# Patient Record
Sex: FEMALE | Race: WHITE | Employment: UNEMPLOYED | URBAN - METROPOLITAN AREA
[De-identification: names, ages, dates, MRNs, and addresses within clinical notes are randomized per-mention and may not be internally consistent; named-entity substitution may affect disease eponyms.]

---

## 2018-12-15 ENCOUNTER — APPOINTMENT (OUTPATIENT)
Dept: GENERAL RADIOLOGY | Age: 48
End: 2018-12-15
Payer: COMMERCIAL

## 2018-12-15 ENCOUNTER — HOSPITAL ENCOUNTER (OUTPATIENT)
Age: 48
Setting detail: OBSERVATION
Discharge: HOME | End: 2018-12-16
Attending: EMERGENCY MEDICINE | Admitting: EMERGENCY MEDICINE
Payer: COMMERCIAL

## 2018-12-15 ENCOUNTER — APPOINTMENT (OUTPATIENT)
Dept: CT IMAGING | Age: 48
End: 2018-12-15
Payer: COMMERCIAL

## 2018-12-15 DIAGNOSIS — R07.9 ACUTE CHEST PAIN: Primary | ICD-10-CM

## 2018-12-15 LAB
ABSOLUTE EOS #: <0.03 K/UL (ref 0–0.44)
ABSOLUTE IMMATURE GRANULOCYTE: 0.06 K/UL (ref 0–0.3)
ABSOLUTE LYMPH #: 2.46 K/UL (ref 1.1–3.7)
ABSOLUTE MONO #: 0.92 K/UL (ref 0.1–1.2)
ALBUMIN SERPL-MCNC: 4.4 G/DL (ref 3.5–5.2)
ALBUMIN/GLOBULIN RATIO: 1.3 (ref 1–2.5)
ALP BLD-CCNC: 136 U/L (ref 35–104)
ALT SERPL-CCNC: 81 U/L (ref 5–33)
ANION GAP SERPL CALCULATED.3IONS-SCNC: 21 MMOL/L (ref 9–17)
AST SERPL-CCNC: 165 U/L
BASOPHILS # BLD: 0 % (ref 0–2)
BASOPHILS ABSOLUTE: <0.03 K/UL (ref 0–0.2)
BILIRUB SERPL-MCNC: 1.14 MG/DL (ref 0.3–1.2)
BILIRUBIN DIRECT: 0.42 MG/DL
BILIRUBIN, INDIRECT: 0.72 MG/DL (ref 0–1)
BUN BLDV-MCNC: 10 MG/DL (ref 6–20)
BUN/CREAT BLD: ABNORMAL (ref 9–20)
CALCIUM IONIZED: 1.01 MMOL/L (ref 1.13–1.33)
CALCIUM SERPL-MCNC: 9.4 MG/DL (ref 8.6–10.4)
CHLORIDE BLD-SCNC: 95 MMOL/L (ref 98–107)
CO2: 23 MMOL/L (ref 20–31)
CREAT SERPL-MCNC: 0.51 MG/DL (ref 0.5–0.9)
D-DIMER QUANTITATIVE: 2.17 MG/L FEU
DIFFERENTIAL TYPE: ABNORMAL
EOSINOPHILS RELATIVE PERCENT: 0 % (ref 1–4)
GFR AFRICAN AMERICAN: >60 ML/MIN
GFR NON-AFRICAN AMERICAN: >60 ML/MIN
GFR SERPL CREATININE-BSD FRML MDRD: ABNORMAL ML/MIN/{1.73_M2}
GFR SERPL CREATININE-BSD FRML MDRD: ABNORMAL ML/MIN/{1.73_M2}
GLOBULIN: ABNORMAL G/DL (ref 1.5–3.8)
GLUCOSE BLD-MCNC: 128 MG/DL (ref 70–99)
HCT VFR BLD CALC: 40.4 % (ref 36.3–47.1)
HEMOGLOBIN: 13.9 G/DL (ref 11.9–15.1)
IMMATURE GRANULOCYTES: 1 %
LYMPHOCYTES # BLD: 21 % (ref 24–43)
MAGNESIUM: 1.4 MG/DL (ref 1.6–2.6)
MCH RBC QN AUTO: 36.3 PG (ref 25.2–33.5)
MCHC RBC AUTO-ENTMCNC: 34.4 G/DL (ref 28.4–34.8)
MCV RBC AUTO: 105.5 FL (ref 82.6–102.9)
MONOCYTES # BLD: 8 % (ref 3–12)
NRBC AUTOMATED: 0 PER 100 WBC
PDW BLD-RTO: 13 % (ref 11.8–14.4)
PLATELET # BLD: 177 K/UL (ref 138–453)
PLATELET ESTIMATE: ABNORMAL
PMV BLD AUTO: 11.5 FL (ref 8.1–13.5)
POC TROPONIN I: 0 NG/ML (ref 0–0.1)
POC TROPONIN I: 0 NG/ML (ref 0–0.1)
POC TROPONIN INTERP: NORMAL
POC TROPONIN INTERP: NORMAL
POTASSIUM SERPL-SCNC: 4.3 MMOL/L (ref 3.7–5.3)
RBC # BLD: 3.83 M/UL (ref 3.95–5.11)
RBC # BLD: ABNORMAL 10*6/UL
SEG NEUTROPHILS: 70 % (ref 36–65)
SEGMENTED NEUTROPHILS ABSOLUTE COUNT: 8.08 K/UL (ref 1.5–8.1)
SODIUM BLD-SCNC: 139 MMOL/L (ref 135–144)
THYROXINE, FREE: 1.23 NG/DL (ref 0.93–1.7)
TOTAL PROTEIN: 7.7 G/DL (ref 6.4–8.3)
TSH SERPL DL<=0.05 MIU/L-ACNC: 1.64 MIU/L (ref 0.3–5)
WBC # BLD: 11.6 K/UL (ref 3.5–11.3)
WBC # BLD: ABNORMAL 10*3/UL

## 2018-12-15 PROCEDURE — 71045 X-RAY EXAM CHEST 1 VIEW: CPT

## 2018-12-15 PROCEDURE — 84484 ASSAY OF TROPONIN QUANT: CPT

## 2018-12-15 PROCEDURE — 82330 ASSAY OF CALCIUM: CPT

## 2018-12-15 PROCEDURE — 2580000003 HC RX 258: Performed by: EMERGENCY MEDICINE

## 2018-12-15 PROCEDURE — 6370000000 HC RX 637 (ALT 250 FOR IP): Performed by: EMERGENCY MEDICINE

## 2018-12-15 PROCEDURE — 96375 TX/PRO/DX INJ NEW DRUG ADDON: CPT

## 2018-12-15 PROCEDURE — G0378 HOSPITAL OBSERVATION PER HR: HCPCS

## 2018-12-15 PROCEDURE — 71260 CT THORAX DX C+: CPT

## 2018-12-15 PROCEDURE — 80076 HEPATIC FUNCTION PANEL: CPT

## 2018-12-15 PROCEDURE — 85025 COMPLETE CBC W/AUTO DIFF WBC: CPT

## 2018-12-15 PROCEDURE — 6360000004 HC RX CONTRAST MEDICATION: Performed by: EMERGENCY MEDICINE

## 2018-12-15 PROCEDURE — 99285 EMERGENCY DEPT VISIT HI MDM: CPT

## 2018-12-15 PROCEDURE — 83735 ASSAY OF MAGNESIUM: CPT

## 2018-12-15 PROCEDURE — 6360000002 HC RX W HCPCS: Performed by: EMERGENCY MEDICINE

## 2018-12-15 PROCEDURE — 93005 ELECTROCARDIOGRAM TRACING: CPT

## 2018-12-15 PROCEDURE — 96365 THER/PROPH/DIAG IV INF INIT: CPT

## 2018-12-15 PROCEDURE — 85379 FIBRIN DEGRADATION QUANT: CPT

## 2018-12-15 PROCEDURE — 84443 ASSAY THYROID STIM HORMONE: CPT

## 2018-12-15 PROCEDURE — 84439 ASSAY OF FREE THYROXINE: CPT

## 2018-12-15 PROCEDURE — 80048 BASIC METABOLIC PNL TOTAL CA: CPT

## 2018-12-15 RX ORDER — SODIUM CHLORIDE 0.9 % (FLUSH) 0.9 %
10 SYRINGE (ML) INJECTION EVERY 12 HOURS SCHEDULED
Status: DISCONTINUED | OUTPATIENT
Start: 2018-12-15 | End: 2018-12-16 | Stop reason: HOSPADM

## 2018-12-15 RX ORDER — MAGNESIUM SULFATE 1 G/100ML
1 INJECTION INTRAVENOUS ONCE
Status: COMPLETED | OUTPATIENT
Start: 2018-12-15 | End: 2018-12-15

## 2018-12-15 RX ORDER — ASPIRIN 81 MG/1
324 TABLET, CHEWABLE ORAL ONCE
Status: COMPLETED | OUTPATIENT
Start: 2018-12-15 | End: 2018-12-15

## 2018-12-15 RX ORDER — LORAZEPAM 2 MG/ML
1 INJECTION INTRAMUSCULAR ONCE
Status: COMPLETED | OUTPATIENT
Start: 2018-12-15 | End: 2018-12-15

## 2018-12-15 RX ORDER — OXYCODONE HYDROCHLORIDE 5 MG/1
10 TABLET ORAL EVERY 4 HOURS PRN
Status: DISCONTINUED | OUTPATIENT
Start: 2018-12-15 | End: 2018-12-16 | Stop reason: HOSPADM

## 2018-12-15 RX ORDER — FENTANYL CITRATE 50 UG/ML
50 INJECTION, SOLUTION INTRAMUSCULAR; INTRAVENOUS
Status: DISCONTINUED | OUTPATIENT
Start: 2018-12-15 | End: 2018-12-16 | Stop reason: HOSPADM

## 2018-12-15 RX ORDER — FENTANYL CITRATE 50 UG/ML
25 INJECTION, SOLUTION INTRAMUSCULAR; INTRAVENOUS
Status: DISCONTINUED | OUTPATIENT
Start: 2018-12-15 | End: 2018-12-16 | Stop reason: HOSPADM

## 2018-12-15 RX ORDER — ACETAMINOPHEN 325 MG/1
650 TABLET ORAL EVERY 4 HOURS PRN
Status: DISCONTINUED | OUTPATIENT
Start: 2018-12-15 | End: 2018-12-16 | Stop reason: HOSPADM

## 2018-12-15 RX ORDER — SODIUM CHLORIDE 0.9 % (FLUSH) 0.9 %
10 SYRINGE (ML) INJECTION PRN
Status: DISCONTINUED | OUTPATIENT
Start: 2018-12-15 | End: 2018-12-16 | Stop reason: HOSPADM

## 2018-12-15 RX ORDER — OXYCODONE HYDROCHLORIDE 5 MG/1
5 TABLET ORAL EVERY 4 HOURS PRN
Status: DISCONTINUED | OUTPATIENT
Start: 2018-12-15 | End: 2018-12-16 | Stop reason: HOSPADM

## 2018-12-15 RX ORDER — OXYCODONE HYDROCHLORIDE AND ACETAMINOPHEN 5; 325 MG/1; MG/1
1 TABLET ORAL ONCE
Status: COMPLETED | OUTPATIENT
Start: 2018-12-15 | End: 2018-12-15

## 2018-12-15 RX ORDER — ONDANSETRON 4 MG/1
4 TABLET, ORALLY DISINTEGRATING ORAL EVERY 8 HOURS PRN
Status: DISCONTINUED | OUTPATIENT
Start: 2018-12-15 | End: 2018-12-16 | Stop reason: HOSPADM

## 2018-12-15 RX ADMIN — ASPIRIN 81 MG 324 MG: 81 TABLET ORAL at 16:08

## 2018-12-15 RX ADMIN — Medication 10 ML: at 20:48

## 2018-12-15 RX ADMIN — LORAZEPAM 1 MG: 2 INJECTION, SOLUTION INTRAMUSCULAR; INTRAVENOUS at 16:09

## 2018-12-15 RX ADMIN — MAGNESIUM SULFATE HEPTAHYDRATE 1 G: 1 INJECTION, SOLUTION INTRAVENOUS at 17:20

## 2018-12-15 RX ADMIN — IOPAMIDOL 75 ML: 755 INJECTION, SOLUTION INTRAVENOUS at 16:58

## 2018-12-15 RX ADMIN — OXYCODONE HYDROCHLORIDE AND ACETAMINOPHEN 1 TABLET: 5; 325 TABLET ORAL at 17:43

## 2018-12-15 RX ADMIN — OXYCODONE HYDROCHLORIDE 5 MG: 5 TABLET ORAL at 20:47

## 2018-12-15 ASSESSMENT — PAIN DESCRIPTION - DESCRIPTORS
DESCRIPTORS: SHARP
DESCRIPTORS: SHARP

## 2018-12-15 ASSESSMENT — PAIN DESCRIPTION - FREQUENCY
FREQUENCY: CONTINUOUS
FREQUENCY: CONTINUOUS

## 2018-12-15 ASSESSMENT — PAIN DESCRIPTION - ORIENTATION
ORIENTATION: LEFT
ORIENTATION: LEFT

## 2018-12-15 ASSESSMENT — PAIN DESCRIPTION - LOCATION
LOCATION: CHEST
LOCATION: CHEST

## 2018-12-15 ASSESSMENT — PAIN DESCRIPTION - PAIN TYPE
TYPE: ACUTE PAIN
TYPE: ACUTE PAIN

## 2018-12-15 ASSESSMENT — PAIN DESCRIPTION - PROGRESSION
CLINICAL_PROGRESSION: NOT CHANGED
CLINICAL_PROGRESSION: NOT CHANGED
CLINICAL_PROGRESSION: GRADUALLY WORSENING
CLINICAL_PROGRESSION: NOT CHANGED
CLINICAL_PROGRESSION: NOT CHANGED

## 2018-12-15 ASSESSMENT — HEART SCORE: ECG: 1

## 2018-12-15 ASSESSMENT — PAIN SCALES - GENERAL
PAINLEVEL_OUTOF10: 5
PAINLEVEL_OUTOF10: 4

## 2018-12-15 ASSESSMENT — ENCOUNTER SYMPTOMS
DIARRHEA: 0
COUGH: 1
CONSTIPATION: 0
COLOR CHANGE: 0
PHOTOPHOBIA: 0
TROUBLE SWALLOWING: 0
SHORTNESS OF BREATH: 1
ABDOMINAL PAIN: 0
NAUSEA: 0
CHEST TIGHTNESS: 1
VOMITING: 0

## 2018-12-15 NOTE — ED PROVIDER NOTES
[facility]  ED  Emergency Department Encounter  EmergencyMedicine Resident     Pt Letty Hadley  MRN: [id number]  [place] 1970  Date of evaluation: 12/15/18  PCP:  LOI Stephen CNP    CHIEF COMPLAINT       Chief Complaint   Patient presents with    Chest Pain       HISTORY OF PRESENT ILLNESS  (Location/Symptom, Timing/Onset, Context/Setting, Quality, Duration, Modifying Factors, Severity.)      Karma Boas is a 51 y.o. female who presents with acute progressive left-sided chest pain starting at 9:00 the morning prior to arrival and worsening throughout the day. Patient states symptoms are new, denies previous similar symptoms in the past.  Notes pain radiates into her left shoulder. Denies nausea, vomiting. Previous cardiac workup in 2012 with negative stress test at that time per patient. Denies history of MI, cardiac events. States she is followed by a PCP and notes history of hypertension, reports she is on no medication at this time. Denies recent surgery, periods of inactivity, unilateral leg swelling. Denies history of DVT. Not currently on oral contraceptive. States she is a current every day smoker. Drinks approximately 2 alcoholic beverages daily. Denies illicit substance use. PAST MEDICAL / SURGICAL / SOCIAL / FAMILY HISTORY      has a past medical history of Hypertension and Pain, joint, ankle and foot. has a past surgical history that includes Hysterectomy (2003); Brain tumor excision (1989); fracture surgery (Left, 7-3-13); and fracture surgery (Right). Social History     Social History    Marital status: Single     Spouse name: N/A    Number of children: N/A    Years of education: N/A     Occupational History    Not on file.      Social History Main Topics    Smoking status: Current Every Day Smoker     Packs/day: 1.00     Years: 30.00     Types: Cigarettes    Smokeless tobacco: Never Used    Alcohol use 0.0 oz/week Vital signs remarkable for borderline hypertension, tachycardia. Patient afebrile. Physical exam shows generalized tremor with mild tongue fasciculations. No focal neurological deficits. Heart is regular rate and rhythm. Extremities or in his story wheezing on lung exams with diminished breath sounds throughout. Mild generalized abdominal tenderness, worse in the right upper quadrant without rebound or guarding. We'll obtain EKG, chest x-ray, cardiac labs including d-dimer. LFTs obtained. 1 mg Ativan given along with full dose aspirin. We'll continue to monitor and treat. ED Course as of Dec 15 1929   Sat Dec 15, 2018   1630 Available lab results remarkable for d-dimer elevated at 2.17. We'll proceed with CT chest to rule out PE, aortic dissection. Mild leukocytosis. Initial troponin negative. Will continue to monitor and treat. [RB]   9796 CT chest negative for pulmonary embolism. Right lower lobe ground-glass pulmonary nodule, Radiology recommending follow up CT for further evaluation in future. Magnesium sulfate started due to low magnesium, borderline QTc prolongation at 442. [RB]   3169 Patient agreeable for admission. Heart score calculated at 4. Spoke with [facility] consultants regarding patient history, presentation to the ED, current workup and findings[facility]. Orders placed. Awaiting bed placement at this time. We'll continue to monitor and treat. [RB]      ED Course User Index  [RB] Kay Norwood MD     Patient transported to observation unit in no acute distress. She remained stable throughout entire ED visit while under my care. PROCEDURES:  None    CONSULTS:  IP CONSULT TO HOSPITALIST  IP CONSULT TO CARDIOLOGY    CRITICAL CARE:  None    FINAL IMPRESSION      1.  Acute chest pain          DISPOSITION / PLAN     DISPOSITION Admitted 12/15/2018 06:21:27 PM      PATIENT REFERRED TO:  LOI Kaufman -

## 2018-12-15 NOTE — ED TRIAGE NOTES
Arrives w/ CO LT sided CP that has been gradually worsening since 0900 this morning. Pain was dull until around 1445 when the pain became very sharp. Pt states pain slightly radiates to her back. Hands are tingling, mostly LT side. Denies n/v but has had recent diarrhea. Denies SOB. Pt is trembling in bed but states this is d/t pain and nerves. Vitals stable.   Pt states she had a stress test in 2012 which was normal.

## 2018-12-15 NOTE — ED PROVIDER NOTES
circumstances.)    Odessa Smith MD [facility]  Attending Emergency Medicine Physician            Raymundo Avalos MD  12/15/18 0027

## 2018-12-16 VITALS
OXYGEN SATURATION: 98 % | HEART RATE: 76 BPM | BODY MASS INDEX: 22.99 KG/M2 | DIASTOLIC BLOOD PRESSURE: 97 MMHG | RESPIRATION RATE: 18 BRPM | SYSTOLIC BLOOD PRESSURE: 142 MMHG | HEIGHT: 65 IN | WEIGHT: 138 LBS | TEMPERATURE: 97.3 F

## 2018-12-16 LAB
-: NORMAL
AMORPHOUS: NORMAL
BACTERIA: NORMAL
BILIRUBIN URINE: ABNORMAL
CASTS UA: NORMAL /LPF (ref 0–8)
COLOR: ABNORMAL
COMMENT UA: ABNORMAL
CRYSTALS, UA: NORMAL /HPF
EPITHELIAL CELLS UA: NORMAL /HPF (ref 0–5)
GLUCOSE URINE: NEGATIVE
KETONES, URINE: ABNORMAL
LEUKOCYTE ESTERASE, URINE: ABNORMAL
MUCUS: NORMAL
NITRITE, URINE: POSITIVE
OTHER OBSERVATIONS UA: NORMAL
PH UA: 8.5 (ref 5–8)
PROTEIN UA: ABNORMAL
RBC UA: NORMAL /HPF (ref 0–4)
RENAL EPITHELIAL, UA: NORMAL /HPF
SPECIFIC GRAVITY UA: 1.08 (ref 1–1.03)
TRICHOMONAS: NORMAL
TROPONIN INTERP: NORMAL
TROPONIN T: <0.03 NG/ML
TURBIDITY: CLEAR
URINE HGB: NEGATIVE
UROBILINOGEN, URINE: NORMAL
WBC UA: NORMAL /HPF (ref 0–5)
YEAST: NORMAL

## 2018-12-16 PROCEDURE — 93005 ELECTROCARDIOGRAM TRACING: CPT

## 2018-12-16 PROCEDURE — 6370000000 HC RX 637 (ALT 250 FOR IP): Performed by: EMERGENCY MEDICINE

## 2018-12-16 PROCEDURE — 84484 ASSAY OF TROPONIN QUANT: CPT

## 2018-12-16 PROCEDURE — 81001 URINALYSIS AUTO W/SCOPE: CPT

## 2018-12-16 PROCEDURE — 36415 COLL VENOUS BLD VENIPUNCTURE: CPT

## 2018-12-16 PROCEDURE — G0378 HOSPITAL OBSERVATION PER HR: HCPCS

## 2018-12-16 RX ORDER — AMINOPHYLLINE DIHYDRATE 25 MG/ML
100 INJECTION, SOLUTION INTRAVENOUS
Status: CANCELLED | OUTPATIENT
Start: 2018-12-16 | End: 2018-12-16

## 2018-12-16 RX ORDER — METOPROLOL TARTRATE 5 MG/5ML
2.5 INJECTION INTRAVENOUS PRN
Status: CANCELLED | OUTPATIENT
Start: 2018-12-16 | End: 2018-12-16

## 2018-12-16 RX ORDER — SODIUM CHLORIDE 0.9 % (FLUSH) 0.9 %
10 SYRINGE (ML) INJECTION PRN
Status: CANCELLED | OUTPATIENT
Start: 2018-12-16 | End: 2018-12-16

## 2018-12-16 RX ORDER — SODIUM CHLORIDE 9 MG/ML
INJECTION, SOLUTION INTRAVENOUS ONCE
Status: CANCELLED | OUTPATIENT
Start: 2018-12-16 | End: 2018-12-16

## 2018-12-16 RX ORDER — NITROGLYCERIN 0.4 MG/1
0.4 TABLET SUBLINGUAL EVERY 5 MIN PRN
Status: CANCELLED | OUTPATIENT
Start: 2018-12-16 | End: 2018-12-16

## 2018-12-16 RX ADMIN — OXYCODONE HYDROCHLORIDE 5 MG: 5 TABLET ORAL at 06:04

## 2018-12-16 RX ADMIN — OXYCODONE HYDROCHLORIDE 5 MG: 5 TABLET ORAL at 00:44

## 2018-12-16 ASSESSMENT — PAIN DESCRIPTION - PROGRESSION
CLINICAL_PROGRESSION: NOT CHANGED

## 2018-12-16 ASSESSMENT — PAIN SCALES - GENERAL
PAINLEVEL_OUTOF10: 4
PAINLEVEL_OUTOF10: 4

## 2018-12-16 NOTE — PROGRESS NOTES
CDU Daily Progress Note  Attending Physician       Pt Name: Liana Tafoya  MRN: [id number]  [place] 1970  Date of evaluation: 12/16/18    I performed a history and physical examination of the patient and discussed management with the resident. I reviewed the residents note and agree with the documented findings and plan of care. Any areas of disagreement are noted on the chart. I was personally present for the key portions of any procedures. I have documented in the chart those procedures where I was not present during the key portions. I have reviewed the emergency nurses triage note. I agree with the chief complaint, past medical history, past surgical history, allergies, medications, social and family history as documented unless otherwise noted below. Documentation of the HPI, Physical Exam and Medical Decision Making performed by medical students or scribes is based on my personal performance of the HPI, PE and MDM. For Physician Assistant/ Nurse Practitioner cases/documentation I have personally evaluated this patient and have completed at least one if not all key elements of the E/M (history, physical exam, and MDM). Additional findings are as noted. The Family History, Social History and Review of Systems are unchanged from the previous day. No significant events overnight. Woke up with heaviness in her central chest w radiation to L shoulder, then developed L sided sharp chest pain radiation L shoulder, assoc w diaphoresis, SOB started yesterday morning, worse with exertion. PMHx: HTN, alcohol abuse, smoker. Dimer 2.17, CTPE: no PE but has ground glass pulmonary nodule RLL, ECG 1st degree AVB, otherwise nonspecific, trops neg. Cards consulted. Patient smokes 20 cigarettes per day for 35 years. Smoking cessation counseling for 3 minutes. Discussed the effects of smoking in regards to heart disease, lung disease, stroke and cancer.  I explained how this negatively effects their condition,

## 2018-12-16 NOTE — H&P
and heart score as above.  -On exam, heart regular rate and rhythm, chest pain is reproducible with palpation.  -Cardiology consulted. Appreciate recommendations. · Continue home medications and pain control  · Monitor vitals, labs, and imaging  · DISPO: pending consults and clinical improvement    CONSULTS:    IP CONSULT TO HOSPITALIST  IP CONSULT TO CARDIOLOGY    PROCEDURES:  Not indicated     PATIENT REFERRED TO:    LOI Kevin - CNP  [address] [place] [address] [address], DO Rafaela Middleton    This dictation was generated by voice recognition computer software. Although all attempts are made to edit the dictation for accuracy, there may be errors in the transcription that are not intended.

## 2018-12-16 NOTE — DISCHARGE SUMMARY
>60 >60 mL/min    GFR Comment          GFR Staging NOT REPORTED    Urinalysis   Result Value Ref Range    Color, UA ORANGE (A) YEL    Turbidity UA CLEAR CLEAR    Glucose, Ur NEGATIVE NEG    Bilirubin Urine NEGATIVE  Verified by ictotest. (A) NEG    Ketones, Urine SMALL (A) NEG    Specific Gravity, UA 1.079 (H) 1.005 - 1.030    Urine Hgb NEGATIVE NEG    pH, UA 8.5 (H) 5.0 - 8.0    Protein, UA NEGATIVE  Verified by sulfosalicylic acid test. (A) NEG    Urobilinogen, Urine Normal NORM    Nitrite, Urine POSITIVE (A) NEG    Leukocyte Esterase, Urine TRACE (A) NEG    Urinalysis Comments NOT REPORTED    Magnesium   Result Value Ref Range    Magnesium 1.4 (L) 1.6 - 2.6 mg/dL   Calcium, Ionized   Result Value Ref Range    Calcium, Ion 1.01 (L) 1.13 - 1.33 mmol/L   CBC Auto Differential   Result Value Ref Range    WBC 11.6 (H) 3.5 - 11.3 k/uL    RBC 3.83 (L) 3.95 - 5.11 m/uL    Hemoglobin 13.9 11.9 - 15.1 g/dL    Hematocrit 40.4 36.3 - 47.1 %    .5 (H) 82.6 - 102.9 fL    MCH 36.3 (H) 25.2 - 33.5 pg    MCHC 34.4 28.4 - 34.8 g/dL    RDW 13.0 11.8 - 14.4 %    Platelets 823 255 - 383 k/uL    MPV 11.5 8.1 - 13.5 fL    NRBC Automated 0.0 0.0 per 100 WBC    Differential Type NOT REPORTED     Seg Neutrophils 70 (H) 36 - 65 %    Lymphocytes 21 (L) 24 - 43 %    Monocytes 8 3 - 12 %    Eosinophils % 0 (L) 1 - 4 %    Basophils 0 0 - 2 %    Immature Granulocytes 1 (H) 0 %    Segs Absolute 8.08 1.50 - 8.10 k/uL    Absolute Lymph # 2.46 1.10 - 3.70 k/uL    Absolute Mono # 0.92 0.10 - 1.20 k/uL    Absolute Eos # <0.03 0.00 - 0.44 k/uL    Basophils # <0.03 0.00 - 0.20 k/uL    Absolute Immature Granulocyte 0.06 0.00 - 0.30 k/uL    WBC Morphology NOT REPORTED     RBC Morphology MACROCYTOSIS PRESENT     Platelet Estimate NOT REPORTED    D-Dimer, Quantitative   Result Value Ref Range    D-Dimer, Quant 2.17 mg/L FEU   TSH without Reflex   Result Value Ref Range    TSH 1.64 0.30 - 5.00 mIU/L   T4, Free   Result Value Ref Portable    Result Date: 12/15/2018  EXAMINATION: SINGLE XRAY VIEW OF THE CHEST 12/15/2018 4:09 pm COMPARISON: Chest 08/11/2014 HISTORY: ORDERING SYSTEM PROVIDED HISTORY: Chest pain TECHNOLOGIST PROVIDED HISTORY: Acute chest pain FINDINGS: The cardiomediastinal and hilar silhouettes appear unremarkable. The lungs appear clear. No pleural effusion evident. No pneumothorax is seen. No acute osseous abnormality is identified. No radiographic evidence of acute cardiopulmonary disease. Ct Chest Pulmonary Embolism W Contrast    Result Date: 12/15/2018  EXAMINATION: CTA OF THE CHEST 12/15/2018 5:04 pm TECHNIQUE: CTA of the chest was performed after the administration of intravenous contrast.  Multiplanar reformatted images are provided for review. MIP images are provided for review. Dose modulation, iterative reconstruction, and/or weight based adjustment of the mA/kV was utilized to reduce the radiation dose to as low as reasonably achievable. COMPARISON: None. HISTORY: ORDERING SYSTEM PROVIDED HISTORY: Acute onset left upper chest pain with SOB, elevated D-dimer FINDINGS: Pulmonary Arteries: Pulmonary arteries are adequately opacified for evaluation. No evidence of intraluminal filling defect to suggest pulmonary embolism. Main pulmonary artery is normal in caliber, 2.6 cm diameter. Mediastinum: No evidence of mediastinal lymphadenopathy. The heart and pericardium demonstrate no acute abnormality. There is no acute abnormality of the thoracic aorta. Ascending thoracic aorta diameter is 3.3 cm, descending thoracic aorta 2.9 cm diameter. Calcific atherosclerotic disease aorta. Lungs/pleura: The lungs are without acute process. 8 x 14 mm ground-glass nodule is demonstrated anterior right lower lobe series 4, image 70 without appreciable soft tissue component. No other pulmonary nodule is evident. No focal consolidation or pulmonary edema. No evidence of pleural effusion or pneumothorax.   There are few home. I have discussed plan of care with patient and they are in understanding. They were instructed to read discharge paperwork. All of their questions and concerns were addressed. Time Spent: 1 day      --  Cam Freeze, DO  Cam Freeze  Emergency Medicine Resident Physician    This dictation was generated by voice recognition computer software. Although all attempts are made to edit the dictation for accuracy, there may be errors in the transcription that are not intended.

## 2018-12-17 LAB
EKG ATRIAL RATE: 78 BPM
EKG ATRIAL RATE: 80 BPM
EKG ATRIAL RATE: 87 BPM
EKG P AXIS: 68 DEGREES
EKG P AXIS: 70 DEGREES
EKG P AXIS: 77 DEGREES
EKG P-R INTERVAL: 200 MS
EKG P-R INTERVAL: 234 MS
EKG P-R INTERVAL: 238 MS
EKG Q-T INTERVAL: 368 MS
EKG Q-T INTERVAL: 398 MS
EKG Q-T INTERVAL: 416 MS
EKG QRS DURATION: 84 MS
EKG QRS DURATION: 86 MS
EKG QRS DURATION: 90 MS
EKG QTC CALCULATION (BAZETT): 442 MS
EKG QTC CALCULATION (BAZETT): 459 MS
EKG QTC CALCULATION (BAZETT): 474 MS
EKG R AXIS: 76 DEGREES
EKG R AXIS: 77 DEGREES
EKG R AXIS: 84 DEGREES
EKG T AXIS: 72 DEGREES
EKG T AXIS: 73 DEGREES
EKG T AXIS: 75 DEGREES
EKG VENTRICULAR RATE: 78 BPM
EKG VENTRICULAR RATE: 80 BPM
EKG VENTRICULAR RATE: 87 BPM

## 2018-12-18 ENCOUNTER — OFFICE VISIT (OUTPATIENT)
Dept: FAMILY MEDICINE CLINIC | Age: 48
End: 2018-12-18
Payer: COMMERCIAL

## 2018-12-18 ENCOUNTER — TELEPHONE (OUTPATIENT)
Dept: FAMILY MEDICINE CLINIC | Age: 48
End: 2018-12-18

## 2018-12-18 VITALS
BODY MASS INDEX: 22.17 KG/M2 | DIASTOLIC BLOOD PRESSURE: 72 MMHG | WEIGHT: 133.2 LBS | OXYGEN SATURATION: 95 % | HEART RATE: 109 BPM | TEMPERATURE: 98.2 F | SYSTOLIC BLOOD PRESSURE: 110 MMHG

## 2018-12-18 DIAGNOSIS — R74.8 ELEVATED LIVER ENZYMES: ICD-10-CM

## 2018-12-18 DIAGNOSIS — R82.998 LEUKOCYTES IN URINE: ICD-10-CM

## 2018-12-18 DIAGNOSIS — Z13.220 SCREENING, LIPID: ICD-10-CM

## 2018-12-18 DIAGNOSIS — R73.9 HYPERGLYCEMIA: ICD-10-CM

## 2018-12-18 DIAGNOSIS — I10 ESSENTIAL HYPERTENSION: ICD-10-CM

## 2018-12-18 DIAGNOSIS — R07.89 CHEST WALL PAIN: ICD-10-CM

## 2018-12-18 DIAGNOSIS — E83.42 HYPOMAGNESEMIA: ICD-10-CM

## 2018-12-18 DIAGNOSIS — Z09 HOSPITAL DISCHARGE FOLLOW-UP: Primary | ICD-10-CM

## 2018-12-18 LAB
BILIRUBIN, POC: ABNORMAL
BLOOD URINE, POC: ABNORMAL
CLARITY, POC: ABNORMAL
COLOR, POC: ABNORMAL
GLUCOSE URINE, POC: ABNORMAL
KETONES, POC: 15
LEUKOCYTE EST, POC: ABNORMAL
NITRITE, POC: ABNORMAL
PH, POC: 6.5
PROTEIN, POC: 30
SPECIFIC GRAVITY, POC: 1.02
UROBILINOGEN, POC: 2

## 2018-12-18 PROCEDURE — 1111F DSCHRG MED/CURRENT MED MERGE: CPT | Performed by: NURSE PRACTITIONER

## 2018-12-18 PROCEDURE — 81003 URINALYSIS AUTO W/O SCOPE: CPT | Performed by: NURSE PRACTITIONER

## 2018-12-18 PROCEDURE — 99214 OFFICE O/P EST MOD 30 MIN: CPT | Performed by: NURSE PRACTITIONER

## 2018-12-18 RX ORDER — AMLODIPINE BESYLATE 5 MG/1
5 TABLET ORAL DAILY
Qty: 90 TABLET | Refills: 1 | Status: SHIPPED | OUTPATIENT
Start: 2018-12-18 | End: 2019-11-19 | Stop reason: SDUPTHER

## 2018-12-18 RX ORDER — FLUTICASONE PROPIONATE 50 MCG
2 SPRAY, SUSPENSION (ML) NASAL DAILY
Qty: 1 BOTTLE | Refills: 11 | Status: SHIPPED | OUTPATIENT
Start: 2018-12-18 | End: 2019-07-12 | Stop reason: SDUPTHER

## 2018-12-18 ASSESSMENT — ENCOUNTER SYMPTOMS
SINUS PRESSURE: 0
DIARRHEA: 0
BLOOD IN STOOL: 0
SHORTNESS OF BREATH: 0
ABDOMINAL PAIN: 0
CHEST TIGHTNESS: 0
RHINORRHEA: 0
CONSTIPATION: 0
COUGH: 0
EYE DISCHARGE: 0

## 2018-12-18 ASSESSMENT — PATIENT HEALTH QUESTIONNAIRE - PHQ9
1. LITTLE INTEREST OR PLEASURE IN DOING THINGS: 0
2. FEELING DOWN, DEPRESSED OR HOPELESS: 0
SUM OF ALL RESPONSES TO PHQ QUESTIONS 1-9: 0
SUM OF ALL RESPONSES TO PHQ QUESTIONS 1-9: 0
SUM OF ALL RESPONSES TO PHQ9 QUESTIONS 1 & 2: 0

## 2018-12-18 NOTE — TELEPHONE ENCOUNTER
[facility] Transitions Initial Follow Up Call    Outreach made within 2 business days of discharge: Yes    Patient: Delano Best Patient : 1970   MRN: [id number]  Reason for Admission: There are no discharge diagnoses documented for the most recent discharge.   Discharge Date: 18       Spoke with: aretha    Discharge department/facility: [facility]        Scheduled appointment with PCP within 7-14 days    Follow Up  Future Appointments  Date Time Provider Lexi Castillo   2018 2:30 PM LOI Montgomery - SAPNA Wong

## 2018-12-28 ENCOUNTER — TELEPHONE (OUTPATIENT)
Dept: PULMONOLOGY | Age: 48
End: 2018-12-28

## 2019-06-05 ENCOUNTER — OFFICE VISIT (OUTPATIENT)
Dept: FAMILY MEDICINE CLINIC | Age: 49
End: 2019-06-05
Payer: COMMERCIAL

## 2019-06-05 VITALS
OXYGEN SATURATION: 95 % | BODY MASS INDEX: 21.99 KG/M2 | WEIGHT: 132 LBS | HEIGHT: 65 IN | SYSTOLIC BLOOD PRESSURE: 108 MMHG | HEART RATE: 118 BPM | DIASTOLIC BLOOD PRESSURE: 78 MMHG

## 2019-06-05 DIAGNOSIS — F41.9 ANXIETY: Primary | ICD-10-CM

## 2019-06-05 DIAGNOSIS — F32.A DEPRESSION, UNSPECIFIED DEPRESSION TYPE: ICD-10-CM

## 2019-06-05 DIAGNOSIS — F51.04 PSYCHOPHYSIOLOGICAL INSOMNIA: ICD-10-CM

## 2019-06-05 PROCEDURE — 99213 OFFICE O/P EST LOW 20 MIN: CPT | Performed by: NURSE PRACTITIONER

## 2019-06-05 RX ORDER — ALPRAZOLAM 0.25 MG/1
0.25 TABLET ORAL DAILY PRN
Qty: 10 TABLET | Refills: 0 | Status: SHIPPED | OUTPATIENT
Start: 2019-06-05 | End: 2019-07-05

## 2019-06-05 RX ORDER — ESCITALOPRAM OXALATE 10 MG/1
10 TABLET ORAL DAILY
Qty: 90 TABLET | Refills: 0 | Status: SHIPPED | OUTPATIENT
Start: 2019-06-05 | End: 2019-11-26

## 2019-06-05 RX ORDER — GABAPENTIN 100 MG/1
100 CAPSULE ORAL 3 TIMES DAILY
Qty: 270 CAPSULE | Refills: 0 | Status: ON HOLD | OUTPATIENT
Start: 2019-06-05 | End: 2019-07-16 | Stop reason: HOSPADM

## 2019-06-05 ASSESSMENT — ENCOUNTER SYMPTOMS
COUGH: 0
WHEEZING: 0
EYES NEGATIVE: 1
RESPIRATORY NEGATIVE: 1
SHORTNESS OF BREATH: 0
ALLERGIC/IMMUNOLOGIC NEGATIVE: 1

## 2019-06-05 NOTE — PROGRESS NOTES
Comment: 2-3x a week         Current Outpatient Medications   Medication Sig Dispense Refill    escitalopram (LEXAPRO) 10 MG tablet Take 1 tablet by mouth daily 90 tablet 0    gabapentin (NEURONTIN) 100 MG capsule Take 1 capsule by mouth 3 times daily for 180 days. Intended supply: 90 days 270 capsule 0    ALPRAZolam (XANAX) 0.25 MG tablet Take 1 tablet by mouth daily as needed for Anxiety for up to 30 days. 10 tablet 0    amLODIPine (NORVASC) 5 MG tablet Take 1 tablet by mouth daily 90 tablet 1    fluticasone (FLONASE) 50 MCG/ACT nasal spray 2 sprays by Nasal route daily 1 Bottle 11     No current facility-administered medications for this visit. No Known Allergies    Health Maintenance   Topic Date Due    Pneumococcal 0-64 years Vaccine (1 of 1 - PPSV23) 07/05/1975    Lipid screen  07/05/2009    DTaP/Tdap/Td vaccine (1 - Tdap) 12/18/2019 (Originally 7/5/1988)    Cervical cancer screen  12/18/2019 (Originally 7/5/1990)    HIV screen  12/18/2019 (Originally 7/5/1984)    Potassium monitoring  12/15/2019    Creatinine monitoring  12/15/2019    Flu vaccine  Completed          Review of Systems   Constitutional: Positive for fatigue. Negative for activity change, appetite change and fever. Eyes: Negative. Respiratory: Negative. Negative for cough, shortness of breath and wheezing. Cardiovascular: Negative. Negative for chest pain and palpitations. Musculoskeletal: Positive for arthralgias. Skin: Negative. Negative for pallor and rash. Allergic/Immunologic: Negative. Neurological: Positive for headaches. Hematological: Negative. Psychiatric/Behavioral: Positive for dysphoric mood and sleep disturbance. The patient is nervous/anxious.         Objective:     /78 (Site: Right Upper Arm, Position: Sitting, Cuff Size: Medium Adult)   Pulse 118   Ht 5' 5\" (1.651 m)   Wt 132 lb (59.9 kg)   SpO2 95%   BMI 21.97 kg/m²   Physical Exam   Constitutional: She is oriented to person, place, and time. She appears well-developed and well-nourished. HENT:   Head: Normocephalic. Cardiovascular: Normal rate, regular rhythm and normal heart sounds. Pulmonary/Chest: Effort normal and breath sounds normal.   Neurological: She is alert and oriented to person, place, and time. Skin: Skin is warm and dry. Psychiatric: Her mood appears anxious. Her affect is labile. Cognition and memory are impaired. She exhibits a depressed mood. Tearful           Assessment:      1. Anxiety    2. Depression, unspecified depression type    3. Psychophysiological insomnia                     Plan:      No orders of the defined types were placed in this encounter. 1. Anxiety    - escitalopram (LEXAPRO) 10 MG tablet; Take 1 tablet by mouth daily  Dispense: 90 tablet; Refill: 0  - gabapentin (NEURONTIN) 100 MG capsule; Take 1 capsule by mouth 3 times daily for 180 days. Intended supply: 90 days  Dispense: 270 capsule; Refill: 0    She may increase her HS dosing up to 300 mg to get desired benefit. - ALPRAZolam (XANAX) 0.25 MG tablet; Take 1 tablet by mouth daily as needed for Anxiety for up to 30 days. Dispense: 10 tablet; Refill: 0    2. Depression, unspecified depression type    - escitalopram (LEXAPRO) 10 MG tablet; Take 1 tablet by mouth daily  Dispense: 90 tablet; Refill: 0    3. Psychophysiological insomnia  Will use gabapentin and titrate up. Return in about 2 months (around 8/5/2019) for mood. Patient given educational materials - see patientinstructions. Discussed use, benefit, and side effects of prescribed medications. All patient questions answered. Pt voiced understanding. Reviewed health maintenance. Instructed to continue current medications, diet and exercise. Patient agreedwith treatment plan. Follow up as directed.      Electronically signed by LOI Torres CNP on 6/5/2019

## 2019-06-18 ENCOUNTER — HOSPITAL ENCOUNTER (OUTPATIENT)
Age: 49
Setting detail: SPECIMEN
Discharge: HOME | End: 2019-06-18
Payer: COMMERCIAL

## 2019-06-18 ENCOUNTER — OFFICE VISIT (OUTPATIENT)
Dept: FAMILY MEDICINE CLINIC | Age: 49
End: 2019-06-18
Payer: COMMERCIAL

## 2019-06-18 VITALS
BODY MASS INDEX: 21.9 KG/M2 | RESPIRATION RATE: 16 BRPM | HEART RATE: 107 BPM | TEMPERATURE: 98.1 F | DIASTOLIC BLOOD PRESSURE: 68 MMHG | WEIGHT: 131.6 LBS | SYSTOLIC BLOOD PRESSURE: 92 MMHG | OXYGEN SATURATION: 95 %

## 2019-06-18 DIAGNOSIS — I10 ESSENTIAL HYPERTENSION: ICD-10-CM

## 2019-06-18 DIAGNOSIS — Z13.29 SCREENING FOR THYROID DISORDER: ICD-10-CM

## 2019-06-18 DIAGNOSIS — Z13.0 SCREENING FOR DEFICIENCY ANEMIA: ICD-10-CM

## 2019-06-18 DIAGNOSIS — E55.9 VITAMIN D DEFICIENCY: ICD-10-CM

## 2019-06-18 DIAGNOSIS — F33.1 MODERATE EPISODE OF RECURRENT MAJOR DEPRESSIVE DISORDER (HCC): ICD-10-CM

## 2019-06-18 DIAGNOSIS — Z13.220 SCREENING FOR HYPERLIPIDEMIA: ICD-10-CM

## 2019-06-18 DIAGNOSIS — F10.20 ALCOHOLISM (HCC): Primary | ICD-10-CM

## 2019-06-18 DIAGNOSIS — F41.1 GAD (GENERALIZED ANXIETY DISORDER): ICD-10-CM

## 2019-06-18 DIAGNOSIS — F10.20 ALCOHOLISM (HCC): ICD-10-CM

## 2019-06-18 LAB
ABSOLUTE EOS #: 0.14 K/UL (ref 0–0.4)
ABSOLUTE IMMATURE GRANULOCYTE: 0.14 K/UL (ref 0–0.3)
ABSOLUTE LYMPH #: 1.73 K/UL (ref 1–4.8)
ABSOLUTE MONO #: 0.65 K/UL (ref 0.1–0.8)
ALBUMIN SERPL-MCNC: 3.3 G/DL (ref 3.5–5.2)
ALBUMIN/GLOBULIN RATIO: 0.9 (ref 1–2.5)
ALP BLD-CCNC: 264 U/L (ref 35–104)
ALT SERPL-CCNC: 134 U/L (ref 5–33)
ANION GAP SERPL CALCULATED.3IONS-SCNC: 15 MMOL/L (ref 9–17)
AST SERPL-CCNC: 534 U/L
BASOPHILS # BLD: 0 % (ref 0–2)
BASOPHILS ABSOLUTE: 0 K/UL (ref 0–0.2)
BILIRUB SERPL-MCNC: 1.18 MG/DL (ref 0.3–1.2)
BUN BLDV-MCNC: 6 MG/DL (ref 6–20)
BUN/CREAT BLD: ABNORMAL (ref 9–20)
CALCIUM SERPL-MCNC: 8.6 MG/DL (ref 8.6–10.4)
CHLORIDE BLD-SCNC: 105 MMOL/L (ref 98–107)
CHOLESTEROL, FASTING: 151 MG/DL
CHOLESTEROL/HDL RATIO: 3.8
CO2: 25 MMOL/L (ref 20–31)
CREAT SERPL-MCNC: 0.32 MG/DL (ref 0.5–0.9)
DIFFERENTIAL TYPE: ABNORMAL
EOSINOPHILS RELATIVE PERCENT: 2 % (ref 1–4)
FOLATE: 4.6 NG/ML
GFR AFRICAN AMERICAN: >60 ML/MIN
GFR NON-AFRICAN AMERICAN: >60 ML/MIN
GFR SERPL CREATININE-BSD FRML MDRD: ABNORMAL ML/MIN/{1.73_M2}
GFR SERPL CREATININE-BSD FRML MDRD: ABNORMAL ML/MIN/{1.73_M2}
GLUCOSE BLD-MCNC: 105 MG/DL (ref 70–99)
HCT VFR BLD CALC: 37 % (ref 36.3–47.1)
HDLC SERPL-MCNC: 40 MG/DL
HEMOGLOBIN: 12 G/DL (ref 11.9–15.1)
IMMATURE GRANULOCYTES: 2 %
LDL CHOLESTEROL: 92 MG/DL (ref 0–130)
LYMPHOCYTES # BLD: 24 % (ref 24–44)
MCH RBC QN AUTO: 37 PG (ref 25.2–33.5)
MCHC RBC AUTO-ENTMCNC: 32.4 G/DL (ref 28.4–34.8)
MCV RBC AUTO: 114.2 FL (ref 82.6–102.9)
MONOCYTES # BLD: 9 % (ref 1–7)
MORPHOLOGY: ABNORMAL
NRBC AUTOMATED: 0 PER 100 WBC
PDW BLD-RTO: 16.7 % (ref 11.8–14.4)
PLATELET # BLD: 201 K/UL (ref 138–453)
PLATELET ESTIMATE: ABNORMAL
PMV BLD AUTO: 12.2 FL (ref 8.1–13.5)
POTASSIUM SERPL-SCNC: 3.9 MMOL/L (ref 3.7–5.3)
RBC # BLD: 3.24 M/UL (ref 3.95–5.11)
RBC # BLD: ABNORMAL 10*6/UL
SEG NEUTROPHILS: 63 % (ref 36–66)
SEGMENTED NEUTROPHILS ABSOLUTE COUNT: 4.54 K/UL (ref 1.8–7.7)
SODIUM BLD-SCNC: 145 MMOL/L (ref 135–144)
TOTAL PROTEIN: 7 G/DL (ref 6.4–8.3)
TRIGLYCERIDE, FASTING: 97 MG/DL
TSH SERPL DL<=0.05 MIU/L-ACNC: 1.03 MIU/L (ref 0.3–5)
VITAMIN B-12: 1329 PG/ML (ref 232–1245)
VITAMIN D 25-HYDROXY: 6.3 NG/ML (ref 30–100)
VLDLC SERPL CALC-MCNC: ABNORMAL MG/DL (ref 1–30)
WBC # BLD: 7.2 K/UL (ref 3.5–11.3)
WBC # BLD: ABNORMAL 10*3/UL

## 2019-06-18 PROCEDURE — 99214 OFFICE O/P EST MOD 30 MIN: CPT | Performed by: INTERNAL MEDICINE

## 2019-06-18 RX ORDER — BUSPIRONE HYDROCHLORIDE 10 MG/1
10 TABLET ORAL 3 TIMES DAILY
Qty: 90 TABLET | Refills: 1 | Status: SHIPPED | OUTPATIENT
Start: 2019-06-18 | End: 2019-09-10 | Stop reason: SDUPTHER

## 2019-06-18 RX ORDER — AMLODIPINE BESYLATE 5 MG/1
5 TABLET ORAL DAILY
Qty: 90 TABLET | Refills: 1 | Status: CANCELLED | OUTPATIENT
Start: 2019-06-18

## 2019-06-18 RX ORDER — DISULFIRAM 500 MG/1
500 TABLET ORAL DAILY
Qty: 30 TABLET | Refills: 3 | Status: ON HOLD | OUTPATIENT
Start: 2019-06-18 | End: 2019-07-16 | Stop reason: HOSPADM

## 2019-06-18 NOTE — PROGRESS NOTES
Subjective:       Patient ID: Zeus English is a 51 y.o. female who presents for   Chief Complaint   Patient presents with    Alcohol Problem       HPI:  Nursing note reviewed and discussed with patient. Lost her job one week ago Wednesday due to her alcoholism. She has not had a drink since Thursday 6/13 - today is day 6 of her sobriety. Starting to have dreams now   Going to bed around midnight and waking up around 7am , sometimes later. She has been intermittently shaky. Her mother and sister are here with her today   They have been talking to a friend in Crisis and Recovery who has given them suggestions of remeron, antabuse and vivitrol to try. Marianna Wright has been using alcohol to deal with her anxiety. She has never been on buspar and never been to counseling for her anxiety. She states losing a job she loved was a big shock to her and has made her aware of the seriousness of her alcohol habit and she is willing to contract to check in with her mother daily and commit to her own recovery. Patient's medications, allergies, past medical, surgical, social and family histories were reviewed and updated as appropriate. Social History     Tobacco Use    Smoking status: Current Every Day Smoker     Packs/day: 1.00     Years: 30.00     Pack years: 30.00     Types: Cigarettes    Smokeless tobacco: Never Used   Substance Use Topics    Alcohol use: Yes     Alcohol/week: 0.0 oz     Comment: 2-3x a week        Review of Systems  Energy level good overall, and weight is stable. No chest pain or shortness of breath. Bowels have been normal without constipation or diarrhea         Objective:        Physical Exam:  BP 92/68 (Site: Left Upper Arm, Position: Sitting, Cuff Size: Medium Adult)   Pulse 107   Temp 98.1 °F (36.7 °C) (Oral)   Resp 16   Wt 131 lb 9.6 oz (59.7 kg)   SpO2 95%   BMI 21.90 kg/m²     General: Alert and oriented, in no distress. Patient ambulating with normal gait.  Normal body habitus. Chest: clear with no wheezes or rales. No retractions, or use of accessory muscles noted. Cardiovascular: PMI is not displaced, and no thrill noted. Regular rate and rhythm with no rub, murmur or gallop. There is no peripheral edema. Pedal pulses are normal.   Abdomen: Abdomen is soft and nontender. The bowel sounds are normal.   Musculoskeletal: There are no deformities of the the extremities. Patient has all ten fingers intact. The patient has full range of motion on all 4 extremities without pain. Skin: The skin is warm and dry. There are no rashes noted. Prior to Visit Medications    Medication Sig Taking? Authorizing Provider   escitalopram (LEXAPRO) 10 MG tablet Take 1 tablet by mouth daily Yes LOI Deleon CNP   gabapentin (NEURONTIN) 100 MG capsule Take 1 capsule by mouth 3 times daily for 180 days. Intended supply: 90 days Yes LOI Deleon CNP   ALPRAZolam (XANAX) 0.25 MG tablet Take 1 tablet by mouth daily as needed for Anxiety for up to 30 days. Yes LOI Deleon CNP   amLODIPine (NORVASC) 5 MG tablet Take 1 tablet by mouth daily Yes LOI Julian CNP   fluticasone (FLONASE) 50 MCG/ACT nasal spray 2 sprays by Nasal route daily Yes LOI Julian CNP       Data Review      Assessment/Plan:      1. Alcoholism ([facility]  Given information for [facility] - wants to get on vivitrol   No DT symptoms   - External Referral To Psychology  - Vitamin B1; Future  - Vitamin B12 & Folate; Future  - Disulfiram (ANTABUSE) 500 MG TABS; Take 500 mg by mouth daily  Dispense: 30 tablet; Refill: 3    2. Moderate episode of recurrent major depressive disorder ([facility]  Continue lexapro   - External Referral To Psychology    3. Essential hypertension  Continue current regimen     4. AMAN (generalized anxiety disorder)  - External Referral To Psychology  - busPIRone (BUSPAR) 10 MG tablet;  Take 1 tablet by mouth 3 times daily  Dispense: 90 tablet; Refill: 1    5. Vitamin D deficiency  - Vitamin D 25 Hydroxy; Future    6. Screening for thyroid disorder  - TSH With Reflex Ft4; Future    7. Screening for hyperlipidemia  - Lipid, Fasting; Future  - Comprehensive Metabolic Panel; Future    8.  Screening for deficiency anemia  - CBC With Auto Differential; Future           Electronically signed by Fran Rodriguez MD on 6/18/2019 at 2:40 PM

## 2019-06-19 DIAGNOSIS — E53.8 FOLATE DEFICIENCY: ICD-10-CM

## 2019-06-19 DIAGNOSIS — R74.8 ELEVATED LIVER ENZYMES: ICD-10-CM

## 2019-06-19 DIAGNOSIS — E55.9 VITAMIN D DEFICIENCY: Primary | ICD-10-CM

## 2019-06-19 RX ORDER — FOLIC ACID 1 MG/1
1 TABLET ORAL DAILY
Qty: 90 TABLET | Refills: 1 | Status: SHIPPED | OUTPATIENT
Start: 2019-06-19

## 2019-06-19 RX ORDER — ERGOCALCIFEROL 1.25 MG/1
50000 CAPSULE ORAL WEEKLY
Qty: 12 CAPSULE | Refills: 1 | Status: SHIPPED | OUTPATIENT
Start: 2019-06-19

## 2019-06-24 LAB — VITAMIN B1 WHOLE BLOOD: 64 NMOL/L (ref 70–180)

## 2019-07-11 ENCOUNTER — OFFICE VISIT (OUTPATIENT)
Dept: FAMILY MEDICINE CLINIC | Age: 49
End: 2019-07-11
Payer: COMMERCIAL

## 2019-07-11 VITALS
HEIGHT: 65 IN | OXYGEN SATURATION: 97 % | HEART RATE: 113 BPM | BODY MASS INDEX: 21.16 KG/M2 | SYSTOLIC BLOOD PRESSURE: 112 MMHG | DIASTOLIC BLOOD PRESSURE: 70 MMHG | WEIGHT: 127 LBS

## 2019-07-11 DIAGNOSIS — F10.239 ALCOHOL DEPENDENCE WITH WITHDRAWAL WITH COMPLICATION (HCC): Primary | ICD-10-CM

## 2019-07-11 PROCEDURE — 99214 OFFICE O/P EST MOD 30 MIN: CPT | Performed by: INTERNAL MEDICINE

## 2019-07-11 RX ORDER — CHLORDIAZEPOXIDE HYDROCHLORIDE 25 MG/1
25 CAPSULE, GELATIN COATED ORAL
Qty: 10 CAPSULE | Refills: 3 | Status: ON HOLD | OUTPATIENT
Start: 2019-07-11 | End: 2019-07-16 | Stop reason: HOSPADM

## 2019-07-12 ENCOUNTER — OFFICE VISIT (OUTPATIENT)
Dept: FAMILY MEDICINE CLINIC | Age: 49
End: 2019-07-12
Payer: COMMERCIAL

## 2019-07-12 VITALS — OXYGEN SATURATION: 96 % | SYSTOLIC BLOOD PRESSURE: 112 MMHG | HEART RATE: 114 BPM | DIASTOLIC BLOOD PRESSURE: 66 MMHG

## 2019-07-12 DIAGNOSIS — E51.9 THIAMIN DEFICIENCY: ICD-10-CM

## 2019-07-12 DIAGNOSIS — F10.932 ALCOHOL WITHDRAWAL SYNDROME WITH PERCEPTUAL DISTURBANCE (HCC): Primary | ICD-10-CM

## 2019-07-12 DIAGNOSIS — H10.13 ALLERGIC CONJUNCTIVITIS OF BOTH EYES: ICD-10-CM

## 2019-07-12 DIAGNOSIS — J30.1 SEASONAL ALLERGIC RHINITIS DUE TO POLLEN: ICD-10-CM

## 2019-07-12 PROCEDURE — 99214 OFFICE O/P EST MOD 30 MIN: CPT | Performed by: INTERNAL MEDICINE

## 2019-07-12 RX ORDER — FLUTICASONE PROPIONATE 50 MCG
1 SPRAY, SUSPENSION (ML) NASAL DAILY
Qty: 1 BOTTLE | Refills: 6 | Status: SHIPPED | OUTPATIENT
Start: 2019-07-12 | End: 2020-06-11 | Stop reason: ALTCHOICE

## 2019-07-12 RX ORDER — OLOPATADINE HYDROCHLORIDE 1 MG/ML
1 SOLUTION/ DROPS OPHTHALMIC 2 TIMES DAILY
Qty: 1 BOTTLE | Refills: 3 | Status: SHIPPED | OUTPATIENT
Start: 2019-07-12 | End: 2019-08-11

## 2019-07-12 RX ORDER — THIAMINE MONONITRATE (VIT B1) 100 MG
100 TABLET ORAL DAILY
Qty: 30 TABLET | Refills: 3 | Status: SHIPPED | OUTPATIENT
Start: 2019-07-12 | End: 2021-10-25

## 2019-07-12 NOTE — PROGRESS NOTES
gallop. There is no peripheral edema. Pedal pulses are normal.   Abdomen: Abdomen is soft and nontender. The bowel sounds are normal.   Musculoskeletal: There are no deformities of the the extremities. Patient has all ten fingers intact. The patient has full range of motion on all 4 extremities without pain. Skin: The skin is warm and dry. There are no rashes noted. Prior to Visit Medications    Medication Sig Taking? Authorizing Provider   chlordiazePOXIDE (LIBRIUM) 25 MG capsule Take 1 capsule by mouth every 6-8 hours as needed for Anxiety for up to 30 days. Yes Flora Gandara MD   vitamin D (ERGOCALCIFEROL) 19248 units CAPS capsule Take 1 capsule by mouth once a week Yes Ludivina Tran MD   folic acid (FOLVITE) 1 MG tablet Take 1 tablet by mouth daily Yes Flora Gandara MD   busPIRone (BUSPAR) 10 MG tablet Take 1 tablet by mouth 3 times daily Yes Flora Gandara MD   Disulfiram (ANTABUSE) 500 MG TABS Take 500 mg by mouth daily Yes Flora Gandara MD   escitalopram (LEXAPRO) 10 MG tablet Take 1 tablet by mouth daily Yes LOI Deleon CNP   gabapentin (NEURONTIN) 100 MG capsule Take 1 capsule by mouth 3 times daily for 180 days. Intended supply: 90 days Yes LOI Deleon CNP   amLODIPine (NORVASC) 5 MG tablet Take 1 tablet by mouth daily Yes LOI Stephens CNP   fluticasone (FLONASE) 50 MCG/ACT nasal spray 2 sprays by Nasal route daily Yes LOI Stephens CNP       Data Review      Assessment/Plan:      1. 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 Thiamin deficiency  - vitamin B-1 (THIAMINE) 100 MG tablet; Take 1 tablet by mouth daily  Dispense: 30 tablet; Refill: 3    3.

## 2019-07-13 ENCOUNTER — HOSPITAL ENCOUNTER (INPATIENT)
Age: 49
LOS: 3 days | Discharge: OTHER INSTITUTION | DRG: 897 | End: 2019-07-16
Attending: EMERGENCY MEDICINE | Admitting: FAMILY MEDICINE
Payer: COMMERCIAL

## 2019-07-13 DIAGNOSIS — F10.930 ALCOHOL WITHDRAWAL SYNDROME WITHOUT COMPLICATION (HCC): Primary | ICD-10-CM

## 2019-07-13 PROBLEM — F10.932 ALCOHOL WITHDRAWAL HALLUCINOSIS (HCC): Status: ACTIVE | Noted: 2019-07-13

## 2019-07-13 LAB
ABSOLUTE EOS #: 0.11 K/UL (ref 0–0.44)
ABSOLUTE IMMATURE GRANULOCYTE: 0.11 K/UL (ref 0–0.3)
ABSOLUTE LYMPH #: 3.05 K/UL (ref 1.1–3.7)
ABSOLUTE MONO #: 0.98 K/UL (ref 0.1–1.2)
ALBUMIN SERPL-MCNC: 3.2 G/DL (ref 3.5–5.2)
ALBUMIN/GLOBULIN RATIO: 0.8 (ref 1–2.5)
ALP BLD-CCNC: 285 U/L (ref 35–104)
ALT SERPL-CCNC: 91 U/L (ref 5–33)
AMMONIA: 31 UMOL/L (ref 11–51)
ANION GAP SERPL CALCULATED.3IONS-SCNC: 13 MMOL/L (ref 9–17)
AST SERPL-CCNC: 234 U/L
BASOPHILS # BLD: 0 % (ref 0–2)
BASOPHILS ABSOLUTE: 0 K/UL (ref 0–0.2)
BILIRUB SERPL-MCNC: 1.89 MG/DL (ref 0.3–1.2)
BILIRUBIN DIRECT: 1.29 MG/DL
BILIRUBIN, INDIRECT: 0.6 MG/DL (ref 0–1)
BUN BLDV-MCNC: 15 MG/DL (ref 6–20)
BUN/CREAT BLD: ABNORMAL (ref 9–20)
CALCIUM SERPL-MCNC: 9.9 MG/DL (ref 8.6–10.4)
CHLORIDE BLD-SCNC: 96 MMOL/L (ref 98–107)
CO2: 27 MMOL/L (ref 20–31)
CREAT SERPL-MCNC: 0.43 MG/DL (ref 0.5–0.9)
DIFFERENTIAL TYPE: ABNORMAL
EOSINOPHILS RELATIVE PERCENT: 1 % (ref 1–4)
ETHANOL PERCENT: <0.01 %
ETHANOL: <10 MG/DL
GFR AFRICAN AMERICAN: >60 ML/MIN
GFR NON-AFRICAN AMERICAN: >60 ML/MIN
GFR SERPL CREATININE-BSD FRML MDRD: ABNORMAL ML/MIN/{1.73_M2}
GFR SERPL CREATININE-BSD FRML MDRD: ABNORMAL ML/MIN/{1.73_M2}
GLOBULIN: ABNORMAL G/DL (ref 1.5–3.8)
GLUCOSE BLD-MCNC: 98 MG/DL (ref 70–99)
HCT VFR BLD CALC: 37.3 % (ref 36.3–47.1)
HEMOGLOBIN: 11.8 G/DL (ref 11.9–15.1)
IMMATURE GRANULOCYTES: 1 %
INR BLD: 1.1
LYMPHOCYTES # BLD: 28 % (ref 24–43)
MCH RBC QN AUTO: 37.1 PG (ref 25.2–33.5)
MCHC RBC AUTO-ENTMCNC: 31.6 G/DL (ref 28.4–34.8)
MCV RBC AUTO: 117.3 FL (ref 82.6–102.9)
MONOCYTES # BLD: 9 % (ref 3–12)
MORPHOLOGY: ABNORMAL
MORPHOLOGY: ABNORMAL
NRBC AUTOMATED: 0 PER 100 WBC
PDW BLD-RTO: 15 % (ref 11.8–14.4)
PLATELET # BLD: 217 K/UL (ref 138–453)
PLATELET ESTIMATE: ABNORMAL
PMV BLD AUTO: 11.6 FL (ref 8.1–13.5)
POTASSIUM SERPL-SCNC: 4.2 MMOL/L (ref 3.7–5.3)
PROTHROMBIN TIME: 11.1 SEC (ref 9–12)
RBC # BLD: 3.18 M/UL (ref 3.95–5.11)
RBC # BLD: ABNORMAL 10*6/UL
SEG NEUTROPHILS: 61 % (ref 36–65)
SEGMENTED NEUTROPHILS ABSOLUTE COUNT: 6.65 K/UL (ref 1.5–8.1)
SODIUM BLD-SCNC: 136 MMOL/L (ref 135–144)
TOTAL PROTEIN: 7.3 G/DL (ref 6.4–8.3)
WBC # BLD: 10.9 K/UL (ref 3.5–11.3)
WBC # BLD: ABNORMAL 10*3/UL

## 2019-07-13 PROCEDURE — 85025 COMPLETE CBC W/AUTO DIFF WBC: CPT

## 2019-07-13 PROCEDURE — G0480 DRUG TEST DEF 1-7 CLASSES: HCPCS

## 2019-07-13 PROCEDURE — 99285 EMERGENCY DEPT VISIT HI MDM: CPT

## 2019-07-13 PROCEDURE — 96365 THER/PROPH/DIAG IV INF INIT: CPT

## 2019-07-13 PROCEDURE — 6360000002 HC RX W HCPCS: Performed by: STUDENT IN AN ORGANIZED HEALTH CARE EDUCATION/TRAINING PROGRAM

## 2019-07-13 PROCEDURE — 80048 BASIC METABOLIC PNL TOTAL CA: CPT

## 2019-07-13 PROCEDURE — 96366 THER/PROPH/DIAG IV INF ADDON: CPT

## 2019-07-13 PROCEDURE — 80076 HEPATIC FUNCTION PANEL: CPT

## 2019-07-13 PROCEDURE — 2580000003 HC RX 258: Performed by: FAMILY MEDICINE

## 2019-07-13 PROCEDURE — 2580000003 HC RX 258: Performed by: STUDENT IN AN ORGANIZED HEALTH CARE EDUCATION/TRAINING PROGRAM

## 2019-07-13 PROCEDURE — 82140 ASSAY OF AMMONIA: CPT

## 2019-07-13 PROCEDURE — 1200000000 HC SEMI PRIVATE

## 2019-07-13 PROCEDURE — 85610 PROTHROMBIN TIME: CPT

## 2019-07-13 PROCEDURE — 6370000000 HC RX 637 (ALT 250 FOR IP): Performed by: FAMILY MEDICINE

## 2019-07-13 PROCEDURE — 96375 TX/PRO/DX INJ NEW DRUG ADDON: CPT

## 2019-07-13 PROCEDURE — 2500000003 HC RX 250 WO HCPCS: Performed by: STUDENT IN AN ORGANIZED HEALTH CARE EDUCATION/TRAINING PROGRAM

## 2019-07-13 PROCEDURE — 6360000002 HC RX W HCPCS: Performed by: FAMILY MEDICINE

## 2019-07-13 RX ORDER — LORAZEPAM 1 MG/1
1 TABLET ORAL
Status: DISCONTINUED | OUTPATIENT
Start: 2019-07-13 | End: 2019-07-16 | Stop reason: HOSPADM

## 2019-07-13 RX ORDER — SODIUM CHLORIDE 0.9 % (FLUSH) 0.9 %
10 SYRINGE (ML) INJECTION EVERY 12 HOURS SCHEDULED
Status: DISCONTINUED | OUTPATIENT
Start: 2019-07-13 | End: 2019-07-16 | Stop reason: HOSPADM

## 2019-07-13 RX ORDER — POTASSIUM CHLORIDE 7.45 MG/ML
10 INJECTION INTRAVENOUS PRN
Status: DISCONTINUED | OUTPATIENT
Start: 2019-07-13 | End: 2019-07-16 | Stop reason: HOSPADM

## 2019-07-13 RX ORDER — 0.9 % SODIUM CHLORIDE 0.9 %
1000 INTRAVENOUS SOLUTION INTRAVENOUS ONCE
Status: COMPLETED | OUTPATIENT
Start: 2019-07-13 | End: 2019-07-13

## 2019-07-13 RX ORDER — CHLORDIAZEPOXIDE HYDROCHLORIDE 25 MG/1
25 CAPSULE, GELATIN COATED ORAL 4 TIMES DAILY
Status: DISCONTINUED | OUTPATIENT
Start: 2019-07-13 | End: 2019-07-14

## 2019-07-13 RX ORDER — LORAZEPAM 2 MG/ML
4 INJECTION INTRAMUSCULAR
Status: DISCONTINUED | OUTPATIENT
Start: 2019-07-13 | End: 2019-07-16 | Stop reason: HOSPADM

## 2019-07-13 RX ORDER — ACETAMINOPHEN 325 MG/1
650 TABLET ORAL EVERY 4 HOURS PRN
Status: DISCONTINUED | OUTPATIENT
Start: 2019-07-13 | End: 2019-07-16 | Stop reason: HOSPADM

## 2019-07-13 RX ORDER — SODIUM CHLORIDE 0.9 % (FLUSH) 0.9 %
10 SYRINGE (ML) INJECTION PRN
Status: DISCONTINUED | OUTPATIENT
Start: 2019-07-13 | End: 2019-07-16 | Stop reason: HOSPADM

## 2019-07-13 RX ORDER — LORAZEPAM 2 MG/1
2 TABLET ORAL
Status: DISCONTINUED | OUTPATIENT
Start: 2019-07-13 | End: 2019-07-16 | Stop reason: HOSPADM

## 2019-07-13 RX ORDER — BUSPIRONE HYDROCHLORIDE 10 MG/1
10 TABLET ORAL 3 TIMES DAILY
Status: DISCONTINUED | OUTPATIENT
Start: 2019-07-13 | End: 2019-07-14

## 2019-07-13 RX ORDER — ESCITALOPRAM OXALATE 10 MG/1
20 TABLET ORAL DAILY
Status: DISCONTINUED | OUTPATIENT
Start: 2019-07-13 | End: 2019-07-14

## 2019-07-13 RX ORDER — THIAMINE MONONITRATE (VIT B1) 100 MG
100 TABLET ORAL DAILY
Status: DISCONTINUED | OUTPATIENT
Start: 2019-07-13 | End: 2019-07-14

## 2019-07-13 RX ORDER — FLUTICASONE PROPIONATE 50 MCG
1 SPRAY, SUSPENSION (ML) NASAL DAILY
Status: DISCONTINUED | OUTPATIENT
Start: 2019-07-13 | End: 2019-07-16 | Stop reason: HOSPADM

## 2019-07-13 RX ORDER — AMLODIPINE BESYLATE 5 MG/1
5 TABLET ORAL DAILY
Status: DISCONTINUED | OUTPATIENT
Start: 2019-07-13 | End: 2019-07-16 | Stop reason: HOSPADM

## 2019-07-13 RX ORDER — POTASSIUM CHLORIDE 20 MEQ/1
40 TABLET, EXTENDED RELEASE ORAL PRN
Status: DISCONTINUED | OUTPATIENT
Start: 2019-07-13 | End: 2019-07-16 | Stop reason: HOSPADM

## 2019-07-13 RX ORDER — FOLIC ACID 1 MG/1
1 TABLET ORAL DAILY
Status: DISCONTINUED | OUTPATIENT
Start: 2019-07-13 | End: 2019-07-14

## 2019-07-13 RX ORDER — LORAZEPAM 2 MG/ML
3 INJECTION INTRAMUSCULAR
Status: DISCONTINUED | OUTPATIENT
Start: 2019-07-13 | End: 2019-07-16 | Stop reason: HOSPADM

## 2019-07-13 RX ORDER — OLOPATADINE HYDROCHLORIDE 1 MG/ML
1 SOLUTION/ DROPS OPHTHALMIC 2 TIMES DAILY
Status: DISCONTINUED | OUTPATIENT
Start: 2019-07-13 | End: 2019-07-16 | Stop reason: HOSPADM

## 2019-07-13 RX ORDER — LORAZEPAM 2 MG/ML
1 INJECTION INTRAMUSCULAR
Status: DISCONTINUED | OUTPATIENT
Start: 2019-07-13 | End: 2019-07-16 | Stop reason: HOSPADM

## 2019-07-13 RX ORDER — LORAZEPAM 2 MG/ML
2 INJECTION INTRAMUSCULAR
Status: DISCONTINUED | OUTPATIENT
Start: 2019-07-13 | End: 2019-07-16 | Stop reason: HOSPADM

## 2019-07-13 RX ORDER — LORAZEPAM 2 MG/1
4 TABLET ORAL
Status: DISCONTINUED | OUTPATIENT
Start: 2019-07-13 | End: 2019-07-16 | Stop reason: HOSPADM

## 2019-07-13 RX ORDER — SODIUM CHLORIDE 9 MG/ML
INJECTION, SOLUTION INTRAVENOUS CONTINUOUS
Status: DISCONTINUED | OUTPATIENT
Start: 2019-07-13 | End: 2019-07-16

## 2019-07-13 RX ORDER — LORAZEPAM 2 MG/ML
1 INJECTION INTRAMUSCULAR ONCE
Status: COMPLETED | OUTPATIENT
Start: 2019-07-13 | End: 2019-07-13

## 2019-07-13 RX ORDER — ERGOCALCIFEROL 1.25 MG/1
50000 CAPSULE ORAL WEEKLY
Status: DISCONTINUED | OUTPATIENT
Start: 2019-07-18 | End: 2019-07-16 | Stop reason: HOSPADM

## 2019-07-13 RX ORDER — GABAPENTIN 100 MG/1
200 CAPSULE ORAL 3 TIMES DAILY
Status: DISCONTINUED | OUTPATIENT
Start: 2019-07-13 | End: 2019-07-14

## 2019-07-13 RX ORDER — ONDANSETRON 2 MG/ML
4 INJECTION INTRAMUSCULAR; INTRAVENOUS EVERY 6 HOURS PRN
Status: DISCONTINUED | OUTPATIENT
Start: 2019-07-13 | End: 2019-07-16 | Stop reason: HOSPADM

## 2019-07-13 RX ORDER — NICOTINE 21 MG/24HR
1 PATCH, TRANSDERMAL 24 HOURS TRANSDERMAL DAILY
Status: DISCONTINUED | OUTPATIENT
Start: 2019-07-13 | End: 2019-07-16 | Stop reason: HOSPADM

## 2019-07-13 RX ADMIN — ESCITALOPRAM OXALATE 20 MG: 10 TABLET ORAL at 21:26

## 2019-07-13 RX ADMIN — LORAZEPAM 1 MG: 2 INJECTION INTRAMUSCULAR; INTRAVENOUS at 15:45

## 2019-07-13 RX ADMIN — SODIUM CHLORIDE 1000 ML: 9 INJECTION, SOLUTION INTRAVENOUS at 15:45

## 2019-07-13 RX ADMIN — AMLODIPINE BESYLATE 5 MG: 5 TABLET ORAL at 21:27

## 2019-07-13 RX ADMIN — GABAPENTIN 200 MG: 100 CAPSULE ORAL at 21:26

## 2019-07-13 RX ADMIN — SODIUM CHLORIDE: 9 INJECTION, SOLUTION INTRAVENOUS at 20:41

## 2019-07-13 RX ADMIN — OLOPATADINE HYDROCHLORIDE 1 DROP: 1 SOLUTION/ DROPS OPHTHALMIC at 21:28

## 2019-07-13 RX ADMIN — THIAMINE HYDROCHLORIDE: 100 INJECTION, SOLUTION INTRAMUSCULAR; INTRAVENOUS at 15:30

## 2019-07-13 RX ADMIN — BUSPIRONE HYDROCHLORIDE 10 MG: 10 TABLET ORAL at 21:27

## 2019-07-13 RX ADMIN — ENOXAPARIN SODIUM 40 MG: 40 INJECTION SUBCUTANEOUS at 21:27

## 2019-07-13 RX ADMIN — CHLORDIAZEPOXIDE HYDROCHLORIDE 25 MG: 25 CAPSULE ORAL at 21:36

## 2019-07-13 ASSESSMENT — ENCOUNTER SYMPTOMS
SHORTNESS OF BREATH: 0
NAUSEA: 0
ABDOMINAL PAIN: 0
EYE REDNESS: 0
COUGH: 0
RHINORRHEA: 0
EYE ITCHING: 0
DIARRHEA: 0
BACK PAIN: 0
VOMITING: 0

## 2019-07-13 ASSESSMENT — PAIN DESCRIPTION - DESCRIPTORS: DESCRIPTORS: ACHING

## 2019-07-13 ASSESSMENT — PATIENT HEALTH QUESTIONNAIRE - PHQ9: SUM OF ALL RESPONSES TO PHQ QUESTIONS 1-9: 23

## 2019-07-13 ASSESSMENT — PAIN DESCRIPTION - LOCATION: LOCATION: ABDOMEN

## 2019-07-13 ASSESSMENT — PAIN DESCRIPTION - PAIN TYPE: TYPE: ACUTE PAIN

## 2019-07-13 ASSESSMENT — PAIN DESCRIPTION - PROGRESSION: CLINICAL_PROGRESSION: NOT CHANGED

## 2019-07-13 ASSESSMENT — PAIN SCALES - GENERAL
PAINLEVEL_OUTOF10: 5
PAINLEVEL_OUTOF10: 0

## 2019-07-13 ASSESSMENT — PAIN DESCRIPTION - FREQUENCY: FREQUENCY: INTERMITTENT

## 2019-07-13 ASSESSMENT — PAIN DESCRIPTION - ORIENTATION: ORIENTATION: MID

## 2019-07-13 ASSESSMENT — PAIN - FUNCTIONAL ASSESSMENT: PAIN_FUNCTIONAL_ASSESSMENT: ACTIVITIES ARE NOT PREVENTED

## 2019-07-13 ASSESSMENT — PAIN DESCRIPTION - ONSET: ONSET: ON-GOING

## 2019-07-13 NOTE — ED PROVIDER NOTES
96 (L) 98 - 107 mmol/L    CO2 27 20 - 31 mmol/L    Anion Gap 13 9 - 17 mmol/L    GFR Non-African American >60 >60 mL/min    GFR African American >60 >60 mL/min    GFR Comment          GFR Staging NOT REPORTED    AMMONIA   Result Value Ref Range    Ammonia 31 11 - 51 umol/L   CBC Auto Differential   Result Value Ref Range    WBC 10.9 3.5 - 11.3 k/uL    RBC 3.18 (L) 3.95 - 5.11 m/uL    Hemoglobin 11.8 (L) 11.9 - 15.1 g/dL    Hematocrit 37.3 36.3 - 47.1 %    .3 (H) 82.6 - 102.9 fL    MCH 37.1 (H) 25.2 - 33.5 pg    MCHC 31.6 28.4 - 34.8 g/dL    RDW 15.0 (H) 11.8 - 14.4 %    Platelets 594 433 - 075 k/uL    MPV 11.6 8.1 - 13.5 fL    NRBC Automated 0.0 0.0 per 100 WBC    Differential Type NOT REPORTED     WBC Morphology NOT REPORTED     RBC Morphology NOT REPORTED     Platelet Estimate NOT REPORTED     Immature Granulocytes 1 (H) 0 %    Seg Neutrophils 61 36 - 65 %    Lymphocytes 28 24 - 43 %    Monocytes 9 3 - 12 %    Eosinophils % 1 1 - 4 %    Basophils 0 0 - 2 %    Absolute Immature Granulocyte 0.11 0.00 - 0.30 k/uL    Segs Absolute 6.65 1.50 - 8.10 k/uL    Absolute Lymph # 3.05 1.10 - 3.70 k/uL    Absolute Mono # 0.98 0.10 - 1.20 k/uL    Absolute Eos # 0.11 0.00 - 0.44 k/uL    Basophils # 0.00 0.00 - 0.20 k/uL    Morphology ANISOCYTOSIS PRESENT     Morphology MACROCYTOSIS PRESENT    Protime-INR   Result Value Ref Range    Protime 11.1 9.0 - 12.0 sec    INR 1.1    Ethanol   Result Value Ref Range    Ethanol <10 <10 mg/dL    Ethanol percent <0.010 <0.010 %       IMPRESSION: Nolan Corral is a 47 y.o. presenting with some symptoms of Wernicke's encephalopathy, however patient is oriented x4, appears well on exam.  Patient does have horizontal nystagmus. Family concerned that she is having liver failure, will obtain CBC, BMP, LFTs. IV thiamine folate, social work consult for outpatient/inpatient withdrawal options. Otherwise patient can be admitted to the hospital for detoxification.   Patient to receive 1 mg of Ativan IV as well. Vitals indicate borderline tachycardia, otherwise within normal limits. There is no ascites, no asterixis. Patient has no fever, abdomen is nontender. RADIOLOGY:  None    EKG  None    All EKG's are interpreted by the Emergency Department Physician who either signs or Co-signs this chart in the absence of a cardiologist.    EMERGENCY DEPARTMENT COURSE:  ED Course as of Jul 14 0248   Sat Jul 13, 2019   1642 Meld score 13. Patient informed of test results, instructed she needs to see a GI doctor in the outpatient setting regarding her liver function. Social work exploring different options for inpatient withdrawal in the area, potential admission versus transfer to another facility. [BA]   1909 Admitted to [facility]    [BA]      ED Course User Index  [BA] Rob Cordero MD         PROCEDURES:  None    CONSULTS:  IP CONSULT TO HOSPITALIST  IP CONSULT TO DIETITIAN  IP CONSULT TO SOCIAL WORK  IP CONSULT TO SPIRITUAL SERVICES    CRITICAL CARE:  None    FINAL IMPRESSION      1.  Alcohol withdrawal syndrome without complication ([facility] [address]          DISPOSITION / PLAN     DISPOSITION Admitted 07/13/2019 07:18:25 PM      PATIENT REFERRED TO:  Charley Wu, APRN - CNP  [address] [address]  [phone]            DISCHARGE MEDICATIONS:  Current Discharge Medication List          Rob Cordero MD  Emergency Medicine Resident    (Please note that portions of thisnote were completed with a voice recognition program.  Efforts were made to edit the dictations but occasionally words are mis-transcribed.)        Rob Cordero MD  07/14/19 9833

## 2019-07-13 NOTE — ED PROVIDER NOTES
The patient is a 47Year old alcoholic who is wanting detox and seeking help the pt showiing mild signs of withdraw given lorazepam and awaiting social work to  Determine if she will be going to in patietn rehab or if she needs long acting benzo like diazepam/chlordiazepoxide to prevent withdraw.        Eloise Wagner DO  07/13/19 4728

## 2019-07-14 ENCOUNTER — APPOINTMENT (OUTPATIENT)
Dept: GENERAL RADIOLOGY | Age: 49
DRG: 897 | End: 2019-07-14
Payer: COMMERCIAL

## 2019-07-14 ENCOUNTER — APPOINTMENT (OUTPATIENT)
Dept: MRI IMAGING | Age: 49
DRG: 897 | End: 2019-07-14
Payer: COMMERCIAL

## 2019-07-14 PROBLEM — N39.0 URINARY TRACT INFECTION WITHOUT HEMATURIA: Status: ACTIVE | Noted: 2019-07-14

## 2019-07-14 LAB
-: ABNORMAL
ALBUMIN SERPL-MCNC: 2.6 G/DL (ref 3.5–5.2)
ALBUMIN/GLOBULIN RATIO: 0.7 (ref 1–2.5)
ALP BLD-CCNC: 235 U/L (ref 35–104)
ALT SERPL-CCNC: 60 U/L (ref 5–33)
AMORPHOUS: ABNORMAL
AMPHETAMINE SCREEN URINE: NEGATIVE
ANION GAP SERPL CALCULATED.3IONS-SCNC: 14 MMOL/L (ref 9–17)
AST SERPL-CCNC: 157 U/L
BACTERIA: ABNORMAL
BARBITURATE SCREEN URINE: NEGATIVE
BENZODIAZEPINE SCREEN, URINE: POSITIVE
BILIRUB SERPL-MCNC: 1.43 MG/DL (ref 0.3–1.2)
BILIRUBIN URINE: ABNORMAL
BUN BLDV-MCNC: 10 MG/DL (ref 6–20)
BUN/CREAT BLD: ABNORMAL (ref 9–20)
BUPRENORPHINE URINE: ABNORMAL
CALCIUM IONIZED: 1.17 MMOL/L (ref 1.13–1.33)
CALCIUM SERPL-MCNC: 8.6 MG/DL (ref 8.6–10.4)
CANNABINOID SCREEN URINE: POSITIVE
CASTS UA: ABNORMAL /LPF (ref 0–8)
CHLORIDE BLD-SCNC: 100 MMOL/L (ref 98–107)
CO2: 24 MMOL/L (ref 20–31)
COCAINE METABOLITE, URINE: NEGATIVE
COLOR: ABNORMAL
COMMENT UA: ABNORMAL
CREAT SERPL-MCNC: 0.36 MG/DL (ref 0.5–0.9)
CRYSTALS, UA: ABNORMAL /HPF
EPITHELIAL CELLS UA: ABNORMAL /HPF (ref 0–5)
ESTIMATED AVERAGE GLUCOSE: 77 MG/DL
FOLATE: >20 NG/ML
GFR AFRICAN AMERICAN: >60 ML/MIN
GFR NON-AFRICAN AMERICAN: >60 ML/MIN
GFR SERPL CREATININE-BSD FRML MDRD: ABNORMAL ML/MIN/{1.73_M2}
GFR SERPL CREATININE-BSD FRML MDRD: ABNORMAL ML/MIN/{1.73_M2}
GLUCOSE BLD-MCNC: 70 MG/DL (ref 65–105)
GLUCOSE BLD-MCNC: 78 MG/DL (ref 70–99)
GLUCOSE URINE: NEGATIVE
HAV IGM SER IA-ACNC: NONREACTIVE
HBA1C MFR BLD: 4.3 % (ref 4–6)
HCT VFR BLD CALC: 32.8 % (ref 36.3–47.1)
HEMOGLOBIN: 10.5 G/DL (ref 11.9–15.1)
HEPATITIS B CORE IGM ANTIBODY: NONREACTIVE
HEPATITIS B SURFACE ANTIGEN: NONREACTIVE
HEPATITIS C ANTIBODY: NONREACTIVE
INR BLD: 1
KETONES, URINE: ABNORMAL
LEUKOCYTE ESTERASE, URINE: ABNORMAL
MAGNESIUM: 1.1 MG/DL (ref 1.6–2.6)
MCH RBC QN AUTO: 38.2 PG (ref 25.2–33.5)
MCHC RBC AUTO-ENTMCNC: 32 G/DL (ref 28.4–34.8)
MCV RBC AUTO: 119.3 FL (ref 82.6–102.9)
MDMA URINE: ABNORMAL
METHADONE SCREEN, URINE: NEGATIVE
METHAMPHETAMINE, URINE: ABNORMAL
MUCUS: ABNORMAL
NITRITE, URINE: POSITIVE
NRBC AUTOMATED: 0 PER 100 WBC
OPIATES, URINE: NEGATIVE
OTHER OBSERVATIONS UA: ABNORMAL
OXYCODONE SCREEN URINE: NEGATIVE
PDW BLD-RTO: 15.2 % (ref 11.8–14.4)
PH UA: 6.5 (ref 5–8)
PHENCYCLIDINE, URINE: NEGATIVE
PHOSPHORUS: 4 MG/DL (ref 2.6–4.5)
PLATELET # BLD: 175 K/UL (ref 138–453)
PMV BLD AUTO: 10.8 FL (ref 8.1–13.5)
POTASSIUM SERPL-SCNC: 3.3 MMOL/L (ref 3.7–5.3)
PROPOXYPHENE, URINE: ABNORMAL
PROTEIN UA: NEGATIVE
PROTHROMBIN TIME: 10.2 SEC (ref 9–12)
RBC # BLD: 2.75 M/UL (ref 3.95–5.11)
RBC UA: ABNORMAL /HPF (ref 0–4)
RENAL EPITHELIAL, UA: ABNORMAL /HPF
SODIUM BLD-SCNC: 138 MMOL/L (ref 135–144)
SPECIFIC GRAVITY UA: 1.02 (ref 1–1.03)
TEST INFORMATION: ABNORMAL
TOTAL PROTEIN: 6.1 G/DL (ref 6.4–8.3)
TRICHOMONAS: ABNORMAL
TRICYCLIC ANTIDEPRESSANTS, UR: ABNORMAL
TSH SERPL DL<=0.05 MIU/L-ACNC: 0.39 MIU/L (ref 0.3–5)
TSH SERPL DL<=0.05 MIU/L-ACNC: 0.61 MIU/L (ref 0.3–5)
TURBIDITY: ABNORMAL
URINE HGB: NEGATIVE
UROBILINOGEN, URINE: NORMAL
VITAMIN B-12: 1449 PG/ML (ref 232–1245)
VITAMIN D 25-HYDROXY: 18.1 NG/ML (ref 30–100)
WBC # BLD: 8.5 K/UL (ref 3.5–11.3)
WBC UA: ABNORMAL /HPF (ref 0–5)
YEAST: ABNORMAL

## 2019-07-14 PROCEDURE — 82330 ASSAY OF CALCIUM: CPT

## 2019-07-14 PROCEDURE — 80307 DRUG TEST PRSMV CHEM ANLYZR: CPT

## 2019-07-14 PROCEDURE — 80053 COMPREHEN METABOLIC PANEL: CPT

## 2019-07-14 PROCEDURE — 84425 ASSAY OF VITAMIN B-1: CPT

## 2019-07-14 PROCEDURE — 84207 ASSAY OF VITAMIN B-6: CPT

## 2019-07-14 PROCEDURE — 82607 VITAMIN B-12: CPT

## 2019-07-14 PROCEDURE — 82746 ASSAY OF FOLIC ACID SERUM: CPT

## 2019-07-14 PROCEDURE — 2500000003 HC RX 250 WO HCPCS: Performed by: FAMILY MEDICINE

## 2019-07-14 PROCEDURE — 82947 ASSAY GLUCOSE BLOOD QUANT: CPT

## 2019-07-14 PROCEDURE — 81001 URINALYSIS AUTO W/SCOPE: CPT

## 2019-07-14 PROCEDURE — 80074 ACUTE HEPATITIS PANEL: CPT

## 2019-07-14 PROCEDURE — 6370000000 HC RX 637 (ALT 250 FOR IP): Performed by: FAMILY MEDICINE

## 2019-07-14 PROCEDURE — A9576 INJ PROHANCE MULTIPACK: HCPCS | Performed by: FAMILY MEDICINE

## 2019-07-14 PROCEDURE — 83735 ASSAY OF MAGNESIUM: CPT

## 2019-07-14 PROCEDURE — 70553 MRI BRAIN STEM W/O & W/DYE: CPT

## 2019-07-14 PROCEDURE — 85027 COMPLETE CBC AUTOMATED: CPT

## 2019-07-14 PROCEDURE — 84100 ASSAY OF PHOSPHORUS: CPT

## 2019-07-14 PROCEDURE — 99222 1ST HOSP IP/OBS MODERATE 55: CPT | Performed by: FAMILY MEDICINE

## 2019-07-14 PROCEDURE — 84443 ASSAY THYROID STIM HORMONE: CPT

## 2019-07-14 PROCEDURE — 85610 PROTHROMBIN TIME: CPT

## 2019-07-14 PROCEDURE — 6360000002 HC RX W HCPCS: Performed by: FAMILY MEDICINE

## 2019-07-14 PROCEDURE — 82306 VITAMIN D 25 HYDROXY: CPT

## 2019-07-14 PROCEDURE — 1200000000 HC SEMI PRIVATE

## 2019-07-14 PROCEDURE — 2580000003 HC RX 258: Performed by: FAMILY MEDICINE

## 2019-07-14 PROCEDURE — 74018 RADEX ABDOMEN 1 VIEW: CPT

## 2019-07-14 PROCEDURE — 6360000004 HC RX CONTRAST MEDICATION: Performed by: FAMILY MEDICINE

## 2019-07-14 PROCEDURE — 83036 HEMOGLOBIN GLYCOSYLATED A1C: CPT

## 2019-07-14 PROCEDURE — 36415 COLL VENOUS BLD VENIPUNCTURE: CPT

## 2019-07-14 RX ORDER — SODIUM CHLORIDE 0.9 % (FLUSH) 0.9 %
10 SYRINGE (ML) INJECTION ONCE
Status: DISCONTINUED | OUTPATIENT
Start: 2019-07-14 | End: 2019-07-16 | Stop reason: HOSPADM

## 2019-07-14 RX ORDER — MULTIVITAMIN WITH FOLIC ACID 400 MCG
1 TABLET ORAL DAILY
Status: DISCONTINUED | OUTPATIENT
Start: 2019-07-14 | End: 2019-07-16 | Stop reason: HOSPADM

## 2019-07-14 RX ORDER — MAGNESIUM SULFATE 1 G/100ML
1 INJECTION INTRAVENOUS PRN
Status: DISCONTINUED | OUTPATIENT
Start: 2019-07-14 | End: 2019-07-16 | Stop reason: HOSPADM

## 2019-07-14 RX ORDER — THIAMINE MONONITRATE (VIT B1) 100 MG
100 TABLET ORAL DAILY
Status: DISCONTINUED | OUTPATIENT
Start: 2019-07-15 | End: 2019-07-16 | Stop reason: HOSPADM

## 2019-07-14 RX ORDER — ACAMPROSATE CALCIUM 333 MG/1
666 TABLET, DELAYED RELEASE ORAL 3 TIMES DAILY
Status: DISCONTINUED | OUTPATIENT
Start: 2019-07-14 | End: 2019-07-16 | Stop reason: HOSPADM

## 2019-07-14 RX ORDER — DULOXETIN HYDROCHLORIDE 20 MG/1
40 CAPSULE, DELAYED RELEASE ORAL DAILY
Status: DISCONTINUED | OUTPATIENT
Start: 2019-07-14 | End: 2019-07-16 | Stop reason: HOSPADM

## 2019-07-14 RX ORDER — GUAIFENESIN DEXTROMETHORPHAN HYDROBROMIDE ORAL SOLUTION 10; 100 MG/5ML; MG/5ML
10 SOLUTION ORAL EVERY 4 HOURS PRN
Status: DISCONTINUED | OUTPATIENT
Start: 2019-07-14 | End: 2019-07-16 | Stop reason: HOSPADM

## 2019-07-14 RX ORDER — FOLIC ACID 1 MG/1
1 TABLET ORAL DAILY
Status: DISCONTINUED | OUTPATIENT
Start: 2019-07-15 | End: 2019-07-16 | Stop reason: HOSPADM

## 2019-07-14 RX ORDER — CHLORDIAZEPOXIDE HYDROCHLORIDE 25 MG/1
25 CAPSULE, GELATIN COATED ORAL 3 TIMES DAILY
Status: DISCONTINUED | OUTPATIENT
Start: 2019-07-14 | End: 2019-07-15

## 2019-07-14 RX ORDER — CEFTRIAXONE 1 G/1
1 INJECTION, POWDER, FOR SOLUTION INTRAMUSCULAR; INTRAVENOUS EVERY 24 HOURS
Status: DISCONTINUED | OUTPATIENT
Start: 2019-07-14 | End: 2019-07-14

## 2019-07-14 RX ADMIN — CHLORDIAZEPOXIDE HYDROCHLORIDE 25 MG: 25 CAPSULE ORAL at 22:49

## 2019-07-14 RX ADMIN — ENOXAPARIN SODIUM 40 MG: 40 INJECTION SUBCUTANEOUS at 07:55

## 2019-07-14 RX ADMIN — Medication 10 ML: at 22:52

## 2019-07-14 RX ADMIN — ACAMPROSATE CALCIUM ENTERIC-COATED 666 MG: 333 TABLET, DELAYED RELEASE ORAL at 15:43

## 2019-07-14 RX ADMIN — VITAMIN D, TAB 1000IU (100/BT) 2000 UNITS: 25 TAB at 20:34

## 2019-07-14 RX ADMIN — ACAMPROSATE CALCIUM ENTERIC-COATED 666 MG: 333 TABLET, DELAYED RELEASE ORAL at 20:34

## 2019-07-14 RX ADMIN — AMLODIPINE BESYLATE 5 MG: 5 TABLET ORAL at 07:54

## 2019-07-14 RX ADMIN — POTASSIUM CHLORIDE 40 MEQ: 20 TABLET, EXTENDED RELEASE ORAL at 11:20

## 2019-07-14 RX ADMIN — GABAPENTIN 200 MG: 100 CAPSULE ORAL at 07:54

## 2019-07-14 RX ADMIN — FOLIC ACID: 5 INJECTION, SOLUTION INTRAMUSCULAR; INTRAVENOUS; SUBCUTANEOUS at 15:43

## 2019-07-14 RX ADMIN — CHLORDIAZEPOXIDE HYDROCHLORIDE 25 MG: 25 CAPSULE ORAL at 08:53

## 2019-07-14 RX ADMIN — FOLIC ACID 1 MG: 1 TABLET ORAL at 07:54

## 2019-07-14 RX ADMIN — Medication 100 MG: at 07:54

## 2019-07-14 RX ADMIN — FLUTICASONE PROPIONATE 1 SPRAY: 50 SPRAY, METERED NASAL at 08:53

## 2019-07-14 RX ADMIN — THERA TABS 1 TABLET: TAB at 20:34

## 2019-07-14 RX ADMIN — MAGNESIUM SULFATE HEPTAHYDRATE 1 G: 1 INJECTION, SOLUTION INTRAVENOUS at 09:36

## 2019-07-14 RX ADMIN — CEFTRIAXONE SODIUM 1 G: 1 INJECTION, POWDER, FOR SOLUTION INTRAMUSCULAR; INTRAVENOUS at 17:33

## 2019-07-14 RX ADMIN — Medication 10 ML: at 09:35

## 2019-07-14 RX ADMIN — ESCITALOPRAM OXALATE 20 MG: 10 TABLET ORAL at 07:54

## 2019-07-14 RX ADMIN — BUSPIRONE HYDROCHLORIDE 10 MG: 10 TABLET ORAL at 07:54

## 2019-07-14 RX ADMIN — OLOPATADINE HYDROCHLORIDE 1 DROP: 1 SOLUTION/ DROPS OPHTHALMIC at 07:55

## 2019-07-14 RX ADMIN — DULOXETINE 40 MG: 20 CAPSULE, DELAYED RELEASE ORAL at 15:43

## 2019-07-14 RX ADMIN — GADOTERIDOL 11 ML: 279.3 INJECTION, SOLUTION INTRAVENOUS at 15:37

## 2019-07-14 RX ADMIN — OLOPATADINE HYDROCHLORIDE 1 DROP: 1 SOLUTION/ DROPS OPHTHALMIC at 20:35

## 2019-07-14 ASSESSMENT — PAIN SCALES - GENERAL
PAINLEVEL_OUTOF10: 0

## 2019-07-14 NOTE — H&P
[facility]    HISTORY AND PHYSICAL EXAMINATION            Date:   7/14/2019  Patient name:  Paula Moseley  Date of admission:  7/13/2019  2:56 PM  MRN:   [id number]  Account:  [de-identified]  YOB: 1970  PCP:    LOI Shane CNP  Room:   0316/0316-01  Code Status:    Full Code    Chief Complaint:     Chief Complaint   Patient presents with    Withdrawal       History Obtained From:     patient, electronic medical record    History of Present Illness: This is a 54-year-old female patient who has a past medical history significant for anxiety depression, alcohol abuse with dependence, hypertension has had over 30 years of alcohol use and in the past 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 Patient has had some issues with gait and she had fracture in the right tibial plateau and since then she complained of pain. 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 She was seen by her primary care physician was started on Librium however she was not able to handle the withdrawal by herself at home and she became extremely anxious with associated hallucinations and presented to the ER. Her ethanol and ammonia level were normal however she had abnormal liver function tests, and admitted for alcohol withdrawal.  On further evaluation she was noted to have low thiamine, vitamin D, magnesium, folate along with positive urine for infection. Patient was started on Rocephin and replacement was done with vitamin B 1, folate and vitamin D. An MRI of the brain suggested cerebral atrophy with old injury in the parieto-occipital area. MRI appearance was in concurrence with chronic alcoholic use. Patient did not have any significant benefit with Lexapro and BuSpar so both medication and discontinued and Cymbalta 40 mg was initiated. Neurontin was also stopped. Cymbalta will help with the neuropathy pain as well as her anxiety and depression. Patient was started on acamprosate to help with her alcoholic dependence and we chose acamprosate due to liver dysfunction. Patient and family wanted the patient to go back for inpatient rehabilitation. 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 Authorizing Provider   vitamin B-1 (THIAMINE) 100 MG tablet Take 1 tablet by mouth daily 7/12/19   Ludivina Weiss MD   fluticasone [place]) 50 MCG/ACT nasal spray 1 spray by Nasal route daily 7/12/19 7/11/20  Ludivina Weiss MD   olopatadine (PATANOL) 0.1 % ophthalmic solution Place 1 drop into both eyes 2 times daily 7/12/19 8/11/19  Ludivina Weiss MD   chlordiazePOXIDE (LIBRIUM) 25 MG capsule Take 1 capsule by mouth every 6-8 hours as needed for Anxiety for up to 30 days.  7/11/19 8/10/19  Ludivina Weiss MD   vitamin D (ERGOCALCIFEROL) 44969 units CAPS capsule Take 1 capsule by mouth once a week 6/19/19   Ludivina Weiss MD   folic acid (FOLVITE) 1 MG tablet Take 1 tablet by mouth daily 6/19/19   Ludivina Weiss MD   busPIRone (BUSPAR) 10 MG tablet Take 1 tablet by mouth 3 times daily 6/18/19 7/18/19  Ludivina Weiss MD   Disulfiram (ANTABUSE) 500 MG TABS Take 500 mg by mouth daily 6/18/19   Ludivina Weiss MD   escitalopram (LEXAPRO) 10 MG tablet Take 1 tablet by mouth daily 6/5/19   Leonor WYNN

## 2019-07-14 NOTE — PLAN OF CARE
Problem: Falls - Risk of:  Goal: Will remain free from falls  Description  Will remain free from falls  7/14/2019 1502 by Jerald Lynch RN  Outcome: Ongoing  Patient remained free of falls. Bed in lowest position, non skid socks on, call light within reach and used appropriately, room decluttered.        Problem: Falls - Risk of:  Goal: Absence of physical injury  Description  Absence of physical injury  7/14/2019 1502 by Jerald Lynch RN  Outcome: Ongoing

## 2019-07-15 ENCOUNTER — APPOINTMENT (OUTPATIENT)
Dept: ULTRASOUND IMAGING | Age: 49
DRG: 897 | End: 2019-07-15
Payer: COMMERCIAL

## 2019-07-15 PROCEDURE — 1200000000 HC SEMI PRIVATE

## 2019-07-15 PROCEDURE — 6370000000 HC RX 637 (ALT 250 FOR IP): Performed by: FAMILY MEDICINE

## 2019-07-15 PROCEDURE — 6360000002 HC RX W HCPCS: Performed by: FAMILY MEDICINE

## 2019-07-15 PROCEDURE — 76705 ECHO EXAM OF ABDOMEN: CPT

## 2019-07-15 PROCEDURE — 6370000000 HC RX 637 (ALT 250 FOR IP): Performed by: INTERNAL MEDICINE

## 2019-07-15 PROCEDURE — 99232 SBSQ HOSP IP/OBS MODERATE 35: CPT | Performed by: INTERNAL MEDICINE

## 2019-07-15 PROCEDURE — 2580000003 HC RX 258: Performed by: FAMILY MEDICINE

## 2019-07-15 RX ORDER — CHLORDIAZEPOXIDE HYDROCHLORIDE 5 MG/1
10 CAPSULE, GELATIN COATED ORAL 3 TIMES DAILY
Status: DISCONTINUED | OUTPATIENT
Start: 2019-07-15 | End: 2019-07-16

## 2019-07-15 RX ADMIN — OLOPATADINE HYDROCHLORIDE 1 DROP: 1 SOLUTION/ DROPS OPHTHALMIC at 20:12

## 2019-07-15 RX ADMIN — CEFTRIAXONE SODIUM 1 G: 1 INJECTION, POWDER, FOR SOLUTION INTRAMUSCULAR; INTRAVENOUS at 17:46

## 2019-07-15 RX ADMIN — CHLORDIAZEPOXIDE HYDROCHLORIDE 25 MG: 25 CAPSULE ORAL at 08:40

## 2019-07-15 RX ADMIN — ACAMPROSATE CALCIUM ENTERIC-COATED 666 MG: 333 TABLET, DELAYED RELEASE ORAL at 14:07

## 2019-07-15 RX ADMIN — CHLORDIAZEPOXIDE HYDROCHLORIDE 10 MG: 5 CAPSULE ORAL at 21:58

## 2019-07-15 RX ADMIN — FLUTICASONE PROPIONATE 1 SPRAY: 50 SPRAY, METERED NASAL at 08:39

## 2019-07-15 RX ADMIN — ACAMPROSATE CALCIUM ENTERIC-COATED 666 MG: 333 TABLET, DELAYED RELEASE ORAL at 20:12

## 2019-07-15 RX ADMIN — FOLIC ACID 1 MG: 1 TABLET ORAL at 08:40

## 2019-07-15 RX ADMIN — AMLODIPINE BESYLATE 5 MG: 5 TABLET ORAL at 08:40

## 2019-07-15 RX ADMIN — CHLORDIAZEPOXIDE HYDROCHLORIDE 10 MG: 5 CAPSULE ORAL at 14:07

## 2019-07-15 RX ADMIN — OLOPATADINE HYDROCHLORIDE 1 DROP: 1 SOLUTION/ DROPS OPHTHALMIC at 08:39

## 2019-07-15 RX ADMIN — VITAMIN D, TAB 1000IU (100/BT) 2000 UNITS: 25 TAB at 08:40

## 2019-07-15 RX ADMIN — DULOXETINE 40 MG: 20 CAPSULE, DELAYED RELEASE ORAL at 08:39

## 2019-07-15 RX ADMIN — ENOXAPARIN SODIUM 40 MG: 40 INJECTION SUBCUTANEOUS at 08:39

## 2019-07-15 RX ADMIN — THERA TABS 1 TABLET: TAB at 08:40

## 2019-07-15 RX ADMIN — Medication 100 MG: at 08:40

## 2019-07-15 RX ADMIN — ACAMPROSATE CALCIUM ENTERIC-COATED 666 MG: 333 TABLET, DELAYED RELEASE ORAL at 08:40

## 2019-07-15 ASSESSMENT — PAIN SCALES - GENERAL: PAINLEVEL_OUTOF10: 0

## 2019-07-15 NOTE — CARE COORDINATION
Transitional planning. Dano with [facility] call to inquire about pt discharge and plans. They take pt insurance, but will need to complete a pre-assessment exam on pt prior to admission.    1106 Provided James Hemphill with number [facility] [phone]

## 2019-07-15 NOTE — PLAN OF CARE
Problem: Falls - Risk of:  Goal: Will remain free from falls  Description  Will remain free from falls  7/15/2019 1531 by Elidia Hylton RN  Outcome: Ongoing  Non-skid socks well lite room bed in lowest position      Problem: Falls - Risk of:  Goal: Absence of physical injury  Description  Absence of physical injury  7/15/2019 1531 by Elidia Hylton RN  Outcome: Ongoing     Problem: Nutrition  Goal: Optimal nutrition therapy  7/15/2019 1531 by Elidia Hylton RN  Outcome: Ongoing  Nutritional supplements, pt eating at least 50% of meals     Problem: Discharge Planning:  Goal: Discharged to appropriate level of care  Description  Discharged to appropriate level of care  Outcome: Ongoing  Case manger discussing options for inpatient rehab      Problem: Discharge Planning:  Goal: Discharged to appropriate level of care  Description  Discharged to appropriate level of care  Outcome: Ongoing     Problem: Nutrition Deficit:  Goal: Ability to achieve adequate nutritional intake will improve  Description  Ability to achieve adequate nutritional intake will improve  Outcome: Ongoing

## 2019-07-16 VITALS
WEIGHT: 130 LBS | HEART RATE: 85 BPM | TEMPERATURE: 98.4 F | DIASTOLIC BLOOD PRESSURE: 97 MMHG | OXYGEN SATURATION: 97 % | HEIGHT: 66 IN | BODY MASS INDEX: 20.89 KG/M2 | SYSTOLIC BLOOD PRESSURE: 151 MMHG | RESPIRATION RATE: 18 BRPM

## 2019-07-16 PROCEDURE — 6370000000 HC RX 637 (ALT 250 FOR IP): Performed by: INTERNAL MEDICINE

## 2019-07-16 PROCEDURE — 6370000000 HC RX 637 (ALT 250 FOR IP): Performed by: FAMILY MEDICINE

## 2019-07-16 PROCEDURE — 6360000002 HC RX W HCPCS: Performed by: FAMILY MEDICINE

## 2019-07-16 PROCEDURE — 99239 HOSP IP/OBS DSCHRG MGMT >30: CPT | Performed by: INTERNAL MEDICINE

## 2019-07-16 RX ORDER — CHLORDIAZEPOXIDE HYDROCHLORIDE 5 MG/1
5 CAPSULE, GELATIN COATED ORAL 3 TIMES DAILY
Status: DISCONTINUED | OUTPATIENT
Start: 2019-07-16 | End: 2019-07-16 | Stop reason: HOSPADM

## 2019-07-16 RX ADMIN — ENOXAPARIN SODIUM 40 MG: 40 INJECTION SUBCUTANEOUS at 09:42

## 2019-07-16 RX ADMIN — FOLIC ACID 1 MG: 1 TABLET ORAL at 09:42

## 2019-07-16 RX ADMIN — AMLODIPINE BESYLATE 5 MG: 5 TABLET ORAL at 09:42

## 2019-07-16 RX ADMIN — OLOPATADINE HYDROCHLORIDE 1 DROP: 1 SOLUTION/ DROPS OPHTHALMIC at 09:42

## 2019-07-16 RX ADMIN — VITAMIN D, TAB 1000IU (100/BT) 2000 UNITS: 25 TAB at 09:42

## 2019-07-16 RX ADMIN — FLUTICASONE PROPIONATE 1 SPRAY: 50 SPRAY, METERED NASAL at 09:42

## 2019-07-16 RX ADMIN — DULOXETINE 40 MG: 20 CAPSULE, DELAYED RELEASE ORAL at 09:42

## 2019-07-16 RX ADMIN — CHLORDIAZEPOXIDE HYDROCHLORIDE 5 MG: 5 CAPSULE ORAL at 09:42

## 2019-07-16 RX ADMIN — Medication 100 MG: at 09:42

## 2019-07-16 RX ADMIN — THERA TABS 1 TABLET: TAB at 09:42

## 2019-07-16 RX ADMIN — ACAMPROSATE CALCIUM ENTERIC-COATED 666 MG: 333 TABLET, DELAYED RELEASE ORAL at 09:42

## 2019-07-16 NOTE — DISCHARGE SUMMARY
[address]    Discharge Summary     Patient ID: López Myers  :  1970   MRN: [id number]     ACCOUNT:  [de-identified]   Patient's PCP: LOI Hughes CNP  Admit Date: 2019   Discharge Date: 2019  Length of Stay: 3  Code Status:  Prior  Admitting Physician: Kunal Godwin MD  Discharge Physician: Marisol Montgomery MD     Active Discharge Diagnoses:     Hospital Problem Lists:  Principal Problem:    Alcohol withdrawal hallucinosis ([address]  Active Problems:    Essential hypertension    Depression    Anxiety    Urinary tract infection without hematuria  Resolved Problems:    * No resolved hospital problems. 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 Trevor Fleming was seen by her primary care physician was started on Librium however she was not able to handle the withdrawal by herself at home and she became extremely anxious with associated hallucinations and presented to the ER. Admitted for alcohol withdrawal. Treated with librium. DC to rehab.        Significant therapeutic interventions:   - Labs and medications reviewed   - Completed antibiotics for UTI  - Taper librium  - Continue home medications   - SW/CM following - planning possible alcohol rehab  - Counseled on alcohol cessation   - Medically stable for transfer to rehab     Significant Diagnostic Studies:   Labs / Micro:  CBC:   Lab Results   Component Value Date    WBC 8.5 2019    RBC 2.75 2019    RBC 4.16

## 2019-07-16 NOTE — FLOWSHEET NOTE
Assessment: Patient is a 47 y.o. female who was admitted to the hospital due to \"withdrawal.\" Patient was sleeping in hospital bed, when  visited.  knocked on door, calling patient's name, waking patient from sleep. Patient shared about her symptoms and indicated that she is feeling better today. Patient shared about her anticipated 30-90 day stay at an inpatient rehab facility, expressing some fear and anxiety. Patient shared feelings of grief over the loss of her job and shared that she had been through a depression. Patient spoke about \"coming out of it,\" and \"wanting to do better. \" Patient has good support in her mother and sister, who live nearby. Patient was in good spirits and glad to see  this evening. Intervention:  visited patient per rounding visits and referral from other .  and patient are former colleagues and had built a friendship during the last few years working together.  sat at bedside and provided ministry of touch throughout visit.  provided active listening, a non-judgmental presence, and shared words of encouragement as patient shared about her recent struggles.  reminded patient about the many people who care for her and her wellness. Outcomes: Patient shared gratitude for 's visit and support. Recommendation: 's will make follow-up visit, per request. Amisha Anu can be reached 24/7 via [employer]. Samreen Natarajan     07/15/19 2008   Encounter Summary   Services provided to: Patient   Referral/Consult From: Patient; Other    Support System Parent; Family members;Friends/neighbors   Continue Visiting   (7/15/2019)   Complexity of Encounter Moderate   Length of Encounter 30 minutes   Spiritual Assessment Completed Yes   Spiritual/Spiritism   Type Spiritual support   Assessment Calm; Approachable; Anxious; Coping;Helplessness   Intervention Active listening;Explored feelings, thoughts,

## 2019-07-16 NOTE — CARE COORDINATION
Discharge [facility] Case Management Department  Written by: Estuardo Jacob RN    Patient Name: Ancil Daughters  Attending Provider: Kaya Smith MD  Admit Date: 2019  2:56 PM  MRN: [id number]  Account: [de-identified]                     : 1970  Discharge Date: 19      Disposition: Jony Geronimo, transfer by Nirmal Daily per Mario Wang RN

## 2019-07-17 LAB — VITAMIN B6: 12.5 NMOL/L (ref 20–125)

## 2019-07-18 LAB — VITAMIN B1 WHOLE BLOOD: 158 NMOL/L (ref 70–180)

## 2019-08-13 ENCOUNTER — TELEPHONE (OUTPATIENT)
Dept: FAMILY MEDICINE CLINIC | Age: 49
End: 2019-08-13

## 2019-08-28 RX ORDER — DOXEPIN HYDROCHLORIDE 25 MG/1
25 CAPSULE ORAL NIGHTLY
Qty: 30 CAPSULE | Refills: 2 | Status: SHIPPED | OUTPATIENT
Start: 2019-08-28 | End: 2019-11-26

## 2019-09-10 ENCOUNTER — OFFICE VISIT (OUTPATIENT)
Dept: FAMILY MEDICINE CLINIC | Age: 49
End: 2019-09-10
Payer: COMMERCIAL

## 2019-09-10 VITALS
DIASTOLIC BLOOD PRESSURE: 83 MMHG | WEIGHT: 156.8 LBS | TEMPERATURE: 99.4 F | OXYGEN SATURATION: 96 % | HEIGHT: 66 IN | HEART RATE: 107 BPM | SYSTOLIC BLOOD PRESSURE: 120 MMHG | BODY MASS INDEX: 25.2 KG/M2

## 2019-09-10 DIAGNOSIS — F41.1 GAD (GENERALIZED ANXIETY DISORDER): Primary | ICD-10-CM

## 2019-09-10 DIAGNOSIS — H55.00 NYSTAGMUS: ICD-10-CM

## 2019-09-10 DIAGNOSIS — Z23 FLU VACCINE NEED: ICD-10-CM

## 2019-09-10 DIAGNOSIS — Z12.31 ENCOUNTER FOR SCREENING MAMMOGRAM FOR BREAST CANCER: ICD-10-CM

## 2019-09-10 DIAGNOSIS — F10.20 ALCOHOLISM (HCC): ICD-10-CM

## 2019-09-10 DIAGNOSIS — M79.2 NEUROPATHIC PAIN, LEG, LEFT: ICD-10-CM

## 2019-09-10 DIAGNOSIS — R90.89 ABNORMAL FINDING ON MRI OF BRAIN: ICD-10-CM

## 2019-09-10 PROCEDURE — 90688 IIV4 VACCINE SPLT 0.5 ML IM: CPT | Performed by: INTERNAL MEDICINE

## 2019-09-10 PROCEDURE — 90471 IMMUNIZATION ADMIN: CPT | Performed by: INTERNAL MEDICINE

## 2019-09-10 PROCEDURE — 1111F DSCHRG MED/CURRENT MED MERGE: CPT | Performed by: INTERNAL MEDICINE

## 2019-09-10 PROCEDURE — 99214 OFFICE O/P EST MOD 30 MIN: CPT | Performed by: INTERNAL MEDICINE

## 2019-09-10 RX ORDER — BUSPIRONE HYDROCHLORIDE 10 MG/1
10 TABLET ORAL 3 TIMES DAILY
Qty: 90 TABLET | Refills: 1 | Status: SHIPPED | OUTPATIENT
Start: 2019-09-10 | End: 2019-10-10

## 2019-09-10 RX ORDER — TOPIRAMATE 25 MG/1
25 TABLET ORAL 2 TIMES DAILY
Qty: 60 TABLET | Refills: 3 | COMMUNITY
Start: 2019-09-10 | End: 2019-10-31 | Stop reason: SINTOL

## 2019-09-10 RX ORDER — GABAPENTIN 100 MG/1
100 CAPSULE ORAL 3 TIMES DAILY
Qty: 270 CAPSULE | Refills: 0 | Status: SHIPPED | OUTPATIENT
Start: 2019-09-10 | End: 2019-12-23 | Stop reason: DRUGHIGH

## 2019-09-10 NOTE — PROGRESS NOTES
Patient presents with hospital follow up    Pharmacy Verified     Visit Information    Have you changed or started any medications since your last visit including any over-the-counter medicines, vitamins, or herbal medicines? no   Have you stopped taking any of your medications? Is so, why? -  no  Are you having any side effects from any of your medications? - no    Have you seen any other physician or provider since your last visit? Yes [facility]    Have you had any other diagnostic tests since your last visit? yes - labs   Have you been seen in the emergency room and/or had an admission in a hospital since we last saw you?  yes - [facility]   Have you had your routine dental cleaning in the past 6 months?  no     Do you have an active MyChart account? If no, what is the barrier?   Yes    Patient Care Team:  LOI Mahoney CNP as PCP - General (Certified Nurse Practitioner)  LOI Mahoney CNP as PCP - [facility] Provider    Medical History Review  Past Medical, Family, and Social History reviewed and does contribute to the patient presenting condition    Health Maintenance   Topic Date Due    Breast cancer screen  07/05/2019    Shingles Vaccine (1 of 2) 07/05/2019    Colon cancer screen colonoscopy  07/05/2019    Flu vaccine (1) 09/01/2019    DTaP/Tdap/Td vaccine (1 - Tdap) 12/18/2019 (Originally 7/5/1988)    Cervical cancer screen  12/18/2019 (Originally 7/5/1990)    HIV screen  12/18/2019 (Originally 7/5/1984)    Pneumococcal 0-64 years Vaccine (1 of 1 - PPSV23) 06/18/2020 (Originally 7/5/1975)    Potassium monitoring  07/14/2020    Creatinine monitoring  07/14/2020    Lipid screen  06/18/2024

## 2019-10-04 ENCOUNTER — TELEPHONE (OUTPATIENT)
Dept: FAMILY MEDICINE CLINIC | Age: 49
End: 2019-10-04

## 2019-10-27 ENCOUNTER — APPOINTMENT (OUTPATIENT)
Dept: GENERAL RADIOLOGY | Age: 49
End: 2019-10-27
Payer: COMMERCIAL

## 2019-10-27 ENCOUNTER — APPOINTMENT (OUTPATIENT)
Dept: CT IMAGING | Age: 49
End: 2019-10-27
Payer: COMMERCIAL

## 2019-10-27 ENCOUNTER — HOSPITAL ENCOUNTER (EMERGENCY)
Age: 49
Discharge: HOME | End: 2019-10-28
Attending: EMERGENCY MEDICINE
Payer: COMMERCIAL

## 2019-10-27 VITALS
BODY MASS INDEX: 20.83 KG/M2 | HEART RATE: 86 BPM | DIASTOLIC BLOOD PRESSURE: 88 MMHG | RESPIRATION RATE: 14 BRPM | TEMPERATURE: 99.3 F | SYSTOLIC BLOOD PRESSURE: 143 MMHG | OXYGEN SATURATION: 99 % | HEIGHT: 65 IN | WEIGHT: 125 LBS

## 2019-10-27 DIAGNOSIS — R42 DIZZINESS: Primary | ICD-10-CM

## 2019-10-27 DIAGNOSIS — R42 ORTHOSTATIC DIZZINESS: ICD-10-CM

## 2019-10-27 DIAGNOSIS — R79.0 LOW BLOOD MAGNESIUM: ICD-10-CM

## 2019-10-27 LAB
ABSOLUTE EOS #: <0.03 K/UL (ref 0–0.44)
ABSOLUTE IMMATURE GRANULOCYTE: <0.03 K/UL (ref 0–0.3)
ABSOLUTE LYMPH #: 1.75 K/UL (ref 1.1–3.7)
ABSOLUTE MONO #: 0.35 K/UL (ref 0.1–1.2)
ALBUMIN SERPL-MCNC: 4.1 G/DL (ref 3.5–5.2)
ALBUMIN/GLOBULIN RATIO: 1.2 (ref 1–2.5)
ALP BLD-CCNC: 121 U/L (ref 35–104)
ALT SERPL-CCNC: 31 U/L (ref 5–33)
ANION GAP SERPL CALCULATED.3IONS-SCNC: 19 MMOL/L (ref 9–17)
AST SERPL-CCNC: 77 U/L
BASOPHILS # BLD: 0 % (ref 0–2)
BASOPHILS ABSOLUTE: <0.03 K/UL (ref 0–0.2)
BILIRUB SERPL-MCNC: 1.09 MG/DL (ref 0.3–1.2)
BILIRUBIN URINE: ABNORMAL
BUN BLDV-MCNC: 13 MG/DL (ref 6–20)
BUN/CREAT BLD: ABNORMAL (ref 9–20)
CALCIUM IONIZED: 1.16 MMOL/L (ref 1.13–1.33)
CALCIUM SERPL-MCNC: 9.9 MG/DL (ref 8.6–10.4)
CHLORIDE BLD-SCNC: 95 MMOL/L (ref 98–107)
CO2: 25 MMOL/L (ref 20–31)
COLOR: ABNORMAL
COMMENT UA: ABNORMAL
CREAT SERPL-MCNC: 0.26 MG/DL (ref 0.5–0.9)
DIFFERENTIAL TYPE: ABNORMAL
EOSINOPHILS RELATIVE PERCENT: 0 % (ref 1–4)
GFR AFRICAN AMERICAN: >60 ML/MIN
GFR NON-AFRICAN AMERICAN: >60 ML/MIN
GFR SERPL CREATININE-BSD FRML MDRD: ABNORMAL ML/MIN/{1.73_M2}
GFR SERPL CREATININE-BSD FRML MDRD: ABNORMAL ML/MIN/{1.73_M2}
GLUCOSE BLD-MCNC: 143 MG/DL (ref 70–99)
GLUCOSE URINE: NEGATIVE
HCT VFR BLD CALC: 36.3 % (ref 36.3–47.1)
HEMOGLOBIN: 11.5 G/DL (ref 11.9–15.1)
IMMATURE GRANULOCYTES: 0 %
KETONES, URINE: ABNORMAL
LEUKOCYTE ESTERASE, URINE: ABNORMAL
LYMPHOCYTES # BLD: 25 % (ref 24–43)
MAGNESIUM: 1.2 MG/DL (ref 1.6–2.6)
MCH RBC QN AUTO: 31.9 PG (ref 25.2–33.5)
MCHC RBC AUTO-ENTMCNC: 31.7 G/DL (ref 28.4–34.8)
MCV RBC AUTO: 100.8 FL (ref 82.6–102.9)
MONOCYTES # BLD: 5 % (ref 3–12)
NITRITE, URINE: POSITIVE
NRBC AUTOMATED: 0 PER 100 WBC
PDW BLD-RTO: 19 % (ref 11.8–14.4)
PH UA: >9 (ref 5–8)
PHOSPHORUS: 2.5 MG/DL (ref 2.6–4.5)
PLATELET # BLD: 215 K/UL (ref 138–453)
PLATELET ESTIMATE: ABNORMAL
PMV BLD AUTO: 10.7 FL (ref 8.1–13.5)
POTASSIUM SERPL-SCNC: 3.4 MMOL/L (ref 3.7–5.3)
PROTEIN UA: ABNORMAL
RBC # BLD: 3.6 M/UL (ref 3.95–5.11)
RBC # BLD: ABNORMAL 10*6/UL
SEG NEUTROPHILS: 70 % (ref 36–65)
SEGMENTED NEUTROPHILS ABSOLUTE COUNT: 4.74 K/UL (ref 1.5–8.1)
SODIUM BLD-SCNC: 139 MMOL/L (ref 135–144)
SPECIFIC GRAVITY UA: 1.02 (ref 1–1.03)
TOTAL PROTEIN: 7.6 G/DL (ref 6.4–8.3)
TROPONIN INTERP: NORMAL
TROPONIN T: NORMAL NG/ML
TROPONIN, HIGH SENSITIVITY: <6 NG/L (ref 0–14)
TSH SERPL DL<=0.05 MIU/L-ACNC: 1.9 MIU/L (ref 0.3–5)
TURBIDITY: ABNORMAL
URINE HGB: NEGATIVE
UROBILINOGEN, URINE: NORMAL
WBC # BLD: 6.9 K/UL (ref 3.5–11.3)
WBC # BLD: ABNORMAL 10*3/UL

## 2019-10-27 PROCEDURE — 81003 URINALYSIS AUTO W/O SCOPE: CPT

## 2019-10-27 PROCEDURE — 80053 COMPREHEN METABOLIC PANEL: CPT

## 2019-10-27 PROCEDURE — 83735 ASSAY OF MAGNESIUM: CPT

## 2019-10-27 PROCEDURE — 2580000003 HC RX 258: Performed by: STUDENT IN AN ORGANIZED HEALTH CARE EDUCATION/TRAINING PROGRAM

## 2019-10-27 PROCEDURE — 70450 CT HEAD/BRAIN W/O DYE: CPT

## 2019-10-27 PROCEDURE — 6360000002 HC RX W HCPCS: Performed by: STUDENT IN AN ORGANIZED HEALTH CARE EDUCATION/TRAINING PROGRAM

## 2019-10-27 PROCEDURE — 96376 TX/PRO/DX INJ SAME DRUG ADON: CPT

## 2019-10-27 PROCEDURE — 71046 X-RAY EXAM CHEST 2 VIEWS: CPT

## 2019-10-27 PROCEDURE — 96375 TX/PRO/DX INJ NEW DRUG ADDON: CPT

## 2019-10-27 PROCEDURE — 6360000002 HC RX W HCPCS

## 2019-10-27 PROCEDURE — 85025 COMPLETE CBC W/AUTO DIFF WBC: CPT

## 2019-10-27 PROCEDURE — 84484 ASSAY OF TROPONIN QUANT: CPT

## 2019-10-27 PROCEDURE — 93005 ELECTROCARDIOGRAM TRACING: CPT | Performed by: STUDENT IN AN ORGANIZED HEALTH CARE EDUCATION/TRAINING PROGRAM

## 2019-10-27 PROCEDURE — 82330 ASSAY OF CALCIUM: CPT

## 2019-10-27 PROCEDURE — 6370000000 HC RX 637 (ALT 250 FOR IP): Performed by: STUDENT IN AN ORGANIZED HEALTH CARE EDUCATION/TRAINING PROGRAM

## 2019-10-27 PROCEDURE — 99285 EMERGENCY DEPT VISIT HI MDM: CPT

## 2019-10-27 PROCEDURE — 84100 ASSAY OF PHOSPHORUS: CPT

## 2019-10-27 PROCEDURE — 84443 ASSAY THYROID STIM HORMONE: CPT

## 2019-10-27 RX ORDER — 0.9 % SODIUM CHLORIDE 0.9 %
30 INTRAVENOUS SOLUTION INTRAVENOUS ONCE
Status: COMPLETED | OUTPATIENT
Start: 2019-10-27 | End: 2019-10-27

## 2019-10-27 RX ORDER — PROMETHAZINE HYDROCHLORIDE 25 MG/1
25 SUPPOSITORY RECTAL EVERY 6 HOURS PRN
Qty: 12 SUPPOSITORY | Refills: 0 | Status: SHIPPED | OUTPATIENT
Start: 2019-10-27 | End: 2019-11-03

## 2019-10-27 RX ORDER — MAGNESIUM SULFATE 1 G/100ML
INJECTION INTRAVENOUS
Status: DISCONTINUED
Start: 2019-10-27 | End: 2019-10-27 | Stop reason: WASHOUT

## 2019-10-27 RX ORDER — ONDANSETRON 2 MG/ML
4 INJECTION INTRAMUSCULAR; INTRAVENOUS ONCE
Status: DISCONTINUED | OUTPATIENT
Start: 2019-10-27 | End: 2019-10-28 | Stop reason: HOSPADM

## 2019-10-27 RX ORDER — ONDANSETRON 2 MG/ML
INJECTION INTRAMUSCULAR; INTRAVENOUS
Status: COMPLETED
Start: 2019-10-27 | End: 2019-10-27

## 2019-10-27 RX ORDER — ONDANSETRON 2 MG/ML
4 INJECTION INTRAMUSCULAR; INTRAVENOUS ONCE
Status: COMPLETED | OUTPATIENT
Start: 2019-10-27 | End: 2019-10-27

## 2019-10-27 RX ORDER — ONDANSETRON 4 MG/1
4 TABLET, ORALLY DISINTEGRATING ORAL EVERY 8 HOURS PRN
Qty: 21 TABLET | Refills: 0 | Status: SHIPPED | OUTPATIENT
Start: 2019-10-27 | End: 2019-11-03

## 2019-10-27 RX ADMIN — ONDANSETRON 4 MG: 2 INJECTION INTRAMUSCULAR; INTRAVENOUS at 21:43

## 2019-10-27 RX ADMIN — SODIUM CHLORIDE 1701 ML: 9 INJECTION, SOLUTION INTRAVENOUS at 20:21

## 2019-10-27 RX ADMIN — MAGNESIUM GLUCONATE 500 MG ORAL TABLET 800 MG: 500 TABLET ORAL at 23:27

## 2019-10-27 RX ADMIN — ONDANSETRON 4 MG: 2 INJECTION INTRAMUSCULAR; INTRAVENOUS at 20:22

## 2019-10-28 ENCOUNTER — TELEPHONE (OUTPATIENT)
Dept: FAMILY MEDICINE CLINIC | Age: 49
End: 2019-10-28

## 2019-10-28 ENCOUNTER — HOSPITAL ENCOUNTER (OUTPATIENT)
Dept: MRI IMAGING | Age: 49
Discharge: HOME | End: 2019-10-30
Payer: COMMERCIAL

## 2019-10-28 ENCOUNTER — HOSPITAL ENCOUNTER (OUTPATIENT)
Dept: MAMMOGRAPHY | Age: 49
Discharge: HOME | End: 2019-10-30
Payer: COMMERCIAL

## 2019-10-28 DIAGNOSIS — Z12.31 ENCOUNTER FOR SCREENING MAMMOGRAM FOR BREAST CANCER: ICD-10-CM

## 2019-10-28 DIAGNOSIS — R90.89 ABNORMAL FINDING ON MRI OF BRAIN: ICD-10-CM

## 2019-10-28 DIAGNOSIS — N63.20 LEFT BREAST LUMP: Primary | ICD-10-CM

## 2019-10-28 LAB
CREAT SERPL-MCNC: 0.42 MG/DL (ref 0.5–0.9)
GFR AFRICAN AMERICAN: >60 ML/MIN
GFR NON-AFRICAN AMERICAN: >60 ML/MIN
GFR SERPL CREATININE-BSD FRML MDRD: ABNORMAL ML/MIN/{1.73_M2}
GFR SERPL CREATININE-BSD FRML MDRD: ABNORMAL ML/MIN/{1.73_M2}

## 2019-10-28 PROCEDURE — 6360000002 HC RX W HCPCS: Performed by: STUDENT IN AN ORGANIZED HEALTH CARE EDUCATION/TRAINING PROGRAM

## 2019-10-28 PROCEDURE — 36415 COLL VENOUS BLD VENIPUNCTURE: CPT

## 2019-10-28 PROCEDURE — 96365 THER/PROPH/DIAG IV INF INIT: CPT

## 2019-10-28 PROCEDURE — 6360000004 HC RX CONTRAST MEDICATION: Performed by: INTERNAL MEDICINE

## 2019-10-28 PROCEDURE — 70553 MRI BRAIN STEM W/O & W/DYE: CPT

## 2019-10-28 PROCEDURE — A9579 GAD-BASE MR CONTRAST NOS,1ML: HCPCS | Performed by: INTERNAL MEDICINE

## 2019-10-28 PROCEDURE — 2580000003 HC RX 258: Performed by: STUDENT IN AN ORGANIZED HEALTH CARE EDUCATION/TRAINING PROGRAM

## 2019-10-28 PROCEDURE — 82565 ASSAY OF CREATININE: CPT

## 2019-10-28 RX ORDER — CEPHALEXIN 500 MG/1
500 CAPSULE ORAL 4 TIMES DAILY
Qty: 40 CAPSULE | Refills: 0 | Status: SHIPPED | OUTPATIENT
Start: 2019-10-28 | End: 2019-11-07

## 2019-10-28 RX ADMIN — MAGNESIUM SULFATE HEPTAHYDRATE 2 G: 500 INJECTION, SOLUTION INTRAMUSCULAR; INTRAVENOUS at 00:04

## 2019-10-28 RX ADMIN — GADOTERIDOL 12 ML: 279.3 INJECTION, SOLUTION INTRAVENOUS at 15:28

## 2019-10-29 LAB
EKG ATRIAL RATE: 77 BPM
EKG P AXIS: 42 DEGREES
EKG P-R INTERVAL: 200 MS
EKG Q-T INTERVAL: 412 MS
EKG QRS DURATION: 92 MS
EKG QTC CALCULATION (BAZETT): 466 MS
EKG R AXIS: 74 DEGREES
EKG T AXIS: 65 DEGREES
EKG VENTRICULAR RATE: 77 BPM

## 2019-10-29 PROCEDURE — 93010 ELECTROCARDIOGRAM REPORT: CPT | Performed by: INTERNAL MEDICINE

## 2019-10-31 ENCOUNTER — OFFICE VISIT (OUTPATIENT)
Dept: PSYCHIATRY | Age: 49
End: 2019-10-31
Payer: COMMERCIAL

## 2019-10-31 VITALS
HEART RATE: 94 BPM | SYSTOLIC BLOOD PRESSURE: 109 MMHG | HEIGHT: 65 IN | DIASTOLIC BLOOD PRESSURE: 76 MMHG | WEIGHT: 129.6 LBS | BODY MASS INDEX: 21.59 KG/M2 | OXYGEN SATURATION: 97 %

## 2019-10-31 DIAGNOSIS — F41.0 PANIC DISORDER: ICD-10-CM

## 2019-10-31 DIAGNOSIS — F10.21 ALCOHOL USE DISORDER, SEVERE, IN EARLY REMISSION (HCC): ICD-10-CM

## 2019-10-31 DIAGNOSIS — F32.A DEPRESSION, UNSPECIFIED DEPRESSION TYPE: Primary | ICD-10-CM

## 2019-10-31 PROCEDURE — 99205 OFFICE O/P NEW HI 60 MIN: CPT | Performed by: PSYCHIATRY & NEUROLOGY

## 2019-10-31 RX ORDER — ACAMPROSATE CALCIUM 333 MG/1
666 TABLET, DELAYED RELEASE ORAL 3 TIMES DAILY
Qty: 90 TABLET | Refills: 1 | Status: SHIPPED | OUTPATIENT
Start: 2019-10-31 | End: 2019-11-26 | Stop reason: SDUPTHER

## 2019-10-31 RX ORDER — BUSPIRONE HYDROCHLORIDE 10 MG/1
10 TABLET ORAL 3 TIMES DAILY
COMMUNITY
End: 2019-12-10 | Stop reason: SDUPTHER

## 2019-11-06 ENCOUNTER — HOSPITAL ENCOUNTER (OUTPATIENT)
Dept: ULTRASOUND IMAGING | Age: 49
Discharge: HOME | End: 2019-11-08
Payer: COMMERCIAL

## 2019-11-06 ENCOUNTER — HOSPITAL ENCOUNTER (OUTPATIENT)
Dept: MAMMOGRAPHY | Age: 49
Discharge: HOME | End: 2019-11-08
Payer: COMMERCIAL

## 2019-11-06 DIAGNOSIS — Z12.31 ENCOUNTER FOR SCREENING MAMMOGRAM FOR BREAST CANCER: ICD-10-CM

## 2019-11-06 DIAGNOSIS — N63.20 LEFT BREAST LUMP: ICD-10-CM

## 2019-11-06 PROCEDURE — G0279 TOMOSYNTHESIS, MAMMO: HCPCS

## 2019-11-06 PROCEDURE — 76642 ULTRASOUND BREAST LIMITED: CPT

## 2019-11-14 ENCOUNTER — OFFICE VISIT (OUTPATIENT)
Dept: FAMILY MEDICINE CLINIC | Age: 49
End: 2019-11-14
Payer: COMMERCIAL

## 2019-11-14 VITALS
DIASTOLIC BLOOD PRESSURE: 82 MMHG | HEIGHT: 65 IN | WEIGHT: 126.8 LBS | SYSTOLIC BLOOD PRESSURE: 119 MMHG | BODY MASS INDEX: 21.13 KG/M2 | OXYGEN SATURATION: 97 % | TEMPERATURE: 97.6 F | HEART RATE: 98 BPM

## 2019-11-14 DIAGNOSIS — H55.00 NYSTAGMUS: ICD-10-CM

## 2019-11-14 DIAGNOSIS — R74.8 ELEVATED LIVER ENZYMES: ICD-10-CM

## 2019-11-14 DIAGNOSIS — Z12.11 SCREENING FOR COLON CANCER: ICD-10-CM

## 2019-11-14 DIAGNOSIS — E87.6 HYPOKALEMIA: ICD-10-CM

## 2019-11-14 DIAGNOSIS — Z23 NEED FOR PROPHYLACTIC VACCINATION AND INOCULATION AGAINST VARICELLA: ICD-10-CM

## 2019-11-14 DIAGNOSIS — F41.1 GAD (GENERALIZED ANXIETY DISORDER): Primary | ICD-10-CM

## 2019-11-14 DIAGNOSIS — E83.42 HYPOMAGNESEMIA: ICD-10-CM

## 2019-11-14 DIAGNOSIS — F33.1 MODERATE EPISODE OF RECURRENT MAJOR DEPRESSIVE DISORDER (HCC): ICD-10-CM

## 2019-11-14 PROCEDURE — 99214 OFFICE O/P EST MOD 30 MIN: CPT | Performed by: INTERNAL MEDICINE

## 2019-11-14 RX ORDER — HYDROXYZINE PAMOATE 25 MG/1
25 CAPSULE ORAL 2 TIMES DAILY PRN
Qty: 45 CAPSULE | Refills: 1 | Status: SHIPPED | OUTPATIENT
Start: 2019-11-14 | End: 2019-11-26 | Stop reason: ALTCHOICE

## 2019-11-19 DIAGNOSIS — I10 ESSENTIAL HYPERTENSION: ICD-10-CM

## 2019-11-19 RX ORDER — AMLODIPINE BESYLATE 5 MG/1
5 TABLET ORAL DAILY
Qty: 90 TABLET | Refills: 1 | Status: SHIPPED | OUTPATIENT
Start: 2019-11-19 | End: 2020-06-11 | Stop reason: SDUPTHER

## 2019-11-26 ENCOUNTER — OFFICE VISIT (OUTPATIENT)
Dept: PSYCHIATRY | Age: 49
End: 2019-11-26
Payer: COMMERCIAL

## 2019-11-26 VITALS
BODY MASS INDEX: 20.99 KG/M2 | WEIGHT: 126 LBS | OXYGEN SATURATION: 95 % | HEART RATE: 91 BPM | HEIGHT: 65 IN | DIASTOLIC BLOOD PRESSURE: 79 MMHG | SYSTOLIC BLOOD PRESSURE: 107 MMHG

## 2019-11-26 DIAGNOSIS — F32.A DEPRESSION, UNSPECIFIED DEPRESSION TYPE: ICD-10-CM

## 2019-11-26 DIAGNOSIS — F41.9 ANXIETY: ICD-10-CM

## 2019-11-26 DIAGNOSIS — F41.1 GAD (GENERALIZED ANXIETY DISORDER): ICD-10-CM

## 2019-11-26 PROCEDURE — 99215 OFFICE O/P EST HI 40 MIN: CPT | Performed by: PSYCHIATRY & NEUROLOGY

## 2019-11-26 RX ORDER — ESCITALOPRAM OXALATE 10 MG/1
15 TABLET ORAL DAILY
Qty: 45 TABLET | Refills: 0 | Status: SHIPPED | OUTPATIENT
Start: 2019-11-26 | End: 2019-12-10

## 2019-11-26 RX ORDER — TRAZODONE HYDROCHLORIDE 50 MG/1
50 TABLET ORAL NIGHTLY
Qty: 90 TABLET | Refills: 1 | Status: SHIPPED | OUTPATIENT
Start: 2019-11-26 | End: 2019-12-10 | Stop reason: SDUPTHER

## 2019-11-26 RX ORDER — ACAMPROSATE CALCIUM 333 MG/1
666 TABLET, DELAYED RELEASE ORAL 3 TIMES DAILY
Qty: 90 TABLET | Refills: 1 | Status: SHIPPED | OUTPATIENT
Start: 2019-11-26 | End: 2019-12-10 | Stop reason: SDUPTHER

## 2019-12-10 ENCOUNTER — OFFICE VISIT (OUTPATIENT)
Dept: PSYCHIATRY | Age: 49
End: 2019-12-10
Payer: COMMERCIAL

## 2019-12-10 VITALS
HEIGHT: 65 IN | OXYGEN SATURATION: 90 % | SYSTOLIC BLOOD PRESSURE: 121 MMHG | WEIGHT: 134.5 LBS | HEART RATE: 93 BPM | DIASTOLIC BLOOD PRESSURE: 79 MMHG | BODY MASS INDEX: 22.41 KG/M2

## 2019-12-10 DIAGNOSIS — F10.21 ALCOHOL USE DISORDER, SEVERE, IN EARLY REMISSION (HCC): ICD-10-CM

## 2019-12-10 DIAGNOSIS — F41.9 ANXIETY: ICD-10-CM

## 2019-12-10 DIAGNOSIS — F41.1 GAD (GENERALIZED ANXIETY DISORDER): Primary | ICD-10-CM

## 2019-12-10 DIAGNOSIS — F32.A DEPRESSION, UNSPECIFIED DEPRESSION TYPE: ICD-10-CM

## 2019-12-10 PROCEDURE — 99213 OFFICE O/P EST LOW 20 MIN: CPT | Performed by: NURSE PRACTITIONER

## 2019-12-10 RX ORDER — ACAMPROSATE CALCIUM 333 MG/1
666 TABLET, DELAYED RELEASE ORAL 3 TIMES DAILY
Qty: 180 TABLET | Refills: 0 | Status: ON HOLD | OUTPATIENT
Start: 2019-12-10 | End: 2020-10-26 | Stop reason: HOSPADM

## 2019-12-10 RX ORDER — TRAZODONE HYDROCHLORIDE 50 MG/1
50 TABLET ORAL NIGHTLY
Qty: 90 TABLET | Refills: 1 | Status: SHIPPED | OUTPATIENT
Start: 2019-12-10 | End: 2020-01-14

## 2019-12-10 RX ORDER — BUSPIRONE HYDROCHLORIDE 10 MG/1
10 TABLET ORAL 3 TIMES DAILY
Qty: 90 TABLET | Refills: 0 | Status: SHIPPED | OUTPATIENT
Start: 2019-12-10 | End: 2020-01-09

## 2019-12-10 RX ORDER — ESCITALOPRAM OXALATE 20 MG/1
20 TABLET ORAL DAILY
Qty: 30 TABLET | Refills: 0 | Status: SHIPPED | OUTPATIENT
Start: 2019-12-10 | End: 2020-06-11

## 2019-12-13 ENCOUNTER — APPOINTMENT (OUTPATIENT)
Dept: GENERAL RADIOLOGY | Age: 49
DRG: 897 | End: 2019-12-13
Payer: OTHER MISCELLANEOUS

## 2019-12-13 ENCOUNTER — HOSPITAL ENCOUNTER (INPATIENT)
Age: 49
LOS: 3 days | Discharge: HOME HEALTH | DRG: 897 | End: 2019-12-17
Attending: EMERGENCY MEDICINE | Admitting: INTERNAL MEDICINE
Payer: OTHER MISCELLANEOUS

## 2019-12-13 ENCOUNTER — APPOINTMENT (OUTPATIENT)
Dept: CT IMAGING | Age: 49
DRG: 897 | End: 2019-12-13
Payer: OTHER MISCELLANEOUS

## 2019-12-13 LAB
ABSOLUTE EOS #: <0.03 K/UL (ref 0–0.44)
ABSOLUTE IMMATURE GRANULOCYTE: 0.13 K/UL (ref 0–0.3)
ABSOLUTE LYMPH #: 2.76 K/UL (ref 1.1–3.7)
ABSOLUTE MONO #: 0.86 K/UL (ref 0.1–1.2)
ALBUMIN SERPL-MCNC: 3.3 G/DL (ref 3.5–5.2)
ALBUMIN/GLOBULIN RATIO: 0.9 (ref 1–2.5)
ALP BLD-CCNC: 131 U/L (ref 35–104)
ALT SERPL-CCNC: 14 U/L (ref 5–33)
ANION GAP SERPL CALCULATED.3IONS-SCNC: 18 MMOL/L (ref 9–17)
AST SERPL-CCNC: 46 U/L
BASOPHILS # BLD: 0 % (ref 0–2)
BASOPHILS ABSOLUTE: <0.03 K/UL (ref 0–0.2)
BILIRUB SERPL-MCNC: 0.27 MG/DL (ref 0.3–1.2)
BUN BLDV-MCNC: 8 MG/DL (ref 6–20)
BUN/CREAT BLD: ABNORMAL (ref 9–20)
CALCIUM SERPL-MCNC: 9.3 MG/DL (ref 8.6–10.4)
CHLORIDE BLD-SCNC: 96 MMOL/L (ref 98–107)
CO2: 24 MMOL/L (ref 20–31)
CREAT SERPL-MCNC: 0.33 MG/DL (ref 0.5–0.9)
DIFFERENTIAL TYPE: ABNORMAL
EOSINOPHILS RELATIVE PERCENT: 0 % (ref 1–4)
ETHANOL PERCENT: 0.14 %
ETHANOL: 139 MG/DL
GFR AFRICAN AMERICAN: >60 ML/MIN
GFR NON-AFRICAN AMERICAN: >60 ML/MIN
GFR SERPL CREATININE-BSD FRML MDRD: ABNORMAL ML/MIN/{1.73_M2}
GFR SERPL CREATININE-BSD FRML MDRD: ABNORMAL ML/MIN/{1.73_M2}
GLUCOSE BLD-MCNC: 100 MG/DL (ref 70–99)
HCT VFR BLD CALC: 33.3 % (ref 36.3–47.1)
HEMOGLOBIN: 10.7 G/DL (ref 11.9–15.1)
IMMATURE GRANULOCYTES: 1 %
LYMPHOCYTES # BLD: 25 % (ref 24–43)
MCH RBC QN AUTO: 33.3 PG (ref 25.2–33.5)
MCHC RBC AUTO-ENTMCNC: 32.1 G/DL (ref 28.4–34.8)
MCV RBC AUTO: 103.7 FL (ref 82.6–102.9)
MONOCYTES # BLD: 8 % (ref 3–12)
NRBC AUTOMATED: 0 PER 100 WBC
PDW BLD-RTO: 16.7 % (ref 11.8–14.4)
PLATELET # BLD: 274 K/UL (ref 138–453)
PLATELET ESTIMATE: ABNORMAL
PMV BLD AUTO: 10.5 FL (ref 8.1–13.5)
POTASSIUM SERPL-SCNC: 3.4 MMOL/L (ref 3.7–5.3)
PROLACTIN: 11.04 UG/L (ref 4.79–23.3)
RBC # BLD: 3.21 M/UL (ref 3.95–5.11)
RBC # BLD: ABNORMAL 10*6/UL
SEG NEUTROPHILS: 66 % (ref 36–65)
SEGMENTED NEUTROPHILS ABSOLUTE COUNT: 7.41 K/UL (ref 1.5–8.1)
SODIUM BLD-SCNC: 138 MMOL/L (ref 135–144)
TOTAL PROTEIN: 7.1 G/DL (ref 6.4–8.3)
TROPONIN INTERP: ABNORMAL
TROPONIN INTERP: NORMAL
TROPONIN T: ABNORMAL NG/ML
TROPONIN T: NORMAL NG/ML
TROPONIN, HIGH SENSITIVITY: 12 NG/L (ref 0–14)
TROPONIN, HIGH SENSITIVITY: 15 NG/L (ref 0–14)
WBC # BLD: 11.2 K/UL (ref 3.5–11.3)
WBC # BLD: ABNORMAL 10*3/UL

## 2019-12-13 PROCEDURE — 93005 ELECTROCARDIOGRAM TRACING: CPT | Performed by: STUDENT IN AN ORGANIZED HEALTH CARE EDUCATION/TRAINING PROGRAM

## 2019-12-13 PROCEDURE — 70450 CT HEAD/BRAIN W/O DYE: CPT

## 2019-12-13 PROCEDURE — 71046 X-RAY EXAM CHEST 2 VIEWS: CPT

## 2019-12-13 PROCEDURE — 80053 COMPREHEN METABOLIC PANEL: CPT

## 2019-12-13 PROCEDURE — 85025 COMPLETE CBC W/AUTO DIFF WBC: CPT

## 2019-12-13 PROCEDURE — 84484 ASSAY OF TROPONIN QUANT: CPT

## 2019-12-13 PROCEDURE — G0480 DRUG TEST DEF 1-7 CLASSES: HCPCS

## 2019-12-13 PROCEDURE — 99285 EMERGENCY DEPT VISIT HI MDM: CPT

## 2019-12-13 PROCEDURE — 84146 ASSAY OF PROLACTIN: CPT

## 2019-12-13 PROCEDURE — 72125 CT NECK SPINE W/O DYE: CPT

## 2019-12-13 PROCEDURE — 83735 ASSAY OF MAGNESIUM: CPT

## 2019-12-13 ASSESSMENT — ENCOUNTER SYMPTOMS
VOMITING: 0
RHINORRHEA: 1
ABDOMINAL PAIN: 0
COUGH: 1
BACK PAIN: 1
SHORTNESS OF BREATH: 1
BLOOD IN STOOL: 0
DIARRHEA: 1
EYE REDNESS: 0
SORE THROAT: 1
NAUSEA: 0
EYE ITCHING: 0

## 2019-12-14 PROBLEM — R41.82 ALTERED MENTAL STATUS: Status: ACTIVE | Noted: 2019-12-14

## 2019-12-14 PROBLEM — E87.6 HYPOKALEMIA: Status: ACTIVE | Noted: 2019-12-14

## 2019-12-14 PROBLEM — F10.931 ALCOHOL WITHDRAWAL SYNDROME, WITH DELIRIUM (HCC): Status: ACTIVE | Noted: 2019-12-14

## 2019-12-14 PROBLEM — G62.1 ALCOHOLIC PERIPHERAL NEUROPATHY (HCC): Status: ACTIVE | Noted: 2019-12-14

## 2019-12-14 PROBLEM — D53.9 MACROCYTIC ANEMIA: Status: ACTIVE | Noted: 2019-12-14
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
## 2019-12-14 LAB
ALBUMIN SERPL-MCNC: 3.5 G/DL (ref 3.5–5.2)
ALBUMIN/GLOBULIN RATIO: 1 (ref 1–2.5)
ALP BLD-CCNC: 133 U/L (ref 35–104)
ALT SERPL-CCNC: 13 U/L (ref 5–33)
ANION GAP SERPL CALCULATED.3IONS-SCNC: 16 MMOL/L (ref 9–17)
AST SERPL-CCNC: 43 U/L
BILIRUB SERPL-MCNC: 0.51 MG/DL (ref 0.3–1.2)
BUN BLDV-MCNC: 6 MG/DL (ref 6–20)
BUN/CREAT BLD: ABNORMAL (ref 9–20)
CALCIUM SERPL-MCNC: 8.9 MG/DL (ref 8.6–10.4)
CHLORIDE BLD-SCNC: 98 MMOL/L (ref 98–107)
CO2: 27 MMOL/L (ref 20–31)
CREAT SERPL-MCNC: 0.27 MG/DL (ref 0.5–0.9)
EKG ATRIAL RATE: 103 BPM
EKG P AXIS: 64 DEGREES
EKG P-R INTERVAL: 202 MS
EKG Q-T INTERVAL: 362 MS
EKG QRS DURATION: 82 MS
EKG QTC CALCULATION (BAZETT): 474 MS
EKG R AXIS: 84 DEGREES
EKG T AXIS: 82 DEGREES
EKG VENTRICULAR RATE: 103 BPM
GFR AFRICAN AMERICAN: >60 ML/MIN
GFR NON-AFRICAN AMERICAN: >60 ML/MIN
GFR SERPL CREATININE-BSD FRML MDRD: ABNORMAL ML/MIN/{1.73_M2}
GFR SERPL CREATININE-BSD FRML MDRD: ABNORMAL ML/MIN/{1.73_M2}
GLUCOSE BLD-MCNC: 110 MG/DL (ref 70–99)
HCT VFR BLD CALC: 33.5 % (ref 36.3–47.1)
HEMOGLOBIN: 11 G/DL (ref 11.9–15.1)
INR BLD: 1
MAGNESIUM: 1.3 MG/DL (ref 1.6–2.6)
MAGNESIUM: 1.7 MG/DL (ref 1.6–2.6)
MCH RBC QN AUTO: 33 PG (ref 25.2–33.5)
MCHC RBC AUTO-ENTMCNC: 32.8 G/DL (ref 28.4–34.8)
MCV RBC AUTO: 100.6 FL (ref 82.6–102.9)
NRBC AUTOMATED: 0 PER 100 WBC
PDW BLD-RTO: 16.5 % (ref 11.8–14.4)
PLATELET # BLD: 282 K/UL (ref 138–453)
PMV BLD AUTO: 10.6 FL (ref 8.1–13.5)
POTASSIUM SERPL-SCNC: 3.5 MMOL/L (ref 3.7–5.3)
PROTHROMBIN TIME: 10.2 SEC (ref 9–12)
RBC # BLD: 3.33 M/UL (ref 3.95–5.11)
SODIUM BLD-SCNC: 141 MMOL/L (ref 135–144)
TOTAL PROTEIN: 7.1 G/DL (ref 6.4–8.3)
WBC # BLD: 17.8 K/UL (ref 3.5–11.3)

## 2019-12-14 PROCEDURE — 85610 PROTHROMBIN TIME: CPT

## 2019-12-14 PROCEDURE — 96366 THER/PROPH/DIAG IV INF ADDON: CPT

## 2019-12-14 PROCEDURE — 96365 THER/PROPH/DIAG IV INF INIT: CPT

## 2019-12-14 PROCEDURE — 97161 PT EVAL LOW COMPLEX 20 MIN: CPT

## 2019-12-14 PROCEDURE — 6360000002 HC RX W HCPCS: Performed by: NURSE PRACTITIONER

## 2019-12-14 PROCEDURE — 6370000000 HC RX 637 (ALT 250 FOR IP): Performed by: NURSE PRACTITIONER

## 2019-12-14 PROCEDURE — 80053 COMPREHEN METABOLIC PANEL: CPT

## 2019-12-14 PROCEDURE — 97530 THERAPEUTIC ACTIVITIES: CPT

## 2019-12-14 PROCEDURE — 6360000002 HC RX W HCPCS: Performed by: STUDENT IN AN ORGANIZED HEALTH CARE EDUCATION/TRAINING PROGRAM

## 2019-12-14 PROCEDURE — 96367 TX/PROPH/DG ADDL SEQ IV INF: CPT

## 2019-12-14 PROCEDURE — 83735 ASSAY OF MAGNESIUM: CPT

## 2019-12-14 PROCEDURE — 36415 COLL VENOUS BLD VENIPUNCTURE: CPT

## 2019-12-14 PROCEDURE — 1200000000 HC SEMI PRIVATE

## 2019-12-14 PROCEDURE — 2580000003 HC RX 258: Performed by: NURSE PRACTITIONER

## 2019-12-14 PROCEDURE — 96375 TX/PRO/DX INJ NEW DRUG ADDON: CPT

## 2019-12-14 PROCEDURE — 99223 1ST HOSP IP/OBS HIGH 75: CPT | Performed by: INTERNAL MEDICINE

## 2019-12-14 PROCEDURE — G0378 HOSPITAL OBSERVATION PER HR: HCPCS

## 2019-12-14 PROCEDURE — 96376 TX/PRO/DX INJ SAME DRUG ADON: CPT

## 2019-12-14 PROCEDURE — 96361 HYDRATE IV INFUSION ADD-ON: CPT

## 2019-12-14 PROCEDURE — 2580000003 HC RX 258: Performed by: INTERNAL MEDICINE

## 2019-12-14 PROCEDURE — 2500000003 HC RX 250 WO HCPCS: Performed by: INTERNAL MEDICINE

## 2019-12-14 PROCEDURE — 85027 COMPLETE CBC AUTOMATED: CPT

## 2019-12-14 PROCEDURE — 6360000002 HC RX W HCPCS: Performed by: INTERNAL MEDICINE

## 2019-12-14 RX ORDER — POTASSIUM CHLORIDE 7.45 MG/ML
10 INJECTION INTRAVENOUS PRN
Status: DISCONTINUED | OUTPATIENT
Start: 2019-12-14 | End: 2019-12-17 | Stop reason: HOSPADM

## 2019-12-14 RX ORDER — SODIUM CHLORIDE 0.9 % (FLUSH) 0.9 %
10 SYRINGE (ML) INJECTION EVERY 12 HOURS SCHEDULED
Status: DISCONTINUED | OUTPATIENT
Start: 2019-12-14 | End: 2019-12-17 | Stop reason: HOSPADM

## 2019-12-14 RX ORDER — LORAZEPAM 2 MG/ML
2 INJECTION INTRAMUSCULAR EVERY 4 HOURS PRN
Status: DISCONTINUED | OUTPATIENT
Start: 2019-12-14 | End: 2019-12-17 | Stop reason: HOSPADM

## 2019-12-14 RX ORDER — LORAZEPAM 1 MG/1
3 TABLET ORAL
Status: DISCONTINUED | OUTPATIENT
Start: 2019-12-14 | End: 2019-12-17 | Stop reason: HOSPADM

## 2019-12-14 RX ORDER — LORAZEPAM 2 MG/ML
2 INJECTION INTRAMUSCULAR
Status: DISCONTINUED | OUTPATIENT
Start: 2019-12-14 | End: 2019-12-17 | Stop reason: HOSPADM

## 2019-12-14 RX ORDER — THIAMINE MONONITRATE (VIT B1) 100 MG
100 TABLET ORAL DAILY
Status: DISCONTINUED | OUTPATIENT
Start: 2019-12-14 | End: 2019-12-17 | Stop reason: HOSPADM

## 2019-12-14 RX ORDER — LORAZEPAM 1 MG/1
1 TABLET ORAL
Status: DISCONTINUED | OUTPATIENT
Start: 2019-12-14 | End: 2019-12-17 | Stop reason: HOSPADM

## 2019-12-14 RX ORDER — DEXTROSE, SODIUM CHLORIDE, AND POTASSIUM CHLORIDE 5; .45; .15 G/100ML; G/100ML; G/100ML
INJECTION INTRAVENOUS CONTINUOUS
Status: DISCONTINUED | OUTPATIENT
Start: 2019-12-14 | End: 2019-12-17 | Stop reason: HOSPADM

## 2019-12-14 RX ORDER — TRAZODONE HYDROCHLORIDE 50 MG/1
50 TABLET ORAL NIGHTLY
Status: DISCONTINUED | OUTPATIENT
Start: 2019-12-14 | End: 2019-12-17 | Stop reason: HOSPADM

## 2019-12-14 RX ORDER — SODIUM CHLORIDE 0.9 % (FLUSH) 0.9 %
10 SYRINGE (ML) INJECTION PRN
Status: DISCONTINUED | OUTPATIENT
Start: 2019-12-14 | End: 2019-12-17 | Stop reason: HOSPADM

## 2019-12-14 RX ORDER — 0.9 % SODIUM CHLORIDE 0.9 %
500 INTRAVENOUS SOLUTION INTRAVENOUS ONCE
Status: COMPLETED | OUTPATIENT
Start: 2019-12-14 | End: 2019-12-14

## 2019-12-14 RX ORDER — NICOTINE 21 MG/24HR
1 PATCH, TRANSDERMAL 24 HOURS TRANSDERMAL DAILY PRN
Status: DISCONTINUED | OUTPATIENT
Start: 2019-12-14 | End: 2019-12-17 | Stop reason: HOSPADM

## 2019-12-14 RX ORDER — LORAZEPAM 2 MG/ML
3 INJECTION INTRAMUSCULAR
Status: DISCONTINUED | OUTPATIENT
Start: 2019-12-14 | End: 2019-12-17 | Stop reason: HOSPADM

## 2019-12-14 RX ORDER — LORAZEPAM 2 MG/ML
4 INJECTION INTRAMUSCULAR
Status: DISCONTINUED | OUTPATIENT
Start: 2019-12-14 | End: 2019-12-17 | Stop reason: HOSPADM

## 2019-12-14 RX ORDER — ERGOCALCIFEROL 1.25 MG/1
50000 CAPSULE ORAL WEEKLY
Status: DISCONTINUED | OUTPATIENT
Start: 2019-12-14 | End: 2019-12-17 | Stop reason: HOSPADM

## 2019-12-14 RX ORDER — LORAZEPAM 2 MG/ML
1 INJECTION INTRAMUSCULAR
Status: DISCONTINUED | OUTPATIENT
Start: 2019-12-14 | End: 2019-12-17 | Stop reason: HOSPADM

## 2019-12-14 RX ORDER — SODIUM CHLORIDE 9 MG/ML
INJECTION, SOLUTION INTRAVENOUS CONTINUOUS
Status: DISCONTINUED | OUTPATIENT
Start: 2019-12-14 | End: 2019-12-14

## 2019-12-14 RX ORDER — AMLODIPINE BESYLATE 5 MG/1
5 TABLET ORAL DAILY
Status: DISCONTINUED | OUTPATIENT
Start: 2019-12-14 | End: 2019-12-17 | Stop reason: HOSPADM

## 2019-12-14 RX ORDER — LORAZEPAM 1 MG/1
4 TABLET ORAL
Status: DISCONTINUED | OUTPATIENT
Start: 2019-12-14 | End: 2019-12-17 | Stop reason: HOSPADM

## 2019-12-14 RX ORDER — FOLIC ACID 1 MG/1
1 TABLET ORAL DAILY
Status: DISCONTINUED | OUTPATIENT
Start: 2019-12-14 | End: 2019-12-17 | Stop reason: HOSPADM

## 2019-12-14 RX ORDER — FLUTICASONE PROPIONATE 50 MCG
1 SPRAY, SUSPENSION (ML) NASAL DAILY
Status: DISCONTINUED | OUTPATIENT
Start: 2019-12-14 | End: 2019-12-17 | Stop reason: HOSPADM

## 2019-12-14 RX ORDER — MAGNESIUM SULFATE 1 G/100ML
1 INJECTION INTRAVENOUS PRN
Status: DISCONTINUED | OUTPATIENT
Start: 2019-12-14 | End: 2019-12-17 | Stop reason: HOSPADM

## 2019-12-14 RX ORDER — MAGNESIUM SULFATE 1 G/100ML
2 INJECTION INTRAVENOUS ONCE
Status: COMPLETED | OUTPATIENT
Start: 2019-12-14 | End: 2019-12-14

## 2019-12-14 RX ORDER — ESCITALOPRAM OXALATE 10 MG/1
20 TABLET ORAL DAILY
Status: DISCONTINUED | OUTPATIENT
Start: 2019-12-14 | End: 2019-12-17 | Stop reason: HOSPADM

## 2019-12-14 RX ORDER — BUSPIRONE HYDROCHLORIDE 10 MG/1
10 TABLET ORAL 3 TIMES DAILY
Status: DISCONTINUED | OUTPATIENT
Start: 2019-12-14 | End: 2019-12-17 | Stop reason: HOSPADM

## 2019-12-14 RX ORDER — POTASSIUM CHLORIDE 20 MEQ/1
40 TABLET, EXTENDED RELEASE ORAL PRN
Status: DISCONTINUED | OUTPATIENT
Start: 2019-12-14 | End: 2019-12-17 | Stop reason: HOSPADM

## 2019-12-14 RX ORDER — MAGNESIUM SULFATE 1 G/100ML
1 INJECTION INTRAVENOUS ONCE
Status: COMPLETED | OUTPATIENT
Start: 2019-12-14 | End: 2019-12-14

## 2019-12-14 RX ORDER — ACETAMINOPHEN 325 MG/1
650 TABLET ORAL EVERY 4 HOURS PRN
Status: DISCONTINUED | OUTPATIENT
Start: 2019-12-14 | End: 2019-12-17 | Stop reason: HOSPADM

## 2019-12-14 RX ORDER — GABAPENTIN 100 MG/1
100 CAPSULE ORAL 3 TIMES DAILY
Status: DISCONTINUED | OUTPATIENT
Start: 2019-12-14 | End: 2019-12-17 | Stop reason: HOSPADM

## 2019-12-14 RX ORDER — LORAZEPAM 1 MG/1
2 TABLET ORAL
Status: DISCONTINUED | OUTPATIENT
Start: 2019-12-14 | End: 2019-12-17 | Stop reason: HOSPADM

## 2019-12-14 RX ORDER — ACAMPROSATE CALCIUM 333 MG/1
666 TABLET, DELAYED RELEASE ORAL 3 TIMES DAILY
Status: DISCONTINUED | OUTPATIENT
Start: 2019-12-14 | End: 2019-12-17 | Stop reason: HOSPADM

## 2019-12-14 RX ORDER — ONDANSETRON 2 MG/ML
4 INJECTION INTRAMUSCULAR; INTRAVENOUS EVERY 6 HOURS PRN
Status: DISCONTINUED | OUTPATIENT
Start: 2019-12-14 | End: 2019-12-17 | Stop reason: HOSPADM

## 2019-12-14 RX ADMIN — FOLIC ACID 1 MG: 1 TABLET ORAL at 08:35

## 2019-12-14 RX ADMIN — SODIUM CHLORIDE: 9 INJECTION, SOLUTION INTRAVENOUS at 03:34

## 2019-12-14 RX ADMIN — MAGNESIUM SULFATE HEPTAHYDRATE 2 G: 1 INJECTION, SOLUTION INTRAVENOUS at 00:41

## 2019-12-14 RX ADMIN — GABAPENTIN 100 MG: 100 CAPSULE ORAL at 13:23

## 2019-12-14 RX ADMIN — SODIUM CHLORIDE 500 ML: 9 INJECTION, SOLUTION INTRAVENOUS at 15:53

## 2019-12-14 RX ADMIN — TRAZODONE HYDROCHLORIDE 50 MG: 50 TABLET ORAL at 20:40

## 2019-12-14 RX ADMIN — MAGNESIUM SULFATE HEPTAHYDRATE 1 G: 1 INJECTION, SOLUTION INTRAVENOUS at 12:56

## 2019-12-14 RX ADMIN — POTASSIUM CHLORIDE 40 MEQ: 1500 TABLET, EXTENDED RELEASE ORAL at 08:35

## 2019-12-14 RX ADMIN — LORAZEPAM 3 MG: 1 TABLET ORAL at 20:41

## 2019-12-14 RX ADMIN — Medication 100 MG: at 08:35

## 2019-12-14 RX ADMIN — ERGOCALCIFEROL 50000 UNITS: 1.25 CAPSULE ORAL at 08:35

## 2019-12-14 RX ADMIN — GABAPENTIN 100 MG: 100 CAPSULE ORAL at 08:35

## 2019-12-14 RX ADMIN — BUSPIRONE HYDROCHLORIDE 10 MG: 10 TABLET ORAL at 20:40

## 2019-12-14 RX ADMIN — LORAZEPAM 3 MG: 2 INJECTION INTRAMUSCULAR; INTRAVENOUS at 08:35

## 2019-12-14 RX ADMIN — ACAMPROSATE CALCIUM 666 MG: 333 TABLET, DELAYED RELEASE ORAL at 20:40

## 2019-12-14 RX ADMIN — ACAMPROSATE CALCIUM 666 MG: 333 TABLET, DELAYED RELEASE ORAL at 08:35

## 2019-12-14 RX ADMIN — ACAMPROSATE CALCIUM 666 MG: 333 TABLET, DELAYED RELEASE ORAL at 13:23

## 2019-12-14 RX ADMIN — BUSPIRONE HYDROCHLORIDE 10 MG: 10 TABLET ORAL at 08:35

## 2019-12-14 RX ADMIN — AMLODIPINE BESYLATE 5 MG: 5 TABLET ORAL at 08:35

## 2019-12-14 RX ADMIN — LORAZEPAM 4 MG: 2 INJECTION INTRAMUSCULAR; INTRAVENOUS at 10:02

## 2019-12-14 RX ADMIN — ESCITALOPRAM OXALATE 20 MG: 10 TABLET ORAL at 08:35

## 2019-12-14 RX ADMIN — POTASSIUM CHLORIDE, DEXTROSE MONOHYDRATE AND SODIUM CHLORIDE: 150; 5; 450 INJECTION, SOLUTION INTRAVENOUS at 12:54

## 2019-12-14 RX ADMIN — BUSPIRONE HYDROCHLORIDE 10 MG: 10 TABLET ORAL at 13:23

## 2019-12-14 ASSESSMENT — PAIN DESCRIPTION - PAIN TYPE
TYPE: CHRONIC PAIN
TYPE: CHRONIC PAIN
TYPE: ACUTE PAIN

## 2019-12-14 ASSESSMENT — PAIN DESCRIPTION - LOCATION
LOCATION: GENERALIZED
LOCATION: RIB CAGE
LOCATION: GENERALIZED

## 2019-12-14 ASSESSMENT — PAIN SCALES - GENERAL
PAINLEVEL_OUTOF10: 6
PAINLEVEL_OUTOF10: 4
PAINLEVEL_OUTOF10: 0
PAINLEVEL_OUTOF10: 4

## 2019-12-14 NOTE — CARE COORDINATION
Case Management Initial Discharge Plan  Maxine Blum,             Met with:patient to discuss discharge plans. Information verified: address, contacts, phone number, , insurance Yes  PCP: Jade Lozoya MD  Date of last visit: within the past 2 months    Insurance Provider: Generic states does not have [address] any longer. Called ismael Myers to discuss    Discharge Planning    Living Arrangements:  Alone   Support Systems:  Family Members    Home has 1 stories  2 stairs to climb to get into front door, n/a stairs to climb to reach second floor  Location of bedroom/bathroom in home main    Patient able to perform ADL's:Independent    Current Services (outpatient & in home) none  DME equipment: none  DME provider: n/a      Potential Assistance Needed:  N/A    Patient agreeable to home care: Yes  Freedom of choice provided:  yes    Prior SNF/Rehab Placement and Facility: none  Agreeable to SNF/Rehab: No  [place] of choice provided: n/a   Evaluation: n/a    Expected Discharge date:  19  Patient expects to be discharged to:  home  Follow Up Appointment: Best Day/ Time: Monday PM    Transportation provider: mom  Transportation arrangements needed for discharge: No    Readmission Risk              Risk of Unplanned Readmission:        15             Does patient have a readmission risk score greater than 14?: Yes  If yes, follow-up appointment must be made within 7 days of discharge. Goals of Care: Improve mentation      Discharge Plan: Will need SNF list, has [place] list. Lives with mom. Needs to apply for medicaid. No longer has [employer] insurance. Will need PCP appt.            Electronically signed by Samira Pitts RN on 19 at 2:40 PM

## 2019-12-14 NOTE — PROGRESS NOTES
[address]  Occupational Therapy Not Seen Note     Patient not available for Occupational Therapy due to:     [] Testing:     [] Hemodialysis     [] Blood Transfusion in Progress     []Refusal by Patient:      [] Surgery/Procedure:     [] Strict Bedrest     [] Sedation     [] Spine Precautions      [] Pt being transferred to palliative care at this time. Spoke with pt/family and OT services to be defered.     [] Pt independent with functional mobility and functional tasks.  Pt with no OT acute care needs at this time, will defer OT eval.     [x] Other- cx per RN, not appropriate for OT eval this date    Next Scheduled Treatment: 12/15/19     Signature: Esau Amanda OTR/L
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
## 2019-12-14 NOTE — PLAN OF CARE
Patient remained free from falls this shift. Bed locked and in lowest position, call light within reach, and bed alarm on.
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
## 2019-12-14 NOTE — ED PROVIDER NOTES
Care assumed from Dr. Kimi Henriquez,     Pt with seizure like activity and LOC with ETOH on board. Pt was having word finding difficulty, no focal deficits. Plan for admission with concern for first time seizure.       Jaspal Batres MD  12/14/19 0097
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
## 2019-12-14 NOTE — PLAN OF CARE
Problem: Falls - Risk of:  Goal: Will remain free from falls  Description  Will remain free from falls  12/14/2019 0432 by Noreen Ellsworth RN  Outcome: Ongoing  12/14/2019 0430 by Noreen Ellsworth RN  Outcome: Ongoing     Problem: Pain:  Goal: Control of acute pain  Description  Control of acute pain  12/14/2019 0432 by Noreen Ellsworth RN  Outcome: Ongoing  12/14/2019 0430 by Noreen Ellsworth RN  Outcome: Ongoing

## 2019-12-15 PROBLEM — R55 SYNCOPE AND COLLAPSE: Status: ACTIVE | Noted: 2019-12-15

## 2019-12-15 PROBLEM — E83.42 HYPOMAGNESEMIA: Status: ACTIVE | Noted: 2019-12-15

## 2019-12-15 LAB
ABSOLUTE EOS #: 0.04 K/UL (ref 0–0.44)
ABSOLUTE IMMATURE GRANULOCYTE: 0.11 K/UL (ref 0–0.3)
ABSOLUTE LYMPH #: 2.71 K/UL (ref 1.1–3.7)
ABSOLUTE MONO #: 1.28 K/UL (ref 0.1–1.2)
ANION GAP SERPL CALCULATED.3IONS-SCNC: 15 MMOL/L (ref 9–17)
BASOPHILS # BLD: 0 % (ref 0–2)
BASOPHILS ABSOLUTE: <0.03 K/UL (ref 0–0.2)
BUN BLDV-MCNC: 5 MG/DL (ref 6–20)
BUN/CREAT BLD: ABNORMAL (ref 9–20)
CALCIUM SERPL-MCNC: 9 MG/DL (ref 8.6–10.4)
CHLORIDE BLD-SCNC: 106 MMOL/L (ref 98–107)
CO2: 24 MMOL/L (ref 20–31)
CREAT SERPL-MCNC: 0.38 MG/DL (ref 0.5–0.9)
DIFFERENTIAL TYPE: ABNORMAL
EOSINOPHILS RELATIVE PERCENT: 0 % (ref 1–4)
GFR AFRICAN AMERICAN: >60 ML/MIN
GFR NON-AFRICAN AMERICAN: >60 ML/MIN
GFR SERPL CREATININE-BSD FRML MDRD: ABNORMAL ML/MIN/{1.73_M2}
GFR SERPL CREATININE-BSD FRML MDRD: ABNORMAL ML/MIN/{1.73_M2}
GLUCOSE BLD-MCNC: 127 MG/DL (ref 70–99)
HCT VFR BLD CALC: 34.6 % (ref 36.3–47.1)
HEMOGLOBIN: 10.7 G/DL (ref 11.9–15.1)
IMMATURE GRANULOCYTES: 1 %
LYMPHOCYTES # BLD: 24 % (ref 24–43)
MCH RBC QN AUTO: 32.7 PG (ref 25.2–33.5)
MCHC RBC AUTO-ENTMCNC: 30.9 G/DL (ref 28.4–34.8)
MCV RBC AUTO: 105.8 FL (ref 82.6–102.9)
MONOCYTES # BLD: 11 % (ref 3–12)
NRBC AUTOMATED: 0 PER 100 WBC
PDW BLD-RTO: 17.3 % (ref 11.8–14.4)
PLATELET # BLD: 295 K/UL (ref 138–453)
PLATELET ESTIMATE: ABNORMAL
PMV BLD AUTO: 10.8 FL (ref 8.1–13.5)
POTASSIUM SERPL-SCNC: 4.6 MMOL/L (ref 3.7–5.3)
RBC # BLD: 3.27 M/UL (ref 3.95–5.11)
RBC # BLD: ABNORMAL 10*6/UL
SEG NEUTROPHILS: 64 % (ref 36–65)
SEGMENTED NEUTROPHILS ABSOLUTE COUNT: 7.34 K/UL (ref 1.5–8.1)
SODIUM BLD-SCNC: 145 MMOL/L (ref 135–144)
WBC # BLD: 11.5 K/UL (ref 3.5–11.3)
WBC # BLD: ABNORMAL 10*3/UL

## 2019-12-15 PROCEDURE — 36415 COLL VENOUS BLD VENIPUNCTURE: CPT

## 2019-12-15 PROCEDURE — 6360000002 HC RX W HCPCS: Performed by: NURSE PRACTITIONER

## 2019-12-15 PROCEDURE — 80048 BASIC METABOLIC PNL TOTAL CA: CPT

## 2019-12-15 PROCEDURE — G0378 HOSPITAL OBSERVATION PER HR: HCPCS

## 2019-12-15 PROCEDURE — 2500000003 HC RX 250 WO HCPCS: Performed by: INTERNAL MEDICINE

## 2019-12-15 PROCEDURE — 96372 THER/PROPH/DIAG INJ SC/IM: CPT

## 2019-12-15 PROCEDURE — 99232 SBSQ HOSP IP/OBS MODERATE 35: CPT | Performed by: INTERNAL MEDICINE

## 2019-12-15 PROCEDURE — 96376 TX/PRO/DX INJ SAME DRUG ADON: CPT

## 2019-12-15 PROCEDURE — 96366 THER/PROPH/DIAG IV INF ADDON: CPT

## 2019-12-15 PROCEDURE — 6370000000 HC RX 637 (ALT 250 FOR IP): Performed by: NURSE PRACTITIONER

## 2019-12-15 PROCEDURE — 85025 COMPLETE CBC W/AUTO DIFF WBC: CPT

## 2019-12-15 PROCEDURE — 1200000000 HC SEMI PRIVATE

## 2019-12-15 RX ADMIN — LORAZEPAM 4 MG: 2 INJECTION INTRAMUSCULAR; INTRAVENOUS at 22:12

## 2019-12-15 RX ADMIN — TRAZODONE HYDROCHLORIDE 50 MG: 50 TABLET ORAL at 20:58

## 2019-12-15 RX ADMIN — LORAZEPAM 2 MG: 2 INJECTION INTRAMUSCULAR; INTRAVENOUS at 17:36

## 2019-12-15 RX ADMIN — POTASSIUM CHLORIDE, DEXTROSE MONOHYDRATE AND SODIUM CHLORIDE: 150; 5; 450 INJECTION, SOLUTION INTRAVENOUS at 17:36

## 2019-12-15 RX ADMIN — LORAZEPAM 4 MG: 2 INJECTION INTRAMUSCULAR; INTRAVENOUS at 00:38

## 2019-12-15 RX ADMIN — BUSPIRONE HYDROCHLORIDE 10 MG: 10 TABLET ORAL at 13:02

## 2019-12-15 RX ADMIN — LORAZEPAM 2 MG: 2 INJECTION INTRAMUSCULAR; INTRAVENOUS at 13:01

## 2019-12-15 RX ADMIN — ENOXAPARIN SODIUM 40 MG: 40 INJECTION SUBCUTANEOUS at 09:04

## 2019-12-15 RX ADMIN — LORAZEPAM 4 MG: 2 INJECTION INTRAMUSCULAR; INTRAVENOUS at 04:12

## 2019-12-15 RX ADMIN — ACAMPROSATE CALCIUM 666 MG: 333 TABLET, DELAYED RELEASE ORAL at 09:04

## 2019-12-15 RX ADMIN — GABAPENTIN 100 MG: 100 CAPSULE ORAL at 09:04

## 2019-12-15 RX ADMIN — LORAZEPAM 2 MG: 1 TABLET ORAL at 09:20

## 2019-12-15 RX ADMIN — AMLODIPINE BESYLATE 5 MG: 5 TABLET ORAL at 09:04

## 2019-12-15 RX ADMIN — BUSPIRONE HYDROCHLORIDE 10 MG: 10 TABLET ORAL at 09:04

## 2019-12-15 RX ADMIN — POTASSIUM BICARBONATE 40 MEQ: 782 TABLET, EFFERVESCENT ORAL at 11:18

## 2019-12-15 RX ADMIN — BUSPIRONE HYDROCHLORIDE 10 MG: 10 TABLET ORAL at 20:57

## 2019-12-15 RX ADMIN — GABAPENTIN 100 MG: 100 CAPSULE ORAL at 13:02

## 2019-12-15 RX ADMIN — FOLIC ACID 1 MG: 1 TABLET ORAL at 09:04

## 2019-12-15 RX ADMIN — FLUTICASONE PROPIONATE 1 SPRAY: 50 SPRAY, METERED NASAL at 09:16

## 2019-12-15 RX ADMIN — Medication 100 MG: at 09:04

## 2019-12-15 RX ADMIN — ESCITALOPRAM OXALATE 20 MG: 10 TABLET ORAL at 09:04

## 2019-12-15 RX ADMIN — GABAPENTIN 100 MG: 100 CAPSULE ORAL at 20:57

## 2019-12-15 RX ADMIN — ACAMPROSATE CALCIUM 666 MG: 333 TABLET, DELAYED RELEASE ORAL at 20:58

## 2019-12-15 RX ADMIN — POTASSIUM CHLORIDE, DEXTROSE MONOHYDRATE AND SODIUM CHLORIDE: 150; 5; 450 INJECTION, SOLUTION INTRAVENOUS at 04:06

## 2019-12-15 RX ADMIN — ACAMPROSATE CALCIUM 666 MG: 333 TABLET, DELAYED RELEASE ORAL at 12:59

## 2019-12-15 ASSESSMENT — PAIN DESCRIPTION - LOCATION
LOCATION: CHEST
LOCATION: CHEST

## 2019-12-15 ASSESSMENT — PAIN DESCRIPTION - PAIN TYPE: TYPE: CHRONIC PAIN

## 2019-12-15 ASSESSMENT — PAIN DESCRIPTION - FREQUENCY
FREQUENCY: INTERMITTENT
FREQUENCY: INTERMITTENT

## 2019-12-15 ASSESSMENT — PAIN SCALES - GENERAL
PAINLEVEL_OUTOF10: 4
PAINLEVEL_OUTOF10: 2
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
## 2019-12-15 NOTE — PLAN OF CARE
Problem: Falls - Risk of:  Goal: Will remain free from falls  Description  Will remain free from falls  12/15/2019 0628 by Fabian Hairston RN  Outcome: Met This Shift     Problem: Falls - Risk of:  Goal: Absence of physical injury  Description  Absence of physical injury  Outcome: Met This Shift     Problem: Pain:  Goal: Pain level will decrease  Description  Pain level will decrease  Outcome: Met This Shift     Problem: Pain:  Goal: Control of acute pain  Description  Control of acute pain  Outcome: Met This Shift

## 2019-12-15 NOTE — PLAN OF CARE
Problem: Falls - Risk of:  Goal: Will remain free from falls  Description  Will remain free from falls  12/15/2019 1704 by Lisa Coughlin RN  Outcome: Ongoing  12/15/2019 0628 by Magda Hassan RN  Outcome: Met This Shift  Goal: Absence of physical injury  Description  Absence of physical injury  12/15/2019 1704 by Lisa Coughlin RN  Outcome: Ongoing  12/15/2019 0628 by Magda Hassan RN  Outcome: Met This Shift     Problem: Pain:  Goal: Pain level will decrease  Description  Pain level will decrease  12/15/2019 1704 by Lisa oCughlin RN  Outcome: Ongoing  12/15/2019 0628 by Magda Hassan RN  Outcome: Met This Shift  Goal: Control of acute pain  Description  Control of acute pain  12/15/2019 1704 by Lisa Coughlin RN  Outcome: Ongoing  12/15/2019 0628 by Magda Hassan RN  Outcome: Met This Shift  Goal: Control of chronic pain  Description  Control of chronic pain  Outcome: Ongoing

## 2019-12-16 PROBLEM — J06.0 ACUTE LARYNGOPHARYNGITIS: Status: ACTIVE | Noted: 2019-12-16

## 2019-12-16 PROBLEM — J06.9 VIRAL UPPER RESPIRATORY TRACT INFECTION: Status: ACTIVE | Noted: 2019-12-16

## 2019-12-16 PROCEDURE — 97166 OT EVAL MOD COMPLEX 45 MIN: CPT

## 2019-12-16 PROCEDURE — 97530 THERAPEUTIC ACTIVITIES: CPT

## 2019-12-16 PROCEDURE — 6370000000 HC RX 637 (ALT 250 FOR IP): Performed by: INTERNAL MEDICINE

## 2019-12-16 PROCEDURE — 6370000000 HC RX 637 (ALT 250 FOR IP): Performed by: NURSE PRACTITIONER

## 2019-12-16 PROCEDURE — G0378 HOSPITAL OBSERVATION PER HR: HCPCS

## 2019-12-16 PROCEDURE — 99232 SBSQ HOSP IP/OBS MODERATE 35: CPT | Performed by: INTERNAL MEDICINE

## 2019-12-16 PROCEDURE — 97116 GAIT TRAINING THERAPY: CPT

## 2019-12-16 PROCEDURE — 97535 SELF CARE MNGMENT TRAINING: CPT

## 2019-12-16 PROCEDURE — 96372 THER/PROPH/DIAG INJ SC/IM: CPT

## 2019-12-16 PROCEDURE — 1200000000 HC SEMI PRIVATE

## 2019-12-16 PROCEDURE — 6360000002 HC RX W HCPCS: Performed by: NURSE PRACTITIONER

## 2019-12-16 PROCEDURE — 96366 THER/PROPH/DIAG IV INF ADDON: CPT

## 2019-12-16 RX ORDER — BENZONATATE 100 MG/1
100 CAPSULE ORAL 3 TIMES DAILY PRN
Qty: 20 CAPSULE | Refills: 0 | Status: SHIPPED | OUTPATIENT
Start: 2019-12-16 | End: 2019-12-23

## 2019-12-16 RX ORDER — GUAIFENESIN 600 MG/1
600 TABLET, EXTENDED RELEASE ORAL 2 TIMES DAILY
Status: DISCONTINUED | OUTPATIENT
Start: 2019-12-16 | End: 2019-12-17 | Stop reason: HOSPADM

## 2019-12-16 RX ORDER — NICOTINE 21 MG/24HR
1 PATCH, TRANSDERMAL 24 HOURS TRANSDERMAL DAILY PRN
Qty: 30 PATCH | Refills: 3 | Status: SHIPPED | OUTPATIENT
Start: 2019-12-16 | End: 2020-06-11 | Stop reason: ALTCHOICE

## 2019-12-16 RX ORDER — BENZONATATE 100 MG/1
100 CAPSULE ORAL 3 TIMES DAILY PRN
Status: DISCONTINUED | OUTPATIENT
Start: 2019-12-16 | End: 2019-12-17 | Stop reason: HOSPADM

## 2019-12-16 RX ORDER — GUAIFENESIN 600 MG/1
600 TABLET, EXTENDED RELEASE ORAL 2 TIMES DAILY
Qty: 14 TABLET | Refills: 0 | Status: SHIPPED | OUTPATIENT
Start: 2019-12-16 | End: 2020-06-11 | Stop reason: ALTCHOICE

## 2019-12-16 RX ADMIN — BENZONATATE 100 MG: 100 CAPSULE ORAL at 20:24

## 2019-12-16 RX ADMIN — FLUTICASONE PROPIONATE 1 SPRAY: 50 SPRAY, METERED NASAL at 08:10

## 2019-12-16 RX ADMIN — ESCITALOPRAM OXALATE 20 MG: 10 TABLET ORAL at 08:09

## 2019-12-16 RX ADMIN — BUSPIRONE HYDROCHLORIDE 10 MG: 10 TABLET ORAL at 12:34

## 2019-12-16 RX ADMIN — Medication 100 MG: at 08:08

## 2019-12-16 RX ADMIN — ENOXAPARIN SODIUM 40 MG: 40 INJECTION SUBCUTANEOUS at 08:09

## 2019-12-16 RX ADMIN — AMLODIPINE BESYLATE 5 MG: 5 TABLET ORAL at 08:11

## 2019-12-16 RX ADMIN — GABAPENTIN 100 MG: 100 CAPSULE ORAL at 20:23

## 2019-12-16 RX ADMIN — LORAZEPAM 2 MG: 1 TABLET ORAL at 20:25

## 2019-12-16 RX ADMIN — GABAPENTIN 100 MG: 100 CAPSULE ORAL at 12:35

## 2019-12-16 RX ADMIN — FOLIC ACID 1 MG: 1 TABLET ORAL at 08:09

## 2019-12-16 RX ADMIN — LORAZEPAM 2 MG: 1 TABLET ORAL at 12:31

## 2019-12-16 RX ADMIN — BENZONATATE 100 MG: 100 CAPSULE ORAL at 13:23

## 2019-12-16 RX ADMIN — GUAIFENESIN 600 MG: 600 TABLET, EXTENDED RELEASE ORAL at 18:23

## 2019-12-16 RX ADMIN — ACAMPROSATE CALCIUM 666 MG: 333 TABLET, DELAYED RELEASE ORAL at 20:24

## 2019-12-16 RX ADMIN — BUSPIRONE HYDROCHLORIDE 10 MG: 10 TABLET ORAL at 08:09

## 2019-12-16 RX ADMIN — LORAZEPAM 2 MG: 1 TABLET ORAL at 08:03

## 2019-12-16 RX ADMIN — ACAMPROSATE CALCIUM 666 MG: 333 TABLET, DELAYED RELEASE ORAL at 12:35

## 2019-12-16 RX ADMIN — GABAPENTIN 100 MG: 100 CAPSULE ORAL at 08:08

## 2019-12-16 RX ADMIN — ACAMPROSATE CALCIUM 666 MG: 333 TABLET, DELAYED RELEASE ORAL at 08:09

## 2019-12-16 RX ADMIN — TRAZODONE HYDROCHLORIDE 50 MG: 50 TABLET ORAL at 20:23

## 2019-12-16 RX ADMIN — BUSPIRONE HYDROCHLORIDE 10 MG: 10 TABLET ORAL at 22:30

## 2019-12-16 ASSESSMENT — PAIN SCALES - GENERAL
PAINLEVEL_OUTOF10: 7
PAINLEVEL_OUTOF10: 6
PAINLEVEL_OUTOF10: 8

## 2019-12-16 ASSESSMENT — ENCOUNTER SYMPTOMS
VOMITING: 0
SHORTNESS OF BREATH: 0
NAUSEA: 0
COUGH: 1
ABDOMINAL PAIN: 0

## 2019-12-16 ASSESSMENT — PAIN DESCRIPTION - PAIN TYPE: TYPE: CHRONIC PAIN

## 2019-12-16 ASSESSMENT — PAIN DESCRIPTION - LOCATION
LOCATION: CHEST
LOCATION: CHEST
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
## 2019-12-16 NOTE — CARE COORDINATION
Per Markell Wilkins RN patient choices are:    [facility] rehab  [place] [facility] [address] [facility] of [address] Message left for Emil to check status of precert and if PMR will be evaluating    1623 spoke with Emil, Dr Amol Lubin will be in to see tomorrow    1626 Referral to [place] of [facility]
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
## 2019-12-16 NOTE — PROGRESS NOTES
Smoking Cessation - topics covered   []  Health Risks  []  Benefits of Quitting   []  Smoking Cessation  []  Patient has no history of tobacco use  []  Patient is former smoker. []  No need for tobacco cessation education. [x]  Booklet given  [x]  Patient verbalizes understanding. [x]  Patient denies need for tobacco cessation education but accepted booklet. []  Unable to meet with patient today. Will follow up as able.   Rosa Elena Pearce  2:29 PM
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
## 2019-12-17 ENCOUNTER — TELEPHONE (OUTPATIENT)
Dept: FAMILY MEDICINE CLINIC | Age: 49
End: 2019-12-17

## 2019-12-17 VITALS
HEART RATE: 99 BPM | WEIGHT: 133.25 LBS | BODY MASS INDEX: 22.2 KG/M2 | TEMPERATURE: 98.2 F | HEIGHT: 65 IN | DIASTOLIC BLOOD PRESSURE: 94 MMHG | SYSTOLIC BLOOD PRESSURE: 132 MMHG | RESPIRATION RATE: 16 BRPM | OXYGEN SATURATION: 94 %

## 2019-12-17 PROBLEM — R26.2 AMBULATORY DYSFUNCTION: Status: ACTIVE | Noted: 2019-12-17

## 2019-12-17 LAB
-: ABNORMAL
AMORPHOUS: ABNORMAL
BACTERIA: ABNORMAL
BILIRUBIN URINE: ABNORMAL
CASTS UA: ABNORMAL /LPF (ref 0–2)
COLOR: ABNORMAL
COMMENT UA: ABNORMAL
CRYSTALS, UA: ABNORMAL /HPF
CRYSTALS, UA: ABNORMAL /HPF
EPITHELIAL CELLS UA: ABNORMAL /HPF (ref 0–5)
GLUCOSE URINE: NEGATIVE
KETONES, URINE: ABNORMAL
LEUKOCYTE ESTERASE, URINE: NEGATIVE
LV EF: 43 %
LVEF MODALITY: NORMAL
MUCUS: ABNORMAL
NITRITE, URINE: NEGATIVE
OTHER OBSERVATIONS UA: ABNORMAL
PH UA: 7.5 (ref 5–8)
PROTEIN UA: NEGATIVE
RBC UA: ABNORMAL /HPF (ref 0–2)
RENAL EPITHELIAL, UA: ABNORMAL /HPF
SPECIFIC GRAVITY UA: 1.03 (ref 1–1.03)
TRICHOMONAS: ABNORMAL
TURBIDITY: CLEAR
URINE HGB: NEGATIVE
UROBILINOGEN, URINE: ABNORMAL
WBC UA: ABNORMAL /HPF (ref 0–5)
YEAST: ABNORMAL

## 2019-12-17 PROCEDURE — 97110 THERAPEUTIC EXERCISES: CPT

## 2019-12-17 PROCEDURE — 81001 URINALYSIS AUTO W/SCOPE: CPT

## 2019-12-17 PROCEDURE — G0378 HOSPITAL OBSERVATION PER HR: HCPCS

## 2019-12-17 PROCEDURE — 6360000002 HC RX W HCPCS: Performed by: NURSE PRACTITIONER

## 2019-12-17 PROCEDURE — 97116 GAIT TRAINING THERAPY: CPT

## 2019-12-17 PROCEDURE — 96372 THER/PROPH/DIAG INJ SC/IM: CPT

## 2019-12-17 PROCEDURE — 93306 TTE W/DOPPLER COMPLETE: CPT

## 2019-12-17 PROCEDURE — 99239 HOSP IP/OBS DSCHRG MGMT >30: CPT | Performed by: INTERNAL MEDICINE

## 2019-12-17 PROCEDURE — 6370000000 HC RX 637 (ALT 250 FOR IP): Performed by: NURSE PRACTITIONER

## 2019-12-17 PROCEDURE — 6370000000 HC RX 637 (ALT 250 FOR IP): Performed by: INTERNAL MEDICINE

## 2019-12-17 RX ORDER — LORAZEPAM 0.5 MG/1
0.5 TABLET ORAL EVERY 8 HOURS PRN
Qty: 12 TABLET | Refills: 0 | Status: SHIPPED | OUTPATIENT
Start: 2019-12-17 | End: 2020-01-20 | Stop reason: SDUPTHER

## 2019-12-17 RX ORDER — AZITHROMYCIN 250 MG/1
500 TABLET, FILM COATED ORAL DAILY
Status: DISCONTINUED | OUTPATIENT
Start: 2019-12-17 | End: 2019-12-17 | Stop reason: HOSPADM

## 2019-12-17 RX ORDER — AZITHROMYCIN 500 MG/1
500 TABLET, FILM COATED ORAL DAILY
Qty: 3 TABLET | Refills: 0 | Status: SHIPPED | OUTPATIENT
Start: 2019-12-17 | End: 2019-12-20

## 2019-12-17 RX ADMIN — GUAIFENESIN 600 MG: 600 TABLET, EXTENDED RELEASE ORAL at 09:55

## 2019-12-17 RX ADMIN — AMLODIPINE BESYLATE 5 MG: 5 TABLET ORAL at 09:55

## 2019-12-17 RX ADMIN — AZITHROMYCIN 500 MG: 250 TABLET, FILM COATED ORAL at 14:20

## 2019-12-17 RX ADMIN — GABAPENTIN 100 MG: 100 CAPSULE ORAL at 13:26

## 2019-12-17 RX ADMIN — BENZONATATE 100 MG: 100 CAPSULE ORAL at 13:26

## 2019-12-17 RX ADMIN — BUSPIRONE HYDROCHLORIDE 10 MG: 10 TABLET ORAL at 09:55

## 2019-12-17 RX ADMIN — GABAPENTIN 100 MG: 100 CAPSULE ORAL at 09:55

## 2019-12-17 RX ADMIN — ACAMPROSATE CALCIUM 666 MG: 333 TABLET, DELAYED RELEASE ORAL at 13:26

## 2019-12-17 RX ADMIN — LORAZEPAM 2 MG: 1 TABLET ORAL at 17:11

## 2019-12-17 RX ADMIN — LORAZEPAM 2 MG: 1 TABLET ORAL at 09:51

## 2019-12-17 RX ADMIN — ENOXAPARIN SODIUM 40 MG: 40 INJECTION SUBCUTANEOUS at 09:57

## 2019-12-17 RX ADMIN — BENZONATATE 100 MG: 100 CAPSULE ORAL at 09:56

## 2019-12-17 RX ADMIN — LORAZEPAM 2 MG: 1 TABLET ORAL at 13:26

## 2019-12-17 RX ADMIN — Medication 100 MG: at 09:54

## 2019-12-17 RX ADMIN — FLUTICASONE PROPIONATE 1 SPRAY: 50 SPRAY, METERED NASAL at 09:54

## 2019-12-17 RX ADMIN — BUSPIRONE HYDROCHLORIDE 10 MG: 10 TABLET ORAL at 14:15

## 2019-12-17 RX ADMIN — ACAMPROSATE CALCIUM 666 MG: 333 TABLET, DELAYED RELEASE ORAL at 09:55

## 2019-12-17 RX ADMIN — ESCITALOPRAM OXALATE 20 MG: 10 TABLET ORAL at 09:55

## 2019-12-17 RX ADMIN — ACETAMINOPHEN 650 MG: 325 TABLET ORAL at 13:26

## 2019-12-17 RX ADMIN — FOLIC ACID 1 MG: 1 TABLET ORAL at 09:55

## 2019-12-17 ASSESSMENT — PAIN SCALES - GENERAL
PAINLEVEL_OUTOF10: 0
PAINLEVEL_OUTOF10: 0
PAINLEVEL_OUTOF10: 7
PAINLEVEL_OUTOF10: 10

## 2019-12-17 ASSESSMENT — PAIN DESCRIPTION - LOCATION: LOCATION: RIB CAGE

## 2019-12-17 ASSESSMENT — ENCOUNTER SYMPTOMS
NAUSEA: 0
SHORTNESS OF BREATH: 0
ABDOMINAL PAIN: 0
VOMITING: 0
COUGH: 1

## 2019-12-17 ASSESSMENT — PAIN DESCRIPTION - PAIN TYPE: TYPE: CHRONIC PAIN

## 2019-12-17 NOTE — CARE COORDINATION
Patient observed walking in garcia with AD and therapist. Per notes patient is higher fx and ambulating 200 ft with walker. Patient feels that she is able to safely transition home with walker. HC offer and freedom of choice provided. Request HCS for walker and [facility].  Orders requested for [facility] [employer] [facility]. and walker
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
## 2019-12-17 NOTE — CONSULTS
Consult noted. Patient does not have a qualifying diagnosis for acute inpatient rehabilitation. She is also fairly high functioning and ambulating 200 feet which does not meet criteria for acute rehab. She has been referred to [facility] and I would agree with this disposition plan. If her functional level declines please let us know I would be happy to reevaluate.
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
## 2019-12-18 ENCOUNTER — CARE COORDINATION (OUTPATIENT)
Dept: CASE MANAGEMENT | Age: 49
End: 2019-12-18

## 2019-12-18 ENCOUNTER — TELEPHONE (OUTPATIENT)
Dept: ADMINISTRATIVE | Age: 49
End: 2019-12-18

## 2019-12-18 DIAGNOSIS — J20.9 ACUTE BRONCHITIS, UNSPECIFIED ORGANISM: Primary | ICD-10-CM

## 2019-12-23 ENCOUNTER — OFFICE VISIT (OUTPATIENT)
Dept: FAMILY MEDICINE CLINIC | Age: 49
End: 2019-12-23
Payer: COMMERCIAL

## 2019-12-23 VITALS
SYSTOLIC BLOOD PRESSURE: 100 MMHG | WEIGHT: 127 LBS | OXYGEN SATURATION: 94 % | HEIGHT: 65 IN | DIASTOLIC BLOOD PRESSURE: 70 MMHG | HEART RATE: 99 BPM | BODY MASS INDEX: 21.16 KG/M2

## 2019-12-23 DIAGNOSIS — B96.89 ACUTE BACTERIAL SINUSITIS: ICD-10-CM

## 2019-12-23 DIAGNOSIS — F33.1 MODERATE EPISODE OF RECURRENT MAJOR DEPRESSIVE DISORDER (HCC): ICD-10-CM

## 2019-12-23 DIAGNOSIS — G62.1 ALCOHOLIC PERIPHERAL NEUROPATHY (HCC): Primary | ICD-10-CM

## 2019-12-23 DIAGNOSIS — K12.0 CANKER SORE: ICD-10-CM
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
## 2019-12-23 DIAGNOSIS — J01.90 ACUTE BACTERIAL SINUSITIS: ICD-10-CM

## 2019-12-23 DIAGNOSIS — R56.9 SEIZURES (HCC): ICD-10-CM

## 2019-12-23 DIAGNOSIS — F41.9 ANXIETY: ICD-10-CM

## 2019-12-23 DIAGNOSIS — F10.931 ALCOHOL WITHDRAWAL SYNDROME, WITH DELIRIUM (HCC): ICD-10-CM

## 2019-12-23 DIAGNOSIS — M79.2 NEUROPATHIC PAIN, LEG, LEFT: ICD-10-CM

## 2019-12-23 PROBLEM — J06.9 VIRAL UPPER RESPIRATORY TRACT INFECTION: Status: RESOLVED | Noted: 2019-12-16 | Resolved: 2019-12-23

## 2019-12-23 PROCEDURE — 99496 TRANSJ CARE MGMT HIGH F2F 7D: CPT | Performed by: INTERNAL MEDICINE

## 2019-12-23 PROCEDURE — 1111F DSCHRG MED/CURRENT MED MERGE: CPT | Performed by: INTERNAL MEDICINE

## 2019-12-23 RX ORDER — GABAPENTIN 100 MG/1
200 CAPSULE ORAL 3 TIMES DAILY
Qty: 270 CAPSULE | Refills: 0 | Status: SHIPPED
Start: 2019-12-23 | End: 2020-01-14 | Stop reason: SDUPTHER

## 2019-12-23 RX ORDER — AMOXICILLIN 875 MG/1
875 TABLET, COATED ORAL 2 TIMES DAILY
Qty: 14 TABLET | Refills: 0 | Status: SHIPPED | OUTPATIENT
Start: 2019-12-23 | End: 2019-12-30

## 2020-01-02 ENCOUNTER — HOSPITAL ENCOUNTER (OUTPATIENT)
Dept: NEUROLOGY | Age: 50
Discharge: HOME | End: 2020-01-02
Payer: MEDICAID

## 2020-01-02 PROCEDURE — 95819 EEG AWAKE AND ASLEEP: CPT | Performed by: PSYCHIATRY & NEUROLOGY

## 2020-01-02 PROCEDURE — 95816 EEG AWAKE AND DROWSY: CPT

## 2020-01-10 ENCOUNTER — HOSPITAL ENCOUNTER (EMERGENCY)
Age: 50
Discharge: HOME | End: 2020-01-11
Attending: EMERGENCY MEDICINE
Payer: COMMERCIAL

## 2020-01-10 PROCEDURE — 99285 EMERGENCY DEPT VISIT HI MDM: CPT

## 2020-01-10 ASSESSMENT — ENCOUNTER SYMPTOMS
NAUSEA: 0
SHORTNESS OF BREATH: 0
VOMITING: 0
BACK PAIN: 0
COUGH: 0
ABDOMINAL PAIN: 0

## 2020-01-11 VITALS
OXYGEN SATURATION: 95 % | RESPIRATION RATE: 14 BRPM | DIASTOLIC BLOOD PRESSURE: 84 MMHG | HEART RATE: 99 BPM | SYSTOLIC BLOOD PRESSURE: 130 MMHG | TEMPERATURE: 97.3 F

## 2020-01-11 LAB
ETHANOL PERCENT: 0.32 %
ETHANOL: 322 MG/DL

## 2020-01-11 PROCEDURE — G0480 DRUG TEST DEF 1-7 CLASSES: HCPCS

## 2020-01-11 RX ORDER — CHLORDIAZEPOXIDE HYDROCHLORIDE 25 MG/1
50 CAPSULE, GELATIN COATED ORAL 3 TIMES DAILY PRN
Qty: 18 CAPSULE | Refills: 0 | Status: SHIPPED | OUTPATIENT
Start: 2020-01-11 | End: 2020-01-14

## 2020-01-11 RX ORDER — CHLORDIAZEPOXIDE HYDROCHLORIDE 25 MG/1
25 CAPSULE, GELATIN COATED ORAL 3 TIMES DAILY PRN
Qty: 4 CAPSULE | Refills: 0 | Status: SHIPPED | OUTPATIENT
Start: 2020-01-11 | End: 2020-01-11 | Stop reason: SDUPTHER

## 2020-01-11 ASSESSMENT — PAIN DESCRIPTION - FREQUENCY: FREQUENCY: INTERMITTENT

## 2020-01-11 ASSESSMENT — PAIN DESCRIPTION - DESCRIPTORS: DESCRIPTORS: ACHING

## 2020-01-11 ASSESSMENT — PAIN SCALES - GENERAL: PAINLEVEL_OUTOF10: 4

## 2020-01-11 ASSESSMENT — PAIN DESCRIPTION - ORIENTATION: ORIENTATION: MID

## 2020-01-11 ASSESSMENT — PAIN DESCRIPTION - ONSET: ONSET: ON-GOING

## 2020-01-11 ASSESSMENT — PAIN DESCRIPTION - LOCATION: LOCATION: CHEST
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
## 2020-01-11 NOTE — ED PROVIDER NOTES
[facility] ED  Emergency Department Encounter  Emergency Medicine Resident     Pt Name: Kyle Bender  MRN: [id number]  [place] 1970  Date of evaluation: 1/10/20  PCP:  Derek Serrato MD    [address] [place] [address]       Chief Complaint   Patient presents with    Other     Pt states she needs to detox from ETOH, last drink was tonight        HISTORY OFPRESENT ILLNESS  (Location/Symptom, Timing/Onset, Context/Setting, Quality, Duration, Modifying Factors,Severity.)      Kyle Bender is a 47 y.o. female who presents with complaints of alcohol abuse and requesting detox. Patient with past medical history of hypertension, alcohol abuse, alcohol withdrawal, hypomagnesemia, seizures. Patient has been through detox multiple times, is accompanied by her mom. Mom and patient states that patient has been out of rehab, however has relapsed and did not follow-up with post detox plan. Patient states he drinks approximately half fifth of rum a day. With last drink being tonight. She called her mom as she stated she was tired of drinking, and wanted help. Patient does admit to some intermittent depression but denies any suicidal homicidal ideations. She denies any drugs. She currently denies any medical complaints including headache, vision changes, chest pain, shortness of breath, seizures, abdominal pain, nausea, vomiting, falls. PAST MEDICAL / SURGICAL / SOCIAL / FAMILY HISTORY      has a past medical history of Closed fracture of lateral portion of left tibial plateau, Hypertension, and Pain, joint, ankle and foot. has a past surgical history that includes Hysterectomy (2003); Brain tumor excision (1989); fracture surgery (Left, 7-3-13); and fracture surgery (Right).      Social History     Socioeconomic History    Marital status: Single     Spouse name: Not on file    Number of children: Not on file    Years of education: Not on file    Highest education level: Not on file resident      PATIENT REFERRED TO:  [facility]  [address] [facility] [address]  [phone]  Go to   If symptoms worsen    Ludivina Gonzales MD  [address] [phone]    In 3 days        DISCHARGE MEDICATIONS:  New Prescriptions    No medications on file       Judit Resendiz DO  Emergency Medicine Resident    (Please note that portions of this note were completed with a voice recognition program.Efforts were made to edit the dictations but occasionally words are mis-transcribed.)        Judit Resendiz DO  Resident  01/11/20 [address] Alphonso Recinos DO  Resident  01/11/20 7066

## 2020-01-11 NOTE — ED NOTES
Pt sitting up on stretcher eating meal tray, NAD noted, respirations even and non labored. Pt denies further needs at this time. Will continue to monitor.       Bessie Archibald RN  01/11/20 6078

## 2020-01-11 NOTE — ED NOTES
[] Barry    [] [facility]    [x]  [address] [place] ASSESSMENT      Y  N     [x] [] In the past two weeks have you had thoughts of hurting yourself in any way? [x] [] In the past two weeks have you had thoughts that you would be better off dead? [] [x] Have you made a suicide attempt in the past two months? [] [x] Do you have a plan for hurting yourself or suicide? [] [x] Presence of hallucinations/voices related to hurting himself or herself or someone else. SUICIDE/SECURITY WATCH PRECAUTION CHECKLIST     Orders    [x]  Suicide/Security Watch Precautions initiated as checked below:   1/11/20 12:50 AM BH31/BH31F    [x] Notified physician:  Fara Nichols MD  1/11/20 12:50 AM    [x] Orders obtained as appropriate:     [x] 1:1 Observer     [] Psych Consult     [] Psych Consult    Name:  Date:  Time:    [x] 1:1 Observer, Notified by:  Kurtis Che Nurse Supervisor    [x] Remove all personal clothes from room and place in snap/paper gown/pants. Slipper only    [x] Remove all personal belongings from room and secured away from patient. Documentation    [x] Initiate Suicide/Security Watch Precaution Flow Sheet    [x] Initiate individualized Care Plan/Problem    [x] Document why precautions initiated on flow sheet (Initiate Nursing Care Plan/Problem)    [x] 1:1 Observer in place; instructions provided. Suicide precautions require observer be within arms length. [x] Nurse-Observer Communication Hand-off initiated by RN, reviewed with Observer. Subsequently used as Hand Off between Observers. [x] Initiate every 15 minute observations per observer as delegated by the RN.     [x] Initiate RN assessment and documentation    Environmental Scan  Search Criteria and Process: OPTIONAL, see Search Policy    [x] Reason for search: Suicidal     [x] Nursing in presence of second person to search patient    [x] Patient notified of reason for body assessment and belongings search:     Persons present during search: Laron Devine   Results of search and disposition: clothes placed in a patient bag and locked away from patient. Searchers Name: [facility], security    These items or items similar should be removed from the room:   [] Chairs   [] Telephone   [x] Trash cans and liners   [x] Plastic utensils (order Patient Safety tray)   [x] Empty or remove Sharps containers   [x] All personal clothing/belongings removed   [x] All unnecessary lead wires, electrical cords, draw cords, etc.   [x] Flowers and plants   [x] Double check for lighters, matches, razors, any glass items etc that can be used as weapons. Person completing Checklist: Laron Devine       GENERAL INFORMATION     Y  N     [x] [] Has the patient been informed that they are on a watch and what that means? [x] [] Can the patient get out of Bed without nursing assistance? [x] [] Can the patient use the restroom without nursing assistance? [x] [] Can the patient walk the halls to [place] their legs? \"   [] [x] Does the patient have metal utensils? [x] [] Have the patient's belongings been placed out of control of the patient? [x] [] Have the patient and his/her belongings been checked for contraband? [] [x] Is the patient under any visitor restrictions? If Yes, explain:   [] [x] Is the patient under an alias? [facility] Name:   Authorized visitors (no more than two are to be on the list)   Name/Relationship:   Name/Relationship:    Name of Staff member that you Received this information from?: Laron Devine    General Description:    Ltanya Feeling BH31/BH31F female 47 y.o. Admission weight:      Race: [x]  [] Black  []   []   [] [place] [] Other  Facial Hair:  [] Yes  [x] No  If yes, please describe: Identifying Marks (i.e. Visible tattoos, scars, etc... ):     NURSING CARE PLAN    Nursing Diagnosis: Risk of Self Directed Harm  [] Actual  [x] Potential  Date Started: 1/11/20      Etiological Factors: (related to)  [x] Expressed or implied suicidal ideation/behavior  [x] Depression  [] Suicide attempt      [x] Low self-esteem  [] Hallucinations      [x] Feeling of Hopelessness  [] Substance abuse or withdrawal    [] Dysfunctional family  [] Major traumatic event, eg., divorce, etc   [] Excessive stress/anxiety    1/11/20    Expected Outcomes    Patient will:   [x] Patient will remain safe for the duration of their stay   [x] Patient's environment will be safe, eg. Free of potential suicide weapons   [] Verbalize Recovery from suicidal episode and improvement in self-worth   [x] Discuss feeling that precipitated suicide attempt/thoughts/behavior   [] Will describe available resources for crisis prevention and management   [] Will verbalize positive coping skills     Nursing Intervention   [x] Assessment and Observations hourly   [x] Suicide Precautions implemented with patient, should be 1:1 observation   [x] Document observation [id number] and RN assessment hourly   [] Consult physician for:    [] Psychiatric consult    [] Pharmacological therapy    [] Other:    [x] Patient search completed by security   [x] Initiated appropriate safety protocols by removing from the patient's environment anything that could be used to inflict self injury, eg. Order safe tray, snap gown, etc   [x] Maintain open, warm, caring, non-judgmental attitude/manner towards patient   [] Discuss advantages and disadvantages of existing coping methods/skills   [x] Assist and educate patient with identifying present strengths and coping skills   [x] Keep patient informed regarding plan of care and provide clear concise explanations. Provide the patient/family education information as well as telephone numbers and other information about crisis centers, hot lines, and counselors.     Discharge Planning:   [] Referral  [] Groups [] Health agencies  [] Other:            Jorden Jefferson, NA  01/11/20 3326

## 2020-01-11 NOTE — ED NOTES
Writer met with patient at bedside to discuss discharge plans. Patient denies suicidal and homicidal ideations and reports that she is linked up with [facility]. Patient reports that she is not sure when she sees her psychiatrist next but does have the appointment in her phone, phone is currently locked up. Patient states that her psychiatrist has been prescribing medications to assist her with cravings, \"I just have a hard time remembering to take my medications. \" She reports that she prefers to continue following care with her psychiatrist for her alcohol use. Writer and patient discussed having her pharmacy put her mediations in bubble packs to better assist with compliance. Writer informed patient of what this was and how to get started. She stated that she would look into this. Patient reported no further needs at this time and her mother will arrive to assist her home.       JOANNE Saucedo  01/11/20 8808

## 2020-01-11 NOTE — ED NOTES
SW met with pt and Family   Discussed with them resources in the community for AOD tx   Family expressed frustration about limits to tx   ASHA to discuss with ED staff      Junious Officer, JOANNE  01/11/20 0010

## 2020-01-14 ENCOUNTER — OFFICE VISIT (OUTPATIENT)
Dept: FAMILY MEDICINE CLINIC | Age: 50
End: 2020-01-14
Payer: MEDICAID

## 2020-01-14 VITALS
HEIGHT: 65 IN | OXYGEN SATURATION: 98 % | DIASTOLIC BLOOD PRESSURE: 82 MMHG | WEIGHT: 129.2 LBS | SYSTOLIC BLOOD PRESSURE: 115 MMHG | BODY MASS INDEX: 21.52 KG/M2 | HEART RATE: 97 BPM

## 2020-01-14 PROCEDURE — 99214 OFFICE O/P EST MOD 30 MIN: CPT | Performed by: INTERNAL MEDICINE

## 2020-01-14 RX ORDER — BENZONATATE 100 MG/1
100 CAPSULE ORAL EVERY 6 HOURS PRN
Qty: 30 CAPSULE | Refills: 1 | Status: SHIPPED | OUTPATIENT
Start: 2020-01-14 | End: 2020-01-24

## 2020-01-14 RX ORDER — DISULFIRAM 500 MG/1
500 TABLET ORAL 2 TIMES DAILY
Qty: 60 TABLET | Refills: 3 | Status: SHIPPED | OUTPATIENT
Start: 2020-01-14 | End: 2020-06-11 | Stop reason: ALTCHOICE

## 2020-01-14 RX ORDER — TRAZODONE HYDROCHLORIDE 100 MG/1
100 TABLET ORAL NIGHTLY
Qty: 30 TABLET | Refills: 3 | Status: SHIPPED | OUTPATIENT
Start: 2020-01-14 | End: 2020-06-11

## 2020-01-14 RX ORDER — GABAPENTIN 100 MG/1
200 CAPSULE ORAL 3 TIMES DAILY
Qty: 180 CAPSULE | Refills: 3 | Status: SHIPPED | OUTPATIENT
Start: 2020-01-14 | End: 2020-06-11 | Stop reason: SDUPTHER
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
## 2020-01-20 RX ORDER — LORAZEPAM 0.5 MG/1
0.5 TABLET ORAL EVERY 8 HOURS PRN
Qty: 12 TABLET | Refills: 0 | Status: SHIPPED | OUTPATIENT
Start: 2020-01-20 | End: 2020-01-24

## 2020-01-27 ENCOUNTER — TELEPHONE (OUTPATIENT)
Dept: FAMILY MEDICINE CLINIC | Age: 50
End: 2020-01-27

## 2020-01-30 ENCOUNTER — TELEPHONE (OUTPATIENT)
Dept: FAMILY MEDICINE CLINIC | Age: 50
End: 2020-01-30

## 2020-01-30 NOTE — TELEPHONE ENCOUNTER
Patient called stating she is working on getting into a treatment facility but they would like to know if she needs to be weaned off the Ativan or can just stop it. She stated she has been taking it as needed and has 7 pills left.

## 2020-03-04 ENCOUNTER — TELEPHONE (OUTPATIENT)
Dept: FAMILY MEDICINE CLINIC | Age: 50
End: 2020-03-04

## 2020-03-04 NOTE — TELEPHONE ENCOUNTER
Mother (jassi ruth) called to inform that patient is in rehab and had 3 seizures. Patient was taken to ER. Mother stated that before her seizures were caused due to low magnesium. This was not the case. She is asking for a referral to Neurology.      She would like to use Dr. Yolande Reeves at [facility]

## 2020-03-14 ENCOUNTER — HOSPITAL ENCOUNTER (EMERGENCY)
Age: 50
Discharge: HOME | End: 2020-03-14
Attending: EMERGENCY MEDICINE
Payer: COMMERCIAL

## 2020-03-14 VITALS
WEIGHT: 144 LBS | BODY MASS INDEX: 23.99 KG/M2 | HEART RATE: 73 BPM | TEMPERATURE: 98.1 F | DIASTOLIC BLOOD PRESSURE: 88 MMHG | OXYGEN SATURATION: 97 % | RESPIRATION RATE: 14 BRPM | SYSTOLIC BLOOD PRESSURE: 115 MMHG | HEIGHT: 65 IN

## 2020-03-14 LAB
DATE, STOOL #1: ABNORMAL
DATE, STOOL #2: ABNORMAL
DATE, STOOL #3: ABNORMAL
HEMOCCULT SP1 STL QL: POSITIVE
HEMOCCULT SP2 STL QL: ABNORMAL
HEMOCCULT SP3 STL QL: ABNORMAL
TIME, STOOL #1: ABNORMAL
TIME, STOOL #2: ABNORMAL
TIME, STOOL #3: ABNORMAL

## 2020-03-14 PROCEDURE — 82272 OCCULT BLD FECES 1-3 TESTS: CPT

## 2020-03-14 PROCEDURE — 99283 EMERGENCY DEPT VISIT LOW MDM: CPT

## 2020-03-14 PROCEDURE — 87506 IADNA-DNA/RNA PROBE TQ 6-11: CPT

## 2020-03-14 PROCEDURE — 87493 C DIFF AMPLIFIED PROBE: CPT

## 2020-03-14 RX ORDER — LOPERAMIDE HYDROCHLORIDE 2 MG/1
2 CAPSULE ORAL 2 TIMES DAILY PRN
Qty: 10 CAPSULE | Refills: 0 | Status: SHIPPED | OUTPATIENT
Start: 2020-03-14 | End: 2020-03-24

## 2020-03-14 ASSESSMENT — ENCOUNTER SYMPTOMS
VOMITING: 0
DIARRHEA: 0
SINUS PRESSURE: 0
NAUSEA: 0
WHEEZING: 0
SHORTNESS OF BREATH: 0
RHINORRHEA: 0
CONSTIPATION: 0
COLOR CHANGE: 0
SORE THROAT: 0
ABDOMINAL PAIN: 0
COUGH: 0

## 2020-03-14 NOTE — ED PROVIDER NOTES
daily, Disp-180 tablet, R-0Normal      amLODIPine (NORVASC) 5 MG tablet Take 1 tablet by mouth daily, Disp-90 tablet, R-1Normal      vitamin B-1 (THIAMINE) 100 MG tablet Take 1 tablet by mouth daily, Disp-30 tablet, R-3Normal      fluticasone (FLONASE) 50 MCG/ACT nasal spray 1 spray by Nasal route daily, Disp-1 Bottle, R-6Normal      vitamin D (ERGOCALCIFEROL) 76468 units CAPS capsule Take 1 capsule by mouth once a week, Disp-12 capsule, [id number]      folic acid (FOLVITE) 1 MG tablet Take 1 tablet by mouth daily, Disp-90 tablet, R-1Normal             PAST MEDICAL HISTORY         Diagnosis Date    Closed fracture of lateral portion of left tibial plateau 0/7/6724    Hypertension     Pain, joint, ankle and foot        SURGICAL HISTORY           Procedure Laterality Date    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    FRACTURE SURGERY Left 7-3-13    ORIF tibial plateau    FRACTURE SURGERY Right     small finger metacarpal fracture    HYSTERECTOMY  2003         FAMILY HISTORY           Problem Relation Age of Onset    Other Father     Cancer Maternal Grandmother     Heart Disease Paternal Grandmother      Family Status   Relation Name Status    Father  (Not Specified)    MGM  (Not Specified)    PGM  (Not Specified)        SOCIAL HISTORY      reports that she has been smoking cigarettes. She has a 30.00 pack-year smoking history. She has never used smokeless tobacco. She reports previous alcohol use. She reports current drug use. Drug: Marijuana. REVIEW OF SYSTEMS    (2-9 systems for level 4, 10 or more for level 5)     Review of Systems   Constitutional: Negative for chills, fever and unexpected weight change. HENT: Negative for congestion, rhinorrhea, sinus pressure and sore throat. Respiratory: Negative for cough, shortness of breath and wheezing. Cardiovascular: Negative for chest pain and palpitations.    Gastrointestinal: Negative for abdominal pain, constipation, diarrhea, nausea

## 2020-03-15 LAB
C DIFFICILE TOXINS, PCR: NORMAL
CAMPYLOBACTER PCR: NORMAL
E COLI ENTEROTOXIGENIC PCR: NORMAL
PLESIOMONAS SHIGELLOIDES PCR: NORMAL
SALMONELLA PCR: NORMAL
SHIGATOXIN GENE PCR: NORMAL
SHIGELLA SP PCR: NORMAL
SPECIMEN DESCRIPTION: NORMAL
SPECIMEN DESCRIPTION: NORMAL
VIBRIO PCR: NORMAL
YERSINIA ENTEROCOLITICA PCR: NORMAL

## 2020-06-02 ENCOUNTER — HOSPITAL ENCOUNTER (EMERGENCY)
Age: 50
Discharge: HOME | End: 2020-06-02
Attending: EMERGENCY MEDICINE
Payer: MEDICARE

## 2020-06-02 ENCOUNTER — APPOINTMENT (OUTPATIENT)
Dept: GENERAL RADIOLOGY | Age: 50
End: 2020-06-02
Payer: MEDICARE

## 2020-06-02 VITALS
WEIGHT: 150 LBS | BODY MASS INDEX: 24.99 KG/M2 | OXYGEN SATURATION: 93 % | HEIGHT: 65 IN | DIASTOLIC BLOOD PRESSURE: 96 MMHG | TEMPERATURE: 99.3 F | SYSTOLIC BLOOD PRESSURE: 141 MMHG | RESPIRATION RATE: 20 BRPM | HEART RATE: 102 BPM

## 2020-06-02 PROCEDURE — 99284 EMERGENCY DEPT VISIT MOD MDM: CPT

## 2020-06-02 PROCEDURE — 6370000000 HC RX 637 (ALT 250 FOR IP): Performed by: EMERGENCY MEDICINE

## 2020-06-02 PROCEDURE — 71045 X-RAY EXAM CHEST 1 VIEW: CPT

## 2020-06-02 RX ORDER — PREDNISONE 20 MG/1
60 TABLET ORAL ONCE
Status: COMPLETED | OUTPATIENT
Start: 2020-06-02 | End: 2020-06-02

## 2020-06-02 RX ORDER — PREDNISONE 20 MG/1
20 TABLET ORAL DAILY
Qty: 5 TABLET | Refills: 0 | Status: SHIPPED | OUTPATIENT
Start: 2020-06-02 | End: 2020-06-07

## 2020-06-02 RX ORDER — LORAZEPAM 1 MG/1
1 TABLET ORAL ONCE
Status: COMPLETED | OUTPATIENT
Start: 2020-06-02 | End: 2020-06-02

## 2020-06-02 RX ORDER — CARBAMAZEPINE 100 MG/1
200 TABLET, EXTENDED RELEASE ORAL 3 TIMES DAILY
COMMUNITY
End: 2020-06-11 | Stop reason: SDUPTHER

## 2020-06-02 RX ADMIN — PREDNISONE 60 MG: 20 TABLET ORAL at 20:39

## 2020-06-02 RX ADMIN — LORAZEPAM 1 MG: 1 TABLET ORAL at 20:40

## 2020-06-02 ASSESSMENT — ENCOUNTER SYMPTOMS
SHORTNESS OF BREATH: 1
DIARRHEA: 0
ABDOMINAL PAIN: 0
COUGH: 1
BACK PAIN: 1
VOMITING: 0

## 2020-06-02 ASSESSMENT — PAIN DESCRIPTION - PAIN TYPE: TYPE: ACUTE PAIN

## 2020-06-02 ASSESSMENT — PAIN DESCRIPTION - DESCRIPTORS: DESCRIPTORS: ACHING

## 2020-06-02 ASSESSMENT — PAIN SCALES - GENERAL: PAINLEVEL_OUTOF10: 8

## 2020-06-02 ASSESSMENT — PAIN DESCRIPTION - LOCATION: LOCATION: BACK

## 2020-06-02 ASSESSMENT — PAIN DESCRIPTION - ORIENTATION: ORIENTATION: MID

## 2020-06-03 ENCOUNTER — CARE COORDINATION (OUTPATIENT)
Dept: OTHER | Facility: CLINIC | Age: 50
End: 2020-06-03

## 2020-06-03 NOTE — CARE COORDINATION
Luke [facility] Transitions Initial Follow Up Call    Call within 2 business days of discharge: Yes    Patient: Jeanette Cam Patient : 1970   MRN: [id number]  Reason for Visit: Alcohol abuse, Cough  Discharge Date: 20 RARS: Readmission Risk Score: 20      Last Discharge [facility]       Complaint Diagnosis Description Type Department Provider    20 Alcohol Problem Alcohol abuse . .. ED (DISCHARGE) [facility] ED MD VALDO Howard attempted to reach patient for Care Transitions call. HIPAA compliant message left requesting a return phone call at patients convenience. Will continue to follow. [facility] also sent [employer] message to patient. Teri Taylor RN BSN  Associate Care Manager  Phone: [phone]  Email: [email or web address]

## 2020-06-03 NOTE — ED PROVIDER NOTES
94%   BMI 24.96 kg/m²    Physical Exam  Vitals signs and nursing note reviewed. Constitutional:       General: She is not in acute distress. Appearance: She is well-developed. HENT:      Head: Normocephalic and atraumatic. Eyes:      Conjunctiva/sclera: Conjunctivae normal.   Neck:      Musculoskeletal: Neck supple. Cardiovascular:      Rate and Rhythm: Regular rhythm. Tachycardia present. Heart sounds: No murmur. No friction rub. Pulmonary:      Effort: Pulmonary effort is normal. No respiratory distress. Breath sounds: Normal breath sounds. No wheezing or rhonchi. Abdominal:      General: There is no distension. Palpations: Abdomen is soft. Tenderness: There is no abdominal tenderness. There is no guarding or rebound. Skin:     General: Skin is warm and dry. Capillary Refill: Capillary refill takes less than 2 seconds. Neurological:      Mental Status: She is alert. Comments: Mild resting tremor, able to ambulate   Psychiatric:      Comments: Appears very anxious       DIAGNOSTIC RESULTS   RADIOLOGY:All plain film, CT, MRI, and formal ultrasound images (except ED bedside ultrasound) are read by the radiologist, see reports below, unless otherwisenoted in MDM or here. XR CHEST PORTABLE    (Results Pending)       EMERGENCY DEPARTMENTCOURSE:   Differential diagnosis includes reactive airway disease, coronavirus, pneumonia, pneumothorax. Although the patient states that she always feels chest pain and shortness of breath this is not new I do not believe that she requires work-up for this at this time. Patient appears very anxious instead of answering my questions she is constantly focused on how I am doing things wrong such as the volume of my voice either too loud or too soft and how my personality she does not like.   She is here with her friend she is clinically sober I do not believe that she requires further work-up most likely reactive airway disease we will

## 2020-06-05 ENCOUNTER — CARE COORDINATION (OUTPATIENT)
Dept: OTHER | Facility: CLINIC | Age: 50
End: 2020-06-05

## 2020-06-05 NOTE — LETTER
Dear [facility],    My name is Judy Medellin RN, Associate Care Manager for [facility],[address], and I have been trying to reach you. The Associate Care Management (ACM)  is a free-of-charge, confidential service provided to our employees and their family members covered by the [address] [place] [address],[address]. I can help you with care transitions such as when you come home from the hospital, when help is needed to manage your disease, or when you need assistance coordinating services or appointments. As healthcare providers, we know that patients do better when they have close follow up with a primary care provider (PCP). I can help you find one that is convenient to you and covered by your insurance. I can also help you understand any after visit instructions, such as what symptoms to watch out for, or any new or changed medications. We can work together using your preferred communication -- telephone, email, [employer]. If you do not have a [employer] account, I can help you request access. Our program is designed to provide you with the opportunity to have a [address] [place] [address] FOR CHILDREN partner with you for your healthcare needs. Due to not being able to reach you, I am closing out the current program, but will remain available to you should you have any questions. Please contact me at [phone]. Have a blessed day,      Judy Medellin RN BSN  Associate Care Manager  [phone]  [email or web address]

## 2020-06-05 NOTE — CARE COORDINATION
[facility] Transitions Follow Up Call    2020    Patient: Adams Luu  Patient : 1970   MRN: [id number]  Reason for Admission: Alcohol abuse, Cough  Discharge Date: 20 RARS: Readmission Risk Score: 20         Spoke with: N/A    ACM attempted to reach patient for Care Transitions call. HIPAA compliant message left requesting a return phone call at patients convenience. ACM signing off unless patient returns call. [facility] also sent the following message to patient via [employer] and mailed to patient's home:    Dear Judi Oswald,    My name is Toney Porter RN, Associate Care Manager for [facility], and I have been trying to reach you. The Associate Care Management (ACM)  is a free-of-charge, confidential service provided to our employees and their family members covered by the [address] [place] [address],[address]. I can help you with care transitions such as when you come home from the hospital, when help is needed to manage your disease, or when you need assistance coordinating services or appointments. As healthcare providers, we know that patients do better when they have close follow up with a primary care provider (PCP). I can help you find one that is convenient to you and covered by your insurance. I can also help you understand any after visit instructions, such as what symptoms to watch out for, or any new or changed medications.      We can work together using your preferred communication -- telephone, email, [address] If you do not have a [employer] account, I can help you request access. Our program is designed to provide you with the opportunity to have a [address] [place] [address] FOR CHILDREN partner with you for your healthcare needs. Due to not being able to reach you, I am closing out the current program, but will remain available to you should you have any questions. Please contact me at [phone].     Have a blessed day,      Toney Porter RN BSN  Associate Care

## 2020-06-11 ENCOUNTER — VIRTUAL VISIT (OUTPATIENT)
Dept: FAMILY MEDICINE CLINIC | Age: 50
End: 2020-06-11
Payer: COMMERCIAL

## 2020-06-11 PROCEDURE — G8420 CALC BMI NORM PARAMETERS: HCPCS | Performed by: INTERNAL MEDICINE

## 2020-06-11 PROCEDURE — 4004F PT TOBACCO SCREEN RCVD TLK: CPT | Performed by: INTERNAL MEDICINE

## 2020-06-11 PROCEDURE — G8427 DOCREV CUR MEDS BY ELIG CLIN: HCPCS | Performed by: INTERNAL MEDICINE

## 2020-06-11 PROCEDURE — 3017F COLORECTAL CA SCREEN DOC REV: CPT | Performed by: INTERNAL MEDICINE

## 2020-06-11 PROCEDURE — 99214 OFFICE O/P EST MOD 30 MIN: CPT | Performed by: INTERNAL MEDICINE

## 2020-06-11 RX ORDER — QUETIAPINE FUMARATE 100 MG/1
100 TABLET, FILM COATED ORAL NIGHTLY
Qty: 30 TABLET | Refills: 3 | Status: SHIPPED | OUTPATIENT
Start: 2020-06-11 | End: 2020-10-12

## 2020-06-11 RX ORDER — QUETIAPINE FUMARATE 50 MG/1
50 TABLET, FILM COATED ORAL NIGHTLY
COMMUNITY
Start: 2020-05-08 | End: 2020-06-11 | Stop reason: SDUPTHER

## 2020-06-11 RX ORDER — PROPRANOLOL HYDROCHLORIDE 40 MG/1
40 TABLET ORAL 2 TIMES DAILY
COMMUNITY
Start: 2020-05-12 | End: 2021-07-01

## 2020-06-11 RX ORDER — ESCITALOPRAM OXALATE 20 MG/1
30 TABLET ORAL DAILY
Qty: 45 TABLET | Refills: 0 | COMMUNITY
Start: 2020-06-11 | End: 2020-11-27

## 2020-06-11 RX ORDER — ROPINIROLE 1 MG/1
1 TABLET, FILM COATED ORAL NIGHTLY
COMMUNITY
Start: 2020-05-05 | End: 2020-07-08 | Stop reason: SDUPTHER

## 2020-06-11 RX ORDER — TIZANIDINE 4 MG/1
4 TABLET ORAL EVERY 8 HOURS PRN
Qty: 30 TABLET | Refills: 0 | Status: SHIPPED | OUTPATIENT
Start: 2020-06-11 | End: 2020-10-12 | Stop reason: SDUPTHER

## 2020-06-11 RX ORDER — TRAZODONE HYDROCHLORIDE 100 MG/1
100 TABLET ORAL NIGHTLY
Qty: 30 TABLET | Refills: 3 | COMMUNITY
Start: 2020-06-11 | End: 2021-02-22

## 2020-06-11 RX ORDER — PREDNISONE 10 MG/1
TABLET ORAL
Qty: 30 TABLET | Refills: 0 | Status: SHIPPED | OUTPATIENT
Start: 2020-06-11 | End: 2020-10-12 | Stop reason: ALTCHOICE

## 2020-06-11 RX ORDER — AMOXICILLIN AND CLAVULANATE POTASSIUM 875; 125 MG/1; MG/1
1 TABLET, FILM COATED ORAL 2 TIMES DAILY
Qty: 14 TABLET | Refills: 0 | Status: SHIPPED | OUTPATIENT
Start: 2020-06-11 | End: 2020-06-18

## 2020-06-11 RX ORDER — ALBUTEROL SULFATE 90 UG/1
2 AEROSOL, METERED RESPIRATORY (INHALATION) 4 TIMES DAILY PRN
Qty: 1 INHALER | Refills: 5 | Status: SHIPPED | OUTPATIENT
Start: 2020-06-11 | End: 2022-11-01 | Stop reason: SDUPTHER

## 2020-06-11 RX ORDER — GABAPENTIN 100 MG/1
200 CAPSULE ORAL 3 TIMES DAILY
Qty: 180 CAPSULE | Refills: 3 | Status: SHIPPED
Start: 2020-06-11 | End: 2020-12-28 | Stop reason: DRUGHIGH

## 2020-06-11 RX ORDER — PSEUDOEPHED/ACETAMINOPH/DIPHEN 30MG-500MG
500 TABLET ORAL 3 TIMES DAILY PRN
Status: ON HOLD | COMMUNITY
Start: 2020-05-08 | End: 2021-08-12 | Stop reason: HOSPADM

## 2020-06-11 RX ORDER — BUSPIRONE HYDROCHLORIDE 15 MG/1
15 TABLET ORAL EVERY 6 HOURS
Qty: 120 TABLET | Refills: 3 | Status: SHIPPED | OUTPATIENT
Start: 2020-06-11 | End: 2021-03-08

## 2020-06-11 RX ORDER — CARBAMAZEPINE 200 MG/1
200 TABLET, EXTENDED RELEASE ORAL 3 TIMES DAILY
Qty: 90 TABLET | Refills: 3 | Status: SHIPPED | OUTPATIENT
Start: 2020-06-11 | End: 2021-02-08

## 2020-06-11 RX ORDER — HYDROXYZINE 50 MG/1
50 TABLET, FILM COATED ORAL 3 TIMES DAILY
COMMUNITY
Start: 2020-05-13 | End: 2020-06-11 | Stop reason: SDUPTHER

## 2020-06-11 RX ORDER — AMLODIPINE BESYLATE 5 MG/1
5 TABLET ORAL DAILY
Qty: 90 TABLET | Refills: 1 | Status: SHIPPED | OUTPATIENT
Start: 2020-06-11 | End: 2021-02-08

## 2020-06-11 RX ORDER — HYDROXYZINE 50 MG/1
50 TABLET, FILM COATED ORAL 3 TIMES DAILY
Qty: 90 TABLET | Refills: 3 | Status: SHIPPED | OUTPATIENT
Start: 2020-06-11 | End: 2021-03-12

## 2020-06-11 RX ORDER — IBUPROFEN 800 MG/1
800 TABLET ORAL 3 TIMES DAILY PRN
Status: ON HOLD | COMMUNITY
Start: 2020-05-15 | End: 2020-10-26 | Stop reason: HOSPADM

## 2020-06-11 NOTE — PROGRESS NOTES
Provider, MD   QUEtiapine (SEROQUEL) 50 MG tablet Take 50 mg by mouth nightly Yes Historical Provider, MD   rOPINIRole (REQUIP) 1 MG tablet Take 1 mg by mouth nightly Yes Historical Provider, MD   carBAMazepine (TEGRETOL XR) 100 MG extended release tablet Take 100 mg by mouth 3 times daily Yes Historical Provider, MD   gabapentin (NEURONTIN) 100 MG capsule Take 2 capsules by mouth 3 times daily for 180 days. Intended supply: 90 days Yes Ludivina Perea MD   traZODone (DESYREL) 100 MG tablet Take 1 tablet by mouth nightly Yes Elen Jacob MD   escitalopram (LEXAPRO) 20 MG tablet Take 1 tablet by mouth daily Yes LOI Quinones NP   acamprosate (CAMPRAL) 333 MG tablet Take 2 tablets by mouth 3 times daily Yes LOI Quinones NP   amLODIPine (NORVASC) 5 MG tablet Take 1 tablet by mouth daily Yes Ludivina Perea MD   vitamin B-1 (THIAMINE) 100 MG tablet Take 1 tablet by mouth daily Yes Ludivina Perea MD   vitamin D (ERGOCALCIFEROL) 26706 units CAPS capsule Take 1 capsule by mouth once a week Yes Elen Jacob MD   folic acid (FOLVITE) 1 MG tablet Take 1 tablet by mouth daily Yes Elen Jacob MD       Social History     Tobacco Use    Smoking status: Current Every Day Smoker     Packs/day: 1.00     Years: 30.00     Pack years: 30.00     Types: Cigarettes    Smokeless tobacco: Never Used   Substance Use Topics    Alcohol use:  Yes     Alcohol/week: 0.0 standard drinks    Drug use: Yes     Types: Marijuana     Comment: occasional        Past Medical History:   Diagnosis Date    Closed fracture of lateral portion of left tibial plateau 3/5/4208    Depression     bipolar, major depressive disorder, ptsd, anxiety    Hypertension     Pain, joint, ankle and foot    ,   Past Surgical History:   Procedure Laterality Date    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    FRACTURE SURGERY Left 7-3-13    ORIF tibial plateau    FRACTURE SURGERY Right     small finger metacarpal needed (back pain)  Dispense: 30 tablet; Refill: 0    2. Acute bronchitis, unspecified organism  - albuterol sulfate HFA (VENTOLIN HFA) 108 (90 Base) MCG/ACT inhaler; Inhale 2 puffs into the lungs 4 times daily as needed for Wheezing  Dispense: 1 Inhaler; Refill: 5  - predniSONE (DELTASONE) 10 MG tablet; 4 tabs by mouth daily for 3 days, then 3 tabs daily for 3 days, then 2 tabs daily for 3 days, then 1 tab daily till gone. Dispense: 30 tablet; Refill: 0  - amoxicillin-clavulanate (AUGMENTIN) 875-125 MG per tablet; Take 1 tablet by mouth 2 times daily for 7 days  Dispense: 14 tablet; Refill: 0    3. Seizure disorder ([address]  - EEG; Future  - [facility] - Jacque Nicolas MD, Neurology, [place]  - carBAMazepine (TEGRETOL XR) 200 MG extended release tablet; Take 1 tablet by mouth 3 times daily  Dispense: 90 tablet; Refill: 3    4. Anxiety  - busPIRone (BUSPAR) 15 MG tablet; Take 15 mg by mouth every 6 hours  Dispense: 120 tablet; Refill: 3  - escitalopram (LEXAPRO) 20 MG tablet; Take 1.5 tablets by mouth daily  Dispense: 45 tablet; Refill: 0  - hydrOXYzine (ATARAX) 50 MG tablet; Take 1 tablet by mouth 3 times daily  Dispense: 90 tablet; Refill: 3    5. Depression, unspecified depression type      6. Moderate episode of recurrent major depressive disorder (HCC)  - escitalopram (LEXAPRO) 20 MG tablet; Take 1.5 tablets by mouth daily  Dispense: 45 tablet; Refill: 0  - traZODone (DESYREL) 100 MG tablet; Take 1 tablet by mouth nightly  Dispense: 30 tablet; Refill: 3  - QUEtiapine (SEROQUEL) 100 MG tablet; Take 1 tablet by mouth nightly  Dispense: 30 tablet; Refill: 3    7. Essential hypertension  - amLODIPine (NORVASC) 5 MG tablet; Take 1 tablet by mouth daily  Dispense: 90 tablet; Refill: 1    8. Neuropathic pain, leg, left   gabapentin (NEURONTIN) 100 MG capsule; Take 2 capsules by mouth 3 times daily for 180 days. Intended supply: 90 days  Dispense: 180 capsule; Refill: 3      No follow-ups on file. Delvin Paget is a 47 y.o.

## 2020-06-11 NOTE — PROGRESS NOTES
Patient is present for visit to discuss multiple issues. Pharmacy and medications reviewed. Patient went to rehab for 90 days. Patient has pulled a muscle in her back and aptient hasn't been feeling wel. Pt pulled muscle in her back 3 weeks ago, while coughing. Pt has been taking tylenol and IBU and it has not helped.

## 2020-06-15 ASSESSMENT — ENCOUNTER SYMPTOMS
CHOKING: 0
BACK PAIN: 1
ANAL BLEEDING: 0
CONSTIPATION: 0
DIARRHEA: 0
WHEEZING: 0
BLOOD IN STOOL: 0
ABDOMINAL PAIN: 0
VOMITING: 0
CHEST TIGHTNESS: 0
COUGH: 1
SHORTNESS OF BREATH: 1
NAUSEA: 0

## 2020-07-07 ENCOUNTER — TELEPHONE (OUTPATIENT)
Dept: FAMILY MEDICINE CLINIC | Age: 50
End: 2020-07-07

## 2020-07-07 NOTE — TELEPHONE ENCOUNTER
Jessica Pascual (mom) called in regards to patients Seroquel. Patient was in background while mother was on phone, they believe she is having a reaction. Staets she is having a stutter and right hand tremors. Spoke to Dr. Saloni Alston in passing, ok to stop. These may be signs of her body withdrawing. Mother and patient asked if requip could be refilled as well, this was prescribed while she was in rehab.      Ashley Uribe

## 2020-07-08 RX ORDER — ROPINIROLE 1 MG/1
1 TABLET, FILM COATED ORAL NIGHTLY
Qty: 90 TABLET | Refills: 1 | Status: SHIPPED | OUTPATIENT
Start: 2020-07-08 | End: 2021-04-01 | Stop reason: SDUPTHER

## 2020-07-08 NOTE — TELEPHONE ENCOUNTER
Camilo called in. If the tremor are getting worse or not improving in the next couple of days, would suggest doing labs. Please notify.

## 2020-07-09 NOTE — TELEPHONE ENCOUNTER
Magnesium and electrolytes ordered. Additional labs ordered to check TSH, hemoglobin, iron levels, Z46, folic acid, and liver function. Please notify.

## 2020-07-09 NOTE — TELEPHONE ENCOUNTER
Mom, Perfectowillard Catracho, called back wondering if patient shouldn't get magnesium checked because low mg caused her tremors and stutters in the past.  She did have a change in dosage and brand because she was taking one that gave her diarrhea.   Please advise

## 2020-07-10 ENCOUNTER — HOSPITAL ENCOUNTER (OUTPATIENT)
Age: 50
Discharge: HOME | End: 2020-07-10
Payer: COMMERCIAL

## 2020-07-10 LAB
ABSOLUTE EOS #: 0.03 K/UL (ref 0–0.44)
ABSOLUTE IMMATURE GRANULOCYTE: 0.03 K/UL (ref 0–0.3)
ABSOLUTE LYMPH #: 0.87 K/UL (ref 1.1–3.7)
ABSOLUTE MONO #: 0.31 K/UL (ref 0.1–1.2)
ALBUMIN SERPL-MCNC: 3.4 G/DL (ref 3.5–5.2)
ALBUMIN/GLOBULIN RATIO: 1 (ref 1–2.5)
ALP BLD-CCNC: 296 U/L (ref 35–104)
ALT SERPL-CCNC: 25 U/L (ref 5–33)
ANION GAP SERPL CALCULATED.3IONS-SCNC: 15 MMOL/L (ref 9–17)
AST SERPL-CCNC: 191 U/L
BASOPHILS # BLD: 0 % (ref 0–2)
BASOPHILS ABSOLUTE: <0.03 K/UL (ref 0–0.2)
BILIRUB SERPL-MCNC: 0.82 MG/DL (ref 0.3–1.2)
BUN BLDV-MCNC: 18 MG/DL (ref 6–20)
BUN/CREAT BLD: ABNORMAL (ref 9–20)
CALCIUM SERPL-MCNC: 8.6 MG/DL (ref 8.6–10.4)
CHLORIDE BLD-SCNC: 87 MMOL/L (ref 98–107)
CO2: 31 MMOL/L (ref 20–31)
CREAT SERPL-MCNC: 0.76 MG/DL (ref 0.5–0.9)
DIFFERENTIAL TYPE: ABNORMAL
EOSINOPHILS RELATIVE PERCENT: 1 % (ref 1–4)
FERRITIN: 223 UG/L (ref 13–150)
FOLATE: 20 NG/ML
GFR AFRICAN AMERICAN: >60 ML/MIN
GFR NON-AFRICAN AMERICAN: >60 ML/MIN
GFR SERPL CREATININE-BSD FRML MDRD: ABNORMAL ML/MIN/{1.73_M2}
GFR SERPL CREATININE-BSD FRML MDRD: ABNORMAL ML/MIN/{1.73_M2}
GLUCOSE BLD-MCNC: 105 MG/DL (ref 70–99)
HCT VFR BLD CALC: 36.2 % (ref 36.3–47.1)
HEMOGLOBIN: 11.8 G/DL (ref 11.9–15.1)
IMMATURE GRANULOCYTES: 1 %
IRON SATURATION: 18 % (ref 20–55)
IRON: 30 UG/DL (ref 37–145)
LYMPHOCYTES # BLD: 22 % (ref 24–43)
MAGNESIUM: 1 MG/DL (ref 1.6–2.6)
MCH RBC QN AUTO: 32.5 PG (ref 25.2–33.5)
MCHC RBC AUTO-ENTMCNC: 32.6 G/DL (ref 28.4–34.8)
MCV RBC AUTO: 99.7 FL (ref 82.6–102.9)
MONOCYTES # BLD: 8 % (ref 3–12)
NRBC AUTOMATED: 0 PER 100 WBC
PDW BLD-RTO: 18.1 % (ref 11.8–14.4)
PLATELET # BLD: ABNORMAL K/UL (ref 138–453)
PLATELET ESTIMATE: ABNORMAL
PLATELET, FLUORESCENCE: 129 K/UL (ref 138–453)
PLATELET, IMMATURE FRACTION: 12.3 % (ref 1.1–10.3)
PMV BLD AUTO: ABNORMAL FL (ref 8.1–13.5)
POTASSIUM SERPL-SCNC: 3.4 MMOL/L (ref 3.7–5.3)
RBC # BLD: 3.63 M/UL (ref 3.95–5.11)
RBC # BLD: ABNORMAL 10*6/UL
SEG NEUTROPHILS: 68 % (ref 36–65)
SEGMENTED NEUTROPHILS ABSOLUTE COUNT: 2.73 K/UL (ref 1.5–8.1)
SODIUM BLD-SCNC: 133 MMOL/L (ref 135–144)
TOTAL IRON BINDING CAPACITY: 164 UG/DL (ref 250–450)
TOTAL PROTEIN: 6.7 G/DL (ref 6.4–8.3)
TSH SERPL DL<=0.05 MIU/L-ACNC: 0.69 MIU/L (ref 0.3–5)
UNSATURATED IRON BINDING CAPACITY: 134 UG/DL (ref 112–347)
VITAMIN B-12: 1744 PG/ML (ref 232–1245)
WBC # BLD: 4 K/UL (ref 3.5–11.3)
WBC # BLD: ABNORMAL 10*3/UL

## 2020-07-10 PROCEDURE — 82746 ASSAY OF FOLIC ACID SERUM: CPT

## 2020-07-10 PROCEDURE — 83550 IRON BINDING TEST: CPT

## 2020-07-10 PROCEDURE — 84443 ASSAY THYROID STIM HORMONE: CPT

## 2020-07-10 PROCEDURE — 82728 ASSAY OF FERRITIN: CPT

## 2020-07-10 PROCEDURE — 85055 RETICULATED PLATELET ASSAY: CPT

## 2020-07-10 PROCEDURE — 80053 COMPREHEN METABOLIC PANEL: CPT

## 2020-07-10 PROCEDURE — 82607 VITAMIN B-12: CPT

## 2020-07-10 PROCEDURE — 36415 COLL VENOUS BLD VENIPUNCTURE: CPT

## 2020-07-10 PROCEDURE — 83540 ASSAY OF IRON: CPT

## 2020-07-10 PROCEDURE — 85025 COMPLETE CBC W/AUTO DIFF WBC: CPT

## 2020-07-10 PROCEDURE — 83735 ASSAY OF MAGNESIUM: CPT

## 2020-07-16 RX ORDER — FERROUS SULFATE 325(65) MG
325 TABLET ORAL 2 TIMES DAILY
Qty: 180 TABLET | Refills: 1 | Status: SHIPPED | OUTPATIENT
Start: 2020-07-16 | End: 2021-04-22 | Stop reason: SDUPTHER

## 2020-07-16 RX ORDER — POTASSIUM CHLORIDE 750 MG/1
10 TABLET, EXTENDED RELEASE ORAL DAILY
Qty: 30 TABLET | Refills: 3 | Status: SHIPPED | OUTPATIENT
Start: 2020-07-16 | End: 2020-09-10 | Stop reason: DRUGHIGH

## 2020-07-22 ENCOUNTER — TELEPHONE (OUTPATIENT)
Dept: FAMILY MEDICINE CLINIC | Age: 50
End: 2020-07-22

## 2020-07-22 NOTE — TELEPHONE ENCOUNTER
The only way to tell whether she is getting enough is through labs since the glycinate changes absorption v/s carbonate or oxide; we can repeat in two weeks. I had ordered magnesium carbonate and not mag oxide for her - please verify what she is taking.

## 2020-09-10 ENCOUNTER — HOSPITAL ENCOUNTER (OUTPATIENT)
Age: 50
Setting detail: SPECIMEN
Discharge: HOME | End: 2020-09-10
Payer: MEDICARE

## 2020-09-10 ENCOUNTER — TELEPHONE (OUTPATIENT)
Dept: PRIMARY CARE CLINIC | Age: 50
End: 2020-09-10

## 2020-09-10 LAB
ABSOLUTE EOS #: 0.04 K/UL (ref 0–0.44)
ABSOLUTE IMMATURE GRANULOCYTE: 0.04 K/UL (ref 0–0.3)
ABSOLUTE LYMPH #: 3.12 K/UL (ref 1.1–3.7)
ABSOLUTE MONO #: 0.73 K/UL (ref 0.1–1.2)
ANION GAP SERPL CALCULATED.3IONS-SCNC: 12 MMOL/L (ref 9–17)
BASOPHILS # BLD: 0 % (ref 0–2)
BASOPHILS ABSOLUTE: <0.03 K/UL (ref 0–0.2)
BUN BLDV-MCNC: 6 MG/DL (ref 6–20)
BUN/CREAT BLD: ABNORMAL (ref 9–20)
CALCIUM SERPL-MCNC: 8.4 MG/DL (ref 8.6–10.4)
CHLORIDE BLD-SCNC: 100 MMOL/L (ref 98–107)
CO2: 29 MMOL/L (ref 20–31)
CREAT SERPL-MCNC: 0.34 MG/DL (ref 0.5–0.9)
DIFFERENTIAL TYPE: ABNORMAL
EOSINOPHILS RELATIVE PERCENT: 1 % (ref 1–4)
GFR AFRICAN AMERICAN: >60 ML/MIN
GFR NON-AFRICAN AMERICAN: >60 ML/MIN
GFR SERPL CREATININE-BSD FRML MDRD: ABNORMAL ML/MIN/{1.73_M2}
GFR SERPL CREATININE-BSD FRML MDRD: ABNORMAL ML/MIN/{1.73_M2}
GLUCOSE BLD-MCNC: 130 MG/DL (ref 70–99)
HCT VFR BLD CALC: 32.1 % (ref 36.3–47.1)
HEMOGLOBIN: 10.2 G/DL (ref 11.9–15.1)
IMMATURE GRANULOCYTES: 1 %
LYMPHOCYTES # BLD: 44 % (ref 24–43)
MAGNESIUM: 1.2 MG/DL (ref 1.6–2.6)
MCH RBC QN AUTO: 32.3 PG (ref 25.2–33.5)
MCHC RBC AUTO-ENTMCNC: 31.8 G/DL (ref 28.4–34.8)
MCV RBC AUTO: 101.6 FL (ref 82.6–102.9)
MONOCYTES # BLD: 11 % (ref 3–12)
NRBC AUTOMATED: 0 PER 100 WBC
PDW BLD-RTO: 17.3 % (ref 11.8–14.4)
PLATELET # BLD: 339 K/UL (ref 138–453)
PLATELET ESTIMATE: ABNORMAL
PMV BLD AUTO: 11.1 FL (ref 8.1–13.5)
POTASSIUM SERPL-SCNC: 2.8 MMOL/L (ref 3.7–5.3)
RBC # BLD: 3.16 M/UL (ref 3.95–5.11)
RBC # BLD: ABNORMAL 10*6/UL
SEG NEUTROPHILS: 43 % (ref 36–65)
SEGMENTED NEUTROPHILS ABSOLUTE COUNT: 3.01 K/UL (ref 1.5–8.1)
SODIUM BLD-SCNC: 141 MMOL/L (ref 135–144)
WBC # BLD: 7 K/UL (ref 3.5–11.3)
WBC # BLD: ABNORMAL 10*3/UL

## 2020-09-10 RX ORDER — POTASSIUM CHLORIDE 20 MEQ/1
20 TABLET, EXTENDED RELEASE ORAL DAILY
Qty: 30 TABLET | Refills: 5 | Status: SHIPPED | OUTPATIENT
Start: 2020-09-10 | End: 2021-06-24

## 2020-09-10 NOTE — PROGRESS NOTES
Critical result of potassium at 2.8 received from the lab. Discussed with patient, potassium was low in July she was advised to start taking potassium supplementation and recheck labs in 2 weeks. Patient reports she is \"terrible with medications \", reports she maybe takes her potassium 3 times a week. She currently has no other complaints. Increase potassium to 20 mEq and recheck BMP in 1 week. Importance of taking medication daily discussed with patient.  She does follow-up with Dr. Lion Andino on Monday case discussed with Dr Lion Andino

## 2020-09-28 NOTE — TELEPHONE ENCOUNTER
Critical result of potassium at 2.8 received from the lab.  Discussed with patient, potassium was low in July she was advised to start taking potassium supplementation and recheck labs in 2 weeks.  Patient reports she is \"terrible with medications \", reports she maybe takes her potassium 3 times a week.  She currently has no other complaints.  Increase potassium to 20 mEq and recheck BMP in 1 week.  Importance of taking medication daily discussed with patient. Kenny Barclay does follow-up with Dr. Nazanin Ferrell on Monday case discussed with Dr Nazanin Ferrell

## 2020-10-12 ENCOUNTER — HOSPITAL ENCOUNTER (INPATIENT)
Age: 50
LOS: 14 days | Discharge: NURSING, REHAB OR LONG-TERM CARE | DRG: 190 | End: 2020-10-26
Attending: EMERGENCY MEDICINE | Admitting: INTERNAL MEDICINE
Payer: COMMERCIAL

## 2020-10-12 ENCOUNTER — APPOINTMENT (OUTPATIENT)
Dept: GENERAL RADIOLOGY | Age: 50
DRG: 190 | End: 2020-10-12
Payer: COMMERCIAL

## 2020-10-12 ENCOUNTER — OFFICE VISIT (OUTPATIENT)
Dept: FAMILY MEDICINE CLINIC | Age: 50
End: 2020-10-12
Payer: COMMERCIAL

## 2020-10-12 VITALS
HEART RATE: 108 BPM | BODY MASS INDEX: 19.51 KG/M2 | OXYGEN SATURATION: 84 % | WEIGHT: 121.4 LBS | TEMPERATURE: 97.2 F | DIASTOLIC BLOOD PRESSURE: 76 MMHG | HEIGHT: 66 IN | SYSTOLIC BLOOD PRESSURE: 112 MMHG

## 2020-10-12 PROBLEM — J44.1 COPD EXACERBATION (HCC): Status: ACTIVE | Noted: 2020-10-12

## 2020-10-12 LAB
ABSOLUTE EOS #: <0.03 K/UL (ref 0–0.44)
ABSOLUTE IMMATURE GRANULOCYTE: 0.07 K/UL (ref 0–0.3)
ABSOLUTE LYMPH #: 1.75 K/UL (ref 1.1–3.7)
ABSOLUTE MONO #: 0.67 K/UL (ref 0.1–1.2)
ANION GAP SERPL CALCULATED.3IONS-SCNC: 18 MMOL/L (ref 9–17)
BASOPHILS # BLD: 0 % (ref 0–2)
BASOPHILS ABSOLUTE: 0.04 K/UL (ref 0–0.2)
BNP INTERPRETATION: ABNORMAL
BUN BLDV-MCNC: 6 MG/DL (ref 6–20)
BUN/CREAT BLD: ABNORMAL (ref 9–20)
CALCIUM SERPL-MCNC: 7.8 MG/DL (ref 8.6–10.4)
CHLORIDE BLD-SCNC: 103 MMOL/L (ref 98–107)
CO2: 22 MMOL/L (ref 20–31)
CREAT SERPL-MCNC: 0.36 MG/DL (ref 0.5–0.9)
DIFFERENTIAL TYPE: ABNORMAL
EOSINOPHILS RELATIVE PERCENT: 0 % (ref 1–4)
ETHANOL PERCENT: 0.24 %
ETHANOL: 244 MG/DL
GFR AFRICAN AMERICAN: >60 ML/MIN
GFR NON-AFRICAN AMERICAN: >60 ML/MIN
GFR SERPL CREATININE-BSD FRML MDRD: ABNORMAL ML/MIN/{1.73_M2}
GFR SERPL CREATININE-BSD FRML MDRD: ABNORMAL ML/MIN/{1.73_M2}
GLUCOSE BLD-MCNC: 136 MG/DL (ref 70–99)
HCT VFR BLD CALC: 30.2 % (ref 36.3–47.1)
HEMOGLOBIN: 9.4 G/DL (ref 11.9–15.1)
IMMATURE GRANULOCYTES: 1 %
LYMPHOCYTES # BLD: 19 % (ref 24–43)
MCH RBC QN AUTO: 32.4 PG (ref 25.2–33.5)
MCHC RBC AUTO-ENTMCNC: 31.1 G/DL (ref 28.4–34.8)
MCV RBC AUTO: 104.1 FL (ref 82.6–102.9)
MONOCYTES # BLD: 7 % (ref 3–12)
NRBC AUTOMATED: 0 PER 100 WBC
PDW BLD-RTO: 17.7 % (ref 11.8–14.4)
PLATELET # BLD: 224 K/UL (ref 138–453)
PLATELET ESTIMATE: ABNORMAL
PMV BLD AUTO: 11.1 FL (ref 8.1–13.5)
POTASSIUM SERPL-SCNC: 3.1 MMOL/L (ref 3.7–5.3)
PRO-BNP: 425 PG/ML
RBC # BLD: 2.9 M/UL (ref 3.95–5.11)
RBC # BLD: ABNORMAL 10*6/UL
SARS-COV-2, RAPID: NOT DETECTED
SARS-COV-2: NORMAL
SARS-COV-2: NORMAL
SEG NEUTROPHILS: 73 % (ref 36–65)
SEGMENTED NEUTROPHILS ABSOLUTE COUNT: 6.6 K/UL (ref 1.5–8.1)
SODIUM BLD-SCNC: 143 MMOL/L (ref 135–144)
SOURCE: NORMAL
TROPONIN INTERP: NORMAL
TROPONIN INTERP: NORMAL
TROPONIN T: NORMAL NG/ML
TROPONIN T: NORMAL NG/ML
TROPONIN, HIGH SENSITIVITY: 9 NG/L (ref 0–14)
TROPONIN, HIGH SENSITIVITY: 9 NG/L (ref 0–14)
WBC # BLD: 9.1 K/UL (ref 3.5–11.3)
WBC # BLD: ABNORMAL 10*3/UL

## 2020-10-12 PROCEDURE — 6370000000 HC RX 637 (ALT 250 FOR IP): Performed by: STUDENT IN AN ORGANIZED HEALTH CARE EDUCATION/TRAINING PROGRAM

## 2020-10-12 PROCEDURE — G8427 DOCREV CUR MEDS BY ELIG CLIN: HCPCS | Performed by: INTERNAL MEDICINE

## 2020-10-12 PROCEDURE — 99214 OFFICE O/P EST MOD 30 MIN: CPT | Performed by: INTERNAL MEDICINE

## 2020-10-12 PROCEDURE — 1200000000 HC SEMI PRIVATE

## 2020-10-12 PROCEDURE — 71045 X-RAY EXAM CHEST 1 VIEW: CPT

## 2020-10-12 PROCEDURE — G0480 DRUG TEST DEF 1-7 CLASSES: HCPCS

## 2020-10-12 PROCEDURE — 3017F COLORECTAL CA SCREEN DOC REV: CPT | Performed by: INTERNAL MEDICINE

## 2020-10-12 PROCEDURE — U0002 COVID-19 LAB TEST NON-CDC: HCPCS

## 2020-10-12 PROCEDURE — G8926 SPIRO NO PERF OR DOC: HCPCS | Performed by: INTERNAL MEDICINE

## 2020-10-12 PROCEDURE — G8484 FLU IMMUNIZE NO ADMIN: HCPCS | Performed by: INTERNAL MEDICINE

## 2020-10-12 PROCEDURE — 93005 ELECTROCARDIOGRAM TRACING: CPT | Performed by: STUDENT IN AN ORGANIZED HEALTH CARE EDUCATION/TRAINING PROGRAM

## 2020-10-12 PROCEDURE — 94640 AIRWAY INHALATION TREATMENT: CPT | Performed by: INTERNAL MEDICINE

## 2020-10-12 PROCEDURE — 96366 THER/PROPH/DIAG IV INF ADDON: CPT

## 2020-10-12 PROCEDURE — 4004F PT TOBACCO SCREEN RCVD TLK: CPT | Performed by: INTERNAL MEDICINE

## 2020-10-12 PROCEDURE — 85025 COMPLETE CBC W/AUTO DIFF WBC: CPT

## 2020-10-12 PROCEDURE — 80048 BASIC METABOLIC PNL TOTAL CA: CPT

## 2020-10-12 PROCEDURE — 94640 AIRWAY INHALATION TREATMENT: CPT

## 2020-10-12 PROCEDURE — 84484 ASSAY OF TROPONIN QUANT: CPT

## 2020-10-12 PROCEDURE — 96365 THER/PROPH/DIAG IV INF INIT: CPT

## 2020-10-12 PROCEDURE — 3023F SPIROM DOC REV: CPT | Performed by: INTERNAL MEDICINE

## 2020-10-12 PROCEDURE — 95819 EEG AWAKE AND ASLEEP: CPT | Performed by: PSYCHIATRY & NEUROLOGY

## 2020-10-12 PROCEDURE — 99285 EMERGENCY DEPT VISIT HI MDM: CPT

## 2020-10-12 PROCEDURE — G8420 CALC BMI NORM PARAMETERS: HCPCS | Performed by: INTERNAL MEDICINE

## 2020-10-12 PROCEDURE — 6360000002 HC RX W HCPCS: Performed by: STUDENT IN AN ORGANIZED HEALTH CARE EDUCATION/TRAINING PROGRAM

## 2020-10-12 PROCEDURE — 83880 ASSAY OF NATRIURETIC PEPTIDE: CPT

## 2020-10-12 RX ORDER — PREDNISONE 10 MG/1
TABLET ORAL
Qty: 30 TABLET | Refills: 0 | Status: SHIPPED | OUTPATIENT
Start: 2020-10-12 | End: 2020-10-12

## 2020-10-12 RX ORDER — METHYLPREDNISOLONE SODIUM SUCCINATE 125 MG/2ML
125 INJECTION, POWDER, LYOPHILIZED, FOR SOLUTION INTRAMUSCULAR; INTRAVENOUS ONCE
Status: COMPLETED | OUTPATIENT
Start: 2020-10-12 | End: 2020-10-12

## 2020-10-12 RX ORDER — TIZANIDINE 4 MG/1
4 TABLET ORAL EVERY 8 HOURS PRN
Qty: 30 TABLET | Refills: 3 | Status: SHIPPED | OUTPATIENT
Start: 2020-10-12 | End: 2021-03-31 | Stop reason: SDUPTHER

## 2020-10-12 RX ORDER — NEOMYCIN SULFATE, POLYMYXIN B SULFATE, AND DEXAMETHASONE 3.5; 10000; 1 MG/G; [USP'U]/G; MG/G
OINTMENT OPHTHALMIC
COMMUNITY
Start: 2020-08-18 | End: 2020-10-12

## 2020-10-12 RX ORDER — IPRATROPIUM BROMIDE AND ALBUTEROL SULFATE 2.5; .5 MG/3ML; MG/3ML
1 SOLUTION RESPIRATORY (INHALATION) ONCE
Status: COMPLETED | OUTPATIENT
Start: 2020-10-12 | End: 2020-10-12

## 2020-10-12 RX ORDER — ALBUTEROL SULFATE 90 UG/1
4 AEROSOL, METERED RESPIRATORY (INHALATION) EVERY 4 HOURS PRN
Status: DISCONTINUED | OUTPATIENT
Start: 2020-10-12 | End: 2020-10-13

## 2020-10-12 RX ORDER — TIOTROPIUM BROMIDE INHALATION SPRAY 1.56 UG/1
2 SPRAY, METERED RESPIRATORY (INHALATION) DAILY
Qty: 1 INHALER | Refills: 3 | Status: SHIPPED | OUTPATIENT
Start: 2020-10-12 | End: 2021-03-31

## 2020-10-12 RX ORDER — MAGNESIUM SULFATE 1 G/100ML
2 INJECTION INTRAVENOUS ONCE
Status: COMPLETED | OUTPATIENT
Start: 2020-10-12 | End: 2020-10-13

## 2020-10-12 RX ADMIN — IPRATROPIUM BROMIDE AND ALBUTEROL SULFATE 1 AMPULE: 2.5; .5 SOLUTION RESPIRATORY (INHALATION) at 18:12

## 2020-10-12 RX ADMIN — MAGNESIUM SULFATE HEPTAHYDRATE 2 G: 1 INJECTION, SOLUTION INTRAVENOUS at 22:19

## 2020-10-12 RX ADMIN — ALBUTEROL SULFATE 4 PUFF: 90 AEROSOL, METERED RESPIRATORY (INHALATION) at 22:10

## 2020-10-12 RX ADMIN — METHYLPREDNISOLONE SODIUM SUCCINATE 125 MG: 125 INJECTION, POWDER, FOR SOLUTION INTRAMUSCULAR; INTRAVENOUS at 21:34

## 2020-10-12 RX ADMIN — IPRATROPIUM BROMIDE 4 PUFF: 17 AEROSOL, METERED RESPIRATORY (INHALATION) at 22:11

## 2020-10-12 ASSESSMENT — PAIN SCALES - GENERAL: PAINLEVEL_OUTOF10: 5

## 2020-10-12 ASSESSMENT — PAIN DESCRIPTION - PAIN TYPE: TYPE: CHRONIC PAIN

## 2020-10-12 ASSESSMENT — PAIN DESCRIPTION - LOCATION: LOCATION: FOOT

## 2020-10-12 ASSESSMENT — PAIN DESCRIPTION - ORIENTATION: ORIENTATION: LEFT;RIGHT

## 2020-10-12 NOTE — ED TRIAGE NOTES
Pt to room 18 per w/c, brought in by mom who states that pt was sent in from PCP with low O2 sat & shortness of breath, pt states daily smoker with hx of COPD, denies chest pain, triage completed, Denisse RT at bedside, pt placed on 2L NC, O2 sat 82% RA in triage

## 2020-10-12 NOTE — PROGRESS NOTES
Pt is here today for a F/U. She has a lot of thick congestion. She states sometimes she coughs so hard it makes her want to throw up. She wants to discuss increasing the Tegretol due to having recently having 3 seizures.

## 2020-10-12 NOTE — ED NOTES
Bed: 18  Expected date:   Expected time:   Means of arrival:   Comments:     Darshan Gutierrez RN  10/12/20 1948
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
## 2020-10-13 ENCOUNTER — APPOINTMENT (OUTPATIENT)
Dept: GENERAL RADIOLOGY | Age: 50
DRG: 190 | End: 2020-10-13
Payer: COMMERCIAL

## 2020-10-13 PROBLEM — F10.10 ALCOHOL ABUSE: Status: ACTIVE | Noted: 2020-10-13

## 2020-10-13 PROBLEM — R20.2 PARESTHESIA OF LOWER EXTREMITY: Status: ACTIVE | Noted: 2020-10-13

## 2020-10-13 LAB
ANION GAP SERPL CALCULATED.3IONS-SCNC: 13 MMOL/L (ref 9–17)
BUN BLDV-MCNC: 6 MG/DL (ref 6–20)
BUN/CREAT BLD: ABNORMAL (ref 9–20)
CALCIUM SERPL-MCNC: 7.6 MG/DL (ref 8.6–10.4)
CHLORIDE BLD-SCNC: 104 MMOL/L (ref 98–107)
CO2: 24 MMOL/L (ref 20–31)
CREAT SERPL-MCNC: 0.24 MG/DL (ref 0.5–0.9)
DIRECT EXAM: NORMAL
EKG ATRIAL RATE: 92 BPM
EKG P AXIS: 55 DEGREES
EKG P-R INTERVAL: 224 MS
EKG Q-T INTERVAL: 574 MS
EKG QRS DURATION: 86 MS
EKG QTC CALCULATION (BAZETT): 709 MS
EKG R AXIS: 68 DEGREES
EKG T AXIS: 68 DEGREES
EKG VENTRICULAR RATE: 92 BPM
GFR AFRICAN AMERICAN: >60 ML/MIN
GFR NON-AFRICAN AMERICAN: >60 ML/MIN
GFR SERPL CREATININE-BSD FRML MDRD: ABNORMAL ML/MIN/{1.73_M2}
GFR SERPL CREATININE-BSD FRML MDRD: ABNORMAL ML/MIN/{1.73_M2}
GLUCOSE BLD-MCNC: 141 MG/DL (ref 70–99)
HCT VFR BLD CALC: 27.9 % (ref 36.3–47.1)
HEMOGLOBIN: 8.7 G/DL (ref 11.9–15.1)
Lab: NORMAL
MAGNESIUM: 1.8 MG/DL (ref 1.6–2.6)
MCH RBC QN AUTO: 32 PG (ref 25.2–33.5)
MCHC RBC AUTO-ENTMCNC: 31.2 G/DL (ref 28.4–34.8)
MCV RBC AUTO: 102.6 FL (ref 82.6–102.9)
NRBC AUTOMATED: 0 PER 100 WBC
PDW BLD-RTO: 17.7 % (ref 11.8–14.4)
PLATELET # BLD: 217 K/UL (ref 138–453)
PMV BLD AUTO: 11 FL (ref 8.1–13.5)
POTASSIUM SERPL-SCNC: 3.9 MMOL/L (ref 3.7–5.3)
RBC # BLD: 2.72 M/UL (ref 3.95–5.11)
SODIUM BLD-SCNC: 141 MMOL/L (ref 135–144)
SPECIMEN DESCRIPTION: NORMAL
WBC # BLD: 7.2 K/UL (ref 3.5–11.3)

## 2020-10-13 PROCEDURE — 6370000000 HC RX 637 (ALT 250 FOR IP): Performed by: INTERNAL MEDICINE

## 2020-10-13 PROCEDURE — 6370000000 HC RX 637 (ALT 250 FOR IP): Performed by: NURSE PRACTITIONER

## 2020-10-13 PROCEDURE — 83735 ASSAY OF MAGNESIUM: CPT

## 2020-10-13 PROCEDURE — 71045 X-RAY EXAM CHEST 1 VIEW: CPT

## 2020-10-13 PROCEDURE — 1200000000 HC SEMI PRIVATE

## 2020-10-13 PROCEDURE — 96375 TX/PRO/DX INJ NEW DRUG ADDON: CPT

## 2020-10-13 PROCEDURE — 85027 COMPLETE CBC AUTOMATED: CPT

## 2020-10-13 PROCEDURE — 94640 AIRWAY INHALATION TREATMENT: CPT

## 2020-10-13 PROCEDURE — 6360000002 HC RX W HCPCS: Performed by: INTERNAL MEDICINE

## 2020-10-13 PROCEDURE — 2580000003 HC RX 258: Performed by: NURSE PRACTITIONER

## 2020-10-13 PROCEDURE — 2700000000 HC OXYGEN THERAPY PER DAY

## 2020-10-13 PROCEDURE — 87070 CULTURE OTHR SPECIMN AEROBIC: CPT

## 2020-10-13 PROCEDURE — 99222 1ST HOSP IP/OBS MODERATE 55: CPT | Performed by: INTERNAL MEDICINE

## 2020-10-13 PROCEDURE — 6360000002 HC RX W HCPCS: Performed by: NURSE PRACTITIONER

## 2020-10-13 PROCEDURE — 93010 ELECTROCARDIOGRAM REPORT: CPT | Performed by: INTERNAL MEDICINE

## 2020-10-13 PROCEDURE — 80048 BASIC METABOLIC PNL TOTAL CA: CPT

## 2020-10-13 PROCEDURE — 2580000003 HC RX 258: Performed by: INTERNAL MEDICINE

## 2020-10-13 PROCEDURE — 93970 EXTREMITY STUDY: CPT

## 2020-10-13 PROCEDURE — 87205 SMEAR GRAM STAIN: CPT

## 2020-10-13 PROCEDURE — 2060000000 HC ICU INTERMEDIATE R&B

## 2020-10-13 RX ORDER — METHYLPREDNISOLONE SODIUM SUCCINATE 40 MG/ML
40 INJECTION, POWDER, LYOPHILIZED, FOR SOLUTION INTRAMUSCULAR; INTRAVENOUS EVERY 6 HOURS
Status: COMPLETED | OUTPATIENT
Start: 2020-10-13 | End: 2020-10-15

## 2020-10-13 RX ORDER — THIAMINE MONONITRATE (VIT B1) 100 MG
100 TABLET ORAL DAILY
Status: DISCONTINUED | OUTPATIENT
Start: 2020-10-13 | End: 2020-10-26 | Stop reason: HOSPADM

## 2020-10-13 RX ORDER — LORAZEPAM 2 MG/ML
2 INJECTION INTRAMUSCULAR
Status: DISCONTINUED | OUTPATIENT
Start: 2020-10-13 | End: 2020-10-16

## 2020-10-13 RX ORDER — ONDANSETRON 2 MG/ML
4 INJECTION INTRAMUSCULAR; INTRAVENOUS EVERY 6 HOURS PRN
Status: DISCONTINUED | OUTPATIENT
Start: 2020-10-13 | End: 2020-10-26 | Stop reason: HOSPADM

## 2020-10-13 RX ORDER — ESCITALOPRAM OXALATE 10 MG/1
30 TABLET ORAL DAILY
Status: DISCONTINUED | OUTPATIENT
Start: 2020-10-13 | End: 2020-10-18

## 2020-10-13 RX ORDER — FOLIC ACID 1 MG/1
1 TABLET ORAL DAILY
Status: DISCONTINUED | OUTPATIENT
Start: 2020-10-13 | End: 2020-10-26 | Stop reason: HOSPADM

## 2020-10-13 RX ORDER — PROMETHAZINE HYDROCHLORIDE 12.5 MG/1
12.5 TABLET ORAL EVERY 6 HOURS PRN
Status: DISCONTINUED | OUTPATIENT
Start: 2020-10-13 | End: 2020-10-26 | Stop reason: HOSPADM

## 2020-10-13 RX ORDER — ALBUTEROL SULFATE 90 UG/1
2 AEROSOL, METERED RESPIRATORY (INHALATION) 4 TIMES DAILY
Status: DISCONTINUED | OUTPATIENT
Start: 2020-10-13 | End: 2020-10-15

## 2020-10-13 RX ORDER — SODIUM CHLORIDE 0.9 % (FLUSH) 0.9 %
10 SYRINGE (ML) INJECTION PRN
Status: DISCONTINUED | OUTPATIENT
Start: 2020-10-13 | End: 2020-10-26 | Stop reason: HOSPADM

## 2020-10-13 RX ORDER — PREDNISONE 20 MG/1
40 TABLET ORAL DAILY
Status: DISCONTINUED | OUTPATIENT
Start: 2020-10-15 | End: 2020-10-13

## 2020-10-13 RX ORDER — METHYLPREDNISOLONE SODIUM SUCCINATE 125 MG/2ML
40 INJECTION, POWDER, LYOPHILIZED, FOR SOLUTION INTRAMUSCULAR; INTRAVENOUS EVERY 6 HOURS
Status: DISCONTINUED | OUTPATIENT
Start: 2020-10-13 | End: 2020-10-13

## 2020-10-13 RX ORDER — TRAZODONE HYDROCHLORIDE 100 MG/1
100 TABLET ORAL NIGHTLY
Status: DISCONTINUED | OUTPATIENT
Start: 2020-10-13 | End: 2020-10-26 | Stop reason: HOSPADM

## 2020-10-13 RX ORDER — NICOTINE 21 MG/24HR
1 PATCH, TRANSDERMAL 24 HOURS TRANSDERMAL DAILY PRN
Status: DISCONTINUED | OUTPATIENT
Start: 2020-10-13 | End: 2020-10-26 | Stop reason: HOSPADM

## 2020-10-13 RX ORDER — MULTIVITAMIN WITH IRON
1 TABLET ORAL DAILY
Status: DISCONTINUED | OUTPATIENT
Start: 2020-10-13 | End: 2020-10-26 | Stop reason: HOSPADM

## 2020-10-13 RX ORDER — PREDNISONE 20 MG/1
40 TABLET ORAL DAILY
Status: DISCONTINUED | OUTPATIENT
Start: 2020-10-15 | End: 2020-10-16

## 2020-10-13 RX ORDER — LORAZEPAM 2 MG/ML
1 INJECTION INTRAMUSCULAR
Status: DISCONTINUED | OUTPATIENT
Start: 2020-10-13 | End: 2020-10-16

## 2020-10-13 RX ORDER — BUSPIRONE HYDROCHLORIDE 15 MG/1
15 TABLET ORAL EVERY 6 HOURS
Status: DISCONTINUED | OUTPATIENT
Start: 2020-10-13 | End: 2020-10-26 | Stop reason: HOSPADM

## 2020-10-13 RX ORDER — IPRATROPIUM BROMIDE AND ALBUTEROL SULFATE 2.5; .5 MG/3ML; MG/3ML
1 SOLUTION RESPIRATORY (INHALATION)
Status: DISCONTINUED | OUTPATIENT
Start: 2020-10-13 | End: 2020-10-13

## 2020-10-13 RX ORDER — THIAMINE MONONITRATE (VIT B1) 100 MG
100 TABLET ORAL DAILY
Status: DISCONTINUED | OUTPATIENT
Start: 2020-10-13 | End: 2020-10-13 | Stop reason: SDUPTHER

## 2020-10-13 RX ORDER — GABAPENTIN 100 MG/1
200 CAPSULE ORAL 3 TIMES DAILY
Status: DISCONTINUED | OUTPATIENT
Start: 2020-10-13 | End: 2020-10-26 | Stop reason: HOSPADM

## 2020-10-13 RX ORDER — AMLODIPINE BESYLATE 10 MG/1
5 TABLET ORAL DAILY
Status: DISCONTINUED | OUTPATIENT
Start: 2020-10-13 | End: 2020-10-26 | Stop reason: HOSPADM

## 2020-10-13 RX ORDER — ALBUTEROL SULFATE 2.5 MG/3ML
2.5 SOLUTION RESPIRATORY (INHALATION)
Status: DISCONTINUED | OUTPATIENT
Start: 2020-10-13 | End: 2020-10-13

## 2020-10-13 RX ORDER — FAMOTIDINE 20 MG/1
20 TABLET, FILM COATED ORAL 2 TIMES DAILY
Status: DISCONTINUED | OUTPATIENT
Start: 2020-10-13 | End: 2020-10-26 | Stop reason: HOSPADM

## 2020-10-13 RX ORDER — LANOLIN ALCOHOL/MO/W.PET/CERES
325 CREAM (GRAM) TOPICAL 2 TIMES DAILY
Status: DISCONTINUED | OUTPATIENT
Start: 2020-10-13 | End: 2020-10-26 | Stop reason: HOSPADM

## 2020-10-13 RX ORDER — CARBAMAZEPINE 100 MG/1
200 TABLET, EXTENDED RELEASE ORAL 3 TIMES DAILY
Status: DISCONTINUED | OUTPATIENT
Start: 2020-10-13 | End: 2020-10-26 | Stop reason: HOSPADM

## 2020-10-13 RX ORDER — ACETAMINOPHEN 650 MG/1
650 SUPPOSITORY RECTAL EVERY 6 HOURS PRN
Status: DISCONTINUED | OUTPATIENT
Start: 2020-10-13 | End: 2020-10-18

## 2020-10-13 RX ORDER — LORAZEPAM 0.5 MG/1
1 TABLET ORAL
Status: DISCONTINUED | OUTPATIENT
Start: 2020-10-13 | End: 2020-10-16

## 2020-10-13 RX ORDER — PROPRANOLOL HYDROCHLORIDE 40 MG/1
40 TABLET ORAL 2 TIMES DAILY
Status: DISCONTINUED | OUTPATIENT
Start: 2020-10-13 | End: 2020-10-26 | Stop reason: HOSPADM

## 2020-10-13 RX ORDER — POLYETHYLENE GLYCOL 3350 17 G/17G
17 POWDER, FOR SOLUTION ORAL DAILY PRN
Status: DISCONTINUED | OUTPATIENT
Start: 2020-10-13 | End: 2020-10-26 | Stop reason: HOSPADM

## 2020-10-13 RX ORDER — ALBUTEROL SULFATE 2.5 MG/3ML
2.5 SOLUTION RESPIRATORY (INHALATION)
Status: DISCONTINUED | OUTPATIENT
Start: 2020-10-13 | End: 2020-10-26 | Stop reason: HOSPADM

## 2020-10-13 RX ORDER — LORAZEPAM 0.5 MG/1
2 TABLET ORAL
Status: DISCONTINUED | OUTPATIENT
Start: 2020-10-13 | End: 2020-10-16

## 2020-10-13 RX ORDER — LORAZEPAM 2 MG/ML
4 INJECTION INTRAMUSCULAR
Status: DISCONTINUED | OUTPATIENT
Start: 2020-10-13 | End: 2020-10-16

## 2020-10-13 RX ORDER — LORAZEPAM 2 MG/1
4 TABLET ORAL
Status: DISCONTINUED | OUTPATIENT
Start: 2020-10-13 | End: 2020-10-16

## 2020-10-13 RX ORDER — POTASSIUM CHLORIDE 20 MEQ/1
20 TABLET, EXTENDED RELEASE ORAL DAILY
Status: DISCONTINUED | OUTPATIENT
Start: 2020-10-13 | End: 2020-10-18

## 2020-10-13 RX ORDER — LORAZEPAM 2 MG/ML
3 INJECTION INTRAMUSCULAR
Status: DISCONTINUED | OUTPATIENT
Start: 2020-10-13 | End: 2020-10-16

## 2020-10-13 RX ORDER — SODIUM CHLORIDE 0.9 % (FLUSH) 0.9 %
10 SYRINGE (ML) INJECTION EVERY 12 HOURS SCHEDULED
Status: DISCONTINUED | OUTPATIENT
Start: 2020-10-13 | End: 2020-10-26 | Stop reason: HOSPADM

## 2020-10-13 RX ORDER — ROPINIROLE 1 MG/1
1 TABLET, FILM COATED ORAL NIGHTLY
Status: DISCONTINUED | OUTPATIENT
Start: 2020-10-13 | End: 2020-10-26 | Stop reason: HOSPADM

## 2020-10-13 RX ORDER — HYDROXYZINE HYDROCHLORIDE 25 MG/1
50 TABLET, FILM COATED ORAL 3 TIMES DAILY
Status: DISCONTINUED | OUTPATIENT
Start: 2020-10-13 | End: 2020-10-26 | Stop reason: HOSPADM

## 2020-10-13 RX ORDER — ACETAMINOPHEN 325 MG/1
650 TABLET ORAL EVERY 6 HOURS PRN
Status: DISCONTINUED | OUTPATIENT
Start: 2020-10-13 | End: 2020-10-26 | Stop reason: HOSPADM

## 2020-10-13 RX ORDER — BUDESONIDE AND FORMOTEROL FUMARATE DIHYDRATE 160; 4.5 UG/1; UG/1
2 AEROSOL RESPIRATORY (INHALATION) 2 TIMES DAILY
Status: DISCONTINUED | OUTPATIENT
Start: 2020-10-13 | End: 2020-10-26 | Stop reason: HOSPADM

## 2020-10-13 RX ORDER — SODIUM CHLORIDE 9 MG/ML
INJECTION, SOLUTION INTRAVENOUS CONTINUOUS
Status: DISCONTINUED | OUTPATIENT
Start: 2020-10-13 | End: 2020-10-16

## 2020-10-13 RX ADMIN — CARBAMAZEPINE 200 MG: 100 TABLET, EXTENDED RELEASE ORAL at 15:01

## 2020-10-13 RX ADMIN — BUDESONIDE AND FORMOTEROL FUMARATE DIHYDRATE 2 PUFF: 160; 4.5 AEROSOL RESPIRATORY (INHALATION) at 10:22

## 2020-10-13 RX ADMIN — GABAPENTIN 200 MG: 100 CAPSULE ORAL at 08:04

## 2020-10-13 RX ADMIN — AMLODIPINE BESYLATE 5 MG: 10 TABLET ORAL at 08:04

## 2020-10-13 RX ADMIN — ALBUTEROL SULFATE 2 PUFF: 90 AEROSOL, METERED RESPIRATORY (INHALATION) at 08:25

## 2020-10-13 RX ADMIN — SODIUM CHLORIDE, PRESERVATIVE FREE 10 ML: 5 INJECTION INTRAVENOUS at 07:48

## 2020-10-13 RX ADMIN — BUSPIRONE HYDROCHLORIDE 15 MG: 15 TABLET ORAL at 04:56

## 2020-10-13 RX ADMIN — SODIUM CHLORIDE: 9 INJECTION, SOLUTION INTRAVENOUS at 20:05

## 2020-10-13 RX ADMIN — FOLIC ACID 1 MG: 1 TABLET ORAL at 08:05

## 2020-10-13 RX ADMIN — HYDROXYZINE HYDROCHLORIDE 50 MG: 25 TABLET, FILM COATED ORAL at 15:00

## 2020-10-13 RX ADMIN — METHYLPREDNISOLONE SODIUM SUCCINATE 40 MG: 40 INJECTION, POWDER, FOR SOLUTION INTRAMUSCULAR; INTRAVENOUS at 18:33

## 2020-10-13 RX ADMIN — THERA TABS 1 TABLET: TAB at 08:05

## 2020-10-13 RX ADMIN — ONDANSETRON 4 MG: 2 INJECTION INTRAMUSCULAR; INTRAVENOUS at 09:17

## 2020-10-13 RX ADMIN — FERROUS SULFATE TAB EC 325 MG (65 MG FE EQUIVALENT) 325 MG: 325 (65 FE) TABLET DELAYED RESPONSE at 08:13

## 2020-10-13 RX ADMIN — FAMOTIDINE 20 MG: 20 TABLET ORAL at 08:13

## 2020-10-13 RX ADMIN — LORAZEPAM 2 MG: 2 INJECTION INTRAMUSCULAR; INTRAVENOUS at 10:32

## 2020-10-13 RX ADMIN — BUSPIRONE HYDROCHLORIDE 15 MG: 15 TABLET ORAL at 15:01

## 2020-10-13 RX ADMIN — METHYLPREDNISOLONE SODIUM SUCCINATE 40 MG: 40 INJECTION, POWDER, FOR SOLUTION INTRAMUSCULAR; INTRAVENOUS at 10:57

## 2020-10-13 RX ADMIN — GABAPENTIN 200 MG: 100 CAPSULE ORAL at 15:00

## 2020-10-13 RX ADMIN — ACETAMINOPHEN 650 MG: 325 TABLET ORAL at 04:56

## 2020-10-13 RX ADMIN — HYDROXYZINE HYDROCHLORIDE 50 MG: 25 TABLET, FILM COATED ORAL at 08:05

## 2020-10-13 RX ADMIN — SODIUM CHLORIDE: 9 INJECTION, SOLUTION INTRAVENOUS at 04:59

## 2020-10-13 RX ADMIN — POTASSIUM CHLORIDE 20 MEQ: 1500 TABLET, EXTENDED RELEASE ORAL at 08:06

## 2020-10-13 RX ADMIN — CEFTRIAXONE SODIUM 1 G: 1 INJECTION, POWDER, FOR SOLUTION INTRAMUSCULAR; INTRAVENOUS at 10:26

## 2020-10-13 RX ADMIN — ENOXAPARIN SODIUM 40 MG: 40 INJECTION SUBCUTANEOUS at 08:03

## 2020-10-13 RX ADMIN — ALBUTEROL SULFATE 2 PUFF: 90 AEROSOL, METERED RESPIRATORY (INHALATION) at 19:36

## 2020-10-13 RX ADMIN — BUDESONIDE AND FORMOTEROL FUMARATE DIHYDRATE 2 PUFF: 160; 4.5 AEROSOL RESPIRATORY (INHALATION) at 19:36

## 2020-10-13 RX ADMIN — CARBAMAZEPINE 200 MG: 100 TABLET, EXTENDED RELEASE ORAL at 08:04

## 2020-10-13 RX ADMIN — ESCITALOPRAM OXALATE 30 MG: 10 TABLET ORAL at 08:04

## 2020-10-13 RX ADMIN — Medication 100 MG: at 08:09

## 2020-10-13 RX ADMIN — ALBUTEROL SULFATE 2 PUFF: 90 AEROSOL, METERED RESPIRATORY (INHALATION) at 16:36

## 2020-10-13 RX ADMIN — METHYLPREDNISOLONE SODIUM SUCCINATE 40 MG: 125 INJECTION, POWDER, FOR SOLUTION INTRAMUSCULAR; INTRAVENOUS at 05:09

## 2020-10-13 ASSESSMENT — PAIN DESCRIPTION - PAIN TYPE
TYPE: CHRONIC PAIN

## 2020-10-13 ASSESSMENT — ENCOUNTER SYMPTOMS
BACK PAIN: 0
WHEEZING: 0
COUGH: 0
NAUSEA: 0
SHORTNESS OF BREATH: 1
VOMITING: 0
ABDOMINAL PAIN: 0

## 2020-10-13 ASSESSMENT — PAIN DESCRIPTION - DESCRIPTORS
DESCRIPTORS: DISCOMFORT;BURNING;ACHING
DESCRIPTORS: BURNING;DISCOMFORT

## 2020-10-13 ASSESSMENT — PAIN DESCRIPTION - ORIENTATION
ORIENTATION: RIGHT;LEFT

## 2020-10-13 ASSESSMENT — PAIN - FUNCTIONAL ASSESSMENT
PAIN_FUNCTIONAL_ASSESSMENT: ACTIVITIES ARE NOT PREVENTED
PAIN_FUNCTIONAL_ASSESSMENT: ACTIVITIES ARE NOT PREVENTED

## 2020-10-13 ASSESSMENT — PAIN DESCRIPTION - PROGRESSION
CLINICAL_PROGRESSION: NOT CHANGED

## 2020-10-13 ASSESSMENT — PAIN DESCRIPTION - ONSET
ONSET: ON-GOING
ONSET: ON-GOING

## 2020-10-13 ASSESSMENT — PAIN DESCRIPTION - LOCATION
LOCATION: FOOT;LEG

## 2020-10-13 ASSESSMENT — PAIN DESCRIPTION - FREQUENCY
FREQUENCY: CONTINUOUS
FREQUENCY: CONTINUOUS

## 2020-10-13 ASSESSMENT — PAIN SCALES - GENERAL
PAINLEVEL_OUTOF10: 6
PAINLEVEL_OUTOF10: 4

## 2020-10-13 NOTE — ED NOTES
Pt remains asleep. Awakens easily for assessment and states \"feeling loopy. \"  Pt answers questions appropriately and falls back to sleep. RR 22, SPO2 94% with O2 @2LNC, NSR 95 bpm no ectopy, 130/97mm/Hg. Pt is in an inpatient bed. SR up x4. Bed low and locked,  Call light within reach. Pt requests coffee.      Samara Manning RN  10/13/20 [address] [address], RN  10/13/20 [id number]

## 2020-10-13 NOTE — H&P
DVT or PE.   + Chronic intermittent diarrhea-last 2 to 3 years. Unchanged, denies any blood in stool.  -Known history of hypertension, depression, COPD, excision of Astrocytoma. -ED evaluation showed patient was afebrile, hypoxic to 84% on room air, intoxicated with alcohol level 244, anemic hemoglobin 9.4, COVID rapid NAAT:  Negative, chest x-ray was unremarkable no pneumonia, high sensitive troponin 9, EKG with nonspecific ST-T changes. Past Medical History:     Past Medical History:   Diagnosis Date    Closed fracture of lateral portion of left tibial plateau 2/9/6455    COPD (chronic obstructive pulmonary disease) ([facility])     Depression     bipolar, major depressive disorder, ptsd, anxiety    Hypertension     Pain, joint, ankle and foot         Past Surgical History:     Past Surgical History:   Procedure Laterality Date    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    FRACTURE SURGERY Left 7-3-13    ORIF tibial plateau    FRACTURE SURGERY Right     small finger metacarpal fracture    HYSTERECTOMY  2003        Medications Prior to Admission:     Prior to Admission medications    Medication Sig Start Date End Date Taking?  Authorizing Provider   tiotropium (SPIRIVA RESPIMAT) 1.25 MCG/ACT AERS inhaler Inhale 2 puffs into the lungs daily 10/12/20   Ludivina Cortes MD   tiZANidine (ZANAFLEX) 4 MG tablet Take 1 tablet by mouth every 8 hours as needed (back pain) 10/12/20   Ludivina Cortes MD   potassium chloride (KLOR-CON M) 20 MEQ extended release tablet Take 1 tablet by mouth daily 9/10/20   Haley Cuevas, APRN - CNP   magnesium carbonate (MAGONATE) 54 (Mag Equiv) MG/5ML LIQD oral liquid Take 5 mLs by mouth 3 times daily 7/16/20   Ludivina Cortes MD   ferrous sulfate (IRON 325) 325 (65 Fe) MG tablet Take 1 tablet by mouth 2 times daily 7/16/20   Ludivina Cortes MD   rOPINIRole (REQUIP) 1 MG tablet Take 1 tablet by mouth nightly 7/8/20   Ludivina Cortes MD   ACETAMINOPHEN [facility] STRENGTH 500 MG tablet Take 500 mg by mouth 3 times daily as needed 5/8/20   Historical Provider, MD   ibuprofen (ADVIL;MOTRIN) 800 MG tablet Take 800 mg by mouth 3 times daily as needed 5/15/20   Historical Provider, MD   propranolol (INDERAL) 40 MG tablet Take 40 mg by mouth 2 times daily 5/12/20   Historical Provider, MD   albuterol sulfate HFA (VENTOLIN HFA) 108 (90 Base) MCG/ACT inhaler Inhale 2 puffs into the lungs 4 times daily as needed for Wheezing 6/11/20   Ludivina Ellis MD   busPIRone (BUSPAR) 15 MG tablet Take 15 mg by mouth every 6 hours 6/11/20   Ludivina Ellis MD   escitalopram (LEXAPRO) 20 MG tablet Take 1.5 tablets by mouth daily 6/11/20   Ludivina Ellis MD   traZODone (DESYREL) 100 MG tablet Take 1 tablet by mouth nightly 6/11/20   Ludivina Ellis MD   carBAMazepine (TEGRETOL XR) 200 MG extended release tablet Take 1 tablet by mouth 3 times daily 6/11/20   Ludivina Ellis MD   amLODIPine (NORVASC) 5 MG tablet Take 1 tablet by mouth daily 6/11/20   Ludivina Ellis MD   gabapentin (NEURONTIN) 100 MG capsule Take 2 capsules by mouth 3 times daily for 180 days. Intended supply: 90 days 6/11/20 12/8/20  Ludivina Ellis MD   hydrOXYzine (ATARAX) 50 MG tablet Take 1 tablet by mouth 3 times daily 6/11/20   Ludivina Ellis MD   acamprosate (CAMPRAL) 333 MG tablet Take 2 tablets by mouth 3 times daily 12/10/19 10/12/20  LOI Ray NP   vitamin B-1 (THIAMINE) 100 MG tablet Take 1 tablet by mouth daily 7/12/19   Ludivina Ellis MD   vitamin D (ERGOCALCIFEROL) 03334 units CAPS capsule Take 1 capsule by mouth once a week 6/19/19   Ludivina Ellis MD   folic acid (FOLVITE) 1 MG tablet Take 1 tablet by mouth daily 6/19/19   Ludivina Ellis MD        Allergies:     Patient has no known allergies. Social History:     Tobacco:    reports that she has been smoking cigarettes. She has a 30.00 pack-year smoking history.  She has never used smokeless tobacco.  Alcohol: reports current alcohol use. Drug Use:  reports current drug use. Drug: Marijuana. Family History:     Family History   Problem Relation Age of Onset    Other Father     Cancer Maternal Grandmother     Heart Disease Paternal Grandmother        Review of Systems:     Positive and Negative as described in HPI. CONSTITUTIONAL:  negative for fevers, chills, sweats, fatigue, weight loss  HEENT:  negative for vision, hearing changes, runny nose, throat pain  RESPIRATORY: + shortness of breath, cough, congestion, wheezing  CARDIOVASCULAR:  negative for chest pain, palpitations  GASTROINTESTINAL:  negative for nausea, vomiting, +diarrhea, no abdominal pain   GENITOURINARY:  negative for difficulty of urination, burning with urination, frequency   INTEGUMENT:  negative for rash, skin lesions, easy bruising   HEMATOLOGIC/LYMPHATIC:  negative for swelling/edema   ALLERGIC/IMMUNOLOGIC:  negative for urticaria , itching  ENDOCRINE:  negative increase in drinking, increase in urination, hot or cold intolerance  MUSCULOSKELETAL:  negative joint pains, muscle aches, swelling of joints  NEUROLOGICAL:  negative for headaches, dizziness, lightheadedness, + numbness, pain, tingling extremities b/l LE- worse over Right, extending from groin into toes worse along the posterior aspect. BEHAVIOR/PSYCH:  negative for depression, anxiety    Physical Exam:   /84   Pulse 89   Temp 98.1 °F (36.7 °C) (Oral)   Resp 16   Ht 5' 5\" (1.651 m)   Wt 122 lb (55.3 kg)   SpO2 91%   BMI 20.30 kg/m²   Temp (24hrs), Av.7 °F (36.5 °C), Min:97.2 °F (36.2 °C), Max:98.1 °F (36.7 °C)    General Appearance: alert, well appearing, and in no acute distress , + coarse tremor noted in both UE: states is chronic but may be worse in last few weeks.    Mental status: oriented to person, place, and time  Head: normocephalic, atraumatic  Eye: no icterus, redness, pupils equal and reactive, extraocular eye movements intact, conjunctiva 141 (H) 70 - 99 mg/dL    BUN 6 6 - 20 mg/dL    CREATININE 0.24 (L) 0.50 - 0.90 mg/dL    Bun/Cre Ratio NOT REPORTED 9 - 20    Calcium 7.6 (L) 8.6 - 10.4 mg/dL    Sodium 141 135 - 144 mmol/L    Potassium 3.9 3.7 - 5.3 mmol/L    Chloride 104 98 - 107 mmol/L    CO2 24 20 - 31 mmol/L    Anion Gap 13 9 - 17 mmol/L    GFR Non-African American >60 >60 mL/min    GFR African American >60 >60 mL/min    GFR Comment          GFR Staging NOT REPORTED    CBC    Collection Time: 10/13/20  6:26 AM   Result Value Ref Range    WBC 7.2 3.5 - 11.3 k/uL    RBC 2.72 (L) 3.95 - 5.11 m/uL    Hemoglobin 8.7 (L) 11.9 - 15.1 g/dL    Hematocrit 27.9 (L) 36.3 - 47.1 %    .6 82.6 - 102.9 fL    MCH 32.0 25.2 - 33.5 pg    MCHC 31.2 28.4 - 34.8 g/dL    RDW 17.7 (H) 11.8 - 14.4 %    Platelets 883 065 - 089 k/uL    MPV 11.0 8.1 - 13.5 fL    NRBC Automated 0.0 0.0 per 100 WBC   Magnesium    Collection Time: 10/13/20  6:26 AM   Result Value Ref Range    Magnesium 1.8 1.6 - 2.6 mg/dL   Respiratory Culture    Collection Time: 10/13/20  8:27 AM    Specimen: Sputum Expectorated   Result Value Ref Range    Specimen Description . EXPECTORATED SPUTUM     Special Requests NOT REPORTED     Direct Exam < 10 EPITHELIAL CELLS/LPF     Direct Exam >25 NEUTROPHILS/LPF     Direct Exam MIXED BACTERIAL MORPHOTYPES SEEN ON GRAM STAIN. (A)     Culture PENDING        Imaging/Diagnostics:  Xr Chest Portable    Result Date: 10/13/2020  Clear chest, stable when compared to previous.      Xr Chest Portable    Result Date: 10/12/2020  Negative chest.       Assessment :      Hospital Problems           Last Modified POA    * (Principal) COPD exacerbation ([address] 10/13/2020 Yes    Essential hypertension 10/13/2020 Yes    Depression 10/13/2020 Yes    Alcohol abuse 10/13/2020 Yes    Paresthesia of lower extremity 10/13/2020 Yes          Plan:     Patient status inpatient in the Med/Surge   -Add empiric antibiotics, continue nasal cannula O2 as needed, respiratory bronchodilators, start Symbicort 160- 4.5:2 puffs twice daily, IV steroids.  -Continue antidepressants, antihypertensives, Neurontin.  -Lower extremity paresthesias likely related to alcohol intake. But in view of recent worsening and hypoxia, will need to rule out DVT. Will get venous Dopplers bilateral lower extremities. -CIWA scale: Watch for alcohol withdrawal/DTs. Stable at this time. -DVT/GI prophylaxis    Consultations:   IP CONSULT TO HOSPITALIST  IP CONSULT TO SOCIAL WORK     Patient is admitted as inpatient status because of co-morbidities listed above, severity of signs and symptoms as outlined, requirement for current medical therapies and most importantly because of direct risk to patient if care not provided in a hospital setting. Expected length of stay > 48 hours.     Herminia Hamilton MD  10/13/2020    Copy sent to Dr. Abdirahman Richards MD

## 2020-10-13 NOTE — ED NOTES
Pt arrived to ED with c/o SOB, cough & fatigue x 1 or 2 weeks; Pt reports she was seen at her PCP today and told to come in d/t her O2 saturation being low;   Pt reports she does not use supplemental O2 at home;  Pt reports hx of COPD;   Cough noted on arrival; pt placed on 3L O2 d/t saturation below 90% on arrival;  Pt attached to full cardiac monitor; will cont to monitor     Trevor Resendez RN  10/12/20 3989

## 2020-10-13 NOTE — ED PROVIDER NOTES
I did not see this patient, or get report on this patient; she is admitted to admitting team      Rosa Titus MD  10/13/20 [id number]

## 2020-10-13 NOTE — ED NOTES
Patient resting on cot,   Writer helped reposition patient for comfort  Patient updated on plan of care  Call light within reach     Milagros Boston RN  10/13/20 3152

## 2020-10-13 NOTE — ED PROVIDER NOTES
[facility]  ED  Emergency Department Encounter  Emergency Medicine Resident     Pt Name: Mariaelena Mcelroy  MRN: [id number]  Arinagfneel 1970  Date of evaluation: 10/12/20  PCP:  Pura Krabbe, MD    [address] [place] [address]       Chief Complaint   Patient presents with    Shortness of Breath       HISTORY [facility]  (Location/Symptom, Timing/Onset, Context/Setting, Quality, Duration, Modifying Factors,Severity.)      Mariaelena Mcelroy is a 46 yo female who presents with shortness of breath and hypoxia. Patient has not been feeling well for the past couple days and was seen at her doctor's office at which she was hypoxic with oxygen saturations in the 80s and she was sent here. Patient states that she is a heavy smoker with history of COPD but that she does not wear oxygen at home. Patient was also intoxicated. Denies any known fevers, chills, chest pain, abdominal pain, nausea, vomiting, leg swelling. Denies any history of blood clots in the legs or lungs, hemoptysis, estrogen use, recent surgeries or long travel, or history of cancer. PAST MEDICAL / SURGICAL / SOCIAL / FAMILY HISTORY      has a past medical history of Closed fracture of lateral portion of left tibial plateau, COPD (chronic obstructive pulmonary disease) ([facility] [address] Depression, Hypertension, and Pain, joint, ankle and foot. has a past surgical history that includes Hysterectomy (2003); Brain tumor excision (1989); fracture surgery (Left, 7-3-13); and fracture surgery (Right).      Social History     Socioeconomic History    Marital status: Single     Spouse name: Not on file    Number of children: Not on file    Years of education: Not on file    Highest education level: Not on file   Occupational History    Not on file   Social Needs    Financial resource strain: Not on file    Food insecurity     Worry: Not on file     Inability: Not on file    Transportation needs     Medical: Not on file     Non-medical: Not on file   Tobacco Use    Smoking status: Current Every Day Smoker     Packs/day: 1.00     Years: 30.00     Pack years: 30.00     Types: Cigarettes    Smokeless tobacco: Never Used   Substance and Sexual Activity    Alcohol use: Yes     Alcohol/week: 0.0 standard drinks    Drug use: Yes     Types: Marijuana     Comment: occasional    Sexual activity: Not on file   Lifestyle    Physical activity     Days per week: Not on file     Minutes per session: Not on file    Stress: Not on file   Relationships    Social connections     Talks on phone: Not on file     Gets together: Not on file     Attends [employer] service: Not on file     Active member of club or organization: Not on file     Attends meetings of clubs or organizations: Not on file     Relationship status: Not on file    Intimate partner violence     Fear of current or ex partner: Not on file     Emotionally abused: Not on file     Physically abused: Not on file     Forced sexual activity: Not on file   Other Topics Concern    Not on file   Social History Narrative    Not on file       Family History   Problem Relation Age of Onset    Other Father     Cancer Maternal Grandmother     Heart Disease Paternal Grandmother         Allergies:  Patient has no known allergies. Home Medications:  Prior to Admission medications    Medication Sig Start Date End Date Taking?  Authorizing Provider   tiotropium (SPIRIVA RESPIMAT) 1.25 MCG/ACT AERS inhaler Inhale 2 puffs into the lungs daily 10/12/20   Ludivina Carpio MD   tiZANidine (ZANAFLEX) 4 MG tablet Take 1 tablet by mouth every 8 hours as needed (back pain) 10/12/20   Ludivina Carpio MD   potassium chloride (KLOR-CON M) 20 MEQ extended release tablet Take 1 tablet by mouth daily 9/10/20   Abhilash Huynh APRN - CNP   magnesium carbonate (MAGONATE) 54 (Mag Equiv) MG/5ML LIQD oral liquid Take 5 mLs by mouth 3 times daily 7/16/20   Ludivina Carpio MD   ferrous sulfate (IRON 325) 325 (65 Fe) MG tablet Take 1 tablet by mouth 2 times daily 7/16/20   Ludivina Patton MD   rOPINIRole (REQUIP) 1 MG tablet Take 1 tablet by mouth nightly 7/8/20   Ludivina Patton MD   ACETAMINOPHEN EXTRA STRENGTH 500 MG tablet Take 500 mg by mouth 3 times daily as needed 5/8/20   Historical Provider, MD   ibuprofen (ADVIL;MOTRIN) 800 MG tablet Take 800 mg by mouth 3 times daily as needed 5/15/20   Historical Provider, MD   propranolol (INDERAL) 40 MG tablet Take 40 mg by mouth 2 times daily 5/12/20   Historical Provider, MD   albuterol sulfate HFA (VENTOLIN HFA) 108 (90 Base) MCG/ACT inhaler Inhale 2 puffs into the lungs 4 times daily as needed for Wheezing 6/11/20   Ludivina Patton MD   busPIRone (BUSPAR) 15 MG tablet Take 15 mg by mouth every 6 hours 6/11/20   Ludivina Patton MD   escitalopram (LEXAPRO) 20 MG tablet Take 1.5 tablets by mouth daily 6/11/20   Ludivina Patton MD   traZODone (DESYREL) 100 MG tablet Take 1 tablet by mouth nightly 6/11/20   Ludivina Patton MD   carBAMazepine (TEGRETOL XR) 200 MG extended release tablet Take 1 tablet by mouth 3 times daily 6/11/20   Ludivina Patton MD   amLODIPine (NORVASC) 5 MG tablet Take 1 tablet by mouth daily 6/11/20   Ludivina Patton MD   gabapentin (NEURONTIN) 100 MG capsule Take 2 capsules by mouth 3 times daily for 180 days.  Intended supply: 90 days 6/11/20 12/8/20  Ludivina Patton MD   hydrOXYzine (ATARAX) 50 MG tablet Take 1 tablet by mouth 3 times daily 6/11/20   Ludivina Patton MD   acamprosate (CAMPRAL) 333 MG tablet Take 2 tablets by mouth 3 times daily 12/10/19 10/12/20  LOI Robertson NP   vitamin B-1 (THIAMINE) 100 MG tablet Take 1 tablet by mouth daily 7/12/19   Ludivina Patton MD   vitamin D (ERGOCALCIFEROL) 57605 units CAPS capsule Take 1 capsule by mouth once a week 6/19/19   Ludivina Patton MD   folic acid (FOLVITE) 1 MG tablet Take 1 tablet by mouth daily 6/19/19   Ludivina Patton MD       REVIEW OFSYSTEMS    (2-9 systems for level 4, 10 or more for level 5)      Review of Systems   Constitutional: Negative for chills and fever. HENT: Negative. Respiratory: Positive for shortness of breath. Negative for cough and wheezing. Cardiovascular: Negative for chest pain, palpitations and leg swelling. Gastrointestinal: Negative for abdominal pain, nausea and vomiting. Genitourinary: Negative for dysuria and hematuria. Musculoskeletal: Negative for back pain and neck pain. Skin: Negative for rash and wound. Neurological: Negative for weakness, light-headedness and numbness. Hematological: Does not bruise/bleed easily. PHYSICAL EXAM   (up to 7 for level 4, 8 or more forlevel 5)      INITIAL VITALS:   ED Triage Vitals   BP Temp Temp Source Pulse Resp SpO2 Height Weight   10/12/20 2001 10/12/20 1946 10/12/20 1946 10/12/20 1956 10/12/20 1956 10/12/20 1956 10/12/20 1956 10/12/20 1956   109/80 98.1 °F (36.7 °C) Oral 102 22 (!) 84 % 5' 5\" (1.651 m) 122 lb (55.3 kg)       Physical Exam  Vitals signs and nursing note reviewed. Constitutional:       General: She is not in acute distress. Appearance: Normal appearance. She is not ill-appearing, toxic-appearing or diaphoretic. Comments: Patient is clinically intoxicated. Cardiovascular:      Rate and Rhythm: Normal rate and regular rhythm. Heart sounds: No murmur. No gallop. Pulmonary:      Effort: Pulmonary effort is normal. No respiratory distress. Breath sounds: Wheezing and rales present. No rhonchi. Abdominal:      General: There is no distension. Palpations: Abdomen is soft. Tenderness: There is no abdominal tenderness. There is no guarding. Musculoskeletal:         General: No tenderness. Right lower leg: No edema. Left lower leg: No edema. Skin:     General: Skin is warm and dry. Neurological:      General: No focal deficit present. Mental Status: She is alert and oriented to person, place, and time. RDW 17.7 (*)     Seg Neutrophils 73 (*)     Lymphocytes 19 (*)     Eosinophils % 0 (*)     Immature Granulocytes 1 (*)     All other components within normal limits   BASIC METABOLIC PANEL - Abnormal; Notable for the following components:    Glucose 136 (*)     CREATININE 0.36 (*)     Calcium 7.8 (*)     Potassium 3.1 (*)     Anion Gap 18 (*)     All other components within normal limits   ETHANOL - Abnormal; Notable for the following components:    Ethanol 244 (*)     Ethanol percent 0.244 (*)     All other components within normal limits   BRAIN NATRIURETIC PEPTIDE - Abnormal; Notable for the following components:    Pro- (*)     All other components within normal limits   TROPONIN   TROPONIN   COVID-19         RADIOLOGY:  Xr Chest Portable    Result Date: 10/12/2020  EXAMINATION: ONE XRAY VIEW OF THE CHEST 10/12/2020 8:33 pm COMPARISON: 06/02/2020 HISTORY: ORDERING SYSTEM PROVIDED HISTORY: SIRD(+) Reason for Exam: shortness of breath    upright port FINDINGS: The lungs are without acute focal process. No effusion or pneumothorax. The cardiomediastinal silhouette is normal.  The osseous structures are intact without acute process. Negative chest.         EKG      All EKG's are interpreted by the [address] [place] [address] Drive Physician who either signs or Co-signs this chart in the absence of a cardiologist.      PROCEDURES:  None    CONSULTS:  IP CONSULT TO HOSPITALIST  IP CONSULT TO SOCIAL WORK    CRITICAL CARE:  Please see attending note    FINAL IMPRESSION      1. COPD exacerbation ([facility]    2. Hypoxia    3. Acute alcoholic intoxication without complication ([facility] [address]          DISPOSITION / PLAN     DISPOSITION Admitted 10/12/2020 11:09:46 PM      PATIENT REFERRED TO:  No follow-up provider specified.     DISCHARGE MEDICATIONS:  New Prescriptions    No medications on file       Debbe Lennox, DO  Emergency Medicine Resident    (Please note that portions of this note were completed with a voice recognition

## 2020-10-13 NOTE — ED NOTES
Pt resting on cot, NAD noted, magnesium completed. Pt denies needs at this time. Call light within reach.       Tejas Browning RN  10/13/20 9192

## 2020-10-13 NOTE — ED PROVIDER NOTES
FACULTY SIGN-OUT  ADDENDUM       Patient: Giuseppe Hobbs   MRN: [id number]  PCP:  Praneeth Gramajo MD  Attestation  I was available and discussed any additional care issues that arose and coordinated the management plans with the resident(s) caring for the patient during my duty period. Any areas of disagreement with resident's documentation of care or procedures are noted on the chart. I was personally present for the key portions of any/all procedures during my duty period. I have documented in the chart those procedures where I was not present during the key portions. The patient's initial evaluation and plan have been discussed with the prior provider who initially evaluated the patient. Pertinent Comments:   The patient is a 45 y.o. female taken in signout with COPD exacerbation doing well  We are awaiting reevaluation and possible DC home    ED COURSE      The patient was given the following medications:  Orders Placed This Encounter   Medications    methylPREDNISolone sodium (SOLU-MEDROL) injection 125 mg    magnesium sulfate 1 g in dextrose 5% 100 mL IVPB    albuterol sulfate  (90 Base) MCG/ACT inhaler 4 puff    ipratropium (ATROVENT HFA) 17 MCG/ACT inhaler 4 puff       RECENT VITALS:   BP: 109/80  Pulse: 98  Resp: 19  Temp: 98.1 °F (36.7 °C) SpO2: 93 %    (Please note that portions of this note were completed with a voice recognition program.  Efforts were made to edit the dictations but occasionally words are mis-transcribed.)    MD Leoncio Angeles  Attending Emergency Medicine Physician       Freddy Gutierrez MD  10/12/20 3114

## 2020-10-13 NOTE — ED NOTES
Pt ambulated to restroom with steady gait and switched into hospital bed for comfort     Ronna Wilson RN  10/13/20 4108

## 2020-10-13 NOTE — ED NOTES
Pt denies any needs at this time, pt states continued improvement of tremors.       Paul Flores RN  10/13/20 9818

## 2020-10-14 ENCOUNTER — APPOINTMENT (OUTPATIENT)
Dept: GENERAL RADIOLOGY | Age: 50
DRG: 190 | End: 2020-10-14
Payer: COMMERCIAL

## 2020-10-14 LAB
ALLEN TEST: POSITIVE
CULTURE: ABNORMAL
DIRECT EXAM: ABNORMAL
FIO2: ABNORMAL
GLUCOSE BLD-MCNC: 176 MG/DL (ref 74–100)
Lab: ABNORMAL
MODE: ABNORMAL
NEGATIVE BASE EXCESS, ART: ABNORMAL (ref 0–2)
O2 DEVICE/FLOW/%: ABNORMAL
PATIENT TEMP: ABNORMAL
POC HCO3: 31.3 MMOL/L (ref 21–28)
POC LACTIC ACID: 0.63 MMOL/L (ref 0.56–1.39)
POC O2 SATURATION: 89 % (ref 94–98)
POC PCO2 TEMP: ABNORMAL MM HG
POC PCO2: 50.4 MM HG (ref 35–48)
POC PH TEMP: ABNORMAL
POC PH: 7.4 (ref 7.35–7.45)
POC PO2 TEMP: ABNORMAL MM HG
POC PO2: 57.7 MM HG (ref 83–108)
POSITIVE BASE EXCESS, ART: 6 (ref 0–3)
SAMPLE SITE: ABNORMAL
SPECIMEN DESCRIPTION: ABNORMAL
TCO2 (CALC), ART: 33 MMOL/L (ref 22–29)

## 2020-10-14 PROCEDURE — 6360000002 HC RX W HCPCS: Performed by: INTERNAL MEDICINE

## 2020-10-14 PROCEDURE — 2700000000 HC OXYGEN THERAPY PER DAY

## 2020-10-14 PROCEDURE — 82803 BLOOD GASES ANY COMBINATION: CPT

## 2020-10-14 PROCEDURE — 6370000000 HC RX 637 (ALT 250 FOR IP): Performed by: INTERNAL MEDICINE

## 2020-10-14 PROCEDURE — 97162 PT EVAL MOD COMPLEX 30 MIN: CPT

## 2020-10-14 PROCEDURE — 2060000000 HC ICU INTERMEDIATE R&B

## 2020-10-14 PROCEDURE — 97166 OT EVAL MOD COMPLEX 45 MIN: CPT

## 2020-10-14 PROCEDURE — 99232 SBSQ HOSP IP/OBS MODERATE 35: CPT | Performed by: INTERNAL MEDICINE

## 2020-10-14 PROCEDURE — 2580000003 HC RX 258: Performed by: NURSE PRACTITIONER

## 2020-10-14 PROCEDURE — 97530 THERAPEUTIC ACTIVITIES: CPT

## 2020-10-14 PROCEDURE — 94761 N-INVAS EAR/PLS OXIMETRY MLT: CPT

## 2020-10-14 PROCEDURE — 97535 SELF CARE MNGMENT TRAINING: CPT

## 2020-10-14 PROCEDURE — 6360000002 HC RX W HCPCS: Performed by: NURSE PRACTITIONER

## 2020-10-14 PROCEDURE — 6370000000 HC RX 637 (ALT 250 FOR IP): Performed by: NURSE PRACTITIONER

## 2020-10-14 PROCEDURE — 82947 ASSAY GLUCOSE BLOOD QUANT: CPT

## 2020-10-14 PROCEDURE — 94640 AIRWAY INHALATION TREATMENT: CPT

## 2020-10-14 PROCEDURE — 36600 WITHDRAWAL OF ARTERIAL BLOOD: CPT

## 2020-10-14 PROCEDURE — 71045 X-RAY EXAM CHEST 1 VIEW: CPT

## 2020-10-14 PROCEDURE — 83605 ASSAY OF LACTIC ACID: CPT

## 2020-10-14 PROCEDURE — 2580000003 HC RX 258: Performed by: INTERNAL MEDICINE

## 2020-10-14 RX ADMIN — METHYLPREDNISOLONE SODIUM SUCCINATE 40 MG: 40 INJECTION, POWDER, FOR SOLUTION INTRAMUSCULAR; INTRAVENOUS at 05:35

## 2020-10-14 RX ADMIN — SODIUM CHLORIDE, PRESERVATIVE FREE 10 ML: 5 INJECTION INTRAVENOUS at 02:23

## 2020-10-14 RX ADMIN — TRAZODONE HYDROCHLORIDE 100 MG: 100 TABLET ORAL at 20:27

## 2020-10-14 RX ADMIN — ALBUTEROL SULFATE 2.5 MG: 2.5 SOLUTION RESPIRATORY (INHALATION) at 20:45

## 2020-10-14 RX ADMIN — CARBAMAZEPINE 200 MG: 100 TABLET, EXTENDED RELEASE ORAL at 02:22

## 2020-10-14 RX ADMIN — AMLODIPINE BESYLATE 5 MG: 10 TABLET ORAL at 08:33

## 2020-10-14 RX ADMIN — CARBAMAZEPINE 200 MG: 100 TABLET, EXTENDED RELEASE ORAL at 08:32

## 2020-10-14 RX ADMIN — THERA TABS 1 TABLET: TAB at 08:33

## 2020-10-14 RX ADMIN — ALBUTEROL SULFATE 2 PUFF: 90 AEROSOL, METERED RESPIRATORY (INHALATION) at 11:47

## 2020-10-14 RX ADMIN — METHYLPREDNISOLONE SODIUM SUCCINATE 40 MG: 40 INJECTION, POWDER, FOR SOLUTION INTRAMUSCULAR; INTRAVENOUS at 02:20

## 2020-10-14 RX ADMIN — CARBAMAZEPINE 200 MG: 100 TABLET, EXTENDED RELEASE ORAL at 13:59

## 2020-10-14 RX ADMIN — HYDROXYZINE HYDROCHLORIDE 50 MG: 25 TABLET, FILM COATED ORAL at 20:27

## 2020-10-14 RX ADMIN — GABAPENTIN 200 MG: 100 CAPSULE ORAL at 02:21

## 2020-10-14 RX ADMIN — BUSPIRONE HYDROCHLORIDE 15 MG: 15 TABLET ORAL at 13:59

## 2020-10-14 RX ADMIN — HYDROXYZINE HYDROCHLORIDE 50 MG: 25 TABLET, FILM COATED ORAL at 13:59

## 2020-10-14 RX ADMIN — LORAZEPAM 2 MG: 2 INJECTION INTRAMUSCULAR; INTRAVENOUS at 14:01

## 2020-10-14 RX ADMIN — BUDESONIDE AND FORMOTEROL FUMARATE DIHYDRATE 2 PUFF: 160; 4.5 AEROSOL RESPIRATORY (INHALATION) at 20:14

## 2020-10-14 RX ADMIN — FOLIC ACID 1 MG: 1 TABLET ORAL at 08:32

## 2020-10-14 RX ADMIN — FERROUS SULFATE TAB EC 325 MG (65 MG FE EQUIVALENT) 325 MG: 325 (65 FE) TABLET DELAYED RESPONSE at 20:28

## 2020-10-14 RX ADMIN — CARBAMAZEPINE 200 MG: 100 TABLET, EXTENDED RELEASE ORAL at 20:28

## 2020-10-14 RX ADMIN — ROPINIROLE HYDROCHLORIDE 1 MG: 1 TABLET, FILM COATED ORAL at 02:22

## 2020-10-14 RX ADMIN — ESCITALOPRAM OXALATE 30 MG: 10 TABLET ORAL at 08:31

## 2020-10-14 RX ADMIN — FERROUS SULFATE TAB EC 325 MG (65 MG FE EQUIVALENT) 325 MG: 325 (65 FE) TABLET DELAYED RESPONSE at 02:22

## 2020-10-14 RX ADMIN — SODIUM CHLORIDE, PRESERVATIVE FREE 10 ML: 5 INJECTION INTRAVENOUS at 08:36

## 2020-10-14 RX ADMIN — GABAPENTIN 200 MG: 100 CAPSULE ORAL at 13:59

## 2020-10-14 RX ADMIN — ALBUTEROL SULFATE 2.5 MG: 2.5 SOLUTION RESPIRATORY (INHALATION) at 09:44

## 2020-10-14 RX ADMIN — BUSPIRONE HYDROCHLORIDE 15 MG: 15 TABLET ORAL at 08:32

## 2020-10-14 RX ADMIN — BUDESONIDE AND FORMOTEROL FUMARATE DIHYDRATE 2 PUFF: 160; 4.5 AEROSOL RESPIRATORY (INHALATION) at 09:39

## 2020-10-14 RX ADMIN — PROPRANOLOL HYDROCHLORIDE 40 MG: 40 TABLET ORAL at 02:21

## 2020-10-14 RX ADMIN — Medication 54 MG: at 13:57

## 2020-10-14 RX ADMIN — Medication 54 MG: at 02:23

## 2020-10-14 RX ADMIN — BUSPIRONE HYDROCHLORIDE 15 MG: 15 TABLET ORAL at 02:22

## 2020-10-14 RX ADMIN — METHYLPREDNISOLONE SODIUM SUCCINATE 40 MG: 40 INJECTION, POWDER, FOR SOLUTION INTRAMUSCULAR; INTRAVENOUS at 11:00

## 2020-10-14 RX ADMIN — HYDROXYZINE HYDROCHLORIDE 50 MG: 25 TABLET, FILM COATED ORAL at 02:21

## 2020-10-14 RX ADMIN — FAMOTIDINE 20 MG: 20 TABLET ORAL at 02:22

## 2020-10-14 RX ADMIN — PROPRANOLOL HYDROCHLORIDE 40 MG: 40 TABLET ORAL at 13:57

## 2020-10-14 RX ADMIN — Medication 100 MG: at 08:31

## 2020-10-14 RX ADMIN — SODIUM CHLORIDE, PRESERVATIVE FREE 10 ML: 5 INJECTION INTRAVENOUS at 22:00

## 2020-10-14 RX ADMIN — GABAPENTIN 200 MG: 100 CAPSULE ORAL at 20:27

## 2020-10-14 RX ADMIN — TRAZODONE HYDROCHLORIDE 100 MG: 100 TABLET ORAL at 02:22

## 2020-10-14 RX ADMIN — PROPRANOLOL HYDROCHLORIDE 40 MG: 40 TABLET ORAL at 22:00

## 2020-10-14 RX ADMIN — CEFTRIAXONE SODIUM 1 G: 1 INJECTION, POWDER, FOR SOLUTION INTRAMUSCULAR; INTRAVENOUS at 13:58

## 2020-10-14 RX ADMIN — FAMOTIDINE 20 MG: 20 TABLET ORAL at 08:32

## 2020-10-14 RX ADMIN — POTASSIUM CHLORIDE 20 MEQ: 1500 TABLET, EXTENDED RELEASE ORAL at 08:32

## 2020-10-14 RX ADMIN — ALBUTEROL SULFATE 2 PUFF: 90 AEROSOL, METERED RESPIRATORY (INHALATION) at 20:14

## 2020-10-14 RX ADMIN — LORAZEPAM 2 MG: 0.5 TABLET ORAL at 08:30

## 2020-10-14 RX ADMIN — METHYLPREDNISOLONE SODIUM SUCCINATE 40 MG: 40 INJECTION, POWDER, FOR SOLUTION INTRAMUSCULAR; INTRAVENOUS at 16:44

## 2020-10-14 RX ADMIN — LORAZEPAM 3 MG: 2 INJECTION INTRAMUSCULAR; INTRAVENOUS at 20:27

## 2020-10-14 RX ADMIN — BUSPIRONE HYDROCHLORIDE 15 MG: 15 TABLET ORAL at 20:27

## 2020-10-14 RX ADMIN — HYDROXYZINE HYDROCHLORIDE 50 MG: 25 TABLET, FILM COATED ORAL at 08:30

## 2020-10-14 RX ADMIN — ROPINIROLE HYDROCHLORIDE 1 MG: 1 TABLET, FILM COATED ORAL at 20:27

## 2020-10-14 RX ADMIN — ENOXAPARIN SODIUM 40 MG: 40 INJECTION SUBCUTANEOUS at 08:31

## 2020-10-14 RX ADMIN — GABAPENTIN 200 MG: 100 CAPSULE ORAL at 08:32

## 2020-10-14 RX ADMIN — FERROUS SULFATE TAB EC 325 MG (65 MG FE EQUIVALENT) 325 MG: 325 (65 FE) TABLET DELAYED RESPONSE at 08:32

## 2020-10-14 RX ADMIN — LORAZEPAM 2 MG: 2 INJECTION INTRAMUSCULAR; INTRAVENOUS at 16:44

## 2020-10-14 RX ADMIN — FAMOTIDINE 20 MG: 20 TABLET ORAL at 20:27

## 2020-10-14 ASSESSMENT — PAIN DESCRIPTION - PROGRESSION

## 2020-10-14 ASSESSMENT — PAIN DESCRIPTION - ORIENTATION
ORIENTATION: RIGHT;LEFT

## 2020-10-14 ASSESSMENT — PAIN SCALES - GENERAL
PAINLEVEL_OUTOF10: 4
PAINLEVEL_OUTOF10: 5
PAINLEVEL_OUTOF10: 4
PAINLEVEL_OUTOF10: 4

## 2020-10-14 ASSESSMENT — PAIN DESCRIPTION - DESCRIPTORS
DESCRIPTORS: ACHING;DISCOMFORT
DESCRIPTORS: ACHING;DISCOMFORT;TINGLING
DESCRIPTORS: ACHING;DISCOMFORT

## 2020-10-14 ASSESSMENT — PAIN DESCRIPTION - LOCATION
LOCATION: FOOT;LEG
LOCATION: LEG

## 2020-10-14 ASSESSMENT — PAIN DESCRIPTION - FREQUENCY
FREQUENCY: CONTINUOUS
FREQUENCY: CONTINUOUS

## 2020-10-14 ASSESSMENT — PAIN DESCRIPTION - ONSET
ONSET: ON-GOING
ONSET: ON-GOING

## 2020-10-14 ASSESSMENT — PAIN DESCRIPTION - PAIN TYPE
TYPE: CHRONIC PAIN

## 2020-10-14 NOTE — CARE COORDINATION
Case Management Initial Discharge Plan  Kin Khan,             Met with:patient to discuss discharge plans. Information verified: address, contacts, phone number, , insurance Yes    Emergency Contact/Next of Kin name & number: [phone]    PCP: Bianca Sainz MD  Date of last visit: Oct 2020    Insurance Provider: [employer]    Discharge Planning    Living Arrangements:  Alone   Support Systems:  Parent, Family Members    Home has one story    Patient able to perform ADL's:Independent    Current Services (outpatient & in home) none  DME equipment: O2  DME provider: ?    Receiving oral anticoagulation therapy? No    If indicated:   Physician managing anticoagulation treatment:   Where does patient obtain lab work for ATC treatment? Potential Assistance Needed:  N/A    Patient agreeable to home care: No  [place] of choice provided:  n/a    Prior SNF/Rehab Placement and Facility: N/A  Agreeable to SNF/Rehab: No  [place] of choice provided: n/a     Evaluation: n/a    Expected Discharge date:  10/16/20    Patient expects to be discharged to:  home  Follow Up Appointment: Best Day/ Time:     Transportation provider: mother  Transportation arrangements needed for discharge: No    Readmission Risk              Risk of Unplanned Readmission:        19             Does patient have a readmission risk score greater than 14?: Yes  If yes, follow-up appointment must be made within 7 days of discharge.      Goals of Care: breathe easier      Discharge Plan: home alone    Ph[facility]          Electronically signed by Anuj Ely RN on 10/14/20 at 10:00 AM EDT

## 2020-10-14 NOTE — PROGRESS NOTES
[facility]  Office: [address], DO, Dwain [place], DO, Natalie Nayla, DO, Marilou Light Blood, DO, Guero Diamond MD, Hazel Sotomayor MD, Louie Nair MD, Duke Long MD, Yolanda Winslow MD, Ariadne Solo MD, Brittni Spicer MD, Kati Euceda MD, Mary Chung MD, Lamonte Cooks, DO, Andre Esparza MD, Kanu Damon MD, Janell Cain DO, Daya Verde MD,  Stanislav Weber, DO, Sara Rosales MD, Ellen Casanova MD, [place], Tyra Jorgensen CNP, Ra Murphy CNP, Chucky Castillo, CNS, Felicia Bowling, CNP, Thaddeus Gamez, CNP, Yary Fernando, CNP, Cata Bower, CNP, Lidia Jones, CNP, Yadira Wagner PA-C, Tobi Gipson, [facility], Tj Lubin, CNP, Emily Bautista, CNP, Delia Zurita, CNP, Adolfo Jones, CNP, Mireille Went, [address] [place] [address]    Progress Note    Name:   Shalonda Bernstein  MRN:     [id number]     Acct:      [de-identified]   Room:   2003/2003-01  IP Day:  2  Admit Date:  10/12/2020  7:48 PM    PCP:   Raphael Manning MD  Code Status:  Full Code    Subjective:     C/C:   Chief Complaint   Patient presents with    Shortness of Breath     Interval History Status: improved. Patient states improvement in breathing. RN reports unsteady gait. Denies any new complaints. Afebrile, hemodynamically stable remains on 4 L nasal cannula oxygen. Lower extremity venous Dopplers negative for DVT. Brief History:   54-year-old brought in by family after being noted to be hypoxic at her PCPs office. Patient does complain of productive cough, bilateral lower extremity painx 2-3 months but worse in the last month. Chronic unchanged shortness of breath going on for more than a year. Has been using albuterol inhaler as needed. Continues to smoke about 1 pack/day. Drinks alcohol every day: Rum. Known history of hypertension, depression, COPD, excision of Astrocytoma.   - ED evaluation showed patient was afebrile, hypoxic to 84% on room air, intoxicated with alcohol level 244, anemic hemoglobin 9.4, COVID rapid NAAT:  Negative, chest x-ray was unremarkable no pneumonia, high sensitive troponin 9, EKG with nonspecific ST-T changes  Review of Systems:     Constitutional:  negative for chills, fevers, sweats  Respiratory:  +cough, no dyspnea on exertion,+ shortness of breath,+ wheezing  Cardiovascular:  negative for chest pain, chest pressure/discomfort, lower extremity edema, palpitations  Gastrointestinal:  negative for abdominal pain, constipation, diarrhea, nausea, vomiting  Neurological:  negative for dizziness, headache    Medications:      Allergies:  No Known Allergies    Current Meds:   Scheduled Meds:    amLODIPine  5 mg Oral Daily    busPIRone  15 mg Oral Q6H    escitalopram  30 mg Oral Daily    carBAMazepine  200 mg Oral TID    ferrous sulfate  325 mg Oral BID    folic acid  1 mg Oral Daily    gabapentin  200 mg Oral TID    hydrOXYzine  50 mg Oral TID    magnesium carbonate  54 mg Oral TID    potassium chloride  20 mEq Oral Daily    propranolol  40 mg Oral BID    rOPINIRole  1 mg Oral Nightly    traZODone  100 mg Oral Nightly    vitamin B-1  100 mg Oral Daily    sodium chloride flush  10 mL Intravenous 2 times per day    famotidine  20 mg Oral BID    enoxaparin  40 mg Subcutaneous Daily    multivitamin  1 tablet Oral Daily    methylPREDNISolone  40 mg Intravenous Q6H    Followed by   Susana Vieira ON 10/15/2020] predniSONE  40 mg Oral Daily    albuterol sulfate HFA  2 puff Inhalation 4x daily    budesonide-formoterol  2 puff Inhalation BID    cefTRIAXone (ROCEPHIN) IV  1 g Intravenous Q24H     Continuous Infusions:    sodium chloride 75 mL/hr at 10/13/20 2005     PRN Meds: sodium chloride flush, acetaminophen **OR** acetaminophen, polyethylene glycol, promethazine **OR** ondansetron, nicotine, LORazepam **OR** LORazepam **OR** LORazepam **OR** LORazepam **OR** LORazepam **OR** LORazepam **OR** LORazepam **OR** LORazepam, albuterol    Data:     Past Medical History:   has a past medical history of Closed fracture of lateral portion of left tibial plateau, COPD (chronic obstructive pulmonary disease) ([address] Depression, Hypertension, and Pain, joint, ankle and foot. Social History:   reports that she has been smoking cigarettes. She has a 30.00 pack-year smoking history. She has never used smokeless tobacco. She reports current alcohol use. She reports current drug use. Drug: Marijuana. Family History:   Family History   Problem Relation Age of Onset    Other Father     Cancer Maternal Grandmother     Heart Disease Paternal Grandmother        Vitals:  /73   Pulse 76   Temp 97.3 °F (36.3 °C) (Oral)   Resp 21   Ht 5' 5\" (1.651 m)   Wt 126 lb 3.2 oz (57.2 kg)   SpO2 93%   BMI 21.00 kg/m²   Temp (24hrs), Av.9 °F (36.6 °C), Min:97.3 °F (36.3 °C), Max:98.6 °F (37 °C)    I/O (24Hr): Intake/Output Summary (Last 24 hours) at 10/14/2020 1456  Last data filed at 10/14/2020 0836  Gross per 24 hour   Intake 10 ml   Output 450 ml   Net -440 ml       Labs:  Hematology:  Recent Labs     10/12/20  2033 10/13/20  0626   WBC 9.1 7.2   RBC 2.90* 2.72*   HGB 9.4* 8.7*   HCT 30.2* 27.9*   .1* 102.6   MCH 32.4 32.0   MCHC 31.1 31.2   RDW 17.7* 17.7*    217   MPV 11.1 11.0     Chemistry:  Recent Labs     10/12/20  2033 10/12/20  2217 10/13/20  0626     --  141   K 3.1*  --  3.9     --  104   CO2 22  --  24   GLUCOSE 136*  --  141*   BUN 6  --  6   CREATININE 0.36*  --  0.24*   MG  --   --  1.8   ANIONGAP 18*  --  13   LABGLOM >60  --  >60   GFRAA >60  --  >60   CALCIUM 7.8*  --  7.6*   PROBNP 425*  --   --    TROPHS 9 9  --      Radiology:  Xr Chest Portable    Result Date: 10/13/2020  Clear chest, stable when compared to previous.      Xr Chest Portable    Result Date: 10/12/2020  Negative chest.       Physical Examination:        General appearance:  alert, cooperative and no distress  Mental Status:  oriented to person, place and time and flat affect  Lungs: Improved bilateral air entry compared to yesterday.  + Wheeze persists   heart:  regular rate and rhythm, no murmur  Abdomen:  soft, nontender, nondistended, normal bowel sounds, no masses, hepatomegaly, splenomegaly  Extremities:  no edema, redness, tenderness in the calves  Skin:  no gross lesions, rashes, induration    Assessment:        Hospital Problems           Last Modified POA    * (Principal) COPD exacerbation ([facility] [address] 10/13/2020 Yes    Essential hypertension 10/13/2020 Yes    Depression 10/13/2020 Yes    Alcohol abuse 10/13/2020 Yes    Paresthesia of lower extremity 10/13/2020 Yes          Plan:      -Continue empiric antibiotics, nasal cannula O2 as needed, prednisone IV steroids.  -Continue home dose of antidepressants antihypertensives and Neurontin. -CIWA scale as needed for alcohol withdrawal.   -DVT/GI prophylaxis. -Anticipate discharge in next 24 hours if continues to improve with respiratory status.     Kavon Vieyra MD  10/14/2020

## 2020-10-14 NOTE — PROGRESS NOTES
Not applicable  Family / Caregiver Present: No  Follows Commands: Within Functional Limits  General Comment  Comments: OT co-eval  Subjective  Subjective: RN and pt agreeable to PT. Pt alert in bed upon arrival.  Pain Screening  Patient Currently in Pain: Yes  Pain Assessment  Pain Assessment: 0-10  Pain Level: 4  Pain Type: Chronic pain  Pain Location: Foot;Leg(knee and distal. foot> leg per pt.)  Non-Pharmaceutical Pain Intervention(s): Ambulation/Increased Activity; Distraction; Emotional support  Response to Pain Intervention: Patient Satisfied  Vital Signs  Patient Currently in Pain: Yes  Pre Treatment Pain Screening  Intervention List: Patient able to continue with treatment    Orientation  Orientation  Overall Orientation Status: Within Functional Limits  Social/Functional History  Social/Functional History  Lives With: Alone  Type of Home: House  Home Layout: One level  Home Access: Stairs to enter without rails(can hold door frame/ other items.  garage entrance)  Entrance Stairs - Number of Steps: 1  Bathroom Shower/Tub: Tub/Shower unit  Bathroom Toilet: Standard(tub and sink on either side.)  Bathroom Equipment: (none)  Bathroom Accessibility: Accessible  ADL Assistance: Independent  Homemaking Assistance: Independent  Homemaking Responsibilities: Yes  Ambulation Assistance: Independent  Transfer Assistance: Independent  Active : No  Patient's  Info: mother or uber  Occupation: (trying to get on disability)  Cognition        Objective          AROM RLE (degrees)  RLE AROM: WFL  AROM LLE (degrees)  LLE AROM : WFL  AROM RUE (degrees)  RUE AROM : WFL  AROM LUE (degrees)  LUE AROM : WFL  Strength RLE  Comment: 4-  Strength LLE  Comment: 4-  Strength RUE  Comment: antigravity, see OT  Strength LUE  Comment: antigravity, see OT     Sensation  Overall Sensation Status: Impaired(Pt reprots chronic numbness/ tingling in feet, sometimes in hands but not as bad)  Bed mobility  Supine to Sit: Stand by assistance  Sit to Supine: (left in chair)  Transfers  Sit to Stand: Contact guard assistance  Stand to sit: Contact guard assistance  Ambulation  Ambulation?: Yes  Ambulation 1  Surface: level tile  Device: Montrell Paulino; No Device(initially no AD then RW used)  Other Apparatus: O2  Assistance: Minimal assistance;Contact guard assistance(initially Luis without AD, then CGA with RW)  Quality of Gait: slowed, small steps, reliance on RW, unsteady, no buckling. Distance: 72'  Comments: limited by fatigues, pt needed to sit in chair, wheeled back to room  Stairs/Curb  Stairs?: No     Balance  Posture: Fair  Sitting - Static: Good  Sitting - Dynamic: Good;-  Standing - Static: Fair;+  Standing - Dynamic: Fair  Comments: RW used while assessing standing balance  Exercises  Comments: toileted, SBA, CGA transfer.      Plan   Plan  Times per week: 5-6x/wk  Current Treatment Recommendations: Strengthening, Functional Mobility Training, Transfer Training, Balance Training, Endurance Training, Gait Training, Stair training, Home Exercise Program, Safety Education & Training, Patient/Caregiver Education & Training, Equipment Evaluation, Education, & procurement  Safety Devices  Type of devices: Call light within reach, Nurse notified, Gait belt, Patient at risk for falls, Left in chair, All fall risk precautions in place, Chair alarm in place  Restraints  Initially in place: No    AM-PAC Score  AM-PAC Inpatient Mobility Raw Score : 17 (10/14/20 1313)  AM-PAC Inpatient T-Scale Score : 42.13 (10/14/20 1313)  Mobility Inpatient CMS 0-100% Score: 50.57 (10/14/20 1313)  Mobility Inpatient CMS G-Code Modifier : CK (10/14/20 1313)          Goals  Short term goals  Time Frame for Short term goals: 14 visits  Short term goal 1: Pt will be Sneha bed mobility  Short term goal 2: Pt will be Sneha transfers  Short term goal 3: Pt will be Sneha amb 300' RW  Short term goal 4: Pt will navigate 2 steps Sneha       Therapy Time   Individual Concurrent Group Co-treatment   Time In 1103         Time Out 1138         Minutes 35         Timed Code Treatment Minutes: 8 Minutes       Leandro Farmer, PT

## 2020-10-14 NOTE — ED NOTES
AMBULATORY PROGRESS NOTE      Patient: Leonardo Rodriguez             MRN: [id number]     SSN: [id number] Body mass index is 25.08 kg/m². YOB: 1952     AGE: 75 y.o. EX: female    PCP: Wilmer Hunt MD       IMPRESSION//  DIAGNOSIS AND TREATMENT PLAN      DIAGNOSES  1. Left Achilles tendinitis    2. Acute left ankle pain        Orders Placed This Encounter    Generic Supply Order     Visco heel insert    Generic Supply Order     Short CAM Boot    [id number] Ankle Min 3V     Order Specific Question:   Weight bearing? Answer: No             PLAN  HPI //  OBJECTIVE EXAMINATION       Leonardo Rodriguez IS A 75 y.o. female who presents to my outpatient office for evaluation of:      She is a 78-year-old female who presents with 5-day onset of worsening left hindfoot pain along Achilles tendon. Left side. The patient denies any recent falls, twists, or trauma. Leonardo Rodriguez can not take NSAID's due to being on Anticoagulation. Leonardo Rodriguez denies: h/o GI bleed, h/o current anticoagulation medication use, or h/o Bariatric weight reduction surgery, or any known hematologic etiology for increased bleeding propensity. No history rheumatoid arthritis lupus psoriasis and or gout. Next no history of rheumatoid arthritis lupus psoriasis or gout. ANKLE/FOOT left    Psychiatry: Alert, Oriented x 3; Speech normal in context and clarity,            Memory intact grossly, no involuntary movements - tremors, no dementia  Gait: slow  Tenderness: moderate tender achilles tendon without deformity palpable:      Central distal achilles spur   Swelling: moderate  Calf tenderness: Absent for calf or gastrocnemius muscle regions   Soft, supple, non tender, non taut lower extremity compartments   NONE to Medial Malleolar, 4/5 Met base midfoot, achilles, tib post, or   NONE to syndesmosis.          no to plantar fascia, or central calcaneal region  Cutaneous: WNL, Joint Motion: WNL   Joint / Tendon Stability: No Pt. Ready to go upstairs.       Mark Jorge RN  10/13/20 2133 Ankle or Subtalar instability or joint laxity. No peroneal sublux ability or dislocation  Alignment: neutral Hindfoot, none Metatarsus Adductus Metatarsus. Neuro Motor/Sensory: NL/NL, Vascular: NL foot/ankle pulses  Lymphatics: No extremity lymphedema, No calf swelling, no tenderness to calf muscles. CHART REVIEW     Pari Mc has been experiencing pain and discomfort confirmed as outlined in the pain assessment outlined below. Pain Assessment  5/18/2020   Location of Pain Foot   Pain Location Comment Heel   Location Modifiers Left   Severity of Pain 8   Quality of Pain Aching; Sharp   Duration of Pain Persistent   Frequency of Pain Constant   Aggravating Factors Walking;Stairs   Aggravating Factors Comment Shoes   Limiting Behavior -   Relieving Factors Rest   Result of Injury -        Pari Mc  has a past medical history of Anal fissure, GERD (gastroesophageal reflux disease), and H/O: hysterectomy (02/12/1998). She also has no past medical history of Difficult intubation, Malignant hyperthermia due to anesthesia, Nausea & vomiting, or Pseudocholinesterase deficiency. Patients is employed at:       No results found for: HBA1C, HGBE8, IGQ3RJHJ     Lab Results   Component Value Date/Time    Glucose 85 02/08/2019 12:49 PM        No results found for: HBA1C, MJZ6JLZT, HGBE8, JPS6ZWJI, JGQ3VIJN      No results found for: VITD3, XQVID2, XQVID3, XQVID, VD3RIA, IIDL18LGODR        Past Medical History:   Diagnosis Date    Anal fissure     GERD (gastroesophageal reflux disease)     H/O: hysterectomy 02/12/1998     Current Outpatient Medications   Medication Sig    alum-mag hydroxide-simeth (MYLANTA) 200-200-20 mg/5 mL susp Take 30 mL by mouth every four (4) hours as needed.  loratadine (CLARITIN) 10 mg tablet Take 10 mg by mouth daily.  ranitidine (ZANTAC) 150 mg tablet Take 150 mg by mouth two (2) times a day.     sucralfate (CARAFATE) 1 gram tablet Take 1 g by mouth four (4) times daily.  diclofenac (VOLTAREN) 1 % gel Apply 4 g to affected area two (2) times a day. Apply to affected lateral ankle twice daily    calcium carbonate (TUMS) 200 mg calcium (500 mg) chew Take 1 Tab by mouth daily. No current facility-administered medications for this visit. THE  FOR Wesley Burleson MD 5/18/2020 . Allergies   Allergen Reactions    Penicillin G Rash     Past Surgical History:   Procedure Laterality Date    BREAST SURGERY PROCEDURE UNLISTED      reduction    HX ENDOSCOPY      HX GI      colonoscopy, EGD    HX GI      ANAL FISSURE    HX GYN      hysterectomy    HX HYSTERECTOMY  02/12/1998     Social History     Occupational History    Not on file   Tobacco Use    Smoking status: Never Smoker    Smokeless tobacco: Never Used   Substance and Sexual Activity    Alcohol use: No     Alcohol/week: 0.0 standard drinks    Drug use: No    Sexual activity: Not on file     Family History   Problem Relation Age of Onset    Diabetes Father     Diabetes Sister     Hypertension Mother     Stroke Mother     Hypertension Sister         REVIEW OF SYSTEMS : 5/18/2020  ALL BELOW ARE Negative except : SEE HPI     Constitutional: Negative for fever, chills and weight loss. Neg Weight Loss  Cardiovascular: Negative for chest pain, claudication and leg swelling. SOB, CALDERA   Gastrointestinal/Urological: Negative for pain, N/V/D/C, Blood in stool or urine,dysuria,  Hematuria, Incontinence  Musculoskeletal: see HPI. Neurological: Negative for dizziness and weakness, headaches,Visual Changes or   Confusion, or Seizures,   Psychiatric/Behavioral: Negative for depression, memory loss and substance abuse. Extremities:  Negative for hair changes, rash or skin lesion changes. Hematologic: Negative for Bleeding problems, bruising, pallor or swollen lymph nodes.   Peripheral Vascular: No calf pain, vascular vein tenderness to calf pain No calf throbbing, posterior knee throbbing pain     DIAGNOSTIC IMAGING      ANKLE X RAYS 3 VIEWS Left   X RAYS AT [facility] [address] [place] [address]  5/18/2020    FINDINGS:     SOFT TISSUES:              Absent soft tissue swelling, No soft tissue calcifications, No Calcified blood vessels     Soft tissue swelling location:    None to fibula region        None medial aspect    None dorsal midfoot/forefoot  No radiopaque foreign body, abnormal lucency, or focal swelling noted within soft tissues. OSSEOUS:     No fractures, subluxations, dislocations   Bone spur to inferior aspect of calcaneus is  moderate   Bone spur to superior calcaneus region is           moderate SIZED CALCIFIC INSERTION AL BONE SPUR IS PRESENT   Mineralization: suggests  Osteopenia      ALIGNMENT:    Ankle mortise alignment is congruent. Tibial plafond and talar dome intact      No Osteochondral defects seen    No Ankle joint effusion seen         I have personally reviewed these images of the above study. The interpretation of this study is my professional opinion. Jhony Vera MD  5/18/2020  4:20 PM        Please see above section of this report. I have personally reviewed the results of the above study. The interpretation of this study is my professional opinion.       Jhony Vera MD  5/18/2020  4:14 PM

## 2020-10-14 NOTE — CARE COORDINATION
Consult received this date for consideration of rehab. Reviewed chart. Met with pt this date was very pleasant and cooperative. Pt admits to drinking 4 shots of Rum daily. Denies any drug use. Prior rehab at [facility] in [place], [place] 2019 and outpt treatment at [facility]. Also went to [place], however was not pleased with their program.  Pt states she goes to [facility] very now and then. Last meetings was a couple of weeks ago. Pt states her mother is making rehab arrangements and has contacted a facility. Pt unsure of facility name. Pt states she is only interested in outpt treatment. Writer provided additional alcohol treatment resources . Pt states she knows what she has to do. Insurance is [employer].

## 2020-10-14 NOTE — PROGRESS NOTES
Physician Progress Note      Janet Shelton  CSN #:                  [id number]  :                       1970  ADMIT DATE:       10/12/2020 7:48 PM  [address] [place] DATE:  RESPONDING  PROVIDER #:        Stacy Clark MD          QUERY TEXT:    Pt admitted with AECOPD. Pt noted to have hypoxia. If possible, please   document in the progress notes and discharge summary if you are evaluating   and/or treating any of the following: The medical record reflects the following:  Risk Factors: COPD  Clinical Indicators: initial sats in 80's, dyspnea with RR in the 20's, per   nursing flowsheet pt still having desats into 80's on O2, pt is not on home   oxygen  Treatment: IV Solumedrol, nebs, inhalers, Rocephin, continuous oxygen at   4L/min    Call if any questions. Thank you, Elizabeth Bustos, [facility] [phone]  Options provided:  -- Acute respiratory failure with hypoxia  -- Hypoxia only  -- Other - I will add my own diagnosis  -- Disagree - Not applicable / Not valid  -- Disagree - Clinically unable to determine / Unknown  -- Refer to Clinical Documentation Reviewer    PROVIDER RESPONSE TEXT:    This patient is in acute respiratory failure with hypoxia.     Query created by: Yasmine Le on 10/14/2020 8:35 AM      Electronically signed by:  Stacy Clark MD 10/14/2020 3:24 PM

## 2020-10-14 NOTE — ED NOTES
ED to inpatient nurses report    Chief Complaint   Patient presents with    Shortness of Breath      Present to ED from PCP d/t decreased SpO2  LOC: pt. A&Ox4, tired  Vital signs   Vitals:    10/13/20 1636 10/13/20 1700 10/13/20 1800 10/13/20 1900   BP:  (!) 123/91 (!) 127/98 129/88   Pulse:  95 98 96   Resp:       Temp:       TempSrc:       SpO2: 96% 96% 91%    Weight:       Height:          Oxygen Baseline: on arrival pt. Was 82% room air, pt. Is currently 95% on 2L      Current needs required Night time meds   LDAs:   Peripheral IV 10/12/20 Right Antecubital (Active)     Mobility: can ambulate to restroom  Pending ED orders: n/a  Present condition: pt. Sleeping in bed, NAD noted, resp. Even and non-labored   Code Status:Full  [unfilled]   Consults:  []  Hospitalist  Completed  [x] yes [] no  []  Medicine  Completed  [] yes [] No  []  Cardiology  Completed  [] yes [] No  []  GI   Completed  [] yes [] No  []  Neurology  Completed  [] yes [] No  []  Nephrology Completed  [] yes [] No  []  Vascular  Completed  [] yes [] No   []  Surgery  Completed  [] yes [] No   []  Urology  Completed  [] yes [] No   []  Plastics  Completed  [] yes [] No   []  ENT  Completed  [] yes [] No   []  Other social work   Completed  [] yes [x] No  Pertinent event(s) Pt.  Is a CIWA pt., still awaiting Buspar from pharmacy   Pertinent event(s)   Electronically signed by Hero Alvarez RN on 10/13/2020 at 8:35 PM         Hero Alvarez, [address] [place] [address]  10/13/20 2047

## 2020-10-14 NOTE — PROGRESS NOTES
Occupational Therapy   Occupational Therapy Initial Assessment  Date: 10/14/2020   Patient Name: Maxine Blum  MRN: [id number]     : 1970    Date of Service: 10/14/2020    Discharge Recommendations:  Patient would benefit from continued therapy after discharge       Assessment   Performance deficits / Impairments: Decreased functional mobility ; Decreased ADL status; Decreased safe awareness;Decreased high-level IADLs;Decreased balance;Decreased endurance  Assessment: Pt agreeable to OT eval this date. Pt completed functional transfers/mobility with Min A and RW use this date. Pt demo decreased balance and endurance throughout functional mobility. Pt completed toileting task with Min A, requiring assistance with transfers, LB dressing, and pericare. Pt to require continued OT to increase safety and independence with ADLs/IADLs and functional mobility. Prognosis: Good  Decision Making: Medium Complexity  Patient Education: Pt educated on OT role, OT POC, safety awareness, EC/WS, and importance of continued OT. Pt verbalized good understanding  REQUIRES OT FOLLOW UP: Yes  Activity Tolerance  Activity Tolerance: Patient Tolerated treatment well  Safety Devices  Safety Devices in place: Yes  Type of devices: Nurse notified; Left in chair;Gait belt; Chair alarm in place;Call light within reach  Restraints  Initially in place: No           Patient Diagnosis(es): The primary encounter diagnosis was COPD exacerbation ([address] Diagnoses of Hypoxia and Acute alcoholic intoxication without complication ([address] were also pertinent to this visit. has a past medical history of Closed fracture of lateral portion of left tibial plateau, COPD (chronic obstructive pulmonary disease) ([address] Depression, Hypertension, and Pain, joint, ankle and foot. has a past surgical history that includes Hysterectomy (); Brain tumor excision (); fracture surgery (Left, 7-3-13); and fracture surgery (Right). Restrictions  Restrictions/Precautions  Restrictions/Precautions: General Precautions, Fall Risk, Up as Tolerated, Seizure  Required Braces or Orthoses?: No    Subjective   General  Patient assessed for rehabilitation services?: Yes  Family / Caregiver Present: No  Patient Currently in Pain: Yes  Pain Assessment  Pain Assessment: 0-10  Pain Level: 4  Pain Type: Chronic pain  Pain Location: Foot;Leg  Pain Orientation: Right;Left  Pain Descriptors: Aching;Discomfort;Tingling  Clinical Progression: Not changed  Functional Pain Assessment: Activities are not prevented  Response to Pain Intervention: Patient Satisfied  Pre Treatment Pain Screening  Intervention List: Patient able to continue with treatment  Vital Signs  Level of Consciousness: Alert  Patient Currently in Pain: Yes     Social/Functional History  Social/Functional History  Lives With: Alone  Type of Home: House  Home Layout: One level  Home Access: Stairs to enter without rails(can hold door frame/ other items.  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 V's prior       Objective   Vision: Impaired  Vision Exceptions: Wears glasses at all times  Hearing: Within functional limits         Balance  Sitting Balance: Stand by assistance  Standing Balance: Contact guard assistance  Standing Balance  Time: ~5 minutes  Activity: toileting tasks; hand hygiene; standing rest breaks  Functional Mobility  Functional - Mobility Device: Rolling Walker  Activity: Other  Assist Level: Flex: 4-/5  L Shoulder Ext: 4-/5  L Elbow Flex: 4/5  L Elbow Ext: 4/5  L Hand General: 4/5  RUE Strength  Gross RUE Strength: Exceptions to [facility]  R Shoulder Flex: 4-/5  R Shoulder Ext: 4-/5  R Elbow Flex: 4/5  R Elbow Ext: 4/5  R Hand General: 4/5                   Plan   Plan  Times per week: 3-5 x/wk  Current Treatment Recommendations: Balance Training, Functional Mobility Training, Endurance Training, Safety Education & Training, Self-Care / ADL, Equipment Evaluation, Education, & procurement, Patient/Caregiver Education & Training    AM-PAC Score        AM-PAC Inpatient Daily Activity Raw Score: 19 (10/14/20 1630)  AM-PAC Inpatient ADL T-Scale Score : 40.22 (10/14/20 1630)  ADL Inpatient CMS 0-100% Score: 42.8 (10/14/20 1630)  ADL Inpatient CMS G-Code Modifier : CK (10/14/20 1630)    Goals  Short term goals  Time Frame for Short term goals: By discharge, pt will:  Short term goal 1: Demo Mod I with use of LRD for functional transfers and functional mobility  Short term goal 2: Demo I with setup for UB ADLs and grooming tasks  Short term goal 3: Demo I with setup for LB ADLs and toileting tasks  Short term goal 4: Demo 10+ minutes dynamic standing task to increase safety and independence with ADLs/IADLs  Short term goal 5: Demo good safety awareness throughout therapy session with <4 VCs       Therapy Time   Individual Concurrent Group Co-treatment   Time In 1103         Time Out 1141         Minutes 38         Timed Code Treatment Minutes: 24 Minutes       Paul Amaro OTR/L

## 2020-10-14 NOTE — PROGRESS NOTES
Patient arrived to unit at [phone] is resting comfortably in bed watching TV. Will continue to monitor.

## 2020-10-15 PROBLEM — F10.930 ALCOHOL WITHDRAWAL SYNDROME WITHOUT COMPLICATION (HCC): Status: ACTIVE | Noted: 2020-10-13

## 2020-10-15 LAB
ABSOLUTE EOS #: 0 K/UL (ref 0–0.44)
ABSOLUTE IMMATURE GRANULOCYTE: 0.09 K/UL (ref 0–0.3)
ABSOLUTE LYMPH #: 1.64 K/UL (ref 1.1–3.7)
ABSOLUTE MONO #: 0.73 K/UL (ref 0.1–1.2)
ANION GAP SERPL CALCULATED.3IONS-SCNC: 8 MMOL/L (ref 9–17)
BASOPHILS # BLD: 0 % (ref 0–2)
BASOPHILS ABSOLUTE: 0 K/UL (ref 0–0.2)
BUN BLDV-MCNC: 13 MG/DL (ref 6–20)
BUN/CREAT BLD: ABNORMAL (ref 9–20)
CALCIUM SERPL-MCNC: 8.3 MG/DL (ref 8.6–10.4)
CHLORIDE BLD-SCNC: 103 MMOL/L (ref 98–107)
CO2: 25 MMOL/L (ref 20–31)
CREAT SERPL-MCNC: 0.28 MG/DL (ref 0.5–0.9)
DIFFERENTIAL TYPE: ABNORMAL
EOSINOPHILS RELATIVE PERCENT: 0 % (ref 1–4)
GFR AFRICAN AMERICAN: >60 ML/MIN
GFR NON-AFRICAN AMERICAN: >60 ML/MIN
GFR SERPL CREATININE-BSD FRML MDRD: ABNORMAL ML/MIN/{1.73_M2}
GFR SERPL CREATININE-BSD FRML MDRD: ABNORMAL ML/MIN/{1.73_M2}
GLUCOSE BLD-MCNC: 106 MG/DL (ref 70–99)
HCT VFR BLD CALC: 30.4 % (ref 36.3–47.1)
HEMOGLOBIN: 9 G/DL (ref 11.9–15.1)
IMMATURE GRANULOCYTES: 1 %
LYMPHOCYTES # BLD: 18 % (ref 24–43)
MCH RBC QN AUTO: 32.6 PG (ref 25.2–33.5)
MCHC RBC AUTO-ENTMCNC: 29.6 G/DL (ref 28.4–34.8)
MCV RBC AUTO: 110.1 FL (ref 82.6–102.9)
MONOCYTES # BLD: 8 % (ref 3–12)
MORPHOLOGY: ABNORMAL
MORPHOLOGY: ABNORMAL
NRBC AUTOMATED: 0 PER 100 WBC
PDW BLD-RTO: 17.1 % (ref 11.8–14.4)
PLATELET # BLD: 234 K/UL (ref 138–453)
PLATELET ESTIMATE: ABNORMAL
PMV BLD AUTO: 11.8 FL (ref 8.1–13.5)
POTASSIUM SERPL-SCNC: 5.1 MMOL/L (ref 3.7–5.3)
RBC # BLD: 2.76 M/UL (ref 3.95–5.11)
RBC # BLD: ABNORMAL 10*6/UL
SEG NEUTROPHILS: 73 % (ref 36–65)
SEGMENTED NEUTROPHILS ABSOLUTE COUNT: 6.64 K/UL (ref 1.5–8.1)
SODIUM BLD-SCNC: 136 MMOL/L (ref 135–144)
WBC # BLD: 9.1 K/UL (ref 3.5–11.3)
WBC # BLD: ABNORMAL 10*3/UL

## 2020-10-15 PROCEDURE — 85025 COMPLETE CBC W/AUTO DIFF WBC: CPT

## 2020-10-15 PROCEDURE — 6370000000 HC RX 637 (ALT 250 FOR IP): Performed by: INTERNAL MEDICINE

## 2020-10-15 PROCEDURE — 99232 SBSQ HOSP IP/OBS MODERATE 35: CPT | Performed by: INTERNAL MEDICINE

## 2020-10-15 PROCEDURE — 6360000002 HC RX W HCPCS: Performed by: INTERNAL MEDICINE

## 2020-10-15 PROCEDURE — 6370000000 HC RX 637 (ALT 250 FOR IP): Performed by: NURSE PRACTITIONER

## 2020-10-15 PROCEDURE — 94761 N-INVAS EAR/PLS OXIMETRY MLT: CPT

## 2020-10-15 PROCEDURE — 94640 AIRWAY INHALATION TREATMENT: CPT

## 2020-10-15 PROCEDURE — 6360000002 HC RX W HCPCS: Performed by: NURSE PRACTITIONER

## 2020-10-15 PROCEDURE — 2580000003 HC RX 258: Performed by: NURSE PRACTITIONER

## 2020-10-15 PROCEDURE — 36415 COLL VENOUS BLD VENIPUNCTURE: CPT

## 2020-10-15 PROCEDURE — 80048 BASIC METABOLIC PNL TOTAL CA: CPT

## 2020-10-15 PROCEDURE — 2060000000 HC ICU INTERMEDIATE R&B

## 2020-10-15 PROCEDURE — 2700000000 HC OXYGEN THERAPY PER DAY

## 2020-10-15 PROCEDURE — 2580000003 HC RX 258: Performed by: INTERNAL MEDICINE

## 2020-10-15 RX ORDER — IPRATROPIUM BROMIDE AND ALBUTEROL SULFATE 2.5; .5 MG/3ML; MG/3ML
1 SOLUTION RESPIRATORY (INHALATION)
Status: DISCONTINUED | OUTPATIENT
Start: 2020-10-15 | End: 2020-10-26 | Stop reason: HOSPADM

## 2020-10-15 RX ADMIN — HYDROXYZINE HYDROCHLORIDE 50 MG: 25 TABLET, FILM COATED ORAL at 14:24

## 2020-10-15 RX ADMIN — CARBAMAZEPINE 200 MG: 100 TABLET, EXTENDED RELEASE ORAL at 20:41

## 2020-10-15 RX ADMIN — AMLODIPINE BESYLATE 5 MG: 10 TABLET ORAL at 10:42

## 2020-10-15 RX ADMIN — ROPINIROLE HYDROCHLORIDE 1 MG: 1 TABLET, FILM COATED ORAL at 20:41

## 2020-10-15 RX ADMIN — LORAZEPAM 2 MG: 2 INJECTION INTRAMUSCULAR; INTRAVENOUS at 23:39

## 2020-10-15 RX ADMIN — PREDNISONE 40 MG: 20 TABLET ORAL at 10:43

## 2020-10-15 RX ADMIN — TRAZODONE HYDROCHLORIDE 100 MG: 100 TABLET ORAL at 20:41

## 2020-10-15 RX ADMIN — FERROUS SULFATE TAB EC 325 MG (65 MG FE EQUIVALENT) 325 MG: 325 (65 FE) TABLET DELAYED RESPONSE at 10:48

## 2020-10-15 RX ADMIN — CARBAMAZEPINE 200 MG: 100 TABLET, EXTENDED RELEASE ORAL at 14:24

## 2020-10-15 RX ADMIN — SODIUM CHLORIDE, PRESERVATIVE FREE 10 ML: 5 INJECTION INTRAVENOUS at 20:43

## 2020-10-15 RX ADMIN — SODIUM CHLORIDE: 9 INJECTION, SOLUTION INTRAVENOUS at 23:41

## 2020-10-15 RX ADMIN — BUSPIRONE HYDROCHLORIDE 15 MG: 15 TABLET ORAL at 14:24

## 2020-10-15 RX ADMIN — PROPRANOLOL HYDROCHLORIDE 40 MG: 40 TABLET ORAL at 10:53

## 2020-10-15 RX ADMIN — GABAPENTIN 200 MG: 100 CAPSULE ORAL at 14:24

## 2020-10-15 RX ADMIN — BUDESONIDE AND FORMOTEROL FUMARATE DIHYDRATE 2 PUFF: 160; 4.5 AEROSOL RESPIRATORY (INHALATION) at 21:14

## 2020-10-15 RX ADMIN — FERROUS SULFATE TAB EC 325 MG (65 MG FE EQUIVALENT) 325 MG: 325 (65 FE) TABLET DELAYED RESPONSE at 20:41

## 2020-10-15 RX ADMIN — FAMOTIDINE 20 MG: 20 TABLET ORAL at 10:48

## 2020-10-15 RX ADMIN — BUSPIRONE HYDROCHLORIDE 15 MG: 15 TABLET ORAL at 20:42

## 2020-10-15 RX ADMIN — PROPRANOLOL HYDROCHLORIDE 40 MG: 40 TABLET ORAL at 20:42

## 2020-10-15 RX ADMIN — IPRATROPIUM BROMIDE AND ALBUTEROL SULFATE 1 AMPULE: .5; 3 SOLUTION RESPIRATORY (INHALATION) at 09:09

## 2020-10-15 RX ADMIN — METHYLPREDNISOLONE SODIUM SUCCINATE 40 MG: 40 INJECTION, POWDER, FOR SOLUTION INTRAMUSCULAR; INTRAVENOUS at 00:03

## 2020-10-15 RX ADMIN — HYDROXYZINE HYDROCHLORIDE 50 MG: 25 TABLET, FILM COATED ORAL at 10:48

## 2020-10-15 RX ADMIN — FOLIC ACID 1 MG: 1 TABLET ORAL at 10:43

## 2020-10-15 RX ADMIN — GABAPENTIN 200 MG: 100 CAPSULE ORAL at 10:49

## 2020-10-15 RX ADMIN — PIPERACILLIN AND TAZOBACTAM 3.38 G: 3; .375 INJECTION, POWDER, FOR SOLUTION INTRAVENOUS at 14:32

## 2020-10-15 RX ADMIN — ESCITALOPRAM OXALATE 30 MG: 10 TABLET ORAL at 10:48

## 2020-10-15 RX ADMIN — FAMOTIDINE 20 MG: 20 TABLET ORAL at 20:41

## 2020-10-15 RX ADMIN — Medication 54 MG: at 14:24

## 2020-10-15 RX ADMIN — HYDROXYZINE HYDROCHLORIDE 50 MG: 25 TABLET, FILM COATED ORAL at 20:41

## 2020-10-15 RX ADMIN — PIPERACILLIN AND TAZOBACTAM 3.38 G: 3; .375 INJECTION, POWDER, FOR SOLUTION INTRAVENOUS at 20:00

## 2020-10-15 RX ADMIN — Medication 54 MG: at 20:41

## 2020-10-15 RX ADMIN — Medication 54 MG: at 10:49

## 2020-10-15 RX ADMIN — Medication 100 MG: at 10:56

## 2020-10-15 RX ADMIN — POTASSIUM CHLORIDE 20 MEQ: 1500 TABLET, EXTENDED RELEASE ORAL at 10:48

## 2020-10-15 RX ADMIN — IPRATROPIUM BROMIDE AND ALBUTEROL SULFATE 1 AMPULE: .5; 3 SOLUTION RESPIRATORY (INHALATION) at 12:54

## 2020-10-15 RX ADMIN — GABAPENTIN 200 MG: 100 CAPSULE ORAL at 20:41

## 2020-10-15 RX ADMIN — ENOXAPARIN SODIUM 40 MG: 40 INJECTION SUBCUTANEOUS at 10:43

## 2020-10-15 RX ADMIN — SODIUM CHLORIDE: 9 INJECTION, SOLUTION INTRAVENOUS at 11:10

## 2020-10-15 RX ADMIN — CARBAMAZEPINE 200 MG: 100 TABLET, EXTENDED RELEASE ORAL at 10:43

## 2020-10-15 RX ADMIN — IPRATROPIUM BROMIDE AND ALBUTEROL SULFATE 1 AMPULE: .5; 3 SOLUTION RESPIRATORY (INHALATION) at 21:14

## 2020-10-15 RX ADMIN — THERA TABS 1 TABLET: TAB at 10:43

## 2020-10-15 RX ADMIN — BUSPIRONE HYDROCHLORIDE 15 MG: 15 TABLET ORAL at 10:44

## 2020-10-15 RX ADMIN — IPRATROPIUM BROMIDE AND ALBUTEROL SULFATE 1 AMPULE: .5; 3 SOLUTION RESPIRATORY (INHALATION) at 16:26

## 2020-10-15 RX ADMIN — BUDESONIDE AND FORMOTEROL FUMARATE DIHYDRATE 2 PUFF: 160; 4.5 AEROSOL RESPIRATORY (INHALATION) at 09:09

## 2020-10-15 RX ADMIN — SODIUM CHLORIDE, PRESERVATIVE FREE 10 ML: 5 INJECTION INTRAVENOUS at 10:49

## 2020-10-15 RX ADMIN — BUSPIRONE HYDROCHLORIDE 15 MG: 15 TABLET ORAL at 03:09

## 2020-10-15 ASSESSMENT — PAIN SCALES - GENERAL
PAINLEVEL_OUTOF10: 0

## 2020-10-15 ASSESSMENT — PAIN DESCRIPTION - PROGRESSION
CLINICAL_PROGRESSION: NOT CHANGED

## 2020-10-15 NOTE — PLAN OF CARE
[facility] Sewell[facility] COPD exacerbation ([facility] [J44.1] . Vitals:    10/15/20 0005   BP: 134/87   Pulse: 77   Resp: 16   Temp: 98.6 °F (37 °C)   SpO2:    . Patients home meds are   Prior to Admission medications    Medication Sig Start Date End Date Taking?  Authorizing Provider   tiotropium (SPIRIVA RESPIMAT) 1.25 MCG/ACT AERS inhaler Inhale 2 puffs into the lungs daily 10/12/20   Ludivina Leo MD   tiZANidine (ZANAFLEX) 4 MG tablet Take 1 tablet by mouth every 8 hours as needed (back pain) 10/12/20   Ludivina Leo MD   potassium chloride (KLOR-CON M) 20 MEQ extended release tablet Take 1 tablet by mouth daily 9/10/20   Alycia Ryan APRN - CNP   magnesium carbonate (MAGONATE) 54 (Mag Equiv) MG/5ML LIQD oral liquid Take 5 mLs by mouth 3 times daily 7/16/20   Ludivina Leo MD   ferrous sulfate (IRON 325) 325 (65 Fe) MG tablet Take 1 tablet by mouth 2 times daily 7/16/20   Ludivina Leo MD   rOPINIRole (REQUIP) 1 MG tablet Take 1 tablet by mouth nightly 7/8/20   Ludivina Leo MD   ACETAMINOPHEN EXTRA STRENGTH 500 MG tablet Take 500 mg by mouth 3 times daily as needed 5/8/20   Historical Provider, MD   ibuprofen (ADVIL;MOTRIN) 800 MG tablet Take 800 mg by mouth 3 times daily as needed 5/15/20   Historical Provider, MD   propranolol (INDERAL) 40 MG tablet Take 40 mg by mouth 2 times daily 5/12/20   Historical Provider, MD   albuterol sulfate HFA (VENTOLIN HFA) 108 (90 Base) MCG/ACT inhaler Inhale 2 puffs into the lungs 4 times daily as needed for Wheezing 6/11/20   Ludivina Leo MD   busPIRone (BUSPAR) 15 MG tablet Take 15 mg by mouth every 6 hours 6/11/20   Ludivina Leo MD   escitalopram (LEXAPRO) 20 MG tablet Take 1.5 tablets by mouth daily 6/11/20   Ludivina Leo MD   traZODone (DESYREL) 100 MG tablet Take 1 tablet by mouth nightly 6/11/20   Ludivina Leo MD   carBAMazepine (TEGRETOL XR) 200 MG extended release tablet Take 1 tablet by mouth 3 times daily 6/11/20   Ludivina Payne MD   amLODIPine (NORVASC) 5 MG tablet Take 1 tablet by mouth daily 6/11/20   Ludivina Payne MD   gabapentin (NEURONTIN) 100 MG capsule Take 2 capsules by mouth 3 times daily for 180 days.  Intended supply: 90 days 6/11/20 12/8/20  Ludivina Payne MD   hydrOXYzine (ATARAX) 50 MG tablet Take 1 tablet by mouth 3 times daily 6/11/20   Ludivina Payne MD   acamprosate (CAMPRAL) 333 MG tablet Take 2 tablets by mouth 3 times daily 12/10/19 10/12/20  LOI Knutson NP   vitamin B-1 (THIAMINE) 100 MG tablet Take 1 tablet by mouth daily 7/12/19   Ludivina Payne MD   vitamin D (ERGOCALCIFEROL) 86466 units CAPS capsule Take 1 capsule by mouth once a week 6/19/19   Ludivina Payne MD   folic acid (Damien Morales) 1 MG tablet Take 1 tablet by mouth daily 6/19/19   Ludivina Payne MD     Assessment:  CC- SOB  Hx- COPD, heavy smoker, ETOH abuse (came to ED intoxicated)  Dx- COPD exacerbation  No home O2  Home meds include Spiriva QID and Albuterol PRN    RR 25  Breath Sounds: Rhonchi, wet, diminished bases      Bronchodilator assessment at level 3  Hyperinflation assessment at level   Secretion Management assessment at level      [x]    Bronchodilator Assessment  BRONCHODILATOR ASSESSMENT SCORE  Score 0 1 2 3 4 5   Breath Sounds   []  Patient Baseline []  No Wheeze good aeration []  Faint, scattered wheezing, good aeration [x]  Expiratory Wheezing and or moderately diminished []  Insp/Exp wheeze and/or very diminished []  Insp/Exp and/ or marked distress   Respiratory Rate   []  Patient Baseline []  Less than 20 []  Less than 20 [x]  20-25 []  Greater than 25 []  Greater than 25   Peak flow % of Pred or PB [x]  NA   []  Greater than 90%  []  81-90% []  71-80% []  Less than or equal to 70%  or unable to perform []  Unable due to Respiratory Distress   Dyspnea re []  Patient Baseline []  No SOB []  No SOB []  SOB on exertion [x]  SOB min activity []  At rest/acute   e FEV% Predicted       [x]  NA []  Above 69%  []  Unable []  Above 60-69%  []  Unable []  Above 50-59%  []  Unable []  Above 35-49%  []  Unable []  Less than 35%  []  Unable                 []  Hyperinflation Assessment  Score 1 2 3   CXR and Breath Sounds   []  Clear []  No atelectasis  Basilar aeration []  Atelectasis or absent basilar breath sounds   Incentive Spirometry Volume  (Per IBW)   []  Greater than or equal to 15ml/Kg []  less than 15ml/Kg []  less than 15ml/Kg   Surgery within last 2 weeks []  None or general   []  Abdominal or thoracic surgery  []  Abdominal or thoracic   Chronic Pulmonary Historyre []  No []  Yes []  Yes     []  Secretion Management Assessment  Score 1 2 3   Bilateral Breath Sounds   []  Occasional Rhonchi []  Scattered Rhonchi []  Course Rhonchi and/or poor aeration   Sputum    []  Small amount of thin secretions []  Moderate amount of viscous secretions []  Copius, Viscious Yellow/ Secretions   CXR as reported by physician []  clear  []  Unavailable []  Infiltrates and/or consolidation  []  Unavailable []  Mucus Plugging and or lobar consolidation  []  Unavailable   Cough []  Strong, productive cough []  Weak productive cough []  No cough or weak non-productive cough   Sena Grigsby  3:32 AM                            FEMALE                                  MALE                            FEV1 Predicted Normal Values                        FEV1 Predicted Normal Values          Age                                     Height in Feet and Inches       Age                                     Height in Feet and Inches       4' 11\" 5' 1\" 5' 3\" 5' 5\" 5' 7\" 5' 9\" 5' 11\" 6' 1\"  4' 11\" 5' 1\" 5' 3\" 5' 5\" 5' 7\" 5' 9\" 5' 11\" 6' 1\"   42 - 45 2.49 2.66 2.84 3.03 3.22 3.42 3.62 3.83 42 - 45 2.82 3.03 3.26 3.49 3.72 3.96 4.22 4.47   46 - 49 2.40 2.57 2.76 2.94 3.14 3.33 3.54 3.75 46 - 49 2.70 2.92 3.14 3.37 3.61 3.85 4.10 4.36   50 - 53 2.31 2.48 2.66 2.85 3.04 3.24 3.45 3.66 50 - 53 2.58 2.80 3.02 3.25 3. 49 3.73 3.98 4.24   54 - 57 2.21 2.38 2.57 2.75 2.95 3.14 3.35 3.56 54 - 57 2.46 2.67 2.89 3.12 3.36 3.60 3.85 4.11   58 - 61 2.10 2.28 2.46 2.65 2.84 3.04 3.24 3.45 58 - 61 2.32 2.54 2.76 2.99 3.23 3.47 3.72 3.98   62 - 65 1.99 2.17 2.35 2.54 2.73 2.93 3.13 3.34 62 - 65 2.19 2.40 2.62 2.85 3.09 3.33 3.58 3.84   66 - 69 1.88 2.05 2.23 2.42 2.61 2.81 3.02 3.23 66 - 69 2.04 2.26 2.48 2.71 2.95 3.19 3.44 3.70   70+ 1.82 1.99 2.17 2.36 2.55 2.75 2.95 3.16 70+ 1.97 2.19 2.41 2.64 2.87 3.12 3.37 3.62             Predicted Peak Expiratory Flow Rate                                       Height (in)  Female       Height (in) Male           Age 64 63 56 57 57 66 78 70 Age            21 344 357 372 387 402 417 432 446  60 62 64 66 68 70 72 74 76   25 337 352 366 381 396 411 426 441 25 447 476 [phone] [phone]   30 329 344 359 374 389 404 419 434 30 437 466 494 523 [phone] 667   35 322 337 351 366 381 396 411 426 35 426 455 484 512 541 [phone]   40 314 329 344 359 374 389 404 419 40 416 445 473 502 531 559 588 617 647   45 307 322 336 351 366 381 396 411 45 405 434 463 491 520 549 577 606 636   50 299 314 329 344 359 374 389 404 50 395 424 452 481 510 538 567 596 625   55 292 307 321 336 351 366 381 396 55 384 413 442 470 499 528 556 585 615   60 284 299 314 329 344 359 374 389 60 374 403 431 460 489 517 546 575 605   65 277 292 306 321 336 351 366 381 65 363 392 421 449 478 507 535 564 594   70 269 284 299 314 329 344 359 374 70 353 382 410 439 468 496 525 554 583   75 261 274 289 305 319 334 348 364 75 344 372 400 429 458 487 515 544 573   80 253 266 282 296 312 327 342 356 80 335 362 390 419 448 476 [phone]

## 2020-10-15 NOTE — PROGRESS NOTES
Writer received a call from the patient's mother Alisa Cherry expressed her concern for the patient's safety and ability to care for herself. Nick beckett states that the patient suffers from anxiety, PTSD, ETOH abuse, and depression. West allis also states that the patient is also not capable of her ADL's at this time.  Writer reached out to CM and the patient also has a consult for social work in as well

## 2020-10-15 NOTE — PROGRESS NOTES
[facility]  Office: [address], DO, Angela Wilmer, DO, Marion Montanez, DO, Rigo Huang, DO, Karson Donovan MD, Lay Marques MD, Lizbeth Aguilar MD, Miguel Barnes MD, Gilberto Worthington MD, Herminia Hamilton MD, Gerson Pitts MD, Melissa Smith MD, Mary Orozco MD, Beto Villela, DO, Nicky Gracia MD, Gilberto Martin MD, Isiah Lim, DO, Jamil William MD,  Angela Xavier DO, Asim Smith MD, Lane Liriano MD, [place]al Lisa, [facility], [address], CNP, Constance Mcgarry, [facility], Bailey Cavazos, CNS, Aleida Mcclain, CNP, Curtis Herrera, CNP, Rigo Lagunas, CNP, Tavo Fleming, CNP, Nikolas Parents, CNP, Kristina Lane PA-C, Lila Santiago, [facility], Celso Alejandre, CNP, Hugo Phoenix, CNP, Tyra Gutiérrez, CNP, Consuelo Aguilar, CNP, Jacklyn Frankel, [address] [place] [address]    Progress Note    Name:   Magda Knutson  MRN:     [id number]     Acct:      [de-identified]   Room:   2003/2003-01   Day:  3  Admit Date:  10/12/2020  7:48 PM    PCP:   Abdirahman Richards MD  Code Status:  Full Code    Subjective:     C/C:   Chief Complaint   Patient presents with    Shortness of Breath     Interval History Status: improved. Patient sitting in bed, off telemetry' trying to get to bath room'. Was confused last night, trying to get out of bed, removing O2 with desaturations prompting Telesitter placement in room. Afebrile, hemodynamically stable. Maintaining O2 sats on 4 L nasal cannula oxygen. Brief History:   56-year-old brought in by family after being noted to be hypoxic at her PCPs office. Patient does complain of productive cough, bilateral lower extremity painx 2-3 months but worse in the last month. Chronic unchanged shortness of breath going on for more than a year. Has been using albuterol inhaler as needed. Continues to smoke about 1 pack/day. Drinks alcohol every day: Rum.   Known history of hypertension, depression, COPD, excision of Astrocytoma. - ED evaluation showed patient was afebrile, hypoxic to 84% on room air, intoxicated with alcohol level 244, anemic hemoglobin 9.4, COVID rapid NAAT:  Negative, chest x-ray was unremarkable no pneumonia, high sensitive troponin 9, EKG with nonspecific ST-T changes. Lower extremity venous Dopplers negative for DVT. Review of Systems:     Constitutional:  negative for chills, fevers, sweats  Respiratory:  +cough, no dyspnea on exertion,+ shortness of breath,+ wheezing  Cardiovascular:  negative for chest pain, chest pressure/discomfort, lower extremity edema, palpitations  Gastrointestinal:  negative for abdominal pain, constipation, diarrhea, nausea, vomiting  Neurological:  negative for dizziness, headache    Medications:      Allergies:  No Known Allergies    Current Meds:   Scheduled Meds:    ipratropium-albuterol  1 ampule Inhalation Q4H WA    amLODIPine  5 mg Oral Daily    busPIRone  15 mg Oral Q6H    escitalopram  30 mg Oral Daily    carBAMazepine  200 mg Oral TID    ferrous sulfate  325 mg Oral BID    folic acid  1 mg Oral Daily    gabapentin  200 mg Oral TID    hydrOXYzine  50 mg Oral TID    magnesium carbonate  54 mg Oral TID    potassium chloride  20 mEq Oral Daily    propranolol  40 mg Oral BID    rOPINIRole  1 mg Oral Nightly    traZODone  100 mg Oral Nightly    vitamin B-1  100 mg Oral Daily    sodium chloride flush  10 mL Intravenous 2 times per day    famotidine  20 mg Oral BID    enoxaparin  40 mg Subcutaneous Daily    multivitamin  1 tablet Oral Daily    predniSONE  40 mg Oral Daily    budesonide-formoterol  2 puff Inhalation BID    cefTRIAXone (ROCEPHIN) IV  1 g Intravenous Q24H     Continuous Infusions:    sodium chloride 75 mL/hr at 10/15/20 1110     PRN Meds: sodium chloride flush, acetaminophen **OR** acetaminophen, polyethylene glycol, promethazine **OR** ondansetron, nicotine, LORazepam **OR** LORazepam **OR** LORazepam **OR** LORazepam **OR** LORazepam **OR** LORazepam **OR** LORazepam **OR** LORazepam, albuterol    Data:     Past Medical History:   has a past medical history of Closed fracture of lateral portion of left tibial plateau, COPD (chronic obstructive pulmonary disease) ([address] Depression, Hypertension, and Pain, joint, ankle and foot. Social History:   reports that she has been smoking cigarettes. She has a 30.00 pack-year smoking history. She has never used smokeless tobacco. She reports current alcohol use. She reports current drug use. Drug: Marijuana. Family History:   Family History   Problem Relation Age of Onset    Other Father     Cancer Maternal Grandmother     Heart Disease Paternal Grandmother        Vitals:  BP (!) 118/91   Pulse 92   Temp 98.1 °F (36.7 °C) (Oral)   Resp 29   Ht 5' 5\" (1.651 m)   Wt 126 lb 14.4 oz (57.6 kg)   SpO2 91%   BMI 21.12 kg/m²   Temp (24hrs), Av.2 °F (36.8 °C), Min:97.3 °F (36.3 °C), Max:98.6 °F (37 °C)    I/O (24Hr): Intake/Output Summary (Last 24 hours) at 10/15/2020 1135  Last data filed at 10/15/2020 0544  Gross per 24 hour   Intake 900 ml   Output 350 ml   Net 550 ml       Labs:  Hematology:  Recent Labs     10/12/20  2033 10/13/20  0626   WBC 9.1 7.2   RBC 2.90* 2.72*   HGB 9.4* 8.7*   HCT 30.2* 27.9*   .1* 102.6   MCH 32.4 32.0   MCHC 31.1 31.2   RDW 17.7* 17.7*    217   MPV 11.1 11.0     Chemistry:  Recent Labs     10/12/20  2033 10/12/20  2217 10/13/20  0626     --  141   K 3.1*  --  3.9     --  104   CO2 22  --  24   GLUCOSE 136*  --  141*   BUN 6  --  6   CREATININE 0.36*  --  0.24*   MG  --   --  1.8   ANIONGAP 18*  --  13   LABGLOM >60  --  >60   GFRAA >60  --  >60   CALCIUM 7.8*  --  7.6*   PROBNP 425*  --   --    TROPHS 9 9  --      Radiology:  Xr Chest Portable    Result Date: 10/13/2020  Clear chest, stable when compared to previous.      Xr Chest Portable    Result Date: 10/12/2020  Negative chest.       Physical Examination:        General appearance:  alert, cooperative and no distress  Mental Status:  oriented to person, place and time and flat affect  Lungs: Improved bilateral air entry compared to yesterday. Coarse b/l breath sounds. + Wheeze diffuse   heart:  regular rate and rhythm, no murmur  Abdomen:  soft, nontender, nondistended, normal bowel sounds, no masses, hepatomegaly, splenomegaly  Extremities:  no edema, redness, tenderness in the calves  Skin:  no gross lesions, rashes, induration    Assessment:        Hospital Problems           Last Modified POA    * (Principal) COPD exacerbation ([facility] [address] 10/13/2020 Yes    Essential hypertension 10/13/2020 Yes    Depression 10/13/2020 Yes    Alcohol withdrawal syndrome without complication ([facility] [facility] 21/78/8432 Yes    Paresthesia of lower extremity 10/13/2020 Yes          Plan:      -Continue empiric antibiotics- switch to Zosyn to cover aspiration, nasal cannula O2 as needed, prednisone IV steroids.  -Continue home dose of antidepressants antihypertensives and Neurontin. -CIWA scale as needed for alcohol withdrawal.   -DVT/GI prophylaxis.       David Zimmer MD  10/15/2020

## 2020-10-15 NOTE — PROGRESS NOTES
Writer called housekeeping again to see about getting bedfellows for the patients seizure precautions-no answer at this time

## 2020-10-15 NOTE — CARE COORDINATION
Received call from patient's mother and sister, who express concern for patient's ability to care for herself, they also say she is depressed, has PTSD and substance abuse issues. They states she was recently given information on BHI from her family physician. I stated I would relay the information to the attending physician, Dr. Sherri Olson and request psychiatric consult.   I have PS Dr. Sherri Olson related to the above

## 2020-10-15 NOTE — PROGRESS NOTES
Physical Therapy  DATE: 10/15/2020    NAME: Elijah Dotson  MRN: [id number]   : 1970    Patient not seen this date for Physical Therapy due to:  [] Blood transfusion in progress  [] Hemodialysis  [x]  Patient Declined  -  Offered to get pt up into her chair to eat her lunch. or go for a walk. Pt declined RN informed. [] Spine Precautions   [] Strict Bedrest  [] Surgery/ Procedure  [] Testing      [] Other        [] PT being discontinued at this time. Patient independent. No further needs. [] PT being discontinued at this time as the patient has been transferred to palliative care. No further needs.     Pardeep Grover, PTA

## 2020-10-16 ENCOUNTER — APPOINTMENT (OUTPATIENT)
Dept: GENERAL RADIOLOGY | Age: 50
DRG: 190 | End: 2020-10-16
Payer: COMMERCIAL

## 2020-10-16 PROBLEM — J96.01 ACUTE RESPIRATORY FAILURE WITH HYPOXIA (HCC): Status: ACTIVE | Noted: 2020-10-16

## 2020-10-16 LAB
ABSOLUTE EOS #: <0.03 K/UL (ref 0–0.44)
ABSOLUTE IMMATURE GRANULOCYTE: 0.07 K/UL (ref 0–0.3)
ABSOLUTE LYMPH #: 1.51 K/UL (ref 1.1–3.7)
ABSOLUTE MONO #: 0.7 K/UL (ref 0.1–1.2)
ALBUMIN SERPL-MCNC: 2 G/DL (ref 3.5–5.2)
ALBUMIN/GLOBULIN RATIO: 0.6 (ref 1–2.5)
ALLEN TEST: ABNORMAL
ALLEN TEST: ABNORMAL
ALP BLD-CCNC: 319 U/L (ref 35–104)
ALT SERPL-CCNC: 17 U/L (ref 5–33)
ANION GAP SERPL CALCULATED.3IONS-SCNC: 8 MMOL/L (ref 9–17)
AST SERPL-CCNC: 76 U/L
BASOPHILS # BLD: 0 % (ref 0–2)
BASOPHILS ABSOLUTE: <0.03 K/UL (ref 0–0.2)
BILIRUB SERPL-MCNC: 0.84 MG/DL (ref 0.3–1.2)
BUN BLDV-MCNC: 7 MG/DL (ref 6–20)
BUN/CREAT BLD: ABNORMAL (ref 9–20)
CALCIUM SERPL-MCNC: 7.9 MG/DL (ref 8.6–10.4)
CHLORIDE BLD-SCNC: 101 MMOL/L (ref 98–107)
CO2: 27 MMOL/L (ref 20–31)
CREAT SERPL-MCNC: 0.24 MG/DL (ref 0.5–0.9)
DIFFERENTIAL TYPE: ABNORMAL
EOSINOPHILS RELATIVE PERCENT: 0 % (ref 1–4)
FIO2: 100
FIO2: 55
GFR AFRICAN AMERICAN: >60 ML/MIN
GFR NON-AFRICAN AMERICAN: >60 ML/MIN
GFR SERPL CREATININE-BSD FRML MDRD: ABNORMAL ML/MIN/{1.73_M2}
GFR SERPL CREATININE-BSD FRML MDRD: ABNORMAL ML/MIN/{1.73_M2}
GLUCOSE BLD-MCNC: 112 MG/DL (ref 74–100)
GLUCOSE BLD-MCNC: 114 MG/DL (ref 65–105)
GLUCOSE BLD-MCNC: 133 MG/DL (ref 74–100)
GLUCOSE BLD-MCNC: 94 MG/DL (ref 70–99)
HCT VFR BLD CALC: 29.9 % (ref 36.3–47.1)
HEMOGLOBIN: 8.9 G/DL (ref 11.9–15.1)
IMMATURE GRANULOCYTES: 1 %
LYMPHOCYTES # BLD: 16 % (ref 24–43)
MCH RBC QN AUTO: 31.6 PG (ref 25.2–33.5)
MCHC RBC AUTO-ENTMCNC: 29.8 G/DL (ref 28.4–34.8)
MCV RBC AUTO: 106 FL (ref 82.6–102.9)
MODE: ABNORMAL
MODE: ABNORMAL
MONOCYTES # BLD: 8 % (ref 3–12)
NEGATIVE BASE EXCESS, ART: ABNORMAL (ref 0–2)
NEGATIVE BASE EXCESS, ART: ABNORMAL (ref 0–2)
NRBC AUTOMATED: 0 PER 100 WBC
O2 DEVICE/FLOW/%: ABNORMAL
O2 DEVICE/FLOW/%: ABNORMAL
PATIENT TEMP: ABNORMAL
PATIENT TEMP: ABNORMAL
PDW BLD-RTO: 17.1 % (ref 11.8–14.4)
PLATELET # BLD: 241 K/UL (ref 138–453)
PLATELET ESTIMATE: ABNORMAL
PMV BLD AUTO: 11.9 FL (ref 8.1–13.5)
POC HCO3: 25.9 MMOL/L (ref 21–28)
POC HCO3: 31.9 MMOL/L (ref 21–28)
POC LACTIC ACID: 0.56 MMOL/L (ref 0.56–1.39)
POC LACTIC ACID: 0.58 MMOL/L (ref 0.56–1.39)
POC O2 SATURATION: 91 % (ref 94–98)
POC O2 SATURATION: 96 % (ref 94–98)
POC PCO2 TEMP: ABNORMAL MM HG
POC PCO2 TEMP: ABNORMAL MM HG
POC PCO2: 37 MM HG (ref 35–48)
POC PCO2: 39.6 MM HG (ref 35–48)
POC PH TEMP: ABNORMAL
POC PH TEMP: ABNORMAL
POC PH: 7.45 (ref 7.35–7.45)
POC PH: 7.51 (ref 7.35–7.45)
POC PO2 TEMP: ABNORMAL MM HG
POC PO2 TEMP: ABNORMAL MM HG
POC PO2: 55.3 MM HG (ref 83–108)
POC PO2: 80.7 MM HG (ref 83–108)
POSITIVE BASE EXCESS, ART: 2 (ref 0–3)
POSITIVE BASE EXCESS, ART: 8 (ref 0–3)
POTASSIUM SERPL-SCNC: 4.5 MMOL/L (ref 3.7–5.3)
RBC # BLD: 2.82 M/UL (ref 3.95–5.11)
RBC # BLD: ABNORMAL 10*6/UL
SAMPLE SITE: ABNORMAL
SAMPLE SITE: ABNORMAL
SEG NEUTROPHILS: 75 % (ref 36–65)
SEGMENTED NEUTROPHILS ABSOLUTE COUNT: 7.06 K/UL (ref 1.5–8.1)
SODIUM BLD-SCNC: 136 MMOL/L (ref 135–144)
TCO2 (CALC), ART: 27 MMOL/L (ref 22–29)
TCO2 (CALC), ART: 33 MMOL/L (ref 22–29)
TOTAL PROTEIN: 5.1 G/DL (ref 6.4–8.3)
WBC # BLD: 9.4 K/UL (ref 3.5–11.3)
WBC # BLD: ABNORMAL 10*3/UL

## 2020-10-16 PROCEDURE — 82947 ASSAY GLUCOSE BLOOD QUANT: CPT

## 2020-10-16 PROCEDURE — 2580000003 HC RX 258: Performed by: INTERNAL MEDICINE

## 2020-10-16 PROCEDURE — 94640 AIRWAY INHALATION TREATMENT: CPT

## 2020-10-16 PROCEDURE — 80053 COMPREHEN METABOLIC PANEL: CPT

## 2020-10-16 PROCEDURE — 36415 COLL VENOUS BLD VENIPUNCTURE: CPT

## 2020-10-16 PROCEDURE — 99233 SBSQ HOSP IP/OBS HIGH 50: CPT | Performed by: INTERNAL MEDICINE

## 2020-10-16 PROCEDURE — 82803 BLOOD GASES ANY COMBINATION: CPT

## 2020-10-16 PROCEDURE — 83605 ASSAY OF LACTIC ACID: CPT

## 2020-10-16 PROCEDURE — 71045 X-RAY EXAM CHEST 1 VIEW: CPT

## 2020-10-16 PROCEDURE — 6360000002 HC RX W HCPCS: Performed by: STUDENT IN AN ORGANIZED HEALTH CARE EDUCATION/TRAINING PROGRAM

## 2020-10-16 PROCEDURE — 6360000002 HC RX W HCPCS: Performed by: NURSE PRACTITIONER

## 2020-10-16 PROCEDURE — 6370000000 HC RX 637 (ALT 250 FOR IP): Performed by: INTERNAL MEDICINE

## 2020-10-16 PROCEDURE — 2700000000 HC OXYGEN THERAPY PER DAY

## 2020-10-16 PROCEDURE — 36600 WITHDRAWAL OF ARTERIAL BLOOD: CPT

## 2020-10-16 PROCEDURE — 94660 CPAP INITIATION&MGMT: CPT

## 2020-10-16 PROCEDURE — 85025 COMPLETE CBC W/AUTO DIFF WBC: CPT

## 2020-10-16 PROCEDURE — 2060000000 HC ICU INTERMEDIATE R&B

## 2020-10-16 PROCEDURE — 99254 IP/OBS CNSLTJ NEW/EST MOD 60: CPT | Performed by: INTERNAL MEDICINE

## 2020-10-16 PROCEDURE — 2580000003 HC RX 258: Performed by: NURSE PRACTITIONER

## 2020-10-16 PROCEDURE — 6360000002 HC RX W HCPCS: Performed by: INTERNAL MEDICINE

## 2020-10-16 PROCEDURE — 94761 N-INVAS EAR/PLS OXIMETRY MLT: CPT

## 2020-10-16 PROCEDURE — 6370000000 HC RX 637 (ALT 250 FOR IP): Performed by: NURSE PRACTITIONER

## 2020-10-16 RX ORDER — FUROSEMIDE 10 MG/ML
20 INJECTION INTRAMUSCULAR; INTRAVENOUS ONCE
Status: COMPLETED | OUTPATIENT
Start: 2020-10-16 | End: 2020-10-16

## 2020-10-16 RX ORDER — CHLORDIAZEPOXIDE HYDROCHLORIDE 5 MG/1
10 CAPSULE, GELATIN COATED ORAL 3 TIMES DAILY
Status: DISCONTINUED | OUTPATIENT
Start: 2020-10-16 | End: 2020-10-26 | Stop reason: HOSPADM

## 2020-10-16 RX ORDER — METHYLPREDNISOLONE SODIUM SUCCINATE 40 MG/ML
40 INJECTION, POWDER, LYOPHILIZED, FOR SOLUTION INTRAMUSCULAR; INTRAVENOUS DAILY
Status: DISCONTINUED | OUTPATIENT
Start: 2020-10-16 | End: 2020-10-18

## 2020-10-16 RX ORDER — LORAZEPAM 2 MG/ML
1 INJECTION INTRAMUSCULAR EVERY 6 HOURS PRN
Status: DISCONTINUED | OUTPATIENT
Start: 2020-10-16 | End: 2020-10-23

## 2020-10-16 RX ADMIN — BUSPIRONE HYDROCHLORIDE 15 MG: 15 TABLET ORAL at 05:25

## 2020-10-16 RX ADMIN — FAMOTIDINE 20 MG: 20 TABLET ORAL at 09:50

## 2020-10-16 RX ADMIN — HYDROXYZINE HYDROCHLORIDE 50 MG: 25 TABLET, FILM COATED ORAL at 09:50

## 2020-10-16 RX ADMIN — BUDESONIDE AND FORMOTEROL FUMARATE DIHYDRATE 2 PUFF: 160; 4.5 AEROSOL RESPIRATORY (INHALATION) at 08:07

## 2020-10-16 RX ADMIN — IPRATROPIUM BROMIDE AND ALBUTEROL SULFATE 1 AMPULE: .5; 3 SOLUTION RESPIRATORY (INHALATION) at 12:30

## 2020-10-16 RX ADMIN — GABAPENTIN 200 MG: 100 CAPSULE ORAL at 19:42

## 2020-10-16 RX ADMIN — TRAZODONE HYDROCHLORIDE 100 MG: 100 TABLET ORAL at 19:42

## 2020-10-16 RX ADMIN — CARBAMAZEPINE 200 MG: 100 TABLET, EXTENDED RELEASE ORAL at 09:50

## 2020-10-16 RX ADMIN — ESCITALOPRAM OXALATE 30 MG: 10 TABLET ORAL at 09:50

## 2020-10-16 RX ADMIN — FOLIC ACID 1 MG: 1 TABLET ORAL at 09:50

## 2020-10-16 RX ADMIN — FERROUS SULFATE TAB EC 325 MG (65 MG FE EQUIVALENT) 325 MG: 325 (65 FE) TABLET DELAYED RESPONSE at 09:51

## 2020-10-16 RX ADMIN — GABAPENTIN 200 MG: 100 CAPSULE ORAL at 09:50

## 2020-10-16 RX ADMIN — METHYLPREDNISOLONE SODIUM SUCCINATE 40 MG: 40 INJECTION, POWDER, FOR SOLUTION INTRAMUSCULAR; INTRAVENOUS at 13:17

## 2020-10-16 RX ADMIN — PIPERACILLIN AND TAZOBACTAM 3.38 G: 3; .375 INJECTION, POWDER, FOR SOLUTION INTRAVENOUS at 19:44

## 2020-10-16 RX ADMIN — PROPRANOLOL HYDROCHLORIDE 40 MG: 40 TABLET ORAL at 19:41

## 2020-10-16 RX ADMIN — ROPINIROLE HYDROCHLORIDE 1 MG: 1 TABLET, FILM COATED ORAL at 19:42

## 2020-10-16 RX ADMIN — FUROSEMIDE 20 MG: 10 INJECTION, SOLUTION INTRAMUSCULAR; INTRAVENOUS at 17:47

## 2020-10-16 RX ADMIN — PIPERACILLIN AND TAZOBACTAM 3.38 G: 3; .375 INJECTION, POWDER, FOR SOLUTION INTRAVENOUS at 05:25

## 2020-10-16 RX ADMIN — IPRATROPIUM BROMIDE AND ALBUTEROL SULFATE 1 AMPULE: .5; 3 SOLUTION RESPIRATORY (INHALATION) at 16:16

## 2020-10-16 RX ADMIN — IPRATROPIUM BROMIDE AND ALBUTEROL SULFATE 1 AMPULE: .5; 3 SOLUTION RESPIRATORY (INHALATION) at 08:06

## 2020-10-16 RX ADMIN — BUSPIRONE HYDROCHLORIDE 15 MG: 15 TABLET ORAL at 19:43

## 2020-10-16 RX ADMIN — HYDROXYZINE HYDROCHLORIDE 50 MG: 25 TABLET, FILM COATED ORAL at 19:42

## 2020-10-16 RX ADMIN — LORAZEPAM 4 MG: 2 INJECTION INTRAMUSCULAR; INTRAVENOUS at 05:25

## 2020-10-16 RX ADMIN — CHLORDIAZEPOXIDE HYDROCHLORIDE 10 MG: 5 CAPSULE ORAL at 19:41

## 2020-10-16 RX ADMIN — BUSPIRONE HYDROCHLORIDE 15 MG: 15 TABLET ORAL at 09:51

## 2020-10-16 RX ADMIN — AMLODIPINE BESYLATE 5 MG: 10 TABLET ORAL at 09:50

## 2020-10-16 RX ADMIN — FAMOTIDINE 20 MG: 20 TABLET ORAL at 19:41

## 2020-10-16 RX ADMIN — SODIUM CHLORIDE, PRESERVATIVE FREE 10 ML: 5 INJECTION INTRAVENOUS at 20:00

## 2020-10-16 RX ADMIN — ENOXAPARIN SODIUM 40 MG: 40 INJECTION SUBCUTANEOUS at 09:50

## 2020-10-16 RX ADMIN — CARBAMAZEPINE 200 MG: 100 TABLET, EXTENDED RELEASE ORAL at 19:43

## 2020-10-16 RX ADMIN — LORAZEPAM 1 MG: 2 INJECTION INTRAMUSCULAR; INTRAVENOUS at 19:59

## 2020-10-16 RX ADMIN — PIPERACILLIN AND TAZOBACTAM 3.38 G: 3; .375 INJECTION, POWDER, FOR SOLUTION INTRAVENOUS at 13:17

## 2020-10-16 RX ADMIN — IPRATROPIUM BROMIDE AND ALBUTEROL SULFATE 1 AMPULE: .5; 3 SOLUTION RESPIRATORY (INHALATION) at 20:09

## 2020-10-16 RX ADMIN — FERROUS SULFATE TAB EC 325 MG (65 MG FE EQUIVALENT) 325 MG: 325 (65 FE) TABLET DELAYED RESPONSE at 19:41

## 2020-10-16 RX ADMIN — THERA TABS 1 TABLET: TAB at 09:51

## 2020-10-16 ASSESSMENT — PAIN SCALES - GENERAL
PAINLEVEL_OUTOF10: 0

## 2020-10-16 NOTE — PROGRESS NOTES
[facility]  Office: [address], DO, Noé Maloney, DO, Dionicio Nicholefranny, DO, Chetna Jerry Blood, DO, Leonor Lindsay MD, Ruy Maldonado MD, Tanner Armijo MD, Zoie Mccartney MD, Marianne Fuller MD, Rupal Jeffery MD, Cele Ling MD, Tai Enrique MD, Mary Metz MD, Adrien Xie, DO, Sumeet Caro MD, Nyoka Hodgkins, MD, Asa Dickerson DO, Jadene Boast, MD,  Lo Liu DO, Saul Serrato MD, Lonnie Agustin MD, Elo Mcbride CNP, [address], CNP, Bear Seal, CNP, Lefty Doshi, CNS, Carlita Rear, CNP, Denita Park, CNP, Raven Foot, CNP, Kamini Gamma, CNP, Niki Loser, CNP, Ayaka Pickard PA-C, Roselia Guillen DNP, Mikel Clifford, CNP, Ara Hollis, CNP, Gerda Vivas, CNP, Yuri Diane, CNP, Carlos Rodriguez, [address] [place] [address]    Progress Note    Name:   Giuseppe Hobbs  MRN:     [id number]     Acct:      [de-identified]   Room:   2003/2003-01  IP Day:  4  Admit Date:  10/12/2020  7:48 PM    PCP:   Praneeth Gramajo MD  Code Status:  Full Code    Subjective:     C/C:   Chief Complaint   Patient presents with    Shortness of Breath     Interval History Status: improved. Patient in bed, on NRB this morning. Per RN was desaturating into 80s, noncompliant with NC and NRB mask. +cough with medications this morning. Drowsy. Afebrile, hemodynamically stable. RR in 35s. Brief History:   25-year-old brought in by family after being noted to be hypoxic at her PCPs office. Patient does complain of productive cough, bilateral lower extremity painx 2-3 months but worse in the last month. Chronic unchanged shortness of breath going on for more than a year. Has been using albuterol inhaler as needed. Continues to smoke about 1 pack/day. Drinks alcohol every day: Rum. Known history of hypertension, depression, COPD, excision of Astrocytoma.   - ED evaluation showed patient was afebrile, hypoxic to 84% on room air, intoxicated with alcohol level 244, anemic hemoglobin 9.4, COVID rapid NAAT:  Negative, chest x-ray was unremarkable no pneumonia, high sensitive troponin 9, EKG with nonspecific ST-T changes. Lower extremity venous Dopplers negative for DVT. Review of Systems:     Constitutional:  negative for chills, fevers, sweats  Respiratory:  +cough, no dyspnea on exertion,+ shortness of breath,+ wheezing  Cardiovascular:  negative for chest pain, chest pressure/discomfort, lower extremity edema, palpitations  Gastrointestinal:  negative for abdominal pain, constipation, diarrhea, nausea, vomiting  Neurological:  negative for dizziness, headache  Medications:      Allergies:  No Known Allergies    Current Meds:   Scheduled Meds:    chlordiazePOXIDE  10 mg Oral TID    methylPREDNISolone  40 mg Intravenous Daily    ipratropium-albuterol  1 ampule Inhalation Q4H WA    piperacillin-tazobactam  3.375 g Intravenous Q8H    amLODIPine  5 mg Oral Daily    busPIRone  15 mg Oral Q6H    escitalopram  30 mg Oral Daily    carBAMazepine  200 mg Oral TID    ferrous sulfate  325 mg Oral BID    folic acid  1 mg Oral Daily    gabapentin  200 mg Oral TID    hydrOXYzine  50 mg Oral TID    magnesium carbonate  54 mg Oral TID    potassium chloride  20 mEq Oral Daily    propranolol  40 mg Oral BID    rOPINIRole  1 mg Oral Nightly    traZODone  100 mg Oral Nightly    vitamin B-1  100 mg Oral Daily    sodium chloride flush  10 mL Intravenous 2 times per day    famotidine  20 mg Oral BID    enoxaparin  40 mg Subcutaneous Daily    multivitamin  1 tablet Oral Daily    budesonide-formoterol  2 puff Inhalation BID     Continuous Infusions:    sodium chloride 75 mL/hr at 10/15/20 2341     PRN Meds: sodium chloride flush, acetaminophen **OR** acetaminophen, polyethylene glycol, promethazine **OR** ondansetron, nicotine, LORazepam **OR** LORazepam **OR** LORazepam **OR** LORazepam **OR** LORazepam **OR** LORazepam **OR** LORazepam **OR** LORazepam, albuterol    Data:     Past Medical History:   has a past medical history of Closed fracture of lateral portion of left tibial plateau, COPD (chronic obstructive pulmonary disease) ([address] Depression, Hypertension, and Pain, joint, ankle and foot. Social History:   reports that she has been smoking cigarettes. She has a 30.00 pack-year smoking history. She has never used smokeless tobacco. She reports current alcohol use. She reports current drug use. Drug: Marijuana. Family History:   Family History   Problem Relation Age of Onset    Other Father     Cancer Maternal Grandmother     Heart Disease Paternal Grandmother        Vitals:  /83   Pulse 79   Temp 98.4 °F (36.9 °C) (Oral)   Resp 28   Ht 5' 5\" (1.651 m)   Wt 126 lb 3.2 oz (57.2 kg)   SpO2 91%   BMI 21.00 kg/m²   Temp (24hrs), Av.8 °F (37.1 °C), Min:98.4 °F (36.9 °C), Max:99 °F (37.2 °C)    I/O (24Hr): No intake or output data in the 24 hours ending 10/16/20 1039    Labs:  Hematology:  Recent Labs     10/15/20  1157   WBC 9.1   RBC 2.76*   HGB 9.0*   HCT 30.4*   .1*   MCH 32.6   MCHC 29.6   RDW 17.1*      MPV 11.8     Chemistry:  Recent Labs     10/15/20  1157      K 5.1      CO2 25   GLUCOSE 106*   BUN 13   CREATININE 0.28*   ANIONGAP 8*   LABGLOM >60   GFRAA >60   CALCIUM 8.3*     Radiology:  Xr Chest Portable    Result Date: 10/13/2020  Clear chest, stable when compared to previous. Xr Chest Portable    Result Date: 10/12/2020  Negative chest.       Physical Examination:        General appearance:  alert, cooperative and in resp distress, maintaining O2 sats on NRB   Mental Status:  oriented to person, place and time , slow to respond to questions. Lungs: good b/l air entry +diffuse wheeze.    heart:  regular rate and rhythm, no murmur  Abdomen:  soft, nontender, nondistended, normal bowel sounds, no masses, hepatomegaly, splenomegaly  Extremities:  no edema, redness, tenderness in the calves  Skin:  no gross lesions, rashes, induration    Assessment:        Hospital Problems           Last Modified POA    * (Principal) COPD exacerbation ([facility] [address] 10/13/2020 Yes    Essential hypertension 10/13/2020 Yes    Depression 10/13/2020 Yes    Alcohol withdrawal syndrome without complication ([facility] [facility] 77/71/1461 Yes    Paresthesia of lower extremity 10/13/2020 Yes    Acute respiratory failure with hypoxia ([facility] [facility] 10/16/2020 Yes        Plan:      -Continue empiric antibiotics- continue Zosyn to cover aspiration, switch to IV steroids. Keep NPO sec to aspiration concern. Place on BiPAP. Check CBC, BMP, ABG, CXR. Will consult Pulmonology to help with resp failure.   -Continue home dose of antidepressants antihypertensives and Neurontin. -CIWA scale as needed for alcohol withdrawal. Will add [id number] TID. -DVT/GI prophylaxis. Discussed code status with Barbara: She is agreeable with CPR/resustitation meds and intubation. Called patients mother Josette Daniels , she placed me on speaker so that Barbara's sister who is an out of  talk to me. I gave clinical update, discussed code status, Barbara's wishes and prognosis. They both expressed concern that patient has not been takign care of herself , not taking her medications at home. They wanted to complete POA paperwork. Will consult Pastoral Care to complete POA paperwork.     Stacey Cassidy MD  10/16/2020

## 2020-10-16 NOTE — FLOWSHEET NOTE
This note also relates to the following rows which could not be included:  Pulse - Cannot attach notes to unvalidated device data    PATIENT PLACED ON WAFFLE MATTRESS TO AID IN REDUCTION OF SKIN BREAKDOWN, PATIENT REPOSITIONED WITH MOUTH CARE GIVEN, PT INCONTINENT OF STOOL AND URINE, MIREYA CARE PROVIDED, PUREWICK PLACED AND CONNECTED TO SUCTION, PATIENT DENIES FURTHER NEEDS AT THIS TIME

## 2020-10-16 NOTE — FLOWSHEET NOTE
Patient remains on bipap, resting more comfortably and is following commands but is still drowsy, repositioned in bed for comfort heels elevated bilateral, oral care provided.

## 2020-10-16 NOTE — FLOWSHEET NOTE
Patient pulling at bipap mask, educated on importance of wearing bipap mask as ordered and encouraged patient to not pull on mask.

## 2020-10-16 NOTE — FLOWSHEET NOTE
Patient repositioned 2 assist for comfort and to aid in prevention of skin breakdown, oral care provided, patient remains on bipap per order at this time.

## 2020-10-16 NOTE — FLOWSHEET NOTE
Dr Mercy Castillo at bedside notified that attempted to give patient medication pt took medication then started coughing patient mouth suctioned, declines meal, taking off mask and then desats into the 80s.  Patient not tolerating non rebreather and nasal cannula

## 2020-10-16 NOTE — PROGRESS NOTES
Occupational Therapy    Occupational Therapy Not Seen Note    DATE: 10/16/2020  Name: Vickey Vann  : 1970  MRN: [id number]    Patient not available for Occupational Therapy due to: Other: Pt not appropriate this day. Per RN, pt experiencing respiratory distress requiring bi-pap.       Electronically signed by NATALIE Aguayo on 10/16/2020 at 3:40 PM

## 2020-10-16 NOTE — PROGRESS NOTES
Physical Therapy  DATE: 10/16/2020    NAME: Tomasz Mixon  MRN: [id number]   : 1970    Patient not seen this date for Physical Therapy due to:  [] Blood transfusion in progress  [] Hemodialysis  []  Patient Declined  [] Spine Precautions   [] Strict Bedrest  [] Surgery/ Procedure  [] Testing      [x] Other  -  Per RN pt had to be medicated to tolerate being on  BI-PAP. Will check on pt tomorrow. [] PT being discontinued at this time. Patient independent. No further needs. [] PT being discontinued at this time as the patient has been transferred to palliative care. No further needs.     Jae Roca, PTA

## 2020-10-16 NOTE — FLOWSHEET NOTE
SPIRITUAL CARE DEPARTMENT - [facility] [address]  PROGRESS NOTE    Shift date: 10/16/20  Shift day: Friday   Shift # 1    Room # 2003/2003-01   Name: Toni Cole            Age: 45 y.o. Gender: female              Referral: [address] [place] [address] Date & Time: 10/12/2020  7:48 PM    PATIENT/EVENT DESCRIPTION:  Toni Cole is a 45 y.o. female the patient is unable to respond at this time. The nurse says the patient has been receiving medication to help calm her, because she is irritated with being on a BiPAP. The patient is not fully aware enough to fill out POA paperwork at this time. SPIRITUAL ASSESSMENT/INTERVENTION:  The  talked to the nurse and assessed the patient. The  was unable to go over POA paperwork with the patient at this time. SPIRITUAL CARE FOLLOW-UP PLAN:  Chaplains will remain available to offer spiritual and emotional support as needed.       Electronically signed by Nalini Kerr, on 10/16/2020 at 12:52 PM.  [facility]  [phone]       10/16/20 1210   Encounter Summary   Services provided to: Patient not available   Referral/Consult From: Multi-disciplinary team   Continue Visiting   (10/16/20)   Complexity of Encounter Moderate   Length of Encounter 15 minutes   Spiritual Assessment Completed Yes   Routine   Type Initial   Assessment Unable to respond   Intervention Sustaining presence/ Ministry of presence   Outcome Did not respond
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
## 2020-10-16 NOTE — FLOWSHEET NOTE
Patient repositioned for comfort and aid in reduction of skin breakdown, pt incontinent of urine and stool bella care provided new purewick placed, oral care provided.

## 2020-10-16 NOTE — PLAN OF CARE
Problem: Breathing Pattern - Ineffective:  Goal: Ability to achieve and maintain a regular respiratory rate will improve  Description: Ability to achieve and maintain a regular respiratory rate will improve  10/16/2020 0812 by Aylin Rios RCP  Outcome: Ongoing  10/15/2020 2221 by Nat Huerta RN  Outcome: Ongoing     Problem: Gas Exchange - Impaired:  Goal: Levels of oxygenation will improve  Description: Levels of oxygenation will improve  10/16/2020 0812 by Aylin Rios RCP  Outcome: Ongoing  10/15/2020 2221 by Nat Huerta RN  Outcome: Ongoing

## 2020-10-16 NOTE — PLAN OF CARE
Problem: Falls - Risk of:  Goal: Will remain free from falls  Description: Will remain free from falls  10/15/2020 2221 by Ismael Kumar RN  Outcome: Ongoing  10/15/2020 1309 by Dunia Corral RN  Outcome: Ongoing  Goal: Absence of physical injury  Description: Absence of physical injury  10/15/2020 2221 by Ismael Kumar RN  Outcome: Ongoing  10/15/2020 1309 by Dunia Corral RN  Outcome: Ongoing     Problem: Pain:  Goal: Pain level will decrease  Description: Pain level will decrease  10/15/2020 2221 by Ismael Kumar RN  Outcome: Ongoing  10/15/2020 1309 by Dunia Corral RN  Outcome: Ongoing  Goal: Control of acute pain  Description: Control of acute pain  10/15/2020 2221 by Ismael Kumar RN  Outcome: Ongoing  10/15/2020 1309 by Dunia Corral RN  Outcome: Ongoing  Goal: Control of chronic pain  Description: Control of chronic pain  10/15/2020 2221 by Ismael Kumar RN  Outcome: Ongoing  10/15/2020 1309 by Dunia Corral RN  Outcome: Ongoing     Problem: Breathing Pattern - Ineffective:  Goal: Ability to achieve and maintain a regular respiratory rate will improve  Description: Ability to achieve and maintain a regular respiratory rate will improve  10/15/2020 2221 by Ismael Kumar RN  Outcome: Ongoing  10/15/2020 1309 by Dunia Corral RN  Outcome: Ongoing  10/15/2020 0914 by Trey Rodriguez RCP  Outcome: Ongoing     Problem: Gas Exchange - Impaired:  Goal: Levels of oxygenation will improve  Description: Levels of oxygenation will improve  10/15/2020 2221 by Ismael Kumar RN  Outcome: Ongoing  10/15/2020 1309 by Dunia Corral RN  Outcome: Ongoing  10/15/2020 0914 by Trey Rodriguez RCP  Outcome: Ongoing     Problem: Skin Integrity:  Goal: Will show no infection signs and symptoms  Description: Will show no infection signs and symptoms  Outcome: Ongoing  Goal: Absence of new skin breakdown  Description: Absence of new skin breakdown  Outcome: Ongoing

## 2020-10-17 ENCOUNTER — APPOINTMENT (OUTPATIENT)
Dept: GENERAL RADIOLOGY | Age: 50
DRG: 190 | End: 2020-10-17
Payer: COMMERCIAL

## 2020-10-17 LAB
ABSOLUTE EOS #: <0.03 K/UL (ref 0–0.44)
ABSOLUTE IMMATURE GRANULOCYTE: 0.1 K/UL (ref 0–0.3)
ABSOLUTE LYMPH #: 1.25 K/UL (ref 1.1–3.7)
ABSOLUTE MONO #: 0.82 K/UL (ref 0.1–1.2)
ALBUMIN SERPL-MCNC: 1.9 G/DL (ref 3.5–5.2)
ALBUMIN/GLOBULIN RATIO: 0.6 (ref 1–2.5)
ALP BLD-CCNC: 258 U/L (ref 35–104)
ALT SERPL-CCNC: 17 U/L (ref 5–33)
ANION GAP SERPL CALCULATED.3IONS-SCNC: 13 MMOL/L (ref 9–17)
AST SERPL-CCNC: 60 U/L
BASOPHILS # BLD: 0 % (ref 0–2)
BASOPHILS ABSOLUTE: <0.03 K/UL (ref 0–0.2)
BILIRUB SERPL-MCNC: 0.8 MG/DL (ref 0.3–1.2)
BUN BLDV-MCNC: 7 MG/DL (ref 6–20)
BUN/CREAT BLD: ABNORMAL (ref 9–20)
CALCIUM SERPL-MCNC: 7.9 MG/DL (ref 8.6–10.4)
CHLORIDE BLD-SCNC: 99 MMOL/L (ref 98–107)
CO2: 24 MMOL/L (ref 20–31)
CREAT SERPL-MCNC: 0.26 MG/DL (ref 0.5–0.9)
DIFFERENTIAL TYPE: ABNORMAL
EOSINOPHILS RELATIVE PERCENT: 0 % (ref 1–4)
GFR AFRICAN AMERICAN: >60 ML/MIN
GFR NON-AFRICAN AMERICAN: >60 ML/MIN
GFR SERPL CREATININE-BSD FRML MDRD: ABNORMAL ML/MIN/{1.73_M2}
GFR SERPL CREATININE-BSD FRML MDRD: ABNORMAL ML/MIN/{1.73_M2}
GLUCOSE BLD-MCNC: 101 MG/DL (ref 70–99)
HCT VFR BLD CALC: 29.3 % (ref 36.3–47.1)
HEMOGLOBIN: 8.7 G/DL (ref 11.9–15.1)
IMMATURE GRANULOCYTES: 1 %
LV EF: 55 %
LVEF MODALITY: NORMAL
LYMPHOCYTES # BLD: 11 % (ref 24–43)
MCH RBC QN AUTO: 32.2 PG (ref 25.2–33.5)
MCHC RBC AUTO-ENTMCNC: 29.7 G/DL (ref 28.4–34.8)
MCV RBC AUTO: 108.5 FL (ref 82.6–102.9)
MONOCYTES # BLD: 8 % (ref 3–12)
NRBC AUTOMATED: 0 PER 100 WBC
PDW BLD-RTO: 16.5 % (ref 11.8–14.4)
PLATELET # BLD: 265 K/UL (ref 138–453)
PLATELET ESTIMATE: ABNORMAL
PMV BLD AUTO: 11.9 FL (ref 8.1–13.5)
POTASSIUM SERPL-SCNC: 3.9 MMOL/L (ref 3.7–5.3)
RBC # BLD: 2.7 M/UL (ref 3.95–5.11)
RBC # BLD: ABNORMAL 10*6/UL
SEG NEUTROPHILS: 80 % (ref 36–65)
SEGMENTED NEUTROPHILS ABSOLUTE COUNT: 8.81 K/UL (ref 1.5–8.1)
SODIUM BLD-SCNC: 136 MMOL/L (ref 135–144)
TOTAL PROTEIN: 5.3 G/DL (ref 6.4–8.3)
WBC # BLD: 11 K/UL (ref 3.5–11.3)
WBC # BLD: ABNORMAL 10*3/UL

## 2020-10-17 PROCEDURE — 6360000002 HC RX W HCPCS: Performed by: STUDENT IN AN ORGANIZED HEALTH CARE EDUCATION/TRAINING PROGRAM

## 2020-10-17 PROCEDURE — 94761 N-INVAS EAR/PLS OXIMETRY MLT: CPT

## 2020-10-17 PROCEDURE — 2060000000 HC ICU INTERMEDIATE R&B

## 2020-10-17 PROCEDURE — 71045 X-RAY EXAM CHEST 1 VIEW: CPT

## 2020-10-17 PROCEDURE — 6360000002 HC RX W HCPCS: Performed by: NURSE PRACTITIONER

## 2020-10-17 PROCEDURE — 6370000000 HC RX 637 (ALT 250 FOR IP): Performed by: INTERNAL MEDICINE

## 2020-10-17 PROCEDURE — 2700000000 HC OXYGEN THERAPY PER DAY

## 2020-10-17 PROCEDURE — 99233 SBSQ HOSP IP/OBS HIGH 50: CPT | Performed by: INTERNAL MEDICINE

## 2020-10-17 PROCEDURE — 80053 COMPREHEN METABOLIC PANEL: CPT

## 2020-10-17 PROCEDURE — 94660 CPAP INITIATION&MGMT: CPT

## 2020-10-17 PROCEDURE — 2580000003 HC RX 258: Performed by: NURSE PRACTITIONER

## 2020-10-17 PROCEDURE — 6370000000 HC RX 637 (ALT 250 FOR IP): Performed by: NURSE PRACTITIONER

## 2020-10-17 PROCEDURE — 99232 SBSQ HOSP IP/OBS MODERATE 35: CPT | Performed by: INTERNAL MEDICINE

## 2020-10-17 PROCEDURE — 2580000003 HC RX 258: Performed by: INTERNAL MEDICINE

## 2020-10-17 PROCEDURE — 93306 TTE W/DOPPLER COMPLETE: CPT

## 2020-10-17 PROCEDURE — 85025 COMPLETE CBC W/AUTO DIFF WBC: CPT

## 2020-10-17 PROCEDURE — 6360000002 HC RX W HCPCS: Performed by: INTERNAL MEDICINE

## 2020-10-17 PROCEDURE — 36415 COLL VENOUS BLD VENIPUNCTURE: CPT

## 2020-10-17 PROCEDURE — 94640 AIRWAY INHALATION TREATMENT: CPT

## 2020-10-17 RX ORDER — FUROSEMIDE 10 MG/ML
20 INJECTION INTRAMUSCULAR; INTRAVENOUS ONCE
Status: COMPLETED | OUTPATIENT
Start: 2020-10-17 | End: 2020-10-17

## 2020-10-17 RX ADMIN — METHYLPREDNISOLONE SODIUM SUCCINATE 40 MG: 40 INJECTION, POWDER, FOR SOLUTION INTRAMUSCULAR; INTRAVENOUS at 08:50

## 2020-10-17 RX ADMIN — AMLODIPINE BESYLATE 5 MG: 10 TABLET ORAL at 08:49

## 2020-10-17 RX ADMIN — ESCITALOPRAM OXALATE 30 MG: 10 TABLET ORAL at 08:49

## 2020-10-17 RX ADMIN — THERA TABS 1 TABLET: TAB at 11:55

## 2020-10-17 RX ADMIN — HYDROXYZINE HYDROCHLORIDE 50 MG: 25 TABLET, FILM COATED ORAL at 15:08

## 2020-10-17 RX ADMIN — ROPINIROLE HYDROCHLORIDE 1 MG: 1 TABLET, FILM COATED ORAL at 20:38

## 2020-10-17 RX ADMIN — FAMOTIDINE 20 MG: 20 TABLET ORAL at 08:49

## 2020-10-17 RX ADMIN — SODIUM CHLORIDE, PRESERVATIVE FREE 10 ML: 5 INJECTION INTRAVENOUS at 20:38

## 2020-10-17 RX ADMIN — PIPERACILLIN AND TAZOBACTAM 3.38 G: 3; .375 INJECTION, POWDER, FOR SOLUTION INTRAVENOUS at 20:38

## 2020-10-17 RX ADMIN — IPRATROPIUM BROMIDE AND ALBUTEROL SULFATE 1 AMPULE: .5; 3 SOLUTION RESPIRATORY (INHALATION) at 11:13

## 2020-10-17 RX ADMIN — PIPERACILLIN AND TAZOBACTAM 3.38 G: 3; .375 INJECTION, POWDER, FOR SOLUTION INTRAVENOUS at 12:07

## 2020-10-17 RX ADMIN — FOLIC ACID 1 MG: 1 TABLET ORAL at 08:49

## 2020-10-17 RX ADMIN — POTASSIUM CHLORIDE 20 MEQ: 1500 TABLET, EXTENDED RELEASE ORAL at 08:50

## 2020-10-17 RX ADMIN — CARBAMAZEPINE 200 MG: 100 TABLET, EXTENDED RELEASE ORAL at 20:38

## 2020-10-17 RX ADMIN — CARBAMAZEPINE 200 MG: 100 TABLET, EXTENDED RELEASE ORAL at 15:09

## 2020-10-17 RX ADMIN — BUSPIRONE HYDROCHLORIDE 15 MG: 15 TABLET ORAL at 20:38

## 2020-10-17 RX ADMIN — FAMOTIDINE 20 MG: 20 TABLET ORAL at 20:37

## 2020-10-17 RX ADMIN — BUSPIRONE HYDROCHLORIDE 15 MG: 15 TABLET ORAL at 08:49

## 2020-10-17 RX ADMIN — CARBAMAZEPINE 200 MG: 100 TABLET, EXTENDED RELEASE ORAL at 08:49

## 2020-10-17 RX ADMIN — GABAPENTIN 200 MG: 100 CAPSULE ORAL at 20:38

## 2020-10-17 RX ADMIN — GABAPENTIN 200 MG: 100 CAPSULE ORAL at 08:49

## 2020-10-17 RX ADMIN — FERROUS SULFATE TAB EC 325 MG (65 MG FE EQUIVALENT) 325 MG: 325 (65 FE) TABLET DELAYED RESPONSE at 08:49

## 2020-10-17 RX ADMIN — CHLORDIAZEPOXIDE HYDROCHLORIDE 10 MG: 5 CAPSULE ORAL at 20:37

## 2020-10-17 RX ADMIN — BUDESONIDE AND FORMOTEROL FUMARATE DIHYDRATE 2 PUFF: 160; 4.5 AEROSOL RESPIRATORY (INHALATION) at 07:45

## 2020-10-17 RX ADMIN — PIPERACILLIN AND TAZOBACTAM 3.38 G: 3; .375 INJECTION, POWDER, FOR SOLUTION INTRAVENOUS at 04:47

## 2020-10-17 RX ADMIN — HYDROXYZINE HYDROCHLORIDE 50 MG: 25 TABLET, FILM COATED ORAL at 08:49

## 2020-10-17 RX ADMIN — PROPRANOLOL HYDROCHLORIDE 40 MG: 40 TABLET ORAL at 20:38

## 2020-10-17 RX ADMIN — HYDROXYZINE HYDROCHLORIDE 50 MG: 25 TABLET, FILM COATED ORAL at 20:38

## 2020-10-17 RX ADMIN — ONDANSETRON 4 MG: 2 INJECTION INTRAMUSCULAR; INTRAVENOUS at 16:24

## 2020-10-17 RX ADMIN — GABAPENTIN 200 MG: 100 CAPSULE ORAL at 15:09

## 2020-10-17 RX ADMIN — Medication 54 MG: at 22:00

## 2020-10-17 RX ADMIN — ENOXAPARIN SODIUM 40 MG: 40 INJECTION SUBCUTANEOUS at 08:50

## 2020-10-17 RX ADMIN — TRAZODONE HYDROCHLORIDE 100 MG: 100 TABLET ORAL at 20:37

## 2020-10-17 RX ADMIN — Medication 54 MG: at 16:46

## 2020-10-17 RX ADMIN — CHLORDIAZEPOXIDE HYDROCHLORIDE 10 MG: 5 CAPSULE ORAL at 11:59

## 2020-10-17 RX ADMIN — SODIUM CHLORIDE, PRESERVATIVE FREE 10 ML: 5 INJECTION INTRAVENOUS at 08:57

## 2020-10-17 RX ADMIN — Medication 54 MG: at 08:53

## 2020-10-17 RX ADMIN — Medication 100 MG: at 08:49

## 2020-10-17 RX ADMIN — PROPRANOLOL HYDROCHLORIDE 40 MG: 40 TABLET ORAL at 08:50

## 2020-10-17 RX ADMIN — IPRATROPIUM BROMIDE AND ALBUTEROL SULFATE 1 AMPULE: .5; 3 SOLUTION RESPIRATORY (INHALATION) at 19:06

## 2020-10-17 RX ADMIN — FERROUS SULFATE TAB EC 325 MG (65 MG FE EQUIVALENT) 325 MG: 325 (65 FE) TABLET DELAYED RESPONSE at 20:38

## 2020-10-17 RX ADMIN — IPRATROPIUM BROMIDE AND ALBUTEROL SULFATE 1 AMPULE: .5; 3 SOLUTION RESPIRATORY (INHALATION) at 15:21

## 2020-10-17 RX ADMIN — BUSPIRONE HYDROCHLORIDE 15 MG: 15 TABLET ORAL at 15:09

## 2020-10-17 RX ADMIN — BUDESONIDE AND FORMOTEROL FUMARATE DIHYDRATE 2 PUFF: 160; 4.5 AEROSOL RESPIRATORY (INHALATION) at 19:06

## 2020-10-17 RX ADMIN — IPRATROPIUM BROMIDE AND ALBUTEROL SULFATE 1 AMPULE: .5; 3 SOLUTION RESPIRATORY (INHALATION) at 07:45

## 2020-10-17 RX ADMIN — FUROSEMIDE 20 MG: 10 INJECTION, SOLUTION INTRAMUSCULAR; INTRAVENOUS at 11:56

## 2020-10-17 ASSESSMENT — PAIN SCALES - GENERAL
PAINLEVEL_OUTOF10: 4
PAINLEVEL_OUTOF10: 0

## 2020-10-17 NOTE — FLOWSHEET NOTE
Assessment: Pt is a 45 yr old female who was visited for a [facility] consult for Adv. Dir. Also  was paged when family was present.  met with nurse prior to visiting pt; she stated pt is oriented x3 and doesn't feel pt is capable of completing Adv. Dir. Upon entering the room pt's mother was present and pt's sisters were video conference.  explained we are unable to complete Adv. Dir. At this time. Family is insisting that pt be declared unable to make decisions as her bad decision have led to her hospitalization.  explained Adv. Dir. Does not sign over rights of patient. Intervention:  responded to consult and page from nurse.  provided a space to express their thoughts and feelings about this situation. Outcome: Family was thankful for visit but still wants to seek ways of taking control of pt's health decisions. This not was written by chaplain Randy.
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
## 2020-10-17 NOTE — PLAN OF CARE
Problem: Falls - Risk of:  Goal: Will remain free from falls  Description: Will remain free from falls  Outcome: Ongoing  Goal: Absence of physical injury  Description: Absence of physical injury  Outcome: Ongoing     Problem: Pain:  Goal: Pain level will decrease  Description: Pain level will decrease  Outcome: Ongoing  Goal: Control of acute pain  Description: Control of acute pain  Outcome: Ongoing  Goal: Control of chronic pain  Description: Control of chronic pain  Outcome: Ongoing     Problem: Breathing Pattern - Ineffective:  Goal: Ability to achieve and maintain a regular respiratory rate will improve  Description: Ability to achieve and maintain a regular respiratory rate will improve  Outcome: Ongoing     Problem: Gas Exchange - Impaired:  Goal: Levels of oxygenation will improve  Description: Levels of oxygenation will improve  Outcome: Ongoing     Problem: Skin Integrity:  Goal: Will show no infection signs and symptoms  Description: Will show no infection signs and symptoms  Outcome: Ongoing  Goal: Absence of new skin breakdown  Description: Absence of new skin breakdown  Outcome: Ongoing

## 2020-10-17 NOTE — PROGRESS NOTES
[facility]  Office: [address], DO, Santos Kras, DO, Melissa Ng, DO, Manpreet Norma Blood, DO, Vicente Mccann MD, Samuel Chaudhry MD, Letty Banuelos MD, Emre Ellsworth MD, Rudy Deleon MD, Michael Montes MD, Yohan Jordan MD, Zaira Guido MD, Mary Guerra MD, Beba Fuentes, DO, Theresa Weller MD, Alf Hicks MD, Florence Prather, DO, Rubio Rivero MD,  Emir Campoverde, DO, Jozef Neal MD, Louis Marie MD, Karla Fitzgerald [facility], [address], [facility], Ortiz Garcia, CNP, Rosio Kc, CNS, Avtar Jane, CNP, Glen Section, CNP, Helen Chavis, CNP, Ruth Alberts, CNP, Shilpa Soldmirna, CNP, Delvin Hurtado PA-C, Christie Daniels [facility], Sita Kay, CNP, Orysia Soda, CNP, Wyvamshi Husbands, CNP, Colletta Moros, CNP, Ricky Leahy, [address] [place] [address]    Progress Note    Name:   Sussy Dickinson  MRN:     [id number]     Acct:      [de-identified]   Room:   2003/2003-01  IP Day:  5  Admit Date:  10/12/2020  7:48 PM    PCP:   Ben Israel MD  Code Status:  Full Code    Subjective:     C/C:   Chief Complaint   Patient presents with    Shortness of Breath     Interval History Status: improved. Patient in bed, on BiPAP. Received 1 dose of Ativan overnight. afebrile, hemodynamically stable. Suboptimal urine output. Received a dose of Lasix. Slight drop in blood pressure after. Repeat chest x-ray this morning showed improved edema. Echo pending  Brief History:   69-year-old brought in by family after being noted to be hypoxic at her PCPs office. Patient does complain of productive cough, bilateral lower extremity painx 2-3 months but worse in the last month. Chronic unchanged shortness of breath going on for more than a year. Has been using albuterol inhaler as needed. Continues to smoke about 1 pack/day. Drinks alcohol every day: Rum.   Known history of hypertension, depression, COPD, excision of Astrocytoma. - ED evaluation showed patient was afebrile, hypoxic to 84% on room air, intoxicated with alcohol level 244, anemic hemoglobin 9.4, COVID rapid NAAT:  Negative, chest x-ray was unremarkable no pneumonia, high sensitive troponin 9, EKG with nonspecific ST-T changes. Lower extremity venous Dopplers negative for DVT. Review of Systems:     Constitutional:  negative for chills, fevers, sweats  Respiratory:  +cough, no dyspnea on exertion,+ shortness of breath,+ wheezing  Cardiovascular:  negative for chest pain, chest pressure/discomfort, lower extremity edema, palpitations  Gastrointestinal:  negative for abdominal pain, constipation, diarrhea, nausea, vomiting  Neurological:  negative for dizziness, headache  Medications:      Allergies:  No Known Allergies    Current Meds:   Scheduled Meds:    chlordiazePOXIDE  10 mg Oral TID    methylPREDNISolone  40 mg Intravenous Daily    ipratropium-albuterol  1 ampule Inhalation Q4H WA    piperacillin-tazobactam  3.375 g Intravenous Q8H    amLODIPine  5 mg Oral Daily    busPIRone  15 mg Oral Q6H    escitalopram  30 mg Oral Daily    carBAMazepine  200 mg Oral TID    ferrous sulfate  325 mg Oral BID    folic acid  1 mg Oral Daily    gabapentin  200 mg Oral TID    hydrOXYzine  50 mg Oral TID    magnesium carbonate  54 mg Oral TID    potassium chloride  20 mEq Oral Daily    propranolol  40 mg Oral BID    rOPINIRole  1 mg Oral Nightly    traZODone  100 mg Oral Nightly    vitamin B-1  100 mg Oral Daily    sodium chloride flush  10 mL Intravenous 2 times per day    famotidine  20 mg Oral BID    enoxaparin  40 mg Subcutaneous Daily    multivitamin  1 tablet Oral Daily    budesonide-formoterol  2 puff Inhalation BID     Continuous Infusions:     PRN Meds: LORazepam, sodium chloride flush, acetaminophen **OR** acetaminophen, polyethylene glycol, promethazine **OR** ondansetron, nicotine, albuterol    Data:     Past Medical History:   has a past medical history of Closed fracture of lateral portion of left tibial plateau, COPD (chronic obstructive pulmonary disease) ([address] Depression, Hypertension, and Pain, joint, ankle and foot. Social History:   reports that she has been smoking cigarettes. She has a 30.00 pack-year smoking history. She has never used smokeless tobacco. She reports current alcohol use. She reports current drug use. Drug: Marijuana. Family History:   Family History   Problem Relation Age of Onset    Other Father     Cancer Maternal Grandmother     Heart Disease Paternal Grandmother        Vitals:  /72   Pulse 83   Temp 99 °F (37.2 °C) (Oral)   Resp 21   Ht 5' 5\" (1.651 m)   Wt 125 lb 9.6 oz (57 kg)   SpO2 95%   BMI 20.90 kg/m²   Temp (24hrs), Av.5 °F (36.9 °C), Min:98.1 °F (36.7 °C), Max:99 °F (37.2 °C)    I/O (24Hr): Intake/Output Summary (Last 24 hours) at 10/17/2020 1736  Last data filed at 10/17/2020 1600  Gross per 24 hour   Intake 1680 ml   Output 750 ml   Net 930 ml       Labs:  Hematology:  Recent Labs     10/15/20  1157 10/16/20  1045 10/17/20  0546   WBC 9.1 9.4 11.0   RBC 2.76* 2.82* 2.70*   HGB 9.0* 8.9* 8.7*   HCT 30.4* 29.9* 29.3*   .1* 106.0* 108.5*   MCH 32.6 31.6 32.2   MCHC 29.6 29.8 29.7   RDW 17.1* 17.1* 16.5*    241 265   MPV 11.8 11.9 11.9     Chemistry:  Recent Labs     10/15/20  1157 10/16/20  1045 10/17/20  0546    136 136   K 5.1 4.5 3.9    101 99   CO2 25 27 24   GLUCOSE 106* 94 101*   BUN 13 7 7   CREATININE 0.28* 0.24* 0.26*   ANIONGAP 8* 8* 13   LABGLOM >60 >60 >60   GFRAA >60 >60 >60   CALCIUM 8.3* 7.9* 7.9*     Radiology:  Xr Chest Portable    Result Date: 10/13/2020  Clear chest, stable when compared to previous. Xr Chest Portable    Result Date: 10/12/2020  Negative chest.       Physical Examination:        General appearance: Awakens to name call. Weak, mumbling, appears to be oriented x3.   Mental Status:  oriented to person, place and time ,

## 2020-10-17 NOTE — PLAN OF CARE
Problem: Falls - Risk of:  Goal: Will remain free from falls  Description: Will remain free from falls  10/17/2020 0743 by Kamari Smith RN  Outcome: Ongoing  10/16/2020 2301 by Barb Cope RN  Outcome: Ongoing  Goal: Absence of physical injury  Description: Absence of physical injury  10/17/2020 0743 by Kamari Smith RN  Outcome: Ongoing  10/16/2020 2301 by Barb Cope RN  Outcome: Ongoing     Problem: Pain:  Goal: Pain level will decrease  Description: Pain level will decrease  10/17/2020 0743 by Kamari Smith RN  Outcome: Ongoing  10/16/2020 2301 by Barb Cope RN  Outcome: Ongoing  Goal: Control of acute pain  Description: Control of acute pain  10/17/2020 0743 by Kamari Smith RN  Outcome: Ongoing  10/16/2020 2301 by Barb Cope RN  Outcome: Ongoing  Goal: Control of chronic pain  Description: Control of chronic pain  10/17/2020 0743 by Kamari Smith RN  Outcome: Ongoing  10/16/2020 2301 by Barb Cope RN  Outcome: Ongoing     Problem: Breathing Pattern - Ineffective:  Goal: Ability to achieve and maintain a regular respiratory rate will improve  Description: Ability to achieve and maintain a regular respiratory rate will improve  10/17/2020 0743 by Kamari Smith RN  Outcome: Ongoing  10/16/2020 2301 by Barb Cope RN  Outcome: Ongoing     Problem: Gas Exchange - Impaired:  Goal: Levels of oxygenation will improve  Description: Levels of oxygenation will improve  10/17/2020 0743 by Kamari Smith RN  Outcome: Ongoing  10/16/2020 2301 by Barb Cope RN  Outcome: Ongoing     Problem: Skin Integrity:  Goal: Will show no infection signs and symptoms  Description: Will show no infection signs and symptoms  10/17/2020 0743 by Kamari Smith RN  Outcome: Ongoing  10/16/2020 2301 by Barb Cope RN  Outcome: Ongoing  Goal: Absence of new skin breakdown  Description: Absence of new skin breakdown  10/17/2020 0743 by Kamari Smith RN  Outcome: Ongoing  10/16/2020 2309 by Sandro Crawford, RN  Outcome: Ongoing

## 2020-10-17 NOTE — PROGRESS NOTES
Physical Therapy  DATE: 10/17/2020    NAME: Jerrica Briseno  MRN: [id number]   : 1970    Patient not seen this date for Physical Therapy due to:  [] Blood transfusion in progress  [] Hemodialysis  []  Patient Declined  [] Spine Precautions   [] Strict Bedrest  [] Surgery/ Procedure  [] Testing      [x] Other Per RN, pt not medically appropriate for PT at this time. On Bipap       [] PT being discontinued at this time. Patient independent. No further needs. [] PT being discontinued at this time as the patient has been transferred to palliative care. No further needs.     Angela Ascencio, PTA

## 2020-10-17 NOTE — PLAN OF CARE
BRONCHOSPASM/BRONCHOCONSTRICTION     [x]         IMPROVE AERATION/BREATH SOUNDS  [x]   ADMINISTER BRONCHODILATOR THERAPY AS APPROPRIATE  [x]   ASSESS BREATH SOUNDS  [x]   IMPLEMENT AEROSOL/MDI PROTOCOL  [x]   PATIENT EDUCATION AS NEEDED     PROVIDE ADEQUATE OXYGENATION WITH ACCEPTABLE SP02/ABG'S    [x]  IDENTIFY APPROPRIATE OXYGEN THERAPY  [x]   MONITOR SP02/ABG'S AS NEEDED   [x]   PATIENT EDUCATION AS NEEDED     NON INVASIVE VENTILATION  PROVIDE OPTIMAL VENTILATION/ACCEPTABLE SP02  IMPLEMENT NON INVASIVE VENTILATION PROTOCOL  ASSESSMENT SKIN INTEGRITY  PATIENT EDUCATION AS NEEDED  BIPAP AS NEEDED

## 2020-10-18 LAB
ABSOLUTE EOS #: 0.03 K/UL (ref 0–0.44)
ABSOLUTE IMMATURE GRANULOCYTE: 0.07 K/UL (ref 0–0.3)
ABSOLUTE LYMPH #: 1.1 K/UL (ref 1.1–3.7)
ABSOLUTE MONO #: 0.99 K/UL (ref 0.1–1.2)
ALBUMIN SERPL-MCNC: 1.9 G/DL (ref 3.5–5.2)
ALBUMIN/GLOBULIN RATIO: 0.6 (ref 1–2.5)
ALP BLD-CCNC: 214 U/L (ref 35–104)
ALT SERPL-CCNC: 13 U/L (ref 5–33)
ANION GAP SERPL CALCULATED.3IONS-SCNC: 15 MMOL/L (ref 9–17)
AST SERPL-CCNC: 38 U/L
BASOPHILS # BLD: 0 % (ref 0–2)
BASOPHILS ABSOLUTE: <0.03 K/UL (ref 0–0.2)
BILIRUB SERPL-MCNC: 0.61 MG/DL (ref 0.3–1.2)
BUN BLDV-MCNC: 11 MG/DL (ref 6–20)
BUN/CREAT BLD: ABNORMAL (ref 9–20)
CALCIUM SERPL-MCNC: 8 MG/DL (ref 8.6–10.4)
CHLORIDE BLD-SCNC: 101 MMOL/L (ref 98–107)
CO2: 24 MMOL/L (ref 20–31)
CREAT SERPL-MCNC: 0.32 MG/DL (ref 0.5–0.9)
DIFFERENTIAL TYPE: ABNORMAL
EOSINOPHILS RELATIVE PERCENT: 0 % (ref 1–4)
GFR AFRICAN AMERICAN: >60 ML/MIN
GFR NON-AFRICAN AMERICAN: >60 ML/MIN
GFR SERPL CREATININE-BSD FRML MDRD: ABNORMAL ML/MIN/{1.73_M2}
GFR SERPL CREATININE-BSD FRML MDRD: ABNORMAL ML/MIN/{1.73_M2}
GLUCOSE BLD-MCNC: 92 MG/DL (ref 70–99)
HCT VFR BLD CALC: 27.2 % (ref 36.3–47.1)
HEMOGLOBIN: 8.1 G/DL (ref 11.9–15.1)
IMMATURE GRANULOCYTES: 1 %
LYMPHOCYTES # BLD: 11 % (ref 24–43)
MAGNESIUM: 1.3 MG/DL (ref 1.6–2.6)
MCH RBC QN AUTO: 32 PG (ref 25.2–33.5)
MCHC RBC AUTO-ENTMCNC: 29.8 G/DL (ref 28.4–34.8)
MCV RBC AUTO: 107.5 FL (ref 82.6–102.9)
MONOCYTES # BLD: 10 % (ref 3–12)
NRBC AUTOMATED: 0 PER 100 WBC
PDW BLD-RTO: 16.8 % (ref 11.8–14.4)
PLATELET # BLD: 313 K/UL (ref 138–453)
PLATELET ESTIMATE: ABNORMAL
PMV BLD AUTO: 11.8 FL (ref 8.1–13.5)
POTASSIUM SERPL-SCNC: 2.9 MMOL/L (ref 3.7–5.3)
RBC # BLD: 2.53 M/UL (ref 3.95–5.11)
RBC # BLD: ABNORMAL 10*6/UL
SEG NEUTROPHILS: 78 % (ref 36–65)
SEGMENTED NEUTROPHILS ABSOLUTE COUNT: 7.96 K/UL (ref 1.5–8.1)
SODIUM BLD-SCNC: 140 MMOL/L (ref 135–144)
TOTAL PROTEIN: 5.1 G/DL (ref 6.4–8.3)
WBC # BLD: 10.2 K/UL (ref 3.5–11.3)
WBC # BLD: ABNORMAL 10*3/UL

## 2020-10-18 PROCEDURE — 90792 PSYCH DIAG EVAL W/MED SRVCS: CPT | Performed by: PSYCHIATRY & NEUROLOGY

## 2020-10-18 PROCEDURE — 97110 THERAPEUTIC EXERCISES: CPT

## 2020-10-18 PROCEDURE — 83735 ASSAY OF MAGNESIUM: CPT

## 2020-10-18 PROCEDURE — 2580000003 HC RX 258: Performed by: INTERNAL MEDICINE

## 2020-10-18 PROCEDURE — 6370000000 HC RX 637 (ALT 250 FOR IP): Performed by: INTERNAL MEDICINE

## 2020-10-18 PROCEDURE — 97530 THERAPEUTIC ACTIVITIES: CPT

## 2020-10-18 PROCEDURE — 80053 COMPREHEN METABOLIC PANEL: CPT

## 2020-10-18 PROCEDURE — 94660 CPAP INITIATION&MGMT: CPT

## 2020-10-18 PROCEDURE — 94640 AIRWAY INHALATION TREATMENT: CPT

## 2020-10-18 PROCEDURE — 97116 GAIT TRAINING THERAPY: CPT

## 2020-10-18 PROCEDURE — 6360000002 HC RX W HCPCS: Performed by: INTERNAL MEDICINE

## 2020-10-18 PROCEDURE — 99233 SBSQ HOSP IP/OBS HIGH 50: CPT | Performed by: INTERNAL MEDICINE

## 2020-10-18 PROCEDURE — 94761 N-INVAS EAR/PLS OXIMETRY MLT: CPT

## 2020-10-18 PROCEDURE — 6370000000 HC RX 637 (ALT 250 FOR IP): Performed by: NURSE PRACTITIONER

## 2020-10-18 PROCEDURE — 2060000000 HC ICU INTERMEDIATE R&B

## 2020-10-18 PROCEDURE — 99232 SBSQ HOSP IP/OBS MODERATE 35: CPT | Performed by: INTERNAL MEDICINE

## 2020-10-18 PROCEDURE — 2580000003 HC RX 258: Performed by: NURSE PRACTITIONER

## 2020-10-18 PROCEDURE — 36415 COLL VENOUS BLD VENIPUNCTURE: CPT

## 2020-10-18 PROCEDURE — 6360000002 HC RX W HCPCS: Performed by: NURSE PRACTITIONER

## 2020-10-18 PROCEDURE — 2700000000 HC OXYGEN THERAPY PER DAY

## 2020-10-18 PROCEDURE — 85025 COMPLETE CBC W/AUTO DIFF WBC: CPT

## 2020-10-18 RX ORDER — AMOXICILLIN AND CLAVULANATE POTASSIUM 500; 125 MG/1; MG/1
1 TABLET, FILM COATED ORAL EVERY 8 HOURS SCHEDULED
Status: DISCONTINUED | OUTPATIENT
Start: 2020-10-18 | End: 2020-10-26 | Stop reason: HOSPADM

## 2020-10-18 RX ORDER — DULOXETIN HYDROCHLORIDE 20 MG/1
20 CAPSULE, DELAYED RELEASE ORAL DAILY
Status: DISCONTINUED | OUTPATIENT
Start: 2020-10-19 | End: 2020-10-19

## 2020-10-18 RX ORDER — OXYCODONE HYDROCHLORIDE AND ACETAMINOPHEN 5; 325 MG/1; MG/1
1 TABLET ORAL EVERY 6 HOURS PRN
Status: DISCONTINUED | OUTPATIENT
Start: 2020-10-18 | End: 2020-10-26 | Stop reason: HOSPADM

## 2020-10-18 RX ORDER — PREDNISONE 20 MG/1
40 TABLET ORAL DAILY
Status: DISCONTINUED | OUTPATIENT
Start: 2020-10-18 | End: 2020-10-22

## 2020-10-18 RX ORDER — ESCITALOPRAM OXALATE 10 MG/1
10 TABLET ORAL DAILY
Status: DISCONTINUED | OUTPATIENT
Start: 2020-10-19 | End: 2020-10-19

## 2020-10-18 RX ORDER — POTASSIUM CHLORIDE 20 MEQ/1
40 TABLET, EXTENDED RELEASE ORAL DAILY
Status: DISCONTINUED | OUTPATIENT
Start: 2020-10-18 | End: 2020-10-26 | Stop reason: HOSPADM

## 2020-10-18 RX ADMIN — IPRATROPIUM BROMIDE AND ALBUTEROL SULFATE 1 AMPULE: .5; 3 SOLUTION RESPIRATORY (INHALATION) at 15:13

## 2020-10-18 RX ADMIN — GABAPENTIN 200 MG: 100 CAPSULE ORAL at 14:08

## 2020-10-18 RX ADMIN — Medication 54 MG: at 14:10

## 2020-10-18 RX ADMIN — AMLODIPINE BESYLATE 5 MG: 10 TABLET ORAL at 09:04

## 2020-10-18 RX ADMIN — Medication 54 MG: at 20:29

## 2020-10-18 RX ADMIN — GABAPENTIN 200 MG: 100 CAPSULE ORAL at 20:29

## 2020-10-18 RX ADMIN — BUSPIRONE HYDROCHLORIDE 15 MG: 15 TABLET ORAL at 14:08

## 2020-10-18 RX ADMIN — PIPERACILLIN AND TAZOBACTAM 3.38 G: 3; .375 INJECTION, POWDER, FOR SOLUTION INTRAVENOUS at 04:47

## 2020-10-18 RX ADMIN — POTASSIUM CHLORIDE 20 MEQ: 1500 TABLET, EXTENDED RELEASE ORAL at 09:03

## 2020-10-18 RX ADMIN — SODIUM CHLORIDE, PRESERVATIVE FREE 10 ML: 5 INJECTION INTRAVENOUS at 09:04

## 2020-10-18 RX ADMIN — CHLORDIAZEPOXIDE HYDROCHLORIDE 10 MG: 5 CAPSULE ORAL at 20:29

## 2020-10-18 RX ADMIN — FERROUS SULFATE TAB EC 325 MG (65 MG FE EQUIVALENT) 325 MG: 325 (65 FE) TABLET DELAYED RESPONSE at 09:03

## 2020-10-18 RX ADMIN — SODIUM CHLORIDE, PRESERVATIVE FREE 10 ML: 5 INJECTION INTRAVENOUS at 20:31

## 2020-10-18 RX ADMIN — FAMOTIDINE 20 MG: 20 TABLET ORAL at 09:04

## 2020-10-18 RX ADMIN — ENOXAPARIN SODIUM 40 MG: 40 INJECTION SUBCUTANEOUS at 09:04

## 2020-10-18 RX ADMIN — METHYLPREDNISOLONE SODIUM SUCCINATE 40 MG: 40 INJECTION, POWDER, FOR SOLUTION INTRAMUSCULAR; INTRAVENOUS at 09:04

## 2020-10-18 RX ADMIN — LORAZEPAM 1 MG: 2 INJECTION INTRAMUSCULAR; INTRAVENOUS at 20:30

## 2020-10-18 RX ADMIN — CARBAMAZEPINE 200 MG: 100 TABLET, EXTENDED RELEASE ORAL at 09:04

## 2020-10-18 RX ADMIN — TRAZODONE HYDROCHLORIDE 100 MG: 100 TABLET ORAL at 20:28

## 2020-10-18 RX ADMIN — PROPRANOLOL HYDROCHLORIDE 40 MG: 40 TABLET ORAL at 09:02

## 2020-10-18 RX ADMIN — BUSPIRONE HYDROCHLORIDE 15 MG: 15 TABLET ORAL at 09:04

## 2020-10-18 RX ADMIN — CARBAMAZEPINE 200 MG: 100 TABLET, EXTENDED RELEASE ORAL at 20:29

## 2020-10-18 RX ADMIN — PIPERACILLIN AND TAZOBACTAM 3.38 G: 3; .375 INJECTION, POWDER, FOR SOLUTION INTRAVENOUS at 13:32

## 2020-10-18 RX ADMIN — Medication 54 MG: at 11:25

## 2020-10-18 RX ADMIN — ROPINIROLE HYDROCHLORIDE 1 MG: 1 TABLET, FILM COATED ORAL at 09:03

## 2020-10-18 RX ADMIN — FOLIC ACID 1 MG: 1 TABLET ORAL at 09:03

## 2020-10-18 RX ADMIN — CHLORDIAZEPOXIDE HYDROCHLORIDE 10 MG: 5 CAPSULE ORAL at 14:06

## 2020-10-18 RX ADMIN — BUDESONIDE AND FORMOTEROL FUMARATE DIHYDRATE 2 PUFF: 160; 4.5 AEROSOL RESPIRATORY (INHALATION) at 20:31

## 2020-10-18 RX ADMIN — ESCITALOPRAM OXALATE 30 MG: 10 TABLET ORAL at 09:04

## 2020-10-18 RX ADMIN — CHLORDIAZEPOXIDE HYDROCHLORIDE 10 MG: 5 CAPSULE ORAL at 09:12

## 2020-10-18 RX ADMIN — GABAPENTIN 200 MG: 100 CAPSULE ORAL at 09:03

## 2020-10-18 RX ADMIN — FERROUS SULFATE TAB EC 325 MG (65 MG FE EQUIVALENT) 325 MG: 325 (65 FE) TABLET DELAYED RESPONSE at 20:29

## 2020-10-18 RX ADMIN — PREDNISONE 40 MG: 20 TABLET ORAL at 18:12

## 2020-10-18 RX ADMIN — BUSPIRONE HYDROCHLORIDE 15 MG: 15 TABLET ORAL at 20:29

## 2020-10-18 RX ADMIN — Medication 100 MG: at 09:03

## 2020-10-18 RX ADMIN — ROPINIROLE HYDROCHLORIDE 1 MG: 1 TABLET, FILM COATED ORAL at 23:09

## 2020-10-18 RX ADMIN — HYDROXYZINE HYDROCHLORIDE 50 MG: 25 TABLET, FILM COATED ORAL at 20:28

## 2020-10-18 RX ADMIN — CARBAMAZEPINE 200 MG: 100 TABLET, EXTENDED RELEASE ORAL at 14:08

## 2020-10-18 RX ADMIN — FAMOTIDINE 20 MG: 20 TABLET ORAL at 20:28

## 2020-10-18 RX ADMIN — HYDROXYZINE HYDROCHLORIDE 50 MG: 25 TABLET, FILM COATED ORAL at 09:03

## 2020-10-18 RX ADMIN — BUSPIRONE HYDROCHLORIDE 15 MG: 15 TABLET ORAL at 01:56

## 2020-10-18 RX ADMIN — IPRATROPIUM BROMIDE AND ALBUTEROL SULFATE 1 AMPULE: .5; 3 SOLUTION RESPIRATORY (INHALATION) at 07:51

## 2020-10-18 RX ADMIN — POTASSIUM CHLORIDE 40 MEQ: 1500 TABLET, EXTENDED RELEASE ORAL at 12:05

## 2020-10-18 RX ADMIN — PROPRANOLOL HYDROCHLORIDE 40 MG: 40 TABLET ORAL at 20:28

## 2020-10-18 RX ADMIN — THERA TABS 1 TABLET: TAB at 09:03

## 2020-10-18 RX ADMIN — IPRATROPIUM BROMIDE AND ALBUTEROL SULFATE 1 AMPULE: .5; 3 SOLUTION RESPIRATORY (INHALATION) at 20:15

## 2020-10-18 RX ADMIN — AMOXICILLIN AND CLAVULANATE POTASSIUM 1 TABLET: 500; 125 TABLET, FILM COATED ORAL at 18:12

## 2020-10-18 RX ADMIN — BUDESONIDE AND FORMOTEROL FUMARATE DIHYDRATE 2 PUFF: 160; 4.5 AEROSOL RESPIRATORY (INHALATION) at 07:52

## 2020-10-18 RX ADMIN — HYDROXYZINE HYDROCHLORIDE 50 MG: 25 TABLET, FILM COATED ORAL at 14:08

## 2020-10-18 RX ADMIN — IPRATROPIUM BROMIDE AND ALBUTEROL SULFATE 1 AMPULE: .5; 3 SOLUTION RESPIRATORY (INHALATION) at 11:27

## 2020-10-18 ASSESSMENT — PAIN DESCRIPTION - ONSET
ONSET: ON-GOING

## 2020-10-18 ASSESSMENT — PAIN - FUNCTIONAL ASSESSMENT
PAIN_FUNCTIONAL_ASSESSMENT: PREVENTS OR INTERFERES SOME ACTIVE ACTIVITIES AND ADLS
PAIN_FUNCTIONAL_ASSESSMENT: PREVENTS OR INTERFERES SOME ACTIVE ACTIVITIES AND ADLS

## 2020-10-18 ASSESSMENT — PAIN DESCRIPTION - ORIENTATION
ORIENTATION: RIGHT;LEFT
ORIENTATION: RIGHT;LEFT

## 2020-10-18 ASSESSMENT — ENCOUNTER SYMPTOMS
WHEEZING: 0
CHEST TIGHTNESS: 0
NAUSEA: 0
CHOKING: 0
ABDOMINAL PAIN: 0
BLOOD IN STOOL: 0
DIARRHEA: 0
VOMITING: 0
SHORTNESS OF BREATH: 1
CONSTIPATION: 0
ANAL BLEEDING: 0
COUGH: 1

## 2020-10-18 ASSESSMENT — PAIN DESCRIPTION - PAIN TYPE
TYPE: CHRONIC PAIN

## 2020-10-18 ASSESSMENT — PAIN DESCRIPTION - PROGRESSION
CLINICAL_PROGRESSION: NOT CHANGED
CLINICAL_PROGRESSION: NOT CHANGED

## 2020-10-18 ASSESSMENT — PAIN DESCRIPTION - DESCRIPTORS
DESCRIPTORS: SHARP;SHOOTING;STABBING
DESCRIPTORS: SHARP;SORE
DESCRIPTORS: SHARP;SORE

## 2020-10-18 ASSESSMENT — PAIN DESCRIPTION - LOCATION
LOCATION: FOOT;LEG
LOCATION: FOOT
LOCATION: FOOT

## 2020-10-18 ASSESSMENT — PAIN SCALES - GENERAL
PAINLEVEL_OUTOF10: 8

## 2020-10-18 ASSESSMENT — PAIN DESCRIPTION - FREQUENCY
FREQUENCY: INTERMITTENT

## 2020-10-18 ASSESSMENT — VISUAL ACUITY: OU: 1
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
## 2020-10-18 NOTE — PROGRESS NOTES
[facility]  Office: Catracho Alvarez, DO, Mona Staff, DO, Amrit Hart, DO, Constantin Price Blood, DO, Zane Calle MD, Vipul Snyder MD, Everton Garcia MD, Izaiah Gruber MD, Mel Gilman MD, Benjamin Irby MD, Marlo Arrieta MD, Shadia Moore MD, Mary Agustin MD, Radha Dobson, DO, Nguyễn Jason MD, Jason Scott MD, Dereck Logan DO, Xiomara Sears MD,  Mendez Douglas, DO, Cb Edmond MD, Adolfo Jackson MD, Fransico Hood, CNP, [address], [facility], Angelique Duff, CNP, Deja Aguilera, CNS, Karin Weldon, CNP, Nitza Garcia, CNP, Eva Corona, CNP, Darwin Logan, CNP, Tonie Barnett, CNP, Rosalva Britt PA-C, Kyler Guevara, ERIK, Wilber Conley, CNP, Jose Francisco, CNP, Jairo Eid, CNP, Geovanni Loaiza, CNP, Yaima Dials, [facility]    Progress Note    Name:   Richy Patel  MRN:     [id number]     Acct:      [de-identified]   Room:   2003/2003-01  IP Day:  6  Admit Date:  10/12/2020  7:48 PM    PCP:   Rand Pedersen MD  Code Status:  Full Code    Subjective:     C/C:   Chief Complaint   Patient presents with    Shortness of Breath     Interval History Status: improved. Patient in bed, off biPAP. Maintaining O2 sats on 4 L nasal cannula oxygen. Received another dose of IV Lasix yesterday per pulmonary. Is more awake, alert and talking this morning. Afebrile, hemodynamically stable. RN reports poor oral intake this morning. CBC CMP reviewed from this morning: Potassium 2.9, alk phos 214 improved from 319, total protein 5.1, hemoglobin 8.1  Echocardiogram 10/17/2020 unremarkable with preserved EF. Brief History:   71-year-old brought in by family after being noted to be hypoxic at her PCPs office. Patient does complain of productive cough, bilateral lower extremity painx 2-3 months but worse in the last month. Chronic unchanged shortness of breath going on for more than a year.   Has been using albuterol inhaler as needed. Continues to smoke about 1 pack/day. Drinks alcohol every day: Rum. Known history of hypertension, depression, COPD, excision of Astrocytoma. - ED evaluation showed patient was afebrile, hypoxic to 84% on room air, intoxicated with alcohol level 244, anemic hemoglobin 9.4, COVID rapid NAAT:  Negative, chest x-ray was unremarkable no pneumonia, high sensitive troponin 9, EKG with nonspecific ST-T changes. Lower extremity venous Dopplers negative for DVT. Review of Systems:     Constitutional:  negative for chills, fevers, sweats  Respiratory:  +cough, no dyspnea on exertion,+ shortness of breath,+ wheezing  Cardiovascular:  negative for chest pain, chest pressure/discomfort, lower extremity edema, palpitations  Gastrointestinal:  negative for abdominal pain, constipation, diarrhea, nausea, vomiting  Neurological:  negative for dizziness, headache  Medications:      Allergies:  No Known Allergies    Current Meds:   Scheduled Meds:    potassium chloride  40 mEq Oral Daily    [START ON 10/19/2020] escitalopram  10 mg Oral Daily    chlordiazePOXIDE  10 mg Oral TID    methylPREDNISolone  40 mg Intravenous Daily    ipratropium-albuterol  1 ampule Inhalation Q4H WA    piperacillin-tazobactam  3.375 g Intravenous Q8H    amLODIPine  5 mg Oral Daily    busPIRone  15 mg Oral Q6H    carBAMazepine  200 mg Oral TID    ferrous sulfate  325 mg Oral BID    folic acid  1 mg Oral Daily    gabapentin  200 mg Oral TID    hydrOXYzine  50 mg Oral TID    magnesium carbonate  54 mg Oral TID    propranolol  40 mg Oral BID    rOPINIRole  1 mg Oral Nightly    traZODone  100 mg Oral Nightly    vitamin B-1  100 mg Oral Daily    sodium chloride flush  10 mL Intravenous 2 times per day    famotidine  20 mg Oral BID    enoxaparin  40 mg Subcutaneous Daily    multivitamin  1 tablet Oral Daily    budesonide-formoterol  2 puff Inhalation BID     Continuous Infusions:     PRN Meds: previous. Xr Chest Portable    Result Date: 10/12/2020  Negative chest.       Physical Examination:        General appearance: Awake alert following commands this morning not in any distress  Mental Status:  oriented to person, place and time. Lungs: good b/l air entry +diffuse wheeze, right lower lobe rhonchi  heart:  regular rate and rhythm, no murmur  Abdomen:  soft, nontender, nondistended, normal bowel sounds, no masses, hepatomegaly, splenomegaly  Extremities:  no edema, redness, tenderness in the calves  Skin:  no gross lesions, rashes, induration    Assessment:        Hospital Problems           Last Modified POA    * (Principal) [facility] 10/13/2020 Yes    Essential hypertension 10/13/2020 Yes    Depression 10/13/2020 Yes    Alcohol withdrawal syndrome without complication ([facility] 63/36/7873 Yes    Paresthesia of lower extremity 10/13/2020 Yes    Acute respiratory failure with hypoxia ([facility] 10/16/2020 Yes        Plan:      -Continue empiric antibiotics- zosyn day4 : We will switch to Augmentin for total 10 days of antibiotics. Switch steroids to p.o.  -Encourage oral intake.  -Acute noncardiogenic pulmonary edema: No further dosing of diuretics.  -Continue home dose of antidepressants antihypertensives and Neurontin.  -Continue [id number] TID and Ativan 1 mg every 6 as needed. .   -Increase p.o. potassium. Continue to monitor and replace as needed. -Psych consult pending discussed again with RN:  -DVT/GI prophylaxis. -Full code.      Sepideh Ayala MD  10/18/2020

## 2020-10-18 NOTE — PROGRESS NOTES
Physical Therapy  Facility/Department: [facility] [place] [facility] 2  Daily Treatment Note  NAME: Chiki Saenz  : 1970  MRN: [id number]    Date of Service: 10/18/2020    Discharge Recommendations:  Patient would benefit from continued therapy after discharge   PT Equipment Recommendations  Equipment Needed: Yes  Mobility Devices: Richie Ron: Rolling    Assessment   Body structures, Functions, Activity limitations: Decreased functional mobility ; Decreased strength;Decreased endurance;Decreased balance  Assessment: Pt grossly Santy to [facility] for bed mobility and Santy for functional transfers and ambulation. Pt most limitedby decreased endurance, currently not safe to perform any aspect of mobility without physical assistance. Pt would benefit from continued acute PT to address deficits. Prognosis: Good  PT Education: Plan of Care;PT Role;General Safety;Transfer Training;Gait Training;Functional Mobility Training;Energy Conservation  REQUIRES PT FOLLOW UP: Yes  Activity Tolerance  Activity Tolerance: Patient limited by fatigue;Patient limited by endurance     Patient Diagnosis(es): The primary encounter diagnosis was COPD exacerbation ([facility] Diagnoses of Hypoxia and Acute alcoholic intoxication without complication ([address] were also pertinent to this visit. has a past medical history of Closed fracture of lateral portion of left tibial plateau, COPD (chronic obstructive pulmonary disease) ([address] Depression, Hypertension, and Pain, joint, ankle and foot. has a past surgical history that includes Hysterectomy (); Brain tumor excision (); fracture surgery (Left, 7-3-13); and fracture surgery (Right). Restrictions  Restrictions/Precautions  Restrictions/Precautions: General Precautions, Fall Risk, Up as Tolerated, Seizure  Required Braces or Orthoses?: No  Subjective   General  Response To Previous Treatment: Patient reporting fatigue but able to participate.   Family / Caregiver Present: No  Subjective  Subjective: RN and pt agreeable to PT. Pt alert in bed upon arrival, requests assistance with bedpan. Pt c/o bilat foot pain and generalized fatigue  General Comment  Comments: Pt left in bed with call light within reach, alarm activated  Pain Screening  Patient Currently in Pain: Yes  Pain Assessment  Pain Assessment: 0-10  Pain Level: 8  Pain Type: Chronic pain  Pain Location: Foot  Pain Orientation: Right;Left  Pain Descriptors: Ulysees Hank; Sore  Pain Frequency: Intermittent  Pain Onset: On-going  Clinical Progression: Not changed  Functional Pain Assessment: Prevents or interferes some active activities and ADLs  Non-Pharmaceutical Pain Intervention(s): Ambulation/Increased Activity;Repositioned  Vital Signs  Patient Currently in Pain: Yes       Orientation  Orientation  Overall Orientation Status: Within Functional Limits  Cognition      Objective   Bed mobility  Bridging: Contact guard assistance  Rolling to Left: Contact guard assistance  Rolling to Right: Contact guard assistance  Supine to Sit: Minimal assistance  Sit to Supine: Minimal assistance  Scooting: Contact guard assistance  Comment: HOB elevated, high reliance on bedrails. Incerased time to complete  Transfers  Sit to Stand: Contact guard assistance  Stand to sit: Contact guard assistance  Comment: STS to RW  Ambulation  Ambulation?: Yes  Ambulation 1  Surface: level tile  Device: Rolling Walker  Other Apparatus: O2(4L NC)  Assistance: Minimal assistance  Quality of Gait: very decreased endurance, slow shuffling gait, unsteady  Gait Deviations: Slow Leticia; Shuffles;Decreased step length;Decreased step height  Distance: 4 lateral steps to [facility]  Comments: pt unable to tolerate standing >20 secs, reports feeling \"shakey\"  Stairs/Curb  Stairs?: No     Balance  Posture: Fair  Sitting - Static: Good  Sitting - Dynamic: Good;-  Standing - Static: Fair(static standing x 3 trials ~20secs each with extended rest breaks between stands)  Standing - Dynamic: Fair  Comments: RW used while assessing standing balance  Exercises  Supine Exercises: Ankle Pumps, Gluteal sets, Hamstring Sets, Hip ABD/ADD, Quad Sets, SLR. Reps: 10x   Seated LE exercise program: [employer], and [facility] [address]  Reps: 10x   Comments:     Goals  Short term goals  Time Frame for Short term goals: 14 visits  Short term goal 1: Pt will be Sneha bed mobility  Short term goal 2: Pt will be S[place]ha transfers  Short term goal 3: Pt will be S[place]ha amb 300' RW  Short term goal 4: Pt will navigate 2 steps Sneha    Plan    Plan  Times per week: 5-6x/wk  Current Treatment Recommendations: Strengthening, Functional Mobility Training, Transfer Training, Balance Training, Endurance Training, Gait Training, Stair training, Home Exercise Program, Safety Education & Training, Patient/Caregiver Education & Training, Equipment Evaluation, Education, & procurement  Safety Devices  Type of devices: Call light within reach, Nurse notified, Gait belt, Patient at risk for falls, All fall risk precautions in place, Bed alarm in place, Left in bed  Restraints  Initially in place: No     Therapy Time   Individual Concurrent Group Co-treatment   Time In 1003         Time Out 1046         Minutes 43         Timed Code Treatment Minutes: [address] [place], [place]
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
## 2020-10-18 NOTE — PROGRESS NOTES
Writer tried multi times to get tele health psych to work keep saying low single and video was very slow \"buffering\", dr and nurse agreed with delisa tomorrow Monday @1130 am

## 2020-10-18 NOTE — PROGRESS NOTES
Pt is continually pulling Bipap off of her face and is hallucinating. Pt said that she was going to [employer]. RN notified. Will continue to assess Pt status and communicate findings.

## 2020-10-18 NOTE — PLAN OF CARE
Michelle Springer[facility]ation ([facility] [J44.1] . Vitals:    10/18/20 0505   BP:    Pulse:    Resp: 20   Temp:    SpO2:    . Patients home meds are   Prior to Admission medications    Medication Sig Start Date End Date Taking?  Authorizing Provider   tiotropium (SPIRIVA RESPIMAT) 1.25 MCG/ACT AERS inhaler Inhale 2 puffs into the lungs daily 10/12/20   Ludivina Mejia MD   tiZANidine (ZANAFLEX) 4 MG tablet Take 1 tablet by mouth every 8 hours as needed (back pain) 10/12/20   Ludivina Mejia MD   potassium chloride (KLOR-CON M) 20 MEQ extended release tablet Take 1 tablet by mouth daily 9/10/20   LOI Navarro CNP   magnesium carbonate (MAGONATE) 54 (Mag Equiv) MG/5ML LIQD oral liquid Take 5 mLs by mouth 3 times daily 7/16/20   Ludivina Mejia MD   ferrous sulfate (IRON 325) 325 (65 Fe) MG tablet Take 1 tablet by mouth 2 times daily 7/16/20   Ludivina Mejia MD   rOPINIRole (REQUIP) 1 MG tablet Take 1 tablet by mouth nightly 7/8/20   Ludivina Mejia MD   ACETAMINOPHEN EXTRA STRENGTH 500 MG tablet Take 500 mg by mouth 3 times daily as needed 5/8/20   Historical Provider, MD   ibuprofen (ADVIL;MOTRIN) 800 MG tablet Take 800 mg by mouth 3 times daily as needed 5/15/20   Historical Provider, MD   propranolol (INDERAL) 40 MG tablet Take 40 mg by mouth 2 times daily 5/12/20   Historical Provider, MD   albuterol sulfate HFA (VENTOLIN HFA) 108 (90 Base) MCG/ACT inhaler Inhale 2 puffs into the lungs 4 times daily as needed for Wheezing 6/11/20   Ludivina Mejia MD   busPIRone (BUSPAR) 15 MG tablet Take 15 mg by mouth every 6 hours 6/11/20   Ludivina Mejia MD   escitalopram (LEXAPRO) 20 MG tablet Take 1.5 tablets by mouth daily 6/11/20   Ludivina Mejia MD   traZODone (DESYREL) 100 MG tablet Take 1 tablet by mouth nightly 6/11/20   Ludivina Mejia MD   carBAMazepine (TEGRETOL XR) 200 MG extended release tablet Take 1 tablet by mouth 3 times daily 6/11/20   Ludivina Aminata Saeed MD   amLODIPine (NORVASC) 5 MG tablet Take 1 tablet by mouth daily 6/11/20   Ludivina Saeed MD   gabapentin (NEURONTIN) 100 MG capsule Take 2 capsules by mouth 3 times daily for 180 days.  Intended supply: 90 days 6/11/20 12/8/20  Ludivina Saeed MD   hydrOXYzine (ATARAX) 50 MG tablet Take 1 tablet by mouth 3 times daily 6/11/20   Ludivina Saeed MD   acamprosate (CAMPRAL) 333 MG tablet Take 2 tablets by mouth 3 times daily 12/10/19 10/12/20  Annamary Dance, APRN - NP   vitamin B-1 (THIAMINE) 100 MG tablet Take 1 tablet by mouth daily 7/12/19   Ludivina Saeed MD   vitamin D (ERGOCALCIFEROL) 06780 units CAPS capsule Take 1 capsule by mouth once a week 6/19/19   Ludivina Saeed MD   folic acid (FOLVITE) 1 MG tablet Take 1 tablet by mouth daily 6/19/19   Renetta Lee MD   .       Assessment   Pt states she does not wear O2 at home  Pt states she does not wear a CPAP at home  Pt states she does not take at home medication for breathing at home     RR 19  Breath Sounds: clear, diminished      Bronchodilator assessment at level  3  Hyperinflation assessment at level   Secretion Management assessment at level      [x]    Bronchodilator Assessment  BRONCHODILATOR ASSESSMENT SCORE  Score 0 1 2 3 4 5   Breath Sounds   []  Patient Baseline [x]  No Wheeze good aeration []  Faint, scattered wheezing, good aeration []  Expiratory Wheezing and or moderately diminished []  Insp/Exp wheeze and/or very diminished []  Insp/Exp and/ or marked distress   Respiratory Rate   []  Patient Baseline [x]  Less than 20 [x]  Less than 20 []  20-25 []  Greater than 25 []  Greater than 25   Peak flow % of Pred or PB [x]  NA   []  Greater than 90%  []  81-90% []  71-80% []  Less than or equal to 70%  or unable to perform []  Unable due to Respiratory Distress   Dyspnea re []  Patient Baseline []  No SOB []  No SOB [x]  SOB on exertion []  SOB min activity []  At rest/acute   e FEV% Predicted       [x]  NA []  Above 2. 38 2.57 2.75 2.95 3.14 3.35 3.56 54 - 57 2.46 2.67 2.89 3.12 3.36 3.60 3.85 4.11   58 - 61 2.10 2.28 2.46 2.65 2.84 3.04 3.24 3.45 58 - 61 2.32 2.54 2.76 2.99 3.23 3.47 3.72 3.98   62 - 65 1.99 2.17 2.35 2.54 2.73 2.93 3.13 3.34 62 - 65 2.19 2.40 2.62 2.85 3.09 3.33 3.58 3.84   66 - 69 1.88 2.05 2.23 2.42 2.61 2.81 3.02 3.23 66 - 69 2.04 2.26 2.48 2.71 2.95 3.19 3.44 3.70   70+ 1.82 1.99 2.17 2.36 2.55 2.75 2.95 3.16 70+ 1.97 2.19 2.41 2.64 2.87 3.12 3.37 3.62             Predicted Peak Expiratory Flow Rate                                       Height (in)  Female       Height (in) Male           Age 64 63 56 61 58 73 78 74 Age            20 344 357 372 387 402 417 432 446  60 62 64 66 68 70 72 74 76   25 337 352 366 381 396 411 426 441 25 447 476 505 533 562 591 619 648 677   30 329 344 359 374 389 404 419 434 30 437 466 494 523 552 580 609 638 667   35 322 337 351 366 381 396 411 426 35 426 455 484 512 541 570 598 627 657   40 314 329 344 359 374 389 404 419 40 416 445 473 502 531 559 588 617 647   45 307 322 336 351 366 381 396 411 45 405 434 463 491 520 549 577 606 636   50 299 314 329 344 359 374 389 404 50 395 424 452 481 510 538 567 596 625   55 292 307 321 336 351 366 381 396 55 384 413 442 470 499 528 556 585 615   60 284 299 314 329 344 359 374 389 60 374 403 431 460 489 517 546 575 605   65 277 292 306 321 336 351 366 381 65 363 392 421 449 478 507 535 564 594   70 269 284 299 314 329 344 359 374 70 353 382 410 439 468 496 525 554 583   75 261 274 289 305 319 334 348 364 75 344 372 400 429 458 487 515 544 573   80 253 266 282 296 312 327 342 356 80 335 362 390 419 448 476 [phone]

## 2020-10-18 NOTE — CARE COORDINATION
Transitional planning. This transpired yesterday late afternoon. Pts Mom in room with sisters on conference call on speaker while writer in room. They all agree SNF is appropriate. List of SNF given to Mom.

## 2020-10-19 ENCOUNTER — APPOINTMENT (OUTPATIENT)
Dept: MRI IMAGING | Age: 50
DRG: 190 | End: 2020-10-19
Payer: COMMERCIAL

## 2020-10-19 ENCOUNTER — APPOINTMENT (OUTPATIENT)
Dept: ULTRASOUND IMAGING | Age: 50
DRG: 190 | End: 2020-10-19
Payer: COMMERCIAL

## 2020-10-19 ENCOUNTER — TELEPHONE (OUTPATIENT)
Dept: FAMILY MEDICINE CLINIC | Age: 50
End: 2020-10-19

## 2020-10-19 PROBLEM — K86.2 PANCREATIC CYST: Status: ACTIVE | Noted: 2020-10-19

## 2020-10-19 LAB
ABSOLUTE EOS #: 0.04 K/UL (ref 0–0.44)
ABSOLUTE IMMATURE GRANULOCYTE: 0.04 K/UL (ref 0–0.3)
ABSOLUTE LYMPH #: 1.46 K/UL (ref 1.1–3.7)
ABSOLUTE MONO #: 1.07 K/UL (ref 0.1–1.2)
AFP: 5.1 UG/L
ALBUMIN SERPL-MCNC: 2 G/DL (ref 3.5–5.2)
ALBUMIN/GLOBULIN RATIO: 0.6 (ref 1–2.5)
ALP BLD-CCNC: 215 U/L (ref 35–104)
ALT SERPL-CCNC: 13 U/L (ref 5–33)
ANION GAP SERPL CALCULATED.3IONS-SCNC: 10 MMOL/L (ref 9–17)
ANION GAP SERPL CALCULATED.3IONS-SCNC: 11 MMOL/L (ref 9–17)
AST SERPL-CCNC: 35 U/L
BASOPHILS # BLD: 0 % (ref 0–2)
BASOPHILS ABSOLUTE: <0.03 K/UL (ref 0–0.2)
BILIRUB SERPL-MCNC: 0.51 MG/DL (ref 0.3–1.2)
BUN BLDV-MCNC: 9 MG/DL (ref 6–20)
BUN BLDV-MCNC: 9 MG/DL (ref 6–20)
BUN/CREAT BLD: ABNORMAL (ref 9–20)
BUN/CREAT BLD: ABNORMAL (ref 9–20)
CA 19-9: 123 U/ML (ref 0–35)
CALCIUM SERPL-MCNC: 7.8 MG/DL (ref 8.6–10.4)
CALCIUM SERPL-MCNC: 7.9 MG/DL (ref 8.6–10.4)
CHLORIDE BLD-SCNC: 105 MMOL/L (ref 98–107)
CHLORIDE BLD-SCNC: 106 MMOL/L (ref 98–107)
CO2: 24 MMOL/L (ref 20–31)
CO2: 25 MMOL/L (ref 20–31)
CREAT SERPL-MCNC: 0.22 MG/DL (ref 0.5–0.9)
CREAT SERPL-MCNC: <0.2 MG/DL (ref 0.5–0.9)
DATE, STOOL #1: ABNORMAL
DATE, STOOL #2: ABNORMAL
DATE, STOOL #3: ABNORMAL
DIFFERENTIAL TYPE: ABNORMAL
EOSINOPHILS RELATIVE PERCENT: 0 % (ref 1–4)
GFR AFRICAN AMERICAN: >60 ML/MIN
GFR AFRICAN AMERICAN: ABNORMAL ML/MIN
GFR NON-AFRICAN AMERICAN: >60 ML/MIN
GFR NON-AFRICAN AMERICAN: ABNORMAL ML/MIN
GFR SERPL CREATININE-BSD FRML MDRD: ABNORMAL ML/MIN/{1.73_M2}
GLUCOSE BLD-MCNC: 100 MG/DL (ref 70–99)
GLUCOSE BLD-MCNC: 132 MG/DL (ref 70–99)
HCT VFR BLD CALC: 26.4 % (ref 36.3–47.1)
HCT VFR BLD CALC: 27 % (ref 36.3–47.1)
HCT VFR BLD CALC: 27.1 % (ref 36.3–47.1)
HCT VFR BLD CALC: 27.3 % (ref 36.3–47.1)
HCT VFR BLD CALC: 27.8 % (ref 36.3–47.1)
HEMOCCULT SP1 STL QL: POSITIVE
HEMOCCULT SP2 STL QL: ABNORMAL
HEMOCCULT SP3 STL QL: ABNORMAL
HEMOGLOBIN: 7.9 G/DL (ref 11.9–15.1)
HEMOGLOBIN: 8.3 G/DL (ref 11.9–15.1)
HEMOGLOBIN: 8.3 G/DL (ref 11.9–15.1)
HEMOGLOBIN: 8.4 G/DL (ref 11.9–15.1)
HEMOGLOBIN: 8.4 G/DL (ref 11.9–15.1)
IMMATURE GRANULOCYTES: 0 %
LYMPHOCYTES # BLD: 16 % (ref 24–43)
MAGNESIUM: 1.3 MG/DL (ref 1.6–2.6)
MAGNESIUM: 1.5 MG/DL (ref 1.6–2.6)
MCH RBC QN AUTO: 32.8 PG (ref 25.2–33.5)
MCHC RBC AUTO-ENTMCNC: 29.9 G/DL (ref 28.4–34.8)
MCV RBC AUTO: 109.9 FL (ref 82.6–102.9)
MONOCYTES # BLD: 12 % (ref 3–12)
NRBC AUTOMATED: 0 PER 100 WBC
PDW BLD-RTO: 17 % (ref 11.8–14.4)
PLATELET # BLD: 344 K/UL (ref 138–453)
PLATELET ESTIMATE: ABNORMAL
PMV BLD AUTO: 11.1 FL (ref 8.1–13.5)
POTASSIUM SERPL-SCNC: 3 MMOL/L (ref 3.7–5.3)
POTASSIUM SERPL-SCNC: 3.3 MMOL/L (ref 3.7–5.3)
RBC # BLD: 2.53 M/UL (ref 3.95–5.11)
RBC # BLD: ABNORMAL 10*6/UL
SEG NEUTROPHILS: 72 % (ref 36–65)
SEGMENTED NEUTROPHILS ABSOLUTE COUNT: 6.54 K/UL (ref 1.5–8.1)
SODIUM BLD-SCNC: 140 MMOL/L (ref 135–144)
SODIUM BLD-SCNC: 141 MMOL/L (ref 135–144)
TIME, STOOL #1: ABNORMAL
TIME, STOOL #2: ABNORMAL
TIME, STOOL #3: ABNORMAL
TOTAL PROTEIN: 5.1 G/DL (ref 6.4–8.3)
WBC # BLD: 9.2 K/UL (ref 3.5–11.3)
WBC # BLD: ABNORMAL 10*3/UL

## 2020-10-19 PROCEDURE — 85025 COMPLETE CBC W/AUTO DIFF WBC: CPT

## 2020-10-19 PROCEDURE — 80048 BASIC METABOLIC PNL TOTAL CA: CPT

## 2020-10-19 PROCEDURE — 6370000000 HC RX 637 (ALT 250 FOR IP): Performed by: NURSE PRACTITIONER

## 2020-10-19 PROCEDURE — 6370000000 HC RX 637 (ALT 250 FOR IP): Performed by: PSYCHIATRY & NEUROLOGY

## 2020-10-19 PROCEDURE — 80053 COMPREHEN METABOLIC PANEL: CPT

## 2020-10-19 PROCEDURE — 83735 ASSAY OF MAGNESIUM: CPT

## 2020-10-19 PROCEDURE — G0328 FECAL BLOOD SCRN IMMUNOASSAY: HCPCS

## 2020-10-19 PROCEDURE — 85014 HEMATOCRIT: CPT

## 2020-10-19 PROCEDURE — 6370000000 HC RX 637 (ALT 250 FOR IP): Performed by: INTERNAL MEDICINE

## 2020-10-19 PROCEDURE — 2700000000 HC OXYGEN THERAPY PER DAY

## 2020-10-19 PROCEDURE — 94660 CPAP INITIATION&MGMT: CPT

## 2020-10-19 PROCEDURE — 99232 SBSQ HOSP IP/OBS MODERATE 35: CPT | Performed by: PSYCHIATRY & NEUROLOGY

## 2020-10-19 PROCEDURE — 85018 HEMOGLOBIN: CPT

## 2020-10-19 PROCEDURE — 99232 SBSQ HOSP IP/OBS MODERATE 35: CPT | Performed by: INTERNAL MEDICINE

## 2020-10-19 PROCEDURE — 76705 ECHO EXAM OF ABDOMEN: CPT

## 2020-10-19 PROCEDURE — 94761 N-INVAS EAR/PLS OXIMETRY MLT: CPT

## 2020-10-19 PROCEDURE — 82105 ALPHA-FETOPROTEIN SERUM: CPT

## 2020-10-19 PROCEDURE — 74183 MRI ABD W/O CNTR FLWD CNTR: CPT

## 2020-10-19 PROCEDURE — 6360000004 HC RX CONTRAST MEDICATION: Performed by: INTERNAL MEDICINE

## 2020-10-19 PROCEDURE — 94640 AIRWAY INHALATION TREATMENT: CPT

## 2020-10-19 PROCEDURE — A9579 GAD-BASE MR CONTRAST NOS,1ML: HCPCS | Performed by: INTERNAL MEDICINE

## 2020-10-19 PROCEDURE — 86301 IMMUNOASSAY TUMOR CA 19-9: CPT

## 2020-10-19 PROCEDURE — 36415 COLL VENOUS BLD VENIPUNCTURE: CPT

## 2020-10-19 PROCEDURE — 76937 US GUIDE VASCULAR ACCESS: CPT

## 2020-10-19 PROCEDURE — 99233 SBSQ HOSP IP/OBS HIGH 50: CPT | Performed by: INTERNAL MEDICINE

## 2020-10-19 PROCEDURE — 6360000002 HC RX W HCPCS: Performed by: INTERNAL MEDICINE

## 2020-10-19 PROCEDURE — 2060000000 HC ICU INTERMEDIATE R&B

## 2020-10-19 PROCEDURE — 2580000003 HC RX 258: Performed by: NURSE PRACTITIONER

## 2020-10-19 RX ORDER — 0.9 % SODIUM CHLORIDE 0.9 %
30 INTRAVENOUS SOLUTION INTRAVENOUS ONCE
Status: DISCONTINUED | OUTPATIENT
Start: 2020-10-19 | End: 2020-10-26 | Stop reason: HOSPADM

## 2020-10-19 RX ORDER — POLYETHYLENE GLYCOL 3350 17 G
2 POWDER IN PACKET (EA) ORAL PRN
Status: DISCONTINUED | OUTPATIENT
Start: 2020-10-19 | End: 2020-10-26 | Stop reason: HOSPADM

## 2020-10-19 RX ORDER — SODIUM CHLORIDE 9 MG/ML
INJECTION, SOLUTION INTRAVENOUS CONTINUOUS
Status: DISCONTINUED | OUTPATIENT
Start: 2020-10-19 | End: 2020-10-26 | Stop reason: HOSPADM

## 2020-10-19 RX ORDER — DULOXETIN HYDROCHLORIDE 20 MG/1
40 CAPSULE, DELAYED RELEASE ORAL DAILY
Status: DISCONTINUED | OUTPATIENT
Start: 2020-10-20 | End: 2020-10-20

## 2020-10-19 RX ORDER — SODIUM CHLORIDE 0.9 % (FLUSH) 0.9 %
10 SYRINGE (ML) INJECTION PRN
Status: DISCONTINUED | OUTPATIENT
Start: 2020-10-19 | End: 2020-10-26 | Stop reason: HOSPADM

## 2020-10-19 RX ADMIN — THERA TABS 1 TABLET: TAB at 10:25

## 2020-10-19 RX ADMIN — IPRATROPIUM BROMIDE AND ALBUTEROL SULFATE 1 AMPULE: .5; 3 SOLUTION RESPIRATORY (INHALATION) at 14:10

## 2020-10-19 RX ADMIN — PROPRANOLOL HYDROCHLORIDE 40 MG: 40 TABLET ORAL at 21:43

## 2020-10-19 RX ADMIN — GABAPENTIN 200 MG: 100 CAPSULE ORAL at 20:20

## 2020-10-19 RX ADMIN — CARBAMAZEPINE 200 MG: 100 TABLET, EXTENDED RELEASE ORAL at 14:59

## 2020-10-19 RX ADMIN — Medication 100 MG: at 10:25

## 2020-10-19 RX ADMIN — CHLORDIAZEPOXIDE HYDROCHLORIDE 10 MG: 5 CAPSULE ORAL at 20:34

## 2020-10-19 RX ADMIN — HYDROXYZINE HYDROCHLORIDE 50 MG: 25 TABLET, FILM COATED ORAL at 10:25

## 2020-10-19 RX ADMIN — CHLORDIAZEPOXIDE HYDROCHLORIDE 10 MG: 5 CAPSULE ORAL at 14:59

## 2020-10-19 RX ADMIN — Medication 54 MG: at 00:00

## 2020-10-19 RX ADMIN — Medication 54 MG: at 14:59

## 2020-10-19 RX ADMIN — BUSPIRONE HYDROCHLORIDE 15 MG: 15 TABLET ORAL at 10:26

## 2020-10-19 RX ADMIN — Medication 54 MG: at 10:26

## 2020-10-19 RX ADMIN — BUSPIRONE HYDROCHLORIDE 15 MG: 15 TABLET ORAL at 02:37

## 2020-10-19 RX ADMIN — ROPINIROLE HYDROCHLORIDE 1 MG: 1 TABLET, FILM COATED ORAL at 20:20

## 2020-10-19 RX ADMIN — IPRATROPIUM BROMIDE AND ALBUTEROL SULFATE 1 AMPULE: .5; 3 SOLUTION RESPIRATORY (INHALATION) at 19:53

## 2020-10-19 RX ADMIN — BUSPIRONE HYDROCHLORIDE 15 MG: 15 TABLET ORAL at 20:20

## 2020-10-19 RX ADMIN — LORAZEPAM 1 MG: 2 INJECTION INTRAMUSCULAR; INTRAVENOUS at 21:43

## 2020-10-19 RX ADMIN — SODIUM CHLORIDE: 9 INJECTION, SOLUTION INTRAVENOUS at 02:39

## 2020-10-19 RX ADMIN — FAMOTIDINE 20 MG: 20 TABLET ORAL at 20:20

## 2020-10-19 RX ADMIN — GADOTERIDOL 11 ML: 279.3 INJECTION, SOLUTION INTRAVENOUS at 18:04

## 2020-10-19 RX ADMIN — PROPRANOLOL HYDROCHLORIDE 40 MG: 40 TABLET ORAL at 10:24

## 2020-10-19 RX ADMIN — AMLODIPINE BESYLATE 5 MG: 10 TABLET ORAL at 10:25

## 2020-10-19 RX ADMIN — POTASSIUM CHLORIDE 40 MEQ: 1500 TABLET, EXTENDED RELEASE ORAL at 10:25

## 2020-10-19 RX ADMIN — FERROUS SULFATE TAB EC 325 MG (65 MG FE EQUIVALENT) 325 MG: 325 (65 FE) TABLET DELAYED RESPONSE at 20:20

## 2020-10-19 RX ADMIN — CHLORDIAZEPOXIDE HYDROCHLORIDE 10 MG: 5 CAPSULE ORAL at 10:24

## 2020-10-19 RX ADMIN — GABAPENTIN 200 MG: 100 CAPSULE ORAL at 10:26

## 2020-10-19 RX ADMIN — FERROUS SULFATE TAB EC 325 MG (65 MG FE EQUIVALENT) 325 MG: 325 (65 FE) TABLET DELAYED RESPONSE at 10:26

## 2020-10-19 RX ADMIN — BUSPIRONE HYDROCHLORIDE 15 MG: 15 TABLET ORAL at 14:59

## 2020-10-19 RX ADMIN — AMOXICILLIN AND CLAVULANATE POTASSIUM 1 TABLET: 500; 125 TABLET, FILM COATED ORAL at 10:25

## 2020-10-19 RX ADMIN — CARBAMAZEPINE 200 MG: 100 TABLET, EXTENDED RELEASE ORAL at 10:26

## 2020-10-19 RX ADMIN — TRAZODONE HYDROCHLORIDE 100 MG: 100 TABLET ORAL at 20:20

## 2020-10-19 RX ADMIN — PREDNISONE 40 MG: 20 TABLET ORAL at 10:25

## 2020-10-19 RX ADMIN — FAMOTIDINE 20 MG: 20 TABLET ORAL at 10:25

## 2020-10-19 RX ADMIN — ESCITALOPRAM OXALATE 10 MG: 10 TABLET ORAL at 10:25

## 2020-10-19 RX ADMIN — HYDROXYZINE HYDROCHLORIDE 50 MG: 25 TABLET, FILM COATED ORAL at 14:59

## 2020-10-19 RX ADMIN — DULOXETINE 20 MG: 20 CAPSULE, DELAYED RELEASE ORAL at 10:24

## 2020-10-19 RX ADMIN — HYDROXYZINE HYDROCHLORIDE 50 MG: 25 TABLET, FILM COATED ORAL at 20:20

## 2020-10-19 RX ADMIN — FOLIC ACID 1 MG: 1 TABLET ORAL at 10:25

## 2020-10-19 RX ADMIN — GABAPENTIN 200 MG: 100 CAPSULE ORAL at 14:59

## 2020-10-19 RX ADMIN — AMOXICILLIN AND CLAVULANATE POTASSIUM 1 TABLET: 500; 125 TABLET, FILM COATED ORAL at 02:37

## 2020-10-19 RX ADMIN — CARBAMAZEPINE 200 MG: 100 TABLET, EXTENDED RELEASE ORAL at 20:20

## 2020-10-19 RX ADMIN — AMOXICILLIN AND CLAVULANATE POTASSIUM 1 TABLET: 500; 125 TABLET, FILM COATED ORAL at 18:39

## 2020-10-19 RX ADMIN — OXYCODONE HYDROCHLORIDE AND ACETAMINOPHEN 1 TABLET: 5; 325 TABLET ORAL at 21:43

## 2020-10-19 ASSESSMENT — PAIN DESCRIPTION - LOCATION: LOCATION: FOOT

## 2020-10-19 ASSESSMENT — PAIN - FUNCTIONAL ASSESSMENT: PAIN_FUNCTIONAL_ASSESSMENT: PREVENTS OR INTERFERES SOME ACTIVE ACTIVITIES AND ADLS

## 2020-10-19 ASSESSMENT — PAIN DESCRIPTION - ONSET: ONSET: ON-GOING

## 2020-10-19 ASSESSMENT — PAIN DESCRIPTION - DESCRIPTORS: DESCRIPTORS: ACHING;CONSTANT;SHARP;SORE

## 2020-10-19 ASSESSMENT — PAIN SCALES - GENERAL
PAINLEVEL_OUTOF10: 9
PAINLEVEL_OUTOF10: 9

## 2020-10-19 ASSESSMENT — PAIN DESCRIPTION - FREQUENCY: FREQUENCY: INTERMITTENT

## 2020-10-19 ASSESSMENT — PAIN DESCRIPTION - ORIENTATION: ORIENTATION: RIGHT;LEFT

## 2020-10-19 ASSESSMENT — PAIN DESCRIPTION - PROGRESSION: CLINICAL_PROGRESSION: NOT CHANGED

## 2020-10-19 ASSESSMENT — PAIN DESCRIPTION - PAIN TYPE: TYPE: CHRONIC PAIN
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
## 2020-10-19 NOTE — PLAN OF CARE
Nutrition Problem #1: Inadequate oral intake  Intervention: Food and/or Nutrient Delivery: Continue NPO(Start diet as able; recommend ensure enlive supplements TID as able)  Nutritional Goals: Restart diet within 24-72 hrs

## 2020-10-19 NOTE — PLAN OF CARE
BRONCHOSPASM/BRONCHOCONSTRICTION     [x]         IMPROVE AERATION/BREATH SOUNDS  [x]   ADMINISTER BRONCHODILATOR THERAPY AS APPROPRIATE  [x]   ASSESS BREATH SOUNDS  []   IMPLEMENT AEROSOL/MDI PROTOCOL  [x]   PATIENT EDUCATION AS NEEDED     NON INVASIVE VENTILATION  PROVIDE OPTIMAL VENTILATION/ACCEPTABLE SP02  IMPLEMENT NON INVASIVE VENTILATION PROTOCOL  ASSESSMENT SKIN INTEGRITY  PATIENT EDUCATION AS NEEDED  BIPAP AS NEEDED

## 2020-10-19 NOTE — PROGRESS NOTES
[facility]  Office: [address], DO, Pablo Hogan, DO, Kristin Wick, DO, Josiane Gillette Blood, DO, Daniel Francois MD, Lorri Reaves MD, Reuben Sanchez MD, Yvonne Anne MD, Trae Nixon MD, Kavon Vieyra MD, Gabby Gomes MD, Tim Rivera MD, Mary Pham MD, Yasir Miller, DO, Leilani Aragon MD, Nimisha Leal MD, Gabriella Rossi DO, Reina Michelle MD,  Chris Rodriguez, DO, Lis Stafford MD, Ayden Joseph MD, Estrella Dasilva, [facility], [facility], CNP, Joseph Cerna, CNP, Josr Diaz, CNS, Noah Barthel, CNP, Diego Harmon, CNP, Flynn Young, CNP, Adriana Echeverria, CNP, Barbara Merritt, CNP, Ce Meneses PA-C, Will [facility], [facility], Tono Keyes, CNP, Shante Parkinson, CNP, Lesley Salinas, CNP, Andrea Ceron, CNP, Chucky Rodriguez, [address] [place] [address]    Progress Note    10/19/2020    2:06 PM    Name:   Courtney Tsai  MRN:     [id number]     [place]:      [de-identified]   Room:   2003/2003-01   Day:  7  Admit Date:  10/12/2020  7:48 PM    PCP:   Jayla Castanon MD  Code Status:  Full Code    Subjective:     C/C:   Chief Complaint   Patient presents with    Shortness of Breath     Interval History Status: improved. Pt is resting in bed on oxygen. She was on Bipap overnight. She is still slightly SOB and wheezing. She is anxious about the lesion on her pancreas. She is tearful and \"tired of being sick. \"    Brief History:     Per my partner:  \"45year-old brought in by family after being noted to be hypoxic at her PCPs office. Patient does complain of productive cough, bilateral lower extremity painx 2-3 months but worse in the last month.  Chronic unchanged shortness of breath going on for more than a year.  Has been using albuterol inhaler as needed.  Continues to smoke about 1 pack/day.  Drinks alcohol every day: Rum. Known history of hypertension, depression, COPD, excision of Astrocytoma.   - ED evaluation showed patient was afebrile, hypoxic to 84% on room air, intoxicated with alcohol level 244, anemic hemoglobin 9.4, COVID rapid NAAT:  Negative, chest x-ray was unremarkable no pneumonia, high sensitive troponin 9, EKG with nonspecific ST-T changes. Lower extremity venous Dopplers negative for DVT. \"    Review of Systems:     Constitutional:  negative for chills, fevers, sweats  Respiratory:  Positive for cough, dyspnea on exertion, shortness of breath, wheezing  Cardiovascular:  negative for chest pain, chest pressure/discomfort, lower extremity edema, palpitations  Gastrointestinal:  negative for abdominal pain, constipation, diarrhea, nausea, vomiting  Neurological:  negative for dizziness, headache    Medications:      Allergies:  No Known Allergies    Current Meds:   Scheduled Meds:    [START ON 10/20/2020] DULoxetine  40 mg Oral Daily    potassium chloride  40 mEq Oral Daily    amoxicillin-clavulanate  1 tablet Oral 3 times per day    predniSONE  40 mg Oral Daily    chlordiazePOXIDE  10 mg Oral TID    ipratropium-albuterol  1 ampule Inhalation Q4H WA    amLODIPine  5 mg Oral Daily    busPIRone  15 mg Oral Q6H    carBAMazepine  200 mg Oral TID    ferrous sulfate  325 mg Oral BID    folic acid  1 mg Oral Daily    gabapentin  200 mg Oral TID    hydrOXYzine  50 mg Oral TID    magnesium carbonate  54 mg Oral TID    propranolol  40 mg Oral BID    rOPINIRole  1 mg Oral Nightly    traZODone  100 mg Oral Nightly    vitamin B-1  100 mg Oral Daily    sodium chloride flush  10 mL Intravenous 2 times per day    famotidine  20 mg Oral BID    [Held by provider] enoxaparin  40 mg Subcutaneous Daily    multivitamin  1 tablet Oral Daily    budesonide-formoterol  2 puff Inhalation BID     Continuous Infusions:    sodium chloride 100 mL/hr at 10/19/20 0239     PRN Meds: nicotine polacrilex, nicotine polacrilex, oxyCODONE-acetaminophen, LORazepam, sodium chloride flush, acetaminophen **OR** [DISCONTINUED] acetaminophen, polyethylene glycol, promethazine **OR** ondansetron, nicotine, albuterol    Data:     Past Medical History:   has a past medical history of Closed fracture of lateral portion of left tibial plateau, COPD (chronic obstructive pulmonary disease) ([address] Depression, Hypertension, and Pain, joint, ankle and foot. Social History:   reports that she has been smoking cigarettes. She has a 30.00 pack-year smoking history. She has never used smokeless tobacco. She reports current alcohol use. She reports current drug use. Drug: Marijuana. Family History:   Family History   Problem Relation Age of Onset    Other Father     Cancer Maternal Grandmother     Heart Disease Paternal Grandmother        Vitals:  /82   Pulse 73   Temp 98.8 °F (37.1 °C) (Oral)   Resp 22   Ht 5' 5\" (1.651 m)   Wt 127 lb 4.8 oz (57.7 kg)   SpO2 97%   BMI 21.18 kg/m²   Temp (24hrs), Av.5 °F (36.9 °C), Min:97.7 °F (36.5 °C), Max:98.9 °F (37.2 °C)    Recent Labs     10/16/20  1625   POCGLU 133*       I/O (24Hr):     Intake/Output Summary (Last 24 hours) at 10/19/2020 1406  Last data filed at 10/18/2020 2031  Gross per 24 hour   Intake 20 ml   Output --   Net 20 ml       Labs:  Hematology:  Recent Labs     10/17/20  0546 10/18/20  0716 10/19/20  0102 10/19/20  0729 10/19/20  1223   WBC 11.0 10.2  --  9.2  --    RBC 2.70* 2.53*  --  2.53*  --    HGB 8.7* 8.1* 8.4* 8.4*  8.3* 8.3*   HCT 29.3* 27.2* 27.1* 27.3*  27.8* 27.0*   .5* 107.5*  --  109.9*  --    MCH 32.2 32.0  --  32.8  --    MCHC 29.7 29.8  --  29.9  --    RDW 16.5* 16.8*  --  17.0*  --     313  --  344  --    MPV 11.9 11.8  --  11.1  --      Chemistry:  Recent Labs     10/18/20  0716 10/19/20  0121 10/19/20  0729    140 141   K 2.9* 3.3* 3.0*    105 106   CO2 24 25 24   GLUCOSE 92 132* 100*   BUN 11 9 9   CREATININE 0.32* 0.22* <0.20*   MG 1.3* 1.5* 1.3*   ANIONGAP 15 10 11   LABGLOM >60 >60 CANNOT BE CALCULATED   GFRAA >60 >60 CANNOT BE CALCULATED   CALCIUM 8.0* 7.8* 7.9*     Recent Labs     10/16/20  1625 10/17/20  0546 10/18/20  0716 10/19/20  0729   PROT  --  5.3* 5.1* 5.1*   LABALBU  --  1.9* 1.9* 2.0*   AST  --  60* 38* 35*   ALT  --  17 13 13   ALKPHOS  --  258* 214* 215*   BILITOT  --  0.80 0.61 0.51   POCGLU 133*  --   --   --      ABG:  Lab Results   Component Value Date    POCPH 7.452 10/16/2020    POCPCO2 37.0 10/16/2020    POCPO2 80.7 10/16/2020    POCHCO3 25.9 10/16/2020    NBEA NOT REPORTED 10/16/2020    PBEA 2 10/16/2020    HJL6FSI 27 10/16/2020    DQCK4CKC 96 10/16/2020    FIO2 55.0 10/16/2020     Lab Results   Component Value Date/Time    SPECIAL NOT REPORTED 10/13/2020 08:27 AM     Lab Results   Component Value Date/Time    CULTURE NORMAL RESPIRATORY SUSIE MODERATE GROWTH 10/13/2020 08:27 AM       Radiology:  Us Liver    Result Date: 10/19/2020  *Fatty infiltration of the liver. *Dilatation of the CBD measuring 11.3 mm. Gallbladder appears unremarkable. In addition, there appears to be a cystic lesion involving the head of the pancreas measuring 2.4 x 2.2 x 1.5 cm. Further evaluation with MRI/MRCP with and without IV contrast is recommended. Differential considerations includes small pseudocyst, duodenal diverticulum or possibly pancreatic cystic neoplasm. Findings were not present on the 07/15/2019 study. Xr Chest Portable    Result Date: 10/17/2020  Mild interval improvement in scattered pulmonary opacities. Decrease in size of pleural effusions, now minimal.     Xr Chest Portable    Result Date: 10/16/2020  Small bilateral pleural effusions. Worsening right lower lobe infiltrate and possibly new left lower lobe infiltrate. Xr Chest Portable    Result Date: 10/14/2020  Small right pleural effusion. Linear opacity in the right lung base, atelectasis versus pneumonia. Xr Chest Portable    Result Date: 10/13/2020  Clear chest, stable when compared to previous.      Xr Chest Portable    Result Date: 10/12/2020  Negative chest.       Physical Examination:        General appearance:  alert, cooperative and no distress  Mental Status:  oriented to person, place and time and sad affect  Lungs:  Coarse BS bilaterally with wheezes throughout, normal effort  Heart:  regular rate and rhythm, no murmur  Abdomen:  soft, nontender, nondistended, normal bowel sounds, no masses, hepatomegaly, splenomegaly  Extremities:  no edema, redness, tenderness in the calves  Skin:  no gross lesions, rashes, induration    Assessment:        Hospital Problems           Last Modified POA    * (Principal) COPD exacerbation ([address] 10/13/2020 Yes    Essential hypertension 10/13/2020 Yes    Depression 10/13/2020 Yes    Alcohol withdrawal syndrome without complication ([address] 95/63/4392 Yes    Paresthesia of lower extremity 10/13/2020 Yes    Acute respiratory failure with hypoxia ([address] 10/16/2020 Yes    Pancreatic cyst 10/19/2020 Yes          Plan:        Acute COPD exacerbation- Bipap prn, oxygen, Pulmonary following, on Augmentin, Prednisone  Acute resp failure- Bipap  Etoh abuse- experiencing withdrawal, on Ciwa protocol, Librium  Depression- Psych consult   Pancreatic cyst- dw GI, needs MRI/ MRCP, CA 19-9 slightly elevated, AFP wnl  Tobacco abuse- refusing Nicotine patch, will try lozenges/ gum  Hypokalemia/ Hypomag- replace, schedule KCL, will start MagOx  Gi/ DVT proph    Nadia Chery MD  10/19/2020  2:06 PM

## 2020-10-19 NOTE — PROGRESS NOTES
Comprehensive Nutrition Assessment    Type and Reason for Visit:  Initial(LOS day 7)    Nutrition Recommendations/Plan:   -Continue NPO status   -Restart diet as able   -Recommend ensure enlive supplements BID as able   -Will monitor nutrition progression    Nutrition Assessment:   Pt admitted d/t SOB. Pt w/ hx of EtoH abuse. Pt currently NPO d/t GI bleed. Prior to NPO status pt was consuming % of her meals. Pt w/ hx of variable weights from 126-144 lbs over 1 yr. Will monitor for start of diet and add nutritional supplements as able to compliment po intake/increased nutrient needs. Will monitor. Malnutrition Assessment:  Malnutrition Status:  Insufficient data    Context:  Social/Environmental Circumstances     Findings of the 6 clinical characteristics of malnutrition:  Energy Intake:  No significant decrease in energy intake  Weight Loss:  No significant weight loss     Body Fat Loss:  Unable to assess     Muscle Mass Loss:  Unable to assess    Fluid Accumulation:  No significant fluid accumulation     Strength:  Not Performed    Estimated Daily Nutrient Needs:  Energy (kcal):  30-35 ~> 5690-8615 kcals/d; Weight Used for Energy Requirements:  Admission     Protein (g):  1.3-1.5 gm/kg ~>74-86 gms/d; Weight Used for Protein Requirements:  Admission        Nutrition Related Findings:  K 3.0      Wounds:  None       Current Nutrition Therapies:    Diet NPO Effective Now    Anthropometric Measures:  · Height: 5' 5\" (165.1 cm)  · Current Body Weight: 127 lb (57.6 kg)   · Admission Body Weight: 126 lb (57.2 kg)   · Ideal Body Weight: 125 lbs; % Ideal Body Weight 101.6 %   · BMI: 21.1  · BMI Categories: Normal Weight (BMI 18.5-24. 9)       Nutrition Diagnosis:   · Inadequate oral intake related to altered GI function(EtoH abuse) as evidenced by NPO or clear liquid status due to medical condition      Nutrition Interventions:   Food and/or Nutrient Delivery:  Continue NPO(Start diet as able; recommend ensure enlive supplements TID as able)  Nutrition Education/Counseling:  Education not indicated   Coordination of Nutrition Care:  Continued Inpatient Monitoring    Goals Set   Restart diet within 24-72 hrs       Nutrition Monitoring and Evaluation:   Food/Nutrient Intake Outcomes:  Diet Advancement/Tolerance  Physical Signs/Symptoms Outcomes:  Biochemical Data, Nutrition Focused Physical Findings, Skin, Weight, GI Status     Discharge Planning:     Too soon to determine     Electronically signed by Mary Santiago RD, LD on 10/19/20 at 1:04 PM EDT    Contact: [phone]

## 2020-10-19 NOTE — CARE COORDINATION
Received call from [address]  states pts mother is requesting to speak to SW. Writer placed call to pts mother Eduardo Tate [phone], inquired about Social Security disability and ph# given to contact. Mother also inquired about issues with Medicaid and writer provided HELP ph# to contact also. Mother aware that pt has declined both SNF and alcohol rehab. Writer also informed mother pt does not meet age criteria for APS. Mother  inquired about POA paperwork and writer explained process. Mother was appreciative of information provided.

## 2020-10-19 NOTE — CONSULTS
Department of Psychiatry  Behavioral Health Consult progress note    REASON FOR CONSULT: Worsening depression, PTSD, alcohol abuse    CONSULTING PHYSICIAN: Dr. Karla Galvan    History obtained from: patient, chart and patient's mother    Interim history. -    The patient continues to feel depressed. She reports no changes in her mental state. She has tolerated the reduction in Lexapro without any withdrawal symptoms. She has no problems with the current dose of duloxetine. We discussed that we would make the cross taper completed today and discontinue Lexapro. We also discussed that maintaining sobriety would be the key to improving depression because these medications are unlikely to work if she is drinking heavily. The patient denies any suicidal ideation. She denies any auditory or visual hallucinations. She does not appear to have any delusional phenomena. Collateral from mom- Patient's mother wanted to speak to me. They want patient to be under guardianship. She described a pattern of poor self care. Patient has dog poop at home from not letting the dog out. She has diarrhea and the house is full of trash bags. She doesn't follow up with medical appointments and continues to drink heavily. I have discussed with them that they should speak to patient's primary physician. At this time, she is not in need of guardianship from a psych standpoint. She is willing to have treatment for her depression.      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    FRACTURE SURGERY Left 7-3-13    ORIF tibial plateau    FRACTURE SURGERY Right     small finger metacarpal fracture    HYSTERECTOMY  2003         Medications Prior to Admission:   Medications Prior to Admission: tiotropium (SPIRIVA RESPIMAT) 1.25 MCG/ACT AERS inhaler, Inhale 2 puffs into the lungs daily  tiZANidine (ZANAFLEX) 4 MG tablet, Take 1 tablet by mouth every 8 hours as needed (back pain)  potassium chloride (KLOR-CON M) 20 MEQ extended release tablet, Take 1 tablet by mouth daily  magnesium carbonate (MAGONATE) 54 (Mag Equiv) MG/5ML LIQD oral liquid, Take 5 mLs by mouth 3 times daily  ferrous sulfate (IRON 325) 325 (65 Fe) MG tablet, Take 1 tablet by mouth 2 times daily  rOPINIRole (REQUIP) 1 MG tablet, Take 1 tablet by mouth nightly  ACETAMINOPHEN EXTRA STRENGTH 500 MG tablet, Take 500 mg by mouth 3 times daily as needed  ibuprofen (ADVIL;MOTRIN) 800 MG tablet, Take 800 mg by mouth 3 times daily as needed  propranolol (INDERAL) 40 MG tablet, Take 40 mg by mouth 2 times daily  albuterol sulfate HFA (VENTOLIN HFA) 108 (90 Base) MCG/ACT inhaler, Inhale 2 puffs into the lungs 4 times daily as needed for Wheezing  busPIRone (BUSPAR) 15 MG tablet, Take 15 mg by mouth every 6 hours  escitalopram (LEXAPRO) 20 MG tablet, Take 1.5 tablets by mouth daily  traZODone (DESYREL) 100 MG tablet, Take 1 tablet by mouth nightly  carBAMazepine (TEGRETOL XR) 200 MG extended release tablet, Take 1 tablet by mouth 3 times daily  amLODIPine (NORVASC) 5 MG tablet, Take 1 tablet by mouth daily  gabapentin (NEURONTIN) 100 MG capsule, Take 2 capsules by mouth 3 times daily for 180 days.  Intended supply: 90 days  hydrOXYzine (ATARAX) 50 MG tablet, Take 1 tablet by mouth 3 times daily  acamprosate (CAMPRAL) 333 MG tablet, Take 2 tablets by mouth 3 times daily  vitamin B-1 (THIAMINE) 100 MG tablet, Take 1 tablet by mouth daily  vitamin D (ERGOCALCIFEROL) 48723 units CAPS capsule, Take 1 capsule by mouth once a week  folic acid (FOLVITE) 1 MG tablet, Take 1 tablet by mouth daily    Allergies:  Patient has no known allergies. FAMILY/SOCIAL HISTORY:  Family History   Problem Relation Age of Onset    Other Father     Cancer Maternal Grandmother     Heart Disease Paternal Grandmother      Social History     Socioeconomic History    Marital status: Single     Spouse name: Not on file    Number of children: Not on file    Years of education: Not on file    Highest education level: Not on file   Occupational History    Not on file   Social Needs    Financial resource strain: Not on file    Food insecurity     Worry: Not on file     Inability: Not on file   [employer] needs     Medical: Not on file     Non-medical: Not on file   Tobacco Use    Smoking status: Current Every Day Smoker     Packs/day: 1.00     Years: 30.00     Pack years: 30.00     Types: Cigarettes    Smokeless tobacco: Never Used   Substance and Sexual Activity    Alcohol use:  Yes     Alcohol/week: 0.0 standard drinks    Drug use: Yes     Types: Marijuana     Comment: occasional    Sexual activity: Not on file   Lifestyle    Physical activity     Days per week: Not on file     Minutes per session: Not on file    Stress: Not on file   Relationships    Social connections     Talks on phone: Not on file     Gets together: Not on file     Attends Buddhist service: Not on file     Active member of club or organization: Not on file     Attends meetings of clubs or organizations: Not on file     Relationship status: Not on file    Intimate partner violence     Fear of current or ex partner: Not on file     Emotionally abused: Not on file     Physically abused: Not on file     Forced sexual activity: Not on file   Other Topics Concern    Not on file   Social History Narrative    Not on file       REVIEW OF SYSTEMS    Constitutional: [] fever  [] chills  [] weight loss  []weakness [] Other:  Eyes:  [] photophobia  [] discharge [] acuity change   [] Diplopia   [] Other:  HENT:  [] sore throat  [] ear pain [] Tinnitus   [] Other  Respiratory:  [x] 25 24   BUN 11 9 9   CREATININE 0.32* 0.22* <0.20*   GLUCOSE 92 132* 100*     Recent Labs     10/17/20  0546 10/18/20  0716 10/19/20  0729   BILITOT 0.80 0.61 0.51   ALKPHOS 258* 214* 215*   AST 60* 38* 35*   ALT 17 13 13     Lab Results   Component Value Date    BARBSCNU NEGATIVE 07/14/2019    LABBENZ POSITIVE 07/14/2019    LABMETH NEGATIVE 07/14/2019    PPXUR NOT REPORTED 07/14/2019     Lab Results   Component Value Date    TSH 0.69 07/10/2020     No results found for: LITHIUM  No results found for: VALPROATE, CBMZ  No results found for: LITHIUM, VALPROATE    FURTHER LABS ORDERED :      Radiology   Xr Chest Portable    Result Date: 10/17/2020  EXAMINATION: ONE XRAY VIEW OF THE CHEST 10/17/2020 9:42 am COMPARISON: 16 October 2020 HISTORY: ORDERING SYSTEM PROVIDED HISTORY: f/u TECHNOLOGIST PROVIDED HISTORY: f/u FINDINGS: AP portable view of the chest time stamped at 913 hours demonstrates overlying cardiac monitoring electrodes. The patient has bilateral effusions, diminished from prior study. Mild interval improvement in scattered pulmonary opacities is noted. No extrapleural air. Heart size is normal.     Mild interval improvement in scattered pulmonary opacities. Decrease in size of pleural effusions, now minimal.     Xr Chest Portable    Result Date: 10/16/2020  EXAMINATION: ONE XRAY VIEW OF THE CHEST 10/16/2020 11:52 am COMPARISON: October 14, 2020 HISTORY: ORDERING SYSTEM PROVIDED HISTORY: SOB TECHNOLOGIST PROVIDED HISTORY: SOB Reason for Exam: sob port upr at 1110am FINDINGS: There is no evidence of pneumothorax. Is small bilateral pleural effusions right greater than left. There is worsening right lower lobe infiltrate. There is also developing opacity in the left lower lobe which may represent new or infiltrate. Trachea is midline. Heart mediastinum are stable. Visualized bony thorax shows no acute abnormality. Small bilateral pleural effusions.   Worsening right lower lobe infiltrate and possibly new left lower lobe infiltrate. Xr Chest Portable    Result Date: 10/14/2020  EXAMINATION: ONE XRAY VIEW OF THE CHEST 10/14/2020 9:53 pm COMPARISON: 10/13/2020 HISTORY: ORDERING SYSTEM PROVIDED HISTORY: hypoxia TECHNOLOGIST PROVIDED HISTORY: hypoxia Reason for Exam: hypoxia    upright port FINDINGS: Cardiomediastinal silhouette is unchanged in size. Small right pleural effusion. Linear opacity in the right lung base. No pneumothorax. No acute osseous abnormality. Small right pleural effusion. Linear opacity in the right lung base, atelectasis versus pneumonia. Xr Chest Portable    Result Date: 10/13/2020  EXAMINATION: ONE XRAY VIEW OF THE CHEST 10/13/2020 8:09 am COMPARISON: October 12, 2020 HISTORY: ORDERING SYSTEM PROVIDED HISTORY: AECOPD TECHNOLOGIST PROVIDED HISTORY: AECOPD Reason for Exam: AECOPD/  AP erect/ port. Acuity: Unknown Type of Exam: Unknown FINDINGS: Is no evidence of focal infiltrate, effusion, or pneumothorax. There are chronic changes so she was COPD unchanged from previous. The heart mediastinum appear normal.  Trachea is midline. Visualized bony thorax shows no acute abnormality. Clear chest, stable when compared to previous. Xr Chest Portable    Result Date: 10/12/2020  EXAMINATION: ONE XRAY VIEW OF THE CHEST 10/12/2020 8:33 pm COMPARISON: 06/02/2020 HISTORY: ORDERING SYSTEM PROVIDED HISTORY: SIRD(+) Reason for Exam: shortness of breath    upright port FINDINGS: The lungs are without acute focal process. No effusion or pneumothorax. The cardiomediastinal silhouette is normal.  The osseous structures are intact without acute process.      Negative chest.     Vl Dup Lower Extremity Venous Bilateral    Result Date: 10/13/2020    [facility]  Vascular Lower Extremities DVT Study Procedure   Patient Name   Judith Ward     Date of Study           10/13/2020                 PAM WYNN   Date of Birth  1970  Gender                  Female   Age 45 year(s)  Race                       Room Number    2003   Corporate ID # [id number]   Patient Acct # [de-identified]   MR #           [id number]     Kandy Mcburney, T   Accession #    [id number]  Interpreting Physician  Ange Ye   Referring                  Referring Physician     Jesse Araujo  Nurse  Practitioner  Procedure Type of Study:   Veins: Lower Extremities DVT Study, Venous Scan Lower Bilateral.  Indications for Study:Pain, leg and Hypoxia. Patient Status:ER. Technical Quality:Limited visualization. Limitation reason:Limited visualization of the calf veins due to small size in diameter. Conclusions   Summary   Simultaneous real time imaging utilizing B-Mode, color doppler and  spectral waveform analysis was performed on the bilateral lower  extremities for venous examination of the deep and superficial systems. Findings are:   Right:  No evidence of deep or superficial venous thrombosis. Left:  No evidence of deep or superficial venous thrombosis. Signature   ----------------------------------------------------------------  Electronically signed by Gale Helm RVT(Sonographer) on  10/13/2020 12:47 PM  ----------------------------------------------------------------   ----------------------------------------------------------------  Electronically signed by Jessi Foster(Interpreting  physician) on 10/13/2020 10:13 PM  ----------------------------------------------------------------  Findings:   Right Impression:                    Left Impression:  The common femoral, femoral,         The common femoral, femoral,  popliteal and tibial veins           popliteal and tibial veins  demonstrate normal compressibility   demonstrate normal compressibility  and augmentation. and augmentation. Normal compressibility of the great  Normal compressibility of the great  saphenous vein. saphenous vein.    Normal compressibility of the small  Normal compressibility of the small  saphenous vein. saphenous vein. Velocities are measured in cm/s ; Diameters are measured in cm Right Lower Extremities DVT Study Measurements Right 2D Measurements +------------------------------------+----------+---------------+----------+ ! Location                            ! Visualized! Compressibility! Thrombosis! +------------------------------------+----------+---------------+----------+ ! Common Femoral                      !Yes       ! Yes            ! None      ! +------------------------------------+----------+---------------+----------+ ! Prox Femoral                        !Yes       ! Yes            ! None      ! +------------------------------------+----------+---------------+----------+ ! Mid Femoral                         !Yes       ! Yes            ! None      ! +------------------------------------+----------+---------------+----------+ ! Dist Femoral                        !Yes       ! Yes            ! None      ! +------------------------------------+----------+---------------+----------+ ! Deep Femoral                        !Yes       ! Yes            ! None      ! +------------------------------------+----------+---------------+----------+ ! Popliteal                           !Yes       ! Yes            ! None      ! +------------------------------------+----------+---------------+----------+ ! Sapheno Femoral Junction            ! Yes       ! Yes            ! None      ! +------------------------------------+----------+---------------+----------+ ! PTV                                 ! Partial   !Yes            ! None      ! +------------------------------------+----------+---------------+----------+ ! Peroneal                            !Partial   !Yes            ! None      ! +------------------------------------+----------+---------------+----------+ ! Gastroc                             ! Yes       ! Yes            ! None      ! +------------------------------------+----------+---------------+----------+ ! GSV Thigh                           ! Yes       ! Yes            ! None      ! +------------------------------------+----------+---------------+----------+ ! GSV Knee                            ! Yes       ! Yes            ! None      ! +------------------------------------+----------+---------------+----------+ ! GSV Ankle                           ! Yes       ! Yes            ! None      ! +------------------------------------+----------+---------------+----------+ ! SSV                                 ! Partial   !Yes            ! None      ! +------------------------------------+----------+---------------+----------+ Right Doppler Measurements +---------------------------+------+------+--------------------------------+ ! Location                   ! Signal!Reflux! Reflux (msec)                   ! +---------------------------+------+------+--------------------------------+ ! Common Femoral             !Phasic!      !                                ! +---------------------------+------+------+--------------------------------+ ! Prox Femoral               !Phasic!      !                                ! +---------------------------+------+------+--------------------------------+ ! Popliteal                  !Phasic!      !                                ! +---------------------------+------+------+--------------------------------+ Left Lower Extremities DVT Study Measurements Left 2D Measurements +------------------------------------+----------+---------------+----------+ ! Location                            ! Visualized! Compressibility! Thrombosis! +------------------------------------+----------+---------------+----------+ ! Common Femoral                      !Yes       ! Yes            ! None      ! +------------------------------------+----------+---------------+----------+ ! Prox Femoral                        !Yes       ! Yes            ! None      ! +------------------------------------+----------+---------------+----------+ ! Mid Femoral                         !Yes       ! Yes            ! None      ! +------------------------------------+----------+---------------+----------+ ! Dist Femoral                        !Yes       ! Yes            ! None      ! +------------------------------------+----------+---------------+----------+ ! Deep Femoral                        !Yes       ! Yes            ! None      ! +------------------------------------+----------+---------------+----------+ ! Popliteal                           !Yes       ! Yes            ! None      ! +------------------------------------+----------+---------------+----------+ ! Sapheno Femoral Junction            ! Yes       ! Yes            ! None      ! +------------------------------------+----------+---------------+----------+ ! PTV                                 ! Partial   !Yes            ! None      ! +------------------------------------+----------+---------------+----------+ ! Peroneal                            !Partial   !Yes            ! None      ! +------------------------------------+----------+---------------+----------+ ! Gastroc                             ! Yes       ! Yes            ! None      ! +------------------------------------+----------+---------------+----------+ ! GSV Thigh                           ! Yes       ! Yes            ! None      ! +------------------------------------+----------+---------------+----------+ ! GSV Knee                            ! Yes       ! Yes            ! None      ! +------------------------------------+----------+---------------+----------+ ! GSV Ankle                           ! Yes       ! Yes            ! None      ! +------------------------------------+----------+---------------+----------+ ! SSV                                 ! Partial   !Yes            ! None      ! +------------------------------------+----------+---------------+----------+ Left Doppler Measurements +---------------------------+------+------+--------------------------------+ ! Location                   ! Signal!Reflux! Reflux (msec)                   ! +---------------------------+------+------+--------------------------------+ ! Common Femoral             !Phasic!      !                                ! +---------------------------+------+------+--------------------------------+ ! Prox Femoral               !Phasic!      !                                ! +---------------------------+------+------+--------------------------------+ ! Popliteal                  !Phasic!      !                                ! +---------------------------+------+------+--------------------------------+      EKG: QTc noted to be over 700 ms and last EKG on file    DIAGNOSIS:       MDD recurrent severe  Alcohol use disorder  PTSD        RISK ASSESSMENT: low risk of suicide or harm to others        RECOMMENDATIONS    Risk Management:  close watch    Medications:  Lexapro discontinued. Cymbalta increased to 40 mg daily  Will follow    Discussed with the treating physician/ team about the patient and treatment plan  Reviewed the chart    Discussed with the patient risk, benefit, alternative and common side effects for the  proposed medication treatment. Patient is consenting to the treatment. Thanks for the consult. Please call me if needed. Electronically signed by Luis De La Rosa MD on 10/19/2020 at 12:10 PM    Please note that this chart was generated using voice recognition Dragon dictation software. Although every effort was made to ensure the accuracy of this automated transcription, some errors in transcription may have occurred.

## 2020-10-19 NOTE — TELEPHONE ENCOUNTER
That is a legal process that they will need to go through the probate court for. I can help with the medical portion of the paperwork, they should contact the [facility] court for details.

## 2020-10-19 NOTE — PLAN OF CARE
Problem: Falls - Risk of:  Goal: Will remain free from falls  Description: Will remain free from falls  Outcome: Ongoing  Goal: Absence of physical injury  Description: Absence of physical injury  Outcome: Ongoing     Problem: Pain:  Goal: Pain level will decrease  Description: Pain level will decrease  Outcome: Ongoing  Goal: Control of acute pain  Description: Control of acute pain  Outcome: Ongoing  Goal: Control of chronic pain  Description: Control of chronic pain  Outcome: Ongoing     Problem: Breathing Pattern - Ineffective:  Goal: Ability to achieve and maintain a regular respiratory rate will improve  Description: Ability to achieve and maintain a regular respiratory rate will improve  Outcome: Ongoing     Problem: Gas Exchange - Impaired:  Goal: Levels of oxygenation will improve  Description: Levels of oxygenation will improve  Outcome: Ongoing     Problem: Skin Integrity:  Goal: Will show no infection signs and symptoms  Description: Will show no infection signs and symptoms  Outcome: Ongoing  Goal: Absence of new skin breakdown  Description: Absence of new skin breakdown  Outcome: Ongoing     Problem: Nutrition  Goal: Optimal nutrition therapy  Description: Nutrition Problem #1: Inadequate oral intake  Intervention: Food and/or Nutrient Delivery: Continue NPO(Start diet as able; recommend ensure enlive supplements TID as able)  Nutritional Goals: Restart diet within 24-72 hrs     Outcome: Ongoing

## 2020-10-19 NOTE — CONSULTS
years. Ethanol 244 on admission. Patient was noted to go through alcohol withdrawal during her admission currently receiving Ativan as needed. Previous GI history:    Patient denies any prior EGD or colonoscopy.  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  Authorizing Provider   tiotropium (SPIRIVA RESPIMAT) 1.25 MCG/ACT AERS inhaler Inhale 2 puffs into the lungs daily 10/12/20   Ludivina Ellis MD   tiZANidine (ZANAFLEX) 4 MG tablet Take 1 tablet by mouth every 8 hours as needed (back pain) 10/12/20   Ludivina Ellis MD   potassium chloride (KLOR-CON M) 20 MEQ extended release tablet Take 1 tablet by mouth daily 9/10/20   [place]opolis Ee, APRN - CNP   magnesium carbonate (MAGONATE) 54 (Mag Equiv) MG/5ML LIQD oral liquid Take 5 mLs by mouth 3 times daily 7/16/20   Ludivina Ellis MD   ferrous sulfate (IRON 325) 325 (65 Fe) MG tablet Take 1 tablet by mouth 2 times daily 7/16/20   Ludivina Ellis MD   rOPINIRole (REQUIP) 1 MG tablet Take 1 tablet by mouth nightly 7/8/20   Ludivina Ellis MD   ACETAMINOPHEN EXTRA STRENGTH 500 MG tablet Take 500 mg by mouth 3 times daily as needed 5/8/20   Historical Provider, MD   ibuprofen (ADVIL;MOTRIN) 800 MG tablet Take 800 mg by mouth 3 times daily as needed 5/15/20   Historical Provider, MD   propranolol (INDERAL) 40 MG tablet Take 40 mg by mouth 2 times daily 5/12/20   Historical Provider, MD   albuterol sulfate HFA (VENTOLIN HFA) 108 (90 Base) MCG/ACT inhaler Inhale 2 puffs into the lungs 4 times daily as needed for Wheezing 6/11/20   Ludivina Gibson MD   busPIRone (BUSPAR) 15 MG tablet Take 15 mg by mouth every 6 hours 6/11/20   Ludivina Gibson MD   escitalopram (LEXAPRO) 20 MG tablet Take 1.5 tablets by mouth daily 6/11/20   Ludivina Gibson MD   traZODone (DESYREL) 100 MG tablet Take 1 tablet by mouth nightly 6/11/20   Ludivina Gibson MD   carBAMazepine (TEGRETOL XR) 200 MG extended release tablet Take 1 tablet by mouth 3 times daily 6/11/20   Ludivina Gibson MD   amLODIPine (NORVASC) 5 MG tablet Take 1 tablet by mouth daily 6/11/20   Ludivina Gibson MD   gabapentin (NEURONTIN) 100 MG capsule Take 2 capsules by mouth 3 times daily for 180 days.  Intended supply: 90 days 6/11/20 12/8/20  Ludivina Gibson MD   hydrOXYzine (ATARAX) 50 MG tablet Take 1 tablet by mouth 3 times daily 6/11/20   Ludivina Gibson MD   acamprosate (CAMPRAL) 333 MG tablet Take 2 tablets by mouth 3 times daily 12/10/19 10/12/20  LOI Montejo NP   vitamin B-1 (THIAMINE) 100 MG tablet Take 1 tablet by mouth daily 7/12/19   Ludivina Gibson MD   vitamin D (ERGOCALCIFEROL) 01952 units CAPS capsule Take 1 capsule by mouth once a week 6/19/19   Ludivina Gibson MD   folic acid (FOLVITE) 1 MG tablet Take 1 tablet by mouth daily 6/19/19   Ludivina Gibson MD       CURRENT MEDICATIONS:  Scheduled Meds:   potassium chloride  40 mEq Oral Daily    escitalopram  10 mg Oral Daily    amoxicillin-clavulanate  1 tablet Oral 3 times per day    predniSONE  40 mg Oral Daily    DULoxetine  20 mg Oral Daily    chlordiazePOXIDE  10 mg Oral TID    ipratropium-albuterol  1 ampule Inhalation Q4H WA    amLODIPine  5 mg Oral Daily    busPIRone  15 mg Oral Q6H    carBAMazepine  200 mg Oral TID    ferrous sulfate  325 mg Oral BID    folic acid  1 mg Oral Daily    gabapentin  200 mg Oral TID    hydrOXYzine  50 mg Oral TID    magnesium carbonate  54 mg Oral TID    propranolol  40 mg Oral BID    rOPINIRole  1 mg Oral Nightly    traZODone  100 mg Oral Nightly    vitamin B-1  100 mg Oral Daily    sodium chloride flush  10 mL Intravenous 2 times per day    famotidine  20 mg Oral BID    [Held by provider] enoxaparin  40 mg Subcutaneous Daily    multivitamin  1 tablet Oral Daily    budesonide-formoterol  2 puff Inhalation BID     Continuous Infusions:   sodium chloride 100 mL/hr at 10/19/20 0239     PRN Meds:oxyCODONE-acetaminophen, LORazepam, sodium chloride flush, acetaminophen **OR** [DISCONTINUED] acetaminophen, polyethylene glycol, promethazine **OR** ondansetron, nicotine, albuterol    SOCIAL HISTORY:     Tobacco:   reports that she has been smoking cigarettes. She has a 30.00 pack-year smoking history. She has never used smokeless tobacco.  Alcohol:   reports current alcohol use. Illicit drugs:  reports current drug use. Drug: Marijuana. FAMILY HISTORY:     Family History   Problem Relation Age of Onset    Other Father     Cancer Maternal Grandmother     Heart Disease Paternal Grandmother        REVIEW OF SYSTEMS:    Constitutional: No fever, no chills, no lethargy, no weakness. HEENT:  No headache, otalgia, itchy eyes, nasal discharge or sore throat. Cardiac:  No chest pain, + dyspnea, orthopnea or PND. Chest:   No cough, phlegm or wheezing. Abdomen:  + abdominal pain, nausea or vomiting. Neuro:  No focal weakness, abnormal movements or seizure like activity. Skin:   No rashes, no itching. :   No hematuria, no pyuria, no dysuria, no flank pain. Extremities:  No swelling or joint pains. ROS was otherwise negative except as mentioned in the [address]      PHYSICAL EXAM:    BP (!) 156/92   Pulse 75   Temp 97.7 °F (36.5 °C) (Axillary)   Resp 23   Ht 5' 5\" (1.651 m)   Wt 127 lb 4.8 oz (57.7 kg)   SpO2 100%   BMI 21.18 kg/m²     GENERAL:  Pale, chronically ill, Well developed, No apparent distress  HEAD:   Normocephalic, Atraumatic  EENT:   EOMI, Sclera not icteric, Oropharynx moist   NECK:   Supple, Trachea midline  LUNGS:  Decreased aearation Bilaterally  HEART:  RRR, No murmur  ABDOMEN:   Soft, mild epigastric tenderness, Nondistended, BS WNL  EXT:   No clubbing. No cyanosis. No edema. SKIN:   No rashes. No jaundice. No stigmata of liver disease. MUSC/SKEL:   Adequate muscle bulk for patient's age, No significant synovitis, No deformities  NEURO:  A&O x Three with mild confusion, CN II- XII grossly intact, positive alcohol withdrawal tremors      LABS AND IMAGING:     CBC  Recent Labs     10/17/20  0546 10/18/20  0716 10/19/20  0102 10/19/20  0729   WBC 11.0 10.2  --  9.2   HGB 8.7* 8.1* 8.4* 8.4*  8.3*   HCT 29.3* 27.2* 27.1* 27.3*  27.8*   .5* 107.5*  --  109.9*   MCH 32.2 32.0  --  32.8   MCHC 29.7 29.8  --  29.9    313  --  344       IMMATURE PLTs  Lab Results   Component Value Date    PLTFLUORE 129 07/10/2020       BMP  Recent Labs     10/18/20  0716 10/19/20  0121 10/19/20  0729    140 141   K 2.9* 3.3* 3.0*    105 106   CO2 24 25 24   BUN 11 9 9   CREATININE 0.32* 0.22* <0.20*   GLUCOSE 92 132* 100*   CALCIUM 8.0* 7.8* 7.9*       LFTS  Recent Labs     10/17/20  0546 10/18/20  0716 10/19/20  0729   ALKPHOS 258* 214* 215*   ALT 17 13 13   AST 60* 38* 35*   PROT 5.3* 5.1* 5.1*   BILITOT 0.80 0.61 0.51   LABALBU 1.9* 1.9* 2.0*       AMYLASE/LIPASE/AMMONIA  No results for input(s): AMYLASE, LIPASE, AMMONIA in the last 72 hours. PT/INR  No results for input(s): PROTIME, INR in the last 72 hours. ANEMIA STUDIES  No results for input(s): IRON, LABIRON, TIBC, UIBC, FERRITIN, [id number], FOLATE, OCCULTBLD in the last 72 hours.     IMAGING  Xr Chest Portable    Result Date: 10/17/2020  EXAMINATION: ONE XRAY VIEW OF THE CHEST 10/17/2020 9:42 am COMPARISON: 16 October 2020 HISTORY: ORDERING SYSTEM PROVIDED HISTORY: f/u TECHNOLOGIST PROVIDED HISTORY: f/u FINDINGS: AP portable view of the chest time stamped at 913 hours demonstrates overlying cardiac monitoring electrodes. The patient has bilateral effusions, diminished from prior study. Mild interval improvement in scattered pulmonary opacities is noted. No extrapleural air. Heart size is normal.     Mild interval improvement in scattered pulmonary opacities. Decrease in size of pleural effusions, now minimal.         IMPRESSION:     48-year-old female with a history of depression, bipolar disorder, PTSD, hypertension, seizures, COPD currently smoking 1 pack/day, and alcohol use disorder, drinking 1 pint of rum daily who presented to the hospital 10/12/2020 after patient was noted to be hypoxic at her PCPs office. SPO2 was 84% in ED and patient admitted with COPD exacerbation. Patient has been medically treated with BiPAP, steroids, and bronchodilators. Patient had a decline in her hemoglobin since admission from 9.4 to 8.1 gm/dl. Stool was occult blood positive. GI consulted for GI bleed. 1. Anemia with occult positive stool  - No overt signs of GI bleeding. Will need EGD/colonoscopy to R/O sources such as  Esophagitis/gastritic, peptic ulcer disease, AVMs or malignancy      2. Alcohol use disorder -consuming 1 pint of rum daily.  - Having Alcohol withdrawl    3. COPD exacerbation - Current Tobacco use   - On BiPap PRN    4. HX of hepatomegaly with fatty liver    5. Elevated LFTs - Primarily alk phos - ? Partial CBD obstruction from pancreatic lesion vs stone    6. Abnormal US liver - Dilated CBD with cystic lesion of pancreatic head         PLAN   1. Monitor hemoglobin hematocrit. Transfuse to keep hemoglobin greater than 7 g/dL  2. Liver ultrasound - completed and showed dilated CBD with cystic lesion in pancreatic head.    3.  Consider EGD and colonoscopy when medically optimized  4. MRI/MRCP to further assess pancreas and biliary tree  5. [place] for soft diet from GI perspective  6. Alcohol abstinence    This plan was formulated in collaboration with Dr. Ilene Dumont  Thank you for allowing us to participate in the care of your patient. Electronically signed by: Keyon ADAIR on 10/19/2020 at 9:54 AM     Please note that this note was generated using a voice recognition dictation software. Although every effort was made to ensure the accuracy of this automated transcription, some errors in transcription may have occurred.

## 2020-10-20 ENCOUNTER — APPOINTMENT (OUTPATIENT)
Dept: CT IMAGING | Age: 50
DRG: 190 | End: 2020-10-20
Payer: COMMERCIAL

## 2020-10-20 PROBLEM — R97.8 ELEVATED CA 19-9 LEVEL: Status: ACTIVE | Noted: 2020-10-20

## 2020-10-20 PROBLEM — F33.9 RECURRENT MAJOR DEPRESSIVE DISORDER (HCC): Status: ACTIVE | Noted: 2020-10-20

## 2020-10-20 LAB
ABSOLUTE EOS #: 0.09 K/UL (ref 0–0.44)
ABSOLUTE IMMATURE GRANULOCYTE: 0.09 K/UL (ref 0–0.3)
ABSOLUTE LYMPH #: 1.27 K/UL (ref 1.1–3.7)
ABSOLUTE MONO #: 1 K/UL (ref 0.1–1.2)
ALBUMIN SERPL-MCNC: 2 G/DL (ref 3.5–5.2)
ALBUMIN/GLOBULIN RATIO: 0.6 (ref 1–2.5)
ALP BLD-CCNC: 247 U/L (ref 35–104)
ALT SERPL-CCNC: 19 U/L (ref 5–33)
ANION GAP SERPL CALCULATED.3IONS-SCNC: 9 MMOL/L (ref 9–17)
AST SERPL-CCNC: 74 U/L
BASOPHILS # BLD: 0 % (ref 0–2)
BASOPHILS ABSOLUTE: 0 K/UL (ref 0–0.2)
BILIRUB SERPL-MCNC: 0.46 MG/DL (ref 0.3–1.2)
BUN BLDV-MCNC: 8 MG/DL (ref 6–20)
BUN/CREAT BLD: ABNORMAL (ref 9–20)
C-REACTIVE PROTEIN: 97.7 MG/L (ref 0–5)
CALCIUM SERPL-MCNC: 7.7 MG/DL (ref 8.6–10.4)
CHLORIDE BLD-SCNC: 112 MMOL/L (ref 98–107)
CO2: 23 MMOL/L (ref 20–31)
CREAT SERPL-MCNC: 0.25 MG/DL (ref 0.5–0.9)
DIFFERENTIAL TYPE: ABNORMAL
EOSINOPHILS RELATIVE PERCENT: 1 % (ref 1–4)
GFR AFRICAN AMERICAN: >60 ML/MIN
GFR NON-AFRICAN AMERICAN: >60 ML/MIN
GFR SERPL CREATININE-BSD FRML MDRD: ABNORMAL ML/MIN/{1.73_M2}
GFR SERPL CREATININE-BSD FRML MDRD: ABNORMAL ML/MIN/{1.73_M2}
GLUCOSE BLD-MCNC: 84 MG/DL (ref 70–99)
HCT VFR BLD CALC: 27.1 % (ref 36.3–47.1)
HCT VFR BLD CALC: 28.7 % (ref 36.3–47.1)
HCT VFR BLD CALC: 29.3 % (ref 36.3–47.1)
HCT VFR BLD CALC: 29.7 % (ref 36.3–47.1)
HCT VFR BLD CALC: 30.5 % (ref 36.3–47.1)
HEMOGLOBIN: 8.4 G/DL (ref 11.9–15.1)
HEMOGLOBIN: 8.5 G/DL (ref 11.9–15.1)
HEMOGLOBIN: 8.6 G/DL (ref 11.9–15.1)
HEMOGLOBIN: 8.7 G/DL (ref 11.9–15.1)
HEMOGLOBIN: 9 G/DL (ref 11.9–15.1)
IMMATURE GRANULOCYTES: 1 %
LYMPHOCYTES # BLD: 14 % (ref 24–43)
MAGNESIUM: 1.4 MG/DL (ref 1.6–2.6)
MAGNESIUM: 1.4 MG/DL (ref 1.6–2.6)
MCH RBC QN AUTO: 32 PG (ref 25.2–33.5)
MCHC RBC AUTO-ENTMCNC: 28.6 G/DL (ref 28.4–34.8)
MCV RBC AUTO: 111.7 FL (ref 82.6–102.9)
MONOCYTES # BLD: 11 % (ref 3–12)
MORPHOLOGY: ABNORMAL
MORPHOLOGY: ABNORMAL
NRBC AUTOMATED: 0 PER 100 WBC
PDW BLD-RTO: 16.8 % (ref 11.8–14.4)
PLATELET # BLD: 405 K/UL (ref 138–453)
PLATELET ESTIMATE: ABNORMAL
PMV BLD AUTO: 10.8 FL (ref 8.1–13.5)
POTASSIUM SERPL-SCNC: 3.3 MMOL/L (ref 3.7–5.3)
RBC # BLD: 2.66 M/UL (ref 3.95–5.11)
RBC # BLD: ABNORMAL 10*6/UL
SEDIMENTATION RATE, ERYTHROCYTE: 53 MM (ref 0–30)
SEG NEUTROPHILS: 73 % (ref 36–65)
SEGMENTED NEUTROPHILS ABSOLUTE COUNT: 6.65 K/UL (ref 1.5–8.1)
SODIUM BLD-SCNC: 144 MMOL/L (ref 135–144)
TOTAL PROTEIN: 5.1 G/DL (ref 6.4–8.3)
WBC # BLD: 9.1 K/UL (ref 3.5–11.3)
WBC # BLD: ABNORMAL 10*3/UL

## 2020-10-20 PROCEDURE — APPSS30 APP SPLIT SHARED TIME 16-30 MINUTES: Performed by: INTERNAL MEDICINE

## 2020-10-20 PROCEDURE — 85652 RBC SED RATE AUTOMATED: CPT

## 2020-10-20 PROCEDURE — 6360000004 HC RX CONTRAST MEDICATION: Performed by: INTERNAL MEDICINE

## 2020-10-20 PROCEDURE — 94640 AIRWAY INHALATION TREATMENT: CPT

## 2020-10-20 PROCEDURE — 87506 IADNA-DNA/RNA PROBE TQ 6-11: CPT

## 2020-10-20 PROCEDURE — 6370000000 HC RX 637 (ALT 250 FOR IP): Performed by: NURSE PRACTITIONER

## 2020-10-20 PROCEDURE — 85025 COMPLETE CBC W/AUTO DIFF WBC: CPT

## 2020-10-20 PROCEDURE — 85014 HEMATOCRIT: CPT

## 2020-10-20 PROCEDURE — 2060000000 HC ICU INTERMEDIATE R&B

## 2020-10-20 PROCEDURE — 6360000002 HC RX W HCPCS: Performed by: INTERNAL MEDICINE

## 2020-10-20 PROCEDURE — 36415 COLL VENOUS BLD VENIPUNCTURE: CPT

## 2020-10-20 PROCEDURE — 6370000000 HC RX 637 (ALT 250 FOR IP): Performed by: INTERNAL MEDICINE

## 2020-10-20 PROCEDURE — 2500000003 HC RX 250 WO HCPCS: Performed by: INTERNAL MEDICINE

## 2020-10-20 PROCEDURE — 99232 SBSQ HOSP IP/OBS MODERATE 35: CPT | Performed by: INTERNAL MEDICINE

## 2020-10-20 PROCEDURE — 2580000003 HC RX 258: Performed by: NURSE PRACTITIONER

## 2020-10-20 PROCEDURE — 2700000000 HC OXYGEN THERAPY PER DAY

## 2020-10-20 PROCEDURE — 86140 C-REACTIVE PROTEIN: CPT

## 2020-10-20 PROCEDURE — 83993 ASSAY FOR CALPROTECTIN FECAL: CPT

## 2020-10-20 PROCEDURE — 99254 IP/OBS CNSLTJ NEW/EST MOD 60: CPT | Performed by: INTERNAL MEDICINE

## 2020-10-20 PROCEDURE — 6370000000 HC RX 637 (ALT 250 FOR IP): Performed by: PSYCHIATRY & NEUROLOGY

## 2020-10-20 PROCEDURE — 99232 SBSQ HOSP IP/OBS MODERATE 35: CPT | Performed by: PSYCHIATRY & NEUROLOGY

## 2020-10-20 PROCEDURE — 94761 N-INVAS EAR/PLS OXIMETRY MLT: CPT

## 2020-10-20 PROCEDURE — 74177 CT ABD & PELVIS W/CONTRAST: CPT

## 2020-10-20 PROCEDURE — 94660 CPAP INITIATION&MGMT: CPT

## 2020-10-20 PROCEDURE — 80053 COMPREHEN METABOLIC PANEL: CPT

## 2020-10-20 PROCEDURE — 85018 HEMOGLOBIN: CPT

## 2020-10-20 PROCEDURE — 83520 IMMUNOASSAY QUANT NOS NONAB: CPT

## 2020-10-20 PROCEDURE — 83735 ASSAY OF MAGNESIUM: CPT

## 2020-10-20 RX ORDER — MAGNESIUM SULFATE 1 G/100ML
1 INJECTION INTRAVENOUS PRN
Status: DISCONTINUED | OUTPATIENT
Start: 2020-10-20 | End: 2020-10-26 | Stop reason: HOSPADM

## 2020-10-20 RX ORDER — POTASSIUM CHLORIDE 20 MEQ/1
40 TABLET, EXTENDED RELEASE ORAL PRN
Status: DISCONTINUED | OUTPATIENT
Start: 2020-10-20 | End: 2020-10-26 | Stop reason: HOSPADM

## 2020-10-20 RX ORDER — CALCIUM GLUCONATE 20 MG/ML
1 INJECTION, SOLUTION INTRAVENOUS ONCE
Status: COMPLETED | OUTPATIENT
Start: 2020-10-20 | End: 2020-10-20

## 2020-10-20 RX ORDER — LOPERAMIDE HYDROCHLORIDE 2 MG/1
2 CAPSULE ORAL ONCE
Status: COMPLETED | OUTPATIENT
Start: 2020-10-20 | End: 2020-10-20

## 2020-10-20 RX ORDER — POTASSIUM CHLORIDE 7.45 MG/ML
10 INJECTION INTRAVENOUS PRN
Status: DISCONTINUED | OUTPATIENT
Start: 2020-10-20 | End: 2020-10-26 | Stop reason: HOSPADM

## 2020-10-20 RX ORDER — DULOXETIN HYDROCHLORIDE 30 MG/1
60 CAPSULE, DELAYED RELEASE ORAL DAILY
Status: DISCONTINUED | OUTPATIENT
Start: 2020-10-21 | End: 2020-10-26 | Stop reason: HOSPADM

## 2020-10-20 RX ADMIN — Medication 54 MG: at 09:06

## 2020-10-20 RX ADMIN — Medication 54 MG: at 21:47

## 2020-10-20 RX ADMIN — AMOXICILLIN AND CLAVULANATE POTASSIUM 1 TABLET: 500; 125 TABLET, FILM COATED ORAL at 17:29

## 2020-10-20 RX ADMIN — GABAPENTIN 200 MG: 100 CAPSULE ORAL at 14:38

## 2020-10-20 RX ADMIN — IOHEXOL 50 ML: 240 INJECTION, SOLUTION INTRATHECAL; INTRAVASCULAR; INTRAVENOUS; ORAL at 10:37

## 2020-10-20 RX ADMIN — LORAZEPAM 1 MG: 2 INJECTION INTRAMUSCULAR; INTRAVENOUS at 21:47

## 2020-10-20 RX ADMIN — PREDNISONE 40 MG: 20 TABLET ORAL at 08:55

## 2020-10-20 RX ADMIN — IOPAMIDOL 75 ML: 755 INJECTION, SOLUTION INTRAVENOUS at 12:47

## 2020-10-20 RX ADMIN — BUSPIRONE HYDROCHLORIDE 15 MG: 15 TABLET ORAL at 08:55

## 2020-10-20 RX ADMIN — AMLODIPINE BESYLATE 5 MG: 10 TABLET ORAL at 08:56

## 2020-10-20 RX ADMIN — FAMOTIDINE 20 MG: 20 TABLET ORAL at 08:55

## 2020-10-20 RX ADMIN — HYDROXYZINE HYDROCHLORIDE 50 MG: 25 TABLET, FILM COATED ORAL at 21:47

## 2020-10-20 RX ADMIN — ROPINIROLE HYDROCHLORIDE 1 MG: 1 TABLET, FILM COATED ORAL at 21:47

## 2020-10-20 RX ADMIN — AMOXICILLIN AND CLAVULANATE POTASSIUM 1 TABLET: 500; 125 TABLET, FILM COATED ORAL at 09:05

## 2020-10-20 RX ADMIN — OXYCODONE HYDROCHLORIDE AND ACETAMINOPHEN 1 TABLET: 5; 325 TABLET ORAL at 21:47

## 2020-10-20 RX ADMIN — HYDROXYZINE HYDROCHLORIDE 50 MG: 25 TABLET, FILM COATED ORAL at 08:55

## 2020-10-20 RX ADMIN — IPRATROPIUM BROMIDE AND ALBUTEROL SULFATE 1 AMPULE: .5; 3 SOLUTION RESPIRATORY (INHALATION) at 07:45

## 2020-10-20 RX ADMIN — DULOXETINE 40 MG: 20 CAPSULE, DELAYED RELEASE ORAL at 09:07

## 2020-10-20 RX ADMIN — THERA TABS 1 TABLET: TAB at 09:09

## 2020-10-20 RX ADMIN — CHLORDIAZEPOXIDE HYDROCHLORIDE 10 MG: 5 CAPSULE ORAL at 21:00

## 2020-10-20 RX ADMIN — LOPERAMIDE HYDROCHLORIDE 2 MG: 2 CAPSULE ORAL at 22:26

## 2020-10-20 RX ADMIN — GABAPENTIN 200 MG: 100 CAPSULE ORAL at 08:55

## 2020-10-20 RX ADMIN — FERROUS SULFATE TAB EC 325 MG (65 MG FE EQUIVALENT) 325 MG: 325 (65 FE) TABLET DELAYED RESPONSE at 09:08

## 2020-10-20 RX ADMIN — CHLORDIAZEPOXIDE HYDROCHLORIDE 10 MG: 5 CAPSULE ORAL at 14:39

## 2020-10-20 RX ADMIN — TRAZODONE HYDROCHLORIDE 100 MG: 100 TABLET ORAL at 21:47

## 2020-10-20 RX ADMIN — FAMOTIDINE 20 MG: 20 TABLET ORAL at 21:47

## 2020-10-20 RX ADMIN — LORAZEPAM 1 MG: 2 INJECTION INTRAMUSCULAR; INTRAVENOUS at 05:09

## 2020-10-20 RX ADMIN — PROPRANOLOL HYDROCHLORIDE 40 MG: 40 TABLET ORAL at 21:47

## 2020-10-20 RX ADMIN — SODIUM CHLORIDE: 9 INJECTION, SOLUTION INTRAVENOUS at 03:00

## 2020-10-20 RX ADMIN — SODIUM CHLORIDE, PRESERVATIVE FREE 10 ML: 5 INJECTION INTRAVENOUS at 09:11

## 2020-10-20 RX ADMIN — FERROUS SULFATE TAB EC 325 MG (65 MG FE EQUIVALENT) 325 MG: 325 (65 FE) TABLET DELAYED RESPONSE at 21:47

## 2020-10-20 RX ADMIN — CALCIUM GLUCONATE 1 G: 20 INJECTION, SOLUTION INTRAVENOUS at 14:39

## 2020-10-20 RX ADMIN — AMOXICILLIN AND CLAVULANATE POTASSIUM 1 TABLET: 500; 125 TABLET, FILM COATED ORAL at 02:00

## 2020-10-20 RX ADMIN — BUSPIRONE HYDROCHLORIDE 15 MG: 15 TABLET ORAL at 21:48

## 2020-10-20 RX ADMIN — Medication 100 MG: at 09:10

## 2020-10-20 RX ADMIN — CARBAMAZEPINE 200 MG: 100 TABLET, EXTENDED RELEASE ORAL at 08:54

## 2020-10-20 RX ADMIN — HYDROXYZINE HYDROCHLORIDE 50 MG: 25 TABLET, FILM COATED ORAL at 14:38

## 2020-10-20 RX ADMIN — Medication 54 MG: at 14:39

## 2020-10-20 RX ADMIN — PROPRANOLOL HYDROCHLORIDE 40 MG: 40 TABLET ORAL at 09:06

## 2020-10-20 RX ADMIN — MAGNESIUM SULFATE HEPTAHYDRATE 1 G: 1 INJECTION, SOLUTION INTRAVENOUS at 09:30

## 2020-10-20 RX ADMIN — CARBAMAZEPINE 200 MG: 100 TABLET, EXTENDED RELEASE ORAL at 14:38

## 2020-10-20 RX ADMIN — CHLORDIAZEPOXIDE HYDROCHLORIDE 10 MG: 5 CAPSULE ORAL at 09:05

## 2020-10-20 RX ADMIN — BUDESONIDE AND FORMOTEROL FUMARATE DIHYDRATE 2 PUFF: 160; 4.5 AEROSOL RESPIRATORY (INHALATION) at 07:58

## 2020-10-20 RX ADMIN — POTASSIUM CHLORIDE 40 MEQ: 1500 TABLET, EXTENDED RELEASE ORAL at 09:07

## 2020-10-20 RX ADMIN — MAGNESIUM SULFATE HEPTAHYDRATE 1 G: 1 INJECTION, SOLUTION INTRAVENOUS at 10:40

## 2020-10-20 RX ADMIN — FOLIC ACID 1 MG: 1 TABLET ORAL at 09:07

## 2020-10-20 RX ADMIN — BUDESONIDE AND FORMOTEROL FUMARATE DIHYDRATE 2 PUFF: 160; 4.5 AEROSOL RESPIRATORY (INHALATION) at 21:18

## 2020-10-20 RX ADMIN — IPRATROPIUM BROMIDE AND ALBUTEROL SULFATE 1 AMPULE: .5; 3 SOLUTION RESPIRATORY (INHALATION) at 21:18

## 2020-10-20 RX ADMIN — GABAPENTIN 200 MG: 100 CAPSULE ORAL at 21:47

## 2020-10-20 RX ADMIN — IPRATROPIUM BROMIDE AND ALBUTEROL SULFATE 1 AMPULE: .5; 3 SOLUTION RESPIRATORY (INHALATION) at 11:38

## 2020-10-20 RX ADMIN — BUSPIRONE HYDROCHLORIDE 15 MG: 15 TABLET ORAL at 01:00

## 2020-10-20 RX ADMIN — BUSPIRONE HYDROCHLORIDE 15 MG: 15 TABLET ORAL at 14:39

## 2020-10-20 RX ADMIN — CARBAMAZEPINE 200 MG: 100 TABLET, EXTENDED RELEASE ORAL at 21:47

## 2020-10-20 ASSESSMENT — PAIN SCALES - GENERAL
PAINLEVEL_OUTOF10: 9
PAINLEVEL_OUTOF10: 7

## 2020-10-20 ASSESSMENT — ENCOUNTER SYMPTOMS
ABDOMINAL PAIN: 0
DIARRHEA: 1
COUGH: 0
NAUSEA: 0
SHORTNESS OF BREATH: 0
VOMITING: 0

## 2020-10-20 ASSESSMENT — PAIN - FUNCTIONAL ASSESSMENT: PAIN_FUNCTIONAL_ASSESSMENT: PREVENTS OR INTERFERES SOME ACTIVE ACTIVITIES AND ADLS

## 2020-10-20 ASSESSMENT — PAIN DESCRIPTION - FREQUENCY: FREQUENCY: INTERMITTENT

## 2020-10-20 ASSESSMENT — PAIN DESCRIPTION - DESCRIPTORS: DESCRIPTORS: ACHING;CONSTANT;SHARP;SORE

## 2020-10-20 ASSESSMENT — PAIN DESCRIPTION - LOCATION: LOCATION: FOOT

## 2020-10-20 ASSESSMENT — PAIN DESCRIPTION - PROGRESSION: CLINICAL_PROGRESSION: NOT CHANGED

## 2020-10-20 ASSESSMENT — PAIN DESCRIPTION - ORIENTATION: ORIENTATION: RIGHT;LEFT

## 2020-10-20 ASSESSMENT — PAIN DESCRIPTION - ONSET: ONSET: ON-GOING

## 2020-10-20 ASSESSMENT — PAIN DESCRIPTION - PAIN TYPE: TYPE: CHRONIC PAIN

## 2020-10-20 NOTE — CONSULTS
Department of Psychiatry  Behavioral Health Consult progress note    REASON FOR CONSULT: Worsening depression, PTSD, alcohol abuse    CONSULTING PHYSICIAN: Dr. Irma Garber    History obtained from: patient, chart and patient's mother    Interim history. -      The patient was seen at bedside. She has been tolerating her duloxetine without any problems. She reports no change in her mood. She recognizes the harmful effects of alcohol on her mental health and ability to care for herself. The patient is unwilling to consider residential rehab willing to have outpatient treatment for alcohol use. The patient agrees to have a further increase in the dose of duloxetine.      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    FRACTURE SURGERY Left 7-3-13    ORIF tibial plateau    FRACTURE SURGERY Right     small finger metacarpal fracture    HYSTERECTOMY  2003         Medications Prior to Admission:   Medications Prior to Admission: tiotropium (SPIRIVA RESPIMAT) 1.25 MCG/ACT AERS inhaler, Inhale 2 puffs into the lungs daily  tiZANidine (ZANAFLEX) 4 MG tablet, Take 1 tablet by mouth every 8 hours as needed (back pain)  potassium chloride (KLOR-CON M) 20 MEQ extended release tablet, Take 1 tablet by mouth daily  magnesium carbonate (MAGONATE) 54 (Mag Equiv) MG/5ML LIQD oral liquid, Take 5 mLs by mouth 3 times daily  ferrous sulfate (IRON 325) 325 (65 Fe) MG tablet, Take 1 tablet by mouth 2 times daily  rOPINIRole (REQUIP) 1 MG tablet, Take 1 tablet by mouth nightly  ACETAMINOPHEN EXTRA STRENGTH 500 MG tablet, Take 500 mg by mouth 3 times daily as needed  ibuprofen (ADVIL;MOTRIN) 800 MG tablet, Take 800 mg by mouth 3 times daily as needed  propranolol (INDERAL) 40 MG tablet, Take 40 mg by mouth 2 times daily  albuterol sulfate HFA (VENTOLIN HFA) 108 (90 Base) MCG/ACT inhaler, Inhale 2 puffs into the lungs 4 times daily as needed for Wheezing  busPIRone (BUSPAR) 15 MG tablet, Take 15 mg by mouth every 6 hours  escitalopram (LEXAPRO) 20 MG tablet, Take 1.5 tablets by mouth daily  traZODone (DESYREL) 100 MG tablet, Take 1 tablet by mouth nightly  carBAMazepine (TEGRETOL XR) 200 MG extended release tablet, Take 1 tablet by mouth 3 times daily  amLODIPine (NORVASC) 5 MG tablet, Take 1 tablet by mouth daily  gabapentin (NEURONTIN) 100 MG capsule, Take 2 capsules by mouth 3 times daily for 180 days. Intended supply: 90 days  hydrOXYzine (ATARAX) 50 MG tablet, Take 1 tablet by mouth 3 times daily  acamprosate (CAMPRAL) 333 MG tablet, Take 2 tablets by mouth 3 times daily  vitamin B-1 (THIAMINE) 100 MG tablet, Take 1 tablet by mouth daily  vitamin D (ERGOCALCIFEROL) 01482 units CAPS capsule, Take 1 capsule by mouth once a week  folic acid (FOLVITE) 1 MG tablet, Take 1 tablet by mouth daily    Allergies:  Patient has no known allergies.     FAMILY/SOCIAL HISTORY:  Family History   Problem Relation Age of Onset    Other Father     Cancer Maternal Grandmother     Heart Disease Paternal Grandmother      Social History     Socioeconomic History    Marital status: Single     Spouse name: Not on file    Number of children: Not on file    Years of education: Not on file    Highest education level: Not on file   Occupational History    Not on file   Social Needs    Financial resource strain: Not on file    Food insecurity     Worry: Not on file     Inability: Not on file    Transportation needs     Medical: Not on file     Non-medical: Not on file   Tobacco Use    Smoking status: Current Every Day Smoker     Packs/day: 1.00     Years: 30.00     Pack years: 30.00     Types: Cigarettes  Smokeless tobacco: Never Used   Substance and Sexual Activity    Alcohol use: Yes     Alcohol/week: 0.0 standard drinks    Drug use: Yes     Types: Marijuana     Comment: occasional    Sexual activity: Not on file   Lifestyle    Physical activity     Days per week: Not on file     Minutes per session: Not on file    Stress: Not on file   Relationships    Social connections     Talks on phone: Not on file     Gets together: Not on file     Attends [employer] service: Not on file     Active member of club or organization: Not on file     Attends meetings of clubs or organizations: Not on file     Relationship status: Not on file    Intimate partner violence     Fear of current or ex partner: Not on file     Emotionally abused: Not on file     Physically abused: Not on file     Forced sexual activity: Not on file   Other Topics Concern    Not on file   Social History Narrative    Not on file       REVIEW OF SYSTEMS    Constitutional: [] fever  [] chills  [] weight loss  []weakness [] Other:  Eyes:  [] photophobia  [] discharge [] acuity change   [] Diplopia   [] Other:  HENT:  [] sore throat  [] ear pain [] Tinnitus   [] Other  Respiratory:  [x] Cough  [x] Shortness of breath   [] Sputum   [] Other:   Cardiac: []Chest pain   []Palpitations []Edema  []PND  [] Other:  GI:  []Abdominal pain   []Nausea  []Vomiting  [x]Diarrhea  [] Other:  :  [] Dysuria   []Frequency  []Hematuria  []Discharge  [] Other:  Possible Pregnancy: []Yes   []No   LMP:   Musculoskeletal:  []Back pain  []Neck pain  []Recent Injury   Skin:  []Rash  [] Itching  [] Other:  Neurologic:  [] Headache  [] Focal weakness  [] Sensory changes []Other:  Endocrine:  [] Polyuria  [] Polydipsia  [] Hair Loss  [] Other:  Lymphatic:   [] Swollen glands   Psychiatric:  As per HPI      All other systems negative except as marked or mentioned/indicated in the HPI. Keon Alonso      PHYSICAL EXAM:  Vitals:  BP (!) 137/90   Pulse 67   Temp 96.6 °F (35.9 °C) (Oral) Resp 22   Ht 5' 5\" (1.651 m)   Wt 126 lb 12.8 oz (57.5 kg)   SpO2 99%   BMI 21.10 kg/m²      Neuro Exam:   Muscle Strength & Tone: normal  Gait: Not tested   Involuntary Movements: No    Mental Status Examination:    Level of consciousness:  within normal limits   Appearance:  lying in bed, fair grooming and fair hygiene  Behavior/Motor:  no abnormalities noted  Attitude toward examiner:  cooperative  Speech:  Slow, normal volume  Mood: constricted and depressed  Affect:  mood congruent  Thought processes:  goal directed and coherent   Thought content:  Suicidal Ideation:  denies suicidal ideation  Delusions:  no evidence of delusions  Perceptual Disturbance:  denies any perceptual disturbance  Cognition:  oriented to person, place, and time   Concentration intact  Memory intact  Insight fair   Judgement fair   Fund of Knowledge adequate        LABS: REVIEWED TODAY:  Recent Labs     10/18/20  0716  10/19/20  0729  10/20/20  0533 10/20/20  0858 10/20/20  1538   WBC 10.2  --  9.2  --  9.1  --   --    HGB 8.1*   < > 8.4*  8.3*   < > 8.5* 8.6* 8.7*     --  344  --  405  --   --     < > = values in this interval not displayed.      Recent Labs     10/19/20  0121 10/19/20  0729 10/20/20  0533    141 144   K 3.3* 3.0* 3.3*    106 112*   CO2 25 24 23   BUN 9 9 8   CREATININE 0.22* <0.20* 0.25*   GLUCOSE 132* 100* 84     Recent Labs     10/18/20  0716 10/19/20  0729 10/20/20  0533   BILITOT 0.61 0.51 0.46   ALKPHOS 214* 215* 247*   AST 38* 35* 74*   ALT 13 13 19     Lab Results   Component Value Date    BARBSCNU NEGATIVE 07/14/2019    LABBENZ POSITIVE 07/14/2019    LABMETH NEGATIVE 07/14/2019    PPXUR NOT REPORTED 07/14/2019     Lab Results   Component Value Date    TSH 0.69 07/10/2020     No results found for: LITHIUM  No results found for: VALPROATE, CBMZ  No results found for: LITHIUM, VALPROATE    FURTHER LABS ORDERED :      Radiology   Xr Chest Portable    Result Date: 10/17/2020  EXAMINATION: ONE XRAY VIEW OF THE CHEST 10/17/2020 9:42 am COMPARISON: 16 October 2020 HISTORY: ORDERING SYSTEM PROVIDED HISTORY: f/u TECHNOLOGIST PROVIDED HISTORY: f/u FINDINGS: AP portable view of the chest time stamped at 913 hours demonstrates overlying cardiac monitoring electrodes. The patient has bilateral effusions, diminished from prior study. Mild interval improvement in scattered pulmonary opacities is noted. No extrapleural air. Heart size is normal.     Mild interval improvement in scattered pulmonary opacities. Decrease in size of pleural effusions, now minimal.     Xr Chest Portable    Result Date: 10/16/2020  EXAMINATION: ONE XRAY VIEW OF THE CHEST 10/16/2020 11:52 am COMPARISON: October 14, 2020 HISTORY: ORDERING SYSTEM PROVIDED HISTORY: SOB TECHNOLOGIST PROVIDED HISTORY: SOB Reason for Exam: sob port upr at 1110am FINDINGS: There is no evidence of pneumothorax. Is small bilateral pleural effusions right greater than left. There is worsening right lower lobe infiltrate. There is also developing opacity in the left lower lobe which may represent new or infiltrate. Trachea is midline. Heart mediastinum are stable. Visualized bony thorax shows no acute abnormality. Small bilateral pleural effusions. Worsening right lower lobe infiltrate and possibly new left lower lobe infiltrate. Xr Chest Portable    Result Date: 10/14/2020  EXAMINATION: ONE XRAY VIEW OF THE CHEST 10/14/2020 9:53 pm COMPARISON: 10/13/2020 HISTORY: ORDERING SYSTEM PROVIDED HISTORY: hypoxia TECHNOLOGIST PROVIDED HISTORY: hypoxia Reason for Exam: hypoxia    upright port FINDINGS: Cardiomediastinal silhouette is unchanged in size. Small right pleural effusion. Linear opacity in the right lung base. No pneumothorax. No acute osseous abnormality. Small right pleural effusion. Linear opacity in the right lung base, atelectasis versus pneumonia.      Xr Chest Portable    Result Date: 10/13/2020  EXAMINATION: ONE XRAY VIEW OF THE CHEST 10/13/2020 8:09 am COMPARISON: October 12, 2020 HISTORY: ORDERING SYSTEM PROVIDED HISTORY: AECOPD TECHNOLOGIST PROVIDED HISTORY: AECOPD Reason for Exam: AECOPD/  AP erect/ port. Acuity: Unknown Type of Exam: Unknown FINDINGS: Is no evidence of focal infiltrate, effusion, or pneumothorax. There are chronic changes so she was COPD unchanged from previous. The heart mediastinum appear normal.  Trachea is midline. Visualized bony thorax shows no acute abnormality. Clear chest, stable when compared to previous. Xr Chest Portable    Result Date: 10/12/2020  EXAMINATION: ONE XRAY VIEW OF THE CHEST 10/12/2020 8:33 pm COMPARISON: 06/02/2020 HISTORY: ORDERING SYSTEM PROVIDED HISTORY: SIRD(+) Reason for Exam: shortness of breath    upright port FINDINGS: The lungs are without acute focal process. No effusion or pneumothorax. The cardiomediastinal silhouette is normal.  The osseous structures are intact without acute process. Negative chest.     Vl Dup Lower Extremity Venous Bilateral    Result Date: 10/13/2020    [facility]  Vascular Lower Extremities DVT Study Procedure   Patient Name   Judith Ward     Date of Study           10/13/2020                 PAM WYNN   Date of Birth  1970  Gender                  Female   Age            45 year(s)  Race                       Room Number    2003   Corporate ID # [id number]   Patient Acct # [de-identified]   MR #           [id number]     Samia Juárez [facility]   Accession #    [id number]  Interpreting Physician  Norm Angles   Referring                  Referring Physician     Adonis King  Nurse  Ziggy  Procedure Type of Study:   Veins: Lower Extremities DVT Study, Venous Scan Lower Bilateral.  Indications for Study:Pain, leg and Hypoxia. Patient Status:ER. Technical Quality:Limited visualization. Limitation reason:Limited visualization of the calf veins due to small size in diameter.   Conclusions   Summary Simultaneous real time imaging utilizing B-Mode, color doppler and  spectral waveform analysis was performed on the bilateral lower  extremities for venous examination of the deep and superficial systems. Findings are:   Right:  No evidence of deep or superficial venous thrombosis. Left:  No evidence of deep or superficial venous thrombosis. Signature   ----------------------------------------------------------------  Electronically signed by Carlota Calvin RVT(Sonographer) on  10/13/2020 12:47 PM  ----------------------------------------------------------------   ----------------------------------------------------------------  Electronically signed by Jessi Workman(Interpreting  physician) on 10/13/2020 10:13 PM  ----------------------------------------------------------------  Findings:   Right Impression:                    Left Impression:  The common femoral, femoral,         The common femoral, femoral,  popliteal and tibial veins           popliteal and tibial veins  demonstrate normal compressibility   demonstrate normal compressibility  and augmentation. and augmentation. Normal compressibility of the great  Normal compressibility of the great  saphenous vein. saphenous vein. Normal compressibility of the small  Normal compressibility of the small  saphenous vein. saphenous vein. Velocities are measured in cm/s ; Diameters are measured in cm Right Lower Extremities DVT Study Measurements Right 2D Measurements +------------------------------------+----------+---------------+----------+ ! Location                            ! Visualized! Compressibility! Thrombosis! +------------------------------------+----------+---------------+----------+ ! Common Femoral                      !Yes       ! Yes            ! None      ! +------------------------------------+----------+---------------+----------+ ! Prox Femoral                        !Yes !Yes            !None      ! +------------------------------------+----------+---------------+----------+ ! Mid Femoral                         !Yes       ! Yes            ! None      ! +------------------------------------+----------+---------------+----------+ ! Dist Femoral                        !Yes       ! Yes            ! None      ! +------------------------------------+----------+---------------+----------+ ! Deep Femoral                        !Yes       ! Yes            ! None      ! +------------------------------------+----------+---------------+----------+ ! Popliteal                           !Yes       ! Yes            ! None      ! +------------------------------------+----------+---------------+----------+ ! Sapheno Femoral Junction            ! Yes       ! Yes            ! None      ! +------------------------------------+----------+---------------+----------+ ! PTV                                 ! Partial   !Yes            ! None      ! +------------------------------------+----------+---------------+----------+ ! Peroneal                            !Partial   !Yes            ! None      ! +------------------------------------+----------+---------------+----------+ ! Gastroc                             ! Yes       ! Yes            ! None      ! +------------------------------------+----------+---------------+----------+ ! GSV Thigh                           ! Yes       ! Yes            ! None      ! +------------------------------------+----------+---------------+----------+ ! GSV Knee                            ! Yes       ! Yes            ! None      ! +------------------------------------+----------+---------------+----------+ ! GSV Ankle                           ! Yes       ! Yes            ! None      ! +------------------------------------+----------+---------------+----------+ ! SSV                                 ! Partial   !Yes            ! None      ! +------------------------------------+----------+---------------+----------+ ! Sapheno Femoral Junction            ! Yes       ! Yes            ! None      ! +------------------------------------+----------+---------------+----------+ ! PTV                                 ! Partial   !Yes            ! None      ! +------------------------------------+----------+---------------+----------+ ! Peroneal                            !Partial   !Yes            ! None      ! +------------------------------------+----------+---------------+----------+ ! Gastroc                             ! Yes       ! Yes            ! None      ! +------------------------------------+----------+---------------+----------+ ! GSV Thigh                           ! Yes       ! Yes            ! None      ! +------------------------------------+----------+---------------+----------+ ! GSV Knee                            ! Yes       ! Yes            ! None      ! +------------------------------------+----------+---------------+----------+ ! GSV Ankle                           ! Yes       ! Yes            ! None      ! +------------------------------------+----------+---------------+----------+ ! SSV                                 ! Partial   !Yes            ! None      ! +------------------------------------+----------+---------------+----------+ Left Doppler Measurements +---------------------------+------+------+--------------------------------+ ! Location                   ! Signal!Reflux! Reflux (msec)                   ! +---------------------------+------+------+--------------------------------+ ! Common Femoral             !Phasic!      !                                ! +---------------------------+------+------+--------------------------------+ ! Prox Femoral               !Phasic!      !                                ! +---------------------------+------+------+--------------------------------+ ! Popliteal                  !Phasic!      ! ! +---------------------------+------+------+--------------------------------+      EKG: QTc noted to be over 700 ms and last EKG on file    DIAGNOSIS:       MDD recurrent severe  Alcohol use disorder  PTSD        RISK ASSESSMENT: low risk of suicide or harm to others        RECOMMENDATIONS    Risk Management:  close watch    Medications:     Cymbalta increased to 60 mg daily     we will sign off  Discussed with the treating physician/ team about the patient and treatment plan  Reviewed the chart    Discussed with the patient risk, benefit, alternative and common side effects for the  proposed medication treatment. Patient is consenting to the treatment. Thanks for the consult. Please call me if needed. Electronically signed by Humberto Pisano MD on 10/20/2020 at 5:33 PM    Please note that this chart was generated using voice recognition Dragon dictation software. Although every effort was made to ensure the accuracy of this automated transcription, some errors in transcription may have occurred.

## 2020-10-20 NOTE — PLAN OF CARE
Problem: Falls - Risk of:  Goal: Will remain free from falls  Description: Will remain free from falls  10/20/2020 0048 by Garrett Ambrosio RN  Outcome: Ongoing  10/19/2020 1846 by Michael Arrieta RN  Outcome: Ongoing  Goal: Absence of physical injury  Description: Absence of physical injury  10/20/2020 0048 by Garrett Ambrosio RN  Outcome: Ongoing  10/19/2020 1846 by Michael Arrieta RN  Outcome: Ongoing     Problem: Pain:  Goal: Pain level will decrease  Description: Pain level will decrease  10/20/2020 0048 by Garrett Ambrosio RN  Outcome: Ongoing  10/19/2020 1846 by Michael Arrieta RN  Outcome: Ongoing  Goal: Control of acute pain  Description: Control of acute pain  10/20/2020 0048 by Garrett Ambrosio RN  Outcome: Ongoing  10/19/2020 1846 by Michael Arrieta RN  Outcome: Ongoing  Goal: Control of chronic pain  Description: Control of chronic pain  10/20/2020 0048 by Garrett Ambrosio RN  Outcome: Ongoing  10/19/2020 1846 by Michael Arrieta RN  Outcome: Ongoing     Problem: Breathing Pattern - Ineffective:  Goal: Ability to achieve and maintain a regular respiratory rate will improve  Description: Ability to achieve and maintain a regular respiratory rate will improve  10/20/2020 0048 by Garrett Ambrosio RN  Outcome: Ongoing  10/19/2020 1846 by Michael Arrieta RN  Outcome: Ongoing     Problem: Gas Exchange - Impaired:  Goal: Levels of oxygenation will improve  Description: Levels of oxygenation will improve  10/20/2020 0048 by Garrett Ambrosio RN  Outcome: Ongoing  10/19/2020 1846 by Michael Arrieta RN  Outcome: Ongoing     Problem: Skin Integrity:  Goal: Will show no infection signs and symptoms  Description: Will show no infection signs and symptoms  10/20/2020 0048 by Garrett Ambrosio RN  Outcome: Ongoing  10/19/2020 1846 by Michael Arrieta RN  Outcome: Ongoing  Goal: Absence of new skin breakdown  Description: Absence of new skin breakdown  10/20/2020 0048 by Garrett Ambrosio RN  Outcome: Ongoing  10/19/2020 1846 by Rox Johnson Olesya Fuentes RN  Outcome: Ongoing     Problem: Nutrition  Goal: Optimal nutrition therapy  Description: Nutrition Problem #1: Inadequate oral intake  Intervention: Food and/or Nutrient Delivery: Continue NPO(Start diet as able; recommend ensure enlive supplements TID as able)  Nutritional Goals: Restart diet within 24-72 hrs     10/20/2020 0048 by Garrett Ambrosio RN  Outcome: Ongoing  10/19/2020 1846 by Michael Arrieta RN  Outcome: Ongoing
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
## 2020-10-20 NOTE — PROGRESS NOTES
[facility]    Office: [address], DO, [facility] Nallely, DO, Slim Medellin, DO, Radhaferny Paulino Blood, DO, Candice Qureshi MD, Nazanin Garber MD, Mary Sheth MD, Becky Wang MD, Fer Moss MD, David Zimmer MD, Tomasa Chan MD, Ferny Rosen MD, Mbonu Holli Arrieta MD, Tennille Loco, DO, Lina Aponte MD, Lyndsey Baker MD, Carlton Hutton, DO, Chirag Patten MD,  Marleni Davenport, DO, Patricia Nelson MD, Brad Gan MD, Candido Salomon [facility], [facility], CNP, Demond Haynes, CNP, Deanne Lipscomb, CNS, Susanne Quach, CNP, Phuc Thompson, CNP, Mark Blanco, CNP, Elise Quiñonez, CNP, Rubén Monique, CNP, Taylor Hendricks PA-C, Laurie Thornton, [facility], Simón Boswell, CNP, Sukumar Weller, CNP, Jase Monsivais, CNP, Smiley Escalona, CNP, Jennifer House, [address]    Progress Note    10/20/2020    5:55 PM    Name:   Leighton Birmingham  MRN:     [id number]     [place]:      [de-identified]   Room:   2003/2003-01   Day:  8  Admit Date:  10/12/2020  7:48 PM    PCP:   Emelina Ernst MD  Code Status:  Full Code    Subjective:     C/C:   Chief Complaint   Patient presents with    Shortness of Breath       Interval History Status:    Still quite weak  Has been n.p.o. for testing  Reporting chronic diarrhea    Data Base Updates:  WBC 9.1 k/uL RBC 2.66Low m/uL Hemoglobin 8.5Low g/dL Hematocrit 29.7Low %   .7High     Calcium 7.7Low mg/dL   Sodium 144 mmol/L   Potassium 3.3Low   Magnesium 1.4    Sed rate 53  C-reactive protein 97.7      Brief History:     As documented in the medical record:  \"45year-old brought in by family after being noted to be hypoxic at her PCPs office. Patient does complain of productive cough, bilateral lower extremity painx 2-3 months but worse in the last month. Chronic unchanged shortness of breath going on for more than a year. Has been using albuterol inhaler as needed. Continues to smoke about 1 pack/day. follow-up in 3-6 months is suggested. 2.  Mild peripancreatic edema suggestive of mild acute pancreatitis. 3.  Pleural effusions and dependent airspace disease, favoring atelectasis. 4.  Bilateral mild hydroureteronephrosis to the level of the bladder, which   is distended. This may be due to a functional versus mechanical bladder   outlet obstruction and resulting hydronephrosis. Punctate left   nephrolithiasis is noted without stone in either ureter or the bladder. 5.  Findings consistent with hepatic steatosis. 6.  Mild edematous appearance of the distal sigmoid and rectum, which may be   accentuated by underdistention. Possibility of colitis should be considered. 7.  Chronic appearing T11 compression deformity. Medications:      Allergies:  No Known Allergies    Current Meds:   Scheduled Meds:    [START ON 10/21/2020] DULoxetine  60 mg Oral Daily    sodium chloride  30 mL Intravenous Once    potassium chloride  40 mEq Oral Daily    amoxicillin-clavulanate  1 tablet Oral 3 times per day    predniSONE  40 mg Oral Daily    chlordiazePOXIDE  10 mg Oral TID    ipratropium-albuterol  1 ampule Inhalation Q4H WA    amLODIPine  5 mg Oral Daily    busPIRone  15 mg Oral Q6H    carBAMazepine  200 mg Oral TID    ferrous sulfate  325 mg Oral BID    folic acid  1 mg Oral Daily    gabapentin  200 mg Oral TID    hydrOXYzine  50 mg Oral TID    magnesium carbonate  54 mg Oral TID    propranolol  40 mg Oral BID    rOPINIRole  1 mg Oral Nightly    traZODone  100 mg Oral Nightly    vitamin B-1  100 mg Oral Daily    sodium chloride flush  10 mL Intravenous 2 times per day    famotidine  20 mg Oral BID    [Held by provider] enoxaparin  40 mg Subcutaneous Daily    multivitamin  1 tablet Oral Daily    budesonide-formoterol  2 puff Inhalation BID     Continuous Infusions:    sodium chloride 100 mL/hr at 10/20/20 0300     PRN Meds: potassium chloride **OR** potassium alternative oral replacement **OR** potassium chloride, magnesium sulfate, nicotine polacrilex, nicotine polacrilex, sodium chloride flush, oxyCODONE-acetaminophen, LORazepam, sodium chloride flush, acetaminophen **OR** [DISCONTINUED] acetaminophen, polyethylene glycol, promethazine **OR** ondansetron, nicotine, albuterol    Data:     Past Medical History:   has a past medical history of Closed fracture of lateral portion of left tibial plateau, COPD (chronic obstructive pulmonary disease) ([facility] [address] Depression, Hypertension, and Pain, joint, ankle and foot. Social History:   reports that she has been smoking cigarettes. She has a 30.00 pack-year smoking history. She has never used smokeless tobacco. She reports current alcohol use. She reports current drug use. Drug: Marijuana. Family History:   Family History   Problem Relation Age of Onset    Other Father     Cancer Maternal Grandmother     Heart Disease Paternal Grandmother        Review of Systems:     Review of Systems   Constitutional: Positive for activity change (Decreased), appetite change (Diminished) and fatigue. Negative for chills, diaphoresis and fever. Respiratory: Negative for cough and shortness of breath. Cardiovascular: Negative for chest pain and palpitations. Gastrointestinal: Positive for diarrhea (Diarrhea). Negative for abdominal pain, nausea and vomiting. Genitourinary: Negative for flank pain and hematuria. Skin: Positive for wound (Buttock wound reported). Neurological: Positive for weakness. Physical Examination:        Physical Exam  Vitals signs reviewed. Constitutional:       General: She is not in acute distress. Appearance: She is ill-appearing. She is not diaphoretic. HENT:      Head: Normocephalic. Nose: Nose normal.   Eyes:      General: No scleral icterus. Conjunctiva/sclera: Conjunctivae normal.   Neck:      Musculoskeletal: Neck supple. Trachea: No tracheal deviation.    Cardiovascular:      Rate and Rhythm: Normal rate and regular rhythm. Pulmonary:      Effort: Pulmonary effort is normal. No respiratory distress. Breath sounds: Normal breath sounds. No wheezing or rales. Chest:      Chest wall: No tenderness. Abdominal:      General: Bowel sounds are normal. There is no distension. Palpations: Abdomen is soft. Tenderness: There is no abdominal tenderness. Musculoskeletal:         General: No tenderness. Skin:     General: Skin is warm and dry. Neurological:      Comments: Somnolent  Having some difficulty with historical data         Vitals:  BP (!) 137/90   Pulse 67   Temp 96.6 °F (35.9 °C) (Oral)   Resp 22   Ht 5' 5\" (1.651 m)   Wt 126 lb 12.8 oz (57.5 kg)   SpO2 99%   BMI 21.10 kg/m²   Temp (24hrs), Av °F (36.7 °C), Min:96.6 °F (35.9 °C), Max:98.6 °F (37 °C)    No results for input(s): POCGLU in the last 72 hours. I/O (24Hr): Intake/Output Summary (Last 24 hours) at 10/20/2020 1755  Last data filed at 10/20/2020 0649  Gross per 24 hour   Intake 1200 ml   Output --   Net 1200 ml       Labs:  Hematology:  Recent Labs     10/18/20  0716  10/19/20  0729  10/20/20  0533 10/20/20  0858 10/20/20  1538   WBC 10.2  --  9.2  --  9.1  --   --    RBC 2.53*  --  2.53*  --  2.66*  --   --    HGB 8.1*   < > 8.4*  8.3*   < > 8.5* 8.6* 8.7*   HCT 27.2*   < > 27.3*  27.8*   < > 29.7* 27.1* 29.3*   .5*  --  109.9*  --  111.7*  --   --    MCH 32.0  --  32.8  --  32.0  --   --    MCHC 29.8  --  29.9  --  28.6  --   --    RDW 16.8*  --  17.0*  --  16.8*  --   --      --  344  --  405  --   --    MPV 11.8  --  11.1  --  10.8  --   --    SEDRATE  --   --   --   --  53*  --   --    CRP  --   --   --   --  97.7*  --   --     < > = values in this interval not displayed.      Chemistry:  Recent Labs     10/19/20  0121 10/19/20  0729 10/20/20  0533 10/20/20  0858    141 144  --    K 3.3* 3.0* 3.3*  --     106 112*  --    CO2 25 24 23  --    GLUCOSE 132* 100* 84 --    BUN 9 9 8  --    CREATININE 0.22* <0.20* 0.25*  --    MG 1.5* 1.3* 1.4* 1.4*   ANIONGAP 10 11 9  --    LABGLOM >60 CANNOT BE CALCULATED >60  --    GFRAA >60 CANNOT BE CALCULATED >60  --    CALCIUM 7.8* 7.9* 7.7*  --      Recent Labs     10/18/20  0716 10/19/20  0729 10/20/20  0533   PROT 5.1* 5.1* 5.1*   LABALBU 1.9* 2.0* 2.0*   AST 38* 35* 74*   ALT 13 13 19   ALKPHOS 214* 215* 247*   BILITOT 0.61 0.51 0.46     ABG:  Lab Results   Component Value Date    POCPH 7.452 10/16/2020    POCPCO2 37.0 10/16/2020    POCPO2 80.7 10/16/2020    POCHCO3 25.9 10/16/2020    NBEA NOT REPORTED 10/16/2020    PBEA 2 10/16/2020    YWP6ADU 27 10/16/2020    JDHS4YCI 96 10/16/2020    FIO2 55.0 10/16/2020     Lab Results   Component Value Date/Time    SPECIAL NOT REPORTED 10/13/2020 08:27 AM     Lab Results   Component Value Date/Time    CULTURE NORMAL RESPIRATORY SUSIE MODERATE GROWTH 10/13/2020 08:27 AM       Radiology:  Ct Abdomen Pelvis W Iv Contrast Additional Contrast? Oral    Result Date: 10/20/2020  1. Pancreatic head cyst is demonstrated, which may represent a pseudocyst or possibly side branch IPMN. No suspicious features were demonstrated within this lesion on recent MRI. This finding may resulting compressive affects on the adjacent bile duct, as described on recent MRI. As such, short-term imaging follow-up in 3-6 months is suggested. 2.  Mild peripancreatic edema suggestive of mild acute pancreatitis. 3.  Pleural effusions and dependent airspace disease, favoring atelectasis. 4.  Bilateral mild hydroureteronephrosis to the level of the bladder, which is distended. This may be due to a functional versus mechanical bladder outlet obstruction and resulting hydronephrosis. Punctate left nephrolithiasis is noted without stone in either ureter or the bladder. 5.  Findings consistent with hepatic steatosis. 6.  Mild edematous appearance of the distal sigmoid and rectum, which may be accentuated by underdistention. Possibility of colitis should be considered. 7.  Chronic appearing T11 compression deformity. Us Liver    Result Date: 10/19/2020  *Fatty infiltration of the liver. *Dilatation of the CBD measuring 11.3 mm. Gallbladder appears unremarkable. In addition, there appears to be a cystic lesion involving the head of the pancreas measuring 2.4 x 2.2 x 1.5 cm. Further evaluation with MRI/MRCP with and without IV contrast is recommended. Differential considerations includes small pseudocyst, duodenal diverticulum or possibly pancreatic cystic neoplasm. Findings were not present on the 07/15/2019 study. Xr Chest Portable    Result Date: 10/17/2020  Mild interval improvement in scattered pulmonary opacities. Decrease in size of pleural effusions, now minimal.     Xr Chest Portable    Result Date: 10/16/2020  Small bilateral pleural effusions. Worsening right lower lobe infiltrate and possibly new left lower lobe infiltrate. Xr Chest Portable    Result Date: 10/14/2020  Small right pleural effusion. Linear opacity in the right lung base, atelectasis versus pneumonia. Mri Abdomen W Wo Contrast Mrcp    Result Date: 10/19/2020  1. Cystic lesion at the pancreas may reflect periampullary duodenal diverticulum or pancreatic pseudocyst.  Correlative CT abdomen with IV and oral contrast recommended. 2. A number of the MR series are degraded by breathing motion, blurring detail. 3. Nonspecific dilated common bile duct without stricture or stone evident. This appears to drain at the possible duodenal diverticulum, or may be mildly dilated because of extrinsic compression by pancreatic pseudocyst. 4. Hepatic steatosis and mild hepatomegaly. 5. Small volume bilateral pleural effusion with bibasilar consolidation. Pneumonia is a consideration.  RECOMMENDATIONS: CT abdomen with IV and oral contrast         Assessment:        Principal Problem:    COPD exacerbation (HCC)  Active Problems:    Essential hypertension Depression    Hypokalemia    Macrocytic anemia    Hypomagnesemia    Ambulatory dysfunction    Seizures (HCC)    Alcohol withdrawal syndrome without complication (HCC)    Paresthesia of lower extremity    Acute respiratory failure with hypoxia (HCC)    Pancreatic cyst    Recurrent major depressive disorder (HCC)    Elevated CA 19-9 level  Resolved Problems:    * No resolved hospital problems.  *      Plan:        Respiratory Therapy and Bronchodilators prn  Pulmonary evaluation  Observe for further DTs  Thiamine  Ativan  Psych evaluation noted  Anemia w/u on outpatient basis is suggested    Correct electrolyte abnormalities  Physical therapy and rehabilitation    Seizure precautions  GI evaluation and follow-up  Check C. difficile: Diarrhea  Wound care: Buttock wound    IP CONSULT TO HOSPITALIST  IP CONSULT TO SOCIAL WORK  IP CONSULT TO PSYCHIATRY  IP CONSULT TO PULMONOLOGY  IP CONSULT TO SPIRITUAL SERVICES  IP CONSULT TO GI  IP CONSULT TO Yusuf Ordoñez DO  10/20/2020  5:55 PM

## 2020-10-20 NOTE — FLOWSHEET NOTE
The patients mother Called the Spiritual Care Office. The patients mother had questions about Durable Power of Attorneys (NON- medical POA). She also asked about if the hospital had notaries. The  informed her that we only do Medical POA paperwork and that the Hospital does not have notaries here.      The patients mother Tacy Severance [phone])        10/19/20 2121   Encounter Summary   Services provided to: Family   Referral/Consult From: Family   Support System Parent   Place of Rastafarian None   Continue Visiting   (10/19/20)   Complexity of Encounter Moderate   Length of Encounter 15 minutes   Spiritual Assessment Completed Yes

## 2020-10-20 NOTE — TELEPHONE ENCOUNTER
Mother informed, Barbara's sister is an  and she is working with Probate court to get situation handled.   They will drop off paperwork

## 2020-10-20 NOTE — PLAN OF CARE
Problem: Falls - Risk of:  Goal: Will remain free from falls  Description: Will remain free from falls  10/20/2020 1252 by Angela Krishnan RN  Outcome: Ongoing  10/20/2020 0048 by Ritu Ruiz RN  Outcome: Ongoing  Goal: Absence of physical injury  Description: Absence of physical injury  10/20/2020 1252 by Angela Krishnan RN  Outcome: Ongoing  10/20/2020 0048 by Ritu Ruiz RN  Outcome: Ongoing     Problem: Pain:  Description: Pain management should include both nonpharmacologic and pharmacologic interventions.   Goal: Pain level will decrease  Description: Pain level will decrease  10/20/2020 1252 by Angela Krishnan RN  Outcome: Ongoing  10/20/2020 0048 by Ritu Ruiz RN  Outcome: Ongoing  Goal: Control of acute pain  Description: Control of acute pain  10/20/2020 1252 by Angela Krishnan RN  Outcome: Ongoing  10/20/2020 0048 by Ritu Ruiz RN  Outcome: Ongoing  Goal: Control of chronic pain  Description: Control of chronic pain  10/20/2020 1252 by Angela Krishnan RN  Outcome: Ongoing  10/20/2020 0048 by Ritu Ruiz RN  Outcome: Ongoing     Problem: Nutrition  Goal: Optimal nutrition therapy  Description: Nutrition Problem #1: Inadequate oral intake  Intervention: Food and/or Nutrient Delivery: Continue NPO(Start diet as able; recommend ensure enlive supplements TID as able)  Nutritional Goals: Restart diet within 24-72 hrs     10/20/2020 1252 by Angela Krishnan RN  Outcome: Ongoing  10/20/2020 0048 by Ritu Ruiz RN  Outcome: Ongoing     Problem: Breathing Pattern - Ineffective:  Goal: Ability to achieve and maintain a regular respiratory rate will improve  Description: Ability to achieve and maintain a regular respiratory rate will improve  10/20/2020 1252 by Angela Krishnan RN  Outcome: Ongoing  10/20/2020 0048 by Ritu Ruiz RN  Outcome: Ongoing     Problem: Gas Exchange - Impaired:  Goal: Levels of oxygenation will improve  Description: Levels of oxygenation will improve  10/20/2020 1252 by Laurence Mark RN  Outcome: Ongoing  10/20/2020 0048 by Mary Thrasher RN  Outcome: Ongoing     Problem: Skin Integrity:  Goal: Will show no infection signs and symptoms  Description: Will show no infection signs and symptoms  10/20/2020 1252 by Laurence Mark RN  Outcome: Ongoing  10/20/2020 0048 by Mary Thrasher RN  Outcome: Ongoing  Goal: Absence of new skin breakdown  Description: Absence of new skin breakdown  10/20/2020 1252 by Laurence Mark RN  Outcome: Ongoing  10/20/2020 0048 by Mary Thrasher RN  Outcome: Ongoing

## 2020-10-20 NOTE — PROGRESS NOTES
THE [facility] Progress Note    Tomasz Mixon is a 45 y.o. female patient. Hospitalization Day:8      Chief consult reason: GI bleed      Subjective: Patient seen and examined. No acute events overnight. Patient has been n.p.o. following testing yesterday. Patient is requesting to be discharged home. Patient reports she will not go to alcohol rehab due to prior experiences. She plans to \"wean down\" alcohol. VITALS:  /72   Pulse 71   Temp 98.3 °F (36.8 °C) (Oral)   Resp 22   Ht 5' 5\" (1.651 m)   Wt 126 lb 12.8 oz (57.5 kg)   SpO2 98%   BMI 21.10 kg/m²   TEMPERATURE:  Current - Temp: 98.3 °F (36.8 °C); Max - Temp  Av.5 °F (36.9 °C)  Min: 98.3 °F (36.8 °C)  Max: 98.8 °F (37.1 °C)    Physical Assessment:  General appearance:  alert, cooperative and no distress. Mental Status:  oriented to person, place and time. Mild alcohol withdrawal tremor  Lungs: Decreased aeration bilaterally, normal effort  Heart:  regular rate and rhythm, no murmur  Abdomen:  soft, nontender, nondistended, normal bowel sounds, no masses  Extremities:  no edema, no redness, No clubbing  Skin:  warm, dry, no gross lesions or rashes    Data Review:  LABS and IMAGING:     CBC  Recent Labs     10/18/20  0716  10/19/20  0729 10/19/20  1223 10/19/20  1853 10/20/20  0235 10/20/20  0533   WBC 10.2  --  9.2  --   --   --  9.1   HGB 8.1*   < > 8.4*  8.3* 8.3* 7.9* 8.4* 8.5*   HCT 27.2*   < > 27.3*  27.8* 27.0* 26.4* 28.7* 29.7*   .5*  --  109.9*  --   --   --  111.7*   MCHC 29.8  --  29.9  --   --   --  28.6   RDW 16.8*  --  17.0*  --   --   --  16.8*     --  344  --   --   --  405    < > = values in this interval not displayed. Immature PLTs   Lab Results   Component Value Date    PLTFLUORE 129 07/10/2020       ANEMIA STUDIES  No results for input(s): LABIRON, TIBC, IRON, FERRITIN, [id number], FOLATE, OCCULTBLD in the last 72 hours.     BMP  Recent Labs     10/19/20  0121 fatty liver and hepatomegaly TECHNOLOGIST PROVIDED HISTORY: hx of fatty liver and hepatomegaly FINDINGS: LIVER:  Diffuse fatty infiltration. No focal mass or intrahepatic bile duct dilatation. Hepatopetal flow seen within the portal vein. BILIARY SYSTEM:  Gallbladder is unremarkable without evidence of pericholecystic fluid, wall thickening or stones. Negative sonographic Choi's sign. Common bile duct is dilated measuring 11.3 mm. RIGHT KIDNEY: The right kidney is grossly unremarkable without evidence of hydronephrosis. PANCREAS:  Cystic lesion involving the head of the pancreas measuring 2.4 x 2.2 x 1.5 cm. OTHER: No evidence of right upper quadrant ascites. *Fatty infiltration of the liver. *Dilatation of the CBD measuring 11.3 mm. Gallbladder appears unremarkable. In addition, there appears to be a cystic lesion involving the head of the pancreas measuring 2.4 x 2.2 x 1.5 cm. Further evaluation with MRI/MRCP with and without IV contrast is recommended. Differential considerations includes small pseudocyst, duodenal diverticulum or possibly pancreatic cystic neoplasm. Findings were not present on the 07/15/2019 study. Xr Chest Portable    Result Date: 10/17/2020  EXAMINATION: ONE XRAY VIEW OF THE CHEST 10/17/2020 9:42 am COMPARISON: 16 October 2020 HISTORY: ORDERING SYSTEM PROVIDED HISTORY: f/u TECHNOLOGIST PROVIDED HISTORY: f/u FINDINGS: AP portable view of the chest time stamped at 913 hours demonstrates overlying cardiac monitoring electrodes. The patient has bilateral effusions, diminished from prior study. Mild interval improvement in scattered pulmonary opacities is noted. No extrapleural air. Heart size is normal.     Mild interval improvement in scattered pulmonary opacities.   Decrease in size of pleural effusions, now minimal.       Mri Abdomen W Wo Contrast Mrcp    Result Date: 10/19/2020  EXAMINATION: MRI OF THE ABDOMEN WITH AND WITHOUT CONTRAST AND MRCP 10/19/2020 5:05 pm TECHNIQUE: Multiplanar multisequence MRI of the abdomen was performed with and without the administration of intravenous contrast.  After initial T2 axial and coronal images, thick slab, thin slab and 3D coronal MRCP sequences were obtained without the administration of intravenous contrast.  MIP images are provided for review. COMPARISON: ultrasound liver performed earlier today HISTORY: ORDERING SYSTEM PROVIDED HISTORY: Abnormal LFTs, pancreatic lesion on Liver US, Dilated CBD TECHNOLOGIST PROVIDED HISTORY: Abnormal LFTs, pancreatic lesion of Liver US, Dilated CBD Is the patient pregnant?-> no Reason for Exam: Abnormal LFTs, pancreatic lesion on Liver US, Dilated CBD FINDINGS: TECHNICAL: A number of the MR series are degraded by breathing motion blurring detail. PANCREAS: Cystic area adjacent to or involving the pancreatic head corresponds to that seen sonographically, 22 x 26 mm axial series 8, image 28. No discrete enhancement is evident following contrast administration. No restricted diffusion. Other portions of the pancreas have an unremarkable appearance. GALLBLADDER: Unremarkable in appearance. OTHER ORGANS: The spleen and adrenal glands appear unremarkable. The liver is fatty. No discrete hepatic lesion is evident. Craniocaudal liver measurement 18 cm. The kidneys appear unremarkable. MRCP: The common bile duct measures 10 mm, no stone or stricture. The pancreatic duct measures 2-3 mm, unremarkable appearance. PERITONEUM/RETROPERITONEUM: Unremarkable appearance of the aorta aorta. No aneurysm. Unremarkable appearance of the IVC. No adenopathy or fluid. BONES/SOFT TISSUES: Unremarkable appearance. LOWER CHEST: Small volume bilateral pleural effusion with bibasilar consolidation. 1. Cystic lesion at the pancreas may reflect periampullary duodenal diverticulum or pancreatic pseudocyst.  Correlative CT abdomen with IV and oral contrast recommended.  2. A number of the MR series are degraded by breathing motion, blurring detail. 3. Nonspecific dilated common bile duct without stricture or stone evident. This appears to drain at the possible duodenal diverticulum, or may be mildly dilated because of extrinsic compression by pancreatic pseudocyst. 4. Hepatic steatosis and mild hepatomegaly. 5. Small volume bilateral pleural effusion with bibasilar consolidation. Pneumonia is a consideration. RECOMMENDATIONS: CT abdomen with IV and oral contrast         ENDOSCOPY     Principal Problem:    COPD exacerbation (HCC)  Active Problems:    Essential hypertension    Depression    Alcohol withdrawal syndrome without complication (HCC)    Paresthesia of lower extremity    Acute respiratory failure with hypoxia (HCC)    Pancreatic cyst  Resolved Problems:    * No resolved hospital problems. *       GI Assessment:    1. Anemia with occult positive stool  - No overt signs of GI bleeding. Hgb improved. Will need eventual EGD/colonoscopy to R/O sources such as  Esophagitis/gastritic, peptic ulcer disease, AVMs or malignancy       2. Alcohol use disorder -consuming 1 pint of rum daily.  - Having Alcohol withdrawal tremor      3. COPD exacerbation - Current Tobacco use   - On BiPap PRN     4. HX of hepatomegaly with fatty liver     5. Elevated LFTs - Primarily alk phos      6. Abnormal US liver - Dilated CBD with cystic lesion of pancreatic head     7. Abnormal MRI/MRCP - Pancreatic lesion - ? Pancreatic pseudocyst versus periepilator duodenal diverticulum. - Ct A/P recommended and ordered   CA 19-9: 123      Plan of care:  1. CT A/P to further assess pancreatic lesion (as recommended on MRI/MRCP). 2. Advance diet  3. Strongly recommend alcohol cessation-discussed with patient. Patient has no plans for alcohol rehab at this time, but plans to [place] down\"   4. Patient Will need eventual EGD/colonoscopy to R/O sources such as  Esophagitis/gastritic, peptic ulcer disease, AVMs or malignancy    5. Trend CBC  6.  Continue Pepcid 20 mg BID    Please feel free to contact me with any questions or concerns. Thank you for allowing me to participate in the care of your patient. LOI Gill CNP on 10/20/2020 at 8:49 AM   THE [facility]    Please note that this note was generated using a voice recognition dictation software. Although every effort was made to ensure the accuracy of this automated transcription, some errors in transcription may have occurred.

## 2020-10-20 NOTE — PROGRESS NOTES
Physical Therapy  DATE: 10/20/2020    NAME: Audrey Irizarry  MRN: [id number]   : 1970    Patient not seen this date for Physical Therapy due to:  [] Blood transfusion in progress  [] Hemodialysis  [x]  Patient Declined  -  Pt reports having diarrhea. Pt requested to be checked on tomorrow. RN informed. [] Spine Precautions   [] Strict Bedrest  [] Surgery/ Procedure  [] Testing      [] Other        [] PT being discontinued at this time. Patient independent. No further needs. [] PT being discontinued at this time as the patient has been transferred to palliative care. No further needs.     Dorene Watts, PTA

## 2020-10-20 NOTE — PLAN OF CARE
[facility] ([facility] [address] [J44.1] . Vitals:    10/20/20 0745   BP:    Pulse: 71   Resp: 22   Temp:    SpO2: 98%   . Patients home meds are   Prior to Admission medications    Medication Sig Start Date End Date Taking?  Authorizing Provider   tiotropium (SPIRIVA RESPIMAT) 1.25 MCG/ACT AERS inhaler Inhale 2 puffs into the lungs daily 10/12/20   Ludivina Gee MD   tiZANidine (ZANAFLEX) 4 MG tablet Take 1 tablet by mouth every 8 hours as needed (back pain) 10/12/20   Ludivina Gee MD   potassium chloride (KLOR-CON M) 20 MEQ extended release tablet Take 1 tablet by mouth daily 9/10/20   Ermalinda Shock, APRN - CNP   magnesium carbonate (MAGONATE) 54 (Mag Equiv) MG/5ML LIQD oral liquid Take 5 mLs by mouth 3 times daily 7/16/20   Ludivina Gee MD   ferrous sulfate (IRON 325) 325 (65 Fe) MG tablet Take 1 tablet by mouth 2 times daily 7/16/20   Ludivina Gee MD   rOPINIRole (REQUIP) 1 MG tablet Take 1 tablet by mouth nightly 7/8/20   Ludivina Gee MD   ACETAMINOPHEN EXTRA STRENGTH 500 MG tablet Take 500 mg by mouth 3 times daily as needed 5/8/20   Historical Provider, MD   ibuprofen (ADVIL;MOTRIN) 800 MG tablet Take 800 mg by mouth 3 times daily as needed 5/15/20   Historical Provider, MD   propranolol (INDERAL) 40 MG tablet Take 40 mg by mouth 2 times daily 5/12/20   Historical Provider, MD   albuterol sulfate HFA (VENTOLIN HFA) 108 (90 Base) MCG/ACT inhaler Inhale 2 puffs into the lungs 4 times daily as needed for Wheezing 6/11/20   Ludivina Gee MD   busPIRone (BUSPAR) 15 MG tablet Take 15 mg by mouth every 6 hours 6/11/20   Ludivina Gee MD   escitalopram (LEXAPRO) 20 MG tablet Take 1.5 tablets by mouth daily 6/11/20   Ludivina Gee MD   traZODone (DESYREL) 100 MG tablet Take 1 tablet by mouth nightly 6/11/20   Ludivina Gee MD   carBAMazepine (TEGRETOL XR) 200 MG extended release tablet Take 1 tablet by mouth 3 times daily 6/11/20   Ludivina Baxter MD   amLODIPine (NORVASC) 5 MG tablet Take 1 tablet by mouth daily 6/11/20   Ludivina Baxter MD   gabapentin (NEURONTIN) 100 MG capsule Take 2 capsules by mouth 3 times daily for 180 days.  Intended supply: 90 days 6/11/20 12/8/20  Ludivina Baxter MD   hydrOXYzine (ATARAX) 50 MG tablet Take 1 tablet by mouth 3 times daily 6/11/20   Ludivina Baxter MD   acamprosate (CAMPRAL) 333 MG tablet Take 2 tablets by mouth 3 times daily 12/10/19 10/12/20  LOI Castillo NP   vitamin B-1 (THIAMINE) 100 MG tablet Take 1 tablet by mouth daily 7/12/19   Ludivina Baxter MD   vitamin D (ERGOCALCIFEROL) 81342 units CAPS capsule Take 1 capsule by mouth once a week 6/19/19   Ludivina Baxter MD   folic acid (FOLVITE) 1 MG tablet Take 1 tablet by mouth daily 6/19/19   Ludivina Baxter MD   .  Recent Surgical History: None = 0     Assessment     Peak Flow (asthma only)    Predicted: 359  Personal Best: NA  PEF   % Predicted   Peak Flow :     FEV1/FVC    FEV1 Predicted 2.85      FEV1 NA    FEV1 % Predicted   FVC   IS volume   IBW 57 kg    RR 22  Breath Sounds: rhonchi       Bronchodilator assessment at level  3  Hyperinflation assessment at level   Secretion Management assessment at level      [x]    Bronchodilator Assessment  BRONCHODILATOR ASSESSMENT SCORE  Score 0 1 2 3 4 5   Breath Sounds   []  Patient Baseline [x]  No Wheeze good aeration []  Faint, scattered wheezing, good aeration []  Expiratory Wheezing and or moderately diminished []  Insp/Exp wheeze and/or very diminished []  Insp/Exp and/ or marked distress   Respiratory Rate   []  Patient Baseline []  Less than 20 []  Less than 20 [x]  20-25 []  Greater than 25 []  Greater than 25   Peak flow % of Pred or PB []  NA   []  Greater than 90%  []  81-90% []  71-80% []  Less than or equal to 70%  or unable to perform []  Unable due to Respiratory Distress   Dyspnea re []  Patient Baseline []  No SOB []  No SOB [x]  SOB on exertion []  SOB min activity []  At rest/acute   e FEV% Predicted       []  NA []  Above 69%  []  Unable []  Above 60-69%  []  Unable []  Above 50-59%  []  Unable []  Above 35-49%  []  Unable []  Less than 35%  []  Unable                 []  Hyperinflation Assessment  Score 1 2 3   CXR and Breath Sounds   []  Clear []  No atelectasis  Basilar aeration []  Atelectasis or absent basilar breath sounds   Incentive Spirometry Volume  (Per IBW)   []  Greater than or equal to 15ml/Kg []  less than 15ml/Kg []  less than 15ml/Kg   Surgery within last 2 weeks []  None or general   []  Abdominal or thoracic surgery  []  Abdominal or thoracic   Chronic Pulmonary Historyre []  No []  Yes []  Yes     []  Secretion Management Assessment  Score 1 2 3   Bilateral Breath Sounds   []  Occasional Rhonchi []  Scattered Rhonchi []  Course Rhonchi and/or poor aeration   Sputum    []  Small amount of thin secretions []  Moderate amount of viscous secretions []  Copius, Viscious Yellow/ Secretions   CXR as reported by physician []  clear  []  Unavailable []  Infiltrates and/or consolidation  []  Unavailable []  Mucus Plugging and or lobar consolidation  []  Unavailable   Cough []  Strong, productive cough []  Weak productive cough []  No cough or weak non-productive cough   LEAH RANGEL  7:55 AM                            FEMALE                                  MALE                            FEV1 Predicted Normal Values                        FEV1 Predicted Normal Values          Age                                     Height in Feet and Inches       Age                                     Height in Feet and Inches       4' 11\" 5' 1\" 5' 3\" 5' 5\" 5' 7\" 5' 9\" 5' 11\" 6' 1\"  4' 11\" 5' 1\" 5' 3\" 5' 5\" 5' 7\" 5' 9\" 5' 11\" 6' 1\"   42 - 45 2.49 2.66 2.84 3.03 3.22 3.42 3.62 3.83 42 - 45 2.82 3.03 3.26 3.49 3.72 3.96 4.22 4.47   46 - 49 2.40 2.57 2.76 2.94 3.14 3.33 3.54 3.75 46 - 49 2.70 2.92 3.14 3.37 3.61 3.85 4.10 4.36   50 - 53 2.31 2.48 2.66 2. 85 3.04 3.24 3.45 3.66 50 - 53 2.58 2.80 3.02 3.25 3.49 3.73 3.98 4.24   54 - 57 2.21 2.38 2.57 2.75 2.95 3.14 3.35 3.56 54 - 57 2.46 2.67 2.89 3.12 3.36 3.60 3.85 4.11   58 - 61 2.10 2.28 2.46 2.65 2.84 3.04 3.24 3.45 58 - 61 2.32 2.54 2.76 2.99 3.23 3.47 3.72 3.98   62 - 65 1.99 2.17 2.35 2.54 2.73 2.93 3.13 3.34 62 - 65 2.19 2.40 2.62 2.85 3.09 3.33 3.58 3.84   66 - 69 1.88 2.05 2.23 2.42 2.61 2.81 3.02 3.23 66 - 69 2.04 2.26 2.48 2.71 2.95 3.19 3.44 3.70   70+ 1.82 1.99 2.17 2.36 2.55 2.75 2.95 3.16 70+ 1.97 2.19 2.41 2.64 2.87 3.12 3.37 3.62             Predicted Peak Expiratory Flow Rate                                       Height (in)  Female       Height (in) Male           Age 64 63 56 61 58 73 78 74 Age            21 344 357 372 387 402 417 432 446  60 62 64 66 68 70 72 74 76   25 337 352 366 381 396 411 426 441 25 447 476 505 533 562 591 619 648 677   30 329 344 359 374 389 404 419 434 30 437 466 494 523 552 580 609 638 667   35 322 337 351 366 381 396 411 426 35 426 455 484 512 541 570 598 627 657   40 314 329 344 359 374 389 404 419 40 416 445 473 502 531 559 588 617 647   45 307 322 336 351 366 381 396 411 45 405 434 463 491 520 549 577 606 636   50 299 314 329 344 359 374 389 404 50 395 424 452 481 510 538 567 596 625   55 292 307 321 336 351 366 381 396 55 384 413 442 470 499 528 556 585 615   60 284 299 314 329 344 359 374 389 60 374 403 431 460 489 517 546 575 605   65 277 292 306 321 336 351 366 381 65 363 392 421 449 478 507 535 564 594   70 269 284 299 314 329 344 359 374 70 353 382 410 439 468 496 525 554 583   75 261 274 289 305 319 334 348 364 75 344 372 400 429 458 487 515 544 573   80 253 266 282 296 312 327 342 356 80 335 362 390 419 448 [phone] 562

## 2020-10-21 ENCOUNTER — TELEPHONE (OUTPATIENT)
Dept: FAMILY MEDICINE CLINIC | Age: 50
End: 2020-10-21

## 2020-10-21 LAB
ABSOLUTE EOS #: 0.04 K/UL (ref 0–0.44)
ABSOLUTE IMMATURE GRANULOCYTE: 0.04 K/UL (ref 0–0.3)
ABSOLUTE LYMPH #: 1.5 K/UL (ref 1.1–3.7)
ABSOLUTE MONO #: 1.14 K/UL (ref 0.1–1.2)
ALBUMIN SERPL-MCNC: 1.8 G/DL (ref 3.5–5.2)
ALBUMIN/GLOBULIN RATIO: 0.6 (ref 1–2.5)
ALP BLD-CCNC: 245 U/L (ref 35–104)
ALT SERPL-CCNC: 23 U/L (ref 5–33)
AMYLASE: 34 U/L (ref 28–100)
ANION GAP SERPL CALCULATED.3IONS-SCNC: 9 MMOL/L (ref 9–17)
AST SERPL-CCNC: 66 U/L
BASOPHILS # BLD: 0 % (ref 0–2)
BASOPHILS ABSOLUTE: <0.03 K/UL (ref 0–0.2)
BILIRUB SERPL-MCNC: 0.39 MG/DL (ref 0.3–1.2)
BUN BLDV-MCNC: 4 MG/DL (ref 6–20)
BUN/CREAT BLD: ABNORMAL (ref 9–20)
CALCIUM IONIZED: 1.09 MMOL/L (ref 1.13–1.33)
CALCIUM SERPL-MCNC: 7.8 MG/DL (ref 8.6–10.4)
CHLORIDE BLD-SCNC: 113 MMOL/L (ref 98–107)
CO2: 21 MMOL/L (ref 20–31)
CREAT SERPL-MCNC: 0.2 MG/DL (ref 0.5–0.9)
DIFFERENTIAL TYPE: ABNORMAL
EOSINOPHILS RELATIVE PERCENT: 0 % (ref 1–4)
GFR AFRICAN AMERICAN: >60 ML/MIN
GFR NON-AFRICAN AMERICAN: >60 ML/MIN
GFR SERPL CREATININE-BSD FRML MDRD: ABNORMAL ML/MIN/{1.73_M2}
GFR SERPL CREATININE-BSD FRML MDRD: ABNORMAL ML/MIN/{1.73_M2}
GLUCOSE BLD-MCNC: 99 MG/DL (ref 70–99)
HCT VFR BLD CALC: 28.9 % (ref 36.3–47.1)
HCT VFR BLD CALC: 29.4 % (ref 36.3–47.1)
HCT VFR BLD CALC: 29.9 % (ref 36.3–47.1)
HCT VFR BLD CALC: 30.3 % (ref 36.3–47.1)
HEMOGLOBIN: 8.5 G/DL (ref 11.9–15.1)
HEMOGLOBIN: 8.9 G/DL (ref 11.9–15.1)
HEMOGLOBIN: 8.9 G/DL (ref 11.9–15.1)
HEMOGLOBIN: 9 G/DL (ref 11.9–15.1)
IMMATURE GRANULOCYTES: 0 %
LIPASE: 76 U/L (ref 13–60)
LYMPHOCYTES # BLD: 15 % (ref 24–43)
MAGNESIUM: 1.9 MG/DL (ref 1.6–2.6)
MCH RBC QN AUTO: 32.2 PG (ref 25.2–33.5)
MCHC RBC AUTO-ENTMCNC: 29.4 G/DL (ref 28.4–34.8)
MCV RBC AUTO: 109.5 FL (ref 82.6–102.9)
MONOCYTES # BLD: 11 % (ref 3–12)
NRBC AUTOMATED: 0 PER 100 WBC
PDW BLD-RTO: 16.7 % (ref 11.8–14.4)
PLATELET # BLD: 437 K/UL (ref 138–453)
PLATELET ESTIMATE: ABNORMAL
PMV BLD AUTO: 11 FL (ref 8.1–13.5)
POTASSIUM SERPL-SCNC: 3.3 MMOL/L (ref 3.7–5.3)
RBC # BLD: 2.64 M/UL (ref 3.95–5.11)
RBC # BLD: ABNORMAL 10*6/UL
SEG NEUTROPHILS: 74 % (ref 36–65)
SEGMENTED NEUTROPHILS ABSOLUTE COUNT: 7.55 K/UL (ref 1.5–8.1)
SODIUM BLD-SCNC: 143 MMOL/L (ref 135–144)
TOTAL PROTEIN: 4.8 G/DL (ref 6.4–8.3)
WBC # BLD: 10.3 K/UL (ref 3.5–11.3)
WBC # BLD: ABNORMAL 10*3/UL

## 2020-10-21 PROCEDURE — 99232 SBSQ HOSP IP/OBS MODERATE 35: CPT | Performed by: INTERNAL MEDICINE

## 2020-10-21 PROCEDURE — 2700000000 HC OXYGEN THERAPY PER DAY

## 2020-10-21 PROCEDURE — 99233 SBSQ HOSP IP/OBS HIGH 50: CPT | Performed by: INTERNAL MEDICINE

## 2020-10-21 PROCEDURE — 6370000000 HC RX 637 (ALT 250 FOR IP): Performed by: INTERNAL MEDICINE

## 2020-10-21 PROCEDURE — 2060000000 HC ICU INTERMEDIATE R&B

## 2020-10-21 PROCEDURE — 80053 COMPREHEN METABOLIC PANEL: CPT

## 2020-10-21 PROCEDURE — 2580000003 HC RX 258: Performed by: NURSE PRACTITIONER

## 2020-10-21 PROCEDURE — 85025 COMPLETE CBC W/AUTO DIFF WBC: CPT

## 2020-10-21 PROCEDURE — 83690 ASSAY OF LIPASE: CPT

## 2020-10-21 PROCEDURE — 83735 ASSAY OF MAGNESIUM: CPT

## 2020-10-21 PROCEDURE — 85014 HEMATOCRIT: CPT

## 2020-10-21 PROCEDURE — 6370000000 HC RX 637 (ALT 250 FOR IP): Performed by: NURSE PRACTITIONER

## 2020-10-21 PROCEDURE — 82330 ASSAY OF CALCIUM: CPT

## 2020-10-21 PROCEDURE — 85018 HEMOGLOBIN: CPT

## 2020-10-21 PROCEDURE — 94761 N-INVAS EAR/PLS OXIMETRY MLT: CPT

## 2020-10-21 PROCEDURE — 36415 COLL VENOUS BLD VENIPUNCTURE: CPT

## 2020-10-21 PROCEDURE — 82150 ASSAY OF AMYLASE: CPT

## 2020-10-21 PROCEDURE — 94660 CPAP INITIATION&MGMT: CPT

## 2020-10-21 PROCEDURE — 94640 AIRWAY INHALATION TREATMENT: CPT

## 2020-10-21 PROCEDURE — 6370000000 HC RX 637 (ALT 250 FOR IP): Performed by: PSYCHIATRY & NEUROLOGY

## 2020-10-21 PROCEDURE — 97535 SELF CARE MNGMENT TRAINING: CPT

## 2020-10-21 PROCEDURE — 6360000002 HC RX W HCPCS: Performed by: INTERNAL MEDICINE

## 2020-10-21 RX ORDER — METOPROLOL TARTRATE 5 MG/5ML
5 INJECTION INTRAVENOUS EVERY 4 HOURS PRN
Status: DISCONTINUED | OUTPATIENT
Start: 2020-10-21 | End: 2020-10-26 | Stop reason: HOSPADM

## 2020-10-21 RX ADMIN — AMOXICILLIN AND CLAVULANATE POTASSIUM 1 TABLET: 500; 125 TABLET, FILM COATED ORAL at 17:50

## 2020-10-21 RX ADMIN — THERA TABS 1 TABLET: TAB at 09:29

## 2020-10-21 RX ADMIN — AMOXICILLIN AND CLAVULANATE POTASSIUM 1 TABLET: 500; 125 TABLET, FILM COATED ORAL at 02:00

## 2020-10-21 RX ADMIN — HYDROXYZINE HYDROCHLORIDE 50 MG: 25 TABLET, FILM COATED ORAL at 14:21

## 2020-10-21 RX ADMIN — FOLIC ACID 1 MG: 1 TABLET ORAL at 09:30

## 2020-10-21 RX ADMIN — GABAPENTIN 200 MG: 100 CAPSULE ORAL at 09:29

## 2020-10-21 RX ADMIN — BISACODYL 20 MG: 5 TABLET ORAL at 16:49

## 2020-10-21 RX ADMIN — SODIUM CHLORIDE, PRESERVATIVE FREE 10 ML: 5 INJECTION INTRAVENOUS at 09:39

## 2020-10-21 RX ADMIN — BUSPIRONE HYDROCHLORIDE 15 MG: 15 TABLET ORAL at 09:30

## 2020-10-21 RX ADMIN — AMLODIPINE BESYLATE 5 MG: 10 TABLET ORAL at 09:30

## 2020-10-21 RX ADMIN — BUDESONIDE AND FORMOTEROL FUMARATE DIHYDRATE 2 PUFF: 160; 4.5 AEROSOL RESPIRATORY (INHALATION) at 20:24

## 2020-10-21 RX ADMIN — POTASSIUM CHLORIDE 40 MEQ: 1500 TABLET, EXTENDED RELEASE ORAL at 06:38

## 2020-10-21 RX ADMIN — CHLORDIAZEPOXIDE HYDROCHLORIDE 10 MG: 5 CAPSULE ORAL at 09:28

## 2020-10-21 RX ADMIN — PROPRANOLOL HYDROCHLORIDE 40 MG: 40 TABLET ORAL at 20:16

## 2020-10-21 RX ADMIN — PREDNISONE 40 MG: 20 TABLET ORAL at 09:28

## 2020-10-21 RX ADMIN — Medication 54 MG: at 09:29

## 2020-10-21 RX ADMIN — CARBAMAZEPINE 200 MG: 100 TABLET, EXTENDED RELEASE ORAL at 09:30

## 2020-10-21 RX ADMIN — Medication 100 MG: at 09:28

## 2020-10-21 RX ADMIN — BUSPIRONE HYDROCHLORIDE 15 MG: 15 TABLET ORAL at 14:21

## 2020-10-21 RX ADMIN — PROPRANOLOL HYDROCHLORIDE 40 MG: 40 TABLET ORAL at 09:30

## 2020-10-21 RX ADMIN — NICOTINE POLACRILEX 2 MG: 2 LOZENGE ORAL at 20:16

## 2020-10-21 RX ADMIN — CHLORDIAZEPOXIDE HYDROCHLORIDE 10 MG: 5 CAPSULE ORAL at 14:20

## 2020-10-21 RX ADMIN — HYDROXYZINE HYDROCHLORIDE 50 MG: 25 TABLET, FILM COATED ORAL at 09:39

## 2020-10-21 RX ADMIN — ROPINIROLE HYDROCHLORIDE 1 MG: 1 TABLET, FILM COATED ORAL at 20:15

## 2020-10-21 RX ADMIN — FAMOTIDINE 20 MG: 20 TABLET ORAL at 20:14

## 2020-10-21 RX ADMIN — TRAZODONE HYDROCHLORIDE 100 MG: 100 TABLET ORAL at 20:17

## 2020-10-21 RX ADMIN — AMOXICILLIN AND CLAVULANATE POTASSIUM 1 TABLET: 500; 125 TABLET, FILM COATED ORAL at 09:30

## 2020-10-21 RX ADMIN — BUDESONIDE AND FORMOTEROL FUMARATE DIHYDRATE 2 PUFF: 160; 4.5 AEROSOL RESPIRATORY (INHALATION) at 07:49

## 2020-10-21 RX ADMIN — SODIUM CHLORIDE: 9 INJECTION, SOLUTION INTRAVENOUS at 23:09

## 2020-10-21 RX ADMIN — BUSPIRONE HYDROCHLORIDE 15 MG: 15 TABLET ORAL at 02:00

## 2020-10-21 RX ADMIN — IPRATROPIUM BROMIDE AND ALBUTEROL SULFATE 1 AMPULE: .5; 3 SOLUTION RESPIRATORY (INHALATION) at 07:49

## 2020-10-21 RX ADMIN — HYDROXYZINE HYDROCHLORIDE 50 MG: 25 TABLET, FILM COATED ORAL at 20:14

## 2020-10-21 RX ADMIN — CARBAMAZEPINE 200 MG: 100 TABLET, EXTENDED RELEASE ORAL at 20:14

## 2020-10-21 RX ADMIN — GABAPENTIN 200 MG: 100 CAPSULE ORAL at 14:20

## 2020-10-21 RX ADMIN — CARBAMAZEPINE 200 MG: 100 TABLET, EXTENDED RELEASE ORAL at 14:20

## 2020-10-21 RX ADMIN — FERROUS SULFATE TAB EC 325 MG (65 MG FE EQUIVALENT) 325 MG: 325 (65 FE) TABLET DELAYED RESPONSE at 20:15

## 2020-10-21 RX ADMIN — Medication 54 MG: at 14:20

## 2020-10-21 RX ADMIN — Medication 54 MG: at 20:14

## 2020-10-21 RX ADMIN — IPRATROPIUM BROMIDE AND ALBUTEROL SULFATE 1 AMPULE: .5; 3 SOLUTION RESPIRATORY (INHALATION) at 20:15

## 2020-10-21 RX ADMIN — FERROUS SULFATE TAB EC 325 MG (65 MG FE EQUIVALENT) 325 MG: 325 (65 FE) TABLET DELAYED RESPONSE at 09:30

## 2020-10-21 RX ADMIN — BUSPIRONE HYDROCHLORIDE 15 MG: 15 TABLET ORAL at 20:15

## 2020-10-21 RX ADMIN — IPRATROPIUM BROMIDE AND ALBUTEROL SULFATE 1 AMPULE: .5; 3 SOLUTION RESPIRATORY (INHALATION) at 16:21

## 2020-10-21 RX ADMIN — LORAZEPAM 1 MG: 2 INJECTION INTRAMUSCULAR; INTRAVENOUS at 23:14

## 2020-10-21 RX ADMIN — FAMOTIDINE 20 MG: 20 TABLET ORAL at 09:30

## 2020-10-21 RX ADMIN — LORAZEPAM 1 MG: 2 INJECTION INTRAMUSCULAR; INTRAVENOUS at 04:31

## 2020-10-21 RX ADMIN — IPRATROPIUM BROMIDE AND ALBUTEROL SULFATE 1 AMPULE: .5; 3 SOLUTION RESPIRATORY (INHALATION) at 12:12

## 2020-10-21 RX ADMIN — CHLORDIAZEPOXIDE HYDROCHLORIDE 10 MG: 5 CAPSULE ORAL at 20:14

## 2020-10-21 RX ADMIN — POLYETHYLENE GLYCOL 3350, SODIUM SULFATE ANHYDROUS, SODIUM BICARBONATE, SODIUM CHLORIDE, POTASSIUM CHLORIDE 4000 ML: 236; 22.74; 6.74; 5.86; 2.97 POWDER, FOR SOLUTION ORAL at 16:58

## 2020-10-21 RX ADMIN — DULOXETINE HYDROCHLORIDE 60 MG: 30 CAPSULE, DELAYED RELEASE ORAL at 09:30

## 2020-10-21 RX ADMIN — NICOTINE POLACRILEX 2 MG: 2 GUM, CHEWING BUCCAL at 14:20

## 2020-10-21 RX ADMIN — GABAPENTIN 200 MG: 100 CAPSULE ORAL at 20:15

## 2020-10-21 RX ADMIN — POTASSIUM CHLORIDE 40 MEQ: 1500 TABLET, EXTENDED RELEASE ORAL at 09:28

## 2020-10-21 ASSESSMENT — PAIN SCALES - GENERAL
PAINLEVEL_OUTOF10: 0
PAINLEVEL_OUTOF10: 8
PAINLEVEL_OUTOF10: 0

## 2020-10-21 ASSESSMENT — ENCOUNTER SYMPTOMS
ABDOMINAL PAIN: 0
DIARRHEA: 1
NAUSEA: 0
VOMITING: 0
COUGH: 0
SHORTNESS OF BREATH: 0

## 2020-10-21 ASSESSMENT — PAIN DESCRIPTION - PAIN TYPE: TYPE: ACUTE PAIN

## 2020-10-21 ASSESSMENT — PAIN DESCRIPTION - DESCRIPTORS: DESCRIPTORS: STABBING

## 2020-10-21 ASSESSMENT — PAIN DESCRIPTION - LOCATION: LOCATION: LEG;BUTTOCKS

## 2020-10-21 NOTE — PROGRESS NOTES
[facility]    Office: [phone]    Shawn De Los Santos, DO, Dwain Salina, DO, Natalie Winslow, DO, Marilou Gorman Blood, DO, Guero Diamond MD, Hazel Sotomayor MD, Louie Nair MD, Duke Long MD, Yolanda Winslow MD, Ariadne Solo MD, Brittni Spicer MD, Kati Euceda MD, Mary Chung MD, Lamonte Cooks, DO, Andre Esparza MD, Kanu Damon MD, Janell Cain DO, Daya Verde MD,  [facility], DO, Sara Rosales MD, Ellen Casanova MD, [place], [facility], [facility], CNP, Ra Murphy, CNP, Chucky Castillo, [facility], [facility], CNP, Thaddeus Gamez, CNP, Yary Fernando, CNP, Cata Bower, CNP, Lidia Jones, CNP, Yadira Wagner PA-C, Tobi Gipson, [facility], Tj Lubin, [facility], [facility], CNP, Delia Zurita, CNP, Adolfo Jones, CNP, Mireille Camacho, [facility] [place]    Progress Note    10/21/2020    3:43 PM    Name:   Shalonda Bernstein  MRN:     [id number]     [place]:      [de-identified]   Room:   2003/2003-01   Day:  9  Admit Date:  10/12/2020  7:48 PM    PCP:   Raphael Manning MD  Code Status:  Full Code    Subjective:     C/C:   Chief Complaint   Patient presents with    Shortness of Breath       Interval History Status:    Somnolent  Not using her O2  O2 sat had dropped to 86%  Lying flat  No shortness of breath  She has been refusing PT    WBC 10.3 k/uL RBC 2.64Low m/uL Hemoglobin 8.5Low g/dL Hematocrit 28.9Low % . 5High     Lipase 76High     Alkaline Phosphatase 245High U/L   ALT 23 U/L AST 66High U/L   Total Bilirubin 0.39 mg/dL Total Protein 4.8Low g/dL  Alb 1.8Low     Glucose 99 mg/dL     BUN 4Low mg/dL CREATININE 0.20Low mg/dL    Calcium 7.8Low mg/dL   Sodium 143 mmol/L   Potassium 3.3Low       Brief History:     As documented in the medical record:  \"45year-old brought in by family after being noted to be hypoxic at her PCPs office.    Patient does complain of productive cough, bilateral lower extremity painx 2-3 months but demonstrated within   this lesion on recent MRI. This finding may resulting compressive affects on   the adjacent bile duct, as described on recent MRI. As such, short-term   imaging follow-up in 3-6 months is suggested. 2.  Mild peripancreatic edema suggestive of mild acute pancreatitis. 3.  Pleural effusions and dependent airspace disease, favoring atelectasis. 4.  Bilateral mild hydroureteronephrosis to the level of the bladder, which   is distended. This may be due to a functional versus mechanical bladder   outlet obstruction and resulting hydronephrosis. Punctate left   nephrolithiasis is noted without stone in either ureter or the bladder. 5.  Findings consistent with hepatic steatosis. 6.  Mild edematous appearance of the distal sigmoid and rectum, which may be   accentuated by underdistention. Possibility of colitis should be considered. 7.  Chronic appearing T11 compression deformity. Medications:      Allergies:  No Known Allergies    Current Meds:   Scheduled Meds:    polyethylene glycol  4,000 mL Oral Once    bisacodyl  20 mg Oral Once    DULoxetine  60 mg Oral Daily    sodium chloride  30 mL Intravenous Once    potassium chloride  40 mEq Oral Daily    amoxicillin-clavulanate  1 tablet Oral 3 times per day    predniSONE  40 mg Oral Daily    chlordiazePOXIDE  10 mg Oral TID    ipratropium-albuterol  1 ampule Inhalation Q4H WA    amLODIPine  5 mg Oral Daily    busPIRone  15 mg Oral Q6H    carBAMazepine  200 mg Oral TID    ferrous sulfate  325 mg Oral BID    folic acid  1 mg Oral Daily    gabapentin  200 mg Oral TID    hydrOXYzine  50 mg Oral TID    magnesium carbonate  54 mg Oral TID    propranolol  40 mg Oral BID    rOPINIRole  1 mg Oral Nightly    traZODone  100 mg Oral Nightly    vitamin B-1  100 mg Oral Daily    sodium chloride flush  10 mL Intravenous 2 times per day    famotidine  20 mg Oral BID    [Held by provider] enoxaparin  40 mg Subcutaneous Daily    multivitamin  1 tablet Oral Daily    budesonide-formoterol  2 puff Inhalation BID     Continuous Infusions:    sodium chloride 100 mL/hr at 10/20/20 0300     PRN Meds: metoprolol, potassium chloride **OR** potassium alternative oral replacement **OR** potassium chloride, magnesium sulfate, nicotine polacrilex, nicotine polacrilex, sodium chloride flush, oxyCODONE-acetaminophen, LORazepam, sodium chloride flush, acetaminophen **OR** [DISCONTINUED] acetaminophen, polyethylene glycol, promethazine **OR** ondansetron, nicotine, albuterol    Data:     Past Medical History:   has a past medical history of Closed fracture of lateral portion of left tibial plateau, COPD (chronic obstructive pulmonary disease) ([facility] [address] Depression, Hypertension, and Pain, joint, ankle and foot. Social History:   reports that she has been smoking cigarettes. She has a 30.00 pack-year smoking history. She has never used smokeless tobacco. She reports current alcohol use. She reports current drug use. Drug: Marijuana. Family History:   Family History   Problem Relation Age of Onset    Other Father     Cancer Maternal Grandmother     Heart Disease Paternal Grandmother        Review of Systems:     Review of Systems   Constitutional: Positive for activity change (Decreased), appetite change (Diminished) and fatigue. Negative for chills, diaphoresis and fever. Respiratory: Negative for cough and shortness of breath. Cardiovascular: Negative for chest pain and palpitations. Gastrointestinal: Positive for diarrhea (Diarrhea). Negative for abdominal pain, nausea and vomiting. Genitourinary: Negative for flank pain and hematuria. Skin: Positive for wound (Buttock wound reported). Neurological: Positive for weakness. Psychiatric/Behavioral: Positive for decreased concentration and sleep disturbance (Not sleeping well). Physical Examination:        Physical Exam  Vitals signs reviewed.    Constitutional: 11.0  --   --    SEDRATE  --   --  53*  --   --   --   --    CRP  --   --  97.7*  --   --   --   --     < > = values in this interval not displayed. Chemistry:  Recent Labs     10/19/20  0729 10/20/20  0533 10/20/20  0858 10/21/20  0532    144  --  143   K 3.0* 3.3*  --  3.3*    112*  --  113*   CO2 24 23  --  21   GLUCOSE 100* 84  --  99   BUN 9 8  --  4*   CREATININE <0.20* 0.25*  --  0.20*   MG 1.3* 1.4* 1.4* 1.9   ANIONGAP 11 9  --  9   LABGLOM CANNOT BE CALCULATED >60  --  >60   GFRAA CANNOT BE CALCULATED >60  --  >60   CALCIUM 7.9* 7.7*  --  7.8*   CAION  --   --   --  1.09*     Recent Labs     10/19/20  0729 10/20/20  0533 10/21/20  0532   PROT 5.1* 5.1* 4.8*   LABALBU 2.0* 2.0* 1.8*   AST 35* 74* 66*   ALT 13 19 23   ALKPHOS 215* 247* 245*   BILITOT 0.51 0.46 0.39   AMYLASE  --   --  34   LIPASE  --   --  76*     ABG:  Lab Results   Component Value Date    POCPH 7.452 10/16/2020    POCPCO2 37.0 10/16/2020    POCPO2 80.7 10/16/2020    POCHCO3 25.9 10/16/2020    NBEA NOT REPORTED 10/16/2020    PBEA 2 10/16/2020    ZNV3SQL 27 10/16/2020    DUJK5OER 96 10/16/2020    FIO2 55.0 10/16/2020     Lab Results   Component Value Date/Time    SPECIAL NOT REPORTED 10/13/2020 08:27 AM     Lab Results   Component Value Date/Time    CULTURE NORMAL RESPIRATORY SUSIE MODERATE GROWTH 10/13/2020 08:27 AM       Radiology:  Ct Abdomen Pelvis W Iv Contrast Additional Contrast? Oral    Result Date: 10/20/2020  1. Pancreatic head cyst is demonstrated, which may represent a pseudocyst or possibly side branch IPMN. No suspicious features were demonstrated within this lesion on recent MRI. This finding may resulting compressive affects on the adjacent bile duct, as described on recent MRI. As such, short-term imaging follow-up in 3-6 months is suggested. 2.  Mild peripancreatic edema suggestive of mild acute pancreatitis. 3.  Pleural effusions and dependent airspace disease, favoring atelectasis.  4.  Bilateral mild hydroureteronephrosis to the level of the bladder, which is distended. This may be due to a functional versus mechanical bladder outlet obstruction and resulting hydronephrosis. Punctate left nephrolithiasis is noted without stone in either ureter or the bladder. 5.  Findings consistent with hepatic steatosis. 6.  Mild edematous appearance of the distal sigmoid and rectum, which may be accentuated by underdistention. Possibility of colitis should be considered. 7.  Chronic appearing T11 compression deformity. Us Liver    Result Date: 10/19/2020  *Fatty infiltration of the liver. *Dilatation of the CBD measuring 11.3 mm. Gallbladder appears unremarkable. In addition, there appears to be a cystic lesion involving the head of the pancreas measuring 2.4 x 2.2 x 1.5 cm. Further evaluation with MRI/MRCP with and without IV contrast is recommended. Differential considerations includes small pseudocyst, duodenal diverticulum or possibly pancreatic cystic neoplasm. Findings were not present on the 07/15/2019 study. Xr Chest Portable    Result Date: 10/17/2020  Mild interval improvement in scattered pulmonary opacities. Decrease in size of pleural effusions, now minimal.     Xr Chest Portable    Result Date: 10/16/2020  Small bilateral pleural effusions. Worsening right lower lobe infiltrate and possibly new left lower lobe infiltrate. Xr Chest Portable    Result Date: 10/14/2020  Small right pleural effusion. Linear opacity in the right lung base, atelectasis versus pneumonia. Mri Abdomen W Wo Contrast Mrcp    Result Date: 10/19/2020  1. Cystic lesion at the pancreas may reflect periampullary duodenal diverticulum or pancreatic pseudocyst.  Correlative CT abdomen with IV and oral contrast recommended. 2. A number of the MR series are degraded by breathing motion, blurring detail. 3. Nonspecific dilated common bile duct without stricture or stone evident.  This appears to drain at the possible duodenal diverticulum, or may be mildly dilated because of extrinsic compression by pancreatic pseudocyst. 4. Hepatic steatosis and mild hepatomegaly. 5. Small volume bilateral pleural effusion with bibasilar consolidation. Pneumonia is a consideration. RECOMMENDATIONS: CT abdomen with IV and oral contrast         Assessment:        Principal Problem:    COPD exacerbation (HCC)  Active Problems:    Essential hypertension    Depression    Hypokalemia    Macrocytic anemia    Hypomagnesemia    Ambulatory dysfunction    Seizures (HCC)    Alcohol withdrawal syndrome without complication (HCC)    Paresthesia of lower extremity    Acute respiratory failure with hypoxia (HCC)    Pancreatic cyst    Recurrent major depressive disorder (HCC)    Elevated CA 19-9 level    Acute alcoholic intoxication without complication (HCC)  Resolved Problems:    * No resolved hospital problems. *      Plan:        Encourage compliance to oxygen and BiPAP  Encourage compliance to PT  Respiratory Therapy and Bronchodilators prn  Pulmonary evaluation  Observe for further DTs  Thiamine  Ativan  Psych evaluation noted  Anemia w/u on outpatient basis is suggested    Correct electrolyte abnormalities  Physical therapy and rehabilitation    Seizure precautions  Aspiration precautions  GI evaluation and follow-up  Eventual EGD and colonoscopy  Check C. difficile: Diarrhea  Wound care: Buttock wound  , Hx of LVSD, outpatient echo   The patient now agrees to skilled nursing facility  Discharge planning initiated  Complete work-up on outpatient basis  Will discharge when arrangements complete and ok with other services.   Follow-up with PCP in one week, LIZBETH CAIN MD  Notify PCP of discharge     IP CONSULT TO HOSPITALIST  IP CONSULT TO SOCIAL WORK  IP CONSULT TO PSYCHIATRY  IP CONSULT TO PULMONOLOGY  IP CONSULT TO [place]  IP CONSULT TO GI  IP CONSULT TO IV TEAM  IP CONSULT TO IV TEAM    Willard Georges DO  10/21/2020  3:43 PM

## 2020-10-21 NOTE — PROGRESS NOTES
THE [facility] AT [facility]   Progress Note    Giuseppe Hobbs is a 45 y.o. female patient. Hospitalization Day:9      Chief consult reason:   GIB    Subjective:  Pt seen and examined. No bleeding overnight. Hgb stable 8.5-9.0  Pt continues to ask to go home\"just for the evening\"  LFT's improving. Pt had CT scan of A/P that showed Pancreatic head cyst which may be pseudocyst or branch IPMN, mild peripancreatic edema suggestive of mild acute pancreatitis, pleural effusions, hepatic steatosis, edematous sigmoid and rectum-possible colitis    VITALS:  BP (!) 143/83   Pulse 70   Temp 98.6 °F (37 °C) (Oral)   Resp 22   Ht 5' 5\" (1.651 m)   Wt 126 lb 12.8 oz (57.5 kg)   SpO2 94%   BMI 21.10 kg/m²   TEMPERATURE:  Current - Temp: 98.6 °F (37 °C); Max - Temp  Av.1 °F (36.7 °C)  Min: 97.5 °F (36.4 °C)  Max: 98.6 °F (37 °C)    Physical Assessment:  General appearance:  restless  Mental Status:  oriented to person, place   Lungs:  clear to auscultation bilaterally, normal effort  Heart:  regular rate and rhythm, no murmur  Abdomen:  soft, nontender, nondistended, normal bowel sounds, no masses, hepatomegaly, splenomegaly  Extremities:  no edema, redness, tenderness in the calves  Skin:  no gross lesions, rashes, induration    Data Review:    Labs and Imaging:     CBC:  Recent Labs     10/19/20  0729  10/20/20  0533  10/20/20  1918 10/21/20  0137 10/21/20  0532 10/21/20  0644 10/21/20  1404   WBC 9.2  --  9.1  --   --   --  10.3  --   --    HGB 8.4*  8.3*   < > 8.5*   < > 9.0* 8.9* 8.5* 8.9* 9.0*   .9*  --  111.7*  --   --   --  109.5*  --   --    RDW 17.0*  --  16.8*  --   --   --  16.7*  --   --      --  405  --   --   --  437  --   --     < > = values in this interval not displayed. ANEMIA STUDIES:  No results for input(s): LABIRON, TIBC, FERRITIN, XUMZXZTN09, FOLATE, OCCULTBLD in the last 72 hours.     BMP:  Recent Labs     10/19/20  0729 10/20/20  0533 10/21/20  0532    144 143 K 3.0* 3.3* 3.3*    112* 113*   CO2 24 23 21   BUN 9 8 4*   CREATININE <0.20* 0.25* 0.20*   GLUCOSE 100* 84 99   CALCIUM 7.9* 7.7* 7.8*       LFTS:  Recent Labs     10/19/20  0729 10/20/20  0533 10/21/20  0532   ALKPHOS 215* 247* 245*   ALT 13 19 23   AST 35* 74* 66*   BILITOT 0.51 0.46 0.39   LABALBU 2.0* 2.0* 1.8*       Amylase/Lipase and Ammonia:  Recent Labs     10/21/20  0532   AMYLASE 34   LIPASE 76*       Acute Hepatitis Panel:  Lab Results   Component Value Date    HEPBSAG NONREACTIVE 07/14/2019    HEPCAB NONREACTIVE 07/14/2019    HEPBIGM NONREACTIVE 07/14/2019    HEPAIGM NONREACTIVE 07/14/2019       HCV Genotype:  No results found for: HEPATITISCGENOTYPE    HCV Quantitative:  No results found for: HCVQNT    LIVER WORK UP:    AFP  Lab Results   Component Value Date    AFP 5.1 10/19/2020       Alpha 1 antitrypsin   No results found for: A1A    Anti - Liver/Kidney Ab  No results found for: LIVER-KIDNEYMICROSOMALAB    BRADLEY  No results found for: BRADLEY    AMA  No results found for: MITOAB    ASMA  No results found for: SMOOTHMUSCAB    Ceruloplasmin  No results found for: CERULOPLSM    Celiac panel  No results found for: TISSTRNTIIGG, TTGIGA, IGA    PT/INR  No results for input(s): PROTIME, INR in the last 72 hours. Cancer Markers:  CEA:  No results for input(s): CEA in the last 72 hours. Ca 125:  No results for input(s):  in the last 72 hours. Ca 19-9:   Invalid input(s):   AFP:   Recent Labs     10/19/20  1223   AFP 5.1     Lactic acid:Invalid input(s): LACTIC ACID    Radiology Review:    Us Liver    Result Date: 10/19/2020  EXAMINATION: RIGHT UPPER QUADRANT ULTRASOUND 10/19/2020 9:19 am COMPARISON: Liver ultrasound July 15, 2019. HISTORY: ORDERING SYSTEM PROVIDED HISTORY: hx of fatty liver and hepatomegaly TECHNOLOGIST PROVIDED HISTORY: hx of fatty liver and hepatomegaly FINDINGS: LIVER:  Diffuse fatty infiltration. No focal mass or intrahepatic bile duct dilatation.   Hepatopetal flow to contact me with any questions or concerns.      2 [facility]

## 2020-10-21 NOTE — PROGRESS NOTES
Writer messaged the on call physician at 0500 about patients blood pressure steadily increasing over multiple vital checks. The last one was 127/112 provider ordered Metoprolol 5 mg injection every four hours as needed.  Writer will continue to monitor

## 2020-10-21 NOTE — PROGRESS NOTES
Occupational Therapy  Facility/Department: [facility] CAR 2  Daily Treatment Note  NAME: Jerrica Briseno  : 1970  MRN: [id number]    Date of Service: 10/21/2020    Discharge Recommendations:  Patient would benefit from continued therapy after discharge in order to increase cognition, activity tolerance and independence. Pt not safe to return to residence and is at increased risk for falls. Assessment   Performance deficits / Impairments: Decreased functional mobility ; Decreased ADL status; Decreased safe awareness;Decreased high-level IADLs;Decreased balance;Decreased endurance;Decreased cognition;Decreased strength  Prognosis: Good  OT Education: OT Role;Low Vision Education; Energy Conservation  Patient Education: purpose of OT; proper hand and foot placement; pursed lip breathing  Barriers to Learning: pt demo F carry over with increased verbal instructions  REQUIRES OT FOLLOW UP: Yes  Activity Tolerance  Activity Tolerance: Treatment limited secondary to decreased cognition;Patient limited by pain  Safety Devices  Safety Devices in place: Yes  Type of devices: Patient at risk for falls; Left in bed;Call light within reach; Bed alarm in place(telesitter and bed buddies present)         Patient Diagnosis(es): The primary encounter diagnosis was COPD exacerbation ([address] Diagnoses of Hypoxia and Acute alcoholic intoxication without complication ([address] were also pertinent to this visit. has a past medical history of Closed fracture of lateral portion of left tibial plateau, COPD (chronic obstructive pulmonary disease) ([address] Depression, Hypertension, and Pain, joint, ankle and foot. has a past surgical history that includes Hysterectomy (); Brain tumor excision (); fracture surgery (Left, 7-3-13); and fracture surgery (Right).     Restrictions  Restrictions/Precautions  Restrictions/Precautions: General Precautions, Fall Risk, Up as Tolerated, Seizure  Required Braces or Orthoses?: No  Position Activity Restriction  Other position/activity restrictions: 2L O2 NC  Subjective   General  Chart Reviewed: Yes  Patient assessed for rehabilitation services?: Yes  Family / Caregiver Present: No  Pain Assessment  Pain Assessment: 0-10  Pain Level: 8  Pain Type: Acute pain  Pain Location: Leg;Buttocks  Pain Descriptors: Stabbing  Non-Pharmaceutical Pain Intervention(s): Distraction;Repositioned; Therapeutic presence  Vital Signs  Patient Currently in Pain: Yes  Oxygen Therapy  SpO2: 96 %  O2 Device: Nasal cannula  O2 Flow Rate (L/min): 2 L/min   Orientation  Orientation  Overall Orientation Status: Within Functional Limits  Objective    ADL  Grooming: Stand by assistance;Setup(hair brushing and face washing completed seated at EOB)  Additional Comments: Pt supine in bed at start of session. Pt hallucinating req therapeutic use of self to decrease agitation, succesful. Pt agreeable to sit at EOB for ADLs completing grooming tasks however abruptly returning to supine in bed sec to pain and decreased cog. Balance  Sitting Balance: Stand by assistance(pt tolerated approx 4 min dynamic sitting)  Standing Balance: Unable to assess(comment)  Standing Balance  Comment: pt declined  Bed mobility  Supine to Sit: Minimal assistance(req assist with trunk)  Sit to Supine: Stand by assistance  Scooting: Contact guard assistance  Comment: HOB elevated utilizing bed rails  Transfers  Sit to stand: Unable to assess  Stand to sit: Unable to assess  Transfer Comments: pt declined  Cognition  Overall Cognitive Status: Exceptions  Arousal/Alertness: Appropriate responses to stimuli  Following Commands:  Follows all commands without difficulty  Attention Span: Attends with cues to redirect  Safety Judgement: Decreased awareness of need for assistance;Decreased awareness of need for safety  Problem Solving: Decreased awareness of errors;Assistance required to identify errors made;Assistance required to correct errors made  Insights:

## 2020-10-21 NOTE — PROGRESS NOTES
Physical Therapy  DATE: 10/21/2020    NAME: Ernesto Brambila  MRN: [id number]   : 1970    Patient not seen this date for Physical Therapy due to:  [] Blood transfusion in progress  [] Hemodialysis  [x]  Patient Declined  -  2nd attempt to see pt today. Pt declined. RN is present.  [] Spine Precautions   [] Strict Bedrest  [] Surgery/ Procedure  [] Testing      [] Other        [] PT being discontinued at this time. Patient independent. No further needs. [] PT being discontinued at this time as the patient has been transferred to palliative care. No further needs.     Gene Rajan, PTA

## 2020-10-21 NOTE — PROGRESS NOTES
[facility]cruz Constantino, [facility]. COPD exacerbation ([facility] [address] [J44.1] . Vitals:    10/21/20 1700   BP: (!) 153/99   Pulse: 71   Resp: 21   Temp:    SpO2: 95%   . Patients home meds are   Prior to Admission medications    Medication Sig Start Date End Date Taking?  Authorizing Provider   tiotropium (SPIRIVA RESPIMAT) 1.25 MCG/ACT AERS inhaler Inhale 2 puffs into the lungs daily 10/12/20   Ludivina Justice MD   tiZANidine (ZANAFLEX) 4 MG tablet Take 1 tablet by mouth every 8 hours as needed (back pain) 10/12/20   Ludivina Justice MD   potassium chloride (KLOR-CON M) 20 MEQ extended release tablet Take 1 tablet by mouth daily 9/10/20   Odilia Ovalles APRN - SAPNA   magnesium carbonate (MAGONATE) 54 (Mag Equiv) MG/5ML LIQD oral liquid Take 5 mLs by mouth 3 times daily 7/16/20   Ludivina Justice MD   ferrous sulfate (IRON 325) 325 (65 Fe) MG tablet Take 1 tablet by mouth 2 times daily 7/16/20   Ludivina Justice MD   rOPINIRole (REQUIP) 1 MG tablet Take 1 tablet by mouth nightly 7/8/20   Ludivina Justice MD   ACETAMINOPHEN EXTRA STRENGTH 500 MG tablet Take 500 mg by mouth 3 times daily as needed 5/8/20   Historical Provider, MD   ibuprofen (ADVIL;MOTRIN) 800 MG tablet Take 800 mg by mouth 3 times daily as needed 5/15/20   Historical Provider, MD   propranolol (INDERAL) 40 MG tablet Take 40 mg by mouth 2 times daily 5/12/20   Historical Provider, MD   albuterol sulfate HFA (VENTOLIN HFA) 108 (90 Base) MCG/ACT inhaler Inhale 2 puffs into the lungs 4 times daily as needed for Wheezing 6/11/20   Ludivina Justice MD   busPIRone (BUSPAR) 15 MG tablet Take 15 mg by mouth every 6 hours 6/11/20   Ludivina Justice MD   escitalopram (LEXAPRO) 20 MG tablet Take 1.5 tablets by mouth daily 6/11/20   Ludivina Justice MD   traZODone (DESYREL) 100 MG tablet Take 1 tablet by mouth nightly 6/11/20   Ludivina Justice MD   carBAMazepine (TEGRETOL XR) 200 MG extended release tablet Take 1 tablet by mouth 3 times daily 6/11/20   Ludivina Joel MD   amLODIPine (NORVASC) 5 MG tablet Take 1 tablet by mouth daily 6/11/20   Ludivina Joel MD   gabapentin (NEURONTIN) 100 MG capsule Take 2 capsules by mouth 3 times daily for 180 days.  Intended supply: 90 days 6/11/20 12/8/20  Ludivina Joel MD   hydrOXYzine (ATARAX) 50 MG tablet Take 1 tablet by mouth 3 times daily 6/11/20   Ludivina Joel MD   acamprosate (CAMPRAL) 333 MG tablet Take 2 tablets by mouth 3 times daily 12/10/19 10/12/20  LOI Call NP   vitamin B-1 (THIAMINE) 100 MG tablet Take 1 tablet by mouth daily 7/12/19   Ludivina Joel MD   vitamin D (ERGOCALCIFEROL) 72157 units CAPS capsule Take 1 capsule by mouth once a week 6/19/19   Ludivina Joel MD   folic acid (FOLVITE) 1 MG tablet Take 1 tablet by mouth daily 6/19/19   Ludivina Joel MD   .      RR 21  Breath Sounds: clear/diminished       Bronchodilator assessment at level  3  Hyperinflation assessment at level   Secretion Management assessment at level      [x]    Bronchodilator Assessment  BRONCHODILATOR ASSESSMENT SCORE  Score 0 1 2 3 4 5   Breath Sounds   []  Patient Baseline []  No Wheeze good aeration []  Faint, scattered wheezing, good aeration [x]  Expiratory Wheezing and or moderately diminished []  Insp/Exp wheeze and/or very diminished []  Insp/Exp and/ or marked distress   Respiratory Rate   []  Patient Baseline []  Less than 20 []  Less than 20 [x]  20-25 []  Greater than 25 []  Greater than 25   Peak flow % of Pred or PB [x]  NA   []  Greater than 90%  []  81-90% []  71-80% []  Less than or equal to 70%  or unable to perform []  Unable due to Respiratory Distress   Dyspnea re []  Patient Baseline []  No SOB []  No SOB [x]  SOB on exertion []  SOB min activity []  At rest/acute   e FEV% Predicted       [x]  NA []  Above 69%  []  Unable []  Above 60-69%  []  Unable []  Above 50-59%  []  Unable []  Above 35-49%  []  Unable []  Less than 35%  []  Unable                 [] Hyperinflation Assessment  Score 1 2 3   CXR and Breath Sounds   []  Clear []  No atelectasis  Basilar aeration []  Atelectasis or absent basilar breath sounds   Incentive Spirometry Volume  (Per IBW)   []  Greater than or equal to 15ml/Kg []  less than 15ml/Kg []  less than 15ml/Kg   Surgery within last 2 weeks []  None or general   []  Abdominal or thoracic surgery  []  Abdominal or thoracic   Chronic Pulmonary Historyre []  No []  Yes []  Yes     []  Secretion Management Assessment  Score 1 2 3   Bilateral Breath Sounds   []  Occasional Rhonchi []  Scattered Rhonchi []  Course Rhonchi and/or poor aeration   Sputum    []  Small amount of thin secretions []  Moderate amount of viscous secretions []  Copius, Viscious Yellow/ Secretions   CXR as reported by physician []  clear  []  Unavailable []  Infiltrates and/or consolidation  []  Unavailable []  Mucus Plugging and or lobar consolidation  []  Unavailable   Cough []  Strong, productive cough []  Weak productive cough []  No cough or weak non-productive cough   Lorri Fuentes  6:09 PM                            FEMALE                                  MALE                            FEV1 Predicted Normal Values                        FEV1 Predicted Normal Values          Age                                     Height in Feet and Inches       Age                                     Height in Feet and Inches       4' 11\" 5' 1\" 5' 3\" 5' 5\" 5' 7\" 5' 9\" 5' 11\" 6' 1\"  4' 11\" 5' 1\" 5' 3\" 5' 5\" 5' 7\" 5' 9\" 5' 11\" 6' 1\"   42 - 45 2.49 2.66 2.84 3.03 3.22 3.42 3.62 3.83 42 - 45 2.82 3.03 3.26 3.49 3.72 3.96 4.22 4.47   46 - 49 2.40 2.57 2.76 2.94 3.14 3.33 3.54 3.75 46 - 49 2.70 2.92 3.14 3.37 3.61 3.85 4.10 4.36   50 - 53 2.31 2.48 2.66 2.85 3.04 3.24 3.45 3.66 50 - 53 2.58 2.80 3.02 3.25 3.49 3.73 3.98 4.24   54 - 57 2.21 2.38 2.57 2.75 2.95 3.14 3.35 3.56 54 - 57 2.46 2.67 2.89 3.12 3.36 3.60 3.85 4.11   58 - 61 2.10 2.28 2.46 2.65 2.84 3.04 3.24 3.45 58 - 61 2.32 2.54 2. 76 2.99 3.23 3.47 3.72 3.98   62 - 65 1.99 2.17 2.35 2.54 2.73 2.93 3.13 3.34 62 - 65 2.19 2.40 2.62 2.85 3.09 3.33 3.58 3.84   66 - 69 1.88 2.05 2.23 2.42 2.61 2.81 3.02 3.23 66 - 69 2.04 2.26 2.48 2.71 2.95 3.19 3.44 3.70   70+ 1.82 1.99 2.17 2.36 2.55 2.75 2.95 3.16 70+ 1.97 2.19 2.41 2.64 2.87 3.12 3.37 3.62             Predicted Peak Expiratory Flow Rate                                       Height (in)  Female       Height (in) Male           Age 64 62 64 63 57 71 78 74 Age            21 344 357 372 387 402 417 432 446  60 62 64 66 68 70 72 74 76   25 337 352 366 381 396 411 426 441 25 447 476 505 533 562 591 [phone]   30 329 344 359 374 389 404 419 434 30 437 466 494 523 [phone] 667   35 322 337 351 366 381 396 411 426 35 426 455 484 512 541 [phone] 657   40 314 329 344 359 374 389 404 419 40 416 445 473 502 531 559 588 617 647   45 307 322 336 351 366 381 396 411 45 405 434 463 491 520 549 577 606 636   50 299 314 329 344 359 374 389 404 50 395 424 452 481 510 538 567 596 625   55 292 307 321 336 351 366 381 396 55 384 413 442 470 499 528 556 585 615   60 284 299 314 329 344 359 374 389 60 374 403 431 460 489 517 546 575 605   65 277 292 306 321 336 351 366 381 65 363 392 421 449 478 507 535 564 594   70 269 284 299 314 329 344 359 374 70 353 382 410 439 468 496 525 554 583   75 261 274 289 305 319 334 348 364 75 344 372 400 429 458 487 515 544 573   80 253 266 282 296 312 327 342 356 80 335 362 390 419 448 476 [phone]

## 2020-10-21 NOTE — PLAN OF CARE
Problem: Falls - Risk of:  Goal: Will remain free from falls  Description: Will remain free from falls  10/21/2020 0118 by Isma Samano RN  Outcome: Ongoing  10/20/2020 1252 by Jennifer Joseph RN  Outcome: Ongoing  Goal: Absence of physical injury  Description: Absence of physical injury  10/21/2020 0118 by Imsa Samano RN  Outcome: Ongoing  10/20/2020 1252 by Jennifer Joseph RN  Outcome: Ongoing     Problem: Pain:  Goal: Pain level will decrease  Description: Pain level will decrease  10/21/2020 0118 by Isma Samano RN  Outcome: Ongoing  10/20/2020 1252 by Jennifer Joseph RN  Outcome: Ongoing  Goal: Control of acute pain  Description: Control of acute pain  10/21/2020 0118 by Isma Samano RN  Outcome: Ongoing  10/20/2020 1252 by Jennifer Joseph RN  Outcome: Ongoing  Goal: Control of chronic pain  Description: Control of chronic pain  10/21/2020 0118 by Isma Samano RN  Outcome: Ongoing  10/20/2020 1252 by Jennifer Joseph RN  Outcome: Ongoing     Problem: Breathing Pattern - Ineffective:  Goal: Ability to achieve and maintain a regular respiratory rate will improve  Description: Ability to achieve and maintain a regular respiratory rate will improve  10/21/2020 0118 by Isma Samano RN  Outcome: Ongoing  10/20/2020 1252 by Jennifer Joseph RN  Outcome: Ongoing     Problem: Gas Exchange - Impaired:  Goal: Levels of oxygenation will improve  Description: Levels of oxygenation will improve  10/21/2020 0118 by Isma Samano RN  Outcome: Ongoing  10/20/2020 1252 by Jennifer Joseph RN  Outcome: Ongoing     Problem: Skin Integrity:  Goal: Will show no infection signs and symptoms  Description: Will show no infection signs and symptoms  10/21/2020 0118 by Isma Samano RN  Outcome: Ongoing  10/20/2020 1252 by Jennifer Joseph RN  Outcome: Ongoing  Goal: Absence of new skin breakdown  Description: Absence of new skin breakdown  10/21/2020 0118 by Isma Samano RN  Outcome: Ongoing  10/20/2020 1252 by Ryan Angela Ramirez RN  Outcome: Ongoing     Problem: Nutrition  Goal: Optimal nutrition therapy  Description: Nutrition Problem #1: Inadequate oral intake  Intervention: Food and/or Nutrient Delivery: Continue NPO(Start diet as able; recommend ensure enlive supplements TID as able)  Nutritional Goals: Restart diet within 24-72 hrs     10/21/2020 0118 by Ismael Kumar RN  Outcome: Ongoing  10/20/2020 1252 by Modesto Burns RN  Outcome: Ongoing

## 2020-10-21 NOTE — PROGRESS NOTES
PULMONARY & CRITICAL CARE MEDICINE CONSULT NOTE     Patient:  Paz Hurst  MRN: [id number]  Admit date: 10/12/2020  Primary Care Physician: Destini Waters MD  Consulting Physician: Willard Georges DO  CODE Status: Full Code     HISTORY     CHIEF COMPLAINT/REASON FOR CONSULT:    Interval History 10/21/2020  Patient was seen this morning and events noted chart reviewed discussed with nursing staff. Patient was lethargic when I saw her and according to nursing staff she had received Ativan around 4:00 this morning. She was arousable wake up follow command and goes back to sleep. She is on BiPAP 10/5/40 according to chart she was on BiPAP last night  Although she is on 4-1/2 L nasal cannula but she was off nasal cannula when I saw her and she was saturating 90 to 95% on room air at that time. He is hemodynamically stable over last 24 hours and afebrile. No increase work of breathing respiratory distress or shortness of breath reported. Chest x-ray on 10/17/2020 shows bilateral lower lobe atelectasis/infiltrate with low lung volume and bilateral small pleural effusion which is slightly better than chest x-ray on 10/16/2020. Chest x-ray on 10/12/2016 did not show infiltrate consolidation or effusion at that time. HISTORY OF PRESENT ILLNESS:  The patient is a 45 y.o. female with a past medical history of COPD presented to the PCP and noted to be hypoxic. Associated cough and bilateral lower extremity pain x 2-3 days. No relief with home bronchodilators. Smoking 1 ppd. Daily ETOH. Patient noted to be afebrile. Non tachycardic. Hemodynamically stable. Hypoxic to 84 % on RA. BMP unremarkable. . Trop negative. ETOH 244 with mild transaminitis. Macrocytic anemia with no leukocytosis. Covid negative. Negative Duplex b/l lower extremities. CXR are showing worsening right and left lower lobe infiltrates.  Patient has been receiving ativan for alcohol withdrawal. Initial VBG 7.401 / 50.4 / 57.7 / 31.3. Patient currently placed on BiPAP at this time. Interval History:  10/21/20  Patient is currently on @ O2 Flow Rate (L/min)  Av.5 L/min  Min: 3 L/min  Max: 40 L/min  T-max is . TMAX[24, and the white count is   WBC   Date Value Ref Range Status   10/21/2020 10.3 3.5 - 11.3 k/uL Final       PAST MEDICAL HISTORY:        Diagnosis Date    Closed fracture of lateral portion of left tibial plateau 8596    COPD (chronic obstructive pulmonary disease) (HCC)     Depression     bipolar, major depressive disorder, ptsd, anxiety    Hypertension     Pain, joint, ankle and foot      PAST SURGICAL HISTORY:        Procedure Laterality Date    BRAIN TUMOR EXCISION      astrocytoma times 2    FRACTURE SURGERY Left 7-3-13    ORIF tibial plateau    FRACTURE SURGERY Right     small finger metacarpal fracture    HYSTERECTOMY       FAMILY HISTORY:       Problem Relation Age of Onset    Other Father     Cancer Maternal Grandmother     Heart Disease Paternal Grandmother      SOCIAL HISTORY:   TOBACCO:   reports that she has been smoking cigarettes. She has a 30.00 pack-year smoking history. She has never used smokeless tobacco.  ETOH:  reports current alcohol use. DRUGS: reports current drug use. Drug: Marijuana. ALLERGIES:    No Known Allergies      HOME MEDICATIONS:  Prior to Admission medications    Medication Sig Start Date End Date Taking?  Authorizing Provider   tiotropium (SPIRIVA RESPIMAT) 1.25 MCG/ACT AERS inhaler Inhale 2 puffs into the lungs daily 10/12/20   Ludivina Johnson MD   tiZANidine (ZANAFLEX) 4 MG tablet Take 1 tablet by mouth every 8 hours as needed (back pain) 10/12/20   Ludivina Johnson MD   potassium chloride (KLOR-CON M) 20 MEQ extended release tablet Take 1 tablet by mouth daily 9/10/20   Melissa Ariza APRN - CNP   magnesium carbonate (MAGONATE) 54 (Mag Equiv) MG/5ML LIQD oral liquid Take 5 mLs by mouth 3 times daily 20   Ludivina Johnson MD   ferrous IMMUNIZATIONS:  Most Recent Immunizations   Administered Date(s) Administered    Influenza Vaccine, unspecified formulation 10/18/2018    Influenza Virus Vaccine 09/10/2019    Influenza, Caroline Geronimo, IM, (6 mo and older Fluzone, Flulaval, Fluarix and 3 yrs and older Afluria) 09/10/2019    MMR 2006       REVIEW OF SYSTEMS:    Review of Systems -Limited as she is lethargic but she was arousable easily and answer some question mostly negative  General ROS: negative for fever, chills, night sweats  Ophthalmic ROS: negative for redness drainage dryness  ENT ROS: negative for postnasal drip epistaxis nasal obstruction  Allergy and Immunology ROS: negative for drug reaction urticaria  Hematological and Lymphatic ROS: negative for easy bleeding or bruising  Endocrine ROS: negative for tremors, heat and cold intolerance  Respiratory ROS: Positive for cough, positive sputum production, negative for shortness of breath, positive for wheezing, negative for chest pain hemoptysis   Cardiovascular ROS: Positive for dyspnea on activity, negative for chest pain negative for syncope  Gastrointestinal ROS:negative for nausea vomiting diarrhea abdominal pain  Genito-Urinary ROS: negative for hematuria and dysuria  Musculoskeletal ROS: negative for stiffness of joint and joint swelling  Neurological ROS: negative for headache, seizure, gait abnormalities, syncope, weakness  Dermatological ROS: negative for skin rash and skin lesion    PHYSICAL EXAMINATION     VITAL SIGNS:   LAST-  BP (!) 141/91   Pulse 67   Temp 97.5 °F (36.4 °C) (Oral)   Resp 15   Ht 5' 5\" (1.651 m)   Wt 126 lb 12.8 oz (57.5 kg)   SpO2 100%   BMI 21.10 kg/m²   8-24 HR RANGE-  TEMP Temp  Av.7 °F (36.5 °C)  Min: 96.6 °F (35.9 °C)  Max: 98.2 °F (98.2 °C)   BP Systolic (41PEX), KOH:097 , Min:127 , IYN:383      Diastolic (88NYN), AFN:52, Min:84, Max:112     PULSE Pulse  Av.6  Min: 67  Max: 72   RR Resp  Av.4  Min: 15  Max: 22   O2 SAT SpO2 mg Oral Daily    sodium chloride flush  10 mL Intravenous 2 times per day    famotidine  20 mg Oral BID    [Held by provider] enoxaparin  40 mg Subcutaneous Daily    multivitamin  1 tablet Oral Daily    budesonide-formoterol  2 puff Inhalation BID     Continuous Infusions:   sodium chloride 100 mL/hr at 10/20/20 0300     INPUT/OUTPUT:  In: 2100 [I.V.:2100]  Out: -   Date 10/21/20 0000 - 10/21/20 2359   Shift 0000-0759 6353-6477 2412-0326 24 Hour Total   INTAKE   I.V.(mL/kg) 1200(20.9)   1200(20.9)   Shift Total(mL/kg) 1200(20.9)   1200(20.9)   OUTPUT   Shift Total(mL/kg)       Weight (kg) 57.5 57.5 57.5 57.5       LABS:-  ABG:   No results for input(s): POCPH, POCPCO2, POCPO2, POCHCO3, RIOI4TTO in the last 72 hours. CBC:   Recent Labs     10/19/20  0729  10/20/20  0533  10/21/20  0137 10/21/20  0532 10/21/20  0644   WBC 9.2  --  9.1  --   --  10.3  --    HGB 8.4*  8.3*   < > 8.5*   < > 8.9* 8.5* 8.9*   HCT 27.3*  27.8*   < > 29.7*   < > 30.3* 28.9* 29.4*   .9*  --  111.7*  --   --  109.5*  --      --  405  --   --  437  --    LYMPHOPCT 16*  --  14*  --   --  15*  --    RBC 2.53*  --  2.66*  --   --  2.64*  --    MCH 32.8  --  32.0  --   --  32.2  --    MCHC 29.9  --  28.6  --   --  29.4  --    RDW 17.0*  --  16.8*  --   --  16.7*  --     < > = values in this interval not displayed. BMP:   Recent Labs     10/19/20  0729 10/20/20  0533 10/21/20  0532    144 143   K 3.0* 3.3* 3.3*    112* 113*   CO2 24 23 21   BUN 9 8 4*   CREATININE <0.20* 0.25* 0.20*   GLUCOSE 100* 84 99     Liver Function Test:   Recent Labs     10/21/20  0532   PROT 4.8*   LABALBU 1.8*   ALT 23   AST 66*   ALKPHOS 245*   BILITOT 0.39     Amylase/Lipase:  Recent Labs     10/21/20  0532   AMYLASE 34   LIPASE 76*     Coagulation Profile:   No results for input(s): INR, PROTIME, APTT in the last 72 hours. Cardiac Enzymes:  No results for input(s): CKTOTAL, CKMB, CKMBINDEX, TROPONINI in the last 72 hours.   Lactic Acid:  No results found for: LACTA  BNP:   No results found for: BNP  D-Dimer:  Lab Results   Component Value Date    DDIMER 2.17 12/15/2018     Others:   Lab Results   Component Value Date    TSH 0.69 07/10/2020     Lab Results   Component Value Date    SEDRATE 53 (H) 10/20/2020    CRP 97.7 (H) 10/20/2020     No results found for: Felipe Britoius  Lab Results   Component Value Date    IRON 30 (L) 07/10/2020    TIBC 164 (L) 07/10/2020    FERRITIN 223 (H) 07/10/2020     No results found for: SPEP, UPEP  Lab Results   Component Value Date     123 10/19/2020     Microbiology:    Pathology:    Radiology Reports:  CT ABDOMEN PELVIS W IV CONTRAST Additional Contrast? Oral   Final Result   1. Pancreatic head cyst is demonstrated, which may represent a pseudocyst or   possibly side branch IPMN. No suspicious features were demonstrated within   this lesion on recent MRI. This finding may resulting compressive affects on   the adjacent bile duct, as described on recent MRI. As such, short-term   imaging follow-up in 3-6 months is suggested. 2.  Mild peripancreatic edema suggestive of mild acute pancreatitis. 3.  Pleural effusions and dependent airspace disease, favoring atelectasis. 4.  Bilateral mild hydroureteronephrosis to the level of the bladder, which   is distended. This may be due to a functional versus mechanical bladder   outlet obstruction and resulting hydronephrosis. Punctate left   nephrolithiasis is noted without stone in either ureter or the bladder. 5.  Findings consistent with hepatic steatosis. 6.  Mild edematous appearance of the distal sigmoid and rectum, which may be   accentuated by underdistention. Possibility of colitis should be considered. 7.  Chronic appearing T11 compression deformity. MRI ABDOMEN W WO CONTRAST MRCP   Final Result   1.  Cystic lesion at the pancreas may reflect periampullary duodenal   diverticulum or pancreatic pseudocyst. 05:16 PM  ----------------------------------------------------------------------------    ----------------------------------------------------------------------------   Electronically signed by Aparna Gazra(Sonographer) on 12/17/2019   03:29 PM  ----------------------------------------------------------------------------  FINDINGS  Left Atrium  Left atrium is normal in size. Left Ventricle  Left ventricle is normal in size. Global left ventricular systolic function  is mildly reduced. Estimated ejection fraction is 40-45 % . Calculated EF via Guadarrama's method is 42 %. Calculated EF via heart model is 45 %. Normal left ventricular wall thickness. Right Atrium  Right atrium is normal in size. Right Ventricle  Normal right ventricle size and function. Mitral Valve  Normal mitral valve structure. Trivial mitral regurgitation. No mitral stenosis. Aortic Valve  Aortic valve structure and function normal.  Aortic valve is trileaflet. No aortic insufficiency. No aortic stenosis. Tricuspid Valve  Normal tricuspid valve leaflets. Trivial tricuspid regurgitation. No tricuspid stenosis. Insignificant tricuspid regurgitation, unable to estimate RVSP. Pulmonic Valve  Pulmonic valve is normal in structure and function. No pulmonic insufficiency. No evidence of pulmonic stenosis. Pericardial Effusion  No significant pericardial effusion is seen. Miscellaneous  Normal aortic root dimension. The ascending aorta is normal in size. E/E' = 10.55 . IVC Increased diameter, but still has inspiratory variation suggesting upper  normal or mildly elevated RA filling pressure (i.e. CVP) .     M-mode / 2D Measurements & Calculations:      LVIDd:4.6 cm(3.7 - 5.6 cm)       Diastolic LQNSKQ:09.44 ml   LVIDs:3.4 cm(2.2 - 4.0 cm)       Systolic DMHFQJ:17.28 ml   IVSd:1.1 cm(0.6 - 1.1 cm)        Aortic Root:3.2 cm(2.0 - 3.7 cm)   LVPWd:1.1 cm(0.6 - 1.1 cm)       LA Dimension: 3.6 cm(1.9 - 4.0 cm)   Fractional Shortenin.09 %    LA volume/Index: 48.37 ml /29m^2   Calculated LVEF (%): 38.8 %      LVOT:2.1 cm                                    RVDd:3.2 cm      Mitral:                             Aortic      Peak E-Wave: 1.11 m/s               Peak Velocity: 1.23 m/s                                       Mean Velocity: 0.85 m/s   Peak Gradient: 4.93 mmHg            Peak Gradient: 6.05 mmHg   Mean Gradient: 3 mmHg               Mean Gradient: 4 mmHg                                          Area (continuity): 2.49 cm^2   Area (continuity): 2.4 cm^2         AV VTI: 25.9 cm   Mean Velocity: 0.71 m/s                                          Pulmonic:                                          Peak Velocity: 0.86 m/s                                       Peak Gradient: 2.92 mmHg     Diastology / Tissue Doppler  Septal Wall E' velocity:0.13 m/s  Septal Wall E/E':8.7  Lateral Wall E' velocity:0.09 m/s  Lateral Wall E/E':12.4       Cardiac Catheterization:   No results found for this or any previous visit. ASSESSMENT AND PLAN     Assessment:    // Acute Hypercapnic Hypoxic respiratory Failure  // Alcohol abuse and withdrawal  // Tobacco abuse  // COPD with exacerbation  // Bilateral basilar atelectasis/infiltrate  //  Bilateral small pleural effusion  //  HTN      Plan:    Encourage mobilization of secretion deep breathing cough. Encourage incentive spirometry and Acapella. Continue with BiPAP at night and intermittently during the daytime and as needed. Continue supplemental O2 and wean O2 discussed with nursing staff to keep saturation 90%  On Augmentin for possible aspiration  Alcohol withdrawal treatment in place per primary service/CIWA protocol. Aspiration precaution keep head of bed up  Recommend to DC CIWA protocol and use Ativan if needed for agitation and delirium  Continue with DuoNeb and symbicort  Prednisone to complete course of steroids x 5 days. ON MV, thiamine, folate  Patient has reduced EF 45 last year. Consider repeat echocardiogram at this time. Long history of alcohol abuse. I personally interviewed/examined the patient; reviewed interval history, interpreted all available radiographic and laboratory data at the time of service. Electronically signed byDemond Kulkarni MD  10/21/2020 10:25 AM    Please note that this chart was generated using voice recognition Dragon dictation software. Although every effort was made to ensure the accuracy of this automated transcription, some errors in transcription may have occurred.

## 2020-10-21 NOTE — TELEPHONE ENCOUNTER
I see patient is in the hospital.     Friendly from [address] called due to needed an addendum to last OV 10/12/2020. The O2 needs to list at room air @ rest by the 84%. I'm I allowed to list or do you want to put in an addendum with more info or wait until pt is discharged?    PLEASE ADVISE

## 2020-10-21 NOTE — CARE COORDINATION
Transitional planning-talked with patient. OK with SNF. [facility] list for SNF given to patient. 1150 call from mother Aliyah Sparrow her that patient agreed to SNF-mother wanting [facility].  Faxed referral and talked with [facility]

## 2020-10-21 NOTE — PLAN OF CARE
Problem: Falls - Risk of:  Goal: Will remain free from falls  Description: Will remain free from falls  10/21/2020 1259 by Matthieu Ruiz RN  Outcome: Ongoing  10/21/2020 0118 by Ritu Ruiz RN  Outcome: Ongoing  Goal: Absence of physical injury  Description: Absence of physical injury  10/21/2020 1259 by Matthieu Ruiz RN  Outcome: Ongoing  10/21/2020 0118 by Ritu Ruiz RN  Outcome: Ongoing     Problem: Nutrition  Goal: Optimal nutrition therapy  Description: Nutrition Problem #1: Inadequate oral intake  Intervention: Food and/or Nutrient Delivery: Continue NPO(Start diet as able; recommend ensure enlive supplements TID as able)  Nutritional Goals: Restart diet within 24-72 hrs     10/21/2020 1259 by Matthieu Ruiz RN  Outcome: Ongoing  10/21/2020 0118 by Ritu Ruiz RN  Outcome: Ongoing     Problem: Breathing Pattern - Ineffective:  Goal: Ability to achieve and maintain a regular respiratory rate will improve  Description: Ability to achieve and maintain a regular respiratory rate will improve  10/21/2020 1259 by Matthieu Ruiz RN  Outcome: Ongoing  10/21/2020 0118 by Ritu Ruiz RN  Outcome: Ongoing     Problem: Gas Exchange - Impaired:  Goal: Levels of oxygenation will improve  Description: Levels of oxygenation will improve  10/21/2020 1259 by Matthieu Ruiz RN  Outcome: Ongoing  10/21/2020 0118 by Ritu Ruiz RN  Outcome: Ongoing     Problem: Skin Integrity:  Goal: Will show no infection signs and symptoms  Description: Will show no infection signs and symptoms  10/21/2020 1259 by Matthieu Ruiz RN  Outcome: Ongoing  10/21/2020 0118 by Ritu Ruiz RN  Outcome: Ongoing  Goal: Absence of new skin breakdown  Description: Absence of new skin breakdown  10/21/2020 1259 by Matthieu Ruiz RN  Outcome: Ongoing  10/21/2020 0118 by Ritu Ruiz RN  Outcome: Ongoing     Problem: Respiratory  Intervention: Respiratory assessment  10/21/2020 1047 by Gaby Markham RCP  Note: BRONCHOSPASM/BRONCHOCONSTRICTION     []         IMPROVE AERATION/BREATH SOUNDS  []   ADMINISTER BRONCHODILATOR THERAPY AS APPROPRIATE  []   ASSESS BREATH SOUNDS  []   IMPLEMENT AEROSOL/MDI PROTOCOL  []   PATIENT EDUCATION AS NEEDED

## 2020-10-22 ENCOUNTER — ANESTHESIA (OUTPATIENT)
Dept: ENDOSCOPY | Age: 50
DRG: 190 | End: 2020-10-22
Payer: COMMERCIAL

## 2020-10-22 ENCOUNTER — ANESTHESIA EVENT (OUTPATIENT)
Dept: ENDOSCOPY | Age: 50
DRG: 190 | End: 2020-10-22
Payer: COMMERCIAL

## 2020-10-22 ENCOUNTER — TELEPHONE (OUTPATIENT)
Dept: FAMILY MEDICINE CLINIC | Age: 50
End: 2020-10-22

## 2020-10-22 VITALS
OXYGEN SATURATION: 94 % | RESPIRATION RATE: 16 BRPM | DIASTOLIC BLOOD PRESSURE: 73 MMHG | SYSTOLIC BLOOD PRESSURE: 97 MMHG

## 2020-10-22 LAB
-: NORMAL
ABSOLUTE EOS #: 0 K/UL (ref 0–0.4)
ABSOLUTE IMMATURE GRANULOCYTE: 0 K/UL (ref 0–0.3)
ABSOLUTE LYMPH #: 1.94 K/UL (ref 1–4.8)
ABSOLUTE MONO #: 0.46 K/UL (ref 0.1–0.8)
ALBUMIN SERPL-MCNC: 2 G/DL (ref 3.5–5.2)
ALBUMIN/GLOBULIN RATIO: 0.6 (ref 1–2.5)
ALP BLD-CCNC: 262 U/L (ref 35–104)
ALT SERPL-CCNC: 31 U/L (ref 5–33)
ANION GAP SERPL CALCULATED.3IONS-SCNC: 11 MMOL/L (ref 9–17)
AST SERPL-CCNC: 63 U/L
BASOPHILS # BLD: 0 % (ref 0–2)
BASOPHILS ABSOLUTE: 0 K/UL (ref 0–0.2)
BILIRUB SERPL-MCNC: 0.47 MG/DL (ref 0.3–1.2)
BUN BLDV-MCNC: 4 MG/DL (ref 6–20)
BUN/CREAT BLD: ABNORMAL (ref 9–20)
CALCIUM SERPL-MCNC: 8.3 MG/DL (ref 8.6–10.4)
CAMPYLOBACTER PCR: NORMAL
CHLORIDE BLD-SCNC: 114 MMOL/L (ref 98–107)
CO2: 17 MMOL/L (ref 20–31)
CREAT SERPL-MCNC: 0.39 MG/DL (ref 0.5–0.9)
DIFFERENTIAL TYPE: ABNORMAL
E COLI ENTEROTOXIGENIC PCR: NORMAL
EOSINOPHILS RELATIVE PERCENT: 0 % (ref 1–4)
GFR AFRICAN AMERICAN: >60 ML/MIN
GFR NON-AFRICAN AMERICAN: >60 ML/MIN
GFR SERPL CREATININE-BSD FRML MDRD: ABNORMAL ML/MIN/{1.73_M2}
GFR SERPL CREATININE-BSD FRML MDRD: ABNORMAL ML/MIN/{1.73_M2}
GLUCOSE BLD-MCNC: 96 MG/DL (ref 70–99)
HCT VFR BLD CALC: 32.5 % (ref 36.3–47.1)
HEMOGLOBIN: 8.9 G/DL (ref 11.9–15.1)
IMMATURE GRANULOCYTES: 0 %
LYMPHOCYTES # BLD: 17 % (ref 24–44)
MCH RBC QN AUTO: 33 PG (ref 25.2–33.5)
MCHC RBC AUTO-ENTMCNC: 27.4 G/DL (ref 28.4–34.8)
MCV RBC AUTO: 120.4 FL (ref 82.6–102.9)
MONOCYTES # BLD: 4 % (ref 1–7)
MORPHOLOGY: ABNORMAL
MORPHOLOGY: ABNORMAL
NRBC AUTOMATED: 0 PER 100 WBC
PDW BLD-RTO: 16.8 % (ref 11.8–14.4)
PLATELET # BLD: 308 K/UL (ref 138–453)
PLATELET ESTIMATE: ABNORMAL
PLESIOMONAS SHIGELLOIDES PCR: NORMAL
PMV BLD AUTO: 11 FL (ref 8.1–13.5)
POTASSIUM SERPL-SCNC: 3.7 MMOL/L (ref 3.7–5.3)
RBC # BLD: 2.7 M/UL (ref 3.95–5.11)
RBC # BLD: ABNORMAL 10*6/UL
REASON FOR REJECTION: NORMAL
SALMONELLA PCR: NORMAL
SEG NEUTROPHILS: 79 % (ref 36–66)
SEGMENTED NEUTROPHILS ABSOLUTE COUNT: 9 K/UL (ref 1.8–7.7)
SHIGATOXIN GENE PCR: NORMAL
SHIGELLA SP PCR: NORMAL
SODIUM BLD-SCNC: 142 MMOL/L (ref 135–144)
SPECIMEN DESCRIPTION: NORMAL
TOTAL PROTEIN: 5.5 G/DL (ref 6.4–8.3)
VIBRIO PCR: NORMAL
WBC # BLD: 11.4 K/UL (ref 3.5–11.3)
WBC # BLD: ABNORMAL 10*3/UL
YERSINIA ENTEROCOLITICA PCR: NORMAL
ZZ NTE CLEAN UP: ORDERED TEST: NORMAL
ZZ NTE WITH NAME CLEAN UP: SPECIMEN SOURCE: NORMAL

## 2020-10-22 PROCEDURE — 94660 CPAP INITIATION&MGMT: CPT

## 2020-10-22 PROCEDURE — 7100000011 HC PHASE II RECOVERY - ADDTL 15 MIN: Performed by: INTERNAL MEDICINE

## 2020-10-22 PROCEDURE — 2500000003 HC RX 250 WO HCPCS: Performed by: NURSE ANESTHETIST, CERTIFIED REGISTERED

## 2020-10-22 PROCEDURE — 6370000000 HC RX 637 (ALT 250 FOR IP): Performed by: INTERNAL MEDICINE

## 2020-10-22 PROCEDURE — 94761 N-INVAS EAR/PLS OXIMETRY MLT: CPT

## 2020-10-22 PROCEDURE — 3700000001 HC ADD 15 MINUTES (ANESTHESIA): Performed by: INTERNAL MEDICINE

## 2020-10-22 PROCEDURE — 3609012400 HC EGD TRANSORAL BIOPSY SINGLE/MULTIPLE: Performed by: INTERNAL MEDICINE

## 2020-10-22 PROCEDURE — 80053 COMPREHEN METABOLIC PANEL: CPT

## 2020-10-22 PROCEDURE — 3609027000 HC COLONOSCOPY: Performed by: INTERNAL MEDICINE

## 2020-10-22 PROCEDURE — 85025 COMPLETE CBC W/AUTO DIFF WBC: CPT

## 2020-10-22 PROCEDURE — 0DJD8ZZ INSPECTION OF LOWER INTESTINAL TRACT, VIA NATURAL OR ARTIFICIAL OPENING ENDOSCOPIC: ICD-10-PCS | Performed by: INTERNAL MEDICINE

## 2020-10-22 PROCEDURE — 7100000010 HC PHASE II RECOVERY - FIRST 15 MIN: Performed by: INTERNAL MEDICINE

## 2020-10-22 PROCEDURE — 88305 TISSUE EXAM BY PATHOLOGIST: CPT

## 2020-10-22 PROCEDURE — 2709999900 HC NON-CHARGEABLE SUPPLY: Performed by: INTERNAL MEDICINE

## 2020-10-22 PROCEDURE — 1200000000 HC SEMI PRIVATE

## 2020-10-22 PROCEDURE — 0DB38ZX EXCISION OF LOWER ESOPHAGUS, VIA NATURAL OR ARTIFICIAL OPENING ENDOSCOPIC, DIAGNOSTIC: ICD-10-PCS | Performed by: INTERNAL MEDICINE

## 2020-10-22 PROCEDURE — 45378 DIAGNOSTIC COLONOSCOPY: CPT | Performed by: INTERNAL MEDICINE

## 2020-10-22 PROCEDURE — 76937 US GUIDE VASCULAR ACCESS: CPT

## 2020-10-22 PROCEDURE — 94640 AIRWAY INHALATION TREATMENT: CPT

## 2020-10-22 PROCEDURE — 88342 IMHCHEM/IMCYTCHM 1ST ANTB: CPT

## 2020-10-22 PROCEDURE — 99232 SBSQ HOSP IP/OBS MODERATE 35: CPT | Performed by: INTERNAL MEDICINE

## 2020-10-22 PROCEDURE — 43239 EGD BIOPSY SINGLE/MULTIPLE: CPT | Performed by: INTERNAL MEDICINE

## 2020-10-22 PROCEDURE — 36415 COLL VENOUS BLD VENIPUNCTURE: CPT

## 2020-10-22 PROCEDURE — 6370000000 HC RX 637 (ALT 250 FOR IP): Performed by: PSYCHIATRY & NEUROLOGY

## 2020-10-22 PROCEDURE — 6370000000 HC RX 637 (ALT 250 FOR IP): Performed by: NURSE PRACTITIONER

## 2020-10-22 PROCEDURE — 6360000002 HC RX W HCPCS: Performed by: NURSE ANESTHETIST, CERTIFIED REGISTERED

## 2020-10-22 PROCEDURE — 0DB68ZX EXCISION OF STOMACH, VIA NATURAL OR ARTIFICIAL OPENING ENDOSCOPIC, DIAGNOSTIC: ICD-10-PCS | Performed by: INTERNAL MEDICINE

## 2020-10-22 PROCEDURE — 2700000000 HC OXYGEN THERAPY PER DAY

## 2020-10-22 PROCEDURE — 2580000003 HC RX 258: Performed by: NURSE ANESTHETIST, CERTIFIED REGISTERED

## 2020-10-22 PROCEDURE — 3700000000 HC ANESTHESIA ATTENDED CARE: Performed by: INTERNAL MEDICINE

## 2020-10-22 RX ORDER — SODIUM CHLORIDE 9 MG/ML
INJECTION, SOLUTION INTRAVENOUS CONTINUOUS PRN
Status: DISCONTINUED | OUTPATIENT
Start: 2020-10-22 | End: 2020-10-22 | Stop reason: SDUPTHER

## 2020-10-22 RX ORDER — PROPOFOL 10 MG/ML
INJECTION, EMULSION INTRAVENOUS PRN
Status: DISCONTINUED | OUTPATIENT
Start: 2020-10-22 | End: 2020-10-22 | Stop reason: SDUPTHER

## 2020-10-22 RX ORDER — PREDNISONE 20 MG/1
20 TABLET ORAL DAILY
Status: DISCONTINUED | OUTPATIENT
Start: 2020-10-26 | End: 2020-10-26 | Stop reason: HOSPADM

## 2020-10-22 RX ORDER — MIDAZOLAM HYDROCHLORIDE 1 MG/ML
INJECTION INTRAMUSCULAR; INTRAVENOUS PRN
Status: DISCONTINUED | OUTPATIENT
Start: 2020-10-22 | End: 2020-10-22 | Stop reason: SDUPTHER

## 2020-10-22 RX ORDER — LIDOCAINE HYDROCHLORIDE 10 MG/ML
INJECTION, SOLUTION INFILTRATION; PERINEURAL PRN
Status: DISCONTINUED | OUTPATIENT
Start: 2020-10-22 | End: 2020-10-22 | Stop reason: SDUPTHER

## 2020-10-22 RX ORDER — PREDNISONE 10 MG/1
10 TABLET ORAL DAILY
Status: DISCONTINUED | OUTPATIENT
Start: 2020-10-29 | End: 2020-10-26 | Stop reason: HOSPADM

## 2020-10-22 RX ADMIN — DULOXETINE HYDROCHLORIDE 60 MG: 30 CAPSULE, DELAYED RELEASE ORAL at 08:08

## 2020-10-22 RX ADMIN — BUSPIRONE HYDROCHLORIDE 15 MG: 15 TABLET ORAL at 20:14

## 2020-10-22 RX ADMIN — CHLORDIAZEPOXIDE HYDROCHLORIDE 10 MG: 5 CAPSULE ORAL at 20:20

## 2020-10-22 RX ADMIN — FERROUS SULFATE TAB EC 325 MG (65 MG FE EQUIVALENT) 325 MG: 325 (65 FE) TABLET DELAYED RESPONSE at 20:16

## 2020-10-22 RX ADMIN — BUSPIRONE HYDROCHLORIDE 15 MG: 15 TABLET ORAL at 00:58

## 2020-10-22 RX ADMIN — CARBAMAZEPINE 200 MG: 100 TABLET, EXTENDED RELEASE ORAL at 16:36

## 2020-10-22 RX ADMIN — IPRATROPIUM BROMIDE AND ALBUTEROL SULFATE 1 AMPULE: .5; 3 SOLUTION RESPIRATORY (INHALATION) at 20:22

## 2020-10-22 RX ADMIN — CHLORDIAZEPOXIDE HYDROCHLORIDE 10 MG: 5 CAPSULE ORAL at 08:09

## 2020-10-22 RX ADMIN — MIDAZOLAM HYDROCHLORIDE 1 MG: 1 INJECTION, SOLUTION INTRAMUSCULAR; INTRAVENOUS at 12:32

## 2020-10-22 RX ADMIN — THERA TABS 1 TABLET: TAB at 08:07

## 2020-10-22 RX ADMIN — Medication 100 MG: at 08:07

## 2020-10-22 RX ADMIN — CARBAMAZEPINE 200 MG: 100 TABLET, EXTENDED RELEASE ORAL at 20:14

## 2020-10-22 RX ADMIN — AMOXICILLIN AND CLAVULANATE POTASSIUM 1 TABLET: 500; 125 TABLET, FILM COATED ORAL at 08:08

## 2020-10-22 RX ADMIN — GABAPENTIN 200 MG: 100 CAPSULE ORAL at 16:35

## 2020-10-22 RX ADMIN — AMLODIPINE BESYLATE 5 MG: 10 TABLET ORAL at 08:08

## 2020-10-22 RX ADMIN — PROPRANOLOL HYDROCHLORIDE 40 MG: 40 TABLET ORAL at 20:15

## 2020-10-22 RX ADMIN — PROPOFOL 160 MG: 10 INJECTION, EMULSION INTRAVENOUS at 12:32

## 2020-10-22 RX ADMIN — PROPRANOLOL HYDROCHLORIDE 40 MG: 40 TABLET ORAL at 09:09

## 2020-10-22 RX ADMIN — HYDROXYZINE HYDROCHLORIDE 50 MG: 25 TABLET, FILM COATED ORAL at 16:34

## 2020-10-22 RX ADMIN — Medication 54 MG: at 16:36

## 2020-10-22 RX ADMIN — OXYCODONE HYDROCHLORIDE AND ACETAMINOPHEN 1 TABLET: 5; 325 TABLET ORAL at 16:36

## 2020-10-22 RX ADMIN — BUDESONIDE AND FORMOTEROL FUMARATE DIHYDRATE 2 PUFF: 160; 4.5 AEROSOL RESPIRATORY (INHALATION) at 08:24

## 2020-10-22 RX ADMIN — HYDROXYZINE HYDROCHLORIDE 50 MG: 25 TABLET, FILM COATED ORAL at 08:07

## 2020-10-22 RX ADMIN — PREDNISONE 40 MG: 20 TABLET ORAL at 08:07

## 2020-10-22 RX ADMIN — IPRATROPIUM BROMIDE AND ALBUTEROL SULFATE 1 AMPULE: .5; 3 SOLUTION RESPIRATORY (INHALATION) at 08:24

## 2020-10-22 RX ADMIN — AMOXICILLIN AND CLAVULANATE POTASSIUM 1 TABLET: 500; 125 TABLET, FILM COATED ORAL at 00:58

## 2020-10-22 RX ADMIN — GABAPENTIN 200 MG: 100 CAPSULE ORAL at 20:14

## 2020-10-22 RX ADMIN — GABAPENTIN 200 MG: 100 CAPSULE ORAL at 08:07

## 2020-10-22 RX ADMIN — BUDESONIDE AND FORMOTEROL FUMARATE DIHYDRATE 2 PUFF: 160; 4.5 AEROSOL RESPIRATORY (INHALATION) at 20:32

## 2020-10-22 RX ADMIN — AMOXICILLIN AND CLAVULANATE POTASSIUM 1 TABLET: 500; 125 TABLET, FILM COATED ORAL at 20:13

## 2020-10-22 RX ADMIN — FERROUS SULFATE TAB EC 325 MG (65 MG FE EQUIVALENT) 325 MG: 325 (65 FE) TABLET DELAYED RESPONSE at 08:08

## 2020-10-22 RX ADMIN — CARBAMAZEPINE 200 MG: 100 TABLET, EXTENDED RELEASE ORAL at 08:08

## 2020-10-22 RX ADMIN — ROPINIROLE HYDROCHLORIDE 1 MG: 1 TABLET, FILM COATED ORAL at 20:14

## 2020-10-22 RX ADMIN — LIDOCAINE HYDROCHLORIDE 50 MG: 10 INJECTION, SOLUTION INFILTRATION; PERINEURAL at 12:32

## 2020-10-22 RX ADMIN — FAMOTIDINE 20 MG: 20 TABLET ORAL at 08:08

## 2020-10-22 RX ADMIN — HYDROXYZINE HYDROCHLORIDE 50 MG: 25 TABLET, FILM COATED ORAL at 20:14

## 2020-10-22 RX ADMIN — Medication 54 MG: at 22:09

## 2020-10-22 RX ADMIN — TRAZODONE HYDROCHLORIDE 100 MG: 100 TABLET ORAL at 20:15

## 2020-10-22 RX ADMIN — BUSPIRONE HYDROCHLORIDE 15 MG: 15 TABLET ORAL at 08:08

## 2020-10-22 RX ADMIN — POTASSIUM CHLORIDE 40 MEQ: 1500 TABLET, EXTENDED RELEASE ORAL at 08:07

## 2020-10-22 RX ADMIN — FOLIC ACID 1 MG: 1 TABLET ORAL at 08:08

## 2020-10-22 RX ADMIN — FAMOTIDINE 20 MG: 20 TABLET ORAL at 20:14

## 2020-10-22 RX ADMIN — SODIUM CHLORIDE: 9 INJECTION, SOLUTION INTRAVENOUS at 12:32

## 2020-10-22 RX ADMIN — Medication 54 MG: at 08:07

## 2020-10-22 RX ADMIN — CHLORDIAZEPOXIDE HYDROCHLORIDE 10 MG: 5 CAPSULE ORAL at 16:35

## 2020-10-22 ASSESSMENT — ENCOUNTER SYMPTOMS
ABDOMINAL PAIN: 0
COUGH: 0
SHORTNESS OF BREATH: 1
DIARRHEA: 1
SHORTNESS OF BREATH: 0
NAUSEA: 0
VOMITING: 0

## 2020-10-22 ASSESSMENT — PAIN SCALES - GENERAL
PAINLEVEL_OUTOF10: 8
PAINLEVEL_OUTOF10: 0
PAINLEVEL_OUTOF10: 0
PAINLEVEL_OUTOF10: 7
PAINLEVEL_OUTOF10: 0

## 2020-10-22 ASSESSMENT — PAIN - FUNCTIONAL ASSESSMENT: PAIN_FUNCTIONAL_ASSESSMENT: 0-10

## 2020-10-22 ASSESSMENT — PAIN DESCRIPTION - PAIN TYPE: TYPE: ACUTE PAIN;CHRONIC PAIN

## 2020-10-22 ASSESSMENT — COPD QUESTIONNAIRES: CAT_SEVERITY: MODERATE
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
## 2020-10-22 NOTE — PROGRESS NOTES
Occupational Therapy    Occupational Therapy Not Seen Note    DATE: 10/22/2020  Name: Annette Wagner  : 1970  MRN: [id number]    Patient not available for Occupational Therapy due to:    Surgery/Procedure: Pt off floor for endoscopy    Next Scheduled Treatment: Attempt at later date    Electronically signed by NATALIE Andrew on 10/22/2020 at 12:09 PM

## 2020-10-22 NOTE — PLAN OF CARE
Problem: Falls - Risk of:  Goal: Will remain free from falls  Description: Will remain free from falls  10/21/2020 2032 by Jean Rodriguez  Outcome: Ongoing  10/21/2020 1259 by Sasha Eli RN  Outcome: Ongoing  Goal: Absence of physical injury  Description: Absence of physical injury  10/21/2020 2032 by Jean Rodriguez  Outcome: Ongoing  10/21/2020 1259 by Sasha Eli RN  Outcome: Ongoing     Problem: Pain:  Goal: Pain level will decrease  Description: Pain level will decrease  10/21/2020 2032 by Jean Rodriguez  Outcome: Ongoing  10/21/2020 1259 by Sasha Eli RN  Outcome: Ongoing  Goal: Control of acute pain  Description: Control of acute pain  10/21/2020 2032 by Jean Rodriguez  Outcome: Ongoing  10/21/2020 1259 by Sasha Eli RN  Outcome: Ongoing  Goal: Control of chronic pain  Description: Control of chronic pain  10/21/2020 2032 by Jean Rodriguez  Outcome: Ongoing  10/21/2020 1259 by Sasha Eli RN  Outcome: Ongoing     Problem: Breathing Pattern - Ineffective:  Goal: Ability to achieve and maintain a regular respiratory rate will improve  Description: Ability to achieve and maintain a regular respiratory rate will improve  10/21/2020 2032 by Jean Rodriguez  Outcome: Ongoing  10/21/2020 1259 by Sasha Eli RN  Outcome: Ongoing     Problem: Gas Exchange - Impaired:  Goal: Levels of oxygenation will improve  Description: Levels of oxygenation will improve  10/21/2020 2032 by Jean Rodriguez  Outcome: Ongoing  10/21/2020 1259 by Sasha Eli RN  Outcome: Ongoing     Problem: Skin Integrity:  Goal: Will show no infection signs and symptoms  Description: Will show no infection signs and symptoms  10/21/2020 2032 by Jean Rodriguez  Outcome: Ongoing  10/21/2020 1259 by Sasha Eli RN  Outcome: Ongoing  Goal: Absence of new skin breakdown  Description: Absence of new skin breakdown  10/21/2020 2032 by Jean Rodriguez  Outcome: Ongoing  10/21/2020 1259 by Sasha Eli RN  Outcome: Ongoing     Problem: Nutrition  Goal: Optimal nutrition therapy  Description: Nutrition Problem #1: Inadequate oral intake  Intervention: Food and/or Nutrient Delivery: Continue NPO(Start diet as able; recommend ensure enlive supplements TID as able)  Nutritional Goals: Restart diet within 24-72 hrs     10/21/2020 2032 by Alicia Tran  Outcome: Ongoing  10/21/2020 1259 by Latha Bermudez RN  Outcome: Ongoing
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
## 2020-10-22 NOTE — OP NOTE
[facility]    STA ENDOSCOPY     EGD    PROCEDURE DATE: 10/22/20    REFERRING PHYSICIAN: No ref. provider found     PRIMARY CARE PROVIDER: LIZBETH Alvarez MD    ATTENDING PHYSICIAN: Chela Beltran MD     HISTORY: Ms. Sussy Dickinson is a 45 y.o. female who presents to the [facility] Endoscopy unit for upper endoscopy. The patient's clinical history is remarkable for HTN, Depression, COPD, alcoholism and withdrawal, admitted for withdrawal symptoms, admitted for COPD exacerbation and intoxication initially, identified to have anemia with fecal occult positive results, GI consulted to evaluate for EGD and Colonoscopy . She is currently medically stable and appropriate for the planned procedure. PREOPERATIVE DIAGNOSIS: Anemia, obscure occult. PROCEDURES:   1) Transoral Upper Endoscopy with cold biopsy of the stomach and esophagus. POSTOPERATIVE DIAGNOSIS:     1) Large amount of residual gastric food, partially digested, residual medication identified in the stomach body. Limited visualization. 2) Mild non-specific gastritis s/p cold biopsy. 3) Thick white plaque coating of the esophagus in the distal portion, possible food debri vs contrast vs candida. Cold biopsy taken. 4) Limited exam    MEDICATIONS:   MAC per anesthesia     EBL: <10cc    INSTRUMENT: Olympus GIF-H180 flexible Gastroscope. PREPARATION: The nature and character of the procedure as well as risks, benefits, and alternatives were discussed with the patient and informed consent was obtained. Complications were said to include, but were not limited to: medication allergy, medication reaction, cardiovascular and respiratory problems, bleeding, perforation, infection, and/or missed diagnosis. Following arrival in the endoscopy room, the patient was placed in the left lateral decubitus position and final time-out accomplished in the presence of the nursing staff.  Baseline vital signs were obtained and reviewed, and IV sedation was subsequently initiated. FINDINGS:   Esophagus: The esophagus was inspected to the Z-line. The endoscopic exam showed thick white chalky content identified coating the esophagus that was washable and suctioned, cold biopsy taken to evaluate for possible candida. Stomach: The stomach was inspected in both forward and retroflex fashion and was appropriately distensible. The cardia, fundus, incisura, antrum and pylorus were identified via direct visualization. The endoscopic exam showed large amount of residual food and debri. Cold stomach biopsy taken. Duodenum: The proximal small bowel was inspected through the bulb, sweep, and second portion of the duodenum. The endoscopic exam showed normal appearing mucosa. IMPRESSION:    1) Large amount of residual gastric food, partially digested, residual medication identified in the stomach body. Limited visualization. 2) Mild non-specific gastritis s/p cold biopsy. 3) Thick white plaque coating of the esophagus in the distal portion, possible food debri vs contrast vs candida. Cold biopsy taken. 4) Limited exam      RECOMMENDATIONS:   1) Continue patient on PPI oral therapy. She will need repeat EGD as outpatient   2) Proceed with colonoscopy      [facility]     This note is created with the assistance of a speech recognition program.  While intending to generate a document that actually reflects the content of the visit, the document can still have some errors including those of syntax and sound a like substitutions which may escape proof reading. It such instances, actual meaning can be extrapolated by contextual diversion. The patient was counseled at length about the risks of marie Covid-19 during their perioperative period and any recovery window from their procedure.   The patient was made aware that marie Covid-19  may worsen their prognosis for recovering from their procedure  and lend to a higher morbidity and/or mortality risk. All material risks, benefits, and reasonable alternatives including postponing the procedure were discussed. The patient DOES wish to proceed with the procedure at this time. [facility]     [facility] ENDOSCOPY     COLONOSCOPY    PROCEDURE DATE: 10/22/20    REFERRING PHYSICIAN: No ref. provider found     PRIMARY CARE PROVIDER: LIZBETH Fuentes MD    ATTENDING PHYSICIAN: Raj Almanza MD     HISTORY: Ms. Osmel Willoughby is a 45 y.o. female who presents to the [facility] endoscopy unit for colonoscopy. The patient's clinical history is remarkable for history as detailed above. She is currently medically stable and appropriate for the planned procedure. PREOPERATIVE DIAGNOSIS: iron deficiency anemia. PROCEDURES:   Transanal Colonoscopy --diagnostic. POSTPROCEDURE DIAGNOSIS:    FAIR to POOR PREP    1) Large lobulated velvety and nodular mucosal growth extending from the anal verge into the gluteal, perineum, and vagina that grossly appears to suggest condyloma. 2) 7mm flat polyp at 25 cm and 8mm size flat polyp identified at 20 cm (not removed due to poor prep)   3) No signs of active bleeding observed, brown/green stool through out colon     MEDICATIONS:     MAC per anesthesia     EBL <10cc      INSTRUMENT: Olympus CF-H180 AL Pediatric flexible Colonoscope. PREPARATION: The nature and character of the procedure as well as risks, benefits, and alternatives were discussed with the patient and informed consent was obtained. Complications were said to include, but were not limited to: medication allergy, medication reaction, cardiovascular and respiratory problems, bleeding, perforation, infection, and/or missed diagnosis. Following arrival in the endoscopy room, the patient was placed in the left lateral decubitus position and final time-out accomplished in the presence of the nursing staff.  Baseline vital signs were obtained and reviewed, and IV sedation was subsequently initiated. FINDINGS: Rectal examination demonstrated no significant visible external abnormality and digital palpation was unremarkable. Following adequate conscious sedation the colonoscope was introduced and advanced under direct visualization to the cecum, which was identified by the ileocecal valve and appendiceal orifice. The bowel preparation was felt to be FAIR. This included large amounts of thick stool that was not completely able to be adequately irrigated and aspirated. Cecal intubation time was 9 minutes. Once maximally inserted, the endoscope was withdrawn and the mucosa was carefully inspected. The mucosal exam was revealed sigmoid polyps, not removed due to prep quality. Large condyloma appearing lesion identified exiting the anal canal and involving the perineum and groin. Withdrawal time was 23 minutes. Anogluteal lesions, suspected Condyloma:          IMPRESSION:     FAIR to POOR PREP    1) Large lobulated velvety and nodular mucosal growth extending from the anal verge into the gluteal, perineum, and vagina that grossly appears to suggest condyloma. 2) 7mm flat polyp at 25 cm and 8mm size flat polyp identified at 20 cm (not removed due to poor prep)   3) No signs of active bleeding observed, brown/green stool through out colon     RECOMMENDATIONS:   1) Recommend Colorectal surgery and Gynecology consult to evaluate anorectal and genital lesions. 2) Repeat Colonoscopy and EGD with in 3 months to remove polyps identified. Patient will need extended bowel prep at that time    3) Return back to medical floor.        [facility]   10/22/20    This note is created with the assistance of a speech recognition program.  While intending to generate a document that actually reflects the content of the visit, the document can still have some errors including those of syntax and sound a like substitutions which may escape proof reading. It such instances, actual meaning can be extrapolated by contextual diversion. The patient was counseled at length about the risks of marie Covid-19 during their perioperative period and any recovery window from their procedure. The patient was made aware that marie Covid-19  may worsen their prognosis for recovering from their procedure  and lend to a higher morbidity and/or mortality risk. All material risks, benefits, and reasonable alternatives including postponing the procedure were discussed. The patient DOES wish to proceed with the procedure at this time.
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
## 2020-10-22 NOTE — CARE COORDINATION
Transitional planning. Barbara from [facility] calls and wanted status. Pt needs to work with PT for a precert to be done(pt refused twice yesterday and was at test this AM) and pt needs to be off ativan for 24 hours.

## 2020-10-22 NOTE — PROGRESS NOTES
Physical Therapy  DATE: 10/22/2020    NAME: Nanette Graham  MRN: [id number]   : 1970    Patient not seen this date for Physical Therapy due to:  [] Blood transfusion in progress  [] Hemodialysis  []  Patient Declined  [] Spine Precautions   [] Strict Bedrest  [x] Procedure  -  EGD/colonoscopy. Will check on pt tomorrow. [] Testing      [] Other        [] PT being discontinued at this time. Patient independent. No further needs. [] PT being discontinued at this time as the patient has been transferred to palliative care. No further needs.     Rod Hamilton, PTA

## 2020-10-22 NOTE — PROGRESS NOTES
[facility]    Office: [address], DO, Alejandra Mondragon, DO, Jael Sumner, DO, Jose Alberto Saenz Blood, DO, Romelia Dunlap MD, Rhiannon Padron MD, Queen Demetrius MD, Marla Roy MD, Jorge L Scott MD, Sepideh Ayala MD, Nevaeh John MD, Nathanael Berger MD, Mary Robertsland MD Francesco, Janneth Herman DO, Kimmy Phillips MD, [place] MD, Jordan Mares DO, Michael Nixon MD,  Marcus Edmond DO, Hao Sosa MD, Elizabeth Saldivar MD, Waylon Dyson, [facility], [facility] Fernandes, CNP, Bria Oliver, CNP, Demetrio Villatoro, CNS, Flori Kc, CNP, Ally Dunn, CNP, Radha Meadows, CNP, Gricelda Sherman, CNP, Cira Gee, CNP, Adonis Montero PA-C, Alicia Milton, [facility], Trev Rhodes, CNP, Esther Abdul, CNP, Alyssia Nowak, [facility], Manpreet Huerta, CNP, [facility] [address]    Progress Note    10/22/2020    5:08 PM    Name:   Nanette Graham  MRN:     [id number]     [place]:      [de-identified]   Room:   2003/2003-01  IP Day:  8  Admit Date:  10/12/2020  7:48 PM    PCP:   James Bartlett MD  Code Status:  Full Code    Subjective:     C/C:   Chief Complaint   Patient presents with    Shortness of Breath       Interval History Status:    Weak  Somnolent  Weak  Has been declining PT  The patient reports that she had a home oxygen evaluation last week which indicated she would be approved for home oxygen  Home oxygen was not arranged before the patient was readmitted to the hospital    WBC 11.4High k/uL RBC 2.70Low m/uL Hemoglobin 8.9Low g/dL Hematocrit 32.5Low % .4High     Alb 2.0Low     Calcium 8.3Low       Brief History:     As documented in the medical record:  \"45year-old brought in by family after being noted to be hypoxic at her PCPs office. Patient does complain of productive cough, bilateral lower extremity painx 2-3 months but worse in the last month. Chronic unchanged shortness of breath going on for more than a year.   Has been using albuterol inhaler as needed. Continues to smoke about 1 pack/day. Drinks alcohol every day: Rum. Known history of hypertension, depression, COPD, excision of Astrocytoma. - ED evaluation showed patient was afebrile, hypoxic to 84% on room air, intoxicated with alcohol level 244, anemic hemoglobin 9.4, COVID rapid NAAT:  Negative, chest x-ray was unremarkable no pneumonia, high sensitive troponin 9, EKG with nonspecific ST-T changes. Lower extremity venous Dopplers negative for DVT. Initial findings and plan have included:  Acute COPD exacerbation- Bipap prn, oxygen, Pulmonary following, on Augmentin, Prednisone  Acute resp failure- Bipap  Etoh abuse- experiencing withdrawal, on Ciwa protocol, Librium  Depression- Psych consult   Pancreatic cyst- dw GI, needs MRI/ MRCP, CA 19-9 slightly elevated, AFP wnl  Tobacco abuse- refusing Nicotine patch, will try lozenges/ gum  Hypokalemia/ Hypomag- replace, schedule KCL, will start MagOx  Gi/ DVT proph\"    Subsequent database has included:  MRCP:  Impression:  1. Cystic lesion at the pancreas may reflect periampullary duodenal   diverticulum or pancreatic pseudocyst.  Correlative CT abdomen with IV and   oral contrast recommended. 2. A number of the MR series are degraded by breathing motion, blurring   detail. 3. Nonspecific dilated common bile duct without stricture or stone evident. This appears to drain at the possible duodenal diverticulum, or may be mildly   dilated because of extrinsic compression by pancreatic pseudocyst.   4. Hepatic steatosis and mild hepatomegaly. 5. Small volume bilateral pleural effusion with bibasilar consolidation. Pneumonia is a consideration. CT abdomen:  Impression:  1. Pancreatic head cyst is demonstrated, which may represent a pseudocyst or   possibly side branch IPMN. No suspicious features were demonstrated within   this lesion on recent MRI.   This finding may resulting compressive affects on   the adjacent bile duct, as described on recent MRI. As such, short-term   imaging follow-up in 3-6 months is suggested. 2.  Mild peripancreatic edema suggestive of mild acute pancreatitis. 3.  Pleural effusions and dependent airspace disease, favoring atelectasis. 4.  Bilateral mild hydroureteronephrosis to the level of the bladder, which   is distended. This may be due to a functional versus mechanical bladder   outlet obstruction and resulting hydronephrosis. Punctate left   nephrolithiasis is noted without stone in either ureter or the bladder. 5.  Findings consistent with hepatic steatosis. 6.  Mild edematous appearance of the distal sigmoid and rectum, which may be   accentuated by underdistention. Possibility of colitis should be considered. 7.  Chronic appearing T11 compression deformity. EGD 10/22:  1) Large amount of residual gastric food, partially digested, residual medication identified in the stomach body. Limited visualization. 2) Mild non-specific gastritis s/p cold biopsy. 3) Thick white plaque coating of the esophagus in the distal portion, possible food debri vs contrast vs candida. Cold biopsy taken. 4) Limited exam    Colonoscopy:    Medications:      Allergies:  No Known Allergies    Current Meds:   Scheduled Meds:    [START ON 10/23/2020] predniSONE  30 mg Oral Daily    Followed by   Susana Vieira ON 10/26/2020] predniSONE  20 mg Oral Daily    Followed by   Susana Vieira ON 10/29/2020] predniSONE  10 mg Oral Daily    DULoxetine  60 mg Oral Daily    sodium chloride  30 mL Intravenous Once    potassium chloride  40 mEq Oral Daily    amoxicillin-clavulanate  1 tablet Oral 3 times per day    chlordiazePOXIDE  10 mg Oral TID    ipratropium-albuterol  1 ampule Inhalation Q4H WA    amLODIPine  5 mg Oral Daily    busPIRone  15 mg Oral Q6H    carBAMazepine  200 mg Oral TID    ferrous sulfate  325 mg Oral BID    folic acid  1 mg Oral Daily    gabapentin  200 mg Oral TID    hydrOXYzine  50 mg Oral TID    magnesium carbonate  54 mg Oral TID    propranolol  40 mg Oral BID    rOPINIRole  1 mg Oral Nightly    traZODone  100 mg Oral Nightly    vitamin B-1  100 mg Oral Daily    sodium chloride flush  10 mL Intravenous 2 times per day    famotidine  20 mg Oral BID    [Held by provider] enoxaparin  40 mg Subcutaneous Daily    multivitamin  1 tablet Oral Daily    budesonide-formoterol  2 puff Inhalation BID     Continuous Infusions:    sodium chloride 100 mL/hr at 10/21/20 2309     PRN Meds: metoprolol, potassium chloride **OR** potassium alternative oral replacement **OR** potassium chloride, magnesium sulfate, nicotine polacrilex, nicotine polacrilex, sodium chloride flush, oxyCODONE-acetaminophen, LORazepam, sodium chloride flush, acetaminophen **OR** [DISCONTINUED] acetaminophen, polyethylene glycol, promethazine **OR** ondansetron, nicotine, albuterol    Data:     Past Medical History:   has a past medical history of Closed fracture of lateral portion of left tibial plateau, COPD (chronic obstructive pulmonary disease) ([address] Depression, Hypertension, and Pain, joint, ankle and foot. Social History:   reports that she has been smoking cigarettes. She has a 30.00 pack-year smoking history. She has never used smokeless tobacco. She reports current alcohol use. She reports current drug use. Drug: Marijuana. Family History:   Family History   Problem Relation Age of Onset    Other Father     Cancer Maternal Grandmother     Heart Disease Paternal Grandmother        Review of Systems:     Review of Systems   Constitutional: Positive for activity change (Decreased), appetite change (Diminished) and fatigue. Negative for chills, diaphoresis and fever. Respiratory: Negative for cough and shortness of breath. Cardiovascular: Negative for chest pain and palpitations. Gastrointestinal: Positive for diarrhea (Diarrhea). Negative for abdominal pain, nausea and vomiting.    Genitourinary: --  10.3  --   --  11.4*   RBC 2.66*  --  2.64*  --   --  2.70*   HGB 8.5*   < > 8.5* 8.9* 9.0* 8.9*   HCT 29.7*   < > 28.9* 29.4* 29.9* 32.5*   .7*  --  109.5*  --   --  120.4*   MCH 32.0  --  32.2  --   --  33.0   MCHC 28.6  --  29.4  --   --  27.4*   RDW 16.8*  --  16.7*  --   --  16.8*     --  437  --   --  308   MPV 10.8  --  11.0  --   --  11.0   SEDRATE 53*  --   --   --   --   --    CRP 97.7*  --   --   --   --   --     < > = values in this interval not displayed. Chemistry:  Recent Labs     10/20/20  0533 10/20/20  0858 10/21/20  0532 10/22/20  0940     --  143 142   K 3.3*  --  3.3* 3.7   *  --  113* 114*   CO2 23  --  21 17*   GLUCOSE 84  --  99 96   BUN 8  --  4* 4*   CREATININE 0.25*  --  0.20* 0.39*   MG 1.4* 1.4* 1.9  --    ANIONGAP 9  --  9 11   LABGLOM >60  --  >60 >60   GFRAA >60  --  >60 >60   CALCIUM 7.7*  --  7.8* 8.3*   CAION  --   --  1.09*  --      Recent Labs     10/20/20  0533 10/21/20  0532 10/22/20  0940   PROT 5.1* 4.8* 5.5*   LABALBU 2.0* 1.8* 2.0*   AST 74* 66* 63*   ALT 19 23 31   ALKPHOS 247* 245* 262*   BILITOT 0.46 0.39 0.47   AMYLASE  --  34  --    LIPASE  --  76*  --      ABG:  Lab Results   Component Value Date    POCPH 7.452 10/16/2020    POCPCO2 37.0 10/16/2020    POCPO2 80.7 10/16/2020    POCHCO3 25.9 10/16/2020    NBEA NOT REPORTED 10/16/2020    PBEA 2 10/16/2020    GHE7KXC 27 10/16/2020    ALKJ4GXT 96 10/16/2020    FIO2 55.0 10/16/2020     Lab Results   Component Value Date/Time    SPECIAL NOT REPORTED 10/13/2020 08:27 AM     Lab Results   Component Value Date/Time    CULTURE NORMAL RESPIRATORY SUSIE MODERATE GROWTH 10/13/2020 08:27 AM       Radiology:  Ct Abdomen Pelvis W Iv Contrast Additional Contrast? Oral    Result Date: 10/20/2020  1. Pancreatic head cyst is demonstrated, which may represent a pseudocyst or possibly side branch IPMN. No suspicious features were demonstrated within this lesion on recent MRI.   This finding may resulting compressive affects on the adjacent bile duct, as described on recent MRI. As such, short-term imaging follow-up in 3-6 months is suggested. 2.  Mild peripancreatic edema suggestive of mild acute pancreatitis. 3.  Pleural effusions and dependent airspace disease, favoring atelectasis. 4.  Bilateral mild hydroureteronephrosis to the level of the bladder, which is distended. This may be due to a functional versus mechanical bladder outlet obstruction and resulting hydronephrosis. Punctate left nephrolithiasis is noted without stone in either ureter or the bladder. 5.  Findings consistent with hepatic steatosis. 6.  Mild edematous appearance of the distal sigmoid and rectum, which may be accentuated by underdistention. Possibility of colitis should be considered. 7.  Chronic appearing T11 compression deformity. Us Liver    Result Date: 10/19/2020  *Fatty infiltration of the liver. *Dilatation of the CBD measuring 11.3 mm. Gallbladder appears unremarkable. In addition, there appears to be a cystic lesion involving the head of the pancreas measuring 2.4 x 2.2 x 1.5 cm. Further evaluation with MRI/MRCP with and without IV contrast is recommended. Differential considerations includes small pseudocyst, duodenal diverticulum or possibly pancreatic cystic neoplasm. Findings were not present on the 07/15/2019 study. Xr Chest Portable    Result Date: 10/17/2020  Mild interval improvement in scattered pulmonary opacities. Decrease in size of pleural effusions, now minimal.     Xr Chest Portable    Result Date: 10/16/2020  Small bilateral pleural effusions. Worsening right lower lobe infiltrate and possibly new left lower lobe infiltrate. Xr Chest Portable    Result Date: 10/14/2020  Small right pleural effusion. Linear opacity in the right lung base, atelectasis versus pneumonia. Mri Abdomen W Wo Contrast Mrcp    Result Date: 10/19/2020  1.  Cystic lesion at the pancreas may reflect periampullary duodenal diverticulum or pancreatic pseudocyst.  Correlative CT abdomen with IV and oral contrast recommended. 2. A number of the MR series are degraded by breathing motion, blurring detail. 3. Nonspecific dilated common bile duct without stricture or stone evident. This appears to drain at the possible duodenal diverticulum, or may be mildly dilated because of extrinsic compression by pancreatic pseudocyst. 4. Hepatic steatosis and mild hepatomegaly. 5. Small volume bilateral pleural effusion with bibasilar consolidation. Pneumonia is a consideration. RECOMMENDATIONS: CT abdomen with IV and oral contrast         Assessment:        Principal Problem:    COPD exacerbation (HCC)  Active Problems:    Essential hypertension    Depression    Hypokalemia    Macrocytic anemia    Hypomagnesemia    Ambulatory dysfunction    Seizures (HCC)    Alcohol withdrawal syndrome without complication (HCC)    Paresthesia of lower extremity    Acute respiratory failure with hypoxia (HCC)    Pancreatic cyst    Recurrent major depressive disorder (HCC)    Elevated CA 19-9 level    Acute alcoholic intoxication without complication (HCC)  Resolved Problems:    * No resolved hospital problems.  *      Plan:        Encourage compliance to oxygen and BiPAP  Encourage compliance to PT  Respiratory Therapy and Bronchodilators prn  Pulmonary evaluation and f/u as scheduled   Observe for further DTs  Thiamine  Ativan  Psych evaluation  and follow-up as scheduled  Anemia w/u on outpatient basis is suggested    Correct electrolyte abnormalities  Physical therapy and rehabilitation    Seizure precautions  Aspiration precautions  GI evaluation and follow-up  Colonoscopy   Wound care: Buttock wound  , Hx of LVSD, outpatient echo   The patient now agrees to skilled nursing facility  Home oxygen evaluation  Discharge planning initiated  Awaiting precertification  Complete work-up on outpatient basis  Will discharge when arrangements complete and ok with other services.   Follow-up with PCP in one week, LIZBETH CAIN MD  Notify PCP of discharge     IP CONSULT TO HOSPITALIST  IP CONSULT TO SOCIAL WORK  IP CONSULT TO PSYCHIATRY  IP CONSULT TO PULMONOLOGY  IP CONSULT TO [place]  IP CONSULT TO GI  IP CONSULT TO IV TEAM  IP CONSULT TO Yusuf Ordoñez DO  10/22/2020  5:08 PM

## 2020-10-22 NOTE — ANESTHESIA POSTPROCEDURE EVALUATION
Department of Anesthesiology  Postprocedure Note    Patient: Vickey Vann  MRN: [id number]  YOB: 1970  Date of evaluation: 10/22/2020  Time:  2:06 PM     Procedure Summary     Date:  10/22/20 Room / Location:  [address]    Anesthesia Start:  0880 Anesthesia Stop:  2478    Procedures:       EGD BIOPSY (N/A )      COLONOSCOPY DIAGNOSTIC (N/A ) Diagnosis:  (ANEMIA)    Surgeon:  Rhoda Apley, MD Responsible Provider:  Driss Amor MD    Anesthesia Type:  MAC ASA Status:  3          Anesthesia Type: MAC    Marcell Phase I: Marcell Score: 9    Marcell Phase II: Marcell Score: 8    Last vitals: Reviewed and per EMR flowsheets.        Anesthesia Post Evaluation    Patient location during evaluation: PACU  Patient participation: complete - patient participated  Level of consciousness: awake  Pain score: 3  Airway patency: patent  Nausea & Vomiting: no vomiting and no nausea  Complications: no  Cardiovascular status: blood pressure returned to baseline  Respiratory status: acceptable  Hydration status: euvolemic

## 2020-10-23 PROBLEM — A63.0 CONDYLOMA: Status: ACTIVE | Noted: 2020-10-23

## 2020-10-23 PROBLEM — C71.9 ASTROCYTOMA (HCC): Status: ACTIVE | Noted: 2020-10-23

## 2020-10-23 PROBLEM — N90.89 PERINEAL MASS, FEMALE: Status: ACTIVE | Noted: 2020-10-23

## 2020-10-23 PROBLEM — B37.81 CANDIDA ESOPHAGITIS (HCC): Status: ACTIVE | Noted: 2020-10-23

## 2020-10-23 PROBLEM — F10.20 ALCOHOLISM (HCC): Status: ACTIVE | Noted: 2020-10-23

## 2020-10-23 LAB
ABSOLUTE EOS #: 0 K/UL (ref 0–0.44)
ABSOLUTE IMMATURE GRANULOCYTE: 0 K/UL (ref 0–0.3)
ABSOLUTE LYMPH #: 0.67 K/UL (ref 1.1–3.7)
ABSOLUTE MONO #: 0.5 K/UL (ref 0.1–1.2)
ALBUMIN SERPL-MCNC: 1.8 G/DL (ref 3.5–5.2)
ALBUMIN/GLOBULIN RATIO: 0.6 (ref 1–2.5)
ALP BLD-CCNC: 212 U/L (ref 35–104)
ALT SERPL-CCNC: 22 U/L (ref 5–33)
ANION GAP SERPL CALCULATED.3IONS-SCNC: 11 MMOL/L (ref 9–17)
AST SERPL-CCNC: 42 U/L
BASOPHILS # BLD: 0 % (ref 0–2)
BASOPHILS ABSOLUTE: 0 K/UL (ref 0–0.2)
BILIRUB SERPL-MCNC: 0.57 MG/DL (ref 0.3–1.2)
BUN BLDV-MCNC: 6 MG/DL (ref 6–20)
BUN/CREAT BLD: ABNORMAL (ref 9–20)
CALCIUM SERPL-MCNC: 8.2 MG/DL (ref 8.6–10.4)
CARBAMAZEPINE DATE LAST DOSE: ABNORMAL
CARBAMAZEPINE DOSE AMOUNT: ABNORMAL
CARBAMAZEPINE DOSE TIME: ABNORMAL
CARBAMAZEPINE LEVEL: 3.8 UG/ML (ref 4–12)
CHLORIDE BLD-SCNC: 118 MMOL/L (ref 98–107)
CO2: 17 MMOL/L (ref 20–31)
CREAT SERPL-MCNC: 0.45 MG/DL (ref 0.5–0.9)
DIFFERENTIAL TYPE: ABNORMAL
EOSINOPHILS RELATIVE PERCENT: 0 % (ref 1–4)
GFR AFRICAN AMERICAN: >60 ML/MIN
GFR NON-AFRICAN AMERICAN: >60 ML/MIN
GFR SERPL CREATININE-BSD FRML MDRD: ABNORMAL ML/MIN/{1.73_M2}
GFR SERPL CREATININE-BSD FRML MDRD: ABNORMAL ML/MIN/{1.73_M2}
GLUCOSE BLD-MCNC: 89 MG/DL (ref 70–99)
HCT VFR BLD CALC: 29.6 % (ref 36.3–47.1)
HEMOGLOBIN: 8.5 G/DL (ref 11.9–15.1)
HIV AG/AB: NONREACTIVE
IMMATURE GRANULOCYTES: 0 %
LYMPHOCYTES # BLD: 4 % (ref 24–43)
MCH RBC QN AUTO: 32.8 PG (ref 25.2–33.5)
MCHC RBC AUTO-ENTMCNC: 28.7 G/DL (ref 28.4–34.8)
MCV RBC AUTO: 114.3 FL (ref 82.6–102.9)
MONOCYTES # BLD: 3 % (ref 3–12)
MORPHOLOGY: ABNORMAL
MORPHOLOGY: ABNORMAL
NRBC AUTOMATED: 0 PER 100 WBC
PDW BLD-RTO: 16.8 % (ref 11.8–14.4)
PLATELET # BLD: 552 K/UL (ref 138–453)
PLATELET ESTIMATE: ABNORMAL
PMV BLD AUTO: 11 FL (ref 8.1–13.5)
POTASSIUM SERPL-SCNC: 3.9 MMOL/L (ref 3.7–5.3)
RBC # BLD: 2.59 M/UL (ref 3.95–5.11)
RBC # BLD: ABNORMAL 10*6/UL
SARS-COV-2, RAPID: NOT DETECTED
SARS-COV-2: NORMAL
SARS-COV-2: NORMAL
SEG NEUTROPHILS: 93 % (ref 36–65)
SEGMENTED NEUTROPHILS ABSOLUTE COUNT: 15.53 K/UL (ref 1.5–8.1)
SODIUM BLD-SCNC: 146 MMOL/L (ref 135–144)
SOURCE: NORMAL
T. PALLIDUM, IGG: NONREACTIVE
TOTAL PROTEIN: 4.8 G/DL (ref 6.4–8.3)
WBC # BLD: 16.7 K/UL (ref 3.5–11.3)
WBC # BLD: ABNORMAL 10*3/UL

## 2020-10-23 PROCEDURE — 80156 ASSAY CARBAMAZEPINE TOTAL: CPT

## 2020-10-23 PROCEDURE — 6370000000 HC RX 637 (ALT 250 FOR IP): Performed by: INTERNAL MEDICINE

## 2020-10-23 PROCEDURE — 1200000000 HC SEMI PRIVATE

## 2020-10-23 PROCEDURE — 86780 TREPONEMA PALLIDUM: CPT

## 2020-10-23 PROCEDURE — 80053 COMPREHEN METABOLIC PANEL: CPT

## 2020-10-23 PROCEDURE — 97530 THERAPEUTIC ACTIVITIES: CPT

## 2020-10-23 PROCEDURE — 80157 ASSAY CARBAMAZEPINE FREE: CPT

## 2020-10-23 PROCEDURE — 36415 COLL VENOUS BLD VENIPUNCTURE: CPT

## 2020-10-23 PROCEDURE — 2580000003 HC RX 258: Performed by: INTERNAL MEDICINE

## 2020-10-23 PROCEDURE — 99231 SBSQ HOSP IP/OBS SF/LOW 25: CPT | Performed by: INTERNAL MEDICINE

## 2020-10-23 PROCEDURE — 97535 SELF CARE MNGMENT TRAINING: CPT

## 2020-10-23 PROCEDURE — APPNB30 APP NON BILLABLE TIME 0-30 MINS: Performed by: NURSE PRACTITIONER

## 2020-10-23 PROCEDURE — 99232 SBSQ HOSP IP/OBS MODERATE 35: CPT | Performed by: INTERNAL MEDICINE

## 2020-10-23 PROCEDURE — 94761 N-INVAS EAR/PLS OXIMETRY MLT: CPT

## 2020-10-23 PROCEDURE — 99222 1ST HOSP IP/OBS MODERATE 55: CPT | Performed by: PSYCHIATRY & NEUROLOGY

## 2020-10-23 PROCEDURE — 94660 CPAP INITIATION&MGMT: CPT

## 2020-10-23 PROCEDURE — 2700000000 HC OXYGEN THERAPY PER DAY

## 2020-10-23 PROCEDURE — 85025 COMPLETE CBC W/AUTO DIFF WBC: CPT

## 2020-10-23 PROCEDURE — 94640 AIRWAY INHALATION TREATMENT: CPT

## 2020-10-23 PROCEDURE — 87389 HIV-1 AG W/HIV-1&-2 AB AG IA: CPT

## 2020-10-23 PROCEDURE — U0002 COVID-19 LAB TEST NON-CDC: HCPCS

## 2020-10-23 PROCEDURE — 95819 EEG AWAKE AND ASLEEP: CPT

## 2020-10-23 RX ORDER — FLUCONAZOLE 200 MG/1
200 TABLET ORAL DAILY
Status: DISCONTINUED | OUTPATIENT
Start: 2020-10-23 | End: 2020-10-26 | Stop reason: HOSPADM

## 2020-10-23 RX ADMIN — CHLORDIAZEPOXIDE HYDROCHLORIDE 10 MG: 5 CAPSULE ORAL at 09:56

## 2020-10-23 RX ADMIN — POTASSIUM CHLORIDE 40 MEQ: 1500 TABLET, EXTENDED RELEASE ORAL at 09:55

## 2020-10-23 RX ADMIN — FLUCONAZOLE 200 MG: 200 TABLET ORAL at 14:01

## 2020-10-23 RX ADMIN — AMOXICILLIN AND CLAVULANATE POTASSIUM 1 TABLET: 500; 125 TABLET, FILM COATED ORAL at 09:56

## 2020-10-23 RX ADMIN — BUSPIRONE HYDROCHLORIDE 15 MG: 15 TABLET ORAL at 14:01

## 2020-10-23 RX ADMIN — ACETAMINOPHEN 650 MG: 325 TABLET ORAL at 06:53

## 2020-10-23 RX ADMIN — IPRATROPIUM BROMIDE AND ALBUTEROL SULFATE 1 AMPULE: .5; 3 SOLUTION RESPIRATORY (INHALATION) at 19:44

## 2020-10-23 RX ADMIN — DULOXETINE HYDROCHLORIDE 60 MG: 30 CAPSULE, DELAYED RELEASE ORAL at 09:56

## 2020-10-23 RX ADMIN — Medication 100 MG: at 09:56

## 2020-10-23 RX ADMIN — CARBAMAZEPINE 200 MG: 100 TABLET, EXTENDED RELEASE ORAL at 14:01

## 2020-10-23 RX ADMIN — HYDROXYZINE HYDROCHLORIDE 50 MG: 25 TABLET, FILM COATED ORAL at 20:21

## 2020-10-23 RX ADMIN — Medication 54 MG: at 20:22

## 2020-10-23 RX ADMIN — BUSPIRONE HYDROCHLORIDE 15 MG: 15 TABLET ORAL at 02:58

## 2020-10-23 RX ADMIN — SODIUM CHLORIDE, PRESERVATIVE FREE 10 ML: 5 INJECTION INTRAVENOUS at 23:26

## 2020-10-23 RX ADMIN — BUSPIRONE HYDROCHLORIDE 15 MG: 15 TABLET ORAL at 20:22

## 2020-10-23 RX ADMIN — TRAZODONE HYDROCHLORIDE 100 MG: 100 TABLET ORAL at 20:21

## 2020-10-23 RX ADMIN — IPRATROPIUM BROMIDE AND ALBUTEROL SULFATE 1 AMPULE: .5; 3 SOLUTION RESPIRATORY (INHALATION) at 16:16

## 2020-10-23 RX ADMIN — GABAPENTIN 200 MG: 100 CAPSULE ORAL at 14:01

## 2020-10-23 RX ADMIN — CHLORDIAZEPOXIDE HYDROCHLORIDE 10 MG: 5 CAPSULE ORAL at 20:28

## 2020-10-23 RX ADMIN — FAMOTIDINE 20 MG: 20 TABLET ORAL at 20:22

## 2020-10-23 RX ADMIN — CHLORDIAZEPOXIDE HYDROCHLORIDE 10 MG: 5 CAPSULE ORAL at 14:01

## 2020-10-23 RX ADMIN — PREDNISONE 30 MG: 20 TABLET ORAL at 09:54

## 2020-10-23 RX ADMIN — ROPINIROLE HYDROCHLORIDE 1 MG: 1 TABLET, FILM COATED ORAL at 20:21

## 2020-10-23 RX ADMIN — Medication 54 MG: at 09:54

## 2020-10-23 RX ADMIN — CARBAMAZEPINE 200 MG: 100 TABLET, EXTENDED RELEASE ORAL at 09:56

## 2020-10-23 RX ADMIN — GABAPENTIN 200 MG: 100 CAPSULE ORAL at 20:21

## 2020-10-23 RX ADMIN — BUSPIRONE HYDROCHLORIDE 15 MG: 15 TABLET ORAL at 09:56

## 2020-10-23 RX ADMIN — Medication 54 MG: at 14:01

## 2020-10-23 RX ADMIN — FERROUS SULFATE TAB EC 325 MG (65 MG FE EQUIVALENT) 325 MG: 325 (65 FE) TABLET DELAYED RESPONSE at 20:22

## 2020-10-23 RX ADMIN — PROPRANOLOL HYDROCHLORIDE 40 MG: 40 TABLET ORAL at 20:21

## 2020-10-23 RX ADMIN — FERROUS SULFATE TAB EC 325 MG (65 MG FE EQUIVALENT) 325 MG: 325 (65 FE) TABLET DELAYED RESPONSE at 09:56

## 2020-10-23 RX ADMIN — GABAPENTIN 200 MG: 100 CAPSULE ORAL at 09:55

## 2020-10-23 RX ADMIN — FAMOTIDINE 20 MG: 20 TABLET ORAL at 09:56

## 2020-10-23 RX ADMIN — IPRATROPIUM BROMIDE AND ALBUTEROL SULFATE 1 AMPULE: .5; 3 SOLUTION RESPIRATORY (INHALATION) at 07:45

## 2020-10-23 RX ADMIN — SODIUM CHLORIDE, PRESERVATIVE FREE 10 ML: 5 INJECTION INTRAVENOUS at 09:57

## 2020-10-23 RX ADMIN — FOLIC ACID 1 MG: 1 TABLET ORAL at 09:55

## 2020-10-23 RX ADMIN — HYDROXYZINE HYDROCHLORIDE 50 MG: 25 TABLET, FILM COATED ORAL at 14:01

## 2020-10-23 RX ADMIN — THERA TABS 1 TABLET: TAB at 09:55

## 2020-10-23 RX ADMIN — CARBAMAZEPINE 200 MG: 100 TABLET, EXTENDED RELEASE ORAL at 20:22

## 2020-10-23 RX ADMIN — AMOXICILLIN AND CLAVULANATE POTASSIUM 1 TABLET: 500; 125 TABLET, FILM COATED ORAL at 16:31

## 2020-10-23 RX ADMIN — HYDROXYZINE HYDROCHLORIDE 50 MG: 25 TABLET, FILM COATED ORAL at 09:55

## 2020-10-23 RX ADMIN — BUDESONIDE AND FORMOTEROL FUMARATE DIHYDRATE 2 PUFF: 160; 4.5 AEROSOL RESPIRATORY (INHALATION) at 07:47

## 2020-10-23 RX ADMIN — AMOXICILLIN AND CLAVULANATE POTASSIUM 1 TABLET: 500; 125 TABLET, FILM COATED ORAL at 02:58

## 2020-10-23 ASSESSMENT — ENCOUNTER SYMPTOMS
VOMITING: 0
DIARRHEA: 1
ABDOMINAL PAIN: 0
SHORTNESS OF BREATH: 0
NAUSEA: 0
COUGH: 0

## 2020-10-23 ASSESSMENT — PAIN DESCRIPTION - PROGRESSION
CLINICAL_PROGRESSION: NOT CHANGED

## 2020-10-23 ASSESSMENT — PAIN SCALES - WONG BAKER
WONGBAKER_NUMERICALRESPONSE: 0

## 2020-10-23 ASSESSMENT — PAIN DESCRIPTION - ORIENTATION: ORIENTATION: LEFT;RIGHT

## 2020-10-23 ASSESSMENT — PAIN DESCRIPTION - ONSET: ONSET: ON-GOING

## 2020-10-23 ASSESSMENT — PAIN SCALES - GENERAL
PAINLEVEL_OUTOF10: 5
PAINLEVEL_OUTOF10: 7
PAINLEVEL_OUTOF10: 0
PAINLEVEL_OUTOF10: 0

## 2020-10-23 ASSESSMENT — PAIN DESCRIPTION - LOCATION
LOCATION: LEG
LOCATION: FOOT;LEG

## 2020-10-23 ASSESSMENT — PAIN DESCRIPTION - DESCRIPTORS: DESCRIPTORS: ACHING;BURNING

## 2020-10-23 ASSESSMENT — PAIN DESCRIPTION - FREQUENCY: FREQUENCY: CONTINUOUS

## 2020-10-23 ASSESSMENT — PAIN - FUNCTIONAL ASSESSMENT: PAIN_FUNCTIONAL_ASSESSMENT: ACTIVITIES ARE NOT PREVENTED

## 2020-10-23 NOTE — DISCHARGE INSTR - DIET

## 2020-10-23 NOTE — PLAN OF CARE
BRONCHOSPASM/BRONCHOCONSTRICTION     [x]         IMPROVE AERATION/BREATH SOUNDS  [x]   ADMINISTER BRONCHODILATOR THERAPY AS APPROPRIATE  [x]   ASSESS BREATH SOUNDS  [x]   IMPLEMENT AEROSOL/MDI PROTOCOL  [x]   PATIENT EDUCATION AS NEEDED     PROVIDE ADEQUATE OXYGENATION WITH ACCEPTABLE SP02/ABG'S    [x]  IDENTIFY APPROPRIATE OXYGEN THERAPY  [x]   MONITOR SP02/ABG'S AS NEEDED   [x]   PATIENT EDUCATION AS NEEDED     NONINVASIVE VENTILATION    PROVIDE OPTIMAL VENTILATION/ACCEPTABLE SPO2   IMPLEMENT NONINVASIVE VENTILATION PROTOCOL   MAINTAIN ACCEPTABLE SPO2   ASSESS SKIN INTEGRITY/BREAKDOWN SCORE   PATIENT EDUCATION AS NEEDED   BIPAP AS NEEDED

## 2020-10-23 NOTE — PROGRESS NOTES
Restriction  Other position/activity restrictions: pt on 02 n/c 4 L  Subjective   General  Family / Caregiver Present: Yes  Subjective  Subjective: RN and pt agreeable to PT. Pt alert in bed upon arrival. Pericare change needed requiring maxA for bed mobility rolling, RN came to assist.  Pain Screening  Patient Currently in Pain: Yes  Pain Assessment  Pain Assessment: 0-10  Pain Level: 7  Pain Location: Foot;Leg  Non-Pharmaceutical Pain Intervention(s): Ambulation/Increased Activity; Distraction;Repositioned  Response to Pain Intervention: Patient Satisfied  Vital Signs  Patient Currently in Pain: Yes       Orientation     Cognition      Objective   Bed mobility  Bridging: Maximum assistance  Rolling to Left: Maximum assistance  Rolling to Right: Maximum assistance  Supine to Sit: Moderate assistance  Sit to Supine: Maximum assistance  Scooting: Moderate assistance  Transfers  Sit to Stand: Maximum Assistance; Moderate Assistance(Increasing assistance level with repetition as pt fatigues)  Stand to sit: Moderate Assistance  Ambulation  Ambulation?: No  Stairs/Curb  Stairs?: No        Exercises  Comments: STS x2 at bed maxA; STS x2 face to face maxA; Pt needed assistance for hip extension upon standing. Pts knees needed to be blocked in order to prevent buckling. Pt quickly fatigued after 4th repetition.                      Goals  Short term goals  Time Frame for Short term goals: 14 visits  Short term goal 1: Pt will be [place] bed mobility  Short term goal 2: Pt will be [place] transfers  Short term goal 3: Pt will be [place] amb 300' RW  Short term goal 4: Pt will navigate 2 steps [place]    Plan    Plan  Times per week: 5-6x/wk  Current Treatment Recommendations: Strengthening, Functional Mobility Training, Transfer Training, Balance Training, Endurance Training, Gait Training, Stair training, Home Exercise Program, Safety Education & Training, Patient/Caregiver Education & Training, Equipment Evaluation, Education, & procurement  Safety Devices  Type of devices: Call light within reach, Nurse notified, Gait belt, Patient at risk for falls, All fall risk precautions in place, Bed alarm in place, Left in bed  Restraints  Initially in place: Yes  Restraints: All 4 bed rails raised     Therapy Time   Individual Concurrent Group Co-treatment   Time In 1328         Time Out 1415         Minutes 47         Timed Code Treatment Minutes: 3 [facility]    This treatment/evaluation completed by signing SPT. Signing PT agrees with treatment and documentation.

## 2020-10-23 NOTE — CONSULTS
General Surgery   Consultation        PATIENT NAME: Anne Terrell OF BIRTH: 1970  MRN: [id number]    ADMISSION DATE: 10/12/2020  7:48 PM     Consulting Physician: Dr. Duyen Dunne Physician: Dr. Felecia Townsend: 10/23/2020    Consult regarding: Condyloma       Cc:     HPI:  The patient is a 45 y.o. female  With a history of COPD and HTN, who was admitted to the hospital 10/13 for hypoxia. During her hospital course work-up, she had a colonoscopy that showed a large lobulated velvety and nodular mucosal growth extending from the anal verge, the gluteal, perineum, and vagina that appeared to be a condyloma; for which colorectal surgery was consulted. Patient states that she has had this growth for several years. She has been asymptomatic with this. Denies any pain or rectal bleeding.     Past Medical History:        Diagnosis Date    Closed fracture of lateral portion of left tibial plateau 5/6/2316    COPD (chronic obstructive pulmonary disease) (HCC)     Depression     bipolar, major depressive disorder, ptsd, anxiety    Hypertension     Pain, joint, ankle and foot        Past Surgical History:        Procedure Laterality Date    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    FRACTURE SURGERY Left 7-3-13    ORIF tibial plateau    FRACTURE SURGERY Right     small finger metacarpal fracture    HYSTERECTOMY  2003       Medications Prior to Admission:   Medications Prior to Admission: tiotropium (SPIRIVA RESPIMAT) 1.25 MCG/ACT AERS inhaler, Inhale 2 puffs into the lungs daily  tiZANidine (ZANAFLEX) 4 MG tablet, Take 1 tablet by mouth every 8 hours as needed (back pain)  potassium chloride (KLOR-CON M) 20 MEQ extended release tablet, Take 1 tablet by mouth daily  magnesium carbonate (MAGONATE) 54 (Mag Equiv) MG/5ML LIQD oral liquid, Take 5 mLs by mouth 3 times daily  ferrous sulfate (IRON 325) 325 (65 Fe) MG tablet, Take 1 tablet by mouth 2 times daily  rOPINIRole (REQUIP) 1 MG tablet, Take 1 tablet by mouth nightly  ACETAMINOPHEN EXTRA STRENGTH 500 MG tablet, Take 500 mg by mouth 3 times daily as needed  ibuprofen (ADVIL;MOTRIN) 800 MG tablet, Take 800 mg by mouth 3 times daily as needed  propranolol (INDERAL) 40 MG tablet, Take 40 mg by mouth 2 times daily  albuterol sulfate HFA (VENTOLIN HFA) 108 (90 Base) MCG/ACT inhaler, Inhale 2 puffs into the lungs 4 times daily as needed for Wheezing  busPIRone (BUSPAR) 15 MG tablet, Take 15 mg by mouth every 6 hours  escitalopram (LEXAPRO) 20 MG tablet, Take 1.5 tablets by mouth daily  traZODone (DESYREL) 100 MG tablet, Take 1 tablet by mouth nightly  carBAMazepine (TEGRETOL XR) 200 MG extended release tablet, Take 1 tablet by mouth 3 times daily  amLODIPine (NORVASC) 5 MG tablet, Take 1 tablet by mouth daily  gabapentin (NEURONTIN) 100 MG capsule, Take 2 capsules by mouth 3 times daily for 180 days. Intended supply: 90 days  hydrOXYzine (ATARAX) 50 MG tablet, Take 1 tablet by mouth 3 times daily  acamprosate (CAMPRAL) 333 MG tablet, Take 2 tablets by mouth 3 times daily  vitamin B-1 (THIAMINE) 100 MG tablet, Take 1 tablet by mouth daily  vitamin D (ERGOCALCIFEROL) 40244 units CAPS capsule, Take 1 capsule by mouth once a week  folic acid (FOLVITE) 1 MG tablet, Take 1 tablet by mouth daily    Allergies:  Patient has no known allergies.     Social History:   Social History     Socioeconomic History    Marital status: Single     Spouse name: Not on file    Number of children: Not on file    Years of education: Not on file    Highest education level: Not on file   Occupational History    Not on file   Social Needs    Financial resource strain: Not on file    Food insecurity     Worry: Not on file     Inability: Not on file    Transportation needs     Medical: Not on file     Non-medical: Not on file   Tobacco Use    Smoking status: Current Every Day Smoker     Packs/day: 1.00     Years: 30.00     Pack years: 30.00     Types: Cigarettes    Smokeless tobacco: Never Used   Substance and Sexual Activity    Alcohol use:  Yes     Alcohol/week: 0.0 standard drinks    Drug use: Yes     Types: Marijuana     Comment: occasional    Sexual activity: Not on file   Lifestyle    Physical activity     Days per week: Not on file     Minutes per session: Not on file    Stress: Not on file   Relationships    Social connections     Talks on phone: Not on file     Gets together: Not on file     Attends Religion service: Not on file     Active member of club or organization: Not on file     Attends meetings of clubs or organizations: Not on file     Relationship status: Not on file    Intimate partner violence     Fear of current or ex partner: Not on file     Emotionally abused: Not on file     Physically abused: Not on file     Forced sexual activity: Not on file   Other Topics Concern    Not on file   Social History Narrative    Not on file       Family History:       Problem Relation Age of Onset    Other Father     Cancer Maternal Grandmother     Heart Disease Paternal Grandmother        Review of Systems:    Gen: No fever or chills  Eyes: No blurry vision or conjunctivitis   ENT: No sinus congestion or sore throat  CV: No chest pain or dyspnea on exertion  Pulm: No cough or shortness of breath  GI: No abdominal pain, nausea or vomiting  Renal: No dysuria or hematuria  Endo: No excessive sweating or cold intolerance  Heme/Onc: No excessive bruising or swollen lymph nodes  Neuro: No difficulty with speech or acute extremity weakness  Skin: No rashes or ulcers  Musculoskeletal: Denies muscle, joint, back pain      PHYSICAL EXAM:    VITALS:  /66   Pulse 84   Temp 100.8 °F (38.2 °C) (Axillary)   Resp 23   Ht 5' 5\" (1.651 m)   Wt 148 lb (67.1 kg)   SpO2 100%   BMI 24.63 kg/m²   INTAKE/OUTPUT:     Intake/Output Summary (Last 24 hours) at 10/23/2020 0936  Last data filed at 10/23/2020 0650  Gross per 24 hour   Intake 440 ml   Output 450 ml   Net -10 ml       CONSTITUTIONAL: awake, alert, cooperative, no apparent distress  HEAD: atraumatic, normocephalic  EYES: sclera clear, EOMI  ENT: ears are symmetric, nares patent, moist mucous membranes  NECK: Supple, symmetrical, trachea midline  LUNGS: no respiratory distress, no audible wheezing  CARDIOVASCULAR: regular rate   ABDOMEN: soft, nontender, nondistended, non-peritoneal on exam  RECTAL: large lobulated nodular condyloma noted from the anal verge into the gluteal, perineal, and vaginal area. No active bleeding or discharge.    MUSCULOSKELETAL: full range of motion noted  NEUROLOGIC: Awake, alert, oriented to name, place and time  EXTREMITIES: no edema, cyanosis or clubbing  SKIN: normal coloration and turgor      CBC with Differential:    Lab Results   Component Value Date    WBC 16.7 10/23/2020    RBC 2.59 10/23/2020    RBC 4.16 04/30/2012    HGB 8.5 10/23/2020    HCT 29.6 10/23/2020     10/23/2020     04/30/2012    .3 10/23/2020    MCH 32.8 10/23/2020    MCHC 28.7 10/23/2020    RDW 16.8 10/23/2020    LYMPHOPCT 4 10/23/2020    MONOPCT 3 10/23/2020    BASOPCT 0 10/23/2020    MONOSABS 0.50 10/23/2020    LYMPHSABS 0.67 10/23/2020    EOSABS 0.00 10/23/2020    BASOSABS 0.00 10/23/2020    DIFFTYPE NOT REPORTED 10/23/2020     CMP:    Lab Results   Component Value Date     10/23/2020    K 3.9 10/23/2020     10/23/2020    CO2 17 10/23/2020    BUN 6 10/23/2020    CREATININE 0.45 10/23/2020    GFRAA >60 10/23/2020    LABGLOM >60 10/23/2020    GLUCOSE 89 10/23/2020    GLUCOSE 92 04/30/2012    PROT 4.8 10/23/2020    LABALBU 1.8 10/23/2020    CALCIUM 8.2 10/23/2020    BILITOT 0.57 10/23/2020    ALKPHOS 212 10/23/2020    AST 42 10/23/2020    ALT 22 10/23/2020             ASSESSMENT   Principal Problem:    COPD exacerbation ([address]  Active Problems:    Essential hypertension    Depression    Hypokalemia    Macrocytic anemia    Hypomagnesemia    Ambulatory dysfunction    Seizures (HCC)    Alcohol withdrawal syndrome without complication (HCC)    Paresthesia of lower extremity    Acute respiratory failure with hypoxia (HCC)    Pancreatic cyst    Recurrent major depressive disorder (HCC)    Elevated CA 19-9 level    Acute alcoholic intoxication without complication (HCC)    Acute alcoholic gastritis without hemorrhage  Resolved Problems:    * No resolved hospital problems. *      45 year old female with condyloma. PLAN    1. No acute surgical intervention at this time. Patient to follow up outpatient with Dr. Kitty Bahena. Patient may schedule for surgical removal of condyloma at her earliest convenience once medically cleared and stable for surgery. Electronically signed by Norma Godinez DO on 10/23/2020 at 9:37 AM  I Dr. Kitty Bahena saw and examined the patient. I have edited the above and agree with the above. Lorene Mercado  Colorectal Surgery

## 2020-10-23 NOTE — PROGRESS NOTES
Occupational Therapy  Facility/Department: [facility]  Daily Treatment Note  NAME: Maxine Blum  : 1970  MRN: [id number]    Date of Service: 10/23/2020    Discharge Recommendations:  Patient would benefit from continued therapy after discharge   Ed on OT services, transfer safety, bed mob, self feeding/grooming, orientation review- fair/poor return        Assessment   Performance deficits / Impairments: Decreased functional mobility ; Decreased ADL status; Decreased strength;Decreased ROM; Decreased safe awareness;Decreased cognition;Decreased endurance;Decreased balance;Decreased high-level IADLs;Decreased fine motor control;Decreased coordination;Decreased posture  Prognosis: Good  REQUIRES OT FOLLOW UP: Yes  Activity Tolerance  Activity Tolerance: Patient limited by fatigue;Treatment limited secondary to decreased cognition  Safety Devices  Safety Devices in place: Yes  Type of devices: All fall risk precautions in place; Bed alarm in place;Call light within reach; Left in bed;Nurse notified         Patient Diagnosis(es): The primary encounter diagnosis was COPD exacerbation ([facility] [address] Diagnoses of Hypoxia and Acute alcoholic intoxication without complication ([address] were also pertinent to this visit. has a past medical history of Closed fracture of lateral portion of left tibial plateau, COPD (chronic obstructive pulmonary disease) ([address] Depression, Hypertension, and Pain, joint, ankle and foot. has a past surgical history that includes Hysterectomy (); Brain tumor excision (); fracture surgery (Left, 7-3-13); and fracture surgery (Right).     Restrictions  Restrictions/Precautions  Restrictions/Precautions: General Precautions, Fall Risk, Up as Tolerated, Seizure  Required Braces or Orthoses?: No  Position Activity Restriction  Other position/activity restrictions: pt on 02 n/c 4 L  Subjective   General  Chart Reviewed: Yes  Patient assessed for rehabilitation services?: Yes  Family / Caregiver Present: No  Vital Signs  Patient Currently in Pain: Denies   Orientation  Orientation  Overall Orientation Status: Impaired  Orientation Level: Oriented to situation;Disoriented to place; Disoriented to time;Disoriented to situation  Objective    Upon arrival pt was seated on the [facility] with STNA and RN needing assistance d/t pt lethargic/drowsy, not assisting with transfers. Increased time given for toilet hygiene, brief mgt, bed mob, and stand pivot transfer. Pt retired to bed and writer placed bed in chair position and turned on all lights to assist pt in staying awake. Pt set up to engage in self feeding but pt demo difficulty with feeding self d/t B UE weakness. ADL  Feeding: Setup;Verbal cueing; Increased time to complete;Bringing food to mouth assist;Scoop assist;Beverage management;Maximum assistance  Grooming: Setup;Verbal cueing; Increased time to complete;Maximum assistance ( wash face/hands )  LE Dressing: Setup;Dependent/Total  Toileting: Setup;Maximum assistance; Increased time to complete        Balance  Sitting Balance: Moderate assistance  Standing Balance: Maximum assistance(+2 person assistance)  Standing Balance  Time: ~ 3 min  Activity: stood for toilet hygiene, BSC stand pivot transfer to the bed  Comment: pt very lethargic and drowsy, not assisting in transfers. Toilet Transfers  Toilet - Technique: Stand pivot  Equipment Used: Standard bedside commode  Toilet Transfer: 2 Person assistance;Maximum assistance  Toilet Transfers Comments: pt dependent for toilet hygiene. Writer and RN provided transfer assistance while STNA provided toilet hygiene  Bed mobility  Rolling to Left: Maximum assistance  Rolling to Right: Maximum assistance  Sit to Supine: Dependent/Total;2 Person assistance  Comment: pt c/o of feeling weak and was trying to assist in transfers.   Transfers  Stand Pivot Transfers: 2 Person assistance;Maximum assistance  Sit to stand: 2 Person assistance;Maximum assistance  Stand to sit: Maximum assistance;2 Person assistance  Attendance  Participation: Active participation   Cognition  Overall Cognitive Status: Exceptions  Arousal/Alertness:delayed responses to stimuli  Following Commands:  Follows 1 step commands with increased time and cues to follow through  Attention Span: Attends with cues to redirect  Safety Judgement: Decreased awareness of need for assistance;Decreased awareness of need for safety  Problem Solving: Decreased awareness of errors;Assistance required to identify errors made;Assistance required to correct errors made  Insights: Decreased awareness of deficits  Initiation: Requires cues for some  Sequencing: Requires cues for some        Plan   Plan  Times per week: 3-5 x/wk  Current Treatment Recommendations: Balance Training, Functional Mobility Training, Endurance Training, Safety Education & Training, Self-Care / ADL, Equipment Evaluation, Education, & procurement, Patient/Caregiver Education & Training  G  Goals  Short term goals  Time Frame for Short term goals: By discharge, pt will:  Short term goal 1: Demo Mod I with use of LRD for functional transfers and functional mobility  Short term goal 2: Demo I with setup for UB ADLs and grooming tasks  Short term goal 3: Demo I with setup for LB ADLs and toileting tasks  Short term goal 4: Demo 10+ minutes dynamic standing task to increase safety and independence with ADLs/IADLs  Short term goal 5: Demo good safety awareness throughout therapy session with <4 VCs       Therapy Time   Individual Concurrent Group Co-treatment   Time In  8:30         Time Out  9:00         Minutes  30             time code min: [address], DUMONT/L
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
## 2020-10-23 NOTE — PROGRESS NOTES
THE [facility] AT [facility]   Progress Note    Ernesto Brambila is a 45 y.o. female patient. Hospitalization Day:11      Chief consult reason:   GIB    Subjective:  Pt seen and examined. Pt resting comfortably in bed  No bleeding overnight. Hgb stable  Leukocytosis slightly worse 16.7 today  Pt had EGD completed yesterday that showed gastritis and possible esophagitis-bx taken and pending  Colonoscopy showed large extremal condyloma-very poor prep    10/22/2020 EGD per Dr. Myrtle Lancaster:  PREOPERATIVE DIAGNOSIS: Anemia, obscure occult.      PROCEDURES:   1) Transoral Upper Endoscopy with cold biopsy of the stomach and esophagus.      10/22/2020 Colonoscopy per Dr. Myrtle Lancaster:  POSTOPERATIVE DIAGNOSIS:      1) Large amount of residual gastric food, partially digested, residual medication identified in the stomach body. Limited visualization. 2) Mild non-specific gastritis s/p cold biopsy. 3) Thick white plaque coating of the esophagus in the distal portion, possible food debri vs contrast vs candida. Cold biopsy taken. 4) Limited exam  VITALS:  /66   Pulse 84   Temp 100.8 °F (38.2 °C) (Axillary)   Resp 23   Ht 5' 5\" (1.651 m)   Wt 148 lb (67.1 kg)   SpO2 100%   BMI 24.63 kg/m²   TEMPERATURE:  Current - Temp: 100.8 °F (38.2 °C);  Max - Temp  Av.1 °F (36.7 °C)  Min: 96.6 °F (35.9 °C)  Max: 100.8 °F (38.2 °C)    Physical Assessment:  General appearance:  restless  Mental Status:  oriented to person, place   Lungs:  clear to auscultation bilaterally, normal effort  Heart:  regular rate and rhythm, no murmur  Abdomen:  soft, nontender, nondistended, normal bowel sounds, no masses, hepatomegaly, splenomegaly  Extremities:  no edema, redness, tenderness in the calves  Skin:  no gross lesions, rashes, induration    Data Review:    Labs and Imaging:     CBC:  Recent Labs     10/21/20  0532 10/21/20  0644 10/21/20  1404 10/22/20  0619 10/23/20  0723   WBC 10.3  --   --  11.4* 16.7*   HGB 8.5* 8.9* 9. 0* 8.9* 8.5*   .5*  --   --  120.4* 114.3*   RDW 16.7*  --   --  16.8* 16.8*     --   --  308 552*       ANEMIA STUDIES:  No results for input(s): LABIRON, TIBC, FERRITIN, [id number], FOLATE, OCCULTBLD in the last 72 hours. BMP:  Recent Labs     10/21/20  0532 10/22/20  0940    142   K 3.3* 3.7   * 114*   CO2 21 17*   BUN 4* 4*   CREATININE 0.20* 0.39*   GLUCOSE 99 96   CALCIUM 7.8* 8.3*       LFTS:  Recent Labs     10/21/20  0532 10/22/20  0940   ALKPHOS 245* 262*   ALT 23 31   AST 66* 63*   BILITOT 0.39 0.47   LABALBU 1.8* 2.0*       Amylase/Lipase and Ammonia:  Recent Labs     10/21/20  0532   AMYLASE 34   LIPASE 76*       Acute Hepatitis Panel:  Lab Results   Component Value Date    HEPBSAG NONREACTIVE 07/14/2019    HEPCAB NONREACTIVE 07/14/2019    HEPBIGM NONREACTIVE 07/14/2019    HEPAIGM NONREACTIVE 07/14/2019       HCV Genotype:  No results found for: HEPATITISCGENOTYPE    HCV Quantitative:  No results found for: HCVQNT    LIVER WORK UP:    AFP  Lab Results   Component Value Date    AFP 5.1 10/19/2020       Alpha 1 antitrypsin   No results found for: A1A    Anti - Liver/Kidney Ab  No results found for: LIVER-KIDNEYMICROSOMALAB    BRADLEY  No results found for: BRADLEY    AMA  No results found for: MITOAB    ASMA  No results found for: SMOOTHMUSCAB    Ceruloplasmin  No results found for: CERULOPLSM    Celiac panel  No results found for: TISSTRNTIIGG, TTGIGA, IGA    PT/INR  No results for input(s): PROTIME, INR in the last 72 hours. Cancer Markers:  CEA:  No results for input(s): CEA in the last 72 hours. Ca 125:  No results for input(s):  in the last 72 hours. Ca 19-9:   Invalid input(s):   AFP:   No results for input(s): AFP in the last 72 hours. Lactic acid:Invalid input(s): LACTIC ACID    Radiology Review:    Us Liver    Result Date: 10/19/2020  EXAMINATION: RIGHT UPPER QUADRANT ULTRASOUND 10/19/2020 9:19 am COMPARISON: Liver ultrasound July 15, 2019.  HISTORY: ORDERING SYSTEM PROVIDED HISTORY: hx of fatty liver and hepatomegaly TECHNOLOGIST PROVIDED HISTORY: hx of fatty liver and hepatomegaly FINDINGS: LIVER:  Diffuse fatty infiltration. No focal mass or intrahepatic bile duct dilatation. Hepatopetal flow seen within the portal vein. BILIARY SYSTEM:  Gallbladder is unremarkable without evidence of pericholecystic fluid, wall thickening or stones. Negative sonographic Choi's sign. Common bile duct is dilated measuring 11.3 mm. RIGHT KIDNEY: The right kidney is grossly unremarkable without evidence of hydronephrosis. PANCREAS:  Cystic lesion involving the head of the pancreas measuring 2.4 x 2.2 x 1.5 cm. OTHER: No evidence of right upper quadrant ascites. *Fatty infiltration of the liver. *Dilatation of the CBD measuring 11.3 mm. Gallbladder appears unremarkable. In addition, there appears to be a cystic lesion involving the head of the pancreas measuring 2.4 x 2.2 x 1.5 cm. Further evaluation with MRI/MRCP with and without IV contrast is recommended. Differential considerations includes small pseudocyst, duodenal diverticulum or possibly pancreatic cystic neoplasm. Findings were not present on the 07/15/2019 study.      10/20/2020 CT A/P:  Impression    1.  Pancreatic head cyst is demonstrated, which may represent a pseudocyst or    possibly side branch IPMN.  No suspicious features were demonstrated within    this lesion on recent MRI.  This finding may resulting compressive affects on    the adjacent bile duct, as described on recent MRI.  As such, short-term    imaging follow-up in 3-6 months is suggested.         2.  Mild peripancreatic edema suggestive of mild acute pancreatitis.         3.  Pleural effusions and dependent airspace disease, favoring atelectasis.         4.  Bilateral mild hydroureteronephrosis to the level of the bladder, which    is distended.  This may be due to a functional versus mechanical bladder    outlet obstruction and resulting hydronephrosis.  Punctate left    nephrolithiasis is noted without stone in either ureter or the bladder.         5.  Findings consistent with hepatic steatosis.         6.  Mild edematous appearance of the distal sigmoid and rectum, which may be    accentuated by underdistention.  Possibility of colitis should be considered.         7.  Chronic appearing T11 compression deformity. Principal Problem:    COPD exacerbation ([address]  Active Problems:    Essential hypertension    Depression    Hypokalemia    Macrocytic anemia    Hypomagnesemia    Ambulatory dysfunction    Seizures (HCC)    Alcohol withdrawal syndrome without complication (HCC)    Paresthesia of lower extremity    Acute respiratory failure with hypoxia (HCC)    Pancreatic cyst    Recurrent major depressive disorder (HCC)    Elevated CA 19-9 level    Acute alcoholic intoxication without complication (HCC)    Acute alcoholic gastritis without hemorrhage  Resolved Problems:    * No resolved hospital problems. *       GI Assessment:  1. Anemia with occult positive stool-no active bleeding     2. Alcohol use disorder -consuming 1 pint of rum daily.  - Having Alcohol withdrawal tremor      3. COPD exacerbation - Current Tobacco use   - On BiPap PRN     4. HX of hepatomegaly with fatty liver     5. Elevated LFTs - Primarily alk phos-improving     6. Abnormal US liver - Dilated CBD with cystic lesion of pancreatic head      7. Abnormal MRI/MRCP - Pancreatic lesion - ? Pancreatic pseudocyst versus periepilator duodenal diverticulum. - Ct A/P shows  Pancreatic head cyst which may be pseudocyst or branch IPMN, mild peripancreatic edema suggestive of mild acute pancreatitis, pleural effusions, hepatic steatosis, edematous sigmoid and rectum-possible colitis  CA 19-9: 123    Plan and Recommendations:      Diet as tolerated    Rec CBC, BMP, LFT's, INR daily    Monitor for active bleeding.  Transfuse to maintain Hg greater than 7    Cont medical mgt    Pt may need OP EUS to further assess Pancrease given imaging findings and elevate CA 19-9    Appreciate recs of colorectal surgery    7. GI will s/o. Pt to follow up as OP     Thank you for allowing me to participate in the care of your patient. Please feel free to contact me with any questions or concerns.      2 [facility]

## 2020-10-23 NOTE — PROGRESS NOTES
[facility]    Office: Catracho Alvarez, DO, Angela Wilmer, DO, Marion Montanez, DO, Rigo Boone Blood, DO, Karson Donovan MD, Lay Marques MD, Lizbeth Aguilar MD, Miguel Barnes MD, Gilberto Worthington MD, Herminia Hamilton MD, Gerson Pitts MD, Melissa Smith MD, Mary Orozco MD, Beto Villela, DO, Nicky Gracia MD, Gilberto Martin MD, Isiah Lim, DO, Jamil William MD,  Angela Xavier DO, Asim Smith MD, Lane Liriano MD, [place] Lisa, [facility], [facility] HospitalMODESTO Fernandes, CNP, Constance Mcgarry, [facility], Bailey Cavazos, CNS, Aleida Mcclain, CNP, Curtis Herrera, CNP, Rigo Lagunas, CNP, Tavo Fleming, CNP, Nikolas Parents, CNP, Kristina Lane PA-C, Lila Santiago, [facility], Celso Alejandre, CNP, Hugo Phoenix, CNP, Tyra Gutiérrez, CNP, Consuelo Aguilar, CNP, Jacklyn Frankel, [address]    Progress Note    10/23/2020    10:24 AM    Name:   Magda Knutson  MRN:     [id number]     [place]:      [de-identified]   Room:   2003/2003-01  IP Day:  6  Admit Date:  10/12/2020  7:48 PM    PCP:   Abdirahman Richards MD  Code Status:  Full Code    Subjective:     C/C:   Chief Complaint   Patient presents with    Shortness of Breath       Interval History Status:    Lying flat   No distress  Appetite fair  Still feels weak  Poor exercise tolerance  Status post EGD and colonoscopy 10/22    T-max 100.8    WBC 16. 7High k/uL RBC 2.59Low m/uL Hemoglobin 8.5Low g/dL Hematocrit 29.6Low % . 3High     Alb 1.8Low     Glucose 89 mg/dL   BUN 6 mg/dL CREATININE 0.45Low mg/dL     Calcium 8.2Low mg/dL   Sodium 146High mmol/L   Potassium 3.9 mmol/L   Chloride 118High mmol/L   CO2 17Low       Brief History:     As documented in the medical record:  \"45year-old brought in by family after being noted to be hypoxic at her PCPs office. Patient does complain of productive cough, bilateral lower extremity painx 2-3 months but worse in the last month.   Chronic unchanged shortness of breath going on for more than a year. Has been using albuterol inhaler as needed. Continues to smoke about 1 pack/day. Drinks alcohol every day: Rum. Known history of hypertension, depression, COPD, excision of Astrocytoma. - ED evaluation showed patient was afebrile, hypoxic to 84% on room air, intoxicated with alcohol level 244, anemic hemoglobin 9.4, COVID rapid NAAT:  Negative, chest x-ray was unremarkable no pneumonia, high sensitive troponin 9, EKG with nonspecific ST-T changes. Lower extremity venous Dopplers negative for DVT. Initial findings and plan have included:  Acute COPD exacerbation- Bipap prn, oxygen, Pulmonary following, on Augmentin, Prednisone  Acute resp failure- Bipap  Etoh abuse- experiencing withdrawal, on Ciwa protocol, Librium  Depression- Psych consult   Pancreatic cyst- dw GI, needs MRI/ MRCP, CA 19-9 slightly elevated, AFP wnl  Tobacco abuse- refusing Nicotine patch, will try lozenges/ gum  Hypokalemia/ Hypomag- replace, schedule KCL, will start MagOx  Gi/ DVT proph\"    Subsequent database has included:  MRCP:  Impression:  1. Cystic lesion at the pancreas may reflect periampullary duodenal   diverticulum or pancreatic pseudocyst.  Correlative CT abdomen with IV and   oral contrast recommended. 2. A number of the MR series are degraded by breathing motion, blurring   detail. 3. Nonspecific dilated common bile duct without stricture or stone evident. This appears to drain at the possible duodenal diverticulum, or may be mildly   dilated because of extrinsic compression by pancreatic pseudocyst.   4. Hepatic steatosis and mild hepatomegaly. 5. Small volume bilateral pleural effusion with bibasilar consolidation. Pneumonia is a consideration. CT abdomen:  Impression:  1. Pancreatic head cyst is demonstrated, which may represent a pseudocyst or   possibly side branch IPMN. No suspicious features were demonstrated within   this lesion on recent MRI.   This finding may resulting compressive affects on   the adjacent bile duct, as described on recent MRI. As such, short-term   imaging follow-up in 3-6 months is suggested. 2.  Mild peripancreatic edema suggestive of mild acute pancreatitis. 3.  Pleural effusions and dependent airspace disease, favoring atelectasis. 4.  Bilateral mild hydroureteronephrosis to the level of the bladder, which   is distended. This may be due to a functional versus mechanical bladder   outlet obstruction and resulting hydronephrosis. Punctate left   nephrolithiasis is noted without stone in either ureter or the bladder. 5.  Findings consistent with hepatic steatosis. 6.  Mild edematous appearance of the distal sigmoid and rectum, which may be   accentuated by underdistention. Possibility of colitis should be considered. 7.  Chronic appearing T11 compression deformity. EGD 10/22:  1) Large amount of residual gastric food, partially digested, residual medication identified in the stomach body. Limited visualization. 2) Mild non-specific gastritis s/p cold biopsy. 3) Thick white plaque coating of the esophagus in the distal portion, possible food debri vs contrast vs candida. Cold biopsy taken. 4) Limited exam    Colonoscopy:  1) Large lobulated velvety and nodular mucosal growth extending from the anal verge into the gluteal, perineum, and vagina that grossly appears to suggest condyloma. 2) 7mm flat polyp at 25 cm and 8mm size flat polyp identified at 20 cm (not removed due to poor prep)   3) No signs of active bleeding observed, brown/green stool through out colon     Reported that she has not been sexually active for years  She is not   The patient has been unaware of the growth involving the perineum  No history of sexually transmitted diseases  Colorectal surgery consulted    Medications:      Allergies:  No Known Allergies    Current Meds:   Scheduled Meds:    predniSONE  30 mg Oral Daily    Followed by   Anil Alfonso ON 10/26/2020] predniSONE  20 mg Oral Daily    Followed by   Cat Peña ON 10/29/2020] predniSONE  10 mg Oral Daily    DULoxetine  60 mg Oral Daily    sodium chloride  30 mL Intravenous Once    potassium chloride  40 mEq Oral Daily    amoxicillin-clavulanate  1 tablet Oral 3 times per day    chlordiazePOXIDE  10 mg Oral TID    ipratropium-albuterol  1 ampule Inhalation Q4H WA    amLODIPine  5 mg Oral Daily    busPIRone  15 mg Oral Q6H    carBAMazepine  200 mg Oral TID    ferrous sulfate  325 mg Oral BID    folic acid  1 mg Oral Daily    gabapentin  200 mg Oral TID    hydrOXYzine  50 mg Oral TID    magnesium carbonate  54 mg Oral TID    propranolol  40 mg Oral BID    rOPINIRole  1 mg Oral Nightly    traZODone  100 mg Oral Nightly    vitamin B-1  100 mg Oral Daily    sodium chloride flush  10 mL Intravenous 2 times per day    famotidine  20 mg Oral BID    [Held by provider] enoxaparin  40 mg Subcutaneous Daily    multivitamin  1 tablet Oral Daily    budesonide-formoterol  2 puff Inhalation BID     Continuous Infusions:    sodium chloride 100 mL/hr at 10/21/20 2309     PRN Meds: metoprolol, potassium chloride **OR** potassium alternative oral replacement **OR** potassium chloride, magnesium sulfate, nicotine polacrilex, nicotine polacrilex, sodium chloride flush, oxyCODONE-acetaminophen, LORazepam, sodium chloride flush, acetaminophen **OR** [DISCONTINUED] acetaminophen, polyethylene glycol, promethazine **OR** ondansetron, nicotine, albuterol    Data:     Past Medical History:   has a past medical history of Closed fracture of lateral portion of left tibial plateau, COPD (chronic obstructive pulmonary disease) ([facility] [address] Depression, Hypertension, and Pain, joint, ankle and foot. Social History:   reports that she has been smoking cigarettes. She has a 30.00 pack-year smoking history. She has never used smokeless tobacco. She reports current alcohol use. She reports current drug use. Drug: Marijuana. Family History:   Family History   Problem Relation Age of Onset    Other Father     Cancer Maternal Grandmother     Heart Disease Paternal Grandmother        Review of Systems:     Review of Systems   Constitutional: Positive for activity change (Decreased), appetite change (Diminished) and fatigue. Negative for chills, diaphoresis and fever. Respiratory: Negative for cough and shortness of breath. Cardiovascular: Negative for chest pain and palpitations. Gastrointestinal: Positive for diarrhea (Diarrhea). Negative for abdominal pain, nausea and vomiting. Genitourinary: Negative for flank pain and hematuria. Skin: Positive for wound (Buttock wound reported). Neurological: Positive for weakness. Negative for tremors. Psychiatric/Behavioral: Positive for decreased concentration and sleep disturbance (Not sleeping well). Physical Examination:        Physical Exam  Vitals signs reviewed. Constitutional:       General: She is not in acute distress. Appearance: She is ill-appearing. She is not diaphoretic. HENT:      Head: Normocephalic. Nose: Nose normal.   Eyes:      General: No scleral icterus. Conjunctiva/sclera: Conjunctivae normal.   Neck:      Musculoskeletal: Neck supple. Trachea: No tracheal deviation. Cardiovascular:      Rate and Rhythm: Normal rate and regular rhythm. Pulmonary:      Effort: Pulmonary effort is normal. No respiratory distress. Breath sounds: Normal breath sounds. No wheezing or rales. Chest:      Chest wall: No tenderness. Abdominal:      General: Bowel sounds are normal. There is no distension. Palpations: Abdomen is soft. Tenderness: There is no abdominal tenderness. Musculoskeletal:         General: No tenderness. Skin:     General: Skin is warm and dry.    Neurological:      Comments: Somnolent  Having some difficulty with historical data   Psychiatric:      Comments: Affect flat  Not very conversant Vitals:  /66   Pulse 84   Temp 100.8 °F (38.2 °C) (Axillary)   Resp 23   Ht 5' 5\" (1.651 m)   Wt 148 lb (67.1 kg)   SpO2 100%   BMI 24.63 kg/m²   Temp (24hrs), Av.4 °F (36.9 °C), Min:96.6 °F (35.9 °C), Max:100.8 °F (38.2 °C)    No results for input(s): POCGLU in the last 72 hours. I/O (24Hr): Intake/Output Summary (Last 24 hours) at 10/23/2020 1024  Last data filed at 10/23/2020 0650  Gross per 24 hour   Intake 440 ml   Output 450 ml   Net -10 ml       Labs:  Hematology:  Recent Labs     10/21/20  0532  10/21/20  1404 10/22/20  0619 10/23/20  0723   WBC 10.3  --   --  11.4* 16.7*   RBC 2.64*  --   --  2.70* 2.59*   HGB 8.5*   < > 9.0* 8.9* 8.5*   HCT 28.9*   < > 29.9* 32.5* 29.6*   .5*  --   --  120.4* 114.3*   MCH 32.2  --   --  33.0 32.8   MCHC 29.4  --   --  27.4* 28.7   RDW 16.7*  --   --  16.8* 16.8*     --   --  308 552*   MPV 11.0  --   --  11.0 11.0    < > = values in this interval not displayed.      Chemistry:  Recent Labs     10/21/20  0532 10/22/20  0940 10/23/20  0723    142 146*   K 3.3* 3.7 3.9   * 114* 118*   CO2 21 17* 17*   GLUCOSE 99 96 89   BUN 4* 4* 6   CREATININE 0.20* 0.39* 0.45*   MG 1.9  --   --    ANIONGAP 9 11 11   LABGLOM >60 >60 >60   GFRAA >60 >60 >60   CALCIUM 7.8* 8.3* 8.2*   CAION 1.09*  --   --      Recent Labs     10/21/20  0532 10/22/20  0940 10/23/20  0723   PROT 4.8* 5.5* 4.8*   LABALBU 1.8* 2.0* 1.8*   AST 66* 63* 42*   ALT 23 31 22   ALKPHOS 245* 262* 212*   BILITOT 0.39 0.47 0.57   AMYLASE 34  --   --    LIPASE 76*  --   --      ABG:  Lab Results   Component Value Date    POCPH 7.452 10/16/2020    POCPCO2 37.0 10/16/2020    POCPO2 80.7 10/16/2020    POCHCO3 25.9 10/16/2020    NBEA NOT REPORTED 10/16/2020    PBEA 2 10/16/2020    PZL8JIC 27 10/16/2020    KCXO2NWB 96 10/16/2020    FIO2 55.0 10/16/2020     Lab Results   Component Value Date/Time    SPECIAL NOT REPORTED 10/13/2020 08:27 AM     Lab Results   Component Value Date/Time    CULTURE NORMAL RESPIRATORY SUSIE MODERATE GROWTH 10/13/2020 08:27 AM       Radiology:  Ct Abdomen Pelvis W Iv Contrast Additional Contrast? Oral    Result Date: 10/20/2020  1. Pancreatic head cyst is demonstrated, which may represent a pseudocyst or possibly side branch IPMN. No suspicious features were demonstrated within this lesion on recent MRI. This finding may resulting compressive affects on the adjacent bile duct, as described on recent MRI. As such, short-term imaging follow-up in 3-6 months is suggested. 2.  Mild peripancreatic edema suggestive of mild acute pancreatitis. 3.  Pleural effusions and dependent airspace disease, favoring atelectasis. 4.  Bilateral mild hydroureteronephrosis to the level of the bladder, which is distended. This may be due to a functional versus mechanical bladder outlet obstruction and resulting hydronephrosis. Punctate left nephrolithiasis is noted without stone in either ureter or the bladder. 5.  Findings consistent with hepatic steatosis. 6.  Mild edematous appearance of the distal sigmoid and rectum, which may be accentuated by underdistention. Possibility of colitis should be considered. 7.  Chronic appearing T11 compression deformity. Us Liver    Result Date: 10/19/2020  *Fatty infiltration of the liver. *Dilatation of the CBD measuring 11.3 mm. Gallbladder appears unremarkable. In addition, there appears to be a cystic lesion involving the head of the pancreas measuring 2.4 x 2.2 x 1.5 cm. Further evaluation with MRI/MRCP with and without IV contrast is recommended. Differential considerations includes small pseudocyst, duodenal diverticulum or possibly pancreatic cystic neoplasm. Findings were not present on the 07/15/2019 study. Xr Chest Portable    Result Date: 10/17/2020  Mild interval improvement in scattered pulmonary opacities.   Decrease in size of pleural effusions, now minimal.     Xr Chest Portable    Result Date: 10/16/2020  Small bilateral pleural effusions. Worsening right lower lobe infiltrate and possibly new left lower lobe infiltrate. Xr Chest Portable    Result Date: 10/14/2020  Small right pleural effusion. Linear opacity in the right lung base, atelectasis versus pneumonia. Mri Abdomen W Wo Contrast Mrcp    Result Date: 10/19/2020  1. Cystic lesion at the pancreas may reflect periampullary duodenal diverticulum or pancreatic pseudocyst.  Correlative CT abdomen with IV and oral contrast recommended. 2. A number of the MR series are degraded by breathing motion, blurring detail. 3. Nonspecific dilated common bile duct without stricture or stone evident. This appears to drain at the possible duodenal diverticulum, or may be mildly dilated because of extrinsic compression by pancreatic pseudocyst. 4. Hepatic steatosis and mild hepatomegaly. 5. Small volume bilateral pleural effusion with bibasilar consolidation. Pneumonia is a consideration. RECOMMENDATIONS: CT abdomen with IV and oral contrast         Assessment:        Principal Problem:    COPD exacerbation (HCC)  Active Problems:    Essential hypertension    Depression    Hypokalemia    Macrocytic anemia    Hypomagnesemia    Ambulatory dysfunction    Seizures (HCC)    Alcohol withdrawal syndrome without complication (HCC)    Paresthesia of lower extremity    Acute respiratory failure with hypoxia (HCC)    Pancreatic cyst    Recurrent major depressive disorder (HCC)    Elevated CA 19-9 level    Acute alcoholic intoxication without complication (HCC)    Acute alcoholic gastritis without hemorrhage  Resolved Problems:    * No resolved hospital problems. *      Plan:        Awaiting biopsies from endoscopy  Diflucan initiated to cover Candida esophagitis  Colorectal surgery for perineal mass, condyloma?   Check VDRL  Check HIV screen  Encourage compliance to oxygen and BiPAP  Encourage compliance to PT  Respiratory Therapy and Bronchodilators prn  Pulmonary evaluation and f/u as scheduled   Observe for further DTs  Thiamine  Ativan  Psych evaluation  and follow-up as scheduled, MDD  Anemia w/u on outpatient basis is suggested    Correct electrolyte abnormalities  Physical therapy and rehabilitation    Seizure precautions  Check Tegretol level  Aspiration precautions  GI evaluation and follow-up  Wound care: Buttock wound  , Hx of LVSD, outpatient echo   The patient now agrees to skilled nursing facility  Home oxygen evaluation  Discharge planning initiated  Awaiting precertification from [facility]   Complete work-up on outpatient basis  Will discharge when arrangements complete and ok with other services.   Follow-up with PCP in one week, LIZBETH CAIN MD  Notify PCP of discharge     IP CONSULT TO HOSPITALIST  IP CONSULT TO SOCIAL WORK  IP CONSULT TO PSYCHIATRY  IP CONSULT TO PULMONOLOGY  IP CONSULT TO [place]  IP CONSULT TO GI  IP CONSULT TO IV TEAM  IP CONSULT TO IV TEAM  IP CONSULT TO [address] Dahl DO  10/23/2020  10:24 AM

## 2020-10-23 NOTE — CONSULTS
NEUROLOGY CONSULT  NOTE    10/23/2020     Reason for Consult:         HPI:  Elijah Dotson is a  45 y.o. female admitted on 10/12/2020 with . The patient was seen and examined and the chart was reviewed. Chaparrita Tirado has a past medical history of COPD and hypertension, she presented to her PCP with S OB and was noted to be hypoxic. She was subsequently sent to the ED. She also has a history of baseline resting tremors along with an astrocytoma resection. Neurology was consulted for concern of change in mental status and ongoing tremors for history of EtOH abuse. Patient is on ropinirole and carbamazepine for resting tremors and seizures respectively. Her previous seizure was in February where she states that she had 3 consecutive seizures but does not recall whether there was any LOC or gaze deviation. She also previously had an EEG which did not show any focal epileptiform discharges. Patient is currently on a Librium protocol.,  The patient is awake, alert and able to follow commands, she does display some hyperreflexia bilaterally in her lower extremities along with decreased sensation in her right lower extremity. Shows an EF of 55%. At baseline, patient is functional but currently is severely decompensated. She normally resides by herself.         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24, the patient underwent 2 surgical resections without known recurrence 10/23/2020    Closed fracture of lateral portion of left tibial plateau 2/1/3860    COPD (chronic obstructive pulmonary disease) (HCC)     Depression     bipolar, major depressive disorder, ptsd, anxiety    Hypertension     Pain, joint, ankle and foot        Past Surgical History:   Procedure Laterality Date    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    COLONOSCOPY N/A 10/22/2020    COLONOSCOPY DIAGNOSTIC performed by Zion Poole MD at [address] [place] [address] 7-3-13 ORIF tibial plateau    FRACTURE SURGERY Right     small finger metacarpal fracture    HYSTERECTOMY  2003    UPPER GASTROINTESTINAL ENDOSCOPY N/A 10/22/2020    EGD BIOPSY performed by Zion Poole MD at [facility]       Social History:  Elijah Dotson  reports that she has been smoking cigarettes. She has a 30.00 pack-year smoking history. She has never used smokeless tobacco. She reports current alcohol use. She reports current drug use. Drug: Marijuana. Family History   Problem Relation Age of Onset    Other Father     Cancer Maternal Grandmother     Heart Disease Paternal Grandmother        Medications:   fluconazole  200 mg Oral Daily    predniSONE  30 mg Oral Daily    Followed by   Coy Mcginnis ON 10/26/2020] predniSONE  20 mg Oral Daily    Followed by   Coy Mcginnis ON 10/29/2020] predniSONE  10 mg Oral Daily    DULoxetine  60 mg Oral Daily    sodium chloride  30 mL Intravenous Once    potassium chloride  40 mEq Oral Daily    amoxicillin-clavulanate  1 tablet Oral 3 times per day    chlordiazePOXIDE  10 mg Oral TID    ipratropium-albuterol  1 ampule Inhalation Q4H WA    amLODIPine  5 mg Oral Daily    busPIRone  15 mg Oral Q6H    carBAMazepine  200 mg Oral TID    ferrous sulfate  325 mg Oral BID    folic acid  1 mg Oral Daily    gabapentin  200 mg Oral TID    hydrOXYzine  50 mg Oral TID    magnesium carbonate  54 mg Oral TID    propranolol  40 mg Oral BID    rOPINIRole  1 mg Oral Nightly    traZODone  100 mg Oral Nightly    vitamin B-1  100 mg Oral Daily    sodium chloride flush  10 mL Intravenous 2 times per day    famotidine  20 mg Oral BID    [Held by provider] enoxaparin  40 mg Subcutaneous Daily    multivitamin  1 tablet Oral Daily    budesonide-formoterol  2 puff Inhalation BID       Allergies:   Elijah Dotson has No Known Allergies.     ROS:   Constitutional Negative for fever and chills   HEENT Negative for ear discharge, ear pain, nosebleed   Eyes Negative for photophobia, pain and discharge   Respiratory Negative for hemoptysis and sputum   Cardiovascular Negative for orthopnea, claudication and PND   Gastrointestinal Negative for abdominal pain, diarrhea, blood in stool   Musculoskeletal Negative for joint pain, negative for myalgia   Skin Negative for rash or itching   hematology Negative for ecchymosis, anemia   Psychiatric Negative for suicidal ideation, anxiety, depression, hallucinations       Objective:   BP (!) 92/58   Pulse 77   Temp 98.4 °F (36.9 °C) (Oral)   Resp 21   Ht 5' 5\" (1.651 m)   Wt 148 lb (67.1 kg)   SpO2 90%   BMI 24.63 kg/m²         General examination:    HEENT: Normocephalic, atraumatic, neck supple (-)nuchal rigidity  Lungs: Respirations unlabored, chest wall no deformity  Heart: Regular rate rhythm  Abdomen: Soft, non distended, non tender  Extremities: No cyanosis, clubbing or edema, 2+ pulses  Skin: Intact, normal skin color, no ecchymosis    Neurological examination:    Mental status   Alert and oriented x 3; following all commands; speech is fluent, no dysarthria, aphasia. Cranial nerves   II - visual fields intact to confrontation; pupils reactive  III, IV, VI - extraocular muscles intact; no CAMMIE; several beats of nystagmus, no ptosis   V - normal facial sensation                                                               VII - normal facial symmetry                                                             VIII - intact hearing                                                                             IX, X - symmetrical palate elevation                                               XI - symmetrical shoulder shrug                                                       XII - midline tongue without atrophy or fasciculation     Motor function  Strength: 4/5 RUE, 4/5 RLE, 4/5 LUE, 4/5  4/5LLE  Normal bulk and tone.    No tremors                      Sensory function decreased sensation to  LT in RLE     Cerebellar  No ataxia or dysmetria   Reflex function 3+ bilateral patellar, 2+/triceps/supinator biceps,  Toes Ongoing   Gait                  Normal station and gait           LABS:    CBC:   Recent Labs     10/21/20  0532  10/21/20  1404 10/22/20  0619 10/23/20  0723   WBC 10.3  --   --  11.4* 16.7*   HGB 8.5*   < > 9.0* 8.9* 8.5*     --   --  308 552*    < > = values in this interval not displayed. BMP:    Recent Labs     10/21/20  0532 10/22/20  0940 10/23/20  0723    142 146*   K 3.3* 3.7 3.9   * 114* 118*   CO2 21 17* 17*   BUN 4* 4* 6   CREATININE 0.20* 0.39* 0.45*   GLUCOSE 99 96 89         Lab Results   Component Value Date    LDLCHOLESTEROL 92 06/18/2019    HDL 40 (L) 06/18/2019    ALT 22 10/23/2020    AST 42 (H) 10/23/2020    TSH 0.69 07/10/2020    INR 1.0 12/14/2019    LABA1C 4.3 07/14/2019    [id number] 1744 (H) 07/10/2020       No results found for: PHENYTOIN, PHENYTOIN, VALPROATE, CBMZ    IMAGING:   CT head - pending    2 D echo - 55%     All of the above medications, clinical laboratory, imaging and other diagnostic tests were reviewed by myself. 46 y/o female PMH of seizure disorder and alcoholism. Presented with exacerbation. Patient also has history of baseline tremor and is currently on Tegretol and ropinirole. Currently she appears severely decompensated, CT head is pending, along with an EEG and a tegretol level.,  Neurologically, the patient is able to follow commands and moving all extremities. Most likely, this is secondary to patient being severely decompensated but will follow-up after additional work-up has been completed      Neurological - history of seizures  -monitor for any neurological changes.   -Routine EEG to rule out subclinical seizures  -CT head to assess for any pathology  -Tegretol level pending    Will follow-up once work-up has been completed, thank you for this consultation.       Electronically signed by Ashley Waters MD on 10/23/2020 at 3:55 PM      [place]

## 2020-10-23 NOTE — FLOWSHEET NOTE
Assessment: Patient and mother requested AD paperwork. During initial conversation, patient stated repeatedly that she was at home (not at the hospital). She said clearly that she wanted her mother to be POA-H if needed, followed by her friend Paloma Boyd and then sister Leif Kaye. She also said that if needed she would want both CPR and Intubation. Intervention:  provided emotional support and basic ACP information. Left AD paperwork for patient to review and complete.  informed patient and family that AD is required to include friend Paloma Boyd as an alternate POA-H, but that her mother as primary would be in effect per [place] laws even without paperwork. Outcome and Plan: Family said that they think patient will be more clear and conversational after some food and a nap. They will call for  to assist in completion/witnessing of AD paperwork. Advance Care Planning     Advance Care Planning Activator (Inpatient)  Conversation Note      Date of ACP Conversation: 10/12/2020    Conversation Conducted with:Patient and patient's mother. ACP Activator: [address] [facility] [facility] Decision Maker:  Ashlee Matta    Current Designated Health Care Decision Maker:   Primary Decision Maker: Steve Blanca - Parent - [phone]    Supplemental (Other) Decision Maker: Hoa Foreman - Brother/Sister - [phone]        Ventilation: \"If you were in your present state of health and suddenly became very ill and were unable to breathe on your own, what would your preference be about the use of a ventilator (breathing machine) if it were available to you? \"      Would the patient desire the use of ventilator (breathing machine)?: yes    \"If your health worsens and it becomes clear that your chance of recovery is unlikely, what would your preference be about the use of a ventilator (breathing machine) if it were available to you? \"     Would the patient desire the use of ventilator (breathing machine)?: Yes      Resuscitation  \"CPR works best to restart the heart when there is a sudden event, like a heart attack, in someone who is otherwise healthy. Unfortunately, CPR does not typically restart the heart for people who have serious health conditions or who are very sick. \"    \"In the event your heart stopped as a result of an underlying serious health condition, would you want attempts to be made to restart your heart (answer \"yes\" for attempt to resuscitate) or would you prefer a natural death (answer \"no\" for do not attempt to resuscitate)? \" yes       [] Yes   [x] No   Educated Patient / Kellee Dotson regarding differences between Advance Directives and portable DNR orders.     Length of ACP Conversation in minutes:  61    Conversation Outcomes:  [x] ACP discussion completed  [] Existing advance directive reviewed with patient; no changes to patient's previously recorded wishes  [] New Advance Directive completed  [] Portable Do Not Rescitate prepared for Provider review and signature  [] POLST/POST/MOLST/MOST prepared for Provider review and signature      Follow-up plan:    [] Schedule follow-up conversation to continue planning  [] Referred individual to Provider for additional questions/concerns   [x] Advised patient/agent/surrogate to review completed ACP document and update if needed with changes in condition, patient preferences or care setting    [] This note routed to one or more involved healthcare providers

## 2020-10-23 NOTE — PLAN OF CARE
Daily  metoprolol (LOPRESSOR) injection 5 mg, Q4H PRN  potassium chloride (KLOR-CON M) extended release tablet 40 mEq, PRN    Or  potassium bicarb-citric acid (EFFER-K) effervescent tablet 40 mEq, PRN    Or  potassium chloride 10 mEq/100 mL IVPB (Peripheral Line), PRN  magnesium sulfate 1 g in dextrose 5% 100 mL IVPB, PRN  DULoxetine (CYMBALTA) extended release capsule 60 mg, Daily  0.9 % sodium chloride infusion, Continuous  nicotine polacrilex (COMMIT) lozenge 2 mg, PRN  nicotine polacrilex (NICORETTE) gum 2 mg, PRN  0.9 % sodium chloride bolus, Once  sodium chloride flush 0.9 % injection 10 mL, PRN  potassium chloride (KLOR-CON M) extended release tablet 40 mEq, Daily  oxyCODONE-acetaminophen (PERCOCET) 5-325 MG per tablet 1 tablet, Q6H PRN  amoxicillin-clavulanate (AUGMENTIN) 500-125 MG per tablet 1 tablet, 3 times per day  chlordiazePOXIDE (LIBRIUM) capsule 10 mg, TID  ipratropium-albuterol (DUONEB) nebulizer solution 1 ampule, Q4H WA  amLODIPine (NORVASC) tablet 5 mg, Daily  busPIRone (BUSPAR) tablet 15 mg, Q6H  carBAMazepine (TEGRETOL XR) extended release tablet 200 mg, TID  ferrous sulfate (FE TABS 325) EC tablet 240 mg, BID  folic acid (FOLVITE) tablet 1 mg, Daily  gabapentin (NEURONTIN) capsule 200 mg, TID  hydrOXYzine (ATARAX) tablet 50 mg, TID  magnesium carbonate (MAGONATE) 54 (Mag Equiv) MG/5ML oral liquid 54 mg, TID  propranolol (INDERAL) tablet 40 mg, BID  rOPINIRole (REQUIP) tablet 1 mg, Nightly  traZODone (DESYREL) tablet 100 mg, Nightly  vitamin B-1 (THIAMINE) tablet 100 mg, Daily  sodium chloride flush 0.9 % injection 10 mL, 2 times per day  sodium chloride flush 0.9 % injection 10 mL, PRN  acetaminophen (TYLENOL) tablet 650 mg, Q6H PRN  polyethylene glycol (GLYCOLAX) packet 17 g, Daily PRN  promethazine (PHENERGAN) tablet 12.5 mg, Q6H PRN    Or  ondansetron (ZOFRAN) injection 4 mg, Q6H PRN  famotidine (PEPCID) tablet 20 mg, BID  nicotine (NICODERM CQ) 21 MG/24HR 1 patch, Daily PRN  [Held by complication ([facility] [address]    Acute alcoholic gastritis without hemorrhage    Perineal mass, female    Esophagitis candidal     Condyloma  Resolved Problems:    * No resolved hospital problems.  *      UPDATED PLAN:  See current orders  Neurology has been consulted at family request    IP CONSULT TO HOSPITALIST  IP CONSULT TO SOCIAL WORK  IP CONSULT TO [address] TO PULMONOLOGY  IP CONSULT TO [address] TO GI  IP CONSULT TO IV TEAM  IP CONSULT TO IV TEAM  IP CONSULT TO [address] Alex Dahl DO  10/23/2020  2:29 PM

## 2020-10-23 NOTE — CARE COORDINATION
Transitional planning. PT aware we need note for precert to begin. Spoke with Georgie and made aware pt has been off ativan for over 24 hours and there is no ciwa protocol in place. She stated they can give the librium at the SNF as long as it is not for withdrawal. PS Dr Cathryn Milton and he will include this on her ISMA. Await PT to see to start precert    1323 [facility]. We need rapid covid test bit not results. PS Dr Cathryn Milton for covid test. He also wants a neuro conult before D/C and neuro in room presently. She also needs note form resp for current bipap settings. Rama Georges called in  resp and will put settings in a note    1545 Called [facility] for transportation and set up for 8:00pm. Bedside RN aware. Called Barbara at [facility] and aware of time. Report number obtained    8609 AVS(ISMA), face sheet,HENS, and H&P faxed to  [facility] PP [phone].    1225 Annette Dahl Early Number and aware of pts D/C time to [facility]. Questions answered.     Discharge 1 [facility] Case Management Department  Written by: Najma Stephens RN    Patient Name: rufus Morning  Attending Provider: Rufina Sarmiento DO  Admit Date: 10/12/2020  7:48 PM  MRN: [id number]  Account: [de-identified]                     : 1970  Discharge Date:       Disposition: -[facility]    Najma Stephens RN

## 2020-10-23 NOTE — PROGRESS NOTES
Nutrition Diagnosis:   · Inadequate oral intake related to altered GI function(EtoH abuse) as evidenced by intake 26-50%(Need for ONS)      Nutrition Interventions:   Food and/or Nutrient Delivery:  Continue Current Diet, Start Oral Nutrition Supplement  Nutrition Education/Counseling:  Education not indicated   Coordination of Nutrition Care:  Continued Inpatient Monitoring    Goals: Achieved   Restart diet within 24-72 hrs       Nutrition Monitoring and Evaluation:   Food/Nutrient Intake Outcomes:  Food and Nutrient Intake, Supplement Intake  Physical Signs/Symptoms Outcomes:  Biochemical Data, Nutrition Focused Physical Findings, Skin, Weight, GI Status     Discharge Planning:     Too soon to determine     Electronically signed by Brian Moe RD, LD on 10/23/20 at 1:22 PM EDT    Contact: [phone]

## 2020-10-24 ENCOUNTER — APPOINTMENT (OUTPATIENT)
Dept: CT IMAGING | Age: 50
DRG: 190 | End: 2020-10-24
Payer: COMMERCIAL

## 2020-10-24 PROBLEM — E72.20 SERUM AMMONIA INCREASED (HCC): Status: ACTIVE | Noted: 2020-10-24

## 2020-10-24 LAB
ABSOLUTE EOS #: 0 K/UL (ref 0–0.4)
ABSOLUTE IMMATURE GRANULOCYTE: 0 K/UL (ref 0–0.3)
ABSOLUTE LYMPH #: 0.67 K/UL (ref 1–4.8)
ABSOLUTE MONO #: 0.67 K/UL (ref 0.1–0.8)
ALBUMIN SERPL-MCNC: 1.8 G/DL (ref 3.5–5.2)
ALBUMIN/GLOBULIN RATIO: 0.6 (ref 1–2.5)
ALP BLD-CCNC: 182 U/L (ref 35–104)
ALT SERPL-CCNC: 18 U/L (ref 5–33)
AMMONIA: 80 UMOL/L (ref 11–51)
ANION GAP SERPL CALCULATED.3IONS-SCNC: 7 MMOL/L (ref 9–17)
AST SERPL-CCNC: 29 U/L
BASOPHILS # BLD: 0 % (ref 0–2)
BASOPHILS ABSOLUTE: 0 K/UL (ref 0–0.2)
BILIRUB SERPL-MCNC: 0.39 MG/DL (ref 0.3–1.2)
BUN BLDV-MCNC: 6 MG/DL (ref 6–20)
BUN/CREAT BLD: ABNORMAL (ref 9–20)
CALCIUM SERPL-MCNC: 8.5 MG/DL (ref 8.6–10.4)
CHLORIDE BLD-SCNC: 115 MMOL/L (ref 98–107)
CO2: 20 MMOL/L (ref 20–31)
CREAT SERPL-MCNC: 0.42 MG/DL (ref 0.5–0.9)
DIFFERENTIAL TYPE: ABNORMAL
EOSINOPHILS RELATIVE PERCENT: 0 % (ref 1–4)
GFR AFRICAN AMERICAN: >60 ML/MIN
GFR NON-AFRICAN AMERICAN: >60 ML/MIN
GFR SERPL CREATININE-BSD FRML MDRD: ABNORMAL ML/MIN/{1.73_M2}
GFR SERPL CREATININE-BSD FRML MDRD: ABNORMAL ML/MIN/{1.73_M2}
GLUCOSE BLD-MCNC: 107 MG/DL (ref 70–99)
HCT VFR BLD CALC: 27.5 % (ref 36.3–47.1)
HEMOGLOBIN: 7.7 G/DL (ref 11.9–15.1)
IMMATURE GRANULOCYTES: 0 %
LYMPHOCYTES # BLD: 3 % (ref 24–44)
MCH RBC QN AUTO: 32 PG (ref 25.2–33.5)
MCHC RBC AUTO-ENTMCNC: 28 G/DL (ref 28.4–34.8)
MCV RBC AUTO: 114.1 FL (ref 82.6–102.9)
MONOCYTES # BLD: 3 % (ref 1–7)
MORPHOLOGY: ABNORMAL
MORPHOLOGY: ABNORMAL
NRBC AUTOMATED: 0 PER 100 WBC
PDW BLD-RTO: 16.7 % (ref 11.8–14.4)
PLATELET # BLD: 458 K/UL (ref 138–453)
PLATELET ESTIMATE: ABNORMAL
PMV BLD AUTO: 11.2 FL (ref 8.1–13.5)
POTASSIUM SERPL-SCNC: 4 MMOL/L (ref 3.7–5.3)
RBC # BLD: 2.41 M/UL (ref 3.95–5.11)
RBC # BLD: ABNORMAL 10*6/UL
SEG NEUTROPHILS: 94 % (ref 36–66)
SEGMENTED NEUTROPHILS ABSOLUTE COUNT: 21.06 K/UL (ref 1.8–7.7)
SODIUM BLD-SCNC: 142 MMOL/L (ref 135–144)
TOTAL PROTEIN: 5 G/DL (ref 6.4–8.3)
WBC # BLD: 22.4 K/UL (ref 3.5–11.3)
WBC # BLD: ABNORMAL 10*3/UL

## 2020-10-24 PROCEDURE — 87040 BLOOD CULTURE FOR BACTERIA: CPT

## 2020-10-24 PROCEDURE — 6370000000 HC RX 637 (ALT 250 FOR IP): Performed by: INTERNAL MEDICINE

## 2020-10-24 PROCEDURE — 99232 SBSQ HOSP IP/OBS MODERATE 35: CPT | Performed by: INTERNAL MEDICINE

## 2020-10-24 PROCEDURE — 2700000000 HC OXYGEN THERAPY PER DAY

## 2020-10-24 PROCEDURE — 36415 COLL VENOUS BLD VENIPUNCTURE: CPT

## 2020-10-24 PROCEDURE — 94761 N-INVAS EAR/PLS OXIMETRY MLT: CPT

## 2020-10-24 PROCEDURE — 82140 ASSAY OF AMMONIA: CPT

## 2020-10-24 PROCEDURE — 80053 COMPREHEN METABOLIC PANEL: CPT

## 2020-10-24 PROCEDURE — 94660 CPAP INITIATION&MGMT: CPT

## 2020-10-24 PROCEDURE — 94640 AIRWAY INHALATION TREATMENT: CPT

## 2020-10-24 PROCEDURE — 99232 SBSQ HOSP IP/OBS MODERATE 35: CPT | Performed by: NURSE PRACTITIONER

## 2020-10-24 PROCEDURE — 85025 COMPLETE CBC W/AUTO DIFF WBC: CPT

## 2020-10-24 PROCEDURE — 2580000003 HC RX 258: Performed by: INTERNAL MEDICINE

## 2020-10-24 PROCEDURE — 1200000000 HC SEMI PRIVATE

## 2020-10-24 PROCEDURE — 70450 CT HEAD/BRAIN W/O DYE: CPT

## 2020-10-24 RX ORDER — LACTULOSE 10 G/15ML
20 SOLUTION ORAL 3 TIMES DAILY
Status: DISCONTINUED | OUTPATIENT
Start: 2020-10-24 | End: 2020-10-26 | Stop reason: HOSPADM

## 2020-10-24 RX ADMIN — GABAPENTIN 200 MG: 100 CAPSULE ORAL at 13:45

## 2020-10-24 RX ADMIN — FERROUS SULFATE TAB EC 325 MG (65 MG FE EQUIVALENT) 325 MG: 325 (65 FE) TABLET DELAYED RESPONSE at 10:05

## 2020-10-24 RX ADMIN — GABAPENTIN 200 MG: 100 CAPSULE ORAL at 23:34

## 2020-10-24 RX ADMIN — Medication 54 MG: at 10:06

## 2020-10-24 RX ADMIN — CHLORDIAZEPOXIDE HYDROCHLORIDE 10 MG: 5 CAPSULE ORAL at 10:17

## 2020-10-24 RX ADMIN — HYDROXYZINE HYDROCHLORIDE 50 MG: 25 TABLET, FILM COATED ORAL at 13:49

## 2020-10-24 RX ADMIN — LACTULOSE 20 G: 20 SOLUTION ORAL at 17:18

## 2020-10-24 RX ADMIN — Medication 54 MG: at 23:34

## 2020-10-24 RX ADMIN — ROPINIROLE HYDROCHLORIDE 1 MG: 1 TABLET, FILM COATED ORAL at 23:33

## 2020-10-24 RX ADMIN — FAMOTIDINE 20 MG: 20 TABLET ORAL at 23:34

## 2020-10-24 RX ADMIN — AMLODIPINE BESYLATE 5 MG: 10 TABLET ORAL at 10:05

## 2020-10-24 RX ADMIN — POTASSIUM CHLORIDE 40 MEQ: 1500 TABLET, EXTENDED RELEASE ORAL at 10:07

## 2020-10-24 RX ADMIN — FERROUS SULFATE TAB EC 325 MG (65 MG FE EQUIVALENT) 325 MG: 325 (65 FE) TABLET DELAYED RESPONSE at 23:34

## 2020-10-24 RX ADMIN — PROPRANOLOL HYDROCHLORIDE 40 MG: 40 TABLET ORAL at 10:18

## 2020-10-24 RX ADMIN — TRAZODONE HYDROCHLORIDE 100 MG: 100 TABLET ORAL at 23:33

## 2020-10-24 RX ADMIN — SODIUM CHLORIDE, PRESERVATIVE FREE 10 ML: 5 INJECTION INTRAVENOUS at 23:35

## 2020-10-24 RX ADMIN — IPRATROPIUM BROMIDE AND ALBUTEROL SULFATE 1 AMPULE: .5; 3 SOLUTION RESPIRATORY (INHALATION) at 12:48

## 2020-10-24 RX ADMIN — OXYCODONE HYDROCHLORIDE AND ACETAMINOPHEN 1 TABLET: 5; 325 TABLET ORAL at 12:25

## 2020-10-24 RX ADMIN — BUSPIRONE HYDROCHLORIDE 15 MG: 15 TABLET ORAL at 23:33

## 2020-10-24 RX ADMIN — CARBAMAZEPINE 200 MG: 100 TABLET, EXTENDED RELEASE ORAL at 18:06

## 2020-10-24 RX ADMIN — AMOXICILLIN AND CLAVULANATE POTASSIUM 1 TABLET: 500; 125 TABLET, FILM COATED ORAL at 17:19

## 2020-10-24 RX ADMIN — BUSPIRONE HYDROCHLORIDE 15 MG: 15 TABLET ORAL at 01:20

## 2020-10-24 RX ADMIN — CHLORDIAZEPOXIDE HYDROCHLORIDE 10 MG: 5 CAPSULE ORAL at 23:34

## 2020-10-24 RX ADMIN — CHLORDIAZEPOXIDE HYDROCHLORIDE 10 MG: 5 CAPSULE ORAL at 13:45

## 2020-10-24 RX ADMIN — IPRATROPIUM BROMIDE AND ALBUTEROL SULFATE 1 AMPULE: .5; 3 SOLUTION RESPIRATORY (INHALATION) at 20:23

## 2020-10-24 RX ADMIN — THERA TABS 1 TABLET: TAB at 10:07

## 2020-10-24 RX ADMIN — AMOXICILLIN AND CLAVULANATE POTASSIUM 1 TABLET: 500; 125 TABLET, FILM COATED ORAL at 10:05

## 2020-10-24 RX ADMIN — CARBAMAZEPINE 200 MG: 100 TABLET, EXTENDED RELEASE ORAL at 23:34

## 2020-10-24 RX ADMIN — FAMOTIDINE 20 MG: 20 TABLET ORAL at 10:04

## 2020-10-24 RX ADMIN — FOLIC ACID 1 MG: 1 TABLET ORAL at 10:04

## 2020-10-24 RX ADMIN — Medication 100 MG: at 10:06

## 2020-10-24 RX ADMIN — IPRATROPIUM BROMIDE AND ALBUTEROL SULFATE 1 AMPULE: .5; 3 SOLUTION RESPIRATORY (INHALATION) at 16:45

## 2020-10-24 RX ADMIN — DULOXETINE HYDROCHLORIDE 60 MG: 30 CAPSULE, DELAYED RELEASE ORAL at 10:05

## 2020-10-24 RX ADMIN — BUSPIRONE HYDROCHLORIDE 15 MG: 15 TABLET ORAL at 10:04

## 2020-10-24 RX ADMIN — GABAPENTIN 200 MG: 100 CAPSULE ORAL at 10:04

## 2020-10-24 RX ADMIN — CARBAMAZEPINE 200 MG: 100 TABLET, EXTENDED RELEASE ORAL at 10:00

## 2020-10-24 RX ADMIN — PROPRANOLOL HYDROCHLORIDE 40 MG: 40 TABLET ORAL at 23:36

## 2020-10-24 RX ADMIN — PREDNISONE 30 MG: 20 TABLET ORAL at 10:06

## 2020-10-24 RX ADMIN — AMOXICILLIN AND CLAVULANATE POTASSIUM 1 TABLET: 500; 125 TABLET, FILM COATED ORAL at 01:20

## 2020-10-24 RX ADMIN — FLUCONAZOLE 200 MG: 200 TABLET ORAL at 10:07

## 2020-10-24 RX ADMIN — NICOTINE POLACRILEX 2 MG: 2 LOZENGE ORAL at 13:49

## 2020-10-24 RX ADMIN — HYDROXYZINE HYDROCHLORIDE 50 MG: 25 TABLET, FILM COATED ORAL at 23:34

## 2020-10-24 RX ADMIN — Medication 54 MG: at 13:48

## 2020-10-24 RX ADMIN — HYDROXYZINE HYDROCHLORIDE 50 MG: 25 TABLET, FILM COATED ORAL at 10:06

## 2020-10-24 ASSESSMENT — PAIN DESCRIPTION - PROGRESSION
CLINICAL_PROGRESSION: NOT CHANGED

## 2020-10-24 ASSESSMENT — ENCOUNTER SYMPTOMS
ABDOMINAL PAIN: 0
SHORTNESS OF BREATH: 0
NAUSEA: 0
COUGH: 0
VOMITING: 0
DIARRHEA: 1

## 2020-10-24 ASSESSMENT — PAIN SCALES - GENERAL
PAINLEVEL_OUTOF10: 3
PAINLEVEL_OUTOF10: 9

## 2020-10-24 ASSESSMENT — PAIN SCALES - WONG BAKER
WONGBAKER_NUMERICALRESPONSE: 0

## 2020-10-24 NOTE — PROGRESS NOTES
Neurology Nurse Practitioner Progress Note      INTERVAL HISTORY: This is a 45 y.o.  female admitted 10/12/2020 for COPD exacerbation ([address] [J44.1]. This is a follow-up neurology progress note. The patient was examined and the chart was reviewed. Discussed with the pt & RN. There were no acute events overnight. No new motor, sensory, visual or bulbar symptoms. HPI: Courtney Tsai is a 45 y.o. female with H/O HTN, COPD, astrocytoma resection x 2 (1989), seizure disorder, alcoholism, who was admitted 10/12/2020 for COPD exacerbation ([facility] [J44.1]. As per medical records patient presented to her PCP with shortness of breath, cough, generalized weakness and fatigue for the past 1 to 2 weeks; was noted to be hypoxic. She was sent to the ED for evaluation. Neurology was consulted for concern of change in mentation. Patient has history of seizure disorder; she is on Tegretol  mg 3 times a day. There was concern for alcohol withdrawal and she was started on CIWA protocol & Librium protocol.      fluconazole  200 mg Oral Daily    predniSONE  30 mg Oral Daily    Followed by   Delvis Reagan ON 10/26/2020] predniSONE  20 mg Oral Daily    Followed by   Delvis Reagan ON 10/29/2020] predniSONE  10 mg Oral Daily    DULoxetine  60 mg Oral Daily    sodium chloride  30 mL Intravenous Once    potassium chloride  40 mEq Oral Daily    amoxicillin-clavulanate  1 tablet Oral 3 times per day    chlordiazePOXIDE  10 mg Oral TID    ipratropium-albuterol  1 ampule Inhalation Q4H WA    amLODIPine  5 mg Oral Daily    busPIRone  15 mg Oral Q6H    carBAMazepine  200 mg Oral TID    ferrous sulfate  325 mg Oral BID    folic acid  1 mg Oral Daily    gabapentin  200 mg Oral TID    hydrOXYzine  50 mg Oral TID    magnesium carbonate  54 mg Oral TID    propranolol  40 mg Oral BID    rOPINIRole  1 mg Oral Nightly    traZODone  100 mg Oral Nightly    vitamin B-1  100 mg Oral Daily    sodium chloride flush  10 mL Intravenous 2 times per day    famotidine  20 mg Oral BID    [Held by provider] enoxaparin  40 mg Subcutaneous Daily    multivitamin  1 tablet Oral Daily    budesonide-formoterol  2 puff Inhalation BID       Past Medical History:   Diagnosis Date    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24, the patient underwent 2 surgical resections without known recurrence 10/23/2020    Closed fracture of lateral portion of left tibial plateau 2/4/6287    COPD (chronic obstructive pulmonary disease) (HCC)     Depression     bipolar, major depressive disorder, ptsd, anxiety    Hypertension     Pain, joint, ankle and foot        Past Surgical History:   Procedure Laterality Date    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    COLONOSCOPY N/A 10/22/2020    COLONOSCOPY DIAGNOSTIC performed by Berkley Buckley MD at [address] [place] [address] Left 7-3-13    ORIF tibial plateau    FRACTURE SURGERY Right     small finger metacarpal fracture    HYSTERECTOMY  2003    UPPER GASTROINTESTINAL ENDOSCOPY N/A 10/22/2020    EGD BIOPSY performed by Berkley Buckley MD at [facility]       PHYSICAL EXAM:      Blood pressure 115/70, pulse 84, temperature 98.8 °F (37.1 °C), temperature source Oral, resp. rate 16, height 5' 5\" (1.651 m), weight 148 lb (67.1 kg), SpO2 (!) 85 %.       Neurological Examination:  Mental status   Alert and oriented x 3; following all commands; delayed short responses   Cranial nerves   II - visual fields intact to confrontation; pupils reactive  III, IV, VI - extraocular muscles intact; no CAMMIE; horizontal nystagmus; no ptosis   V - normal facial sensation                                                               VII - normal facial symmetry                                                             VIII - intact hearing                                                                             IX, X - symmetrical palate elevation                                               XI - symmetrical shoulder shrug XII - midline tongue without atrophy or fasciculation   Motor function  Strength: Was able to lift all limbs antigravity  Normal bulk and tone                  Sensory function Grossly intact     Cerebellar F-T-N intact   Reflex function 2/4 symmetric throughout  Down going plantar response bilaterally   Gait                  Not tested       DATA      Lab Results   Component Value Date    WBC 22.4 (H) 10/24/2020    HGB 7.7 (L) 10/24/2020    HCT 27.5 (L) 10/24/2020     (H) 10/24/2020    ALT 18 10/24/2020    AST 29 10/24/2020     10/24/2020    K 4.0 10/24/2020     (H) 10/24/2020    CREATININE 0.42 (L) 10/24/2020    BUN 6 10/24/2020    CO2 20 10/24/2020    TSH 0.69 07/10/2020    INR 1.0 12/14/2019    [id number] 1744 (H) 07/10/2020    FOLATE 20.0 07/10/2020    LABA1C 4.3 07/14/2019     No results found for: CHOL  No results found for: TRIG  Lab Results   Component Value Date    HDL 40 (L) 06/18/2019     Lab Results   Component Value Date    LDLCHOLESTEROL 92 06/18/2019     Lab Results   Component Value Date    VLDL NOT REPORTED 06/18/2019     Lab Results   Component Value Date    CHOLHDLRATIO 3.8 06/18/2019        10/23/2020 17:16   Carbamazepine Lvl 3.8 (L)        10/19/2020 12:23   AFP 5.1   CA 19-9 123 (H)       DIAGNOSTIC DATA:  CT HEAD (10/24/2020): No acute intracranial abnormality     ECHO (10/17/2020): EF> 55%    EEG (10/23/2020): Normal                        IMPRESSION: 45 y.o.  female admitted with  Acute metabolic encephalopathy, in the setting of acute on chronic COPD exacerbation, pneumonia, possible alcohol withdrawal, hyperammonemia; CT head - negative.  Patient was awake, alert and oriented; short replies, seemed short of breath    Seizure disorder; EEG - normal; continue Tegretol  mg 3 times a day    Peripheral neuropathy; is on gabapentin 200 mg 3 times a day     Alcoholism; Librium 10 mg 3 times a day, thiamine and folic acid    Is on Augmentin and Diflucan    Hyperammonemia (80); started on lactulose    Comorbid conditions - HTN, astrocytoma resection x 2 (1989), PTSD, depression, anxiety    Continue PT/OT    No further neurological testing is required at this time. We will sign off. Please, call with any questions or concerns. Thank you    Please note that this note was generated using a voice recognition dictation software. Although every effort was made to ensure the accuracy of this automated transcription, some errors in transcription may have occurred.

## 2020-10-24 NOTE — FLOWSHEET NOTE
MD paged.   No CIWA orders       10/24/20 1000   CIWA-Ar   /70   Nausea and Vomiting 0   Tactile Disturbances 1   Tremor 4   Auditory Disturbances 2   Paroxysmal Sweats 0   Visual Disturbances 5   Anxiety 1   Headache, Fullness in Head 0   Agitation 3   Orientation and Clouding of Sensorium 3   CIWA-Ar Total 19

## 2020-10-24 NOTE — PLAN OF CARE
[facility], [facility] exacerb[facility] ([facility] [address] [J44.1] . Vitals:    10/24/20 1248   BP:    Pulse: 83   Resp: 20   Temp:    SpO2: 96%   . Patients home meds are   Prior to Admission medications    Medication Sig Start Date End Date Taking?  Authorizing Provider   tiotropium (SPIRIVA RESPIMAT) 1.25 MCG/ACT AERS inhaler Inhale 2 puffs into the lungs daily 10/12/20   Ludivina Baxter MD   tiZANidine (ZANAFLEX) 4 MG tablet Take 1 tablet by mouth every 8 hours as needed (back pain) 10/12/20   Ludivina Baxter MD   potassium chloride (KLOR-CON M) 20 MEQ extended release tablet Take 1 tablet by mouth daily 9/10/20   LOI Jin CNP   magnesium carbonate (MAGONATE) 54 (Mag Equiv) MG/5ML LIQD oral liquid Take 5 mLs by mouth 3 times daily 7/16/20   Ludivina Baxter MD   ferrous sulfate (IRON 325) 325 (65 Fe) MG tablet Take 1 tablet by mouth 2 times daily 7/16/20   Ludivina Baxter MD   rOPINIRole (REQUIP) 1 MG tablet Take 1 tablet by mouth nightly 7/8/20   Ludivina Baxter MD   ACETAMINOPHEN EXTRA STRENGTH 500 MG tablet Take 500 mg by mouth 3 times daily as needed 5/8/20   Historical Provider, MD   ibuprofen (ADVIL;MOTRIN) 800 MG tablet Take 800 mg by mouth 3 times daily as needed 5/15/20   Historical Provider, MD   propranolol (INDERAL) 40 MG tablet Take 40 mg by mouth 2 times daily 5/12/20   Historical Provider, MD   albuterol sulfate HFA (VENTOLIN HFA) 108 (90 Base) MCG/ACT inhaler Inhale 2 puffs into the lungs 4 times daily as needed for Wheezing 6/11/20   Ludivina Baxter MD   busPIRone (BUSPAR) 15 MG tablet Take 15 mg by mouth every 6 hours 6/11/20   Ludivina Baxter MD   escitalopram (LEXAPRO) 20 MG tablet Take 1.5 tablets by mouth daily 6/11/20   Ludivina Baxter MD   traZODone (DESYREL) 100 MG tablet Take 1 tablet by mouth nightly 6/11/20   Ludiivna Baxter MD   carBAMazepine (TEGRETOL XR) 200 MG extended release tablet Take 1 tablet by mouth 3 times daily 6/11/20   Ludivina Varghese MD   amLODIPine (NORVASC) 5 MG tablet Take 1 tablet by mouth daily 6/11/20   Ludivina Varghese MD   gabapentin (NEURONTIN) 100 MG capsule Take 2 capsules by mouth 3 times daily for 180 days.  Intended supply: 90 days 6/11/20 12/8/20  Ludivina Varghese MD   hydrOXYzine (ATARAX) 50 MG tablet Take 1 tablet by mouth 3 times daily 6/11/20   Ludivina Varghese MD   acamprosate (CAMPRAL) 333 MG tablet Take 2 tablets by mouth 3 times daily 12/10/19 10/12/20  Mignon Rivas APRN - NP   vitamin B-1 (THIAMINE) 100 MG tablet Take 1 tablet by mouth daily 7/12/19   Ludivina Varghese MD   vitamin D (ERGOCALCIFEROL) 95446 units CAPS capsule Take 1 capsule by mouth once a week 6/19/19   Ludivina Varghese MD   folic acid (FOLVITE) 1 MG tablet Take 1 tablet by mouth daily 6/19/19   Ludivina Varghese MD   .  Recent Surgical History: None = 0     Assessment     Peak Flow (asthma only)    Predicted: 359  Personal Best: NA  PEF   % Predicted   Peak Flow :     FEV1/FVC    FEV1 Predicted 2.85      FEV1 NA    FEV1 % Predicted   FVC   IS volume   IBW 57 kg    RR 20  Breath Sounds: rhonchi      Bronchodilator assessment at level  3  Hyperinflation assessment at level   Secretion Management assessment at level      [x]    Bronchodilator Assessment  BRONCHODILATOR ASSESSMENT SCORE  Score 0 1 2 3 4 5   Breath Sounds   []  Patient Baseline [x]  No Wheeze good aeration []  Faint, scattered wheezing, good aeration []  Expiratory Wheezing and or moderately diminished []  Insp/Exp wheeze and/or very diminished []  Insp/Exp and/ or marked distress   Respiratory Rate   []  Patient Baseline []  Less than 20 []  Less than 20 [x]  20-25 []  Greater than 25 []  Greater than 25   Peak flow % of Pred or PB []  NA   []  Greater than 90%  []  81-90% []  71-80% []  Less than or equal to 70%  or unable to perform []  Unable due to Respiratory Distress   Dyspnea re []  Patient Baseline []  No SOB []  No SOB [x]  SOB on exertion []  SOB min activity []  At rest/acute   e FEV% Predicted       []  NA []  Above 69%  []  Unable []  Above 60-69%  []  Unable []  Above 50-59%  []  Unable []  Above 35-49%  []  Unable []  Less than 35%  []  Unable                 []  Hyperinflation Assessment  Score 1 2 3   CXR and Breath Sounds   []  Clear []  No atelectasis  Basilar aeration []  Atelectasis or absent basilar breath sounds   Incentive Spirometry Volume  (Per IBW)   []  Greater than or equal to 15ml/Kg []  less than 15ml/Kg []  less than 15ml/Kg   Surgery within last 2 weeks []  None or general   []  Abdominal or thoracic surgery  []  Abdominal or thoracic   Chronic Pulmonary Historyre []  No []  Yes []  Yes     []  Secretion Management Assessment  Score 1 2 3   Bilateral Breath Sounds   []  Occasional Rhonchi []  Scattered Rhonchi []  Course Rhonchi and/or poor aeration   Sputum    []  Small amount of thin secretions []  Moderate amount of viscous secretions []  Copius, Viscious Yellow/ Secretions   CXR as reported by physician []  clear  []  Unavailable []  Infiltrates and/or consolidation  []  Unavailable []  Mucus Plugging and or lobar consolidation  []  Unavailable   Cough []  Strong, productive cough []  Weak productive cough []  No cough or weak non-productive cough   LEAH RANGEL  12:53 PM                            FEMALE                                  MALE                            FEV1 Predicted Normal Values                        FEV1 Predicted Normal Values          Age                                     Height in Feet and Inches       Age                                     Height in Feet and Inches       4' 11\" 5' 1\" 5' 3\" 5' 5\" 5' 7\" 5' 9\" 5' 11\" 6' 1\"  4' 11\" 5' 1\" 5' 3\" 5' 5\" 5' 7\" 5' 9\" 5' 11\" 6' 1\"   42 - 45 2.49 2.66 2.84 3.03 3.22 3.42 3.62 3.83 42 - 45 2.82 3.03 3.26 3.49 3.72 3.96 4.22 4.47   46 - 49 2.40 2.57 2.76 2.94 3.14 3.33 3.54 3.75 46 - 49 2.70 2.92 3.14 3.37 3.61 3.85 4.10 4.36   50 - 53 2.31 2.48 2.66 2.85 3. 04 3.24 3.45 3.66 50 - 53 2.58 2.80 3.02 3.25 3.49 3.73 3.98 4.24   54 - 57 2.21 2.38 2.57 2.75 2.95 3.14 3.35 3.56 54 - 57 2.46 2.67 2.89 3.12 3.36 3.60 3.85 4.11   58 - 61 2.10 2.28 2.46 2.65 2.84 3.04 3.24 3.45 58 - 61 2.32 2.54 2.76 2.99 3.23 3.47 3.72 3.98   62 - 65 1.99 2.17 2.35 2.54 2.73 2.93 3.13 3.34 62 - 65 2.19 2.40 2.62 2.85 3.09 3.33 3.58 3.84   66 - 69 1.88 2.05 2.23 2.42 2.61 2.81 3.02 3.23 66 - 69 2.04 2.26 2.48 2.71 2.95 3.19 3.44 3.70   70+ 1.82 1.99 2.17 2.36 2.55 2.75 2.95 3.16 70+ 1.97 2.19 2.41 2.64 2.87 3.12 3.37 3.62             Predicted Peak Expiratory Flow Rate                                       Height (in)  Female       Height (in) Male           Age 64 63 56 61 58 73 78 74 Age            21 344 357 372 387 402 417 432 446  60 62 64 66 68 70 72 74 76   25 337 352 366 381 396 411 426 441 25 447 476 505 533 [phone] 677   30 329 344 359 374 389 404 419 434 30 437 466 494 523 552 580 609 638 667   35 322 337 351 366 381 396 411 426 35 426 455 484 512 541 570 598 627 657   40 314 329 344 359 374 389 404 419 40 416 445 473 502 531 559 588 617 647   45 307 322 336 351 366 381 396 411 45 405 434 463 491 520 549 577 606 636   50 299 314 329 344 359 374 389 404 50 395 424 452 481 510 538 567 596 625   55 292 307 321 336 351 366 381 396 55 384 413 442 470 499 528 556 585 615   60 284 299 314 329 344 359 374 389 60 374 403 431 460 489 517 546 575 605   65 277 292 306 321 336 351 366 381 65 363 392 421 449 478 507 535 564 594   70 269 284 299 314 329 344 359 374 70 353 382 410 439 468 496 525 554 583   75 261 274 289 305 319 334 348 364 75 344 372 400 429 458 487 515 544 573   80 253 266 282 296 312 327 342 356 80 335 362 390 419 448 476 [phone]

## 2020-10-24 NOTE — PLAN OF CARE
Problem: Falls - Risk of:  Goal: Will remain free from falls  Description: Will remain free from falls  Outcome: Ongoing  Goal: Absence of physical injury  Description: Absence of physical injury  Outcome: Ongoing   Patient remained free from injury. Patient verbalized understanding of need for the safety precautions. Demonstrates proper use of assistive devices. Bed remains in the lowest position. Call light remains within reach. [place] Program in use. Problem: Pain:  Goal: Pain level will decrease  Description: Pain level will decrease  Outcome: Ongoing  Goal: Control of acute pain  Description: Control of acute pain  Outcome: Ongoing  Goal: Control of chronic pain  Description: Control of chronic pain  Outcome: Ongoing   Patient educated on available pain control measures including non-pharmacologic and pharmacologic. Pain assessments discussed and goal identified. Whiteboard updated for available time of next administration of pain medications.        Problem: Nutrition  Goal: Optimal nutrition therapy  Description: Nutrition Problem #1: Inadequate oral intake  Intervention: Food and/or Nutrient Delivery: Continue NPO(Start diet as able; recommend ensure enlive supplements TID as able)  Nutritional Goals: Restart diet within 24-72 hrs     Outcome: Ongoing     Problem: Breathing Pattern - Ineffective:  Goal: Ability to achieve and maintain a regular respiratory rate will improve  Description: Ability to achieve and maintain a regular respiratory rate will improve  Outcome: Ongoing     Problem: Gas Exchange - Impaired:  Goal: Levels of oxygenation will improve  Description: Levels of oxygenation will improve  Outcome: Ongoing     Problem: Skin Integrity:  Goal: Will show no infection signs and symptoms  Description: Will show no infection signs and symptoms  Outcome: Ongoing  Goal: Absence of new skin breakdown  Description: Absence of new skin breakdown  Outcome: Ongoing   Patient risk for impaired

## 2020-10-24 NOTE — PLAN OF CARE
Problem: Falls - Risk of:  Goal: Will remain free from falls  Description: Will remain free from falls  Outcome: Ongoing  Goal: Absence of physical injury  Description: Absence of physical injury  Outcome: Ongoing     Problem: Pain:  Goal: Pain level will decrease  Description: Pain level will decrease  Outcome: Ongoing  Goal: Control of acute pain  Description: Control of acute pain  Outcome: Ongoing  Goal: Control of chronic pain  Description: Control of chronic pain  Outcome: Ongoing     Problem: Breathing Pattern - Ineffective:  Goal: Ability to achieve and maintain a regular respiratory rate will improve  Description: Ability to achieve and maintain a regular respiratory rate will improve  Outcome: Ongoing     Problem: Gas Exchange - Impaired:  Goal: Levels of oxygenation will improve  Description: Levels of oxygenation will improve  Outcome: Ongoing     Problem: Skin Integrity:  Goal: Will show no infection signs and symptoms  Description: Will show no infection signs and symptoms  Outcome: Ongoing  Goal: Absence of new skin breakdown  Description: Absence of new skin breakdown  Outcome: Ongoing     Problem: Nutrition  Goal: Optimal nutrition therapy  Description: Nutrition Problem #1: Inadequate oral intake  Intervention: Food and/or Nutrient Delivery: Continue NPO(Start diet as able; recommend ensure enlive supplements TID as able)  Nutritional Goals: Restart diet within 24-72 hrs     10/23/2020 2206 by Yoana García  Outcome: Ongoing  10/23/2020 1328 by Estrella Loco RD, LD  Outcome: Ongoing  Note: Nutrition Problem #1: Inadequate oral intake  Intervention: Food and/or Nutrient Delivery: Continue Current Diet, Start Oral Nutrition Supplement  Nutritional Goals: Restart diet within 24-72 hrs

## 2020-10-24 NOTE — FLOWSHEET NOTE
Advance Care Planning     Advance Care Planning Activator (Inpatient)  Conversation Note      Date of ACP Conversation: 10/12/2020    Conversation Conducted with: Patient with Decision Making Capacity    ACP Activator: [address]:     Current Designated Health Care Decision Maker:   Primary Decision Maker: Tobin Casillas - Parent - [phone]    Secondary Decision Maker: Mauro Duvall - Brother/Sister - [phone]    Supplemental (Other) Decision Maker: Carlos Hester - Other - [phone]      Care Preferences    Ventilation: \"If you were in your present state of health and suddenly became very ill and were unable to breathe on your own, what would your preference be about the use of a ventilator (breathing machine) if it were available to you? \"      Would the patient desire the use of ventilator (breathing machine)?: yes    \"If your health worsens and it becomes clear that your chance of recovery is unlikely, what would your preference be about the use of a ventilator (breathing machine) if it were available to you? \"     Would the patient desire the use of ventilator (breathing machine)?: Yes      Resuscitation  \"CPR works best to restart the heart when there is a sudden event, like a heart attack, in someone who is otherwise healthy. Unfortunately, CPR does not typically restart the heart for people who have serious health conditions or who are very sick. \"    \"In the event your heart stopped as a result of an underlying serious health condition, would you want attempts to be made to restart your heart (answer \"yes\" for attempt to resuscitate) or would you prefer a natural death (answer \"no\" for do not attempt to resuscitate)? \" yes    Length of ACP Conversation in minutes:  45 min    Conversation Outcomes:  [x] ACP discussion completed  [] Existing advance directive reviewed with patient; no changes to patient's previously recorded wishes  [x] New Advance Directive completed  [] Portable Do Not Rescitate prepared for Provider review and signature  [] POLST/POST/MOLST/MOST prepared for Provider review and signature          10/24/20 1603   Encounter Summary   Services provided to: Patient and family together   Referral/Consult From: Family   Support System Parent; Family members   Place of [employer] None   Continue Visiting   (10/24/20)   Complexity of Encounter Moderate   Length of Encounter 1 hour   Spiritual Assessment Completed Yes   Advance Care Planning Yes   Routine   Type Follow up   Assessment Approachable   Intervention Active listening;Nurtured hope   Outcome Engaged in conversation;Expressed gratitude

## 2020-10-24 NOTE — PROGRESS NOTES
[facility]    Office: [address], DO, Pablo Hogan, DO, Kristin Wick, DO, Josiane Gillette Blood, DO, Daniel Francois MD, Lorri Reaves MD, Reuben Sanchez MD, Yvonne Anne MD, Trae Nixon MD, Kavon Vieyra MD, Gabby Gomes MD, Tim Rivera MD, Mary Pham MD, Yasir Miller, DO, Leilani Aragon MD, Nimisha Leal MD, Gabriella Rossi, DO, Reina Michelle MD,  Chris Rodriguez, DO, Lis Stafford MD, Ayden Joseph MD, Estrella Dasilva, Ryann Badillo, CNP, Joseph Cerna, CNP, Josr Diaz, CNS, Noah Barthel, CNP, Diego Harmon, CNP, Flynn Young, CNP, Adriana Echeverria, CNP, Barbara Merritt, CNP, NICOLLE TorresC, [facility], [facility], Tono Keyes, CNP, Shante Parkinson, CNP, Lesley Salinas, CNP, Andrea Oz, CNP, [facility], [facility]    Progress Note    10/24/2020    3:52 PM    Name:   Courtney Tsai  MRN:     [id number]     [place]:      [de-identified]   Room:   2003/2003-01  IP Day:  12  Admit Date:  10/12/2020  7:48 PM    PCP:   Jayla Castanon MD  Code Status:  Full Code    Subjective:     C/C:   Chief Complaint   Patient presents with    Shortness of Breath       Interval History Status:    Comfortable  No distress/complaint  Cognition still somewhat slow  Doing okay with diet    WBC 22. 4High k/uL   Hemoglobin 7.7Low g/dL Hematocrit 27.5Low %   .1High     Results for Aime Chambers (MRN [id number]) as of 10/24/2020 15:33   Ref. Range 10/22/2020 06:19 10/23/2020 07:23 10/24/2020 05:38   WBC Latest Ref Range: 3.5 - 11.3 k/uL 11.4 (H) 16.7 (H) 22.4 (H)   Urine and blood cultures ordered    Ammonia 80High   Lactulose initiated    CT negative: see below    EEG negative: See below    Brief History:     As documented in the medical record:  \"45year-old brought in by family after being noted to be hypoxic at her PCPs office.    Patient does complain of productive cough, bilateral lower extremity painx 2-3 months but worse in the last month. Chronic unchanged shortness of breath going on for more than a year. Has been using albuterol inhaler as needed. Continues to smoke about 1 pack/day. Drinks alcohol every day: Rum. Known history of hypertension, depression, COPD, excision of Astrocytoma. - ED evaluation showed patient was afebrile, hypoxic to 84% on room air, intoxicated with alcohol level 244, anemic hemoglobin 9.4, COVID rapid NAAT:  Negative, chest x-ray was unremarkable no pneumonia, high sensitive troponin 9, EKG with nonspecific ST-T changes. Lower extremity venous Dopplers negative for DVT. Initial findings and plan have included:  Acute COPD exacerbation- Bipap prn, oxygen, Pulmonary following, on Augmentin, Prednisone  Acute resp failure- Bipap  Etoh abuse- experiencing withdrawal, on Ciwa protocol, Librium  Depression- Psych consult   Pancreatic cyst- dw GI, needs MRI/ MRCP, CA 19-9 slightly elevated, AFP wnl  Tobacco abuse- refusing Nicotine patch, will try lozenges/ gum  Hypokalemia/ Hypomag- replace, schedule KCL, will start MagOx  Gi/ DVT proph\"    Subsequent database has included:  MRCP:  Impression:  1. Cystic lesion at the pancreas may reflect periampullary duodenal   diverticulum or pancreatic pseudocyst.  Correlative CT abdomen with IV and   oral contrast recommended. 2. A number of the MR series are degraded by breathing motion, blurring   detail. 3. Nonspecific dilated common bile duct without stricture or stone evident. This appears to drain at the possible duodenal diverticulum, or may be mildly   dilated because of extrinsic compression by pancreatic pseudocyst.   4. Hepatic steatosis and mild hepatomegaly. 5. Small volume bilateral pleural effusion with bibasilar consolidation. Pneumonia is a consideration. CT abdomen:  Impression:  1. Pancreatic head cyst is demonstrated, which may represent a pseudocyst or   possibly side branch IPMN.   No suspicious features were demonstrated within   this lesion on recent MRI. This finding may resulting compressive affects on   the adjacent bile duct, as described on recent MRI. As such, short-term   imaging follow-up in 3-6 months is suggested. 2.  Mild peripancreatic edema suggestive of mild acute pancreatitis. 3.  Pleural effusions and dependent airspace disease, favoring atelectasis. 4.  Bilateral mild hydroureteronephrosis to the level of the bladder, which   is distended. This may be due to a functional versus mechanical bladder   outlet obstruction and resulting hydronephrosis. Punctate left   nephrolithiasis is noted without stone in either ureter or the bladder. 5.  Findings consistent with hepatic steatosis. 6.  Mild edematous appearance of the distal sigmoid and rectum, which may be   accentuated by underdistention. Possibility of colitis should be considered. 7.  Chronic appearing T11 compression deformity. EGD 10/22:  1) Large amount of residual gastric food, partially digested, residual medication identified in the stomach body. Limited visualization. 2) Mild non-specific gastritis s/p cold biopsy. 3) Thick white plaque coating of the esophagus in the distal portion, possible food debri vs contrast vs candida. Cold biopsy taken. 4) Limited exam    Colonoscopy:  1) Large lobulated velvety and nodular mucosal growth extending from the anal verge into the gluteal, perineum, and vagina that grossly appears to suggest condyloma.    2) 7mm flat polyp at 25 cm and 8mm size flat polyp identified at 20 cm (not removed due to poor prep)   3) No signs of active bleeding observed, brown/green stool through out colon     Reported that she has not been sexually active for years  She is not   The patient has been unaware of the growth involving the perineum  No history of sexually transmitted diseases  Colorectal surgery consulted     Neurology was consulted  Database has included:  EEG:  Clinical correlation This EEG was normal. No focal or epileptiform abnormalities were   seen. CT brain:  Impression:  Stable CT brain with no acute intracranial abnormality. Medications:      Allergies:  No Known Allergies    Current Meds:   Scheduled Meds:    lactulose  20 g Oral TID    fluconazole  200 mg Oral Daily    predniSONE  30 mg Oral Daily    Followed by   Susana Vieira ON 10/26/2020] predniSONE  20 mg Oral Daily    Followed by   Susana Vieira ON 10/29/2020] predniSONE  10 mg Oral Daily    DULoxetine  60 mg Oral Daily    sodium chloride  30 mL Intravenous Once    potassium chloride  40 mEq Oral Daily    amoxicillin-clavulanate  1 tablet Oral 3 times per day    chlordiazePOXIDE  10 mg Oral TID    ipratropium-albuterol  1 ampule Inhalation Q4H WA    amLODIPine  5 mg Oral Daily    busPIRone  15 mg Oral Q6H    carBAMazepine  200 mg Oral TID    ferrous sulfate  325 mg Oral BID    folic acid  1 mg Oral Daily    gabapentin  200 mg Oral TID    hydrOXYzine  50 mg Oral TID    magnesium carbonate  54 mg Oral TID    propranolol  40 mg Oral BID    rOPINIRole  1 mg Oral Nightly    traZODone  100 mg Oral Nightly    vitamin B-1  100 mg Oral Daily    sodium chloride flush  10 mL Intravenous 2 times per day    famotidine  20 mg Oral BID    [Held by provider] enoxaparin  40 mg Subcutaneous Daily    multivitamin  1 tablet Oral Daily    budesonide-formoterol  2 puff Inhalation BID     Continuous Infusions:    sodium chloride 100 mL/hr at 10/21/20 2309     PRN Meds: metoprolol, potassium chloride **OR** potassium alternative oral replacement **OR** potassium chloride, magnesium sulfate, nicotine polacrilex, nicotine polacrilex, sodium chloride flush, oxyCODONE-acetaminophen, sodium chloride flush, acetaminophen **OR** [DISCONTINUED] acetaminophen, polyethylene glycol, promethazine **OR** ondansetron, nicotine, albuterol    Data:     Past Medical History:   has a past medical history of Astrocytoma ([facility] [address] - 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24, the patient underwent 2 surgical resections without known recurrence, Closed fracture of lateral portion of left tibial plateau, COPD (chronic obstructive pulmonary disease) ([address] Depression, Hypertension, and Pain, joint, ankle and foot. Social History:   reports that she has been smoking cigarettes. She has a 30.00 pack-year smoking history. She has never used smokeless tobacco. She reports current alcohol use. She reports current drug use. Drug: Marijuana. Family History:   Family History   Problem Relation Age of Onset    Other Father     Cancer Maternal Grandmother     Heart Disease Paternal Grandmother        Review of Systems:     Review of Systems   Constitutional: Positive for activity change (Decreased), appetite change (Diminished) and fatigue. Negative for chills, diaphoresis and fever. Respiratory: Negative for cough and shortness of breath. Cardiovascular: Negative for chest pain and palpitations. Gastrointestinal: Positive for diarrhea (Diarrhea - chronic ). Negative for abdominal pain, nausea and vomiting. Genitourinary: Negative for flank pain and hematuria. Skin: Positive for wound (Buttock wound reported). Neurological: Positive for weakness. Negative for tremors. Psychiatric/Behavioral: Positive for decreased concentration and sleep disturbance (Not sleeping well). Physical Examination:        Physical Exam  Vitals signs reviewed. Constitutional:       General: She is not in acute distress. Appearance: She is ill-appearing. She is not diaphoretic. HENT:      Head: Normocephalic. Nose: Nose normal.   Eyes:      General: No scleral icterus. Conjunctiva/sclera: Conjunctivae normal.   Neck:      Musculoskeletal: Neck supple. Trachea: No tracheal deviation. Cardiovascular:      Rate and Rhythm: Normal rate and regular rhythm. Pulmonary:      Effort: Pulmonary effort is normal. No respiratory distress.       Breath sounds: Normal breath 10/16/2020    POCPO2 80.7 10/16/2020    POCHCO3 25.9 10/16/2020    NBEA NOT REPORTED 10/16/2020    PBEA 2 10/16/2020    QEN0IAT 27 10/16/2020    SYGI1TNU 96 10/16/2020    FIO2 55.0 10/16/2020     Lab Results   Component Value Date/Time    SPECIAL NOT REPORTED 10/13/2020 08:27 AM     Lab Results   Component Value Date/Time    CULTURE NORMAL RESPIRATORY SUSIE MODERATE GROWTH 10/13/2020 08:27 AM       Radiology:  Ct Abdomen Pelvis W Iv Contrast Additional Contrast? Oral    Result Date: 10/20/2020  1. Pancreatic head cyst is demonstrated, which may represent a pseudocyst or possibly side branch IPMN. No suspicious features were demonstrated within this lesion on recent MRI. This finding may resulting compressive affects on the adjacent bile duct, as described on recent MRI. As such, short-term imaging follow-up in 3-6 months is suggested. 2.  Mild peripancreatic edema suggestive of mild acute pancreatitis. 3.  Pleural effusions and dependent airspace disease, favoring atelectasis. 4.  Bilateral mild hydroureteronephrosis to the level of the bladder, which is distended. This may be due to a functional versus mechanical bladder outlet obstruction and resulting hydronephrosis. Punctate left nephrolithiasis is noted without stone in either ureter or the bladder. 5.  Findings consistent with hepatic steatosis. 6.  Mild edematous appearance of the distal sigmoid and rectum, which may be accentuated by underdistention. Possibility of colitis should be considered. 7.  Chronic appearing T11 compression deformity. Us Liver    Result Date: 10/19/2020  *Fatty infiltration of the liver. *Dilatation of the CBD measuring 11.3 mm. Gallbladder appears unremarkable. In addition, there appears to be a cystic lesion involving the head of the pancreas measuring 2.4 x 2.2 x 1.5 cm. Further evaluation with MRI/MRCP with and without IV contrast is recommended.   Differential considerations includes small pseudocyst, duodenal diverticulum or possibly pancreatic cystic neoplasm. Findings were not present on the 07/15/2019 study. Xr Chest Portable    Result Date: 10/17/2020  Mild interval improvement in scattered pulmonary opacities. Decrease in size of pleural effusions, now minimal.     Xr Chest Portable    Result Date: 10/16/2020  Small bilateral pleural effusions. Worsening right lower lobe infiltrate and possibly new left lower lobe infiltrate. Xr Chest Portable    Result Date: 10/14/2020  Small right pleural effusion. Linear opacity in the right lung base, atelectasis versus pneumonia. Mri Abdomen W Wo Contrast Mrcp    Result Date: 10/19/2020  1. Cystic lesion at the pancreas may reflect periampullary duodenal diverticulum or pancreatic pseudocyst.  Correlative CT abdomen with IV and oral contrast recommended. 2. A number of the MR series are degraded by breathing motion, blurring detail. 3. Nonspecific dilated common bile duct without stricture or stone evident. This appears to drain at the possible duodenal diverticulum, or may be mildly dilated because of extrinsic compression by pancreatic pseudocyst. 4. Hepatic steatosis and mild hepatomegaly. 5. Small volume bilateral pleural effusion with bibasilar consolidation. Pneumonia is a consideration.  RECOMMENDATIONS: CT abdomen with IV and oral contrast     Assessment:        Principal Problem:    COPD exacerbation (HCC)  Active Problems:    Essential hypertension    Depression    Hypokalemia    Macrocytic anemia    Hypomagnesemia    Ambulatory dysfunction    Seizures (HCC)    Alcohol withdrawal syndrome without complication (HCC)    Paresthesia of lower extremity    Acute respiratory failure with hypoxia (HCC)    Pancreatic cyst    Recurrent major depressive disorder (HCC)    Elevated CA 19-9 level    Acute alcoholic intoxication without complication (HCC)    Acute alcoholic gastritis without hemorrhage    Perineal mass, female    Esophagitis candidal     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 *      Plan:        Monitor leukocytosis (prednisone versus infection)  Cultures ordered  Lactulose initiated  Diflucan initiated to cover Candida esophagitis  Colorectal surgery for perineal mass, condyloma?   Check VDRL  Check HIV screen  Encourage compliance to oxygen and BiPAP  Encourage compliance to PT  Respiratory Therapy and Bronchodilators prn  Pulmonary evaluation and f/u as scheduled   Observe for further DTs  Thiamine  Ativan  Psych evaluation  and follow-up as scheduled, MDD  Anemia w/u on outpatient basis is suggested    Correct electrolyte abnormalities  Physical therapy and rehabilitation    Seizure precautions  Check Tegretol level  Aspiration precautions  GI evaluation and follow-up, awaiting Bx's  Wound care: Buttock wound  , Hx of LVSD, outpatient echo   The patient now agrees to skilled nursing facility  Home oxygen evaluation  Discharge planning initiated  Awaiting precertification from [address] TO PULMONOLOGY  IP CONSULT TO [address] TO GI  IP CONSULT TO IV TEAM  IP CONSULT TO IV TEAM  IP CONSULT TO [id number] Michelle DO  10/24/2020  3:52 PM

## 2020-10-24 NOTE — FLOWSHEET NOTE
Patient reports seeing Eduardo Campos and bugs throughout room. Writer paged Dr. Kiera Walter, awaiting orders.     Thaddeus De La Paz RN
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
## 2020-10-24 NOTE — PROCEDURES
Intravenous Once Dheeraj Mcallister MD        sodium chloride flush 0.9 % injection 10 mL  10 mL Intravenous PRN Dheeraj Mcallister MD        potassium chloride (KLOR-CON M) extended release tablet 40 mEq  40 mEq Oral Daily Dheeraj Mcallister MD   40 mEq at 10/23/20 0955    oxyCODONE-acetaminophen (PERCOCET) 5-325 MG per tablet 1 tablet  1 tablet Oral Q6H PRN Dheeraj Mcallister MD   1 tablet at 10/22/20 1636    amoxicillin-clavulanate (AUGMENTIN) 500-125 MG per tablet 1 tablet  1 tablet Oral 3 times per day Dheeraj Mcallister MD   1 tablet at 10/24/20 0120    chlordiazePOXIDE (LIBRIUM) capsule 10 mg  10 mg Oral TID Dheeraj Mcallister MD   10 mg at 10/23/20 2028    ipratropium-albuterol (Greenlee Nab) nebulizer solution 1 ampule  1 ampule Inhalation Q4H WA Dheeraj Mcallister MD   1 ampule at 10/23/20 1944    amLODIPine (NORVASC) tablet 5 mg  5 mg Oral Daily Dheeraj Mcallister MD   5 mg at 10/22/20 9949    busPIRone (BUSPAR) tablet 15 mg  15 mg Oral Q6H Dheeraj Mcallister MD   15 mg at 10/24/20 0120    carBAMazepine (TEGRETOL XR) extended release tablet 200 mg  200 mg Oral TID Dheeraj Mcallister MD   200 mg at 10/23/20 2022    ferrous sulfate (FE TABS 325) EC tablet 325 mg  325 mg Oral BID Dheeraj Mcallister MD   325 mg at 85/89/10 3254    folic acid (FOLVITE) tablet 1 mg  1 mg Oral Daily Dheeraj Mcallister MD   1 mg at 10/23/20 7372    gabapentin (NEURONTIN) capsule 200 mg  200 mg Oral TID Dheeraj Mcallister MD   200 mg at 10/23/20 2021    hydrOXYzine (ATARAX) tablet 50 mg  50 mg Oral TID Dheeraj Mcallister MD   50 mg at 10/23/20 2021    magnesium carbonate (MAGONATE) 54 (Mag Equiv) MG/5ML oral liquid 54 mg  54 mg Oral TID Dheeraj Mcallister MD   54 mg at 10/23/20 2022    propranolol (INDERAL) tablet 40 mg  40 mg Oral BID Dheeraj Mcallister MD   40 mg at 10/23/20 2021    rOPINIRole (REQUIP) tablet 1 mg  1 mg Oral Nightly Dheeraj Mcallister MD   1 mg at 10/23/20 2021    traZODone (DESYREL) tablet 100 mg  100 mg Oral Nightly Dheeraj Mcallister MD   100 mg at 10/23/20 2021    vitamin B-1 (THIAMINE) tablet 100 mg  100 mg Oral Daily Ila Lopez MD   100 mg at 10/23/20 0956    sodium chloride flush 0.9 % injection 10 mL  10 mL Intravenous 2 times per day Ila Lopez MD   10 mL at 10/23/20 2326    sodium chloride flush 0.9 % injection 10 mL  10 mL Intravenous PRN Ila Lopez MD        acetaminophen (TYLENOL) tablet 650 mg  650 mg Oral Q6H PRN Ila Lopez MD   650 mg at 10/23/20 0653    polyethylene glycol (GLYCOLAX) packet 17 g  17 g Oral Daily PRN Ila Lopez MD        promethazine (PHENERGAN) tablet 12.5 mg  12.5 mg Oral Q6H PRN Ila Lopez MD        Or    [facility]ron [facility] injection 4 mg  4 mg Intravenous Q6H PRN Ila Lopez MD   4 mg at 10/17/20 1624    famotidine (PEPCID) tablet 20 mg  20 mg Oral BID Ila Lopez MD   20 mg at 10/23/20 2022    nicotine (NICODERM CQ) 21 MG/24HR 1 patch  1 patch Transdermal Daily PRN Ila Lopez MD   1 patch at 10/15/20 1054    [Held by provider] enoxaparin (LOVENOX) injection 40 mg  40 mg Subcutaneous Daily LOI Singletary CNP   40 mg at 10/18/20 8602    multivitamin 1 tablet  1 tablet Oral Daily Ila Lopez MD   1 tablet at 10/23/20 0955    albuterol (PROVENTIL) nebulizer solution 2.5 mg  2.5 mg Nebulization As Directed RT PRN Ila Lopez MD   2.5 mg at 10/14/20 2045    budesonide-formoterol (SYMBICORT) 160-4.5 MCG/ACT inhaler 2 puff  2 puff Inhalation BID Ila Lopez MD   2 puff at 10/23/20 2807        Technical Description: This is a 21 channel digital EEG recording with time-locked video. Electrodes were placed in accordance with the 10-20 International System of Electrode Placement. Single lead EKG monitoring as well as temporal electrodes were included. The patient was not sleep deprived. This recording was obtained during wakefulness.   EEG Description: The dominant background activity during maximal recorded wakefulness consisted of bioccipitally dominant 9-10 Hz, 25-35 uV symmetric, regular activity

## 2020-10-24 NOTE — CARE COORDINATION
Was contacted by the [facility] NP to review as the nurse reached out secondary to the patient having a discharge order and transportation was present for  but no medication reconciliation completed. I reviewed the chart and Neurology seen patient and had further work up planned and the work up has not been started. Will cancel the discharge and have primary re-evaluate in the morning.

## 2020-10-25 LAB
% FREE CARBAMAZEPINE: 31.4 % (ref 8–35)
ABSOLUTE EOS #: 0.25 K/UL (ref 0–0.4)
ABSOLUTE IMMATURE GRANULOCYTE: 0 K/UL (ref 0–0.3)
ABSOLUTE LYMPH #: 1.72 K/UL (ref 1–4.8)
ABSOLUTE MONO #: 0.25 K/UL (ref 0.1–0.8)
ALBUMIN SERPL-MCNC: 1.8 G/DL (ref 3.5–5.2)
ALBUMIN/GLOBULIN RATIO: 0.5 (ref 1–2.5)
ALP BLD-CCNC: 191 U/L (ref 35–104)
ALT SERPL-CCNC: 15 U/L (ref 5–33)
AMMONIA: 79 UMOL/L (ref 11–51)
ANION GAP SERPL CALCULATED.3IONS-SCNC: 8 MMOL/L (ref 9–17)
AST SERPL-CCNC: 24 U/L
BASOPHILS # BLD: 0 % (ref 0–2)
BASOPHILS ABSOLUTE: 0 K/UL (ref 0–0.2)
BILIRUB SERPL-MCNC: 0.36 MG/DL (ref 0.3–1.2)
BUN BLDV-MCNC: 7 MG/DL (ref 6–20)
BUN/CREAT BLD: ABNORMAL (ref 9–20)
CALCIUM IONIZED: 1.42 MMOL/L (ref 1.13–1.33)
CALCIUM SERPL-MCNC: 9 MG/DL (ref 8.6–10.4)
CALPROTECTIN, FECAL: 10 UG/G
CARBAMAZEPINE, FREE: 1.6 UG/ML (ref 1–3)
CARBAMAZEPINE, TOTAL: 5.1 UG/ML (ref 4–12)
CHLORIDE BLD-SCNC: 116 MMOL/L (ref 98–107)
CO2: 20 MMOL/L (ref 20–31)
CREAT SERPL-MCNC: 0.54 MG/DL (ref 0.5–0.9)
DIFFERENTIAL TYPE: ABNORMAL
EOSINOPHILS RELATIVE PERCENT: 1 % (ref 1–4)
FECAL PANCREATIC ELASTASE-1: 196 UG/G
GFR AFRICAN AMERICAN: >60 ML/MIN
GFR NON-AFRICAN AMERICAN: >60 ML/MIN
GFR SERPL CREATININE-BSD FRML MDRD: ABNORMAL ML/MIN/{1.73_M2}
GFR SERPL CREATININE-BSD FRML MDRD: ABNORMAL ML/MIN/{1.73_M2}
GLUCOSE BLD-MCNC: 98 MG/DL (ref 70–99)
HCT VFR BLD CALC: 26 % (ref 36.3–47.1)
HEMOGLOBIN: 7.9 G/DL (ref 11.9–15.1)
IMMATURE GRANULOCYTES: 0 %
LYMPHOCYTES # BLD: 7 % (ref 24–44)
MCH RBC QN AUTO: 32.1 PG (ref 25.2–33.5)
MCHC RBC AUTO-ENTMCNC: 30.4 G/DL (ref 28.4–34.8)
MCV RBC AUTO: 105.7 FL (ref 82.6–102.9)
MONOCYTES # BLD: 1 % (ref 1–7)
MORPHOLOGY: ABNORMAL
MORPHOLOGY: ABNORMAL
NRBC AUTOMATED: 0 PER 100 WBC
PDW BLD-RTO: 16.8 % (ref 11.8–14.4)
PLATELET # BLD: 463 K/UL (ref 138–453)
PLATELET ESTIMATE: ABNORMAL
PMV BLD AUTO: 11.2 FL (ref 8.1–13.5)
POTASSIUM SERPL-SCNC: 3.8 MMOL/L (ref 3.7–5.3)
RBC # BLD: 2.46 M/UL (ref 3.95–5.11)
RBC # BLD: ABNORMAL 10*6/UL
SEG NEUTROPHILS: 91 % (ref 36–66)
SEGMENTED NEUTROPHILS ABSOLUTE COUNT: 22.28 K/UL (ref 1.8–7.7)
SODIUM BLD-SCNC: 144 MMOL/L (ref 135–144)
TOTAL PROTEIN: 5.1 G/DL (ref 6.4–8.3)
WBC # BLD: 24.5 K/UL (ref 3.5–11.3)
WBC # BLD: ABNORMAL 10*3/UL

## 2020-10-25 PROCEDURE — 80053 COMPREHEN METABOLIC PANEL: CPT

## 2020-10-25 PROCEDURE — 6370000000 HC RX 637 (ALT 250 FOR IP): Performed by: INTERNAL MEDICINE

## 2020-10-25 PROCEDURE — 85025 COMPLETE CBC W/AUTO DIFF WBC: CPT

## 2020-10-25 PROCEDURE — 97530 THERAPEUTIC ACTIVITIES: CPT

## 2020-10-25 PROCEDURE — 97110 THERAPEUTIC EXERCISES: CPT

## 2020-10-25 PROCEDURE — 94761 N-INVAS EAR/PLS OXIMETRY MLT: CPT

## 2020-10-25 PROCEDURE — 94660 CPAP INITIATION&MGMT: CPT

## 2020-10-25 PROCEDURE — 36415 COLL VENOUS BLD VENIPUNCTURE: CPT

## 2020-10-25 PROCEDURE — 1200000000 HC SEMI PRIVATE

## 2020-10-25 PROCEDURE — 82140 ASSAY OF AMMONIA: CPT

## 2020-10-25 PROCEDURE — 2580000003 HC RX 258: Performed by: INTERNAL MEDICINE

## 2020-10-25 PROCEDURE — 99232 SBSQ HOSP IP/OBS MODERATE 35: CPT | Performed by: FAMILY MEDICINE

## 2020-10-25 PROCEDURE — 99232 SBSQ HOSP IP/OBS MODERATE 35: CPT | Performed by: INTERNAL MEDICINE

## 2020-10-25 PROCEDURE — 94640 AIRWAY INHALATION TREATMENT: CPT

## 2020-10-25 PROCEDURE — 82330 ASSAY OF CALCIUM: CPT

## 2020-10-25 PROCEDURE — 2700000000 HC OXYGEN THERAPY PER DAY

## 2020-10-25 RX ADMIN — LACTULOSE 20 G: 20 SOLUTION ORAL at 08:34

## 2020-10-25 RX ADMIN — IPRATROPIUM BROMIDE AND ALBUTEROL SULFATE 1 AMPULE: .5; 3 SOLUTION RESPIRATORY (INHALATION) at 11:42

## 2020-10-25 RX ADMIN — Medication 54 MG: at 14:55

## 2020-10-25 RX ADMIN — BUDESONIDE AND FORMOTEROL FUMARATE DIHYDRATE 2 PUFF: 160; 4.5 AEROSOL RESPIRATORY (INHALATION) at 09:07

## 2020-10-25 RX ADMIN — TRAZODONE HYDROCHLORIDE 100 MG: 100 TABLET ORAL at 23:09

## 2020-10-25 RX ADMIN — AMOXICILLIN AND CLAVULANATE POTASSIUM 1 TABLET: 500; 125 TABLET, FILM COATED ORAL at 10:08

## 2020-10-25 RX ADMIN — Medication 100 MG: at 12:13

## 2020-10-25 RX ADMIN — LACTULOSE 20 G: 20 SOLUTION ORAL at 02:07

## 2020-10-25 RX ADMIN — NICOTINE POLACRILEX 2 MG: 2 GUM, CHEWING BUCCAL at 12:15

## 2020-10-25 RX ADMIN — PROPRANOLOL HYDROCHLORIDE 40 MG: 40 TABLET ORAL at 08:33

## 2020-10-25 RX ADMIN — AMOXICILLIN AND CLAVULANATE POTASSIUM 1 TABLET: 500; 125 TABLET, FILM COATED ORAL at 18:13

## 2020-10-25 RX ADMIN — OXYCODONE HYDROCHLORIDE AND ACETAMINOPHEN 1 TABLET: 5; 325 TABLET ORAL at 06:50

## 2020-10-25 RX ADMIN — HYDROXYZINE HYDROCHLORIDE 50 MG: 25 TABLET, FILM COATED ORAL at 23:09

## 2020-10-25 RX ADMIN — BUDESONIDE AND FORMOTEROL FUMARATE DIHYDRATE 2 PUFF: 160; 4.5 AEROSOL RESPIRATORY (INHALATION) at 20:42

## 2020-10-25 RX ADMIN — CHLORDIAZEPOXIDE HYDROCHLORIDE 10 MG: 5 CAPSULE ORAL at 14:53

## 2020-10-25 RX ADMIN — LACTULOSE 20 G: 20 SOLUTION ORAL at 14:45

## 2020-10-25 RX ADMIN — AMOXICILLIN AND CLAVULANATE POTASSIUM 1 TABLET: 500; 125 TABLET, FILM COATED ORAL at 02:41

## 2020-10-25 RX ADMIN — CHLORDIAZEPOXIDE HYDROCHLORIDE 10 MG: 5 CAPSULE ORAL at 08:41

## 2020-10-25 RX ADMIN — CHLORDIAZEPOXIDE HYDROCHLORIDE 10 MG: 5 CAPSULE ORAL at 23:09

## 2020-10-25 RX ADMIN — CARBAMAZEPINE 200 MG: 100 TABLET, EXTENDED RELEASE ORAL at 08:32

## 2020-10-25 RX ADMIN — BUSPIRONE HYDROCHLORIDE 15 MG: 15 TABLET ORAL at 18:15

## 2020-10-25 RX ADMIN — PREDNISONE 30 MG: 20 TABLET ORAL at 08:32

## 2020-10-25 RX ADMIN — HYDROXYZINE HYDROCHLORIDE 50 MG: 25 TABLET, FILM COATED ORAL at 08:32

## 2020-10-25 RX ADMIN — BUSPIRONE HYDROCHLORIDE 15 MG: 15 TABLET ORAL at 12:14

## 2020-10-25 RX ADMIN — PROPRANOLOL HYDROCHLORIDE 40 MG: 40 TABLET ORAL at 23:10

## 2020-10-25 RX ADMIN — GABAPENTIN 200 MG: 100 CAPSULE ORAL at 08:31

## 2020-10-25 RX ADMIN — FERROUS SULFATE TAB EC 325 MG (65 MG FE EQUIVALENT) 325 MG: 325 (65 FE) TABLET DELAYED RESPONSE at 08:32

## 2020-10-25 RX ADMIN — IPRATROPIUM BROMIDE AND ALBUTEROL SULFATE 1 AMPULE: .5; 3 SOLUTION RESPIRATORY (INHALATION) at 16:48

## 2020-10-25 RX ADMIN — CARBAMAZEPINE 200 MG: 100 TABLET, EXTENDED RELEASE ORAL at 23:09

## 2020-10-25 RX ADMIN — DULOXETINE HYDROCHLORIDE 60 MG: 30 CAPSULE, DELAYED RELEASE ORAL at 08:32

## 2020-10-25 RX ADMIN — POTASSIUM CHLORIDE 40 MEQ: 1500 TABLET, EXTENDED RELEASE ORAL at 08:31

## 2020-10-25 RX ADMIN — CARBAMAZEPINE 200 MG: 100 TABLET, EXTENDED RELEASE ORAL at 14:44

## 2020-10-25 RX ADMIN — HYDROXYZINE HYDROCHLORIDE 50 MG: 25 TABLET, FILM COATED ORAL at 14:46

## 2020-10-25 RX ADMIN — IPRATROPIUM BROMIDE AND ALBUTEROL SULFATE 1 AMPULE: .5; 3 SOLUTION RESPIRATORY (INHALATION) at 20:42

## 2020-10-25 RX ADMIN — FOLIC ACID 1 MG: 1 TABLET ORAL at 08:32

## 2020-10-25 RX ADMIN — GABAPENTIN 200 MG: 100 CAPSULE ORAL at 14:44

## 2020-10-25 RX ADMIN — SODIUM CHLORIDE, PRESERVATIVE FREE 10 ML: 5 INJECTION INTRAVENOUS at 08:00

## 2020-10-25 RX ADMIN — FLUCONAZOLE 200 MG: 200 TABLET ORAL at 08:34

## 2020-10-25 RX ADMIN — BUSPIRONE HYDROCHLORIDE 15 MG: 15 TABLET ORAL at 06:48

## 2020-10-25 RX ADMIN — AMLODIPINE BESYLATE 5 MG: 10 TABLET ORAL at 08:32

## 2020-10-25 RX ADMIN — IPRATROPIUM BROMIDE AND ALBUTEROL SULFATE 1 AMPULE: .5; 3 SOLUTION RESPIRATORY (INHALATION) at 08:55

## 2020-10-25 RX ADMIN — FAMOTIDINE 20 MG: 20 TABLET ORAL at 08:31

## 2020-10-25 RX ADMIN — Medication 54 MG: at 10:08

## 2020-10-25 RX ADMIN — THERA TABS 1 TABLET: TAB at 08:34

## 2020-10-25 ASSESSMENT — PAIN SCALES - GENERAL: PAINLEVEL_OUTOF10: 7

## 2020-10-25 NOTE — PROGRESS NOTES
[facility]  Office: [address], DO, Peggy Reji, DO, Kenneth Jay, DO, Refugio Harris Blood, DO, Floridalma Yi MD, Emir Tucker MD, Cleve Dale MD, Julio Monzon MD, Josh Zamarripa MD, Ynes Stallworth MD, Sarah Hernandez MD, Rosa Moran MD, Mary Oquendo MD, Olga Fulton, DO, Darío Bethea MD, Hoda Santo MD, Geovanna Sim DO, Raul Loza MD,  Ayana Martinez, DO, Supa Duncan MD, Ruddy Foster MD, Caty Flores, [facility], [facility], CNP, Zoë Mcknight, CNP, Nguyễn Foster, CNS, Mulu Mccarthy, CNP, Annalee Contreras, CNP, Usman Tubbs, CNP, Connor De Jesus, CNP, Florentin Miramontes, CNP, Catheryn Leventhal, PA-C, Radha Mcdonald, ERIK, Kitty Hicks, CNP, Yimi Membreno, CNP, Jory Huffman, CNP, Tr Flores, CNP, Katherine King, CNP         [facility]   [address] [facility]    Progress Note    10/25/2020    2:12 PM    Name:   Pushpa Torres  MRN:     [id number]     [place]:      [de-identified]   Room:   2003/2003-01  IP Day:  15  Admit Date:  10/12/2020  7:48 PM    PCP:   Adriana Morales MD  Code Status:  Full Code    Subjective:     C/C:   Chief Complaint   Patient presents with    Shortness of Breath     Interval History Status: improved. Patient seen and examined at bedside, no acute events overnight. Alert and able to answer all my questions  EEG normal  Patient denies any chest pain, shortness of breath, chills, fevers, nausea or vomiting. Patient vitals, labs and all providers notes were reviewed,from overnight shift and morning updates were noted and discussed with the nurse    Brief History:     Per chart  As documented in the medical record:  \"45year-old brought in by family after being noted to be hypoxic at her PCPs office.    Patient does complain of productive cough, bilateral lower extremity painx 2-3 months but worse in the last month.  Chronic unchanged shortness of breath going on for more than a year.  Has been using albuterol inhaler as needed.  Continues to smoke about 1 pack/day.  Drinks alcohol every day: Rum. Known history of hypertension, depression, COPD, excision of Astrocytoma. - ED evaluation showed patient was afebrile, hypoxic to 84% on room air, intoxicated with alcohol level 244, anemic hemoglobin 9.4, COVID rapid NAAT:  Negative, chest x-ray was unremarkable no pneumonia, high sensitive troponin 9, EKG with nonspecific ST-T changes. Lower extremity venous Dopplers negative for DVT.    Initial findings and plan have included:  Acute COPD exacerbation- Bipap prn, oxygen, Pulmonary following, on Augmentin, Prednisone  Acute resp failure- Bipap  Etoh abuse- experiencing withdrawal, on Ciwa protocol, Librium  Depression- Psych consult   Pancreatic cyst- dw GI, needs MRI/ MRCP, CA 19-9 slightly elevated, AFP wnl  Tobacco abuse- refusing Nicotine patch, will try lozenges/ gum  Hypokalemia/ Hypomag- replace, schedule KCL, will start MagOx  Gi/ DVT proph\"     Subsequent database has included:  MRCP:  Impression:  1. Cystic lesion at the pancreas may reflect periampullary duodenal   diverticulum or pancreatic pseudocyst.  Correlative CT abdomen with IV and   oral contrast recommended. 2. A number of the MR series are degraded by breathing motion, blurring   detail. 3. Nonspecific dilated common bile duct without stricture or stone evident. This appears to drain at the possible duodenal diverticulum, or may be mildly   dilated because of extrinsic compression by pancreatic pseudocyst.   4. Hepatic steatosis and mild hepatomegaly. 5. Small volume bilateral pleural effusion with bibasilar consolidation. Pneumonia is a consideration.      Review of Systems:     Constitutional:  negative for chills, fevers, sweats  Respiratory:  negative for cough, dyspnea on exertion, shortness of breath, wheezing  Cardiovascular:  negative for chest pain, chest pressure/discomfort, lower extremity edema, palpitations  Gastrointestinal:  negative for abdominal pain, constipation, diarrhea, nausea, vomiting  Neurological:  negative for dizziness, headache    Medications:      Allergies:  No Known Allergies    Current Meds:   Scheduled Meds:    lactulose  20 g Oral TID    fluconazole  200 mg Oral Daily    [START ON 10/26/2020] predniSONE  20 mg Oral Daily    Followed by   Jaky Vogt ON 10/29/2020] predniSONE  10 mg Oral Daily    DULoxetine  60 mg Oral Daily    sodium chloride  30 mL Intravenous Once    potassium chloride  40 mEq Oral Daily    amoxicillin-clavulanate  1 tablet Oral 3 times per day    chlordiazePOXIDE  10 mg Oral TID    ipratropium-albuterol  1 ampule Inhalation Q4H WA    amLODIPine  5 mg Oral Daily    busPIRone  15 mg Oral Q6H    carBAMazepine  200 mg Oral TID    ferrous sulfate  325 mg Oral BID    folic acid  1 mg Oral Daily    gabapentin  200 mg Oral TID    hydrOXYzine  50 mg Oral TID    magnesium carbonate  54 mg Oral TID    propranolol  40 mg Oral BID    rOPINIRole  1 mg Oral Nightly    traZODone  100 mg Oral Nightly    vitamin B-1  100 mg Oral Daily    sodium chloride flush  10 mL Intravenous 2 times per day    famotidine  20 mg Oral BID    [Held by provider] enoxaparin  40 mg Subcutaneous Daily    multivitamin  1 tablet Oral Daily    budesonide-formoterol  2 puff Inhalation BID     Continuous Infusions:    sodium chloride 100 mL/hr at 10/21/20 2309     PRN Meds: metoprolol, potassium chloride **OR** potassium alternative oral replacement **OR** potassium chloride, magnesium sulfate, nicotine polacrilex, nicotine polacrilex, sodium chloride flush, oxyCODONE-acetaminophen, sodium chloride flush, acetaminophen **OR** [DISCONTINUED] acetaminophen, polyethylene glycol, promethazine **OR** ondansetron, nicotine, albuterol    Data:     Past Medical History:   has a past medical history of Astrocytoma ([facility] [address] - 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24, the patient underwent 2 surgical resections without measuring 2.4 x 2.2 x 1.5 cm. Further evaluation with MRI/MRCP with and without IV contrast is recommended. Differential considerations includes small pseudocyst, duodenal diverticulum or possibly pancreatic cystic neoplasm. Findings were not present on the 07/15/2019 study. Mri Abdomen W Wo Contrast Mrcp    Result Date: 10/19/2020  1. Cystic lesion at the pancreas may reflect periampullary duodenal diverticulum or pancreatic pseudocyst.  Correlative CT abdomen with IV and oral contrast recommended. 2. A number of the MR series are degraded by breathing motion, blurring detail. 3. Nonspecific dilated common bile duct without stricture or stone evident. This appears to drain at the possible duodenal diverticulum, or may be mildly dilated because of extrinsic compression by pancreatic pseudocyst. 4. Hepatic steatosis and mild hepatomegaly. 5. Small volume bilateral pleural effusion with bibasilar consolidation. Pneumonia is a consideration.  RECOMMENDATIONS: CT abdomen with IV and oral contrast       Physical Examination:        General appearance:  alert, cooperative and no distress  Mental Status:  oriented to person, place and time and normal affect  Lungs:  clear to auscultation bilaterally, normal effort  Heart:  regular rate and rhythm, no murmur  Abdomen:  soft, nontender, nondistended, normal bowel sounds, no masses, hepatomegaly, splenomegaly  Extremities:  no edema, redness, tenderness in the calves  Skin:  no gross lesions, rashes, induration    Assessment:        Hospital Problems           Last Modified POA    * (Principal) COPD exacerbation ([address] 10/13/2020 Yes    Essential hypertension 10/13/2020 Yes    Depression 10/13/2020 Yes    Hypokalemia 10/20/2020 Yes    Macrocytic anemia 10/20/2020 Yes    Hypomagnesemia 10/20/2020 Yes    Ambulatory dysfunction 10/20/2020 Yes    Seizures ([address] 10/24/2020 Yes    Alcohol withdrawal syndrome without complication ([address] 09/67/5527 Yes    Paresthesia of lower extremity 10/13/2020 Yes    Acute respiratory failure with hypoxia ([address] 10/16/2020 Yes    Pancreatic cyst 10/19/2020 Yes    Recurrent major depressive disorder ([address] 10/20/2020 Yes    Elevated CA 19-9 level 10/20/2020 Yes    Acute alcoholic intoxication without complication ([address] 22/94/0371 Yes    Acute alcoholic gastritis without hemorrhage 10/22/2020 Yes    Perineal mass, female 10/23/2020 Yes    Esophagitis candidal  10/23/2020 Yes    Condyloma 10/23/2020 Yes    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24, the patient underwent 2 surgical resections without known recurrence 10/23/2020 Yes    Alcoholism ([address] 10/23/2020 Yes    Acute metabolic encephalopathy 15/19/1454 Yes    Ammonia level elevated ([address] 10/24/2020 Yes          Plan:          Monitor leukocytosis (prednisone versus infection)  Cultures ordered  Lactulose initiated  Diflucan initiated to cover Candida esophagitis  Colorectal surgery for perineal mass, condyloma?   Check VDRL, negative  Check HIV screen, negative  Encourage compliance to oxygen and BiPAP  Encourage compliance to PT  Respiratory Therapy and Bronchodilators prn  Pulmonary evaluation and f/u as scheduled   Observe for further DTs  Thiamine  Ativan  Psych evaluation  and follow-up as scheduled, MDD  Anemia w/u on outpatient basis is suggested    Correct electrolyte abnormalities  Physical therapy and rehabilitation    Seizure precautions  Aspiration precautions  GI evaluation and follow-up, awaiting Bx's  Wound care: Buttock wound  , Hx of LVSD, outpatient echo   Colorectal surgery consulted, F/U for surgical removal as OP      Julio John MD  10/25/2020  2:12 PM

## 2020-10-25 NOTE — PLAN OF CARE
Problem: Falls - Risk of:  Goal: Will remain free from falls  Outcome: Met This Shift     Problem: Pain:  Description: Pain management should include both nonpharmacologic and pharmacologic interventions.   Goal: Pain level will decrease  Outcome: Met This Shift

## 2020-10-25 NOTE — FLOWSHEET NOTE
Writer paged Dr. Neville Weller regarding patient's labs. Writer also spoke with mother and updated on current treatment plan.   Thee Begum RN

## 2020-10-25 NOTE — PLAN OF CARE
Problem: Falls - Risk of:  Goal: Will remain free from falls  Description: Will remain free from falls  10/25/2020 1254 by Gallo Pickens RN  Outcome: Ongoing     Problem: Falls - Risk of:  Goal: Absence of physical injury  Description: Absence of physical injury  Outcome: Ongoing     Problem: Pain:  Goal: Control of acute pain  Description: Control of acute pain  Outcome: Ongoing     Problem: Pain:  Goal: Control of chronic pain  Description: Control of chronic pain  Outcome: Ongoing     Problem: Nutrition  Goal: Optimal nutrition therapy  Description: Nutrition Problem #1: Inadequate oral intake  Intervention: Food and/or Nutrient Delivery: Continue NPO(Start diet as able; recommend ensure enlive supplements TID as able)  Nutritional Goals: Restart diet within 24-72 hrs     Outcome: Ongoing     Problem: Breathing Pattern - Ineffective:  Goal: Ability to achieve and maintain a regular respiratory rate will improve  Description: Ability to achieve and maintain a regular respiratory rate will improve  Outcome: Ongoing     Problem: Gas Exchange - Impaired:  Goal: Levels of oxygenation will improve  Description: Levels of oxygenation will improve  Outcome: Ongoing     Problem: Skin Integrity:  Goal: Will show no infection signs and symptoms  Description: Will show no infection signs and symptoms  Outcome: Ongoing  Goal: Absence of new skin breakdown  Description: Absence of new skin breakdown  Outcome: Ongoing     Problem: Skin Integrity:  Goal: Absence of new skin breakdown  Description: Absence of new skin breakdown  Outcome: Ongoing

## 2020-10-25 NOTE — PROGRESS NOTES
Up as Tolerated, Seizure  Required Braces or Orthoses?: No  Position Activity Restriction  Other position/activity restrictions: pt on 02 n/c 4 L  Subjective   General  Chart Reviewed: Yes  Response To Previous Treatment: Patient with no complaints from previous session. Family / Caregiver Present: No  Subjective  Subjective: Pt in bed; agreeable to PT. Denies pain but states she is tired. Demos situational confusion; pt calling out repeatedly for her mother  General Comment  Comments: Pt sat EOB 5 minutes with min-modA; fatigued quickly and desatted to low 80s on supplemental O2. Pt returned to bed; had loose BM and required assistance cleaning up. Pt then completed bed exercises and session ended. Pain Screening  Patient Currently in Pain: Denies  Vital Signs  Patient Currently in Pain: Denies       Orientation  Orientation  Overall Orientation Status: Within Functional Limits  Cognition      Objective   Bed mobility  Rolling to Left: Moderate assistance, rolled for hygiene care after BM and to don brief  Rolling to Right: Moderate assistance  Supine to Sit: Moderate assistance  Sit to Supine: Moderate assistance  Scooting: Dependent/Total  Transfers  Sit to Stand: Unable to assess(unsafe to attempt due to desatting and poor sitting balance)        Balance  Posture: Fair  Sitting - Static: Fair;-(pt required Luis to maintain static balance with use of unilateral UE support)  Sitting - Dynamic: Poor;+(pt sat EOB 5' for core strengthening; demos posterior lean requiring increased level of assistance to maintain balance)      Exercises:  Seated: LAQs x 5 reps, mod-maxA for balance  Supine: heel slides, ankle pumps, SLR, hip abd.  10x each, AA/AROM cues to stay on task    Goals  Short term goals  Time Frame for Short term goals: 14 visits  Short term goal 1: Pt will be Sneha bed mobility  Short term goal 2: Pt will be Sneha transfers  Short term goal 3: Pt will be Sneha amb 300' RW  Short term goal 4: Pt will navigate 2 steps Sneha    Plan    Plan  Times per week: 5-6x/wk  Current Treatment Recommendations: Strengthening, Functional Mobility Training, Transfer Training, Balance Training, Endurance Training, Gait Training, Stair training, Home Exercise Program, Safety Education & Training, Patient/Caregiver Education & Training, Equipment Evaluation, Education, & procurement  Safety Devices  Type of devices: All fall risk precautions in place, Left in bed, Bed alarm in place, Telesitter in use, Nurse notified, Patient at risk for falls  Restraints  Initially in place: No  Restraints:  All 4 bed rails raised     Therapy Time   Individual Concurrent Group Co-treatment   Time In 0135         Time Out 0203         Minutes 28         Timed Code Treatment Minutes: Desire Patel [address], PTA

## 2020-10-25 NOTE — PROGRESS NOTES
PULMONARY & CRITICAL CARE MEDICINE CONSULT NOTE     Patient:  Frances Waters  MRN: [id number]  Admit date: 10/12/2020  Primary Care Physician: Bria Bernal MD  Consulting Physician: Kim Thayer MD  CODE Status: Full Code     HISTORY     CHIEF COMPLAINT/REASON FOR CONSULT: COPD with acute on chronic hypoxemic and hypercarbic respiratory failure      HISTORY OF PRESENT ILLNESS:  The patient is a 45 y.o. female with a past medical history of COPD presented to the PCP and noted to be hypoxic. Associated cough and bilateral lower extremity pain x 2-3 days. No relief with home bronchodilators. Smoking 1 ppd. Daily ETOH. Patient noted to be afebrile. Non tachycardic. Hemodynamically stable. Hypoxic to 84 % on RA. BMP unremarkable. . Trop negative. ETOH 244 with mild transaminitis. Macrocytic anemia with no leukocytosis. Covid negative. Negative Duplex b/l lower extremities. CXR are showing worsening right and left lower lobe infiltrates. Patient has been receiving ativan for alcohol withdrawal. Initial VBG 7.401 / 50.4 / 57.7 / 31.3. Patient currently placed on BiPAP at this time. Interval History  Afebrile.   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24, the patient underwent 2 surgical resections without known recurrence 10/23/2020    Closed fracture of lateral portion of left tibial plateau 4/8/3207    COPD (chronic obstructive pulmonary disease) (HCC)     Depression     bipolar, major depressive disorder, ptsd, anxiety    Hypertension     Pain, joint, ankle and foot      PAST SURGICAL HISTORY:        Procedure Laterality Date    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    COLONOSCOPY N/A 10/22/2020    COLONOSCOPY DIAGNOSTIC performed by Emre Shaw MD at [address] [facility] Left 7-3-13    ORIF tibial plateau    FRACTURE SURGERY Right     small finger metacarpal fracture    HYSTERECTOMY  2003    UPPER GASTROINTESTINAL ENDOSCOPY N/A 10/22/2020    EGD BIOPSY performed by Randi Pond MD at [facility]     FAMILY HISTORY:       Problem Relation Age of Onset    Other Father     Cancer Maternal Grandmother     Heart Disease Paternal Grandmother      SOCIAL HISTORY:   TOBACCO:   reports that she has been smoking cigarettes. She has a 30.00 pack-year smoking history. She has never used smokeless tobacco.  ETOH:  reports current alcohol use. DRUGS: reports current drug use. Drug: Marijuana. ALLERGIES:    No Known Allergies      HOME MEDICATIONS:  Prior to Admission medications    Medication Sig Start Date End Date Taking?  Authorizing Provider   tiotropium (SPIRIVA RESPIMAT) 1.25 MCG/ACT AERS inhaler Inhale 2 puffs into the lungs daily 10/12/20   Ludivina Sinclair MD   tiZANidine (ZANAFLEX) 4 MG tablet Take 1 tablet by mouth every 8 hours as needed (back pain) 10/12/20   Ludivina Sinclair MD   potassium chloride (KLOR-CON M) 20 MEQ extended release tablet Take 1 tablet by mouth daily 9/10/20   LOI Meredith CNP   magnesium carbonate (MAGONATE) 54 (Mag Equiv) MG/5ML LIQD oral liquid Take 5 mLs by mouth 3 times daily 7/16/20   Ludivina Sinclair MD   ferrous sulfate (IRON 325) 325 (65 Fe) MG tablet Take 1 tablet by mouth 2 times daily 7/16/20   Ludivina Sinclair MD   rOPINIRole (REQUIP) 1 MG tablet Take 1 tablet by mouth nightly 7/8/20   Ludivina Sinclair MD   ACETAMINOPHEN EXTRA STRENGTH 500 MG tablet Take 500 mg by mouth 3 times daily as needed 5/8/20   Historical Provider, MD   ibuprofen (ADVIL;MOTRIN) 800 MG tablet Take 800 mg by mouth 3 times daily as needed 5/15/20   Historical Provider, MD   propranolol (INDERAL) 40 MG tablet Take 40 mg by mouth 2 times daily 5/12/20   Historical Provider, MD   albuterol sulfate HFA (VENTOLIN HFA) 108 (90 Base) MCG/ACT inhaler Inhale 2 puffs into the lungs 4 times daily as needed for Wheezing 6/11/20   Ludivina Bryan MD   busPIRone (BUSPAR) 15 MG tablet Take 15 mg by mouth every 6 hours 6/11/20   Ludivina Bryan MD   escitalopram (LEXAPRO) 20 MG tablet Take 1.5 tablets by mouth daily 6/11/20   Ludivina Bryan MD   traZODone (DESYREL) 100 MG tablet Take 1 tablet by mouth nightly 6/11/20   Ludivina Bryan MD   carBAMazepine (TEGRETOL XR) 200 MG extended release tablet Take 1 tablet by mouth 3 times daily 6/11/20   Ludivina Bryan MD   amLODIPine (NORVASC) 5 MG tablet Take 1 tablet by mouth daily 6/11/20   Ludivina Bryan MD   gabapentin (NEURONTIN) 100 MG capsule Take 2 capsules by mouth 3 times daily for 180 days.  Intended supply: 90 days 6/11/20 12/8/20  Ludivina Bryan MD   hydrOXYzine (ATARAX) 50 MG tablet Take 1 tablet by mouth 3 times daily 6/11/20   Ludivina Bryan MD   acamprosate (CAMPRAL) 333 MG tablet Take 2 tablets by mouth 3 times daily 12/10/19 10/12/20  LOI Lux NP   vitamin B-1 (THIAMINE) 100 MG tablet Take 1 tablet by mouth daily 7/12/19   Ludivina Bryan MD   vitamin D (ERGOCALCIFEROL) 74916 units CAPS capsule Take 1 capsule by mouth once a week 6/19/19   Ludivina Bryan MD   folic acid (FOLVITE) 1 MG tablet Take 1 tablet by mouth daily 6/19/19   Ludivina rByan MD     IMMUNIZATIONS:  Most Recent Immunizations   Administered Date(s) Administered    Influenza Vaccine, unspecified formulation 10/18/2018    Influenza Virus Vaccine 09/10/2019    Influenza, Blanca Eng, IM, (6 mo and older Fluzone, Flulaval, Fluarix and 3 yrs and older Afluria) 09/10/2019    MMR 09/27/2006       REVIEW OF SYSTEMS:  Review of Systems -   General ROS: Completed and except as mentioned above were negative   Psychological ROS:  Completed and except as mentioned above were negative  Ophthalmic ROS:  Completed and except as mentioned above were negative  ENT ROS:  Completed and except as mentioned above were negative  Allergy and Immunology ROS:  Completed and rhythm, normal S1, S2, no murmurs, rubs, clicks or gallops  Abdomen - soft, nontender, nondistended, no masses or organomegaly  Neurological -following commands  Extremities -no pedal edema, no clubbing or cyanosis  Skin - normal coloration and turgor, no rashes, no suspicious skin lesions noted     DATA REVIEW     Medications: Current Inpatient  Scheduled Meds:   lactulose  20 g Oral TID    fluconazole  200 mg Oral Daily    [START ON 10/26/2020] predniSONE  20 mg Oral Daily    Followed by   Carlos Massey ON 10/29/2020] predniSONE  10 mg Oral Daily    DULoxetine  60 mg Oral Daily    sodium chloride  30 mL Intravenous Once    potassium chloride  40 mEq Oral Daily    amoxicillin-clavulanate  1 tablet Oral 3 times per day    chlordiazePOXIDE  10 mg Oral TID    ipratropium-albuterol  1 ampule Inhalation Q4H WA    amLODIPine  5 mg Oral Daily    busPIRone  15 mg Oral Q6H    carBAMazepine  200 mg Oral TID    ferrous sulfate  325 mg Oral BID    folic acid  1 mg Oral Daily    gabapentin  200 mg Oral TID    hydrOXYzine  50 mg Oral TID    magnesium carbonate  54 mg Oral TID    propranolol  40 mg Oral BID    rOPINIRole  1 mg Oral Nightly    traZODone  100 mg Oral Nightly    vitamin B-1  100 mg Oral Daily    sodium chloride flush  10 mL Intravenous 2 times per day    famotidine  20 mg Oral BID    [Held by provider] enoxaparin  40 mg Subcutaneous Daily    multivitamin  1 tablet Oral Daily    budesonide-formoterol  2 puff Inhalation BID     Continuous Infusions:   sodium chloride 100 mL/hr at 10/21/20 2309     INPUT/OUTPUT:  In: 440 [P.O.:440]  Out: -   Date 10/25/20 0000 - 10/25/20 2359   Shift 2180-8745 0577-1917 0607-2083 24 Hour Total   INTAKE   P.O.(mL/kg/hr) 200(0.4) 240(0.4)  440   Shift Total(mL/kg) 200(3) 240(3.6)  440(6.6)   OUTPUT   Shift Total(mL/kg)       Weight (kg) 67.1 67.1 67.1 67.1       LABS:-  ABG:   No results for input(s): POCPH, POCPCO2, POCPO2, POCHCO3, EKEW3BTW in the last 72 hours.  CBC:   Recent Labs     10/23/20  0723 10/24/20  0538 10/25/20  0903   WBC 16.7* 22.4* 24.5*   HGB 8.5* 7.7* 7.9*   HCT 29.6* 27.5* 26.0*   .3* 114.1* 105.7*   * 458* 463*   LYMPHOPCT 4* 3* 7*   RBC 2.59* 2.41* 2.46*   MCH 32.8 32.0 32.1   MCHC 28.7 28.0* 30.4   RDW 16.8* 16.7* 16.8*     BMP:   Recent Labs     10/23/20  0723 10/24/20  0538 10/25/20  0903   * 142 144   K 3.9 4.0 3.8   * 115* 116*   CO2 17* 20 20   BUN 6 6 7   CREATININE 0.45* 0.42* 0.54   GLUCOSE 89 107* 98     Liver Function Test:   Recent Labs     10/25/20  0903   PROT 5.1*   LABALBU 1.8*   ALT 15   AST 24   ALKPHOS 191*   BILITOT 0.36     Amylase/Lipase:  No results for input(s): AMYLASE, LIPASE in the last 72 hours. Coagulation Profile:   No results for input(s): INR, PROTIME, APTT in the last 72 hours. Cardiac Enzymes:  No results for input(s): CKTOTAL, CKMB, CKMBINDEX, TROPONINI in the last 72 hours. Lactic Acid:  No results found for: LACTA  BNP:   No results found for: BNP  D-Dimer:  Lab Results   Component Value Date    DDIMER 2.17 12/15/2018     Others:   Lab Results   Component Value Date    TSH 0.69 07/10/2020     Lab Results   Component Value Date    SEDRATE 53 (H) 10/20/2020    CRP 97.7 (H) 10/20/2020     No results found for: Yimi Dashawner  Lab Results   Component Value Date    IRON 30 (L) 07/10/2020    TIBC 164 (L) 07/10/2020    FERRITIN 223 (H) 07/10/2020     No results found for: SPEP, UPEP  Lab Results   Component Value Date     123 10/19/2020     Microbiology:    Pathology:    Radiology Reports:  CT HEAD WO CONTRAST   Final Result   Stable CT brain with no acute intracranial abnormality. CT ABDOMEN PELVIS W IV CONTRAST Additional Contrast? Oral   Final Result   1. Pancreatic head cyst is demonstrated, which may represent a pseudocyst or   possibly side branch IPMN. No suspicious features were demonstrated within   this lesion on recent MRI.   This finding may resulting compressive affects on   the adjacent bile duct, as described on recent MRI. As such, short-term   imaging follow-up in 3-6 months is suggested. 2.  Mild peripancreatic edema suggestive of mild acute pancreatitis. 3.  Pleural effusions and dependent airspace disease, favoring atelectasis. 4.  Bilateral mild hydroureteronephrosis to the level of the bladder, which   is distended. This may be due to a functional versus mechanical bladder   outlet obstruction and resulting hydronephrosis. Punctate left   nephrolithiasis is noted without stone in either ureter or the bladder. 5.  Findings consistent with hepatic steatosis. 6.  Mild edematous appearance of the distal sigmoid and rectum, which may be   accentuated by underdistention. Possibility of colitis should be considered. 7.  Chronic appearing T11 compression deformity. MRI ABDOMEN W WO CONTRAST MRCP   Final Result   1. Cystic lesion at the pancreas may reflect periampullary duodenal   diverticulum or pancreatic pseudocyst.  Correlative CT abdomen with IV and   oral contrast recommended. 2. A number of the MR series are degraded by breathing motion, blurring   detail. 3. Nonspecific dilated common bile duct without stricture or stone evident. This appears to drain at the possible duodenal diverticulum, or may be mildly   dilated because of extrinsic compression by pancreatic pseudocyst.   4. Hepatic steatosis and mild hepatomegaly. 5. Small volume bilateral pleural effusion with bibasilar consolidation. Pneumonia is a consideration. RECOMMENDATIONS:   CT abdomen with IV and oral contrast         US LIVER   Final Result   *Fatty infiltration of the liver. *Dilatation of the CBD measuring 11.3 mm. Gallbladder appears unremarkable. In addition, there appears to be a cystic lesion involving the head of the   pancreas measuring 2.4 x 2.2 x 1.5 cm.   Further evaluation with MRI/MRCP with   and without IV contrast is recommended. Differential considerations includes   small pseudocyst, duodenal diverticulum or possibly pancreatic cystic   neoplasm. Findings were not present on the 07/15/2019 study. XR CHEST PORTABLE   Final Result   Mild interval improvement in scattered pulmonary opacities. Decrease in size   of pleural effusions, now minimal.         XR CHEST PORTABLE   Final Result   Small bilateral pleural effusions. Worsening right lower lobe infiltrate and   possibly new left lower lobe infiltrate. XR CHEST PORTABLE   Final Result   Small right pleural effusion. Linear opacity in the right lung base,   atelectasis versus pneumonia. VL DUP LOWER EXTREMITY VENOUS BILATERAL   Final Result      XR CHEST PORTABLE   Final Result   Clear chest, stable when compared to previous.          XR CHEST PORTABLE   Final Result   Negative chest.             Pulmonary Function test:    Polysomnogram:    Echocardiogram:   Results for orders placed during the hospital encounter of 12/13/19   ECHO Complete 2D W Doppler W Color    Narrative Transthoracic Echocardiography Report (TTE)     Patient Name Ashley Lyman     Date of Study               12/17/2019                PAM WYNN      Date of      1970  Gender                      Female   Birth      Age          47 year(s)  Race                              Room Number  0162        Height:                     65 inch, 165.1 cm      Corporate ID [id number]    Weight:                     133 pounds, 60.3 kg   #      Patient Acct [de-identified]   BSA:          1.66 m^2      BMI:      22.13   #                                                              kg/m^2      MR #         [id number]     Wilder Edward      Accession #  [id number]   Interpreting Physician      [facility]      Fellow                   Referring Nurse                            Practitioner      Interpreting             Referring Physician and function normal.  Aortic valve is trileaflet. No aortic insufficiency. No aortic stenosis. Tricuspid Valve  Normal tricuspid valve leaflets. Trivial tricuspid regurgitation. No tricuspid stenosis. Insignificant tricuspid regurgitation, unable to estimate RVSP. Pulmonic Valve  Pulmonic valve is normal in structure and function. No pulmonic insufficiency. No evidence of pulmonic stenosis. Pericardial Effusion  No significant pericardial effusion is seen. Miscellaneous  Normal aortic root dimension. The ascending aorta is normal in size. E/E' = 10.55 . IVC Increased diameter, but still has inspiratory variation suggesting upper  normal or mildly elevated RA filling pressure (i.e. CVP) .     M-mode / 2D Measurements & Calculations:      LVIDd:4.6 cm(3.7 - 5.6 cm)       Diastolic MVJNXN:59.43 ml   LVIDs:3.4 cm(2.2 - 4.0 cm)       Systolic EYTUAH:30.10 ml   IVSd:1.1 cm(0.6 - 1.1 cm)        Aortic Root:3.2 cm(2.0 - 3.7 cm)   LVPWd:1.1 cm(0.6 - 1.1 cm)       LA Dimension: 3.6 cm(1.9 - 4.0 cm)   Fractional Shortenin.09 %    LA volume/Index: 48.37 ml /29m^2   Calculated LVEF (%): 38.8 %      LVOT:2.1 cm                                    RVDd:3.2 cm      Mitral:                             Aortic      Peak E-Wave: 1.11 m/s               Peak Velocity: 1.23 m/s                                       Mean Velocity: 0.85 m/s   Peak Gradient: 4.93 mmHg            Peak Gradient: 6.05 mmHg   Mean Gradient: 3 mmHg               Mean Gradient: 4 mmHg                                          Area (continuity): 2.49 cm^2   Area (continuity): 2.4 cm^2         AV VTI: 25.9 cm   Mean Velocity: 0.71 m/s                                          Pulmonic:                                          Peak Velocity: 0.86 m/s                                       Peak Gradient: 2.92 mmHg     Diastology / Tissue Doppler  Septal Wall E' velocity:0.13 m/s  Septal Wall E/E':8.7  Lateral Wall E' velocity:0.09 m/s  Lateral Wall

## 2020-10-25 NOTE — CARE COORDINATION
Transitional planning-attempted to call [facility] with [facility]-couldn't leave message. Previous note has [facility]-precert obtained. Already had COVID test    1730 called Barbara with [facility]-she stated precert good until 71-. Patient will need another COVID test within 72 hours of going to facility.

## 2020-10-26 VITALS
OXYGEN SATURATION: 99 % | HEART RATE: 76 BPM | DIASTOLIC BLOOD PRESSURE: 77 MMHG | WEIGHT: 147.6 LBS | HEIGHT: 65 IN | RESPIRATION RATE: 19 BRPM | TEMPERATURE: 98.6 F | SYSTOLIC BLOOD PRESSURE: 140 MMHG | BODY MASS INDEX: 24.59 KG/M2

## 2020-10-26 LAB
ABSOLUTE EOS #: 0 K/UL (ref 0–0.44)
ABSOLUTE IMMATURE GRANULOCYTE: 0.19 K/UL (ref 0–0.3)
ABSOLUTE LYMPH #: 1.94 K/UL (ref 1.1–3.7)
ABSOLUTE MONO #: 0.78 K/UL (ref 0.1–1.2)
ALBUMIN SERPL-MCNC: 1.8 G/DL (ref 3.5–5.2)
ALBUMIN/GLOBULIN RATIO: 0.5 (ref 1–2.5)
ALP BLD-CCNC: 207 U/L (ref 35–104)
ALT SERPL-CCNC: 14 U/L (ref 5–33)
AMMONIA: 60 UMOL/L (ref 11–51)
ANION GAP SERPL CALCULATED.3IONS-SCNC: 11 MMOL/L (ref 9–17)
AST SERPL-CCNC: 29 U/L
BASOPHILS # BLD: 0 % (ref 0–2)
BASOPHILS ABSOLUTE: 0 K/UL (ref 0–0.2)
BILIRUB SERPL-MCNC: 0.31 MG/DL (ref 0.3–1.2)
BUN BLDV-MCNC: 9 MG/DL (ref 6–20)
BUN/CREAT BLD: ABNORMAL (ref 9–20)
CALCIUM SERPL-MCNC: 8.8 MG/DL (ref 8.6–10.4)
CHLORIDE BLD-SCNC: 114 MMOL/L (ref 98–107)
CO2: 19 MMOL/L (ref 20–31)
CREAT SERPL-MCNC: 0.58 MG/DL (ref 0.5–0.9)
DIFFERENTIAL TYPE: ABNORMAL
EOSINOPHILS RELATIVE PERCENT: 0 % (ref 1–4)
GFR AFRICAN AMERICAN: >60 ML/MIN
GFR NON-AFRICAN AMERICAN: >60 ML/MIN
GFR SERPL CREATININE-BSD FRML MDRD: ABNORMAL ML/MIN/{1.73_M2}
GFR SERPL CREATININE-BSD FRML MDRD: ABNORMAL ML/MIN/{1.73_M2}
GLUCOSE BLD-MCNC: 76 MG/DL (ref 70–99)
HCT VFR BLD CALC: 26.4 % (ref 36.3–47.1)
HEMOGLOBIN: 7.6 G/DL (ref 11.9–15.1)
IMMATURE GRANULOCYTES: 1 %
INR BLD: 1.1
LYMPHOCYTES # BLD: 10 % (ref 24–43)
MCH RBC QN AUTO: 31.9 PG (ref 25.2–33.5)
MCHC RBC AUTO-ENTMCNC: 28.8 G/DL (ref 28.4–34.8)
MCV RBC AUTO: 110.9 FL (ref 82.6–102.9)
MONOCYTES # BLD: 4 % (ref 3–12)
MORPHOLOGY: ABNORMAL
MORPHOLOGY: ABNORMAL
NRBC AUTOMATED: 0 PER 100 WBC
PDW BLD-RTO: 16.9 % (ref 11.8–14.4)
PLATELET # BLD: 497 K/UL (ref 138–453)
PLATELET ESTIMATE: ABNORMAL
PMV BLD AUTO: 11.3 FL (ref 8.1–13.5)
POTASSIUM SERPL-SCNC: 3.7 MMOL/L (ref 3.7–5.3)
PROTHROMBIN TIME: 11.3 SEC (ref 9–12)
RBC # BLD: 2.38 M/UL (ref 3.95–5.11)
RBC # BLD: ABNORMAL 10*6/UL
SARS-COV-2, RAPID: NORMAL
SARS-COV-2, RAPID: NOT DETECTED
SARS-COV-2: NORMAL
SARS-COV-2: NOT DETECTED
SEG NEUTROPHILS: 85 % (ref 36–65)
SEGMENTED NEUTROPHILS ABSOLUTE COUNT: 16.49 K/UL (ref 1.5–8.1)
SODIUM BLD-SCNC: 144 MMOL/L (ref 135–144)
SOURCE: NORMAL
SOURCE: NORMAL
SURGICAL PATHOLOGY REPORT: NORMAL
TOTAL PROTEIN: 5.1 G/DL (ref 6.4–8.3)
WBC # BLD: 19.4 K/UL (ref 3.5–11.3)
WBC # BLD: ABNORMAL 10*3/UL

## 2020-10-26 PROCEDURE — 94660 CPAP INITIATION&MGMT: CPT

## 2020-10-26 PROCEDURE — 6370000000 HC RX 637 (ALT 250 FOR IP): Performed by: INTERNAL MEDICINE

## 2020-10-26 PROCEDURE — U0002 COVID-19 LAB TEST NON-CDC: HCPCS

## 2020-10-26 PROCEDURE — 97530 THERAPEUTIC ACTIVITIES: CPT

## 2020-10-26 PROCEDURE — 97110 THERAPEUTIC EXERCISES: CPT

## 2020-10-26 PROCEDURE — U0003 INFECTIOUS AGENT DETECTION BY NUCLEIC ACID (DNA OR RNA); SEVERE ACUTE RESPIRATORY SYNDROME CORONAVIRUS 2 (SARS-COV-2) (CORONAVIRUS DISEASE [COVID-19]), AMPLIFIED PROBE TECHNIQUE, MAKING USE OF HIGH THROUGHPUT TECHNOLOGIES AS DESCRIBED BY CMS-2020-01-R: HCPCS

## 2020-10-26 PROCEDURE — 85025 COMPLETE CBC W/AUTO DIFF WBC: CPT

## 2020-10-26 PROCEDURE — 82140 ASSAY OF AMMONIA: CPT

## 2020-10-26 PROCEDURE — 97116 GAIT TRAINING THERAPY: CPT

## 2020-10-26 PROCEDURE — 94761 N-INVAS EAR/PLS OXIMETRY MLT: CPT

## 2020-10-26 PROCEDURE — 85610 PROTHROMBIN TIME: CPT

## 2020-10-26 PROCEDURE — 2700000000 HC OXYGEN THERAPY PER DAY

## 2020-10-26 PROCEDURE — 80053 COMPREHEN METABOLIC PANEL: CPT

## 2020-10-26 PROCEDURE — 2580000003 HC RX 258: Performed by: INTERNAL MEDICINE

## 2020-10-26 PROCEDURE — 99232 SBSQ HOSP IP/OBS MODERATE 35: CPT | Performed by: INTERNAL MEDICINE

## 2020-10-26 PROCEDURE — 36415 COLL VENOUS BLD VENIPUNCTURE: CPT

## 2020-10-26 PROCEDURE — 94640 AIRWAY INHALATION TREATMENT: CPT

## 2020-10-26 PROCEDURE — 99232 SBSQ HOSP IP/OBS MODERATE 35: CPT | Performed by: FAMILY MEDICINE

## 2020-10-26 RX ORDER — PREDNISONE 10 MG/1
10 TABLET ORAL DAILY
Qty: 3 TABLET | Refills: 0 | Status: SHIPPED | OUTPATIENT
Start: 2020-10-31 | End: 2020-11-03

## 2020-10-26 RX ORDER — AMOXICILLIN AND CLAVULANATE POTASSIUM 500; 125 MG/1; MG/1
1 TABLET, FILM COATED ORAL 2 TIMES DAILY
Qty: 4 TABLET | Refills: 0 | Status: SHIPPED | OUTPATIENT
Start: 2020-10-26 | End: 2020-10-28

## 2020-10-26 RX ORDER — CHLORDIAZEPOXIDE HYDROCHLORIDE 5 MG/1
5 CAPSULE, GELATIN COATED ORAL 3 TIMES DAILY PRN
Qty: 3 CAPSULE | Refills: 0 | Status: SHIPPED | OUTPATIENT
Start: 2020-10-26 | End: 2020-11-25

## 2020-10-26 RX ORDER — PREDNISONE 20 MG/1
20 TABLET ORAL DAILY
Qty: 3 TABLET | Refills: 0 | Status: SHIPPED | OUTPATIENT
Start: 2020-10-27 | End: 2020-10-30

## 2020-10-26 RX ORDER — LACTULOSE 10 G/15ML
20 SOLUTION ORAL 3 TIMES DAILY
Qty: 1 BOTTLE | Refills: 1 | Status: SHIPPED | OUTPATIENT
Start: 2020-10-26 | End: 2020-12-07 | Stop reason: SDUPTHER

## 2020-10-26 RX ADMIN — FAMOTIDINE 20 MG: 20 TABLET ORAL at 00:38

## 2020-10-26 RX ADMIN — CHLORDIAZEPOXIDE HYDROCHLORIDE 10 MG: 5 CAPSULE ORAL at 14:49

## 2020-10-26 RX ADMIN — GABAPENTIN 200 MG: 100 CAPSULE ORAL at 14:15

## 2020-10-26 RX ADMIN — GABAPENTIN 200 MG: 100 CAPSULE ORAL at 08:46

## 2020-10-26 RX ADMIN — BUDESONIDE AND FORMOTEROL FUMARATE DIHYDRATE 2 PUFF: 160; 4.5 AEROSOL RESPIRATORY (INHALATION) at 07:26

## 2020-10-26 RX ADMIN — HYDROXYZINE HYDROCHLORIDE 50 MG: 25 TABLET, FILM COATED ORAL at 14:49

## 2020-10-26 RX ADMIN — PREDNISONE 20 MG: 20 TABLET ORAL at 08:45

## 2020-10-26 RX ADMIN — AMOXICILLIN AND CLAVULANATE POTASSIUM 1 TABLET: 500; 125 TABLET, FILM COATED ORAL at 08:46

## 2020-10-26 RX ADMIN — BUSPIRONE HYDROCHLORIDE 15 MG: 15 TABLET ORAL at 00:38

## 2020-10-26 RX ADMIN — Medication 54 MG: at 14:50

## 2020-10-26 RX ADMIN — PROPRANOLOL HYDROCHLORIDE 40 MG: 40 TABLET ORAL at 08:45

## 2020-10-26 RX ADMIN — POTASSIUM CHLORIDE 40 MEQ: 1500 TABLET, EXTENDED RELEASE ORAL at 08:44

## 2020-10-26 RX ADMIN — FERROUS SULFATE TAB EC 325 MG (65 MG FE EQUIVALENT) 325 MG: 325 (65 FE) TABLET DELAYED RESPONSE at 08:45

## 2020-10-26 RX ADMIN — FLUCONAZOLE 200 MG: 200 TABLET ORAL at 08:58

## 2020-10-26 RX ADMIN — CARBAMAZEPINE 200 MG: 100 TABLET, EXTENDED RELEASE ORAL at 14:15

## 2020-10-26 RX ADMIN — SODIUM CHLORIDE, PRESERVATIVE FREE 10 ML: 5 INJECTION INTRAVENOUS at 08:47

## 2020-10-26 RX ADMIN — AMLODIPINE BESYLATE 5 MG: 10 TABLET ORAL at 08:46

## 2020-10-26 RX ADMIN — FAMOTIDINE 20 MG: 20 TABLET ORAL at 08:46

## 2020-10-26 RX ADMIN — GABAPENTIN 200 MG: 100 CAPSULE ORAL at 00:37

## 2020-10-26 RX ADMIN — BUSPIRONE HYDROCHLORIDE 15 MG: 15 TABLET ORAL at 08:46

## 2020-10-26 RX ADMIN — ROPINIROLE HYDROCHLORIDE 1 MG: 1 TABLET, FILM COATED ORAL at 00:38

## 2020-10-26 RX ADMIN — HYDROXYZINE HYDROCHLORIDE 50 MG: 25 TABLET, FILM COATED ORAL at 08:44

## 2020-10-26 RX ADMIN — LACTULOSE 20 G: 20 SOLUTION ORAL at 08:47

## 2020-10-26 RX ADMIN — Medication 54 MG: at 08:46

## 2020-10-26 RX ADMIN — THERA TABS 1 TABLET: TAB at 08:45

## 2020-10-26 RX ADMIN — CARBAMAZEPINE 200 MG: 100 TABLET, EXTENDED RELEASE ORAL at 08:46

## 2020-10-26 RX ADMIN — FOLIC ACID 1 MG: 1 TABLET ORAL at 08:45

## 2020-10-26 RX ADMIN — AMOXICILLIN AND CLAVULANATE POTASSIUM 1 TABLET: 500; 125 TABLET, FILM COATED ORAL at 06:02

## 2020-10-26 RX ADMIN — DULOXETINE HYDROCHLORIDE 60 MG: 30 CAPSULE, DELAYED RELEASE ORAL at 08:45

## 2020-10-26 RX ADMIN — FERROUS SULFATE TAB EC 325 MG (65 MG FE EQUIVALENT) 325 MG: 325 (65 FE) TABLET DELAYED RESPONSE at 00:38

## 2020-10-26 RX ADMIN — IPRATROPIUM BROMIDE AND ALBUTEROL SULFATE 1 AMPULE: .5; 3 SOLUTION RESPIRATORY (INHALATION) at 07:26

## 2020-10-26 RX ADMIN — Medication 100 MG: at 08:44

## 2020-10-26 RX ADMIN — IPRATROPIUM BROMIDE AND ALBUTEROL SULFATE 1 AMPULE: .5; 3 SOLUTION RESPIRATORY (INHALATION) at 16:13

## 2020-10-26 RX ADMIN — BUSPIRONE HYDROCHLORIDE 15 MG: 15 TABLET ORAL at 14:49

## 2020-10-26 ASSESSMENT — PAIN SCALES - GENERAL: PAINLEVEL_OUTOF10: 3

## 2020-10-26 ASSESSMENT — PAIN DESCRIPTION - PROGRESSION
CLINICAL_PROGRESSION: NOT CHANGED

## 2020-10-26 ASSESSMENT — PAIN DESCRIPTION - PAIN TYPE: TYPE: ACUTE PAIN

## 2020-10-26 ASSESSMENT — PAIN DESCRIPTION - FREQUENCY: FREQUENCY: CONTINUOUS

## 2020-10-26 ASSESSMENT — PAIN DESCRIPTION - LOCATION: LOCATION: LEG

## 2020-10-26 ASSESSMENT — PAIN - FUNCTIONAL ASSESSMENT: PAIN_FUNCTIONAL_ASSESSMENT: ACTIVITIES ARE NOT PREVENTED

## 2020-10-26 ASSESSMENT — PAIN DESCRIPTION - DESCRIPTORS: DESCRIPTORS: ACHING

## 2020-10-26 ASSESSMENT — PAIN DESCRIPTION - ORIENTATION: ORIENTATION: RIGHT;LEFT

## 2020-10-26 ASSESSMENT — PAIN DESCRIPTION - ONSET: ONSET: ON-GOING

## 2020-10-26 NOTE — PROGRESS NOTES
Physical Therapy  Facility/Department: [facility] CAR 2  Daily Treatment Note  NAME: Magda Knutson  : 1970  MRN: [id number]    Date of Service: 10/26/2020    Discharge Recommendations:  Patient would benefit from continued therapy after discharge     PT Equipment Recommendations  Equipment Needed: CTA    Assessment     Body structures, Functions, Activity limitations: Decreased functional mobility ; Decreased endurance;Decreased strength;Decreased balance;Decreased ADL status; Decreased cognition;Decreased safe awareness;Decreased posture  Assessment: Pt mildly confused; able to follow some simple commands. Poor sitting balance, pt demonstrates a R lean requiring min-modA to maintain upright position while EOB. Pt will require continued PT due to impaired functional mobility. Prognosis: Fair  PT Education: General Safety; Functional Mobility Training;Home Exercise Program  Patient Education: pursed lip breathing, importance of mobility  REQUIRES PT FOLLOW UP: Yes  Activity Tolerance  Activity Tolerance: Patient limited by endurance; Patient limited by fatigue         Patient Diagnosis(es): The primary encounter diagnosis was COPD exacerbation ([facility] [address] Diagnoses of Hypoxia and Acute alcoholic intoxication without complication ([address] were also pertinent to this visit. has a past medical history of Astrocytoma ([facility] [address] - 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24, the patient underwent 2 surgical resections without known recurrence, Closed fracture of lateral portion of left tibial plateau, COPD (chronic obstructive pulmonary disease) ([address] Depression, Hypertension, and Pain, joint, ankle and foot. has a past surgical history that includes Hysterectomy (); Brain tumor excision (); fracture surgery (Left, 7-3-13); fracture surgery (Right); Upper gastrointestinal endoscopy (N/A, 10/22/2020); and Colonoscopy (N/A, 10/22/2020).     Restrictions  Restrictions/Precautions  Restrictions/Precautions: General Precautions, Fall Risk, Up as Tolerated, Seizure  Required Braces or Orthoses?: No  Position Activity Restriction  Other position/activity restrictions: pt on 02 n/c 4 L  Subjective   Pt in bed sleeping. Pt's mother is present. Pt has no c/o pain. Orientation    Overall Orientation Status: Within Functional Limits  Objective   Bed mobility  Rolling to Left: Moderate assistance, rolled for hygiene care after voiding and to don brief  Rolling to Right: Moderate assistance  Supine to Sit: Moderate assistance  Sit to Supine: Moderate assistance  Scooting: Dependent/Total   Transfers  Sit to stand: Max A x 2  Stand to sit: Max A x 2  Pivot Transfer: Max A x 2  Ambulation: No, Pt unable to stand up. Bed to chair: Max A x 2   Exercises:  Upper extremity exercises: Bicep curl, shoulder flexion/extension, punches. Reps: 5 x each AROM with little ROM d/t pt's weakness. Seated LE exercise program: Long Arc Quads,heel/toe raises, and marches. Reps: 5 x each AROM with limited ROM d/t pt's weakness. Goals  Short term goals  Time Frame for Short term goals: 14 visits  Short term goal 1: Pt will be S[place] bed mobility  Short term goal 2: Pt will be [place] transfers  Short term goal 3: Pt will be [place] amb 300' RW  Short term goal 4: Pt will navigate 2 steps Sneha    Plan    Plan  Times per week: 5-6x/wk  Current Treatment Recommendations: Strengthening, Functional Mobility Training, Transfer Training, Balance Training, Endurance Training, Gait Training, Stair training, Home Exercise Program, Safety Education & Training, Patient/Caregiver Education & Training, Equipment Evaluation, Education, & procurement  Safety Devices  Type of devices: All fall risk precautions in place, Left in alarmed chair, Bed alarm in place,Nurse notified, Patient at risk for falls  Restraints  Initially in place: No  Restraints:  All 4 bed rails raised     Therapy Time   Individual Concurrent Group Co-treatment   Time In  1015         Time Out  1100         Minutes  45 Jessica Cisneros, PTA
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
## 2020-10-26 NOTE — CARE COORDINATION
Discharge order noted. Notified Barbara with [employer]*, they can take today, but needs repeat covid test.  I will arrange transportation    [phone] transportation arranged for 6pm pickup per Jorge Wynn at [facility]. Notified Barbara at [employer]*, states we don't need to wait for Covid swab result, they can look it up. Patient updated, agreeable. Patient's mother Ermelinda Post called and updated, all questions answered.   Adelfo Guillen RN updated and given number for report    Discharge [address] [facility] Case Management Department  Written by: Kaylene Mendiola RN    Patient Name: Kin Khan  Attending Provider: Amina Salter MD  Admit Date: 10/12/2020  7:48 PM  MRN: [id number]  Account: [de-identified]                     : 1970  Discharge Date:  10/26/2020        Disposition: [facility]    Kaylene Mendiola RN

## 2020-10-26 NOTE — PROGRESS NOTES
breath going on for more than a year.  Has been using albuterol inhaler as needed.  Continues to smoke about 1 pack/day.  Drinks alcohol every day: Rum. Known history of hypertension, depression, COPD, excision of Astrocytoma. - ED evaluation showed patient was afebrile, hypoxic to 84% on room air, intoxicated with alcohol level 244, anemic hemoglobin 9.4, COVID rapid NAAT:  Negative, chest x-ray was unremarkable no pneumonia, high sensitive troponin 9, EKG with nonspecific ST-T changes. Lower extremity venous Dopplers negative for DVT.    Initial findings and plan have included:  Acute COPD exacerbation- Bipap prn, oxygen, Pulmonary following, on Augmentin, Prednisone  Acute resp failure- Bipap  Etoh abuse- experiencing withdrawal, on Ciwa protocol, Librium  Depression- Psych consult   Pancreatic cyst- dw GI, needs MRI/ MRCP, CA 19-9 slightly elevated, AFP wnl  Tobacco abuse- refusing Nicotine patch, will try lozenges/ gum  Hypokalemia/ Hypomag- replace, schedule KCL, will start MagOx  Gi/ DVT proph\"     Subsequent database has included:  MRCP:  Impression:  1. Cystic lesion at the pancreas may reflect periampullary duodenal   diverticulum or pancreatic pseudocyst.  Correlative CT abdomen with IV and   oral contrast recommended. 2. A number of the MR series are degraded by breathing motion, blurring   detail. 3. Nonspecific dilated common bile duct without stricture or stone evident. This appears to drain at the possible duodenal diverticulum, or may be mildly   dilated because of extrinsic compression by pancreatic pseudocyst.   4. Hepatic steatosis and mild hepatomegaly. 5. Small volume bilateral pleural effusion with bibasilar consolidation. Pneumonia is a consideration.      Review of Systems:     Constitutional:  negative for chills, fevers, sweats  Respiratory:  negative for cough, dyspnea on exertion, shortness of breath, wheezing  Cardiovascular:  negative for chest pain, chest pressure/discomfort, lower extremity edema, palpitations  Gastrointestinal:  negative for abdominal pain, constipation, diarrhea, nausea, vomiting  Neurological:  negative for dizziness, headache    Medications:      Allergies:  No Known Allergies    Current Meds:   Scheduled Meds:    lactulose  20 g Oral TID    fluconazole  200 mg Oral Daily    predniSONE  20 mg Oral Daily    Followed by   Juan Curran ON 10/29/2020] predniSONE  10 mg Oral Daily    DULoxetine  60 mg Oral Daily    sodium chloride  30 mL Intravenous Once    potassium chloride  40 mEq Oral Daily    amoxicillin-clavulanate  1 tablet Oral 3 times per day    chlordiazePOXIDE  10 mg Oral TID    ipratropium-albuterol  1 ampule Inhalation Q4H WA    amLODIPine  5 mg Oral Daily    busPIRone  15 mg Oral Q6H    carBAMazepine  200 mg Oral TID    ferrous sulfate  325 mg Oral BID    folic acid  1 mg Oral Daily    gabapentin  200 mg Oral TID    hydrOXYzine  50 mg Oral TID    magnesium carbonate  54 mg Oral TID    propranolol  40 mg Oral BID    rOPINIRole  1 mg Oral Nightly    traZODone  100 mg Oral Nightly    vitamin B-1  100 mg Oral Daily    sodium chloride flush  10 mL Intravenous 2 times per day    famotidine  20 mg Oral BID    [Held by provider] enoxaparin  40 mg Subcutaneous Daily    multivitamin  1 tablet Oral Daily    budesonide-formoterol  2 puff Inhalation BID     Continuous Infusions:    sodium chloride 100 mL/hr at 10/21/20 2309     PRN Meds: metoprolol, potassium chloride **OR** potassium alternative oral replacement **OR** potassium chloride, magnesium sulfate, nicotine polacrilex, nicotine polacrilex, sodium chloride flush, oxyCODONE-acetaminophen, sodium chloride flush, acetaminophen **OR** [DISCONTINUED] acetaminophen, polyethylene glycol, promethazine **OR** ondansetron, nicotine, albuterol    Data:     Past Medical History:   has a past medical history of Astrocytoma ([facility] [address] - 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24, the patient underwent 2 surgical resections without known recurrence, Closed fracture of lateral portion of left tibial plateau, COPD (chronic obstructive pulmonary disease) ([address] Depression, Hypertension, and Pain, joint, ankle and foot. Social History:   reports that she has been smoking cigarettes. She has a 30.00 pack-year smoking history. She has never used smokeless tobacco. She reports current alcohol use. She reports current drug use. Drug: Marijuana. Family History:   Family History   Problem Relation Age of Onset    Other Father     Cancer Maternal Grandmother     Heart Disease Paternal Grandmother        Vitals:  /77   Pulse 73   Temp 98.6 °F (37 °C) (Oral)   Resp 23   Ht 5' 5\" (1.651 m)   Wt 147 lb 9.6 oz (67 kg)   SpO2 91%   BMI 24.56 kg/m²   Temp (24hrs), Av.4 °F (36.9 °C), Min:98.2 °F (36.8 °C), Max:98.6 °F (37 °C)    No results for input(s): POCGLU in the last 72 hours. I/O (24Hr):     Intake/Output Summary (Last 24 hours) at 10/26/2020 1415  Last data filed at 10/26/2020 1028  Gross per 24 hour   Intake 405 ml   Output --   Net 405 ml       Labs:  Hematology:  Recent Labs     10/24/20  0538 10/25/20  0903 10/26/20  0515 10/26/20  1037   WBC 22.4* 24.5* 19.4*  --    RBC 2.41* 2.46* 2.38*  --    HGB 7.7* 7.9* 7.6*  --    HCT 27.5* 26.0* 26.4*  --    .1* 105.7* 110.9*  --    MCH 32.0 32.1 31.9  --    MCHC 28.0* 30.4 28.8  --    RDW 16.7* 16.8* 16.9*  --    * 463* 497*  --    MPV 11.2 11.2 11.3  --    INR  --   --   --  1.1     Chemistry:  Recent Labs     10/24/20  0538 10/25/20  0903 10/26/20  0515    144 144   K 4.0 3.8 3.7   * 116* 114*   CO2 20 20 19*   GLUCOSE 107* 98 76   BUN 6 7 9   CREATININE 0.42* 0.54 0.58   ANIONGAP 7* 8* 11   LABGLOM >60 >60 >60   GFRAA >60 >60 >60   CALCIUM 8.5* 9.0 8.8   CAION  --  1.42*  --      Recent Labs     10/24/20  0538 10/24/20  1256 10/25/20  0903 10/26/20  0515   PROT 5.0*  --  5.1* 5.1*   LABALBU 1.8*  --  1.8* 1.8*   AST 29  -- 24 29   ALT 18  --  15 14   ALKPHOS 182*  --  191* 207*   BILITOT 0.39  --  0.36 0.31   AMMONIA  --  80* 79*  --      ABG:  Lab Results   Component Value Date    POCPH 7.452 10/16/2020    POCPCO2 37.0 10/16/2020    POCPO2 80.7 10/16/2020    POCHCO3 25.9 10/16/2020    NBEA NOT REPORTED 10/16/2020    PBEA 2 10/16/2020    VDI6JSZ 27 10/16/2020    IIHK0DJS 96 10/16/2020    FIO2 55.0 10/16/2020     Lab Results   Component Value Date/Time    SPECIAL L FINGER 2.5ML 10/24/2020 04:54 PM     Lab Results   Component Value Date/Time    CULTURE NO GROWTH 2 DAYS 10/24/2020 04:54 PM       Radiology:  Ct Head Wo Contrast    Result Date: 10/24/2020  Stable CT brain with no acute intracranial abnormality. Ct Abdomen Pelvis W Iv Contrast Additional Contrast? Oral    Result Date: 10/20/2020  1. Pancreatic head cyst is demonstrated, which may represent a pseudocyst or possibly side branch IPMN. No suspicious features were demonstrated within this lesion on recent MRI. This finding may resulting compressive affects on the adjacent bile duct, as described on recent MRI. As such, short-term imaging follow-up in 3-6 months is suggested. 2.  Mild peripancreatic edema suggestive of mild acute pancreatitis. 3.  Pleural effusions and dependent airspace disease, favoring atelectasis. 4.  Bilateral mild hydroureteronephrosis to the level of the bladder, which is distended. This may be due to a functional versus mechanical bladder outlet obstruction and resulting hydronephrosis. Punctate left nephrolithiasis is noted without stone in either ureter or the bladder. 5.  Findings consistent with hepatic steatosis. 6.  Mild edematous appearance of the distal sigmoid and rectum, which may be accentuated by underdistention. Possibility of colitis should be considered. 7.  Chronic appearing T11 compression deformity. Us Liver    Result Date: 10/19/2020  *Fatty infiltration of the liver. *Dilatation of the CBD measuring 11.3 mm.   Gallbladder appears unremarkable. In addition, there appears to be a cystic lesion involving the head of the pancreas measuring 2.4 x 2.2 x 1.5 cm. Further evaluation with MRI/MRCP with and without IV contrast is recommended. Differential considerations includes small pseudocyst, duodenal diverticulum or possibly pancreatic cystic neoplasm. Findings were not present on the 07/15/2019 study. Mri Abdomen W Wo Contrast Mrcp    Result Date: 10/19/2020  1. Cystic lesion at the pancreas may reflect periampullary duodenal diverticulum or pancreatic pseudocyst.  Correlative CT abdomen with IV and oral contrast recommended. 2. A number of the MR series are degraded by breathing motion, blurring detail. 3. Nonspecific dilated common bile duct without stricture or stone evident. This appears to drain at the possible duodenal diverticulum, or may be mildly dilated because of extrinsic compression by pancreatic pseudocyst. 4. Hepatic steatosis and mild hepatomegaly. 5. Small volume bilateral pleural effusion with bibasilar consolidation. Pneumonia is a consideration.  RECOMMENDATIONS: CT abdomen with IV and oral contrast       Physical Examination:        General appearance:  alert, cooperative and no distress  Mental Status:  oriented to person, place and time and normal affect  Lungs:  clear to auscultation bilaterally, normal effort  Heart:  regular rate and rhythm, no murmur  Abdomen:  soft, nontender, nondistended, normal bowel sounds, no masses, hepatomegaly, splenomegaly  Extremities:  no edema, redness, tenderness in the calves  Skin:  no gross lesions, rashes, induration    Assessment:        Hospital Problems           Last Modified POA    * (Principal) Acute respiratory failure with hypoxia ([address] 10/26/2020 Yes    Essential hypertension 10/13/2020 Yes    Depression 10/13/2020 Yes    Hypokalemia 10/20/2020 Yes    Macrocytic anemia 10/20/2020 Yes    Hypomagnesemia 10/20/2020 Yes    Ambulatory dysfunction 10/20/2020 Yes Seizures ([address] 10/24/2020 Yes    COPD exacerbation ([address] 10/26/2020 Yes    Alcohol withdrawal syndrome without complication ([address] 80/17/6975 Yes    Paresthesia of lower extremity 10/13/2020 Yes    Pancreatic cyst 10/19/2020 Yes    Recurrent major depressive disorder ([address] 10/20/2020 Yes    Elevated CA 19-9 level 10/20/2020 Yes    Acute alcoholic intoxication without complication ([address] 78/17/7572 Yes    Acute alcoholic gastritis without hemorrhage 10/22/2020 Yes    Perineal mass, female 10/23/2020 Yes    Esophagitis candidal  10/23/2020 Yes    Condyloma 10/23/2020 Yes    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24, the patient underwent 2 surgical resections without known recurrence 10/23/2020 Yes    Alcoholism ([address] 10/23/2020 Yes    Acute metabolic encephalopathy 19/87/7456 Yes    Ammonia level elevated ([facility] [address] 10/24/2020 Yes          Plan:          Monitor leukocytosis (prednisone versus infection)  Cultures ordered  Lactulose initiated  Diflucan initiated to cover Candida esophagitis  Colorectal surgery for perineal mass, condyloma?   Check VDRL, negative  Check HIV screen, negative  Encourage compliance to oxygen and BiPAP  Encourage compliance to PT  Respiratory Therapy and Bronchodilators prn  Pulmonary evaluation and f/u as scheduled   Observe for further DTs  Thiamine  Ativan  Psych evaluation  and follow-up as scheduled, MDD  Anemia w/u on outpatient basis is suggested    Correct electrolyte abnormalities  Physical therapy and rehabilitation    Seizure precautions  Aspiration precautions  GI evaluation and follow-up, awaiting Bx's  Wound care: Buttock wound  Colorectal surgery consulted, F/U for surgical removal as OP    Stable for DC    Med rec done  Scripts added   ISMA signed  30+ minutes spent        Mini Sethi MD  10/26/2020  2:15 PM

## 2020-10-26 NOTE — FLOWSHEET NOTE
Patient cleaned of small stool and urine incontinence. IV removed and cath intact. To [facility] per ambulance. Facility notified of patient leaving, mother notified of patient leaving. All belongings that mother did not take sent with patient, glasses and small blanket.

## 2020-10-26 NOTE — FLOWSHEET NOTE
Attempted to call report to [id number] in [facility], no answer at this time. Will attempt to call again in a few minutes.

## 2020-10-26 NOTE — PLAN OF CARE
Problem: Falls - Risk of:  Goal: Will remain free from falls  Description: Will remain free from falls  10/26/2020 0641 by Aj Blas RN  Outcome: Ongoing  10/26/2020 0628 by Aj Blas RN  Outcome: Ongoing  Goal: Absence of physical injury  Description: Absence of physical injury  10/26/2020 0641 by Aj Blas RN  Outcome: Ongoing  10/26/2020 0628 by Aj Blas RN  Outcome: Ongoing     Problem: Pain:  Goal: Pain level will decrease  Description: Pain level will decrease  10/26/2020 0641 by Aj Blas RN  Outcome: Ongoing  10/26/2020 0628 by Aj Blas RN  Outcome: Ongoing  Goal: Control of acute pain  Description: Control of acute pain  10/26/2020 0641 by Aj Blas RN  Outcome: Ongoing  10/26/2020 0628 by Aj Blas RN  Outcome: Ongoing  Goal: Control of chronic pain  Description: Control of chronic pain  10/26/2020 0641 by Aj Blas RN  Outcome: Ongoing  10/26/2020 0628 by Aj Blas RN  Outcome: Ongoing     Problem: Breathing Pattern - Ineffective:  Goal: Ability to achieve and maintain a regular respiratory rate will improve  Description: Ability to achieve and maintain a regular respiratory rate will improve  10/26/2020 0641 by Aj Blas RN  Outcome: Ongoing  10/26/2020 0628 by Aj Blas RN  Outcome: Ongoing     Problem: Gas Exchange - Impaired:  Goal: Levels of oxygenation will improve  Description: Levels of oxygenation will improve  10/26/2020 0641 by Aj Blas RN  Outcome: Ongoing  10/26/2020 0628 by Aj Blas RN  Outcome: Ongoing     Problem: Skin Integrity:  Goal: Will show no infection signs and symptoms  Description: Will show no infection signs and symptoms  10/26/2020 0641 by Aj Blas RN  Outcome: Ongoing  10/26/2020 0628 by Aj Blas RN  Outcome: Ongoing  Goal: Absence of new skin breakdown  Description: Absence of new skin breakdown  10/26/2020 0641 by Golden Carrasquillo RN  Outcome: Ongoing  10/26/2020 0628 by Golden Carrasquillo RN  Outcome: Ongoing     Problem: Nutrition  Goal: Optimal nutrition therapy  Description: Nutrition Problem #1: Inadequate oral intake  Intervention: Food and/or Nutrient Delivery: Continue NPO(Start diet as able; recommend ensure enlive supplements TID as able)  Nutritional Goals: Restart diet within 24-72 hrs     10/26/2020 0641 by Golden Carrasquillo RN  Outcome: Ongoing  10/26/2020 0628 by Golden Carrasquillo RN  Outcome: Ongoing

## 2020-10-26 NOTE — DISCHARGE SUMMARY
without known recurrence    Alcoholism ([facility] [address]    Acute metabolic encephalopathy    Ammonia level elevated (HCC)  Resolved Problems:    * No resolved hospital problems. *      Admission Condition:  poor     Discharged Condition: stable    Hospital Stay:     Hospital Course:    Per chart:  \"45year-old brought in by family after being noted to be hypoxic at her PCPs office. Patient does complain of productive cough, bilateral lower extremity painx 2-3 months but worse in the last month.  Chronic unchanged shortness of breath going on for more than a year.  Has been using albuterol inhaler as needed.  Continues to smoke about 1 pack/day.  Drinks alcohol every day: Rum. Known history of hypertension, depression, COPD, excision of Astrocytoma. - ED evaluation showed patient was afebrile, hypoxic to 84% on room air, intoxicated with alcohol level 244, anemic hemoglobin 9.4, COVID rapid NAAT:  Negative, chest x-ray was unremarkable no pneumonia, high sensitive troponin 9, EKG with nonspecific ST-T changes. Lower extremity venous Dopplers negative for DVT.    Initial findings and plan have included:  Acute COPD exacerbation- Bipap prn, oxygen, Pulmonary following, on Augmentin, Prednisone  Acute resp failure- Bipap  Etoh abuse- experiencing withdrawal, on Ciwa protocol, Librium  Depression- Psych consult   Pancreatic cyst- dw GI, needs MRI/ MRCP, CA 19-9 slightly elevated, AFP wnl  Tobacco abuse- refusing Nicotine patch, will try lozenges/ gum  Hypokalemia/ Hypomag- replace, schedule KCL, will start MagOx  Gi/ DVT proph\"     Subsequent database has included:  MRCP:  Impression:  1. Cystic lesion at the pancreas may reflect periampullary duodenal   diverticulum or pancreatic pseudocyst.  Correlative CT abdomen with IV and   oral contrast recommended. 2. A number of the MR series are degraded by breathing motion, blurring   detail. 3. Nonspecific dilated common bile duct without stricture or stone evident.    This appears to drain at the possible duodenal diverticulum, or may be mildly   dilated because of extrinsic compression by pancreatic pseudocyst.   4. Hepatic steatosis and mild hepatomegaly. 5. Small volume bilateral pleural effusion with bibasilar consolidation. Pneumonia is a consideration.      CT abdomen:  Impression:  1.  Pancreatic head cyst is demonstrated, which may represent a pseudocyst or   possibly side branch IPMN.  No suspicious features were demonstrated within   this lesion on recent MRI.  This finding may resulting compressive affects on   the adjacent bile duct, as described on recent MRI.  As such, short-term   imaging follow-up in 3-6 months is suggested. 2.  Mild peripancreatic edema suggestive of mild acute pancreatitis. 3.  Pleural effusions and dependent airspace disease, favoring atelectasis. 4.  Bilateral mild hydroureteronephrosis to the level of the bladder, which   is distended.  This may be due to a functional versus mechanical bladder   outlet obstruction and resulting hydronephrosis.  Punctate left   nephrolithiasis is noted without stone in either ureter or the bladder. 5.  Findings consistent with hepatic steatosis. 6.  Mild edematous appearance of the distal sigmoid and rectum, which may be   accentuated by underdistention.  Possibility of colitis should be considered. 7.  Chronic appearing T11 compression deformity.      EGD 10/22:  1) Large amount of residual gastric food, partially digested, residual medication identified in the stomach body. Limited visualization. 2) Mild non-specific gastritis s/p cold biopsy. 3) Thick white plaque coating of the esophagus in the distal portion, possible food debri vs contrast vs candida. Cold biopsy taken.    4) Limited exam     Colonoscopy:  1) Large lobulated velvety and nodular mucosal growth extending from the anal verge into the gluteal, perineum, and vagina that grossly appears to suggest condyloma.    2) 7mm flat polyp at 25 cm and 8mm size flat polyp identified at 20 cm (not removed due to poor prep)   3) No signs of active bleeding observed, brown/green stool through out colon      Reported that she has not been sexually active for years  She is not   The patient has been unaware of the growth involving the perineum  No history of sexually transmitted diseases  Colorectal surgery consulted     Neurology was consulted  Database has included:  EEG:  Clinical correlation   This EEG was normal. No focal or epileptiform abnormalities were   seen.      CT brain:  Impression:  Stable CT brain with no acute intracranial abnormality. During the admission patient was managed as follow    Leukocytosis likely 2/2 prednisone use   Cultures ordered  Lactulose initiated  Diflucan initiated to cover Candida esophagitis  Colorectal surgery for perineal mass, condyloma? Colorectal surgery consulted, F/U for surgical removal as OP  Check VDRL, negative  Check HIV screen, negative  Encourage compliance to oxygen and BiPAP  Encourage compliance to PT  Respiratory Therapy and Bronchodilators prn  Pulmonary evaluation and f/u as scheduled   Observe for further DTs  Thiamine  Ativan  Psych evaluation  and follow-up as scheduled, MDD  Anemia w/u on outpatient basis is suggested    Correct electrolyte abnormalities  Physical therapy and rehabilitation    Seizure precautions  Aspiration precautions  GI evaluation and follow-up, awaiting Bx's  Wound care: Buttock wound    Stable for DC     Discussed plan in detail with the patient mother at bedside prior to discharge     Med rec done  Scripts added   ISMA signed  30+ minutes spent     Significant therapeutic interventions:     Significant Diagnostic Studies:     Radiology:  Ct Head Wo Contrast    Result Date: 10/24/2020  Stable CT brain with no acute intracranial abnormality. Ct Abdomen Pelvis W Iv Contrast Additional Contrast? Oral    Result Date: 10/20/2020  1.   Pancreatic head cyst is demonstrated, which may represent a pseudocyst or possibly side branch IPMN. No suspicious features were demonstrated within this lesion on recent MRI. This finding may resulting compressive affects on the adjacent bile duct, as described on recent MRI. As such, short-term imaging follow-up in 3-6 months is suggested. 2.  Mild peripancreatic edema suggestive of mild acute pancreatitis. 3.  Pleural effusions and dependent airspace disease, favoring atelectasis. 4.  Bilateral mild hydroureteronephrosis to the level of the bladder, which is distended. This may be due to a functional versus mechanical bladder outlet obstruction and resulting hydronephrosis. Punctate left nephrolithiasis is noted without stone in either ureter or the bladder. 5.  Findings consistent with hepatic steatosis. 6.  Mild edematous appearance of the distal sigmoid and rectum, which may be accentuated by underdistention. Possibility of colitis should be considered. 7.  Chronic appearing T11 compression deformity. Mri Abdomen W Wo Contrast Mrcp    Result Date: 10/19/2020  1. Cystic lesion at the pancreas may reflect periampullary duodenal diverticulum or pancreatic pseudocyst.  Correlative CT abdomen with IV and oral contrast recommended. 2. A number of the MR series are degraded by breathing motion, blurring detail. 3. Nonspecific dilated common bile duct without stricture or stone evident. This appears to drain at the possible duodenal diverticulum, or may be mildly dilated because of extrinsic compression by pancreatic pseudocyst. 4. Hepatic steatosis and mild hepatomegaly. 5. Small volume bilateral pleural effusion with bibasilar consolidation. Pneumonia is a consideration.  RECOMMENDATIONS: CT abdomen with IV and oral contrast       Consultations:    Consults:     Final Specialist Recommendations/Findings:   IP CONSULT TO HOSPITALIST  IP CONSULT TO SOCIAL WORK  IP CONSULT TO PSYCHIATRY  IP CONSULT TO PULMONOLOGY  IP CONSULT TO Read the directions carefully, and ask your doctor or other care provider to review them with you. CONTINUE taking these medications    Acetaminophen Extra Strength 500 MG tablet  Generic drug:  acetaminophen     albuterol sulfate  (90 Base) MCG/ACT inhaler  Commonly known as:  Ventolin HFA  Inhale 2 puffs into the lungs 4 times daily as needed for Wheezing     amLODIPine 5 MG tablet  Commonly known as:  NORVASC  Take 1 tablet by mouth daily     busPIRone 15 MG tablet  Commonly known as:  BUSPAR  Take 15 mg by mouth every 6 hours     carBAMazepine 200 MG extended release tablet  Commonly known as:  TEGRETOL XR  Take 1 tablet by mouth 3 times daily     escitalopram 20 MG tablet  Commonly known as:  LEXAPRO     ferrous sulfate 325 (65 Fe) MG tablet  Commonly known as:  IRON 325  Take 1 tablet by mouth 2 times daily     folic acid 1 MG tablet  Commonly known as:  FOLVITE  Take 1 tablet by mouth daily     gabapentin 100 MG capsule  Commonly known as:  NEURONTIN  Take 2 capsules by mouth 3 times daily for 180 days.  Intended supply: 90 days     hydrOXYzine 50 MG tablet  Commonly known as:  ATARAX  Take 1 tablet by mouth 3 times daily     magnesium carbonate 54 (Mag Equiv) MG/5ML Liqd oral liquid  Commonly known as:  MAGONATE  Take 5 mLs by mouth 3 times daily     potassium chloride 20 MEQ extended release tablet  Commonly known as:  KLOR-CON M  Take 1 tablet by mouth daily     propranolol 40 MG tablet  Commonly known as:  INDERAL     rOPINIRole 1 MG tablet  Commonly known as:  REQUIP  Take 1 tablet by mouth nightly     Spiriva Respimat 1.25 MCG/ACT Aers inhaler  Generic drug:  tiotropium  Inhale 2 puffs into the lungs daily     tiZANidine 4 MG tablet  Commonly known as:  ZANAFLEX  Take 1 tablet by mouth every 8 hours as needed (back pain)     traZODone 100 MG tablet  Commonly known as:  DESYREL     vitamin B-1 100 MG tablet  Commonly known as:  THIAMINE  Take 1 tablet by mouth daily     vitamin D 1.25 MG (96286 UT) Caps capsule  Commonly known as:  ERGOCALCIFEROL  Take 1 capsule by mouth once a week        STOP taking these medications    acamprosate 333 MG tablet  Commonly known as:  CAMPRAL     ibuprofen 800 MG tablet  Commonly known as:  ADVIL;MOTRIN     neomycin-polymyxin-dexameth 3.5-14690-0.1 Oint           Where to Get Your Medications      These medications were sent to [address] [place] [address] [phone]  [address] [place] [address],  Jessica Ye     Phone:  [phone]   amoxicillin-clavulanate 500-125 MG per tablet  lactulose 10 GM/15ML solution  predniSONE 10 MG tablet  predniSONE 20 MG tablet     You can get these medications from any pharmacy    Bring a paper prescription for each of these medications  chlordiazePOXIDE 5 MG capsule         Discharge Procedure Orders   CBC With Auto Differential   Standing Status: Future Standing Exp. Date: 10/26/21     Hepatic Function Panel   Standing Status: Future Standing Exp. Date: 10/26/21       Time Spent on discharge is  40 mins in patient examination, evaluation, counseling as well as medication reconciliation, prescriptions for required medications, discharge plan and follow up. Electronically signed by   Martha Otero MD  10/26/2020  4:17 PM      Thank you Dr. Karina Trejo MD for the opportunity to be involved in this patient's care.

## 2020-10-26 NOTE — PLAN OF CARE
Problem: Falls - Risk of:  Goal: Will remain free from falls  Description: Will remain free from falls  10/26/2020 1815 by Shyam Chan RN  Outcome: Completed  10/26/2020 0641 by Vincenzo Guevara RN  Outcome: Ongoing  10/26/2020 0628 by Vincenzo Guevara RN  Outcome: Ongoing  Goal: Absence of physical injury  Description: Absence of physical injury  10/26/2020 1815 by Shyam Chan RN  Outcome: Completed  10/26/2020 0641 by Vincenzo Guevara RN  Outcome: Ongoing  10/26/2020 0628 by Vincenzo Guevara RN  Outcome: Ongoing     Problem: Pain:  Goal: Pain level will decrease  Description: Pain level will decrease  10/26/2020 1815 by Shyam Chan RN  Outcome: Completed  10/26/2020 0641 by Vincenzo Guevara RN  Outcome: Ongoing  10/26/2020 0628 by Vincenzo Guevara RN  Outcome: Ongoing  Goal: Control of acute pain  Description: Control of acute pain  10/26/2020 1815 by Shyam Chan RN  Outcome: Completed  10/26/2020 0641 by Vincenzo Guevara RN  Outcome: Ongoing  10/26/2020 0628 by Vincenzo Guevara RN  Outcome: Ongoing  Goal: Control of chronic pain  Description: Control of chronic pain  10/26/2020 1815 by Shyam Chan RN  Outcome: Completed  10/26/2020 0641 by Vincenzo Guevara RN  Outcome: Ongoing  10/26/2020 0628 by Vincenzo Guevara RN  Outcome: Ongoing     Problem: Breathing Pattern - Ineffective:  Goal: Ability to achieve and maintain a regular respiratory rate will improve  Description: Ability to achieve and maintain a regular respiratory rate will improve  10/26/2020 1815 by Shyam Chan RN  Outcome: Completed  10/26/2020 0641 by Vincenzo Guevara RN  Outcome: Ongoing  10/26/2020 0628 by Vincenzo Guevara RN  Outcome: Ongoing     Problem: Gas Exchange - Impaired:  Goal: Levels of oxygenation will improve  Description: Levels of oxygenation will improve  10/26/2020 1815 by Shyam Chan RN  Outcome: Completed  10/26/2020 0641 by Vincenzo Guevara RN  Outcome: Ongoing  10/26/2020 0628 by Rios Montero RN  Outcome: Ongoing     Problem: Skin Integrity:  Goal: Will show no infection signs and symptoms  Description: Will show no infection signs and symptoms  10/26/2020 1815 by Wendy Smyth RN  Outcome: Completed  10/26/2020 0641 by Rios Montero RN  Outcome: Ongoing  10/26/2020 0628 by Rios Montero RN  Outcome: Ongoing  Goal: Absence of new skin breakdown  Description: Absence of new skin breakdown  10/26/2020 1815 by Wendy Smyth RN  Outcome: Completed  10/26/2020 0641 by Rios Montero RN  Outcome: Ongoing  10/26/2020 0628 by Rios Montero RN  Outcome: Ongoing     Problem: Nutrition  Goal: Optimal nutrition therapy  Description: Nutrition Problem #1: Inadequate oral intake  Intervention: Food and/or Nutrient Delivery: Continue NPO(Start diet as able; recommend ensure enlive supplements TID as able)  Nutritional Goals: Restart diet within 24-72 hrs     10/26/2020 1815 by Wendy Smyth RN  Outcome: Completed  10/26/2020 0641 by Rios Montero RN  Outcome: Ongoing  10/26/2020 0628 by Rios Montero RN  Outcome: Ongoing

## 2020-10-27 NOTE — PROGRESS NOTES
Patients mother contacted about partials found after patient d/c to facility,  Mother will be up to pick them up tomorrow.   Mother notified on 10/26/2020 @217pm

## 2020-10-30 LAB
CULTURE: NORMAL
CULTURE: NORMAL
Lab: NORMAL
Lab: NORMAL
SPECIMEN DESCRIPTION: NORMAL
SPECIMEN DESCRIPTION: NORMAL

## 2020-11-09 ENCOUNTER — TELEPHONE (OUTPATIENT)
Dept: FAMILY MEDICINE CLINIC | Age: 50
End: 2020-11-09

## 2020-11-09 NOTE — TELEPHONE ENCOUNTER
Pt mom called and states pt is now in [address] and she wants to know how she can get a psych evaluation.

## 2020-11-09 NOTE — TELEPHONE ENCOUNTER
Informed mom. States she had a esophageal bi posy and would like to know if you have the results of this. States it was done in the hospital and they were not told results.

## 2020-11-11 ENCOUNTER — OFFICE VISIT (OUTPATIENT)
Dept: GASTROENTEROLOGY | Age: 50
End: 2020-11-11
Payer: MEDICARE

## 2020-11-11 ENCOUNTER — TELEPHONE (OUTPATIENT)
Dept: GASTROENTEROLOGY | Age: 50
End: 2020-11-11

## 2020-11-11 VITALS — DIASTOLIC BLOOD PRESSURE: 95 MMHG | BODY MASS INDEX: 19.64 KG/M2 | SYSTOLIC BLOOD PRESSURE: 147 MMHG | WEIGHT: 118 LBS

## 2020-11-11 PROCEDURE — G8427 DOCREV CUR MEDS BY ELIG CLIN: HCPCS | Performed by: INTERNAL MEDICINE

## 2020-11-11 PROCEDURE — 4004F PT TOBACCO SCREEN RCVD TLK: CPT | Performed by: INTERNAL MEDICINE

## 2020-11-11 PROCEDURE — G8484 FLU IMMUNIZE NO ADMIN: HCPCS | Performed by: INTERNAL MEDICINE

## 2020-11-11 PROCEDURE — G8420 CALC BMI NORM PARAMETERS: HCPCS | Performed by: INTERNAL MEDICINE

## 2020-11-11 PROCEDURE — 1111F DSCHRG MED/CURRENT MED MERGE: CPT | Performed by: INTERNAL MEDICINE

## 2020-11-11 PROCEDURE — 99213 OFFICE O/P EST LOW 20 MIN: CPT | Performed by: INTERNAL MEDICINE

## 2020-11-11 PROCEDURE — 3017F COLORECTAL CA SCREEN DOC REV: CPT | Performed by: INTERNAL MEDICINE

## 2020-11-11 ASSESSMENT — ENCOUNTER SYMPTOMS
TROUBLE SWALLOWING: 1
DIARRHEA: 1
ALLERGIC/IMMUNOLOGIC NEGATIVE: 1
RESPIRATORY NEGATIVE: 1
EYES NEGATIVE: 1

## 2020-11-11 NOTE — TELEPHONE ENCOUNTER
Dr Peggy Hester ordered EUS for Beatrice Hernandez at office visit 11/11/20 to be scheduled with Dr Thang Horn. If she doesn't answer the call to schedule it is ok to call her Rose Marienineisha Walsh at [phone].

## 2020-11-11 NOTE — PROGRESS NOTES
GI FOLLOW UP    INTERVAL HISTORY:     Alcoholism  Alcohol withdrawal  COPD  Condyloma  Chronic anemia  Pancreatic lesion    Chief Complaint   Patient presents with    Follow-up     EGD/colon follow up.  Diarrhea     Patient states having diarrhea regularily for the past 2 years. States having up to 4 bowel movements daily, sudden and explosive. Denies blood/black stools. Denies abdominal pain. HISTORY OF PRESENT ILLNESS: Pilar Naik is a 45 y.o. 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 During the colonoscopy patient was identified to have a 7 mm flat polyp at approximately 25 cm and 8 mm size flat polyp identified at 20 cm not removed due to poor prep. She will likely require repeat colonoscopy once evaluated by colorectal surgery. Patient had CT abdomen pelvis which demonstrated pancreatic head cyst (pseudocyst versus sidebranch IPMN measuring approximately 2.8 cm. Mild elevation in CA 19-9 was observed during the hospital course. Past Medical,Family, and Social History reviewed and does contribute to the patient presenting condition. Patient's PMH/PSH,SH,PSYCH Hx, MEDs, ALLERGIES, and ROS were all reviewed and updated in the appropriate sections.     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24, the patient underwent 2 surgical resections without known recurrence 10/23/2020    Closed fracture of lateral portion of left tibial plateau 7/7/4610    COPD (chronic obstructive pulmonary disease) ([facility])     Depression     bipolar, major depressive disorder, ptsd, anxiety    Hypertension     Pain, joint, ankle and foot        Past Surgical History:   Procedure Laterality Date    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    COLONOSCOPY N/A 10/22/2020    COLONOSCOPY DIAGNOSTIC performed by Berkley Buckley MD at [address] [facility] Left 7-3-13    ORIF tibial plateau    FRACTURE SURGERY Right     small finger metacarpal fracture    HYSTERECTOMY  2003    UPPER GASTROINTESTINAL ENDOSCOPY N/A 10/22/2020    EGD BIOPSY performed by Berkley Buckley MD at [facility]       CURRENT MEDICATIONS:    Current Outpatient Medications:     lactulose ([employer]) 10 GM/15ML solution, Take 30 mLs by mouth 3 times daily, Disp: 1 Bottle, Rfl: 1    chlordiazePOXIDE (LIBRIUM) 5 MG capsule, Take 1 capsule by mouth 3 times daily as needed for Anxiety for up to 30 days. , Disp: 3 capsule, Rfl: 0    tiotropium (SPIRIVA RESPIMAT) 1.25 MCG/ACT AERS inhaler, Inhale 2 puffs into the lungs daily, Disp: 1 Inhaler, Rfl: 3    tiZANidine (ZANAFLEX) 4 MG tablet, Take 1 tablet by mouth every 8 hours as needed (back pain), Disp: 30 tablet, Rfl: 3    potassium chloride (KLOR-CON M) 20 MEQ extended release tablet, Take 1 tablet by mouth daily, Disp: 30 tablet, Rfl: 5    magnesium carbonate (MAGONATE) 54 (Mag Equiv) MG/5ML LIQD oral liquid, Take 5 mLs by mouth 3 times daily, Disp: 600 mL, Rfl: 3    ferrous sulfate (IRON 325) 325 (65 Fe) MG tablet, Take 1 tablet by mouth 2 times daily, Disp: 180 tablet, Rfl: 1    rOPINIRole (REQUIP) 1 MG tablet, Take 1 tablet by mouth nightly, Disp: 90 tablet, Rfl: 1    ACETAMINOPHEN EXTRA STRENGTH 500 MG tablet, Take 500 mg by mouth 3 times daily as needed, Disp: , Rfl:     propranolol (INDERAL) 40 MG tablet, Take 40 mg by mouth 2 times daily, Disp: ,  Smoking status: Current Every Day Smoker     Packs/day: 0.50     Years: 30.00     Pack years: 15.00     Types: Cigarettes    Smokeless tobacco: Never Used   Substance and Sexual Activity    Alcohol use: Yes     Alcohol/week: 0.0 standard drinks     Comment: every other day, pint of rum     Drug use: Not Currently     Types: Marijuana     Comment: occasional    Sexual activity: Not on file   Lifestyle    Physical activity     Days per week: Not on file     Minutes per session: Not on file    Stress: Not on file   Relationships    Social connections     Talks on phone: Not on file     Gets together: Not on file     Attends [employer] service: Not on file     Active member of club or organization: Not on file     Attends meetings of clubs or organizations: Not on file     Relationship status: Not on file    Intimate partner violence     Fear of current or ex partner: Not on file     Emotionally abused: Not on file     Physically abused: Not on file     Forced sexual activity: Not on file   Other Topics Concern    Not on file   Social History Narrative    Not on file       REVIEW OF SYSTEMS: A 12-point review of systems was obtained and pertinent positives and negatives were listed below. REVIEW OF SYSTEMS:     Constitutional: No fever, no chills, no lethargy, no weakness. HEENT:  No headache, otalgia, itchy eyes, nasal discharge or sore throat. Cardiac:  No chest pain, dyspnea, orthopnea or PND. Chest:   No cough, phlegm or wheezing. Abdomen:      Detailed by MA   Neuro:  No focal weakness, abnormal movements or seizure like activity. Skin:   No rashes, no itching. :   No hematuria, no pyuria, no dysuria, no flank pain. Extremities:  No swelling or joint pains. ROS was otherwise negative    Review of Systems   Constitutional: Negative. HENT: Positive for trouble swallowing (saliva). Eyes: Negative. Respiratory: Negative. Cardiovascular: Negative.     Gastrointestinal: Positive for diarrhea. Endocrine: Negative. Genitourinary: Negative. Musculoskeletal: Positive for gait problem. Skin: Negative. Allergic/Immunologic: Negative. Hematological: Negative. Psychiatric/Behavioral: Negative. All other systems reviewed and are negative. PHYSICAL EXAMINATION: Vital signs reviewed per the nursing documentation. BP (!) 141/94   Wt 118 lb (53.5 kg)   BMI 19.64 kg/m²   Body mass index is 19.64 kg/m². Physical Exam    Physical Exam   Constitutional: Patient is oriented to person, place, and time. Patient appears well-developed and well-nourished. HENT:   Head: Normocephalic and atraumatic. Eyes: Pupils are equal, round, and reactive to light. EOM are normal.   Neck: Normal range of motion. Neck supple. No JVD present. No tracheal deviation present. No thyromegaly present. Cardiovascular: Normal rate, regular rhythm, normal heart sounds and intact distal pulses. Pulmonary/Chest: Effort normal and breath sounds normal. No stridor. No respiratory distress. He has no wheezes. He has no rales. He exhibits no tenderness. Abdominal: Soft. Bowel sounds are normal. He exhibits no distension and no mass. There is no tenderness. There is no rebound and no guarding. No hernia. Musculoskeletal: Normal range of motion. Lymphadenopathy:    Patient has no cervical adenopathy. Neurological: Patient is alert and oriented to person, place, and time. Psychiatric: Patient has a normal mood and affect.  Patient behavior is normal.       LABORATORY DATA: Reviewed  Lab Results   Component Value Date    WBC 19.4 (H) 10/26/2020    HGB 7.6 (L) 10/26/2020    HCT 26.4 (L) 10/26/2020    .9 (H) 10/26/2020     (H) 10/26/2020     10/26/2020    K 3.7 10/26/2020     (H) 10/26/2020    CO2 19 (L) 10/26/2020    BUN 9 10/26/2020    CREATININE 0.58 10/26/2020    LABALBU 1.8 (L) 10/26/2020    BILITOT 0.31 10/26/2020    ALKPHOS 207 (H) 10/26/2020    AST 29 10/26/2020 ALT 14 10/26/2020    INR 1.1 10/26/2020         Lab Results   Component Value Date    RBC 2.38 (L) 10/26/2020    HGB 7.6 (L) 10/26/2020    .9 (H) 10/26/2020    MCH 31.9 10/26/2020    MCHC 28.8 10/26/2020    RDW 16.9 (H) 10/26/2020    MPV 11.3 10/26/2020    BASOPCT 0 10/26/2020    LYMPHSABS 1.94 10/26/2020    MONOSABS 0.78 10/26/2020    NEUTROABS 16.49 (H) 10/26/2020    EOSABS 0.00 10/26/2020    BASOSABS 0.00 10/26/2020         DIAGNOSTIC TESTING:     Ct Head Wo Contrast    Result Date: 10/24/2020  EXAMINATION: CT OF THE HEAD WITHOUT CONTRAST  10/24/2020 9:02 am TECHNIQUE: CT of the head was performed without the administration of intravenous contrast. Dose modulation, iterative reconstruction, and/or weight based adjustment of the mA/kV was utilized to reduce the radiation dose to as low as reasonably achievable. COMPARISON: Multiple prior examinations including CT brain 12/13/2019 and 10/27/2019, MRI brain 10/28/2019 HISTORY: Reason for Exam: change in mental status FINDINGS: BRAIN/VENTRICLES: No acute intracranial hemorrhage, mass effect or midline shift. No abnormal extra-axial fluid collection. The gray-white differentiation is maintained without evidence of an acute infarct. No evidence of hydrocephalus. Stable mild chronic small vessel ischemic change. ORBITS: The visualized portion of the orbits demonstrate no acute abnormality. SINUSES: The visualized paranasal sinuses and mastoid air cells appear clear. SOFT TISSUES/SKULL:  No acute abnormality of the visualized skull or soft tissues. Stable postsurgical changes of suboccipital craniotomy/decompression. Stable CT brain with no acute intracranial abnormality.      Ct Abdomen Pelvis W Iv Contrast Additional Contrast? Oral    Result Date: 10/20/2020  EXAMINATION: CT OF THE ABDOMEN AND PELVIS WITH CONTRAST 10/20/2020 12:33 pm TECHNIQUE: CT of the abdomen and pelvis was performed with the administration of intravenous contrast. Multiplanar reformatted images are provided for review. Dose modulation, iterative reconstruction, and/or weight based adjustment of the mA/kV was utilized to reduce the radiation dose to as low as reasonably achievable. COMPARISON: MRI 10/19/2020 HISTORY: ORDERING SYSTEM PROVIDED HISTORY: MRI/MRCP shoing possible pancreatic cyst vs duodenal diverticulum vs possible pseudocyst. Recommendation for CT per radiologist TECHNOLOGIST PROVIDED HISTORY: Pancreatic protocol MRI/MRCP shoing possible pancreatic cyst vs duodenal diverticulum vs possible pseudocyst. Recommendation for CT per radiologist Reason for Exam: mri shows possible pnacreatic cyst vs duodenal diverticulum, possible  pseudocyst. Acuity: Unknown Type of Exam: Unknown FINDINGS: Lower Chest: Pleural effusions and dependent airspace disease, favoring atelectasis. Organs: Hepatic steatosis. The gallbladder is contracted. No biliary dilatation. There is mild edema in the periportal space and edema in the head of the pancreas. The cystic structure described on MRI is demonstrated measuring 2.8 x 2.0 cm and appears contained within the pancreas. No evidence for duodenal diverticulum. No clear evidence for communication with the pancreatic duct. Mild bilateral hydroureteronephrosis to the level of the bladder, which is distended. Punctate left renal calculi are noted. No stones identified in either ureter or the bladder. The spleen and adrenals appear unremarkable. GI/Bowel: No evidence for bowel obstruction. Mild edematous appearance of the distal sigmoid and rectum. Normal appendix. Pelvis: Distended bladder without wall thickening or adjacent inflammatory change. Peritoneum/Retroperitoneum: No free air. No ascites. No enlarged or suspicious-appearing lymph nodes. The aorta is normal in caliber. Calcified atheromatous plaque is present. Bones/Soft Tissues: Mild body wall edema. Compression deformity of T11 is noted.   Given the absence of marrow edema on recent ascites. *Fatty infiltration of the liver. *Dilatation of the CBD measuring 11.3 mm. Gallbladder appears unremarkable. In addition, there appears to be a cystic lesion involving the head of the pancreas measuring 2.4 x 2.2 x 1.5 cm. Further evaluation with MRI/MRCP with and without IV contrast is recommended. Differential considerations includes small pseudocyst, duodenal diverticulum or possibly pancreatic cystic neoplasm. Findings were not present on the 07/15/2019 study. Xr Chest Portable    Result Date: 10/17/2020  EXAMINATION: ONE XRAY VIEW OF THE CHEST 10/17/2020 9:42 am COMPARISON: 16 October 2020 HISTORY: ORDERING SYSTEM PROVIDED HISTORY: f/u TECHNOLOGIST PROVIDED HISTORY: f/u FINDINGS: AP portable view of the chest time stamped at 913 hours demonstrates overlying cardiac monitoring electrodes. The patient has bilateral effusions, diminished from prior study. Mild interval improvement in scattered pulmonary opacities is noted. No extrapleural air. Heart size is normal.     Mild interval improvement in scattered pulmonary opacities. Decrease in size of pleural effusions, now minimal.     Xr Chest Portable    Result Date: 10/16/2020  EXAMINATION: ONE XRAY VIEW OF THE CHEST 10/16/2020 11:52 am COMPARISON: October 14, 2020 HISTORY: ORDERING SYSTEM PROVIDED HISTORY: SOB TECHNOLOGIST PROVIDED HISTORY: SOB Reason for Exam: sob port upr at 1110am FINDINGS: There is no evidence of pneumothorax. Is small bilateral pleural effusions right greater than left. There is worsening right lower lobe infiltrate. There is also developing opacity in the left lower lobe which may represent new or infiltrate. Trachea is midline. Heart mediastinum are stable. Visualized bony thorax shows no acute abnormality. Small bilateral pleural effusions. Worsening right lower lobe infiltrate and possibly new left lower lobe infiltrate.      Xr Chest Portable    Result Date: 10/14/2020  EXAMINATION: ONE XRAY VIEW OF contrast.  MIP images are provided for review. COMPARISON: ultrasound liver performed earlier today HISTORY: ORDERING SYSTEM PROVIDED HISTORY: Abnormal LFTs, pancreatic lesion on Liver US, Dilated CBD TECHNOLOGIST PROVIDED HISTORY: Abnormal LFTs, pancreatic lesion of Liver US, Dilated CBD Is the patient pregnant?-> no Reason for Exam: Abnormal LFTs, pancreatic lesion on Liver US, Dilated CBD FINDINGS: TECHNICAL: A number of the MR series are degraded by breathing motion blurring detail. PANCREAS: Cystic area adjacent to or involving the pancreatic head corresponds to that seen sonographically, 22 x 26 mm axial series 8, image 28. No discrete enhancement is evident following contrast administration. No restricted diffusion. Other portions of the pancreas have an unremarkable appearance. GALLBLADDER: Unremarkable in appearance. OTHER ORGANS: The spleen and adrenal glands appear unremarkable. The liver is fatty. No discrete hepatic lesion is evident. Craniocaudal liver measurement 18 cm. The kidneys appear unremarkable. MRCP: The common bile duct measures 10 mm, no stone or stricture. The pancreatic duct measures 2-3 mm, unremarkable appearance. PERITONEUM/RETROPERITONEUM: Unremarkable appearance of the aorta aorta. No aneurysm. Unremarkable appearance of the IVC. No adenopathy or fluid. BONES/SOFT TISSUES: Unremarkable appearance. LOWER CHEST: Small volume bilateral pleural effusion with bibasilar consolidation. 1. Cystic lesion at the pancreas may reflect periampullary duodenal diverticulum or pancreatic pseudocyst.  Correlative CT abdomen with IV and oral contrast recommended. 2. A number of the MR series are degraded by breathing motion, blurring detail. 3. Nonspecific dilated common bile duct without stricture or stone evident.  This appears to drain at the possible duodenal diverticulum, or may be mildly dilated because of extrinsic compression by pancreatic pseudocyst. 4. Hepatic steatosis and mild hepatomegaly. 5. Small volume bilateral pleural effusion with bibasilar consolidation. Pneumonia is a consideration. RECOMMENDATIONS: CT abdomen with IV and oral contrast     Vl Dup Lower Extremity Venous Bilateral    Result Date: 10/13/2020    [facility]  Vascular Lower Extremities DVT Study Procedure   Patient Name   Neyda Wood     Date of Study           10/13/2020                 PAM WYNN   Date of Birth  1970  Gender                  Female   Age            45 year(s)  Race                       Room Number    2003   Corporate ID # [id number]   Patient Acct # [de-identified]   MR #           [id number]     Hehsam Field RVT   Accession #    [id number]  Interpreting Physician  Mychal Booth   Referring                  Referring Physician     Marcio Saenz  Nurse  Practitioner  Procedure Type of Study:   Veins: Lower Extremities DVT Study, Venous Scan Lower Bilateral.  Indications for Study:Pain, leg and Hypoxia. Patient Status:ER. Technical Quality:Limited visualization. Limitation reason:Limited visualization of the calf veins due to small size in diameter. Conclusions   Summary   Simultaneous real time imaging utilizing B-Mode, color doppler and  spectral waveform analysis was performed on the bilateral lower  extremities for venous examination of the deep and superficial systems. Findings are:   Right:  No evidence of deep or superficial venous thrombosis. Left:  No evidence of deep or superficial venous thrombosis.    Signature   ----------------------------------------------------------------  Electronically signed by Zachary Reich RVT(Sonographer) on  10/13/2020 12:47 PM  ----------------------------------------------------------------   ----------------------------------------------------------------  Electronically signed by Jessi Mays(Interpreting  physician) on 10/13/2020 10:13 PM ----------------------------------------------------------------  Findings:   Right Impression:                    Left Impression:  The common femoral, femoral,         The common femoral, femoral,  popliteal and tibial veins           popliteal and tibial veins  demonstrate normal compressibility   demonstrate normal compressibility  and augmentation. and augmentation. Normal compressibility of the great  Normal compressibility of the great  saphenous vein. saphenous vein. Normal compressibility of the small  Normal compressibility of the small  saphenous vein. saphenous vein. Velocities are measured in cm/s ; Diameters are measured in cm Right Lower Extremities DVT Study Measurements Right 2D Measurements +------------------------------------+----------+---------------+----------+ ! Location                            ! Visualized! Compressibility! Thrombosis! +------------------------------------+----------+---------------+----------+ ! Common Femoral                      !Yes       ! Yes            ! None      ! +------------------------------------+----------+---------------+----------+ ! Prox Femoral                        !Yes       ! Yes            ! None      ! +------------------------------------+----------+---------------+----------+ ! Mid Femoral                         !Yes       ! Yes            ! None      ! +------------------------------------+----------+---------------+----------+ ! Dist Femoral                        !Yes       ! Yes            ! None      ! +------------------------------------+----------+---------------+----------+ ! Deep Femoral                        !Yes       ! Yes            ! None      ! +------------------------------------+----------+---------------+----------+ ! Popliteal                           !Yes       ! Yes            ! None      ! +------------------------------------+----------+---------------+----------+ ! Sapheno Lower Extremities DVT Study Measurements Left 2D Measurements +------------------------------------+----------+---------------+----------+ ! Location                            ! Visualized! Compressibility! Thrombosis! +------------------------------------+----------+---------------+----------+ ! Common Femoral                      !Yes       ! Yes            ! None      ! +------------------------------------+----------+---------------+----------+ ! Prox Femoral                        !Yes       ! Yes            ! None      ! +------------------------------------+----------+---------------+----------+ ! Mid Femoral                         !Yes       ! Yes            ! None      ! +------------------------------------+----------+---------------+----------+ ! Dist Femoral                        !Yes       ! Yes            ! None      ! +------------------------------------+----------+---------------+----------+ ! Deep Femoral                        !Yes       ! Yes            ! None      ! +------------------------------------+----------+---------------+----------+ ! Popliteal                           !Yes       ! Yes            ! None      ! +------------------------------------+----------+---------------+----------+ ! Sapheno Femoral Junction            ! Yes       ! Yes            ! None      ! +------------------------------------+----------+---------------+----------+ ! PTV                                 ! Partial   !Yes            ! None      ! +------------------------------------+----------+---------------+----------+ ! Peroneal                            !Partial   !Yes            ! None      ! +------------------------------------+----------+---------------+----------+ ! Gastroc                             ! Yes       ! Yes            ! None      ! +------------------------------------+----------+---------------+----------+ ! GSGUERRERO Nguyễn                           ! Yes       ! Yes            ! None      ! +------------------------------------+----------+---------------+----------+ ! GSV Knee                            ! Yes       ! Yes            ! None      ! +------------------------------------+----------+---------------+----------+ ! GSV Ankle                           ! Yes       ! Yes            ! None      ! +------------------------------------+----------+---------------+----------+ ! SSV                                 ! Partial   !Yes            ! None      ! +------------------------------------+----------+---------------+----------+ Left Doppler Measurements +---------------------------+------+------+--------------------------------+ ! Location                   ! Signal!Reflux! Reflux (msec)                   ! +---------------------------+------+------+--------------------------------+ ! Common Femoral             !Phasic!      !                                ! +---------------------------+------+------+--------------------------------+ ! Prox Femoral               !Phasic!      !                                ! +---------------------------+------+------+--------------------------------+ ! Popliteal                  !Phasic!      !                                ! +---------------------------+------+------+--------------------------------+         IMPRESSION:  Fish Viramontes is a 45 y.o. 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     During the colonoscopy patient was identified to have a 7 mm flat polyp at approximately 25 cm and 8 mm size flat polyp identified at 20 cm not removed due to poor prep. She will likely require repeat colonoscopy once evaluated by colorectal surgery. Patient had CT abdomen pelvis which demonstrated pancreatic head cyst (pseudocyst versus sidebranch IPMN measuring approximately 2.8 cm. Mild elevation in CA 19-9 was observed during the hospital course. No evidence of cirrhosis was seen on imaging however the patient did have hepatic steatosis    PLAN:    1) pancreatic head lesion measuring approximately 2.8 cm-mildly elevated Ca-199, will need to repeat this value. Patient will need to be scheduled for an EUS to further characterize this pancreatic lesion possible need for FNB. 2) Condyloma lesions the anal rectal and genital region, to be removed by Dr. Rosa Maria López surgery    3) Colonoscopy to be planned in 3 months with extended bowel prep to remove flat polyps identified on index procedure. Strong recommendation with regards to alcohol abstinence recommended. Thank you for allowing me to participate in the care of Ms. Ashley Alexis. For any further questions please do not hesitate to contact me. I have reviewed and agree with the ROS entered by the MA/LPN from today's encounter documented in a separate note. Maria De Jesus Magana MD, MPH   [facility]  Office #: [phone]    this note is created with the assistance of a speech recognition program.  While intending to generate a document that actually reflects the content of the visit, the document can still have some errors including those of syntax and sound a like substitutions which may escape proof reading. It such instances, actual meaning can be extrapolated by contextual diversion.

## 2020-11-12 NOTE — TELEPHONE ENCOUNTER
[facility], pt's mom, called back stating pt will still be in the rehab facility on 11/18/20 and would like to resched pt's proc to 12/9/20. Pt is resched w/ Gregg at [place] [id number] 12/9/20 @ 12pm proc time, 10am arrival time. Prep instructions given to [facility] over the phone and hard copy will be e-mailed to [email or web address]. [facility] reminded that pt will need a . Covid testing is sched at [facility] 12/5/20 @ Laney Zuleta given address, telephone number and instructed where to report. [facility] voices her understanding.

## 2020-11-12 NOTE — TELEPHONE ENCOUNTER
[facility] for Barbara to schedule EUS. Talked to Jennifer Sampson Mom, to discuss scheduling EUS. She states she is Barbara's  and will do the scheduling. She is now scheduled [facility] Wednesday 11/18/20 @ 9:15 am EUS Dr Marie Pyle (Dr Eri Rajan pt). She states Nancie Menendez is at a rehab facility until 11/17/20 and will check to see if Covid testing can be done there. Informed her if they can't do the Covid testing within the 4 days of the procedure we will need to schedule it with [facility]. EUS instructions emailed to Ashtyn Jean-Baptiste at [email or web address]. Ashtyn Jean-Baptiste will call back later to discuss the Covid testing.

## 2020-11-17 ENCOUNTER — TELEPHONE (OUTPATIENT)
Dept: FAMILY MEDICINE CLINIC | Age: 50
End: 2020-11-17

## 2020-11-17 NOTE — TELEPHONE ENCOUNTER
Pt is being discharged and referred to home health. Lucio called to confirm we would follow. Called lucio back to inform her we would, and was unable to speak with anyone or leave message. Was on hold for over 15 minutes.  Will try again

## 2020-11-20 ENCOUNTER — OFFICE VISIT (OUTPATIENT)
Dept: PULMONOLOGY | Age: 50
End: 2020-11-20
Payer: MEDICARE

## 2020-11-20 VITALS
DIASTOLIC BLOOD PRESSURE: 73 MMHG | TEMPERATURE: 98.1 F | BODY MASS INDEX: 18.83 KG/M2 | RESPIRATION RATE: 20 BRPM | SYSTOLIC BLOOD PRESSURE: 102 MMHG | HEIGHT: 65 IN | WEIGHT: 113 LBS | OXYGEN SATURATION: 99 % | HEART RATE: 127 BPM

## 2020-11-20 PROCEDURE — 1111F DSCHRG MED/CURRENT MED MERGE: CPT | Performed by: INTERNAL MEDICINE

## 2020-11-20 PROCEDURE — G8926 SPIRO NO PERF OR DOC: HCPCS | Performed by: INTERNAL MEDICINE

## 2020-11-20 PROCEDURE — 4004F PT TOBACCO SCREEN RCVD TLK: CPT | Performed by: INTERNAL MEDICINE

## 2020-11-20 PROCEDURE — G8484 FLU IMMUNIZE NO ADMIN: HCPCS | Performed by: INTERNAL MEDICINE

## 2020-11-20 PROCEDURE — G8427 DOCREV CUR MEDS BY ELIG CLIN: HCPCS | Performed by: INTERNAL MEDICINE

## 2020-11-20 PROCEDURE — 3017F COLORECTAL CA SCREEN DOC REV: CPT | Performed by: INTERNAL MEDICINE

## 2020-11-20 PROCEDURE — 3023F SPIROM DOC REV: CPT | Performed by: INTERNAL MEDICINE

## 2020-11-20 PROCEDURE — 99214 OFFICE O/P EST MOD 30 MIN: CPT | Performed by: INTERNAL MEDICINE

## 2020-11-20 PROCEDURE — G8420 CALC BMI NORM PARAMETERS: HCPCS | Performed by: INTERNAL MEDICINE

## 2020-11-20 RX ORDER — BUDESONIDE AND FORMOTEROL FUMARATE DIHYDRATE 160; 4.5 UG/1; UG/1
2 AEROSOL RESPIRATORY (INHALATION) 2 TIMES DAILY
Qty: 3 INHALER | Refills: 1 | Status: SHIPPED | OUTPATIENT
Start: 2020-11-20 | End: 2021-03-31 | Stop reason: SDUPTHER

## 2020-11-20 ASSESSMENT — SLEEP AND FATIGUE QUESTIONNAIRES
HOW LIKELY ARE YOU TO NOD OFF OR FALL ASLEEP WHILE SITTING INACTIVE IN A PUBLIC PLACE: 0
HOW LIKELY ARE YOU TO NOD OFF OR FALL ASLEEP WHILE SITTING QUIETLY AFTER LUNCH WITHOUT ALCOHOL: 1
ESS TOTAL SCORE: 6
HOW LIKELY ARE YOU TO NOD OFF OR FALL ASLEEP WHILE LYING DOWN TO REST IN THE AFTERNOON WHEN CIRCUMSTANCES PERMIT: 2
HOW LIKELY ARE YOU TO NOD OFF OR FALL ASLEEP WHILE SITTING AND READING: 1
HOW LIKELY ARE YOU TO NOD OFF OR FALL ASLEEP WHILE WATCHING TV: 1
HOW LIKELY ARE YOU TO NOD OFF OR FALL ASLEEP IN A CAR, WHILE STOPPED FOR A FEW MINUTES IN TRAFFIC: 0
HOW LIKELY ARE YOU TO NOD OFF OR FALL ASLEEP WHILE SITTING AND TALKING TO SOMEONE: 0
HOW LIKELY ARE YOU TO NOD OFF OR FALL ASLEEP WHEN YOU ARE A PASSENGER IN A CAR FOR AN HOUR WITHOUT A BREAK: 1

## 2020-11-20 NOTE — PROGRESS NOTES
Igor Artist  11/20/2020    Chief Complaint   Patient presents with   [address] from [facility]. V's blood o2 level at 79 for alcoholism   Chronic respiratory failure  COPD  Smoking  Alcohol abuse  Courtney Tsai  presented for follow up for COPD. She has been a chronic smoker and continues smoke. She was advised frequently to give up smoking. She was recently admitted to the hospital because of acute exacerbation and respiratory failure from which she recovered well. He is taking her bronchodilators regularly. He was advised to rinse her mouth and throat after the use of her inhaled steroids. No evidence of acute exacerbation of COPD. She contends she has given up some alcohol abuse. Has not noted any pedal edema no hemoptysis no chest pain he does not she does have home oxygen. She does use home oxygen. She is t a CO2 retainer. She also uses BiPAP at night. Review of Systems -unchanged  General ROS: negative for - chills, fatigue, fever or weight loss  ENT ROS: negative for - headaches, oral lesions or sore throat  Cardiovascular ROS: no chest pain , orthopnea or pnd   Gastrointestinal ROS: no abdominal pain, change in bowel habits, or black or bloody stools  Skin - no rash   Neuro - no blurry vision , no loc . No focal weakness   msk - no jt tenderness or swelling    Vascular - no claudication , rest completed and negative   Lymphatic - complete and negative   Hematology - oncology - complete and negative   Allergy immunology - complete and negative    no burning or hematuria       LUNG CANCER SCREENING     1. CRITERIA MET    []     CT ORDERED  []      2. CRITERIA NOT MET   [x]      3. REFUSED                    []        REASON CRITERIA NOT MET     1. SMOKING LESS THAN 30 PY  []      2. AGE LESS THAN 55 or GREATER 77 YEARS  []      3. QUIT SMOKING 15 YEARS OR GREATER   []      4. RECENT CT WITH IN 11 MONTHS    []      5. LIFE EXPECTANCY < 5 YEARS   []      6.  SIGNS AND SYMPTOMS OF LUNG CANCER   []           Immunization   Immunization History   Administered Date(s) Administered    Influenza Vaccine, unspecified formulation 10/18/2018    Influenza Virus Vaccine 11/01/2017, 10/18/2018, 09/10/2019    Influenza, Quadv, IM, (6 mo and older Fluzone, Flulaval, Fluarix and 3 yrs and older Afluria) 09/10/2019    MMR 09/27/2006        Pneumococcal Vaccine     [] Up to date    [x] Indicated   [] Refused  [] Contraindicated       Influenza Vaccine   [] Up to date    [x] Indicated   [] Refused  [] Contraindicated       PAST MEDICAL HISTORY:         Diagnosis Date    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 [address]     Depression     bipolar, major depressive disorder, ptsd, anxiety    Hypertension     Pain, joint, ankle and foot        Family History:       Problem Relation Age of Onset    Other Father     Cancer Maternal Grandmother     Heart Disease Paternal Grandmother     Esophageal Cancer Maternal Aunt        SURGICAL HISTORY:   Past Surgical History:   Procedure Laterality Date    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    COLONOSCOPY N/A 10/22/2020    COLONOSCOPY DIAGNOSTIC performed by Ilene Dumont MD at [facility] Left 7-3-13    ORIF tibial plateau    FRACTURE SURGERY Right     small finger metacarpal fracture    HYSTERECTOMY  2003    UPPER GASTROINTESTINAL ENDOSCOPY N/A 10/22/2020    EGD BIOPSY performed by Ilene Dumont MD at [facility]              Not in a hospital admission.   No Known Allergies  Social History     Tobacco Use   Smoking Status Current Every Day Smoker    Packs/day: 0.50    Years: 30.00    Pack years: 15.00    Types: Cigarettes   Smokeless Tobacco Never Used   Tobacco Comment    plans on quitting in the future      Prior to Admission medications    Medication Sig Start Date End Date Taking? Authorizing Provider   budesonide-formoterol (SYMBICORT) 160-4.5 MCG/ACT AERO Inhale 2 puffs into the lungs 2 times daily 11/20/20  Yes Anupama Edgar MD   lactulose (CHRONULAC) 10 GM/15ML solution Take 30 mLs by mouth 3 times daily 10/26/20  Yes Izaiah Gruber MD   chlordiazePOXIDE (LIBRIUM) 5 MG capsule Take 1 capsule by mouth 3 times daily as needed for Anxiety for up to 30 days.  10/26/20 11/25/20 Yes Izaiah Gruber MD   tiotropium (SPIRIVA RESPIMAT) 1.25 MCG/ACT AERS inhaler Inhale 2 puffs into the lungs daily 10/12/20  Yes Ludivina Carpio MD   tiZANidine (ZANAFLEX) 4 MG tablet Take 1 tablet by mouth every 8 hours as needed (back pain) 10/12/20  Yes Ludivina Carpio MD   potassium chloride (KLOR-CON M) 20 MEQ extended release tablet Take 1 tablet by mouth daily 9/10/20  Yes LOI Josue - CNP   magnesium carbonate (MAGONATE) 54 (Mag Equiv) MG/5ML LIQD oral liquid Take 5 mLs by mouth 3 times daily 7/16/20  Yes Vonda German MD   ferrous sulfate (IRON 325) 325 (65 Fe) MG tablet Take 1 tablet by mouth 2 times daily 7/16/20  Yes Ludivina Carpio MD   rOPINIRole (REQUIP) 1 MG tablet Take 1 tablet by mouth nightly 7/8/20  Yes Ludivina Carpio MD   ACETAMINOPHEN EXTRA STRENGTH 500 MG tablet Take 500 mg by mouth 3 times daily as needed 5/8/20  Yes Historical Provider, MD   propranolol (INDERAL) 40 MG tablet Take 40 mg by mouth 2 times daily 5/12/20  Yes Historical Provider, MD   albuterol sulfate HFA (VENTOLIN HFA) 108 (90 Base) MCG/ACT inhaler Inhale 2 puffs into the lungs 4 times daily as needed for Wheezing 6/11/20  Yes Ludivina Carpio MD   busPIRone (BUSPAR) 15 MG tablet Take 15 mg by mouth every 6 hours 6/11/20  Yes Ludivina Carpio MD   escitalopram (LEXAPRO) 20 MG tablet Take 1.5 tablets by mouth daily 6/11/20  Yes Ludivina Carpio MD   traZODone (DESYREL) 100 MG tablet Take 1 tablet by mouth nightly 6/11/20  Yes Ludivina Joel MD   carBAMazepine (TEGRETOL XR) 200 MG extended release tablet Take 1 tablet by mouth 3 times daily 6/11/20  Yes Ludivina Joel MD   amLODIPine (NORVASC) 5 MG tablet Take 1 tablet by mouth daily 6/11/20  Yes Manjit Mckee MD   gabapentin (NEURONTIN) 100 MG capsule Take 2 capsules by mouth 3 times daily for 180 days.  Intended supply: 90 days 6/11/20 12/8/20 Yes Ludivina Joel MD   hydrOXYzine (ATARAX) 50 MG tablet Take 1 tablet by mouth 3 times daily 6/11/20  Yes Ludivina Joel MD   vitamin B-1 (THIAMINE) 100 MG tablet Take 1 tablet by mouth daily 7/12/19  Yes Ludivina Joel MD   vitamin D (ERGOCALCIFEROL) 69039 units CAPS capsule Take 1 capsule by mouth once a week 6/19/19  Yes Ludivina Joel MD   folic acid (FOLVITE) 1 MG tablet Take 1 tablet by mouth daily 6/19/19  Yes Manjit Mckee MD         Physical Exam  General Appearance:    Alert, cooperative, no distress, appears stated age] no distress pink puffer   Head:    Normocephalic, without obvious abnormality, atraumatic   Eye examination normal no Malik syndrome  Nose examination normal no polyps    Throat examination unremarkable    Ear examination normal               :    Neck:   Supple, symmetrical, trachea midline, no adenopathy;     thyroid:  no enlargement/tenderness/nodules; no carotid    bruit or JVD   Back:     Symmetric, no curvature, ROM normal, no CVA tenderness   Lungs:      AP diameter is increased percussion note is hyperresonant expiration prolonged no rales rhonchi are audible or friction rub is audible   Chest Wall:    No tenderness or deformity      Heart:    Regular rate and rhythm, S1 and S2 normal, no murmur, rub        or gallop no rvh                           Abdomen:                                                 Pulses:                                            Lymph nodes:                    Neurologic:                  Soft, non-tender, bowel sounds active all four quadrants,     no masses, no organomegaly         2+ and symmetric all extremities            Cervical, supraclavicular not enlarged or matted or tender      CNII-XII intact, normal strength 5/5 . Sensation grossly normal  and reflexes normal 2+  throughout     Clubbing No  Lower ext edema No1+   [] , 2 +  [] , 3+   []  Upper ext edema No       Musculoskeletal - no joint swelling or tenderness or synovitis               /73 (Site: Left Upper Arm, Position: Sitting, Cuff Size: Medium Adult)   Pulse 127   Temp 98.1 °F (36.7 °C) (Temporal)   Resp 20   Ht 5' 5\" (1.651 m)   Wt 113 lb (51.3 kg)   SpO2 99% Comment: room air at rest  BMI 18.80 kg/m²     CXR  Consistent with COPD      CT Scans  No recent CT    Echo  Echo was done in the hospital.        Assessment   Diagnosis Orders   1. Chronic respiratory failure, unspecified whether with hypoxia or hypercapnia ([address]  UT DISCHARGE MEDS RECONCILED W/ CURRENT OUTPATIENT MED LIST   2. COPD exacerbation ([address]     3. Chronic bronchitis, unspecified chronic bronchitis type ([address]     4. Alcoholism ([address]     5. Tobacco use     6. Acute respiratory failure, unspecified whether with hypoxia or hypercapnia ([address]         Plan:  Patient was strongly advised to give up smoking altogether. We will be happy to give her pharmacological help if he desires so. However she has to make up her mind first.    She seemed to have given up smoking according the patient. She will continue bronchodilators as before    Continue supplemental oxygen. Continue use of BiPAP at night    He was advised to increase her caloric intake. Electronically signed by Kiki Woo MD on   11/20/20 at 11:10 AM EST  Please note that this chart was generated using voice recognition Dragon dictation software. Although every effort was made to ensure the accuracy of this automated transcription, some errors in transcription may have occurred.

## 2020-11-27 ENCOUNTER — TELEMEDICINE (OUTPATIENT)
Dept: PSYCHIATRY | Age: 50
End: 2020-11-27
Payer: MEDICARE

## 2020-11-27 ENCOUNTER — HOSPITAL ENCOUNTER (OUTPATIENT)
Dept: PREADMISSION TESTING | Age: 50
Setting detail: SPECIMEN
Discharge: HOME | End: 2020-12-01
Payer: MEDICARE

## 2020-11-27 PROBLEM — F10.21 ALCOHOL USE DISORDER, SEVERE, IN EARLY REMISSION (HCC): Status: ACTIVE | Noted: 2020-10-23

## 2020-11-27 PROCEDURE — 99213 OFFICE O/P EST LOW 20 MIN: CPT | Performed by: NURSE PRACTITIONER

## 2020-11-27 PROCEDURE — U0003 INFECTIOUS AGENT DETECTION BY NUCLEIC ACID (DNA OR RNA); SEVERE ACUTE RESPIRATORY SYNDROME CORONAVIRUS 2 (SARS-COV-2) (CORONAVIRUS DISEASE [COVID-19]), AMPLIFIED PROBE TECHNIQUE, MAKING USE OF HIGH THROUGHPUT TECHNOLOGIES AS DESCRIBED BY CMS-2020-01-R: HCPCS

## 2020-11-27 PROCEDURE — G8427 DOCREV CUR MEDS BY ELIG CLIN: HCPCS | Performed by: NURSE PRACTITIONER

## 2020-11-27 PROCEDURE — 3017F COLORECTAL CA SCREEN DOC REV: CPT | Performed by: NURSE PRACTITIONER

## 2020-11-27 RX ORDER — ESCITALOPRAM OXALATE 10 MG/1
10 TABLET ORAL DAILY
Qty: 7 TABLET | Refills: 0 | Status: SHIPPED | OUTPATIENT
Start: 2020-11-27 | End: 2020-12-18
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
## 2020-11-28 LAB
SARS-COV-2, RAPID: NORMAL
SARS-COV-2: NORMAL
SARS-COV-2: NOT DETECTED
SOURCE: NORMAL

## 2020-11-29 ENCOUNTER — TELEPHONE (OUTPATIENT)
Dept: PRIMARY CARE CLINIC | Age: 50
End: 2020-11-29

## 2020-12-05 ENCOUNTER — HOSPITAL ENCOUNTER (OUTPATIENT)
Dept: LAB | Age: 50
Setting detail: SPECIMEN
Discharge: HOME | End: 2020-12-05
Payer: MEDICARE

## 2020-12-05 PROCEDURE — U0003 INFECTIOUS AGENT DETECTION BY NUCLEIC ACID (DNA OR RNA); SEVERE ACUTE RESPIRATORY SYNDROME CORONAVIRUS 2 (SARS-COV-2) (CORONAVIRUS DISEASE [COVID-19]), AMPLIFIED PROBE TECHNIQUE, MAKING USE OF HIGH THROUGHPUT TECHNOLOGIES AS DESCRIBED BY CMS-2020-01-R: HCPCS

## 2020-12-07 ENCOUNTER — TELEPHONE (OUTPATIENT)
Dept: FAMILY MEDICINE CLINIC | Age: 50
End: 2020-12-07

## 2020-12-07 RX ORDER — LACTULOSE 10 G/15ML
20 SOLUTION ORAL 3 TIMES DAILY
Qty: 1 BOTTLE | Refills: 1 | Status: SHIPPED | OUTPATIENT
Start: 2020-12-07 | End: 2020-12-15
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
## 2020-12-08 ENCOUNTER — TELEPHONE (OUTPATIENT)
Dept: PSYCHIATRY | Age: 50
End: 2020-12-08

## 2020-12-08 ENCOUNTER — ANESTHESIA EVENT (OUTPATIENT)
Dept: ENDOSCOPY | Age: 50
End: 2020-12-08
Payer: MEDICARE

## 2020-12-08 LAB — SARS-COV-2, NAA: NOT DETECTED

## 2020-12-08 RX ORDER — ACAMPROSATE CALCIUM 333 MG/1
666 TABLET, DELAYED RELEASE ORAL 3 TIMES DAILY
Qty: 180 TABLET | Refills: 0 | Status: SHIPPED | OUTPATIENT
Start: 2020-12-08 | End: 2020-12-18 | Stop reason: SDUPTHER

## 2020-12-08 NOTE — TELEPHONE ENCOUNTER
.Please Approve or Refuse  Send to Pharmacy per Pt's Request:      Next Visit Date:  12/18/2020   Last Visit Date: 11/27/2020

## 2020-12-09 ENCOUNTER — TELEPHONE (OUTPATIENT)
Dept: PRIMARY CARE CLINIC | Age: 50
End: 2020-12-09

## 2020-12-09 ENCOUNTER — HOSPITAL ENCOUNTER (OUTPATIENT)
Age: 50
Setting detail: OUTPATIENT SURGERY
Discharge: HOME | End: 2020-12-09
Attending: INTERNAL MEDICINE | Admitting: INTERNAL MEDICINE
Payer: MEDICARE

## 2020-12-09 ENCOUNTER — ANESTHESIA (OUTPATIENT)
Dept: ENDOSCOPY | Age: 50
End: 2020-12-09
Payer: MEDICARE

## 2020-12-09 VITALS
WEIGHT: 118 LBS | SYSTOLIC BLOOD PRESSURE: 157 MMHG | RESPIRATION RATE: 16 BRPM | BODY MASS INDEX: 19.66 KG/M2 | DIASTOLIC BLOOD PRESSURE: 99 MMHG | TEMPERATURE: 98.7 F | OXYGEN SATURATION: 93 % | HEIGHT: 65 IN | HEART RATE: 89 BPM

## 2020-12-09 VITALS — SYSTOLIC BLOOD PRESSURE: 147 MMHG | OXYGEN SATURATION: 94 % | DIASTOLIC BLOOD PRESSURE: 98 MMHG | TEMPERATURE: 96.8 F

## 2020-12-09 PROCEDURE — 2580000003 HC RX 258: Performed by: ANESTHESIOLOGY

## 2020-12-09 PROCEDURE — 7100000000 HC PACU RECOVERY - FIRST 15 MIN: Performed by: INTERNAL MEDICINE

## 2020-12-09 PROCEDURE — 7100000001 HC PACU RECOVERY - ADDTL 15 MIN: Performed by: INTERNAL MEDICINE

## 2020-12-09 PROCEDURE — 7100000030 HC ASPR PHASE II RECOVERY - FIRST 15 MIN: Performed by: INTERNAL MEDICINE

## 2020-12-09 PROCEDURE — 3609018500 HC EGD US SCOPE W/ADJACENT STRUCTURES: Performed by: INTERNAL MEDICINE

## 2020-12-09 PROCEDURE — 88305 TISSUE EXAM BY PATHOLOGIST: CPT

## 2020-12-09 PROCEDURE — 3700000000 HC ANESTHESIA ATTENDED CARE: Performed by: INTERNAL MEDICINE

## 2020-12-09 PROCEDURE — 2500000003 HC RX 250 WO HCPCS: Performed by: NURSE ANESTHETIST, CERTIFIED REGISTERED

## 2020-12-09 PROCEDURE — 6360000002 HC RX W HCPCS: Performed by: NURSE ANESTHETIST, CERTIFIED REGISTERED

## 2020-12-09 PROCEDURE — 3700000001 HC ADD 15 MINUTES (ANESTHESIA): Performed by: INTERNAL MEDICINE

## 2020-12-09 PROCEDURE — 88333 PATH CONSLTJ SURG CYTO XM 1: CPT

## 2020-12-09 PROCEDURE — 2720000010 HC SURG SUPPLY STERILE: Performed by: INTERNAL MEDICINE

## 2020-12-09 PROCEDURE — 2709999900 HC NON-CHARGEABLE SUPPLY: Performed by: INTERNAL MEDICINE

## 2020-12-09 PROCEDURE — 7100000031 HC ASPR PHASE II RECOVERY - ADDTL 15 MIN: Performed by: INTERNAL MEDICINE

## 2020-12-09 PROCEDURE — 43242 EGD US FINE NEEDLE BX/ASPIR: CPT | Performed by: INTERNAL MEDICINE

## 2020-12-09 RX ORDER — LIDOCAINE HYDROCHLORIDE 10 MG/ML
1 INJECTION, SOLUTION EPIDURAL; INFILTRATION; INTRACAUDAL; PERINEURAL
Status: DISCONTINUED | OUTPATIENT
Start: 2020-12-09 | End: 2020-12-09 | Stop reason: HOSPADM

## 2020-12-09 RX ORDER — PROPOFOL 10 MG/ML
INJECTION, EMULSION INTRAVENOUS PRN
Status: DISCONTINUED | OUTPATIENT
Start: 2020-12-09 | End: 2020-12-09 | Stop reason: SDUPTHER

## 2020-12-09 RX ORDER — LIDOCAINE HYDROCHLORIDE 20 MG/ML
INJECTION, SOLUTION EPIDURAL; INFILTRATION; INTRACAUDAL; PERINEURAL PRN
Status: DISCONTINUED | OUTPATIENT
Start: 2020-12-09 | End: 2020-12-09 | Stop reason: SDUPTHER

## 2020-12-09 RX ORDER — SODIUM CHLORIDE, SODIUM LACTATE, POTASSIUM CHLORIDE, CALCIUM CHLORIDE 600; 310; 30; 20 MG/100ML; MG/100ML; MG/100ML; MG/100ML
INJECTION, SOLUTION INTRAVENOUS CONTINUOUS
Status: DISCONTINUED | OUTPATIENT
Start: 2020-12-09 | End: 2020-12-09 | Stop reason: HOSPADM

## 2020-12-09 RX ORDER — SODIUM CHLORIDE 0.9 % (FLUSH) 0.9 %
10 SYRINGE (ML) INJECTION PRN
Status: DISCONTINUED | OUTPATIENT
Start: 2020-12-09 | End: 2020-12-09 | Stop reason: HOSPADM

## 2020-12-09 RX ORDER — OXYCODONE HYDROCHLORIDE AND ACETAMINOPHEN 5; 325 MG/1; MG/1
1 TABLET ORAL PRN
Status: DISCONTINUED | OUTPATIENT
Start: 2020-12-09 | End: 2020-12-09 | Stop reason: HOSPADM

## 2020-12-09 RX ORDER — ONDANSETRON 2 MG/ML
4 INJECTION INTRAMUSCULAR; INTRAVENOUS
Status: DISCONTINUED | OUTPATIENT
Start: 2020-12-09 | End: 2020-12-09 | Stop reason: HOSPADM

## 2020-12-09 RX ORDER — LABETALOL HYDROCHLORIDE 5 MG/ML
5 INJECTION, SOLUTION INTRAVENOUS EVERY 10 MIN PRN
Status: DISCONTINUED | OUTPATIENT
Start: 2020-12-09 | End: 2020-12-09 | Stop reason: HOSPADM

## 2020-12-09 RX ORDER — DIPHENHYDRAMINE HYDROCHLORIDE 50 MG/ML
12.5 INJECTION INTRAMUSCULAR; INTRAVENOUS
Status: DISCONTINUED | OUTPATIENT
Start: 2020-12-09 | End: 2020-12-09 | Stop reason: HOSPADM

## 2020-12-09 RX ORDER — OXYCODONE HYDROCHLORIDE AND ACETAMINOPHEN 5; 325 MG/1; MG/1
2 TABLET ORAL PRN
Status: DISCONTINUED | OUTPATIENT
Start: 2020-12-09 | End: 2020-12-09 | Stop reason: HOSPADM

## 2020-12-09 RX ORDER — SODIUM CHLORIDE 0.9 % (FLUSH) 0.9 %
10 SYRINGE (ML) INJECTION EVERY 12 HOURS SCHEDULED
Status: DISCONTINUED | OUTPATIENT
Start: 2020-12-09 | End: 2020-12-09 | Stop reason: HOSPADM

## 2020-12-09 RX ADMIN — LIDOCAINE HYDROCHLORIDE 80 MG: 20 INJECTION, SOLUTION EPIDURAL; INFILTRATION; INTRACAUDAL; PERINEURAL at 09:30

## 2020-12-09 RX ADMIN — PROPOFOL 30 MG: 10 INJECTION, EMULSION INTRAVENOUS at 09:53

## 2020-12-09 RX ADMIN — PROPOFOL 30 MG: 10 INJECTION, EMULSION INTRAVENOUS at 09:57

## 2020-12-09 RX ADMIN — PROPOFOL 30 MG: 10 INJECTION, EMULSION INTRAVENOUS at 09:33

## 2020-12-09 RX ADMIN — PROPOFOL 30 MG: 10 INJECTION, EMULSION INTRAVENOUS at 09:40

## 2020-12-09 RX ADMIN — PROPOFOL 30 MG: 10 INJECTION, EMULSION INTRAVENOUS at 10:09

## 2020-12-09 RX ADMIN — SODIUM CHLORIDE, POTASSIUM CHLORIDE, SODIUM LACTATE AND CALCIUM CHLORIDE: 600; 310; 30; 20 INJECTION, SOLUTION INTRAVENOUS at 09:00

## 2020-12-09 RX ADMIN — PROPOFOL 30 MG: 10 INJECTION, EMULSION INTRAVENOUS at 09:50

## 2020-12-09 RX ADMIN — PROPOFOL 60 MG: 10 INJECTION, EMULSION INTRAVENOUS at 09:30

## 2020-12-09 RX ADMIN — PROPOFOL 30 MG: 10 INJECTION, EMULSION INTRAVENOUS at 10:05

## 2020-12-09 RX ADMIN — PROPOFOL 30 MG: 10 INJECTION, EMULSION INTRAVENOUS at 09:44

## 2020-12-09 RX ADMIN — PROPOFOL 30 MG: 10 INJECTION, EMULSION INTRAVENOUS at 10:00

## 2020-12-09 RX ADMIN — PROPOFOL 30 MG: 10 INJECTION, EMULSION INTRAVENOUS at 09:36

## 2020-12-09 RX ADMIN — PROPOFOL 30 MG: 10 INJECTION, EMULSION INTRAVENOUS at 09:47

## 2020-12-09 ASSESSMENT — PULMONARY FUNCTION TESTS
PIF_VALUE: 0
PIF_VALUE: 1
PIF_VALUE: 0

## 2020-12-09 ASSESSMENT — PAIN - FUNCTIONAL ASSESSMENT: PAIN_FUNCTIONAL_ASSESSMENT: 0-10

## 2020-12-09 ASSESSMENT — PAIN DESCRIPTION - DESCRIPTORS: DESCRIPTORS: ACHING;DISCOMFORT

## 2020-12-09 ASSESSMENT — PAIN SCALES - GENERAL
PAINLEVEL_OUTOF10: 0
PAINLEVEL_OUTOF10: 0

## 2020-12-09 ASSESSMENT — LIFESTYLE VARIABLES: SMOKING_STATUS: 1

## 2020-12-09 NOTE — OP NOTE
[facility]     PROCEDURE DATE: 12/09/20    REFERRING PHYSICIAN: No ref. provider found     PRIMARY CARE PROVIDER: LIZBETH Ingram MD    ATTENDING PHYSICIAN: Aleyda Enrique MD     HISTORY: Ms. Mariaelena Mcelroy is a 45 y.o. female who presents to the [place] [address] Endoscopy unit for EUS. The patient's clinical history is remarkable for  Pancreatic lesion. She is currently medically stable and appropriate for the planned procedure. PREPROCEDURE DIAGNOSIS:  Pancreatic lesion    POSTPROCEDURE DIAGNOSIS:  37 x 30 mm mass in HOP  2 suspicious lymph nodes  Chronic pancreatitis      PROCEDURES PERFORMED:   1. EUS. 2. FNB     SPECIMENS: FNB      INSTRUMENTS:   1. Pentax Linear [id number] Echoendoscope      MEDICATIONS:   MAC as per anesthesia    EBL: minimal    PREPARATION: After the risks, benefits and alternatives of the procedure were discussed, informed consent was obtained. Complications were said to include, but were not limited to: medication allergy, medication reaction, cardiovascular and respiratory problems, bleeding, perforation, infection, pancreatitis, aspiration, and/or missed diagnosis. Following arrival in the endoscopy room, the patient was placed in the supine position and a final time-out was performed in the presence of the nursing staff. The patient was then sedated and intubated, and the examination was started. FINDINGS: The Echoendoscope was inserted into the oropharynx under direct visualization and advanced smoothly into the stomach where a small gastric pool was suctioned. A cursory view of the gastric mucosa was normal. The scope was then further advanced through a patent pylorus to the second portion of the duodenum. Pancreas: Diffusely heterogenous pancreas seen in the examined pancreas. PD in the head was 4.7 mm mm, in the body 2.7 mm and in the tail 1.9 mm in maximum cross sectional diameter. A round mass was identified in the pancreatic head. The mass was hypoechoic, heterogenous and solid. The mass measured 37 mm by 30 mm in maximal cross-sectional diameter. The endosonographic borders were well-defined. There was no sonographic evidence to suggest invasion into the portal vein. An intact interface was seen between the mass and the celiac trunk and superior mesenteric artery suggesting a lack of invasion. Fine needle biopsy was performed. Color Doppler imaging was utilized prior to needle puncture to confirm a lack of significant vascular structures within the needle path. Three passes were made with the 25 gauge ultrasound biopsy needle using a transduodenal approach. A stylet and suction was used. A cytologist was present and performed a preliminary cytologic examination. Preliminary cytology suspicious changes (final results are pending). Estimated blood loss was minimal. Verification of patient identification for the specimen was done by the physician and nurse using the patient's name and medical record number. CBD: Normal, no stones, sludge. CBD diameter: 11 mm. Gallbladder: Possible sludge     Left adrenal: Normal.      Ampulla: Normal.     Left lobe of liver: no pathology      Lymphadenopathy: Two suspicious lymph nodes noted around head of pancreas (9.5 and 8.0 mm)        RECOMMENDATIONS:   1. Follow up on pathology report.    2. Follow up with referring physician         Clarissa Alvarez
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
## 2020-12-09 NOTE — ANESTHESIA POSTPROCEDURE EVALUATION
Department of Anesthesiology  Postprocedure Note    Patient: Leighton Birmingham  MRN: [id number]  YOB: 1970  Date of evaluation: 12/9/2020  Time:  12:32 PM     Procedure Summary     Date:  12/09/20 Room / Location:  [place] [address] / [facility]    Anesthesia Start:  6802 Anesthesia Stop:  3992    Procedure:  ENDOSCOPIC ULTRASOUND, UPPER WITH LINEAR SCOPE FOR BIOPSY OF MASS ON HEAD OF PANCREAS (N/A Esophagus) Diagnosis:  (PANCREATIC LESION (PAT PER ANESTHESIA ON ADMIT))    Surgeon:  Audrey Saha MD Responsible Provider:  Mariela Escamilla MD    Anesthesia Type:  MAC ASA Status:  3          Anesthesia Type: MAC    Marcell Phase I: Marcell Score: 9    Marcell Phase II: Marcell Score: 10    Last vitals: Reviewed and per EMR flowsheets.        Anesthesia Post Evaluation    Comments: POST- ANESTHESIA EVALUATION       Pt Name: Leighton Birmingham  MRN: [id number]  YOB: 1970  Date of evaluation: 12/9/2020  Time:  12:32 PM      BP (!) 157/99   Pulse 89   Temp 98.7 °F (37.1 °C) (Temporal)   Resp 16   Ht 5' 5\" (1.651 m)   Wt 118 lb (53.5 kg)   SpO2 93%   BMI 19.64 kg/m²      Consciousness Level  Awake  Cardiopulmonary Status  Stable  Pain Adequately Treated YES  Nausea / Vomiting  NO  Adequate Hydration  YES  Anesthesia Related Complications NONE      Electronically signed by Mariela Escamilla MD on 12/9/2020 at 12:32 PM

## 2020-12-10 ENCOUNTER — TELEPHONE (OUTPATIENT)
Dept: PSYCHIATRY | Age: 50
End: 2020-12-10

## 2020-12-10 LAB — SURGICAL PATHOLOGY REPORT: NORMAL

## 2020-12-10 NOTE — TELEPHONE ENCOUNTER
Pt is wondering if she is to take the Lexapro 10mg for 7 days then on day 8 start the zoloft? Or is she taking Lexapro 10mg along with the Zoloft?   Please advise

## 2020-12-15 RX ORDER — LACTULOSE 10 G/15ML
20 SOLUTION ORAL 3 TIMES DAILY
Qty: 2700 ML | Refills: 2 | Status: SHIPPED | OUTPATIENT
Start: 2020-12-15 | End: 2020-12-28 | Stop reason: ALTCHOICE

## 2020-12-18 ENCOUNTER — VIRTUAL VISIT (OUTPATIENT)
Dept: PSYCHIATRY | Age: 50
End: 2020-12-18
Payer: MEDICARE

## 2020-12-18 PROCEDURE — G8428 CUR MEDS NOT DOCUMENT: HCPCS | Performed by: NURSE PRACTITIONER

## 2020-12-18 PROCEDURE — 3017F COLORECTAL CA SCREEN DOC REV: CPT | Performed by: NURSE PRACTITIONER

## 2020-12-18 PROCEDURE — 99213 OFFICE O/P EST LOW 20 MIN: CPT | Performed by: NURSE PRACTITIONER

## 2020-12-18 RX ORDER — ACAMPROSATE CALCIUM 333 MG/1
666 TABLET, DELAYED RELEASE ORAL 3 TIMES DAILY
Qty: 180 TABLET | Refills: 0 | Status: SHIPPED | OUTPATIENT
Start: 2020-12-18 | End: 2021-01-25 | Stop reason: SDUPTHER
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
## 2020-12-21 ENCOUNTER — TELEPHONE (OUTPATIENT)
Dept: FAMILY MEDICINE CLINIC | Age: 50
End: 2020-12-21

## 2020-12-21 NOTE — TELEPHONE ENCOUNTER
Patient has appointment 12/28 and would like to have labs drawn prior to seeing you.   States she would like ammonia levels, magnesium and any other levels necessary ordered

## 2020-12-23 ENCOUNTER — HOSPITAL ENCOUNTER (OUTPATIENT)
Age: 50
Discharge: HOME | End: 2020-12-23
Payer: COMMERCIAL

## 2020-12-23 LAB
ABSOLUTE EOS #: 0.1 K/UL (ref 0–0.4)
ABSOLUTE IMMATURE GRANULOCYTE: ABNORMAL K/UL (ref 0–0.3)
ABSOLUTE LYMPH #: 2.6 K/UL (ref 1–4.8)
ABSOLUTE MONO #: 0.7 K/UL (ref 0.1–1.3)
ALBUMIN SERPL-MCNC: 3.3 G/DL (ref 3.5–5.2)
ALBUMIN/GLOBULIN RATIO: ABNORMAL (ref 1–2.5)
ALP BLD-CCNC: 164 U/L (ref 35–104)
ALT SERPL-CCNC: 7 U/L (ref 5–33)
AMMONIA: 13 UMOL/L (ref 11–51)
ANION GAP SERPL CALCULATED.3IONS-SCNC: 9 MMOL/L (ref 9–17)
AST SERPL-CCNC: 24 U/L
BASOPHILS # BLD: 1 % (ref 0–2)
BASOPHILS ABSOLUTE: 0 K/UL (ref 0–0.2)
BILIRUB SERPL-MCNC: 0.17 MG/DL (ref 0.3–1.2)
BUN BLDV-MCNC: 5 MG/DL (ref 6–20)
BUN/CREAT BLD: ABNORMAL (ref 9–20)
CALCIUM SERPL-MCNC: 9.8 MG/DL (ref 8.6–10.4)
CHLORIDE BLD-SCNC: 102 MMOL/L (ref 98–107)
CHOLESTEROL, FASTING: 203 MG/DL
CHOLESTEROL/HDL RATIO: 4.8
CO2: 27 MMOL/L (ref 20–31)
CREAT SERPL-MCNC: 0.54 MG/DL (ref 0.5–0.9)
DIFFERENTIAL TYPE: ABNORMAL
EOSINOPHILS RELATIVE PERCENT: 2 % (ref 0–4)
FERRITIN: 100 UG/L (ref 13–150)
FOLATE: >20 NG/ML
GFR AFRICAN AMERICAN: >60 ML/MIN
GFR NON-AFRICAN AMERICAN: >60 ML/MIN
GFR SERPL CREATININE-BSD FRML MDRD: ABNORMAL ML/MIN/{1.73_M2}
GFR SERPL CREATININE-BSD FRML MDRD: ABNORMAL ML/MIN/{1.73_M2}
GLUCOSE BLD-MCNC: 109 MG/DL (ref 70–99)
HCT VFR BLD CALC: 40.1 % (ref 36–46)
HDLC SERPL-MCNC: 42 MG/DL
HEMOGLOBIN: 13.4 G/DL (ref 12–16)
IMMATURE GRANULOCYTES: ABNORMAL %
IRON SATURATION: 40 % (ref 20–55)
IRON: 105 UG/DL (ref 37–145)
LDL CHOLESTEROL: 134 MG/DL (ref 0–130)
LYMPHOCYTES # BLD: 37 % (ref 24–44)
MAGNESIUM: 1.5 MG/DL (ref 1.6–2.6)
MCH RBC QN AUTO: 32 PG (ref 26–34)
MCHC RBC AUTO-ENTMCNC: 33.3 G/DL (ref 31–37)
MCV RBC AUTO: 96.1 FL (ref 80–100)
MONOCYTES # BLD: 10 % (ref 1–7)
NRBC AUTOMATED: ABNORMAL PER 100 WBC
PDW BLD-RTO: 14.9 % (ref 11.5–14.9)
PLATELET # BLD: 617 K/UL (ref 150–450)
PLATELET ESTIMATE: ABNORMAL
PMV BLD AUTO: 7.7 FL (ref 6–12)
POTASSIUM SERPL-SCNC: 4.7 MMOL/L (ref 3.7–5.3)
RBC # BLD: 4.17 M/UL (ref 4–5.2)
RBC # BLD: ABNORMAL 10*6/UL
SEG NEUTROPHILS: 50 % (ref 36–66)
SEGMENTED NEUTROPHILS ABSOLUTE COUNT: 3.6 K/UL (ref 1.3–9.1)
SODIUM BLD-SCNC: 138 MMOL/L (ref 135–144)
TOTAL IRON BINDING CAPACITY: 261 UG/DL (ref 250–450)
TOTAL PROTEIN: 7.2 G/DL (ref 6.4–8.3)
TRIGLYCERIDE, FASTING: 136 MG/DL
TSH SERPL DL<=0.05 MIU/L-ACNC: 0.68 MIU/L (ref 0.3–5)
UNSATURATED IRON BINDING CAPACITY: 156 UG/DL (ref 112–347)
VITAMIN B-12: 629 PG/ML (ref 232–1245)
VITAMIN D 25-HYDROXY: 56.2 NG/ML (ref 30–100)
VLDLC SERPL CALC-MCNC: ABNORMAL MG/DL (ref 1–30)
WBC # BLD: 7 K/UL (ref 3.5–11)
WBC # BLD: ABNORMAL 10*3/UL

## 2020-12-23 PROCEDURE — 82306 VITAMIN D 25 HYDROXY: CPT

## 2020-12-23 PROCEDURE — 82607 VITAMIN B-12: CPT

## 2020-12-23 PROCEDURE — 83735 ASSAY OF MAGNESIUM: CPT

## 2020-12-23 PROCEDURE — 82140 ASSAY OF AMMONIA: CPT

## 2020-12-23 PROCEDURE — 83550 IRON BINDING TEST: CPT

## 2020-12-23 PROCEDURE — 82728 ASSAY OF FERRITIN: CPT

## 2020-12-23 PROCEDURE — 83540 ASSAY OF IRON: CPT

## 2020-12-23 PROCEDURE — 85025 COMPLETE CBC W/AUTO DIFF WBC: CPT

## 2020-12-23 PROCEDURE — 82746 ASSAY OF FOLIC ACID SERUM: CPT

## 2020-12-23 PROCEDURE — 36415 COLL VENOUS BLD VENIPUNCTURE: CPT

## 2020-12-23 PROCEDURE — 80061 LIPID PANEL: CPT

## 2020-12-23 PROCEDURE — 84443 ASSAY THYROID STIM HORMONE: CPT

## 2020-12-23 PROCEDURE — 80053 COMPREHEN METABOLIC PANEL: CPT

## 2020-12-28 ENCOUNTER — OFFICE VISIT (OUTPATIENT)
Dept: FAMILY MEDICINE CLINIC | Age: 50
End: 2020-12-28
Payer: COMMERCIAL

## 2020-12-28 VITALS
HEART RATE: 98 BPM | TEMPERATURE: 97.7 F | SYSTOLIC BLOOD PRESSURE: 120 MMHG | BODY MASS INDEX: 18.97 KG/M2 | DIASTOLIC BLOOD PRESSURE: 80 MMHG | WEIGHT: 114 LBS | OXYGEN SATURATION: 97 %

## 2020-12-28 DIAGNOSIS — K29.20 ACUTE ALCOHOLIC GASTRITIS WITHOUT HEMORRHAGE: ICD-10-CM

## 2020-12-28 DIAGNOSIS — G62.9 PERIPHERAL POLYNEUROPATHY: ICD-10-CM

## 2020-12-28 DIAGNOSIS — F10.920 ACUTE ALCOHOLIC INTOXICATION WITHOUT COMPLICATION (HCC): ICD-10-CM

## 2020-12-28 DIAGNOSIS — F33.1 MODERATE EPISODE OF RECURRENT MAJOR DEPRESSIVE DISORDER (HCC): ICD-10-CM

## 2020-12-28 DIAGNOSIS — Z23 NEED FOR TETANUS, DIPHTHERIA, AND ACELLULAR PERTUSSIS (TDAP) VACCINE: ICD-10-CM

## 2020-12-28 DIAGNOSIS — R56.9 SEIZURES (HCC): ICD-10-CM

## 2020-12-28 DIAGNOSIS — C71.9 ASTROCYTOMA (HCC): ICD-10-CM

## 2020-12-28 DIAGNOSIS — Z23 NEED FOR 23-POLYVALENT PNEUMOCOCCAL POLYSACCHARIDE VACCINE: ICD-10-CM

## 2020-12-28 DIAGNOSIS — M79.604 PAIN IN BOTH LOWER EXTREMITIES: Primary | ICD-10-CM

## 2020-12-28 DIAGNOSIS — J44.9 CHRONIC OBSTRUCTIVE PULMONARY DISEASE, UNSPECIFIED COPD TYPE (HCC): ICD-10-CM

## 2020-12-28 DIAGNOSIS — M79.605 PAIN IN BOTH LOWER EXTREMITIES: Primary | ICD-10-CM

## 2020-12-28 PROCEDURE — 90471 IMMUNIZATION ADMIN: CPT | Performed by: INTERNAL MEDICINE

## 2020-12-28 PROCEDURE — G8420 CALC BMI NORM PARAMETERS: HCPCS | Performed by: INTERNAL MEDICINE

## 2020-12-28 PROCEDURE — 4004F PT TOBACCO SCREEN RCVD TLK: CPT | Performed by: INTERNAL MEDICINE

## 2020-12-28 PROCEDURE — G8427 DOCREV CUR MEDS BY ELIG CLIN: HCPCS | Performed by: INTERNAL MEDICINE

## 2020-12-28 PROCEDURE — G8484 FLU IMMUNIZE NO ADMIN: HCPCS | Performed by: INTERNAL MEDICINE

## 2020-12-28 PROCEDURE — 90715 TDAP VACCINE 7 YRS/> IM: CPT | Performed by: INTERNAL MEDICINE

## 2020-12-28 PROCEDURE — 90732 PPSV23 VACC 2 YRS+ SUBQ/IM: CPT | Performed by: INTERNAL MEDICINE

## 2020-12-28 PROCEDURE — 90472 IMMUNIZATION ADMIN EACH ADD: CPT | Performed by: INTERNAL MEDICINE

## 2020-12-28 PROCEDURE — 3017F COLORECTAL CA SCREEN DOC REV: CPT | Performed by: INTERNAL MEDICINE

## 2020-12-28 PROCEDURE — 99214 OFFICE O/P EST MOD 30 MIN: CPT | Performed by: INTERNAL MEDICINE

## 2020-12-28 PROCEDURE — 3023F SPIROM DOC REV: CPT | Performed by: INTERNAL MEDICINE

## 2020-12-28 RX ORDER — CELECOXIB 100 MG/1
100 CAPSULE ORAL DAILY
Qty: 30 CAPSULE | Refills: 3 | Status: SHIPPED | OUTPATIENT
Start: 2020-12-28 | End: 2021-05-04

## 2020-12-28 RX ORDER — ECHINACEA PURPUREA EXTRACT 125 MG
1 TABLET ORAL PRN
Qty: 1 BOTTLE | Refills: 3 | Status: SHIPPED | OUTPATIENT
Start: 2020-12-28 | End: 2021-10-04 | Stop reason: ALTCHOICE

## 2020-12-28 RX ORDER — GABAPENTIN 300 MG/1
300 CAPSULE ORAL 3 TIMES DAILY
Qty: 90 CAPSULE | Refills: 5 | Status: SHIPPED
Start: 2020-12-28 | End: 2021-03-31 | Stop reason: ALTCHOICE
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
## 2020-12-28 NOTE — PROGRESS NOTES
Pt is here today for a follow up. She states her legs and feet have been hurting her for the past 6 months.  She is also having some numbness in her feet, semi consistant

## 2021-01-08 ASSESSMENT — ENCOUNTER SYMPTOMS
CHOKING: 0
BLOOD IN STOOL: 0
NAUSEA: 0
SHORTNESS OF BREATH: 0
WHEEZING: 0
COUGH: 0
VOMITING: 0
ABDOMINAL PAIN: 0
DIARRHEA: 0
ANAL BLEEDING: 0
CONSTIPATION: 0
CHEST TIGHTNESS: 0

## 2021-01-08 ASSESSMENT — VISUAL ACUITY: OU: 1

## 2021-01-25 ENCOUNTER — TELEPHONE (OUTPATIENT)
Dept: GASTROENTEROLOGY | Age: 51
End: 2021-01-25

## 2021-01-25 ENCOUNTER — VIRTUAL VISIT (OUTPATIENT)
Dept: PSYCHIATRY | Age: 51
End: 2021-01-25
Payer: MEDICARE

## 2021-01-25 DIAGNOSIS — F32.A DEPRESSION, UNSPECIFIED DEPRESSION TYPE: Primary | ICD-10-CM

## 2021-01-25 DIAGNOSIS — F41.1 GAD (GENERALIZED ANXIETY DISORDER): ICD-10-CM

## 2021-01-25 DIAGNOSIS — F10.21 ALCOHOL USE DISORDER, SEVERE, IN EARLY REMISSION (HCC): ICD-10-CM

## 2021-01-25 PROCEDURE — G8427 DOCREV CUR MEDS BY ELIG CLIN: HCPCS | Performed by: NURSE PRACTITIONER

## 2021-01-25 PROCEDURE — 3017F COLORECTAL CA SCREEN DOC REV: CPT | Performed by: NURSE PRACTITIONER

## 2021-01-25 PROCEDURE — 99213 OFFICE O/P EST LOW 20 MIN: CPT | Performed by: NURSE PRACTITIONER

## 2021-01-25 RX ORDER — MIRTAZAPINE 7.5 MG/1
7.5 TABLET, FILM COATED ORAL NIGHTLY PRN
Qty: 30 TABLET | Refills: 0 | Status: SHIPPED | OUTPATIENT
Start: 2021-01-25 | End: 2021-02-18

## 2021-01-25 RX ORDER — ACAMPROSATE CALCIUM 333 MG/1
666 TABLET, DELAYED RELEASE ORAL 3 TIMES DAILY
Qty: 180 TABLET | Refills: 0 | Status: SHIPPED | OUTPATIENT
Start: 2021-01-25 | End: 2021-02-22 | Stop reason: SDUPTHER

## 2021-01-25 NOTE — PROGRESS NOTES
Homicide Assessment: denies current homicidal ideation, plan and intent    History:      Review of Systems:  Constitutional: Negative. HENT: Negative. Eyes: Negative. Respiratory: Negative. Cardiovascular: Negative. Gastrointestinal: Negative. Genitourinary: Negative . Musculoskeletal: Negative. Skin: Negative. Neurological: Negative. Endo/Heme/Allergies: Negative. Current Outpatient Medications:     acamprosate (CAMPRAL) 333 MG tablet, Take 2 tablets by mouth 3 times daily, Disp: 180 tablet, Rfl: 0    mirtazapine (REMERON) 7.5 MG tablet, Take 1 tablet by mouth nightly as needed (sleep), Disp: 30 tablet, Rfl: 0    sertraline (ZOLOFT) 50 MG tablet, Take 1 tablet by mouth daily, Disp: 30 tablet, Rfl: 0    gabapentin (NEURONTIN) 300 MG capsule, Take 1 capsule by mouth 3 times daily for 180 days.  Intended supply: 30 days, Disp: 90 capsule, Rfl: 5    celecoxib (CELEBREX) 100 MG capsule, Take 1 capsule by mouth daily, Disp: 30 capsule, Rfl: 3    sodium chloride (ALTAMIST SPRAY) 0.65 % nasal spray, 1 spray by Nasal route as needed for Congestion, Disp: 1 Bottle, Rfl: 3    budesonide-formoterol (SYMBICORT) 160-4.5 MCG/ACT AERO, Inhale 2 puffs into the lungs 2 times daily, Disp: 3 Inhaler, Rfl: 1    tiotropium (SPIRIVA RESPIMAT) 1.25 MCG/ACT AERS inhaler, Inhale 2 puffs into the lungs daily, Disp: 1 Inhaler, Rfl: 3    tiZANidine (ZANAFLEX) 4 MG tablet, Take 1 tablet by mouth every 8 hours as needed (back pain), Disp: 30 tablet, Rfl: 3    potassium chloride (KLOR-CON M) 20 MEQ extended release tablet, Take 1 tablet by mouth daily, Disp: 30 tablet, Rfl: 5    magnesium carbonate (MAGONATE) 54 (Mag Equiv) MG/5ML LIQD oral liquid, Take 5 mLs by mouth 3 times daily (Patient not taking: Reported on 12/28/2020), Disp: 600 mL, Rfl: 3    ferrous sulfate (IRON 325) 325 (65 Fe) MG tablet, Take 1 tablet by mouth 2 times daily, Disp: 180 tablet, Rfl: 1 OARRS checked and there were no signs of substance abuse, or prescription misuse. Social History     Socioeconomic History    Marital status: Single     Spouse name: Not on file    Number of children: Not on file    Years of education: Not on file    Highest education level: Not on file   Occupational History    Not on file   Social Needs    Financial resource strain: Not on file    Food insecurity     Worry: Not on file     Inability: Not on file    Transportation needs     Medical: Not on file     Non-medical: Not on file   Tobacco Use    Smoking status: Current Every Day Smoker     Packs/day: 0.50     Years: 30.00     Pack years: 15.00     Types: Cigarettes    Smokeless tobacco: Never Used    Tobacco comment: plans on quitting in the future    Substance and Sexual Activity    Alcohol use: Not Currently     Alcohol/week: 0.0 standard drinks     Comment: every other day, pint of rum     Drug use: Not Currently     Types: Marijuana     Comment: occasional    Sexual activity: Not on file   Lifestyle    Physical activity     Days per week: Not on file     Minutes per session: Not on file    Stress: Not on file   Relationships    Social connections     Talks on phone: Not on file     Gets together: Not on file     Attends Sabianist service: Not on file     Active member of club or organization: Not on file     Attends meetings of clubs or organizations: Not on file     Relationship status: Not on file    Intimate partner violence     Fear of current or ex partner: Not on file     Emotionally abused: Not on file     Physically abused: Not on file     Forced sexual activity: Not on file   Other Topics Concern    Not on file   Social History Narrative    Not on file       TOBACCO: Sada Almaguer  reports that she has been smoking cigarettes. She has a 15.00 pack-year smoking history. She has never used smokeless tobacco.  ETOH: Sada Almaguer  reports previous alcohol use.     Past Medical History: Diagnosis Date    [facility]ism ([facility]     Anemia 10/2020    GI bleed    Astrocytoma ([facility] - 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24, the patient underwent 2 surgical resections without known recurrence 10/23/2020    Closed fracture of lateral portion of left tibial plateau 1/6/7267    COPD (chronic obstructive pulmonary disease) (HCC)     CO2 retainer, on Bipap at night for this, Dr. Tejinder Loco ( last visit 11/20/2020 and note on chart )    Depression     bipolar, major depressive disorder, ptsd, anxiety    GI bleed 10/2020    Hypertension     Oxygen dependent     pt stated not needed as of 12/9/2020    Pain, joint, ankle and foot     Pancreatic lesion 10/2020    Dr. Yan Zuniga working up pt    Seizures [facility])     also baseline tremors      Metabolic monitoring is being done by PCP. Family History   Problem Relation Age of Onset    Other Father     Cancer Maternal Grandmother     Heart Disease Paternal Grandmother     Esophageal Cancer Maternal Aunt      Last Labs:   Lab Results   Component Value Date    LABA1C 4.3 07/14/2019     Lab Results   Component Value Date    EAG 77 07/14/2019      Lab Results   Component Value Date    WBC 7.0 12/23/2020    HGB 13.4 12/23/2020    HCT 40.1 12/23/2020    MCV 96.1 12/23/2020     (H) 12/23/2020    LYMPHOPCT 37 12/23/2020    RBC 4.17 12/23/2020    MCH 32.0 12/23/2020    MCHC 33.3 12/23/2020    RDW 14.9 12/23/2020          Lab Results   Component Value Date     12/23/2020    K 4.7 12/23/2020     12/23/2020    CO2 27 12/23/2020    BUN 5 (L) 12/23/2020    CREATININE 0.54 12/23/2020    GLUCOSE 109 (H) 12/23/2020    CALCIUM 9.8 12/23/2020    PROT 7.2 12/23/2020    LABALBU 3.3 (L) 12/23/2020    BILITOT 0.17 (L) 12/23/2020    ALKPHOS 164 (H) 12/23/2020    AST 24 12/23/2020    ALT 7 12/23/2020    LABGLOM >60 12/23/2020    GFRAA >60 12/23/2020    GLOB NOT REPORTED 07/13/2019      . last    Diagnosis:      1.  Depression, unspecified depression type

## 2021-01-25 NOTE — TELEPHONE ENCOUNTER
Patient LVM asking if 1/29/21 was important and if she needed to come into the office. Patient is uncomfortable coming into the office due to [place]. Patient wondering if VV could be scheduled. Writer returned patients call and got her VM. Detailed message noted for patient to state the f/u appointment was to go over the EUS 12/9/20 procedure Dr. Igor Dan did and to go over the pathology and to answer any questions she might have, further treatment etc. Writer stated we can change her appointment to a VV. We can do it at the end of the day or Dr. Britton Granados does them Friday morning's. Advised patient to call [phone] opt 1 to let us know what works best for her and we can change that appointment.

## 2021-02-02 ENCOUNTER — OFFICE VISIT (OUTPATIENT)
Dept: NEUROLOGY | Age: 51
End: 2021-02-02
Payer: MEDICARE

## 2021-02-02 VITALS
SYSTOLIC BLOOD PRESSURE: 159 MMHG | WEIGHT: 115 LBS | DIASTOLIC BLOOD PRESSURE: 106 MMHG | HEART RATE: 81 BPM | BODY MASS INDEX: 19.14 KG/M2

## 2021-02-02 DIAGNOSIS — G62.9 PERIPHERAL POLYNEUROPATHY: Primary | ICD-10-CM

## 2021-02-02 PROCEDURE — 4004F PT TOBACCO SCREEN RCVD TLK: CPT | Performed by: STUDENT IN AN ORGANIZED HEALTH CARE EDUCATION/TRAINING PROGRAM

## 2021-02-02 PROCEDURE — 99215 OFFICE O/P EST HI 40 MIN: CPT | Performed by: STUDENT IN AN ORGANIZED HEALTH CARE EDUCATION/TRAINING PROGRAM

## 2021-02-02 PROCEDURE — G8484 FLU IMMUNIZE NO ADMIN: HCPCS | Performed by: STUDENT IN AN ORGANIZED HEALTH CARE EDUCATION/TRAINING PROGRAM

## 2021-02-02 PROCEDURE — G8420 CALC BMI NORM PARAMETERS: HCPCS | Performed by: STUDENT IN AN ORGANIZED HEALTH CARE EDUCATION/TRAINING PROGRAM

## 2021-02-02 PROCEDURE — 3017F COLORECTAL CA SCREEN DOC REV: CPT | Performed by: STUDENT IN AN ORGANIZED HEALTH CARE EDUCATION/TRAINING PROGRAM

## 2021-02-02 PROCEDURE — G8427 DOCREV CUR MEDS BY ELIG CLIN: HCPCS | Performed by: STUDENT IN AN ORGANIZED HEALTH CARE EDUCATION/TRAINING PROGRAM

## 2021-02-02 RX ORDER — PREGABALIN 50 MG/1
CAPSULE ORAL
Qty: 63 CAPSULE | Refills: 0 | Status: SHIPPED | OUTPATIENT
Start: 2021-02-02 | End: 2021-03-31 | Stop reason: ALTCHOICE

## 2021-02-02 RX ORDER — SERTRALINE HYDROCHLORIDE 100 MG/1
TABLET, FILM COATED ORAL
COMMUNITY
Start: 2020-11-18 | End: 2021-02-22 | Stop reason: DRUGHIGH

## 2021-02-02 RX ORDER — PREGABALIN 150 MG/1
150 CAPSULE ORAL 3 TIMES DAILY
Qty: 90 CAPSULE | Refills: 2 | Status: SHIPPED | OUTPATIENT
Start: 2021-02-16 | End: 2021-05-04

## 2021-02-02 RX ORDER — PYRIDOXINE HCL (VITAMIN B6) 50 MG
50 TABLET ORAL DAILY
Qty: 30 TABLET | Refills: 3 | Status: SHIPPED | OUTPATIENT
Start: 2021-02-02 | End: 2021-02-26

## 2021-02-02 ASSESSMENT — ENCOUNTER SYMPTOMS
EYE PAIN: 0
VOMITING: 0
PHOTOPHOBIA: 0
EYE REDNESS: 0
COUGH: 1
NAUSEA: 0
WHEEZING: 0
BACK PAIN: 1
CHEST TIGHTNESS: 0
SHORTNESS OF BREATH: 0

## 2021-02-02 NOTE — PATIENT INSTRUCTIONS
Start lyrica 1 tablet ( 50 mg) three times day for 1 week  Then 2 tablets (= 100 mg ) three times per day for 1 week  Then switch to (150 mg) three times a day    Wean down gabapentin   Take 2 tablets per day for 1 week  Then 1 tablet per day for 1 week   Then stop

## 2021-02-02 NOTE — PROGRESS NOTES
[address] [place] [address] [address] [facility]  Dept: [phone]  Dept Fax: [phone]    NEUROLOGY FOLLOW UP NOTE                                              PATIENT NAME: Valerio Jean-Baptiste   PATIENT MRN: [id number]  FOLLOW UP TODAY: 2/2/2021        INITIAL & INTERVAL HISTORY:     Valerio Jean-Baptiste is a 45 y.o. female for evaluation of peripheral neuropathy. Patient reports that she has been having pain and numbness for the last 6 month. Pain involves her lower extremity more often that her upper extremities. Pain is electrical like, however, she does get soreness when she walks for long distances which lasts for 1 to 2 days. She also reports that she hasn't been feeling her feet. Of notes, she has Hx of CRPS on left ankle since 2014 and she now feel the same symptoms on the right side. She has been on gabapentin 300 mg TID for the last month and half. It hasn't been helping. She has a previous diagnosis of restless legt syndrome and is on requip 1 mg HS for 10 months. She's on tegretol 200 mg TID for seizures. Last seizure was reported to be last September and she had 3 on a raw. She also feels occasional pain in her hands that she describes as electrical sensation. It affects     She has significant history of alcohol use but she has been sober for the last 60 days. PMH    has a past medical history of Alcoholism ([address] Anemia, 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24, the patient underwent 2 surgical resections without known recurrence, Closed fracture of lateral portion of left tibial plateau, COPD (chronic obstructive pulmonary disease) ([address] Depression, GI bleed, Hypertension, Oxygen dependent, Pain, joint, ankle and foot, Pancreatic lesion, and Seizures ([address] PSH/SH/FMH: Remain unchanged since last visit Visit date not found.     ALLERGIES:   No Known Allergies    MEDICATIONS:   Current Outpatient Medications   Medication Sig Dispense Refill    acamprosate (CAMPRAL) 333 MG tablet Take 2 tablets by mouth 3 times daily 180 tablet 0    mirtazapine (REMERON) 7.5 MG tablet Take 1 tablet by mouth nightly as needed (sleep) 30 tablet 0    sertraline (ZOLOFT) 50 MG tablet Take 1 tablet by mouth daily 30 tablet 0    gabapentin (NEURONTIN) 300 MG capsule Take 1 capsule by mouth 3 times daily for 180 days.  Intended supply: 30 days 90 capsule 5    celecoxib (CELEBREX) 100 MG capsule Take 1 capsule by mouth daily 30 capsule 3    sodium chloride (ALTAMIST SPRAY) 0.65 % nasal spray 1 spray by Nasal route as needed for Congestion 1 Bottle 3    budesonide-formoterol (SYMBICORT) 160-4.5 MCG/ACT AERO Inhale 2 puffs into the lungs 2 times daily 3 Inhaler 1    tiotropium (SPIRIVA RESPIMAT) 1.25 MCG/ACT AERS inhaler Inhale 2 puffs into the lungs daily 1 Inhaler 3    tiZANidine (ZANAFLEX) 4 MG tablet Take 1 tablet by mouth every 8 hours as needed (back pain) 30 tablet 3    potassium chloride (KLOR-CON M) 20 MEQ extended release tablet Take 1 tablet by mouth daily 30 tablet 5    magnesium carbonate (MAGONATE) 54 (Mag Equiv) MG/5ML LIQD oral liquid Take 5 mLs by mouth 3 times daily (Patient not taking: Reported on 12/28/2020) 600 mL 3    ferrous sulfate (IRON 325) 325 (65 Fe) MG tablet Take 1 tablet by mouth 2 times daily 180 tablet 1    rOPINIRole (REQUIP) 1 MG tablet Take 1 tablet by mouth nightly 90 tablet 1    ACETAMINOPHEN EXTRA STRENGTH 500 MG tablet Take 500 mg by mouth 3 times daily as needed      propranolol (INDERAL) 40 MG tablet Take 40 mg by mouth 2 times daily      albuterol sulfate HFA (VENTOLIN HFA) 108 (90 Base) MCG/ACT inhaler Inhale 2 puffs into the lungs 4 times daily as needed for Wheezing 1 Inhaler 5    busPIRone (BUSPAR) 15 MG tablet Take 15 mg by mouth every 6 hours 120 tablet 3    traZODone (DESYREL) 100 MG tablet Take 1 tablet by mouth nightly 30 tablet 3    carBAMazepine (TEGRETOL XR) 200 MG extended release tablet Take 1 tablet by mouth 3 times daily 90 tablet 3    amLODIPine (NORVASC) 5 MG tablet Take 1 tablet by mouth daily 90 tablet 1    hydrOXYzine (ATARAX) 50 MG tablet Take 1 tablet by mouth 3 times daily 90 tablet 3    vitamin B-1 (THIAMINE) 100 MG tablet Take 1 tablet by mouth daily 30 tablet 3    vitamin D (ERGOCALCIFEROL) 34277 units CAPS capsule Take 1 capsule by mouth once a week 12 capsule 1    folic acid (FOLVITE) 1 MG tablet Take 1 tablet by mouth daily 90 tablet 1     No current facility-administered medications for this visit. PREVIOUS WORKUP:          LABS & TESTS:      Lab Results   Component Value Date    WBC 7.0 12/23/2020    HGB 13.4 12/23/2020    HCT 40.1 12/23/2020    MCV 96.1 12/23/2020     (H) 12/23/2020       REVIEW OF SYSTEMS:     Review of Systems   HENT: Negative. Eyes: Positive for visual disturbance. Negative for photophobia, pain and redness. Respiratory: Positive for cough. Negative for chest tightness, shortness of breath and wheezing. Cardiovascular: Negative. Gastrointestinal: Negative for nausea and vomiting. Musculoskeletal: Positive for back pain. Neurological: Positive for dizziness, seizures, weakness, light-headedness, numbness and headaches. Negative for tremors, syncope, facial asymmetry and speech difficulty. Psychiatric/Behavioral: Positive for decreased concentration and sleep disturbance. VITALS  There were no vitals taken for this visit. PHYSICAL EXAMINATION:     Physical Exam  Vitals signs reviewed. Constitutional:       Appearance: Normal appearance. HENT:      Head: Normocephalic and atraumatic. Eyes:      Extraocular Movements: Extraocular movements intact. Pupils: Pupils are equal, round, and reactive to light. Neck:      Musculoskeletal: Normal range of motion and neck supple. Cardiovascular:      Rate and Rhythm: Normal rate and regular rhythm.    Pulmonary:      Effort: Pulmonary effort is normal. No respiratory distress. Breath sounds: Normal breath sounds. Abdominal:      General: Abdomen is flat. Neurological:      Mental Status: She is alert and oriented to person, place, and time. Deep Tendon Reflexes: Strength normal.      Reflex Scores:       Tricep reflexes are 2+ on the right side and 2+ on the left side. Bicep reflexes are 2+ on the right side and 2+ on the left side. Patellar reflexes are 2+ on the right side and 2+ on the left side. Achilles reflexes are 2+ on the right side and 2+ on the left side. Neurologic Exam     Mental Status   Oriented to person, place, and time. Cranial Nerves   Cranial nerves II through XII intact. CN III, IV, VI   Pupils are equal, round, and reactive to light. Motor Exam   Muscle bulk: normal  Overall muscle tone: normal  Right arm tone: normal  Left arm tone: normal  Right arm pronator drift: absent  Left arm pronator drift: absent  Right leg tone: normal  Left leg tone: normal    Strength   Strength 5/5 throughout.      Sensory Exam   Right arm light touch: normal  Left arm light touch: normal  Right leg light touch: decreased from knee  Left leg light touch: decreased from knee  Right arm pinprick: normal  Left arm pinprick: normal  Right leg pinprick: decreased from knee  Left leg pinprick: decreased from knee    Gait, Coordination, and Reflexes     Tremor   Resting tremor: absent  Intention tremor: absent    Reflexes   Right biceps: 2+  Left biceps: 2+  Right triceps: 2+  Left triceps: 2+  Right patellar: 2+  Left patellar: 2+  Right achilles: 2+  Left achilles: 2+  Right plantar: normal  Left plantar: normal  Right Freedman: absent  Left Freedman: absent  Right ankle clonus: absent  Left ankle clonus: absent  Right pendular knee jerk: absent  Left pendular knee jerk: absent        ASSESSMENT / PLAN:    Peripheral neuropathy, length dependent, symmetrical, alcohol related, r/o other causes Intermittent claudication     Vitamin B1, B6, E levels  SPEP, UPEP  ESR, CRP, cryoglobulins  Start vitamin B6,  50 mg daily   Start lyrica 50 mg TID for 1 week then 100 mg TID for 1 week then 150 mg TID  Wean down gabapentin   Can DC Requip. Will monitor for restless leg syndrome   Recommend ESE to r/o vasogenic claudication     Ms. Merline Jones received counseling on the following healthy behaviors: medical compliance, smoking cessation, blood pressure control, regular follow up with primary doctor. Electronically signed by Magdalena Tran MD on 2/2/2021 at 3:41 PM      This note is created with the assistance of a speech recognition program.  While intending to generate a document that actually reflects the content of the visit, the document can still have some errors including those of syntax and sound a like substitutions which may escape proof reading. In such instances, actual meaning can be extrapolated by contextual diversion.

## 2021-02-03 ENCOUNTER — HOSPITAL ENCOUNTER (OUTPATIENT)
Age: 51
Setting detail: SPECIMEN
Discharge: HOME | End: 2021-02-03
Payer: MEDICARE

## 2021-02-03 DIAGNOSIS — G62.9 PERIPHERAL POLYNEUROPATHY: ICD-10-CM

## 2021-02-03 LAB
C-REACTIVE PROTEIN: 12.1 MG/L (ref 0–5)
FOLATE: >20 NG/ML
SEDIMENTATION RATE, ERYTHROCYTE: 37 MM (ref 0–30)
VITAMIN B-12: 699 PG/ML (ref 232–1245)

## 2021-02-04 LAB
ALBUMIN (CALCULATED): 4 G/DL (ref 3.2–5.2)
ALBUMIN PERCENT: 57 % (ref 45–65)
ALPHA 1 PERCENT: 4 % (ref 3–6)
ALPHA 2 PERCENT: 13 % (ref 6–13)
ALPHA-1-GLOBULIN: 0.3 G/DL (ref 0.1–0.4)
ALPHA-2-GLOBULIN: 0.9 G/DL (ref 0.5–0.9)
BETA GLOBULIN: 0.9 G/DL (ref 0.5–1.1)
BETA PERCENT: 13 % (ref 11–19)
GAMMA GLOBULIN %: 13 % (ref 9–20)
GAMMA GLOBULIN: 0.9 G/DL (ref 0.5–1.5)
P E INTERPRETATION, U: NORMAL
PATHOLOGIST: NORMAL
PATHOLOGIST: NORMAL
PROTEIN ELECTROPHORESIS, SERUM: NORMAL
SPECIMEN TYPE: NORMAL
TOTAL PROT. SUM,%: 100 % (ref 98–102)
TOTAL PROT. SUM: 7 G/DL (ref 6.3–8.2)
TOTAL PROTEIN: 6.9 G/DL (ref 6.4–8.3)
URINE TOTAL PROTEIN: 51 MG/DL

## 2021-02-05 DIAGNOSIS — I10 ESSENTIAL HYPERTENSION: ICD-10-CM

## 2021-02-05 DIAGNOSIS — G40.909 SEIZURE DISORDER (HCC): ICD-10-CM

## 2021-02-07 LAB
ALPHA-TOCOPHEROL: 15.6 MG/L (ref 5.5–18)
GAMMA-TOCOPHEROL: 2.4 MG/L (ref 0–6)
VITAMIN B1 WHOLE BLOOD: 140 NMOL/L (ref 70–180)
VITAMIN B6: 13.6 NMOL/L (ref 20–125)

## 2021-02-08 ENCOUNTER — VIRTUAL VISIT (OUTPATIENT)
Dept: PSYCHOLOGY | Age: 51
End: 2021-02-08
Payer: MEDICARE

## 2021-02-08 DIAGNOSIS — F33.1 MAJOR DEPRESSIVE DISORDER, RECURRENT EPISODE, MODERATE WITH ANXIOUS DISTRESS (HCC): Primary | ICD-10-CM

## 2021-02-08 PROCEDURE — 4004F PT TOBACCO SCREEN RCVD TLK: CPT | Performed by: PSYCHOLOGIST

## 2021-02-08 PROCEDURE — 90791 PSYCH DIAGNOSTIC EVALUATION: CPT | Performed by: PSYCHOLOGIST

## 2021-02-08 RX ORDER — CARBAMAZEPINE 200 MG/1
TABLET, EXTENDED RELEASE ORAL
Qty: 90 TABLET | Refills: 2 | Status: SHIPPED | OUTPATIENT
Start: 2021-02-08 | End: 2021-05-20

## 2021-02-08 RX ORDER — AMLODIPINE BESYLATE 5 MG/1
TABLET ORAL
Qty: 90 TABLET | Refills: 0 | Status: SHIPPED | OUTPATIENT
Start: 2021-02-08 | End: 2021-05-06

## 2021-02-08 ASSESSMENT — ANXIETY QUESTIONNAIRES
5. BEING SO RESTLESS THAT IT IS HARD TO SIT STILL: 2-OVER HALF THE DAYS
4. TROUBLE RELAXING: 2-OVER HALF THE DAYS
1. FEELING NERVOUS, ANXIOUS, OR ON EDGE: 3-NEARLY EVERY DAY
2. NOT BEING ABLE TO STOP OR CONTROL WORRYING: 1-SEVERAL DAYS
3. WORRYING TOO MUCH ABOUT DIFFERENT THINGS: 3-NEARLY EVERY DAY
6. BECOMING EASILY ANNOYED OR IRRITABLE: 0-NOT AT ALL
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
## 2021-02-10 NOTE — PROGRESS NOTES
Ebonie Salas M.A. Psychology Doctoral Trainee    Supervising Clinical Psychologists:  Xavi Price, Ph.D. Zeeshan Sidhu,  Ph.D. Josette Palacio    Visit Date: 2/8/2021   Time of appointment:  1:00PM - 1:50PM   Time spent with Patient: 50 minutes. This is patient's first appointment. Reason for Consult:  Depression and Anxiety     Referring Provider/PCP:    No ref. provider found  Sachin Genao MD      Pt provided informed consent for the behavioral health program. Discussed with patient model of service to include the limits of confidentiality (i.e. abuse reporting, suicide intervention, etc.) and short-term intervention focused approach. Also discussed with patient that the service provider is a supervised clinician and in particular, is being supervised by Dr. Aaron Ortiz and/or Dr. Vaishnavi Meyer. Pt indicated understanding. Pt also provided verbal consent agreeing to be seen by a supervised clinician. Pt provided verbal consent to engage in telehealth psychotherapy. This visit was completed virtually using doxy. me due to contact restrictions related to the COVID-19 pandemic. Session was held in a private place (pt's home). Because of the virtual delivery method, certain behavioral observations were not assessed during this appointment. Pt provided verbal consent for the following emergency contact: Maribel Hernandez (mother) at [phone]. [facility]/Clinician location: Clinician's home in [place], [place].       PRESENTING PROBLEM AND HISTORY  Mala Sims is a 45 y.o. female who presents for new evaluation and treatment of anxiety, depression. She has the following symptoms: depressed mood, anhedonia, decreased sleep, fatigue/lack of energy, lack of motivation, low self-esteem, excessive worry about a number of events or activities , inability to stop or control worry, feeling afraid something awful might happen, feeling fidgety or restless, difficulty with attention/concentration and history of trauma. Onset of symptoms was approximately several years ago. Symptoms have been gradually worsening since that time, particularly the last few months as pt has sought treatment for alcohol use disorder. She denies current suicidal and homicidal ideation. Family history significant for anxiety and depression. Risk factors: negative life event (history of trauma, recent loss of employment) and previous episode of depression. Previous treatment includes \"many things\" (unspecified). She complains of the following medication side effects: none. Current coping: pt relies on social support and distraction. MENTAL STATUS EXAM  Mood was anxious and sad with tearful affect. Suicidal ideation was denied. Homicidal ideation was denied. Hygiene was good . Dress was appropriate. Behavior was Within Normal Limits with No observation of difficulties ambulating. Attitude was Cooperative and Help-seeking. Eye-contact was good. Speech: rate - WNL, rhythm -  WNL, volume - WNL  Verbalizations were goal directed and coherent. Thought processes were intact and goal-oriented without evidence of delusions, hallucinations, obsessions, or nathaniel; with little cognitive distortions. Associations were characterized by intact cognitive processes. Pt was oriented to person, place, time, and general circumstances;  recent:  good and remote:  good. Insight and judgment were estimated to be good, AEB, a good  understanding of cyclical maladaptive patterns, and the ability to use insight to inform behavior change.        CURRENT MEDICATIONS Current Outpatient Medications:     amLODIPine (NORVASC) 5 MG tablet, TAKE ONE TABLET BY MOUTH DAILY, Disp: 90 tablet, Rfl: 0    carBAMazepine (TEGRETOL XR) 200 MG extended release tablet, TAKE ONE TABLET BY MOUTH THREE TIMES A DAY, Disp: 90 tablet, Rfl: 2    sertraline (ZOLOFT) 100 MG tablet, , Disp: , Rfl:     pregabalin (LYRICA) 50 MG capsule, Take 1 capsule by mouth 3 times daily for 7 days, THEN 2 capsules 3 times daily for 7 days. , Disp: 63 capsule, Rfl: 0    pyridoxine (RA VITAMIN B-6) 50 MG tablet, Take 1 tablet by mouth daily, Disp: 30 tablet, Rfl: 3    [START ON 2/16/2021] pregabalin (LYRICA) 150 MG capsule, Take 1 capsule by mouth 3 times daily for 90 days. , Disp: 90 capsule, Rfl: 2    acamprosate (CAMPRAL) 333 MG tablet, Take 2 tablets by mouth 3 times daily, Disp: 180 tablet, Rfl: 0    mirtazapine (REMERON) 7.5 MG tablet, Take 1 tablet by mouth nightly as needed (sleep), Disp: 30 tablet, Rfl: 0    sertraline (ZOLOFT) 50 MG tablet, Take 1 tablet by mouth daily, Disp: 30 tablet, Rfl: 0    gabapentin (NEURONTIN) 300 MG capsule, Take 1 capsule by mouth 3 times daily for 180 days.  Intended supply: 30 days, Disp: 90 capsule, Rfl: 5    celecoxib (CELEBREX) 100 MG capsule, Take 1 capsule by mouth daily, Disp: 30 capsule, Rfl: 3    sodium chloride (ALTAMIST SPRAY) 0.65 % nasal spray, 1 spray by Nasal route as needed for Congestion, Disp: 1 Bottle, Rfl: 3    budesonide-formoterol (SYMBICORT) 160-4.5 MCG/ACT AERO, Inhale 2 puffs into the lungs 2 times daily, Disp: 3 Inhaler, Rfl: 1    tiotropium (SPIRIVA RESPIMAT) 1.25 MCG/ACT AERS inhaler, Inhale 2 puffs into the lungs daily, Disp: 1 Inhaler, Rfl: 3    tiZANidine (ZANAFLEX) 4 MG tablet, Take 1 tablet by mouth every 8 hours as needed (back pain), Disp: 30 tablet, Rfl: 3    potassium chloride (KLOR-CON M) 20 MEQ extended release tablet, Take 1 tablet by mouth daily, Disp: 30 tablet, Rfl: 5   magnesium carbonate (MAGONATE) 54 (Mag Equiv) MG/5ML LIQD oral liquid, Take 5 mLs by mouth 3 times daily (Patient not taking: Reported on 12/28/2020), Disp: 600 mL, Rfl: 3    ferrous sulfate (IRON 325) 325 (65 Fe) MG tablet, Take 1 tablet by mouth 2 times daily, Disp: 180 tablet, Rfl: 1    rOPINIRole (REQUIP) 1 MG tablet, Take 1 tablet by mouth nightly, Disp: 90 tablet, Rfl: 1    ACETAMINOPHEN EXTRA STRENGTH 500 MG tablet, Take 500 mg by mouth 3 times daily as needed, Disp: , Rfl:     propranolol (INDERAL) 40 MG tablet, Take 40 mg by mouth 2 times daily, Disp: , Rfl:     albuterol sulfate HFA (VENTOLIN HFA) 108 (90 Base) MCG/ACT inhaler, Inhale 2 puffs into the lungs 4 times daily as needed for Wheezing, Disp: 1 Inhaler, Rfl: 5    busPIRone (BUSPAR) 15 MG tablet, Take 15 mg by mouth every 6 hours, Disp: 120 tablet, Rfl: 3    traZODone (DESYREL) 100 MG tablet, Take 1 tablet by mouth nightly, Disp: 30 tablet, Rfl: 3    hydrOXYzine (ATARAX) 50 MG tablet, Take 1 tablet by mouth 3 times daily, Disp: 90 tablet, Rfl: 3    vitamin B-1 (THIAMINE) 100 MG tablet, Take 1 tablet by mouth daily, Disp: 30 tablet, Rfl: 3    vitamin D (ERGOCALCIFEROL) 46349 units CAPS capsule, Take 1 capsule by mouth once a week, Disp: 12 capsule, Rfl: 1    folic acid (FOLVITE) 1 MG tablet, Take 1 tablet by mouth daily, Disp: 90 tablet, Rfl: 1     FAMILY MEDICAL/MH HISTORY   Her family history includes Cancer in her maternal grandmother; Esophageal Cancer in her maternal aunt; Heart Disease in her paternal grandmother; Other in her father. Pt reported that her maternal grandmother had anxiety, and that her father had depression and narcissism. PATIENT MENTAL HEALTH HISTORY  Pt stated she has had depression since childhood. Pt noted that she experienced fleeting, passive suicidal ideation when she was in adolescence, and has not experienced it since. Pt also denied any current or previous history of non-suicidal self injury. Pt reported that she identifies as lesbian, describing that this was particularly difficult for her during childhood. Clinical symptom review was remarkable for symptoms of depression, generalized anxiety, social anxiety, history of trauma (occurred in childhood), and history of substance use difficulties. Pt reported she was diagnosed with \"extreme depression, extreme anxiety, and PTSD\" in 2020 at a rehab facility [facility], where she completed detox and counseling for alcohol abuse. Pt reports she has been sober for 60 days, and that her alcohol use worsened over the last 2-3 years before she sought treatment. PSYCHOSOCIAL HISTORY  Current living situation: Pt lives alone with her dog Tubize. 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 Support system: Pt reports a strong support system, noting close relationships with her mother, sister, and a few friends. Restoration/Spirituality: Pt stated she believes in the universe, and denied any other pertinent Catholic or spiritual beliefs. DRUG AND ALCOHOL CURRENT USE/HISTORY  TOBACCO:  She reports that she has been smoking cigarettes. She has a 15.00 pack-year smoking history. She has never used smokeless tobacco.  Pt smokes roughly 1 pack/day. ALCOHOL:  She reports previous alcohol use. OTHER SUBSTANCES: She reports previous drug use. Drug: Marijuana. Pt reported use for pain management.       ASSESSMENT Syd Stephenson presented to the appointment today for evaluation and treatment of symptoms of depression and anxiety, including anhedonia, low mood, fatigue and low energy, sleep difficulties, concentration difficulties, low self-worth, feelings of nervousness and dread, persistent worries about specific stressors, and restlessness. Pt currently has significant financial stressors due to unemployment. She is currently deemed no risk to herself or others. She meets criteria for Major Depressive Disorder, Recurrent, Current episode moderate, With anxious distress. She will benefit from continued medication evaluation to assess efficacy of current regimen in managing depressive and anxious symptoms. She will also benefit from continued case management and psychotherapy from her current established provider at [facility] (weekly sessions, began 4 weeks ago). Saloni Warren was in agreement with recommendations. PHQ Scores 2/5/2021 12/18/2018   PHQ2 Score 4 0   PHQ9 Score 13 0     Interpretation of Total Score Depression Severity: 1-4 = Minimal depression, 5-9 = Mild depression, 10-14 = Moderate depression, 15-19 = Moderately severe depression, 20-27 = Severe depression    How often pt has had thoughts of death or hurting self (if PHQ positive for depression):       AMAN 7 SCORE 2/8/2021   AMAN-7 Total Score 14     Interpretation of AMAN-7 score: 5-9 = mild anxiety, 10-14 = moderate anxiety, 15+ = severe anxiety. Recommend referral to behavioral health for scores 10 or greater. DIAGNOSIS  Saloni Warren was seen today for depression and anxiety.     Diagnoses and all orders for this visit:    Major depressive disorder, recurrent episode, moderate with anxious distress ([facility]      INTERVENTION Discussed self-care (sleep, nutrition, rewarding activities, social support, exercise), Discussed benefits of referral for specialty care, Established rapport, Conducted functional assessment, [place]-setting to identify pt's primary goals for [facility] visit / overall health and Supportive techniques, Collaborative treatment planning, Clarified role of [facility] in primary care      PLAN  1. Pt will continue working with Angy Rodríguez for medication management and therapist at [facility] for case management and behavioral health. 2. Pt will reach out for services as needed. INTERACTIVE COMPLEXITY  Is interactive complexity present?   No  Reason:  N/A  Additional Supporting Information:  N/A       Electronically signed by Christina Cutler on 2/10/21 at 5:27 PM EST

## 2021-02-11 DIAGNOSIS — K86.0 ALCOHOL-INDUCED CHRONIC PANCREATITIS (HCC): Primary | ICD-10-CM

## 2021-02-12 ENCOUNTER — HOSPITAL ENCOUNTER (OUTPATIENT)
Age: 51
Discharge: HOME | End: 2021-02-12
Payer: MEDICARE

## 2021-02-12 ENCOUNTER — TELEPHONE (OUTPATIENT)
Dept: GASTROENTEROLOGY | Age: 51
End: 2021-02-12

## 2021-02-12 ENCOUNTER — HOSPITAL ENCOUNTER (OUTPATIENT)
Dept: CT IMAGING | Age: 51
Discharge: HOME | End: 2021-02-14
Payer: MEDICARE

## 2021-02-12 ENCOUNTER — VIRTUAL VISIT (OUTPATIENT)
Dept: GASTROENTEROLOGY | Age: 51
End: 2021-02-12
Payer: MEDICARE

## 2021-02-12 DIAGNOSIS — K86.1 CHRONIC PANCREATITIS, UNSPECIFIED PANCREATITIS TYPE (HCC): Primary | ICD-10-CM

## 2021-02-12 DIAGNOSIS — K86.0 ALCOHOL-INDUCED CHRONIC PANCREATITIS (HCC): Primary | ICD-10-CM

## 2021-02-12 DIAGNOSIS — G62.9 PERIPHERAL POLYNEUROPATHY: ICD-10-CM

## 2021-02-12 DIAGNOSIS — K86.0 ALCOHOL-INDUCED CHRONIC PANCREATITIS (HCC): ICD-10-CM

## 2021-02-12 LAB
CREAT SERPL-MCNC: 0.47 MG/DL (ref 0.5–0.9)
GFR AFRICAN AMERICAN: >60 ML/MIN
GFR NON-AFRICAN AMERICAN: >60 ML/MIN
GFR SERPL CREATININE-BSD FRML MDRD: ABNORMAL ML/MIN/{1.73_M2}
GFR SERPL CREATININE-BSD FRML MDRD: ABNORMAL ML/MIN/{1.73_M2}

## 2021-02-12 PROCEDURE — 82565 ASSAY OF CREATININE: CPT

## 2021-02-12 PROCEDURE — 36415 COLL VENOUS BLD VENIPUNCTURE: CPT

## 2021-02-12 PROCEDURE — 6360000004 HC RX CONTRAST MEDICATION: Performed by: INTERNAL MEDICINE

## 2021-02-12 PROCEDURE — 82595 ASSAY OF CRYOGLOBULIN: CPT

## 2021-02-12 PROCEDURE — G8427 DOCREV CUR MEDS BY ELIG CLIN: HCPCS | Performed by: INTERNAL MEDICINE

## 2021-02-12 PROCEDURE — G8484 FLU IMMUNIZE NO ADMIN: HCPCS | Performed by: INTERNAL MEDICINE

## 2021-02-12 PROCEDURE — 74160 CT ABDOMEN W/CONTRAST: CPT

## 2021-02-12 PROCEDURE — G8420 CALC BMI NORM PARAMETERS: HCPCS | Performed by: INTERNAL MEDICINE

## 2021-02-12 PROCEDURE — 4004F PT TOBACCO SCREEN RCVD TLK: CPT | Performed by: INTERNAL MEDICINE

## 2021-02-12 PROCEDURE — 99442 PR PHYS/QHP TELEPHONE EVALUATION 11-20 MIN: CPT | Performed by: INTERNAL MEDICINE

## 2021-02-12 PROCEDURE — 3017F COLORECTAL CA SCREEN DOC REV: CPT | Performed by: INTERNAL MEDICINE

## 2021-02-12 RX ADMIN — IOPAMIDOL 75 ML: 755 INJECTION, SOLUTION INTRAVENOUS at 13:48

## 2021-02-12 ASSESSMENT — ENCOUNTER SYMPTOMS
BLOOD IN STOOL: 0
VOMITING: 0
EYES NEGATIVE: 1
DIARRHEA: 1
ABDOMINAL DISTENTION: 1
RECTAL PAIN: 0
RESPIRATORY NEGATIVE: 1
ABDOMINAL PAIN: 1
ANAL BLEEDING: 1
CONSTIPATION: 1
TROUBLE SWALLOWING: 1
NAUSEA: 1
ALLERGIC/IMMUNOLOGIC NEGATIVE: 1

## 2021-02-12 NOTE — TELEPHONE ENCOUNTER
Check out for VV 2/12/21. CT Scan and labs ordered. Writer called patient and she had labs drawn and CT is getting done. She wanted another virtual visit, scheduled for 3/5/21 11am.  Writer will print out an appt reminder and email to [email or web address] per patient request.

## 2021-02-12 NOTE — PROGRESS NOTES
VIRTUAL VISIT:  Jona Simeon is a 45 y.o. female evaluated via telephone on 2/12/2021. Consent:  She and/or health care decision maker is aware that that she may receive a bill for this telephone service, depending on her insurance coverage, and has provided verbal consent to proceed: Yes    Agree with ROS as documented and detailed by MA/LPN in separate note from today's encounter     Documentation:  I communicated with the patient and/or health care decision maker about follow up pancreatic lesion. Details of this discussion including any medical advice provided:     Hazel Null is a 45 y.o. 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     During the colonoscopy patient was identified to have a 7 mm flat polyp at approximately 25 cm and 8 mm size flat polyp identified at 20 cm not removed due to poor prep. She will likely require repeat colonoscopy once evaluated by colorectal surgery.     Patient had CT abdomen pelvis which demonstrated pancreatic head cyst (pseudocyst versus sidebranch IPMN measuring approximately 2.8 cm. Mild elevation in CA 19-9 was observed during the hospital course. No evidence of cirrhosis was seen on imaging however the patient did have hepatic steatosis    Recommendation:  S/p EUS by Dr. Evangelista Kc, may need repeat based on pending CT scan  FNA showing chronic pancreatitis, no dysplastic cells identifiedplan for repeat CT with pancreatic protocol to evaluate pancreatic head lesion. Follow up with Dr. Jaime Marquez for condyloma involving the anorectal/perineum. Follow up in 2 weeks. Repeat CT with pancreatic protocol   Patient continues to abstain from alcohol doing well from a GI standpoint    I affirm this is a Patient Initiated Episode with an Established Patient who has not had a related appointment within my department in the past 7 days or scheduled within the next 24 hours. Total Time: minutes: 11-20 minutes    Marimar Waite is a 45 y.o. female being evaluated by a Virtual Visit (telephone visit) encounter to address concerns as mentioned above. A caregiver was present when appropriate. Due to this being a TeleHealth encounter (During UXHDK-10 public health emergency), evaluation of the following organ systems was limited: Vitals/Constitutional/EENT/Resp/CV/GI//MS/Neuro/Skin/Heme-Lymph-Imm. Pursuant to the emergency declaration under the [facility], [address] [place] [address] and the [employer] Act, this Virtual Visit was conducted with patient's (and/or legal guardian's) consent, to reduce the patient's risk of exposure to COVID-19 and provide necessary medical care. The patient (and/or legal guardian) has also been advised to contact this office for worsening conditions or problems, and seek emergency medical treatment and/or call 911 if deemed necessary. Services were provided through a telephone synchronous discussion virtually to substitute for in-person clinic visit. Patient and provider were located at their individual homes. --Reginaldo Bullock MD on 2/12/2021 at 10:54 AM    An electronic signature was used to authenticate this note.        [address] [facility]

## 2021-02-12 NOTE — PROGRESS NOTES
Review of Systems   Constitutional: Positive for appetite change and fatigue. Negative for unexpected weight change. HENT: Positive for trouble swallowing (saliva). Eyes: Negative. Respiratory: Negative. Cardiovascular: Negative. Gastrointestinal: Positive for abdominal distention, abdominal pain, anal bleeding (hemorrhoids), constipation, diarrhea and nausea. Negative for blood in stool, rectal pain and vomiting. Endocrine: Negative. Genitourinary: Negative. Musculoskeletal: Positive for gait problem. Skin: Negative. Allergic/Immunologic: Negative. Hematological: Negative. Psychiatric/Behavioral: Positive for sleep disturbance. All other systems reviewed and are negative.

## 2021-02-17 DIAGNOSIS — F32.A DEPRESSION, UNSPECIFIED DEPRESSION TYPE: Primary | ICD-10-CM

## 2021-02-17 LAB — CRYOGLOBULIN: 0 MG/DL (ref 0–10)

## 2021-02-18 RX ORDER — MIRTAZAPINE 7.5 MG/1
TABLET, FILM COATED ORAL
Qty: 30 TABLET | Refills: 0 | Status: SHIPPED | OUTPATIENT
Start: 2021-02-18 | End: 2021-02-22

## 2021-02-18 NOTE — TELEPHONE ENCOUNTER
.Please Approve or Refuse.   Send to Pharmacy per Pt's Request:      Next Visit Date:  2/22/2021   Last Visit Date: 1/25/2021 n/a

## 2021-02-22 ENCOUNTER — TELEPHONE (OUTPATIENT)
Dept: NEUROLOGY | Age: 51
End: 2021-02-22

## 2021-02-22 ENCOUNTER — TELEMEDICINE (OUTPATIENT)
Dept: PSYCHIATRY | Age: 51
End: 2021-02-22
Payer: MEDICARE

## 2021-02-22 DIAGNOSIS — F41.1 GAD (GENERALIZED ANXIETY DISORDER): ICD-10-CM

## 2021-02-22 DIAGNOSIS — F32.A DEPRESSION, UNSPECIFIED DEPRESSION TYPE: Primary | ICD-10-CM

## 2021-02-22 DIAGNOSIS — F10.21 ALCOHOL USE DISORDER, SEVERE, IN EARLY REMISSION (HCC): ICD-10-CM

## 2021-02-22 PROCEDURE — 3017F COLORECTAL CA SCREEN DOC REV: CPT | Performed by: NURSE PRACTITIONER

## 2021-02-22 PROCEDURE — 99213 OFFICE O/P EST LOW 20 MIN: CPT | Performed by: NURSE PRACTITIONER

## 2021-02-22 PROCEDURE — G8427 DOCREV CUR MEDS BY ELIG CLIN: HCPCS | Performed by: NURSE PRACTITIONER

## 2021-02-22 RX ORDER — ACAMPROSATE CALCIUM 333 MG/1
666 TABLET, DELAYED RELEASE ORAL 3 TIMES DAILY
Qty: 180 TABLET | Refills: 0 | Status: SHIPPED | OUTPATIENT
Start: 2021-02-22 | End: 2021-03-22 | Stop reason: SDUPTHER

## 2021-02-22 RX ORDER — SERTRALINE HYDROCHLORIDE 100 MG/1
100 TABLET, FILM COATED ORAL DAILY
Qty: 30 TABLET | Refills: 0 | Status: SHIPPED | OUTPATIENT
Start: 2021-02-22 | End: 2021-03-09

## 2021-02-22 RX ORDER — MIRTAZAPINE 7.5 MG/1
7.5 TABLET, FILM COATED ORAL NIGHTLY PRN
Qty: 30 TABLET | Refills: 0 | Status: SHIPPED
Start: 2021-02-22 | End: 2021-03-22

## 2021-02-22 NOTE — TELEPHONE ENCOUNTER
Labs were reviewed. Patient has low vitamin B6 level which can be one of the reasons for her symptoms. Vitamin B6 supplement was ordered to her [employer] Please inform the patient.      Thanks
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
## 2021-02-26 ENCOUNTER — TELEMEDICINE (OUTPATIENT)
Dept: PULMONOLOGY | Age: 51
End: 2021-02-26
Payer: MEDICARE

## 2021-02-26 DIAGNOSIS — J44.0 CHRONIC OBSTRUCTIVE PULMONARY DISEASE WITH ACUTE LOWER RESPIRATORY INFECTION (HCC): Primary | ICD-10-CM

## 2021-02-26 PROCEDURE — 99214 OFFICE O/P EST MOD 30 MIN: CPT | Performed by: INTERNAL MEDICINE

## 2021-02-26 PROCEDURE — G8427 DOCREV CUR MEDS BY ELIG CLIN: HCPCS | Performed by: INTERNAL MEDICINE

## 2021-02-26 PROCEDURE — 3017F COLORECTAL CA SCREEN DOC REV: CPT | Performed by: INTERNAL MEDICINE

## 2021-02-26 RX ORDER — PYRIDOXINE HCL (VITAMIN B6) 50 MG
100 TABLET ORAL DAILY
Qty: 180 TABLET | Refills: 1 | Status: SHIPPED | OUTPATIENT
Start: 2021-02-26 | End: 2021-05-04

## 2021-02-26 RX ORDER — BUDESONIDE AND FORMOTEROL FUMARATE DIHYDRATE 160; 4.5 UG/1; UG/1
2 AEROSOL RESPIRATORY (INHALATION) 2 TIMES DAILY
Qty: 3 INHALER | Refills: 1 | Status: SHIPPED
Start: 2021-02-26 | End: 2021-03-31 | Stop reason: SDUPTHER

## 2021-02-26 ASSESSMENT — SLEEP AND FATIGUE QUESTIONNAIRES
HOW LIKELY ARE YOU TO NOD OFF OR FALL ASLEEP WHILE SITTING INACTIVE IN A PUBLIC PLACE: 0
ESS TOTAL SCORE: 0
HOW LIKELY ARE YOU TO NOD OFF OR FALL ASLEEP WHILE LYING DOWN TO REST IN THE AFTERNOON WHEN CIRCUMSTANCES PERMIT: 0
HOW LIKELY ARE YOU TO NOD OFF OR FALL ASLEEP WHILE SITTING AND TALKING TO SOMEONE: 0

## 2021-02-26 NOTE — PROGRESS NOTES
Trinh Galan  2/26/2021    Chief Complaint   Patient presents with    Follow-up     has questions on inhalers    Chronic respiratory failure  COPD  Smoking  Alcohol abuse  She is known to have chronic obstructive lung disease due to chronic bronchitis and emphysema causing chronic respiratory failure. At one time she was on home oxygen which she has been able to stop successfully. She was also using CPAP but she is not using it anymore. She was a CO2 retainer. There are no clinical features of CO2 retention such as headache and more headache during the night or during the morning. No confusion in the morning. Pulmonary status stable the no evidence of acute exacerbation. No excessive sputum production no hemoptysis no chest pain no excessive wheezing or cough. She used to drink alcohol heavily but she has given that up. She is also given up smoking she is waiting to have the had flu vaccine. Her blood pressure has been under good good control with present therapy. No angina no. PND. Review of Systems -unchanged  General ROS: negative for - chills, fatigue, fever or weight loss  ENT ROS: negative for - headaches, oral lesions or sore throat  Cardiovascular ROS: no chest pain , orthopnea or pnd   Gastrointestinal ROS: no abdominal pain, change in bowel habits, or black or bloody stools  Skin - no rash   Neuro - no blurry vision , no loc . No focal weakness   msk - no jt tenderness or swelling    Vascular - no claudication , rest completed and negative   Lymphatic - complete and negative   Hematology - oncology - complete and negative   Allergy immunology - complete and negative    no burning or hematuria       LUNG CANCER SCREENING     1. CRITERIA MET    []     CT ORDERED  []      2. CRITERIA NOT MET   [x]      3. REFUSED                    []        REASON CRITERIA NOT MET     1. SMOKING LESS THAN 30 PY  []      2. AGE LESS THAN 55 or GREATER 77 YEARS  []      3.  QUIT SMOKING 15 YEARS OR GREATER   []      4. RECENT CT WITH IN 11 MONTHS    []      5. LIFE EXPECTANCY < 5 YEARS   []      6.  SIGNS  AND SYMPTOMS OF LUNG CANCER   []           Immunization   Immunization History   Administered Date(s) Administered    Influenza Vaccine, unspecified formulation 10/18/2018    Influenza Virus Vaccine 11/01/2017, 10/18/2018, 09/10/2019    Influenza, Quiana Sabins, IM, (6 mo and older Fluzone, Flulaval, Fluarix and 3 yrs and older Afluria) 09/10/2019    MMR 09/27/2006    Pneumococcal Polysaccharide ([id number]) 12/28/2020    Tdap (Boostrix, Adacel) 12/28/2020        Pneumococcal Vaccine     [] Up to date    [x] Indicated   [] Refused  [] Contraindicated       Influenza Vaccine   [] Up to date    [x] Indicated   [] Refused  [] Contraindicated       PAST MEDICAL HISTORY:         Diagnosis Date    [facility]     Anemia 10/2020    GI bleed    Astrocytoma ([facility] [address] - 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24, the patient underwent 2 surgical resections without known recurrence 10/23/2020    Closed fracture of lateral portion of left tibial plateau 2/0/2895    COPD (chronic obstructive pulmonary disease) ([facility] [address] [facility]     CO2 retainer, on Bipap at night for this, Dr. Leonardo Simons ( last visit 11/20/2020 and note on chart )    Depression     bipolar, major depressive disorder, ptsd, anxiety    GI bleed 10/2020    Hypertension     Oxygen dependent     pt stated not needed as of 12/9/2020    Pain, joint, ankle and foot     Pancreatic lesion 10/2020    Dr. Fady Berumen working up pt    Seizures ([facility] [address]     also baseline tremors       Family History:       Problem Relation Age of Onset    Other Father     Cancer Maternal Grandmother     Heart Disease Paternal Grandmother     Esophageal Cancer Maternal Aunt        SURGICAL HISTORY:   Past Surgical History:   Procedure Laterality Date    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    COLONOSCOPY N/A 10/22/2020    COLONOSCOPY DIAGNOSTIC performed by Venkatesh Lucas MD at  [address] ENDOSCOPIC ULTRASOUND (LOWER) N/A 12/9/2020    ENDOSCOPIC ULTRASOUND, UPPER WITH LINEAR SCOPE FOR BIOPSY OF MASS ON HEAD OF PANCREAS performed by Rosibel Leblanc MD at [address] Left 7-3-13    ORIF tibial plateau    FRACTURE SURGERY Right     small finger metacarpal fracture    HYSTERECTOMY  2003    UPPER GASTROINTESTINAL ENDOSCOPY N/A 10/22/2020    EGD BIOPSY performed by Edwin Esteban MD at [facility]              Not in a hospital admission. No Known Allergies  Social History     Tobacco Use   Smoking Status Current Every Day Smoker    Packs/day: 0.50    Years: 30.00    Pack years: 15.00    Types: Cigarettes   Smokeless Tobacco Never Used   Tobacco Comment    plans on quitting in the future      Prior to Admission medications    Medication Sig Start Date End Date Taking? Authorizing Provider   pyridoxine (RA VITAMIN B-6) 50 MG tablet Take 2 tablets by mouth daily 2/26/21 8/25/21 Yes Stacia Tam MD   acamprosate (CAMPRAL) 333 MG tablet Take 2 tablets by mouth 3 times daily 2/22/21 3/24/21 Yes LOI Villavicencio NP   mirtazapine (REMERON) 7.5 MG tablet Take 1 tablet by mouth nightly as needed (sleep) 2/22/21  Yes LOI Villavicencio NP   sertraline (ZOLOFT) 100 MG tablet Take 1 tablet by mouth daily 2/22/21  Yes LOI Villavicencio NP   amLODIPine (NORVASC) 5 MG tablet TAKE ONE TABLET BY MOUTH DAILY 2/8/21  Yes Ludivina Cardenas Cera, MD   carBAMazepine (TEGRETOL XR) 200 MG extended release tablet TAKE ONE TABLET BY MOUTH THREE TIMES A DAY 2/8/21  Yes Ludivina Cardenas Cera, MD   pregabalin (LYRICA) 150 MG capsule Take 1 capsule by mouth 3 times daily for 90 days.  2/16/21 5/17/21 Yes Stacia Tam MD   celecoxib (CELEBREX) 100 MG capsule Take 1 capsule by mouth daily 12/28/20  Yes Ludivina Cardenas Cera, MD   sodium chloride (ALTAMIST SPRAY) 0.65 % nasal spray 1 spray by Nasal route as needed for Congestion 12/28/20  Yes Ludivina Cardenas Cera, MD   budesonide-formoterol Dustin Sabillon MD   rOPINIRole (REQUIP) 1 MG tablet Take 1 tablet by mouth nightly  Patient not taking: Reported on 2/26/2021 7/8/20   Ludivina Dustin Sabillon MD   propranolol (INDERAL) 40 MG tablet Take 40 mg by mouth 2 times daily 5/12/20   Historical Provider, MD         Physical Exam  General Appearance:    Alert, cooperative, no distress, appears stated age] no distress pink puffer   Head:    Normocephalic, without obvious abnormality, atraumatic   Eye examination normal no Malik syndrome  Nose examination normal no polyps    Throat examination unremarkable    Ear examination normal               :    Neck:   Supple, symmetrical, trachea midline, no adenopathy;     thyroid:  no enlargement/tenderness/nodules; no carotid    bruit or JVD   Back:     Symmetric, no curvature, ROM normal, no CVA tenderness   Lungs:      AP diameter is increased percussion note is hyperresonant expiration prolonged no rales rhonchi are audible or friction rub is audible   Chest Wall:    No tenderness or deformity      Heart:    Regular rate and rhythm, S1 and S2 normal, no murmur, rub        or gallop no rvh                           Abdomen:                                                 Pulses:                                            Lymph nodes:                    Neurologic:                  Soft, non-tender, bowel sounds active all four quadrants,     no masses, no organomegaly         2+ and symmetric all extremities            Cervical, supraclavicular not enlarged or matted or tender      CNII-XII intact, normal strength 5/5 . Sensation grossly normal  and reflexes normal 2+  throughout     Clubbing No  Lower ext edema No1+   [] , 2 +  [] , 3+   []  Upper ext edema No       Musculoskeletal - no joint swelling or tenderness or synovitis               There were no vitals taken for this visit.     CXR  Consistent with COPD      CT Scans  No recent CT    Echo  Echo was done in the hospital.        Assessment  COPD  Chronic respiratory failure  Hypertension  History of smoking  History of alcohol abuse  Plan:  Doing well was advised to continue the same treatment as before her prescription for Spiriva and Symbicort were refilled. She will continue Spiriva short acting beta agonist as before    He was encouraged to stay away from smoking    She was encouraged to stay away from alcohol    Her blood pressure is under good control    She was advised to get the Covid vaccine. She is off oxygen at home now. She is not using any CPAP anymore. Patient has lost some weight. There is no evidence of an acute exacerbation of COPD. Electronically signed by Kandis Jacinto MD on   11/20/20 at 11:10 AM EST  Please note that this chart was generated using voice recognition Dragon dictation software. Although every effort was made to ensure the accuracy of this automated transcription, some errors in transcription may have occurred.

## 2021-03-05 ENCOUNTER — VIRTUAL VISIT (OUTPATIENT)
Dept: GASTROENTEROLOGY | Age: 51
End: 2021-03-05
Payer: MEDICARE

## 2021-03-05 ENCOUNTER — TELEPHONE (OUTPATIENT)
Dept: GASTROENTEROLOGY | Age: 51
End: 2021-03-05

## 2021-03-05 DIAGNOSIS — R13.19 ESOPHAGEAL DYSPHAGIA: Primary | ICD-10-CM

## 2021-03-05 PROCEDURE — G8420 CALC BMI NORM PARAMETERS: HCPCS | Performed by: INTERNAL MEDICINE

## 2021-03-05 PROCEDURE — 99212 OFFICE O/P EST SF 10 MIN: CPT | Performed by: INTERNAL MEDICINE

## 2021-03-05 PROCEDURE — 4004F PT TOBACCO SCREEN RCVD TLK: CPT | Performed by: INTERNAL MEDICINE

## 2021-03-05 PROCEDURE — G8427 DOCREV CUR MEDS BY ELIG CLIN: HCPCS | Performed by: INTERNAL MEDICINE

## 2021-03-05 PROCEDURE — G8484 FLU IMMUNIZE NO ADMIN: HCPCS | Performed by: INTERNAL MEDICINE

## 2021-03-05 PROCEDURE — 3017F COLORECTAL CA SCREEN DOC REV: CPT | Performed by: INTERNAL MEDICINE

## 2021-03-05 RX ORDER — OMEPRAZOLE 20 MG/1
20 CAPSULE, DELAYED RELEASE ORAL 2 TIMES DAILY
Qty: 60 CAPSULE | Refills: 3 | Status: SHIPPED
Start: 2021-03-05 | End: 2021-05-04 | Stop reason: DRUGHIGH

## 2021-03-05 ASSESSMENT — ENCOUNTER SYMPTOMS
BLOOD IN STOOL: 0
ALLERGIC/IMMUNOLOGIC NEGATIVE: 1
RESPIRATORY NEGATIVE: 1
TROUBLE SWALLOWING: 1
EYES NEGATIVE: 1
ABDOMINAL DISTENTION: 1
RECTAL PAIN: 0
VOMITING: 0

## 2021-03-05 NOTE — PROGRESS NOTES
Review of Systems   Constitutional: Positive for fatigue. Negative for unexpected weight change. HENT: Positive for trouble swallowing (saliva). Eyes: Negative. Respiratory: Negative. Cardiovascular: Negative. Gastrointestinal: Positive for abdominal distention. Negative for blood in stool, rectal pain and vomiting. Anal bleeding: hemorrhoids. Endocrine: Negative. Genitourinary: Negative. Musculoskeletal: Positive for gait problem. Skin: Negative. Allergic/Immunologic: Negative. Hematological: Negative. Psychiatric/Behavioral: Positive for sleep disturbance. All other systems reviewed and are negative.

## 2021-03-07 NOTE — PLAN OF CARE
PROVIDE ADEQUATE OXYGENATION WITH ACCEPTABLE SP02/ABG'S    [x]  IDENTIFY APPROPRIATE OXYGEN THERAPY  [x]   MONITOR SP02/ABG'S AS NEEDED   [x]   PATIENT EDUCATION AS NEEDED    BRONCHOSPASM/BRONCHOCONSTRICTION     [x]         IMPROVE AERATION/BREATH SOUNDS  [x]   ADMINISTER BRONCHODILATOR THERAPY AS APPROPRIATE  [x]   ASSESS BREATH SOUNDS  []   IMPLEMENT AEROSOL/MDI PROTOCOL  [x]   PATIENT EDUCATION AS NEEDED    JAGJIT WilkesPPatient Assessment complete. COPD exacerbation ([facility] [address] [J44.1] . Vitals:    10/15/20 2354   BP: 122/83   Pulse: 79   Resp: 18   Temp: 98.4 °F (36.9 °C)   SpO2: 100%   . Patients home meds are   Prior to Admission medications    Medication Sig Start Date End Date Taking?  Authorizing Provider   tiotropium (SPIRIVA RESPIMAT) 1.25 MCG/ACT AERS inhaler Inhale 2 puffs into the lungs daily 10/12/20   Ludivina Tavarez MD   tiZANidine (ZANAFLEX) 4 MG tablet Take 1 tablet by mouth every 8 hours as needed (back pain) 10/12/20   Ludivina Tavarez MD   potassium chloride (KLOR-CON M) 20 MEQ extended release tablet Take 1 tablet by mouth daily 9/10/20   LOI Alvarado CNP   magnesium carbonate (MAGONATE) 54 (Mag Equiv) MG/5ML LIQD oral liquid Take 5 mLs by mouth 3 times daily 7/16/20   Ludivina Tavarez MD   ferrous sulfate (IRON 325) 325 (65 Fe) MG tablet Take 1 tablet by mouth 2 times daily 7/16/20   Ludivina Tavarez MD   rOPINIRole (REQUIP) 1 MG tablet Take 1 tablet by mouth nightly 7/8/20   Ludivina Tavarez MD   ACETAMINOPHEN EXTRA STRENGTH 500 MG tablet Take 500 mg by mouth 3 times daily as needed 5/8/20   Historical Provider, MD   ibuprofen (ADVIL;MOTRIN) 800 MG tablet Take 800 mg by mouth 3 times daily as needed 5/15/20   Historical Provider, MD   propranolol (INDERAL) 40 MG tablet Take 40 mg by mouth 2 times daily 5/12/20   Historical Provider, MD   albuterol sulfate HFA (VENTOLIN HFA) 108 (90 Base) MCG/ACT inhaler Inhale 2 puffs into the lungs 4 times daily as needed for than 25   Peak flow % of Pred or PB [x]  NA   []  Greater than 90%  []  81-90% []  71-80% []  Less than or equal to 70%  or unable to perform []  Unable due to Respiratory Distress   Dyspnea re []  Patient Baseline []  No SOB []  No SOB []  SOB on exertion [x]  SOB min activity []  At rest/acute   e FEV% Predicted       [x]  NA []  Above 69%  []  Unable []  Above 60-69%  []  Unable []  Above 50-59%  []  Unable []  Above 35-49%  []  Unable []  Less than 35%  []  Unable          No change to pt current respiratory therapy treatment plan at this time. stated

## 2021-03-08 DIAGNOSIS — F41.9 ANXIETY: ICD-10-CM

## 2021-03-08 DIAGNOSIS — F32.9 MAJOR DEPRESSIVE DISORDER, REMISSION STATUS UNSPECIFIED, UNSPECIFIED WHETHER RECURRENT: Primary | ICD-10-CM

## 2021-03-08 DIAGNOSIS — I10 ESSENTIAL HYPERTENSION: ICD-10-CM
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
## 2021-03-08 NOTE — TELEPHONE ENCOUNTER
.Please Approve or Refuse.   Send to Pharmacy per Pt's Request:      Next Visit Date:  3/22/2021   Last Visit Date: 2/22/2021

## 2021-03-09 ENCOUNTER — TELEPHONE (OUTPATIENT)
Dept: GASTROENTEROLOGY | Age: 51
End: 2021-03-09

## 2021-03-09 RX ORDER — BUSPIRONE HYDROCHLORIDE 15 MG/1
TABLET ORAL
Qty: 120 TABLET | Refills: 3 | Status: SHIPPED
Start: 2021-03-09 | End: 2021-03-31 | Stop reason: ALTCHOICE

## 2021-03-09 RX ORDER — SERTRALINE HYDROCHLORIDE 100 MG/1
TABLET, FILM COATED ORAL
Qty: 30 TABLET | Refills: 0 | Status: SHIPPED | OUTPATIENT
Start: 2021-03-09 | End: 2021-03-22

## 2021-03-09 RX ORDER — AMLODIPINE BESYLATE 5 MG/1
TABLET ORAL
Qty: 90 TABLET | Refills: 0 | OUTPATIENT
Start: 2021-03-09

## 2021-03-12 ENCOUNTER — HOSPITAL ENCOUNTER (OUTPATIENT)
Age: 51
Setting detail: SPECIMEN
Discharge: HOME | End: 2021-03-12
Payer: MEDICARE

## 2021-03-12 ENCOUNTER — HOSPITAL ENCOUNTER (OUTPATIENT)
Dept: LAB | Age: 51
Setting detail: SPECIMEN
Discharge: HOME | End: 2021-03-12
Payer: MEDICARE

## 2021-03-12 DIAGNOSIS — Z01.818 PREOP TESTING: ICD-10-CM

## 2021-03-12 DIAGNOSIS — F41.9 ANXIETY: ICD-10-CM

## 2021-03-12 DIAGNOSIS — Z01.818 PREOP TESTING: Primary | ICD-10-CM

## 2021-03-12 RX ORDER — HYDROXYZINE 50 MG/1
TABLET, FILM COATED ORAL
Qty: 90 TABLET | Refills: 3 | Status: ON HOLD
Start: 2021-03-12 | End: 2021-08-03 | Stop reason: HOSPADM
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
## 2021-03-13 LAB
SARS-COV-2: ABNORMAL
SARS-COV-2: ABNORMAL
SOURCE: ABNORMAL

## 2021-03-15 ENCOUNTER — TELEMEDICINE (OUTPATIENT)
Dept: PSYCHOLOGY | Age: 51
End: 2021-03-15
Payer: MEDICARE

## 2021-03-15 DIAGNOSIS — F33.1 MAJOR DEPRESSIVE DISORDER, RECURRENT EPISODE, MODERATE WITH ANXIOUS DISTRESS (HCC): Primary | ICD-10-CM

## 2021-03-15 PROCEDURE — 90832 PSYTX W PT 30 MINUTES: CPT | Performed by: PSYCHOLOGIST

## 2021-03-15 PROCEDURE — 4004F PT TOBACCO SCREEN RCVD TLK: CPT | Performed by: PSYCHOLOGIST

## 2021-03-16 ENCOUNTER — TELEPHONE (OUTPATIENT)
Dept: GASTROENTEROLOGY | Age: 51
End: 2021-03-16

## 2021-03-16 ENCOUNTER — TELEPHONE (OUTPATIENT)
Dept: FAMILY MEDICINE CLINIC | Age: 51
End: 2021-03-16

## 2021-03-16 ENCOUNTER — HOSPITAL ENCOUNTER (OUTPATIENT)
Dept: GENERAL RADIOLOGY | Age: 51
Discharge: HOME | End: 2021-03-18
Payer: MEDICARE

## 2021-03-16 DIAGNOSIS — R13.19 ESOPHAGEAL DYSPHAGIA: ICD-10-CM

## 2021-03-16 DIAGNOSIS — R13.10 DYSPHAGIA, UNSPECIFIED TYPE: Primary | ICD-10-CM

## 2021-03-16 PROCEDURE — 74220 X-RAY XM ESOPHAGUS 1CNTRST: CPT

## 2021-03-16 PROCEDURE — 2500000003 HC RX 250 WO HCPCS: Performed by: INTERNAL MEDICINE

## 2021-03-16 RX ADMIN — BARIUM SULFATE 140 ML: 980 POWDER, FOR SUSPENSION ORAL at 13:39

## 2021-03-16 RX ADMIN — BARIUM SULFATE 160 ML: 960 POWDER, FOR SUSPENSION ORAL at 13:39

## 2021-03-16 NOTE — TELEPHONE ENCOUNTER
Results called to office by Luis gomes. Message sent to Dr Trinh Aultman nurse. Waiting of confirmation of message being received.

## 2021-03-16 NOTE — TELEPHONE ENCOUNTER
Patient called stating she had her covid test and was told it came back Indeterminate. Patient would like to know what to do next.

## 2021-03-16 NOTE — TELEPHONE ENCOUNTER
Mid-esophageal narrowing on esophogram. Needs diagnostic EGD. Discussed findings with patient, will follow and schedule procedure accordingly. All questions answered during encounter. Patient was appreciative of phone call and agreed to plan outlined. She thanked me at the end of the encounter.

## 2021-03-17 NOTE — TELEPHONE ENCOUNTER
Spoke with patient and she said this was part of her pre-op testing. States they did do the procedure and has talked to the doctor who did the procedure.  She stated the doctor told her she did not have to do another covid test.

## 2021-03-17 NOTE — TELEPHONE ENCOUNTER
Writer called patient, scheduled for EGD 4/5/21 8:30am at [facility]. No covid testing needed per [facility] surg  Rose Seo per [facility] guidelines. P.A. T. appt scheduled for 3/29/21 at 1pm at [facility]. Writer went over dates and times with patient and she voiced understanding.   Will also send through Shaser and email [email or web address].

## 2021-03-18 NOTE — PROGRESS NOTES
Kev Marc M.A. Psychology Doctoral Trainee    Supervising Clinical Psychologists:  Josse Harris, Ph.D. Candice Malhotra,  Ph.D. Liza Jaime [phone]        Visit Date: 3/15/2021   Time of appointment:  3:54PM - 4:21PM   Time spent with Patient: 27 minutes. This is patient's second appointment. Reason for Consult:  Depression and Anxiety     Referring Provider/PCP:    No ref. provider found  Phong River MD      Pt provided informed consent for the behavioral health program. Discussed with patient model of service to include the limits of confidentiality (i.e. abuse reporting, suicide intervention, etc.) and short-term intervention focused approach. Pt indicated understanding. Pt provided verbal consent to engage in telehealth psychotherapy. This visit was completed virtually using doxy. me due to contact restrictions related to the COVID-19 pandemic. Session was held in a private place (pt's home). Because of the virtual delivery method, certain behavioral observations were not assessed during this appointment.      Pt provided verbal consent for the following emergency contact: Mariela Cheng (mother) at [phone].     [facility]/Clinician location: Clinician's home in [place], [place]. Vibha Lee is a 45 y.o. female who presents for follow up of depression and anxiety symptoms, including low mood, anhedonia, fatigue and low energy, sleep difficulties, concentration difficulties, low self-worth, feelings of nervousness and dread, persistent worries about specific stressors, and restlessness. Session activities included discussion of self-care and treatment goals. Pt presented for treatment after learning that her provider at [facility] was limited to case management. Pt noted an increase in pain-related impairment this week, describing that her mood has been negatively impacted.   Brief psychoeducation on pain was provided, with recommendation to continue follow-ups with medical providers. Pt's self-care was assessed; enjoyable and doable activities were brainstormed, including coloring, listening to music, practicing gratitude, and cooking. Previous Recommendations:   1. Pt will continue working with Richardson Arriola for medication management and therapist at [facility] for case management and behavioral health. 2. Pt will reach out for services as needed. MENTAL STATUS EXAM  Mood was sad with congruent affect. Suicidal ideation was denied. Homicidal ideation was denied. Hygiene was good . Dress was appropriate. Behavior was Within Normal Limits with No observation of difficulties ambulating. Attitude was Engageable, Friendly and Help-seeking. Eye-contact was good. Speech: rate - WNL, rhythm - WNL, volume - WNL. Verbalizations were goal directed and coherent. Thought processes were intact and goal-oriented without evidence of delusions, hallucinations, obsessions, or nathaniel; with moderate cognitive distortions. Associations were characterized by intact cognitive processes. Pt was oriented to person, place, time, and general circumstances;  recent:  good and remote:  good. Insight and judgment were estimated to be fair, AEB, a fair  understanding of cyclical maladaptive patterns, and the ability to use insight to inform behavior change. ASSESSMENT  Jamie Hillman presented to the appointment today for f/u of symptoms of depression and anxiety, including anhedonia, low mood, fatigue and low energy, sleep difficulties, concentration difficulties, low self-worth, feelings of nervousness and dread, persistent worry about specific stressors, and restlessness. Pt is currrently in the process of establishing social security disability, and has significant financial stressors due to unemployment. She is currently deemed low risk to herself or others.   She meets criteria for Major Depressive Disorder, Recurrent, Current episode moderate, with anxious distress. Pt's goals for treatment include processing challenging stressors/historical situations and improving her self-care and coping strategies. Tx approach will include emotion processing and self-care emphasis, and possibly stress management. Mala Sims was in agreement with recommendations. PHQ Scores 2/5/2021 12/18/2018   PHQ2 Score 4 0   PHQ9 Score 13 0     Interpretation of Total Score Depression Severity: 1-4 = Minimal depression, 5-9 = Mild depression, 10-14 = Moderate depression, 15-19 = Moderately severe depression, 20-27 = Severe depression    How often pt has had thoughts of death or hurting self (if PHQ positive for depression):       AMAN 7 SCORE 2/8/2021   AMAN-7 Total Score 14     Interpretation of AMAN-7 score: 5-9 = mild anxiety, 10-14 = moderate anxiety, 15+ = severe anxiety. Recommend referral to behavioral health for scores 10 or greater. DIAGNOSIS  Mala Sims was seen today for depression and anxiety. Diagnoses and all orders for this visit:    Major depressive disorder, recurrent episode, moderate with anxious distress ([facility] [address]      INTERVENTION  Discussed and set plan for behavioral activation, Discussed self-care (sleep, nutrition, rewarding activities, social support, exercise), Established rapport, [place]-setting to identify pt's primary goals for [facility] visit / overall health, Supportive techniques, Emphasized self-care as important for managing overall health and Provided Psychoeducation re: cognitive-behavioral treatments for chronic pain, clarified treatment goals      PLAN  1. Pt will engage in enjoyable activities (color, listen to music, practice gratitude, cook). 2. Next session in 2 weeks: will review and continue activity scheduling, continue processing health changes and stress, clarify treatment goals      INTERACTIVE COMPLEXITY  Is interactive complexity present?   No  Reason:  N/A  Additional Supporting Information:  N/A       Electronically signed by Elijah Luna

## 2021-03-22 ENCOUNTER — TELEMEDICINE (OUTPATIENT)
Dept: PSYCHIATRY | Age: 51
End: 2021-03-22
Payer: MEDICARE

## 2021-03-22 DIAGNOSIS — F41.1 GAD (GENERALIZED ANXIETY DISORDER): ICD-10-CM

## 2021-03-22 DIAGNOSIS — F10.21 ALCOHOL USE DISORDER, SEVERE, IN EARLY REMISSION (HCC): ICD-10-CM

## 2021-03-22 DIAGNOSIS — F32.9 MAJOR DEPRESSIVE DISORDER, REMISSION STATUS UNSPECIFIED, UNSPECIFIED WHETHER RECURRENT: Primary | ICD-10-CM

## 2021-03-22 PROCEDURE — 99214 OFFICE O/P EST MOD 30 MIN: CPT | Performed by: NURSE PRACTITIONER

## 2021-03-22 RX ORDER — SERTRALINE HYDROCHLORIDE 100 MG/1
150 TABLET, FILM COATED ORAL DAILY
Qty: 45 TABLET | Refills: 0 | Status: SHIPPED | OUTPATIENT
Start: 2021-03-22 | End: 2021-04-12

## 2021-03-22 RX ORDER — TRAZODONE HYDROCHLORIDE 50 MG/1
25 TABLET ORAL NIGHTLY PRN
Qty: 15 TABLET | Refills: 0 | Status: SHIPPED | OUTPATIENT
Start: 2021-03-22 | End: 2021-04-19

## 2021-03-22 RX ORDER — ACAMPROSATE CALCIUM 333 MG/1
666 TABLET, DELAYED RELEASE ORAL 3 TIMES DAILY
Qty: 180 TABLET | Refills: 0 | Status: SHIPPED | OUTPATIENT
Start: 2021-03-22 | End: 2021-04-19 | Stop reason: SDUPTHER
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
## 2021-03-22 NOTE — PATIENT INSTRUCTIONS
Stop nighttime dose of buspirone. Stop taking remeron,   Start using trazodone at night as needed for sleep. Increase sertraline dose to 150mg, 1.5 tablets daily.

## 2021-03-29 ENCOUNTER — TELEMEDICINE (OUTPATIENT)
Dept: PSYCHOLOGY | Age: 51
End: 2021-03-29
Payer: MEDICARE

## 2021-03-29 DIAGNOSIS — F33.1 MAJOR DEPRESSIVE DISORDER, RECURRENT EPISODE, MODERATE WITH ANXIOUS DISTRESS (HCC): Primary | ICD-10-CM

## 2021-03-29 PROCEDURE — 4004F PT TOBACCO SCREEN RCVD TLK: CPT | Performed by: PSYCHOLOGIST

## 2021-03-29 PROCEDURE — 90832 PSYTX W PT 30 MINUTES: CPT | Performed by: PSYCHOLOGIST

## 2021-03-29 NOTE — PROGRESS NOTES
Britton Ramos M.A. Psychology Doctoral Trainee    Supervising Clinical Psychologists:  Mono Becerra, Ph.D. Aliyah Carvalho,  Ph.D. Skinny Farrell        Visit Date: 3/29/2021   Time of appointment:  3:06PM - 3:30PM   Time spent with Patient: 24 minutes. This is patient's third appointment. Reason for Consult:  Depression and Anxiety     Referring Provider/PCP:    No ref. provider found  Lisa Dash MD      Pt provided informed consent for the behavioral health program. Discussed with patient model of service to include the limits of confidentiality (i.e. abuse reporting, suicide intervention, etc.) and short-term intervention focused approach. Pt indicated understanding. Pt provided verbal consent to engage in telehealth psychotherapy. This visit was completed virtually using doxy. me due to contact restrictions related to the COVID-19 pandemic. Session was held in a private place (pt's home). Because of the virtual delivery method, certain behavioral observations were not assessed during this appointment.      Pt provided verbal consent for the following emergency contact: Darling Mcclendon (mother) at [phone].     [facility]/Clinician location: [facility] in [place], [place]. Adrian Figueroa is a 45 y.o. female who presents for follow up of depression and anxiety symptoms, including low mood, anhedonia, fatigue and low energy, sleep difficulties, concentration difficulties, low self-worth, feelings of nervousness and dread, persistent worries about specific stressors, and restlessness. Session activities included discussion of activity scheduling, behavioral activation, and treatment plan. Pt's current stressors were processed and validated. Framework for behavioral activation was presented, and activity scheduling was reviewed and augmented (e.g., pt's interest in podcasts and learning) for the upcoming week.   Pt's goals for treatment were clarified, including managing depression and processing current stressors as well as challenging experiences from adolescence/young-adulthood. Previous Recommendations:   1. Pt will engage in enjoyable activities (color, listen to music, practice gratitude, cook). 2. Next session in 2 weeks: will review and continue activity scheduling, continue processing health changes and stress, clarify treatment goals      MENTAL STATUS EXAM  Mood was sad with congruent affect. Suicidal ideation was denied. Homicidal ideation was denied. Hygiene was not assessed. Dress was not assessed. Behavior was not assessed with No observation of difficulties ambulating. Attitude was Engageable, Friendly and Help-seeking. Eye-contact was not assessed. Speech: rate - WNL, rhythm - WNL, volume - WNL. Verbalizations were goal directed and coherent. Thought processes were intact and goal-oriented without evidence of delusions, hallucinations, obsessions, or nathaniel; with moderate cognitive distortions. Associations were characterized by intact cognitive processes. Pt was oriented to person, place, time, and general circumstances;  recent:  good and remote:  good. Insight and judgment were estimated to be fair, AEB, a fair  understanding of cyclical maladaptive patterns, and the ability to use insight to inform behavior change. ASSESSMENT  Owens Boas presented to the appointment today for f/u of symptoms of depression and anxiety, including anhedonia, low mood, fatigue and low energy, sleep difficulties, concentration difficulties, low self-worth, feelings of nervousness and dread, persistent worry about specific stressors, and restlessness. Pt is currrently in the process of establishing social security disability, and has significant financial stressors due to unemployment. She is currently deemed low risk to herself or others.   She meets criteria for Major Depressive Disorder, Recurrent, Current episode moderate, with anxious distress. Pt's goals for treatment include processing challenging stressors/historical situations and improving her self-care and coping strategies. Tx approach will include emotion processing and self-care emphasis, and possibly stress management. [facility] was in agreement with recommendations. PHQ Scores 2/5/2021 12/18/2018   PHQ2 Score 4 0   PHQ9 Score 13 0     Interpretation of Total Score Depression Severity: 1-4 = Minimal depression, 5-9 = Mild depression, 10-14 = Moderate depression, 15-19 = Moderately severe depression, 20-27 = Severe depression    How often pt has had thoughts of death or hurting self (if PHQ positive for depression):       AMAN 7 SCORE 2/8/2021   AMAN-7 Total Score 14     Interpretation of AMAN-7 score: 5-9 = mild anxiety, 10-14 = moderate anxiety, 15+ = severe anxiety. Recommend referral to behavioral health for scores 10 or greater. DIAGNOSIS  [facility] was seen today for depression and anxiety. Diagnoses and all orders for this visit:    Major depressive disorder, recurrent episode, moderate with anxious distress ([address]      INTERVENTION  Discussed and set plan for behavioral activation, Discussed self-care (sleep, nutrition, rewarding activities, social support, exercise), Established rapport, [place]-setting to identify pt's primary goals for [facility] visit / overall health, Supportive techniques, Emphasized self-care as important for managing overall health and Provided Psychoeducation re: cognitive-behavioral treatments for chronic pain, clarified treatment goals      PLAN  1. Pt will continue to engage in enjoyable activities (color, listen to podcasts and music, practice gratitude, cook). 2. Next session in 1.5 weeks: will review and continue activity scheduling, continue processing health changes, begin discussing other challenges from growing up      [address]  Is interactive complexity present?   No  Reason:  N/A  Additional Supporting Information:  N/A       Electronically signed by Dejah Antunez on 3/18/21 at 9:10 AM [email or web address]

## 2021-03-31 ENCOUNTER — OFFICE VISIT (OUTPATIENT)
Dept: FAMILY MEDICINE CLINIC | Age: 51
End: 2021-03-31
Payer: MEDICARE

## 2021-03-31 VITALS
OXYGEN SATURATION: 98 % | WEIGHT: 133.6 LBS | DIASTOLIC BLOOD PRESSURE: 78 MMHG | BODY MASS INDEX: 22.23 KG/M2 | SYSTOLIC BLOOD PRESSURE: 116 MMHG | TEMPERATURE: 97.3 F | HEART RATE: 86 BPM

## 2021-03-31 DIAGNOSIS — M54.6 ACUTE RIGHT-SIDED THORACIC BACK PAIN: ICD-10-CM

## 2021-03-31 DIAGNOSIS — I10 ESSENTIAL HYPERTENSION: ICD-10-CM

## 2021-03-31 DIAGNOSIS — R56.9 SEIZURES (HCC): ICD-10-CM

## 2021-03-31 DIAGNOSIS — Z72.0 TOBACCO USE: ICD-10-CM

## 2021-03-31 DIAGNOSIS — C71.9 ASTROCYTOMA (HCC): ICD-10-CM

## 2021-03-31 DIAGNOSIS — F33.1 MODERATE EPISODE OF RECURRENT MAJOR DEPRESSIVE DISORDER (HCC): ICD-10-CM

## 2021-03-31 DIAGNOSIS — J44.9 CHRONIC OBSTRUCTIVE PULMONARY DISEASE, UNSPECIFIED COPD TYPE (HCC): ICD-10-CM

## 2021-03-31 DIAGNOSIS — E72.20 DISORDER OF UREA CYCLE METABOLISM, UNSPECIFIED (HCC): ICD-10-CM

## 2021-03-31 DIAGNOSIS — F33.2 MAJOR DEPRESSIVE DISORDER, RECURRENT SEVERE WITHOUT PSYCHOTIC FEATURES (HCC): ICD-10-CM

## 2021-03-31 DIAGNOSIS — G62.1 ALCOHOLIC PERIPHERAL NEUROPATHY (HCC): ICD-10-CM

## 2021-03-31 DIAGNOSIS — F10.21 ALCOHOL USE DISORDER, SEVERE, IN EARLY REMISSION (HCC): Primary | ICD-10-CM

## 2021-03-31 PROBLEM — J44.1 COPD EXACERBATION (HCC): Status: RESOLVED | Noted: 2020-10-12 | Resolved: 2021-03-31

## 2021-03-31 PROBLEM — N39.0 URINARY TRACT INFECTION WITHOUT HEMATURIA: Status: RESOLVED | Noted: 2019-07-14 | Resolved: 2021-03-31

## 2021-03-31 PROBLEM — F10.932 ALCOHOL WITHDRAWAL HALLUCINOSIS (HCC): Status: RESOLVED | Noted: 2019-07-13 | Resolved: 2021-03-31

## 2021-03-31 PROBLEM — J96.01 ACUTE RESPIRATORY FAILURE WITH HYPOXIA (HCC): Status: RESOLVED | Noted: 2020-10-16 | Resolved: 2021-03-31
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
## 2021-03-31 PROBLEM — F10.930 ALCOHOL WITHDRAWAL SYNDROME WITHOUT COMPLICATION (HCC): Status: RESOLVED | Noted: 2020-10-13 | Resolved: 2021-03-31

## 2021-03-31 PROBLEM — R07.9 ACUTE CHEST PAIN: Status: RESOLVED | Noted: 2018-12-15 | Resolved: 2021-03-31

## 2021-03-31 PROBLEM — F10.931 ALCOHOL WITHDRAWAL SYNDROME, WITH DELIRIUM (HCC): Status: RESOLVED | Noted: 2019-12-14 | Resolved: 2021-03-31

## 2021-03-31 PROBLEM — R55 SYNCOPE AND COLLAPSE: Status: RESOLVED | Noted: 2019-12-15 | Resolved: 2021-03-31

## 2021-03-31 PROCEDURE — G8484 FLU IMMUNIZE NO ADMIN: HCPCS | Performed by: INTERNAL MEDICINE

## 2021-03-31 PROCEDURE — 3023F SPIROM DOC REV: CPT | Performed by: INTERNAL MEDICINE

## 2021-03-31 PROCEDURE — 99214 OFFICE O/P EST MOD 30 MIN: CPT | Performed by: INTERNAL MEDICINE

## 2021-03-31 PROCEDURE — G8427 DOCREV CUR MEDS BY ELIG CLIN: HCPCS | Performed by: INTERNAL MEDICINE

## 2021-03-31 PROCEDURE — G8926 SPIRO NO PERF OR DOC: HCPCS | Performed by: INTERNAL MEDICINE

## 2021-03-31 PROCEDURE — G8420 CALC BMI NORM PARAMETERS: HCPCS | Performed by: INTERNAL MEDICINE

## 2021-03-31 PROCEDURE — 4004F PT TOBACCO SCREEN RCVD TLK: CPT | Performed by: INTERNAL MEDICINE

## 2021-03-31 PROCEDURE — 3017F COLORECTAL CA SCREEN DOC REV: CPT | Performed by: INTERNAL MEDICINE

## 2021-03-31 RX ORDER — TIZANIDINE 4 MG/1
4 TABLET ORAL EVERY 8 HOURS PRN
Qty: 30 TABLET | Refills: 3 | Status: SHIPPED | OUTPATIENT
Start: 2021-03-31 | End: 2021-07-01

## 2021-03-31 RX ORDER — BUDESONIDE AND FORMOTEROL FUMARATE DIHYDRATE 160; 4.5 UG/1; UG/1
2 AEROSOL RESPIRATORY (INHALATION) 2 TIMES DAILY
Qty: 3 INHALER | Refills: 1 | Status: SHIPPED
Start: 2021-03-31 | End: 2022-01-05 | Stop reason: ALTCHOICE
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
## 2021-04-01 ENCOUNTER — TELEPHONE (OUTPATIENT)
Dept: PSYCHIATRY | Age: 51
End: 2021-04-01

## 2021-04-01 RX ORDER — ROPINIROLE 1 MG/1
1 TABLET, FILM COATED ORAL NIGHTLY
Qty: 90 TABLET | Refills: 1 | Status: SHIPPED | OUTPATIENT
Start: 2021-04-01 | End: 2022-01-04

## 2021-04-01 RX ORDER — ROPINIROLE 1 MG/1
1 TABLET, FILM COATED ORAL NIGHTLY
Qty: 90 TABLET | Refills: 1 | Status: CANCELLED | OUTPATIENT
Start: 2021-04-01

## 2021-04-01 RX ORDER — PROPRANOLOL HYDROCHLORIDE 40 MG/1
40 TABLET ORAL 2 TIMES DAILY
Qty: 90 TABLET | Refills: 1 | OUTPATIENT
Start: 2021-04-01

## 2021-04-01 NOTE — TELEPHONE ENCOUNTER
Pt states has not taken the propranolol in a while and is not really sure who started her on it. She states used for anxiety in the past and is hoping will help her.     Last visit: 03/31/2021  Last Med refill: unknown due to order date  Does patient have enough medication for 72 hours: No:     Next Visit Date:  Future Appointments   Date Time Provider Lexi Anna   4/6/2021  3:30 PM Cleve Hanley MD Neuro [facility]   4/8/2021  3:00 PM [address]   4/19/2021  3:00 PM [facility]   4/23/2021 11:30 AM Rick Lujan MD [facility]   8/2/2021  1:00 PM Ludivina Garcia  [address] Maintenance   Topic Date Due    Cervical cancer screen  Never done    Shingles Vaccine (1 of 2) Never done    COVID-19 Vaccine (2 - Pfizer 2-dose series) 04/16/2021    Breast cancer screen  11/06/2021    Potassium monitoring  12/23/2021    Creatinine monitoring  02/12/2022    Lipid screen  12/23/2025    Colon cancer screen colonoscopy  10/22/2030    DTaP/Tdap/Td vaccine (2 - Td) 12/28/2030    Flu vaccine  Completed    Pneumococcal 0-64 years Vaccine  Completed    Hepatitis C screen  Completed    HIV screen  Completed    Hepatitis A vaccine  Aged Out    Hepatitis B vaccine  Aged Out    Hib vaccine  Aged Out    Meningococcal (ACWY) vaccine  Aged Out       Hemoglobin A1C (%)   Date Value   07/14/2019 4.3             ( goal A1C is < 7)   No results found for: LABMICR  LDL Cholesterol (mg/dL)   Date Value   12/23/2020 134 (H)   06/18/2019 92       (goal LDL is <100)   AST (U/L)   Date Value   12/23/2020 24     ALT (U/L)   Date Value   12/23/2020 7     BUN (mg/dL)   Date Value   12/23/2020 5 (L)     BP Readings from Last 3 Encounters:   03/31/21 116/78   02/02/21 (!) 159/106   12/28/20 120/80          (goal 120/80)    All Future Testing planned in [facility]  Lab Frequency Next Occurrence   CBC With Auto Differential Once 11/02/2020

## 2021-04-01 NOTE — TELEPHONE ENCOUNTER
Very hesitant to start propranolol back again for her, it is going to interfere with her breathing and also drop her blood pressure too much since she is already taking amlodipine for it. She should talk to her psychiatrist regarding whether the propranolol would be a good idea to help her anxiety.

## 2021-04-01 NOTE — TELEPHONE ENCOUNTER
Writer called patient to remind her of her procedure for Monday 4/5/21 at [facility] and to arrive at 7am at [facility]. Writer reviewed egd prep with patient and she voiced understanding.

## 2021-04-02 ENCOUNTER — TELEPHONE (OUTPATIENT)
Dept: GASTROENTEROLOGY | Age: 51
End: 2021-04-02

## 2021-04-05 ENCOUNTER — ANESTHESIA EVENT (OUTPATIENT)
Dept: ENDOSCOPY | Age: 51
End: 2021-04-05
Payer: MEDICARE

## 2021-04-05 ENCOUNTER — HOSPITAL ENCOUNTER (OUTPATIENT)
Age: 51
Setting detail: OUTPATIENT SURGERY
Discharge: HOME | End: 2021-04-05
Attending: INTERNAL MEDICINE | Admitting: INTERNAL MEDICINE
Payer: MEDICARE

## 2021-04-05 ENCOUNTER — ANESTHESIA (OUTPATIENT)
Dept: ENDOSCOPY | Age: 51
End: 2021-04-05
Payer: MEDICARE

## 2021-04-05 VITALS
OXYGEN SATURATION: 94 % | DIASTOLIC BLOOD PRESSURE: 90 MMHG | HEART RATE: 85 BPM | SYSTOLIC BLOOD PRESSURE: 143 MMHG | BODY MASS INDEX: 19.99 KG/M2 | WEIGHT: 120 LBS | TEMPERATURE: 98.3 F | RESPIRATION RATE: 23 BRPM | HEIGHT: 65 IN

## 2021-04-05 VITALS
SYSTOLIC BLOOD PRESSURE: 104 MMHG | OXYGEN SATURATION: 99 % | RESPIRATION RATE: 16 BRPM | DIASTOLIC BLOOD PRESSURE: 67 MMHG

## 2021-04-05 LAB
SARS-COV-2, RAPID: NOT DETECTED
SPECIMEN DESCRIPTION: NORMAL

## 2021-04-05 PROCEDURE — 2500000003 HC RX 250 WO HCPCS: Performed by: NURSE ANESTHETIST, CERTIFIED REGISTERED

## 2021-04-05 PROCEDURE — 87635 SARS-COV-2 COVID-19 AMP PRB: CPT

## 2021-04-05 PROCEDURE — 3700000001 HC ADD 15 MINUTES (ANESTHESIA): Performed by: INTERNAL MEDICINE

## 2021-04-05 PROCEDURE — 88305 TISSUE EXAM BY PATHOLOGIST: CPT

## 2021-04-05 PROCEDURE — 2580000003 HC RX 258: Performed by: INTERNAL MEDICINE

## 2021-04-05 PROCEDURE — 88312 SPECIAL STAINS GROUP 1: CPT

## 2021-04-05 PROCEDURE — 7100000011 HC PHASE II RECOVERY - ADDTL 15 MIN: Performed by: INTERNAL MEDICINE

## 2021-04-05 PROCEDURE — 2709999900 HC NON-CHARGEABLE SUPPLY: Performed by: INTERNAL MEDICINE

## 2021-04-05 PROCEDURE — 7100000010 HC PHASE II RECOVERY - FIRST 15 MIN: Performed by: INTERNAL MEDICINE

## 2021-04-05 PROCEDURE — 3700000000 HC ANESTHESIA ATTENDED CARE: Performed by: INTERNAL MEDICINE

## 2021-04-05 PROCEDURE — 6360000002 HC RX W HCPCS: Performed by: NURSE ANESTHETIST, CERTIFIED REGISTERED

## 2021-04-05 PROCEDURE — 3609012400 HC EGD TRANSORAL BIOPSY SINGLE/MULTIPLE: Performed by: INTERNAL MEDICINE

## 2021-04-05 PROCEDURE — 43239 EGD BIOPSY SINGLE/MULTIPLE: CPT | Performed by: INTERNAL MEDICINE

## 2021-04-05 RX ORDER — LIDOCAINE HYDROCHLORIDE 10 MG/ML
INJECTION, SOLUTION EPIDURAL; INFILTRATION; INTRACAUDAL; PERINEURAL PRN
Status: DISCONTINUED | OUTPATIENT
Start: 2021-04-05 | End: 2021-04-05 | Stop reason: SDUPTHER

## 2021-04-05 RX ORDER — PROPOFOL 10 MG/ML
INJECTION, EMULSION INTRAVENOUS PRN
Status: DISCONTINUED | OUTPATIENT
Start: 2021-04-05 | End: 2021-04-05 | Stop reason: SDUPTHER

## 2021-04-05 RX ORDER — SODIUM CHLORIDE 9 MG/ML
INJECTION, SOLUTION INTRAVENOUS ONCE
Status: COMPLETED | OUTPATIENT
Start: 2021-04-05 | End: 2021-04-05

## 2021-04-05 RX ADMIN — PROPOFOL 50 MG: 10 INJECTION, EMULSION INTRAVENOUS at 10:08

## 2021-04-05 RX ADMIN — PROPOFOL 50 MG: 10 INJECTION, EMULSION INTRAVENOUS at 10:13

## 2021-04-05 RX ADMIN — PROPOFOL 100 MG: 10 INJECTION, EMULSION INTRAVENOUS at 10:03

## 2021-04-05 RX ADMIN — SODIUM CHLORIDE: 9 INJECTION, SOLUTION INTRAVENOUS at 07:55

## 2021-04-05 RX ADMIN — LIDOCAINE HYDROCHLORIDE 25 MG: 10 INJECTION, SOLUTION EPIDURAL; INFILTRATION; INTRACAUDAL; PERINEURAL at 10:03

## 2021-04-05 RX ADMIN — SODIUM CHLORIDE: 9 INJECTION, SOLUTION INTRAVENOUS at 07:50

## 2021-04-05 ASSESSMENT — COPD QUESTIONNAIRES: CAT_SEVERITY: MODERATE

## 2021-04-05 NOTE — ANESTHESIA PRE PROCEDURE
Department of Anesthesiology  Preprocedure Note       Name:  Ni Mccann   Age:  45 y.o.  :  1970                                          MRN:  [id number]         Date:  2021      Surgeon: Ok Floor):  Magui Parson MD    Procedure: Procedure(s):  EGD ESOPHAGOGASTRODUODENOSCOPY    Medications prior to admission:   Prior to Admission medications    Medication Sig Start Date End Date Taking? Authorizing Provider   rOPINIRole (REQUIP) 1 MG tablet Take 1 tablet by mouth nightly 21  Yes Ludivina Simmons Do, MD   budesonide-formoterol [facility]) 160-4.5 MCG/ACT AERO Inhale 2 puffs into the lungs 2 times daily 3/31/21  Yes Ludivina Simmons Do, MD   tiZANidine (ZANAFLEX) 4 MG tablet Take 1 tablet by mouth every 8 hours as needed (back pain) 3/31/21  Yes Ludivina Simmons Do, MD   sertraline (ZOLOFT) 100 MG tablet Take 1.5 tablets by mouth daily 3/22/21  Yes LOI Shipman NP   traZODone (DESYREL) 50 MG tablet Take 0.5 tablets by mouth nightly as needed for Sleep 3/22/21  Yes LOI Shipman NP   hydrOXYzine (ATARAX) 50 MG tablet TAKE ONE TABLET BY MOUTH THREE TIMES A DAY 3/12/21  Yes Ludivina Simmons Do, MD   omeprazole (PRILOSEC) 20 MG delayed release capsule Take 1 capsule by mouth 2 times daily 3/5/21  Yes Magui Parson MD   pyridoxine (RA VITAMIN B-6) 50 MG tablet Take 2 tablets by mouth daily 21 Yes Juan Castorena MD   amLODIPine (NORVASC) 5 MG tablet TAKE ONE TABLET BY MOUTH DAILY 21  Yes Ludivina Simmons Do, MD   pregabalin (LYRICA) 150 MG capsule Take 1 capsule by mouth 3 times daily for 90 days.  21 Yes Juan Castorena MD   potassium chloride (KLOR-CON M) 20 MEQ extended release tablet Take 1 tablet by mouth daily 9/10/20  Yes LOI Feng CNP   ferrous sulfate (IRON 325) 325 (65 Fe) MG tablet Take 1 tablet by mouth 2 times daily 20  Yes Ludivina Simmons Do, MD   ACETAMINOPHEN EXTRA STRENGTH 500 MG tablet Take 500 mg by mouth 3 times daily as needed 5/8/20  Yes Historical Provider, MD   albuterol sulfate HFA (VENTOLIN HFA) 108 (90 Base) MCG/ACT inhaler Inhale 2 puffs into the lungs 4 times daily as needed for Wheezing 6/11/20  Yes Ludivina Velez MD   vitamin B-1 (THIAMINE) 100 MG tablet Take 1 tablet by mouth daily 7/12/19  Yes Ludivina Velez MD   folic acid (FOLVITE) 1 MG tablet Take 1 tablet by mouth daily 6/19/19  Yes Ludivina Velez MD   acamprosate (CAMPRAL) 333 MG tablet Take 2 tablets by mouth 3 times daily 3/22/21 4/21/21  LOI Ambrose NP   tiotropium (SPIRIVA RESPIMAT) 2.5 MCG/ACT AERS inhaler Inhale 2 puffs into the lungs daily 2/26/21 3/28/21  Weston Lazcano MD   carBAMazepine (TEGRETOL XR) 200 MG extended release tablet TAKE ONE TABLET BY MOUTH THREE TIMES A DAY 2/8/21   Ludivina Velez MD   celecoxib (CELEBREX) 100 MG capsule Take 1 capsule by mouth daily 12/28/20   Ludivina Velez MD   sodium chloride (ALTAMIST SPRAY) 0.65 % nasal spray 1 spray by Nasal route as needed for Congestion 12/28/20   Ludivina Velez MD   propranolol (INDERAL) 40 MG tablet Take 40 mg by mouth 2 times daily 5/12/20   Historical Provider, MD   vitamin D (ERGOCALCIFEROL) 77773 units CAPS capsule Take 1 capsule by mouth once a week 6/19/19   Ludivina Velez MD       Current medications:    No current facility-administered medications for this encounter.         Allergies:  No Known Allergies    Problem List:    Patient Active Problem List   Diagnosis Code    Acute bronchitis J20.9    Reflex sympathetic dystrophy of lower limb G90.529    Essential hypertension I10    Nicotine addiction F17.200    Psychophysiological insomnia F51.04    Anxiety I84.6    Alcoholic peripheral neuropathy (HCC) G62.1    Hypokalemia E87.6    Macrocytic anemia D53.9    Hypomagnesemia E83.42    Tobacco use Z72.0    Ambulatory dysfunction R26.2    Seizures (HCC) R56.9    Paresthesia of lower extremity R20.2    Pancreatic cyst K86.2    Recurrent major depressive disorder (HCC) F33.9    Elevated CA 19-9 level R97.8    Perineal mass, female N90.89    Esophagitis candidal  B37.81    Condyloma A63.0    Astrocytoma (HCC) - 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24, the patient underwent 2 surgical resections without known recurrence C71.9    Alcohol use disorder, severe, in early remission ([address] F10.21    Acute metabolic encephalopathy U24.64    AMAN (generalized anxiety disorder) F41.1    Major depressive disorder, recurrent severe without psychotic features ([address] F33.2       Past Medical History:        Diagnosis Date    Acute respiratory failure with hypoxia ([address] 10/16/2020    Alcohol withdrawal syndrome, with delirium ([address] 12/14/2019    Alcoholism ([address]     Anemia 10/2020    GI bleed    Astrocytoma ([facility] - 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24, the patient underwent 2 surgical resections without known recurrence 10/23/2020    Closed fracture of lateral portion of left tibial plateau 49/11/6659    COPD (chronic obstructive pulmonary disease) ([facility])     CO2 retainer, on Bipap at night for this, Dr. Desmond Celis ( last visit 11/20/2020 and note on chart )    Depression     bipolar, major depressive disorder, ptsd, anxiety    Dysphagia     GI bleed 10/2020    Hypertension     Memory loss     Oxygen dependent     pt stated not needed as of 12/9/2020    Pain, joint, ankle and foot     Pancreatic lesion 10/2020    Dr. Yves Jimenez working up pt    Seizures [facility])     also baseline tremors       Past Surgical History:        Procedure Laterality Date    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    COLONOSCOPY N/A 10/22/2020    COLONOSCOPY DIAGNOSTIC performed by Gail Jean-Baptiste MD at [facility]    ENDOSCOPIC ULTRASOUND (LOWER) N/A 12/9/2020    ENDOSCOPIC ULTRASOUND, UPPER WITH LINEAR SCOPE FOR BIOPSY OF MASS ON HEAD OF PANCREAS performed by Renate Newton MD at [address] Left 7-3-13    ORIF tibial plateau    FRACTURE SURGERY Right     small finger metacarpal fracture    HYSTERECTOMY  2003    UPPER GASTROINTESTINAL ENDOSCOPY N/A 10/22/2020    EGD BIOPSY performed by Edi Rodriguez MD at hospitals Endoscopy       Social History:    Social History     Tobacco Use    Smoking status: Current Every Day Smoker     Packs/day: 0.50     Years: 30.00     Pack years: 15.00     Types: Cigarettes    Smokeless tobacco: Never Used    Tobacco comment: plans on quitting in the future    Substance Use Topics    Alcohol use: Not Currently     Alcohol/week: 0.0 standard drinks                                Ready to quit: Not Answered  Counseling given: Not Answered  Comment: plans on quitting in the future       Vital Signs (Current):   Vitals:    04/05/21 0726   BP: (!) 134/92   Pulse: 103   Resp: 19   SpO2: 94%   Weight: 120 lb (54.4 kg)   Height: 5' 5\" (1.651 m)                                              BP Readings from Last 3 Encounters:   04/05/21 (!) 134/92   03/31/21 116/78   02/02/21 (!) 159/106       NPO Status: Time of last liquid consumption: 2300                        Time of last solid consumption: 1800                        Date of last liquid consumption: 04/04/21                        Date of last solid food consumption: 04/04/21    BMI:   Wt Readings from Last 3 Encounters:   04/05/21 120 lb (54.4 kg)   03/31/21 133 lb 9.6 oz (60.6 kg)   02/02/21 115 lb (52.2 kg)     Body mass index is 19.97 kg/m².     CBC:   Lab Results   Component Value Date    WBC 7.0 12/23/2020    RBC 4.17 12/23/2020    RBC 4.16 04/30/2012    HGB 13.4 12/23/2020    HCT 40.1 12/23/2020    MCV 96.1 12/23/2020    RDW 14.9 12/23/2020     12/23/2020     04/30/2012       CMP:   Lab Results   Component Value Date     12/23/2020    K 4.7 12/23/2020     12/23/2020    CO2 27 12/23/2020    BUN 5 12/23/2020    CREATININE 0.47 02/12/2021    GFRAA >60 02/12/2021    LABGLOM >60 02/12/2021    GLUCOSE 109 12/23/2020    GLUCOSE 92 04/30/2012    PROT 6.9 02/03/2021    CALCIUM 9.8 12/23/2020 BILITOT 0.17 12/23/2020    ALKPHOS 164 12/23/2020    AST 24 12/23/2020    ALT 7 12/23/2020       POC Tests: No results for input(s): POCGLU, POCNA, POCK, POCCL, POCBUN, POCHEMO, POCHCT in the last 72 hours. Coags:   Lab Results   Component Value Date    PROTIME 11.3 10/26/2020    INR 1.1 10/26/2020       HCG (If Applicable): No results found for: PREGTESTUR, PREGSERUM, HCG, HCGQUANT     ABGs: No results found for: PHART, PO2ART, FOG6VAD, HZR6XMJ, BEART, E1NLYCFL     Type & Screen (If Applicable):  No results found for: LABABO, LABRH    Drug/Infectious Status (If Applicable):  Lab Results   Component Value Date    HEPCAB NONREACTIVE 07/14/2019       COVID-19 Screening (If Applicable):   Lab Results   Component Value Date    COVID19 Indeterminate 03/12/2021    COVID19 Not Detected 12/05/2020           Anesthesia Evaluation  Patient summary reviewed and Nursing notes reviewed  Airway: Mallampati: II        Dental: normal exam   (+) partials      Pulmonary:normal exam    (+) COPD: moderate,            Patient smoked on day of surgery. Cardiovascular:  Exercise tolerance: good (>4 METS),   (+) hypertension: moderate,       ECG reviewed               Beta Blocker:  Not on Beta Blocker         Neuro/Psych:               GI/Hepatic/Renal: Neg GI/Hepatic/Renal ROS            Endo/Other: Negative Endo/Other ROS                    Abdominal:           Vascular:                                        Anesthesia Plan      MAC and general     ASA 3       Induction: intravenous. Anesthetic plan and risks discussed with patient. Use of blood products discussed with patient whom. Plan discussed with attending.     Attending anesthesiologist reviewed and agrees with ANDREW Addison 46, APRN - CRNA   4/5/2021

## 2021-04-05 NOTE — PROGRESS NOTES
1022 RETURNED POST PROCEDURE SKIN WARM COLOR PINK ABDOMEN SOFTLY DISTENDED RESTING QUIETLY WITH EYES CLOSED REMAINS ON MONITORS

## 2021-04-05 NOTE — ANESTHESIA POSTPROCEDURE EVALUATION
Department of Anesthesiology  Postprocedure Note    Patient: Orly Rader  MRN: [id number]  YOB: 1970  Date of evaluation: 4/5/2021  Time:  1:56 PM     Procedure Summary     Date: 04/05/21 Room / Location: [address]    Anesthesia Start: 9768 Anesthesia Stop: 7970    Procedure: EGD BIOPSY (N/A ) Diagnosis: (DYSPHAGIA, ESOPHAGEAL NARROWING)    Surgeons: Pako Murguia MD Responsible Provider: Melony Loving MD    Anesthesia Type: MAC, general ASA Status: 3          Anesthesia Type: MAC, general    Marcell Phase I:      Marcell Phase II: Marcell Score: 10    Last vitals: Reviewed and per EMR flowsheets.        Anesthesia Post Evaluation    Patient location during evaluation: bedside  Patient participation: complete - patient participated  Level of consciousness: awake and alert  Pain score: 0  Nausea & Vomiting: no nausea  Respiratory status: room air

## 2021-04-05 NOTE — OP NOTE
Operative Note      Patient: Rahat Serrano  YOB: 1970  MRN: [id number]    Date of Procedure: 4/5/2021    Pre-Op Diagnosis: DYSPHAGIA, ESOPHAGEAL NARROWING on esophogram      [facility]    [facility] ENDOSCOPY     EGD    PROCEDURE DATE: 04/05/21    REFERRING PHYSICIAN: No ref. provider found     PRIMARY CARE PROVIDER: LIZBETH Maria MD    ATTENDING PHYSICIAN: Jory Carson MD     HISTORY: Ms. Rahat Serrano is a 45 y.o. female who presents to the [facility] Endoscopy unit for upper endoscopy. The patient's clinical history is remarkable for HTN, anxiety, alcoholic peripheral neuropathy, tobacco use, pancreatic cyst, abnormal esophogram, possible narrowing, referred for diagnostic EGD. She is currently medically stable and appropriate for the planned procedure. PREOPERATIVE DIAGNOSIS: Dysphagia, abnormal esophogram.     PROCEDURES:   1) Transoral Upper Endoscopy with cold biopsy of the mid-esophagus. POSTOPERATIVE DIAGNOSIS:     1) No obvious intraluminal lesions, strictures or masses. Tertiary contractions observed during procedure. Normal appearing mucosa, small angulation in the mid-esophagus at 25 cm, presumably extraluminal compression, subtle. Cold biopsy taken at this site  2) Large amount of undigested food identified in the stomach suggesting poor gastric motility or non-adherence to dietary instructions        MEDICATIONS:   MAC per anesthesia     EBL:  <10cc    INSTRUMENT: Olympus GIF-H180  flexible Gastroscope. PREPARATION: The nature and character of the procedure as well as risks, benefits, and alternatives were discussed with the patient and informed consent was obtained. Complications were said to include, but were not limited to: medication allergy, medication reaction, cardiovascular and respiratory problems, bleeding, perforation, infection, and/or missed diagnosis.  Following arrival in the endoscopy room, the patient was placed in the left lateral decubitus position and final time-out accomplished in the presence of the nursing staff. Baseline vital signs were obtained and reviewed, and IV sedation was subsequently initiated. FINDINGS:   Esophagus: The esophagus was inspected to the Z-line. The endoscopic exam showed no obvious obstructive lesions or strictures. Tertiary contractions seen     Stomach: The stomach was inspected in both forward and retroflex fashion and was appropriately distensible. The cardia, fundus, incisura, antrum and pylorus were identified via direct visualization. The endoscopic exam showed large amount of food in the stomach body, limited visualizations. Duodenum: The proximal small bowel was inspected through the bulb, sweep, and second portion of the duodenum. The endoscopic exam showed normal appearing. IMPRESSION:  1) No obvious intraluminal lesions, strictures or masses. Tertiary contractions observed during procedure. Normal appearing mucosa, small angulation in the mid-esophagus at 25 cm, presumably extraluminal compression, subtle. Cold biopsy taken at this site  2) Large amount of undigested food identified in the stomach suggesting poor gastric motility or non-adherence to dietary instructions          RECOMMENDATIONS:   1) Follow up with referring provider, as previously scheduled. 2) Follow up path in GI clinic. Consider motility testing as outpatient (gastric and esophageal). [facility]     This note is created with the assistance of a speech recognition program.  While intending to generate a document that actually reflects the content of the visit, the document can still have some errors including those of syntax and sound a like substitutions which may escape proof reading. It such instances, actual meaning can be extrapolated by contextual diversion.     The patient was counseled at length about the risks of marie Covid-19 during their perioperative period and any recovery window from their procedure. The patient was made aware that marie Covid-19  may worsen their prognosis for recovering from their procedure  and lend to a higher morbidity and/or mortality risk. All material risks, benefits, and reasonable alternatives including postponing the procedure were discussed. The patient DOES wish to proceed with the procedure at this time.             Specimens:   ID Type Source Tests Collected by Time Destination   A : Esophageal bx. @ 25 cm Tissue Esophagus SURGICAL PATHOLOGY Evelina Taylor MD 4/5/2021 1015          Electronically signed by Evelina Taylor MD on 4/5/2021 at 10:19 AM

## 2021-04-05 NOTE — H&P
History and Physical    Pt Name: Matteo Givens  MRN: [id number]  YOB: 1970  Date of evaluation: 4/5/2021    SUBJECTIVE:   History of Chief Complaint:    Patient presents preprocedure for EGD. She has had EGD and colonoscopy in the past when she was inpatient for anemia, then EUS which showed chronic changes relating to the pancreas. Patient has had an esophagram as well (3/16/21) , narrowing noted. She has been scheduled for EGD today. Patient reports dysphagia, sensation of food/saliva \"getting stuck\" in the back of the throat. She says that saliva in the PM and solid food during the day are the problems. Past Medical History    has a past medical history of Acute respiratory failure with hypoxia ([address] Alcohol withdrawal syndrome, with delirium ([address] Alcoholism ([address] Anemia, 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24, the patient underwent 2 surgical resections without known recurrence, Closed fracture of lateral portion of left tibial plateau, COPD (chronic obstructive pulmonary disease) ([address] Depression, Dysphagia, GI bleed, Hypertension, Memory loss, Oxygen dependent, Pain, joint, ankle and foot, Pancreatic lesion, and Seizures ([address] Past Surgical History   has a past surgical history that includes Hysterectomy (2003); Brain tumor excision (1989); fracture surgery (Left, 7-3-13); fracture surgery (Right); Upper gastrointestinal endoscopy (N/A, 10/22/2020); Colonoscopy (N/A, 10/22/2020); and Endoscopic ultrasonography, GI (N/A, 12/9/2020). Medications  Prior to Admission medications    Medication Sig Start Date End Date Taking?  Authorizing Provider   rOPINIRole (REQUIP) 1 MG tablet Take 1 tablet by mouth nightly 4/1/21   Ludivina Dan MD   budesonide-formoterol [facility]) 160-4.5 MCG/ACT AERO Inhale 2 puffs into the lungs 2 times daily 3/31/21   Ludivina Dan MD   tiZANidine (ZANAFLEX) 4 MG tablet Take 1 tablet by mouth every 8 hours as needed (back pain) 3/31/21   Ludivina Dan MD   acamprosate (CAMPRAL) 333 MG tablet Take 2 tablets by mouth 3 times daily 3/22/21 4/21/21  Seymour Mckinnon APRN - NP   sertraline (ZOLOFT) 100 MG tablet Take 1.5 tablets by mouth daily 3/22/21   Seymour Ino, APRN - NP   traZODone (DESYREL) 50 MG tablet Take 0.5 tablets by mouth nightly as needed for Sleep 3/22/21   Seymour Mckinnon APRN - NP   hydrOXYzine (ATARAX) 50 MG tablet TAKE ONE TABLET BY MOUTH THREE TIMES A DAY 3/12/21   Ludivina Lagos MD   omeprazole (PRILOSEC) 20 MG delayed release capsule Take 1 capsule by mouth 2 times daily 3/5/21   Ana Lilia Escalante MD   pyridoxine (RA VITAMIN B-6) 50 MG tablet Take 2 tablets by mouth daily 2/26/21 8/25/21  Salud Wood MD   tiotropium (SPIRIVA RESPIMAT) 2.5 MCG/ACT AERS inhaler Inhale 2 puffs into the lungs daily 2/26/21 3/28/21  Aakash Guzman MD   amLODIPine (NORVASC) 5 MG tablet TAKE ONE TABLET BY MOUTH DAILY 2/8/21   Ludivina Lagos MD   carBAMazepine (TEGRETOL XR) 200 MG extended release tablet TAKE ONE TABLET BY MOUTH THREE TIMES A DAY 2/8/21   Ludivina Lagos MD   pregabalin (LYRICA) 150 MG capsule Take 1 capsule by mouth 3 times daily for 90 days.  2/16/21 5/17/21  Salud Wood MD   celecoxib (CELEBREX) 100 MG capsule Take 1 capsule by mouth daily 12/28/20   Ludivina Lagos MD   sodium chloride (ALTAMIST SPRAY) 0.65 % nasal spray 1 spray by Nasal route as needed for Congestion 12/28/20   Ludivina Lagos MD   potassium chloride (KLOR-CON M) 20 MEQ extended release tablet Take 1 tablet by mouth daily 9/10/20   Dahlia Benítez APRN - CNP   ferrous sulfate (IRON 325) 325 (65 Fe) MG tablet Take 1 tablet by mouth 2 times daily 7/16/20   Ludivina Lagos MD   ACETAMINOPHEN EXTRA STRENGTH 500 MG tablet Take 500 mg by mouth 3 times daily as needed 5/8/20   Laisha Newby MD   propranolol (INDERAL) 40 MG tablet Take 40 mg by mouth 2 times daily 5/12/20   Historical Provider, MD   albuterol sulfate HFA (VENTOLIN HFA) 108 (90 Base) MCG/ACT inhaler Inhale 2 puffs into the lungs 4 times daily as needed for Wheezing 6/11/20   Ludivina Arrington MD   vitamin B-1 (THIAMINE) 100 MG tablet Take 1 tablet by mouth daily 7/12/19   Ludivina Arrington MD   vitamin D (ERGOCALCIFEROL) 54754 units CAPS capsule Take 1 capsule by mouth once a week 6/19/19   Ludivina Arrington MD   folic acid (FOLVITE) 1 MG tablet Take 1 tablet by mouth daily 6/19/19   Ludivina Arrington MD     Allergies  has No Known Allergies. Family History  family history includes Cancer in her maternal grandmother; Esophageal Cancer in her maternal aunt; Heart Disease in her paternal grandmother; Other in her father. Social History   reports that she has been smoking cigarettes. She has a 15.00 pack-year smoking history. She has never used smokeless tobacco.   reports previous alcohol use. reports previous drug use. Marital Status single  Occupation none    OBJECTIVE:   VITALS:  height is 5' 5\" (1.651 m) and weight is 120 lb (54.4 kg). Her blood pressure is 134/92 (abnormal) and her pulse is 103. Her respiration is 19 and oxygen saturation is 94%. CONSTITUTIONAL:alert & oriented x 3, no acute distress. Pleasant. SKIN:  Warm and dry, no rashes on exposed areas of skin. HEAD:  Normocephalic, atraumatic   EYES: PERRL. EOMs intact. Wearing glasses. EARS:  Hearing grossly WNL. NOSE:  Nares patent. No rhinorrhea   MOUTH/THROAT:  benign  NECK:supple, no lymphadenopathy  LUNGS: scattered rhonchi that partially clear with cough, prolonged expiration. CARDIOVASCULAR: Heart sounds are normal.  Regular rate and rhythm without murmur. ABDOMEN: soft, non distended, thin. Mild epigastric tenderness without rebound/guarding. EXTREMITIES: no edema bilateral lower extremities.     IMPRESSIONS:   dysphagia   has a past medical history of Acute respiratory failure with hypoxia ([address] (10/16/2020), Alcohol withdrawal syndrome, with delirium ([facility] (12/14/2019), Alcoholism ([facility] Anemia (10/2020), Astrocytoma ([facility] - 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24, the patient underwent 2 surgical resections without known recurrence (10/23/2020), Closed fracture of lateral portion of left tibial plateau (26/72/2375), COPD (chronic obstructive pulmonary disease) ([facility] Depression, Dysphagia, GI bleed (10/2020), Hypertension, Memory loss, Oxygen dependent, Pain, joint, ankle and foot, Pancreatic lesion (10/2020), and Seizures ([facility]    PLANS:   SKYLER PALAFOX PA-C  Electronically signed 4/5/2021 at 7:31 AM

## 2021-04-07 LAB — SURGICAL PATHOLOGY REPORT: NORMAL

## 2021-04-08 ENCOUNTER — TELEMEDICINE (OUTPATIENT)
Dept: PSYCHOLOGY | Age: 51
End: 2021-04-08
Payer: MEDICARE

## 2021-04-08 DIAGNOSIS — F33.1 MAJOR DEPRESSIVE DISORDER, RECURRENT EPISODE, MODERATE WITH ANXIOUS DISTRESS (HCC): Primary | ICD-10-CM

## 2021-04-08 PROCEDURE — 90834 PSYTX W PT 45 MINUTES: CPT | Performed by: PSYCHOLOGIST

## 2021-04-08 PROCEDURE — 4004F PT TOBACCO SCREEN RCVD TLK: CPT | Performed by: PSYCHOLOGIST

## 2021-04-08 NOTE — PROGRESS NOTES
Jefry Kulkarni M.A. Psychology Doctoral Trainee    Supervising Clinical Psychologists:  Steve Dhillon, Ph.D. Farnaz Mckenna,  Ph.D. Francy Knapp        Visit Date: 4/8/2021   Time of appointment:  3:00PM - 3:38PM   Time spent with Patient: 38 minutes. This is patient's fourth appointment. Reason for Consult:  Depression and Anxiety     Referring Provider/PCP:    No ref. provider found  Rupal Zepeda MD      Pt provided informed consent for the behavioral health program. Discussed with patient model of service to include the limits of confidentiality (i.e. abuse reporting, suicide intervention, etc.) and short-term intervention focused approach. Pt indicated understanding. Pt provided verbal consent to engage in telehealth psychotherapy. This visit was completed virtually using doxy. me due to contact restrictions related to the COVID-19 pandemic. Session was held in a private place (pt's home). Because of the virtual delivery method, certain behavioral observations were not assessed during this appointment.      Pt provided verbal consent for the following emergency contact: Trent Prather (mother) at [phone].     [facility] [place]/Clinician location: [facility] in [place], [place]. David Dailey is a 45 y.o. female who presents for follow up of depression and anxiety symptoms, including low mood, anhedonia, fatigue and low energy, sleep difficulties, concentration difficulties, low self-worth, feelings of nervousness and dread, persistent worries about specific stressors, and restlessness. Session activities included discussion of activity scheduling, processing of recent lapse in sobriety. Pt continued to report chronic pain and urgent life stressors (I.e., continuing to wait for disability, growing debt for electric/utility companies).   Pt described a recent lapse in sobriety from alcohol; pt's emotions about the situation were identified and processed, and possible next steps to support sobriety (e.g., exploring involvement in AA) were explored. Previous Recommendations:   1. Pt will continue to engage in enjoyable activities (color, listen to podcasts and music, practice gratitude, cook). 2. Next session in 1.5 weeks: will review and continue activity scheduling, continue processing health changes, begin discussing other challenges from growing up      [address] was sad with congruent affect. Suicidal ideation was denied. Homicidal ideation was denied. Hygiene was not assessed. Dress was not assessed. Behavior was not assessed with No observation of difficulties ambulating. Attitude was Engageable, Friendly and Help-seeking. Eye-contact was not assessed. Speech: rate - WNL, rhythm - WNL, volume - WNL. Verbalizations were goal directed and coherent. Thought processes were intact and goal-oriented without evidence of delusions, hallucinations, obsessions, or nathaniel; with moderate cognitive distortions. Associations were characterized by intact cognitive processes. Pt was oriented to person, place, time, and general circumstances;  recent:  good and remote:  good. Insight and judgment were estimated to be fair, AEB, a fair  understanding of cyclical maladaptive patterns, and the ability to use insight to inform behavior change. ASSESSMENT  Orly Rader presented to the appointment today for f/u of symptoms of depression and anxiety, including anhedonia, low mood, fatigue and low energy, sleep difficulties, concentration difficulties, low self-worth, feelings of nervousness and dread, persistent worry about specific stressors, and restlessness. Pt is currrently in the process of establishing social security disability, and has significant financial stressors due to unemployment. She is currently deemed low risk to herself or others.   She meets criteria for Major Depressive Disorder, Recurrent, Current episode moderate, with anxious distress. Pt's goals for treatment include processing challenging stressors/historical situations and improving her self-care and coping strategies. Tx approach will include emotion processing and self-care emphasis, and possibly stress management. Junior Urrutia was in agreement with recommendations. PHQ Scores 2/5/2021 12/18/2018   PHQ2 Score 4 0   PHQ9 Score 13 0     Interpretation of Total Score Depression Severity: 1-4 = Minimal depression, 5-9 = Mild depression, 10-14 = Moderate depression, 15-19 = Moderately severe depression, 20-27 = Severe depression    How often pt has had thoughts of death or hurting self (if PHQ positive for depression):       AMAN 7 SCORE 2/8/2021   AMAN-7 Total Score 14     Interpretation of AMAN-7 score: 5-9 = mild anxiety, 10-14 = moderate anxiety, 15+ = severe anxiety. Recommend referral to behavioral health for scores 10 or greater. DIAGNOSIS  Junior Urrutia was seen today for depression and anxiety. Diagnoses and all orders for this visit:    Major depressive disorder, recurrent episode, moderate with anxious distress ([facility]      INTERVENTION  Discussed and set plan for behavioral activation, Discussed self-care (sleep, nutrition, rewarding activities, social support, exercise), Established rapport, [place]-setting to identify pt's primary goals for [facility] visit / overall health, Supportive techniques, Emphasized self-care as important for managing overall health and Provided Psychoeducation re: emotions, AA,      PLAN  1. Pt will continue to engage in enjoyable activities (color, listen to podcasts and music, practice gratitude, cook). 2. Pt will attend an online/Zoom AA meeting. 3. Next session in 1.5 weeks: will review and continue activity scheduling, process attendance of [facility] meeting, continue to discuss emotions related to current and previous life stressors       INTERACTIVE COMPLEXITY  Is interactive complexity present? No  Reason:  N/A  Additional Supporting Information:  N/A

## 2021-04-12 DIAGNOSIS — F32.9 MAJOR DEPRESSIVE DISORDER, REMISSION STATUS UNSPECIFIED, UNSPECIFIED WHETHER RECURRENT: ICD-10-CM

## 2021-04-12 RX ORDER — SERTRALINE HYDROCHLORIDE 100 MG/1
TABLET, FILM COATED ORAL
Qty: 45 TABLET | Refills: 0 | Status: SHIPPED | OUTPATIENT
Start: 2021-04-12 | End: 2021-04-15

## 2021-04-12 NOTE — TELEPHONE ENCOUNTER
.Please Approve or Refuse.   Send to Pharmacy per Pt's Request:      Next Visit Date:  4/19/2021   Last Visit Date: 3/22/2021

## 2021-04-13 ENCOUNTER — HOSPITAL ENCOUNTER (OUTPATIENT)
Age: 51
Setting detail: SPECIMEN
Discharge: HOME | End: 2021-04-13
Payer: MEDICARE

## 2021-04-13 DIAGNOSIS — G60.9 IDIOPATHIC PERIPHERAL NEUROPATHY: ICD-10-CM

## 2021-04-13 LAB
ESTIMATED AVERAGE GLUCOSE: 111 MG/DL
HBA1C MFR BLD: 5.5 % (ref 4–6)

## 2021-04-14 LAB — ANTI-NUCLEAR ANTIBODY (ANA): NEGATIVE

## 2021-04-14 ASSESSMENT — ENCOUNTER SYMPTOMS
CHEST TIGHTNESS: 0
ABDOMINAL PAIN: 0
CHOKING: 0
SHORTNESS OF BREATH: 0
BLOOD IN STOOL: 0
COUGH: 0
WHEEZING: 0
DIARRHEA: 0
NAUSEA: 0
CONSTIPATION: 0
ANAL BLEEDING: 0
VOMITING: 0

## 2021-04-14 ASSESSMENT — VISUAL ACUITY: OU: 1

## 2021-04-15 DIAGNOSIS — F32.9 MAJOR DEPRESSIVE DISORDER, REMISSION STATUS UNSPECIFIED, UNSPECIFIED WHETHER RECURRENT: ICD-10-CM

## 2021-04-15 LAB
ANCA MYELOPEROXIDASE: <0.3 AU/ML (ref 0–3.5)
ANCA PROTEINASE 3: <0.7 AU/ML (ref 0–2)

## 2021-04-15 RX ORDER — SERTRALINE HYDROCHLORIDE 100 MG/1
TABLET, FILM COATED ORAL
Qty: 45 TABLET | Refills: 0 | Status: SHIPPED | OUTPATIENT
Start: 2021-04-15 | End: 2021-04-19 | Stop reason: SDUPTHER

## 2021-04-19 ENCOUNTER — TELEMEDICINE (OUTPATIENT)
Dept: PSYCHIATRY | Age: 51
End: 2021-04-19
Payer: MEDICARE

## 2021-04-19 ENCOUNTER — TELEMEDICINE (OUTPATIENT)
Dept: PSYCHOLOGY | Age: 51
End: 2021-04-19
Payer: MEDICARE

## 2021-04-19 DIAGNOSIS — F10.21 ALCOHOL USE DISORDER, SEVERE, IN EARLY REMISSION (HCC): ICD-10-CM

## 2021-04-19 DIAGNOSIS — F41.1 GAD (GENERALIZED ANXIETY DISORDER): ICD-10-CM

## 2021-04-19 DIAGNOSIS — F33.1 MAJOR DEPRESSIVE DISORDER, RECURRENT EPISODE, MODERATE WITH ANXIOUS DISTRESS (HCC): Primary | ICD-10-CM

## 2021-04-19 DIAGNOSIS — F32.9 MAJOR DEPRESSIVE DISORDER, REMISSION STATUS UNSPECIFIED, UNSPECIFIED WHETHER RECURRENT: Primary | ICD-10-CM

## 2021-04-19 PROCEDURE — 90834 PSYTX W PT 45 MINUTES: CPT | Performed by: PSYCHOLOGIST

## 2021-04-19 PROCEDURE — 4004F PT TOBACCO SCREEN RCVD TLK: CPT | Performed by: PSYCHOLOGIST

## 2021-04-19 PROCEDURE — 99214 OFFICE O/P EST MOD 30 MIN: CPT | Performed by: NURSE PRACTITIONER

## 2021-04-19 RX ORDER — ACAMPROSATE CALCIUM 333 MG/1
666 TABLET, DELAYED RELEASE ORAL 3 TIMES DAILY
Qty: 180 TABLET | Refills: 0 | Status: SHIPPED | OUTPATIENT
Start: 2021-04-19 | End: 2021-05-17 | Stop reason: SDUPTHER

## 2021-04-19 RX ORDER — TRAZODONE HYDROCHLORIDE 50 MG/1
75 TABLET ORAL NIGHTLY PRN
Qty: 45 TABLET | Refills: 0 | Status: SHIPPED | OUTPATIENT
Start: 2021-04-19 | End: 2021-05-17 | Stop reason: SDUPTHER

## 2021-04-19 RX ORDER — SERTRALINE HYDROCHLORIDE 100 MG/1
150 TABLET, FILM COATED ORAL DAILY
Qty: 45 TABLET | Refills: 0 | Status: SHIPPED | OUTPATIENT
Start: 2021-04-19 | End: 2021-05-17

## 2021-04-19 NOTE — PROGRESS NOTES
to stay connected with the present moment. Pt continued to report chronic pain and urgent life stressors (I.e., continuing to wait for disability, growing debt for electric/utility companies). Pt's experience attending an [place] [address] meeting was also processed, with possible pros and cons identified. Previous Recommendations:   1. Pt will continue to engage in enjoyable activities (color, listen to podcasts and music, practice gratitude, cook). 2. Pt will attend an online/Zoom AA meeting. 3. Next session in 1.5 weeks: will review and continue activity scheduling, process attendance of [place] [address] meeting, continue to discuss emotions related to current and previous life stressors      MENTAL STATUS EXAM  Mood was sad with congruent affect. Suicidal ideation was denied. Homicidal ideation was denied. Hygiene was not assessed. Dress was not assessed. Behavior was not assessed with No observation of difficulties ambulating. Attitude was Engageable, Friendly and Help-seeking. Eye-contact was not assessed. Speech: rate - WNL, rhythm - WNL, volume - WNL. Verbalizations were goal directed and coherent. Thought processes were intact and goal-oriented without evidence of delusions, hallucinations, obsessions, or nathaniel; with moderate cognitive distortions. Associations were characterized by intact cognitive processes. Pt was oriented to person, place, time, and general circumstances;  recent:  good and remote:  good. Insight and judgment were estimated to be fair, AEB, a fair  understanding of cyclical maladaptive patterns, and the ability to use insight to inform behavior change. ASSESSMENT  Gregoria Macias presented to the appointment today for f/u of symptoms of depression and anxiety, including anhedonia, low mood, fatigue and low energy, sleep difficulties, concentration difficulties, low self-worth, feelings of nervousness and dread, persistent worry about specific stressors, and restlessness.   Pt is currrently in the process of establishing social security disability, and has significant financial stressors due to unemployment. She is currently deemed low risk to herself or others. She meets criteria for Major Depressive Disorder, Recurrent, Current episode moderate, with anxious distress. Pt's goals for treatment include processing challenging stressors/historical situations and improving her self-care and coping strategies. Tx approach includes emotion processing, behavioral activation and self-care emphasis, and possibly stress management. [facility] was in agreement with recommendations. PHQ Scores 2/5/2021 12/18/2018   PHQ2 Score 4 0   PHQ9 Score 13 0     Interpretation of Total Score Depression Severity: 1-4 = Minimal depression, 5-9 = Mild depression, 10-14 = Moderate depression, 15-19 = Moderately severe depression, 20-27 = Severe depression    How often pt has had thoughts of death or hurting self (if PHQ positive for depression):       AMAN 7 SCORE 2/8/2021   AMAN-7 Total Score 14     Interpretation of AMAN-7 score: 5-9 = mild anxiety, 10-14 = moderate anxiety, 15+ = severe anxiety. Recommend referral to behavioral health for scores 10 or greater. DIAGNOSIS  [facility] was seen today for depression. Diagnoses and all orders for this visit:    Major depressive disorder, recurrent episode, moderate with anxious distress ([address]      INTERVENTION  Discussed and set plan for behavioral activation, Discussed self-care (sleep, nutrition, rewarding activities, social support, exercise), Established rapport, [place]-setting to identify pt's primary goals for [address] visit / overall health, Supportive techniques, Emphasized self-care as important for managing overall health and Provided Psychoeducation re: emotions, AA,      PLAN  1. Pt will utilize grounding in response to unhelpful thoughts (e.g., \"go to bed\" in middle of day). Pt will also practice sleep hygiene (avoiding bed and naps during day).   2. Pt will continue to engage in enjoyable activities (color, listen to podcasts and music, practice gratitude, cook), will attend another AA meeting. 3. Next session in 2 weeks: will review and continue activity scheduling and psychoeducation on depression, continue to discuss emotions related to current and previous life stressors       INTERACTIVE COMPLEXITY  Is interactive complexity present?   No  Reason:  N/A  Additional Supporting Information:  N/A
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
## 2021-04-21 DIAGNOSIS — D50.9 IRON DEFICIENCY ANEMIA, UNSPECIFIED IRON DEFICIENCY ANEMIA TYPE: ICD-10-CM

## 2021-04-21 NOTE — TELEPHONE ENCOUNTER
Patient needs refill on:  -Ferrous Sulfate    [facility]

## 2021-04-22 ENCOUNTER — HOSPITAL ENCOUNTER (OUTPATIENT)
Dept: VASCULAR LAB | Age: 51
Discharge: HOME | End: 2021-04-22
Payer: MEDICARE

## 2021-04-22 DIAGNOSIS — I73.9 CLAUDICATION (HCC): ICD-10-CM

## 2021-04-22 PROCEDURE — 93922 UPR/L XTREMITY ART 2 LEVELS: CPT

## 2021-04-22 RX ORDER — FERROUS SULFATE 325(65) MG
325 TABLET ORAL 2 TIMES DAILY
Qty: 180 TABLET | Refills: 1 | Status: SHIPPED | OUTPATIENT
Start: 2021-04-22 | End: 2021-11-01 | Stop reason: ALTCHOICE

## 2021-04-30 ENCOUNTER — TELEPHONE (OUTPATIENT)
Dept: GASTROENTEROLOGY | Age: 51
End: 2021-04-30

## 2021-04-30 ENCOUNTER — VIRTUAL VISIT (OUTPATIENT)
Dept: GASTROENTEROLOGY | Age: 51
End: 2021-04-30

## 2021-04-30 DIAGNOSIS — K21.9 GASTROESOPHAGEAL REFLUX DISEASE WITHOUT ESOPHAGITIS: ICD-10-CM

## 2021-04-30 DIAGNOSIS — R13.19 ESOPHAGEAL DYSPHAGIA: Primary | ICD-10-CM

## 2021-04-30 PROCEDURE — VIRTUALHLTH VIRTUAL HEALTH SAME DAY: Performed by: INTERNAL MEDICINE

## 2021-04-30 PROCEDURE — 99213 OFFICE O/P EST LOW 20 MIN: CPT | Performed by: INTERNAL MEDICINE

## 2021-04-30 RX ORDER — OMEPRAZOLE 40 MG/1
40 CAPSULE, DELAYED RELEASE ORAL 2 TIMES DAILY
Qty: 60 CAPSULE | Refills: 2 | Status: SHIPPED | OUTPATIENT
Start: 2021-04-30 | End: 2021-06-30 | Stop reason: ALTCHOICE

## 2021-04-30 ASSESSMENT — ENCOUNTER SYMPTOMS
TROUBLE SWALLOWING: 1
BLOOD IN STOOL: 0
VOMITING: 0
RESPIRATORY NEGATIVE: 1
ABDOMINAL DISTENTION: 1
ALLERGIC/IMMUNOLOGIC NEGATIVE: 1
RECTAL PAIN: 0
EYES NEGATIVE: 1

## 2021-04-30 NOTE — TELEPHONE ENCOUNTER
Check out for VV 4/30/21. CT, GES, Swallow study ordered for patient. Writer called patient and she asked for orders to be mailed to her. RTC VV scheduled for 6/18/21 2pm.  No further testing ordered at this time and no co pay noted.

## 2021-04-30 NOTE — PROGRESS NOTES
intraluminal lesions that were obvious. Stomach again was filled with partially digested food suggestive of gastroparesis versus nonadherence to dietary instructions versus medication induced poor contractility    Currently the patient reports a difficulty initiating swallowing in the oropharyngeal region. Does not report any globus sensation but does not report any esophageal symptoms    Recommendation:  We will obtain CT chest with IV and oral contrast to evaluate at the midesophagus  To evaluate for any extraluminal causes of compression previous identified on esophagram.  Should this fail to disclose any obvious etiologies, will consider repeat EGD plus EUS  We will obtain gastric emptying test to evaluate for underlying gastroparesis  We will increase Prilosec to 40 mg twice daily  Sent for video swallow test and speech therapy follow-up for oral pharyngeal symptoms  Follow-up in 4 to 6 weeks. Once upper GI symptoms are noted to be improving and condyloma is appropriately treated for, will plan for colonoscopy for removal of residual polyps identified. Recent EGD on 4/5/21  IMPRESSION:  1) No obvious intraluminal lesions, strictures or masses. Tertiary contractions observed during procedure. Normal appearing mucosa, small angulation in the mid-esophagus at 25 cm, presumably extraluminal compression, subtle. Cold biopsy taken at this site  2) Large amount of undigested food identified in the stomach suggesting poor gastric motility or non-adherence to dietary instructions        I affirm this is a Patient Initiated Episode with an Established Patient who has not had a related appointment within my department in the past 7 days or scheduled within the next 24 hours. Total Time: minutes: 11-20 minutes    Erendira Almanzar is a 45 y.o. female being evaluated by a Virtual Visit (telephone visit) encounter to address concerns as mentioned above. A caregiver was present when appropriate.  Due to this being a TeleHealth encounter (During OMWLX-46 public health emergency), evaluation of the following organ systems was limited: Vitals/Constitutional/EENT/Resp/CV/GI//MS/Neuro/Skin/Heme-Lymph-Imm. Pursuant to the emergency declaration under the [address] [place] [address], [address] [place] [address] and the [employer] Act, this Virtual Visit was conducted with patient's (and/or legal guardian's) consent, to reduce the patient's risk of exposure to COVID-19 and provide necessary medical care. The patient (and/or legal guardian) has also been advised to contact this office for worsening conditions or problems, and seek emergency medical treatment and/or call 911 if deemed necessary. Services were provided through a telephone synchronous discussion virtually to substitute for in-person clinic visit. Patient and provider were located at their individual homes. --Radha Dickerson MD on 4/30/2021 at 11:55 AM    An electronic signature was used to authenticate this note.        [address] [facility]

## 2021-05-03 ENCOUNTER — TELEMEDICINE (OUTPATIENT)
Dept: PSYCHOLOGY | Age: 51
End: 2021-05-03
Payer: MEDICARE

## 2021-05-03 DIAGNOSIS — F33.1 MAJOR DEPRESSIVE DISORDER, RECURRENT EPISODE, MODERATE WITH ANXIOUS DISTRESS (HCC): Primary | ICD-10-CM

## 2021-05-03 PROCEDURE — 90832 PSYTX W PT 30 MINUTES: CPT | Performed by: PSYCHOLOGIST

## 2021-05-03 NOTE — PROGRESS NOTES
pt identifying one particular value (\"caring\" for others and herself). Psychoeducation on emotions was provided to identify pt's current feelings. Feelings chart and youtube video on values was provided. Pt's feelings about treatment were discussed as well; pt stated preference for staying in primary care behavioral health rather than transferring out to the community at this time. Previous Recommendations:   1. Pt will utilize grounding in response to unhelpful thoughts (e.g., \"go to bed\" in middle of day). Pt will also practice sleep hygiene (avoiding bed and naps during day). 2. Pt will continue to engage in enjoyable activities (color, listen to podcasts and music, practice gratitude, cook), will attend another AA meeting. 3. Next session in 2 weeks: will review and continue activity scheduling and psychoeducation on depression, continue to discuss emotions related to current and previous life stressors       MENTAL STATUS EXAM  Mood was sad with tearful affect. Suicidal ideation was denied. Homicidal ideation was denied. Hygiene was not assessed. Dress was not assessed. Behavior was not assessed with No observation of difficulties ambulating. Attitude was Engageable, Friendly and Help-seeking. Eye-contact was not assessed. Speech: rate - WNL, rhythm - WNL, volume - WNL. Verbalizations were goal directed and coherent. Thought processes were intact and goal-oriented without evidence of delusions, hallucinations, obsessions, or nathaniel; with moderate cognitive distortions. Associations were characterized by intact cognitive processes. Pt was oriented to person, place, time, and general circumstances;  recent:  good and remote:  good. Insight and judgment were estimated to be fair, AEB, a fair  understanding of cyclical maladaptive patterns, and the ability to use insight to inform behavior change.        ASSESSMENT  Freddy Pickard presented to the appointment today for f/u of symptoms of depression and anxiety, including anhedonia, low mood, fatigue and low energy, sleep difficulties, concentration difficulties, low self-worth, feelings of nervousness and dread, persistent worry about specific stressors, and restlessness. Pt is currrently in the process of establishing social security disability, and has significant financial stressors due to unemployment. She is currently deemed low risk to herself or others. She meets criteria for Major Depressive Disorder, Recurrent, Current episode moderate, with anxious distress. Pt's goals for treatment include processing challenging stressors/historical situations and improving her self-care and coping strategies. Tx approach includes emotion processing, behavioral activation and self-care emphasis, values clarification, and possibly stress management. Soto Pinedo was in agreement with recommendations. PHQ Scores 2/5/2021 12/18/2018   PHQ2 Score 4 0   PHQ9 Score 13 0     Interpretation of Total Score Depression Severity: 1-4 = Minimal depression, 5-9 = Mild depression, 10-14 = Moderate depression, 15-19 = Moderately severe depression, 20-27 = Severe depression    How often pt has had thoughts of death or hurting self (if PHQ positive for depression):       AMAN 7 SCORE 2/8/2021   AMAN-7 Total Score 14     Interpretation of AMAN-7 score: 5-9 = mild anxiety, 10-14 = moderate anxiety, 15+ = severe anxiety. Recommend referral to behavioral health for scores 10 or greater. DIAGNOSIS  Soto Pinedo was seen today for depression and anxiety.     Diagnoses and all orders for this visit:    Major depressive disorder, recurrent episode, moderate with anxious distress ([facility]      INTERVENTION  Discussed and set plan for behavioral activation, Discussed self-care (sleep, nutrition, rewarding activities, social support, exercise), Established rapport, [place]-setting to identify pt's primary goals for [facility] visit / overall health, Supportive techniques, Emphasized self-care as important for managing overall health, Provided Psychoeducation re: emotions and values and Collaboratively set goals with pt re: valued action, Values clarification      PLAN  1. Pt will continue practicing sleep hygiene and engaging in pleasurable activities. 2. Pt will act on value of creativity by making something that describes her feelings about changing the way she lives her life. 3. Next session in 1 week: will review exercise, continue values clarification and add related committed action to pleasurable activity schedule, continue psychoeducation on emotions       INTERACTIVE COMPLEXITY  Is interactive complexity present?   No  Reason:  N/A  Additional Supporting Information:  N/A

## 2021-05-04 ENCOUNTER — OFFICE VISIT (OUTPATIENT)
Dept: NEUROLOGY | Age: 51
End: 2021-05-04
Payer: MEDICARE

## 2021-05-04 VITALS
BODY MASS INDEX: 23.03 KG/M2 | HEART RATE: 83 BPM | SYSTOLIC BLOOD PRESSURE: 155 MMHG | DIASTOLIC BLOOD PRESSURE: 89 MMHG | WEIGHT: 138.4 LBS

## 2021-05-04 DIAGNOSIS — G60.9 IDIOPATHIC PERIPHERAL NEUROPATHY: ICD-10-CM

## 2021-05-04 DIAGNOSIS — G62.9 PERIPHERAL POLYNEUROPATHY: ICD-10-CM

## 2021-05-04 DIAGNOSIS — Z86.69 HISTORY OF PETIT-MAL SEIZURES: ICD-10-CM

## 2021-05-04 DIAGNOSIS — Z85.841 PERSONAL HISTORY OF ASTROCYTOMA: ICD-10-CM

## 2021-05-04 DIAGNOSIS — G44.211 INTRACTABLE EPISODIC TENSION-TYPE HEADACHE: ICD-10-CM

## 2021-05-04 DIAGNOSIS — G89.29 CHRONIC PERIPHERAL NEUROPATHIC PAIN: Primary | ICD-10-CM

## 2021-05-04 DIAGNOSIS — F10.11 HISTORY OF ALCOHOL ABUSE: ICD-10-CM

## 2021-05-04 DIAGNOSIS — M79.2 CHRONIC PERIPHERAL NEUROPATHIC PAIN: Primary | ICD-10-CM

## 2021-05-04 DIAGNOSIS — M54.10 RADICULOPATHY AFFECTING UPPER EXTREMITY: ICD-10-CM

## 2021-05-04 PROCEDURE — G8427 DOCREV CUR MEDS BY ELIG CLIN: HCPCS | Performed by: STUDENT IN AN ORGANIZED HEALTH CARE EDUCATION/TRAINING PROGRAM

## 2021-05-04 PROCEDURE — 4004F PT TOBACCO SCREEN RCVD TLK: CPT | Performed by: STUDENT IN AN ORGANIZED HEALTH CARE EDUCATION/TRAINING PROGRAM

## 2021-05-04 PROCEDURE — 99215 OFFICE O/P EST HI 40 MIN: CPT | Performed by: STUDENT IN AN ORGANIZED HEALTH CARE EDUCATION/TRAINING PROGRAM

## 2021-05-04 PROCEDURE — G8420 CALC BMI NORM PARAMETERS: HCPCS | Performed by: STUDENT IN AN ORGANIZED HEALTH CARE EDUCATION/TRAINING PROGRAM

## 2021-05-04 PROCEDURE — 3017F COLORECTAL CA SCREEN DOC REV: CPT | Performed by: STUDENT IN AN ORGANIZED HEALTH CARE EDUCATION/TRAINING PROGRAM

## 2021-05-04 RX ORDER — LACTOBACILLUS ACIDOPHILUS/PECT 30 MG-20MG
250 TABLET ORAL DAILY
Qty: 180 TABLET | Refills: 1 | Status: SHIPPED | OUTPATIENT
Start: 2021-05-04 | End: 2021-07-13 | Stop reason: ALTCHOICE

## 2021-05-04 RX ORDER — PREGABALIN 200 MG/1
200 CAPSULE ORAL 3 TIMES DAILY
Qty: 90 CAPSULE | Refills: 1 | Status: SHIPPED | OUTPATIENT
Start: 2021-05-04 | End: 2021-07-02

## 2021-05-04 ASSESSMENT — ENCOUNTER SYMPTOMS
NAUSEA: 0
BACK PAIN: 1
CONSTIPATION: 0
RESPIRATORY NEGATIVE: 1
BLOOD IN STOOL: 0
DIARRHEA: 0
VOMITING: 0
EYES NEGATIVE: 1

## 2021-05-04 NOTE — PROGRESS NOTES
[place] [address] [facility]  Dept: [phone]  Dept Fax: [phone]    NEUROLOGY FOLLOW UP NOTE                                              PATIENT NAME: Luis M Suazo   PATIENT MRN: [id number]  FOLLOW UP TODAY: 5/4/2021        INITIAL & INTERVAL HISTORY:     Luis M Suazo is a 45 y.o. female here for follow up. She's been managed for peripheral neuropathy. She also has history of epilepsy, and CRPS     Peripheral neuropathy: Patient reports that she has been having pain and numbness for  the last 9 months. Pain involves her lower extremity more often that her upper extremities. Pain is electrical like, however, she does get soreness when she walks for long distances which lasts for 1 to 2 days. She also reports that she hasn't been feeling her feet. She has Hx of CRPS on left ankle since 2014 and she now feel the same symptoms on the right side. She was on gabapentin 300 mg TID when she presented to our clinic. She was switched to Lyrica 150 mg TID without significant improvement. Today, she reports that the pain is continuous, 5/10, worsens by walking and goes up to 8/10. She has significant history of alcohol use but she has been sober since March 2021. She recently started feeling gait unsteadiness. EMG is scheduled on June 1st.      Restless leg syndrome:  She has a previous diagnosis of restless legt syndrome and is on requip 1 mg HS for 10 months. Epilepsy: She's on tegretol 200 mg TID for seizures. Last seizure was reported to be last September 2020. No seizures since then. Seizure history: diagnosed with petite mal seizure as child. Previously on dilantin. No recent seizures. Last seizure event was around 1 year ago. She continues Tegretol 200 mg TID. She feels generalized fatigability. No dizziness. Gait unsteadiness: . Back pain radiating to bilateral lower extremities. Sometimes cramps and sometimes sharp pain. Mostly 5/10. Also complains of neck pain, cramping, sometimes going to her hands. She continues to feel numbness of her feet bilaterally. Sleep issues: She has difficulty sleeping. And was started on trazodone by her psychiatrist. It helps her sleep but she does have interrupted sleep. She is about to schedule a sleep study that was ordered by her psychiatrist     Headache: She complains of headache that she gets once every one or two week. It's usually mild to medium and resolves with tylenol or Advil. 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 Previous work up:   MRI brain w/o on 10/28/2019: Stable nonspecific T2 lengthening right parietal white matter. No acute disease. PMH    has a past medical history of Acute respiratory failure with hypoxia ([address] Alcohol withdrawal syndrome, with delirium ([address] Alcoholism ([address] Anemia, 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24, the patient underwent 2 surgical resections without known recurrence, Closed fracture of lateral portion of left tibial plateau, COPD (chronic obstructive pulmonary disease) ([address] Depression, Dysphagia, GI bleed, Hypertension, Memory loss, Oxygen dependent, Pain, joint, ankle and foot, Pancreatic lesion, and Seizures ([address] PSH/SH/FMH: Remain unchanged since last visit 4/6/2021.     ALLERGIES:   No Known Allergies    MEDICATIONS:   Current Outpatient Medications   Medication Sig Dispense Refill    omeprazole (PRILOSEC) 40 MG delayed release capsule Take 1 capsule by mouth 2 times daily 60 capsule 2    ferrous sulfate (IRON 325) 325 (65 Fe) MG tablet Take 1 tablet by mouth 2 times daily 180 tablet 1    sertraline (ZOLOFT) 100 MG tablet Take 1.5 tablets by mouth daily 45 tablet 0    traZODone (DESYREL) 50 MG tablet Take 1.5 tablets by mouth nightly as needed for Sleep 45 tablet 0    acamprosate (CAMPRAL) 333 MG tablet Take 2 tablets by mouth 3 times daily 180 tablet 0    rOPINIRole (REQUIP) 1 MG tablet Take 1 tablet by mouth nightly 90 tablet 1    budesonide-formoterol (SYMBICORT) 160-4.5 MCG/ACT AERO Inhale 2 puffs into the lungs 2 times daily 3 Inhaler 1    tiZANidine (ZANAFLEX) 4 MG tablet Take 1 tablet by mouth every 8 hours as needed (back pain) 30 tablet 3    hydrOXYzine (ATARAX) 50 MG tablet TAKE ONE TABLET BY MOUTH THREE TIMES A DAY 90 tablet 3    pyridoxine (RA VITAMIN B-6) 50 MG tablet Take 2 tablets by mouth daily 180 tablet 1    tiotropium (SPIRIVA RESPIMAT) 2.5 MCG/ACT AERS inhaler Inhale 2 puffs into the lungs daily 1 Inhaler 11    amLODIPine (NORVASC) 5 MG tablet TAKE ONE TABLET BY MOUTH DAILY 90 tablet 0    carBAMazepine (TEGRETOL XR) 200 MG extended release tablet TAKE ONE TABLET BY MOUTH THREE TIMES A DAY 90 tablet 2    pregabalin (LYRICA) 150 MG capsule Take 1 capsule by mouth 3 times daily for 90 days. 90 capsule 2    celecoxib (CELEBREX) 100 MG capsule Take 1 capsule by mouth daily 30 capsule 3    sodium chloride (ALTAMIST SPRAY) 0.65 % nasal spray 1 spray by Nasal route as needed for Congestion 1 Bottle 3    potassium chloride (KLOR-CON M) 20 MEQ extended release tablet Take 1 tablet by mouth daily 30 tablet 5    ACETAMINOPHEN EXTRA STRENGTH 500 MG tablet Take 500 mg by mouth 3 times daily as needed      albuterol sulfate HFA (VENTOLIN HFA) 108 (90 Base) MCG/ACT inhaler Inhale 2 puffs into the lungs 4 times daily as needed for Wheezing 1 Inhaler 5    vitamin B-1 (THIAMINE) 100 MG tablet Take 1 tablet by mouth daily 30 tablet 3    vitamin D (ERGOCALCIFEROL) 73210 units CAPS capsule Take 1 capsule by mouth once a week 12 capsule 1    folic acid (FOLVITE) 1 MG tablet Take 1 tablet by mouth daily 90 tablet 1    propranolol (INDERAL) 40 MG tablet Take 40 mg by mouth 2 times daily       No current facility-administered medications for this visit.            LABS & TESTS:      Lab Results   Component Value Date    WBC 7.0 12/23/2020 HGB 13.4 12/23/2020    HCT 40.1 12/23/2020    MCV 96.1 12/23/2020     (H) 12/23/2020       REVIEW OF SYSTEMS:     Review of Systems   Constitutional: Negative. HENT: Negative. Eyes: Negative. Respiratory: Negative. Cardiovascular: Negative. Gastrointestinal: Negative for blood in stool, constipation, diarrhea, nausea and vomiting. Endocrine: Negative. Musculoskeletal: Positive for back pain, gait problem and neck pain. Skin: Negative. Neurological: Positive for weakness and headaches. Hematological: Negative. Psychiatric/Behavioral: Positive for sleep disturbance. Negative for self-injury and suicidal ideas. VITALS  BP (!) 159/102 (Site: Right Upper Arm, Position: Sitting, Cuff Size: Medium Adult)   Pulse 83   Wt 138 lb 6.4 oz (62.8 kg) Comment: actual  BMI 23.03 kg/m²     PHYSICAL EXAMINATION:     Physical Exam  Constitutional:       Appearance: Normal appearance. HENT:      Head: Normocephalic and atraumatic. Eyes:      General: No visual field deficit. Extraocular Movements: Extraocular movements intact. Pupils: Pupils are equal, round, and reactive to light. Neck:      Musculoskeletal: Normal range of motion and neck supple. Cardiovascular:      Rate and Rhythm: Normal rate and regular rhythm. Pulmonary:      Effort: Pulmonary effort is normal.      Breath sounds: Normal breath sounds. Abdominal:      General: Abdomen is flat. Palpations: Abdomen is soft. Neurological:      Mental Status: She is alert and oriented to person, place, and time. GCS: GCS eye subscore is 4. GCS verbal subscore is 5. GCS motor subscore is 6. Cranial Nerves: Cranial nerves are intact. No cranial nerve deficit, dysarthria or facial asymmetry. Deep Tendon Reflexes: Strength normal. Babinski sign absent on the right side. Babinski sign absent on the left side.       Reflex Scores:       Tricep reflexes are 2+ on the right side and 2+ on the left achilles: 2+  Left achilles: 2+  Right plantar: normal  Left plantar: normal  Right Freedman: absent  Left Freedman: absent  Right ankle clonus: absent  Left ankle clonus: absent  Right pendular knee jerk: absent  Left pendular knee jerk: absent    Mini-Mental Status examination  Date orientation  5/5  Place orientation  5/5  [place] 3 objects 3/3  Serial sevens or backward spelling  5/5  Recall 3 objects 2/3  Naming 2/2  Repeating a phrase  1/1  Verbal commands 3/3  Written commands  1/1  Writing 1/1  Drawing 1/1  Total score 29/30    d    Work up :      BRADLEY negative   ANCA negative   A1C 5.5   Vitamin B6 13.6 (low)  Vitamin B1 140   B12 699  FA >20.0  Vitamin E WNL   ESR 37  CRP 12.1   SPEP WNL   Cryoglobulin 0    ASSESSMENT / PLAN:      1. Peripheral neuropathy. Length dependent, symmetrical, with allodynia   Increase B6 to 250 mg daily. Increase Lyrica to 200 mg TID. Follow up EMG/NCS   DC Celebrex   2. Vitmain B 6 deficiency   Increase B6 to 250 mg daily   Re-check level in 3 to 6 months   3. History of alcohol abuse   Counseled about the importance of abstinence   4. Gait unsteadiness   Due to peripheral neuropathy. No ataxia on exam  Wide base gait but stable without assistance   Will consider referral for PT/OT if continues to worsen   5. Epilepsy   Continue Tegretol 200 mg BID  6. Pseudo-dementia   Continue management of depression by psychiatry   7. Tension headache   Continue Tylenol, advil PRN     Ms. Tamra Guadarrama received counseling on the following healthy behaviors: medical compliance, smoking cessation, blood pressure control, regular follow up with primary doctor.       Kerry Sena MD  PGY-4, Neurology     Electronically signed by John Spence MD on 5/4/2021 at 4:18 PM

## 2021-05-05 DIAGNOSIS — I10 ESSENTIAL HYPERTENSION: ICD-10-CM

## 2021-05-05 NOTE — TELEPHONE ENCOUNTER
Last visit: 03/31/2021  Last Med refill: 02/08/2021  Does patient have enough medication for 72 hours: No:     Next Visit Date:  Future Appointments   Date Time Provider Lexi Castillo   5/10/2021  1:00 PM [id number]   5/17/2021  3:00 PM Christina [facility]enee APRN - NP [facility]   6/1/2021  9:30 AM [facility] EMG  [facility] EMG [address] [facility]   6/1/2021  9:30 AM Gay Snow MD [address]   6/18/2021  2:00 PM Tr Guevara MD sv [address] [facility]ks TOLP   7/6/2021  1:30 PM Gio Lazcano MD [facility]   8/2/2021  1:00 PM Ludivina Leon  [address]   Topic Date Due    Cervical cancer screen  Never done    Shingles Vaccine (1 of 2) Never done    COVID-19 Vaccine (2 - Pfizer 2-dose series) 04/16/2021    Breast cancer screen  11/06/2021    Potassium monitoring  12/23/2021    Creatinine monitoring  02/12/2022    Lipid screen  12/23/2025    Colon cancer screen colonoscopy  10/22/2030    DTaP/Tdap/Td vaccine (2 - Td) 12/28/2030    Flu vaccine  Completed    Pneumococcal 0-64 years Vaccine  Completed    Hepatitis C screen  Completed    HIV screen  Completed    Hepatitis A vaccine  Aged Out    Hepatitis B vaccine  Aged Out    Hib vaccine  Aged Out    Meningococcal (ACWY) vaccine  Aged Out       Hemoglobin A1C (%)   Date Value   04/13/2021 5.5   07/14/2019 4.3             ( goal A1C is < 7)   No results found for: LABMICR  LDL Cholesterol (mg/dL)   Date Value   12/23/2020 134 (H)   06/18/2019 92       (goal LDL is <100)   AST (U/L)   Date Value   12/23/2020 24     ALT (U/L)   Date Value   12/23/2020 7     BUN (mg/dL)   Date Value   12/23/2020 5 (L)     BP Readings from Last 3 Encounters:   05/04/21 (!) 155/89   04/05/21 (!) 143/90   04/05/21 104/67          (goal 120/80)    All Future Testing planned in [facility]  Lab Frequency Next Occurrence   CBC With Auto Differential Once 11/02/2020   Hepatic Function Panel Once 11/02/2020 Cancer Antigen 19-9 Once 11/11/2020   EUS Once 12/11/2020   CBC Auto Differential Once 31/73/7565   Basic Metabolic Panel Once 73/95/7028   Hepatic Function Panel Once 11/11/2020   Cancer Antigen 19-9 Once 02/12/2021   EGD Once 03/30/2021   EMG Once 07/12/2021   Baseline Diagnostic Sleep Study Once 04/19/2021   NM GASTRIC EMPTYING Once 04/30/2021   SLP videofluoroscopic swallow study Once 07/31/2021   CT CHEST W CONTRAST Once 04/30/2021               Patient Active Problem List:     Acute bronchitis     Reflex sympathetic dystrophy of lower limb     Essential hypertension     Nicotine addiction     Psychophysiological insomnia     Anxiety     Alcoholic peripheral neuropathy (HCC)     Hypokalemia     Macrocytic anemia     Hypomagnesemia     Tobacco use     Ambulatory dysfunction     Seizures (HCC)     Paresthesia of lower extremity     Pancreatic cyst     Recurrent major depressive disorder (HCC)     Elevated CA 19-9 level     Perineal mass, female     Esophagitis candidal      Condyloma     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24, the patient underwent 2 surgical resections without known recurrence     Alcohol use disorder, severe, in early remission ([address]     Acute metabolic encephalopathy     AMAN (generalized anxiety disorder)     Major depressive disorder, recurrent severe without psychotic features ([address]     Esophageal dysphagia     Chronic peripheral neuropathic pain     History of alcohol abuse     History of petit-mal seizures     Intractable episodic tension-type headache     Personal history of astrocytoma

## 2021-05-06 RX ORDER — AMLODIPINE BESYLATE 5 MG/1
TABLET ORAL
Qty: 90 TABLET | Refills: 0 | Status: SHIPPED | OUTPATIENT
Start: 2021-05-06 | End: 2021-06-03

## 2021-05-10 ENCOUNTER — TELEMEDICINE (OUTPATIENT)
Dept: PSYCHOLOGY | Age: 51
End: 2021-05-10
Payer: MEDICARE

## 2021-05-10 DIAGNOSIS — F33.1 MAJOR DEPRESSIVE DISORDER, RECURRENT EPISODE, MODERATE WITH ANXIOUS DISTRESS (HCC): Primary | ICD-10-CM

## 2021-05-10 PROCEDURE — 90832 PSYTX W PT 30 MINUTES: CPT | Performed by: PSYCHOLOGIST

## 2021-05-10 PROCEDURE — 4004F PT TOBACCO SCREEN RCVD TLK: CPT | Performed by: PSYCHOLOGIST

## 2021-05-10 NOTE — PROGRESS NOTES
Sadie Crigler, M.A. Psychology Doctoral Trainee    Supervising Clinical Psychologists:  Stacy Ann, Ph.D. Elza Felipe,  Ph.D. [id number]        Visit Date: 5/10/2021   Time of appointment:  1:04PM - 1:40PM  Time spent with Patient: 36 minutes. This is patient's seventh appointment. Reason for Consult:  Depression and Anxiety     Referring Provider/PCP:    No ref. provider found  Juan Manuel Kohli MD      Pt provided informed consent for the behavioral health program. Discussed with patient model of service to include the limits of confidentiality (i.e. abuse reporting, suicide intervention, etc.) and short-term intervention focused approach. Pt indicated understanding. Pt provided verbal consent to engage in telehealth psychotherapy. This visit was completed virtually using doxy. me due to contact restrictions related to the COVID-19 pandemic. Session was held in a private place (pt's home). Because of the virtual delivery method, certain behavioral observations were not assessed during this appointment.      Pt provided verbal consent for the following emergency contact: Brissa Khan (mother) at [phone].     [facility]/Clinician location: [facility] in [place], [place]. Jeri Cunningham is a 45 y.o. female who presents for follow up of depression and anxiety symptoms, including low mood, anhedonia, fatigue and low energy, sleep difficulties, concentration difficulties, low self-worth, feelings of nervousness and dread, persistent worries about specific stressors, and restlessness. Session activities included review of emotional acceptance exercise, behavioral activation, and values clarification. Pt's experience creating art based on the idea of changing her behaviors to be more in line with values was explored. Additional clarified pt value included connecting with others.   Pros and cons of building relationships with new people were discussed. Pt's engagement in behavioral activation was also reviewed, with pt noting she feels there are some days where her body needs rest due to the pain; the idea of adjusting actions to be in line with values while respecting her body's limits was explored. Referrals for community providers for long-term therapeutic relationships were provided via email. Previous Recommendations:   1. Pt will continue practicing sleep hygiene and engaging in pleasurable activities. 2. Pt will act on value of creativity by making something that describes her feelings about changing the way she lives her life. 3. Next session in 1 week: will review exercise, continue values clarification and add related committed action to pleasurable activity schedule, continue psychoeducation on emotions         MENTAL STATUS EXAM  Mood was sad with calm, tearful affect. Suicidal ideation was denied. Homicidal ideation was denied. Hygiene was not assessed. Dress was not assessed. Behavior was not assessed with No observation of difficulties ambulating. Attitude was Engageable, Friendly and Help-seeking. Eye-contact was not assessed. Speech: rate - WNL, rhythm - WNL, volume - WNL. Verbalizations were goal directed and coherent. Thought processes were intact and goal-oriented without evidence of delusions, hallucinations, obsessions, or nathaniel; with moderate cognitive distortions. Associations were characterized by intact cognitive processes. Pt was oriented to person, place, time, and general circumstances;  recent:  good and remote:  good. Insight and judgment were estimated to be fair, AEB, a fair  understanding of cyclical maladaptive patterns, and the ability to use insight to inform behavior change.        ASSESSMENT  Pancho Childers presented to the appointment today for f/u of symptoms of depression and anxiety, including anhedonia, low mood, fatigue and low energy, sleep difficulties,

## 2021-05-14 ENCOUNTER — TELEPHONE (OUTPATIENT)
Dept: GASTROENTEROLOGY | Age: 51
End: 2021-05-14

## 2021-05-14 DIAGNOSIS — K21.9 GASTROESOPHAGEAL REFLUX DISEASE WITHOUT ESOPHAGITIS: ICD-10-CM

## 2021-05-14 DIAGNOSIS — R13.19 ESOPHAGEAL DYSPHAGIA: Primary | ICD-10-CM

## 2021-05-14 NOTE — TELEPHONE ENCOUNTER
Received request per [facility] radiology to change order to [address] Writer placing new order per .

## 2021-05-17 ENCOUNTER — TELEMEDICINE (OUTPATIENT)
Dept: PSYCHIATRY | Age: 51
End: 2021-05-17
Payer: MEDICARE

## 2021-05-17 DIAGNOSIS — F41.1 GAD (GENERALIZED ANXIETY DISORDER): ICD-10-CM

## 2021-05-17 DIAGNOSIS — F32.9 MAJOR DEPRESSIVE DISORDER, REMISSION STATUS UNSPECIFIED, UNSPECIFIED WHETHER RECURRENT: Primary | ICD-10-CM

## 2021-05-17 DIAGNOSIS — F10.21 ALCOHOL USE DISORDER, SEVERE, IN EARLY REMISSION (HCC): ICD-10-CM

## 2021-05-17 PROCEDURE — 99214 OFFICE O/P EST MOD 30 MIN: CPT | Performed by: NURSE PRACTITIONER

## 2021-05-17 RX ORDER — TRAZODONE HYDROCHLORIDE 50 MG/1
75 TABLET ORAL NIGHTLY PRN
Qty: 45 TABLET | Refills: 0 | Status: SHIPPED | OUTPATIENT
Start: 2021-05-17 | End: 2021-05-20

## 2021-05-17 RX ORDER — ACAMPROSATE CALCIUM 333 MG/1
666 TABLET, DELAYED RELEASE ORAL 3 TIMES DAILY
Qty: 180 TABLET | Refills: 0 | Status: SHIPPED | OUTPATIENT
Start: 2021-05-17 | End: 2021-07-16 | Stop reason: SDUPTHER

## 2021-05-17 RX ORDER — SERTRALINE HYDROCHLORIDE 100 MG/1
200 TABLET, FILM COATED ORAL DAILY
Qty: 60 TABLET | Refills: 0 | Status: SHIPPED | OUTPATIENT
Start: 2021-05-17 | End: 2021-08-05
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
## 2021-05-19 ENCOUNTER — HOSPITAL ENCOUNTER (OUTPATIENT)
Dept: SLEEP CENTER | Age: 51
Discharge: HOME | End: 2021-05-21
Payer: MEDICARE

## 2021-05-19 ENCOUNTER — HOSPITAL ENCOUNTER (OUTPATIENT)
Dept: NUCLEAR MEDICINE | Age: 51
Discharge: HOME | End: 2021-05-21
Payer: MEDICARE

## 2021-05-19 DIAGNOSIS — F32.9 MAJOR DEPRESSIVE DISORDER, REMISSION STATUS UNSPECIFIED, UNSPECIFIED WHETHER RECURRENT: ICD-10-CM

## 2021-05-19 DIAGNOSIS — F10.21 ALCOHOL USE DISORDER, SEVERE, IN EARLY REMISSION (HCC): ICD-10-CM

## 2021-05-19 DIAGNOSIS — K21.9 GASTROESOPHAGEAL REFLUX DISEASE WITHOUT ESOPHAGITIS: ICD-10-CM

## 2021-05-19 DIAGNOSIS — F41.1 GAD (GENERALIZED ANXIETY DISORDER): ICD-10-CM

## 2021-05-19 PROCEDURE — 95810 POLYSOM 6/> YRS 4/> PARAM: CPT

## 2021-05-19 PROCEDURE — 3430000000 HC RX DIAGNOSTIC RADIOPHARMACEUTICAL: Performed by: INTERNAL MEDICINE

## 2021-05-19 PROCEDURE — 78264 GASTRIC EMPTYING IMG STUDY: CPT

## 2021-05-19 PROCEDURE — A9541 TC99M SULFUR COLLOID: HCPCS | Performed by: INTERNAL MEDICINE

## 2021-05-19 RX ADMIN — Medication 1 MILLICURIE: at 09:00

## 2021-05-20 ENCOUNTER — HOSPITAL ENCOUNTER (OUTPATIENT)
Dept: LAB | Age: 51
Setting detail: SPECIMEN
Discharge: HOME | End: 2021-05-20
Payer: MEDICARE

## 2021-05-20 VITALS
HEIGHT: 65 IN | HEART RATE: 80 BPM | BODY MASS INDEX: 22.99 KG/M2 | WEIGHT: 138 LBS | OXYGEN SATURATION: 92 % | RESPIRATION RATE: 18 BRPM

## 2021-05-20 DIAGNOSIS — Z20.822 COVID-19 RULED OUT BY LABORATORY TESTING: Primary | ICD-10-CM

## 2021-05-20 DIAGNOSIS — G40.909 SEIZURE DISORDER (HCC): ICD-10-CM

## 2021-05-20 DIAGNOSIS — F33.1 MODERATE EPISODE OF RECURRENT MAJOR DEPRESSIVE DISORDER (HCC): Primary | ICD-10-CM

## 2021-05-20 PROCEDURE — U0005 INFEC AGEN DETEC AMPLI PROBE: HCPCS

## 2021-05-20 PROCEDURE — U0003 INFECTIOUS AGENT DETECTION BY NUCLEIC ACID (DNA OR RNA); SEVERE ACUTE RESPIRATORY SYNDROME CORONAVIRUS 2 (SARS-COV-2) (CORONAVIRUS DISEASE [COVID-19]), AMPLIFIED PROBE TECHNIQUE, MAKING USE OF HIGH THROUGHPUT TECHNOLOGIES AS DESCRIBED BY CMS-2020-01-R: HCPCS

## 2021-05-20 RX ORDER — TRAZODONE HYDROCHLORIDE 50 MG/1
TABLET ORAL
Qty: 45 TABLET | Refills: 0 | Status: SHIPPED | OUTPATIENT
Start: 2021-05-20 | End: 2021-07-16 | Stop reason: SDUPTHER

## 2021-05-20 ASSESSMENT — SLEEP AND FATIGUE QUESTIONNAIRES
HOW LIKELY ARE YOU TO NOD OFF OR FALL ASLEEP WHILE SITTING AND TALKING TO SOMEONE: 0
ESS TOTAL SCORE: 10
HOW LIKELY ARE YOU TO NOD OFF OR FALL ASLEEP WHEN YOU ARE A PASSENGER IN A CAR FOR AN HOUR WITHOUT A BREAK: 2

## 2021-05-20 NOTE — TELEPHONE ENCOUNTER
Last visit: 3/31/21  Last Med refill: 2/8/21  Does patient have enough medication for 72 hours: No    Next Visit Date:  Future Appointments   Date Time Provider Lexi Castillo   5/20/2021  3:15 PM SCHEDULE, ST[facility] COVID SCREENING [facility] COV [facility]   5/24/2021  8:00 AM STV CT ROOM 1 STVZ CT SCAN [facility]   5/24/2021  8:30 AM STV GI RM 6 STVZ RAD [facility]iolog   5/24/2021  2:00 PM Pallavi Babu [place] [facility]   6/1/2021  9:30 AM STC EMG  STCZ EMG [place]   6/1/2021  9:30 AM Jay Kahn MD [place] [facility]   6/15/2021  1:30 PM LOI Shipman - NP PAZ TEL [id number]   6/18/2021  2:00 PM Magui Parson MD sv gr lks [facility]   7/6/2021  1:30 PM Juan Castorena MD Neuro Paulina Jewels [facility]   8/2/2021  1:00 PM Ludivina Simmons Do,  [address] Maintenance   Topic Date Due    Cervical cancer screen  Never done    Shingles Vaccine (1 of 2) Never done    COVID-19 Vaccine (2 - Pfizer 2-dose series) 04/16/2021    Breast cancer screen  11/06/2021    Potassium monitoring  12/23/2021    Creatinine monitoring  02/12/2022    Lipid screen  12/23/2025    Colon cancer screen colonoscopy  10/22/2030    DTaP/Tdap/Td vaccine (2 - Td) 12/28/2030    Pneumococcal 0-64 years Vaccine (2 of 2) 07/05/2034    Flu vaccine  Completed    Hepatitis C screen  Completed    HIV screen  Completed    Hepatitis A vaccine  Aged Out    Hepatitis B vaccine  Aged Out    Hib vaccine  Aged Out    Meningococcal (ACWY) vaccine  Aged Out       Hemoglobin A1C (%)   Date Value   04/13/2021 5.5   07/14/2019 4.3             ( goal A1C is < 7)   No results found for: LABMICR  LDL Cholesterol (mg/dL)   Date Value   12/23/2020 134 (H)   06/18/2019 92       (goal LDL is <100)   AST (U/L)   Date Value   12/23/2020 24     ALT (U/L)   Date Value   12/23/2020 7     BUN (mg/dL)   Date Value   12/23/2020 5 (L)     BP Readings from Last 3 Encounters:   05/04/21 (!) 155/89   04/05/21 (!) 143/90   04/05/21 104/67          (goal 120/80)    All Future Testing planned in [facility]  Lab Frequency Next Occurrence   CBC With Auto Differential Once 11/02/2020   Hepatic Function Panel Once 11/02/2020   Cancer Antigen 19-9 Once 11/11/2020   CBC Auto Differential Once 77/72/9531   Basic Metabolic Panel Once 18/02/8516   Hepatic Function Panel Once 11/11/2020   Cancer Antigen 19-9 Once 02/12/2021   EGD Once 03/30/2021   EMG Once 07/12/2021   SLP videofluoroscopic swallow study Once 07/31/2021   CT CHEST W CONTRAST Once 04/30/2021   FL MODIFIED BARIUM SWALLOW W VIDEO Once 05/14/2021   COVID-19 Once 05/19/2021               Patient Active Problem List:     Acute bronchitis     Reflex sympathetic dystrophy of lower limb     Essential hypertension     Nicotine addiction     Psychophysiological insomnia     Anxiety     Alcoholic peripheral neuropathy (HCC)     Hypokalemia     Macrocytic anemia     Hypomagnesemia     Tobacco use     Ambulatory dysfunction     Seizures (HCC)     Paresthesia of lower extremity     Pancreatic cyst     Recurrent major depressive disorder (HCC)     Elevated CA 19-9 level     Perineal mass, female     Esophagitis candidal      Condyloma     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24, the patient underwent 2 surgical resections without known recurrence     Alcohol use disorder, severe, in early remission ([address]     Acute metabolic encephalopathy     AMAN (generalized anxiety disorder)     Major depressive disorder, recurrent severe without psychotic features ([address]     Esophageal dysphagia     Chronic peripheral neuropathic pain     History of alcohol abuse     History of petit-mal seizures     Intractable episodic tension-type headache     Personal history of astrocytoma

## 2021-05-21 DIAGNOSIS — R13.19 ESOPHAGEAL DYSPHAGIA: Primary | ICD-10-CM

## 2021-05-21 LAB
SARS-COV-2: NORMAL
SARS-COV-2: NOT DETECTED
SOURCE: NORMAL

## 2021-05-21 RX ORDER — CARBAMAZEPINE 200 MG/1
TABLET, EXTENDED RELEASE ORAL
Qty: 90 TABLET | Refills: 2 | Status: SHIPPED | OUTPATIENT
Start: 2021-05-21 | End: 2021-07-06

## 2021-05-24 ENCOUNTER — HOSPITAL ENCOUNTER (OUTPATIENT)
Dept: CT IMAGING | Age: 51
Discharge: HOME | End: 2021-05-26
Payer: MEDICARE

## 2021-05-24 ENCOUNTER — TELEMEDICINE (OUTPATIENT)
Dept: PSYCHOLOGY | Age: 51
End: 2021-05-24
Payer: MEDICARE

## 2021-05-24 ENCOUNTER — HOSPITAL ENCOUNTER (OUTPATIENT)
Dept: GENERAL RADIOLOGY | Age: 51
Discharge: HOME | End: 2021-05-26
Payer: MEDICARE

## 2021-05-24 ENCOUNTER — HOSPITAL ENCOUNTER (OUTPATIENT)
Age: 51
Discharge: HOME | End: 2021-05-24
Payer: MEDICARE

## 2021-05-24 DIAGNOSIS — K21.9 GASTROESOPHAGEAL REFLUX DISEASE WITHOUT ESOPHAGITIS: ICD-10-CM

## 2021-05-24 DIAGNOSIS — R13.19 ESOPHAGEAL DYSPHAGIA: ICD-10-CM

## 2021-05-24 DIAGNOSIS — F33.1 MAJOR DEPRESSIVE DISORDER, RECURRENT EPISODE, MODERATE WITH ANXIOUS DISTRESS (HCC): Primary | ICD-10-CM

## 2021-05-24 DIAGNOSIS — R56.9 SEIZURES (HCC): Primary | ICD-10-CM

## 2021-05-24 LAB
GFR NON-AFRICAN AMERICAN: >60 ML/MIN
GFR SERPL CREATININE-BSD FRML MDRD: >60 ML/MIN
GFR SERPL CREATININE-BSD FRML MDRD: NORMAL ML/MIN/{1.73_M2}
POC CREATININE: 0.53 MG/DL (ref 0.51–1.19)

## 2021-05-24 PROCEDURE — 6360000004 HC RX CONTRAST MEDICATION: Performed by: INTERNAL MEDICINE

## 2021-05-24 PROCEDURE — 90834 PSYTX W PT 45 MINUTES: CPT | Performed by: PSYCHOLOGIST

## 2021-05-24 PROCEDURE — 82565 ASSAY OF CREATININE: CPT

## 2021-05-24 PROCEDURE — 4004F PT TOBACCO SCREEN RCVD TLK: CPT | Performed by: PSYCHOLOGIST

## 2021-05-24 PROCEDURE — 74230 X-RAY XM SWLNG FUNCJ C+: CPT

## 2021-05-24 PROCEDURE — 71260 CT THORAX DX C+: CPT

## 2021-05-24 PROCEDURE — 92611 MOTION FLUOROSCOPY/SWALLOW: CPT

## 2021-05-24 RX ADMIN — IOPAMIDOL 75 ML: 755 INJECTION, SOLUTION INTRAVENOUS at 09:30

## 2021-05-24 RX ADMIN — IOHEXOL 50 ML: 240 INJECTION, SOLUTION INTRATHECAL; INTRAVASCULAR; INTRAVENOUS; ORAL at 09:30

## 2021-05-24 NOTE — PROGRESS NOTES
Mckenzie Thomason M.A. Psychology Doctoral Trainee    Supervising Clinical Psychologists:  Tana Werner, Ph.D. David Santos,  Ph.D. Cordelia Luis        Visit Date: 5/24/2021   Time of appointment:  2:05PM - 2:46PM  Time spent with Patient: 41 minutes. This is patient's eighth appointment. Reason for Consult:  Depression and Anxiety     Referring Provider/PCP:    No ref. provider found  Cynthia Yee MD      Pt provided informed consent for the behavioral health program. Discussed with patient model of service to include the limits of confidentiality (i.e. abuse reporting, suicide intervention, etc.) and short-term intervention focused approach. Pt indicated understanding. Pt provided verbal consent to engage in telehealth psychotherapy. This visit was completed virtually using doxy. me due to contact restrictions related to the COVID-19 pandemic. Session was held in a private place (pt's home). Because of the virtual delivery method, certain behavioral observations were not assessed during this appointment.      Pt provided verbal consent for the following emergency contact: Carol Wright (mother) at [phone].     [facility]/Clinician location: [facility] in [place], [place]. Edgar José is a 45 y.o. female who presents for follow up of depression and anxiety symptoms, including low mood, anhedonia, fatigue and low energy, sleep difficulties, concentration difficulties, low self-worth, feelings of nervousness and dread, persistent worries about specific stressors, and restlessness. Session activities included review of skills learned, relapse prevention and treatment planning, and termination. Pt reported continued engagement in pleasurable activities, sleep hygiene, and self-care behaviors. Emotions related to a specific interpersonal challenge were identified and processed.   Skills learned in therapy were reviewed and emphasized as part of relapse prevention planning, and next steps for treatment were identified. Pt stated she plans to call a community behavioral health provider this week to schedule an intake appt and establish long-term care. Previous Recommendations:   1. Pt will continue practicing sleep hygiene and engaging in pleasurable activities. 2. Pt will explore referrals and set up intake appt to establish care in the community for long-term behavioral health support. 3. Next session in 1 week: will review behavioral activation, plan to establish care in the community and terminate current behavioral health service, and continue values clarification and emotional awareness practice        MENTAL STATUS EXAM  Mood was sad with calm, tearful affect. Suicidal ideation was denied. Homicidal ideation was denied. Hygiene was not assessed. Dress was not assessed. Behavior was not assessed with No observation of difficulties ambulating. Attitude was Engageable, Friendly and Help-seeking. Eye-contact was not assessed. Speech: rate - WNL, rhythm - WNL, volume - WNL. Verbalizations were goal directed and coherent. Thought processes were intact and goal-oriented without evidence of delusions, hallucinations, obsessions, or nathaniel; with moderate cognitive distortions. Associations were characterized by intact cognitive processes. Pt was oriented to person, place, time, and general circumstances;  recent:  good and remote:  good. Insight and judgment were estimated to be fair, AEB, a fair  understanding of cyclical maladaptive patterns, and the ability to use insight to inform behavior change.        ASSESSMENT  Erendira Almanzar presented to the appointment today for f/u of symptoms of depression and anxiety, including anhedonia, low mood, fatigue and low energy, sleep difficulties, concentration difficulties, low self-worth, feelings of nervousness and dread, persistent worry about specific stressors, and restlessness. Pt is still in the process of establishing social security disability, and has significant financial stressors due to unemployment. She is currently deemed low risk to herself or others. She meets criteria for Major Depressive Disorder, Recurrent, Current episode moderate, with anxious distress. Pt's goals for treatment include processing challenging stressors/historical situations and improving her self-care and coping strategies. Tx approach included emotion processing, behavioral activation and self-care emphasis, and values clarification. CBT for Chronic Pain was also considered, though pt stated she was still in the process of identifying causes and treatments for her pain and thus was not yet interested. Primary care-based behavioral health treatment was collaboratively terminated today, with pt referred to community providers to establish for long-term behavioral health care. Pt will continue to maintain behavioral activation schedule, sleep hygiene practice, and values-guided actions (e.g., attending doctor's appointments, taking care of self). PHQ Scores 2/5/2021 12/18/2018   PHQ2 Score 4 0   PHQ9 Score 13 0     Interpretation of Total Score Depression Severity: 1-4 = Minimal depression, 5-9 = Mild depression, 10-14 = Moderate depression, 15-19 = Moderately severe depression, 20-27 = Severe depression    How often pt has had thoughts of death or hurting self (if PHQ positive for depression):       AMAN 7 SCORE 2/8/2021   AMAN-7 Total Score 14     Interpretation of AMAN-7 score: 5-9 = mild anxiety, 10-14 = moderate anxiety, 15+ = severe anxiety. Recommend referral to behavioral health for scores 10 or greater. DIAGNOSIS  Lidya Doan was seen today for depression and anxiety.     Diagnoses and all orders for this visit:    Major depressive disorder, recurrent episode, moderate with anxious distress [facility])      INTERVENTION  Discussed and set plan for behavioral activation, Discussed self-care (sleep, nutrition, rewarding activities, social support, exercise), Discussed benefits of referral for specialty care, Established rapport, Lorraine-setting to identify pt's primary goals for [facility] visit / overall health, Supportive techniques, Emphasized self-care as important for managing overall health, Provided Psychoeducation re: emotions and values and Collaborative treatment planning,Clarified role of [facility] in primary care,Recommended that pt establish with a mental health clinician with whom they can meet regularly for psychotherapy services, Values clarification, Relapse prevention planning      PLAN  1. Pt will continue practicing sleep hygiene, behavioral activation, and values-consistent actions. 2. Pt will continue to explore provided referrals and set up intake appt to establish care in the community for long-term behavioral health support. INTERACTIVE COMPLEXITY  Is interactive complexity present?   No  Reason:  N/A  Additional Supporting Information:  N/A

## 2021-05-24 NOTE — PROCEDURES
INSTRUMENTAL SWALLOW REPORT  MODIFIED BARIUM SWALLOW    NAME: Meg Cancino   : 1970  MRN: [id number]       Date of Eval: 2021              Referring Diagnosis(es):      Past Medical History:  has a past medical history of Acute respiratory failure with hypoxia ([address] Alcohol withdrawal syndrome, with delirium ([address] Alcoholism ([address] Anemia, Astrocytoma ([address] - 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24, the patient underwent 2 surgical resections without known recurrence, Closed fracture of lateral portion of left tibial plateau, COPD (chronic obstructive pulmonary disease) ([address] Depression, Dysphagia, GI bleed, Hypertension, Memory loss, Oxygen dependent, Pain, joint, ankle and foot, Pancreatic lesion, and Seizures ([address] Past Surgical History:  has a past surgical history that includes Hysterectomy (); Brain tumor excision (); fracture surgery (Left, 7-3-13); fracture surgery (Right); Upper gastrointestinal endoscopy (N/A, 10/22/2020); Colonoscopy (N/A, 10/22/2020); Endoscopic ultrasonography, GI (N/A, 2020); and Upper gastrointestinal endoscopy (N/A, 2021). Current Diet Solid Consistency: Regular  Current Diet Liquid Consistency: Thin       Type of Study: Initial MBS      Patient Complaints/Reason for Referral:  Meg Cancino was referred for a MBS to assess the efficiency of his/her swallow function, assess for aspiration, and to make recommendations regarding safe dietary consistencies, effective compensatory strategies, and safe eating environment. Onset of problem:      Pt. Reports that she has trouble with choking on her secretions at night. She reports she takes prilosec for reflux. Pt has COPD and reports she used to have chronic bronchitis, however does not think she does anymore. She reports thicker things are more difficult to swallow and she sometimes needs to tip her head back to move thicker foods back to initiate swallow. Behavior/Cognition/Vision/Hearing:  Behavior/Cognition: Alert; Cooperative  Vision: Impaired  Hearing: Within functional limits    Impressions:  Patient presents with  safe swallow for Regular diet with thin liquids as evidenced by no observed aspiration noted with consistencies tested. Pt. With + penetration with thin liquids, no aspiration. Pt. With decreased epiglottic inversion noted. Pt. Reported difficulty initiating swallow and tipped her head back X1 during assessment to help her initiate her swallow. Recommend small sips and bites, only feed when alert and awake and upright at 90 degrees for all PO intake. Recommend close monitoring for overt/clinical s/s of aspiration and D/C PO intake and complete Modified Barium Swallow Study should they occur. Results and recommendations reported to RN. Treatment Dx and ICD 10: R13.1   Patient Position: Lateral and Patient Degrees: 90      Consistencies Administered: Dysphagia Soft and Bite-Sized (Dysphagia III); Reg solid; Dysphagia Pureed (Dysphagia I); Thin cup; Thin straw    Recommended Diet:  Solid consistency: Regular  Liquid consistency: Thin  Liquid administration via: Cup;Straw    Medication administration: PO    Safe Swallow Protocol:     Compensatory Swallowing Strategies: Eat/Feed slowly;Upright as possible for all oral intake;Small bites/sips      Recommendations/Treatment  Requires SLP Intervention: Yes        D/C Recommendations: Outpatient  Postural Changes and/or Swallow Maneuvers: Upright 90 degrees      Recommended Exercises:    Therapeutic Interventions: Diet tolerance monitoring; Laryngeal exercises; Pharyngeal exercises    Referral To: Dysphagia treament    Education: Images and recommendations were reviewed with pt following this exam.   Patient Education: yes  Patient Education Response: Verbalizes understanding    Prognosis  Prognosis for safe diet advancement: good      Goals:    Long Term:     To Maximize safety with intake, optimize nutrition/hydration and minimize risk for aspiration. Short Term:  Goals: The patient will tolerate recommended diet without observed clinical signs of aspiration      Oral Preparation / Oral Phase  Oral Phase: Impaired    Oral Phase: Extended and slow mastication for all solids.   Pt would intermittently tip head back ti \"help her swallow\"      Pharyngeal Phase  Pharyngeal Phase: WFL    Pharyngeal: Puree/Fruit/Cracker: No penetration and no aspiration with min stasis     Dysphagia Outcome Severity Scale: Level 6: Within functional limits/Modified independence  Penetration-Aspiration Scale (PAS): 3 - Material enters the airway, remains above the vocal folds, and is not ejected from airway        Esophageal Phase  Esophageal Screen: [facility]        Pain   Patient Currently in Pain: No         Therapy Time:   Individual Concurrent Group Co-treatment   Time In 0920         Time Out 0940         Minutes 20                   MAXIMILIANO Kendall, 5/24/2021, 12:12 PM

## 2021-05-25 ENCOUNTER — OFFICE VISIT (OUTPATIENT)
Dept: FAMILY MEDICINE CLINIC | Age: 51
End: 2021-05-25
Payer: MEDICARE

## 2021-05-25 VITALS
HEART RATE: 98 BPM | OXYGEN SATURATION: 95 % | BODY MASS INDEX: 22.5 KG/M2 | WEIGHT: 135.2 LBS | SYSTOLIC BLOOD PRESSURE: 136 MMHG | TEMPERATURE: 98.1 F | DIASTOLIC BLOOD PRESSURE: 84 MMHG

## 2021-05-25 DIAGNOSIS — M48.54XA NONTRAUMATIC COMPRESSION FRACTURE OF T6 VERTEBRA, INITIAL ENCOUNTER (HCC): Primary | ICD-10-CM

## 2021-05-25 PROCEDURE — G8420 CALC BMI NORM PARAMETERS: HCPCS | Performed by: INTERNAL MEDICINE

## 2021-05-25 PROCEDURE — G8427 DOCREV CUR MEDS BY ELIG CLIN: HCPCS | Performed by: INTERNAL MEDICINE

## 2021-05-25 PROCEDURE — 4004F PT TOBACCO SCREEN RCVD TLK: CPT | Performed by: INTERNAL MEDICINE

## 2021-05-25 PROCEDURE — 3017F COLORECTAL CA SCREEN DOC REV: CPT | Performed by: INTERNAL MEDICINE

## 2021-05-25 PROCEDURE — 99213 OFFICE O/P EST LOW 20 MIN: CPT | Performed by: INTERNAL MEDICINE

## 2021-05-25 RX ORDER — BACLOFEN 10 MG/1
10 TABLET ORAL 3 TIMES DAILY
Qty: 60 TABLET | Refills: 1 | Status: SHIPPED | OUTPATIENT
Start: 2021-05-25 | End: 2021-11-01

## 2021-05-25 RX ORDER — PREDNISONE 10 MG/1
TABLET ORAL
Qty: 30 TABLET | Refills: 0 | Status: SHIPPED | OUTPATIENT
Start: 2021-05-25 | End: 2021-06-30 | Stop reason: ALTCHOICE

## 2021-05-25 RX ORDER — KETOROLAC TROMETHAMINE 30 MG/ML
30 INJECTION, SOLUTION INTRAMUSCULAR; INTRAVENOUS ONCE
Status: COMPLETED | OUTPATIENT
Start: 2021-05-25 | End: 2021-05-25

## 2021-05-25 RX ORDER — BUSPIRONE HYDROCHLORIDE 15 MG/1
1 TABLET ORAL 3 TIMES DAILY
Status: ON HOLD | COMMUNITY
Start: 2021-05-05 | End: 2021-08-03 | Stop reason: HOSPADM

## 2021-05-25 RX ORDER — IBUPROFEN 600 MG/1
600 TABLET ORAL 3 TIMES DAILY PRN
Qty: 30 TABLET | Refills: 0 | Status: ON HOLD
Start: 2021-05-25 | End: 2021-08-12 | Stop reason: HOSPADM

## 2021-05-25 RX ADMIN — KETOROLAC TROMETHAMINE 30 MG: 30 INJECTION, SOLUTION INTRAMUSCULAR; INTRAVENOUS at 12:02

## 2021-05-25 SDOH — ECONOMIC STABILITY: FOOD INSECURITY: WITHIN THE PAST 12 MONTHS, THE FOOD YOU BOUGHT JUST DIDN'T LAST AND YOU DIDN'T HAVE MONEY TO GET MORE.: NEVER TRUE

## 2021-05-25 SDOH — ECONOMIC STABILITY: FOOD INSECURITY: WITHIN THE PAST 12 MONTHS, YOU WORRIED THAT YOUR FOOD WOULD RUN OUT BEFORE YOU GOT MONEY TO BUY MORE.: NEVER TRUE

## 2021-05-25 ASSESSMENT — ENCOUNTER SYMPTOMS
CONSTIPATION: 0
ANAL BLEEDING: 0
WHEEZING: 0
BOWEL INCONTINENCE: 0
DIARRHEA: 0
NAUSEA: 0
CHOKING: 0
VOMITING: 0
BLOOD IN STOOL: 0
SHORTNESS OF BREATH: 0
ABDOMINAL PAIN: 0
COUGH: 0
BACK PAIN: 1
CHEST TIGHTNESS: 0

## 2021-05-25 NOTE — PROGRESS NOTES
Subjective:       Patient ID:     Freddy Pickard is a 45 y.o. female who presents for   Chief Complaint   Patient presents with    Back Pain       HPI:  Nursing note reviewed and discussed with patient. Back Pain  This is a new problem. The current episode started in the past 7 days. The problem occurs constantly. The problem has been waxing and waning since onset. Pain location: right upper back  The quality of the pain is described as cramping (cramps so hard it takes her breath away ). The pain does not radiate. The pain is moderate. The pain is the same all the time. The symptoms are aggravated by sitting, lying down, twisting, bending and coughing. Pertinent negatives include no abdominal pain, bladder incontinence, bowel incontinence, chest pain, dysuria, fever, headaches, leg pain, numbness, paresis, paresthesias, pelvic pain, perianal numbness, tingling, weakness or weight loss. Risk factors include poor posture and sedentary lifestyle. She has tried muscle relaxant, analgesics, ice and heat for the symptoms. The treatment provided no relief. Patient's medications, allergies, past medical, surgical, social and family histories were reviewed and updated as appropriate.     Past Medical History:   Diagnosis Date    Acute respiratory failure with hypoxia ([address] 10/16/2020    Alcohol withdrawal syndrome, with delirium ([address] 12/14/2019    Alcoholism ([address]     Anemia 10/2020    GI bleed    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24, the patient underwent 2 surgical resections without known recurrence 10/23/2020    Closed fracture of lateral portion of left tibial plateau 20/27/1817    COPD (chronic obstructive pulmonary disease) ([address]     CO2 retainer, on Bipap at night for this, Dr. Taylor Villalta ( last visit 11/20/2020 and note on chart )    Depression     bipolar, major depressive disorder, ptsd, anxiety    Dysphagia     GI bleed 10/2020    Hypertension     Memory loss     Oxygen dependent     pt stated not needed as of 12/9/2020    Pain, joint, ankle and foot     Pancreatic lesion 10/2020    Dr. Parveen Johnson working up pt    Seizures [facility])     also baseline tremors     Past Surgical History:   Procedure Laterality Date    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    COLONOSCOPY N/A 10/22/2020    COLONOSCOPY DIAGNOSTIC performed by Nichol Guardado MD at [address] (LOWER) N/A 12/9/2020    ENDOSCOPIC ULTRASOUND, UPPER WITH LINEAR SCOPE FOR BIOPSY OF MASS ON HEAD OF PANCREAS performed by Martha Noland MD at [address] Left 7-3-13    ORIF tibial plateau    FRACTURE SURGERY Right     small finger metacarpal fracture    HYSTERECTOMY  2003    UPPER GASTROINTESTINAL ENDOSCOPY N/A 10/22/2020    EGD BIOPSY performed by Nichol Guardado MD at [facility] N/A 4/5/2021    EGD BIOPSY performed by Nichol Guardado MD at [facility]       Social History     Tobacco Use    Smoking status: Current Every Day Smoker     Packs/day: 0.50     Years: 30.00     Pack years: 15.00     Types: Cigarettes    Smokeless tobacco: Never Used    Tobacco comment: plans on quitting in the future    Substance Use Topics    Alcohol use: Not Currently     Alcohol/week: 0.0 standard drinks      Patient Active Problem List   Diagnosis    Acute bronchitis    Reflex sympathetic dystrophy of lower limb    Essential hypertension    Nicotine addiction    Psychophysiological insomnia    Anxiety    Alcoholic peripheral neuropathy (HCC)    Hypokalemia    Macrocytic anemia    Hypomagnesemia    Tobacco use    Ambulatory dysfunction    Seizures (HCC)    Paresthesia of lower extremity    Pancreatic cyst    Recurrent major depressive disorder (HCC)    Elevated CA 19-9 level    Perineal mass, female    Esophagitis candidal     Condyloma    Astrocytoma (HCC) - 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24, the patient underwent 2 surgical resections without known recurrence    [facility] use disorder, severe[facility]    Acute metabolic encephalopathy    AMAN (generalized anxiety disorder)    Major depressive disorder, recurrent severe without psychotic features ([facility]    Esophageal dysphagia    Chronic peripheral neuropathic pain    History of alcohol abuse    History of petit-mal seizures    Intractable episodic tension-type headache    Personal history of astrocytoma         Prior to Visit Medications    Medication Sig Taking? Authorizing Provider   carBAMazepine (TEGRETOL XR) 200 MG extended release tablet TAKE ONE TABLET BY MOUTH THREE TIMES A DAY Yes Ludivina Hammond MD   traZODone (DESYREL) 50 MG tablet TAKE 1 AND 1/2 TABLET BY MOUTH ONCE NIGHTLY AS NEEDED FOR SLEEP Yes Hettie Paling, APRN - NP   sertraline (ZOLOFT) 100 MG tablet Take 2 tablets by mouth daily Yes Hettie Paling, APRN - NP   acamprosate (CAMPRAL) 333 MG tablet Take 2 tablets by mouth 3 times daily Yes Hetadan Paling, APRN - NP   amLODIPine (NORVASC) 5 MG tablet TAKE ONE TABLET BY MOUTH DAILY Yes Ludivina Hammond MD   pyridoxine 250 MG TABS Take 250 mg by mouth daily Yes Janice Munguia MD   pregabalin (LYRICA) 200 MG capsule Take 1 capsule by mouth 3 times daily for 90 days.  Yes Janice Munguia MD   omeprazole (PRILOSEC) 40 MG delayed release capsule Take 1 capsule by mouth 2 times daily Yes Nancy Reed MD   ferrous sulfate (IRON 325) 325 (65 Fe) MG tablet Take 1 tablet by mouth 2 times daily Yes Ludivina Hammond MD   rOPINIRole (REQUIP) 1 MG tablet Take 1 tablet by mouth nightly Yes Ludivina Hammond MD   budesonide-formoterol (SYMBICORT) 160-4.5 MCG/ACT AERO Inhale 2 puffs into the lungs 2 times daily Yes Ludivina Hammond MD   tiZANidine (ZANAFLEX) 4 MG tablet Take 1 tablet by mouth every 8 hours as needed (back pain) Yes Ludivina Hammond MD   hydrOXYzine (ATARAX) 50 MG tablet TAKE ONE TABLET BY MOUTH THREE TIMES A DAY Yes Ludivina Hammond MD   tiotropium (SPIRIVA RESPIMAT) 2.5 MCG/ACT AERS inhaler Inhale 2 puffs into the lungs daily Yes Denita Penn MD   sodium chloride (ALTAMIST SPRAY) 0.65 % nasal spray 1 spray by Nasal route as needed for Congestion Yes Ludivina Condon MD   potassium chloride (KLOR-CON M) 20 MEQ extended release tablet Take 1 tablet by mouth daily Yes Maiarufina Boothe, APRN - CNP   ACETAMINOPHEN EXTRA STRENGTH 500 MG tablet Take 500 mg by mouth 3 times daily as needed Yes Historical Provider, MD   albuterol sulfate HFA (VENTOLIN HFA) 108 (90 Base) MCG/ACT inhaler Inhale 2 puffs into the lungs 4 times daily as needed for Wheezing Yes Ludivina Condon MD   vitamin B-1 (THIAMINE) 100 MG tablet Take 1 tablet by mouth daily Yes Ludivina Conodn MD   vitamin D (ERGOCALCIFEROL) 29358 units CAPS capsule Take 1 capsule by mouth once a week Yes Ludivina Condon MD   folic acid (FOLVITE) 1 MG tablet Take 1 tablet by mouth daily Yes Ludivina Condon MD   busPIRone (BUSPAR) 15 MG tablet Take 1 tablet by mouth 3 times daily  Ludivina Condon MD   propranolol (INDERAL) 40 MG tablet Take 40 mg by mouth 2 times daily  Patient not taking: Reported on 5/25/2021  Historical Provider, MD     Review of Systems  Review of Systems   Constitutional: Negative for fatigue, fever, unexpected weight change and weight loss. Respiratory: Negative for cough, choking, chest tightness, shortness of breath and wheezing. Cardiovascular: Negative for chest pain, palpitations and leg swelling. Gastrointestinal: Negative for abdominal pain, anal bleeding, blood in stool, bowel incontinence, constipation, diarrhea, nausea and vomiting. Endocrine: Negative. Genitourinary: Negative for bladder incontinence, dysuria and pelvic pain. Musculoskeletal: Positive for back pain. Negative for joint swelling and myalgias. Skin: Negative. Neurological: Negative for dizziness, tingling, weakness, numbness, headaches and paresthesias.    Psychiatric/Behavioral: Negative for sleep disturbance. All other systems reviewed and are negative. Objective:       Physical Exam:  /84 (Site: Left Upper Arm, Position: Sitting, Cuff Size: Medium Adult)   Pulse 98   Temp 98.1 °F (36.7 °C) (Temporal)   Wt 135 lb 3.2 oz (61.3 kg)   SpO2 95%   BMI 22.50 kg/m²   Physical Exam  Vitals and nursing note reviewed. Constitutional:       General: She is in acute distress (pain with prolonged sitting, restless). Cardiovascular:      Rate and Rhythm: Normal rate and regular rhythm. Pulmonary:      Effort: Pulmonary effort is normal.      Breath sounds: Wheezing present. Musculoskeletal:      Cervical back: Spasms present. Thoracic back: Spasms and tenderness present. Back:       Comments: Pain with prolonged sitting, forward flexion, extension. Hard time getting up from sitting down   Neurological:      Mental Status: She is alert. Data Review  CT chest 5/24 reviewed - compression fractures T6, T8, T11 noted. Assessment/Plan:      1. Nontraumatic compression fracture of T6 vertebra, initial encounter ([facility])  - predniSONE (DELTASONE) 10 MG tablet; 4 tabs by mouth daily for 3 days, then 3 tabs daily for 3 days, then 2 tabs daily for 3 days, then 1 tab daily till gone. Dispense: 30 tablet; Refill: 0  - baclofen (LIORESAL) 10 MG tablet; Take 1 tablet by mouth 3 times daily  Dispense: 60 tablet; Refill: [address] Lidia Cotter MD, Pain Management, [place]  - ibuprofen (ADVIL;MOTRIN) 600 MG tablet; Take 1 tablet by mouth 3 times daily as needed for Pain  Dispense: 30 tablet;  Refill: 0  - ketorolac (TORADOL) injection 30 mg           Health Maintenance Due   Topic Date Due    Cervical cancer screen  Never done    Shingles Vaccine (1 of 2) Never done       Electronically signed by Timothy Euceda MD on 5/25/2021 at 11:38 AM

## 2021-06-01 ENCOUNTER — HOSPITAL ENCOUNTER (OUTPATIENT)
Dept: NEUROLOGY | Age: 51
Discharge: HOME | End: 2021-06-01
Payer: MEDICARE

## 2021-06-01 DIAGNOSIS — G60.9 IDIOPATHIC PERIPHERAL NEUROPATHY: ICD-10-CM

## 2021-06-01 PROCEDURE — 95911 NRV CNDJ TEST 9-10 STUDIES: CPT | Performed by: PHYSICAL MEDICINE & REHABILITATION

## 2021-06-01 PROCEDURE — 95886 MUSC TEST DONE W/N TEST COMP: CPT | Performed by: PHYSICAL MEDICINE & REHABILITATION

## 2021-06-02 DIAGNOSIS — I10 ESSENTIAL HYPERTENSION: ICD-10-CM

## 2021-06-02 NOTE — TELEPHONE ENCOUNTER
Last visit: 05/25/2021  Last Med refill: 05/06/2021  Does patient have enough medication for 72 hours: Yes    Next Visit Date:  Future Appointments   Date Time Provider Lexi Castillo   6/15/2021  1:30 PM LOI Rodriguez NP [facility] [address]   6/18/2021  2:00 PM Pedro Nieto MD sv gr [facility]ks [facility]   6/30/2021 12:00 PM Allyson Buckley MD Syl[facility]   7/6/2021  1:30 PM Vinod Dumont MD Neuro [facility] [place]   8/2/2021  1:00 PM Ludivina Arrington  [address] Maintenance   Topic Date Due    Cervical cancer screen  Never done    Shingles Vaccine (1 of 2) Never done    Breast cancer screen  11/06/2021    Potassium monitoring  12/23/2021    Creatinine monitoring  05/24/2022    Lipid screen  12/23/2025    Colon cancer screen colonoscopy  10/22/2030    DTaP/Tdap/Td vaccine (2 - Td or Tdap) 12/28/2030    Pneumococcal 0-64 years Vaccine (2 of 2) 07/05/2034    Flu vaccine  Completed    COVID-19 Vaccine  Completed    Hepatitis C screen  Completed    HIV screen  Completed    Hepatitis A vaccine  Aged Out    Hepatitis B vaccine  Aged Out    Hib vaccine  Aged Out    Meningococcal (ACWY) vaccine  Aged Out       Hemoglobin A1C (%)   Date Value   04/13/2021 5.5   07/14/2019 4.3             ( goal A1C is < 7)   No results found for: LABMICR  LDL Cholesterol (mg/dL)   Date Value   12/23/2020 134 (H)   06/18/2019 92       (goal LDL is <100)   AST (U/L)   Date Value   12/23/2020 24     ALT (U/L)   Date Value   12/23/2020 7     BUN (mg/dL)   Date Value   12/23/2020 5 (L)     BP Readings from Last 3 Encounters:   05/25/21 136/84   05/04/21 (!) 155/89   04/05/21 (!) 143/90          (goal 120/80)    All Future Testing planned in [facility]  Lab Frequency Next Occurrence   CBC With Auto Differential Once 11/02/2020   Hepatic Function Panel Once 11/02/2020   Cancer Antigen 19-9 Once 11/11/2020   CBC Auto Differential Once 29/14/2850   Basic Metabolic Panel Once 89/62/9175   Hepatic Function Panel Once 11/11/2020   Cancer Antigen 19-9 Once 02/12/2021   EGD Once 03/30/2021   SLP videofluoroscopic swallow study Once 07/31/2021   COVID-19 Once 05/19/2021               Patient Active Problem List:     Acute bronchitis     Reflex sympathetic dystrophy of lower limb     Essential hypertension     Nicotine addiction     Psychophysiological insomnia     Anxiety     Alcoholic peripheral neuropathy (HCC)     Hypokalemia     Macrocytic anemia     Hypomagnesemia     Tobacco use     Ambulatory dysfunction     Seizures (HCC)     Paresthesia of lower extremity     Pancreatic cyst     Recurrent major depressive disorder (HCC)     Elevated CA 19-9 level     Perineal mass, female     Esophagitis candidal      Condyloma     Astrocytoma ([address] - 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24, the patient underwent 2 surgical resections without known recurrence     Alcohol use disorder, severe, in early remission ([address]     Acute metabolic encephalopathy     AMAN (generalized anxiety disorder)     Major depressive disorder, recurrent severe without psychotic features ([address]     Esophageal dysphagia     Chronic peripheral neuropathic pain     History of alcohol abuse     History of petit-mal seizures     Intractable episodic tension-type headache     Personal history of astrocytoma

## 2021-06-03 RX ORDER — AMLODIPINE BESYLATE 5 MG/1
TABLET ORAL
Qty: 90 TABLET | Refills: 0 | Status: SHIPPED | OUTPATIENT
Start: 2021-06-03 | End: 2021-10-06

## 2021-06-06 LAB — STATUS: NORMAL

## 2021-06-18 ENCOUNTER — VIRTUAL VISIT (OUTPATIENT)
Dept: GASTROENTEROLOGY | Age: 51
End: 2021-06-18
Payer: MEDICARE

## 2021-06-18 DIAGNOSIS — R13.19 ESOPHAGEAL DYSPHAGIA: Primary | ICD-10-CM

## 2021-06-18 PROCEDURE — G8420 CALC BMI NORM PARAMETERS: HCPCS | Performed by: INTERNAL MEDICINE

## 2021-06-18 PROCEDURE — 99212 OFFICE O/P EST SF 10 MIN: CPT | Performed by: INTERNAL MEDICINE

## 2021-06-18 PROCEDURE — 4004F PT TOBACCO SCREEN RCVD TLK: CPT | Performed by: INTERNAL MEDICINE

## 2021-06-18 PROCEDURE — G8427 DOCREV CUR MEDS BY ELIG CLIN: HCPCS | Performed by: INTERNAL MEDICINE

## 2021-06-18 PROCEDURE — 3017F COLORECTAL CA SCREEN DOC REV: CPT | Performed by: INTERNAL MEDICINE

## 2021-06-18 RX ORDER — METOCLOPRAMIDE 5 MG/1
5 TABLET ORAL 3 TIMES DAILY
Qty: 120 TABLET | Refills: 2 | Status: SHIPPED | OUTPATIENT
Start: 2021-06-18 | End: 2021-07-01

## 2021-06-18 ASSESSMENT — ENCOUNTER SYMPTOMS
ALLERGIC/IMMUNOLOGIC NEGATIVE: 1
NAUSEA: 1
ABDOMINAL DISTENTION: 1
VOMITING: 0
DIARRHEA: 1
RECTAL PAIN: 0
BLOOD IN STOOL: 0
TROUBLE SWALLOWING: 1
RESPIRATORY NEGATIVE: 1
EYES NEGATIVE: 1

## 2021-06-18 NOTE — PROGRESS NOTES
VIRTUAL VISIT:      Travon Louise is a 45 y.o. female evaluated via telephone on 6/18/2021. Consent:  She and/or health care decision maker is aware that that she may receive a bill for this telephone service, depending on her insurance coverage, and has provided verbal consent to proceed: Yes    Agree with ROS as documented and detailed by MA/LPN in separate note from today's encounter     Documentation:  I communicated with the patient and/or health care decision maker about . Details of this discussion including any medical advice provided:     Costa Sarah a 45 y.o. female with a past history remarkable for hypertension, depression, COPD, alcoholism with dependency and recent mission for withdrawal symptoms, additionally admitted for intoxication COPD exacerbation identified to have anemia with fecal occult positive results, underwent EGD and colonoscopy (poor prep) that revealed no obvious upper GI sources of the patient's anemia however did reveal severe condyloma involving the anorectal region and general region. Patient was referred to colorectal surgery and is undergoing evaluation to have her severe condyloma acuminata removed and treated for.     Patient will need a repeat colonoscopy with extended bowel prep when she is treated for condyloma soon as possible as she has a residual flat polyps identified on her index colonoscopy that need to be resected.     Is unlikely the patient is unable to tolerate bowel prep given that she has endoscopic evidence of severe gastroparesis. Her most recent upper endoscopy revealed significant to moderate amount of residual gastric food undigested suggestive of gastroparesis     Repeat upper endoscopy was performed abnormal esophagram which revealed a possible intraluminal narrowing in the midesophagus possible related to aortic arch compression.   EGD revealed possible extraluminal narrowing in the midesophagus but no intraluminal lesions that were obvious. Stomach again was filled with partially digested food suggestive of gastroparesis versus nonadherence to dietary instructions versus medication induced poor contractility     Currently the patient reports a difficulty initiating swallowing in the oropharyngeal region. Does not report any globus sensation but does not report any esophageal symptoms    CT of the chest was negative for any extraluminal compression of the midesophagus  Swallowing study was unremarkable  Emptying test identified evidence of gastroparesis    Recommendation:  Needs repeat colonoscopy for residual polyps, pending procedure with CR surgery, Dr. Seth Maldonado. Once completed the patient to be scheduled for colonoscopy for polyp removal.  Reglan 5 mg TID for gastroparesis to be tried. Risk-benefit and alternative discussed with the patient. Prilosec 40mg BID to be continued, patient was instructed on appropriate use of medication. Strongly advised smoking cessation  Follow up in 4-6 weeks. I affirm this is a Patient Initiated Episode with an Established Patient who has not had a related appointment within my department in the past 7 days or scheduled within the next 24 hours. Total Time: minutes: 11-20 minutes    Chris Rogers is a 45 y.o. female being evaluated by a Virtual Visit (telephone visit) encounter to address concerns as mentioned above. A caregiver was present when appropriate. Due to this being a TeleHealth encounter ([facility]), evaluation of the following organ systems was limited: Vitals/Constitutional/EENT/Resp/CV/GI//MS/Neuro/Skin/Heme-Lymph-Imm.   Pursuant to the emergency declaration under the [facility], [address] [place] [address] authority and the [employer] and [employer] Act, this Virtual Visit was conducted with patient's (and/or legal guardian's) consent, to reduce the patient's risk of exposure to COVID-19 and provide necessary medical care. The patient (and/or legal guardian) has also been advised to contact this office for worsening conditions or problems, and seek emergency medical treatment and/or call 911 if deemed necessary. Services were provided through a telephone synchronous discussion virtually to substitute for in-person clinic visit. Patient and provider were located at their individual homes. --Kristin Kussmaul, MD on 6/18/2021 at 2:19 PM    An electronic signature was used to authenticate this note.        Kristin Kussmaul, MD MD  [facility]

## 2021-06-18 NOTE — PROGRESS NOTES
Review of Systems   Constitutional: Positive for appetite change and fatigue. Negative for unexpected weight change. HENT: Positive for trouble swallowing (saliva). Eyes: Negative. Respiratory: Negative. Cardiovascular: Negative. Gastrointestinal: Positive for abdominal distention, diarrhea and nausea. Negative for blood in stool, rectal pain and vomiting. Anal bleeding: hemorrhoids. Endocrine: Negative. Genitourinary: Negative. Musculoskeletal: Positive for gait problem. Skin: Negative. Allergic/Immunologic: Negative. Hematological: Negative. Psychiatric/Behavioral: Positive for sleep disturbance. All other systems reviewed and are negative.

## 2021-06-23 DIAGNOSIS — F32.9 MAJOR DEPRESSIVE DISORDER, REMISSION STATUS UNSPECIFIED, UNSPECIFIED WHETHER RECURRENT: ICD-10-CM

## 2021-06-23 RX ORDER — SERTRALINE HYDROCHLORIDE 100 MG/1
TABLET, FILM COATED ORAL
Qty: 60 TABLET | Refills: 0 | Status: SHIPPED | OUTPATIENT
Start: 2021-06-23 | End: 2021-07-06

## 2021-06-23 NOTE — TELEPHONE ENCOUNTER
Last visit: 05/25/2021  Last Med refill: 05/20/2021  Does patient have enough medication for 72 hours: No:     Next Visit Date:  Future Appointments   Date Time Provider Lexi Castillo   6/30/2021 12:00 PM MD Justen Hartman [facility]   7/1/2021  2:30 PM [facility] PAT RM 1 [facility]   7/6/2021  1:30 PM Vonda Nielsen MD Neuro [facility]   8/2/2021  1:00 PM Ludivina Velez  [address] Maintenance   Topic Date Due    Cervical cancer screen  Never done    Shingles Vaccine (1 of 2) Never done    Breast cancer screen  11/06/2021    Potassium monitoring  12/23/2021    Creatinine monitoring  05/24/2022    Lipid screen  12/23/2025    Colon cancer screen colonoscopy  10/22/2030    DTaP/Tdap/Td vaccine (2 - Td or Tdap) 12/28/2030    Pneumococcal 0-64 years Vaccine (2 of 2) 07/05/2034    Flu vaccine  Completed    COVID-19 Vaccine  Completed    Hepatitis C screen  Completed    HIV screen  Completed    Hepatitis A vaccine  Aged Out    Hepatitis B vaccine  Aged Out    Hib vaccine  Aged Out    Meningococcal (ACWY) vaccine  Aged Out       Hemoglobin A1C (%)   Date Value   04/13/2021 5.5   07/14/2019 4.3             ( goal A1C is < 7)   No results found for: LABMICR  LDL Cholesterol (mg/dL)   Date Value   12/23/2020 134 (H)   06/18/2019 92       (goal LDL is <100)   AST (U/L)   Date Value   12/23/2020 24     ALT (U/L)   Date Value   12/23/2020 7     BUN (mg/dL)   Date Value   12/23/2020 5 (L)     BP Readings from Last 3 Encounters:   05/25/21 136/84   05/04/21 (!) 155/89   04/05/21 (!) 143/90          (goal 120/80)    All Future Testing planned in [facility]  Lab Frequency Next Occurrence   CBC With Auto Differential Once 11/02/2020   Hepatic Function Panel Once 11/02/2020   Cancer Antigen 19-9 Once 11/11/2020   CBC Auto Differential Once 28/13/3668   Basic Metabolic Panel Once 35/13/1839   Hepatic Function Panel Once 11/11/2020   Cancer Antigen 19-9 Once 02/12/2021 EGD Once 03/30/2021   SLP videofluoroscopic swallow study Once 07/31/2021   COVID-19 Once 05/19/2021               Patient Active Problem List:     Acute bronchitis     Reflex sympathetic dystrophy of lower limb     Essential hypertension     Nicotine addiction     Psychophysiological insomnia     Anxiety     Alcoholic peripheral neuropathy (HCC)     Hypokalemia     Macrocytic anemia     Hypomagnesemia     Tobacco use     Ambulatory dysfunction     Seizures (HCC)     Paresthesia of lower extremity     Pancreatic cyst     Recurrent major depressive disorder (HCC)     Elevated CA 19-9 level     Perineal mass, female     Esophagitis candidal      Condyloma     Astrocytoma ([address] - 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24, the patient underwent 2 surgical resections without known recurrence     Alcohol use disorder, severe, in early remission ([address]     Acute metabolic encephalopathy     AMAN (generalized anxiety disorder)     Major depressive disorder, recurrent severe without psychotic features ([address]     Esophageal dysphagia     Chronic peripheral neuropathic pain     History of alcohol abuse     History of petit-mal seizures     Intractable episodic tension-type headache     Personal history of astrocytoma

## 2021-06-24 RX ORDER — POTASSIUM CHLORIDE 1500 MG/1
TABLET, EXTENDED RELEASE ORAL
Qty: 30 TABLET | Refills: 2 | Status: SHIPPED | OUTPATIENT
Start: 2021-06-24 | End: 2021-10-06

## 2021-06-30 ENCOUNTER — OFFICE VISIT (OUTPATIENT)
Dept: PAIN MANAGEMENT | Age: 51
End: 2021-06-30
Payer: MEDICARE

## 2021-06-30 VITALS
DIASTOLIC BLOOD PRESSURE: 95 MMHG | HEIGHT: 65 IN | OXYGEN SATURATION: 92 % | HEART RATE: 83 BPM | WEIGHT: 130 LBS | BODY MASS INDEX: 21.66 KG/M2 | SYSTOLIC BLOOD PRESSURE: 138 MMHG

## 2021-06-30 DIAGNOSIS — M54.9 MULTILEVEL SPINE PAIN: ICD-10-CM

## 2021-06-30 DIAGNOSIS — G89.4 CHRONIC PAIN SYNDROME: Primary | ICD-10-CM

## 2021-06-30 DIAGNOSIS — F12.10 MARIJUANA ABUSE: ICD-10-CM

## 2021-06-30 PROCEDURE — G8427 DOCREV CUR MEDS BY ELIG CLIN: HCPCS | Performed by: ANESTHESIOLOGY

## 2021-06-30 PROCEDURE — G8420 CALC BMI NORM PARAMETERS: HCPCS | Performed by: ANESTHESIOLOGY

## 2021-06-30 PROCEDURE — 99243 OFF/OP CNSLTJ NEW/EST LOW 30: CPT | Performed by: ANESTHESIOLOGY

## 2021-06-30 RX ORDER — SODIUM CHLORIDE, SODIUM LACTATE, POTASSIUM CHLORIDE, CALCIUM CHLORIDE 600; 310; 30; 20 MG/100ML; MG/100ML; MG/100ML; MG/100ML
1000 INJECTION, SOLUTION INTRAVENOUS CONTINUOUS
Status: CANCELLED | OUTPATIENT
Start: 2021-06-30

## 2021-06-30 ASSESSMENT — ENCOUNTER SYMPTOMS
SHORTNESS OF BREATH: 1
COUGH: 1
DIARRHEA: 1
BACK PAIN: 1
EYE PAIN: 1

## 2021-06-30 NOTE — PROGRESS NOTES
The patient is a 45 y. o. Non-/non  female. Chief Complaint   Patient presents with    Back Pain    Consultation          HPI    Requesting physician for the evaluation of Ian Merlin 1970: Tequila Campos MD     Pain History  Chronic pain involving multiple side along the spine extending from neck all the way to the lumbar area  Reports intermittent radiation of lower back pain down both legs  Report intermittent numbness in both feet  Neck pain is nonradicular and located in the cervical spine area  Described the pain as constant aching throbbing sharp sensation  Pain aggravated with routine activity  Nothing seems to alleviate the pain  No changes in bladder or bowel control  No previous spine surgical history  No previous spine injection history  No previous physical therapy for spine  Diagnostic work-up include plain films and CT cervical spine that did not show any significant pathology    Review of chart shows patient is taking 2 different her muscle relaxant baclofen and tizanidine  Also taking Motrin and Lyrica  Review of chart shows several psych medication on her medication list  Pain score today  7  1. Location: neck, thoracic and lumbar spine   2. Radiation:bilateral legs  3. Character:aching and sharp  5. Duration:intermittent  6. Onset: years  7. Did an injury cause pain: yes, unknown  8. Aggravating factors:any movement  9. Alleviating factors:ice, heat, rest,  10. Associated symptoms (numbness / tingling / weakness):  yes, numbness, tingling  -Where at:bilateral feet  -Down into finger tips or toes (specify which finger or toes): first three toes on bilateral feet  -constant or intermitting: constant  11. Red Flags: (weight loss / chills / loss of bladder or bowel control): weight loss, loss of both bowel and bladder control    Previous management history  1.  Previous diagnostic workup: (Imaging/EMG)   CT, MRI, or Xray:Ct  What part of the body:cervical spine  What facility did they have it at:[facility]  What year or specific date: 12/13/19  EMG:  Yes 6/1/21 scanned in media    2. Previous non interventional treatments tried:  chiropractor or physical therapy:Chiropractor in past before injury  What part of the body:back  What facility was it done at: [facility] [address]  How long ago was it last tried:years  Did it work: No, \"hurt me\"  Did they complete it:No    3. Previous Medications tried  NSAID's: yes  Neurontin: yes  Lyrica: yes  Trycyclic antidepressant (Ellavil / Pamelor ): no  Cymbalta: no  Opioids (Ultram / Vicodin / Percocet / Morphine / Dilaudid / Oramorph/ Fentanyl etc.): none  Last Pain medication taken (name of med and date):Tylenol    4. Previous Interventional pain procedures tried:  What kind of injection:PCP gave her Toradol shot  Who did the injection: Dr. Louie Gaucher  did the injection help: yes  Last time injection was done:a few weeks ago    5.  Previous surgeries for pain  What part of the body did they have the surgery:n/a  What physician did the surgery:n/a  What Facility did they have the surgery done:n/a  Date of Surgery:N/A    Social History:  Marital status:Single  Employment History:Unit clerk in the past at [employer]  Working  No  Full time Or Part time: n/a  Disability  No   Legal Issues related to pain complaint: No     Pain Disability Index score : 73    Lab Results   Component Value Date    LABA1C 5.5 04/13/2021     Lab Results   Component Value Date     04/13/2021         Informant: patient      Past Medical History:   Diagnosis Date    Acute respiratory failure with hypoxia ([address] 10/16/2020    Alcohol withdrawal syndrome, with delirium ([address] 12/14/2019    Alcoholism ([address]     Anemia 10/2020    GI bleed    [facility]rocytoma [facility] - 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24, the patient underwent 2 surgical resections without known recurrence 10/23/2020    Closed fracture of lateral portion of left tibial plateau 31/74/3771    COPD (chronic obstructive pulmonary disease) ([facility]     CO2 retainer, on Bipap at night for this, Dr. Burnard Schwab ( last visit 11/20/2020 and note on chart )    Depression     bipolar, major depressive disorder, ptsd, anxiety    Dysphagia     GI bleed 10/2020    Hypertension     Memory loss     Oxygen dependent     pt stated not needed as of 12/9/2020    Pain, joint, ankle and foot     Pancreatic lesion 10/2020    Dr. Shamika Del Castillo working up pt    Seizures [facility])     also baseline tremors      Past Surgical History:   Procedure Laterality Date   [address]    astrocytoma times 2    COLONOSCOPY N/A 10/22/2020    COLONOSCOPY DIAGNOSTIC performed by Gilma Subramanian MD at [place] [address] (LOWER) N/A 12/9/2020    ENDOSCOPIC ULTRASOUND, UPPER WITH LINEAR SCOPE FOR BIOPSY OF MASS ON HEAD OF PANCREAS performed by Cory Patricia MD at [address] Left 7-3-13    ORIF tibial plateau    FRACTURE SURGERY Right     small finger metacarpal fracture    HYSTERECTOMY  2003    UPPER GASTROINTESTINAL ENDOSCOPY N/A 10/22/2020    EGD BIOPSY performed by Gilma Subramanian MD at [facility] N/A 4/5/2021    EGD BIOPSY performed by Gilma Subramanian MD at [facility]     Social History     Socioeconomic History    Marital status: Single     Spouse name: None    Number of children: None    Years of education: None    Highest education level: None   Occupational History    None   Tobacco Use    Smoking status: Current Every Day Smoker     Packs/day: 0.50     Years: 30.00     Pack years: 15.00     Types: Cigarettes    Smokeless tobacco: Never Used    Tobacco comment: plans on quitting in the future    Vaping Use    Vaping Use: Never used   Substance and Sexual Activity    Alcohol use: Not Currently     Alcohol/week: 0.0 standard drinks    Drug use: Not Currently    Sexual activity: None   Other Topics Concern    None   Social History Narrative    carBAMazepine (TEGRETOL XR) 200 MG extended release tablet TAKE ONE TABLET BY MOUTH THREE TIMES A DAY 90 tablet 2    traZODone (DESYREL) 50 MG tablet TAKE 1 AND 1/2 TABLET BY MOUTH ONCE NIGHTLY AS NEEDED FOR SLEEP 45 tablet 0    acamprosate (CAMPRAL) 333 MG tablet Take 2 tablets by mouth 3 times daily 180 tablet 0    pregabalin (LYRICA) 200 MG capsule Take 1 capsule by mouth 3 times daily for 90 days.  90 capsule 1    ferrous sulfate (IRON 325) 325 (65 Fe) MG tablet Take 1 tablet by mouth 2 times daily 180 tablet 1    rOPINIRole (REQUIP) 1 MG tablet Take 1 tablet by mouth nightly 90 tablet 1    budesonide-formoterol (SYMBICORT) 160-4.5 MCG/ACT AERO Inhale 2 puffs into the lungs 2 times daily 3 Inhaler 1    tiZANidine (ZANAFLEX) 4 MG tablet Take 1 tablet by mouth every 8 hours as needed (back pain) 30 tablet 3    hydrOXYzine (ATARAX) 50 MG tablet TAKE ONE TABLET BY MOUTH THREE TIMES A DAY 90 tablet 3    ACETAMINOPHEN EXTRA STRENGTH 500 MG tablet Take 500 mg by mouth 3 times daily as needed      albuterol sulfate HFA (VENTOLIN HFA) 108 (90 Base) MCG/ACT inhaler Inhale 2 puffs into the lungs 4 times daily as needed for Wheezing 1 Inhaler 5    vitamin B-1 (THIAMINE) 100 MG tablet Take 1 tablet by mouth daily 30 tablet 3    vitamin D (ERGOCALCIFEROL) 15038 units CAPS capsule Take 1 capsule by mouth once a week 12 capsule 1    folic acid (FOLVITE) 1 MG tablet Take 1 tablet by mouth daily 90 tablet 1    pyridoxine 250 MG TABS Take 250 mg by mouth daily (Patient not taking: Reported on 6/30/2021) 180 tablet 1    tiotropium (SPIRIVA RESPIMAT) 2.5 MCG/ACT AERS inhaler Inhale 2 puffs into the lungs daily 1 Inhaler 11    sodium chloride (ALTAMIST SPRAY) 0.65 % nasal spray 1 spray by Nasal route as needed for Congestion (Patient not taking: Reported on 6/30/2021) 1 Bottle 3    propranolol (INDERAL) 40 MG tablet Take 40 mg by mouth 2 times daily  (Patient not taking: Reported on 6/30/2021)       No current facility-administered medications for this visit. Review of Systems   Constitutional: Positive for fatigue. Negative for fever. HENT: Negative. Eyes: Positive for pain and visual disturbance. Respiratory: Positive for cough and shortness of breath. Cardiovascular: Positive for palpitations. Gastrointestinal: Positive for diarrhea. Endocrine: Negative. Genitourinary: Negative. Musculoskeletal: Positive for arthralgias, back pain, myalgias and neck pain. Skin: Negative. Allergic/Immunologic: Positive for environmental allergies. Neurological: Positive for numbness. Hematological: Negative. Psychiatric/Behavioral: Positive for confusion and sleep disturbance. The patient is nervous/anxious. All other systems reviewed and are negative. Objective:  General Appearance:  Well-appearing and in no acute distress. Vital signs: (most recent): Blood pressure (!) 138/95, pulse 83, height 5' 5\" (1.651 m), weight 130 lb (59 kg), SpO2 92 %. Vital signs are normal.  No fever. Output: Producing urine and producing stool. HEENT: Normal HEENT exam.    Lungs:  Normal effort and normal respiratory rate. Breath sounds clear to auscultation. She is not in respiratory distress. No decreased breath sounds. Heart: Normal rate. Regular rhythm. Abdomen: Abdomen is soft. There is no abdominal tenderness. Extremities: Normal range of motion. There is no deformity. Neurological: Patient is alert and oriented to person, place and time. Normal strength. Patient has normal reflexes, normal muscle tone and normal coordination. Pupils:  Pupils are equal, round, and reactive to light. Pupils are equal.   Skin:  Warm and dry. No rash or cyanosis.       Assessment & Plan      Pain History  Chronic pain involving multiple side along the spine extending from neck all the way to the lumbar area  Reports intermittent radiation of lower back pain down both legs  Report intermittent numbness in both feet  Neck pain is nonradicular and located in the cervical spine area  Described the pain as constant aching throbbing sharp sensation  Pain aggravated with routine activity  Nothing seems to alleviate the pain  No changes in bladder or bowel control  No previous spine surgical history  No previous spine injection history  No previous physical therapy for spine  Diagnostic work-up include plain films and CT cervical spine that did not show any significant pathology    Review of chart shows patient is taking 2 different her muscle relaxant baclofen and tizanidine  Also taking Motrin and Lyrica  Review of chart shows several psych medication on her medication list  Pain score today  7    1. Chronic pain syndrome    2. Multilevel spine pain    3. Marijuana abuse        Orders Placed This Encounter   Procedures    Ambulatory referral to Physical Therapy      No orders of the defined types were placed in this encounter.      Multisite spine pain extending from cervical to the lumbar area  No significant dermatomal pattern radiation  No previous physical therapy  Will recommend physical therapy  We will advised to use 1 muscle relaxant  High risk for opioid considering concomitant use of several other sedatives and psych medication  Admits to recreational marijuana use    I will recommend for MRI imaging after therapy and will then consider for appropriate interventional procedure  Consultation note sent to the referring physician      Electronically signed by Devin Kang MD on 6/30/2021 at 12:57 PM

## 2021-07-01 ENCOUNTER — HOSPITAL ENCOUNTER (OUTPATIENT)
Dept: PREADMISSION TESTING | Age: 51
Discharge: HOME | End: 2021-07-05
Payer: MEDICARE

## 2021-07-01 VITALS
RESPIRATION RATE: 18 BRPM | OXYGEN SATURATION: 94 % | BODY MASS INDEX: 22.82 KG/M2 | HEART RATE: 95 BPM | TEMPERATURE: 97.9 F | DIASTOLIC BLOOD PRESSURE: 91 MMHG | HEIGHT: 65 IN | SYSTOLIC BLOOD PRESSURE: 130 MMHG | WEIGHT: 137 LBS

## 2021-07-01 DIAGNOSIS — G62.9 PERIPHERAL POLYNEUROPATHY: ICD-10-CM

## 2021-07-01 LAB
ANION GAP SERPL CALCULATED.3IONS-SCNC: 15 MMOL/L (ref 9–17)
BUN BLDV-MCNC: 7 MG/DL (ref 6–20)
CHLORIDE BLD-SCNC: 92 MMOL/L (ref 98–107)
CO2: 25 MMOL/L (ref 20–31)
CREAT SERPL-MCNC: 0.55 MG/DL (ref 0.5–0.9)
GFR AFRICAN AMERICAN: >60 ML/MIN
GFR NON-AFRICAN AMERICAN: >60 ML/MIN
GFR SERPL CREATININE-BSD FRML MDRD: NORMAL ML/MIN/{1.73_M2}
GFR SERPL CREATININE-BSD FRML MDRD: NORMAL ML/MIN/{1.73_M2}
GLUCOSE BLD-MCNC: 65 MG/DL (ref 70–99)
HCT VFR BLD CALC: 44.6 % (ref 36.3–47.1)
HEMOGLOBIN: 14.7 G/DL (ref 11.9–15.1)
POTASSIUM SERPL-SCNC: 4.4 MMOL/L (ref 3.7–5.3)
SODIUM BLD-SCNC: 132 MMOL/L (ref 135–144)

## 2021-07-01 PROCEDURE — 85014 HEMATOCRIT: CPT

## 2021-07-01 PROCEDURE — 80051 ELECTROLYTE PANEL: CPT

## 2021-07-01 PROCEDURE — 82947 ASSAY GLUCOSE BLOOD QUANT: CPT

## 2021-07-01 PROCEDURE — 82565 ASSAY OF CREATININE: CPT

## 2021-07-01 PROCEDURE — 85018 HEMOGLOBIN: CPT

## 2021-07-01 PROCEDURE — 84520 ASSAY OF UREA NITROGEN: CPT

## 2021-07-01 PROCEDURE — 36415 COLL VENOUS BLD VENIPUNCTURE: CPT

## 2021-07-01 PROCEDURE — 93005 ELECTROCARDIOGRAM TRACING: CPT | Performed by: ANESTHESIOLOGY

## 2021-07-01 ASSESSMENT — PAIN DESCRIPTION - LOCATION: LOCATION: BACK

## 2021-07-01 ASSESSMENT — PAIN SCALES - GENERAL: PAINLEVEL_OUTOF10: 5

## 2021-07-01 ASSESSMENT — PAIN DESCRIPTION - DESCRIPTORS: DESCRIPTORS: BURNING;HEAVINESS

## 2021-07-01 ASSESSMENT — PAIN DESCRIPTION - ONSET: ONSET: ON-GOING

## 2021-07-01 ASSESSMENT — PAIN DESCRIPTION - FREQUENCY: FREQUENCY: CONTINUOUS

## 2021-07-01 ASSESSMENT — PAIN DESCRIPTION - PROGRESSION: CLINICAL_PROGRESSION: GRADUALLY WORSENING

## 2021-07-01 ASSESSMENT — PAIN DESCRIPTION - PAIN TYPE: TYPE: CHRONIC PAIN;ACUTE PAIN

## 2021-07-01 NOTE — PROGRESS NOTES
Anesthesia Focused Assessment    Has patient ever tested positive for COVID? No.  Patient has been vaccinated. STOP-BANG Sleep Apnea Questionnaire    SNORE loudly (heard through closed doors)? No  TIRED, fatigued, sleepy during daytime? No  OBSERVED stopping breathing during sleep? No  High blood PRESSURE being treated? Yes    BMI over 35? No  AGE over 48? Yes  NECK circumference over 16\"? No  GENDER (male)? No             Total 2  High risk 5-8  Intermediate risk 3-4  Low risk 0-2    Obstructive Sleep Apnea: denies  If YES, machine used: no     Type 1 DM:   no  T2DM:  no    Coronary Artery Disease:  Denies, says that she has had multiple stress tests and echos in the past.  Hypertension:  yes    Active smoker:  1 PPD for 30 years. Drinks Alcohol:  History of alcoholism, sober currently. Marijuana usage. Dentition: upper partial denture    Defib / AICD / Pacemaker: no      Renal Failure/dialysis:  no    Patient was evaluated in PAT & anesthesia guidelines were applied. NPO guidelines, medication instructions and scheduled arrival time were reviewed with patient. Hx of anesthesia complications:  no  Family hx of anesthesia complications:  no                                                                                                                     Anesthesia contacted:     Medical or cardiac clearance ordered: fax EKG and all results to PCP Dr. Magdy Lazaro and obtain clearance.     Roselyn Villegas PA-C  7/1/21  2:50 PM

## 2021-07-01 NOTE — H&P
History and Physical    Pt Name: Leighton Birmingham  MRN: [id number]  YOB: 1970  Date of evaluation: 7/1/2021    SUBJECTIVE:   History of Chief Complaint:    Patient presents for PAT appointment. She has been scheduled for EUA, excision anal and perineal warts. She was scheduled for this procedure last year but it was unable to be completed at that time due to personal reasons. She has now been scheduled for this procedure again. Past Medical History    has a past medical history of Acute respiratory failure with hypoxia ([address] Alcohol withdrawal syndrome, with delirium ([address] Alcoholism ([address] Anemia, 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24, the patient underwent 2 surgical resections without known recurrence, Closed fracture of lateral portion of left tibial plateau, COPD (chronic obstructive pulmonary disease) ([address] Depression, Dysphagia, GI bleed, Hypertension, Memory loss, Oxygen dependent, Pain, joint, ankle and foot, Pancreatic lesion, Seizures ([address] Tension headache, Under care of team, Under care of team, Under care of team, Under care of team, Under care of team, and Wellness examination. Patient has recent compression fractures of thoracic vertebra as well. Past Surgical History   has a past surgical history that includes Hysterectomy (2003); Brain tumor excision (1989); fracture surgery (Left, 7-3-13); fracture surgery (Right); Upper gastrointestinal endoscopy (N/A, 10/22/2020); Colonoscopy (N/A, 10/22/2020); Endoscopic ultrasonography, GI (N/A, 12/9/2020); Upper gastrointestinal endoscopy (N/A, 4/5/2021); and Hand surgery. Medications  Prior to Admission medications    Medication Sig Start Date End Date Taking?  Authorizing Provider   KLOR-CON M20 20 MEQ extended release tablet TAKE ONE TABLET BY MOUTH DAILY 6/24/21  Yes Ludivina Sinclair MD   sertraline (ZOLOFT) 100 MG tablet TAKE TWO TABLETS BY MOUTH DAILY 6/23/21  Yes LOI Herring NP   amLODIPine (NORVASC) 5 MG tablet TAKE ONE TABLET BY MOUTH DAILY 6/3/21  Yes Ludivina Kc MD   busPIRone (BUSPAR) 15 MG tablet Take 1 tablet by mouth 3 times daily 5/5/21  Yes Ceci Escalona MD   baclofen (LIORESAL) 10 MG tablet Take 1 tablet by mouth 3 times daily 5/25/21  Yes Ceci Escalona MD   ibuprofen (ADVIL;MOTRIN) 600 MG tablet Take 1 tablet by mouth 3 times daily as needed for Pain 5/25/21  Yes Ludivina Kc MD   carBAMazepine (TEGRETOL XR) 200 MG extended release tablet TAKE ONE TABLET BY MOUTH THREE TIMES A DAY 5/21/21  Yes Ludivina Kc MD   traZODone (DESYREL) 50 MG tablet TAKE 1 AND 1/2 TABLET BY MOUTH ONCE NIGHTLY AS NEEDED FOR SLEEP 5/20/21  Yes LOI Hodgson NP   acamprosate (CAMPRAL) 333 MG tablet Take 2 tablets by mouth 3 times daily 5/17/21 7/1/21 Yes LOI Hodgson NP   pregabalin (LYRICA) 200 MG capsule Take 1 capsule by mouth 3 times daily for 90 days.  5/4/21 8/2/21 Yes Lizeth Richmond MD   ferrous sulfate (IRON 325) 325 (65 Fe) MG tablet Take 1 tablet by mouth 2 times daily 4/22/21  Yes Ludivina Kc MD   rOPINIRole (REQUIP) 1 MG tablet Take 1 tablet by mouth nightly 4/1/21  Yes Ludivina Kc MD   budesonide-formoterol [facility]) 160-4.5 MCG/ACT AERO Inhale 2 puffs into the lungs 2 times daily 3/31/21  Yes Ludivina Kc MD   hydrOXYzine (ATARAX) 50 MG tablet TAKE ONE TABLET BY MOUTH THREE TIMES A DAY 3/12/21  Yes Ludivina Kc MD   tiotropium (SPIRIVA RESPIMAT) 2.5 MCG/ACT AERS inhaler Inhale 2 puffs into the lungs daily 2/26/21 7/1/21 Yes Kalyani Marinelli MD   sodium chloride (ALTAMIST SPRAY) 0.65 % nasal spray 1 spray by Nasal route as needed for Congestion 12/28/20  Yes Ludivina Kc MD   ACETAMINOPHEN EXTRA STRENGTH 500 MG tablet Take 500 mg by mouth 3 times daily as needed 5/8/20  Yes Historical Provider, MD   albuterol sulfate HFA (VENTOLIN HFA) 108 (90 Base) MCG/ACT inhaler Inhale 2 puffs into the lungs 4 times daily as needed for Wheezing 6/11/20  Yes Ludivina Shanell Carpio MD   vitamin B-1 (THIAMINE) 100 MG tablet Take 1 tablet by mouth daily 7/12/19  Yes Ludivina Carpio MD   vitamin D (ERGOCALCIFEROL) 42810 units CAPS capsule Take 1 capsule by mouth once a week 6/19/19  Yes Ludviina Carpio MD   folic acid (FOLVITE) 1 MG tablet Take 1 tablet by mouth daily 6/19/19  Yes Ludivina Carpio MD   sertraline (ZOLOFT) 100 MG tablet Take 2 tablets by mouth daily 5/17/21   LOI Mcallister NP   pyridoxine 250 MG TABS Take 250 mg by mouth daily  Patient not taking: Reported on 6/30/2021 5/4/21 8/2/21  Denita Wharton MD     Allergies  has No Known Allergies. Family History  family history includes Cancer in her maternal grandmother; Esophageal Cancer in her maternal aunt; Heart Disease in her paternal grandmother; Other in her father. Social History   reports that she has been smoking cigarettes. She has a 15.00 pack-year smoking history. She has never used smokeless tobacco.   reports previous alcohol use. reports current drug use. Frequency: 2.00 times per week. Drug: Marijuana. Marital Status single  Occupation none    OBJECTIVE:   VITALS:  height is 5' 5\" (1.651 m) and weight is 137 lb (62.1 kg). Her temporal temperature is 97.9 °F (36.6 °C). Her blood pressure is 130/91 (abnormal) and her pulse is 95. Her respiration is 18 and oxygen saturation is 94%. CONSTITUTIONAL:alert & cooperative, no acute distress. Very pleasant and talkative. Somewhat of a hoarse voice. SKIN:  Warm and dry, no rashes on exposed areas of skin. HEAD:  Normocephalic, atraumatic  EYES: EOMs intact. Wearing glasses. EARS:  Hearing grossly WNL. NOSE:  Nares patent. No rhinorrhea. MOUTH/THROAT:  benign  NECK:supple, no lymphadenopathy  LUNGS: Clear to auscultation bilaterally, no wheezes, scattered rhonchi that clear with cough. CARDIOVASCULAR: Heart sounds are normal.  Regular rate and rhythm without murmur. ABDOMEN: soft, non tender, non distended.   Thin abdomen. EXTREMITIES: no edema bilateral lower extremities. Testing:   EK21  Labs pending: drawn 2021    IMPRESSIONS:     1.  has a past medical history of Acute respiratory failure with hypoxia ([address] (10/16/2020), Alcohol withdrawal syndrome, with delirium ([address] (2019), Alcoholism ([address] Anemia (10/2020), 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24, the patient underwent 2 surgical resections without known recurrence (10/23/2020), Closed fracture of lateral portion of left tibial plateau (), COPD (chronic obstructive pulmonary disease) ([address] Depression, Dysphagia, GI bleed (10/2020), Hypertension, Memory loss, Oxygen dependent, Pain, joint, ankle and foot, Pancreatic lesion (10/2020), Seizures ([address] Tension headache, Under care of team (2021), Under care of team (2021), Under care of team (2021), Under care of team (2021), Under care of team (2021), and Wellness examination (2021).    PLANS:   1. EUA, excision of anal and perineal warts    LILLIANA Briseno PA-C  Electronically signed 2021 at 4:01 PM

## 2021-07-02 LAB
EKG ATRIAL RATE: 74 BPM
EKG P AXIS: 69 DEGREES
EKG P-R INTERVAL: 242 MS
EKG Q-T INTERVAL: 378 MS
EKG QRS DURATION: 88 MS
EKG QTC CALCULATION (BAZETT): 419 MS
EKG R AXIS: 74 DEGREES
EKG T AXIS: 74 DEGREES
EKG VENTRICULAR RATE: 74 BPM

## 2021-07-02 RX ORDER — PREGABALIN 200 MG/1
200 CAPSULE ORAL 3 TIMES DAILY
Qty: 90 CAPSULE | Refills: 2 | Status: SHIPPED | OUTPATIENT
Start: 2021-07-02 | End: 2021-10-09

## 2021-07-02 NOTE — PROGRESS NOTES
Sung/ Dr. Cherry Meyer notified NA+=132, glucose=65, and pt.  Has compression fractures thoracic spine

## 2021-07-06 ENCOUNTER — OFFICE VISIT (OUTPATIENT)
Dept: NEUROLOGY | Age: 51
End: 2021-07-06
Payer: MEDICARE

## 2021-07-06 VITALS
BODY MASS INDEX: 22.82 KG/M2 | DIASTOLIC BLOOD PRESSURE: 89 MMHG | SYSTOLIC BLOOD PRESSURE: 134 MMHG | HEART RATE: 85 BPM | HEIGHT: 65 IN | OXYGEN SATURATION: 94 % | WEIGHT: 137 LBS

## 2021-07-06 DIAGNOSIS — G40.909 SEIZURE DISORDER (HCC): ICD-10-CM

## 2021-07-06 DIAGNOSIS — G62.9 PERIPHERAL POLYNEUROPATHY: Primary | ICD-10-CM

## 2021-07-06 PROCEDURE — G8420 CALC BMI NORM PARAMETERS: HCPCS | Performed by: STUDENT IN AN ORGANIZED HEALTH CARE EDUCATION/TRAINING PROGRAM

## 2021-07-06 PROCEDURE — 3017F COLORECTAL CA SCREEN DOC REV: CPT | Performed by: STUDENT IN AN ORGANIZED HEALTH CARE EDUCATION/TRAINING PROGRAM

## 2021-07-06 PROCEDURE — 4004F PT TOBACCO SCREEN RCVD TLK: CPT | Performed by: STUDENT IN AN ORGANIZED HEALTH CARE EDUCATION/TRAINING PROGRAM

## 2021-07-06 PROCEDURE — 99213 OFFICE O/P EST LOW 20 MIN: CPT | Performed by: STUDENT IN AN ORGANIZED HEALTH CARE EDUCATION/TRAINING PROGRAM

## 2021-07-06 PROCEDURE — G8427 DOCREV CUR MEDS BY ELIG CLIN: HCPCS | Performed by: STUDENT IN AN ORGANIZED HEALTH CARE EDUCATION/TRAINING PROGRAM

## 2021-07-06 RX ORDER — CARBAMAZEPINE 200 MG/1
200 TABLET, EXTENDED RELEASE ORAL 2 TIMES DAILY
Qty: 90 TABLET | Refills: 0 | Status: ON HOLD
Start: 2021-07-06 | End: 2021-08-03 | Stop reason: HOSPADM

## 2021-07-06 RX ORDER — TOPIRAMATE 25 MG/1
50 TABLET ORAL DAILY
Qty: 60 TABLET | Refills: 3 | Status: ON HOLD
Start: 2021-07-06 | End: 2021-08-03 | Stop reason: HOSPADM

## 2021-07-06 RX ORDER — DULOXETIN HYDROCHLORIDE 60 MG/1
60 CAPSULE, DELAYED RELEASE ORAL DAILY
Qty: 30 CAPSULE | Refills: 3 | Status: SHIPPED
Start: 2021-07-06 | End: 2021-07-16

## 2021-07-06 ASSESSMENT — ENCOUNTER SYMPTOMS
CONSTIPATION: 0
BACK PAIN: 1
DIARRHEA: 0
NAUSEA: 0
EYES NEGATIVE: 1
RESPIRATORY NEGATIVE: 1
BLOOD IN STOOL: 0
VOMITING: 0

## 2021-07-06 NOTE — PROGRESS NOTES
[address] [place] [address]  [facility] [id number] [facility]  Dept: [phone]  Dept Fax: [phone]    NEUROLOGY FOLLOW UP NOTE                                              PATIENT NAME: Cinda Wilkerson   PATIENT MRN: [id number]  FOLLOW UP TODAY: 7/6/2021        INITIAL & INTERVAL HISTORY:     Cinda Wilkerson is a 45 y.o. female here for follow up. She's been managed for peripheral neuropathy. She also has history of epilepsy, and CRPS     Peripheral neuropathy: Patient reports that she has been having pain and numbness for  the last 9 months. Pain involves her lower extremity more often that her upper extremities. Pain is electrical like, however, she does get soreness when she walks for long distances which lasts for 1 to 2 days. She also reports that she hasn't been feeling her feet. She has Hx of CRPS on left ankle since 2014 and she now feel the same symptoms on the right side. She was on gabapentin 300 mg TID when she presented to our clinic. She was switched to Lyrica 150 mg TID without significant improvement. Today, she reports that the pain is continuous, 5/10, worsens by walking and goes up to 8/10. She has significant history of alcohol use but she has been sober since March 2021. She recently started feeling gait unsteadiness. EMG was done and result was reviewed. Today, she report that her legs pain is still there. Lyrica was increased on last visit to 200 mg TID and helped but to a limited extent. Continue to complain of legs pain  Back pain. Muscle spasm with breathing difficulty. Went to her primary care and improved with toradol. She's on baclofen. It was low back pain. It radiates down to both sides. It's been ongoing      Restless leg syndrome:  She has a previous diagnosis of restless legt syndrome and is on requip 1 mg HS for 10 months. Continues to be on Requib which is helping with resltess leg.      Epilepsy: She had breath holding spells when she was a child. She's on tegretol 200 mg TID for seizures. Last seizure was reported to be last September 2020. No seizures since then. Seizure history: diagnosed with petite mal seizure as child. Previously on dilantin. No recent seizures. Last seizure event was around 1 year ago. She continues Tegretol 200 mg TID. She feels generalized fatigability. No dizziness. She has history of alcohol withdrawal seizures. She had petite mall seizures. Then had seizures again 1 year ago that she believes were related to alcohol withdrawal. She was started on tegretol by her psychiatrist at [facility] rehab center      Gait unsteadiness: . Back pain radiating to bilateral lower extremities. Sometimes cramps and sometimes sharp pain. Mostly 5/10. Also complains of neck pain, cramping, sometimes going to her hands. She continues to feel numbness of her feet bilaterally. Continue to have unsteadiness which is worsened by her pain. Low back pain  Patient complains of low back pain that radiates to her buttock bilaterally. She was seen by pain management who referred her for physical therapy with plan to get lumbar MRI if physical therapy does not improve her back pain      Migraine Headache: She complains of headache that she gets once every one or two week. It's usually mild to medium and resolves with tylenol or Advil. 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 She reports that she wakes up with headache every morning for the last week. Headache is occipital and radiates occasionally into er jaws bilaterally . Headache is sharp and sometimes throbbing. There's photophobia and phonophobia. It's same character but has become more frequent. Previous work up:   MRI brain w/o on 10/28/2019: Stable nonspecific T2 lengthening right parietal white matter. No acute disease.        PM    has a past medical history of Acute respiratory failure with hypoxia ([facility] [address] Alcohol withdrawal syndrome, with delirium ([facility] [address] Alcoholism ([facility] [address] Anemia, Astrocytoma ([address] - 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24, the patient underwent 2 surgical resections without known recurrence, Closed fracture of lateral portion of left tibial plateau, COPD (chronic obstructive pulmonary disease) ([address] Depression, Dysphagia, GI bleed, Hypertension, Memory loss, Oxygen dependent, Pain, joint, ankle and foot, Pancreatic lesion, Peripheral neuropathy, Seizures ([facility] [address] Tension headache, Under care of team, Under care of team, Under care of team, Under care of team, Under care of team, and Wellness examination. PSH/SH/FMH: Remain unchanged since last visit 4/6/2021. ALLERGIES:   No Known Allergies    MEDICATIONS:   Current Outpatient Medications   Medication Sig Dispense Refill    pregabalin (LYRICA) 200 MG capsule Take 1 capsule by mouth 3 times daily for 90 days.  90 capsule 2    KLOR-CON M20 20 MEQ extended release tablet TAKE ONE TABLET BY MOUTH DAILY 30 tablet 2    sertraline (ZOLOFT) 100 MG tablet TAKE TWO TABLETS BY MOUTH DAILY 60 tablet 0    amLODIPine (NORVASC) 5 MG tablet TAKE ONE TABLET BY MOUTH DAILY 90 tablet 0    busPIRone (BUSPAR) 15 MG tablet Take 1 tablet by mouth 3 times daily      baclofen (LIORESAL) 10 MG tablet Take 1 tablet by mouth 3 times daily 60 tablet 1    ibuprofen (ADVIL;MOTRIN) 600 MG tablet Take 1 tablet by mouth 3 times daily as needed for Pain 30 tablet 0    carBAMazepine (TEGRETOL XR) 200 MG extended release tablet TAKE ONE TABLET BY MOUTH THREE TIMES A DAY 90 tablet 2    traZODone (DESYREL) 50 MG tablet TAKE 1 AND 1/2 TABLET BY MOUTH ONCE NIGHTLY AS NEEDED FOR SLEEP 45 tablet 0    pyridoxine 250 MG TABS Take 250 mg by mouth daily 180 tablet 1    ferrous sulfate (IRON 325) 325 (65 Fe) MG tablet Take 1 tablet by mouth 2 times daily 180 tablet 1    rOPINIRole (REQUIP) 1 MG tablet Take 1 tablet by mouth nightly 90 tablet 1    budesonide-formoterol (SYMBICORT) 160-4.5 MCG/ACT AERO Inhale 2 puffs into the lungs 2 times daily 3 Inhaler 1    hydrOXYzine (ATARAX) 50 MG tablet TAKE ONE TABLET BY MOUTH THREE TIMES A DAY 90 tablet 3    sodium chloride (ALTAMIST SPRAY) 0.65 % nasal spray 1 spray by Nasal route as needed for Congestion 1 Bottle 3    ACETAMINOPHEN EXTRA STRENGTH 500 MG tablet Take 500 mg by mouth 3 times daily as needed      albuterol sulfate HFA (VENTOLIN HFA) 108 (90 Base) MCG/ACT inhaler Inhale 2 puffs into the lungs 4 times daily as needed for Wheezing 1 Inhaler 5    vitamin B-1 (THIAMINE) 100 MG tablet Take 1 tablet by mouth daily 30 tablet 3    vitamin D (ERGOCALCIFEROL) 76483 units CAPS capsule Take 1 capsule by mouth once a week 12 capsule 1    folic acid (FOLVITE) 1 MG tablet Take 1 tablet by mouth daily 90 tablet 1    acamprosate (CAMPRAL) 333 MG tablet Take 2 tablets by mouth 3 times daily 180 tablet 0    tiotropium (SPIRIVA RESPIMAT) 2.5 MCG/ACT AERS inhaler Inhale 2 puffs into the lungs daily 1 Inhaler 11     No current facility-administered medications for this visit. LABS & TESTS:      Lab Results   Component Value Date    WBC 7.0 12/23/2020    HGB 14.7 07/01/2021    HCT 44.6 07/01/2021    MCV 96.1 12/23/2020     (H) 12/23/2020       REVIEW OF SYSTEMS:     Review of Systems   Constitutional: Negative. HENT: Negative. Eyes: Negative. Respiratory: Negative. Cardiovascular: Negative. Gastrointestinal: Negative for blood in stool, constipation, diarrhea, nausea and vomiting. Endocrine: Negative. Musculoskeletal: Positive for back pain, gait problem and neck pain. Skin: Negative. Neurological: Positive for weakness and headaches. Hematological: Negative. Psychiatric/Behavioral: Positive for sleep disturbance. Negative for self-injury and suicidal ideas.         VITALS  /89   Pulse 85   Ht 5' 5\" (1.651 m)   Wt 137 lb (62.1 kg)   SpO2 94%   BMI 22.80 kg/m²     PHYSICAL EXAMINATION:     Physical Exam  Constitutional:       Appearance: Normal appearance. HENT:      Head: Normocephalic and atraumatic. Eyes:      General: No visual field deficit. Extraocular Movements: Extraocular movements intact. Pupils: Pupils are equal, round, and reactive to light. Cardiovascular:      Rate and Rhythm: Normal rate and regular rhythm. Pulmonary:      Effort: Pulmonary effort is normal.      Breath sounds: Normal breath sounds. Abdominal:      General: Abdomen is flat. Palpations: Abdomen is soft. Musculoskeletal:      Cervical back: Normal range of motion and neck supple. Comments: Straight leg test is negative   Neurological:      Mental Status: She is alert and oriented to person, place, and time. GCS: GCS eye subscore is 4. GCS verbal subscore is 5. GCS motor subscore is 6. Cranial Nerves: Cranial nerves are intact. No cranial nerve deficit, dysarthria or facial asymmetry. Deep Tendon Reflexes: Strength normal. Babinski sign absent on the right side. Babinski sign absent on the left side. Reflex Scores:       Tricep reflexes are 2+ on the right side and 2+ on the left side. Bicep reflexes are 2+ on the right side and 2+ on the left side. Brachioradialis reflexes are 2+ on the right side and 2+ on the left side. Patellar reflexes are 2+ on the right side and 2+ on the left side. Achilles reflexes are 2+ on the right side and 2+ on the left side. Psychiatric:         Speech: Speech normal.          Neurologic Exam     Mental Status   Oriented to person, place, and time. Oriented to person. Oriented to place. Oriented to country and city. Oriented to time. Oriented to year, month, date, day and season. Registration: recalls 3 of 3 objects. Follows 3 step commands. Attention: normal. Concentration: normal.   Speech: speech is normal   Level of consciousness: alert    Cranial Nerves   Cranial nerves II through XII intact.      CN III, IV, VI Pupils are equal, round, and reactive to light. Motor Exam   Muscle bulk: normal  Right arm tone: normal  Left arm tone: normal  Right leg tone: normal  Left leg tone: normal    Strength   Strength 5/5 throughout. Sensory Exam   Right arm light touch: normal  Left arm light touch: normal  Right leg light touch: decreased from knee  Left leg light touch: decreased from knee  Right arm vibration: normal  Left arm vibration: normal  Right leg vibration: decreased from knee  Left leg vibration: decreased from knee  Right arm proprioception: normal  Left arm proprioception: normal  Right leg proprioception: normal  Left leg proprioception: normal  Right arm pinprick: normal  Left arm pinprick: normal  Right leg pinprick: decreased from knee  Left leg pinprick: decreased from knee    Gait, Coordination, and Reflexes     Gait  Gait: wide-based    Tremor   Resting tremor: absent  Intention tremor: absent  Action tremor: absent    Reflexes   Right brachioradialis: 2+  Left brachioradialis: 2+  Right biceps: 2+  Left biceps: 2+  Right triceps: 2+  Left triceps: 2+  Right patellar: 2+  Left patellar: 2+  Right achilles: 2+  Left achilles: 2+  Right plantar: normal  Left plantar: normal  Right Freedman: absent  Left Freedman: absent  Right ankle clonus: absent  Left ankle clonus: absent  Right pendular knee jerk: absent  Left pendular knee jerk: absent    Mini-Mental Status examination  Date orientation  5/5  Place orientation  5/5  Parkman 3 objects 3/3  Serial sevens or backward spelling  5/5  Recall 3 objects 2/3  Naming 2/2  Repeating a phrase  1/1  Verbal commands 3/3  Written commands  1/1  Writing 1/1  Drawing 1/1  Total score 29/30    d    Work up :      BRADLEY negative   ANCA negative   A1C 5.5   Vitamin B6 13.6 (low)  Vitamin B1 140   B12 699  FA >20.0  Vitamin E WNL   ESR 37  CRP 12.1   SPEP WNL   Cryoglobulin 0    EMG/NCS 6/1/2021  Lower extremities: Bilateral sural sensory's were unobtainable.   Left peroneal and right tibial borderline conduction velocities and decreased amplitudes. Left tibial appeared within normal.  F responses: Borderline normal interval extremities. Upper extremities: NCS WNL  EMGs: No acute denervation. Some chronic denervation changes involving left TBS anterior, gluteus medius, left lumbosacral paraspinals. ,  Left deltoid and biceps also showed some mild chronic denervation changes. Left dorsal interossei of the foot showed fibrous tissue. Assessment:  Sensory greater than motor peripheral polyneuropathy of questionable etiology  Evidence suggestive of chronic left L4-L5 radiculopathy and possible left C5-C6 radiculopathy. ASSESSMENT / PLAN:      1. Peripheral and sensory neuropathy. Length dependent, symmetrical, with allodynia. Due to alacohol use vitamin B6 deficiency   Continue Vitamin B6 at 250 mg daily  Recheck vitamin B6 level   Obtain vitamin A and vitamin D levels  Continue Lyrica at 200 mg TID. Start duloxetine 60 mg daily  Discontinue sertraline due to concern for serotonin syndrome. She stated that she will reach out to her psychiatrist beforehand to confirm discontinuing sertraline. Recounseled about the importance of abstinence. Last drink 3/31/2021.     2. Low back pain, Left L5-L6 radiculopathy. · Follows up with pain management. Physical therapy with plan for MRI afterwards    3. Epilepsy vs. Alcohol withdrawal seizures   She is on Tegretol 200 mg 3 times daily  Mild hyponatremia 132 due to Tegretol  Decreased to 20 mg twice daily in order to switch to Topamax. Start Topamax at 50 mg twice daily for both migraine headache and seizures   She has history of astrocytoma status post resection which puts her at increased risk for seizures MRI brain    4. Pseudo-dementia   Continue management of depression by psychiatry     7. Migraine headache    Topamax 50 mg twice daily as above    Ms. Skinny Castaneda received counseling on the following healthy behaviors: medical compliance, smoking cessation, blood pressure control, regular follow up with primary doctor.       Follow-up in 2 weeks     Geronimo Tam MD  PGY-4, Neurology     Electronically signed by Dedrick Heard MD on 7/6/2021 at 1:23 PM

## 2021-07-08 ENCOUNTER — OFFICE VISIT (OUTPATIENT)
Dept: FAMILY MEDICINE CLINIC | Age: 51
End: 2021-07-08
Payer: MEDICARE

## 2021-07-08 ENCOUNTER — HOSPITAL ENCOUNTER (OUTPATIENT)
Age: 51
Setting detail: SPECIMEN
Discharge: HOME | End: 2021-07-08
Payer: MEDICARE

## 2021-07-08 VITALS
TEMPERATURE: 98.5 F | SYSTOLIC BLOOD PRESSURE: 118 MMHG | HEART RATE: 92 BPM | DIASTOLIC BLOOD PRESSURE: 80 MMHG | OXYGEN SATURATION: 96 % | WEIGHT: 139.6 LBS | BODY MASS INDEX: 23.23 KG/M2

## 2021-07-08 DIAGNOSIS — E87.1 HYPONATREMIA: ICD-10-CM

## 2021-07-08 DIAGNOSIS — Z01.818 PREOP EXAM FOR INTERNAL MEDICINE: Primary | ICD-10-CM

## 2021-07-08 DIAGNOSIS — G62.9 PERIPHERAL POLYNEUROPATHY: ICD-10-CM

## 2021-07-08 LAB
ANION GAP SERPL CALCULATED.3IONS-SCNC: 11 MMOL/L (ref 9–17)
BUN BLDV-MCNC: 5 MG/DL (ref 6–20)
BUN/CREAT BLD: ABNORMAL (ref 9–20)
CALCIUM SERPL-MCNC: 9 MG/DL (ref 8.6–10.4)
CHLORIDE BLD-SCNC: 92 MMOL/L (ref 98–107)
CO2: 26 MMOL/L (ref 20–31)
CREAT SERPL-MCNC: 0.44 MG/DL (ref 0.5–0.9)
GFR AFRICAN AMERICAN: >60 ML/MIN
GFR NON-AFRICAN AMERICAN: >60 ML/MIN
GFR SERPL CREATININE-BSD FRML MDRD: ABNORMAL ML/MIN/{1.73_M2}
GFR SERPL CREATININE-BSD FRML MDRD: ABNORMAL ML/MIN/{1.73_M2}
GLUCOSE BLD-MCNC: 103 MG/DL (ref 70–99)
POTASSIUM SERPL-SCNC: 4.5 MMOL/L (ref 3.7–5.3)
SODIUM BLD-SCNC: 129 MMOL/L (ref 135–144)
VITAMIN D 25-HYDROXY: 55.7 NG/ML (ref 30–100)

## 2021-07-08 PROCEDURE — 4004F PT TOBACCO SCREEN RCVD TLK: CPT | Performed by: INTERNAL MEDICINE

## 2021-07-08 PROCEDURE — G8427 DOCREV CUR MEDS BY ELIG CLIN: HCPCS | Performed by: INTERNAL MEDICINE

## 2021-07-08 PROCEDURE — G8420 CALC BMI NORM PARAMETERS: HCPCS | Performed by: INTERNAL MEDICINE

## 2021-07-08 PROCEDURE — 3017F COLORECTAL CA SCREEN DOC REV: CPT | Performed by: INTERNAL MEDICINE

## 2021-07-08 PROCEDURE — 99214 OFFICE O/P EST MOD 30 MIN: CPT | Performed by: INTERNAL MEDICINE

## 2021-07-08 NOTE — PATIENT INSTRUCTIONS
Stop ibuprofen till after surgery, check with Dr Ezekiel Corona regarding when OK to restart that. I will let you know once I have the results of the sodium level back.

## 2021-07-08 NOTE — PROGRESS NOTES
Preoperative Consultation      Frances Waters  YOB: 1970    Date of Service:  7/8/2021    Vitals:    07/08/21 1159   BP: 118/80   Site: Left Upper Arm   Position: Sitting   Cuff Size: Medium Adult   Pulse: 92   Temp: 98.5 °F (36.9 °C)   TempSrc: Temporal   SpO2: 96%   Weight: 139 lb 9.6 oz (63.3 kg)      Wt Readings from Last 2 Encounters:   07/08/21 139 lb 9.6 oz (63.3 kg)   07/06/21 137 lb (62.1 kg)     BP Readings from Last 3 Encounters:   07/08/21 118/80   07/06/21 134/89   07/01/21 (!) 130/91        Chief Complaint   Patient presents with    Surgical Consult     No Known Allergies  Outpatient Medications Marked as Taking for the 7/8/21 encounter (Office Visit) with Carrol Eason MD   Medication Sig Dispense Refill    carBAMazepine (TEGRETOL XR) 200 MG extended release tablet Take 1 tablet by mouth 2 times daily 90 tablet 0    topiramate (TOPAMAX) 25 MG tablet Take 2 tablets by mouth daily 60 tablet 3    pregabalin (LYRICA) 200 MG capsule Take 1 capsule by mouth 3 times daily for 90 days.  90 capsule 2    KLOR-CON M20 20 MEQ extended release tablet TAKE ONE TABLET BY MOUTH DAILY 30 tablet 2    amLODIPine (NORVASC) 5 MG tablet TAKE ONE TABLET BY MOUTH DAILY 90 tablet 0    busPIRone (BUSPAR) 15 MG tablet Take 1 tablet by mouth 3 times daily      baclofen (LIORESAL) 10 MG tablet Take 1 tablet by mouth 3 times daily 60 tablet 1    ibuprofen (ADVIL;MOTRIN) 600 MG tablet Take 1 tablet by mouth 3 times daily as needed for Pain 30 tablet 0    traZODone (DESYREL) 50 MG tablet TAKE 1 AND 1/2 TABLET BY MOUTH ONCE NIGHTLY AS NEEDED FOR SLEEP 45 tablet 0    acamprosate (CAMPRAL) 333 MG tablet Take 2 tablets by mouth 3 times daily 180 tablet 0    pyridoxine 250 MG TABS Take 250 mg by mouth daily 180 tablet 1    ferrous sulfate (IRON 325) 325 (65 Fe) MG tablet Take 1 tablet by mouth 2 times daily 180 tablet 1    rOPINIRole (REQUIP) 1 MG tablet Take 1 tablet by mouth nightly 90 tablet 1     Tobacco use    Ambulatory dysfunction    Seizures (HCC)    Paresthesia of lower extremity    Pancreatic cyst    Recurrent major depressive disorder (HCC)    Elevated CA 19-9 level    Perineal mass, female    Esophagitis candidal     Condyloma    Astrocytoma (HCC) - 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24, the patient underwent 2 surgical resections without known recurrence    Alcohol use disorder, severe, in early remission ([address]    Acute metabolic encephalopathy    AMAN (generalized anxiety disorder)    Major depressive disorder, recurrent severe without psychotic features (HCC)    Esophageal dysphagia    Chronic peripheral neuropathic pain    History of alcohol abuse    History of petit-mal seizures    Intractable episodic tension-type headache    Personal history of astrocytoma    Multilevel spine pain    Chronic pain syndrome    Marijuana abuse       Past Medical History:   Diagnosis Date    Acute respiratory failure with hypoxia ([address] 10/16/2020    Alcohol withdrawal syndrome, with delirium ([address] 12/14/2019    Alcoholism ([address]     Anemia 10/2020    GI bleed    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24, the patient underwent 2 surgical resections without known recurrence 10/23/2020    Closed fracture of lateral portion of left tibial plateau 95/69/0826    COPD (chronic obstructive pulmonary disease) ([facility]     CO2 retainer, on Bipap at night for this, Dr. Jd Mancia ( last visit 11/20/2020 and note on chart )    Depression     bipolar, major depressive disorder, ptsd, anxiety    Dysphagia     GI bleed 10/2020    Hypertension     Memory loss     Oxygen dependent     pt stated not needed as of 12/9/2020    Pain, joint, ankle and foot     Pancreatic lesion 10/2020    Dr. Aury Allen working up pt    Peripheral neuropathy     Seizures ([address]     also baseline tremors-last sz summer 2020    Tension headache     Under care of team 07/01/2021    [facility]-last visit june 2021    Under care Substance and Sexual Activity    Alcohol use: Not Currently     Alcohol/week: 0.0 standard drinks    Drug use: Yes     Frequency: 2.0 times per week     Types: Marijuana    Sexual activity: Not on file   Other Topics Concern    Not on file   Social History Narrative    Not on file     Social Determinants of Health     Financial Resource Strain: Medium Risk    Difficulty of Paying Living Expenses: Somewhat hard   Food Insecurity: No Food Insecurity    Worried About Running Out of Food in the Last Year: Never true    Tyler of Food in the Last Year: Never true   Transportation Needs:     Lack of Transportation (Medical):  Lack of Transportation (Non-Medical):    Physical Activity:     Days of Exercise per Week:     Minutes of Exercise per Session:    Stress:     Feeling of Stress :    Social Connections:     Frequency of Communication with Friends and Family:     Frequency of Social Gatherings with Friends and Family:     Attends Scientologist Services:     Active Member of Clubs or Organizations:     Attends Club or Organization Meetings:     Marital Status:    Intimate Partner Violence:     Fear of Current or Ex-Partner:     Emotionally Abused:     Physically Abused:     Sexually Abused:        Review of Systems  A comprehensive review of systems was negative except for what was noted in the HPI. Physical Exam   Constitutional: She is oriented to person, place, and time. She appears well-developed and well-nourished. No distress. HENT:   Head: Normocephalic and atraumatic. Mouth/Throat: Uvula is midline, oropharynx is clear and moist and mucous membranes are normal.   Eyes: Conjunctivae and EOM are normal. Pupils are equal, round, and reactive to light. Neck: Trachea normal and normal range of motion. Neck supple. No JVD present. Carotid bruit is not present. No mass and no thyromegaly present.    Cardiovascular: Normal rate, regular rhythm, normal heart sounds and intact distal pulses. Exam reveals no gallop and no friction rub. No murmur heard. Pulmonary/Chest: Effort normal and breath sounds normal. No respiratory distress. She has no wheezes. She has no rales. Abdominal: Soft. Normal aorta and bowel sounds are normal. She exhibits no distension and no mass. There is no hepatosplenomegaly. No tenderness. Musculoskeletal: She exhibits no edema and no tenderness. Neurological: She is alert and oriented to person, place, and time. She has normal strength. No cranial nerve deficit or sensory deficit. Coordination and gait normal.   Skin: Skin is warm and dry. No rash noted. No erythema. Psychiatric: She has a normal mood and affect. Her behavior is normal.     EKG Interpretation:  normal EKG, normal sinus rhythm, unchanged from previous tracings.     Lab Review   Hospital Outpatient Visit on 07/01/2021   Component Date Value    Ventricular Rate 07/01/2021 74     Atrial Rate 07/01/2021 74     P-R Interval 07/01/2021 242     QRS Duration 07/01/2021 88     Q-T Interval 07/01/2021 378     QTc Calculation (Bazett) 07/01/2021 419     P Axis 07/01/2021 69     R Axis 07/01/2021 74     T Axis 07/01/2021 74     Sodium 07/01/2021 132*    Potassium 07/01/2021 4.4     Chloride 07/01/2021 92*    CO2 07/01/2021 25     Anion Gap 07/01/2021 15     Glucose 07/01/2021 65*    BUN 07/01/2021 7     CREATININE 07/01/2021 0.55     GFR Non- 07/01/2021 >60     GFR  07/01/2021 >60     GFR Comment 07/01/2021          GFR Staging 07/01/2021 NOT REPORTED     Hemoglobin 07/01/2021 14.7     Hematocrit 07/01/2021 44.6    Hospital Outpatient Visit on 05/24/2021   Component Date Value    POC Creatinine 05/24/2021 0.53     GFR Comment 05/24/2021 >60     GFR Non- 05/24/2021 >60     GFR Comment 05/24/2021         Hospital Outpatient Visit on 05/19/2021   Component Date Value    Status 05/19/2021 [facility] Outpatient Visit on 05/20/2021   Component Date Value    SARS-CoV-2 05/20/2021          Source 05/20/2021 . NASOPHARYNGEAL SWAB     SARS-CoV-2 05/20/2021 Not Detected            Assessment:       45 y.o. patient with planned surgery as above. Known risk factors for perioperative complications: Alcoholism, COPD  Current medications which may produce withdrawal symptoms if withheld perioperatively: none      Plan:     1. Preoperative workup as follows: labs in chart, EKG done   2. Change in medication regimen before surgery: Discontinue NSAIDs (ibuprofen) 5 days before surgery  3. Prophylaxis for cardiac events with perioperative beta-blockers: Not indicated  ACC/AHA indications for pre-operative beta-blocker use:    · Vascular surgery with history of postitive stress test  · Intermediate or high risk surgery with history of CAD   · Intermediate or high risk surgery with multiple clinical predictors of CAD- 2 of the following: history of compensated or prior heart failure, history of cerebrovascular disease, DM, or renal insufficiency    Routine administration of higher-dose, long-acting metoprolol in beta-blocker-naïve patients on the day of surgery, and in the absence of dose titration is associated with an overall increase in mortality. Beta-blockers should be started days to weeks prior to surgery and titrated to pulse < 70.  4. Deep vein thrombosis prophylaxis: regimen to be chosen by surgical team  5.  Repeat BMP today due to hyponatremia, will clear if stable again

## 2021-07-09 ENCOUNTER — TELEPHONE (OUTPATIENT)
Dept: FAMILY MEDICINE CLINIC | Age: 51
End: 2021-07-09

## 2021-07-09 DIAGNOSIS — E87.1 HYPONATREMIA: Primary | ICD-10-CM

## 2021-07-09 RX ORDER — SOD.CHLORID/POTASSIUM CHLORIDE 287-180-15
1 TABLET ORAL DAILY
Qty: 3 TABLET | Refills: 0 | Status: ON HOLD | OUTPATIENT
Start: 2021-07-09 | End: 2021-08-03 | Stop reason: HOSPADM

## 2021-07-11 LAB
RETINYL PALMITATE: 0.02 MG/L (ref 0–0.1)
VITAMIN A LEVEL: 0.47 MG/L (ref 0.3–1.2)
VITAMIN A, INTERP: NORMAL

## 2021-07-12 ENCOUNTER — HOSPITAL ENCOUNTER (OUTPATIENT)
Age: 51
Setting detail: SPECIMEN
Discharge: HOME | End: 2021-07-12
Payer: MEDICARE

## 2021-07-12 DIAGNOSIS — E87.1 HYPONATREMIA: ICD-10-CM

## 2021-07-12 LAB
OSMOLALITY URINE: 372 MOSM/KG (ref 80–1300)
SERUM OSMOLALITY: 297 MOSM/KG (ref 275–295)
SODIUM BLD-SCNC: 134 MMOL/L (ref 135–144)
SODIUM,UR: 33 MMOL/L

## 2021-07-12 NOTE — TELEPHONE ENCOUNTER
Clarified with Dr. Dylon Overton, unfortunately we can not clear her for surgery until we can bring sodium levels up to WNL.

## 2021-07-12 NOTE — TELEPHONE ENCOUNTER
Spoke with pharmacy, salt tabs had to be ordered, they will be there this morning. Spoke with patient she states she will come in today to get labs drawn to see where her levels are.  I explained we can not clear her for surgery until her sodium level is back to normal.

## 2021-07-13 ENCOUNTER — TELEPHONE (OUTPATIENT)
Dept: NEUROSURGERY | Age: 51
End: 2021-07-13

## 2021-07-13 LAB — VITAMIN B6: 184.4 NMOL/L (ref 20–125)

## 2021-07-13 NOTE — TELEPHONE ENCOUNTER
Pt would like to know if she should stop taking the B6 due to her results showing a high level? Please Advise:      Component Value Ref Range & Units Status Collected Lab   Vitamin B6 184. 4High   20.0 - 125.0 nmol/L Final 07/08/2021 11:34 AM RELL

## 2021-07-14 ENCOUNTER — TELEPHONE (OUTPATIENT)
Dept: FAMILY MEDICINE CLINIC | Age: 51
End: 2021-07-14

## 2021-07-14 DIAGNOSIS — E87.1 HYPONATREMIA: Primary | ICD-10-CM

## 2021-07-14 NOTE — TELEPHONE ENCOUNTER
Chinyere Pérez, LOI - NP  P [facility] Clinical Support Pool  Please call her and let her know I spoke to Dr. Ruben White not make any changes to her medications, she has been on them for quite some time and we do not want any break through seizures. It could be that her sodium level is a result of medications, we would like to repeat NA lab in 1 week to see where we stand. If she still has those salt tabs just hold on to them, she may need them closer to her rescheduled surgery.    For now, work on diet - take in a little more sodium like we discussed before.  Saltine crackers, chicken broth, beef broth.  Sugar free gatorades work too.    Lab order is in!

## 2021-07-14 NOTE — TELEPHONE ENCOUNTER
Patient called with concern of medications. States they were prescribed by her neurologist but she can never get a response from them. She states she is on Cymbalta and Topamax. She found out both of those can cause low sodium. She stated her mom also did some digging and found out both of those medications can cause an interaction with Tegretol and cause seizures.     Patient is asking for you opinion due to having a hard time with neurology

## 2021-07-16 ENCOUNTER — TELEMEDICINE (OUTPATIENT)
Dept: PSYCHIATRY | Age: 51
End: 2021-07-16
Payer: MEDICARE

## 2021-07-16 DIAGNOSIS — F41.1 GAD (GENERALIZED ANXIETY DISORDER): ICD-10-CM

## 2021-07-16 DIAGNOSIS — F33.1 MODERATE EPISODE OF RECURRENT MAJOR DEPRESSIVE DISORDER (HCC): Primary | ICD-10-CM

## 2021-07-16 DIAGNOSIS — F10.21 ALCOHOL USE DISORDER, SEVERE, IN EARLY REMISSION (HCC): ICD-10-CM

## 2021-07-16 PROCEDURE — 99214 OFFICE O/P EST MOD 30 MIN: CPT | Performed by: NURSE PRACTITIONER

## 2021-07-16 RX ORDER — FLUOXETINE 20 MG/1
TABLET, FILM COATED ORAL
Qty: 27 TABLET | Refills: 0 | Status: ON HOLD
Start: 2021-07-16 | End: 2021-08-03 | Stop reason: HOSPADM

## 2021-07-16 RX ORDER — TRAZODONE HYDROCHLORIDE 50 MG/1
TABLET ORAL
Qty: 45 TABLET | Refills: 0 | Status: ON HOLD
Start: 2021-07-16 | End: 2021-08-03 | Stop reason: HOSPADM

## 2021-07-16 RX ORDER — ACAMPROSATE CALCIUM 333 MG/1
666 TABLET, DELAYED RELEASE ORAL 3 TIMES DAILY
Qty: 180 TABLET | Refills: 0 | Status: SHIPPED | OUTPATIENT
Start: 2021-07-16 | End: 2021-08-05 | Stop reason: SDUPTHER

## 2021-07-16 NOTE — PROGRESS NOTES
ideation  Homicide Assessment: denies current homicidal ideation, plan and intent    History:      Review of Systems:  Constitutional: Negative. HENT: Negative. Eyes: Negative. Respiratory: Negative. Cardiovascular: Negative. Gastrointestinal: Negative. Genitourinary: Negative . Musculoskeletal: Negative. Skin: Negative. Neurological: Negative. Endo/Heme/Allergies: Negative. Current Outpatient Medications:     acamprosate (CAMPRAL) 333 MG tablet, Take 2 tablets by mouth 3 times daily, Disp: 180 tablet, Rfl: 0    sertraline (ZOLOFT) 50 MG tablet, Take 3 tablets by mouth daily for 7 days, THEN 2 tablets daily for 7 days, THEN 1 tablet daily for 7 days. , Disp: 42 tablet, Rfl: 0    FLUoxetine (PROZAC) 20 MG tablet, Take 0.5 tablets by mouth daily for 7 days, THEN 1 tablet daily for 23 days. , Disp: 27 tablet, Rfl: 0    traZODone (DESYREL) 50 MG tablet, TAKE 1 AND 1/2 TABLET BY MOUTH ONCE NIGHTLY AS NEEDED FOR SLEEP, Disp: 45 tablet, Rfl: 0    thermotabs (MEDI-LYTE) TABS tablet, Take 1 tablet by mouth daily for 3 days, Disp: 3 tablet, Rfl: 0    carBAMazepine (TEGRETOL XR) 200 MG extended release tablet, Take 1 tablet by mouth 2 times daily, Disp: 90 tablet, Rfl: 0    topiramate (TOPAMAX) 25 MG tablet, Take 2 tablets by mouth daily, Disp: 60 tablet, Rfl: 3    pregabalin (LYRICA) 200 MG capsule, Take 1 capsule by mouth 3 times daily for 90 days. , Disp: 90 capsule, Rfl: 2    KLOR-CON M20 20 MEQ extended release tablet, TAKE ONE TABLET BY MOUTH DAILY, Disp: 30 tablet, Rfl: 2    amLODIPine (NORVASC) 5 MG tablet, TAKE ONE TABLET BY MOUTH DAILY, Disp: 90 tablet, Rfl: 0    busPIRone (BUSPAR) 15 MG tablet, Take 1 tablet by mouth 3 times daily, Disp: , Rfl:     baclofen (LIORESAL) 10 MG tablet, Take 1 tablet by mouth 3 times daily, Disp: 60 tablet, Rfl: 1    ibuprofen (ADVIL;MOTRIN) 600 MG tablet, Take 1 tablet by mouth 3 times daily as needed for Pain, Disp: 30 tablet, Rfl: 0    ferrous sulfate (IRON 325) 325 (65 Fe) MG tablet, Take 1 tablet by mouth 2 times daily, Disp: 180 tablet, Rfl: 1    rOPINIRole (REQUIP) 1 MG tablet, Take 1 tablet by mouth nightly, Disp: 90 tablet, Rfl: 1    budesonide-formoterol (SYMBICORT) 160-4.5 MCG/ACT AERO, Inhale 2 puffs into the lungs 2 times daily, Disp: 3 Inhaler, Rfl: 1    hydrOXYzine (ATARAX) 50 MG tablet, TAKE ONE TABLET BY MOUTH THREE TIMES A DAY, Disp: 90 tablet, Rfl: 3    tiotropium (SPIRIVA RESPIMAT) 2.5 MCG/ACT AERS inhaler, Inhale 2 puffs into the lungs daily, Disp: 1 Inhaler, Rfl: 11    sodium chloride (ALTAMIST SPRAY) 0.65 % nasal spray, 1 spray by Nasal route as needed for Congestion, Disp: 1 Bottle, Rfl: 3    ACETAMINOPHEN EXTRA STRENGTH 500 MG tablet, Take 500 mg by mouth 3 times daily as needed, Disp: , Rfl:     albuterol sulfate HFA (VENTOLIN HFA) 108 (90 Base) MCG/ACT inhaler, Inhale 2 puffs into the lungs 4 times daily as needed for Wheezing, Disp: 1 Inhaler, Rfl: 5    vitamin B-1 (THIAMINE) 100 MG tablet, Take 1 tablet by mouth daily, Disp: 30 tablet, Rfl: 3    vitamin D (ERGOCALCIFEROL) 05928 units CAPS capsule, Take 1 capsule by mouth once a week, Disp: 12 capsule, Rfl: 1    folic acid (FOLVITE) 1 MG tablet, Take 1 tablet by mouth daily, Disp: 90 tablet, Rfl: 1     PDMP Monitoring:    Last PDMP Oscar as Reviewed [facility]):  Review User Review Instant Review Result   Valencia Sigala 7/16/2021  1:29 PM Reviewed PDMP [1]     Last Controlled Substance Monitoring Documentation      Telemedicine from 5/17/2021 in [address]   Periodic Controlled Substance Monitoring  No signs of potential drug abuse or diversion identified.  filed at 05/17/2021 1452        Urine Drug Screenings (1 yr)     Urine Drug Screen  Collected: 7/14/2019  1:03 PM (Final result)    Complete Results              Medication Contract and Consent for Opioid Use Documents Filed      No documents found                 [place]AR[place] checked and there were no signs of substance abuse, or prescription misuse. Social History     Socioeconomic History    Marital status: Single     Spouse name: Not on file    Number of children: Not on file    Years of education: Not on file    Highest education level: Not on file   Occupational History    Not on file   Tobacco Use    Smoking status: Current Every Day Smoker     Packs/day: 0.50     Years: 30.00     Pack years: 15.00     Types: Cigarettes    Smokeless tobacco: Never Used    Tobacco comment: plans on quitting in the future    Vaping Use    Vaping Use: Never used   Substance and Sexual Activity    Alcohol use: Not Currently     Alcohol/week: 0.0 standard drinks    Drug use: Yes     Frequency: 2.0 times per week     Types: Marijuana    Sexual activity: Not on file   Other Topics Concern    Not on file   Social History Narrative    Not on file     Social Determinants of Health     Financial Resource Strain: Medium Risk    Difficulty of Paying Living Expenses: Somewhat hard   Food Insecurity: No Food Insecurity    Worried About Running Out of Food in the Last Year: Never true    [place] of Food in the Last Year: Never true   Transportation Needs:     Lack of Transportation (Medical):  Lack of Transportation (Non-Medical):    Physical Activity:     Days of Exercise per Week:     Minutes of Exercise per Session:    Stress:     Feeling of Stress :    Social Connections:     Frequency of Communication with Friends and Family:     Frequency of Social Gatherings with Friends and Family:     Attends Buddhism Services:     Active Member of Clubs or Organizations:     Attends Club or Organization Meetings:     Marital Status:    Intimate Partner Violence:     Fear of Current or Ex-Partner:     Emotionally Abused:     Physically Abused:     Sexually Abused:        TOBACCO: Rhonda Grider  reports that she has been smoking cigarettes. She has a 15.00 pack-year smoking history.  She has never used smokeless tobacco.  ETOH: Barbara  reports previous alcohol use. Past Medical History:   Diagnosis Date    Acute respiratory failure with hypoxia ([facility] 10/16/2020    Alcohol withdrawal syndrome, with delirium ([facility] [facility] 12/14/2019    Alcoholism ([facility]     Anemia 10/2020    GI bleed    Astrocytoma ([facility] [facility] - 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24, the patient underwent 2 surgical resections without known recurrence 10/23/2020    Closed fracture of lateral portion of left tibial plateau 69/80/6676    COPD (chronic obstructive pulmonary disease) ([facility] [facility]     CO2 retainer, on Bipap at night for this, Dr. Yosi Blum ( last visit 11/20/2020 and note on chart )    Depression     bipolar, major depressive disorder, ptsd, anxiety    Dysphagia     GI bleed 10/2020    Hypertension     Memory loss     Oxygen dependent     pt stated not needed as of 12/9/2020    Pain, joint, ankle and foot     Pancreatic lesion 10/2020    Dr. Nasim Garduno working up pt    Peripheral neuropathy     Seizures ([facility]     also baseline tremors-last sz summer 2020    Tension headache     Under care of team 07/01/2021    neuro-Dr Wan-[facility]-last visit june 2021    Under care of team 07/01/2021    pain management-Hamzah jimenez-last visit june 2021    Under care of team 07/01/2021    pulmonology-Dr Dueñas-[facility]-last virtual visit feb 2021    Under care of team 07/01/2021    psych-bahnfeldt NP-telemed-last visit may 2021    Under care of team 07/01/2021    ck-[address]-last visit june 2021    Wellness examination 07/01/2021    pcp-Ludivina grimes-last visit may 0229      Metabolic monitoring is being done by PCP.    Family History   Problem Relation Age of Onset    Other Father     Cancer Maternal Grandmother     Heart Disease Paternal Grandmother     Esophageal Cancer Maternal Aunt      Last Labs:   Lab Results   Component Value Date    LABA1C 5.5 04/13/2021     Lab Results   Component Value Date     04/13/2021 Lab Results   Component Value Date    WBC 7.0 12/23/2020    HGB 14.7 07/01/2021    HCT 44.6 07/01/2021    MCV 96.1 12/23/2020     (H) 12/23/2020    LYMPHOPCT 37 12/23/2020    RBC 4.17 12/23/2020    MCH 32.0 12/23/2020    MCHC 33.3 12/23/2020    RDW 14.9 12/23/2020          Lab Results   Component Value Date     (L) 07/12/2021    K 4.5 07/08/2021    CL 92 (L) 07/08/2021    CO2 26 07/08/2021    BUN 5 (L) 07/08/2021    CREATININE 0.44 (L) 07/08/2021    GLUCOSE 103 (H) 07/08/2021    CALCIUM 9.0 07/08/2021    PROT 6.9 02/03/2021    LABALBU 3.3 (L) 12/23/2020    BILITOT 0.17 (L) 12/23/2020    ALKPHOS 164 (H) 12/23/2020    AST 24 12/23/2020    ALT 7 12/23/2020    LABGLOM >60 07/08/2021    GFRAA >60 07/08/2021    GLOB NOT REPORTED 07/13/2019      . last    Diagnosis:      1. Moderate episode of recurrent major depressive disorder ([facility] [facility]    2. AMAN (generalized anxiety disorder)    3. Alcohol use disorder, severe, in early remission ([facility] [address] [facility]      Plan:    Discussed cross-tapering sertraline for fluoxetine. Discussed risks and benefits of medications, as well as need for yearly lab work. Follow up with [facility] [place] for therapy. Continue work with PCP to manage medical concerns, and [facility] [employer] [facility] for continued follow-up. No orders of the defined types were placed in this encounter. Orders Placed This Encounter   Medications    acamprosate (CAMPRAL) 333 MG tablet     Sig: Take 2 tablets by mouth 3 times daily     Dispense:  180 tablet     Refill:  0    sertraline (ZOLOFT) 50 MG tablet     Sig: Take 3 tablets by mouth daily for 7 days, THEN 2 tablets daily for 7 days, THEN 1 tablet daily for 7 days. Dispense:  42 tablet     Refill:  0    FLUoxetine (PROZAC) 20 MG tablet     Sig: Take 0.5 tablets by mouth daily for 7 days, THEN 1 tablet daily for 23 days.      Dispense:  27 tablet     Refill:  0    traZODone (DESYREL) 50 MG tablet     Sig: TAKE 1 AND 1/2 TABLET BY MOUTH ONCE NIGHTLY AS NEEDED FOR SLEEP     Dispense:  45 tablet     Refill:  0       Pt interventions:    Discussed importance of medication adherence, Discussed risks, benefits, side effects of medication and need for follow up treatment, Discussed benefits of referral for specialty care and Discussed self-care (sleep, nutrition, rewarding activities, social support, exercise)

## 2021-07-20 ENCOUNTER — TELEPHONE (OUTPATIENT)
Dept: FAMILY MEDICINE CLINIC | Age: 51
End: 2021-07-20

## 2021-07-20 NOTE — TELEPHONE ENCOUNTER
Patient called stating she is having severe back pain and would like to come in today. Informed her we do not have any openings and she would need to go to a walk-in. Patient asked why the provider cannot squeeze her in today. Informed her again we do not have any openings and the best thing would be to go to the walk-in. Informed her if the pain is that severe then she should go to the ER. [facility]. Informed her she is not available. Patient stated she will call back and hung up.

## 2021-07-21 ENCOUNTER — APPOINTMENT (OUTPATIENT)
Dept: GENERAL RADIOLOGY | Age: 51
DRG: 720 | End: 2021-07-21
Payer: MEDICARE

## 2021-07-21 ENCOUNTER — APPOINTMENT (OUTPATIENT)
Dept: CT IMAGING | Age: 51
DRG: 720 | End: 2021-07-21
Payer: MEDICARE

## 2021-07-21 ENCOUNTER — HOSPITAL ENCOUNTER (INPATIENT)
Age: 51
LOS: 13 days | Discharge: HOME | DRG: 720 | End: 2021-08-03
Attending: EMERGENCY MEDICINE
Payer: MEDICARE

## 2021-07-21 DIAGNOSIS — A41.9 SEPTICEMIA (HCC): Primary | ICD-10-CM

## 2021-07-21 DIAGNOSIS — S32.000A COMPRESSION FRACTURE OF LUMBAR VERTEBRA, INITIAL ENCOUNTER, UNSPECIFIED LUMBAR VERTEBRAL LEVEL: ICD-10-CM

## 2021-07-21 DIAGNOSIS — S22.000A COMPRESSION FRACTURE OF THORACIC VERTEBRA, INITIAL ENCOUNTER, UNSPECIFIED THORACIC VERTEBRAL LEVEL: ICD-10-CM

## 2021-07-21 LAB
ABSOLUTE EOS #: 0 K/UL (ref 0–0.4)
ABSOLUTE IMMATURE GRANULOCYTE: 0 K/UL (ref 0–0.3)
ABSOLUTE LYMPH #: 1.08 K/UL (ref 1–4.8)
ABSOLUTE MONO #: 0.86 K/UL (ref 0.1–0.8)
ALBUMIN SERPL-MCNC: 4 G/DL (ref 3.5–5.2)
ALBUMIN/GLOBULIN RATIO: 0.9 (ref 1–2.5)
ALLEN TEST: ABNORMAL
ALP BLD-CCNC: 283 U/L (ref 35–104)
ALT SERPL-CCNC: 12 U/L (ref 5–33)
ANION GAP SERPL CALCULATED.3IONS-SCNC: 15 MMOL/L (ref 9–17)
ANION GAP: 11 MMOL/L (ref 7–16)
AST SERPL-CCNC: 24 U/L
ATYPICAL LYMPHOCYTE ABSOLUTE COUNT: 0.22 K/UL
ATYPICAL LYMPHOCYTES: 1 %
BASOPHILS # BLD: 1 % (ref 0–2)
BASOPHILS ABSOLUTE: 0.22 K/UL (ref 0–0.2)
BILIRUB SERPL-MCNC: 0.68 MG/DL (ref 0.3–1.2)
BUN BLDV-MCNC: 15 MG/DL (ref 6–20)
BUN/CREAT BLD: ABNORMAL (ref 9–20)
CALCIUM SERPL-MCNC: 10.3 MG/DL (ref 8.6–10.4)
CHLORIDE BLD-SCNC: 94 MMOL/L (ref 98–107)
CO2: 25 MMOL/L (ref 20–31)
CREAT SERPL-MCNC: 0.41 MG/DL (ref 0.5–0.9)
DIFFERENTIAL TYPE: ABNORMAL
EOSINOPHILS RELATIVE PERCENT: 0 % (ref 1–4)
FIO2: ABNORMAL
GFR AFRICAN AMERICAN: >60 ML/MIN
GFR NON-AFRICAN AMERICAN: >60 ML/MIN
GFR NON-AFRICAN AMERICAN: >60 ML/MIN
GFR SERPL CREATININE-BSD FRML MDRD: >60 ML/MIN
GFR SERPL CREATININE-BSD FRML MDRD: ABNORMAL ML/MIN/{1.73_M2}
GFR SERPL CREATININE-BSD FRML MDRD: ABNORMAL ML/MIN/{1.73_M2}
GFR SERPL CREATININE-BSD FRML MDRD: NORMAL ML/MIN/{1.73_M2}
GLUCOSE BLD-MCNC: 128 MG/DL (ref 70–99)
GLUCOSE BLD-MCNC: 141 MG/DL (ref 74–100)
HCG QUALITATIVE: NEGATIVE
HCO3 VENOUS: 27.5 MMOL/L (ref 22–29)
HCT VFR BLD CALC: 44.3 % (ref 36.3–47.1)
HEMOGLOBIN: 14.9 G/DL (ref 11.9–15.1)
IMMATURE GRANULOCYTES: 0 %
LACTIC ACID, SEPSIS WHOLE BLOOD: 1.2 MMOL/L (ref 0.5–1.9)
LACTIC ACID, SEPSIS WHOLE BLOOD: 1.4 MMOL/L (ref 0.5–1.9)
LACTIC ACID, SEPSIS: NORMAL MMOL/L (ref 0.5–1.9)
LACTIC ACID, SEPSIS: NORMAL MMOL/L (ref 0.5–1.9)
LACTIC ACID, WHOLE BLOOD: 1.1 MMOL/L (ref 0.7–2.1)
LYMPHOCYTES # BLD: 5 % (ref 24–44)
MCH RBC QN AUTO: 29.3 PG (ref 25.2–33.5)
MCHC RBC AUTO-ENTMCNC: 33.6 G/DL (ref 28.4–34.8)
MCV RBC AUTO: 87.2 FL (ref 82.6–102.9)
MODE: ABNORMAL
MONOCYTES # BLD: 4 % (ref 1–7)
MORPHOLOGY: NORMAL
NEGATIVE BASE EXCESS, VEN: ABNORMAL (ref 0–2)
NRBC AUTOMATED: 0 PER 100 WBC
O2 DEVICE/FLOW/%: ABNORMAL
O2 SAT, VEN: 55 % (ref 60–85)
PATIENT TEMP: ABNORMAL
PCO2, VEN: 42.8 MM HG (ref 41–51)
PDW BLD-RTO: 13.6 % (ref 11.8–14.4)
PH VENOUS: 7.42 (ref 7.32–7.43)
PLATELET # BLD: 462 K/UL (ref 138–453)
PLATELET ESTIMATE: ABNORMAL
PMV BLD AUTO: 9.7 FL (ref 8.1–13.5)
PO2, VEN: 28.7 MM HG (ref 30–50)
POC BUN: 14 MG/DL (ref 8–26)
POC CHLORIDE: 101 MMOL/L (ref 98–107)
POC CREATININE: 0.54 MG/DL (ref 0.51–1.19)
POC HEMATOCRIT: 50 % (ref 36–46)
POC HEMOGLOBIN: 17 G/DL (ref 12–16)
POC IONIZED CALCIUM: 1.18 MMOL/L (ref 1.15–1.33)
POC LACTIC ACID: 1.08 MMOL/L (ref 0.56–1.39)
POC PCO2 TEMP: ABNORMAL MM HG
POC PH TEMP: ABNORMAL
POC PO2 TEMP: ABNORMAL MM HG
POC POTASSIUM: 4.5 MMOL/L (ref 3.5–4.5)
POC SODIUM: 139 MMOL/L (ref 138–146)
POC TCO2: 28 MMOL/L (ref 22–30)
POSITIVE BASE EXCESS, VEN: 2 (ref 0–3)
POTASSIUM SERPL-SCNC: 4.6 MMOL/L (ref 3.7–5.3)
RBC # BLD: 5.08 M/UL (ref 3.95–5.11)
RBC # BLD: ABNORMAL 10*6/UL
SAMPLE SITE: ABNORMAL
SEG NEUTROPHILS: 89 % (ref 36–66)
SEGMENTED NEUTROPHILS ABSOLUTE COUNT: 19.12 K/UL (ref 1.8–7.7)
SODIUM BLD-SCNC: 134 MMOL/L (ref 135–144)
TOTAL CO2, VENOUS: ABNORMAL MMOL/L (ref 23–30)
TOTAL PROTEIN: 8.5 G/DL (ref 6.4–8.3)
WBC # BLD: 21.5 K/UL (ref 3.5–11.3)
WBC # BLD: ABNORMAL 10*3/UL

## 2021-07-21 PROCEDURE — 3209999900 CT LUMBAR SPINE TRAUMA RECONSTRUCTION

## 2021-07-21 PROCEDURE — 82105 ALPHA-FETOPROTEIN SERUM: CPT

## 2021-07-21 PROCEDURE — 96372 THER/PROPH/DIAG INJ SC/IM: CPT

## 2021-07-21 PROCEDURE — 85025 COMPLETE CBC W/AUTO DIFF WBC: CPT

## 2021-07-21 PROCEDURE — 6360000002 HC RX W HCPCS: Performed by: NURSE PRACTITIONER

## 2021-07-21 PROCEDURE — 84520 ASSAY OF UREA NITROGEN: CPT

## 2021-07-21 PROCEDURE — 80053 COMPREHEN METABOLIC PANEL: CPT

## 2021-07-21 PROCEDURE — 2580000003 HC RX 258: Performed by: STUDENT IN AN ORGANIZED HEALTH CARE EDUCATION/TRAINING PROGRAM

## 2021-07-21 PROCEDURE — 82803 BLOOD GASES ANY COMBINATION: CPT

## 2021-07-21 PROCEDURE — 84703 CHORIONIC GONADOTROPIN ASSAY: CPT

## 2021-07-21 PROCEDURE — 85014 HEMATOCRIT: CPT

## 2021-07-21 PROCEDURE — 6370000000 HC RX 637 (ALT 250 FOR IP): Performed by: NURSE PRACTITIONER

## 2021-07-21 PROCEDURE — 80051 ELECTROLYTE PANEL: CPT

## 2021-07-21 PROCEDURE — 82565 ASSAY OF CREATININE: CPT

## 2021-07-21 PROCEDURE — 83605 ASSAY OF LACTIC ACID: CPT

## 2021-07-21 PROCEDURE — 36415 COLL VENOUS BLD VENIPUNCTURE: CPT

## 2021-07-21 PROCEDURE — 2060000000 HC ICU INTERMEDIATE R&B

## 2021-07-21 PROCEDURE — 81001 URINALYSIS AUTO W/SCOPE: CPT

## 2021-07-21 PROCEDURE — 3209999900 CT THORACIC SPINE TRAUMA RECONSTRUCTION

## 2021-07-21 PROCEDURE — 71260 CT THORAX DX C+: CPT

## 2021-07-21 PROCEDURE — 87086 URINE CULTURE/COLONY COUNT: CPT

## 2021-07-21 PROCEDURE — 99284 EMERGENCY DEPT VISIT MOD MDM: CPT

## 2021-07-21 PROCEDURE — 6360000002 HC RX W HCPCS: Performed by: STUDENT IN AN ORGANIZED HEALTH CARE EDUCATION/TRAINING PROGRAM

## 2021-07-21 PROCEDURE — 2580000003 HC RX 258: Performed by: NURSE PRACTITIONER

## 2021-07-21 PROCEDURE — 99222 1ST HOSP IP/OBS MODERATE 55: CPT | Performed by: NURSE PRACTITIONER

## 2021-07-21 PROCEDURE — 96374 THER/PROPH/DIAG INJ IV PUSH: CPT

## 2021-07-21 PROCEDURE — 96361 HYDRATE IV INFUSION ADD-ON: CPT

## 2021-07-21 PROCEDURE — 6360000004 HC RX CONTRAST MEDICATION: Performed by: EMERGENCY MEDICINE

## 2021-07-21 PROCEDURE — 82330 ASSAY OF CALCIUM: CPT

## 2021-07-21 PROCEDURE — 87040 BLOOD CULTURE FOR BACTERIA: CPT

## 2021-07-21 PROCEDURE — 96375 TX/PRO/DX INJ NEW DRUG ADDON: CPT

## 2021-07-21 PROCEDURE — 86301 IMMUNOASSAY TUMOR CA 19-9: CPT

## 2021-07-21 PROCEDURE — 82947 ASSAY GLUCOSE BLOOD QUANT: CPT

## 2021-07-21 RX ORDER — VANCOMYCIN HYDROCHLORIDE 1 G/200ML
15 INJECTION, SOLUTION INTRAVENOUS ONCE
Status: COMPLETED | OUTPATIENT
Start: 2021-07-21 | End: 2021-07-21

## 2021-07-21 RX ORDER — FOLIC ACID 1 MG/1
1 TABLET ORAL DAILY
Status: DISCONTINUED | OUTPATIENT
Start: 2021-07-21 | End: 2021-08-03 | Stop reason: HOSPADM

## 2021-07-21 RX ORDER — 0.9 % SODIUM CHLORIDE 0.9 %
30 INTRAVENOUS SOLUTION INTRAVENOUS ONCE
Status: DISCONTINUED | OUTPATIENT
Start: 2021-07-21 | End: 2021-07-21

## 2021-07-21 RX ORDER — LANOLIN ALCOHOL/MO/W.PET/CERES
325 CREAM (GRAM) TOPICAL 2 TIMES DAILY
Status: DISCONTINUED | OUTPATIENT
Start: 2021-07-21 | End: 2021-08-03 | Stop reason: HOSPADM

## 2021-07-21 RX ORDER — FLUOXETINE 10 MG/1
10 CAPSULE ORAL DAILY
Status: DISCONTINUED | OUTPATIENT
Start: 2021-07-21 | End: 2021-07-24

## 2021-07-21 RX ORDER — BUDESONIDE AND FORMOTEROL FUMARATE DIHYDRATE 160; 4.5 UG/1; UG/1
2 AEROSOL RESPIRATORY (INHALATION) 2 TIMES DAILY
Status: DISCONTINUED | OUTPATIENT
Start: 2021-07-21 | End: 2021-08-03 | Stop reason: HOSPADM

## 2021-07-21 RX ORDER — VANCOMYCIN HYDROCHLORIDE 1 G/200ML
15 INJECTION, SOLUTION INTRAVENOUS EVERY 12 HOURS
Status: DISCONTINUED | OUTPATIENT
Start: 2021-07-21 | End: 2021-07-21

## 2021-07-21 RX ORDER — AMLODIPINE BESYLATE 5 MG/1
5 TABLET ORAL DAILY
Status: DISCONTINUED | OUTPATIENT
Start: 2021-07-21 | End: 2021-08-03 | Stop reason: HOSPADM

## 2021-07-21 RX ORDER — POTASSIUM CHLORIDE 20 MEQ/1
20 TABLET, EXTENDED RELEASE ORAL
Status: DISCONTINUED | OUTPATIENT
Start: 2021-07-22 | End: 2021-08-03 | Stop reason: HOSPADM

## 2021-07-21 RX ORDER — SODIUM CHLORIDE 0.9 % (FLUSH) 0.9 %
5-40 SYRINGE (ML) INJECTION PRN
Status: DISCONTINUED | OUTPATIENT
Start: 2021-07-21 | End: 2021-08-03 | Stop reason: HOSPADM

## 2021-07-21 RX ORDER — VANCOMYCIN HYDROCHLORIDE 1 G/200ML
1000 INJECTION, SOLUTION INTRAVENOUS EVERY 12 HOURS
Status: DISCONTINUED | OUTPATIENT
Start: 2021-07-22 | End: 2021-07-23

## 2021-07-21 RX ORDER — ROPINIROLE 1 MG/1
1 TABLET, FILM COATED ORAL NIGHTLY
Status: DISCONTINUED | OUTPATIENT
Start: 2021-07-21 | End: 2021-08-03 | Stop reason: HOSPADM

## 2021-07-21 RX ORDER — ORPHENADRINE CITRATE 30 MG/ML
60 INJECTION INTRAMUSCULAR; INTRAVENOUS ONCE
Status: COMPLETED | OUTPATIENT
Start: 2021-07-21 | End: 2021-07-21

## 2021-07-21 RX ORDER — LEVALBUTEROL INHALATION SOLUTION 1.25 MG/3ML
1.25 SOLUTION RESPIRATORY (INHALATION) EVERY 8 HOURS PRN
Status: DISCONTINUED | OUTPATIENT
Start: 2021-07-21 | End: 2021-07-21

## 2021-07-21 RX ORDER — BUSPIRONE HYDROCHLORIDE 15 MG/1
15 TABLET ORAL 3 TIMES DAILY
Status: DISCONTINUED | OUTPATIENT
Start: 2021-07-21 | End: 2021-08-02

## 2021-07-21 RX ORDER — SODIUM CHLORIDE 9 MG/ML
INJECTION, SOLUTION INTRAVENOUS CONTINUOUS
Status: DISCONTINUED | OUTPATIENT
Start: 2021-07-21 | End: 2021-07-23

## 2021-07-21 RX ORDER — PREGABALIN 100 MG/1
200 CAPSULE ORAL 3 TIMES DAILY
Status: DISCONTINUED | OUTPATIENT
Start: 2021-07-21 | End: 2021-08-03 | Stop reason: HOSPADM

## 2021-07-21 RX ORDER — BACLOFEN 10 MG/1
10 TABLET ORAL 3 TIMES DAILY
Status: DISCONTINUED | OUTPATIENT
Start: 2021-07-21 | End: 2021-08-03 | Stop reason: HOSPADM

## 2021-07-21 RX ORDER — OXYCODONE HYDROCHLORIDE AND ACETAMINOPHEN 5; 325 MG/1; MG/1
1 TABLET ORAL EVERY 4 HOURS PRN
Status: DISCONTINUED | OUTPATIENT
Start: 2021-07-21 | End: 2021-08-03 | Stop reason: HOSPADM

## 2021-07-21 RX ORDER — SODIUM CHLORIDE 0.9 % (FLUSH) 0.9 %
5-40 SYRINGE (ML) INJECTION EVERY 12 HOURS SCHEDULED
Status: DISCONTINUED | OUTPATIENT
Start: 2021-07-21 | End: 2021-08-03 | Stop reason: HOSPADM

## 2021-07-21 RX ORDER — OXYCODONE HYDROCHLORIDE AND ACETAMINOPHEN 5; 325 MG/1; MG/1
2 TABLET ORAL EVERY 4 HOURS PRN
Status: DISCONTINUED | OUTPATIENT
Start: 2021-07-21 | End: 2021-07-23

## 2021-07-21 RX ORDER — POLYETHYLENE GLYCOL 3350 17 G/17G
17 POWDER, FOR SOLUTION ORAL DAILY
Status: DISCONTINUED | OUTPATIENT
Start: 2021-07-21 | End: 2021-07-22

## 2021-07-21 RX ORDER — CARBAMAZEPINE 100 MG/1
200 TABLET, EXTENDED RELEASE ORAL 2 TIMES DAILY
Status: DISCONTINUED | OUTPATIENT
Start: 2021-07-21 | End: 2021-07-25

## 2021-07-21 RX ORDER — ACETAMINOPHEN 650 MG/1
650 SUPPOSITORY RECTAL EVERY 6 HOURS PRN
Status: DISCONTINUED | OUTPATIENT
Start: 2021-07-21 | End: 2021-08-03 | Stop reason: HOSPADM

## 2021-07-21 RX ORDER — LEVALBUTEROL INHALATION SOLUTION 1.25 MG/3ML
1.25 SOLUTION RESPIRATORY (INHALATION) ONCE
Status: DISCONTINUED | OUTPATIENT
Start: 2021-07-21 | End: 2021-07-26

## 2021-07-21 RX ORDER — ONDANSETRON 2 MG/ML
4 INJECTION INTRAMUSCULAR; INTRAVENOUS ONCE
Status: COMPLETED | OUTPATIENT
Start: 2021-07-21 | End: 2021-07-21

## 2021-07-21 RX ORDER — LANOLIN ALCOHOL/MO/W.PET/CERES
3 CREAM (GRAM) TOPICAL NIGHTLY PRN
Status: DISCONTINUED | OUTPATIENT
Start: 2021-07-21 | End: 2021-08-03 | Stop reason: HOSPADM

## 2021-07-21 RX ORDER — ACETAMINOPHEN 325 MG/1
650 TABLET ORAL EVERY 6 HOURS PRN
Status: DISCONTINUED | OUTPATIENT
Start: 2021-07-21 | End: 2021-08-03 | Stop reason: HOSPADM

## 2021-07-21 RX ORDER — TRAZODONE HYDROCHLORIDE 50 MG/1
50 TABLET ORAL NIGHTLY PRN
Status: DISCONTINUED | OUTPATIENT
Start: 2021-07-21 | End: 2021-08-03 | Stop reason: HOSPADM

## 2021-07-21 RX ORDER — HYDROXYZINE HYDROCHLORIDE 25 MG/1
25 TABLET, FILM COATED ORAL EVERY 8 HOURS
Status: DISCONTINUED | OUTPATIENT
Start: 2021-07-21 | End: 2021-07-23

## 2021-07-21 RX ORDER — FENTANYL CITRATE 50 UG/ML
25 INJECTION, SOLUTION INTRAMUSCULAR; INTRAVENOUS ONCE
Status: COMPLETED | OUTPATIENT
Start: 2021-07-21 | End: 2021-07-21

## 2021-07-21 RX ORDER — 0.9 % SODIUM CHLORIDE 0.9 %
30 INTRAVENOUS SOLUTION INTRAVENOUS ONCE
Status: COMPLETED | OUTPATIENT
Start: 2021-07-21 | End: 2021-07-21

## 2021-07-21 RX ORDER — SODIUM CHLORIDE 9 MG/ML
25 INJECTION, SOLUTION INTRAVENOUS PRN
Status: DISCONTINUED | OUTPATIENT
Start: 2021-07-21 | End: 2021-08-03 | Stop reason: HOSPADM

## 2021-07-21 RX ADMIN — TRAZODONE HYDROCHLORIDE 50 MG: 50 TABLET ORAL at 23:11

## 2021-07-21 RX ADMIN — FENTANYL CITRATE 25 MCG: 50 INJECTION, SOLUTION INTRAMUSCULAR; INTRAVENOUS at 16:18

## 2021-07-21 RX ADMIN — PREGABALIN 200 MG: 100 CAPSULE ORAL at 23:08

## 2021-07-21 RX ADMIN — SODIUM CHLORIDE 1905 ML: 9 INJECTION, SOLUTION INTRAVENOUS at 16:17

## 2021-07-21 RX ADMIN — VANCOMYCIN HYDROCHLORIDE 1000 MG: 1 INJECTION, SOLUTION INTRAVENOUS at 19:56

## 2021-07-21 RX ADMIN — AMLODIPINE BESYLATE 5 MG: 5 TABLET ORAL at 23:08

## 2021-07-21 RX ADMIN — ORPHENADRINE CITRATE 60 MG: 30 INJECTION INTRAMUSCULAR; INTRAVENOUS at 17:21

## 2021-07-21 RX ADMIN — SODIUM CHLORIDE 1909 ML: 9 INJECTION, SOLUTION INTRAVENOUS at 16:17

## 2021-07-21 RX ADMIN — ROPINIROLE HYDROCHLORIDE 1 MG: 1 TABLET, FILM COATED ORAL at 23:09

## 2021-07-21 RX ADMIN — BACLOFEN 10 MG: 10 TABLET ORAL at 22:50

## 2021-07-21 RX ADMIN — IOPAMIDOL 75 ML: 755 INJECTION, SOLUTION INTRAVENOUS at 17:11

## 2021-07-21 RX ADMIN — ENOXAPARIN SODIUM 40 MG: 40 INJECTION SUBCUTANEOUS at 22:50

## 2021-07-21 RX ADMIN — POLYETHYLENE GLYCOL 3350 17 G: 17 POWDER, FOR SOLUTION ORAL at 23:38

## 2021-07-21 RX ADMIN — OXYCODONE HYDROCHLORIDE AND ACETAMINOPHEN 2 TABLET: 5; 325 TABLET ORAL at 22:49

## 2021-07-21 RX ADMIN — SODIUM CHLORIDE: 9 INJECTION, SOLUTION INTRAVENOUS at 23:12

## 2021-07-21 RX ADMIN — FERROUS SULFATE TAB EC 325 MG (65 MG FE EQUIVALENT) 325 MG: 325 (65 FE) TABLET DELAYED RESPONSE at 23:10

## 2021-07-21 RX ADMIN — FLUOXETINE 10 MG: 10 CAPSULE ORAL at 23:09

## 2021-07-21 RX ADMIN — HYDROXYZINE HYDROCHLORIDE 25 MG: 25 TABLET ORAL at 23:09

## 2021-07-21 RX ADMIN — BUSPIRONE HYDROCHLORIDE 15 MG: 15 TABLET ORAL at 23:08

## 2021-07-21 RX ADMIN — CARBAMAZEPINE 200 MG: 100 TABLET, EXTENDED RELEASE ORAL at 23:09

## 2021-07-21 RX ADMIN — ONDANSETRON 4 MG: 2 INJECTION INTRAMUSCULAR; INTRAVENOUS at 16:18

## 2021-07-21 RX ADMIN — FOLIC ACID 1 MG: 1 TABLET ORAL at 23:10

## 2021-07-21 RX ADMIN — PIPERACILLIN AND TAZOBACTAM 3375 MG: 3; .375 INJECTION, POWDER, FOR SOLUTION INTRAVENOUS at 19:17

## 2021-07-21 ASSESSMENT — ENCOUNTER SYMPTOMS
ABDOMINAL PAIN: 0
STRIDOR: 0
DIARRHEA: 0
VOMITING: 0
EYE DISCHARGE: 0
BACK PAIN: 1
PHOTOPHOBIA: 0
COUGH: 0
TROUBLE SWALLOWING: 0
ABDOMINAL DISTENTION: 0
SHORTNESS OF BREATH: 1
CHEST TIGHTNESS: 0
APNEA: 0
EYE PAIN: 0
CONSTIPATION: 1
COLOR CHANGE: 1
SORE THROAT: 0
WHEEZING: 0
EYE REDNESS: 0
COUGH: 1
ABDOMINAL PAIN: 1
NAUSEA: 0

## 2021-07-21 ASSESSMENT — PAIN SCALES - GENERAL
PAINLEVEL_OUTOF10: 7
PAINLEVEL_OUTOF10: 10
PAINLEVEL_OUTOF10: 10
PAINLEVEL_OUTOF10: 8
PAINLEVEL_OUTOF10: 10

## 2021-07-21 ASSESSMENT — PAIN DESCRIPTION - FREQUENCY: FREQUENCY: CONTINUOUS

## 2021-07-21 ASSESSMENT — PAIN DESCRIPTION - LOCATION
LOCATION: BACK
LOCATION: BACK

## 2021-07-21 ASSESSMENT — PAIN DESCRIPTION - PAIN TYPE: TYPE: CHRONIC PAIN

## 2021-07-21 ASSESSMENT — PAIN DESCRIPTION - ONSET: ONSET: ON-GOING

## 2021-07-21 ASSESSMENT — PAIN DESCRIPTION - DESCRIPTORS: DESCRIPTORS: ACHING;DISCOMFORT;DULL;PRESSURE

## 2021-07-21 ASSESSMENT — PAIN DESCRIPTION - ORIENTATION: ORIENTATION: LOWER

## 2021-07-21 NOTE — ED PROVIDER NOTES
[facility] [address]  Emergency Department  Emergency Medicine Resident Sign-out     Care of Ian Merlin was assumed from Dr. Dorene Sorenson and is being seen for Back Pain (states chronic back pain becoming worse.  has recently started physical therapy)    . The patient's initial evaluation and plan have been discussed with the prior provider who initially evaluated the patient.      EMERGENCY DEPARTMENT COURSE / MEDICAL DECISION MAKING:       MEDICATIONS GIVEN:  Orders Placed This Encounter   Medications    DISCONTD: levalbuterol (Rahul Pluck) nebulizer solution 1.25 mg    ondansetron (ZOFRAN) injection 4 mg    fentaNYL (SUBLIMAZE) injection 25 mcg    DISCONTD: 0.9 % sodium chloride bolus    DISCONTD: cefTRIAXone (ROCEPHIN) 1000 mg IVPB in 50 mL D5W minibag     Order Specific Question:   Antimicrobial Indications     Answer:   Sepsis of Unknown Etiology    0.9 % sodium chloride bolus    orphenadrine (NORFLEX) injection 60 mg    levalbuterol (XOPENEX) nebulizer solution 1.25 mg    iopamidol (ISOVUE-370) 76 % injection 75 mL    vancomycin (VANCOCIN) 1000 mg in dextrose 5% 200 mL IVPB     Order Specific Question:   Antimicrobial Indications     Answer:   Sepsis of Unknown Etiology    piperacillin-tazobactam (ZOSYN) 3,375 mg in dextrose 5 % 50 mL IVPB (mini-bag)     Order Specific Question:   Antimicrobial Indications     Answer:   Sepsis of Unknown Etiology    amLODIPine (NORVASC) tablet 5 mg    baclofen (LIORESAL) tablet 10 mg    budesonide-formoterol (SYMBICORT) 160-4.5 MCG/ACT inhaler 2 puff    busPIRone (BUSPAR) tablet 15 mg    carBAMazepine (TEGRETOL XR) extended release tablet 200 mg    ferrous sulfate (FE TABS 325) EC tablet 325 mg    FLUoxetine (PROZAC) capsule 10 mg    folic acid (FOLVITE) tablet 1 mg    hydrOXYzine (ATARAX) tablet 25 mg    potassium chloride (KLOR-CON M) extended release tablet 20 mEq    pregabalin (LYRICA) capsule 200 mg    rOPINIRole (REQUIP) tablet 1 mg    traZODone (DESYREL) tablet 50 mg    0.9 % sodium chloride infusion    sodium chloride flush 0.9 % injection 5-40 mL    sodium chloride flush 0.9 % injection 5-40 mL    0.9 % sodium chloride infusion    enoxaparin (LOVENOX) injection 40 mg    OR Linked Order Group     acetaminophen (TYLENOL) tablet 650 mg     acetaminophen (TYLENOL) suppository 650 mg    piperacillin-tazobactam (ZOSYN) 3,375 mg in dextrose 5 % 50 mL IVPB extended infusion (mini-bag)     Order Specific Question:   Antimicrobial Indications     Answer: Other     Order Specific Question:   Other Abx Indication     Answer: Suspected Sepsis of Skin or Soft Tissue Origin    DISCONTD: vancomycin (VANCOCIN) 1000 mg in dextrose 5% 200 mL IVPB     Order Specific Question:   Antimicrobial Indications     Answer: Other     Order Specific Question:   Other Abx Indication     Answer:    Suspected Sepsis of Skin or Soft Tissue Origin    vancomycin (VANCOCIN) intermittent dosing (placeholder)     Order Specific Question:   Antimicrobial Indications     Answer:   Sepsis of Unknown Etiology    vancomycin (VANCOCIN) 1000 mg in dextrose 5% 200 mL IVPB     Order Specific Question:   Antimicrobial Indications     Answer:   Sepsis of Unknown Etiology    OR Linked Order Group     oxyCODONE-acetaminophen (PERCOCET) 5-325 MG per tablet 1 tablet     oxyCODONE-acetaminophen (PERCOCET) 5-325 MG per tablet 2 tablet    melatonin tablet 3 mg    polyethylene glycol (GLYCOLAX) packet 17 g    fentaNYL (SUBLIMAZE) injection 50 mcg    metoclopramide (REGLAN) injection 5 mg       LABS / RADIOLOGY:     Labs Reviewed   CBC WITH AUTO DIFFERENTIAL - Abnormal; Notable for the following components:       Result Value    WBC 21.5 (*)     Platelets 556 (*)     Seg Neutrophils 89 (*)     Lymphocytes 5 (*)     Eosinophils % 0 (*)     Segs Absolute 19.12 (*)     Absolute Mono # 0.86 (*)     Basophils Absolute 0.22 (*)     All other components within normal limits   COMPREHENSIVE METABOLIC PANEL - Abnormal; Notable for the following components:    Glucose 128 (*)     CREATININE 0.41 (*)     Sodium 134 (*)     Chloride 94 (*)     Alkaline Phosphatase 283 (*)     Total Protein 8.5 (*)     Albumin/Globulin Ratio 0.9 (*)     All other components within normal limits   HGB/HCT - Abnormal; Notable for the following components:    POC Hemoglobin 17.0 (*)     POC Hematocrit 50 (*)     All other components within normal limits   CBC - Abnormal; Notable for the following components:    WBC 22.0 (*)     All other components within normal limits   COMPREHENSIVE METABOLIC PANEL W/ REFLEX TO MG FOR LOW K - Abnormal; Notable for the following components:    Glucose 104 (*)     CREATININE 0.29 (*)     Alkaline Phosphatase 226 (*)     Albumin 3.0 (*)     Albumin/Globulin Ratio 0.8 (*)     All other components within normal limits   VENOUS BLOOD GAS, POINT OF CARE - Abnormal; Notable for the following components:    pO2, Calvin 28.7 (*)     O2 Sat, Calvin 55 (*)     All other components within normal limits   POCT GLUCOSE - Abnormal; Notable for the following components:    POC Glucose 141 (*)     All other components within normal limits   CULTURE, BLOOD 1   CULTURE, BLOOD 1   CULTURE, BLOOD 1   CULTURE, BLOOD 2   CULTURE, URINE   LACTATE, SEPSIS   HCG, SERUM, QUALITATIVE   ELECTROLYTES PLUS   CALCIUM, IONIC (POC)   LACTIC ACID, WHOLE BLOOD   LACTATE, SEPSIS   LACTATE, SEPSIS   MAGNESIUM   PROTIME-INR   CALCIUM, IONIZED   URINALYSIS WITH MICROSCOPIC   PHOSPHORUS   AMYLASE   LIPASE   CREATININE W/GFR POINT OF CARE   UREA N (POC)   LACTIC ACID,POINT OF CARE   POC BLOOD GAS AND CHEMISTRY       CT LUMBAR SPINE TRAUMA RECONSTRUCTION    Result Date: 7/21/2021  EXAMINATION: CT OF THE THORACIC SPINE WITHOUT CONTRAST; CT OF THE LUMBAR SPINE WITHOUT CONTRAST  7/21/2021 TECHNIQUE: CT of the thoracic spine was performed without the administration of intravenous contrast. Multiplanar reformatted images are provided for review.  Dose modulation, iterative reconstruction, and/or weight based adjustment of the mA/kV was utilized to reduce the radiation dose to as low as reasonably achievable.; CT of the lumbar spine was performed without the administration of intravenous contrast. Multiplanar reformatted images are provided for review. Dose modulation, iterative reconstruction, and/or weight based adjustment of the mA/kV was utilized to reduce the radiation dose to as low as reasonably achievable. COMPARISON: CT chest of 21 May 2021; CT abdomen 12 February 2021. HISTORY: ORDERING SYSTEM PROVIDED HISTORY: sepsis, back pain TECHNOLOGIST PROVIDED HISTORY: sepsis, back pain Is the patient pregnant?->No Reason for Exam: back pain, sepsis Acuity: Unknown Type of Exam: Unknown; ORDERING SYSTEM PROVIDED HISTORY: BACK PAIN TECHNOLOGIST PROVIDED HISTORY: sepsis, back pain Is the patient pregnant?->No Reason for Exam: back pain, sepsis Acuity: Unknown Type of Exam: Unknown FINDINGS: CT thoracic spine without: BONES/ALIGNMENT: Osteopenia complicates assessment. Normal spine alignment is noted. There has been worsening loss in height in the T6 vertebral body which demonstrated a compression fracture which with subacute on prior examination. Minimal bulging of the dorsal aspect of the vertebral body is noted. A Schmorl's node superior aspect of T8 is noted with minimal superior loss in height, nonacute. Schmorl's node is present superior aspect T9. There is no change in a chronic superior endplate compression V58. DEGENERATIVE CHANGES: Diffuse mild degenerative and degenerative disc changes are noted. No gross bony canal stenosis. Narrowing of the bilateral T6-T7 neural foramina is noted. Other neural foramina are patent. SOFT TISSUES: Mild soft tissue swelling ventral to the T6 vertebral body is noted. No bony destructive process or abscess formation is noted. Included lung bases are unremarkable.  CT lumbar spine without: BONES/ALIGNMENT: Normal spine alignment is noted. Superior endplate compression L5 is noted, new since the February study. The degree of depression is 20-25%. Findings suggest subacute etiology with portions of fracture line visible but with increased sclerosis indicative of healing. No significant retropulsion of the dorsal aspect of the vertebra toward the spinal canal is noted. DEGENERATIVE CHANGES: Diffuse mild degenerative changes. Multilevel mild disc protrusions are noted. Bony neural foramina are patent. No exiting nerve root impingement is noted. SOFT TISSUES/RETROPERITONEUM: No paraspinal mass is demonstrated. Scattered calcified plaque in the abdominal aorta is noted. Portions of a calcified retroperitoneal preaortic mass are visualized on some of the images. This is likely thin pancreas consistent with pancreatitis. However, areas incompletely included. CT thoracic spine: 1. Decrease in height in T6 compared to prior study compatible with worsening compression with subacute etiology and mild protrusion of the dorsal aspect of T6. Narrowed T6-T7 neural foramina. 2.  Chronic superior height T8, T9, and T11 without interval change from prior study. 3.  Mild prevertebral soft tissue prominence T6 without evidence of abscess formation. CT lumbar spine: 1. No traumatic malalignment. 2.  Compression fracture superior L5 with subacute appearance. 3.  Calcifications in the abdomen incompletely included appearance suggesting pancreatic calcifications. RECOMMENDATIONS: Please reference CT chest, abdomen and pelvis of same day. CT THORACIC SPINE TRAUMA RECONSTRUCTION    Result Date: 7/21/2021  EXAMINATION: CT OF THE THORACIC SPINE WITHOUT CONTRAST; CT OF THE LUMBAR SPINE WITHOUT CONTRAST  7/21/2021 TECHNIQUE: CT of the thoracic spine was performed without the administration of intravenous contrast. Multiplanar reformatted images are provided for review.  Dose modulation, iterative reconstruction, and/or weight based adjustment of the mA/kV was utilized to reduce the radiation dose to as low as reasonably achievable.; CT of the lumbar spine was performed without the administration of intravenous contrast. Multiplanar reformatted images are provided for review. Dose modulation, iterative reconstruction, and/or weight based adjustment of the mA/kV was utilized to reduce the radiation dose to as low as reasonably achievable. COMPARISON: CT chest of 21 May 2021; CT abdomen 12 February 2021. HISTORY: ORDERING SYSTEM PROVIDED HISTORY: sepsis, back pain TECHNOLOGIST PROVIDED HISTORY: sepsis, back pain Is the patient pregnant?->No Reason for Exam: back pain, sepsis Acuity: Unknown Type of Exam: Unknown; ORDERING SYSTEM PROVIDED HISTORY: BACK PAIN TECHNOLOGIST PROVIDED HISTORY: sepsis, back pain Is the patient pregnant?->No Reason for Exam: back pain, sepsis Acuity: Unknown Type of Exam: Unknown FINDINGS: CT thoracic spine without: BONES/ALIGNMENT: Osteopenia complicates assessment. Normal spine alignment is noted. There has been worsening loss in height in the T6 vertebral body which demonstrated a compression fracture which with subacute on prior examination. Minimal bulging of the dorsal aspect of the vertebral body is noted. A Schmorl's node superior aspect of T8 is noted with minimal superior loss in height, nonacute. Schmorl's node is present superior aspect T9. There is no change in a chronic superior endplate compression G23. DEGENERATIVE CHANGES: Diffuse mild degenerative and degenerative disc changes are noted. No gross bony canal stenosis. Narrowing of the bilateral T6-T7 neural foramina is noted. Other neural foramina are patent. SOFT TISSUES: Mild soft tissue swelling ventral to the T6 vertebral body is noted. No bony destructive process or abscess formation is noted. Included lung bases are unremarkable. CT lumbar spine without: BONES/ALIGNMENT: Normal spine alignment is noted.   Superior endplate compression L5 is noted, new since the February study. The degree of depression is 20-25%. Findings suggest subacute etiology with portions of fracture line visible but with increased sclerosis indicative of healing. No significant retropulsion of the dorsal aspect of the vertebra toward the spinal canal is noted. DEGENERATIVE CHANGES: Diffuse mild degenerative changes. Multilevel mild disc protrusions are noted. Bony neural foramina are patent. No exiting nerve root impingement is noted. SOFT TISSUES/RETROPERITONEUM: No paraspinal mass is demonstrated. Scattered calcified plaque in the abdominal aorta is noted. Portions of a calcified retroperitoneal preaortic mass are visualized on some of the images. This is likely thin pancreas consistent with pancreatitis. However, areas incompletely included. CT thoracic spine: 1. Decrease in height in T6 compared to prior study compatible with worsening compression with subacute etiology and mild protrusion of the dorsal aspect of T6. Narrowed T6-T7 neural foramina. 2.  Chronic superior height T8, T9, and T11 without interval change from prior study. 3.  Mild prevertebral soft tissue prominence T6 without evidence of abscess formation. CT lumbar spine: 1. No traumatic malalignment. 2.  Compression fracture superior L5 with subacute appearance. 3.  Calcifications in the abdomen incompletely included appearance suggesting pancreatic calcifications. RECOMMENDATIONS: Please reference CT chest, abdomen and pelvis of same day. RECENT VITALS:     Temp: 98.4 °F (36.9 °C),  Pulse: 99, Resp: 19, BP: (!) 119/91, SpO2: 94 %    This patient is a 45 y.o. Female with sepsis likely urinary source. Treated with vancomycin and Zosyn, fluids. Patient was having back pain for 2 days. Strong history of UTIs with \"abnormal symptoms \". CT abdomen and pelvis ordered to look for intra-abdominal source of infection, CT lumbar spine thoracic spine eval for infection, trauma.   Awaiting results of CT abdomen pelvis. Admit to medicine. OUTSTANDING TASKS / RECOMMENDATIONS:    1. F/u scan  2. F/u urine  3. admit     FINAL IMPRESSION:     1. Septicemia ([facility] [address]        DISPOSITION:         DISPOSITION:  []  Discharge   []  Transfer -    [x]  Admission -  Intermed   []  Against Medical Advice   []  Eloped   FOLLOW-UP: No follow-up provider specified.    DISCHARGE MEDICATIONS: Current Discharge Medication List              Kevin Gupta DO  Emergency Medicine Resident  [facility]       Kevin Gupta [place]  Resident  07/22/21 [id number]

## 2021-07-21 NOTE — ED PROVIDER NOTES
[facility] ED  Emergency Department Encounter  EmergencyMedicine Resident     Pt Name:Barbara Ruiz  MRN: [id number]  [place] 1970  Date of evaluation: 7/21/21  PCP:  Jez Garcia MD    This patient was evaluated in the Emergency Department for symptoms described in the history of present illness. The patient was evaluated in the context of the global COVID-19 pandemic, which necessitated consideration that the patient might be at risk for infection with the SARS-CoV-2 virus that causes COVID-19. Institutional protocols and algorithms that pertain to the evaluation of patients at risk for COVID-19 are in a state of rapid change based on information released by regulatory bodies including the CDC and federal and state organizations. These policies and algorithms were followed during the patient's care in the ED. CHIEF COMPLAINT       Chief Complaint   Patient presents with    Back Pain     states chronic back pain becoming worse.  has recently started physical therapy       HISTORY OF PRESENT ILLNESS  (Location/Symptom, Timing/Onset, Context/Setting, Quality, Duration, Modifying Factors, Severity.)      Kady Dutta is a 45 y.o. female who presents with back pain and skin changes x 2 days. Patient reports severe lower back pain that is progressively worsening. She reports that her hands feel weaker since onset and that she has a constant headache with this. Nothing she has tried has improved her pain. Her friend noticed skin changes on the patient's back when the patient was walking in her home naked, but turned away from her friend so she would not be exposed. The patient does not know how long her back has looked like this. Patient reports history of UTI's that have atypical symptoms, she does not have burning or urgency. She has a long history of urinary incontinence that has not recently changed.  Patient reports typically has diarrhea but recently has been constipated. Last BM today, which did not improve her back pain. No numbness or paresthesias. PAST MEDICAL / SURGICAL / SOCIAL / FAMILY HISTORY      has a past medical history of Acute respiratory failure with hypoxia ([address] Alcohol withdrawal syndrome, with delirium ([address] Alcoholism ([address] Anemia, Astrocytoma ([address] - 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24, the patient underwent 2 surgical resections without known recurrence, Closed fracture of lateral portion of left tibial plateau, COPD (chronic obstructive pulmonary disease) ([address] Depression, Dysphagia, GI bleed, Hypertension, Memory loss, Oxygen dependent, Pain, joint, ankle and foot, Pancreatic lesion, Peripheral neuropathy, Seizures ([address] Tension headache, Under care of team, Under care of team, Under care of team, Under care of team, Under care of team, and Wellness examination. has a past surgical history that includes Hysterectomy (2003); Brain tumor excision (1989); fracture surgery (Left, 7-3-13); fracture surgery (Right); Upper gastrointestinal endoscopy (N/A, 10/22/2020); Colonoscopy (N/A, 10/22/2020); Endoscopic ultrasonography, GI (N/A, 12/9/2020); Upper gastrointestinal endoscopy (N/A, 4/5/2021); and Hand surgery.     Social History     Socioeconomic History    Marital status: Single     Spouse name: Not on file    Number of children: Not on file    Years of education: Not on file    Highest education level: Not on file   Occupational History    Not on file   Tobacco Use    Smoking status: Current Every Day Smoker     Packs/day: 0.50     Years: 30.00     Pack years: 15.00     Types: Cigarettes    Smokeless tobacco: Never Used    Tobacco comment: plans on quitting in the future    Vaping Use    Vaping Use: Never used   Substance and Sexual Activity    Alcohol use: Not Currently     Alcohol/week: 0.0 standard drinks    Drug use: Yes     Frequency: 2.0 times per week     Types: Marijuana    Sexual activity: Not on file   Other Topics Concern    Not on file   Social History Narrative    Not on file     Social Determinants of Health     Financial Resource Strain: Medium Risk    Difficulty of Paying Living Expenses: Somewhat hard   Food Insecurity: No Food Insecurity    Worried About Running Out of Food in the Last Year: Never true    [place] of Food in the Last Year: Never true   Transportation Needs:     Lack of Transportation (Medical):  Lack of Transportation (Non-Medical):    Physical Activity:     Days of Exercise per Week:     Minutes of Exercise per Session:    Stress:     Feeling of Stress :    Social Connections:     Frequency of Communication with Friends and Family:     Frequency of Social Gatherings with Friends and Family:     Attends [employer] Services:     Active Member of Clubs or Organizations:     Attends Club or Organization Meetings:     Marital Status:    Intimate Partner Violence:     Fear of Current or Ex-Partner:     Emotionally Abused:     Physically Abused:     Sexually Abused:        Family History   Problem Relation Age of Onset    Other Father     Cancer Maternal Grandmother     Heart Disease Paternal Grandmother     Esophageal Cancer Maternal Aunt        Allergies:  Patient has no known allergies. Home Medications:  Prior to Admission medications    Medication Sig Start Date End Date Taking? Authorizing Provider   acamprosate (CAMPRAL) 333 MG tablet Take 2 tablets by mouth 3 times daily 7/16/21 8/15/21   Rash, APRN - NP   sertraline (ZOLOFT) 50 MG tablet Take 3 tablets by mouth daily for 7 days, THEN 2 tablets daily for 7 days, THEN 1 tablet daily for 7 days. 7/16/21 8/6/21   Rash, APRN - NP   FLUoxetine (PROZAC) 20 MG tablet Take 0.5 tablets by mouth daily for 7 days, THEN 1 tablet daily for 23 days.  7/16/21 8/15/21   Rash, APRN - NP   traZODone (DESYREL) 50 MG tablet TAKE 1 AND 1/2 TABLET BY MOUTH ONCE NIGHTLY AS NEEDED FOR SLEEP 7/16/21    Rash, APRN - NP thermotabs (MEDI-LYTE) TABS tablet Take 1 tablet by mouth daily for 3 days 7/9/21 7/12/21  LOI Mchugh NP   carBAMazepine (TEGRETOL XR) 200 MG extended release tablet Take 1 tablet by mouth 2 times daily 7/6/21   Hosea Krabbe, MD   topiramate (TOPAMAX) 25 MG tablet Take 2 tablets by mouth daily 7/6/21   Hosea Krabbe, MD   pregabalin (LYRICA) 200 MG capsule Take 1 capsule by mouth 3 times daily for 90 days.  7/2/21 9/30/21  Hosea Krabbe, MD   KLOR-CON M20 20 MEQ extended release tablet TAKE ONE TABLET BY MOUTH DAILY 6/24/21   Ludivina Hong MD   amLODIPine (NORVASC) 5 MG tablet TAKE ONE TABLET BY MOUTH DAILY 6/3/21   Ludivina Hong MD   busPIRone (BUSPAR) 15 MG tablet Take 1 tablet by mouth 3 times daily 5/5/21   Ludivina Hong MD   baclofen (LIORESAL) 10 MG tablet Take 1 tablet by mouth 3 times daily 5/25/21   Ludivina Hong MD   ibuprofen (ADVIL;MOTRIN) 600 MG tablet Take 1 tablet by mouth 3 times daily as needed for Pain 5/25/21   Ludivina Hong MD   sertraline (ZOLOFT) 100 MG tablet Take 2 tablets by mouth daily 5/17/21   LOI Varghese NP   ferrous sulfate (IRON 325) 325 (65 Fe) MG tablet Take 1 tablet by mouth 2 times daily 4/22/21   Ludivina Hong MD   rOPINIRole (REQUIP) 1 MG tablet Take 1 tablet by mouth nightly 4/1/21   Ludivina Hong MD   budesonide-formoterol [facility]) 160-4.5 MCG/ACT AERO Inhale 2 puffs into the lungs 2 times daily 3/31/21   Ludivina Hong MD   hydrOXYzine (ATARAX) 50 MG tablet TAKE ONE TABLET BY MOUTH THREE TIMES A DAY 3/12/21   Ludivina Hong MD   tiotropium (SPIRIVA RESPIMAT) 2.5 MCG/ACT AERS inhaler Inhale 2 puffs into the lungs daily 2/26/21 7/8/21  Julia Martinez MD   sodium chloride (ALTAMIST SPRAY) 0.65 % nasal spray 1 spray by Nasal route as needed for Congestion 12/28/20   Ludivina Hong MD   ACETAMINOPHEN EXTRA STRENGTH 500 MG tablet Take 500 mg by mouth 3 times daily as needed 5/8/20   Historical Provider, MD albuterol sulfate HFA (VENTOLIN HFA) 108 (90 Base) MCG/ACT inhaler Inhale 2 puffs into the lungs 4 times daily as needed for Wheezing 6/11/20   Ludivina Arnold MD   vitamin B-1 (THIAMINE) 100 MG tablet Take 1 tablet by mouth daily 7/12/19   Ludivina Arnold MD   vitamin D (ERGOCALCIFEROL) 67564 units CAPS capsule Take 1 capsule by mouth once a week 6/19/19   Ludivina Arnold MD   folic acid (FOLVITE) 1 MG tablet Take 1 tablet by mouth daily 6/19/19   Ludivina Arnold MD       REVIEW OF SYSTEMS    (2-9 systems for level 4, 10 or more for level 5)      Review of Systems   Constitutional: Positive for chills, fatigue and fever. HENT: Positive for congestion. Negative for dental problem and hearing loss. Eyes: Positive for visual disturbance. Negative for discharge and redness. Respiratory: Positive for cough and shortness of breath. Negative for apnea. COPD, not on oxygen at home. Cardiovascular: Negative for chest pain and leg swelling. Gastrointestinal: Positive for abdominal pain and constipation. Negative for diarrhea, nausea and vomiting. Genitourinary: Positive for flank pain. Negative for pelvic pain. Musculoskeletal: Positive for back pain and gait problem. Negative for neck pain. Skin: Positive for color change and rash. Neurological: Positive for weakness and headaches. Negative for speech difficulty and numbness. PHYSICAL EXAM   (up to 7 for level 4, 8 or more for level 5)      INITIAL VITALS:   BP (!) 134/106   Pulse 105   Temp 98.1 °F (36.7 °C) (Oral)   Resp 22   Wt 140 lb (63.5 kg)   SpO2 93%   BMI 23.30 kg/m²     Physical Exam  Constitutional:       General: She is in acute distress. Appearance: She is ill-appearing. HENT:      Head: Normocephalic and atraumatic. Eyes:      General: No visual field deficit or scleral icterus. Right eye: No discharge. Left eye: No discharge.       Conjunctiva/sclera: Conjunctivae normal.      Comments: glasses   Cardiovascular:      Rate and Rhythm: Tachycardia present. Pulmonary:      Effort: Tachypnea present. Breath sounds: Rhonchi present. Comments: tachypnea  Abdominal:      General: Abdomen is flat. There is no distension. Palpations: Abdomen is soft. Tenderness: There is no abdominal tenderness. Musculoskeletal:         General: No swelling or deformity. Skin:     General: Skin is warm and dry. Comments: 2cm healing circular burn on right medial thigh, no purulence, no active bleeding   Neurological:      Mental Status: She is oriented to person, place, and time. She is lethargic. GCS: GCS eye subscore is 4. GCS verbal subscore is 5. GCS motor subscore is 6. Cranial Nerves: No facial asymmetry. Sensory: Sensation is intact. No sensory deficit. Motor: No weakness, abnormal muscle tone or seizure activity. Comments: Patient hunched over, head down. Reports subjective weakness in hands. Slowed gait, bent over 2/2 pain. Brudzinski's test negative.          DIFFERENTIAL  DIAGNOSIS     PLAN (LABS / IMAGING / EKG):  Orders Placed This Encounter   Procedures    Culture, Blood 1    Culture, Blood 1    Culture, Urine    CT CHEST ABDOMEN PELVIS W CONTRAST    CT LUMBAR SPINE TRAUMA RECONSTRUCTION    CT THORACIC SPINE TRAUMA RECONSTRUCTION    CBC Auto Differential    Comprehensive Metabolic Panel    Lactate, Sepsis    Urinalysis with Microscopic    HCG Qualitative, Serum    ELECTROLYTES PLUS    Hemoglobin and hematocrit, blood    CALCIUM, IONIC (POC)    Lactic Acid, Whole Blood    Height and weight    Telemetry monitoring - continuous duration    Inpatient consult to Internal Medicine    POC Blood Gas and Chemistry    Venous Blood Gas, POC    Creatinine W/GFR Point of Care    POCT urea (BUN)    Lactic Acid, POC    POCT Glucose       MEDICATIONS ORDERED:  Orders Placed This Encounter   Medications    DISCONTD: levalbuterol (Lesle Horry) nebulizer solution 1.25 mg    ondansetron (ZOFRAN) injection 4 mg    fentaNYL (SUBLIMAZE) injection 25 mcg    DISCONTD: 0.9 % sodium chloride bolus    DISCONTD: cefTRIAXone (ROCEPHIN) 1000 mg IVPB in 50 mL D5W minibag     Order Specific Question:   Antimicrobial Indications     Answer:   Sepsis of Unknown Etiology    0.9 % sodium chloride bolus    orphenadrine (NORFLEX) injection 60 mg    levalbuterol (XOPENEX) nebulizer solution 1.25 mg    iopamidol (ISOVUE-370) 76 % injection 75 mL    vancomycin (VANCOCIN) 1000 mg in dextrose 5% 200 mL IVPB     Order Specific Question:   Antimicrobial Indications     Answer:   Sepsis of Unknown Etiology    piperacillin-tazobactam (ZOSYN) 3,375 mg in dextrose 5 % 50 mL IVPB (mini-bag)     Order Specific Question:   Antimicrobial Indications     Answer:   Sepsis of Unknown Etiology       DDX: meningitis, pyelonephritis, pneumonia, intra-abdominal source of sepsis    DIAGNOSTIC RESULTS / EMERGENCY DEPARTMENT COURSE / MDM   LAB RESULTS:  Results for orders placed or performed during the hospital encounter of 07/21/21   CBC Auto Differential   Result Value Ref Range    WBC 21.5 (H) 3.5 - 11.3 k/uL    RBC 5.08 3.95 - 5.11 m/uL    Hemoglobin 14.9 11.9 - 15.1 g/dL    Hematocrit 44.3 36.3 - 47.1 %    MCV 87.2 82.6 - 102.9 fL    MCH 29.3 25.2 - 33.5 pg    MCHC 33.6 28.4 - 34.8 g/dL    RDW 13.6 11.8 - 14.4 %    Platelets 419 (H) 357 - 453 k/uL    MPV 9.7 8.1 - 13.5 fL    NRBC Automated 0.0 0.0 per 100 WBC    Differential Type NOT REPORTED     WBC Morphology NOT REPORTED     RBC Morphology NOT REPORTED     Platelet Estimate NOT REPORTED     Immature Granulocytes 0 0 %    Seg Neutrophils 89 (H) 36 - 66 %    Lymphocytes 5 (L) 24 - 44 %    Atypical Lymphocytes 1 %    Monocytes 4 1 - 7 %    Eosinophils % 0 (L) 1 - 4 %    Basophils 1 0 - 2 %    Absolute Immature Granulocyte 0.00 0.00 - 0.30 k/uL    Segs Absolute 19.12 (H) 1.8 - 7.7 k/uL    Absolute Lymph # 1.08 1.0 - 4.8 k/uL    Atypical Lymphocytes Absolute 0.22 k/uL    Absolute Mono # 0.86 (H) 0.1 - 0.8 k/uL    Absolute Eos # 0.00 0.0 - 0.4 k/uL    Basophils Absolute 0.22 (H) 0.0 - 0.2 k/uL    Morphology Normal    Comprehensive Metabolic Panel   Result Value Ref Range    Glucose 128 (H) 70 - 99 mg/dL    BUN 15 6 - 20 mg/dL    CREATININE 0.41 (L) 0.50 - 0.90 mg/dL    Bun/Cre Ratio NOT REPORTED 9 - 20    Calcium 10.3 8.6 - 10.4 mg/dL    Sodium 134 (L) 135 - 144 mmol/L    Potassium 4.6 3.7 - 5.3 mmol/L    Chloride 94 (L) 98 - 107 mmol/L    CO2 25 20 - 31 mmol/L    Anion Gap 15 9 - 17 mmol/L    Alkaline Phosphatase 283 (H) 35 - 104 U/L    ALT 12 5 - 33 U/L    AST 24 <32 U/L    Total Bilirubin 0.68 0.3 - 1.2 mg/dL    Total Protein 8.5 (H) 6.4 - 8.3 g/dL    Albumin 4.0 3.5 - 5.2 g/dL    Albumin/Globulin Ratio 0.9 (L) 1.0 - 2.5    GFR Non-African American >60 >60 mL/min    GFR African American >60 >60 mL/min    GFR Comment          GFR Staging NOT REPORTED    Lactate, Sepsis   Result Value Ref Range    Lactic Acid, Sepsis NOT REPORTED 0.5 - 1.9 mmol/L    Lactic Acid, Sepsis, Whole Blood 1.4 0.5 - 1.9 mmol/L   HCG Qualitative, Serum   Result Value Ref Range    hCG Qual NEGATIVE NEGATIVE   ELECTROLYTES PLUS   Result Value Ref Range    POC Sodium 139 138 - 146 mmol/L    POC Potassium 4.5 3.5 - 4.5 mmol/L    POC Chloride 101 98 - 107 mmol/L    POC TCO2 28 22 - 30 mmol/L    Anion Gap 11 7 - 16 mmol/L   Hemoglobin and hematocrit, blood   Result Value Ref Range    POC Hemoglobin 17.0 (H) 12.0 - 16.0 g/dL    POC Hematocrit 50 (H) 36 - 46 %   CALCIUM, IONIC (POC)   Result Value Ref Range    POC Ionized Calcium 1.18 1.15 - 1.33 mmol/L   Lactic Acid, Whole Blood   Result Value Ref Range    Lactic Acid, Whole Blood 1.1 0.7 - 2.1 mmol/L   Venous Blood Gas, POC   Result Value Ref Range    pH, Calvin 7.416 7.320 - 7.430    pCO2, Calvin 42.8 41.0 - 51.0 mm Hg    pO2, Calvin 28.7 (L) 30 - 50 mm Hg    HCO3, Venous 27.5 22.0 - 29.0 mmol/L    Total CO2, Venous NOT REPORTED 23.0 - 30.0 mmol/L    Negative Base Excess, Calvin NOT REPORTED 0.0 - 2.0    Positive Base Excess, Calvin 2 0.0 - 3.0    O2 Sat, Calvin 55 (L) 60.0 - 85.0 %    O2 Device/Flow/% NOT REPORTED     Manav Test NOT REPORTED     Sample Site NOT REPORTED     Mode NOT REPORTED     FIO2 NOT REPORTED     Pt Temp NOT REPORTED     POC pH Temp NOT REPORTED     POC pCO2 Temp NOT REPORTED mm Hg    POC pO2 Temp NOT REPORTED mm Hg   Creatinine W/GFR Point of Care   Result Value Ref Range    POC Creatinine 0.54 0.51 - 1.19 mg/dL    GFR Comment >60 >60 mL/min    GFR Non-African American >60 >60 mL/min    GFR Comment         POCT urea (BUN)   Result Value Ref Range    POC BUN 14 8 - 26 mg/dL   Lactic Acid, POC   Result Value Ref Range    POC Lactic Acid 1.08 0.56 - 1.39 mmol/L   POCT Glucose   Result Value Ref Range    POC Glucose 141 (H) 74 - 100 mg/dL       IMPRESSION: consistent with sepsis    RADIOLOGY:  CTAP pending    Sepsis Times and Checklist   Vital Signs: BP: (!) 134/106  Pulse: 105  Resp: 22  Temp: 98.1 °F (36.7 °C) SpO2: 93 %  SIRS (>2)   Temp > 38.0C or < 36C   HR > 90   RR > 20   WBC > 12 or < 4 or >10% bands  SIRS (>2) and confirmed or suspected source of infection = Sepsis  Is Sepsis due to likely bacterial infection?: Yes     Sepsis Identified:  Date:7/21/21   Time: 1548  Sepsis Orders:  ·  CBC: Yes  ·  CMP: Yes  ·  PT/PTT: No  ·  Blood Cultures x2: Yes  ·  Urinalysis and Urine Culture: Yes  ·  Lactate: Yes  ·  Broad Spectrum Antibiotics Given (within 3 hours of sepsis identification,  after blood cultures):  Started on vancomycin and zosyn as no source was identified by three hours. (If unable to obtain IV access for IV antibiotics within 3 hours of  identification of sepsis, IM antibiotics is acceptable.)  ·             If lactate >2.0 MUST repeat within 6 hours    IV Fluid Bolus:  Is lactate > 4.0:  No  If lactate >  4.0 OR hypotension (MAP<65 mmHg) (with 2 BP readings) 30ml/kg crystalloid MUST be ordered.         EMERGENCY DEPARTMENT COURSE:  Josiane Harris is a 45 y.o. female who presents with back pain and skin changes x 2 days. Patient reports severe lower back pain that is progressively worsening. She reports that her hands feel weaker since onset and that she has a constant headache with this. Patient has low temp, tachycardia and tachypnea, with wbc of 21. Sepsis protocol initiated. No source identified by three hours, patient started on vancomycin and zosyn. Patient's urine grossly purulent and malodorous, likely source of sepsis. Discussed with internal medicine, will admit. FINAL IMPRESSION      1. Septicemia ([facility] [address]          DISPOSITION / PLAN     DISPOSITION  - admit      PATIENT REFERRED TO:  No follow-up provider specified.     DISCHARGE MEDICATIONS:  New Prescriptions    No medications on file       Iam Blount DO  Emergency Medicine Resident    (Please note that portions of thisnote were completed with a voice recognition program.  Efforts were made to edit the dictations but occasionally words are mis-transcribed.)       Monroe Vega DO  Resident  07/21/21 1931

## 2021-07-21 NOTE — ED PROVIDER NOTES
[facility] ED                                Emergency Department                                               Faculty Attestation     I performed a history and physical examination of the patient and discussed management with the resident. I reviewed the residents note and agree with the documented findings and plan of care. Any areas of disagreement are noted on the chart. I was personally present for the key portions of any procedures. I have documented in the chart those procedures where I was not present during the key portions. I have reviewed the emergency nurses triage note. I agree with the chief complaint, past medical history, past surgical history, allergies, medications, social and family history as documented unless otherwise noted below. For Physician Assistant/ Nurse Practitioner cases/documentation I have personally evaluated this patient and have completed at least one if not all key elements of the E/M (history, physical exam, and MDM). Additional findings are as noted. This patient was evaluated in the Emergency Department for symptoms described in the history of present illness. He/she was evaluated in the context of the global COVID-19 pandemic, which necessitated consideration that the patient might be at risk for infection with the SARS-CoV-2 virus that causes COVID-19. Institutional protocols and algorithms that pertain to the evaluation of patients at risk for COVID-19 are in a state of rapid change based on information released by regulatory bodies including the CDC and federal and state organizations. These policies and algorithms were followed during the patient's care in the ED. 77-year-old female presents emergency department with back pain, headache. States that this all started yesterday. She is generalized unwell feeling. Does have a history of prior UTIs and is usually asymptomatic when this happens.   On evaluation she is in acute distress and uncomfortable. Hypertensive, hypothermic, tachycardic, hypoxic. Sitting slumped over in the wheelchair moaning stating that her head and abdomen hurt. Stating that she just feels very uncomfortable. Heart fast but regular rhythm. Lungs clear to auscultation bilaterally. Does have bilateral flank tenderness. Mottling all over her entire back. Abdomen generally tender. No peripheral edema. Will obtain sepsis labs, will wait for renal function. Would like to obtain CT if renal function allows. Likely admission. If no chest or urine source could consider LP although has no meningismus signs.       Critical Care: 21 minutes    Abby Smalls DO  Emergency Medicine Physician        Tracey Sharma [address],   07/21/21 [id number]

## 2021-07-22 ENCOUNTER — APPOINTMENT (OUTPATIENT)
Dept: GENERAL RADIOLOGY | Age: 51
DRG: 720 | End: 2021-07-22
Payer: MEDICARE

## 2021-07-22 PROBLEM — R74.8 ELEVATED ALKALINE PHOSPHATASE LEVEL: Status: ACTIVE | Noted: 2021-07-22

## 2021-07-22 PROBLEM — R79.89 ELEVATED BRAIN NATRIURETIC PEPTIDE (BNP) LEVEL: Status: ACTIVE | Noted: 2021-07-22

## 2021-07-22 PROBLEM — S22.059A CLOSED FRACTURE OF FIFTH THORACIC VERTEBRA (HCC): Status: ACTIVE | Noted: 2021-07-22

## 2021-07-22 PROBLEM — K57.32 DIVERTICULITIS OF LARGE INTESTINE WITHOUT PERFORATION OR ABSCESS WITHOUT BLEEDING: Status: ACTIVE | Noted: 2021-07-22

## 2021-07-22 PROBLEM — K86.1 CHRONIC PANCREATITIS (HCC): Status: ACTIVE | Noted: 2021-07-22

## 2021-07-22 PROBLEM — L59.0 ERYTHEMA AB IGNE: Status: ACTIVE | Noted: 2021-07-22

## 2021-07-22 LAB
AFP: 7.3 UG/L
ALBUMIN SERPL-MCNC: 3 G/DL (ref 3.5–5.2)
ALBUMIN/GLOBULIN RATIO: 0.8 (ref 1–2.5)
ALP BLD-CCNC: 226 U/L (ref 35–104)
ALT SERPL-CCNC: 9 U/L (ref 5–33)
AMYLASE: 78 U/L (ref 28–100)
ANION GAP SERPL CALCULATED.3IONS-SCNC: 14 MMOL/L (ref 9–17)
AST SERPL-CCNC: 23 U/L
BILIRUB SERPL-MCNC: 0.46 MG/DL (ref 0.3–1.2)
BNP INTERPRETATION: ABNORMAL
BUN BLDV-MCNC: 14 MG/DL (ref 6–20)
BUN/CREAT BLD: ABNORMAL (ref 9–20)
C-REACTIVE PROTEIN: 326.9 MG/L (ref 0–5)
CA 19-9: 21 U/ML (ref 0–35)
CALCIUM IONIZED: 1.3 MMOL/L (ref 1.13–1.33)
CALCIUM SERPL-MCNC: 9.1 MG/DL (ref 8.6–10.4)
CHLORIDE BLD-SCNC: 101 MMOL/L (ref 98–107)
CO2: 21 MMOL/L (ref 20–31)
CREAT SERPL-MCNC: 0.29 MG/DL (ref 0.5–0.9)
GFR AFRICAN AMERICAN: >60 ML/MIN
GFR NON-AFRICAN AMERICAN: >60 ML/MIN
GFR SERPL CREATININE-BSD FRML MDRD: ABNORMAL ML/MIN/{1.73_M2}
GFR SERPL CREATININE-BSD FRML MDRD: ABNORMAL ML/MIN/{1.73_M2}
GGT: 318 U/L (ref 5–36)
GLUCOSE BLD-MCNC: 104 MG/DL (ref 70–99)
HCT VFR BLD CALC: 41.1 % (ref 36.3–47.1)
HEMOGLOBIN: 13 G/DL (ref 11.9–15.1)
INR BLD: 0.9
LACTIC ACID, SEPSIS WHOLE BLOOD: 0.6 MMOL/L (ref 0.5–1.9)
LACTIC ACID, SEPSIS: NORMAL MMOL/L (ref 0.5–1.9)
LACTIC ACID, WHOLE BLOOD: 0.8 MMOL/L (ref 0.7–2.1)
LIPASE: 67 U/L (ref 13–60)
LV EF: 63 %
LVEF MODALITY: NORMAL
MAGNESIUM: 1.7 MG/DL (ref 1.6–2.6)
MCH RBC QN AUTO: 29.3 PG (ref 25.2–33.5)
MCHC RBC AUTO-ENTMCNC: 31.6 G/DL (ref 28.4–34.8)
MCV RBC AUTO: 92.6 FL (ref 82.6–102.9)
NRBC AUTOMATED: 0 PER 100 WBC
PDW BLD-RTO: 13.9 % (ref 11.8–14.4)
PLATELET # BLD: 372 K/UL (ref 138–453)
PMV BLD AUTO: 9.6 FL (ref 8.1–13.5)
POTASSIUM SERPL-SCNC: 4 MMOL/L (ref 3.7–5.3)
PRO-BNP: 1084 PG/ML
PROCALCITONIN: 0.09 NG/ML
PROTHROMBIN TIME: 9.6 SEC (ref 9.1–12.3)
RBC # BLD: 4.44 M/UL (ref 3.95–5.11)
SARS-COV-2, RAPID: NOT DETECTED
SODIUM BLD-SCNC: 136 MMOL/L (ref 135–144)
SPECIMEN DESCRIPTION: NORMAL
TOTAL PROTEIN: 6.8 G/DL (ref 6.4–8.3)
WBC # BLD: 22 K/UL (ref 3.5–11.3)

## 2021-07-22 PROCEDURE — 84145 PROCALCITONIN (PCT): CPT

## 2021-07-22 PROCEDURE — 99233 SBSQ HOSP IP/OBS HIGH 50: CPT | Performed by: FAMILY MEDICINE

## 2021-07-22 PROCEDURE — APPSS30 APP SPLIT SHARED TIME 16-30 MINUTES: Performed by: PHYSICIAN ASSISTANT

## 2021-07-22 PROCEDURE — 6360000002 HC RX W HCPCS: Performed by: NURSE PRACTITIONER

## 2021-07-22 PROCEDURE — 85610 PROTHROMBIN TIME: CPT

## 2021-07-22 PROCEDURE — 2580000003 HC RX 258: Performed by: FAMILY MEDICINE

## 2021-07-22 PROCEDURE — 6370000000 HC RX 637 (ALT 250 FOR IP): Performed by: NURSE PRACTITIONER

## 2021-07-22 PROCEDURE — 99254 IP/OBS CNSLTJ NEW/EST MOD 60: CPT | Performed by: NURSE PRACTITIONER

## 2021-07-22 PROCEDURE — 6360000002 HC RX W HCPCS: Performed by: INTERNAL MEDICINE

## 2021-07-22 PROCEDURE — 82330 ASSAY OF CALCIUM: CPT

## 2021-07-22 PROCEDURE — 2580000003 HC RX 258: Performed by: NURSE PRACTITIONER

## 2021-07-22 PROCEDURE — 87635 SARS-COV-2 COVID-19 AMP PRB: CPT

## 2021-07-22 PROCEDURE — 36415 COLL VENOUS BLD VENIPUNCTURE: CPT

## 2021-07-22 PROCEDURE — 86140 C-REACTIVE PROTEIN: CPT

## 2021-07-22 PROCEDURE — 83880 ASSAY OF NATRIURETIC PEPTIDE: CPT

## 2021-07-22 PROCEDURE — 83690 ASSAY OF LIPASE: CPT

## 2021-07-22 PROCEDURE — 80053 COMPREHEN METABOLIC PANEL: CPT

## 2021-07-22 PROCEDURE — 82977 ASSAY OF GGT: CPT

## 2021-07-22 PROCEDURE — 74018 RADEX ABDOMEN 1 VIEW: CPT

## 2021-07-22 PROCEDURE — 2700000000 HC OXYGEN THERAPY PER DAY

## 2021-07-22 PROCEDURE — 71045 X-RAY EXAM CHEST 1 VIEW: CPT

## 2021-07-22 PROCEDURE — 6370000000 HC RX 637 (ALT 250 FOR IP): Performed by: FAMILY MEDICINE

## 2021-07-22 PROCEDURE — 94761 N-INVAS EAR/PLS OXIMETRY MLT: CPT

## 2021-07-22 PROCEDURE — 94640 AIRWAY INHALATION TREATMENT: CPT

## 2021-07-22 PROCEDURE — 83735 ASSAY OF MAGNESIUM: CPT

## 2021-07-22 PROCEDURE — 83605 ASSAY OF LACTIC ACID: CPT

## 2021-07-22 PROCEDURE — 2060000000 HC ICU INTERMEDIATE R&B

## 2021-07-22 PROCEDURE — 82150 ASSAY OF AMYLASE: CPT

## 2021-07-22 PROCEDURE — 93306 TTE W/DOPPLER COMPLETE: CPT

## 2021-07-22 PROCEDURE — 85027 COMPLETE CBC AUTOMATED: CPT

## 2021-07-22 RX ORDER — FENTANYL CITRATE 50 UG/ML
50 INJECTION, SOLUTION INTRAMUSCULAR; INTRAVENOUS
Status: DISCONTINUED | OUTPATIENT
Start: 2021-07-22 | End: 2021-07-28

## 2021-07-22 RX ORDER — PREDNISONE 20 MG/1
40 TABLET ORAL DAILY
Status: DISCONTINUED | OUTPATIENT
Start: 2021-07-22 | End: 2021-07-24

## 2021-07-22 RX ORDER — ALBUTEROL SULFATE 2.5 MG/3ML
2.5 SOLUTION RESPIRATORY (INHALATION)
Status: DISCONTINUED | OUTPATIENT
Start: 2021-07-22 | End: 2021-08-03 | Stop reason: HOSPADM

## 2021-07-22 RX ORDER — POLYETHYLENE GLYCOL 3350 17 G/17G
17 POWDER, FOR SOLUTION ORAL 3 TIMES DAILY PRN
Status: DISCONTINUED | OUTPATIENT
Start: 2021-07-22 | End: 2021-07-25

## 2021-07-22 RX ORDER — METOCLOPRAMIDE HYDROCHLORIDE 5 MG/ML
5 INJECTION INTRAMUSCULAR; INTRAVENOUS ONCE
Status: COMPLETED | OUTPATIENT
Start: 2021-07-22 | End: 2021-07-22

## 2021-07-22 RX ORDER — SODIUM CHLORIDE 9 MG/ML
INJECTION, SOLUTION INTRAVENOUS CONTINUOUS
Status: DISCONTINUED | OUTPATIENT
Start: 2021-07-22 | End: 2021-07-24

## 2021-07-22 RX ADMIN — OXYCODONE HYDROCHLORIDE AND ACETAMINOPHEN 2 TABLET: 5; 325 TABLET ORAL at 02:42

## 2021-07-22 RX ADMIN — FERROUS SULFATE TAB EC 325 MG (65 MG FE EQUIVALENT) 325 MG: 325 (65 FE) TABLET DELAYED RESPONSE at 20:58

## 2021-07-22 RX ADMIN — BUDESONIDE AND FORMOTEROL FUMARATE DIHYDRATE 2 PUFF: 160; 4.5 AEROSOL RESPIRATORY (INHALATION) at 09:07

## 2021-07-22 RX ADMIN — FOLIC ACID 1 MG: 1 TABLET ORAL at 09:26

## 2021-07-22 RX ADMIN — FERROUS SULFATE TAB EC 325 MG (65 MG FE EQUIVALENT) 325 MG: 325 (65 FE) TABLET DELAYED RESPONSE at 09:26

## 2021-07-22 RX ADMIN — CARBAMAZEPINE 200 MG: 100 TABLET, EXTENDED RELEASE ORAL at 20:58

## 2021-07-22 RX ADMIN — HYDROXYZINE HYDROCHLORIDE 25 MG: 25 TABLET ORAL at 15:21

## 2021-07-22 RX ADMIN — ENOXAPARIN SODIUM 40 MG: 40 INJECTION SUBCUTANEOUS at 15:24

## 2021-07-22 RX ADMIN — AMLODIPINE BESYLATE 5 MG: 5 TABLET ORAL at 09:26

## 2021-07-22 RX ADMIN — PREGABALIN 200 MG: 100 CAPSULE ORAL at 09:25

## 2021-07-22 RX ADMIN — OXYCODONE HYDROCHLORIDE AND ACETAMINOPHEN 2 TABLET: 5; 325 TABLET ORAL at 23:15

## 2021-07-22 RX ADMIN — METOCLOPRAMIDE 5 MG: 5 INJECTION, SOLUTION INTRAMUSCULAR; INTRAVENOUS at 05:18

## 2021-07-22 RX ADMIN — BUSPIRONE HYDROCHLORIDE 15 MG: 15 TABLET ORAL at 09:27

## 2021-07-22 RX ADMIN — BUSPIRONE HYDROCHLORIDE 15 MG: 15 TABLET ORAL at 20:58

## 2021-07-22 RX ADMIN — CARBAMAZEPINE 200 MG: 100 TABLET, EXTENDED RELEASE ORAL at 09:26

## 2021-07-22 RX ADMIN — SODIUM CHLORIDE: 9 INJECTION, SOLUTION INTRAVENOUS at 15:00

## 2021-07-22 RX ADMIN — POTASSIUM CHLORIDE 20 MEQ: 1500 TABLET, EXTENDED RELEASE ORAL at 09:26

## 2021-07-22 RX ADMIN — OXYCODONE HYDROCHLORIDE AND ACETAMINOPHEN 2 TABLET: 5; 325 TABLET ORAL at 05:57

## 2021-07-22 RX ADMIN — SODIUM CHLORIDE, PRESERVATIVE FREE 10 ML: 5 INJECTION INTRAVENOUS at 10:00

## 2021-07-22 RX ADMIN — BUSPIRONE HYDROCHLORIDE 15 MG: 15 TABLET ORAL at 15:21

## 2021-07-22 RX ADMIN — FENTANYL CITRATE 50 MCG: 50 INJECTION, SOLUTION INTRAMUSCULAR; INTRAVENOUS at 03:58

## 2021-07-22 RX ADMIN — PREDNISONE 40 MG: 20 TABLET ORAL at 17:41

## 2021-07-22 RX ADMIN — BACLOFEN 10 MG: 10 TABLET ORAL at 20:58

## 2021-07-22 RX ADMIN — PREGABALIN 200 MG: 100 CAPSULE ORAL at 15:11

## 2021-07-22 RX ADMIN — HYDROXYZINE HYDROCHLORIDE 25 MG: 25 TABLET ORAL at 23:08

## 2021-07-22 RX ADMIN — TIOTROPIUM BROMIDE INHALATION SPRAY 2 PUFF: 3.12 SPRAY, METERED RESPIRATORY (INHALATION) at 09:28

## 2021-07-22 RX ADMIN — ACETAMINOPHEN 650 MG: 325 TABLET ORAL at 21:52

## 2021-07-22 RX ADMIN — OXYCODONE HYDROCHLORIDE AND ACETAMINOPHEN 2 TABLET: 5; 325 TABLET ORAL at 15:12

## 2021-07-22 RX ADMIN — BACLOFEN 10 MG: 10 TABLET ORAL at 09:27

## 2021-07-22 RX ADMIN — HYDROXYZINE HYDROCHLORIDE 25 MG: 25 TABLET ORAL at 09:29

## 2021-07-22 RX ADMIN — PIPERACILLIN AND TAZOBACTAM 3375 MG: 3; .375 INJECTION, POWDER, FOR SOLUTION INTRAVENOUS at 17:41

## 2021-07-22 RX ADMIN — ROPINIROLE HYDROCHLORIDE 1 MG: 1 TABLET, FILM COATED ORAL at 20:58

## 2021-07-22 RX ADMIN — FLUOXETINE 10 MG: 10 CAPSULE ORAL at 15:23

## 2021-07-22 RX ADMIN — BACLOFEN 10 MG: 10 TABLET ORAL at 15:12

## 2021-07-22 RX ADMIN — PIPERACILLIN AND TAZOBACTAM 3375 MG: 3; .375 INJECTION, POWDER, FOR SOLUTION INTRAVENOUS at 09:36

## 2021-07-22 RX ADMIN — VANCOMYCIN HYDROCHLORIDE 1000 MG: 1 INJECTION, SOLUTION INTRAVENOUS at 09:30

## 2021-07-22 RX ADMIN — Medication 3 MG: at 02:42

## 2021-07-22 RX ADMIN — VANCOMYCIN HYDROCHLORIDE 1000 MG: 1 INJECTION, SOLUTION INTRAVENOUS at 21:52

## 2021-07-22 RX ADMIN — PREGABALIN 200 MG: 100 CAPSULE ORAL at 20:58

## 2021-07-22 ASSESSMENT — ENCOUNTER SYMPTOMS
CONSTIPATION: 0
BLOOD IN STOOL: 0
DIARRHEA: 0
NAUSEA: 0
COUGH: 1
CHEST TIGHTNESS: 0
VOMITING: 0
ABDOMINAL PAIN: 1
BACK PAIN: 1
WHEEZING: 0
SHORTNESS OF BREATH: 1

## 2021-07-22 ASSESSMENT — PAIN SCALES - GENERAL
PAINLEVEL_OUTOF10: 7
PAINLEVEL_OUTOF10: 9
PAINLEVEL_OUTOF10: 0
PAINLEVEL_OUTOF10: 8
PAINLEVEL_OUTOF10: 8
PAINLEVEL_OUTOF10: 0
PAINLEVEL_OUTOF10: 10
PAINLEVEL_OUTOF10: 10
PAINLEVEL_OUTOF10: 4

## 2021-07-22 NOTE — H&P
[facility]  Office: [address], DO, Melecio Harry, DO, José Miguel Wagner, DO, Apurva Hunag, DO, Ashley Nagel MD, Anita De La Rosa MD, Fortunato Gentile MD, Maria M Koenig MD, Rhea Wylie MD, Iveth Mcmullen MD, Jim Almeida MD, Haskell Leventhal, DO, Saint Goodwill, MD, Rogelio Bowles DO, Cheryle Bumpers, MD,  Kurt Goldman DO, Amy Pack MD, Magalie Sinha MD, Shant Smith MD, Anmol Bernard MD, Ramone Daniels MD, Kalvin Spurling, MD, John Moreira, [facility], [facility], [facility], Cherie Shearer, CNP, Karen Small, CNS, George Chang, CNP, Yen Delgado, CNP, Aida Corona, CNP, Tre Gee, CNP, Carissa Odonnell, CNP, Mercedez Alva PA-C, Desire Colbert, [facility], Dawn Cheema, CNP, Yelena Gordon, CNP, Carl Gupta, CNP, Carlos Morillo, CNP, Cynthia Vaughn, CNP, Alison Oliveros, CNP, Latoya Mayes, CNP, Jose Angel Thibodeaux, CNP         [address]    HISTORY AND PHYSICAL EXAMINATION            Date:   7/21/2021  Patient name:  Crystal Linares  Date of admission:  7/21/2021  3:06 PM  MRN:   [id number]  Account:  [de-identified]  YOB: 1970  PCP:    Glenda Dunne MD  Room:   2022/2022-01  Code Status:    Full Code    Chief Complaint:     Chief Complaint   Patient presents with    Back Pain     states chronic back pain becoming worse.  has recently started physical therapy       History Obtained From:     patient, electronic medical record    History of Present Illness:     Crystal Linares is a 45 y.o. Non- / non  female who presents with Back Pain (states chronic back pain becoming worse.  has recently started physical therapy)   and is admitted to the hospital for the management of Sepsis ([facility] [address]     This is a 43-year-old female with underlying history of hypertension, COPD, anxiety/depression, UTI, thoracic compression fractures, peripheral neuropathy, migraine headaches, seizure disorder, and alcoholism (last drink 6 months ago) who presents the emergency department with generalized malaise, headache and back pain. Symptoms began 2 to 3 days ago and have continued to worsen. She denies any recent falls or traumatic injury. Describes headache as constant but denies any associated neck pain or tension, and denies any particular aggravating or alleviating factors. Back pain is described as \"tightness and spasms\". She denies any chest pain, shortness of breath, abdominal pain, nausea/vomiting, diarrhea, dizziness/lightheadedness, vision changes, cough, myalgias or fevers. She denies any dysuria, hematuria or changes in urinary habits. Patient also describes poor appetite with limited p.o. intake over the last 2 to 3 days and has not taken any home medications medications over this time. Initial lab work revealed sodium: 134, potassium: 4.6, chloride: 94, BUN: 0.41, lactic acid: 1.4, glucose: 128, alk phos: 283, WBC: 21.5, platelets: 795, urinalysis and urine culture are pending, CT chest/abdomen/ pelvis: Probable acute diverticulitis/colitis of the left colon without evidence of perforation or abscess, chronic pancreatitis, multiple compression fractures, lingular groundglass infiltrate, intra-atrial lipoma of the right atrium. CT thoracic spine: Decreased height and T6 compared to previous study demonstrating worsening compression with subacute etiology, CT lumbar spine: No traumatic malalignment, compression fracture of the superior L5 with subacute appearance and calcifications in the abdomen suggesting pancreatic calcifications. Patient tachycardic, tachypneic and hypothermic on initial evaluation in obvious discomfort. Sepsis protocol was initiated after lab findings with 30ml/kg fluid bolus and initiation of broad-spectrum antibiotics. Source unidentified at this time. Patient is admitted to [facility] for further management.     On my evaluation, patient alert and oriented x4, resting in the fetal position and refusing to wear telemetry. She is denying any chest pain or shortness of breath. Denies any abdominal pain but reports being constipated with last bowel movement being 2 days ago. No nausea or vomiting noted. Mottling is noted to the patient's back with bilateral flank pain on palpation. Patient does agree to spot checking vital signs and is requesting pain medication. As above, admits to not taking home medications due to feeling so poorly over the last few days. She does feel as though she will be able to tolerate oral medications at this time. Patient is hemodynamically stable at this time.     Past Medical History:     Past Medical History:   Diagnosis Date    Acute respiratory failure with hypoxia ([address] 10/16/2020    Alcohol withdrawal syndrome, with delirium ([address] 12/14/2019    Alcoholism ([address]     Anemia 10/2020    GI bleed    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24, the patient underwent 2 surgical resections without known recurrence 10/23/2020    Closed fracture of lateral portion of left tibial plateau 98/46/4109    COPD (chronic obstructive pulmonary disease) ([address]     CO2 retainer, on Bipap at night for this, Dr. Patrick Blue ( last visit 11/20/2020 and note on chart )    Depression     bipolar, major depressive disorder, ptsd, anxiety    Dysphagia     GI bleed 10/2020    Hypertension     Memory loss     Oxygen dependent     pt stated not needed as of 12/9/2020    Pain, joint, ankle and foot     Pancreatic lesion 10/2020    Dr. Andrea Tom working up pt    Peripheral neuropathy     Seizures ([facility] [address]     also baseline tremors-last sz summer 2020    Tension headache     Under care of team 07/01/2021    neuro-Dr Wan-st [facility]-last visit june 2021    Under care of team 07/01/2021    pain management-Hamzah Martines-nery jimenez-last visit june 2021    Under care of team 07/01/2021    pulmonology-Dr Dueñas-[address] [facility]-last virtual visit feb 2021    Under care of team 07/01/2021 psych-moniquet NP-telemed-last visit may 2021    Under care of team 07/01/2021    eh[address]-last visit june 2021    Wellness examination 07/01/2021    pcp-Ludivina grimes-last visit may 2021        Past Surgical History:     Past Surgical History:   Procedure Laterality Date    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    COLONOSCOPY N/A 10/22/2020    COLONOSCOPY DIAGNOSTIC performed by Isabella Bermudez MD at [place] [address] (LOWER) N/A 12/9/2020    ENDOSCOPIC ULTRASOUND, UPPER WITH LINEAR SCOPE FOR BIOPSY OF MASS ON HEAD OF PANCREAS performed by Pepper Brown MD at [address] Left 7-3-13    ORIF tibial plateau    FRACTURE SURGERY Right     small finger metacarpal fracture    HAND SURGERY      pins    HYSTERECTOMY  2003    UPPER GASTROINTESTINAL ENDOSCOPY N/A 10/22/2020    EGD BIOPSY performed by Isabella Bermudez MD at [facility] N/A 4/5/2021    EGD BIOPSY performed by Isabella Bermudez MD at [facility]        Medications Prior to Admission:     Prior to Admission medications    Medication Sig Start Date End Date Taking? Authorizing Provider   acamprosate (CAMPRAL) 333 MG tablet Take 2 tablets by mouth 3 times daily 7/16/21 8/15/21  LOI Castillo NP   sertraline (ZOLOFT) 50 MG tablet Take 3 tablets by mouth daily for 7 days, THEN 2 tablets daily for 7 days, THEN 1 tablet daily for 7 days. 7/16/21 8/6/21  LOI Castillo NP   FLUoxetine (PROZAC) 20 MG tablet Take 0.5 tablets by mouth daily for 7 days, THEN 1 tablet daily for 23 days.  7/16/21 8/15/21  LOI Castillo NP   traZODone (DESYREL) 50 MG tablet TAKE 1 AND 1/2 TABLET BY MOUTH ONCE NIGHTLY AS NEEDED FOR SLEEP 7/16/21   LOI Castillo NP   thermotabs (MEDI-LYTE) TABS tablet Take 1 tablet by mouth daily for 3 days 7/9/21 7/12/21  LOI Hdez NP   carBAMazepine (TEGRETOL XR) 200 MG extended release tablet Take 1 tablet by mouth 2 times daily 7/6/21   Thaddeus Rivera MD   topiramate (TOPAMAX) 25 MG tablet Take 2 tablets by mouth daily 7/6/21   Thaddeus Rivera MD   pregabalin (LYRICA) 200 MG capsule Take 1 capsule by mouth 3 times daily for 90 days.  7/2/21 9/30/21  Thaddeus Rivera MD   KLOR-CON M20 20 MEQ extended release tablet TAKE ONE TABLET BY MOUTH DAILY 6/24/21   Ludivina Pisano MD   amLODIPine (NORVASC) 5 MG tablet TAKE ONE TABLET BY MOUTH DAILY 6/3/21   Ludivina Pisano MD   busPIRone (BUSPAR) 15 MG tablet Take 1 tablet by mouth 3 times daily 5/5/21   Ludivina Pisano MD   baclofen (LIORESAL) 10 MG tablet Take 1 tablet by mouth 3 times daily 5/25/21   Ludivina Pisano MD   ibuprofen (ADVIL;MOTRIN) 600 MG tablet Take 1 tablet by mouth 3 times daily as needed for Pain 5/25/21   Ludivina Pisano MD   sertraline (ZOLOFT) 100 MG tablet Take 2 tablets by mouth daily 5/17/21   LOI Prado NP   ferrous sulfate (IRON 325) 325 (65 Fe) MG tablet Take 1 tablet by mouth 2 times daily 4/22/21   Ludivina Pisano MD   rOPINIRole (REQUIP) 1 MG tablet Take 1 tablet by mouth nightly 4/1/21   Ludivina Pisano MD   budesonide-formoterol [facility]) 160-4.5 MCG/ACT AERO Inhale 2 puffs into the lungs 2 times daily 3/31/21   Ludivina Pisano MD   hydrOXYzine (ATARAX) 50 MG tablet TAKE ONE TABLET BY MOUTH THREE TIMES A DAY 3/12/21   Ludivina Pisano MD   tiotropium (SPIRIVA RESPIMAT) 2.5 MCG/ACT AERS inhaler Inhale 2 puffs into the lungs daily 2/26/21 7/8/21  Pawel Lemon MD   sodium chloride (ALTAMIST SPRAY) 0.65 % nasal spray 1 spray by Nasal route as needed for Congestion 12/28/20   Ludivina Pisano MD   ACETAMINOPHEN EXTRA STRENGTH 500 MG tablet Take 500 mg by mouth 3 times daily as needed 5/8/20   Historical Provider, MD   albuterol sulfate HFA (VENTOLIN HFA) 108 (90 Base) MCG/ACT inhaler Inhale 2 puffs into the lungs 4 times daily as needed for Wheezing 6/11/20   Ludivina Pisano, MD   vitamin B-1 (THIAMINE) 100 MG tablet Take 1 tablet by mouth daily 7/12/19   Ludivina Barrientos MD   vitamin D (ERGOCALCIFEROL) 07753 units CAPS capsule Take 1 capsule by mouth once a week 6/19/19   Ludivina Barrientos MD   folic acid (FOLVITE) 1 MG tablet Take 1 tablet by mouth daily 6/19/19   Ludivina Barrientos MD        Allergies:     Patient has no known allergies. Social History:     Tobacco:    reports that she has been smoking cigarettes. She has a 15.00 pack-year smoking history. She has never used smokeless tobacco.  Alcohol:      reports previous alcohol use. Drug Use:  reports current drug use. Frequency: 2.00 times per week. Drug: Marijuana. Family History:     Family History   Problem Relation Age of Onset    Other Father     Cancer Maternal Grandmother     Heart Disease Paternal Grandmother     Esophageal Cancer Maternal Aunt        Review of Systems:     Positive and Negative as described in HPI. Review of Systems   Constitutional: Positive for activity change, appetite change and fatigue. Negative for diaphoresis and fever. HENT: Negative for sore throat and trouble swallowing. Eyes: Negative for photophobia, pain and visual disturbance. Respiratory: Negative for cough, chest tightness, wheezing and stridor. Cardiovascular: Negative for chest pain, palpitations and leg swelling. Gastrointestinal: Positive for constipation. Negative for abdominal distention, abdominal pain, diarrhea, nausea and vomiting. Endocrine: Negative for polyphagia and polyuria. Genitourinary: Positive for flank pain. Negative for decreased urine volume, difficulty urinating, dysuria, frequency, hematuria and urgency. Musculoskeletal: Positive for back pain. Negative for neck pain and neck stiffness. Skin: Positive for color change. Negative for rash and wound. Neurological: Positive for headaches.  Negative for dizziness, seizures, facial asymmetry, speech difficulty, weakness and light-headedness. Hematological: Negative for adenopathy. Psychiatric/Behavioral: Negative for agitation, behavioral problems and confusion. The patient is not nervous/anxious. Physical Exam:   /79   Pulse 95   Temp 98.2 °F (36.8 °C) (Oral)   Resp 19   Ht 5' 5\" (1.651 m)   Wt 140 lb (63.5 kg)   SpO2 94%   BMI 23.30 kg/m²   Temp (24hrs), Av.6 °F (36.4 °C), Min:96.6 °F (35.9 °C), Max:98.2 °F (36.8 °C)    Recent Labs     21  1551   POCGLU 141*       Intake/Output Summary (Last 24 hours) at 2021  Last data filed at 2021  Gross per 24 hour   Intake 2050 ml   Output --   Net 2050 ml       Physical Exam  Vitals and nursing note reviewed. Constitutional:       General: She is not in acute distress. Appearance: She is ill-appearing. She is not toxic-appearing or diaphoretic. HENT:      Head: Normocephalic and atraumatic. Right Ear: External ear normal.      Left Ear: External ear normal.      Nose: Nose normal. No congestion or rhinorrhea. Mouth/Throat:      Mouth: Mucous membranes are dry. Pharynx: Oropharynx is clear. Eyes:      Extraocular Movements: Extraocular movements intact. Conjunctiva/sclera: Conjunctivae normal.      Pupils: Pupils are equal, round, and reactive to light. Cardiovascular:      Rate and Rhythm: Regular rhythm. Tachycardia present. Pulses: Normal pulses. Heart sounds: Normal heart sounds. No murmur heard. No friction rub. No gallop. Pulmonary:      Effort: Pulmonary effort is normal. No respiratory distress. Breath sounds: Normal breath sounds. No wheezing, rhonchi or rales. Abdominal:      General: Bowel sounds are normal. There is no distension. Palpations: Abdomen is soft. There is no mass. Tenderness: There is no abdominal tenderness. Musculoskeletal:         General: No tenderness or signs of injury. Cervical back: Normal range of motion and neck supple.  No rigidity or tenderness. Right lower leg: No edema. Left lower leg: No edema. Skin:     General: Skin is warm and dry. Capillary Refill: Capillary refill takes less than 2 seconds. Coloration: Skin is not jaundiced. Findings: No erythema or rash. Comments: Mottling to back   Neurological:      General: No focal deficit present. Mental Status: She is alert and oriented to person, place, and time. Mental status is at baseline. Cranial Nerves: No cranial nerve deficit. Sensory: No sensory deficit. Motor: No weakness. Psychiatric:         Mood and Affect: Mood normal.         Behavior: Behavior normal.         Thought Content:  Thought content normal.         Judgment: Judgment normal.         Investigations:      Laboratory Testing:  Recent Results (from the past 24 hour(s))   Venous Blood Gas, POC    Collection Time: 07/21/21  3:51 PM   Result Value Ref Range    pH, Calvin 7.416 7.320 - 7.430    pCO2, Calvin 42.8 41.0 - 51.0 mm Hg    pO2, Calvin 28.7 (L) 30 - 50 mm Hg    HCO3, Venous 27.5 22.0 - 29.0 mmol/L    Total CO2, Venous NOT REPORTED 23.0 - 30.0 mmol/L    Negative Base Excess, Calvin NOT REPORTED 0.0 - 2.0    Positive Base Excess, Calvin 2 0.0 - 3.0    O2 Sat, Calvin 55 (L) 60.0 - 85.0 %    O2 Device/Flow/% NOT REPORTED     Manav Test NOT REPORTED     Sample Site NOT REPORTED     Mode NOT REPORTED     FIO2 NOT REPORTED     Pt Temp NOT REPORTED     POC pH Temp NOT REPORTED     POC pCO2 Temp NOT REPORTED mm Hg    POC pO2 Temp NOT REPORTED mm Hg   ELECTROLYTES PLUS    Collection Time: 07/21/21  3:51 PM   Result Value Ref Range    POC Sodium 139 138 - 146 mmol/L    POC Potassium 4.5 3.5 - 4.5 mmol/L    POC Chloride 101 98 - 107 mmol/L    POC TCO2 28 22 - 30 mmol/L    Anion Gap 11 7 - 16 mmol/L   Hemoglobin and hematocrit, blood    Collection Time: 07/21/21  3:51 PM   Result Value Ref Range    POC Hemoglobin 17.0 (H) 12.0 - 16.0 g/dL    POC Hematocrit 50 (H) 36 - 46 %   Creatinine W/GFR Point of Care    Collection Time: 07/21/21  3:51 PM   Result Value Ref Range    POC Creatinine 0.54 0.51 - 1.19 mg/dL    GFR Comment >60 >60 mL/min    GFR Non-African American >60 >60 mL/min    GFR Comment         CALCIUM, IONIC (POC)    Collection Time: 07/21/21  3:51 PM   Result Value Ref Range    POC Ionized Calcium 1.18 1.15 - 1.33 mmol/L   POCT urea (BUN)    Collection Time: 07/21/21  3:51 PM   Result Value Ref Range    POC BUN 14 8 - 26 mg/dL   Lactic Acid, POC    Collection Time: 07/21/21  3:51 PM   Result Value Ref Range    POC Lactic Acid 1.08 0.56 - 1.39 mmol/L   POCT Glucose    Collection Time: 07/21/21  3:51 PM   Result Value Ref Range    POC Glucose 141 (H) 74 - 100 mg/dL   CBC Auto Differential    Collection Time: 07/21/21  4:00 PM   Result Value Ref Range    WBC 21.5 (H) 3.5 - 11.3 k/uL    RBC 5.08 3.95 - 5.11 m/uL    Hemoglobin 14.9 11.9 - 15.1 g/dL    Hematocrit 44.3 36.3 - 47.1 %    MCV 87.2 82.6 - 102.9 fL    MCH 29.3 25.2 - 33.5 pg    MCHC 33.6 28.4 - 34.8 g/dL    RDW 13.6 11.8 - 14.4 %    Platelets 629 (H) 752 - 453 k/uL    MPV 9.7 8.1 - 13.5 fL    NRBC Automated 0.0 0.0 per 100 WBC    Differential Type NOT REPORTED     WBC Morphology NOT REPORTED     RBC Morphology NOT REPORTED     Platelet Estimate NOT REPORTED     Immature Granulocytes 0 0 %    Seg Neutrophils 89 (H) 36 - 66 %    Lymphocytes 5 (L) 24 - 44 %    Atypical Lymphocytes 1 %    Monocytes 4 1 - 7 %    Eosinophils % 0 (L) 1 - 4 %    Basophils 1 0 - 2 %    Absolute Immature Granulocyte 0.00 0.00 - 0.30 k/uL    Segs Absolute 19.12 (H) 1.8 - 7.7 k/uL    Absolute Lymph # 1.08 1.0 - 4.8 k/uL    Atypical Lymphocytes Absolute 0.22 k/uL    Absolute Mono # 0.86 (H) 0.1 - 0.8 k/uL    Absolute Eos # 0.00 0.0 - 0.4 k/uL    Basophils Absolute 0.22 (H) 0.0 - 0.2 k/uL    Morphology Normal    Comprehensive Metabolic Panel    Collection Time: 07/21/21  4:00 PM   Result Value Ref Range    Glucose 128 (H) 70 - 99 mg/dL    BUN 15 6 - 20 mg/dL    CREATININE 0.41 (L) 0.50 - 0.90 mg/dL    Bun/Cre Ratio NOT REPORTED 9 - 20    Calcium 10.3 8.6 - 10.4 mg/dL    Sodium 134 (L) 135 - 144 mmol/L    Potassium 4.6 3.7 - 5.3 mmol/L    Chloride 94 (L) 98 - 107 mmol/L    CO2 25 20 - 31 mmol/L    Anion Gap 15 9 - 17 mmol/L    Alkaline Phosphatase 283 (H) 35 - 104 U/L    ALT 12 5 - 33 U/L    AST 24 <32 U/L    Total Bilirubin 0.68 0.3 - 1.2 mg/dL    Total Protein 8.5 (H) 6.4 - 8.3 g/dL    Albumin 4.0 3.5 - 5.2 g/dL    Albumin/Globulin Ratio 0.9 (L) 1.0 - 2.5    GFR Non-African American >60 >60 mL/min    GFR African American >60 >60 mL/min    GFR Comment          GFR Staging NOT REPORTED    Lactate, Sepsis    Collection Time: 07/21/21  4:00 PM   Result Value Ref Range    Lactic Acid, Sepsis NOT REPORTED 0.5 - 1.9 mmol/L    Lactic Acid, Sepsis, Whole Blood 1.4 0.5 - 1.9 mmol/L   HCG Qualitative, Serum    Collection Time: 07/21/21  4:00 PM   Result Value Ref Range    hCG Qual NEGATIVE NEGATIVE   Lactic Acid, Whole Blood    Collection Time: 07/21/21  6:00 PM   Result Value Ref Range    Lactic Acid, Whole Blood 1.1 0.7 - 2.1 mmol/L       Imaging/Diagnostics:  CT CHEST ABDOMEN PELVIS W CONTRAST    Result Date: 7/21/2021  Probable acute diverticulitis or colitis left colon. No evidence of perforation or abscess at this time. Chronic pancreatitis. Multiple compression fractures some of which are new since the previous exam. Lingular ground-glass infiltrate. Recommend follow-up to resolution. Intra-atrial lipoma right atrium. Cardiac echo may be helpful. CT LUMBAR SPINE TRAUMA RECONSTRUCTION    Result Date: 7/21/2021  CT thoracic spine: 1. Decrease in height in T6 compared to prior study compatible with worsening compression with subacute etiology and mild protrusion of the dorsal aspect of T6. Narrowed T6-T7 neural foramina. 2.  Chronic superior height T8, T9, and T11 without interval change from prior study.  3.  Mild prevertebral soft tissue prominence T6 without evidence of abscess formation. CT lumbar spine: 1. No traumatic malalignment. 2.  Compression fracture superior L5 with subacute appearance. 3.  Calcifications in the abdomen incompletely included appearance suggesting pancreatic calcifications. RECOMMENDATIONS: Please reference CT chest, abdomen and pelvis of same day. CT THORACIC SPINE TRAUMA RECONSTRUCTION    Result Date: 7/21/2021  CT thoracic spine: 1. Decrease in height in T6 compared to prior study compatible with worsening compression with subacute etiology and mild protrusion of the dorsal aspect of T6. Narrowed T6-T7 neural foramina. 2.  Chronic superior height T8, T9, and T11 without interval change from prior study. 3.  Mild prevertebral soft tissue prominence T6 without evidence of abscess formation. CT lumbar spine: 1. No traumatic malalignment. 2.  Compression fracture superior L5 with subacute appearance. 3.  Calcifications in the abdomen incompletely included appearance suggesting pancreatic calcifications. RECOMMENDATIONS: Please reference CT chest, abdomen and pelvis of same day.        Assessment :      Hospital Problems         Last Modified POA    * (Principal) Sepsis ([address] 7/21/2021 Yes    Essential hypertension 7/21/2021 Yes    Tobacco use 7/21/2021 Yes    Seizures ([address] 7/21/2021 Yes    Recurrent major depressive disorder ([address] 7/21/2021 Yes    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24, the patient underwent 2 surgical resections without known recurrence 7/21/2021 Yes    AMAN (generalized anxiety disorder) 7/21/2021 Yes    Chronic peripheral neuropathic pain 7/21/2021 Yes    History of alcohol abuse 7/21/2021 Yes    Personal history of astrocytoma 7/21/2021 Yes    Marijuana abuse 7/21/2021 Yes          Plan:     Patient status inpatient in the Progressive Unit/Step down    Sepsis of unknown origin: continue IV fluids and broad spectrum antibiotics (received 30ml/kg bolus in ED), follow urine and blood cultures, also concern for pneumonia with infiltrate on CT chest, will repeat CXR in am and check sputum cultures, routine labs and advised continuous telemetry  Diverticulitis/colitis: no abscess or perforation on imaging, no history of underlying bowel disease, continue IV fluids as above and as needed symptom control  HA: likely exacerbated 2/1, resume home medications, IV fluids as above, no neck pain or focal neuro deficits and no recent falls/injyr to warrant further imaging at this time  Compression Fractures: appears to be subacute worsening of known compression fracture, no change/increased in numbness/tongling, resume home medications/antispasmodics, add on prn norco for now for improved pain control, continue outpatient follow up with pain management  Intra Atrial Lipoma: will check 2D echo  Chronic pain/ neuropathy: follows OP neurology and pain management, continue with carolyn elyrica, requip, baclofen, iron supplementation and folic acid  Seizure Disorder: follows with OP neurology, continue tegretol  HTN: resume amlodipine so long as BP allows  COPD: non oxygen dependent, continue symbicort  Depression/Anxiety: continue buspar and prozac  Routine labs, home medications reviewed, diet as tolerated  DVT/PPI  Full Code    Consultations:   IP CONSULT TO INTERNAL MEDICINE  PHARMACY TO DOSE VANCOMYCIN    Patient is admitted as inpatient status because of co-morbidities listed above, severity of signs and symptoms as outlined, requirement for current medical therapies and most importantly because of direct risk to patient if care not provided in a hospital setting. Expected length of stay > 48 hours.     LOI Carrasco NP  7/21/2021  10:09 PM    Copy sent to Dr. Maria Fernanda Haddad MD

## 2021-07-22 NOTE — CONSULTS
angulation in the mid-esophagus at 25 cm, presumably extraluminal compression, subtle. Cold biopsy taken at this site  2) Large amount of undigested food identified in the stomach suggesting poor gastric motility or non-adherence to dietary instructions      RECOMMENDATIONS:   1) Follow up with referring provider, as previously scheduled. 2) Follow up path in GI clinic. Consider motility testing as outpatient (gastric and esophageal).      Francesca Turcios MD  [facility]     12/2020 EUS w/FNA biopsies per Dr. Joceline Santana due to pancreatic lesion     FINDINGS: The Echoendoscope was inserted into the oropharynx under direct visualization and advanced smoothly into the stomach where a small gastric pool was suctioned. A cursory view of the gastric mucosa was normal. The scope was then further advanced through a patent pylorus to the second portion of the duodenum.      Pancreas: Diffusely heterogenous pancreas seen in the examined pancreas. PD in the head was 4.7 mm mm, in the body 2.7 mm and in the tail 1.9 mm in maximum cross sectional diameter.      A round mass was identified in the pancreatic head. The mass was hypoechoic, heterogenous and solid. The mass measured 37 mm by 30 mm in maximal cross-sectional diameter. The endosonographic borders were well-defined. There was no sonographic evidence to suggest invasion into the portal vein. An intact interface was seen between the mass and the celiac trunk and superior mesenteric artery suggesting a lack of invasion. Fine needle biopsy was performed. Color Doppler imaging was utilized prior to needle puncture to confirm a lack of significant vascular structures within the needle path. Three passes were made with the 25 gauge ultrasound biopsy needle using a transduodenal approach. A stylet and suction was used.  A cytologist was present and performed a preliminary cytologic examination.    Preliminary cytology suspicious changes (final results are pending). Estimated blood loss was minimal. Verification of patient identification for the specimen was done by the physician and nurse using the patient's name and medical record number.     CBD: Normal, no stones, sludge. CBD diameter: 11 mm.     Gallbladder: Possible sludge     Left adrenal: Normal.      Ampulla: Normal.     Left lobe of liver: no pathology      Lymphadenopathy: Two suspicious lymph nodes noted around head of pancreas (9.5 and 8.0 mm)      RECOMMENDATIONS:   1. Follow up on pathology report. 2. Follow up with referring physician      James Jauregui MD [facility]    Biopsies were negative for malignancy-bx indicated chronic pancreatitis    10/2020 Colonoscopy per Dr. Pauline Huynh due to CHARLES-poor prep    IMPRESSION:      FAIR to POOR PREP     1) Large lobulated velvety and nodular mucosal growth extending from the anal verge into the gluteal, perineum, and vagina that grossly appears to suggest condyloma. 2) 7mm flat polyp at 25 cm and 8mm size flat polyp identified at 20 cm (not removed due to poor prep)   3) No signs of active bleeding observed, brown/green stool through out colon      RECOMMENDATIONS:   1) Recommend Colorectal surgery and Gynecology consult to evaluate anorectal and genital lesions. 2) Repeat Colonoscopy and EGD with in 3 months to remove polyps identified.  Patient will need extended bowel prep at that time    3) Return back to medical floor.      Pauline Huynh MD  [facility]   10/22/20  Past Medical/Social/Family History:  Past Medical History:   Diagnosis Date    Acute respiratory failure with hypoxia ([address] 10/16/2020    Alcohol withdrawal syndrome, with delirium ([address] 12/14/2019    Alcoholism ([address]     Anemia 10/2020    GI bleed    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24, the patient underwent 2 surgical resections without known recurrence 10/23/2020    Closed fracture of lateral portion of left tibial plateau 85/65/2229    COPD (chronic obstructive pulmonary disease) ([facility]     CO2 retainer, on Bipap at night for this, Dr. Perez Feeling ( last visit 11/20/2020 and note on chart )    Depression     bipolar, major depressive disorder, ptsd, anxiety    Dysphagia     GI bleed 10/2020    Hypertension     Memory loss     Oxygen dependent     pt stated not needed as of 12/9/2020    Pain, joint, ankle and foot     Pancreatic lesion 10/2020    Dr. Austin Peraza working up pt    Peripheral neuropathy     Seizures ([facility]     also baseline tremors-last sz summer 2020    Tension headache     Under care of team 07/01/2021    neuro-Dr Wan-[facility]-last visit june 2021    Under care of team 07/01/2021    pain management-Hamzah Martines-nery jimenez-last visit june 2021    Under care of team 07/01/2021    pulmonology-Dr Dueñas-[facility]-last virtual visit feb 2021    Under care of team 07/01/2021    psych-bahnfeldt NP-telemed-last visit may 2021    Under care of team 07/01/2021    [address]-last visit june 2021    Wellness examination 07/01/2021    [facility] may 2021     Past Surgical History:   Procedure Laterality Date    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    COLONOSCOPY N/A 10/22/2020    COLONOSCOPY DIAGNOSTIC performed by Hector Castro MD at [address] (LOWER) N/A 12/9/2020    ENDOSCOPIC ULTRASOUND, UPPER WITH LINEAR SCOPE FOR BIOPSY OF MASS ON HEAD OF PANCREAS performed by Be Hoyos MD at [address] Left 7-3-13    ORIF tibial plateau    FRACTURE SURGERY Right     small finger metacarpal fracture    HAND SURGERY      pins    HYSTERECTOMY  2003    UPPER GASTROINTESTINAL ENDOSCOPY N/A 10/22/2020    EGD BIOPSY performed by Hector Castro MD at [address] [place] [address] N/A 4/5/2021    EGD BIOPSY performed by Hector Castro MD at [facility]     Family History   Problem Relation Age of Onset    Other Father     Cancer Maternal Grandmother     Heart Disease Paternal Grandmother     Esophageal Cancer Maternal Aunt      Previous records/history/ and notes were reviewed    Allergies:  No Known Allergies    Home medications:  Prior to Admission medications    Medication Sig Start Date End Date Taking? Authorizing Provider   acamprosate (CAMPRAL) 333 MG tablet Take 2 tablets by mouth 3 times daily 7/16/21 8/15/21  Nadia Bernard, APRN - NP   sertraline (ZOLOFT) 50 MG tablet Take 3 tablets by mouth daily for 7 days, THEN 2 tablets daily for 7 days, THEN 1 tablet daily for 7 days. 7/16/21 8/6/21  Nadia Bernard, APRN - NP   FLUoxetine (PROZAC) 20 MG tablet Take 0.5 tablets by mouth daily for 7 days, THEN 1 tablet daily for 23 days. 7/16/21 8/15/21  Nadia Bernard APRN - NP   traZODone (DESYREL) 50 MG tablet TAKE 1 AND 1/2 TABLET BY MOUTH ONCE NIGHTLY AS NEEDED FOR SLEEP 7/16/21   Nadia Bernard APRN - NP   thermotabs (MEDI-LYTE) TABS tablet Take 1 tablet by mouth daily for 3 days 7/9/21 7/12/21  LOI Prince - NP   carBAMazepine (TEGRETOL XR) 200 MG extended release tablet Take 1 tablet by mouth 2 times daily 7/6/21   Andrei Rosenberg MD   topiramate (TOPAMAX) 25 MG tablet Take 2 tablets by mouth daily 7/6/21   Andrei Rosenberg MD   pregabalin (LYRICA) 200 MG capsule Take 1 capsule by mouth 3 times daily for 90 days.  7/2/21 9/30/21  Andrei Rosenberg MD   KLOR-CON M20 20 MEQ extended release tablet TAKE ONE TABLET BY MOUTH DAILY 6/24/21   Ludivina Cody MD   amLODIPine (NORVASC) 5 MG tablet TAKE ONE TABLET BY MOUTH DAILY 6/3/21   Ludivina Cody MD   busPIRone (BUSPAR) 15 MG tablet Take 1 tablet by mouth 3 times daily 5/5/21   Ludivina Cody MD   baclofen (LIORESAL) 10 MG tablet Take 1 tablet by mouth 3 times daily 5/25/21   Ludivina Cody MD   ibuprofen (ADVIL;MOTRIN) 600 MG tablet Take 1 tablet by mouth 3 times daily as needed for Pain 5/25/21   Ludivina Cody MD   sertraline (ZOLOFT) 100 MG tablet Take 2 tablets by mouth daily 5/17/21   LOI Chang - NP   ferrous sulfate (IRON 325) 325 (65 Fe) MG tablet Take 1 tablet by mouth 2 times daily 4/22/21   Ludivina Mata MD   rOPINIRole (REQUIP) 1 MG tablet Take 1 tablet by mouth nightly 4/1/21   Ludivina Mata MD   budesonide-formoterol [facility]) 160-4.5 MCG/ACT AERO Inhale 2 puffs into the lungs 2 times daily 3/31/21   Ludivina Mata MD   hydrOXYzine (ATARAX) 50 MG tablet TAKE ONE TABLET BY MOUTH THREE TIMES A DAY 3/12/21   Ludivina Mata MD   tiotropium (SPIRIVA RESPIMAT) 2.5 MCG/ACT AERS inhaler Inhale 2 puffs into the lungs daily 2/26/21 7/8/21  Delicia Meyers MD   sodium chloride (ALTAMIST SPRAY) 0.65 % nasal spray 1 spray by Nasal route as needed for Congestion 12/28/20   Ludivina Mata MD   ACETAMINOPHEN EXTRA STRENGTH 500 MG tablet Take 500 mg by mouth 3 times daily as needed 5/8/20   Laisha Newby MD   albuterol sulfate HFA (VENTOLIN HFA) 108 (90 Base) MCG/ACT inhaler Inhale 2 puffs into the lungs 4 times daily as needed for Wheezing 6/11/20   Ludivina Mata MD   vitamin B-1 (THIAMINE) 100 MG tablet Take 1 tablet by mouth daily 7/12/19   Ludivina Mata MD   vitamin D (ERGOCALCIFEROL) 10077 units CAPS capsule Take 1 capsule by mouth once a week 6/19/19   Ludivina Mata MD   folic acid (FOLVITE) 1 MG tablet Take 1 tablet by mouth daily 6/19/19   Ludivina Mata MD     .  Current Medications:  Scheduled Meds:   levalbuterol  1.25 mg Nebulization Once    amLODIPine  5 mg Oral Daily    baclofen  10 mg Oral TID    budesonide-formoterol  2 puff Inhalation BID    busPIRone  15 mg Oral TID    carBAMazepine  200 mg Oral BID    ferrous sulfate  325 mg Oral BID    FLUoxetine  10 mg Oral Daily    folic acid  1 mg Oral Daily    hydrOXYzine  25 mg Oral Q8H    potassium chloride  20 mEq Oral Daily with breakfast    pregabalin  200 mg Oral TID    rOPINIRole  1 mg Oral Nightly    sodium chloride flush  5-40 mL Intravenous 2 times per day    enoxaparin  40 mg Subcutaneous Daily    piperacillin-tazobactam  3,375 mg Intravenous Q8H    vancomycin (VANCOCIN) intermittent dosing (placeholder)   Other RX Placeholder    vancomycin  1,000 mg Intravenous Q12H    polyethylene glycol  17 g Oral Daily     . Continuous Infusions:   [Held by provider] sodium chloride Stopped (21 0446)    sodium chloride       . PRN Meds:fentanNYL, traZODone, sodium chloride flush, sodium chloride, acetaminophen **OR** acetaminophen, oxyCODONE-acetaminophen **OR** oxyCODONE-acetaminophen, melatonin    REVIEW OF SYSTEMS:     Constitutional: No fever, no chills, no lethargy, no weakness, no weight loss  HEENT:  No headache, otalgia, itchy eyes, nasal discharge or sore throat. Cardiac:  No chest pain, dyspnea, orthopnea or PND. Chest:   No cough, phlegm or wheezing. Abdomen:  Severe abdominal pain radiating into her back  Neuro:  No focal weakness, abnormal movements or seizure like activity. Skin:   No rashes, no itching. :   No hematuria, no pyuria, no dysuria, no flank pain. Extremities:  No swelling or joint pains. ROS was otherwise negative except as mentioned in the [address] PHYSICAL EXAM:    /80   Pulse 110   Temp 98.3 °F (36.8 °C) (Oral)   Resp 19   Ht 5' 5\" (1.651 m)   Wt 140 lb (63.5 kg)   SpO2 94%   BMI 23.30 kg/m²   . TMAX[24]    General: Well developed, Well nourished, No apparent distress  Head:  Normocephalic, Atraumatic  EENT: EOMI, Sclera not icteric, Oropharynx moist  Neck:  Supple, Trachea midline  Lungs:CTA Bilaterally  Heart: RRR, No murmur, No rub, No gallop, PMI nondisplaced. Abdomen:Soft, extremely tender, Not distended, BS WNL,  No masses. No hepatomegalia   Ext:No clubbing. No cyanosis. No edema. Skin: No rashes. No jaundice. No stigmata of liver disease.     Neuro:  A&O x Three, No focal neurological deficits    Imagin2021 CT A/P with contrast FINDINGS:       Chest:       Mediastinum: Calcific coronary artery disease.  3.5 cm right atrial lipoma. Heart and great vessels are otherwise unremarkable.  No pulmonary embolism or   aortic aneurysm.  No pathologic mediastinal or hilar lymphadenopathy.       Lungs/pleura: Ground-glass infiltrate noted within the lingula.  Stable   loculated small pneumatocele and new cystic bronchiectasis noted on the right.       Soft Tissues/Bones: Multiple compression fractures noted in the thoracic   spine.  This demonstrates slight interval progression at T7 and T10.  Old   sternal fracture.           Abdomen/Pelvis:       Organs:       The abdominal wall appears normal.       The liver, spleen,, and adrenals appear normal.  Multiple coarse   calcifications noted within the pancreas.  Gallbladder normal.       Punctate nonobstructing nephroliths on the left.  Normal right kidney.       The bladder appears normal.       GI/Bowel: The stomach,small bowel, and colon appear normal. Appendix normal.   Inflammatory changes pericolonic fat on the left and isolated diverticula   noted.       Pelvis: Normal       Peritoneum/Retroperitoneum: The abdominal aorta and iliac arteries are normal   in caliber. There is no pathologic adenopathy.       Bones/Soft Tissues: New compression fracture L5.           Impression   Probable acute diverticulitis or colitis left colon.  No evidence of   perforation or abscess at this time.       Chronic pancreatitis.       Multiple compression fractures some of which are new since the previous exam.       Lingular ground-glass infiltrate.  Recommend follow-up to resolution.       Intra-atrial lipoma right atrium.  Cardiac echo may be helpful. Hemotological labs: Anemia studies:  No results for input(s): LABIRON, TIBC, FERRITIN, [id number], FOLATE, OCCULTBLD in the last 72 hours.     CBC:  Recent Labs     07/21/21  1600 07/22/21  0322   WBC 21.5* 22.0*   HGB 14.9 13.0   MCV 87.2 92.6   RDW 13.6 13.9   * 372       PT/INR:  Recent Labs     07/22/21  0322   PROTIME 9.6   INR 0.9       BMP:  Recent Labs     07/21/21  1551 07/21/21  1600 07/22/21  0322   NA  --  134* 136   K  --  4.6 4.0   CL  --  94* 101   CO2  --  25 21   BUN  --  15 14   CREATININE 0.54 0.41* 0.29*   GLUCOSE  --  128* 104*   CALCIUM  --  10.3 9.1       Liver work up:  Hepatitis Functional Panel:  Recent Labs     07/22/21  0322   ALKPHOS 226*   ALT 9   AST 23   PROT 6.8   BILITOT 0.46   LABALBU 3.0*       Amylase/Lipase/Ammonia:  Recent Labs     07/22/21  0455   AMYLASE 78   LIPASE 67*       Acute Hepatitis Panel:  Lab Results   Component Value Date    HEPBSAG NONREACTIVE 07/14/2019    HEPCAB NONREACTIVE 07/14/2019    HEPBIGM NONREACTIVE 07/14/2019    HEPAIGM NONREACTIVE 07/14/2019            Principal Problem:    Sepsis ([address]  Active Problems:    Essential hypertension    Tobacco use    Seizures (HCC)    Recurrent major depressive disorder (HCC)    Astrocytoma (HCC) - 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24, the patient underwent 2 surgical resections without known recurrence    AMAN (generalized anxiety disorder)    Chronic peripheral neuropathic pain    History of alcohol abuse    Personal history of astrocytoma    Marijuana abuse  Resolved Problems:    * No resolved hospital problems. *       GI Impression and recommendations and plan:    79-year-old with significant abdominal pain with associated bloating and constipation found to have diverticulitis on CT-no abscess or perforation noted  Significant PMH occludes alcohol misuse, chronic pancreatitis, gastroparesis.        Continue IV antibiotics-patient is currently on Zosyn 3.375 mg IV every 8 hours  Full liquid diet-once patient's pain improves may advance to low residual soft diet  Once pain is under control patient can be discharged home on Cipro 500 mg p.o. twice daily for total of 10 days, Flagyl 500 mg p.o. 3 times daily for total of 10 days  Avoid narcotics as this may worsen her gastroparesis  Dulcolax 5 mg p.o. as needed constipation  MiraLAX 17 g packet p.o. 3 times daily as needed constipation  Ensure good glycemic control  Please offer alternative pain management such as heating pad etc.  Patient will need to follow-up in the office 1 to 2 weeks post discharge for general follow-up and to be scheduled for repeat colonoscopy 6 to 8 weeks after resolution of current diverticulitis due to poor prep on colonoscopy in 2020  We will follow      This plan was formulated in collaboration with Dr. Christopher Jacob MD      Electronically signed by:  LOI Ruiz - [address] Gastroenterology  [phone]  7/22/2021    9:15 AM     This note was created with the assistance of a speech-recognition program.  Although the intention is to generate a document that actually reflects the content of the visit, no guarantees can be provided that every mistake has been identified and corrected by editing.

## 2021-07-22 NOTE — PROGRESS NOTES
Ms. Moi Mason is a 62-year-old pleasant female with history of chronic pancreatitis, gastroparesis, and diverticulosis who presents with progressively worsening left-sided abdominal pain that started about 2 to 3 days ago. Patient states that about 2 days ago she started experience severe sharp abdominal pain that came in waves 9 out of 10 at its worse associated with bloating and constipation. Patient took some MiraLAX and had  a bowel movement and felt some partial relief however she continued to have severe worsening abdominal pain radiating to her back. That brought her into the hospital.  Patient states that she has never had these kind of symptoms before. Denies any nausea, vomiting, fever, chills. She did have chills. And sweating. She denies eating his nuts or seeds. She smokes a pack a day, for the past more than 30 years. She is a recovering al coholic. She denies any NSAID use. Denies any bloody stools or black stools. She does take iron so her stools are usually dark however for the past few days she has experienced constipation. CT abdomen pelvis showed left colonic diverticulosis with diverticulitis. No evidence of abscess or perforation. There is signs of chronic pancreatitis. Continue IV antibiotics  Full liquid diet until patient's pain is relieved  then advance to soft low residue diet. Once patient's pain is relieved and she is tolerating diet she can be discharged home. Avoid narcotics as this may worsen her gastroparesis.

## 2021-07-22 NOTE — CONSULTS
Department of Neurosurgery                                            Nurse Practitioner Consult Note      Reason for Consult:  Chronic thoracic fractures, new T5 fracture   Requesting Physician:  Nicole Akers  Neurosurgeon:   [] Dr. Denita Wolfe  [] Dr. Soo Westbrook  [x] Dr. Bina Bill  [] Dr. Maco Lopez      History Obtained From:  patient    CHIEF COMPLAINT:         Chief Complaint   Patient presents with    Back Pain     states chronic back pain becoming worse.  has recently started physical therapy       HISTORY OF PRESENT ILLNESS:       The patient is a 45 y.o. female who presented to the ED one day ago with sudden onset of mid-back pain starting 2 days prior. Since onset, pain has been constant. She denies any trauma to her back or any falls. She denies shoot pain into arms and legs. She denies numbness and weakness in her arms and legs.      PAST MEDICAL HISTORY :       Past Medical History:        Diagnosis Date    Acute respiratory failure with hypoxia ([address] 10/16/2020    Alcohol withdrawal syndrome, with delirium ([address] 12/14/2019    Alcoholism ([address]     Anemia 10/2020    GI bleed    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24, the patient underwent 2 surgical resections without known recurrence 10/23/2020    Closed fracture of lateral portion of left tibial plateau 04/56/8146    COPD (chronic obstructive pulmonary disease) ([address]     CO2 retainer, on Bipap at night for this, Dr. Bennie Ingram ( last visit 11/20/2020 and note on chart )    Depression     bipolar, major depressive disorder, ptsd, anxiety    Dysphagia     GI bleed 10/2020    Hypertension     Memory loss     Oxygen dependent     pt stated not needed as of 12/9/2020    Pain, joint, ankle and foot     Pancreatic lesion 10/2020    Dr. Valerie Ruano working up pt    Peripheral neuropathy     Seizures ([address]     also baseline tremors-last sz summer 2020    Tension headache     Under care of team 07/01/2021    neuro-Dr Wan[facility]-last visit june 2021  Under care of team 07/01/2021    pain management-Hamzah Martines-nery jimenez-last visit june 2021    Under care of team 07/01/2021    pulmonology-Dr Dueñas-[facility]-last virtual visit feb 2021    Under care of team 07/01/2021    psych-bahnfeldt NP-telemed-last visit may 2021    Under care of team 07/01/2021    dk[address]-last visit june 2021    Wellness examination 07/01/2021    pcp-Ludivina Grimm-[address]-last visit may 2021       Past Surgical History:        Procedure Laterality Date    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    COLONOSCOPY N/A 10/22/2020    COLONOSCOPY DIAGNOSTIC performed by Anastasia Morris MD at [address] (LOWER) N/A 12/9/2020    ENDOSCOPIC ULTRASOUND, UPPER WITH LINEAR SCOPE FOR BIOPSY OF MASS ON HEAD OF PANCREAS performed by Ángel Patel MD at [address] Left 7-3-13    ORIF tibial plateau    FRACTURE SURGERY Right     small finger metacarpal fracture    HAND SURGERY      pins    HYSTERECTOMY  2003    UPPER GASTROINTESTINAL ENDOSCOPY N/A 10/22/2020    EGD BIOPSY performed by Anastasia Morris MD at [address] [place] [address] N/A 4/5/2021    EGD BIOPSY performed by Anastasia Morris MD at [facility]       Social History:   Social History     Socioeconomic History    Marital status: Single     Spouse name: Not on file    Number of children: Not on file    Years of education: Not on file    Highest education level: Not on file   Occupational History    Not on file   Tobacco Use    Smoking status: Current Every Day Smoker     Packs/day: 0.50     Years: 30.00     Pack years: 15.00     Types: Cigarettes    Smokeless tobacco: Never Used    Tobacco comment: plans on quitting in the future    Vaping Use    Vaping Use: Never used   Substance and Sexual Activity    Alcohol use: Not Currently     Alcohol/week: 0.0 standard drinks    Drug use: Yes     Frequency: 2.0 times per week     Types: Marijuana    Sexual activity: Not on file   Other Topics Concern    Not on file   Social History Narrative    Not on file     Social Determinants of Health     Financial Resource Strain: Medium Risk    Difficulty of Paying Living Expenses: Somewhat hard   Food Insecurity: No Food Insecurity    Worried About Running Out of Food in the Last Year: Never true    Tyler of Food in the Last Year: Never true   Transportation Needs:     Lack of Transportation (Medical):  Lack of Transportation (Non-Medical):    Physical Activity:     Days of Exercise per Week:     Minutes of Exercise per Session:    Stress:     Feeling of Stress :    Social Connections:     Frequency of Communication with Friends and Family:     Frequency of Social Gatherings with Friends and Family:     Attends Advent Services:     Active Member of Clubs or Organizations:     Attends Club or Organization Meetings:     Marital Status:    Intimate Partner Violence:     Fear of Current or Ex-Partner:     Emotionally Abused:     Physically Abused:     Sexually Abused:        Family History:       Problem Relation Age of Onset    Other Father     Cancer Maternal Grandmother     Heart Disease Paternal Grandmother     Esophageal Cancer Maternal Aunt        Allergies:  Patient has no known allergies. Home Medications:  Prior to Admission medications    Medication Sig Start Date End Date Taking? Authorizing Provider   acamprosate (CAMPRAL) 333 MG tablet Take 2 tablets by mouth 3 times daily 7/16/21 8/15/21  Bedoya Cost, APRN - NP   sertraline (ZOLOFT) 50 MG tablet Take 3 tablets by mouth daily for 7 days, THEN 2 tablets daily for 7 days, THEN 1 tablet daily for 7 days. 7/16/21 8/6/21  Bedoya Cost, APRN - NP   FLUoxetine (PROZAC) 20 MG tablet Take 0.5 tablets by mouth daily for 7 days, THEN 1 tablet daily for 23 days.  7/16/21 8/15/21  Bedoya Cost, APRN - NP   traZODone (DESYREL) 50 MG tablet TAKE 1 AND 1/2 TABLET BY MOUTH ONCE NIGHTLY AS NEEDED FOR SLEEP 7/16/21   LOI Tamez NP   thermotabs (MEDI-LYTE) TABS tablet Take 1 tablet by mouth daily for 3 days 7/9/21 7/12/21  LOI Banks Chi, NP   carBAMazepine (TEGRETOL XR) 200 MG extended release tablet Take 1 tablet by mouth 2 times daily 7/6/21   Piedad Burden MD   topiramate (TOPAMAX) 25 MG tablet Take 2 tablets by mouth daily 7/6/21   Piedad Burden MD   pregabalin (LYRICA) 200 MG capsule Take 1 capsule by mouth 3 times daily for 90 days.  7/2/21 9/30/21  Piedad Burden MD   KLOR-CON M20 20 MEQ extended release tablet TAKE ONE TABLET BY MOUTH DAILY 6/24/21   Arti Elysa Kussmaul, MD   amLODIPine (NORVASC) 5 MG tablet TAKE ONE TABLET BY MOUTH DAILY 6/3/21   Arti Elysa Kussmaul, MD   busPIRone (BUSPAR) 15 MG tablet Take 1 tablet by mouth 3 times daily 5/5/21   Arti Elysa Kussmaul, MD   baclofen (LIORESAL) 10 MG tablet Take 1 tablet by mouth 3 times daily 5/25/21   Arti Elysa Kussmaul, MD   ibuprofen (ADVIL;MOTRIN) 600 MG tablet Take 1 tablet by mouth 3 times daily as needed for Pain 5/25/21   Arti Elysa Kussmaul, MD   sertraline (ZOLOFT) 100 MG tablet Take 2 tablets by mouth daily 5/17/21   LOI Tamez NP   ferrous sulfate (IRON 325) 325 (65 Fe) MG tablet Take 1 tablet by mouth 2 times daily 4/22/21   Arti Elysa Kussmaul, MD   rOPINIRole (REQUIP) 1 MG tablet Take 1 tablet by mouth nightly 4/1/21   Arti Elysa Kussmaul, MD   budesonide-formoterol [facility]) 160-4.5 MCG/ACT AERO Inhale 2 puffs into the lungs 2 times daily 3/31/21   Arti Elysa Kussmaul, MD   hydrOXYzine (ATARAX) 50 MG tablet TAKE ONE TABLET BY MOUTH THREE TIMES A DAY 3/12/21   Arti Elysa Kussmaul, MD   tiotropium (SPIRIVA RESPIMAT) 2.5 MCG/ACT AERS inhaler Inhale 2 puffs into the lungs daily 2/26/21 7/8/21  Shawn Mandujano MD   sodium chloride (ALTAMIST SPRAY) 0.65 % nasal spray 1 spray by Nasal route as needed for Congestion 12/28/20   Arti Elysa Kussmaul, MD   ACETAMINOPHEN Ericka Andres mL, 5-40 mL, Intravenous, 2 times per day  sodium chloride flush 0.9 % injection 5-40 mL, 5-40 mL, Intravenous, PRN  0.9 % sodium chloride infusion, 25 mL, Intravenous, PRN  enoxaparin (LOVENOX) injection 40 mg, 40 mg, Subcutaneous, Daily  acetaminophen (TYLENOL) tablet 650 mg, 650 mg, Oral, Q6H PRN **OR** acetaminophen (TYLENOL) suppository 650 mg, 650 mg, Rectal, Q6H PRN  piperacillin-tazobactam (ZOSYN) 3,375 mg in dextrose 5 % 50 mL IVPB extended infusion (mini-bag), 3,375 mg, Intravenous, Q8H  vancomycin (VANCOCIN) intermittent dosing (placeholder), , Other, RX Placeholder  vancomycin (VANCOCIN) 1000 mg in dextrose 5% 200 mL IVPB, 1,000 mg, Intravenous, Q12H  oxyCODONE-acetaminophen (PERCOCET) 5-325 MG per tablet 1 tablet, 1 tablet, Oral, Q4H PRN **OR** oxyCODONE-acetaminophen (PERCOCET) 5-325 MG per tablet 2 tablet, 2 tablet, Oral, Q4H PRN  melatonin tablet 3 mg, 3 mg, Oral, Nightly PRN  polyethylene glycol (GLYCOLAX) packet 17 g, 17 g, Oral, Daily    REVIEW OF SYSTEMS:       CONSTITUTIONAL: negative for fatigue and malaise   EYES: negative for double vision and photophobia    HEENT: negative for tinnitus and sore throat   RESPIRATORY: negative for cough, shortness of breath   CARDIOVASCULAR: negative for chest pain, palpitations   GASTROINTESTINAL: negative for nausea, vomiting   GENITOURINARY: negative for incontinence   MUSCULOSKELETAL: negative for neck pain, positive for back pain   NEUROLOGICAL: negative for seizures   PSYCHIATRIC: negative for agitated     Review of systems otherwise negative.     PHYSICAL EXAM:       /86   Pulse 107   Temp 99.2 °F (37.3 °C) (Oral)   Resp 20   Ht 5' 5\" (1.651 m)   Wt 140 lb (63.5 kg)   SpO2 95%   BMI 23.30 kg/m²       CONSTITUTIONAL: no apparent distress, appears stated age   HEAD: normocephalic, atraumatic   ENT: moist mucous membranes, uvula midline   NECK: supple, symmetric, no midline tenderness to palpation   BACK: mild tenderness over thoracic spine, no step-offs or deformities   NEUROLOGIC:  Mental Status:  Awake, alert, NAD            Motor Exam:    Motor exam is symmetrical 5 out of 5 all extremities bilaterally    Sensory:    Right Upper Extremity:  normal  Left Upper Extremity:  normal  Right Lower Extremity:  normal  Left Lower Extremity:  normal         LABS AND IMAGING:     CBC with Differential:    Lab Results   Component Value Date    WBC 22.0 07/22/2021    RBC 4.44 07/22/2021    RBC 4.16 04/30/2012    HGB 13.0 07/22/2021    HCT 41.1 07/22/2021     07/22/2021     04/30/2012    MCV 92.6 07/22/2021    MCH 29.3 07/22/2021    MCHC 31.6 07/22/2021    RDW 13.9 07/22/2021    LYMPHOPCT 5 07/21/2021    MONOPCT 4 07/21/2021    BASOPCT 1 07/21/2021    MONOSABS 0.86 07/21/2021    LYMPHSABS 1.08 07/21/2021    EOSABS 0.00 07/21/2021    BASOSABS 0.22 07/21/2021    DIFFTYPE NOT REPORTED 07/21/2021     BMP:    Lab Results   Component Value Date     07/22/2021    K 4.0 07/22/2021     07/22/2021    CO2 21 07/22/2021    BUN 14 07/22/2021    LABALBU 3.0 07/22/2021    CREATININE 0.29 07/22/2021    CALCIUM 9.1 07/22/2021    GFRAA >60 07/22/2021    LABGLOM >60 07/22/2021    GLUCOSE 104 07/22/2021    GLUCOSE 92 04/30/2012       Radiology Review:  XR ABDOMEN (KUB) (SINGLE AP VIEW)    Result Date: 7/22/2021  EXAMINATION: ONE SUPINE XRAY VIEW(S) OF THE ABDOMEN 7/22/2021 12:37 pm COMPARISON: Chest, abdomen, and pelvis CT 07/21/2021; abdomen radiograph 07/14/2019 HISTORY: ORDERING SYSTEM PROVIDED HISTORY: abdominal distension/ constipation TECHNOLOGIST PROVIDED HISTORY: abdominal distension/ constipation Reason for Exam: abdominal distension/constipation Acuity: Unknown Type of Exam: Unknown FINDINGS: Overlying heart monitor leads and tubing. Exclusion of the diaphragm from the field of view. Patchy small bowel gas with no abnormally dilated small bowel loops. Mild dilation of the ascending and transverse colon. Patchy minimal to mild stool.   Suspected ingested medication projecting over the left upper quadrant. Suboptimally seen pancreatic calcifications consistent with chronic pancreatitis. Venous phleboliths in the pelvis bilaterally. Diffuse bony demineralization. No obvious acute fracture. Mild dilation of the right hemicolon and patchy small bowel gas most likely due to ileus. XR CHEST PORTABLE    Result Date: 7/22/2021  EXAMINATION: ONE XRAY VIEW OF THE CHEST 7/22/2021 5:52 am COMPARISON: 17 October 2020 HISTORY: ORDERING SYSTEM PROVIDED HISTORY: PNA TECHNOLOGIST PROVIDED HISTORY: PNA Reason for Exam: port upright FINDINGS: AP portable view is electronically marked regarding sides. No infiltrates, effusions or pneumothoraces. Cardiomediastinal silhouette is within normal limits. The remaining visualized osseous and soft tissues are unchanged. No radiologic evidence of acute cardiopulmonary disease. CT CHEST ABDOMEN PELVIS W CONTRAST    Result Date: 7/21/2021  EXAMINATION: CT OF THE CHEST, ABDOMEN, AND PELVIS WITH CONTRAST 7/21/2021 4:58 pm TECHNIQUE: CT of the chest, abdomen and pelvis was performed with the administration of intravenous contrast. Multiplanar reformatted images are provided for review. Dose modulation, iterative reconstruction, and/or weight based adjustment of the mA/kV was utilized to reduce the radiation dose to as low as reasonably achievable. COMPARISON: February 12, 2021 CT abdomen and May 24, 2021 CT chest HISTORY: ORDERING SYSTEM PROVIDED HISTORY: sepsis, back pain TECHNOLOGIST PROVIDED HISTORY: sepsis, back pain Decision Support Exception - unselect if not a suspected or confirmed emergency medical condition->Emergency Medical Condition (MA) Reason for Exam: back pain, sepsis Acuity: Unknown Type of Exam: Unknown FINDINGS: Chest: Mediastinum: Calcific coronary artery disease. 3.5 cm right atrial lipoma. Heart and great vessels are otherwise unremarkable. No pulmonary embolism or aortic aneurysm.   No pathologic mediastinal or hilar lymphadenopathy. Lungs/pleura: Ground-glass infiltrate noted within the lingula. Stable loculated small pneumatocele and new cystic bronchiectasis noted on the right. Soft Tissues/Bones: Multiple compression fractures noted in the thoracic spine. This demonstrates slight interval progression at T7 and T10. Old sternal fracture. Abdomen/Pelvis: Organs: The abdominal wall appears normal. The liver, spleen,, and adrenals appear normal.  Multiple coarse calcifications noted within the pancreas. Gallbladder normal. Punctate nonobstructing nephroliths on the left. Normal right kidney. The bladder appears normal. GI/Bowel: The stomach,small bowel, and colon appear normal. Appendix normal. Inflammatory changes pericolonic fat on the left and isolated diverticula noted. Pelvis: Normal Peritoneum/Retroperitoneum: The abdominal aorta and iliac arteries are normal in caliber. There is no pathologic adenopathy. Bones/Soft Tissues: New compression fracture L5. Probable acute diverticulitis or colitis left colon. No evidence of perforation or abscess at this time. Chronic pancreatitis. Multiple compression fractures some of which are new since the previous exam. Lingular ground-glass infiltrate. Recommend follow-up to resolution. Intra-atrial lipoma right atrium. Cardiac echo may be helpful. CT LUMBAR SPINE TRAUMA RECONSTRUCTION    Result Date: 7/21/2021  EXAMINATION: CT OF THE THORACIC SPINE WITHOUT CONTRAST; CT OF THE LUMBAR SPINE WITHOUT CONTRAST  7/21/2021 TECHNIQUE: CT of the thoracic spine was performed without the administration of intravenous contrast. Multiplanar reformatted images are provided for review.  Dose modulation, iterative reconstruction, and/or weight based adjustment of the mA/kV was utilized to reduce the radiation dose to as low as reasonably achievable.; CT of the lumbar spine was performed without the administration of intravenous contrast. Multiplanar No significant retropulsion of the dorsal aspect of the vertebra toward the spinal canal is noted. DEGENERATIVE CHANGES: Diffuse mild degenerative changes. Multilevel mild disc protrusions are noted. Bony neural foramina are patent. No exiting nerve root impingement is noted. SOFT TISSUES/RETROPERITONEUM: No paraspinal mass is demonstrated. Scattered calcified plaque in the abdominal aorta is noted. Portions of a calcified retroperitoneal preaortic mass are visualized on some of the images. This is likely thin pancreas consistent with pancreatitis. However, areas incompletely included. CT thoracic spine: 1. Decrease in height in T6 compared to prior study compatible with worsening compression with subacute etiology and mild protrusion of the dorsal aspect of T6. Narrowed T6-T7 neural foramina. 2.  Chronic superior height T8, T9, and T11 without interval change from prior study. 3.  Mild prevertebral soft tissue prominence T6 without evidence of abscess formation. CT lumbar spine: 1. No traumatic malalignment. 2.  Compression fracture superior L5 with subacute appearance. 3.  Calcifications in the abdomen incompletely included appearance suggesting pancreatic calcifications. RECOMMENDATIONS: Please reference CT chest, abdomen and pelvis of same day. CT THORACIC SPINE TRAUMA RECONSTRUCTION    Result Date: 7/21/2021  EXAMINATION: CT OF THE THORACIC SPINE WITHOUT CONTRAST; CT OF THE LUMBAR SPINE WITHOUT CONTRAST  7/21/2021 TECHNIQUE: CT of the thoracic spine was performed without the administration of intravenous contrast. Multiplanar reformatted images are provided for review. Dose modulation, iterative reconstruction, and/or weight based adjustment of the mA/kV was utilized to reduce the radiation dose to as low as reasonably achievable.; CT of the lumbar spine was performed without the administration of intravenous contrast. Multiplanar reformatted images are provided for review.  Dose modulation, iterative reconstruction, and/or weight based adjustment of the mA/kV was utilized to reduce the radiation dose to as low as reasonably achievable. COMPARISON: CT chest of 21 May 2021; CT abdomen 12 February 2021. HISTORY: ORDERING SYSTEM PROVIDED HISTORY: sepsis, back pain TECHNOLOGIST PROVIDED HISTORY: sepsis, back pain Is the patient pregnant?->No Reason for Exam: back pain, sepsis Acuity: Unknown Type of Exam: Unknown; ORDERING SYSTEM PROVIDED HISTORY: BACK PAIN TECHNOLOGIST PROVIDED HISTORY: sepsis, back pain Is the patient pregnant?->No Reason for Exam: back pain, sepsis Acuity: Unknown Type of Exam: Unknown FINDINGS: CT thoracic spine without: BONES/ALIGNMENT: Osteopenia complicates assessment. Normal spine alignment is noted. There has been worsening loss in height in the T6 vertebral body which demonstrated a compression fracture which with subacute on prior examination. Minimal bulging of the dorsal aspect of the vertebral body is noted. A Schmorl's node superior aspect of T8 is noted with minimal superior loss in height, nonacute. Schmorl's node is present superior aspect T9. There is no change in a chronic superior endplate compression Z14. DEGENERATIVE CHANGES: Diffuse mild degenerative and degenerative disc changes are noted. No gross bony canal stenosis. Narrowing of the bilateral T6-T7 neural foramina is noted. Other neural foramina are patent. SOFT TISSUES: Mild soft tissue swelling ventral to the T6 vertebral body is noted. No bony destructive process or abscess formation is noted. Included lung bases are unremarkable. CT lumbar spine without: BONES/ALIGNMENT: Normal spine alignment is noted. Superior endplate compression L5 is noted, new since the February study. The degree of depression is 20-25%. Findings suggest subacute etiology with portions of fracture line visible but with increased sclerosis indicative of healing.   No significant retropulsion of the dorsal aspect of the vertebra toward the spinal canal is noted. DEGENERATIVE CHANGES: Diffuse mild degenerative changes. Multilevel mild disc protrusions are noted. Bony neural foramina are patent. No exiting nerve root impingement is noted. SOFT TISSUES/RETROPERITONEUM: No paraspinal mass is demonstrated. Scattered calcified plaque in the abdominal aorta is noted. Portions of a calcified retroperitoneal preaortic mass are visualized on some of the images. This is likely thin pancreas consistent with pancreatitis. However, areas incompletely included. CT thoracic spine: 1. Decrease in height in T6 compared to prior study compatible with worsening compression with subacute etiology and mild protrusion of the dorsal aspect of T6. Narrowed T6-T7 neural foramina. 2.  Chronic superior height T8, T9, and T11 without interval change from prior study. 3.  Mild prevertebral soft tissue prominence T6 without evidence of abscess formation. CT lumbar spine: 1. No traumatic malalignment. 2.  Compression fracture superior L5 with subacute appearance. 3.  Calcifications in the abdomen incompletely included appearance suggesting pancreatic calcifications. RECOMMENDATIONS: Please reference CT chest, abdomen and pelvis of same day.          ASSESSMENT AND PLAN:       Patient Active Problem List   Diagnosis    Acute bronchitis    Reflex sympathetic dystrophy of lower limb    Essential hypertension    Nicotine addiction    Psychophysiological insomnia    Anxiety    Alcoholic peripheral neuropathy (HCC)    Hypokalemia    Macrocytic anemia    Hypomagnesemia    Tobacco use    Ambulatory dysfunction    Seizures ([address]    COPD with acute exacerbation (HCC)    Paresthesia of lower extremity    Acute respiratory failure with hypoxia (HCC)    Pancreatic cyst    Recurrent major depressive disorder (HCC)    Elevated CA 19-9 level    Perineal mass, female    Esophagitis candidal     Condyloma    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17, the patient underwent 2 surgical resections without known recurrence    Alcohol use disorder, severe, in early remission ([address]    Acute metabolic encephalopathy    AMAN (generalized anxiety disorder)    Major depressive disorder, recurrent severe without psychotic features ([address]    Esophageal dysphagia    Chronic peripheral neuropathic pain    History of alcohol abuse    History of petit-mal seizures    Intractable episodic tension-type headache    Personal history of astrocytoma    Multilevel spine pain    Chronic pain syndrome    Marijuana abuse    Sepsis ([address]    Closed fracture of fifth thoracic vertebra ([address]    Diverticulitis of large intestine without perforation or abscess without bleeding    Elevated brain natriuretic peptide (BNP) level    Elevated alkaline phosphatase level    Chronic pancreatitis (HCC)    Erythema ab igne         A/P:  This is a 45 y.o. female with T6 and L5 compression fractures    Patient care discussed with attending, will reevaluated patient along with attending.      - No neurosurgical interventions planned for now  - Obtain MRI thoracic and lumbar   - Will follow with further recs on review of MRI thoracic and lumbar      Please contact neurosurgery with any changes in patients neurologic status. Thank you for your consult.        Shelia Marie pager [phone]  7/22/2021  3:26 PM

## 2021-07-22 NOTE — CARE COORDINATION
Case Management Initial Discharge Plan  Abran Yeboah,             Met with:patient to discuss discharge plans sleepy but arouseable . Information verified: address, contacts, phone number, , insurance Yes  Insurance Provider: [employer]     Emergency Contact/Next of Kin name & number: Cayla Flores  Who are involved in patient's support system? Parent     PCP: Rupal Zepeda MD  Date of last visit:       Discharge Planning    Living Arrangements:  Alone     Home has 1 stories      Patient able to perform ADL's:Independent    Current Services (outpatient & in home) none  DME equipment: none      Is patient receiving oral anticoagulation therapy? No          Potential Assistance Needed:  F/u pcp        Prior SNF/Rehab Placement and Facility: [facility] 10/2020       Evaluation: no    Expected Discharge date:  21    Patient expects to be discharged to:   home    If home: is the family and/or caregiver wiling & able to provide support at home? Yes   Who will be providing this support? Mom, sister, friends      Transportation provider: mom  Transportation arrangements needed for discharge: No    Readmission Risk              Risk of Unplanned Readmission:  21             Does patient have a readmission risk score greater than 14?: Yes  If yes, follow-up appointment must be made within 7 days of discharge.      Goals of Care: safe transition plan       Educated yes  on transitional options, provided freedom of choice and are agreeable with plan      Discharge Plan: await pt/ot to determine if safe to return home           Electronically signed by Alonso Arboleda RN on 21 at 10:36 AM EDT

## 2021-07-22 NOTE — PROGRESS NOTES
[facility]  Occupational Therapy Not Seen Note    DATE: 2021    NAME: Steve Gramajo  MRN: [id number]   : 1970      Patient not seen this date for Occupational Therapy due to:     Other: Await MRI and NS recs for T6 compression    Next Scheduled Treatment: Check     Electronically signed by Marychuy Hardwick S/OT on 2021 at 2:10 PM

## 2021-07-22 NOTE — PROGRESS NOTES
Pharmacy Note  Vancomycin Consult    Crisoforo Snellen is a 45 y.o. female started on Vancomycin for suspected sepsis; consult received from Dr. Joe Flores to manage therapy. Also receiving the following antibiotics: Zosyn. 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24, the patient underwent 2 surgical resections without known recurrence    Alcohol use disorder, severe, in early remission ([facility]    Acute metabolic encephalopathy    AMAN (generalized anxiety disorder)    Major depressive disorder, recurrent severe without psychotic features ([facility]    Esophageal dysphagia    Chronic peripheral neuropathic pain    History of alcohol abuse    History of petit-mal seizures    Intractable episodic tension-type headache    Personal history of astrocytoma    Multilevel spine pain    Chronic pain syndrome    Marijuana abuse    Sepsis ([facility]     Allergies:  Patient has no known allergies.      Temp max: 96.6    Recent Labs     07/21/21  1551 07/21/21  1600   BUN  --  15   CREATININE 0.54 0.41*   WBC  --  21.5*       Intake/Output Summary (Last 24 hours) at 7/21/2021 2023  Last data filed at 7/21/2021 2003  Gross per 24 hour   Intake 2050 ml   Output --   Net 2050 ml     Culture Date      Source                       Results  See micro    Ht Readings from Last 1 Encounters:   07/21/21 5' 5\" (1.651 m)        Wt Readings from Last 1 Encounters:   07/21/21 140 lb (63.5 kg)       Body mass index is 23.3

## 2021-07-22 NOTE — PROCEDURES
NUTRITION ASSESSMENT     Assessment Type: Follow Up   Reason for Visit: Registered Dietitian Evaluation   Chart, Medications and Lab Results Reviewed: yes   Nutrition Risk Factors: Parenteral Nutrition   Food Allergies: no     Demographic/Anthropometrics Information   Gender: male   Birth Gestational Age: 38 5/7  Day of Life: 13 days  Corrected Gestational Age: 40 4/7     Birth Weight: 3060 g  Birth HC: 33.0 cm  Birth Length: 52.0 cm    %tile: 40  %tile: 7  %tile: 81    Z-score: -0.25  Z-score: -1.49  Z-score: 0.89    Goal: 0.67 kg/mo (22.3 g/day) Goal: 1.36 cm/mo  Goal: 2.49 cm/mo      Growth Curve Used: WHO     Current Weight: 4159 g Current HC: 32.5 cm Current Length: 56.0 cm   %tile: 92   %tile: 1   %tile: 99     Z-score: 1.41    Z-score: -2.35   Z-score: 2.44      Goal: 0.83 kg/mo (27.7 g/day)  Goal: 1.34 cm/mo  Goal: 2.69 cm/mo      Adequacy of Growth: Weight gain velocity 136 % of reference standard (193.9 g/week) to maintain current percentile   Growth trends: Weight for age change Z-score (from birth): +1.16   Growth Analysis/Comments: Monitoring overall growth. Dosing weight remains birth wt as current wt is probably due to fluid accumulation.    Nutritional/Medical Pertinent Data: encephalopathy, dysmorphic craniofacial features, hypernatremia, lactic acidosis, elevated transaminase level, hypoalbuminemia, thrombocytopenia, hyperglycemia, idiopathic hypotension, PDA, hypokalemia, anemia. Transferred from [facility] for further management of HIE. Pt with blood tinged output from OG today.  Respiratory Support: on SIMV   Labs: reviewed      Dosing Weight: 3100 g     Current Diet Order    Enteral/PO: NPO   IVF/Meds: 15 mls/kg/day med drips   TPN Order Rx: 130 mls/kg/day Dextrose 10%, Protein 3.0 gms/kg/day, and 20% Lipid (3 gms/kg/day). GIR of 8.0 mg/kg/min.     Nutrition Intake Analysis and Assessment:   Current nutrition prescription at 145 ml/kg/day provides 81 kcal/kg/day, and 3.0 gm protein/kg/day  Please have pt fill out mri checklist and fax to [phone] thanks.   Adequacy of Intake: Meeting 74% estimated energy and 100% protein needs   Additional Information/Diet Tolerance: Tolerating TPN per labs.     Estimated Nutritional Needs:   Energy: 110-120 kcal/kg/day   Protein: 3.0 g/kg/day   Fluid: >140 ml/kg/day or per MD discretion     Current Nutrition Status: Severely Compromised     NUTRITION DIAGNOSIS (PES STATEMENT)   Inadequate Caloric Intake related to Current Formula and / or Rate (Volume) as evidenced by Medical status     Nutrition Plan   Current Nutrition Therapy: Monitor Weight and Parenteral Nutrition (TPN/PPN)   Continue Nutrition Therapy: Yes    Monitor: Enteral/Parenteral Tolerance, Intake & Output, Lab Results and Weight   Care Plan Discussed With: Multidisciplinary Team     Goal : Meet 75 to 100% of estimated needs/all source nutrient intake   Goal Progress: Initiated   Timeframe to achieve: By Discharge     Monitoring and Evaluation Criteria/Goals:   1) Continue with NPO/TPN. Consider starting feeds with breast milk or term infant formula.  2) Continue to advance GIR if able to max of 13 mg/kg/min in TPN.  3) Keep protein at max of 3 gms/kg/day in TPN.  4) Keep lipid at max of 3 gms/kg/day in TPN.  5) Monitor weekly labs and TG level while on TPN.  Will continue to monitor clinical progress/changes, TPN tolerance, growth parameters.     Malnutrition Status:   Unable to determine malnutrition status at this time per indicators

## 2021-07-22 NOTE — PROGRESS NOTES
[facility]  Office: [address], DO, Ame Mayer, DO, Bernice Bend, DO, Julio Vallemilo Blood, DO, Bennie Marques MD, Jessi Coulter MD, Ye Guerrero MD, Stephanie Bell MD, Benedicto Beck MD, Saman Gonzales MD, Jennifer Webb MD, Hi Go, DO, Quin Talbot MD, Toby Ramirez, DO, Michaela Tompkins MD,  Johann Galeazzi, DO, Eri Richards MD, Kandice Herman MD, Kt Avelar MD, Tom Roberto MD, Pearl Kuo MD, Federico Alicia MD, Shant Mares [facility], [facility], CNP, Porter Linder, CNP, Chele Torres, CNS, Moris Munoz, CNP, Shira Wasserman, CNP, Talia Aviles, CNP, Esme Dalton, CNP, Pam Shirley, CNP, Itzel Ramirez PA-C, Radha Torres, [facility], Radha Nicole, CNP, Sung oByd, CNP, Yaima Woody, CNP, Estrella Martinez, CNP, Piedad Carpenter, CNP, Orestes Joseph, CNP, Krystian Barry, CNP, [facility], [address] [place] [address]    Progress Note    7/22/2021    3:03 PM    Name:   Dedrick Rivero  MRN:     [id number]     Acct:      [de-identified]   Room:   2022/2022-01  IP Day:  1  Admit Date:  7/21/2021  3:06 PM    PCP:   Agustin Sal MD  Code Status:  Full Code    Subjective:     C/C:   Chief Complaint   Patient presents with    Back Pain     states chronic back pain becoming worse.  has recently started physical therapy   Abdominal pain     Interval History Status: worsened.    Patient seen and examined at bedside, she is uncomfortable and in pain, pain is mainly abdominal, continue to graded at 10/10, did not pass gas since coming to the hospital.  Afebrile but hypoxic and tachycardic   Currently on 4 L nasal cannula   She has chronic back pain but for me she denies any worsening   She reports increased cough over the last 1 week with more phlegm   She has been having constipation over the last 1 to 2 weeks   She denies any fall or trauma   I made her aware of the new T5 fracture and the worsening in the T6 fracture  patient denies any chest pain, chills, fevers, nausea or vomiting. Patient vitals, labs and all providers notes were reviewed,from overnight shift and morning updates were noted and discussed with the nurse      Brief History:     Per chart  This is a 68-year-old female with underlying history of hypertension, COPD, anxiety/depression, UTI, thoracic compression fractures, peripheral neuropathy, migraine headaches, seizure disorder, and alcoholism (last drink 6 months ago) who presents the emergency department with generalized malaise, headache and abdominal/back pain. Initial blood work revealed leukocytosis, elevated alkaline phosphatase, she was tachycardic and hypoxic as well, imaging revealed picture of acute diverticulitis/colitis of left colon without peripheral abscess along with chronic pancreatitis, CT spine revealed stable T7-T9 and T11 compression fracture, worsening of compression fracture of T6 and new T5 and superior L5 subacute fracture. Patient was admitted for further management. Also mentioned rash on her back, from location and morphology seems to be related to heating beds    Review of Systems:     Review of Systems   Constitutional: Positive for activity change, appetite change and fatigue. Negative for chills, diaphoresis and fever. HENT: Negative for congestion. Eyes: Negative for visual disturbance. Respiratory: Positive for cough and shortness of breath. Negative for chest tightness and wheezing. Cardiovascular: Negative for chest pain, palpitations and leg swelling. Gastrointestinal: Positive for abdominal pain. Negative for blood in stool, constipation, diarrhea, nausea and vomiting. Genitourinary: Negative for difficulty urinating. Musculoskeletal: Positive for arthralgias and back pain. Skin: Positive for rash. Neurological: Positive for weakness. Negative for dizziness, light-headedness, numbness and headaches.    All other systems reviewed and are negative. Medications:      Allergies:  No Known Allergies    Current Meds:   Scheduled Meds:    tiotropium  2 puff Inhalation Daily    predniSONE  40 mg Oral Daily    levalbuterol  1.25 mg Nebulization Once    amLODIPine  5 mg Oral Daily    baclofen  10 mg Oral TID    budesonide-formoterol  2 puff Inhalation BID    busPIRone  15 mg Oral TID    carBAMazepine  200 mg Oral BID    ferrous sulfate  325 mg Oral BID    FLUoxetine  10 mg Oral Daily    folic acid  1 mg Oral Daily    hydrOXYzine  25 mg Oral Q8H    potassium chloride  20 mEq Oral Daily with breakfast    pregabalin  200 mg Oral TID    rOPINIRole  1 mg Oral Nightly    sodium chloride flush  5-40 mL Intravenous 2 times per day    enoxaparin  40 mg Subcutaneous Daily    piperacillin-tazobactam  3,375 mg Intravenous Q8H    vancomycin (VANCOCIN) intermittent dosing (placeholder)   Other RX Placeholder    vancomycin  1,000 mg Intravenous Q12H    polyethylene glycol  17 g Oral Daily     Continuous Infusions:    sodium chloride      [Held by provider] sodium chloride Stopped (07/22/21 0446)    sodium chloride       PRN Meds: fentanNYL, traZODone, sodium chloride flush, sodium chloride, acetaminophen **OR** acetaminophen, oxyCODONE-acetaminophen **OR** oxyCODONE-acetaminophen, melatonin    Data:     Past Medical History:   has a past medical history of Acute respiratory failure with hypoxia ([address] Alcohol withdrawal syndrome, with delirium ([address] Alcoholism ([address] Anemia, 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 Social History:   reports that she has been smoking cigarettes. She has a 15.00 pack-year smoking history. She has never used smokeless tobacco. She reports previous alcohol use. She reports current drug use. Frequency: 2.00 times per week. Drug: Marijuana. Family History:   Family History   Problem Relation Age of Onset    Other Father     Cancer Maternal Grandmother     Heart Disease Paternal Grandmother     Esophageal Cancer Maternal Aunt        Vitals:  /86   Pulse 107   Temp 99.2 °F (37.3 °C) (Oral)   Resp 20   Ht 5' 5\" (1.651 m)   Wt 140 lb (63.5 kg)   SpO2 95%   BMI 23.30 kg/m²   Temp (24hrs), Av.3 °F (36.8 °C), Min:97.5 °F (36.4 °C), Max:99.2 °F (37.3 °C)    Recent Labs     21  1551   POCGLU 141*       I/O (24Hr):     Intake/Output Summary (Last 24 hours) at 2021 1503  Last data filed at 2021 0538  Gross per 24 hour   Intake 2725 ml   Output 300 ml   Net 2425 ml       Labs:  Hematology:  Recent Labs     21  1600 21  0322 21  0455   WBC 21.5* 22.0*  --    RBC 5.08 4.44  --    HGB 14.9 13.0  --    HCT 44.3 41.1  --    MCV 87.2 92.6  --    MCH 29.3 29.3  --    MCHC 33.6 31.6  --    RDW 13.6 13.9  --    * 372  --    MPV 9.7 9.6  --    CRP  --   --  326.9*   INR  --  0.9  --      Chemistry:  Recent Labs     21  1551 21  1600 21  1800 21  0322 21  0455 21  1154   NA  --  134*  --  136  --   --    K  --  4.6  --  4.0  --   --    CL  --  94*  --  101  --   --    CO2  --  25  --  21  --   --    GLUCOSE  --  128*  --  104*  --   --    BUN  --  15  --  14  --   --    CREATININE 0.54 0.41*  --  0.29*  --   --    MG  --   --   --  1.7  --   --    ANIONGAP  --  15  --  14  --   --    LABGLOM >60 >60  --  >60  --   --    GFRAA  --  >60  --  >60  --   --    CALCIUM  --  10.3  --  9.1  --   --    CAION  --   --   --  1.30  --   --    PROBNP  --   --   --   --  1,084*  -- LACTACIDWB  --   --  1.1  --   --  0.8     Recent Labs     07/21/21  1551 07/21/21  1600 07/22/21  0322 07/22/21  0455   PROT  --  8.5* 6.8  --    LABALBU  --  4.0 3.0*  --    AST  --  24 23  --    ALT  --  12 9  --    ALKPHOS  --  283* 226*  --    BILITOT  --  0.68 0.46  --    AMYLASE  --   --   --  78   LIPASE  --   --   --  67*   POCGLU 141*  --   --   --      ABG:  Lab Results   Component Value Date    POCPH 7.452 10/16/2020    POCPCO2 37.0 10/16/2020    POCPO2 80.7 10/16/2020    POCHCO3 25.9 10/16/2020    NBEA NOT REPORTED 10/16/2020    PBEA 2 10/16/2020    LPP9THL 27 10/16/2020    JYRS4ZLI 96 10/16/2020    FIO2 NOT REPORTED 07/21/2021     Lab Results   Component Value Date/Time    SPECIAL  R FOREARM 2 ML 07/21/2021 10:56 PM     Lab Results   Component Value Date/Time    CULTURE NO GROWTH 5 HOURS 07/21/2021 10:56 PM       Radiology:  XR ABDOMEN (KUB) (SINGLE AP VIEW)    Result Date: 7/22/2021  Mild dilation of the right hemicolon and patchy small bowel gas most likely due to ileus. XR CHEST PORTABLE    Result Date: 7/22/2021  No radiologic evidence of acute cardiopulmonary disease. CT CHEST ABDOMEN PELVIS W CONTRAST    Result Date: 7/21/2021  Probable acute diverticulitis or colitis left colon. No evidence of perforation or abscess at this time. Chronic pancreatitis. Multiple compression fractures some of which are new since the previous exam. Lingular ground-glass infiltrate. Recommend follow-up to resolution. Intra-atrial lipoma right atrium. Cardiac echo may be helpful. CT LUMBAR SPINE TRAUMA RECONSTRUCTION    Result Date: 7/21/2021  CT thoracic spine: 1. Decrease in height in T6 compared to prior study compatible with worsening compression with subacute etiology and mild protrusion of the dorsal aspect of T6. Narrowed T6-T7 neural foramina. 2.  Chronic superior height T8, T9, and T11 without interval change from prior study.  3.  Mild prevertebral soft tissue prominence T6 without evidence of abscess formation. CT lumbar spine: 1. No traumatic malalignment. 2.  Compression fracture superior L5 with subacute appearance. 3.  Calcifications in the abdomen incompletely included appearance suggesting pancreatic calcifications. RECOMMENDATIONS: Please reference CT chest, abdomen and pelvis of same day. CT THORACIC SPINE TRAUMA RECONSTRUCTION    Result Date: 7/21/2021  CT thoracic spine: 1. Decrease in height in T6 compared to prior study compatible with worsening compression with subacute etiology and mild protrusion of the dorsal aspect of T6. Narrowed T6-T7 neural foramina. 2.  Chronic superior height T8, T9, and T11 without interval change from prior study. 3.  Mild prevertebral soft tissue prominence T6 without evidence of abscess formation. CT lumbar spine: 1. No traumatic malalignment. 2.  Compression fracture superior L5 with subacute appearance. 3.  Calcifications in the abdomen incompletely included appearance suggesting pancreatic calcifications. RECOMMENDATIONS: Please reference CT chest, abdomen and pelvis of same day. Physical Examination:        Physical Exam  Vitals and nursing note reviewed. Constitutional:       General: She is in acute distress. HENT:      Head: Normocephalic and atraumatic. Eyes:      Conjunctiva/sclera: Conjunctivae normal.      Pupils: Pupils are equal, round, and reactive to light. Cardiovascular:      Rate and Rhythm: Normal rate and regular rhythm. Heart sounds: No murmur heard. Pulmonary:      Effort: Pulmonary effort is normal. No accessory muscle usage or respiratory distress. Breath sounds: No stridor. Decreased breath sounds present. No wheezing, rhonchi or rales. Comments: On nasal canula  Abdominal:      General: Bowel sounds are decreased. There is no distension. Palpations: Abdomen is soft. Tenderness: There is generalized abdominal tenderness and tenderness in the right upper quadrant and epigastric area. There is no guarding. Musculoskeletal:         General: No tenderness. Skin:     General: Skin is warm and dry. Findings: Rash ( Diffuse rash on the back ) present. No erythema or lesion. Neurological:      Mental Status: She is alert and oriented to person, place, and time. Cranial Nerves: No cranial nerve deficit. Motor: No seizure activity. Psychiatric:         Speech: Speech normal.         Behavior: Behavior normal. Behavior is cooperative. Assessment:        Hospital Problems         Last Modified POA    * (Principal) Sepsis ([address] 7/22/2021 Yes    Acute respiratory failure with hypoxia ([address] 7/22/2021 Yes    Diverticulitis of large intestine without perforation or abscess without bleeding 7/22/2021 Yes    Essential hypertension 7/21/2021 Yes    Tobacco use 7/21/2021 Yes    Seizures ([address] 7/21/2021 Yes    COPD with acute exacerbation ([address] 7/22/2021 Yes    Recurrent major depressive disorder ([address] 7/21/2021 Yes    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24, the patient underwent 2 surgical resections without known recurrence 7/21/2021 Yes    AMAN (generalized anxiety disorder) 7/21/2021 Yes    Chronic peripheral neuropathic pain 7/21/2021 Yes    History of alcohol abuse 7/21/2021 Yes    Personal history of astrocytoma 7/21/2021 Yes    Marijuana abuse 7/21/2021 Yes    Closed fracture of fifth thoracic vertebra ([address] 7/22/2021 Yes    Elevated brain natriuretic peptide (BNP) level 7/22/2021 Yes    Elevated alkaline phosphatase level 7/22/2021 Yes    Chronic pancreatitis ([address] 7/22/2021 Yes    Erythema ab igne 7/22/2021 Yes          Plan:        Sepsis: Likely secondary to colitis/diverticulitis, continue IV antibiotic, cultures are pending: Continue hydration    Colitis/diverticulitis/chronic pancreatitis/constipation: Patient still with significant pain, very hypoactive bowel sounds, will get KUB ( suspect ileus) , repeat lactic acid  Pain control.   Appreciate GI input    Chronic compression fracture in the thoracic vertebrae, there is worsening and compression fracture at T6 and new fracture at T5 and L5, neurosurgery consultation in place. Patient denies any trauma or injury or falls  Patient has known osteoporosis     COPD exacerbation: Continue breathing treatments and start oral prednisone    Acute hypoxemic respiratory failure: Likely secondary to sepsis and COPD, continue supplemental oxygen    Elevated BNP: Patient and echocardiogram end of last year with preserved ejection fraction and no significant valvular disease, repeat orders in. Seizure disorder: Follows up with neurology as an outpatient, continue her home medications. History of alcohol use: Per the patient report she is sober for 6 months, mother does report patient did have a drink in March. Elevated alkaline phosphatase: Order AFP and GGT    HTN: continue home medications assessment and plan the patient is so sick    HPL: continue home medications    Depression    Erythema Ab igne : On her back from prolonged use of heating Pads  . DVT prophylaxis    Patient condition continues to be critical , continue to monitor very closely    Discussed in detail with the patient, her mother and the nurse    Addendum : KUB with picture of ileus, discussed with GI, NPO.       Spent 40+ minutes  in examining, assessing and adjusting treatment / adding work up and discussing the plane with  with patient/staff and specialist         Farhat Mack MD  7/22/2021  3:03 PM

## 2021-07-23 ENCOUNTER — APPOINTMENT (OUTPATIENT)
Dept: GENERAL RADIOLOGY | Age: 51
DRG: 720 | End: 2021-07-23
Payer: MEDICARE

## 2021-07-23 ENCOUNTER — APPOINTMENT (OUTPATIENT)
Dept: MRI IMAGING | Age: 51
DRG: 720 | End: 2021-07-23
Payer: MEDICARE

## 2021-07-23 LAB
-: ABNORMAL
ABSOLUTE EOS #: 0 K/UL (ref 0–0.4)
ABSOLUTE IMMATURE GRANULOCYTE: 0 K/UL (ref 0–0.3)
ABSOLUTE LYMPH #: 2.07 K/UL (ref 1–4.8)
ABSOLUTE MONO #: 1.61 K/UL (ref 0.1–0.8)
ALLEN TEST: ABNORMAL
AMORPHOUS: ABNORMAL
ANION GAP SERPL CALCULATED.3IONS-SCNC: 12 MMOL/L (ref 9–17)
BACTERIA: ABNORMAL
BASOPHILS # BLD: 0 % (ref 0–2)
BASOPHILS ABSOLUTE: 0 K/UL (ref 0–0.2)
BILIRUBIN URINE: NEGATIVE
BUN BLDV-MCNC: 14 MG/DL (ref 6–20)
BUN/CREAT BLD: ABNORMAL (ref 9–20)
CALCIUM SERPL-MCNC: 8.9 MG/DL (ref 8.6–10.4)
CARBOXYHEMOGLOBIN: 0.9 % (ref 0–5)
CASTS UA: ABNORMAL /LPF (ref 0–8)
CHLORIDE BLD-SCNC: 104 MMOL/L (ref 98–107)
CO2: 23 MMOL/L (ref 20–31)
COLOR: YELLOW
CREAT SERPL-MCNC: 0.57 MG/DL (ref 0.5–0.9)
CRYSTALS, UA: ABNORMAL /HPF
CULTURE: NO GROWTH
DIFFERENTIAL TYPE: ABNORMAL
EOSINOPHILS RELATIVE PERCENT: 0 % (ref 1–4)
EPITHELIAL CELLS UA: ABNORMAL /HPF (ref 0–5)
FIO2: ABNORMAL
GFR AFRICAN AMERICAN: >60 ML/MIN
GFR NON-AFRICAN AMERICAN: >60 ML/MIN
GFR SERPL CREATININE-BSD FRML MDRD: ABNORMAL ML/MIN/{1.73_M2}
GFR SERPL CREATININE-BSD FRML MDRD: ABNORMAL ML/MIN/{1.73_M2}
GLUCOSE BLD-MCNC: 131 MG/DL (ref 70–99)
GLUCOSE URINE: NEGATIVE
HCO3 VENOUS: 27 MMOL/L (ref 24–30)
HCT VFR BLD CALC: 38.5 % (ref 36.3–47.1)
HEMOGLOBIN: 12 G/DL (ref 11.9–15.1)
IMMATURE GRANULOCYTES: 0 %
KETONES, URINE: ABNORMAL
LEUKOCYTE ESTERASE, URINE: NEGATIVE
LYMPHOCYTES # BLD: 9 % (ref 24–44)
Lab: NORMAL
MCH RBC QN AUTO: 29.3 PG (ref 25.2–33.5)
MCHC RBC AUTO-ENTMCNC: 31.2 G/DL (ref 28.4–34.8)
MCV RBC AUTO: 93.9 FL (ref 82.6–102.9)
METHEMOGLOBIN: ABNORMAL % (ref 0–1.5)
MODE: ABNORMAL
MONOCYTES # BLD: 7 % (ref 1–7)
MORPHOLOGY: NORMAL
MUCUS: ABNORMAL
NEGATIVE BASE EXCESS, VEN: ABNORMAL MMOL/L (ref 0–2)
NITRITE, URINE: POSITIVE
NOTIFICATION TIME: ABNORMAL
NOTIFICATION: ABNORMAL
NRBC AUTOMATED: 0 PER 100 WBC
O2 DEVICE/FLOW/%: ABNORMAL
O2 SAT, VEN: 89.6 % (ref 60–85)
OTHER OBSERVATIONS UA: ABNORMAL
OXYHEMOGLOBIN: ABNORMAL % (ref 95–98)
PATIENT TEMP: 37
PCO2, VEN, TEMP ADJ: ABNORMAL MMHG (ref 39–55)
PCO2, VEN: 50 (ref 39–55)
PDW BLD-RTO: 13.9 % (ref 11.8–14.4)
PEEP/CPAP: ABNORMAL
PH UA: 6 (ref 5–8)
PH VENOUS: 7.35 (ref 7.32–7.42)
PH, VEN, TEMP ADJ: ABNORMAL (ref 7.32–7.42)
PHOSPHORUS: 3.7 MG/DL (ref 2.6–4.5)
PLATELET # BLD: 361 K/UL (ref 138–453)
PLATELET ESTIMATE: ABNORMAL
PMV BLD AUTO: 9.9 FL (ref 8.1–13.5)
PO2, VEN, TEMP ADJ: ABNORMAL MMHG (ref 30–50)
PO2, VEN: 63.3 (ref 30–50)
POSITIVE BASE EXCESS, VEN: 1.2 MMOL/L (ref 0–2)
POTASSIUM SERPL-SCNC: 4.5 MMOL/L (ref 3.7–5.3)
PROTEIN UA: ABNORMAL
PSV: ABNORMAL
PT. POSITION: ABNORMAL
RBC # BLD: 4.1 M/UL (ref 3.95–5.11)
RBC # BLD: ABNORMAL 10*6/UL
RBC UA: ABNORMAL /HPF (ref 0–4)
RENAL EPITHELIAL, UA: ABNORMAL /HPF
RESPIRATORY RATE: ABNORMAL
SAMPLE SITE: ABNORMAL
SEG NEUTROPHILS: 84 % (ref 36–66)
SEGMENTED NEUTROPHILS ABSOLUTE COUNT: 19.32 K/UL (ref 1.8–7.7)
SET RATE: ABNORMAL
SODIUM BLD-SCNC: 139 MMOL/L (ref 135–144)
SPECIFIC GRAVITY UA: 1.09 (ref 1–1.03)
SPECIMEN DESCRIPTION: NORMAL
TEXT FOR RESPIRATORY: ABNORMAL
TOTAL HB: ABNORMAL G/DL (ref 12–16)
TOTAL RATE: ABNORMAL
TRICHOMONAS: ABNORMAL
TURBIDITY: CLEAR
URINE HGB: ABNORMAL
UROBILINOGEN, URINE: NORMAL
VANCOMYCIN RANDOM DATE LAST DOSE: NORMAL
VANCOMYCIN RANDOM DOSE AMOUNT: NORMAL
VANCOMYCIN RANDOM TIME LAST DOSE: NORMAL
VANCOMYCIN RANDOM: 13.7 UG/ML
VT: ABNORMAL
WBC # BLD: 23 K/UL (ref 3.5–11.3)
WBC # BLD: ABNORMAL 10*3/UL
WBC UA: ABNORMAL /HPF (ref 0–5)
YEAST: ABNORMAL

## 2021-07-23 PROCEDURE — 6370000000 HC RX 637 (ALT 250 FOR IP): Performed by: NURSE PRACTITIONER

## 2021-07-23 PROCEDURE — 72070 X-RAY EXAM THORAC SPINE 2VWS: CPT

## 2021-07-23 PROCEDURE — 6360000002 HC RX W HCPCS: Performed by: INTERNAL MEDICINE

## 2021-07-23 PROCEDURE — 72100 X-RAY EXAM L-S SPINE 2/3 VWS: CPT

## 2021-07-23 PROCEDURE — 6360000002 HC RX W HCPCS: Performed by: NURSE PRACTITIONER

## 2021-07-23 PROCEDURE — 2700000000 HC OXYGEN THERAPY PER DAY

## 2021-07-23 PROCEDURE — 76937 US GUIDE VASCULAR ACCESS: CPT

## 2021-07-23 PROCEDURE — 99232 SBSQ HOSP IP/OBS MODERATE 35: CPT | Performed by: FAMILY MEDICINE

## 2021-07-23 PROCEDURE — 80048 BASIC METABOLIC PNL TOTAL CA: CPT

## 2021-07-23 PROCEDURE — 99232 SBSQ HOSP IP/OBS MODERATE 35: CPT | Performed by: INTERNAL MEDICINE

## 2021-07-23 PROCEDURE — 2060000000 HC ICU INTERMEDIATE R&B

## 2021-07-23 PROCEDURE — 80202 ASSAY OF VANCOMYCIN: CPT

## 2021-07-23 PROCEDURE — 72148 MRI LUMBAR SPINE W/O DYE: CPT

## 2021-07-23 PROCEDURE — 71045 X-RAY EXAM CHEST 1 VIEW: CPT

## 2021-07-23 PROCEDURE — 6370000000 HC RX 637 (ALT 250 FOR IP): Performed by: FAMILY MEDICINE

## 2021-07-23 PROCEDURE — 85025 COMPLETE CBC W/AUTO DIFF WBC: CPT

## 2021-07-23 PROCEDURE — 2580000003 HC RX 258: Performed by: NURSE PRACTITIONER

## 2021-07-23 PROCEDURE — 99232 SBSQ HOSP IP/OBS MODERATE 35: CPT | Performed by: NEUROLOGICAL SURGERY

## 2021-07-23 PROCEDURE — 72146 MRI CHEST SPINE W/O DYE: CPT

## 2021-07-23 PROCEDURE — 6360000002 HC RX W HCPCS: Performed by: PHYSICIAN ASSISTANT

## 2021-07-23 PROCEDURE — APPSS30 APP SPLIT SHARED TIME 16-30 MINUTES: Performed by: PHYSICIAN ASSISTANT

## 2021-07-23 PROCEDURE — 84100 ASSAY OF PHOSPHORUS: CPT

## 2021-07-23 PROCEDURE — 82805 BLOOD GASES W/O2 SATURATION: CPT

## 2021-07-23 PROCEDURE — 72072 X-RAY EXAM THORAC SPINE 3VWS: CPT

## 2021-07-23 PROCEDURE — 94640 AIRWAY INHALATION TREATMENT: CPT

## 2021-07-23 PROCEDURE — 94761 N-INVAS EAR/PLS OXIMETRY MLT: CPT

## 2021-07-23 PROCEDURE — 36415 COLL VENOUS BLD VENIPUNCTURE: CPT

## 2021-07-23 RX ORDER — LORAZEPAM 2 MG/ML
1 INJECTION INTRAMUSCULAR
Status: COMPLETED | OUTPATIENT
Start: 2021-07-23 | End: 2021-07-23

## 2021-07-23 RX ORDER — HYDROXYZINE HYDROCHLORIDE 25 MG/1
25 TABLET, FILM COATED ORAL 3 TIMES DAILY PRN
Status: DISCONTINUED | OUTPATIENT
Start: 2021-07-23 | End: 2021-08-03 | Stop reason: HOSPADM

## 2021-07-23 RX ADMIN — FERROUS SULFATE TAB EC 325 MG (65 MG FE EQUIVALENT) 325 MG: 325 (65 FE) TABLET DELAYED RESPONSE at 21:43

## 2021-07-23 RX ADMIN — CARBAMAZEPINE 200 MG: 100 TABLET, EXTENDED RELEASE ORAL at 15:14

## 2021-07-23 RX ADMIN — PIPERACILLIN AND TAZOBACTAM 3375 MG: 3; .375 INJECTION, POWDER, FOR SOLUTION INTRAVENOUS at 10:00

## 2021-07-23 RX ADMIN — HYDROXYZINE HYDROCHLORIDE 25 MG: 25 TABLET ORAL at 15:15

## 2021-07-23 RX ADMIN — BUDESONIDE AND FORMOTEROL FUMARATE DIHYDRATE 2 PUFF: 160; 4.5 AEROSOL RESPIRATORY (INHALATION) at 21:34

## 2021-07-23 RX ADMIN — BUSPIRONE HYDROCHLORIDE 15 MG: 15 TABLET ORAL at 21:43

## 2021-07-23 RX ADMIN — PIPERACILLIN AND TAZOBACTAM 3375 MG: 3; .375 INJECTION, POWDER, FOR SOLUTION INTRAVENOUS at 01:10

## 2021-07-23 RX ADMIN — BISACODYL 5 MG: 5 TABLET, COATED ORAL at 17:54

## 2021-07-23 RX ADMIN — CARBAMAZEPINE 200 MG: 100 TABLET, EXTENDED RELEASE ORAL at 21:42

## 2021-07-23 RX ADMIN — BACLOFEN 10 MG: 10 TABLET ORAL at 21:43

## 2021-07-23 RX ADMIN — PREGABALIN 200 MG: 100 CAPSULE ORAL at 15:15

## 2021-07-23 RX ADMIN — TIOTROPIUM BROMIDE INHALATION SPRAY 2 PUFF: 3.12 SPRAY, METERED RESPIRATORY (INHALATION) at 11:48

## 2021-07-23 RX ADMIN — OXYCODONE HYDROCHLORIDE AND ACETAMINOPHEN 2 TABLET: 5; 325 TABLET ORAL at 15:21

## 2021-07-23 RX ADMIN — AMLODIPINE BESYLATE 5 MG: 5 TABLET ORAL at 15:14

## 2021-07-23 RX ADMIN — POTASSIUM CHLORIDE 20 MEQ: 1500 TABLET, EXTENDED RELEASE ORAL at 15:33

## 2021-07-23 RX ADMIN — ROPINIROLE HYDROCHLORIDE 1 MG: 1 TABLET, FILM COATED ORAL at 23:25

## 2021-07-23 RX ADMIN — FLUOXETINE 10 MG: 10 CAPSULE ORAL at 21:44

## 2021-07-23 RX ADMIN — VANCOMYCIN HYDROCHLORIDE 1000 MG: 1 INJECTION, SOLUTION INTRAVENOUS at 10:00

## 2021-07-23 RX ADMIN — ALBUTEROL SULFATE 2.5 MG: 2.5 SOLUTION RESPIRATORY (INHALATION) at 11:44

## 2021-07-23 RX ADMIN — PREGABALIN 200 MG: 100 CAPSULE ORAL at 21:43

## 2021-07-23 RX ADMIN — FOLIC ACID 1 MG: 1 TABLET ORAL at 15:15

## 2021-07-23 RX ADMIN — ENOXAPARIN SODIUM 40 MG: 40 INJECTION SUBCUTANEOUS at 15:34

## 2021-07-23 RX ADMIN — LORAZEPAM 1 MG: 2 INJECTION INTRAMUSCULAR; INTRAVENOUS at 09:00

## 2021-07-23 RX ADMIN — PIPERACILLIN AND TAZOBACTAM 3375 MG: 3; .375 INJECTION, POWDER, FOR SOLUTION INTRAVENOUS at 17:54

## 2021-07-23 RX ADMIN — PREDNISONE 40 MG: 20 TABLET ORAL at 15:15

## 2021-07-23 RX ADMIN — BUDESONIDE AND FORMOTEROL FUMARATE DIHYDRATE 2 PUFF: 160; 4.5 AEROSOL RESPIRATORY (INHALATION) at 11:48

## 2021-07-23 RX ADMIN — BACLOFEN 10 MG: 10 TABLET ORAL at 15:16

## 2021-07-23 RX ADMIN — BUSPIRONE HYDROCHLORIDE 15 MG: 15 TABLET ORAL at 15:14

## 2021-07-23 RX ADMIN — POLYETHYLENE GLYCOL 3350 17 G: 17 POWDER, FOR SOLUTION ORAL at 15:31

## 2021-07-23 ASSESSMENT — ENCOUNTER SYMPTOMS
BACK PAIN: 1
CHEST TIGHTNESS: 0
VOMITING: 0
COUGH: 1
SHORTNESS OF BREATH: 1
CONSTIPATION: 0
WHEEZING: 0
DIARRHEA: 0
NAUSEA: 0
BLOOD IN STOOL: 0
ABDOMINAL PAIN: 1

## 2021-07-23 ASSESSMENT — PAIN SCALES - GENERAL: PAINLEVEL_OUTOF10: 7

## 2021-07-23 NOTE — PROGRESS NOTES
THE [facility]   Progress Note    Dmitri Wyman is a 45 y.o. female patient. Hospitalization Day:2      Chief consult reason:   Increasing abdominal pain  ? Diverticulitis/colitis, leukocytosis      Subjective:  Patient seen and examined. Patient states her pain is only a 6 out of 10 and that she is feeling better. Requesting diet and laxative. VITALS:  BP (!) 140/90   Pulse 91   Temp 97.9 °F (36.6 °C) (Axillary)   Resp 22   Ht 5' 5\" (1.651 m)   Wt 140 lb (63.5 kg)   SpO2 97%   BMI 23.30 kg/m²   TEMPERATURE:  Current - Temp: 97.9 °F (36.6 °C); Max - Temp  Av.4 °F (37.4 °C)  Min: 97.9 °F (36.6 °C)  Max: 101.7 °F (38.7 °C)    Physical Assessment:  General appearance:  alert, cooperative and no distress  Mental Status:  oriented to person, place and time and normal affect  Lungs:  clear to auscultation bilaterally, normal effort  Heart:  regular rate and rhythm, no murmur  Abdomen:  soft,  slightly  tender, nondistended, normal bowel sounds, no masses, hepatomegaly, splenomegaly  Extremities:  no edema, redness, tenderness in the calves  Skin:  no gross lesions, rashes, induration    Data Review:    Labs and Imaging:     CBC:  Recent Labs     21  0629   WBC 21.5* 22.0* 23.0*   HGB 14.9 13.0 12.0   MCV 87.2 92.6 93.9   RDW 13.6 13.9 13.9   * 372 361       ANEMIA STUDIES:  No results for input(s): LABIRON, TIBC, FERRITIN, [id number], FOLATE, OCCULTBLD in the last 72 hours.     BMP:  Recent Labs     21  1600 21  03221  0629   * 136 139   K 4.6 4.0 4.5   CL 94* 101 104   CO2 25 21 23   BUN 15 14 14   CREATININE 0.41* 0.29* 0.57   GLUCOSE 128* 104* 131*   CALCIUM 10.3 9.1 8.9       LFTS:  Recent Labs     21  0322   ALKPHOS 283* 226*   ALT 12 9   AST 24 23   BILITOT 0.68 0.46   LABALBU 4.0 3.0*       Amylase/Lipase and Ammonia:  Recent Labs     21  0455   AMYLASE 78   LIPASE 67*       Acute Hepatitis Panel:  Lab Results   Component Value Date    HEPBSAG NONREACTIVE 07/14/2019    HEPCAB NONREACTIVE 07/14/2019    HEPBIGM NONREACTIVE 07/14/2019    HEPAIGM NONREACTIVE 07/14/2019       HCV Genotype:  No results found for: HEPATITISCGENOTYPE    HCV Quantitative:  No results found for: HCVQNT    LIVER WORK UP:    AFP  Lab Results   Component Value Date    AFP 7.3 07/21/2021       Alpha 1 antitrypsin   No results found for: A1A    BRADLEY  Lab Results   Component Value Date    BRADLEY NEGATIVE 04/13/2021       AMA  No results found for: [place]    ASMA  No results found for: SMOOTHMUSCAB    PT/INR  Recent Labs     07/22/21  0322   PROTIME 9.6   INR 0.9       Cancer Markers:  CEA:  No results for input(s): CEA in the last 72 hours. Ca 125:  No results for input(s):  in the last 72 hours. Ca 19-9:   Invalid input(s):   AFP:   Recent Labs     07/21/21  1600   AFP 7.3     Lactic acid:Invalid input(s): LACTIC ACID    Radiology Review:    No results found. Principal Problem:    Sepsis ([address]  Active Problems:    Essential hypertension    Tobacco use    Seizures ([facility]    COPD with acute exacerbation (HCC)    Acute respiratory failure with hypoxia (HCC)    Recurrent major depressive disorder ([facility]    Astrocytoma ([facility] [facility] - 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24, the patient underwent 2 surgical resections without known recurrence    AMAN (generalized anxiety disorder)    Chronic peripheral neuropathic pain    History of alcohol abuse    Personal history of astrocytoma    Marijuana abuse    Closed fracture of fifth thoracic vertebra (HCC)    Diverticulitis of large intestine without perforation or abscess without bleeding    Elevated brain natriuretic peptide (BNP) level    Elevated alkaline phosphatase level    Chronic pancreatitis (HCC)    Erythema ab igne    Compression fracture of thoracic vertebra (HCC)    Compression of lumbar vertebra (HCC)  Resolved Problems:    * No resolved hospital problems.  *       GI Impression:    1. Diverticulitis with abdominal pain, bloating, distention  2. Constipation    Plan and Recommendations:    1. Please give as needed laxatives as ordered  2. Recommend decreasing narcotics-consider alternatives such as muscle relaxers  3. Full liquid diet-may advance as tolerated if okay with other services  4. Once pain is under control patient can be discharged home on Cipro 500 mg p.o. twice daily for total of 10 days, Flagyl 500 mg p.o. 3 times daily for total of 10 days ordered  5. Patient will need to follow-up in the office 1 to 2 weeks post discharge for general follow-up and to be scheduled for repeat colonoscopy 6 to 8 weeks after resolution of current diverticulitis due to poor prep on colonoscopy in 2020  6. Alcohol and smoking cessation advised. 7. GI will sign off. This plan was formulated in collaboration with Dr. Edith Galindo MD    Thank you for allowing me to participate in the care of your patient. Please feel free to contact me with any questions or concerns. 2 [facility]    This note was created with the assistance of a speech-recognition program.  Although the intention is to generate a document that actually reflects the content of the visit, no guarantees can be provided that every mistake has been identified and corrected by editing. Attending Physican Attestation  Patient seen and examined with Ms. Kurtis Gentile CNP.  I have reviewed the above note and confirmed the key elements of the medical history and physical exam. I have discussed the findings, established the care plan and recommendations with Ms. HERNANDEZOKEMARQUISE GRAHAM [facility] and primary team.    Donald Douglass MD MD  Gastroenterology

## 2021-07-23 NOTE — PROGRESS NOTES
Pharmacy Vancomycin Consult     Vancomycin Day: 3  Current Dosinmg q12  Current indication: sepsis secondary to colitis/diverticulitis    Temp max:  101.7 F    Recent Labs     21  0322 21  0629   BUN 14 14   CREATININE 0.29* 0.57   WBC 22.0* 23.0*       Intake/Output Summary (Last 24 hours) at 2021 0811  Last data filed at 2021 9951  Gross per 24 hour   Intake 1440 ml   Output 500 ml   Net 940 ml     Culture Date      Source                       Results                        blood                          no growth                        urine                           no growth    Ht Readings from Last 1 Encounters:   21 5' 5\" (1.651 m)        Wt Readings from Last 1 Encounters:   21 140 lb (63.5 kg)       Body mass index is 23.3 kg/m². Estimated Creatinine Clearance: 104 mL/min (based on SCr of 0.57 mg/dL). Random: 13.7    Assessment/Plan:  Will continue current dose 1000mg q12.  Per [employer], predicted  and predicted trough 15.7

## 2021-07-23 NOTE — PROGRESS NOTES
Physical Therapy        Physical Therapy Cancel Note      DATE: 2021    NAME: Claudia Ventura  MRN: [id number]   : 1970      Patient not seen this date for Physical Therapy due to:    Surgery/Procedure: Pt at MRI, received Ativan      Electronically signed by Ousmane Samano PT on 2021 at 10:00 AM

## 2021-07-23 NOTE — PROGRESS NOTES
Neurosurgery [facility]/Resident    Daily Progress Note   Chief Complaint   Patient presents with    Back Pain     states chronic back pain becoming worse.  has recently started physical therapy     7/23/2021  12:50 PM    Chart reviewed. Temp max 101.7 overnight. No acute events overnight. Continues to complain of back and belly pain. Vitals:    07/23/21 1148 07/23/21 1149 07/23/21 1150 07/23/21 1151   BP:    (!) 140/90   Pulse:    91   Resp: 21 15 20 22   Temp:    97.9 °F (36.6 °C)   TempSrc:    Axillary   SpO2: 96% 96% 97% 97%   Weight:       Height:           PE:   Somnolent, easy to rouse  No increase in back pain with sitting upright in bed, no midline tenderness to palpation   Motor   L deltoid 5/5; R deltoid 5/5  L biceps 5/5; R biceps 5/5  L triceps 5/5; R triceps 5/5  L wrist extension 5/5; R wrist extension 5/5  L intrinsics 5/5; R intrinsics 5/5      L iliopsoas 5/5 , R iliopsoas 5/5  L quadriceps 5/5; R quadriceps 5/5  L Dorsiflexion 5/5; R dorsiflexion 5/5  L Plantarflexion 5/5; R plantarflexion 5/5  L EHL 5/5; R EHL 5/5    Sensation intact         Lab Results   Component Value Date    WBC 23.0 (H) 07/23/2021    HGB 12.0 07/23/2021    HCT 38.5 07/23/2021     07/23/2021    HDL 42 12/23/2020    ALT 9 07/22/2021    AST 23 07/22/2021     07/23/2021    K 4.5 07/23/2021     07/23/2021    CREATININE 0.57 07/23/2021    BUN 14 07/23/2021    CO2 23 07/23/2021    TSH 0.68 12/23/2020    INR 0.9 07/22/2021    LABA1C 5.5 04/13/2021    .9 (H) 07/22/2021    SEDRATE 37 (H) 02/03/2021       Radiology   MRI THORACIC SPINE WO CONTRAST    Result Date: 7/23/2021  EXAMINATION: MRI OF THE THORACIC SPINE WITHOUT CONTRAST  7/23/2021 9:01 am TECHNIQUE: Multiplanar multisequence MRI of the thoracic spine was performed without the administration of intravenous contrast. COMPARISON: None.  HISTORY: ORDERING SYSTEM PROVIDED HISTORY: f/u T6 compression fracutre TECHNOLOGIST PROVIDED HISTORY: f/u T6 compression papotre Is the patient pregnant?->No Reason for Exam: T6 compression fx Additional signs and symptoms: back pain FINDINGS: Evaluation is somewhat limited given artifact predominately on the STIR sequence as well as patient motion. BONES/ALIGNMENT: Compression deformities are seen of the T6, T8, T9 and T11 vertebral bodies. The T6 vertebral body demonstrates diffusely decreased T1 and T2 marrow signal compatible with the sclerosis seen on the recent CT. These compression deformities are likely chronic in nature. The remaining vertebral body heights appear grossly maintained. No evidence of spondylolisthesis. There appears to be a presumed atypical intraosseous hemangioma within the T7 vertebral body. Scattered Modic type degenerative endplate changes. There is question mild bone marrow edema within the lateral elements at the T6 and T7 level. SPINAL CORD: No abnormal cord signal is seen. SOFT TISSUES: No paraspinal mass clearly identified. Bilateral pleural effusions, left greater than right. DEGENERATIVE CHANGES: Multilevel degenerative changes of the thoracic spine without significant spinal canal stenosis. There may be moderate bilateral neural foraminal narrowing at T6-T7.     1. Limited examination. 2. Compression deformities involving T6, T8, T9 and T11. These are likely chronic in nature. Diffusely decreased T1 and T2 marrow signal within the T6 vertebral body compatible with the sclerosis seen on the prior CT. 3. There is minimal bone marrow edema involving the left lateral elements of T6 and T7. Unclear whether this is degenerative in nature or perhaps sequelae of recent or remote trauma. 4. No significant spinal canal stenosis of the thoracic spine. 5. Moderate bilateral neural foraminal narrowing at T6-T7. 6. Bilateral pleural effusions, left greater than right.      MRI LUMBAR SPINE WO CONTRAST    Result Date: 7/23/2021  EXAMINATION: MRI OF THE LUMBAR SPINE WITHOUT CONTRAST, 7/23/2021 8:12 am TECHNIQUE: Multiplanar multisequence MRI of the lumbar spine was performed without the administration of intravenous contrast. COMPARISON: None. HISTORY: ORDERING SYSTEM PROVIDED HISTORY: f/u L5 compression fracture TECHNOLOGIST PROVIDED HISTORY: f/u L5 compression fracture Is the patient pregnant?->No Reason for Exam: L5 compression fx Additional signs and symptoms: back pain FINDINGS: BONES/ALIGNMENT: There is an acute compression deformity involving the superior endplate of L5 with approximately 35% loss of height. No significant bulging of the posterior cortex. The remaining vertebral body heights appear maintained. There straightening of the normal lumbar lordosis. Minimal retrolisthesis at L5-S1. No spondylolisthesis at the remaining levels. Modic type 1 degenerative endplate changes are seen at nearly all levels of the lumbar spine. SPINAL CORD: The conus terminates at a normal level. SOFT TISSUES: No paraspinal mass identified. L1-L2: There is no significant disc bulge, spinal canal stenosis or neural foraminal narrowing. L2-L3: There is no significant disc bulge, spinal canal stenosis or neural foraminal narrowing. L3-L4: There is a disc bulge contacting the ventral thecal sac. No significant spinal canal stenosis. Minimal left neural foraminal narrowing. No significant right neural foraminal narrowing. L4-L5: There is a disc bulge contacting the ventral thecal sac. No significant spinal canal stenosis. Facet arthrosis contributes to mild bilateral neural foraminal narrowing. L5-S1: There is a disc bulge with bilateral facet arthrosis contributing to mild bilateral neural foraminal narrowing. No significant spinal canal stenosis. 1. Acute compression deformity of the superior endplate of L5 with approximately 35% loss of height. No significant bulging of the posterior cortex. 2. No significant spinal canal stenosis of the lumbar spine.  3. Neural foraminal narrowing at L3-L4 through L5-S1. 4. Minimal retrolisthesis at L5-S1. A/P  45 y.o. female who presents with atraumatic compression fracture T6, T8, T9, T11, and L5       - Obtain standing AP/LAT thoracic and lumbar films   - No activity restrictions   - Recommend consult to infectious disease       Please contact neurosurgery with any changes in patients neurologic status.        LILLIANA Palencia   12:50 PM EDT

## 2021-07-23 NOTE — PROGRESS NOTES
[facility]  Office: [address], DO, Ame Mayer, DO, [place], DO, Julio Vallemilo Blood, DO, Bennie Marques MD, Jessi Coulter MD, Ye Guerrero MD, Stephanie Bell MD, Benedicto Beck MD, Saman Gonzales MD, Jennifer Webb MD, Hi Go, DO, Quin Talbot MD, Toby Ramirez, DO, Michaela Tompkins MD,  Johann Galeazzi, DO, Eri Richards MD, Kandice Herman MD, Kt Avelar MD, Tom Roberto MD, Pearl Kuo MD, Federico Alicia MD, Shant Mares [facility], [facility], CNP, Porter Linder, CNP, Chele Torres, CNS, Moris Munoz, CNP, Shira Wasserman, CNP, Talia Aviles, CNP, Esme Dalton, CNP, Pam Shirley, CNP, Itzel Ramirez PA-C, Radha Torres, [facility], Radha Nicole, CNP, Sung Boyd, CNP, Yaima Woody, CNP, Estrella Martinez, CNP, Piedad Carpenter, CNP, Orestes Joseph, CNP, Krystian Barry, CNP, Salvador Garcia, [address] [place] [address]    Progress Note    7/23/2021    7:42 AM    Name:   Dedrick Rivero  MRN:     [id number]     Acct:      [de-identified]   Room:   2022/2022-01   Day:  2  Admit Date:  7/21/2021  3:06 PM    PCP:   Agustin Sal MD  Code Status:  Full Code    Subjective:     C/C:   Chief Complaint   Patient presents with    Back Pain     states chronic back pain becoming worse.  has recently started physical therapy   Abdominal pain     Interval History Status: slightly better  Patient seen and examined at bedside, she is back from her MRI lumbar spine, sleepy as she needed Ativan to tolerate the imaging   Per report in a.m. she was feeling better   For me she is: She is sleepy denies abdominal pain and did not have any tenderness on palpation. Denies passing any gas or bowel movement. Her cough and breathing are slightly better. Neurosurgery eval is in progress  patient denies any chest pain, chills, fevers, nausea or vomiting.   Patient vitals, labs and all providers notes were reviewed,from overnight shift and morning updates were noted and discussed with the nurse      Brief History:     Per chart  This is a 76-year-old female with underlying history of hypertension, COPD, anxiety/depression, UTI, thoracic compression fractures, peripheral neuropathy, migraine headaches, seizure disorder, and alcoholism (last drink 6 months ago) who presents the emergency department with generalized malaise, headache and abdominal/back pain. Initial blood work revealed leukocytosis, elevated alkaline phosphatase, she was tachycardic and hypoxic as well, imaging revealed picture of acute diverticulitis/colitis of left colon without peripheral abscess along with chronic pancreatitis, CT spine revealed stable T7-T9 and T11 compression fracture, worsening of compression fracture of T6 and new T5 and superior L5 subacute fracture. Patient was admitted for further management. Also mentioned rash on her back, from location and morphology seems to be related to heating beds    Review of Systems:     Review of Systems   Constitutional: Positive for activity change, appetite change and fatigue. Negative for chills, diaphoresis and fever. HENT: Negative for congestion. Eyes: Negative for visual disturbance. Respiratory: Positive for cough and shortness of breath. Negative for chest tightness and wheezing. Cardiovascular: Negative for chest pain, palpitations and leg swelling. Gastrointestinal: Positive for abdominal pain. Negative for blood in stool, constipation, diarrhea, nausea and vomiting. Genitourinary: Negative for difficulty urinating. Musculoskeletal: Positive for arthralgias and back pain. Skin: Positive for rash. Neurological: Positive for weakness. Negative for dizziness, light-headedness, numbness and headaches. All other systems reviewed and are negative. Medications:      Allergies:  No Known Allergies    Current Meds:   Scheduled Meds:    tiotropium  2 puff Inhalation Daily    predniSONE  40 mg Oral Daily    levalbuterol  1.25 mg Nebulization Once    amLODIPine  5 mg Oral Daily    baclofen  10 mg Oral TID    budesonide-formoterol  2 puff Inhalation BID    busPIRone  15 mg Oral TID    carBAMazepine  200 mg Oral BID    ferrous sulfate  325 mg Oral BID    FLUoxetine  10 mg Oral Daily    folic acid  1 mg Oral Daily    hydrOXYzine  25 mg Oral Q8H    potassium chloride  20 mEq Oral Daily with breakfast    pregabalin  200 mg Oral TID    rOPINIRole  1 mg Oral Nightly    sodium chloride flush  5-40 mL Intravenous 2 times per day    enoxaparin  40 mg Subcutaneous Daily    piperacillin-tazobactam  3,375 mg Intravenous Q8H    vancomycin (VANCOCIN) intermittent dosing (placeholder)   Other RX Placeholder    vancomycin  1,000 mg Intravenous Q12H     Continuous Infusions:    sodium chloride 100 mL/hr at 07/22/21 1500    sodium chloride       PRN Meds: fentanNYL, bisacodyl, polyethylene glycol, albuterol, traZODone, sodium chloride flush, sodium chloride, acetaminophen **OR** acetaminophen, oxyCODONE-acetaminophen **OR** oxyCODONE-acetaminophen, melatonin    Data:     Past Medical History:   has a past medical history of Acute respiratory failure with hypoxia ([address] Alcohol withdrawal syndrome, with delirium ([address] Alcoholism ([address] Anemia, 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 Social History:   reports that she has been smoking cigarettes. She has a 15.00 pack-year smoking history.  She has never used smokeless tobacco. She reports previous alcohol use. She reports current drug use. Frequency: 2.00 times per week. Drug: Marijuana. Family History:   Family History   Problem Relation Age of Onset    Other Father     Cancer Maternal Grandmother     Heart Disease Paternal Grandmother     Esophageal Cancer Maternal Aunt        Vitals:  /84   Pulse 105   Temp 98.9 °F (37.2 °C) (Oral)   Resp 22   Ht 5' 5\" (1.651 m)   Wt 140 lb (63.5 kg)   SpO2 91%   BMI 23.30 kg/m²   Temp (24hrs), Av.7 °F (37.6 °C), Min:98.3 °F (36.8 °C), Max:101.7 °F (38.7 °C)    Recent Labs     21  1551   POCGLU 141*       I/O (24Hr):     Intake/Output Summary (Last 24 hours) at 2021 0742  Last data filed at 2021 8679  Gross per 24 hour   Intake 1440 ml   Output 500 ml   Net 940 ml       Labs:  Hematology:  Recent Labs     21  1600 21  0322 21  0455 21  0629   WBC 21.5* 22.0*  --  23.0*   RBC 5.08 4.44  --  4.10   HGB 14.9 13.0  --  12.0   HCT 44.3 41.1  --  38.5   MCV 87.2 92.6  --  93.9   MCH 29.3 29.3  --  29.3   MCHC 33.6 31.6  --  31.2   RDW 13.6 13.9  --  13.9   * 372  --  361   MPV 9.7 9.6  --  9.9   CRP  --   --  326.9*  --    INR  --  0.9  --   --      Chemistry:  Recent Labs     21  1600 21  1800 21  0322 21  0455 21  1154 21  0629   *  --  136  --   --  139   K 4.6  --  4.0  --   --  4.5   CL 94*  --  101  --   --  104   CO2 25  --  21  --   --  23   GLUCOSE 128*  --  104*  --   --  131*   BUN 15  --  14  --   --  14   CREATININE 0.41*  --  0.29*  --   --  0.57   MG  --   --  1.7  --   --   --    ANIONGAP 15  -  14  --   --  12   LABGLOM >60  --  >60  --   --  >60   GFRAA >60  --  >60  --   --  >60   CALCIUM 10.3  --  9.1  --   --  8.9   CAION  --   --  1.30  --   --   --    PHOS  --   --   --   --   --  3.7   PROBNP  --   --   --  1,084*  --   --    LACTACIDWB  --  1.1  --   --  0.8  --      Recent Labs     21  1551 21  1600 07/22/21  0322 07/22/21  0455 07/22/21  1154   PROT  --  8.5* 6.8  --   --    LABALBU  --  4.0 3.0*  --   --    AST  --  24 23  --   --    ALT  --  12 9  --   --    ALKPHOS  --  283* 226*  --   --    LABGGT  --   --   --   --  318*   BILITOT  --  0.68 0.46  --   --    AMYLASE  --   --   --  78  --    LIPASE  --   --   --  67*  --    POCGLU 141*  --   --   --   --      ABG:  Lab Results   Component Value Date    POCPH 7.452 10/16/2020    POCPCO2 37.0 10/16/2020    POCPO2 80.7 10/16/2020    POCHCO3 25.9 10/16/2020    NBEA NOT REPORTED 10/16/2020    PBEA 2 10/16/2020    VOJ6ZOJ 27 10/16/2020    IBLL3UGW 96 10/16/2020    FIO2 INFORMATION NOT PROVIDED 07/23/2021     Lab Results   Component Value Date/Time    SPECIAL  R FOREARM 2 ML 07/21/2021 10:56 PM     Lab Results   Component Value Date/Time    CULTURE NO GROWTH 2 DAYS 07/21/2021 10:56 PM       Radiology:  XR ABDOMEN (KUB) (SINGLE AP VIEW)    Result Date: 7/22/2021  Mild dilation of the right hemicolon and patchy small bowel gas most likely due to ileus. XR CHEST PORTABLE    Result Date: 7/22/2021  No radiologic evidence of acute cardiopulmonary disease. CT CHEST ABDOMEN PELVIS W CONTRAST    Result Date: 7/21/2021  Probable acute diverticulitis or colitis left colon. No evidence of perforation or abscess at this time. Chronic pancreatitis. Multiple compression fractures some of which are new since the previous exam. Lingular ground-glass infiltrate. Recommend follow-up to resolution. Intra-atrial lipoma right atrium. Cardiac echo may be helpful. CT LUMBAR SPINE TRAUMA RECONSTRUCTION    Result Date: 7/21/2021  CT thoracic spine: 1. Decrease in height in T6 compared to prior study compatible with worsening compression with subacute etiology and mild protrusion of the dorsal aspect of T6. Narrowed T6-T7 neural foramina. 2.  Chronic superior height T8, T9, and T11 without interval change from prior study.  3.  Mild prevertebral soft tissue prominence T6 without evidence of abscess formation. CT lumbar spine: 1. No traumatic malalignment. 2.  Compression fracture superior L5 with subacute appearance. 3.  Calcifications in the abdomen incompletely included appearance suggesting pancreatic calcifications. RECOMMENDATIONS: Please reference CT chest, abdomen and pelvis of same day. CT THORACIC SPINE TRAUMA RECONSTRUCTION    Result Date: 7/21/2021  CT thoracic spine: 1. Decrease in height in T6 compared to prior study compatible with worsening compression with subacute etiology and mild protrusion of the dorsal aspect of T6. Narrowed T6-T7 neural foramina. 2.  Chronic superior height T8, T9, and T11 without interval change from prior study. 3.  Mild prevertebral soft tissue prominence T6 without evidence of abscess formation. CT lumbar spine: 1. No traumatic malalignment. 2.  Compression fracture superior L5 with subacute appearance. 3.  Calcifications in the abdomen incompletely included appearance suggesting pancreatic calcifications. RECOMMENDATIONS: Please reference CT chest, abdomen and pelvis of same day. Physical Examination:        Physical Exam  Vitals and nursing note reviewed. Constitutional:       General: She is in acute distress. HENT:      Head: Normocephalic and atraumatic. Eyes:      Conjunctiva/sclera: Conjunctivae normal.      Pupils: Pupils are equal, round, and reactive to light. Cardiovascular:      Rate and Rhythm: Normal rate and regular rhythm. Heart sounds: No murmur heard. Pulmonary:      Effort: Pulmonary effort is normal. No accessory muscle usage or respiratory distress. Breath sounds: No stridor. Decreased breath sounds present. No wheezing, rhonchi or rales. Comments: On nasal canula  Abdominal:      General: Bowel sounds are decreased. There is no distension. Palpations: Abdomen is soft. Tenderness:  There is generalized abdominal tenderness and tenderness in the right upper quadrant and epigastric area. There is no guarding. Musculoskeletal:         General: No tenderness. Skin:     General: Skin is warm and dry. Findings: Rash ( Diffuse rash on the back ) present. No erythema or lesion. Neurological:      Mental Status: She is alert and oriented to person, place, and time. Cranial Nerves: No cranial nerve deficit. Motor: No seizure activity. Psychiatric:         Speech: Speech normal.         Behavior: Behavior normal. Behavior is cooperative.          Assessment:        Hospital Problems         Last Modified POA    * (Principal) Sepsis ([address] 7/22/2021 Yes    Acute respiratory failure with hypoxia ([address] 7/22/2021 Yes    Diverticulitis of large intestine without perforation or abscess without bleeding 7/22/2021 Yes    Essential hypertension 7/21/2021 Yes    Tobacco use 7/21/2021 Yes    Seizures ([address] 7/21/2021 Yes    COPD with acute exacerbation ([address] 7/22/2021 Yes    Recurrent major depressive disorder ([address] 7/21/2021 Yes    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24, the patient underwent 2 surgical resections without known recurrence 7/21/2021 Yes    AMAN (generalized anxiety disorder) 7/21/2021 Yes    Chronic peripheral neuropathic pain 7/21/2021 Yes    History of alcohol abuse 7/21/2021 Yes    Personal history of astrocytoma 7/21/2021 Yes    Marijuana abuse 7/21/2021 Yes    Closed fracture of fifth thoracic vertebra ([address] 7/22/2021 Yes    Elevated brain natriuretic peptide (BNP) level 7/22/2021 Yes    Elevated alkaline phosphatase level 7/22/2021 Yes    Chronic pancreatitis ([address] 7/22/2021 Yes    Erythema ab igne 7/22/2021 Yes          Plan:        Sepsis: improving,  Likely secondary to colitis/diverticulitis, discontinue vancomycin and keep Zosyn, cultures are pending: Continue hydration    Colitis/diverticulitis/chronic pancreatitis/constipation/ ileus : bowel regimen per GI  Appreciate GI input    Chronic compression fracture in the thoracic vertebrae, there is worsening and compression fracture at T6 and new fracture at T5 and L5, neurosurgery consultation , evaluation is in progress  Patient denies any trauma or injury or falls  Patient has known osteoporosis     COPD exacerbation: Continue breathing treatments and start oral prednisone    Acute hypoxemic respiratory failure: Likely secondary to sepsis and COPD, continue supplemental oxygen    Elevated BNP: Patient and echocardiogram end of last year with preserved ejection fraction and no significant valvular disease, repeat orders in. Seizure disorder: Follows up with neurology as an outpatient, continue her home medications. History of alcohol use: Per the patient report she is sober for 6 months, mother does report patient did have a drink in March. Elevated alkaline phosphatase: Order  GGT    HTN: continue home medications assessment and plan the patient is so sick    HPL: continue home medications    Depression    Erythema Ab igne : On her back from prolonged use of heating Pads  .   DVT prophylaxis    Patient condition continues to be critical , continue to monitor very closely    Discussed in detail with the patient, her mother and the nurse        Arthur Peoples MD  7/23/2021  7:42 AM

## 2021-07-23 NOTE — PROGRESS NOTES
[facility] ([facility])  Occupational Therapy Not Seen Note    DATE: 2021    NAME: Raji Chatterjee  MRN: [id number]   : 1970      Patient not seen this date for Occupational Therapy due to:    Testing: MRI; await NS recs post results     Next Scheduled Treatment: Ck in pm as able or      Electronically signed by Gaston Rai OT on 2021 at 11:43 AM

## 2021-07-24 ENCOUNTER — APPOINTMENT (OUTPATIENT)
Dept: GENERAL RADIOLOGY | Age: 51
DRG: 720 | End: 2021-07-24
Payer: MEDICARE

## 2021-07-24 LAB
ABSOLUTE EOS #: <0.03 K/UL (ref 0–0.44)
ABSOLUTE IMMATURE GRANULOCYTE: 0.13 K/UL (ref 0–0.3)
ABSOLUTE LYMPH #: 1.89 K/UL (ref 1.1–3.7)
ABSOLUTE MONO #: 1.25 K/UL (ref 0.1–1.2)
ALBUMIN SERPL-MCNC: 2.7 G/DL (ref 3.5–5.2)
ALBUMIN/GLOBULIN RATIO: 0.9 (ref 1–2.5)
ALP BLD-CCNC: 260 U/L (ref 35–104)
ALT SERPL-CCNC: 11 U/L (ref 5–33)
ANION GAP SERPL CALCULATED.3IONS-SCNC: 7 MMOL/L (ref 9–17)
AST SERPL-CCNC: 32 U/L
BASOPHILS # BLD: 0 % (ref 0–2)
BASOPHILS ABSOLUTE: <0.03 K/UL (ref 0–0.2)
BILIRUB SERPL-MCNC: 0.34 MG/DL (ref 0.3–1.2)
BILIRUBIN DIRECT: 0.22 MG/DL
BILIRUBIN, INDIRECT: 0.12 MG/DL (ref 0–1)
BUN BLDV-MCNC: 15 MG/DL (ref 6–20)
BUN/CREAT BLD: ABNORMAL (ref 9–20)
CALCIUM SERPL-MCNC: 8.7 MG/DL (ref 8.6–10.4)
CHLORIDE BLD-SCNC: 102 MMOL/L (ref 98–107)
CO2: 25 MMOL/L (ref 20–31)
CREAT SERPL-MCNC: 0.69 MG/DL (ref 0.5–0.9)
DIFFERENTIAL TYPE: ABNORMAL
EOSINOPHILS RELATIVE PERCENT: 0 % (ref 1–4)
GFR AFRICAN AMERICAN: >60 ML/MIN
GFR NON-AFRICAN AMERICAN: >60 ML/MIN
GFR SERPL CREATININE-BSD FRML MDRD: ABNORMAL ML/MIN/{1.73_M2}
GFR SERPL CREATININE-BSD FRML MDRD: ABNORMAL ML/MIN/{1.73_M2}
GLOBULIN: ABNORMAL G/DL (ref 1.5–3.8)
GLUCOSE BLD-MCNC: 115 MG/DL (ref 70–99)
HCT VFR BLD CALC: 34.6 % (ref 36.3–47.1)
HCT VFR BLD CALC: 36.5 % (ref 36.3–47.1)
HEMOGLOBIN: 11.1 G/DL (ref 11.9–15.1)
HEMOGLOBIN: 12 G/DL (ref 11.9–15.1)
IMMATURE GRANULOCYTES: 1 %
LACTIC ACID, SEPSIS WHOLE BLOOD: 0.9 MMOL/L (ref 0.5–1.9)
LACTIC ACID, SEPSIS: NORMAL MMOL/L (ref 0.5–1.9)
LYMPHOCYTES # BLD: 9 % (ref 24–43)
MCH RBC QN AUTO: 29.7 PG (ref 25.2–33.5)
MCH RBC QN AUTO: 29.9 PG (ref 25.2–33.5)
MCHC RBC AUTO-ENTMCNC: 32.1 G/DL (ref 28.4–34.8)
MCHC RBC AUTO-ENTMCNC: 32.9 G/DL (ref 28.4–34.8)
MCV RBC AUTO: 90.8 FL (ref 82.6–102.9)
MCV RBC AUTO: 92.5 FL (ref 82.6–102.9)
MONOCYTES # BLD: 6 % (ref 3–12)
NRBC AUTOMATED: 0 PER 100 WBC
NRBC AUTOMATED: 0 PER 100 WBC
PDW BLD-RTO: 13.5 % (ref 11.8–14.4)
PDW BLD-RTO: 13.8 % (ref 11.8–14.4)
PLATELET # BLD: 343 K/UL (ref 138–453)
PLATELET # BLD: 394 K/UL (ref 138–453)
PLATELET ESTIMATE: ABNORMAL
PMV BLD AUTO: 9.5 FL (ref 8.1–13.5)
PMV BLD AUTO: 9.7 FL (ref 8.1–13.5)
POTASSIUM SERPL-SCNC: 4.2 MMOL/L (ref 3.7–5.3)
RBC # BLD: 3.74 M/UL (ref 3.95–5.11)
RBC # BLD: 4.02 M/UL (ref 3.95–5.11)
RBC # BLD: ABNORMAL 10*6/UL
SEG NEUTROPHILS: 84 % (ref 36–65)
SEGMENTED NEUTROPHILS ABSOLUTE COUNT: 17.26 K/UL (ref 1.5–8.1)
SODIUM BLD-SCNC: 134 MMOL/L (ref 135–144)
TOTAL PROTEIN: 5.8 G/DL (ref 6.4–8.3)
WBC # BLD: 18.5 K/UL (ref 3.5–11.3)
WBC # BLD: 20.6 K/UL (ref 3.5–11.3)
WBC # BLD: ABNORMAL 10*3/UL

## 2021-07-24 PROCEDURE — 2700000000 HC OXYGEN THERAPY PER DAY

## 2021-07-24 PROCEDURE — 6360000002 HC RX W HCPCS: Performed by: FAMILY MEDICINE

## 2021-07-24 PROCEDURE — 2060000000 HC ICU INTERMEDIATE R&B

## 2021-07-24 PROCEDURE — 6360000002 HC RX W HCPCS: Performed by: NURSE PRACTITIONER

## 2021-07-24 PROCEDURE — 6370000000 HC RX 637 (ALT 250 FOR IP): Performed by: FAMILY MEDICINE

## 2021-07-24 PROCEDURE — 85025 COMPLETE CBC W/AUTO DIFF WBC: CPT

## 2021-07-24 PROCEDURE — 99232 SBSQ HOSP IP/OBS MODERATE 35: CPT | Performed by: NEUROLOGICAL SURGERY

## 2021-07-24 PROCEDURE — 80076 HEPATIC FUNCTION PANEL: CPT

## 2021-07-24 PROCEDURE — 6370000000 HC RX 637 (ALT 250 FOR IP): Performed by: NURSE PRACTITIONER

## 2021-07-24 PROCEDURE — 2500000003 HC RX 250 WO HCPCS: Performed by: NURSE PRACTITIONER

## 2021-07-24 PROCEDURE — 36415 COLL VENOUS BLD VENIPUNCTURE: CPT

## 2021-07-24 PROCEDURE — 87040 BLOOD CULTURE FOR BACTERIA: CPT

## 2021-07-24 PROCEDURE — 94660 CPAP INITIATION&MGMT: CPT

## 2021-07-24 PROCEDURE — 80048 BASIC METABOLIC PNL TOTAL CA: CPT

## 2021-07-24 PROCEDURE — 2580000003 HC RX 258: Performed by: NURSE PRACTITIONER

## 2021-07-24 PROCEDURE — 83605 ASSAY OF LACTIC ACID: CPT

## 2021-07-24 PROCEDURE — 74022 RADEX COMPL AQT ABD SERIES: CPT

## 2021-07-24 PROCEDURE — 94761 N-INVAS EAR/PLS OXIMETRY MLT: CPT

## 2021-07-24 PROCEDURE — 99233 SBSQ HOSP IP/OBS HIGH 50: CPT | Performed by: FAMILY MEDICINE

## 2021-07-24 PROCEDURE — 85027 COMPLETE CBC AUTOMATED: CPT

## 2021-07-24 PROCEDURE — 94640 AIRWAY INHALATION TREATMENT: CPT

## 2021-07-24 RX ORDER — FUROSEMIDE 10 MG/ML
40 INJECTION INTRAMUSCULAR; INTRAVENOUS ONCE
Status: COMPLETED | OUTPATIENT
Start: 2021-07-24 | End: 2021-07-24

## 2021-07-24 RX ORDER — LORAZEPAM 0.5 MG/1
0.5 TABLET ORAL ONCE
Status: COMPLETED | OUTPATIENT
Start: 2021-07-24 | End: 2021-07-24

## 2021-07-24 RX ORDER — METOPROLOL TARTRATE 5 MG/5ML
5 INJECTION INTRAVENOUS ONCE
Status: COMPLETED | OUTPATIENT
Start: 2021-07-24 | End: 2021-07-24

## 2021-07-24 RX ORDER — LEVETIRACETAM 500 MG/1
1000 TABLET ORAL ONCE
Status: COMPLETED | OUTPATIENT
Start: 2021-07-24 | End: 2021-07-24

## 2021-07-24 RX ORDER — TOPIRAMATE 25 MG/1
50 TABLET ORAL 2 TIMES DAILY
Status: DISCONTINUED | OUTPATIENT
Start: 2021-07-24 | End: 2021-08-03 | Stop reason: HOSPADM

## 2021-07-24 RX ORDER — METHYLPREDNISOLONE SODIUM SUCCINATE 40 MG/ML
40 INJECTION, POWDER, LYOPHILIZED, FOR SOLUTION INTRAMUSCULAR; INTRAVENOUS EVERY 12 HOURS
Status: DISCONTINUED | OUTPATIENT
Start: 2021-07-24 | End: 2021-07-27

## 2021-07-24 RX ADMIN — PREGABALIN 200 MG: 100 CAPSULE ORAL at 08:43

## 2021-07-24 RX ADMIN — SODIUM CHLORIDE, PRESERVATIVE FREE 10 ML: 5 INJECTION INTRAVENOUS at 21:16

## 2021-07-24 RX ADMIN — HYDROXYZINE HYDROCHLORIDE 25 MG: 25 TABLET ORAL at 10:26

## 2021-07-24 RX ADMIN — METOPROLOL TARTRATE 5 MG: 1 INJECTION, SOLUTION INTRAVENOUS at 22:51

## 2021-07-24 RX ADMIN — BUSPIRONE HYDROCHLORIDE 15 MG: 15 TABLET ORAL at 08:43

## 2021-07-24 RX ADMIN — BACLOFEN 10 MG: 10 TABLET ORAL at 08:43

## 2021-07-24 RX ADMIN — METHYLPREDNISOLONE SODIUM SUCCINATE 40 MG: 40 INJECTION, POWDER, FOR SOLUTION INTRAMUSCULAR; INTRAVENOUS at 21:15

## 2021-07-24 RX ADMIN — POTASSIUM CHLORIDE 20 MEQ: 1500 TABLET, EXTENDED RELEASE ORAL at 08:43

## 2021-07-24 RX ADMIN — PIPERACILLIN AND TAZOBACTAM 3375 MG: 3; .375 INJECTION, POWDER, FOR SOLUTION INTRAVENOUS at 10:28

## 2021-07-24 RX ADMIN — FOLIC ACID 1 MG: 1 TABLET ORAL at 08:43

## 2021-07-24 RX ADMIN — TOPIRAMATE 50 MG: 25 TABLET, FILM COATED ORAL at 21:15

## 2021-07-24 RX ADMIN — FENTANYL CITRATE 50 MCG: 50 INJECTION, SOLUTION INTRAMUSCULAR; INTRAVENOUS at 10:24

## 2021-07-24 RX ADMIN — SODIUM CHLORIDE 25 ML: 9 INJECTION, SOLUTION INTRAVENOUS at 18:03

## 2021-07-24 RX ADMIN — TIOTROPIUM BROMIDE INHALATION SPRAY 2 PUFF: 3.12 SPRAY, METERED RESPIRATORY (INHALATION) at 08:40

## 2021-07-24 RX ADMIN — OXYCODONE HYDROCHLORIDE AND ACETAMINOPHEN 1 TABLET: 5; 325 TABLET ORAL at 02:54

## 2021-07-24 RX ADMIN — PREGABALIN 200 MG: 100 CAPSULE ORAL at 15:10

## 2021-07-24 RX ADMIN — CARBAMAZEPINE 200 MG: 100 TABLET, EXTENDED RELEASE ORAL at 08:43

## 2021-07-24 RX ADMIN — CARBAMAZEPINE 200 MG: 100 TABLET, EXTENDED RELEASE ORAL at 21:16

## 2021-07-24 RX ADMIN — BACLOFEN 10 MG: 10 TABLET ORAL at 15:10

## 2021-07-24 RX ADMIN — FERROUS SULFATE TAB EC 325 MG (65 MG FE EQUIVALENT) 325 MG: 325 (65 FE) TABLET DELAYED RESPONSE at 08:43

## 2021-07-24 RX ADMIN — AMLODIPINE BESYLATE 5 MG: 5 TABLET ORAL at 08:43

## 2021-07-24 RX ADMIN — LEVETIRACETAM 1000 MG: 500 TABLET, FILM COATED ORAL at 15:10

## 2021-07-24 RX ADMIN — HYOSCYAMINE SULFATE 125 MCG: 0.12 TABLET ORAL; SUBLINGUAL at 15:14

## 2021-07-24 RX ADMIN — PREGABALIN 200 MG: 100 CAPSULE ORAL at 21:16

## 2021-07-24 RX ADMIN — BUSPIRONE HYDROCHLORIDE 15 MG: 15 TABLET ORAL at 15:10

## 2021-07-24 RX ADMIN — SODIUM CHLORIDE, PRESERVATIVE FREE 10 ML: 5 INJECTION INTRAVENOUS at 08:43

## 2021-07-24 RX ADMIN — ROPINIROLE HYDROCHLORIDE 1 MG: 1 TABLET, FILM COATED ORAL at 21:16

## 2021-07-24 RX ADMIN — FUROSEMIDE 40 MG: 10 INJECTION, SOLUTION INTRAMUSCULAR; INTRAVENOUS at 13:10

## 2021-07-24 RX ADMIN — FUROSEMIDE 40 MG: 10 INJECTION, SOLUTION INTRAMUSCULAR; INTRAVENOUS at 18:09

## 2021-07-24 RX ADMIN — LORAZEPAM 0.5 MG: 0.5 TABLET ORAL at 18:53

## 2021-07-24 RX ADMIN — HYOSCYAMINE SULFATE 125 MCG: 0.12 TABLET ORAL; SUBLINGUAL at 05:03

## 2021-07-24 RX ADMIN — BUSPIRONE HYDROCHLORIDE 15 MG: 15 TABLET ORAL at 21:16

## 2021-07-24 RX ADMIN — FERROUS SULFATE TAB EC 325 MG (65 MG FE EQUIVALENT) 325 MG: 325 (65 FE) TABLET DELAYED RESPONSE at 21:16

## 2021-07-24 RX ADMIN — ENOXAPARIN SODIUM 40 MG: 40 INJECTION SUBCUTANEOUS at 08:44

## 2021-07-24 RX ADMIN — METHYLPREDNISOLONE SODIUM SUCCINATE 40 MG: 40 INJECTION, POWDER, FOR SOLUTION INTRAMUSCULAR; INTRAVENOUS at 10:27

## 2021-07-24 RX ADMIN — ALBUTEROL SULFATE 2.5 MG: 2.5 SOLUTION RESPIRATORY (INHALATION) at 12:44

## 2021-07-24 RX ADMIN — ALBUTEROL SULFATE 2.5 MG: 2.5 SOLUTION RESPIRATORY (INHALATION) at 08:40

## 2021-07-24 RX ADMIN — BACLOFEN 10 MG: 10 TABLET ORAL at 21:16

## 2021-07-24 RX ADMIN — TOPIRAMATE 50 MG: 25 TABLET, FILM COATED ORAL at 15:12

## 2021-07-24 RX ADMIN — BUDESONIDE AND FORMOTEROL FUMARATE DIHYDRATE 2 PUFF: 160; 4.5 AEROSOL RESPIRATORY (INHALATION) at 08:40

## 2021-07-24 RX ADMIN — PIPERACILLIN AND TAZOBACTAM 3375 MG: 3; .375 INJECTION, POWDER, FOR SOLUTION INTRAVENOUS at 02:49

## 2021-07-24 RX ADMIN — PIPERACILLIN AND TAZOBACTAM 3375 MG: 3; .375 INJECTION, POWDER, FOR SOLUTION INTRAVENOUS at 18:01

## 2021-07-24 ASSESSMENT — ENCOUNTER SYMPTOMS
GASTROINTESTINAL NEGATIVE: 1
DIARRHEA: 0
WHEEZING: 0
COUGH: 1
NAUSEA: 0
ABDOMINAL PAIN: 1
CHEST TIGHTNESS: 0
BLOOD IN STOOL: 0
BACK PAIN: 1
SHORTNESS OF BREATH: 1
VOMITING: 0
CONSTIPATION: 0

## 2021-07-24 ASSESSMENT — PAIN SCALES - GENERAL
PAINLEVEL_OUTOF10: 10
PAINLEVEL_OUTOF10: 6
PAINLEVEL_OUTOF10: 6

## 2021-07-24 ASSESSMENT — PAIN DESCRIPTION - FREQUENCY: FREQUENCY: CONTINUOUS

## 2021-07-24 NOTE — CONSULTS
Neurology Inpatient Consult Note      CONSULTED BY:  Dr. Corky Alfonso: Seizures  CHIEF COMPLAINT: Seizures reported by patient on 7/24/21 night    History Obtained From:  patient       HISTORY OF PRESENT ILLNESS:              The patient is a 45 y.o. female with significant past medical history of hypertension, COPD, anxiety/depression, UTI, thoracic compression fractures, peripheral neuropathy, migraines, headaches, seizure disorder and alcoholism. Patient was presented to the ED with generalized malaise, headache and abdominal pain on 07/22/21. Patient states that last night [7/23/2021] she had gone through seizure like symptoms for around 15 seconds where she felt like her body was stiff and was not able to move. She is a recovering alcoholic. This is the first time she has ever had this kind of seizure. She states that her last alcohol was back in December 2020. She smokes 1 pack a day.     Past Medical History:        Diagnosis Date    Acute respiratory failure with hypoxia ([address] 10/16/2020    Alcohol withdrawal syndrome, with delirium ([address] 12/14/2019    Alcoholism ([address]     Anemia 10/2020    GI bleed    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24, the patient underwent 2 surgical resections without known recurrence 10/23/2020    Closed fracture of lateral portion of left tibial plateau 05/70/6548    COPD (chronic obstructive pulmonary disease) ([address]     CO2 retainer, on Bipap at night for this, Dr. Farhan Fry ( last visit 11/20/2020 and note on chart )    Depression     bipolar, major depressive disorder, ptsd, anxiety    Dysphagia     GI bleed 10/2020    Hypertension     Memory loss     Oxygen dependent     pt stated not needed as of 12/9/2020    Pain, joint, ankle and foot     Pancreatic lesion 10/2020    Dr. Velázquez Read working up pt    Peripheral neuropathy     Seizures ([address]     also baseline tremors-last sz summer 2020    Tension headache     Under care of team 07/01/2021 neuro-Dr Wan-[facility]-last visit june 2021    Under care of team 07/01/2021    pain management-Hamzah Martines-[facility]-last visit june 2021    Under care of team 07/01/2021    pulmonology-Dr Dueñas-[facility]-last virtual visit feb 2021    Under care of team 07/01/2021    psych-bahnfeldt NP-telemed-last visit may 2021    Under care of team 07/01/2021    or-[address]-last visit june 2021    Wellness examination 07/01/2021    pcp-[address]-last visit may 2021     Past Surgical History:        Procedure Laterality Date    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    COLONOSCOPY N/A 10/22/2020    COLONOSCOPY DIAGNOSTIC performed by Rowdy Sagastume MD at [facility] [address] (LOWER) N/A 12/9/2020    ENDOSCOPIC ULTRASOUND, UPPER WITH LINEAR SCOPE FOR BIOPSY OF MASS ON HEAD OF PANCREAS performed by Gian Melchor MD at [address] Left 7-3-13    ORIF tibial plateau    FRACTURE SURGERY Right     small finger metacarpal fracture    HAND SURGERY      pins    HYSTERECTOMY  2003    UPPER GASTROINTESTINAL ENDOSCOPY N/A 10/22/2020    EGD BIOPSY performed by Rowdy Sagsatume MD at [address] [place] [address] N/A 4/5/2021    EGD BIOPSY performed by Rowdy Sagastume MD at [facility]     Current Medications:   Current Facility-Administered Medications: hyoscyamine (LEVSIN/SL) sublingual tablet 125 mcg, 125 mcg, Oral, Q4H PRN  methylPREDNISolone sodium (SOLU-MEDROL) injection 40 mg, 40 mg, Intravenous, Q12H  levETIRAcetam (KEPPRA) tablet 1,000 mg, 1,000 mg, Oral, Once  topiramate (TOPAMAX) tablet 50 mg, 50 mg, Oral, BID  hydrOXYzine (ATARAX) tablet 25 mg, 25 mg, Oral, TID PRN  fentaNYL (SUBLIMAZE) injection 50 mcg, 50 mcg, Intravenous, Q3H PRN  tiotropium (SPIRIVA RESPIMAT) 2.5 MCG/ACT inhaler 2 puff, 2 puff, Inhalation, Daily  bisacodyl (DULCOLAX) EC tablet 5 mg, 5 mg, Oral, Daily PRN  polyethylene glycol (GLYCOLAX) packet 17 g, 17 g, Oral, TID PRN  albuterol (PROVENTIL) nebulizer solution 2.5 mg, 2.5 mg, Nebulization, As Directed RT PRN  levalbuterol (XOPENEX) nebulizer solution 1.25 mg, 1.25 mg, Nebulization, Once  amLODIPine (NORVASC) tablet 5 mg, 5 mg, Oral, Daily  baclofen (LIORESAL) tablet 10 mg, 10 mg, Oral, TID  budesonide-formoterol (SYMBICORT) 160-4.5 MCG/ACT inhaler 2 puff, 2 puff, Inhalation, BID  busPIRone (BUSPAR) tablet 15 mg, 15 mg, Oral, TID  carBAMazepine (TEGRETOL XR) extended release tablet 200 mg, 200 mg, Oral, BID  ferrous sulfate (FE TABS 325) EC tablet 325 mg, 325 mg, Oral, BID  folic acid (FOLVITE) tablet 1 mg, 1 mg, Oral, Daily  potassium chloride (KLOR-CON M) extended release tablet 20 mEq, 20 mEq, Oral, Daily with breakfast  pregabalin (LYRICA) capsule 200 mg, 200 mg, Oral, TID  rOPINIRole (REQUIP) tablet 1 mg, 1 mg, Oral, Nightly  traZODone (DESYREL) tablet 50 mg, 50 mg, Oral, Nightly PRN  sodium chloride flush 0.9 % injection 5-40 mL, 5-40 mL, Intravenous, 2 times per day  sodium chloride flush 0.9 % injection 5-40 mL, 5-40 mL, Intravenous, PRN  0.9 % sodium chloride infusion, 25 mL, Intravenous, PRN  enoxaparin (LOVENOX) injection 40 mg, 40 mg, Subcutaneous, Daily  acetaminophen (TYLENOL) tablet 650 mg, 650 mg, Oral, Q6H PRN **OR** acetaminophen (TYLENOL) suppository 650 mg, 650 mg, Rectal, Q6H PRN  piperacillin-tazobactam (ZOSYN) 3,375 mg in dextrose 5 % 50 mL IVPB extended infusion (mini-bag), 3,375 mg, Intravenous, Q8H  [Held by provider] oxyCODONE-acetaminophen (PERCOCET) 5-325 MG per tablet 1 tablet, 1 tablet, Oral, Q4H PRN **OR** [DISCONTINUED] oxyCODONE-acetaminophen (PERCOCET) 5-325 MG per tablet 2 tablet, 2 tablet, Oral, Q4H PRN  melatonin tablet 3 mg, 3 mg, Oral, Nightly PRN  Allergies:  Patient has no known allergies. Social History:  TOBACCO:   reports that she has been smoking cigarettes. She has a 15.00 pack-year smoking history.  She has never used smokeless tobacco.  ETOH:   reports previous alcohol use. DRUGS:   reports current drug use. Frequency: 2.00 times per week. Drug: Marijuana. ACTIVITIES OF DAILY LIVING:  Patient is able to perform all activities of daily living. INSTRUMENTAL ACTIVITIES OF DAILY LIVING:  Patient is able to perform all instrumental activities of daily living. Family History:       Problem Relation Age of Onset    Other Father     Cancer Maternal Grandmother     Heart Disease Paternal Grandmother     Esophageal Cancer Maternal Aunt        REVIEW OF SYSTEMS:  Review of Systems   Cardiovascular: Negative. Gastrointestinal: Negative. Genitourinary: Negative. Musculoskeletal: Negative. Neurological: Negative. Psychiatric/Behavioral: Negative. PHYSICAL EXAM:  Vitals:  /88   Pulse 98   Temp 99.1 °F (37.3 °C)   Resp 21   Ht 5' 5\" (1.651 m)   Wt 143 lb 4.8 oz (65 kg)   SpO2 90%   BMI 23.85 kg/m²    Physical Exam  Constitutional:       Appearance: Normal appearance. HENT:      Head: Normocephalic and atraumatic. Neurological:      Mental Status: She is alert. Cranial Nerves: Cranial nerves are intact. Sensory: Sensation is intact. Motor: Motor function is intact. Deep Tendon Reflexes:      Reflex Scores:       Tricep reflexes are 2+ on the right side and 2+ on the left side. Bicep reflexes are 2+ on the right side and 2+ on the left side. Brachioradialis reflexes are 2+ on the right side and 2+ on the left side. Patellar reflexes are 2+ on the right side and 2+ on the left side. Achilles reflexes are 2+ on the right side and 2+ on the left side.          DATA  Recent Results (from the past 24 hour(s))   CBC Auto Differential    Collection Time: 07/24/21  5:49 AM   Result Value Ref Range    WBC 20.6 (H) 3.5 - 11.3 k/uL    RBC 3.74 (L) 3.95 - 5.11 m/uL    Hemoglobin 11.1 (L) 11.9 - 15.1 g/dL    Hematocrit 34.6 (L) 36.3 - 47.1 %    MCV 92.5 82.6 - 102.9 fL    MCH 29.7 25.2 - 33.5 pg    MCHC 32.1 28.4 - 34.8 g/dL    RDW 13.8 11.8 - 14.4 %    Platelets 808 003 - 716 k/uL    MPV 9.7 8.1 - 13.5 fL    NRBC Automated 0.0 0.0 per 100 WBC    Differential Type NOT REPORTED     Seg Neutrophils 84 (H) 36 - 65 %    Lymphocytes 9 (L) 24 - 43 %    Monocytes 6 3 - 12 %    Eosinophils % 0 (L) 1 - 4 %    Basophils 0 0 - 2 %    Immature Granulocytes 1 (H) 0 %    Segs Absolute 17.26 (H) 1.50 - 8.10 k/uL    Absolute Lymph # 1.89 1.10 - 3.70 k/uL    Absolute Mono # 1.25 (H) 0.10 - 1.20 k/uL    Absolute Eos # <0.03 0.00 - 0.44 k/uL    Basophils Absolute <0.03 0.00 - 0.20 k/uL    Absolute Immature Granulocyte 0.13 0.00 - 0.30 k/uL    WBC Morphology NOT REPORTED     RBC Morphology NOT REPORTED     Platelet Estimate NOT REPORTED    Basic Metabolic Panel    Collection Time: 07/24/21  5:49 AM   Result Value Ref Range    Glucose 115 (H) 70 - 99 mg/dL    BUN 15 6 - 20 mg/dL    CREATININE 0.69 0.50 - 0.90 mg/dL    Bun/Cre Ratio NOT REPORTED 9 - 20    Calcium 8.7 8.6 - 10.4 mg/dL    Sodium 134 (L) 135 - 144 mmol/L    Potassium 4.2 3.7 - 5.3 mmol/L    Chloride 102 98 - 107 mmol/L    CO2 25 20 - 31 mmol/L    Anion Gap 7 (L) 9 - 17 mmol/L    GFR Non-African American >60 >60 mL/min    GFR African American >60 >60 mL/min    GFR Comment          GFR Staging NOT REPORTED    Hepatic Function Panel    Collection Time: 07/24/21  5:49 AM   Result Value Ref Range    Albumin 2.7 (L) 3.5 - 5.2 g/dL    Alkaline Phosphatase 260 (H) 35 - 104 U/L    ALT 11 5 - 33 U/L    AST 32 (H) <32 U/L    Total Bilirubin 0.34 0.3 - 1.2 mg/dL    Bilirubin, Direct 0.22 <0.31 mg/dL    Bilirubin, Indirect 0.12 0.00 - 1.00 mg/dL    Total Protein 5.8 (L) 6.4 - 8.3 g/dL    Globulin NOT REPORTED 1.5 - 3.8 g/dL    Albumin/Globulin Ratio 0.9 (L) 1.0 - 2.5       IMAGING  MRI thoracic spine w/o contrast  Impression   1. Limited examination.    2. Compression deformities involving T6, T8, T9 and T11.  These are likely   chronic in nature.  Diffusely decreased T1 and T2 marrow signal within the T6   vertebral body compatible with the sclerosis seen on the prior CT. 3. There is minimal bone marrow edema involving the left lateral elements of   T6 and T7.  Unclear whether this is degenerative in nature or perhaps   sequelae of recent or remote trauma. 4. No significant spinal canal stenosis of the thoracic spine. 5. Moderate bilateral neural foraminal narrowing at T6-T7.   6. Bilateral pleural effusions, left greater than right. MRI Lumbar spine w/o contrast  Impression   1. Acute compression deformity of the superior endplate of L5 with   approximately 35% loss of height.  No significant bulging of the posterior   cortex. 2. No significant spinal canal stenosis of the lumbar spine.    3. Neural foraminal narrowing at L3-L4 through L5-S1.   4. Minimal retrolisthesis at L5-S1.         RECOMMENDATIONS:         Berhane Vera PGY 1  Neurology Service  7/24/2021 at 2:25 PM

## 2021-07-24 NOTE — PROGRESS NOTES
[facility]  Office: [address], DO, Jaime Rossi, DO, Loyda Jackie, DO, Domingoterrance Avendano Blood, DO, Fady Logan MD, Valery De La Cruz MD, An Lee MD, Liu Ellington MD, Barbie Lott MD, Queen Tim MD, Alphonso Klinefelter, MD, Antonio Grove, DO, Connie Pearson MD, Rudolph Juarez, DO, Angelito Spears MD,  Alana Benedict, DO, Malathi Connors MD, Malu Pelaez MD, Fabiola Woo MD, Kodak Mack MD, Racquel Rain MD, Chun Rey MD, Raven Khan, [facility], [facility], CNP, Rc Oh, CNP, Ciara Rocha, CNS, Zion Corea, CNP, George Purdy, CNP, Bart Nixon, CNP, Farnaz Bowen, CNP, Miguel Tavarez, CNP, NICOLLE RickC, Cleopatra Torres, [facility], Isai Lucia, CNP, Matheus Priest, CNP, Kary Tan, CNP, Itz Dsouza, CNP, Mary Brown, CNP, Sara Lorenzo, CNP, Dori Hoang, CNP, Melania Ceron, [address] [place] [address]    Progress Note    7/24/2021    7:50 AM    Name:   Paris Hahn  MRN:     [id number]     Acct:      [de-identified]   Room:   2022/2022-01   Day:  3  Admit Date:  7/21/2021  3:06 PM    PCP:   Jody Gonsalez MD  Code Status:  Full Code    Subjective:     C/C:   Chief Complaint   Patient presents with    Back Pain     states chronic back pain becoming worse.  has recently started physical therapy   Abdominal pain     Interval History Status: worse  Patient seen and examined at bedside, she is breathing slightly harder today , she is now on NRM, still reports that she does not pass flatus and no BM  Intermittent back pain. Abdominal pain is better. patient denies any chest pain, chills, fevers, nausea or vomiting.   Patient vitals, labs and all providers notes were reviewed,from overnight shift and morning updates were noted and discussed with the nurse    Brief History:     Per chart  This is a 59-year-old female with underlying history of hypertension, COPD, anxiety/depression, UTI, thoracic compression fractures, peripheral neuropathy, migraine headaches, seizure disorder, and alcoholism (last drink 6 months ago) who presents the emergency department with generalized malaise, headache and abdominal/back pain. Initial blood work revealed leukocytosis, elevated alkaline phosphatase, she was tachycardic and hypoxic as well, imaging revealed picture of acute diverticulitis/colitis of left colon without peripheral abscess along with chronic pancreatitis, CT spine revealed stable T7-T9 and T11 compression fracture, worsening of compression fracture of T6 and new T5 and superior L5 subacute fracture. Patient was admitted for further management. Also mentioned rash on her back, from location and morphology seems to be related to heating beds    Review of Systems:     Review of Systems   Constitutional: Positive for activity change, appetite change and fatigue. Negative for chills, diaphoresis and fever. HENT: Negative for congestion. Eyes: Negative for visual disturbance. Respiratory: Positive for cough and shortness of breath. Negative for chest tightness and wheezing. Cardiovascular: Negative for chest pain, palpitations and leg swelling. Gastrointestinal: Positive for abdominal pain. Negative for blood in stool, constipation, diarrhea, nausea and vomiting. Genitourinary: Negative for difficulty urinating. Musculoskeletal: Positive for arthralgias and back pain. Skin: Positive for rash. Neurological: Positive for weakness. Negative for dizziness, light-headedness, numbness and headaches. All other systems reviewed and are negative. Medications:      Allergies:  No Known Allergies    Current Meds:   Scheduled Meds:    tiotropium  2 puff Inhalation Daily    predniSONE  40 mg Oral Daily    levalbuterol  1.25 mg Nebulization Once    amLODIPine  5 mg Oral Daily    baclofen  10 mg Oral TID    budesonide-formoterol  2 puff Inhalation BID    busPIRone  15 mg Oral TID   Paredes carBAMazepine  200 mg Oral BID    ferrous sulfate  325 mg Oral BID    FLUoxetine  10 mg Oral Daily    folic acid  1 mg Oral Daily    potassium chloride  20 mEq Oral Daily with breakfast    pregabalin  200 mg Oral TID    rOPINIRole  1 mg Oral Nightly    sodium chloride flush  5-40 mL Intravenous 2 times per day    enoxaparin  40 mg Subcutaneous Daily    piperacillin-tazobactam  3,375 mg Intravenous Q8H     Continuous Infusions:    sodium chloride 100 mL/hr at 07/22/21 1500    sodium chloride       PRN Meds: hyoscyamine, hydrOXYzine, fentanNYL, bisacodyl, polyethylene glycol, albuterol, traZODone, sodium chloride flush, sodium chloride, acetaminophen **OR** acetaminophen, oxyCODONE-acetaminophen **OR** [DISCONTINUED] oxyCODONE-acetaminophen, melatonin    Data:     Past Medical History:   has a past medical history of Acute respiratory failure with hypoxia ([facility] [address] Alcohol withdrawal syndrome, with delirium ([address] Alcoholism ([address] Anemia, Astrocytoma ([facility] [address] - 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 Social History:   reports that she has been smoking cigarettes. She has a 15.00 pack-year smoking history. She has never used smokeless tobacco. She reports previous alcohol use. She reports current drug use. Frequency: 2.00 times per week. Drug: Marijuana.      Family History:   Family History   Problem Relation Age of Onset    Other Father     Cancer Maternal Grandmother     Heart Disease Paternal Grandmother     Esophageal Cancer Maternal Aunt Vitals:  /89   Pulse 98   Temp 98.2 °F (36.8 °C) (Oral)   Resp (!) 31   Ht 5' 5\" (1.651 m)   Wt 143 lb 4.8 oz (65 kg)   SpO2 (!) 89%   BMI 23.85 kg/m²   Temp (24hrs), Av.2 °F (36.8 °C), Min:97.9 °F (36.6 °C), Max:98.6 °F (37 °C)    Recent Labs     21  1551   POCGLU 141*       I/O (24Hr): Intake/Output Summary (Last 24 hours) at 2021 0750  Last data filed at 2021 0505  Gross per 24 hour   Intake 4048 ml   Output 850 ml   Net 3198 ml       Labs:  Hematology:  Recent Labs     21  0322 21  0455 21  0629 21  0549   WBC 22.0*  --  23.0* 20.6*   RBC 4.44  --  4.10 3.74*   HGB 13.0  --  12.0 11.1*   HCT 41.1  --  38.5 34.6*   MCV 92.6  --  93.9 92.5   MCH 29.3  --  29.3 29.7   MCHC 31.6  --  31.2 32.1   RDW 13.9  --  13.9 13.8     --  361 343   MPV 9.6  --  9.9 9.7   CRP  --  326.9*  --   --    INR 0.9  --   --   --      Chemistry:  Recent Labs     21  1600 21  1800 21  0322 21  0455 21  1154 21  0629 21  0549   NA   < >  --  136  --   --  139 134*   K   < >  --  4.0  --   --  4.5 4.2   CL   < >  --  101  --   --  104 102   CO2   < >  --  21  --   --  23 25   GLUCOSE   < >  --  104*  --   --  131* 115*   BUN   < >  --  14  --   --  14 15   CREATININE   < >  --  0.29*  --   --  0.57 0.69   MG  --   --  1.7  --   --   --   --    ANIONGAP   < >  --  14  --   --  12 7*   LABGLOM   < >  --  >60  --   --  >60 >60   GFRAA   < >  --  >60  --   --  >60 >60   CALCIUM   < >  --  9.1  --   --  8.9 8.7   CAION  --   --  1.30  --   --   --   --    PHOS  --   --   --   --   --  3.7  --    PROBNP  --   --   --  1,084*  --   --   --    LACTACIDWB  --  1.1  --   --  0.8  --   --     < > = values in this interval not displayed.      Recent Labs     21  1551 21  1600 21  0322 21  0455 21  1154   PROT  --  8.5* 6.8  --   --    LABALBU  --  4.0 3.0*  --   --    AST  --  24 23  --   --    ALT  --  12 9 --   --    ALKPHOS  --  283* 226*  --   --    LABGGT  --   --   --   --  318*   BILITOT  --  0.68 0.46  --   --    AMYLASE  --   --   --  78  --    LIPASE  --   --   --  67*  --    POCGLU 141*  --   --   --   --      ABG:  Lab Results   Component Value Date    POCPH 7.452 10/16/2020    POCPCO2 37.0 10/16/2020    POCPO2 80.7 10/16/2020    POCHCO3 25.9 10/16/2020    NBEA NOT REPORTED 10/16/2020    PBEA 2 10/16/2020    DRJ6BYG 27 10/16/2020    CUSV4RUV 96 10/16/2020    FIO2 INFORMATION NOT PROVIDED 07/23/2021     Lab Results   Component Value Date/Time    SPECIAL  R FOREARM 2 ML 07/21/2021 10:56 PM     Lab Results   Component Value Date/Time    CULTURE NO GROWTH 3 DAYS 07/21/2021 10:56 PM       Radiology:  XR ABDOMEN (KUB) (SINGLE AP VIEW)    Result Date: 7/22/2021  Mild dilation of the right hemicolon and patchy small bowel gas most likely due to ileus. XR CHEST PORTABLE    Result Date: 7/22/2021  No radiologic evidence of acute cardiopulmonary disease. CT CHEST ABDOMEN PELVIS W CONTRAST    Result Date: 7/21/2021  Probable acute diverticulitis or colitis left colon. No evidence of perforation or abscess at this time. Chronic pancreatitis. Multiple compression fractures some of which are new since the previous exam. Lingular ground-glass infiltrate. Recommend follow-up to resolution. Intra-atrial lipoma right atrium. Cardiac echo may be helpful. CT LUMBAR SPINE TRAUMA RECONSTRUCTION    Result Date: 7/21/2021  CT thoracic spine: 1. Decrease in height in T6 compared to prior study compatible with worsening compression with subacute etiology and mild protrusion of the dorsal aspect of T6. Narrowed T6-T7 neural foramina. 2.  Chronic superior height T8, T9, and T11 without interval change from prior study. 3.  Mild prevertebral soft tissue prominence T6 without evidence of abscess formation. CT lumbar spine: 1. No traumatic malalignment. 2.  Compression fracture superior L5 with subacute appearance.  3. Calcifications in the abdomen incompletely included appearance suggesting pancreatic calcifications. RECOMMENDATIONS: Please reference CT chest, abdomen and pelvis of same day. CT THORACIC SPINE TRAUMA RECONSTRUCTION    Result Date: 7/21/2021  CT thoracic spine: 1. Decrease in height in T6 compared to prior study compatible with worsening compression with subacute etiology and mild protrusion of the dorsal aspect of T6. Narrowed T6-T7 neural foramina. 2.  Chronic superior height T8, T9, and T11 without interval change from prior study. 3.  Mild prevertebral soft tissue prominence T6 without evidence of abscess formation. CT lumbar spine: 1. No traumatic malalignment. 2.  Compression fracture superior L5 with subacute appearance. 3.  Calcifications in the abdomen incompletely included appearance suggesting pancreatic calcifications. RECOMMENDATIONS: Please reference CT chest, abdomen and pelvis of same day. Physical Examination:        Physical Exam  Vitals and nursing note reviewed. Constitutional:       General: She is in acute distress. HENT:      Head: Normocephalic and atraumatic. Eyes:      Conjunctiva/sclera: Conjunctivae normal.      Pupils: Pupils are equal, round, and reactive to light. Cardiovascular:      Rate and Rhythm: Normal rate and regular rhythm. Heart sounds: No murmur heard. Pulmonary:      Effort: Pulmonary effort is normal. No accessory muscle usage or respiratory distress. Breath sounds: No stridor. Decreased breath sounds present. No wheezing, rhonchi or rales. Comments: On nasal canula  Abdominal:      General: Bowel sounds are decreased. There is no distension. Palpations: Abdomen is soft. Tenderness: There is generalized abdominal tenderness and tenderness in the right upper quadrant and epigastric area. There is no guarding. Musculoskeletal:         General: No tenderness. Skin:     General: Skin is warm and dry.       Findings: Rash ( Diffuse rash on the back ) present. No erythema or lesion. Neurological:      Mental Status: She is alert and oriented to person, place, and time. Cranial Nerves: No cranial nerve deficit. Motor: No seizure activity. Psychiatric:         Speech: Speech normal.         Behavior: Behavior normal. Behavior is cooperative.          Assessment:        Hospital Problems         Last Modified POA    * (Principal) Sepsis ([address] 7/22/2021 Yes    Acute respiratory failure with hypoxia ([address] 7/22/2021 Yes    Diverticulitis of large intestine without perforation or abscess without bleeding 7/22/2021 Yes    Essential hypertension 7/21/2021 Yes    Tobacco use 7/21/2021 Yes    Seizures ([address] 7/21/2021 Yes    COPD with acute exacerbation ([address] 7/22/2021 Yes    Recurrent major depressive disorder ([address] 7/21/2021 Yes    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24, the patient underwent 2 surgical resections without known recurrence 7/21/2021 Yes    AMAN (generalized anxiety disorder) 7/21/2021 Yes    Chronic peripheral neuropathic pain 7/21/2021 Yes    History of alcohol abuse 7/21/2021 Yes    Personal history of astrocytoma 7/21/2021 Yes    Marijuana abuse 7/21/2021 Yes    Closed fracture of fifth thoracic vertebra ([address] 7/22/2021 Yes    Elevated brain natriuretic peptide (BNP) level 7/22/2021 Yes    Elevated alkaline phosphatase level 7/22/2021 Yes    Chronic pancreatitis ([address] 7/22/2021 Yes    Erythema ab igne 7/22/2021 Yes    Compression fracture of thoracic vertebra ([address] 7/23/2021 Yes    Compression of lumbar vertebra ([address] 7/23/2021 Yes          Plan:        Sepsis: improving,  Likely secondary to colitis/diverticulitis, discontinue vancomycin and keep Zosyn, cultures are pending: Continue hydration    Colitis/diverticulitis/chronic pancreatitis/constipation/ ileus : bowel regimen per GI  Appreciate GI input    Chronic compression fracture in the thoracic vertebrae, there is worsening and compression fracture at T6 and new fracture at T5 and L5, neurosurgery consultation , evaluation is in progress  Patient denies any trauma or injury or falls  Patient has known osteoporosis    Acute hypoxemic respiratory failure: worse today , got stat CXR , worsening effusion and infiltrate, later in the day needing BIPAP, fluids stopped, add BIPAP , Lasix 40 mg IV   Likely secondary to sepsis and COPD, continue supplemental oxygen as needed       COPD exacerbation: Continue breathing treatments, switch to IV steroids oral prednisone    Elevated BNP: Patient and echocardiogram end of last year with preserved ejection fraction and no significant valvular disease, repeat orders in. Seizure disorder: Follows up with neurology as an outpatient, continue her home medications. History of alcohol use: Per the patient report she is sober for 6 months, mother does report patient did have a drink in March. Elevated alkaline phosphatase: elevated GGT , likely related to alcoholic liver disease  AFP and CEA 19-9 are negative    HTN: continue home medications assessment and plan the patient is so sick    HPL: continue home medications    Depression    Erythema Ab igne : On her back from prolonged use of heating Pads  .   DVT prophylaxis     Seizure like activity in a patient with H/O seizures : add Topamax per last neuro note, give 1 time Keppra, adding neurology consult    Patient condition continues to be critical , continue to monitor very closely    Discussed in detail with the patient, her mother and the nurse      Spent 40+  minutes  in examining, assessing and adjusting treatment / adding work up and discussing the plane with  with patient/staff       rJ Velazquez MD  7/24/2021  7:50 AM

## 2021-07-24 NOTE — PLAN OF CARE
Problem: Pain:  Goal: Pain level will decrease  Description: Pain level will decrease  Outcome: Ongoing  Goal: Control of acute pain  Description: Control of acute pain  Outcome: Ongoing  Goal: Control of chronic pain  Description: Control of chronic pain  Outcome: Ongoing     Problem: Skin Integrity:  Goal: Will show no infection signs and symptoms  Description: Will show no infection signs and symptoms  Outcome: Ongoing  Goal: Absence of new skin breakdown  Description: Absence of new skin breakdown  Outcome: Ongoing     Problem: Falls - Risk of:  Goal: Will remain free from falls  Description: Will remain free from falls  Outcome: Ongoing  Goal: Absence of physical injury  Description: Absence of physical injury  Outcome: Ongoing   Electronically signed by Chelsie Currie RN on 7/24/2021 at 5:23 AM

## 2021-07-24 NOTE — PLAN OF CARE
Problem: Pain:  Goal: Pain level will decrease  Description: Pain level will decrease  7/24/2021 0807 by Nelson Guerrero RN  Outcome: Ongoing  7/24/2021 0522 by Celi Jovel RN  Outcome: Ongoing     Problem: Skin Integrity:  Goal: Will show no infection signs and symptoms  Description: Will show no infection signs and symptoms  7/24/2021 0807 by Nelson Guerrero RN  Outcome: Ongoing  7/24/2021 0522 by Celi Jovel RN  Outcome: Ongoing     Problem: Falls - Risk of:  Goal: Will remain free from falls  Description: Will remain free from falls  7/24/2021 0807 by Nelson Guerrero RN  Outcome: Ongoing  7/24/2021 0522 by Celi Jovel RN  Outcome: Ongoing     Will continue to monitor patient

## 2021-07-24 NOTE — PROGRESS NOTES
Progress Note - Neurosurgery    Chief Complaint   Patient presents with    Back Pain     states chronic back pain becoming worse.  has recently started physical therapy       Subjective:  Travon Louise is a 45 y.o. female. Somewhat labile today. Denies any active back pain but states he has a significant bout of abdominal discomfort and cramping when she ambulates. Review of Systems    Objective:  Blood pressure 128/89, pulse 103, temperature 98.4 °F (36.9 °C), resp. rate 23, height 5' 5\" (1.651 m), weight 143 lb 4.8 oz (65 kg), SpO2 95 %. Physical Exam full strength with no focal neurological deficits. Regular rate. Equal chest rise and fall. Sats in the low high 80s to low 90s. Abdomen is soft with no guarding but tender diffusely  Neurologic Exam    Labs:  Hospital Outpatient Visit on 07/12/2021   Component Date Value Ref Range Status    Sodium 07/12/2021 134* 135 - 144 mmol/L Final    Serum Osmolality 07/12/2021 297* 275 - 295 mOsm/kg Final    Osmolality, Ur 07/12/2021 372  80 - 1300 mOsm/kg Final    Sodium,Ur 07/12/2021 33  mmol/L Final    No normal range established.      Results for orders placed during the hospital encounter of 10/28/19    MRI Brain W WO Contrast    Narrative  EXAMINATION:  MRI OF THE BRAIN WITHOUT AND WITH CONTRAST  10/28/2019 1:59 pm    TECHNIQUE:  Multiplanar multisequence MRI of the head/brain was performed without and  with the administration of intravenous contrast.    COMPARISON:  CT head October 27, 2019 and MR brain July 14, 2019    HISTORY:  ORDERING SYSTEM PROVIDED HISTORY: Abnormal finding on MRI of brain  TECHNOLOGIST PROVIDED HISTORY:  High signal in the parieto-occipital lobe on the right of uncertain etiology  Is the patient pregnant?->No  Reason for Exam: abnormal MRI in the past. follow up today  Additional signs and symptoms: no head injury  Relevant Medical/Surgical History: headaches    FINDINGS:  INTRACRANIAL STRUCTURES/VENTRICLES:  There is no acute infarct. No mass  effect or midline shift. No evidence of an acute intracranial hemorrhage. The ventricles and sulci are normal in size and configuration. The  sellar/suprasellar regions appear unremarkable. The normal signal voids  within the major intracranial vessels appear maintained. No abnormal focus  of enhancement is seen within the brain. T2 lengthening right parietal deep  white matter posteriorly unchanged. ORBITS: The visualized portion of the orbits demonstrate no acute abnormality. SINUSES: The visualized paranasal sinuses and mastoid air cells are well  aerated. BONES/SOFT TISSUES: The bone marrow signal intensity appears normal. The soft  tissues demonstrate no acute abnormality. Impression  Stable nonspecific T2 lengthening right parietal white matter. No acute  disease. No results found for this or any previous visit. No results found for this or any previous visit. Results for orders placed during the hospital encounter of 10/12/20    CT HEAD WO CONTRAST    Narrative  EXAMINATION:  CT OF THE HEAD WITHOUT CONTRAST  10/24/2020 9:02 am    TECHNIQUE:  CT of the head was performed without the administration of intravenous  contrast. Dose modulation, iterative reconstruction, and/or weight based  adjustment of the mA/kV was utilized to reduce the radiation dose to as low  as reasonably achievable. COMPARISON:  Multiple prior examinations including CT brain 12/13/2019 and 10/27/2019, MRI  brain 10/28/2019    HISTORY:  Reason for Exam: change in mental status    FINDINGS:  BRAIN/VENTRICLES: No acute intracranial hemorrhage, mass effect or midline  shift. No abnormal extra-axial fluid collection. The gray-white  differentiation is maintained without evidence of an acute infarct. No  evidence of hydrocephalus. Stable mild chronic small vessel ischemic change. ORBITS: The visualized portion of the orbits demonstrate no acute abnormality.     SINUSES: The visualized paranasal sinuses and mastoid air cells appear clear. SOFT TISSUES/SKULL:  No acute abnormality of the visualized skull or soft  tissues. Stable postsurgical changes of suboccipital  craniotomy/decompression. Impression  Stable CT brain with no acute intracranial abnormality. Assessment and Plan:  Acute thoracolumbar compression fractures that do not appear to be distinctly symptomatic. Patient with ongoing work-up and treatment of colitis per GI and infectious disease. Upright x-rays show stable compression fractures no progressive collapse.     No activity restrictions    Follow-up 4 weeks with repeat x-rays  Nati Goodwin DO  7/24/2021

## 2021-07-24 NOTE — PROGRESS NOTES
Physical Therapy          Physical Therapy Cancel Note      DATE: 2021    NAME: Crystal Linares  MRN: [id number]   : 1970      Patient not seen this date for Physical Therapy due to: Other:  Pt returning from X-rays, just assisted to bed by staff, pt in severe pain/spasms, moaning. RN checking to see if she can get any pain meds. Pt requests to come back later.       Electronically signed by Cliff Milner PT on 2021 at 10:26 AM

## 2021-07-25 ENCOUNTER — APPOINTMENT (OUTPATIENT)
Dept: CT IMAGING | Age: 51
DRG: 720 | End: 2021-07-25
Payer: MEDICARE

## 2021-07-25 ENCOUNTER — APPOINTMENT (OUTPATIENT)
Dept: GENERAL RADIOLOGY | Age: 51
DRG: 720 | End: 2021-07-25
Payer: MEDICARE

## 2021-07-25 LAB
ABSOLUTE EOS #: <0.03 K/UL (ref 0–0.44)
ABSOLUTE IMMATURE GRANULOCYTE: 0.09 K/UL (ref 0–0.3)
ABSOLUTE LYMPH #: 1.96 K/UL (ref 1.1–3.7)
ABSOLUTE MONO #: 1.11 K/UL (ref 0.1–1.2)
AMMONIA: 23 UMOL/L (ref 11–51)
ANION GAP SERPL CALCULATED.3IONS-SCNC: 15 MMOL/L (ref 9–17)
BASOPHILS # BLD: 0 % (ref 0–2)
BASOPHILS ABSOLUTE: <0.03 K/UL (ref 0–0.2)
BUN BLDV-MCNC: 18 MG/DL (ref 6–20)
BUN/CREAT BLD: ABNORMAL (ref 9–20)
CALCIUM SERPL-MCNC: 9.1 MG/DL (ref 8.6–10.4)
CARBAMAZEPINE DATE LAST DOSE: NORMAL
CARBAMAZEPINE DOSE AMOUNT: NORMAL
CARBAMAZEPINE DOSE TIME: NORMAL
CARBAMAZEPINE LEVEL: 7.7 UG/ML (ref 4–12)
CHLORIDE BLD-SCNC: 98 MMOL/L (ref 98–107)
CO2: 28 MMOL/L (ref 20–31)
CREAT SERPL-MCNC: 0.75 MG/DL (ref 0.5–0.9)
DIFFERENTIAL TYPE: ABNORMAL
EOSINOPHILS RELATIVE PERCENT: 0 % (ref 1–4)
GFR AFRICAN AMERICAN: >60 ML/MIN
GFR NON-AFRICAN AMERICAN: >60 ML/MIN
GFR SERPL CREATININE-BSD FRML MDRD: ABNORMAL ML/MIN/{1.73_M2}
GFR SERPL CREATININE-BSD FRML MDRD: ABNORMAL ML/MIN/{1.73_M2}
GLUCOSE BLD-MCNC: 117 MG/DL (ref 70–99)
HCT VFR BLD CALC: 37.6 % (ref 36.3–47.1)
HEMOGLOBIN: 12.2 G/DL (ref 11.9–15.1)
IMMATURE GRANULOCYTES: 1 %
LYMPHOCYTES # BLD: 11 % (ref 24–43)
MCH RBC QN AUTO: 29.6 PG (ref 25.2–33.5)
MCHC RBC AUTO-ENTMCNC: 32.4 G/DL (ref 28.4–34.8)
MCV RBC AUTO: 91.3 FL (ref 82.6–102.9)
MONOCYTES # BLD: 6 % (ref 3–12)
NRBC AUTOMATED: 0 PER 100 WBC
PDW BLD-RTO: 13.5 % (ref 11.8–14.4)
PLATELET # BLD: 374 K/UL (ref 138–453)
PLATELET ESTIMATE: ABNORMAL
PMV BLD AUTO: 10 FL (ref 8.1–13.5)
POTASSIUM SERPL-SCNC: 3.8 MMOL/L (ref 3.7–5.3)
RBC # BLD: 4.12 M/UL (ref 3.95–5.11)
RBC # BLD: ABNORMAL 10*6/UL
SEG NEUTROPHILS: 82 % (ref 36–65)
SEGMENTED NEUTROPHILS ABSOLUTE COUNT: 14.51 K/UL (ref 1.5–8.1)
SODIUM BLD-SCNC: 141 MMOL/L (ref 135–144)
WBC # BLD: 17.7 K/UL (ref 3.5–11.3)
WBC # BLD: ABNORMAL 10*3/UL

## 2021-07-25 PROCEDURE — 94640 AIRWAY INHALATION TREATMENT: CPT

## 2021-07-25 PROCEDURE — 82140 ASSAY OF AMMONIA: CPT

## 2021-07-25 PROCEDURE — 6370000000 HC RX 637 (ALT 250 FOR IP): Performed by: FAMILY MEDICINE

## 2021-07-25 PROCEDURE — 2700000000 HC OXYGEN THERAPY PER DAY

## 2021-07-25 PROCEDURE — 6360000002 HC RX W HCPCS: Performed by: STUDENT IN AN ORGANIZED HEALTH CARE EDUCATION/TRAINING PROGRAM

## 2021-07-25 PROCEDURE — 2580000003 HC RX 258: Performed by: NURSE PRACTITIONER

## 2021-07-25 PROCEDURE — 36415 COLL VENOUS BLD VENIPUNCTURE: CPT

## 2021-07-25 PROCEDURE — 6360000002 HC RX W HCPCS: Performed by: FAMILY MEDICINE

## 2021-07-25 PROCEDURE — 80048 BASIC METABOLIC PNL TOTAL CA: CPT

## 2021-07-25 PROCEDURE — 80156 ASSAY CARBAMAZEPINE TOTAL: CPT

## 2021-07-25 PROCEDURE — 71045 X-RAY EXAM CHEST 1 VIEW: CPT

## 2021-07-25 PROCEDURE — 6370000000 HC RX 637 (ALT 250 FOR IP): Performed by: NURSE PRACTITIONER

## 2021-07-25 PROCEDURE — 6370000000 HC RX 637 (ALT 250 FOR IP): Performed by: STUDENT IN AN ORGANIZED HEALTH CARE EDUCATION/TRAINING PROGRAM

## 2021-07-25 PROCEDURE — 99254 IP/OBS CNSLTJ NEW/EST MOD 60: CPT | Performed by: PSYCHIATRY & NEUROLOGY

## 2021-07-25 PROCEDURE — 6360000002 HC RX W HCPCS: Performed by: NURSE PRACTITIONER

## 2021-07-25 PROCEDURE — 85025 COMPLETE CBC W/AUTO DIFF WBC: CPT

## 2021-07-25 PROCEDURE — 99232 SBSQ HOSP IP/OBS MODERATE 35: CPT | Performed by: FAMILY MEDICINE

## 2021-07-25 PROCEDURE — 80157 ASSAY CARBAMAZEPINE FREE: CPT

## 2021-07-25 PROCEDURE — 80201 ASSAY OF TOPIRAMATE: CPT

## 2021-07-25 PROCEDURE — 70450 CT HEAD/BRAIN W/O DYE: CPT

## 2021-07-25 PROCEDURE — 2060000000 HC ICU INTERMEDIATE R&B

## 2021-07-25 RX ORDER — LACTULOSE 10 G/15ML
20 SOLUTION ORAL 3 TIMES DAILY
Status: DISCONTINUED | OUTPATIENT
Start: 2021-07-25 | End: 2021-07-27

## 2021-07-25 RX ORDER — FUROSEMIDE 10 MG/ML
40 INJECTION INTRAMUSCULAR; INTRAVENOUS ONCE
Status: COMPLETED | OUTPATIENT
Start: 2021-07-25 | End: 2021-07-25

## 2021-07-25 RX ORDER — POLYETHYLENE GLYCOL 3350 17 G/17G
17 POWDER, FOR SOLUTION ORAL 2 TIMES DAILY
Status: DISCONTINUED | OUTPATIENT
Start: 2021-07-25 | End: 2021-08-03 | Stop reason: HOSPADM

## 2021-07-25 RX ORDER — LORAZEPAM 2 MG/ML
2 INJECTION INTRAMUSCULAR EVERY 6 HOURS PRN
Status: DISCONTINUED | OUTPATIENT
Start: 2021-07-25 | End: 2021-07-27

## 2021-07-25 RX ORDER — CARBAMAZEPINE 200 MG/1
200 TABLET ORAL 3 TIMES DAILY
Status: DISCONTINUED | OUTPATIENT
Start: 2021-07-25 | End: 2021-08-03 | Stop reason: HOSPADM

## 2021-07-25 RX ADMIN — FUROSEMIDE 40 MG: 10 INJECTION, SOLUTION INTRAVENOUS at 18:25

## 2021-07-25 RX ADMIN — ACETAMINOPHEN 650 MG: 325 TABLET ORAL at 23:21

## 2021-07-25 RX ADMIN — BUSPIRONE HYDROCHLORIDE 15 MG: 15 TABLET ORAL at 23:22

## 2021-07-25 RX ADMIN — POLYETHYLENE GLYCOL 3350 17 G: 17 POWDER, FOR SOLUTION ORAL at 09:28

## 2021-07-25 RX ADMIN — FOLIC ACID 1 MG: 1 TABLET ORAL at 09:17

## 2021-07-25 RX ADMIN — CARBAMAZEPINE 200 MG: 200 TABLET ORAL at 16:06

## 2021-07-25 RX ADMIN — TOPIRAMATE 50 MG: 25 TABLET, FILM COATED ORAL at 09:17

## 2021-07-25 RX ADMIN — TOPIRAMATE 50 MG: 25 TABLET, FILM COATED ORAL at 23:22

## 2021-07-25 RX ADMIN — BUSPIRONE HYDROCHLORIDE 15 MG: 15 TABLET ORAL at 16:04

## 2021-07-25 RX ADMIN — PREGABALIN 200 MG: 100 CAPSULE ORAL at 16:05

## 2021-07-25 RX ADMIN — BACLOFEN 10 MG: 10 TABLET ORAL at 16:04

## 2021-07-25 RX ADMIN — FENTANYL CITRATE 50 MCG: 50 INJECTION, SOLUTION INTRAMUSCULAR; INTRAVENOUS at 05:42

## 2021-07-25 RX ADMIN — PREGABALIN 200 MG: 100 CAPSULE ORAL at 23:22

## 2021-07-25 RX ADMIN — LACTULOSE 20 G: 20 SOLUTION ORAL at 09:28

## 2021-07-25 RX ADMIN — PIPERACILLIN AND TAZOBACTAM 3375 MG: 3; .375 INJECTION, POWDER, FOR SOLUTION INTRAVENOUS at 18:25

## 2021-07-25 RX ADMIN — PIPERACILLIN AND TAZOBACTAM 3375 MG: 3; .375 INJECTION, POWDER, FOR SOLUTION INTRAVENOUS at 02:40

## 2021-07-25 RX ADMIN — BUSPIRONE HYDROCHLORIDE 15 MG: 15 TABLET ORAL at 09:17

## 2021-07-25 RX ADMIN — FERROUS SULFATE TAB EC 325 MG (65 MG FE EQUIVALENT) 325 MG: 325 (65 FE) TABLET DELAYED RESPONSE at 23:22

## 2021-07-25 RX ADMIN — AMLODIPINE BESYLATE 5 MG: 5 TABLET ORAL at 09:17

## 2021-07-25 RX ADMIN — METHYLPREDNISOLONE SODIUM SUCCINATE 40 MG: 40 INJECTION, POWDER, FOR SOLUTION INTRAMUSCULAR; INTRAVENOUS at 23:21

## 2021-07-25 RX ADMIN — LORAZEPAM 2 MG: 2 INJECTION INTRAMUSCULAR; INTRAVENOUS at 09:28

## 2021-07-25 RX ADMIN — SODIUM CHLORIDE, PRESERVATIVE FREE 10 ML: 5 INJECTION INTRAVENOUS at 09:18

## 2021-07-25 RX ADMIN — POTASSIUM CHLORIDE 20 MEQ: 1500 TABLET, EXTENDED RELEASE ORAL at 09:17

## 2021-07-25 RX ADMIN — PIPERACILLIN AND TAZOBACTAM 3375 MG: 3; .375 INJECTION, POWDER, FOR SOLUTION INTRAVENOUS at 09:18

## 2021-07-25 RX ADMIN — BUDESONIDE AND FORMOTEROL FUMARATE DIHYDRATE 2 PUFF: 160; 4.5 AEROSOL RESPIRATORY (INHALATION) at 08:39

## 2021-07-25 RX ADMIN — BACLOFEN 10 MG: 10 TABLET ORAL at 09:17

## 2021-07-25 RX ADMIN — LACTULOSE 20 G: 20 SOLUTION ORAL at 16:05

## 2021-07-25 RX ADMIN — ROPINIROLE HYDROCHLORIDE 1 MG: 1 TABLET, FILM COATED ORAL at 23:22

## 2021-07-25 RX ADMIN — BACLOFEN 10 MG: 10 TABLET ORAL at 23:23

## 2021-07-25 RX ADMIN — PREGABALIN 200 MG: 100 CAPSULE ORAL at 09:17

## 2021-07-25 RX ADMIN — TIOTROPIUM BROMIDE INHALATION SPRAY 2 PUFF: 3.12 SPRAY, METERED RESPIRATORY (INHALATION) at 08:39

## 2021-07-25 RX ADMIN — METHYLPREDNISOLONE SODIUM SUCCINATE 40 MG: 40 INJECTION, POWDER, FOR SOLUTION INTRAMUSCULAR; INTRAVENOUS at 09:17

## 2021-07-25 RX ADMIN — FERROUS SULFATE TAB EC 325 MG (65 MG FE EQUIVALENT) 325 MG: 325 (65 FE) TABLET DELAYED RESPONSE at 09:17

## 2021-07-25 RX ADMIN — ENOXAPARIN SODIUM 40 MG: 40 INJECTION SUBCUTANEOUS at 09:21

## 2021-07-25 RX ADMIN — CARBAMAZEPINE 200 MG: 100 TABLET, EXTENDED RELEASE ORAL at 09:17

## 2021-07-25 ASSESSMENT — PAIN SCALES - GENERAL
PAINLEVEL_OUTOF10: 7
PAINLEVEL_OUTOF10: 9
PAINLEVEL_OUTOF10: 6

## 2021-07-25 ASSESSMENT — ENCOUNTER SYMPTOMS
VOMITING: 0
BACK PAIN: 1
NAUSEA: 0
DIARRHEA: 0
BLOOD IN STOOL: 0
CHEST TIGHTNESS: 0
CONSTIPATION: 0
SHORTNESS OF BREATH: 1
WHEEZING: 0

## 2021-07-25 NOTE — PLAN OF CARE
Problem: Pain:  Goal: Pain level will decrease  Description: Pain level will decrease  Outcome: Ongoing  Goal: Control of acute pain  Description: Control of acute pain  Outcome: Ongoing  Goal: Control of chronic pain  Description: Control of chronic pain  Outcome: Ongoing     Problem: Skin Integrity:  Goal: Will show no infection signs and symptoms  Description: Will show no infection signs and symptoms  Outcome: Ongoing  Goal: Absence of new skin breakdown  Description: Absence of new skin breakdown  Outcome: Ongoing     Problem: Falls - Risk of:  Goal: Will remain free from falls  Description: Will remain free from falls  Outcome: Ongoing  Goal: Absence of physical injury  Description: Absence of physical injury  Outcome: Ongoing   Electronically signed by Sandeep Gentile RN on 7/25/2021 at 6:15 AM

## 2021-07-25 NOTE — PROGRESS NOTES
Neurology Resident Progress Note      SUBJECTIVE:  This is a 45 y.o.  female admitted 7/21/2021 for Sepsis [facility]) [A41.9]  This is a follow-up neurology progress note. The patient was seen and examined and the chart was reviewed. There were no acute events overnight. Patient was reported to have seizure like activity with stiffening of b/l UE followed by jerking for 15 seconds. During the episode, patient was noted to be saturating in 80s. No LOC, urinary/fecal incontinence. Patient was given 1 g Keppra IV by medicine team and Topamax 50 bid resumed. Today, patient appeared slow to respond and disoriented compared to yesterday    ROS  Constitutional: no fever, chills, fatigue  HENT: No change in vision or hearing   Respiratory: No cough, SOB, wheezing. Cardiovascular:  No chest pain, palpitations, leg swelling. Gastrointestinal: No nausea, vomiting, diarrhea. Genitourinary: No increased frequency, urgency. Musculoskeletal: No myalgia or arthralgia., back pain   Skin: No rashes or scarring or bruises. Neurological: No headache, paresthesia, or focal weakness. Endo/Heme/Allergies: Negative for itchy eyes or runny nose. Psychiatric/Behavioral: No anxiety or depressed mood.      HPI  See H&P     polyethylene glycol  17 g Oral BID    lactulose  20 g Oral TID    methylPREDNISolone  40 mg Intravenous Q12H    topiramate  50 mg Oral BID    tiotropium  2 puff Inhalation Daily    levalbuterol  1.25 mg Nebulization Once    amLODIPine  5 mg Oral Daily    baclofen  10 mg Oral TID    budesonide-formoterol  2 puff Inhalation BID    busPIRone  15 mg Oral TID    carBAMazepine  200 mg Oral BID    ferrous sulfate  325 mg Oral BID    folic acid  1 mg Oral Daily    potassium chloride  20 mEq Oral Daily with breakfast    pregabalin  200 mg Oral TID    rOPINIRole  1 mg Oral Nightly    sodium chloride flush  5-40 mL Intravenous 2 times per day    enoxaparin  40 mg Subcutaneous Daily    piperacillin-tazobactam  3,375 mg Intravenous Q8H       Past Medical History:   Diagnosis Date    Acute respiratory failure with hypoxia ([facility] 10/16/2020    Alcohol withdrawal syndrome, with delirium ([facility] 12/14/2019    Alcoholism ([facility]     Anemia 10/2020    GI bleed    Astrocytoma ([facility] - 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 Chelsie Oconnor ( last visit 11/20/2020 and note on chart )    Depression     bipolar, major depressive disorder, ptsd, anxiety    Dysphagia     GI bleed 10/2020    Hypertension     Memory loss     Oxygen dependent     pt stated not needed as of 12/9/2020    Pain, joint, ankle and foot     Pancreatic lesion 10/2020    Dr. Genesis Green working up pt    Peripheral neuropathy     Seizures ([facility]     also baseline tremors-last sz summer 2020    Tension headache     Under care of team 07/01/2021    [facility]    Under care of team 07/01/2021    pain management-Hamzah jimenez-last visit june 2021    Under care of team 07/01/2021    pulmonology-Dr Due[facility]as-[facility]-last virtual visit feb 2021    Under care of team 07/01/2021    psych-bahnfeldt NP-telemed-last visit may 2021    Under care of team 07/01/2021    bf-[address]-last visit june 2021    Wellness examination 07/01/2021    pcp-Ludivina grimes-last visit may 2021       Past Surgical History:   Procedure Laterality Date    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    COLONOSCOPY N/A 10/22/2020    COLONOSCOPY DIAGNOSTIC performed by Kimi Raza MD at [place] [address] (LOWER) N/A 12/9/2020    ENDOSCOPIC ULTRASOUND, UPPER WITH LINEAR SCOPE FOR BIOPSY OF MASS ON HEAD OF PANCREAS performed by Karson Cisneros MD at [address] Left 7-3-13    ORIF tibial plateau    FRACTURE SURGERY Right     small finger metacarpal fracture    HAND SURGERY      pins    HYSTERECTOMY  2003    UPPER GASTROINTESTINAL ENDOSCOPY N/A 10/22/2020    EGD BIOPSY performed by Shaheed Guillen MD at [address] [place] [address] N/A 4/5/2021    EGD BIOPSY performed by Shaheed Guillen MD at [facility]       PHYSICAL EXAM:      Blood pressure (!) 153/97, pulse 92, temperature 97.5 °F (36.4 °C), temperature source Oral, resp. rate 24, height 5' 5\" (1.651 m), weight 143 lb 4.8 oz (65 kg), SpO2 98 %. General Examination    General Resting comfortably in bed   Head Normocephalic, without obvious abnormality   Neck Supple, symmetrical. Good ROM. No midline or paraspinal tenderness. Lungs Respirations unlabored, no wheezing   Chest Wall No deformity   Heart RRR, no murmur   Abdomen Soft. Non-tender, non-distended   Extremities No cyanosis or edema or warmth. Pulses 2+ and symmetric   Skin: Skin  turgor normal, no rashes or lesions     Mental status  Speech Alert. Oriented to person and place, not to time  Patient is repeating herself   Language appropriate. No hallucinations or delusions. No SI/HI. Cranial nerves   II - VFF, visual threat intact  III, IV, VI - extra-ocular muscles full. No nystagmus. Pupils symmetric and responsive.    V - sensation symmetric         VII -  No facial droop or asymmetric NLF  VIII - intact hearing to conversational tone          IX, X - symmetrical palate elevation   XI - 5/5 strength symmetric  XII - tongue midline   Motor function  Strength: grossly 4/5 in b/l              Deltoid, biceps, triceps, wrist flexion, wrist extension             Hip flexion/extension, knee flexion/extension, plantar flexion  Bulk: grossly normal no atrophy  Tone: symmetric b/l arms and legs  Abnormal movements: No abnormal movements or tremor   Sensory function Symmetric to touch in all extremities bilaterally   Cerebellar No dysmetria or dysdiadochocinesia    Reflex function DTR:        2+ b/l symmetric in biceps, brachioradialis, patellar, calcaneal  Babinski b/l plantar downgoing   Gait                  Not assessed       Investigations:      Laboratory Testing:  Recent Results (from the past 24 hour(s))   Lactate, Sepsis    Collection Time: 07/24/21 10:44 PM   Result Value Ref Range    Lactic Acid, Sepsis NOT REPORTED 0.5 - 1.9 mmol/L    Lactic Acid, Sepsis, Whole Blood 0.9 0.5 - 1.9 mmol/L   CBC    Collection Time: 07/24/21 10:44 PM   Result Value Ref Range    WBC 18.5 (H) 3.5 - 11.3 k/uL    RBC 4.02 3.95 - 5.11 m/uL    Hemoglobin 12.0 11.9 - 15.1 g/dL    Hematocrit 36.5 36.3 - 47.1 %    MCV 90.8 82.6 - 102.9 fL    MCH 29.9 25.2 - 33.5 pg    MCHC 32.9 28.4 - 34.8 g/dL    RDW 13.5 11.8 - 14.4 %    Platelets 518 943 - 740 k/uL    MPV 9.5 8.1 - 13.5 fL    NRBC Automated 0.0 0.0 per 100 WBC   CBC Auto Differential    Collection Time: 07/25/21  7:01 AM   Result Value Ref Range    WBC 17.7 (H) 3.5 - 11.3 k/uL    RBC 4.12 3.95 - 5.11 m/uL    Hemoglobin 12.2 11.9 - 15.1 g/dL    Hematocrit 37.6 36.3 - 47.1 %    MCV 91.3 82.6 - 102.9 fL    MCH 29.6 25.2 - 33.5 pg    MCHC 32.4 28.4 - 34.8 g/dL    RDW 13.5 11.8 - 14.4 %    Platelets 161 440 - 156 k/uL    MPV 10.0 8.1 - 13.5 fL    NRBC Automated 0.0 0.0 per 100 WBC    Differential Type NOT REPORTED     Seg Neutrophils 82 (H) 36 - 65 %    Lymphocytes 11 (L) 24 - 43 %    Monocytes 6 3 - 12 %    Eosinophils % 0 (L) 1 - 4 %    Basophils 0 0 - 2 %    Immature Granulocytes 1 (H) 0 %    Segs Absolute 14.51 (H) 1.50 - 8.10 k/uL    Absolute Lymph # 1.96 1.10 - 3.70 k/uL    Absolute Mono # 1.11 0.10 - 1.20 k/uL    Absolute Eos # <0.03 0.00 - 0.44 k/uL    Basophils Absolute <0.03 0.00 - 0.20 k/uL    Absolute Immature Granulocyte 0.09 0.00 - 0.30 k/uL    WBC Morphology NOT REPORTED     RBC Morphology NOT REPORTED     Platelet Estimate NOT REPORTED Basic Metabolic Panel    Collection Time: 07/25/21  7:01 AM   Result Value Ref Range    Glucose 117 (H) 70 - 99 mg/dL    BUN 18 6 - 20 mg/dL    CREATININE 0.75 0.50 - 0.90 mg/dL    Bun/Cre Ratio NOT REPORTED 9 - 20    Calcium 9.1 8.6 - 10.4 mg/dL    Sodium 141 135 - 144 mmol/L    Potassium 3.8 3.7 - 5.3 mmol/L    Chloride 98 98 - 107 mmol/L    CO2 28 20 - 31 mmol/L    Anion Gap 15 9 - 17 mmol/L    GFR Non-African American >60 >60 mL/min    GFR African American >60 >60 mL/min    GFR Comment          GFR Staging NOT REPORTED        Imaging/Diagnostics:  XR THORACIC SPINE (2 VIEWS)    Result Date: 7/23/2021  EXAMINATION: THREE XRAY VIEWS OF THE LUMBAR SPINE;   XRAY VIEWS OF THE THORACIC SPINE 7/23/2021 4:54 pm COMPARISON: MRI studies of earlier the same day. HISTORY: ORDERING SYSTEM PROVIDED HISTORY: L5 fracture TECHNOLOGIST PROVIDED HISTORY: Obtain standing ap/lat L5 fracture FINDINGS: Lumbar: The patient has a 20% superior endplate compression and L5 with acute to subacute appearance with slight sclerosis in the superior endplate. No malalignment is noted. Other vertebra are well maintained in height. Pedicles are intact. SI joints are symmetrical.  Aorta is calcified without evidence of aneurysm. Thoracic spine: Osteopenia complicates assessment. Patient has low lung volumes with appearance of pleural effusions. Limited visualization is noted with significant compression deformity in T6 and superior endplate loss in height T8, T9 and T11. Pedicles are intact. No subluxation. Mild levo scoliotic curvature. Lumbar spine: Superior endplate fracture L5 which appears acute to subacute. No subluxation. Other vertebra well maintained. Spine significant compression deformity T6 with there are endplate loss in height T8, T9 and T10. No subluxation. Mild levo scoliotic curvature. Osteopenia complicates assessment. Pedicles are intact. Low lung volumes complicate assessment.   There is suggestion of pleural effusions. XR LUMBAR SPINE (2-3 VIEWS)    Result Date: 7/23/2021  EXAMINATION: THREE XRAY VIEWS OF THE LUMBAR SPINE;   XRAY VIEWS OF THE THORACIC SPINE 7/23/2021 4:54 pm COMPARISON: MRI studies of earlier the same day. HISTORY: ORDERING SYSTEM PROVIDED HISTORY: L5 fracture TECHNOLOGIST PROVIDED HISTORY: Obtain standing ap/lat L5 fracture FINDINGS: Lumbar: The patient has a 20% superior endplate compression and L5 with acute to subacute appearance with slight sclerosis in the superior endplate. No malalignment is noted. Other vertebra are well maintained in height. Pedicles are intact. SI joints are symmetrical.  Aorta is calcified without evidence of aneurysm. Thoracic spine: Osteopenia complicates assessment. Patient has low lung volumes with appearance of pleural effusions. Limited visualization is noted with significant compression deformity in T6 and superior endplate loss in height T8, T9 and T11. Pedicles are intact. No subluxation. Mild levo scoliotic curvature. Lumbar spine: Superior endplate fracture L5 which appears acute to subacute. No subluxation. Other vertebra well maintained. Spine significant compression deformity T6 with there are endplate loss in height T8, T9 and T10. No subluxation. Mild levo scoliotic curvature. Osteopenia complicates assessment. Pedicles are intact. Low lung volumes complicate assessment. There is suggestion of pleural effusions. XR ABDOMEN (KUB) (SINGLE AP VIEW)    Result Date: 7/22/2021  EXAMINATION: ONE SUPINE XRAY VIEW(S) OF THE ABDOMEN 7/22/2021 12:37 pm COMPARISON: Chest, abdomen, and pelvis CT 07/21/2021; abdomen radiograph 07/14/2019 HISTORY: ORDERING SYSTEM PROVIDED HISTORY: abdominal distension/ constipation TECHNOLOGIST PROVIDED HISTORY: abdominal distension/ constipation Reason for Exam: abdominal distension/constipation Acuity: Unknown Type of Exam: Unknown FINDINGS: Overlying heart monitor leads and tubing.  Exclusion of the diaphragm from the field of view. Patchy small bowel gas with no abnormally dilated small bowel loops. Mild dilation of the ascending and transverse colon. Patchy minimal to mild stool. Suspected ingested medication projecting over the left upper quadrant. Suboptimally seen pancreatic calcifications consistent with chronic pancreatitis. Venous phleboliths in the pelvis bilaterally. Diffuse bony demineralization. No obvious acute fracture. Mild dilation of the right hemicolon and patchy small bowel gas most likely due to ileus. XR ACUTE ABD SERIES CHEST 1 VW    Result Date: 7/24/2021  EXAMINATION: TWO XRAY VIEWS OF THE ABDOMEN AND SINGLE  XRAY VIEW OF THE CHEST 7/24/2021 10:01 am COMPARISON: Chest radiograph July 23, 2021, abdominal radiograph July 22, 2021 and priors. HISTORY: ORDERING SYSTEM PROVIDED HISTORY: abdominal pain / ileus/ SOB TECHNOLOGIST PROVIDED HISTORY: abdominal pain / ileus/ SOB FINDINGS: There are bilateral pleural effusions, right greater than left. There is bibasilar airspace consolidation. Mild patchy bilateral airspace opacities are seen in the upper lungs, left greater than right. These findings are increased compared to prior. No pneumothorax. Cardiac and mediastinal contours are without acute process. Gas and stool are seen within nondilated small and large bowel loops. There are few nonspecific air-fluid levels within nondilated loops in the right lower abdomen. No free air. Calcified phleboliths are seen over the pelvis. Soft tissues and osseous structures are without acute process. Bilateral airspace disease and pleural effusions. Findings in the upper lobes appear increased compared to prior, left greater than right. Findings may be related to asymmetric edema with superimposed pneumonia not excluded. Gas and stool are seen throughout nondilated bowel loops with few nonspecific air-fluid levels in the right lower abdomen, likely in small bowel.   Findings may be physiologic or related to mild ileus. No evidence of obstruction or free air. MRI THORACIC SPINE WO CONTRAST    Result Date: 7/23/2021  EXAMINATION: MRI OF THE THORACIC SPINE WITHOUT CONTRAST  7/23/2021 9:01 am TECHNIQUE: Multiplanar multisequence MRI of the thoracic spine was performed without the administration of intravenous contrast. COMPARISON: None. HISTORY: ORDERING SYSTEM PROVIDED HISTORY: f/u T6 compression fracutre TECHNOLOGIST PROVIDED HISTORY: f/u T6 compression fracutre Is the patient pregnant?->No Reason for Exam: T6 compression fx Additional signs and symptoms: back pain FINDINGS: Evaluation is somewhat limited given artifact predominately on the STIR sequence as well as patient motion. BONES/ALIGNMENT: Compression deformities are seen of the T6, T8, T9 and T11 vertebral bodies. The T6 vertebral body demonstrates diffusely decreased T1 and T2 marrow signal compatible with the sclerosis seen on the recent CT. These compression deformities are likely chronic in nature. The remaining vertebral body heights appear grossly maintained. No evidence of spondylolisthesis. There appears to be a presumed atypical intraosseous hemangioma within the T7 vertebral body. Scattered Modic type degenerative endplate changes. There is question mild bone marrow edema within the lateral elements at the T6 and T7 level. SPINAL CORD: No abnormal cord signal is seen. SOFT TISSUES: No paraspinal mass clearly identified. Bilateral pleural effusions, left greater than right. DEGENERATIVE CHANGES: Multilevel degenerative changes of the thoracic spine without significant spinal canal stenosis. There may be moderate bilateral neural foraminal narrowing at T6-T7.     1. Limited examination. 2. Compression deformities involving T6, T8, T9 and T11. These are likely chronic in nature. Diffusely decreased T1 and T2 marrow signal within the T6 vertebral body compatible with the sclerosis seen on the prior CT.  3. There is minimal bone marrow edema involving the left lateral elements of T6 and T7. Unclear whether this is degenerative in nature or perhaps sequelae of recent or remote trauma. 4. No significant spinal canal stenosis of the thoracic spine. 5. Moderate bilateral neural foraminal narrowing at T6-T7. 6. Bilateral pleural effusions, left greater than right. MRI LUMBAR SPINE WO CONTRAST    Result Date: 7/23/2021  EXAMINATION: MRI OF THE LUMBAR SPINE WITHOUT CONTRAST, 7/23/2021 8:12 am TECHNIQUE: Multiplanar multisequence MRI of the lumbar spine was performed without the administration of intravenous contrast. COMPARISON: None. HISTORY: ORDERING SYSTEM PROVIDED HISTORY: f/u L5 compression fracture TECHNOLOGIST PROVIDED HISTORY: f/u L5 compression fracture Is the patient pregnant?->No Reason for Exam: L5 compression fx Additional signs and symptoms: back pain FINDINGS: BONES/ALIGNMENT: There is an acute compression deformity involving the superior endplate of L5 with approximately 35% loss of height. No significant bulging of the posterior cortex. The remaining vertebral body heights appear maintained. There straightening of the normal lumbar lordosis. Minimal retrolisthesis at L5-S1. No spondylolisthesis at the remaining levels. Modic type 1 degenerative endplate changes are seen at nearly all levels of the lumbar spine. SPINAL CORD: The conus terminates at a normal level. SOFT TISSUES: No paraspinal mass identified. L1-L2: There is no significant disc bulge, spinal canal stenosis or neural foraminal narrowing. L2-L3: There is no significant disc bulge, spinal canal stenosis or neural foraminal narrowing. L3-L4: There is a disc bulge contacting the ventral thecal sac. No significant spinal canal stenosis. Minimal left neural foraminal narrowing. No significant right neural foraminal narrowing. L4-L5: There is a disc bulge contacting the ventral thecal sac. No significant spinal canal stenosis.   Facet arthrosis contributes to mild bilateral neural foraminal narrowing. L5-S1: There is a disc bulge with bilateral facet arthrosis contributing to mild bilateral neural foraminal narrowing. No significant spinal canal stenosis. 1. Acute compression deformity of the superior endplate of L5 with approximately 35% loss of height. No significant bulging of the posterior cortex. 2. No significant spinal canal stenosis of the lumbar spine. 3. Neural foraminal narrowing at L3-L4 through L5-S1. 4. Minimal retrolisthesis at L5-S1. XR CHEST PORTABLE    Result Date: 7/23/2021  EXAMINATION: ONE XRAY VIEW OF THE CHEST 7/23/2021 6:04 am COMPARISON: 07/22/2021 HISTORY: ORDERING SYSTEM PROVIDED HISTORY: hypoxia TECHNOLOGIST PROVIDED HISTORY: hypoxia Reason for Exam: hypoxia   upright port FINDINGS: Cardiac size and mediastinal structures appear similar. Interval development of bibasilar airspace disease and pleural effusions. Osteopenia. No acute osseous fracture is identified. Interval development of bibasilar infiltrates and small pleural effusions which could represent dependent edema, pneumonia or aspiration. XR CHEST PORTABLE    Result Date: 7/22/2021  EXAMINATION: ONE XRAY VIEW OF THE CHEST 7/22/2021 5:52 am COMPARISON: 17 October 2020 HISTORY: ORDERING SYSTEM PROVIDED HISTORY: PNA TECHNOLOGIST PROVIDED HISTORY: PNA Reason for Exam: port upright FINDINGS: AP portable view is electronically marked regarding sides. No infiltrates, effusions or pneumothoraces. Cardiomediastinal silhouette is within normal limits. The remaining visualized osseous and soft tissues are unchanged. No radiologic evidence of acute cardiopulmonary disease.      CT CHEST ABDOMEN PELVIS W CONTRAST    Result Date: 7/21/2021  EXAMINATION: CT OF THE CHEST, ABDOMEN, AND PELVIS WITH CONTRAST 7/21/2021 4:58 pm TECHNIQUE: CT of the chest, abdomen and pelvis was performed with the administration of intravenous contrast. Multiplanar reformatted images are provided for review. Dose modulation, iterative reconstruction, and/or weight based adjustment of the mA/kV was utilized to reduce the radiation dose to as low as reasonably achievable. COMPARISON: February 12, 2021 CT abdomen and May 24, 2021 CT chest HISTORY: ORDERING SYSTEM PROVIDED HISTORY: sepsis, back pain TECHNOLOGIST PROVIDED HISTORY: sepsis, back pain Decision Support Exception - unselect if not a suspected or confirmed emergency medical condition->Emergency Medical Condition (MA) Reason for Exam: back pain, sepsis Acuity: Unknown Type of Exam: Unknown FINDINGS: Chest: Mediastinum: Calcific coronary artery disease. 3.5 cm right atrial lipoma. Heart and great vessels are otherwise unremarkable. No pulmonary embolism or aortic aneurysm. No pathologic mediastinal or hilar lymphadenopathy. Lungs/pleura: Ground-glass infiltrate noted within the lingula. Stable loculated small pneumatocele and new cystic bronchiectasis noted on the right. Soft Tissues/Bones: Multiple compression fractures noted in the thoracic spine. This demonstrates slight interval progression at T7 and T10. Old sternal fracture. Abdomen/Pelvis: Organs: The abdominal wall appears normal. The liver, spleen,, and adrenals appear normal.  Multiple coarse calcifications noted within the pancreas. Gallbladder normal. Punctate nonobstructing nephroliths on the left. Normal right kidney. The bladder appears normal. GI/Bowel: The stomach,small bowel, and colon appear normal. Appendix normal. Inflammatory changes pericolonic fat on the left and isolated diverticula noted. Pelvis: Normal Peritoneum/Retroperitoneum: The abdominal aorta and iliac arteries are normal in caliber. There is no pathologic adenopathy. Bones/Soft Tissues: New compression fracture L5. Probable acute diverticulitis or colitis left colon. No evidence of perforation or abscess at this time. Chronic pancreatitis.  Multiple compression fractures some of which are new since the previous exam. Lingular ground-glass infiltrate. Recommend follow-up to resolution. Intra-atrial lipoma right atrium. Cardiac echo may be helpful. CT LUMBAR SPINE TRAUMA RECONSTRUCTION    Result Date: 7/21/2021  EXAMINATION: CT OF THE THORACIC SPINE WITHOUT CONTRAST; CT OF THE LUMBAR SPINE WITHOUT CONTRAST  7/21/2021 TECHNIQUE: CT of the thoracic spine was performed without the administration of intravenous contrast. Multiplanar reformatted images are provided for review. Dose modulation, iterative reconstruction, and/or weight based adjustment of the mA/kV was utilized to reduce the radiation dose to as low as reasonably achievable.; CT of the lumbar spine was performed without the administration of intravenous contrast. Multiplanar reformatted images are provided for review. Dose modulation, iterative reconstruction, and/or weight based adjustment of the mA/kV was utilized to reduce the radiation dose to as low as reasonably achievable. COMPARISON: CT chest of 21 May 2021; CT abdomen 12 February 2021. HISTORY: ORDERING SYSTEM PROVIDED HISTORY: sepsis, back pain TECHNOLOGIST PROVIDED HISTORY: sepsis, back pain Is the patient pregnant?->No Reason for Exam: back pain, sepsis Acuity: Unknown Type of Exam: Unknown; ORDERING SYSTEM PROVIDED HISTORY: BACK PAIN TECHNOLOGIST PROVIDED HISTORY: sepsis, back pain Is the patient pregnant?->No Reason for Exam: back pain, sepsis Acuity: Unknown Type of Exam: Unknown FINDINGS: CT thoracic spine without: BONES/ALIGNMENT: Osteopenia complicates assessment. Normal spine alignment is noted. There has been worsening loss in height in the T6 vertebral body which demonstrated a compression fracture which with subacute on prior examination. Minimal bulging of the dorsal aspect of the vertebral body is noted. A Schmorl's node superior aspect of T8 is noted with minimal superior loss in height, nonacute. Schmorl's node is present superior aspect T9.  There is no change in a chronic superior endplate compression F90. DEGENERATIVE CHANGES: Diffuse mild degenerative and degenerative disc changes are noted. No gross bony canal stenosis. Narrowing of the bilateral T6-T7 neural foramina is noted. Other neural foramina are patent. SOFT TISSUES: Mild soft tissue swelling ventral to the T6 vertebral body is noted. No bony destructive process or abscess formation is noted. Included lung bases are unremarkable. CT lumbar spine without: BONES/ALIGNMENT: Normal spine alignment is noted. Superior endplate compression L5 is noted, new since the February study. The degree of depression is 20-25%. Findings suggest subacute etiology with portions of fracture line visible but with increased sclerosis indicative of healing. No significant retropulsion of the dorsal aspect of the vertebra toward the spinal canal is noted. DEGENERATIVE CHANGES: Diffuse mild degenerative changes. Multilevel mild disc protrusions are noted. Bony neural foramina are patent. No exiting nerve root impingement is noted. SOFT TISSUES/RETROPERITONEUM: No paraspinal mass is demonstrated. Scattered calcified plaque in the abdominal aorta is noted. Portions of a calcified retroperitoneal preaortic mass are visualized on some of the images. This is likely thin pancreas consistent with pancreatitis. However, areas incompletely included. CT thoracic spine: 1. Decrease in height in T6 compared to prior study compatible with worsening compression with subacute etiology and mild protrusion of the dorsal aspect of T6. Narrowed T6-T7 neural foramina. 2.  Chronic superior height T8, T9, and T11 without interval change from prior study. 3.  Mild prevertebral soft tissue prominence T6 without evidence of abscess formation. CT lumbar spine: 1. No traumatic malalignment. 2.  Compression fracture superior L5 with subacute appearance.  3.  Calcifications in the abdomen incompletely included appearance suggesting DEGENERATIVE CHANGES: Diffuse mild degenerative and degenerative disc changes are noted. No gross bony canal stenosis. Narrowing of the bilateral T6-T7 neural foramina is noted. Other neural foramina are patent. SOFT TISSUES: Mild soft tissue swelling ventral to the T6 vertebral body is noted. No bony destructive process or abscess formation is noted. Included lung bases are unremarkable. CT lumbar spine without: BONES/ALIGNMENT: Normal spine alignment is noted. Superior endplate compression L5 is noted, new since the February study. The degree of depression is 20-25%. Findings suggest subacute etiology with portions of fracture line visible but with increased sclerosis indicative of healing. No significant retropulsion of the dorsal aspect of the vertebra toward the spinal canal is noted. DEGENERATIVE CHANGES: Diffuse mild degenerative changes. Multilevel mild disc protrusions are noted. Bony neural foramina are patent. No exiting nerve root impingement is noted. SOFT TISSUES/RETROPERITONEUM: No paraspinal mass is demonstrated. Scattered calcified plaque in the abdominal aorta is noted. Portions of a calcified retroperitoneal preaortic mass are visualized on some of the images. This is likely thin pancreas consistent with pancreatitis. However, areas incompletely included. CT thoracic spine: 1. Decrease in height in T6 compared to prior study compatible with worsening compression with subacute etiology and mild protrusion of the dorsal aspect of T6. Narrowed T6-T7 neural foramina. 2.  Chronic superior height T8, T9, and T11 without interval change from prior study. 3.  Mild prevertebral soft tissue prominence T6 without evidence of abscess formation. CT lumbar spine: 1. No traumatic malalignment. 2.  Compression fracture superior L5 with subacute appearance. 3.  Calcifications in the abdomen incompletely included appearance suggesting pancreatic calcifications.  RECOMMENDATIONS: Please reference CT chest, abdomen and pelvis of same day. Assessment & Differential Dx:      Primary Problem  Sepsis [facility])    Active Hospital Problems    Diagnosis Date Noted    Compression fracture of thoracic vertebra (HCC) [S22.000A]     Compression of lumbar vertebra ([address] [S32.000A]     Closed fracture of fifth thoracic vertebra ([address] [S22.059A] 07/22/2021    Diverticulitis of large intestine without perforation or abscess without bleeding [K57.32] 07/22/2021    Elevated brain natriuretic peptide (BNP) level [R79.89] 07/22/2021    Elevated alkaline phosphatase level [R74.8] 07/22/2021    Chronic pancreatitis (HCC) [K86.1] 07/22/2021    Erythema ab igne [L59.0] 07/22/2021    Sepsis ([address] [A41.9] 07/21/2021    Marijuana abuse [F12.10] 06/30/2021    Personal history of astrocytoma [Z85.841] 05/04/2021    History of alcohol abuse [F10.11] 05/04/2021    Chronic peripheral neuropathic pain [M79.2, G89.29] 05/04/2021    AMAN (generalized anxiety disorder) [F41.1] 03/22/2021    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24, the patient underwent 2 surgical resections without known recurrence [C71.9] 10/23/2020    Recurrent major depressive disorder ([address] [F33.9] 10/20/2020    Acute respiratory failure with hypoxia ([address] [J96.01] 10/16/2020    COPD with acute exacerbation ([address] [J44.1] 10/12/2020    Seizures ([address] [R56.9]     Tobacco use [Z72.0]     Essential hypertension [I10] 08/13/2015       Case of 44 yo female with known history of seizure disorder s/p astrocytoma resection admitted for acute colitis, diverticulitis, acute hypoxic respiratory failure, T5/L5 compression fracture on MRI. Patient has brief 15 second jerking of UE with oxygen desaturation to 80s. Patient takes Tegretol 200 mg bid and Topamax 50 mg bid, dose recently adjusted from Tegretol 200 tid in office 7/6/21. Patient hs been off Topamax from past few days while admitted. Impression is breakthrough seizure.  Topamax resumed, will increase Tegretol to 200 tid and obtain Tegretol level. CT head wo contrast pending. Ammonia pending.      MRI brain 7/2019: suboccipital craniectomy with encephalomalacia and gliosis    Plan:     Seizure disorder   - Increase Tegretol po 200 mg bid to 200 tid   - Continue Topamax 50 mg bid   - Tegretol levels pending   - Head CT wo contrast   - Maintain O2 >94%   - IV Ativan for seizures >3 min or >3 seizures in 24 hrs   - Seizure precaution         Medical management per primary     DVT prophylaxis: Lovenox 40 mg IV               Getachew Pedersen MD, MD, 7/25/2021 9:04 AM

## 2021-07-25 NOTE — PROGRESS NOTES
[facility]  Office: [address], DO, Claude Rosales, DO, Moraima Delgado, DO, Ree Haddad Blood, DO, Samm Gamez MD, Maegan Moore MD, Dominique Wood MD, Jona Zimmer MD, Libby Ferguson MD, Yasir Tyler MD, Patricia Hoyos MD, Shen Guillen DO, Dasia Ling MD, Jimi Gross DO, Michelle Longoria MD,  Leonarda Hummel DO, Brett Walker MD, Destiny Sorto MD, Gilberto Mendieta MD, Melisa Goldmann, MD, Jayden Rincon MD, Marleni Willis MD, Ezequiel Saldaña [facility], [facility], CNP, Ilia Clifford, CNP, Jimenez Larios, CNS, Angela Piña, CNP, Jessica Navarrete, CNP, Jayne Camacho, CNP, Christina Kendall, CNP, Xavi Ritter, CNP, Estefani Trevino PA-C, Justino Thomson, [facility], David Jason, CNP, Geovanni Maher, CNP, Art Galvin, CNP, Yana Alonso, CNP, Augusta Bar, CNP, Marycarmen Kelley, CNP, Claudette David, CNP, Zeeshan Dalton, [address] [place] [address]    Progress Note    7/25/2021    8:17 AM    Name:   Antoinette Godinez  MRN:     [id number]     Acct:      [de-identified]   Room:   2022/2022-01   Day:  4  Admit Date:  7/21/2021  3:06 PM    PCP:   Jamee Falcon MD  Code Status:  Full Code    Subjective:     C/C:   Chief Complaint   Patient presents with    Back Pain     states chronic back pain becoming worse.  has recently started physical therapy   Abdominal pain     Interval History Status: Better  Patient seen and examined at bedside, been on BIPAP overnight, feeling and breathing better today  Abdominal pain is better, no BM yet  Intermittent back pain. Abdominal pain is better. patient denies any chest pain, chills, fevers, nausea or vomiting.   Patient vitals, labs and all providers notes were reviewed,from overnight shift and morning updates were noted and discussed with the nurse    Brief History:     Per chart  This is a 77-year-old female with underlying history of hypertension, COPD, anxiety/depression, UTI, thoracic compression fractures, peripheral neuropathy, migraine headaches, seizure disorder, and alcoholism (last drink 6 months ago) who presents the emergency department with generalized malaise, headache and abdominal/back pain. Initial blood work revealed leukocytosis, elevated alkaline phosphatase, she was tachycardic and hypoxic as well, imaging revealed picture of acute diverticulitis/colitis of left colon without peripheral abscess along with chronic pancreatitis, CT spine revealed stable T7-T9 and T11 compression fracture, worsening of compression fracture of T6 and new T5 and superior L5 subacute fracture. Patient was admitted for further management. Also mentioned rash on her back, from location and morphology seems to be related to heating beds    Review of Systems:     Review of Systems   Constitutional: Positive for activity change, appetite change and fatigue. Negative for chills, diaphoresis and fever. HENT: Negative for congestion. Eyes: Negative for visual disturbance. Respiratory: Positive for shortness of breath. Negative for chest tightness and wheezing. Cardiovascular: Negative for chest pain, palpitations and leg swelling. Gastrointestinal: Negative for blood in stool, constipation, diarrhea, nausea and vomiting. Genitourinary: Negative for difficulty urinating. Musculoskeletal: Positive for arthralgias and back pain. Skin: Positive for rash. Neurological: Positive for weakness. Negative for dizziness, light-headedness, numbness and headaches. Psychiatric/Behavioral: The patient is nervous/anxious. All other systems reviewed and are negative. Medications:      Allergies:  No Known Allergies    Current Meds:   Scheduled Meds:    polyethylene glycol  17 g Oral BID    lactulose  20 g Oral TID    methylPREDNISolone  40 mg Intravenous Q12H    topiramate  50 mg Oral BID    tiotropium  2 puff Inhalation Daily    levalbuterol  1.25 mg Nebulization Once    amLODIPine  5 mg Oral Daily    baclofen  10 mg Oral TID    budesonide-formoterol  2 puff Inhalation BID    busPIRone  15 mg Oral TID    carBAMazepine  200 mg Oral BID    ferrous sulfate  325 mg Oral BID    folic acid  1 mg Oral Daily    potassium chloride  20 mEq Oral Daily with breakfast    pregabalin  200 mg Oral TID    rOPINIRole  1 mg Oral Nightly    sodium chloride flush  5-40 mL Intravenous 2 times per day    enoxaparin  40 mg Subcutaneous Daily    piperacillin-tazobactam  3,375 mg Intravenous Q8H     Continuous Infusions:    sodium chloride 25 mL (07/24/21 1803)     PRN Meds: hyoscyamine, hydrOXYzine, [Held by provider] fentanNYL, bisacodyl, albuterol, traZODone, sodium chloride flush, sodium chloride, acetaminophen **OR** acetaminophen, [Held by provider] oxyCODONE-acetaminophen **OR** [DISCONTINUED] oxyCODONE-acetaminophen, melatonin    Data:     Past Medical History:   has a past medical history of Acute respiratory failure with hypoxia ([address] Alcohol withdrawal syndrome, with delirium ([address] Alcoholism ([address] Anemia, 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 Social History:   reports that she has been smoking cigarettes. She has a 15.00 pack-year smoking history. She has never used smokeless tobacco. She reports previous alcohol use. She reports current drug use. Frequency: 2.00 times per week. Drug: Marijuana.      Family History:   Family History   Problem Relation Age of Onset    Other Father     Cancer Maternal Grandmother  Heart Disease Paternal Grandmother     Esophageal Cancer Maternal Aunt        Vitals:  BP (!) 154/93   Pulse 78   Temp 97.5 °F (36.4 °C) (Oral)   Resp 16   Ht 5' 5\" (1.651 m)   Wt 143 lb 4.8 oz (65 kg)   SpO2 98%   BMI 23.85 kg/m²   Temp (24hrs), Av.3 °F (36.8 °C), Min:97.5 °F (36.4 °C), Max:99.1 °F (37.3 °C)    No results for input(s): POCGLU in the last 72 hours. I/O (24Hr):     Intake/Output Summary (Last 24 hours) at 2021 0817  Last data filed at 2021 0527  Gross per 24 hour   Intake 1875 ml   Output 3095 ml   Net -1220 ml       Labs:  Hematology:  Recent Labs     21  0549 21  2244 21  0701   WBC 20.6* 18.5* 17.7*   RBC 3.74* 4.02 4.12   HGB 11.1* 12.0 12.2   HCT 34.6* 36.5 37.6   MCV 92.5 90.8 91.3   MCH 29.7 29.9 29.6   MCHC 32.1 32.9 32.4   RDW 13.8 13.5 13.5    394 374   MPV 9.7 9.5 10.0     Chemistry:  Recent Labs     21  1154 21  0629 21  0549 21  0701   NA  --  139 134* 141   K  --  4.5 4.2 3.8   CL  --  104 102 98   CO2  --  23 25 28   GLUCOSE  --  131* 115* 117*   BUN  --  14 15 18   CREATININE  --  0.57 0.69 0.75   ANIONGAP  --  12 7* 15   LABGLOM  --  >60 >60 >60   GFRAA  --  >60 >60 >60   CALCIUM  --  8.9 8.7 9.1   PHOS  --  3.7  --   --    LACTACIDWB 0.8  --   --   --      Recent Labs     21  1154 21  0549   PROT  --  5.8*   LABALBU  --  2.7*   AST  --  32*   ALT  --  11   ALKPHOS  --  260*   LABGGT 318*  --    BILITOT  --  0.34   BILIDIR  --  0.22     ABG:  Lab Results   Component Value Date    POCPH 7.452 10/16/2020    POCPCO2 37.0 10/16/2020    POCPO2 80.7 10/16/2020    POCHCO3 25.9 10/16/2020    NBEA NOT REPORTED 10/16/2020    PBEA 2 10/16/2020    ZJP7FGT 27 10/16/2020    SOHI0PUV 96 10/16/2020    FIO2 INFORMATION NOT PROVIDED 2021     Lab Results   Component Value Date/Time    SPECIAL  R FOREARM 2 ML 2021 10:56 PM     Lab Results   Component Value Date/Time    CULTURE NO GROWTH 3 DAYS 2021 10:56 PM       Radiology:  XR ABDOMEN (KUB) (SINGLE AP VIEW)    Result Date: 7/22/2021  Mild dilation of the right hemicolon and patchy small bowel gas most likely due to ileus. XR CHEST PORTABLE    Result Date: 7/22/2021  No radiologic evidence of acute cardiopulmonary disease. CT CHEST ABDOMEN PELVIS W CONTRAST    Result Date: 7/21/2021  Probable acute diverticulitis or colitis left colon. No evidence of perforation or abscess at this time. Chronic pancreatitis. Multiple compression fractures some of which are new since the previous exam. Lingular ground-glass infiltrate. Recommend follow-up to resolution. Intra-atrial lipoma right atrium. Cardiac echo may be helpful. CT LUMBAR SPINE TRAUMA RECONSTRUCTION    Result Date: 7/21/2021  CT thoracic spine: 1. Decrease in height in T6 compared to prior study compatible with worsening compression with subacute etiology and mild protrusion of the dorsal aspect of T6. Narrowed T6-T7 neural foramina. 2.  Chronic superior height T8, T9, and T11 without interval change from prior study. 3.  Mild prevertebral soft tissue prominence T6 without evidence of abscess formation. CT lumbar spine: 1. No traumatic malalignment. 2.  Compression fracture superior L5 with subacute appearance. 3.  Calcifications in the abdomen incompletely included appearance suggesting pancreatic calcifications. RECOMMENDATIONS: Please reference CT chest, abdomen and pelvis of same day. CT THORACIC SPINE TRAUMA RECONSTRUCTION    Result Date: 7/21/2021  CT thoracic spine: 1. Decrease in height in T6 compared to prior study compatible with worsening compression with subacute etiology and mild protrusion of the dorsal aspect of T6. Narrowed T6-T7 neural foramina. 2.  Chronic superior height T8, T9, and T11 without interval change from prior study. 3.  Mild prevertebral soft tissue prominence T6 without evidence of abscess formation. CT lumbar spine: 1. No traumatic malalignment.  2. Compression fracture superior L5 with subacute appearance. 3.  Calcifications in the abdomen incompletely included appearance suggesting pancreatic calcifications. RECOMMENDATIONS: Please reference CT chest, abdomen and pelvis of same day. Physical Examination:        Physical Exam  Vitals and nursing note reviewed. Constitutional:       General: She is in acute distress. HENT:      Head: Normocephalic and atraumatic. Eyes:      Conjunctiva/sclera: Conjunctivae normal.      Pupils: Pupils are equal, round, and reactive to light. Cardiovascular:      Rate and Rhythm: Normal rate and regular rhythm. Heart sounds: No murmur heard. Pulmonary:      Effort: Pulmonary effort is normal. No accessory muscle usage or respiratory distress. Breath sounds: No stridor. Decreased breath sounds present. No wheezing, rhonchi or rales. Comments: On nasal canula  Abdominal:      General: Bowel sounds are decreased. There is no distension. Palpations: Abdomen is soft. Tenderness: There is generalized abdominal tenderness and tenderness in the right upper quadrant and epigastric area. There is no guarding. Musculoskeletal:         General: No tenderness. Skin:     General: Skin is warm and dry. Findings: Rash ( Diffuse rash on the back ) present. No erythema or lesion. Neurological:      Mental Status: She is alert and oriented to person, place, and time. Cranial Nerves: No cranial nerve deficit. Motor: No seizure activity. Psychiatric:         Speech: Speech normal.         Behavior: Behavior normal. Behavior is cooperative.          Assessment:        Hospital Problems         Last Modified POA    * (Principal) Sepsis ([address] 7/22/2021 Yes    Acute respiratory failure with hypoxia ([address] 7/22/2021 Yes    Diverticulitis of large intestine without perforation or abscess without bleeding 7/22/2021 Yes    Essential hypertension 7/21/2021 Yes    Tobacco use 7/21/2021 Yes Seizures ([address] 7/21/2021 Yes    COPD with acute exacerbation ([address] 7/22/2021 Yes    Recurrent major depressive disorder ([address] 7/21/2021 Yes    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24, the patient underwent 2 surgical resections without known recurrence 7/21/2021 Yes    AMAN (generalized anxiety disorder) 7/21/2021 Yes    Chronic peripheral neuropathic pain 7/21/2021 Yes    History of alcohol abuse 7/21/2021 Yes    Personal history of astrocytoma 7/21/2021 Yes    Marijuana abuse 7/21/2021 Yes    Closed fracture of fifth thoracic vertebra ([address] 7/22/2021 Yes    Elevated brain natriuretic peptide (BNP) level 7/22/2021 Yes    Elevated alkaline phosphatase level 7/22/2021 Yes    Chronic pancreatitis ([address] 7/22/2021 Yes    Erythema ab igne 7/22/2021 Yes    Compression fracture of thoracic vertebra ([address] 7/23/2021 Yes    Compression of lumbar vertebra ([address] 7/23/2021 Yes          Plan:        Sepsis: improving,  Likely secondary to colitis/diverticulitis, discontinue vancomycin and keep Zosyn, cultures are pending: Continue hydration    Colitis/diverticulitis/chronic pancreatitis/constipation/ ileus : bowel regimen per GI  Appreciate GI input     New fracture at T5 and L5,Chronic compression fracture in the thoracic vertebrae, there is worsening and compression fracture at T6 :  neurosurgery evaluation is in progress, no intervention planned for now   Patient denies any trauma or injury or falls  Patient has known osteoporosis    Acute hypoxemic respiratory failure: Likely secondary to her COPD along with pleural effusion pulmonary vascular congestion on repeat x-ray, Lasix IV  Continue BiPAP and supplemental oxygen .        COPD exacerbation: Continue breathing treatments, switch to IV steroids oral prednisone    Elevated BNP: Patient and echocardiogram end of last year with preserved ejection fraction and no significant valvular disease, echocardiogram is pending    Seizure disorder: Follows up with neurology as an outpatient, continue her home medications. History of alcohol use: Per the patient report she is sober for 6 months, mother does report patient did have a drink in March. Elevated alkaline phosphatase: elevated GGT , likely related to alcoholic liver disease  AFP and CEA 19-9 are negative    HTN: continue home medications assessment and plan the patient is so sick    HPL: continue home medications    Depression    Erythema Ab igne : On her back from prolonged use of heating Pads  .   DVT prophylaxis     Seizure like activity in a patient with H/O seizures : Continue Tegretol and Topamax, appreciate neurology  input    Patient condition continues to be critical , continue to monitor very closely    Discussed in detail with the patient, her mother and the nurse      Veronica Hernandez MD  7/25/2021  8:17 AM

## 2021-07-26 LAB
ABSOLUTE EOS #: <0.03 K/UL (ref 0–0.44)
ABSOLUTE IMMATURE GRANULOCYTE: 0.23 K/UL (ref 0–0.3)
ABSOLUTE LYMPH #: 1.57 K/UL (ref 1.1–3.7)
ABSOLUTE MONO #: 1.33 K/UL (ref 0.1–1.2)
ALLEN TEST: POSITIVE
ANION GAP SERPL CALCULATED.3IONS-SCNC: 16 MMOL/L (ref 9–17)
BASOPHILS # BLD: 0 % (ref 0–2)
BASOPHILS ABSOLUTE: <0.03 K/UL (ref 0–0.2)
BUN BLDV-MCNC: 18 MG/DL (ref 6–20)
BUN/CREAT BLD: ABNORMAL (ref 9–20)
C-REACTIVE PROTEIN: 269.3 MG/L (ref 0–5)
CALCIUM SERPL-MCNC: 8.4 MG/DL (ref 8.6–10.4)
CHLORIDE BLD-SCNC: 96 MMOL/L (ref 98–107)
CO2: 27 MMOL/L (ref 20–31)
CREAT SERPL-MCNC: 0.8 MG/DL (ref 0.5–0.9)
DIFFERENTIAL TYPE: ABNORMAL
EOSINOPHILS RELATIVE PERCENT: 0 % (ref 1–4)
FIO2: 100
GFR AFRICAN AMERICAN: >60 ML/MIN
GFR NON-AFRICAN AMERICAN: >60 ML/MIN
GFR SERPL CREATININE-BSD FRML MDRD: ABNORMAL ML/MIN/{1.73_M2}
GFR SERPL CREATININE-BSD FRML MDRD: ABNORMAL ML/MIN/{1.73_M2}
GLUCOSE BLD-MCNC: 114 MG/DL (ref 70–99)
HCT VFR BLD CALC: 35.5 % (ref 36.3–47.1)
HEMOGLOBIN: 11.5 G/DL (ref 11.9–15.1)
IMMATURE GRANULOCYTES: 1 %
LYMPHOCYTES # BLD: 8 % (ref 24–43)
MCH RBC QN AUTO: 29.5 PG (ref 25.2–33.5)
MCHC RBC AUTO-ENTMCNC: 32.4 G/DL (ref 28.4–34.8)
MCV RBC AUTO: 91 FL (ref 82.6–102.9)
MODE: ABNORMAL
MONOCYTES # BLD: 7 % (ref 3–12)
NEGATIVE BASE EXCESS, ART: ABNORMAL (ref 0–2)
NRBC AUTOMATED: 0 PER 100 WBC
O2 DEVICE/FLOW/%: ABNORMAL
PATIENT TEMP: ABNORMAL
PDW BLD-RTO: 13.5 % (ref 11.8–14.4)
PLATELET # BLD: 391 K/UL (ref 138–453)
PLATELET ESTIMATE: ABNORMAL
PMV BLD AUTO: 10 FL (ref 8.1–13.5)
POC HCO3: 32.3 MMOL/L (ref 21–28)
POC O2 SATURATION: 95 % (ref 94–98)
POC PCO2 TEMP: ABNORMAL MM HG
POC PCO2: 44.7 MM HG (ref 35–48)
POC PH TEMP: ABNORMAL
POC PH: 7.47 (ref 7.35–7.45)
POC PO2 TEMP: ABNORMAL MM HG
POC PO2: 71.9 MM HG (ref 83–108)
POSITIVE BASE EXCESS, ART: 8 (ref 0–3)
POTASSIUM SERPL-SCNC: 3.5 MMOL/L (ref 3.7–5.3)
RBC # BLD: 3.9 M/UL (ref 3.95–5.11)
RBC # BLD: ABNORMAL 10*6/UL
SAMPLE SITE: ABNORMAL
SEG NEUTROPHILS: 84 % (ref 36–65)
SEGMENTED NEUTROPHILS ABSOLUTE COUNT: 16.18 K/UL (ref 1.5–8.1)
SODIUM BLD-SCNC: 139 MMOL/L (ref 135–144)
TCO2 (CALC), ART: ABNORMAL MMOL/L (ref 22–29)
WBC # BLD: 19.3 K/UL (ref 3.5–11.3)
WBC # BLD: ABNORMAL 10*3/UL

## 2021-07-26 PROCEDURE — 86140 C-REACTIVE PROTEIN: CPT

## 2021-07-26 PROCEDURE — 80048 BASIC METABOLIC PNL TOTAL CA: CPT

## 2021-07-26 PROCEDURE — 6360000002 HC RX W HCPCS: Performed by: NURSE PRACTITIONER

## 2021-07-26 PROCEDURE — 94640 AIRWAY INHALATION TREATMENT: CPT

## 2021-07-26 PROCEDURE — 2060000000 HC ICU INTERMEDIATE R&B

## 2021-07-26 PROCEDURE — 97162 PT EVAL MOD COMPLEX 30 MIN: CPT

## 2021-07-26 PROCEDURE — 6370000000 HC RX 637 (ALT 250 FOR IP): Performed by: FAMILY MEDICINE

## 2021-07-26 PROCEDURE — 99232 SBSQ HOSP IP/OBS MODERATE 35: CPT | Performed by: PSYCHIATRY & NEUROLOGY

## 2021-07-26 PROCEDURE — 94667 MNPJ CHEST WALL 1ST: CPT

## 2021-07-26 PROCEDURE — 6370000000 HC RX 637 (ALT 250 FOR IP): Performed by: STUDENT IN AN ORGANIZED HEALTH CARE EDUCATION/TRAINING PROGRAM

## 2021-07-26 PROCEDURE — 6360000002 HC RX W HCPCS: Performed by: STUDENT IN AN ORGANIZED HEALTH CARE EDUCATION/TRAINING PROGRAM

## 2021-07-26 PROCEDURE — 97530 THERAPEUTIC ACTIVITIES: CPT

## 2021-07-26 PROCEDURE — 97535 SELF CARE MNGMENT TRAINING: CPT

## 2021-07-26 PROCEDURE — APPSS30 APP SPLIT SHARED TIME 16-30 MINUTES: Performed by: NURSE PRACTITIONER

## 2021-07-26 PROCEDURE — 2580000003 HC RX 258: Performed by: NURSE PRACTITIONER

## 2021-07-26 PROCEDURE — 6370000000 HC RX 637 (ALT 250 FOR IP): Performed by: NURSE PRACTITIONER

## 2021-07-26 PROCEDURE — 2700000000 HC OXYGEN THERAPY PER DAY

## 2021-07-26 PROCEDURE — 99255 IP/OBS CONSLTJ NEW/EST HI 80: CPT | Performed by: INTERNAL MEDICINE

## 2021-07-26 PROCEDURE — 36415 COLL VENOUS BLD VENIPUNCTURE: CPT

## 2021-07-26 PROCEDURE — 6360000002 HC RX W HCPCS: Performed by: FAMILY MEDICINE

## 2021-07-26 PROCEDURE — 85025 COMPLETE CBC W/AUTO DIFF WBC: CPT

## 2021-07-26 PROCEDURE — 6370000000 HC RX 637 (ALT 250 FOR IP): Performed by: INTERNAL MEDICINE

## 2021-07-26 PROCEDURE — 99232 SBSQ HOSP IP/OBS MODERATE 35: CPT | Performed by: FAMILY MEDICINE

## 2021-07-26 PROCEDURE — 94761 N-INVAS EAR/PLS OXIMETRY MLT: CPT

## 2021-07-26 PROCEDURE — 97167 OT EVAL HIGH COMPLEX 60 MIN: CPT

## 2021-07-26 PROCEDURE — 94660 CPAP INITIATION&MGMT: CPT

## 2021-07-26 PROCEDURE — 36600 WITHDRAWAL OF ARTERIAL BLOOD: CPT

## 2021-07-26 PROCEDURE — 82803 BLOOD GASES ANY COMBINATION: CPT

## 2021-07-26 RX ORDER — IPRATROPIUM BROMIDE AND ALBUTEROL SULFATE 2.5; .5 MG/3ML; MG/3ML
1 SOLUTION RESPIRATORY (INHALATION) 4 TIMES DAILY
Status: DISCONTINUED | OUTPATIENT
Start: 2021-07-26 | End: 2021-07-29

## 2021-07-26 RX ORDER — SPIRONOLACTONE 25 MG/1
50 TABLET ORAL DAILY
Status: DISCONTINUED | OUTPATIENT
Start: 2021-07-26 | End: 2021-08-03 | Stop reason: HOSPADM

## 2021-07-26 RX ORDER — IPRATROPIUM BROMIDE AND ALBUTEROL SULFATE 2.5; .5 MG/3ML; MG/3ML
1 SOLUTION RESPIRATORY (INHALATION)
Status: DISCONTINUED | OUTPATIENT
Start: 2021-07-26 | End: 2021-07-26

## 2021-07-26 RX ORDER — FUROSEMIDE 10 MG/ML
20 INJECTION INTRAMUSCULAR; INTRAVENOUS ONCE
Status: COMPLETED | OUTPATIENT
Start: 2021-07-26 | End: 2021-07-26

## 2021-07-26 RX ADMIN — PIPERACILLIN AND TAZOBACTAM 3375 MG: 3; .375 INJECTION, POWDER, FOR SOLUTION INTRAVENOUS at 10:10

## 2021-07-26 RX ADMIN — LACTULOSE 20 G: 20 SOLUTION ORAL at 09:35

## 2021-07-26 RX ADMIN — BUDESONIDE AND FORMOTEROL FUMARATE DIHYDRATE 2 PUFF: 160; 4.5 AEROSOL RESPIRATORY (INHALATION) at 20:20

## 2021-07-26 RX ADMIN — TIOTROPIUM BROMIDE INHALATION SPRAY 2 PUFF: 3.12 SPRAY, METERED RESPIRATORY (INHALATION) at 09:13

## 2021-07-26 RX ADMIN — FUROSEMIDE 20 MG: 10 INJECTION, SOLUTION INTRAMUSCULAR; INTRAVENOUS at 09:41

## 2021-07-26 RX ADMIN — FOLIC ACID 1 MG: 1 TABLET ORAL at 10:05

## 2021-07-26 RX ADMIN — FERROUS SULFATE TAB EC 325 MG (65 MG FE EQUIVALENT) 325 MG: 325 (65 FE) TABLET DELAYED RESPONSE at 20:26

## 2021-07-26 RX ADMIN — BUSPIRONE HYDROCHLORIDE 15 MG: 15 TABLET ORAL at 16:01

## 2021-07-26 RX ADMIN — SPIRONOLACTONE 50 MG: 25 TABLET ORAL at 09:41

## 2021-07-26 RX ADMIN — LORAZEPAM 2 MG: 2 INJECTION INTRAMUSCULAR; INTRAVENOUS at 21:42

## 2021-07-26 RX ADMIN — IPRATROPIUM BROMIDE AND ALBUTEROL SULFATE 1 AMPULE: .5; 3 SOLUTION RESPIRATORY (INHALATION) at 15:37

## 2021-07-26 RX ADMIN — CARBAMAZEPINE 200 MG: 200 TABLET ORAL at 16:01

## 2021-07-26 RX ADMIN — POLYETHYLENE GLYCOL 3350 17 G: 17 POWDER, FOR SOLUTION ORAL at 09:41

## 2021-07-26 RX ADMIN — ROPINIROLE HYDROCHLORIDE 1 MG: 1 TABLET, FILM COATED ORAL at 20:25

## 2021-07-26 RX ADMIN — PREGABALIN 200 MG: 100 CAPSULE ORAL at 20:25

## 2021-07-26 RX ADMIN — SODIUM CHLORIDE, PRESERVATIVE FREE 10 ML: 5 INJECTION INTRAVENOUS at 21:42

## 2021-07-26 RX ADMIN — BACLOFEN 10 MG: 10 TABLET ORAL at 09:36

## 2021-07-26 RX ADMIN — ENOXAPARIN SODIUM 40 MG: 40 INJECTION SUBCUTANEOUS at 09:36

## 2021-07-26 RX ADMIN — TOPIRAMATE 50 MG: 25 TABLET, FILM COATED ORAL at 20:25

## 2021-07-26 RX ADMIN — BUDESONIDE AND FORMOTEROL FUMARATE DIHYDRATE 2 PUFF: 160; 4.5 AEROSOL RESPIRATORY (INHALATION) at 09:15

## 2021-07-26 RX ADMIN — PREGABALIN 200 MG: 100 CAPSULE ORAL at 09:45

## 2021-07-26 RX ADMIN — TOPIRAMATE 50 MG: 25 TABLET, FILM COATED ORAL at 09:35

## 2021-07-26 RX ADMIN — BUSPIRONE HYDROCHLORIDE 15 MG: 15 TABLET ORAL at 09:36

## 2021-07-26 RX ADMIN — FERROUS SULFATE TAB EC 325 MG (65 MG FE EQUIVALENT) 325 MG: 325 (65 FE) TABLET DELAYED RESPONSE at 09:35

## 2021-07-26 RX ADMIN — CARBAMAZEPINE 200 MG: 200 TABLET ORAL at 00:11

## 2021-07-26 RX ADMIN — SODIUM CHLORIDE, PRESERVATIVE FREE 10 ML: 5 INJECTION INTRAVENOUS at 10:06

## 2021-07-26 RX ADMIN — HYDROXYZINE HYDROCHLORIDE 25 MG: 25 TABLET ORAL at 09:35

## 2021-07-26 RX ADMIN — CARBAMAZEPINE 200 MG: 200 TABLET ORAL at 20:26

## 2021-07-26 RX ADMIN — LORAZEPAM 2 MG: 2 INJECTION INTRAMUSCULAR; INTRAVENOUS at 10:32

## 2021-07-26 RX ADMIN — BACLOFEN 10 MG: 10 TABLET ORAL at 20:25

## 2021-07-26 RX ADMIN — AMLODIPINE BESYLATE 5 MG: 5 TABLET ORAL at 09:36

## 2021-07-26 RX ADMIN — IPRATROPIUM BROMIDE AND ALBUTEROL SULFATE 1 AMPULE: .5; 3 SOLUTION RESPIRATORY (INHALATION) at 20:22

## 2021-07-26 RX ADMIN — METHYLPREDNISOLONE SODIUM SUCCINATE 40 MG: 40 INJECTION, POWDER, FOR SOLUTION INTRAMUSCULAR; INTRAVENOUS at 09:36

## 2021-07-26 RX ADMIN — PIPERACILLIN AND TAZOBACTAM 3375 MG: 3; .375 INJECTION, POWDER, FOR SOLUTION INTRAVENOUS at 17:59

## 2021-07-26 RX ADMIN — POTASSIUM CHLORIDE 20 MEQ: 1500 TABLET, EXTENDED RELEASE ORAL at 09:35

## 2021-07-26 RX ADMIN — BACLOFEN 10 MG: 10 TABLET ORAL at 16:01

## 2021-07-26 RX ADMIN — CARBAMAZEPINE 200 MG: 200 TABLET ORAL at 09:36

## 2021-07-26 RX ADMIN — PREGABALIN 200 MG: 100 CAPSULE ORAL at 16:00

## 2021-07-26 RX ADMIN — PIPERACILLIN AND TAZOBACTAM 3375 MG: 3; .375 INJECTION, POWDER, FOR SOLUTION INTRAVENOUS at 03:21

## 2021-07-26 RX ADMIN — LACTULOSE 20 G: 20 SOLUTION ORAL at 16:01

## 2021-07-26 RX ADMIN — BUSPIRONE HYDROCHLORIDE 15 MG: 15 TABLET ORAL at 20:26

## 2021-07-26 ASSESSMENT — PAIN DESCRIPTION - LOCATION
LOCATION: BACK;ABDOMEN
LOCATION: BACK;ABDOMEN
LOCATION: BACK

## 2021-07-26 ASSESSMENT — ENCOUNTER SYMPTOMS
NAUSEA: 0
CONSTIPATION: 0
COUGH: 1
WHEEZING: 0
SHORTNESS OF BREATH: 1
DIARRHEA: 0
BACK PAIN: 1
WHEEZING: 1
VOMITING: 0
ABDOMINAL PAIN: 1
BLOOD IN STOOL: 0
CHEST TIGHTNESS: 0

## 2021-07-26 ASSESSMENT — PAIN DESCRIPTION - PAIN TYPE
TYPE: CHRONIC PAIN;ACUTE PAIN
TYPE: CHRONIC PAIN
TYPE: ACUTE PAIN;CHRONIC PAIN

## 2021-07-26 ASSESSMENT — PAIN SCALES - GENERAL
PAINLEVEL_OUTOF10: 4

## 2021-07-26 ASSESSMENT — PAIN - FUNCTIONAL ASSESSMENT: PAIN_FUNCTIONAL_ASSESSMENT: PREVENTS OR INTERFERES SOME ACTIVE ACTIVITIES AND ADLS

## 2021-07-26 ASSESSMENT — PAIN DESCRIPTION - DESCRIPTORS: DESCRIPTORS: ACHING;DISCOMFORT

## 2021-07-26 NOTE — PLAN OF CARE
Problem: RESPIRATORY  Intervention: Respiratory assessment  Note: LIZBET BOLAND, PPatient Assessment complete. Sepsis ([facility] [A41.9] . Vitals:    07/26/21 1015   BP:    Pulse:    Resp: (!) 36   Temp:    SpO2: 94%   . Patients home meds are   Prior to Admission medications    Medication Sig Start Date End Date Taking? Authorizing Provider   acamprosate (CAMPRAL) 333 MG tablet Take 2 tablets by mouth 3 times daily 7/16/21 8/15/21  LOI Maharaj NP   sertraline (ZOLOFT) 50 MG tablet Take 3 tablets by mouth daily for 7 days, THEN 2 tablets daily for 7 days, THEN 1 tablet daily for 7 days. 7/16/21 8/6/21  LOI Maharaj NP   FLUoxetine (PROZAC) 20 MG tablet Take 0.5 tablets by mouth daily for 7 days, THEN 1 tablet daily for 23 days. 7/16/21 8/15/21  LOI Maharaj NP   traZODone (DESYREL) 50 MG tablet TAKE 1 AND 1/2 TABLET BY MOUTH ONCE NIGHTLY AS NEEDED FOR SLEEP 7/16/21   LOI Maharaj NP   thermotabs (MEDI-LYTE) TABS tablet Take 1 tablet by mouth daily for 3 days 7/9/21 7/12/21  LOI Quijano NP   carBAMazepine (TEGRETOL XR) 200 MG extended release tablet Take 1 tablet by mouth 2 times daily 7/6/21   Miguel A Reich MD   topiramate (TOPAMAX) 25 MG tablet Take 2 tablets by mouth daily 7/6/21   Miguel A Reich MD   pregabalin (LYRICA) 200 MG capsule Take 1 capsule by mouth 3 times daily for 90 days.  7/2/21 9/30/21  Miguel A Reich MD   KLOR-CON M20 20 MEQ extended release tablet TAKE ONE TABLET BY MOUTH DAILY 6/24/21   Ludivina Huang MD   amLODIPine (NORVASC) 5 MG tablet TAKE ONE TABLET BY MOUTH DAILY 6/3/21   Ludivina Huang MD   busPIRone (BUSPAR) 15 MG tablet Take 1 tablet by mouth 3 times daily 5/5/21   Ludivina Huang MD   baclofen (LIORESAL) 10 MG tablet Take 1 tablet by mouth 3 times daily 5/25/21   Ludivina Huang MD   ibuprofen (ADVIL;MOTRIN) 600 MG tablet Take 1 tablet by mouth 3 times daily as needed for Pain 5/25/21   Ludivina Huang MD sertraline (ZOLOFT) 100 MG tablet Take 2 tablets by mouth daily 5/17/21   Chanda Erie, APRN - NP   ferrous sulfate (IRON 325) 325 (65 Fe) MG tablet Take 1 tablet by mouth 2 times daily 4/22/21   Ludivina Hylton MD   rOPINIRole (REQUIP) 1 MG tablet Take 1 tablet by mouth nightly 4/1/21   Ludivina Hylton MD   budesonide-formoterol [facility]) 160-4.5 MCG/ACT AERO Inhale 2 puffs into the lungs 2 times daily 3/31/21   Ludivina Hylton MD   hydrOXYzine (ATARAX) 50 MG tablet TAKE ONE TABLET BY MOUTH THREE TIMES A DAY 3/12/21   Ludivina Hylton MD   tiotropium (SPIRIVA RESPIMAT) 2.5 MCG/ACT AERS inhaler Inhale 2 puffs into the lungs daily 2/26/21 7/8/21  Omar Mckeon MD   sodium chloride (ALTAMIST SPRAY) 0.65 % nasal spray 1 spray by Nasal route as needed for Congestion 12/28/20   Ludivina Hylton MD   ACETAMINOPHEN EXTRA STRENGTH 500 MG tablet Take 500 mg by mouth 3 times daily as needed 5/8/20   Historical MD Keyonna   albuterol sulfate HFA (VENTOLIN HFA) 108 (90 Base) MCG/ACT inhaler Inhale 2 puffs into the lungs 4 times daily as needed for Wheezing 6/11/20   Ludivina Hylton MD   vitamin B-1 (THIAMINE) 100 MG tablet Take 1 tablet by mouth daily 7/12/19   Ludivina Hylton MD   vitamin D (ERGOCALCIFEROL) 69573 units CAPS capsule Take 1 capsule by mouth once a week 6/19/19   Ludivina Hylton MD   folic acid (FOLVITE) 1 MG tablet Take 1 tablet by mouth daily 6/19/19   Thompson Mejia MD     Assessment     Admit for Diverticulitis, Colitis and ileus. Sepsis  Has chronic pain with new thoracic compression fracture from osteoporosis. History of COPD, was being treated for exacerbation. Was on IV solumedrol now on Prednisone. Has no issue with wheezing. On home respirratory medications. Current issue is with hypoxemia now requiring bipap and high flow. CXR with vascular congestion. Will continue current treatment. Wean high flow cannula and bipap.        RR 20  Breath Sounds: clear      Bronchodilator assessment at level  1  meds  Hyperinflation assessment at level   Secretion Management assessment at level      [x]    Bronchodilator Assessment  BRONCHODILATOR ASSESSMENT SCORE  Score 0 1 2 3 4 5   Breath Sounds   [x]  Patient Baseline []  No Wheeze good aeration []  Faint, scattered wheezing, good aeration []  Expiratory Wheezing and or moderately diminished []  Insp/Exp wheeze and/or very diminished []  Insp/Exp and/ or marked distress   Respiratory Rate   [x]  Patient Baseline []  Less than 20 []  Less than 20 []  20-25 []  Greater than 25 []  Greater than 25   Peak flow % of Pred or PB [x]  NA   []  Greater than 90%  []  81-90% []  71-80% []  Less than or equal to 70%  or unable to perform []  Unable due to Respiratory Distress   Dyspnea re [x]  Patient Baseline []  No SOB []  No SOB []  SOB on exertion []  SOB min activity []  At rest/acute   e FEV% Predicted       [x]  NA []  Above 69%  []  Unable []  Above 60-69%  []  Unable []  Above 50-59%  []  Unable []  Above 35-49%  []  Unable []  Less than 35%  []  Unable                       LIZBET BOLAND RCP  10:26 AM      Intervention: Nebulizer management  Note: BRONCHOSPASM/BRONCHOCONSTRICTION     [x]         IMPROVE AERATION/BREATH SOUNDS  [x]   ADMINISTER BRONCHODILATOR THERAPY AS APPROPRIATE  [x]   ASSESS BREATH SOUNDS  [x]   IMPLEMENT AEROSOL/MDI PROTOCOL  [x]   PATIENT EDUCATION AS NEEDED     Intervention: Support non-invasive mechanical ventilation  Note: NON INVASIVE VENTILATION  PROVIDE OPTIMAL VENTILATION/ACCEPTABLE SP02  IMPLEMENT NON INVASIVE VENTILATION PROTOCOL  ASSESSMENT SKIN INTEGRITY  PATIENT EDUCATION AS NEEDED  BIPAP AS NEEDED   Intervention: Provide oxygen therapy  Note:   PROVIDE ADEQUATE OXYGENATION WITH ACCEPTABLE SP02/ABG'S    [x]  IDENTIFY APPROPRIATE OXYGEN THERAPY  [x]   MONITOR SP02/ABG'S AS NEEDED   [x]   PATIENT EDUCATION AS NEEDED

## 2021-07-26 NOTE — PROGRESS NOTES
Physical Therapy    Facility/Department: [facility] CAR 2  Initial Assessment    NAME: Ni Mccann  : 1970  MRN: [id number]    Chief Complaint   Patient presents with    Back Pain     states chronic back pain becoming worse.  has recently started physical therapy       Date of Service: 2021    Discharge Recommendations:    Further therapy recommended at discharge. PT Equipment Recommendations  Other: CTA, RW required to safetly attempt amb with assist at this time. Assessment   Body structures, Functions, Activity limitations: Decreased functional mobility ; Decreased strength;Decreased endurance;Decreased balance;Decreased safe awareness  Assessment: Pt modA bed mobility, CGA transfers, and amb 2' R lateral Luis. Pt would benefit from continued acute PT to address deficits. Prognosis: Good  Decision Making: Medium Complexity  PT Education: Plan of Care;PT Role;General Safety;Transfer Training;Gait Training  REQUIRES PT FOLLOW UP: Yes  Activity Tolerance  Activity Tolerance: Patient Tolerated treatment well;Patient limited by fatigue;Patient limited by endurance       Patient Diagnosis(es): The primary encounter diagnosis was Septicemia ([address] Diagnoses of Compression fracture of thoracic vertebra, initial encounter, unspecified thoracic vertebral level (HCC) and Compression fracture of lumbar vertebra, initial encounter, unspecified lumbar vertebral level ([address] were also pertinent to this visit.      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 has a past surgical history that includes Hysterectomy (2003); Brain tumor excision (1989); fracture surgery (Left, 7-3-13); fracture surgery (Right); Upper gastrointestinal endoscopy (N/A, 10/22/2020); Colonoscopy (N/A, 10/22/2020); Endoscopic ultrasonography, GI (N/A, 12/9/2020); Upper gastrointestinal endoscopy (N/A, 4/5/2021); and Hand surgery. Restrictions  Restrictions/Precautions  Restrictions/Precautions: Fall Risk, Up as Tolerated, Seizure  Required Braces or Orthoses?: No  Position Activity Restriction  Other position/activity restrictions: no activity restrictions per Rupesh Vera with NS. Maintain SpO2 >94%, HiFlo O2. up with assist  Vision/Hearing  Vision: Impaired  Vision Exceptions: Wears glasses for reading  Hearing: Within functional limits     Subjective  General  Chart Reviewed: Yes  Patient assessed for rehabilitation services?: Yes  Response To Previous Treatment: Not applicable  Family / Caregiver Present: Yes (mother)  Follows Commands: Within Functional Limits  General Comment  Comments: OT co-eval  Subjective  Subjective: RN and pt agreeable to PT. Pt alert in bed upon arrival.  Pain Screening  Patient Currently in Pain: Yes  Pain Assessment  Pain Assessment: 0-10  Pain Level: 4  Pain Type: Acute pain;Chronic pain  Pain Location: Back; Abdomen  Non-Pharmaceutical Pain Intervention(s): Ambulation/Increased Activity; Distraction; Emotional support  Response to Pain Intervention: Patient Satisfied  Vital Signs  Patient Currently in Pain: Yes  Pre Treatment Pain Screening  Intervention List: Patient able to continue with treatment    Orientation  Orientation  Overall Orientation Status: Impaired  Orientation Level: Oriented to place;Oriented to time;Oriented to person  Social/Functional History  Social/Functional History  Lives With: Alone  Type of Home: House  Home Layout: One level  Home Access: Stairs to enter without rails  Entrance Stairs - Number of Steps: 2  Entrance Stairs - Rails: None  Bathroom Shower/Tub: Tub/Shower unit, Doors  Bathroom Toilet: Standard  Bathroom Equipment:  (toilet's between tub and sink)  Bathroom Accessibility: Accessible  Home Equipment:  (no DME use at baseline)  ADL Assistance: [address] [place] [address] Avenue: [address] [place] [address] Responsibilities: Yes  Ambulation Assistance: Independent  Transfer Assistance: Independent  Active : No  Patient's  Info: mother  Mode of Transportation: Family, Cab  Occupation: Unemployed  IADL Comments: Pt started outpatient physical therapy , had first vist on 7/20/21 and next day was admitted to the hospital.  Additional Comments: Pt mother present to provide social/functional hx d/t pt cognitive level, [place], and fatigue    Objective          AROM RLE (degrees)  RLE AROM: WFL  AROM LLE (degrees)  LLE AROM : WFL  AROM RUE (degrees)  RUE AROM : WFL  AROM LUE (degrees)  LUE AROM : WFL  Strength RLE  Comment: 4-  Strength LLE  Comment: 4-  Strength RUE  Comment: minimum antigravity, see OT  Strength LUE  Comment: minimum antigravity, see OT     Sensation  Overall Sensation Status: Impaired (Pt reports numbness/ tingling in B hads and feet, chronic d/t neuropathy)  Bed mobility  Supine to Sit: Moderate assistance (for BLE and trunk progression)  Sit to Supine: Contact guard assistance  Scooting: Minimal assistance  Comment: HOB elevated ~40degress  Transfers  Sit to Stand: Contact guard assistance  Stand to sit: Contact guard assistance  Comment: cues required on technique  Ambulation  Ambulation?: Yes  Ambulation 1  Surface: level tile  Device: Rolling Walker  Assistance: Minimal assistance  Quality of Gait: sloowed, small steps, no LOB, cueing, assist with Rw use  Distance: 2' R lateral  Stairs/Curb  Stairs?: No     Balance  Posture: Fair  Sitting - Static: Good  Sitting - Dynamic: Good;-  Standing - Static: Fair;+  Standing - Dynamic: Fair  Comments: RW used while assessing standing balance        Plan   Plan  Times per week: 5-6x/wk  Current Treatment Recommendations: Strengthening, Balance Training, Endurance Training, Functional Mobility Training, Transfer Training, Gait Training, Stair training, Home Exercise Program, Safety Education & Training, Patient/Caregiver Education & Training, Equipment Evaluation, Education, & procurement  Safety Devices  Type of devices: Call light within reach, Nurse notified, Gait belt, Patient at risk for falls, Left in bed, All fall risk precautions in place, Bed alarm in place  Restraints  Initially in place: No      AM-PAC Score  AM-PAC Inpatient Mobility Raw Score : 14 (07/26/21 1309)  AM-PAC Inpatient T-Scale Score : 38.1 (07/26/21 1309)  Mobility Inpatient CMS 0-100% Score: 61.29 (07/26/21 1309)  Mobility Inpatient CMS G-Code Modifier : CL (07/26/21 1309)          Goals  Short term goals  Time Frame for Short term goals: 14 visits  Short term goal 1: Pt will be Sneha bed mobility  Short term goal 2: Pt will be Sneha transfers  Short term goal 3: Pt will be Sneha amb 200' rW or least restrictive AD  Short term goal 4: Pt will navigate 3 steps Sneha       Therapy Time   Individual Concurrent Group Co-treatment   Time In 1038         Time Out 1109         Minutes 31         Timed Code Treatment Minutes: 8 Minutes       Marianela Pratt, PT

## 2021-07-26 NOTE — PLAN OF CARE
Problem: RESPIRATORY  Intervention: Respiratory assessment  7/26/2021 1544 by Merlene Ross RCP  Note: JOSE DE JESUS NAVAatient Assessment complete. Sepsis ([address] [A41.9] . Vitals:    07/26/21 1538   BP:    Pulse:    Resp: 26   Temp:    SpO2: 94%   . Patients home meds are   Prior to Admission medications    Medication Sig Start Date End Date Taking? Authorizing Provider   acamprosate (CAMPRAL) 333 MG tablet Take 2 tablets by mouth 3 times daily 7/16/21 8/15/21  LOI Cabral NP   sertraline (ZOLOFT) 50 MG tablet Take 3 tablets by mouth daily for 7 days, THEN 2 tablets daily for 7 days, THEN 1 tablet daily for 7 days. 7/16/21 8/6/21  LOI Cabral NP   FLUoxetine (PROZAC) 20 MG tablet Take 0.5 tablets by mouth daily for 7 days, THEN 1 tablet daily for 23 days. 7/16/21 8/15/21  LOI Cabral NP   traZODone (DESYREL) 50 MG tablet TAKE 1 AND 1/2 TABLET BY MOUTH ONCE NIGHTLY AS NEEDED FOR SLEEP 7/16/21   LOI Cabral NP   thermotabs (MEDI-LYTE) TABS tablet Take 1 tablet by mouth daily for 3 days 7/9/21 7/12/21  LOI Padilla NP   carBAMazepine (TEGRETOL XR) 200 MG extended release tablet Take 1 tablet by mouth 2 times daily 7/6/21   Cassy Morgan MD   topiramate (TOPAMAX) 25 MG tablet Take 2 tablets by mouth daily 7/6/21   Cassy Morgan MD   pregabalin (LYRICA) 200 MG capsule Take 1 capsule by mouth 3 times daily for 90 days.  7/2/21 9/30/21  Cassy Morgan MD   KLOR-CON M20 20 MEQ extended release tablet TAKE ONE TABLET BY MOUTH DAILY 6/24/21   Ludivina Pete MD   amLODIPine (NORVASC) 5 MG tablet TAKE ONE TABLET BY MOUTH DAILY 6/3/21   Ludivina Pete MD   busPIRone (BUSPAR) 15 MG tablet Take 1 tablet by mouth 3 times daily 5/5/21   Ludivina Pete MD   baclofen (LIORESAL) 10 MG tablet Take 1 tablet by mouth 3 times daily 5/25/21   Ludivina Pete MD   ibuprofen (ADVIL;MOTRIN) 600 MG tablet Take 1 tablet by mouth 3 times daily as needed for Pain 5/25/21   Ludivina Ornelas MD   sertraline (ZOLOFT) 100 MG tablet Take 2 tablets by mouth daily 5/17/21   LOI Leon NP   ferrous sulfate (IRON 325) 325 (65 Fe) MG tablet Take 1 tablet by mouth 2 times daily 4/22/21   Ludivina Ornelas MD   rOPINIRole (REQUIP) 1 MG tablet Take 1 tablet by mouth nightly 4/1/21   Ludivina Ornelas MD   budesonide-formoterol [facility]) 160-4.5 MCG/ACT AERO Inhale 2 puffs into the lungs 2 times daily 3/31/21   Ludivina Ornelas MD   hydrOXYzine (ATARAX) 50 MG tablet TAKE ONE TABLET BY MOUTH THREE TIMES A DAY 3/12/21   Ludivina Ornelas MD   tiotropium (SPIRIVA RESPIMAT) 2.5 MCG/ACT AERS inhaler Inhale 2 puffs into the lungs daily 2/26/21 7/8/21  Mckinley Mcqueen MD   sodium chloride (ALTAMIST SPRAY) 0.65 % nasal spray 1 spray by Nasal route as needed for Congestion 12/28/20   Ludivina Ornelas MD   ACETAMINOPHEN EXTRA STRENGTH 500 MG tablet Take 500 mg by mouth 3 times daily as needed 5/8/20   Historical Provider, MD   albuterol sulfate HFA (VENTOLIN HFA) 108 (90 Base) MCG/ACT inhaler Inhale 2 puffs into the lungs 4 times daily as needed for Wheezing 6/11/20   Ludivina Ornelas MD   vitamin B-1 (THIAMINE) 100 MG tablet Take 1 tablet by mouth daily 7/12/19   Ludivina Ornelas MD   vitamin D (ERGOCALCIFEROL) 37630 units CAPS capsule Take 1 capsule by mouth once a week 6/19/19   Ludivina Ornelas MD   folic acid (FOLVITE) 1 MG tablet Take 1 tablet by mouth daily 6/19/19   Ludivina Ornelas MD   Assessment     Issue with bibasilar atelectasis with ineffective cough related to back pain from compressive thoracic fractures. Dr. Angela Blair wants patient to be placed on [facility] Device with bronchodilators.   High risk for intubation with high PF ratio on ABG     CXR on 07/25          RR 26  Breath Sounds: rhonchi      Secretion Management assessment at level  2    [x]  Secretion Management Assessment  Score 1 2 3   Bilateral Breath Sounds   []  Occasional Rhonchi [x]  Scattered Rhonchi []  Course Rhonchi and/or poor aeration   Sputum    []  Small amount of thin secretions [x]  Moderate amount of viscous secretions []  Cop[place], Viscious Yellow/ Secretions   CXR as reported by physician []  clear  []  Unavailable [x]  Infiltrates and/or consolidation  []  Unavailable []  Mucus Plugging and or lobar consolidation  []  Unavailable   Cough []  Strong, productive cough [x]  Weak productive cough [x]  No cough or weak non-productive cough   LIZBET BOLAND RCP  3:45 PM                 7/26/2021 1025 by Murray Lyman RCP  Note: JOSE DE JESUS NAVAatient Assessment complete. Sepsis ([facility] [A41.9] . Vitals:    07/26/21 1015   BP:    Pulse:    Resp: (!) 36   Temp:    SpO2: 94%   . Patients home meds are   Prior to Admission medications    Medication Sig Start Date End Date Taking? Authorizing Provider   acamprosate (CAMPRAL) 333 MG tablet Take 2 tablets by mouth 3 times daily 7/16/21 8/15/21  LOI Finn NP   sertraline (ZOLOFT) 50 MG tablet Take 3 tablets by mouth daily for 7 days, THEN 2 tablets daily for 7 days, THEN 1 tablet daily for 7 days. 7/16/21 8/6/21  LOI Finn NP   FLUoxetine (PROZAC) 20 MG tablet Take 0.5 tablets by mouth daily for 7 days, THEN 1 tablet daily for 23 days. 7/16/21 8/15/21  LOI Finn NP   traZODone (DESYREL) 50 MG tablet TAKE 1 AND 1/2 TABLET BY MOUTH ONCE NIGHTLY AS NEEDED FOR SLEEP 7/16/21   LOI Finn NP   thermotabs (MEDI-LYTE) TABS tablet Take 1 tablet by mouth daily for 3 days 7/9/21 7/12/21  LOI Kelley NP   carBAMazepine (TEGRETOL XR) 200 MG extended release tablet Take 1 tablet by mouth 2 times daily 7/6/21   Samia Wong MD   topiramate (TOPAMAX) 25 MG tablet Take 2 tablets by mouth daily 7/6/21   Samia Wong MD   pregabalin (LYRICA) 200 MG capsule Take 1 capsule by mouth 3 times daily for 90 days.  7/2/21 9/30/21  MD MARY Gallagher-CON M20 20 MEQ extended release tablet TAKE ONE TABLET BY MOUTH DAILY 6/24/21   Ludivina Walden MD   amLODIPine (NORVASC) 5 MG tablet TAKE ONE TABLET BY MOUTH DAILY 6/3/21   Ludivina Walden MD   busPIRone (BUSPAR) 15 MG tablet Take 1 tablet by mouth 3 times daily 5/5/21   Ludivina Walden MD   baclofen (LIORESAL) 10 MG tablet Take 1 tablet by mouth 3 times daily 5/25/21   Ludivina Walden MD   ibuprofen (ADVIL;MOTRIN) 600 MG tablet Take 1 tablet by mouth 3 times daily as needed for Pain 5/25/21   Ludivina Walden MD   sertraline (ZOLOFT) 100 MG tablet Take 2 tablets by mouth daily 5/17/21   LOI Finch NP   ferrous sulfate (IRON 325) 325 (65 Fe) MG tablet Take 1 tablet by mouth 2 times daily 4/22/21   Ludivina Walden MD   rOPINIRole (REQUIP) 1 MG tablet Take 1 tablet by mouth nightly 4/1/21   Ludivina Walden MD   budesonide-formoterol [facility]) 160-4.5 MCG/ACT AERO Inhale 2 puffs into the lungs 2 times daily 3/31/21   Ludivina Walden MD   hydrOXYzine (ATARAX) 50 MG tablet TAKE ONE TABLET BY MOUTH THREE TIMES A DAY 3/12/21   Ludivina Walden MD   tiotropium (SPIRIVA RESPIMAT) 2.5 MCG/ACT AERS inhaler Inhale 2 puffs into the lungs daily 2/26/21 7/8/21  Abdulaziz Leonard MD   sodium chloride (ALTAMIST SPRAY) 0.65 % nasal spray 1 spray by Nasal route as needed for Congestion 12/28/20   Ludivina Walden MD   ACETAMINOPHEN EXTRA STRENGTH 500 MG tablet Take 500 mg by mouth 3 times daily as needed 5/8/20   Historical MD Keyonna   albuterol sulfate HFA (VENTOLIN HFA) 108 (90 Base) MCG/ACT inhaler Inhale 2 puffs into the lungs 4 times daily as needed for Wheezing 6/11/20   Ludivina Walden MD   vitamin B-1 (THIAMINE) 100 MG tablet Take 1 tablet by mouth daily 7/12/19   Ludivina Walden MD   vitamin D (ERGOCALCIFEROL) 76060 units CAPS capsule Take 1 capsule by mouth once a week 6/19/19   Ludivina Walden MD   folic acid (FOLVITE) 1 MG tablet Take 1 tablet by mouth daily 6/19/19   Ludivina Walden MD     Assessment     Admit for Diverticulitis, Colitis and ileus. Sepsis  Has chronic pain with new thoracic compression fracture from osteoporosis. History of COPD, was being treated for exacerbation. Was on IV solumedrol now on Prednisone. Has no issue with wheezing. On home respirratory medications. Current issue is with hypoxemia now requiring bipap and high flow. CXR with vascular congestion. Will continue current treatment. Wean high flow cannula and bipap.        RR 20  Breath Sounds: clear      Bronchodilator assessment at level  1  meds  Hyperinflation assessment at level   Secretion Management assessment at level      [x]    Bronchodilator Assessment  BRONCHODILATOR ASSESSMENT SCORE  Score 0 1 2 3 4 5   Breath Sounds   [x]  Patient Baseline []  No Wheeze good aeration []  Faint, scattered wheezing, good aeration []  Expiratory Wheezing and or moderately diminished []  Insp/Exp wheeze and/or very diminished []  Insp/Exp and/ or marked distress   Respiratory Rate   [x]  Patient Baseline []  Less than 20 []  Less than 20 []  20-25 []  Greater than 25 []  Greater than 25   Peak flow % of Pred or PB [x]  NA   []  Greater than 90%  []  81-90% []  71-80% []  Less than or equal to 70%  or unable to perform []  Unable due to Respiratory Distress   Dyspnea re [x]  Patient Baseline []  No SOB []  No SOB []  SOB on exertion []  SOB min activity []  At rest/acute   e FEV% Predicted       [x]  NA []  Above 69%  []  Unable []  Above 60-69%  []  Unable []  Above 50-59%  []  Unable []  Above 35-49%  []  Unable []  Less than 35%  []  Unable                       LIZBET BOLAND RCP  10:26 AM

## 2021-07-26 NOTE — PROGRESS NOTES
PATIENT REFUSES TO WEAR BIPAP     [x] Risks and benefits explained to patient   [x] Patient refuses to wear Bipap stating no. Pt also appears very agitated and confused and is known to take her Bipap off quickly. [x] Patient verbalizes understanding of information presented.

## 2021-07-26 NOTE — CARE COORDINATION
Transitional planning-called and talked with mother Yane Storm. She decided [place] [phone] for SNF. Referral faxed and left message with Allyssa Krishnamurthy.

## 2021-07-26 NOTE — PROGRESS NOTES
NEUROLOGY INPATIENT PROGRESS NOTE    7/26/2021         Current Exam:     Chart reviewed. Discussed with RN. Patient is oriented but easily irritable and tearful during exam. Does not answer all questions appropriately. O2 sats dropping during assessment, patient requiring bipap placement. No seizure activity overnight. She is moaning and appears to be uncomfortable secondary to pain. EEG is pending. Brief History:    Claudia Ventura is a  45 y.o. female with H/O alcohol abuse, bipolar disorder, depression, HTN, seizures, who was admitted on 7/21/2021 with abdominal pain, found to have diverticulitis. She also had reports of back pain and was found to have L5 vertebral body compression fracture; neurosurgery was consulted. Neurology is consulted due to seizure activity described as body stiffening and jerking lasting 30 seconds. She has a history of seizure disorder described as staring episodes; follows with [facility] and has a history of posterior fossa astrocytoma resection at age 24. Last month her Tegretol was decreased from 200mg TID dosing to BID and she was also placed on Topamax with the plan to wean her from Tegretol and increase Topamax. While inpatient her Tegretol was taken back to TID. CT head with no acute findings, suboccipital craniectomy. EEG ordered and is pending. No current facility-administered medications on file prior to encounter. Current Outpatient Medications on File Prior to Encounter   Medication Sig Dispense Refill    acamprosate (CAMPRAL) 333 MG tablet Take 2 tablets by mouth 3 times daily 180 tablet 0    sertraline (ZOLOFT) 50 MG tablet Take 3 tablets by mouth daily for 7 days, THEN 2 tablets daily for 7 days, THEN 1 tablet daily for 7 days. 42 tablet 0    FLUoxetine (PROZAC) 20 MG tablet Take 0.5 tablets by mouth daily for 7 days, THEN 1 tablet daily for 23 days.  27 tablet 0    traZODone (DESYREL) 50 MG tablet TAKE 1 AND 1/2 TABLET BY MOUTH ONCE NIGHTLY AS NEEDED FOR SLEEP 45 tablet 0    thermotabs (MEDI-LYTE) TABS tablet Take 1 tablet by mouth daily for 3 days 3 tablet 0    carBAMazepine (TEGRETOL XR) 200 MG extended release tablet Take 1 tablet by mouth 2 times daily 90 tablet 0    topiramate (TOPAMAX) 25 MG tablet Take 2 tablets by mouth daily 60 tablet 3    pregabalin (LYRICA) 200 MG capsule Take 1 capsule by mouth 3 times daily for 90 days.  90 capsule 2    KLOR-CON M20 20 MEQ extended release tablet TAKE ONE TABLET BY MOUTH DAILY 30 tablet 2    amLODIPine (NORVASC) 5 MG tablet TAKE ONE TABLET BY MOUTH DAILY 90 tablet 0    busPIRone (BUSPAR) 15 MG tablet Take 1 tablet by mouth 3 times daily      baclofen (LIORESAL) 10 MG tablet Take 1 tablet by mouth 3 times daily 60 tablet 1    ibuprofen (ADVIL;MOTRIN) 600 MG tablet Take 1 tablet by mouth 3 times daily as needed for Pain 30 tablet 0    [DISCONTINUED] sertraline (ZOLOFT) 100 MG tablet Take 2 tablets by mouth daily 60 tablet 0    ferrous sulfate (IRON 325) 325 (65 Fe) MG tablet Take 1 tablet by mouth 2 times daily 180 tablet 1    rOPINIRole (REQUIP) 1 MG tablet Take 1 tablet by mouth nightly 90 tablet 1    budesonide-formoterol (SYMBICORT) 160-4.5 MCG/ACT AERO Inhale 2 puffs into the lungs 2 times daily 3 Inhaler 1    hydrOXYzine (ATARAX) 50 MG tablet TAKE ONE TABLET BY MOUTH THREE TIMES A DAY 90 tablet 3    tiotropium (SPIRIVA RESPIMAT) 2.5 MCG/ACT AERS inhaler Inhale 2 puffs into the lungs daily 1 Inhaler 11    sodium chloride (ALTAMIST SPRAY) 0.65 % nasal spray 1 spray by Nasal route as needed for Congestion 1 Bottle 3    ACETAMINOPHEN EXTRA STRENGTH 500 MG tablet Take 500 mg by mouth 3 times daily as needed      albuterol sulfate HFA (VENTOLIN HFA) 108 (90 Base) MCG/ACT inhaler Inhale 2 puffs into the lungs 4 times daily as needed for Wheezing 1 Inhaler 5    vitamin B-1 (THIAMINE) 100 MG tablet Take 1 tablet by mouth daily 30 tablet 3    vitamin D (ERGOCALCIFEROL) 18059 units CAPS capsule Take 1 capsule by mouth once a week 12 capsule 1    folic acid (FOLVITE) 1 MG tablet Take 1 tablet by mouth daily 90 tablet 1       Allergies: Travon Louise has No Known Allergies.     Past Medical History:   Diagnosis Date    Acute respiratory failure with hypoxia ([address] 10/16/2020    Alcohol withdrawal syndrome, with delirium ([address] 12/14/2019    Alcoholism ([address]     Anemia 10/2020    GI bleed    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24, the patient underwent 2 surgical resections without known recurrence 10/23/2020    Closed fracture of lateral portion of left tibial plateau 16/86/8779    COPD (chronic obstructive pulmonary disease) ([address]     CO2 retainer, on Bipap at night for this, Dr. Landy Wilkerson ( last visit 11/20/2020 and note on chart )    Depression     bipolar, major depressive disorder, ptsd, anxiety    Dysphagia     GI bleed 10/2020    Hypertension     Memory loss     Oxygen dependent     pt stated not needed as of 12/9/2020    Pain, joint, ankle and foot     Pancreatic lesion 10/2020    Dr. Delphine Taylor working up pt    Peripheral neuropathy     Seizures ([facility] [address] [facility]     also baseline tremors-last sz summer 2020    Tension headache     Under care of team 07/01/2021    neuro-Dr Wan-[facility]-last visit june 2021    Under care of team 07/01/2021    pain management-Hamzah jimenez-last visit june 2021    Under care of team 07/01/2021    pulmonology-Dr Dueñas-[facility]-last virtual visit feb 2021    Under care of team 07/01/2021    psych-bahnfeldt NP-telemed-last visit may 2021    Under care of team 07/01/2021    [address]-last visit june 2021    Wellness examination 07/01/2021    pcp-Ludivina grimes-last visit may 2021       Past Surgical History:   Procedure Laterality Date    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    COLONOSCOPY N/A 10/22/2020    COLONOSCOPY DIAGNOSTIC performed by Radha Dickerson MD at  [facility] [address] ENDOSCOPIC ULTRASOUND (LOWER) N/A 12/9/2020    ENDOSCOPIC ULTRASOUND, UPPER WITH LINEAR SCOPE FOR BIOPSY OF MASS ON HEAD OF PANCREAS performed by Maria Shi MD at [address] Left 7-3-13    ORIF tibial plateau    FRACTURE SURGERY Right     small finger metacarpal fracture    HAND SURGERY      pins    HYSTERECTOMY  2003    UPPER GASTROINTESTINAL ENDOSCOPY N/A 10/22/2020    EGD BIOPSY performed by Ulysses Dumont MD at [address] [facility] 4/5/2021    EGD BIOPSY performed by Ulysses Dumont MD at [facility]       Social History: Shabnam Bryant  reports that she has been smoking cigarettes. She has a 15.00 pack-year smoking history. She has never used smokeless tobacco. She reports previous alcohol use. She reports current drug use. Frequency: 2.00 times per week. Drug: Marijuana. Family History   Problem Relation Age of Onset    Other Father     Cancer Maternal Grandmother     Heart Disease Paternal Grandmother     Esophageal Cancer Maternal Aunt        Objective:   /85   Pulse 89   Temp 97.9 °F (36.6 °C) (Axillary)   Resp 15   Ht 5' 5\" (1.651 m)   Wt 143 lb 4.8 oz (65 kg)   SpO2 98%   BMI 23.85 kg/m²     Blood pressure range: Systolic (03WHF), PGW:905 , Min:105 , MUC:639   ; Diastolic (14ZEY), HOLLEY:95, Min:81, Max:122      Review of Systems:  Cannot complete due to patient condition, currently in pain, O2 sats dropping requiring Bipap. NEUROLOGIC EXAMINATION  GENERAL  Appears uncomfortable and in mild distress. Moaning in pain.    HEENT  NC/ AT   NECK  Supple   MENTAL STATUS:  Alert, oriented, there is no obvious confusion but patient does not answer all questions appropriately, she is easily irritable and agitated with questioning, normal speech, normal language, no obvious hallucination or delusion   CRANIAL NERVES: II     -      Visual fields intact to confrontation  III,IV,VI -  EOMs full, no afferent defect, no CAMMIE, no ptosis  V     -     Normal facial sensation  VII    -     Normal facial symmetry  VIII   -     Intact hearing  IX,X -     Symmetrical palate  XI    -     Symmetrical shoulder shrug  XII   -     Midline tongue, no atrophy    MOTOR FUNCTION:  Lifts all limbs easily off of bed, limited formal assessment due to respiratory distress and patient anxiety with exam with normal bulk, normal tone and no involuntary movements, no tremor   SENSORY FUNCTION:  Normal touch, normal pin   CEREBELLAR FUNCTION:  No tremor   REFLEX FUNCTION:  Symmetric, no perverted reflex, no Babinski sign   STATION and GAIT  Not tested       Data:    Lab Results:   CBC:   Recent Labs     07/24/21  2244 07/25/21  0701 07/26/21  0623   WBC 18.5* 17.7* 19.3*   HGB 12.0 12.2 11.5*    374 391     BMP:    Recent Labs     07/24/21  0549 07/25/21  0701 07/26/21  0623   * 141 139   K 4.2 3.8 3.5*    98 96*   CO2 25 28 27   BUN 15 18 18   CREATININE 0.69 0.75 0.80   GLUCOSE 115* 117* 114*         Lab Results   Component Value Date    LDLCHOLESTEROL 134 (H) 12/23/2020    HDL 42 12/23/2020    ALT 11 07/24/2021    AST 32 (H) 07/24/2021    TSH 0.68 12/23/2020    INR 0.9 07/22/2021    LABA1C 5.5 04/13/2021    [id number] 02/03/2021           Diagnostic data reviewed:  CT HEAD (7/25/21) -  No acute intracranial abnormality.       Suboccipital craniectomy. MRI LUMBAR (7/23/21) -  1. Acute compression deformity of the superior endplate of L5 with   approximately 35% loss of height.  No significant bulging of the posterior   cortex. 2. No significant spinal canal stenosis of the lumbar spine. 3. Neural foraminal narrowing at L3-L4 through L5-S1.   4. Minimal retrolisthesis at L5-S1.        EEG - pending            Impression:  -History of seizure disorder  -Metabolic encephalopathy in the setting of colitis and sepsis  -L5 vertebral body fracture  -History of migraine headaches    Plan:  -Continue Topamax 50mg BID and increased dose Tegretol 200mg TID  -EEG is pending  -PT/OT  -Follow up in the neurology clinic as scheduled  -We will follow    Please note that this note was generated using a voice recognition dictation software. Although every effort was made to ensure the accuracy of this automated transcription, some errors in transcription may have occurred.

## 2021-07-26 NOTE — CONSULTS
Patient - Antoinette Godinez   MRN -  [id number]   [facility] # - [de-identified]   - 1970        Date of evaluation -  2021    REASON FOR THE CONSULTATION:  hypoxia   HISTORY OF PRESENT ILLNESS:    Antoinette Godinez is a 45y.o. year old female here for evaluation of admitted to hospital in 21 due to back pain nd head ache . She was found to have thoracic spine compression fx . She has left sided abdominal pain  Without nausea or vomiting but cramping . Her xray chest has been worsening and she has poor cough due to pain in her lower rib cage on left side with cough and deep breathing . Has been having progressive hypoxia now on high flow oxygen .     She is active smoker 1 ppd and has wheezing , copd     Immunization   Immunization History   Administered Date(s) Administered    COVID-19, Pfizer, PF, 30mcg/0.3mL 2021, 2021    Influenza Vaccine, unspecified formulation 10/18/2018    Influenza Virus Vaccine 2017, 10/18/2018, 09/10/2019, 10/01/2020    Influenza, Quadv, IM, (6 mo and older Fluzone, Flulaval, Fluarix and 3 yrs and older Afluria) 09/10/2019    MMR 2006    Pneumococcal Polysaccharide ([id number]) 2020    Tdap (Boostrix, Adacel) 2020        Pneumococcal Vaccine     [] Up to date    [] Indicated   [] Refused  [] Contraindicated       Influenza Vaccine   [] Up to date    [] Indicated   [] Refused  [] Contraindicated            Pulmonary Rehab     [] completed    [] Indicated   [] Refused  [] Contraindicated       PAST MEDICAL HISTORY:       Diagnosis Date    Acute respiratory failure with hypoxia ([address] 10/16/2020    Alcohol withdrawal syndrome, with delirium ([address] 2019    Alcoholism ([address]     Anemia 10/2020    GI bleed    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 Martha Ye ( last visit 11/20/2020 and note on chart )    Depression     bipolar, major depressive disorder, ptsd, anxiety    Dysphagia     GI bleed 10/2020    Hypertension     Memory loss     Oxygen dependent     pt stated not needed as of 12/9/2020    Pain, joint, ankle and foot     Pancreatic lesion 10/2020    Dr. Manuella Dubin working up pt    Peripheral neuropathy     Seizures ([facility]     also baseline tremors-last sz summer 2020    Tension headache     Under care of team 07/01/2021    neuro-Dr [facility]-last visit june 2021    Under care of team 07/01/2021    pain management-Hamzah Martines-nery jimenez-last visit june 2021    Under care of team 07/01/2021    pulmonology-Dr Due[facility]as[facility]-last virtual visit feb 2021    Under care of team 07/01/2021    psych-bahnfeldt NP-telemed-last visit may 2021    Under care of team 07/01/2021    [address]-last visit june 2021    Wellness examination 07/01/2021    pcp-[address]-last visit may 2021         Family History:       Problem Relation Age of Onset    Other Father     Cancer Maternal Grandmother     Heart Disease Paternal Grandmother     Esophageal Cancer Maternal Aunt        SURGICAL HISTORY:   Past Surgical History:   Procedure Laterality Date    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    COLONOSCOPY N/A 10/22/2020    COLONOSCOPY DIAGNOSTIC performed by Pauline Huynh MD at [address] Sha[address] (LOWER) N/A 12/9/2020    ENDOSCOPIC ULTRASOUND, UPPER WITH LINEAR SCOPE FOR BIOPSY OF MASS ON HEAD OF PANCREAS performed by James Jauregui MD at [address] Left 7-3-13    ORIF tibial plateau    FRACTURE SURGERY Right     small finger metacarpal fracture    HAND SURGERY      pins    HYSTERECTOMY  2003    UPPER GASTROINTESTINAL ENDOSCOPY N/A 10/22/2020    EGD BIOPSY performed by Pauline Huynh MD at [facility]    UPPER GASTROINTESTINAL ENDOSCOPY N/A 4/5/2021    EGD BIOPSY performed by Radha Dickerson MD at [address]:    Social History     Socioeconomic History    Marital status: Single     Spouse name: None    Number of children: None    Years of education: None    Highest education level: None   Occupational History    None   Tobacco Use    Smoking status: Current Every Day Smoker     Packs/day: 0.50     Years: 30.00     Pack years: 15.00     Types: Cigarettes    Smokeless tobacco: Never Used    Tobacco comment: plans on quitting in the future    Vaping Use    Vaping Use: Never used   Substance and Sexual Activity    Alcohol use: Not Currently     Alcohol/week: 0.0 standard drinks    Drug use: Yes     Frequency: 2.0 times per week     Types: Marijuana    Sexual activity: None   Other Topics Concern    None   Social History Narrative    None     Social Determinants of Health     Financial Resource Strain: Medium Risk    Difficulty of Paying Living Expenses: Somewhat hard   Food Insecurity: No Food Insecurity    Worried About Running Out of Food in the Last Year: Never true    [place] of Food in the Last Year: Never true   Transportation Needs:     Lack of Transportation (Medical):  Lack of Transportation (Non-Medical):    Physical Activity:     Days of Exercise per Week:     Minutes of Exercise per Session:    Stress:     Feeling of Stress :    Social Connections:     Frequency of Communication with Friends and Family:     Frequency of Social Gatherings with Friends and Family:     Attends Jehovah's witness Services:     Active Member of Clubs or Organizations:     Attends Club or Organization Meetings:     Marital Status:    Intimate Partner Violence:     Fear of Current or Ex-Partner:     Emotionally Abused:     Physically Abused:     Sexually Abused:         TOBACCO:   reports that she has been smoking cigarettes.  She has a 15.00 pack-year smoking history. She has never used smokeless tobacco.  ETOH:   reports previous alcohol use. ALLERGIES:  No Known Allergies    Home Meds:   Prior to Admission medications    Medication Sig Start Date End Date Taking? Authorizing Provider   acamprosate (CAMPRAL) 333 MG tablet Take 2 tablets by mouth 3 times daily 7/16/21 8/15/21  LOI Mccormack NP   sertraline (ZOLOFT) 50 MG tablet Take 3 tablets by mouth daily for 7 days, THEN 2 tablets daily for 7 days, THEN 1 tablet daily for 7 days. 7/16/21 8/6/21  LOI Mccormack NP   FLUoxetine (PROZAC) 20 MG tablet Take 0.5 tablets by mouth daily for 7 days, THEN 1 tablet daily for 23 days. 7/16/21 8/15/21  LOI Mccormack NP   traZODone (DESYREL) 50 MG tablet TAKE 1 AND 1/2 TABLET BY MOUTH ONCE NIGHTLY AS NEEDED FOR SLEEP 7/16/21   LOI Mccormack NP   thermotabs (MEDI-LYTE) TABS tablet Take 1 tablet by mouth daily for 3 days 7/9/21 7/12/21  LOI Logan NP   carBAMazepine (TEGRETOL XR) 200 MG extended release tablet Take 1 tablet by mouth 2 times daily 7/6/21   Gio Lazcano MD   topiramate (TOPAMAX) 25 MG tablet Take 2 tablets by mouth daily 7/6/21   Gio Lazcano MD   pregabalin (LYRICA) 200 MG capsule Take 1 capsule by mouth 3 times daily for 90 days.  7/2/21 9/30/21  Gio Lazcano MD   KLOR-CON M20 20 MEQ extended release tablet TAKE ONE TABLET BY MOUTH DAILY 6/24/21   Ludivina Leon MD   amLODIPine (NORVASC) 5 MG tablet TAKE ONE TABLET BY MOUTH DAILY 6/3/21   Ludivina Leon MD   busPIRone (BUSPAR) 15 MG tablet Take 1 tablet by mouth 3 times daily 5/5/21   Ludivina Leon MD   baclofen (LIORESAL) 10 MG tablet Take 1 tablet by mouth 3 times daily 5/25/21   Ludivina Leon MD   ibuprofen (ADVIL;MOTRIN) 600 MG tablet Take 1 tablet by mouth 3 times daily as needed for Pain 5/25/21   Ludivina Leon MD   sertraline (ZOLOFT) 100 MG tablet Take 2 tablets by mouth daily 5/17/21 Nery Cobb, LOI - NP   ferrous sulfate (IRON 325) 325 (65 Fe) MG tablet Take 1 tablet by mouth 2 times daily 4/22/21   Ludivina Alba MD   rOPINIRole (REQUIP) 1 MG tablet Take 1 tablet by mouth nightly 4/1/21   Ludivina Alba MD   budesonide-formoterol [facility]) 160-4.5 MCG/ACT AERO Inhale 2 puffs into the lungs 2 times daily 3/31/21   Ludivina Alba MD   hydrOXYzine (ATARAX) 50 MG tablet TAKE ONE TABLET BY MOUTH THREE TIMES A DAY 3/12/21   Ludivina Alba MD   tiotropium (SPIRIVA RESPIMAT) 2.5 MCG/ACT AERS inhaler Inhale 2 puffs into the lungs daily 2/26/21 7/8/21  Abdon Ayala MD   sodium chloride (ALTAMIST SPRAY) 0.65 % nasal spray 1 spray by Nasal route as needed for Congestion 12/28/20   Ludivina Alba MD   ACETAMINOPHEN EXTRA STRENGTH 500 MG tablet Take 500 mg by mouth 3 times daily as needed 5/8/20   Historical Provider, MD   albuterol sulfate HFA (VENTOLIN HFA) 108 (90 Base) MCG/ACT inhaler Inhale 2 puffs into the lungs 4 times daily as needed for Wheezing 6/11/20   Ludivina Alba MD   vitamin B-1 (THIAMINE) 100 MG tablet Take 1 tablet by mouth daily 7/12/19   Ludivina Alba MD   vitamin D (ERGOCALCIFEROL) 14478 units CAPS capsule Take 1 capsule by mouth once a week 6/19/19   uLdivina Alba MD   folic acid (FOLVITE) 1 MG tablet Take 1 tablet by mouth daily 6/19/19   Ludivina Alba MD     CURRENT MEDS :  Scheduled Meds:   spironolactone  50 mg Oral Daily    ipratropium-albuterol  1 ampule Inhalation 4x daily    polyethylene glycol  17 g Oral BID    lactulose  20 g Oral TID    carBAMazepine  200 mg Oral TID    methylPREDNISolone  40 mg Intravenous Q12H    topiramate  50 mg Oral BID    amLODIPine  5 mg Oral Daily    baclofen  10 mg Oral TID    budesonide-formoterol  2 puff Inhalation BID    busPIRone  15 mg Oral TID    ferrous sulfate  325 mg Oral BID    folic acid  1 mg Oral Daily    potassium chloride  20 mEq Oral Daily with breakfast    pregabalin  200 mg Oral TID    rOPINIRole  1 mg Oral Nightly    sodium chloride flush  5-40 mL Intravenous 2 times per day    enoxaparin  40 mg Subcutaneous Daily    piperacillin-tazobactam  3,375 mg Intravenous Q8H       Continuous Infusions:   sodium chloride 25 mL (07/24/21 6013)           REVIEW OF SYSTEMS:  Review of Systems   Constitutional: Positive for fatigue. Negative for fever. HENT: Negative. Respiratory: Positive for cough, shortness of breath and wheezing. Cardiovascular: Negative. Gastrointestinal: Positive for abdominal pain. Negative for diarrhea, nausea and vomiting. Endocrine: Negative. Genitourinary: Negative for dysuria. Musculoskeletal: Positive for back pain. Skin: Positive for rash. Neurological: Positive for seizures and weakness. Hematological: Negative. Psychiatric/Behavioral: Positive for confusion.          Vitals:  BP (!) 127/92   Pulse 93   Temp 97.5 °F (36.4 °C) (Axillary)   Resp 19   Ht 5' 5\" (1.651 m)   Wt 143 lb 4.8 oz (65 kg)   SpO2 97%   BMI 23.85 kg/m²     PHYSICAL EXAM:  General Appearance:    Alert confused , no wob , older than stated age    Head:    Normocephalic, without obvious abnormality, atraumatic      Eyes:    PERRL, conjunctiva/corneas clear, EOM's intact   Ears:    Normal TM's and external ear canals, both ears   Nose:   Nares normal, septum midline, mucosa normal, no drainage        or sinus tenderness   Throat:   Lips, mucosa, and tongue normal; teeth and gums normal   Neck:   Supple, symmetrical, trachea midline, no adenopathy;     thyroid:  no enlargement/tenderness/nodules;    Back:     Tender spine      Lungs:       B/l post BB , exp rhonchi b/l    Chest Wall:    No tenderness or deformity      Heart:    Regular rate and rhythm, S1 and S2 normal, no murmur, rub        or gallop no rvh                           Abdomen:                                                 Pulses:                              Skin:                  Lymph nodes:                    Neurologic:                  Soft, non-tender, bowel sounds active all four quadrants,     no masses, no organomegaly         2+ and symmetric all extremities     Skin color, texture, turgor normal, no rashes or lesions       Cervical, supraclavicular not enlarged or matted or tender      CNII-XII intact,      Clubbing No  Lower ext edema No1+   [] , 2 +  [] , 3+   []  Upper ext edema No             CBC:   Recent Labs     07/24/21  2244 07/25/21  0701 07/26/21  0623   WBC 18.5* 17.7* 19.3*   HGB 12.0 12.2 11.5*    374 391     BMP:    Recent Labs     07/24/21  0549 07/25/21  0701 07/26/21  0623   * 141 139   K 4.2 3.8 3.5*    98 96*   CO2 25 28 27   BUN 15 18 18   CREATININE 0.69 0.75 0.80   GLUCOSE 115* 117* 114*     Hepatic:   Recent Labs     07/24/21  0549   AST 32*   ALT 11   BILITOT 0.34   ALKPHOS 260*     Amylase:   Lab Results   Component Value Date    AMYLASE 78 07/22/2021     Lipase:   Lab Results   Component Value Date    LIPASE 67 07/22/2021     Troponin: No results for input(s): TROPONINI in the last 72 hours. BNP: No results for input(s): BNP in the last 72 hours. Lipids: No results for input(s): CHOL, HDL in the last 72 hours. Invalid input(s): LDLCALCU  ABGs: No results found for: PHART, PO2ART, [id number]  INR: No results for input(s): INR in the last 72 hours. Thyroid:   Lab Results   Component Value Date    TSH 0.68 12/23/2020     Urinalysis: No results for input(s): BACTERIA, BLOODU, CLARITYU, COLORU, PHUR, PROTEINU, RBCUA, SPECGRAV, BILIRUBINUR, NITRU, WBCUA, LEUKOCYTESUR, GLUCOSEU in the last 72 hours. XR THORACIC SPINE (3 VIEWS)    Result Date: 7/23/2021  Lumbar spine: Superior endplate fracture L5 which appears acute to subacute. No subluxation. Other vertebra well maintained. Spine significant compression deformity T6 with there are endplate loss in height T8, T9 and T10. No subluxation. Mild levo scoliotic curvature.  Osteopenia complicates assessment. Pedicles are intact. Low lung volumes complicate assessment. There is suggestion of pleural effusions. XR LUMBAR SPINE (2-3 VIEWS)    Result Date: 7/23/2021  Lumbar spine: Superior endplate fracture L5 which appears acute to subacute. No subluxation. Other vertebra well maintained. Spine significant compression deformity T6 with there are endplate loss in height T8, T9 and T10. No subluxation. Mild levo scoliotic curvature. Osteopenia complicates assessment. Pedicles are intact. Low lung volumes complicate assessment. There is suggestion of pleural effusions. XR ABDOMEN (KUB) (SINGLE AP VIEW)    Result Date: 7/22/2021  Mild dilation of the right hemicolon and patchy small bowel gas most likely due to ileus. XR ACUTE ABD SERIES CHEST 1 VW    Result Date: 7/24/2021  Bilateral airspace disease and pleural effusions. Findings in the upper lobes appear increased compared to prior, left greater than right. Findings may be related to asymmetric edema with superimposed pneumonia not excluded. Gas and stool are seen throughout nondilated bowel loops with few nonspecific air-fluid levels in the right lower abdomen, likely in small bowel. Findings may be physiologic or related to mild ileus. No evidence of obstruction or free air. CT HEAD WO CONTRAST    Result Date: 7/25/2021  No acute intracranial abnormality. Suboccipital craniectomy. MRI THORACIC SPINE WO CONTRAST    Result Date: 7/23/2021  1. Limited examination. 2. Compression deformities involving T6, T8, T9 and T11. These are likely chronic in nature. Diffusely decreased T1 and T2 marrow signal within the T6 vertebral body compatible with the sclerosis seen on the prior CT. 3. There is minimal bone marrow edema involving the left lateral elements of T6 and T7. Unclear whether this is degenerative in nature or perhaps sequelae of recent or remote trauma. 4. No significant spinal canal stenosis of the thoracic spine.  5. Moderate bilateral neural foraminal narrowing at T6-T7. 6. Bilateral pleural effusions, left greater than right. MRI LUMBAR SPINE WO CONTRAST    Result Date: 7/23/2021  1. Acute compression deformity of the superior endplate of L5 with approximately 35% loss of height. No significant bulging of the posterior cortex. 2. No significant spinal canal stenosis of the lumbar spine. 3. Neural foraminal narrowing at L3-L4 through L5-S1. 4. Minimal retrolisthesis at L5-S1. XR CHEST PORTABLE    Result Date: 7/25/2021  1. Increasing vascular congestion and left perihilar opacity. 2.  Pleural effusions and basilar opacities are otherwise without significant change. XR CHEST PORTABLE    Result Date: 7/23/2021  Interval development of bibasilar infiltrates and small pleural effusions which could represent dependent edema, pneumonia or aspiration. XR CHEST PORTABLE    Result Date: 7/22/2021  No radiologic evidence of acute cardiopulmonary disease. CT CHEST ABDOMEN PELVIS W CONTRAST    Result Date: 7/21/2021  Probable acute diverticulitis or colitis left colon. No evidence of perforation or abscess at this time. Chronic pancreatitis. Multiple compression fractures some of which are new since the previous exam. Lingular ground-glass infiltrate. Recommend follow-up to resolution. Intra-atrial lipoma right atrium. Cardiac echo may be helpful. CT LUMBAR SPINE TRAUMA RECONSTRUCTION    Result Date: 7/21/2021  CT thoracic spine: 1. Decrease in height in T6 compared to prior study compatible with worsening compression with subacute etiology and mild protrusion of the dorsal aspect of T6. Narrowed T6-T7 neural foramina. 2.  Chronic superior height T8, T9, and T11 without interval change from prior study. 3.  Mild prevertebral soft tissue prominence T6 without evidence of abscess formation. CT lumbar spine: 1. No traumatic malalignment. 2.  Compression fracture superior L5 with subacute appearance.  3. Calcifications in the abdomen incompletely included appearance suggesting pancreatic calcifications. RECOMMENDATIONS: Please reference CT chest, abdomen and pelvis of same day. CT THORACIC SPINE TRAUMA RECONSTRUCTION    Result Date: 7/21/2021  CT thoracic spine: 1. Decrease in height in T6 compared to prior study compatible with worsening compression with subacute etiology and mild protrusion of the dorsal aspect of T6. Narrowed T6-T7 neural foramina. 2.  Chronic superior height T8, T9, and T11 without interval change from prior study. 3.  Mild prevertebral soft tissue prominence T6 without evidence of abscess formation. CT lumbar spine: 1. No traumatic malalignment. 2.  Compression fracture superior L5 with subacute appearance. 3.  Calcifications in the abdomen incompletely included appearance suggesting pancreatic calcifications. RECOMMENDATIONS: Please reference CT chest, abdomen and pelvis of same day. IMPRESSION:    Principal Problem:    Sepsis ([address]  Active Problems:    Essential hypertension    Tobacco use    Seizure disorder (HCC)    COPD with acute exacerbation (HCC)    Acute respiratory failure with hypoxia (HCC)    Recurrent major depressive disorder ([address]    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24, the patient underwent 2 surgical resections without known recurrence    Metabolic encephalopathy    AMAN (generalized anxiety disorder)    Chronic peripheral neuropathic pain    History of alcohol abuse    Personal history of astrocytoma    Marijuana abuse    Closed fracture of fifth thoracic vertebra (HCC)    Diverticulitis of large intestine without perforation or abscess without bleeding    Elevated brain natriuretic peptide (BNP) level    Elevated alkaline phosphatase level    Chronic pancreatitis (HCC)    Erythema ab igne    Compression fracture of thoracic vertebra (HCC)    Compression of lumbar vertebra (HCC)  Resolved Problems:    * No resolved hospital problems.  *          : PLAN:        Xray chest shows progressive b/l lower lobe atelectasis / pneumonia with progressive worsening of shunt and leading to hypoxia requiring high flow oxygen . ABG does not show co2 retention . She has poor cough and secretion clearance due to pain from thoracic spine Fx . D/w rt and Rn will start meta neb and IS to help with secretion clearance . If starts with respiratory muscle fatigue or worsening Pa Co2 then will need transfer to ICU and intubation   Continue iv steroids and zosyn . bronchodilator       I hope this updates you on my evaluation and clinical thinking. Thank you for allowing me to participate in his care.      Sincerely,      Electronically signed by Tonie Cruz MD on 7/26/2021 at 7:53 PM

## 2021-07-26 NOTE — PROGRESS NOTES
[facility]  Office: [address], DO, Charlyas Lincoln, DO, Kent Mohs, DO, Jaimesarah Huang, DO, Stacy Mejia MD, Jermaine Sheehan MD, Josh Bernal MD, Juany Oreilly MD, Tamra Valdez MD, Brenna Milner MD, Jessa Kern MD, Sapna Fishman, DO, Roman Veronica MD, Bryan Zamora DO, Bartolome Cary MD,  Franklyn Sapp DO, Jack Nichols MD, Hafsa Grullon MD, Maggie Lopez MD, Christiano Guzmán MD, Samreen Herrera MD, Ziggy West MD, Jory Santo, [facility], [facility], CNP, Hi Mckenzie CNP, Julio Cesar Jameson, CNS, Delia Malloy, [facility], Jammie Haji, CNP, Kenneth Guerra, CNP, Aurelio Montes, CNP, Dilshad Ferrari CNP, Kleber Roland PA-C, Gregor Gannon [facility], Rachel Borden, CNP, Eladio Goldberg, CNP, Lizett Becker, CNP, Gloria Bush, CNP, Brandon Cheek CNP, Rita Angel, CNP, Jameel Castaneda, [facility], Humberto Escamilla, [address] [place] [address]    Progress Note    7/26/2021    7:59 AM    Name:   Vivian Jane  MRN:     [id number]     Acct:      [de-identified]   Room:   2022/2022-01   Day:  5  Admit Date:  7/21/2021  3:06 PM    PCP:   Margarita Abreu MD  Code Status:  Full Code    Subjective:     C/C:   Chief Complaint   Patient presents with    Back Pain     states chronic back pain becoming worse.  has recently started physical therapy   Abdominal pain     Interval History Status: Better  Patient seen and examined at bedside, been on BIPAP overnight, breathing better but she is very anxious and worked up today  Abdominal pain is better, had huge BMs yesterday  Intermittent back pain. Abdominal pain is better. patient denies any chest pain, chills, fevers, nausea or vomiting.   Patient vitals, labs and all providers notes were reviewed,from overnight shift and morning updates were noted and discussed with the nurse    Brief History:     Per chart  This is a 43-year-old female with underlying history of hypertension, COPD, anxiety/depression, UTI, thoracic compression fractures, peripheral neuropathy, migraine headaches, seizure disorder, and alcoholism (last drink 6 months ago) who presents the emergency department with generalized malaise, headache and abdominal/back pain. Initial blood work revealed leukocytosis, elevated alkaline phosphatase, she was tachycardic and hypoxic as well, imaging revealed picture of acute diverticulitis/colitis of left colon without peripheral abscess along with chronic pancreatitis, CT spine revealed stable T7-T9 and T11 compression fracture, worsening of compression fracture of T6 and new T5 and superior L5 subacute fracture. Patient was admitted for further management. Also mentioned rash on her back, from location and morphology seems to be related to heating beds    Review of Systems:     Review of Systems   Constitutional: Positive for activity change, appetite change and fatigue. Negative for chills, diaphoresis and fever. HENT: Negative for congestion. Eyes: Negative for visual disturbance. Respiratory: Positive for shortness of breath. Negative for chest tightness and wheezing. Cardiovascular: Negative for chest pain, palpitations and leg swelling. Gastrointestinal: Negative for blood in stool, constipation, diarrhea, nausea and vomiting. Genitourinary: Negative for difficulty urinating. Musculoskeletal: Positive for back pain. Skin: Positive for rash. Neurological: Positive for weakness. Negative for dizziness, light-headedness, numbness and headaches. Psychiatric/Behavioral: The patient is nervous/anxious. All other systems reviewed and are negative. Medications:      Allergies:  No Known Allergies    Current Meds:   Scheduled Meds:    polyethylene glycol  17 g Oral BID    lactulose  20 g Oral TID    carBAMazepine  200 mg Oral TID    methylPREDNISolone  40 mg Intravenous Q12H    topiramate  50 mg Oral BID    tiotropium  2 puff Inhalation Daily    levalbuterol  1.25 mg Nebulization Once    amLODIPine  5 mg Oral Daily    baclofen  10 mg Oral TID    budesonide-formoterol  2 puff Inhalation BID    busPIRone  15 mg Oral TID    ferrous sulfate  325 mg Oral BID    folic acid  1 mg Oral Daily    potassium chloride  20 mEq Oral Daily with breakfast    pregabalin  200 mg Oral TID    rOPINIRole  1 mg Oral Nightly    sodium chloride flush  5-40 mL Intravenous 2 times per day    enoxaparin  40 mg Subcutaneous Daily    piperacillin-tazobactam  3,375 mg Intravenous Q8H     Continuous Infusions:    sodium chloride 25 mL (07/24/21 1803)     PRN Meds: LORazepam, hyoscyamine, hydrOXYzine, [Held by provider] fentanNYL, bisacodyl, albuterol, traZODone, sodium chloride flush, sodium chloride, acetaminophen **OR** acetaminophen, [Held by provider] oxyCODONE-acetaminophen **OR** [DISCONTINUED] oxyCODONE-acetaminophen, melatonin    Data:     Past Medical History:   has a past medical history of Acute respiratory failure with hypoxia ([address] Alcohol withdrawal syndrome, with delirium ([address] Alcoholism ([address] Anemia, [address]rocyt[address] [address] - 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 Social History:   reports that she has been smoking cigarettes. She has a 15.00 pack-year smoking history. She has never used smokeless tobacco. She reports previous alcohol use. She reports current drug use. Frequency: 2.00 times per week. Drug: Marijuana.      Family History:   Family History   Problem Relation Age of Onset    Other Father     Cancer Maternal Grandmother     Heart Disease Paternal Grandmother     Esophageal Cancer Maternal Aunt        Vitals:  /85   Pulse 89   Temp 97.9 °F (36.6 °C) (Axillary)   Resp 15   Ht 5' 5\" (1.651 m)   Wt 143 lb 4.8 oz (65 kg)   SpO2 98%   BMI 23.85 kg/m²   Temp (24hrs), Av.9 °F (37.2 °C), Min:97.9 °F (36.6 °C), Max:99.7 °F (37.6 °C)    No results for input(s): POCGLU in the last 72 hours. I/O (24Hr):     Intake/Output Summary (Last 24 hours) at 2021 0759  Last data filed at 2021 1530  Gross per 24 hour   Intake --   Output 350 ml   Net -350 ml       Labs:  Hematology:  Recent Labs     21  2244 21  0701 21  0623   WBC 18.5* 17.7* 19.3*   RBC 4.02 4.12 3.90*   HGB 12.0 12.2 11.5*   HCT 36.5 37.6 35.5*   MCV 90.8 91.3 91.0   MCH 29.9 29.6 29.5   MCHC 32.9 32.4 32.4   RDW 13.5 13.5 13.5    374 391   MPV 9.5 10.0 10.0     Chemistry:  Recent Labs     21  0549 21  0701 21  0623   * 141 139   K 4.2 3.8 3.5*    98 96*   CO2 25 28 27   GLUCOSE 115* 117* 114*   BUN 15 18 18   CREATININE 0.69 0.75 0.80   ANIONGAP 7* 15 16   LABGLOM >60 >60 >60   GFRAA >60 >60 >60   CALCIUM 8.7 9.1 8.4*     Recent Labs     21  0549 21  1122   PROT 5.8*  --    LABALBU 2.7*  --    AST 32*  --    ALT 11  --    ALKPHOS 260*  --    BILITOT 0.34  --    BILIDIR 0.22  --    AMMONIA  --  23     ABG:  Lab Results   Component Value Date    POCPH 7.452 10/16/2020    POCPCO2 37.0 10/16/2020    POCPO2 80.7 10/16/2020    POCHCO3 25.9 10/16/2020    NBEA NOT REPORTED 10/16/2020    PBEA 2 10/16/2020    HLX0ARB 27 10/16/2020    JNLJ1QZN 96 10/16/2020    FIO2 INFORMATION NOT PROVIDED 2021     Lab Results   Component Value Date/Time    SPECIAL  R FA 18ML 2021 10:35 PM     Lab Results   Component Value Date/Time    CULTURE NO GROWTH 2 DAYS 2021 10:35 PM       Radiology:  XR ABDOMEN (KUB) (SINGLE AP VIEW)    Result Date: 2021  Mild dilation of the right hemicolon and patchy small bowel gas most likely due to ileus. XR CHEST PORTABLE    Result Date: 7/22/2021  No radiologic evidence of acute cardiopulmonary disease. CT CHEST ABDOMEN PELVIS W CONTRAST    Result Date: 7/21/2021  Probable acute diverticulitis or colitis left colon. No evidence of perforation or abscess at this time. Chronic pancreatitis. Multiple compression fractures some of which are new since the previous exam. Lingular ground-glass infiltrate. Recommend follow-up to resolution. Intra-atrial lipoma right atrium. Cardiac echo may be helpful. CT LUMBAR SPINE TRAUMA RECONSTRUCTION    Result Date: 7/21/2021  CT thoracic spine: 1. Decrease in height in T6 compared to prior study compatible with worsening compression with subacute etiology and mild protrusion of the dorsal aspect of T6. Narrowed T6-T7 neural foramina. 2.  Chronic superior height T8, T9, and T11 without interval change from prior study. 3.  Mild prevertebral soft tissue prominence T6 without evidence of abscess formation. CT lumbar spine: 1. No traumatic malalignment. 2.  Compression fracture superior L5 with subacute appearance. 3.  Calcifications in the abdomen incompletely included appearance suggesting pancreatic calcifications. RECOMMENDATIONS: Please reference CT chest, abdomen and pelvis of same day. CT THORACIC SPINE TRAUMA RECONSTRUCTION    Result Date: 7/21/2021  CT thoracic spine: 1. Decrease in height in T6 compared to prior study compatible with worsening compression with subacute etiology and mild protrusion of the dorsal aspect of T6. Narrowed T6-T7 neural foramina. 2.  Chronic superior height T8, T9, and T11 without interval change from prior study. 3.  Mild prevertebral soft tissue prominence T6 without evidence of abscess formation. CT lumbar spine: 1. No traumatic malalignment. 2.  Compression fracture superior L5 with subacute appearance.  3.  Calcifications in the abdomen incompletely included appearance suggesting pancreatic calcifications. RECOMMENDATIONS: Please reference CT chest, abdomen and pelvis of same day. Physical Examination:        Physical Exam  Vitals and nursing note reviewed. Constitutional:       Comments: On BIPAP   HENT:      Head: Normocephalic and atraumatic. Eyes:      Conjunctiva/sclera: Conjunctivae normal.      Pupils: Pupils are equal, round, and reactive to light. Cardiovascular:      Rate and Rhythm: Normal rate and regular rhythm. Heart sounds: No murmur heard. Pulmonary:      Effort: Pulmonary effort is normal. No accessory muscle usage or respiratory distress. Breath sounds: No stridor. Decreased breath sounds present. No wheezing, rhonchi or rales. Comments: On nasal canula  Abdominal:      General: Bowel sounds are decreased. There is no distension. Palpations: Abdomen is soft. Tenderness: There is generalized abdominal tenderness and tenderness in the right upper quadrant and epigastric area. There is no guarding. Musculoskeletal:         General: No tenderness. Skin:     General: Skin is warm and dry. Findings: Rash ( Diffuse rash on the back ) present. No erythema or lesion. Neurological:      Mental Status: She is alert and oriented to person, place, and time. Cranial Nerves: No cranial nerve deficit. Motor: No seizure activity. Psychiatric:         Speech: Speech normal.         Behavior: Behavior normal. Behavior is cooperative.          Assessment:        Hospital Problems         Last Modified POA    * (Principal) Sepsis ([address] 7/22/2021 Yes    Acute respiratory failure with hypoxia ([address] 7/22/2021 Yes    Diverticulitis of large intestine without perforation or abscess without bleeding 7/22/2021 Yes    Essential hypertension 7/21/2021 Yes    Tobacco use 7/21/2021 Yes    Seizure disorder ([address] 7/25/2021 Yes    COPD with acute exacerbation ([address] 7/22/2021 Yes    Recurrent major depressive disorder ([address] 7/21/2021 Yes    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24, the patient underwent 2 surgical resections without known recurrence 4/84/5185 Yes    Metabolic encephalopathy 3/51/1158 Yes    AMAN (generalized anxiety disorder) 7/21/2021 Yes    Chronic peripheral neuropathic pain 7/21/2021 Yes    History of alcohol abuse 7/21/2021 Yes    Personal history of astrocytoma 7/21/2021 Yes    Marijuana abuse 7/21/2021 Yes    Closed fracture of fifth thoracic vertebra ([address] 7/22/2021 Yes    Elevated brain natriuretic peptide (BNP) level 7/22/2021 Yes    Elevated alkaline phosphatase level 7/22/2021 Yes    Chronic pancreatitis ([address] 7/22/2021 Yes    Erythema ab igne 7/22/2021 Yes    Compression fracture of thoracic vertebra ([address] 7/23/2021 Yes    Compression of lumbar vertebra ([address] 7/23/2021 Yes          Plan:        Sepsis: improving,  Likely secondary to colitis/diverticulitis, discontinue vancomycin and keep Zosyn ( day# 6)  , cultures   With no growth     Colitis/diverticulitis/chronic pancreatitis/constipation/ ileus : bowel regimen per GI  Appreciate GI input     New fracture at T5 and L5,Chronic compression fracture in the thoracic vertebrae, there is worsening and compression fracture at T6 :  neurosurgery evaluation is in progress, no intervention planned for now   Patient denies any trauma or injury or falls  Patient has known osteoporosis    Acute hypoxemic respiratory failure: Likely secondary to her COPD along with pleural effusion and CHF, add pulmonary consult  Continue BiPAP and supplemental oxygen . COPD exacerbation: Continue breathing treatments, switch to IV steroids oral prednisone    Acute decompensated diastolic heart failure: evated BNP, echocardiogram came back with preserved EF. Lasix  IV , strict I's & O's, daily weight, cardiac diet and fluid restriction      Seizure disorder: Follows up with neurology as an outpatient, continue her home medications.     History of alcohol use: Per the patient report she is sober for 6 months, mother does report patient did have a drink twice in the last couple of month . Elevated alkaline phosphatase: elevated GGT , likely related to alcoholic liver disease  AFP and CEA 19-9 are negative    HTN: continue home medications assessment and plan the patient is so sick    HPL: continue home medications    Depression    Erythema Ab igne : On her back from prolonged use of heating Pads  .   DVT prophylaxis     Seizure like activity in a patient with H/O seizures : Continue Tegretol and Topamax, appreciate neurology  Input, CT head is negative and EEG is pending    Patient condition continues to be critical , continue to monitor very closely    Discussed in detail with the patient, her mother and the nurse      Kavin Mccarthy MD  7/26/2021  7:59 AM

## 2021-07-26 NOTE — PROGRESS NOTES
Patient extremely anxious upon writer's arrival this morning. She was pulling off her brief, pulling off her oxygen, and pulling off her socks stating she was freezing. When writer tried to put socks and blanket back on and move the fan, she got even more anxious. Patient's oxygen level was dropping and writer tried repeatedly to put it back on. Writer then put the bipap back on and it seemed to have calmed her for a minute. When physicians were in room asking questions she got extremely more anxious and was unable to re-direct. She requested a alli bear, so writer called mother and asked that she bring one when she visits. Anti-anxiety medication was given. Several minutes post medication, patient is resting quietly in bed.

## 2021-07-26 NOTE — PROGRESS NOTES
Occupational Therapy   Occupational Therapy Initial Assessment  Date: 2021   Patient Name: Kady Dutta  MRN: [id number]     : 1970     Chief Complaint   Patient presents with    Back Pain     states chronic back pain becoming worse.  has recently started physical therapy       Date of Service: 2021    Discharge Recommendations:  Patient would benefit from continued therapy after discharge  OT Equipment Recommendations  Equipment Needed: Yes  Mobility Devices: Heather Castellani; ADL Assistive Devices  Walker: Rolling  ADL Assistive Devices: Shower Chair with back   Equipment recommendations listed below are based on what the patient would need if they were able to return to prior living arrangements at the time of discharge. Assessment   Performance deficits / Impairments: Decreased functional mobility ; Decreased ADL status; Decreased strength;Decreased safe awareness;Decreased cognition;Decreased endurance;Decreased balance;Decreased high-level IADLs;Decreased coordination  Assessment: Pt agreeable to OT eval this date. Pt limited by pain, fatigue, and cognitive status this date. Pt completed supine>sit requiring Mod A for BLE and trunk progression. Pt demonstrates difficulty with maintaining attention and only able to follow simple 1-step commands this date. Pt completed functional transfers and short functional mobility requiring Min A and RW use; see below for further information. Pt educated on bracing abdomen with pillow for pain management throughout maneuvers, demonstrating good follow through with minimal verbal prompting. Pt requires Min-Mod A for UB and LB ADLs at this time. Pt will require continued OT services to address deficits in cognition, strength, endurance, coordination, and balance to increase independence and safety with ADLs/IADLs and functional transfers/mobility.  At this time, pt would not be safe to return to prior living arrangements d/t the need for skilled physical assistance. Prognosis: Fair  Decision Making: High Complexity  Patient Education: Pt ed on OT role, OT POC, safety awareness, bed mobility training, pain , transfer training, DME use, and importance of continued OT. Pt verbalized fair understanding however further education is recommended d/t cognitive status this date  REQUIRES OT FOLLOW UP: Yes  Activity Tolerance  Activity Tolerance: Patient Tolerated treatment well;Treatment limited secondary to decreased cognition  Safety Devices  Safety Devices in place: Yes  Type of devices: Nurse notified; Left in bed;Gait belt;Call light within reach; Bed alarm in place  Restraints  Initially in place: No           Patient Diagnosis(es): The primary encounter diagnosis was Septicemia ([address] Diagnoses of Compression fracture of thoracic vertebra, initial encounter, unspecified thoracic vertebral level (HCC) and Compression fracture of lumbar vertebra, initial encounter, unspecified lumbar vertebral level ([address] were also pertinent to this visit. 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 has a past surgical history that includes Hysterectomy (2003); Brain tumor excision (1989); fracture surgery (Left, 7-3-13); fracture surgery (Right); Upper gastrointestinal endoscopy (N/A, 10/22/2020); Colonoscopy (N/A, 10/22/2020);  Endoscopic ultrasonography, GI (N/A, 12/9/2020); Upper gastrointestinal endoscopy (N/A, 4/5/2021); and Hand surgery. Restrictions  Restrictions/Precautions  Restrictions/Precautions: Fall Risk, Up as Tolerated, Seizure  Required Braces or Orthoses?: No  Position Activity Restriction  Other position/activity restrictions: no activity restrictions per Art Franco with NS. Maintain SpO2 >94%, HiFlo O2. up with assist    Subjective   General  Patient assessed for rehabilitation services?: Yes  Family / Caregiver Present: Yes (mother present)  General Comment  Comments: RN ok'd pt for OT/PT eval this date. Pt agreeable to session and pleasant/cooperative throughout  Patient Currently in Pain: Yes  Pain Assessment  Pain Assessment: 0-10  Pain Level: 4  Pain Type: Acute pain;Chronic pain  Pain Location: Back; Abdomen  Pain Descriptors: Aching;Discomfort  Functional Pain Assessment: Prevents or interferes some active activities and ADLs  Non-Pharmaceutical Pain Intervention(s): Ambulation/Increased Activity; Distraction; Emotional support  Response to Pain Intervention: Patient Satisfied  Pre Treatment Pain Screening  Intervention List: Patient able to continue with treatment  O2 Device: Heated high flow cannula     Social/Functional History  Social/Functional History  Lives With: Alone  Type of Home: House  Home Layout: One level  Home Access: Stairs to enter without rails  Entrance Stairs - Number of Steps: 2  Entrance Stairs - Rails: None  Bathroom Shower/Tub: Tub/Shower unit, Doors  Bathroom Toilet: Standard  Bathroom Equipment:  (toilet's between tub and sink)  Bathroom Accessibility: Accessible  Home Equipment:  (no DME use at baseline)  ADL Assistance: [address]: Independent  Homemaking Responsibilities: Yes  Ambulation Assistance: Independent  Transfer Assistance: Independent  Active : No  Patient's  Info: mother  Mode of Transportation: Family, Cab  Occupation: Unemployed  IADL Comments: Pt started outpatient physical therapy , had first vist on 7/20/21 and next day was admitted to the hospital.  Additional Comments: Pt mother present to provide social/functional hx d/t pt cognitive level, [place], and fatigue       Objective   Vision: Impaired  Vision Exceptions: Wears glasses for reading  Hearing: Within functional limits    Orientation  Overall Orientation Status: Within Functional Limits    Balance  Sitting Balance: Stand by assistance  Standing Balance: Contact guard assistance  Standing Balance  Time: ~2 min  Activity: standing EOB with RW  Functional Mobility  Functional - Mobility Device: Rolling Walker  Activity: Other  Assist Level: Minimal assistance  Functional Mobility Comments: Pt completed functional mobility at bedside, ~5-6 steps to R [facility] requiring Min A for RW progression and balance throughout     ADL  Feeding: Modified independent ;Setup; Increased time to complete;Verbal cueing  Grooming: Stand by assistance;Setup;Verbal cueing; Increased time to complete (pt demo ability to blow nose requiring VCs to first remove NC O2)  UE Bathing: Minimal assistance;Setup;Verbal cueing; Increased time to complete  LE Bathing: Moderate assistance;Setup;Verbal cueing; Increased time to complete  UE Dressing: Minimal assistance;Setup;Verbal cueing; Increased time to complete  LE Dressing: Moderate assistance;Setup;Verbal cueing; Increased time to complete  Toileting: Moderate assistance;Setup; Increased time to complete  Tone RUE  RUE Tone: Normotonic  Tone LUE  LUE Tone: Normotonic  Coordination  Movements Are Fluid And Coordinated: No  Coordination and Movement description: Tremors;Right UE;Left UE    Bed mobility  Supine to Sit: Moderate assistance (Mod A required for BLE and trunk progression)  Sit to Supine: Contact guard assistance  Scooting: Minimal assistance  Comment: HOB elevated ~40 degrees throughout bed mobility.  Pt ed on bracing abdomen with pillow throughout maneuvers and demo good follow through  Transfers  Sit to stand: Minimal assistance  Stand to sit: Minimal assistance  Transfer Comments: VCs/TCs for proper hand placement throughout    Cognition  Overall Cognitive Status: Exceptions  Following Commands: Follows one step commands with repetition; Follows one step commands with increased time  Attention Span: Attends with cues to redirect  Safety Judgement: Decreased awareness of need for safety;Decreased awareness of need for assistance  Problem Solving: Assistance required to identify errors made;Assistance required to correct errors made  Insights: Decreased awareness of deficits  Initiation: Requires cues for some  Sequencing: Requires cues for some       Sensation  Overall Sensation Status: Impaired (Pt reports numbness/ tingling in B hads and feet, chronic d/t neuropathy)        LUE AROM (degrees)  LUE AROM : WFL  Left Hand AROM (degrees)  Left Hand AROM: WFL  RUE AROM (degrees)  RUE AROM : WFL  Right Hand AROM (degrees)  Right Hand AROM: WFL  LUE Strength  Gross LUE Strength: [facility]  L Shoulder Flex: 4-/5  L Shoulder Ext: 4-/5  L Elbow Flex: 4/5  L Elbow Ext: 4/5  L Hand General: 4/5  RUE Strength  Gross RUE Strength: Exceptions to [facility]  R Shoulder Flex: 4-/5  R Shoulder Ext: 4-/5  R Elbow Flex: 4/5  R Elbow Ext: 4/5  R Hand General: 4/5                   Plan   Plan  Times per week: 3-5 x/wk  Current Treatment Recommendations: Strengthening, Balance Training, Functional Mobility Training, Endurance Training, Cognitive Reorientation, Pain Management, Safety Education & Training, Patient/Caregiver Education & Training, Equipment Evaluation, Education, & procurement, Self-Care / ADL    AM-PAC Score        AM-[facility] Inpatient Daily Activity Raw Score: 16 (07/26/21 1318)  AM-PAC Inpatient ADL T-Scale Score : 35.96 (07/26/21 1318)  ADL Inpatient CMS 0-100% Score: 53.32 (07/26/21 1318)  ADL Inpatient CMS G-Code Modifier : CK (07/26/21 1318)    Goals  Short term goals  Time Frame for Short term goals: By discharge, pt will:  Short term goal 1: Demo Mod I for bed mobility to increase independence with ADLs and decrease risk for pressure injury  Short term goal 2: Demo Mod I with setup and increased time PRN for UB ADLs and grooming tasks  Short term goal 3: Demo CGA with setup and AE PRN for LB ADLs and toileting tasks  Short term goal 4: Demo 8+ minutes dynamic standing and reaching task to increase balance for safety and independence with ADLs/IADLs  Short term goal 5: Demo SBA for functional transfers and functional mobility with use of LRD PRN  Short term goal 6: Follow 3-step functional task to increase cognition for participation in ADLs/IADLs       Therapy Time   Individual Concurrent Group Co-treatment   Time In 1038         Time Out 1108         Minutes 30         Timed Code Treatment Minutes: 12 Minutes       Kayy Roland OTR/L

## 2021-07-27 ENCOUNTER — APPOINTMENT (OUTPATIENT)
Dept: CT IMAGING | Age: 51
DRG: 720 | End: 2021-07-27
Payer: MEDICARE

## 2021-07-27 ENCOUNTER — APPOINTMENT (OUTPATIENT)
Dept: GENERAL RADIOLOGY | Age: 51
DRG: 720 | End: 2021-07-27
Payer: MEDICARE

## 2021-07-27 LAB
ABSOLUTE EOS #: 0.03 K/UL (ref 0–0.44)
ABSOLUTE IMMATURE GRANULOCYTE: 0.32 K/UL (ref 0–0.3)
ABSOLUTE LYMPH #: 1.13 K/UL (ref 1.1–3.7)
ABSOLUTE MONO #: 1.35 K/UL (ref 0.1–1.2)
ALLEN TEST: ABNORMAL
ANION GAP SERPL CALCULATED.3IONS-SCNC: 16 MMOL/L (ref 9–17)
BASOPHILS # BLD: 0 % (ref 0–2)
BASOPHILS ABSOLUTE: 0.06 K/UL (ref 0–0.2)
BUN BLDV-MCNC: 18 MG/DL (ref 6–20)
BUN/CREAT BLD: ABNORMAL (ref 9–20)
C-REACTIVE PROTEIN: 291.7 MG/L (ref 0–5)
CALCIUM SERPL-MCNC: 8.7 MG/DL (ref 8.6–10.4)
CARBOXYHEMOGLOBIN: 0.5 % (ref 0–5)
CHLORIDE BLD-SCNC: 99 MMOL/L (ref 98–107)
CO2: 26 MMOL/L (ref 20–31)
CREAT SERPL-MCNC: 0.68 MG/DL (ref 0.5–0.9)
CULTURE: NORMAL
DIFFERENTIAL TYPE: ABNORMAL
EOSINOPHILS RELATIVE PERCENT: 0 % (ref 1–4)
FIO2: ABNORMAL
GFR AFRICAN AMERICAN: >60 ML/MIN
GFR NON-AFRICAN AMERICAN: >60 ML/MIN
GFR SERPL CREATININE-BSD FRML MDRD: ABNORMAL ML/MIN/{1.73_M2}
GFR SERPL CREATININE-BSD FRML MDRD: ABNORMAL ML/MIN/{1.73_M2}
GLUCOSE BLD-MCNC: 160 MG/DL (ref 70–99)
HCO3 VENOUS: 29.8 MMOL/L (ref 24–30)
HCT VFR BLD CALC: 37.9 % (ref 36.3–47.1)
HEMOGLOBIN: 11.9 G/DL (ref 11.9–15.1)
IMMATURE GRANULOCYTES: 2 %
LYMPHOCYTES # BLD: 6 % (ref 24–43)
Lab: NORMAL
MAGNESIUM: 1.6 MG/DL (ref 1.6–2.6)
MCH RBC QN AUTO: 29.3 PG (ref 25.2–33.5)
MCHC RBC AUTO-ENTMCNC: 31.4 G/DL (ref 28.4–34.8)
MCV RBC AUTO: 93.3 FL (ref 82.6–102.9)
METHEMOGLOBIN: ABNORMAL % (ref 0–1.5)
MODE: ABNORMAL
MONOCYTES # BLD: 7 % (ref 3–12)
NEGATIVE BASE EXCESS, VEN: ABNORMAL MMOL/L (ref 0–2)
NOTIFICATION TIME: ABNORMAL
NOTIFICATION: ABNORMAL
NRBC AUTOMATED: 0 PER 100 WBC
O2 DEVICE/FLOW/%: ABNORMAL
O2 SAT, VEN: 88 % (ref 60–85)
OXYHEMOGLOBIN: ABNORMAL % (ref 95–98)
PATIENT TEMP: 37
PCO2, VEN, TEMP ADJ: ABNORMAL MMHG (ref 39–55)
PCO2, VEN: 46.5 (ref 39–55)
PDW BLD-RTO: 13.3 % (ref 11.8–14.4)
PEEP/CPAP: ABNORMAL
PH VENOUS: 7.42 (ref 7.32–7.42)
PH, VEN, TEMP ADJ: ABNORMAL (ref 7.32–7.42)
PLATELET # BLD: 391 K/UL (ref 138–453)
PLATELET ESTIMATE: ABNORMAL
PMV BLD AUTO: 10.2 FL (ref 8.1–13.5)
PO2, VEN, TEMP ADJ: ABNORMAL MMHG (ref 30–50)
PO2, VEN: 57.1 (ref 30–50)
POSITIVE BASE EXCESS, VEN: 5 MMOL/L (ref 0–2)
POTASSIUM SERPL-SCNC: 3.2 MMOL/L (ref 3.7–5.3)
PROCALCITONIN: 0.16 NG/ML
PSV: ABNORMAL
PT. POSITION: ABNORMAL
RBC # BLD: 4.06 M/UL (ref 3.95–5.11)
RBC # BLD: ABNORMAL 10*6/UL
RESPIRATORY RATE: ABNORMAL
SAMPLE SITE: ABNORMAL
SEG NEUTROPHILS: 85 % (ref 36–65)
SEGMENTED NEUTROPHILS ABSOLUTE COUNT: 16.54 K/UL (ref 1.5–8.1)
SET RATE: ABNORMAL
SODIUM BLD-SCNC: 141 MMOL/L (ref 135–144)
SPECIMEN DESCRIPTION: NORMAL
TEXT FOR RESPIRATORY: ABNORMAL
TOPIRAMATE LEVEL: 1.6 UG/ML (ref 5–20)
TOTAL HB: ABNORMAL G/DL (ref 12–16)
TOTAL RATE: ABNORMAL
VT: ABNORMAL
WBC # BLD: 19.4 K/UL (ref 3.5–11.3)
WBC # BLD: ABNORMAL 10*3/UL

## 2021-07-27 PROCEDURE — 94660 CPAP INITIATION&MGMT: CPT

## 2021-07-27 PROCEDURE — APPSS45 APP SPLIT SHARED TIME 31-45 MINUTES: Performed by: PHYSICIAN ASSISTANT

## 2021-07-27 PROCEDURE — 94668 MNPJ CHEST WALL SBSQ: CPT

## 2021-07-27 PROCEDURE — 80048 BASIC METABOLIC PNL TOTAL CA: CPT

## 2021-07-27 PROCEDURE — 6370000000 HC RX 637 (ALT 250 FOR IP): Performed by: PHYSICIAN ASSISTANT

## 2021-07-27 PROCEDURE — 6370000000 HC RX 637 (ALT 250 FOR IP): Performed by: NURSE PRACTITIONER

## 2021-07-27 PROCEDURE — 74177 CT ABD & PELVIS W/CONTRAST: CPT

## 2021-07-27 PROCEDURE — 84157 ASSAY OF PROTEIN OTHER: CPT

## 2021-07-27 PROCEDURE — 87070 CULTURE OTHR SPECIMN AEROBIC: CPT

## 2021-07-27 PROCEDURE — 6370000000 HC RX 637 (ALT 250 FOR IP): Performed by: INTERNAL MEDICINE

## 2021-07-27 PROCEDURE — 71045 X-RAY EXAM CHEST 1 VIEW: CPT

## 2021-07-27 PROCEDURE — 94640 AIRWAY INHALATION TREATMENT: CPT

## 2021-07-27 PROCEDURE — 85025 COMPLETE CBC W/AUTO DIFF WBC: CPT

## 2021-07-27 PROCEDURE — 2580000003 HC RX 258: Performed by: NURSE PRACTITIONER

## 2021-07-27 PROCEDURE — 6360000002 HC RX W HCPCS: Performed by: FAMILY MEDICINE

## 2021-07-27 PROCEDURE — 36415 COLL VENOUS BLD VENIPUNCTURE: CPT

## 2021-07-27 PROCEDURE — 99232 SBSQ HOSP IP/OBS MODERATE 35: CPT | Performed by: PSYCHIATRY & NEUROLOGY

## 2021-07-27 PROCEDURE — 97530 THERAPEUTIC ACTIVITIES: CPT

## 2021-07-27 PROCEDURE — 6370000000 HC RX 637 (ALT 250 FOR IP): Performed by: STUDENT IN AN ORGANIZED HEALTH CARE EDUCATION/TRAINING PROGRAM

## 2021-07-27 PROCEDURE — 99254 IP/OBS CNSLTJ NEW/EST MOD 60: CPT | Performed by: INTERNAL MEDICINE

## 2021-07-27 PROCEDURE — 6370000000 HC RX 637 (ALT 250 FOR IP): Performed by: FAMILY MEDICINE

## 2021-07-27 PROCEDURE — 84145 PROCALCITONIN (PCT): CPT

## 2021-07-27 PROCEDURE — 87040 BLOOD CULTURE FOR BACTERIA: CPT

## 2021-07-27 PROCEDURE — 6360000002 HC RX W HCPCS: Performed by: PHYSICIAN ASSISTANT

## 2021-07-27 PROCEDURE — 83605 ASSAY OF LACTIC ACID: CPT

## 2021-07-27 PROCEDURE — 2580000003 HC RX 258: Performed by: STUDENT IN AN ORGANIZED HEALTH CARE EDUCATION/TRAINING PROGRAM

## 2021-07-27 PROCEDURE — 76937 US GUIDE VASCULAR ACCESS: CPT

## 2021-07-27 PROCEDURE — 6360000002 HC RX W HCPCS: Performed by: NURSE PRACTITIONER

## 2021-07-27 PROCEDURE — 2700000000 HC OXYGEN THERAPY PER DAY

## 2021-07-27 PROCEDURE — 86140 C-REACTIVE PROTEIN: CPT

## 2021-07-27 PROCEDURE — 94761 N-INVAS EAR/PLS OXIMETRY MLT: CPT

## 2021-07-27 PROCEDURE — 6360000004 HC RX CONTRAST MEDICATION: Performed by: PHYSICIAN ASSISTANT

## 2021-07-27 PROCEDURE — 83735 ASSAY OF MAGNESIUM: CPT

## 2021-07-27 PROCEDURE — 83986 ASSAY PH BODY FLUID NOS: CPT

## 2021-07-27 PROCEDURE — 2060000000 HC ICU INTERMEDIATE R&B

## 2021-07-27 PROCEDURE — 94669 MECHANICAL CHEST WALL OSCILL: CPT

## 2021-07-27 PROCEDURE — 87205 SMEAR GRAM STAIN: CPT

## 2021-07-27 PROCEDURE — APPSS30 APP SPLIT SHARED TIME 16-30 MINUTES: Performed by: NURSE PRACTITIONER

## 2021-07-27 PROCEDURE — 82805 BLOOD GASES W/O2 SATURATION: CPT

## 2021-07-27 PROCEDURE — 95816 EEG AWAKE AND DROWSY: CPT

## 2021-07-27 PROCEDURE — 87075 CULTR BACTERIA EXCEPT BLOOD: CPT

## 2021-07-27 PROCEDURE — 99233 SBSQ HOSP IP/OBS HIGH 50: CPT | Performed by: INTERNAL MEDICINE

## 2021-07-27 RX ORDER — FENTANYL CITRATE 50 UG/ML
25 INJECTION, SOLUTION INTRAMUSCULAR; INTRAVENOUS ONCE
Status: COMPLETED | OUTPATIENT
Start: 2021-07-27 | End: 2021-07-27

## 2021-07-27 RX ORDER — PREDNISONE 20 MG/1
20 TABLET ORAL DAILY
Status: DISCONTINUED | OUTPATIENT
Start: 2021-08-02 | End: 2021-07-28

## 2021-07-27 RX ORDER — POTASSIUM CHLORIDE 7.45 MG/ML
10 INJECTION INTRAVENOUS PRN
Status: DISCONTINUED | OUTPATIENT
Start: 2021-07-27 | End: 2021-08-03 | Stop reason: HOSPADM

## 2021-07-27 RX ORDER — PREDNISONE 10 MG/1
10 TABLET ORAL DAILY
Status: DISCONTINUED | OUTPATIENT
Start: 2021-08-05 | End: 2021-07-28

## 2021-07-27 RX ORDER — SODIUM CHLORIDE, SODIUM LACTATE, POTASSIUM CHLORIDE, CALCIUM CHLORIDE 600; 310; 30; 20 MG/100ML; MG/100ML; MG/100ML; MG/100ML
INJECTION, SOLUTION INTRAVENOUS CONTINUOUS
Status: DISCONTINUED | OUTPATIENT
Start: 2021-07-27 | End: 2021-08-03

## 2021-07-27 RX ORDER — MAGNESIUM SULFATE IN WATER 40 MG/ML
2000 INJECTION, SOLUTION INTRAVENOUS ONCE
Status: COMPLETED | OUTPATIENT
Start: 2021-07-27 | End: 2021-07-27

## 2021-07-27 RX ORDER — PREDNISONE 20 MG/1
40 TABLET ORAL DAILY
Status: DISCONTINUED | OUTPATIENT
Start: 2021-07-27 | End: 2021-07-28

## 2021-07-27 RX ORDER — POTASSIUM CHLORIDE 20 MEQ/1
40 TABLET, EXTENDED RELEASE ORAL PRN
Status: DISCONTINUED | OUTPATIENT
Start: 2021-07-27 | End: 2021-08-03 | Stop reason: HOSPADM

## 2021-07-27 RX ORDER — LORAZEPAM 2 MG/ML
1 INJECTION INTRAMUSCULAR EVERY 6 HOURS PRN
Status: DISCONTINUED | OUTPATIENT
Start: 2021-07-27 | End: 2021-08-03 | Stop reason: HOSPADM

## 2021-07-27 RX ORDER — POTASSIUM CHLORIDE 20 MEQ/1
40 TABLET, EXTENDED RELEASE ORAL ONCE
Status: COMPLETED | OUTPATIENT
Start: 2021-07-27 | End: 2021-07-27

## 2021-07-27 RX ORDER — LIDOCAINE 4 G/G
1 PATCH TOPICAL DAILY
Status: DISCONTINUED | OUTPATIENT
Start: 2021-07-27 | End: 2021-08-03 | Stop reason: HOSPADM

## 2021-07-27 RX ADMIN — SPIRONOLACTONE 50 MG: 25 TABLET ORAL at 08:36

## 2021-07-27 RX ADMIN — FOLIC ACID 1 MG: 1 TABLET ORAL at 08:36

## 2021-07-27 RX ADMIN — IOPAMIDOL 75 ML: 755 INJECTION, SOLUTION INTRAVENOUS at 16:27

## 2021-07-27 RX ADMIN — BUDESONIDE AND FORMOTEROL FUMARATE DIHYDRATE 2 PUFF: 160; 4.5 AEROSOL RESPIRATORY (INHALATION) at 08:12

## 2021-07-27 RX ADMIN — ENOXAPARIN SODIUM 40 MG: 40 INJECTION SUBCUTANEOUS at 08:39

## 2021-07-27 RX ADMIN — PREGABALIN 200 MG: 100 CAPSULE ORAL at 08:36

## 2021-07-27 RX ADMIN — HYDROXYZINE HYDROCHLORIDE 25 MG: 25 TABLET ORAL at 08:37

## 2021-07-27 RX ADMIN — TOPIRAMATE 50 MG: 25 TABLET, FILM COATED ORAL at 08:38

## 2021-07-27 RX ADMIN — SODIUM CHLORIDE, POTASSIUM CHLORIDE, SODIUM LACTATE AND CALCIUM CHLORIDE: 600; 310; 30; 20 INJECTION, SOLUTION INTRAVENOUS at 22:24

## 2021-07-27 RX ADMIN — CARBAMAZEPINE 200 MG: 200 TABLET ORAL at 14:28

## 2021-07-27 RX ADMIN — PREGABALIN 200 MG: 100 CAPSULE ORAL at 21:04

## 2021-07-27 RX ADMIN — TRAZODONE HYDROCHLORIDE 50 MG: 50 TABLET ORAL at 21:04

## 2021-07-27 RX ADMIN — IOHEXOL 50 ML: 240 INJECTION, SOLUTION INTRATHECAL; INTRAVASCULAR; INTRAVENOUS; ORAL at 14:22

## 2021-07-27 RX ADMIN — FERROUS SULFATE TAB EC 325 MG (65 MG FE EQUIVALENT) 325 MG: 325 (65 FE) TABLET DELAYED RESPONSE at 08:36

## 2021-07-27 RX ADMIN — TOPIRAMATE 50 MG: 25 TABLET, FILM COATED ORAL at 21:02

## 2021-07-27 RX ADMIN — FERROUS SULFATE TAB EC 325 MG (65 MG FE EQUIVALENT) 325 MG: 325 (65 FE) TABLET DELAYED RESPONSE at 21:03

## 2021-07-27 RX ADMIN — BUDESONIDE AND FORMOTEROL FUMARATE DIHYDRATE 2 PUFF: 160; 4.5 AEROSOL RESPIRATORY (INHALATION) at 20:47

## 2021-07-27 RX ADMIN — IPRATROPIUM BROMIDE AND ALBUTEROL SULFATE 1 AMPULE: .5; 3 SOLUTION RESPIRATORY (INHALATION) at 20:47

## 2021-07-27 RX ADMIN — PREGABALIN 200 MG: 100 CAPSULE ORAL at 14:27

## 2021-07-27 RX ADMIN — PIPERACILLIN AND TAZOBACTAM 3375 MG: 3; .375 INJECTION, POWDER, FOR SOLUTION INTRAVENOUS at 12:00

## 2021-07-27 RX ADMIN — MAGNESIUM SULFATE 2000 MG: 2 INJECTION INTRAVENOUS at 14:30

## 2021-07-27 RX ADMIN — BUSPIRONE HYDROCHLORIDE 15 MG: 15 TABLET ORAL at 14:27

## 2021-07-27 RX ADMIN — PREDNISONE 40 MG: 20 TABLET ORAL at 14:46

## 2021-07-27 RX ADMIN — CARBAMAZEPINE 200 MG: 200 TABLET ORAL at 08:37

## 2021-07-27 RX ADMIN — BACLOFEN 10 MG: 10 TABLET ORAL at 21:03

## 2021-07-27 RX ADMIN — BACLOFEN 10 MG: 10 TABLET ORAL at 08:37

## 2021-07-27 RX ADMIN — IPRATROPIUM BROMIDE AND ALBUTEROL SULFATE 1 AMPULE: .5; 3 SOLUTION RESPIRATORY (INHALATION) at 12:01

## 2021-07-27 RX ADMIN — PIPERACILLIN AND TAZOBACTAM 3375 MG: 3; .375 INJECTION, POWDER, FOR SOLUTION INTRAVENOUS at 18:53

## 2021-07-27 RX ADMIN — HYDROXYZINE HYDROCHLORIDE 25 MG: 25 TABLET ORAL at 21:02

## 2021-07-27 RX ADMIN — BUSPIRONE HYDROCHLORIDE 15 MG: 15 TABLET ORAL at 08:37

## 2021-07-27 RX ADMIN — ACETAMINOPHEN 650 MG: 325 TABLET ORAL at 19:01

## 2021-07-27 RX ADMIN — FENTANYL CITRATE 25 MCG: 50 INJECTION, SOLUTION INTRAMUSCULAR; INTRAVENOUS at 21:02

## 2021-07-27 RX ADMIN — POTASSIUM CHLORIDE 40 MEQ: 1500 TABLET, EXTENDED RELEASE ORAL at 08:39

## 2021-07-27 RX ADMIN — METHYLPREDNISOLONE SODIUM SUCCINATE 40 MG: 40 INJECTION, POWDER, FOR SOLUTION INTRAMUSCULAR; INTRAVENOUS at 01:30

## 2021-07-27 RX ADMIN — BACLOFEN 10 MG: 10 TABLET ORAL at 14:27

## 2021-07-27 RX ADMIN — AMLODIPINE BESYLATE 5 MG: 5 TABLET ORAL at 08:37

## 2021-07-27 RX ADMIN — PIPERACILLIN AND TAZOBACTAM 3375 MG: 3; .375 INJECTION, POWDER, FOR SOLUTION INTRAVENOUS at 02:10

## 2021-07-27 RX ADMIN — BUSPIRONE HYDROCHLORIDE 15 MG: 15 TABLET ORAL at 21:05

## 2021-07-27 RX ADMIN — ROPINIROLE HYDROCHLORIDE 1 MG: 1 TABLET, FILM COATED ORAL at 21:04

## 2021-07-27 RX ADMIN — IPRATROPIUM BROMIDE AND ALBUTEROL SULFATE 1 AMPULE: .5; 3 SOLUTION RESPIRATORY (INHALATION) at 08:12

## 2021-07-27 RX ADMIN — POTASSIUM CHLORIDE 20 MEQ: 1500 TABLET, EXTENDED RELEASE ORAL at 12:00

## 2021-07-27 RX ADMIN — CARBAMAZEPINE 200 MG: 200 TABLET ORAL at 21:17

## 2021-07-27 RX ADMIN — SODIUM CHLORIDE, PRESERVATIVE FREE 10 ML: 5 INJECTION INTRAVENOUS at 01:31

## 2021-07-27 ASSESSMENT — ENCOUNTER SYMPTOMS
ABDOMINAL DISTENTION: 0
COUGH: 1
WHEEZING: 1
VOMITING: 0
DIARRHEA: 0
BACK PAIN: 1
SHORTNESS OF BREATH: 1
EYE DISCHARGE: 0
COLOR CHANGE: 0
APNEA: 0
ABDOMINAL PAIN: 1
NAUSEA: 0

## 2021-07-27 ASSESSMENT — PAIN DESCRIPTION - PAIN TYPE: TYPE: ACUTE PAIN

## 2021-07-27 ASSESSMENT — PAIN SCALES - GENERAL
PAINLEVEL_OUTOF10: 10
PAINLEVEL_OUTOF10: 10

## 2021-07-27 ASSESSMENT — PAIN DESCRIPTION - LOCATION: LOCATION: BACK

## 2021-07-27 NOTE — PROGRESS NOTES
Patient - Edelmira Colon   MRN -  [id number]   [facility] # - [de-identified]   - 1970        Date of evaluation -  2021    REASON FOR THE CONSULTATION:  hypoxia   HISTORY OF PRESENT ILLNESS:    Edelmira Colon is a 45y.o. year old female here for evaluation of admitted to hospital in 21 due to back pain nd head ache . She was found to have thoracic spine compression fx . She has left sided abdominal pain  Without nausea or vomiting but cramping . Her xray chest has been worsening and she has poor cough due to pain in her lower rib cage on left side with cough and deep breathing . Has been having progressive hypoxia now on high flow oxygen . She is active smoker 1 ppd and has wheezing , copd   2021     Subjective  Patient is weaned off high flow oxygen and is on 4 L supplemental oxygen via nasal cannula sats are greater than 92%. She is more awake and has tolerated MetaNeb. Discussed with patient's mother and nursing staff in detail. She still has wheezing.     Immunization   Immunization History   Administered Date(s) Administered    COVID-19, Pfizer, PF, 30mcg/0.3mL 2021, 2021    Influenza Vaccine, unspecified formulation 10/18/2018    Influenza Virus Vaccine 2017, 10/18/2018, 09/10/2019, 10/01/2020    Influenza, Quadv, IM, (6 mo and older Fluzone, Flulaval, Fluarix and 3 yrs and older Afluria) 09/10/2019    MMR 2006    Pneumococcal Polysaccharide ([id number]) 2020    Tdap (Boostrix, Adacel) 2020        Pneumococcal Vaccine     [] Up to date    [] Indicated   [] Refused  [] Contraindicated       Influenza Vaccine   [] Up to date    [] Indicated   [] Refused  [] Contraindicated            Pulmonary Rehab     [] completed    [] Indicated   [] Refused  [] Contraindicated       PAST MEDICAL HISTORY:       Diagnosis Date    Acute respiratory failure with hypoxia ([facility] [address] 10/16/2020    Alcohol withdrawal syndrome, with delirium ([facility] [address] 2019    Alcoholism ([facility] [address]     Anemia 10/2020    GI bleed    Astrocytoma ([facility] [address] - 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24, the patient underwent 2 surgical resections without known recurrence 10/23/2020    Closed fracture of lateral portion of left tibial plateau 85/76/5090    COPD (chronic obstructive pulmonary disease) ([facility] [address]     CO2 retainer, on Bipap at night for this, Dr. Chelsie Oconnor ( last visit 11/20/2020 and note on chart )    Depression     bipolar, major depressive disorder, ptsd, anxiety    Dysphagia     GI bleed 10/2020    Hypertension     Memory loss     Oxygen dependent     pt stated not needed as of 12/9/2020    Pain, joint, ankle and foot     Pancreatic lesion 10/2020    Dr. Genesis Green working up pt    Peripheral neuropathy     Seizures ([facility] [address]     also baseline tremors-last sz summer 2020    Tension headache     Under care of team 07/01/2021    neuro-Dr [facility]-last visit june 2021    Under care of team 07/01/2021    pain management-Hamzah jimenez-last visit june 2021    Under care of team 07/01/2021    pulmonology-Dr [facility]-last virtual visit feb 2021    Under care of team 07/01/2021    psych-bahnfeldt NP-telemed-last visit may 2021    Under care of team 07/01/2021    gx-[address]-last visit june 2021    Wellness examination 07/01/2021    pcp-Ludivina grimes-last visit may 2021         Family History:       Problem Relation Age of Onset    Other Father     Cancer Maternal Grandmother     Heart Disease Paternal Grandmother     Esophageal Cancer Maternal Aunt        SURGICAL HISTORY:   Past Surgical History:   Procedure Laterality Date    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    COLONOSCOPY N/A 10/22/2020    COLONOSCOPY DIAGNOSTIC performed by Kimi Raza MD at [id number] [facility] [address] Shaikh (LOWER) N/A 12/9/2020    ENDOSCOPIC ULTRASOUND, UPPER WITH LINEAR SCOPE FOR BIOPSY OF MASS ON HEAD OF PANCREAS performed by Kulwant Gaston MD at [address] Left 7-3-13    ORIF tibial plateau    FRACTURE SURGERY Right     small finger metacarpal fracture    HAND SURGERY      pins    HYSTERECTOMY  2003    UPPER GASTROINTESTINAL ENDOSCOPY N/A 10/22/2020    EGD BIOPSY performed by Chava Rosado MD at [address] [facility] N/A 4/5/2021    EGD BIOPSY performed by Chava Rosado MD at [address]:    Social History     Socioeconomic History    Marital status: Single     Spouse name: None    Number of children: None    Years of education: None    Highest education level: None   Occupational History    None   Tobacco Use    Smoking status: Current Every Day Smoker     Packs/day: 0.50     Years: 30.00     Pack years: 15.00     Types: Cigarettes    Smokeless tobacco: Never Used    Tobacco comment: plans on quitting in the future    Vaping Use    Vaping Use: Never used   Substance and Sexual Activity    Alcohol use: Not Currently     Alcohol/week: 0.0 standard drinks    Drug use: Yes     Frequency: 2.0 times per week     Types: Marijuana    Sexual activity: None   Other Topics Concern    None   Social History Narrative    None     Social Determinants of Health     Financial Resource Strain: Medium Risk    Difficulty of Paying Living Expenses: Somewhat hard   Food Insecurity: No Food Insecurity    Worried About Running Out of Food in the Last Year: Never true    [place] of Food in the Last Year: Never true   Transportation Needs:     Lack of Transportation (Medical):      Lack of Transportation (Non-Medical):    Physical Activity:     Days of Exercise per Week:     Minutes of Exercise per Session:    Stress:     Feeling of Stress :    Social Connections:     Frequency of Communication with Friends and Family:     Frequency of Social Gatherings with Friends and Family:     Attends Jain Services:     Active Member of Clubs or Organizations:     Attends Club or Organization Meetings:     Marital Status:    Intimate Partner Violence:     Fear of Current or Ex-Partner:     Emotionally Abused:     Physically Abused:     Sexually Abused:         TOBACCO:   reports that she has been smoking cigarettes. She has a 15.00 pack-year smoking history. She has never used smokeless tobacco.  ETOH:   reports previous alcohol use. ALLERGIES:  No Known Allergies    Home Meds:   Prior to Admission medications    Medication Sig Start Date End Date Taking? Authorizing Provider   acamprosate (CAMPRAL) 333 MG tablet Take 2 tablets by mouth 3 times daily 7/16/21 8/15/21  LOI Zuluaga NP   sertraline (ZOLOFT) 50 MG tablet Take 3 tablets by mouth daily for 7 days, THEN 2 tablets daily for 7 days, THEN 1 tablet daily for 7 days. 7/16/21 8/6/21  LOI Zuluaga NP   FLUoxetine (PROZAC) 20 MG tablet Take 0.5 tablets by mouth daily for 7 days, THEN 1 tablet daily for 23 days. 7/16/21 8/15/21  LOI Zuluaga NP   traZODone (DESYREL) 50 MG tablet TAKE 1 AND 1/2 TABLET BY MOUTH ONCE NIGHTLY AS NEEDED FOR SLEEP 7/16/21   LOI Zuluaga NP   thermotabs (MEDI-LYTE) TABS tablet Take 1 tablet by mouth daily for 3 days 7/9/21 7/12/21  LOI Cohn NP   carBAMazepine (TEGRETOL XR) 200 MG extended release tablet Take 1 tablet by mouth 2 times daily 7/6/21   Rogelio Yoder MD   topiramate (TOPAMAX) 25 MG tablet Take 2 tablets by mouth daily 7/6/21   Rogelio Yoder MD   pregabalin (LYRICA) 200 MG capsule Take 1 capsule by mouth 3 times daily for 90 days.  7/2/21 9/30/21  Rogelio Yoder MD   KLOR-CON M20 20 MEQ extended release tablet TAKE ONE TABLET BY MOUTH DAILY 6/24/21   Ludivina Webb MD   amLODIPine (NORVASC) 5 MG tablet TAKE ONE TABLET BY MOUTH DAILY 6/3/21   Ludivina Webb MD   busPIRone (BUSPAR) 15 MG tablet Take 1 tablet by mouth 3 times daily 5/5/21   Ludivina Webb MD   baclofen (LIORESAL) 10 MG tablet Take 1 tablet by mouth 3 times daily 5/25/21   Ludivina Tidwell MD   ibuprofen (ADVIL;MOTRIN) 600 MG tablet Take 1 tablet by mouth 3 times daily as needed for Pain 5/25/21   Ludivina Tidwell MD   sertraline (ZOLOFT) 100 MG tablet Take 2 tablets by mouth daily 5/17/21    Rash, APRN - NP   ferrous sulfate (IRON 325) 325 (65 Fe) MG tablet Take 1 tablet by mouth 2 times daily 4/22/21   Ludivina Tidwell MD   rOPINIRole (REQUIP) 1 MG tablet Take 1 tablet by mouth nightly 4/1/21   Ludivina Tidwell MD   budesonide-formoterol [facility]) 160-4.5 MCG/ACT AERO Inhale 2 puffs into the lungs 2 times daily 3/31/21   Ludivina Tidwell MD   hydrOXYzine (ATARAX) 50 MG tablet TAKE ONE TABLET BY MOUTH THREE TIMES A DAY 3/12/21   Ludivina Tidwell MD   tiotropium (SPIRIVA RESPIMAT) 2.5 MCG/ACT AERS inhaler Inhale 2 puffs into the lungs daily 2/26/21 7/8/21  Anna Tatum MD   sodium chloride (ALTAMIST SPRAY) 0.65 % nasal spray 1 spray by Nasal route as needed for Congestion 12/28/20   Ludivina Tidwell MD   ACETAMINOPHEN EXTRA STRENGTH 500 MG tablet Take 500 mg by mouth 3 times daily as needed 5/8/20   Laisha Newby MD   albuterol sulfate HFA (VENTOLIN HFA) 108 (90 Base) MCG/ACT inhaler Inhale 2 puffs into the lungs 4 times daily as needed for Wheezing 6/11/20   Ludivina Tidwell MD   vitamin B-1 (THIAMINE) 100 MG tablet Take 1 tablet by mouth daily 7/12/19   Ludivina Tidwell MD   vitamin D (ERGOCALCIFEROL) 15982 units CAPS capsule Take 1 capsule by mouth once a week 6/19/19   Ludivina Tidwell MD   folic acid (FOLVITE) 1 MG tablet Take 1 tablet by mouth daily 6/19/19   Ludivina Tidwell MD     CURRENT MEDS :  Scheduled Meds:   spironolactone  50 mg Oral Daily    ipratropium-albuterol  1 ampule Inhalation 4x daily    polyethylene glycol  17 g Oral BID    lactulose  20 g Oral TID    carBAMazepine  200 mg Oral TID    methylPREDNISolone  40 mg Intravenous Q12H    topiramate  50 mg Oral BID    amLODIPine  5 mg Recent Labs     07/25/21  0701 07/26/21  0623 07/27/21  0511   WBC 17.7* 19.3* 19.4*   HGB 12.2 11.5* 11.9    391 391     BMP:    Recent Labs     07/25/21  0701 07/26/21  0623 07/27/21  0511    139 141   K 3.8 3.5* 3.2*   CL 98 96* 99   CO2 28 27 26   BUN 18 18 18   CREATININE 0.75 0.80 0.68   GLUCOSE 117* 114* 160*     Hepatic:   No results for input(s): AST, ALT, ALB, BILITOT, ALKPHOS in the last 72 hours. Amylase:   Lab Results   Component Value Date    AMYLASE 78 07/22/2021     Lipase:   Lab Results   Component Value Date    LIPASE 67 07/22/2021     Troponin: No results for input(s): TROPONINI in the last 72 hours. BNP: No results for input(s): BNP in the last 72 hours. Lipids: No results for input(s): CHOL, HDL in the last 72 hours. Invalid input(s): LDLCALCU  ABGs: No results found for: PHART, PO2ART, UNC8ZLD  INR: No results for input(s): INR in the last 72 hours. Thyroid:   Lab Results   Component Value Date    TSH 0.68 12/23/2020     Urinalysis: No results for input(s): BACTERIA, BLOODU, CLARITYU, COLORU, PHUR, PROTEINU, RBCUA, SPECGRAV, BILIRUBINUR, NITRU, WBCUA, LEUKOCYTESUR, GLUCOSEU in the last 72 hours. XR THORACIC SPINE (3 VIEWS)    Result Date: 7/23/2021  Lumbar spine: Superior endplate fracture L5 which appears acute to subacute. No subluxation. Other vertebra well maintained. Spine significant compression deformity T6 with there are endplate loss in height T8, T9 and T10. No subluxation. Mild levo scoliotic curvature. Osteopenia complicates assessment. Pedicles are intact. Low lung volumes complicate assessment. There is suggestion of pleural effusions. XR LUMBAR SPINE (2-3 VIEWS)    Result Date: 7/23/2021  Lumbar spine: Superior endplate fracture L5 which appears acute to subacute. No subluxation. Other vertebra well maintained. Spine significant compression deformity T6 with there are endplate loss in height T8, T9 and T10. No subluxation.   Mild levo scoliotic curvature. Osteopenia complicates assessment. Pedicles are intact. Low lung volumes complicate assessment. There is suggestion of pleural effusions. XR ABDOMEN (KUB) (SINGLE AP VIEW)    Result Date: 7/22/2021  Mild dilation of the right hemicolon and patchy small bowel gas most likely due to ileus. XR ACUTE ABD SERIES CHEST 1 VW    Result Date: 7/24/2021  Bilateral airspace disease and pleural effusions. Findings in the upper lobes appear increased compared to prior, left greater than right. Findings may be related to asymmetric edema with superimposed pneumonia not excluded. Gas and stool are seen throughout nondilated bowel loops with few nonspecific air-fluid levels in the right lower abdomen, likely in small bowel. Findings may be physiologic or related to mild ileus. No evidence of obstruction or free air. CT HEAD WO CONTRAST    Result Date: 7/25/2021  No acute intracranial abnormality. Suboccipital craniectomy. MRI THORACIC SPINE WO CONTRAST    Result Date: 7/23/2021  1. Limited examination. 2. Compression deformities involving T6, T8, T9 and T11. These are likely chronic in nature. Diffusely decreased T1 and T2 marrow signal within the T6 vertebral body compatible with the sclerosis seen on the prior CT. 3. There is minimal bone marrow edema involving the left lateral elements of T6 and T7. Unclear whether this is degenerative in nature or perhaps sequelae of recent or remote trauma. 4. No significant spinal canal stenosis of the thoracic spine. 5. Moderate bilateral neural foraminal narrowing at T6-T7. 6. Bilateral pleural effusions, left greater than right. MRI LUMBAR SPINE WO CONTRAST    Result Date: 7/23/2021  1. Acute compression deformity of the superior endplate of L5 with approximately 35% loss of height. No significant bulging of the posterior cortex. 2. No significant spinal canal stenosis of the lumbar spine.  3. Neural foraminal narrowing at L3-L4 through L5-S1. 4. Minimal retrolisthesis at L5-S1. XR CHEST PORTABLE    Result Date: 7/25/2021  1. Increasing vascular congestion and left perihilar opacity. 2.  Pleural effusions and basilar opacities are otherwise without significant change. XR CHEST PORTABLE    Result Date: 7/23/2021  Interval development of bibasilar infiltrates and small pleural effusions which could represent dependent edema, pneumonia or aspiration. XR CHEST PORTABLE    Result Date: 7/22/2021  No radiologic evidence of acute cardiopulmonary disease. CT CHEST ABDOMEN PELVIS W CONTRAST    Result Date: 7/21/2021  Probable acute diverticulitis or colitis left colon. No evidence of perforation or abscess at this time. Chronic pancreatitis. Multiple compression fractures some of which are new since the previous exam. Lingular ground-glass infiltrate. Recommend follow-up to resolution. Intra-atrial lipoma right atrium. Cardiac echo may be helpful. CT LUMBAR SPINE TRAUMA RECONSTRUCTION    Result Date: 7/21/2021  CT thoracic spine: 1. Decrease in height in T6 compared to prior study compatible with worsening compression with subacute etiology and mild protrusion of the dorsal aspect of T6. Narrowed T6-T7 neural foramina. 2.  Chronic superior height T8, T9, and T11 without interval change from prior study. 3.  Mild prevertebral soft tissue prominence T6 without evidence of abscess formation. CT lumbar spine: 1. No traumatic malalignment. 2.  Compression fracture superior L5 with subacute appearance. 3.  Calcifications in the abdomen incompletely included appearance suggesting pancreatic calcifications. RECOMMENDATIONS: Please reference CT chest, abdomen and pelvis of same day. CT THORACIC SPINE TRAUMA RECONSTRUCTION    Result Date: 7/21/2021  CT thoracic spine: 1. Decrease in height in T6 compared to prior study compatible with worsening compression with subacute etiology and mild protrusion of the dorsal aspect of T6.   Narrowed T6-T7 neural foramina. 2.  Chronic superior height T8, T9, and T11 without interval change from prior study. 3.  Mild prevertebral soft tissue prominence T6 without evidence of abscess formation. CT lumbar spine: 1. No traumatic malalignment. 2.  Compression fracture superior L5 with subacute appearance. 3.  Calcifications in the abdomen incompletely included appearance suggesting pancreatic calcifications. RECOMMENDATIONS: Please reference CT chest, abdomen and pelvis of same day. IMPRESSION:    Principal Problem:    Sepsis ([address]  Active Problems:    Essential hypertension    Tobacco use    Seizure disorder (HCC)    COPD with acute exacerbation (HCC)    Acute respiratory failure with hypoxia (HCC)    Recurrent major depressive disorder ([address]    Astrocytoma ([facility] [address] - 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24, the patient underwent 2 surgical resections without known recurrence    Metabolic encephalopathy    AMAN (generalized anxiety disorder)    Chronic peripheral neuropathic pain    History of alcohol abuse    Personal history of astrocytoma    Marijuana abuse    Closed fracture of fifth thoracic vertebra (HCC)    Diverticulitis of large intestine without perforation or abscess without bleeding    Elevated brain natriuretic peptide (BNP) level    Elevated alkaline phosphatase level    Chronic pancreatitis (HCC)    Erythema ab igne    Compression fracture of thoracic vertebra (HCC)    Compression of lumbar vertebra (HCC)  Resolved Problems:    * No resolved hospital problems. *    Bibasilar atelectasis/pneumonia due to poor secretion clearance     :                PLAN:        Continue secretion management with MetaNeb and I-S and deep breathing  Change to prednisone taper  Increase activity and mobility. This will also aid with secretion clearance   antibiotics per infectious disease  Continue bronchodilator therapy  X-ray chest today  D/w Dr Silke Bernstein    I hope this updates you on my evaluation and clinical thinking.  Thank you for allowing me to participate in his care.      Sincerely,      Electronically signed by Marino Phelps MD on 7/27/2021 at 10:41 AM

## 2021-07-27 NOTE — PROGRESS NOTES
Physical Therapy        Physical Therapy Cancel Note      DATE: 2021    NAME: Luis Gentile  MRN: [id number]   : 1970      Patient not seen this date for Physical Therapy due to: Other: Pt is unable to keep her eye's open or follow commands. Pt isn't appropriate for a treatment today. Will check on pt tomorrow. RN informed. Pt's mother is present.       Electronically signed by Lawyer Esha PTA on 2021 at 1:47 PM

## 2021-07-27 NOTE — PROGRESS NOTES
Occupational Therapy  Facility/Department: [facility] CAR 2  Daily Treatment Note  NAME: Amarilis Pedersen  : 1970  MRN: [id number]    Date of Service: 2021    Discharge Recommendations:  Patient would benefit from continued therapy after discharge       Assessment   Performance deficits / Impairments: Decreased functional mobility ; Decreased ADL status; Decreased strength;Decreased safe awareness;Decreased cognition;Decreased endurance;Decreased balance;Decreased high-level IADLs;Decreased coordination  Prognosis: Fair  Patient Education: OT POC, transfer/walker safety, importance of participation in therapy with poor return. REQUIRES OT FOLLOW UP: Yes  Activity Tolerance  Activity Tolerance: Treatment limited secondary to decreased cognition  Activity Tolerance: Pt tearful throughout session. Safety Devices  Safety Devices in place: Yes  Type of devices: Call light within reach; Chair alarm in place;Gait belt;Patient at risk for falls; Left in chair;Nurse notified         Patient Diagnosis(es): The primary encounter diagnosis was Septicemia ([address] Diagnoses of Compression fracture of thoracic vertebra, initial encounter, unspecified thoracic vertebral level (HCC) and Compression fracture of lumbar vertebra, initial encounter, unspecified lumbar vertebral level ([address] were also pertinent to this visit.       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 has a past surgical history that includes Hysterectomy (2003); Brain tumor excision (1989); fracture surgery (Left, 7-3-13); fracture surgery (Right); Upper gastrointestinal endoscopy (N/A, 10/22/2020); Colonoscopy (N/A, 10/22/2020); Endoscopic ultrasonography, GI (N/A, 12/9/2020); Upper gastrointestinal endoscopy (N/A, 4/5/2021); and Hand surgery. Restrictions  Restrictions/Precautions  Restrictions/Precautions: Fall Risk, Up as Tolerated, Seizure  Required Braces or Orthoses?: No  Position Activity Restriction  Other position/activity restrictions: no activity restrictions per Andre Alexander with NS. Maintain SpO2 >94%, O2 NC 4 Lm. up with assist  Subjective   General  Patient assessed for rehabilitation services?: Yes  Family / Caregiver Present:  (mother)  General Comment  Vital Signs  Patient Currently in Pain: Denies   Orientation  Orientation  Overall Orientation Status: Impaired  Orientation Level: Disoriented to place; Disoriented to time;Disoriented to situation;Oriented to person  Objective    ADL  Additional Comments: Pt unable to functionally participate in ADL care this day. Pt tearful throughout session needing continuous verbal cues for redirection to stay on task with poor return. Balance  Sitting Balance: Contact guard assistance (Seated EOB x3 minutes.)  Standing Balance: Minimal assistance  Standing Balance  Time: 30 seconds; 2 minutes  Activity: Static standing EOB using RW, short functional mobility EOB/chair. Comment: Pt displayed occassional flexion in B knees needing to sit after 30 seconds. Functional Mobility  Functional - Mobility Device: Rolling Walker  Activity: Other  Assist Level:  Moderate assistance  Functional Mobility Comments: Multiple verbal cues for walker placement and step gait, pt initially moving each foot forward minimally requiring exact direction to  each foot and move it forward with fair return. Pt able to take a little longer stride after a few steps. Mod A to advance walker and turn walker for pt to sit down into chair. Therapist stood in front of pt holding gait belt to assist pt with moving forward. Bed mobility  Supine to Sit: Minimal assistance  Scooting: Contact guard assistance  Transfers  Sit to stand: Minimal assistance  Stand to sit: Minimal assistance  Transfer Comments: Verbal/tactile cues for hand placement with fair return.   Cognition  Overall Cognitive Status: Exceptions  Arousal/Alertness: Delayed responses to stimuli;Inconsistent responses to stimuli  Following Commands: Inconsistently follows commands  Attention Span: Difficulty attending to directions  Memory: Decreased recall of recent events  Safety Judgement: Decreased awareness of need for assistance;Decreased awareness of need for safety  Problem Solving: Assistance required to identify errors made;Assistance required to correct errors made  Insights: Decreased awareness of deficits  Initiation: Requires cues for all  Sequencing: Requires cues for all     Plan   Plan  Times per week: 3-5 x/wk  Current Treatment Recommendations: Strengthening, Balance Training, Functional Mobility Training, Endurance Training, Cognitive Reorientation, Pain Management, Safety Education & Training, Patient/Caregiver Education & Training, Equipment Evaluation, Education, & procurement, Self-Care / ADL  [facility]        [facility] Inpatient Daily Activity Raw Score: 12 (07/27/21 1348)  -PAC Inpatient ADL T-Scale Score : 30.6 (07/27/21 1348)  ADL Inpatient CMS 0-100% Score: 66.57 (07/27/21 1348)  ADL Inpatient CMS G-Code Modifier : CL (07/27/21 1348)    Goals  Short term goals  Time Frame for Short term goals: By discharge, pt will:  Short term goal 1: Demo Mod I for bed mobility to increase independence with ADLs and decrease risk for pressure injury  Short term goal 2: Demo Mod I with setup and increased time PRN for UB ADLs and grooming tasks  Short term goal 3: Demo CGA with setup and AE PRN for LB ADLs and toileting tasks  Short term goal 4: Demo 8+ minutes dynamic standing and reaching task to increase balance for safety and independence with ADLs/IADLs  Short term goal 5: Demo SBA for functional transfers and functional mobility with use of LRD PRN  Short term goal 6: Follow 3-step functional task to increase cognition for participation in ADLs/IADLs       Therapy Time   Individual Concurrent Group Co-treatment   Time In 1105         Time Out 1134         Minutes 29         Timed Code Treatment Minutes: 29 Minutes     Pt supine in bed upon therapist arrival. Pt agreeable to therapy with encouragement. See above for LOF for all tasks. Pt retired to seated in chair at end of session with chair alarm activated and call light within reach.     TIM Patton/TIANNA

## 2021-07-27 NOTE — CONSULTS
[facility] -   Infectious diseases evaluation  admission date 7/21/2021    reason for consultation:   Diverticulosis/ leukocytosis and rash    Impression :   Current:  · bandemia  · Acute diverticulitis or L colitis  · Lingular infiltrate  · 7/21/21 new Skin rash on the back  · CRP elevation  · anxiety    Other:  · Copd w prednisone  · Hx astrocytoma age 24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 recurrent -  · SZ on tx  · Osteoporosis  · Migraine headache   · Chronic pancreatitis  Discussion / summary of stay / plan of care   ·   Recommendations   · Keep zosyn for now -7/21 -7/30  · follow CRP to better assess the response to AB since the leukocytosis is related to the steroids  · Rash seems like livedo reticularis and is a fluctuating in intensity -not relevant at this time  · Will benefit from CTAP repeat look for perforation due to the persistent LLQ pain and tenderness - will disc w medicine    Infection Control Recommendations   · [place] Precautions    Antimicrobial Stewardship Recommendations   · Simplification of therapy  · Targeted therapy    Coordination ofOutpatient Care:   · Estimated Length of IV antimicrobials:  · Patient will need Midline / picc Catheter Insertion:   · Patient will need SNF:  · Patient will need outpatient wound care:     History of Present Illness:   Initial history:  Yuliet Beverly is a 45y.o.-year-old female w abd  And back pain and hospital and found T spine cp fracture acute and sub acute, no sx plans,   Found w lingular infiltrate and started on zosyn, for presumed aspiration pneumonia. Also on CTAP left diverticulitis and no abscess or perforation seen, but surround left colitis seen as well. Started on zosyn since 7/21 and eating and feels better,m still a little tired and sleepy but eating. leukocytosis still elevated while getting steroids for COPD.   Rash on the back noticed since prior to admission, unclear cause-    ID called for the leukocytosis and rash. On off - unclear if present x long time PTA since she lives by herself and the rash is only on the back and post right arm. She had fevers at admission and is down to LGF since    On exam she is very anxious and ox 3 - easily emotional              Interval changes  7/27/2021   Patient Vitals for the past 8 hrs:   BP Temp Temp src Pulse Resp SpO2   07/27/21 1202 -- -- -- -- 26 92 %   07/27/21 0820 -- -- -- -- 20 98 %   07/27/21 0801 -- -- -- 92 30 100 %   07/27/21 0800 (!) 133/97 98.6 °F (37 °C) Oral 98 (!) 32 93 %   07/27/21 0759 -- -- -- 85 20 99 %   07/27/21 0758 -- -- -- 78 13 --   07/27/21 0436 -- -- -- -- 14 --       Summary of relevant labs:  Labs:  W 19 - 17 - 19  Creat  0.68    Results for Shen Sanford (MRN [id number]) as of 7/27/2021 12:12   Ref. Range 7/22/2021 04:55 7/26/2021 06:23   CRP Latest Ref Range: 0.0 - 5.0 mg/L 326.9 (H) 269.3 (H)       Micro:    Imaging:  CT head 7/25  No acute intracranial abnormality. Suboccipital craniectomy. CT AP 7/21   Probable acute diverticulitis or colitis left colon. No evidence of perforation or abscess at this time. Chronic pancreatitis. Multiple compression fractures some of which are new since the previous exam.   Lingular ground-glass infiltrate. Recommend follow-up to resolution. Intra-atrial lipoma right atrium. Cardiac echo may be helpful. I have personally reviewed the past medical history, past surgical history, medications, social history, and family history, and I haveupdated the database accordingly. Allergies:   Patient has no known allergies. Review of Systems:     Review of Systems   Constitutional: Negative for activity change and appetite change. HENT: Negative for congestion. Eyes: Negative for discharge. Respiratory: Negative for apnea. Cardiovascular: Negative for chest pain. Gastrointestinal: Positive for abdominal pain. Negative for abdominal distention.    Endocrine: Negative for heat intolerance. Genitourinary: Negative for dysuria. Musculoskeletal: Negative for arthralgias. Skin: Positive for rash. Negative for color change. Allergic/Immunologic: Negative for immunocompromised state. Neurological: Positive for tremors. Negative for dizziness. Hematological: Negative for adenopathy. Psychiatric/Behavioral: Positive for agitation. The patient is nervous/anxious. Physical Examination :       Physical Exam  Constitutional:       General: She is not in acute distress. Appearance: Normal appearance. She is not ill-appearing. HENT:      Head: Normocephalic and atraumatic. Nose: Nose normal.      Mouth/Throat:      Mouth: Mucous membranes are moist.   Eyes:      General: No scleral icterus. Pupils: Pupils are equal, round, and reactive to light. Cardiovascular:      Rate and Rhythm: Normal rate and regular rhythm. Heart sounds: Normal heart sounds. No murmur heard. Pulmonary:      Effort: No respiratory distress. Breath sounds: Normal breath sounds. Abdominal:      General: There is no distension. Palpations: Abdomen is soft. Tenderness: There is abdominal tenderness. Right CVA tenderness: left lower quadrant. Genitourinary:     Comments: No helms  Musculoskeletal:         General: No swelling or deformity. Cervical back: Neck supple. No rigidity. Skin:     General: Skin is dry. Coloration: Skin is not jaundiced. Neurological:      General: No focal deficit present. Mental Status: She is alert and oriented to person, place, and time. Cranial Nerves: No cranial nerve deficit. Sensory: No sensory deficit. Comments: In tears - very anxious   Psychiatric:         Mood and Affect: Mood normal.         Thought Content:  Thought content normal.         Past Medical History:     Past Medical History:   Diagnosis Date    Acute respiratory failure with hypoxia ([facility] 10/16/2020    Alcohol withdrawal syndrome, with delirium ([facility] 12/14/2019    Alcoholism ([facility]     Anemia 10/2020    GI bleed    Astrocytoma ([facility] - 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24, the patient underwent 2 surgical resections without known recurrence 10/23/2020    Closed fracture of lateral portion of left tibial plateau 65/87/7156    COPD (chronic obstructive pulmonary disease) ([facility]     CO2 retainer, on Bipap at night for this, Dr. Lana Ribeiro ( last visit 11/20/2020 and note on chart )    Depression     bipolar, major depressive disorder, ptsd, anxiety    Dysphagia     GI bleed 10/2020    Hypertension     Memory loss     Oxygen dependent     pt stated not needed as of 12/9/2020    Pain, joint, ankle and foot     Pancreatic lesion 10/2020    Dr. Abilio Stapleton working up pt    Peripheral neuropathy     Seizures ([facility]     also baseline tremors-last sz summer 2020    Tension headache     Under care of team 07/01/2021    neuro-Dr Wan- alxeander-last visit june 2021    Under care of team 07/01/2021    pain management-Hamzah jimenez-last visit june 2021    Under care of team 07/01/2021    pulmonology-Dr Due[facility]as-[facility]-last virtual visit feb 2021    Under care of team 07/01/2021    psych-bahnfeldt NP-telemed-last visit may 2021    Under care of team 07/01/2021    ma[address]-last visit june 2021    Wellness examination 07/01/2021    pcp-Ludivina grimes-last visit may 2021       Past Surgical  History:     Past Surgical History:   Procedure Laterality Date   [address]    astrocytoma times 2    COLONOSCOPY N/A 10/22/2020    COLONOSCOPY DIAGNOSTIC performed by Rafaela Castillo MD at [address] (LOWER) N/A 12/9/2020    ENDOSCOPIC ULTRASOUND, UPPER WITH LINEAR SCOPE FOR BIOPSY OF MASS ON HEAD OF PANCREAS performed by Verena Laurent MD at [address] Left 7-3-13    ORIF tibial plateau    FRACTURE SURGERY Right     small finger metacarpal fracture    HAND SURGERY      pins    HYSTERECTOMY  2003    UPPER GASTROINTESTINAL ENDOSCOPY N/A 10/22/2020    EGD BIOPSY performed by Ann Fritz MD at [address] [place] [address] N/A 4/5/2021    EGD BIOPSY performed by Ann Fritz MD at \[facility]\"" Endoscopy       Medications:      predniSONE  40 mg Oral Daily    Followed by   Leafy Eves ON 7/30/2021] predniSONE  30 mg Oral Daily    Followed by   Leafy Eves ON 8/2/2021] predniSONE  20 mg Oral Daily    Followed by   Leafy Eves ON 8/5/2021] predniSONE  10 mg Oral Daily    magnesium sulfate  2,000 mg Intravenous Once    spironolactone  50 mg Oral Daily    ipratropium-albuterol  1 ampule Inhalation 4x daily    polyethylene glycol  17 g Oral BID    carBAMazepine  200 mg Oral TID    topiramate  50 mg Oral BID    amLODIPine  5 mg Oral Daily    baclofen  10 mg Oral TID    budesonide-formoterol  2 puff Inhalation BID    busPIRone  15 mg Oral TID    ferrous sulfate  325 mg Oral BID    folic acid  1 mg Oral Daily    potassium chloride  20 mEq Oral Daily with breakfast    pregabalin  200 mg Oral TID    rOPINIRole  1 mg Oral Nightly    sodium chloride flush  5-40 mL Intravenous 2 times per day    enoxaparin  40 mg Subcutaneous Daily    piperacillin-tazobactam  3,375 mg Intravenous Q8H       Social History:     Social History     Socioeconomic History    Marital status: Single     Spouse name: Not on file    Number of children: Not on file    Years of education: Not on file    Highest education level: Not on file   Occupational History    Not on file   Tobacco Use    Smoking status: Current Every Day Smoker     Packs/day: 0.50     Years: 30.00     Pack years: 15.00     Types: Cigarettes    Smokeless tobacco: Never Used    Tobacco comment: plans on quitting in the future    Vaping Use    Vaping Use: Never used   Substance and Sexual Activity    Alcohol use: Not Currently     Alcohol/week: 0.0 standard drinks    Drug use:  Yes Frequency: 2.0 times per week     Types: Marijuana    Sexual activity: Not on file   Other Topics Concern    Not on file   Social History Narrative    Not on file     Social Determinants of Health     Financial Resource Strain: Medium Risk    Difficulty of Paying Living Expenses: Somewhat hard   Food Insecurity: No Food Insecurity    Worried About Running Out of Food in the Last Year: Never true    [place] of Food in the Last Year: Never true   Transportation Needs:     Lack of Transportation (Medical):  Lack of Transportation (Non-Medical):    Physical Activity:     Days of Exercise per Week:     Minutes of Exercise per Session:    Stress:     Feeling of Stress :    Social Connections:     Frequency of Communication with Friends and Family:     Frequency of Social Gatherings with Friends and Family:     Attends L[employer] Services:     Active Member of Clubs or Organizations:     Attends Club or Organization Meetings:     Marital Status:    Intimate Partner Violence:     Fear of Current or Ex-Partner:     Emotionally Abused:     Physically Abused:     Sexually Abused:        Family History:     Family History   Problem Relation Age of Onset    Other Father     Cancer Maternal Grandmother     Heart Disease Paternal Grandmother     Esophageal Cancer Maternal Aunt       Medical Decision Making:   I have independently reviewed/ordered the following labs:    CBC with Differential:   Recent Labs     07/26/21  0623 07/27/21  0511   WBC 19.3* 19.4*   HGB 11.5* 11.9   HCT 35.5* 37.9    391   LYMPHOPCT 8* 6*   MONOPCT 7 7     BMP:  Recent Labs     07/26/21  0623 07/27/21  0511    141   K 3.5* 3.2*   CL 96* 99   CO2 27 26   BUN 18 18   CREATININE 0.80 0.68   MG  --  1.6     Hepatic Function Panel: No results for input(s): PROT, LABALBU, BILIDIR, IBILI, BILITOT, ALKPHOS, ALT, AST in the last 72 hours. No results for input(s): RPR in the last 72 hours.   No results for input(s): HIV in the last 72 hours. No results for input(s): BC in the last 72 hours. Lab Results   Component Value Date    CREATININE 0.68 07/27/2021    GLUCOSE 160 07/27/2021    GLUCOSE 92 04/30/2012       Detailed results: Thank you for allowing us to participate in the care of this patient. Please call with questions. This note is created with the assistance of a speech recognition program.  While intending to generate adocument that actually reflects the content of the visit, the document can still have some errors including those of syntax and sound a like substitutions which may escape proof reading. It such instances, actual meaningcan be extrapolated by contextual diversion.     Domo Wang MD  Office: [phone]  Perfect serve / office [phone]

## 2021-07-27 NOTE — PROGRESS NOTES
[facility]  Office: [address], DO, Bharati Yeboah, DO, Nickie Redd, DO, Mc Rosales Sal, DO, Juan Carlos Weinberg MD, Karen Baum MD, Deny Walter MD, Werner Fonseca MD, Mckenzie Blandon MD, Mayito Leija MD, Mirella Trinidad MD, Jennifer Carrillo, DO, Jayden Corbin MD, Jorge L Almaguer DO, Kaveh Cruz MD,  Mabel Natarajan DO, Nayeli Araujo MD, Alfredo Palacio MD, Franklyn Pino MD, Princess Dietz MD, Lashanda Pope MD, Lalitha Peter MD, [facility], [facility], [facility], CNP, Sander Miramontes, CNP, Heena Rosas, CNS, Rebecca Cleary, CNP, Destiny Boyd, CNP, Ike Menezes, CNP, Karan Granados, CNP, Jagruti Lovell, CNP, Frantz Raman PA-C, Jose Adair, [facility], Saira Castellanos, CNP, Missy Torres, CNP, Derek Henley, CNP, Martina Villalta, CNP, Erick Koroma, CNP, Jaron Bennett, CNP, Faviola Cheema, CNP, Stacy Smith, [address] [place] [address]    Progress Note    7/27/2021    8:09 AM    Name:   Yuliet Beverly  MRN:     [id number]     Acct:      [de-identified]   Room:   2022/2022-01   Day:  6  Admit Date:  7/21/2021  3:06 PM    PCP:   Brian Hudson MD  Code Status:  Full Code    Subjective:     C/C:   Chief Complaint   Patient presents with    Back Pain     states chronic back pain becoming worse.  has recently started physical therapy     Interval History Status: not changed. Pt seen and evaluated this morning. She is in the bedside chair. She is quite delirious. She arouses to her name. She repeats my questions back to me without providing an answer. She follows simple commands. Her mother is at the bedside who is helpful in providing a history. Brief History:      This is a 50-year-old female with underlying history of hypertension, COPD, anxiety/depression, UTI, thoracic compression fractures, peripheral neuropathy, migraine headaches, seizure disorder, and alcoholism (last drink 6 months ago) who presents the emergency department with generalized malaise, headache and abdominal/back pain.    Initial blood work revealed leukocytosis, elevated alkaline phosphatase, she was tachycardic and hypoxic as well, imaging revealed picture of acute diverticulitis/colitis of left colon without peripheral abscess along with chronic pancreatitis, CT spine revealed stable T7-T9 and T11 compression fracture, worsening of compression fracture of T6 and new T5 and superior L5 subacute fracture. Admitted to the hospital for sepsis related to diverticulitis. Started on zosyn. Neurosurgery consulted regarding compression fractures, no surgical intervention inpatient. Pt condition continued to worsen. Chest x-ray suggestive of pneumonia. Pulmonology consulted. Hi flow nasal cannula initiated in conjunction with steroids. ID consulted for abx recommendations. Review of Systems:     Constitutional:  negative for chills, fevers, sweats  Respiratory:  + cough, dyspnea on exertion, shortness of breath  Cardiovascular:  negative for chest pain, chest pressure/discomfort, lower extremity edema, palpitations  Gastrointestinal:  + for abdominal pain. Negative for constipation, diarrhea, nausea, vomiting  Neurological:  negative for dizziness, headache    Medications:      Allergies:  No Known Allergies    Current Meds:   Scheduled Meds:    spironolactone  50 mg Oral Daily    ipratropium-albuterol  1 ampule Inhalation 4x daily    polyethylene glycol  17 g Oral BID    lactulose  20 g Oral TID    carBAMazepine  200 mg Oral TID    methylPREDNISolone  40 mg Intravenous Q12H    topiramate  50 mg Oral BID    amLODIPine  5 mg Oral Daily    baclofen  10 mg Oral TID    budesonide-formoterol  2 puff Inhalation BID    busPIRone  15 mg Oral TID    ferrous sulfate  325 mg Oral BID    folic acid  1 mg Oral Daily    potassium chloride  20 mEq Oral Daily with breakfast    pregabalin  200 mg Oral TID    rOPINIRole  1 mg Oral Nightly    sodium chloride flush 5-40 mL Intravenous 2 times per day    enoxaparin  40 mg Subcutaneous Daily    piperacillin-tazobactam  3,375 mg Intravenous Q8H     Continuous Infusions:    sodium chloride 25 mL (21 1803)     PRN Meds: LORazepam, hyoscyamine, hydrOXYzine, [Held by provider] fentanNYL, bisacodyl, albuterol, traZODone, sodium chloride flush, sodium chloride, acetaminophen **OR** acetaminophen, [Held by provider] oxyCODONE-acetaminophen **OR** [DISCONTINUED] oxyCODONE-acetaminophen, melatonin    Data:     Past Medical History:   has a past medical history of Acute respiratory failure with hypoxia ([facility] Alcohol withdrawal syndrome, with delirium ([address] Alcoholism ([address] Anemia, Astrocytoma ([facility] [address] - 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 Social History:   reports that she has been smoking cigarettes. She has a 15.00 pack-year smoking history. She has never used smokeless tobacco. She reports previous alcohol use. She reports current drug use. Frequency: 2.00 times per week. Drug: Marijuana.      Family History:   Family History   Problem Relation Age of Onset    Other Father     Cancer Maternal Grandmother     Heart Disease Paternal Grandmother     Esophageal Cancer Maternal Aunt        Vitals:  /83   Pulse 86   Temp 97.5 °F (36.4 °C) (Oral)   Resp 14   Ht 5' 5\" (1.651 m)   Wt 143 lb 4.8 oz (65 kg)   SpO2 99%   BMI 23.85 kg/m²   Temp (24hrs), Av.3 °F (36.3 °C), Min:96.8 °F (36 °C), Max:97.5 °F (36.4 °C)    No results for input(s): POCGLU in the last 72 hours. I/O (24Hr): Intake/Output Summary (Last 24 hours) at 7/27/2021 0809  Last data filed at 7/27/2021 0701  Gross per 24 hour   Intake 858 ml   Output 550 ml   Net 308 ml       Labs:  Hematology:  Recent Labs     07/25/21  0701 07/26/21 0623 07/27/21  0511   WBC 17.7* 19.3* 19.4*   RBC 4.12 3.90* 4.06   HGB 12.2 11.5* 11.9   HCT 37.6 35.5* 37.9   MCV 91.3 91.0 93.3   MCH 29.6 29.5 29.3   MCHC 32.4 32.4 31.4   RDW 13.5 13.5 13.3    391 391   MPV 10.0 10.0 10.2   CRP  --  269.3*  --      Chemistry:  Recent Labs     07/25/21  0701 07/26/21  0623 07/27/21  0511    139 141   K 3.8 3.5* 3.2*   CL 98 96* 99   CO2 28 27 26   GLUCOSE 117* 114* 160*   BUN 18 18 18   CREATININE 0.75 0.80 0.68   ANIONGAP 15 16 16   LABGLOM >60 >60 >60   GFRAA >60 >60 >60   CALCIUM 9.1 8.4* 8.7     Recent Labs     07/25/21  1122   AMMONIA 23     ABG:  Lab Results   Component Value Date    POCPH 7.467 07/26/2021    POCPCO2 44.7 07/26/2021    POCPO2 71.9 07/26/2021    POCHCO3 32.3 07/26/2021    NBEA NOT REPORTED 07/26/2021    PBEA 8 07/26/2021    JOI5BVR NOT REPORTED 07/26/2021    YUXW8RWG 95 07/26/2021    FIO2 100.0 07/26/2021     Lab Results   Component Value Date/Time    SPECIAL  R FA 18ML 07/24/2021 10:35 PM     Lab Results   Component Value Date/Time    CULTURE NO GROWTH 3 DAYS 07/24/2021 10:35 PM       Radiology:  XR THORACIC SPINE (3 VIEWS)    Result Date: 7/23/2021  Lumbar spine: Superior endplate fracture L5 which appears acute to subacute. No subluxation. Other vertebra well maintained. Spine significant compression deformity T6 with there are endplate loss in height T8, T9 and T10. No subluxation. Mild levo scoliotic curvature. Osteopenia complicates assessment. Pedicles are intact. Low lung volumes complicate assessment. There is suggestion of pleural effusions. XR LUMBAR SPINE (2-3 VIEWS)    Result Date: 7/23/2021  Lumbar spine: Superior endplate fracture L5 which appears acute to subacute. No subluxation. Other vertebra well maintained. Spine significant compression deformity T6 with there are endplate loss in height T8, T9 and T10. No subluxation. Mild levo scoliotic curvature. Osteopenia complicates assessment. Pedicles are intact. Low lung volumes complicate assessment. There is suggestion of pleural effusions. XR ABDOMEN (KUB) (SINGLE AP VIEW)    Result Date: 7/22/2021  Mild dilation of the right hemicolon and patchy small bowel gas most likely due to ileus. XR ACUTE ABD SERIES CHEST 1 VW    Result Date: 7/24/2021  Bilateral airspace disease and pleural effusions. Findings in the upper lobes appear increased compared to prior, left greater than right. Findings may be related to asymmetric edema with superimposed pneumonia not excluded. Gas and stool are seen throughout nondilated bowel loops with few nonspecific air-fluid levels in the right lower abdomen, likely in small bowel. Findings may be physiologic or related to mild ileus. No evidence of obstruction or free air. CT HEAD WO CONTRAST    Result Date: 7/25/2021  No acute intracranial abnormality. Suboccipital craniectomy. MRI THORACIC SPINE WO CONTRAST    Result Date: 7/23/2021  1. Limited examination. 2. Compression deformities involving T6, T8, T9 and T11. These are likely chronic in nature. Diffusely decreased T1 and T2 marrow signal within the T6 vertebral body compatible with the sclerosis seen on the prior CT. 3. There is minimal bone marrow edema involving the left lateral elements of T6 and T7. Unclear whether this is degenerative in nature or perhaps sequelae of recent or remote trauma. 4. No significant spinal canal stenosis of the thoracic spine. 5. Moderate bilateral neural foraminal narrowing at T6-T7. 6. Bilateral pleural effusions, left greater than right. MRI LUMBAR SPINE WO CONTRAST    Result Date: 7/23/2021  1.  Acute compression deformity of the superior endplate of L5 with approximately 35% loss of height. No significant bulging of the posterior cortex. 2. No significant spinal canal stenosis of the lumbar spine. 3. Neural foraminal narrowing at L3-L4 through L5-S1. 4. Minimal retrolisthesis at L5-S1. XR CHEST PORTABLE    Result Date: 7/25/2021  1. Increasing vascular congestion and left perihilar opacity. 2.  Pleural effusions and basilar opacities are otherwise without significant change. XR CHEST PORTABLE    Result Date: 7/23/2021  Interval development of bibasilar infiltrates and small pleural effusions which could represent dependent edema, pneumonia or aspiration. XR CHEST PORTABLE    Result Date: 7/22/2021  No radiologic evidence of acute cardiopulmonary disease. CT CHEST ABDOMEN PELVIS W CONTRAST    Result Date: 7/21/2021  Probable acute diverticulitis or colitis left colon. No evidence of perforation or abscess at this time. Chronic pancreatitis. Multiple compression fractures some of which are new since the previous exam. Lingular ground-glass infiltrate. Recommend follow-up to resolution. Intra-atrial lipoma right atrium. Cardiac echo may be helpful. CT LUMBAR SPINE TRAUMA RECONSTRUCTION    Result Date: 7/21/2021  CT thoracic spine: 1. Decrease in height in T6 compared to prior study compatible with worsening compression with subacute etiology and mild protrusion of the dorsal aspect of T6. Narrowed T6-T7 neural foramina. 2.  Chronic superior height T8, T9, and T11 without interval change from prior study. 3.  Mild prevertebral soft tissue prominence T6 without evidence of abscess formation. CT lumbar spine: 1. No traumatic malalignment. 2.  Compression fracture superior L5 with subacute appearance. 3.  Calcifications in the abdomen incompletely included appearance suggesting pancreatic calcifications. RECOMMENDATIONS: Please reference CT chest, abdomen and pelvis of same day.      CT THORACIC SPINE TRAUMA RECONSTRUCTION    Result Date: 7/21/2021  CT thoracic spine: 1. Decrease in height in T6 compared to prior study compatible with worsening compression with subacute etiology and mild protrusion of the dorsal aspect of T6. Narrowed T6-T7 neural foramina. 2.  Chronic superior height T8, T9, and T11 without interval change from prior study. 3.  Mild prevertebral soft tissue prominence T6 without evidence of abscess formation. CT lumbar spine: 1. No traumatic malalignment. 2.  Compression fracture superior L5 with subacute appearance. 3.  Calcifications in the abdomen incompletely included appearance suggesting pancreatic calcifications. RECOMMENDATIONS: Please reference CT chest, abdomen and pelvis of same day. Physical Examination:        General appearance:  Somnolent, acutely ill appearing. Does not appear to be in distress currently  Mental Status: oriented to self and place but not time  Lungs:  Rhonchi bilaterally. Coarse respirations throughout  Heart:  regular rate and rhythm, no murmur  Abdomen:  soft, nondistended, normal bowel sounds, no masses, hepatomegaly, splenomegaly.  There is tenderness in LLQ  Extremities:  no edema, redness, tenderness in the calves  Skin:  Diffuse mottled appearing rash covering back consistent with livedo reticularis    Assessment:        Hospital Problems         Last Modified POA    * (Principal) Sepsis ([address] 7/22/2021 Yes    Essential hypertension 7/21/2021 Yes    Tobacco use 7/21/2021 Yes    Seizure disorder ([address] 7/25/2021 Yes    COPD with acute exacerbation ([address] 7/22/2021 Yes    Acute respiratory failure with hypoxia ([address] 7/22/2021 Yes    Recurrent major depressive disorder ([address] 7/21/2021 Yes    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24, the patient underwent 2 surgical resections without known recurrence 8/71/5070 Yes    Metabolic encephalopathy 6/67/9689 Yes    AMAN (generalized anxiety disorder) 7/21/2021 Yes    Chronic peripheral neuropathic pain 7/21/2021 Yes    History of alcohol abuse 7/21/2021 Yes    Personal history of astrocytoma 7/21/2021 Yes    Marijuana abuse 7/21/2021 Yes    Closed fracture of fifth thoracic vertebra ([address] 7/22/2021 Yes    Diverticulitis of large intestine without perforation or abscess without bleeding 7/22/2021 Yes    Elevated brain natriuretic peptide (BNP) level 7/22/2021 Yes    Elevated alkaline phosphatase level 7/22/2021 Yes    Chronic pancreatitis ([address] 7/22/2021 Yes    Erythema ab igne 7/22/2021 Yes    Compression fracture of thoracic vertebra ([address] 7/23/2021 Yes    Compression of lumbar vertebra ([address] 7/23/2021 Yes          Plan:        #Sepsis: Likely secondary to colitis/diverticulitis. Continue zosyn. Consultation to ID as the patient is on day 7 on abx and not showing much improvement. #Metabolic encephalopathy secondary to sepsis     #New fracture at T5 and L5,Chronic compression fracture in the thoracic vertebrae, there is worsening and compression fracture at T6 : Neurosurgery with no plans for surgical intervention inpatient. Needs OP DEXA scan and f/u OP for consideration of kyphoplasty.      #Acute hypoxemic respiratory failure: secondary to decompensated COPD and pneumonia. Pulmonology following. Remains on zosyn. Transitioned to oral prednisone today.     #Seizure disorder: Follows up with neurology as an outpatient, continue antiepileptic drugs as ordered. Neurology following.  EEG ordered.      #HTN: continue amlodipine, aldactone as BP tolerates     #HPL: continue home medications     #DVT prophylaxis     Otilia Gibson PA-C  7/27/2021  8:09 AM

## 2021-07-27 NOTE — CARE COORDINATION
Transition notes   Spoke with Guero Hyatt from \[facility] [place] [facility]\"" - [facility] told her it is ok to start pre -cert

## 2021-07-27 NOTE — PROGRESS NOTES
Neurology Nurse Practitioner Progress Note      INTERVAL HISTORY: This is a 45 y.o.  female admitted 7/21/2021 for back pain and lethargy. This is a follow-up neurology progress note. The patient was examined and the chart was reviewed. Discussed with the RN. Patient was alert, seemed uncomfortable, moaning, knew she was at [place] in [facility]; when asked about the state, she kept repeating [facility], unable to be redirected. Intermittent repetitive speech and crying. Patient has had received several doses of Ativan in the last few days resulting in somnolence. Mother was at the bedside, who helped with HPI. Her questions were answered. HPI: Pam Duncan is a 45 y.o. female with H/O HTN, COPD, astrocytoma resection x 2 (1989), long-term seizure disorder, migraine headaches, lumbar radiculopathy, recurrent UTIs, alcoholism, chronic pancreatitis, who was admitted 7/21/2021 for abdominal and back pain, generalized unwell feeling and lethargy. As per medical records, patient presented to ED with complaint of abdominal pain, worsening severe lower back pain, headache, generalized weakness and fatigue; all the symptoms started almost 2 to 3 days ago and have continued to worsen over time. Back pain was described as spasmodic and tightness; she does take baclofen for it. Patient denied any falls. Patient also reported increasing cough over the last 1 week with more phlegm. She reported poor appetite with limited oral intake; did not take any medications over this time. HOSPITAL COURSE:  At arrival, patient seemed uncomfortable, sitting slumped over in the wheelchair moaning, was hypertensive 134/106 mmHg, hypothermic with temp of 98.1 °F, tachycardic with heart rate of 105 and hypoxic, 93%. Leukocytosis of 21; he was started on vancomycin and Zosyn. Her urine was grossly purulent and malodorous. Patient was admitted under medicine service for further evaluation.   CT abdomen showed probable acute diverticulitis/colitis of left colon & chronic pancreatitis. During this hospitalization, patient has had intermittent episodes of hypoxia, requiring BiPAP placement. On the night of 7/23, patient had some seizure-like symptoms for around 15-30 seconds described as generalized body stiffness and jerking. Neurology was consulted on 7/24. As per mother patient has history of seizure disorder. She reported that as an infant patient got cyanotic and passed out; urgent tracheostomy was done; patient was placed on phenobarbital for a \"very long time\". At the age of 14, patient developed nocturnal urinary incontinence; she was diagnosed with petit mal seizures on sleep study. At the age of 24, patient developed severe headaches and had an episode of LOC; was diagnosed with astrocytoma and underwent resection x 2. She has been on Tegretol for quite some time and had been seizure-free. Mother reported that patient would develop upper arm jerking; usually patient is aware of her surroundings. Denied tongue bite or incontinence. Patient is a recovering alcoholic since 34/8652. However mother thinks patient might have drank on 7/11/2021; patient declined when mother confronted her. She is known to our service from her previous admission in 10/2020 due to seizure-like activity and DTs. Patient has history of migraine headaches for which she takes Topamax 50 mg twice daily; RLS - she takes Requip 1 mg daily. She is on Lyrica 200 mg 3 times daily for her peripheral neuropathy. Is on baclofen 10 mg 3 times daily for back pain. Takes BuSpar 15 mg 3 times daily for her depression/anxiety.      predniSONE  40 mg Oral Daily    Followed by   Russell Florez ON 7/30/2021] predniSONE  30 mg Oral Daily    Followed by   Russell Florez ON 8/2/2021] predniSONE  20 mg Oral Daily    Followed by   Russell Florez ON 8/5/2021] predniSONE  10 mg Oral Daily    magnesium sulfate  2,000 mg Intravenous Once    spironolactone  50 mg Oral Daily    ipratropium-albuterol  1 ampule Inhalation 4x daily    polyethylene glycol  17 g Oral BID    carBAMazepine  200 mg Oral TID    topiramate  50 mg Oral BID    amLODIPine  5 mg Oral Daily    baclofen  10 mg Oral TID    budesonide-formoterol  2 puff Inhalation BID    busPIRone  15 mg Oral TID    ferrous sulfate  325 mg Oral BID    folic acid  1 mg Oral Daily    potassium chloride  20 mEq Oral Daily with breakfast    pregabalin  200 mg Oral TID    rOPINIRole  1 mg Oral Nightly    sodium chloride flush  5-40 mL Intravenous 2 times per day    enoxaparin  40 mg Subcutaneous Daily    piperacillin-tazobactam  3,375 mg Intravenous Q8H       Past Medical History:   Diagnosis Date    Acute respiratory failure with hypoxia ([facility] 10/16/2020    Alcohol withdrawal syndrome, with delirium ([facility] 12/14/2019    Alcoholism ([facility]     Anemia 10/2020    GI bleed    Astrocytoma ([facility] - 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24, the patient underwent 2 surgical resections without known recurrence 10/23/2020    Closed fracture of lateral portion of left tibial plateau 78/53/3476    COPD (chronic obstructive pulmonary disease) ([facility]     CO2 retainer, on Bipap at night for this, Dr. Perez Feeling ( last visit 11/20/2020 and note on chart )    Depression     bipolar, major depressive disorder, ptsd, anxiety    Dysphagia     GI bleed 10/2020    Hypertension     Memory loss     Oxygen dependent     pt stated not needed as of 12/9/2020    Pain, joint, ankle and foot     Pancreatic lesion 10/2020    Dr. Austin Peraza working up pt    Peripheral neuropathy     Seizures ([facility]     also baseline tremors-last sz summer 2020    Tension headache     Under care of team 07/01/2021    neuro-Dr Wan[facility] [facility]-last visit june 2021    Under care of team 07/01/2021    pain management-Hamzah jimenez-last visit june 2021    Under care of team 07/01/2021    pulmonology-Dr Dueñas-[address] [facility]-last virtual visit feb 2021    Under care of team 07/01/2021    psych-bahnfeldt NP-telemed-last visit may 2021    Under care of team 07/01/2021    rv[address]-last visit june 2021    Wellness examination 07/01/2021    pcp-Ludivina [address]-last visit may 2021       Past Surgical History:   Procedure Laterality Date    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    COLONOSCOPY N/A 10/22/2020    COLONOSCOPY DIAGNOSTIC performed by Rowdy Sagastume MD at [place] [address] (LOWER) N/A 12/9/2020    ENDOSCOPIC ULTRASOUND, UPPER WITH LINEAR SCOPE FOR BIOPSY OF MASS ON HEAD OF PANCREAS performed by Gian Melchor MD at [address] Left 7-3-13    ORIF tibial plateau    FRACTURE SURGERY Right     small finger metacarpal fracture    HAND SURGERY      pins    HYSTERECTOMY  2003    UPPER GASTROINTESTINAL ENDOSCOPY N/A 10/22/2020    EGD BIOPSY performed by Rowdy Sagastume MD at [address] [place] [address] N/A 4/5/2021    EGD BIOPSY performed by Rowdy Sagastume MD at [facility]       PHYSICAL EXAM:      Blood pressure (!) 133/97, pulse 92, temperature 98.6 °F (37 °C), temperature source Oral, resp. rate 20, height 5' 5\" (1.651 m), weight 143 lb 4.8 oz (65 kg), SpO2 98 %.       Limited Neurological Examination:  Patient was alert, seemed uncomfortable, moaning   She knew that she was at [place] in [facility]  When asked about the state, she kept repeating \"[place]\", unable to be redirected  Intermittent repetitive speech, agitation & crying  No ptosis  Hearing intact  Face appears symmetrical  Moving all limbs purposefully; complain of back pain   Was unable to add anything to the history or fully participate in examination        DATA      Lab Results   Component Value Date    WBC 19.4 (H) 07/27/2021    HGB 11.9 07/27/2021    HCT 37.9 07/27/2021     07/27/2021    ALT 11 07/24/2021    AST 32 (H) 07/24/2021     07/27/2021    K 3.2 (L) 07/27/2021    CL 99 07/27/2021    AMMONIA 23 07/25/2021    CREATININE 0.68 07/27/2021    BUN 18 07/27/2021    CO2 26 07/27/2021    TSH 0.68 12/23/2020    INR 0.9 07/22/2021    KUODOEDL30 699 02/03/2021    FOLATE >20.0 02/03/2021    LABA1C 5.5 04/13/2021     No results found for: CHOL  No results found for: TRIG  Lab Results   Component Value Date    HDL 42 12/23/2020    HDL 40 (L) 06/18/2019     Lab Results   Component Value Date    LDLCHOLESTEROL 134 (H) 12/23/2020    LDLCHOLESTEROL 92 06/18/2019     Lab Results   Component Value Date    VLDL NOT REPORTED 12/23/2020    VLDL NOT REPORTED 06/18/2019     Lab Results   Component Value Date    CHOLHDLRATIO 4.8 12/23/2020    CHOLHDLRATIO 3.8 06/18/2019         DIAGNOSTIC DATA:  CT HEAD (7/25/2021): Suboccipital craniectomy    MRI L-SPINE (7/23/2021): Acute compression deformity of the superior endplate of L5 with approximately 35% loss of height    CAROTID DOPPLER (/2021):    ECHO (7/22/2021): EF 60-65%. Lipomatous atrial septum. No shunt seen by color Doppler    EEG (7/27/2021): IMPRESSION & PLAN: 45 y.o.  female admitted with  Toxic metabolic encephalopathy in the setting of diverticulitis, sepsis & intermittent hypoxia; patient stays alert, partially oriented, seems confused with repetitive speech, intermittent crying & agitation. Patient is on Zosyn & Deltasone 40 mg QD     Long-term history of seizure disorder; EEG - result awaited. Continue Tegretol 200 mg TID (previously was on Tegretol- mg BID); Ativan 1 mg IV for seizures > 3 minutes; seizure precautions    Back pain; H/O chronic L4-5 radiculopathy (EMG 6/2021); osteoporosis. MRI lumbar spine - acute compression deformity of L5 superior endplate. Is on baclofen 10 mg TID and lidocaine 4% patch    Peripheral neuropathy; is on Lyrica 200 mg TID     Migraine headaches; continue Topamax 50 mg BID    RLS; is on Requip 1 mg QD    Recovering alcoholic; chronic pancreatitis; been sober since 12/2020.  Mother reported that pt might have been drinking on 7/11/2021    Comorbid conditions - HTN, astrocytoma resection x 2 (1989), PTSD, depression/anxiety (is on BuSpar)     Continue PT/OT    Will follow    Please note that this note was generated using a voice recognition dictation software. Although every effort was made to ensure the accuracy of this automated transcription, some errors in transcription may have occurred.

## 2021-07-27 NOTE — PLAN OF CARE
Problem: RESPIRATORY  Intervention: Respiratory assessment  7/26/2021 2027 by Tobi Britt RCP  Note: BRONCHOSPASM/BRONCHOCONSTRICTION     [x]         IMPROVE AERATION/BREATH SOUNDS  [x]   ADMINISTER BRONCHODILATOR THERAPY AS APPROPRIATE  [x]   ASSESS BREATH SOUNDS  [x]   IMPLEMENT AEROSOL/MDI PROTOCOL  [x]   PATIENT EDUCATION AS NEEDED   PROVIDE ADEQUATE OXYGENATION WITH ACCEPTABLE SP02/ABG'S    [x]  IDENTIFY APPROPRIATE OXYGEN THERAPY  [x]   MONITOR SP02/ABG'S AS NEEDED   [x]   PATIENT EDUCATION AS NEEDED   NON INVASIVE VENTILATION  PROVIDE OPTIMAL VENTILATION/ACCEPTABLE SP02  IMPLEMENT NON INVASIVE VENTILATION PROTOCOL  ASSESSMENT SKIN INTEGRITY  PATIENT EDUCATION AS NEEDED  BIPAP AS NEEDED

## 2021-07-27 NOTE — PLAN OF CARE
Problem: RESPIRATORY  Intervention: Nebulizer management  Note: BRONCHOSPASM/BRONCHOCONSTRICTION     [x]         IMPROVE AERATION/BREATH SOUNDS  [x]   ADMINISTER BRONCHODILATOR THERAPY AS APPROPRIATE  [x]   ASSESS BREATH SOUNDS  [x]   IMPLEMENT AEROSOL/MDI PROTOCOL  [x]   PATIENT EDUCATION AS NEEDED     Intervention: Support non-invasive mechanical ventilation  Note: NON INVASIVE VENTILATION  PROVIDE OPTIMAL VENTILATION/ACCEPTABLE SP02  IMPLEMENT NON INVASIVE VENTILATION PROTOCOL  ASSESSMENT SKIN INTEGRITY  PATIENT EDUCATION AS NEEDED  BIPAP AS NEEDED   Intervention: Provide oxygen therapy  Note:   PROVIDE ADEQUATE OXYGENATION WITH ACCEPTABLE SP02/ABG'S    [x]  IDENTIFY APPROPRIATE OXYGEN THERAPY  [x]   MONITOR SP02/ABG'S AS NEEDED   [x]   PATIENT EDUCATION AS NEEDED     Intervention: Chest physiotherapy  Note:   MOBILIZE SECRETIONS    [x]   ASSESS BREATH SOUNDS  [x]   ASSESS SPUTUM PRODUCTION  [x]   COUGH AND DEEP BREATHING  [x]  IMPLEMENT SECRETION MANAGEMENT PROTOCOL  [x]   PATIENT EDUCATION AS NEEDED

## 2021-07-27 NOTE — PLAN OF CARE
Problem: RESPIRATORY  Intervention: Respiratory assessment  Note: LIZBET BOLAND, PPatient Assessment complete. Sepsis ([facility] [A41.9] . Vitals:    07/27/21 1344   BP: 119/87   Pulse: 103   Resp: (!) 34   Temp: 97.9 °F (36.6 °C)   SpO2: 94%   . Patients home meds are   Prior to Admission medications    Medication Sig Start Date End Date Taking? Authorizing Provider   acamprosate (CAMPRAL) 333 MG tablet Take 2 tablets by mouth 3 times daily 7/16/21 8/15/21  [facility] [place], APRN - NP   sertraline (ZOLOFT) 50 MG tablet Take 3 tablets by mouth daily for 7 days, THEN 2 tablets daily for 7 days, THEN 1 tablet daily for 7 days. 7/16/21 8/6/21  [facility]   FLUoxetine (PROZAC) 20 MG tablet Take 0.5 tablets by mouth daily for 7 days, THEN 1 tablet daily for 23 days. 7/16/21 8/15/21  [facility]   traZODone (DESYREL) 50 MG tablet TAKE 1 AND 1/2 TABLET BY MOUTH ONCE NIGHTLY AS NEEDED FOR SLEEP 7/16/21   [facility], APRN - JW   thermotabs (MEDI-LYTE) TABS tablet Take 1 tablet by mouth daily for 3 days 7/9/21 7/12/21  Gillian Fernandez APRN - NP   carBAMazepine (TEGRETOL XR) 200 MG extended release tablet Take 1 tablet by mouth 2 times daily 7/6/21   Jeninfer Demarco MD   topiramate (TOPAMAX) 25 MG tablet Take 2 tablets by mouth daily 7/6/21   Jennifer Demarco MD   pregabalin (LYRICA) 200 MG capsule Take 1 capsule by mouth 3 times daily for 90 days.  7/2/21 9/30/21  Jennifer Demarco MD   KLOR-CON M20 20 MEQ extended release tablet TAKE ONE TABLET BY MOUTH DAILY 6/24/21   Ludivina Hylton MD   amLODIPine (NORVASC) 5 MG tablet TAKE ONE TABLET BY MOUTH DAILY 6/3/21   Ludivina Hylton MD   busPIRone (BUSPAR) 15 MG tablet Take 1 tablet by mouth 3 times daily 5/5/21   Ludivina Hylton MD   baclofen (LIORESAL) 10 MG tablet Take 1 tablet by mouth 3 times daily 5/25/21   Ludivina Hylton MD   ibuprofen (ADVIL;MOTRIN) 600 MG tablet Take 1 tablet by mouth 3 times daily as needed for Pain 5/25/21   Ludivina Cody MD   sertraline (ZOLOFT) 100 MG tablet Take 2 tablets by mouth daily 5/17/21   Nadia Bernard APRN - NP   ferrous sulfate (IRON 325) 325 (65 Fe) MG tablet Take 1 tablet by mouth 2 times daily 4/22/21   Ludivina Cody MD   rOPINIRole (REQUIP) 1 MG tablet Take 1 tablet by mouth nightly 4/1/21   Ludivina Cody MD   budesonide-formoterol [facility]) 160-4.5 MCG/ACT AERO Inhale 2 puffs into the lungs 2 times daily 3/31/21   Ludivina Cody MD   hydrOXYzine (ATARAX) 50 MG tablet TAKE ONE TABLET BY MOUTH THREE TIMES A DAY 3/12/21   Ludivina Cody MD   tiotropium (SPIRIVA RESPIMAT) 2.5 MCG/ACT AERS inhaler Inhale 2 puffs into the lungs daily 2/26/21 7/8/21  Amanda Knutson MD   sodium chloride (ALTAMIST SPRAY) 0.65 % nasal spray 1 spray by Nasal route as needed for Congestion 12/28/20   Ludivina Cody MD   ACETAMINOPHEN EXTRA STRENGTH 500 MG tablet Take 500 mg by mouth 3 times daily as needed 5/8/20   Historical Provider, MD   albuterol sulfate HFA (VENTOLIN HFA) 108 (90 Base) MCG/ACT inhaler Inhale 2 puffs into the lungs 4 times daily as needed for Wheezing 6/11/20   Ludivina Cody MD   vitamin B-1 (THIAMINE) 100 MG tablet Take 1 tablet by mouth daily 7/12/19   Ludivina Cody MD   vitamin D (ERGOCALCIFEROL) 81522 units CAPS capsule Take 1 capsule by mouth once a week 6/19/19   Ludivina Cody MD   folic acid (FOLVITE) 1 MG tablet Take 1 tablet by mouth daily 6/19/19   Ludivina Cody MD   .  Assessment     Respiratory condition improving and somewhat in CXR. Able to wean off of HFNC to NC at 4 lpm with adequate saturations. Remains to have some congestion. Cough stronger today. Enephalophic, neuro consulted. Will continue as order. NIV at HS. Supplemental oxygen daytime. Dr. Ponciano Shone notes read today.        Breath Sounds: rhonchi        Secretion Management assessment at level  3        [x]  Secretion Management Assessment  Score 1 2 3   Bilateral Breath Sounds []  Occasional Rhonchi []  Scattered Rhonchi [x]  Course Rhonchi and/or poor aeration   Sputum    []  Small amount of thin secretions []  Moderate amount of viscous secretions []  Copius, Viscious Yellow/ Secretions   CXR as reported by physician []  clear  []  Unavailable []  Infiltrates and/or consolidation  []  Unavailable [x]  Mucus Plugging and or lobar consolidation  []  Unavailable   Cough []  Strong, productive cough []  Weak productive cough [x]  No cough or weak non-productive cough   LIZBET BOLAND RCP  4:50 PM                   Intervention: Nebulizer management  Note: BRONCHOSPASM/BRONCHOCONSTRICTION     [x]         IMPROVE AERATION/BREATH SOUNDS  [x]   ADMINISTER BRONCHODILATOR THERAPY AS APPROPRIATE  [x]   ASSESS BREATH SOUNDS  [x]   IMPLEMENT AEROSOL/MDI PROTOCOL  [x]   PATIENT EDUCATION AS NEEDED     Intervention: Support non-invasive mechanical ventilation  Note: NON INVASIVE VENTILATION  PROVIDE OPTIMAL VENTILATION/ACCEPTABLE SP02  IMPLEMENT NON INVASIVE VENTILATION PROTOCOL  ASSESSMENT SKIN INTEGRITY  PATIENT EDUCATION AS NEEDED  BIPAP AS NEEDED   Intervention: Provide oxygen therapy  Note:   PROVIDE ADEQUATE OXYGENATION WITH ACCEPTABLE SP02/ABG'S    [x]  IDENTIFY APPROPRIATE OXYGEN THERAPY  [x]   MONITOR SP02/ABG'S AS NEEDED   [x]   PATIENT EDUCATION AS NEEDED     Intervention: Chest physiotherapy  7/27/2021 1649 by Erinn Hernandez RCP  Note:   MOBILIZE SECRETIONS    [x]   ASSESS BREATH SOUNDS  [x]   ASSESS SPUTUM PRODUCTION  [x]   COUGH AND DEEP BREATHING  [x]  IMPLEMENT SECRETION MANAGEMENT PROTOCOL  [x]   PATIENT EDUCATION AS NEEDED     7/27/2021 1452 by Erinn Hernandez RCP  Note:   MOBILIZE SECRETIONS    [x]   ASSESS BREATH SOUNDS  [x]   ASSESS SPUTUM PRODUCTION  [x]   COUGH AND DEEP BREATHING  [x]  IMPLEMENT SECRETION MANAGEMENT PROTOCOL  [x]   PATIENT EDUCATION AS NEEDED

## 2021-07-28 ENCOUNTER — APPOINTMENT (OUTPATIENT)
Dept: ULTRASOUND IMAGING | Age: 51
DRG: 720 | End: 2021-07-28
Payer: MEDICARE

## 2021-07-28 ENCOUNTER — APPOINTMENT (OUTPATIENT)
Dept: CT IMAGING | Age: 51
DRG: 720 | End: 2021-07-28
Payer: MEDICARE

## 2021-07-28 ENCOUNTER — APPOINTMENT (OUTPATIENT)
Dept: INTERVENTIONAL RADIOLOGY/VASCULAR | Age: 51
DRG: 720 | End: 2021-07-28
Payer: MEDICARE

## 2021-07-28 PROBLEM — R65.20 SEVERE SEPSIS (HCC): Status: ACTIVE | Noted: 2021-07-21

## 2021-07-28 PROBLEM — E87.20 METABOLIC ACIDOSIS WITH INCREASED ANION GAP AND ACCUMULATION OF ORGANIC ACIDS: Status: ACTIVE | Noted: 2021-07-28

## 2021-07-28 PROBLEM — K57.20 DIVERTICULITIS OF LARGE INTESTINE WITH ABSCESS WITHOUT BLEEDING: Status: ACTIVE | Noted: 2021-07-22

## 2021-07-28 LAB
% FREE CARBAMAZEPINE: 22.8 % (ref 8–35)
-: ABNORMAL
ABSOLUTE EOS #: 0.17 K/UL (ref 0–0.4)
ABSOLUTE IMMATURE GRANULOCYTE: 0.34 K/UL (ref 0–0.3)
ABSOLUTE LYMPH #: 2.04 K/UL (ref 1–4.8)
ABSOLUTE MONO #: 1.7 K/UL (ref 0.1–0.8)
ALLEN TEST: ABNORMAL
AMORPHOUS: ABNORMAL
AMYLASE FLUID: 404 U/L
AMYLASE FLUID: 4100 U/L
AMYLASE: 105 U/L (ref 28–100)
ANION GAP SERPL CALCULATED.3IONS-SCNC: 19 MMOL/L (ref 9–17)
APPEARANCE FLUID: NORMAL
BACTERIA: ABNORMAL
BASO FLUID: NORMAL %
BASOPHILS # BLD: 0 % (ref 0–2)
BASOPHILS ABSOLUTE: 0 K/UL (ref 0–0.2)
BILIRUBIN URINE: NEGATIVE
BUN BLDV-MCNC: 11 MG/DL (ref 6–20)
BUN/CREAT BLD: ABNORMAL (ref 9–20)
CALCIUM SERPL-MCNC: 7.1 MG/DL (ref 8.6–10.4)
CARBAMAZEPINE, FREE: 2.3 UG/ML (ref 1–3)
CARBAMAZEPINE, TOTAL: 10.1 UG/ML (ref 4–12)
CARBOXYHEMOGLOBIN: 0.4 % (ref 0–5)
CASE NUMBER:: NORMAL
CASTS UA: ABNORMAL /LPF (ref 0–8)
CHLORIDE BLD-SCNC: 101 MMOL/L (ref 98–107)
CO2: 15 MMOL/L (ref 20–31)
COLOR FLUID: NORMAL
COLOR: YELLOW
CREAT SERPL-MCNC: 0.34 MG/DL (ref 0.5–0.9)
CRYSTALS, UA: ABNORMAL /HPF
CULTURE: NORMAL
DIFFERENTIAL TYPE: ABNORMAL
DIRECT EXAM: NORMAL
DIRECT EXAM: NORMAL
EOSINOPHIL FLUID: NORMAL %
EOSINOPHILS RELATIVE PERCENT: 1 % (ref 1–4)
EPITHELIAL CELLS UA: ABNORMAL /HPF (ref 0–5)
FIO2: ABNORMAL
FLUID DIFF COMMENT: NORMAL
GFR AFRICAN AMERICAN: >60 ML/MIN
GFR NON-AFRICAN AMERICAN: >60 ML/MIN
GFR SERPL CREATININE-BSD FRML MDRD: ABNORMAL ML/MIN/{1.73_M2}
GFR SERPL CREATININE-BSD FRML MDRD: ABNORMAL ML/MIN/{1.73_M2}
GLUCOSE BLD-MCNC: 200 MG/DL (ref 70–99)
GLUCOSE URINE: NEGATIVE
HCO3 VENOUS: 17.8 MMOL/L (ref 24–30)
HCT VFR BLD CALC: 27.9 % (ref 36.3–47.1)
HCT VFR BLD CALC: 31.2 % (ref 36.3–47.1)
HEMOGLOBIN: 8.5 G/DL (ref 11.9–15.1)
HEMOGLOBIN: 9.8 G/DL (ref 11.9–15.1)
IMMATURE GRANULOCYTES: 2 %
INR BLD: 1.1
INR BLD: 1.1
KETONES, URINE: NEGATIVE
LACTATE DEHYDROGENASE, FLUID: 2125 U/L
LACTATE DEHYDROGENASE, FLUID: 613 U/L
LACTIC ACID, WHOLE BLOOD: 1.2 MMOL/L (ref 0.7–2.1)
LACTIC ACID, WHOLE BLOOD: 7.7 MMOL/L (ref 0.7–2.1)
LACTIC ACID, WHOLE BLOOD: 8.7 MMOL/L (ref 0.7–2.1)
LEUKOCYTE ESTERASE, URINE: NEGATIVE
LIPASE: 101 U/L (ref 13–60)
LYMPHOCYTES # BLD: 12 % (ref 24–44)
LYMPHOCYTES, BODY FLUID: NORMAL %
Lab: NORMAL
MCH RBC QN AUTO: 29.4 PG (ref 25.2–33.5)
MCHC RBC AUTO-ENTMCNC: 30.5 G/DL (ref 28.4–34.8)
MCV RBC AUTO: 96.5 FL (ref 82.6–102.9)
METHEMOGLOBIN: ABNORMAL % (ref 0–1.5)
MODE: ABNORMAL
MONOCYTE, FLUID: NORMAL %
MONOCYTES # BLD: 10 % (ref 1–7)
MORPHOLOGY: NORMAL
MUCUS: ABNORMAL
NEGATIVE BASE EXCESS, VEN: 6.6 MMOL/L (ref 0–2)
NEUTROPHIL, FLUID: NORMAL %
NITRITE, URINE: NEGATIVE
NOTIFICATION TIME: ABNORMAL
NOTIFICATION: ABNORMAL
NRBC AUTOMATED: 0 PER 100 WBC
O2 DEVICE/FLOW/%: ABNORMAL
O2 SAT, VEN: 61.5 % (ref 60–85)
OTHER CELLS FLUID: NORMAL %
OTHER OBSERVATIONS UA: ABNORMAL
OXYHEMOGLOBIN: ABNORMAL % (ref 95–98)
PARTIAL THROMBOPLASTIN TIME: 27.6 SEC (ref 20.5–30.5)
PATIENT TEMP: 37
PCO2, VEN, TEMP ADJ: ABNORMAL MMHG (ref 39–55)
PCO2, VEN: 33.4 (ref 39–55)
PDW BLD-RTO: 13.3 % (ref 11.8–14.4)
PEEP/CPAP: ABNORMAL
PH FLUID: 7.5
PH UA: 8 (ref 5–8)
PH VENOUS: 7.35 (ref 7.32–7.42)
PH, VEN, TEMP ADJ: ABNORMAL (ref 7.32–7.42)
PLATELET # BLD: 307 K/UL (ref 138–453)
PLATELET ESTIMATE: ABNORMAL
PMV BLD AUTO: 10.2 FL (ref 8.1–13.5)
PO2, VEN, TEMP ADJ: ABNORMAL MMHG (ref 30–50)
PO2, VEN: 36.2 (ref 30–50)
POSITIVE BASE EXCESS, VEN: ABNORMAL MMOL/L (ref 0–2)
POTASSIUM SERPL-SCNC: 3.7 MMOL/L (ref 3.7–5.3)
PROCALCITONIN: 0.12 NG/ML
PROTEIN UA: NEGATIVE
PROTHROMBIN TIME: 11.9 SEC (ref 9.1–12.3)
PROTHROMBIN TIME: 11.9 SEC (ref 9.1–12.3)
PSV: ABNORMAL
PT. POSITION: ABNORMAL
RBC # BLD: 2.89 M/UL (ref 3.95–5.11)
RBC # BLD: ABNORMAL 10*6/UL
RBC FLUID: NORMAL /MM3
RBC UA: ABNORMAL /HPF (ref 0–4)
RENAL EPITHELIAL, UA: ABNORMAL /HPF
RESPIRATORY RATE: ABNORMAL
SAMPLE SITE: ABNORMAL
SEG NEUTROPHILS: 75 % (ref 36–66)
SEGMENTED NEUTROPHILS ABSOLUTE COUNT: 12.75 K/UL (ref 1.8–7.7)
SET RATE: ABNORMAL
SODIUM BLD-SCNC: 135 MMOL/L (ref 135–144)
SPECIFIC GRAVITY UA: 1.03 (ref 1–1.03)
SPECIMEN DESCRIPTION: NORMAL
SPECIMEN TYPE: NORMAL
TEXT FOR RESPIRATORY: ABNORMAL
TOTAL HB: ABNORMAL G/DL (ref 12–16)
TOTAL PROTEIN, BODY FLUID: 3.6 G/DL
TOTAL RATE: ABNORMAL
TRICHOMONAS: ABNORMAL
TURBIDITY: CLEAR
URINE HGB: NEGATIVE
UROBILINOGEN, URINE: NORMAL
VT: ABNORMAL
WBC # BLD: 17 K/UL (ref 3.5–11.3)
WBC # BLD: ABNORMAL 10*3/UL
WBC FLUID: 355 /MM3
WBC UA: ABNORMAL /HPF (ref 0–5)
YEAST: ABNORMAL

## 2021-07-28 PROCEDURE — 6370000000 HC RX 637 (ALT 250 FOR IP): Performed by: INTERNAL MEDICINE

## 2021-07-28 PROCEDURE — 94761 N-INVAS EAR/PLS OXIMETRY MLT: CPT

## 2021-07-28 PROCEDURE — 6370000000 HC RX 637 (ALT 250 FOR IP): Performed by: NURSE PRACTITIONER

## 2021-07-28 PROCEDURE — 85018 HEMOGLOBIN: CPT

## 2021-07-28 PROCEDURE — 36415 COLL VENOUS BLD VENIPUNCTURE: CPT

## 2021-07-28 PROCEDURE — 85025 COMPLETE CBC W/AUTO DIFF WBC: CPT

## 2021-07-28 PROCEDURE — 83986 ASSAY PH BODY FLUID NOS: CPT

## 2021-07-28 PROCEDURE — 81001 URINALYSIS AUTO W/SCOPE: CPT

## 2021-07-28 PROCEDURE — 94660 CPAP INITIATION&MGMT: CPT

## 2021-07-28 PROCEDURE — 85730 THROMBOPLASTIN TIME PARTIAL: CPT

## 2021-07-28 PROCEDURE — 88305 TISSUE EXAM BY PATHOLOGIST: CPT

## 2021-07-28 PROCEDURE — 99233 SBSQ HOSP IP/OBS HIGH 50: CPT | Performed by: INTERNAL MEDICINE

## 2021-07-28 PROCEDURE — 87075 CULTR BACTERIA EXCEPT BLOOD: CPT

## 2021-07-28 PROCEDURE — 82805 BLOOD GASES W/O2 SATURATION: CPT

## 2021-07-28 PROCEDURE — 83690 ASSAY OF LIPASE: CPT

## 2021-07-28 PROCEDURE — 2580000003 HC RX 258: Performed by: NURSE PRACTITIONER

## 2021-07-28 PROCEDURE — 80048 BASIC METABOLIC PNL TOTAL CA: CPT

## 2021-07-28 PROCEDURE — 6360000002 HC RX W HCPCS: Performed by: NURSE PRACTITIONER

## 2021-07-28 PROCEDURE — 2709999900 HC NON-CHARGEABLE SUPPLY

## 2021-07-28 PROCEDURE — 6360000002 HC RX W HCPCS: Performed by: PHYSICIAN ASSISTANT

## 2021-07-28 PROCEDURE — 84145 PROCALCITONIN (PCT): CPT

## 2021-07-28 PROCEDURE — 87070 CULTURE OTHR SPECIMN AEROBIC: CPT

## 2021-07-28 PROCEDURE — 94668 MNPJ CHEST WALL SBSQ: CPT

## 2021-07-28 PROCEDURE — 6370000000 HC RX 637 (ALT 250 FOR IP): Performed by: STUDENT IN AN ORGANIZED HEALTH CARE EDUCATION/TRAINING PROGRAM

## 2021-07-28 PROCEDURE — 2709999900 CT ABSCESS DRAINAGE

## 2021-07-28 PROCEDURE — 6370000000 HC RX 637 (ALT 250 FOR IP): Performed by: FAMILY MEDICINE

## 2021-07-28 PROCEDURE — 0W9B30Z DRAINAGE OF LEFT PLEURAL CAVITY WITH DRAINAGE DEVICE, PERCUTANEOUS APPROACH: ICD-10-PCS | Performed by: PHYSICIAN ASSISTANT

## 2021-07-28 PROCEDURE — 85014 HEMATOCRIT: CPT

## 2021-07-28 PROCEDURE — 84157 ASSAY OF PROTEIN OTHER: CPT

## 2021-07-28 PROCEDURE — 85610 PROTHROMBIN TIME: CPT

## 2021-07-28 PROCEDURE — 83605 ASSAY OF LACTIC ACID: CPT

## 2021-07-28 PROCEDURE — 6360000002 HC RX W HCPCS: Performed by: STUDENT IN AN ORGANIZED HEALTH CARE EDUCATION/TRAINING PROGRAM

## 2021-07-28 PROCEDURE — 89051 BODY FLUID CELL COUNT: CPT

## 2021-07-28 PROCEDURE — 2060000000 HC ICU INTERMEDIATE R&B

## 2021-07-28 PROCEDURE — 2700000000 HC OXYGEN THERAPY PER DAY

## 2021-07-28 PROCEDURE — 88112 CYTOPATH CELL ENHANCE TECH: CPT

## 2021-07-28 PROCEDURE — 99233 SBSQ HOSP IP/OBS HIGH 50: CPT | Performed by: PHYSICIAN ASSISTANT

## 2021-07-28 PROCEDURE — 87205 SMEAR GRAM STAIN: CPT

## 2021-07-28 PROCEDURE — 83615 LACTATE (LD) (LDH) ENZYME: CPT

## 2021-07-28 PROCEDURE — 6360000002 HC RX W HCPCS: Performed by: RADIOLOGY

## 2021-07-28 PROCEDURE — 94640 AIRWAY INHALATION TREATMENT: CPT

## 2021-07-28 PROCEDURE — 32555 ASPIRATE PLEURA W/ IMAGING: CPT | Performed by: RADIOLOGY

## 2021-07-28 PROCEDURE — 82150 ASSAY OF AMYLASE: CPT

## 2021-07-28 RX ORDER — MORPHINE SULFATE 2 MG/ML
2 INJECTION, SOLUTION INTRAMUSCULAR; INTRAVENOUS
Status: DISCONTINUED | OUTPATIENT
Start: 2021-07-28 | End: 2021-07-28

## 2021-07-28 RX ORDER — MIDAZOLAM HYDROCHLORIDE 2 MG/2ML
INJECTION, SOLUTION INTRAMUSCULAR; INTRAVENOUS
Status: COMPLETED | OUTPATIENT
Start: 2021-07-28 | End: 2021-07-28

## 2021-07-28 RX ORDER — FENTANYL CITRATE 50 UG/ML
50 INJECTION, SOLUTION INTRAMUSCULAR; INTRAVENOUS
Status: DISCONTINUED | OUTPATIENT
Start: 2021-07-28 | End: 2021-08-03 | Stop reason: HOSPADM

## 2021-07-28 RX ORDER — FENTANYL CITRATE 50 UG/ML
INJECTION, SOLUTION INTRAMUSCULAR; INTRAVENOUS
Status: COMPLETED | OUTPATIENT
Start: 2021-07-28 | End: 2021-07-28

## 2021-07-28 RX ORDER — 0.9 % SODIUM CHLORIDE 0.9 %
1000 INTRAVENOUS SOLUTION INTRAVENOUS ONCE
Status: COMPLETED | OUTPATIENT
Start: 2021-07-28 | End: 2021-07-28

## 2021-07-28 RX ORDER — MORPHINE SULFATE 4 MG/ML
4 INJECTION, SOLUTION INTRAMUSCULAR; INTRAVENOUS
Status: DISCONTINUED | OUTPATIENT
Start: 2021-07-28 | End: 2021-07-28

## 2021-07-28 RX ORDER — FENTANYL CITRATE 50 UG/ML
25 INJECTION, SOLUTION INTRAMUSCULAR; INTRAVENOUS
Status: DISCONTINUED | OUTPATIENT
Start: 2021-07-28 | End: 2021-08-03 | Stop reason: HOSPADM

## 2021-07-28 RX ORDER — FENTANYL CITRATE 50 UG/ML
25 INJECTION, SOLUTION INTRAMUSCULAR; INTRAVENOUS
Status: DISCONTINUED | OUTPATIENT
Start: 2021-07-28 | End: 2021-07-28

## 2021-07-28 RX ADMIN — ROPINIROLE HYDROCHLORIDE 1 MG: 1 TABLET, FILM COATED ORAL at 22:19

## 2021-07-28 RX ADMIN — IPRATROPIUM BROMIDE AND ALBUTEROL SULFATE 1 AMPULE: .5; 3 SOLUTION RESPIRATORY (INHALATION) at 15:59

## 2021-07-28 RX ADMIN — IPRATROPIUM BROMIDE AND ALBUTEROL SULFATE 1 AMPULE: .5; 3 SOLUTION RESPIRATORY (INHALATION) at 11:26

## 2021-07-28 RX ADMIN — HYDROXYZINE HYDROCHLORIDE 25 MG: 25 TABLET ORAL at 22:18

## 2021-07-28 RX ADMIN — PREGABALIN 200 MG: 100 CAPSULE ORAL at 22:18

## 2021-07-28 RX ADMIN — CARBAMAZEPINE 200 MG: 200 TABLET ORAL at 22:18

## 2021-07-28 RX ADMIN — MORPHINE SULFATE 4 MG: 4 INJECTION INTRAVENOUS at 14:09

## 2021-07-28 RX ADMIN — FENTANYL CITRATE 50 MCG: 50 INJECTION, SOLUTION INTRAMUSCULAR; INTRAVENOUS at 09:55

## 2021-07-28 RX ADMIN — MIDAZOLAM HYDROCHLORIDE 0.5 MG: 1 INJECTION, SOLUTION INTRAMUSCULAR; INTRAVENOUS at 09:18

## 2021-07-28 RX ADMIN — FERROUS SULFATE TAB EC 325 MG (65 MG FE EQUIVALENT) 325 MG: 325 (65 FE) TABLET DELAYED RESPONSE at 22:18

## 2021-07-28 RX ADMIN — AMLODIPINE BESYLATE 5 MG: 5 TABLET ORAL at 11:49

## 2021-07-28 RX ADMIN — TOPIRAMATE 50 MG: 25 TABLET, FILM COATED ORAL at 11:46

## 2021-07-28 RX ADMIN — BACLOFEN 10 MG: 10 TABLET ORAL at 11:48

## 2021-07-28 RX ADMIN — PIPERACILLIN AND TAZOBACTAM 3375 MG: 3; .375 INJECTION, POWDER, FOR SOLUTION INTRAVENOUS at 02:00

## 2021-07-28 RX ADMIN — MIDAZOLAM HYDROCHLORIDE 0.5 MG: 1 INJECTION, SOLUTION INTRAMUSCULAR; INTRAVENOUS at 09:12

## 2021-07-28 RX ADMIN — POTASSIUM CHLORIDE 20 MEQ: 1500 TABLET, EXTENDED RELEASE ORAL at 11:46

## 2021-07-28 RX ADMIN — BUSPIRONE HYDROCHLORIDE 15 MG: 15 TABLET ORAL at 22:18

## 2021-07-28 RX ADMIN — FERROUS SULFATE TAB EC 325 MG (65 MG FE EQUIVALENT) 325 MG: 325 (65 FE) TABLET DELAYED RESPONSE at 11:45

## 2021-07-28 RX ADMIN — TOPIRAMATE 50 MG: 25 TABLET, FILM COATED ORAL at 22:18

## 2021-07-28 RX ADMIN — SODIUM CHLORIDE, PRESERVATIVE FREE 10 ML: 5 INJECTION INTRAVENOUS at 11:44

## 2021-07-28 RX ADMIN — PIPERACILLIN AND TAZOBACTAM 3375 MG: 3; .375 INJECTION, POWDER, FOR SOLUTION INTRAVENOUS at 17:00

## 2021-07-28 RX ADMIN — MIDAZOLAM HYDROCHLORIDE 0.5 MG: 1 INJECTION, SOLUTION INTRAMUSCULAR; INTRAVENOUS at 10:10

## 2021-07-28 RX ADMIN — TRAZODONE HYDROCHLORIDE 50 MG: 50 TABLET ORAL at 22:18

## 2021-07-28 RX ADMIN — SODIUM CHLORIDE 1000 ML: 9 INJECTION, SOLUTION INTRAVENOUS at 03:29

## 2021-07-28 RX ADMIN — FENTANYL CITRATE 25 MCG: 50 INJECTION, SOLUTION INTRAMUSCULAR; INTRAVENOUS at 20:07

## 2021-07-28 RX ADMIN — FENTANYL CITRATE 50 MCG: 50 INJECTION, SOLUTION INTRAMUSCULAR; INTRAVENOUS at 09:43

## 2021-07-28 RX ADMIN — PIPERACILLIN AND TAZOBACTAM 3375 MG: 3; .375 INJECTION, POWDER, FOR SOLUTION INTRAVENOUS at 11:43

## 2021-07-28 RX ADMIN — FENTANYL CITRATE 25 MCG: 50 INJECTION, SOLUTION INTRAMUSCULAR; INTRAVENOUS at 22:42

## 2021-07-28 RX ADMIN — BUSPIRONE HYDROCHLORIDE 15 MG: 15 TABLET ORAL at 11:48

## 2021-07-28 RX ADMIN — MIDAZOLAM HYDROCHLORIDE 0.5 MG: 1 INJECTION, SOLUTION INTRAMUSCULAR; INTRAVENOUS at 09:26

## 2021-07-28 RX ADMIN — FENTANYL CITRATE 50 MCG: 50 INJECTION, SOLUTION INTRAMUSCULAR; INTRAVENOUS at 10:22

## 2021-07-28 RX ADMIN — PREGABALIN 200 MG: 100 CAPSULE ORAL at 11:45

## 2021-07-28 RX ADMIN — MORPHINE SULFATE 2 MG: 2 INJECTION, SOLUTION INTRAMUSCULAR; INTRAVENOUS at 03:14

## 2021-07-28 RX ADMIN — CARBAMAZEPINE 200 MG: 200 TABLET ORAL at 11:48

## 2021-07-28 RX ADMIN — MORPHINE SULFATE 2 MG: 2 INJECTION, SOLUTION INTRAMUSCULAR; INTRAVENOUS at 05:57

## 2021-07-28 RX ADMIN — MORPHINE SULFATE 2 MG: 2 INJECTION, SOLUTION INTRAMUSCULAR; INTRAVENOUS at 16:54

## 2021-07-28 RX ADMIN — BACLOFEN 10 MG: 10 TABLET ORAL at 22:18

## 2021-07-28 RX ADMIN — PREDNISONE 40 MG: 20 TABLET ORAL at 11:47

## 2021-07-28 RX ADMIN — SODIUM CHLORIDE 1000 ML: 9 INJECTION, SOLUTION INTRAVENOUS at 05:55

## 2021-07-28 RX ADMIN — MORPHINE SULFATE 4 MG: 4 INJECTION INTRAVENOUS at 11:42

## 2021-07-28 RX ADMIN — FOLIC ACID 1 MG: 1 TABLET ORAL at 11:47

## 2021-07-28 RX ADMIN — SPIRONOLACTONE 50 MG: 25 TABLET ORAL at 11:48

## 2021-07-28 ASSESSMENT — PAIN SCALES - GENERAL
PAINLEVEL_OUTOF10: 7
PAINLEVEL_OUTOF10: 6
PAINLEVEL_OUTOF10: 7
PAINLEVEL_OUTOF10: 10
PAINLEVEL_OUTOF10: 6

## 2021-07-28 ASSESSMENT — ENCOUNTER SYMPTOMS
ABDOMINAL DISTENTION: 0
APNEA: 0
COLOR CHANGE: 0
EYE DISCHARGE: 0
RHINORRHEA: 0
ABDOMINAL PAIN: 1

## 2021-07-28 NOTE — BRIEF OP NOTE
Brief Postoperative Note    Cheryl Guzmán  YOB: 1970  [id number]    Pre-operative Diagnosis: LUQ Abscess; left pleural effusion      Post-operative Diagnosis: Same    Procedure: Abscess drain placement; thoracentesis    Medication Given: fentanyl and versed    Anesthesia: 1%Lidocaine     Surgeons/Assistants:  Raymon Min MD and Ritika Saha PA-C    Estimated Blood Loss: Minimal    Complications: none    Specimen: Was collected    Procedure:  Successful placement of 8 Turkish pigtail catheter in LUQ intraabdominal abscess. Approximately 15 mL of dark red/brown fluid was obtained. Drain was sutured to skin and stay fix was applied. FRANSISCO bulb was attached. Pt tolerated procedure well and left the department in stable condition. Successful US guided Left thoracentesis. 400 mL of yellow fluid obtained.     Electronically signed by LILLIANA Ziegler on 7/28/2021 at 10:34 AM

## 2021-07-28 NOTE — PROGRESS NOTES
Patient is becoming increasingly more confused. Yelling at [facility], attempting to get out of bed, and taking off her oxygen refusing to but it back on sating 73% on room air. Writer was able to get the oxygen back on, now sating 88% on 4 liters. Telesitter now at bedside and Internal Medicine notified.

## 2021-07-28 NOTE — PLAN OF CARE
Nutrition Problem #1: Inadequate oral intake  Intervention: Food and/or Nutrient Delivery: Continue NPO (Resume PO diet as able)  Nutritional Goals: Intake able to meet >75% of estimated nutrition needs

## 2021-07-28 NOTE — PROGRESS NOTES
[facility]  Office: [address], DO, Venita Manan, DO, [facility], DO, Cheli Enrique Huang, DO, Cele Jain MD, Charity Murguia MD, Silas Slade MD, Kavin Mccarthy MD, Alexandre Rodriguez MD, Lito Edouard MD, Jessica Price MD, Curtis Velez, DO, Meir Cummins MD, Alexy Addison DO, Yasmin Verduzco MD,  Brandin Caruso, DO, Soo Daniel MD, Saniya Zuñiga MD, Juliet Wall MD, Teodoro Lomeli MD, Fartun Enriquez MD, Maureen Cancino MD, Arun Bustillo, [facility], [facility], CNP, Lucy Kelley, CNP, Mynor Olmstead, CNS, Cristina Sol, CNP, Ignacio Trujillo, CNP, Ernestina Hayden, CNP, Titi Norwood, CNP, Jerome Espinoza, CNP, Gus Wray PA-C, Magaly Antunez, [facility], Vargas Henao, CNP, Nimesh Schmitt, CNP, Javier Delaney, CNP, Benja Soares, CNP, Abdon Ariza, CNP, Heather Rodriguez, CNP, Jessica Carter, [facility], [place]ur[place]ha Hill, [address] [place] [address]    Progress Note    7/28/2021    12:58 PM    Name:   Kady Dutta  MRN:     [id number]     [place]:      [de-identified]   Room:   2022/2022-01   Day:  7  Admit Date:  7/21/2021  3:06 PM    PCP:   Jez Garcia MD  Code Status:  Full Code    Subjective:     C/C:   Chief Complaint   Patient presents with    Back Pain     states chronic back pain becoming worse.  has recently started physical therapy     Interval History Status:     Pt seen and evaluated this morning following pigtail catheter placement into LUQ intraabdominal abscess. She is resting in bed at the time of my assessment     Brief History:      This is a 54-year-old female with underlying history of hypertension, COPD, anxiety/depression, UTI, thoracic compression fractures, peripheral neuropathy, migraine headaches, seizure disorder, and alcoholism (last drink 6 months ago) who presents the emergency department with generalized malaise, headache and abdominal/back pain.    Initial blood work revealed leukocytosis, elevated alkaline phosphatase, she was tachycardic and hypoxic as well, imaging revealed picture of acute diverticulitis/colitis of left colon without peripheral abscess along with chronic pancreatitis, CT spine revealed stable T7-T9 and T11 compression fracture, worsening of compression fracture of T6 and new T5 and superior L5 subacute fracture. Admitted to the hospital for sepsis related to diverticulitis. Started on zosyn. Neurosurgery consulted regarding compression fractures, no surgical intervention inpatient. Pt condition continued to worsen. Chest x-ray suggestive of pneumonia. Pulmonology consulted. Hi flow nasal cannula initiated in conjunction with steroids. ID consulted for abx recommendations. 7/27: pt continued to deteriorate clinically. Repeat CT of the abdomen revealed LUQ abscess, left pleural effusion. IR consulted for thoracentesis and intraabdominal abscess drainage. General surgery consulted for intraabdominal abscess. Review of Systems:     Constitutional:  negative for chills, fevers, sweats  Respiratory:  + cough, dyspnea on exertion, shortness of breath  Cardiovascular:  negative for chest pain, chest pressure/discomfort, lower extremity edema, palpitations  Gastrointestinal:  + for abdominal pain. Negative for constipation, diarrhea, nausea, vomiting  Neurological:  negative for dizziness, headache    Medications:      Allergies:  No Known Allergies    Current Meds:   Scheduled Meds:    lidocaine  1 patch Transdermal Daily    spironolactone  50 mg Oral Daily    ipratropium-albuterol  1 ampule Inhalation 4x daily    polyethylene glycol  17 g Oral BID    carBAMazepine  200 mg Oral TID    topiramate  50 mg Oral BID    amLODIPine  5 mg Oral Daily    baclofen  10 mg Oral TID    budesonide-formoterol  2 puff Inhalation BID    busPIRone  15 mg Oral TID    ferrous sulfate  325 mg Oral BID    folic acid  1 mg Oral Daily    potassium chloride  20 mEq Oral Daily with breakfast    pregabalin  200 mg Oral TID    rOPINIRole  1 mg Oral Nightly    sodium chloride flush  5-40 mL Intravenous 2 times per day    enoxaparin  40 mg Subcutaneous Daily    piperacillin-tazobactam  3,375 mg Intravenous Q8H     Continuous Infusions:    lactated ringers 100 mL/hr at 07/27/21 2224    sodium chloride 25 mL (07/24/21 1803)     PRN Meds: morphine **OR** morphine, potassium chloride **OR** potassium alternative oral replacement **OR** potassium chloride, LORazepam, hyoscyamine, hydrOXYzine, bisacodyl, albuterol, traZODone, sodium chloride flush, sodium chloride, acetaminophen **OR** acetaminophen, [Held by provider] oxyCODONE-acetaminophen **OR** [DISCONTINUED] oxyCODONE-acetaminophen, melatonin    Data:     Past Medical History:   has a past medical history of Acute respiratory failure with hypoxia ([address] Alcohol withdrawal syndrome, with delirium ([address] Alcoholism ([address] Anemia, 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 Social History:   reports that she has been smoking cigarettes. She has a 15.00 pack-year smoking history. She has never used smokeless tobacco. She reports previous alcohol use. She reports current drug use. Frequency: 2.00 times per week. Drug: Marijuana.      Family History:   Family History   Problem Relation Age of Onset    Other Father     Cancer Maternal Grandmother     Heart Disease Paternal Grandmother     Esophageal Cancer Maternal Aunt        Vitals:  BP (!) 132/91   Pulse 109   Temp 98.6 °F (37 °C) (Oral) 07/28/2021 11:36 AM       Radiology:  XR THORACIC SPINE (3 VIEWS)    Result Date: 7/23/2021  Lumbar spine: Superior endplate fracture L5 which appears acute to subacute. No subluxation. Other vertebra well maintained. Spine significant compression deformity T6 with there are endplate loss in height T8, T9 and T10. No subluxation. Mild levo scoliotic curvature. Osteopenia complicates assessment. Pedicles are intact. Low lung volumes complicate assessment. There is suggestion of pleural effusions. XR LUMBAR SPINE (2-3 VIEWS)    Result Date: 7/23/2021  Lumbar spine: Superior endplate fracture L5 which appears acute to subacute. No subluxation. Other vertebra well maintained. Spine significant compression deformity T6 with there are endplate loss in height T8, T9 and T10. No subluxation. Mild levo scoliotic curvature. Osteopenia complicates assessment. Pedicles are intact. Low lung volumes complicate assessment. There is suggestion of pleural effusions. XR ABDOMEN (KUB) (SINGLE AP VIEW)    Result Date: 7/22/2021  Mild dilation of the right hemicolon and patchy small bowel gas most likely due to ileus. XR ACUTE ABD SERIES CHEST 1 VW    Result Date: 7/24/2021  Bilateral airspace disease and pleural effusions. Findings in the upper lobes appear increased compared to prior, left greater than right. Findings may be related to asymmetric edema with superimposed pneumonia not excluded. Gas and stool are seen throughout nondilated bowel loops with few nonspecific air-fluid levels in the right lower abdomen, likely in small bowel. Findings may be physiologic or related to mild ileus. No evidence of obstruction or free air. CT HEAD WO CONTRAST    Result Date: 7/25/2021  No acute intracranial abnormality. Suboccipital craniectomy. MRI THORACIC SPINE WO CONTRAST    Result Date: 7/23/2021  1. Limited examination. 2. Compression deformities involving T6, T8, T9 and T11.   These are likely chronic in nature. Diffusely decreased T1 and T2 marrow signal within the T6 vertebral body compatible with the sclerosis seen on the prior CT. 3. There is minimal bone marrow edema involving the left lateral elements of T6 and T7. Unclear whether this is degenerative in nature or perhaps sequelae of recent or remote trauma. 4. No significant spinal canal stenosis of the thoracic spine. 5. Moderate bilateral neural foraminal narrowing at T6-T7. 6. Bilateral pleural effusions, left greater than right. MRI LUMBAR SPINE WO CONTRAST    Result Date: 7/23/2021  1. Acute compression deformity of the superior endplate of L5 with approximately 35% loss of height. No significant bulging of the posterior cortex. 2. No significant spinal canal stenosis of the lumbar spine. 3. Neural foraminal narrowing at L3-L4 through L5-S1. 4. Minimal retrolisthesis at L5-S1. XR CHEST PORTABLE    Result Date: 7/25/2021  1. Increasing vascular congestion and left perihilar opacity. 2.  Pleural effusions and basilar opacities are otherwise without significant change. XR CHEST PORTABLE    Result Date: 7/23/2021  Interval development of bibasilar infiltrates and small pleural effusions which could represent dependent edema, pneumonia or aspiration. XR CHEST PORTABLE    Result Date: 7/22/2021  No radiologic evidence of acute cardiopulmonary disease. CT CHEST ABDOMEN PELVIS W CONTRAST    Result Date: 7/21/2021  Probable acute diverticulitis or colitis left colon. No evidence of perforation or abscess at this time. Chronic pancreatitis. Multiple compression fractures some of which are new since the previous exam. Lingular ground-glass infiltrate. Recommend follow-up to resolution. Intra-atrial lipoma right atrium. Cardiac echo may be helpful. CT LUMBAR SPINE TRAUMA RECONSTRUCTION    Result Date: 7/21/2021  CT thoracic spine: 1.   Decrease in height in T6 compared to prior study compatible with worsening compression with subacute etiology and mild protrusion of the dorsal aspect of T6. Narrowed T6-T7 neural foramina. 2.  Chronic superior height T8, T9, and T11 without interval change from prior study. 3.  Mild prevertebral soft tissue prominence T6 without evidence of abscess formation. CT lumbar spine: 1. No traumatic malalignment. 2.  Compression fracture superior L5 with subacute appearance. 3.  Calcifications in the abdomen incompletely included appearance suggesting pancreatic calcifications. RECOMMENDATIONS: Please reference CT chest, abdomen and pelvis of same day. CT THORACIC SPINE TRAUMA RECONSTRUCTION    Result Date: 7/21/2021  CT thoracic spine: 1. Decrease in height in T6 compared to prior study compatible with worsening compression with subacute etiology and mild protrusion of the dorsal aspect of T6. Narrowed T6-T7 neural foramina. 2.  Chronic superior height T8, T9, and T11 without interval change from prior study. 3.  Mild prevertebral soft tissue prominence T6 without evidence of abscess formation. CT lumbar spine: 1. No traumatic malalignment. 2.  Compression fracture superior L5 with subacute appearance. 3.  Calcifications in the abdomen incompletely included appearance suggesting pancreatic calcifications. RECOMMENDATIONS: Please reference CT chest, abdomen and pelvis of same day. Physical Examination:        General appearance:  Alert, acutely ill appearing. Does not appear to be in distress currently  Mental Status: oriented to self and place but not time. Delirious. Lungs:  Rhonchi bilaterally. Coarse respirations throughout  Heart:  regular rate and rhythm, no murmur  Abdomen:  soft, nondistended, normal bowel sounds, no masses, hepatomegaly, splenomegaly. There is tenderness in LLQ.  FRANSISCO drain present and draining what appears to be gross blood  Extremities:  no edema, redness, tenderness in the calves  Skin:  Diffuse mottled appearing rash covering back consistent with livedo reticularis    Assessment:        Hospital Problems         Last Modified POA    * (Principal) Severe sepsis ([address] 7/28/2021 Yes    Essential hypertension 7/21/2021 Yes    Tobacco use 7/21/2021 Yes    Seizure disorder ([address] 7/25/2021 Yes    COPD with acute exacerbation ([address] 7/22/2021 Yes    Acute respiratory failure with hypoxia ([address] 7/22/2021 Yes    Recurrent major depressive disorder ([address] 7/21/2021 Yes    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24, the patient underwent 2 surgical resections without known recurrence 2/79/3190 Yes    Metabolic encephalopathy 8/79/2403 Yes    AMAN (generalized anxiety disorder) 7/21/2021 Yes    Chronic peripheral neuropathic pain 7/21/2021 Yes    History of alcohol abuse 7/21/2021 Yes    Personal history of astrocytoma 7/21/2021 Yes    Marijuana abuse 7/21/2021 Yes    Closed fracture of fifth thoracic vertebra ([address] 7/22/2021 Yes    Diverticulitis of large intestine with abscess without bleeding 7/28/2021 Yes    Elevated brain natriuretic peptide (BNP) level 7/22/2021 Yes    Elevated alkaline phosphatase level 7/22/2021 Yes    Chronic pancreatitis ([address] 7/22/2021 Yes    Erythema ab igne 7/22/2021 Yes    Compression fracture of thoracic vertebra ([address] 7/23/2021 Yes    Compression of lumbar vertebra ([address] 3/91/3998 Yes    Metabolic acidosis with increased anion gap and accumulation of organic acids 7/28/2021 Yes          Plan:        #Severe sepsis secondary to diverticulitis with intraabdominal abscess formation. Continue zosyn. S/p IR guided drainage of intraabdominal abscess. FRANSISCO drain present. ID following. Continue LR @ 100 ml/h. Discussed case with general surgery team. H/H q8h as gross blood coming from FRANSISCO drain. Transfuse Hgb <7.    #Metabolic encephalopathy secondary to sepsis     #New fracture at T5 and L5,Chronic compression fracture in the thoracic vertebrae, there is worsening and compression fracture at T6 : Neurosurgery with no plans for surgical intervention inpatient. Needs OP DEXA scan and f/u OP for consideration of kyphoplasty.      #Acute hypoxemic respiratory failure: secondary to decompensated COPD/pneumonia/left pleural effusion. Pulmonology following. Remains on zosyn. Discontinue steroids. S/p thoracentesis today, follow-up fluid studies.     #Seizure disorder: Follows up with neurology as an outpatient, continue antiepileptic drugs as ordered. Neurology following.  EEG ordered.      #HTN: continue amlodipine, aldactone as BP tolerates     #HPL: continue home medications     #DVT prophylaxis     Kenneth Lawton PA-C  7/28/2021  12:58 PM

## 2021-07-28 NOTE — PROGRESS NOTES
[facility] -   Infectious diseases evaluation  admission date 7/21/2021    reason for consultation:   Diverticulosis/ leukocytosis and rash    Impression :   Current:  · bandemia  · Acute diverticulitis or L colitis, likely perforated w abd bleed  · 7/27 left large abd bloody collection  · Lingular infiltrate  · 7/27 Left large pleural effusion   · Lactic acidosis  · 7/21/21 new Skin rash on the back  · CRP elevation  · Anxiety  · Livedo reticularis  On back    Other:  · Copd w prednisone  · Hx astrocytoma age 24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 recurrent -  · SZ on tx  · Osteoporosis  · Migraine headache   · Chronic pancreatitis  Discussion / summary of stay / plan of care   ·   Recommendations   · Keep zosyn for now -7/21   · Pend cx from  left thoracentesis and left abd collection drainage   · GS on board  · Rash seems like livedo reticularis and is a fluctuating in intensity -not relevant at this time      Infection Control Recommendations   · [place] Precautions    Antimicrobial Stewardship Recommendations   · Simplification of therapy  · Targeted therapy    Coordination ofOutpatient Care:   · Estimated Length of IV antimicrobials:  · Patient will need Midline / picc Catheter Insertion:   · Patient will need SNF:  · Patient will need outpatient wound care:     History of Present Illness:   Initial history:  Dorie Hua is a 45y.o.-year-old female w abd  And back pain and hospital and found T spine cp fracture acute and sub acute, no sx plans,   Found w lingular infiltrate and started on zosyn, for presumed aspiration pneumonia. Also on CTAP left diverticulitis and no abscess or perforation seen, but surround left colitis seen as well. Started on zosyn since 7/21 and eating and feels better,m still a little tired and sleepy but eating. leukocytosis still elevated while getting steroids for COPD.   Rash on the back noticed since prior to admission, unclear cause-    ID called for the leukocytosis and rash. On off - unclear if present x long time PTA since she lives by herself and the rash is only on the back and post right arm. She had fevers at admission and is down to LGF since    On exam she is very anxious and ox 3 - easily emotional              Interval changes  7/28/2021   Patient Vitals for the past 8 hrs:   BP Temp Temp src Pulse Resp SpO2   07/28/21 0908 (!) 142/91 -- -- 115 23 95 %   07/28/21 0855 (!) 117/94 -- -- 116 (!) 35 (!) 88 %   07/28/21 0851 -- -- -- -- 24 --   07/28/21 0800 (!) 134/93 101.1 °F (38.4 °C) Oral 115 29 91 %   07/28/21 0400 -- -- -- -- 18 --   07/28/21 0339 (!) 148/92 98.4 °F (36.9 °C) Axillary 98 18 100 %     Less anxious and abd less sore  WBC and lactae alevated  CRP still elevated    CT AP show 7/27 left abd collection, and left pleura moderate size effusion - GS note appreciated    IR drainage: 7/28/21  · Pleural: PH   7.5, prot 3.6, LDH 2125, amylase 4100, WBC <3000, RBC 2125. cx pend    · abd fluid collection drain placed and fluid is serosang, , RBC 45974, amylase 404, ,  cx pend    Labs reviewed    Summary of relevant labs:  Labs:  W 19 - 17 - 19 - 17  Creat  0.68   - 269 - 291  Procalcitonin0.16   Lactic Acid 8.7    Micro:   BC 7/27    Imaging:  CXR 7/27  Interval improvement in still mild interstitial edema with bilateral   asymmetric, left greater than right, pleural effusions       CT AP 7/27  Large complex fluid collection in the left upper quadrant, concerning for   abscess. This is likely related to the previously described colitis. 2. Consolidation and ground-glass opacity in both lower lobes with left   pleural effusion, concerning for multifocal pneumonia. 3. Diffuse urinary bladder wall thickening. This could be due to   underdistention, but correlation for any signs or symptoms of cystitis is   recommended. 4. Chronic pancreatitis. 5. Nonobstructing left nephrolithiasis.        CT head 7/25  No acute intracranial abnormality. Suboccipital craniectomy. CT AP 7/21   Probable acute diverticulitis or colitis left colon. No evidence of perforation or abscess at this time. Chronic pancreatitis. Multiple compression fractures some of which are new since the previous exam.   Lingular ground-glass infiltrate. Recommend follow-up to resolution. Intra-atrial lipoma right atrium. Cardiac echo may be helpful. I have personally reviewed the past medical history, past surgical history, medications, social history, and family history, and I haveupdated the database accordingly. Allergies:   Patient has no known allergies. Review of Systems:     Review of Systems   Constitutional: Negative for activity change and appetite change. HENT: Negative for congestion and rhinorrhea. Eyes: Negative for discharge. Respiratory: Negative for apnea. Cardiovascular: Negative for chest pain. Gastrointestinal: Positive for abdominal pain. Negative for abdominal distention. Endocrine: Negative for heat intolerance and polyphagia. Genitourinary: Negative for dysuria and flank pain. Musculoskeletal: Negative for arthralgias. Skin: Positive for rash. Negative for color change. Allergic/Immunologic: Negative for immunocompromised state. Neurological: Positive for tremors. Negative for dizziness and headaches. Hematological: Negative for adenopathy. Psychiatric/Behavioral: Positive for agitation. The patient is nervous/anxious. Physical Examination :       Physical Exam  Constitutional:       General: She is not in acute distress. Appearance: Normal appearance. She is not ill-appearing. HENT:      Head: Normocephalic and atraumatic. Nose: Nose normal.      Mouth/Throat:      Mouth: Mucous membranes are moist.   Eyes:      General: No scleral icterus. Pupils: Pupils are equal, round, and reactive to light.    Cardiovascular:      Rate and Rhythm: Normal rate and regular rhythm. Heart sounds: Normal heart sounds. No murmur heard. Pulmonary:      Effort: No respiratory distress. Breath sounds: Normal breath sounds. Abdominal:      General: There is no distension. Palpations: Abdomen is soft. Tenderness: There is abdominal tenderness. Right CVA tenderness: left lower quadrant. Genitourinary:     Comments: No helms  Musculoskeletal:         General: No swelling, deformity or signs of injury. Cervical back: Neck supple. No rigidity or tenderness. Skin:     General: Skin is dry. Coloration: Skin is not jaundiced. Findings: No bruising or erythema. Neurological:      General: No focal deficit present. Mental Status: She is alert and oriented to person, place, and time. Cranial Nerves: No cranial nerve deficit. Sensory: No sensory deficit. Comments: In tears - very anxious   Psychiatric:         Mood and Affect: Mood normal.         Thought Content:  Thought content normal.         Past Medical History:     Past Medical History:   Diagnosis Date    Acute respiratory failure with hypoxia ([address] 10/16/2020    Alcohol withdrawal syndrome, with delirium ([address] 12/14/2019    Alcoholism ([address]     Anemia 10/2020    GI bleed    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24, the patient underwent 2 surgical resections without known recurrence 10/23/2020    Closed fracture of lateral portion of left tibial plateau 28/79/1606    COPD (chronic obstructive pulmonary disease) ([address]     CO2 retainer, on Bipap at night for this, Dr. Bennie Ingram ( last visit 11/20/2020 and note on chart )    Depression     bipolar, major depressive disorder, ptsd, anxiety    Dysphagia     GI bleed 10/2020    Hypertension     Memory loss     Oxygen dependent     pt stated not needed as of 12/9/2020    Pain, joint, ankle and foot     Pancreatic lesion 10/2020    Dr. Valerie Ruano working up pt    Peripheral neuropathy     Seizures ([address] 5 mg Oral Daily    baclofen  10 mg Oral TID    budesonide-formoterol  2 puff Inhalation BID    busPIRone  15 mg Oral TID    ferrous sulfate  325 mg Oral BID    folic acid  1 mg Oral Daily    potassium chloride  20 mEq Oral Daily with breakfast    pregabalin  200 mg Oral TID    rOPINIRole  1 mg Oral Nightly    sodium chloride flush  5-40 mL Intravenous 2 times per day    enoxaparin  40 mg Subcutaneous Daily    piperacillin-tazobactam  3,375 mg Intravenous Q8H       Social History:     Social History     Socioeconomic History    Marital status: Single     Spouse name: Not on file    Number of children: Not on file    Years of education: Not on file    Highest education level: Not on file   Occupational History    Not on file   Tobacco Use    Smoking status: Current Every Day Smoker     Packs/day: 0.50     Years: 30.00     Pack years: 15.00     Types: Cigarettes    Smokeless tobacco: Never Used    Tobacco comment: plans on quitting in the future    Vaping Use    Vaping Use: Never used   Substance and Sexual Activity    Alcohol use: Not Currently     Alcohol/week: 0.0 standard drinks    Drug use: Yes     Frequency: 2.0 times per week     Types: Marijuana    Sexual activity: Not on file   Other Topics Concern    Not on file   Social History Narrative    Not on file     Social Determinants of Health     Financial Resource Strain: Medium Risk    Difficulty of Paying Living Expenses: Somewhat hard   Food Insecurity: No Food Insecurity    Worried About Running Out of Food in the Last Year: Never true    [place] of Food in the Last Year: Never true   Transportation Needs:     Lack of Transportation (Medical):      Lack of Transportation (Non-Medical):    Physical Activity:     Days of Exercise per Week:     Minutes of Exercise per Session:    Stress:     Feeling of Stress :    Social Connections:     Frequency of Communication with Friends and Family:     Frequency of Social Gatherings with Friends and Family:     Attends Nondenominational Services:     Active Member of Clubs or Organizations:     Attends Club or Organization Meetings:     Marital Status:    Intimate Partner Violence:     Fear of Current or Ex-Partner:     Emotionally Abused:     Physically Abused:     Sexually Abused:        Family History:     Family History   Problem Relation Age of Onset    Other Father     Cancer Maternal Grandmother     Heart Disease Paternal Grandmother     Esophageal Cancer Maternal Aunt       Medical Decision Making:   I have independently reviewed/ordered the following labs:    CBC with Differential:   Recent Labs     07/27/21  0511 07/28/21  0453   WBC 19.4* 17.0*   HGB 11.9 8.5*   HCT 37.9 27.9*    307   LYMPHOPCT 6* 12*   MONOPCT 7 10*     BMP:  Recent Labs     07/27/21  0511 07/28/21  0453    135   K 3.2* 3.7   CL 99 101   CO2 26 15*   BUN 18 11   CREATININE 0.68 0.34*   MG 1.6  --      Hepatic Function Panel: No results for input(s): PROT, LABALBU, BILIDIR, IBILI, BILITOT, ALKPHOS, ALT, AST in the last 72 hours. No results for input(s): RPR in the last 72 hours. No results for input(s): HIV in the last 72 hours. No results for input(s): BC in the last 72 hours. Lab Results   Component Value Date    CREATININE 0.34 07/28/2021    GLUCOSE 200 07/28/2021    GLUCOSE 92 04/30/2012       Detailed results: Thank you for allowing us to participate in the care of this patient. Please call with questions. This note is created with the assistance of a speech recognition program.  While intending to generate adocument that actually reflects the content of the visit, the document can still have some errors including those of syntax and sound a like substitutions which may escape proof reading. It such instances, actual meaningcan be extrapolated by contextual diversion.     Luis Story MD  Office: [phone]  Perfect serve / office [phone]

## 2021-07-28 NOTE — CONSULTS
General Surgery  Consult    PATIENT NAME: Ian Merlin  AGE: 45 y.o. MEDICAL RECORD NO. [id number]  DATE: 7/28/2021  SURGEON: Dr. Mila Artis: Tiffany Che MD    Patient evaluated at the request of  Dr. Fabby Curry  Reason for evaluation: Acute diverticulitis with new large abdominal abscess formation     Patient information was obtained from patient and past medical records. History/Exam limitations: mental status.     IMPRESSION:     Patient Active Problem List   Diagnosis    Acute bronchitis    Reflex sympathetic dystrophy of lower limb    Essential hypertension    Nicotine addiction    Psychophysiological insomnia    Anxiety    Alcoholic peripheral neuropathy (HCC)    Hypokalemia    Macrocytic anemia    Hypomagnesemia    Tobacco use    Ambulatory dysfunction    Seizure disorder ([address]    COPD with acute exacerbation (HCC)    Paresthesia of lower extremity    Acute respiratory failure with hypoxia (HCC)    Pancreatic cyst    Recurrent major depressive disorder (HCC)    Elevated CA 19-9 level    Perineal mass, female    Esophagitis candidal     Condyloma    Astrocytoma (HCC) - 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24, the patient underwent 2 surgical resections without known recurrence    Alcohol use disorder, severe, in early remission ([address]    Metabolic encephalopathy    AMAN (generalized anxiety disorder)    Major depressive disorder, recurrent severe without psychotic features ([address]    Esophageal dysphagia    Chronic peripheral neuropathic pain    History of alcohol abuse    History of petit-mal seizures    Intractable episodic tension-type headache    Personal history of astrocytoma    Multilevel spine pain    Chronic pain syndrome    Marijuana abuse    Severe sepsis (HCC)    Closed fracture of fifth thoracic vertebra (HCC)    Diverticulitis of large intestine with abscess without bleeding    Elevated brain natriuretic peptide (BNP) level    Elevated alkaline phosphatase level    Chronic pancreatitis (HCC)    Erythema ab igne    Compression fracture of thoracic vertebra (HCC)    Compression of lumbar vertebra (HCC)    Metabolic acidosis with increased anion gap and accumulation of organic acids   50-year-old male who presents with sepsis likely source from left-sided intra-abdominal abscess. PLAN:   Due to patient's worsening clinical presentation and lactic acidosis of 8.7, highly recommend IR drainage of left intra-abdominal abscess as soon as possible to obtain source control. Continue IV antibiotics per infectious disease. Recommend holding steroids due to active infection. Recommend additional IV hydration for lactic acidosis and septic picture. Thank you for the consult. Please call/page us if you have any questions/concerns. We appreciate being involved in the care of your patient. HISTORY:   History of Chief Complaint:    Orly Rader is a 45 y.o. female with history of Astrocytoma s/p resection, COPD, diverticulitis and chronic pancreatitis who presents with worsening abdominal pain. Due to patient's mental status, she unable to participate with examination. Per previous documentation, patient presented to the emergency department on 7/21/2021 with generalized fatigue, headache and back pain. She was initiated on treatment for presumed aspiration pneumonia including IV antibiotics. She was also treated for acute COPD exacerbation and started on a steroid taper. CT chest abdomen pelvis on admission showed possible left diverticulitis/colitis with minimal abscess present. Over the course of her hospital stay, patient developed worsening mental status and a second CT scan was done, which showed a large complex fluid-filled collection in the left upper quadrant with concern for abscess. She was also found to have diffuse urinary bladder wall thickening and bilateral pleural effusions more severe on the left.   Patient was found to have a lactic acidosis and a 8.7 and a leukocytosis of 19.3. Past Medical History   has a past medical history of Acute respiratory failure with hypoxia ([facility] Alcohol withdrawal syndrome, with delirium ([address] Alcoholism ([address] Anemia, Astrocytoma ([facility] - 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24, the patient underwent 2 surgical resections without known recurrence, Closed fracture of lateral portion of left tibial plateau, COPD (chronic obstructive pulmonary disease) ([address] Depression, Dysphagia, GI bleed, Hypertension, Memory loss, Oxygen dependent, Pain, joint, ankle and foot, Pancreatic lesion, Peripheral neuropathy, Seizures ([address] Tension headache, Under care of team, Under care of team, Under care of team, Under care of team, Under care of team, and Wellness examination. Past Surgical History   has a past surgical history that includes Hysterectomy (2003); Brain tumor excision (1989); fracture surgery (Left, 7-3-13); fracture surgery (Right); Upper gastrointestinal endoscopy (N/A, 10/22/2020); Colonoscopy (N/A, 10/22/2020); Endoscopic ultrasonography, GI (N/A, 12/9/2020); Upper gastrointestinal endoscopy (N/A, 4/5/2021); Hand surgery; and CT ABSCESS DRAINAGE (7/28/2021). Medications  Prior to Admission medications    Medication Sig Start Date End Date Taking? Authorizing Provider   acamprosate (CAMPRAL) 333 MG tablet Take 2 tablets by mouth 3 times daily 7/16/21 8/15/21  Gian Stephens APRN - NP   sertraline (ZOLOFT) 50 MG tablet Take 3 tablets by mouth daily for 7 days, THEN 2 tablets daily for 7 days, THEN 1 tablet daily for 7 days. 7/16/21 8/6/21  Gian Stephens APRN - NP   FLUoxetine (PROZAC) 20 MG tablet Take 0.5 tablets by mouth daily for 7 days, THEN 1 tablet daily for 23 days.  7/16/21 8/15/21  Gian Stephens APRN - NP   traZODone (DESYREL) 50 MG tablet TAKE 1 AND 1/2 TABLET BY MOUTH ONCE NIGHTLY AS NEEDED FOR SLEEP 7/16/21   Darlina Mom, APRN - NP   thermotabs (MEDI-LYTE) TABS tablet Take 1 tablet by mouth daily for 3 days 7/9/21 7/12/21  Stefano Bond APRN - NP   carBAMazepine (TEGRETOL XR) 200 MG extended release tablet Take 1 tablet by mouth 2 times daily 7/6/21   Irvin Mckeon MD   topiramate (TOPAMAX) 25 MG tablet Take 2 tablets by mouth daily 7/6/21   Irvin Mckeon MD   pregabalin (LYRICA) 200 MG capsule Take 1 capsule by mouth 3 times daily for 90 days.  7/2/21 9/30/21  Irvin Mckeon MD   KLOR-CON M20 20 MEQ extended release tablet TAKE ONE TABLET BY MOUTH DAILY 6/24/21   Ludivina Metzger MD   amLODIPine (NORVASC) 5 MG tablet TAKE ONE TABLET BY MOUTH DAILY 6/3/21   Ludivina Metzger MD   busPIRone (BUSPAR) 15 MG tablet Take 1 tablet by mouth 3 times daily 5/5/21   Ludivina Metzger MD   baclofen (LIORESAL) 10 MG tablet Take 1 tablet by mouth 3 times daily 5/25/21   Ludivina Metzger MD   ibuprofen (ADVIL;MOTRIN) 600 MG tablet Take 1 tablet by mouth 3 times daily as needed for Pain 5/25/21   Ludivina Metzger MD   sertraline (ZOLOFT) 100 MG tablet Take 2 tablets by mouth daily 5/17/21   LOI Banks - NP   ferrous sulfate (IRON 325) 325 (65 Fe) MG tablet Take 1 tablet by mouth 2 times daily 4/22/21   Ludivina Metzger MD   rOPINIRole (REQUIP) 1 MG tablet Take 1 tablet by mouth nightly 4/1/21   Ludivina Metzger MD   budesonide-formoterol [facility]) 160-4.5 MCG/ACT AERO Inhale 2 puffs into the lungs 2 times daily 3/31/21   Ludivina Metzger MD   hydrOXYzine (ATARAX) 50 MG tablet TAKE ONE TABLET BY MOUTH THREE TIMES A DAY 3/12/21   Ludivina Metzger MD   tiotropium (SPIRIVA RESPIMAT) 2.5 MCG/ACT AERS inhaler Inhale 2 puffs into the lungs daily 2/26/21 7/8/21  Ismael Mccoy MD   sodium chloride (ALTAMIST SPRAY) 0.65 % nasal spray 1 spray by Nasal route as needed for Congestion 12/28/20   Ludivina Metzger MD   ACETAMINOPHEN EXTRA STRENGTH 500 MG tablet Take 500 mg by mouth 3 times daily as needed 5/8/20   Historical Provider, MD   albuterol sulfate HFA (VENTOLIN HFA) 108 (90 Base) MCG/ACT inhaler Inhale 2 puffs into the lungs 4 times daily as needed for Wheezing 6/11/20   Ludivina Lizama MD   vitamin B-1 (THIAMINE) 100 MG tablet Take 1 tablet by mouth daily 7/12/19   Ludivina Lizama MD   vitamin D (ERGOCALCIFEROL) 86208 units CAPS capsule Take 1 capsule by mouth once a week 6/19/19   Ludivina Lizama MD   folic acid (FOLVITE) 1 MG tablet Take 1 tablet by mouth daily 6/19/19   Ludivina Lizama MD    Scheduled Meds:   lidocaine  1 patch Transdermal Daily    spironolactone  50 mg Oral Daily    ipratropium-albuterol  1 ampule Inhalation 4x daily    polyethylene glycol  17 g Oral BID    carBAMazepine  200 mg Oral TID    topiramate  50 mg Oral BID    amLODIPine  5 mg Oral Daily    baclofen  10 mg Oral TID    budesonide-formoterol  2 puff Inhalation BID    busPIRone  15 mg Oral TID    ferrous sulfate  325 mg Oral BID    folic acid  1 mg Oral Daily    potassium chloride  20 mEq Oral Daily with breakfast    pregabalin  200 mg Oral TID    rOPINIRole  1 mg Oral Nightly    sodium chloride flush  5-40 mL Intravenous 2 times per day    enoxaparin  40 mg Subcutaneous Daily    piperacillin-tazobactam  3,375 mg Intravenous Q8H     Continuous Infusions:   lactated ringers 100 mL/hr at 07/27/21 2224    sodium chloride 25 mL (07/24/21 1803)     PRN Meds:.morphine **OR** morphine, potassium chloride **OR** potassium alternative oral replacement **OR** potassium chloride, LORazepam, hyoscyamine, hydrOXYzine, bisacodyl, albuterol, traZODone, sodium chloride flush, sodium chloride, acetaminophen **OR** acetaminophen, [Held by provider] oxyCODONE-acetaminophen **OR** [DISCONTINUED] oxyCODONE-acetaminophen, melatonin  Allergies  has No Known Allergies. Family History  family history includes Cancer in her maternal grandmother; Esophageal Cancer in her maternal aunt; Heart Disease in her paternal grandmother; Other in her father. Social History   reports that she has been smoking cigarettes. She has a 15.00 pack-year smoking history. She has never used smokeless tobacco.   reports previous alcohol use. reports current drug use. Frequency: 2.00 times per week. Drug: Marijuana. Review of Systems  Unable to be obtained due to patient's altered mental status    PHYSICAL:   VITALS:  height is 5' 5\" (1.651 m) and weight is 143 lb 4.8 oz (65 kg). Her oral temperature is 98.6 °F (37 °C). Her blood pressure is 132/91 (abnormal) and her pulse is 109. Her respiration is 28 and oxygen saturation is 88% (abnormal). CONSTITUTIONAL: Confused, repeating single line of thought to multiple times, appears unwell  HEENT: Head is normocephalic, atraumatic. NECK: Soft, trachea midline and straight  LUNGS: Symmetrical rise and fall of chest, no acute respiratory distress.   CARDIOVASCULAR:   Regular rate and rhythm   ABDOMEN: Soft, distended, diffusely tender to palpation, more severe in the left upper and lower quadrants extending to the suprapubic area, nonperitoneal  NEUROLOGIC: Spontaneously moving all extremities  EXTREMITIES: no cyanosis, clubbing or edema    LABS:     Recent Labs     07/26/21  0623 07/27/21  0511 07/28/21  0159 07/28/21  0453 07/28/21  0754   WBC 19.3* 19.4*  --  17.0*  --    HGB 11.5* 11.9  --  8.5*  --    HCT 35.5* 37.9  --  27.9*  --     391  --  307  --     141  --  135  --    K 3.5* 3.2*  --  3.7  --    CL 96* 99  --  101  --    CO2 27 26  --  15*  --    BUN 18 18  --  11  --    CREATININE 0.80 0.68  --  0.34*  --    MG  --  1.6  --   --   --    CALCIUM 8.4* 8.7  --  7.1*  --    INR  --   --  1.1 1.1  --    NITRU  --   --   --   --  NEGATIVE   COLORU  --   --   --   --  YELLOW   BACTERIA  --   --   --   --  NOT REPORTED     Recent Labs     07/28/21  0005   AMYLASE 105*   LIPASE 101*     CBC with Differential:    Lab Results   Component Value Date    WBC 17.0 07/28/2021    RBC 2.89 07/28/2021    RBC 4.16 04/30/2012    HGB 8.5 07/28/2021    HCT 27.9 07/28/2021     07/28/2021     04/30/2012    MCV 96.5 07/28/2021    MCH 29.4 07/28/2021    MCHC 30.5 07/28/2021    RDW 13.3 07/28/2021    LYMPHOPCT 12 07/28/2021    MONOPCT 10 07/28/2021    BASOPCT 0 07/28/2021    MONOSABS 1.70 07/28/2021    LYMPHSABS 2.04 07/28/2021    EOSABS 0.17 07/28/2021    BASOSABS 0.00 07/28/2021    DIFFTYPE NOT REPORTED 07/28/2021     BMP:    Lab Results   Component Value Date     07/28/2021    K 3.7 07/28/2021     07/28/2021    CO2 15 07/28/2021    BUN 11 07/28/2021    LABALBU 2.7 07/24/2021    CREATININE 0.34 07/28/2021    CALCIUM 7.1 07/28/2021    GFRAA >60 07/28/2021    LABGLOM >60 07/28/2021    GLUCOSE 200 07/28/2021    GLUCOSE 92 04/30/2012     U/A:    Lab Results   Component Value Date    NITRITE neg 12/18/2018    COLORU YELLOW 07/28/2021    PROTEINU NEGATIVE 07/28/2021    PHUR 8.0 07/28/2021    WBCUA 0 TO 2 07/28/2021    RBCUA 2 TO 5 07/28/2021    MUCUS NOT REPORTED 07/28/2021    TRICHOMONAS NOT REPORTED 07/28/2021    YEAST NOT REPORTED 07/28/2021    BACTERIA NOT REPORTED 07/28/2021    CLARITYU cloudy 12/18/2018    SPECGRAV 1.032 07/28/2021    LEUKOCYTESUR NEGATIVE 07/28/2021    UROBILINOGEN Normal 07/28/2021    BILIRUBINUR NEGATIVE 07/28/2021    BILIRUBINUR moderate 12/18/2018    BLOODU neg 12/18/2018    GLUCOSEU NEGATIVE 07/28/2021    AMORPHOUS NOT REPORTED 07/28/2021     AMYLASE:    Lab Results   Component Value Date    AMYLASE 105 07/28/2021     LIPASE:    Lab Results   Component Value Date    LIPASE 101 07/28/2021     Lactic acid 8.7    RADIOLOGY:     IR GUIDED THORACENTESIS PLEURAL   Final Result   Successful ultrasound guided thoracentesis. CT ABSCESS DRAINAGE   Final Result   Successful percutaneous ultrasound and CT guided placement of 8 [place] left   upper quadrant drainage catheter.       New high attenuation material within the multi loculated left upper quadrant   fluid collection suggests leakage from the adjacent abnormal colonic segment   indicating micro perforation. The above findings were called by Dr. Johnie Kan MD to Dr. Jory Hernandez On 7/28/2021 at 12:32. CT ABDOMEN PELVIS W IV CONTRAST Additional Contrast? Radiologist Recommendation   Final Result   1. Large complex fluid collection in the left upper quadrant, concerning for   abscess. This is likely related to the previously described colitis. 2. Consolidation and ground-glass opacity in both lower lobes with left   pleural effusion, concerning for multifocal pneumonia. 3. Diffuse urinary bladder wall thickening. This could be due to   underdistention, but correlation for any signs or symptoms of cystitis is   recommended. 4. Chronic pancreatitis. 5. Nonobstructing left nephrolithiasis. XR CHEST (SINGLE VIEW FRONTAL)   Final Result   Interval improvement in still mild interstitial edema with bilateral   asymmetric, left greater than right, pleural effusions         CT HEAD WO CONTRAST   Final Result   No acute intracranial abnormality. Suboccipital craniectomy. XR CHEST PORTABLE   Final Result   1. Increasing vascular congestion and left perihilar opacity. 2.  Pleural effusions and basilar opacities are otherwise without significant   change. XR ACUTE ABD SERIES CHEST 1 VW   Final Result   Bilateral airspace disease and pleural effusions. Findings in the upper   lobes appear increased compared to prior, left greater than right. Findings   may be related to asymmetric edema with superimposed pneumonia not excluded. Gas and stool are seen throughout nondilated bowel loops with few nonspecific   air-fluid levels in the right lower abdomen, likely in small bowel. Findings   may be physiologic or related to mild ileus. No evidence of obstruction or   free air. XR LUMBAR SPINE (2-3 VIEWS)   Final Result   Lumbar spine: Superior endplate fracture L5 which appears acute to subacute. No subluxation. Other vertebra well maintained. Spine significant compression deformity T6 with there are endplate loss in   height T8, T9 and T10. No subluxation. Mild levo scoliotic curvature. Osteopenia complicates assessment. Pedicles are intact. Low lung volumes   complicate assessment. There is suggestion of pleural effusions. XR THORACIC SPINE (3 VIEWS)   Final Result   Lumbar spine: Superior endplate fracture L5 which appears acute to subacute. No subluxation. Other vertebra well maintained. Spine significant compression deformity T6 with there are endplate loss in   height T8, T9 and T10. No subluxation. Mild levo scoliotic curvature. Osteopenia complicates assessment. Pedicles are intact. Low lung volumes   complicate assessment. There is suggestion of pleural effusions. MRI THORACIC SPINE WO CONTRAST   Final Result   1. Limited examination. 2. Compression deformities involving T6, T8, T9 and T11. These are likely   chronic in nature. Diffusely decreased T1 and T2 marrow signal within the T6   vertebral body compatible with the sclerosis seen on the prior CT. 3. There is minimal bone marrow edema involving the left lateral elements of   T6 and T7. Unclear whether this is degenerative in nature or perhaps   sequelae of recent or remote trauma. 4. No significant spinal canal stenosis of the thoracic spine. 5. Moderate bilateral neural foraminal narrowing at T6-T7.   6. Bilateral pleural effusions, left greater than right. MRI LUMBAR SPINE WO CONTRAST   Final Result   1. Acute compression deformity of the superior endplate of L5 with   approximately 35% loss of height. No significant bulging of the posterior   cortex. 2. No significant spinal canal stenosis of the lumbar spine. 3. Neural foraminal narrowing at L3-L4 through L5-S1.   4. Minimal retrolisthesis at L5-S1.          XR CHEST PORTABLE   Final Result   Interval development of bibasilar infiltrates and small pleural effusions   which could represent dependent edema, pneumonia or aspiration. XR ABDOMEN (KUB) (SINGLE AP VIEW)   Final Result   Mild dilation of the right hemicolon and patchy small bowel gas most likely   due to ileus. XR CHEST PORTABLE   Final Result   No radiologic evidence of acute cardiopulmonary disease. CT CHEST ABDOMEN PELVIS W CONTRAST   Final Result   Probable acute diverticulitis or colitis left colon. No evidence of   perforation or abscess at this time. Chronic pancreatitis. Multiple compression fractures some of which are new since the previous exam.      Lingular ground-glass infiltrate. Recommend follow-up to resolution. Intra-atrial lipoma right atrium. Cardiac echo may be helpful. CT LUMBAR SPINE TRAUMA RECONSTRUCTION   Final Result   CT thoracic spine:      1. Decrease in height in T6 compared to prior study compatible with   worsening compression with subacute etiology and mild protrusion of the   dorsal aspect of T6. Narrowed T6-T7 neural foramina. 2.  Chronic superior height T8, T9, and T11 without interval change from   prior study. 3.  Mild prevertebral soft tissue prominence T6 without evidence of abscess   formation. CT lumbar spine:      1. No traumatic malalignment. 2.  Compression fracture superior L5 with subacute appearance. 3.  Calcifications in the abdomen incompletely included appearance suggesting   pancreatic calcifications. RECOMMENDATIONS:   Please reference CT chest, abdomen and pelvis of same day. CT THORACIC SPINE TRAUMA RECONSTRUCTION   Final Result   CT thoracic spine:      1. Decrease in height in T6 compared to prior study compatible with   worsening compression with subacute etiology and mild protrusion of the   dorsal aspect of T6. Narrowed T6-T7 neural foramina. 2.  Chronic superior height T8, T9, and T11 without interval change from   prior study.       3.  Mild prevertebral soft tissue prominence T6 without evidence of abscess   formation. CT lumbar spine:      1. No traumatic malalignment. 2.  Compression fracture superior L5 with subacute appearance. 3.  Calcifications in the abdomen incompletely included appearance suggesting   pancreatic calcifications. RECOMMENDATIONS:   Please reference CT chest, abdomen and pelvis of same day. XR THORACIC SPINE (2 VIEWS)    (Results Pending)   XR LUMBAR SPINE (2-3 VIEWS)    (Results Pending)       Thank you for the interesting evaluation. Further recommendations to follow. Frandy Jones MD  7/28/2021, 1:28 PM         Attending Note    IR placed drain in bella-colonic abscess. I have reviewed the above TECSS note(s) and I either performed the key elements of the medical history and physical exam or was present with the resident when the key elements of the medical history and physical exam were performed. I have discussed the findings, established the care plan and recommendations with Resident, TECSS RN, bedside nurse.     Frandy Jones MD  7/28/2021  1:29 PM

## 2021-07-28 NOTE — PROGRESS NOTES
Abscess was found in CT . Writer contacted internal med and ID. [id number] Gen surgery consulted (see orders)Assessed pt at bedside, labs drawn  Increased lactic writer contacted surgery, medicine, and ID,  200 Crit care evaluated pt at bedside no new orders. 1 L bolus given, as well as a second liter bolus (see MAR) . Urine output decreased, writer updated  bladder scanned with 121 mls retained. Pt is still c/o  Abdominal pain. 600 Crit care assessed at bedside.  Orders for abscess to be  drained

## 2021-07-28 NOTE — PROGRESS NOTES
Physical Therapy        Physical Therapy Cancel Note      DATE: 2021    NAME: Chris Rogers  MRN: [id number]   : 1970      Patient not seen this date for Physical Therapy due to:    Surgery/Procedure: Thoracentesis      Electronically signed by Huy Grant PT on 2021 at 11:35 AM

## 2021-07-28 NOTE — CARE COORDINATION
Transitional planning-call from Luz Maria Hyatt at [facility] intake. Received Ronna Mcdonald for IP Street. Explained to her that patient isn't ready.  Needs COVID test before coming

## 2021-07-28 NOTE — CONSULTS
General Surgery  Consult    PATIENT NAME: Raji Chatterjee  AGE: 45 y.o. MEDICAL RECORD NO. [id number]  DATE: 7/28/2021  SURGEON: Dr. Golden Crouch: Maria Fernanda Haddad MD    Patient evaluated at the request of  Dr. Christiano Tidwell  Reason for evaluation: Acute diverticulitis with new large abdominal abscess formation     Patient information was obtained from patient and past medical records. History/Exam limitations: mental status.     IMPRESSION:     Patient Active Problem List   Diagnosis    Acute bronchitis    Reflex sympathetic dystrophy of lower limb    Essential hypertension    Nicotine addiction    Psychophysiological insomnia    Anxiety    Alcoholic peripheral neuropathy (HCC)    Hypokalemia    Macrocytic anemia    Hypomagnesemia    Tobacco use    Ambulatory dysfunction    Seizure disorder ([address]    COPD with acute exacerbation (HCC)    Paresthesia of lower extremity    Acute respiratory failure with hypoxia (HCC)    Pancreatic cyst    Recurrent major depressive disorder (HCC)    Elevated CA 19-9 level    Perineal mass, female    Esophagitis candidal     Condyloma    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24, the patient underwent 2 surgical resections without known recurrence    Alcohol use disorder, severe, in early remission ([address]    Metabolic encephalopathy    AMAN (generalized anxiety disorder)    Major depressive disorder, recurrent severe without psychotic features ([address]    Esophageal dysphagia    Chronic peripheral neuropathic pain    History of alcohol abuse    History of petit-mal seizures    Intractable episodic tension-type headache    Personal history of astrocytoma    Multilevel spine pain    Chronic pain syndrome    Marijuana abuse    Sepsis ([address]    Closed fracture of fifth thoracic vertebra (HCC)    Diverticulitis of large intestine without perforation or abscess without bleeding    Elevated brain natriuretic peptide (BNP) level    Elevated alkaline phosphatase level    Chronic pancreatitis (HCC)    Erythema ab igne    Compression fracture of thoracic vertebra (HCC)    Compression of lumbar vertebra [facility])   65-year-old male who presents with sepsis likely source from left-sided intra-abdominal abscess. PLAN:   Due to patient's worsening clinical presentation and lactic acidosis of 8.7, highly recommend IR drainage of left intra-abdominal abscess as soon as possible to obtain source control. Continue IV antibiotics per infectious disease. Recommend holding steroids due to active infection. Recommend additional IV hydration for lactic acidosis and septic picture. Thank you for the consult. Please call/page us if you have any questions/concerns. We appreciate being involved in the care of your patient. HISTORY:   History of Chief Complaint:    Shabnam Bryant is a 45 y.o. female with history of Astrocytoma s/p resection, COPD, diverticulitis and chronic pancreatitis who presents with worsening abdominal pain. Due to patient's mental status, she unable to participate with examination. Per previous documentation, patient presented to the emergency department on 7/21/2021 with generalized fatigue, headache and back pain. She was initiated on treatment for presumed aspiration pneumonia including IV antibiotics. She was also treated for acute COPD exacerbation and started on a steroid taper. CT chest abdomen pelvis on admission showed possible left diverticulitis/colitis with minimal abscess present. Over the course of her hospital stay, patient developed worsening mental status and a second CT scan was done, which showed a large complex fluid-filled collection in the left upper quadrant with concern for abscess. She was also found to have diffuse urinary bladder wall thickening and bilateral pleural effusions more severe on the left. Patient was found to have a lactic acidosis and a 8.7 and a leukocytosis of 19.3.      Past Medical History   has a past medical history of Acute respiratory failure with hypoxia ([address] Alcohol withdrawal syndrome, with delirium ([address] Alcoholism ([address] Anemia, 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24, the patient underwent 2 surgical resections without known recurrence, Closed fracture of lateral portion of left tibial plateau, COPD (chronic obstructive pulmonary disease) ([address] Depression, Dysphagia, GI bleed, Hypertension, Memory loss, Oxygen dependent, Pain, joint, ankle and foot, Pancreatic lesion, Peripheral neuropathy, Seizures ([address] Tension headache, Under care of team, Under care of team, Under care of team, Under care of team, Under care of team, and Wellness examination. Past Surgical History   has a past surgical history that includes Hysterectomy (2003); Brain tumor excision (1989); fracture surgery (Left, 7-3-13); fracture surgery (Right); Upper gastrointestinal endoscopy (N/A, 10/22/2020); Colonoscopy (N/A, 10/22/2020); Endoscopic ultrasonography, GI (N/A, 12/9/2020); Upper gastrointestinal endoscopy (N/A, 4/5/2021); and Hand surgery. Medications  Prior to Admission medications    Medication Sig Start Date End Date Taking? Authorizing Provider   acamprosate (CAMPRAL) 333 MG tablet Take 2 tablets by mouth 3 times daily 7/16/21 8/15/21  LOI Zuniga NP   sertraline (ZOLOFT) 50 MG tablet Take 3 tablets by mouth daily for 7 days, THEN 2 tablets daily for 7 days, THEN 1 tablet daily for 7 days. 7/16/21 8/6/21  LOI Zuniga NP   FLUoxetine (PROZAC) 20 MG tablet Take 0.5 tablets by mouth daily for 7 days, THEN 1 tablet daily for 23 days.  7/16/21 8/15/21  LOI Zuniga NP   traZODone (DESYREL) 50 MG tablet TAKE 1 AND 1/2 TABLET BY MOUTH ONCE NIGHTLY AS NEEDED FOR SLEEP 7/16/21   LOI Zuniga NP   thermotabs (MEDI-LYTE) TABS tablet Take 1 tablet by mouth daily for 3 days 7/9/21 7/12/21  Vishal Thompson, APRN - NP   carBAMazepine (TEGRETOL XR) 200 MG extended MD   vitamin B-1 (THIAMINE) 100 MG tablet Take 1 tablet by mouth daily 7/12/19   Ludivina Hong MD   vitamin D (ERGOCALCIFEROL) 70159 units CAPS capsule Take 1 capsule by mouth once a week 6/19/19   Ludivina Hong MD   folic acid (FOLVITE) 1 MG tablet Take 1 tablet by mouth daily 6/19/19   Ludivina Hong MD    Scheduled Meds:   predniSONE  40 mg Oral Daily    Followed by   Earnstine Laser ON 7/30/2021] predniSONE  30 mg Oral Daily    Followed by   Earnstine Laser ON 8/2/2021] predniSONE  20 mg Oral Daily    Followed by   Earnstine Laser ON 8/5/2021] predniSONE  10 mg Oral Daily    lidocaine  1 patch Transdermal Daily    spironolactone  50 mg Oral Daily    ipratropium-albuterol  1 ampule Inhalation 4x daily    polyethylene glycol  17 g Oral BID    carBAMazepine  200 mg Oral TID    topiramate  50 mg Oral BID    amLODIPine  5 mg Oral Daily    baclofen  10 mg Oral TID    budesonide-formoterol  2 puff Inhalation BID    busPIRone  15 mg Oral TID    ferrous sulfate  325 mg Oral BID    folic acid  1 mg Oral Daily    potassium chloride  20 mEq Oral Daily with breakfast    pregabalin  200 mg Oral TID    rOPINIRole  1 mg Oral Nightly    sodium chloride flush  5-40 mL Intravenous 2 times per day    enoxaparin  40 mg Subcutaneous Daily    piperacillin-tazobactam  3,375 mg Intravenous Q8H     Continuous Infusions:   lactated ringers 100 mL/hr at 07/27/21 2224    sodium chloride 25 mL (07/24/21 1803)     PRN Meds:.potassium chloride **OR** potassium alternative oral replacement **OR** potassium chloride, LORazepam, hyoscyamine, hydrOXYzine, [Held by provider] fentanNYL, bisacodyl, albuterol, traZODone, sodium chloride flush, sodium chloride, acetaminophen **OR** acetaminophen, [Held by provider] oxyCODONE-acetaminophen **OR** [DISCONTINUED] oxyCODONE-acetaminophen, melatonin  Allergies  has No Known Allergies.   Family History  family history includes Cancer in her maternal grandmother; Esophageal Cancer in her maternal aunt; Heart Disease in her paternal grandmother; Other in her father. Social History   reports that she has been smoking cigarettes. She has a 15.00 pack-year smoking history. She has never used smokeless tobacco.   reports previous alcohol use. reports current drug use. Frequency: 2.00 times per week. Drug: Marijuana. Review of Systems  Unable to be obtained due to patient's altered mental status    PHYSICAL:   VITALS:  height is 5' 5\" (1.651 m) and weight is 143 lb 4.8 oz (65 kg). Her axillary temperature is 98 °F (36.7 °C). Her blood pressure is 142/99 (abnormal) and her pulse is 108. Her respiration is 26 and oxygen saturation is 91%. CONSTITUTIONAL: Confused, repeating single line of thought to multiple times, appears unwell  HEENT: Head is normocephalic, atraumatic. NECK: Soft, trachea midline and straight  LUNGS: Symmetrical rise and fall of chest, no acute respiratory distress. CARDIOVASCULAR:   Regular rate and rhythm   ABDOMEN: Soft, distended, diffusely tender to palpation, more severe in the left upper and lower quadrants extending to the suprapubic area, nonperitoneal  NEUROLOGIC: Spontaneously moving all extremities  EXTREMITIES: no cyanosis, clubbing or edema    LABS:     Recent Labs     07/25/21  0701 07/26/21  0623 07/27/21  0511   WBC 17.7* 19.3* 19.4*   HGB 12.2 11.5* 11.9   HCT 37.6 35.5* 37.9    391 391    139 141   K 3.8 3.5* 3.2*   CL 98 96* 99   CO2 28 27 26   BUN 18 18 18   CREATININE 0.75 0.80 0.68   MG  --   --  1.6   CALCIUM 9.1 8.4* 8.7     No results for input(s): ALKPHOS, ALT, AST, BILITOT, BILIDIR, LABALBU, AMYLASE, LIPASE in the last 72 hours.   CBC with Differential:    Lab Results   Component Value Date    WBC 19.4 07/27/2021    RBC 4.06 07/27/2021    RBC 4.16 04/30/2012    HGB 11.9 07/27/2021    HCT 37.9 07/27/2021     07/27/2021     04/30/2012    MCV 93.3 07/27/2021    MCH 29.3 07/27/2021    MCHC 31.4 07/27/2021    RDW 13.3 07/27/2021 LYMPHOPCT 6 07/27/2021    MONOPCT 7 07/27/2021    BASOPCT 0 07/27/2021    MONOSABS 1.35 07/27/2021    LYMPHSABS 1.13 07/27/2021    EOSABS 0.03 07/27/2021    BASOSABS 0.06 07/27/2021    DIFFTYPE NOT REPORTED 07/27/2021     BMP:    Lab Results   Component Value Date     07/27/2021    K 3.2 07/27/2021    CL 99 07/27/2021    CO2 26 07/27/2021    BUN 18 07/27/2021    LABALBU 2.7 07/24/2021    CREATININE 0.68 07/27/2021    CALCIUM 8.7 07/27/2021    GFRAA >60 07/27/2021    LABGLOM >60 07/27/2021    GLUCOSE 160 07/27/2021    GLUCOSE 92 04/30/2012     U/A:    Lab Results   Component Value Date    NITRITE neg 12/18/2018    COLORU YELLOW 07/21/2021    PROTEINU 3+ 07/21/2021    PHUR 6.0 07/21/2021    WBCUA 20 TO 50 07/21/2021    RBCUA 5 TO 10 07/21/2021    MUCUS NOT REPORTED 07/21/2021    TRICHOMONAS NOT REPORTED 07/21/2021    YEAST NOT REPORTED 07/21/2021    BACTERIA FEW 07/21/2021    CLARITYU cloudy 12/18/2018    SPECGRAV 1.089 07/21/2021    LEUKOCYTESUR NEGATIVE 07/21/2021    UROBILINOGEN Normal 07/21/2021    BILIRUBINUR NEGATIVE 07/21/2021    BILIRUBINUR moderate 12/18/2018    BLOODU neg 12/18/2018    GLUCOSEU NEGATIVE 07/21/2021    AMORPHOUS NOT REPORTED 07/21/2021     AMYLASE:    Lab Results   Component Value Date    AMYLASE 105 07/28/2021     LIPASE:    Lab Results   Component Value Date    LIPASE 101 07/28/2021     Lactic acid 8.7    RADIOLOGY:     CT ABDOMEN PELVIS W IV CONTRAST Additional Contrast? Radiologist Recommendation   Final Result   1. Large complex fluid collection in the left upper quadrant, concerning for   abscess. This is likely related to the previously described colitis. 2. Consolidation and ground-glass opacity in both lower lobes with left   pleural effusion, concerning for multifocal pneumonia. 3. Diffuse urinary bladder wall thickening. This could be due to   underdistention, but correlation for any signs or symptoms of cystitis is   recommended. 4. Chronic pancreatitis.    5. Nonobstructing left nephrolithiasis. XR CHEST (SINGLE VIEW FRONTAL)   Final Result   Interval improvement in still mild interstitial edema with bilateral   asymmetric, left greater than right, pleural effusions         CT HEAD WO CONTRAST   Final Result   No acute intracranial abnormality. Suboccipital craniectomy. XR CHEST PORTABLE   Final Result   1. Increasing vascular congestion and left perihilar opacity. 2.  Pleural effusions and basilar opacities are otherwise without significant   change. XR ACUTE ABD SERIES CHEST 1 VW   Final Result   Bilateral airspace disease and pleural effusions. Findings in the upper   lobes appear increased compared to prior, left greater than right. Findings   may be related to asymmetric edema with superimposed pneumonia not excluded. Gas and stool are seen throughout nondilated bowel loops with few nonspecific   air-fluid levels in the right lower abdomen, likely in small bowel. Findings   may be physiologic or related to mild ileus. No evidence of obstruction or   free air. XR LUMBAR SPINE (2-3 VIEWS)   Final Result   Lumbar spine: Superior endplate fracture L5 which appears acute to subacute. No subluxation. Other vertebra well maintained. Spine significant compression deformity T6 with there are endplate loss in   height T8, T9 and T10. No subluxation. Mild levo scoliotic curvature. Osteopenia complicates assessment. Pedicles are intact. Low lung volumes   complicate assessment. There is suggestion of pleural effusions. XR THORACIC SPINE (3 VIEWS)   Final Result   Lumbar spine: Superior endplate fracture L5 which appears acute to subacute. No subluxation. Other vertebra well maintained. Spine significant compression deformity T6 with there are endplate loss in   height T8, T9 and T10. No subluxation. Mild levo scoliotic curvature. Osteopenia complicates assessment. Pedicles are intact.   Low lung volumes   complicate assessment. There is suggestion of pleural effusions. MRI THORACIC SPINE WO CONTRAST   Final Result   1. Limited examination. 2. Compression deformities involving T6, T8, T9 and T11. These are likely   chronic in nature. Diffusely decreased T1 and T2 marrow signal within the T6   vertebral body compatible with the sclerosis seen on the prior CT. 3. There is minimal bone marrow edema involving the left lateral elements of   T6 and T7. Unclear whether this is degenerative in nature or perhaps   sequelae of recent or remote trauma. 4. No significant spinal canal stenosis of the thoracic spine. 5. Moderate bilateral neural foraminal narrowing at T6-T7.   6. Bilateral pleural effusions, left greater than right. MRI LUMBAR SPINE WO CONTRAST   Final Result   1. Acute compression deformity of the superior endplate of L5 with   approximately 35% loss of height. No significant bulging of the posterior   cortex. 2. No significant spinal canal stenosis of the lumbar spine. 3. Neural foraminal narrowing at L3-L4 through L5-S1.   4. Minimal retrolisthesis at L5-S1. XR CHEST PORTABLE   Final Result   Interval development of bibasilar infiltrates and small pleural effusions   which could represent dependent edema, pneumonia or aspiration. XR ABDOMEN (KUB) (SINGLE AP VIEW)   Final Result   Mild dilation of the right hemicolon and patchy small bowel gas most likely   due to ileus. XR CHEST PORTABLE   Final Result   No radiologic evidence of acute cardiopulmonary disease. CT CHEST ABDOMEN PELVIS W CONTRAST   Final Result   Probable acute diverticulitis or colitis left colon. No evidence of   perforation or abscess at this time. Chronic pancreatitis. Multiple compression fractures some of which are new since the previous exam.      Lingular ground-glass infiltrate. Recommend follow-up to resolution.       Intra-atrial lipoma right atrium. Cardiac echo may be helpful. CT LUMBAR SPINE TRAUMA RECONSTRUCTION   Final Result   CT thoracic spine:      1. Decrease in height in T6 compared to prior study compatible with   worsening compression with subacute etiology and mild protrusion of the   dorsal aspect of T6. Narrowed T6-T7 neural foramina. 2.  Chronic superior height T8, T9, and T11 without interval change from   prior study. 3.  Mild prevertebral soft tissue prominence T6 without evidence of abscess   formation. CT lumbar spine:      1. No traumatic malalignment. 2.  Compression fracture superior L5 with subacute appearance. 3.  Calcifications in the abdomen incompletely included appearance suggesting   pancreatic calcifications. RECOMMENDATIONS:   Please reference CT chest, abdomen and pelvis of same day. CT THORACIC SPINE TRAUMA RECONSTRUCTION   Final Result   CT thoracic spine:      1. Decrease in height in T6 compared to prior study compatible with   worsening compression with subacute etiology and mild protrusion of the   dorsal aspect of T6. Narrowed T6-T7 neural foramina. 2.  Chronic superior height T8, T9, and T11 without interval change from   prior study. 3.  Mild prevertebral soft tissue prominence T6 without evidence of abscess   formation. CT lumbar spine:      1. No traumatic malalignment. 2.  Compression fracture superior L5 with subacute appearance. 3.  Calcifications in the abdomen incompletely included appearance suggesting   pancreatic calcifications. RECOMMENDATIONS:   Please reference CT chest, abdomen and pelvis of same day. XR THORACIC SPINE (2 VIEWS)    (Results Pending)   XR LUMBAR SPINE (2-3 VIEWS)    (Results Pending)   IR GUIDED THORACENTESIS PLEURAL    (Results Pending)   IR ABSCESS DRAINAGE PERC    (Results Pending)       Thank you for the interesting evaluation. Further recommendations to follow.     Rafat Montoya MD  7/28/2021, 1:01 AM

## 2021-07-28 NOTE — PROGRESS NOTES
Comprehensive Nutrition Assessment    Type and Reason for Visit:  Initial, RD Nutrition Re-Screen/LOS    Nutrition Recommendations/Plan:   - Continue NPO  - Start nutrition as able    Nutrition Assessment:  Patient seen for length of stay. Has been NPO since yesterday evening. Prior, she was consuming 1-75% of meals, per EHR review. Malnutrition Assessment:  Malnutrition Status:  Insufficient data    Context:  Acute Illness     Findings of the 6 clinical characteristics of malnutrition:  Energy Intake:  1 - 75% or less of estimated energy requirements for 7 or more days  Weight Loss:  No significant weight loss     Body Fat Loss:  Unable to assess     Muscle Mass Loss:  Unable to assess    Fluid Accumulation:  1 - Mild- Extremities   Strength:  Not Performed    Estimated Daily Nutrient Needs:  Energy (kcal):  1750-950 kcal/d (27-30 kcal/kg); Weight Used for Energy Requirements:  Current     Protein (g):  80-90 g protein/d (1.2-1.4 g/kg); Weight Used for Protein Requirements:  Current        Fluid (ml/day):  5663-8416 mL fluid/d or per MD; Method Used for Fluid Requirements:  ml/Kg (25-30 mL)      Nutrition Related Findings:  Labs: Glucose 200 (H). Meds: Folic Acid, Ferrous Sulfate, KCl, Spironolactone. Last BM 7/26. +Abdominal distention. +1 BLE edema. Wounds:  None       Current Nutrition Therapies:    Diet NPO    Anthropometric Measures:  · Height: 5' 5\" (165.1 cm)  · Current Body Weight: 143 lb 4.8 oz (65 kg)   · Admission Body Weight: 140 lb (63.5 kg)    · Usual Body Weight: 133 lb 9.6 oz (60.6 kg) (3/31/2021)     · Ideal Body Weight: 125 lbs; % Ideal Body Weight 114.6 %   · BMI: 23.8  · Adjusted Body Weight:  No Adjustment   · BMI Categories: Normal Weight (BMI 18.5-24. 9)       Nutrition Diagnosis:   · Inadequate oral intake related to current medical condition as evidenced by NPO or clear liquid status due to medical condition    Nutrition Interventions:   Food and/or Nutrient Delivery: Continue NPO (Resume PO diet as able)  Nutrition Education/Counseling:  No recommendation at this time   Coordination of Nutrition Care:  Continue to monitor while inpatient    Goals:  Intake able to meet >75% of estimated nutrition needs       Nutrition Monitoring and Evaluation:   Behavioral-Environmental Outcomes:  None Identified   Food/Nutrient Intake Outcomes:  Diet Advancement/Tolerance  Physical Signs/Symptoms Outcomes:  Biochemical Data, Constipation, Fluid Status or Edema, Nutrition Focused Physical Findings, Skin, Weight     Discharge Planning:     Too soon to determine     Electronically signed by Yris Warren RD, LD on 7/28/21 at 3:25 PM EDT    Contact: [phone]

## 2021-07-28 NOTE — PLAN OF CARE
CT AP shows large left abd collection and left pleural effusion -  Disc w med - getting GS and IR to drain L pleural effusion and abd collection.   Studies to sent were ordered  Continue marcelino Arce MD. Infectious Diseases

## 2021-07-28 NOTE — PROGRESS NOTES
IR consulted. Reviewed patient chart and imaging. IR will see patient first thing in AM to do procedure.

## 2021-07-28 NOTE — PROGRESS NOTES
PULMONARY & CRITICAL CARE MEDICINE PROGRESS NOTE     Patient:  Dedrick Rivero  MRN: [id number]  Admit date: 2021  Primary Care Physician: Agustin Sal MD  Consulting Physician: Michaela Tompkins MD  CODE Status: Full Code  LOS: 7     SUBJECTIVE     CHIEF COMPLAINT/REASON FOR INITIAL CONSULT: Acute respiratory failuree respiratory failure  BRIEF HOSPITAL COURSE:   The patient is a 45 y.o. female admitted with abdominal and back pain. She was found to have thoracic spine fracture. Her CT abdomen done on  showed a complex fluid collection 9.3 x 7.3 x 5.4 cm in the left upper quadrant and left lower lobe consolidation/pleural effusion. INTERVAL HISTORY:  21  Patient is s/p placement of left upper quadrant  She also underwent left thoracentesis with removal of 450 mL of yellow fluid  Pleural fluid shows exudative effusion with pH of 7.5, LDH 2125, protein 3.6 amylase 4100, WBC 4648, 86% neutrophils, 5% lymphocytes  Patient is currently on O2 Flow Rate (L/min)  Av.5 L/min  Min: 4 L/min  Max: 15 L/min  She was on BiPAP overnight  T-max is placement of 101.1, white count is 17,000  Patient is on Zosyn    REVIEW OF SYSTEMS:  Review of Systems   Constitutional: Positive for fatigue. Negative for appetite change, chills, fever and unexpected weight change. HENT: Negative for congestion, postnasal drip, sore throat and trouble swallowing. Eyes: Negative for visual disturbance. Respiratory: Positive for cough and shortness of breath. Negative for wheezing. Cardiovascular: Negative for chest pain, palpitations and leg swelling. Gastrointestinal: Positive for abdominal pain. Negative for constipation, diarrhea, nausea and vomiting. Genitourinary: Negative for difficulty urinating, dysuria and frequency. Musculoskeletal: Negative for arthralgias and joint swelling. Skin: Negative for rash. Allergic/Immunologic: Negative for immunocompromised state.    Neurological: Positive for weakness. Negative for dizziness, speech difficulty and headaches. Hematological: Negative for adenopathy. Psychiatric/Behavioral: Positive for confusion. Negative for behavioral problems and sleep disturbance.      OBJECTIVE     PaO2/FiO2 RATIO:  Recent Labs     21  1538   POCPO2 71.9*      FiO2 : (S) 50 %     VITAL SIGNS:   LAST:  /61   Pulse 98   Temp 99.5 °F (37.5 °C) (Oral)   Resp 18   Ht 5' 5\" (1.651 m)   Wt 143 lb 4.8 oz (65 kg)   SpO2 92%   BMI 23.85 kg/m²   8-24 HR RANGE:  TEMP Temp  Av.9 °F (37.2 °C)  Min: 98 °F (36.7 °C)  Max: 101.1 °F (38.5 °C)   BP Systolic (29PMI), DTZ:879 , Min:104 , ANDERSON:620      Diastolic (91JNQ), AZB:82, Min:61, Max:99     PULSE Pulse  Av.3  Min: 98  Max: 117   RR Resp  Av.7  Min: 18  Max: 29   O2 SAT SpO2  Av.7 %  Min: 88 %  Max: 95 %   OXYGEN DELIVERY O2 Flow Rate (L/min)  Av.5 L/min  Min: 4 L/min  Max: 15 L/min        SYSTEMIC EXAMINATION:   General appearance - Ill-appearing, mild  respiratory distress  Mental status - awake & alert, follows commands  Eyes - pupils equal and reactive, sclera anicteric  Mouth - mucous membranes moist  Neck - supple, no significant adenopathy, carotids upstroke normal bilaterally, no bruits  Chest - Breath sounds bilaterally were dimnished to auscultation at bases  Heart - normal rate, regular rhythm, normal S1, S2, no murmurs, rubs, clicks or gallops  Abdomen - soft,, mild distention, left upper quadrant pain, no masses or organomegaly  Neurological - non-focal  Extremities - peripheral pulses normal, no pedal edema, no clubbing or cyanosis  Skin - normal coloration and turgor, no rashes, no suspicious skin lesions noted     DATA REVIEW     Medications: Current Inpatient  Scheduled Meds:   lidocaine  1 patch Transdermal Daily    [Held by provider] spironolactone  50 mg Oral Daily    ipratropium-albuterol  1 ampule Inhalation 4x daily    polyethylene glycol  17 g Oral BID    carBAMazepine  200 mg Oral TID    topiramate  50 mg Oral BID    amLODIPine  5 mg Oral Daily    baclofen  10 mg Oral TID    budesonide-formoterol  2 puff Inhalation BID    busPIRone  15 mg Oral TID    ferrous sulfate  325 mg Oral BID    folic acid  1 mg Oral Daily    potassium chloride  20 mEq Oral Daily with breakfast    pregabalin  200 mg Oral TID    rOPINIRole  1 mg Oral Nightly    sodium chloride flush  5-40 mL Intravenous 2 times per day    enoxaparin  40 mg Subcutaneous Daily    piperacillin-tazobactam  3,375 mg Intravenous Q8H     Continuous Infusions:   lactated ringers 100 mL/hr at 07/27/21 2224    sodium chloride 25 mL (07/24/21 1803)       INPUT/OUTPUT:  In: 2170 [P.O.:120; I.V.:4264]  Out: 973 [Urine:900; Drains:73]  Date 07/28/21 0000 - 07/28/21 2359   Shift 3212-6533 0107-5239 5334-5930 24 Hour Total   INTAKE   P.O.(mL/kg/hr)   120 120   I. V.(mL/kg) 6029(99.9)  2197(08.8) 5017(85.8)   Shift Total(mL/kg) 0751(99.7)  9628(14.2) 6332(92.0)   OUTPUT   Urine(mL/kg/hr) 600(1.2)  300 900   Drains(mL/kg)  13(0.2) 60(0.9) 73(1.1)   Shift Total(mL/kg) 600(9.2) 13(0.2) 360(5.5) 973(15)   Weight (kg) 65 65 65 65        LABS:  ABGs:   Recent Labs     07/26/21  1538   POCPH 7.467*   POCPCO2 44.7   POCPO2 71.9*   POCHCO3 32.3*   EKGD9IBS 95     CBC:   Recent Labs     07/26/21  0623 07/27/21  0511 07/28/21  0453 07/28/21  1706   WBC 19.3* 19.4* 17.0*  --    HGB 11.5* 11.9 8.5* 9.8*   HCT 35.5* 37.9 27.9* 31.2*   MCV 91.0 93.3 96.5  --     391 307  --    LYMPHOPCT 8* 6* 12*  --    RBC 3.90* 4.06 2.89*  --    MCH 29.5 29.3 29.4  --    MCHC 32.4 31.4 30.5  --    RDW 13.5 13.3 13.3  --      CRP:   Recent Labs     07/26/21  0623 07/27/21  1230   .3* 291.7*     LDH:   No results for input(s): LDH in the last 72 hours.   BMP:   Recent Labs     07/26/21  0623 07/27/21  0511 07/28/21  0453    141 135   K 3.5* 3.2* 3.7   CL 96* 99 101   CO2 27 26 15*   BUN 18 18 11   CREATININE 0.80 0.68 0.34*   GLUCOSE 114* 160* 200*     Liver Function Test:   No results for input(s): PROT, LABALBU, ALT, AST, GGT, ALKPHOS, BILITOT in the last 72 hours. Coagulation Profile:   Recent Labs     07/28/21  0159 07/28/21  0453   INR 1.1 1.1   PROTIME 11.9 11.9   APTT  --  27.6     D-Dimer:  No results for input(s): DDIMER in the last 72 hours. Lactic Acid:  No results for input(s): LACTA in the last 72 hours. Cardiac Enzymes:  No results for input(s): CKTOTAL, CKMB, CKMBINDEX, TROPONINI in the last 72 hours. Invalid input(s): TROPONIN, HSTROP  BNP/ProBNP:   No results for input(s): BNP, PROBNP in the last 72 hours. Triglycerides:  No results for input(s): TRIG in the last 72 hours. Microbiology:  Urine Culture:  No components found for: CURINE  Blood Culture:  No components found for: CBLOOD, CFUNGUSBL  Sputum Culture:  No components found for: [place] 11     07/28/21  1151 07/28/21  1151 07/28/21  1152   SPECDESC . PLEURAL FLUID   < > . PLEURAL FLUID   SPECIAL NOT REPORTED  --   --    CULTURE PENDING  --   --     < > = values in this interval not displayed. Recent Labs     07/28/21  1136 07/28/21  1136 07/28/21  1140 07/28/21  1151 07/28/21  1152   SPECDESC . ASPIRATE ABDOMINAL ABSCESS   < > .ASPIRATE . ABSCESS . PLEURAL FLUID . PLEURAL FLUID   SPECIAL NOT REPORTED  --  NOT REPORTED NOT REPORTED  --    CULTURE DUPLICATE ORDER  --  PENDING PENDING  --     < > = values in this interval not displayed. Pathology:    Radiology Reports:  IR GUIDED THORACENTESIS PLEURAL   Final Result   Successful ultrasound guided thoracentesis. CT ABSCESS DRAINAGE   Final Result   Successful percutaneous ultrasound and CT guided placement of 8 [place] left   upper quadrant drainage catheter. New high attenuation material within the multi loculated left upper quadrant   fluid collection suggests leakage from the adjacent abnormal colonic segment   indicating micro perforation.       The above findings were called by Dr. Anmol Copeland Samara Hall MD to Dr. Alfonso Espinoza On 7/28/2021 at 12:32. CT ABDOMEN PELVIS W IV CONTRAST Additional Contrast? Radiologist Recommendation   Final Result   1. Large complex fluid collection in the left upper quadrant, concerning for   abscess. This is likely related to the previously described colitis. 2. Consolidation and ground-glass opacity in both lower lobes with left   pleural effusion, concerning for multifocal pneumonia. 3. Diffuse urinary bladder wall thickening. This could be due to   underdistention, but correlation for any signs or symptoms of cystitis is   recommended. 4. Chronic pancreatitis. 5. Nonobstructing left nephrolithiasis. XR CHEST (SINGLE VIEW FRONTAL)   Final Result   Interval improvement in still mild interstitial edema with bilateral   asymmetric, left greater than right, pleural effusions         CT HEAD WO CONTRAST   Final Result   No acute intracranial abnormality. Suboccipital craniectomy. XR CHEST PORTABLE   Final Result   1. Increasing vascular congestion and left perihilar opacity. 2.  Pleural effusions and basilar opacities are otherwise without significant   change. XR ACUTE ABD SERIES CHEST 1 VW   Final Result   Bilateral airspace disease and pleural effusions. Findings in the upper   lobes appear increased compared to prior, left greater than right. Findings   may be related to asymmetric edema with superimposed pneumonia not excluded. Gas and stool are seen throughout nondilated bowel loops with few nonspecific   air-fluid levels in the right lower abdomen, likely in small bowel. Findings   may be physiologic or related to mild ileus. No evidence of obstruction or   free air. XR LUMBAR SPINE (2-3 VIEWS)   Final Result   Lumbar spine: Superior endplate fracture L5 which appears acute to subacute. No subluxation. Other vertebra well maintained.       Spine significant compression deformity T6 with there are endplate loss in   height T8, T9 and T10. No subluxation. Mild levo scoliotic curvature. Osteopenia complicates assessment. Pedicles are intact. Low lung volumes   complicate assessment. There is suggestion of pleural effusions. XR THORACIC SPINE (3 VIEWS)   Final Result   Lumbar spine: Superior endplate fracture L5 which appears acute to subacute. No subluxation. Other vertebra well maintained. Spine significant compression deformity T6 with there are endplate loss in   height T8, T9 and T10. No subluxation. Mild levo scoliotic curvature. Osteopenia complicates assessment. Pedicles are intact. Low lung volumes   complicate assessment. There is suggestion of pleural effusions. MRI THORACIC SPINE WO CONTRAST   Final Result   1. Limited examination. 2. Compression deformities involving T6, T8, T9 and T11. These are likely   chronic in nature. Diffusely decreased T1 and T2 marrow signal within the T6   vertebral body compatible with the sclerosis seen on the prior CT. 3. There is minimal bone marrow edema involving the left lateral elements of   T6 and T7. Unclear whether this is degenerative in nature or perhaps   sequelae of recent or remote trauma. 4. No significant spinal canal stenosis of the thoracic spine. 5. Moderate bilateral neural foraminal narrowing at T6-T7.   6. Bilateral pleural effusions, left greater than right. MRI LUMBAR SPINE WO CONTRAST   Final Result   1. Acute compression deformity of the superior endplate of L5 with   approximately 35% loss of height. No significant bulging of the posterior   cortex. 2. No significant spinal canal stenosis of the lumbar spine. 3. Neural foraminal narrowing at L3-L4 through L5-S1.   4. Minimal retrolisthesis at L5-S1. XR CHEST PORTABLE   Final Result   Interval development of bibasilar infiltrates and small pleural effusions   which could represent dependent edema, pneumonia or aspiration. lumbar spine:      1. No traumatic malalignment. 2.  Compression fracture superior L5 with subacute appearance. 3.  Calcifications in the abdomen incompletely included appearance suggesting   pancreatic calcifications. RECOMMENDATIONS:   Please reference CT chest, abdomen and pelvis of same day. XR THORACIC SPINE (2 VIEWS)    (Results Pending)   XR LUMBAR SPINE (2-3 VIEWS)    (Results Pending)        Echocardiogram:   Results for orders placed during the hospital encounter of 07/21/21    ECHO Complete 2D W Doppler W Color    Summary  Left ventricle is normal in size. Global left ventricular systolic function  is difficult to assess due to heart rate but appears normal. Calculated  ejection fraction 70% by Guadarrama's method. Visually estimated EF 60-65%. Left atrium is normal in size. Lipomatous atrial septum. No shunt seen by color Doppler. Normal right ventricular size and function. No significant valvular regurgitation or stenosis seen. ASSESSMENT AND PLAN     Assessment:    // Acute hypoxic respiratory failure  // Severe sepsis  // Acute metabolic encephalopathy  // Intra-abdominal abscess, s/p IR guided drainage  // Left pleural effusion/compressive atelectasis, s/p thoracentesis, pleural fluid exudative, with a markedly elevated amylase levels  // COPD, severity to be determined  // Leukocytosis  // Compression fracture of thoracic vertebrae  // Seizure disorder  // Essential hypertension  // Hyperlipidemia  // Chronic pancreatitis  // Alcohol abuse  // Tobacco abuse    Plan:    I personally interviewed/examined the patient; reviewed interval history, interpreted all available radiographic and laboratory data at the time of service.     Patient is hemodynamically stable and is currently saturating well on nasal cannula  Continue nocturnal and as needed BiPAP  Continue supplemental oxygen to keep oxygen saturation >90%  Encourage incentive spirometry  Continue pulmonary toilet, aspiration precautions and bronchodilators  Monitor I/O, electrolytes with a goal of even/negative fluid balance  Monitor output from left upper quadrant drain  Stress ulcer prophylaxis  Chemical DVT prophylaxis as per protocol  Antimicrobials reviewed; continue Zosyn as per ID recommendation, follow-up culture results  Okay to discontinue prednisone  Skin/wound care reviewed with the nursing staff  Physical/occupational therapy    I would like to thank you for allowing me to participate in the care of this patient. Please feel free to call with any further questions or concerns. Gavi Puga MD  Pulmonary and Critical Care Medicine           7/28/2021     Please note that this chart was generated using voice recognition Dragon dictation software. Although every effort was made to ensure the accuracy of this automated transcription, some errors in transcription may have occurred.

## 2021-07-28 NOTE — PROGRESS NOTES
[facility] [employer]  Occupational Therapy Not Seen Note    DATE: 2021    NAME: Paris Hahn  MRN: [id number]   : 1970      Patient not seen this date for Occupational Therapy due to:    Surgery/Procedure: Thoracentesis        Electronically signed by NATALIE Fitzgerald on 2021 at 12:04 PM

## 2021-07-29 ENCOUNTER — APPOINTMENT (OUTPATIENT)
Dept: GENERAL RADIOLOGY | Age: 51
DRG: 720 | End: 2021-07-29
Payer: MEDICARE

## 2021-07-29 PROBLEM — J91.8 PARAPNEUMONIC EFFUSION: Status: ACTIVE | Noted: 2021-07-29

## 2021-07-29 PROBLEM — J18.9 PARAPNEUMONIC EFFUSION: Status: ACTIVE | Noted: 2021-07-29

## 2021-07-29 LAB
ABSOLUTE EOS #: 0.21 K/UL (ref 0–0.4)
ABSOLUTE IMMATURE GRANULOCYTE: 0.62 K/UL (ref 0–0.3)
ABSOLUTE LYMPH #: 2.91 K/UL (ref 1–4.8)
ABSOLUTE MONO #: 1.25 K/UL (ref 0.1–0.8)
ANION GAP SERPL CALCULATED.3IONS-SCNC: 11 MMOL/L (ref 9–17)
APPEARANCE FLUID: NORMAL
BASO FLUID: NORMAL %
BASOPHILS # BLD: 0 % (ref 0–2)
BASOPHILS ABSOLUTE: 0 K/UL (ref 0–0.2)
BUN BLDV-MCNC: 9 MG/DL (ref 6–20)
BUN/CREAT BLD: ABNORMAL (ref 9–20)
CALCIUM SERPL-MCNC: 8.5 MG/DL (ref 8.6–10.4)
CHLORIDE BLD-SCNC: 104 MMOL/L (ref 98–107)
CO2: 23 MMOL/L (ref 20–31)
COLOR FLUID: NORMAL
CREAT SERPL-MCNC: 0.53 MG/DL (ref 0.5–0.9)
DIFFERENTIAL TYPE: ABNORMAL
EKG ATRIAL RATE: 85 BPM
EKG P AXIS: 48 DEGREES
EKG P-R INTERVAL: 236 MS
EKG Q-T INTERVAL: 372 MS
EKG QRS DURATION: 90 MS
EKG QTC CALCULATION (BAZETT): 442 MS
EKG R AXIS: 38 DEGREES
EKG T AXIS: 57 DEGREES
EKG VENTRICULAR RATE: 85 BPM
EOSINOPHIL FLUID: NORMAL %
EOSINOPHILS RELATIVE PERCENT: 1 % (ref 1–4)
FLUID DIFF COMMENT: NORMAL
FLUID LIPASE: NORMAL U/L
GFR AFRICAN AMERICAN: >60 ML/MIN
GFR NON-AFRICAN AMERICAN: >60 ML/MIN
GFR SERPL CREATININE-BSD FRML MDRD: ABNORMAL ML/MIN/{1.73_M2}
GFR SERPL CREATININE-BSD FRML MDRD: ABNORMAL ML/MIN/{1.73_M2}
GLUCOSE BLD-MCNC: 89 MG/DL (ref 70–99)
HCT VFR BLD CALC: 30.5 % (ref 36.3–47.1)
HEMOGLOBIN: 9.5 G/DL (ref 11.9–15.1)
IMMATURE GRANULOCYTES: 3 %
LACTIC ACID, WHOLE BLOOD: 1.3 MMOL/L (ref 0.7–2.1)
LYMPHOCYTES # BLD: 14 % (ref 24–44)
LYMPHOCYTES, BODY FLUID: 5 %
MAGNESIUM: 1.4 MG/DL (ref 1.6–2.6)
MCH RBC QN AUTO: 29 PG (ref 25.2–33.5)
MCHC RBC AUTO-ENTMCNC: 31.1 G/DL (ref 28.4–34.8)
MCV RBC AUTO: 93 FL (ref 82.6–102.9)
MONOCYTE, FLUID: NORMAL %
MONOCYTES # BLD: 6 % (ref 1–7)
MORPHOLOGY: NORMAL
NEUTROPHIL, FLUID: 86 %
NRBC AUTOMATED: 0 PER 100 WBC
OTHER CELLS FLUID: NORMAL %
PDW BLD-RTO: 13.3 % (ref 11.8–14.4)
PLATELET # BLD: 377 K/UL (ref 138–453)
PLATELET ESTIMATE: ABNORMAL
PMV BLD AUTO: 9.9 FL (ref 8.1–13.5)
POTASSIUM SERPL-SCNC: 3.7 MMOL/L (ref 3.7–5.3)
RBC # BLD: 3.28 M/UL (ref 3.95–5.11)
RBC # BLD: ABNORMAL 10*6/UL
RBC FLUID: <3000 /MM3
SEG NEUTROPHILS: 76 % (ref 36–66)
SEGMENTED NEUTROPHILS ABSOLUTE COUNT: 15.81 K/UL (ref 1.8–7.7)
SODIUM BLD-SCNC: 138 MMOL/L (ref 135–144)
SOURCE: NORMAL
SPECIMEN TYPE: NORMAL
SURGICAL PATHOLOGY REPORT: NORMAL
TROPONIN INTERP: NORMAL
TROPONIN T: NORMAL NG/ML
TROPONIN, HIGH SENSITIVITY: 9 NG/L (ref 0–14)
WBC # BLD: 20.8 K/UL (ref 3.5–11.3)
WBC # BLD: ABNORMAL 10*3/UL
WBC FLUID: 4648 /MM3

## 2021-07-29 PROCEDURE — 99232 SBSQ HOSP IP/OBS MODERATE 35: CPT | Performed by: PSYCHIATRY & NEUROLOGY

## 2021-07-29 PROCEDURE — 6360000002 HC RX W HCPCS: Performed by: NURSE PRACTITIONER

## 2021-07-29 PROCEDURE — 94761 N-INVAS EAR/PLS OXIMETRY MLT: CPT

## 2021-07-29 PROCEDURE — 6370000000 HC RX 637 (ALT 250 FOR IP): Performed by: STUDENT IN AN ORGANIZED HEALTH CARE EDUCATION/TRAINING PROGRAM

## 2021-07-29 PROCEDURE — 93010 ELECTROCARDIOGRAM REPORT: CPT | Performed by: INTERNAL MEDICINE

## 2021-07-29 PROCEDURE — 2060000000 HC ICU INTERMEDIATE R&B

## 2021-07-29 PROCEDURE — 71045 X-RAY EXAM CHEST 1 VIEW: CPT

## 2021-07-29 PROCEDURE — 97535 SELF CARE MNGMENT TRAINING: CPT

## 2021-07-29 PROCEDURE — 6370000000 HC RX 637 (ALT 250 FOR IP): Performed by: INTERNAL MEDICINE

## 2021-07-29 PROCEDURE — 99233 SBSQ HOSP IP/OBS HIGH 50: CPT | Performed by: INTERNAL MEDICINE

## 2021-07-29 PROCEDURE — 6370000000 HC RX 637 (ALT 250 FOR IP): Performed by: NURSE PRACTITIONER

## 2021-07-29 PROCEDURE — 2580000003 HC RX 258: Performed by: NURSE PRACTITIONER

## 2021-07-29 PROCEDURE — 94668 MNPJ CHEST WALL SBSQ: CPT

## 2021-07-29 PROCEDURE — 94640 AIRWAY INHALATION TREATMENT: CPT

## 2021-07-29 PROCEDURE — 95816 EEG AWAKE AND DROWSY: CPT | Performed by: PSYCHIATRY & NEUROLOGY

## 2021-07-29 PROCEDURE — 6370000000 HC RX 637 (ALT 250 FOR IP): Performed by: FAMILY MEDICINE

## 2021-07-29 PROCEDURE — 80048 BASIC METABOLIC PNL TOTAL CA: CPT

## 2021-07-29 PROCEDURE — 83605 ASSAY OF LACTIC ACID: CPT

## 2021-07-29 PROCEDURE — 2700000000 HC OXYGEN THERAPY PER DAY

## 2021-07-29 PROCEDURE — 36415 COLL VENOUS BLD VENIPUNCTURE: CPT

## 2021-07-29 PROCEDURE — 83735 ASSAY OF MAGNESIUM: CPT

## 2021-07-29 PROCEDURE — 2580000003 HC RX 258: Performed by: STUDENT IN AN ORGANIZED HEALTH CARE EDUCATION/TRAINING PROGRAM

## 2021-07-29 PROCEDURE — 84484 ASSAY OF TROPONIN QUANT: CPT

## 2021-07-29 PROCEDURE — 6360000002 HC RX W HCPCS: Performed by: STUDENT IN AN ORGANIZED HEALTH CARE EDUCATION/TRAINING PROGRAM

## 2021-07-29 PROCEDURE — 93005 ELECTROCARDIOGRAM TRACING: CPT | Performed by: NURSE PRACTITIONER

## 2021-07-29 PROCEDURE — 6370000000 HC RX 637 (ALT 250 FOR IP): Performed by: PHYSICIAN ASSISTANT

## 2021-07-29 PROCEDURE — 85025 COMPLETE CBC W/AUTO DIFF WBC: CPT

## 2021-07-29 PROCEDURE — APPSS30 APP SPLIT SHARED TIME 16-30 MINUTES: Performed by: NURSE PRACTITIONER

## 2021-07-29 PROCEDURE — 99233 SBSQ HOSP IP/OBS HIGH 50: CPT | Performed by: PHYSICIAN ASSISTANT

## 2021-07-29 RX ORDER — MAGNESIUM SULFATE 1 G/100ML
1000 INJECTION INTRAVENOUS PRN
Status: DISCONTINUED | OUTPATIENT
Start: 2021-07-29 | End: 2021-08-03 | Stop reason: HOSPADM

## 2021-07-29 RX ORDER — IPRATROPIUM BROMIDE AND ALBUTEROL SULFATE 2.5; .5 MG/3ML; MG/3ML
1 SOLUTION RESPIRATORY (INHALATION) 3 TIMES DAILY
Status: DISCONTINUED | OUTPATIENT
Start: 2021-07-29 | End: 2021-08-01

## 2021-07-29 RX ORDER — MAGNESIUM SULFATE HEPTAHYDRATE 40 MG/ML
4000 INJECTION, SOLUTION INTRAVENOUS ONCE
Status: DISCONTINUED | OUTPATIENT
Start: 2021-07-29 | End: 2021-07-29

## 2021-07-29 RX ORDER — NICOTINE 21 MG/24HR
1 PATCH, TRANSDERMAL 24 HOURS TRANSDERMAL DAILY
Status: DISCONTINUED | OUTPATIENT
Start: 2021-07-29 | End: 2021-08-03 | Stop reason: HOSPADM

## 2021-07-29 RX ORDER — MAGNESIUM SULFATE 1 G/100ML
1000 INJECTION INTRAVENOUS
Status: DISPENSED | OUTPATIENT
Start: 2021-07-29 | End: 2021-07-29

## 2021-07-29 RX ORDER — GUAIFENESIN 600 MG/1
600 TABLET, EXTENDED RELEASE ORAL 2 TIMES DAILY
Status: DISCONTINUED | OUTPATIENT
Start: 2021-07-29 | End: 2021-08-03 | Stop reason: HOSPADM

## 2021-07-29 RX ADMIN — FENTANYL CITRATE 25 MCG: 50 INJECTION, SOLUTION INTRAMUSCULAR; INTRAVENOUS at 04:35

## 2021-07-29 RX ADMIN — FENTANYL CITRATE 50 MCG: 50 INJECTION, SOLUTION INTRAMUSCULAR; INTRAVENOUS at 21:44

## 2021-07-29 RX ADMIN — GUAIFENESIN 600 MG: 600 TABLET, EXTENDED RELEASE ORAL at 12:36

## 2021-07-29 RX ADMIN — POLYETHYLENE GLYCOL 3350 17 G: 17 POWDER, FOR SOLUTION ORAL at 17:41

## 2021-07-29 RX ADMIN — POTASSIUM CHLORIDE 20 MEQ: 1500 TABLET, EXTENDED RELEASE ORAL at 09:10

## 2021-07-29 RX ADMIN — BUSPIRONE HYDROCHLORIDE 15 MG: 15 TABLET ORAL at 09:13

## 2021-07-29 RX ADMIN — PREGABALIN 200 MG: 100 CAPSULE ORAL at 21:30

## 2021-07-29 RX ADMIN — PIPERACILLIN AND TAZOBACTAM 3375 MG: 3; .375 INJECTION, POWDER, FOR SOLUTION INTRAVENOUS at 02:17

## 2021-07-29 RX ADMIN — PIPERACILLIN AND TAZOBACTAM 3375 MG: 3; .375 INJECTION, POWDER, FOR SOLUTION INTRAVENOUS at 18:46

## 2021-07-29 RX ADMIN — PREGABALIN 200 MG: 100 CAPSULE ORAL at 15:29

## 2021-07-29 RX ADMIN — FENTANYL CITRATE 50 MCG: 50 INJECTION, SOLUTION INTRAMUSCULAR; INTRAVENOUS at 02:17

## 2021-07-29 RX ADMIN — PREGABALIN 200 MG: 100 CAPSULE ORAL at 09:10

## 2021-07-29 RX ADMIN — CARBAMAZEPINE 200 MG: 200 TABLET ORAL at 15:29

## 2021-07-29 RX ADMIN — SODIUM CHLORIDE, PRESERVATIVE FREE 10 ML: 5 INJECTION INTRAVENOUS at 09:12

## 2021-07-29 RX ADMIN — BUSPIRONE HYDROCHLORIDE 15 MG: 15 TABLET ORAL at 15:29

## 2021-07-29 RX ADMIN — BUSPIRONE HYDROCHLORIDE 15 MG: 15 TABLET ORAL at 21:34

## 2021-07-29 RX ADMIN — FENTANYL CITRATE 25 MCG: 50 INJECTION, SOLUTION INTRAMUSCULAR; INTRAVENOUS at 12:45

## 2021-07-29 RX ADMIN — MAGNESIUM SULFATE HEPTAHYDRATE 1000 MG: 1 INJECTION, SOLUTION INTRAVENOUS at 15:31

## 2021-07-29 RX ADMIN — BUDESONIDE AND FORMOTEROL FUMARATE DIHYDRATE 2 PUFF: 160; 4.5 AEROSOL RESPIRATORY (INHALATION) at 09:00

## 2021-07-29 RX ADMIN — MAGNESIUM SULFATE HEPTAHYDRATE 1000 MG: 1 INJECTION, SOLUTION INTRAVENOUS at 17:37

## 2021-07-29 RX ADMIN — IPRATROPIUM BROMIDE AND ALBUTEROL SULFATE 1 AMPULE: .5; 3 SOLUTION RESPIRATORY (INHALATION) at 12:20

## 2021-07-29 RX ADMIN — ENOXAPARIN SODIUM 40 MG: 40 INJECTION SUBCUTANEOUS at 09:11

## 2021-07-29 RX ADMIN — POLYETHYLENE GLYCOL 3350 17 G: 17 POWDER, FOR SOLUTION ORAL at 21:36

## 2021-07-29 RX ADMIN — FENTANYL CITRATE 25 MCG: 50 INJECTION, SOLUTION INTRAMUSCULAR; INTRAVENOUS at 17:35

## 2021-07-29 RX ADMIN — BACLOFEN 10 MG: 10 TABLET ORAL at 15:29

## 2021-07-29 RX ADMIN — ROPINIROLE HYDROCHLORIDE 1 MG: 1 TABLET, FILM COATED ORAL at 21:34

## 2021-07-29 RX ADMIN — IPRATROPIUM BROMIDE AND ALBUTEROL SULFATE 1 AMPULE: .5; 3 SOLUTION RESPIRATORY (INHALATION) at 09:00

## 2021-07-29 RX ADMIN — FERROUS SULFATE TAB EC 325 MG (65 MG FE EQUIVALENT) 325 MG: 325 (65 FE) TABLET DELAYED RESPONSE at 09:10

## 2021-07-29 RX ADMIN — FOLIC ACID 1 MG: 1 TABLET ORAL at 09:10

## 2021-07-29 RX ADMIN — SODIUM CHLORIDE, POTASSIUM CHLORIDE, SODIUM LACTATE AND CALCIUM CHLORIDE: 600; 310; 30; 20 INJECTION, SOLUTION INTRAVENOUS at 02:17

## 2021-07-29 RX ADMIN — BACLOFEN 10 MG: 10 TABLET ORAL at 09:10

## 2021-07-29 RX ADMIN — AMLODIPINE BESYLATE 5 MG: 5 TABLET ORAL at 09:10

## 2021-07-29 RX ADMIN — PIPERACILLIN AND TAZOBACTAM 3375 MG: 3; .375 INJECTION, POWDER, FOR SOLUTION INTRAVENOUS at 10:30

## 2021-07-29 RX ADMIN — GUAIFENESIN 600 MG: 600 TABLET, EXTENDED RELEASE ORAL at 21:34

## 2021-07-29 RX ADMIN — CARBAMAZEPINE 200 MG: 200 TABLET ORAL at 09:10

## 2021-07-29 RX ADMIN — TOPIRAMATE 50 MG: 25 TABLET, FILM COATED ORAL at 21:30

## 2021-07-29 RX ADMIN — CARBAMAZEPINE 200 MG: 200 TABLET ORAL at 21:30

## 2021-07-29 RX ADMIN — FENTANYL CITRATE 25 MCG: 50 INJECTION, SOLUTION INTRAMUSCULAR; INTRAVENOUS at 08:17

## 2021-07-29 RX ADMIN — SODIUM CHLORIDE, PRESERVATIVE FREE 10 ML: 5 INJECTION INTRAVENOUS at 21:35

## 2021-07-29 RX ADMIN — MAGNESIUM SULFATE HEPTAHYDRATE 1000 MG: 1 INJECTION, SOLUTION INTRAVENOUS at 08:02

## 2021-07-29 RX ADMIN — BACLOFEN 10 MG: 10 TABLET ORAL at 21:34

## 2021-07-29 RX ADMIN — TOPIRAMATE 50 MG: 25 TABLET, FILM COATED ORAL at 09:10

## 2021-07-29 RX ADMIN — FERROUS SULFATE TAB EC 325 MG (65 MG FE EQUIVALENT) 325 MG: 325 (65 FE) TABLET DELAYED RESPONSE at 21:33

## 2021-07-29 RX ADMIN — MAGNESIUM SULFATE HEPTAHYDRATE 1000 MG: 1 INJECTION, SOLUTION INTRAVENOUS at 09:01

## 2021-07-29 ASSESSMENT — ENCOUNTER SYMPTOMS
ABDOMINAL DISTENTION: 0
TACHYPNEA: 1
CONSTIPATION: 0
APNEA: 0
SORE THROAT: 0
EYE PAIN: 0
ABDOMINAL PAIN: 1
RHINORRHEA: 0
SHORTNESS OF BREATH: 1
TROUBLE SWALLOWING: 0
VOMITING: 0
COUGH: 1
COLOR CHANGE: 0
DIARRHEA: 0
EYE DISCHARGE: 0
NAUSEA: 0
WHEEZING: 0

## 2021-07-29 ASSESSMENT — PAIN SCALES - GENERAL
PAINLEVEL_OUTOF10: 8
PAINLEVEL_OUTOF10: 6
PAINLEVEL_OUTOF10: 8
PAINLEVEL_OUTOF10: 7
PAINLEVEL_OUTOF10: 8
PAINLEVEL_OUTOF10: 7

## 2021-07-29 ASSESSMENT — PAIN DESCRIPTION - PAIN TYPE: TYPE: ACUTE PAIN

## 2021-07-29 ASSESSMENT — PAIN DESCRIPTION - LOCATION: LOCATION: BACK

## 2021-07-29 NOTE — PROGRESS NOTES
[facility]  Office: [address], DO, Crystal Caballero, DO, Kimberleymichael Reyes, DO, [facility] Blood, DO, Pasquale Varela MD, Heather Hendrickson MD, Marvin Krishnamurthy MD, Reagan Leija MD, Funmilayo Granados MD, Angeline Castillo MD, Main Lara MD, Naa Sawant, DO, Riaz Kelly MD, Regine Jones, DO, Harrietta Goldberg, MD,  Stephon Jeong DO, Jeanne Schwartz MD, Ragini Cheng MD, Nita Aguila MD, Jacqueline Felipe MD, Raffi Chang MD, Daniel Macias MD, [facility] Silver[facility], [facility], [facility], CNP, Shantel Vivas, CNP, Florian Burris, CNS, Maine Zurita, CNP, Driss Ray, CNP, Conor Quiñones, CNP, Donna Dobbs, CNP, Khris Watts, CNP, NICOLLE LaresC, Audra Marcano, [facility], Gibran Rifton, CNP, Aashish Cohen, CNP, Varghese Walden, CNP, Cade Plush, CNP, Rogelio Reid, CNP, Jessy Rodgers, CNP, Kristal Simmons, CNP, Ignacio Willard, [address] [place] [address]    Progress Note    7/29/2021    10:05 AM    Name:   Abran Yeboah  MRN:     [id number]     Acct:      [de-identified]   Room:   2022/2022-01   Day:  8  Admit Date:  7/21/2021  3:06 PM    PCP:   Rupal Zepeda MD  Code Status:  Full Code    Subjective:     C/C:   Chief Complaint   Patient presents with    Back Pain     states chronic back pain becoming worse.  has recently started physical therapy     Interval History Status: Improved. Pt seen and evaluated this morning. She is much more awake. She states that she feels better. ABD pain is still present, but improved. She is passing gas. She has not had a bowel movement. She is currently afebrile and hemodynamically stable. Labs reviewed. Brief History:      This is a 58-year-old female with underlying history of hypertension, COPD, anxiety/depression, UTI, thoracic compression fractures, peripheral neuropathy, migraine headaches, seizure disorder, and alcoholism (last drink 6 months ago) who presents the emergency department with generalized malaise, headache and abdominal/back pain.    Initial blood work revealed leukocytosis, elevated alkaline phosphatase, she was tachycardic and hypoxic as well, imaging revealed picture of acute diverticulitis/colitis of left colon without peripheral abscess along with chronic pancreatitis, CT spine revealed stable T7-T9 and T11 compression fracture, worsening of compression fracture of T6 and new T5 and superior L5 subacute fracture. Admitted to the hospital for sepsis related to diverticulitis. Started on zosyn. Neurosurgery consulted regarding compression fractures, no surgical intervention inpatient. Pt condition continued to worsen. Chest x-ray suggestive of pneumonia. Pulmonology consulted. Hi flow nasal cannula initiated in conjunction with steroids. ID consulted for abx recommendations. 7/27: pt continued to deteriorate clinically. Repeat CT of the abdomen revealed LUQ abscess, left pleural effusion. IR consulted for thoracentesis and intraabdominal abscess drainage. General surgery consulted for intraabdominal abscess. 7/28: IR guided drain placed for intraabdominal abscess. Thoracentesis completed, 400 mL fluid removed. Exudative based on Light's criteria. Review of Systems:     Constitutional:  negative for chills, fevers, sweats  Respiratory:  + cough, dyspnea on exertion, shortness of breath  Cardiovascular:  negative for chest pain, chest pressure/discomfort, lower extremity edema, palpitations  Gastrointestinal:  + for abdominal pain. Negative for constipation, diarrhea, nausea, vomiting  Neurological:  negative for dizziness, headache    Medications:      Allergies:  No Known Allergies    Current Meds:   Scheduled Meds:    magnesium sulfate  1,000 mg Intravenous Q1H    guaiFENesin  600 mg Oral BID    nicotine  1 patch Transdermal Daily    lidocaine  1 patch Transdermal Daily    [Held by provider] spironolactone  50 mg Oral Daily    ipratropium-albuterol  1 ampule smoking history. She has never used smokeless tobacco. She reports previous alcohol use. She reports current drug use. Frequency: 2.00 times per week. Drug: Marijuana. Family History:   Family History   Problem Relation Age of Onset    Other Father     Cancer Maternal Grandmother     Heart Disease Paternal Grandmother     Esophageal Cancer Maternal Aunt        Vitals:  BP (!) 141/85   Pulse 90   Temp 99.3 °F (37.4 °C) (Oral)   Resp 18   Ht 5' 5\" (1.651 m)   Wt 142 lb 9.6 oz (64.7 kg)   SpO2 95%   BMI 23.73 kg/m²   Temp (24hrs), Av.1 °F (37.3 °C), Min:98.6 °F (37 °C), Max:99.5 °F (37.5 °C)    No results for input(s): POCGLU in the last 72 hours. I/O (24Hr): Intake/Output Summary (Last 24 hours) at 2021 1005  Last data filed at 2021 0500  Gross per 24 hour   Intake 3770 ml   Output 1203 ml   Net 2567 ml       Labs:  Hematology:  Recent Labs     21  0511 21  0511 21  1230 21  0159 21  0453 21  1706 21  0320   WBC 19.4*  --   --   --  17.0*  --  20.8*   RBC 4.06  --   --   --  2.89*  --  3.28*   HGB 11.9   < >  --   --  8.5* 9.8* 9.5*   HCT 37.9   < >  --   --  27.9* 31.2* 30.5*   MCV 93.3  --   --   --  96.5  --  93.0   MCH 29.3  --   --   --  29.4  --  29.0   MCHC 31.4  --   --   --  30.5  --  31.1   RDW 13.3  --   --   --  13.3  --  13.3     --   --   --  307  --  377   MPV 10.2  --   --   --  10.2  --  9.9   CRP  --   --  291.7*  --   --   --   --    INR  --   --   --  1.1 1.1  --   --     < > = values in this interval not displayed.      Chemistry:  Recent Labs     21  0511 21  0005 21  0453 21  0741 21  0320 21  0437     --  135  --  138  --    K 3.2*  --  3.7  --  3.7  --    CL 99  --  101  --  104  --    CO2 26  --  15*  --  23  --    GLUCOSE 160*  --  200*  --  89  --    BUN 18  --  11  --  9  --    CREATININE 0.68  --  0.34*  --  0.53  --    MG 1.6  --   --   --   --  1.4*   ANIONGAP 16 --  19*  --  11  --    LABGLOM >60  --  >60  --  >60  --    GFRAA >60  --  >60  --  >60  --    CALCIUM 8.7  --  7.1*  --  8.5*  --    TROPHS  --   --   --   --   --  9   LACTACIDWB  --  8.7* 7.7* 1.2  --   --      Recent Labs     07/28/21  0005   AMYLASE 105*   LIPASE 101*     ABG:  Lab Results   Component Value Date    POCPH 7.467 07/26/2021    POCPCO2 44.7 07/26/2021    POCPO2 71.9 07/26/2021    POCHCO3 32.3 07/26/2021    NBEA NOT REPORTED 07/26/2021    PBEA 8 07/26/2021    EPY9XHF NOT REPORTED 07/26/2021    PMZR2KVQ 95 07/26/2021    FIO2 INFORMATION NOT PROVIDED 07/28/2021     Lab Results   Component Value Date/Time    SPECIAL NOT REPORTED 07/28/2021 11:51 AM     Lab Results   Component Value Date/Time    CULTURE PENDING 07/28/2021 11:51 AM       Radiology:  XR THORACIC SPINE (3 VIEWS)    Result Date: 7/23/2021  Lumbar spine: Superior endplate fracture L5 which appears acute to subacute. No subluxation. Other vertebra well maintained. Spine significant compression deformity T6 with there are endplate loss in height T8, T9 and T10. No subluxation. Mild levo scoliotic curvature. Osteopenia complicates assessment. Pedicles are intact. Low lung volumes complicate assessment. There is suggestion of pleural effusions. XR LUMBAR SPINE (2-3 VIEWS)    Result Date: 7/23/2021  Lumbar spine: Superior endplate fracture L5 which appears acute to subacute. No subluxation. Other vertebra well maintained. Spine significant compression deformity T6 with there are endplate loss in height T8, T9 and T10. No subluxation. Mild levo scoliotic curvature. Osteopenia complicates assessment. Pedicles are intact. Low lung volumes complicate assessment. There is suggestion of pleural effusions. XR ABDOMEN (KUB) (SINGLE AP VIEW)    Result Date: 7/22/2021  Mild dilation of the right hemicolon and patchy small bowel gas most likely due to ileus.      XR ACUTE ABD SERIES CHEST 1 VW    Result Date: 7/24/2021  Bilateral airspace disease and pleural effusions. Findings in the upper lobes appear increased compared to prior, left greater than right. Findings may be related to asymmetric edema with superimposed pneumonia not excluded. Gas and stool are seen throughout nondilated bowel loops with few nonspecific air-fluid levels in the right lower abdomen, likely in small bowel. Findings may be physiologic or related to mild ileus. No evidence of obstruction or free air. CT HEAD WO CONTRAST    Result Date: 7/25/2021  No acute intracranial abnormality. Suboccipital craniectomy. MRI THORACIC SPINE WO CONTRAST    Result Date: 7/23/2021  1. Limited examination. 2. Compression deformities involving T6, T8, T9 and T11. These are likely chronic in nature. Diffusely decreased T1 and T2 marrow signal within the T6 vertebral body compatible with the sclerosis seen on the prior CT. 3. There is minimal bone marrow edema involving the left lateral elements of T6 and T7. Unclear whether this is degenerative in nature or perhaps sequelae of recent or remote trauma. 4. No significant spinal canal stenosis of the thoracic spine. 5. Moderate bilateral neural foraminal narrowing at T6-T7. 6. Bilateral pleural effusions, left greater than right. MRI LUMBAR SPINE WO CONTRAST    Result Date: 7/23/2021  1. Acute compression deformity of the superior endplate of L5 with approximately 35% loss of height. No significant bulging of the posterior cortex. 2. No significant spinal canal stenosis of the lumbar spine. 3. Neural foraminal narrowing at L3-L4 through L5-S1. 4. Minimal retrolisthesis at L5-S1. XR CHEST PORTABLE    Result Date: 7/25/2021  1. Increasing vascular congestion and left perihilar opacity. 2.  Pleural effusions and basilar opacities are otherwise without significant change.      XR CHEST PORTABLE    Result Date: 7/23/2021  Interval development of bibasilar infiltrates and small pleural effusions which could represent dependent edema, pneumonia or aspiration. XR CHEST PORTABLE    Result Date: 7/22/2021  No radiologic evidence of acute cardiopulmonary disease. CT CHEST ABDOMEN PELVIS W CONTRAST    Result Date: 7/21/2021  Probable acute diverticulitis or colitis left colon. No evidence of perforation or abscess at this time. Chronic pancreatitis. Multiple compression fractures some of which are new since the previous exam. Lingular ground-glass infiltrate. Recommend follow-up to resolution. Intra-atrial lipoma right atrium. Cardiac echo may be helpful. CT LUMBAR SPINE TRAUMA RECONSTRUCTION    Result Date: 7/21/2021  CT thoracic spine: 1. Decrease in height in T6 compared to prior study compatible with worsening compression with subacute etiology and mild protrusion of the dorsal aspect of T6. Narrowed T6-T7 neural foramina. 2.  Chronic superior height T8, T9, and T11 without interval change from prior study. 3.  Mild prevertebral soft tissue prominence T6 without evidence of abscess formation. CT lumbar spine: 1. No traumatic malalignment. 2.  Compression fracture superior L5 with subacute appearance. 3.  Calcifications in the abdomen incompletely included appearance suggesting pancreatic calcifications. RECOMMENDATIONS: Please reference CT chest, abdomen and pelvis of same day. CT THORACIC SPINE TRAUMA RECONSTRUCTION    Result Date: 7/21/2021  CT thoracic spine: 1. Decrease in height in T6 compared to prior study compatible with worsening compression with subacute etiology and mild protrusion of the dorsal aspect of T6. Narrowed T6-T7 neural foramina. 2.  Chronic superior height T8, T9, and T11 without interval change from prior study. 3.  Mild prevertebral soft tissue prominence T6 without evidence of abscess formation. CT lumbar spine: 1. No traumatic malalignment. 2.  Compression fracture superior L5 with subacute appearance.  3.  Calcifications in the abdomen incompletely included appearance suggesting pancreatic calcifications. RECOMMENDATIONS: Please reference CT chest, abdomen and pelvis of same day. Physical Examination:        General appearance:  Alert, much more awake today. Does not appear to be in distress currently  Mental Status: Mentation improved. A&O x 4. Lungs:  Breath sounds are diminished bilateral bases, L>R. Breath sounds are distant throughout. Heart:  regular rate and rhythm, no murmur  Abdomen:  soft, nondistended, bowel sounds are hypoactive. No masses, hepatomegaly, splenomegaly. There is tenderness in LLQ. FRANSISCO drain present and draining what appears to be gross blood. No guarding, peritoneal signs.   Extremities:  no edema, redness, tenderness in the calves  Skin:  Diffuse mottled appearing rash covering back consistent with livedo reticularis    Assessment:        Hospital Problems         Last Modified POA    * (Principal) Severe sepsis ([address] 7/28/2021 Yes    Essential hypertension 7/21/2021 Yes    Tobacco use 7/21/2021 Yes    Seizure disorder ([address] 7/25/2021 Yes    COPD with acute exacerbation ([address] 7/22/2021 Yes    Acute respiratory failure with hypoxia ([address] 7/22/2021 Yes    Recurrent major depressive disorder ([address] 7/21/2021 Yes    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24, the patient underwent 2 surgical resections without known recurrence 9/08/0468 Yes    Metabolic encephalopathy 3/21/2528 Yes    AMAN (generalized anxiety disorder) 7/21/2021 Yes    Chronic peripheral neuropathic pain 7/21/2021 Yes    History of alcohol abuse 7/21/2021 Yes    Personal history of astrocytoma 7/21/2021 Yes    Marijuana abuse 7/21/2021 Yes    Closed fracture of fifth thoracic vertebra ([address] 7/22/2021 Yes    Diverticulitis of large intestine with abscess without bleeding 7/28/2021 Yes    Elevated brain natriuretic peptide (BNP) level 7/22/2021 Yes    Elevated alkaline phosphatase level 7/22/2021 Yes    Chronic pancreatitis ([address] 7/22/2021 Yes    Erythema ab igne 7/22/2021 Yes    Compression fracture of thoracic vertebra ([address] 7/23/2021 Yes    Compression of lumbar vertebra ([address] 3/32/0454 Yes    Metabolic acidosis with increased anion gap and accumulation of organic acids 7/28/2021 Yes    Parapneumonic effusion 7/29/2021 Yes          Plan:        #Severe sepsis secondary to diverticulitis with intraabdominal abscess formation/parapneumonic effusion: Continue zosyn. S/p IR guided drainage of intraabdominal abscess. FRANSISCO drain present. ID following. Continue LR @ 100 ml/h. #Metabolic encephalopathy secondary to sepsis  - improved     #New fracture at T5 and L5,Chronic compression fracture in the thoracic vertebrae, there is worsening and compression fracture at T6 : Neurosurgery with no plans for surgical intervention inpatient. Needs OP DEXA scan and f/u OP for consideration of kyphoplasty.      #Acute hypoxemic respiratory failure: secondary to decompensated COPD/pneumonia/parapneumonic effusion: Pulmonology following. Remains on zosyn. Discontinue steroids. S/p thoracentesis. Effusion exudative. Culture pending. Will repeat CXR today to assure no recurrence of effusion. #Hypomagnesemia  - replacement ordered     #Seizure disorder: Follows up with neurology as an outpatient, continue antiepileptic drugs as ordered.  Neurology following.   Coni Longs  #HTN: continue amlodipine, aldactone as BP tolerates     #HPL: continue home medications     #DVT prophylaxis     Jamie Dobson PA-C  7/29/2021  10:05 AM

## 2021-07-29 NOTE — PROGRESS NOTES
Physical Therapy        Physical Therapy Cancel Note      DATE: 2021    NAME: Cheryl Guzmán  MRN: [id number]   : 1970      Patient not seen this date for Physical Therapy due to:    Patient Declined: Pt states \"I just got back into bed. I'm not doing anything today. \" RN informed.       Electronically signed by Maribel Pabon PTA on 2021 at 2:24 PM

## 2021-07-29 NOTE — PLAN OF CARE
Problem: Pain:  Goal: Pain level will decrease  Description: Pain level will decrease  Outcome: Ongoing  Goal: Control of acute pain  Description: Control of acute pain  Outcome: Ongoing  Goal: Control of chronic pain  Description: Control of chronic pain  Outcome: Ongoing     Problem: Skin Integrity:  Goal: Will show no infection signs and symptoms  Description: Will show no infection signs and symptoms  Outcome: Ongoing  Goal: Absence of new skin breakdown  Description: Absence of new skin breakdown  Outcome: Ongoing     Problem: Falls - Risk of:  Goal: Will remain free from falls  Description: Will remain free from falls  Outcome: Ongoing  Goal: Absence of physical injury  Description: Absence of physical injury  Outcome: Ongoing     Problem: Nutrition  Goal: Optimal nutrition therapy  7/28/2021 2340 by Kasey Joseph  Outcome: Ongoing  7/28/2021 1525 by Michael Rosas RD, LD  Note: Nutrition Problem #1: Inadequate oral intake  Intervention: Food and/or Nutrient Delivery: Continue NPO (Resume PO diet as able)  Nutritional Goals: Intake able to meet >75% of estimated nutrition needs

## 2021-07-29 NOTE — PLAN OF CARE
Problem: RESPIRATORY  Intervention: Respiratory assessment  Note:   PROVIDE ADEQUATE OXYGENATION WITH ACCEPTABLE SP02/ABG'S    [x]  IDENTIFY APPROPRIATE OXYGEN THERAPY  [x]   MONITOR SP02/ABG'S AS NEEDED   [x]   PATIENT EDUCATION AS NEEDED     BRONCHOSPASM/BRONCHOCONSTRICTION     [x]         IMPROVE AERATION/BREATH SOUNDS  [x]   ADMINISTER BRONCHODILATOR THERAPY AS APPROPRIATE  [x]   ASSESS BREATH SOUNDS  [x]   IMPLEMENT AEROSOL/MDI PROTOCOL  [x]   PATIENT EDUCATION AS NEEDED     [x]  NON INVASIVE VENTILATION  [x]  PROVIDE OPTIMAL VENTILATION/ACCEPTABLE SP02  [x]  IMPLEMENT NON INVASIVE VENTILATION PROTOCOL  [x]  ASSESSMENT SKIN INTEGRITY  [x]  PATIENT EDUCATION AS NEEDED  [x]  BIPAP AS NEEDED

## 2021-07-29 NOTE — PROGRESS NOTES
PROGRESS NOTE          PATIENT NAME: Desmond Wong RECORD NO. [id number]  DATE: 7/29/2021  SURGEON: Romain Daugherty  PRIMARY CARE PHYSICIAN: Yusuf Quinonez MD    HD: # 8    ASSESSMENT    Patient Active Problem List   Diagnosis    Acute bronchitis    Reflex sympathetic dystrophy of lower limb    Essential hypertension    Nicotine addiction    Psychophysiological insomnia    Anxiety    Alcoholic peripheral neuropathy (HCC)    Hypokalemia    Macrocytic anemia    Hypomagnesemia    Tobacco use    Ambulatory dysfunction    Seizure disorder ([address]    COPD with acute exacerbation ([address]    Paresthesia of lower extremity    Acute respiratory failure with hypoxia (HCC)    Pancreatic cyst    Recurrent major depressive disorder (HCC)    Elevated CA 19-9 level    Perineal mass, female    Esophagitis candidal     Condyloma    Astrocytoma (HCC) - 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24, the patient underwent 2 surgical resections without known recurrence    Alcohol use disorder, severe, in early remission ([address]    Metabolic encephalopathy    AMAN (generalized anxiety disorder)    Major depressive disorder, recurrent severe without psychotic features ([address]    Esophageal dysphagia    Chronic peripheral neuropathic pain    History of alcohol abuse    History of petit-mal seizures    Intractable episodic tension-type headache    Personal history of astrocytoma    Multilevel spine pain    Chronic pain syndrome    Marijuana abuse    Severe sepsis (HCC)    Closed fracture of fifth thoracic vertebra (HCC)    Diverticulitis of large intestine with abscess without bleeding    Elevated brain natriuretic peptide (BNP) level    Elevated alkaline phosphatase level    Chronic pancreatitis (HCC)    Erythema ab igne    Compression fracture of thoracic vertebra (HCC)    Compression of lumbar vertebra (HCC)    Metabolic acidosis with increased anion gap and accumulation of organic acids       MEDICAL DECISION MAKING AND PLAN    · Clinical picture has improved since IR placement of left upper quadrant drain and thoracentesis yesterday. · Hemodynamically stable: HR no 80s-90s, down from 120s yesterday. Normotensive. Afebrile, T-max 37.5  · Abdominal pain improved, but still present. Non-peritoneal  · CT abdomen abscess drainage yesterday showed possible microperforation with contrast leak from: 2 area of loculated fluid collection in the left LUQ. · Drain output is very dark sanguinous and not feculent or purulent, with culture negative for bacteria, positive for amylase. · ABx: Zosyn day 8  · Continue n.p.o. for now until after rounds  · We will continue to monitor. Possible need for OR if patient clinical picture does not improve or deteriorates again. · Continue remaining medical management per primary      Chief Complaint: \" My back hurts\"    82 Mary Johnston was seen and examined at bedside, no overnight events. Patient reports improved but continued abdominal pain after drain placement yesterday, reports some back pain as well. Overall though, feels better compared to yesterday. Urinating independently. On 3.5L NC. Breathing better today after thoracentesis yesterday. VSS, afebrile, T-max 37.5. OBJECTIVE  VITALS: Temp: Temp: 99.3 °F (37.4 °C)Temp  Av.1 °F (37.3 °C)  Min: 98.6 °F (37 °C)  Max: 99.5 °F (77.5 °C) BP Systolic (00KPP), HKF:810 , Min:104 , REW:583   Diastolic (16GPH), XVW:50, Min:61, Max:95   Pulse Pulse  Av.9  Min: 83  Max: 117 Resp Resp  Av.5  Min: 17  Max: 35 Pulse ox SpO2  Av.1 %  Min: 73 %  Max: 99 %  GENERAL: alert, cooperative, no distress  NEURO: A&Ox4  HEENT: NCAT, PERRLA  : normal  LUNGS: Unlabored breathing on 3L NC  HEART: normal rate and regular rhythm  ABDOMEN: Soft, nondistended, diffuse TTP worse at epigastric region. Nonperitoneal without rigidity/rebound.   Some voluntary guarding  EXTREMITY: no cyanosis, clubbing or edema    I/O last 3 completed shifts: In: 5100 [P.O.:120; I.V.:3650]  Out: 1503 [Urine:1400; Drains:103]    Drain/tube output: In: 8197 [P.O.:120; I.V.:3650]  Out: 1190 [Urine:1100; Drains:90]    LAB:  CBC:   Recent Labs     07/27/21  0511 07/27/21  0511 07/28/21  0453 07/28/21  1706 07/29/21  0320   WBC 19.4*  --  17.0*  --  20.8*   HGB 11.9   < > 8.5* 9.8* 9.5*   HCT 37.9   < > 27.9* 31.2* 30.5*   MCV 93.3  --  96.5  --  93.0     --  307  --  377    < > = values in this interval not displayed. BMP:   Recent Labs     07/27/21  0511 07/28/21  0453 07/29/21  0320    135 138   K 3.2* 3.7 3.7   CL 99 101 104   CO2 26 15* 23   BUN 18 11 9   CREATININE 0.68 0.34* 0.53   GLUCOSE 160* 200* 89     COAGS:   Recent Labs     07/28/21  0159 07/28/21  0453   APTT  --  27.6   INR 1.1 1.1       RADIOLOGY:  XR CHEST (SINGLE VIEW FRONTAL)    Result Date: 7/27/2021  Interval improvement in still mild interstitial edema with bilateral asymmetric, left greater than right, pleural effusions     CT ABDOMEN PELVIS W IV CONTRAST Additional Contrast? Radiologist Recommendation    Result Date: 7/27/2021  1. Large complex fluid collection in the left upper quadrant, concerning for abscess. This is likely related to the previously described colitis. 2. Consolidation and ground-glass opacity in both lower lobes with left pleural effusion, concerning for multifocal pneumonia. 3. Diffuse urinary bladder wall thickening. This could be due to underdistention, but correlation for any signs or symptoms of cystitis is recommended. 4. Chronic pancreatitis. 5. Nonobstructing left nephrolithiasis. CT ABSCESS DRAINAGE    Result Date: 7/28/2021  Successful percutaneous ultrasound and CT guided placement of 8 [place] left upper quadrant drainage catheter. New high attenuation material within the multi loculated left upper quadrant fluid collection suggests leakage from the adjacent abnormal colonic segment indicating micro perforation.  The above findings were called by Dr. Soo Rivero MD to Dr. Paige Kee On 7/28/2021 at 12:32. IR GUIDED THORACENTESIS PLEURAL    Result Date: 7/28/2021  Successful ultrasound guided thoracentesis. Nadir Mack,   7/29/21, 8:31 AM           Trauma Attending Reba Meade      I have reviewed the above [facility] note(s) and confirmed the key elements of the medical history and physical exam. I have seen and examined the pt. I have discussed the findings, established the care plan and recommendations with Resident, [facility] RN, bedside nurse.   Pt seen and examined- pt sitting on beside commode   Lactic acid 1.3   Leukocytosis    Drain with thick old blood/clot- suspect diverticular bleed/ microperf abscess  Per patient better than yesterday   Will follow with serial exams, continue NPO, marcelino Baires,   7/29/2021  6:47 PM

## 2021-07-29 NOTE — PROGRESS NOTES
[facility] Sepsis ([facility] [address] [A41.9] . Vitals:    07/29/21 1220   BP:    Pulse:    Resp:    Temp:    SpO2: 100%   . Patients home meds are   Prior to Admission medications    Medication Sig Start Date End Date Taking? Authorizing Provider   acamprosate (CAMPRAL) 333 MG tablet Take 2 tablets by mouth 3 times daily 7/16/21 8/15/21  LOI Maharaj NP   sertraline (ZOLOFT) 50 MG tablet Take 3 tablets by mouth daily for 7 days, THEN 2 tablets daily for 7 days, THEN 1 tablet daily for 7 days. 7/16/21 8/6/21  LOI Maharaj NP   FLUoxetine (PROZAC) 20 MG tablet Take 0.5 tablets by mouth daily for 7 days, THEN 1 tablet daily for 23 days. 7/16/21 8/15/21  LOI Maharaj NP   traZODone (DESYREL) 50 MG tablet TAKE 1 AND 1/2 TABLET BY MOUTH ONCE NIGHTLY AS NEEDED FOR SLEEP 7/16/21   LOI Maharaj NP   thermotabs (MEDI-LYTE) TABS tablet Take 1 tablet by mouth daily for 3 days 7/9/21 7/12/21  LOI Quijano NP   carBAMazepine (TEGRETOL XR) 200 MG extended release tablet Take 1 tablet by mouth 2 times daily 7/6/21   Miguel A Reich MD   topiramate (TOPAMAX) 25 MG tablet Take 2 tablets by mouth daily 7/6/21   Miguel A Reich MD   pregabalin (LYRICA) 200 MG capsule Take 1 capsule by mouth 3 times daily for 90 days.  7/2/21 9/30/21  Miguel A Reich MD   KLOR-CON M20 20 MEQ extended release tablet TAKE ONE TABLET BY MOUTH DAILY 6/24/21   uLdivina Huang MD   amLODIPine (NORVASC) 5 MG tablet TAKE ONE TABLET BY MOUTH DAILY 6/3/21   Ludivina Huang MD   busPIRone (BUSPAR) 15 MG tablet Take 1 tablet by mouth 3 times daily 5/5/21   Ludivina Huang MD   baclofen (LIORESAL) 10 MG tablet Take 1 tablet by mouth 3 times daily 5/25/21   Ludivina Huang MD   ibuprofen (ADVIL;MOTRIN) 600 MG tablet Take 1 tablet by mouth 3 times daily as needed for Pain 5/25/21   Ludivina Huang MD   sertraline (ZOLOFT) 100 MG tablet Take 2 tablets by mouth daily 5/17/21   LOI Garcia NP   ferrous sulfate (IRON 325) 325 (65 Fe) MG tablet Take 1 tablet by mouth 2 times daily 4/22/21   Ludivina Curry MD   rOPINIRole (REQUIP) 1 MG tablet Take 1 tablet by mouth nightly 4/1/21   Ludivina Curry MD   budesonide-formoterol [facility]) 160-4.5 MCG/ACT AERO Inhale 2 puffs into the lungs 2 times daily 3/31/21   Ludivina Curry MD   hydrOXYzine (ATARAX) 50 MG tablet TAKE ONE TABLET BY MOUTH THREE TIMES A DAY 3/12/21   Ludivina Curry MD   tiotropium (SPIRIVA RESPIMAT) 2.5 MCG/ACT AERS inhaler Inhale 2 puffs into the lungs daily 2/26/21 7/8/21  Arabella Santiago MD   sodium chloride (ALTAMIST SPRAY) 0.65 % nasal spray 1 spray by Nasal route as needed for Congestion 12/28/20   Ludivina Curry MD   ACETAMINOPHEN EXTRA STRENGTH 500 MG tablet Take 500 mg by mouth 3 times daily as needed 5/8/20   Laisha Newby MD   albuterol sulfate HFA (VENTOLIN HFA) 108 (90 Base) MCG/ACT inhaler Inhale 2 puffs into the lungs 4 times daily as needed for Wheezing 6/11/20   Ludivina Curry MD   vitamin B-1 (THIAMINE) 100 MG tablet Take 1 tablet by mouth daily 7/12/19   Ludivina Curry MD   vitamin D (ERGOCALCIFEROL) 60962 units CAPS capsule Take 1 capsule by mouth once a week 6/19/19   Ludivina Curry MD   folic acid (FOLVITE) 1 MG tablet Take 1 tablet by mouth daily 6/19/19   Ludivina Curry MD     Assessment        RR 14  Breath Sounds: CLEAR / DIMINISHED       Bronchodilator assessment at level  2    Secretion Management assessment at level  2    [x]    Bronchodilator Assessment  BRONCHODILATOR ASSESSMENT SCORE  Score 0 1 2 3 4 5   Breath Sounds   []  Patient Baseline []  No Wheeze good aeration [x]  Faint, scattered wheezing, good aeration []  Expiratory Wheezing and or moderately diminished []  Insp/Exp wheeze and/or very diminished []  Insp/Exp and/ or marked distress   Respiratory Rate   []  Patient Baseline [x]  Less than 20 [x]  Less than 20 []  20-25 []  Greater than 25 []  Greater than 25   Peak flow % of Pred or PB [x]  NA   []  Greater than 90%  []  81-90% []  71-80% []  Less than or equal to 70%  or unable to perform []  Unable due to Respiratory Distress   Dyspnea re []  Patient Baseline []  No SOB []  No SOB [x]  SOB on exertion []  SOB min activity []  At rest/acute   e FEV% Predicted       [x]  NA []  Above 69%  []  Unable []  Above 60-69%  []  Unable []  Above 50-59%  []  Unable []  Above 35-49%  []  Unable []  Less than 35%  []  Unable                  [x]  Secretion Management Assessment  Score 1 2 3   Bilateral Breath Sounds   [x]  Occasional Rhonchi []  Scattered Rhonchi []  Course Rhonchi and/or poor aeration   Sputum    [x]  Small amount of thin secretions []  Moderate amount of viscous secretions [x]  Copius, Viscious Yellow/ Secretions   CXR as reported by physician []  clear  []  Unavailable [x]  Infiltrates and/or consolidation  []  Unavailable []  Mucus Plugging and or lobar consolidation  []  Unavailable   Cough []  Strong, productive cough [x]  Weak productive cough []  No cough or weak non-productive cough   Kalpesh Diaz RCP  12:24 PM

## 2021-07-29 NOTE — PROGRESS NOTES
Neurology Nurse Practitioner Progress Note      INTERVAL HISTORY: This is a 45 y.o.  female admitted 7/21/2021 for back pain and lethargy. This is a follow-up neurology progress note. The patient was examined and the chart was reviewed. Discussed with the RN. Pt s/p LUQ drain placed with dark sanguinous drainage, +amylase, & L thoracentesis. She was wide awake, alert & oriented x 3; back to her baseline mentation; hemodynamically stable. HPI: Abdelrahman Alfonso is a 45 y.o. female with H/O HTN, COPD, astrocytoma resection x 2 (1989), long-term seizure disorder, migraine headaches, lumbar radiculopathy, recurrent UTIs, alcoholism, chronic pancreatitis, who was admitted 7/21/2021 for abdominal and back pain, generalized unwell feeling and lethargy. As per medical records, patient presented to ED with complaint of abdominal pain, worsening severe lower back pain, headache, generalized weakness and fatigue; all the symptoms started almost 2 to 3 days ago and have continued to worsen over time. Back pain was described as spasmodic and tightness; she does take baclofen for it. Patient denied any falls. Patient also reported increasing cough over the last 1 week with more phlegm. She reported poor appetite with limited oral intake; did not take any medications over this time. HOSPITAL COURSE:  At arrival, patient seemed uncomfortable, sitting slumped over in the wheelchair moaning, was hypertensive 134/106 mmHg, hypothermic with temp of 98.1 °F, tachycardic with heart rate of 105 and hypoxic, 93%. Leukocytosis of 21; he was started on vancomycin and Zosyn. Her urine was grossly purulent and malodorous. Patient was admitted under medicine service for further evaluation. CT abdomen showed probable acute diverticulitis/colitis of left colon & chronic pancreatitis. During this hospitalization, patient has had intermittent episodes of hypoxia, requiring BiPAP placement.     On the night of 7/23, patient had some seizure-like symptoms for around 15-30 seconds described as generalized body stiffness and jerking. Neurology was consulted on 7/24. As per mother patient has history of seizure disorder. She reported that as an infant patient got cyanotic and passed out; urgent tracheostomy was done; patient was placed on phenobarbital for a \"very long time\". At the age of 14, patient developed nocturnal urinary incontinence; she was diagnosed with petit mal seizures on sleep study. At the age of 24, patient developed severe headaches and had an episode of LOC; was diagnosed with astrocytoma and underwent resection x 2. She has been on Tegretol for quite some time and had been seizure-free. Mother reported that patient would develop upper arm jerking; usually patient is aware of her surroundings. Denied tongue bite or incontinence. Patient is a recovering alcoholic since 55/1372. However mother thinks patient might have drank on 7/11/2021; patient declined when mother confronted her. She is known to our service from her previous admission in 10/2020 due to seizure-like activity and DTs. Patient has history of migraine headaches for which she takes Topamax 50 mg twice daily; RLS - she takes Requip 1 mg daily. She is on Lyrica 200 mg 3 times daily for her peripheral neuropathy. Is on baclofen 10 mg 3 times daily for back pain. Takes BuSpar 15 mg 3 times daily for her depression/anxiety.      magnesium sulfate  1,000 mg Intravenous Q1H    guaiFENesin  600 mg Oral BID    nicotine  1 patch Transdermal Daily    lidocaine  1 patch Transdermal Daily    [Held by provider] spironolactone  50 mg Oral Daily    ipratropium-albuterol  1 ampule Inhalation 4x daily    polyethylene glycol  17 g Oral BID    carBAMazepine  200 mg Oral TID    topiramate  50 mg Oral BID    amLODIPine  5 mg Oral Daily    baclofen  10 mg Oral TID    budesonide-formoterol  2 puff Inhalation BID    busPIRone  15 mg Oral TID    ferrous sulfate  325 mg Oral BID    folic acid  1 mg Oral Daily    potassium chloride  20 mEq Oral Daily with breakfast    pregabalin  200 mg Oral TID    rOPINIRole  1 mg Oral Nightly    sodium chloride flush  5-40 mL Intravenous 2 times per day    enoxaparin  40 mg Subcutaneous Daily    piperacillin-tazobactam  3,375 mg Intravenous Q8H       Past Medical History:   Diagnosis Date    Acute respiratory failure with hypoxia ([facility] [address] 10/16/2020    Alcohol withdrawal syndrome, with delirium ([facility] [address] 12/14/2019    Alcoholism ([facility] [address]     Anemia 10/2020    GI bleed    Astrocytoma ([facility] [address] - 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24, the patient underwent 2 surgical resections without known recurrence 10/23/2020    Closed fracture of lateral portion of left tibial plateau 25/03/0510    COPD (chronic obstructive pulmonary disease) ([facility] [address]     CO2 retainer, on Bipap at night for this, Dr. Leo Hastings ( last visit 11/20/2020 and note on chart )    Depression     bipolar, major depressive disorder, ptsd, anxiety    Dysphagia     GI bleed 10/2020    Hypertension     Memory loss     Oxygen dependent     pt stated not needed as of 12/9/2020    Pain, joint, ankle and foot     Pancreatic lesion 10/2020    Dr. Katya Haider working up pt    Peripheral neuropathy     Seizures ([facility] [address]     also baseline tremors-last sz summer 2020    Tension headache     Under care of team 07/01/2021    neuro-Dr Wan-st munoz-last visit june 2021    Under care of team 07/01/2021    pain management-Hamzah jimenez-last visit june 2021    Under care of team 07/01/2021    pulmonology-Dr Dueñas- alexander-last virtual visit feb 2021    Under care of team 07/01/2021    psych-bahnfeldt NP-telemed-last visit may 2021    Under care of team 07/01/2021    rt-[address]-last visit june 2021    Wellness examination 07/01/2021    pcp-Ludivina grimes-last visit may 2021       Past Surgical History:   Procedure Laterality Date   Cintia Finnegan astrocytoma times 2    COLONOSCOPY N/A 10/22/2020    COLONOSCOPY DIAGNOSTIC performed by Ulysses Dumont MD at [address]  7/28/2021    CT ABSCESS DRAIN SUBCUTANEOUS 7/28/2021 [facility] CT SCAN    ENDOSCOPIC ULTRASOUND (LOWER) N/A 12/9/2020    ENDOSCOPIC ULTRASOUND, UPPER WITH LINEAR SCOPE FOR BIOPSY OF MASS ON HEAD OF PANCREAS performed by Maria Shi MD at [address] Left 7-3-13    ORIF tibial plateau    FRACTURE SURGERY Right     small finger metacarpal fracture    HAND SURGERY      pins    HYSTERECTOMY  2003    UPPER GASTROINTESTINAL ENDOSCOPY N/A 10/22/2020    EGD BIOPSY performed by Ulysses Dumont MD at [address] [facility] N/A 4/5/2021    EGD BIOPSY performed by Ulysses Dumont MD at [facility]       PHYSICAL EXAM:    Blood pressure (!) 141/85, pulse 90, temperature 99.3 °F (37.4 °C), temperature source Oral, resp. rate 18, height 5' 5\" (1.651 m), weight 142 lb 9.6 oz (64.7 kg), SpO2 95 %.       Limited Neurological Examination:  Patient was wide awake, alert and oriented x 3  Back at her baseline mentation  Normal speech and language process  Denied any hallucinations or delusions  CN II-XII - grossly intact  Lifting all limbs antigravity with no drift noticed  Normal bulk and tone  DTRs - intact      DATA:  Lab Results   Component Value Date    WBC 20.8 (H) 07/29/2021    HGB 9.5 (L) 07/29/2021    HCT 30.5 (L) 07/29/2021     07/29/2021    ALT 11 07/24/2021    AST 32 (H) 07/24/2021     07/29/2021    K 3.7 07/29/2021     07/29/2021    AMMONIA 23 07/25/2021    CREATININE 0.53 07/29/2021    BUN 9 07/29/2021    CO2 23 07/29/2021    TSH 0.68 12/23/2020    INR 1.1 07/28/2021    [id number] 699 02/03/2021    FOLATE >20.0 02/03/2021    LABA1C 5.5 04/13/2021     No results found for: CHOL  No results found for: TRIG  Lab Results   Component Value Date    HDL 42 12/23/2020    HDL 40 (L) 06/18/2019     Lab Results   Component Value Date    LDLCHOLESTEROL 134 (H) 12/23/2020    LDLCHOLESTEROL 92 06/18/2019     Lab Results   Component Value Date    VLDL NOT REPORTED 12/23/2020    VLDL NOT REPORTED 06/18/2019     Lab Results   Component Value Date    CHOLHDLRATIO 4.8 12/23/2020    CHOLHDLRATIO 3.8 06/18/2019         DIAGNOSTIC DATA:  CT HEAD (7/25/2021): Suboccipital craniectomy    MRI L-SPINE (7/23/2021): Acute compression deformity of the superior endplate of L5 with approximately 35% loss of height    ECHO (7/22/2021): EF 60-65%. Lipomatous atrial septum. No shunt seen by color Doppler    EEG (7/27/2021): Abnormal EEG secondary to generalized slowing without focal or   paroxysmal abnormality                           IMPRESSION & PLAN: 45 y.o.  female admitted with  Toxic metabolic encephalopathy in the setting of diverticulitis, abdominal abscess, sepsis, pleural effusion & intermittent hypoxia. Pt s/p LUQ drain placed with dark sanguinous drainage, +amylase. Patient is on Zosyn. Steroids held due to active infection     Patient was wide awake, A&Ox3 today, back at her baseline mentation; hemodynamically stable    Long-term history of seizure disorder; EEG - no abnormal activity. Continue Tegretol 200 mg TID; Ativan 1 mg IV for seizures > 3 minutes; seizure precautions    Back pain; H/O chronic L4-5 radiculopathy (EMG 6/2021); osteoporosis. MRI lumbar spine - acute compression deformity of L5 superior endplate. Is on baclofen 10 mg TID and lidocaine 4% patch    Peripheral neuropathy; is on Lyrica 200 mg TID     Migraine headaches; continue Topamax 50 mg BID    Livedo reticularis on the back    RLS; is on Requip 1 mg QD    Recovering alcoholic; chronic pancreatitis; been sober since 12/2020.  Mother reported that pt might have been drinking on 7/11/2021    Comorbid conditions - HTN, astrocytoma resection x 2 (1989), PTSD, depression/anxiety (is on BuSpar)     Continue PT/OT    No further neurological testing is required. Pt to F/U with Dr. Brittni Mcqueen within 4-6 weeks for seizure management    We will sign off at this time. Please, call with any questions or concerns. Thank you    Please note that this note was generated using a voice recognition dictation software. Although every effort was made to ensure the accuracy of this automated transcription, some errors in transcription may have occurred.

## 2021-07-29 NOTE — PROGRESS NOTES
PULMONARY & CRITICAL CARE MEDICINE PROGRESS NOTE     Patient:  Reddy Ness  MRN: [id number]  Admit date: 7/21/2021  Primary Care Physician: Gertrude Sheikh MD  Consulting Physician: Cassy Jewell MD  CODE Status: Full Code  LOS: 8     SUBJECTIVE     CHIEF COMPLAINT/REASON FOR INITIAL CONSULT: Acute respiratory failuree respiratory failure  BRIEF HOSPITAL COURSE:   The patient is a 45 y.o. female admitted with abdominal and back pain. She was found to have thoracic spine fracture. Her CT abdomen done on 7/27 showed a complex fluid collection 9.3 x 7.3 x 5.4 cm in the left upper quadrant and left lower lobe consolidation/pleural effusion. INTERVAL HISTORY:  07/29/21  Patient is s/p placement of left upper quadrant abdominal drain and left-sided thoracentesis on 7/28  Pleural fluid exudative in nature with neutrophilia and elevated amylase levels  Cultures no growth to date  Patient is currently on O2 Flow Rate (L/min)  Avg: 3.2 L/min  Min: 3 L/min  Max: 3.5 L/min  Reports symptomatic improvement  T-max 99.5, white count 20.8  Patient is on Zosyn    REVIEW OF SYSTEMS:  Review of Systems   Constitutional: Positive for fatigue. Negative for appetite change, chills, fever and unexpected weight change. HENT: Negative for congestion, postnasal drip, sore throat and trouble swallowing. Eyes: Negative for visual disturbance. Respiratory: Positive for cough and shortness of breath. Negative for wheezing. Cardiovascular: Negative for chest pain, palpitations and leg swelling. Gastrointestinal: Positive for abdominal pain. Negative for constipation, diarrhea, nausea and vomiting. Genitourinary: Negative for difficulty urinating, dysuria and frequency. Musculoskeletal: Negative for arthralgias and joint swelling. Skin: Negative for rash. Allergic/Immunologic: Negative for immunocompromised state. Neurological: Positive for weakness. Negative for dizziness, speech difficulty and headaches. Hematological: Negative for adenopathy. Psychiatric/Behavioral: Positive for confusion. Negative for behavioral problems and sleep disturbance.      OBJECTIVE     PaO2/FiO2 RATIO:  Recent Labs     21  1538   POCPO2 71.9*      FiO2 : (S) 50 %     VITAL SIGNS:   LAST:  /80   Pulse 96   Temp 98.6 °F (37 °C) (Oral)   Resp 22   Ht 5' 5\" (1.651 m)   Wt 142 lb 9.6 oz (64.7 kg)   SpO2 97%   BMI 23.73 kg/m²   8-24 HR RANGE:  TEMP Temp  Av.1 °F (37.3 °C)  Min: 98.6 °F (37 °C)  Max: 99.5 °F (24.6 °C)   BP Systolic (30FKG), BFA:375 , Min:104 , CVD:255      Diastolic (42VNA), BFQ:03, Min:61, Max:95     PULSE Pulse  Av.3  Min: 83  Max: 103   RR Resp  Av.2  Min: 17  Max: 27   O2 SAT SpO2  Av.9 %  Min: 95 %  Max: 100 %   OXYGEN DELIVERY O2 Flow Rate (L/min)  Avg: 3.2 L/min  Min: 3 L/min  Max: 3.5 L/min        SYSTEMIC EXAMINATION:   General appearance - Ill-appearing, mild  respiratory distress  Mental status - awake & alert, follows commands  Eyes - pupils equal and reactive, sclera anicteric  Mouth - mucous membranes moist  Neck - supple, no significant adenopathy, carotids upstroke normal bilaterally, no bruits  Chest - Breath sounds bilaterally were dimnished to auscultation at bases  Heart - normal rate, regular rhythm, normal S1, S2, no murmurs, rubs, clicks or gallops  Abdomen - soft, left upper quadrant tenderness, no masses or organomegaly  Neurological - non-focal  Extremities - peripheral pulses normal, no pedal edema, no clubbing or cyanosis  Skin - normal coloration and turgor, no rashes, no suspicious skin lesions noted     DATA REVIEW     Medications: Current Inpatient  Scheduled Meds:   guaiFENesin  600 mg Oral BID    nicotine  1 patch Transdermal Daily    lidocaine  1 patch Transdermal Daily    [Held by provider] spironolactone  50 mg Oral Daily    ipratropium-albuterol  1 ampule Inhalation 4x daily    polyethylene glycol  17 g Oral BID    carBAMazepine  200 mg Oral TID    topiramate  50 mg Oral BID    amLODIPine  5 mg Oral Daily    baclofen  10 mg Oral TID    budesonide-formoterol  2 puff Inhalation BID    busPIRone  15 mg Oral TID    ferrous sulfate  325 mg Oral BID    folic acid  1 mg Oral Daily    potassium chloride  20 mEq Oral Daily with breakfast    pregabalin  200 mg Oral TID    rOPINIRole  1 mg Oral Nightly    sodium chloride flush  5-40 mL Intravenous 2 times per day    enoxaparin  40 mg Subcutaneous Daily    piperacillin-tazobactam  3,375 mg Intravenous Q8H     Continuous Infusions:   lactated ringers 100 mL/hr at 07/29/21 0217    sodium chloride 25 mL (07/24/21 1803)       INPUT/OUTPUT:  In: 4618 [P.O.:120; I.V.:3650]  Out: 1340 [Urine:1250; Drains:90]  Date 07/29/21 0000 - 07/29/21 2359   Shift 3849-0692 6252-1605 8296-0559 24 Hour Total   INTAKE   I.V.(mL/kg) 9375(98.2)   8109(50.9)   Shift Total(mL/kg) 0587(06.9)   3589(85.5)   OUTPUT   Urine(mL/kg/hr) 800(1.5) 150  950   Drains(mL/kg) 20(0.3)   20(0.3)   Shift Total(mL/kg) 820(12.7) 150(2.3)  970(15)   Weight (kg) 64.7 64.7 64.7 64.7        LABS:  ABGs:   Recent Labs     07/26/21  1538   POCPH 7.467*   POCPCO2 44.7   POCPO2 71.9*   POCHCO3 32.3*   [id number] 95     CBC:   Recent Labs     07/27/21  0511 07/28/21  0453 07/28/21  1706 07/29/21  0320   WBC 19.4* 17.0*  --  20.8*   HGB 11.9 8.5* 9.8* 9.5*   HCT 37.9 27.9* 31.2* 30.5*   MCV 93.3 96.5  --  93.0    307  --  377   LYMPHOPCT 6* 12*  --  14*   RBC 4.06 2.89*  --  3.28*   MCH 29.3 29.4  --  29.0   MCHC 31.4 30.5  --  31.1   RDW 13.3 13.3  --  13.3     CRP:   Recent Labs     07/27/21  1230   .7*     LDH:   No results for input(s): LDH in the last 72 hours.   BMP:   Recent Labs     07/27/21  0511 07/28/21  0453 07/29/21  0320    135 138   K 3.2* 3.7 3.7   CL 99 101 104   CO2 26 15* 23   BUN 18 11 9   CREATININE 0.68 0.34* 0.53   GLUCOSE 160* 200* 89     Liver Function Test:   No results for input(s): PROT, LABALBU, ALT, AST, GGT, ALKPHOS, BILITOT in the last 72 hours. Coagulation Profile:   Recent Labs     07/28/21  0159 07/28/21  0453   INR 1.1 1.1   PROTIME 11.9 11.9   APTT  --  27.6     D-Dimer:  No results for input(s): DDIMER in the last 72 hours. Lactic Acid:  No results for input(s): LACTA in the last 72 hours. Cardiac Enzymes:  No results for input(s): CKTOTAL, CKMB, CKMBINDEX, TROPONINI in the last 72 hours. Invalid input(s): TROPONIN, HSTROP  BNP/ProBNP:   No results for input(s): BNP, PROBNP in the last 72 hours. Triglycerides:  No results for input(s): TRIG in the last 72 hours. Microbiology:  Urine Culture:  No components found for: CURINE  Blood Culture:  No components found for: CBLOOD, CFUNGUSBL  Sputum Culture:  No components found for: [place] 11     07/28/21  1151 07/28/21  1151 07/28/21  1152   SPECDESC . PLEURAL FLUID   < > . PLEURAL FLUID   SPECIAL NOT REPORTED  --   --    CULTURE NO GROWTH 21 HOURS  --   --     < > = values in this interval not displayed. Recent Labs     07/28/21  1136 07/28/21  1136 07/28/21  1140 07/28/21  1151 07/28/21  1152   SPECDESC . ASPIRATE ABDOMINAL ABSCESS   < > .ASPIRATE . ABSCESS . PLEURAL FLUID . PLEURAL FLUID   SPECIAL NOT REPORTED  --  NOT REPORTED NOT REPORTED  --    CULTURE DUPLICATE ORDER  --  PENDING NO GROWTH 21 HOURS  --     < > = values in this interval not displayed. Pathology:    Radiology Reports:  XR CHEST (SINGLE VIEW FRONTAL)   Final Result   Pulmonary congestion with bibasilar effusions and adjacent infiltrates. IR GUIDED THORACENTESIS PLEURAL   Final Result   Successful ultrasound guided thoracentesis. CT ABSCESS DRAINAGE   Final Result   Successful percutaneous ultrasound and CT guided placement of 8 [place] left   upper quadrant drainage catheter.       New high attenuation material within the multi loculated left upper quadrant   fluid collection suggests leakage from the adjacent abnormal colonic segment   indicating micro perforation. The above findings were called by Dr. Vidya Blackman MD to Dr. Laura Louise On 7/28/2021 at 12:32. CT ABDOMEN PELVIS W IV CONTRAST Additional Contrast? Radiologist Recommendation   Final Result   1. Large complex fluid collection in the left upper quadrant, concerning for   abscess. This is likely related to the previously described colitis. 2. Consolidation and ground-glass opacity in both lower lobes with left   pleural effusion, concerning for multifocal pneumonia. 3. Diffuse urinary bladder wall thickening. This could be due to   underdistention, but correlation for any signs or symptoms of cystitis is   recommended. 4. Chronic pancreatitis. 5. Nonobstructing left nephrolithiasis. XR CHEST (SINGLE VIEW FRONTAL)   Final Result   Interval improvement in still mild interstitial edema with bilateral   asymmetric, left greater than right, pleural effusions         CT HEAD WO CONTRAST   Final Result   No acute intracranial abnormality. Suboccipital craniectomy. XR CHEST PORTABLE   Final Result   1. Increasing vascular congestion and left perihilar opacity. 2.  Pleural effusions and basilar opacities are otherwise without significant   change. XR ACUTE ABD SERIES CHEST 1 VW   Final Result   Bilateral airspace disease and pleural effusions. Findings in the upper   lobes appear increased compared to prior, left greater than right. Findings   may be related to asymmetric edema with superimposed pneumonia not excluded. Gas and stool are seen throughout nondilated bowel loops with few nonspecific   air-fluid levels in the right lower abdomen, likely in small bowel. Findings   may be physiologic or related to mild ileus. No evidence of obstruction or   free air. XR LUMBAR SPINE (2-3 VIEWS)   Final Result   Lumbar spine: Superior endplate fracture L5 which appears acute to subacute. No subluxation. Other vertebra well maintained. Spine significant compression deformity T6 with there are endplate loss in   height T8, T9 and T10. No subluxation. Mild levo scoliotic curvature. Osteopenia complicates assessment. Pedicles are intact. Low lung volumes   complicate assessment. There is suggestion of pleural effusions. XR THORACIC SPINE (3 VIEWS)   Final Result   Lumbar spine: Superior endplate fracture L5 which appears acute to subacute. No subluxation. Other vertebra well maintained. Spine significant compression deformity T6 with there are endplate loss in   height T8, T9 and T10. No subluxation. Mild levo scoliotic curvature. Osteopenia complicates assessment. Pedicles are intact. Low lung volumes   complicate assessment. There is suggestion of pleural effusions. MRI THORACIC SPINE WO CONTRAST   Final Result   1. Limited examination. 2. Compression deformities involving T6, T8, T9 and T11. These are likely   chronic in nature. Diffusely decreased T1 and T2 marrow signal within the T6   vertebral body compatible with the sclerosis seen on the prior CT. 3. There is minimal bone marrow edema involving the left lateral elements of   T6 and T7. Unclear whether this is degenerative in nature or perhaps   sequelae of recent or remote trauma. 4. No significant spinal canal stenosis of the thoracic spine. 5. Moderate bilateral neural foraminal narrowing at T6-T7.   6. Bilateral pleural effusions, left greater than right. MRI LUMBAR SPINE WO CONTRAST   Final Result   1. Acute compression deformity of the superior endplate of L5 with   approximately 35% loss of height. No significant bulging of the posterior   cortex. 2. No significant spinal canal stenosis of the lumbar spine. 3. Neural foraminal narrowing at L3-L4 through L5-S1.   4. Minimal retrolisthesis at L5-S1.          XR CHEST PORTABLE   Final Result   Interval development of bibasilar infiltrates and small pleural effusions   which could represent dependent edema, pneumonia or aspiration. XR ABDOMEN (KUB) (SINGLE AP VIEW)   Final Result   Mild dilation of the right hemicolon and patchy small bowel gas most likely   due to ileus. XR CHEST PORTABLE   Final Result   No radiologic evidence of acute cardiopulmonary disease. CT CHEST ABDOMEN PELVIS W CONTRAST   Final Result   Probable acute diverticulitis or colitis left colon. No evidence of   perforation or abscess at this time. Chronic pancreatitis. Multiple compression fractures some of which are new since the previous exam.      Lingular ground-glass infiltrate. Recommend follow-up to resolution. Intra-atrial lipoma right atrium. Cardiac echo may be helpful. CT LUMBAR SPINE TRAUMA RECONSTRUCTION   Final Result   CT thoracic spine:      1. Decrease in height in T6 compared to prior study compatible with   worsening compression with subacute etiology and mild protrusion of the   dorsal aspect of T6. Narrowed T6-T7 neural foramina. 2.  Chronic superior height T8, T9, and T11 without interval change from   prior study. 3.  Mild prevertebral soft tissue prominence T6 without evidence of abscess   formation. CT lumbar spine:      1. No traumatic malalignment. 2.  Compression fracture superior L5 with subacute appearance. 3.  Calcifications in the abdomen incompletely included appearance suggesting   pancreatic calcifications. RECOMMENDATIONS:   Please reference CT chest, abdomen and pelvis of same day. CT THORACIC SPINE TRAUMA RECONSTRUCTION   Final Result   CT thoracic spine:      1. Decrease in height in T6 compared to prior study compatible with   worsening compression with subacute etiology and mild protrusion of the   dorsal aspect of T6. Narrowed T6-T7 neural foramina. 2.  Chronic superior height T8, T9, and T11 without interval change from   prior study.       3.  Mild prevertebral soft tissue prominence T6 without evidence of abscess   formation. CT lumbar spine:      1. No traumatic malalignment. 2.  Compression fracture superior L5 with subacute appearance. 3.  Calcifications in the abdomen incompletely included appearance suggesting   pancreatic calcifications. RECOMMENDATIONS:   Please reference CT chest, abdomen and pelvis of same day. XR THORACIC SPINE (2 VIEWS)    (Results Pending)   XR LUMBAR SPINE (2-3 VIEWS)    (Results Pending)        Echocardiogram:   Results for orders placed during the hospital encounter of 07/21/21    ECHO Complete 2D W Doppler W Color    Summary  Left ventricle is normal in size. Global left ventricular systolic function  is difficult to assess due to heart rate but appears normal. Calculated  ejection fraction 70% by Guadarrama's method. Visually estimated EF 60-65%. Left atrium is normal in size. Lipomatous atrial septum. No shunt seen by color Doppler. Normal right ventricular size and function. No significant valvular regurgitation or stenosis seen. ASSESSMENT AND PLAN     Assessment:    // Acute hypoxic respiratory failure  // Severe sepsis  // Acute metabolic encephalopathy  // Intra-abdominal abscess, s/p IR guided drainage 7/28  // Left pleural effusion/compressive atelectasis, s/p thoracentesis, pleural fluid exudative, with neutrophilic predominance and elevated amylase levels  // COPD, severity to be determined  // Leukocytosis  // Compression fracture of thoracic vertebrae  // Seizure disorder  // Essential hypertension  // Hyperlipidemia  // Chronic pancreatitis  // Alcohol abuse  // Tobacco abuse    Plan:    I personally interviewed/examined the patient; reviewed interval history, interpreted all available radiographic and laboratory data at the time of service.     Patient is hemodynamically stable and is currently saturating well on nasal cannula  Continue nocturnal and as needed BiPAP  Continue supplemental oxygen to keep oxygen saturation >90%  Encourage incentive spirometry  Continue pulmonary toilet, aspiration precautions and bronchodilators  Monitor I/O, electrolytes with a goal of even/negative fluid balance  Monitor output from left upper quadrant drain  Stress ulcer prophylaxis  Chemical DVT prophylaxis as per protocol  Antimicrobials reviewed; continue Zosyn as per ID recommendation, follow-up culture results  Skin/wound care reviewed with the nursing staff  Physical/occupational therapy    I would like to thank you for allowing me to participate in the care of this patient. Please feel free to call with any further questions or concerns. Orly Wong MD  Pulmonary and Critical Care Medicine           7/29/2021     Please note that this chart was generated using voice recognition Dragon dictation software. Although every effort was made to ensure the accuracy of this automated transcription, some errors in transcription may have occurred.

## 2021-07-29 NOTE — FLOWSHEET NOTE
Assessment: Patient is a 45 y.o. female who arrived to the hospital due to \"sepsis. \" Patient was sitting up in hospital bed, with nurse and respiratory therapist present in room, at time of visit. Intervention:  visited patient per patient request. Rosina Sahu is familiar with patient from previous hospitalizations.  provided support and comfort throughout encounter. Patient shared about her hospitalization and the support she has in her mother.  and patient reminisced and patient was in good spirits throughout encounter. Outcome: Patient was receptive of 's visit and support. Plan: Chaplains can make follow-up visit, per request. Hien Bennett can be reached 24/7 via [employer].     Hope Tan     07/29/21 8577   Encounter Summary   Services provided to: Patient   Referral/Consult From: Patient   Support System Parent   Contact [facility]   (7/28/2021)   Complexity of Encounter Low   Length of Encounter 15 minutes   Spiritual Assessment Completed Yes   Routine   Type Follow up   Spiritual/Hindu   Type Spiritual support   Assessment Approachable   Intervention Sustaining presence/ Ministry of presence   Outcome Receptive

## 2021-07-29 NOTE — PROCEDURES
[address] [facility] [address] [place] REPORT      Patient Name: Antoinette Godinez  Dates recorded: 7/29/2021  MRN: [id number]    Electroencephalographer: Richar Torres MD       CLINICAL DETAILS:  This EEG was performed on this 45 y. o.yo female admitted for sepsis AMS    MEDICATIONS:   Current Facility-Administered Medications:     magnesium sulfate 1000 mg in dextrose 5% 100 mL IVPB, 1,000 mg, Intravenous, PRN, LOI Concepcion CNP, Last Rate: 100 mL/hr at 07/29/21 1737, 1,000 mg at 07/29/21 1737    guaiFENesin (MUCINEX) extended release tablet 600 mg, 600 mg, Oral, BID, Jovani Carcamo PA-C, 600 mg at 07/29/21 1236    nicotine (NICODERM CQ) 21 MG/24HR 1 patch, 1 patch, Transdermal, Daily, Mulu Bolanos PA-C, 1 patch at 07/29/21 1234    fentaNYL (SUBLIMAZE) injection 25 mcg, 25 mcg, Intravenous, Q2H PRN, 25 mcg at 07/29/21 1735 **OR** fentaNYL (SUBLIMAZE) injection 50 mcg, 50 mcg, Intravenous, Q2H PRN, Michelle Longoria MD, 50 mcg at 07/29/21 0217    potassium chloride (KLOR-CON M) extended release tablet 40 mEq, 40 mEq, Oral, PRN **OR** potassium bicarb-citric acid (EFFER-K) effervescent tablet 40 mEq, 40 mEq, Oral, PRN **OR** potassium chloride 10 mEq/100 mL IVPB (Peripheral Line), 10 mEq, Intravenous, PRN, Jovani Carcamo PA-C    lidocaine 4 % external patch 1 patch, 1 patch, Transdermal, Daily, Michelle Longoria MD, 1 patch at 07/29/21 0911    LORazepam (ATIVAN) injection 1 mg, 1 mg, Intravenous, Q6H PRN, LOI Guo CNP    lactated ringers infusion, , Intravenous, Continuous, Waynetta Cheadle, MD, Last Rate: 100 mL/hr at 07/29/21 0217, New Bag at 07/29/21 0217    [Held by provider] spironolactone (ALDACTONE) tablet 50 mg, 50 mg, Oral, Daily, Jona Zimmer MD, 50 mg at 07/28/21 1148    ipratropium-albuterol (DUONEB) nebulizer solution 1 ampule, 1 ampule, Inhalation, 4x daily, Jimbo Shah MD, 1 ampule at 07/29/21 1220    polyethylene glycol (GLYCOLAX) packet 17 g, 17 g, Oral, BID, Mikhail Robetrson MD, 17 g at 07/29/21 1741    carBAMazepine (TEGRETOL) tablet 200 mg, 200 mg, Oral, TID, Malinda MASTERS MD, 200 mg at 07/29/21 1529    hyoscyamine (LEVSIN/SL) sublingual tablet 125 mcg, 125 mcg, Oral, Q4H PRN, Fannie Dobbins APRN - CNP, 125 mcg at 07/24/21 1514    topiramate (TOPAMAX) tablet 50 mg, 50 mg, Oral, BID, Mikhail Robertson MD, 50 mg at 07/29/21 0910    hydrOXYzine (ATARAX) tablet 25 mg, 25 mg, Oral, TID PRN, Mikhail Robertson MD, 25 mg at 07/28/21 2218    bisacodyl (DULCOLAX) EC tablet 5 mg, 5 mg, Oral, Daily PRN, Carlos Monsivais, APRN - CNP, 5 mg at 07/23/21 1754    albuterol (PROVENTIL) nebulizer solution 2.5 mg, 2.5 mg, Nebulization, As Directed RT PRN, Murray Duke APRN - CNP, 2.5 mg at 07/24/21 1244    amLODIPine (NORVASC) tablet 5 mg, 5 mg, Oral, Daily, Murray Duke APRN - CNP, 5 mg at 07/29/21 0910    baclofen (LIORESAL) tablet 10 mg, 10 mg, Oral, TID, Murray Duke, APRN - CNP, 10 mg at 07/29/21 1529    budesonide-formoterol (SYMBICORT) 160-4.5 MCG/ACT inhaler 2 puff, 2 puff, Inhalation, BID, Murray Duke APRN - CNP, 2 puff at 07/29/21 0900    busPIRone (BUSPAR) tablet 15 mg, 15 mg, Oral, TID, Murray Duke APRN - CNP, 15 mg at 07/29/21 1529    ferrous sulfate (FE TABS 325) EC tablet 325 mg, 325 mg, Oral, BID, Murray Duke, APRN - CNP, 325 mg at 30/22/02 5615    folic acid (FOLVITE) tablet 1 mg, 1 mg, Oral, Daily, Clora Srikanth, APRN - CNP, 1 mg at 07/29/21 0910    potassium chloride (KLOR-CON M) extended release tablet 20 mEq, 20 mEq, Oral, Daily with breakfast, LOI Church CNP, 20 mEq at 07/29/21 0910    pregabalin (LYRICA) capsule 200 mg, 200 mg, Oral, TID, LOI Church CNP, 200 mg at 07/29/21 1529    rOPINIRole (REQUIP) tablet 1 mg, 1 mg, Oral, Nightly, LOI Church CNP, 1 mg at 07/28/21 2219    traZODone (DESYREL) tablet 50 mg, 50 mg, Oral, Nightly PRN, Mirella Curd, APRN - CNP, 50 mg at 07/28/21 2218    sodium chloride flush 0.9 % injection 5-40 mL, 5-40 mL, Intravenous, 2 times per day, Mirella Curd, APRN - CNP, 10 mL at 07/29/21 0912    sodium chloride flush 0.9 % injection 5-40 mL, 5-40 mL, Intravenous, PRN, Mirella Curd, APRN - CNP    0.9 % sodium chloride infusion, 25 mL, Intravenous, PRN, Mirella Curd, APRN - CNP, Last Rate: 100 mL/hr at 07/24/21 1803, 25 mL at 07/24/21 1803    enoxaparin (LOVENOX) injection 40 mg, 40 mg, Subcutaneous, Daily, Mirella Curd, APRN - CNP, 40 mg at 07/29/21 0911    acetaminophen (TYLENOL) tablet 650 mg, 650 mg, Oral, Q6H PRN, 650 mg at 07/27/21 1901 **OR** acetaminophen (TYLENOL) suppository 650 mg, 650 mg, Rectal, Q6H PRN, Mirella Curd, APRN - CNP    piperacillin-tazobactam (ZOSYN) 3,375 mg in dextrose 5 % 50 mL IVPB extended infusion (mini-bag), 3,375 mg, Intravenous, Q8H, Mirella Curd, APRN - CNP, Stopped at 07/29/21 1430    [Held by provider] oxyCODONE-acetaminophen (PERCOCET) 5-325 MG per tablet 1 tablet, 1 tablet, Oral, Q4H PRN, 1 tablet at 07/24/21 0254 **OR** [DISCONTINUED] oxyCODONE-acetaminophen (PERCOCET) 5-325 MG per tablet 2 tablet, 2 tablet, Oral, Q4H PRN, Rachel Borden, APRN - NP, 2 tablet at 07/23/21 1521    melatonin tablet 3 mg, 3 mg, Oral, Nightly PRN, Mirella Curd, APRN - CNP, 3 mg at 07/22/21 0242      TECHNICAL DETAILS:  Continuous video-EEG monitoring was performed with 27 surface scalp electrodes placed according to the International 10-20 electrode placement system, using a 32-channel [employer] headbox. All EEG and video information was acquired digitally, including the use of automated spike and seizure detection software to detect epileptiform activity. An event button was also available to be depressed during clinical events. RESULTS:  Duration 22 minutes  This was an abnormal EEG due to:  1.  Continuous generalized attenuated 5-7 slowing of the background with occasional faster frequencies that shows reactivity. Patient is poorly cooperative and there is a large amount of muscle tension artifact  2. No clear anterior-posterior gradient  3.  No sleep is identified      ACTIVATION PROCEDURES  Hyperventilation and photic stimulation: not performed patient not sufficiently cooperative    EKG  A single EKG channel showed a generally regular rhythm throughout the recording    COMPARISON  None      IMPRESSION   abnormal EEG secondary to generalized slowing without focal or paroxysmal abnormality      Lana Ribeiro MD    Diplomate, American Board of Psychiatry and Neurology  Fellow, [address] [facility] Academy of Neurology  Member, American Neurological Association

## 2021-07-29 NOTE — PROGRESS NOTES
oOccupational Therapy  Facility/Department: [facility] CAR 2  Daily Treatment Note  NAME: Abran Yeboah  : 1970  MRN: [id number]    Date of Service: 2021    Discharge Recommendations:  Patient would benefit from continued therapy after discharge  OT Equipment Recommendations  Walker: Rolling  ADL Assistive Devices: Shower Chair with back    Assessment   Performance deficits / Impairments: Decreased functional mobility ; Decreased ADL status; Decreased strength;Decreased safe awareness;Decreased cognition;Decreased endurance;Decreased balance;Decreased high-level IADLs;Decreased coordination  Prognosis: Fair  Patient Education: Pt ed on OT role, importance of continued participation in therapy, benefits of OOB activity-Good return  REQUIRES OT FOLLOW UP: Yes  Activity Tolerance  Activity Tolerance: Patient Tolerated treatment well;Patient limited by fatigue  Safety Devices  Safety Devices in place: Yes  Type of devices: Call light within reach;Gait belt;Patient at risk for falls;Nurse notified (Left in w/c)  Restraints  Initially in place: No         Patient Diagnosis(es): The primary encounter diagnosis was Septicemia ([address] Diagnoses of Compression fracture of thoracic vertebra, initial encounter, unspecified thoracic vertebral level (HCC) and Compression fracture of lumbar vertebra, initial encounter, unspecified lumbar vertebral level ([address] were also pertinent to this visit.       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 has a past surgical history that includes Hysterectomy (2003); Brain tumor excision (1989); fracture surgery (Left, 7-3-13); fracture surgery (Right); Upper gastrointestinal endoscopy (N/A, 10/22/2020); Colonoscopy (N/A, 10/22/2020); Endoscopic ultrasonography, GI (N/A, 12/9/2020); Upper gastrointestinal endoscopy (N/A, 4/5/2021); Hand surgery; and CT ABSCESS DRAINAGE (7/28/2021). Restrictions  Restrictions/Precautions  Restrictions/Precautions: Fall Risk, Up as Tolerated, Seizure  Required Braces or Orthoses?: No  Position Activity Restriction  Other position/activity restrictions: no activity restrictions per Otto Chung with NS. Maintain SpO2 >94%, O2 NC 4 Lm. up with assist  Subjective   General  Chart Reviewed: Yes  Patient assessed for rehabilitation services?: Yes  Family / Caregiver Present: No  General Comment  Comments: RN ok'd pt for OT tx this date. Pt agreeable to session and pleasant/cooperative throughout  Pain Assessment  Pain Level: 7  Pain Type: Acute pain  Pain Location: Back  Non-Pharmaceutical Pain Intervention(s): Ambulation/Increased Activity; Distraction; Therapeutic presence  Vital Signs  Patient Currently in Pain: Yes   Orientation  Orientation  Overall Orientation Status: Within Functional Limits  Objective    ADL  Grooming: Stand by assistance;Setup;Verbal cueing; Increased time to complete (Pt washed hair with shower cap sitting in w/c)  UE Dressing: Minimal assistance;Setup; Increased time to complete (Min A to adjust gown sitting in w/c)  LE Dressing: Moderate assistance;Setup; Increased time to complete (To don socks sitting EOB, Mod A d/t decreased trunk flexion)        Balance  Sitting Balance: Stand by assistance (Seated EOB, in w/c)  Standing Balance: Minimal assistance  Standing Balance  Time: Approx 4 min  Activity: Static standing EOB, func mobility around room into w/c  Comment: No AD utilized, no LOB noted, CGA-Min A for balance. RW recommended for longer distances  Functional Mobility  Functional - Mobility Device: No device  Activity: Other  Assist Level: Minimal assistance  Wheelchair Bed Transfers  Wheelchair/Bed - Technique: Ambulating  Equipment Used: Wheelchair;Bed  Level of Asssistance: Minimal assistance  Wheelchair Transfers Comments: No LOB noted, Min A req for multiple line mgmt  Bed mobility  Supine to Sit: Minimal assistance  Scooting: Contact guard assistance  Comment: Min A for trunk progression  Transfers  Sit to stand: Minimal assistance  Stand to sit: Minimal assistance  Transfer Comments: Verbal/tactile cues for hand placement with fair return. Cognition  Overall Cognitive Status: Exceptions  Arousal/Alertness: Appropriate responses to stimuli  Following Commands:  Follows multistep commands consistently  Attention Span: Attends with cues to redirect  Memory: Decreased recall of recent events  Safety Judgement: Decreased awareness of need for assistance;Decreased awareness of need for safety  Problem Solving: Assistance required to identify errors made;Assistance required to correct errors made  Insights: Decreased awareness of deficits  Initiation: Requires cues for some  Sequencing: Requires cues for some     Plan   Plan  Times per week: 3-5 x/wk  Current Treatment Recommendations: Strengthening, Balance Training, Functional Mobility Training, Endurance Training, Cognitive Reorientation, Pain Management, Safety Education & Training, Patient/Caregiver Education & Training, Equipment Evaluation, Education, & procurement, Self-Care / ADL  AM-PAC Score        AM-[facility] Inpatient Daily Activity Raw Score: 15 (07/29/21 1458)  AM-PAC Inpatient ADL T-Scale Score : 34.69 (07/29/21 1458)  ADL Inpatient CMS 0-100% Score: 56.46 (07/29/21 1458)  ADL Inpatient CMS G-Code Modifier : CK (07/29/21 1458)    Goals  Short term goals  Time Frame for Short term goals: By discharge, pt will:  Short term goal 1: Demo Mod I for bed mobility to increase independence with ADLs and decrease risk for pressure injury  Short term goal 2: Demo Mod I with setup and increased time PRN for UB ADLs and grooming tasks  Short term goal 3: Demo CGA with setup and AE PRN for LB ADLs and toileting tasks  Short term goal 4: Demo 8+ minutes dynamic standing and reaching task to increase balance for safety and independence with ADLs/IADLs  Short term goal 5: Demo SBA for functional transfers and functional mobility with use of LRD PRN  Short term goal 6: Follow 3-step functional task to increase cognition for participation in ADLs/IADLs       Therapy Time   Individual Concurrent Group Co-treatment   Time In 0959         Time Out 1056         Minutes 57         Timed Code Treatment Minutes: 62 Minutes   Pt in bed upon arrival, pleasant and agreeable to tx this date. Pt retired to w/c at end of tx, call light within reach, RN notified.      TIM Mcginnis/TIANNA

## 2021-07-29 NOTE — PROGRESS NOTES
the leukocytosis and rash. On off - unclear if present x long time PTA since she lives by herself and the rash is only on the back and post right arm. She had fevers at admission and is down to LGF since    On exam she is very anxious and ox 3 - easily emotional              Interval changes  7/29/2021   Patient Vitals for the past 8 hrs:   BP Temp Temp src Pulse Resp SpO2 Weight   07/29/21 0807 -- 99.3 °F (37.4 °C) Oral -- -- -- --   07/29/21 0801 -- -- -- 90 18 95 % --   07/29/21 0800 (!) 141/85 99.3 °F (37.4 °C) Oral 89 19 96 % --   07/29/21 0759 -- -- -- 87 17 96 % --   07/29/21 0758 -- -- -- 88 18 96 % --   07/29/21 0600 -- -- -- -- -- -- 142 lb 9.6 oz (64.7 kg)     T max 101 on 7/28 -  No fever since    Left LQ less tender  LBP better  Less anxious and not SOB  Seems to be back to her own self, asking appropriate questions    CT AP show 7/27 left abd collection, and left pleura moderate size effusion - GS note appreciated    IR drainage: 7/28/21  · Pleural: PH   7.5, prot 3.6, LDH 2125, amylase 4100, WBC <3000, RBC 2125. cx pend neg    · abd fluid collection drain placed and fluid is serosang, , RBC 45937, amylase 404, ,  cx pend neg    Labs reviewed    Summary of relevant labs:  Labs:  W 19 - 17 - 19 - 17 - 20  Creat  0.68   - 269 - 291  Procalcitonin0.16   Lactic Acid 8.7    Micro:   BC 7/27    Imaging:  CXR 7/27  Interval improvement in still mild interstitial edema with bilateral   asymmetric, left greater than right, pleural effusions       CT AP 7/27  Large complex fluid collection in the left upper quadrant, concerning for   abscess. This is likely related to the previously described colitis. 2. Consolidation and ground-glass opacity in both lower lobes with left   pleural effusion, concerning for multifocal pneumonia. 3. Diffuse urinary bladder wall thickening. This could be due to   underdistention, but correlation for any signs or symptoms of cystitis is   recommended. 4. Chronic pancreatitis. 5. Nonobstructing left nephrolithiasis. CT head 7/25  No acute intracranial abnormality. Suboccipital craniectomy. CT AP 7/21   Probable acute diverticulitis or colitis left colon. No evidence of perforation or abscess at this time. Chronic pancreatitis. Multiple compression fractures some of which are new since the previous exam.   Lingular ground-glass infiltrate. Recommend follow-up to resolution. Intra-atrial lipoma right atrium. Cardiac echo may be helpful. I have personally reviewed the past medical history, past surgical history, medications, social history, and family history, and I haveupdated the database accordingly. Allergies:   Patient has no known allergies. Review of Systems:     Review of Systems   Constitutional: Negative for activity change, appetite change, chills, diaphoresis and fatigue. HENT: Negative for congestion and rhinorrhea. Eyes: Negative for pain and discharge. Respiratory: Negative for apnea. Cardiovascular: Negative for chest pain. Gastrointestinal: Positive for abdominal pain. Negative for abdominal distention. Endocrine: Negative for heat intolerance and polyphagia. Genitourinary: Negative for dysuria and flank pain. Musculoskeletal: Negative for arthralgias. Skin: Positive for rash. Negative for color change. Allergic/Immunologic: Negative for immunocompromised state. Neurological: Positive for tremors. Negative for dizziness and headaches. Hematological: Negative for adenopathy. Psychiatric/Behavioral: Negative for agitation. The patient is not nervous/anxious. Physical Examination :       Physical Exam  Constitutional:       General: She is not in acute distress. Appearance: Normal appearance. She is not ill-appearing, toxic-appearing or diaphoretic. HENT:      Head: Normocephalic and atraumatic.       Nose: Nose normal.      Mouth/Throat:      Mouth: Mucous membranes are moist.   Eyes:      General: No scleral icterus. Pupils: Pupils are equal, round, and reactive to light. Cardiovascular:      Rate and Rhythm: Normal rate and regular rhythm. Heart sounds: Normal heart sounds. No murmur heard. Pulmonary:      Effort: No respiratory distress. Breath sounds: Normal breath sounds. Abdominal:      General: There is no distension. Palpations: Abdomen is soft. Tenderness: There is abdominal tenderness. Right CVA tenderness: left lower quadrant. Genitourinary:     Comments: No helms  Musculoskeletal:         General: No swelling, deformity or signs of injury. Cervical back: Neck supple. No rigidity or tenderness. Skin:     General: Skin is dry. Coloration: Skin is not jaundiced or pale. Findings: No bruising or erythema. Neurological:      General: No focal deficit present. Mental Status: She is alert and oriented to person, place, and time. Cranial Nerves: No cranial nerve deficit. Sensory: No sensory deficit. Psychiatric:         Mood and Affect: Mood normal.         Thought Content:  Thought content normal.         Past Medical History:     Past Medical History:   Diagnosis Date    Acute respiratory failure with hypoxia ([address] 10/16/2020    Alcohol withdrawal syndrome, with delirium ([address] 12/14/2019    Alcoholism ([address]     Anemia 10/2020    GI bleed    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24, the patient underwent 2 surgical resections without known recurrence 10/23/2020    Closed fracture of lateral portion of left tibial plateau 27/06/3702    COPD (chronic obstructive pulmonary disease) ([address]     CO2 retainer, on Bipap at night for this, Dr. Odessa Govea ( last visit 11/20/2020 and note on chart )    Depression     bipolar, major depressive disorder, ptsd, anxiety    Dysphagia     GI bleed 10/2020    Hypertension     Memory loss     Oxygen dependent     pt stated not needed as of 12/9/2020    Pain, joint, daily    polyethylene glycol  17 g Oral BID    carBAMazepine  200 mg Oral TID    topiramate  50 mg Oral BID    amLODIPine  5 mg Oral Daily    baclofen  10 mg Oral TID    budesonide-formoterol  2 puff Inhalation BID    busPIRone  15 mg Oral TID    ferrous sulfate  325 mg Oral BID    folic acid  1 mg Oral Daily    potassium chloride  20 mEq Oral Daily with breakfast    pregabalin  200 mg Oral TID    rOPINIRole  1 mg Oral Nightly    sodium chloride flush  5-40 mL Intravenous 2 times per day    enoxaparin  40 mg Subcutaneous Daily    piperacillin-tazobactam  3,375 mg Intravenous Q8H       Social History:     Social History     Socioeconomic History    Marital status: Single     Spouse name: Not on file    Number of children: Not on file    Years of education: Not on file    Highest education level: Not on file   Occupational History    Not on file   Tobacco Use    Smoking status: Current Every Day Smoker     Packs/day: 0.50     Years: 30.00     Pack years: 15.00     Types: Cigarettes    Smokeless tobacco: Never Used    Tobacco comment: plans on quitting in the future    Vaping Use    Vaping Use: Never used   Substance and Sexual Activity    Alcohol use: Not Currently     Alcohol/week: 0.0 standard drinks    Drug use: Yes     Frequency: 2.0 times per week     Types: Marijuana    Sexual activity: Not on file   Other Topics Concern    Not on file   Social History Narrative    Not on file     Social Determinants of Health     Financial Resource Strain: Medium Risk    Difficulty of Paying Living Expenses: Somewhat hard   Food Insecurity: No Food Insecurity    Worried About Running Out of Food in the Last Year: Never true    [place] of Food in the Last Year: Never true   Transportation Needs:     Lack of Transportation (Medical):      Lack of Transportation (Non-Medical):    Physical Activity:     Days of Exercise per Week:     Minutes of Exercise per Session:    Stress:     Feeling of Stress :    Social Connections:     Frequency of Communication with Friends and Family:     Frequency of Social Gatherings with Friends and Family:     Attends Hoahaoism Services:     Active Member of Clubs or Organizations:     Attends Club or Organization Meetings:     Marital Status:    Intimate Partner Violence:     Fear of Current or Ex-Partner:     Emotionally Abused:     Physically Abused:     Sexually Abused:        Family History:     Family History   Problem Relation Age of Onset    Other Father     Cancer Maternal Grandmother     Heart Disease Paternal Grandmother     Esophageal Cancer Maternal Aunt       Medical Decision Making:   I have independently reviewed/ordered the following labs:    CBC with Differential:   Recent Labs     07/28/21  0453 07/28/21  0453 07/28/21  1706 07/29/21  0320   WBC 17.0*  --   --  20.8*   HGB 8.5*   < > 9.8* 9.5*   HCT 27.9*   < > 31.2* 30.5*     --   --  377   LYMPHOPCT 12*  --   --  14*   MONOPCT 10*  --   --  6    < > = values in this interval not displayed. BMP:  Recent Labs     07/27/21  0511 07/27/21  0511 07/28/21  0453 07/29/21  0320 07/29/21  0437      < > 135 138  --    K 3.2*   < > 3.7 3.7  --    CL 99   < > 101 104  --    CO2 26   < > 15* 23  --    BUN 18   < > 11 9  --    CREATININE 0.68   < > 0.34* 0.53  --    MG 1.6  --   --   --  1.4*    < > = values in this interval not displayed. Hepatic Function Panel: No results for input(s): PROT, LABALBU, BILIDIR, IBILI, BILITOT, ALKPHOS, ALT, AST in the last 72 hours. No results for input(s): RPR in the last 72 hours. No results for input(s): HIV in the last 72 hours. No results for input(s): BC in the last 72 hours. Lab Results   Component Value Date    CREATININE 0.53 07/29/2021    GLUCOSE 89 07/29/2021    GLUCOSE 92 04/30/2012       Detailed results: Thank you for allowing us to participate in the care of this patient. Please call with questions.     This note is created with the assistance of a speech recognition program.  While intending to generate adocument that actually reflects the content of the visit, the document can still have some errors including those of syntax and sound a like substitutions which may escape proof reading. It such instances, actual meaningcan be extrapolated by contextual diversion.     Malina Canela MD  Office: [phone]  Perfect serve / office [phone]

## 2021-07-29 NOTE — PROGRESS NOTES
0400 writer went to assess pt. Pt stated she was feeling pressure/ pain in her chest and down her arm. Physician notified, orders for labs and 12 lead EKG.  Physician came to bedside to assess pt

## 2021-07-30 ENCOUNTER — APPOINTMENT (OUTPATIENT)
Dept: CT IMAGING | Age: 51
DRG: 720 | End: 2021-07-30
Payer: MEDICARE

## 2021-07-30 ENCOUNTER — APPOINTMENT (OUTPATIENT)
Dept: ULTRASOUND IMAGING | Age: 51
DRG: 720 | End: 2021-07-30
Payer: MEDICARE

## 2021-07-30 LAB
ABSOLUTE EOS #: 0 K/UL (ref 0–0.4)
ABSOLUTE IMMATURE GRANULOCYTE: 0.25 K/UL (ref 0–0.3)
ABSOLUTE LYMPH #: 2.77 K/UL (ref 1–4.8)
ABSOLUTE MONO #: 2.27 K/UL (ref 0.1–0.8)
ANION GAP SERPL CALCULATED.3IONS-SCNC: 16 MMOL/L (ref 9–17)
BASOPHILS # BLD: 0 % (ref 0–2)
BASOPHILS ABSOLUTE: 0 K/UL (ref 0–0.2)
BUN BLDV-MCNC: 7 MG/DL (ref 6–20)
BUN/CREAT BLD: ABNORMAL (ref 9–20)
CALCIUM SERPL-MCNC: 8.4 MG/DL (ref 8.6–10.4)
CHLORIDE BLD-SCNC: 100 MMOL/L (ref 98–107)
CO2: 21 MMOL/L (ref 20–31)
CREAT SERPL-MCNC: 0.52 MG/DL (ref 0.5–0.9)
CULTURE: NORMAL
DIFFERENTIAL TYPE: ABNORMAL
EOSINOPHILS RELATIVE PERCENT: 0 % (ref 1–4)
GFR AFRICAN AMERICAN: >60 ML/MIN
GFR NON-AFRICAN AMERICAN: >60 ML/MIN
GFR SERPL CREATININE-BSD FRML MDRD: ABNORMAL ML/MIN/{1.73_M2}
GFR SERPL CREATININE-BSD FRML MDRD: ABNORMAL ML/MIN/{1.73_M2}
GLUCOSE BLD-MCNC: 78 MG/DL (ref 70–99)
HCT VFR BLD CALC: 28.8 % (ref 36.3–47.1)
HEMOGLOBIN: 9.4 G/DL (ref 11.9–15.1)
IMMATURE GRANULOCYTES: 1 %
LACTIC ACID, WHOLE BLOOD: 0.6 MMOL/L (ref 0.7–2.1)
LYMPHOCYTES # BLD: 11 % (ref 24–44)
Lab: NORMAL
MAGNESIUM: 1.5 MG/DL (ref 1.6–2.6)
MAGNESIUM: 1.9 MG/DL (ref 1.6–2.6)
MAGNESIUM: 1.9 MG/DL (ref 1.6–2.6)
MCH RBC QN AUTO: 29.9 PG (ref 25.2–33.5)
MCHC RBC AUTO-ENTMCNC: 32.6 G/DL (ref 28.4–34.8)
MCV RBC AUTO: 91.7 FL (ref 82.6–102.9)
MONOCYTES # BLD: 9 % (ref 1–7)
MORPHOLOGY: NORMAL
NRBC AUTOMATED: 0 PER 100 WBC
PDW BLD-RTO: 13.2 % (ref 11.8–14.4)
PLATELET # BLD: 412 K/UL (ref 138–453)
PLATELET ESTIMATE: ABNORMAL
PMV BLD AUTO: 9.8 FL (ref 8.1–13.5)
POTASSIUM SERPL-SCNC: 3.3 MMOL/L (ref 3.7–5.3)
POTASSIUM SERPL-SCNC: 3.9 MMOL/L (ref 3.7–5.3)
RBC # BLD: 3.14 M/UL (ref 3.95–5.11)
RBC # BLD: ABNORMAL 10*6/UL
SEG NEUTROPHILS: 79 % (ref 36–66)
SEGMENTED NEUTROPHILS ABSOLUTE COUNT: 19.91 K/UL (ref 1.8–7.7)
SODIUM BLD-SCNC: 137 MMOL/L (ref 135–144)
SPECIMEN DESCRIPTION: NORMAL
WBC # BLD: 25.2 K/UL (ref 3.5–11.3)
WBC # BLD: ABNORMAL 10*3/UL

## 2021-07-30 PROCEDURE — 6370000000 HC RX 637 (ALT 250 FOR IP): Performed by: PHYSICIAN ASSISTANT

## 2021-07-30 PROCEDURE — 6360000002 HC RX W HCPCS: Performed by: NURSE PRACTITIONER

## 2021-07-30 PROCEDURE — 83735 ASSAY OF MAGNESIUM: CPT

## 2021-07-30 PROCEDURE — 2700000000 HC OXYGEN THERAPY PER DAY

## 2021-07-30 PROCEDURE — 94761 N-INVAS EAR/PLS OXIMETRY MLT: CPT

## 2021-07-30 PROCEDURE — 99233 SBSQ HOSP IP/OBS HIGH 50: CPT | Performed by: INTERNAL MEDICINE

## 2021-07-30 PROCEDURE — 99232 SBSQ HOSP IP/OBS MODERATE 35: CPT | Performed by: NURSE PRACTITIONER

## 2021-07-30 PROCEDURE — 6370000000 HC RX 637 (ALT 250 FOR IP): Performed by: INTERNAL MEDICINE

## 2021-07-30 PROCEDURE — 2060000000 HC ICU INTERMEDIATE R&B

## 2021-07-30 PROCEDURE — 36415 COLL VENOUS BLD VENIPUNCTURE: CPT

## 2021-07-30 PROCEDURE — 87070 CULTURE OTHR SPECIMN AEROBIC: CPT

## 2021-07-30 PROCEDURE — 83605 ASSAY OF LACTIC ACID: CPT

## 2021-07-30 PROCEDURE — 94640 AIRWAY INHALATION TREATMENT: CPT

## 2021-07-30 PROCEDURE — 6370000000 HC RX 637 (ALT 250 FOR IP): Performed by: FAMILY MEDICINE

## 2021-07-30 PROCEDURE — 94668 MNPJ CHEST WALL SBSQ: CPT

## 2021-07-30 PROCEDURE — 84132 ASSAY OF SERUM POTASSIUM: CPT

## 2021-07-30 PROCEDURE — 2580000003 HC RX 258: Performed by: NURSE PRACTITIONER

## 2021-07-30 PROCEDURE — 6370000000 HC RX 637 (ALT 250 FOR IP): Performed by: STUDENT IN AN ORGANIZED HEALTH CARE EDUCATION/TRAINING PROGRAM

## 2021-07-30 PROCEDURE — 6360000004 HC RX CONTRAST MEDICATION: Performed by: INTERNAL MEDICINE

## 2021-07-30 PROCEDURE — 6370000000 HC RX 637 (ALT 250 FOR IP): Performed by: NURSE PRACTITIONER

## 2021-07-30 PROCEDURE — 80048 BASIC METABOLIC PNL TOTAL CA: CPT

## 2021-07-30 PROCEDURE — 6360000002 HC RX W HCPCS: Performed by: STUDENT IN AN ORGANIZED HEALTH CARE EDUCATION/TRAINING PROGRAM

## 2021-07-30 PROCEDURE — 2580000003 HC RX 258: Performed by: STUDENT IN AN ORGANIZED HEALTH CARE EDUCATION/TRAINING PROGRAM

## 2021-07-30 PROCEDURE — 74177 CT ABD & PELVIS W/CONTRAST: CPT

## 2021-07-30 PROCEDURE — 0W9B3ZZ DRAINAGE OF LEFT PLEURAL CAVITY, PERCUTANEOUS APPROACH: ICD-10-PCS | Performed by: PHYSICIAN ASSISTANT

## 2021-07-30 PROCEDURE — 76937 US GUIDE VASCULAR ACCESS: CPT

## 2021-07-30 PROCEDURE — 85025 COMPLETE CBC W/AUTO DIFF WBC: CPT

## 2021-07-30 PROCEDURE — 32555 ASPIRATE PLEURA W/ IMAGING: CPT

## 2021-07-30 PROCEDURE — 87205 SMEAR GRAM STAIN: CPT

## 2021-07-30 PROCEDURE — 87075 CULTR BACTERIA EXCEPT BLOOD: CPT

## 2021-07-30 RX ORDER — PANTOPRAZOLE SODIUM 40 MG/1
40 TABLET, DELAYED RELEASE ORAL
Status: DISCONTINUED | OUTPATIENT
Start: 2021-07-31 | End: 2021-08-03 | Stop reason: HOSPADM

## 2021-07-30 RX ADMIN — BUSPIRONE HYDROCHLORIDE 15 MG: 15 TABLET ORAL at 13:30

## 2021-07-30 RX ADMIN — PREGABALIN 200 MG: 100 CAPSULE ORAL at 09:15

## 2021-07-30 RX ADMIN — FERROUS SULFATE TAB EC 325 MG (65 MG FE EQUIVALENT) 325 MG: 325 (65 FE) TABLET DELAYED RESPONSE at 09:15

## 2021-07-30 RX ADMIN — IOPAMIDOL 75 ML: 755 INJECTION, SOLUTION INTRAVENOUS at 13:02

## 2021-07-30 RX ADMIN — CARBAMAZEPINE 200 MG: 200 TABLET ORAL at 13:31

## 2021-07-30 RX ADMIN — ROPINIROLE HYDROCHLORIDE 1 MG: 1 TABLET, FILM COATED ORAL at 20:09

## 2021-07-30 RX ADMIN — BACLOFEN 10 MG: 10 TABLET ORAL at 13:30

## 2021-07-30 RX ADMIN — MAGNESIUM SULFATE HEPTAHYDRATE 1000 MG: 1 INJECTION, SOLUTION INTRAVENOUS at 11:45

## 2021-07-30 RX ADMIN — BACLOFEN 10 MG: 10 TABLET ORAL at 20:10

## 2021-07-30 RX ADMIN — TRAZODONE HYDROCHLORIDE 50 MG: 50 TABLET ORAL at 20:09

## 2021-07-30 RX ADMIN — BUDESONIDE AND FORMOTEROL FUMARATE DIHYDRATE 2 PUFF: 160; 4.5 AEROSOL RESPIRATORY (INHALATION) at 20:22

## 2021-07-30 RX ADMIN — MAGNESIUM SULFATE HEPTAHYDRATE 1000 MG: 1 INJECTION, SOLUTION INTRAVENOUS at 10:20

## 2021-07-30 RX ADMIN — LORAZEPAM 1 MG: 2 INJECTION INTRAMUSCULAR; INTRAVENOUS at 09:33

## 2021-07-30 RX ADMIN — BUSPIRONE HYDROCHLORIDE 15 MG: 15 TABLET ORAL at 20:10

## 2021-07-30 RX ADMIN — SODIUM CHLORIDE, PRESERVATIVE FREE 10 ML: 5 INJECTION INTRAVENOUS at 09:26

## 2021-07-30 RX ADMIN — FENTANYL CITRATE 50 MCG: 50 INJECTION, SOLUTION INTRAMUSCULAR; INTRAVENOUS at 09:26

## 2021-07-30 RX ADMIN — POTASSIUM CHLORIDE 20 MEQ: 1500 TABLET, EXTENDED RELEASE ORAL at 09:15

## 2021-07-30 RX ADMIN — SODIUM CHLORIDE, POTASSIUM CHLORIDE, SODIUM LACTATE AND CALCIUM CHLORIDE: 600; 310; 30; 20 INJECTION, SOLUTION INTRAVENOUS at 17:38

## 2021-07-30 RX ADMIN — FENTANYL CITRATE 50 MCG: 50 INJECTION, SOLUTION INTRAMUSCULAR; INTRAVENOUS at 13:28

## 2021-07-30 RX ADMIN — ALBUTEROL SULFATE 2.5 MG: 2.5 SOLUTION RESPIRATORY (INHALATION) at 04:18

## 2021-07-30 RX ADMIN — FENTANYL CITRATE 50 MCG: 50 INJECTION, SOLUTION INTRAMUSCULAR; INTRAVENOUS at 17:36

## 2021-07-30 RX ADMIN — PIPERACILLIN AND TAZOBACTAM 3375 MG: 3; .375 INJECTION, POWDER, FOR SOLUTION INTRAVENOUS at 17:38

## 2021-07-30 RX ADMIN — CARBAMAZEPINE 200 MG: 200 TABLET ORAL at 20:10

## 2021-07-30 RX ADMIN — POTASSIUM CHLORIDE 40 MEQ: 1500 TABLET, EXTENDED RELEASE ORAL at 10:23

## 2021-07-30 RX ADMIN — PIPERACILLIN AND TAZOBACTAM 3375 MG: 3; .375 INJECTION, POWDER, FOR SOLUTION INTRAVENOUS at 09:27

## 2021-07-30 RX ADMIN — GUAIFENESIN 600 MG: 600 TABLET, EXTENDED RELEASE ORAL at 09:15

## 2021-07-30 RX ADMIN — CARBAMAZEPINE 200 MG: 200 TABLET ORAL at 09:15

## 2021-07-30 RX ADMIN — PREGABALIN 200 MG: 100 CAPSULE ORAL at 13:31

## 2021-07-30 RX ADMIN — FENTANYL CITRATE 50 MCG: 50 INJECTION, SOLUTION INTRAMUSCULAR; INTRAVENOUS at 03:40

## 2021-07-30 RX ADMIN — ENOXAPARIN SODIUM 40 MG: 40 INJECTION SUBCUTANEOUS at 09:16

## 2021-07-30 RX ADMIN — BUSPIRONE HYDROCHLORIDE 15 MG: 15 TABLET ORAL at 09:15

## 2021-07-30 RX ADMIN — BACLOFEN 10 MG: 10 TABLET ORAL at 09:15

## 2021-07-30 RX ADMIN — GUAIFENESIN 600 MG: 600 TABLET, EXTENDED RELEASE ORAL at 20:10

## 2021-07-30 RX ADMIN — TOPIRAMATE 50 MG: 25 TABLET, FILM COATED ORAL at 09:14

## 2021-07-30 RX ADMIN — FERROUS SULFATE TAB EC 325 MG (65 MG FE EQUIVALENT) 325 MG: 325 (65 FE) TABLET DELAYED RESPONSE at 20:10

## 2021-07-30 RX ADMIN — PIPERACILLIN AND TAZOBACTAM 3375 MG: 3; .375 INJECTION, POWDER, FOR SOLUTION INTRAVENOUS at 03:53

## 2021-07-30 RX ADMIN — IOHEXOL 50 ML: 240 INJECTION, SOLUTION INTRATHECAL; INTRAVASCULAR; INTRAVENOUS; ORAL at 13:02

## 2021-07-30 RX ADMIN — TOPIRAMATE 50 MG: 25 TABLET, FILM COATED ORAL at 20:09

## 2021-07-30 RX ADMIN — PREGABALIN 200 MG: 100 CAPSULE ORAL at 20:09

## 2021-07-30 RX ADMIN — FOLIC ACID 1 MG: 1 TABLET ORAL at 09:15

## 2021-07-30 RX ADMIN — IPRATROPIUM BROMIDE AND ALBUTEROL SULFATE 1 AMPULE: .5; 3 SOLUTION RESPIRATORY (INHALATION) at 20:22

## 2021-07-30 RX ADMIN — AMLODIPINE BESYLATE 5 MG: 5 TABLET ORAL at 09:14

## 2021-07-30 ASSESSMENT — ENCOUNTER SYMPTOMS
WHEEZING: 0
TROUBLE SWALLOWING: 0
EYE PAIN: 0
DIARRHEA: 0
SHORTNESS OF BREATH: 1
APNEA: 0
EYE DISCHARGE: 0
COUGH: 1
ABDOMINAL PAIN: 1
NAUSEA: 0
SORE THROAT: 0
VOMITING: 0
RHINORRHEA: 0
COLOR CHANGE: 0
ABDOMINAL DISTENTION: 0
CONSTIPATION: 0

## 2021-07-30 ASSESSMENT — PAIN SCALES - GENERAL
PAINLEVEL_OUTOF10: 7
PAINLEVEL_OUTOF10: 8
PAINLEVEL_OUTOF10: 4
PAINLEVEL_OUTOF10: 3
PAINLEVEL_OUTOF10: 4
PAINLEVEL_OUTOF10: 4

## 2021-07-30 ASSESSMENT — PAIN DESCRIPTION - PAIN TYPE: TYPE: ACUTE PAIN;CHRONIC PAIN

## 2021-07-30 ASSESSMENT — PAIN DESCRIPTION - ORIENTATION: ORIENTATION: LEFT;LOWER

## 2021-07-30 ASSESSMENT — PAIN DESCRIPTION - PROGRESSION

## 2021-07-30 ASSESSMENT — PAIN DESCRIPTION - LOCATION: LOCATION: BACK

## 2021-07-30 ASSESSMENT — PAIN DESCRIPTION - DESCRIPTORS: DESCRIPTORS: ACHING;DISCOMFORT

## 2021-07-30 ASSESSMENT — PAIN DESCRIPTION - ONSET: ONSET: ON-GOING

## 2021-07-30 ASSESSMENT — PAIN DESCRIPTION - FREQUENCY: FREQUENCY: CONTINUOUS

## 2021-07-30 NOTE — PROGRESS NOTES
Patient in room 8  Pulse ox and BP taken  LILLIANA TELLO in room  [facility] in room  Site prepped and draped  Access obtained  Draining STRAW colored fluid  TOTAL  REMOVED  Access removed  Site covered with 2x2 and tegaderm  Patient tolerated well  Stable upon transport.

## 2021-07-30 NOTE — PROGRESS NOTES
PULMONARY & CRITICAL CARE MEDICINE PROGRESS NOTE     Patient:  Ni Mccann  MRN: [id number]  Admit date: 7/21/2021  Primary Care Physician: Preeti Schulz MD  Consulting Physician: Dean Roberto MD  CODE Status: Full Code  LOS: 9     SUBJECTIVE     CHIEF COMPLAINT/REASON FOR INITIAL CONSULT: Acute respiratory failuree respiratory failure  BRIEF HOSPITAL COURSE:   The patient is a 45 y.o. female admitted with abdominal and back pain. She was found to have thoracic spine fracture. Her CT abdomen done on 7/27 showed a complex fluid collection 9.3 x 7.3 x 5.4 cm in the left upper quadrant and left lower lobe consolidation/pleural effusion. INTERVAL HISTORY:  07/30/21  Patient is s/p placement of left upper quadrant abdominal drain and left-sided thoracentesis on 7/28  Pleural fluid exudative in nature with neutrophilia and elevated amylase levels  Repeat CT abdomen shows loculated fluid collection, suspected pancreatic origin  Cultures no growth to date  Patient is currently on O2 Flow Rate (L/min)  Avg: 3 L/min  Min: 3 L/min  Max: 3 L/min  Reports symptomatic improvement  T-max 100.2, white count has bumped up to 25.2  Patient remains on Zosyn (day 10), ID following    REVIEW OF SYSTEMS:  Review of Systems   Constitutional: Positive for fatigue. Negative for appetite change, chills, fever and unexpected weight change. HENT: Negative for congestion, postnasal drip, sore throat and trouble swallowing. Eyes: Negative for visual disturbance. Respiratory: Positive for cough and shortness of breath. Negative for wheezing. Cardiovascular: Negative for chest pain, palpitations and leg swelling. Gastrointestinal: Positive for abdominal pain. Negative for constipation, diarrhea, nausea and vomiting. Genitourinary: Negative for difficulty urinating, dysuria and frequency. Musculoskeletal: Negative for arthralgias and joint swelling. Skin: Negative for rash.    Allergic/Immunologic: Negative for immunocompromised state. Neurological: Positive for weakness. Negative for dizziness, speech difficulty and headaches. Hematological: Negative for adenopathy. Psychiatric/Behavioral: Positive for confusion. Negative for behavioral problems and sleep disturbance. OBJECTIVE     PaO2/FiO2 RATIO:  No results for input(s): POCPO2 in the last 72 hours.    FiO2 : (S) 50 %     VITAL SIGNS:   LAST:  BP (!) 152/97   Pulse 90   Temp 98.6 °F (37 °C) (Oral)   Resp 15   Ht 5' 5\" (1.651 m)   Wt 142 lb 9.6 oz (64.7 kg)   SpO2 96%   BMI 23.73 kg/m²   8-24 HR RANGE:  TEMP Temp  Av.2 °F (37.3 °C)  Min: 98.6 °F (37 °C)  Max: 100.2 °F (04.6 °C)   BP Systolic (70XMA), IKI:446 , Min:136 , WEJ:172      Diastolic (91CBX), QXQ:30, Min:89, Max:99     PULSE Pulse  Av.3  Min: 90  Max: 102   RR Resp  Av.5  Min: 15  Max: 22   O2 SAT SpO2  Av %  Min: 94 %  Max: 96 %   OXYGEN DELIVERY O2 Flow Rate (L/min)  Avg: 3 L/min  Min: 3 L/min  Max: 3 L/min        SYSTEMIC EXAMINATION:   General appearance - Ill-appearing, mild  respiratory distress  Mental status - awake & alert, follows commands  Eyes - pupils equal and reactive, sclera anicteric  Mouth - mucous membranes moist  Neck - supple, no significant adenopathy, carotids upstroke normal bilaterally, no bruits  Chest - Breath sounds bilaterally were dimnished to auscultation at bases  Heart - normal rate, regular rhythm, normal S1, S2, no murmurs, rubs, clicks or gallops  Abdomen - soft, left upper quadrant tenderness, no masses or organomegaly  Neurological - non-focal  Extremities - peripheral pulses normal, no pedal edema, no clubbing or cyanosis  Skin - normal coloration and turgor, no rashes, no suspicious skin lesions noted     DATA REVIEW     Medications: Current Inpatient  Scheduled Meds:   [START ON 2021] pantoprazole  40 mg Oral QAM AC    guaiFENesin  600 mg Oral BID    nicotine  1 patch Transdermal Daily    ipratropium-albuterol  1 ampule Inhalation TID    lidocaine  1 patch Transdermal Daily    spironolactone  50 mg Oral Daily    polyethylene glycol  17 g Oral BID    carBAMazepine  200 mg Oral TID    topiramate  50 mg Oral BID    amLODIPine  5 mg Oral Daily    baclofen  10 mg Oral TID    budesonide-formoterol  2 puff Inhalation BID    busPIRone  15 mg Oral TID    ferrous sulfate  325 mg Oral BID    folic acid  1 mg Oral Daily    potassium chloride  20 mEq Oral Daily with breakfast    pregabalin  200 mg Oral TID    rOPINIRole  1 mg Oral Nightly    sodium chloride flush  5-40 mL Intravenous 2 times per day    enoxaparin  40 mg Subcutaneous Daily    piperacillin-tazobactam  3,375 mg Intravenous Q8H     Continuous Infusions:   lactated ringers 100 mL/hr at 07/29/21 0217    sodium chloride 25 mL (07/24/21 1803)       INPUT/OUTPUT:  In: -   Out: 1000 [Urine:1000]  Date 07/30/21 0000 - 07/30/21 2359   Shift 5174-5351 4226-4455 7904-4785 24 Hour Total   INTAKE   Shift Total(mL/kg)       OUTPUT   Urine(mL/kg/hr) 1000(1.9)   1000   Shift Total(mL/kg) 1000(15.5)   1000(15.5)   Weight (kg) 64.7 64.7 64.7 64.7        LABS:  ABGs:   No results for input(s): POCPH, POCPCO2, POCPO2, POCHCO3, QPWA0JDT in the last 72 hours. CBC:   Recent Labs     07/28/21  0453 07/28/21  1706 07/29/21  0320 07/30/21  0608   WBC 17.0*  --  20.8* 25.2*   HGB 8.5* 9.8* 9.5* 9.4*   HCT 27.9* 31.2* 30.5* 28.8*   MCV 96.5  --  93.0 91.7     --  377 412   LYMPHOPCT 12*  --  14* 11*   RBC 2.89*  --  3.28* 3.14*   MCH 29.4  --  29.0 29.9   MCHC 30.5  --  31.1 32.6   RDW 13.3  --  13.3 13.2     CRP:   No results for input(s): CRP in the last 72 hours. LDH:   No results for input(s): LDH in the last 72 hours.   BMP:   Recent Labs     07/28/21  0453 07/29/21  0320 07/30/21  0608    138 137   K 3.7 3.7 3.3*    104 100   CO2 15* 23 21   BUN 11 9 7   CREATININE 0.34* 0.53 0.52   GLUCOSE 200* 89 78     Liver Function Test:   No results for input(s): PROT, LABALBU, ALT, AST, GGT, ALKPHOS, BILITOT in the last 72 hours. Coagulation Profile:   Recent Labs     07/28/21  0159 07/28/21  0453   INR 1.1 1.1   PROTIME 11.9 11.9   APTT  --  27.6     D-Dimer:  No results for input(s): DDIMER in the last 72 hours. Lactic Acid:  No results for input(s): LACTA in the last 72 hours. Cardiac Enzymes:  No results for input(s): CKTOTAL, CKMB, CKMBINDEX, TROPONINI in the last 72 hours. Invalid input(s): TROPONIN, HSTROP  BNP/ProBNP:   No results for input(s): BNP, PROBNP in the last 72 hours. Triglycerides:  No results for input(s): TRIG in the last 72 hours. Microbiology:  Urine Culture:  No components found for: CURINE  Blood Culture:  No components found for: CBLOOD, CFUNGUSBL  Sputum Culture:  No components found for: [place] 11     07/28/21  1151 07/28/21  1151 07/28/21  1152   SPECDESC . PLEURAL FLUID   < > . PLEURAL FLUID   SPECIAL NOT REPORTED  --   --    CULTURE NO GROWTH 2 DAYS  --   --     < > = values in this interval not displayed. Recent Labs     07/28/21  1136 07/28/21  1136 07/28/21  1140 07/28/21  1151 07/28/21  1152   SPECDESC . ASPIRATE ABDOMINAL ABSCESS   < > .ASPIRATE . ABSCESS . PLEURAL FLUID . PLEURAL FLUID   SPECIAL NOT REPORTED  --  NOT REPORTED NOT REPORTED  --    CULTURE DUPLICATE ORDER  --  NO GROWTH 1 DAY NO GROWTH 2 DAYS  --     < > = values in this interval not displayed. Pathology:    Radiology Reports:  CT ABDOMEN PELVIS W IV CONTRAST Additional Contrast? Oral and Rectal   Final Result   Increasing multiloculated rim enhancing fluid collections in the left upper   quadrant. Slightly decreased size of the dominant pocket of fluid which now   contains a percutaneous drainage catheter, though there are innumerable new   smaller collections and other previously present smaller collections which   are increased in size. I suspect many of these do not communicate with the   pocket of fluid containing the drainage catheter. Excellent opacification of the colon with enteric contrast without definitive   enteric contrast in the left upper quadrant collection to confirm a colonic   source. Pancreatic source is a possible alternative consideration in the   appropriate clinical setting. Persistent colonic wall thickening in the region of the hepatic flexure on a   background of probable ileus. Increased urinary bladder wall thickening with slightly increased perinephric   stranding. Correlate with urinary labs for contributory urinary tract   infection. XR CHEST (SINGLE VIEW FRONTAL)   Final Result   Pulmonary congestion with bibasilar effusions and adjacent infiltrates. IR GUIDED THORACENTESIS PLEURAL   Final Result   Successful ultrasound guided thoracentesis. CT ABSCESS DRAINAGE   Final Result   Successful percutaneous ultrasound and CT guided placement of 8 [place] left   upper quadrant drainage catheter. New high attenuation material within the multi loculated left upper quadrant   fluid collection suggests leakage from the adjacent abnormal colonic segment   indicating micro perforation. The above findings were called by Dr. Holly Gurrola MD to Dr. Esha Paredes On 7/28/2021 at 12:32. CT ABDOMEN PELVIS W IV CONTRAST Additional Contrast? Radiologist Recommendation   Final Result   1. Large complex fluid collection in the left upper quadrant, concerning for   abscess. This is likely related to the previously described colitis. 2. Consolidation and ground-glass opacity in both lower lobes with left   pleural effusion, concerning for multifocal pneumonia. 3. Diffuse urinary bladder wall thickening. This could be due to   underdistention, but correlation for any signs or symptoms of cystitis is   recommended. 4. Chronic pancreatitis. 5. Nonobstructing left nephrolithiasis.          XR CHEST (SINGLE VIEW FRONTAL)   Final Result   Interval improvement in still mild interstitial edema with bilateral   asymmetric, left greater than right, pleural effusions         CT HEAD WO CONTRAST   Final Result   No acute intracranial abnormality. Suboccipital craniectomy. XR CHEST PORTABLE   Final Result   1. Increasing vascular congestion and left perihilar opacity. 2.  Pleural effusions and basilar opacities are otherwise without significant   change. XR ACUTE ABD SERIES CHEST 1 VW   Final Result   Bilateral airspace disease and pleural effusions. Findings in the upper   lobes appear increased compared to prior, left greater than right. Findings   may be related to asymmetric edema with superimposed pneumonia not excluded. Gas and stool are seen throughout nondilated bowel loops with few nonspecific   air-fluid levels in the right lower abdomen, likely in small bowel. Findings   may be physiologic or related to mild ileus. No evidence of obstruction or   free air. XR LUMBAR SPINE (2-3 VIEWS)   Final Result   Lumbar spine: Superior endplate fracture L5 which appears acute to subacute. No subluxation. Other vertebra well maintained. Spine significant compression deformity T6 with there are endplate loss in   height T8, T9 and T10. No subluxation. Mild levo scoliotic curvature. Osteopenia complicates assessment. Pedicles are intact. Low lung volumes   complicate assessment. There is suggestion of pleural effusions. XR THORACIC SPINE (3 VIEWS)   Final Result   Lumbar spine: Superior endplate fracture L5 which appears acute to subacute. No subluxation. Other vertebra well maintained. Spine significant compression deformity T6 with there are endplate loss in   height T8, T9 and T10. No subluxation. Mild levo scoliotic curvature. Osteopenia complicates assessment. Pedicles are intact. Low lung volumes   complicate assessment. There is suggestion of pleural effusions.          MRI THORACIC SPINE WO CONTRAST   Final Result   1. Limited examination. 2. Compression deformities involving T6, T8, T9 and T11. These are likely   chronic in nature. Diffusely decreased T1 and T2 marrow signal within the T6   vertebral body compatible with the sclerosis seen on the prior CT. 3. There is minimal bone marrow edema involving the left lateral elements of   T6 and T7. Unclear whether this is degenerative in nature or perhaps   sequelae of recent or remote trauma. 4. No significant spinal canal stenosis of the thoracic spine. 5. Moderate bilateral neural foraminal narrowing at T6-T7.   6. Bilateral pleural effusions, left greater than right. MRI LUMBAR SPINE WO CONTRAST   Final Result   1. Acute compression deformity of the superior endplate of L5 with   approximately 35% loss of height. No significant bulging of the posterior   cortex. 2. No significant spinal canal stenosis of the lumbar spine. 3. Neural foraminal narrowing at L3-L4 through L5-S1.   4. Minimal retrolisthesis at L5-S1. XR CHEST PORTABLE   Final Result   Interval development of bibasilar infiltrates and small pleural effusions   which could represent dependent edema, pneumonia or aspiration. XR ABDOMEN (KUB) (SINGLE AP VIEW)   Final Result   Mild dilation of the right hemicolon and patchy small bowel gas most likely   due to ileus. XR CHEST PORTABLE   Final Result   No radiologic evidence of acute cardiopulmonary disease. CT CHEST ABDOMEN PELVIS W CONTRAST   Final Result   Probable acute diverticulitis or colitis left colon. No evidence of   perforation or abscess at this time. Chronic pancreatitis. Multiple compression fractures some of which are new since the previous exam.      Lingular ground-glass infiltrate. Recommend follow-up to resolution. Intra-atrial lipoma right atrium. Cardiac echo may be helpful. CT LUMBAR SPINE TRAUMA RECONSTRUCTION   Final Result   CT thoracic spine:      1. Decrease in height in T6 compared to prior study compatible with   worsening compression with subacute etiology and mild protrusion of the   dorsal aspect of T6. Narrowed T6-T7 neural foramina. 2.  Chronic superior height T8, T9, and T11 without interval change from   prior study. 3.  Mild prevertebral soft tissue prominence T6 without evidence of abscess   formation. CT lumbar spine:      1. No traumatic malalignment. 2.  Compression fracture superior L5 with subacute appearance. 3.  Calcifications in the abdomen incompletely included appearance suggesting   pancreatic calcifications. RECOMMENDATIONS:   Please reference CT chest, abdomen and pelvis of same day. CT THORACIC SPINE TRAUMA RECONSTRUCTION   Final Result   CT thoracic spine:      1. Decrease in height in T6 compared to prior study compatible with   worsening compression with subacute etiology and mild protrusion of the   dorsal aspect of T6. Narrowed T6-T7 neural foramina. 2.  Chronic superior height T8, T9, and T11 without interval change from   prior study. 3.  Mild prevertebral soft tissue prominence T6 without evidence of abscess   formation. CT lumbar spine:      1. No traumatic malalignment. 2.  Compression fracture superior L5 with subacute appearance. 3.  Calcifications in the abdomen incompletely included appearance suggesting   pancreatic calcifications. RECOMMENDATIONS:   Please reference CT chest, abdomen and pelvis of same day. XR THORACIC SPINE (2 VIEWS)    (Results Pending)   XR LUMBAR SPINE (2-3 VIEWS)    (Results Pending)        Echocardiogram:   Results for orders placed during the hospital encounter of 07/21/21    ECHO Complete 2D W Doppler W Color    Summary  Left ventricle is normal in size.  Global left ventricular systolic function  is difficult to assess due to heart rate but appears normal. Calculated  ejection fraction 70% by Guadarrama's method. Visually estimated EF 60-65%. Left atrium is normal in size. Lipomatous atrial septum. No shunt seen by color Doppler. Normal right ventricular size and function. No significant valvular regurgitation or stenosis seen. ASSESSMENT AND PLAN     Assessment:    // Acute hypoxic respiratory failure  // Severe sepsis  // Acute metabolic encephalopathy, improved  // Intra-abdominal abscess, s/p IR guided drainage 7/28, suspected pancreatic origin  // Left pleural effusion/compressive atelectasis, s/p thoracentesis, pleural fluid exudative, with neutrophilic predominance and elevated amylase levels  // COPD, severity to be determined  // Leukocytosis  // Compression fracture of thoracic vertebrae  // Seizure disorder  // Essential hypertension  // Hyperlipidemia  // Chronic pancreatitis  // Alcohol abuse  // Tobacco abuse    Plan:    I personally interviewed/examined the patient; reviewed interval history, interpreted all available radiographic and laboratory data at the time of service. Patient is hemodynamically stable and is currently saturating well on nasal cannula  Continue nocturnal and as needed BiPAP  Continue supplemental oxygen to keep oxygen saturation >90%  Encourage incentive spirometry  Continue pulmonary toilet, aspiration precautions and bronchodilators  Monitor I/O, electrolytes with a goal of even/negative fluid balance  Monitor output from left upper quadrant drain  General surgery following  Stress ulcer prophylaxis  Chemical DVT prophylaxis as per protocol  Antimicrobials reviewed; continue Zosyn as per ID recommendation, follow-up culture results  Skin/wound care reviewed with the nursing staff  Physical/occupational therapy    I would like to thank you for allowing me to participate in the care of this patient. Please feel free to call with any further questions or concerns.       Paulina Trivedi MD  Pulmonary and Critical Care Medicine           7/30/2021     Please note that this chart was generated using voice recognition Dragon dictation software. Although every effort was made to ensure the accuracy of this automated transcription, some errors in transcription may have occurred.

## 2021-07-30 NOTE — PLAN OF CARE
Problem: Pain:  Goal: Pain level will decrease  Description: Pain level will decrease  Outcome: Ongoing  Goal: Control of acute pain  Description: Control of acute pain  Outcome: Ongoing  Goal: Control of chronic pain  Description: Control of chronic pain  Outcome: Ongoing     Problem: Skin Integrity:  Goal: Will show no infection signs and symptoms  Description: Will show no infection signs and symptoms  Outcome: Ongoing  Goal: Absence of new skin breakdown  Description: Absence of new skin breakdown  Outcome: Ongoing     Problem: Falls - Risk of:  Goal: Will remain free from falls  Description: Will remain free from falls  Outcome: Ongoing  Goal: Absence of physical injury  Description: Absence of physical injury  Outcome: Ongoing     Problem: Nutrition  Goal: Optimal nutrition therapy  7/30/2021 1908 by Alfonso Claudio RN  Outcome: Ongoing  7/30/2021 1302 by Pam Hsieh RD, LD  Outcome: Ongoing  Note: Nutrition Problem #1: Inadequate oral intake  Intervention: Food and/or Nutrient Delivery:  (Start diet as able; suggest ensure enlive supplements BID as able)  Nutritional Goals: Intake able to meet >75% of estimated nutrition needs

## 2021-07-30 NOTE — PROGRESS NOTES
Physical Therapy        Physical Therapy Cancel Note      DATE: 2021    NAME: Crystal Linares  MRN: [id number]   : 1970      Patient not seen this date for Physical Therapy due to:    Patient Declined: Pt declines treatment today. Per pt's mother. Pt might have surgery today and is in a lot of pain. Pt heavily medicated. Will check on pt tomorrow.       Electronically signed by Luis Eric PTA on 2021 at 2:03 PM

## 2021-07-30 NOTE — PROGRESS NOTES
[facility]  Office: [address], DO, James Segura, DO, Bennettsenait Johnson, DO, Sandy Evangelista Blood, DO, Becky Givens MD, Ana Maria Rodriguez MD, Gemma Briggs MD, Ishmael Collado MD, Daren Klinefelter, MD, Sachin Torres MD, Deondre Molina MD, Cher Collet, DO, Lisa Light MD, Ricke Bosworth, DO, Fabiola Burdick MD,  Asha Wood DO, Everardo Armenta MD, Deny Wild MD, Geronimo Garber MD, Meir Ribeiro MD, Alisha Cage MD, Paco Mcguire MD, Neeta Trammell, [facility], [facility], CNP, Ester Santos, CNP, Antony Waters, CNS, Domitila Hilton, CNP, Gerry Latham, CNP, Mindy Artis, CNP, Darling Marcos, CNP, Nacho Rodriguez, CNP, Jennifer Garber PA-C, Lora Chaidez, [facility], Malik Cee, CNP, Holly Vincent, CNP, Darling Montano, CNP, Merary Howard, CNP, Lane Santiago, CNP, Paulette Bullock, CNP, Jared Florez, CNP, Radha Calvin, [address] [place] [address]    Progress Note    7/30/2021    8:01 AM    Name:   Daphney Alcaraz  MRN:     [id number]     Acct:      [de-identified]   Room:   2022/2022-01   Day:  9  Admit Date:  7/21/2021  3:06 PM    PCP:   Celi Curran MD  Code Status:  Full Code    Subjective:     C/C:   Chief Complaint   Patient presents with    Back Pain     states chronic back pain becoming worse.  has recently started physical therapy     Interval History Status: worsened. Seen and evaluated in room sitting in bed in no acute distress  Vital signs stable, T-max overnight 100.2 on 3 L low flow nasal cannula  Labs, notes reviewed  Potassium low this morning, orders already in for supplementation  White count uptrending on Zosyn therapy, awaiting culture speciation-thus far no growth to date on anaerobic, body fluid, blood cultures or urine    Brief History:      This is a 50-year-old female with underlying history of hypertension, COPD, anxiety/depression, UTI, thoracic compression fractures, peripheral neuropathy, migraine headaches, seizure disorder, and alcoholism (last drink 6 months ago) who presents the emergency department with generalized malaise, headache and abdominal/back pain.    Initial blood work revealed leukocytosis, elevated alkaline phosphatase, she was tachycardic and hypoxic as well, imaging revealed picture of acute diverticulitis/colitis of left colon without peripheral abscess along with chronic pancreatitis, CT spine revealed stable T7-T9 and T11 compression fracture, worsening of compression fracture of T6 and new T5 and superior L5 subacute fracture. Admitted to the hospital for sepsis related to diverticulitis. Started on zosyn. Neurosurgery consulted regarding compression fractures, no surgical intervention inpatient. Pt condition continued to worsen. Chest x-ray suggestive of pneumonia. Pulmonology consulted. Hi flow nasal cannula initiated in conjunction with steroids. ID consulted for abx recommendations.      7/27: pt continued to deteriorate clinically. Repeat CT of the abdomen revealed LUQ abscess, left pleural effusion. IR consulted for thoracentesis and intraabdominal abscess drainage. General surgery consulted for intraabdominal abscess.      7/28: IR guided drain placed for intraabdominal abscess. Thoracentesis completed, 400 mL fluid removed. Exudative based on Light's criteria. Review of Systems:     Constitutional:  negative for chills, fevers, sweats  Respiratory:  negative for cough, dyspnea on exertion, shortness of breath, wheezing  Cardiovascular:  negative for chest pain, chest pressure/discomfort, lower extremity edema, palpitations  Gastrointestinal:  + abdominal pain, constipation, diarrhea, nausea, vomiting  Neurological:  negative for dizziness, headache    Medications:      Allergies:  No Known Allergies    Current Meds:   Scheduled Meds:    guaiFENesin  600 mg Oral BID    nicotine  1 patch Transdermal Daily    ipratropium-albuterol  1 ampule Inhalation TID    lidocaine  1 patch Transdermal Daily    [Held by provider] spironolactone  50 mg Oral Daily    polyethylene glycol  17 g Oral BID    carBAMazepine  200 mg Oral TID    topiramate  50 mg Oral BID    amLODIPine  5 mg Oral Daily    baclofen  10 mg Oral TID    budesonide-formoterol  2 puff Inhalation BID    busPIRone  15 mg Oral TID    ferrous sulfate  325 mg Oral BID    folic acid  1 mg Oral Daily    potassium chloride  20 mEq Oral Daily with breakfast    pregabalin  200 mg Oral TID    rOPINIRole  1 mg Oral Nightly    sodium chloride flush  5-40 mL Intravenous 2 times per day    enoxaparin  40 mg Subcutaneous Daily    piperacillin-tazobactam  3,375 mg Intravenous Q8H     Continuous Infusions:    lactated ringers 100 mL/hr at 07/29/21 0217    sodium chloride 25 mL (07/24/21 1803)     PRN Meds: magnesium sulfate, fentanNYL **OR** fentanNYL, potassium chloride **OR** potassium alternative oral replacement **OR** potassium chloride, LORazepam, hyoscyamine, hydrOXYzine, bisacodyl, albuterol, traZODone, sodium chloride flush, sodium chloride, acetaminophen **OR** acetaminophen, [Held by provider] oxyCODONE-acetaminophen **OR** [DISCONTINUED] oxyCODONE-acetaminophen, melatonin    Data:     Past Medical History:   has a past medical history of Acute respiratory failure with hypoxia ([address] Alcohol withdrawal syndrome, with delirium ([address] Alcoholism ([address] Anemia, 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     Social History:   reports that she has been smoking cigarettes. She has a 15.00 pack-year smoking history. She has never used smokeless tobacco. She reports previous alcohol use. She reports current drug use. Frequency: 2.00 times per week. Drug: Marijuana. Family History:   Family History   Problem Relation Age of Onset    Other Father     Cancer Maternal Grandmother     Heart Disease Paternal Grandmother     Esophageal Cancer Maternal Aunt        Vitals:  BP (!) 136/92   Pulse 97   Temp 99 °F (37.2 °C) (Oral)   Resp 28   Ht 5' 5\" (1.651 m)   Wt 142 lb 9.6 oz (64.7 kg)   SpO2 95%   BMI 23.73 kg/m²   Temp (24hrs), Av.3 °F (37.4 °C), Min:98.6 °F (37 °C), Max:100.2 °F (37.9 °C)    No results for input(s): POCGLU in the last 72 hours. I/O (24Hr): Intake/Output Summary (Last 24 hours) at 2021 0801  Last data filed at 2021 0600  Gross per 24 hour   Intake 1985 ml   Output 1982 ml   Net 3 ml       Labs:  Hematology:  Recent Labs     21  1230 21  0159 21  0453 21  0453 21  1706 21  0320 21  0608   WBC  --   --  17.0*  --   --  20.8* 25.2*   RBC  --   --  2.89*  --   --  3.28* 3.14*   HGB  --   --  8.5*   < > 9.8* 9.5* 9.4*   HCT  --   --  27.9*   < > 31.2* 30.5* 28.8*   MCV  --   --  96.5  --   --  93.0 91.7   MCH  --   --  29.4  --   --  29.0 29.9   MCHC  --   --  30.5  --   --  31.1 32.6   RDW  --   --  13.3  --   --  13.3 13.2   PLT  --   --  307  --   --  377 412   MPV  --   --  10.2  --   --  9.9 9.8   .7*  --   --   --   --   --   --    INR  --  1.1 1.1  --   --   --   --     < > = values in this interval not displayed.      Chemistry:  Recent Labs     21  0453 21  0741 21  0320 21  0437 21  1030 21  0608     --  138  --   --  137   K 3.7  --  3.7  --   --  3.3*     --  104  --   --  100   CO2 15*  --  23  --   --  21   GLUCOSE 200*  --  89  --   --  78   BUN 11  --  9  --   --  7   CREATININE 0.34*  --  0.53  --   -- 0.52   MG  --   --   --  1.4*  --   --    ANIONGAP 19*  --  11  --   --  16   LABGLOM >60  --  >60  --   --  >60   GFRAA >60  --  >60  --   --  >60   CALCIUM 7.1*  --  8.5*  --   --  8.4*   TROPHS  --   --   --  9  --   --    LACTACIDWB 7.7* 1.2  --   --  1.3  --      Recent Labs     07/28/21  0005   AMYLASE 105*   LIPASE 101*     ABG:  Lab Results   Component Value Date    POCPH 7.467 07/26/2021    POCPCO2 44.7 07/26/2021    POCPO2 71.9 07/26/2021    POCHCO3 32.3 07/26/2021    NBEA NOT REPORTED 07/26/2021    PBEA 8 07/26/2021    DHM9ZVT NOT REPORTED 07/26/2021    XTOL6NYX 95 07/26/2021    FIO2 INFORMATION NOT PROVIDED 07/28/2021     Lab Results   Component Value Date/Time    SPECIAL NOT REPORTED 07/28/2021 11:51 AM     Lab Results   Component Value Date/Time    CULTURE NO GROWTH 2 DAYS 07/28/2021 11:51 AM       Radiology:  XR CHEST (SINGLE VIEW FRONTAL)    Result Date: 7/29/2021  Pulmonary congestion with bibasilar effusions and adjacent infiltrates. XR CHEST (SINGLE VIEW FRONTAL)    Result Date: 7/27/2021  Interval improvement in still mild interstitial edema with bilateral asymmetric, left greater than right, pleural effusions     XR THORACIC SPINE (3 VIEWS)    Result Date: 7/23/2021  Lumbar spine: Superior endplate fracture L5 which appears acute to subacute. No subluxation. Other vertebra well maintained. Spine significant compression deformity T6 with there are endplate loss in height T8, T9 and T10. No subluxation. Mild levo scoliotic curvature. Osteopenia complicates assessment. Pedicles are intact. Low lung volumes complicate assessment. There is suggestion of pleural effusions. XR LUMBAR SPINE (2-3 VIEWS)    Result Date: 7/23/2021  Lumbar spine: Superior endplate fracture L5 which appears acute to subacute. No subluxation. Other vertebra well maintained. Spine significant compression deformity T6 with there are endplate loss in height T8, T9 and T10. No subluxation.   Mild levo scoliotic curvature. Osteopenia complicates assessment. Pedicles are intact. Low lung volumes complicate assessment. There is suggestion of pleural effusions. XR ACUTE ABD SERIES CHEST 1 VW    Result Date: 7/24/2021  Bilateral airspace disease and pleural effusions. Findings in the upper lobes appear increased compared to prior, left greater than right. Findings may be related to asymmetric edema with superimposed pneumonia not excluded. Gas and stool are seen throughout nondilated bowel loops with few nonspecific air-fluid levels in the right lower abdomen, likely in small bowel. Findings may be physiologic or related to mild ileus. No evidence of obstruction or free air. CT HEAD WO CONTRAST    Result Date: 7/25/2021  No acute intracranial abnormality. Suboccipital craniectomy. MRI THORACIC SPINE WO CONTRAST    Result Date: 7/23/2021  1. Limited examination. 2. Compression deformities involving T6, T8, T9 and T11. These are likely chronic in nature. Diffusely decreased T1 and T2 marrow signal within the T6 vertebral body compatible with the sclerosis seen on the prior CT. 3. There is minimal bone marrow edema involving the left lateral elements of T6 and T7. Unclear whether this is degenerative in nature or perhaps sequelae of recent or remote trauma. 4. No significant spinal canal stenosis of the thoracic spine. 5. Moderate bilateral neural foraminal narrowing at T6-T7. 6. Bilateral pleural effusions, left greater than right. MRI LUMBAR SPINE WO CONTRAST    Result Date: 7/23/2021  1. Acute compression deformity of the superior endplate of L5 with approximately 35% loss of height. No significant bulging of the posterior cortex. 2. No significant spinal canal stenosis of the lumbar spine. 3. Neural foraminal narrowing at L3-L4 through L5-S1. 4. Minimal retrolisthesis at L5-S1. CT ABDOMEN PELVIS W IV CONTRAST Additional Contrast? Radiologist Recommendation    Result Date: 7/27/2021  1.  Large complex fluid collection in the left upper quadrant, concerning for abscess. This is likely related to the previously described colitis. 2. Consolidation and ground-glass opacity in both lower lobes with left pleural effusion, concerning for multifocal pneumonia. 3. Diffuse urinary bladder wall thickening. This could be due to underdistention, but correlation for any signs or symptoms of cystitis is recommended. 4. Chronic pancreatitis. 5. Nonobstructing left nephrolithiasis. XR CHEST PORTABLE    Result Date: 7/25/2021  1. Increasing vascular congestion and left perihilar opacity. 2.  Pleural effusions and basilar opacities are otherwise without significant change. CT ABSCESS DRAINAGE    Result Date: 7/28/2021  Successful percutaneous ultrasound and CT guided placement of 8 Jordanian left upper quadrant drainage catheter. New high attenuation material within the multi loculated left upper quadrant fluid collection suggests leakage from the adjacent abnormal colonic segment indicating micro perforation. The above findings were called by Dr. Judy Armenta MD to Dr. Alicia Contreras On 7/28/2021 at 12:32. IR GUIDED THORACENTESIS PLEURAL    Result Date: 7/28/2021  Successful ultrasound guided thoracentesis.        Physical Examination:        General appearance:  alert, cooperative and in mild distress, anxious appears  Mental Status:  oriented to person, place and time   Lungs:  clear to auscultation bilaterally, normal effort  Heart:  regular rate and rhythm, no murmur  Abdomen:  soft, tender, nondistended, normal bowel sounds, no masses, hepatomegaly, splenomegaly, FRANSISCO drain with bloody gelatinous content  Extremities:  no edema, redness, tenderness in the calves  Skin:  no gross lesions, rashes, induration    Assessment:        Hospital Problems         Last Modified POA    * (Principal) Severe sepsis ([address] 7/28/2021 Yes    Essential hypertension 7/21/2021 Yes    Tobacco use 7/21/2021 Yes    Seizure completed showing no abnormal activity.    - On Tegretol, Ativan for breakthrough seizures. - Continue seizure precautions.    - Neurology initially following, signed off.    - Follow-up with Dr. Glenna Quiros in 4 to 6 weeks    Back pain with peripheral neuropathy:   - With history of chronic L4-5 radiculopathy and osteoporosis. - No fracture at T5 and L5 with chronic compression fracture with worsening at T6.    - Multimodal pain management including Lyrica, baclofen, lidocaine.   -  No neurosurgical intervention at this time    Migraines: Continue Topamax    Depression with anxiety:   - Guarded, patient continues on BuSpar, Atarax    EtOH abuse with chronic pancreatitis with hypomagnesemia:   - Replace magnesium as ordered, recheck at 1600 today. - Continue supplementation  -Monitor for s/s of withdrawal    Livedo reticularis    GI/DVT prophylaxis: Protonix, Lovenox    Dispo: Home once full work-up is complete. Patient needs outpatient DEXA scan and follow-up outpatient for consideration of kyphoplasty with neurosurgery.  Follow-up with Dr. Glenna Quiros in 4 to 6 weeks    LOI San NP  7/30/2021  8:01 AM

## 2021-07-30 NOTE — PLAN OF CARE
Problem: RESPIRATORY  Intervention: Respiratory assessment  Note: BRONCHOSPASM/BRONCHOCONSTRICTION     [x]         IMPROVE AERATION/BREATH SOUNDS  [x]   ADMINISTER BRONCHODILATOR THERAPY AS APPROPRIATE  [x]   ASSESS BREATH SOUNDS  []   IMPLEMENT AEROSOL/MDI PROTOCOL  [x]   PATIENT EDUCATION AS NEEDED        Problem: RESPIRATORY  Intervention: Provide oxygen therapy  Note:   PROVIDE ADEQUATE OXYGENATION WITH ACCEPTABLE SP02/ABG'S    [x]  IDENTIFY APPROPRIATE OXYGEN THERAPY  [x]   MONITOR SP02/ABG'S AS NEEDED   []   PATIENT EDUCATION AS NEEDED

## 2021-07-30 NOTE — PROGRESS NOTES
PROGRESS NOTE          PATIENT NAME: Desmond Wong RECORD NO. [id number]  DATE: 7/30/2021  SURGEON: Kirill Streeter  PRIMARY CARE PHYSICIAN: Gricelda Trevizo MD    HD: # 9    ASSESSMENT    Patient Active Problem List   Diagnosis    Acute bronchitis    Reflex sympathetic dystrophy of lower limb    Essential hypertension    Nicotine addiction    Psychophysiological insomnia    Anxiety    Alcoholic peripheral neuropathy (HCC)    Hypokalemia    Macrocytic anemia    Hypomagnesemia    Tobacco use    Ambulatory dysfunction    Seizure disorder (HCC)    COPD with acute exacerbation (HCC)    Paresthesia of lower extremity    Acute respiratory failure with hypoxia (HCC)    Pancreatic cyst    Recurrent major depressive disorder (HCC)    Elevated CA 19-9 level    Perineal mass, female    Esophagitis candidal     Condyloma    Astrocytoma (HCC) - 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24, the patient underwent 2 surgical resections without known recurrence    Alcohol use disorder, severe, in early remission ([address]    Metabolic encephalopathy    AMAN (generalized anxiety disorder)    Major depressive disorder, recurrent severe without psychotic features ([address]    Esophageal dysphagia    Chronic peripheral neuropathic pain    History of alcohol abuse    History of petit-mal seizures    Intractable episodic tension-type headache    Personal history of astrocytoma    Multilevel spine pain    Chronic pain syndrome    Marijuana abuse    Severe sepsis (HCC)    Closed fracture of fifth thoracic vertebra (HCC)    Diverticulitis of large intestine with abscess without bleeding    Elevated brain natriuretic peptide (BNP) level    Elevated alkaline phosphatase level    Chronic pancreatitis (HCC)    Erythema ab igne    Compression fracture of thoracic vertebra (HCC)    Compression of lumbar vertebra (HCC)    Metabolic acidosis with increased anion gap and accumulation of organic acids    Parapneumonic effusion    Septicemia [facility])       MEDICAL DECISION MAKING AND PLAN    Clinical picture remains similar as yesterday.  IR placement of left upper quadrant drain and thoracentesis yesterday. Hemodynamically stable: HR no 90s, SBP 130s-140s  Normotensive. Afebrile, T-max 37.9  Leukocytosis worsenin.2 today (20.8, 17.0)  Lactic acid: 0.6 today (1.3, 1.2, 7.7)  Abdominal pain still present, similar to prior. Non-peritoneal  CT abdomen abscess drainage yesterday showed possible microperforation with contrast leak from: 2 area of loculated fluid collection in the left LUQ. Ordered CT A/P with IV, PO, and rectal contrast  Drain output is very dark sanguinous and not feculent or purulent, with culture negative for bacteria, positive for amylase and lipase  ABx: Zosyn day 9  Continue n.p.o. We will continue to monitor. Possible need for OR if patient clinical picture does not improve or deteriorates again. Continue remaining medical management per primary      Chief Complaint: \" My back hurts\"    Venkat Johnston was seen and examined at bedside, no overnight events. Patient reports continued abdominal pain with radiation to back, sharp in nature which comes and goes. Urinating independently. On 3L NC. VSS, afebrile, T-max 37.5. OBJECTIVE  VITALS: Temp: Temp: 98.6 °F (37 °C)Temp  Av.2 °F (37.3 °C)  Min: 98.6 °F (37 °C)  Max: 100.2 °F (26.8 °C) BP Systolic (97QHK), YTU:098 , Min:136 , TGS:571   Diastolic (60ZUK), CJV:77, Min:89, Max:99   Pulse Pulse  Av.1  Min: 90  Max: 102 Resp Resp  Av.9  Min: 15  Max: 30 Pulse ox SpO2  Av.5 %  Min: 94 %  Max: 97 %  GENERAL: alert, cooperative, mild distress due to pain  NEURO: A&Ox4  HEENT: NCAT, PERRLA  : normal  LUNGS: Unlabored breathing on 3L NC  HEART: normal rate and regular rhythm  ABDOMEN: Soft, nondistended, diffuse TTP worse at epigastric region. Nonperitoneal without rigidity/rebound.   Some voluntary guarding  EXTREMITY: no cyanosis, clubbing or edema    I/O last 3 completed shifts: In: 1985 [P.O.:361; I.V.:1624]  Out: 1982 [AVGKV:3724; Drains:32]    Drain/tube output: In: 1985 [P.O.:361; I.V.:1624]  Out: 1832 [Urine:1800; Drains:32]    LAB:  CBC:   Recent Labs     07/28/21  0453 07/28/21  0453 07/28/21  1706 07/29/21  0320 07/30/21  0608   WBC 17.0*  --   --  20.8* 25.2*   HGB 8.5*   < > 9.8* 9.5* 9.4*   HCT 27.9*   < > 31.2* 30.5* 28.8*   MCV 96.5  --   --  93.0 91.7     --   --  377 412    < > = values in this interval not displayed. BMP:   Recent Labs     07/28/21  0453 07/29/21  0320 07/30/21  0608    138 137   K 3.7 3.7 3.3*    104 100   CO2 15* 23 21   BUN 11 9 7   CREATININE 0.34* 0.53 0.52   GLUCOSE 200* 89 78     COAGS:   Recent Labs     07/28/21  0159 07/28/21 0453   APTT  --  27.6   INR 1.1 1.1       RADIOLOGY:  XR CHEST (SINGLE VIEW FRONTAL)    Result Date: 7/29/2021  Pulmonary congestion with bibasilar effusions and adjacent infiltrates. CT ABDOMEN PELVIS W IV CONTRAST Additional Contrast? Oral and Rectal    Result Date: 7/30/2021  Increasing multiloculated rim enhancing fluid collections in the left upper quadrant. Slightly decreased size of the dominant pocket of fluid which now contains a percutaneous drainage catheter, though there are innumerable new smaller collections and other previously present smaller collections which are increased in size. I suspect many of these do not communicate with the pocket of fluid containing the drainage catheter. Excellent opacification of the colon with enteric contrast without definitive enteric contrast in the left upper quadrant collection to confirm a colonic source. Pancreatic source is a possible alternative consideration in the appropriate clinical setting. Persistent colonic wall thickening in the region of the hepatic flexure on a background of probable ileus. Increased urinary bladder wall thickening with slightly increased perinephric stranding.   Correlate with urinary labs for contributory urinary tract infection. Yoandy Jha,   7/30/21, 2:16 PM         Trauma Attending Attestation      I have reviewed the above [facility] note(s) and confirmed the key elements of the medical history and physical exam. I have seen and examined the pt. I have discussed the findings, established the care plan and recommendations with Resident, [facility] RN, bedside nurse.   Leukocytosis   MRCP         Brielle Marsh DO  8/1/2021  8:09 PM

## 2021-07-30 NOTE — PROGRESS NOTES
Comprehensive Nutrition Assessment    Type and Reason for Visit:  Reassess    Nutrition Recommendations/Plan:   -Continue NPO status   -Start diet as able   -Suggest ensure enlive supplements BID as able   -Will monitor nutrition progression     Nutrition Assessment:  Pt remains NPO x 3 days. Pt noted w/ wt gain over 9 mo per EMR. Will monitor for start of diet and add nutritional supplements as able to compliment po intake. Malnutrition Assessment:  Malnutrition Status:  Insufficient data    Context:  Acute Illness     Findings of the 6 clinical characteristics of malnutrition:  Energy Intake:  1 - 75% or less of estimated energy requirements for 7 or more days  Weight Loss:  No significant weight loss     Body Fat Loss:  Unable to assess     Muscle Mass Loss:  Unable to assess    Fluid Accumulation:  1 - Mild Extremities   Strength:  Not Performed    Estimated Daily Nutrient Needs:  Energy (kcal):  1750-950 kcal/d (27-30 kcal/kg); Weight Used for Energy Requirements:  Current     Protein (g):  80-90 g protein/d (1.2-1.4 g/kg); Weight Used for Protein Requirements:  Current        Fluid (ml/day):  2071-4084 mL fluid/d or per MD; Method Used for Fluid Requirements:  ml/Kg (25-30 mL)      Nutrition Related Findings:  BM 7/29; K 3.3; meds reviewed      Wounds:  None       Current Nutrition Therapies:    Diet NPO    Anthropometric Measures:  · Height: 5' 5\" (165.1 cm)  · Current Body Weight: 142 lb (64.4 kg)   · Admission Body Weight: 140 lb (63.5 kg)    · Usual Body Weight: 133 lb 9.6 oz (60.6 kg) (3/31/2021)     · Ideal Body Weight: 125 lbs; % Ideal Body Weight 113.6 %   · BMI: 23.6  · BMI Categories: Normal Weight (BMI 18.5-24. 9)       Nutrition Diagnosis:   · Inadequate oral intake related to altered GI function as evidenced by NPO or clear liquid status due to medical condition (x 3 days)      Nutrition Interventions:   Food and/or Nutrient Delivery:   (Start diet as able; suggest ensure enlive

## 2021-07-30 NOTE — PROGRESS NOTES
[facility]up[facility] [address]  Occupational Therapy Not Seen Note    DATE: 2021    NAME: Chris Rogers  MRN: [id number]   : 1970      Patient not seen this date for Occupational Therapy due to: Other: High pain, heavily medicated. Possible surgery.         Electronically signed by NATALIE Brush on 2021 at 2:25 PM

## 2021-07-30 NOTE — BRIEF OP NOTE
Brief Postoperative Note for Thoracentesis    Ana Delaney  YOB: 1970  [id number]    Pre-operative Diagnosis: left Pleural effusion      Post-operative Diagnosis: Same    Procedure: Ultrasound guided Thoracentesis     Anesthesia: 1% Lidocaine     Surgeons/Assistants: Ariane Saha PA-C    Complications: none    EBL: Minimal    Specimens: were obtained    Ultrasound guided left thoracentesis performed. 450 ml turbid yellow fluid with debris obtained. Dressing applied.       Electronically signed by LILLIANA Tom on 7/30/2021 at 5:04 PM

## 2021-07-31 ENCOUNTER — APPOINTMENT (OUTPATIENT)
Dept: MRI IMAGING | Age: 51
DRG: 720 | End: 2021-07-31
Payer: MEDICARE

## 2021-07-31 PROBLEM — K85.20 ALCOHOL-INDUCED ACUTE PANCREATITIS: Status: ACTIVE | Noted: 2021-07-31

## 2021-07-31 LAB
ABSOLUTE EOS #: 0 K/UL (ref 0–0.44)
ABSOLUTE IMMATURE GRANULOCYTE: 0.5 K/UL (ref 0–0.3)
ABSOLUTE LYMPH #: 2.49 K/UL (ref 1.1–3.7)
ABSOLUTE MONO #: 1.99 K/UL (ref 0.1–1.2)
ALBUMIN SERPL-MCNC: 2.6 G/DL (ref 3.5–5.2)
ALBUMIN/GLOBULIN RATIO: 0.7 (ref 1–2.5)
ALP BLD-CCNC: 440 U/L (ref 35–104)
ALT SERPL-CCNC: 21 U/L (ref 5–33)
AMYLASE: 35 U/L (ref 28–100)
ANION GAP SERPL CALCULATED.3IONS-SCNC: 15 MMOL/L (ref 9–17)
AST SERPL-CCNC: 33 U/L
BASOPHILS # BLD: 0 % (ref 0–2)
BASOPHILS ABSOLUTE: 0 K/UL (ref 0–0.2)
BILIRUB SERPL-MCNC: 0.47 MG/DL (ref 0.3–1.2)
BILIRUBIN DIRECT: 0.31 MG/DL
BILIRUBIN, INDIRECT: 0.16 MG/DL (ref 0–1)
BUN BLDV-MCNC: 5 MG/DL (ref 6–20)
BUN/CREAT BLD: ABNORMAL (ref 9–20)
CALCIUM SERPL-MCNC: 8.9 MG/DL (ref 8.6–10.4)
CHLORIDE BLD-SCNC: 100 MMOL/L (ref 98–107)
CO2: 24 MMOL/L (ref 20–31)
CREAT SERPL-MCNC: 0.5 MG/DL (ref 0.5–0.9)
DIFFERENTIAL TYPE: ABNORMAL
EOSINOPHILS RELATIVE PERCENT: 0 % (ref 1–4)
GFR AFRICAN AMERICAN: >60 ML/MIN
GFR NON-AFRICAN AMERICAN: >60 ML/MIN
GFR SERPL CREATININE-BSD FRML MDRD: ABNORMAL ML/MIN/{1.73_M2}
GFR SERPL CREATININE-BSD FRML MDRD: ABNORMAL ML/MIN/{1.73_M2}
GLOBULIN: ABNORMAL G/DL (ref 1.5–3.8)
GLUCOSE BLD-MCNC: 87 MG/DL (ref 70–99)
HCT VFR BLD CALC: 31 % (ref 36.3–47.1)
HEMOGLOBIN: 9.8 G/DL (ref 11.9–15.1)
IMMATURE GRANULOCYTES: 2 %
LIPASE: 80 U/L (ref 13–60)
LYMPHOCYTES # BLD: 10 % (ref 24–43)
MCH RBC QN AUTO: 29.2 PG (ref 25.2–33.5)
MCHC RBC AUTO-ENTMCNC: 31.6 G/DL (ref 28.4–34.8)
MCV RBC AUTO: 92.3 FL (ref 82.6–102.9)
MONOCYTES # BLD: 8 % (ref 3–12)
MORPHOLOGY: NORMAL
NRBC AUTOMATED: 0 PER 100 WBC
PDW BLD-RTO: 13.1 % (ref 11.8–14.4)
PLATELET # BLD: 467 K/UL (ref 138–453)
PLATELET ESTIMATE: ABNORMAL
PMV BLD AUTO: 9.9 FL (ref 8.1–13.5)
POTASSIUM SERPL-SCNC: 4.2 MMOL/L (ref 3.7–5.3)
RBC # BLD: 3.36 M/UL (ref 3.95–5.11)
RBC # BLD: ABNORMAL 10*6/UL
SEG NEUTROPHILS: 80 % (ref 36–65)
SEGMENTED NEUTROPHILS ABSOLUTE COUNT: 19.92 K/UL (ref 1.5–8.1)
SODIUM BLD-SCNC: 139 MMOL/L (ref 135–144)
TOTAL PROTEIN: 6.1 G/DL (ref 6.4–8.3)
WBC # BLD: 24.9 K/UL (ref 3.5–11.3)
WBC # BLD: ABNORMAL 10*3/UL

## 2021-07-31 PROCEDURE — 82150 ASSAY OF AMYLASE: CPT

## 2021-07-31 PROCEDURE — 6370000000 HC RX 637 (ALT 250 FOR IP): Performed by: NURSE PRACTITIONER

## 2021-07-31 PROCEDURE — 99233 SBSQ HOSP IP/OBS HIGH 50: CPT | Performed by: INTERNAL MEDICINE

## 2021-07-31 PROCEDURE — 99232 SBSQ HOSP IP/OBS MODERATE 35: CPT | Performed by: INTERNAL MEDICINE

## 2021-07-31 PROCEDURE — 6370000000 HC RX 637 (ALT 250 FOR IP): Performed by: FAMILY MEDICINE

## 2021-07-31 PROCEDURE — 85025 COMPLETE CBC W/AUTO DIFF WBC: CPT

## 2021-07-31 PROCEDURE — 6360000004 HC RX CONTRAST MEDICATION: Performed by: STUDENT IN AN ORGANIZED HEALTH CARE EDUCATION/TRAINING PROGRAM

## 2021-07-31 PROCEDURE — 6370000000 HC RX 637 (ALT 250 FOR IP): Performed by: PHYSICIAN ASSISTANT

## 2021-07-31 PROCEDURE — 99233 SBSQ HOSP IP/OBS HIGH 50: CPT | Performed by: STUDENT IN AN ORGANIZED HEALTH CARE EDUCATION/TRAINING PROGRAM

## 2021-07-31 PROCEDURE — 6370000000 HC RX 637 (ALT 250 FOR IP): Performed by: INTERNAL MEDICINE

## 2021-07-31 PROCEDURE — 2580000003 HC RX 258: Performed by: NURSE PRACTITIONER

## 2021-07-31 PROCEDURE — 6360000002 HC RX W HCPCS: Performed by: NURSE PRACTITIONER

## 2021-07-31 PROCEDURE — 2700000000 HC OXYGEN THERAPY PER DAY

## 2021-07-31 PROCEDURE — 80076 HEPATIC FUNCTION PANEL: CPT

## 2021-07-31 PROCEDURE — 2060000000 HC ICU INTERMEDIATE R&B

## 2021-07-31 PROCEDURE — 2580000003 HC RX 258: Performed by: STUDENT IN AN ORGANIZED HEALTH CARE EDUCATION/TRAINING PROGRAM

## 2021-07-31 PROCEDURE — 94640 AIRWAY INHALATION TREATMENT: CPT

## 2021-07-31 PROCEDURE — 94761 N-INVAS EAR/PLS OXIMETRY MLT: CPT

## 2021-07-31 PROCEDURE — 6360000002 HC RX W HCPCS: Performed by: STUDENT IN AN ORGANIZED HEALTH CARE EDUCATION/TRAINING PROGRAM

## 2021-07-31 PROCEDURE — 6370000000 HC RX 637 (ALT 250 FOR IP): Performed by: STUDENT IN AN ORGANIZED HEALTH CARE EDUCATION/TRAINING PROGRAM

## 2021-07-31 PROCEDURE — 36415 COLL VENOUS BLD VENIPUNCTURE: CPT

## 2021-07-31 PROCEDURE — 83690 ASSAY OF LIPASE: CPT

## 2021-07-31 PROCEDURE — 74183 MRI ABD W/O CNTR FLWD CNTR: CPT

## 2021-07-31 PROCEDURE — 80048 BASIC METABOLIC PNL TOTAL CA: CPT

## 2021-07-31 PROCEDURE — A9579 GAD-BASE MR CONTRAST NOS,1ML: HCPCS | Performed by: STUDENT IN AN ORGANIZED HEALTH CARE EDUCATION/TRAINING PROGRAM

## 2021-07-31 RX ORDER — SODIUM CHLORIDE 0.9 % (FLUSH) 0.9 %
5-40 SYRINGE (ML) INJECTION 2 TIMES DAILY
Status: DISCONTINUED | OUTPATIENT
Start: 2021-07-31 | End: 2021-08-03 | Stop reason: HOSPADM

## 2021-07-31 RX ADMIN — SODIUM CHLORIDE, POTASSIUM CHLORIDE, SODIUM LACTATE AND CALCIUM CHLORIDE: 600; 310; 30; 20 INJECTION, SOLUTION INTRAVENOUS at 15:05

## 2021-07-31 RX ADMIN — SPIRONOLACTONE 50 MG: 25 TABLET ORAL at 08:58

## 2021-07-31 RX ADMIN — FENTANYL CITRATE 50 MCG: 50 INJECTION, SOLUTION INTRAMUSCULAR; INTRAVENOUS at 21:43

## 2021-07-31 RX ADMIN — POTASSIUM CHLORIDE 20 MEQ: 1500 TABLET, EXTENDED RELEASE ORAL at 08:58

## 2021-07-31 RX ADMIN — SODIUM CHLORIDE, PRESERVATIVE FREE 10 ML: 5 INJECTION INTRAVENOUS at 21:28

## 2021-07-31 RX ADMIN — FENTANYL CITRATE 50 MCG: 50 INJECTION, SOLUTION INTRAMUSCULAR; INTRAVENOUS at 09:15

## 2021-07-31 RX ADMIN — FERROUS SULFATE TAB EC 325 MG (65 MG FE EQUIVALENT) 325 MG: 325 (65 FE) TABLET DELAYED RESPONSE at 08:59

## 2021-07-31 RX ADMIN — ENOXAPARIN SODIUM 40 MG: 40 INJECTION SUBCUTANEOUS at 08:59

## 2021-07-31 RX ADMIN — ROPINIROLE HYDROCHLORIDE 1 MG: 1 TABLET, FILM COATED ORAL at 21:28

## 2021-07-31 RX ADMIN — BUSPIRONE HYDROCHLORIDE 15 MG: 15 TABLET ORAL at 08:59

## 2021-07-31 RX ADMIN — CARBAMAZEPINE 200 MG: 200 TABLET ORAL at 13:39

## 2021-07-31 RX ADMIN — SODIUM CHLORIDE, PRESERVATIVE FREE 10 ML: 5 INJECTION INTRAVENOUS at 09:00

## 2021-07-31 RX ADMIN — AMLODIPINE BESYLATE 5 MG: 5 TABLET ORAL at 08:59

## 2021-07-31 RX ADMIN — FOLIC ACID 1 MG: 1 TABLET ORAL at 08:59

## 2021-07-31 RX ADMIN — PANTOPRAZOLE SODIUM 40 MG: 40 TABLET, DELAYED RELEASE ORAL at 08:58

## 2021-07-31 RX ADMIN — ACETAMINOPHEN 650 MG: 325 TABLET ORAL at 23:47

## 2021-07-31 RX ADMIN — PIPERACILLIN AND TAZOBACTAM 3375 MG: 3; .375 INJECTION, POWDER, FOR SOLUTION INTRAVENOUS at 03:00

## 2021-07-31 RX ADMIN — GUAIFENESIN 600 MG: 600 TABLET, EXTENDED RELEASE ORAL at 21:28

## 2021-07-31 RX ADMIN — IPRATROPIUM BROMIDE AND ALBUTEROL SULFATE 1 AMPULE: .5; 3 SOLUTION RESPIRATORY (INHALATION) at 14:54

## 2021-07-31 RX ADMIN — BUSPIRONE HYDROCHLORIDE 15 MG: 15 TABLET ORAL at 13:38

## 2021-07-31 RX ADMIN — FENTANYL CITRATE 50 MCG: 50 INJECTION, SOLUTION INTRAMUSCULAR; INTRAVENOUS at 18:02

## 2021-07-31 RX ADMIN — TOPIRAMATE 50 MG: 25 TABLET, FILM COATED ORAL at 08:58

## 2021-07-31 RX ADMIN — TOPIRAMATE 50 MG: 25 TABLET, FILM COATED ORAL at 21:27

## 2021-07-31 RX ADMIN — CARBAMAZEPINE 200 MG: 200 TABLET ORAL at 21:28

## 2021-07-31 RX ADMIN — IPRATROPIUM BROMIDE AND ALBUTEROL SULFATE 1 AMPULE: .5; 3 SOLUTION RESPIRATORY (INHALATION) at 07:56

## 2021-07-31 RX ADMIN — PREGABALIN 200 MG: 100 CAPSULE ORAL at 08:58

## 2021-07-31 RX ADMIN — PREGABALIN 200 MG: 100 CAPSULE ORAL at 13:44

## 2021-07-31 RX ADMIN — FENTANYL CITRATE 50 MCG: 50 INJECTION, SOLUTION INTRAMUSCULAR; INTRAVENOUS at 03:25

## 2021-07-31 RX ADMIN — CARBAMAZEPINE 200 MG: 200 TABLET ORAL at 08:59

## 2021-07-31 RX ADMIN — GUAIFENESIN 600 MG: 600 TABLET, EXTENDED RELEASE ORAL at 08:58

## 2021-07-31 RX ADMIN — BACLOFEN 10 MG: 10 TABLET ORAL at 08:59

## 2021-07-31 RX ADMIN — FENTANYL CITRATE 50 MCG: 50 INJECTION, SOLUTION INTRAMUSCULAR; INTRAVENOUS at 01:46

## 2021-07-31 RX ADMIN — TRAZODONE HYDROCHLORIDE 50 MG: 50 TABLET ORAL at 23:47

## 2021-07-31 RX ADMIN — FERROUS SULFATE TAB EC 325 MG (65 MG FE EQUIVALENT) 325 MG: 325 (65 FE) TABLET DELAYED RESPONSE at 21:28

## 2021-07-31 RX ADMIN — LORAZEPAM 1 MG: 2 INJECTION INTRAMUSCULAR; INTRAVENOUS at 09:15

## 2021-07-31 RX ADMIN — BUSPIRONE HYDROCHLORIDE 15 MG: 15 TABLET ORAL at 21:28

## 2021-07-31 RX ADMIN — PREGABALIN 200 MG: 100 CAPSULE ORAL at 21:28

## 2021-07-31 RX ADMIN — GADOTERIDOL 12 ML: 279.3 INJECTION, SOLUTION INTRAVENOUS at 13:07

## 2021-07-31 RX ADMIN — FENTANYL CITRATE 50 MCG: 50 INJECTION, SOLUTION INTRAMUSCULAR; INTRAVENOUS at 13:43

## 2021-07-31 RX ADMIN — SODIUM CHLORIDE, POTASSIUM CHLORIDE, SODIUM LACTATE AND CALCIUM CHLORIDE: 600; 310; 30; 20 INJECTION, SOLUTION INTRAVENOUS at 23:50

## 2021-07-31 RX ADMIN — FENTANYL CITRATE 50 MCG: 50 INJECTION, SOLUTION INTRAMUSCULAR; INTRAVENOUS at 06:04

## 2021-07-31 RX ADMIN — BACLOFEN 10 MG: 10 TABLET ORAL at 13:38

## 2021-07-31 RX ADMIN — PIPERACILLIN AND TAZOBACTAM 3375 MG: 3; .375 INJECTION, POWDER, FOR SOLUTION INTRAVENOUS at 09:16

## 2021-07-31 RX ADMIN — BUDESONIDE AND FORMOTEROL FUMARATE DIHYDRATE 2 PUFF: 160; 4.5 AEROSOL RESPIRATORY (INHALATION) at 07:56

## 2021-07-31 RX ADMIN — BACLOFEN 10 MG: 10 TABLET ORAL at 21:28

## 2021-07-31 ASSESSMENT — PAIN - FUNCTIONAL ASSESSMENT: PAIN_FUNCTIONAL_ASSESSMENT: ACTIVITIES ARE NOT PREVENTED

## 2021-07-31 ASSESSMENT — PAIN SCALES - GENERAL
PAINLEVEL_OUTOF10: 2
PAINLEVEL_OUTOF10: 8
PAINLEVEL_OUTOF10: 7
PAINLEVEL_OUTOF10: 8
PAINLEVEL_OUTOF10: 9
PAINLEVEL_OUTOF10: 8
PAINLEVEL_OUTOF10: 2
PAINLEVEL_OUTOF10: 3
PAINLEVEL_OUTOF10: 8
PAINLEVEL_OUTOF10: 3
PAINLEVEL_OUTOF10: 4
PAINLEVEL_OUTOF10: 9
PAINLEVEL_OUTOF10: 8

## 2021-07-31 ASSESSMENT — PAIN DESCRIPTION - PAIN TYPE: TYPE: ACUTE PAIN;CHRONIC PAIN

## 2021-07-31 ASSESSMENT — ENCOUNTER SYMPTOMS
ABDOMINAL PAIN: 1
COLOR CHANGE: 0
SHORTNESS OF BREATH: 0
COUGH: 1
SORE THROAT: 0
EYE PAIN: 0
ABDOMINAL DISTENTION: 0
TROUBLE SWALLOWING: 0
NAUSEA: 0
DIARRHEA: 0
RHINORRHEA: 0
EYE DISCHARGE: 0
VOMITING: 0
CONSTIPATION: 0
SHORTNESS OF BREATH: 1
APNEA: 0
WHEEZING: 0

## 2021-07-31 ASSESSMENT — PAIN DESCRIPTION - DESCRIPTORS: DESCRIPTORS: ACHING;CONSTANT

## 2021-07-31 ASSESSMENT — PAIN DESCRIPTION - PROGRESSION: CLINICAL_PROGRESSION: GRADUALLY IMPROVING

## 2021-07-31 ASSESSMENT — PAIN DESCRIPTION - FREQUENCY: FREQUENCY: CONTINUOUS

## 2021-07-31 ASSESSMENT — PAIN DESCRIPTION - ONSET: ONSET: ON-GOING

## 2021-07-31 ASSESSMENT — PAIN DESCRIPTION - ORIENTATION: ORIENTATION: LEFT;LOWER

## 2021-07-31 ASSESSMENT — PAIN DESCRIPTION - LOCATION: LOCATION: BACK

## 2021-07-31 NOTE — PLAN OF CARE
Problem: Pain:  Goal: Pain level will decrease  Description: Pain level will decrease  7/31/2021 1809 by Coretta Brady RN  Outcome: Ongoing  7/31/2021 0515 by Joseph Harrison RN  Outcome: Ongoing  Goal: Control of acute pain  Description: Control of acute pain  7/31/2021 1809 by Coretta Brady RN  Outcome: Ongoing  7/31/2021 0515 by Joseph Harrison RN  Outcome: Ongoing  Goal: Control of chronic pain  Description: Control of chronic pain  7/31/2021 1809 by Coretta Brady RN  Outcome: Ongoing  7/31/2021 0515 by Joseph Harrison RN  Outcome: Ongoing     Problem: Skin Integrity:  Goal: Will show no infection signs and symptoms  Description: Will show no infection signs and symptoms  7/31/2021 1809 by Coretta Brady RN  Outcome: Ongoing  7/31/2021 0515 by Joseph Harrison RN  Outcome: Ongoing  Goal: Absence of new skin breakdown  Description: Absence of new skin breakdown  7/31/2021 1809 by Coretta Brady RN  Outcome: Ongoing  7/31/2021 0515 by Joseph Harrison RN  Outcome: Ongoing     Problem: Falls - Risk of:  Goal: Will remain free from falls  Description: Will remain free from falls  7/31/2021 1809 by Coretta Brady RN  Outcome: Ongoing  7/31/2021 0515 by Joseph Harrison RN  Outcome: Met This Shift  Goal: Absence of physical injury  Description: Absence of physical injury  7/31/2021 1809 by Coretta Brady RN  Outcome: Ongoing  7/31/2021 0515 by Joseph Harrison RN  Outcome: Met This Shift     Problem: Nutrition  Goal: Optimal nutrition therapy  7/31/2021 1809 by Coretta Brady RN  Outcome: Ongoing  7/31/2021 0515 by Joseph Harrison RN  Outcome: Ongoing

## 2021-07-31 NOTE — PROGRESS NOTES
masses, hepatomegaly, splenomegaly  Extremities:  no edema, redness, tenderness in the calves  Skin: Rash posterior back    Data Review:    Labs and Imaging:     CBC:  Recent Labs     07/28/21  1706 07/29/21  0320 07/30/21  0608 07/31/21  0831   WBC  --  20.8* 25.2* 24.9*   HGB 9.8* 9.5* 9.4* 9.8*   MCV  --  93.0 91.7 92.3   RDW  --  13.3 13.2 13.1   PLT  --  377 412 467*       ANEMIA STUDIES:  No results for input(s): LABIRON, TIBC, FERRITIN, [id number], FOLATE, OCCULTBLD in the last 72 hours. BMP:  Recent Labs     07/29/21  0320 07/29/21  0320 07/30/21  0608 07/30/21 2015 07/31/21  0831     --  137  --  139   K 3.7   < > 3.3* 3.9 4.2     --  100  --  100   CO2 23  --  21  --  24   BUN 9  --  7  --  5*   CREATININE 0.53  --  0.52  --  0.50   GLUCOSE 89  --  78  --  87   CALCIUM 8.5*  --  8.4*  --  8.9    < > = values in this interval not displayed. LFTS:  Recent Labs     07/31/21  0831   ALKPHOS 440*   ALT 21   AST 33*   BILITOT 0.47   BILIDIR 0.31*   LABALBU 2.6*       Amylase/Lipase and Ammonia:  Recent Labs     07/31/21  0831   AMYLASE 35   LIPASE 80*       Acute Hepatitis Panel:  Lab Results   Component Value Date    HEPBSAG NONREACTIVE 07/14/2019    HEPCAB NONREACTIVE 07/14/2019    HEPBIGM NONREACTIVE 07/14/2019    HEPAIGM NONREACTIVE 07/14/2019       HCV Genotype:  No results found for: HEPATITISCGENOTYPE    HCV Quantitative:  No results found for: HCVQNT    LIVER WORK UP:    AFP  Lab Results   Component Value Date    AFP 7.3 07/21/2021       Alpha 1 antitrypsin   No results found for: A1A    BRADLEY  Lab Results   Component Value Date    BRADLEY NEGATIVE 04/13/2021       AMA  No results found for: MITOAB    ASMA  No results found for: SMOOTHMUSCAB    PT/INR  No results for input(s): PROTIME, INR in the last 72 hours. Cancer Markers:  CEA:  No results for input(s): CEA in the last 72 hours. Ca 125:  No results for input(s):  in the last 72 hours.   Ca 19-9:   Invalid input(s):   AFP: No results for input(s): AFP in the last 72 hours. Lactic acid:Invalid input(s): LACTIC ACID    Radiology Review:    No results found. 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24, the patient underwent 2 surgical resections without known recurrence    Metabolic encephalopathy    AMAN (generalized anxiety disorder)    Chronic peripheral neuropathic pain    History of alcohol abuse    Personal history of astrocytoma    Marijuana abuse    Closed fracture of fifth thoracic vertebra (HCC)    Diverticulitis of large intestine with abscess without bleeding    Elevated brain natriuretic peptide (BNP) level    Elevated alkaline phosphatase level    Chronic pancreatitis (HCC)    Erythema ab igne    Compression fracture of thoracic vertebra (HCC)    Compression of lumbar vertebra (HCC)    Metabolic acidosis with increased anion gap and accumulation of organic acids    Parapneumonic effusion    Septicemia (HCC)    Alcohol-induced acute pancreatitis  Resolved Problems:    * No resolved hospital problems. *       GI Impression:    1. Acute diverticulitis with possible perforation-FRANSISCO drain in place with serosanguineous output  2. Acute abdominal pain secondary to #1  3. Leukocytosis most likely due to #1  4. Repeated pleural effusions-S/P thoracentesis x2  5. History of alcohol misuse disorder-last drink 12/2021  6. Multiple lumbar compression fractures      Plan and Recommendations:    1. We will follow up on MRI MRCP  2. Continue antibiotic management per ID  3. Continue supportive care with IV fluids, antiemetics, pain medication  4.  Patient is in need of nutrition- will need PICC line for TPN to give bowel rest if  recommends n.p.o.- will follow up on MRCP this afternoon then make further recommendations   5. Will follow      This plan was formulated in collaboration with Dr. Kyle Martines MD    Thank you for allowing me to participate in the care of your patient. Please feel free to contact me with any questions or concerns. 2 [facility]    This note was created with the assistance of a speech-recognition program.  Although the intention is to generate a document that actually reflects the content of the visit, no guarantees can be provided that every mistake has been identified and corrected by editing.

## 2021-07-31 NOTE — PROGRESS NOTES
[facility] Sepsis ([facility] [address] [A41.9] . Vitals:    07/30/21 2034   BP:    Pulse:    Resp: 20   Temp:    SpO2: 97%   . Patients home meds are   Prior to Admission medications    Medication Sig Start Date End Date Taking? Authorizing Provider   acamprosate (CAMPRAL) 333 MG tablet Take 2 tablets by mouth 3 times daily 7/16/21 8/15/21  Nadia Bernard APRN - NP   sertraline (ZOLOFT) 50 MG tablet Take 3 tablets by mouth daily for 7 days, THEN 2 tablets daily for 7 days, THEN 1 tablet daily for 7 days. 7/16/21 8/6/21  Nadia Bernard APRN - NP   FLUoxetine (PROZAC) 20 MG tablet Take 0.5 tablets by mouth daily for 7 days, THEN 1 tablet daily for 23 days. 7/16/21 8/15/21  Nadia Bernard APRN - NP   traZODone (DESYREL) 50 MG tablet TAKE 1 AND 1/2 TABLET BY MOUTH ONCE NIGHTLY AS NEEDED FOR SLEEP 7/16/21   Nadia Bernard APRN - NP   thermotabs (MEDI-LYTE) TABS tablet Take 1 tablet by mouth daily for 3 days 7/9/21 7/12/21  LOI Prince NP   carBAMazepine (TEGRETOL XR) 200 MG extended release tablet Take 1 tablet by mouth 2 times daily 7/6/21   Andrei Rosenberg MD   topiramate (TOPAMAX) 25 MG tablet Take 2 tablets by mouth daily 7/6/21   Andrei Rosenberg MD   pregabalin (LYRICA) 200 MG capsule Take 1 capsule by mouth 3 times daily for 90 days.  7/2/21 9/30/21  Andrei Rosenberg MD   KLOR-CON M20 20 MEQ extended release tablet TAKE ONE TABLET BY MOUTH DAILY 6/24/21   Ludivina Cody MD   amLODIPine (NORVASC) 5 MG tablet TAKE ONE TABLET BY MOUTH DAILY 6/3/21   Ludivina Cody MD   busPIRone (BUSPAR) 15 MG tablet Take 1 tablet by mouth 3 times daily 5/5/21   Ludivina Cody MD   baclofen (LIORESAL) 10 MG tablet Take 1 tablet by mouth 3 times daily 5/25/21   Ludivina Cody MD   ibuprofen (ADVIL;MOTRIN) 600 MG tablet Take 1 tablet by mouth 3 times daily as needed for Pain 5/25/21   Ludivina Cody MD   sertraline (ZOLOFT) 100 MG tablet Take 2 tablets by mouth daily 5/17/21   LOI Zuluaga - NP   ferrous sulfate (IRON 325) 325 (65 Fe) MG tablet Take 1 tablet by mouth 2 times daily 4/22/21   Ludivina Webb MD   rOPINIRole (REQUIP) 1 MG tablet Take 1 tablet by mouth nightly 4/1/21   Ludivina Webb MD   budesonide-formoterol [facility]) 160-4.5 MCG/ACT AERO Inhale 2 puffs into the lungs 2 times daily 3/31/21   Ludivina Webb MD   hydrOXYzine (ATARAX) 50 MG tablet TAKE ONE TABLET BY MOUTH THREE TIMES A DAY 3/12/21   Ludivina Webb MD   tiotropium (SPIRIVA RESPIMAT) 2.5 MCG/ACT AERS inhaler Inhale 2 puffs into the lungs daily 2/26/21 7/8/21  Jayden Wharton MD   sodium chloride (ALTAMIST SPRAY) 0.65 % nasal spray 1 spray by Nasal route as needed for Congestion 12/28/20   Ludivina Webb MD   ACETAMINOPHEN EXTRA STRENGTH 500 MG tablet Take 500 mg by mouth 3 times daily as needed 5/8/20   Historical Provider, MD   albuterol sulfate HFA (VENTOLIN HFA) 108 (90 Base) MCG/ACT inhaler Inhale 2 puffs into the lungs 4 times daily as needed for Wheezing 6/11/20   Ludivina Webb MD   vitamin B-1 (THIAMINE) 100 MG tablet Take 1 tablet by mouth daily 7/12/19   Ludivina Webb MD   vitamin D (ERGOCALCIFEROL) 12955 units CAPS capsule Take 1 capsule by mouth once a week 6/19/19   Ludivina Webb MD   folic acid (FOLVITE) 1 MG tablet Take 1 tablet by mouth daily 6/19/19   Ludivina Webb MD   Assessment   Patient has a history of COPD, appears to take tx's prn at home. Breath sounds are clear/diminished with a stronger more productive cough, plan to dc metaneb and continue with IS, repeat x-ray not done since 7/29.    RR 16  Breath Sounds: Clear/diminished    Bronchodilator assessment at level  2  Secretion Management assessment at level  1  [x]    Bronchodilator Assessment  BRONCHODILATOR ASSESSMENT SCORE  Score 0 1 2 3 4 5   Breath Sounds   []  Patient Baseline [x]  No Wheeze good aeration []  Faint, scattered wheezing, good aeration []  Expiratory Wheezing and or moderately diminished []  Insp/Exp wheeze and/or very diminished []  Insp/Exp and/ or marked distress   Respiratory Rate   []  Patient Baseline [x]  Less than 20 [x]  Less than 20 []  20-25 []  Greater than 25 []  Greater than 25   Peak flow % of Pred or PB [x]  NA   []  Greater than 90%  []  81-90% []  71-80% []  Less than or equal to 70%  or unable to perform []  Unable due to Respiratory Distress   Dyspnea re []  Patient Baseline []  No SOB [x]  No SOB []  SOB on exertion []  SOB min activity []  At rest/acute   e FEV% Predicted       [x]  NA []  Above 69%  []  Unable []  Above 60-69%  []  Unable []  Above 50-59%  []  Unable []  Above 35-49%  []  Unable []  Less than 35%  []  Unable            []  Secretion Management Assessment  Score 1 2 3   Bilateral Breath Sounds   [x]  Occasional Rhonchi []  Scattered Rhonchi []  Course Rhonchi and/or poor aeration   Sputum    [x]  Small amount of thin secretions []  Moderate amount of viscous secretions []  Copius, Viscious Yellow/ Secretions   CXR as reported by physician []  clear  [x]  Unavailable []  Infiltrates and/or consolidation  [x]  Unavailable []  Mucus Plugging and or lobar consolidation  []  Unavailable   Cough [x]  Strong, productive cough []  Weak productive cough []  No cough or weak non-productive cough   Bethene Sicard, RCP  10:07 PM

## 2021-07-31 NOTE — PLAN OF CARE
Educated patient on pain scale for assessing level of pain, the need to notify a [facility] provider of episodes of pain, pharmacologic/non-pharmacologic pain management,and potential side effects of prescribed medications. Educate patient on safety precautions and fall prevention measures. Bed/Chair alarms remain on. Falling star in place. Fall sticker on ID band. Non-slip socks on. Clutter free environment. Inspect skin daily. Elevate heels off of the bed at all times. Turn/reposition every 2 hours. Mepilex to coccyx for prevention and assess every shift. Compression therapy to bilateral legs. Educate on how to cough and deep breathe, dyspnea management, and optimal breathing techniques. Give incentive spirometer, instruct how to properly use it, and educate benefits of using. Educate patient on cardiac monitoring, information regarding decreased cardiac output, energy conservation techniques and activity level. Maintain telemetry and Spo2 monitoring. Check vitals every 4 hrs and PRN.

## 2021-07-31 NOTE — PROGRESS NOTES
PULMONARY & CRITICAL CARE MEDICINE PROGRESS NOTE     Patient:  Yuliet Beverly  MRN: [id number]  Admit date: 7/21/2021  Primary Care Physician: Brian Hudson MD  Consulting Physician: Kaveh Cruz MD  CODE Status: Full Code  LOS: 10     SUBJECTIVE     CHIEF COMPLAINT/REASON FOR INITIAL CONSULT: Acute respiratory failuree respiratory failure  BRIEF HOSPITAL COURSE:   The patient is a 45 y.o. female admitted with abdominal and back pain. She was found to have thoracic spine fracture. Her CT abdomen done on 7/27 showed a complex fluid collection 9.3 x 7.3 x 5.4 cm in the left upper quadrant and left lower lobe consolidation/pleural effusion. INTERVAL HISTORY:  07/31/21  NO ACUTE COMP  FEELS BETTER   LEFT UPPER QUADRANT DRAIN   CULTURES FROM LEFT PLEURAL FLUID NEGATIVE SO FAR     REVIEW OF SYSTEMS:  Review of Systems   Constitutional: Positive for fatigue. Negative for appetite change, chills, fever and unexpected weight change. HENT: Negative for congestion, postnasal drip, sore throat and trouble swallowing. Eyes: Negative for visual disturbance. Respiratory: Positive for cough and shortness of breath. Negative for wheezing. Cardiovascular: Negative for chest pain, palpitations and leg swelling. Gastrointestinal: Positive for abdominal pain. Negative for constipation, diarrhea, nausea and vomiting. Genitourinary: Negative for difficulty urinating, dysuria and frequency. Musculoskeletal: Negative for arthralgias and joint swelling. Skin: Negative for rash. Allergic/Immunologic: Negative for immunocompromised state. Neurological: Positive for weakness. Negative for dizziness, speech difficulty and headaches. Hematological: Negative for adenopathy. Psychiatric/Behavioral: Positive for confusion. Negative for behavioral problems and sleep disturbance. OBJECTIVE     PaO2/FiO2 RATIO:  No results for input(s): POCPO2 in the last 72 hours.    FiO2 : (S) 50 %     VITAL SIGNS: LAST:  /88   Pulse 99   Temp 98.8 °F (37.1 °C) (Oral)   Resp 26   Ht 5' 5\" (1.651 m)   Wt 142 lb 9.6 oz (64.7 kg)   SpO2 98%   BMI 23.73 kg/m²   8-24 HR RANGE:  TEMP Temp  Av °F (37.2 °C)  Min: 98.8 °F (37.1 °C)  Max: 99.2 °F (84.4 °C)   BP Systolic (52TZY), AUE:125 , Min:113 , GPC:894      Diastolic (32FDR), PPK:61, Min:78, Max:102     PULSE Pulse  Av.3  Min: 88  Max: 104   RR Resp  Av.5  Min: 19  Max: 27   O2 SAT SpO2  Av %  Min: 87 %  Max: 99 %   OXYGEN DELIVERY No data recorded        SYSTEMIC EXAMINATION:   General appearance - Ill-appearing, mild  respiratory distress  Mental status - awake & alert, follows commands  Eyes - pupils equal and reactive, sclera anicteric  Mouth - mucous membranes moist  Neck - supple, no significant adenopathy, carotids upstroke normal bilaterally, no bruits  Chest - Breath sounds bilaterally were dimnished to auscultation at bases  Heart - normal rate, regular rhythm, normal S1, S2, no murmurs, rubs, clicks or gallops  Abdomen - soft, left upper quadrant tenderness WITH DRAIN , no masses or organomegaly  Neurological - non-focal  Extremities - peripheral pulses normal, no pedal edema, no clubbing or cyanosis  Skin - normal coloration and turgor, no rashes, no suspicious skin lesions noted     DATA REVIEW     Medications: Current Inpatient  Scheduled Meds:   sodium chloride flush  5-40 mL Intravenous BID    pantoprazole  40 mg Oral QAM AC    guaiFENesin  600 mg Oral BID    nicotine  1 patch Transdermal Daily    ipratropium-albuterol  1 ampule Inhalation TID    lidocaine  1 patch Transdermal Daily    spironolactone  50 mg Oral Daily    polyethylene glycol  17 g Oral BID    carBAMazepine  200 mg Oral TID    topiramate  50 mg Oral BID    amLODIPine  5 mg Oral Daily    baclofen  10 mg Oral TID    budesonide-formoterol  2 puff Inhalation BID    busPIRone  15 mg Oral TID    ferrous sulfate  325 mg Oral BID    folic acid  1 mg Oral Daily  potassium chloride  20 mEq Oral Daily with breakfast    pregabalin  200 mg Oral TID    rOPINIRole  1 mg Oral Nightly    sodium chloride flush  5-40 mL Intravenous 2 times per day    enoxaparin  40 mg Subcutaneous Daily     Continuous Infusions:   lactated ringers 100 mL/hr at 07/31/21 1505    sodium chloride 25 mL (07/24/21 1803)       INPUT/OUTPUT:  In: 2464 [I.V.:2414]  Out: 3373 [Urine:1700; Drains:115]  Date 07/31/21 0000 - 07/31/21 2359   Shift 2733-5342 6598-8736 9625-2665 24 Hour Total   INTAKE   I.V.(mL/kg) 3368(58.0)  7112(82.5) 9656(76.4)   IV Piggyback(mL/kg)   50(0.8) 50(0.8)   Shift Total(mL/kg) 8911(37.8)  0692(12.0) 3483(06.0)   OUTPUT   Urine(mL/kg/hr)  950(1.8) 750 1700   Drains(mL/kg) 115(1.8)   115(1.8)   Shift Total(mL/kg) 115(1.8) 950(14.7) 750(11.6) 1815(28.1)   Weight (kg) 64.7 64.7 64.7 64.7        LABS:  ABGs:   No results for input(s): POCPH, POCPCO2, POCPO2, POCHCO3, BZKW9SLG in the last 72 hours. CBC:   Recent Labs     07/29/21  0320 07/30/21  0608 07/31/21  0831   WBC 20.8* 25.2* 24.9*   HGB 9.5* 9.4* 9.8*   HCT 30.5* 28.8* 31.0*   MCV 93.0 91.7 92.3    412 467*   LYMPHOPCT 14* 11* 10*   RBC 3.28* 3.14* 3.36*   MCH 29.0 29.9 29.2   MCHC 31.1 32.6 31.6   RDW 13.3 13.2 13.1     CRP:   No results for input(s): CRP in the last 72 hours. LDH:   No results for input(s): LDH in the last 72 hours. BMP:   Recent Labs     07/29/21  0320 07/30/21  0608 07/30/21 2015 07/31/21  0831    137  --  139   K 3.7 3.3* 3.9 4.2    100  --  100   CO2 23 21  --  24   BUN 9 7  --  5*   CREATININE 0.53 0.52  --  0.50   GLUCOSE 89 78  --  87     Liver Function Test:   Recent Labs     07/31/21  0831   PROT 6.1*   LABALBU 2.6*   ALT 21   AST 33*   ALKPHOS 440*   BILITOT 0.47     Coagulation Profile:   No results for input(s): INR, PROTIME, APTT in the last 72 hours. D-Dimer:  No results for input(s): DDIMER in the last 72 hours.   Lactic Acid:  No results for input(s): LACTA in the last 72 hours. Cardiac Enzymes:  No results for input(s): CKTOTAL, CKMB, CKMBINDEX, TROPONINI in the last 72 hours. Invalid input(s): TROPONIN, HSTROP  BNP/ProBNP:   No results for input(s): BNP, PROBNP in the last 72 hours. Triglycerides:  No results for input(s): TRIG in the last 72 hours. Microbiology:  Urine Culture:  No components found for: CURINE  Blood Culture:  No components found for: CBLOOD, CFUNGUSBL  Sputum Culture:  No components found for: CSPUTUM  Recent Labs     07/30/21  1720   SPECDESC . THORACENTESIS FLUID   SPECIAL NOT REPORTED   CULTURE NO GROWTH 14 HOURS     Recent Labs     07/30/21  1720   SPECDESC . THORACENTESIS FLUID   SPECIAL NOT REPORTED   CULTURE NO GROWTH 14 HOURS        Pathology:    Radiology Reports:  MRI ABDOMEN W WO CONTRAST MRCP   Final Result   1. Suggestion of interstitial edematous pancreatitis in the pancreatic tail,   consistent with acute on chronic pancreatitis given the CT appearance. 2. Approximately 13.8 cm x 6.5 cm x 2.3 cm heterogeneous collection in the   left subphrenic space. Moderate fat necrosis could have this appearance, but   the affected area is outside the pancreas with no findings of necrotizing   pancreatitis. Additionally, the finding was absent less than 4 weeks prior. There is suspicion for continuity of the lesion with the main pancreatic duct   in the tail, although this is incompletely evaluated due to only incomplete   inclusion of the affected area on the 3D MRCP sequence. This suggest the   possibility of fat necrosis from leaked pancreatic fluid. Abscess is an   additional consideration. 3. Mild dilation of the upstream pancreatic duct with abrupt caliber change   in the pancreatic neck potentially due to a filling defect such as   calcification as seen on CT or a stricture. There are no findings suggestive   of an obstructing malignancy.    4. Mild intrahepatic and extrahepatic biliary dilation of indeterminate   etiology with no findings of choledocholithiasis. Consider ERCP if there are   clinical findings of cholestasis. 5. Left para-aortic lymphadenopathy is likely reactive. Recommend attention   on follow-up imaging. US THORACENTESIS Which side should the procedure be performed? Left   Final Result   Successful ultrasound guided left thoracentesis. CT ABDOMEN PELVIS W IV CONTRAST Additional Contrast? Oral and Rectal   Final Result   Increasing multiloculated rim enhancing fluid collections in the left upper   quadrant. Slightly decreased size of the dominant pocket of fluid which now   contains a percutaneous drainage catheter, though there are innumerable new   smaller collections and other previously present smaller collections which   are increased in size. I suspect many of these do not communicate with the   pocket of fluid containing the drainage catheter. Excellent opacification of the colon with enteric contrast without definitive   enteric contrast in the left upper quadrant collection to confirm a colonic   source. Pancreatic source is a possible alternative consideration in the   appropriate clinical setting. Persistent colonic wall thickening in the region of the hepatic flexure on a   background of probable ileus. Increased urinary bladder wall thickening with slightly increased perinephric   stranding. Correlate with urinary labs for contributory urinary tract   infection. XR CHEST (SINGLE VIEW FRONTAL)   Final Result   Pulmonary congestion with bibasilar effusions and adjacent infiltrates. IR GUIDED THORACENTESIS PLEURAL   Final Result   Successful ultrasound guided thoracentesis. CT ABSCESS DRAINAGE   Final Result   Successful percutaneous ultrasound and CT guided placement of 8 [place] left   upper quadrant drainage catheter.       New high attenuation material within the multi loculated left upper quadrant   fluid collection suggests leakage from the adjacent abnormal colonic segment   indicating micro perforation. The above findings were called by Dr. Ulysses Acosta, MD to Dr. Jinny Lucero On 7/28/2021 at 12:32. CT ABDOMEN PELVIS W IV CONTRAST Additional Contrast? Radiologist Recommendation   Final Result   1. Large complex fluid collection in the left upper quadrant, concerning for   abscess. This is likely related to the previously described colitis. 2. Consolidation and ground-glass opacity in both lower lobes with left   pleural effusion, concerning for multifocal pneumonia. 3. Diffuse urinary bladder wall thickening. This could be due to   underdistention, but correlation for any signs or symptoms of cystitis is   recommended. 4. Chronic pancreatitis. 5. Nonobstructing left nephrolithiasis. XR CHEST (SINGLE VIEW FRONTAL)   Final Result   Interval improvement in still mild interstitial edema with bilateral   asymmetric, left greater than right, pleural effusions         CT HEAD WO CONTRAST   Final Result   No acute intracranial abnormality. Suboccipital craniectomy. XR CHEST PORTABLE   Final Result   1. Increasing vascular congestion and left perihilar opacity. 2.  Pleural effusions and basilar opacities are otherwise without significant   change. XR ACUTE ABD SERIES CHEST 1 VW   Final Result   Bilateral airspace disease and pleural effusions. Findings in the upper   lobes appear increased compared to prior, left greater than right. Findings   may be related to asymmetric edema with superimposed pneumonia not excluded. Gas and stool are seen throughout nondilated bowel loops with few nonspecific   air-fluid levels in the right lower abdomen, likely in small bowel. Findings   may be physiologic or related to mild ileus. No evidence of obstruction or   free air. XR LUMBAR SPINE (2-3 VIEWS)   Final Result   Lumbar spine: Superior endplate fracture L5 which appears acute to subacute. No subluxation. Other vertebra well maintained. Spine significant compression deformity T6 with there are endplate loss in   height T8, T9 and T10. No subluxation. Mild levo scoliotic curvature. Osteopenia complicates assessment. Pedicles are intact. Low lung volumes   complicate assessment. There is suggestion of pleural effusions. XR THORACIC SPINE (3 VIEWS)   Final Result   Lumbar spine: Superior endplate fracture L5 which appears acute to subacute. No subluxation. Other vertebra well maintained. Spine significant compression deformity T6 with there are endplate loss in   height T8, T9 and T10. No subluxation. Mild levo scoliotic curvature. Osteopenia complicates assessment. Pedicles are intact. Low lung volumes   complicate assessment. There is suggestion of pleural effusions. MRI THORACIC SPINE WO CONTRAST   Final Result   1. Limited examination. 2. Compression deformities involving T6, T8, T9 and T11. These are likely   chronic in nature. Diffusely decreased T1 and T2 marrow signal within the T6   vertebral body compatible with the sclerosis seen on the prior CT. 3. There is minimal bone marrow edema involving the left lateral elements of   T6 and T7. Unclear whether this is degenerative in nature or perhaps   sequelae of recent or remote trauma. 4. No significant spinal canal stenosis of the thoracic spine. 5. Moderate bilateral neural foraminal narrowing at T6-T7.   6. Bilateral pleural effusions, left greater than right. MRI LUMBAR SPINE WO CONTRAST   Final Result   1. Acute compression deformity of the superior endplate of L5 with   approximately 35% loss of height. No significant bulging of the posterior   cortex. 2. No significant spinal canal stenosis of the lumbar spine. 3. Neural foraminal narrowing at L3-L4 through L5-S1.   4. Minimal retrolisthesis at L5-S1.          XR CHEST PORTABLE   Final Result   Interval development of bibasilar infiltrates and small pleural effusions   which could represent dependent edema, pneumonia or aspiration. XR ABDOMEN (KUB) (SINGLE AP VIEW)   Final Result   Mild dilation of the right hemicolon and patchy small bowel gas most likely   due to ileus. XR CHEST PORTABLE   Final Result   No radiologic evidence of acute cardiopulmonary disease. CT CHEST ABDOMEN PELVIS W CONTRAST   Final Result   Probable acute diverticulitis or colitis left colon. No evidence of   perforation or abscess at this time. Chronic pancreatitis. Multiple compression fractures some of which are new since the previous exam.      Lingular ground-glass infiltrate. Recommend follow-up to resolution. Intra-atrial lipoma right atrium. Cardiac echo may be helpful. CT LUMBAR SPINE TRAUMA RECONSTRUCTION   Final Result   CT thoracic spine:      1. Decrease in height in T6 compared to prior study compatible with   worsening compression with subacute etiology and mild protrusion of the   dorsal aspect of T6. Narrowed T6-T7 neural foramina. 2.  Chronic superior height T8, T9, and T11 without interval change from   prior study. 3.  Mild prevertebral soft tissue prominence T6 without evidence of abscess   formation. CT lumbar spine:      1. No traumatic malalignment. 2.  Compression fracture superior L5 with subacute appearance. 3.  Calcifications in the abdomen incompletely included appearance suggesting   pancreatic calcifications. RECOMMENDATIONS:   Please reference CT chest, abdomen and pelvis of same day. CT THORACIC SPINE TRAUMA RECONSTRUCTION   Final Result   CT thoracic spine:      1. Decrease in height in T6 compared to prior study compatible with   worsening compression with subacute etiology and mild protrusion of the   dorsal aspect of T6. Narrowed T6-T7 neural foramina.       2.  Chronic superior height T8, T9, and T11 without interval change from on nasal cannula  Continue nocturnal and as needed BiPAP  Continue supplemental oxygen to keep oxygen saturation >90%  Encourage incentive spirometry  Continue pulmonary toilet, aspiration precautions and bronchodilators  Monitor I/O, electrolytes with a goal of even/negative fluid balance  Monitor output from left upper quadrant drain  General surgery following  Stress ulcer prophylaxis  Chemical DVT prophylaxis as per protocol  Antimicrobials reviewed; continue Zosyn as per ID recommendation, follow-up culture results  Skin/wound care reviewed with the nursing staff  Physical/occupational therapy  MRCP TODAY   I would like to thank you for allowing me to participate in the care of this patient. Please feel free to call with any further questions or concerns. Maribel Laws MD  Pulmonary and Critical Care Medicine           7/31/2021     Please note that this chart was generated using voice recognition Dragon dictation software. Although every effort was made to ensure the accuracy of this automated transcription, some errors in transcription may have occurred.

## 2021-07-31 NOTE — PROGRESS NOTES
[facility] -   Infectious diseases evaluation  admission date 7/21/2021    reason for consultation:   Diverticulosis/ leukocytosis and rash    Impression :   Current:  · bandemia  · Acute diverticulitis or L colitis  · Pancreatitis of the tail w surrounding peripanc fluid collections  · Peripancreatic abd bloody fluid collection - drain placed 7/28 cx neg  · Lingular infiltrate  · Left large pleural effusion, reactive and cx neg 7/28   · Lactic acidosis  · 7/21/21 new Skin rash on the back - livedo reticularis   · CRP elevation  · Anxiety    Other:  · Copd w prednisone  · Hx astrocytoma age 24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 recurrent -  · SZ on tx  · Osteoporosis  · Migraine headache   · Chronic pancreatitis  Discussion / summary of stay / plan of care   ·   Recommendations   · zosyn for now -7/21   · CT AP showed multiple collections possibly bloody, GS on board  · GS on board, amylase in pleural and periton fluid, cx neg    Infection Control Recommendations   · [place] Precautions    Antimicrobial Stewardship Recommendations   · Simplification of therapy  · Targeted therapy    Coordination ofOutpatient Care:   · Estimated Length of IV antimicrobials:  · Patient will need Midline / picc Catheter Insertion:   · Patient will need SNF:  · Patient will need outpatient wound care:     History of Present Illness:   Initial history:  Crisoforo Snellen is a 45y.o.-year-old female w abd  And back pain and hospital and found T spine cp fracture acute and sub acute, no sx plans,   Found w lingular infiltrate and started on zosyn, for presumed aspiration pneumonia. Also on CTAP left diverticulitis and no abscess or perforation seen, but surround left colitis seen as well. Started on zosyn since 7/21 and eating and feels better,m still a little tired and sleepy but eating. leukocytosis still elevated while getting steroids for COPD.   Rash on the back noticed since prior to admission, unclear cause-    ID called for the leukocytosis and rash. On off - unclear if present x long time PTA since she lives by herself and the rash is only on the back and post right arm. She had fevers at admission and is down to LGF since    On exam she is very anxious and ox 3 - easily emotional              Interval changes  7/30/2021   Patient Vitals for the past 8 hrs:   BP Temp Temp src Pulse Resp SpO2   07/30/21 2034 -- -- -- -- 20 97 %   07/30/21 2023 -- -- -- -- 16 98 %   07/30/21 2003 (!) 152/102 99 °F (37.2 °C) Oral 95 (!) 33 95 %   07/30/21 1905 -- -- -- 93 16 --   07/30/21 1806 -- -- -- 98 20 --   07/30/21 1703 (!) 145/85 -- -- 100 -- 92 %   07/30/21 1658 (!) 157/98 -- -- 100 16 98 %   07/30/21 1622 (!) 156/89 98.1 °F (36.7 °C) Oral 93 19 97 %   07/30/21 1358 -- -- -- 91 17 97 %   07/30/21 1345 (!) 138/92 -- -- 92 22 97 %     T max 101 on 7/28 -  LGF since  abd tenderness worse, FRANSISCO still bloody and no stools or pus or smell  Feels ill again    CT AP 7/30. No intest perforation and abscesses are larger and increased in number. CT AP show 7/27 left abd collection, and left pleura moderate size effusion - GS note appreciated    IR drainage: 7/28/21  · Pleural: PH   7.5, prot 3.6, LDH 2125, amylase 4100, WBC <3000, RBC 2125. cx pend neg    · abd fluid collection drain placed and fluid is serosang, , RBC 12765, amylase 404, ,  cx pend neg    Labs reviewed    Summary of relevant labs:  Labs:  W 19 - 17 - 19 - 17 - 20 - 25  Creat  0.68   - 269 - 291  Procalcitonin0.16   Lactic Acid 8.7    Micro:   BC 7/27    Imaging:  CT AP 7/30/21  Increasing multiloculated rim enhancing fluid collections in the left upper   quadrant. Slightly decreased size of the dominant pocket of fluid which now   contains a percutaneous drainage catheter, though there are innumerable new   smaller collections and other previously present smaller collections which   are increased in size.   I suspect many of these do not communicate with the   pocket of fluid containing the drainage catheter. Excellent opacification of the colon with enteric contrast without definitive   enteric contrast in the left upper quadrant collection to confirm a colonic   source. Pancreatic source is a possible alternative consideration in the   appropriate clinical setting. Persistent colonic wall thickening in the region of the hepatic flexure on a   background of probable ileus. Increased urinary bladder wall thickening with slightly increased perinephric   stranding. Correlate with urinary labs for contributory urinary tract   infection. CXR 7/27  Interval improvement in still mild interstitial edema with bilateral   asymmetric, left greater than right, pleural effusions     CT AP 7/27  Large complex fluid collection in the left upper quadrant, concerning for   abscess. This is likely related to the previously described colitis. 2. Consolidation and ground-glass opacity in both lower lobes with left   pleural effusion, concerning for multifocal pneumonia. 3. Diffuse urinary bladder wall thickening. This could be due to   underdistention, but correlation for any signs or symptoms of cystitis is   recommended. 4. Chronic pancreatitis. 5. Nonobstructing left nephrolithiasis. CT head 7/25  No acute intracranial abnormality. Suboccipital craniectomy. CT AP 7/21   Probable acute diverticulitis or colitis left colon. No evidence of perforation or abscess at this time. Chronic pancreatitis. Multiple compression fractures some of which are new since the previous exam.   Lingular ground-glass infiltrate. Recommend follow-up to resolution. Intra-atrial lipoma right atrium. Cardiac echo may be helpful. I have personally reviewed the past medical history, past surgical history, medications, social history, and family history, and I haveupdated the database accordingly.       Allergies:   Patient has no known allergies. Review of Systems:     Review of Systems   Constitutional: Negative for activity change, appetite change, chills, diaphoresis and fatigue. HENT: Negative for congestion and rhinorrhea. Eyes: Negative for pain and discharge. Respiratory: Negative for apnea. Cardiovascular: Negative for chest pain and leg swelling. Gastrointestinal: Positive for abdominal pain. Negative for abdominal distention. Endocrine: Negative for heat intolerance and polyphagia. Genitourinary: Negative for dysuria and flank pain. Musculoskeletal: Negative for arthralgias. Skin: Positive for rash. Negative for color change. Allergic/Immunologic: Negative for immunocompromised state. Neurological: Positive for tremors. Negative for dizziness, seizures, speech difficulty and headaches. Hematological: Negative for adenopathy. Psychiatric/Behavioral: Negative for agitation. The patient is not nervous/anxious. Physical Examination :       Physical Exam  Constitutional:       General: She is not in acute distress. Appearance: Normal appearance. She is not ill-appearing, toxic-appearing or diaphoretic. HENT:      Head: Normocephalic and atraumatic. Nose: Nose normal.      Mouth/Throat:      Mouth: Mucous membranes are moist.   Eyes:      General: No scleral icterus. Pupils: Pupils are equal, round, and reactive to light. Cardiovascular:      Rate and Rhythm: Normal rate and regular rhythm. Heart sounds: Normal heart sounds. No murmur heard. No friction rub. No gallop. Pulmonary:      Effort: No respiratory distress. Breath sounds: Normal breath sounds. Abdominal:      General: There is no distension. Palpations: Abdomen is soft. Tenderness: There is abdominal tenderness. Right CVA tenderness: left lower quadrant. Genitourinary:     Comments: No helms  Musculoskeletal:         General: No swelling, deformity or signs of injury.       Cervical back: Neck supple. No rigidity or tenderness. Skin:     General: Skin is dry. Coloration: Skin is not jaundiced or pale. Findings: No bruising or erythema. Neurological:      General: No focal deficit present. Mental Status: She is alert and oriented to person, place, and time. Cranial Nerves: No cranial nerve deficit. Sensory: No sensory deficit. Psychiatric:         Mood and Affect: Mood normal.         Thought Content:  Thought content normal.         Past Medical History:     Past Medical History:   Diagnosis Date    Acute respiratory failure with hypoxia ([address] 10/16/2020    Alcohol withdrawal syndrome, with delirium ([address] 12/14/2019    Alcoholism ([address]     Anemia 10/2020    GI bleed    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24, the patient underwent 2 surgical resections without known recurrence 10/23/2020    Closed fracture of lateral portion of left tibial plateau 34/04/4940    COPD (chronic obstructive pulmonary disease) ([address]     CO2 retainer, on Bipap at night for this, Dr. Pierre Meyers ( last visit 11/20/2020 and note on chart )    Depression     bipolar, major depressive disorder, ptsd, anxiety    Dysphagia     GI bleed 10/2020    Hypertension     Memory loss     Oxygen dependent     pt stated not needed as of 12/9/2020    Pain, joint, ankle and foot     Pancreatic lesion 10/2020    Dr. Raciel Mazariegos working up pt    Peripheral neuropathy     Seizures ([address]     also baseline tremors-last sz summer 2020    Tension headache     Under care of team 07/01/2021    neuro-Dr Wan-st munoz-last visit june 2021    Under care of team 07/01/2021    pain management-Hamzah jimenez-last visit june 2021    Under care of team 07/01/2021    pulmonology-Dr Raudel munoz-last virtual visit feb 2021    Under care of team 07/01/2021    psych-teofiloeldt NP-telemed-last visit may 2021    Under care of team 07/01/2021    ti-[address]-last visit june 2021   [facility] examination 07/01/2021    [facility] 2021       Past Surgical  History:     Past Surgical History:   Procedure Laterality Date    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    COLONOSCOPY N/A 10/22/2020    COLONOSCOPY DIAGNOSTIC performed by Josey Rodriguez MD at [facility]    [address]  7/28/2021    CT ABSCESS DRAIN SUBCUTANEOUS 7/28/2021 [facility] CT SCAN    ENDOSCOPIC ULTRASOUND (LOWER) N/A 12/9/2020    ENDOSCOPIC ULTRASOUND, UPPER WITH LINEAR SCOPE FOR BIOPSY OF MASS ON HEAD OF PANCREAS performed by Gilles Boast, MD at [address] Left 7-3-13    ORIF tibial plateau    FRACTURE SURGERY Right     small finger metacarpal fracture    HAND SURGERY      pins    HYSTERECTOMY  2003    UPPER GASTROINTESTINAL ENDOSCOPY N/A 10/22/2020    EGD BIOPSY performed by Josey Rodriguez MD at [facility] N/A 4/5/2021    EGD BIOPSY performed by Josey Rodriguez MD at [facility]       Medications:      [START ON 7/31/2021] pantoprazole  40 mg Oral QAM AC    guaiFENesin  600 mg Oral BID    nicotine  1 patch Transdermal Daily    ipratropium-albuterol  1 ampule Inhalation TID    lidocaine  1 patch Transdermal Daily    spironolactone  50 mg Oral Daily    polyethylene glycol  17 g Oral BID    carBAMazepine  200 mg Oral TID    topiramate  50 mg Oral BID    amLODIPine  5 mg Oral Daily    baclofen  10 mg Oral TID    budesonide-formoterol  2 puff Inhalation BID    busPIRone  15 mg Oral TID    ferrous sulfate  325 mg Oral BID    folic acid  1 mg Oral Daily    potassium chloride  20 mEq Oral Daily with breakfast    pregabalin  200 mg Oral TID    rOPINIRole  1 mg Oral Nightly    sodium chloride flush  5-40 mL Intravenous 2 times per day    enoxaparin  40 mg Subcutaneous Daily    piperacillin-tazobactam  3,375 mg Intravenous Q8H       Social History:     Social History     Socioeconomic History    Marital status: Single     Spouse name: Not on file    Number of children: Not on file    Years of education: Not on file    Highest education level: Not on file   Occupational History    Not on file   Tobacco Use    Smoking status: Current Every Day Smoker     Packs/day: 0.50     Years: 30.00     Pack years: 15.00     Types: Cigarettes    Smokeless tobacco: Never Used    Tobacco comment: plans on quitting in the future    Vaping Use    Vaping Use: Never used   Substance and Sexual Activity    Alcohol use: Not Currently     Alcohol/week: 0.0 standard drinks    Drug use: Yes     Frequency: 2.0 times per week     Types: Marijuana    Sexual activity: Not on file   Other Topics Concern    Not on file   Social History Narrative    Not on file     Social Determinants of Health     Financial Resource Strain: Medium Risk    Difficulty of Paying Living Expenses: Somewhat hard   Food Insecurity: No Food Insecurity    Worried About Running Out of Food in the Last Year: Never true    Tyler of Food in the Last Year: Never true   Transportation Needs:     Lack of Transportation (Medical):      Lack of Transportation (Non-Medical):    Physical Activity:     Days of Exercise per Week:     Minutes of Exercise per Session:    Stress:     Feeling of Stress :    Social Connections:     Frequency of Communication with Friends and Family:     Frequency of Social Gatherings with Friends and Family:     Attends Religion Services:     Active Member of Clubs or Organizations:     Attends Club or Organization Meetings:     Marital Status:    Intimate Partner Violence:     Fear of Current or Ex-Partner:     Emotionally Abused:     Physically Abused:     Sexually Abused:        Family History:     Family History   Problem Relation Age of Onset    Other Father     Cancer Maternal Grandmother     Heart Disease Paternal Grandmother     Esophageal Cancer Maternal Aunt       Medical Decision Making: I have independently reviewed/ordered the following labs:    CBC with Differential:   Recent Labs     07/29/21  0320 07/30/21  0608   WBC 20.8* 25.2*   HGB 9.5* 9.4*   HCT 30.5* 28.8*    412   LYMPHOPCT 14* 11*   MONOPCT 6 9*     BMP:  Recent Labs     07/29/21  0320 07/29/21  0437 07/30/21  0608 07/30/21  0608 07/30/21  1850 07/30/21 2015     --  137  --   --   --    K 3.7   < > 3.3*  --   --  3.9     --  100  --   --   --    CO2 23  --  21  --   --   --    BUN 9  --  7  --   --   --    CREATININE 0.53  --  0.52  --   --   --    MG  --    < > 1.5*   < > 1.9 1.9    < > = values in this interval not displayed. Hepatic Function Panel: No results for input(s): PROT, LABALBU, BILIDIR, IBILI, BILITOT, ALKPHOS, ALT, AST in the last 72 hours. No results for input(s): RPR in the last 72 hours. No results for input(s): HIV in the last 72 hours. No results for input(s): BC in the last 72 hours. Lab Results   Component Value Date    CREATININE 0.52 07/30/2021    GLUCOSE 78 07/30/2021    GLUCOSE 92 04/30/2012       Detailed results: Thank you for allowing us to participate in the care of this patient. Please call with questions. This note is created with the assistance of a speech recognition program.  While intending to generate adocument that actually reflects the content of the visit, the document can still have some errors including those of syntax and sound a like substitutions which may escape proof reading. It such instances, actual meaningcan be extrapolated by contextual diversion.     Anjum Miller MD  Office: [phone]  Perfect serve / office [phone]

## 2021-07-31 NOTE — FLOWSHEET NOTE
Pt given a complete bedbath with chlorohexidine solution. New linens and  applied. New EKG patches applied. New brief applied. Pt boosted and positioned for comfort.

## 2021-07-31 NOTE — PROGRESS NOTES
PROGRESS NOTE      PATIENT NAME: Desmond Wong RECORD NO. [id number]  DATE: 7/31/2021  SURGEON: Brea Man  PRIMARY CARE PHYSICIAN: Glenda Dunne MD    HD: # 10    ASSESSMENT    Patient Active Problem List   Diagnosis    Acute bronchitis    Reflex sympathetic dystrophy of lower limb    Essential hypertension    Nicotine addiction    Psychophysiological insomnia    Anxiety    Alcoholic peripheral neuropathy (HCC)    Hypokalemia    Macrocytic anemia    Hypomagnesemia    Tobacco use    Ambulatory dysfunction    Seizure disorder ([address]    COPD with acute exacerbation ([address]    Paresthesia of lower extremity    Acute respiratory failure with hypoxia (HCC)    Pancreatic cyst    Recurrent major depressive disorder (HCC)    Elevated CA 19-9 level    Perineal mass, female    Esophagitis candidal     Condyloma    Astrocytoma (HCC) - 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24, the patient underwent 2 surgical resections without known recurrence    Alcohol use disorder, severe, in early remission ([address]    Metabolic encephalopathy    AMAN (generalized anxiety disorder)    Major depressive disorder, recurrent severe without psychotic features ([address]    Esophageal dysphagia    Chronic peripheral neuropathic pain    History of alcohol abuse    History of petit-mal seizures    Intractable episodic tension-type headache    Personal history of astrocytoma    Multilevel spine pain    Chronic pain syndrome    Marijuana abuse    Severe sepsis (HCC)    Closed fracture of fifth thoracic vertebra (HCC)    Diverticulitis of large intestine with abscess without bleeding    Elevated brain natriuretic peptide (BNP) level    Elevated alkaline phosphatase level    Chronic pancreatitis (HCC)    Erythema ab igne    Compression fracture of thoracic vertebra (HCC)    Compression of lumbar vertebra (HCC)    Metabolic acidosis with increased anion gap and accumulation of organic acids    Parapneumonic effusion    Septicemia [facility])       MEDICAL DECISION MAKING AND PLAN    Dmitri Wyman is a 45 y.o. female who originally presented to the ED with septicemia on 7/21. General surgery was consulted for severe abdominal pain that was progressively worsening. Lactate reached a high of 8.7 with a worsening clinical picture, and we recommended drainage by IR of the left intra-abdominal abscess. Drain is producing 195 cc total dark serosang fluid. Plan:     MRCP this afternoon   Follow/up CBC this am   Diet: NPO, possibly advancing diet as tolerated today   DVT ppx: Lovenox   Urine output: 1650 last 24 hours   Disposition: Clinically improving since IR drainage, will continue to monitor WBC and f/u MRCP          Horacio Solo is a 45 y.o. female with history of Astrocytoma s/p resection, COPD, diverticulitis and chronic pancreatitis who presents with worsening abdominal pain. Due to patient's mental status, she unable to participate with examination. Per previous documentation, patient presented to the emergency department on 7/21/2021 with generalized fatigue, headache and back pain. She was initiated on treatment for presumed aspiration pneumonia including IV antibiotics. She was also treated for acute COPD exacerbation and started on a steroid taper. CT chest abdomen pelvis on admission showed possible left diverticulitis/colitis with minimal abscess present. Over the course of her hospital stay, patient developed worsening mental status and a second CT scan was done, which showed a large complex fluid-filled collection in the left upper quadrant with concern for abscess. She was also found to have diffuse urinary bladder wall thickening and bilateral pleural effusions more severe on the left. Patient was found to have a lactic acidosis and a 8.7 and a leukocytosis of 19.3.      IR drained the abscess with CT which also showed possible microperforation with contrast leak from 2 areas of Thought Content: Thought content normal.         Judgment: Judgment normal.         Ins and outs: I/O last 3 completed shifts: In: 5926 [I.V.:2481; IV Piggyback:50]  Out: 6914 [Urine:1650; Drains:195; Stool:400]    Drain/tube output:  In: 7740 [I.V.:2481]  Out: 1164 [Urine:1650; Drains:195]    LAB:  CBC:   Recent Labs     07/28/21  1706 07/29/21  0320 07/30/21  0608   WBC  --  20.8* 25.2*   HGB 9.8* 9.5* 9.4*   HCT 31.2* 30.5* 28.8*   MCV  --  93.0 91.7   PLT  --  377 412     BMP:   Recent Labs     07/29/21 0320 07/30/21  0608 07/30/21 2015    137  --    K 3.7 3.3* 3.9    100  --    CO2 23 21  --    BUN 9 7  --    CREATININE 0.53 0.52  --    GLUCOSE 89 78  --      COAGS: No results for input(s): APTT, PROT, INR in the last 72 hours. RADIOLOGY:  New imaging:     MRCP pending      Arline Morales DO  7/31/21, 8:02 AM             Trauma Attending Quita Baker      I have reviewed the above [facility] note(s) and confirmed the key elements of the medical history and physical exam. I have seen and examined the pt. I have discussed the findings, established the care plan and recommendations with Resident, [facility] RN, bedside nurse.   Non-peritoneal   MRCP today       Jessica Sanders DO  7/31/2021  3:51 PM

## 2021-07-31 NOTE — PLAN OF CARE
Problem: RESPIRATORY  Intervention: Respiratory assessment  Note: BRONCHOSPASM/BRONCHOCONSTRICTION    IMPROVE  AERATION/BREATHSOUNDS  ADMINISTER BRONCHODILATOR THERAPY AS APPROPRIATE  ASSESS BREATH SOUNDS  PATIENT EDUCATION AS NEEDED     PROVIDE ADEQUATE OXYGENATION WITH ACCEPTABLE SP02/ABG'S    [x]  IDENTIFY APPROPRIATE OXYGEN THERAPY  [x]   MONITOR SP02/ABG'S AS NEEDED   [x]   PATIENT EDUCATION AS NEEDED     MOBILIZE SECRETIONS    [x]   ASSESS BREATH SOUNDS  [x]   ASSESS SPUTUM PRODUCTION  [x]   COUGH AND DEEP BREATHING  [x]  IMPLEMENT SECRETION MANAGEMENT PROTOCOL  [x]   PATIENT EDUCATION AS NEEDED     PATIENT REFUSES TO WEAR BIPAP     [x] Risks and benefits explained to patient   [x] Patient refuses to wear Bipap stating \"I want to pass on that tonight, I will wear it around 8AM tomorrow morning though\"  [x] Patient verbalizes understanding of information presented.

## 2021-07-31 NOTE — PROGRESS NOTES
[facility] -   Infectious diseases evaluation  admission date 7/21/2021    reason for consultation:   Diverticulosis/ leukocytosis and rash    Impression :   Current:  · bandemia  · Acute diverticulitis or L colitis  · Pancreatitis of the tail w surrounding peripanc fluid collections  · Peripancreatic abd bloody fluid collection - drain placed 7/28 cx neg  · Lingular infiltrate  · Left large pleural effusion, reactive and cx neg 7/28   · Lactic acidosis  · 7/21/21 new Skin rash on the back - livedo reticularis   · CRP elevation  · Anxiety    Other:  · Copd w prednisone  · Hx astrocytoma 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 Infection Control Recommendations   · [place] Precautions    Antimicrobial Stewardship Recommendations   · Simplification of therapy  · Targeted therapy    Coordination ofOutpatient Care:   · Estimated Length of IV antimicrobials:  · Patient will need Midline / picc Catheter Insertion:   · Patient will need SNF:  · Patient will need outpatient wound care:     History of Present Illness:   Initial history:  Amarilis Pedersen is a 45y.o.-year-old female w abd  And back pain and hospital and found T spine cp fracture acute and sub acute, no sx plans,   Found w lingular infiltrate and started on zosyn, for presumed aspiration pneumonia. Also on CTAP left diverticulitis and no abscess or perforation seen, but surround left colitis seen as well. Started on zosyn since 7/21 and eating and feels better,m still a little tired and sleepy but eating.   leukocytosis still elevated while getting steroids for COPD. Rash on the back noticed since prior to admission, unclear cause-    ID called for the leukocytosis and rash. On off - unclear if present x long time PTA since she lives by herself and the rash is only on the back and post right arm. She had fevers at admission and is down to LGF since    On exam she is very anxious and ox 3 - easily emotional              Interval changes  7/31/2021   Patient Vitals for the past 8 hrs:   BP Temp Temp src Pulse Resp SpO2   07/31/21 1630 137/88 98.8 °F (37.1 °C) Oral 103 27 92 %   07/31/21 1413 -- -- -- 97 22 99 %   07/31/21 1121 128/78 99.2 °F (37.3 °C) Oral 103 19 (!) 87 %   07/31/21 0945 -- -- -- 104 19 96 %     T max 101 on 7/28 -  LGF since  abd tenderness worse, FRANSISCO still bloody and no stools or pus or smell  Feels ill again    MRI abd 7/31 shows panc tail pancreatitis, acute on chronic, w surrounding fluid collections. CT AP 7/30. No intest perforation and abscesses are larger and increased in number. CT AP show 7/27 left abd collection, and left pleura moderate size effusion - GS note appreciated    IR drainage: 7/28/21  · Pleural: PH   7.5, prot 3.6, LDH 2125, amylase 4100, WBC <3000, RBC 2125. cx pend neg    · abd fluid collection drain placed and fluid is serosang, , RBC 19139, amylase 404, ,  cx pend neg    Labs reviewed    Summary of relevant labs:  Labs:  W 19 - 17 - 19 - 17 - 20 - 25  Creat  0.68   - 269 - 291  Procalcitonin0.16   Lactic Acid 8.7    Micro:   BC 7/27    Imaging:  CT AP 7/30/21  Increasing multiloculated rim enhancing fluid collections in the left upper   quadrant. Slightly decreased size of the dominant pocket of fluid which now   contains a percutaneous drainage catheter, though there are innumerable new   smaller collections and other previously present smaller collections which   are increased in size.   I suspect many of these do not communicate with the   pocket of fluid containing the drainage catheter. Excellent opacification of the colon with enteric contrast without definitive   enteric contrast in the left upper quadrant collection to confirm a colonic   source. Pancreatic source is a possible alternative consideration in the   appropriate clinical setting. Persistent colonic wall thickening in the region of the hepatic flexure on a   background of probable ileus. Increased urinary bladder wall thickening with slightly increased perinephric   stranding. Correlate with urinary labs for contributory urinary tract   infection. CXR 7/27  Interval improvement in still mild interstitial edema with bilateral   asymmetric, left greater than right, pleural effusions     CT AP 7/27  Large complex fluid collection in the left upper quadrant, concerning for   abscess. This is likely related to the previously described colitis. 2. Consolidation and ground-glass opacity in both lower lobes with left   pleural effusion, concerning for multifocal pneumonia. 3. Diffuse urinary bladder wall thickening. This could be due to   underdistention, but correlation for any signs or symptoms of cystitis is   recommended. 4. Chronic pancreatitis. 5. Nonobstructing left nephrolithiasis. CT head 7/25  No acute intracranial abnormality. Suboccipital craniectomy. CT AP 7/21   Probable acute diverticulitis or colitis left colon. No evidence of perforation or abscess at this time. Chronic pancreatitis. Multiple compression fractures some of which are new since the previous exam.   Lingular ground-glass infiltrate. Recommend follow-up to resolution. Intra-atrial lipoma right atrium. Cardiac echo may be helpful. I have personally reviewed the past medical history, past surgical history, medications, social history, and family history, and I haveupdated the database accordingly. Allergies:   Patient has no known allergies.      Review of Systems:     Review of Systems Constitutional: Negative for activity change, appetite change, chills, diaphoresis and fatigue. HENT: Negative for congestion and rhinorrhea. Eyes: Negative for pain and discharge. Respiratory: Negative for apnea. Cardiovascular: Negative for chest pain and leg swelling. Gastrointestinal: Positive for abdominal pain. Negative for abdominal distention. Endocrine: Negative for heat intolerance and polyphagia. Genitourinary: Negative for dysuria and flank pain. Musculoskeletal: Negative for arthralgias. Skin: Positive for rash. Negative for color change. Allergic/Immunologic: Negative for immunocompromised state. Neurological: Positive for tremors. Negative for dizziness, seizures, speech difficulty and headaches. Hematological: Negative for adenopathy. Psychiatric/Behavioral: Negative for agitation. The patient is not nervous/anxious. Physical Examination :       Physical Exam  Constitutional:       General: She is not in acute distress. Appearance: Normal appearance. She is not ill-appearing, toxic-appearing or diaphoretic. HENT:      Head: Normocephalic and atraumatic. Nose: Nose normal.      Mouth/Throat:      Mouth: Mucous membranes are moist.   Eyes:      General: No scleral icterus. Pupils: Pupils are equal, round, and reactive to light. Cardiovascular:      Rate and Rhythm: Normal rate and regular rhythm. Heart sounds: Normal heart sounds. No murmur heard. No friction rub. No gallop. Pulmonary:      Effort: No respiratory distress. Breath sounds: Normal breath sounds. Abdominal:      General: There is no distension. Palpations: Abdomen is soft. Tenderness: There is abdominal tenderness. Right CVA tenderness: left lower quadrant. Genitourinary:     Comments: No helms  Musculoskeletal:         General: No swelling, deformity or signs of injury. Cervical back: Neck supple. No rigidity or tenderness.    Skin:     General: Skin is dry.      Coloration: Skin is not jaundiced or pale. Findings: No bruising or erythema. Neurological:      General: No focal deficit present. Mental Status: She is alert and oriented to person, place, and time. Cranial Nerves: No cranial nerve deficit. Sensory: No sensory deficit. Psychiatric:         Mood and Affect: Mood normal.         Thought Content:  Thought content normal.         Past Medical History:     Past Medical History:   Diagnosis Date    Acute respiratory failure with hypoxia ([address] 10/16/2020    Alcohol withdrawal syndrome, with delirium ([address] 12/14/2019    Alcoholism ([address]     Anemia 10/2020    GI bleed    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24, the patient underwent 2 surgical resections without known recurrence 10/23/2020    Closed fracture of lateral portion of left tibial plateau 68/14/0820    COPD (chronic obstructive pulmonary disease) [facility]     CO2 retainer, on Bipap at night for this, Dr. Jorge L Woo ( last visit 11/20/2020 and note on chart )    Depression     bipolar, major depressive disorder, ptsd, anxiety    Dysphagia     GI bleed 10/2020    Hypertension     Memory loss     Oxygen dependent     pt stated not needed as of 12/9/2020    Pain, joint, ankle and foot     Pancreatic lesion 10/2020    Dr. Abhinav Reed working up pt    Peripheral neuropathy     Seizures ([facility]     also baseline tremors-last sz summer 2020    Tension headache     Under care of team 07/01/2021    neuro-Dr Wan-[facility]-last visit june 2021    Under care of team 07/01/2021    pain management-Hamzah jimenez-last visit june 2021    Under care of team 07/01/2021    pulmonology-Dr Dueñas-[facility]-last virtual visit feb 2021    Under care of team 07/01/2021    psych-bahnfeldt NP-telemed-last visit may 2021    Under care of team 07/01/2021    cf-[address]-last visit june 2021    Wellness examination 07/01/2021    pcp-Ludivina Curtis avneisha-last visit may 2021       Past Surgical  History:     Past Surgical History:   Procedure Laterality Date    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    COLONOSCOPY N/A 10/22/2020    COLONOSCOPY DIAGNOSTIC performed by Rama Blackburn MD at [facility]    [address]  7/28/2021    CT ABSCESS DRAIN SUBCUTANEOUS 7/28/2021 [facility] CT SCAN    ENDOSCOPIC ULTRASOUND (LOWER) N/A 12/9/2020    ENDOSCOPIC ULTRASOUND, UPPER WITH LINEAR SCOPE FOR BIOPSY OF MASS ON HEAD OF PANCREAS performed by Oscar Herring MD at [address] Left 7-3-13    ORIF tibial plateau    FRACTURE SURGERY Right     small finger metacarpal fracture    HAND SURGERY      pins    HYSTERECTOMY  2003    UPPER GASTROINTESTINAL ENDOSCOPY N/A 10/22/2020    EGD BIOPSY performed by Rama Blackburn MD at [address] [place] [address] N/A 4/5/2021    EGD BIOPSY performed by Rama Blackburn MD at [facility]       Medications:      sodium chloride flush  5-40 mL Intravenous BID    pantoprazole  40 mg Oral QAM AC    guaiFENesin  600 mg Oral BID    nicotine  1 patch Transdermal Daily    ipratropium-albuterol  1 ampule Inhalation TID    lidocaine  1 patch Transdermal Daily    spironolactone  50 mg Oral Daily    polyethylene glycol  17 g Oral BID    carBAMazepine  200 mg Oral TID    topiramate  50 mg Oral BID    amLODIPine  5 mg Oral Daily    baclofen  10 mg Oral TID    budesonide-formoterol  2 puff Inhalation BID    busPIRone  15 mg Oral TID    ferrous sulfate  325 mg Oral BID    folic acid  1 mg Oral Daily    potassium chloride  20 mEq Oral Daily with breakfast    pregabalin  200 mg Oral TID    rOPINIRole  1 mg Oral Nightly    sodium chloride flush  5-40 mL Intravenous 2 times per day    enoxaparin  40 mg Subcutaneous Daily       Social History:     Social History     Socioeconomic History    Marital status: Single     Spouse name: Not on file    Number of children: Not on file  Years of education: Not on file    Highest education level: Not on file   Occupational History    Not on file   Tobacco Use    Smoking status: Current Every Day Smoker     Packs/day: 0.50     Years: 30.00     Pack years: 15.00     Types: Cigarettes    Smokeless tobacco: Never Used    Tobacco comment: plans on quitting in the future    Vaping Use    Vaping Use: Never used   Substance and Sexual Activity    Alcohol use: Not Currently     Alcohol/week: 0.0 standard drinks    Drug use: Yes     Frequency: 2.0 times per week     Types: Marijuana    Sexual activity: Not on file   Other Topics Concern    Not on file   Social History Narrative    Not on file     Social Determinants of Health     Financial Resource Strain: Medium Risk    Difficulty of Paying Living Expenses: Somewhat hard   Food Insecurity: No Food Insecurity    Worried About Running Out of Food in the Last Year: Never true    [place] of Food in the Last Year: Never true   Transportation Needs:     Lack of Transportation (Medical):      Lack of Transportation (Non-Medical):    Physical Activity:     Days of Exercise per Week:     Minutes of Exercise per Session:    Stress:     Feeling of Stress :    Social Connections:     Frequency of Communication with Friends and Family:     Frequency of Social Gatherings with Friends and Family:     Attends Yazidi Services:     Active Member of Clubs or Organizations:     Attends Club or Organization Meetings:     Marital Status:    Intimate Partner Violence:     Fear of Current or Ex-Partner:     Emotionally Abused:     Physically Abused:     Sexually Abused:        Family History:     Family History   Problem Relation Age of Onset    Other Father     Cancer Maternal Grandmother     Heart Disease Paternal Grandmother     Esophageal Cancer Maternal Aunt       Medical Decision Making:   I have independently reviewed/ordered the following labs:    CBC with Differential:   Recent Labs 07/30/21  0608 07/31/21  0831   WBC 25.2* 24.9*   HGB 9.4* 9.8*   HCT 28.8* 31.0*    467*   LYMPHOPCT 11* 10*   MONOPCT 9* 8     BMP:  Recent Labs     07/30/21  0608 07/30/21  0608 07/30/21  1850 07/30/21 2015 07/31/21  0831     --   --   --  139   K 3.3*   < >  --  3.9 4.2     --   --   --  100   CO2 21  --   --   --  24   BUN 7  --   --   --  5*   CREATININE 0.52  --   --   --  0.50   MG 1.5*   < > 1.9 1.9  --     < > = values in this interval not displayed. Hepatic Function Panel:   Recent Labs     07/31/21  0831   PROT 6.1*   LABALBU 2.6*   BILIDIR 0.31*   IBILI 0.16   BILITOT 0.47   ALKPHOS 440*   ALT 21   AST 33*     No results for input(s): RPR in the last 72 hours. No results for input(s): HIV in the last 72 hours. No results for input(s): BC in the last 72 hours. Lab Results   Component Value Date    CREATININE 0.50 07/31/2021    GLUCOSE 87 07/31/2021    GLUCOSE 92 04/30/2012       Detailed results: Thank you for allowing us to participate in the care of this patient. Please call with questions. This note is created with the assistance of a speech recognition program.  While intending to generate adocument that actually reflects the content of the visit, the document can still have some errors including those of syntax and sound a like substitutions which may escape proof reading. It such instances, actual meaningcan be extrapolated by contextual diversion.     Natalie Mccann MD  Office: [phone]  Perfect serve / office [phone]

## 2021-07-31 NOTE — PROGRESS NOTES
[facility]  Office: [address], DO, Wandy March, DO, Iftikhar Vanegas, DO, Israel Avila Blood, DO, Joshua Rodriguez MD, Josh Quiros MD, Ana Paula Nogueira MD, Toney Beltran MD, Hossein Salinas MD, Cem Sidhu MD, Joe Torres MD, Brenda Waite, DO, Bing Pena MD, Ramila Bowers, DO, Glen Villanueva MD,  Laxmi Bell, DO, Lindsay Key MD, Martha Rosado MD, Wilma Qureshi MD, Veronica Alexander MD, Elie Martin MD, Aliyah Aguilar MD, Emre Pyle MD, Kunal Truong, [facility], [facility], CNP, Ernie Oconnor, CNP, [facility], CNS, Tia Adkins, CNP, Marzena Flores, CNP, Susie Schroeder, CNP, Nadiya Lai, CNP, Connor Mixon, CNP, Fabricio Easley, PA-C, Benny Hernandez, [facility], Daiana Solis, CNP, Claudette Anis, CNP, Prashant Santillan, CNP, Franklyn Gold, CNP, Brijesh Bernardo, [facility], Zana Tavares, CNP, Audrey Gipson, [facility], Radha Jiang, [address] [place] [address]    Progress Note    7/31/2021    8:31 AM    Name:   Luis Gentile  MRN:     [id number]     Acct:      [de-identified]   Room:   2022/2022-01  IP Day:  8  Admit Date:  7/21/2021  3:06 PM    PCP:   Charlie Sawyer MD  Code Status:  Full Code    Subjective:     C/C:   Chief Complaint   Patient presents with    Back Pain     states chronic back pain becoming worse.  has recently started physical therapy     Interval History Status: not changed. Patient seen and examined. Having some abdominal pain but improving, Serosanguinous fluid no longer viscus. BP stable, no fecers slightly tachycardic. White count still elevated. Brief History:     69-year-old female past medical history of hypertension, smoker, seizure disorder, COPD, generalized anxiety disorder, history of alcohol abuse, history of marijuana abuse, presented urgently on 7/21/2021 due to back pain with concern of urinary tract infection causing sepsis.   Patient found to have compression fractures and was evaluated by neurosurgery, pain control and no acute intervention planned. Evaluated by neurology for concern of worsening seizures, topomax and tegretol were adjusted. Pulmonology has previously evaluated patient when having worsening respiratory distress, has remained stable on oxygen at this time. Patient was also treated for COPD exacerbation, and was monitored for chronic pancreatitis history patient was originally treated for colitis however continued to worsen and was evaluated by general surgery found to have colonic abscess with FRANSISCO drain 7/28/2021 and thoracentesis on the left side with 400 mL removed. Patient underwent second thoracenteis with 450 mL removed 7/30/2021. Patient also having evolving pancreatitis and being re-evaluated by GI. MRCP planned 7/31/2021. General surgery has also been following for colonic abscess and concern for possible need of surgical intervention. Review of Systems:     Review of Systems   Constitutional: Positive for fatigue. Negative for activity change and chills. HENT: Negative for congestion and trouble swallowing. Eyes: Negative for discharge and visual disturbance. Respiratory: Positive for cough. Negative for apnea and shortness of breath. Cardiovascular: Negative for chest pain and palpitations. Gastrointestinal: Positive for abdominal pain. Negative for abdominal distention, nausea and vomiting. Endocrine: Negative for cold intolerance and polyphagia. Genitourinary: Negative for difficulty urinating and dysuria. Musculoskeletal: Positive for arthralgias. Negative for joint swelling and neck stiffness. Skin: Negative for color change and rash. Neurological: Negative for dizziness and light-headedness. Hematological: Negative for adenopathy. Does not bruise/bleed easily. Psychiatric/Behavioral: Negative for confusion and decreased concentration. Medications:      Allergies:  No Known Allergies    Current Meds:   Scheduled Meds:    pantoprazole  40 mg Oral QAM AC    guaiFENesin  600 mg Oral BID    nicotine  1 patch Transdermal Daily    ipratropium-albuterol  1 ampule Inhalation TID    lidocaine  1 patch Transdermal Daily    spironolactone  50 mg Oral Daily    polyethylene glycol  17 g Oral BID    carBAMazepine  200 mg Oral TID    topiramate  50 mg Oral BID    amLODIPine  5 mg Oral Daily    baclofen  10 mg Oral TID    budesonide-formoterol  2 puff Inhalation BID    busPIRone  15 mg Oral TID    ferrous sulfate  325 mg Oral BID    folic acid  1 mg Oral Daily    potassium chloride  20 mEq Oral Daily with breakfast    pregabalin  200 mg Oral TID    rOPINIRole  1 mg Oral Nightly    sodium chloride flush  5-40 mL Intravenous 2 times per day    enoxaparin  40 mg Subcutaneous Daily    piperacillin-tazobactam  3,375 mg Intravenous Q8H     Continuous Infusions:    lactated ringers 100 mL/hr at 07/30/21 1738    sodium chloride 25 mL (07/24/21 1803)     PRN Meds: magnesium sulfate, fentanNYL **OR** fentanNYL, potassium chloride **OR** potassium alternative oral replacement **OR** potassium chloride, LORazepam, hyoscyamine, hydrOXYzine, bisacodyl, albuterol, traZODone, sodium chloride flush, sodium chloride, acetaminophen **OR** acetaminophen, [Held by provider] oxyCODONE-acetaminophen **OR** [DISCONTINUED] oxyCODONE-acetaminophen, melatonin    Data:     Past Medical History:   has a past medical history of Acute respiratory failure with hypoxia ([facility] [address] Alcohol withdrawal syndrome, with delirium ([facility] [address] Alcoholism ([facility] [address] Anemia, Astrocytoma ([facility] [address] - 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24, the patient underwent 2 surgical resections without known recurrence, Closed fracture of lateral portion of left tibial plateau, COPD (chronic obstructive pulmonary disease) ([address] Depression, Dysphagia, GI bleed, Hypertension, Memory loss, Oxygen dependent, Pain, joint, ankle and foot, Pancreatic lesion, Peripheral neuropathy, Seizures ([address] Tension headache, Under care of team, Under care of team, Under care of team, Under care of team, Under care of team, and Wellness examination. Social History:   reports that she has been smoking cigarettes. She has a 15.00 pack-year smoking history. She has never used smokeless tobacco. She reports previous alcohol use. She reports current drug use. Frequency: 2.00 times per week. Drug: Marijuana. Family History:   Family History   Problem Relation Age of Onset    Other Father     Cancer Maternal Grandmother     Heart Disease Paternal Grandmother     Esophageal Cancer Maternal Aunt        Vitals:  BP (!) 151/102   Pulse 88   Temp 98.8 °F (37.1 °C) (Oral)   Resp 18   Ht 5' 5\" (1.651 m)   Wt 142 lb 9.6 oz (64.7 kg)   SpO2 97%   BMI 23.73 kg/m²   Temp (24hrs), Av.7 °F (37.1 °C), Min:98.1 °F (36.7 °C), Max:99.1 °F (37.3 °C)    No results for input(s): POCGLU in the last 72 hours. I/O (24Hr):     Intake/Output Summary (Last 24 hours) at 2021 0831  Last data filed at 2021 0700  Gross per 24 hour   Intake 2531 ml   Output 2245 ml   Net 286 ml       Labs:  Hematology:  Recent Labs     21  1706 21  0320 21  0608   WBC  --  20.8* 25.2*   RBC  --  3.28* 3.14*   HGB 9.8* 9.5* 9.4*   HCT 31.2* 30.5* 28.8*   MCV  --  93.0 91.7   MCH  --  29.0 29.9   MCHC  --  31.1 32.6   RDW  --  13.3 13.2   PLT  --  377 412   MPV  --  9.9 9.8     Chemistry:  Recent Labs     21  0320 21  0437 21  0437 21  1030 21  0608 21  1004 21  1850 21     --   --   --  137  --   --   --    K 3.7  --   --   --  3.3*  --   --  3.9     --   --   --  100  --   --   --    CO2 23  --   --   --  21  --   --   --    GLUCOSE 89  --   --   --  78  --   --   --    BUN 9  --   --   --  7  --   --   --    CREATININE 0.53  --   --   --  0.52  --   --   --    MG  --  1.4*   < >  --  1.5*  --  1.9 1.9   ANIONGAP 11  --   --   --  16  --   --   --    LABGLOM >60  --   --   --  >60  -- --   --    GFRAA >60  --   --   --  >60  --   --   --    CALCIUM 8.5*  --   --   --  8.4*  --   --   --    TROPHS  --  9  --   --   --   --   --   --    LACTACIDWB  --   --   --  1.3  --  0.6*  --   --     < > = values in this interval not displayed. No results for input(s): PROT, LABALBU, LABA1C, T6DUCDA, F7ZSAYS, FT4, TSH, AST, ALT, LDH, GGT, ALKPHOS, LABGGT, BILITOT, BILIDIR, AMMONIA, AMYLASE, LIPASE, LACTATE, CHOL, HDL, LDLCHOLESTEROL, CHOLHDLRATIO, TRIG, VLDL, JFZ09YY, PHENYTOIN, PHENYF, URICACID, POCGLU in the last 72 hours. ABG:  Lab Results   Component Value Date    POCPH 7.467 07/26/2021    POCPCO2 44.7 07/26/2021    POCPO2 71.9 07/26/2021    POCHCO3 32.3 07/26/2021    NBEA NOT REPORTED 07/26/2021    PBEA 8 07/26/2021    ZAG1DGG NOT REPORTED 07/26/2021    LYZA4DYW 95 07/26/2021    FIO2 INFORMATION NOT PROVIDED 07/28/2021     Lab Results   Component Value Date/Time    SPECIAL NOT REPORTED 07/30/2021 05:20 PM     Lab Results   Component Value Date/Time    CULTURE NO GROWTH 12 HOURS 07/30/2021 05:20 PM       Radiology:  XR CHEST (SINGLE VIEW FRONTAL)    Result Date: 7/29/2021  Pulmonary congestion with bibasilar effusions and adjacent infiltrates. XR CHEST (SINGLE VIEW FRONTAL)    Result Date: 7/27/2021  Interval improvement in still mild interstitial edema with bilateral asymmetric, left greater than right, pleural effusions     XR ACUTE ABD SERIES CHEST 1 VW    Result Date: 7/24/2021  Bilateral airspace disease and pleural effusions. Findings in the upper lobes appear increased compared to prior, left greater than right. Findings may be related to asymmetric edema with superimposed pneumonia not excluded. Gas and stool are seen throughout nondilated bowel loops with few nonspecific air-fluid levels in the right lower abdomen, likely in small bowel. Findings may be physiologic or related to mild ileus. No evidence of obstruction or free air.      CT HEAD WO CONTRAST    Result Date: 7/25/2021  No acute intracranial abnormality. Suboccipital craniectomy. CT ABDOMEN PELVIS W IV CONTRAST Additional Contrast? Oral and Rectal    Result Date: 7/30/2021  Increasing multiloculated rim enhancing fluid collections in the left upper quadrant. Slightly decreased size of the dominant pocket of fluid which now contains a percutaneous drainage catheter, though there are innumerable new smaller collections and other previously present smaller collections which are increased in size. I suspect many of these do not communicate with the pocket of fluid containing the drainage catheter. Excellent opacification of the colon with enteric contrast without definitive enteric contrast in the left upper quadrant collection to confirm a colonic source. Pancreatic source is a possible alternative consideration in the appropriate clinical setting. Persistent colonic wall thickening in the region of the hepatic flexure on a background of probable ileus. Increased urinary bladder wall thickening with slightly increased perinephric stranding. Correlate with urinary labs for contributory urinary tract infection. CT ABDOMEN PELVIS W IV CONTRAST Additional Contrast? Radiologist Recommendation    Result Date: 7/27/2021  1. Large complex fluid collection in the left upper quadrant, concerning for abscess. This is likely related to the previously described colitis. 2. Consolidation and ground-glass opacity in both lower lobes with left pleural effusion, concerning for multifocal pneumonia. 3. Diffuse urinary bladder wall thickening. This could be due to underdistention, but correlation for any signs or symptoms of cystitis is recommended. 4. Chronic pancreatitis. 5. Nonobstructing left nephrolithiasis. XR CHEST PORTABLE    Result Date: 7/25/2021  1. Increasing vascular congestion and left perihilar opacity. 2.  Pleural effusions and basilar opacities are otherwise without significant change.      CT ABSCESS DRAINAGE    Result Date: 7/28/2021  Successful percutaneous ultrasound and CT guided placement of 8 Kis[place]ili left upper quadrant drainage catheter. New high attenuation material within the multi loculated left upper quadrant fluid collection suggests leakage from the adjacent abnormal colonic segment indicating micro perforation. The above findings were called by Dr. Maude Sampson MD to Dr. Kary Lindsay On 7/28/2021 at 12:32. IR GUIDED THORACENTESIS PLEURAL    Result Date: 7/28/2021  Successful ultrasound guided thoracentesis. Physical Examination:        Physical Exam  Constitutional:       Comments: Chronically ill appearing, nasal cannula   HENT:      Head: Normocephalic and atraumatic. Right Ear: External ear normal.      Left Ear: External ear normal.      Nose:      Comments: Nasal cannula     Mouth/Throat:      Mouth: Mucous membranes are moist.   Eyes:      Extraocular Movements: Extraocular movements intact. Pupils: Pupils are equal, round, and reactive to light. Cardiovascular:      Rate and Rhythm: Normal rate and regular rhythm. Pulses: Normal pulses. Heart sounds: Normal heart sounds. Pulmonary:      Effort: Prolonged expiration present. No respiratory distress. Breath sounds: No stridor or transmitted upper airway sounds. Decreased breath sounds present. No wheezing or rhonchi. Chest:      Chest wall: There is no dullness to percussion. Abdominal:      General: Bowel sounds are normal. There is no distension. Palpations: Abdomen is soft. There is no hepatomegaly. Tenderness: There is generalized abdominal tenderness and tenderness in the left upper quadrant. There is no right CVA tenderness or left CVA tenderness. Musculoskeletal:         General: No swelling. Cervical back: Neck supple. No rigidity. Right lower leg: No edema. Left lower leg: No edema. Skin:     General: Skin is warm and dry.       Capillary Refill: Capillary refill takes less than 2 seconds. Neurological:      Mental Status: She is alert and oriented to person, place, and time. Psychiatric:         Attention and Perception: Attention normal. She does not perceive auditory hallucinations. Mood and Affect: Mood normal. Affect is labile. Speech: Speech normal.         Behavior: Behavior is cooperative. Thought Content: Thought content normal.         Cognition and Memory: Memory is impaired.          Judgment: Judgment normal.           Assessment:        Hospital Problems         Last Modified POA    * (Principal) Severe sepsis ([address] 7/28/2021 Yes    Essential hypertension 7/21/2021 Yes    Tobacco use 7/21/2021 Yes    Seizure disorder ([address] 7/25/2021 Yes    COPD with acute exacerbation ([address] 7/22/2021 Yes    Acute respiratory failure with hypoxia ([address] 7/22/2021 Yes    Recurrent major depressive disorder ([address] 7/21/2021 Yes    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24, the patient underwent 2 surgical resections without known recurrence 7/00/8839 Yes    Metabolic encephalopathy 1/77/0548 Yes    AMAN (generalized anxiety disorder) 7/21/2021 Yes    Chronic peripheral neuropathic pain 7/21/2021 Yes    History of alcohol abuse 7/21/2021 Yes    Personal history of astrocytoma 7/21/2021 Yes    Marijuana abuse 7/21/2021 Yes    Closed fracture of fifth thoracic vertebra ([address] 7/22/2021 Yes    Diverticulitis of large intestine with abscess without bleeding 7/28/2021 Yes    Elevated brain natriuretic peptide (BNP) level 7/22/2021 Yes    Elevated alkaline phosphatase level 7/22/2021 Yes    Chronic pancreatitis ([address] 7/22/2021 Yes    Erythema ab igne 7/22/2021 Yes    Compression fracture of thoracic vertebra ([address] 7/23/2021 Yes    Compression of lumbar vertebra ([address] 2/42/4144 Yes    Metabolic acidosis with increased anion gap and accumulation of organic acids 7/28/2021 Yes    Parapneumonic effusion 7/29/2021 Yes    Septicemia ([address] 7/29/2021 Yes          Plan:        Colonic abscess secondary to diverticulitis requiring FRANSISCO drain. Appreciate General surgery and ID recommendations. Zosyn  Acute on chronic pancreatitis with fluid collection in LUQ as well as pancreatic fluid on thoracentesis. MRCP pending. Appreciate GI recs. Monitor Levido reticularis on back  Smoker, completed treatment for COPD exacerbation. Appreciate Pulm recs. Bipap as needed, wean down Oxygen as tolerated, Incentive spirometry. Encourage smoking cessation. Mucinex. Status post left thoracentesis on 7/28/2021 and 7/30/2021  HTN. Norvasc, adlactone  Seizure history of petit mal seizures. Appreciate neurology recs. Tregretol 200 TID  Migraines. Topamax 50 mg BID  Depression. buspar  Hypokalemia, replace with potassium  Restless leg syndrome. ropinerol  Neuropathy.  Lyrica  Protonix GI prophylaxis  PT/OT  Will need to clarify nutrition with other services, if MRCP negative will see if possibly able to tolerate PO otherwise may need TPN    Murlean Gowers, MD  7/31/2021  8:31 AM

## 2021-08-01 LAB
ABSOLUTE EOS #: 0.09 K/UL (ref 0–0.44)
ABSOLUTE IMMATURE GRANULOCYTE: 0.49 K/UL (ref 0–0.3)
ABSOLUTE LYMPH #: 1.95 K/UL (ref 1.1–3.7)
ABSOLUTE MONO #: 1.5 K/UL (ref 0.1–1.2)
ALBUMIN SERPL-MCNC: 2.3 G/DL (ref 3.5–5.2)
ALBUMIN/GLOBULIN RATIO: 0.7 (ref 1–2.5)
ALP BLD-CCNC: 358 U/L (ref 35–104)
ALT SERPL-CCNC: 15 U/L (ref 5–33)
ANION GAP SERPL CALCULATED.3IONS-SCNC: 15 MMOL/L (ref 9–17)
AST SERPL-CCNC: 20 U/L
BASOPHILS # BLD: 0 % (ref 0–2)
BASOPHILS ABSOLUTE: 0.05 K/UL (ref 0–0.2)
BILIRUB SERPL-MCNC: 0.35 MG/DL (ref 0.3–1.2)
BILIRUBIN DIRECT: 0.22 MG/DL
BILIRUBIN, INDIRECT: 0.13 MG/DL (ref 0–1)
BUN BLDV-MCNC: 4 MG/DL (ref 6–20)
BUN/CREAT BLD: ABNORMAL (ref 9–20)
CALCIUM SERPL-MCNC: 8.7 MG/DL (ref 8.6–10.4)
CHLORIDE BLD-SCNC: 100 MMOL/L (ref 98–107)
CO2: 24 MMOL/L (ref 20–31)
CREAT SERPL-MCNC: 0.36 MG/DL (ref 0.5–0.9)
DIFFERENTIAL TYPE: ABNORMAL
EOSINOPHILS RELATIVE PERCENT: 1 % (ref 1–4)
GFR AFRICAN AMERICAN: >60 ML/MIN
GFR NON-AFRICAN AMERICAN: >60 ML/MIN
GFR SERPL CREATININE-BSD FRML MDRD: ABNORMAL ML/MIN/{1.73_M2}
GFR SERPL CREATININE-BSD FRML MDRD: ABNORMAL ML/MIN/{1.73_M2}
GLOBULIN: ABNORMAL G/DL (ref 1.5–3.8)
GLUCOSE BLD-MCNC: 77 MG/DL (ref 70–99)
HCT VFR BLD CALC: 27.8 % (ref 36.3–47.1)
HEMOGLOBIN: 9 G/DL (ref 11.9–15.1)
IMMATURE GRANULOCYTES: 3 %
LYMPHOCYTES # BLD: 11 % (ref 24–43)
MCH RBC QN AUTO: 29.7 PG (ref 25.2–33.5)
MCHC RBC AUTO-ENTMCNC: 32.4 G/DL (ref 28.4–34.8)
MCV RBC AUTO: 91.7 FL (ref 82.6–102.9)
MONOCYTES # BLD: 8 % (ref 3–12)
NRBC AUTOMATED: 0 PER 100 WBC
PDW BLD-RTO: 13.1 % (ref 11.8–14.4)
PLATELET # BLD: 474 K/UL (ref 138–453)
PLATELET ESTIMATE: ABNORMAL
PMV BLD AUTO: 9.9 FL (ref 8.1–13.5)
POTASSIUM SERPL-SCNC: 3.4 MMOL/L (ref 3.7–5.3)
RBC # BLD: 3.03 M/UL (ref 3.95–5.11)
RBC # BLD: ABNORMAL 10*6/UL
SEG NEUTROPHILS: 77 % (ref 36–65)
SEGMENTED NEUTROPHILS ABSOLUTE COUNT: 14.04 K/UL (ref 1.5–8.1)
SODIUM BLD-SCNC: 139 MMOL/L (ref 135–144)
TOTAL PROTEIN: 5.5 G/DL (ref 6.4–8.3)
WBC # BLD: 18.1 K/UL (ref 3.5–11.3)
WBC # BLD: ABNORMAL 10*3/UL

## 2021-08-01 PROCEDURE — 80048 BASIC METABOLIC PNL TOTAL CA: CPT

## 2021-08-01 PROCEDURE — 99232 SBSQ HOSP IP/OBS MODERATE 35: CPT | Performed by: INTERNAL MEDICINE

## 2021-08-01 PROCEDURE — 36415 COLL VENOUS BLD VENIPUNCTURE: CPT

## 2021-08-01 PROCEDURE — 97530 THERAPEUTIC ACTIVITIES: CPT

## 2021-08-01 PROCEDURE — 97116 GAIT TRAINING THERAPY: CPT

## 2021-08-01 PROCEDURE — 6360000002 HC RX W HCPCS: Performed by: NURSE PRACTITIONER

## 2021-08-01 PROCEDURE — 6370000000 HC RX 637 (ALT 250 FOR IP): Performed by: INTERNAL MEDICINE

## 2021-08-01 PROCEDURE — 94761 N-INVAS EAR/PLS OXIMETRY MLT: CPT

## 2021-08-01 PROCEDURE — 6370000000 HC RX 637 (ALT 250 FOR IP): Performed by: NURSE PRACTITIONER

## 2021-08-01 PROCEDURE — 6360000002 HC RX W HCPCS: Performed by: STUDENT IN AN ORGANIZED HEALTH CARE EDUCATION/TRAINING PROGRAM

## 2021-08-01 PROCEDURE — 85025 COMPLETE CBC W/AUTO DIFF WBC: CPT

## 2021-08-01 PROCEDURE — 2580000003 HC RX 258: Performed by: NURSE PRACTITIONER

## 2021-08-01 PROCEDURE — 80076 HEPATIC FUNCTION PANEL: CPT

## 2021-08-01 PROCEDURE — 99232 SBSQ HOSP IP/OBS MODERATE 35: CPT | Performed by: STUDENT IN AN ORGANIZED HEALTH CARE EDUCATION/TRAINING PROGRAM

## 2021-08-01 PROCEDURE — 94640 AIRWAY INHALATION TREATMENT: CPT

## 2021-08-01 PROCEDURE — 6370000000 HC RX 637 (ALT 250 FOR IP): Performed by: STUDENT IN AN ORGANIZED HEALTH CARE EDUCATION/TRAINING PROGRAM

## 2021-08-01 PROCEDURE — 6370000000 HC RX 637 (ALT 250 FOR IP): Performed by: FAMILY MEDICINE

## 2021-08-01 PROCEDURE — 2580000003 HC RX 258: Performed by: STUDENT IN AN ORGANIZED HEALTH CARE EDUCATION/TRAINING PROGRAM

## 2021-08-01 PROCEDURE — 2700000000 HC OXYGEN THERAPY PER DAY

## 2021-08-01 PROCEDURE — 2060000000 HC ICU INTERMEDIATE R&B

## 2021-08-01 PROCEDURE — 6370000000 HC RX 637 (ALT 250 FOR IP): Performed by: PHYSICIAN ASSISTANT

## 2021-08-01 RX ORDER — IPRATROPIUM BROMIDE AND ALBUTEROL SULFATE 2.5; .5 MG/3ML; MG/3ML
1 SOLUTION RESPIRATORY (INHALATION) EVERY 6 HOURS PRN
Status: DISCONTINUED | OUTPATIENT
Start: 2021-08-01 | End: 2021-08-01

## 2021-08-01 RX ADMIN — LORAZEPAM 1 MG: 2 INJECTION INTRAMUSCULAR; INTRAVENOUS at 16:14

## 2021-08-01 RX ADMIN — FENTANYL CITRATE 50 MCG: 50 INJECTION, SOLUTION INTRAMUSCULAR; INTRAVENOUS at 08:43

## 2021-08-01 RX ADMIN — FENTANYL CITRATE 50 MCG: 50 INJECTION, SOLUTION INTRAMUSCULAR; INTRAVENOUS at 22:46

## 2021-08-01 RX ADMIN — BACLOFEN 10 MG: 10 TABLET ORAL at 22:36

## 2021-08-01 RX ADMIN — GUAIFENESIN 600 MG: 600 TABLET, EXTENDED RELEASE ORAL at 08:41

## 2021-08-01 RX ADMIN — PREGABALIN 200 MG: 100 CAPSULE ORAL at 14:19

## 2021-08-01 RX ADMIN — SPIRONOLACTONE 50 MG: 25 TABLET ORAL at 08:41

## 2021-08-01 RX ADMIN — SODIUM CHLORIDE, POTASSIUM CHLORIDE, SODIUM LACTATE AND CALCIUM CHLORIDE: 600; 310; 30; 20 INJECTION, SOLUTION INTRAVENOUS at 09:27

## 2021-08-01 RX ADMIN — PANTOPRAZOLE SODIUM 40 MG: 40 TABLET, DELAYED RELEASE ORAL at 06:42

## 2021-08-01 RX ADMIN — TIOTROPIUM BROMIDE INHALATION SPRAY 2 PUFF: 3.12 SPRAY, METERED RESPIRATORY (INHALATION) at 16:45

## 2021-08-01 RX ADMIN — FOLIC ACID 1 MG: 1 TABLET ORAL at 08:42

## 2021-08-01 RX ADMIN — BUSPIRONE HYDROCHLORIDE 15 MG: 15 TABLET ORAL at 14:18

## 2021-08-01 RX ADMIN — FERROUS SULFATE TAB EC 325 MG (65 MG FE EQUIVALENT) 325 MG: 325 (65 FE) TABLET DELAYED RESPONSE at 22:36

## 2021-08-01 RX ADMIN — PREGABALIN 200 MG: 100 CAPSULE ORAL at 08:42

## 2021-08-01 RX ADMIN — BUDESONIDE AND FORMOTEROL FUMARATE DIHYDRATE 2 PUFF: 160; 4.5 AEROSOL RESPIRATORY (INHALATION) at 07:59

## 2021-08-01 RX ADMIN — CARBAMAZEPINE 200 MG: 200 TABLET ORAL at 08:42

## 2021-08-01 RX ADMIN — GUAIFENESIN 600 MG: 600 TABLET, EXTENDED RELEASE ORAL at 22:36

## 2021-08-01 RX ADMIN — FENTANYL CITRATE 50 MCG: 50 INJECTION, SOLUTION INTRAMUSCULAR; INTRAVENOUS at 16:13

## 2021-08-01 RX ADMIN — BUDESONIDE AND FORMOTEROL FUMARATE DIHYDRATE 2 PUFF: 160; 4.5 AEROSOL RESPIRATORY (INHALATION) at 20:46

## 2021-08-01 RX ADMIN — ENOXAPARIN SODIUM 40 MG: 40 INJECTION SUBCUTANEOUS at 08:42

## 2021-08-01 RX ADMIN — FENTANYL CITRATE 50 MCG: 50 INJECTION, SOLUTION INTRAMUSCULAR; INTRAVENOUS at 11:18

## 2021-08-01 RX ADMIN — BACLOFEN 10 MG: 10 TABLET ORAL at 14:18

## 2021-08-01 RX ADMIN — POTASSIUM CHLORIDE 20 MEQ: 1500 TABLET, EXTENDED RELEASE ORAL at 08:41

## 2021-08-01 RX ADMIN — FERROUS SULFATE TAB EC 325 MG (65 MG FE EQUIVALENT) 325 MG: 325 (65 FE) TABLET DELAYED RESPONSE at 08:42

## 2021-08-01 RX ADMIN — TRAZODONE HYDROCHLORIDE 50 MG: 50 TABLET ORAL at 22:36

## 2021-08-01 RX ADMIN — BUSPIRONE HYDROCHLORIDE 15 MG: 15 TABLET ORAL at 08:42

## 2021-08-01 RX ADMIN — IPRATROPIUM BROMIDE AND ALBUTEROL SULFATE 1 AMPULE: .5; 3 SOLUTION RESPIRATORY (INHALATION) at 07:58

## 2021-08-01 RX ADMIN — BACLOFEN 10 MG: 10 TABLET ORAL at 08:42

## 2021-08-01 RX ADMIN — BUSPIRONE HYDROCHLORIDE 15 MG: 15 TABLET ORAL at 22:36

## 2021-08-01 RX ADMIN — ROPINIROLE HYDROCHLORIDE 1 MG: 1 TABLET, FILM COATED ORAL at 22:36

## 2021-08-01 RX ADMIN — CARBAMAZEPINE 200 MG: 200 TABLET ORAL at 22:36

## 2021-08-01 RX ADMIN — PREGABALIN 200 MG: 100 CAPSULE ORAL at 22:36

## 2021-08-01 RX ADMIN — SODIUM CHLORIDE, PRESERVATIVE FREE 10 ML: 5 INJECTION INTRAVENOUS at 22:35

## 2021-08-01 RX ADMIN — TOPIRAMATE 50 MG: 25 TABLET, FILM COATED ORAL at 08:44

## 2021-08-01 RX ADMIN — TOPIRAMATE 50 MG: 25 TABLET, FILM COATED ORAL at 22:36

## 2021-08-01 RX ADMIN — SODIUM CHLORIDE, POTASSIUM CHLORIDE, SODIUM LACTATE AND CALCIUM CHLORIDE: 600; 310; 30; 20 INJECTION, SOLUTION INTRAVENOUS at 22:49

## 2021-08-01 RX ADMIN — CARBAMAZEPINE 200 MG: 200 TABLET ORAL at 14:18

## 2021-08-01 RX ADMIN — AMLODIPINE BESYLATE 5 MG: 5 TABLET ORAL at 08:42

## 2021-08-01 RX ADMIN — FENTANYL CITRATE 50 MCG: 50 INJECTION, SOLUTION INTRAMUSCULAR; INTRAVENOUS at 05:15

## 2021-08-01 RX ADMIN — POTASSIUM CHLORIDE 40 MEQ: 1500 TABLET, EXTENDED RELEASE ORAL at 08:49

## 2021-08-01 RX ADMIN — LORAZEPAM 1 MG: 2 INJECTION INTRAMUSCULAR; INTRAVENOUS at 08:43

## 2021-08-01 ASSESSMENT — PAIN SCALES - GENERAL
PAINLEVEL_OUTOF10: 7
PAINLEVEL_OUTOF10: 4
PAINLEVEL_OUTOF10: 8
PAINLEVEL_OUTOF10: 7
PAINLEVEL_OUTOF10: 4
PAINLEVEL_OUTOF10: 8

## 2021-08-01 ASSESSMENT — ENCOUNTER SYMPTOMS
ABDOMINAL DISTENTION: 0
DIARRHEA: 0
COLOR CHANGE: 0
ABDOMINAL PAIN: 1
APNEA: 0
SHORTNESS OF BREATH: 1
SHORTNESS OF BREATH: 0
WHEEZING: 0
TROUBLE SWALLOWING: 0
PHOTOPHOBIA: 0
COUGH: 0
NAUSEA: 0
EYE DISCHARGE: 0
ABDOMINAL PAIN: 0
RHINORRHEA: 0
VOMITING: 0
EYE PAIN: 0
COUGH: 1

## 2021-08-01 NOTE — PROGRESS NOTES
[facility]   Re-consultNote    Meg Cancino is a 45 y.o. female patient. Hospitalization Day:11      Chief consult reason:    Increasing abdominal pain  ? Diverticulitis/colitis, leukocytosis   re-consulted for pancreatic fluid collection    Subjective:  Patient seen and examined. Patient states abdominal pain is under control today. She has a strong appetite and would like to try clears  FRANSISCO drain with bloody sanguinous drainage. MRI/MRCP completed yesterday indicated interstitial edematous pancreatitis in the pancreatic tail consistent with acute on chronic pancreatitis  Additionally, 13.8 cm x 6.5 cm x 2.3 cm fluid collection in left subphrenic space with suspicion for continuity of the lesion with the main pancreatic duct in the tail, mild ductal dilatation with abrupt caliber changes in the pancreatic neck-see report for details    VITALS:  /83   Pulse 102   Temp 98.6 °F (37 °C) (Oral)   Resp 21   Ht 5' 5\" (1.651 m)   Wt 142 lb 9.6 oz (64.7 kg)   SpO2 95%   BMI 23.73 kg/m²   TEMPERATURE:  Current - Temp: 98.6 °F (37 °C);  Max - Temp  Av.7 °F (37.1 °C)  Min: 98.4 °F (36.9 °C)  Max: 98.8 °F (37.1 °C)    Physical Assessment:  General appearance:  alert, cooperative and moderate pain distress  Mental Status:  oriented to person, place and time and normal affect  Lungs:  clear to auscultation bilaterally, normal effort  Heart:  regular rate and rhythm, no murmur  Abdomen:  soft, slightly tender, nondistended, normal bowel sounds, no masses, hepatomegaly, splenomegaly  Extremities:  no edema, redness, tenderness in the calves  Skin: Rash posterior back    Data Review:    Labs and Imaging:     CBC:  Recent Labs     21  0608 21  0831 21  0638   WBC 25.2* 24.9* 18.1*   HGB 9.4* 9.8* 9.0*   MCV 91.7 92.3 91.7   RDW 13.2 13.1 13.1    467* 474*       ANEMIA STUDIES:  No results for input(s): LABIRON, TIBC, FERRITIN, NYCARBQK56, FOLATE, OCCULTBLD in the last 72 hours. BMP:  Recent Labs     07/30/21  0608 07/30/21  0608 07/30/21 2015 07/31/21  0831 08/01/21  0638     --   --  139 139   K 3.3*   < > 3.9 4.2 3.4*     --   --  100 100   CO2 21  --   --  24 24   BUN 7  --   --  5* 4*   CREATININE 0.52  --   --  0.50 0.36*   GLUCOSE 78  --   --  87 77   CALCIUM 8.4*  --   --  8.9 8.7    < > = values in this interval not displayed. LFTS:  Recent Labs     07/31/21 0831 08/01/21  0638   ALKPHOS 440* 358*   ALT 21 15   AST 33* 20   BILITOT 0.47 0.35   BILIDIR 0.31* 0.22   LABALBU 2.6* 2.3*       Amylase/Lipase and Ammonia:  Recent Labs     07/31/21 0831   AMYLASE 35   LIPASE 80*       Acute Hepatitis Panel:  Lab Results   Component Value Date    HEPBSAG NONREACTIVE 07/14/2019    HEPCAB NONREACTIVE 07/14/2019    HEPBIGM NONREACTIVE 07/14/2019    HEPAIGM NONREACTIVE 07/14/2019       HCV Genotype:  No results found for: HEPATITISCGENOTYPE    HCV Quantitative:  No results found for: HCVQNT    LIVER WORK UP:    AFP  Lab Results   Component Value Date    AFP 7.3 07/21/2021       Alpha 1 antitrypsin   No results found for: A1A    BRADLEY  Lab Results   Component Value Date    BRADLEY NEGATIVE 04/13/2021       AMA  No results found for: MITOAB    ASMA  No results found for: SMOOTHMUSCAB    PT/INR  No results for input(s): PROTIME, INR in the last 72 hours. Cancer Markers:  CEA:  No results for input(s): CEA in the last 72 hours. Ca 125:  No results for input(s):  in the last 72 hours. Ca 19-9:   Invalid input(s):   AFP: No results for input(s): AFP in the last 72 hours. Lactic acid:Invalid input(s): LACTIC ACID    Radiology Review:      7/31/2021 MRI MRCP abdomen and pelvis  FINDINGS:   LOWER CHEST:  Trace to small right and small left pleural effusions with   associated partial to complete collapse of the bilateral lower lobes.    Suspected air bronchograms in the left lower lobe.  Mild cardiomegaly.  No   pericardial effusion.       LIVER:  Slightly increased signal on opposed-phase images and decreased   signal on T2-weighted images, suggesting iron deposition.  No findings of   cirrhosis.       GALLBLADDER/BILE DUCTS:  Normal gallbladder.  Mild intrahepatic and   extrahepatic biliary dilation with normal tapering of distal common bile   duct.  No obvious ductal filling defects nor strictures.       PANCREAS:  Typical duct configuration.  Mild dilation of the upstream   pancreatic duct to the level of the neck with an apparent filling defect or   stricture at the area of caliber change.  Potential leak of fluid from the   upstream main duct in the tail.  Mild to moderate parenchymal atrophy. Suggestion of parenchymal edema in the tail.  No abnormal enhancement       SPLEEN:  Mildly atrophic.  Increased signal on opposed-phase images and   decreased signal on T2-weighted images, suggesting iron deposition.       ADRENAL GLANDS:  Normal.       KIDNEYS:  Normal.       GI/BOWEL:  Normal course and caliber of the stomach, small bowel, colon, and   rectum without obstruction.       PELVIS:  Surgical absence of the urinary bladder and likely of the ovaries   better assessed on CT.  Normal urinary bladder.       PERITONEUM/RETROPERITONEUM:  Unchanged nonenlarged to mildly enlarged left   para-aortic lymph nodes.  Trace simple free intraperitoneal fluid.    Approximately 13.8 cm x 6.5 cm x 10.3 cm heterogeneous though predominantly   T1 hyperintense and T2 hypointense lesion in the left subphrenic space near   the stomach, spleen, and pancreatic tail with possible diffusion restriction   and peripheral enhancement.       VESSELS:  Typical arterial anatomy.  Mild to moderate systemic   atherosclerosis.  Normal variant circumaortic left renal vein.  Patent veins.       SOFT TISSUES/BONES:  Tiny fat containing umbilical hernia.  Signal   abnormalities related to suspected subacute compression deformities in the   thoracic and lumbar spine.  Multilevel degenerative disc disease.           Impression   1. Suggestion of interstitial edematous pancreatitis in the pancreatic tail,   consistent with acute on chronic pancreatitis given the CT appearance. 2. Approximately 13.8 cm x 6.5 cm x 2.3 cm heterogeneous collection in the   left subphrenic space.  Moderate fat necrosis could have this appearance, but   the affected area is outside the pancreas with no findings of necrotizing   pancreatitis.  Additionally, the finding was absent less than 4 weeks prior. There is suspicion for continuity of the lesion with the main pancreatic duct   in the tail, although this is incompletely evaluated due to only incomplete   inclusion of the affected area on the 3D MRCP sequence.  This suggest the   possibility of fat necrosis from leaked pancreatic fluid.  Abscess is an   additional consideration. 3. Mild dilation of the upstream pancreatic duct with abrupt caliber change   in the pancreatic neck potentially due to a filling defect such as   calcification as seen on CT or a stricture.  There are no findings suggestive   of an obstructing malignancy. 4. Mild intrahepatic and extrahepatic biliary dilation of indeterminate   etiology with no findings of choledocholithiasis.  Consider ERCP if there are   clinical findings of cholestasis. 5. Left para-aortic lymphadenopathy is likely reactive.  Recommend attention   on follow-up imaging.        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24, the patient underwent 2 surgical resections without known recurrence    Metabolic encephalopathy    AMAN (generalized anxiety disorder)    Major depressive disorder, recurrent severe without psychotic features ([address]    Chronic peripheral neuropathic pain    History of alcohol abuse    Personal history of astrocytoma    Marijuana abuse    Closed fracture of fifth thoracic vertebra (HCC)    Diverticulitis of large intestine with abscess without bleeding    Elevated brain natriuretic peptide (BNP) level    Elevated alkaline phosphatase level    Chronic pancreatitis (HCC)    Erythema ab igne    Compression fracture of thoracic vertebra (HCC)    Compression of lumbar vertebra (HCC)    Metabolic acidosis with increased anion gap and accumulation of organic acids    Parapneumonic effusion    Septicemia ([address]    Alcohol-induced acute pancreatitis  Resolved Problems:    * No resolved hospital problems. *       GI Impression:    Acute diverticulitis with possible perforation-FRANSISCO drain in place with serosanguineous output  Acute abdominal pain secondary to #1  Leukocytosis most likely due to #1  Repeated pleural effusions-S/P thoracentesis x2  History of alcohol misuse disorder-last drink 12/2021  Multiple lumbar compression fractures      Plan and Recommendations:    Continue antibiotic management per ID  Continue supportive care with IV fluids, antiemetics, pain medication  We will give patient trial of clears, if able to tolerate please supplement with clear ensures  Patient will need a calorie count to see if she can meet two thirds of her needs with clear liquid diet and supplements  Recommend dietitian consult for calorie count and recommendations  Will follow      This plan was formulated in collaboration with Dr. Kyle Martines MD    Thank you for allowing me to participate in the care of your patient. Please feel free to contact me with any questions or concerns.      [facility]    This note was created with the assistance of a speech-recognition program.  Although the intention is to generate a document that actually reflects the content of the visit, no guarantees can be provided that every mistake has been identified and corrected by editing.

## 2021-08-01 NOTE — PLAN OF CARE
Problem: Pain:  Goal: Pain level will decrease  Description: Pain level will decrease  7/31/2021 2213 by Goldie Gonzalez RN  Outcome: Ongoing  7/31/2021 1809 by Destiny Nolan RN  Outcome: Ongoing  Goal: Control of acute pain  Description: Control of acute pain  7/31/2021 1809 by Destiny Nolan RN  Outcome: Ongoing  Goal: Control of chronic pain  Description: Control of chronic pain  7/31/2021 1809 by Destiny Nolan RN  Outcome: Ongoing     Problem: Skin Integrity:  Goal: Will show no infection signs and symptoms  Description: Will show no infection signs and symptoms  7/31/2021 2213 by Goldie Gonzalez RN  Outcome: Ongoing  7/31/2021 1809 by Destiny Nolan RN  Outcome: Ongoing  Goal: Absence of new skin breakdown  Description: Absence of new skin breakdown  7/31/2021 1809 by Destiny Nolan RN  Outcome: Ongoing     Problem: Falls - Risk of:  Goal: Will remain free from falls  Description: Will remain free from falls  7/31/2021 2213 by Goldie Gonzalez RN  Outcome: Ongoing  7/31/2021 1809 by Destiny Nolan RN  Outcome: Ongoing  Goal: Absence of physical injury  Description: Absence of physical injury  7/31/2021 1809 by Destiny Nolan RN  Outcome: Ongoing     Problem: Nutrition  Goal: Optimal nutrition therapy  7/31/2021 2213 by Goldie Gonzalez RN  Outcome: Ongoing  7/31/2021 1809 by Destiny Nolan RN  Outcome: Ongoing

## 2021-08-01 NOTE — PROGRESS NOTES
[facility]BR[facility] ([facility] [A41.9] . Vitals:    08/01/21 0814   BP:    Pulse:    Resp: 21   Temp:    SpO2: 91%   . Patients home meds are   Prior to Admission medications    Medication Sig Start Date End Date Taking? Authorizing Provider   acamprosate (CAMPRAL) 333 MG tablet Take 2 tablets by mouth 3 times daily 7/16/21 8/15/21  LOI Maharaj NP   sertraline (ZOLOFT) 50 MG tablet Take 3 tablets by mouth daily for 7 days, THEN 2 tablets daily for 7 days, THEN 1 tablet daily for 7 days. 7/16/21 8/6/21  LOI Maharaj NP   FLUoxetine (PROZAC) 20 MG tablet Take 0.5 tablets by mouth daily for 7 days, THEN 1 tablet daily for 23 days. 7/16/21 8/15/21  LOI Maharaj NP   traZODone (DESYREL) 50 MG tablet TAKE 1 AND 1/2 TABLET BY MOUTH ONCE NIGHTLY AS NEEDED FOR SLEEP 7/16/21   LOI Maharaj NP   thermotabs (MEDI-LYTE) TABS tablet Take 1 tablet by mouth daily for 3 days 7/9/21 7/12/21  LOI Quijano NP   carBAMazepine (TEGRETOL XR) 200 MG extended release tablet Take 1 tablet by mouth 2 times daily 7/6/21   Miguel A Reich MD   topiramate (TOPAMAX) 25 MG tablet Take 2 tablets by mouth daily 7/6/21   Miguel A Reich MD   pregabalin (LYRICA) 200 MG capsule Take 1 capsule by mouth 3 times daily for 90 days.  7/2/21 9/30/21  Miguel A Reich MD   KLOR-CON M20 20 MEQ extended release tablet TAKE ONE TABLET BY MOUTH DAILY 6/24/21   Ludivina Huang MD   amLODIPine (NORVASC) 5 MG tablet TAKE ONE TABLET BY MOUTH DAILY 6/3/21   Ludivina Huang MD   busPIRone (BUSPAR) 15 MG tablet Take 1 tablet by mouth 3 times daily 5/5/21   Ludivina Huang MD   baclofen (LIORESAL) 10 MG tablet Take 1 tablet by mouth 3 times daily 5/25/21   Ludivina Huang MD   ibuprofen (ADVIL;MOTRIN) 600 MG tablet Take 1 tablet by mouth 3 times daily as needed for Pain 5/25/21   Ludivina Huang MD   sertraline (ZOLOFT) 100 MG tablet Take 2 tablets by mouth daily 5/17/21   LOI Cabral - NP   ferrous sulfate (IRON 325) 325 (65 Fe) MG tablet Take 1 tablet by mouth 2 times daily 4/22/21   Ludivina Pete MD   rOPINIRole (REQUIP) 1 MG tablet Take 1 tablet by mouth nightly 4/1/21   Ludivina Pete MD   budesonide-formoterol [facility]) 160-4.5 MCG/ACT AERO Inhale 2 puffs into the lungs 2 times daily 3/31/21   Ludivina Pete MD   hydrOXYzine (ATARAX) 50 MG tablet TAKE ONE TABLET BY MOUTH THREE TIMES A DAY 3/12/21   Ludivina Pete MD   tiotropium (SPIRIVA RESPIMAT) 2.5 MCG/ACT AERS inhaler Inhale 2 puffs into the lungs daily 2/26/21 7/8/21  Claudia Smith MD   sodium chloride (ALTAMIST SPRAY) 0.65 % nasal spray 1 spray by Nasal route as needed for Congestion 12/28/20   Ludivina Pete MD   ACETAMINOPHEN EXTRA STRENGTH 500 MG tablet Take 500 mg by mouth 3 times daily as needed 5/8/20   Historical MD Keyonna   albuterol sulfate HFA (VENTOLIN HFA) 108 (90 Base) MCG/ACT inhaler Inhale 2 puffs into the lungs 4 times daily as needed for Wheezing 6/11/20   Ludivina Pete MD   vitamin B-1 (THIAMINE) 100 MG tablet Take 1 tablet by mouth daily 7/12/19   Ludivina Pete MD   vitamin D (ERGOCALCIFEROL) 30244 units CAPS capsule Take 1 capsule by mouth once a week 6/19/19   Ludivina Pete MD   folic acid (FOLVITE) 1 MG tablet Take 1 tablet by mouth daily 6/19/19   Ludivina Pete MD   .  Recent Surgical History: noneAssessment pancreatitis  BS clear  tx prn at home    RR 20  Breath Sounds: clear    Bronchodilator assessment at level 1Secretion Management assessment at level      [x]    Bronchodilator Assessment  BRONCHODILATOR ASSESSMENT SCORE  Score 0 1 2 3 4 5   Breath Sounds   []  Patient Baseline [x]  No Wheeze good aeration []  Faint, scattered wheezing, good aeration []  Expiratory Wheezing and or moderately diminished []  Insp/Exp wheeze and/or very diminished []  Insp/Exp and/ or marked distress   Respiratory Rate   []  Patient Baseline [x]  Less than 20 []  Less than 20 []  20-25 []  Greater than 25 []  Greater than 25   Peak flow % of Pred or PB []  NA   []  Greater than 90%  []  81-90% []  71-80% []  Less than or equal to 70%  or unable to perform []  Unable due to Respiratory Distress   Dyspnea re []  Patient Baseline [x]  No SOB [x]  No SOB []  SOB on exertion []  SOB min activity []  At rest/acute   e FEV% Predicted       []  NA []  Above 69%  []  Unable []  Above 60-69%  []  Unable []  Above 50-59%  []  Unable []  Above 35-49%  []  Unable []  Less than 35%  []  Unable                 []  Hyperinflation Assessment  Score 1 2 3   CXR and Breath Sounds   []  Clear []  No atelectasis  Basilar aeration []  Atelectasis or absent basilar breath sounds   Incentive Spirometry Volume  (Per IBW)   []  Greater than or equal to 15ml/Kg []  less than 15ml/Kg []  less than 15ml/Kg   Surgery within last 2 weeks []  None or general   []  Abdominal or thoracic surgery  []  Abdominal or thoracic   Chronic Pulmonary Historyre []  No []  Yes []  Yes     []  Secretion Management Assessment  Score 1 2 3   Bilateral Breath Sounds   []  Occasional Rhonchi []  Scattered Rhonchi []  Course Rhonchi and/or poor aeration   Sputum    []  Small amount of thin secretions []  Moderate amount of viscous secretions []  Copius, Viscious Yellow/ Secretions   CXR as reported by physician []  clear  []  Unavailable []  Infiltrates and/or consolidation  []  Unavailable []  Mucus Plugging and or lobar consolidation  []  Unavailable   Cough []  Strong, productive cough []  Weak productive cough []  No cough or weak non-productive cough   LANDON AMBROSIO RCP  9:55 AM                            FEMALE                                  MALE                            FEV1 Predicted Normal Values                        FEV1 Predicted Normal Values          Age                                     Height in Feet and Inches       Age                                     Height in Feet and Inches       4' 11\" 5' 1\" 5' 3\" 5' 5\" 5' 7\" 5' 9\" 5' 11\" 6' 1\"  4' 11\" 5' 1\" 5' 3\" 5' 5\" 5' 7\" 5' 9\" 5' 11\" 6' 1\"   42 - 45 2.49 2.66 2.84 3.03 3.22 3.42 3.62 3.83 42 - 45 2.82 3.03 3.26 3.49 3.72 3.96 4.22 4.47   46 - 49 2.40 2.57 2.76 2.94 3.14 3.33 3.54 3.75 46 - 49 2.70 2.92 3.14 3.37 3.61 3.85 4.10 4.36   50 - 53 2.31 2.48 2.66 2.85 3.04 3.24 3.45 3.66 50 - 53 2.58 2.80 3.02 3.25 3.49 3.73 3.98 4.24   54 - 57 2.21 2.38 2.57 2.75 2.95 3.14 3.35 3.56 54 - 57 2.46 2.67 2.89 3.12 3.36 3.60 3.85 4.11   58 - 61 2.10 2.28 2.46 2.65 2.84 3.04 3.24 3.45 58 - 61 2.32 2.54 2.76 2.99 3.23 3.47 3.72 3.98   62 - 65 1.99 2.17 2.35 2.54 2.73 2.93 3.13 3.34 62 - 65 2.19 2.40 2.62 2.85 3.09 3.33 3.58 3.84   66 - 69 1.88 2.05 2.23 2.42 2.61 2.81 3.02 3.23 66 - 69 2.04 2.26 2.48 2.71 2.95 3.19 3.44 3.70   70+ 1.82 1.99 2.17 2.36 2.55 2.75 2.95 3.16 70+ 1.97 2.19 2.41 2.64 2.87 3.12 3.37 3.62             Predicted Peak Expiratory Flow Rate                                       Height (in)  Female       Height (in) Male           Age 64 62 64 63 57 71 78 74 Age            66 759 070 791 066 228 093 954 107  31 74 16 31 04 20 25 96 59   25 337 352 366 381 396 411 426 441 25 447 476 505 533 562 591 619 648 677   30 329 344 359 374 389 404 419 434 30 437 466 494 523 552 580 609 638 667   35 322 337 351 366 381 396 411 426 35 426 455 484 512 541 570 598 627 657   40 314 329 344 359 374 389 404 419 40 416 445 473 502 531 559 588 617 647   45 307 322 336 351 366 381 396 411 45 405 434 463 491 520 549 577 606 636   50 299 314 329 344 359 374 389 404 50 395 424 452 481 510 538 567 596 625   55 292 307 321 336 351 366 381 396 55 384 413 442 470 499 528 556 585 615   60 284 299 314 329 344 359 374 389 60 374 403 431 460 489 517 546 575 605   65 277 292 306 321 336 351 366 381 65 363 392 421 449 478 507 535 564 594   70 269 284 299 314 329 344 359 374 70 353 382 410 439 468 496 525 554 583   75 261 274 289 305 319 334 348 364 75 344 372 400 429 458 487 515 544 573   80 253 266 282 296 312 327 342 356 80 335 362 390 419 448 476 505 534 562           BRONCHOSPASM/BRONCHOCONSTRICTION     [x]         IMPROVE AERATION/BREATH SOUNDS  [x]   ADMINISTER BRONCHODILATOR THERAPY AS APPROPRIATE  [x]   ASSESS BREATH SOUNDS  [x]   IMPLEMENT AEROSOL/MDI PROTOCOL  [x]   PATIENT EDUCATION AS NEEDED

## 2021-08-01 NOTE — PROGRESS NOTES
[facility]  Office: [address], DO, Tawanna Brink, DO, Cameron Botello, DO, Melissa Huang, DO, John Cuello MD, Marek Khan MD, Candi Flores MD, Vince Stockton MD, Lashell Burns MD, Gisela Cruz MD, Kena Longoria MD, Ayesha West, DO, Avi Joyce MD, Maricel Iyer DO, Guerita Golden MD,  Ritika Key DO, Vertell Moritz, MD, Christal Lucero MD, Tasha Kang MD, Ghulam Butler MD, Marycarmen Cortez MD, María Flores MD, Osvaldo Gutierrez MD, Reggie Kessler, [facility], [facility], CNP, Tr Wild, CNP, Heath Fontanez, CNS, Yogesh Appl, CNP, Lorena , CNP, Jae Fresh, CNP, Deidre Galvan, CNP, Tomeka Reid, CNP, Mariya Duque PA-C, Roberto Nails, [facility], Shey Turner, CNP, Randi Relic, CNP, Bud Sussy, CNP, Evie Lobo, CNP, Dawayne Oppenheim, CNP, Nahomi Fall, CNP, Alesha Ramírez, [facility], [employer], [address] [place] [address]    Progress Note    8/1/2021    9:24 AM    Name:   Ana Delaney  MRN:     [id number]     Acct:      [de-identified]   Room:   2022/2022-01  IP Day:  6  Admit Date:  7/21/2021  3:06 PM    PCP:   Yusuf Quinonez MD  Code Status:  Full Code    Subjective:     C/C:   Chief Complaint   Patient presents with    Back Pain     states chronic back pain becoming worse.  has recently started physical therapy     Interval History Status: not changed. Patient seen and examined. Mother at bedside. No notebook today in her hand. Patient hungry wants to try foods. Did have good questions about her lab values. WBC slowly trending donw    Brief History:     79-year-old female past medical history of hypertension, smoker, seizure disorder, COPD, generalized anxiety disorder, history of alcohol abuse, history of marijuana abuse, presented urgently on 7/21/2021 due to back pain with concern of urinary tract infection causing sepsis.   Patient found to have compression fractures and was evaluated by neurosurgery, pain control and no acute intervention planned. Evaluated by neurology for concern of worsening seizures, topomax and tegretol were adjusted. Pulmonology has previously evaluated patient when having worsening respiratory distress, has remained stable on oxygen at this time. Patient was also treated for COPD exacerbation, and was monitored for chronic pancreatitis history patient was originally treated for colitis however continued to worsen and was evaluated by general surgery found to have colonic abscess with FRANSISCO drain 7/28/2021 and thoracentesis on the left side with 400 mL removed. Patient underwent second thoracenteis with 450 mL removed 7/30/2021. Patient also having evolving pancreatitis and being re-evaluated by GI. MRCP planned 7/31/2021. General surgery has also been following for colonic abscess and concern for possible need of surgical intervention. Review of Systems:     Review of Systems   Constitutional: Positive for fatigue. Negative for activity change and chills. HENT: Negative for congestion and trouble swallowing. Eyes: Negative for discharge and visual disturbance. Respiratory: Negative for apnea, cough and shortness of breath. Cardiovascular: Negative for chest pain and palpitations. Gastrointestinal: Positive for abdominal pain. Negative for abdominal distention, nausea and vomiting. Endocrine: Negative for cold intolerance and polyphagia. Genitourinary: Negative for difficulty urinating and dysuria. Musculoskeletal: Negative for arthralgias, joint swelling and neck stiffness. Skin: Negative for color change and rash. Neurological: Negative for dizziness and light-headedness. Hematological: Negative for adenopathy. Does not bruise/bleed easily. Psychiatric/Behavioral: Negative for confusion and decreased concentration. Medications:      Allergies:  No Known Allergies    Current Meds:   Scheduled Meds:    sodium chloride flush 5-40 mL Intravenous BID    pantoprazole  40 mg Oral QAM AC    guaiFENesin  600 mg Oral BID    nicotine  1 patch Transdermal Daily    ipratropium-albuterol  1 ampule Inhalation TID    lidocaine  1 patch Transdermal Daily    spironolactone  50 mg Oral Daily    polyethylene glycol  17 g Oral BID    carBAMazepine  200 mg Oral TID    topiramate  50 mg Oral BID    amLODIPine  5 mg Oral Daily    baclofen  10 mg Oral TID    budesonide-formoterol  2 puff Inhalation BID    busPIRone  15 mg Oral TID    ferrous sulfate  325 mg Oral BID    folic acid  1 mg Oral Daily    potassium chloride  20 mEq Oral Daily with breakfast    pregabalin  200 mg Oral TID    rOPINIRole  1 mg Oral Nightly    sodium chloride flush  5-40 mL Intravenous 2 times per day    enoxaparin  40 mg Subcutaneous Daily     Continuous Infusions:    lactated ringers 100 mL/hr at 07/31/21 2350    sodium chloride 25 mL (07/24/21 1803)     PRN Meds: magnesium sulfate, fentanNYL **OR** fentanNYL, potassium chloride **OR** potassium alternative oral replacement **OR** potassium chloride, LORazepam, hyoscyamine, hydrOXYzine, bisacodyl, albuterol, traZODone, sodium chloride flush, sodium chloride, acetaminophen **OR** acetaminophen, [Held by provider] oxyCODONE-acetaminophen **OR** [DISCONTINUED] oxyCODONE-acetaminophen, melatonin    Data:     Past Medical History:   has a past medical history of Acute respiratory failure with hypoxia ([facility] Alcohol withdrawal syndrome, with delirium ([facility] Alcoholism ([facility] Anemia, Astrocytoma ([facility] - 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24, the patient underwent 2 surgical resections without known recurrence, Closed fracture of lateral portion of left tibial plateau, COPD (chronic obstructive pulmonary disease) ([facility] [facility] Depression, Dysphagia, GI bleed, Hypertension, Memory loss, Oxygen dependent, Pain, joint, ankle and foot, Pancreatic lesion, Peripheral neuropathy, Seizures ([facility] Tension headache, Under care of team, Under care of team, Under care of team, Under care of team, Under care of team, and Wellness examination. Social History:   reports that she has been smoking cigarettes. She has a 15.00 pack-year smoking history. She has never used smokeless tobacco. She reports previous alcohol use. She reports current drug use. Frequency: 2.00 times per week. Drug: Marijuana. Family History:   Family History   Problem Relation Age of Onset    Other Father     Cancer Maternal Grandmother     Heart Disease Paternal Grandmother     Esophageal Cancer Maternal Aunt        Vitals:  BP (!) 149/95   Pulse 82   Temp 98.6 °F (37 °C) (Axillary)   Resp 21   Ht 5' 5\" (1.651 m)   Wt 142 lb 9.6 oz (64.7 kg)   SpO2 91%   BMI 23.73 kg/m²   Temp (24hrs), Av.8 °F (37.1 °C), Min:98.6 °F (37 °C), Max:99.2 °F (37.3 °C)    No results for input(s): POCGLU in the last 72 hours. I/O (24Hr):     Intake/Output Summary (Last 24 hours) at 2021 0924  Last data filed at 2021 0453  Gross per 24 hour   Intake 1183 ml   Output 2850 ml   Net -1667 ml       Labs:  Hematology:  Recent Labs     21  0608 21  0831 21  0638   WBC 25.2* 24.9* 18.1*   RBC 3.14* 3.36* 3.03*   HGB 9.4* 9.8* 9.0*   HCT 28.8* 31.0* 27.8*   MCV 91.7 92.3 91.7   MCH 29.9 29.2 29.7   MCHC 32.6 31.6 32.4   RDW 13.2 13.1 13.1    467* 474*   MPV 9.8 9.9 9.9     Chemistry:  Recent Labs     21  1030 21  0608 21  0608 21  1004 21  1850 21  0831 21  0638   NA  --  137  --   --   --   --  139 139   K  --  3.3*   < >  --   --  3.9 4.2 3.4*   CL  --  100  --   --   --   --  100 100   CO2  --  21  --   --   --   --  24 24   GLUCOSE  --  78  --   --   --   --  87 77   BUN  --  7  --   --   --   --  5* 4*   CREATININE  --  0.52  --   --   --   --  0.50 0.36*   MG  --  1.5*  --   --  1.9 1.9  --   --    ANIONGAP  --  16  --   --   --   --  15 15   LABGLOM  --  >60  --   --   --   --  >60 >60   GFRAA  --  >60  -- --   --   --  >60 >60   CALCIUM  --  8.4*  --   --   --   --  8.9 8.7   LACTACIDWB 1.3  --   --  0.6*  --   --   --   --     < > = values in this interval not displayed. Recent Labs     07/31/21  0831 08/01/21  0638   PROT 6.1* 5.5*   LABALBU 2.6* 2.3*   AST 33* 20   ALT 21 15   ALKPHOS 440* 358*   BILITOT 0.47 0.35   BILIDIR 0.31* 0.22   AMYLASE 35  --    LIPASE 80*  --      ABG:  Lab Results   Component Value Date    POCPH 7.467 07/26/2021    POCPCO2 44.7 07/26/2021    POCPO2 71.9 07/26/2021    POCHCO3 32.3 07/26/2021    NBEA NOT REPORTED 07/26/2021    PBEA 8 07/26/2021    KUE7TSR NOT REPORTED 07/26/2021    GPAU6TPE 95 07/26/2021    FIO2 INFORMATION NOT PROVIDED 07/28/2021     Lab Results   Component Value Date/Time    SPECIAL NOT REPORTED 07/30/2021 05:20 PM     Lab Results   Component Value Date/Time    CULTURE NO GROWTH 2 DAYS 07/30/2021 05:20 PM       Radiology:  XR CHEST (SINGLE VIEW FRONTAL)    Result Date: 7/29/2021  Pulmonary congestion with bibasilar effusions and adjacent infiltrates. XR CHEST (SINGLE VIEW FRONTAL)    Result Date: 7/27/2021  Interval improvement in still mild interstitial edema with bilateral asymmetric, left greater than right, pleural effusions     XR ACUTE ABD SERIES CHEST 1 VW    Result Date: 7/24/2021  Bilateral airspace disease and pleural effusions. Findings in the upper lobes appear increased compared to prior, left greater than right. Findings may be related to asymmetric edema with superimposed pneumonia not excluded. Gas and stool are seen throughout nondilated bowel loops with few nonspecific air-fluid levels in the right lower abdomen, likely in small bowel. Findings may be physiologic or related to mild ileus. No evidence of obstruction or free air. CT HEAD WO CONTRAST    Result Date: 7/25/2021  No acute intracranial abnormality. Suboccipital craniectomy.      CT ABDOMEN PELVIS W IV CONTRAST Additional Contrast? Oral and Rectal    Result Date: 7/30/2021  Increasing multiloculated rim enhancing fluid collections in the left upper quadrant. Slightly decreased size of the dominant pocket of fluid which now contains a percutaneous drainage catheter, though there are innumerable new smaller collections and other previously present smaller collections which are increased in size. I suspect many of these do not communicate with the pocket of fluid containing the drainage catheter. Excellent opacification of the colon with enteric contrast without definitive enteric contrast in the left upper quadrant collection to confirm a colonic source. Pancreatic source is a possible alternative consideration in the appropriate clinical setting. Persistent colonic wall thickening in the region of the hepatic flexure on a background of probable ileus. Increased urinary bladder wall thickening with slightly increased perinephric stranding. Correlate with urinary labs for contributory urinary tract infection. CT ABDOMEN PELVIS W IV CONTRAST Additional Contrast? Radiologist Recommendation    Result Date: 7/27/2021  1. Large complex fluid collection in the left upper quadrant, concerning for abscess. This is likely related to the previously described colitis. 2. Consolidation and ground-glass opacity in both lower lobes with left pleural effusion, concerning for multifocal pneumonia. 3. Diffuse urinary bladder wall thickening. This could be due to underdistention, but correlation for any signs or symptoms of cystitis is recommended. 4. Chronic pancreatitis. 5. Nonobstructing left nephrolithiasis. XR CHEST PORTABLE    Result Date: 7/25/2021  1. Increasing vascular congestion and left perihilar opacity. 2.  Pleural effusions and basilar opacities are otherwise without significant change. CT ABSCESS DRAINAGE    Result Date: 7/28/2021  Successful percutaneous ultrasound and CT guided placement of 8 [place] left upper quadrant drainage catheter.  New high attenuation material within the multi loculated left upper quadrant fluid collection suggests leakage from the adjacent abnormal colonic segment indicating micro perforation. The above findings were called by Dr. Holly Gurrola MD to Dr. Salas Mckinley On 7/28/2021 at 12:32. IR GUIDED THORACENTESIS PLEURAL    Result Date: 7/28/2021  Successful ultrasound guided thoracentesis. Physical Examination:        Physical Exam  Constitutional:       Comments: Chronically ill appearing, nasal cannula   HENT:      Head: Normocephalic and atraumatic. Right Ear: External ear normal.      Left Ear: External ear normal.      Nose:      Comments: Nasal cannula     Mouth/Throat:      Mouth: Mucous membranes are moist.   Eyes:      Extraocular Movements: Extraocular movements intact. Pupils: Pupils are equal, round, and reactive to light. Cardiovascular:      Rate and Rhythm: Normal rate and regular rhythm. Pulses: Normal pulses. Heart sounds: Normal heart sounds. Pulmonary:      Effort: Prolonged expiration present. No respiratory distress. Breath sounds: No stridor or transmitted upper airway sounds. Decreased breath sounds present. No wheezing or rhonchi. Chest:      Chest wall: There is no dullness to percussion. Abdominal:      General: Bowel sounds are normal. There is no distension. Palpations: Abdomen is soft. There is no hepatomegaly. Tenderness: There is generalized abdominal tenderness and tenderness in the left upper quadrant. There is no right CVA tenderness or left CVA tenderness. Comments: FRANSISCO  Drain small serosanguinous fluid   Musculoskeletal:         General: No swelling. Cervical back: Neck supple. No rigidity. Right lower leg: No edema. Left lower leg: No edema. Skin:     General: Skin is warm and dry. Capillary Refill: Capillary refill takes less than 2 seconds.    Neurological:      Mental Status: She is alert and oriented to person, place, and time.   Psychiatric:         Attention and Perception: Attention normal. She does not perceive auditory hallucinations. Mood and Affect: Mood normal. Affect is labile. Speech: Speech normal.         Behavior: Behavior is cooperative. Thought Content: Thought content normal.         Cognition and Memory: Memory is impaired.          Judgment: Judgment normal.           Assessment:        Hospital Problems         Last Modified POA    * (Principal) Severe sepsis ([address] 7/28/2021 Yes    Essential hypertension 7/21/2021 Yes    Tobacco use 7/21/2021 Yes    Seizure disorder ([address] 7/25/2021 Yes    COPD with acute exacerbation ([address] 7/22/2021 Yes    Acute respiratory failure with hypoxia ([address] 7/22/2021 Yes    Recurrent major depressive disorder ([address] 7/21/2021 Yes    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24, the patient underwent 2 surgical resections without known recurrence 3/27/3592 Yes    Metabolic encephalopathy 5/39/1693 Yes    AMAN (generalized anxiety disorder) 7/21/2021 Yes    Major depressive disorder, recurrent severe without psychotic features ([address] 7/31/2021 Yes    Chronic peripheral neuropathic pain 7/21/2021 Yes    History of alcohol abuse 7/21/2021 Yes    Personal history of astrocytoma 7/21/2021 Yes    Marijuana abuse 7/21/2021 Yes    Closed fracture of fifth thoracic vertebra ([address] 7/22/2021 Yes    Diverticulitis of large intestine with abscess without bleeding 7/28/2021 Yes    Elevated brain natriuretic peptide (BNP) level 7/22/2021 Yes    Elevated alkaline phosphatase level 7/22/2021 Yes    Chronic pancreatitis ([address] 7/22/2021 Yes    Erythema ab igne 7/22/2021 Yes    Compression fracture of thoracic vertebra ([address] 7/23/2021 Yes    Compression of lumbar vertebra ([address] 0/45/8495 Yes    Metabolic acidosis with increased anion gap and accumulation of organic acids 7/28/2021 Yes    Parapneumonic effusion 7/29/2021 Yes    Septicemia ([address] 7/29/2021 Yes    Alcohol-induced acute pancreatitis 7/31/2021 Yes          Plan:        Colonic abscess secondary to diverticulitis requiring FRANSISCO drain. Appreciate General surgery and ID recommendations. Zosyn  Acute on chronic pancreatitis with fluid collection in LUQ as well as pancreatic fluid on thoracentesis. MRCP pending. Appreciate GI recs. Monitor Levido reticularis on back  Smoker, completed treatment for COPD exacerbation. Appreciate Pulm recs. Bipap as needed, wean down Oxygen as tolerated, Incentive spirometry. Encourage smoking cessation. Mucinex. Status post left thoracentesis on 7/28/2021 and 7/30/2021  HTN. Norvasc, adlactone  Seizure history of petit mal seizures. Appreciate neurology recs. Tregretol 200 TID  Migraines. Topamax 50 mg BID  Depression. buspar  Hypokalemia, replace with potassium  Restless leg syndrome. ropinerol  Neuropathy. Lyrica  Protonix GI prophylaxis  PT/OT  Start CLD, appreciate GI and GS recommendations, will also see if we can get Bariatric Nutritional supplements    Pam Mixon MD  8/1/2021  9:24 AM     Discussed with patient at length about smoking and smoking cessation. Patient has nicoderm, and advised to abstain from smoking. She has been requiring oxygen and I am concerned for her overall well being.

## 2021-08-01 NOTE — PROGRESS NOTES
PROGRESS NOTE      PATIENT NAME: Desmond Wong RECORD NO. [id number]  DATE: 8/1/2021  SURGEON: Magy Jeter  PRIMARY CARE PHYSICIAN: Monika Homans, MD    HD: # 11    ASSESSMENT    Patient Active Problem List   Diagnosis    Acute bronchitis    Reflex sympathetic dystrophy of lower limb    Essential hypertension    Nicotine addiction    Psychophysiological insomnia    Anxiety    Alcoholic peripheral neuropathy (HCC)    Hypokalemia    Macrocytic anemia    Hypomagnesemia    Tobacco use    Ambulatory dysfunction    Seizure disorder ([address]    COPD with acute exacerbation ([address]    Paresthesia of lower extremity    Acute respiratory failure with hypoxia (HCC)    Pancreatic cyst    Recurrent major depressive disorder (HCC)    Elevated CA 19-9 level    Perineal mass, female    Esophagitis candidal     Condyloma    Astrocytoma (HCC) - 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24, the patient underwent 2 surgical resections without known recurrence    Alcohol use disorder, severe, in early remission ([address]    Metabolic encephalopathy    AMAN (generalized anxiety disorder)    Major depressive disorder, recurrent severe without psychotic features ([address]    Esophageal dysphagia    Chronic peripheral neuropathic pain    History of alcohol abuse    History of petit-mal seizures    Intractable episodic tension-type headache    Personal history of astrocytoma    Multilevel spine pain    Chronic pain syndrome    Marijuana abuse    Severe sepsis (HCC)    Closed fracture of fifth thoracic vertebra (HCC)    Diverticulitis of large intestine with abscess without bleeding    Elevated brain natriuretic peptide (BNP) level    Elevated alkaline phosphatase level    Chronic pancreatitis (HCC)    Erythema ab igne    Compression fracture of thoracic vertebra (HCC)    Compression of lumbar vertebra (HCC)    Metabolic acidosis with increased anion gap and accumulation of organic acids    Parapneumonic effusion    Septicemia ([facility]    Alcohol-induced acute pancreatitis       MEDICAL DECISION MAKING AND PLAN    Crystal Linares is a 45 y.o. female who originally presented to the ED with septicemia on . General surgery was consulted for severe abdominal pain that was progressively worsening. Lactate reached a high of 8.7 with a worsening clinical picture, and we recommended drainage by IR of the left intra-abdominal abscess. Drain is producing 195 cc total dark serosang fluid. Plan:     MRCP  consistent with acute on chronic pancreatitis and heterogenous fluid collection in the left subphrenic space: Fat necrosis versus necrotizing pancreatitis   Abdominal exam much improved   Leukocytosis: Improved today 18.1 from 24.9   Diet: NPO, okay to advance diet to CLD from general surgery perspective   DVT ppx: Lovenox   Urine output: 1.8 mL/kg/hr   GI Recs: No ERCP at this time, can start CLD   Disposition: Clinically improving, continue to monitor WBC        SUBJECTIVE    Crystal Linares was seen and examined at bedside this morning. No acute events overnight. Pt. denies CP, S OB, N/B, C/F. Abdominal pain much improved. Pt is having normal bowel movements and reports an appetite. VSS, afebrile, T-max 37.3. OBJECTIVE  VITALS: Temp: Temp: 98.6 °F (37 °C)Temp  Av.8 °F (37.1 °C)  Min: 98.6 °F (37 °C)  Max: 99.2 °F (72.9 °C) BP Systolic (08WZY), IIL:026 , Min:128 , USE:380   Diastolic (64PHU), BKA:10, Min:78, Max:95   Pulse Pulse  Av.4  Min: 82  Max: 103 Resp Resp  Av.8  Min: 15  Max: 28 Pulse ox SpO2  Av.4 %  Min: 87 %  Max: 99 %      Physical Exam    GENERAL: alert, cooperative,  NAD  NEURO: A&Ox4  HEENT: NCAT, PERRLA  : normal  LUNGS: Unlabored breathing on 2L NC  HEART: normal rate and regular rhythm  ABDOMEN: Soft, nondistended, no TTP. Nonperitoneal without rigidity/rebound/guarding. FRANSISCO drain in LUQ with very dark sanguinous fluid consistent with old bleeding.   EXTREMITY: no cyanosis, clubbing or edema    Ins and outs: I/O last 3 completed shifts: In: 1183 [I.V.:1133; IV Piggyback:50]  Out: 1540 [Urine:2850]    Drain/tube output: In: 1183 [I.V.:1133]  Out: 1900 [Urine:1900]    LAB:  CBC:   Recent Labs     07/30/21 0608 07/31/21 0831 08/01/21  0638   WBC 25.2* 24.9* 18.1*   HGB 9.4* 9.8* 9.0*   HCT 28.8* 31.0* 27.8*   MCV 91.7 92.3 91.7    467* 474*     BMP:   Recent Labs     07/30/21 0608 07/30/21 0608 07/30/21 2015 07/31/21 0831 08/01/21  0638     --   --  139 139   K 3.3*   < > 3.9 4.2 3.4*     --   --  100 100   CO2 21  --   --  24 24   BUN 7  --   --  5* 4*   CREATININE 0.52  --   --  0.50 0.36*   GLUCOSE 78  --   --  87 77    < > = values in this interval not displayed. COAGS:   Recent Labs     07/31/21 0831 08/01/21  0638   PROT 6.1* 5.5*       RADIOLOGY:  CT ABDOMEN PELVIS W IV CONTRAST Additional Contrast? Oral and Rectal    Result Date: 7/30/2021  Increasing multiloculated rim enhancing fluid collections in the left upper quadrant. Slightly decreased size of the dominant pocket of fluid which now contains a percutaneous drainage catheter, though there are innumerable new smaller collections and other previously present smaller collections which are increased in size. I suspect many of these do not communicate with the pocket of fluid containing the drainage catheter. Excellent opacification of the colon with enteric contrast without definitive enteric contrast in the left upper quadrant collection to confirm a colonic source. Pancreatic source is a possible alternative consideration in the appropriate clinical setting. Persistent colonic wall thickening in the region of the hepatic flexure on a background of probable ileus. Increased urinary bladder wall thickening with slightly increased perinephric stranding. Correlate with urinary labs for contributory urinary tract infection.      MRI ABDOMEN W WO CONTRAST MRCP    Result Date: 7/31/2021  1. Suggestion of interstitial edematous pancreatitis in the pancreatic tail, consistent with acute on chronic pancreatitis given the CT appearance. 2. Approximately 13.8 cm x 6.5 cm x 2.3 cm heterogeneous collection in the left subphrenic space. Moderate fat necrosis could have this appearance, but the affected area is outside the pancreas with no findings of necrotizing pancreatitis. Additionally, the finding was absent less than 4 weeks prior. There is suspicion for continuity of the lesion with the main pancreatic duct in the tail, although this is incompletely evaluated due to only incomplete inclusion of the affected area on the 3D MRCP sequence. This suggest the possibility of fat necrosis from leaked pancreatic fluid. Abscess is an additional consideration. 3. Mild dilation of the upstream pancreatic duct with abrupt caliber change in the pancreatic neck potentially due to a filling defect such as calcification as seen on CT or a stricture. There are no findings suggestive of an obstructing malignancy. 4. Mild intrahepatic and extrahepatic biliary dilation of indeterminate etiology with no findings of choledocholithiasis. Consider ERCP if there are clinical findings of cholestasis. 5. Left para-aortic lymphadenopathy is likely reactive. Recommend attention on follow-up imaging. US THORACENTESIS Which side should the procedure be performed? Left    Result Date: 7/31/2021  Successful ultrasound guided left thoracentesis. Nini Lockhart,   7/31/21, 11:00 AM             Trauma Attending [address] [place] [address] [address]      I have reviewed the above [facility] note(s) and confirmed the key elements of the medical history and physical exam. I have seen and examined the pt. I have discussed the findings, established the care plan and recommendations with Resident, [facility] RN, bedside nurse.   Pt with likely pancreatitis   Drain output minimal   Pain improved   Patient stated was hungry   Would start Oral nutrtion GI to re-eval: input regarding ERCP       Senait Jurado DO  8/1/2021  2:07 PM

## 2021-08-01 NOTE — FLOWSHEET NOTE
Patient took 4 chicken broth cups, 2 jello and 1 ensure clear which is 240 calories.  Total calories approx 350

## 2021-08-01 NOTE — CARE COORDINATION
Transitional planning-called Trudi at [facility] to ask if patient needs new auth for placement-left message    Return call from [phone] need new precert

## 2021-08-01 NOTE — PROGRESS NOTES
PULMONARY & CRITICAL CARE MEDICINE PROGRESS NOTE     Patient:  Kady Dutta  MRN: [id number]  Admit date: 2021  Primary Care Physician: Jez Garcia MD  Consulting Physician: Yasmin Verduzco MD  CODE Status: Full Code  LOS: 11     SUBJECTIVE     CHIEF COMPLAINT/REASON FOR INITIAL CONSULT: Acute respiratory failuree respiratory failure  BRIEF HOSPITAL COURSE:   The patient is a 45 y.o. female admitted with abdominal and back pain. She was found to have thoracic spine fracture. Her CT abdomen done on  showed a complex fluid collection 9.3 x 7.3 x 5.4 cm in the left upper quadrant and left lower lobe consolidation/pleural effusion. INTERVAL HISTORY:  21  NO ACUTE COMP  NO WHEEZE   ON CLD  REVIEW OF SYSTEMS:  Review of Systems   Constitutional: Positive for fatigue. Negative for fever. HENT: Negative. Respiratory: Positive for cough and shortness of breath. Negative for wheezing. Cardiovascular: Negative. Gastrointestinal: Negative for abdominal pain, diarrhea and nausea. Neurological: Negative. OBJECTIVE     PaO2/FiO2 RATIO:  No results for input(s): POCPO2 in the last 72 hours.    FiO2 : (S) 50 %     VITAL SIGNS:   LAST:  /83   Pulse 102   Temp 98.6 °F (37 °C) (Oral)   Resp 21   Ht 5' 5\" (1.651 m)   Wt 142 lb 9.6 oz (64.7 kg)   SpO2 95%   BMI 23.73 kg/m²   8-24 HR RANGE:  TEMP Temp  Av.7 °F (37.1 °C)  Min: 98.4 °F (36.9 °C)  Max: 98.8 °F (30.4 °C)   BP Systolic (34AJJ), QBY:618 , Min:108 , JSD:473      Diastolic (95BLA), LIS:35, Min:83, Max:95     PULSE Pulse  Av.9  Min: 82  Max: 103   RR Resp  Av  Min: 20  Max: 22   O2 SAT SpO2  Av.4 %  Min: 90 %  Max: 96 %   OXYGEN DELIVERY O2 Flow Rate (L/min)  Av L/min  Min: 2 L/min  Max: 2 L/min        SYSTEMIC EXAMINATION:   General appearance - Ill-appearing, mild  respiratory distress  Mental status - awake & alert, follows commands  Eyes - pupils equal and reactive, sclera anicteric  Mouth - mucous membranes moist  Neck - supple, no significant adenopathy, carotids upstroke normal bilaterally, no bruits  Chest - Breath sounds bilaterally were dimnished to auscultation at bases  Heart - normal rate, regular rhythm, normal S1, S2, no murmurs, rubs, clicks or gallops  Abdomen - soft, left upper quadrant tenderness WITH DRAIN , no masses or organomegaly  Neurological - non-focal  Extremities - peripheral pulses normal, no pedal edema, no clubbing or cyanosis  Skin - normal coloration and turgor, no rashes, no suspicious skin lesions noted     DATA REVIEW     Medications: Current Inpatient  Scheduled Meds:   sodium chloride flush  5-40 mL Intravenous BID    pantoprazole  40 mg Oral QAM AC    guaiFENesin  600 mg Oral BID    nicotine  1 patch Transdermal Daily    lidocaine  1 patch Transdermal Daily    spironolactone  50 mg Oral Daily    polyethylene glycol  17 g Oral BID    carBAMazepine  200 mg Oral TID    topiramate  50 mg Oral BID    amLODIPine  5 mg Oral Daily    baclofen  10 mg Oral TID    budesonide-formoterol  2 puff Inhalation BID    busPIRone  15 mg Oral TID    ferrous sulfate  325 mg Oral BID    folic acid  1 mg Oral Daily    potassium chloride  20 mEq Oral Daily with breakfast    pregabalin  200 mg Oral TID    rOPINIRole  1 mg Oral Nightly    sodium chloride flush  5-40 mL Intravenous 2 times per day    enoxaparin  40 mg Subcutaneous Daily     Continuous Infusions:   lactated ringers 100 mL/hr at 08/01/21 0927    sodium chloride 25 mL (07/24/21 1803)       INPUT/OUTPUT:  In: 1183 [I.V.:1133]  Out: 2400 [Urine:2400]  Date 08/01/21 0000 - 08/01/21 2359   Shift 7708-9370 5519-9226 4367-8501 24 Hour Total   INTAKE   Shift Total(mL/kg)       OUTPUT   Urine(mL/kg/hr) 600(1.2) 500  1100   Shift Total(mL/kg) 600(9.3) 500(7.7)  1100(17)   Weight (kg) 64.7 64.7 64.7 64.7        LABS:  ABGs:   No results for input(s): POCPH, POCPCO2, POCPO2, POCHCO3, MGDB8BJV in the last 72 hours.  CBC:   Recent Labs     07/30/21 0608 07/31/21 0831 08/01/21  0638   WBC 25.2* 24.9* 18.1*   HGB 9.4* 9.8* 9.0*   HCT 28.8* 31.0* 27.8*   MCV 91.7 92.3 91.7    467* 474*   LYMPHOPCT 11* 10* 11*   RBC 3.14* 3.36* 3.03*   MCH 29.9 29.2 29.7   MCHC 32.6 31.6 32.4   RDW 13.2 13.1 13.1     CRP:   No results for input(s): CRP in the last 72 hours. LDH:   No results for input(s): LDH in the last 72 hours. BMP:   Recent Labs     07/30/21  0608 07/30/21 2015 07/31/21  0831 08/01/21  0638     --  139 139   K 3.3* 3.9 4.2 3.4*     --  100 100   CO2 21  --  24 24   BUN 7  --  5* 4*   CREATININE 0.52  --  0.50 0.36*   GLUCOSE 78  --  87 77     Liver Function Test:   Recent Labs     07/31/21 0831 08/01/21  0638   PROT 6.1* 5.5*   LABALBU 2.6* 2.3*   ALT 21 15   AST 33* 20   ALKPHOS 440* 358*   BILITOT 0.47 0.35     Coagulation Profile:   No results for input(s): INR, PROTIME, APTT in the last 72 hours. D-Dimer:  No results for input(s): DDIMER in the last 72 hours. Lactic Acid:  No results for input(s): LACTA in the last 72 hours. Cardiac Enzymes:  No results for input(s): CKTOTAL, CKMB, CKMBINDEX, TROPONINI in the last 72 hours. Invalid input(s): TROPONIN, HSTROP  BNP/ProBNP:   No results for input(s): BNP, PROBNP in the last 72 hours. Triglycerides:  No results for input(s): TRIG in the last 72 hours. Microbiology:  Urine Culture:  No components found for: CURINE  Blood Culture:  No components found for: CBLOOD, CFUNGUSBL  Sputum Culture:  No components found for: CSPUTUM  Recent Labs     07/30/21  1720   SPECDESC . THORACENTESIS FLUID   SPECIAL NOT REPORTED   CULTURE NO GROWTH 2 DAYS     Recent Labs     07/30/21  1720   SPECDESC . THORACENTESIS FLUID   SPECIAL NOT REPORTED   CULTURE NO GROWTH 2 DAYS        Pathology:    Radiology Reports:  MRI ABDOMEN W WO CONTRAST MRCP   Final Result   1.  Suggestion of interstitial edematous pancreatitis in the pancreatic tail,   consistent with acute on chronic pancreatitis given the CT appearance. 2. Approximately 13.8 cm x 6.5 cm x 2.3 cm heterogeneous collection in the   left subphrenic space. Moderate fat necrosis could have this appearance, but   the affected area is outside the pancreas with no findings of necrotizing   pancreatitis. Additionally, the finding was absent less than 4 weeks prior. There is suspicion for continuity of the lesion with the main pancreatic duct   in the tail, although this is incompletely evaluated due to only incomplete   inclusion of the affected area on the 3D MRCP sequence. This suggest the   possibility of fat necrosis from leaked pancreatic fluid. Abscess is an   additional consideration. 3. Mild dilation of the upstream pancreatic duct with abrupt caliber change   in the pancreatic neck potentially due to a filling defect such as   calcification as seen on CT or a stricture. There are no findings suggestive   of an obstructing malignancy. 4. Mild intrahepatic and extrahepatic biliary dilation of indeterminate   etiology with no findings of choledocholithiasis. Consider ERCP if there are   clinical findings of cholestasis. 5. Left para-aortic lymphadenopathy is likely reactive. Recommend attention   on follow-up imaging. US THORACENTESIS Which side should the procedure be performed? Left   Final Result   Successful ultrasound guided left thoracentesis. CT ABDOMEN PELVIS W IV CONTRAST Additional Contrast? Oral and Rectal   Final Result   Increasing multiloculated rim enhancing fluid collections in the left upper   quadrant. Slightly decreased size of the dominant pocket of fluid which now   contains a percutaneous drainage catheter, though there are innumerable new   smaller collections and other previously present smaller collections which   are increased in size. I suspect many of these do not communicate with the   pocket of fluid containing the drainage catheter.       Excellent opacification of the colon with enteric contrast without definitive   enteric contrast in the left upper quadrant collection to confirm a colonic   source. Pancreatic source is a possible alternative consideration in the   appropriate clinical setting. Persistent colonic wall thickening in the region of the hepatic flexure on a   background of probable ileus. Increased urinary bladder wall thickening with slightly increased perinephric   stranding. Correlate with urinary labs for contributory urinary tract   infection. XR CHEST (SINGLE VIEW FRONTAL)   Final Result   Pulmonary congestion with bibasilar effusions and adjacent infiltrates. IR GUIDED THORACENTESIS PLEURAL   Final Result   Successful ultrasound guided thoracentesis. CT ABSCESS DRAINAGE   Final Result   Successful percutaneous ultrasound and CT guided placement of 8 [place] of [place] left   upper quadrant drainage catheter. New high attenuation material within the multi loculated left upper quadrant   fluid collection suggests leakage from the adjacent abnormal colonic segment   indicating micro perforation. The above findings were called by Dr. Claribel Atwood MD to Dr. Reed Peters On 7/28/2021 at 12:32. CT ABDOMEN PELVIS W IV CONTRAST Additional Contrast? Radiologist Recommendation   Final Result   1. Large complex fluid collection in the left upper quadrant, concerning for   abscess. This is likely related to the previously described colitis. 2. Consolidation and ground-glass opacity in both lower lobes with left   pleural effusion, concerning for multifocal pneumonia. 3. Diffuse urinary bladder wall thickening. This could be due to   underdistention, but correlation for any signs or symptoms of cystitis is   recommended. 4. Chronic pancreatitis. 5. Nonobstructing left nephrolithiasis.          XR CHEST (SINGLE VIEW FRONTAL)   Final Result   Interval improvement in still mild interstitial edema with bilateral   asymmetric, left greater than right, pleural effusions         CT HEAD WO CONTRAST   Final Result   No acute intracranial abnormality. Suboccipital craniectomy. XR CHEST PORTABLE   Final Result   1. Increasing vascular congestion and left perihilar opacity. 2.  Pleural effusions and basilar opacities are otherwise without significant   change. XR ACUTE ABD SERIES CHEST 1 VW   Final Result   Bilateral airspace disease and pleural effusions. Findings in the upper   lobes appear increased compared to prior, left greater than right. Findings   may be related to asymmetric edema with superimposed pneumonia not excluded. Gas and stool are seen throughout nondilated bowel loops with few nonspecific   air-fluid levels in the right lower abdomen, likely in small bowel. Findings   may be physiologic or related to mild ileus. No evidence of obstruction or   free air. XR LUMBAR SPINE (2-3 VIEWS)   Final Result   Lumbar spine: Superior endplate fracture L5 which appears acute to subacute. No subluxation. Other vertebra well maintained. Spine significant compression deformity T6 with there are endplate loss in   height T8, T9 and T10. No subluxation. Mild levo scoliotic curvature. Osteopenia complicates assessment. Pedicles are intact. Low lung volumes   complicate assessment. There is suggestion of pleural effusions. XR THORACIC SPINE (3 VIEWS)   Final Result   Lumbar spine: Superior endplate fracture L5 which appears acute to subacute. No subluxation. Other vertebra well maintained. Spine significant compression deformity T6 with there are endplate loss in   height T8, T9 and T10. No subluxation. Mild levo scoliotic curvature. Osteopenia complicates assessment. Pedicles are intact. Low lung volumes   complicate assessment. There is suggestion of pleural effusions. MRI THORACIC SPINE WO CONTRAST   Final Result   1.  Limited examination. 2. Compression deformities involving T6, T8, T9 and T11. These are likely   chronic in nature. Diffusely decreased T1 and T2 marrow signal within the T6   vertebral body compatible with the sclerosis seen on the prior CT. 3. There is minimal bone marrow edema involving the left lateral elements of   T6 and T7. Unclear whether this is degenerative in nature or perhaps   sequelae of recent or remote trauma. 4. No significant spinal canal stenosis of the thoracic spine. 5. Moderate bilateral neural foraminal narrowing at T6-T7.   6. Bilateral pleural effusions, left greater than right. MRI LUMBAR SPINE WO CONTRAST   Final Result   1. Acute compression deformity of the superior endplate of L5 with   approximately 35% loss of height. No significant bulging of the posterior   cortex. 2. No significant spinal canal stenosis of the lumbar spine. 3. Neural foraminal narrowing at L3-L4 through L5-S1.   4. Minimal retrolisthesis at L5-S1. XR CHEST PORTABLE   Final Result   Interval development of bibasilar infiltrates and small pleural effusions   which could represent dependent edema, pneumonia or aspiration. XR ABDOMEN (KUB) (SINGLE AP VIEW)   Final Result   Mild dilation of the right hemicolon and patchy small bowel gas most likely   due to ileus. XR CHEST PORTABLE   Final Result   No radiologic evidence of acute cardiopulmonary disease. CT CHEST ABDOMEN PELVIS W CONTRAST   Final Result   Probable acute diverticulitis or colitis left colon. No evidence of   perforation or abscess at this time. Chronic pancreatitis. Multiple compression fractures some of which are new since the previous exam.      Lingular ground-glass infiltrate. Recommend follow-up to resolution. Intra-atrial lipoma right atrium. Cardiac echo may be helpful. CT LUMBAR SPINE TRAUMA RECONSTRUCTION   Final Result   CT thoracic spine:      1.   Decrease in height in T6 compared to prior study compatible with   worsening compression with subacute etiology and mild protrusion of the   dorsal aspect of T6. Narrowed T6-T7 neural foramina. 2.  Chronic superior height T8, T9, and T11 without interval change from   prior study. 3.  Mild prevertebral soft tissue prominence T6 without evidence of abscess   formation. CT lumbar spine:      1. No traumatic malalignment. 2.  Compression fracture superior L5 with subacute appearance. 3.  Calcifications in the abdomen incompletely included appearance suggesting   pancreatic calcifications. RECOMMENDATIONS:   Please reference CT chest, abdomen and pelvis of same day. CT THORACIC SPINE TRAUMA RECONSTRUCTION   Final Result   CT thoracic spine:      1. Decrease in height in T6 compared to prior study compatible with   worsening compression with subacute etiology and mild protrusion of the   dorsal aspect of T6. Narrowed T6-T7 neural foramina. 2.  Chronic superior height T8, T9, and T11 without interval change from   prior study. 3.  Mild prevertebral soft tissue prominence T6 without evidence of abscess   formation. CT lumbar spine:      1. No traumatic malalignment. 2.  Compression fracture superior L5 with subacute appearance. 3.  Calcifications in the abdomen incompletely included appearance suggesting   pancreatic calcifications. RECOMMENDATIONS:   Please reference CT chest, abdomen and pelvis of same day. XR THORACIC SPINE (2 VIEWS)    (Results Pending)   XR LUMBAR SPINE (2-3 VIEWS)    (Results Pending)        Echocardiogram:   Results for orders placed during the hospital encounter of 07/21/21    ECHO Complete 2D W Doppler W Color    Summary  Left ventricle is normal in size. Global left ventricular systolic function  is difficult to assess due to heart rate but appears normal. Calculated  ejection fraction 70% by Guadarrama's method.   Visually estimated EF 60-65%. Left atrium is normal in size. Lipomatous atrial septum. No shunt seen by color Doppler. Normal right ventricular size and function. No significant valvular regurgitation or stenosis seen. ASSESSMENT AND PLAN     Assessment:    // Acute hypoxic respiratory failure IMPROVED   // Severe sepsis IMPROVED   // Acute metabolic encephalopathy, improved  // Intra-abdominal abscess, s/p IR guided drainage 7/28, suspected pancreatic origin  // Left pleural effusion/compressive atelectasis, s/p thoracentesis, pleural fluid exudative, with neutrophilic predominance and elevated amylase levels  // COPD, severity to be determined  // Compression fracture of thoracic vertebrae  // Seizure disorder  // Essential hypertension  // Hyperlipidemia  // Chronic pancreatitis  // Alcohol abuse  // Tobacco abuse    Plan:    Continue Bronchodilators - Albuterol spiriva and SYMBICORT   ANTIBIOTICS PER ID   CLD          LEONIDES CORDOBA MD  Pulmonary and Critical Care Medicine           8/1/2021     Please note that this chart was generated using voice recognition Dragon dictation software. Although every effort was made to ensure the accuracy of this automated transcription, some errors in transcription may have occurred.

## 2021-08-01 NOTE — PROGRESS NOTES
Physical Therapy  Facility/Department: [facility] CAR 2  Daily Treatment Note  NAME: Travon Louise  : 1970  MRN: [id number]    Date of Service: 2021    Discharge Recommendations: Further therapy recommended at discharge. Assessment   Assessment: Pt min-CGA completing all functional mobility including gait trial 200 ft x1 w/RW displaying very slow pace and short step length. Pt will benefit from further therapy in IP setting and post d/c facilitating return to IND/safe functional mobility. Prognosis: Good  Decision Making: Medium Complexity  REQUIRES PT FOLLOW UP: Yes  Activity Tolerance  Activity Tolerance: Patient Tolerated treatment well;Patient limited by fatigue;Patient limited by endurance     Patient Diagnosis(es): The primary encounter diagnosis was Septicemia ([address] Diagnoses of Compression fracture of thoracic vertebra, initial encounter, unspecified thoracic vertebral level (HCC) and Compression fracture of lumbar vertebra, initial encounter, unspecified lumbar vertebral level ([address] were also pertinent to this visit. 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 has a past surgical history that includes Hysterectomy (); Brain tumor excision (); fracture surgery (Left, 7-3-13); fracture surgery (Right);  Upper gastrointestinal endoscopy (N/A, 10/22/2020); Colonoscopy (N/A, 10/22/2020); Endoscopic ultrasonography, GI (N/A, 12/9/2020); Upper gastrointestinal endoscopy (N/A, 4/5/2021); Hand surgery; and CT ABSCESS DRAINAGE (7/28/2021). Restrictions  Restrictions/Precautions  Restrictions/Precautions: Fall Risk, Up as Tolerated, Seizure  Required Braces or Orthoses?: No  Position Activity Restriction  Other position/activity restrictions: no activity restrictions per Rod Em with NS. Maintain SpO2 >94%, O2 NC 4 Lm. up with assist  Subjective   General  Family / Caregiver Present: No  Subjective  Subjective: RN and pt agreeable to PT. Pt alert in bed upon arrival.  Pain Screening  Patient Currently in Pain: Denies  Vital Signs  Patient Currently in Pain: Denies  Pre Treatment Pain Screening  Intervention List: Patient able to continue with treatment    Orientation  Overall Orientation Status: Within Normal Limits  Bed mobility  Scooting: Contact guard assistance  Transfers  Sit to Stand: Contact guard assistance  Stand to sit: Contact guard assistance  Comment: cues required on technique  Ambulation  Ambulation?: Yes  Ambulation 1  Surface: level tile  Device: Rolling Walker  Assistance: Minimal assistance;Contact guard assistance  Quality of Gait: slow pace, short step length, mild general unsteadiness  Distance: 200 ft x1  Comments: 2 brief standing rest breaks necessary 2* mild SOB, mm fatigue  Stairs/Curb  Stairs?: No     Balance  Posture: Fair  Sitting - Static: Good  Sitting - Dynamic: Good;-  Standing - Static: Fair;+  Standing - Dynamic: Fair  Comments: RW used while assessing standing balance      Comment: Encouraged increased mobility w/RN assist, pt educated regarding supine BLE ex's to gently build strength.       Goals  Short term goals  Time Frame for Short term goals: 14 visits  Short term goal 1: Pt will be S[place]ha bed mobility  Short term goal 2: Pt will be [place] transfers  Short term goal 3: Pt will be S[place]ha amb 200' rW or least restrictive AD  Short

## 2021-08-01 NOTE — PROGRESS NOTES
[facility] -   Infectious diseases evaluation  admission date 7/21/2021    reason for consultation:   Diverticulosis/ leukocytosis and rash    Impression :   Current:  · bandemia  · Acute diverticulitis or L colitis  · Acute on chronic Pancreatitis of the tail w surrounding peripanc fluid collections and suspected pseudocysts  · Peripancreatic abd bloody fluid collection - drain placed 7/28 cx neg  · Lingular infiltrate  · Left large pleural effusion, reactive and cx neg 7/28   · Lactic acidosis  · 7/21/21 new Skin rash on the back - livedo reticularis   · CRP elevation  · Anxiety    Other:  · Copd w prednisone  · Hx astrocytoma age 24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 recurrent -  · SZ on tx  · Osteoporosis  · Migraine headache   · Chronic pancreatitis  Discussion / summary of stay / plan of care   ·   Recommendations   · zosyn -7/21 till 7/31 - stop   · pancreatitis w many bella panc fluid collections and maybe pseudocysts  · Disc w GI reg the drain of the abd, planning for ERCP w panc stent and divert the pancreatic fluid and improve the outpt of the abd fluid collections  · Hoping to remove the abd drain if the drainage improves    Infection Control Recommendations   · [place] Precautions    Antimicrobial Stewardship Recommendations   · Simplification of therapy  · Targeted therapy    Coordination ofOutpatient Care:   · Estimated Length of IV antimicrobials:  · Patient will need Midline / picc Catheter Insertion:   · Patient will need SNF:  · Patient will need outpatient wound care:     History of Present Illness:   Initial history:  Daphney Alcaraz is a 45y.o.-year-old female w abd  And back pain and hospital and found T spine cp fracture acute and sub acute, no sx plans,   Found w lingular infiltrate and started on zosyn, for presumed aspiration pneumonia.   Also on CTAP left diverticulitis and no abscess or perforation seen, but surround left colitis seen as well.    Started on zosyn since 7/21 and eating and feels better,m still a little tired and sleepy but eating. leukocytosis still elevated while getting steroids for COPD. Rash on the back noticed since prior to admission, unclear cause-    ID called for the leukocytosis and rash. On off - unclear if present x long time PTA since she lives by herself and the rash is only on the back and post right arm. She had fevers at admission and is down to LGF since    On exam she is very anxious and ox 3 - easily emotional              Interval changes  8/1/2021   Patient Vitals for the past 8 hrs:   BP Temp Temp src Pulse Resp SpO2   08/01/21 1646 -- -- -- -- 27 98 %   08/01/21 1643 -- -- -- 101 24 99 %   08/01/21 1615 (!) 142/94 98.6 °F (37 °C) Oral 103 18 92 %   08/01/21 1611 (!) 143/100 -- -- 102 25 95 %   08/01/21 1121 108/83 98.6 °F (37 °C) Oral 102 21 95 %     T max 101 on 7/28 -  resolved since  abd soft and FRANSISCO w bloody fluid, likely from pancreatitis  cx from the fluid is neg    MRI abd 7/31 shows panc tail pancreatitis, acute on chronic, w surrounding fluid collections. CT AP 7/30. No intest perforation and abscesses are larger and increased in number. CT AP show 7/27 left abd collection, and left pleura moderate size effusion - GS note appreciated    IR drainage: 7/28/21  · Pleural: PH   7.5, prot 3.6, LDH 2125, amylase 4100, WBC <3000, RBC 2125. cx pend neg    · abd fluid collection drain placed and fluid is serosang, , RBC 87999, amylase 404, ,  cx pend neg    Labs reviewed    8/1 GI note reviewed  GS  8/1 requesting ERCP? Summary of relevant labs:  Labs:  W 19 - 17 - 19 - 17 - 20 - 25 - 18  Creat  0.68   - 269 - 291  Procalcitonin0.16   Lactic Acid 8.7    Micro:   BC 7/27    Imaging:  CT AP 7/30/21  Increasing multiloculated rim enhancing fluid collections in the left upper   quadrant.   Slightly decreased size of the dominant pocket of fluid which now   contains a percutaneous drainage catheter, though there are innumerable new   smaller collections and other previously present smaller collections which   are increased in size. I suspect many of these do not communicate with the   pocket of fluid containing the drainage catheter. Excellent opacification of the colon with enteric contrast without definitive   enteric contrast in the left upper quadrant collection to confirm a colonic   source. Pancreatic source is a possible alternative consideration in the   appropriate clinical setting. Persistent colonic wall thickening in the region of the hepatic flexure on a   background of probable ileus. Increased urinary bladder wall thickening with slightly increased perinephric   stranding. Correlate with urinary labs for contributory urinary tract   infection. CXR 7/27  Interval improvement in still mild interstitial edema with bilateral   asymmetric, left greater than right, pleural effusions     CT AP 7/27  Large complex fluid collection in the left upper quadrant, concerning for   abscess. This is likely related to the previously described colitis. 2. Consolidation and ground-glass opacity in both lower lobes with left   pleural effusion, concerning for multifocal pneumonia. 3. Diffuse urinary bladder wall thickening. This could be due to   underdistention, but correlation for any signs or symptoms of cystitis is   recommended. 4. Chronic pancreatitis. 5. Nonobstructing left nephrolithiasis. CT head 7/25  No acute intracranial abnormality. Suboccipital craniectomy. CT AP 7/21   Probable acute diverticulitis or colitis left colon. No evidence of perforation or abscess at this time. Chronic pancreatitis. Multiple compression fractures some of which are new since the previous exam.   Lingular ground-glass infiltrate. Recommend follow-up to resolution. Intra-atrial lipoma right atrium. Cardiac echo may be helpful.            I have personally reviewed the past medical history, past surgical history, medications, social history, and family history, and I haveupdated the database accordingly. Allergies:   Patient has no known allergies. Review of Systems:     Review of Systems   Constitutional: Negative for activity change, appetite change, chills, diaphoresis and fatigue. HENT: Negative for congestion and rhinorrhea. Eyes: Negative for photophobia, pain, discharge and visual disturbance. Respiratory: Negative for apnea. Cardiovascular: Negative for chest pain and leg swelling. Gastrointestinal: Positive for abdominal pain. Negative for abdominal distention. Endocrine: Negative for heat intolerance and polyphagia. Genitourinary: Negative for dysuria and flank pain. Musculoskeletal: Negative for arthralgias. Skin: Positive for rash. Negative for color change. Allergic/Immunologic: Negative for immunocompromised state. Neurological: Positive for tremors. Negative for dizziness, seizures, speech difficulty and headaches. Hematological: Negative for adenopathy. Psychiatric/Behavioral: Negative for agitation. The patient is not nervous/anxious. Physical Examination :       Physical Exam  Constitutional:       General: She is not in acute distress. Appearance: Normal appearance. She is not ill-appearing, toxic-appearing or diaphoretic. HENT:      Head: Normocephalic and atraumatic. Nose: Nose normal.      Mouth/Throat:      Mouth: Mucous membranes are moist.   Eyes:      General: No scleral icterus. Pupils: Pupils are equal, round, and reactive to light. Cardiovascular:      Rate and Rhythm: Normal rate and regular rhythm. Heart sounds: Normal heart sounds. No murmur heard. No friction rub. No gallop. Pulmonary:      Effort: No respiratory distress. Breath sounds: Normal breath sounds. No stridor. No rhonchi. Abdominal:      General: There is no distension. Palpations: Abdomen is soft. pulmonology-Dr [facility]as[facility]-last virtual visit feb 2021    Under care of team 07/01/2021    psych-bahnfeldt NP-telemed-last visit may 2021    Under care of team 07/01/2021    oq[address]-last visit june 2021    Wellness examination 07/01/2021    pcp-L[address]-last visit may 2021       Past Surgical  History:     Past Surgical History:   Procedure Laterality Date    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    COLONOSCOPY N/A 10/22/2020    COLONOSCOPY DIAGNOSTIC performed by Reji Ackerman MD at [facility]    [address]  7/28/2021    CT ABSCESS DRAIN SUBCUTANEOUS 7/28/2021 [facility] CT SCAN    ENDOSCOPIC ULTRASOUND (LOWER) N/A 12/9/2020    ENDOSCOPIC ULTRASOUND, UPPER WITH LINEAR SCOPE FOR BIOPSY OF MASS ON HEAD OF PANCREAS performed by Juan M Perez MD at [address] Left 7-3-13    ORIF tibial plateau    FRACTURE SURGERY Right     small finger metacarpal fracture    HAND SURGERY      pins    HYSTERECTOMY  2003    UPPER GASTROINTESTINAL ENDOSCOPY N/A 10/22/2020    EGD BIOPSY performed by Reji Ackerman MD at [address] [place] [address] N/A 4/5/2021    EGD BIOPSY performed by Reji Ackerman MD at [facility]       Medications:      tiotropium  2 puff Inhalation Daily    sodium chloride flush  5-40 mL Intravenous BID    pantoprazole  40 mg Oral QAM AC    guaiFENesin  600 mg Oral BID    nicotine  1 patch Transdermal Daily    lidocaine  1 patch Transdermal Daily    spironolactone  50 mg Oral Daily    polyethylene glycol  17 g Oral BID    carBAMazepine  200 mg Oral TID    topiramate  50 mg Oral BID    amLODIPine  5 mg Oral Daily    baclofen  10 mg Oral TID    budesonide-formoterol  2 puff Inhalation BID    busPIRone  15 mg Oral TID    ferrous sulfate  325 mg Oral BID    folic acid  1 mg Oral Daily    potassium chloride  20 mEq Oral Daily with breakfast    pregabalin  200 mg Oral TID   [place] rOPINIRole  1 mg Oral Nightly    sodium chloride flush  5-40 mL Intravenous 2 times per day    enoxaparin  40 mg Subcutaneous Daily       Social History:     Social History     Socioeconomic History    Marital status: Single     Spouse name: Not on file    Number of children: Not on file    Years of education: Not on file    Highest education level: Not on file   Occupational History    Not on file   Tobacco Use    Smoking status: Current Every Day Smoker     Packs/day: 0.50     Years: 30.00     Pack years: 15.00     Types: Cigarettes    Smokeless tobacco: Never Used    Tobacco comment: plans on quitting in the future    Vaping Use    Vaping Use: Never used   Substance and Sexual Activity    Alcohol use: Not Currently     Alcohol/week: 0.0 standard drinks    Drug use: Yes     Frequency: 2.0 times per week     Types: Marijuana    Sexual activity: Not on file   Other Topics Concern    Not on file   Social History Narrative    Not on file     Social Determinants of Health     Financial Resource Strain: Medium Risk    Difficulty of Paying Living Expenses: Somewhat hard   Food Insecurity: No Food Insecurity    Worried About Running Out of Food in the Last Year: Never true    [place] of Food in the Last Year: Never true   Transportation Needs:     Lack of Transportation (Medical):      Lack of Transportation (Non-Medical):    Physical Activity:     Days of Exercise per Week:     Minutes of Exercise per Session:    Stress:     Feeling of Stress :    Social Connections:     Frequency of Communication with Friends and Family:     Frequency of Social Gatherings with Friends and Family:     Attends Buddhist Services:     Active Member of Clubs or Organizations:     Attends Club or Organization Meetings:     Marital Status:    Intimate Partner Violence:     Fear of Current or Ex-Partner:     Emotionally Abused:     Physically Abused:     Sexually Abused:        Family History:     Family History   Problem Relation Age of Onset    Other Father     Cancer Maternal Grandmother     Heart Disease Paternal Grandmother     Esophageal Cancer Maternal Aunt       Medical Decision Making:   I have independently reviewed/ordered the following labs:    CBC with Differential:   Recent Labs     07/31/21 0831 08/01/21  0638   WBC 24.9* 18.1*   HGB 9.8* 9.0*   HCT 31.0* 27.8*   * 474*   LYMPHOPCT 10* 11*   MONOPCT 8 8     BMP:  Recent Labs     07/30/21  0608 07/30/21  1850 07/30/21 2015 07/31/21  0831 08/01/21  0638   NA   < >  --   --  139 139   K   < >  --  3.9 4.2 3.4*   CL   < >  --   --  100 100   CO2   < >  --   --  24 24   BUN   < >  --   --  5* 4*   CREATININE   < >  --   --  0.50 0.36*   MG  --  1.9 1.9  --   --     < > = values in this interval not displayed. Hepatic Function Panel:   Recent Labs     07/31/21  0831 08/01/21  0638   PROT 6.1* 5.5*   LABALBU 2.6* 2.3*   BILIDIR 0.31* 0.22   IBILI 0.16 0.13   BILITOT 0.47 0.35   ALKPHOS 440* 358*   ALT 21 15   AST 33* 20     No results for input(s): RPR in the last 72 hours. No results for input(s): HIV in the last 72 hours. No results for input(s): BC in the last 72 hours. Lab Results   Component Value Date    CREATININE 0.36 08/01/2021    GLUCOSE 77 08/01/2021    GLUCOSE 92 04/30/2012       Detailed results: Thank you for allowing us to participate in the care of this patient. Please call with questions. This note is created with the assistance of a speech recognition program.  While intending to generate adocument that actually reflects the content of the visit, the document can still have some errors including those of syntax and sound a like substitutions which may escape proof reading. It such instances, actual meaningcan be extrapolated by contextual diversion.     Vandana Mahoney MD  Office: [phone]  Perfect serve / office [phone]

## 2021-08-02 ENCOUNTER — APPOINTMENT (OUTPATIENT)
Dept: GENERAL RADIOLOGY | Age: 51
DRG: 720 | End: 2021-08-02
Payer: MEDICARE

## 2021-08-02 LAB
ABSOLUTE EOS #: 0.05 K/UL (ref 0–0.44)
ABSOLUTE IMMATURE GRANULOCYTE: 0.33 K/UL (ref 0–0.3)
ABSOLUTE LYMPH #: 2.09 K/UL (ref 1.1–3.7)
ABSOLUTE MONO #: 1.16 K/UL (ref 0.1–1.2)
ANION GAP SERPL CALCULATED.3IONS-SCNC: 12 MMOL/L (ref 9–17)
BASOPHILS # BLD: 0 % (ref 0–2)
BASOPHILS ABSOLUTE: 0.03 K/UL (ref 0–0.2)
BUN BLDV-MCNC: 3 MG/DL (ref 6–20)
BUN/CREAT BLD: ABNORMAL (ref 9–20)
CALCIUM SERPL-MCNC: 8.5 MG/DL (ref 8.6–10.4)
CHLORIDE BLD-SCNC: 102 MMOL/L (ref 98–107)
CO2: 24 MMOL/L (ref 20–31)
CREAT SERPL-MCNC: 0.38 MG/DL (ref 0.5–0.9)
CULTURE: NORMAL
CULTURE: NORMAL
DIFFERENTIAL TYPE: ABNORMAL
DIRECT EXAM: NORMAL
DIRECT EXAM: NORMAL
EOSINOPHILS RELATIVE PERCENT: 0 % (ref 1–4)
GFR AFRICAN AMERICAN: >60 ML/MIN
GFR NON-AFRICAN AMERICAN: >60 ML/MIN
GFR SERPL CREATININE-BSD FRML MDRD: ABNORMAL ML/MIN/{1.73_M2}
GFR SERPL CREATININE-BSD FRML MDRD: ABNORMAL ML/MIN/{1.73_M2}
GLUCOSE BLD-MCNC: 129 MG/DL (ref 70–99)
HCT VFR BLD CALC: 29.2 % (ref 36.3–47.1)
HEMOGLOBIN: 9.1 G/DL (ref 11.9–15.1)
IMMATURE GRANULOCYTES: 2 %
INTERVENTION: NORMAL
LYMPHOCYTES # BLD: 14 % (ref 24–43)
Lab: NORMAL
Lab: NORMAL
MCH RBC QN AUTO: 29.2 PG (ref 25.2–33.5)
MCHC RBC AUTO-ENTMCNC: 31.2 G/DL (ref 28.4–34.8)
MCV RBC AUTO: 93.6 FL (ref 82.6–102.9)
MONOCYTES # BLD: 8 % (ref 3–12)
NRBC AUTOMATED: 0 PER 100 WBC
PDW BLD-RTO: 13 % (ref 11.8–14.4)
PLATELET # BLD: 505 K/UL (ref 138–453)
PLATELET ESTIMATE: ABNORMAL
PMV BLD AUTO: 9.8 FL (ref 8.1–13.5)
POTASSIUM SERPL-SCNC: 3.7 MMOL/L (ref 3.7–5.3)
RBC # BLD: 3.12 M/UL (ref 3.95–5.11)
RBC # BLD: ABNORMAL 10*6/UL
SEG NEUTROPHILS: 76 % (ref 36–65)
SEGMENTED NEUTROPHILS ABSOLUTE COUNT: 11.41 K/UL (ref 1.5–8.1)
SODIUM BLD-SCNC: 138 MMOL/L (ref 135–144)
SPECIMEN DESCRIPTION: NORMAL
SPECIMEN DESCRIPTION: NORMAL
WBC # BLD: 15.1 K/UL (ref 3.5–11.3)
WBC # BLD: ABNORMAL 10*3/UL

## 2021-08-02 PROCEDURE — 71045 X-RAY EXAM CHEST 1 VIEW: CPT

## 2021-08-02 PROCEDURE — 36415 COLL VENOUS BLD VENIPUNCTURE: CPT

## 2021-08-02 PROCEDURE — 2700000000 HC OXYGEN THERAPY PER DAY

## 2021-08-02 PROCEDURE — 97530 THERAPEUTIC ACTIVITIES: CPT

## 2021-08-02 PROCEDURE — 6370000000 HC RX 637 (ALT 250 FOR IP): Performed by: STUDENT IN AN ORGANIZED HEALTH CARE EDUCATION/TRAINING PROGRAM

## 2021-08-02 PROCEDURE — 94761 N-INVAS EAR/PLS OXIMETRY MLT: CPT

## 2021-08-02 PROCEDURE — 6360000002 HC RX W HCPCS: Performed by: STUDENT IN AN ORGANIZED HEALTH CARE EDUCATION/TRAINING PROGRAM

## 2021-08-02 PROCEDURE — 6360000002 HC RX W HCPCS: Performed by: NURSE PRACTITIONER

## 2021-08-02 PROCEDURE — 6370000000 HC RX 637 (ALT 250 FOR IP): Performed by: NURSE PRACTITIONER

## 2021-08-02 PROCEDURE — 97535 SELF CARE MNGMENT TRAINING: CPT

## 2021-08-02 PROCEDURE — 94640 AIRWAY INHALATION TREATMENT: CPT

## 2021-08-02 PROCEDURE — 99232 SBSQ HOSP IP/OBS MODERATE 35: CPT | Performed by: INTERNAL MEDICINE

## 2021-08-02 PROCEDURE — 2580000003 HC RX 258: Performed by: STUDENT IN AN ORGANIZED HEALTH CARE EDUCATION/TRAINING PROGRAM

## 2021-08-02 PROCEDURE — 99232 SBSQ HOSP IP/OBS MODERATE 35: CPT | Performed by: NURSE PRACTITIONER

## 2021-08-02 PROCEDURE — 85025 COMPLETE CBC W/AUTO DIFF WBC: CPT

## 2021-08-02 PROCEDURE — 6370000000 HC RX 637 (ALT 250 FOR IP): Performed by: PHYSICIAN ASSISTANT

## 2021-08-02 PROCEDURE — 2060000000 HC ICU INTERMEDIATE R&B

## 2021-08-02 PROCEDURE — 80048 BASIC METABOLIC PNL TOTAL CA: CPT

## 2021-08-02 PROCEDURE — 6370000000 HC RX 637 (ALT 250 FOR IP): Performed by: FAMILY MEDICINE

## 2021-08-02 PROCEDURE — 97110 THERAPEUTIC EXERCISES: CPT

## 2021-08-02 PROCEDURE — 97116 GAIT TRAINING THERAPY: CPT

## 2021-08-02 PROCEDURE — 2580000003 HC RX 258: Performed by: NURSE PRACTITIONER

## 2021-08-02 RX ORDER — BUSPIRONE HYDROCHLORIDE 10 MG/1
20 TABLET ORAL 3 TIMES DAILY
Status: DISCONTINUED | OUTPATIENT
Start: 2021-08-02 | End: 2021-08-03 | Stop reason: HOSPADM

## 2021-08-02 RX ADMIN — HYDROXYZINE HYDROCHLORIDE 25 MG: 25 TABLET ORAL at 18:14

## 2021-08-02 RX ADMIN — FENTANYL CITRATE 25 MCG: 50 INJECTION, SOLUTION INTRAMUSCULAR; INTRAVENOUS at 22:18

## 2021-08-02 RX ADMIN — BUDESONIDE AND FORMOTEROL FUMARATE DIHYDRATE 2 PUFF: 160; 4.5 AEROSOL RESPIRATORY (INHALATION) at 20:31

## 2021-08-02 RX ADMIN — TOPIRAMATE 50 MG: 25 TABLET, FILM COATED ORAL at 09:21

## 2021-08-02 RX ADMIN — SODIUM CHLORIDE, PRESERVATIVE FREE 10 ML: 5 INJECTION INTRAVENOUS at 22:17

## 2021-08-02 RX ADMIN — ROPINIROLE HYDROCHLORIDE 1 MG: 1 TABLET, FILM COATED ORAL at 22:17

## 2021-08-02 RX ADMIN — PANTOPRAZOLE SODIUM 40 MG: 40 TABLET, DELAYED RELEASE ORAL at 06:29

## 2021-08-02 RX ADMIN — SODIUM CHLORIDE, PRESERVATIVE FREE 10 ML: 5 INJECTION INTRAVENOUS at 22:18

## 2021-08-02 RX ADMIN — SODIUM CHLORIDE, PRESERVATIVE FREE 5 ML: 5 INJECTION INTRAVENOUS at 09:21

## 2021-08-02 RX ADMIN — SPIRONOLACTONE 50 MG: 25 TABLET ORAL at 09:19

## 2021-08-02 RX ADMIN — FERROUS SULFATE TAB EC 325 MG (65 MG FE EQUIVALENT) 325 MG: 325 (65 FE) TABLET DELAYED RESPONSE at 22:16

## 2021-08-02 RX ADMIN — BUSPIRONE HYDROCHLORIDE 20 MG: 10 TABLET ORAL at 22:16

## 2021-08-02 RX ADMIN — FENTANYL CITRATE 50 MCG: 50 INJECTION, SOLUTION INTRAMUSCULAR; INTRAVENOUS at 06:58

## 2021-08-02 RX ADMIN — AMLODIPINE BESYLATE 5 MG: 5 TABLET ORAL at 09:18

## 2021-08-02 RX ADMIN — CARBAMAZEPINE 200 MG: 200 TABLET ORAL at 22:16

## 2021-08-02 RX ADMIN — BUSPIRONE HYDROCHLORIDE 15 MG: 15 TABLET ORAL at 09:19

## 2021-08-02 RX ADMIN — TIOTROPIUM BROMIDE INHALATION SPRAY 2 PUFF: 3.12 SPRAY, METERED RESPIRATORY (INHALATION) at 07:58

## 2021-08-02 RX ADMIN — NICOTINE POLACRILEX 2 MG: 2 GUM, CHEWING BUCCAL at 17:10

## 2021-08-02 RX ADMIN — FOLIC ACID 1 MG: 1 TABLET ORAL at 09:17

## 2021-08-02 RX ADMIN — BUDESONIDE AND FORMOTEROL FUMARATE DIHYDRATE 2 PUFF: 160; 4.5 AEROSOL RESPIRATORY (INHALATION) at 07:58

## 2021-08-02 RX ADMIN — GUAIFENESIN 600 MG: 600 TABLET, EXTENDED RELEASE ORAL at 09:19

## 2021-08-02 RX ADMIN — CARBAMAZEPINE 200 MG: 200 TABLET ORAL at 09:16

## 2021-08-02 RX ADMIN — TOPIRAMATE 50 MG: 25 TABLET, FILM COATED ORAL at 22:18

## 2021-08-02 RX ADMIN — FERROUS SULFATE TAB EC 325 MG (65 MG FE EQUIVALENT) 325 MG: 325 (65 FE) TABLET DELAYED RESPONSE at 09:17

## 2021-08-02 RX ADMIN — HYDROXYZINE HYDROCHLORIDE 25 MG: 25 TABLET ORAL at 07:04

## 2021-08-02 RX ADMIN — SODIUM CHLORIDE, POTASSIUM CHLORIDE, SODIUM LACTATE AND CALCIUM CHLORIDE: 600; 310; 30; 20 INJECTION, SOLUTION INTRAVENOUS at 09:36

## 2021-08-02 RX ADMIN — ENOXAPARIN SODIUM 40 MG: 40 INJECTION SUBCUTANEOUS at 09:19

## 2021-08-02 RX ADMIN — BACLOFEN 10 MG: 10 TABLET ORAL at 09:17

## 2021-08-02 RX ADMIN — FENTANYL CITRATE 50 MCG: 50 INJECTION, SOLUTION INTRAMUSCULAR; INTRAVENOUS at 11:51

## 2021-08-02 RX ADMIN — PREGABALIN 200 MG: 100 CAPSULE ORAL at 22:17

## 2021-08-02 RX ADMIN — BUSPIRONE HYDROCHLORIDE 20 MG: 10 TABLET ORAL at 15:21

## 2021-08-02 RX ADMIN — PREGABALIN 200 MG: 100 CAPSULE ORAL at 15:20

## 2021-08-02 RX ADMIN — FENTANYL CITRATE 25 MCG: 50 INJECTION, SOLUTION INTRAMUSCULAR; INTRAVENOUS at 18:14

## 2021-08-02 RX ADMIN — FENTANYL CITRATE 50 MCG: 50 INJECTION, SOLUTION INTRAMUSCULAR; INTRAVENOUS at 02:27

## 2021-08-02 RX ADMIN — BACLOFEN 10 MG: 10 TABLET ORAL at 22:16

## 2021-08-02 RX ADMIN — POTASSIUM CHLORIDE 20 MEQ: 1500 TABLET, EXTENDED RELEASE ORAL at 09:20

## 2021-08-02 RX ADMIN — BACLOFEN 10 MG: 10 TABLET ORAL at 15:20

## 2021-08-02 RX ADMIN — CARBAMAZEPINE 200 MG: 200 TABLET ORAL at 15:20

## 2021-08-02 RX ADMIN — GUAIFENESIN 600 MG: 600 TABLET, EXTENDED RELEASE ORAL at 22:17

## 2021-08-02 RX ADMIN — FENTANYL CITRATE 50 MCG: 50 INJECTION, SOLUTION INTRAMUSCULAR; INTRAVENOUS at 15:26

## 2021-08-02 RX ADMIN — PREGABALIN 200 MG: 100 CAPSULE ORAL at 09:16

## 2021-08-02 ASSESSMENT — ENCOUNTER SYMPTOMS
ABDOMINAL DISTENTION: 0
EYE DISCHARGE: 0
PHOTOPHOBIA: 0
RHINORRHEA: 0
ABDOMINAL PAIN: 0
ABDOMINAL PAIN: 1
NAUSEA: 0
EYE PAIN: 0
WHEEZING: 0
COUGH: 0
COLOR CHANGE: 0
COUGH: 1
APNEA: 0
CHOKING: 0
SHORTNESS OF BREATH: 1
DIARRHEA: 0

## 2021-08-02 ASSESSMENT — PAIN SCALES - GENERAL
PAINLEVEL_OUTOF10: 7
PAINLEVEL_OUTOF10: 5
PAINLEVEL_OUTOF10: 0
PAINLEVEL_OUTOF10: 7
PAINLEVEL_OUTOF10: 6
PAINLEVEL_OUTOF10: 7
PAINLEVEL_OUTOF10: 0
PAINLEVEL_OUTOF10: 5
PAINLEVEL_OUTOF10: 5

## 2021-08-02 ASSESSMENT — PAIN DESCRIPTION - PAIN TYPE
TYPE: ACUTE PAIN
TYPE: ACUTE PAIN;CHRONIC PAIN

## 2021-08-02 ASSESSMENT — PAIN DESCRIPTION - LOCATION
LOCATION: ABDOMEN
LOCATION: BACK

## 2021-08-02 ASSESSMENT — PAIN DESCRIPTION - ORIENTATION
ORIENTATION: LEFT;LOWER
ORIENTATION: LEFT

## 2021-08-02 NOTE — PROGRESS NOTES
PULMONARY & CRITICAL CARE MEDICINE PROGRESS NOTE     Patient:  Amarilis Pedersen  MRN: [id number]  Admit date: 2021  Primary Care Physician: Alex Adams MD  Consulting Physician: Jarret Mujica MD  CODE Status: Full Code  LOS: 12     SUBJECTIVE     CHIEF COMPLAINT/REASON FOR INITIAL CONSULT: Acute respiratory failuree respiratory failure  BRIEF HOSPITAL COURSE:   The patient is a 45 y.o. female admitted with abdominal and back pain. She was found to have thoracic spine fracture. Her CT abdomen done on  showed a complex fluid collection 9.3 x 7.3 x 5.4 cm in the left upper quadrant and left lower lobe consolidation/pleural effusion. INTERVAL HISTORY:  21  NO ACUTE COMP  NO WHEEZE   Diet advanced to soft diet  She will require supplemental oxygen because desaturates when oxygen removed  X-ray chest shows left small effusion and right basilar atelectasis  REVIEW OF SYSTEMS:  Review of Systems   Constitutional: Positive for fatigue. Negative for fever. HENT: Negative. Respiratory: Positive for cough and shortness of breath. Negative for wheezing. Cardiovascular: Negative. Gastrointestinal: Negative for abdominal pain, diarrhea and nausea. Neurological: Negative. OBJECTIVE     PaO2/FiO2 RATIO:  No results for input(s): POCPO2 in the last 72 hours.    FiO2 : (S) 50 %     VITAL SIGNS:   LAST:  /89   Pulse 104   Temp 98.9 °F (37.2 °C) (Oral)   Resp (!) 31   Ht 5' 5\" (1.651 m)   Wt 142 lb 9.6 oz (64.7 kg)   SpO2 93%   BMI 23.73 kg/m²   8-24 HR RANGE:  TEMP Temp  Av.5 °F (36.9 °C)  Min: 98.1 °F (36.7 °C)  Max: 98.9 °F (36.9 °C)   BP Systolic (24XJS), CQW:016 , Min:108 , LPX:192      Diastolic (65ZEO), CFB:26, Min:83, Max:101     PULSE Pulse  Av.8  Min: 85  Max: 105   RR Resp  Av.1  Min: 21  Max: 31   O2 SAT SpO2  Av.5 %  Min: 83 %  Max: 93 %   OXYGEN DELIVERY O2 Flow Rate (L/min)  Avg: 3.5 L/min  Min: 2 L/min  Max: 4 L/min        SYSTEMIC EXAMINATION:   General appearance - Ill-appearing, mild  respiratory distress  Mental status - awake & alert, follows commands  Eyes - pupils equal and reactive, sclera anicteric  Mouth - mucous membranes moist  Neck - supple, no significant adenopathy, carotids upstroke normal bilaterally, no bruits  Chest - Breath sounds bilaterally were dimnished to auscultation at bases  Heart - normal rate, regular rhythm, normal S1, S2, no murmurs, rubs, clicks or gallops  Abdomen - soft, left upper quadrant tenderness WITH DRAIN , no masses or organomegaly  Neurological - non-focal  Extremities - peripheral pulses normal, no pedal edema, no clubbing or cyanosis  Skin - normal coloration and turgor, no rashes, no suspicious skin lesions noted     DATA REVIEW     Medications: Current Inpatient  Scheduled Meds:   busPIRone  20 mg Oral TID    tiotropium  2 puff Inhalation Daily    sodium chloride flush  5-40 mL Intravenous BID    pantoprazole  40 mg Oral QAM AC    guaiFENesin  600 mg Oral BID    nicotine  1 patch Transdermal Daily    lidocaine  1 patch Transdermal Daily    spironolactone  50 mg Oral Daily    polyethylene glycol  17 g Oral BID    carBAMazepine  200 mg Oral TID    topiramate  50 mg Oral BID    amLODIPine  5 mg Oral Daily    baclofen  10 mg Oral TID    budesonide-formoterol  2 puff Inhalation BID    ferrous sulfate  325 mg Oral BID    folic acid  1 mg Oral Daily    potassium chloride  20 mEq Oral Daily with breakfast    pregabalin  200 mg Oral TID    rOPINIRole  1 mg Oral Nightly    sodium chloride flush  5-40 mL Intravenous 2 times per day    enoxaparin  40 mg Subcutaneous Daily     Continuous Infusions:   lactated ringers 100 mL/hr at 08/02/21 0936    sodium chloride 25 mL (07/24/21 1803)       INPUT/OUTPUT:  In: 7217 [P.O.:927;  I.V.:2452]  Out: 9969 [Urine:2600; Drains:10]  Date 08/02/21 0000 - 08/02/21 2359   Shift 9604-9243 6244-1430 9306-7966 24 Hour Total   INTAKE   P.O.(mL/kg/hr)  927 927   I.V.(mL/kg) 1210(18.7)   1210(18.7)   Shift Total(mL/kg) 5038(39.2) 927(14.3)  2006(40)   OUTPUT   Urine(mL/kg/hr) 700(1.4) 750  1450   Shift Total(mL/kg) 700(10.8) 750(11.6)  1450(22.4)   Weight (kg) 64.7 64.7 64.7 64.7        LABS:  ABGs:   No results for input(s): POCPH, POCPCO2, POCPO2, POCHCO3, MFRC7MLB in the last 72 hours. CBC:   Recent Labs     07/31/21 0831 08/01/21 0638 08/02/21  0349   WBC 24.9* 18.1* 15.1*   HGB 9.8* 9.0* 9.1*   HCT 31.0* 27.8* 29.2*   MCV 92.3 91.7 93.6   * 474* 505*   LYMPHOPCT 10* 11* 14*   RBC 3.36* 3.03* 3.12*   MCH 29.2 29.7 29.2   MCHC 31.6 32.4 31.2   RDW 13.1 13.1 13.0     CRP:   No results for input(s): CRP in the last 72 hours. LDH:   No results for input(s): LDH in the last 72 hours. BMP:   Recent Labs     07/30/21 2015 07/31/21 0831 08/01/21 0638 08/02/21  0349   NA  --  139 139 138   K 3.9 4.2 3.4* 3.7   CL  --  100 100 102   CO2  --  24 24 24   BUN  --  5* 4* 3*   CREATININE  --  0.50 0.36* 0.38*   GLUCOSE  --  87 77 129*     Liver Function Test:   Recent Labs     07/31/21 0831 08/01/21  0638   PROT 6.1* 5.5*   LABALBU 2.6* 2.3*   ALT 21 15   AST 33* 20   ALKPHOS 440* 358*   BILITOT 0.47 0.35     Coagulation Profile:   No results for input(s): INR, PROTIME, APTT in the last 72 hours. D-Dimer:  No results for input(s): DDIMER in the last 72 hours. Lactic Acid:  No results for input(s): LACTA in the last 72 hours. Cardiac Enzymes:  No results for input(s): CKTOTAL, CKMB, CKMBINDEX, TROPONINI in the last 72 hours. Invalid input(s): TROPONIN, HSTROP  BNP/ProBNP:   No results for input(s): BNP, PROBNP in the last 72 hours. Triglycerides:  No results for input(s): TRIG in the last 72 hours. Microbiology:  Urine Culture:  No components found for: CURINE  Blood Culture:  No components found for: CBLOOD, CFUNGUSBL  Sputum Culture:  No components found for: CSPUTUM  Recent Labs     07/30/21  1720   SPECDESC . THORACENTESIS FLUID   SPECIAL NOT REPORTED CULTURE NO GROWTH 3 DAYS     Recent Labs     07/30/21  1720   SPECDESC . THORACENTESIS FLUID   SPECIAL NOT REPORTED   CULTURE NO GROWTH 3 DAYS        Pathology:    Radiology Reports:  MRI ABDOMEN W WO CONTRAST MRCP   Final Result   1. Suggestion of interstitial edematous pancreatitis in the pancreatic tail,   consistent with acute on chronic pancreatitis given the CT appearance. 2. Approximately 13.8 cm x 6.5 cm x 2.3 cm heterogeneous collection in the   left subphrenic space. Moderate fat necrosis could have this appearance, but   the affected area is outside the pancreas with no findings of necrotizing   pancreatitis. Additionally, the finding was absent less than 4 weeks prior. There is suspicion for continuity of the lesion with the main pancreatic duct   in the tail, although this is incompletely evaluated due to only incomplete   inclusion of the affected area on the 3D MRCP sequence. This suggest the   possibility of fat necrosis from leaked pancreatic fluid. Abscess is an   additional consideration. 3. Mild dilation of the upstream pancreatic duct with abrupt caliber change   in the pancreatic neck potentially due to a filling defect such as   calcification as seen on CT or a stricture. There are no findings suggestive   of an obstructing malignancy. 4. Mild intrahepatic and extrahepatic biliary dilation of indeterminate   etiology with no findings of choledocholithiasis. Consider ERCP if there are   clinical findings of cholestasis. 5. Left para-aortic lymphadenopathy is likely reactive. Recommend attention   on follow-up imaging. US THORACENTESIS Which side should the procedure be performed? Left   Final Result   Successful ultrasound guided left thoracentesis. CT ABDOMEN PELVIS W IV CONTRAST Additional Contrast? Oral and Rectal   Final Result   Increasing multiloculated rim enhancing fluid collections in the left upper   quadrant.   Slightly decreased size of the dominant pocket of fluid which now   contains a percutaneous drainage catheter, though there are innumerable new   smaller collections and other previously present smaller collections which   are increased in size. I suspect many of these do not communicate with the   pocket of fluid containing the drainage catheter. Excellent opacification of the colon with enteric contrast without definitive   enteric contrast in the left upper quadrant collection to confirm a colonic   source. Pancreatic source is a possible alternative consideration in the   appropriate clinical setting. Persistent colonic wall thickening in the region of the hepatic flexure on a   background of probable ileus. Increased urinary bladder wall thickening with slightly increased perinephric   stranding. Correlate with urinary labs for contributory urinary tract   infection. XR CHEST (SINGLE VIEW FRONTAL)   Final Result   Pulmonary congestion with bibasilar effusions and adjacent infiltrates. IR GUIDED THORACENTESIS PLEURAL   Final Result   Successful ultrasound guided thoracentesis. CT ABSCESS DRAINAGE   Final Result   Successful percutaneous ultrasound and CT guided placement of 8 [place] left   upper quadrant drainage catheter. New high attenuation material within the multi loculated left upper quadrant   fluid collection suggests leakage from the adjacent abnormal colonic segment   indicating micro perforation. The above findings were called by Dr. Jevon Lam MD to Dr. Temitope Hinkle On 7/28/2021 at 12:32. CT ABDOMEN PELVIS W IV CONTRAST Additional Contrast? Radiologist Recommendation   Final Result   1. Large complex fluid collection in the left upper quadrant, concerning for   abscess. This is likely related to the previously described colitis. 2. Consolidation and ground-glass opacity in both lower lobes with left   pleural effusion, concerning for multifocal pneumonia.    3. Diffuse urinary bladder wall thickening. This could be due to   underdistention, but correlation for any signs or symptoms of cystitis is   recommended. 4. Chronic pancreatitis. 5. Nonobstructing left nephrolithiasis. XR CHEST (SINGLE VIEW FRONTAL)   Final Result   Interval improvement in still mild interstitial edema with bilateral   asymmetric, left greater than right, pleural effusions         CT HEAD WO CONTRAST   Final Result   No acute intracranial abnormality. Suboccipital craniectomy. XR CHEST PORTABLE   Final Result   1. Increasing vascular congestion and left perihilar opacity. 2.  Pleural effusions and basilar opacities are otherwise without significant   change. XR ACUTE ABD SERIES CHEST 1 VW   Final Result   Bilateral airspace disease and pleural effusions. Findings in the upper   lobes appear increased compared to prior, left greater than right. Findings   may be related to asymmetric edema with superimposed pneumonia not excluded. Gas and stool are seen throughout nondilated bowel loops with few nonspecific   air-fluid levels in the right lower abdomen, likely in small bowel. Findings   may be physiologic or related to mild ileus. No evidence of obstruction or   free air. XR LUMBAR SPINE (2-3 VIEWS)   Final Result   Lumbar spine: Superior endplate fracture L5 which appears acute to subacute. No subluxation. Other vertebra well maintained. Spine significant compression deformity T6 with there are endplate loss in   height T8, T9 and T10. No subluxation. Mild levo scoliotic curvature. Osteopenia complicates assessment. Pedicles are intact. Low lung volumes   complicate assessment. There is suggestion of pleural effusions. XR THORACIC SPINE (3 VIEWS)   Final Result   Lumbar spine: Superior endplate fracture L5 which appears acute to subacute. No subluxation. Other vertebra well maintained.       Spine significant compression deformity T6 with there are endplate loss in   height T8, T9 and T10. No subluxation. Mild levo scoliotic curvature. Osteopenia complicates assessment. Pedicles are intact. Low lung volumes   complicate assessment. There is suggestion of pleural effusions. MRI THORACIC SPINE WO CONTRAST   Final Result   1. Limited examination. 2. Compression deformities involving T6, T8, T9 and T11. These are likely   chronic in nature. Diffusely decreased T1 and T2 marrow signal within the T6   vertebral body compatible with the sclerosis seen on the prior CT. 3. There is minimal bone marrow edema involving the left lateral elements of   T6 and T7. Unclear whether this is degenerative in nature or perhaps   sequelae of recent or remote trauma. 4. No significant spinal canal stenosis of the thoracic spine. 5. Moderate bilateral neural foraminal narrowing at T6-T7.   6. Bilateral pleural effusions, left greater than right. MRI LUMBAR SPINE WO CONTRAST   Final Result   1. Acute compression deformity of the superior endplate of L5 with   approximately 35% loss of height. No significant bulging of the posterior   cortex. 2. No significant spinal canal stenosis of the lumbar spine. 3. Neural foraminal narrowing at L3-L4 through L5-S1.   4. Minimal retrolisthesis at L5-S1. XR CHEST PORTABLE   Final Result   Interval development of bibasilar infiltrates and small pleural effusions   which could represent dependent edema, pneumonia or aspiration. XR ABDOMEN (KUB) (SINGLE AP VIEW)   Final Result   Mild dilation of the right hemicolon and patchy small bowel gas most likely   due to ileus. XR CHEST PORTABLE   Final Result   No radiologic evidence of acute cardiopulmonary disease. CT CHEST ABDOMEN PELVIS W CONTRAST   Final Result   Probable acute diverticulitis or colitis left colon. No evidence of   perforation or abscess at this time. Chronic pancreatitis. Multiple compression fractures some of which are new since the previous exam.      Lingular ground-glass infiltrate. Recommend follow-up to resolution. Intra-atrial lipoma right atrium. Cardiac echo may be helpful. CT LUMBAR SPINE TRAUMA RECONSTRUCTION   Final Result   CT thoracic spine:      1. Decrease in height in T6 compared to prior study compatible with   worsening compression with subacute etiology and mild protrusion of the   dorsal aspect of T6. Narrowed T6-T7 neural foramina. 2.  Chronic superior height T8, T9, and T11 without interval change from   prior study. 3.  Mild prevertebral soft tissue prominence T6 without evidence of abscess   formation. CT lumbar spine:      1. No traumatic malalignment. 2.  Compression fracture superior L5 with subacute appearance. 3.  Calcifications in the abdomen incompletely included appearance suggesting   pancreatic calcifications. RECOMMENDATIONS:   Please reference CT chest, abdomen and pelvis of same day. CT THORACIC SPINE TRAUMA RECONSTRUCTION   Final Result   CT thoracic spine:      1. Decrease in height in T6 compared to prior study compatible with   worsening compression with subacute etiology and mild protrusion of the   dorsal aspect of T6. Narrowed T6-T7 neural foramina. 2.  Chronic superior height T8, T9, and T11 without interval change from   prior study. 3.  Mild prevertebral soft tissue prominence T6 without evidence of abscess   formation. CT lumbar spine:      1. No traumatic malalignment. 2.  Compression fracture superior L5 with subacute appearance. 3.  Calcifications in the abdomen incompletely included appearance suggesting   pancreatic calcifications. RECOMMENDATIONS:   Please reference CT chest, abdomen and pelvis of same day.          XR THORACIC SPINE (2 VIEWS)    (Results Pending)   XR LUMBAR SPINE (2-3 VIEWS)    (Results Pending)   XR CHEST PORTABLE (Results Pending)        Echocardiogram:   Results for orders placed during the hospital encounter of 07/21/21    ECHO Complete 2D W Doppler W Color    Summary  Left ventricle is normal in size. Global left ventricular systolic function  is difficult to assess due to heart rate but appears normal. Calculated  ejection fraction 70% by Guadarrama's method. Visually estimated EF 60-65%. Left atrium is normal in size. Lipomatous atrial septum. No shunt seen by color Doppler. Normal right ventricular size and function. No significant valvular regurgitation or stenosis seen. ASSESSMENT AND PLAN     Assessment:    // Acute hypoxic respiratory failure IMPROVED   // Severe sepsis IMPROVED   // Acute metabolic encephalopathy, improved  // Intra-abdominal abscess, s/p IR guided drainage 7/28, suspected pancreatic origin  // Left pleural effusion/compressive atelectasis, s/p thoracentesis, pleural fluid exudative, with neutrophilic predominance and elevated amylase levels  // COPD, severity to be determined  // Compression fracture of thoracic vertebrae  // Seizure disorder  // Essential hypertension  // Hyperlipidemia  // Chronic pancreatitis  // Alcohol abuse  // Tobacco abuse    Plan:    Continue albuterol Spiriva and Symbicort  Home O2 eval prior to discharge  EUS, ERCP as per GI  Continue antibiotics    Jacinto Kay MD  Pulmonary and Critical Care Medicine           8/2/2021     Please note that this chart was generated using voice recognition Dragon dictation software. Although every effort was made to ensure the accuracy of this automated transcription, some errors in transcription may have occurred.

## 2021-08-02 NOTE — PROGRESS NOTES
[facility]   Re-consultNote    Meg Cancino is a 45 y.o. female patient. Hospitalization Day:12      Chief consult reason:    Increasing abdominal pain  ? Diverticulitis/colitis, leukocytosis   re-consulted for pancreatic fluid collection    Subjective:  Patient seen and examined. Patient states abdominal pain is under much better control today. She tolerated clear liquids yesterday. FRANSISCO drain with bloody sanguinous drainage. MRI/MRCP indicated interstitial edematous pancreatitis in the pancreatic tail consistent with acute on chronic pancreatitis  Additionally, 13.8 cm x 6.5 cm x 2.3 cm fluid collection in left subphrenic space with suspicion for continuity of the lesion with the main pancreatic duct in the tail, mild ductal dilatation with abrupt caliber changes in the pancreatic neck-see report for details the above findings are suspicious for a  Pancreatic duct stricture causing upstream dilation and leakage of pancreatic fluid. VITALS:  /89   Pulse 104   Temp 98.9 °F (37.2 °C) (Oral)   Resp (!) 31   Ht 5' 5\" (1.651 m)   Wt 142 lb 9.6 oz (64.7 kg)   SpO2 93%   BMI 23.73 kg/m²   TEMPERATURE:  Current - Temp: 98.9 °F (37.2 °C);  Max - Temp  Av.5 °F (36.9 °C)  Min: 98.1 °F (36.7 °C)  Max: 98.9 °F (37.2 °C)    Physical Assessment:  General appearance:  alert, cooperative and moderate pain distress  Mental Status:  oriented to person, place and time and normal affect  Lungs:  clear to auscultation bilaterally, normal effort  Heart:  regular rate and rhythm, no murmur  Abdomen:  soft, slightly tender, nondistended, normal bowel sounds, no masses, hepatomegaly, splenomegaly  Extremities:  no edema, redness, tenderness in the calves  Skin: Rash posterior back    Data Review:    Labs and Imaging:     CBC:  Recent Labs     21  0831 21  0638 21  0349   WBC 24.9* 18.1* 15.1*   HGB 9.8* 9.0* 9.1*   MCV 92.3 91.7 93.6   RDW 13.1 13.1 13.0   * 474* 505*       ANEMIA STUDIES:  No results for input(s): LABIRON, TIBC, FERRITIN, KVCOBVRV38, FOLATE, OCCULTBLD in the last 72 hours. BMP:  Recent Labs     07/31/21  0831 08/01/21  0638 08/02/21  0349    139 138   K 4.2 3.4* 3.7    100 102   CO2 24 24 24   BUN 5* 4* 3*   CREATININE 0.50 0.36* 0.38*   GLUCOSE 87 77 129*   CALCIUM 8.9 8.7 8.5*       LFTS:  Recent Labs     07/31/21  0831 08/01/21  0638   ALKPHOS 440* 358*   ALT 21 15   AST 33* 20   BILITOT 0.47 0.35   BILIDIR 0.31* 0.22   LABALBU 2.6* 2.3*       Amylase/Lipase and Ammonia:  Recent Labs     07/31/21  0831   AMYLASE 35   LIPASE 80*       Acute Hepatitis Panel:  Lab Results   Component Value Date    HEPBSAG NONREACTIVE 07/14/2019    HEPCAB NONREACTIVE 07/14/2019    HEPBIGM NONREACTIVE 07/14/2019    HEPAIGM NONREACTIVE 07/14/2019       HCV Genotype:  No results found for: HEPATITISCGENOTYPE    HCV Quantitative:  No results found for: HCVQNT    LIVER WORK UP:    AFP  Lab Results   Component Value Date    AFP 7.3 07/21/2021       Alpha 1 antitrypsin   No results found for: A1A    BRADLEY  Lab Results   Component Value Date    BRADLEY NEGATIVE 04/13/2021       AMA  No results found for: MITOAB    ASMA  No results found for: SMOOTHMUSCAB    PT/INR  No results for input(s): PROTIME, INR in the last 72 hours. Cancer Markers:  CEA:  No results for input(s): CEA in the last 72 hours. Ca 125:  No results for input(s):  in the last 72 hours. Ca 19-9:   Invalid input(s):   AFP: No results for input(s): AFP in the last 72 hours. Lactic acid:Invalid input(s): LACTIC ACID    Radiology Review:      7/31/2021 MRI MRCP abdomen and pelvis  FINDINGS:   LOWER CHEST:  Trace to small right and small left pleural effusions with   associated partial to complete collapse of the bilateral lower lobes.    Suspected air bronchograms in the left lower lobe.  Mild cardiomegaly.  No   pericardial effusion.       LIVER:  Slightly increased signal on opposed-phase images and decreased   signal on T2-weighted images, suggesting iron deposition.  No findings of   cirrhosis.       GALLBLADDER/BILE DUCTS:  Normal gallbladder.  Mild intrahepatic and   extrahepatic biliary dilation with normal tapering of distal common bile   duct.  No obvious ductal filling defects nor strictures.       PANCREAS:  Typical duct configuration.  Mild dilation of the upstream   pancreatic duct to the level of the neck with an apparent filling defect or   stricture at the area of caliber change.  Potential leak of fluid from the   upstream main duct in the tail.  Mild to moderate parenchymal atrophy. Suggestion of parenchymal edema in the tail.  No abnormal enhancement       SPLEEN:  Mildly atrophic.  Increased signal on opposed-phase images and   decreased signal on T2-weighted images, suggesting iron deposition.       ADRENAL GLANDS:  Normal.       KIDNEYS:  Normal.       GI/BOWEL:  Normal course and caliber of the stomach, small bowel, colon, and   rectum without obstruction.       PELVIS:  Surgical absence of the urinary bladder and likely of the ovaries   better assessed on CT.  Normal urinary bladder.       PERITONEUM/RETROPERITONEUM:  Unchanged nonenlarged to mildly enlarged left   para-aortic lymph nodes.  Trace simple free intraperitoneal fluid. Approximately 13.8 cm x 6.5 cm x 10.3 cm heterogeneous though predominantly   T1 hyperintense and T2 hypointense lesion in the left subphrenic space near   the stomach, spleen, and pancreatic tail with possible diffusion restriction   and peripheral enhancement.       VESSELS:  Typical arterial anatomy.  Mild to moderate systemic   atherosclerosis.  Normal variant circumaortic left renal vein.  Patent veins.       SOFT TISSUES/BONES:  Tiny fat containing umbilical hernia.  Signal   abnormalities related to suspected subacute compression deformities in the   thoracic and lumbar spine.  Multilevel degenerative disc disease.           Impression   1. Suggestion of interstitial edematous pancreatitis in the pancreatic tail,   consistent with acute on chronic pancreatitis given the CT appearance. 2. Approximately 13.8 cm x 6.5 cm x 2.3 cm heterogeneous collection in the   left subphrenic space.  Moderate fat necrosis could have this appearance, but   the affected area is outside the pancreas with no findings of necrotizing   pancreatitis.  Additionally, the finding was absent less than 4 weeks prior. There is suspicion for continuity of the lesion with the main pancreatic duct   in the tail, although this is incompletely evaluated due to only incomplete   inclusion of the affected area on the 3D MRCP sequence.  This suggest the   possibility of fat necrosis from leaked pancreatic fluid.  Abscess is an   additional consideration. 3. Mild dilation of the upstream pancreatic duct with abrupt caliber change   in the pancreatic neck potentially due to a filling defect such as   calcification as seen on CT or a stricture.  There are no findings suggestive   of an obstructing malignancy. 4. Mild intrahepatic and extrahepatic biliary dilation of indeterminate   etiology with no findings of choledocholithiasis.  Consider ERCP if there are   clinical findings of cholestasis. 5. Left para-aortic lymphadenopathy is likely reactive.  Recommend attention   on follow-up imaging.        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24, the patient underwent 2 surgical resections without known recurrence    Metabolic encephalopathy    AMAN (generalized anxiety disorder)    Major depressive disorder, recurrent severe without psychotic features ([address]    Chronic peripheral neuropathic pain    History of alcohol abuse    Personal history of astrocytoma    Marijuana abuse    Closed fracture of fifth thoracic vertebra (HCC)    Diverticulitis of large intestine with abscess without bleeding    Elevated brain natriuretic peptide (BNP) level    Elevated alkaline phosphatase level    Chronic pancreatitis (HCC)    Erythema ab igne    Compression fracture of thoracic vertebra (HCC)    Compression of lumbar vertebra (HCC)    Metabolic acidosis with increased anion gap and accumulation of organic acids    Community acquired pneumonia of left lower lobe of lung    Septicemia ([address]    Alcohol-induced acute pancreatitis    Fluid collection of pancreas    Diverticulitis of large intestine without perforation or abscess with bleeding    Pseudocyst of pancreas    Bandemia  Resolved Problems:    * No resolved hospital problems. *       GI Impression:    Acute alcoholic pancreatitis   Chronic pancreatitis with PD stricture and PD fluid leak  Pancreatic fluid collection suspect due to leaked PD duct leak s/p IR drain placement   Acute on chronic abdominal pain secondary to #1  Leukocytosis improving most likely due to #1  Repeated pleural effusions-S/P thoracentesis x2  Alcohol misuse disorder-last drink 12/2021  Multiple lumbar compression fractures      Plan and Recommendations:    Results of MRI were reviewed and discussed in detail with advanced endoscopist.  Patient will require an EUS to evaluate for any pancreatic stricture and rule out mass and with possible FNA. Simultaneously we will also perform an ERCP with pancreatic duct stent placement to channel PD flow into the small bowel in order to help heal the PD fluid leak. Timing will be determined on availability in OR schedule.   Continue antibiotic management per ID  Continue supportive care with IV fluids, antiemetics, pain medication  Continue liquid diet and please supplement with high-protein clear Ensures  Patient will need a calorie count to see if she can meet two thirds of her needs with clear liquid diet and supplements  Recommend dietitian to follow for calorie count and recommendations  Will follow      Thank you for allowing me to participate in the care of your patient. Please feel free to contact me with any questions or concerns. Marimar Winter, [address] [facility] the [facility]    This note was created with the assistance of a speech-recognition program.  Although the intention is to generate a document that actually reflects the content of the visit, no guarantees can be provided that every mistake has been identified and corrected by editing.

## 2021-08-02 NOTE — PROGRESS NOTES
Comprehensive Nutrition Assessment    Type and Reason for Visit:  Reassess, Consult (Calorie count-patient has pancreatitis and clear liquid diet with supplements were started)    Nutrition Recommendations/Plan:   -Continue regular diet   -Suggest modifying ensure clear supplements to ensure enlive supplements TID   -Will monitor po intake and weights     Nutrition Assessment:  Pt has now been transitioned to a regular diet. RD consulted to start calorie count today. Will modify ONS to a higher kcal/protein supplement. Will follow-up w/ calorie count results in 3 days (Thursday) to further assess pt's nutritional plan. Malnutrition Assessment:  Malnutrition Status:  Insufficient data    Context:  Acute Illness     Findings of the 6 clinical characteristics of malnutrition:  Energy Intake:  1 - 75% or less of estimated energy requirements for 7 or more days  Weight Loss:  No significant weight loss     Body Fat Loss:  Unable to assess     Muscle Mass Loss:  Unable to assess    Fluid Accumulation:  1 - Mild Extremities   Strength:  Not Performed    Estimated Daily Nutrient Needs:  Energy (kcal):  1750-950 kcal/d (27-30 kcal/kg); Weight Used for Energy Requirements:  Current     Protein (g):  80-90 g protein/d (1.2-1.4 g/kg); Weight Used for Protein Requirements:  Current        Fluid (ml/day):  2713-3208 mL fluid/d or per MD; Method Used for Fluid Requirements:  ml/Kg (25-30 mL)      Nutrition Related Findings:  BM 8/1; labs/meds reviewed      Wounds:  None       Current Nutrition Therapies:    ADULT DIET;  Regular  Adult Oral Nutrition Supplement; Standard High Calorie/High Protein Oral Supplement    Anthropometric Measures:  · Height: 5' 5\" (165.1 cm)  · Current Body Weight: 142 lb (64.4 kg)   · Admission Body Weight: 140 lb (63.5 kg)    · Usual Body Weight: 133 lb 9.6 oz (60.6 kg) (3/31/2021)     · Ideal Body Weight: 125 lbs; % Ideal Body Weight 113.6 %   · BMI: 23.6  · BMI Categories: Normal Weight (BMI 18.5-24. 9)       Nutrition Diagnosis:   · Inadequate oral intake related to altered GI function as evidenced by NPO or clear liquid status due to medical condition (x 3 days)      Nutrition Interventions:   Food and/or Nutrient Delivery:  Continue Current Diet, Modify Oral Nutrition Supplement, Start Calorie Count (advance diet as able)  Nutrition Education/Counseling:  Education not indicated   Coordination of Nutrition Care:  Continue to monitor while inpatient    Goals: Progressing   Intake able to meet >75% of estimated nutrition needs       Nutrition Monitoring and Evaluation:   Food/Nutrient Intake Outcomes:  Diet Advancement/Tolerance, Food and Nutrient Intake, Supplement Intake  Physical Signs/Symptoms Outcomes:  Biochemical Data, Nutrition Focused Physical Findings, Skin, Weight, GI Status, Fluid Status or Edema     Discharge Planning:     Too soon to determine     Electronically signed by Cailin Monge RD, LD on 8/2/21 at 12:54 PM EDT    Contact: [phone]

## 2021-08-02 NOTE — PROGRESS NOTES
Occupational Therapy  Daily Treatment Note  Date: 2021  Patient Name: Shabnam Bryant  :  1970  MRN: [id number]         Chief Complaint   Patient presents with    Back Pain     states chronic back pain becoming worse.  has recently started physical therapy     Discharge Recommendation  Further therapy recommended at discharge. Assessment   Performance deficits / Impairments: Decreased functional mobility ; Decreased ADL status; Decreased strength;Decreased safe awareness;Decreased cognition;Decreased endurance;Decreased balance;Decreased high-level IADLs;Decreased coordination  Prognosis: Good  Decision Making: High Complexity  PT Education  PT Education: Plan of Care;PT Role;General Safety;Transfer Training;Gait Training  Patient Education: Pt educated on purpose of OT, POC, benefits of particpating in therapy, benefits of out of bed activity, good return  REQUIRES OT FOLLOW UP: Yes  Timed Code Treatment Minutes: 25 Minutes  Activity Tolerance  Activity Tolerance: Patient Tolerated treatment well;Patient limited by fatigue  Safety Devices  Safety Devices in place: Yes  Type of devices: Call light within reach;Gait belt;Patient at risk for falls;Nurse notified; Left in bed (nurse present at end of session)  Restraints  Initially in place: No      Subjective   General  Chart Reviewed: Yes  Patient assessed for rehabilitation services?: Yes  Family / Caregiver Present: No  Diagnosis: Sepsis  General Comment  Comments: RN ok'd for therapy this afternoon. Pt agreeable to session and pleasant/cooperative throughout  Pain Assessment  Pain Assessment: 0-10  Pain Level: 5  Pain Type: Acute pain  Pain Location: Abdomen (side of abdomen)  Pain Orientation: Left  Non-Pharmaceutical Pain Intervention(s): Ambulation/Increased Activity; Distraction; Therapeutic presence  Response to Pain Intervention: Patient Satisfied  Vital Signs  Patient Currently in Pain: Yes   Treatment Activities:    ADL  Grooming: Increased time to complete;Setup;Supervision (oral care and face washing)  Toileting: Setup; Increased time to complete;Stand by assistance (brief change and bella care)  Additional Comments: OT facilitated pt in completing brief change and bella care w/ SBA and set up near chair, pt required increased time to complete. Additionally, pt completed oral care w/ dentures and face washing at sink to w/ supervision and set up, required increased time to complete. Upon completion SPO2 at 99%. Standing Balance: Stand by assistance  Standing Balance  Time: ~15 minutes  Activity: dynamic standing at sink w/ supervision to complete grooming and oral care and dynamic standing w/ SBA near chair to complete bella care and brief change  Sitting Balance: Modified independent  (~15 minutes in chair)  Balance  Sitting Balance: Modified independent  (~15 minutes in chair)  Standing Balance: Stand by assistance  Functional Mobility  Functional - Mobility Device: No device  Activity: To/from bathroom  Assist Level: Stand by assistance  Functional Mobility Comments: Pt completed functional mobility to/from bathroom w/ SBA to complete oral care and grooming at sink.  No LOB however some unsteadiness noted            Bed mobility  Supine to Sit: Unable to assess (pt in chair upon entry)  Sit to Supine: Stand by assistance  Scooting: Stand by assistance  Transfers  Sit to stand: Stand by assistance  Stand to sit: Stand by assistance        Cognition  Overall Cognitive Status: [facility]  Cognition Comment: Pt demo improved cognition on 8/2          Plan   Plan  Times per week: 3-5 x/wk  Current Treatment Recommendations: Strengthening, Balance Training, Functional Mobility Training, Endurance Training, Cognitive Reorientation, Pain Management, Safety Education & Training, Patient/Caregiver Education & Training, Equipment Evaluation, Education, & procurement, Self-Care / ADL         Goals  Short term goals  Time Frame for Short term goals: By discharge, pt will:  Short term goal 1: Demo Mod I for bed mobility to increase independence with ADLs and decrease risk for pressure injury  Short term goal 2: Demo Mod I with setup and increased time PRN for UB ADLs and grooming tasks  Short term goal 3: Demo mod I with setup and AE PRN for LB ADLs and toileting tasks (Goal modified by Tk Grijalva 8/2)  Short term goal 4: Demo 15+ minutes dynamic standing and reaching task to increase balance for safety and independence with ADLs/IADLs (Goal modified by Tk Grijalva 8/2)  Short term goal 5: Demo mod I for functional transfers and functional mobility with use of LRD PRN (Goal modified by Tk Grijalva 8/2)  Short term goal 6: demo 2 energy conservation techniques to utilize when completing ADLs (Goal added by Tk Grijalva 8/2)    Therapy Time   Individual Concurrent Group Co-treatment   Time In 1446         Time Out 1523         Minutes 37         Timed Code Treatment Minutes: [place], OT/S

## 2021-08-02 NOTE — PROGRESS NOTES
PATIENT REFUSES TO WEAR BIPAP     [x] Risks and benefits explained to patient   [x] Patient refuses to wear Bipap stating No thank you  [x] Patient verbalizes understanding of information presented.

## 2021-08-02 NOTE — PROGRESS NOTES
[facility]  Office: [address], DO, Rahatneisha Bender, DO, Kennedyana Moore, DO, Vicky Huang, DO, Rebkeah Cross MD, Delores Yoder MD, Chirag Vidal MD, Dustin Sampson MD, Lisa Campa MD, Manolo Blevins MD, Manan Maya MD, Leigh Figueroa, DO, Alvino Mcdaniel MD, River Castillo DO, Chesley Saint, MD,  Glynda Fothergill, DO, Blu Ramos MD, Jose Ramon Loera MD, Gerard Rivera MD, Antelmo Banuelos MD, Huey Hudson MD, Luis Herzog MD, Lazaro Whitney, CNP, Dennie Hageman, CNP, Julia Mccrary, CNS, Jackie Obrien, CNP, Freddy Kohler, CNP, Tana Meneses, CNP, Jorge Campoverde, CNP, Myra Mancia, CNP, Emir Coe, PA-C, Rm Duran, [facility], Kareem Lr, CNP, Maria Antonia Quiroz, CNP, Ashele Roberto, CNP, Finesse Fernandez, CNP, Matthew Michel, CNP, Taylor Thomason, CNP, Aliya Barboza, CNP, Leslie Gear, [address] [place] [address]    Progress Note    8/2/2021    8:04 AM    Name:   Junior Sharp  MRN:     [id number]     Acct:      [de-identified]   Room:   2022/2022-01  IP Day:  12  Admit Date:  7/21/2021  3:06 PM    PCP:   Santa Lewis MD  Code Status:  Full Code    Subjective:     C/C:   Chief Complaint   Patient presents with    Back Pain     states chronic back pain becoming worse.  has recently started physical therapy     Interval History Status: worsened. Seen and evaluated in room  Complaining of anxiety and nicotine withdrawal  Was wearing 4 L low flow nasal cannula at time of assessment  Labs, vitals, notes reviewed  White count downtrending    Brief History:      This is a 78-year-old female with underlying history of hypertension, COPD, anxiety/depression, UTI, thoracic compression fractures, peripheral neuropathy, migraine headaches, seizure disorder, and alcoholism (last drink 6 months ago) who presents the emergency department with generalized malaise, headache and abdominal/back pain.    Initial blood work revealed leukocytosis, elevated alkaline phosphatase, she was tachycardic and hypoxic as well, imaging revealed picture of acute diverticulitis/colitis of left colon without peripheral abscess along with chronic pancreatitis, CT spine revealed stable T7-T9 and T11 compression fracture, worsening of compression fracture of T6 and new T5 and superior L5 subacute fracture. Admitted to the hospital for sepsis related to diverticulitis. Started on zosyn. Neurosurgery consulted regarding compression fractures, no surgical intervention inpatient. Pt condition continued to worsen. Chest x-ray suggestive of pneumonia. Pulmonology consulted. Hi flow nasal cannula initiated in conjunction with steroids. ID consulted for abx recommendations.      7/27: pt continued to deteriorate clinically. Repeat CT of the abdomen revealed LUQ abscess, left pleural effusion. IR consulted for thoracentesis and intraabdominal abscess drainage. General surgery consulted for intraabdominal abscess.      7/28: IR guided drain placed for intraabdominal abscess. Thoracentesis completed, 400 mL fluid removed. Exudative based on Light's criteria. Review of Systems:     Constitutional:  negative for chills, fevers, sweats  Respiratory:  negative for cough, dyspnea on exertion, shortness of breath, wheezing  Cardiovascular:  negative for chest pain, chest pressure/discomfort, lower extremity edema, palpitations  Gastrointestinal:  + abdominal pain, constipation, diarrhea, nausea, vomiting  Neurological:  negative for dizziness, headache    Medications:      Allergies:  No Known Allergies    Current Meds:   Scheduled Meds:    tiotropium  2 puff Inhalation Daily    sodium chloride flush  5-40 mL Intravenous BID    pantoprazole  40 mg Oral QAM AC    guaiFENesin  600 mg Oral BID    nicotine  1 patch Transdermal Daily    lidocaine  1 patch Transdermal Daily    spironolactone  50 mg Oral Daily    polyethylene glycol  17 g Oral BID    carBAMazepine  200 mg Oral TID    topiramate  50 mg Oral BID    amLODIPine  5 mg Oral Daily    baclofen  10 mg Oral TID    budesonide-formoterol  2 puff Inhalation BID    busPIRone  15 mg Oral TID    ferrous sulfate  325 mg Oral BID    folic acid  1 mg Oral Daily    potassium chloride  20 mEq Oral Daily with breakfast    pregabalin  200 mg Oral TID    rOPINIRole  1 mg Oral Nightly    sodium chloride flush  5-40 mL Intravenous 2 times per day    enoxaparin  40 mg Subcutaneous Daily     Continuous Infusions:    lactated ringers 100 mL/hr at 08/01/21 2249    sodium chloride 25 mL (07/24/21 1803)     PRN Meds: magnesium sulfate, fentanNYL **OR** fentanNYL, potassium chloride **OR** potassium alternative oral replacement **OR** potassium chloride, LORazepam, hyoscyamine, hydrOXYzine, bisacodyl, albuterol, traZODone, sodium chloride flush, sodium chloride, acetaminophen **OR** acetaminophen, [Held by provider] oxyCODONE-acetaminophen **OR** [DISCONTINUED] oxyCODONE-acetaminophen, melatonin    Data:     Past Medical History:   has a past medical history of Acute respiratory failure with hypoxia ([address] Alcohol withdrawal syndrome, with delirium ([address] Alcoholism ([address] Anemia, 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 Social History:   reports that she has been smoking cigarettes. She has a 15.00 pack-year smoking history. She has never used smokeless tobacco. She reports previous alcohol use. She reports current drug use. Frequency: 2.00 times per week. Drug: Marijuana. Family History:   Family History   Problem Relation Age of Onset    Other Father     Cancer Maternal Grandmother     Heart Disease Paternal Grandmother     Esophageal Cancer Maternal Aunt        Vitals:  BP (!) 154/101   Pulse 85   Temp 98.4 °F (36.9 °C) (Oral)   Resp 22   Ht 5' 5\" (1.651 m)   Wt 142 lb 9.6 oz (64.7 kg)   SpO2 93%   BMI 23.73 kg/m²   Temp (24hrs), Av.5 °F (36.9 °C), Min:98.4 °F (36.9 °C), Max:98.6 °F (37 °C)    No results for input(s): POCGLU in the last 72 hours. I/O (24Hr): Intake/Output Summary (Last 24 hours) at 2021 0804  Last data filed at 2021 0645  Gross per 24 hour   Intake 2452 ml   Output 2360 ml   Net 92 ml       Labs:  Hematology:  Recent Labs     21  0349   WBC 24.9* 18.1* 15.1*   RBC 3.36* 3.03* 3.12*   HGB 9.8* 9.0* 9.1*   HCT 31.0* 27.8* 29.2*   MCV 92.3 91.7 93.6   MCH 29.2 29.7 29.2   MCHC 31.6 32.4 31.2   RDW 13.1 13.1 13.0   * 474* 505*   MPV 9.9 9.9 9.8     Chemistry:  Recent Labs     21  1004 21  1850 21  0831 21  0638 21  0349   NA  --   --   --   --  139 139 138   K  --   --  3.9   < > 4.2 3.4* 3.7   CL  --   --   --   --  100 100 102   CO2  --   --   --   --  24 24 24   GLUCOSE  --   --   --   --  87 77 129*   BUN  --   --   --   --  5* 4* 3*   CREATININE  --   --   --   --  0.50 0.36* 0.38*   MG  --  1.9 1.9  --   --   --   --    ANIONGAP  --   --   --   --  15 15 12   LABGLOM  --   --   --   --  >60 >60 >60   GFRAA  --   --   --   --  >60 >60 >60   CALCIUM  --   --   --   --  8.9 8.7 8.5*   LACTACIDWB 0.6*  --   --   --   --   --   --     < > = values in this interval not displayed.      Recent Labs     21  0831 21  0638   PROT 6.1* 5.5*   LABALBU 2.6* 2.3*   AST 33* 20   ALT 21 15   ALKPHOS 440* 358*   BILITOT 0.47 0.35   BILIDIR 0.31* 0.22   AMYLASE 35  --    LIPASE 80*  -- ABG:  Lab Results   Component Value Date    POCPH 7.467 07/26/2021    POCPCO2 44.7 07/26/2021    POCPO2 71.9 07/26/2021    POCHCO3 32.3 07/26/2021    NBEA NOT REPORTED 07/26/2021    PBEA 8 07/26/2021    MFP3ARO NOT REPORTED 07/26/2021    RZEI2FTI 95 07/26/2021    FIO2 INFORMATION NOT PROVIDED 07/28/2021     Lab Results   Component Value Date/Time    SPECIAL NOT REPORTED 07/30/2021 05:20 PM     Lab Results   Component Value Date/Time    CULTURE NO GROWTH 3 DAYS 07/30/2021 05:20 PM       Radiology:  XR CHEST (SINGLE VIEW FRONTAL)    Result Date: 7/29/2021  Pulmonary congestion with bibasilar effusions and adjacent infiltrates. XR CHEST (SINGLE VIEW FRONTAL)    Result Date: 7/27/2021  Interval improvement in still mild interstitial edema with bilateral asymmetric, left greater than right, pleural effusions     XR THORACIC SPINE (3 VIEWS)    Result Date: 7/23/2021  Lumbar spine: Superior endplate fracture L5 which appears acute to subacute. No subluxation. Other vertebra well maintained. Spine significant compression deformity T6 with there are endplate loss in height T8, T9 and T10. No subluxation. Mild levo scoliotic curvature. Osteopenia complicates assessment. Pedicles are intact. Low lung volumes complicate assessment. There is suggestion of pleural effusions. XR LUMBAR SPINE (2-3 VIEWS)    Result Date: 7/23/2021  Lumbar spine: Superior endplate fracture L5 which appears acute to subacute. No subluxation. Other vertebra well maintained. Spine significant compression deformity T6 with there are endplate loss in height T8, T9 and T10. No subluxation. Mild levo scoliotic curvature. Osteopenia complicates assessment. Pedicles are intact. Low lung volumes complicate assessment. There is suggestion of pleural effusions. XR ACUTE ABD SERIES CHEST 1 VW    Result Date: 7/24/2021  Bilateral airspace disease and pleural effusions.   Findings in the upper lobes appear increased compared to prior, left greater than right. Findings may be related to asymmetric edema with superimposed pneumonia not excluded. Gas and stool are seen throughout nondilated bowel loops with few nonspecific air-fluid levels in the right lower abdomen, likely in small bowel. Findings may be physiologic or related to mild ileus. No evidence of obstruction or free air. CT HEAD WO CONTRAST    Result Date: 7/25/2021  No acute intracranial abnormality. Suboccipital craniectomy. MRI THORACIC SPINE WO CONTRAST    Result Date: 7/23/2021  1. Limited examination. 2. Compression deformities involving T6, T8, T9 and T11. These are likely chronic in nature. Diffusely decreased T1 and T2 marrow signal within the T6 vertebral body compatible with the sclerosis seen on the prior CT. 3. There is minimal bone marrow edema involving the left lateral elements of T6 and T7. Unclear whether this is degenerative in nature or perhaps sequelae of recent or remote trauma. 4. No significant spinal canal stenosis of the thoracic spine. 5. Moderate bilateral neural foraminal narrowing at T6-T7. 6. Bilateral pleural effusions, left greater than right. MRI LUMBAR SPINE WO CONTRAST    Result Date: 7/23/2021  1. Acute compression deformity of the superior endplate of L5 with approximately 35% loss of height. No significant bulging of the posterior cortex. 2. No significant spinal canal stenosis of the lumbar spine. 3. Neural foraminal narrowing at L3-L4 through L5-S1. 4. Minimal retrolisthesis at L5-S1. CT ABDOMEN PELVIS W IV CONTRAST Additional Contrast? Radiologist Recommendation    Result Date: 7/27/2021  1. Large complex fluid collection in the left upper quadrant, concerning for abscess. This is likely related to the previously described colitis. 2. Consolidation and ground-glass opacity in both lower lobes with left pleural effusion, concerning for multifocal pneumonia. 3. Diffuse urinary bladder wall thickening.   This could be due to underdistention, but correlation for any signs or symptoms of cystitis is recommended. 4. Chronic pancreatitis. 5. Nonobstructing left nephrolithiasis. XR CHEST PORTABLE    Result Date: 7/25/2021  1. Increasing vascular congestion and left perihilar opacity. 2.  Pleural effusions and basilar opacities are otherwise without significant change. CT ABSCESS DRAINAGE    Result Date: 7/28/2021  Successful percutaneous ultrasound and CT guided placement of 8 [place] left upper quadrant drainage catheter. New high attenuation material within the multi loculated left upper quadrant fluid collection suggests leakage from the adjacent abnormal colonic segment indicating micro perforation. The above findings were called by Dr. Chuck Locke MD to Dr. Augie Hinkle On 7/28/2021 at 12:32. IR GUIDED THORACENTESIS PLEURAL    Result Date: 7/28/2021  Successful ultrasound guided thoracentesis. Physical Examination:        General appearance: Anxious and admittedly cooperative, anxious.   mental Status:  oriented to person, place and time   Lungs:  clear to auscultation bilaterally, normal effort, 4 L low flow nasal cannula, posterior crackles on the right  Heart:  regular rate and rhythm, no murmur  Abdomen:  soft, tender, nondistended, normal bowel sounds, no masses, hepatomegaly, splenomegaly, FRANSISCO drain with bloody gelatinous contents  Extremities:  no edema, redness, tenderness in the calves  Skin:  no gross lesions, rashes, induration    Assessment:        Hospital Problems         Last Modified POA    * (Principal) Severe sepsis ([address] 7/28/2021 Yes    Essential hypertension 7/21/2021 Yes    Tobacco use 7/21/2021 Yes    Seizure disorder ([address] 7/25/2021 Yes    COPD with acute exacerbation ([address] 7/22/2021 Yes    Acute respiratory failure with hypoxia ([address] 7/22/2021 Yes    Recurrent major depressive disorder ([address] 7/21/2021 Yes    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24, the patient underwent 2 surgical resections without known recurrence 8/92/6857 Yes    Metabolic encephalopathy 2/02/0201 Yes    AMAN (generalized anxiety disorder) 7/21/2021 Yes    Major depressive disorder, recurrent severe without psychotic features ([address] 7/31/2021 Yes    Chronic peripheral neuropathic pain 7/21/2021 Yes    History of alcohol abuse 7/21/2021 Yes    Personal history of astrocytoma 7/21/2021 Yes    Marijuana abuse 7/21/2021 Yes    Closed fracture of fifth thoracic vertebra ([address] 7/22/2021 Yes    Diverticulitis of large intestine with abscess without bleeding 7/28/2021 Yes    Elevated brain natriuretic peptide (BNP) level 7/22/2021 Yes    Elevated alkaline phosphatase level 7/22/2021 Yes    Chronic pancreatitis ([address] 7/22/2021 Yes    Erythema ab igne 7/22/2021 Yes    Compression fracture of thoracic vertebra ([address] 7/23/2021 Yes    Compression of lumbar vertebra ([address] 3/98/3724 Yes    Metabolic acidosis with increased anion gap and accumulation of organic acids 7/28/2021 Yes    Community acquired pneumonia of left lower lobe of lung 8/1/2021 Yes    Septicemia ([address] 7/29/2021 Yes    Alcohol-induced acute pancreatitis 7/31/2021 Yes    Fluid collection of pancreas 8/1/2021 Yes    Diverticulitis of large intestine without perforation or abscess with bleeding 8/1/2021 Yes    Pseudocyst of pancreas 8/1/2021 Yes    Bandemia 8/1/2021 Yes          Plan:        Acute on chronic pancreatitis:   -GI following. -MRCP for diverting stent placement as outpatient. Can follow-up in GI clinic as outpatient if she can meet two thirds of her caloric intake  -With associated fluid collection in the left upper quadrant  - pancreatic fluid on thoracentesis  -Continue CLD, advance as tolerated    TME:   - With lactic acidosis initially on presentation.    - Complicated by abdominal abscess and history of EtOH abuse. - resolved    Diverticulitis with abscess:   - With likely small microperforation.    - Continue Zosyn.     - General surgery/infectious disease following.    - Abscess drained with IR and PERC drain placed. - Continues with bloody gelatinous drainage. Sepsis:   -Improving  -Responding well to antibiotic therapy  - Continue LR. Essential hypertension:   -Elevated Norvasc, restart spironolactone    Exudative pleural effusion:   - Pulmonary following  -Continue to monitor for recurrance  -S/P thoracentesis x2   -Chest x-ray today    Seizure disorder:   - Without breakthrough seizure. - EEG completed showing no abnormal activity.    - On Tegretol, Ativan for breakthrough seizures. - Continue seizure precautions.    - Neurology initially following, signed off.    - Follow-up with Dr. Ramirez Ayala in 4 to 6 weeks    Back pain with peripheral neuropathy:   - With history of chronic L4-5 radiculopathy and osteoporosis. - No fracture at T5 and L5 with chronic compression fracture with worsening at T6.    - Multimodal pain management including Lyrica, baclofen, lidocaine.   -  No neurosurgical intervention at this time    Migraines: Continue Topamax    Depression with anxiety:   - Guarded, patient continues on BuSpar, Atarax  - BuSpar increased today    EtOH abuse with chronic pancreatitis with hypomagnesemia:    - Continue supplementation  - Monitor for s/s of withdrawal    Livedo reticularis    Current everyday smoker: Cessation encouraged, nicotine patch as needed. Nicorette gum    GI/DVT prophylaxis: Protonix, Lovenox    Dispo: Home once full work-up is complete. Patient needs outpatient DEXA scan and follow-up outpatient for consideration of kyphoplasty with neurosurgery.  Follow-up with Dr. Ramirez Ayala in 4 to 6 weeks    LOI Cameron NP  8/2/2021  8:04 AM

## 2021-08-02 NOTE — PROGRESS NOTES
Pt was asking to go outside and smoke; writer discussed risks and smoking cessation with pt. Pt still wanted to go out. Writer notified on call NP and updated pt condition. NP clarified that patient had an updated form on leaving the unit against medical advice. Pt is going out with a [facility]. Will continue to monitor.

## 2021-08-02 NOTE — PLAN OF CARE
Problem: Pain:  Goal: Pain level will decrease  Description: Pain level will decrease  8/1/2021 2209 by Leigh Barnes RN  Outcome: Ongoing  8/1/2021 1804 by Gricelda Sanchez RN  Outcome: Ongoing  Goal: Control of acute pain  Description: Control of acute pain  8/1/2021 2209 by Leigh Barnes RN  Outcome: Ongoing  8/1/2021 1804 by Gricelda Sanchez RN  Outcome: Ongoing  Goal: Control of chronic pain  Description: Control of chronic pain  8/1/2021 2209 by Leigh Barnes RN  Outcome: Ongoing  8/1/2021 1804 by Gricelda Sanchez RN  Outcome: Ongoing     Problem: Skin Integrity:  Goal: Will show no infection signs and symptoms  Description: Will show no infection signs and symptoms  8/1/2021 2209 by Leigh Barnes RN  Outcome: Ongoing  8/1/2021 1804 by Gricelda Sanchez RN  Outcome: Ongoing  Goal: Absence of new skin breakdown  Description: Absence of new skin breakdown  8/1/2021 1804 by Gricelda Sanchez RN  Outcome: Ongoing     Problem: Falls - Risk of:  Goal: Will remain free from falls  Description: Will remain free from falls  8/1/2021 2209 by Leigh Barnes RN  Outcome: Ongoing  8/1/2021 1804 by Gricelda Sanchez RN  Outcome: Ongoing  Goal: Absence of physical injury  Description: Absence of physical injury  8/1/2021 2209 by Leigh Barnes RN  Outcome: Ongoing  8/1/2021 1804 by Gricelda Sanchez RN  Outcome: Ongoing     Problem: Nutrition  Goal: Optimal nutrition therapy  8/1/2021 2209 by Leigh Barnes RN  Outcome: Ongoing  8/1/2021 1804 by Gricelda Sanchez RN  Outcome: Ongoing

## 2021-08-02 NOTE — PROGRESS NOTES
of left lower lobe of lung    Septicemia ([address]    Alcohol-induced acute pancreatitis    Fluid collection of pancreas    Diverticulitis of large intestine without perforation or abscess with bleeding    Pseudocyst of pancreas    Bandemia       MEDICAL DECISION MAKING AND PLAN       Acute diverticulitis with abscess formation, IR drain placed with serosanguinous output, decreasing in volume.  Abdominal exam much improved   Leukocytosis: Improved today from 15.1 from 18.1    Diet:  CLD, advance as tolerated   DVT ppx: Lovenox   Urine output: 1.5 mL/kg/hr   GI Recs: if tolerating CLD, repeat imaging in 4 weeks, followup outpatient to assess need for endoscopic drainage of pancreatic psuedocyst   ID recs: per note discussed with GI, and still considering ERCP for panc stent placement to divert drainage   Left pleural effusion/ s/p thoraentesis x2, 400ml out, then 450ml out.  Disposition: Clinically improving, continue to monitor WBC        SUBJECTIVE    Sydell Rebeccaous was seen and examined at bedside this morning. No acute events overnight. Pt. denies CP, S OB, N/B, C/F. Abdominal pain much improved. Pt is having normal bowel movements and reports an appetite. VSS, afebrile, T-max 36.7      OBJECTIVE  VITALS: Temp: Temp: 98.1 °F (36.7 °C)Temp  Av.4 °F (36.9 °C)  Min: 98.1 °F (36.7 °C)  Max: 98.6 °F (37 °C) BP Systolic (94GVV), EEI:949 , Min:108 , JJM:892   Diastolic (04RRR), ZN, Min:83, Max:101   Pulse Pulse  Av.9  Min: 85  Max: 105 Resp Resp  Av.9  Min: 18  Max: 27 Pulse ox SpO2  Av.7 %  Min: 83 %  Max: 99 %      Physical Exam    GENERAL: alert, cooperative,  NAD  NEURO: A&Ox4  HEENT: NCAT, PERRLA  : normal  LUNGS: Unlabored breathing on 2L NC  HEART: normal rate and regular rhythm  ABDOMEN: Soft, nondistended, no TTP. Nonperitoneal without rigidity/rebound/guarding. FRANSISCO drain in LUQ with very dark sanguinous fluid consistent with old bleeding.   EXTREMITY: no cyanosis, clubbing or edema    Ins and outs: I/O last 3 completed shifts: In: 2452 [I.V.:2452]  Out: 2360 [Urine:2350; Drains:10]    Drain/tube output:  In: 2452 [I.V.:2452]  Out: 1860 [Urine:1850; Drains:10]    LAB:  CBC:   Recent Labs     07/31/21  0831 08/01/21  0638 08/02/21  0349   WBC 24.9* 18.1* 15.1*   HGB 9.8* 9.0* 9.1*   HCT 31.0* 27.8* 29.2*   MCV 92.3 91.7 93.6   * 474* 505*     BMP:   Recent Labs     07/31/21  0831 08/01/21  0638 08/02/21  0349    139 138   K 4.2 3.4* 3.7    100 102   CO2 24 24 24   BUN 5* 4* 3*   CREATININE 0.50 0.36* 0.38*   GLUCOSE 87 77 129*     COAGS:   Recent Labs     07/31/21  0831 08/01/21  0638   PROT 6.1* 5.5*       RADIOLOGY:  XR CHEST (SINGLE VIEW FRONTAL)    Result Date: 7/29/2021  Pulmonary congestion with bibasilar effusions and adjacent infiltrates. XR CHEST (SINGLE VIEW FRONTAL)    Result Date: 7/27/2021  Interval improvement in still mild interstitial edema with bilateral asymmetric, left greater than right, pleural effusions     CT ABDOMEN PELVIS W IV CONTRAST Additional Contrast? Oral and Rectal    Result Date: 7/30/2021  Increasing multiloculated rim enhancing fluid collections in the left upper quadrant. Slightly decreased size of the dominant pocket of fluid which now contains a percutaneous drainage catheter, though there are innumerable new smaller collections and other previously present smaller collections which are increased in size. I suspect many of these do not communicate with the pocket of fluid containing the drainage catheter. Excellent opacification of the colon with enteric contrast without definitive enteric contrast in the left upper quadrant collection to confirm a colonic source. Pancreatic source is a possible alternative consideration in the appropriate clinical setting. Persistent colonic wall thickening in the region of the hepatic flexure on a background of probable ileus.  Increased urinary bladder wall thickening with slightly increased perinephric stranding. Correlate with urinary labs for contributory urinary tract infection. CT ABDOMEN PELVIS W IV CONTRAST Additional Contrast? Radiologist Recommendation    Result Date: 7/27/2021  1. Large complex fluid collection in the left upper quadrant, concerning for abscess. This is likely related to the previously described colitis. 2. Consolidation and ground-glass opacity in both lower lobes with left pleural effusion, concerning for multifocal pneumonia. 3. Diffuse urinary bladder wall thickening. This could be due to underdistention, but correlation for any signs or symptoms of cystitis is recommended. 4. Chronic pancreatitis. 5. Nonobstructing left nephrolithiasis. MRI ABDOMEN W WO CONTRAST MRCP    Result Date: 7/31/2021  1. Suggestion of interstitial edematous pancreatitis in the pancreatic tail, consistent with acute on chronic pancreatitis given the CT appearance. 2. Approximately 13.8 cm x 6.5 cm x 2.3 cm heterogeneous collection in the left subphrenic space. Moderate fat necrosis could have this appearance, but the affected area is outside the pancreas with no findings of necrotizing pancreatitis. Additionally, the finding was absent less than 4 weeks prior. There is suspicion for continuity of the lesion with the main pancreatic duct in the tail, although this is incompletely evaluated due to only incomplete inclusion of the affected area on the 3D MRCP sequence. This suggest the possibility of fat necrosis from leaked pancreatic fluid. Abscess is an additional consideration. 3. Mild dilation of the upstream pancreatic duct with abrupt caliber change in the pancreatic neck potentially due to a filling defect such as calcification as seen on CT or a stricture. There are no findings suggestive of an obstructing malignancy. 4. Mild intrahepatic and extrahepatic biliary dilation of indeterminate etiology with no findings of choledocholithiasis.   Consider ERCP if there are clinical findings of cholestasis. 5. Left para-aortic lymphadenopathy is likely reactive. Recommend attention on follow-up imaging. US THORACENTESIS Which side should the procedure be performed? Left    Result Date: 7/31/2021  Successful ultrasound guided left thoracentesis. CT ABSCESS DRAINAGE    Result Date: 7/28/2021  Successful percutaneous ultrasound and CT guided placement of 8 [place] left upper quadrant drainage catheter. New high attenuation material within the multi loculated left upper quadrant fluid collection suggests leakage from the adjacent abnormal colonic segment indicating micro perforation. The above findings were called by Dr. Gustabo Robles MD to Dr. Katja Eastman On 7/28/2021 at 12:32. IR GUIDED THORACENTESIS PLEURAL    Result Date: 7/28/2021  Successful ultrasound guided thoracentesis. Shahida Barragan DO  8/2/2021  10:46 AM        Attending Note      I have reviewed the above [facility] note(s) and I either performed the key elements of the medical history and physical exam or was present with the trauma resident when the key elements of the medical history and physical exam were performed. I have discussed the findings, established the care plan and recommendations with the trauma team.  Stable, feeling better. No acute surgical intervention planned. Will sign off.   Contact if can Eleno Dickey MD  8/2/2021  11:45 AM

## 2021-08-02 NOTE — PROGRESS NOTES
[facility] -   Infectious diseases evaluation  admission date 7/21/2021    reason for consultation:   Diverticulosis/ leukocytosis and rash    Impression :   Current:  · bandemia  · Acute diverticulitis or L colitis  · Acute on chronic Pancreatitis of the tail w surrounding peripanc fluid collections and suspected pseudocysts  · Peripancreatic abd bloody fluid collection - drain placed 7/28 cx neg  · Lingular infiltrate  · Left large pleural effusion, reactive and cx neg 7/28   · Lactic acidosis  · 7/21/21 new Skin rash on the back - livedo reticularis   · CRP elevation  · Anxiety    Other:  · Copd w prednisone  · Hx astrocytoma age 24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 recurrent -  · SZ on tx  · Osteoporosis  · Migraine headache   · Chronic pancreatitis  Discussion / summary of stay / plan of care   ·   Recommendations   · zosyn -7/21 till 7/31 - stopped  · pancreatitis w many bella panc fluid collections and maybe pseudocysts  · GI - ERCP w panc stent and divert the pancreatic fluid, procedure deferred since the output in the FRANSISCO is much improved  · Discharge planning off antibiotics    Infection Control Recommendations   · [place] Precautions    Antimicrobial Stewardship Recommendations   · Simplification of therapy  · Targeted therapy    Coordination ofOutpatient Care:   · Estimated Length of IV antimicrobials:  · Patient will need Midline / picc Catheter Insertion:   · Patient will need SNF:  · Patient will need outpatient wound care:     History of Present Illness:   Initial history:  Pipo Moraes is a 45y.o.-year-old female w abd  And back pain and hospital and found T spine cp fracture acute and sub acute, no sx plans,   Found w lingular infiltrate and started on zosyn, for presumed aspiration pneumonia. Also on CTAP left diverticulitis and no abscess or perforation seen, but surround left colitis seen as well.     Started on zosyn since 7/21 and eating and feels better,m still a little tired and sleepy but eating. leukocytosis still elevated while getting steroids for COPD. Rash on the back noticed since prior to admission, unclear cause-    ID called for the leukocytosis and rash. On off - unclear if present x long time PTA since she lives by herself and the rash is only on the back and post right arm. She had fevers at admission and is down to LGF since    On exam she is very anxious and ox 3 - easily emotional              Interval changes  8/2/2021   Patient Vitals for the past 8 hrs:   BP Temp Temp src Pulse Resp SpO2   08/02/21 0930 -- -- -- -- -- (!) 83 %   08/02/21 0915 108/83 98.1 °F (36.7 °C) Oral 87 25 92 %     T max 101 on 7/28 -  Fever resolved since then  Abdomen is soft way less tender, not distended  FRANSISCO drain with very minimal amount,  She is pretty happy alert appropriate  GI deferring ERCP for now due to clinical improvement    MRI abd 7/31 shows panc tail pancreatitis, acute on chronic, w surrounding fluid collections. CT AP 7/30. No intest perforation and abscesses are larger and increased in number. CT AP show 7/27 left abd collection, and left pleura moderate size effusion - GS note appreciated    IR drainage: 7/28/21  · Pleural: PH   7.5, prot 3.6, LDH 2125, amylase 4100, WBC <3000, RBC 2125. cx neg    · abd fluid collection drain placed and fluid is serosang, , RBC 94323, amylase 404, ,  cx neg        Summary of relevant labs:  Labs:  W 19 - 17 - 19 - 17 - 20 - 25 - 18 - 15  Creat  0.68   - 269 - 291  Procalcitonin0.16   Lactic Acid 8.7    Micro:   BC 7/27    Imaging:  CT AP 7/30/21  Increasing multiloculated rim enhancing fluid collections in the left upper   quadrant. Slightly decreased size of the dominant pocket of fluid which now   contains a percutaneous drainage catheter, though there are innumerable new   smaller collections and other previously present smaller collections which   are increased in size.   I suspect many of these do not communicate with the   pocket of fluid containing the drainage catheter. Excellent opacification of the colon with enteric contrast without definitive   enteric contrast in the left upper quadrant collection to confirm a colonic   source. Pancreatic source is a possible alternative consideration in the   appropriate clinical setting. Persistent colonic wall thickening in the region of the hepatic flexure on a   background of probable ileus. Increased urinary bladder wall thickening with slightly increased perinephric   stranding. Correlate with urinary labs for contributory urinary tract   infection. CXR 7/27  Interval improvement in still mild interstitial edema with bilateral   asymmetric, left greater than right, pleural effusions     CT AP 7/27  Large complex fluid collection in the left upper quadrant, concerning for   abscess. This is likely related to the previously described colitis. 2. Consolidation and ground-glass opacity in both lower lobes with left   pleural effusion, concerning for multifocal pneumonia. 3. Diffuse urinary bladder wall thickening. This could be due to   underdistention, but correlation for any signs or symptoms of cystitis is   recommended. 4. Chronic pancreatitis. 5. Nonobstructing left nephrolithiasis. CT head 7/25  No acute intracranial abnormality. Suboccipital craniectomy. CT AP 7/21   Probable acute diverticulitis or colitis left colon. No evidence of perforation or abscess at this time. Chronic pancreatitis. Multiple compression fractures some of which are new since the previous exam.   Lingular ground-glass infiltrate. Recommend follow-up to resolution. Intra-atrial lipoma right atrium. Cardiac echo may be helpful. I have personally reviewed the past medical history, past surgical history, medications, social history, and family history, and I haveupdated the database accordingly.       Allergies:   Patient has no known allergies. Review of Systems:     Review of Systems   Constitutional: Negative for activity change, appetite change, chills, diaphoresis and fatigue. HENT: Negative for congestion and rhinorrhea. Eyes: Negative for photophobia, pain, discharge and visual disturbance. Respiratory: Negative for apnea, cough and choking. Cardiovascular: Negative for chest pain and leg swelling. Gastrointestinal: Positive for abdominal pain. Negative for abdominal distention. Endocrine: Negative for heat intolerance and polyphagia. Genitourinary: Negative for dysuria and flank pain. Musculoskeletal: Negative for arthralgias. Skin: Positive for rash. Negative for color change. Allergic/Immunologic: Negative for immunocompromised state. Neurological: Negative for dizziness, tremors, seizures, speech difficulty and headaches. Hematological: Negative for adenopathy. Psychiatric/Behavioral: Negative for agitation. The patient is not nervous/anxious. Physical Examination :       Physical Exam  Constitutional:       General: She is not in acute distress. Appearance: Normal appearance. She is not ill-appearing, toxic-appearing or diaphoretic. HENT:      Head: Normocephalic and atraumatic. Nose: Nose normal.      Mouth/Throat:      Mouth: Mucous membranes are moist.   Eyes:      General: No scleral icterus. Pupils: Pupils are equal, round, and reactive to light. Cardiovascular:      Rate and Rhythm: Normal rate and regular rhythm. Heart sounds: Normal heart sounds. No murmur heard. No friction rub. No gallop. Pulmonary:      Effort: No respiratory distress. Breath sounds: Normal breath sounds. No stridor. No rhonchi. Abdominal:      General: There is no distension. Palpations: Abdomen is soft. Tenderness: There is abdominal tenderness. Right CVA tenderness: left lower quadrant.    Genitourinary:     Comments: No helms  Musculoskeletal:         General: No swelling, deformity or signs of injury. Cervical back: Neck supple. No rigidity or tenderness. Right lower leg: No edema. Left lower leg: No edema. Skin:     General: Skin is dry. Coloration: Skin is not jaundiced or pale. Findings: No bruising, erythema, lesion or rash. Neurological:      General: No focal deficit present. Mental Status: She is alert and oriented to person, place, and time. Cranial Nerves: No cranial nerve deficit. Sensory: No sensory deficit. Psychiatric:         Mood and Affect: Mood normal.         Thought Content:  Thought content normal.         Past Medical History:     Past Medical History:   Diagnosis Date    Acute respiratory failure with hypoxia ([address] 10/16/2020    Alcohol withdrawal syndrome, with delirium ([address] 12/14/2019    Alcoholism ([address]     Anemia 10/2020    GI bleed    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24, the patient underwent 2 surgical resections without known recurrence 10/23/2020    Closed fracture of lateral portion of left tibial plateau 64/49/4007    COPD (chronic obstructive pulmonary disease) ([address]     CO2 retainer, on Bipap at night for this, Dr. Rosendo Torres ( last visit 11/20/2020 and note on chart )    Depression     bipolar, major depressive disorder, ptsd, anxiety    Dysphagia     GI bleed 10/2020    Hypertension     Memory loss     Oxygen dependent     pt stated not needed as of 12/9/2020    Pain, joint, ankle and foot     Pancreatic lesion 10/2020    Dr. Em Most working up pt    Peripheral neuropathy     Se[address]ures [address] [address]     also baseline tremors-last sz summer 2020    Tension headache     Under care of team 07/01/2021    neuro-Dr Wan-[facility]-last visit june 2021    Under care of team 07/01/2021    pain management-Hamzah Martines-nery jimenez-last visit june 2021    Under care of team 07/01/2021    pulmonology-Dr Dueñas-[facility]-last virtual visit feb 2021    Under care of team 07/01/2021 psych-bahnfeldt NP-telemed-last visit may 2021    Under care of team 07/01/2021    oi-[address]-last visit june 2021    Wellness examination 07/01/2021    pcp-Ludivina grimes-last visit may 2021       Past Surgical  History:     Past Surgical History:   Procedure Laterality Date    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    COLONOSCOPY N/A 10/22/2020    COLONOSCOPY DIAGNOSTIC performed by Kristin Kussmaul, MD at [facility]    [address]  7/28/2021    CT ABSCESS DRAIN SUBCUTANEOUS 7/28/2021 [facility] CT SCAN    ENDOSCOPIC ULTRASOUND (LOWER) N/A 12/9/2020    ENDOSCOPIC ULTRASOUND, UPPER WITH LINEAR SCOPE FOR BIOPSY OF MASS ON HEAD OF PANCREAS performed by Lizbeth Martinez MD at [address] Left 7-3-13    ORIF tibial plateau    FRACTURE SURGERY Right     small finger metacarpal fracture    HAND SURGERY      pins    HYSTERECTOMY  2003    UPPER GASTROINTESTINAL ENDOSCOPY N/A 10/22/2020    EGD BIOPSY performed by Kristin Kussmaul, MD at [address] [place] [address] N/A 4/5/2021    EGD BIOPSY performed by Kristin Kussmaul, MD at [facility]       Medications:      busPIRone  20 mg Oral TID    tiotropium  2 puff Inhalation Daily    sodium chloride flush  5-40 mL Intravenous BID    pantoprazole  40 mg Oral QAM AC    guaiFENesin  600 mg Oral BID    nicotine  1 patch Transdermal Daily    lidocaine  1 patch Transdermal Daily    spironolactone  50 mg Oral Daily    polyethylene glycol  17 g Oral BID    carBAMazepine  200 mg Oral TID    topiramate  50 mg Oral BID    amLODIPine  5 mg Oral Daily    baclofen  10 mg Oral TID    budesonide-formoterol  2 puff Inhalation BID    ferrous sulfate  325 mg Oral BID    folic acid  1 mg Oral Daily    potassium chloride  20 mEq Oral Daily with breakfast    pregabalin  200 mg Oral TID    rOPINIRole  1 mg Oral Nightly    sodium chloride flush  5-40 mL Intravenous 2 times per day    enoxaparin  40 mg Subcutaneous Daily       Social History:     Social History     Socioeconomic History    Marital status: Single     Spouse name: Not on file    Number of children: Not on file    Years of education: Not on file    Highest education level: Not on file   Occupational History    Not on file   Tobacco Use    Smoking status: Current Every Day Smoker     Packs/day: 0.50     Years: 30.00     Pack years: 15.00     Types: Cigarettes    Smokeless tobacco: Never Used    Tobacco comment: plans on quitting in the future    Vaping Use    Vaping Use: Never used   Substance and Sexual Activity    Alcohol use: Not Currently     Alcohol/week: 0.0 standard drinks    Drug use: Yes     Frequency: 2.0 times per week     Types: Marijuana    Sexual activity: Not on file   Other Topics Concern    Not on file   Social History Narrative    Not on file     Social Determinants of Health     Financial Resource Strain: Medium Risk    Difficulty of Paying Living Expenses: Somewhat hard   Food Insecurity: No Food Insecurity    Worried About Running Out of Food in the Last Year: Never true    [place] of Food in the Last Year: Never true   Transportation Needs:     Lack of Transportation (Medical):      Lack of Transportation (Non-Medical):    Physical Activity:     Days of Exercise per Week:     Minutes of Exercise per Session:    Stress:     Feeling of Stress :    Social Connections:     Frequency of Communication with Friends and Family:     Frequency of Social Gatherings with Friends and Family:     Attends Rastafarian Services:     Active Member of Clubs or Organizations:     Attends Club or Organization Meetings:     Marital Status:    Intimate Partner Violence:     Fear of Current or Ex-Partner:     Emotionally Abused:     Physically Abused:     Sexually Abused:        Family History:     Family History   Problem Relation Age of Onset    Other Father     Cancer Maternal Grandmother     Heart Disease Paternal Grandmother     Esophageal Cancer Maternal Aunt       Medical Decision Making:   I have independently reviewed/ordered the following labs:    CBC with Differential:   Recent Labs     08/01/21 0638 08/02/21  0349   WBC 18.1* 15.1*   HGB 9.0* 9.1*   HCT 27.8* 29.2*   * 505*   LYMPHOPCT 11* 14*   MONOPCT 8 8     BMP:  Recent Labs     07/30/21  1850 07/30/21 2015 07/31/21 0831 08/01/21  0638 08/02/21  0349   NA  --   --    < > 139 138   K  --  3.9   < > 3.4* 3.7   CL  --   --    < > 100 102   CO2  --   --    < > 24 24   BUN  --   --    < > 4* 3*   CREATININE  --   --    < > 0.36* 0.38*   MG 1.9 1.9  --   --   --     < > = values in this interval not displayed. Hepatic Function Panel:   Recent Labs     07/31/21 0831 08/01/21 0638   PROT 6.1* 5.5*   LABALBU 2.6* 2.3*   BILIDIR 0.31* 0.22   IBILI 0.16 0.13   BILITOT 0.47 0.35   ALKPHOS 440* 358*   ALT 21 15   AST 33* 20     No results for input(s): RPR in the last 72 hours. No results for input(s): HIV in the last 72 hours. No results for input(s): BC in the last 72 hours. Lab Results   Component Value Date    CREATININE 0.38 08/02/2021    GLUCOSE 129 08/02/2021    GLUCOSE 92 04/30/2012       Detailed results: Thank you for allowing us to participate in the care of this patient. Please call with questions. This note is created with the assistance of a speech recognition program.  While intending to generate adocument that actually reflects the content of the visit, the document can still have some errors including those of syntax and sound a like substitutions which may escape proof reading. It such instances, actual meaningcan be extrapolated by contextual diversion.     Quintin Woodall MD  Office: [phone]  Perfect serve / office [phone]

## 2021-08-02 NOTE — PROGRESS NOTES
Physical Therapy  Facility/Department: [facility]  Daily Treatment Note  NAME: Steve Gramajo  : 1970  MRN: [id number]    Date of Service: 2021    Discharge Recommendations:  Patient would benefit from continued therapy after discharge   PT Equipment Recommendations  Equipment Needed: Yes  Mobility Devices: Marina Estrin: Rolling    Assessment   Body structures, Functions, Activity limitations: Decreased functional mobility ; Decreased strength;Decreased endurance;Decreased balance;Decreased safe awareness  Assessment: Pt grossly CGA for all functional mobility this date. Pt ambulated ~500ft with RW and CGA, requiring ~5 standing rest breaks throughout for endurance recovery. Pt steady throughout ambulation with no LOB noted. Would benefit from further PT to address further functional mobility deficits and return to prior level of independence. Prognosis: Good  PT Education: Plan of Care;PT Role;General Safety;Transfer Training;Gait Training  REQUIRES PT FOLLOW UP: Yes  Activity Tolerance  Activity Tolerance: Patient Tolerated treatment well;Patient limited by fatigue;Patient limited by endurance     Patient Diagnosis(es): The primary encounter diagnosis was Septicemia ([address] Diagnoses of Compression fracture of thoracic vertebra, initial encounter, unspecified thoracic vertebral level (HCC) and Compression fracture of lumbar vertebra, initial encounter, unspecified lumbar vertebral level ([address] were also pertinent to this visit.      has a past medical history of Acute respiratory failure with hypoxia ([address] Alcohol withdrawal syndrome, with delirium ([address] Alcoholism ([address] Anemia, 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24, the patient underwent 2 surgical resections without known recurrence, Closed fracture of lateral portion of left tibial plateau, COPD (chronic obstructive pulmonary disease) ([address] Depression, Dysphagia, GI bleed, Hypertension, Memory loss, Oxygen dependent, Pain, joint, ankle and foot, Pancreatic lesion, Peripheral neuropathy, Seizures ([facility] [address] Tension headache, Under care of team, Under care of team, Under care of team, Under care of team, Under care of team, and Wellness examination. has a past surgical history that includes Hysterectomy (2003); Brain tumor excision (1989); fracture surgery (Left, 7-3-13); fracture surgery (Right); Upper gastrointestinal endoscopy (N/A, 10/22/2020); Colonoscopy (N/A, 10/22/2020); Endoscopic ultrasonography, GI (N/A, 12/9/2020); Upper gastrointestinal endoscopy (N/A, 4/5/2021); Hand surgery; and CT ABSCESS DRAINAGE (7/28/2021). Restrictions  Restrictions/Precautions  Restrictions/Precautions: Fall Risk, Up as Tolerated, Seizure  Required Braces or Orthoses?: No  Position Activity Restriction  Other position/activity restrictions: no activity restrictions per Julius Mohs with NS. Maintain SpO2 >94%, O2 NC 4 Lm. up with assist  Subjective   General  Chart Reviewed: Yes  Response To Previous Treatment: Patient with no complaints from previous session. Family / Caregiver Present: No  Subjective  Subjective: Pt and RN agreeable to PT this afternoon. Pt supine in bed upon arrival. Very pleasant and cooperative throughout session. General Comment  Comments: Pt retired to chair at end of session with call light in reach. Pain Screening  Patient Currently in Pain: Yes  Pain Assessment  Pain Assessment: 0-10  Pain Level: 5  Patient's Stated Pain Goal: No pain  Pain Type: Acute pain;Chronic pain  Pain Location: Back  Pain Orientation: Left; Lower  Vital Signs  Patient Currently in Pain: Yes       Orientation  Orientation  Overall Orientation Status: Within Normal Limits  Cognition   Cognition  Overall Cognitive Status: WFL  Objective   Bed mobility  Rolling to Left: Contact guard assistance  Rolling to Right: Contact guard assistance  Supine to Sit: Stand by assistance  Sit to Supine: Unable to assess (Pt retired to chair at end of session.)  Scooting: Stand by assistance  Comment: HOB elevated, utilized bed rails. Pt rolled side to side in bed to perform breif change prior to mobility. Transfers  Sit to Stand: Contact guard assistance  Stand to sit: Contact guard assistance  Comment: RW used, cues provided for correct hand placement with good return demo. Ambulation  Ambulation?: Yes  Ambulation 1  Surface: level tile  Device: Rolling Walker  Assistance: Contact guard assistance  Quality of Gait: steady throughout, no LOB. Gait Deviations: Slow Leticia;Decreased step length  Distance: 500ft  Comments: Pt required ~5 total standing rest breaks for endurance recovery. SPO2 maintained above 90% thorughout ambulation on 4L O2. Stairs/Curb  Stairs?: No     Balance  Posture: Good  Sitting - Static: Good  Sitting - Dynamic: Good;-  Standing - Static: Fair;+  Standing - Dynamic: Fair  Comments: RW used while assessing standing balance  Exercises  Hip Flexion: seated marches: x10  Hip Abduction: x10  Knee Long Arc Quad: x10  Ankle Pumps: x10  Comments: LE exercises performed while seated in chair.         Goals  Short term goals  Time Frame for Short term goals: 14 visits  Short term goal 1: Pt will be [place] bed mobility  Short term goal 2: Pt will be [place] transfers  Short term goal 3: Pt will be [place] amb 200' rW or least restrictive AD  Short term goal 4: Pt will navigate 3 steps [place]    Plan    Plan  Times per week: 5-6x/wk  Current Treatment Recommendations: Strengthening, Balance Training, Endurance Training, Functional Mobility Training, Transfer Training, Gait Training, Stair training, Home Exercise Program, Safety Education & Training, Patient/Caregiver Education & Training, Equipment Evaluation, Education, & procurement  Safety Devices  Type of devices: Call light within reach, Nurse notified, Gait belt, Patient at risk for falls, All fall risk precautions in place, Left in chair  Restraints  Initially in place: No     Therapy Time   Individual Concurrent Group Co-treatment   Time In 4635         Time Out 1445         Minutes 51         Timed Code Treatment Minutes: [address] [place], [place]

## 2021-08-03 VITALS
SYSTOLIC BLOOD PRESSURE: 115 MMHG | WEIGHT: 137.79 LBS | HEART RATE: 96 BPM | DIASTOLIC BLOOD PRESSURE: 91 MMHG | HEIGHT: 65 IN | RESPIRATION RATE: 25 BRPM | BODY MASS INDEX: 22.96 KG/M2 | TEMPERATURE: 98.2 F | OXYGEN SATURATION: 95 %

## 2021-08-03 LAB
ABSOLUTE EOS #: 0.05 K/UL (ref 0–0.44)
ABSOLUTE IMMATURE GRANULOCYTE: 0.31 K/UL (ref 0–0.3)
ABSOLUTE LYMPH #: 2.71 K/UL (ref 1.1–3.7)
ABSOLUTE MONO #: 1.39 K/UL (ref 0.1–1.2)
ANION GAP SERPL CALCULATED.3IONS-SCNC: 11 MMOL/L (ref 9–17)
BASOPHILS # BLD: 0 % (ref 0–2)
BASOPHILS ABSOLUTE: 0.04 K/UL (ref 0–0.2)
BUN BLDV-MCNC: 3 MG/DL (ref 6–20)
BUN/CREAT BLD: ABNORMAL (ref 9–20)
CALCIUM SERPL-MCNC: 8.8 MG/DL (ref 8.6–10.4)
CHLORIDE BLD-SCNC: 105 MMOL/L (ref 98–107)
CO2: 24 MMOL/L (ref 20–31)
CREAT SERPL-MCNC: 0.48 MG/DL (ref 0.5–0.9)
CULTURE: ABNORMAL
DIFFERENTIAL TYPE: ABNORMAL
DIRECT EXAM: ABNORMAL
EOSINOPHILS RELATIVE PERCENT: 0 % (ref 1–4)
GFR AFRICAN AMERICAN: >60 ML/MIN
GFR NON-AFRICAN AMERICAN: >60 ML/MIN
GFR SERPL CREATININE-BSD FRML MDRD: ABNORMAL ML/MIN/{1.73_M2}
GFR SERPL CREATININE-BSD FRML MDRD: ABNORMAL ML/MIN/{1.73_M2}
GLUCOSE BLD-MCNC: 108 MG/DL (ref 70–99)
HCT VFR BLD CALC: 28.1 % (ref 36.3–47.1)
HEMOGLOBIN: 9 G/DL (ref 11.9–15.1)
IMMATURE GRANULOCYTES: 2 %
LYMPHOCYTES # BLD: 16 % (ref 24–43)
Lab: ABNORMAL
MCH RBC QN AUTO: 28.9 PG (ref 25.2–33.5)
MCHC RBC AUTO-ENTMCNC: 32 G/DL (ref 28.4–34.8)
MCV RBC AUTO: 90.4 FL (ref 82.6–102.9)
MONOCYTES # BLD: 8 % (ref 3–12)
NRBC AUTOMATED: 0 PER 100 WBC
PDW BLD-RTO: 13.1 % (ref 11.8–14.4)
PLATELET # BLD: 539 K/UL (ref 138–453)
PLATELET ESTIMATE: ABNORMAL
PMV BLD AUTO: 9.9 FL (ref 8.1–13.5)
POTASSIUM SERPL-SCNC: 3.5 MMOL/L (ref 3.7–5.3)
RBC # BLD: 3.11 M/UL (ref 3.95–5.11)
RBC # BLD: ABNORMAL 10*6/UL
SEG NEUTROPHILS: 74 % (ref 36–65)
SEGMENTED NEUTROPHILS ABSOLUTE COUNT: 12.71 K/UL (ref 1.5–8.1)
SODIUM BLD-SCNC: 140 MMOL/L (ref 135–144)
SPECIMEN DESCRIPTION: ABNORMAL
WBC # BLD: 17.2 K/UL (ref 3.5–11.3)
WBC # BLD: ABNORMAL 10*3/UL

## 2021-08-03 PROCEDURE — 36415 COLL VENOUS BLD VENIPUNCTURE: CPT

## 2021-08-03 PROCEDURE — 1800000000 HC LEAVE OF ABSENCE

## 2021-08-03 PROCEDURE — 6370000000 HC RX 637 (ALT 250 FOR IP): Performed by: FAMILY MEDICINE

## 2021-08-03 PROCEDURE — 80048 BASIC METABOLIC PNL TOTAL CA: CPT

## 2021-08-03 PROCEDURE — 6370000000 HC RX 637 (ALT 250 FOR IP): Performed by: NURSE PRACTITIONER

## 2021-08-03 PROCEDURE — 97116 GAIT TRAINING THERAPY: CPT

## 2021-08-03 PROCEDURE — 2580000003 HC RX 258: Performed by: STUDENT IN AN ORGANIZED HEALTH CARE EDUCATION/TRAINING PROGRAM

## 2021-08-03 PROCEDURE — 6360000002 HC RX W HCPCS: Performed by: NURSE PRACTITIONER

## 2021-08-03 PROCEDURE — 97530 THERAPEUTIC ACTIVITIES: CPT

## 2021-08-03 PROCEDURE — 6370000000 HC RX 637 (ALT 250 FOR IP): Performed by: STUDENT IN AN ORGANIZED HEALTH CARE EDUCATION/TRAINING PROGRAM

## 2021-08-03 PROCEDURE — 99232 SBSQ HOSP IP/OBS MODERATE 35: CPT | Performed by: INTERNAL MEDICINE

## 2021-08-03 PROCEDURE — 2580000003 HC RX 258: Performed by: NURSE PRACTITIONER

## 2021-08-03 PROCEDURE — APPSS45 APP SPLIT SHARED TIME 31-45 MINUTES: Performed by: NURSE PRACTITIONER

## 2021-08-03 PROCEDURE — 2700000000 HC OXYGEN THERAPY PER DAY

## 2021-08-03 PROCEDURE — 6360000002 HC RX W HCPCS: Performed by: STUDENT IN AN ORGANIZED HEALTH CARE EDUCATION/TRAINING PROGRAM

## 2021-08-03 PROCEDURE — 85025 COMPLETE CBC W/AUTO DIFF WBC: CPT

## 2021-08-03 PROCEDURE — 94761 N-INVAS EAR/PLS OXIMETRY MLT: CPT

## 2021-08-03 PROCEDURE — 99239 HOSP IP/OBS DSCHRG MGMT >30: CPT | Performed by: STUDENT IN AN ORGANIZED HEALTH CARE EDUCATION/TRAINING PROGRAM

## 2021-08-03 PROCEDURE — 97110 THERAPEUTIC EXERCISES: CPT

## 2021-08-03 PROCEDURE — 6370000000 HC RX 637 (ALT 250 FOR IP): Performed by: PHYSICIAN ASSISTANT

## 2021-08-03 PROCEDURE — 94640 AIRWAY INHALATION TREATMENT: CPT

## 2021-08-03 RX ORDER — TOPIRAMATE 50 MG/1
50 TABLET, FILM COATED ORAL 2 TIMES DAILY
Qty: 60 TABLET | Refills: 0 | Status: SHIPPED | OUTPATIENT
Start: 2021-08-03 | End: 2021-09-03 | Stop reason: SDUPTHER

## 2021-08-03 RX ORDER — NICOTINE 21 MG/24HR
1 PATCH, TRANSDERMAL 24 HOURS TRANSDERMAL DAILY
Qty: 30 PATCH | Refills: 0 | Status: SHIPPED | OUTPATIENT
Start: 2021-08-04 | End: 2021-10-04

## 2021-08-03 RX ORDER — TRAZODONE HYDROCHLORIDE 50 MG/1
50 TABLET ORAL NIGHTLY PRN
Qty: 30 TABLET | Refills: 0 | Status: SHIPPED | OUTPATIENT
Start: 2021-08-03 | End: 2021-08-05 | Stop reason: SDUPTHER

## 2021-08-03 RX ORDER — SPIRONOLACTONE 50 MG/1
50 TABLET, FILM COATED ORAL DAILY
Qty: 30 TABLET | Refills: 0 | Status: SHIPPED | OUTPATIENT
Start: 2021-08-04 | End: 2021-09-03 | Stop reason: SDUPTHER

## 2021-08-03 RX ORDER — BUSPIRONE HYDROCHLORIDE 10 MG/1
20 TABLET ORAL 3 TIMES DAILY
Qty: 60 TABLET | Refills: 0 | Status: SHIPPED | OUTPATIENT
Start: 2021-08-03 | End: 2021-08-05 | Stop reason: SDUPTHER

## 2021-08-03 RX ORDER — CARBAMAZEPINE 200 MG/1
200 TABLET ORAL 3 TIMES DAILY
Qty: 90 TABLET | Refills: 0 | Status: SHIPPED | OUTPATIENT
Start: 2021-08-03 | End: 2021-09-03 | Stop reason: SDUPTHER

## 2021-08-03 RX ORDER — BETHANECHOL CHLORIDE 5 MG
10 TABLET ORAL ONCE
Status: COMPLETED | OUTPATIENT
Start: 2021-08-03 | End: 2021-08-03

## 2021-08-03 RX ORDER — HYDROXYZINE HYDROCHLORIDE 25 MG/1
25 TABLET, FILM COATED ORAL 3 TIMES DAILY PRN
Qty: 30 TABLET | Refills: 0 | Status: SHIPPED | OUTPATIENT
Start: 2021-08-03 | End: 2021-08-05 | Stop reason: SDUPTHER

## 2021-08-03 RX ADMIN — SODIUM CHLORIDE, PRESERVATIVE FREE 5 ML: 5 INJECTION INTRAVENOUS at 08:34

## 2021-08-03 RX ADMIN — SPIRONOLACTONE 50 MG: 25 TABLET ORAL at 08:31

## 2021-08-03 RX ADMIN — PANTOPRAZOLE SODIUM 40 MG: 40 TABLET, DELAYED RELEASE ORAL at 06:12

## 2021-08-03 RX ADMIN — BUDESONIDE AND FORMOTEROL FUMARATE DIHYDRATE 2 PUFF: 160; 4.5 AEROSOL RESPIRATORY (INHALATION) at 07:56

## 2021-08-03 RX ADMIN — BUSPIRONE HYDROCHLORIDE 20 MG: 10 TABLET ORAL at 08:26

## 2021-08-03 RX ADMIN — FOLIC ACID 1 MG: 1 TABLET ORAL at 08:30

## 2021-08-03 RX ADMIN — CARBAMAZEPINE 200 MG: 200 TABLET ORAL at 14:34

## 2021-08-03 RX ADMIN — BUSPIRONE HYDROCHLORIDE 20 MG: 10 TABLET ORAL at 14:33

## 2021-08-03 RX ADMIN — GUAIFENESIN 600 MG: 600 TABLET, EXTENDED RELEASE ORAL at 08:36

## 2021-08-03 RX ADMIN — FENTANYL CITRATE 50 MCG: 50 INJECTION, SOLUTION INTRAMUSCULAR; INTRAVENOUS at 12:47

## 2021-08-03 RX ADMIN — BETHANECHOL CHLORIDE 10 MG: 5 TABLET ORAL at 15:00

## 2021-08-03 RX ADMIN — PREGABALIN 200 MG: 100 CAPSULE ORAL at 15:02

## 2021-08-03 RX ADMIN — CARBAMAZEPINE 200 MG: 200 TABLET ORAL at 08:30

## 2021-08-03 RX ADMIN — ENOXAPARIN SODIUM 40 MG: 40 INJECTION SUBCUTANEOUS at 08:29

## 2021-08-03 RX ADMIN — TOPIRAMATE 50 MG: 25 TABLET, FILM COATED ORAL at 08:27

## 2021-08-03 RX ADMIN — ACETAMINOPHEN 650 MG: 325 TABLET ORAL at 12:00

## 2021-08-03 RX ADMIN — TIOTROPIUM BROMIDE INHALATION SPRAY 2 PUFF: 3.12 SPRAY, METERED RESPIRATORY (INHALATION) at 07:56

## 2021-08-03 RX ADMIN — FENTANYL CITRATE 25 MCG: 50 INJECTION, SOLUTION INTRAMUSCULAR; INTRAVENOUS at 08:52

## 2021-08-03 RX ADMIN — FENTANYL CITRATE 25 MCG: 50 INJECTION, SOLUTION INTRAMUSCULAR; INTRAVENOUS at 01:15

## 2021-08-03 RX ADMIN — AMLODIPINE BESYLATE 5 MG: 5 TABLET ORAL at 08:36

## 2021-08-03 RX ADMIN — POTASSIUM CHLORIDE 20 MEQ: 1500 TABLET, EXTENDED RELEASE ORAL at 11:56

## 2021-08-03 RX ADMIN — PREGABALIN 200 MG: 100 CAPSULE ORAL at 08:33

## 2021-08-03 RX ADMIN — FERROUS SULFATE TAB EC 325 MG (65 MG FE EQUIVALENT) 325 MG: 325 (65 FE) TABLET DELAYED RESPONSE at 08:30

## 2021-08-03 RX ADMIN — BACLOFEN 10 MG: 10 TABLET ORAL at 08:28

## 2021-08-03 RX ADMIN — BACLOFEN 10 MG: 10 TABLET ORAL at 14:34

## 2021-08-03 RX ADMIN — NICOTINE POLACRILEX 2 MG: 2 GUM, CHEWING BUCCAL at 08:26

## 2021-08-03 RX ADMIN — POTASSIUM CHLORIDE 40 MEQ: 1500 TABLET, EXTENDED RELEASE ORAL at 07:16

## 2021-08-03 ASSESSMENT — PAIN DESCRIPTION - ORIENTATION: ORIENTATION: LEFT

## 2021-08-03 ASSESSMENT — ENCOUNTER SYMPTOMS
ABDOMINAL PAIN: 0
RHINORRHEA: 0
EYE DISCHARGE: 0
COLOR CHANGE: 0
NAUSEA: 0
ABDOMINAL PAIN: 1
ABDOMINAL DISTENTION: 0
CHOKING: 0
WHEEZING: 0
APNEA: 0
COUGH: 0
SHORTNESS OF BREATH: 0
DIARRHEA: 0
PHOTOPHOBIA: 0
SHORTNESS OF BREATH: 1
EYE PAIN: 0
COUGH: 1

## 2021-08-03 ASSESSMENT — PAIN DESCRIPTION - LOCATION: LOCATION: ABDOMEN

## 2021-08-03 ASSESSMENT — PAIN SCALES - GENERAL
PAINLEVEL_OUTOF10: 7
PAINLEVEL_OUTOF10: 6
PAINLEVEL_OUTOF10: 6
PAINLEVEL_OUTOF10: 0
PAINLEVEL_OUTOF10: 6
PAINLEVEL_OUTOF10: 0
PAINLEVEL_OUTOF10: 3

## 2021-08-03 ASSESSMENT — PAIN DESCRIPTION - DESCRIPTORS: DESCRIPTORS: ACHING;DISCOMFORT

## 2021-08-03 NOTE — DISCHARGE SUMMARY
[facility]  Office: [address], DO, Crystal Federico, DO, Chi Reyes, DO, Hima Huang, DO, Pasquale Vraela MD, Heather Hendrickson MD, Marvin Krishnamurthy MD, Reagan Leija MD, Funmilayo Granados MD, Angeline Castillo MD, Main Lara MD, Naa Sawant, DO, Riaz Kelly MD, Regine Jones, DO, Harrietta Goldberg, MD,  Stephon Jeong DO, Jeanne Schwartz MD, Ragini Cheng MD, Nita Aguila MD, Palomo Khalil MD, Jacqueline Felipe MD, Raffi Chang MD, Daniel Macias MD, Starr Silverman, [facility], [facility], CNP, Shantel Vivas, CNP, Florian Burris, CNS, Maine Zurita, CNP, Driss Ray, CNP, Conor Quiñones, CNP, Donna Dobbs, CNP, Khris Watts, CNP, Tawny Dawson PA-C, Audra Marcano DNP, [facility], CNP, Aashish Cohen, CNP, Varghese [facility], CNP, Cade Plush, CNP, Rogelio Reid, CNP, Jessy Rodgers, CNP, Kristal Simmons, CNP, Ignacio Willard, [facility]    Discharge Summary     Patient ID: Abran Yeboah  :  1970   MRN: [id number]     ACCOUNT:  [de-identified]   Patient's PCP: Rupal Zepeda MD  Admit Date: 2021   Discharge Date: 8/3/2021     Length of Stay: 13  Code Status:  Full Code  Admitting Physician: No admitting provider for patient encounter.   Discharge Physician: LOI Pena NP     Active Discharge Diagnoses:     Hospital Problem Lists:  Principal Problem:    Severe sepsis [facility])  Active Problems:    Essential hypertension    Tobacco use    Seizure disorder ([facility]    COPD with acute exacerbation ([facility]    Acute respiratory failure with hypoxia ([facility]    Recurrent major depressive disorder ([facility]    Astrocytoma ([facility] - 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24, the patient underwent 2 surgical resections without known recurrence    Metabolic encephalopathy    AMAN (generalized anxiety disorder)    Major depressive disorder, recurrent severe without psychotic features ([facility]    Chronic peripheral neuropathic pain    History of alcohol abuse    Personal history of astrocytoma    Marijuana abuse    Closed fracture of fifth thoracic vertebra (HCC)    Diverticulitis of large intestine with abscess without bleeding    Elevated brain natriuretic peptide (BNP) level    Elevated alkaline phosphatase level    Chronic pancreatitis (HCC)    Erythema ab igne    Compression fracture of thoracic vertebra (HCC)    Compression of lumbar vertebra (HCC)    Metabolic acidosis with increased anion gap and accumulation of organic acids    Community acquired pneumonia of left lower lobe of lung    Septicemia ([address]    Alcohol-induced acute pancreatitis    Fluid collection of pancreas    Diverticulitis of large intestine without perforation or abscess with bleeding    Pseudocyst of pancreas    Bandemia  Resolved Problems:    * No resolved hospital problems. *      Admission Condition:  fair     Discharged Condition: fair    Hospital Stay:     Hospital Course:  Crisoforo Snellen is a 45 y.o. female who was admitted for the management of   Severe sepsis ([facility] , presented to ER with Back Pain (states chronic back pain becoming worse.  has recently started physical therapy)    This is a 48-year-old female with underlying history of hypertension, COPD, anxiety/depression, UTI, thoracic compression fractures, peripheral neuropathy, migraine headaches, seizure disorder, and alcoholism (last drink 6 months ago) who presents the emergency department with generalized malaise, headache and abdominal/back pain.    Initial blood work revealed leukocytosis, elevated alkaline phosphatase, she was tachycardic and hypoxic as well, imaging revealed picture of acute diverticulitis/colitis of left colon without peripheral abscess along with chronic pancreatitis, CT spine revealed stable T7-T9 and T11 compression fracture, worsening of compression fracture of T6 and new T5 and superior L5 subacute fracture. Admitted to the hospital for sepsis related to diverticulitis.  Started on zosyn. Neurosurgery consulted regarding compression fractures, no surgical intervention inpatient. Pt condition continued to worsen. Chest x-ray suggestive of pneumonia. Pulmonology consulted. Hi flow nasal cannula initiated in conjunction with steroids. ID consulted for abx recommendations.      7/27: pt continued to deteriorate clinically. Repeat CT of the abdomen revealed LUQ abscess, left pleural effusion. IR consulted for thoracentesis and intraabdominal abscess drainage. General surgery consulted for intraabdominal abscess.      7/28: IR guided drain placed for intraabdominal abscess. Thoracentesis completed, 400 mL fluid removed. Exudative based on Light's criteria.     Patient seen and evaluated in room much improved from yesterday  Labs, vitals, notes reviewed  Vital signs stable overnight  Tolerating p.o. intake decently, calorie count underway with nutritionist guided support  Awaiting GI input on plan for possible EUS, FNA and ERCP  Diet advancing as tolerated    Significant therapeutic interventions:     Acute on chronic pancreatitis:   -GI following. -MRCP showing phrenic space fluid collection suspicion for continuity of lesion with the main pancreatic duct in the tail upstream dilation and leakage of pancreatic fluid  -Plan for EUS to evaluate for stricture and rule out mass and possible FNA with ERCP and pancreatic duct stent placement to channel PD flow into small bowel  -With associated fluid collection in the left upper quadrant  - pancreatic fluid on thoracentesis  -Continue CLD, advance as tolerated-nutritionist consulted for strict calorie counting.      TME:   - With lactic acidosis initially on presentation.    - Complicated by abdominal abscess and history of EtOH abuse. - resolved     Diverticulitis with abscess:   - With likely small microperforation.    - Continue Zosyn. - General surgery/infectious disease following.    - Abscess drained with IR and PERC drain placed.     - Continues with bloody gelatinous drainage.     Sepsis:   -Improving  -Responding well to antibiotic therapy  - Continue LR.     Essential hypertension: stable  - Norvasc, spironolactone     Exudative pleural effusion:   - Pulmonary following  -Continue to monitor for recurrance  -S/P thoracentesis x2   -Chest x-ray reviewed from 8/2 showing no interval recurrence     Seizure disorder:   - Without breakthrough seizure. - EEG completed showing no abnormal activity.    - On Tegretol, Ativan for breakthrough seizures. - Continue seizure precautions.    - Neurology initially following, signed off.    - Follow-up with Dr. Cyrus Boo in 4 to 6 weeks     Back pain with peripheral neuropathy:   - With history of chronic L4-5 radiculopathy and osteoporosis. - No fracture at T5 and L5 with chronic compression fracture with worsening at T6.    - Multimodal pain management including Lyrica, baclofen, lidocaine.   -  No neurosurgical intervention at this time     Migraines: Continue Topamax     Depression with anxiety:   - Guarded, patient continues on BuSpar, Atarax  - BuSpar increased today     EtOH abuse with chronic pancreatitis with hypomagnesemia:    - Continue supplementation  - Monitor for s/s of withdrawal     Livedo reticularis     Current everyday smoker: Cessation encouraged, nicotine patch as needed. Nicorette gum     GI/DVT prophylaxis: Protonix, Lovenox     Dispo: Home once full work-up is complete. Patient needs outpatient DEXA scan and follow-up outpatient for consideration of kyphoplasty with neurosurgery.  Follow-up with Dr. Cyrus Boo in 4 to 6 weeks    Significant Diagnostic Studies:   Labs / Micro:  CBC:   Lab Results   Component Value Date    WBC 17.2 08/03/2021    RBC 3.11 08/03/2021    RBC 4.16 04/30/2012    HGB 9.0 08/03/2021    HCT 28.1 08/03/2021    MCV 90.4 08/03/2021    MCH 28.9 08/03/2021    MCHC 32.0 08/03/2021    RDW 13.1 08/03/2021     08/03/2021     04/30/2012     BMP: Lab Results   Component Value Date    GLUCOSE 108 08/03/2021    GLUCOSE 92 04/30/2012     08/03/2021    K 3.5 08/03/2021     08/03/2021    CO2 24 08/03/2021    ANIONGAP 11 08/03/2021    BUN 3 08/03/2021    CREATININE 0.48 08/03/2021    BUNCRER NOT REPORTED 08/03/2021    CALCIUM 8.8 08/03/2021    LABGLOM >60 08/03/2021    GFRAA >60 08/03/2021    GFR      08/03/2021    GFR NOT REPORTED 08/03/2021     HFP:    Lab Results   Component Value Date    PROT 5.5 08/01/2021     CMP:    Lab Results   Component Value Date    GLUCOSE 108 08/03/2021    GLUCOSE 92 04/30/2012     08/03/2021    K 3.5 08/03/2021     08/03/2021    CO2 24 08/03/2021    BUN 3 08/03/2021    CREATININE 0.48 08/03/2021    ANIONGAP 11 08/03/2021    ALKPHOS 358 08/01/2021    ALT 15 08/01/2021    AST 20 08/01/2021    BILITOT 0.35 08/01/2021    LABALBU 2.3 08/01/2021    ALBUMIN 0.7 08/01/2021    LABGLOM >60 08/03/2021    GFRAA >60 08/03/2021    GFR      08/03/2021    GFR NOT REPORTED 08/03/2021    PROT 5.5 08/01/2021    CALCIUM 8.8 08/03/2021     PT/INR:    Lab Results   Component Value Date    PROTIME 11.9 07/28/2021    INR 1.1 07/28/2021     PTT:   Lab Results   Component Value Date    APTT 27.6 07/28/2021     FLP:    Lab Results   Component Value Date    HDL 42 12/23/2020     U/A:    Lab Results   Component Value Date    COLORU YELLOW 07/28/2021    TURBIDITY CLEAR 07/28/2021    SPECGRAV 1.032 07/28/2021    HGBUR NEGATIVE 07/28/2021    PHUR 8.0 07/28/2021    PROTEINU NEGATIVE 07/28/2021    GLUCOSEU NEGATIVE 07/28/2021    KETUA NEGATIVE 07/28/2021    BILIRUBINUR NEGATIVE 07/28/2021    BILIRUBINUR moderate 12/18/2018    UROBILINOGEN Normal 07/28/2021    NITRU NEGATIVE 07/28/2021    LEUKOCYTESUR NEGATIVE 07/28/2021     TSH:    Lab Results   Component Value Date    TSH 0.68 12/23/2020        Radiology:  XR CHEST (SINGLE VIEW FRONTAL)    Result Date: 7/29/2021  Pulmonary congestion with bibasilar effusions and adjacent infiltrates.      CT ABDOMEN PELVIS W IV CONTRAST Additional Contrast? Oral and Rectal    Result Date: 7/30/2021  Increasing multiloculated rim enhancing fluid collections in the left upper quadrant. Slightly decreased size of the dominant pocket of fluid which now contains a percutaneous drainage catheter, though there are innumerable new smaller collections and other previously present smaller collections which are increased in size. I suspect many of these do not communicate with the pocket of fluid containing the drainage catheter. Excellent opacification of the colon with enteric contrast without definitive enteric contrast in the left upper quadrant collection to confirm a colonic source. Pancreatic source is a possible alternative consideration in the appropriate clinical setting. Persistent colonic wall thickening in the region of the hepatic flexure on a background of probable ileus. Increased urinary bladder wall thickening with slightly increased perinephric stranding. Correlate with urinary labs for contributory urinary tract infection. CT ABDOMEN PELVIS W IV CONTRAST Additional Contrast? Radiologist Recommendation    Result Date: 7/27/2021  1. Large complex fluid collection in the left upper quadrant, concerning for abscess. This is likely related to the previously described colitis. 2. Consolidation and ground-glass opacity in both lower lobes with left pleural effusion, concerning for multifocal pneumonia. 3. Diffuse urinary bladder wall thickening. This could be due to underdistention, but correlation for any signs or symptoms of cystitis is recommended. 4. Chronic pancreatitis. 5. Nonobstructing left nephrolithiasis. XR CHEST PORTABLE    Result Date: 8/2/2021  No significant interval change in basilar opacities and pleural effusions. MRI ABDOMEN W WO CONTRAST MRCP    Result Date: 7/31/2021  1.  Suggestion of interstitial edematous pancreatitis in the pancreatic tail, consistent with acute on chronic pancreatitis given the CT appearance. 2. Approximately 13.8 cm x 6.5 cm x 2.3 cm heterogeneous collection in the left subphrenic space. Moderate fat necrosis could have this appearance, but the affected area is outside the pancreas with no findings of necrotizing pancreatitis. Additionally, the finding was absent less than 4 weeks prior. There is suspicion for continuity of the lesion with the main pancreatic duct in the tail, although this is incompletely evaluated due to only incomplete inclusion of the affected area on the 3D MRCP sequence. This suggest the possibility of fat necrosis from leaked pancreatic fluid. Abscess is an additional consideration. 3. Mild dilation of the upstream pancreatic duct with abrupt caliber change in the pancreatic neck potentially due to a filling defect such as calcification as seen on CT or a stricture. There are no findings suggestive of an obstructing malignancy. 4. Mild intrahepatic and extrahepatic biliary dilation of indeterminate etiology with no findings of choledocholithiasis. Consider ERCP if there are clinical findings of cholestasis. 5. Left para-aortic lymphadenopathy is likely reactive. Recommend attention on follow-up imaging. US THORACENTESIS Which side should the procedure be performed? Left    Result Date: 7/31/2021  Successful ultrasound guided left thoracentesis. CT ABSCESS DRAINAGE    Result Date: 7/28/2021  Successful percutaneous ultrasound and CT guided placement of 8 [place] left upper quadrant drainage catheter. New high attenuation material within the multi loculated left upper quadrant fluid collection suggests leakage from the adjacent abnormal colonic segment indicating micro perforation. The above findings were called by Dr. Bre Clements MD to Dr. Rhoda Hunt On 7/28/2021 at 12:32. IR GUIDED THORACENTESIS PLEURAL    Result Date: 7/28/2021  Successful ultrasound guided thoracentesis.        Consultations:    Consults:     Final Specialist Recommendations/Findings:   IP CONSULT TO INTERNAL MEDICINE  IP CONSULT TO GI  IP CONSULT TO NEUROSURGERY  IP CONSULT TO IV TEAM  IP CONSULT TO NEUROLOGY  IP CONSULT TO PULMONOLOGY  IP CONSULT TO IV TEAM  IP CONSULT TO INFECTIOUS DISEASES  IP CONSULT TO GENERAL SURGERY  IP CONSULT TO IV TEAM  IP CONSULT TO GI  IP CONSULT TO DIETITIAN  IP CONSULT TO DIETITIAN  IP CONSULT TO DIETITIAN  IP CONSULT TO HOME CARE NEEDS      The patient was seen and examined on day of discharge and this discharge summary is in conjunction with any daily progress note from day of discharge. Discharge plan:     Disposition: Home with homecare    Physician Follow Up:     [facility]  [address] [facility]  [address]  [address] [facility] [address] [phone]    Please call to make a follow up appt for OP colonoscopy in 6-8 weeks    Scherrie Nyhan, MD  [address] [place] [address]  [phone]    Call today  for follow up after hospitalization, ERCP EUS and FNA    Lduivina Metzger MD  [address] [place] [place] [address]  [phone]    In 1 week  for follow up after hospitalization       Requiring Further Evaluation/Follow Up POST HOSPITALIZATION/Incidental Findings:   - Follow-up with Dr. Jabari Avalos for follow-up after hospitalization and need for ERCP, EUS and possible FNA. -Follow-up with Dr. Ashish Velez in 4 to 6 weeks for follow-up after hospitalization for kyphoplasty consideration.   Complete x-ray imaging prior to appointment, included on this discharge summary    Diet: regular diet    Activity: As tolerated    Instructions to Patient: see attached     Discharge Medications:      Medication List      START taking these medications    carBAMazepine 200 MG tablet  Commonly known as: TEGRETOL  Take 1 tablet by mouth 3 times daily  Replaces: carBAMazepine 200 MG extended release tablet     nicotine 21 MG/24HR  Commonly known as: NICODERM CQ  Place 1 patch onto the skin daily  Start taking on: August 4, 2021     nicotine polacrilex 2 MG gum  Commonly known as: NICORETTE  Take 1 each by mouth every hour as needed for Smoking cessation     spironolactone 50 MG tablet  Commonly known as: ALDACTONE  Take 1 tablet by mouth daily  Start taking on: August 4, 2021        CHANGE how you take these medications    busPIRone 10 MG tablet  Commonly known as: BUSPAR  Take 2 tablets by mouth 3 times daily for 10 days  What changed:   medication strength  how much to take     hydrOXYzine 25 MG tablet  Commonly known as: ATARAX  Take 1 tablet by mouth 3 times daily as needed for Itching or Anxiety  What changed:   medication strength  See the new instructions.      topiramate 50 MG tablet  Commonly known as: TOPAMAX  Take 1 tablet by mouth 2 times daily  What changed:   medication strength  when to take this     traZODone 50 MG tablet  Commonly known as: DESYREL  Take 1 tablet by mouth nightly as needed for Sleep  What changed:   how much to take  how to take this  when to take this  reasons to take this  additional instructions        CONTINUE taking these medications    acamprosate 333 MG tablet  Commonly known as: CAMPRAL  Take 2 tablets by mouth 3 times daily     Acetaminophen Extra Strength 500 MG tablet  Generic drug: acetaminophen     albuterol sulfate  (90 Base) MCG/ACT inhaler  Commonly known as: Ventolin HFA  Inhale 2 puffs into the lungs 4 times daily as needed for Wheezing     amLODIPine 5 MG tablet  Commonly known as: NORVASC  TAKE ONE TABLET BY MOUTH DAILY     baclofen 10 MG tablet  Commonly known as: LIORESAL  Take 1 tablet by mouth 3 times daily     budesonide-formoterol 160-4.5 MCG/ACT Aero  Commonly known as: Symbicort  Inhale 2 puffs into the lungs 2 times daily     ferrous sulfate 325 (65 Fe) MG tablet  Commonly known as: IRON 325  Take 1 tablet by mouth 2 times daily     folic acid 1 MG tablet  Commonly known as: FOLVITE  Take 1 tablet by mouth daily     ibuprofen 600 MG tablet  Commonly known as: ADVIL;MOTRIN  Take 1 tablet by mouth 3 times daily as needed for Pain     Klor-Con M20 20 MEQ extended release tablet  Generic drug: potassium chloride  TAKE ONE TABLET BY MOUTH DAILY     pregabalin 200 MG capsule  Commonly known as: LYRICA  Take 1 capsule by mouth 3 times daily for 90 days. rOPINIRole 1 MG tablet  Commonly known as: REQUIP  Take 1 tablet by mouth nightly     sodium chloride 0.65 % nasal spray  Commonly known as: Altamist Spray  1 spray by Nasal route as needed for Congestion     tiotropium 2.5 MCG/ACT Aers inhaler  Commonly known as: Spiriva Respimat  Inhale 2 puffs into the lungs daily     vitamin B-1 100 MG tablet  Commonly known as: THIAMINE  Take 1 tablet by mouth daily     vitamin D 1.25 MG (35245 UT) Caps capsule  Commonly known as: ERGOCALCIFEROL  Take 1 capsule by mouth once a week        STOP taking these medications    carBAMazepine 200 MG extended release tablet  Commonly known as: TEGRETOL XR  Replaced by: carBAMazepine 200 MG tablet     FLUoxetine 20 MG tablet  Commonly known as: PROZAC     sertraline 50 MG tablet  Commonly known as: ZOLOFT     thermotabs Tabs tablet           Where to Get Your Medications      These medications were sent to [facility] [address] [place], [place] [id number] [address] [place] [address] [phone]  [address] [place] [address],[address]    Phone: [phone]   busPIRone 10 MG tablet  carBAMazepine 200 MG tablet  hydrOXYzine 25 MG tablet  nicotine 21 MG/24HR  nicotine polacrilex 2 MG gum  spironolactone 50 MG tablet  topiramate 50 MG tablet  traZODone 50 MG tablet         Discharge Procedure Orders   XR THORACIC SPINE (2 VIEWS)   Standing Status: Future Standing Exp. Date: 07/24/22   Scheduling Instructions: Standing ap and lateral     XR LUMBAR SPINE (2-3 VIEWS)   Standing Status: Future Standing Exp.  Date: 07/24/22   Scheduling Instructions: STANDING AP AND LATERAL; include both femoral heads     Order Specific Question Answer Comments   Reason for exam: Standing ap and lateral        Time Spent on discharge is  36 mins in patient examination, evaluation, counseling as well as medication reconciliation, prescriptions for required medications, discharge plan and follow up. Discussed with attending   Electronically signed by   LOI Rahman NP  8/3/2021  3:46 PM      Thank you Dr. Anya Pena MD for the opportunity to be involved in this patient's care.

## 2021-08-03 NOTE — PROGRESS NOTES
Crystal Linares requires a cane due to impaired ambulation and for increased stability in order to participate in or complete daily living tasks of: personal cares, ambulating, grooming and hygiene. The patient is able to safely use the cane and the functional mobility deficit can be sufficiently resolved.

## 2021-08-03 NOTE — PROGRESS NOTES
Home Oxygen Evaluation    Home Oxygen Evaluation completed. Patient is on room air liters   Resting SpO2 on room air = 100%    SpO2 on room air with exercise = 90%    Nocturnal Oximetry with patient on room air is recommended is SpO2 is between 89% and 95% (requires additional order).     Georgette Sam RCP  5:13 PM

## 2021-08-03 NOTE — DISCHARGE INSTR - COC
Continuity of Care Form    Patient Name: Travon Louise   :  1970  MRN:  [id number]    Admit date:  2021  Discharge date:  8-3-21    Code Status Order: Full Code   Advance Directives:      Admitting Physician:  No admitting provider for patient encounter. PCP: Cynthia Yee MD    Discharging Nurse: ronan  [address] Unit/Room#:   Discharging Unit Phone Number: [phone]    Emergency Contact:   Extended Emergency Contact Information  Primary Emergency Contact: Rebeca Morrow  Address: [address] [place] [address] Phone: [phone]  Work Phone: [phone]  Mobile Phone: [phone]  Relation: Parent  Secondary Emergency Contact: Vanesa Grider  Address: [facility] [address], [address] [place] [address]  Home Phone: [phone]  Work Phone: [phone]  Mobile Phone: [phone]  Relation: Brother/Sister   needed?  No    Past Surgical History:  Past Surgical History:   Procedure Laterality Date    BRAIN TUMOR EXCISION      astrocytoma times 2    COLONOSCOPY N/A 10/22/2020    COLONOSCOPY DIAGNOSTIC performed by aRdha Dickerson MD at [facility]  2021    CT ABSCESS DRAIN SUBCUTANEOUS 2021 STV CT SCAN    ENDOSCOPIC ULTRASOUND (LOWER) N/A 2020    ENDOSCOPIC ULTRASOUND, UPPER WITH LINEAR SCOPE FOR BIOPSY OF MASS ON HEAD OF PANCREAS performed by Veronica Callahan MD at [facility] 13 Left 7-3-13    ORIF tibial plateau    FRACTURE SURGERY Right     small finger metacarpal fracture    HAND SURGERY      pins    HYSTERECTOMY      UPPER GASTROINTESTINAL ENDOSCOPY N/A 10/22/2020    EGD BIOPSY performed by Radha Dickerson MD at [facility] 2021    EGD BIOPSY performed by Radha Dickerson MD at [facility]       Immunization History:   Immunization History   Administered Date(s) Administered    COVID-19, Mahin Pittman PF, 30mcg/0.3mL 03/26/2021, 04/16/2021    Influenza Vaccine, unspecified formulation 10/18/2018    Influenza Virus Vaccine 11/01/2017, 10/18/2018, 09/10/2019, 10/01/2020    Influenza, Selvin Arndt, IM, (6 mo and older Fluzone, Flulaval, Fluarix and 3 yrs and older Afluria) 09/10/2019    MMR 09/27/2006    Pneumococcal Polysaccharide ([id number]) 12/28/2020    Tdap (Boostrix, Adacel) 12/28/2020       Active Problems:  Patient Active Problem List   Diagnosis Code    Acute bronchitis J20.9    Reflex sympathetic dystrophy of lower limb G90.529    Essential hypertension I10    Nicotine addiction F17.200    Psychophysiological insomnia F51.04    Anxiety N10.7    Alcoholic peripheral neuropathy (HCC) G62.1    Hypokalemia E87.6    Macrocytic anemia D53.9    Hypomagnesemia E83.42    Tobacco use Z72.0    Ambulatory dysfunction R26.2    Seizure disorder ([address] G40.909    COPD with acute exacerbation (HCC) J44.1    Paresthesia of lower extremity R20.2    Acute respiratory failure with hypoxia (HCC) J96.01    Pancreatic cyst K86.2    Recurrent major depressive disorder (HCC) F33.9    Elevated CA 19-9 level R97.8    Perineal mass, female N90.89    Esophagitis candidal  B37.81    Condyloma A63.0    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24, the patient underwent 2 surgical resections without known recurrence C71.9    Alcohol use disorder, severe, in early remission ([address] D96.72    Metabolic encephalopathy P73.64    AMAN (generalized anxiety disorder) F41.1    Major depressive disorder, recurrent severe without psychotic features ([address] F33.2    Esophageal dysphagia R13.10    Chronic peripheral neuropathic pain M79.2, G89.29    History of alcohol abuse F10.11    History of petit-mal seizures Z86.69    Intractable episodic tension-type headache G44.211    Personal history of astrocytoma Z85.841    Multilevel spine pain M54.9    Chronic pain syndrome G89.4    Marijuana abuse F12.10    Severe sepsis (HCC) A41.9, R65.20    Closed fracture of fifth thoracic vertebra ([facility] S22.059A    Diverticulitis of large intestine with abscess without bleeding K57.20    Elevated brain natriuretic peptide (BNP) level R79.89    Elevated alkaline phosphatase level R74.8    Chronic pancreatitis (HCC) K86.1    Erythema ab igne L59.0    Compression fracture of thoracic vertebra (HCC) S22.000A    Compression of lumbar vertebra (HCC) Z26.400S    Metabolic acidosis with increased anion gap and accumulation of organic acids E87.2    Community acquired pneumonia of left lower lobe of lung J18.9    Septicemia (HCC) A41.9    Alcohol-induced acute pancreatitis K85.20    Fluid collection of pancreas K86.89    Diverticulitis of large intestine without perforation or abscess with bleeding K57.33    Pseudocyst of pancreas K86.3    Bandemia D72.825       Isolation/Infection:   Isolation            No Isolation          Patient Infection Status       Infection Onset Added Last Indicated Last Indicated By Review Planned Expiration Resolved Resolved By    None active    Resolved    COVID-19 Rule Out 21 COVID-19, Rapid (Ordered)   21 Rule-Out Test Resulted    COVID-19 Rule Out 21 COVID-19 (Ordered)   21 Rule-Out Test Resulted    COVID-19 Rule Out 21 COVID-19, Rapid (Ordered)   21 Rule-Out Test Resulted    COVID-19 21 COVID-19   21     COVID-19 Rule Out 21 COVID-19 (Ordered)   21 Rule-Out Test Resulted    COVID-19 Rule Out 20 Covid-19 Ambulatory (Ordered)   20 Rule-Out Test Resulted    COVID-19 Rule Out 20 Covid-19 Ambulatory (Ordered)   20 Rule-Out Test Resulted    C-diff Rule Out 10/20/20 10/20/20 10/20/20 C DIFF TOXIN/ANTIGEN (Ordered)   10/21/20 Lindy Lloyd RN    Order discontinued    COVID-19 Rule Out 10/12/20 10/12/20 10/12/20 COVID-19 (Ordered)   10/12/20 Rule-Out Test Resulted    C-diff Rule Out 03/14/20 03/14/20 03/14/20 Gastrointestinal Panel, Molecular (Ordered)   03/15/20 Rule-Out Test Resulted            Nurse Assessment:  Last Vital Signs: BP (!) 115/91   Pulse 96   Temp 98.2 °F (36.8 °C) (Oral)   Resp 25   Ht 5' 5\" (1.651 m)   Wt 137 lb 12.6 oz (62.5 kg)   SpO2 95%   BMI 22.93 kg/m²     Last documented pain score (0-10 scale): Pain Level: 7  Last Weight:   Wt Readings from Last 1 Encounters:   08/03/21 137 lb 12.6 oz (62.5 kg)     Mental Status:  oriented, alert, coherent, logical, thought processes intact and able to concentrate and follow conversation    IV Access:  - None    Nursing Mobility/ADLs:  Walking   Independent  Transfer  Independent  Bathing  Independent  Dressing  Independent  [address]  Assisted  Med Delivery   whole    Wound Care Documentation and Therapy:        Elimination:  Continence: Bowel: Yes  Bladder: No  Urinary Catheter: None   Colostomy/Ileostomy/Ileal Conduit: No       Date of Last BM: 8-3-21    Intake/Output Summary (Last 24 hours) at 8/3/2021 1547  Last data filed at 8/3/2021 1547  Gross per 24 hour   Intake 1900 ml   Output 1995 ml   Net -95 ml     I/O last 3 completed shifts: In: 200 [P.O.:700; I.V.:1200]  Out: 1945 [Urine:1500; Drains:145; MDSAE:924]    Safety Concerns:      At Risk for Falls and History of Seizures    Impairments/Disabilities:      Vision    Nutrition Therapy:  Current Nutrition Therapy:   - Oral Diet:  General    Routes of Feeding: Oral  Liquids: No Restrictions  Daily Fluid Restriction: no  Last Modified Barium Swallow with Video (Video Swallowing Test): not done    Treatments at the Time of Hospital Discharge:   Respiratory Treatments: see mar  Oxygen Therapy:  {Therapy; copd oxygen:18737}  Ventilator:    - No ventilator support    Rehab Therapies:   Weight Bearing Status/Restrictions: No weight bearing restirctions  Other Medical Equipment (for information only,

## 2021-08-03 NOTE — PROGRESS NOTES
[facility]  Office: [address], DO, Angle Dulce, DO, Aashish Dlegado, DO, Zuri Huang, DO, Ariel Rodriguez MD, Chidi Montgomery MD, Jack Gilmore MD, Calixto Saldaña MD, Matt Aguila MD, Migue Loera MD, Kyle Jane MD, Franklin Grullon, DO, Willow Chin MD, Rosy Cerna, DO, Pam Mixon MD,  Mariah Lynch, DO, Rudy Mednoza MD, Manuel Hernandez MD, Clinton Cabral MD, Troy Delgado MD, Daiana Shepherd MD, Homer Vallecillo MD, Reddy Galvan, [facility], [facility], CNP, Shahana Dinero, CNP, Desi Pinon, CNS, Inna Cain, CNP, Ousmane García, CNP, Sangeetha Naik, CNP, Tray Arriaga, CNP, Clinton Helton, CNP, NICOLLE RyanC, Sherry Hall, [facility], Laurel Mccartney, CNP, Mary Goyal, CNP, Kurt Raphael, CNP, Nannette Rosales, CNP, Lula Gillespie, CNP, Maria Del Carmen Bonilla, CNP, Radha Slater, [facility], Josef Del Valle, [address] [place] [address]    Progress Note    8/3/2021    9:02 AM    Name:   Claudia Ventura  MRN:     [id number]     Acct:      [de-identified]   Room:   2022/2022-01   Day:  15  Admit Date:  7/21/2021  3:06 PM    PCP:   Anya Pena MD  Code Status:  Full Code    Subjective:     C/C:   Chief Complaint   Patient presents with    Back Pain     states chronic back pain becoming worse.  has recently started physical therapy     Interval History Status: Improved    Patient seen and evaluated in room much improved from yesterday  Labs, vitals, notes reviewed  Vital signs stable overnight  Tolerating p.o. intake decently, calorie count underway with nutritionist guided support  Awaiting GI input on plan for possible EUS, FNA and ERCP  Diet advancing as tolerated    Brief History:      This is a 58-year-old female with underlying history of hypertension, COPD, anxiety/depression, UTI, thoracic compression fractures, peripheral neuropathy, migraine headaches, seizure disorder, and alcoholism (last drink 6 months ago) who presents the emergency department with generalized malaise, headache and abdominal/back pain.    Initial blood work revealed leukocytosis, elevated alkaline phosphatase, she was tachycardic and hypoxic as well, imaging revealed picture of acute diverticulitis/colitis of left colon without peripheral abscess along with chronic pancreatitis, CT spine revealed stable T7-T9 and T11 compression fracture, worsening of compression fracture of T6 and new T5 and superior L5 subacute fracture. Admitted to the hospital for sepsis related to diverticulitis. Started on zosyn. Neurosurgery consulted regarding compression fractures, no surgical intervention inpatient. Pt condition continued to worsen. Chest x-ray suggestive of pneumonia. Pulmonology consulted. Hi flow nasal cannula initiated in conjunction with steroids. ID consulted for abx recommendations.      7/27: pt continued to deteriorate clinically. Repeat CT of the abdomen revealed LUQ abscess, left pleural effusion. IR consulted for thoracentesis and intraabdominal abscess drainage. General surgery consulted for intraabdominal abscess.      7/28: IR guided drain placed for intraabdominal abscess. Thoracentesis completed, 400 mL fluid removed. Exudative based on Light's criteria. Review of Systems:     Constitutional:  negative for chills, fevers, sweats  Respiratory:  negative for cough, dyspnea on exertion, shortness of breath, wheezing  Cardiovascular:  negative for chest pain, chest pressure/discomfort, lower extremity edema, palpitations  Gastrointestinal:  + abdominal pain, constipation, diarrhea, nausea, vomiting  Neurological:  negative for dizziness, headache    Medications:      Allergies:  No Known Allergies    Current Meds:   Scheduled Meds:    busPIRone  20 mg Oral TID    tiotropium  2 puff Inhalation Daily    sodium chloride flush  5-40 mL Intravenous BID    pantoprazole  40 mg Oral QAM AC    guaiFENesin  600 mg Oral BID    nicotine  1 patch Transdermal Daily    lidocaine  1 patch Transdermal Daily    spironolactone  50 mg Oral Daily    polyethylene glycol  17 g Oral BID    carBAMazepine  200 mg Oral TID    topiramate  50 mg Oral BID    amLODIPine  5 mg Oral Daily    baclofen  10 mg Oral TID    budesonide-formoterol  2 puff Inhalation BID    ferrous sulfate  325 mg Oral BID    folic acid  1 mg Oral Daily    potassium chloride  20 mEq Oral Daily with breakfast    pregabalin  200 mg Oral TID    rOPINIRole  1 mg Oral Nightly    sodium chloride flush  5-40 mL Intravenous 2 times per day    enoxaparin  40 mg Subcutaneous Daily     Continuous Infusions:    lactated ringers 100 mL/hr at 08/02/21 0936    sodium chloride 25 mL (07/24/21 1803)     PRN Meds: nicotine polacrilex, magnesium sulfate, fentanNYL **OR** fentanNYL, potassium chloride **OR** potassium alternative oral replacement **OR** potassium chloride, LORazepam, hyoscyamine, hydrOXYzine, bisacodyl, albuterol, traZODone, sodium chloride flush, sodium chloride, acetaminophen **OR** acetaminophen, [Held by provider] oxyCODONE-acetaminophen **OR** [DISCONTINUED] oxyCODONE-acetaminophen, melatonin    Data:     Past Medical History:   has a past medical history of Acute respiratory failure with hypoxia ([address] Alcohol withdrawal syndrome, with delirium ([address] Alcoholism ([address] Anemia, 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 Social History:   reports that she has been smoking cigarettes.  She has a 15.00 pack-year smoking history. She has never used smokeless tobacco. She reports previous alcohol use. She reports current drug use. Frequency: 2.00 times per week. Drug: Marijuana. Family History:   Family History   Problem Relation Age of Onset    Other Father     Cancer Maternal Grandmother     Heart Disease Paternal Grandmother     Esophageal Cancer Maternal Aunt        Vitals:  BP (!) 129/91   Pulse 87   Temp 98.4 °F (36.9 °C) (Oral)   Resp 18   Ht 5' 5\" (1.651 m)   Wt 137 lb 12.6 oz (62.5 kg)   SpO2 94%   BMI 22.93 kg/m²   Temp (24hrs), Av.7 °F (37.1 °C), Min:98.1 °F (36.7 °C), Max:99.7 °F (37.6 °C)    No results for input(s): POCGLU in the last 72 hours. I/O (24Hr):     Intake/Output Summary (Last 24 hours) at 8/3/2021 0902  Last data filed at 8/3/2021 0600  Gross per 24 hour   Intake 3040 ml   Output 2125 ml   Net 915 ml       Labs:  Hematology:  Recent Labs     21  0452   WBC 18.1* 15.1* 17.2*   RBC 3.03* 3.12* 3.11*   HGB 9.0* 9.1* 9.0*   HCT 27.8* 29.2* 28.1*   MCV 91.7 93.6 90.4   MCH 29.7 29.2 28.9   MCHC 32.4 31.2 32.0   RDW 13.1 13.0 13.1   * 505* 539*   MPV 9.9 9.8 9.9     Chemistry:  Recent Labs     21  0452    138 140   K 3.4* 3.7 3.5*    102 105   CO2 24 24 24   GLUCOSE 77 129* 108*   BUN 4* 3* 3*   CREATININE 0.36* 0.38* 0.48*   ANIONGAP 15 12 11   LABGLOM >60 >60 >60   GFRAA >60 >60 >60   CALCIUM 8.7 8.5* 8.8     Recent Labs     21  0638   PROT 5.5*   LABALBU 2.3*   AST 20   ALT 15   ALKPHOS 358*   BILITOT 0.35   BILIDIR 0.22     ABG:  Lab Results   Component Value Date    POCPH 7.467 2021    POCPCO2 44.7 2021    POCPO2 71.9 2021    POCHCO3 32.3 2021    NBEA NOT REPORTED 2021    PBEA 8 2021    AXQ4GTQ NOT REPORTED 2021    KRTO1CRW 95 2021    FIO2 INFORMATION NOT PROVIDED 2021     Lab Results   Component Value Date/Time    SPECIAL NOT REPORTED 07/30/2021 05:20 PM     Lab Results   Component Value Date/Time    CULTURE NO GROWTH 4 DAYS 07/30/2021 05:20 PM       Radiology:  XR CHEST (SINGLE VIEW FRONTAL)    Result Date: 7/29/2021  Pulmonary congestion with bibasilar effusions and adjacent infiltrates. XR CHEST (SINGLE VIEW FRONTAL)    Result Date: 7/27/2021  Interval improvement in still mild interstitial edema with bilateral asymmetric, left greater than right, pleural effusions     XR THORACIC SPINE (3 VIEWS)    Result Date: 7/23/2021  Lumbar spine: Superior endplate fracture L5 which appears acute to subacute. No subluxation. Other vertebra well maintained. Spine significant compression deformity T6 with there are endplate loss in height T8, T9 and T10. No subluxation. Mild levo scoliotic curvature. Osteopenia complicates assessment. Pedicles are intact. Low lung volumes complicate assessment. There is suggestion of pleural effusions. XR LUMBAR SPINE (2-3 VIEWS)    Result Date: 7/23/2021  Lumbar spine: Superior endplate fracture L5 which appears acute to subacute. No subluxation. Other vertebra well maintained. Spine significant compression deformity T6 with there are endplate loss in height T8, T9 and T10. No subluxation. Mild levo scoliotic curvature. Osteopenia complicates assessment. Pedicles are intact. Low lung volumes complicate assessment. There is suggestion of pleural effusions. XR ACUTE ABD SERIES CHEST 1 VW    Result Date: 7/24/2021  Bilateral airspace disease and pleural effusions. Findings in the upper lobes appear increased compared to prior, left greater than right. Findings may be related to asymmetric edema with superimposed pneumonia not excluded. Gas and stool are seen throughout nondilated bowel loops with few nonspecific air-fluid levels in the right lower abdomen, likely in small bowel. Findings may be physiologic or related to mild ileus. No evidence of obstruction or free air.      CT HEAD WO CONTRAST    Result Date: 7/25/2021  No acute intracranial abnormality. Suboccipital craniectomy. MRI THORACIC SPINE WO CONTRAST    Result Date: 7/23/2021  1. Limited examination. 2. Compression deformities involving T6, T8, T9 and T11. These are likely chronic in nature. Diffusely decreased T1 and T2 marrow signal within the T6 vertebral body compatible with the sclerosis seen on the prior CT. 3. There is minimal bone marrow edema involving the left lateral elements of T6 and T7. Unclear whether this is degenerative in nature or perhaps sequelae of recent or remote trauma. 4. No significant spinal canal stenosis of the thoracic spine. 5. Moderate bilateral neural foraminal narrowing at T6-T7. 6. Bilateral pleural effusions, left greater than right. MRI LUMBAR SPINE WO CONTRAST    Result Date: 7/23/2021  1. Acute compression deformity of the superior endplate of L5 with approximately 35% loss of height. No significant bulging of the posterior cortex. 2. No significant spinal canal stenosis of the lumbar spine. 3. Neural foraminal narrowing at L3-L4 through L5-S1. 4. Minimal retrolisthesis at L5-S1. CT ABDOMEN PELVIS W IV CONTRAST Additional Contrast? Radiologist Recommendation    Result Date: 7/27/2021  1. Large complex fluid collection in the left upper quadrant, concerning for abscess. This is likely related to the previously described colitis. 2. Consolidation and ground-glass opacity in both lower lobes with left pleural effusion, concerning for multifocal pneumonia. 3. Diffuse urinary bladder wall thickening. This could be due to underdistention, but correlation for any signs or symptoms of cystitis is recommended. 4. Chronic pancreatitis. 5. Nonobstructing left nephrolithiasis. XR CHEST PORTABLE    Result Date: 7/25/2021  1. Increasing vascular congestion and left perihilar opacity. 2.  Pleural effusions and basilar opacities are otherwise without significant change.      CT ABSCESS DRAINAGE    Result Date: 7/28/2021  Successful percutaneous ultrasound and CT guided placement of 8 [place] left upper quadrant drainage catheter. New high attenuation material within the multi loculated left upper quadrant fluid collection suggests leakage from the adjacent abnormal colonic segment indicating micro perforation. The above findings were called by Dr. Rufina Encinas MD to Dr. Katherin White On 7/28/2021 at 12:32. IR GUIDED THORACENTESIS PLEURAL    Result Date: 7/28/2021  Successful ultrasound guided thoracentesis.        Physical Examination:        General appearance: Awake, resting in bed, in no acute distress  mental Status:  oriented to person, place and time   Lungs:  clear to auscultation bilaterally, normal effort, 3 L low flow nasal cannula  Heart:  regular rate and rhythm, no murmur  Abdomen:  soft, tender, nondistended, normal bowel sounds, no masses, hepatomegaly, splenomegaly, FRANSISCO drain with brownish bloody contents, drained 70 cc  Extremities:  no edema, redness, tenderness in the calves  Skin:  no gross lesions, rashes, induration    Assessment:        Hospital Problems         Last Modified POA    * (Principal) Severe sepsis ([address] 7/28/2021 Yes    Essential hypertension 7/21/2021 Yes    Tobacco use 7/21/2021 Yes    Seizure disorder ([address] 7/25/2021 Yes    COPD with acute exacerbation ([address] 7/22/2021 Yes    Acute respiratory failure with hypoxia ([address] 7/22/2021 Yes    Recurrent major depressive disorder ([address] 7/21/2021 Yes    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24, the patient underwent 2 surgical resections without known recurrence 3/89/8097 Yes    Metabolic encephalopathy 8/05/2185 Yes    AMAN (generalized anxiety disorder) 7/21/2021 Yes    Major depressive disorder, recurrent severe without psychotic features ([address] 7/31/2021 Yes    Chronic peripheral neuropathic pain 7/21/2021 Yes    History of alcohol abuse 7/21/2021 Yes    Personal history of astrocytoma 7/21/2021 Yes    Marijuana abuse 7/21/2021 Yes    Closed fracture of fifth thoracic vertebra ([address] 7/22/2021 Yes    Diverticulitis of large intestine with abscess without bleeding 7/28/2021 Yes    Elevated brain natriuretic peptide (BNP) level 7/22/2021 Yes    Elevated alkaline phosphatase level 7/22/2021 Yes    Chronic pancreatitis ([address] 7/22/2021 Yes    Erythema ab igne 7/22/2021 Yes    Compression fracture of thoracic vertebra ([address] 7/23/2021 Yes    Compression of lumbar vertebra ([address] 0/23/6596 Yes    Metabolic acidosis with increased anion gap and accumulation of organic acids 7/28/2021 Yes    Community acquired pneumonia of left lower lobe of lung 8/1/2021 Yes    Septicemia ([address] 7/29/2021 Yes    Alcohol-induced acute pancreatitis 7/31/2021 Yes    Fluid collection of pancreas 8/1/2021 Yes    Diverticulitis of large intestine without perforation or abscess with bleeding 8/1/2021 Yes    Pseudocyst of pancreas 8/1/2021 Yes    Bandemia 8/1/2021 Yes          Plan:        Acute on chronic pancreatitis:   -GI following. -MRCP showing phrenic space fluid collection suspicion for continuity of lesion with the main pancreatic duct in the tail upstream dilation and leakage of pancreatic fluid  -Plan for EUS to evaluate for stricture and rule out mass and possible FNA with ERCP and pancreatic duct stent placement to channel PD flow into small bowel  -With associated fluid collection in the left upper quadrant  - pancreatic fluid on thoracentesis  -Continue CLD, advance as tolerated-nutritionist consulted for strict calorie counting. TME:   - With lactic acidosis initially on presentation.    - Complicated by abdominal abscess and history of EtOH abuse. - resolved    Diverticulitis with abscess:   - With likely small microperforation.    - Continue Zosyn. - General surgery/infectious disease following.    - Abscess drained with IR and PERC drain placed. - Continues with bloody gelatinous drainage.     Sepsis:   -Improving  -Responding well to antibiotic therapy  - Continue LR. Essential hypertension: stable  - Norvasc, spironolactone    Exudative pleural effusion:   - Pulmonary following  -Continue to monitor for recurrance  -S/P thoracentesis x2   -Chest x-ray reviewed from 8/2 showing no interval recurrence    Seizure disorder:   - Without breakthrough seizure. - EEG completed showing no abnormal activity.    - On Tegretol, Ativan for breakthrough seizures. - Continue seizure precautions.    - Neurology initially following, signed off.    - Follow-up with Dr. Ignacio Ramos in 4 to 6 weeks    Back pain with peripheral neuropathy:   - With history of chronic L4-5 radiculopathy and osteoporosis. - No fracture at T5 and L5 with chronic compression fracture with worsening at T6.    - Multimodal pain management including Lyrica, baclofen, lidocaine.   -  No neurosurgical intervention at this time    Migraines: Continue Topamax    Depression with anxiety:   - Guarded, patient continues on BuSpar, Atarax  - BuSpar increased today    EtOH abuse with chronic pancreatitis with hypomagnesemia:    - Continue supplementation  - Monitor for s/s of withdrawal    Livedo reticularis    Current everyday smoker: Cessation encouraged, nicotine patch as needed. Nicorette gum    GI/DVT prophylaxis: Protonix, Lovenox    Dispo: Home once full work-up is complete. Patient needs outpatient DEXA scan and follow-up outpatient for consideration of kyphoplasty with neurosurgery.  Follow-up with Dr. Ignacio Ramos in 4 to 6 weeks    LOI Fenton NP  8/3/2021  9:02 AM

## 2021-08-03 NOTE — PROGRESS NOTES
PULMONARY & CRITICAL CARE MEDICINE PROGRESS NOTE     Patient:  Raul Duty  MRN: [id number]  Admit date: 2021  Primary Care Physician: Stephanie Blackman MD  Consulting Physician: Osei Vivas MD  CODE Status: Full Code  LOS: 13     SUBJECTIVE     CHIEF COMPLAINT/REASON FOR INITIAL CONSULT: Acute respiratory failuree respiratory failure  BRIEF HOSPITAL COURSE:   The patient is a 45 y.o. female admitted with abdominal and back pain. She was found to have thoracic spine fracture. Her CT abdomen done on  showed a complex fluid collection 9.3 x 7.3 x 5.4 cm in the left upper quadrant and left lower lobe consolidation/pleural effusion. INTERVAL HISTORY:  21  NO ACUTE COMP    Diet advanced  REVIEW OF SYSTEMS:  Review of Systems   Constitutional: Positive for fatigue. Negative for fever. HENT: Negative. Respiratory: Positive for cough and shortness of breath. Negative for wheezing. Cardiovascular: Negative. Gastrointestinal: Negative for abdominal pain, diarrhea and nausea. Neurological: Negative. OBJECTIVE     PaO2/FiO2 RATIO:  No results for input(s): POCPO2 in the last 72 hours.    FiO2 : (S) 50 %     VITAL SIGNS:   LAST:  BP (!) 115/91   Pulse 96   Temp 98.2 °F (36.8 °C) (Oral)   Resp 25   Ht 5' 5\" (1.651 m)   Wt 137 lb 12.6 oz (62.5 kg)   SpO2 95%   BMI 22.93 kg/m²   8-24 HR RANGE:  TEMP Temp  Av.6 °F (37 °C)  Min: 98.1 °F (36.7 °C)  Max: 99.7 °F (50.8 °C)   BP Systolic (85QQX), IBF:979 , Min:113 , ZFQ:874      Diastolic (25LFX), WAY:61, Min:80, Max:106     PULSE Pulse  Av.9  Min: 87  Max: 107   RR Resp  Av.3  Min: 15  Max: 25   O2 SAT SpO2  Av %  Min: 94 %  Max: 97 %   OXYGEN DELIVERY O2 Flow Rate (L/min)  Avg: 3 L/min  Min: 3 L/min  Max: 3 L/min        SYSTEMIC EXAMINATION:   General appearance - Ill-appearing, mild  respiratory distress  Mental status - awake & alert, follows commands  Eyes - pupils equal and reactive, sclera anicteric  Mouth - mucous membranes moist  Neck - supple, no significant adenopathy, carotids upstroke normal bilaterally, no bruits  Chest - Breath sounds bilaterally were dimnished to auscultation at bases  Heart - normal rate, regular rhythm, normal S1, S2, no murmurs, rubs, clicks or gallops  Abdomen - soft, left upper quadrant tenderness WITH DRAIN , no masses or organomegaly  Neurological - non-focal  Extremities - peripheral pulses normal, no pedal edema, no clubbing or cyanosis  Skin - normal coloration and turgor, no rashes, no suspicious skin lesions noted     DATA REVIEW     Medications: Current Inpatient  Scheduled Meds:   bethanechol  10 mg Oral Once    busPIRone  20 mg Oral TID    tiotropium  2 puff Inhalation Daily    sodium chloride flush  5-40 mL Intravenous BID    pantoprazole  40 mg Oral QAM AC    guaiFENesin  600 mg Oral BID    nicotine  1 patch Transdermal Daily    lidocaine  1 patch Transdermal Daily    spironolactone  50 mg Oral Daily    polyethylene glycol  17 g Oral BID    carBAMazepine  200 mg Oral TID    topiramate  50 mg Oral BID    amLODIPine  5 mg Oral Daily    baclofen  10 mg Oral TID    budesonide-formoterol  2 puff Inhalation BID    ferrous sulfate  325 mg Oral BID    folic acid  1 mg Oral Daily    potassium chloride  20 mEq Oral Daily with breakfast    pregabalin  200 mg Oral TID    rOPINIRole  1 mg Oral Nightly    sodium chloride flush  5-40 mL Intravenous 2 times per day    enoxaparin  40 mg Subcutaneous Daily     Continuous Infusions:   lactated ringers 100 mL/hr at 08/02/21 0936    sodium chloride 25 mL (07/24/21 1803)       INPUT/OUTPUT:  In: 1900 [P.O.:700;  I.V.:1200]  Out: 1945 [Urine:1500; Drains:145]  Date 08/03/21 0000 - 08/03/21 2359   Shift 2444-4842 9975-3105 1676-9867 24 Hour Total   INTAKE   Shift Total(mL/kg)       OUTPUT   Urine(mL/kg/hr) 200(0.4) 500  700   Drains(mL/kg) 50(0.8) 70(1.1)  120(1.9)   Stool(mL/kg) 300(4.8)   300(4.8)   Shift Total(mL/kg) 550(8.8) 570(9.1)  1120(17.9)   Weight (kg) 62.5 62.5 62.5 62.5        LABS:  ABGs:   No results for input(s): POCPH, POCPCO2, POCPO2, POCHCO3, OXWI2GPP in the last 72 hours. CBC:   Recent Labs     08/01/21  0638 08/02/21  0349 08/03/21  0452   WBC 18.1* 15.1* 17.2*   HGB 9.0* 9.1* 9.0*   HCT 27.8* 29.2* 28.1*   MCV 91.7 93.6 90.4   * 505* 539*   LYMPHOPCT 11* 14* 16*   RBC 3.03* 3.12* 3.11*   MCH 29.7 29.2 28.9   MCHC 32.4 31.2 32.0   RDW 13.1 13.0 13.1     CRP:   No results for input(s): CRP in the last 72 hours. LDH:   No results for input(s): LDH in the last 72 hours. BMP:   Recent Labs     08/01/21  0638 08/02/21  0349 08/03/21  0452    138 140   K 3.4* 3.7 3.5*    102 105   CO2 24 24 24   BUN 4* 3* 3*   CREATININE 0.36* 0.38* 0.48*   GLUCOSE 77 129* 108*     Liver Function Test:   Recent Labs     08/01/21  0638   PROT 5.5*   LABALBU 2.3*   ALT 15   AST 20   ALKPHOS 358*   BILITOT 0.35     Coagulation Profile:   No results for input(s): INR, PROTIME, APTT in the last 72 hours. D-Dimer:  No results for input(s): DDIMER in the last 72 hours. Lactic Acid:  No results for input(s): LACTA in the last 72 hours. Cardiac Enzymes:  No results for input(s): CKTOTAL, CKMB, CKMBINDEX, TROPONINI in the last 72 hours. Invalid input(s): TROPONIN, HSTROP  BNP/ProBNP:   No results for input(s): BNP, PROBNP in the last 72 hours. Triglycerides:  No results for input(s): TRIG in the last 72 hours. Microbiology:  Urine Culture:  No components found for: CURINE  Blood Culture:  No components found for: CBLOOD, CFUNGUSBL  Sputum Culture:  No components found for: CSPUTUM  No results for input(s): SPECDESC, SPECIAL, CULTURE, STATUS, ORG, CDIFFTOXPCR, CAMPYLOBPCR, SALMONELLAPC, SHIGAPCR, SHIGELLAPCR, MPNEUG, MPNEUM, LACTOQL in the last 72 hours. No results for input(s): SPUTUM, SPECDESC, SPECIAL, CULTURE, STATUS, ORG, CDIFFTOXPCR, MPNEUM, MPNEUG in the last 72 hours.     Invalid input(s): [place], which now   contains a percutaneous drainage catheter, though there are innumerable new   smaller collections and other previously present smaller collections which   are increased in size. I suspect many of these do not communicate with the   pocket of fluid containing the drainage catheter. Excellent opacification of the colon with enteric contrast without definitive   enteric contrast in the left upper quadrant collection to confirm a colonic   source. Pancreatic source is a possible alternative consideration in the   appropriate clinical setting. Persistent colonic wall thickening in the region of the hepatic flexure on a   background of probable ileus. Increased urinary bladder wall thickening with slightly increased perinephric   stranding. Correlate with urinary labs for contributory urinary tract   infection. XR CHEST (SINGLE VIEW FRONTAL)   Final Result   Pulmonary congestion with bibasilar effusions and adjacent infiltrates. IR GUIDED THORACENTESIS PLEURAL   Final Result   Successful ultrasound guided thoracentesis. CT ABSCESS DRAINAGE   Final Result   Successful percutaneous ultrasound and CT guided placement of 8 [place] left   upper quadrant drainage catheter. New high attenuation material within the multi loculated left upper quadrant   fluid collection suggests leakage from the adjacent abnormal colonic segment   indicating micro perforation. The above findings were called by Dr. Ammy Linares MD to Dr. Laurel Hendrickson On 7/28/2021 at 12:32. CT ABDOMEN PELVIS W IV CONTRAST Additional Contrast? Radiologist Recommendation   Final Result   1. Large complex fluid collection in the left upper quadrant, concerning for   abscess. This is likely related to the previously described colitis. 2. Consolidation and ground-glass opacity in both lower lobes with left   pleural effusion, concerning for multifocal pneumonia.    3. Diffuse urinary bladder wall thickening. This could be due to   underdistention, but correlation for any signs or symptoms of cystitis is   recommended. 4. Chronic pancreatitis. 5. Nonobstructing left nephrolithiasis. XR CHEST (SINGLE VIEW FRONTAL)   Final Result   Interval improvement in still mild interstitial edema with bilateral   asymmetric, left greater than right, pleural effusions         CT HEAD WO CONTRAST   Final Result   No acute intracranial abnormality. Suboccipital craniectomy. XR CHEST PORTABLE   Final Result   1. Increasing vascular congestion and left perihilar opacity. 2.  Pleural effusions and basilar opacities are otherwise without significant   change. XR ACUTE ABD SERIES CHEST 1 VW   Final Result   Bilateral airspace disease and pleural effusions. Findings in the upper   lobes appear increased compared to prior, left greater than right. Findings   may be related to asymmetric edema with superimposed pneumonia not excluded. Gas and stool are seen throughout nondilated bowel loops with few nonspecific   air-fluid levels in the right lower abdomen, likely in small bowel. Findings   may be physiologic or related to mild ileus. No evidence of obstruction or   free air. XR LUMBAR SPINE (2-3 VIEWS)   Final Result   Lumbar spine: Superior endplate fracture L5 which appears acute to subacute. No subluxation. Other vertebra well maintained. Spine significant compression deformity T6 with there are endplate loss in   height T8, T9 and T10. No subluxation. Mild levo scoliotic curvature. Osteopenia complicates assessment. Pedicles are intact. Low lung volumes   complicate assessment. There is suggestion of pleural effusions. XR THORACIC SPINE (3 VIEWS)   Final Result   Lumbar spine: Superior endplate fracture L5 which appears acute to subacute. No subluxation. Other vertebra well maintained.       Spine significant compression deformity T6 with there are endplate loss in   height T8, T9 and T10. No subluxation. Mild levo scoliotic curvature. Osteopenia complicates assessment. Pedicles are intact. Low lung volumes   complicate assessment. There is suggestion of pleural effusions. MRI THORACIC SPINE WO CONTRAST   Final Result   1. Limited examination. 2. Compression deformities involving T6, T8, T9 and T11. These are likely   chronic in nature. Diffusely decreased T1 and T2 marrow signal within the T6   vertebral body compatible with the sclerosis seen on the prior CT. 3. There is minimal bone marrow edema involving the left lateral elements of   T6 and T7. Unclear whether this is degenerative in nature or perhaps   sequelae of recent or remote trauma. 4. No significant spinal canal stenosis of the thoracic spine. 5. Moderate bilateral neural foraminal narrowing at T6-T7.   6. Bilateral pleural effusions, left greater than right. MRI LUMBAR SPINE WO CONTRAST   Final Result   1. Acute compression deformity of the superior endplate of L5 with   approximately 35% loss of height. No significant bulging of the posterior   cortex. 2. No significant spinal canal stenosis of the lumbar spine. 3. Neural foraminal narrowing at L3-L4 through L5-S1.   4. Minimal retrolisthesis at L5-S1. XR CHEST PORTABLE   Final Result   Interval development of bibasilar infiltrates and small pleural effusions   which could represent dependent edema, pneumonia or aspiration. XR ABDOMEN (KUB) (SINGLE AP VIEW)   Final Result   Mild dilation of the right hemicolon and patchy small bowel gas most likely   due to ileus. XR CHEST PORTABLE   Final Result   No radiologic evidence of acute cardiopulmonary disease. CT CHEST ABDOMEN PELVIS W CONTRAST   Final Result   Probable acute diverticulitis or colitis left colon. No evidence of   perforation or abscess at this time. Chronic pancreatitis.       Multiple compression fractures some of which are new since the previous exam.      Lingular ground-glass infiltrate. Recommend follow-up to resolution. Intra-atrial lipoma right atrium. Cardiac echo may be helpful. CT LUMBAR SPINE TRAUMA RECONSTRUCTION   Final Result   CT thoracic spine:      1. Decrease in height in T6 compared to prior study compatible with   worsening compression with subacute etiology and mild protrusion of the   dorsal aspect of T6. Narrowed T6-T7 neural foramina. 2.  Chronic superior height T8, T9, and T11 without interval change from   prior study. 3.  Mild prevertebral soft tissue prominence T6 without evidence of abscess   formation. CT lumbar spine:      1. No traumatic malalignment. 2.  Compression fracture superior L5 with subacute appearance. 3.  Calcifications in the abdomen incompletely included appearance suggesting   pancreatic calcifications. RECOMMENDATIONS:   Please reference CT chest, abdomen and pelvis of same day. CT THORACIC SPINE TRAUMA RECONSTRUCTION   Final Result   CT thoracic spine:      1. Decrease in height in T6 compared to prior study compatible with   worsening compression with subacute etiology and mild protrusion of the   dorsal aspect of T6. Narrowed T6-T7 neural foramina. 2.  Chronic superior height T8, T9, and T11 without interval change from   prior study. 3.  Mild prevertebral soft tissue prominence T6 without evidence of abscess   formation. CT lumbar spine:      1. No traumatic malalignment. 2.  Compression fracture superior L5 with subacute appearance. 3.  Calcifications in the abdomen incompletely included appearance suggesting   pancreatic calcifications. RECOMMENDATIONS:   Please reference CT chest, abdomen and pelvis of same day.          XR THORACIC SPINE (2 VIEWS)    (Results Pending)   XR LUMBAR SPINE (2-3 VIEWS)    (Results Pending)        Echocardiogram:   Results for orders placed during the hospital encounter of 07/21/21    ECHO Complete 2D W Doppler W Color    Summary  Left ventricle is normal in size. Global left ventricular systolic function  is difficult to assess due to heart rate but appears normal. Calculated  ejection fraction 70% by Guadarrama's method. Visually estimated EF 60-65%. Left atrium is normal in size. Lipomatous atrial septum. No shunt seen by color Doppler. Normal right ventricular size and function. No significant valvular regurgitation or stenosis seen. ASSESSMENT AND PLAN     Assessment:    // Acute hypoxic respiratory failure IMPROVED   // Severe sepsis IMPROVED   // Acute metabolic encephalopathy, improved  // Intra-abdominal abscess, s/p IR guided drainage 7/28, suspected pancreatic origin  // Left pleural effusion/compressive atelectasis, s/p thoracentesis, pleural fluid exudative, with neutrophilic predominance and elevated amylase levels  // COPD, severity to be determined  // Compression fracture of thoracic vertebrae  // Seizure disorder  // Essential hypertension  // Hyperlipidemia  // Chronic pancreatitis  // Alcohol abuse  // Tobacco abuse    Plan:    Continue Bronchodilators - Albuterol spiriva and LABA + ICS  EUS , ERCP per KODY Monsalve Caro, MD  Pulmonary and Critical Care Medicine           8/3/2021     Please note that this chart was generated using voice recognition Dragon dictation software. Although every effort was made to ensure the accuracy of this automated transcription, some errors in transcription may have occurred.

## 2021-08-03 NOTE — PROGRESS NOTES
[facility]   Re-consultNote    Steve Gramajo is a 45 y.o. female patient. Hospitalization Day:13      Chief consult reason:    Increasing abdominal pain  ? Diverticulitis/colitis, leukocytosis   re-consulted for pancreatic fluid collection    Subjective:  Patient seen and examined. Patient states she feels much better today. tolerated liquids. FRANSISCO drain with bloody sanguinous drainage. MRI/MRCP indicated interstitial edematous pancreatitis in the pancreatic tail consistent with acute on chronic pancreatitis  Additionally, 13.8 cm x 6.5 cm x 2.3 cm fluid collection in left subphrenic space with suspicion for continuity of the lesion with the main pancreatic duct in the tail, mild ductal dilatation with abrupt caliber changes in the pancreatic neck-see report for details the above findings are suspicious for a  Pancreatic duct stricture causing upstream dilation and leakage of pancreatic fluid. VITALS:  BP (!) 115/91   Pulse 96   Temp 98.2 °F (36.8 °C) (Oral)   Resp 25   Ht 5' 5\" (1.651 m)   Wt 137 lb 12.6 oz (62.5 kg)   SpO2 95%   BMI 22.93 kg/m²   TEMPERATURE:  Current - Temp: 98.2 °F (36.8 °C);  Max - Temp  Av.6 °F (37 °C)  Min: 98.1 °F (36.7 °C)  Max: 99.7 °F (37.6 °C)    Physical Assessment:  General appearance:  alert, cooperative and moderate pain distress  Mental Status:  oriented to person, place and time and normal affect  Lungs:  clear to auscultation bilaterally, normal effort  Heart:  regular rate and rhythm, no murmur  Abdomen:  soft, slightly tender, drain in LUQ draining serosanguinous output, nondistended, normal bowel sounds, no masses, hepatomegaly, splenomegaly  Extremities:  no edema, redness, tenderness in the calves  Skin: Rash posterior back    Data Review:    Labs and Imaging:     CBC:  Recent Labs     21  0638 21  0349 21  0452   WBC 18.1* 15.1* 17.2*   HGB 9.0* 9.1* 9.0*   MCV 91.7 93.6 90.4   RDW 13.1 13.0 13.1   * 505* 539*       ANEMIA STUDIES:  No results for input(s): LABIRON, TIBC, FERRITIN, XXMOYESJ42, FOLATE, OCCULTBLD in the last 72 hours. BMP:  Recent Labs     08/01/21  0638 08/02/21  0349 08/03/21  0452    138 140   K 3.4* 3.7 3.5*    102 105   CO2 24 24 24   BUN 4* 3* 3*   CREATININE 0.36* 0.38* 0.48*   GLUCOSE 77 129* 108*   CALCIUM 8.7 8.5* 8.8       LFTS:  Recent Labs     08/01/21  0638   ALKPHOS 358*   ALT 15   AST 20   BILITOT 0.35   BILIDIR 0.22   LABALBU 2.3*       Amylase/Lipase and Ammonia:  No results for input(s): AMYLASE, LIPASE, AMMONIA in the last 72 hours. Acute Hepatitis Panel:  Lab Results   Component Value Date    HEPBSAG NONREACTIVE 07/14/2019    HEPCAB NONREACTIVE 07/14/2019    HEPBIGM NONREACTIVE 07/14/2019    HEPAIGM NONREACTIVE 07/14/2019       HCV Genotype:  No results found for: HEPATITISCGENOTYPE    HCV Quantitative:  No results found for: HCVQNT    LIVER WORK UP:    AFP  Lab Results   Component Value Date    AFP 7.3 07/21/2021       Alpha 1 antitrypsin   No results found for: A1A    BRADLEY  Lab Results   Component Value Date    BRADLEY NEGATIVE 04/13/2021       AMA  No results found for: Bert Fix    ASMA  No results found for: SMOOTHMUSCAB    PT/INR  No results for input(s): PROTIME, INR in the last 72 hours. Cancer Markers:  CEA:  No results for input(s): CEA in the last 72 hours. Ca 125:  No results for input(s):  in the last 72 hours. Ca 19-9:   Invalid input(s):   AFP: No results for input(s): AFP in the last 72 hours. Lactic acid:Invalid input(s): LACTIC ACID    Radiology Review:      7/31/2021 MRI MRCP abdomen and pelvis  FINDINGS:   LOWER CHEST:  Trace to small right and small left pleural effusions with   associated partial to complete collapse of the bilateral lower lobes.    Suspected air bronchograms in the left lower lobe.  Mild cardiomegaly.  No   pericardial effusion.       LIVER:  Slightly increased signal on opposed-phase images and decreased signal on T2-weighted images, suggesting iron deposition.  No findings of   cirrhosis.       GALLBLADDER/BILE DUCTS:  Normal gallbladder.  Mild intrahepatic and   extrahepatic biliary dilation with normal tapering of distal common bile   duct.  No obvious ductal filling defects nor strictures.       PANCREAS:  Typical duct configuration.  Mild dilation of the upstream   pancreatic duct to the level of the neck with an apparent filling defect or   stricture at the area of caliber change.  Potential leak of fluid from the   upstream main duct in the tail.  Mild to moderate parenchymal atrophy. Suggestion of parenchymal edema in the tail.  No abnormal enhancement       SPLEEN:  Mildly atrophic.  Increased signal on opposed-phase images and   decreased signal on T2-weighted images, suggesting iron deposition.       ADRENAL GLANDS:  Normal.       KIDNEYS:  Normal.       GI/BOWEL:  Normal course and caliber of the stomach, small bowel, colon, and   rectum without obstruction.       PELVIS:  Surgical absence of the urinary bladder and likely of the ovaries   better assessed on CT.  Normal urinary bladder.       PERITONEUM/RETROPERITONEUM:  Unchanged nonenlarged to mildly enlarged left   para-aortic lymph nodes.  Trace simple free intraperitoneal fluid. Approximately 13.8 cm x 6.5 cm x 10.3 cm heterogeneous though predominantly   T1 hyperintense and T2 hypointense lesion in the left subphrenic space near   the stomach, spleen, and pancreatic tail with possible diffusion restriction   and peripheral enhancement.       VESSELS:  Typical arterial anatomy.  Mild to moderate systemic   atherosclerosis.  Normal variant circumaortic left renal vein.  Patent veins.       SOFT TISSUES/BONES:  Tiny fat containing umbilical hernia.  Signal   abnormalities related to suspected subacute compression deformities in the   thoracic and lumbar spine.  Multilevel degenerative disc disease.           Impression   1.  Suggestion of interstitial edematous pancreatitis in the pancreatic tail,   consistent with acute on chronic pancreatitis given the CT appearance. 2. Approximately 13.8 cm x 6.5 cm x 2.3 cm heterogeneous collection in the   left subphrenic space.  Moderate fat necrosis could have this appearance, but   the affected area is outside the pancreas with no findings of necrotizing   pancreatitis.  Additionally, the finding was absent less than 4 weeks prior. There is suspicion for continuity of the lesion with the main pancreatic duct   in the tail, although this is incompletely evaluated due to only incomplete   inclusion of the affected area on the 3D MRCP sequence.  This suggest the   possibility of fat necrosis from leaked pancreatic fluid.  Abscess is an   additional consideration. 3. Mild dilation of the upstream pancreatic duct with abrupt caliber change   in the pancreatic neck potentially due to a filling defect such as   calcification as seen on CT or a stricture.  There are no findings suggestive   of an obstructing malignancy. 4. Mild intrahepatic and extrahepatic biliary dilation of indeterminate   etiology with no findings of choledocholithiasis.  Consider ERCP if there are   clinical findings of cholestasis. 5. Left para-aortic lymphadenopathy is likely reactive.  Recommend attention   on follow-up imaging.        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24, the patient underwent 2 surgical resections without known recurrence    Metabolic encephalopathy    AMAN (generalized anxiety disorder)    Major depressive disorder, recurrent severe without psychotic features ([address]    Chronic peripheral neuropathic pain    History of alcohol abuse    Personal history of astrocytoma    Marijuana abuse    Closed fracture of fifth thoracic vertebra (HCC)    Diverticulitis of large intestine with abscess without bleeding    Elevated brain natriuretic peptide (BNP) level    Elevated alkaline phosphatase level    Chronic pancreatitis (HCC)    Erythema ab igne    Compression fracture of thoracic vertebra (HCC)    Compression of lumbar vertebra (HCC)    Metabolic acidosis with increased anion gap and accumulation of organic acids    Community acquired pneumonia of left lower lobe of lung    Septicemia ([address]    Alcohol-induced acute pancreatitis    Fluid collection of pancreas    Diverticulitis of large intestine without perforation or abscess with bleeding    Pseudocyst of pancreas    Bandemia  Resolved Problems:    * No resolved hospital problems. *       GI Impression:    Acute alcoholic pancreatitis   Chronic pancreatitis with PD stricture and PD fluid leak  Pancreatic fluid collection suspect due to leaked PD duct leak s/p IR drain placement   Acute on chronic abdominal pain secondary to #1  Leukocytosis improving most likely due to #1  Repeated pleural effusions-S/P thoracentesis x2  Alcohol misuse disorder-last drink 12/2021  Multiple lumbar compression fractures      Plan and Recommendations:    Patient will be scheduled with Dr Benjamin Grissom for EUS/ERCP next week. Patient will require an EUS to evaluate the pancreatic stricture and rule out mass and with possible FNA. Simultaneously an ERCP can be performed if feasible to attempt placement of pancreatic duct stent to channel PD flow into the small bowel in order to help heal the PD fluid leak.     OK to discharge and follow up with Dr Benjamin Grissom next week  Continue antibiotic management per ID  Continue supportive care, antiemetics, pain medication  Continue liquid diet and please supplement with high-protein clear Ensures  Recommend dietitian to follow for calorie count and recommendations      Thank you for allowing me to participate in the care of your patient. Please feel free to contact me with any questions or concerns. Bhavesh Roberto, [address] [facility]    This note was created with the assistance of a speech-recognition program.  Although the intention is to generate a document that actually reflects the content of the visit, no guarantees can be provided that every mistake has been identified and corrected by editing.

## 2021-08-03 NOTE — PROGRESS NOTES
[facility] -   Infectious diseases evaluation  admission date 7/21/2021    reason for consultation:   Diverticulosis/ leukocytosis and rash    Impression :   Current:  · bandemia  · Acute diverticulitis or L colitis  · Acute on chronic Pancreatitis of the tail w surrounding peripanc fluid collections and suspected pseudocysts  · Peripancreatic abd bloody fluid collection - drain placed 7/28 cx neg  · Lingular infiltrate  · Left large pleural effusion, reactive and cx neg 7/28   · Lactic acidosis  · 7/21/21 new Skin rash on the back - livedo reticularis   · CRP elevation  · Anxiety    Other:  · Copd w prednisone  · Hx astrocytoma age 24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 recurrent -  · SZ on tx  · Osteoporosis  · Migraine headache   · Chronic pancreatitis  Discussion / summary of stay / plan of care   ·   Recommendations   · zosyn -7/21 till 7/31 - stopped  · pancreatitis w many bella panc fluid collections and maybe pseudocysts  · GI - ERCP w panc stent and divert the pancreatic fluid, procedure deferred since the output in the FRANSISCO is much improved  · Discharge planning off antibiotics    Infection Control Recommendations   · Agness Precautions    Antimicrobial Stewardship Recommendations   · Simplification of therapy  · Targeted therapy    Coordination ofOutpatient Care:   · Estimated Length of IV antimicrobials:  · Patient will need Midline / picc Catheter Insertion:   · Patient will need SNF:  · Patient will need outpatient wound care:     History of Present Illness:   Initial history:  Kady Dutta is a 45y.o.-year-old female w abd  And back pain and hospital and found T spine cp fracture acute and sub acute, no sx plans,   Found w lingular infiltrate and started on zosyn, for presumed aspiration pneumonia. Also on CTAP left diverticulitis and no abscess or perforation seen, but surround left colitis seen as well.     Started on zosyn since 7/21 and eating and feels better,m still a little tired and sleepy but eating. leukocytosis still elevated while getting steroids for COPD. Rash on the back noticed since prior to admission, unclear cause-    ID called for the leukocytosis and rash. On off - unclear if present x long time PTA since she lives by herself and the rash is only on the back and post right arm. She had fevers at admission and is down to LGF since    On exam she is very anxious and ox 3 - easily emotional              Interval changes  8/3/2021   BP (!) 115/91   Pulse 96   Temp 98.2 °F (36.8 °C) (Oral)   Resp 25   Ht 5' 5\" (1.651 m)   Wt 137 lb 12.6 oz (62.5 kg)   SpO2 95%   BMI 22.93 kg/m²        No fever - feels better - FRANSISCO today w much more bloody thin fluid  abd soft and little sore    Not SOB  Labs reviewed  WBC 17    GI deferring ERCP for now due to clinical improvement    MRI abd 7/31 shows panc tail pancreatitis, acute on chronic, w surrounding fluid collections. CT AP 7/30. No intest perforation and abscesses are larger and increased in number. CT AP show 7/27 left abd collection, and left pleura moderate size effusion - GS note appreciated    IR drainage: 7/28/21  · Pleural: PH   7.5, prot 3.6, LDH 2125, amylase 4100, WBC <3000, RBC 2125. cx neg    · abd fluid collection drain placed and fluid is serosang, , RBC 27623, amylase 404, ,  cx neg        Summary of relevant labs:  Labs:  W 19 - 17 - 19 - 17 - 20 - 25 - 18 - 15 - 17  Creat  0.68   - 269 - 291  Procalcitonin0.16   Lactic Acid 8.7    Micro:   BC 7/27    Imaging:  CT AP 7/30/21  Increasing multiloculated rim enhancing fluid collections in the left upper   quadrant. Slightly decreased size of the dominant pocket of fluid which now   contains a percutaneous drainage catheter, though there are innumerable new   smaller collections and other previously present smaller collections which   are increased in size.   I suspect many of these do not communicate with the pocket of fluid containing the drainage catheter. Excellent opacification of the colon with enteric contrast without definitive   enteric contrast in the left upper quadrant collection to confirm a colonic   source. Pancreatic source is a possible alternative consideration in the   appropriate clinical setting. Persistent colonic wall thickening in the region of the hepatic flexure on a   background of probable ileus. Increased urinary bladder wall thickening with slightly increased perinephric   stranding. Correlate with urinary labs for contributory urinary tract   infection. CXR 7/27  Interval improvement in still mild interstitial edema with bilateral   asymmetric, left greater than right, pleural effusions     CT AP 7/27  Large complex fluid collection in the left upper quadrant, concerning for   abscess. This is likely related to the previously described colitis. 2. Consolidation and ground-glass opacity in both lower lobes with left   pleural effusion, concerning for multifocal pneumonia. 3. Diffuse urinary bladder wall thickening. This could be due to   underdistention, but correlation for any signs or symptoms of cystitis is   recommended. 4. Chronic pancreatitis. 5. Nonobstructing left nephrolithiasis. CT head 7/25  No acute intracranial abnormality. Suboccipital craniectomy. CT AP 7/21   Probable acute diverticulitis or colitis left colon. No evidence of perforation or abscess at this time. Chronic pancreatitis. Multiple compression fractures some of which are new since the previous exam.   Lingular ground-glass infiltrate. Recommend follow-up to resolution. Intra-atrial lipoma right atrium. Cardiac echo may be helpful. I have personally reviewed the past medical history, past surgical history, medications, social history, and family history, and I haveupdated the database accordingly. Allergies:   Patient has no known allergies.      Review of Systems:     Review of Systems   Constitutional: Negative for activity change, appetite change, chills, diaphoresis and fatigue. HENT: Negative for congestion and rhinorrhea. Eyes: Negative for photophobia, pain, discharge and visual disturbance. Respiratory: Negative for apnea, cough, choking and shortness of breath. Cardiovascular: Negative for chest pain and leg swelling. Gastrointestinal: Positive for abdominal pain. Negative for abdominal distention. Endocrine: Negative for heat intolerance and polyphagia. Genitourinary: Negative for dysuria, flank pain and frequency. Musculoskeletal: Negative for arthralgias. Skin: Positive for rash. Negative for color change. Allergic/Immunologic: Negative for immunocompromised state. Neurological: Negative for dizziness, tremors, seizures, speech difficulty and headaches. Hematological: Negative for adenopathy. Psychiatric/Behavioral: Negative for agitation. The patient is not nervous/anxious. Physical Examination :       Physical Exam  Constitutional:       Appearance: Normal appearance. She is not ill-appearing, toxic-appearing or diaphoretic. HENT:      Head: Normocephalic and atraumatic. Nose: Nose normal.      Mouth/Throat:      Mouth: Mucous membranes are moist.   Eyes:      General: No scleral icterus. Pupils: Pupils are equal, round, and reactive to light. Cardiovascular:      Rate and Rhythm: Normal rate and regular rhythm. Heart sounds: Normal heart sounds. No murmur heard. No friction rub. No gallop. Pulmonary:      Effort: No respiratory distress. Breath sounds: Normal breath sounds. No stridor. No rhonchi. Abdominal:      General: There is no distension. Palpations: Abdomen is soft. Tenderness: There is abdominal tenderness. Right CVA tenderness: left lower quadrant. Genitourinary:     Comments: No helms  Musculoskeletal:         General: No swelling, deformity or signs of injury. Cervical back: Neck supple. No rigidity or tenderness. Right lower leg: No edema. Left lower leg: No edema. Skin:     General: Skin is dry. Coloration: Skin is not jaundiced or pale. Findings: No bruising, erythema, lesion or rash. Neurological:      General: No focal deficit present. Mental Status: She is alert and oriented to person, place, and time. Cranial Nerves: No cranial nerve deficit. Sensory: No sensory deficit. Psychiatric:         Mood and Affect: Mood normal.         Thought Content:  Thought content normal.         Past Medical History:     Past Medical History:   Diagnosis Date    Acute respiratory failure with hypoxia ([address] 10/16/2020    Alcohol withdrawal syndrome, with delirium ([address] 12/14/2019    Alcoholism ([address]     Anemia 10/2020    GI bleed    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24, the patient underwent 2 surgical resections without known recurrence 10/23/2020    Closed fracture of lateral portion of left tibial plateau 40/96/6601    COPD (chronic obstructive pulmonary disease) ([address]     CO2 retainer, on Bipap at night for this, Dr. Odessa Govea ( last visit 11/20/2020 and note on chart )    Depression     bipolar, major depressive disorder, ptsd, anxiety    Dysphagia     GI bleed 10/2020    Hypertension     Memory loss     Oxygen dependent     pt stated not needed as of 12/9/2020    Pain, joint, ankle and foot     Pancreatic lesion 10/2020    Dr. Nazia Thompson working up pt    Peripheral neuropathy     Seizures ([address]     also baseline tremors-last sz summer 2020    Tension headache     Under care of team 07/01/2021    neuro-Dr Wan[facility]-last visit june 2021    Under care of team 07/01/2021    pain management-Hamzah jimenez-last visit june 2021    Under care of team 07/01/2021    pulmonology-Dr Due[facility]as-[address]-last virtual visit feb 2021    Under care of team 07/01/2021    psych-bahnfeldt NP-telemed-last visit may 2021  Under care of team 07/01/2021    ab-[address]-last visit june 2021    Wellness examination 07/01/2021    pcp-Ludiv[address]-last visit may 2021       Past Surgical  History:     Past Surgical History:   Procedure Laterality Date    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    COLONOSCOPY N/A 10/22/2020    COLONOSCOPY DIAGNOSTIC performed by Magui Parson MD at [facility]    CT ABSCESS DRAIN SUBCUTANEOUS  7/28/2021    CT ABSCESS DRAIN SUBCUTANEOUS 7/28/2021 [facility] CT SCAN    ENDOSCOPIC ULTRASOUND (LOWER) N/A 12/9/2020    ENDOSCOPIC ULTRASOUND, UPPER WITH LINEAR SCOPE FOR BIOPSY OF MASS ON HEAD OF PANCREAS performed by Colleen Parekh MD at [address] Left 7-3-13    ORIF tibial plateau    FRACTURE SURGERY Right     small finger metacarpal fracture    HAND SURGERY      pins    HYSTERECTOMY  2003    UPPER GASTROINTESTINAL ENDOSCOPY N/A 10/22/2020    EGD BIOPSY performed by Magui Parson MD at [facility] N/A 4/5/2021    EGD BIOPSY performed by Magui Parson MD at [facility]       Medications:      busPIRone  20 mg Oral TID    tiotropium  2 puff Inhalation Daily    sodium chloride flush  5-40 mL Intravenous BID    pantoprazole  40 mg Oral QAM AC    guaiFENesin  600 mg Oral BID    nicotine  1 patch Transdermal Daily    lidocaine  1 patch Transdermal Daily    spironolactone  50 mg Oral Daily    polyethylene glycol  17 g Oral BID    carBAMazepine  200 mg Oral TID    topiramate  50 mg Oral BID    amLODIPine  5 mg Oral Daily    baclofen  10 mg Oral TID    budesonide-formoterol  2 puff Inhalation BID    ferrous sulfate  325 mg Oral BID    folic acid  1 mg Oral Daily    potassium chloride  20 mEq Oral Daily with breakfast    pregabalin  200 mg Oral TID    rOPINIRole  1 mg Oral Nightly    sodium chloride flush  5-40 mL Intravenous 2 times per day    enoxaparin  40 mg Subcutaneous Daily       Social Maternal Aunt       Medical Decision Making:   I have independently reviewed/ordered the following labs:    CBC with Differential:   Recent Labs     08/02/21  0349 08/03/21  0452   WBC 15.1* 17.2*   HGB 9.1* 9.0*   HCT 29.2* 28.1*   * 539*   LYMPHOPCT 14* 16*   MONOPCT 8 8     BMP:  Recent Labs     08/02/21  0349 08/03/21  0452    140   K 3.7 3.5*    105   CO2 24 24   BUN 3* 3*   CREATININE 0.38* 0.48*     Hepatic Function Panel:   Recent Labs     08/01/21  0638   PROT 5.5*   LABALBU 2.3*   BILIDIR 0.22   IBILI 0.13   BILITOT 0.35   ALKPHOS 358*   ALT 15   AST 20     No results for input(s): RPR in the last 72 hours. No results for input(s): HIV in the last 72 hours. No results for input(s): BC in the last 72 hours. Lab Results   Component Value Date    CREATININE 0.48 08/03/2021    GLUCOSE 108 08/03/2021    GLUCOSE 92 04/30/2012       Detailed results: Thank you for allowing us to participate in the care of this patient. Please call with questions. This note is created with the assistance of a speech recognition program.  While intending to generate adocument that actually reflects the content of the visit, the document can still have some errors including those of syntax and sound a like substitutions which may escape proof reading. It such instances, actual meaningcan be extrapolated by contextual diversion.     Olamide Murphy MD  Office: [phone]  Perfect serve / office [phone]

## 2021-08-03 NOTE — CARE COORDINATION
Transitional Planning    Spoke with patient at bedside who states she does not want to go to [facility]. She is verbalizing that she wants to go home with [facility], [facility].. I asked what [facility] agency she would choose and she states she has used [facility] in the past. Referral sent. Called [place] of [facility] to ask that she cancels precert. 1408 Received call from [address] [place] [address] stating they can accept patient. Explained that I will update them when patient definitely discharges. 1700 [facility] for ISMA completion and home oxygen eval.     1720 Patient not qualifying for home oxygen. 1740 Patient provided a cane per her request.     Discharge Report    Called [address] and confirmed discharge. Confirmed with RN and patient. Patient has cane and has received eduction for FRANSISCO drain. She does not qualify for home oxygen. She has no other needs. Faxed ISMA and HC order to [phone].     [facility])  Clinical Case Management Department  Written by: Brooke Medeiros RN    Patient Name: Daphney Alcaraz  Attending Provider: Geronimo Garber MD  Admit Date: 2021  3:06 PM  MRN: [id number]  Account: [de-identified]                     : 1970  Discharge Date: 8/3/21      Disposition: home with [facility] with Javier Elliott RN

## 2021-08-03 NOTE — PROGRESS NOTES
BRONCHOSPASM/BRONCHOCONSTRICTION     [x]         IMPROVE AERATION/BREATH SOUNDS  [x]   ADMINISTER BRONCHODILATOR THERAPY AS APPROPRIATE  [x]   ASSESS BREATH SOUNDS  [x]   IMPLEMENT AEROSOL/MDI PROTOCOL  [x]   PATIENT EDUCATION AS NEEDED    PROVIDE ADEQUATE OXYGENATION WITH ACCEPTABLE SP02/ABG'S    [x]  IDENTIFY APPROPRIATE OXYGEN THERAPY  [x]   MONITOR SP02/ABG'S AS NEEDED   [x]   PATIENT EDUCATION AS NEEDED    JAGJIT ConnellPPatient Assessment complete. Sepsis ([address] [A41.9] . Vitals:    08/03/21 1127   BP: (!) 132/101   Pulse: 96   Resp: 23   Temp: 98.2 °F (36.8 °C)   SpO2: 97%   . Patients home meds are   Prior to Admission medications    Medication Sig Start Date End Date Taking? Authorizing Provider   acamprosate (CAMPRAL) 333 MG tablet Take 2 tablets by mouth 3 times daily 7/16/21 8/15/21  LOI Castillo NP   sertraline (ZOLOFT) 50 MG tablet Take 3 tablets by mouth daily for 7 days, THEN 2 tablets daily for 7 days, THEN 1 tablet daily for 7 days. 7/16/21 8/6/21  LOI Castillo NP   FLUoxetine (PROZAC) 20 MG tablet Take 0.5 tablets by mouth daily for 7 days, THEN 1 tablet daily for 23 days. 7/16/21 8/15/21  LOI Castillo NP   traZODone (DESYREL) 50 MG tablet TAKE 1 AND 1/2 TABLET BY MOUTH ONCE NIGHTLY AS NEEDED FOR SLEEP 7/16/21   LOI Castillo NP   thermotabs (MEDI-LYTE) TABS tablet Take 1 tablet by mouth daily for 3 days 7/9/21 7/12/21  LOI Hdez NP   carBAMazepine (TEGRETOL XR) 200 MG extended release tablet Take 1 tablet by mouth 2 times daily 7/6/21   River Rice MD   topiramate (TOPAMAX) 25 MG tablet Take 2 tablets by mouth daily 7/6/21   River Rice MD   pregabalin (LYRICA) 200 MG capsule Take 1 capsule by mouth 3 times daily for 90 days.  7/2/21 9/30/21  Melvena Gutter, MD   KLOR-CON M20 20 MEQ extended release tablet TAKE ONE TABLET BY MOUTH DAILY 6/24/21   Ludivina Hilton MD   amLODIPine (NORVASC) 5 MG tablet TAKE ONE TABLET BY MOUTH DAILY 6/3/21   Ludivina Obrien MD   busPIRone (BUSPAR) 15 MG tablet Take 1 tablet by mouth 3 times daily 5/5/21   Ludivina Obrien MD   baclofen (LIORESAL) 10 MG tablet Take 1 tablet by mouth 3 times daily 5/25/21   Ludivina Obrien MD   ibuprofen (ADVIL;MOTRIN) 600 MG tablet Take 1 tablet by mouth 3 times daily as needed for Pain 5/25/21   Ludivina Obrien MD   sertraline (ZOLOFT) 100 MG tablet Take 2 tablets by mouth daily 5/17/21   LOI Zuniga - NP   ferrous sulfate (IRON 325) 325 (65 Fe) MG tablet Take 1 tablet by mouth 2 times daily 4/22/21   Ludivina Obrien MD   rOPINIRole (REQUIP) 1 MG tablet Take 1 tablet by mouth nightly 4/1/21   Ludivina Obrien MD   budesonide-formoterol [facility]) 160-4.5 MCG/ACT AERO Inhale 2 puffs into the lungs 2 times daily 3/31/21   Ludivina Obrien MD   hydrOXYzine (ATARAX) 50 MG tablet TAKE ONE TABLET BY MOUTH THREE TIMES A DAY 3/12/21   Ludivina Obrien MD   tiotropium (SPIRIVA RESPIMAT) 2.5 MCG/ACT AERS inhaler Inhale 2 puffs into the lungs daily 2/26/21 7/8/21  Kell Alcantara MD   sodium chloride (ALTAMIST SPRAY) 0.65 % nasal spray 1 spray by Nasal route as needed for Congestion 12/28/20   Ludivina Obrien MD   ACETAMINOPHEN EXTRA STRENGTH 500 MG tablet Take 500 mg by mouth 3 times daily as needed 5/8/20   Historical Provider, MD   albuterol sulfate HFA (VENTOLIN HFA) 108 (90 Base) MCG/ACT inhaler Inhale 2 puffs into the lungs 4 times daily as needed for Wheezing 6/11/20   Ludivina Obrien MD   vitamin B-1 (THIAMINE) 100 MG tablet Take 1 tablet by mouth daily 7/12/19   Ludivina Obrien MD   vitamin D (ERGOCALCIFEROL) 90422 units CAPS capsule Take 1 capsule by mouth once a week 6/19/19   Ludivina Obrien MD   folic acid (FOLVITE) 1 MG tablet Take 1 tablet by mouth daily 6/19/19   Ludivina Obrien MD     Assessment   Takes PRN treatments at home      RR 17  Breath Sounds: CLEAR       Bronchodilator assessment at level  1      [x] Bronchodilator Assessment  BRONCHODILATOR ASSESSMENT SCORE  Score 0 1 2 3 4 5   Breath Sounds   []  Patient Baseline [x]  No Wheeze good aeration []  Faint, scattered wheezing, good aeration []  Expiratory Wheezing and or moderately diminished []  Insp/Exp wheeze and/or very diminished []  Insp/Exp and/ or marked distress   Respiratory Rate   []  Patient Baseline [x]  Less than 20 []  Less than 20 []  20-25 []  Greater than 25 []  Greater than 25   Peak flow % of Pred or PB [x]  NA   []  Greater than 90%  []  81-90% []  71-80% []  Less than or equal to 70%  or unable to perform []  Unable due to Respiratory Distress   Dyspnea re []  Patient Baseline [x]  No SOB []  No SOB []  SOB on exertion []  SOB min activity []  At rest/acute   e FEV% Predicted       [x]  NA []  Above 69%  []  Unable []  Above 60-69%  []  Unable []  Above 50-59%  []  Unable []  Above 35-49%  []  Unable []  Less than 35%  []  Unable

## 2021-08-03 NOTE — DISCHARGE INSTR - COC
Continuity of Care Form    Patient Name: Lizz Givens   :  1970  MRN:  [id number]    Admit date:  2021  Discharge date:  ***    Code Status Order: Full Code   Advance Directives:      Admitting Physician:  No admitting provider for patient encounter. PCP: Tiffanie Brown MD    Discharging Nurse: [facility] Unit/Room#:   Discharging Unit Phone Number: ***    Emergency Contact:   Extended Emergency Contact Information  Primary Emergency Contact: Rebeca Morrow  Address: [address] [place] [address] Lupe Myers of [address] Phone: [phone]  Work Phone: [phone]  Mobile Phone: [phone]  Relation: Parent  Secondary Emergency Contact: David Garcia  Address: [address]  [address] [facility] [address], [address] [place] [address]  Home Phone: [phone]  Work Phone: [phone]  Mobile Phone: [phone]  Relation: Brother/Sister   needed?  No    Past Surgical History:  Past Surgical History:   Procedure Laterality Date    BRAIN TUMOR EXCISION      astrocytoma times 2    COLONOSCOPY N/A 10/22/2020    COLONOSCOPY DIAGNOSTIC performed by Pelon Harris MD at [facility]  2021    CT ABSCESS DRAIN SUBCUTANEOUS 2021 V CT SCAN    ENDOSCOPIC ULTRASOUND (LOWER) N/A 2020    ENDOSCOPIC ULTRASOUND, UPPER WITH LINEAR SCOPE FOR BIOPSY OF MASS ON HEAD OF PANCREAS performed by Charles Curry MD at [facility] 13 Left 7-3-13    ORIF tibial plateau    FRACTURE SURGERY Right     small finger metacarpal fracture    HAND SURGERY      pins    HYSTERECTOMY      UPPER GASTROINTESTINAL ENDOSCOPY N/A 10/22/2020    EGD BIOPSY performed by Pelon Harris MD at [address] [place] [address] 2021    EGD BIOPSY performed by Pelon Harris MD at \[facility]\"" Endoscopy       Immunization History:   Immunization History   Administered Date(s) Administered    COVID-19, Pfizer, PF, 30mcg/0.3mL 03/26/2021, 04/16/2021    Influenza Vaccine, unspecified formulation 10/18/2018    Influenza Virus Vaccine 11/01/2017, 10/18/2018, 09/10/2019, 10/01/2020    Influenza, Rhoda Santos, IM, (6 mo and older Fluzone, Flulaval, Fluarix and 3 yrs and older Afluria) 09/10/2019    MMR 09/27/2006    Pneumococcal Polysaccharide (Thmneglol88) 12/28/2020    Tdap (Boostrix, Adacel) 12/28/2020       Active Problems:  Patient Active Problem List   Diagnosis Code    Acute bronchitis J20.9    Reflex sympathetic dystrophy of lower limb G90.529    Essential hypertension I10    Nicotine addiction F17.200    Psychophysiological insomnia F51.04    Anxiety R19.8    Alcoholic peripheral neuropathy (HCC) G62.1    Hypokalemia E87.6    Macrocytic anemia D53.9    Hypomagnesemia E83.42    Tobacco use Z72.0    Ambulatory dysfunction R26.2    Seizure disorder ([address] G40.909    COPD with acute exacerbation (HCC) J44.1    Paresthesia of lower extremity R20.2    Acute respiratory failure with hypoxia (HCC) J96.01    Pancreatic cyst K86.2    Recurrent major depressive disorder (HCC) F33.9    Elevated CA 19-9 level R97.8    Perineal mass, female N90.89    Esophagitis candidal  B37.81    Condyloma A63.0    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24, the patient underwent 2 surgical resections without known recurrence C71.9    Alcohol use disorder, severe, in early remission ([address] W96.07    Metabolic encephalopathy X46.88    AMAN (generalized anxiety disorder) F41.1    Major depressive disorder, recurrent severe without psychotic features ([address] F33.2    Esophageal dysphagia R13.10    Chronic peripheral neuropathic pain M79.2, G89.29    History of alcohol abuse F10.11    History of petit-mal seizures Z86.69    Intractable episodic tension-type headache G44.211    Personal history of astrocytoma Z85.841    Multilevel spine pain M54.9    Chronic pain syndrome G89.4    Marijuana abuse F12.10    Severe sepsis (HCC) A41.9, R65.20    Closed fracture of fifth thoracic vertebra ([facility] S22.059A    Diverticulitis of large intestine with abscess without bleeding K57.20    Elevated brain natriuretic peptide (BNP) level R79.89    Elevated alkaline phosphatase level R74.8    Chronic pancreatitis (HCC) K86.1    Erythema ab igne L59.0    Compression fracture of thoracic vertebra (HCC) S22.000A    Compression of lumbar vertebra (HCC) U03.878R    Metabolic acidosis with increased anion gap and accumulation of organic acids E87.2    Community acquired pneumonia of left lower lobe of lung J18.9    Septicemia (HCC) A41.9    Alcohol-induced acute pancreatitis K85.20    Fluid collection of pancreas K86.89    Diverticulitis of large intestine without perforation or abscess with bleeding K57.33    Pseudocyst of pancreas K86.3    Bandemia D72.825       Isolation/Infection:   Isolation            No Isolation          Patient Infection Status       Infection Onset Added Last Indicated Last Indicated By Review Planned Expiration Resolved Resolved By    None active    Resolved    COVID-19 Rule Out 21 COVID-19, Rapid (Ordered)   21 Rule-Out Test Resulted    COVID-19 Rule Out 21 COVID-19 (Ordered)   21 Rule-Out Test Resulted    COVID-19 Rule Out 21 COVID-19, Rapid (Ordered)   21 Rule-Out Test Resulted    COVID-19 21 COVID-19   21     COVID-19 Rule Out 21 COVID-19 (Ordered)   21 Rule-Out Test Resulted    COVID-19 Rule Out 20 Covid-19 Ambulatory (Ordered)   20 Rule-Out Test Resulted    COVID-19 Rule Out 20 Covid-19 Ambulatory (Ordered)   20 Rule-Out Test Resulted    C-diff Rule Out 10/20/20 10/20/20 10/20/20 C DIFF TOXIN/ANTIGEN (Ordered)   10/21/20 Jodi Schwartz RN    Order discontinued    COVID-19 Rule Out 10/12/20 10/12/20 10/12/20 COVID-19 (Ordered)   10/12/20 Rule-Out Test Resulted    C-diff Rule Out 03/14/20 03/14/20 03/14/20 Gastrointestinal Panel, Molecular (Ordered)   03/15/20 Rule-Out Test Resulted            Nurse Assessment:  Last Vital Signs: BP (!) 115/91   Pulse 96   Temp 98.2 °F (36.8 °C) (Oral)   Resp 25   Ht 5' 5\" (1.651 m)   Wt 137 lb 12.6 oz (62.5 kg)   SpO2 95%   BMI 22.93 kg/m²     Last documented pain score (0-10 scale): Pain Level: 7  Last Weight:   Wt Readings from Last 1 Encounters:   08/03/21 137 lb 12.6 oz (62.5 kg)     Mental Status:  {IP PT MENTAL STATUS:20030:::0}    IV Access:  { ISMA IV ACCESS:340017517:::0}    Nursing Mobility/ADLs:  Walking   {CHP DME ADLs:102769000:::0}  Transfer  {CHP DME ADLs:803199809:::0}  Bathing  {CHP DME ADLs:803444836:::0}  Dressing  {CHP DME ADLs:589766324:::0}  Toileting  {CHP DME ADLs:621489236:::0}  Feeding  {CHP DME ADLs:861717611:::0}  Med Admin  {CHP DME ADLs:300431399:::0}  Med Delivery   { ISMA MED Delivery:548658337:::0}    Wound Care Documentation and Therapy:        Elimination:  Continence: Bowel: {YES / KN:17559}  Bladder: {YES / VO:16099}  Urinary Catheter: {Urinary Catheter:733595015:::0}   Colostomy/Ileostomy/Ileal Conduit: {YES / KZ:49897}       Date of Last BM: ***    Intake/Output Summary (Last 24 hours) at 8/3/2021 1548  Last data filed at 8/3/2021 1547  Gross per 24 hour   Intake 1900 ml   Output 1995 ml   Net -95 ml     I/O last 3 completed shifts: In: 200 [P.O.:700;  I.V.:1200]  Out: 1945 [Urine:1500; Drains:145; Stool:300]    Safety Concerns:     [address] ISMA Safety Concerns:368381373:::0}    Impairments/Disabilities:      [address] ISMA Impairments/Disabilities:321030941:::0}    Nutrition Therapy:  Current Nutrition Therapy:   [address] ASHBY Diet List:919987264:::0}    Routes of Feeding: {CHP DME Other Feedings:997639307:::0}  Liquids: {Slp liquid thickness:44502}  Daily Fluid Restriction: {CHP DME Yes amt example:993631736:::0}  Last Modified Barium Swallow with Video (Video Swallowing Test): {Done Not Done WIYC:293647781:::4}    Treatments at the Time of Hospital Discharge:   Respiratory Treatments: ***  Oxygen Therapy:  {Therapy; copd oxygen:77035:::0}  Ventilator:    { CC Vent List:212045183:::0}    Rehab Therapies: {THERAPEUTIC INTERVENTION:6675681551}  Weight Bearing Status/Restrictions: { CC Weight Bearin:::0}  Other Medical Equipment (for information only, NOT a DME order):  {EQUIPMENT:458409516}  Other Treatments: ***    Patient's personal belongings (please select all that are sent with patient):  {CHP DME Belongings:440972683:::0}    RN SIGNATURE:  {Esignature:808641439:::0}    CASE MANAGEMENT/SOCIAL WORK SECTION    Inpatient Status Date: ***    Readmission Risk Assessment Score:  Readmission Risk              Risk of Unplanned Readmission:  31           Discharging to Facility/ Agency   Name:   Address:  Phone:  Fax:    Dialysis Facility (if applicable)   Name:  Address:  Dialysis Schedule:  Phone:  Fax:    / signature: {Esignature:904105461:::0}    PHYSICIAN SECTION    Prognosis: Fair    Condition at Discharge: Stable    Rehab Potential (if transferring to Rehab): Fair    Recommended Labs or Other Treatments After Discharge: follow up with GI    Physician Certification: I certify the above information and transfer of Dorie Hua  is necessary for the continuing treatment of the diagnosis listed and that she requires Home Care for greater 30 days.      Update Admission H&P: DISCHARGE SUMMARY TO FOLLOW    PHYSICIAN SIGNATURE:  Electronically signed by Fanny Wyatt MD on 8/3/21 at 3:49 PM EDT

## 2021-08-04 ENCOUNTER — TELEPHONE (OUTPATIENT)
Dept: GASTROENTEROLOGY | Age: 51
End: 2021-08-04

## 2021-08-04 DIAGNOSIS — K86.2 PANCREATIC CYST: Primary | ICD-10-CM

## 2021-08-04 DIAGNOSIS — K86.0 ALCOHOL-INDUCED CHRONIC PANCREATITIS (HCC): ICD-10-CM

## 2021-08-04 PROCEDURE — 1800000000 HC LEAVE OF ABSENCE

## 2021-08-04 NOTE — TELEPHONE ENCOUNTER
Writer spoke with patient and she is trying several small meals  Daily and we talked about no solid food after 7 PM but she can have liquids till midnight. She will do that and take her medication in the morning with a sip of water.

## 2021-08-04 NOTE — TELEPHONE ENCOUNTER
Dr Merline Sham called writer to let Nahomi Andrew know that patient needs an EUS to evaluate the pancreatic stricture and rule out mass and with possible FNA. Simultaneously an ERCP can be performed if feasible to attempt placement of pancreatic duct stent to channel PD flow into the small bowel in order to help heal the PD fluid leak. Writer contacted Dr Lois Brambila nurse to make sure it was OK for Dr Claudean Blalock to do this procedure since it is a Dr Lynn Robison patient. Writer got the OK that Dr Claudean Blalock can do the EUS.      Please Schedule patient next week per Dr Ashley Waters for a EUS with ERCP

## 2021-08-04 NOTE — TELEPHONE ENCOUNTER
Writer called and spoke to pt regarding scheduling EUS/ERCP for Wed 8/11/21 at [facility] with Dr Evette Red. Pt states she will arrange transportation that day. She is covid vaccinated. Informed pt that writer will call back tomorrow with a time for procedure. Pt voices understanding. Clinical staff, pt states she has gastroparesis and would like to know if she should be NPO longer than midnight prior to procedure. Please advise.

## 2021-08-05 ENCOUNTER — VIRTUAL VISIT (OUTPATIENT)
Dept: PSYCHIATRY | Age: 51
End: 2021-08-05
Payer: MEDICARE

## 2021-08-05 ENCOUNTER — PATIENT MESSAGE (OUTPATIENT)
Dept: GASTROENTEROLOGY | Age: 51
End: 2021-08-05

## 2021-08-05 DIAGNOSIS — F33.1 MODERATE EPISODE OF RECURRENT MAJOR DEPRESSIVE DISORDER (HCC): Primary | ICD-10-CM

## 2021-08-05 DIAGNOSIS — F41.1 GAD (GENERALIZED ANXIETY DISORDER): ICD-10-CM

## 2021-08-05 DIAGNOSIS — F10.21 ALCOHOL USE DISORDER, SEVERE, IN EARLY REMISSION (HCC): ICD-10-CM

## 2021-08-05 LAB
CULTURE: ABNORMAL
DIRECT EXAM: ABNORMAL
Lab: ABNORMAL
SPECIMEN DESCRIPTION: ABNORMAL

## 2021-08-05 PROCEDURE — 99214 OFFICE O/P EST MOD 30 MIN: CPT | Performed by: NURSE PRACTITIONER

## 2021-08-05 PROCEDURE — 1800000000 HC LEAVE OF ABSENCE

## 2021-08-05 RX ORDER — ESCITALOPRAM OXALATE 5 MG/1
5 TABLET ORAL DAILY
Qty: 30 TABLET | Refills: 1 | Status: SHIPPED | OUTPATIENT
Start: 2021-08-05 | End: 2021-10-06

## 2021-08-05 RX ORDER — BUSPIRONE HYDROCHLORIDE 30 MG/1
30 TABLET ORAL 2 TIMES DAILY WITH MEALS
Qty: 60 TABLET | Refills: 0 | Status: SHIPPED | OUTPATIENT
Start: 2021-08-05 | End: 2021-10-06

## 2021-08-05 RX ORDER — HYDROXYZINE HYDROCHLORIDE 25 MG/1
25 TABLET, FILM COATED ORAL 3 TIMES DAILY PRN
Qty: 90 TABLET | Refills: 0 | Status: SHIPPED | OUTPATIENT
Start: 2021-08-05 | End: 2021-08-19 | Stop reason: SDUPTHER

## 2021-08-05 RX ORDER — ACAMPROSATE CALCIUM 333 MG/1
666 TABLET, DELAYED RELEASE ORAL 3 TIMES DAILY
Qty: 180 TABLET | Refills: 0 | Status: SHIPPED | OUTPATIENT
Start: 2021-08-05 | End: 2021-10-07

## 2021-08-05 RX ORDER — TRAZODONE HYDROCHLORIDE 50 MG/1
50 TABLET ORAL NIGHTLY PRN
Qty: 30 TABLET | Refills: 0 | Status: SHIPPED | OUTPATIENT
Start: 2021-08-05 | End: 2021-11-11

## 2021-08-05 NOTE — PROGRESS NOTES
cooperative  Attention:Intact  Appetite: normal  Ambulation: unable to assess. Sleep disturbance: Yes  Loss of pleasure: Yes  Speech: spontaneous, normal rate, normal volume and well articulated  Mood: Depressed  Affect: depressed affect  Thought Content: intact, hopelessness and helplessness  Insight: Poor  Judgment: Intact  Memory: Intact long-term and Intact short-term  Suicide Assessment: no suicidal ideation  Homicide Assessment: denies current homicidal ideation, plan and intent    History:      Review of Systems:  Constitutional: Negative. HENT: Negative. Eyes: Negative. Respiratory: Negative. Cardiovascular: Negative. Gastrointestinal: Negative. Genitourinary: Negative . Musculoskeletal: Negative. Skin: Negative. Neurological: Negative. Endo/Heme/Allergies: Negative.       Current Outpatient Medications:     busPIRone (BUSPAR) 30 MG tablet, Take 30 mg by mouth 2 times daily (with meals), Disp: 60 tablet, Rfl: 0    hydrOXYzine (ATARAX) 25 MG tablet, Take 1 tablet by mouth 3 times daily as needed for Anxiety, Disp: 90 tablet, Rfl: 0    traZODone (DESYREL) 50 MG tablet, Take 1 tablet by mouth nightly as needed for Sleep, Disp: 30 tablet, Rfl: 0    escitalopram (LEXAPRO) 5 MG tablet, Take 1 tablet by mouth daily, Disp: 30 tablet, Rfl: 1    acamprosate (CAMPRAL) 333 MG tablet, Take 2 tablets by mouth 3 times daily, Disp: 180 tablet, Rfl: 0    carBAMazepine (TEGRETOL) 200 MG tablet, Take 1 tablet by mouth 3 times daily, Disp: 90 tablet, Rfl: 0    topiramate (TOPAMAX) 50 MG tablet, Take 1 tablet by mouth 2 times daily, Disp: 60 tablet, Rfl: 0    spironolactone (ALDACTONE) 50 MG tablet, Take 1 tablet by mouth daily, Disp: 30 tablet, Rfl: 0    nicotine (NICODERM CQ) 21 MG/24HR, Place 1 patch onto the skin daily, Disp: 30 patch, Rfl: 0    nicotine polacrilex (NICORETTE) 2 MG gum, Take 1 each by mouth every hour as needed for Smoking cessation, Disp: 110 each, Rfl: 0    pregabalin [facility]   Periodic Controlled Substance Monitoring  No signs of potential drug abuse or diversion identified. filed at 07/16/2021 1329        Urine Drug Screenings (1 yr)     Urine Drug Screen  Collected: 7/14/2019  1:03 PM (Final result)    Complete Results              Medication Contract and Consent for Opioid Use Documents Filed      No documents found                 [place] checked and there were no signs of substance abuse, or prescription misuse. Social History     Socioeconomic History    Marital status: Single     Spouse name: Not on file    Number of children: Not on file    Years of education: Not on file    Highest education level: Not on file   Occupational History    Not on file   Tobacco Use    Smoking status: Current Every Day Smoker     Packs/day: 0.50     Years: 30.00     Pack years: 15.00     Types: Cigarettes    Smokeless tobacco: Never Used    Tobacco comment: plans on quitting in the future    Vaping Use    Vaping Use: Never used   Substance and Sexual Activity    Alcohol use: Not Currently     Alcohol/week: 0.0 standard drinks    Drug use: Yes     Frequency: 2.0 times per week     Types: Marijuana    Sexual activity: Not on file   Other Topics Concern    Not on file   Social History Narrative    Not on file     Social Determinants of Health     Financial Resource Strain: Medium Risk    Difficulty of Paying Living Expenses: Somewhat hard   Food Insecurity: No Food Insecurity    Worried About Running Out of Food in the Last Year: Never true    Tyler of Food in the Last Year: Never true   Transportation Needs:     Lack of Transportation (Medical):      Lack of Transportation (Non-Medical):    Physical Activity:     Days of Exercise per Week:     Minutes of Exercise per Session:    Stress:     Feeling of Stress :    Social Connections:     Frequency of Communication with Friends and Family:     Frequency of Social Gatherings with Friends and Family:     Attends Worship Services:     Active Member of Clubs or Organizations:     Attends Club or Organization Meetings:     Marital Status:    Intimate Partner Violence:     Fear of Current or Ex-Partner:     Emotionally Abused:     Physically Abused:     Sexually Abused:        TOBACCO: Roni De La Paz  reports that she has been smoking cigarettes. She has a 15.00 pack-year smoking history. She has never used smokeless tobacco.  ETOH: Roni De La Paz  reports previous alcohol use.     Past Medical History:   Diagnosis Date    Acute respiratory failure with hypoxia ([address] 10/16/2020    Alcohol withdrawal syndrome, with delirium ([address] 12/14/2019    Alcoholism ([address]     Anemia 10/2020    GI bleed    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24, the patient underwent 2 surgical resections without known recurrence 10/23/2020    Closed fracture of lateral portion of left tibial plateau 84/19/9799    COPD (chronic obstructive pulmonary disease) ([address]     CO2 retainer, on Bipap at night for this, Dr. Annette Valdes ( last visit 11/20/2020 and note on chart )    Depression     bipolar, major depressive disorder, ptsd, anxiety    Dysphagia     GI bleed 10/2020    Hypertension     Memory loss     Oxygen dependent     pt stated not needed as of 12/9/2020    Pain, joint, ankle and foot     Pancreatic lesion 10/2020    Dr. Mark Casanova working up pt    Peripheral neuropathy     Seizures ([address]     also baseline tremors-last sz summer 2020    Tension headache     Under care of team 07/01/2021    neuro-Dr Wan-[facility]-last visit june 2021    Under care of team 07/01/2021    pain management-Hamzah jimenez-last visit june 2021    Under care of team 07/01/2021    pulmonology-Dr Dueñas-[facility]-last virtual visit feb 2021    Under care of team 07/01/2021    psych-bahnfeldt NP-telemed-last visit may 2021    Under care of team 07/01/2021    [address]-last visit june 2021    Well[facility]01/2021    [facility]      Metabolic monitoring is being done by PCP. Family History   Problem Relation Age of Onset    Other Father     Cancer Maternal Grandmother     Heart Disease Paternal Grandmother     Esophageal Cancer Maternal Aunt      Last Labs:   Lab Results   Component Value Date    LABA1C 5.5 04/13/2021     Lab Results   Component Value Date     04/13/2021      Lab Results   Component Value Date    WBC 17.2 (H) 08/03/2021    HGB 9.0 (L) 08/03/2021    HCT 28.1 (L) 08/03/2021    MCV 90.4 08/03/2021     (H) 08/03/2021    LYMPHOPCT 16 (L) 08/03/2021    RBC 3.11 (L) 08/03/2021    MCH 28.9 08/03/2021    MCHC 32.0 08/03/2021    RDW 13.1 08/03/2021          Lab Results   Component Value Date     08/03/2021    K 3.5 (L) 08/03/2021     08/03/2021    CO2 24 08/03/2021    BUN 3 (L) 08/03/2021    CREATININE 0.48 (L) 08/03/2021    GLUCOSE 108 (H) 08/03/2021    CALCIUM 8.8 08/03/2021    PROT 5.5 (L) 08/01/2021    LABALBU 2.3 (L) 08/01/2021    BILITOT 0.35 08/01/2021    ALKPHOS 358 (H) 08/01/2021    AST 20 08/01/2021    ALT 15 08/01/2021    LABGLOM >60 08/03/2021    GFRAA >60 08/03/2021    GLOB NOT REPORTED 08/01/2021      . last    Diagnosis:      1. Moderate episode of recurrent major depressive disorder ([facility]    2. AMAN (generalized anxiety disorder)    3. Alcohol use disorder, severe, in early remission ([facility]      Plan:    Discussed starting escitalopram, and changing the times she takes her buspirone. Discussed risks and benefits of medications, as well as need for yearly lab work. Follow up with [facility] for continued therapy. Continue work with PCP to manage medical concerns, and [facility] for continued follow-up. No orders of the defined types were placed in this encounter.      Orders Placed This Encounter   Medications    busPIRone (BUSPAR) 30 MG tablet     Sig: Take 30 mg by mouth 2 times daily (with meals)     Dispense:  60 tablet Refill:  0    hydrOXYzine (ATARAX) 25 MG tablet     Sig: Take 1 tablet by mouth 3 times daily as needed for Anxiety     Dispense:  90 tablet     Refill:  0    traZODone (DESYREL) 50 MG tablet     Sig: Take 1 tablet by mouth nightly as needed for Sleep     Dispense:  30 tablet     Refill:  0    escitalopram (LEXAPRO) 5 MG tablet     Sig: Take 1 tablet by mouth daily     Dispense:  30 tablet     Refill:  1    acamprosate (CAMPRAL) 333 MG tablet     Sig: Take 2 tablets by mouth 3 times daily     Dispense:  180 tablet     Refill:  0       Pt interventions:    Discussed importance of medication adherence, Discussed risks, benefits, side effects of medication and need for follow up treatment, Discussed benefits of referral for specialty care and Discussed self-care (sleep, nutrition, rewarding activities, social support, exercise)

## 2021-08-06 PROCEDURE — 1800000000 HC LEAVE OF ABSENCE

## 2021-08-07 PROCEDURE — 1800000000 HC LEAVE OF ABSENCE

## 2021-08-08 PROCEDURE — 1800000000 HC LEAVE OF ABSENCE

## 2021-08-09 ENCOUNTER — APPOINTMENT (OUTPATIENT)
Dept: CT IMAGING | Age: 51
DRG: 720 | End: 2021-08-09
Payer: MEDICARE

## 2021-08-09 ENCOUNTER — HOSPITAL ENCOUNTER (INPATIENT)
Age: 51
LOS: 3 days | Discharge: HOME | DRG: 720 | End: 2021-08-12
Attending: EMERGENCY MEDICINE
Payer: MEDICARE

## 2021-08-09 ENCOUNTER — APPOINTMENT (OUTPATIENT)
Dept: GENERAL RADIOLOGY | Age: 51
DRG: 720 | End: 2021-08-09
Payer: MEDICARE

## 2021-08-09 DIAGNOSIS — R10.9 ABDOMINAL PAIN, UNSPECIFIED ABDOMINAL LOCATION: Primary | ICD-10-CM

## 2021-08-09 PROBLEM — J98.11 ATELECTASIS OF LEFT LUNG: Status: ACTIVE | Noted: 2021-08-09

## 2021-08-09 PROBLEM — A41.9 SEPSIS (HCC): Status: ACTIVE | Noted: 2021-08-09

## 2021-08-09 PROBLEM — K85.90 ACUTE RECURRENT PANCREATITIS: Status: ACTIVE | Noted: 2021-08-09

## 2021-08-09 LAB
-: NORMAL
ABSOLUTE EOS #: 0.22 K/UL (ref 0–0.44)
ABSOLUTE IMMATURE GRANULOCYTE: 0.22 K/UL (ref 0–0.3)
ABSOLUTE LYMPH #: 1.57 K/UL (ref 1.1–3.7)
ABSOLUTE MONO #: 1.57 K/UL (ref 0.1–1.2)
ALBUMIN SERPL-MCNC: 3 G/DL (ref 3.5–5.2)
ALBUMIN/GLOBULIN RATIO: 0.8 (ref 1–2.5)
ALP BLD-CCNC: 250 U/L (ref 35–104)
ALT SERPL-CCNC: 10 U/L (ref 5–33)
AMORPHOUS: NORMAL
ANION GAP SERPL CALCULATED.3IONS-SCNC: 16 MMOL/L (ref 9–17)
AST SERPL-CCNC: 19 U/L
BACTERIA: NORMAL
BASOPHILS # BLD: 0 % (ref 0–2)
BASOPHILS ABSOLUTE: 0 K/UL (ref 0–0.2)
BILIRUB SERPL-MCNC: 0.22 MG/DL (ref 0.3–1.2)
BILIRUBIN URINE: NEGATIVE
BUN BLDV-MCNC: 10 MG/DL (ref 6–20)
BUN/CREAT BLD: ABNORMAL (ref 9–20)
CALCIUM SERPL-MCNC: 8.9 MG/DL (ref 8.6–10.4)
CASTS UA: NORMAL /LPF (ref 0–8)
CHLORIDE BLD-SCNC: 99 MMOL/L (ref 98–107)
CO2: 21 MMOL/L (ref 20–31)
COLOR: ABNORMAL
COMMENT UA: ABNORMAL
CREAT SERPL-MCNC: 0.76 MG/DL (ref 0.5–0.9)
CRYSTALS, UA: NORMAL /HPF
DIFFERENTIAL TYPE: ABNORMAL
EOSINOPHILS RELATIVE PERCENT: 1 % (ref 1–4)
EPITHELIAL CELLS UA: NORMAL /HPF (ref 0–5)
GFR AFRICAN AMERICAN: >60 ML/MIN
GFR NON-AFRICAN AMERICAN: >60 ML/MIN
GFR SERPL CREATININE-BSD FRML MDRD: ABNORMAL ML/MIN/{1.73_M2}
GFR SERPL CREATININE-BSD FRML MDRD: ABNORMAL ML/MIN/{1.73_M2}
GLUCOSE BLD-MCNC: 125 MG/DL (ref 70–99)
GLUCOSE URINE: NEGATIVE
HCG QUALITATIVE: NEGATIVE
HCT VFR BLD CALC: 33.6 % (ref 36.3–47.1)
HEMOGLOBIN: 10.5 G/DL (ref 11.9–15.1)
IMMATURE GRANULOCYTES: 1 %
INR BLD: 1
KETONES, URINE: NEGATIVE
LACTIC ACID, SEPSIS WHOLE BLOOD: 1.2 MMOL/L (ref 0.5–1.9)
LACTIC ACID, SEPSIS WHOLE BLOOD: 1.8 MMOL/L (ref 0.5–1.9)
LACTIC ACID, SEPSIS: NORMAL MMOL/L (ref 0.5–1.9)
LACTIC ACID, SEPSIS: NORMAL MMOL/L (ref 0.5–1.9)
LEUKOCYTE ESTERASE, URINE: ABNORMAL
LIPASE: 471 U/L (ref 13–60)
LYMPHOCYTES # BLD: 7 % (ref 24–43)
MCH RBC QN AUTO: 28.9 PG (ref 25.2–33.5)
MCHC RBC AUTO-ENTMCNC: 31.3 G/DL (ref 28.4–34.8)
MCV RBC AUTO: 92.6 FL (ref 82.6–102.9)
MONOCYTES # BLD: 7 % (ref 3–12)
MORPHOLOGY: NORMAL
MUCUS: NORMAL
NITRITE, URINE: NEGATIVE
NRBC AUTOMATED: 0 PER 100 WBC
OTHER OBSERVATIONS UA: NORMAL
PARTIAL THROMBOPLASTIN TIME: 24.6 SEC (ref 20.5–30.5)
PDW BLD-RTO: 14.4 % (ref 11.8–14.4)
PH UA: 6 (ref 5–8)
PLATELET # BLD: ABNORMAL K/UL (ref 138–453)
PLATELET ESTIMATE: ABNORMAL
PLATELET, FLUORESCENCE: 802 K/UL (ref 138–453)
PLATELET, IMMATURE FRACTION: 4.1 % (ref 1.1–10.3)
PMV BLD AUTO: ABNORMAL FL (ref 8.1–13.5)
POTASSIUM SERPL-SCNC: 4 MMOL/L (ref 3.7–5.3)
PROTEIN UA: ABNORMAL
PROTHROMBIN TIME: 10.9 SEC (ref 9.1–12.3)
RBC # BLD: 3.63 M/UL (ref 3.95–5.11)
RBC # BLD: ABNORMAL 10*6/UL
RBC UA: NORMAL /HPF (ref 0–4)
RENAL EPITHELIAL, UA: NORMAL /HPF
SEG NEUTROPHILS: 84 % (ref 36–65)
SEGMENTED NEUTROPHILS ABSOLUTE COUNT: 18.82 K/UL (ref 1.5–8.1)
SODIUM BLD-SCNC: 136 MMOL/L (ref 135–144)
SPECIFIC GRAVITY UA: 1.04 (ref 1–1.03)
TOTAL PROTEIN: 7 G/DL (ref 6.4–8.3)
TRICHOMONAS: NORMAL
TROPONIN INTERP: NORMAL
TROPONIN T: NORMAL NG/ML
TROPONIN, HIGH SENSITIVITY: 13 NG/L (ref 0–14)
TURBIDITY: CLEAR
URINE HGB: NEGATIVE
UROBILINOGEN, URINE: NORMAL
WBC # BLD: 22.4 K/UL (ref 3.5–11.3)
WBC # BLD: ABNORMAL 10*3/UL
WBC UA: NORMAL /HPF (ref 0–5)
YEAST: NORMAL

## 2021-08-09 PROCEDURE — 96365 THER/PROPH/DIAG IV INF INIT: CPT

## 2021-08-09 PROCEDURE — 99254 IP/OBS CNSLTJ NEW/EST MOD 60: CPT | Performed by: INTERNAL MEDICINE

## 2021-08-09 PROCEDURE — 83605 ASSAY OF LACTIC ACID: CPT

## 2021-08-09 PROCEDURE — 2580000003 HC RX 258: Performed by: STUDENT IN AN ORGANIZED HEALTH CARE EDUCATION/TRAINING PROGRAM

## 2021-08-09 PROCEDURE — 81001 URINALYSIS AUTO W/SCOPE: CPT

## 2021-08-09 PROCEDURE — 6370000000 HC RX 637 (ALT 250 FOR IP): Performed by: NURSE PRACTITIONER

## 2021-08-09 PROCEDURE — 6360000002 HC RX W HCPCS: Performed by: STUDENT IN AN ORGANIZED HEALTH CARE EDUCATION/TRAINING PROGRAM

## 2021-08-09 PROCEDURE — 85055 RETICULATED PLATELET ASSAY: CPT

## 2021-08-09 PROCEDURE — 6370000000 HC RX 637 (ALT 250 FOR IP): Performed by: STUDENT IN AN ORGANIZED HEALTH CARE EDUCATION/TRAINING PROGRAM

## 2021-08-09 PROCEDURE — 93005 ELECTROCARDIOGRAM TRACING: CPT | Performed by: STUDENT IN AN ORGANIZED HEALTH CARE EDUCATION/TRAINING PROGRAM

## 2021-08-09 PROCEDURE — 2060000000 HC ICU INTERMEDIATE R&B

## 2021-08-09 PROCEDURE — 83690 ASSAY OF LIPASE: CPT

## 2021-08-09 PROCEDURE — 71045 X-RAY EXAM CHEST 1 VIEW: CPT

## 2021-08-09 PROCEDURE — 84484 ASSAY OF TROPONIN QUANT: CPT

## 2021-08-09 PROCEDURE — 99223 1ST HOSP IP/OBS HIGH 75: CPT | Performed by: STUDENT IN AN ORGANIZED HEALTH CARE EDUCATION/TRAINING PROGRAM

## 2021-08-09 PROCEDURE — 85730 THROMBOPLASTIN TIME PARTIAL: CPT

## 2021-08-09 PROCEDURE — 80053 COMPREHEN METABOLIC PANEL: CPT

## 2021-08-09 PROCEDURE — 85025 COMPLETE CBC W/AUTO DIFF WBC: CPT

## 2021-08-09 PROCEDURE — 87040 BLOOD CULTURE FOR BACTERIA: CPT

## 2021-08-09 PROCEDURE — 74177 CT ABD & PELVIS W/CONTRAST: CPT

## 2021-08-09 PROCEDURE — 96375 TX/PRO/DX INJ NEW DRUG ADDON: CPT

## 2021-08-09 PROCEDURE — 84703 CHORIONIC GONADOTROPIN ASSAY: CPT

## 2021-08-09 PROCEDURE — 85610 PROTHROMBIN TIME: CPT

## 2021-08-09 PROCEDURE — 99212 OFFICE O/P EST SF 10 MIN: CPT

## 2021-08-09 PROCEDURE — 94640 AIRWAY INHALATION TREATMENT: CPT

## 2021-08-09 PROCEDURE — 6360000004 HC RX CONTRAST MEDICATION: Performed by: STUDENT IN AN ORGANIZED HEALTH CARE EDUCATION/TRAINING PROGRAM

## 2021-08-09 PROCEDURE — 99284 EMERGENCY DEPT VISIT MOD MDM: CPT

## 2021-08-09 RX ORDER — TOPIRAMATE 50 MG/1
50 TABLET, FILM COATED ORAL 2 TIMES DAILY
Status: DISCONTINUED | OUTPATIENT
Start: 2021-08-09 | End: 2021-08-12 | Stop reason: HOSPADM

## 2021-08-09 RX ORDER — VANCOMYCIN HYDROCHLORIDE 1 G/200ML
1000 INJECTION, SOLUTION INTRAVENOUS EVERY 12 HOURS
Status: DISCONTINUED | OUTPATIENT
Start: 2021-08-09 | End: 2021-08-09

## 2021-08-09 RX ORDER — FOLIC ACID 1 MG/1
1 TABLET ORAL DAILY
Status: DISCONTINUED | OUTPATIENT
Start: 2021-08-09 | End: 2021-08-12 | Stop reason: HOSPADM

## 2021-08-09 RX ORDER — SODIUM CHLORIDE, SODIUM LACTATE, POTASSIUM CHLORIDE, CALCIUM CHLORIDE 600; 310; 30; 20 MG/100ML; MG/100ML; MG/100ML; MG/100ML
1000 INJECTION, SOLUTION INTRAVENOUS ONCE
Status: COMPLETED | OUTPATIENT
Start: 2021-08-09 | End: 2021-08-09

## 2021-08-09 RX ORDER — ECHINACEA PURPUREA EXTRACT 125 MG
1 TABLET ORAL PRN
Status: DISCONTINUED | OUTPATIENT
Start: 2021-08-09 | End: 2021-08-12 | Stop reason: HOSPADM

## 2021-08-09 RX ORDER — ONDANSETRON 4 MG/1
4 TABLET, ORALLY DISINTEGRATING ORAL EVERY 8 HOURS PRN
Status: DISCONTINUED | OUTPATIENT
Start: 2021-08-09 | End: 2021-08-12 | Stop reason: HOSPADM

## 2021-08-09 RX ORDER — SODIUM CHLORIDE 9 MG/ML
25 INJECTION, SOLUTION INTRAVENOUS PRN
Status: DISCONTINUED | OUTPATIENT
Start: 2021-08-09 | End: 2021-08-12 | Stop reason: HOSPADM

## 2021-08-09 RX ORDER — ONDANSETRON 2 MG/ML
4 INJECTION INTRAMUSCULAR; INTRAVENOUS EVERY 6 HOURS PRN
Status: DISCONTINUED | OUTPATIENT
Start: 2021-08-09 | End: 2021-08-12 | Stop reason: HOSPADM

## 2021-08-09 RX ORDER — GAUZE BANDAGE 2" X 2"
100 BANDAGE TOPICAL DAILY
Status: DISCONTINUED | OUTPATIENT
Start: 2021-08-09 | End: 2021-08-12 | Stop reason: HOSPADM

## 2021-08-09 RX ORDER — POTASSIUM CHLORIDE 20 MEQ/1
40 TABLET, EXTENDED RELEASE ORAL PRN
Status: DISCONTINUED | OUTPATIENT
Start: 2021-08-09 | End: 2021-08-12 | Stop reason: HOSPADM

## 2021-08-09 RX ORDER — SODIUM CHLORIDE, SODIUM LACTATE, POTASSIUM CHLORIDE, CALCIUM CHLORIDE 600; 310; 30; 20 MG/100ML; MG/100ML; MG/100ML; MG/100ML
INJECTION, SOLUTION INTRAVENOUS CONTINUOUS
Status: DISCONTINUED | OUTPATIENT
Start: 2021-08-09 | End: 2021-08-12 | Stop reason: HOSPADM

## 2021-08-09 RX ORDER — PREGABALIN 100 MG/1
200 CAPSULE ORAL 3 TIMES DAILY
Status: DISCONTINUED | OUTPATIENT
Start: 2021-08-09 | End: 2021-08-12 | Stop reason: HOSPADM

## 2021-08-09 RX ORDER — FENTANYL CITRATE 50 UG/ML
50 INJECTION, SOLUTION INTRAMUSCULAR; INTRAVENOUS ONCE
Status: COMPLETED | OUTPATIENT
Start: 2021-08-09 | End: 2021-08-09

## 2021-08-09 RX ORDER — ACETAMINOPHEN 325 MG/1
650 TABLET ORAL EVERY 6 HOURS PRN
Status: DISCONTINUED | OUTPATIENT
Start: 2021-08-09 | End: 2021-08-12 | Stop reason: HOSPADM

## 2021-08-09 RX ORDER — POTASSIUM CHLORIDE 7.45 MG/ML
10 INJECTION INTRAVENOUS PRN
Status: DISCONTINUED | OUTPATIENT
Start: 2021-08-09 | End: 2021-08-12 | Stop reason: HOSPADM

## 2021-08-09 RX ORDER — BUSPIRONE HYDROCHLORIDE 15 MG/1
30 TABLET ORAL 2 TIMES DAILY WITH MEALS
Status: DISCONTINUED | OUTPATIENT
Start: 2021-08-09 | End: 2021-08-12 | Stop reason: HOSPADM

## 2021-08-09 RX ORDER — MORPHINE SULFATE 2 MG/ML
1 INJECTION, SOLUTION INTRAMUSCULAR; INTRAVENOUS EVERY 4 HOURS PRN
Status: DISCONTINUED | OUTPATIENT
Start: 2021-08-09 | End: 2021-08-12 | Stop reason: HOSPADM

## 2021-08-09 RX ORDER — PANTOPRAZOLE SODIUM 40 MG/1
40 TABLET, DELAYED RELEASE ORAL
Status: DISCONTINUED | OUTPATIENT
Start: 2021-08-10 | End: 2021-08-12 | Stop reason: HOSPADM

## 2021-08-09 RX ORDER — SODIUM CHLORIDE 0.9 % (FLUSH) 0.9 %
5-40 SYRINGE (ML) INJECTION EVERY 12 HOURS SCHEDULED
Status: DISCONTINUED | OUTPATIENT
Start: 2021-08-09 | End: 2021-08-12 | Stop reason: HOSPADM

## 2021-08-09 RX ORDER — ACETAMINOPHEN 650 MG/1
650 SUPPOSITORY RECTAL EVERY 6 HOURS PRN
Status: DISCONTINUED | OUTPATIENT
Start: 2021-08-09 | End: 2021-08-12 | Stop reason: HOSPADM

## 2021-08-09 RX ORDER — CARBAMAZEPINE 200 MG/1
200 TABLET ORAL 3 TIMES DAILY
Status: DISCONTINUED | OUTPATIENT
Start: 2021-08-09 | End: 2021-08-12 | Stop reason: HOSPADM

## 2021-08-09 RX ORDER — HYDROXYZINE HYDROCHLORIDE 25 MG/1
25 TABLET, FILM COATED ORAL 3 TIMES DAILY PRN
Status: DISCONTINUED | OUTPATIENT
Start: 2021-08-09 | End: 2021-08-12 | Stop reason: HOSPADM

## 2021-08-09 RX ORDER — BUDESONIDE AND FORMOTEROL FUMARATE DIHYDRATE 160; 4.5 UG/1; UG/1
2 AEROSOL RESPIRATORY (INHALATION) 2 TIMES DAILY
Status: DISCONTINUED | OUTPATIENT
Start: 2021-08-09 | End: 2021-08-12 | Stop reason: HOSPADM

## 2021-08-09 RX ORDER — NICOTINE 21 MG/24HR
1 PATCH, TRANSDERMAL 24 HOURS TRANSDERMAL DAILY
Status: DISCONTINUED | OUTPATIENT
Start: 2021-08-09 | End: 2021-08-12 | Stop reason: HOSPADM

## 2021-08-09 RX ORDER — MAGNESIUM SULFATE IN WATER 40 MG/ML
2000 INJECTION, SOLUTION INTRAVENOUS PRN
Status: DISCONTINUED | OUTPATIENT
Start: 2021-08-09 | End: 2021-08-12 | Stop reason: HOSPADM

## 2021-08-09 RX ORDER — SODIUM CHLORIDE 0.9 % (FLUSH) 0.9 %
5-40 SYRINGE (ML) INJECTION PRN
Status: DISCONTINUED | OUTPATIENT
Start: 2021-08-09 | End: 2021-08-12 | Stop reason: HOSPADM

## 2021-08-09 RX ORDER — TRAZODONE HYDROCHLORIDE 50 MG/1
50 TABLET ORAL NIGHTLY
Status: DISCONTINUED | OUTPATIENT
Start: 2021-08-09 | End: 2021-08-12 | Stop reason: HOSPADM

## 2021-08-09 RX ORDER — BACLOFEN 10 MG/1
10 TABLET ORAL 3 TIMES DAILY
Status: DISCONTINUED | OUTPATIENT
Start: 2021-08-09 | End: 2021-08-12 | Stop reason: HOSPADM

## 2021-08-09 RX ORDER — LANOLIN ALCOHOL/MO/W.PET/CERES
325 CREAM (GRAM) TOPICAL 2 TIMES DAILY
Status: DISCONTINUED | OUTPATIENT
Start: 2021-08-09 | End: 2021-08-12 | Stop reason: HOSPADM

## 2021-08-09 RX ORDER — AMLODIPINE BESYLATE 5 MG/1
5 TABLET ORAL DAILY
Status: DISCONTINUED | OUTPATIENT
Start: 2021-08-09 | End: 2021-08-12 | Stop reason: HOSPADM

## 2021-08-09 RX ORDER — ALBUTEROL SULFATE 90 UG/1
2 AEROSOL, METERED RESPIRATORY (INHALATION) 4 TIMES DAILY PRN
Status: DISCONTINUED | OUTPATIENT
Start: 2021-08-09 | End: 2021-08-12 | Stop reason: HOSPADM

## 2021-08-09 RX ADMIN — BACLOFEN 10 MG: 10 TABLET ORAL at 10:19

## 2021-08-09 RX ADMIN — BUSPIRONE HYDROCHLORIDE 30 MG: 15 TABLET ORAL at 17:12

## 2021-08-09 RX ADMIN — ACETAMINOPHEN 650 MG: 325 TABLET ORAL at 12:07

## 2021-08-09 RX ADMIN — BUDESONIDE AND FORMOTEROL FUMARATE DIHYDRATE 2 PUFF: 160; 4.5 AEROSOL RESPIRATORY (INHALATION) at 20:16

## 2021-08-09 RX ADMIN — AMLODIPINE BESYLATE 5 MG: 5 TABLET ORAL at 10:19

## 2021-08-09 RX ADMIN — FERROUS SULFATE TAB EC 325 MG (65 MG FE EQUIVALENT) 325 MG: 325 (65 FE) TABLET DELAYED RESPONSE at 10:18

## 2021-08-09 RX ADMIN — IOPAMIDOL 75 ML: 755 INJECTION, SOLUTION INTRAVENOUS at 06:36

## 2021-08-09 RX ADMIN — Medication 100 MG: at 10:17

## 2021-08-09 RX ADMIN — FOLIC ACID 1 MG: 1 TABLET ORAL at 10:18

## 2021-08-09 RX ADMIN — PIPERACILLIN AND TAZOBACTAM 3375 MG: 3; .375 INJECTION, POWDER, LYOPHILIZED, FOR SOLUTION INTRAVENOUS at 06:00

## 2021-08-09 RX ADMIN — SODIUM CHLORIDE, POTASSIUM CHLORIDE, SODIUM LACTATE AND CALCIUM CHLORIDE 1000 ML: 600; 310; 30; 20 INJECTION, SOLUTION INTRAVENOUS at 05:40

## 2021-08-09 RX ADMIN — BUSPIRONE HYDROCHLORIDE 30 MG: 15 TABLET ORAL at 10:18

## 2021-08-09 RX ADMIN — BACLOFEN 10 MG: 10 TABLET ORAL at 14:04

## 2021-08-09 RX ADMIN — FERROUS SULFATE TAB EC 325 MG (65 MG FE EQUIVALENT) 325 MG: 325 (65 FE) TABLET DELAYED RESPONSE at 21:21

## 2021-08-09 RX ADMIN — PREGABALIN 200 MG: 100 CAPSULE ORAL at 10:18

## 2021-08-09 RX ADMIN — VANCOMYCIN HYDROCHLORIDE 1000 MG: 1 INJECTION, SOLUTION INTRAVENOUS at 07:32

## 2021-08-09 RX ADMIN — CARBAMAZEPINE 200 MG: 200 TABLET ORAL at 10:18

## 2021-08-09 RX ADMIN — CARBAMAZEPINE 200 MG: 200 TABLET ORAL at 14:04

## 2021-08-09 RX ADMIN — PREGABALIN 200 MG: 100 CAPSULE ORAL at 21:20

## 2021-08-09 RX ADMIN — PIPERACILLIN AND TAZOBACTAM 3375 MG: 3; .375 INJECTION, POWDER, FOR SOLUTION INTRAVENOUS at 18:35

## 2021-08-09 RX ADMIN — ENOXAPARIN SODIUM 40 MG: 40 INJECTION SUBCUTANEOUS at 10:20

## 2021-08-09 RX ADMIN — FENTANYL CITRATE 50 MCG: 50 INJECTION, SOLUTION INTRAMUSCULAR; INTRAVENOUS at 05:40

## 2021-08-09 RX ADMIN — HYDROXYZINE HYDROCHLORIDE 25 MG: 25 TABLET ORAL at 21:27

## 2021-08-09 RX ADMIN — BACLOFEN 10 MG: 10 TABLET ORAL at 21:20

## 2021-08-09 RX ADMIN — ACETAMINOPHEN 650 MG: 325 TABLET ORAL at 22:46

## 2021-08-09 RX ADMIN — CARBAMAZEPINE 200 MG: 200 TABLET ORAL at 21:21

## 2021-08-09 RX ADMIN — TOPIRAMATE 50 MG: 50 TABLET, FILM COATED ORAL at 10:19

## 2021-08-09 RX ADMIN — SODIUM CHLORIDE, POTASSIUM CHLORIDE, SODIUM LACTATE AND CALCIUM CHLORIDE: 600; 310; 30; 20 INJECTION, SOLUTION INTRAVENOUS at 12:07

## 2021-08-09 RX ADMIN — PREGABALIN 200 MG: 100 CAPSULE ORAL at 14:04

## 2021-08-09 RX ADMIN — TOPIRAMATE 50 MG: 50 TABLET, FILM COATED ORAL at 21:20

## 2021-08-09 RX ADMIN — TRAZODONE HYDROCHLORIDE 50 MG: 50 TABLET ORAL at 22:41

## 2021-08-09 RX ADMIN — MORPHINE SULFATE 1 MG: 2 INJECTION, SOLUTION INTRAMUSCULAR; INTRAVENOUS at 17:13

## 2021-08-09 RX ADMIN — SODIUM CHLORIDE, PRESERVATIVE FREE 10 ML: 5 INJECTION INTRAVENOUS at 10:20

## 2021-08-09 ASSESSMENT — PAIN SCALES - GENERAL
PAINLEVEL_OUTOF10: 9
PAINLEVEL_OUTOF10: 7
PAINLEVEL_OUTOF10: 10
PAINLEVEL_OUTOF10: 10
PAINLEVEL_OUTOF10: 7
PAINLEVEL_OUTOF10: 7
PAINLEVEL_OUTOF10: 6
PAINLEVEL_OUTOF10: 6
PAINLEVEL_OUTOF10: 8

## 2021-08-09 ASSESSMENT — PAIN DESCRIPTION - PROGRESSION
CLINICAL_PROGRESSION: GRADUALLY WORSENING

## 2021-08-09 ASSESSMENT — PAIN DESCRIPTION - ONSET: ONSET: GRADUAL

## 2021-08-09 ASSESSMENT — PAIN DESCRIPTION - DIRECTION: RADIATING_TOWARDS: BACK

## 2021-08-09 ASSESSMENT — PAIN DESCRIPTION - PAIN TYPE
TYPE: ACUTE PAIN
TYPE: CHRONIC PAIN

## 2021-08-09 ASSESSMENT — PAIN DESCRIPTION - LOCATION
LOCATION: ABDOMEN
LOCATION: ABDOMEN

## 2021-08-09 ASSESSMENT — ENCOUNTER SYMPTOMS
APNEA: 0
EYE ITCHING: 0
ABDOMINAL DISTENTION: 0
COLOR CHANGE: 0
ABDOMINAL PAIN: 1

## 2021-08-09 ASSESSMENT — PAIN DESCRIPTION - ORIENTATION
ORIENTATION: LEFT;UPPER
ORIENTATION: LEFT;ANTERIOR;POSTERIOR

## 2021-08-09 ASSESSMENT — PAIN DESCRIPTION - DESCRIPTORS: DESCRIPTORS: SHARP

## 2021-08-09 ASSESSMENT — PAIN DESCRIPTION - FREQUENCY: FREQUENCY: INTERMITTENT

## 2021-08-09 NOTE — ED NOTES
The following labs were labeled with patient stickers & tubed to lab;    [x]Lavender   []On Ice  [x]Blue  [x]Green/ Yellow  [x]Green/ Black []On Ice  []Pink  []Red  [x]Yellow    []COVID-19 Swab []Rapid    []Urine Sample  []Pelvic Cultures    [x]Blood Cultures x2       Andrew Wells RN  08/09/21 5491

## 2021-08-09 NOTE — ED PROVIDER NOTES
[address] [facility]     Emergency Department     Faculty Attestation    I performed a history and physical examination of the patient and discussed management with the resident. I have reviewed and agree with the residents findings including all diagnostic interpretations, and treatment plans as written. Any areas of disagreement are noted on the chart. I was personally present for the key portions of any procedures. I have documented in the chart those procedures where I was not present during the key portions. I have reviewed the emergency nurses triage note. I agree with the chief complaint, past medical history, past surgical history, allergies, medications, social and family history as documented unless otherwise noted below. Documentation of the HPI, Physical Exam and Medical Decision Making performed by candeibandie is based on my personal performance of the HPI, PE and MDM. For Physician Assistant/ Nurse Practitioner cases/documentation I have personally evaluated this patient and have completed at least one if not all key elements of the E/M (history, physical exam, and MDM). Additional findings are as noted. 46 yo F FRANSISCO drain leakage, pt has percutaneous pancreatic drain, no fever, no vomit, + smoker,   pe hr 110, gcs 15, Dr [employer] escort for exam, abdomen mild diffuse tenderness without rebound, guarding or rigidity, luq lateral percutaneous drain, dressing very dirty, no calf swelling, no calf tenderness,     - + wbc, abx started, ct pending, care turned over to day shift,     EKG Interpretation    Interpreted by me  Normal sinus, heart rate 93, no ischemia, normal axis, QT corrected 437    CRITICAL CARE: There was a high probability of clinically significant/life threatening deterioration in this patient's condition which required my urgent intervention. Total critical care time was 10 minutes. This excludes any time for separately reportable procedures. [facility] 24, DO  08/09/21 [address], DO  08/09/21 [address], DO  08/09/21 [address]oh[address]ab[address]., DO  08/09/21 3308

## 2021-08-09 NOTE — PROGRESS NOTES
Kary Mark PPatient Assessment complete. Sepsis ([address] [A41.9]  Abdominal pain, unspecified abdominal location [R10.9] . Vitals:    08/09/21 1019   BP: 137/89   Pulse:    Resp:    Temp:    SpO2:    . Patients home meds are   Prior to Admission medications    Medication Sig Start Date End Date Taking? Authorizing Provider   busPIRone (BUSPAR) 30 MG tablet Take 30 mg by mouth 2 times daily (with meals) 8/5/21   LOI Milligan NP   hydrOXYzine (ATARAX) 25 MG tablet Take 1 tablet by mouth 3 times daily as needed for Anxiety 8/5/21   LOI Milligan NP   traZODone (DESYREL) 50 MG tablet Take 1 tablet by mouth nightly as needed for Sleep 8/5/21 9/4/21  LOI Milliagn NP   escitalopram (LEXAPRO) 5 MG tablet Take 1 tablet by mouth daily 8/5/21   LOI Milligan NP   acamprosate (CAMPRAL) 333 MG tablet Take 2 tablets by mouth 3 times daily 8/5/21 9/4/21  LOI Milligan NP   carBAMazepine (TEGRETOL) 200 MG tablet Take 1 tablet by mouth 3 times daily 8/3/21   LOI Rivero NP   topiramate (TOPAMAX) 50 MG tablet Take 1 tablet by mouth 2 times daily 8/3/21   LOI Rivero NP   spironolactone (ALDACTONE) 50 MG tablet Take 1 tablet by mouth daily 8/4/21   LOI Rivero NP   nicotine (NICODERM CQ) 21 MG/24HR Place 1 patch onto the skin daily 8/4/21   LOI Rivero NP   nicotine polacrilex (NICORETTE) 2 MG gum Take 1 each by mouth every hour as needed for Smoking cessation 8/3/21   LOI Rivero NP   pregabalin (LYRICA) 200 MG capsule Take 1 capsule by mouth 3 times daily for 90 days.  7/2/21 9/30/21  Rosina Rush MD   KLOR-CON M20 20 MEQ extended release tablet TAKE ONE TABLET BY MOUTH DAILY 6/24/21   Ludivina Bower MD   amLODIPine (NORVASC) 5 MG tablet TAKE ONE TABLET BY MOUTH DAILY 6/3/21   Ludivina Bower MD   baclofen (LIORESAL) 10 MG tablet Take 1 tablet by mouth 3 times daily 5/25/21   Ludivina Bower MD   ibuprofen (ADVIL;MOTRIN) 600 MG tablet Take 1 tablet by mouth 3 times daily as needed for Pain 5/25/21   Ludivina Barrientos MD   ferrous sulfate (IRON 325) 325 (65 Fe) MG tablet Take 1 tablet by mouth 2 times daily 4/22/21   Ludivina Barrientos MD   rOPINIRole (REQUIP) 1 MG tablet Take 1 tablet by mouth nightly 4/1/21   Ludivina Barrientos MD   budesonide-formoterol [facility]) 160-4.5 MCG/ACT AERO Inhale 2 puffs into the lungs 2 times daily 3/31/21   Ludivina Barrientos MD   tiotropium (SPIRIVA RESPIMAT) 2.5 MCG/ACT AERS inhaler Inhale 2 puffs into the lungs daily 2/26/21 7/8/21  Toni Summers MD   sodium chloride (ALTAMIST SPRAY) 0.65 % nasal spray 1 spray by Nasal route as needed for Congestion 12/28/20   Ludivina Barrientos MD   ACETAMINOPHEN EXTRA STRENGTH 500 MG tablet Take 500 mg by mouth 3 times daily as needed 5/8/20   Laisha Newby MD   albuterol sulfate HFA (VENTOLIN HFA) 108 (90 Base) MCG/ACT inhaler Inhale 2 puffs into the lungs 4 times daily as needed for Wheezing 6/11/20   Ludivina Barrientos MD   vitamin B-1 (THIAMINE) 100 MG tablet Take 1 tablet by mouth daily 7/12/19   Ludivina Barrientos MD   vitamin D (ERGOCALCIFEROL) 29259 units CAPS capsule Take 1 capsule by mouth once a week 6/19/19   Ludivina Barrientos MD   folic acid (FOLVITE) 1 MG tablet Take 1 tablet by mouth daily 6/19/19   Ludivina Barrientos MD     Assessment   Pt on room air SPO2 90%     RR 17  Breath Sounds: DIMINSIHED      Bronchodilator assessment at level  1      [x]    Bronchodilator Assessment  BRONCHODILATOR ASSESSMENT SCORE  Score 0 1 2 3 4 5   Breath Sounds   []  Patient Baseline [x]  No Wheeze good aeration []  Faint, scattered wheezing, good aeration []  Expiratory Wheezing and or moderately diminished []  Insp/Exp wheeze and/or very diminished []  Insp/Exp and/ or marked distress   Respiratory Rate   []  Patient Baseline [x]  Less than 20 []  Less than 20 []  20-25 []  Greater than 25 []  Greater than 25   Peak flow % of Pred or PB [x]  NA   []  Greater than 90%  []  81-90% []  71-80% []  Less than or equal to 70%  or unable to perform []  Unable due to Respiratory Distress   Dyspnea re []  Patient Baseline [x]  No SOB []  No SOB []  SOB on exertion []  SOB min activity []  At rest/acute   e FEV% Predicted       [x]  NA []  Above 69%  []  Unable []  Above 60-69%  []  Unable []  Above 50-59%  []  Unable []  Above 35-49%  []  Unable []  Less than 35%  []  Unable

## 2021-08-09 NOTE — CONSULTS
hrs:   BP Temp Temp src Pulse Resp SpO2 Height Weight   08/09/21 1019 137/89 -- -- -- -- -- -- --   08/09/21 0920 (!) 136/97 98.5 °F (36.9 °C) Oral 82 22 (!) 88 % 5' 5\" (1.651 m) 128 lb 8.5 oz (58.3 kg)   08/09/21 0601 (!) 120/92 -- -- 90 21 94 % -- --   08/09/21 0546 117/87 -- -- 93 (!) 37 93 % -- --   08/09/21 0531 (!) 121/108 -- -- 95 22 92 % -- --   08/09/21 0516 114/82 -- -- 97 (!) 47 91 % -- --   08/09/21 0440 113/66 99.3 °F (37.4 °C) Oral 110 22 92 % 5' 5\" (1.651 m) 137 lb (62.1 kg)       Summary of relevant labs:  Labs:  Wbc 24  Micro:  BC   Imaging:  CT AP 8/9  Approximately 50% decrease in size of the septated fluid collection in the   left upper quadrant, with residual components measuring up to 7 x 5 cm with   indwelling percutaneous drain. 2. New area adjacent edema within the mesentery at the splenic flexure of the   colon has developed without additional abscess or fluid collection. 3. Persistent inflammatory changes at the splenic flexure of the colon,   similar to the prior exam.  These could be reactive to the adjacent fluid   collection. 4. Left lower lobe atelectasis. Mucous plugging in the right lower lobe   segmental bronchi. 5. Subacute T12 and L5 compression fractures with 25% vertebral body height   loss. I have personally reviewed the past medical history, past surgical history, medications, social history, and family history, and I haveupdated the database accordingly. Allergies:   Patient has no known allergies. Review of Systems:     Review of Systems   Constitutional: Negative for activity change and appetite change. HENT: Negative for congestion. Eyes: Negative for itching. Respiratory: Negative for apnea. Cardiovascular: Negative for chest pain. Gastrointestinal: Positive for abdominal pain. Negative for abdominal distention. Endocrine: Negative for heat intolerance. Genitourinary: Negative for dysuria.    Musculoskeletal: Negative for arthralgias. Skin: Negative for color change. Allergic/Immunologic: Negative for immunocompromised state. Neurological: Negative for dizziness. Hematological: Negative for adenopathy. Psychiatric/Behavioral: Negative for agitation. Physical Examination :       Physical Exam  Constitutional:       Appearance: She is normal weight. HENT:      Head: Normocephalic and atraumatic. Nose: Nose normal.      Mouth/Throat:      Mouth: Mucous membranes are moist.   Eyes:      General: No scleral icterus. Conjunctiva/sclera: Conjunctivae normal.   Cardiovascular:      Rate and Rhythm: Regular rhythm. Heart sounds: Normal heart sounds. No murmur heard. Pulmonary:      Effort: No respiratory distress. Breath sounds: Normal breath sounds. Abdominal:      General: There is no distension. Tenderness: There is abdominal tenderness. Genitourinary:     Comments: No helms  Musculoskeletal:         General: No swelling or deformity. Cervical back: Neck supple. No rigidity. Skin:     General: Skin is dry. Coloration: Skin is not jaundiced. Neurological:      General: No focal deficit present. Mental Status: She is alert and oriented to person, place, and time. Psychiatric:         Mood and Affect: Mood normal.         Thought Content:  Thought content normal.         Past Medical History:     Past Medical History:   Diagnosis Date    Acute respiratory failure with hypoxia ([address] 10/16/2020    Alcohol withdrawal syndrome, with delirium ([address] 12/14/2019    Alcoholism ([address]     Anemia 10/2020    GI bleed    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24, the patient underwent 2 surgical resections without known recurrence 10/23/2020    Closed fracture of lateral portion of left tibial plateau 25/23/1665    COPD (chronic obstructive pulmonary disease) ([address]     CO2 retainer, on Bipap at night for this, Dr. Chalo Goyal ( last visit 11/20/2020 and note on chart )    Depression bipolar, major depressive disorder, ptsd, anxiety    Dysphagia     GI bleed 10/2020    Hypertension     Memory loss     Oxygen dependent     pt stated not needed as of 12/9/2020    Pain, joint, ankle and foot     Pancreatic lesion 10/2020    Dr. Ministerio Velez working up pt    Peripheral neuropathy     Seizures ([facility]     also baseline tremors-last sz summer 2020    Tension headache     Under care of team 07/01/2021    neuro-Dr Wan-[facility]-last visit june 2021    Under care of team 07/01/2021    pain management-Hamzah Martines-nery jimenez-last visit june 2021    Under care of team 07/01/2021    pulmonology-Dr Dueñas-[facility]-last virtual visit feb 2021    Under care of team 07/01/2021    psych-bahnfeldt NP-telemed-last visit may 2021    Under care of team 07/01/2021    jg[address]-last visit june 2021    Wellness examination 07/01/2021    pcp-[address]-last visit may 2021       Past Surgical  History:     Past Surgical History:   Procedure Laterality Date   [facility]    astrocytoma times 2    COLONOSCOPY N/A 10/22/2020    COLONOSCOPY DIAGNOSTIC performed by Anastasia Morris MD at [address]  7/28/2021    CT ABSCESS DRAIN SUBCUTANEOUS 7/28/2021 [facility] CT SCAN    ENDOSCOPIC ULTRASOUND (LOWER) N/A 12/9/2020    ENDOSCOPIC ULTRASOUND, UPPER WITH LINEAR SCOPE FOR BIOPSY OF MASS ON HEAD OF PANCREAS performed by Ángel Patel MD at [address] Left 7-3-13    ORIF tibial plateau    FRACTURE SURGERY Right     small finger metacarpal fracture    HAND SURGERY      pins    HYSTERECTOMY  2003    UPPER GASTROINTESTINAL ENDOSCOPY N/A 10/22/2020    EGD BIOPSY performed by Anastasia Morris MD at [address] [facility] 4/5/2021    EGD BIOPSY performed by Anastasia Morris MD at hospitals End[facility]       Medications:      vancomycin  1,000 mg Intravenous Q12H    vancomycin (VANCOCIN) intermittent dosing (placeholder)   Other RX Placeholder    amLODIPine  5 mg Oral Daily    baclofen  10 mg Oral TID    budesonide-formoterol  2 puff Inhalation BID    busPIRone  30 mg Oral BID WC    carBAMazepine  200 mg Oral TID    ferrous sulfate  325 mg Oral BID    folic acid  1 mg Oral Daily    nicotine  1 patch Transdermal Daily    pregabalin  200 mg Oral TID    topiramate  50 mg Oral BID    vitamin B-1  100 mg Oral Daily    sodium chloride flush  5-40 mL Intravenous 2 times per day    enoxaparin  40 mg Subcutaneous Daily    [START ON 8/10/2021] pantoprazole  40 mg Oral QAM AC       Social History:     Social History     Socioeconomic History    Marital status: Single     Spouse name: Not on file    Number of children: Not on file    Years of education: Not on file    Highest education level: Not on file   Occupational History    Not on file   Tobacco Use    Smoking status: Current Every Day Smoker     Packs/day: 0.50     Years: 30.00     Pack years: 15.00     Types: Cigarettes    Smokeless tobacco: Never Used    Tobacco comment: plans on quitting in the future    Vaping Use    Vaping Use: Never used   Substance and Sexual Activity    Alcohol use: Not Currently     Alcohol/week: 0.0 standard drinks    Drug use: Yes     Frequency: 2.0 times per week     Types: Marijuana    Sexual activity: Not on file   Other Topics Concern    Not on file   Social History Narrative    Not on file     Social Determinants of Health     Financial Resource Strain: Medium Risk    Difficulty of Paying Living Expenses: Somewhat hard   Food Insecurity: No Food Insecurity    Worried About Running Out of Food in the Last Year: Never true    [place] of Food in the Last Year: Never true   Transportation Needs:     Lack of Transportation (Medical):      Lack of Transportation (Non-Medical):    Physical Activity:     Days of Exercise per Week:     Minutes of Exercise per Session:    Stress:     Feeling of Stress :    Social Connections:     Frequency of Communication with Friends and Family:     Frequency of Social Gatherings with Friends and Family:     Attends Restoration Services:     Active Member of Clubs or Organizations:     Attends Club or Organization Meetings:     Marital Status:    Intimate Partner Violence:     Fear of Current or Ex-Partner:     Emotionally Abused:     Physically Abused:     Sexually Abused:        Family History:     Family History   Problem Relation Age of Onset    Other Father     Cancer Maternal Grandmother     Heart Disease Paternal Grandmother     Esophageal Cancer Maternal Aunt       Medical Decision Making:   I have independently reviewed/ordered the following labs:    CBC with Differential:   Recent Labs     08/09/21  0533   WBC 22.4*   HGB 10.5*   HCT 33.6*   PLT See Reflexed IPF Result   LYMPHOPCT 7*   MONOPCT 7     BMP:  Recent Labs     08/09/21 0533      K 4.0   CL 99   CO2 21   BUN 10   CREATININE 0.76     Hepatic Function Panel:   Recent Labs     08/09/21 0533   PROT 7.0   LABALBU 3.0*   BILITOT 0.22*   ALKPHOS 250*   ALT 10   AST 19     No results for input(s): RPR in the last 72 hours. No results for input(s): HIV in the last 72 hours. No results for input(s): BC in the last 72 hours. Lab Results   Component Value Date    CREATININE 0.76 08/09/2021    GLUCOSE 125 08/09/2021    GLUCOSE 92 04/30/2012       Detailed results: Thank you for allowing us to participate in the care of this patient. Please call with questions. This note is created with the assistance of a speech recognition program.  While intending to generate adocument that actually reflects the content of the visit, the document can still have some errors including those of syntax and sound a like substitutions which may escape proof reading. It such instances, actual meaningcan be extrapolated by contextual diversion.     Berta Mendoza MD  Office: [phone]  Perfect serve / office [phone]

## 2021-08-09 NOTE — ED PROVIDER NOTES
[facility] ED  Emergency Department  Emergency Medicine Resident Sign-out     Care of Junior Sharp was assumed from Dr. Elva Haddad and is being seen for No chief complaint on file. .  The patient's initial evaluation and plan have been discussed with the prior provider who initially evaluated the patient. EMERGENCY DEPARTMENT COURSE / MEDICAL DECISION MAKING:       MEDICATIONS GIVEN:  Orders Placed This Encounter   Medications    lactated ringers infusion 1,000 mL    piperacillin-tazobactam (ZOSYN) 3,375 mg in dextrose 5 % 50 mL IVPB (mini-bag)     Order Specific Question:   Antimicrobial Indications     Answer:   Intra-Abdominal Infection    fentaNYL (SUBLIMAZE) injection 50 mcg    vancomycin (VANCOCIN) 1000 mg in dextrose 5% 200 mL IVPB     Order Specific Question:   Antimicrobial Indications     Answer:   Skin and Soft Tissue Infection    vancomycin (VANCOCIN) intermittent dosing (placeholder)     Order Specific Question:   Antimicrobial Indications     Answer:   Skin and Soft Tissue Infection       LABS / RADIOLOGY:     Labs Reviewed   CULTURE, BLOOD 1   CULTURE, BLOOD 1   PROTIME-INR   APTT   TROPONIN   LACTATE, SEPSIS   HCG, SERUM, QUALITATIVE   CBC WITH AUTO DIFFERENTIAL   COMPREHENSIVE METABOLIC PANEL W/ REFLEX TO MG FOR LOW K   LIPASE   LACTATE, SEPSIS       XR CHEST PORTABLE    (Results Pending)   CT ABDOMEN PELVIS W IV CONTRAST Additional Contrast? None    (Results Pending)       RECENT VITALS:     Temp: 99.3 °F (37.4 °C),  Pulse: 110, Resp: 22, BP: 113/66, SpO2: 92 %    This patient is a 45 y.o. Female with abdominal pain, concern for FRANSISCO drain leaking fluid. Patient has significant history of pancreatic issues was recently admitted for similar complaints. FRANSISCO drain was placed by interventional radiology on 7/28/2021, patient had pericolic abscess with pancreatic fluid leaking.   Patient having worsening abdominal pain, this was a follow-up with GI on 8/11 for possible stent placement. Patient presented she was hypoxic upon presentation, required supplemental oxygen. Patient had septic work-up, broad-spectrum antibiotics, Vanco and Zosyn. Planning for scan of abdomen pelvis, patient received fentanyl for pain control    ED Course as of Aug 09 1324   Mon Aug 09, 2021   0810 [facility] with [facility] will meet patient at their service.    [WG]      ED Course User Index  [WG] Stephanie Garber DO       OUTSTANDING TASKS / RECOMMENDATIONS:      1. Await imaging and labs  2. Likely admission     FINAL IMPRESSION:     1. Abdominal pain, unspecified abdominal location        DISPOSITION:       DISPOSITION:  []  Discharge   []  Transfer -    [x]  Admission -     []  Against Medical Advice   []  Eloped   FOLLOW-UP: No follow-up provider specified.    DISCHARGE MEDICATIONS: New Prescriptions    No medications on file          Stephanie Garber DO  Emergency Medicine Resident  [address]  Resident  08/09/21 9755

## 2021-08-09 NOTE — H&P
[facility]  Office: [address], DO, Susanne Jose, DO, Yulissa Mccray, DO, Savannah Vossge Blood, DO, Bob Arnold MD, Yemi Aaron MD, Liliana Palomares MD, Neeta Coulter MD, Jona Bear MD, Skye Trevino MD, Shantel Goddard MD, Etienne Martines DO, Mariel Mayberry MD, Leidy Ellis DO, Fátima Kraus MD,  Shauna Sánchez DO, Chely Escalona MD, Nidhi Camarena MD, Amalia Lind MD, Pj Casillas MD, Juan F Willis MD, Marialuisa Burns MD, Katharina Mcnair MD, Asim Frost [facility], [facility], [facility], Gregoria Shirley, [facility], Chad Baltazar, CNS, Violette Guillen, CNP, Surinder Blanca, CNP, Jason Hernandez, CNP, Meena Saldaña, CNP, Gena Bennett, CNP, Katya Guerra PA-C, Chucky Primrose, DNP, Alexx Praandrew, CNP, Vick Deleon, CNP, Maria Del Carmen Jones, CNP, Radha Gonzales, CNP, Mary Cheek, CNP, Victorino La, CNP, Lazarus Mais, CNP, Tyrel Story, CNP         [address]    HISTORY AND PHYSICAL EXAMINATION            Date:   8/9/2021  Patient name:  Sterling Bryan  Date of admission:  8/9/2021  4:33 AM  MRN:   [id number]  Account:  [de-identified]  YOB: 1970  PCP:    Froilan Moraes MD  Room:   5165/5457-17  Code Status:    Full Code    Chief Complaint:     Chief Complaint   Patient presents with    Wound Check       History Obtained From:     patient, electronic medical record    History of Present Illness:     Sterling Bryan is a 45 y.o. Non- / non  female who presents with Wound Check   and is admitted to the hospital for the management of Acute recurrent pancreatitis. 76-year-old female past medical history of hypertension, smoking, seizure disorder, COPD, generalized anxiety disorder, history of alcohol abuse, prior history of compression fractures, headaches, recent colonic abscess evaluation and planned EUS as outpatient on 8/11/2021.   Per patient she is noted to have increased output from her abdominal drain last night with 50 mL as well as this morning with 70 mL and some seepage onto her wound dressings as well as onto her clothes. Patient also admits to having abdominal pain this morning with no exacerbating factors 9 out of 10 in pain. Per patient last changed on Thursday by home care nurse.      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 Thurmond Apgar ( last visit 2020 and note on chart )    Depression     bipolar, major depressive disorder, ptsd, anxiety    Dysphagia     GI bleed 10/2020    Hypertension     Memory loss     Oxygen dependent     pt stated not needed as of 2020    Pain, joint, ankle and foot     Pancreatic lesion 10/2020    Dr. Josefina Olguin working up pt    Peripheral neuropathy     Seizures ([address]     also baseline tremors-last sz summer 2020    Tension headache     Under care of team 2021    neuro-Dr Wan- alexander-last visit 2021    Under care of team 2021    pain management-Hamzah jimenez-last visit 2021    Under care of team 2021    pulmonology-Dr [facility]-last virtual visit 2021    Under care of team 2021    psych-bahnfeldt NP-telemed-last visit may 2021    Under care of team 2021    [address]-last visit 2021    Wellness examination 2021    pcp-Ludivina grimes-last visit may 2021        Past Surgical History:     Past Surgical History:   Procedure Laterality Date    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    COLONOSCOPY N/A 10/22/2020    COLONOSCOPY DIAGNOSTIC performed by Daniel Dawson MD at \[facility] Endoscopy    [address]  7/28/2021    CT ABSCESS DRAIN SUBCUTANEOUS 7/28/2021 [facility] CT SCAN    ENDOSCOPIC ULTRASOUND (LOWER) N/A 12/9/2020    ENDOSCOPIC ULTRASOUND, UPPER WITH LINEAR SCOPE FOR BIOPSY OF MASS ON HEAD OF PANCREAS performed by Scherrie Nyhan, MD at [address] Left 7-3-13    ORIF tibial plateau    FRACTURE SURGERY Right     small finger metacarpal fracture    HAND SURGERY      pins    HYSTERECTOMY  2003    UPPER GASTROINTESTINAL ENDOSCOPY N/A 10/22/2020    EGD BIOPSY performed by Daniel Dawson MD at [facility] N/A 4/5/2021    EGD BIOPSY performed by Daniel Dawson MD at \[facility]\"" Endoscopy        Medications Prior to Admission:     Prior to Admission medications    Medication Sig Start Date End Date Taking?  Authorizing Provider   busPIRone (BUSPAR) 30 MG tablet Take 30 mg by mouth 2 times daily (with meals) 8/5/21   Elia Campbell APRN - NP   hydrOXYzine (ATARAX) 25 MG tablet Take 1 tablet by mouth 3 times daily as needed for Anxiety 8/5/21   Elia Campbell, APRN - NP   traZODone (DESYREL) 50 MG tablet Take 1 tablet by mouth nightly as needed for Sleep 8/5/21 9/4/21  Elia Campbell APRN - NP   escitalopram (LEXAPRO) 5 MG tablet Take 1 tablet by mouth daily 8/5/21   Elia Campbell APRN - NP   acamprosate (CAMPRAL) 333 MG tablet Take 2 tablets by mouth 3 times daily 8/5/21 9/4/21  Elia Campbell APRN - NP   carBAMazepine (TEGRETOL) 200 MG tablet Take 1 tablet by mouth 3 times daily 8/3/21   Benny Pinks, APRN - NP   topiramate (TOPAMAX) 50 MG tablet Take 1 tablet by mouth 2 times daily 8/3/21   Benny Pinks, APRN - NP   spironolactone (ALDACTONE) 50 MG tablet Take 1 tablet by mouth daily 8/4/21   Benny Pinks, APRN - NP   nicotine (NICODERM CQ) 21 MG/24HR Place 1 patch onto the skin daily 8/4/21   LOI Rahman NP   nicotine polacrilex (NICORETTE) 2 MG gum Take 1 each by mouth every hour as needed for Smoking cessation 8/3/21   LOI Rahman NP   pregabalin (LYRICA) 200 MG capsule Take 1 capsule by mouth 3 times daily for 90 days.  7/2/21 9/30/21  Franci Scherer MD   KLOR-CON M20 20 MEQ extended release tablet TAKE ONE TABLET BY MOUTH DAILY 6/24/21   Ludivina Hayden MD   amLODIPine (NORVASC) 5 MG tablet TAKE ONE TABLET BY MOUTH DAILY 6/3/21   Ludivina Hayden MD   baclofen (LIORESAL) 10 MG tablet Take 1 tablet by mouth 3 times daily 5/25/21   Ludivina Hayden MD   ibuprofen (ADVIL;MOTRIN) 600 MG tablet Take 1 tablet by mouth 3 times daily as needed for Pain 5/25/21   Ludiivna Hayden MD   ferrous sulfate (IRON 325) 325 (65 Fe) MG tablet Take 1 tablet by mouth 2 times daily 4/22/21   Ludivina Hayden MD   rOPINIRole (REQUIP) 1 MG tablet Take 1 tablet by mouth nightly 4/1/21   Ludivina Hayden MD   budesonide-formoterol [facility]) 160-4.5 MCG/ACT AERO Inhale 2 puffs into the lungs 2 times daily 3/31/21   Ludivina Hayden MD   tiotropium (SPIRIVA RESPIMAT) 2.5 MCG/ACT AERS inhaler Inhale 2 puffs into the lungs daily 2/26/21 7/8/21  Alex Manning MD   sodium chloride (ALTAMIST SPRAY) 0.65 % nasal spray 1 spray by Nasal route as needed for Congestion 12/28/20   Ludivina Hayden MD   ACETAMINOPHEN EXTRA STRENGTH 500 MG tablet Take 500 mg by mouth 3 times daily as needed 5/8/20   Laisha Newby MD   albuterol sulfate HFA (VENTOLIN HFA) 108 (90 Base) MCG/ACT inhaler Inhale 2 puffs into the lungs 4 times daily as needed for Wheezing 6/11/20   Ludivina Hayden MD   vitamin B-1 (THIAMINE) 100 MG tablet Take 1 tablet by mouth daily 7/12/19   Ludivina Hayden MD   vitamin D (ERGOCALCIFEROL) 83801 units CAPS capsule Take 1 capsule by mouth once a week 6/19/19   Ludivina Hayden MD   folic acid (FOLVITE) 1 MG tablet Take 1 tablet by mouth daily 6/19/19   Mattie Lee MD Allergies:     Patient has no known allergies. Social History:     Tobacco:    reports that she has been smoking cigarettes. She has a 15.00 pack-year smoking history. She has never used smokeless tobacco.  Alcohol:      reports previous alcohol use. Drug Use:  reports current drug use. Frequency: 2.00 times per week. Drug: Marijuana. Family History:     Family History   Problem Relation Age of Onset    Other Father     Cancer Maternal Grandmother     Heart Disease Paternal Grandmother     Esophageal Cancer Maternal Aunt        Review of Systems:     Positive and Negative as described in HPI. CONSTITUTIONAL:  negative for fevers, chills, sweats, fatigue, weight loss  HEENT:  negative for vision, hearing changes, runny nose, throat pain  RESPIRATORY:  negative for shortness of breath, cough, congestion, wheezing  CARDIOVASCULAR:  negative for chest pain, palpitations  GASTROINTESTINAL:  negative for nausea, vomiting, diarrhea, constipation, change in bowel habits, abdominal pain   GENITOURINARY:  negative for difficulty of urination, burning with urination, frequency   INTEGUMENT:  negative for rash, skin lesions, easy bruising   HEMATOLOGIC/LYMPHATIC:  negative for swelling/edema   ALLERGIC/IMMUNOLOGIC:  negative for urticaria , itching  ENDOCRINE:  negative increase in drinking, increase in urination, hot or cold intolerance  MUSCULOSKELETAL:  negative joint pains, muscle aches, swelling of joints  NEUROLOGICAL:  negative for headaches, dizziness, lightheadedness, numbness, pain, tingling extremities  BEHAVIOR/PSYCH:  negative for depression, anxiety    Physical Exam:   /89   Pulse 82   Temp 98.5 °F (36.9 °C) (Oral)   Resp 22   Ht 5' 5\" (1.651 m)   Wt 128 lb 8.5 oz (58.3 kg)   SpO2 (!) 88%   BMI 21.39 kg/m²   Temp (24hrs), Av.9 °F (37.2 °C), Min:98.5 °F (36.9 °C), Max:99.3 °F (37.4 °C)    No results for input(s): POCGLU in the last 72 hours.     Intake/Output Summary (Last 24 hours) at 8/9/2021 1059  Last data filed at 8/9/2021 0920  Gross per 24 hour   Intake --   Output 90 ml   Net -90 ml       Physical Exam  Constitutional:       Appearance: She is obese. Comments: Chronically    HENT:      Head: Normocephalic and atraumatic. Right Ear: External ear normal.      Left Ear: External ear normal.      Nose: Nose normal.      Mouth/Throat:      Mouth: Mucous membranes are dry. Eyes:      Extraocular Movements: Extraocular movements intact. Pupils: Pupils are equal, round, and reactive to light. Cardiovascular:      Rate and Rhythm: Normal rate and regular rhythm. Pulses: Normal pulses. Heart sounds: Normal heart sounds. Pulmonary:      Effort: Prolonged expiration present. Breath sounds: Decreased air movement present. Examination of the right-upper field reveals decreased breath sounds. Examination of the left-upper field reveals decreased breath sounds. Examination of the right-middle field reveals decreased breath sounds. Examination of the right-lower field reveals decreased breath sounds. Examination of the left-lower field reveals decreased breath sounds. Decreased breath sounds present. No wheezing. Abdominal:      General: Abdomen is flat. Bowel sounds are normal.      Tenderness: There is generalized abdominal tenderness. There is no right CVA tenderness, left CVA tenderness or guarding. Negative signs include Hcoi's sign. Hernia: No hernia is present. Musculoskeletal:         General: No swelling. Cervical back: No rigidity or tenderness. Right lower leg: No edema. Left lower leg: No edema. Skin:     General: Skin is warm. Capillary Refill: Capillary refill takes less than 2 seconds. Coloration: Skin is not jaundiced. Neurological:      General: No focal deficit present. Mental Status: She is alert and oriented to person, place, and time. Mental status is at baseline.    Psychiatric: Attention and Perception: Attention normal. She does not perceive visual hallucinations. Mood and Affect: Mood is anxious. Speech: Speech normal.         Behavior: Behavior is cooperative. Thought Content:  Thought content normal.           Investigations:      Laboratory Testing:  Recent Results (from the past 24 hour(s))   CBC Auto Differential    Collection Time: 08/09/21  5:33 AM   Result Value Ref Range    WBC 22.4 (H) 3.5 - 11.3 k/uL    RBC 3.63 (L) 3.95 - 5.11 m/uL    Hemoglobin 10.5 (L) 11.9 - 15.1 g/dL    Hematocrit 33.6 (L) 36.3 - 47.1 %    MCV 92.6 82.6 - 102.9 fL    MCH 28.9 25.2 - 33.5 pg    MCHC 31.3 28.4 - 34.8 g/dL    RDW 14.4 11.8 - 14.4 %    Platelets See Reflexed IPF Result 138 - 453 k/uL    MPV NOT REPORTED 8.1 - 13.5 fL    NRBC Automated 0.0 0.0 per 100 WBC    Differential Type NOT REPORTED     WBC Morphology NOT REPORTED     RBC Morphology NOT REPORTED     Platelet Estimate NOT REPORTED     Immature Granulocytes 1 (H) 0 %    Seg Neutrophils 84 (H) 36 - 65 %    Lymphocytes 7 (L) 24 - 43 %    Monocytes 7 3 - 12 %    Eosinophils % 1 1 - 4 %    Basophils 0 0 - 2 %    Absolute Immature Granulocyte 0.22 0.00 - 0.30 k/uL    Segs Absolute 18.82 (H) 1.50 - 8.10 k/uL    Absolute Lymph # 1.57 1.10 - 3.70 k/uL    Absolute Mono # 1.57 (H) 0.10 - 1.20 k/uL    Absolute Eos # 0.22 0.00 - 0.44 k/uL    Basophils Absolute 0.00 0.00 - 0.20 k/uL    Morphology Normal    Comprehensive Metabolic Panel w/ Reflex to MG    Collection Time: 08/09/21  5:33 AM   Result Value Ref Range    Glucose 125 (H) 70 - 99 mg/dL    BUN 10 6 - 20 mg/dL    CREATININE 0.76 0.50 - 0.90 mg/dL    Bun/Cre Ratio NOT REPORTED 9 - 20    Calcium 8.9 8.6 - 10.4 mg/dL    Sodium 136 135 - 144 mmol/L    Potassium 4.0 3.7 - 5.3 mmol/L    Chloride 99 98 - 107 mmol/L    CO2 21 20 - 31 mmol/L    Anion Gap 16 9 - 17 mmol/L    Alkaline Phosphatase 250 (H) 35 - 104 U/L    ALT 10 5 - 33 U/L    AST 19 <32 U/L    Total Bilirubin 0.22 (L) 0.3 - 1.2 mg/dL    Total Protein 7.0 6.4 - 8.3 g/dL    Albumin 3.0 (L) 3.5 - 5.2 g/dL    Albumin/Globulin Ratio 0.8 (L) 1.0 - 2.5    GFR Non-African American >60 >60 mL/min    GFR African American >60 >60 mL/min    GFR Comment          GFR Staging NOT REPORTED    Lipase    Collection Time: 08/09/21  5:33 AM   Result Value Ref Range    Lipase 471 (H) 13 - 60 U/L   Protime-INR    Collection Time: 08/09/21  5:33 AM   Result Value Ref Range    Protime 10.9 9.1 - 12.3 sec    INR 1.0    APTT    Collection Time: 08/09/21  5:33 AM   Result Value Ref Range    PTT 24.6 20.5 - 30.5 sec   Troponin    Collection Time: 08/09/21  5:33 AM   Result Value Ref Range    Troponin, High Sensitivity 13 0 - 14 ng/L    Troponin T NOT REPORTED <0.03 ng/mL    Troponin Interp NOT REPORTED    Lactate, Sepsis    Collection Time: 08/09/21  5:33 AM   Result Value Ref Range    Lactic Acid, Sepsis NOT REPORTED 0.5 - 1.9 mmol/L    Lactic Acid, Sepsis, Whole Blood 1.8 0.5 - 1.9 mmol/L   HCG Qualitative, Serum    Collection Time: 08/09/21  5:33 AM   Result Value Ref Range    hCG Qual NEGATIVE NEGATIVE   Immature Platelet Fraction    Collection Time: 08/09/21  5:33 AM   Result Value Ref Range    Platelet, Immature Fraction 4.1 1.1 - 10.3 %    Platelet, Fluorescence 802 (H) 138 - 453 k/uL   Lactate, Sepsis    Collection Time: 08/09/21  6:56 AM   Result Value Ref Range    Lactic Acid, Sepsis NOT REPORTED 0.5 - 1.9 mmol/L    Lactic Acid, Sepsis, Whole Blood 1.2 0.5 - 1.9 mmol/L       Imaging/Diagnostics:  CT ABDOMEN PELVIS W IV CONTRAST Additional Contrast? None    Result Date: 8/9/2021  1. Approximately 50% decrease in size of the septated fluid collection in the left upper quadrant, with residual components measuring up to 7 x 5 cm with indwelling percutaneous drain. 2. New area adjacent edema within the mesentery at the splenic flexure of the colon has developed without additional abscess or fluid collection.  3. Persistent inflammatory changes at the splenic flexure of the colon, similar to the prior exam.  These could be reactive to the adjacent fluid collection. 4. Left lower lobe atelectasis. Mucous plugging in the right lower lobe segmental bronchi. 5. Subacute T12 and L5 compression fractures with 25% vertebral body height loss. XR CHEST PORTABLE    Result Date: 8/9/2021  1. Interval decreased bibasilar ill-defined consolidation suggesting pneumonia. 2. Interval decreased volume of mild bilateral pleural effusions. XR CHEST PORTABLE    Result Date: 8/2/2021  No significant interval change in basilar opacities and pleural effusions. Assessment :      Hospital Problems         Last Modified POA    * (Principal) Acute recurrent pancreatitis 8/9/2021 Yes    Essential hypertension 8/9/2021 Yes    Nicotine addiction 8/9/2021 Yes    Anxiety 8/9/2021 Yes    Tobacco use 8/9/2021 Yes    Seizure disorder ([address] 8/9/2021 Yes    Acute respiratory failure with hypoxia ([address] 8/9/2021 Yes    Pancreatic cyst 8/9/2021 Yes    Recurrent major depressive disorder ([address] 8/9/2021 Yes    AMAN (generalized anxiety disorder) 8/9/2021 Yes    Chronic pancreatitis ([address] 8/9/2021 Yes    Pseudocyst of pancreas 8/9/2021 Yes    Sepsis ([address] 8/9/2021 Yes          Plan:     Patient status inpatient in the Progressive Unit/Step down    reurrent pancreatitis, IV fluids, CLear liquid diet, trend lipase  Concern for worsening sepsis. Appreciate ID recommendations. receved vancomycin and zosyn in ER. Follow up cultures, hold on further antibiotics at this time. Smoker. Discussed again at length. Nicoderm. Appreciate wound care assistnace for drain site leak  COPD. Continue inhalers, encourage smoking cessation. Keep oxygen saturation above 88%  Seizure history. Was evaluated by neurology last admission tegretol 200 mg TID  Anxiety. Buspar 30 mg BID  GERD. protonix  EtOH abuse history. Has not had alcohol since discharge per patient.  Continue multivitamin, thiamin, folic acid, Iron deficinecy anemia. Continue Ferrous sulfate  Myalgia, baclofen    Consultations:   PHARMACY TO DOSE VANCOMYCIN  IP CONSULT TO HOSPITALIST  IP CONSULT TO SOCIAL WORK  IP CONSULT TO INFECTIOUS DISEASES    Patient is admitted as inpatient status because of co-morbidities listed above, severity of signs and symptoms as outlined, requirement for current medical therapies and most importantly because of direct risk to patient if care not provided in a hospital setting. Expected length of stay > 48 hours.     Guerita Golden MD  8/9/2021  10:59 AM    Copy sent to Dr. Yusuf Quinonez MD

## 2021-08-09 NOTE — ED NOTES
The following labs labeled with pt sticker and tubed:     [] Lavender   [] on ice   [] Blue   [] Green/yellow  [x] Green/black [x] on ice  [] Pink  [] Red  [] Yellow       [] COVID-19 swab    [] Rapid     [] Urine Sample  [] Pelvic Cultures    [] Blood Cultures            Wilfredo Coles RN  08/09/21 3351

## 2021-08-09 NOTE — PROGRESS NOTES
Pharmacy Note  Vancomycin Consult    Ni Mccann is a 45 y.o. female started on Vancomycin for an abdominal abscess; consult received from Dr. Kev Alonso to manage therapy. Also receiving the following antibiotics: Zosyn x 1 dose in ED.     Patient Active Problem List   Diagnosis    Acute bronchitis    Reflex sympathetic dystrophy of lower limb    Essential hypertension    Nicotine addiction    Psychophysiological insomnia    Anxiety    Alcoholic peripheral neuropathy (HCC)    Hypokalemia    Macrocytic anemia    Hypomagnesemia    Tobacco use    Ambulatory dysfunction    Seizure disorder (HCC)    COPD with acute exacerbation (HCC)    Paresthesia of lower extremity    Acute respiratory failure with hypoxia (HCC)    Pancreatic cyst    Recurrent major depressive disorder (HCC)    Elevated CA 19-9 level    Perineal mass, female    Esophagitis candidal     Condyloma    Astrocytoma (HCC) - 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24, the patient underwent 2 surgical resections without known recurrence    Alcohol use disorder, severe, in early remission ([address]    Metabolic encephalopathy    AMAN (generalized anxiety disorder)    Major depressive disorder, recurrent severe without psychotic features ([address]    Esophageal dysphagia    Chronic peripheral neuropathic pain    History of alcohol abuse    History of petit-mal seizures    Intractable episodic tension-type headache    Personal history of astrocytoma    Multilevel spine pain    Chronic pain syndrome    Marijuana abuse    Severe sepsis (HCC)    Closed fracture of fifth thoracic vertebra (HCC)    Diverticulitis of large intestine with abscess without bleeding    Elevated brain natriuretic peptide (BNP) level    Elevated alkaline phosphatase level    Chronic pancreatitis (HCC)    Erythema ab igne    Compression fracture of thoracic vertebra (HCC)    Compression of lumbar vertebra (HCC)    Metabolic acidosis with increased anion gap and accumulation of organic acids    Community acquired pneumonia of left lower lobe of lung    Septicemia ([address]    Alcohol-induced acute pancreatitis    Fluid collection of pancreas    Diverticulitis of large intestine without perforation or abscess with bleeding    Pseudocyst of pancreas    Bandemia       Allergies:  Patient has no known allergies. Temp max: 99.3    No results for input(s): BUN in the last 72 hours. No results for input(s): CREATININE in the last 72 hours. No results for input(s): WBC in the last 72 hours. No intake or output data in the 24 hours ending 08/09/21 0532    Culture Date      Source                       Results  pending    Ht Readings from Last 1 Encounters:   08/09/21 5' 5\" (1.651 m)        Wt Readings from Last 1 Encounters:   08/09/21 137 lb (62.1 kg)         Body mass index is 22.8 kg/m². Estimated Creatinine Clearance: 123 mL/min (A) (based on SCr of 0.48 mg/dL (L)). Goal Trough Level: 10-15 mcg/mL    Assessment/Plan:  Will initiate Vancomycin  1000 mg IV every 12 hours. Timing of trough level will be determined based on culture results, renal function, and clinical response. Thank you for the consult. Will continue to follow.     [facility]  8/9/2021 5:33 AM

## 2021-08-09 NOTE — PLAN OF CARE
Pt presents to hospital due to abdominal pain from abscesses, FRANSISCO drain present and putting out large amounts of brown/green drainage. Pt afebrile upon assessment, and NSR.  Complaining of pain that is unrelieved by Tylenol, will continue to monitor and report changes

## 2021-08-09 NOTE — ED PROVIDER NOTES
[address]  ED  Emergency Department Encounter  EmergencyMedicine Resident     Pt Name:Barbara Alvarez  MRN: [id number]  [place] 1970  Date of evaluation: 8/9/21  PCP:  Alex Adams MD    CHIEF COMPLAINT       No chief complaint on file. HISTORY OF PRESENT ILLNESS  (Location/Symptom, Timing/Onset, Context/Setting, Quality, Duration, Modifying Factors, Severity.)      Amarilis Pedersen is a 45 y.o. female who presents with 9/10 diffuse abdominal pain and tenderness to palpation. Patient has significant medical history for pericardial/intra-abdominal abscess with percutaneous drain placement attached to FRANSISCO bulb performed on 7/8 by interventional radiology under ultrasound guidance. Patient has history of pancreatic duct leak secondary to years of alcohol abuse, alcoholic pancreatitis, pancreatic strictures. She follows with GI here, planning endoscopic ultrasound possible pancreatic duct stent placement on 8/11 to assist in pancreas duct healing. Patient states she is completely not medically compliant, states that she has not changed the dressing to her percutaneous tube since she was sent home from the hospital, chief complaint is also fluid leaking from the site. Daily smoker, pack a day. States last alcoholic beverage was in December. Denies fever vomiting chest pain shortness of breath.  Denies headache dizziness    PAST MEDICAL / SURGICAL / SOCIAL / FAMILY HISTORY      has a past medical history of Acute respiratory failure with hypoxia ([address] Alcohol withdrawal syndrome, with delirium ([address] Alcoholism ([address] Anemia, 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24, the patient underwent 2 surgical resections without known recurrence, Closed fracture of lateral portion of left tibial plateau, COPD (chronic obstructive pulmonary disease) ([address] Depression, Dysphagia, GI bleed, Hypertension, Memory loss, Oxygen dependent, Pain, joint, ankle and foot, Pancreatic lesion, Peripheral neuropathy, Seizures ([facility] Tension headache, Under care of team, Under care of team, Under care of team, Under care of team, Under care of team, and Wellness examination. has a past surgical history that includes Hysterectomy (2003); Brain tumor excision (1989); fracture surgery (Left, 7-3-13); fracture surgery (Right); Upper gastrointestinal endoscopy (N/A, 10/22/2020); Colonoscopy (N/A, 10/22/2020); Endoscopic ultrasonography, GI (N/A, 12/9/2020); Upper gastrointestinal endoscopy (N/A, 4/5/2021); Hand surgery; and CT ABSCESS DRAINAGE (7/28/2021). Social History     Socioeconomic History    Marital status: Single     Spouse name: Not on file    Number of children: Not on file    Years of education: Not on file    Highest education level: Not on file   Occupational History    Not on file   Tobacco Use    Smoking status: Current Every Day Smoker     Packs/day: 0.50     Years: 30.00     Pack years: 15.00     Types: Cigarettes    Smokeless tobacco: Never Used    Tobacco comment: plans on quitting in the future    Vaping Use    Vaping Use: Never used   Substance and Sexual Activity    Alcohol use: Not Currently     Alcohol/week: 0.0 standard drinks    Drug use: Yes     Frequency: 2.0 times per week     Types: Marijuana    Sexual activity: Not on file   Other Topics Concern    Not on file   Social History Narrative    Not on file     Social Determinants of Health     Financial Resource Strain: Medium Risk    Difficulty of Paying Living Expenses: Somewhat hard   Food Insecurity: No Food Insecurity    Worried About Running Out of Food in the Last Year: Never true    [place] of Food in the Last Year: Never true   Transportation Needs:     Lack of Transportation (Medical):      Lack of Transportation (Non-Medical):    Physical Activity:     Days of Exercise per Week:     Minutes of Exercise per Session:    Stress:     Feeling of Stress :    Social Connections:     Frequency of Communication with Friends and Family:     Frequency of Social Gatherings with Friends and Family:     Attends Sabianist Services:     Active Member of Clubs or Organizations:     Attends Club or Organization Meetings:     Marital Status:    Intimate Partner Violence:     Fear of Current or Ex-Partner:     Emotionally Abused:     Physically Abused:     Sexually Abused:        Family History   Problem Relation Age of Onset    Other Father     Cancer Maternal Grandmother     Heart Disease Paternal Grandmother     Esophageal Cancer Maternal Aunt        Allergies:  Patient has no known allergies. Home Medications:  Prior to Admission medications    Medication Sig Start Date End Date Taking?  Authorizing Provider   busPIRone (BUSPAR) 30 MG tablet Take 30 mg by mouth 2 times daily (with meals) 8/5/21   LOI Campo - NP   hydrOXYzine (ATARAX) 25 MG tablet Take 1 tablet by mouth 3 times daily as needed for Anxiety 8/5/21   LOI Campo - NP   traZODone (DESYREL) 50 MG tablet Take 1 tablet by mouth nightly as needed for Sleep 8/5/21 9/4/21  LOI Campo - NP   escitalopram (LEXAPRO) 5 MG tablet Take 1 tablet by mouth daily 8/5/21   LOI Campo - NP   acamprosate (CAMPRAL) 333 MG tablet Take 2 tablets by mouth 3 times daily 8/5/21 9/4/21  LOI Campo - NP   carBAMazepine (TEGRETOL) 200 MG tablet Take 1 tablet by mouth 3 times daily 8/3/21   LOI Man - NP   topiramate (TOPAMAX) 50 MG tablet Take 1 tablet by mouth 2 times daily 8/3/21   LOI Man - NP   spironolactone (ALDACTONE) 50 MG tablet Take 1 tablet by mouth daily 8/4/21   LOI Man - NP   nicotine (NICODERM CQ) 21 MG/24HR Place 1 patch onto the skin daily 8/4/21   LOI Man - JW   nicotine polacrilex (NICORETTE) 2 MG gum Take 1 each by mouth every hour as needed for Smoking cessation 8/3/21   LOI Man - NP   pregabalin (LYRICA) 200 MG capsule Take 1 capsule by mouth 3 times daily for 90 days. 7/2/21 9/30/21  Irma Woodard MD   KLOR-CON M20 20 MEQ extended release tablet TAKE ONE TABLET BY MOUTH DAILY 6/24/21   Ludivina Curry MD   amLODIPine (NORVASC) 5 MG tablet TAKE ONE TABLET BY MOUTH DAILY 6/3/21   Ludivina Curry MD   baclofen (LIORESAL) 10 MG tablet Take 1 tablet by mouth 3 times daily 5/25/21   Ludivina Curry MD   ibuprofen (ADVIL;MOTRIN) 600 MG tablet Take 1 tablet by mouth 3 times daily as needed for Pain 5/25/21   Ludivina Curry MD   ferrous sulfate (IRON 325) 325 (65 Fe) MG tablet Take 1 tablet by mouth 2 times daily 4/22/21   Ludivina Curry MD   rOPINIRole (REQUIP) 1 MG tablet Take 1 tablet by mouth nightly 4/1/21   Ludivina Curry MD   budesonide-formoterol [facility]) 160-4.5 MCG/ACT AERO Inhale 2 puffs into the lungs 2 times daily 3/31/21   Ludivina Curry MD   tiotropium (SPIRIVA RESPIMAT) 2.5 MCG/ACT AERS inhaler Inhale 2 puffs into the lungs daily 2/26/21 7/8/21  Arabella Santiago MD   sodium chloride (ALTAMIST SPRAY) 0.65 % nasal spray 1 spray by Nasal route as needed for Congestion 12/28/20   Ludivina Curry MD   ACETAMINOPHEN EXTRA STRENGTH 500 MG tablet Take 500 mg by mouth 3 times daily as needed 5/8/20   Laisha Newby MD   albuterol sulfate HFA (VENTOLIN HFA) 108 (90 Base) MCG/ACT inhaler Inhale 2 puffs into the lungs 4 times daily as needed for Wheezing 6/11/20   Ludivina Curry MD   vitamin B-1 (THIAMINE) 100 MG tablet Take 1 tablet by mouth daily 7/12/19   Ludivina Curry MD   vitamin D (ERGOCALCIFEROL) 68203 units CAPS capsule Take 1 capsule by mouth once a week 6/19/19   Ludivina Curry MD   folic acid (FOLVITE) 1 MG tablet Take 1 tablet by mouth daily 6/19/19   Ludivina Curyr MD       REVIEW OF SYSTEMS    (2-9 systems for level 4, 10 or more for level 5)      Review of Systems   Constitutional: Negative for fever. HENT: Negative for congestion. Eyes: Negative for photophobia. Respiratory: Negative for shortness of breath. Cardiovascular: Negative for chest pain. Gastrointestinal: Positive for abdominal pain. Endocrine: Negative for polyuria. Genitourinary: Negative for dysuria. Musculoskeletal: Negative for arthralgias. Skin: Negative for color change. Allergic/Immunologic: Negative for immunocompromised state. Neurological: Negative for dizziness. Hematological: Does not bruise/bleed easily. Psychiatric/Behavioral: Negative for agitation. PHYSICAL EXAM   (up to 7 for level 4, 8 or more for level 5)      INITIAL VITALS:   /66   Pulse 110   Temp 99.3 °F (37.4 °C) (Oral)   Resp 22   Ht 5' 5\" (1.651 m)   Wt 137 lb (62.1 kg)   SpO2 92%   BMI 22.80 kg/m²     Physical Exam  Constitutional:       General: Appears in pain     Appearance: Patient pain. Normal weight. Hypoxic    HENT:      Head: Normocephalic and atraumatic. Nose: Nose normal.      Mouth/Throat: Mucous membranes are moist.  Uvula midline no oropharyngeal edema. Pharynx: Oropharynx is clear. Eyes:      Extraocular Movements: Extraocular movements intact. Conjunctiva/sclera: Conjunctivae normal.      Pupils: Pupils are equal, round, and reactive to light. Neck:      Musculoskeletal: Normal range of motion and neck supple. No neck rigidity. Cardiovascular:      Rate and Rhythm: Sinus tachycardia     Pulses: Normal pulses. Heart sounds: Normal heart sounds. No murmur. Pulmonary:      Effort: Tachypneic 22, SPO2 85% on room air     Breath sounds: Audible rattles from across the room    Abdominal:      General: Abdomen is flat. Bowel sounds are normal.      Tenderness: There is no abdominal tenderness. Musculoskeletal:     Normal range of motion. General: No swelling or tenderness. No LE edema    Skin:     General: Skin is warm. Capillary Refill: Capillary refill takes less than 2 seconds. Coloration: Skin is not jaundiced.    Skin site from around the percutaneous tube is erythematous, inflamed, slight purulent discharge    Neurological:      General: No focal deficit present. Mental Status: Alert and oriented to person, place, and time. Mental status is at baseline. Motor: No weakness.        DIFFERENTIAL  DIAGNOSIS     PLAN (LABS / IMAGING / EKG):  Orders Placed This Encounter   Procedures    Culture, Blood 1    Culture, Blood 1    XR CHEST PORTABLE    CT ABDOMEN PELVIS W IV CONTRAST Additional Contrast? None    CBC Auto Differential    Comprehensive Metabolic Panel w/ Reflex to MG    Lipase    Protime-INR    APTT    Troponin    Lactate, Sepsis    HCG Qualitative, Serum    Pharmacy to Dose: Vancomycin    EKG 12 Lead    Insert peripheral IV       MEDICATIONS ORDERED:  Orders Placed This Encounter   Medications    lactated ringers infusion 1,000 mL    piperacillin-tazobactam (ZOSYN) 3,375 mg in dextrose 5 % 50 mL IVPB (mini-bag)     Order Specific Question:   Antimicrobial Indications     Answer:   Intra-Abdominal Infection    fentaNYL (SUBLIMAZE) injection 50 mcg    vancomycin (VANCOCIN) 1000 mg in dextrose 5% 200 mL IVPB     Order Specific Question:   Antimicrobial Indications     Answer:   Skin and Soft Tissue Infection    vancomycin (VANCOCIN) intermittent dosing (placeholder)     Order Specific Question:   Antimicrobial Indications     Answer:   Skin and Soft Tissue Infection           DIAGNOSTIC RESULTS / EMERGENCY DEPARTMENT COURSE / MDM     LABS:  Results for orders placed or performed during the hospital encounter of 08/09/21   Lactate, Sepsis   Result Value Ref Range    Lactic Acid, Sepsis NOT REPORTED 0.5 - 1.9 mmol/L    Lactic Acid, Sepsis, Whole Blood 1.8 0.5 - 1.9 mmol/L   HCG Qualitative, Serum   Result Value Ref Range    hCG Qual NEGATIVE NEGATIVE         RADIOLOGY:  None    EKG  EKG Interpretation    Interpreted by me    Rhythm: normal sinus   Rate: normal  Axis: normal  Ectopy: none  Conduction: Poor R wave progression  ST Segments: no acute change  T Waves: no acute change  Q Waves: none    Clinical Impression: Nonspecific EKG    All EKG's are interpreted by the Emergency Department Physician who either signs or Co-signs this chart in the absence of a cardiologist.    EMERGENCY DEPARTMENT COURSE:  Patient is histrionic difficult HPI physical exam and ROS. States she has not been changing her dressing because \"she could not see it\". Change the dressing, dressing was black, soaked with fluid. Physical exam as above. Patient is mild to moderate respiratory distress SPO2 85% room air with audible rales from across the room. Concern for pneumonia, sepsis, intra-abdominal infection. Will do sepsis labs CT scan belly    Signed out to Dr Sneed Lips:  None    CONSULTS:  PHARMACY TO DOSE VANCOMYCIN    CRITICAL CARE:  None    FINAL IMPRESSION      1. Abdominal pain, unspecified abdominal location          DISPOSITION / PLAN     DISPOSITION        PATIENT REFERRED TO:  No follow-up provider specified.     DISCHARGE MEDICATIONS:  New Prescriptions    No medications on file       Dennie Gate, MD  Emergency Medicine Resident    (Please note that portions of thisnote were completed with a voice recognition program.  Efforts were made to edit the dictations but occasionally words are mis-transcribed.)       Dennie Gate, MD  Resident  08/09/21 [id number]       Dennie Gate, MD  Resident  08/09/21 [id number]

## 2021-08-09 NOTE — CARE COORDINATION
Case Management Initial Discharge Plan  Meg Cancino,             Met with:patient to discuss discharge plans. Information verified: address, contacts, phone number, , insurance Yes  Insurance Provider: [employer]    Emergency Contact/Next of Kin name & number: Rom Alanis, mother at [phone]  Who are involved in patient's support system? mother    PCP: Josh Mcqueen MD  Date of last visit: 2 wks. ago      Discharge Planning    Living Arrangements:  Parent     Home has 1 stories  1 stairs to climb to get into front door,   Location of bedroom/bathroom in home main    Patient able to perform ADL's:Independent    Current Services (outpatient & in home) [address] [facility].  DME equipment: none  DME provider: unknown    Is patient receiving oral anticoagulation therapy? No    If indicated:   Physician managing anticoagulation treatment: n/a  Where does patient obtain lab work for ATC treatment? n/a      Potential Assistance Needed:  [address]    Patient agreeable to home care: Yes  Freedom of choice provided:  no    Prior [facility] and [facility] \"would return. \"  Agreeable to SNF/Rehab: No  [place] of choice provided: no     Evaluation: no    Expected Discharge date:       Patient expects to be discharged to: If home: is the family and/or caregiver wiling & able to provide support at home? yes  Who will be providing this support? mother    Follow Up Appointment: Best Day/ Time:      Transportation provider: [facility]  Transportation arrangements needed for discharge: Yes    Readmission Risk              Risk of Unplanned Readmission:  28             Does patient have a readmission risk score greater than 14?: Yes  If yes, follow-up appointment must be made within 7 days of discharge. Goals of Care:       Educated pt on transitional options, provided freedom of choice and are agreeable with plan      Discharge Plan: Home via med cab per pt. Would return [facility] SN[facility] if needed. Current with med 1. Referral sent.            Electronically signed by Chapis Marie RN on 8/9/21 at 6:10 PM EDT

## 2021-08-09 NOTE — ED PROVIDER NOTES
[address] HANDOFF       Handoff taken on the following patient from prior Attending Physician:  Pt Name: Josiane Harris  PCP:  Marshall Lanier MD    [address]  I was available and discussed any additional care issues that arose and coordinated the management plans with the resident(s) caring for the patient during my duty period. Any areas of disagreement with resident's documentation of care or procedures are noted on the chart. I was personally present for the key portions of any/all procedures during my duty period. I have documented in the chart those procedures where I was not present during the key portions.          CHIEF COMPLAINT       Chief Complaint   Patient presents with    Wound Check         CURRENT MEDICATIONS     Previous Medications  Previous Medications    ACAMPROSATE (CAMPRAL) 333 MG TABLET    Take 2 tablets by mouth 3 times daily    ACETAMINOPHEN EXTRA STRENGTH 500 MG TABLET    Take 500 mg by mouth 3 times daily as needed    ALBUTEROL SULFATE HFA (VENTOLIN HFA) 108 (90 BASE) MCG/ACT INHALER    Inhale 2 puffs into the lungs 4 times daily as needed for Wheezing    AMLODIPINE (NORVASC) 5 MG TABLET    TAKE ONE TABLET BY MOUTH DAILY    BACLOFEN (LIORESAL) 10 MG TABLET    Take 1 tablet by mouth 3 times daily    BUDESONIDE-FORMOTEROL (SYMBICORT) 160-4.5 MCG/ACT AERO    Inhale 2 puffs into the lungs 2 times daily    BUSPIRONE (BUSPAR) 30 MG TABLET    Take 30 mg by mouth 2 times daily (with meals)    CARBAMAZEPINE (TEGRETOL) 200 MG TABLET    Take 1 tablet by mouth 3 times daily    ESCITALOPRAM (LEXAPRO) 5 MG TABLET    Take 1 tablet by mouth daily    FERROUS SULFATE (IRON 325) 325 (65 FE) MG TABLET    Take 1 tablet by mouth 2 times daily    FOLIC ACID (FOLVITE) 1 MG TABLET    Take 1 tablet by mouth daily    HYDROXYZINE (ATARAX) 25 MG TABLET    Take 1 tablet by mouth 3 times daily as needed for Anxiety    IBUPROFEN (ADVIL;MOTRIN) 600 MG TABLET    Take 1 tablet by mouth 3 times daily as needed for Pain    KLOR-CON M20 20 MEQ EXTENDED RELEASE TABLET    TAKE ONE TABLET BY MOUTH DAILY    NICOTINE (NICODERM CQ) 21 MG/24HR    Place 1 patch onto the skin daily    NICOTINE POLACRILEX (NICORETTE) 2 MG GUM    Take 1 each by mouth every hour as needed for Smoking cessation    PREGABALIN (LYRICA) 200 MG CAPSULE    Take 1 capsule by mouth 3 times daily for 90 days. ROPINIROLE (REQUIP) 1 MG TABLET    Take 1 tablet by mouth nightly    SODIUM CHLORIDE (ALTAMIST SPRAY) 0.65 % NASAL SPRAY    1 spray by Nasal route as needed for Congestion    SPIRONOLACTONE (ALDACTONE) 50 MG TABLET    Take 1 tablet by mouth daily    TIOTROPIUM (SPIRIVA RESPIMAT) 2.5 MCG/ACT AERS INHALER    Inhale 2 puffs into the lungs daily    TOPIRAMATE (TOPAMAX) 50 MG TABLET    Take 1 tablet by mouth 2 times daily    TRAZODONE (DESYREL) 50 MG TABLET    Take 1 tablet by mouth nightly as needed for Sleep    VITAMIN B-1 (THIAMINE) 100 MG TABLET    Take 1 tablet by mouth daily    VITAMIN D (ERGOCALCIFEROL) 33349 UNITS CAPS CAPSULE    Take 1 capsule by mouth once a week       Encounter Medications  Orders Placed This Encounter   Medications    lactated ringers infusion 1,000 mL    piperacillin-tazobactam (ZOSYN) 3,375 mg in dextrose 5 % 50 mL IVPB (mini-bag)     Order Specific Question:   Antimicrobial Indications     Answer:   Intra-Abdominal Infection    fentaNYL (SUBLIMAZE) injection 50 mcg    vancomycin (VANCOCIN) 1000 mg in dextrose 5% 200 mL IVPB     Order Specific Question:   Antimicrobial Indications     Answer:   Skin and Soft Tissue Infection    vancomycin (VANCOCIN) intermittent dosing (placeholder)     Order Specific Question:   Antimicrobial Indications     Answer:   Skin and Soft Tissue Infection    iopamidol (ISOVUE-370) 76 % injection 75 mL       ALLERGIES     has No Known Allergies.       RECENT VITALS:   Temp: 99.3 °F (37.4 °C),  Pulse: 90, Resp: 21, BP: (!) 120/92    RADIOLOGY:   XR CHEST PORTABLE Final Result   1. Interval decreased bibasilar ill-defined consolidation suggesting   pneumonia. 2. Interval decreased volume of mild bilateral pleural effusions. CT ABDOMEN PELVIS W IV CONTRAST Additional Contrast? None    (Results Pending)       LABS:  Labs Reviewed   CBC WITH AUTO DIFFERENTIAL - Abnormal; Notable for the following components:       Result Value    WBC 22.4 (*)     RBC 3.63 (*)     Hemoglobin 10.5 (*)     Hematocrit 33.6 (*)     Immature Granulocytes 1 (*)     Seg Neutrophils 84 (*)     Lymphocytes 7 (*)     Segs Absolute 18.82 (*)     Absolute Mono # 1.57 (*)     All other components within normal limits   COMPREHENSIVE METABOLIC PANEL W/ REFLEX TO MG FOR LOW K - Abnormal; Notable for the following components:    Glucose 125 (*)     Alkaline Phosphatase 250 (*)     Total Bilirubin 0.22 (*)     Albumin 3.0 (*)     Albumin/Globulin Ratio 0.8 (*)     All other components within normal limits   LIPASE - Abnormal; Notable for the following components:    Lipase 471 (*)     All other components within normal limits   IMMATURE PLATELET FRACTION - Abnormal; Notable for the following components:    Platelet, Fluorescence 802 (*)     All other components within normal limits   CULTURE, BLOOD 1   CULTURE, BLOOD 1   PROTIME-INR   APTT   TROPONIN   LACTATE, SEPSIS   HCG, SERUM, QUALITATIVE   LACTATE, SEPSIS           PLAN/ TASKS OUTSTANDING           (Please note that portions of this note were completed with a voice recognition program.  Efforts were made to edit the dictations but occasionally words are mis-transcribed.)    Brad Rodriguez MD,, MD, F.A.C.E.P.   Attending Emergency Physician       Brad Rodriguez MD  08/09/21 [id number]

## 2021-08-09 NOTE — PROGRESS NOTES
[facility]  Nurse  Consult Note       NAME:  Desmond Wong RECORD NUMBER:  [id number]  AGE: 45 y.o. GENDER: female  : 1970  TODAY'S DATE:  2021    Subjective   Reason for [id number] Nurse Evaluation and Assessment:   Drainage from left flank drain site.       Ana Delaney is a 45 y.o. female referred by:   [x] Physician  [] Nursing          PAST MEDICAL HISTORY        Diagnosis Date    Acute respiratory failure with hypoxia ([address] 10/16/2020    Alcohol withdrawal syndrome, with delirium ([address] 2019    Alcoholism ([address]     Anemia 10/2020    GI bleed    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24, the patient underwent 2 surgical resections without known recurrence 10/23/2020    Closed fracture of lateral portion of left tibial plateau     COPD (chronic obstructive pulmonary disease) ([address]     CO2 retainer, on Bipap at night for this, Dr. Nusrat Jorgensen ( last visit 2020 and note on chart )    Depression     bipolar, major depressive disorder, ptsd, anxiety    Dysphagia     GI bleed 10/2020    Hypertension     Memory loss     Oxygen dependent     pt stated not needed as of 2020    Pain, joint, ankle and foot     Pancreatic lesion 10/2020    Dr. Mc Calderon working up pt    Peripheral neuropathy     Seizures ([address]     also baseline tremors-last sz summer 2020    Tension headache     Under care of team 2021    neuro-Dr Wan-st munoz-last visit 2021    Under care of team 2021    pain management-Hamzah jimenez-last visit 2021    Under care of team 2021    pulmonology-Dr Raudel munoz-last virtual visit 2021    Under care of team 2021    psych-bahnfeldt NP-telemed-last visit may 2021    Under care of team 2021    dl-[address]-last visit 2021    Wellness examination 2021    pcp-Ludivina grimes-last visit may 2021       PAST SURGICAL HISTORY    Past Surgical History:   Procedure Laterality Date    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    COLONOSCOPY N/A 10/22/2020    COLONOSCOPY DIAGNOSTIC performed by Josey Rodriguez MD at [address]  7/28/2021    CT ABSCESS DRAIN SUBCUTANEOUS 7/28/2021 [facility] CT SCAN    ENDOSCOPIC ULTRASOUND (LOWER) N/A 12/9/2020    ENDOSCOPIC ULTRASOUND, UPPER WITH LINEAR SCOPE FOR BIOPSY OF MASS ON HEAD OF PANCREAS performed by Gilles Boast, MD at [address] Left 7-3-13    ORIF tibial plateau    FRACTURE SURGERY Right     small finger metacarpal fracture    HAND SURGERY      pins    HYSTERECTOMY  2003    UPPER GASTROINTESTINAL ENDOSCOPY N/A 10/22/2020    EGD BIOPSY performed by Josey Rodriguez MD at [facility] N/A 4/5/2021    EGD BIOPSY performed by Josye Rodriguez MD at [facility]       FAMILY HISTORY    Family History   Problem Relation Age of Onset    Other Father     Cancer Maternal Grandmother     Heart Disease Paternal Grandmother     Esophageal Cancer Maternal Aunt        SOCIAL HISTORY    Social History     Tobacco Use    Smoking status: Current Every Day Smoker     Packs/day: 1.00     Years: 30.00     Pack years: 30.00     Types: Cigarettes    Smokeless tobacco: Never Used    Tobacco comment: plans on quitting in the future    Vaping Use    Vaping Use: Never used   Substance Use Topics    Alcohol use: Not Currently     Alcohol/week: 0.0 standard drinks    Drug use: Yes     Frequency: 2.0 times per week     Types: Marijuana       ALLERGIES    No Known Allergies    MEDICATIONS    No current facility-administered medications on file prior to encounter.      Current Outpatient Medications on File Prior to Encounter   Medication Sig Dispense Refill    busPIRone (BUSPAR) 30 MG tablet Take 30 mg by mouth 2 times daily (with meals) 60 tablet 0    hydrOXYzine (ATARAX) 25 MG tablet Take 1 tablet by mouth 3 times daily as needed for Anxiety 90 tablet 0    traZODone (DESYREL) 50 MG tablet Take 1 tablet by mouth nightly as needed for Sleep 30 tablet 0    escitalopram (LEXAPRO) 5 MG tablet Take 1 tablet by mouth daily 30 tablet 1    acamprosate (CAMPRAL) 333 MG tablet Take 2 tablets by mouth 3 times daily 180 tablet 0    carBAMazepine (TEGRETOL) 200 MG tablet Take 1 tablet by mouth 3 times daily 90 tablet 0    topiramate (TOPAMAX) 50 MG tablet Take 1 tablet by mouth 2 times daily 60 tablet 0    spironolactone (ALDACTONE) 50 MG tablet Take 1 tablet by mouth daily 30 tablet 0    nicotine (NICODERM CQ) 21 MG/24HR Place 1 patch onto the skin daily 30 patch 0    nicotine polacrilex (NICORETTE) 2 MG gum Take 1 each by mouth every hour as needed for Smoking cessation 110 each 0    pregabalin (LYRICA) 200 MG capsule Take 1 capsule by mouth 3 times daily for 90 days.  90 capsule 2    KLOR-CON M20 20 MEQ extended release tablet TAKE ONE TABLET BY MOUTH DAILY 30 tablet 2    amLODIPine (NORVASC) 5 MG tablet TAKE ONE TABLET BY MOUTH DAILY 90 tablet 0    baclofen (LIORESAL) 10 MG tablet Take 1 tablet by mouth 3 times daily 60 tablet 1    ibuprofen (ADVIL;MOTRIN) 600 MG tablet Take 1 tablet by mouth 3 times daily as needed for Pain 30 tablet 0    ferrous sulfate (IRON 325) 325 (65 Fe) MG tablet Take 1 tablet by mouth 2 times daily 180 tablet 1    rOPINIRole (REQUIP) 1 MG tablet Take 1 tablet by mouth nightly 90 tablet 1    budesonide-formoterol (SYMBICORT) 160-4.5 MCG/ACT AERO Inhale 2 puffs into the lungs 2 times daily 3 Inhaler 1    tiotropium (SPIRIVA RESPIMAT) 2.5 MCG/ACT AERS inhaler Inhale 2 puffs into the lungs daily 1 Inhaler 11    sodium chloride (ALTAMIST SPRAY) 0.65 % nasal spray 1 spray by Nasal route as needed for Congestion 1 Bottle 3    ACETAMINOPHEN EXTRA STRENGTH 500 MG tablet Take 500 mg by mouth 3 times daily as needed      albuterol sulfate HFA (VENTOLIN HFA) 108 (90 Base) MCG/ACT inhaler Inhale 2 puffs into the lungs 4 times daily as needed for Wheezing 1 Inhaler 5    vitamin B-1 (THIAMINE) 100 MG tablet Take 1 tablet by mouth daily 30 tablet 3    vitamin D (ERGOCALCIFEROL) 34442 units CAPS capsule Take 1 capsule by mouth once a week 12 capsule 1    folic acid (FOLVITE) 1 MG tablet Take 1 tablet by mouth daily 90 tablet 1       Objective    /80   Pulse 91   Temp 98.5 °F (36.9 °C) (Oral)   Resp 22   Ht 5' 5\" (1.651 m)   Wt 128 lb 8.5 oz (58.3 kg)   SpO2 (!) 84%   BMI 21.39 kg/m²     LABS:  WBC:    Lab Results   Component Value Date    WBC 22.4 08/09/2021     H/H:    Lab Results   Component Value Date    HGB 10.5 08/09/2021    HCT 33.6 08/09/2021     PTT:    Lab Results   Component Value Date    APTT 24.6 08/09/2021   [APTT}  PT/INR:    Lab Results   Component Value Date    PROTIME 10.9 08/09/2021    INR 1.0 08/09/2021     HgBA1c:    Lab Results   Component Value Date    LABA1C 5.5 04/13/2021       Assessment   Benja Risk Score: Benja Scale Score: 17    Patient Active Problem List   Diagnosis Code    Acute bronchitis J20.9    Reflex sympathetic dystrophy of lower limb G90.529    Essential hypertension I10    Nicotine addiction F17.200    Psychophysiological insomnia F51.04    Anxiety C82.7    Alcoholic peripheral neuropathy (HCC) G62.1    Hypokalemia E87.6    Macrocytic anemia D53.9    Hypomagnesemia E83.42    Tobacco use Z72.0    Ambulatory dysfunction R26.2    Seizure disorder (HCC) G40.909    COPD with acute exacerbation (HCC) J44.1    Paresthesia of lower extremity R20.2    Acute respiratory failure with hypoxia (HCC) J96.01    Pancreatic cyst K86.2    Recurrent major depressive disorder (HCC) F33.9    Elevated CA 19-9 level R97.8    Perineal mass, female N90.89    Esophagitis candidal  B37.81    Condyloma A63.0    Astrocytoma (HCC) - 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24, the patient underwent 2 surgical resections without known recurrence C71.9    Alcohol use disorder, severe, in early remission ([facility] B15.01    Metabolic encephalopathy N82.39    AMAN (generalized anxiety disorder) F41.1    Major depressive disorder, recurrent severe without psychotic features ([facility] F33.2    Esophageal dysphagia R13.10    Chronic peripheral neuropathic pain M79.2, G89.29    History of alcohol abuse F10.11    History of petit-mal seizures Z86.69    Intractable episodic tension-type headache G44. [address] history of astrocytoma Z85.841    Multilevel spine pain M54.9    Chronic pain syndrome G89.4    Marijuana abuse F12.10    Severe sepsis (HCC) A41.9, R65.20    Closed fracture of fifth thoracic vertebra ([facility]) S22.059A    Diverticulitis of large intestine with abscess without bleeding K57.20    Elevated brain natriuretic peptide (BNP) level R79.89    Elevated alkaline phosphatase level R74.8    Chronic pancreatitis ([facility]) K86.1    Erythema ab igne L59.0    Compression fracture of thoracic vertebra ([facility]) S22.000A    Compression of lumbar vertebra ([facility]) G42.928J    Metabolic acidosis with increased anion gap and accumulation of organic acids E87.2    Community acquired pneumonia of left lower lobe of lung J18.9    Septicemia ([facility]) A41.9    Alcohol-induced acute pancreatitis K85.20    Fluid collection of pancreas K86.89    Diverticulitis of large intestine without perforation or abscess with bleeding K57.33    Pseudocyst of pancreas K86.3    Bandemia D72.825    Sepsis (HCC) A41.9    Acute recurrent pancreatitis K85.90    Atelectasis of left lung J98.11       Measurements:        08/09/21 1630   Closed/Suction Drain Left;Lateral Abdomen Bulb 8 [place]   Placement Date/Time: 07/28/21 1008   Timeout: (c)   Inserted by: DR. Cody Small  Orientation: Left;Lateral  Location: Abdomen  Drain Tube Type: Bulb  Size (fr): 8 [place]   Site Description Reddened   Dressing Status Changed   Drainage Appearance Brown   Status To bulb suction      MASD and MARSI to the skin surrounding the left flank drain.   Area was cleansed with foam cleanser and patted dry. SurePREP barrier film was applied. Strips of Brava protective sheet was applied in window pane fashion around the drain site to afix tape to. Drain sponges and 4x4 gauze placed over the site. Change daily as needed  Keep bulb compressed to facilitate removal of fluid      Response to treatment:  Well tolerated by patient. Plan   Plan of Care:     Left flank drain site:  Affix tape to strips of hydrocolloid to avoid skin damage from repeated tape removal.  Change drain sponges prn to keep skin dry. Specialty Bed Required : N/A   [] Low Air Loss   [] Pressure Redistribution  [] Fluid Immersion  [] Bariatric  [] Total Pressure Relief  [] Other:     Current Diet: ADULT DIET;  Clear Liquid    Discharge Plan:  Placement for patient upon discharge: home with support    Patient appropriate for Outpatient [address]: N/A      Patient/Caregiver Teaching:  Level of patient/caregiver understanding able to:   [] Indicates understanding       [] Needs reinforcement  [] Unsuccessful      [x] Verbal Understanding  [] Demonstrated understanding       [] No evidence of learning  [] Refused teaching         [] Octavio Sears RN BSN, [employer]

## 2021-08-10 ENCOUNTER — HOSPITAL ENCOUNTER (OUTPATIENT)
Dept: PREADMISSION TESTING | Age: 51
Discharge: HOME | End: 2021-08-14
Payer: MEDICARE

## 2021-08-10 VITALS — WEIGHT: 130.51 LBS | BODY MASS INDEX: 21.74 KG/M2 | HEIGHT: 65 IN

## 2021-08-10 LAB
ABSOLUTE EOS #: 0.23 K/UL (ref 0–0.44)
ABSOLUTE IMMATURE GRANULOCYTE: 0.08 K/UL (ref 0–0.3)
ABSOLUTE LYMPH #: 1.77 K/UL (ref 1.1–3.7)
ABSOLUTE MONO #: 1.04 K/UL (ref 0.1–1.2)
ANION GAP SERPL CALCULATED.3IONS-SCNC: 12 MMOL/L (ref 9–17)
BASOPHILS # BLD: 0 % (ref 0–2)
BASOPHILS ABSOLUTE: 0.03 K/UL (ref 0–0.2)
BUN BLDV-MCNC: 5 MG/DL (ref 6–20)
BUN/CREAT BLD: ABNORMAL (ref 9–20)
CALCIUM SERPL-MCNC: 8.2 MG/DL (ref 8.6–10.4)
CHLORIDE BLD-SCNC: 104 MMOL/L (ref 98–107)
CO2: 22 MMOL/L (ref 20–31)
CREAT SERPL-MCNC: 0.55 MG/DL (ref 0.5–0.9)
DIFFERENTIAL TYPE: ABNORMAL
EKG ATRIAL RATE: 93 BPM
EKG P AXIS: 46 DEGREES
EKG P-R INTERVAL: 212 MS
EKG Q-T INTERVAL: 352 MS
EKG QRS DURATION: 86 MS
EKG QTC CALCULATION (BAZETT): 437 MS
EKG R AXIS: 33 DEGREES
EKG T AXIS: 52 DEGREES
EKG VENTRICULAR RATE: 93 BPM
EOSINOPHILS RELATIVE PERCENT: 3 % (ref 1–4)
GFR AFRICAN AMERICAN: >60 ML/MIN
GFR NON-AFRICAN AMERICAN: >60 ML/MIN
GFR SERPL CREATININE-BSD FRML MDRD: ABNORMAL ML/MIN/{1.73_M2}
GFR SERPL CREATININE-BSD FRML MDRD: ABNORMAL ML/MIN/{1.73_M2}
GLUCOSE BLD-MCNC: 86 MG/DL (ref 70–99)
HCT VFR BLD CALC: 28 % (ref 36.3–47.1)
HEMOGLOBIN: 8.3 G/DL (ref 11.9–15.1)
IMMATURE GRANULOCYTES: 1 %
LIPASE: 72 U/L (ref 13–60)
LYMPHOCYTES # BLD: 20 % (ref 24–43)
MCH RBC QN AUTO: 29 PG (ref 25.2–33.5)
MCHC RBC AUTO-ENTMCNC: 29.6 G/DL (ref 28.4–34.8)
MCV RBC AUTO: 97.9 FL (ref 82.6–102.9)
MONOCYTES # BLD: 12 % (ref 3–12)
NRBC AUTOMATED: 0 PER 100 WBC
PDW BLD-RTO: 14.6 % (ref 11.8–14.4)
PLATELET # BLD: 694 K/UL (ref 138–453)
PLATELET ESTIMATE: ABNORMAL
PMV BLD AUTO: 9.6 FL (ref 8.1–13.5)
POTASSIUM SERPL-SCNC: 3.6 MMOL/L (ref 3.7–5.3)
RBC # BLD: 2.86 M/UL (ref 3.95–5.11)
RBC # BLD: ABNORMAL 10*6/UL
SEG NEUTROPHILS: 64 % (ref 36–65)
SEGMENTED NEUTROPHILS ABSOLUTE COUNT: 5.67 K/UL (ref 1.5–8.1)
SODIUM BLD-SCNC: 138 MMOL/L (ref 135–144)
WBC # BLD: 8.8 K/UL (ref 3.5–11.3)
WBC # BLD: ABNORMAL 10*3/UL

## 2021-08-10 PROCEDURE — 6370000000 HC RX 637 (ALT 250 FOR IP): Performed by: NURSE PRACTITIONER

## 2021-08-10 PROCEDURE — 99232 SBSQ HOSP IP/OBS MODERATE 35: CPT | Performed by: FAMILY MEDICINE

## 2021-08-10 PROCEDURE — 93010 ELECTROCARDIOGRAM REPORT: CPT | Performed by: INTERNAL MEDICINE

## 2021-08-10 PROCEDURE — 36415 COLL VENOUS BLD VENIPUNCTURE: CPT

## 2021-08-10 PROCEDURE — 99232 SBSQ HOSP IP/OBS MODERATE 35: CPT | Performed by: INTERNAL MEDICINE

## 2021-08-10 PROCEDURE — 2700000000 HC OXYGEN THERAPY PER DAY

## 2021-08-10 PROCEDURE — 94761 N-INVAS EAR/PLS OXIMETRY MLT: CPT

## 2021-08-10 PROCEDURE — 6370000000 HC RX 637 (ALT 250 FOR IP): Performed by: STUDENT IN AN ORGANIZED HEALTH CARE EDUCATION/TRAINING PROGRAM

## 2021-08-10 PROCEDURE — 2060000000 HC ICU INTERMEDIATE R&B

## 2021-08-10 PROCEDURE — 80048 BASIC METABOLIC PNL TOTAL CA: CPT

## 2021-08-10 PROCEDURE — 6360000002 HC RX W HCPCS: Performed by: STUDENT IN AN ORGANIZED HEALTH CARE EDUCATION/TRAINING PROGRAM

## 2021-08-10 PROCEDURE — 94640 AIRWAY INHALATION TREATMENT: CPT

## 2021-08-10 PROCEDURE — 83690 ASSAY OF LIPASE: CPT

## 2021-08-10 PROCEDURE — 99254 IP/OBS CNSLTJ NEW/EST MOD 60: CPT | Performed by: INTERNAL MEDICINE

## 2021-08-10 PROCEDURE — 2580000003 HC RX 258: Performed by: STUDENT IN AN ORGANIZED HEALTH CARE EDUCATION/TRAINING PROGRAM

## 2021-08-10 PROCEDURE — 85025 COMPLETE CBC W/AUTO DIFF WBC: CPT

## 2021-08-10 PROCEDURE — 6360000004 HC RX CONTRAST MEDICATION: Performed by: INTERNAL MEDICINE

## 2021-08-10 RX ADMIN — MORPHINE SULFATE 1 MG: 2 INJECTION, SOLUTION INTRAMUSCULAR; INTRAVENOUS at 14:05

## 2021-08-10 RX ADMIN — MORPHINE SULFATE 1 MG: 2 INJECTION, SOLUTION INTRAMUSCULAR; INTRAVENOUS at 00:16

## 2021-08-10 RX ADMIN — CARBAMAZEPINE 200 MG: 200 TABLET ORAL at 08:44

## 2021-08-10 RX ADMIN — PIPERACILLIN AND TAZOBACTAM 3375 MG: 3; .375 INJECTION, POWDER, FOR SOLUTION INTRAVENOUS at 07:05

## 2021-08-10 RX ADMIN — TOPIRAMATE 50 MG: 50 TABLET, FILM COATED ORAL at 08:45

## 2021-08-10 RX ADMIN — PREGABALIN 200 MG: 100 CAPSULE ORAL at 08:44

## 2021-08-10 RX ADMIN — BUDESONIDE AND FORMOTEROL FUMARATE DIHYDRATE 2 PUFF: 160; 4.5 AEROSOL RESPIRATORY (INHALATION) at 09:26

## 2021-08-10 RX ADMIN — TOPIRAMATE 50 MG: 50 TABLET, FILM COATED ORAL at 20:18

## 2021-08-10 RX ADMIN — CARBAMAZEPINE 200 MG: 200 TABLET ORAL at 14:04

## 2021-08-10 RX ADMIN — AMLODIPINE BESYLATE 5 MG: 5 TABLET ORAL at 08:44

## 2021-08-10 RX ADMIN — PIPERACILLIN AND TAZOBACTAM 3375 MG: 3; .375 INJECTION, POWDER, FOR SOLUTION INTRAVENOUS at 00:15

## 2021-08-10 RX ADMIN — SODIUM CHLORIDE, POTASSIUM CHLORIDE, SODIUM LACTATE AND CALCIUM CHLORIDE: 600; 310; 30; 20 INJECTION, SOLUTION INTRAVENOUS at 07:05

## 2021-08-10 RX ADMIN — SODIUM CHLORIDE, PRESERVATIVE FREE 10 ML: 5 INJECTION INTRAVENOUS at 08:41

## 2021-08-10 RX ADMIN — PIPERACILLIN AND TAZOBACTAM 3375 MG: 3; .375 INJECTION, POWDER, FOR SOLUTION INTRAVENOUS at 15:45

## 2021-08-10 RX ADMIN — FERROUS SULFATE TAB EC 325 MG (65 MG FE EQUIVALENT) 325 MG: 325 (65 FE) TABLET DELAYED RESPONSE at 08:44

## 2021-08-10 RX ADMIN — MORPHINE SULFATE 1 MG: 2 INJECTION, SOLUTION INTRAMUSCULAR; INTRAVENOUS at 08:45

## 2021-08-10 RX ADMIN — PIPERACILLIN AND TAZOBACTAM 3375 MG: 3; .375 INJECTION, POWDER, FOR SOLUTION INTRAVENOUS at 23:42

## 2021-08-10 RX ADMIN — PREGABALIN 200 MG: 100 CAPSULE ORAL at 14:05

## 2021-08-10 RX ADMIN — FOLIC ACID 1 MG: 1 TABLET ORAL at 08:44

## 2021-08-10 RX ADMIN — ENOXAPARIN SODIUM 40 MG: 40 INJECTION SUBCUTANEOUS at 08:45

## 2021-08-10 RX ADMIN — PREGABALIN 200 MG: 100 CAPSULE ORAL at 20:19

## 2021-08-10 RX ADMIN — BUSPIRONE HYDROCHLORIDE 30 MG: 15 TABLET ORAL at 17:35

## 2021-08-10 RX ADMIN — TRAZODONE HYDROCHLORIDE 50 MG: 50 TABLET ORAL at 23:42

## 2021-08-10 RX ADMIN — BACLOFEN 10 MG: 10 TABLET ORAL at 20:18

## 2021-08-10 RX ADMIN — PANTOPRAZOLE SODIUM 40 MG: 40 TABLET, DELAYED RELEASE ORAL at 07:04

## 2021-08-10 RX ADMIN — MORPHINE SULFATE 1 MG: 2 INJECTION, SOLUTION INTRAMUSCULAR; INTRAVENOUS at 20:14

## 2021-08-10 RX ADMIN — ACETAMINOPHEN 650 MG: 325 TABLET ORAL at 08:41

## 2021-08-10 RX ADMIN — Medication 100 MG: at 08:44

## 2021-08-10 RX ADMIN — CARBAMAZEPINE 200 MG: 200 TABLET ORAL at 20:18

## 2021-08-10 RX ADMIN — BACLOFEN 10 MG: 10 TABLET ORAL at 08:44

## 2021-08-10 RX ADMIN — BUSPIRONE HYDROCHLORIDE 30 MG: 15 TABLET ORAL at 08:44

## 2021-08-10 RX ADMIN — FERROUS SULFATE TAB EC 325 MG (65 MG FE EQUIVALENT) 325 MG: 325 (65 FE) TABLET DELAYED RESPONSE at 20:19

## 2021-08-10 RX ADMIN — ACETAMINOPHEN 650 MG: 325 TABLET ORAL at 14:04

## 2021-08-10 RX ADMIN — BACLOFEN 10 MG: 10 TABLET ORAL at 14:05

## 2021-08-10 RX ADMIN — SODIUM CHLORIDE, PRESERVATIVE FREE 5 ML: 5 INJECTION INTRAVENOUS at 20:21

## 2021-08-10 RX ADMIN — BUDESONIDE AND FORMOTEROL FUMARATE DIHYDRATE 2 PUFF: 160; 4.5 AEROSOL RESPIRATORY (INHALATION) at 20:19

## 2021-08-10 ASSESSMENT — ENCOUNTER SYMPTOMS
EYE ITCHING: 0
APNEA: 0
ABDOMINAL DISTENTION: 0
FACIAL SWELLING: 0
ABDOMINAL PAIN: 1
COLOR CHANGE: 0
STRIDOR: 0

## 2021-08-10 ASSESSMENT — PAIN SCALES - GENERAL
PAINLEVEL_OUTOF10: 7
PAINLEVEL_OUTOF10: 5
PAINLEVEL_OUTOF10: 6
PAINLEVEL_OUTOF10: 5
PAINLEVEL_OUTOF10: 7
PAINLEVEL_OUTOF10: 8

## 2021-08-10 ASSESSMENT — PAIN DESCRIPTION - ORIENTATION: ORIENTATION: LEFT

## 2021-08-10 ASSESSMENT — PAIN DESCRIPTION - LOCATION: LOCATION: ABDOMEN

## 2021-08-10 ASSESSMENT — PAIN DESCRIPTION - ONSET: ONSET: ON-GOING

## 2021-08-10 ASSESSMENT — PAIN DESCRIPTION - DESCRIPTORS: DESCRIPTORS: ACHING;JABBING

## 2021-08-10 ASSESSMENT — PAIN DESCRIPTION - PAIN TYPE: TYPE: CHRONIC PAIN

## 2021-08-10 ASSESSMENT — PAIN - FUNCTIONAL ASSESSMENT: PAIN_FUNCTIONAL_ASSESSMENT: ACTIVITIES ARE NOT PREVENTED

## 2021-08-10 ASSESSMENT — PAIN DESCRIPTION - PROGRESSION: CLINICAL_PROGRESSION: NOT CHANGED

## 2021-08-10 ASSESSMENT — PAIN DESCRIPTION - FREQUENCY: FREQUENCY: CONTINUOUS

## 2021-08-10 NOTE — PROGRESS NOTES
will go to the short stay unit for preparation to be discharged. Only your one designated person is allowed to come to short stay for your discharge. Instructions read to [facility].

## 2021-08-10 NOTE — PROGRESS NOTES
Occupational [address]  Occupational Therapy Not Seen Note    DATE: 8/10/2021  Name: Chris Rogers  : 1970  MRN: [id number]    Patient not available for Occupational Therapy due to:    Pt independent with functional mobility and functional tasks. Pt with no OT acute care needs at this time, will defer OT eval. Pt instructed to inform RN/MD if functional changes occur during hospitalization.        Dillon Galarza S/OT

## 2021-08-10 NOTE — PLAN OF CARE
Pt in stable condition, up ambulatory in room and walking outside to smoke several times throughout the day.  Will continue to monitor and report changes

## 2021-08-10 NOTE — CONSULTS
[facility] -   Infectious diseases evaluation  admission date 8/9/2021    reason for consultation:   pancreatitis - leukocytosis    Impression :   Current:  · bandemia  · Left colitis, diverticulosis  · Acute tail pancreatitis  · Peripancreatic fluid collection - improved, drain 7/28  · 8/9 drain plugged and increased left abd pain - admitted, unplugged and pain better  · No fever  · Left LL mucous plug and telectasis  · C/O severe LUQ pain  · Livedo reticularis over the back    Other:  ·   Discussion / summary of stay / plan of care   ·   Recommendations     · Left LL atelectasis - spirometer  · Cause of the leukocytosis unclear, might e from left FRANSISCO drainage obstruction, cant R/O drain infection as well  · keep zosyn  · WBC improved on Zosyn  · AB 7 days total ending treatment on 8/15  · Consider switching to Ciprofloxacin po upon discharge; QTC (352)  · ERCP and panc stent is still planned by GI  · AB reconsiled - ok for DC    Infection Control Recommendations   · Axis Precautions      Antimicrobial Stewardship Recommendations   · Simplification of therapy  · Targeted therapy      Coordination ofOutpatient Care:   · Estimated Length of IV antimicrobials:  · Patient will need Midline / picc Catheter Insertion:   · Patient will need SNF:  · Patient will need outpatient wound care:     History of Present Illness:   Initial history:  Luis Gentile is a 45y.o.-year-old female   Back w increased LUQ pain and drain lugged - pain better after drain opened  Fluid in bulb is old blood - not foul and no pus or stools CT AP improved collection size and pain, no fever all along and rash  Was seen for panc tail pancreatitis recently etoh relapsed and drain p;avced in the bella pancreatic cysts, and planned for ERC and panc stent outpt, did not have nat to happen, came right back.     Not septic looking - leukocytosis w LLL atelectasis    8/10  Continues to feel pain but has noted improvement since yesterday. There is still tenderness to palpation of the left upper abdomen as well as pressure in the area. Drain is continuing to drain and patient has been emptying her drains appropriately. She is interested in learning how to flush her drain herself-was told that she could speak to GI. Pt expresses concern regarding her current abx tx and its effect on her pancreas and ERCP. It was explained to her that there are no current contraindications to her abx tx and she expressed understanding. Interval changes  8/10/2021   Patient Vitals for the past 8 hrs:   BP Pulse Resp SpO2 Height   08/10/21 1400 (!) 133/95 75 14 -- --   08/10/21 1240 -- -- -- -- 5' 5\" (1.651 m)   08/10/21 1000 128/84 78 16 -- --   08/10/21 0926 -- -- 21 93 % --   08/10/21 0841 111/86 -- -- -- --       8/10  Patient appears alert and oriented with anxious affect   abd pain bettef - FRANSISCO drainage bloody and stable -  WBC nl on Zosyn   Afebrile  Bcx Neg    Summary of relevant labs:  Labs:  Wbc 24-8.8  Micro:  BC   Imaging:  CT AP 8/9  Approximately 50% decrease in size of the septated fluid collection in the   left upper quadrant, with residual components measuring up to 7 x 5 cm with   indwelling percutaneous drain. 2. New area adjacent edema within the mesentery at the splenic flexure of the   colon has developed without additional abscess or fluid collection. 3. Persistent inflammatory changes at the splenic flexure of the colon,   similar to the prior exam.  These could be reactive to the adjacent fluid   collection. 4. Left lower lobe atelectasis. Mucous plugging in the right lower lobe   segmental bronchi. 5. Subacute T12 and L5 compression fractures with 25% vertebral body height   loss. I have personally reviewed the past medical history, past surgical history, medications, social history, and family history, and I haveupdated the database accordingly.       Allergies:   Patient has no known allergies. Review of Systems:     Review of Systems   Constitutional: Positive for appetite change. Negative for activity change and diaphoresis. HENT: Negative for congestion and facial swelling. Eyes: Negative for redness and itching. Respiratory: Negative for apnea and stridor. Cardiovascular: Negative for chest pain. Gastrointestinal: Positive for abdominal pain. Negative for abdominal distention. Endocrine: Negative for heat intolerance, polydipsia and polyphagia. Genitourinary: Negative for dysuria and frequency. Musculoskeletal: Negative for arthralgias. Skin: Negative for color change. Allergic/Immunologic: Negative for immunocompromised state. Neurological: Negative for dizziness, seizures, syncope and numbness. Hematological: Negative for adenopathy. Psychiatric/Behavioral: Negative for agitation and decreased concentration. Physical Examination :       Physical Exam  Constitutional:       General: She is not in acute distress. Appearance: Normal appearance. She is normal weight. She is not ill-appearing. HENT:      Head: Normocephalic and atraumatic. Nose: Nose normal. No rhinorrhea. Mouth/Throat:      Mouth: Mucous membranes are moist.      Pharynx: No posterior oropharyngeal erythema. Eyes:      General: No scleral icterus. Conjunctiva/sclera: Conjunctivae normal.   Cardiovascular:      Rate and Rhythm: Regular rhythm. Heart sounds: Normal heart sounds. No murmur heard. No friction rub. Pulmonary:      Effort: No respiratory distress. Breath sounds: Normal breath sounds. Abdominal:      General: There is no distension. Tenderness: There is abdominal tenderness. Genitourinary:     Comments: No helms  Musculoskeletal:         General: No swelling, deformity or signs of injury. Cervical back: Neck supple. No rigidity or tenderness. Skin:     General: Skin is dry. Coloration: Skin is not jaundiced or pale. Findings: No erythema. Neurological:      General: No focal deficit present. Mental Status: She is alert and oriented to person, place, and time. Cranial Nerves: No cranial nerve deficit. Sensory: No sensory deficit. Psychiatric:         Mood and Affect: Mood normal.         Thought Content:  Thought content normal.         Past Medical History:     Past Medical History:   Diagnosis Date    Acute respiratory failure with hypoxia ([address] 10/16/2020    Alcohol withdrawal syndrome, with delirium ([address] 12/14/2019    Alcoholism ([address]     Anemia 10/2020    GI bleed    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24, the patient underwent 2 surgical resections without known recurrence 10/23/2020    Closed fracture of lateral portion of left tibial plateau 91/72/0628    COPD (chronic obstructive pulmonary disease) [facility]     CO2 retainer, on Bipap at night for this, Dr. Martha Ye ( last visit 11/20/2020 and note on chart )    Depression     bipolar, major depressive disorder, ptsd, anxiety    Dysphagia     GI bleed 10/2020    Hypertension     Memory loss     Oxygen dependent     pt stated not needed as of 12/9/2020    Pain, joint, ankle and foot     Pancreatic lesion 10/2020    Dr. Manuella Dubin working up pt    Peripheral neuropathy     Seizures ([facility]     also baseline tremors-last sz summer 2020    Tension headache     Under care of team 07/01/2021    neuro-Dr Wan[facility]-last visit june 2021    Under care of team 07/01/2021    pain management-Hamzah jimenez-last visit june 2021    Under care of team 07/01/2021    pulmonology-Dr Due[facility]as[facility]-last virtual visit feb 2021    Under care of team 07/01/2021    psych-bahnfeldt NP-telemed-last visit may 2021    Under care of team 07/01/2021    ar[address]-last visit june 2021    Wellness examination 07/01/2021    pcp-Ludivina grimes-last visit may 2021       Past Surgical  History:     Past Surgical History: Procedure Laterality Date    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    COLONOSCOPY N/A 10/22/2020    COLONOSCOPY DIAGNOSTIC performed by Dainel Dawson MD at \[facility]"" Endoscopy    [address]  7/28/2021    CT ABSCESS DRAIN SUBCUTANEOUS 7/28/2021 [facility] CT SCAN    ENDOSCOPIC ULTRASOUND (LOWER) N/A 12/9/2020    ENDOSCOPIC ULTRASOUND, UPPER WITH LINEAR SCOPE FOR BIOPSY OF MASS ON HEAD OF PANCREAS performed by Scherrie Nyhan, MD at [address] Left 7-3-13    ORIF tibial plateau    FRACTURE SURGERY Right     small finger metacarpal fracture    HAND SURGERY      pins    HYSTERECTOMY  2003    UPPER GASTROINTESTINAL ENDOSCOPY N/A 10/22/2020    EGD BIOPSY performed by Daniel Dawson MD at [facility] N/A 4/5/2021    EGD BIOPSY performed by Daniel Dawson MD at \[facility]\"" Endoscopy       Medications:      amLODIPine  5 mg Oral Daily    baclofen  10 mg Oral TID    budesonide-formoterol  2 puff Inhalation BID    busPIRone  30 mg Oral BID WC    carBAMazepine  200 mg Oral TID    ferrous sulfate  325 mg Oral BID    folic acid  1 mg Oral Daily    nicotine  1 patch Transdermal Daily    pregabalin  200 mg Oral TID    topiramate  50 mg Oral BID    vitamin B-1  100 mg Oral Daily    sodium chloride flush  5-40 mL Intravenous 2 times per day    enoxaparin  40 mg Subcutaneous Daily    pantoprazole  40 mg Oral QAM AC    piperacillin-tazobactam  3,375 mg Intravenous Q8H    traZODone  50 mg Oral Nightly       Social History:     Social History     Socioeconomic History    Marital status: Single     Spouse name: Not on file    Number of children: Not on file    Years of education: Not on file    Highest education level: Not on file   Occupational History    Not on file   Tobacco Use    Smoking status: Current Every Day Smoker     Packs/day: 1.00     Years: 30.00     Pack years: 30.00     Types: Cigarettes    Smokeless tobacco: Never Used  Tobacco comment: plans on quitting in the future    Vaping Use    Vaping Use: Never used   Substance and Sexual Activity    Alcohol use: Not Currently     Alcohol/week: 0.0 standard drinks    Drug use: Yes     Frequency: 2.0 times per week     Types: Marijuana    Sexual activity: Not Currently   Other Topics Concern    Not on file   Social History Narrative    Not on file     Social Determinants of Health     Financial Resource Strain: Medium Risk    Difficulty of Paying Living Expenses: Somewhat hard   Food Insecurity: No Food Insecurity    Worried About Running Out of Food in the Last Year: Never true    [place] of Food in the Last Year: Never true   Transportation Needs:     Lack of Transportation (Medical):      Lack of Transportation (Non-Medical):    Physical Activity:     Days of Exercise per Week:     Minutes of Exercise per Session:    Stress:     Feeling of Stress :    Social Connections:     Frequency of Communication with Friends and Family:     Frequency of Social Gatherings with Friends and Family:     Attends [employer] Services:     Active Member of Clubs or Organizations:     Attends Club or Organization Meetings:     Marital Status:    Intimate Partner Violence:     Fear of Current or Ex-Partner:     Emotionally Abused:     Physically Abused:     Sexually Abused:        Family History:     Family History   Problem Relation Age of Onset    Other Father     Cancer Maternal Grandmother     Heart Disease Paternal Grandmother     Esophageal Cancer Maternal Aunt       Medical Decision Making:   I have independently reviewed/ordered the following labs:    CBC with Differential:   Recent Labs     08/09/21  0533 08/10/21  0549   WBC 22.4* 8.8   HGB 10.5* 8.3*   HCT 33.6* 28.0*   PLT See Reflexed IPF Result 694*   LYMPHOPCT 7* 20*   MONOPCT 7 12     BMP:  Recent Labs     08/09/21  0533 08/10/21  0549    138   K 4.0 3.6*   CL 99 104   CO2 21 22   BUN 10 5*   CREATININE 0.76 0.55     Hepatic Function Panel:   Recent Labs     08/09/21  0533   PROT 7.0   LABALBU 3.0*   BILITOT 0.22*   ALKPHOS 250*   ALT 10   AST 19     No results for input(s): RPR in the last 72 hours. No results for input(s): HIV in the last 72 hours. No results for input(s): BC in the last 72 hours. Lab Results   Component Value Date    CREATININE 0.55 08/10/2021    GLUCOSE 86 08/10/2021    GLUCOSE 92 04/30/2012       Detailed results: Thank you for allowing us to participate in the care of this patient. Please call with questions. This note is created with the assistance of a speech recognition program.  While intending to generate adocument that actually reflects the content of the visit, the document can still have some errors including those of syntax and sound a like substitutions which may escape proof reading. It such instances, actual meaningcan be extrapolated by contextual diversion. Naseem Awad  Office: [phone]  Perfect serve / office [phone]      I have discussed the care of the patient, including pertinent history and exam findings,  with the resident. I have seen and examined the patient and the key elements of all parts of the encounter have been performed by me. I agree with the assessment, plan and orders as documented by the resident.     Lori Zarco, Infectious Diseases

## 2021-08-10 NOTE — CONSULTS
[address] [facility] CONSULT    Patient:   Dorie Hua   :    1970   Facility:   [address] [place] [address]   Date:    8/10/2021  Admission Dx:  Sepsis ([address] [A41.9]  Abdominal pain, unspecified abdominal location [R10.9]  Requesting physician: Rosalina Zapata MD  Reason for consult:  ERCP/pancreatic stent   CC : Abdominal pain     SUBJECTIVE     HISTORY OF PRESENT ILLNESS  This is a 45 y.o.   female who was admitted 2021 with Sepsis ([address] [A41.9]  Abdominal pain, unspecified abdominal location [R10.9]. We have been asked to see the patient in consultation by Rosalina Zapata MD for ERCP, pancreatic stent    53-year-old female with a history of COPD, gastroparesis, alcohol use disorder, chronic pancreatitis, recent CT imaging showing large fluid collection in left upper quadrant concerning for abscess  S/P percutaneous drain placement and colonic abscess and MRI/MRCP indicating fluid collection with pancreatic ductal stricture causing leakage of pancreatic. Patient was scheduled for ERCP with pancreatic duct stent placement tomorrow with Dr. Pricila Hernandez. She presented to the hospital after percutaneous drain quit draining and she noted leakage of fluid around drain. She reported associated increased left sided abdominal tenderness. CT abdomen and pelvis did show improvement in fluid collection. WBCs on admission were 22.4. Patient was seen by ID and started on Zosyn. GI asked to see patient for planned outpatient ERCP with pancreatic stent placement. Patient seen and examined. Patient reports left-sided abdominal drain continues to drain. No further leakage around insertion site. Patient reports she has been free of alcohol since last December. She continues to smoke 1/2 pack/day tobacco.  She admits to occasional marijuana use. No IV drug use. Patient reports a long history of chronic diarrhea.       Previous GI history:     EGD / patient underwent 2 surgical resections without known recurrence 10/23/2020    Closed fracture of lateral portion of left tibial plateau     COPD (chronic obstructive pulmonary disease) ([facility])     CO2 retainer, on Bipap at night for this, Dr. Thurmond Apgar ( last visit 2020 and note on chart )    Depression     bipolar, major depressive disorder, ptsd, anxiety    Dysphagia     GI bleed 10/2020    Hypertension     Memory loss     Oxygen dependent     pt stated not needed as of 2020    Pain, joint, ankle and foot     Pancreatic lesion 10/2020    Dr. Josefina Olguin working up pt    Peripheral neuropathy     Seizures ([facility] [address]     also baseline tremors-last sz summer 2020    Tension headache     Under care of team 2021    neuro-Dr Wan- alexander-last visit 2021    Under care of team 2021    pain management-Hamzah Martines-nery jimenez-last visit 2021    Under care of team 2021    pulmonology-Dr Due[facility]as-[facility]-last virtual visit 2021    Under care of team 2021    psych-bahnfeldt NP-telemed-last visit may 2021    Under care of team 2021    uv-[address]-last visit 2021    Wellness examination 2021    pcp-Ludivina grimes-last visit may 2021     Past Surgical History:   Procedure Laterality Date    BRAIN TUMOR EXCISION      astrocytoma times 2    COLONOSCOPY N/A 10/22/2020    COLONOSCOPY DIAGNOSTIC performed by Jarad Parker MD at [facility]    [address]  2021    CT ABSCESS DRAIN SUBCUTANEOUS 2021 [facility] CT SCAN    ENDOSCOPIC ULTRASOUND (LOWER) N/A 2020    ENDOSCOPIC ULTRASOUND, UPPER WITH LINEAR SCOPE FOR BIOPSY OF MASS ON HEAD OF PANCREAS performed by Guillermo Nava MD at [address] Left 7-3-13    ORIF tibial plateau    FRACTURE SURGERY Right     small finger metacarpal fracture    HAND SURGERY      pins    HYSTERECTOMY      UPPER GASTROINTESTINAL ENDOSCOPY N/A 10/22/2020    EGD BIOPSY performed by Esme Alfonso MD at [facility] N/A 4/5/2021    EGD BIOPSY performed by Esme Alfonso MD at [facility]       ALLERGIES:  No Known Allergies    HOME MEDICATIONS:  Prior to Admission medications    Medication Sig Start Date End Date Taking? Authorizing Provider   busPIRone (BUSPAR) 30 MG tablet Take 30 mg by mouth 2 times daily (with meals) 8/5/21   LOI Prado - NP   hydrOXYzine (ATARAX) 25 MG tablet Take 1 tablet by mouth 3 times daily as needed for Anxiety 8/5/21   Juan Emanuel APRN - NP   traZODone (DESYREL) 50 MG tablet Take 1 tablet by mouth nightly as needed for Sleep 8/5/21 9/4/21  LOI Prado - NP   escitalopram (LEXAPRO) 5 MG tablet Take 1 tablet by mouth daily 8/5/21   LOI Prado - NP   acamprosate (CAMPRAL) 333 MG tablet Take 2 tablets by mouth 3 times daily 8/5/21 9/4/21  Juan Emanuel APRN - NP   carBAMazepine (TEGRETOL) 200 MG tablet Take 1 tablet by mouth 3 times daily 8/3/21   [facility] APRN - NP   topiramate (TOPAMAX) 50 MG tablet Take 1 tablet by mouth 2 times daily 8/3/21   [facility] APRN - NP   spironolactone (ALDACTONE) 50 MG tablet Take 1 tablet by mouth daily 8/4/21   [facility], APRN - NP   nicotine (NICODERM CQ) 21 MG/24HR Place 1 patch onto the skin daily 8/4/21   [facility] APRN - JW   nicotine polacrilex (NICORETTE) 2 MG gum Take 1 each by mouth every hour as needed for Smoking cessation 8/3/21   [facility] APRN - NP   pregabalin (LYRICA) 200 MG capsule Take 1 capsule by mouth 3 times daily for 90 days.  7/2/21 9/30/21  MD MARY Winter-CON M20 20 MEQ extended release tablet TAKE ONE TABLET BY MOUTH DAILY 6/24/21   Ludivina Pisano MD   amLODIPine (NORVASC) 5 MG tablet TAKE ONE TABLET BY MOUTH DAILY 6/3/21   Ludivina Carina Pisano MD   baclofen (LIORESAL) 10 MG tablet Take 1 tablet by mouth 3 times daily 5/25/21   Ludivina Fredy Ariza MD   ibuprofen (ADVIL;MOTRIN) 600 MG tablet Take 1 tablet by mouth 3 times daily as needed for Pain 5/25/21   Ludivina Ariza MD   ferrous sulfate (IRON 325) 325 (65 Fe) MG tablet Take 1 tablet by mouth 2 times daily 4/22/21   Ludivina Ariza MD   rOPINIRole (REQUIP) 1 MG tablet Take 1 tablet by mouth nightly 4/1/21   Ludivina Ariza MD   budesonide-formoterol [facility]) 160-4.5 MCG/ACT AERO Inhale 2 puffs into the lungs 2 times daily 3/31/21   Ludivina Ariza MD   tiotropium (SPIRIVA RESPIMAT) 2.5 MCG/ACT AERS inhaler Inhale 2 puffs into the lungs daily 2/26/21 7/8/21  uLcille Harry MD   sodium chloride (ALTAMIST SPRAY) 0.65 % nasal spray 1 spray by Nasal route as needed for Congestion 12/28/20   Ludivina Ariza MD   ACETAMINOPHEN EXTRA STRENGTH 500 MG tablet Take 500 mg by mouth 3 times daily as needed 5/8/20   Historical Provider, MD   albuterol sulfate HFA (VENTOLIN HFA) 108 (90 Base) MCG/ACT inhaler Inhale 2 puffs into the lungs 4 times daily as needed for Wheezing 6/11/20   Ludivina Ariza MD   vitamin B-1 (THIAMINE) 100 MG tablet Take 1 tablet by mouth daily 7/12/19   Ludivina Ariza MD   vitamin D (ERGOCALCIFEROL) 51581 units CAPS capsule Take 1 capsule by mouth once a week 6/19/19   Ludivina Ariza MD   folic acid (FOLVITE) 1 MG tablet Take 1 tablet by mouth daily 6/19/19   Ludivina Ariza MD       CURRENT MEDICATIONS:  Scheduled Meds:   amLODIPine  5 mg Oral Daily    baclofen  10 mg Oral TID    budesonide-formoterol  2 puff Inhalation BID    busPIRone  30 mg Oral BID WC    carBAMazepine  200 mg Oral TID    ferrous sulfate  325 mg Oral BID    folic acid  1 mg Oral Daily    nicotine  1 patch Transdermal Daily    pregabalin  200 mg Oral TID    topiramate  50 mg Oral BID    vitamin B-1  100 mg Oral Daily    sodium chloride flush  5-40 mL Intravenous 2 times per day    enoxaparin  40 mg Subcutaneous Daily    pantoprazole  40 mg Oral QAM AC    piperacillin-tazobactam  3,375 mg Intravenous Q8H    traZODone  50 mg Oral Nightly     Continuous Infusions:   sodium chloride      lactated ringers 100 mL/hr at 08/10/21 0705     PRN Meds:albuterol sulfate HFA, hydrOXYzine, nicotine polacrilex, sodium chloride, sodium chloride flush, sodium chloride, ondansetron **OR** ondansetron, magnesium hydroxide, acetaminophen **OR** acetaminophen, potassium chloride **OR** potassium alternative oral replacement **OR** potassium chloride, magnesium sulfate, morphine    SOCIAL HISTORY:     Tobacco:   reports that she has been smoking cigarettes. She has a 30.00 pack-year smoking history. She has never used smokeless tobacco.  Alcohol:   reports previous alcohol use. Illicit drugs:  reports current drug use. Frequency: 2.00 times per week. Drug: Marijuana. FAMILY HISTORY:     Family History   Problem Relation Age of Onset    Other Father     Cancer Maternal Grandmother     Heart Disease Paternal Grandmother     Esophageal Cancer Maternal Aunt        REVIEW OF SYSTEMS:    Constitutional: No fever, no chills, no lethargy, no weakness. HEENT:  No headache, otalgia, itchy eyes, nasal discharge or sore throat. Cardiac:  No chest pain, dyspnea, orthopnea or PND. Chest:   No cough, phlegm or wheezing. Abdomen:  + abdominal pain, + nausea, no vomiting, LUQ drain leakage. Neuro:  No focal weakness, abnormal movements or seizure like activity. Skin:   No rashes, no itching. :   No hematuria, no pyuria, no dysuria, no flank pain. Extremities:  No swelling or joint pains. ROS was otherwise negative except as mentioned in the [address]      PHYSICAL EXAM:    BP (!) 133/95   Pulse 75   Temp 98.4 °F (36.9 °C) (Oral)   Resp 14   Ht 5' 5\" (1.651 m)   Wt 130 lb 8.2 oz (59.2 kg)   SpO2 93%   BMI 21.72 kg/m²     GENERAL:   Chronically ill, Well developed, Well nourished, No apparent distress  HEAD:   Normocephalic, Atraumatic  EENT:   EOMI, Sclera not icteric, Oropharynx moist   NECK:   Supple, Trachea midline  LUNGS:  CTA Bilaterally  HEART:  RRR, No murmur  ABDOMEN:   Soft, LUQ tenderness, Nondistended, BS WNL, LUQ drain with bilious/brown output   EXT:   No clubbing. No cyanosis. No edema. SKIN:   No rashes. No jaundice. No stigmata of liver disease. MUSC/SKEL:   Adequate muscle bulk for patient's age, No significant synovitis, No deformities  NEURO:  A&O x Three, CN II- XII grossly intact      LABS AND IMAGING:     CBC  Recent Labs     08/09/21  0533 08/10/21  0549   WBC 22.4* 8.8   HGB 10.5* 8.3*   HCT 33.6* 28.0*   MCV 92.6 97.9   MCH 28.9 29.0   MCHC 31.3 29.6   PLT See Reflexed IPF Result 694*       IMMATURE PLTs  Lab Results   Component Value Date    PLTFLUORE 802 08/09/2021    PLTFLUORE 129 07/10/2020       BMP  Recent Labs     08/09/21  0533 08/10/21  0549    138   K 4.0 3.6*   CL 99 104   CO2 21 22   BUN 10 5*   CREATININE 0.76 0.55   GLUCOSE 125* 86   CALCIUM 8.9 8.2*       LFTS  Recent Labs     08/09/21  0533   ALKPHOS 250*   ALT 10   AST 19   PROT 7.0   BILITOT 0.22*   LABALBU 3.0*       AMYLASE/LIPASE/AMMONIA  Recent Labs     08/09/21  0533 08/10/21  0549   LIPASE 471* 72*       PT/INR  Recent Labs     08/09/21  0533   PROTIME 10.9   INR 1.0       ANEMIA STUDIES  No results for input(s): IRON, LABIRON, TIBC, UIBC, FERRITIN, [id number], FOLATE, OCCULTBLD in the last 72 hours. IMAGING    CT ABDOMEN PELVIS W IV CONTRAST Additional Contrast? None    8/9/2021 CT OF THE ABDOMEN AND PELVIS WITH CONTRAST, 8/9/2021 6:31 am TECHNIQUE: CT of the abdomen and pelvis was performed with the administration of intravenous contrast. Multiplanar reformatted images are provided for review. Dose modulation, iterative reconstruction, and/or weight based adjustment of the mA/kV was utilized to reduce the radiation dose to as low as reasonably achievable.  COMPARISON: 07/30/2021 HISTORY: ORDERING SYSTEM PROVIDED HISTORY:  Abd pain, percutaneous pancreatic tube appears infected. TECHNOLOGIST PROVIDED HISTORY: Abd pain, percutaneous pancreatic tube appears infected. Decision Support Exception - unselect if not a suspected or confirmed emergency medical condition->Emergency Medical Condition (MA) Reason for Exam:  Abd pain Acuity:  Unknown Type of Exam:  Unknown FINDINGS: Lower Chest: Left lower lobe atelectasis. Mucous plugging in the right lower lobe segmental bronchi. Organs: The liver, spleen, adrenal glands, gallbladder and kidneys demonstrate no acute abnormality. No hydronephrosis. Diffuse pancreatic atrophy with multiple calcifications compatible with sequela of chronic pancreatitis. There is mild dilation of the main pancreatic duct in the pancreatic head and body up to 3 mm. No dilation of the common bile duct. GI/Bowel: Edema is present around the splenic flexure of the colon with mild wall thickening, similar to the prior exam. Normal appendix. No other dilated or inflamed loops of bowel. Pelvis: No pelvic mass, adenopathy, or fluid collection. Peritoneum/Retroperitoneum: In the left upper quadrant adjacent to the spleen and tail of the pancreas and hepatic flexure of the colon, there is a percutaneous drain within septated complex rim enhancing fluid collection with the largest component measuring to 7 x 5 cm in axial dimension, previously 9.5 x 7 cm. The collection has decreased in size by least 50%. The collection extends around the hepatic flexure of the colon. Edema has developed medial to the hepatic flexure of the colon which is new from the prior exam.  No additional fluid collection has developed. No free intraperitoneal air. Mesenteric vasculature is patent. The splenic vein is patent. Bones/Soft Tissues: Similar to 07/27/2021 but new compared with 02/12/2021, there are subacute compression fractures at T12 and L5 with 25% vertebral body height loss. A chronic T11 compression fracture is present.      1. Approximately 50% decrease in size of the septated fluid collection in the left upper quadrant, with residual components measuring up to 7 x 5 cm with indwelling percutaneous drain. 2. New area adjacent edema within the mesentery at the splenic flexure of the colon has developed without additional abscess or fluid collection. 3. Persistent inflammatory changes at the splenic flexure of the colon, similar to the prior exam.  These could be reactive to the adjacent fluid collection. 4. Left lower lobe atelectasis. Mucous plugging in the right lower lobe segmental bronchi. 5. Subacute T12 and L5 compression fractures with 25% vertebral body height loss. CT ABDOMEN PELVIS W IV CONTRAST Additional Contrast? Oral and Rectal    Result Date: 7/30/2021  EXAMINATION: CT OF THE ABDOMEN AND PELVIS WITH CONTRAST 7/30/2021 9:37 am TECHNIQUE: CT of the abdomen and pelvis was performed with the administration of intravenous contrast. Multiplanar reformatted images are provided for review. Dose modulation, iterative reconstruction, and/or weight based adjustment of the mA/kV was utilized to reduce the radiation dose to as low as reasonably achievable. COMPARISON: 07/27/2021, 07/21/2021 HISTORY: ORDERING SYSTEM PROVIDED HISTORY: re-eval colon, pancreas TECHNOLOGIST PROVIDED HISTORY: re-eval colon, pancreas Reason for Exam: reeval colon, pancreas trauma Acuity: Unknown Type of Exam: Unknown FINDINGS: Lower Chest: Persistent but slightly improved ground-glass opacities throughout the imaged lung bases. Slightly improved but persistent atelectatic changes in the right lower lobe with increased left lower lobe atelectasis. Superimposed consolidative changes with air bronchograms in the dependent lower lobes. New small right and slightly increased now moderate left pleural effusions. Trace pericardial effusion. Small hiatal hernia. Organs: Liver, gallbladder, and adrenal glands are unchanged.   Sequelae of chronic pancreatitis with parenchymal calcifications and pancreatic ductal dilatation as well as parenchymal atrophy, unchanged. Increased perisplenic fluid though the spleen itself appears within normal limits. Nonobstructing left nephrolithiasis. Kidneys continue to enhance normally, but there is slightly increased bilateral perinephric stranding. No hydronephrosis. GI/Bowel: The entirety of the colon is well opacified with enteric contrast. There is mild colonic distension without any focal tapering to suggest obstruction, presumably due to underlying ileus. Persistent wall thickening in the region of the splenic flexure. No extraluminal contrast extravasation. Small bowel remains normal in caliber. Pelvis: Diffuse urinary bladder wall thickening. Hysterectomy. Rectal tube in place. Peritoneum/Retroperitoneum: Increasing multiloculated fluid collections in the left upper quadrant in the subphrenic space, surrounding the spleen, and adjacent to the splenic flexure of the colon, many of which are separate from the previously described dominant collection on prior comparison. A percutaneous drainage catheter is present within the dominant collection which previously spanned approximately 9.3 cm craniocaudal and is now mildly decreased 8.4 cm craniocaudal.  I suspect that many of these other collections do not communicate with pocket of fluid containing the percutaneous drainage catheter. Mildly increased inflammatory stranding along the pericolic gutter and left pararenal space. Bones/Soft Tissues: Unchanged height loss of the multiple thoracic and L5 compression fractures. Sequelae of medication injection along the anterior abdominal wall. Mild inflammatory stranding along the left lateral flank. Increasing multiloculated rim enhancing fluid collections in the left upper quadrant.   Slightly decreased size of the dominant pocket of fluid which now contains a percutaneous drainage catheter, though there are innumerable new smaller collections and other previously present smaller collections which are increased in size. I suspect many of these do not communicate with the pocket of fluid containing the drainage catheter. Excellent opacification of the colon with enteric contrast without definitive enteric contrast in the left upper quadrant collection to confirm a colonic source. Pancreatic source is a possible alternative consideration in the appropriate clinical setting. Persistent colonic wall thickening in the region of the hepatic flexure on a background of probable ileus. Increased urinary bladder wall thickening with slightly increased perinephric stranding. Correlate with urinary labs for contributory urinary tract infection. CT ABDOMEN PELVIS W IV CONTRAST Additional Contrast? Radiologist Recommendation    Result Date: 7/27/2021  EXAMINATION: CT OF THE ABDOMEN AND PELVIS WITH CONTRAST 7/27/2021 4:12 pm TECHNIQUE: CT of the abdomen and pelvis was performed with the administration of intravenous contrast. Multiplanar reformatted images are provided for review. Dose modulation, iterative reconstruction, and/or weight based adjustment of the mA/kV was utilized to reduce the radiation dose to as low as reasonably achievable. COMPARISON: 07/21/2021 HISTORY: ORDERING SYSTEM PROVIDED HISTORY: worsening abdominal pain, sepsis. r/o abscess TECHNOLOGIST PROVIDED HISTORY: worsening abdominal pain, sepsis. r/o abscess Reason for Exam: worsening abdominal pain, sepsis. r/o abscess FINDINGS: Lower Chest: Moderate left pleural effusion. There is consolidation in both lung bases. There is also ground-glass opacity in both lung bases. Coronary artery atherosclerosis. Cardiomegaly. Organs: No acute abnormality of the liver, spleen, gallbladder, or adrenal glands. Multiple pancreatic calcifications are consistent with chronic pancreatitis. There is mild dilation of the pancreatic duct. This appearance is similar prior CT nonobstructing left nephrolithiasis.   No hydronephrosis. GI/Bowel: Stomach is partially distended. There are multiple mildly distended loops of small bowel without discrete transition point. The appendix is within normal limits. There is wall thickening and stranding adjacent to the splenic flexure of the colon. There is a new 9.3 x 7.3 x 5.4 cm complex fluid collection in the left upper quadrant. Pelvis: Bladder is partially distended with diffuse wall thickening. Prior hysterectomy. Peritoneum/Retroperitoneum: No pneumoperitoneum. Trace ascites in the left upper quadrant. Atherosclerosis in the nondilated abdominal aorta. There are enlarged mesenteric and retroperitoneal lymph nodes. Bones/Soft Tissues: No acute osseous abnormality. Compression deformity at L5 is similar prior CT. There is also height loss in multiple thoracic vertebral bodies, similar prior chest CT. Multilevel degenerative changes. 1. Large complex fluid collection in the left upper quadrant, concerning for abscess. This is likely related to the previously described colitis. 2. Consolidation and ground-glass opacity in both lower lobes with left pleural effusion, concerning for multifocal pneumonia. 3. Diffuse urinary bladder wall thickening. This could be due to underdistention, but correlation for any signs or symptoms of cystitis is recommended. 4. Chronic pancreatitis. 5. Nonobstructing left nephrolithiasis.        MRI ABDOMEN W WO CONTRAST MRCP    Result Date: 7/31/2021  EXAMINATION: MRI OF THE ABDOMEN WITH AND WITHOUT CONTRAST AND MRCP 7/31/2021 12:11 pm TECHNIQUE: Multiplanar multisequence MRI of the abdomen was performed with and without the administration of intravenous contrast. COMPARISON: Abdomen CTs dating to 02/12/2021, abdomen MRI 10/19/2020 HISTORY: ORDERING SYSTEM PROVIDED HISTORY: assess pancreatic duct for leak TECHNOLOGIST PROVIDED HISTORY: assess pancreatic duct for leak Is the patient pregnant?->No Reason for Exam: assess pancreatic duct for leak Additional signs and symptoms: pt states tones of belly pain FINDINGS: LOWER CHEST:  Trace to small right and small left pleural effusions with associated partial to complete collapse of the bilateral lower lobes. Suspected air bronchograms in the left lower lobe. Mild cardiomegaly. No pericardial effusion. LIVER:  Slightly increased signal on opposed-phase images and decreased signal on T2-weighted images, suggesting iron deposition. No findings of cirrhosis. GALLBLADDER/BILE DUCTS:  Normal gallbladder. Mild intrahepatic and extrahepatic biliary dilation with normal tapering of distal common bile duct. No obvious ductal filling defects nor strictures. PANCREAS:  Typical duct configuration. Mild dilation of the upstream pancreatic duct to the level of the neck with an apparent filling defect or stricture at the area of caliber change. Potential leak of fluid from the upstream main duct in the tail. Mild to moderate parenchymal atrophy. Suggestion of parenchymal edema in the tail. No abnormal enhancement SPLEEN:  Mildly atrophic. Increased signal on opposed-phase images and decreased signal on T2-weighted images, suggesting iron deposition. ADRENAL GLANDS:  Normal. KIDNEYS:  Normal. GI/BOWEL:  Normal course and caliber of the stomach, small bowel, colon, and rectum without obstruction. PELVIS:  Surgical absence of the urinary bladder and likely of the ovaries better assessed on CT. Normal urinary bladder. PERITONEUM/RETROPERITONEUM:  Unchanged nonenlarged to mildly enlarged left para-aortic lymph nodes. Trace simple free intraperitoneal fluid. Approximately 13.8 cm x 6.5 cm x 10.3 cm heterogeneous though predominantly T1 hyperintense and T2 hypointense lesion in the left subphrenic space near the stomach, spleen, and pancreatic tail with possible diffusion restriction and peripheral enhancement. VESSELS:  Typical arterial anatomy. Mild to moderate systemic atherosclerosis.   Normal variant circumaortic left renal vein. Patent veins. SOFT TISSUES/BONES:  Tiny fat containing umbilical hernia. Signal abnormalities related to suspected subacute compression deformities in the thoracic and lumbar spine. Multilevel degenerative disc disease. 1. Suggestion of interstitial edematous pancreatitis in the pancreatic tail, consistent with acute on chronic pancreatitis given the CT appearance. 2. Approximately 13.8 cm x 6.5 cm x 2.3 cm heterogeneous collection in the left subphrenic space. Moderate fat necrosis could have this appearance, but the affected area is outside the pancreas with no findings of necrotizing pancreatitis. Additionally, the finding was absent less than 4 weeks prior. There is suspicion for continuity of the lesion with the main pancreatic duct in the tail, although this is incompletely evaluated due to only incomplete inclusion of the affected area on the 3D MRCP sequence. This suggest the possibility of fat necrosis from leaked pancreatic fluid. Abscess is an additional consideration. 3. Mild dilation of the upstream pancreatic duct with abrupt caliber change in the pancreatic neck potentially due to a filling defect such as calcification as seen on CT or a stricture. There are no findings suggestive of an obstructing malignancy. 4. Mild intrahepatic and extrahepatic biliary dilation of indeterminate etiology with no findings of choledocholithiasis. Consider ERCP if there are clinical findings of cholestasis. 5. Left para-aortic lymphadenopathy is likely reactive. Recommend attention on follow-up imaging. CT ABSCESS DRAINAGE    Result Date: 7/28/2021  Successful percutaneous ultrasound and CT guided placement of 8 [place] left upper quadrant drainage catheter. New high attenuation material within the multi loculated left upper quadrant fluid collection suggests leakage from the adjacent abnormal colonic segment indicating micro perforation.  The above findings were called by  Chela Montgomery MD to Dr. Michele Gamble On 7/28/2021 at 12:32. IMPRESSION:     1. Chronic alcoholic pancreatitis with PD stricture and PD fluid leak  2. Left upper quadrant abscess  S/p drain placement 07/28/2021 - (pancreatic fluid collection suspected secondary to PD duct leak vs diverticulitis) admitted with blocked drain  3. Leukocytosis -improved on current antibiotics  4. Left sided abdominal pain secondary to blocked abscess drain  5. Tobacco use disorder  6. History of alcohol use disorder. Last drink 12/2020  7. Chronic diarrhea -? Pancreatic insufficiency        Old records, labs and imaging reviewed. PLAN   1. Start Creon TID. Can titrate to dietary intake  2. We will plan for ERCP with pancreatic stent placement tomorrow to help redirect pancreatic duct flow. COVID-19 vaccinated. Anticipate left-sided abdominal drain to continue for now. 3.  Tobacco abstinence strongly recommended  4. Hold Lovenox for ERCP  5. Antibiotics at the discretion of ID. This plan was formulated in collaboration with Dr. Denis Hubbard   Thank you for allowing us to participate in the care of your patient. Electronically signed by: LOI Multani CNP on 8/10/2021 at 2:13 PM     Please note that this note was generated using a voice recognition dictation software. Although every effort was made to ensure the accuracy of this automated transcription, some errors in transcription may have occurred.

## 2021-08-10 NOTE — PLAN OF CARE
Problem: Nutritional:  Goal: Nutritional status will improve  Description: Nutritional status will improve  8/10/2021 0338 by Cherry Johnson RN  Outcome: Ongoing       Problem: Physical Regulation:  Goal: Diagnostic test results will improve  Description: Diagnostic test results will improve  8/10/2021 0338 by Cherry Johnson RN  Outcome: Ongoing    Goal: Will remain free from infection  Description: Will remain free from infection  8/10/2021 0338 by Cherry Johnson RN  Outcome: Ongoing    Goal: Ability to maintain vital signs within normal range will improve  Description: Ability to maintain vital signs within normal range will improve  8/10/2021 0338 by Cherry Johnson RN  Outcome: Ongoing       Problem: Respiratory:  Goal: Ability to maintain normal respiratory secretions will improve  Description: Ability to maintain normal respiratory secretions will improve  8/10/2021 0338 by Cherry Johnson RN  Outcome: Ongoing    Problem: Skin Integrity:  Goal: Demonstration of wound healing without infection will improve  Description: Demonstration of wound healing without infection will improve  8/10/2021 0338 by Cherry Johnson RN  Outcome: Ongoing    Goal: Complications related to intravenous access or infusion will be avoided or minimized  Description: Complications related to intravenous access or infusion will be avoided or minimized  8/10/2021 0338 by Cherry Johnson RN  Outcome: Ongoing    Goal: Will show no infection signs and symptoms  Description: Will show no infection signs and symptoms  Outcome: Ongoing  Goal: Absence of new skin breakdown  Description: Absence of new skin breakdown  Outcome: Ongoing     Problem: Falls - Risk of:  Goal: Will remain free from falls  Description: Will remain free from falls  Outcome: Ongoing  Goal: Absence of physical injury  Description: Absence of physical injury  Outcome: Ongoing     Problem: Pain:  Goal: Pain level will decrease  Description: Pain level will decrease  Outcome: Ongoing  Goal: Control of acute pain  Description: Control of acute pain  Outcome: Ongoing  Goal: Control of chronic pain  Description: Control of chronic pain  Outcome: Ongoing

## 2021-08-10 NOTE — PROGRESS NOTES
Orders updated for care of the percutaneous drain site. Wound Ostomy Continence nurse (ET nurse) will no longer follow pt.    Call prn if concerns related to skin, wound, or ostomy care

## 2021-08-10 NOTE — PROGRESS NOTES
Physical Therapy        Physical Therapy Cancel Note      DATE: 8/10/2021    NAME: Daphney Alcaraz  MRN: [id number]   : 1970      Patient not seen this date for Physical Therapy due to:    Patient independent with functional mobility. Will defer PT evaluation at this time. Please reorder PT if future needs arise.         Electronically signed by Iman Green PT on 8535 at 11:20 AM

## 2021-08-10 NOTE — DISCHARGE INSTR - COC
Continuity of Care Form    Patient Name: Cheryl Guzmán   :  1970  MRN:  [id number]    Admit date:  2021  Discharge date:  ***    Code Status Order: Full Code   Advance Directives:      Admitting Physician:  No admitting provider for patient encounter. PCP: Johnathan Robles MD    Discharging Nurse: [facility] Unit/Room#: 1275/3387-56  Discharging Unit Phone Number: ***    Emergency Contact:   Extended Emergency Contact Information  Primary Emergency Contact: OdalysRebeca  Address: [address] [place], [place] [address] of [address] [facility] Phone: [phone]  Work Phone: [phone]  Mobile Phone: [phone]  Relation: Parent  Secondary Emergency Contact: Ammy Hairston  Address: [address], [address] [place] [address]  Home Phone: [phone]  Work Phone: [phone]  Mobile Phone: [phone]  Relation: Brother/Sister   needed?  No    Past Surgical History:  Past Surgical History:   Procedure Laterality Date    BRAIN TUMOR EXCISION      astrocytoma times 2    COLONOSCOPY N/A 10/22/2020    COLONOSCOPY DIAGNOSTIC performed by Isabella Bermudez MD at [facility] 182  2021    CT ABSCESS DRAIN SUBCUTANEOUS 2021 STV CT SCAN    ENDOSCOPIC ULTRASOUND (LOWER) N/A 2020    ENDOSCOPIC ULTRASOUND, UPPER WITH LINEAR SCOPE FOR BIOPSY OF MASS ON HEAD OF PANCREAS performed by Pepper Brown MD at [facility] 13 Left 7-3-13    ORIF tibial plateau    FRACTURE SURGERY Right     small finger metacarpal fracture    HAND SURGERY      pins    HYSTERECTOMY      UPPER GASTROINTESTINAL ENDOSCOPY N/A 10/22/2020    EGD BIOPSY performed by Isabella Bermudez MD at [address] [place] [address] 2021    EGD BIOPSY performed by Isabella Bermudez MD at [facility]       Immunization History:   Immunization History   Administered Date(s) Administered    COVID-19, Pfizer, PF, 30mcg/0.3mL 03/26/2021, 04/16/2021    Influenza Vaccine, unspecified formulation 10/18/2018    Influenza Virus Vaccine 11/01/2017, 10/18/2018, 09/10/2019, 10/01/2020    Influenza, Charley Jeremy, IM, (6 mo and older Fluzone, Flulaval, Fluarix and 3 yrs and older Afluria) 09/10/2019    MMR 09/27/2006    Pneumococcal Polysaccharide ([id number]) 12/28/2020    Tdap (Boostrix, Adacel) 12/28/2020       Active Problems:  Patient Active Problem List   Diagnosis Code    Acute bronchitis J20.9    Reflex sympathetic dystrophy of lower limb G90.529    Essential hypertension I10    Nicotine addiction F17.200    Psychophysiological insomnia F51.04    Anxiety U06.6    Alcoholic peripheral neuropathy (HCC) G62.1    Hypokalemia E87.6    Macrocytic anemia D53.9    Hypomagnesemia E83.42    Tobacco use Z72.0    Ambulatory dysfunction R26.2    Seizure disorder ([address] G40.909    COPD with acute exacerbation (HCC) J44.1    Paresthesia of lower extremity R20.2    Acute respiratory failure with hypoxia (HCC) J96.01    Pancreatic cyst K86.2    Recurrent major depressive disorder (HCC) F33.9    Elevated CA 19-9 level R97.8    Perineal mass, female N90.89    Esophagitis candidal  B37.81    Condyloma A63.0    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24, the patient underwent 2 surgical resections without known recurrence C71.9    Alcohol use disorder, severe, in early remission ([address] K16.99    Metabolic encephalopathy P94.80    AMAN (generalized anxiety disorder) F41.1    Major depressive disorder, recurrent severe without psychotic features ([address] F33.2    Esophageal dysphagia R13.10    Chronic peripheral neuropathic pain M79.2, G89.29    History of alcohol abuse F10.11    History of petit-mal seizures Z86.69    Intractable episodic tension-type headache G44.211    Personal history of astrocytoma Z85.841    Multilevel spine pain M54.9    Chronic pain syndrome G89.4    Marijuana abuse F12.10    Severe sepsis (HCC) A41.9, R65.20    Closed fracture of fifth thoracic vertebra ([address] S22.059A    Diverticulitis of large intestine with abscess without bleeding K57.20    Elevated brain natriuretic peptide (BNP) level R79.89    Elevated alkaline phosphatase level R74.8    Chronic pancreatitis (HCC) K86.1    Erythema ab igne L59.0    Compression fracture of thoracic vertebra (HCC) S22.000A    Compression of lumbar vertebra (HCC) X06.499M    Metabolic acidosis with increased anion gap and accumulation of organic acids E87.2    Community acquired pneumonia of left lower lobe of lung J18.9    Septicemia (HCC) A41.9    Alcohol-induced acute pancreatitis K85.20    Fluid collection of pancreas K86.89    Diverticulitis of large intestine without perforation or abscess with bleeding K57.33    Pseudocyst of pancreas K86.3    Bandemia D72.825    Sepsis (HCC) A41.9    Acute recurrent pancreatitis K85.90    Atelectasis of left lung J98.11       Isolation/Infection:   Isolation            No Isolation          Patient Infection Status       Infection Onset Added Last Indicated Last Indicated By Review Planned Expiration Resolved Resolved By    None active    Resolved    COVID-19 Rule Out 21 COVID-19, Rapid (Ordered)   21 Rule-Out Test Resulted    COVID-19 Rule Out 21 COVID-19 (Ordered)   21 Rule-Out Test Resulted    COVID-19 Rule Out 21 COVID-19, Rapid (Ordered)   21 Rule-Out Test Resulted    COVID-19 21 COVID-19   21     COVID-19 Rule Out 21 COVID-19 (Ordered)   21 Rule-Out Test Resulted    COVID-19 Rule Out 20 Covid-19 Ambulatory (Ordered)   20 Rule-Out Test Resulted    COVID-19 Rule Out 20 Covid-19 Ambulatory (Ordered)   20 Rule-Out Test Resulted    C-diff Rule Out 10/20/20 10/20/20 10/20/20 C DIFF TOXIN/ANTIGEN (Ordered)   10/21/20 Barb Kincaid RN    Order discontinued COVID-19 Rule Out 10/12/20 10/12/20 10/12/20 COVID-19 (Ordered)   10/12/20 Rule-Out Test Resulted    C-diff Rule Out 20 Gastrointestinal Panel, Molecular (Ordered)   03/15/20 Rule-Out Test Resulted            Nurse Assessment:  Last Vital Signs: /86   Pulse 71   Temp 98.4 °F (36.9 °C) (Oral)   Resp 21   Ht 5' 5\" (1.651 m)   Wt 130 lb 8.2 oz (59.2 kg)   SpO2 93%   BMI 21.72 kg/m²     Last documented pain score (0-10 scale): Pain Level: 7  Last Weight:   Wt Readings from Last 1 Encounters:   08/10/21 130 lb 8.2 oz (59.2 kg)     Mental Status:  {IP PT MENTAL STATUS:}    IV Access:  { ISMA IV ACCESS:400514054}    Nursing Mobility/ADLs:  Walking   {CHP DME FCKM:600504076}  Transfer  {CHP DME GLHX:951479232}  Bathing  {CHP DME LWXV:144880955}  Dressing  {CHP DME OETV:713500417}  Toileting  {CHP DME YMP}  Feeding  {CHP DME OJHH:118760103}  Med Admin  {CHP DME HAPS:555850902}  Med Delivery   { ISMA MED Delivery:781921915}    Wound Care Documentation and Therapy:        Elimination:  Continence: Bowel: {YES / GF:51953}  Bladder: {YES / QB:59377}  Urinary Catheter: {Urinary Catheter:712189347}   Colostomy/Ileostomy/Ileal Conduit: {YES / JI:96949}       Date of Last BM: ***    Intake/Output Summary (Last 24 hours) at 8/10/2021 1134  Last data filed at 8/10/2021 0841  Gross per 24 hour   Intake 1542 ml   Output 370 ml   Net 1172 ml     I/O last 3 completed shifts:   In: 46 [P.O.:640; I.V.:902]  Out: 395 [Urine:150; Drains:245]    Safety Concerns:     [facility] ISMA Safety Concerns:291427754}    Impairments/Disabilities:      [address] ISMA Impairments/Disabilities:761933853}    Nutrition Therapy:  Current Nutrition Therapy:   [address] [facility] Diet List:825897470}    Routes of Feeding: {CHP DME Other Feedings:138196653}  Liquids: {Slp liquid thickness:21340}  Daily Fluid Restriction: {CHP DME Yes amt example:464028133}  Last Modified Barium Swallow with Video (Video Swallowing Test): {Done Not Done [facility]:397817604}    Treatments at the Time of Hospital Discharge:   Respiratory Treatments: ***  Oxygen Therapy:  {Therapy; copd oxygen:74633}  Ventilator:    {MH CC Vent JFM}    Rehab Therapies: {THERAPEUTIC INTERVENTION:9891770315}  Weight Bearing Status/Restrictions: [address] Bryan CC Weight Bearin}  Other Medical Equipment (for information only, NOT a DME order):  {EQUIPMENT:950556380}  Other Treatments: ***    Patient's personal belongings (please select all that are sent with patient):  {CHP DME Belongings:801293959}    RN SIGNATURE:  {Esignature:681732919}    CASE MANAGEMENT/SOCIAL WORK SECTION    Inpatient Status Date: ***    Readmission Risk Assessment Score:  Readmission Risk              Risk of Unplanned Readmission:  32           Discharging to Facility/ Agency   [facility] [address]         Phone: [phone]       Fax: [phone]          Dialysis Facility (if applicable)   Name:  Address:  Dialysis Schedule:  Phone:  Fax:    / signature: Electronically signed by Kateryna Guevara RN on 8/10/21 at 11:35 AM EDT    PHYSICIAN SECTION    Prognosis: Fair    Condition at Discharge: Stable    Rehab Potential (if transferring to Rehab): Fair    Recommended Labs or Other Treatments After Discharge:      Physician Certification: I certify the above information and transfer of Lizz Givens  is necessary for the continuing treatment of the diagnosis listed and that she requires Home Care for greater 30 days.      Update Admission H&P: No change in H&P    PHYSICIAN SIGNATURE:  Electronically signed by Sha Wallace MD on 21 at 7:49 AM EDT

## 2021-08-10 NOTE — TELEPHONE ENCOUNTER
Rec'd notification from [facility] that pt is inpt at [facility]. Called pt to discuss cancelling procedure on 8/11/21 with Dr Austin Pittman due to current hospital admission. Pt states it makes sense to cancel procedure. She states she spoke with Elidia Spann NP, today at [facility].

## 2021-08-10 NOTE — CARE COORDINATION
Transitional Planning:    Received call from [facility] with [address], the pt is current with their services and they are able to resume at discharge.

## 2021-08-10 NOTE — PROGRESS NOTES
Comprehensive Nutrition Assessment    Type and Reason for Visit:  Initial, Positive Nutrition Screen (Weight Loss, Poor Appetite/Intake)    Nutrition Recommendations/Plan:   - Continue Clear Liquid Diet as tolerated  - Start clear liquid oral nutrition supplement (Ensure Clear) and provide 3x/d   - Monitor/encourage PO intakes     Nutrition Assessment:  Patient seen for report of poor appetite/intake and weight. Per EHR, patient has had a 13 lb (9.1%) weight loss since 7/24/21, which is considered significant per ASPEN malnutrition guidelines. Note weight has fluctuated from 113 to 143 lbs over the last year. Patient is currently receiving a Clear Liquid Diet. Spoke with RN who reports that patient is wanting beef broth. Labs reviewed include hypokalemia. Malnutrition Assessment:  Malnutrition Status:  Insufficient data    Context:  Acute Illness     Findings of the 6 clinical characteristics of malnutrition:  Energy Intake:  Mild decrease in energy intake   Weight Loss:  Unable to assess (Weight flux x one year)     Body Fat Loss:  Unable to assess     Muscle Mass Loss:  Unable to assess    Fluid Accumulation:  No significant fluid accumulation     Strength:  Not Performed    Estimated Daily Nutrient Needs:  Energy (kcal):  4683-0182 kcal/d (27-30 kcal/kg); Weight Used for Energy Requirements:  Current (59.2 kg)     Protein (g):  70-80 g protein/d (1.2-1.4 g/kg); Weight Used for Protein Requirements:  Current (59.2 kg)        Fluid (ml/day):  7171-9558 mL fluid/d or per MD; Method Used for Fluid Requirements:  ml/Kg (25-30 mL)      Nutrition Related Findings:  Labs: K 3.6 (L). Meds reviewed. Last BM 8/9 (diarrhea). Wounds:  None       Current Nutrition Therapies:    ADULT DIET;  Clear Liquid  Adult Oral Nutrition Supplement; Clear Liquid Oral Supplement    Anthropometric Measures:  · Height: 5' 5\" (165.1 cm)  · Current Body Weight: 130 lb 8.2 oz (59.2 kg)   · Admission Body Weight: 128 lb 8.5 oz (58.3 kg)    · Usual Body Weight: 143 lb 4.8 oz (65 kg)     · Ideal Body Weight: 125 lbs; % Ideal Body Weight 104.4 %   · BMI: 21.7  · Adjusted Body Weight:  No Adjustment   · BMI Categories: Normal Weight (BMI 18.5-24. 9)       Nutrition Diagnosis:   · Unintended weight loss related to inadequate protein-energy intake as evidenced by poor intake prior to admission, intake 26-50%, intake 51-75%    Nutrition Interventions:   Food and/or Nutrient Delivery:  Continue Current Diet, Start Oral Nutrition Supplement  Nutrition Education/Counseling:  No recommendation at this time   Coordination of Nutrition Care:  Continue to monitor while inpatient    Goals:  PO intake to meet >75% of estimated nutrition needs       Nutrition Monitoring and Evaluation:   Behavioral-Environmental Outcomes:  None Identified   Food/Nutrient Intake Outcomes:  Diet Advancement/Tolerance, Food and Nutrient Intake, Supplement Intake  Physical Signs/Symptoms Outcomes:  Biochemical Data, Diarrhea, GI Status, Fluid Status or Edema, Nutrition Focused Physical Findings, Skin, Weight     Discharge Planning:     Too soon to determine     Electronically signed by Monica Hollis RD, LD on 8/10/21 at 12:56 PM EDT    Contact: [phone]

## 2021-08-10 NOTE — PROGRESS NOTES
Oral Daily    nicotine  1 patch Transdermal Daily    pregabalin  200 mg Oral TID    topiramate  50 mg Oral BID    vitamin B-1  100 mg Oral Daily    sodium chloride flush  5-40 mL Intravenous 2 times per day    enoxaparin  40 mg Subcutaneous Daily    pantoprazole  40 mg Oral QAM AC    piperacillin-tazobactam  3,375 mg Intravenous Q8H    traZODone  50 mg Oral Nightly     Continuous Infusions:    sodium chloride      lactated ringers 100 mL/hr at 08/10/21 0705     PRN Meds: albuterol sulfate HFA, hydrOXYzine, nicotine polacrilex, sodium chloride, sodium chloride flush, sodium chloride, ondansetron **OR** ondansetron, magnesium hydroxide, acetaminophen **OR** acetaminophen, potassium chloride **OR** potassium alternative oral replacement **OR** potassium chloride, magnesium sulfate, morphine    Data:     Past Medical History:   has a past medical history of Acute respiratory failure with hypoxia ([address] Alcohol withdrawal syndrome, with delirium ([address] Alcoholism ([address] Anemia, 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 Social History:   reports that she has been smoking cigarettes. She has a 30.00 pack-year smoking history. She has never used smokeless tobacco. She reports previous alcohol use. She reports current drug use. Frequency: 2.00 times per week. Drug: Marijuana.      Family History:   Family History   Problem Relation Age of Onset    Other Father     Cancer Maternal Grandmother     Heart Disease Paternal Grandmother  Esophageal Cancer Maternal Aunt        Vitals:  /86   Pulse 71   Temp 98.4 °F (36.9 °C) (Oral)   Resp 21   Ht 5' 5\" (1.651 m)   Wt 130 lb 8.2 oz (59.2 kg)   SpO2 93%   BMI 21.72 kg/m²   Temp (24hrs), Av.4 °F (36.9 °C), Min:98.2 °F (36.8 °C), Max:98.6 °F (37 °C)    No results for input(s): POCGLU in the last 72 hours. I/O (24Hr): Intake/Output Summary (Last 24 hours) at 8/10/2021 1004  Last data filed at 8/10/2021 0841  Gross per 24 hour   Intake 1542 ml   Output 370 ml   Net 1172 ml       Labs:  Hematology:  Recent Labs     2133 08/10/21  0549   WBC 22.4* 8.8   RBC 3.63* 2.86*   HGB 10.5* 8.3*   HCT 33.6* 28.0*   MCV 92.6 97.9   MCH 28.9 29.0   MCHC 31.3 29.6   RDW 14.4 14.6*   PLT See Reflexed IPF Result 694*   MPV NOT REPORTED 9.6   INR 1.0  --      Chemistry:  Recent Labs     21  0533 08/10/21  0549    138   K 4.0 3.6*   CL 99 104   CO2 21 22   GLUCOSE 125* 86   BUN 10 5*   CREATININE 0.76 0.55   ANIONGAP 16 12   LABGLOM >60 >60   GFRAA >60 >60   CALCIUM 8.9 8.2*   TROPHS 13  --      Recent Labs     21  0533 08/10/21  0549   PROT 7.0  --    LABALBU 3.0*  --    AST 19  --    ALT 10  --    ALKPHOS 250*  --    BILITOT 0.22*  --    LIPASE 471* 72*     ABG:  Lab Results   Component Value Date    POCPH 7.467 2021    POCPCO2 44.7 2021    POCPO2 71.9 2021    POCHCO3 32.3 2021    NBEA NOT REPORTED 2021    PBEA 8 2021    SQS2HKR NOT REPORTED 2021    XBGY8LZQ 95 2021    FIO2 INFORMATION NOT PROVIDED 2021     Lab Results   Component Value Date/Time    SPECIAL LEFT FA 10 ML 2021 05:15 AM     Lab Results   Component Value Date/Time    CULTURE NO GROWTH 18 HOURS 2021 05:15 AM       Radiology:  CT ABDOMEN PELVIS W IV CONTRAST Additional Contrast? None    Result Date: 2021  1.  Approximately 50% decrease in size of the septated fluid collection in the left upper quadrant, with residual components measuring up to 7 x 5 cm with indwelling percutaneous drain. 2. New area adjacent edema within the mesentery at the splenic flexure of the colon has developed without additional abscess or fluid collection. 3. Persistent inflammatory changes at the splenic flexure of the colon, similar to the prior exam.  These could be reactive to the adjacent fluid collection. 4. Left lower lobe atelectasis. Mucous plugging in the right lower lobe segmental bronchi. 5. Subacute T12 and L5 compression fractures with 25% vertebral body height loss. XR CHEST PORTABLE    Result Date: 8/9/2021  1. Interval decreased bibasilar ill-defined consolidation suggesting pneumonia. 2. Interval decreased volume of mild bilateral pleural effusions. Physical Examination:        General appearance:  alert, cooperative and no distress  Mental Status:  oriented to person, place and time and normal affect  Lungs:  clear to auscultation bilaterally, normal effort  Heart:  regular rate and rhythm, no murmur  Abdomen:  soft, TTP, nondistended, normal bowel sounds, drain in place  Extremities:  no edema, redness, tenderness in the calves  Skin:  no gross lesions, rashes, induration    Assessment:        Hospital Problems         Last Modified POA    * (Principal) Acute recurrent pancreatitis 8/9/2021 Yes    Essential hypertension 8/9/2021 Yes    Nicotine addiction 8/9/2021 Yes    Anxiety 8/9/2021 Yes    Tobacco use 8/9/2021 Yes    Seizure disorder ([address] 8/9/2021 Yes    Acute respiratory failure with hypoxia ([address] 8/9/2021 Yes    Pancreatic cyst 8/9/2021 Yes    Recurrent major depressive disorder ([address] 8/9/2021 Yes    AMAN (generalized anxiety disorder) 8/9/2021 Yes    Chronic pancreatitis ([address] 8/9/2021 Yes    Pseudocyst of pancreas 8/9/2021 Yes    Sepsis ([address] 8/9/2021 Yes    Atelectasis of left lung 8/9/2021 Yes          Plan:        1. Recurrent pancreatitis  2.  Peripancreatic fluid collection   - ID on board   - pancreatic drain unplugged yesterday - conitnue zosyn per ID recs   - GI was planning on ERCP and stent tomorrow per patient.   - GI consulted due to above   - continue IVF   - continue CLD   - lipase improved     3. Hx of smoking    4. COPD  - Continue inhalers, encourage smoking cessation.   - Keep oxygen saturation above 88%    5. Seizure history     - continue tegretol 200 mg TID  - seizure precautions     6. Anxiety  - Buspar 30 mg BID    7. GERD  - continue protonix    8. EtOH abuse history  - Has not had alcohol since discharge per patient. - Continue multivitamin, thiamin, folic acid    9. Iron deficinecy anemia  - Continue Ferrous sulfate    10.  Myalgia  - continue baclofen    Malathi Connors MD  8/10/2021  10:04 AM

## 2021-08-11 ENCOUNTER — ANESTHESIA (OUTPATIENT)
Dept: ENDOSCOPY | Age: 51
DRG: 720 | End: 2021-08-11
Payer: MEDICARE

## 2021-08-11 ENCOUNTER — APPOINTMENT (OUTPATIENT)
Dept: GENERAL RADIOLOGY | Age: 51
DRG: 720 | End: 2021-08-11
Payer: MEDICARE

## 2021-08-11 ENCOUNTER — ANESTHESIA EVENT (OUTPATIENT)
Dept: ENDOSCOPY | Age: 51
DRG: 720 | End: 2021-08-11
Payer: MEDICARE

## 2021-08-11 VITALS — DIASTOLIC BLOOD PRESSURE: 76 MMHG | OXYGEN SATURATION: 100 % | TEMPERATURE: 96.2 F | SYSTOLIC BLOOD PRESSURE: 104 MMHG

## 2021-08-11 LAB
ABSOLUTE EOS #: 0.28 K/UL (ref 0–0.44)
ABSOLUTE IMMATURE GRANULOCYTE: 0.08 K/UL (ref 0–0.3)
ABSOLUTE LYMPH #: 1.83 K/UL (ref 1.1–3.7)
ABSOLUTE MONO #: 1.06 K/UL (ref 0.1–1.2)
ANION GAP SERPL CALCULATED.3IONS-SCNC: 16 MMOL/L (ref 9–17)
BASOPHILS # BLD: 0 % (ref 0–2)
BASOPHILS ABSOLUTE: 0.03 K/UL (ref 0–0.2)
BUN BLDV-MCNC: 4 MG/DL (ref 6–20)
BUN/CREAT BLD: ABNORMAL (ref 9–20)
CALCIUM SERPL-MCNC: 8.3 MG/DL (ref 8.6–10.4)
CHLORIDE BLD-SCNC: 101 MMOL/L (ref 98–107)
CO2: 20 MMOL/L (ref 20–31)
CREAT SERPL-MCNC: 0.63 MG/DL (ref 0.5–0.9)
DIFFERENTIAL TYPE: ABNORMAL
EOSINOPHILS RELATIVE PERCENT: 3 % (ref 1–4)
GFR AFRICAN AMERICAN: >60 ML/MIN
GFR NON-AFRICAN AMERICAN: >60 ML/MIN
GFR SERPL CREATININE-BSD FRML MDRD: ABNORMAL ML/MIN/{1.73_M2}
GFR SERPL CREATININE-BSD FRML MDRD: ABNORMAL ML/MIN/{1.73_M2}
GLUCOSE BLD-MCNC: 85 MG/DL (ref 70–99)
HCT VFR BLD CALC: 27.3 % (ref 36.3–47.1)
HEMOGLOBIN: 8.3 G/DL (ref 11.9–15.1)
IMMATURE GRANULOCYTES: 1 %
LYMPHOCYTES # BLD: 18 % (ref 24–43)
MAGNESIUM: 1.5 MG/DL (ref 1.6–2.6)
MCH RBC QN AUTO: 29.3 PG (ref 25.2–33.5)
MCHC RBC AUTO-ENTMCNC: 30.4 G/DL (ref 28.4–34.8)
MCV RBC AUTO: 96.5 FL (ref 82.6–102.9)
MONOCYTES # BLD: 10 % (ref 3–12)
NRBC AUTOMATED: 0 PER 100 WBC
PDW BLD-RTO: 14.5 % (ref 11.8–14.4)
PLATELET # BLD: 757 K/UL (ref 138–453)
PLATELET ESTIMATE: ABNORMAL
PMV BLD AUTO: 9.9 FL (ref 8.1–13.5)
POTASSIUM SERPL-SCNC: 3.5 MMOL/L (ref 3.7–5.3)
RBC # BLD: 2.83 M/UL (ref 3.95–5.11)
RBC # BLD: ABNORMAL 10*6/UL
SEG NEUTROPHILS: 68 % (ref 36–65)
SEGMENTED NEUTROPHILS ABSOLUTE COUNT: 7.08 K/UL (ref 1.5–8.1)
SODIUM BLD-SCNC: 137 MMOL/L (ref 135–144)
WBC # BLD: 10.4 K/UL (ref 3.5–11.3)
WBC # BLD: ABNORMAL 10*3/UL

## 2021-08-11 PROCEDURE — 94664 DEMO&/EVAL PT USE INHALER: CPT

## 2021-08-11 PROCEDURE — 6360000002 HC RX W HCPCS: Performed by: STUDENT IN AN ORGANIZED HEALTH CARE EDUCATION/TRAINING PROGRAM

## 2021-08-11 PROCEDURE — 2580000003 HC RX 258: Performed by: SPECIALIST

## 2021-08-11 PROCEDURE — 2709999900 HC NON-CHARGEABLE SUPPLY: Performed by: INTERNAL MEDICINE

## 2021-08-11 PROCEDURE — 6370000000 HC RX 637 (ALT 250 FOR IP): Performed by: STUDENT IN AN ORGANIZED HEALTH CARE EDUCATION/TRAINING PROGRAM

## 2021-08-11 PROCEDURE — 80048 BASIC METABOLIC PNL TOTAL CA: CPT

## 2021-08-11 PROCEDURE — C1769 GUIDE WIRE: HCPCS | Performed by: INTERNAL MEDICINE

## 2021-08-11 PROCEDURE — 6370000000 HC RX 637 (ALT 250 FOR IP): Performed by: INTERNAL MEDICINE

## 2021-08-11 PROCEDURE — 99232 SBSQ HOSP IP/OBS MODERATE 35: CPT | Performed by: INTERNAL MEDICINE

## 2021-08-11 PROCEDURE — 3700000001 HC ADD 15 MINUTES (ANESTHESIA): Performed by: INTERNAL MEDICINE

## 2021-08-11 PROCEDURE — 94640 AIRWAY INHALATION TREATMENT: CPT

## 2021-08-11 PROCEDURE — 99232 SBSQ HOSP IP/OBS MODERATE 35: CPT | Performed by: FAMILY MEDICINE

## 2021-08-11 PROCEDURE — 94761 N-INVAS EAR/PLS OXIMETRY MLT: CPT

## 2021-08-11 PROCEDURE — 3209999900 FLUORO FOR SURGICAL PROCEDURES

## 2021-08-11 PROCEDURE — 2700000000 HC OXYGEN THERAPY PER DAY

## 2021-08-11 PROCEDURE — 83735 ASSAY OF MAGNESIUM: CPT

## 2021-08-11 PROCEDURE — 43274 ERCP DUCT STENT PLACEMENT: CPT | Performed by: INTERNAL MEDICINE

## 2021-08-11 PROCEDURE — 7100000001 HC PACU RECOVERY - ADDTL 15 MIN: Performed by: INTERNAL MEDICINE

## 2021-08-11 PROCEDURE — 2060000000 HC ICU INTERMEDIATE R&B

## 2021-08-11 PROCEDURE — 3609014900 HC ERCP W/SPHINCTEROTOMY &/OR PAPILLOTOMY: Performed by: INTERNAL MEDICINE

## 2021-08-11 PROCEDURE — 2580000003 HC RX 258: Performed by: STUDENT IN AN ORGANIZED HEALTH CARE EDUCATION/TRAINING PROGRAM

## 2021-08-11 PROCEDURE — 36415 COLL VENOUS BLD VENIPUNCTURE: CPT

## 2021-08-11 PROCEDURE — 6360000002 HC RX W HCPCS: Performed by: INTERNAL MEDICINE

## 2021-08-11 PROCEDURE — C2617 STENT, NON-COR, TEM W/O DEL: HCPCS | Performed by: INTERNAL MEDICINE

## 2021-08-11 PROCEDURE — 6360000002 HC RX W HCPCS: Performed by: SPECIALIST

## 2021-08-11 PROCEDURE — 2580000003 HC RX 258: Performed by: INTERNAL MEDICINE

## 2021-08-11 PROCEDURE — 7100000000 HC PACU RECOVERY - FIRST 15 MIN: Performed by: INTERNAL MEDICINE

## 2021-08-11 PROCEDURE — 2500000003 HC RX 250 WO HCPCS: Performed by: SPECIALIST

## 2021-08-11 PROCEDURE — 85025 COMPLETE CBC W/AUTO DIFF WBC: CPT

## 2021-08-11 PROCEDURE — 3700000000 HC ANESTHESIA ATTENDED CARE: Performed by: INTERNAL MEDICINE

## 2021-08-11 PROCEDURE — 3609015100 HC ERCP STENT PLACEMENT BILIARY/PANCREATIC DUCT: Performed by: INTERNAL MEDICINE

## 2021-08-11 DEVICE — PANCREATIC STENT
Type: IMPLANTABLE DEVICE | Site: PANCREAS | Status: NON-FUNCTIONAL
Brand: ADVANIX™ PANCREATIC STENT
Removed: 2022-02-09

## 2021-08-11 RX ORDER — LIDOCAINE HYDROCHLORIDE 10 MG/ML
INJECTION, SOLUTION EPIDURAL; INFILTRATION; INTRACAUDAL; PERINEURAL PRN
Status: DISCONTINUED | OUTPATIENT
Start: 2021-08-11 | End: 2021-08-11 | Stop reason: SDUPTHER

## 2021-08-11 RX ORDER — GLYCOPYRROLATE 1 MG/5 ML
SYRINGE (ML) INTRAVENOUS PRN
Status: DISCONTINUED | OUTPATIENT
Start: 2021-08-11 | End: 2021-08-11 | Stop reason: SDUPTHER

## 2021-08-11 RX ORDER — FENTANYL CITRATE 50 UG/ML
INJECTION, SOLUTION INTRAMUSCULAR; INTRAVENOUS PRN
Status: DISCONTINUED | OUTPATIENT
Start: 2021-08-11 | End: 2021-08-11 | Stop reason: SDUPTHER

## 2021-08-11 RX ORDER — NEOSTIGMINE METHYLSULFATE 5 MG/5 ML
SYRINGE (ML) INTRAVENOUS PRN
Status: DISCONTINUED | OUTPATIENT
Start: 2021-08-11 | End: 2021-08-11 | Stop reason: SDUPTHER

## 2021-08-11 RX ORDER — CIPROFLOXACIN 500 MG/1
500 TABLET, FILM COATED ORAL 2 TIMES DAILY
Qty: 8 TABLET | Refills: 0 | Status: SHIPPED | OUTPATIENT
Start: 2021-08-11 | End: 2021-08-15

## 2021-08-11 RX ORDER — SODIUM CHLORIDE, SODIUM LACTATE, POTASSIUM CHLORIDE, CALCIUM CHLORIDE 600; 310; 30; 20 MG/100ML; MG/100ML; MG/100ML; MG/100ML
INJECTION, SOLUTION INTRAVENOUS CONTINUOUS PRN
Status: DISCONTINUED | OUTPATIENT
Start: 2021-08-11 | End: 2021-08-11 | Stop reason: SDUPTHER

## 2021-08-11 RX ORDER — METRONIDAZOLE 500 MG/1
500 TABLET ORAL 3 TIMES DAILY
Qty: 12 TABLET | Refills: 0 | Status: SHIPPED | OUTPATIENT
Start: 2021-08-11 | End: 2021-08-15

## 2021-08-11 RX ORDER — ONDANSETRON 2 MG/ML
INJECTION INTRAMUSCULAR; INTRAVENOUS PRN
Status: DISCONTINUED | OUTPATIENT
Start: 2021-08-11 | End: 2021-08-11 | Stop reason: SDUPTHER

## 2021-08-11 RX ORDER — ROCURONIUM BROMIDE 10 MG/ML
INJECTION, SOLUTION INTRAVENOUS PRN
Status: DISCONTINUED | OUTPATIENT
Start: 2021-08-11 | End: 2021-08-11 | Stop reason: SDUPTHER

## 2021-08-11 RX ORDER — PHENYLEPHRINE HCL IN 0.9% NACL 1 MG/10 ML
SYRINGE (ML) INTRAVENOUS PRN
Status: DISCONTINUED | OUTPATIENT
Start: 2021-08-11 | End: 2021-08-11 | Stop reason: SDUPTHER

## 2021-08-11 RX ORDER — PROPOFOL 10 MG/ML
INJECTION, EMULSION INTRAVENOUS PRN
Status: DISCONTINUED | OUTPATIENT
Start: 2021-08-11 | End: 2021-08-11 | Stop reason: SDUPTHER

## 2021-08-11 RX ADMIN — CARBAMAZEPINE 200 MG: 200 TABLET ORAL at 21:09

## 2021-08-11 RX ADMIN — FERROUS SULFATE TAB EC 325 MG (65 MG FE EQUIVALENT) 325 MG: 325 (65 FE) TABLET DELAYED RESPONSE at 21:09

## 2021-08-11 RX ADMIN — Medication 3 MG: at 14:57

## 2021-08-11 RX ADMIN — SODIUM CHLORIDE, PRESERVATIVE FREE 10 ML: 5 INJECTION INTRAVENOUS at 19:53

## 2021-08-11 RX ADMIN — PROPOFOL INJECTABLE EMULSION 200 MG: 10 INJECTION, EMULSION INTRAVENOUS at 14:13

## 2021-08-11 RX ADMIN — BUSPIRONE HYDROCHLORIDE 30 MG: 15 TABLET ORAL at 16:08

## 2021-08-11 RX ADMIN — AMLODIPINE BESYLATE 5 MG: 5 TABLET ORAL at 08:00

## 2021-08-11 RX ADMIN — PREGABALIN 200 MG: 100 CAPSULE ORAL at 16:03

## 2021-08-11 RX ADMIN — BUDESONIDE AND FORMOTEROL FUMARATE DIHYDRATE 2 PUFF: 160; 4.5 AEROSOL RESPIRATORY (INHALATION) at 07:46

## 2021-08-11 RX ADMIN — BACLOFEN 10 MG: 10 TABLET ORAL at 21:09

## 2021-08-11 RX ADMIN — LIDOCAINE HYDROCHLORIDE 50 MG: 10 INJECTION, SOLUTION EPIDURAL; INFILTRATION; INTRACAUDAL; PERINEURAL at 14:13

## 2021-08-11 RX ADMIN — Medication 0.6 MG: at 14:57

## 2021-08-11 RX ADMIN — MORPHINE SULFATE 1 MG: 2 INJECTION, SOLUTION INTRAMUSCULAR; INTRAVENOUS at 19:53

## 2021-08-11 RX ADMIN — FENTANYL CITRATE 50 MCG: 50 INJECTION, SOLUTION INTRAMUSCULAR; INTRAVENOUS at 15:03

## 2021-08-11 RX ADMIN — ACETAMINOPHEN 650 MG: 325 TABLET ORAL at 09:39

## 2021-08-11 RX ADMIN — PREGABALIN 200 MG: 100 CAPSULE ORAL at 21:09

## 2021-08-11 RX ADMIN — PANCRELIPASE 36000 UNITS: 24000; 76000; 120000 CAPSULE, DELAYED RELEASE PELLETS ORAL at 16:07

## 2021-08-11 RX ADMIN — CARBAMAZEPINE 200 MG: 200 TABLET ORAL at 16:03

## 2021-08-11 RX ADMIN — PIPERACILLIN AND TAZOBACTAM 3375 MG: 3; .375 INJECTION, POWDER, FOR SOLUTION INTRAVENOUS at 17:46

## 2021-08-11 RX ADMIN — Medication 100 MCG: at 14:24

## 2021-08-11 RX ADMIN — SODIUM CHLORIDE, POTASSIUM CHLORIDE, SODIUM LACTATE AND CALCIUM CHLORIDE: 600; 310; 30; 20 INJECTION, SOLUTION INTRAVENOUS at 14:07

## 2021-08-11 RX ADMIN — TOPIRAMATE 50 MG: 50 TABLET, FILM COATED ORAL at 21:09

## 2021-08-11 RX ADMIN — FENTANYL CITRATE 50 MCG: 50 INJECTION, SOLUTION INTRAMUSCULAR; INTRAVENOUS at 14:13

## 2021-08-11 RX ADMIN — BACLOFEN 10 MG: 10 TABLET ORAL at 16:03

## 2021-08-11 RX ADMIN — FERROUS SULFATE TAB EC 325 MG (65 MG FE EQUIVALENT) 325 MG: 325 (65 FE) TABLET DELAYED RESPONSE at 08:00

## 2021-08-11 RX ADMIN — FOLIC ACID 1 MG: 1 TABLET ORAL at 07:57

## 2021-08-11 RX ADMIN — Medication 100 MG: at 08:00

## 2021-08-11 RX ADMIN — PIPERACILLIN AND TAZOBACTAM 3375 MG: 3; .375 INJECTION, POWDER, FOR SOLUTION INTRAVENOUS at 07:52

## 2021-08-11 RX ADMIN — MORPHINE SULFATE 1 MG: 2 INJECTION, SOLUTION INTRAMUSCULAR; INTRAVENOUS at 09:32

## 2021-08-11 RX ADMIN — BUSPIRONE HYDROCHLORIDE 30 MG: 15 TABLET ORAL at 07:53

## 2021-08-11 RX ADMIN — BACLOFEN 10 MG: 10 TABLET ORAL at 08:00

## 2021-08-11 RX ADMIN — CARBAMAZEPINE 200 MG: 200 TABLET ORAL at 08:00

## 2021-08-11 RX ADMIN — MORPHINE SULFATE 1 MG: 2 INJECTION, SOLUTION INTRAMUSCULAR; INTRAVENOUS at 03:33

## 2021-08-11 RX ADMIN — SODIUM CHLORIDE, PRESERVATIVE FREE 10 ML: 5 INJECTION INTRAVENOUS at 07:58

## 2021-08-11 RX ADMIN — TOPIRAMATE 50 MG: 50 TABLET, FILM COATED ORAL at 08:00

## 2021-08-11 RX ADMIN — PANTOPRAZOLE SODIUM 40 MG: 40 TABLET, DELAYED RELEASE ORAL at 07:52

## 2021-08-11 RX ADMIN — ROCURONIUM BROMIDE 50 MG: 10 INJECTION INTRAVENOUS at 14:13

## 2021-08-11 RX ADMIN — Medication 100 MCG: at 14:40

## 2021-08-11 RX ADMIN — PREGABALIN 200 MG: 100 CAPSULE ORAL at 08:00

## 2021-08-11 RX ADMIN — TRAZODONE HYDROCHLORIDE 50 MG: 50 TABLET ORAL at 22:00

## 2021-08-11 RX ADMIN — ONDANSETRON 4 MG: 2 INJECTION, SOLUTION INTRAMUSCULAR; INTRAVENOUS at 14:46

## 2021-08-11 ASSESSMENT — PULMONARY FUNCTION TESTS
PIF_VALUE: 9
PIF_VALUE: 22
PIF_VALUE: 21
PIF_VALUE: 2
PIF_VALUE: 22
PIF_VALUE: 21
PIF_VALUE: 24
PIF_VALUE: 21
PIF_VALUE: 36
PIF_VALUE: 23
PIF_VALUE: 21
PIF_VALUE: 23
PIF_VALUE: 23
PIF_VALUE: 15
PIF_VALUE: 20
PIF_VALUE: 20
PIF_VALUE: 24
PIF_VALUE: 24
PIF_VALUE: 15
PIF_VALUE: 21
PIF_VALUE: 8
PIF_VALUE: 27
PIF_VALUE: 22
PIF_VALUE: 22
PIF_VALUE: 4
PIF_VALUE: 24
PIF_VALUE: 23
PIF_VALUE: 21
PIF_VALUE: 23
PIF_VALUE: 2
PIF_VALUE: 4
PIF_VALUE: 21
PIF_VALUE: 24
PIF_VALUE: 2
PIF_VALUE: 21
PIF_VALUE: 7
PIF_VALUE: 2
PIF_VALUE: 37
PIF_VALUE: 21
PIF_VALUE: 12
PIF_VALUE: 21
PIF_VALUE: 23
PIF_VALUE: 20
PIF_VALUE: 21
PIF_VALUE: 23
PIF_VALUE: 21
PIF_VALUE: 5
PIF_VALUE: 26
PIF_VALUE: 14
PIF_VALUE: 24
PIF_VALUE: 20
PIF_VALUE: 31

## 2021-08-11 ASSESSMENT — PAIN DESCRIPTION - ORIENTATION: ORIENTATION: LEFT

## 2021-08-11 ASSESSMENT — PAIN SCALES - GENERAL
PAINLEVEL_OUTOF10: 5
PAINLEVEL_OUTOF10: 0
PAINLEVEL_OUTOF10: 9
PAINLEVEL_OUTOF10: 0
PAINLEVEL_OUTOF10: 0
PAINLEVEL_OUTOF10: 5
PAINLEVEL_OUTOF10: 9
PAINLEVEL_OUTOF10: 7

## 2021-08-11 ASSESSMENT — PAIN DESCRIPTION - DESCRIPTORS: DESCRIPTORS: ACHING

## 2021-08-11 ASSESSMENT — ENCOUNTER SYMPTOMS
FACIAL SWELLING: 0
COLOR CHANGE: 0
APNEA: 0
EYE REDNESS: 0
EYE PAIN: 0
ABDOMINAL DISTENTION: 1
EYE ITCHING: 0
ABDOMINAL PAIN: 1
STRIDOR: 0

## 2021-08-11 ASSESSMENT — PAIN DESCRIPTION - LOCATION
LOCATION: ABDOMEN
LOCATION: ABDOMEN

## 2021-08-11 ASSESSMENT — PAIN DESCRIPTION - PAIN TYPE
TYPE: CHRONIC PAIN
TYPE: CHRONIC PAIN

## 2021-08-11 ASSESSMENT — COPD QUESTIONNAIRES: CAT_SEVERITY: MODERATE

## 2021-08-11 ASSESSMENT — PAIN DESCRIPTION - ONSET: ONSET: ON-GOING

## 2021-08-11 ASSESSMENT — PAIN DESCRIPTION - FREQUENCY: FREQUENCY: CONTINUOUS

## 2021-08-11 ASSESSMENT — PAIN DESCRIPTION - PROGRESSION: CLINICAL_PROGRESSION: NOT CHANGED

## 2021-08-11 NOTE — PLAN OF CARE
Problem: Respiratory:  Goal: Ability to maintain normal respiratory secretions will improve  Description: Ability to maintain normal respiratory secretions will improve  8/11/2021 0936 by Marciano العراقي RCP  Outcome: Ongoing     Problem: Skin Integrity:  Goal: Demonstration of wound healing without infection will improve  Description: Demonstration of wound healing without infection will improve  8/11/2021 0936 by Marciano العراقي RCP  Outcome: Ongoing     Problem: RESPIRATORY  Intervention: Respiratory assessment  Note: BRONCHOSPASM/BRONCHOCONSTRICTION     [x]         IMPROVE AERATION/BREATH SOUNDS  [x]   ADMINISTER BRONCHODILATOR THERAPY AS APPROPRIATE  [x]   ASSESS BREATH SOUNDS  [x]   IMPLEMENT AEROSOL/MDI PROTOCOL  [x]   PATIENT EDUCATION AS NEEDED

## 2021-08-11 NOTE — PROGRESS NOTES
[facility] Sepsis ([address] [A41.9]  Abdominal pain, unspecified abdominal location [R10.9] . Vitals:    08/11/21 1141   BP: 110/84   Pulse: 81   Resp: 16   Temp: 98.4 °F (36.9 °C)   SpO2: 96%   . Patients home meds are   Prior to Admission medications    Medication Sig Start Date End Date Taking? Authorizing Provider   ciprofloxacin (CIPRO) 500 MG tablet Take 1 tablet by mouth 2 times daily for 4 days Stop after 8/15 8/11/21 8/15/21 Yes Elva Doyle MD   metroNIDAZOLE (FLAGYL) 500 MG tablet Take 1 tablet by mouth 3 times daily for 4 days Stop after 8/15 8/11/21 8/15/21 Yes Elva Doyle MD   busPIRone (BUSPAR) 30 MG tablet Take 30 mg by mouth 2 times daily (with meals) 8/5/21   LOI Castillo NP   hydrOXYzine (ATARAX) 25 MG tablet Take 1 tablet by mouth 3 times daily as needed for Anxiety 8/5/21   YadiraLOI Lyon NP   traZODone (DESYREL) 50 MG tablet Take 1 tablet by mouth nightly as needed for Sleep 8/5/21 9/4/21  LOI Castillo NP   escitalopram (LEXAPRO) 5 MG tablet Take 1 tablet by mouth daily 8/5/21   [facility]   acamprosate (CAMPRAL) 333 MG tablet Take 2 tablets by mouth 3 times daily 8/5/21 9/4/21  LOI Castillo NP   carBAMazepine (TEGRETOL) 200 MG tablet Take 1 tablet by mouth 3 times daily 8/3/21   LOI Wade NP   topiramate (TOPAMAX) 50 MG tablet Take 1 tablet by mouth 2 times daily 8/3/21   LOI Wade NP   spironolactone (ALDACTONE) 50 MG tablet Take 1 tablet by mouth daily 8/4/21   LOI Wade NP   nicotine (NICODERM CQ) 21 MG/24HR Place 1 patch onto the skin daily 8/4/21   LOI Wade NP   nicotine polacrilex (NICORETTE) 2 MG gum Take 1 each by mouth every hour as needed for Smoking cessation 8/3/21   LOI Wade NP   pregabalin (LYRICA) 200 MG capsule Take 1 capsule by mouth 3 times daily for 90 days.  7/2/21 9/30/21  River Rice MD   KLOR-CON M20 20 MEQ extended release tablet TAKE ONE TABLET BY MOUTH DAILY 6/24/21   Ludivina Remy MD   amLODIPine (NORVASC) 5 MG tablet TAKE ONE TABLET BY MOUTH DAILY 6/3/21   Ludivina Remy MD   baclofen (LIORESAL) 10 MG tablet Take 1 tablet by mouth 3 times daily 5/25/21   Ludivina Remy MD   ibuprofen (ADVIL;MOTRIN) 600 MG tablet Take 1 tablet by mouth 3 times daily as needed for Pain 5/25/21   Ludivina Remy MD   ferrous sulfate (IRON 325) 325 (65 Fe) MG tablet Take 1 tablet by mouth 2 times daily 4/22/21   Ludivina Remy MD   rOPINIRole (REQUIP) 1 MG tablet Take 1 tablet by mouth nightly 4/1/21   Ludivina Remy MD   budesonide-formoterol [facility]) 160-4.5 MCG/ACT AERO Inhale 2 puffs into the lungs 2 times daily 3/31/21   Ludivina Remy MD   tiotropium (SPIRIVA RESPIMAT) 2.5 MCG/ACT AERS inhaler Inhale 2 puffs into the lungs daily 2/26/21 7/8/21  Toby Willard MD   sodium chloride (ALTAMIST SPRAY) 0.65 % nasal spray 1 spray by Nasal route as needed for Congestion 12/28/20   Ludivina Remy MD   ACETAMINOPHEN EXTRA STRENGTH 500 MG tablet Take 500 mg by mouth 3 times daily as needed 5/8/20   Laisha eNwby MD   albuterol sulfate HFA (VENTOLIN HFA) 108 (90 Base) MCG/ACT inhaler Inhale 2 puffs into the lungs 4 times daily as needed for Wheezing 6/11/20   Ludivina Remy MD   vitamin B-1 (THIAMINE) 100 MG tablet Take 1 tablet by mouth daily 7/12/19   Ludivina Remy MD   vitamin D (ERGOCALCIFEROL) 00056 units CAPS capsule Take 1 capsule by mouth once a week 6/19/19   Ludivina Remy MD   folic acid (FOLVITE) 1 MG tablet Take 1 tablet by mouth daily 6/19/19   Ludivina Remy MD   .  Recent Surgical History: None = 0     Assessment alert cooperative on 2lpm nasal cannula strong NPC  RR 18  Breath Sounds: clear diminished      Bronchodilator assessment at level  1  Hyperinflation assessment at level   Secretion Management assessment at level      [x]    Bronchodilator Assessment  BRONCHODILATOR ASSESSMENT SCORE  Score 0 1 2 3 4 5   Breath Sounds   []  Patient Baseline [x]  No Wheeze good aeration []  Faint, scattered wheezing, good aeration []  Expiratory Wheezing and or moderately diminished []  Insp/Exp wheeze and/or very diminished []  Insp/Exp and/ or marked distress   Respiratory Rate   []  Patient Baseline [x]  Less than 20 [x]  Less than 20 []  20-25 []  Greater than 25 []  Greater than 25   Peak flow % of Pred or PB [x]  NA   []  Greater than 90%  []  81-90% []  71-80% []  Less than or equal to 70%  or unable to perform []  Unable due to Respiratory Distress   Dyspnea re []  Patient Baseline [x]  No SOB [x]  No SOB []  SOB on exertion []  SOB min activity []  At rest/acute   e FEV% Predicted       [x]  NA []  Above 69%  []  Unable []  Above 60-69%  []  Unable []  Above 50-59%  []  Unable []  Above 35-49%  []  Unable []  Less than 35%  []  Unable                 []  Hyperinflation Assessment  Score 1 2 3   CXR and Breath Sounds   []  Clear []  No atelectasis  Basilar aeration []  Atelectasis or absent basilar breath sounds   Incentive Spirometry Volume  (Per IBW)   []  Greater than or equal to 15ml/Kg []  less than 15ml/Kg []  less than 15ml/Kg   Surgery within last 2 weeks []  None or general   []  Abdominal or thoracic surgery  []  Abdominal or thoracic   Chronic Pulmonary Historyre []  No []  Yes []  Yes     []  Secretion Management Assessment  Score 1 2 3   Bilateral Breath Sounds   []  Occasional Rhonchi []  Scattered Rhonchi []  Course Rhonchi and/or poor aeration   Sputum    []  Small amount of thin secretions []  Moderate amount of viscous secretions []  Copius, Viscious Yellow/ Secretions   CXR as reported by physician []  clear  []  Unavailable []  Infiltrates and/or consolidation  []  Unavailable []  Mucus Plugging and or lobar consolidation  []  Unavailable   Cough []  Strong, productive cough []  Weak productive cough []  No cough or weak non-productive cough   Wing Ami RCP 12:51 PM                            FEMALE                                  MALE                            FEV1 Predicted Normal Values                        FEV1 Predicted Normal Values          Age                                     Height in Feet and Inches       Age                                     Height in Feet and Inches       4' 11\" 5' 1\" 5' 3\" 5' 5\" 5' 7\" 5' 9\" 5' 11\" 6' 1\"  4' 11\" 5' 1\" 5' 3\" 5' 5\" 5' 7\" 5' 9\" 5' 11\" 6' 1\"   43 - 45 2.49 2.66 2.84 3.03 3.22 3.42 3.62 3.83 42 - 45 2.82 3.03 3.26 3.49 3.72 3.96 4.22 4.47   46 - 49 2.40 2.57 2.76 2.94 3.14 3.33 3.54 3.75 46 - 49 2.70 2.92 3.14 3.37 3.61 3.85 4.10 4.36   50 - 53 2.31 2.48 2.66 2.85 3.04 3.24 3.45 3.66 50 - 53 2.58 2.80 3.02 3.25 3.49 3.73 3.98 4.24   54 - 57 2.21 2.38 2.57 2.75 2.95 3.14 3.35 3.56 54 - 57 2.46 2.67 2.89 3.12 3.36 3.60 3.85 4.11   58 - 61 2.10 2.28 2.46 2.65 2.84 3.04 3.24 3.45 58 - 61 2.32 2.54 2.76 2.99 3.23 3.47 3.72 3.98   62 - 65 1.99 2.17 2.35 2.54 2.73 2.93 3.13 3.34 62 - 65 2.19 2.40 2.62 2.85 3.09 3.33 3.58 3.84   66 - 69 1.88 2.05 2.23 2.42 2.61 2.81 3.02 3.23 66 - 69 2.04 2.26 2.48 2.71 2.95 3.19 3.44 3.70   70+ 1.82 1.99 2.17 2.36 2.55 2.75 2.95 3.16 70+ 1.97 2.19 2.41 2.64 2.87 3.12 3.37 3.62             Predicted Peak Expiratory Flow Rate                                       Height (in)  Female       Height (in) Male           Age 78 86 06 22 90 77 78 74 Age            21 344 357 372 387 402 417 432 446  60 62 64 66 68 70 72 74 76   25 337 352 366 381 396 411 426 441 25 447 476 505 533 562 591 619 648 677   30 329 344 359 374 389 404 419 434 30 437 466 494 523 552 580 609 638 667   35 322 337 351 366 381 396 411 426 35 426 455 484 512 541 570 598 627 657   40 314 329 344 359 374 389 404 419 40 416 445 473 502 531 559 588 617 647   45 307 322 336 351 366 381 396 411 45 405 434 463 491 520 549 577 606 636   50 299 314 329 344 359 374 389 404 50 395 424 452 481 510 538 567 596 625   55 292 307 321 336 351 366 182 724 14 628 930 387 792 381 383 304 895 810   28 036 031 921 383 413 739 234 761 33 771 746 039 399 665 989 169 109 299   09 312 457 646 356 840 277 303 030 44 688 456 300 328 579 000 303 090 041   63 928 284 299 314 329 344 359 374 70 353 382 410 439 468 496 525 554 583   75 261 274 289 305 319 334 348 364 75 344 372 457 389 932 785 699 250 942   04 729 011 762 653 421 693 152 074 41 156 710 222 125 544 492 463 096 747

## 2021-08-11 NOTE — CONSULTS
[facility] -   Infectious diseases evaluation  admission date 8/9/2021    reason for consultation:   pancreatitis - leukocytosis    Impression :   Current:  · bandemia  · Left colitis, diverticulosis  · Acute tail pancreatitis  · Peripancreatic fluid collection - improved, drain 7/28  · 8/9 drain plugged and increased left abd pain - admitted, unplugged and pain better  · No fever  · Left LL mucous plug and telectasis  · C/O severe LUQ pain  · Livedo reticularis over the back    Other:  ·   Discussion / summary of stay / plan of care   ·   Recommendations     · Left LL atelectasis - spirometer  · Cause of the leukocytosis unclear, might e from left FRANSISCO drainage obstruction, cant R/O drain infection as well  · keep zosyn  · WBC improved on Zosyn  · AB 7 days total ending treatment on 8/15  · Consider switching to Ciprofloxacin po upon discharge; QTC (352)  · ERCP and panc stent completed today 8/11  · AB reconsiled - ok for DC    Infection Control Recommendations   · [place] Precautions      Antimicrobial Stewardship Recommendations   · Simplification of therapy  · Targeted therapy      Coordination ofOutpatient Care:   · Estimated Length of IV antimicrobials:  · Patient will need Midline / picc Catheter Insertion:   · Patient will need SNF:  · Patient will need outpatient wound care:     History of Present Illness:   Initial history:  Lizz Givens is a 45y.o.-year-old female   Back w increased LUQ pain and drain lugged - pain better after drain opened  Fluid in bulb is old blood - not foul and no pus or stools CT AP improved collection size and pain, no fever all along and rash  Was seen for panc tail pancreatitis recently etoh relapsed and drain p;avced in the bella pancreatic cysts, and planned for ERC and panc stent outpt, did not have nat to happen, came right back.     Not septic looking - leukocytosis w LLL atelectasis    8/10  Continues to feel pain but has noted improvement since yesterday. There is still tenderness to palpation of the left upper abdomen as well as pressure in the area. Drain is continuing to drain and patient has been emptying her drains appropriately. She is interested in learning how to flush her drain herself-was told that she could speak to GI. Pt expresses concern regarding her current abx tx and its effect on her pancreas and ERCP. It was explained to her that there are no current contraindications to her abx tx and she expressed understanding. 8/11   Patient scheduled to undergo ERCP today. Continues to feel pain same as yesterday. Complains of some abdominal distention as well. Mentions that she feels she is passing some flatus but could be passing more. Abd feels distended on PE. Anxious about her upcoming ERCP procedure. Would like to speak to someone regarding at home drain care/flushing. She has an increase in appetite and would like to start eating solid foods again. Mother was present in room today. Asked if there could be a nutritionist consult for an at home diet plan.        Interval changes  8/11/2021   Patient Vitals for the past 8 hrs:   BP Temp Temp src Pulse Resp SpO2 Weight   08/11/21 0600 -- -- -- 89 25 92 % --   08/11/21 0537 -- -- -- -- -- -- 130 lb 1.1 oz (59 kg)   08/11/21 0457 111/77 98.2 °F (36.8 °C) Oral 97 14 -- --   08/11/21 0300 -- -- -- 77 12 -- --   08/11/21 0200 (!) 129/96 98.5 °F (36.9 °C) Oral 84 16 95 % --       8/10  Patient appears alert and oriented with anxious affect   abd pain bettef - FRANSISCO drainage bloody and stable -  WBC nl on Zosyn   Afebrile  Bcx Neg    8/11  Patient appears well but remains anxious   Abd pain same as yesterday- FRANSISCO drainage bloody and stable   WBC nl on Zosyn   Afebrile   Bcx Neg  ERCP scheduled for today     Summary of relevant labs:  Labs:  Wbc 24-8.8-10.4  Lipase 471-72  Micro:  BC   Imaging:  CT AP 8/9  Approximately 50% decrease in size of the septated fluid collection in the left upper quadrant, with residual components measuring up to 7 x 5 cm with   indwelling percutaneous drain. 2. New area adjacent edema within the mesentery at the splenic flexure of the   colon has developed without additional abscess or fluid collection. 3. Persistent inflammatory changes at the splenic flexure of the colon,   similar to the prior exam.  These could be reactive to the adjacent fluid   collection. 4. Left lower lobe atelectasis. Mucous plugging in the right lower lobe   segmental bronchi. 5. Subacute T12 and L5 compression fractures with 25% vertebral body height   loss. I have personally reviewed the past medical history, past surgical history, medications, social history, and family history, and I haveupdated the database accordingly. Allergies:   Patient has no known allergies. Review of Systems:     Review of Systems   Constitutional: Negative for activity change, appetite change and diaphoresis. HENT: Negative for congestion, ear pain and facial swelling. Eyes: Negative for pain, redness and itching. Respiratory: Negative for apnea and stridor. Cardiovascular: Negative for chest pain. Gastrointestinal: Positive for abdominal distention and abdominal pain. Endocrine: Negative for heat intolerance and polydipsia. Genitourinary: Negative for dysuria, frequency and pelvic pain. Musculoskeletal: Negative for arthralgias. Skin: Negative for color change and pallor. Allergic/Immunologic: Negative for immunocompromised state. Neurological: Negative for dizziness, syncope and light-headedness. Hematological: Negative for adenopathy. Psychiatric/Behavioral: Negative for agitation, confusion and decreased concentration. The patient is nervous/anxious. Physical Examination :       Physical Exam  Constitutional:       General: She is not in acute distress. Appearance: Normal appearance.  She is not ill-appearing, toxic-appearing or diaphoretic. HENT:      Head: Normocephalic and atraumatic. Nose: Nose normal. No rhinorrhea. Mouth/Throat:      Mouth: Mucous membranes are moist.      Pharynx: No posterior oropharyngeal erythema. Eyes:      General: No scleral icterus. Conjunctiva/sclera: Conjunctivae normal.   Cardiovascular:      Rate and Rhythm: Normal rate and regular rhythm. Heart sounds: Normal heart sounds. No murmur heard. No friction rub. Pulmonary:      Effort: No respiratory distress. Breath sounds: Normal breath sounds. No stridor. Abdominal:      General: There is no distension. Tenderness: There is abdominal tenderness. Genitourinary:     Comments: No helms  Musculoskeletal:         General: No swelling, tenderness, deformity or signs of injury. Cervical back: Neck supple. No rigidity or tenderness. Skin:     General: Skin is dry. Coloration: Skin is not jaundiced or pale. Neurological:      General: No focal deficit present. Mental Status: She is alert and oriented to person, place, and time. Cranial Nerves: No cranial nerve deficit. Sensory: No sensory deficit. Motor: No weakness. Psychiatric:         Mood and Affect: Mood normal.         Thought Content:  Thought content normal.         Past Medical History:     Past Medical History:   Diagnosis Date    Acute respiratory failure with hypoxia ([address] 10/16/2020    Alcohol withdrawal syndrome, with delirium ([address] 12/14/2019    Alcoholism ([address]     Anemia 10/2020    GI bleed    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24, the patient underwent 2 surgical resections without known recurrence 10/23/2020    Closed fracture of lateral portion of left tibial plateau 89/29/4297    COPD (chronic obstructive pulmonary disease) ([address]     CO2 retainer, on Bipap at night for this, Dr. Farhan Fry ( last visit 11/20/2020 and note on chart )    Depression     bipolar, major depressive disorder, ptsd, anxiety    Dysphagia  GI bleed 10/2020    Hypertension     Memory loss     Oxygen dependent     pt stated not needed as of 12/9/2020    Pain, joint, ankle and foot     Pancreatic lesion 10/2020    Dr. Kerwin Parnell working up pt    Peripheral neuropathy     Seizures ([address]     also baseline tremors-last sz summer 2020    Tension headache     Under care of team 07/01/2021    neuro-Dr Wan-[facility]-last visit june 2021    Under care of team 07/01/2021    pain management-Hamzah Martines-nery jimenez-last visit june 2021    Under care of team 07/01/2021    pulmonology-Dr Dueñas-[facility]-last virtual visit feb 2021    Under care of team 07/01/2021    psych-bahnfeldt NP-telemed-last visit may 2021    Under care of team 07/01/2021    ct-[address]-last visit june 2021    Wellness examination 07/01/2021    pcp-[address]-last visit may 2021       Past Surgical  History:     Past Surgical History:   Procedure Laterality Date   [place] [id number]    astrocytoma times 2    COLONOSCOPY N/A 10/22/2020    COLONOSCOPY DIAGNOSTIC performed by Sallie Arriola MD at [facility]    [address]  7/28/2021    CT ABSCESS DRAIN SUBCUTANEOUS 7/28/2021 [facility] CT SCAN    ENDOSCOPIC ULTRASOUND (LOWER) N/A 12/9/2020    ENDOSCOPIC ULTRASOUND, UPPER WITH LINEAR SCOPE FOR BIOPSY OF MASS ON HEAD OF PANCREAS performed by Linette Abdullahi MD at [address] Left 7-3-13    ORIF tibial plateau    FRACTURE SURGERY Right     small finger metacarpal fracture    HAND SURGERY      pins    HYSTERECTOMY  2003    UPPER GASTROINTESTINAL ENDOSCOPY N/A 10/22/2020    EGD BIOPSY performed by Sallie Arriola MD at [address] [place] [address] N/A 4/5/2021    EGD BIOPSY performed by Sallie Arriola MD at [facility]       Medications:      lipase-protease-amylase  36,000 Units Oral TID     amLODIPine  5 mg Oral Daily    baclofen  10 mg Oral TID   [address] [place] [address] budesonide-formoterol  2 puff Inhalation BID    busPIRone  30 mg Oral BID WC    carBAMazepine  200 mg Oral TID    ferrous sulfate  325 mg Oral BID    folic acid  1 mg Oral Daily    nicotine  1 patch Transdermal Daily    pregabalin  200 mg Oral TID    topiramate  50 mg Oral BID    vitamin B-1  100 mg Oral Daily    sodium chloride flush  5-40 mL Intravenous 2 times per day    [Held by provider] enoxaparin  40 mg Subcutaneous Daily    pantoprazole  40 mg Oral QAM AC    piperacillin-tazobactam  3,375 mg Intravenous Q8H    traZODone  50 mg Oral Nightly       Social History:     Social History     Socioeconomic History    Marital status: Single     Spouse name: Not on file    Number of children: Not on file    Years of education: Not on file    Highest education level: Not on file   Occupational History    Not on file   Tobacco Use    Smoking status: Current Every Day Smoker     Packs/day: 1.00     Years: 30.00     Pack years: 30.00     Types: Cigarettes    Smokeless tobacco: Never Used    Tobacco comment: plans on quitting in the future    Vaping Use    Vaping Use: Never used   Substance and Sexual Activity    Alcohol use: Not Currently     Alcohol/week: 0.0 standard drinks    Drug use: Yes     Frequency: 2.0 times per week     Types: Marijuana    Sexual activity: Not Currently   Other Topics Concern    Not on file   Social History Narrative    Not on file     Social Determinants of Health     Financial Resource Strain: Medium Risk    Difficulty of Paying Living Expenses: Somewhat hard   Food Insecurity: No Food Insecurity    Worried About Running Out of Food in the Last Year: Never true    [place] of Food in the Last Year: Never true   Transportation Needs:     Lack of Transportation (Medical):      Lack of Transportation (Non-Medical):    Physical Activity:     Days of Exercise per Week:     Minutes of Exercise per Session:    Stress:     Feeling of Stress :    Social Connections:     Frequency of Communication with Friends and Family:     Frequency of Social Gatherings with Friends and Family:     Attends Islam Services:     Active Member of Clubs or Organizations:     Attends Club or Organization Meetings:     Marital Status:    Intimate Partner Violence:     Fear of Current or Ex-Partner:     Emotionally Abused:     Physically Abused:     Sexually Abused:        Family History:     Family History   Problem Relation Age of Onset    Other Father     Cancer Maternal Grandmother     Heart Disease Paternal Grandmother     Esophageal Cancer Maternal Aunt       Medical Decision Making:   I have independently reviewed/ordered the following labs:    CBC with Differential:   Recent Labs     08/10/21  0549 08/11/21  0527   WBC 8.8 10.4   HGB 8.3* 8.3*   HCT 28.0* 27.3*   * 757*   LYMPHOPCT 20* 18*   MONOPCT 12 10     BMP:  Recent Labs     08/10/21  0549 08/11/21  0527    137   K 3.6* 3.5*    101   CO2 22 20   BUN 5* 4*   CREATININE 0.55 0.63   MG  --  1.5*     Hepatic Function Panel:   Recent Labs     08/09/21  0533   PROT 7.0   LABALBU 3.0*   BILITOT 0.22*   ALKPHOS 250*   ALT 10   AST 19     No results for input(s): RPR in the last 72 hours. No results for input(s): HIV in the last 72 hours. No results for input(s): BC in the last 72 hours. Lab Results   Component Value Date    CREATININE 0.63 08/11/2021    GLUCOSE 85 08/11/2021    GLUCOSE 92 04/30/2012       Detailed results: Thank you for allowing us to participate in the care of this patient. Please call with questions. This note is created with the assistance of a speech recognition program.  While intending to generate adocument that actually reflects the content of the visit, the document can still have some errors including those of syntax and sound a like substitutions which may escape proof reading. It such instances, actual meaningcan be extrapolated by contextual diversion.     Raffi Allred [facility]  Office: [phone]  Perfect serve / office [phone]      I have discussed the care of the patient, including pertinent history and exam findings,  with the resident. I have seen and examined the patient and the key elements of all parts of the encounter have been performed by me. I agree with the assessment, plan and orders as documented by the resident.     Lori Zarco, Infectious Diseases

## 2021-08-11 NOTE — PROGRESS NOTES
STRAIGHT CATHED FOR 400MLS CLEAR LIGHT YELLOW URINE AFTER INDUCTION, FOR PTS INABILITY TO VOID PRIOR TO LEAVING HOLDING AREA.

## 2021-08-11 NOTE — CARE COORDINATION
08/11/21 1803   Readmission Assessment   Number of Days since last admission? 1-7 days   Previous Disposition Home with Home Health   Who is being Interviewed Patient;Caregiver   What was the patient's/caregiver's perception as to why they think they needed to return back to the hospital?   (didnt get educated on how to irrigate FRANSISCO drain, increasing abd pain)   Did you visit your Primary Care Physician after you left the hospital, before you returned this time? No   Why weren't you able to visit your PCP? Other (Comment)  (Appt 2 weeks out)   Did you see a specialist, such as Cardiac, Pulmonary, Orthopedic Physician, etc. after you left the hospital? No   Who advised the patient to return to the hospital? Self-referral   Does the patient report anything that got in the way of taking their medications? No   In our efforts to provide the best possible care to you and others like you, can you think of anything that we could have done to help you after you left the hospital the first time, so that you might not have needed to return so soon?  Teach back instructions regarding management of illness

## 2021-08-11 NOTE — FLOWSHEET NOTE
Assessment: Patient is a 45 y.o. female who arrived to the hospital due to \"acute recurrent pancreatitis. \" Patient was awake and sitting up in hospital bed, when  visited. Intervention:  visited patient per initial rounding visits.  is familiar with patient from previous employment and hospitalizations.  met with patient in the lobby the previous shift and offered to visit upon return to the hospital.  visited with patient in room and learned about patient's upcoming procedure for a possible \"stent placement. \" Patient shared about her condition and her feelings of fear over the upcoming procedure. Patient expressed feelings of guilt because \"I did it to myself. \" Dorethea Persons provided an active listening presence, support, encouragement and companionship throughout visit. Patient thanked  for support. Outcome: Patient was receptive of 's visit and support. Plan: Chaplains can make follow-up visit, per request. Hien Bennett can be reached 24/7 via Dropcam. Hope Tan     08/10/21 5650   Encounter Summary   Services provided to: Patient   Referral/Consult From: [facility] Parent; Family members;Friends/neighbors   Continue Visiting   (8/10/2021)   Complexity of Encounter Moderate   Length of Encounter 1 hour   Spiritual Assessment Completed Yes   Routine   Type Pre-procedure   Spiritual/Mandaeism   Type Spiritual support   Assessment Approachable;Tearful;Helplessness   Intervention Active listening;Explored feelings, thoughts, concerns;Explored coping resources;Nurtured hope;Sustaining presence/ Ministry of presence; Discussed belief system/Restoration practices/monet;Discussed illness/injury and it's impact   Outcome Comfort;Expressed gratitude;Engaged in conversation; Shared life review;Expressed feelings/needs/concerns;Encouraged; Hopeful;Receptive

## 2021-08-11 NOTE — ANESTHESIA PRE PROCEDURE
Department of Anesthesiology  Preprocedure Note       Name:  Giuseppe Hobbs   Age:  45 y.o.  :  1970                                          MRN:  [id number]         Date:  2021      Surgeon: Yarely Baker):  Chantal Richardson MD    Procedure: Procedure(s):  ERCP ENDOSCOPIC RETROGRADE CHOLANGIOPANCREATOGRAPHY    Medications prior to admission:   Prior to Admission medications    Medication Sig Start Date End Date Taking?  Authorizing Provider   ciprofloxacin (CIPRO) 500 MG tablet Take 1 tablet by mouth 2 times daily for 4 days Stop after 8/15 8/11/21 8/15/21  Lori Mccabe MD   metroNIDAZOLE (FLAGYL) 500 MG tablet Take 1 tablet by mouth 3 times daily for 4 days Stop after 8/15 8/11/21 8/15/21  Anuradha Otero MD   busPIRone (BUSPAR) 30 MG tablet Take 30 mg by mouth 2 times daily (with meals) 21   LOI Mcarthur NP   hydrOXYzine (ATARAX) 25 MG tablet Take 1 tablet by mouth 3 times daily as needed for Anxiety 21   LOI Mcarthur NP   traZODone (DESYREL) 50 MG tablet Take 1 tablet by mouth nightly as needed for Sleep 21  LOI Mcarthur NP   escitalopram (LEXAPRO) 5 MG tablet Take 1 tablet by mouth daily 21   LOI Mcarthur NP   acamprosate (CAMPRAL) 333 MG tablet Take 2 tablets by mouth 3 times daily 21  LOI Mcarthur NP   carBAMazepine (TEGRETOL) 200 MG tablet Take 1 tablet by mouth 3 times daily 8/3/21   LOI Lambert NP   topiramate (TOPAMAX) 50 MG tablet Take 1 tablet by mouth 2 times daily 8/3/21   LOI Lambert NP   spironolactone (ALDACTONE) 50 MG tablet Take 1 tablet by mouth daily 21   LOI Lambert NP   nicotine (NICODERM CQ) 21 MG/24HR Place 1 patch onto the skin daily 21   LOI Lambert NP   nicotine polacrilex (NICORETTE) 2 MG gum Take 1 each by mouth every hour as needed for Smoking cessation 8/3/21   LOI Lambert NP   pregabalin (LYRICA) 200 MG capsule Take 1 capsule by mouth 3 times daily for 90 days. 7/2/21 9/30/21  Fernie Feliciano MD   KLOR-CON M20 20 MEQ extended release tablet TAKE ONE TABLET BY MOUTH DAILY 6/24/21   Ludivina Joel MD   amLODIPine (NORVASC) 5 MG tablet TAKE ONE TABLET BY MOUTH DAILY 6/3/21   Ludivina Joel MD   baclofen (LIORESAL) 10 MG tablet Take 1 tablet by mouth 3 times daily 5/25/21   Ludivina Joel MD   ibuprofen (ADVIL;MOTRIN) 600 MG tablet Take 1 tablet by mouth 3 times daily as needed for Pain 5/25/21   Ludivina Joel MD   ferrous sulfate (IRON 325) 325 (65 Fe) MG tablet Take 1 tablet by mouth 2 times daily 4/22/21   Ludivina Joel MD   rOPINIRole (REQUIP) 1 MG tablet Take 1 tablet by mouth nightly 4/1/21   Ludivina Joel MD   budesonide-formoterol [facility]) 160-4.5 MCG/ACT AERO Inhale 2 puffs into the lungs 2 times daily 3/31/21   Ludivina Joel MD   tiotropium (SPIRIVA RESPIMAT) 2.5 MCG/ACT AERS inhaler Inhale 2 puffs into the lungs daily 2/26/21 7/8/21  Chinmay Sutherland MD   sodium chloride (ALTAMIST SPRAY) 0.65 % nasal spray 1 spray by Nasal route as needed for Congestion 12/28/20   Ludivina Joel MD   ACETAMINOPHEN EXTRA STRENGTH 500 MG tablet Take 500 mg by mouth 3 times daily as needed 5/8/20   Laisha Newby MD   albuterol sulfate HFA (VENTOLIN HFA) 108 (90 Base) MCG/ACT inhaler Inhale 2 puffs into the lungs 4 times daily as needed for Wheezing 6/11/20   Ludivina Joel MD   vitamin B-1 (THIAMINE) 100 MG tablet Take 1 tablet by mouth daily 7/12/19   Ludivina Joel MD   vitamin D (ERGOCALCIFEROL) 17003 units CAPS capsule Take 1 capsule by mouth once a week 6/19/19   Ludivina Joel MD   folic acid (FOLVITE) 1 MG tablet Take 1 tablet by mouth daily 6/19/19   Ludivina Joel MD       Current medications:    No current facility-administered medications for this visit.      Current Outpatient Medications   Medication Sig Dispense Refill    ciprofloxacin (CIPRO) 500 MG tablet Take 1 tablet by mouth 2 times daily mg  100 mg Oral Daily Linda Armenta MD   100 mg at 08/11/21 0800    sodium chloride flush 0.9 % injection 5-40 mL  5-40 mL Intravenous 2 times per day Linda Armenta MD   10 mL at 08/11/21 0758    sodium chloride flush 0.9 % injection 5-40 mL  5-40 mL Intravenous PRN Linda Armenta MD        0.9 % sodium chloride infusion  25 mL Intravenous PRN Linda Armenta MD        ondansetron (ZOFRAN-ODT) disintegrating tablet 4 mg  4 mg Oral Q8H PRN Linda Armenta MD        Or    ondansetron (ZOFRAN) injection 4 mg  4 mg Intravenous Q6H PRN Linda Armenta MD        magnesium hydroxide (MILK OF MAGNESIA) 400 MG/5ML suspension 30 mL  30 mL Oral Daily PRN Linda Armenta MD        acetaminophen (TYLENOL) tablet 650 mg  650 mg Oral Q6H PRN Linda Armenta MD   650 mg at 08/11/21 9544    Or    acetaminophen (TYLENOL) suppository 650 mg  650 mg Rectal Q6H PRN Linda Armenta MD        potassium chloride (KLOR-CON M) extended release tablet 40 mEq  40 mEq Oral PRN Linda Armenta MD        Or    potassium bicarb-citric acid (EFFER-K) effervescent tablet 40 mEq  40 mEq Oral PRN Linda Armenta MD        Or    potassium chloride 10 mEq/100 mL IVPB (Peripheral Line)  10 mEq Intravenous PRN Linda Armenta MD        magnesium sulfate 2000 mg in 50 mL IVPB premix  2,000 mg Intravenous PRN Linda Armenta MD        [Held by provider] enoxaparin (LOVENOX) injection 40 mg  40 mg Subcutaneous Daily Linda Armenta MD   40 mg at 08/10/21 0845    pantoprazole (PROTONIX) tablet 40 mg  40 mg Oral QAM AC Linda Armenta MD   40 mg at 08/11/21 0752    lactated ringers infusion   Intravenous Continuous Linda Armenta  mL/hr at 08/10/21 0705 New Bag at 08/10/21 0705    morphine (PF) injection 1 mg  1 mg Intravenous Q4H PRN Linda Armenta MD   1 mg at 08/11/21 0932    piperacillin-tazobactam (ZOSYN) 3,375 mg in dextrose 5 % 50 mL IVPB extended infusion (mini-bag)  3,375 mg Intravenous Q8H Danni Perez MD   Stopped at 08/11/21 1111    traZODone (DESYREL) tablet 50 mg  50 mg Oral Nightly LOI Burgess - CNP   50 mg at 08/10/21 2342       Allergies:  No Known Allergies    Problem List:    Patient Active Problem List   Diagnosis Code    Acute bronchitis J20.9    Reflex sympathetic dystrophy of lower limb G90.529    Essential hypertension I10    Nicotine addiction F17.200    Psychophysiological insomnia F51.04    Anxiety O62.6    Alcoholic peripheral neuropathy (HCC) G62.1    Hypokalemia E87.6    Macrocytic anemia D53.9    Hypomagnesemia E83.42    Tobacco use Z72.0    Ambulatory dysfunction R26.2    Seizure disorder ([address] G40.909    COPD with acute exacerbation ([address] J44.1    Paresthesia of lower extremity R20.2    Acute respiratory failure with hypoxia ([facility]) J96.01    Pancreatic cyst K86.2    Recurrent major depressive disorder (HCC) F33.9    Elevated CA 19-9 level R97.8    Perineal mass, female N90.89    Esophagitis candidal  B37.81    Condyloma A63.0    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24, the patient underwent 2 surgical resections without known recurrence C71.9    Alcohol use disorder, severe, in early remission ([address] G25.51    Metabolic encephalopathy U28.24    AMAN (generalized anxiety disorder) F41.1    Major depressive disorder, recurrent severe without psychotic features ([address] F33.2    Esophageal dysphagia R13.10    Chronic peripheral neuropathic pain M79.2, G89.29    History of alcohol abuse F10.11    History of petit-mal seizures Z86.69    Intractable episodic tension-type headache G44. [address] history of astrocytoma Z85.841    Multilevel spine pain M54.9    Chronic pain syndrome G89.4    Marijuana abuse F12.10    Severe sepsis (HCC) A41.9, R65.20    Closed fracture of fifth thoracic vertebra (HCC) S22.059A    Diverticulitis of large intestine with abscess without bleeding K57.20    Elevated brain natriuretic peptide (BNP) level R79.89    Elevated alkaline phosphatase level R74.8    Chronic pancreatitis ([facility]) K86.1    Erythema ab igne L59.0    Compression fracture of thoracic vertebra ([facility]) S22.000A    Compression of lumbar vertebra ([facility]) K80.988R    Metabolic acidosis with increased anion gap and accumulation of organic acids E87.2    Community acquired pneumonia of left lower lobe of lung J18.9    Septicemia ([facility]) A41.9    Alcohol-induced acute pancreatitis K85.20    Fluid collection of pancreas K86.89    Diverticulitis of large intestine without perforation or abscess with bleeding K57.33    Pseudocyst of pancreas K86.3    Bandemia D72.825    Sepsis ([facility]) A41.9    Acute recurrent pancreatitis K85.90    Atelectasis of left lung J98.11       Past Medical History:        Diagnosis Date    Acute respiratory failure with hypoxia ([facility]) 10/16/2020    Alcohol withdrawal syndrome, with delirium [facility] 12/14/2019    Alcoholism ([facility]     Anemia 10/2020    GI bleed    Astrocytoma ([facility] - 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24, the patient underwent 2 surgical resections without known recurrence 10/23/2020    Closed fracture of lateral portion of left tibial plateau 66/29/1435    COPD (chronic obstructive pulmonary disease) ([facility])     CO2 retainer, on Bipap at night for this, Dr. Enzo Garcia ( last visit 11/20/2020 and note on chart )    Depression     bipolar, major depressive disorder, ptsd, anxiety    Dysphagia     GI bleed 10/2020    Hypertension     Memory loss     Oxygen dependent     pt stated not needed as of 12/9/2020    Pain, joint, ankle and foot     Pancreatic lesion 10/2020    Dr. Cata Reece working up pt    Peripheral neuropathy     Seizures ([facility]     also baseline tremors-last sz summer 2020    Tension headache     Under care of team 07/01/2021    [facility]    Under care of team 07/01/2021    pain management-Hamzah jimenez-last visit june 2021    Under care of team 07/01/2021    pulmonology-Dr Due[facility]as[facility]-last virtual visit feb 2021    Under care of team 07/01/2021    psych-bahnfeldt NP-telemed-last visit may 2021    Under care of team 07/01/2021    cs-[address]-last visit june 2021    Wellness examination 07/01/2021    pcp-Ludivina [address]-last visit may 2021       Past Surgical History:        Procedure Laterality Date    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    COLONOSCOPY N/A 10/22/2020    COLONOSCOPY DIAGNOSTIC performed by Stevenson Leo MD at [facility]    [address]  7/28/2021    CT ABSCESS DRAIN SUBCUTANEOUS 7/28/2021 [facility] CT SCAN    ENDOSCOPIC ULTRASOUND (LOWER) N/A 12/9/2020    ENDOSCOPIC ULTRASOUND, UPPER WITH LINEAR SCOPE FOR BIOPSY OF MASS ON HEAD OF PANCREAS performed by Richelle Pena MD at [address] Left 7-3-13    ORIF tibial plateau    FRACTURE SURGERY Right     small finger metacarpal fracture    HAND SURGERY      pins    HYSTERECTOMY  2003    UPPER GASTROINTESTINAL ENDOSCOPY N/A 10/22/2020    EGD BIOPSY performed by Stevenson Leo MD at [address] [facility] N/A 4/5/2021    EGD BIOPSY performed by Stevenson Leo MD at [facility]       Social History:    Social History     Tobacco Use    Smoking status: Current Every Day Smoker     Packs/day: 1.00     Years: 30.00     Pack years: 30.00     Types: Cigarettes    Smokeless tobacco: Never Used    Tobacco comment: plans on quitting in the future    Substance Use Topics    Alcohol use: Not Currently     Alcohol/week: 0.0 standard drinks                                Ready to quit: Not Answered  Counseling given: Not Answered  Comment: plans on quitting in the future       Vital Signs (Current): There were no vitals filed for this visit.                                            BP Readings from Last 3 Encounters:   08/11/21 120/76   08/03/21 (!) 115/91   07/08/21 118/80       NPO Status:                                                                                 BMI:   Wt Readings from Last 3 Encounters:   08/11/21 130 lb 1.1 oz (59 kg)   08/10/21 130 lb 8.2 oz (59.2 kg)   08/03/21 137 lb 12.6 oz (62.5 kg)     There is no height or weight on file to calculate BMI.    CBC:   Lab Results   Component Value Date    WBC 10.4 08/11/2021    RBC 2.83 08/11/2021    RBC 4.16 04/30/2012    HGB 8.3 08/11/2021    HCT 27.3 08/11/2021    MCV 96.5 08/11/2021    RDW 14.5 08/11/2021     08/11/2021     04/30/2012       CMP:   Lab Results   Component Value Date     08/11/2021    K 3.5 08/11/2021     08/11/2021    CO2 20 08/11/2021    BUN 4 08/11/2021    CREATININE 0.63 08/11/2021    GFRAA >60 08/11/2021    LABGLOM >60 08/11/2021    GLUCOSE 85 08/11/2021    GLUCOSE 92 04/30/2012    PROT 7.0 08/09/2021    CALCIUM 8.3 08/11/2021    BILITOT 0.22 08/09/2021    ALKPHOS 250 08/09/2021    AST 19 08/09/2021    ALT 10 08/09/2021       POC Tests: No results for input(s): POCGLU, POCNA, POCK, POCCL, POCBUN, POCHEMO, POCHCT in the last 72 hours.     Coags:   Lab Results   Component Value Date    PROTIME 10.9 08/09/2021    INR 1.0 08/09/2021    APTT 24.6 08/09/2021       HCG (If Applicable): No results found for: PREGTESTUR, PREGSERUM, HCG, HCGQUANT     ABGs: No results found for: PHART, PO2ART, BLB3GLF, LLA4LJQ, BEART, I8MPRASI     Type & Screen (If Applicable):  No results found for: LABABO, LABRH    Drug/Infectious Status (If Applicable):  Lab Results   Component Value Date    HEPCAB NONREACTIVE 07/14/2019       COVID-19 Screening (If Applicable):   Lab Results   Component Value Date    COVID19 Not Detected 07/22/2021    COVID19 Not Detected 05/20/2021    COVID19 Not Detected 12/05/2020           Anesthesia Evaluation  Patient summary reviewed and Nursing notes reviewed no history of anesthetic complications:   Airway: Mallampati: II  TM distance: >3 FB Neck ROM: full  Mouth opening: > = 3 FB Dental: normal exam   (+) partials      Pulmonary:normal exam    (+) COPD: moderate,                             Cardiovascular:  Exercise tolerance: good (>4 METS),   (+) hypertension: moderate,     (-) past MI, CAD, CABG/stent and dysrhythmias    ECG reviewed  Rhythm: regular  Rate: normal           Beta Blocker:  Not on Beta Blocker         Neuro/Psych:   (+) neuromuscular disease:, headaches:, psychiatric history:            GI/Hepatic/Renal: Neg GI/Hepatic/Renal ROS            Endo/Other: Negative Endo/Other ROS                    Abdominal:             Vascular: Other Findings:             Anesthesia Plan      general     ASA 3       Induction: intravenous. Anesthetic plan and risks discussed with patient. Use of blood products discussed with patient whom. Plan discussed with CRNA. Attending anesthesiologist reviewed and agrees with Pre Eval content      per medicine  1. Recurrent pancreatitis  2. Peripancreatic fluid collection   - ID on board   - pancreatic drain unplugged 8/9  - conitnue zosyn per ID recs until 8/15 with change to cipro on d/c   - GI planning on ERCP and stent today  - GI consulted due to above   - continue IVF   - started on creon TID  - replace magnesium  - lipase improved      3.  Hx of smoking    4. COPD  - Continue inhalers, encourage smoking cessation.   - Keep oxygen saturation above 88%     5. Seizure history     - continue tegretol 200 mg TID  - seizure precautions      6. Anxiety  - Buspar 30 mg BID     7. GERD  - continue protonix     8. EtOH abuse history  - Has not had alcohol since discharge per patient.   - Continue multivitamin, thiamin, folic acid     9. Iron deficinecy anemia  - Continue Ferrous sulfate     10. Myalgia  - continue baclofen        Susi Chapa MD   8/11/2021

## 2021-08-11 NOTE — CARE COORDINATION
[facility] Flow/Interdisciplinary Rounds Progress Note    Quality Flow Rounds held on August 11, 2021 at [address] Attending:  Bedside Nurse,  and Nursing Unit Leadership, Charge RN    Barriers to Discharge: Needs ercp today    Anticipated Discharge Date:       Anticipated Discharge Disposition:    Readmission Risk              Risk of Unplanned Readmission:  33           Discussed patient goal for the day, patient clinical progression, and barriers to discharge. The following Goal(s) of the Day/Commitment(s) have been identified:       Spoke with pt and Mom and SNF vwas discussed. Pt not opposed to SNF but would like to see how this test turns out before making a decision.  If going home, she would like to get a home O2 josiane Chambers RN  August 11, 2021

## 2021-08-11 NOTE — PLAN OF CARE
Pt scheduled for ERCP today, tearful with most interactions, emotional support provided. Pt up ambulatory and continues to take several smoke trips off unit. Will continue to monitor and report changes.

## 2021-08-11 NOTE — PROGRESS NOTES
[facility]  Office: [address], DO, James Segura, DO, Bennett Johnson, DO, Sandy Huang, DO, Becky Givens MD, Ana Maria Rodriguez MD, Gemma Briggs MD, Ishmael Collado MD, Daren Klinefelter, MD, Sachin Torres MD, Deondre Molina MD, Cher Collet, DO, Lisa Light MD, Ricke Bosworth, DO, Fabiola Burdick MD,  Asha Wood DO, Everardo Armenta MD, Deny Wild MD, Geronimo Garber MD, Nadia Alba MD, Meir Ribeiro MD, Alisha Cage MD, Paco Mcguire MD, Neeta Trammell, Brenda Villasenor, CNP, Ester Santos, CNP, Antony Waters, CNS, Domitila Hilton, CNP, Gerry Latham, CNP, Mindy Artis, CNP, Darling Marcos, CNP, Nacho Rodriguez, CNP, Jennifer Garber PA-C, Lora Chaidez, [facility], Malik Cee, CNP, Holly Vincent, CNP, Darling Montano, CNP, Merary Howard, CNP, Lane Santiago, CNP, Paulette Bullock, CNP, Jared Florez, CNP, Radha Calvin, [address] [place] [address]    Progress Note    8/11/2021    7:41 AM    Name:   Daphney Alcaraz  MRN:     [id number]     [facility] [place]      [de-identified]   Room:   [address] [place] [address] Day:  2  Admit Date:  8/9/2021  4:33 AM    PCP:   Celi Curran MD  Code Status:  Full Code    Subjective:     C/C:   Chief Complaint   Patient presents with    Wound Check      Interval History Status: improved. Pt was seen and examined this morning  No acute events overnight  No complaints at this time      Review of Systems:     12 point ROS performed and negative for anything other than what was stated in subjective     Medications:      Allergies:  No Known Allergies    Current Meds:   Scheduled Meds:    lipase-protease-amylase  36,000 Units Oral TID WC    amLODIPine  5 mg Oral Daily    baclofen  10 mg Oral TID    budesonide-formoterol  2 puff Inhalation BID    busPIRone  30 mg Oral BID WC    carBAMazepine  200 mg Oral TID    ferrous sulfate  325 mg Oral BID    folic acid  1 mg Oral Daily    nicotine  1 patch Transdermal Daily    pregabalin  200 mg Oral TID    topiramate  50 mg Oral BID    vitamin B-1  100 mg Oral Daily    sodium chloride flush  5-40 mL Intravenous 2 times per day    [Held by provider] enoxaparin  40 mg Subcutaneous Daily    pantoprazole  40 mg Oral QAM AC    piperacillin-tazobactam  3,375 mg Intravenous Q8H    traZODone  50 mg Oral Nightly     Continuous Infusions:    sodium chloride      lactated ringers 100 mL/hr at 08/10/21 0705     PRN Meds: albuterol sulfate HFA, hydrOXYzine, nicotine polacrilex, sodium chloride, sodium chloride flush, sodium chloride, ondansetron **OR** ondansetron, magnesium hydroxide, acetaminophen **OR** acetaminophen, potassium chloride **OR** potassium alternative oral replacement **OR** potassium chloride, magnesium sulfate, morphine    Data:     Past Medical History:   has a past medical history of Acute respiratory failure with hypoxia ([address] Alcohol withdrawal syndrome, with delirium ([address] Alcoholism ([address] Anemia, 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 Social History:   reports that she has been smoking cigarettes. She has a 30.00 pack-year smoking history. She has never used smokeless tobacco. She reports previous alcohol use. She reports current drug use. Frequency: 2.00 times per week. Drug: Marijuana.      Family History:   Family History   Problem Relation Age of Onset    Other Father     Cancer Maternal Grandmother     Heart Disease Paternal Grandmother    Coleman Saliva Esophageal Cancer Maternal Aunt        Vitals:  /77   Pulse 89   Temp 98.2 °F (36.8 °C) (Oral)   Resp 25   Ht 5' 5\" (1.651 m)   Wt 130 lb 1.1 oz (59 kg)   SpO2 92%   BMI 21.64 kg/m²   Temp (24hrs), Av.3 °F (36.8 °C), Min:98 °F (36.7 °C), Max:98.5 °F (36.9 °C)    No results for input(s): POCGLU in the last 72 hours. I/O (24Hr):     Intake/Output Summary (Last 24 hours) at 2021 0741  Last data filed at 2021 0457  Gross per 24 hour   Intake 50 ml   Output 1240 ml   Net -1190 ml       Labs:  Hematology:  Recent Labs     08/09/21  0533 08/10/21  0549 21   WBC 22.4* 8.8 10.4   RBC 3.63* 2.86* 2.83*   HGB 10.5* 8.3* 8.3*   HCT 33.6* 28.0* 27.3*   MCV 92.6 97.9 96.5   MCH 28.9 29.0 29.3   MCHC 31.3 29.6 30.4   RDW 14.4 14.6* 14.5*   PLT See Reflexed IPF Result 694* 757*   MPV NOT REPORTED 9.6 9.9   INR 1.0  --   --      Chemistry:  Recent Labs     08/09/21  0533 08/10/21  0549 21  05    138 137   K 4.0 3.6* 3.5*   CL 99 104 101   CO2 21 22 20   GLUCOSE 125* 86 85   BUN 10 5* 4*   CREATININE 0.76 0.55 0.63   MG  --   --  1.5*   ANIONGAP 16 12 16   LABGLOM >60 >60 >60   GFRAA >60 >60 >60   CALCIUM 8.9 8.2* 8.3*   TROPHS 13  --   --      Recent Labs     2133 08/10/21  0549   PROT 7.0  --    LABALBU 3.0*  --    AST 19  --    ALT 10  --    ALKPHOS 250*  --    BILITOT 0.22*  --    LIPASE 471* 72*     ABG:  Lab Results   Component Value Date    POCPH 7.467 2021    POCPCO2 44.7 2021    POCPO2 71.9 2021    POCHCO3 32.3 2021    NBEA NOT REPORTED 2021    PBEA 8 2021    WSD2OAL NOT REPORTED 2021    YFEX2AGO 95 2021    FIO2 INFORMATION NOT PROVIDED 2021     Lab Results   Component Value Date/Time    SPECIAL LEFT FA 10 ML 2021 05:15 AM     Lab Results   Component Value Date/Time    CULTURE NO GROWTH 1 DAY 2021 05:15 AM       Radiology:  CT ABDOMEN PELVIS W IV CONTRAST Additional Contrast? None    Result Date: 8/9/2021  1. Approximately 50% decrease in size of the septated fluid collection in the left upper quadrant, with residual components measuring up to 7 x 5 cm with indwelling percutaneous drain. 2. New area adjacent edema within the mesentery at the splenic flexure of the colon has developed without additional abscess or fluid collection. 3. Persistent inflammatory changes at the splenic flexure of the colon, similar to the prior exam.  These could be reactive to the adjacent fluid collection. 4. Left lower lobe atelectasis. Mucous plugging in the right lower lobe segmental bronchi. 5. Subacute T12 and L5 compression fractures with 25% vertebral body height loss. XR CHEST PORTABLE    Result Date: 8/9/2021  1. Interval decreased bibasilar ill-defined consolidation suggesting pneumonia. 2. Interval decreased volume of mild bilateral pleural effusions.        Physical Examination:        General appearance:  alert, cooperative and no distress  Mental Status:  oriented to person, place and time and normal affect  Lungs:  clear to auscultation bilaterally, normal effort  Heart:  regular rate and rhythm, no murmur  Abdomen:  soft, TTP, nondistended, normal bowel sounds, drain in place  Extremities:  no edema, redness, tenderness in the calves  Skin:  no gross lesions, rashes, induration    Assessment:        Hospital Problems         Last Modified POA    * (Principal) Acute recurrent pancreatitis 8/9/2021 Yes    Essential hypertension 8/9/2021 Yes    Nicotine addiction 8/9/2021 Yes    Anxiety 8/9/2021 Yes    Tobacco use 8/9/2021 Yes    Seizure disorder ([address] 8/9/2021 Yes    Acute respiratory failure with hypoxia ([address] 8/9/2021 Yes    Pancreatic cyst 8/9/2021 Yes    Recurrent major depressive disorder ([address] 8/9/2021 Yes    AMAN (generalized anxiety disorder) 8/9/2021 Yes    Chronic pancreatitis ([address] 8/9/2021 Yes    Pseudocyst of pancreas 8/9/2021 Yes    Sepsis ([address] 8/9/2021 Yes    Atelectasis of left lung 8/9/2021 Yes Plan:        1. Recurrent pancreatitis  2. Peripancreatic fluid collection   - ID on board   - pancreatic drain unplugged 8/9  - conitnue zosyn per ID recs until 8/15 with change to cipro on d/c   - GI planning on ERCP and stent today  - GI consulted due to above   - continue IVF   - started on creon TID  - replace magnesium  - lipase improved      3. Hx of smoking    4. COPD  - Continue inhalers, encourage smoking cessation.   - Keep oxygen saturation above 88%     5. Seizure history     - continue tegretol 200 mg TID  - seizure precautions      6. Anxiety  - Buspar 30 mg BID     7. GERD  - continue protonix     8. EtOH abuse history  - Has not had alcohol since discharge per patient. - Continue multivitamin, thiamin, folic acid     9. Iron deficinecy anemia  - Continue Ferrous sulfate     10.  Myalgia  - continue baclofen    Amy Pack MD  8/11/2021  7:41 AM

## 2021-08-11 NOTE — ANESTHESIA POSTPROCEDURE EVALUATION
Department of Anesthesiology  Postprocedure Note    Patient: Cinda Wilkerson  MRN: [id number]  YOB: 1970  Date of evaluation: 8/11/2021  Time:  4:41 PM     Procedure Summary     Date: 08/11/21 Room / Location: [address] [place] [address]    Anesthesia Start: 5810 Anesthesia Stop: 1909    Procedures:       ERCP STENT INSERTION (N/A )      ERCP SPHINCTER/PAPILLOTOMY Diagnosis: (pancreatic duct stent)    Surgeons: Mahogany Maria MD Responsible Provider: Moon Martinez MD    Anesthesia Type: general ASA Status: 3          Anesthesia Type: general    Marcell Phase I: Marcell Score: 8    Marcell Phase II:      Last vitals: Reviewed and per EMR flowsheets.        Anesthesia Post Evaluation    Patient location during evaluation: PACU  Patient participation: complete - patient participated  Level of consciousness: awake and alert  Pain score: 0  Airway patency: patent  Nausea & Vomiting: no nausea and no vomiting  Complications: no  Cardiovascular status: hemodynamically stable  Respiratory status: nasal cannula  Hydration status: euvolemic

## 2021-08-11 NOTE — OP NOTE
ERCP      Patient:   Crystal Linares   :    1970  Acc#:    [id number]   Referring/PCP: ARTI Clayton Skiff, MD  Facility:   [facility]  Date:     2021   Endoscopist:  Mira Gutiérrez MD, [facility]    Procedure: ERCP with PD stent placement        Indication: Suspected PD leak with peripancreatic fluid colection    Postprocedure diagnosis: Dilated irregular PD likely from chronic pancreatitis with abrupt cutoff. No obvious leak. Anesthesia:  General     EBL: None from the procedure    Specimen: None    Description of Procedure:  Prior to the procedure, a history and physical exam was performed and informed consent was obtained. The risks were discussed including pancreatitis, bleeding, and perforation. After the patient was placed in the prone position eved, the therapeutic duodenoscope scope was inserted into the mouth and advanced to the second portion of the duodenum allowing the papilla to be visualized. Using the a wire guided approach with the sphincterotome, the PD was cannulated and a pancreatogram was performed. Findings: The initial pancreatogram revealed dilated irregular PD likely from chronic pancreatitis with abrupt cutoff. No obvious leak seen in the prone position. PD diameter was 2 mm in head and about 4-5 mm in body and tail of pancreas. A 5 mm sphincterotomy was performed. A 5 fr- 9 cm single pigtail with internal flange was placed in PD. The stent was in good position both endoscopically and on fluoroscopy. The duodenoscope was removed and the patient tolerated the procedure well. The common bile duct was not manipulated during the procedure. Plan:  EUS in 4-6 weeks with PD stent removal.   Low fat diet.      Electronically signed by Mira Gutiérrez MD, FACG on 2021 at 2:52 PM

## 2021-08-12 VITALS
SYSTOLIC BLOOD PRESSURE: 102 MMHG | DIASTOLIC BLOOD PRESSURE: 81 MMHG | RESPIRATION RATE: 18 BRPM | TEMPERATURE: 99 F | OXYGEN SATURATION: 93 % | WEIGHT: 130.07 LBS | HEIGHT: 65 IN | BODY MASS INDEX: 21.67 KG/M2 | HEART RATE: 96 BPM

## 2021-08-12 LAB
ABSOLUTE EOS #: 0.19 K/UL (ref 0–0.44)
ABSOLUTE IMMATURE GRANULOCYTE: 0.04 K/UL (ref 0–0.3)
ABSOLUTE LYMPH #: 1.09 K/UL (ref 1.1–3.7)
ABSOLUTE MONO #: 0.73 K/UL (ref 0.1–1.2)
ANION GAP SERPL CALCULATED.3IONS-SCNC: 16 MMOL/L (ref 9–17)
BASOPHILS # BLD: 0 % (ref 0–2)
BASOPHILS ABSOLUTE: <0.03 K/UL (ref 0–0.2)
BUN BLDV-MCNC: 3 MG/DL (ref 6–20)
BUN/CREAT BLD: ABNORMAL (ref 9–20)
CALCIUM SERPL-MCNC: 8.3 MG/DL (ref 8.6–10.4)
CHLORIDE BLD-SCNC: 106 MMOL/L (ref 98–107)
CO2: 23 MMOL/L (ref 20–31)
CREAT SERPL-MCNC: 0.51 MG/DL (ref 0.5–0.9)
DIFFERENTIAL TYPE: ABNORMAL
EOSINOPHILS RELATIVE PERCENT: 2 % (ref 1–4)
GFR AFRICAN AMERICAN: >60 ML/MIN
GFR NON-AFRICAN AMERICAN: >60 ML/MIN
GFR SERPL CREATININE-BSD FRML MDRD: ABNORMAL ML/MIN/{1.73_M2}
GFR SERPL CREATININE-BSD FRML MDRD: ABNORMAL ML/MIN/{1.73_M2}
GLUCOSE BLD-MCNC: 78 MG/DL (ref 70–99)
HCT VFR BLD CALC: 27.2 % (ref 36.3–47.1)
HEMOGLOBIN: 8.5 G/DL (ref 11.9–15.1)
IMMATURE GRANULOCYTES: 1 %
LYMPHOCYTES # BLD: 12 % (ref 24–43)
MAGNESIUM: 1.6 MG/DL (ref 1.6–2.6)
MCH RBC QN AUTO: 29.5 PG (ref 25.2–33.5)
MCHC RBC AUTO-ENTMCNC: 31.3 G/DL (ref 28.4–34.8)
MCV RBC AUTO: 94.4 FL (ref 82.6–102.9)
MONOCYTES # BLD: 8 % (ref 3–12)
NRBC AUTOMATED: 0 PER 100 WBC
PDW BLD-RTO: 14.9 % (ref 11.8–14.4)
PLATELET # BLD: 672 K/UL (ref 138–453)
PLATELET ESTIMATE: ABNORMAL
PMV BLD AUTO: 9.8 FL (ref 8.1–13.5)
POTASSIUM SERPL-SCNC: 3.3 MMOL/L (ref 3.7–5.3)
RBC # BLD: 2.88 M/UL (ref 3.95–5.11)
RBC # BLD: ABNORMAL 10*6/UL
SEG NEUTROPHILS: 77 % (ref 36–65)
SEGMENTED NEUTROPHILS ABSOLUTE COUNT: 6.69 K/UL (ref 1.5–8.1)
SODIUM BLD-SCNC: 145 MMOL/L (ref 135–144)
WBC # BLD: 8.8 K/UL (ref 3.5–11.3)
WBC # BLD: ABNORMAL 10*3/UL

## 2021-08-12 PROCEDURE — 6370000000 HC RX 637 (ALT 250 FOR IP): Performed by: INTERNAL MEDICINE

## 2021-08-12 PROCEDURE — 80048 BASIC METABOLIC PNL TOTAL CA: CPT

## 2021-08-12 PROCEDURE — 6360000002 HC RX W HCPCS: Performed by: INTERNAL MEDICINE

## 2021-08-12 PROCEDURE — 36415 COLL VENOUS BLD VENIPUNCTURE: CPT

## 2021-08-12 PROCEDURE — 2700000000 HC OXYGEN THERAPY PER DAY

## 2021-08-12 PROCEDURE — 99232 SBSQ HOSP IP/OBS MODERATE 35: CPT | Performed by: INTERNAL MEDICINE

## 2021-08-12 PROCEDURE — 83735 ASSAY OF MAGNESIUM: CPT

## 2021-08-12 PROCEDURE — APPSS45 APP SPLIT SHARED TIME 31-45 MINUTES: Performed by: INTERNAL MEDICINE

## 2021-08-12 PROCEDURE — 85025 COMPLETE CBC W/AUTO DIFF WBC: CPT

## 2021-08-12 PROCEDURE — 2580000003 HC RX 258: Performed by: INTERNAL MEDICINE

## 2021-08-12 PROCEDURE — 99239 HOSP IP/OBS DSCHRG MGMT >30: CPT | Performed by: FAMILY MEDICINE

## 2021-08-12 PROCEDURE — 94640 AIRWAY INHALATION TREATMENT: CPT

## 2021-08-12 RX ADMIN — Medication 100 MG: at 08:21

## 2021-08-12 RX ADMIN — MORPHINE SULFATE 1 MG: 2 INJECTION, SOLUTION INTRAMUSCULAR; INTRAVENOUS at 09:39

## 2021-08-12 RX ADMIN — FERROUS SULFATE TAB EC 325 MG (65 MG FE EQUIVALENT) 325 MG: 325 (65 FE) TABLET DELAYED RESPONSE at 08:22

## 2021-08-12 RX ADMIN — SODIUM CHLORIDE, PRESERVATIVE FREE 10 ML: 5 INJECTION INTRAVENOUS at 08:22

## 2021-08-12 RX ADMIN — ONDANSETRON 4 MG: 2 INJECTION INTRAMUSCULAR; INTRAVENOUS at 14:12

## 2021-08-12 RX ADMIN — PANTOPRAZOLE SODIUM 40 MG: 40 TABLET, DELAYED RELEASE ORAL at 08:22

## 2021-08-12 RX ADMIN — PIPERACILLIN AND TAZOBACTAM 3375 MG: 3; .375 INJECTION, POWDER, FOR SOLUTION INTRAVENOUS at 02:54

## 2021-08-12 RX ADMIN — BUSPIRONE HYDROCHLORIDE 30 MG: 15 TABLET ORAL at 16:57

## 2021-08-12 RX ADMIN — AMLODIPINE BESYLATE 5 MG: 5 TABLET ORAL at 08:22

## 2021-08-12 RX ADMIN — PANCRELIPASE 36000 UNITS: 24000; 76000; 120000 CAPSULE, DELAYED RELEASE PELLETS ORAL at 08:22

## 2021-08-12 RX ADMIN — BUDESONIDE AND FORMOTEROL FUMARATE DIHYDRATE 2 PUFF: 160; 4.5 AEROSOL RESPIRATORY (INHALATION) at 09:44

## 2021-08-12 RX ADMIN — PANCRELIPASE 36000 UNITS: 24000; 76000; 120000 CAPSULE, DELAYED RELEASE PELLETS ORAL at 16:57

## 2021-08-12 RX ADMIN — MORPHINE SULFATE 1 MG: 2 INJECTION, SOLUTION INTRAMUSCULAR; INTRAVENOUS at 13:45

## 2021-08-12 RX ADMIN — PIPERACILLIN AND TAZOBACTAM 3375 MG: 3; .375 INJECTION, POWDER, FOR SOLUTION INTRAVENOUS at 12:02

## 2021-08-12 RX ADMIN — TOPIRAMATE 50 MG: 50 TABLET, FILM COATED ORAL at 08:22

## 2021-08-12 RX ADMIN — CARBAMAZEPINE 200 MG: 200 TABLET ORAL at 14:09

## 2021-08-12 RX ADMIN — CARBAMAZEPINE 200 MG: 200 TABLET ORAL at 08:22

## 2021-08-12 RX ADMIN — BUSPIRONE HYDROCHLORIDE 30 MG: 15 TABLET ORAL at 08:22

## 2021-08-12 RX ADMIN — PANCRELIPASE 36000 UNITS: 24000; 76000; 120000 CAPSULE, DELAYED RELEASE PELLETS ORAL at 12:08

## 2021-08-12 RX ADMIN — SODIUM CHLORIDE, POTASSIUM CHLORIDE, SODIUM LACTATE AND CALCIUM CHLORIDE: 600; 310; 30; 20 INJECTION, SOLUTION INTRAVENOUS at 12:02

## 2021-08-12 RX ADMIN — PREGABALIN 200 MG: 100 CAPSULE ORAL at 08:21

## 2021-08-12 RX ADMIN — BACLOFEN 10 MG: 10 TABLET ORAL at 14:09

## 2021-08-12 RX ADMIN — PREGABALIN 200 MG: 100 CAPSULE ORAL at 14:09

## 2021-08-12 RX ADMIN — FOLIC ACID 1 MG: 1 TABLET ORAL at 08:22

## 2021-08-12 RX ADMIN — BACLOFEN 10 MG: 10 TABLET ORAL at 08:22

## 2021-08-12 ASSESSMENT — ENCOUNTER SYMPTOMS
STRIDOR: 0
EYE PAIN: 0
COLOR CHANGE: 0
BLOOD IN STOOL: 0
PHOTOPHOBIA: 0
ABDOMINAL PAIN: 1
ABDOMINAL DISTENTION: 0
EYE ITCHING: 0
APNEA: 0
FACIAL SWELLING: 0

## 2021-08-12 ASSESSMENT — PAIN DESCRIPTION - LOCATION
LOCATION: ABDOMEN
LOCATION: ABDOMEN

## 2021-08-12 ASSESSMENT — PAIN SCALES - GENERAL
PAINLEVEL_OUTOF10: 8
PAINLEVEL_OUTOF10: 7
PAINLEVEL_OUTOF10: 8
PAINLEVEL_OUTOF10: 7
PAINLEVEL_OUTOF10: 8
PAINLEVEL_OUTOF10: 0

## 2021-08-12 ASSESSMENT — PAIN DESCRIPTION - PAIN TYPE
TYPE: ACUTE PAIN
TYPE: ACUTE PAIN

## 2021-08-12 NOTE — TELEPHONE ENCOUNTER
Pt's mom, Anastasiia Vines, left msg on ofc vm 8/12/21 @ 4:09pm wanting to sched EUS w/ Dr Xavi Khalil. Return phone call to [phone]. Message being forwarded to clinical staff to have dr viveros due to pt had an ERCP w/ stent placement on 8/11/21 by Goldy Acosta.  Dr Star Sarmiento recommendation is:   EUS in 4-6 weeks with PD stent removal.  Dr Xavi Khalil agreeable to time frame and procedure?

## 2021-08-12 NOTE — CARE COORDINATION
Met with patient and family at bedside. I told her our plan was depending on the possibility of her still requiring IV abx. She states she'd like to go to [facility] if she still need IV abx rather than pursuing HC and home IV abx. VANESSA VELAZQUEZ Aouad for plan. 1220 Patient qualifying for home oxygen at 2L; VANESSA VELAZQUEZ for order and face to face. 1510 Met with patient at bedside. She states she does not need HC due to her nurse educating her on there drain. She states she has no issue going home and has good support. 1530 Forms faxed to [phone] [facility] confirmed plan for oxygen delivery. 1810 Home oxygen at bedside and nurse providing supplies for patient to do drain care at home.      Discharge [facility] Case Management Department  Written by: Kenny Martino RN    Patient Name: Edelmira Colon  Attending Provider: Pedro Luis Jimenez MD  Admit Date: 2021  4:33 AM  MRN: [id number]  Account: [de-identified]                     : 1970  Discharge Date: 21      Disposition: home with help from mother    Kenny Martino RN

## 2021-08-12 NOTE — PROGRESS NOTES
Both patient and mom was educated on drain care and how to flush drain. Writer cleansed around drain and applied new drain guaze with paper tape. Writer demonstrated and had patient do a return demonstration on flushing drain. All questions answered. Supplies given.

## 2021-08-12 NOTE — PROGRESS NOTES
patch Transdermal Daily    pregabalin  200 mg Oral TID    topiramate  50 mg Oral BID    vitamin B-1  100 mg Oral Daily    sodium chloride flush  5-40 mL Intravenous 2 times per day    [Held by provider] enoxaparin  40 mg Subcutaneous Daily    pantoprazole  40 mg Oral QAM AC    piperacillin-tazobactam  3,375 mg Intravenous Q8H    traZODone  50 mg Oral Nightly     Continuous Infusions:    sodium chloride      lactated ringers 100 mL/hr at 08/12/21 1202     PRN Meds: albuterol sulfate HFA, hydrOXYzine, nicotine polacrilex, sodium chloride, sodium chloride flush, sodium chloride, ondansetron **OR** ondansetron, magnesium hydroxide, acetaminophen **OR** acetaminophen, potassium chloride **OR** potassium alternative oral replacement **OR** potassium chloride, magnesium sulfate, morphine    Data:     Past Medical History:   has a past medical history of Acute respiratory failure with hypoxia ([address] Alcohol withdrawal syndrome, with delirium ([address] Alcoholism ([address] Anemia, 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 Social History:   reports that she has been smoking cigarettes. She has a 30.00 pack-year smoking history. She has never used smokeless tobacco. She reports previous alcohol use. She reports current drug use. Frequency: 2.00 times per week. Drug: Marijuana.      Family History:   Family History   Problem Relation Age of Onset    Other Father     Cancer Maternal Grandmother     Heart Disease Paternal Grandmother    Jeannie Manual Esophageal Cancer Maternal Aunt        Vitals:  /80   Pulse 88   Temp 97.4 °F (36.3 °C) (Oral)   Resp 15   Ht 5' 5\" (1.651 m)   Wt 130 lb 1.1 oz (59 kg)   SpO2 92%   BMI 21.64 kg/m²   Temp (24hrs), Av.8 °F (36 °C), Min:96.2 °F (35.7 °C), Max:98.4 °F (36.9 °C)    No results for input(s): POCGLU in the last 72 hours. I/O (24Hr): Intake/Output Summary (Last 24 hours) at 2021 1322  Last data filed at 2021 1256  Gross per 24 hour   Intake 1370 ml   Output 1180 ml   Net 190 ml       Labs:  Hematology:  Recent Labs     08/10/21  0549 21  0527 21  0637   WBC 8.8 10.4 8.8   RBC 2.86* 2.83* 2.88*   HGB 8.3* 8.3* 8.5*   HCT 28.0* 27.3* 27.2*   MCV 97.9 96.5 94.4   MCH 29.0 29.3 29.5   MCHC 29.6 30.4 31.3   RDW 14.6* 14.5* 14.9*   * 757* 672*   MPV 9.6 9.9 9.8     Chemistry:  Recent Labs     08/10/21  0549 21  0527 21  0637    137 145*   K 3.6* 3.5* 3.3*    101 106   CO2 22 20 23   GLUCOSE 86 85 78   BUN 5* 4* 3*   CREATININE 0.55 0.63 0.51   MG  --  1.5* 1.6   ANIONGAP 12 16 16   LABGLOM >60 >60 >60   GFRAA >60 >60 >60   CALCIUM 8.2* 8.3* 8.3*     Recent Labs     08/10/21  0549   LIPASE 72*     ABG:  Lab Results   Component Value Date    POCPH 7.467 2021    POCPCO2 44.7 2021    POCPO2 71.9 2021    POCHCO3 32.3 2021    NBEA NOT REPORTED 2021    PBEA 8 2021    LGI3TDS NOT REPORTED 2021    WMRR4XMX 95 2021    FIO2 INFORMATION NOT PROVIDED 2021     Lab Results   Component Value Date/Time    SPECIAL LEFT FA 10 ML 2021 05:15 AM     Lab Results   Component Value Date/Time    CULTURE NO GROWTH 3 DAYS 2021 05:15 AM       Radiology:  CT ABDOMEN PELVIS W IV CONTRAST Additional Contrast? None    Result Date: 2021  1.  Approximately 50% decrease in size of the septated fluid collection in the left upper quadrant, with residual components measuring up to 7 x 5 cm with indwelling percutaneous drain. 2. New area of adjacent edema within the mesentery at the splenic flexure of the colon has developed without additional abscess or fluid collection. 3. Persistent inflammatory changes in the splenic flexure of the colon, similar to the prior exam.  These could be reactive to the adjacent fluid collection. 4. Left lower lobe atelectasis. Mucous plugging in the right lower lobe segmental bronchi. 5. Subacute T12 and L5 compression fractures with 25% vertebral body height loss. XR CHEST PORTABLE    Result Date: 8/9/2021  1. Interval decreased bibasilar ill-defined consolidation suggesting pneumonia. 2. Interval decreased volume of mild bilateral pleural effusions. Physical Examination:        General appearance:  alert, cooperative and no distress  Mental Status:  oriented to person, place and time  Lungs:  clear to auscultation bilaterally, normal effort  Heart:  regular rate and rhythm, no murmur  Abdomen:  soft, TTP, nondistended, normal bowel sounds, drain in place  Extremities:  no edema, redness, tenderness in the calves  Skin:  no gross lesions, rashes, induration    Assessment:        Hospital Problems         Last Modified POA    * (Principal) Acute recurrent pancreatitis 8/9/2021 Yes    Essential hypertension 8/9/2021 Yes    Nicotine addiction 8/9/2021 Yes    Anxiety 8/9/2021 Yes    Tobacco use 8/9/2021 Yes    Seizure disorder ([address] 8/9/2021 Yes    Acute respiratory failure with hypoxia ([address] 8/9/2021 Yes    Pancreatic cyst 8/9/2021 Yes    Recurrent major depressive disorder ([address] 8/9/2021 Yes    AMAN (generalized anxiety disorder) 8/9/2021 Yes    Chronic pancreatitis ([address] 8/9/2021 Yes    Pseudocyst of pancreas 8/9/2021 Yes    Sepsis ([address] 8/9/2021 Yes    Atelectasis of left lung 8/9/2021 Yes          Plan:        1. Recurrent pancreatitis  2.  Peripancreatic fluid collection   - ID on board   - pancreatic drain unplugged 8/9  - conitnue zosyn per ID recs until 8/15 with change to cipro on d/c   - GI completed ERCP on 8/11, cleared for d/c   - GI consulted due to above   - started on creon TID  - replace magnesium  - lipase improved   - pt cleared for d/c however does not feel ready to go home. PT/OT on board. Will monitor patient overnight and plan for d/c in am      3.  Hx of smoking    4. COPD  - Continue inhalers, encourage smoking cessation.   - Keep oxygen saturation above 88%     5. Seizure history     - continue tegretol 200 mg TID  - seizure precautions      6. Anxiety  - Buspar 30 mg BID     7. GERD  - continue protonix     8. EtOH abuse history  - Has not had alcohol since discharge per patient.   - Continue multivitamin, thiamin, folic acid     9. Iron deficinecy anemia  - Continue Ferrous sulfate     10. Myalgia  - continue baclofen    Tony Lujan MD  8/12/2021  1:22 PM

## 2021-08-12 NOTE — PROGRESS NOTES
Sussy Loaiza. [facility] Gastroenterology Progress Note    Abran Yeboah is a 45 y.o. female patient. Hospitalization Day:3      Chief consult reason:       Subjective: Patient seen and examined. Patient reports overall feeling better. Patient continues to go outside and smoke cigarettes. Patient underwent ER CP with PD stent placement yesterday. Left upper quadrant drain with decreasing output over the past 24 hours. (240ml ---> 65ml)       Objective:   VITALS:  /71   Pulse 80   Temp 97.9 °F (36.6 °C) (Oral)   Resp 11   Ht 5' 5\" (1.651 m)   Wt 130 lb 1.1 oz (59 kg)   SpO2 97%   BMI 21.64 kg/m²   TEMPERATURE:  Current - Temp: 97.9 °F (36.6 °C); Max - Temp  Av.8 °F (36 °C)  Min: 96.2 °F (35.7 °C)  Max: 98.4 °F (36.9 °C)    Physical Assessment:  General appearance:  alert, cooperative and no distress  Mental Status:  oriented to person, place and time and normal affect  Lungs:  clear to auscultation bilaterally, normal effort  Heart:  regular rate and rhythm, no murmur  Abdomen:  soft, LUQ tenderness, nondistended, normal bowel sounds.   Left upper quadrant drain with small amount of medium green-colored drainage  Extremities:  no edema, no redness, No clubbing  Skin:  warm, dry, no gross lesions or rashes    CURRENT MEDICATIONS:  Scheduled Meds:   lipase-protease-amylase  36,000 Units Oral TID WC    amLODIPine  5 mg Oral Daily    baclofen  10 mg Oral TID    budesonide-formoterol  2 puff Inhalation BID    busPIRone  30 mg Oral BID WC    carBAMazepine  200 mg Oral TID    ferrous sulfate  325 mg Oral BID    folic acid  1 mg Oral Daily    nicotine  1 patch Transdermal Daily    pregabalin  200 mg Oral TID    topiramate  50 mg Oral BID    vitamin B-1  100 mg Oral Daily    sodium chloride flush  5-40 mL Intravenous 2 times per day    [Held by provider] enoxaparin  40 mg Subcutaneous Daily    pantoprazole  40 mg Oral QAM AC    piperacillin-tazobactam  3,375 mg Intravenous Q8H    traZODone 50 mg Oral Nightly     Continuous Infusions:   sodium chloride      lactated ringers 100 mL/hr at 08/10/21 0705     PRN Meds:albuterol sulfate HFA, hydrOXYzine, nicotine polacrilex, sodium chloride, sodium chloride flush, sodium chloride, ondansetron **OR** ondansetron, magnesium hydroxide, acetaminophen **OR** acetaminophen, potassium chloride **OR** potassium alternative oral replacement **OR** potassium chloride, magnesium sulfate, morphine      Data Review:  LABS and IMAGING:     CBC  Recent Labs     08/10/21  0549 08/11/21  0527 08/12/21  0637   WBC 8.8 10.4 8.8   HGB 8.3* 8.3* 8.5*   HCT 28.0* 27.3* 27.2*   MCV 97.9 96.5 94.4   MCHC 29.6 30.4 31.3   RDW 14.6* 14.5* 14.9*   * 757* 672*       Immature PLTs   Lab Results   Component Value Date    PLTFLUORE 802 08/09/2021    PLTFLUORE 129 07/10/2020       ANEMIA STUDIES  No results for input(s): LABIRON, TIBC, IRON, FERRITIN, XHISDDQM05, FOLATE, OCCULTBLD in the last 72 hours. BMP  Recent Labs     08/10/21  0549 08/11/21  0527 08/12/21  0637    137 145*   K 3.6* 3.5* 3.3*    101 106   CO2 22 20 23   BUN 5* 4* 3*   CREATININE 0.55 0.63 0.51   GLUCOSE 86 85 78   CALCIUM 8.2* 8.3* 8.3*       LFTS  No results for input(s): ALKPHOS, ALT, AST, BILITOT, BILIDIR, LABALBU in the last 72 hours.     AMYLASE/LIPASE/AMMONIA  Recent Labs     08/10/21  0549   LIPASE 72*       Acute Hepatitis Panel   Lab Results   Component Value Date    HEPBSAG NONREACTIVE 07/14/2019    HEPCAB NONREACTIVE 07/14/2019    HEPBIGM NONREACTIVE 07/14/2019    HEPAIGM NONREACTIVE 07/14/2019       HCV Genotype   No results found for: HEPATITISCGENOTYPE    HCV Quantitative   No results found for: HCVQNT    LIVER WORK UP:    AFP  Lab Results   Component Value Date    AFP 7.3 07/21/2021       Alpha 1 antitrypsin   No results found for: A1A    Anti - Liver/Kidney Ab  No results found for: LIVER-KIDNEYMICROSOMALAB    BRADLEY  Lab Results   Component Value Date    BRADLEY NEGATIVE 04/13/2021 AMA  No results found for: MITOAB    ASMA  No results found for: SMOOTHMUSCAB    Ceruloplasmin  No results found for: CERULOPLSM    Celiac panel  No results found for: TISSTRNTIIGG, TTGIGA, IGA    IgG  No results found for: IGG    IgM  No results found for: IGM    GGT   Lab Results   Component Value Date    LABGGT 318 07/22/2021       PT/INR  No results for input(s): PROTIME, INR in the last 72 hours. Cancer Markers:  CEA:  No results for input(s): CEA in the last 72 hours. Ca 125:  No results for input(s):  in the last 72 hours. Ca 19-9:   No results for input(s):  in the last 72 hours. AFP: No results for input(s): AFP in the last 72 hours. Lactic acid:No results for input(s): LACTACIDWB in the last 72 hours. Radiology Review:    CT ABDOMEN PELVIS W IV CONTRAST Additional Contrast? None    Result Date: 8/11/2021  EXAMINATION: CT OF THE ABDOMEN AND PELVIS WITH CONTRAST, 8/9/2021 6:31 am TECHNIQUE: CT of the abdomen and pelvis was performed with the administration of intravenous contrast. Multiplanar reformatted images are provided for review. Dose modulation, iterative reconstruction, and/or weight based adjustment of the mA/kV was utilized to reduce the radiation dose to as low as reasonably achievable. COMPARISON: 07/30/2021 HISTORY: ORDERING SYSTEM PROVIDED HISTORY:  Abd pain, percutaneous pancreatic tube appears infected. TECHNOLOGIST PROVIDED HISTORY: Abd pain, percutaneous pancreatic tube appears infected. Decision Support Exception - unselect if not a suspected or confirmed emergency medical condition->Emergency Medical Condition (MA) Reason for Exam:  Abd pain Acuity:  Unknown Type of Exam:  Unknown FINDINGS: Lower Chest: Left lower lobe atelectasis. Mucous plugging in the right lower lobe segmental bronchi. Organs: The liver, spleen, adrenal glands, gallbladder and kidneys demonstrate no acute abnormality. No hydronephrosis.  Diffuse pancreatic atrophy with multiple calcifications compatible with sequela of chronic pancreatitis. There is mild dilation of the main pancreatic duct in the pancreatic head and body up to 3 mm. No dilation of the common bile duct. GI/Bowel: Edema is present around the splenic flexure of the colon with mild wall thickening, similar to the prior exam. Normal appendix. No other dilated or inflamed loops of bowel. Pelvis: No pelvic mass, adenopathy, or fluid collection. Peritoneum/Retroperitoneum: In the left upper quadrant adjacent to the spleen and tail of the pancreas and hepatic flexure of the colon, there is a percutaneous drain within septated complex rim enhancing fluid collection with the largest component measuring to 7 x 5 cm in axial dimension, previously 9.5 x 7 cm. The collection has decreased in size by least 50%. The collection extends around the hepatic flexure of the colon. Edema has developed medial to the hepatic flexure of the colon which is new from the prior exam.  No additional fluid collection has developed. No free intraperitoneal air. Mesenteric vasculature is patent. The splenic vein is patent. Bones/Soft Tissues: Similar to 07/27/2021 but new compared with 02/12/2021, there are subacute compression fractures at T12 and L5 with 25% vertebral body height loss. A chronic T11 compression fracture is present. 1. Approximately 50% decrease in size of the septated fluid collection in the left upper quadrant, with residual components measuring up to 7 x 5 cm with indwelling percutaneous drain. 2. New area of adjacent edema within the mesentery at the splenic flexure of the colon has developed without additional abscess or fluid collection. 3. Persistent inflammatory changes in the splenic flexure of the colon, similar to the prior exam.  These could be reactive to the adjacent fluid collection. 4. Left lower lobe atelectasis. Mucous plugging in the right lower lobe segmental bronchi.  5. Subacute T12 and L5 compression fractures with 25% vertebral body height loss. MRI ABDOMEN W WO CONTRAST MRCP    Result Date: 7/31/2021  EXAMINATION: MRI OF THE ABDOMEN WITH AND WITHOUT CONTRAST AND MRCP 7/31/2021     1. Suggestion of interstitial edematous pancreatitis in the pancreatic tail, consistent with acute on chronic pancreatitis given the CT appearance. 2. Approximately 13.8 cm x 6.5 cm x 2.3 cm heterogeneous collection in the left subphrenic space. Moderate fat necrosis could have this appearance, but the affected area is outside the pancreas with no findings of necrotizing pancreatitis. Additionally, the finding was absent less than 4 weeks prior. There is suspicion for continuity of the lesion with the main pancreatic duct in the tail, although this is incompletely evaluated due to only incomplete inclusion of the affected area on the 3D MRCP sequence. This suggest the possibility of fat necrosis from leaked pancreatic fluid. Abscess is an additional consideration. 3. Mild dilation of the upstream pancreatic duct with abrupt caliber change in the pancreatic neck potentially due to a filling defect such as calcification as seen on CT or a stricture. There are no findings suggestive of an obstructing malignancy. 4. Mild intrahepatic and extrahepatic biliary dilation of indeterminate etiology with no findings of choledocholithiasis. Consider ERCP if there are clinical findings of cholestasis. 5. Left para-aortic lymphadenopathy is likely reactive. Recommend attention on follow-up imaging. ENDOSCOPY     Date:                           8/11/2021   Endoscopist:             Kim Butterfield MD, [facility]     Procedure: ERCP with PD stent placement                                                    Indication: Suspected PD leak with peripancreatic fluid colection     Postprocedure diagnosis: Dilated irregular PD likely from chronic pancreatitis with abrupt cutoff. No obvious leak. Findings:   The initial pancreatogram revealed dilated irregular PD likely from chronic pancreatitis with abrupt cutoff. No obvious leak seen in the prone position. PD diameter was 2 mm in head and about 4-5 mm in body and tail of pancreas.     A 5 mm sphincterotomy was performed. A 5 fr- 9 cm single pigtail with internal flange was placed in PD. The stent was in good position both endoscopically and on fluoroscopy.      The duodenoscope was removed and the patient tolerated the procedure well. The common bile duct was not manipulated during the procedure.        Plan:  EUS in 4-6 weeks with PD stent removal.   Low fat diet.      Electronically signed by Airam Alfonso MD, FACG on 8/11/2021 at 2:52 PM    Principal Problem:    Acute recurrent pancreatitis  Active Problems:    Essential hypertension    Nicotine addiction    Anxiety    Tobacco use    Seizure disorder ([address]    Acute respiratory failure with hypoxia (HCC)    Pancreatic cyst    Recurrent major depressive disorder (HCC)    AMAN (generalized anxiety disorder)    Chronic pancreatitis ([address]    Pseudocyst of pancreas    Sepsis ([address]    Atelectasis of left lung  Resolved Problems:    * No resolved hospital problems. *       GI Assessment:    1. Chronic alcoholic pancreatitis with PD stricture - 08/11/2021 -S/P ERCP with PD stent placement  2. Left upper quadrant abscess  S/p drain placement 07/28/2021 - admitted with blocked drain  3. Leukocytosis -improved on current antibiotics  4. Left sided abdominal pain secondary to blocked abscess drain  5. Tobacco use disorder -ongoing tobacco use. 6. History of alcohol use disorder. Last drink 12/2020  7. Chronic diarrhea -? Pancreatic insufficiency      Plan of care:  1. Low-fat diet  2. Patient will need EUS in 4 to 6 weeks for PD stent removal.   3. Patient to follow-up in GI office for ongoing management of pancreatitis. Will likely need CT abdomen and pelvis in a couple weeks to help determine when left upper quadrant drain can be removed. 4. Strongly advise tobacco cessation. 5. GI will sign off. - Above discussed with patient as well as her mother    Please feel free to contact me with any questions or concerns. Thank you for allowing me to participate in the care of your patient. LOI Lozada - CNP on 8/12/2021 at 11:05 [address] Gastroenterology    Please note that this note was generated using a voice recognition dictation software. Although every effort was made to ensure the accuracy of this automated transcription, some errors in transcription may have occurred.

## 2021-08-12 NOTE — PLAN OF CARE
Problem: Nutritional:  Goal: Nutritional status will improve  Description: Nutritional status will improve  8/12/2021 1758 by Ty Acevedo RN  Outcome: Completed  8/12/2021 1454 by Ty Acevedo RN  Outcome: Ongoing  8/12/2021 0420 by Leon Wood RN  Outcome: Ongoing     Problem: Physical Regulation:  Goal: Diagnostic test results will improve  Description: Diagnostic test results will improve  8/12/2021 1758 by Ty Acevedo RN  Outcome: Completed  8/12/2021 1454 by Ty Acevedo RN  Outcome: Ongoing  8/12/2021 0420 by Leno Wood RN  Outcome: Ongoing  Goal: Will remain free from infection  Description: Will remain free from infection  8/12/2021 1758 by Ty Acevedo RN  Outcome: Completed  8/12/2021 1454 by Ty Acevedo RN  Outcome: Ongoing  8/12/2021 0420 by Leon Wood RN  Outcome: Ongoing  Goal: Ability to maintain vital signs within normal range will improve  Description: Ability to maintain vital signs within normal range will improve  8/12/2021 1758 by Ty Acevedo RN  Outcome: Completed  8/12/2021 1454 by Ty Acevedo RN  Outcome: Ongoing  8/12/2021 0420 by Leon Wood RN  Outcome: Ongoing     Problem: Respiratory:  Goal: Ability to maintain normal respiratory secretions will improve  Description: Ability to maintain normal respiratory secretions will improve  8/12/2021 1758 by Ty Acevedo RN  Outcome: Completed  8/12/2021 1454 by Ty Acevedo RN  Outcome: Ongoing  8/12/2021 0420 by Leon Wood RN  Outcome: Ongoing     Problem: Skin Integrity:  Goal: Demonstration of wound healing without infection will improve  Description: Demonstration of wound healing without infection will improve  8/12/2021 1758 by Ty Acevedo RN  Outcome: Completed  8/12/2021 1454 by Ty Acevedo RN  Outcome: Ongoing  8/12/2021 0420 by Leon Wood RN  Outcome: Ongoing  Goal: Complications related to intravenous access or infusion will be avoided or minimized  Description: Complications related to intravenous access or infusion will be avoided or minimized  8/12/2021 1758 by Alan Wayne RN  Outcome: Completed  8/12/2021 1454 by Alan Wayne RN  Outcome: Ongoing  8/12/2021 0420 by Katina Maloney RN  Outcome: Ongoing  Goal: Will show no infection signs and symptoms  Description: Will show no infection signs and symptoms  8/12/2021 1758 by Alan Wayne RN  Outcome: Completed  8/12/2021 1454 by Alan Wayne RN  Outcome: Ongoing  8/12/2021 0420 by Katina Maloney RN  Outcome: Ongoing  Goal: Absence of new skin breakdown  Description: Absence of new skin breakdown  8/12/2021 1758 by Alan Wayne RN  Outcome: Completed  8/12/2021 1454 by Alan Wayne RN  Outcome: Ongoing  8/12/2021 0420 by Katina Maloney RN  Outcome: Ongoing     Problem: Falls - Risk of:  Goal: Will remain free from falls  Description: Will remain free from falls  8/12/2021 1758 by Alan Wayne RN  Outcome: Completed  8/12/2021 1454 by Alan Wayne RN  Outcome: Ongoing  8/12/2021 0420 by Katina Maloney RN  Outcome: Ongoing  Goal: Absence of physical injury  Description: Absence of physical injury  8/12/2021 1758 by Alan Wayne RN  Outcome: Completed  8/12/2021 1454 by Alan Wayne RN  Outcome: Ongoing  8/12/2021 0420 by Katina Maloney RN  Outcome: Ongoing     Problem: Pain:  Goal: Pain level will decrease  Description: Pain level will decrease  8/12/2021 1758 by Alan Wayne RN  Outcome: Completed  8/12/2021 1454 by Alan Wayne RN  Outcome: Ongoing  8/12/2021 0420 by Katina Maloney RN  Outcome: Ongoing  Goal: Control of acute pain  Description: Control of acute pain  8/12/2021 1758 by Alan Wayne RN  Outcome: Completed  8/12/2021 1454 by Alan Wayne RN  Outcome: Ongoing  8/12/2021 0420 by Katina Maloney RN  Outcome: Ongoing  Goal: Control of chronic pain  Description: Control of chronic pain  8/12/2021 1758 by Alan Wayne RN  Outcome: Completed  8/12/2021 1454 by Syed Buckley RN  Outcome: Ongoing  8/12/2021 0420 by Nicky Mai, RN  Outcome: Ongoing     Problem: Nutrition  Goal: Optimal nutrition therapy  8/12/2021 1758 by Syed Buckley RN  Outcome: Completed  8/12/2021 1454 by Syed Buckley RN  Outcome: Ongoing  8/12/2021 0420 by Nicky Mai, RN  Outcome: Ongoing

## 2021-08-12 NOTE — PROGRESS NOTES
Home Oxygen Evaluation    Home Oxygen Evaluation completed. Patient is on 3 liters per minute via Nasal Cannula. Resting SpO2 = 97%  Resting SpO2 on room air = 92%    SpO2 on room air with exercise = 85%  SpO2 on oxygen as above with exercise = 92%    Nocturnal Oximetry with patient on room air is recommended is SpO2 is between 89% and 95% (requires additional order).     Isabel Mosher RCP  9:52 AM

## 2021-08-12 NOTE — DISCHARGE SUMMARY
[facility]  Office: [address], DO, Bharati Edilia, DO, Nickie Rainey DO, Mc Steinbergjerald Huang, DO, Juan Carlos Weinberg MD, Karen Baum MD, Deny Walter MD, Werner Fonseca MD, Mckenzie Blandon MD, Louann Prieto MD, Mirella Trinidad MD, Jennifer Carrillo DO, Jayden Corbin MD, Jorge L Almaguer DO, Kaveh Cruz MD,  Mabel Natarajan DO, Nayeli Araujo MD, Alfredo Palacio MD, Franklyn Pino MD, Shabnam Rivera MD, Princess Dietz MD, Lashanda Pope MD, Lalitha Peter MD, [facility] Stacey, [facility], [facility], CNP, Sander Miramontes, CNP, Heena Rosas, CNS, Rebecca Cleary, CNP, Destiny Boyd, CNP, Ike Menezes, CNP, Karan Granados, CNP, Jagruti Lovell, CNP, Frantz Raman PA-C, Jose Adair, [facility], Saira Castellanos, CNP, Missy Torres, CNP, Derek Henley, CNP, Martina Villalta, CNP, Erick Koroma, CNP, Jaron Bennett, CNP, Faviola Cheema, CNP, Stacy Smith, [facility]    Discharge Summary     Patient ID: Yuliet Beverly  :  1970   MRN: [id number]     ACCOUNT:  [de-identified]   Patient's PCP: Brian Hudson MD  Admit Date: 2021   Discharge Date: 2021      Length of Stay: 3  Code Status:  Full Code  Admitting Physician: No admitting provider for patient encounter. Discharge Physician: Nayeli Araujo MD     Active Discharge Diagnoses:     Hospital Problem Lists:  Principal Problem:    Acute recurrent pancreatitis  Active Problems:    Essential hypertension    Nicotine addiction    Anxiety    Tobacco use    Seizure disorder (HCC)    Acute respiratory failure with hypoxia (HCC)    Pancreatic cyst    Recurrent major depressive disorder (HCC)    AMAN (generalized anxiety disorder)    Chronic pancreatitis ([facility] [address]    Pseudocyst of pancreas    Sepsis ([facility] [address]    Atelectasis of left lung  Resolved Problems:    * No resolved hospital problems.  *      Admission Condition:  stable     Discharged Condition: stable    Hospital Stay:     GILBERT ARAUJO Course:  Meg Cancino is a 45 y.o. female who was admitted for the management of    Acute recurrent pancreatitis , presented to ER with   Wound Check    1. Recurrent pancreatitis  2. Peripancreatic fluid collection   - ID on board   - pancreatic drain unplugged 8/9  - conitnue zosyn per ID recs until 8/15 with change to cipro on d/c   - GI completed ERCP on 8/11, cleared for d/c   - GI consulted due to above   - started on creon TID  - replace magnesium  - lipase improved   - pt cleared for d/c however does not feel ready to go home. PT/OT on board. Will monitor patient overnight and plan for d/c in am     PER GI  Plan of care: Low-fat diet  Patient will need EUS in 4 to 6 weeks for PD stent removal.   Patient to follow-up in GI office for ongoing management of pancreatitis. Will likely need CT abdomen and pelvis in a couple weeks to help determine when left upper quadrant drain can be removed. Strongly advise tobacco cessation. GI will sign off.     - Above discussed with patient as well as her mother     Please feel free to contact me with any questions or concerns. Thank you for allowing me to participate in the care of your patient.       LOI Sutton - CNP on 8/12/2021 at 11:05 [address] [place] [address] Gastroenterology     3.  Hx of smoking    4. COPD  - Continue inhalers, encourage smoking cessation.   - Keep oxygen saturation above 88%     5. Seizure history     - continue tegretol 200 mg TID  - seizure precautions      6. Anxiety  - Buspar 30 mg BID     7. GERD  - continue protonix     8. EtOH abuse history  - Has not had alcohol since discharge per patient.   - Continue multivitamin, thiamin, folic acid     9. Iron deficinecy anemia  - Continue Ferrous sulfate     10. Myalgia  - continue baclofen    Significant therapeutic interventions:      Significant Diagnostic Studies:   Labs / Micro:  CBC:   Lab Results   Component Value Date    WBC 8.8 08/12/2021    RBC 2.88 08/12/2021    RBC 4.16 04/30/2012    HGB 8.5 08/12/2021    HCT 27.2 08/12/2021    MCV 94.4 08/12/2021    MCH 29.5 08/12/2021    MCHC 31.3 08/12/2021    RDW 14.9 08/12/2021     08/12/2021     04/30/2012     BMP:    Lab Results   Component Value Date    GLUCOSE 78 08/12/2021    GLUCOSE 92 04/30/2012     08/12/2021    K 3.3 08/12/2021     08/12/2021    CO2 23 08/12/2021    ANIONGAP 16 08/12/2021    BUN 3 08/12/2021    CREATININE 0.51 08/12/2021    BUNCRER NOT REPORTED 08/12/2021    CALCIUM 8.3 08/12/2021    LABGLOM >60 08/12/2021    GFRAA >60 08/12/2021    GFR      08/12/2021    GFR NOT REPORTED 08/12/2021     HFP:    Lab Results   Component Value Date    PROT 7.0 08/09/2021     CMP:    Lab Results   Component Value Date    GLUCOSE 78 08/12/2021    GLUCOSE 92 04/30/2012     08/12/2021    K 3.3 08/12/2021     08/12/2021    CO2 23 08/12/2021    BUN 3 08/12/2021    CREATININE 0.51 08/12/2021    ANIONGAP 16 08/12/2021    ALKPHOS 250 08/09/2021    ALT 10 08/09/2021    AST 19 08/09/2021    BILITOT 0.22 08/09/2021    LABALBU 3.0 08/09/2021    ALBUMIN 0.8 08/09/2021    LABGLOM >60 08/12/2021    GFRAA >60 08/12/2021    GFR      08/12/2021    GFR NOT REPORTED 08/12/2021    PROT 7.0 08/09/2021    CALCIUM 8.3 08/12/2021     PT/INR:    Lab Results   Component Value Date    PROTIME 10.9 08/09/2021    INR 1.0 08/09/2021     PTT:   Lab Results   Component Value Date    APTT 24.6 08/09/2021     FLP:    Lab Results   Component Value Date    HDL 42 12/23/2020     U/A:    Lab Results   Component Value Date    COLORU DARK YELLOW 08/09/2021    TURBIDITY CLEAR 08/09/2021    SPECGRAV 1.038 08/09/2021    HGBUR NEGATIVE 08/09/2021    PHUR 6.0 08/09/2021    PROTEINU TRACE 08/09/2021    GLUCOSEU NEGATIVE 08/09/2021    KETUA NEGATIVE 08/09/2021    BILIRUBINUR NEGATIVE 08/09/2021    BILIRUBINUR moderate 12/18/2018    UROBILINOGEN Normal 08/09/2021    NITRU NEGATIVE 08/09/2021    LEUKOCYTESUR MODERATE 08/09/2021     TSH:    Lab Results   Component Value Date    TSH 0.68 12/23/2020         Radiology:  CT ABDOMEN PELVIS W IV CONTRAST Additional Contrast? None    Result Date: 8/11/2021  1. Approximately 50% decrease in size of the septated fluid collection in the left upper quadrant, with residual components measuring up to 7 x 5 cm with indwelling percutaneous drain. 2. New area of adjacent edema within the mesentery at the splenic flexure of the colon has developed without additional abscess or fluid collection. 3. Persistent inflammatory changes in the splenic flexure of the colon, similar to the prior exam.  These could be reactive to the adjacent fluid collection. 4. Left lower lobe atelectasis. Mucous plugging in the right lower lobe segmental bronchi. 5. Subacute T12 and L5 compression fractures with 25% vertebral body height loss. XR CHEST PORTABLE    Result Date: 8/9/2021  1. Interval decreased bibasilar ill-defined consolidation suggesting pneumonia. 2. Interval decreased volume of mild bilateral pleural effusions. Consultations:    Consults:     Final Specialist Recommendations/Findings:   IP CONSULT TO HOSPITALIST  IP CONSULT TO SOCIAL WORK  IP CONSULT TO INFECTIOUS DISEASES  IP CONSULT TO GI      The patient was seen and examined on day of discharge and this discharge summary is in conjunction with any daily progress note from day of discharge.   GEN: NAD  CV: RRR  LUNGS: CTAB  ABD: NT/ND    Discharge plan:     Disposition: Home    Physician Follow Up:      Norma Bolanos MD  [address] [address]  [phone]    Schedule an appointment as soon as possible for a visit in 1 week      Janneth Tomas MD  [place] [address]  [phone]    Schedule an appointment as soon as possible for a visit in 4 weeks         Requiring Further Evaluation/Follow Up POST HOSPITALIZATION/Incidental Findings:      Diet: low fat, low cholesterol diet    Activity: As tolerated     Instructions to Patient: Discharge Medications:      Medication List      START taking these medications    ciprofloxacin 500 MG tablet  Commonly known as: CIPRO  Take 1 tablet by mouth 2 times daily for 4 days Stop after 8/15     metroNIDAZOLE 500 MG tablet  Commonly known as: FLAGYL  Take 1 tablet by mouth 3 times daily for 4 days Stop after 8/15        CONTINUE taking these medications    acamprosate 333 MG tablet  Commonly known as: CAMPRAL  Take 2 tablets by mouth 3 times daily     albuterol sulfate  (90 Base) MCG/ACT inhaler  Commonly known as: Ventolin HFA  Inhale 2 puffs into the lungs 4 times daily as needed for Wheezing     amLODIPine 5 MG tablet  Commonly known as: NORVASC  TAKE ONE TABLET BY MOUTH DAILY     baclofen 10 MG tablet  Commonly known as: LIORESAL  Take 1 tablet by mouth 3 times daily     budesonide-formoterol 160-4.5 MCG/ACT Aero  Commonly known as: Symbicort  Inhale 2 puffs into the lungs 2 times daily     busPIRone 30 MG tablet  Commonly known as: BUSPAR  Take 30 mg by mouth 2 times daily (with meals)     carBAMazepine 200 MG tablet  Commonly known as: TEGRETOL  Take 1 tablet by mouth 3 times daily     escitalopram 5 MG tablet  Commonly known as: LEXAPRO  Take 1 tablet by mouth daily     ferrous sulfate 325 (65 Fe) MG tablet  Commonly known as: IRON 325  Take 1 tablet by mouth 2 times daily     folic acid 1 MG tablet  Commonly known as: FOLVITE  Take 1 tablet by mouth daily     hydrOXYzine 25 MG tablet  Commonly known as: ATARAX  Take 1 tablet by mouth 3 times daily as needed for Anxiety     Klor-Con M20 20 MEQ extended release tablet  Generic drug: potassium chloride  TAKE ONE TABLET BY MOUTH DAILY     nicotine 21 MG/24HR  Commonly known as: NICODERM CQ  Place 1 patch onto the skin daily     pregabalin 200 MG capsule  Commonly known as: LYRICA  Take 1 capsule by mouth 3 times daily for 90 days.      rOPINIRole 1 MG tablet  Commonly known as: REQUIP  Take 1 tablet by mouth nightly     sodium chloride 0.65 % nasal spray  Commonly known as: [employer]ist Spray  1 spray by Nasal route as needed for Congestion     spironolactone 50 MG tablet  Commonly known as: ALDACTONE  Take 1 tablet by mouth daily     tiotropium 2.5 MCG/ACT Aers inhaler  Commonly known as: Spiriva Respimat  Inhale 2 puffs into the lungs daily     topiramate 50 MG tablet  Commonly known as: TOPAMAX  Take 1 tablet by mouth 2 times daily     traZODone 50 MG tablet  Commonly known as: DESYREL  Take 1 tablet by mouth nightly as needed for Sleep     vitamin B-1 100 MG tablet  Commonly known as: THIAMINE  Take 1 tablet by mouth daily     vitamin D 1.25 MG (80983 UT) Caps capsule  Commonly known as: ERGOCALCIFEROL  Take 1 capsule by mouth once a week        STOP taking these medications    Acetaminophen Extra Strength 500 MG tablet  Generic drug: acetaminophen     ibuprofen 600 MG tablet  Commonly known as: ADVIL;MOTRIN     nicotine polacrilex 2 MG gum  Commonly known as: NICORETTE           Where to Get Your Medications      These medications were sent to [address] [place] [address],[address]    Phone: [phone]   ciprofloxacin 500 MG tablet  metroNIDAZOLE 500 MG tablet         No discharge procedures on file. Time Spent on discharge is  37 mins in patient examination, evaluation, counseling as well as medication reconciliation, prescriptions for required medications, discharge plan and follow up. Electronically signed by   Romana Begin, MD  8/12/2021  7:50 AM      Thank you Dr. Charlie Ferguson MD for the opportunity to be involved in this patient's care.

## 2021-08-12 NOTE — PLAN OF CARE
Problem: Nutritional:  Goal: Nutritional status will improve  Description: Nutritional status will improve  Outcome: Ongoing     Problem: Physical Regulation:  Goal: Diagnostic test results will improve  Description: Diagnostic test results will improve  Outcome: Ongoing  Goal: Will remain free from infection  Description: Will remain free from infection  Outcome: Ongoing  Goal: Ability to maintain vital signs within normal range will improve  Description: Ability to maintain vital signs within normal range will improve  Outcome: Ongoing     Problem: Respiratory:  Goal: Ability to maintain normal respiratory secretions will improve  Description: Ability to maintain normal respiratory secretions will improve  Outcome: Ongoing     Problem: Skin Integrity:  Goal: Demonstration of wound healing without infection will improve  Description: Demonstration of wound healing without infection will improve  Outcome: Ongoing  Goal: Complications related to intravenous access or infusion will be avoided or minimized  Description: Complications related to intravenous access or infusion will be avoided or minimized  Outcome: Ongoing  Goal: Will show no infection signs and symptoms  Description: Will show no infection signs and symptoms  Outcome: Ongoing  Goal: Absence of new skin breakdown  Description: Absence of new skin breakdown  Outcome: Ongoing

## 2021-08-12 NOTE — PROGRESS NOTES
Discharge instructions given and understood. Patient left unit with mom and staff via wheelchair and home oxygen tank.  Patient left with all belongings and supplies for [employer]

## 2021-08-12 NOTE — CONSULTS
[facility] -   Infectious diseases evaluation  admission date 8/9/2021    reason for consultation:   pancreatitis - leukocytosis    Impression :   Current:  · bandemia  · Left colitis, diverticulosis  · Acute tail pancreatitis  · Peripancreatic fluid collection - improved, drain 7/28  · 8/9 drain plugged and increased left abd pain - admitted, unplugged and pain better  · No fever  · Left LL mucous plug and telectasis  · C/O severe LUQ pain  · Livedo reticularis over the back    Other:  ·   Discussion / summary of stay / plan of care   ·   Recommendations     · Left LL atelectasis - spirometer  · Cause of the leukocytosis unclear, might e from left FRANSISCO drainage obstruction, cant R/O drain infection as well  · keep zosyn  · WBC improved on Zosyn  · AB 7 days total ending treatment on 8/15  · Consider switching to Ciprofloxacin po upon discharge; QTC (352)  · ERCP and panc stent completed today 8/11  · AB reconsiled - ok for DC  · Monitor Lipase for s/p ERCP pancreatitis- will discuss with Dr. Gabriela Smith     Infection Control Recommendations   · Seco Precautions      Antimicrobial Stewardship Recommendations   · Simplification of therapy  · Targeted therapy      Coordination ofOutpatient Care:   · Estimated Length of IV antimicrobials:  · Patient will need Midline / picc Catheter Insertion:   · Patient will need SNF:  · Patient will need outpatient wound care:     History of Present Illness:   Initial history:  Amarilis Pedersen is a 45y.o.-year-old female   Back w increased LUQ pain and drain lugged - pain better after drain opened  Fluid in bulb is old blood - not foul and no pus or stools CT AP improved collection size and pain, no fever all along and rash  Was seen for panc tail pancreatitis recently etoh relapsed and drain p;avced in the bella pancreatic cysts, and planned for ERC and panc stent outpt, did not have nat to happen, came right back.     Not septic looking - leukocytosis w LLL atelectasis    8/10  Continues to feel pain but has noted improvement since yesterday. There is still tenderness to palpation of the left upper abdomen as well as pressure in the area. Drain is continuing to drain and patient has been emptying her drains appropriately. She is interested in learning how to flush her drain herself-was told that she could speak to GI. Pt expresses concern regarding her current abx tx and its effect on her pancreas and ERCP. It was explained to her that there are no current contraindications to her abx tx and she expressed understanding. 8/11   Patient scheduled to undergo ERCP today. Continues to feel pain same as yesterday. Complains of some abdominal distention as well. Mentions that she feels she is passing some flatus but could be passing more. Abd feels distended on PE. Anxious about her upcoming ERCP procedure. Would like to speak to someone regarding at home drain care/flushing. She has an increase in appetite and would like to start eating solid foods again. Mother was present in room today. Asked if there could be a nutritionist consult for an at home diet plan. 8/12   Patient is 1 day sp ERCP and panc stent procedure. She continues to feel sharp pains in her LT upper abd but not worse than yesterday. She appears to be less anxious. Pt mentions that she no longer feels abdominal distention. The patient has started eating solid foods again. Patient did mention that she feels \"twitching\" with her arm sometimes which is worrying her. No active twitches were observed on PE. She has not spoken to a nurse regarding drain care yet and would like to speak with a nutritionist regarding DC diet plan. Mother was present today.        Interval changes  8/12/2021   Patient Vitals for the past 8 hrs:   BP Temp Temp src Pulse Resp SpO2 Weight   08/12/21 0734 -- -- -- -- -- 93 % --   08/12/21 0731 -- -- -- -- -- 91 % --   08/12/21 0730 -- -- -- -- -- (!) 86 % --   08/12/21 0729 112/71 97.9 °F (36.6 °C) Oral 80 21 (!) 87 % --   08/12/21 0427 -- -- -- -- -- -- 130 lb 1.1 oz (59 kg)   08/12/21 0350 102/68 98.4 °F (36.9 °C) Oral 79 12 90 % --       8/10  Patient appears alert and oriented with anxious affect   abd pain bettef - FRANSISCO drainage bloody and stable -  WBC nl on Zosyn   Afebrile  Bcx Neg    8/11  Patient appears well but remains anxious   Abd pain same as yesterday- FRANSISCO drainage bloody and stable   WBC nl on Zosyn   Afebrile   Bcx Neg  ERCP scheduled for today     8/12  Patient appears well and less anxious s/p ERCP and pancreatic stent, per I easy to place and no cplcts  Abd pain better but remains- FRANSISCO drainage bloody much smaller  Ill FU w GI oupt for removal of the drain in 2 weeks  WBC nl on Zosyn   Afebrile       Summary of relevant labs:  Labs:  Wbc 24-8.8-10.4-8.8  Lipase 471-72  Micro:  BC   Imaging:  CT AP 8/9  Approximately 50% decrease in size of the septated fluid collection in the   left upper quadrant, with residual components measuring up to 7 x 5 cm with   indwelling percutaneous drain. 2. New area adjacent edema within the mesentery at the splenic flexure of the   colon has developed without additional abscess or fluid collection. 3. Persistent inflammatory changes at the splenic flexure of the colon,   similar to the prior exam.  These could be reactive to the adjacent fluid   collection. 4. Left lower lobe atelectasis. Mucous plugging in the right lower lobe   segmental bronchi. 5. Subacute T12 and L5 compression fractures with 25% vertebral body height   loss. I have personally reviewed the past medical history, past surgical history, medications, social history, and family history, and I haveupdated the database accordingly. Allergies:   Patient has no known allergies. Review of Systems:     Review of Systems   Constitutional: Negative for activity change, appetite change and fatigue.    HENT: Negative for congestion, ear pain and facial swelling. Eyes: Negative for photophobia, pain and itching. Respiratory: Negative for apnea and stridor. Cardiovascular: Negative for chest pain. Gastrointestinal: Positive for abdominal pain. Negative for abdominal distention and blood in stool. Endocrine: Negative for heat intolerance, polydipsia, polyphagia and polyuria. Genitourinary: Negative for dysuria, frequency and pelvic pain. Musculoskeletal: Negative for arthralgias. Skin: Negative for color change and pallor. Allergic/Immunologic: Negative for immunocompromised state. Neurological: Negative for dizziness, syncope, speech difficulty and light-headedness. Hematological: Negative for adenopathy. Psychiatric/Behavioral: Negative for agitation, confusion and decreased concentration. The patient is not nervous/anxious. Physical Examination :       Physical Exam  Constitutional:       General: She is not in acute distress. Appearance: Normal appearance. She is toxic-appearing. She is not ill-appearing or diaphoretic. HENT:      Head: Normocephalic and atraumatic. Nose: Nose normal. No congestion. Mouth/Throat:      Mouth: Mucous membranes are moist.      Pharynx: No posterior oropharyngeal erythema. Eyes:      General: No scleral icterus. Conjunctiva/sclera: Conjunctivae normal.   Cardiovascular:      Rate and Rhythm: Normal rate and regular rhythm. Heart sounds: Normal heart sounds. No murmur heard. No friction rub. Pulmonary:      Effort: Pulmonary effort is normal.      Breath sounds: Normal breath sounds. Chest:      Chest wall: No tenderness. Abdominal:      General: There is no distension. Palpations: There is no mass. Tenderness: There is abdominal tenderness. Genitourinary:     Comments: No helms  Musculoskeletal:         General: No swelling, tenderness, deformity or signs of injury. Cervical back: Neck supple. No rigidity or tenderness.    Skin: General: Skin is dry. Coloration: Skin is not jaundiced or pale. Neurological:      General: No focal deficit present. Mental Status: She is alert and oriented to person, place, and time. Cranial Nerves: No cranial nerve deficit. Sensory: No sensory deficit. Motor: No weakness.    Psychiatric:         Mood and Affect: Mood normal.         Behavior: Behavior normal.         Past Medical History:     Past Medical History:   Diagnosis Date    Acute respiratory failure with hypoxia ([address] 10/16/2020    Alcohol withdrawal syndrome, with delirium ([address] 12/14/2019    Alcoholism ([address]     Anemia 10/2020    GI bleed    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24, the patient underwent 2 surgical resections without known recurrence 10/23/2020    Closed fracture of lateral portion of left tibial plateau 67/64/5937    COPD (chronic obstructive pulmonary disease) ([facility]     CO2 retainer, on Bipap at night for this, Dr. Cherylynn Lombard ( last visit 11/20/2020 and note on chart )    Depression     bipolar, major depressive disorder, ptsd, anxiety    Dysphagia     GI bleed 10/2020    Hypertension     Memory loss     Oxygen dependent     pt stated not needed as of 12/9/2020    Pain, joint, ankle and foot     Pancreatic lesion 10/2020    Dr. Lincoln Alexis working up pt    Peripheral neuropathy     Seizures ([facility]     also baseline tremors-last sz summer 2020    Tension headache     Under care of team 07/01/2021    neuro-Dr Wan-[facility]-last visit june 2021    Under care of team 07/01/2021    pain management-Hamzah jimenez-last visit june 2021    Under care of team 07/01/2021    pulmonology-Dr Dueñas-[facility]-last virtual visit feb 2021    Under care of team 07/01/2021    psych-bahnfeldt NP-telemed-last visit may 2021    Under care of team 07/01/2021    [address]-last visit june 2021    Wellness examination 07/01/2021    pcp-Ludivina grimes-last visit may 2021       Past Surgical  History:     Past Surgical History:   Procedure Laterality Date    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    COLONOSCOPY N/A 10/22/2020    COLONOSCOPY DIAGNOSTIC performed by Nancy Reed MD at [facility]    [address]  7/28/2021    CT ABSCESS DRAIN SUBCUTANEOUS 7/28/2021 [facility] CT SCAN    ENDOSCOPIC ULTRASOUND (LOWER) N/A 12/9/2020    ENDOSCOPIC ULTRASOUND, UPPER WITH LINEAR SCOPE FOR BIOPSY OF MASS ON HEAD OF PANCREAS performed by Rika Castillo MD [facility] ER[facility]  08/11/2021    FRACTURE SURGERY Left 7-3-13    ORIF tibial plateau    FRACTURE SURGERY Right     small finger metacarpal fracture    HAND SURGERY      pins    HYSTERECTOMY  2003    UPPER GASTROINTESTINAL ENDOSCOPY N/A 10/22/2020    EGD BIOPSY performed by Nancy Reed MD at [address] [place] [address] N/A 4/5/2021    EGD BIOPSY performed by Nancy Reed MD at [facility]       Medications:      lipase-protease-amylase  36,000 Units Oral TID WC    amLODIPine  5 mg Oral Daily    baclofen  10 mg Oral TID    budesonide-formoterol  2 puff Inhalation BID    busPIRone  30 mg Oral BID WC    carBAMazepine  200 mg Oral TID    ferrous sulfate  325 mg Oral BID    folic acid  1 mg Oral Daily    nicotine  1 patch Transdermal Daily    pregabalin  200 mg Oral TID    topiramate  50 mg Oral BID    vitamin B-1  100 mg Oral Daily    sodium chloride flush  5-40 mL Intravenous 2 times per day    [Held by provider] enoxaparin  40 mg Subcutaneous Daily    pantoprazole  40 mg Oral QAM AC    piperacillin-tazobactam  3,375 mg Intravenous Q8H    traZODone  50 mg Oral Nightly       Social History:     Social History     Socioeconomic History    Marital status: Single     Spouse name: Not on file    Number of children: Not on file    Years of education: Not on file    Highest education level: Not on file   Occupational History    Not on file   Tobacco Use    Smoking status: Current Every Day Smoker     Packs/day: 1.00     Years: 30.00     Pack years: 30.00     Types: Cigarettes    Smokeless tobacco: Never Used    Tobacco comment: plans on quitting in the future    Vaping Use    Vaping Use: Never used   Substance and Sexual Activity    Alcohol use: Not Currently     Alcohol/week: 0.0 standard drinks    Drug use: Yes     Frequency: 2.0 times per week     Types: Marijuana    Sexual activity: Not Currently   Other Topics Concern    Not on file   Social History Narrative    Not on file     Social Determinants of Health     Financial Resource Strain: Medium Risk    Difficulty of Paying Living Expenses: Somewhat hard   Food Insecurity: No Food Insecurity    Worried About Running Out of Food in the Last Year: Never true    Tyler of Food in the Last Year: Never true   Transportation Needs:     Lack of Transportation (Medical):      Lack of Transportation (Non-Medical):    Physical Activity:     Days of Exercise per Week:     Minutes of Exercise per Session:    Stress:     Feeling of Stress :    Social Connections:     Frequency of Communication with Friends and Family:     Frequency of Social Gatherings with Friends and Family:     Attends [employer] Services:     Active Member of Clubs or Organizations:     Attends Club or Organization Meetings:     Marital Status:    Intimate Partner Violence:     Fear of Current or Ex-Partner:     Emotionally Abused:     Physically Abused:     Sexually Abused:        Family History:     Family History   Problem Relation Age of Onset    Other Father     Cancer Maternal Grandmother     Heart Disease Paternal Grandmother     Esophageal Cancer Maternal Aunt       Medical Decision Making:   I have independently reviewed/ordered the following labs:    CBC with Differential:   Recent Labs     08/11/21  0527 08/12/21  0637   WBC 10.4 8.8   HGB 8.3* 8.5*   HCT 27.3* 27.2*   * 672*   LYMPHOPCT 18* 12*   MONOPCT 10 8 BMP:  Recent Labs     08/11/21  0527 08/12/21  0637    145*   K 3.5* 3.3*    106   CO2 20 23   BUN 4* 3*   CREATININE 0.63 0.51   MG 1.5* 1.6     Hepatic Function Panel:   No results for input(s): PROT, LABALBU, BILIDIR, IBILI, BILITOT, ALKPHOS, ALT, AST in the last 72 hours. No results for input(s): RPR in the last 72 hours. No results for input(s): HIV in the last 72 hours. No results for input(s): BC in the last 72 hours. Lab Results   Component Value Date    CREATININE 0.51 08/12/2021    GLUCOSE 78 08/12/2021    GLUCOSE 92 04/30/2012       Detailed results: Thank you for allowing us to participate in the care of this patient. Please call with questions. This note is created with the assistance of a speech recognition program.  While intending to generate adocument that actually reflects the content of the visit, the document can still have some errors including those of syntax and sound a like substitutions which may escape proof reading. It such instances, actual meaningcan be extrapolated by contextual diversion. Dajuan Esteban  Office: [phone]  Perfect serve / office [phone]      I have discussed the care of the patient, including pertinent history and exam findings,  with the resident. I have seen and examined the patient and the key elements of all parts of the encounter have been performed by me. I agree with the assessment, plan and orders as documented by the resident.     Lori Zarco, Infectious Diseases

## 2021-08-12 NOTE — PLAN OF CARE
Problem: Nutritional:  Goal: Nutritional status will improve  Description: Nutritional status will improve  8/12/2021 1454 by Alan Wayne RN  Outcome: Ongoing  8/12/2021 0420 by Katina Maloney RN  Outcome: Ongoing     Problem: Physical Regulation:  Goal: Diagnostic test results will improve  Description: Diagnostic test results will improve  8/12/2021 1454 by Alan Wayne RN  Outcome: Ongoing  8/12/2021 0420 by Katina Maloney RN  Outcome: Ongoing  Goal: Will remain free from infection  Description: Will remain free from infection  8/12/2021 1454 by Alan Wayne RN  Outcome: Ongoing  8/12/2021 0420 by Katina Maloney RN  Outcome: Ongoing  Goal: Ability to maintain vital signs within normal range will improve  Description: Ability to maintain vital signs within normal range will improve  8/12/2021 1454 by Alan Wayne RN  Outcome: Ongoing  8/12/2021 0420 by Katina Maloney RN  Outcome: Ongoing     Problem: Respiratory:  Goal: Ability to maintain normal respiratory secretions will improve  Description: Ability to maintain normal respiratory secretions will improve  8/12/2021 1454 by Alan Wayne RN  Outcome: Ongoing  8/12/2021 0420 by Katina Maloney RN  Outcome: Ongoing     Problem: Skin Integrity:  Goal: Demonstration of wound healing without infection will improve  Description: Demonstration of wound healing without infection will improve  8/12/2021 1454 by Alan Wayne RN  Outcome: Ongoing  8/12/2021 0420 by Katina Maloney RN  Outcome: Ongoing  Goal: Complications related to intravenous access or infusion will be avoided or minimized  Description: Complications related to intravenous access or infusion will be avoided or minimized  8/12/2021 1454 by Alan Wayne RN  Outcome: Ongoing  8/12/2021 0420 by Katina Maloney RN  Outcome: Ongoing  Goal: Will show no infection signs and symptoms  Description: Will show no infection signs and symptoms  8/12/2021 1454 by Alan Wyane RN  Outcome: Ongoing  8/12/2021 0420 by Katina Maloney RN  Outcome: Ongoing  Goal: Absence of new skin breakdown  Description: Absence of new skin breakdown  8/12/2021 1454 by Alan Wayne RN  Outcome: Ongoing  8/12/2021 0420 by Katina Maloney RN  Outcome: Ongoing     Problem: Falls - Risk of:  Goal: Will remain free from falls  Description: Will remain free from falls  8/12/2021 1454 by lAan Wayne RN  Outcome: Ongoing  8/12/2021 0420 by Katina Maloney RN  Outcome: Ongoing  Goal: Absence of physical injury  Description: Absence of physical injury  8/12/2021 1454 by Alan Wayne RN  Outcome: Ongoing  8/12/2021 0420 by Katina Maloney RN  Outcome: Ongoing     Problem: Pain:  Goal: Pain level will decrease  Description: Pain level will decrease  8/12/2021 1454 by Alan Wayne RN  Outcome: Ongoing  8/12/2021 0420 by Katina Maloney RN  Outcome: Ongoing  Goal: Control of acute pain  Description: Control of acute pain  8/12/2021 1454 by Alan Wayne RN  Outcome: Ongoing  8/12/2021 0420 by Katina Maloney RN  Outcome: Ongoing  Goal: Control of chronic pain  Description: Control of chronic pain  8/12/2021 1454 by Alan Wayne RN  Outcome: Ongoing  8/12/2021 0420 by Ktaina Maloney RN  Outcome: Ongoing     Problem: Nutrition  Goal: Optimal nutrition therapy  8/12/2021 1454 by Alan Wayne RN  Outcome: Ongoing  8/12/2021 0420 by Katina Maloney RN  Outcome: Ongoing

## 2021-08-14 NOTE — TELEPHONE ENCOUNTER
Writer returned [employer] phone call and left msg on her vm informing her we have a msg out to Dr Marv Youssef in regards to scheduling and we call her back the beginning of next week after we speak w/ the dr and get clarification on what is needed to be scheduled.

## 2021-08-16 ENCOUNTER — HOSPITAL ENCOUNTER (OUTPATIENT)
Age: 51
Discharge: HOME | End: 2021-08-16
Payer: MEDICARE

## 2021-08-16 ENCOUNTER — HOSPITAL ENCOUNTER (OUTPATIENT)
Dept: GENERAL RADIOLOGY | Age: 51
Discharge: HOME | End: 2021-08-18
Payer: MEDICARE

## 2021-08-16 DIAGNOSIS — S32.000A COMPRESSION FRACTURE OF LUMBAR VERTEBRA, INITIAL ENCOUNTER, UNSPECIFIED LUMBAR VERTEBRAL LEVEL: ICD-10-CM

## 2021-08-16 DIAGNOSIS — S22.000A COMPRESSION FRACTURE OF THORACIC VERTEBRA, INITIAL ENCOUNTER, UNSPECIFIED THORACIC VERTEBRAL LEVEL: ICD-10-CM

## 2021-08-16 LAB
CULTURE: NORMAL
DIRECT EXAM: NORMAL
Lab: NORMAL
SPECIMEN DESCRIPTION: NORMAL

## 2021-08-16 PROCEDURE — 72070 X-RAY EXAM THORAC SPINE 2VWS: CPT

## 2021-08-16 PROCEDURE — 72100 X-RAY EXAM L-S SPINE 2/3 VWS: CPT

## 2021-08-16 NOTE — TELEPHONE ENCOUNTER
Writer sent a message to Howard to look at the chart ad see if he wants this done or an appointment first.     Yes this still needs done. It was originally scheduled but was in the hospital at [facility] and now we need EUS soon as well  with PD stent removal and G tube remoed    Patient has her tube leaking so if we can gt her in next week would be great.

## 2021-08-17 ENCOUNTER — OFFICE VISIT (OUTPATIENT)
Dept: FAMILY MEDICINE CLINIC | Age: 51
End: 2021-08-17
Payer: MEDICARE

## 2021-08-17 VITALS
HEART RATE: 97 BPM | DIASTOLIC BLOOD PRESSURE: 78 MMHG | BODY MASS INDEX: 21.27 KG/M2 | SYSTOLIC BLOOD PRESSURE: 118 MMHG | TEMPERATURE: 98.2 F | OXYGEN SATURATION: 100 % | WEIGHT: 127.8 LBS

## 2021-08-17 DIAGNOSIS — K57.20 DIVERTICULITIS OF LARGE INTESTINE WITH ABSCESS, UNSPECIFIED BLEEDING STATUS: ICD-10-CM

## 2021-08-17 DIAGNOSIS — K86.0 ALCOHOL-INDUCED CHRONIC PANCREATITIS (HCC): Primary | ICD-10-CM

## 2021-08-17 PROCEDURE — 99215 OFFICE O/P EST HI 40 MIN: CPT | Performed by: INTERNAL MEDICINE

## 2021-08-17 PROCEDURE — 1111F DSCHRG MED/CURRENT MED MERGE: CPT | Performed by: INTERNAL MEDICINE

## 2021-08-17 RX ORDER — TRAMADOL HYDROCHLORIDE 50 MG/1
50 TABLET ORAL EVERY 8 HOURS PRN
Qty: 20 TABLET | Refills: 0 | Status: SHIPPED | OUTPATIENT
Start: 2021-08-17 | End: 2021-08-24

## 2021-08-17 RX ORDER — PANCRELIPASE 24000; 76000; 120000 [USP'U]/1; [USP'U]/1; [USP'U]/1
1 CAPSULE, DELAYED RELEASE PELLETS ORAL
Qty: 90 CAPSULE | Refills: 1 | Status: SHIPPED | OUTPATIENT
Start: 2021-08-17 | End: 2021-10-20

## 2021-08-17 NOTE — PROGRESS NOTES
Pt is here for hospital follow up. She had fluids drained from lungs, has FRANSISCO   She is not sure what med's she is taking. Her mom does her med box.

## 2021-08-17 NOTE — PATIENT INSTRUCTIONS
Call Dr Land Shock office regarding the pancreatic stent.  I started you back on Creon, which is pancreatic enzyme replacement   I have given you a referral to go back to see General Surgery - Dr Alexandro Sandifer, regarding the FRANSISCO drain in your side

## 2021-08-17 NOTE — PROGRESS NOTES
Post-Discharge Transitional Care Management Services or Hospital Follow Up      Osmel Willoughby   YOB: 1970    Date of Office Visit:  8/17/2021  Date of Hospital Admission: 8/9/21  Date of Hospital Discharge: 8/12/21  Readmission Risk Score(high >=14%.  Medium >=10%):Readmission Risk Score: 41      Care management risk score Rising risk (score 2-5) and Complex Care (Scores >=6): 9     Non face to face  following discharge, date last encounter closed (first attempt may have been earlier): *No documented post hospital discharge outreach found in the last 14 days *No documented post hospital discharge outreach found in the last 14 days    Call initiated 2 business days of discharge: *No response recorded in the last 14 days     Patient Active Problem List   Diagnosis    Acute bronchitis    Reflex sympathetic dystrophy of lower limb    Essential hypertension    Nicotine addiction    Psychophysiological insomnia    Anxiety    Alcoholic peripheral neuropathy ([address]    Hypokalemia    Macrocytic anemia    Hypomagnesemia    Tobacco use    Ambulatory dysfunction    Seizure disorder ([address]    COPD with acute exacerbation ([address]    Paresthesia of lower extremity    Acute respiratory failure with hypoxia ([address]    Pancreatic cyst    Recurrent major depressive disorder ([address]    Elevated CA 19-9 level    Perineal mass, female    Esophagitis candidal     Condyloma    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24, the patient underwent 2 surgical resections without known recurrence    Alcohol use disorder, severe, in early remission ([address]    Metabolic encephalopathy    AMAN (generalized anxiety disorder)    Major depressive disorder, recurrent severe without psychotic features ([address]    Esophageal dysphagia    Chronic peripheral neuropathic pain    History of alcohol abuse    History of petit-mal seizures    Intractable episodic tension-type headache    Personal history of astrocytoma    Multilevel spine pain    Chronic pain syndrome    Marijuana abuse    Severe sepsis (HCC)    Closed fracture of fifth thoracic vertebra (HCC)    Diverticulitis of large intestine with abscess without bleeding    Elevated brain natriuretic peptide (BNP) level    Elevated alkaline phosphatase level    Chronic pancreatitis (HCC)    Erythema ab igne    Compression fracture of thoracic vertebra (HCC)    Compression of lumbar vertebra (HCC)    Metabolic acidosis with increased anion gap and accumulation of organic acids    Community acquired pneumonia of left lower lobe of lung    Septicemia ([facility]    Alcohol-induced acute pancreatitis    Fluid collection of pancreas    Diverticulitis of large intestine without perforation or abscess with bleeding    Pseudocyst of pancreas    Bandemia    Sepsis ([facility]    Acute recurrent pancreatitis    Atelectasis of left lung       No Known Allergies    Medications [facility] as [facility] the [facility] Medication Instructions MADIHA:    Printed on:08/17/21 1390   Medication Information                      acamprosate (CAMPRAL) 333 MG tablet  Take 2 tablets by mouth 3 times daily             albuterol sulfate HFA (VENTOLIN HFA) 108 (90 Base) MCG/ACT inhaler  Inhale 2 puffs into the lungs 4 times daily as needed for Wheezing             amLODIPine (NORVASC) 5 MG tablet  TAKE ONE TABLET BY MOUTH DAILY             baclofen (LIORESAL) 10 MG tablet  Take 1 tablet by mouth 3 times daily             budesonide-formoterol (SYMBICORT) 160-4.5 MCG/ACT AERO  Inhale 2 puffs into the lungs 2 times daily             busPIRone (BUSPAR) 30 MG tablet  Take 30 mg by mouth 2 times daily (with meals)             carBAMazepine (TEGRETOL) 200 MG tablet  Take 1 tablet by mouth 3 times daily             escitalopram (LEXAPRO) 5 MG tablet  Take 1 tablet by mouth daily             ferrous sulfate (IRON 325) 325 (65 Fe) MG tablet  Take 1 tablet by mouth 2 times daily             folic acid (FOLVITE) 1 MG tablet  Take 1 tablet by mouth daily             hydrOXYzine (ATARAX) 25 MG tablet  Take 1 tablet by mouth 3 times daily as needed for Anxiety             KLOR-CON M20 20 MEQ extended release tablet  TAKE ONE TABLET BY MOUTH DAILY             nicotine (NICODERM CQ) 21 MG/24HR  Place 1 patch onto the skin daily             pregabalin (LYRICA) 200 MG capsule  Take 1 capsule by mouth 3 times daily for 90 days.              rOPINIRole (REQUIP) 1 MG tablet  Take 1 tablet by mouth nightly             sodium chloride (ALTAMIST SPRAY) 0.65 % nasal spray  1 spray by Nasal route as needed for Congestion             spironolactone (ALDACTONE) 50 MG tablet  Take 1 tablet by mouth daily             tiotropium (SPIRIVA RESPIMAT) 2.5 MCG/ACT AERS inhaler  Inhale 2 puffs into the lungs daily             topiramate (TOPAMAX) 50 MG tablet  Take 1 tablet by mouth 2 times daily             traZODone (DESYREL) 50 MG tablet  Take 1 tablet by mouth nightly as needed for Sleep             vitamin B-1 (THIAMINE) 100 MG tablet  Take 1 tablet by mouth daily             vitamin D (ERGOCALCIFEROL) 56827 units CAPS capsule  Take 1 capsule by mouth once a week                   Medications marked \"taking\" at this time  Outpatient Medications Marked as Taking for the 8/17/21 encounter (Office Visit) with Charles Cabrera MD   Medication Sig Dispense Refill    busPIRone (BUSPAR) 30 MG tablet Take 30 mg by mouth 2 times daily (with meals) 60 tablet 0    hydrOXYzine (ATARAX) 25 MG tablet Take 1 tablet by mouth 3 times daily as needed for Anxiety 90 tablet 0    traZODone (DESYREL) 50 MG tablet Take 1 tablet by mouth nightly as needed for Sleep 30 tablet 0    escitalopram (LEXAPRO) 5 MG tablet Take 1 tablet by mouth daily 30 tablet 1    acamprosate (CAMPRAL) 333 MG tablet Take 2 tablets by mouth 3 times daily 180 tablet 0    carBAMazepine (TEGRETOL) 200 MG tablet Take 1 tablet by mouth 3 times daily 90 tablet 0    topiramate (TOPAMAX) 50 MG tablet Take 1 tablet by mouth 2 times daily 60 tablet 0    spironolactone (ALDACTONE) 50 MG tablet Take 1 tablet by mouth daily 30 tablet 0    nicotine (NICODERM CQ) 21 MG/24HR Place 1 patch onto the skin daily 30 patch 0    pregabalin (LYRICA) 200 MG capsule Take 1 capsule by mouth 3 times daily for 90 days.  90 capsule 2    KLOR-CON M20 20 MEQ extended release tablet TAKE ONE TABLET BY MOUTH DAILY 30 tablet 2    amLODIPine (NORVASC) 5 MG tablet TAKE ONE TABLET BY MOUTH DAILY 90 tablet 0    baclofen (LIORESAL) 10 MG tablet Take 1 tablet by mouth 3 times daily 60 tablet 1    ferrous sulfate (IRON 325) 325 (65 Fe) MG tablet Take 1 tablet by mouth 2 times daily 180 tablet 1    rOPINIRole (REQUIP) 1 MG tablet Take 1 tablet by mouth nightly 90 tablet 1    budesonide-formoterol (SYMBICORT) 160-4.5 MCG/ACT AERO Inhale 2 puffs into the lungs 2 times daily 3 Inhaler 1    tiotropium (SPIRIVA RESPIMAT) 2.5 MCG/ACT AERS inhaler Inhale 2 puffs into the lungs daily 1 Inhaler 11    sodium chloride (ALTAMIST SPRAY) 0.65 % nasal spray 1 spray by Nasal route as needed for Congestion 1 Bottle 3    albuterol sulfate HFA (VENTOLIN HFA) 108 (90 Base) MCG/ACT inhaler Inhale 2 puffs into the lungs 4 times daily as needed for Wheezing 1 Inhaler 5    vitamin B-1 (THIAMINE) 100 MG tablet Take 1 tablet by mouth daily 30 tablet 3    vitamin D (ERGOCALCIFEROL) 31628 units CAPS capsule Take 1 capsule by mouth once a week 12 capsule 1    folic acid (FOLVITE) 1 MG tablet Take 1 tablet by mouth daily 90 tablet 1        Medications patient taking as of now reconciled against medications ordered at time of hospital discharge: Yes    Chief Complaint   Patient presents with    Follow-Up from Hospital       HPI   Initially admitted on 7/21/2021 with abdominal pain-found to have acute diverticulitis/colitis of left colon without abscess along with chronic pancreatitis, worsening Endocrine: Negative. Musculoskeletal: Positive for arthralgias and back pain. Negative for joint swelling and myalgias. Skin: Negative. Neurological: Negative for dizziness. Psychiatric/Behavioral: Negative for sleep disturbance. All other systems reviewed and are negative. Vitals:    08/17/21 1548   BP: 118/78   Site: Left Upper Arm   Position: Sitting   Cuff Size: Medium Adult   Pulse: 97   Temp: 98.2 °F (36.8 °C)   TempSrc: Temporal   SpO2: 100%   Weight: 127 lb 12.8 oz (58 kg)     Body mass index is 21.27 kg/m². Wt Readings from Last 3 Encounters:   08/17/21 127 lb 12.8 oz (58 kg)   08/10/21 130 lb 8.2 oz (59.2 kg)   08/12/21 130 lb 1.1 oz (59 kg)     BP Readings from Last 3 Encounters:   08/17/21 118/78   08/12/21 102/81   08/11/21 104/76       Physical Exam  Vitals and nursing note reviewed. Constitutional:       General: She is in acute distress (tearful, c/o pain ). Appearance: She is well-developed. She is not ill-appearing. Eyes:      General: Lids are normal. Vision grossly intact. Cardiovascular:      Rate and Rhythm: Normal rate and regular rhythm. Heart sounds: Normal heart sounds, S1 normal and S2 normal. No murmur heard. No friction rub. No gallop. Pulmonary:      Effort: Pulmonary effort is normal. No respiratory distress. Breath sounds: Normal breath sounds. No wheezing. Abdominal:      General: Bowel sounds are normal.      Palpations: Abdomen is soft. There is no mass. Tenderness: There is no abdominal tenderness. There is no guarding. Musculoskeletal:         General: Normal range of motion. Skin:     General: Skin is warm and dry. Capillary Refill: Capillary refill takes less than 2 seconds. Neurological:      General: No focal deficit present. Mental Status: She is alert and oriented to person, place, and time. Assessment/Plan:  1.  Alcohol-induced chronic pancreatitis ([facility]  - lipase-protease-amylase (CREON) 24248-66541 units delayed release capsule; Take 1 capsule by mouth 3 times daily (with meals)  Dispense: 90 capsule; Refill: 1  - CA DISCHARGE MEDS RECONCILED W/ CURRENT OUTPATIENT MED LIST  - traMADol (ULTRAM) 50 MG tablet; Take 1 tablet by mouth every 8 hours as needed for Pain for up to 7 days. Intended supply: 7 days. Take lowest dose possible to manage pain  Dispense: 20 tablet; Refill: 0    2. Diverticulitis of large intestine with abscess, unspecified bleeding status  - Peg Tirado MD, Trauma Surgery, [place]  - traMADol (ULTRAM) 50 MG tablet; Take 1 tablet by mouth every 8 hours as needed for Pain for up to 7 days. Intended supply: 7 days. Take lowest dose possible to manage pain  Dispense: 20 tablet;  Refill: 0        Medical Decision Making: high complexity

## 2021-08-18 ENCOUNTER — TELEPHONE (OUTPATIENT)
Dept: FAMILY MEDICINE CLINIC | Age: 51
End: 2021-08-18

## 2021-08-18 NOTE — TELEPHONE ENCOUNTER
Writer returned pt's phone call and left msg on pt's vm asking pt to call back & sched EUS & ERCP w/ stent removal & G-tube removal.

## 2021-08-18 NOTE — TELEPHONE ENCOUNTER
Patient's mother called to verify if our office was informed that her Hydroxyzine was changed to 25 mg. Mother stated patient was discharged with only a 10 day supply and will be running out.  Please send to [facility] on [facility]

## 2021-08-19 ENCOUNTER — TELEPHONE (OUTPATIENT)
Dept: NEUROLOGY | Age: 51
End: 2021-08-19

## 2021-08-19 ENCOUNTER — TELEPHONE (OUTPATIENT)
Dept: GASTROENTEROLOGY | Age: 51
End: 2021-08-19

## 2021-08-19 ENCOUNTER — TELEPHONE (OUTPATIENT)
Dept: PSYCHIATRY | Age: 51
End: 2021-08-19

## 2021-08-19 DIAGNOSIS — F33.9 EPISODE OF RECURRENT MAJOR DEPRESSIVE DISORDER, UNSPECIFIED DEPRESSION EPISODE SEVERITY (HCC): Primary | ICD-10-CM

## 2021-08-19 DIAGNOSIS — R13.19 ESOPHAGEAL DYSPHAGIA: Primary | ICD-10-CM

## 2021-08-19 DIAGNOSIS — K31.84 GASTROPARESIS: ICD-10-CM

## 2021-08-19 DIAGNOSIS — F41.1 GAD (GENERALIZED ANXIETY DISORDER): ICD-10-CM

## 2021-08-19 DIAGNOSIS — K86.0 ALCOHOL-INDUCED CHRONIC PANCREATITIS (HCC): Primary | ICD-10-CM

## 2021-08-19 RX ORDER — OMEPRAZOLE 40 MG/1
40 CAPSULE, DELAYED RELEASE ORAL 2 TIMES DAILY
Qty: 60 CAPSULE | Refills: 2 | Status: SHIPPED | OUTPATIENT
Start: 2021-08-19 | End: 2021-12-17

## 2021-08-19 RX ORDER — HYDROXYZINE HYDROCHLORIDE 25 MG/1
TABLET, FILM COATED ORAL
Qty: 45 TABLET | Refills: 0 | Status: SHIPPED | OUTPATIENT
Start: 2021-08-19 | End: 2021-08-25 | Stop reason: SDUPTHER

## 2021-08-19 RX ORDER — METOCLOPRAMIDE 5 MG/1
5 TABLET ORAL 3 TIMES DAILY
Qty: 120 TABLET | Refills: 2 | Status: SHIPPED | OUTPATIENT
Start: 2021-08-19 | End: 2022-01-10

## 2021-08-19 NOTE — TELEPHONE ENCOUNTER
Pt called to request refill on Hydroxyzine and stated when she was Inpatient they cut her dose in 1/2 . Could she please get a script for Hydroxyzine 25mg take 1/2 tab 3 times daily?

## 2021-08-19 NOTE — TELEPHONE ENCOUNTER
Patients mother called in regards to her medications. She states that when [facility] left the hospital they discontinued several of her medications and she is now extremely nauseated, very gaseous and not having regulated bowel movements. She states the patient was taken off of her Reglan and Prilosec. Patients mother states that her daughter is an alcoholic and has problems with her memory as well. Writer requested that she comes to patients appointments in the future- mother agreed. Informed mother that I would speak with  to make sure she should be started back up on these medications- once I hear from him I will call her back and send the medications back to her pharmacy. Mother thanked Hong Nicholson.

## 2021-08-19 NOTE — TELEPHONE ENCOUNTER
Writer spoke with mom. Informed her that we will be sending medications to patients pharmacy and to start taking reglan and prilosec as previously ordered. Advised patient we are ordering pancreatic elastase lab to evaluate for pancreatic insufficiency and to hold off on creon until we get those results. Mother stated patient has been having some loose stools the past few days. Was very appreciative of our promptness with this matter and thanked writer. Sending scripts to pharmacy and ordering stool labs per .

## 2021-08-20 DIAGNOSIS — K57.20 DIVERTICULITIS OF LARGE INTESTINE WITH ABSCESS, UNSPECIFIED BLEEDING STATUS: Primary | ICD-10-CM

## 2021-08-20 NOTE — TELEPHONE ENCOUNTER
She can resume cymbalta. I reviewed the hospitalization notes. No reason was mentioned for discontinuing cymbalta. She was admitted for recurrent acute pancreatitis due to alcoholism and underwent ERCP for pancreatic duct stricture. While there are case reports of cymbalta causing acute pancreatitis (<1%), she had chronic pancreatitis long before cymbalta was started (last July). I don't see a reason to discontinue it.  Please let the patient/mother know     Electronically signed by Diaz Webb MD on 8/20/2021 at 9:00 AM

## 2021-08-23 ENCOUNTER — HOSPITAL ENCOUNTER (OUTPATIENT)
Dept: CT IMAGING | Age: 51
Discharge: HOME | End: 2021-08-25
Payer: MEDICARE

## 2021-08-23 ENCOUNTER — HOSPITAL ENCOUNTER (OUTPATIENT)
Age: 51
Setting detail: SPECIMEN
Discharge: HOME | End: 2021-08-23
Payer: MEDICARE

## 2021-08-23 ENCOUNTER — HOSPITAL ENCOUNTER (OUTPATIENT)
Dept: INTERVENTIONAL RADIOLOGY/VASCULAR | Age: 51
Discharge: HOME | End: 2021-08-25
Payer: MEDICARE

## 2021-08-23 DIAGNOSIS — K57.20 DIVERTICULITIS OF LARGE INTESTINE WITH ABSCESS, UNSPECIFIED BLEEDING STATUS: ICD-10-CM

## 2021-08-23 DIAGNOSIS — K86.0 ALCOHOL-INDUCED CHRONIC PANCREATITIS (HCC): ICD-10-CM

## 2021-08-23 PROCEDURE — 2709999900 HC NON-CHARGEABLE SUPPLY

## 2021-08-23 PROCEDURE — 74150 CT ABDOMEN W/O CONTRAST: CPT

## 2021-08-23 PROCEDURE — 83520 IMMUNOASSAY QUANT NOS NONAB: CPT

## 2021-08-23 NOTE — PROGRESS NOTES
RUQ abscess drain removed without incident on 8/23/21. CT on 8/23/21 showed scattered subdiaphragmatic fluid collections that are unchanged. This was discussed by IR attending, Dr. Rosa Elena White and with Dr. Pavan Pavon. Both agreed that drain should be removed 8/23/21 and repeat CT scan should be completed in 2-3 weeks to reevaluate fluid collections.

## 2021-08-24 ENCOUNTER — TELEPHONE (OUTPATIENT)
Dept: GASTROENTEROLOGY | Age: 51
End: 2021-08-24

## 2021-08-24 DIAGNOSIS — R18.8 ABDOMINAL FLUID COLLECTION: Primary | ICD-10-CM

## 2021-08-24 NOTE — TELEPHONE ENCOUNTER
Called pt; she is now scheduled for EUS with stent removal, [facility] Dr Mary Oden 9/8/21 at 1030am, vaccinated, PAT call on 9/2/21 at 26am. Prep given to pt over phone and emailed to [email or web address]

## 2021-08-24 NOTE — TELEPHONE ENCOUNTER
----- Message from Gayle Dumas MD sent at 8/19/2021  4:17 PM EDT -----  Regarding: RE: EUS / unknow pt  MRN: [id number]  ----- Message -----  From: Amy Han  Sent: 8/19/2021   1:51 PM EDT  To: Gayle Dumas MD  Subject: EUS / unknow pt                                  Please provide patient's information  ----- Message -----  From: Gayle Dumas MD  Sent: 8/12/2021   1:31 PM EDT  To: Beni Govea Procedure Schedulers    EUS in 3 to 4 weeks with Dr. Bert More or with me for pancreatic stricture and removal of pancreatic duct stent

## 2021-08-25 DIAGNOSIS — F41.1 GAD (GENERALIZED ANXIETY DISORDER): ICD-10-CM

## 2021-08-25 DIAGNOSIS — F33.9 EPISODE OF RECURRENT MAJOR DEPRESSIVE DISORDER, UNSPECIFIED DEPRESSION EPISODE SEVERITY (HCC): ICD-10-CM

## 2021-08-25 RX ORDER — HYDROXYZINE HYDROCHLORIDE 25 MG/1
25 TABLET, FILM COATED ORAL 3 TIMES DAILY PRN
Qty: 90 TABLET | Refills: 3 | Status: SHIPPED | OUTPATIENT
Start: 2021-08-25 | End: 2021-12-14 | Stop reason: SDUPTHER

## 2021-08-26 LAB — FECAL PANCREATIC ELASTASE-1: 628 UG/G

## 2021-08-26 ASSESSMENT — ENCOUNTER SYMPTOMS
WHEEZING: 0
VOMITING: 0
ABDOMINAL PAIN: 1
NAUSEA: 0
BACK PAIN: 1
ANAL BLEEDING: 0
SHORTNESS OF BREATH: 1
DIARRHEA: 1
CONSTIPATION: 0
COUGH: 0
BLOOD IN STOOL: 0
CHOKING: 0
CHEST TIGHTNESS: 0

## 2021-08-26 ASSESSMENT — VISUAL ACUITY: OU: 1

## 2021-08-27 ENCOUNTER — OFFICE VISIT (OUTPATIENT)
Dept: GASTROENTEROLOGY | Age: 51
End: 2021-08-27
Payer: MEDICARE

## 2021-08-27 VITALS — DIASTOLIC BLOOD PRESSURE: 82 MMHG | SYSTOLIC BLOOD PRESSURE: 119 MMHG | BODY MASS INDEX: 20.3 KG/M2 | WEIGHT: 122 LBS

## 2021-08-27 DIAGNOSIS — K86.1 CHRONIC PANCREATITIS, UNSPECIFIED PANCREATITIS TYPE (HCC): ICD-10-CM

## 2021-08-27 DIAGNOSIS — K86.0 ALCOHOL-INDUCED CHRONIC PANCREATITIS (HCC): ICD-10-CM

## 2021-08-27 DIAGNOSIS — K31.84 GASTROPARESIS: Primary | ICD-10-CM

## 2021-08-27 PROCEDURE — 4004F PT TOBACCO SCREEN RCVD TLK: CPT | Performed by: INTERNAL MEDICINE

## 2021-08-27 PROCEDURE — G8420 CALC BMI NORM PARAMETERS: HCPCS | Performed by: INTERNAL MEDICINE

## 2021-08-27 PROCEDURE — 1111F DSCHRG MED/CURRENT MED MERGE: CPT | Performed by: INTERNAL MEDICINE

## 2021-08-27 PROCEDURE — G8427 DOCREV CUR MEDS BY ELIG CLIN: HCPCS | Performed by: INTERNAL MEDICINE

## 2021-08-27 PROCEDURE — 3017F COLORECTAL CA SCREEN DOC REV: CPT | Performed by: INTERNAL MEDICINE

## 2021-08-27 PROCEDURE — 99213 OFFICE O/P EST LOW 20 MIN: CPT | Performed by: INTERNAL MEDICINE

## 2021-08-27 RX ORDER — SIMETHICONE 80 MG
80 TABLET,CHEWABLE ORAL 4 TIMES DAILY PRN
Qty: 180 TABLET | Refills: 3 | Status: SHIPPED | OUTPATIENT
Start: 2021-08-27

## 2021-08-27 ASSESSMENT — ENCOUNTER SYMPTOMS
EYES NEGATIVE: 1
VOMITING: 0
ABDOMINAL DISTENTION: 1
BLOOD IN STOOL: 0
DIARRHEA: 1
CONSTIPATION: 1
RESPIRATORY NEGATIVE: 1
RECTAL PAIN: 0
NAUSEA: 1
ALLERGIC/IMMUNOLOGIC NEGATIVE: 1

## 2021-08-27 NOTE — PROGRESS NOTES
GI FOLLOW UP    INTERVAL HISTORY:     Recent complicated hospital course identified by a possible peripancreatic fluid collection in the setting of either pancreatic duct leak versus acute on chronic pancreatitis related to chronic pancreatitis and PD stricturing  Status post ERCP with pancreatic duct stent  Underwent percutaneous drain peripancreatic fluid with recent removal  Clinically doing well from GI standpoint with mild unintentional weight loss  Here for evaluation after discharge    Chief Complaint   Patient presents with   [address] follow up/ Gastroparesis    Constipation     Patient is taking Reglan TID. States she a small BM this morning but still feels like she has to go. States having upper abdominal pain and discomfort.  Gastroesophageal Reflux     Patient taking prilosec BID. Denies noticing symptoms while on medications.  Nausea     PAtient states feeling nauseous occasionally. 1. Gastroparesis    2.  Alcohol-induced chronic pancreatitis ([address]          HISTORY OF PRESENT ILLNESS: Ms.Heather TIANNA White is a 45 y.o. female with a past history remarkable for hypertension, depression, COPD, alcoholism with dependency and recent mission for withdrawal symptoms, additionally admitted for intoxication COPD exacerbation identified to have anemia with fecal occult positive results, underwent EGD and colonoscopy (poor prep) that revealed no obvious upper GI sources of the patient's anemia however did reveal severe condyloma involving the anorectal region and general region.  Patient was referred to colorectal surgery and is undergoing evaluation to have her severe condyloma acuminata removed and treated for.     Patient will need a repeat colonoscopy with extended bowel prep when she is treated for condyloma soon as possible as she has a residual flat polyps identified on her index colonoscopy that need to be resected.     Is unlikely the patient is unable to tolerate bowel prep given that she has endoscopic evidence of severe gastroparesis. Yana Settle most recent upper endoscopy revealed significant to moderate amount of residual gastric food undigested suggestive of gastroparesis     Repeat upper endoscopy was performed abnormal esophagram which revealed a possible intraluminal narrowing in the midesophagus possible related to aortic arch compression.  EGD revealed possible extraluminal narrowing in the midesophagus but no intraluminal lesions that were obvious.  Stomach again was filled with partially digested food suggestive of gastroparesis versus nonadherence to dietary instructions versus medication induced poor contractility     Currently the patient reports a difficulty initiating swallowing in the oropharyngeal region.  Does not report any globus sensation but does not report any esophageal symptoms     CT of the chest was negative for any extraluminal compression of the midesophagus  Swallowing study was unremarkable  Emptying test identified evidence of gastroparesis      PD stricture likely in the setting of chronic pancreatitis status post CBD stent placement on August 11, 2021    Past Medical,Family, and Social History reviewed and does contribute to the patient presenting condition. Patient's PMH/PSH,SH,PSYCH Hx, MEDs, ALLERGIES, and ROS were all reviewed and updated in the appropriate sections.     PAST MEDICAL HISTORY:  Past Medical History:   Diagnosis Date    Acute respiratory failure with hypoxia ([address] 10/16/2020    Alcohol withdrawal syndrome, with delirium ([address] 12/14/2019    Alcoholism ([address]     Anemia 10/2020    GI bleed    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24, the patient underwent 2 surgical resections without known recurrence 10/23/2020    Closed fracture of lateral portion of left tibial plateau 12/39/4035    COPD (chronic obstructive pulmonary disease) [facility]     CO2 retainer, on Bipap at night for this, Dr. Marisol Wong ( last visit 11/20/2020 and note on chart )    Depression     bipolar, major depressive disorder, ptsd, anxiety    Dysphagia     GI bleed 10/2020    Hypertension     Memory loss     Oxygen dependent     pt stated not needed as of 12/9/2020    Pain, joint, ankle and foot     Pancreatic lesion 10/2020    Dr. Valdovinos Artist working up pt    Peripheral neuropathy     Seizures ([facility]     also baseline tremors-last sz summer 2020    Tension headache     Under care of team 07/01/2021    neuro-Dr [facility]-last visit june 2021    Under care of team 07/01/2021    pain management-Hamzah Martines-nery jimenez-last visit june 2021    Under care of team 07/01/2021    pulmonology-Dr Dueñas[facility]-last virtual visit feb 2021    Under care of team 07/01/2021    psych-bahnfeldt NP-telemed-last visit may 2021    Under care of team 07/01/2021    [address]-last visit june 2021    Wellness examination 07/01/2021    pcp-Ludivina grimes-last visit may 2021       Past Surgical History:   Procedure Laterality Date    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    COLONOSCOPY N/A 10/22/2020    COLONOSCOPY DIAGNOSTIC performed by Kiara Davis MD at [address]  7/28/2021    CT ABSCESS DRAIN SUBCUTANEOUS 7/28/2021 ST CT SCAN    ENDOSCOPIC ULTRASOUND (LOWER) N/A 12/9/2020    ENDOSCOPIC ULTRASOUND, UPPER WITH LINEAR SCOPE FOR BIOPSY OF MASS ON HEAD OF PANCREAS performed by Wilman Morales MD at [facility] ERCP  08/11/2021    ERCP N/A 8/11/2021    ERCP STENT INSERTION performed by Hung Vogel MD at [facility]    ERCP  8/11/2021    ERCP SPHINCTER/PAPILLOTOMY performed by Hung Vogel MD at [facility] Left 7-3-13    ORIF tibial plateau    FRACTURE SURGERY Right     small finger metacarpal fracture    HAND SURGERY      pins    HYSTERECTOMY 2003    UPPER GASTROINTESTINAL ENDOSCOPY N/A 10/22/2020    EGD BIOPSY [facility] by [facility]ara Davis MD at [facility] N/A 4/5/2021    EGD BIOPSY performed by Kiara Davis MD at [facility]       CURRENT MEDICATIONS:    Current Outpatient Medications:     hydrOXYzine (ATARAX) 25 MG tablet, Take 1 tablet by mouth 3 times daily as needed for Anxiety, Disp: 90 tablet, Rfl: 3    metoclopramide (REGLAN) 5 MG tablet, Take 1 tablet by mouth 3 times daily, Disp: 120 tablet, Rfl: 2    omeprazole (PRILOSEC) 40 MG delayed release capsule, Take 1 capsule by mouth 2 times daily, Disp: 60 capsule, Rfl: 2    lipase-protease-amylase (CREON) 03410-89412 units delayed release capsule, Take 1 capsule by mouth 3 times daily (with meals), Disp: 90 capsule, Rfl: 1    busPIRone (BUSPAR) 30 MG tablet, Take 30 mg by mouth 2 times daily (with meals), Disp: 60 tablet, Rfl: 0    traZODone (DESYREL) 50 MG tablet, Take 1 tablet by mouth nightly as needed for Sleep, Disp: 30 tablet, Rfl: 0    escitalopram (LEXAPRO) 5 MG tablet, Take 1 tablet by mouth daily, Disp: 30 tablet, Rfl: 1    acamprosate (CAMPRAL) 333 MG tablet, Take 2 tablets by mouth 3 times daily, Disp: 180 tablet, Rfl: 0    carBAMazepine (TEGRETOL) 200 MG tablet, Take 1 tablet by mouth 3 times daily, Disp: 90 tablet, Rfl: 0    topiramate (TOPAMAX) 50 MG tablet, Take 1 tablet by mouth 2 times daily, Disp: 60 tablet, Rfl: 0    spironolactone (ALDACTONE) 50 MG tablet, Take 1 tablet by mouth daily, Disp: 30 tablet, Rfl: 0    nicotine (NICODERM CQ) 21 MG/24HR, Place 1 patch onto the skin daily, Disp: 30 patch, Rfl: 0    pregabalin (LYRICA) 200 MG capsule, Take 1 capsule by mouth 3 times daily for 90 days. , Disp: 90 capsule, Rfl: 2    KLOR-CON M20 20 MEQ extended release tablet, TAKE ONE TABLET BY MOUTH DAILY, Disp: 30 tablet, Rfl: 2    amLODIPine (NORVASC) 5 MG tablet, TAKE ONE TABLET BY MOUTH DAILY, Disp: 90 tablet, Rfl: 0   baclofen (LIORESAL) 10 MG tablet, Take 1 tablet by mouth 3 times daily, Disp: 60 tablet, Rfl: 1    ferrous sulfate (IRON 325) 325 (65 Fe) MG tablet, Take 1 tablet by mouth 2 times daily, Disp: 180 tablet, Rfl: 1    rOPINIRole (REQUIP) 1 MG tablet, Take 1 tablet by mouth nightly, Disp: 90 tablet, Rfl: 1    budesonide-formoterol (SYMBICORT) 160-4.5 MCG/ACT AERO, Inhale 2 puffs into the lungs 2 times daily, Disp: 3 Inhaler, Rfl: 1    sodium chloride (ALTAMIST SPRAY) 0.65 % nasal spray, 1 spray by Nasal route as needed for Congestion, Disp: 1 Bottle, Rfl: 3    albuterol sulfate HFA (VENTOLIN HFA) 108 (90 Base) MCG/ACT inhaler, Inhale 2 puffs into the lungs 4 times daily as needed for Wheezing, Disp: 1 Inhaler, Rfl: 5    vitamin B-1 (THIAMINE) 100 MG tablet, Take 1 tablet by mouth daily, Disp: 30 tablet, Rfl: 3    vitamin D (ERGOCALCIFEROL) 42315 units CAPS capsule, Take 1 capsule by mouth once a week, Disp: 12 capsule, Rfl: 1    folic acid (FOLVITE) 1 MG tablet, Take 1 tablet by mouth daily, Disp: 90 tablet, Rfl: 1    tiotropium (SPIRIVA RESPIMAT) 2.5 MCG/ACT AERS inhaler, Inhale 2 puffs into the lungs daily, Disp: 1 Inhaler, Rfl: 11    ALLERGIES:   No Known Allergies    FAMILY HISTORY:       Problem Relation Age of Onset    Other Father     Cancer Maternal Grandmother     Heart Disease Paternal Grandmother     Esophageal Cancer Maternal Aunt          SOCIAL HISTORY:   Social History     Socioeconomic History    Marital status: Single     Spouse name: Not on file    Number of children: Not on file    Years of education: Not on file    Highest education level: Not on file   Occupational History    Not on file   Tobacco Use    Smoking status: Current Every Day Smoker     Packs/day: 0.50     Years: 30.00     Pack years: 15.00     Types: Cigarettes    Smokeless tobacco: Never Used    Tobacco comment: plans on quitting in the future    Vaping Use    Vaping Use: Never used   Substance and Sexual Activity  Alcohol use: Not Currently     Alcohol/week: 0.0 standard drinks    Drug use: Yes     Frequency: 2.0 times per week     Types: Marijuana    Sexual activity: Not Currently   Other Topics Concern    Not on file   Social History Narrative    Not on file     Social Determinants of Health     Financial Resource Strain: Medium Risk    Difficulty of Paying Living Expenses: Somewhat hard   Food Insecurity: No Food Insecurity    Worried About Running Out of Food in the Last Year: Never true    [place] of Food in the Last Year: Never true   Transportation Needs:     Lack of Transportation (Medical):  Lack of Transportation (Non-Medical):    Physical Activity:     Days of Exercise per Week:     Minutes of Exercise per Session:    Stress:     Feeling of Stress :    Social Connections:     Frequency of Communication with Friends and Family:     Frequency of Social Gatherings with Friends and Family:     Attends Sikhism Services:     Active Member of Clubs or Organizations:     Attends Club or Organization Meetings:     Marital Status:    Intimate Partner Violence:     Fear of Current or Ex-Partner:     Emotionally Abused:     Physically Abused:     Sexually Abused:        REVIEW OF SYSTEMS: A 12-point review of systems was obtained and pertinent positives and negatives were listed below. REVIEW OF SYSTEMS:     Constitutional: No fever, no chills, no lethargy, no weakness. HEENT:  No headache, otalgia, itchy eyes, nasal discharge or sore throat. Cardiac:  No chest pain, dyspnea, orthopnea or PND. Chest:   No cough, phlegm or wheezing. Abdomen:      Detailed by MA   Neuro:  No focal weakness, abnormal movements or seizure like activity. Skin:   No rashes, no itching. :   No hematuria, no pyuria, no dysuria, no flank pain. Extremities:  No swelling or joint pains. ROS was otherwise negative    Review of Systems   Constitutional: Positive for appetite change and fatigue.  Negative for unexpected weight change. HENT: Negative. Trouble swallowing: saliva. Eyes: Negative. Respiratory: Negative. Cardiovascular: Negative. Gastrointestinal: Positive for abdominal distention, constipation, diarrhea and nausea. Negative for blood in stool, rectal pain and vomiting. Anal bleeding: hemorrhoids. Endocrine: Negative. Genitourinary: Negative. Musculoskeletal: Positive for gait problem. Skin: Negative. Allergic/Immunologic: Negative. Neurological: Positive for dizziness, light-headedness and headaches. Hematological: Negative. Psychiatric/Behavioral: Positive for sleep disturbance. All other systems reviewed and are negative. PHYSICAL EXAMINATION: Vital signs reviewed per the nursing documentation. BP (!) 134/93   Wt 122 lb (55.3 kg)   BMI 20.30 kg/m²   Body mass index is 20.3 kg/m². Physical Exam    Physical Exam   Constitutional: Patient is oriented to person, place, and time. Patient appears well-developed and well-nourished. HENT:   Head: Normocephalic and atraumatic. Eyes: Pupils are equal, round, and reactive to light. EOM are normal.   Neck: Normal range of motion. Neck supple. No JVD present. No tracheal deviation present. No thyromegaly present. Cardiovascular: Normal rate, regular rhythm, normal heart sounds and intact distal pulses. Pulmonary/Chest: Effort normal and breath sounds normal. No stridor. No respiratory distress. He has no wheezes. He has no rales. He exhibits no tenderness. Abdominal: Soft. Bowel sounds are normal. He exhibits no distension and no mass. There is no tenderness. There is no rebound and no guarding. No hernia. Musculoskeletal: Normal range of motion. Lymphadenopathy:    Patient has no cervical adenopathy. Neurological: Patient is alert and oriented to person, place, and time. Psychiatric: Patient has a normal mood and affect.  Patient behavior is normal.       LABORATORY DATA: Reviewed  Lab Results Component Value Date    WBC 8.8 08/12/2021    HGB 8.5 (L) 08/12/2021    HCT 27.2 (L) 08/12/2021    MCV 94.4 08/12/2021     (H) 08/12/2021     (H) 08/12/2021    K 3.3 (L) 08/12/2021     08/12/2021    CO2 23 08/12/2021    BUN 3 (L) 08/12/2021    CREATININE 0.51 08/12/2021    LABALBU 3.0 (L) 08/09/2021    BILITOT 0.22 (L) 08/09/2021    ALKPHOS 250 (H) 08/09/2021    AST 19 08/09/2021    ALT 10 08/09/2021    INR 1.0 08/09/2021         Lab Results   Component Value Date    RBC 2.88 (L) 08/12/2021    HGB 8.5 (L) 08/12/2021    MCV 94.4 08/12/2021    MCH 29.5 08/12/2021    MCHC 31.3 08/12/2021    RDW 14.9 (H) 08/12/2021    MPV 9.8 08/12/2021    BASOPCT 0 08/12/2021    LYMPHSABS 1.09 (L) 08/12/2021    MONOSABS 0.73 08/12/2021    NEUTROABS 6.69 08/12/2021    EOSABS 0.19 08/12/2021    BASOSABS <0.03 08/12/2021         DIAGNOSTIC TESTING:     CT ABDOMEN WO CONTRAST Additional Contrast? None    Result Date: 8/23/2021  EXAMINATION: CT ABDOMEN WITHOUT CONTRAST 8/23/2021 12:40 pm TECHNIQUE: CT of the abdomen was performed without the administration of intravenous contrast. Multiplanar reformatted images are provided for review. Dose modulation, iterative reconstruction, and/or weight based adjustment of the mA/kV was utilized to reduce the radiation dose to as low as reasonably achievable. COMPARISON: 08/09/2021 HISTORY: ORDERING SYSTEM PROVIDED HISTORY: abd abscess TECHNOLOGIST PROVIDED HISTORY: abd abscess Is the patient pregnant?->No Reason for Exam: abd abscess Acuity: Unknown Type of Exam: Unknown FINDINGS: Lower Chest: Consolidation within the lingula and left lower lobe is again noted. There is linear scarring/atelectasis in the right lower lobe. Coronary artery atherosclerosis. Organs: Within the limitations of a noncontrast examination, no acute abnormality within the liver, spleen,  gallbladder, or adrenal glands. Pancreatic calcifications are noted, suggestive of chronic pancreatitis.  Stent is noted within the proximal pancreatic duct, new in the interval. Nonobstructing left nephrolithiasis. GI/Bowel: Stomach is partially distended. The visualized bowel loops are nondilated. Colonic wall thickening in the splenic flexure appears slightly improved in the interval. Peritoneum/Retroperitoneum: Percutaneous drain in the left upper quadrant is again noted with further decrease in size of the complex left upper quadrant fluid collection, now measuring approximately 5 x 5 cm (previously 6.9 x 4.9 cm). The drain is in the inferior aspect of this collection. Mesenteric stranding and fluid in the left upper quadrant also appears improved in the interval.  Enlarged mesenteric lymph nodes are noted in the left upper quadrant. There also enlarged left retroperitoneal lymph nodes. Bones/Soft Tissues: Compression deformity of multiple thoracic and lumbar vertebral bodies is similar in appearance to prior CT. 1. Slight decrease in size of the left upper quadrant fluid collection with persistent indwelling drain. There is slight improvement in the associated mesenteric stranding and fluid. 2. Interval placement of a pancreatic ductal stent with decreased ductal dilation. 3. Mesenteric and retroperitoneal lymphadenopathy, which may be reactive. 4. Chronic pancreatitis. 5. Consolidation within the lingula and left lower lobe, similar prior CT. XR CHEST (SINGLE VIEW FRONTAL)    Result Date: 7/29/2021  EXAMINATION: ONE XRAY VIEW OF THE CHEST 7/29/2021 12:22 pm COMPARISON: Chest radiograph performed 07/27/2021. HISTORY: ORDERING SYSTEM PROVIDED HISTORY: assess for effusion recurrence TECHNOLOGIST PROVIDED HISTORY: assess for effusion recurrence FINDINGS: There is mild pulmonary congestion. There are bibasilar effusions with adjacent infiltrate. There is no pneumothorax. The mediastinal structures are unremarkable. The upper abdomen is unremarkable. The extrathoracic soft tissues are unremarkable.      Pulmonary congestion with bibasilar effusions and adjacent infiltrates. XR THORACIC SPINE (2 VIEWS)    Result Date: 8/16/2021  EXAMINATION: XRAY VIEWS OF THE THORACIC SPINE 8/16/2021 11:17 am COMPARISON: July 23, 2021 HISTORY: ORDERING SYSTEM PROVIDED HISTORY: Compression fracture of thoracic vertebra, initial encounter, unspecified thoracic vertebral level ([address] TECHNOLOGIST PROVIDED HISTORY: Reason for Exam: standing AP and Lat FINDINGS: Stable appearance to T6-T8 T9 and T10 compression deformities no new fracture noted. No change in alignment     No acute fracture. No change in alignment when compared to prior study     XR LUMBAR SPINE (2-3 VIEWS)    Result Date: 8/16/2021  EXAMINATION: 2  XRAY VIEWS OF THE LUMBAR SPINE 8/16/2021 11:17 am COMPARISON: July 23, 2021 HISTORY: ORDERING SYSTEM PROVIDED HISTORY: Compression fracture of lumbar vertebra, initial encounter, unspecified lumbar vertebral level ([address] TECHNOLOGIST PROVIDED HISTORY: Standing ap and lateral Reason for Exam: standing AP and Lat FINDINGS: Stable appearance to endplate deformity of L5. No change in the alignment of the lumbar spine     No new fracture. No change in superior endplate deformity of L5. No change in alignment compared to prior study     CT ABDOMEN PELVIS W IV CONTRAST Additional Contrast? None    Result Date: 8/11/2021  EXAMINATION: CT OF THE ABDOMEN AND PELVIS WITH CONTRAST, 8/9/2021 6:31 am TECHNIQUE: CT of the abdomen and pelvis was performed with the administration of intravenous contrast. Multiplanar reformatted images are provided for review. Dose modulation, iterative reconstruction, and/or weight based adjustment of the mA/kV was utilized to reduce the radiation dose to as low as reasonably achievable. COMPARISON: 07/30/2021 HISTORY: ORDERING SYSTEM PROVIDED HISTORY:  Abd pain, percutaneous pancreatic tube appears infected. TECHNOLOGIST PROVIDED HISTORY: Abd pain, percutaneous pancreatic tube appears infected.  Decision Support Exception - unselect if not a suspected or confirmed emergency medical condition->Emergency Medical Condition (MA) Reason for Exam:  Abd pain Acuity:  Unknown Type of Exam:  Unknown FINDINGS: Lower Chest: Left lower lobe atelectasis. Mucous plugging in the right lower lobe segmental bronchi. Organs: The liver, spleen, adrenal glands, gallbladder and kidneys demonstrate no acute abnormality. No hydronephrosis. Diffuse pancreatic atrophy with multiple calcifications compatible with sequela of chronic pancreatitis. There is mild dilation of the main pancreatic duct in the pancreatic head and body up to 3 mm. No dilation of the common bile duct. GI/Bowel: Edema is present around the splenic flexure of the colon with mild wall thickening, similar to the prior exam. Normal appendix. No other dilated or inflamed loops of bowel. Pelvis: No pelvic mass, adenopathy, or fluid collection. Peritoneum/Retroperitoneum: In the left upper quadrant adjacent to the spleen and tail of the pancreas and hepatic flexure of the colon, there is a percutaneous drain within septated complex rim enhancing fluid collection with the largest component measuring to 7 x 5 cm in axial dimension, previously 9.5 x 7 cm. The collection has decreased in size by least 50%. The collection extends around the hepatic flexure of the colon. Edema has developed medial to the hepatic flexure of the colon which is new from the prior exam.  No additional fluid collection has developed. No free intraperitoneal air. Mesenteric vasculature is patent. The splenic vein is patent. Bones/Soft Tissues: Similar to 07/27/2021 but new compared with 02/12/2021, there are subacute compression fractures at T12 and L5 with 25% vertebral body height loss. A chronic T11 compression fracture is present.      1. Approximately 50% decrease in size of the septated fluid collection in the left upper quadrant, with residual components measuring up to 7 x 5 cm with indwelling percutaneous drain. 2. New area of adjacent edema within the mesentery at the splenic flexure of the colon has developed without additional abscess or fluid collection. 3. Persistent inflammatory changes in the splenic flexure of the colon, similar to the prior exam.  These could be reactive to the adjacent fluid collection. 4. Left lower lobe atelectasis. Mucous plugging in the right lower lobe segmental bronchi. 5. Subacute T12 and L5 compression fractures with 25% vertebral body height loss. CT ABDOMEN PELVIS W IV CONTRAST Additional Contrast? Oral and Rectal    Result Date: 7/30/2021  EXAMINATION: CT OF THE ABDOMEN AND PELVIS WITH CONTRAST 7/30/2021 9:37 am TECHNIQUE: CT of the abdomen and pelvis was performed with the administration of intravenous contrast. Multiplanar reformatted images are provided for review. Dose modulation, iterative reconstruction, and/or weight based adjustment of the mA/kV was utilized to reduce the radiation dose to as low as reasonably achievable. COMPARISON: 07/27/2021, 07/21/2021 HISTORY: ORDERING SYSTEM PROVIDED HISTORY: re-eval colon, pancreas TECHNOLOGIST PROVIDED HISTORY: re-eval colon, pancreas Reason for Exam: reeval colon, pancreas trauma Acuity: Unknown Type of Exam: Unknown FINDINGS: Lower Chest: Persistent but slightly improved ground-glass opacities throughout the imaged lung bases. Slightly improved but persistent atelectatic changes in the right lower lobe with increased left lower lobe atelectasis. Superimposed consolidative changes with air bronchograms in the dependent lower lobes. New small right and slightly increased now moderate left pleural effusions. Trace pericardial effusion. Small hiatal hernia. Organs: Liver, gallbladder, and adrenal glands are unchanged. Sequelae of chronic pancreatitis with parenchymal calcifications and pancreatic ductal dilatation as well as parenchymal atrophy, unchanged.   Increased perisplenic fluid though the spleen itself appears within normal limits. Nonobstructing left nephrolithiasis. Kidneys continue to enhance normally, but there is slightly increased bilateral perinephric stranding. No hydronephrosis. GI/Bowel: The entirety of the colon is well opacified with enteric contrast. There is mild colonic distension without any focal tapering to suggest obstruction, presumably due to underlying ileus. Persistent wall thickening in the region of the splenic flexure. No extraluminal contrast extravasation. Small bowel remains normal in caliber. Pelvis: Diffuse urinary bladder wall thickening. Hysterectomy. Rectal tube in place. Peritoneum/Retroperitoneum: Increasing multiloculated fluid collections in the left upper quadrant in the subphrenic space, surrounding the spleen, and adjacent to the splenic flexure of the colon, many of which are separate from the previously described dominant collection on prior comparison. A percutaneous drainage catheter is present within the dominant collection which previously spanned approximately 9.3 cm craniocaudal and is now mildly decreased 8.4 cm craniocaudal.  I suspect that many of these other collections do not communicate with pocket of fluid containing the percutaneous drainage catheter. Mildly increased inflammatory stranding along the pericolic gutter and left pararenal space. Bones/Soft Tissues: Unchanged height loss of the multiple thoracic and L5 compression fractures. Sequelae of medication injection along the anterior abdominal wall. Mild inflammatory stranding along the left lateral flank. Increasing multiloculated rim enhancing fluid collections in the left upper quadrant. Slightly decreased size of the dominant pocket of fluid which now contains a percutaneous drainage catheter, though there are innumerable new smaller collections and other previously present smaller collections which are increased in size.   I suspect many of these do not communicate with the pocket of fluid containing the drainage catheter. Excellent opacification of the colon with enteric contrast without definitive enteric contrast in the left upper quadrant collection to confirm a colonic source. Pancreatic source is a possible alternative consideration in the appropriate clinical setting. Persistent colonic wall thickening in the region of the hepatic flexure on a background of probable ileus. Increased urinary bladder wall thickening with slightly increased perinephric stranding. Correlate with urinary labs for contributory urinary tract infection. XR CHEST PORTABLE    Result Date: 8/9/2021  EXAMINATION: ONE XRAY VIEW OF THE CHEST 8/9/2021 5:45 am COMPARISON: Chest portable August 2, 2021. HISTORY: ORDERING SYSTEM PROVIDED HISTORY: cough hypoxic TECHNOLOGIST PROVIDED HISTORY: cough hypoxic Reason for Exam: cough, hypoxic FINDINGS: The heart is normal in size and configuration. The mediastinal contours are within normal limits. Bibasilar ill-defined consolidation is present. Mild blunting of the costophrenic angles is seen. . Bones and soft tissues are unremarkable. 1. Interval decreased bibasilar ill-defined consolidation suggesting pneumonia. 2. Interval decreased volume of mild bilateral pleural effusions. XR CHEST PORTABLE    Result Date: 8/2/2021  EXAMINATION: ONE XRAY VIEW OF THE CHEST 8/2/2021 2:17 pm COMPARISON: 07/29/2021, 07/27/2021 HISTORY: ORDERING SYSTEM PROVIDED HISTORY: sob TECHNOLOGIST PROVIDED HISTORY: sob Reason for Exam: upright , sob FINDINGS: The cardiac and mediastinal contours appear unchanged. Basilar opacities and pleural effusions are again demonstrated without appreciable change. No new airspace disease identified in the interval.  No evidence for pneumothorax. No significant interval change in basilar opacities and pleural effusions.      MRI ABDOMEN W WO CONTRAST MRCP    Result Date: 7/31/2021  EXAMINATION: MRI OF THE ABDOMEN WITH AND WITHOUT CONTRAST AND MRCP 7/31/2021 12:11 pm TECHNIQUE: Multiplanar multisequence MRI of the abdomen was performed with and without the administration of intravenous contrast. COMPARISON: Abdomen CTs dating to 02/12/2021, abdomen MRI 10/19/2020 HISTORY: ORDERING SYSTEM PROVIDED HISTORY: assess pancreatic duct for leak TECHNOLOGIST PROVIDED HISTORY: assess pancreatic duct for leak Is the patient pregnant?->No Reason for Exam: assess pancreatic duct for leak Additional signs and symptoms: pt states tones of belly pain FINDINGS: LOWER CHEST:  Trace to small right and small left pleural effusions with associated partial to complete collapse of the bilateral lower lobes. Suspected air bronchograms in the left lower lobe. Mild cardiomegaly. No pericardial effusion. LIVER:  Slightly increased signal on opposed-phase images and decreased signal on T2-weighted images, suggesting iron deposition. No findings of cirrhosis. GALLBLADDER/BILE DUCTS:  Normal gallbladder. Mild intrahepatic and extrahepatic biliary dilation with normal tapering of distal common bile duct. No obvious ductal filling defects nor strictures. PANCREAS:  Typical duct configuration. Mild dilation of the upstream pancreatic duct to the level of the neck with an apparent filling defect or stricture at the area of caliber change. Potential leak of fluid from the upstream main duct in the tail. Mild to moderate parenchymal atrophy. Suggestion of parenchymal edema in the tail. No abnormal enhancement SPLEEN:  Mildly atrophic. Increased signal on opposed-phase images and decreased signal on T2-weighted images, suggesting iron deposition. ADRENAL GLANDS:  Normal. KIDNEYS:  Normal. GI/BOWEL:  Normal course and caliber of the stomach, small bowel, colon, and rectum without obstruction. PELVIS:  Surgical absence of the urinary bladder and likely of the ovaries better assessed on CT. Normal urinary bladder.  PERITONEUM/RETROPERITONEUM:  Unchanged nonenlarged to mildly enlarged left para-aortic lymph nodes. Trace simple free intraperitoneal fluid. Approximately 13.8 cm x 6.5 cm x 10.3 cm heterogeneous though predominantly T1 hyperintense and T2 hypointense lesion in the left subphrenic space near the stomach, spleen, and pancreatic tail with possible diffusion restriction and peripheral enhancement. VESSELS:  Typical arterial anatomy. Mild to moderate systemic atherosclerosis. Normal variant circumaortic left renal vein. Patent veins. SOFT TISSUES/BONES:  Tiny fat containing umbilical hernia. Signal abnormalities related to suspected subacute compression deformities in the thoracic and lumbar spine. Multilevel degenerative disc disease. 1. Suggestion of interstitial edematous pancreatitis in the pancreatic tail, consistent with acute on chronic pancreatitis given the CT appearance. 2. Approximately 13.8 cm x 6.5 cm x 2.3 cm heterogeneous collection in the left subphrenic space. Moderate fat necrosis could have this appearance, but the affected area is outside the pancreas with no findings of necrotizing pancreatitis. Additionally, the finding was absent less than 4 weeks prior. There is suspicion for continuity of the lesion with the main pancreatic duct in the tail, although this is incompletely evaluated due to only incomplete inclusion of the affected area on the 3D MRCP sequence. This suggest the possibility of fat necrosis from leaked pancreatic fluid. Abscess is an additional consideration. 3. Mild dilation of the upstream pancreatic duct with abrupt caliber change in the pancreatic neck potentially due to a filling defect such as calcification as seen on CT or a stricture. There are no findings suggestive of an obstructing malignancy. 4. Mild intrahepatic and extrahepatic biliary dilation of indeterminate etiology with no findings of choledocholithiasis. Consider ERCP if there are clinical findings of cholestasis. 5. Left para-aortic lymphadenopathy is likely reactive. patient's vital signs were monitored throughout the procedure and recorded in the patient's medical record by the nurse. TECHNIQUE: Informed consent was obtained after a detailed explanation of the procedure including risks, benefits, and alternatives. Universal protocol was followed. A suitable skin site was prepped and draped in sterile fashion following ultrasound and CT localization. An 18 gauge needle was advanced under ultrasound guidance into the multiloculated fluid collection in the left upper quadrant. CT images demonstrated satisfactory needle tip position. The needle was further negotiated into a hypoechoic center under ultrasound guidance. The stylet was removed and nonturbid dark isaac fluid was aspirated. An 035 guidewire was inserted. Follow-up CT images confirmed satisfactory wire purchase. The the tract was dilated to allow placement of an 8 [place]le all-purpose drain. Total of 10 cc of fluid was aspirated. The catheter was sutured to the skin and the patient tolerated the procedure well. The catheter was attached to FRANSISCO suction drainage. Dose modulation, iterative reconstruction, and/or weight based adjustment of the mA/kV was utilized to reduce the radiation dose to as low as reasonably achievable. FINDINGS: Preprocedural CT images demonstrate that there is new high attenuation material within the multi loculated left upper quadrant fluid collection which was not present on the most recent CT obtained on July 27, 2021 at 4:27 p.m. this raises the concern for extraluminal leakage probably from the adjacent abnormal colonic segment given that there has been transit of contrast distally within the descending colon. A total of 10 mL of non turbid dark isaac fluid was removed and sent for diagnostic tests. Successful percutaneous ultrasound and CT guided placement of 8 Yi left upper quadrant drainage catheter.  New high attenuation material within the multi loculated left upper quadrant fluid collection suggests leakage from the adjacent abnormal colonic segment indicating micro perforation. The above findings were called by Dr. Laure Hall MD to Dr. Sita Turcios On 7/28/2021 at 12:32. FLUORO FOR SURGICAL PROCEDURES    Result Date: 8/11/2021  Radiology exam is complete. No Radiologist dictation. Please follow up with ordering provider. IR GUIDED THORACENTESIS PLEURAL    Result Date: 7/28/2021  PROCEDURE: ULTRASOUNDGUIDED left THORACENTESIS 7/28/2021 HISTORY: ORDERING SYSTEM PROVIDED HISTORY: left pleural effusion w abd abscess -pls empty as possible left pleuralfluid and send for cx PH and protein TECHNOLOGIST PROVIDED HISTORY: left pleural effusion w abd abscess -pls empty as possible left pleuralfluid and send for cx PH and protein Which side should the procedure be performed? ->Left Left pleural effusion TECHNIQUE: This procedure was performed by Venkat Jorgensen PA-C under indirect supervision of Dr. Mary Hobson. While the patient was rolled onto right side with left side up, after localizing, marking and anesthetizing the posterior left lower chest with one percent lidocaine, a 5 Western Nichelle Yueh needle was advanced under ultrasound guidance. Some sonogram image demonstrate safe tract access. The catheter was advanced and the needle was removed. The catheter was connected to a Vacutainer bottle and 450 mL of yellow fluidwas drained. Postprocedural ultrasound demonstrated minimal residual fluid. The catheter was removed and the site was dressed appropriately. A sample was sent for laboratory analysis. Estimated blood loss was minimum. The patient tolerated the procedure well and left the Department in stable condition. FINDINGS: A total of 450 mL of yellow fluid was removed. Successful ultrasound guided thoracentesis.               IMPRESSION: Ms.Heather TIANNA White is a 45 y.o. female with a past history remarkable for hypertension, depression, COPD, alcoholism with dependency and recent mission for withdrawal symptoms, additionally admitted for intoxication COPD exacerbation identified to have anemia with fecal occult positive results, underwent EGD and colonoscopy (poor prep) that revealed no obvious upper GI sources of the patient's anemia however did reveal severe condyloma involving the anorectal region and general region. Deb Stapleton was referred to colorectal surgery and is undergoing evaluation to have her severe condyloma acuminata removed and treated for.     Patient will need a repeat colonoscopy with extended bowel prep when she is treated for condyloma soon as possible as she has a residual flat polyps identified on her index colonoscopy that need to be resected.     Is unlikely the patient is unable to tolerate bowel prep given that she has endoscopic evidence of severe gastroparesis. Ammy Huge most recent upper endoscopy revealed significant to moderate amount of residual gastric food undigested suggestive of gastroparesis     Repeat upper endoscopy was performed abnormal esophagram which revealed a possible intraluminal narrowing in the midesophagus possible related to aortic arch compression.  EGD revealed possible extraluminal narrowing in the midesophagus but no intraluminal lesions that were obvious.  Stomach again was filled with partially digested food suggestive of gastroparesis versus nonadherence to dietary instructions versus medication induced poor contractility     Currently the patient reports a difficulty initiating swallowing in the oropharyngeal region.  Does not report any globus sensation but does not report any esophageal symptoms     CT of the chest was negative for any extraluminal compression of the midesophagus  Swallowing study was unremarkable  Emptying test identified evidence of gastroparesis    Interval history:  Recent complicated hospital course identified by a possible peripancreatic fluid collection in the setting of either pancreatic duct leak versus acute on chronic pancreatitis related to chronic pancreatitis and PD stricturing  Status post ERCP with pancreatic duct stent-5 [place] 9 cm single pigtail  Underwent percutaneous drain peripancreatic fluid with recent removal  Clinically doing well from GI standpoint with mild unintentional weight loss  Here for evaluation after discharge      Assessment  1. Gastroparesis    2. Alcohol-induced chronic pancreatitis (HCC)        PLAN:    1) acute on chronic pancreatitis with suspected PD stricturing and secondary pancreatic duct leak-status post percutaneous drain by interventional radiology to drain peripancreatic fluid with a recent outpatient removal.  Status post ERCP with the 9 cm PD stent placement single pigtail on August 11 by Dr. Romana Neptune.  Patient is due for EUS with Dr. Ezequiel Pelaez next month for evaluation of the patient's chronic pancreatitis and PD stent removal.  Return to clinic in approximately 4-6 weeks. 2) Repeat CT scan with pancreatic protocol to evaluate for resolution of patient's peripancreatic fluid and inflammatory changes prior to endoscopic ultrasound. 3) perianal and perirectal severe condyloma acuminata-ongoing evaluation by colorectal surgery, has missed follow-up appointments for treatment. Additionally, patient needs a repeat colonoscopy with extended bowel prep. Awaiting resolution of the patient's pancreatic condition    Follow up 6-8 weeks. Likely schedule for repeat colonoscopy after next visit    Thank you for allowing me to participate in the care of Ms. Emperatriz Hernandez. For any further questions please do not hesitate to contact me. I have reviewed and agree with the ROS entered by the MA/LPN from today's encounter documented in a separate note.         Luis Alberto Lamar MD, MPH   [facility]  Office #: [phone]    this note is created with the assistance of a speech recognition program.  While intending to generate a document that actually reflects the content of the visit, the document can still have some errors including those of syntax and sound a like substitutions which may escape proof reading. It such instances, actual meaning can be extrapolated by contextual diversion.

## 2021-08-30 ENCOUNTER — HOSPITAL ENCOUNTER (OUTPATIENT)
Dept: NUTRITION | Age: 51
Discharge: HOME | End: 2021-08-30
Payer: MEDICARE

## 2021-08-30 PROCEDURE — 97802 MEDICAL NUTRITION INDIV IN: CPT

## 2021-08-31 ENCOUNTER — OFFICE VISIT (OUTPATIENT)
Dept: PAIN MANAGEMENT | Age: 51
End: 2021-08-31
Payer: MEDICARE

## 2021-08-31 VITALS
HEIGHT: 65 IN | DIASTOLIC BLOOD PRESSURE: 83 MMHG | SYSTOLIC BLOOD PRESSURE: 133 MMHG | HEART RATE: 84 BPM | WEIGHT: 124 LBS | BODY MASS INDEX: 20.66 KG/M2 | OXYGEN SATURATION: 97 %

## 2021-08-31 DIAGNOSIS — M54.41 CHRONIC BILATERAL LOW BACK PAIN WITH BILATERAL SCIATICA: ICD-10-CM

## 2021-08-31 DIAGNOSIS — S32.050S CLOSED COMPRESSION FRACTURE OF L5 LUMBAR VERTEBRA, SEQUELA: ICD-10-CM

## 2021-08-31 DIAGNOSIS — G89.29 CHRONIC BILATERAL LOW BACK PAIN WITH BILATERAL SCIATICA: ICD-10-CM

## 2021-08-31 DIAGNOSIS — M48.56XS: ICD-10-CM

## 2021-08-31 DIAGNOSIS — M54.59 INTRACTABLE LOW BACK PAIN: ICD-10-CM

## 2021-08-31 DIAGNOSIS — M54.42 CHRONIC BILATERAL LOW BACK PAIN WITH BILATERAL SCIATICA: ICD-10-CM

## 2021-08-31 PROCEDURE — G8420 CALC BMI NORM PARAMETERS: HCPCS | Performed by: ANESTHESIOLOGY

## 2021-08-31 PROCEDURE — 3017F COLORECTAL CA SCREEN DOC REV: CPT | Performed by: ANESTHESIOLOGY

## 2021-08-31 PROCEDURE — 4004F PT TOBACCO SCREEN RCVD TLK: CPT | Performed by: ANESTHESIOLOGY

## 2021-08-31 PROCEDURE — 99214 OFFICE O/P EST MOD 30 MIN: CPT | Performed by: ANESTHESIOLOGY

## 2021-08-31 PROCEDURE — G8427 DOCREV CUR MEDS BY ELIG CLIN: HCPCS | Performed by: ANESTHESIOLOGY

## 2021-08-31 PROCEDURE — 1111F DSCHRG MED/CURRENT MED MERGE: CPT | Performed by: ANESTHESIOLOGY

## 2021-08-31 RX ORDER — TIZANIDINE 4 MG/1
TABLET ORAL
COMMUNITY
Start: 2021-07-19 | End: 2021-10-04 | Stop reason: ALTCHOICE

## 2021-08-31 ASSESSMENT — ENCOUNTER SYMPTOMS
RESPIRATORY NEGATIVE: 1
BACK PAIN: 1

## 2021-08-31 NOTE — PROGRESS NOTES
visit june 2021    Under care of team 07/01/2021    pain management-Hamzah Martines-nery jimenez-last visit june 2021    Under care of team 07/01/2021    pulmonology-Dr [facility]-last virtual visit feb 2021    Under care of team 07/01/2021    psych-bahnfeldt NP-telemed-last visit may 2021    Under care of team 07/01/2021    yn-[address]-last visit june 2021    Wellness examination 07/01/2021    pcp-Ludivina Grimm-[address]-last visit may 2021      Past Surgical History:   Procedure Laterality Date    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    COLONOSCOPY N/A 10/22/2020    COLONOSCOPY DIAGNOSTIC performed by Luis Alberto Lamar MD at [facility]  7/28/2021    CT ABSCESS DRAIN SUBCUTANEOUS 7/28/2021 [facility] CT SCAN    ENDOSCOPIC ULTRASOUND (LOWER) N/A 12/9/2020    ENDOSCOPIC ULTRASOUND, UPPER WITH LINEAR SCOPE FOR BIOPSY OF MASS ON HEAD OF PANCREAS performed by Herson Ramos MD at [address] ERCP  08/11/2021    ERCP N/A 8/11/2021    ERCP STENT INSERTION performed by Kristin bIrahim MD at [facility]    ERCP  8/11/2021    ERCP SPHINCTER/PAPILLOTOMY performed by Kristin Ibrahim MD at [address] [place] [address] 7-3-13    ORIF tibial plateau    FRACTURE SURGERY Right     small finger metacarpal fracture    HAND SURGERY      pins    HYSTERECTOMY  2003    UPPER GASTROINTESTINAL ENDOSCOPY N/A 10/22/2020    EGD BIOPSY performed by Luis Alberto Lamar MD at [address] [place] [address] N/A 4/5/2021    EGD BIOPSY performed by Luis Alberto Lamar MD at [place] [address] History     Socioeconomic History    Marital status: Single     Spouse name: None    Number of children: None    Years of education: None    Highest education level: None   Occupational History    None   Tobacco Use    Smoking status: Current Every Day Smoker     Packs/day: 0.50     Years: 30.00     Pack years: 15.00     Types: Cigarettes  Smokeless tobacco: Never Used    Tobacco comment: plans on quitting in the future    Vaping Use    Vaping Use: Never used   Substance and Sexual Activity    Alcohol use: Not Currently     Alcohol/week: 0.0 standard drinks    Drug use: Yes     Frequency: 2.0 times per week     Types: Marijuana    Sexual activity: Not Currently   Other Topics Concern    None   Social History Narrative    None     Social Determinants of Health     Financial Resource Strain: Medium Risk    Difficulty of Paying Living Expenses: Somewhat hard   Food Insecurity: No Food Insecurity    Worried About Running Out of Food in the Last Year: Never true    [place] of Food in the Last Year: Never true   Transportation Needs:     Lack of Transportation (Medical):  Lack of Transportation (Non-Medical):    Physical Activity:     Days of Exercise per Week:     Minutes of Exercise per Session:    Stress:     Feeling of Stress :    Social Connections:     Frequency of Communication with Friends and Family:     Frequency of Social Gatherings with Friends and Family:     Attends Denominational Services:     Active Member of Clubs or Organizations:     Attends Club or Organization Meetings:     Marital Status:    Intimate Partner Violence:     Fear of Current or Ex-Partner:     Emotionally Abused:     Physically Abused:     Sexually Abused:      Family History   Problem Relation Age of Onset    Other Father     Cancer Maternal Grandmother     Heart Disease Paternal Grandmother     Esophageal Cancer Maternal Aunt      No Known Allergies  Patient has no known allergies.    Vitals:    08/31/21 1347   BP: 133/83   Pulse: 84   SpO2: 97%     Current Outpatient Medications   Medication Sig Dispense Refill    tiZANidine (ZANAFLEX) 4 MG tablet       simethicone (MYLICON) 80 MG chewable tablet Take 1 tablet by mouth 4 times daily as needed for Flatulence 180 tablet 3    hydrOXYzine (ATARAX) 25 MG tablet Take 1 tablet by mouth 3 times daily as needed for Anxiety 90 tablet 3    metoclopramide (REGLAN) 5 MG tablet Take 1 tablet by mouth 3 times daily 120 tablet 2    omeprazole (PRILOSEC) 40 MG delayed release capsule Take 1 capsule by mouth 2 times daily 60 capsule 2    lipase-protease-amylase (CREON) 53558-99715 units delayed release capsule Take 1 capsule by mouth 3 times daily (with meals) 90 capsule 1    busPIRone (BUSPAR) 30 MG tablet Take 30 mg by mouth 2 times daily (with meals) 60 tablet 0    traZODone (DESYREL) 50 MG tablet Take 1 tablet by mouth nightly as needed for Sleep 30 tablet 0    escitalopram (LEXAPRO) 5 MG tablet Take 1 tablet by mouth daily 30 tablet 1    acamprosate (CAMPRAL) 333 MG tablet Take 2 tablets by mouth 3 times daily 180 tablet 0    carBAMazepine (TEGRETOL) 200 MG tablet Take 1 tablet by mouth 3 times daily 90 tablet 0    topiramate (TOPAMAX) 50 MG tablet Take 1 tablet by mouth 2 times daily 60 tablet 0    spironolactone (ALDACTONE) 50 MG tablet Take 1 tablet by mouth daily 30 tablet 0    nicotine (NICODERM CQ) 21 MG/24HR Place 1 patch onto the skin daily 30 patch 0    pregabalin (LYRICA) 200 MG capsule Take 1 capsule by mouth 3 times daily for 90 days.  90 capsule 2    KLOR-CON M20 20 MEQ extended release tablet TAKE ONE TABLET BY MOUTH DAILY 30 tablet 2    amLODIPine (NORVASC) 5 MG tablet TAKE ONE TABLET BY MOUTH DAILY 90 tablet 0    baclofen (LIORESAL) 10 MG tablet Take 1 tablet by mouth 3 times daily 60 tablet 1    ferrous sulfate (IRON 325) 325 (65 Fe) MG tablet Take 1 tablet by mouth 2 times daily 180 tablet 1    rOPINIRole (REQUIP) 1 MG tablet Take 1 tablet by mouth nightly 90 tablet 1    budesonide-formoterol (SYMBICORT) 160-4.5 MCG/ACT AERO Inhale 2 puffs into the lungs 2 times daily 3 Inhaler 1    sodium chloride (ALTAMIST SPRAY) 0.65 % nasal spray 1 spray by Nasal route as needed for Congestion 1 Bottle 3    albuterol sulfate HFA (VENTOLIN HFA) 108 (90 Base) MCG/ACT inhaler Inhale 2 puffs into the lungs 4 times daily as needed for Wheezing 1 Inhaler 5    vitamin B-1 (THIAMINE) 100 MG tablet Take 1 tablet by mouth daily 30 tablet 3    vitamin D (ERGOCALCIFEROL) 43035 units CAPS capsule Take 1 capsule by mouth once a week 12 capsule 1    folic acid (FOLVITE) 1 MG tablet Take 1 tablet by mouth daily 90 tablet 1    tiotropium (SPIRIVA RESPIMAT) 2.5 MCG/ACT AERS inhaler Inhale 2 puffs into the lungs daily 1 Inhaler 11     No current facility-administered medications for this visit. Review of Systems   Constitutional: Positive for fatigue. Respiratory: Negative. Musculoskeletal: Positive for arthralgias and back pain. Neurological: Positive for weakness and numbness. Tingling        Objective:  General Appearance:  Uncomfortable and in pain. Vital signs: (most recent): Blood pressure 133/83, pulse 84, height 5' 5\" (1.651 m), weight 124 lb (56.2 kg), SpO2 97 %. Assessment & Plan       EXAMINATION: MRI OF THE LUMBAR SPINE WITHOUT CONTRAST, 7/23/2021 8:12 am    Impression 1. Acute compression deformity of the superior endplate of L5 with approximately 35% loss of height. No significant bulging of the posterior cortex. 2. No significant spinal canal stenosis of the lumbar spine. 3. Neural foraminal narrowing at L3-L4 through L5-S1. 4. Minimal retrolisthesis at L5-S1. L3-L4: There is a disc bulge contacting the ventral thecal sac. No significant spinal canal stenosis. Minimal left neural foraminal narrowing. No significant right neural foraminal narrowing. L4-L5: There is a disc bulge contacting the ventral thecal sac. No significant spinal canal stenosis. Facet arthrosis contributes to mild bilateral neural foraminal narrowing. L5-S1: There is a disc bulge with bilateral facet arthrosis contributing to mild bilateral neural foraminal narrowing.   No significant spinal canal stenosis     Pain History  Chronic pain involving multiple side along the spine extending from neck all the way to the lumbar area  Reports intermittent radiation of lower back pain down both legs  Report intermittent numbness in both feet  Neck pain is nonradicular and located in the cervical spine area  Described the pain as constant aching throbbing sharp sensation  Pain aggravated with routine activity  Nothing seems to alleviate the pain  No changes in bladder or bowel control  No previous spine surgical history  No previous spine injection history  No previous physical therapy for spine  Diagnostic work-up include plain films and CT cervical spine that did not show any significant pathology        1. Chronic bilateral low back pain with bilateral sciatica    2. Closed compression fracture of L5 lumbar vertebra, sequela    3. Intractable low back pain    4. Pathologic compression fracture of lumbar vertebra, sequela        Orders Placed This Encounter   Procedures    Case Request    DEXA VERTEBRAL FRACTURE ASSESSMENT    Ambulatory referral to Physical Therapy      No orders of the defined types were placed in this encounter.        MRI lumbar spine, MRI thoracic spine  Report reviewed  Images reviewed independently  Old healed multiple compression fracture in thoracic spine  New compression fracture with the significant height loss at L5 vertebrae   Continue to have pain 6 weeks after MRI at this time      Setting multiple previous fractures in the spine identified I suspect severe osteoporosis  We will obtain DEXA bone scan  Ongoing height loss with severe back pain refractory to conservative measure, I will recommend for kyphoplasty at L5    Follow-up in 2 weeks after kyphoplasty and bone scan    Electronically signed by Veronique Springer MD on 8/31/2021 at 2:25 PM

## 2021-09-01 DIAGNOSIS — M80.00XS OSTEOPOROSIS WITH CURRENT PATHOLOGICAL FRACTURE, UNSPECIFIED OSTEOPOROSIS TYPE, SEQUELA: Primary | ICD-10-CM

## 2021-09-02 ENCOUNTER — CLINICAL DOCUMENTATION (OUTPATIENT)
Dept: PSYCHOLOGY | Age: 51
End: 2021-09-02

## 2021-09-02 ENCOUNTER — HOSPITAL ENCOUNTER (OUTPATIENT)
Dept: PREADMISSION TESTING | Age: 51
Discharge: HOME | End: 2021-09-06
Payer: MEDICARE

## 2021-09-02 VITALS — BODY MASS INDEX: 20.83 KG/M2 | HEIGHT: 65 IN | WEIGHT: 125 LBS

## 2021-09-02 NOTE — PROGRESS NOTES
Pre-op Instructions For Out-Patient Surgery    Medication Instructions:  · Please stop herbs and any supplements now (includes vitamins and minerals). · Please contact your surgeon and prescribing physician for pre-op instructions for any blood thinners. None    · If you have inhalers/aerosol treatments at home, please use them the morning of your surgery and bring the inhalers with you to the hospital.    · Please take the following medications the morning of your surgery with a sip of water:    Inhaler, Tegretol, Lyrica, Topamax and Amlodipine. Surgery Instructions:  1. After midnight before surgery:  Do not eat or drink anything, including water, mints, gum, and hard candy. You may brush your teeth without swallowing. No smoking, chewing tobacco, or street drugs. 2. Please shower or bathe before surgery. 3. Please do not wear any cologne, lotion, powder, deodorant, jewelry, piercings, perfume, makeup, nail polish, hair accessories, or hair spray on the day of surgery. Wear loose comfortable clothing. 4. Leave your valuables at home. Bring a storage case for any glasses/contacts. 5. An adult who is responsible for you MUST drive you home and should be with you for the first 24 hours after surgery. 6. If having out-patient knee and foot surgeries, please arrange for planned crutches, walker, or wheelchair before arriving to the hospital.    The Day of Surgery:  · Arrive at [facility] Surgery Entr[facility] at the time directed by your surgeon and check in at the desk. · If you have a living will or healthcare power of , please bring a copy. · You will be taken to the pre-op holding area where you will be prepared for surgery. A physical assessment will be performed by a nurse practitioner or house officer.   Your IV will be started and you will meet your anesthesiologist.    · When you go to surgery, your family will be directed to the surgical waiting room, where the doctor should speak with them after your surgery. · After surgery, you will be taken to the recovery room then when you are awake and stable you will go to the short stay unit for preparation to be discharged. Only your one designated person is allowed to come to short stay for your discharge. · If you use a Bi-PAP or C-PAP machine, please bring it with you and leave it in the car in case it is needed in recovery room. Kaz Gomez will bring her Covid-19 vaccination card with her to her procedure with Dr. Franchesca Lee on 9/8/21. Instructions read to Kaz Gomez and understanding verbalized.

## 2021-09-02 NOTE — PROGRESS NOTES
Warm handoff was completed. Spoke with patient on the phone to discuss behavioral health services and her upcoming appointment (Sept 24). She was previously seen by a [facility] in May 2021 and was given referrals at that time for longer term psychotherapy. Patient reported that she has continued to experience depression and anxiety since then. She stated that she has a history of PTSD and is hoping to focus her treatment on these symptoms. She reported that she would like to meet for weekly, long-term psychotherapy. Informed her that is not available within this clinic but offered her a referral to the trauma recovery center, which she accepted. Answered questions about this and informed her that she is welcome to schedule with this [facility] if she changes her mind.

## 2021-09-03 RX ORDER — CARBAMAZEPINE 200 MG/1
200 TABLET ORAL 3 TIMES DAILY
Qty: 90 TABLET | Refills: 0 | Status: SHIPPED | OUTPATIENT
Start: 2021-09-03 | End: 2021-10-06

## 2021-09-03 RX ORDER — SPIRONOLACTONE 50 MG/1
50 TABLET, FILM COATED ORAL DAILY
Qty: 30 TABLET | Refills: 0 | Status: SHIPPED | OUTPATIENT
Start: 2021-09-03 | End: 2021-10-06

## 2021-09-03 RX ORDER — TOPIRAMATE 50 MG/1
50 TABLET, FILM COATED ORAL 2 TIMES DAILY
Qty: 60 TABLET | Refills: 0 | Status: SHIPPED | OUTPATIENT
Start: 2021-09-03 | End: 2021-10-06

## 2021-09-03 NOTE — TELEPHONE ENCOUNTER
Patient called asking if she needs to continue taking:  -Topamax 50 mg  -spironolactone 50 mg   -Carbamazepine 200 mg    If patient is to continue meds, she will need them refilled

## 2021-09-07 ENCOUNTER — ANESTHESIA EVENT (OUTPATIENT)
Dept: ENDOSCOPY | Age: 51
End: 2021-09-07
Payer: MEDICARE

## 2021-09-07 ENCOUNTER — CLINICAL DOCUMENTATION (OUTPATIENT)
Dept: PSYCHIATRY | Age: 51
End: 2021-09-07

## 2021-09-07 ENCOUNTER — OFFICE VISIT (OUTPATIENT)
Dept: NEUROLOGY | Age: 51
End: 2021-09-07
Payer: MEDICARE

## 2021-09-07 VITALS
DIASTOLIC BLOOD PRESSURE: 80 MMHG | HEIGHT: 65 IN | WEIGHT: 123 LBS | BODY MASS INDEX: 20.49 KG/M2 | SYSTOLIC BLOOD PRESSURE: 110 MMHG | HEART RATE: 94 BPM

## 2021-09-07 DIAGNOSIS — G40.909 SEIZURE DISORDER (HCC): Primary | ICD-10-CM

## 2021-09-07 DIAGNOSIS — G62.9 PERIPHERAL POLYNEUROPATHY: ICD-10-CM

## 2021-09-07 DIAGNOSIS — F10.11 HISTORY OF ALCOHOL ABUSE: ICD-10-CM

## 2021-09-07 DIAGNOSIS — E67.8 EXCESSIVE VITAMIN B6 INTAKE: ICD-10-CM

## 2021-09-07 PROCEDURE — 1111F DSCHRG MED/CURRENT MED MERGE: CPT | Performed by: PSYCHIATRY & NEUROLOGY

## 2021-09-07 PROCEDURE — 99214 OFFICE O/P EST MOD 30 MIN: CPT | Performed by: PSYCHIATRY & NEUROLOGY

## 2021-09-07 PROCEDURE — G8427 DOCREV CUR MEDS BY ELIG CLIN: HCPCS | Performed by: PSYCHIATRY & NEUROLOGY

## 2021-09-07 PROCEDURE — G8420 CALC BMI NORM PARAMETERS: HCPCS | Performed by: PSYCHIATRY & NEUROLOGY

## 2021-09-07 PROCEDURE — 4004F PT TOBACCO SCREEN RCVD TLK: CPT | Performed by: PSYCHIATRY & NEUROLOGY

## 2021-09-07 PROCEDURE — 3017F COLORECTAL CA SCREEN DOC REV: CPT | Performed by: PSYCHIATRY & NEUROLOGY

## 2021-09-07 NOTE — PROGRESS NOTES
Endocrinology []Excessive thirst  []Excessive hunger   Psychiatric [x] Anxiety/Depression; denied suicidal ideations  [] Hallucination   Allergy/immunology []Hives/environmental allergies   Hematologic/lymph [] Abnormal bleeding  [] Abnormal bruising       Current Outpatient Medications on File Prior to Visit   Medication Sig Dispense Refill    topiramate (TOPAMAX) 50 MG tablet Take 1 tablet by mouth 2 times daily 60 tablet 0    spironolactone (ALDACTONE) 50 MG tablet Take 1 tablet by mouth daily 30 tablet 0    carBAMazepine (TEGRETOL) 200 MG tablet Take 1 tablet by mouth 3 times daily 90 tablet 0    tiZANidine (ZANAFLEX) 4 MG tablet       simethicone (MYLICON) 80 MG chewable tablet Take 1 tablet by mouth 4 times daily as needed for Flatulence 180 tablet 3    hydrOXYzine (ATARAX) 25 MG tablet Take 1 tablet by mouth 3 times daily as needed for Anxiety 90 tablet 3    metoclopramide (REGLAN) 5 MG tablet Take 1 tablet by mouth 3 times daily 120 tablet 2    omeprazole (PRILOSEC) 40 MG delayed release capsule Take 1 capsule by mouth 2 times daily 60 capsule 2    lipase-protease-amylase (CREON) 02740-20036 units delayed release capsule Take 1 capsule by mouth 3 times daily (with meals) 90 capsule 1    busPIRone (BUSPAR) 30 MG tablet Take 30 mg by mouth 2 times daily (with meals) 60 tablet 0    traZODone (DESYREL) 50 MG tablet Take 1 tablet by mouth nightly as needed for Sleep 30 tablet 0    escitalopram (LEXAPRO) 5 MG tablet Take 1 tablet by mouth daily 30 tablet 1    acamprosate (CAMPRAL) 333 MG tablet Take 2 tablets by mouth 3 times daily 180 tablet 0    nicotine (NICODERM CQ) 21 MG/24HR Place 1 patch onto the skin daily 30 patch 0    pregabalin (LYRICA) 200 MG capsule Take 1 capsule by mouth 3 times daily for 90 days.  90 capsule 2    KLOR-CON M20 20 MEQ extended release tablet TAKE ONE TABLET BY MOUTH DAILY 30 tablet 2    amLODIPine (NORVASC) 5 MG tablet TAKE ONE TABLET BY MOUTH DAILY 90 tablet 0    baclofen (LIORESAL) 10 MG tablet Take 1 tablet by mouth 3 times daily 60 tablet 1    ferrous sulfate (IRON 325) 325 (65 Fe) MG tablet Take 1 tablet by mouth 2 times daily 180 tablet 1    rOPINIRole (REQUIP) 1 MG tablet Take 1 tablet by mouth nightly 90 tablet 1    budesonide-formoterol (SYMBICORT) 160-4.5 MCG/ACT AERO Inhale 2 puffs into the lungs 2 times daily 3 Inhaler 1    tiotropium (SPIRIVA RESPIMAT) 2.5 MCG/ACT AERS inhaler Inhale 2 puffs into the lungs daily 1 Inhaler 11    sodium chloride (ALTAMIST SPRAY) 0.65 % nasal spray 1 spray by Nasal route as needed for Congestion 1 Bottle 3    albuterol sulfate HFA (VENTOLIN HFA) 108 (90 Base) MCG/ACT inhaler Inhale 2 puffs into the lungs 4 times daily as needed for Wheezing 1 Inhaler 5    vitamin B-1 (THIAMINE) 100 MG tablet Take 1 tablet by mouth daily 30 tablet 3    vitamin D (ERGOCALCIFEROL) 95462 units CAPS capsule Take 1 capsule by mouth once a week 12 capsule 1    folic acid (FOLVITE) 1 MG tablet Take 1 tablet by mouth daily 90 tablet 1     No current facility-administered medications on file prior to visit. Allergies: Mariaelena Mcelroy has No Known Allergies.     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 Alyce Stringer ( last visit 11/20/2020 and note on chart )    Depression     bipolar, major depressive disorder, ptsd, anxiety    Dysphagia     GI bleed 10/2020    Hypertension     Memory loss     Oxygen dependent     pt stated not needed as of 12/9/2020    Pain, joint, ankle and foot     Pancreatic lesion 10/2020    Dr. Mark Casanova working up pt    Peripheral neuropathy     Seizures ([address]     also baseline tremors-last sz summer 2020    Tension headache     Under care of team 07/01/2021    neuro-Dr Wan[facility]-last visit june 2021    Under care of team 07/01/2021    pain management-Hamzah Martines-nery jimenez-last visit june 2021    Under care of team 07/01/2021    pulmonology-Dr Dueñas-[facility]-last virtual visit feb 2021    Under care of team 07/01/2021    psych-bahnfeldt NP-telemed-last visit may 2021    Under care of team 07/01/2021    ga-[address]-last visit june 2021    Wellness examination 07/01/2021    pcp-Ludivina grimes-last visit may 2021       Past Surgical History:   Procedure Laterality Date    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    COLONOSCOPY N/A 10/22/2020    COLONOSCOPY DIAGNOSTIC performed by Ilene Dumont MD at [facility]  7/28/2021    CT ABSCESS DRAIN SUBCUTANEOUS 7/28/2021 ST CT SCAN    ENDOSCOPIC ULTRASOUND (LOWER) N/A 12/9/2020    ENDOSCOPIC ULTRASOUND, UPPER WITH LINEAR SCOPE FOR BIOPSY OF MASS ON HEAD OF PANCREAS performed by Christy Dunbar MD at [facility] ERCP  08/11/2021    ERCP N/A 8/11/2021    ERCP STENT INSERTION performed by Chantal Richardson MD at [facility]    ERCP  8/11/2021    ERCP SPHINCTER/PAPILLOTOMY performed by Chantal Richardson MD at [facility] 7-3-13    ORIF tibial plateau    FRACTURE SURGERY Right     small finger metacarpal fracture    HAND SURGERY      pins    HYSTERECTOMY  2003    UPPER GASTROINTESTINAL ENDOSCOPY N/A 10/22/2020    EGD BIOPSY performed by Ilene Dumont MD at [facility] 4/5/2021    EGD BIOPSY performed by Ilene Dmuont MD at [facility]     Social History: Giuseppe Hobbs  reports that she has been smoking cigarettes.  She has a 15.00 pack-year smoking history. She has never used smokeless tobacco. She reports previous alcohol use. She reports current drug use. Frequency: 2.00 times per week. Drug: Marijuana. Family History   Problem Relation Age of Onset    Other Father     Cancer Maternal Grandmother     Heart Disease Paternal Grandmother     Esophageal Cancer Maternal Aunt      On exam: Blood pressure 110/80, pulse 94, height 5' 5\" (1.651 m), weight 123 lb (55.8 kg). NEUROLOGIC EXAMINATION  GENERAL  Appears comfortable and in no distress   HEENT  NC/ AT   NECK  Supple and no bruits heard   MENTAL STATUS:  Alert, oriented, intact memory, no confusion, normal speech, normal language, no hallucination or delusion   CRANIAL NERVES: II     -      PERRLA, Fundi reveal intact venous pulsations;  Visual fields intact to confrontation  III,IV,VI -  EOMs full, no afferent defect, no                      CAMMIE, no ptosis  V     -     Normal facial sensation  VII    -     Normal facial symmetry  VIII   -     Intact hearing  IX,X -     Symmetrical palate  XI    -     Symmetrical shoulder shrug  XII   -     Midline tongue, no atrophy    MOTOR FUNCTION:  significant for good strength of grade 5/5 in bilateral proximal and distal muscle groups of both upper and lower extremities with normal bulk, normal tone and no involuntary movements, no tremor   SENSORY FUNCTION:  diminished PP, temp and vibration in distal lower extremities    CEREBELLAR FUNCTION:  Intact fine motor control over upper limbs   REFLEX FUNCTION:  Symmetric, no perverted reflex, no Babinski sign   STATION and GAIT  Normal station, wide based gait     Diagnostic data reviewed with the patient:   Lab Results   Component Value Date    SEDRATE 37 (H) 02/03/2021     Lab Results   Component Value Date    URCEQPFX59 699 02/03/2021    VITAMINB6 184.4 (H) 07/08/2021      Lab Results   Component Value Date    FOLATE >20.0 02/03/2021     Lab Results   Component Value Date    VITD25 55.7 07/08/2021     Lab Results   Component Value Date    BRADLEY NEGATIVE 04/13/2021     Lab Results   Component Value Date    TSH 0.68 12/23/2020       Lab Results   Component Value Date    LABA1C 5.5 04/13/2021       EEG 7/29/21:  abnormal EEG secondary to generalized slowing without focal or  paroxysmal abnormality. Carbamazepine (7/25/2021): Total level 7.7 (4-12). ,  Free level: 2.3        Impression and Plan: Ms. Andre Delgado is a 45 y.o. female with   Sz Dis., hx of ETOH; migraine ha; peripheral neuropathy; abnl lab with elevated Vit B6. Most of her seizures were related to ETOH; wondering about continuation of CBZ; last sz was in March 2020; will discuss about CBZ downward taper trial in March 2022; until then, she needs to continue  tid. Migraine headaches: stable on Topmax 50 bid  Peripheral neuropathy: stable on Pregabalin 200 tid  Elevated Vit B6 level: Discussion done about stopping any supplemental vitamin B6. Since patient stated that she has not been taking any multivitamin tablets; discussion done about foods high in B6 such as peanuts, oats, soy beans, bananas, pork and poultry, etc.  To repeat level in 4 to 6 weeks. Follow up in 3 months. Please note that portions of this note were completed with a voice recognition program. Although every effort was made to ensure the accuracy of this automated transcription, some errors in transcription may have occurred; occasionally words are mis-transcribed. Thank you for understanding.

## 2021-09-08 ENCOUNTER — HOSPITAL ENCOUNTER (OUTPATIENT)
Age: 51
Setting detail: OUTPATIENT SURGERY
Discharge: HOME | End: 2021-09-08
Attending: INTERNAL MEDICINE | Admitting: INTERNAL MEDICINE
Payer: MEDICARE

## 2021-09-08 ENCOUNTER — TELEPHONE (OUTPATIENT)
Dept: GASTROENTEROLOGY | Age: 51
End: 2021-09-08

## 2021-09-08 ENCOUNTER — ANESTHESIA (OUTPATIENT)
Dept: ENDOSCOPY | Age: 51
End: 2021-09-08
Payer: MEDICARE

## 2021-09-08 VITALS
OXYGEN SATURATION: 91 % | HEART RATE: 79 BPM | HEIGHT: 65 IN | BODY MASS INDEX: 20.83 KG/M2 | WEIGHT: 125 LBS | RESPIRATION RATE: 14 BRPM | TEMPERATURE: 96.8 F | SYSTOLIC BLOOD PRESSURE: 135 MMHG | DIASTOLIC BLOOD PRESSURE: 85 MMHG

## 2021-09-08 VITALS — OXYGEN SATURATION: 89 % | SYSTOLIC BLOOD PRESSURE: 119 MMHG | DIASTOLIC BLOOD PRESSURE: 74 MMHG

## 2021-09-08 PROCEDURE — 7100000030 HC ASPR PHASE II RECOVERY - FIRST 15 MIN: Performed by: INTERNAL MEDICINE

## 2021-09-08 PROCEDURE — 2580000003 HC RX 258: Performed by: ANESTHESIOLOGY

## 2021-09-08 PROCEDURE — 2500000003 HC RX 250 WO HCPCS: Performed by: NURSE ANESTHETIST, CERTIFIED REGISTERED

## 2021-09-08 PROCEDURE — 3700000000 HC ANESTHESIA ATTENDED CARE: Performed by: INTERNAL MEDICINE

## 2021-09-08 PROCEDURE — 94640 AIRWAY INHALATION TREATMENT: CPT

## 2021-09-08 PROCEDURE — 94760 N-INVAS EAR/PLS OXIMETRY 1: CPT

## 2021-09-08 PROCEDURE — 3609018500 HC EGD US SCOPE W/ADJACENT STRUCTURES: Performed by: INTERNAL MEDICINE

## 2021-09-08 PROCEDURE — 6360000002 HC RX W HCPCS: Performed by: NURSE ANESTHETIST, CERTIFIED REGISTERED

## 2021-09-08 PROCEDURE — 7100000000 HC PACU RECOVERY - FIRST 15 MIN: Performed by: INTERNAL MEDICINE

## 2021-09-08 PROCEDURE — 6370000000 HC RX 637 (ALT 250 FOR IP): Performed by: NURSE PRACTITIONER

## 2021-09-08 PROCEDURE — 3700000001 HC ADD 15 MINUTES (ANESTHESIA): Performed by: INTERNAL MEDICINE

## 2021-09-08 PROCEDURE — 43259 EGD US EXAM DUODENUM/JEJUNUM: CPT | Performed by: INTERNAL MEDICINE

## 2021-09-08 PROCEDURE — 7100000031 HC ASPR PHASE II RECOVERY - ADDTL 15 MIN: Performed by: INTERNAL MEDICINE

## 2021-09-08 PROCEDURE — 2709999900 HC NON-CHARGEABLE SUPPLY: Performed by: INTERNAL MEDICINE

## 2021-09-08 PROCEDURE — 7100000001 HC PACU RECOVERY - ADDTL 15 MIN: Performed by: INTERNAL MEDICINE

## 2021-09-08 RX ORDER — SODIUM CHLORIDE 9 MG/ML
25 INJECTION, SOLUTION INTRAVENOUS PRN
Status: DISCONTINUED | OUTPATIENT
Start: 2021-09-08 | End: 2021-09-08 | Stop reason: HOSPADM

## 2021-09-08 RX ORDER — OXYCODONE HYDROCHLORIDE AND ACETAMINOPHEN 5; 325 MG/1; MG/1
1 TABLET ORAL PRN
Status: DISCONTINUED | OUTPATIENT
Start: 2021-09-08 | End: 2021-09-08 | Stop reason: HOSPADM

## 2021-09-08 RX ORDER — LIDOCAINE HYDROCHLORIDE 10 MG/ML
1 INJECTION, SOLUTION EPIDURAL; INFILTRATION; INTRACAUDAL; PERINEURAL
Status: DISCONTINUED | OUTPATIENT
Start: 2021-09-08 | End: 2021-09-08 | Stop reason: HOSPADM

## 2021-09-08 RX ORDER — SODIUM CHLORIDE, SODIUM LACTATE, POTASSIUM CHLORIDE, CALCIUM CHLORIDE 600; 310; 30; 20 MG/100ML; MG/100ML; MG/100ML; MG/100ML
INJECTION, SOLUTION INTRAVENOUS CONTINUOUS
Status: DISCONTINUED | OUTPATIENT
Start: 2021-09-08 | End: 2021-09-08 | Stop reason: HOSPADM

## 2021-09-08 RX ORDER — OXYCODONE HYDROCHLORIDE AND ACETAMINOPHEN 5; 325 MG/1; MG/1
2 TABLET ORAL PRN
Status: DISCONTINUED | OUTPATIENT
Start: 2021-09-08 | End: 2021-09-08 | Stop reason: HOSPADM

## 2021-09-08 RX ORDER — DIPHENHYDRAMINE HYDROCHLORIDE 50 MG/ML
12.5 INJECTION INTRAMUSCULAR; INTRAVENOUS
Status: DISCONTINUED | OUTPATIENT
Start: 2021-09-08 | End: 2021-09-08 | Stop reason: HOSPADM

## 2021-09-08 RX ORDER — LABETALOL HYDROCHLORIDE 5 MG/ML
5 INJECTION, SOLUTION INTRAVENOUS EVERY 10 MIN PRN
Status: DISCONTINUED | OUTPATIENT
Start: 2021-09-08 | End: 2021-09-08 | Stop reason: HOSPADM

## 2021-09-08 RX ORDER — ONDANSETRON 2 MG/ML
4 INJECTION INTRAMUSCULAR; INTRAVENOUS
Status: DISCONTINUED | OUTPATIENT
Start: 2021-09-08 | End: 2021-09-08 | Stop reason: HOSPADM

## 2021-09-08 RX ORDER — IPRATROPIUM BROMIDE AND ALBUTEROL SULFATE 2.5; .5 MG/3ML; MG/3ML
1 SOLUTION RESPIRATORY (INHALATION) ONCE
Status: COMPLETED | OUTPATIENT
Start: 2021-09-08 | End: 2021-09-08

## 2021-09-08 RX ORDER — PROPOFOL 10 MG/ML
INJECTION, EMULSION INTRAVENOUS CONTINUOUS PRN
Status: DISCONTINUED | OUTPATIENT
Start: 2021-09-08 | End: 2021-09-08 | Stop reason: SDUPTHER

## 2021-09-08 RX ORDER — SODIUM CHLORIDE 0.9 % (FLUSH) 0.9 %
10 SYRINGE (ML) INJECTION PRN
Status: DISCONTINUED | OUTPATIENT
Start: 2021-09-08 | End: 2021-09-08 | Stop reason: HOSPADM

## 2021-09-08 RX ORDER — PROPOFOL 10 MG/ML
INJECTION, EMULSION INTRAVENOUS PRN
Status: DISCONTINUED | OUTPATIENT
Start: 2021-09-08 | End: 2021-09-08 | Stop reason: SDUPTHER

## 2021-09-08 RX ORDER — LIDOCAINE HYDROCHLORIDE 10 MG/ML
INJECTION, SOLUTION EPIDURAL; INFILTRATION; INTRACAUDAL; PERINEURAL PRN
Status: DISCONTINUED | OUTPATIENT
Start: 2021-09-08 | End: 2021-09-08 | Stop reason: SDUPTHER

## 2021-09-08 RX ORDER — SODIUM CHLORIDE 0.9 % (FLUSH) 0.9 %
10 SYRINGE (ML) INJECTION EVERY 12 HOURS SCHEDULED
Status: DISCONTINUED | OUTPATIENT
Start: 2021-09-08 | End: 2021-09-08 | Stop reason: HOSPADM

## 2021-09-08 RX ADMIN — IPRATROPIUM BROMIDE AND ALBUTEROL SULFATE 1 AMPULE: .5; 3 SOLUTION RESPIRATORY (INHALATION) at 09:32

## 2021-09-08 RX ADMIN — LIDOCAINE HYDROCHLORIDE 50 MG: 10 INJECTION, SOLUTION EPIDURAL; INFILTRATION; INTRACAUDAL; PERINEURAL at 10:29

## 2021-09-08 RX ADMIN — PROPOFOL 50 MG: 10 INJECTION, EMULSION INTRAVENOUS at 10:29

## 2021-09-08 RX ADMIN — PROPOFOL 30 MG: 10 INJECTION, EMULSION INTRAVENOUS at 10:35

## 2021-09-08 RX ADMIN — PROPOFOL 125 MCG/KG/MIN: 10 INJECTION, EMULSION INTRAVENOUS at 10:30

## 2021-09-08 RX ADMIN — SODIUM CHLORIDE, POTASSIUM CHLORIDE, SODIUM LACTATE AND CALCIUM CHLORIDE: 600; 310; 30; 20 INJECTION, SOLUTION INTRAVENOUS at 10:06

## 2021-09-08 ASSESSMENT — PAIN - FUNCTIONAL ASSESSMENT: PAIN_FUNCTIONAL_ASSESSMENT: 0-10

## 2021-09-08 ASSESSMENT — PULMONARY FUNCTION TESTS
PIF_VALUE: 1

## 2021-09-08 ASSESSMENT — LIFESTYLE VARIABLES: SMOKING_STATUS: 1

## 2021-09-08 ASSESSMENT — PAIN SCALES - GENERAL
PAINLEVEL_OUTOF10: 0
PAINLEVEL_OUTOF10: 0

## 2021-09-08 NOTE — H&P
HISTORY and [facility] [id number]       NAME:  Kin Khan  MRN: [id number]   YOB: 1970   Date: 9/8/2021   Age: 45 y.o. Gender: female       COMPLAINT AND PRESENT HISTORY:     Kin Khan is 45 y.o.,  female, here for [address], LINEAR SCOPE. Patient was seen in office with Dr. Rowena Miranda, GI;  See note below for more history:  GI FOLLOW UP     INTERVAL HISTORY:   Recent complicated hospital course identified by a possible peripancreatic fluid collection in the setting of either pancreatic duct leak versus acute on chronic pancreatitis related to chronic pancreatitis and PD stricturing  Status post ERCP with pancreatic duct stent  Underwent percutaneous drain peripancreatic fluid with recent removal  Clinically doing well from GI standpoint with mild unintentional weight loss  Here for evaluation after discharge   HISTORY OF PRESENT ILLNESS: Ms.Heather TIANNA White is a 45 y.o. female with a past history remarkable for hypertension, depression, COPD, alcoholism with dependency and recent mission for withdrawal symptoms, additionally admitted for intoxication COPD exacerbation identified to have anemia with fecal occult positive results, underwent EGD and colonoscopy (poor prep) that revealed no obvious upper GI sources of the patient's anemia however did reveal severe condyloma involving the anorectal region and general region.  Patient was referred to colorectal surgery and is undergoing evaluation to have her severe condyloma acuminata removed and treated for.     Patient will need a repeat colonoscopy with extended bowel prep when she is treated for condyloma soon as possible as she has a residual flat polyps identified on her index colonoscopy that need to be resected.     Is unlikely the patient is unable to tolerate bowel prep given that she has endoscopic evidence of severe gastroparesis. [facility] most recent upper endoscopy revealed significant to moderate amount of residual gastric food undigested suggestive of gastroparesis     Repeat upper endoscopy was performed abnormal esophagram which revealed a possible intraluminal narrowing in the midesophagus possible related to aortic arch compression.  EGD revealed possible extraluminal narrowing in the midesophagus but no intraluminal lesions that were obvious.  Stomach again was filled with partially digested food suggestive of gastroparesis versus nonadherence to dietary instructions versus medication induced poor contractility     Currently the patient reports a difficulty initiating swallowing in the oropharyngeal region.  Does not report any globus sensation but does not report any esophageal symptoms     CT of the chest was negative for any extraluminal compression of the midesophagus  Swallowing study was unremarkable  Emptying test identified evidence of gastroparesis  PD stricture likely in the setting of chronic pancreatitis status post CBD stent placement on August 11, 2021    Patient is present appears very lethargic, sleeping at interview, she has a loose coarse cough, she complains of present TINAJERO. Pt denies any present stomach pain. No nausea or vomiting. Patient reports that diarrhea is a constant in her life. Patient states that she is not eating well due to her pancreatitis and gastroparesis . Patient is a long smoker. Pt complains of bladder spasms and low production. Patient has multiple comorbidities. She denies any alcohol drinking. Patient is on home 02 at 3L. COPD - on MDI. Patient has been NPO since midnight. No blood thinners in the past 5-7 days. Patient denies any personal or family problems with anesthesia.        PAST MEDICAL HISTORY     Past Medical History:   Diagnosis Date    Acute respiratory failure with hypoxia ([address] 10/16/2020    Alcohol withdrawal syndrome, with delirium ([address] 12/14/2019    Alcoholism ([address]     Anemia 10/2020    GI bleed    Astrocytoma ([address] - 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24, the patient underwent 2 surgical resections without known recurrence 10/23/2020    Closed fracture of lateral portion of left tibial plateau 62/40/7100    COPD (chronic obstructive pulmonary disease) (HCC)     CO2 retainer, on Bipap at night for this, Dr. Jd Mancia ( last visit 11/20/2020 and note on chart )    Depression     bipolar, major depressive disorder, ptsd, anxiety    Dysphagia     GI bleed 10/2020    Hypertension     Memory loss     Oxygen dependent     pt stated not needed as of 12/9/2020    Pain, joint, ankle and foot     Pancreatic lesion 10/2020    Dr. Aury Allen working up pt    Peripheral neuropathy     Seizures ([address]     also baseline tremors-last sz summer 2020    Tension headache     Under care of team 07/01/2021    neuro-Dr Wan-[facility]-last visit june 2021    Under care of team 07/01/2021    pain management-Hamzah Martines-nery jimenez-last visit june 2021    Under care of team 07/01/2021    pulmonology-Dr Dueñas-[facility]-last virtual visit feb 2021    Under care of team 07/01/2021    psych-bahnfeldt NP-telemed-last visit may 2021    Under care of team 07/01/2021    ic-[address]-last visit june 2021    Wellness examination 07/01/2021    [facility] 2021       SURGICAL HISTORY       Past Surgical History:   Procedure Laterality Date    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    COLONOSCOPY N/A 10/22/2020    COLONOSCOPY DIAGNOSTIC performed by Zion Poole MD at [address]  7/28/2021    CT ABSCESS DRAIN SUBCUTANEOUS 7/28/2021 [facility] CT SCAN    ENDOSCOPIC ULTRASOUND (LOWER) N/A 12/9/2020    ENDOSCOPIC ULTRASOUND, UPPER WITH LINEAR SCOPE FOR BIOPSY OF MASS ON HEAD OF PANCREAS performed by Melecio Stewart MD at [address] [facility] ERCP  08/11/2021    ERCP N/A 8/11/2021    ERCP STENT INSERTION performed by Torin Eli MD at  [address] [address] ERCP  8/11/2021    ERCP SPHINCTER/PAPILLOTOMY performed by Jennifer Amaral MD at [address] [place] Left 7-3-13    ORIF tibial plateau    FRACTURE SURGERY Right     small finger metacarpal fracture    HAND SURGERY      pins    HYSTERECTOMY  2003    UPPER GASTROINTESTINAL ENDOSCOPY N/A 10/22/2020    EGD BIOPSY performed by Myrtle Lancaster MD at [address] [place] [address] N/A 4/5/2021    EGD BIOPSY performed by Myrtle Lancaster MD at [facility] Endoscopy       FAMILY HISTORY       Family History   Problem Relation Age of Onset    Other Father     Cancer Maternal Grandmother     Heart Disease Paternal Grandmother     Esophageal Cancer Maternal Aunt        SOCIAL HISTORY       Social History     Socioeconomic History    Marital status: Single     Spouse name: Not on file    Number of children: Not on file    Years of education: Not on file    Highest education level: Not on file   Occupational History    Not on file   Tobacco Use    Smoking status: Current Every Day Smoker     Packs/day: 0.50     Years: 30.00     Pack years: 15.00     Types: Cigarettes    Smokeless tobacco: Never Used    Tobacco comment: plans on quitting in the future    Vaping Use    Vaping Use: Never used   Substance and Sexual Activity    Alcohol use: Not Currently     Alcohol/week: 0.0 standard drinks    Drug use: Yes     Frequency: 2.0 times per week     Types: Marijuana    Sexual activity: Not Currently   Other Topics Concern    Not on file   Social History Narrative    Not on file     Social Determinants of Health     Financial Resource Strain: Medium Risk    Difficulty of Paying Living Expenses: Somewhat hard   Food Insecurity: No Food Insecurity    Worried About Running Out of Food in the Last Year: Never true    Tyler of Food in the Last Year: Never true   Transportation Needs:     Lack of Transportation (Medical):      Lack of Transportation (Non-Medical):    Physical Activity:     sulfate (IRON 325) 325 (65 Fe) MG tablet Take 1 tablet by mouth 2 times daily 180 tablet 1    rOPINIRole (REQUIP) 1 MG tablet Take 1 tablet by mouth nightly 90 tablet 1    budesonide-formoterol (SYMBICORT) 160-4.5 MCG/ACT AERO Inhale 2 puffs into the lungs 2 times daily 3 Inhaler 1    tiotropium (SPIRIVA RESPIMAT) 2.5 MCG/ACT AERS inhaler Inhale 2 puffs into the lungs daily 1 Inhaler 11    sodium chloride (ALTAMIST SPRAY) 0.65 % nasal spray 1 spray by Nasal route as needed for Congestion (Patient not taking: Reported on 9/7/2021) 1 Bottle 3    albuterol sulfate HFA (VENTOLIN HFA) 108 (90 Base) MCG/ACT inhaler Inhale 2 puffs into the lungs 4 times daily as needed for Wheezing 1 Inhaler 5    vitamin B-1 (THIAMINE) 100 MG tablet Take 1 tablet by mouth daily 30 tablet 3    vitamin D (ERGOCALCIFEROL) 51389 units CAPS capsule Take 1 capsule by mouth once a week 12 capsule 1    folic acid (FOLVITE) 1 MG tablet Take 1 tablet by mouth daily 90 tablet 1       Negative except for what is mentioned in the HPI. GENERAL PHYSICAL EXAM     Vitals :   See vital signs in RN flow sheet. GENERAL APPEARANCE:   Giuseppe Hobbs is 45 y.o.,  female, not obese, nourished, conscious, alert. Does not appear to be distress or pain at this time. SKIN:  Warm, dry, no cyanosis or jaundice. Nail beds yellowish, and curved under. HEAD:  Normocephalic, atraumatic, no swelling or tenderness. EYES:  Pupils equal, reactive to light. EARS:  No discharge, no marked hearing loss. NOSE:  No rhinorrhea, epistaxis or septal deformity. THROAT:  Not congested. No ulceration bleeding or discharge. NECK:  No stiffness, trachea central.  No palpable masses or L.N.                 CHEST:  Symmetrical and equal on expansion. HEART:  RRR S1 > S2. No audible murmurs or gallops.                  LUNGS:  Equal on expansion, Coarse loose coughing,  Coarse breath sounds anterior and posterior          ABDOMEN:    Soft on palpation. No dysphagia, No localized tenderness. No guarding or rigidity. No palpable hepatosplenomegaly. LYMPHATICS:  No palpable cervical lymphadenopathy. LOCOMOTOR, BACK AND SPINE:  No tenderness or deformities. EXTREMITIES:  Symmetrical, no pretibial edema. No discoloration or ulcerations. NEUROLOGIC:  The patient is conscious, alert, oriented,Cranial nerve II-XII intact, taste and smell were not examined. No apparent focal sensory or motor deficits.              PROVISIONAL DIAGNOSES / SURGERY:      PANCREATIC CYST    ENDOSCOPIC ULTRASOUND WITH STENT REMOVAL, LINEAR SCOPE    Patient Active Problem List    Diagnosis Date Noted    Sepsis ([facility] 08/09/2021    Acute recurrent pancreatitis 08/09/2021    Atelectasis of left lung 08/09/2021    Fluid collection of pancreas     Diverticulitis of large intestine without perforation or abscess with bleeding     Pseudocyst of pancreas     Bandemia     Alcohol-induced acute pancreatitis 07/31/2021    Community acquired pneumonia of left lower lobe of lung 07/29/2021    Septicemia ([address]     Metabolic acidosis with increased anion gap and accumulation of organic acids 07/28/2021    Compression fracture of thoracic vertebra (HCC)     Compression of lumbar vertebra (HCC)     Closed fracture of fifth thoracic vertebra ([address] 07/22/2021    Diverticulitis of large intestine with abscess without bleeding 07/22/2021    Elevated brain natriuretic peptide (BNP) level 07/22/2021    Elevated alkaline phosphatase level 07/22/2021    Chronic pancreatitis ([address] 07/22/2021    Erythema ab igne 07/22/2021    Severe sepsis (HCC) 07/21/2021    Multilevel spine pain 06/30/2021    Chronic pain syndrome 06/30/2021    Marijuana abuse 06/30/2021    Chronic peripheral neuropathic pain 05/04/2021    History of alcohol abuse 05/04/2021  History of petit-mal seizures 05/04/2021    Intractable episodic tension-type headache 05/04/2021    Personal history of astrocytoma 05/04/2021    Esophageal dysphagia     Major depressive disorder, recurrent severe without psychotic features ([address] 03/31/2021    AMAN (generalized anxiety disorder) 03/22/2021    Perineal mass, female 10/23/2020    Esophagitis candidal  10/23/2020    Condyloma 10/23/2020    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24, the patient underwent 2 surgical resections without known recurrence 10/23/2020    Alcohol use disorder, severe, in early remission ([address] 31/36/3221    Metabolic encephalopathy     Recurrent major depressive disorder ([address] 10/20/2020    Elevated CA 19-9 level 10/20/2020    Pancreatic cyst 10/19/2020    Acute respiratory failure with hypoxia ([address] 10/16/2020    Paresthesia of lower extremity 10/13/2020    COPD with acute exacerbation ([address] 10/12/2020    Seizure disorder ([address]     Ambulatory dysfunction 12/17/2019    Tobacco use     Hypomagnesemia 00/61/4097    Alcoholic peripheral neuropathy ([address] 12/14/2019    Hypokalemia 12/14/2019    Macrocytic anemia 12/14/2019    Psychophysiological insomnia 06/05/2019    Anxiety 06/05/2019    Essential hypertension 08/13/2015    Nicotine addiction 08/13/2015    Reflex sympathetic dystrophy of lower limb 03/27/2014    Acute bronchitis 10/02/2012           MYRON Jackson, LOI - CNP on 9/8/2021 at 8:34 AM

## 2021-09-08 NOTE — OP NOTE
[facility]     PROCEDURE DATE: 09/08/21    REFERRING PHYSICIAN: No ref. provider found     PRIMARY CARE PROVIDER: LIZBETH Rios MD    ATTENDING PHYSICIAN: Yakelin Cohen MD     HISTORY: Ms. Cinda Wilkerson is a 45 y.o. female who presents to the [place] [address] Endoscopy unit for EUS. The patient's clinical history is remarkable for recurrent pancreatitis. She has external drain. Pancreatic stent was placed recently for concern for pancreatic duct leak. Previously there was concern for pancreatic lesion. Biopsies were negative. She is currently medically stable and appropriate for the planned procedure. PREPROCEDURE DIAGNOSIS:  Chronic pancreatitis    POSTPROCEDURE DIAGNOSIS:  Severe calcifications in the head of the pancreas  Pancreatic duct stent noted  Calcifications in mid body with upstream dilation of pancreatic duct    PROCEDURES PERFORMED:   1. EUS. SPECIMENS: None      INSTRUMENTS:   1. Pentax Linear [id number] Echoendoscope      MEDICATIONS:   MAC as per anesthesia    EBL: minimal    PREPARATION: After the risks, benefits and alternatives of the procedure were discussed, informed consent was obtained. Complications were said to include, but were not limited to: medication allergy, medication reaction, cardiovascular and respiratory problems, bleeding, perforation, infection, pancreatitis, aspiration, and/or missed diagnosis. Following arrival in the endoscopy room, the patient was placed in the supine position and a final time-out was performed in the presence of the nursing staff. The patient was then sedated and intubated, and the examination was started. FINDINGS: The Echoendoscope was inserted into the oropharynx under direct visualization and advanced smoothly into the stomach where a small gastric pool was suctioned.  A cursory view of the gastric mucosa was normal. The scope was then further advanced through a patent pylorus to the second portion of the duodenum. Pancreas: Diffusely heterogenous pancreas seen in the examined pancreas. PD in the head was (could not be measured due to severe calcifications), in the body 2.7 mm and in the tail 2.7 mm in maximum cross sectional diameter. Severe calcifications were noted in the head of the pancreas obscuring visualization of pancreatic duct. Stent was noted in pancreatic duct up to neck/body. Pancreatic duct was traced from the neck into mid body were calcifications were noted inside the pancreatic duct. The pancreas duct could not be visualized in the segment of the body. Upstream from there the duct was more dilated up to 3 mm. I suspect the calcifications are inside the duct causing obstruction. CBD: Normal, no stones, sludge. CBD diameter: 5 mm. Gallbladder: Normal.      Ampulla: Normal.     Left lobe of liver: Normal      Lymphadenopathy: No pathologic lymphadenopathy seen in upper abdomen. RECOMMENDATIONS:   1. Follow up with Dr Taisha Rebolledo. [facility] Villa. 2. Consider referral to Dr. Cynthia Santos at [facility] for ESWL. 3.  I would recommend keeping pancreatic stent for another 6 weeks or so. In the meantime the patient can be evaluated for ESWL.         Harry Duffy

## 2021-09-08 NOTE — ANESTHESIA POSTPROCEDURE EVALUATION
Department of Anesthesiology  Postprocedure Note    Patient: Richy Patel  MRN: [id number]  YOB: 1970  Date of evaluation: 9/8/2021  Time:  12:47 PM     Procedure Summary     Date: 09/08/21 Room / Location: [address] [place] [address] ENDO 05 / [facility] ENDO    Anesthesia Start: 1024 Anesthesia Stop: 4149    Procedure: ENDOSCOPIC ULTRASOUND, LINEAR SCOPE (N/A ) Diagnosis:       (PANCREATIC CYST)      (PT FULLY VACCINATED)    Surgeons: Tanya Gomez MD Responsible Provider: Gisela Jeffery MD    Anesthesia Type: general ASA Status: 3          Anesthesia Type: general    Marcell Phase I: Marcell Score: 8    Marcell Phase II: Marcell Score: 9    Last vitals: Reviewed and per EMR flowsheets.        Anesthesia Post Evaluation    Comments: POST- ANESTHESIA EVALUATION       Pt Name: Richy Patel  MRN: [id number]  YOB: 1970  Date of evaluation: 9/8/2021  Time:  12:47 PM      /85   Pulse 79   Temp 96.8 °F (36 °C)   Resp 14   Ht 5' 5\" (1.651 m)   Wt 125 lb (56.7 kg)   SpO2 91%   BMI 20.80 kg/m²      Consciousness Level  Awake  Cardiopulmonary Status  Stable  Pain Adequately Treated YES  Nausea / Vomiting  NO  Adequate Hydration  YES  Anesthesia Related Complications NONE      Electronically signed by Gisela Jfefery MD on 9/8/2021 at 12:47 PM

## 2021-09-08 NOTE — ANESTHESIA PRE PROCEDURE
Department of Anesthesiology  Preprocedure Note       Name:  Courtney Tsai   Age:  45 y.o.  :  1970                                          MRN:  [id number]         Date:  2021      Surgeon: Nikki Simmons):  Anushka Beal MD    Procedure: Procedure(s):  ENDOSCOPIC ULTRASOUND WITH STENT REMOVAL, LINEAR SCOPE    Medications prior to admission:   Prior to Admission medications    Medication Sig Start Date End Date Taking?  Authorizing Provider   topiramate (TOPAMAX) 50 MG tablet Take 1 tablet by mouth 2 times daily 9/3/21  Yes Ludivina Tavarez MD   spironolactone (ALDACTONE) 50 MG tablet Take 1 tablet by mouth daily 9/3/21  Yes Ludivina Tavarez MD   carBAMazepine (TEGRETOL) 200 MG tablet Take 1 tablet by mouth 3 times daily 9/3/21  Yes Ludivina Tavarez MD   simethicone (MYLICON) 80 MG chewable tablet Take 1 tablet by mouth 4 times daily as needed for Flatulence 21  Yes Esme Hernandes MD   hydrOXYzine (ATARAX) 25 MG tablet Take 1 tablet by mouth 3 times daily as needed for Anxiety 21  Yes LOI Trevizo NP   metoclopramide (REGLAN) 5 MG tablet Take 1 tablet by mouth 3 times daily 21  Yes Esme Hernandes MD   omeprazole (PRILOSEC) 40 MG delayed release capsule Take 1 capsule by mouth 2 times daily 21  Yes Esme Hernandes MD   lipase-protease-amylase (CREON) 17682-74561 units delayed release capsule Take 1 capsule by mouth 3 times daily (with meals) 21  Yes Ludivina Tavarez MD   busPIRone (BUSPAR) 30 MG tablet Take 30 mg by mouth 2 times daily (with meals) 21  Yes LOI Trevizo NP   traZODone (DESYREL) 50 MG tablet Take 1 tablet by mouth nightly as needed for Sleep 21 Yes LOI Trevizo NP   escitalopram (LEXAPRO) 5 MG tablet Take 1 tablet by mouth daily 21  Yes LOI Trevizo NP   acamprosate (CAMPRAL) 333 MG tablet Take 2 tablets by mouth 3 times daily 21 Yes LOI Trevizo NP   pregabalin (LYRICA) 200 MG capsule Take 1 capsule by mouth 3 times daily for 90 days.  7/2/21 9/30/21 Yes Lizbeth Hobbs MD   KLOR-CON M20 20 MEQ extended release tablet TAKE ONE TABLET BY MOUTH DAILY 6/24/21  Yes Ludivina Tavarez MD   amLODIPine (NORVASC) 5 MG tablet TAKE ONE TABLET BY MOUTH DAILY 6/3/21  Yes Ludivina Tavarez MD   baclofen (LIORESAL) 10 MG tablet Take 1 tablet by mouth 3 times daily 5/25/21  Yes Dominguez Gomes MD   ferrous sulfate (IRON 325) 325 (65 Fe) MG tablet Take 1 tablet by mouth 2 times daily 4/22/21  Yes Ludivina Tavarez MD   rOPINIRole (REQUIP) 1 MG tablet Take 1 tablet by mouth nightly 4/1/21  Yes Ludivina Tavarez MD   budesonide-formoterol [facility]) 160-4.5 MCG/ACT AERO Inhale 2 puffs into the lungs 2 times daily 3/31/21  Yes Ludivina Tavarez MD   tiotropium (SPIRIVA RESPIMAT) 2.5 MCG/ACT AERS inhaler Inhale 2 puffs into the lungs daily 2/26/21 9/8/21 Yes Yana Scott MD   albuterol sulfate HFA (VENTOLIN HFA) 108 (90 Base) MCG/ACT inhaler Inhale 2 puffs into the lungs 4 times daily as needed for Wheezing 6/11/20  Yes Ludivina Tavarez MD   vitamin B-1 (THIAMINE) 100 MG tablet Take 1 tablet by mouth daily 7/12/19  Yes Ludivina Tavarez MD   vitamin D (ERGOCALCIFEROL) 67196 units CAPS capsule Take 1 capsule by mouth once a week 6/19/19  Yes Ludivina Tavarez MD   folic acid (FOLVITE) 1 MG tablet Take 1 tablet by mouth daily 6/19/19  Yes Ludivina Tavarez MD   tiZANidine (ZANAFLEX) 4 MG tablet  7/19/21   Historical Provider, MD   nicotine (NICODERM CQ) 21 MG/24HR Place 1 patch onto the skin daily  Patient not taking: Reported on 9/7/2021 8/4/21   [facility], APRN - NP   sodium chloride (ALTAMIST SPRAY) 0.65 % nasal spray 1 spray by Nasal route as needed for Congestion  Patient not taking: Reported on 9/7/2021 12/28/20   Ludivina Tavarez MD       Current medications:    Current Facility-Administered Medications   Medication Dose Route Frequency Provider Last Rate Last Admin    lactated ringers infusion   IntraVENous Continuous Aishwarya Saleh MD        sodium chloride flush 0.9 % injection 10 mL  10 mL IntraVENous 2 times per day Aishwarya Saleh MD        sodium chloride flush 0.9 % injection 10 mL  10 mL IntraVENous PRN Aishwarya Saleh MD        0.9 % sodium chloride infusion  25 mL IntraVENous PRN Aishwarya Saleh MD        lidocaine PF 1 % injection 1 mL  1 mL IntraDERmal Once PRN Aishwarya Saleh MD           Allergies:  No Known Allergies    Problem List:    Patient Active Problem List   Diagnosis Code    Acute bronchitis J20.9    Reflex sympathetic dystrophy of lower limb G90.529    Essential hypertension I10    Nicotine addiction F17.200    Psychophysiological insomnia F51.04    Anxiety R08.4    Alcoholic peripheral neuropathy (HCC) G62.1    Hypokalemia E87.6    Macrocytic anemia D53.9    Hypomagnesemia E83.42    Tobacco use Z72.0    Ambulatory dysfunction R26.2    Seizure disorder ([address] G40.909    COPD with acute exacerbation ([address] J44.1    Paresthesia of lower extremity R20.2    Acute respiratory failure with hypoxia (HCC) J96.01    Pancreatic cyst K86.2    Recurrent major depressive disorder (HCC) F33.9    Elevated CA 19-9 level R97.8    Perineal mass, female N90.89    Esophagitis candidal  B37.81    Condyloma A63.0    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24, the patient underwent 2 surgical resections without known recurrence C71.9    Alcohol use disorder, severe, in early remission ([address] N23.97    Metabolic encephalopathy F51.84    AMAN (generalized anxiety disorder) F41.1    Major depressive disorder, recurrent severe without psychotic features ([address] F33.2    Esophageal dysphagia R13.10    Chronic peripheral neuropathic pain M79.2, G89.29    History of alcohol abuse F10.11    History of petit-mal seizures Z86.69    Intractable episodic tension-type headache G44. [address] history of astrocytoma Z85.841    Multilevel spine pain M54.9    Chronic pain syndrome G89.4    Marijuana abuse F12.10    Severe sepsis ([facility]) A41.9, R65.20    Closed fracture of fifth thoracic vertebra ([facility]) S22.059A    Diverticulitis of large intestine with abscess without bleeding K57.20    Elevated brain natriuretic peptide (BNP) level R79.89    Elevated alkaline phosphatase level R74.8    Chronic pancreatitis ([facility]) K86.1    Erythema ab igne L59.0    Compression fracture of thoracic vertebra ([facility]) S22.000A    Compression of lumbar vertebra ([facility]) Y84.418U    Metabolic acidosis with increased anion gap and accumulation of organic acids E87.2    Community acquired pneumonia of left lower lobe of lung J18.9    Septicemia ([facility]) A41.9    Alcohol-induced acute pancreatitis K85.20    Fluid collection of pancreas K86.89    Diverticulitis of large intestine without perforation or abscess with bleeding K57.33    Pseudocyst of pancreas K86.3    Bandemia D72.825    Sepsis ([facility]) A41.9    Acute recurrent pancreatitis K85.90    Atelectasis of left lung J98.11       Past Medical History:        Diagnosis Date    Acute respiratory failure with hypoxia ([facility]) 10/16/2020    Alcohol withdrawal syndrome, with delirium ([address] 12/14/2019    Alcoholism ([address]     Anemia 10/2020    GI bleed    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24, the patient underwent 2 surgical resections without known recurrence 10/23/2020    Closed fracture of lateral portion of left tibial plateau 89/81/7454    COPD (chronic obstructive pulmonary disease) ([facility])     CO2 retainer, on Bipap at night for this, Dr. Yanci Hua ( last visit 11/20/2020 and note on chart )    Depression     bipolar, major depressive disorder, ptsd, anxiety    Dysphagia     GI bleed 10/2020    Hypertension     Memory loss     Oxygen dependent     pt stated not needed as of 12/9/2020    Pain, joint, ankle and foot     Pancreatic lesion 10/2020    Dr. Gurjit Batista working up pt    Peripheral neuropathy     Seizures ([address]     also baseline tremors-last sz summer 2020    Tension headache     Under care of team 07/01/2021    neuro-Dr [facility]-last visit june 2021    Under care of team 07/01/2021    pain management-Hamzah Martines-nery jimenez-last visit june 2021    Under care of team 07/01/2021    pulmonology-Dr [facility]-last virtual visit feb 2021    Under care of team 07/01/2021    psych-bahnfeldt NP-telemed-last visit may 2021    Under care of team 07/01/2021    j[address]-last visit june 2021    Wellness examination 07/01/2021    pcp-[address]-last visit may 2021       Past Surgical History:        Procedure Laterality Date    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    COLONOSCOPY N/A 10/22/2020    COLONOSCOPY DIAGNOSTIC performed by Randi Pond MD at [address]  7/28/2021    CT ABSCESS DRAIN SUBCUTANEOUS 7/28/2021 [facility] CT SCAN    ENDOSCOPIC ULTRASOUND (LOWER) N/A 12/9/2020    ENDOSCOPIC ULTRASOUND, UPPER WITH LINEAR SCOPE FOR BIOPSY OF MASS ON HEAD OF PANCREAS performed by Audrey Saha MD at [address] [facility]  08/11/2021    ERCP N/A 8/11/2021    ERCP STENT INSERTION performed by Reagan Darling MD at [facility]    ERCP  8/11/2021    ERCP SPHINCTER/PAPILLOTOMY performed by Reagan Darling MD at [address] 7-3-13    ORIF tibial plateau    FRACTURE SURGERY Right     small finger metacarpal fracture    HAND SURGERY      pins    HYSTERECTOMY  2003    UPPER GASTROINTESTINAL ENDOSCOPY N/A 10/22/2020    EGD BIOPSY performed by Randi Pond MD at [facility] N/A 4/5/2021    EGD BIOPSY performed by Randi Pond MD at [facility]       Social History:    Social History     Tobacco Use    Smoking status: Current Every Day Smoker     Packs/day: 0.50     Years: 30.00     Pack years: 15.00     Types: Cigarettes    Smokeless tobacco: Never Used    Tobacco comment: plans on quitting in the future    Substance Use Topics    Alcohol use: Not Currently     Alcohol/week: 0.0 standard drinks                                Ready to quit: Not Answered  Counseling given: Not Answered  Comment: plans on quitting in the future       Vital Signs (Current):   Vitals:    09/08/21 0932   BP: (!) 147/94   Pulse: 92   Resp: 22   Temp: 97.7 °F (36.5 °C)   TempSrc: Infrared   SpO2: 90%   Weight: 125 lb (56.7 kg)   Height: 5' 5\" (1.651 m)                                              BP Readings from Last 3 Encounters:   09/08/21 (!) 147/94   09/07/21 110/80   08/31/21 133/83       NPO Status:                                                                                 BMI:   Wt Readings from Last 3 Encounters:   09/08/21 125 lb (56.7 kg)   09/07/21 123 lb (55.8 kg)   09/02/21 125 lb (56.7 kg)     Body mass index is 20.8 kg/m². CBC:   Lab Results   Component Value Date    WBC 8.8 08/12/2021    RBC 2.88 08/12/2021    RBC 4.16 04/30/2012    HGB 8.5 08/12/2021    HCT 27.2 08/12/2021    MCV 94.4 08/12/2021    RDW 14.9 08/12/2021     08/12/2021     04/30/2012       CMP:   Lab Results   Component Value Date     08/12/2021    K 3.3 08/12/2021     08/12/2021    CO2 23 08/12/2021    BUN 3 08/12/2021    CREATININE 0.51 08/12/2021    GFRAA >60 08/12/2021    LABGLOM >60 08/12/2021    GLUCOSE 78 08/12/2021    GLUCOSE 92 04/30/2012    PROT 7.0 08/09/2021    CALCIUM 8.3 08/12/2021    BILITOT 0.22 08/09/2021    ALKPHOS 250 08/09/2021    AST 19 08/09/2021    ALT 10 08/09/2021       POC Tests: No results for input(s): POCGLU, POCNA, POCK, POCCL, POCBUN, POCHEMO, POCHCT in the last 72 hours.     Coags:   Lab Results   Component Value Date    PROTIME 10.9 08/09/2021    INR 1.0 08/09/2021    APTT 24.6 08/09/2021       HCG (If Applicable): No results found for: PREGTESTUR, PREGSERUM, HCG, HCGQUANT     ABGs: No results found for: PHART, PO2ART, DQB0RUJ, KOY0DXH, BEART, O3CUXVYL     Type & Screen

## 2021-09-08 NOTE — PROGRESS NOTES
Pt recd duoneb breathing tx. Placed on oxygen at 2lpm after treatment completed. Pt states she wears O2 at home at 2-3 lpm. SPO2 95-97% with oxygen. Pt denies any c/o of pain or discomfort at this time.

## 2021-09-09 ENCOUNTER — HOSPITAL ENCOUNTER (OUTPATIENT)
Dept: CT IMAGING | Age: 51
Discharge: HOME | End: 2021-09-11
Payer: MEDICARE

## 2021-09-09 DIAGNOSIS — R18.8 ABDOMINAL FLUID COLLECTION: ICD-10-CM

## 2021-09-09 PROCEDURE — 74178 CT ABD&PLV WO CNTR FLWD CNTR: CPT

## 2021-09-09 PROCEDURE — 6360000004 HC RX CONTRAST MEDICATION: Performed by: INTERNAL MEDICINE

## 2021-09-09 RX ADMIN — IOPAMIDOL 75 ML: 755 INJECTION, SOLUTION INTRAVENOUS at 15:20

## 2021-09-10 ENCOUNTER — TELEPHONE (OUTPATIENT)
Dept: PAIN MANAGEMENT | Age: 51
End: 2021-09-10

## 2021-09-10 ENCOUNTER — ANESTHESIA (OUTPATIENT)
Dept: OPERATING ROOM | Age: 51
End: 2021-09-10
Payer: MEDICARE

## 2021-09-10 ENCOUNTER — HOSPITAL ENCOUNTER (OUTPATIENT)
Age: 51
Setting detail: OUTPATIENT SURGERY
Discharge: HOME | End: 2021-09-10
Attending: ANESTHESIOLOGY | Admitting: ANESTHESIOLOGY
Payer: MEDICARE

## 2021-09-10 ENCOUNTER — APPOINTMENT (OUTPATIENT)
Dept: GENERAL RADIOLOGY | Age: 51
End: 2021-09-10
Attending: ANESTHESIOLOGY
Payer: MEDICARE

## 2021-09-10 ENCOUNTER — ANESTHESIA EVENT (OUTPATIENT)
Dept: OPERATING ROOM | Age: 51
End: 2021-09-10
Payer: MEDICARE

## 2021-09-10 VITALS — OXYGEN SATURATION: 100 % | DIASTOLIC BLOOD PRESSURE: 53 MMHG | SYSTOLIC BLOOD PRESSURE: 86 MMHG | TEMPERATURE: 98.4 F

## 2021-09-10 VITALS
TEMPERATURE: 97.5 F | RESPIRATION RATE: 17 BRPM | DIASTOLIC BLOOD PRESSURE: 92 MMHG | SYSTOLIC BLOOD PRESSURE: 144 MMHG | BODY MASS INDEX: 20.89 KG/M2 | HEIGHT: 65 IN | HEART RATE: 62 BPM | WEIGHT: 125.4 LBS | OXYGEN SATURATION: 94 %

## 2021-09-10 LAB
ANION GAP SERPL CALCULATED.3IONS-SCNC: 8 MMOL/L (ref 9–17)
BUN BLDV-MCNC: 4 MG/DL (ref 6–20)
BUN/CREAT BLD: 9 (ref 9–20)
CALCIUM SERPL-MCNC: 9.4 MG/DL (ref 8.6–10.4)
CHLORIDE BLD-SCNC: 103 MMOL/L (ref 98–107)
CO2: 28 MMOL/L (ref 20–31)
CREAT SERPL-MCNC: 0.46 MG/DL (ref 0.5–0.9)
GFR AFRICAN AMERICAN: >60 ML/MIN
GFR NON-AFRICAN AMERICAN: >60 ML/MIN
GFR SERPL CREATININE-BSD FRML MDRD: ABNORMAL ML/MIN/{1.73_M2}
GFR SERPL CREATININE-BSD FRML MDRD: ABNORMAL ML/MIN/{1.73_M2}
GLUCOSE BLD-MCNC: 123 MG/DL (ref 70–99)
POTASSIUM SERPL-SCNC: 3.9 MMOL/L (ref 3.7–5.3)
SODIUM BLD-SCNC: 139 MMOL/L (ref 135–144)

## 2021-09-10 PROCEDURE — 2580000003 HC RX 258: Performed by: ANESTHESIOLOGY

## 2021-09-10 PROCEDURE — 2500000003 HC RX 250 WO HCPCS: Performed by: SPECIALIST

## 2021-09-10 PROCEDURE — C1894 INTRO/SHEATH, NON-LASER: HCPCS | Performed by: ANESTHESIOLOGY

## 2021-09-10 PROCEDURE — 2500000003 HC RX 250 WO HCPCS: Performed by: ANESTHESIOLOGY

## 2021-09-10 PROCEDURE — 88341 IMHCHEM/IMCYTCHM EA ADD ANTB: CPT

## 2021-09-10 PROCEDURE — 88342 IMHCHEM/IMCYTCHM 1ST ANTB: CPT

## 2021-09-10 PROCEDURE — 36415 COLL VENOUS BLD VENIPUNCTURE: CPT

## 2021-09-10 PROCEDURE — 2709999900 HC NON-CHARGEABLE SUPPLY: Performed by: ANESTHESIOLOGY

## 2021-09-10 PROCEDURE — 6370000000 HC RX 637 (ALT 250 FOR IP): Performed by: ANESTHESIOLOGY

## 2021-09-10 PROCEDURE — 7100000011 HC PHASE II RECOVERY - ADDTL 15 MIN: Performed by: ANESTHESIOLOGY

## 2021-09-10 PROCEDURE — 3700000000 HC ANESTHESIA ATTENDED CARE: Performed by: ANESTHESIOLOGY

## 2021-09-10 PROCEDURE — 6360000002 HC RX W HCPCS: Performed by: SPECIALIST

## 2021-09-10 PROCEDURE — 7100000001 HC PACU RECOVERY - ADDTL 15 MIN: Performed by: ANESTHESIOLOGY

## 2021-09-10 PROCEDURE — 3209999900 FLUORO FOR SURGICAL PROCEDURES

## 2021-09-10 PROCEDURE — 80048 BASIC METABOLIC PNL TOTAL CA: CPT

## 2021-09-10 PROCEDURE — 7100000010 HC PHASE II RECOVERY - FIRST 15 MIN: Performed by: ANESTHESIOLOGY

## 2021-09-10 PROCEDURE — 7100000000 HC PACU RECOVERY - FIRST 15 MIN: Performed by: ANESTHESIOLOGY

## 2021-09-10 PROCEDURE — C1713 ANCHOR/SCREW BN/BN,TIS/BN: HCPCS | Performed by: ANESTHESIOLOGY

## 2021-09-10 PROCEDURE — 3600000012 HC SURGERY LEVEL 2 ADDTL 15MIN: Performed by: ANESTHESIOLOGY

## 2021-09-10 PROCEDURE — 88311 DECALCIFY TISSUE: CPT

## 2021-09-10 PROCEDURE — 2720000010 HC SURG SUPPLY STERILE: Performed by: ANESTHESIOLOGY

## 2021-09-10 PROCEDURE — 3700000001 HC ADD 15 MINUTES (ANESTHESIA): Performed by: ANESTHESIOLOGY

## 2021-09-10 PROCEDURE — 88307 TISSUE EXAM BY PATHOLOGIST: CPT

## 2021-09-10 PROCEDURE — 3600000002 HC SURGERY LEVEL 2 BASE: Performed by: ANESTHESIOLOGY

## 2021-09-10 PROCEDURE — 22514 PERQ VERTEBRAL AUGMENTATION: CPT | Performed by: ANESTHESIOLOGY

## 2021-09-10 PROCEDURE — 6360000004 HC RX CONTRAST MEDICATION: Performed by: ANESTHESIOLOGY

## 2021-09-10 DEVICE — CEMENT C01A KYPHX HV-R BONE CEMENT EN
Type: IMPLANTABLE DEVICE | Site: BACK | Status: FUNCTIONAL
Brand: KYPHON® HV-R® BONE CEMENT

## 2021-09-10 DEVICE — BONE CEMENT C01B HV-R WITH MIXER  US
Type: IMPLANTABLE DEVICE | Site: BACK | Status: FUNCTIONAL
Brand: KYPHON® HV-R® BONE CEMENT AND KYPHON® MIXER PACK

## 2021-09-10 RX ORDER — SODIUM CHLORIDE 0.9 % (FLUSH) 0.9 %
5-40 SYRINGE (ML) INJECTION EVERY 12 HOURS SCHEDULED
Status: CANCELLED | OUTPATIENT
Start: 2021-09-10

## 2021-09-10 RX ORDER — FENTANYL CITRATE 50 UG/ML
25 INJECTION, SOLUTION INTRAMUSCULAR; INTRAVENOUS EVERY 5 MIN PRN
Status: DISCONTINUED | OUTPATIENT
Start: 2021-09-10 | End: 2021-09-10 | Stop reason: HOSPADM

## 2021-09-10 RX ORDER — LIDOCAINE HYDROCHLORIDE 10 MG/ML
1 INJECTION, SOLUTION EPIDURAL; INFILTRATION; INTRACAUDAL; PERINEURAL
Status: DISCONTINUED | OUTPATIENT
Start: 2021-09-11 | End: 2021-09-10 | Stop reason: HOSPADM

## 2021-09-10 RX ORDER — NEOSTIGMINE METHYLSULFATE 5 MG/5 ML
SYRINGE (ML) INTRAVENOUS PRN
Status: DISCONTINUED | OUTPATIENT
Start: 2021-09-10 | End: 2021-09-10 | Stop reason: SDUPTHER

## 2021-09-10 RX ORDER — HYDROMORPHONE HYDROCHLORIDE 1 MG/ML
0.5 INJECTION, SOLUTION INTRAMUSCULAR; INTRAVENOUS; SUBCUTANEOUS EVERY 5 MIN PRN
Status: DISCONTINUED | OUTPATIENT
Start: 2021-09-10 | End: 2021-09-10 | Stop reason: HOSPADM

## 2021-09-10 RX ORDER — GLYCOPYRROLATE 1 MG/5 ML
SYRINGE (ML) INTRAVENOUS PRN
Status: DISCONTINUED | OUTPATIENT
Start: 2021-09-10 | End: 2021-09-10 | Stop reason: SDUPTHER

## 2021-09-10 RX ORDER — ROCURONIUM BROMIDE 10 MG/ML
INJECTION, SOLUTION INTRAVENOUS PRN
Status: DISCONTINUED | OUTPATIENT
Start: 2021-09-10 | End: 2021-09-10 | Stop reason: SDUPTHER

## 2021-09-10 RX ORDER — CEFAZOLIN SODIUM 1 G/3ML
INJECTION, POWDER, FOR SOLUTION INTRAMUSCULAR; INTRAVENOUS PRN
Status: DISCONTINUED | OUTPATIENT
Start: 2021-09-10 | End: 2021-09-10 | Stop reason: SDUPTHER

## 2021-09-10 RX ORDER — OXYCODONE HYDROCHLORIDE AND ACETAMINOPHEN 5; 325 MG/1; MG/1
1 TABLET ORAL PRN
Status: DISCONTINUED | OUTPATIENT
Start: 2021-09-10 | End: 2021-09-10 | Stop reason: HOSPADM

## 2021-09-10 RX ORDER — FENTANYL CITRATE 50 UG/ML
INJECTION, SOLUTION INTRAMUSCULAR; INTRAVENOUS PRN
Status: DISCONTINUED | OUTPATIENT
Start: 2021-09-10 | End: 2021-09-10 | Stop reason: SDUPTHER

## 2021-09-10 RX ORDER — SODIUM CHLORIDE 0.9 % (FLUSH) 0.9 %
5-40 SYRINGE (ML) INJECTION PRN
Status: CANCELLED | OUTPATIENT
Start: 2021-09-10

## 2021-09-10 RX ORDER — OXYCODONE HYDROCHLORIDE AND ACETAMINOPHEN 5; 325 MG/1; MG/1
2 TABLET ORAL PRN
Status: DISCONTINUED | OUTPATIENT
Start: 2021-09-10 | End: 2021-09-10 | Stop reason: HOSPADM

## 2021-09-10 RX ORDER — SODIUM CHLORIDE, SODIUM LACTATE, POTASSIUM CHLORIDE, CALCIUM CHLORIDE 600; 310; 30; 20 MG/100ML; MG/100ML; MG/100ML; MG/100ML
INJECTION, SOLUTION INTRAVENOUS CONTINUOUS
Status: DISCONTINUED | OUTPATIENT
Start: 2021-09-11 | End: 2021-09-10 | Stop reason: HOSPADM

## 2021-09-10 RX ORDER — PROPOFOL 10 MG/ML
INJECTION, EMULSION INTRAVENOUS PRN
Status: DISCONTINUED | OUTPATIENT
Start: 2021-09-10 | End: 2021-09-10 | Stop reason: SDUPTHER

## 2021-09-10 RX ORDER — DEXAMETHASONE SODIUM PHOSPHATE 10 MG/ML
INJECTION INTRAMUSCULAR; INTRAVENOUS PRN
Status: DISCONTINUED | OUTPATIENT
Start: 2021-09-10 | End: 2021-09-10 | Stop reason: SDUPTHER

## 2021-09-10 RX ORDER — ONDANSETRON 2 MG/ML
INJECTION INTRAMUSCULAR; INTRAVENOUS PRN
Status: DISCONTINUED | OUTPATIENT
Start: 2021-09-10 | End: 2021-09-10 | Stop reason: SDUPTHER

## 2021-09-10 RX ORDER — ONDANSETRON 2 MG/ML
4 INJECTION INTRAMUSCULAR; INTRAVENOUS
Status: DISCONTINUED | OUTPATIENT
Start: 2021-09-10 | End: 2021-09-10 | Stop reason: HOSPADM

## 2021-09-10 RX ORDER — LIDOCAINE HYDROCHLORIDE 20 MG/ML
INJECTION, SOLUTION EPIDURAL; INFILTRATION; INTRACAUDAL; PERINEURAL PRN
Status: DISCONTINUED | OUTPATIENT
Start: 2021-09-10 | End: 2021-09-10 | Stop reason: SDUPTHER

## 2021-09-10 RX ORDER — BUPIVACAINE HYDROCHLORIDE AND EPINEPHRINE 5; 5 MG/ML; UG/ML
INJECTION, SOLUTION EPIDURAL; INTRACAUDAL; PERINEURAL PRN
Status: DISCONTINUED | OUTPATIENT
Start: 2021-09-10 | End: 2021-09-10 | Stop reason: ALTCHOICE

## 2021-09-10 RX ORDER — IPRATROPIUM BROMIDE AND ALBUTEROL SULFATE 2.5; .5 MG/3ML; MG/3ML
1 SOLUTION RESPIRATORY (INHALATION) ONCE
Status: COMPLETED | OUTPATIENT
Start: 2021-09-10 | End: 2021-09-10

## 2021-09-10 RX ORDER — PROMETHAZINE HYDROCHLORIDE 25 MG/ML
6.25 INJECTION, SOLUTION INTRAMUSCULAR; INTRAVENOUS
Status: DISCONTINUED | OUTPATIENT
Start: 2021-09-10 | End: 2021-09-10 | Stop reason: HOSPADM

## 2021-09-10 RX ORDER — MIDAZOLAM HYDROCHLORIDE 1 MG/ML
INJECTION INTRAMUSCULAR; INTRAVENOUS PRN
Status: DISCONTINUED | OUTPATIENT
Start: 2021-09-10 | End: 2021-09-10 | Stop reason: SDUPTHER

## 2021-09-10 RX ORDER — HYDRALAZINE HYDROCHLORIDE 20 MG/ML
5 INJECTION INTRAMUSCULAR; INTRAVENOUS EVERY 10 MIN PRN
Status: DISCONTINUED | OUTPATIENT
Start: 2021-09-10 | End: 2021-09-10 | Stop reason: HOSPADM

## 2021-09-10 RX ORDER — SODIUM CHLORIDE 9 MG/ML
25 INJECTION, SOLUTION INTRAVENOUS PRN
Status: CANCELLED | OUTPATIENT
Start: 2021-09-10

## 2021-09-10 RX ORDER — LABETALOL HYDROCHLORIDE 5 MG/ML
5 INJECTION, SOLUTION INTRAVENOUS EVERY 10 MIN PRN
Status: DISCONTINUED | OUTPATIENT
Start: 2021-09-10 | End: 2021-09-10 | Stop reason: HOSPADM

## 2021-09-10 RX ADMIN — Medication 3 MG: at 10:54

## 2021-09-10 RX ADMIN — PHENYLEPHRINE HYDROCHLORIDE 200 MCG: 10 INJECTION INTRAVENOUS at 10:44

## 2021-09-10 RX ADMIN — DEXAMETHASONE SODIUM PHOSPHATE 10 MG: 10 INJECTION INTRAMUSCULAR; INTRAVENOUS at 10:16

## 2021-09-10 RX ADMIN — Medication 0.4 MG: at 10:54

## 2021-09-10 RX ADMIN — SODIUM CHLORIDE, POTASSIUM CHLORIDE, SODIUM LACTATE AND CALCIUM CHLORIDE: 600; 310; 30; 20 INJECTION, SOLUTION INTRAVENOUS at 08:32

## 2021-09-10 RX ADMIN — PROPOFOL 100 MG: 10 INJECTION, EMULSION INTRAVENOUS at 10:18

## 2021-09-10 RX ADMIN — ROCURONIUM BROMIDE 40 MG: 10 INJECTION, SOLUTION INTRAVENOUS at 09:55

## 2021-09-10 RX ADMIN — CEFAZOLIN 2000 MG: 1 INJECTION, POWDER, FOR SOLUTION INTRAMUSCULAR; INTRAVENOUS at 10:06

## 2021-09-10 RX ADMIN — PHENYLEPHRINE HYDROCHLORIDE 100 MCG: 10 INJECTION INTRAVENOUS at 10:34

## 2021-09-10 RX ADMIN — MIDAZOLAM 2 MG: 1 INJECTION INTRAMUSCULAR; INTRAVENOUS at 09:50

## 2021-09-10 RX ADMIN — PHENYLEPHRINE HYDROCHLORIDE 200 MCG: 10 INJECTION INTRAVENOUS at 10:49

## 2021-09-10 RX ADMIN — IPRATROPIUM BROMIDE AND ALBUTEROL SULFATE 1 AMPULE: .5; 3 SOLUTION RESPIRATORY (INHALATION) at 08:33

## 2021-09-10 RX ADMIN — PHENYLEPHRINE HYDROCHLORIDE 100 MCG: 10 INJECTION INTRAVENOUS at 10:15

## 2021-09-10 RX ADMIN — PHENYLEPHRINE HYDROCHLORIDE 100 MCG: 10 INJECTION INTRAVENOUS at 10:30

## 2021-09-10 RX ADMIN — LIDOCAINE HYDROCHLORIDE 65 MG: 20 INJECTION, SOLUTION EPIDURAL; INFILTRATION; INTRACAUDAL; PERINEURAL at 09:55

## 2021-09-10 RX ADMIN — PHENYLEPHRINE HYDROCHLORIDE 100 MCG: 10 INJECTION INTRAVENOUS at 10:26

## 2021-09-10 RX ADMIN — ONDANSETRON 4 MG: 2 INJECTION, SOLUTION INTRAMUSCULAR; INTRAVENOUS at 10:54

## 2021-09-10 RX ADMIN — SODIUM CHLORIDE, POTASSIUM CHLORIDE, SODIUM LACTATE AND CALCIUM CHLORIDE: 600; 310; 30; 20 INJECTION, SOLUTION INTRAVENOUS at 10:56

## 2021-09-10 RX ADMIN — Medication 50 MCG: at 09:53

## 2021-09-10 RX ADMIN — PROPOFOL 100 MG: 10 INJECTION, EMULSION INTRAVENOUS at 09:55

## 2021-09-10 ASSESSMENT — LIFESTYLE VARIABLES: SMOKING_STATUS: 1

## 2021-09-10 ASSESSMENT — PULMONARY FUNCTION TESTS
PIF_VALUE: 21
PIF_VALUE: 13
PIF_VALUE: 10
PIF_VALUE: 14
PIF_VALUE: 22
PIF_VALUE: 14
PIF_VALUE: 13
PIF_VALUE: 22
PIF_VALUE: 10
PIF_VALUE: 21
PIF_VALUE: 26
PIF_VALUE: 16
PIF_VALUE: 15
PIF_VALUE: 13
PIF_VALUE: 17
PIF_VALUE: 18
PIF_VALUE: 12
PIF_VALUE: 19
PIF_VALUE: 19
PIF_VALUE: 20
PIF_VALUE: 2
PIF_VALUE: 22
PIF_VALUE: 15
PIF_VALUE: 6
PIF_VALUE: 19
PIF_VALUE: 17
PIF_VALUE: 30
PIF_VALUE: 21
PIF_VALUE: 1
PIF_VALUE: 19
PIF_VALUE: 21
PIF_VALUE: 20
PIF_VALUE: 20
PIF_VALUE: 19
PIF_VALUE: 21
PIF_VALUE: 22
PIF_VALUE: 21
PIF_VALUE: 18
PIF_VALUE: 20
PIF_VALUE: 16
PIF_VALUE: 14
PIF_VALUE: 20
PIF_VALUE: 0
PIF_VALUE: 21
PIF_VALUE: 16
PIF_VALUE: 0
PIF_VALUE: 22
PIF_VALUE: 13
PIF_VALUE: 18
PIF_VALUE: 13
PIF_VALUE: 17
PIF_VALUE: 14
PIF_VALUE: 21
PIF_VALUE: 20
PIF_VALUE: 13
PIF_VALUE: 20
PIF_VALUE: 14
PIF_VALUE: 18
PIF_VALUE: 22
PIF_VALUE: 2
PIF_VALUE: 0
PIF_VALUE: 19
PIF_VALUE: 17
PIF_VALUE: 21
PIF_VALUE: 14
PIF_VALUE: 20
PIF_VALUE: 14
PIF_VALUE: 14
PIF_VALUE: 17
PIF_VALUE: 21
PIF_VALUE: 18
PIF_VALUE: 19
PIF_VALUE: 18
PIF_VALUE: 22
PIF_VALUE: 20
PIF_VALUE: 14
PIF_VALUE: 20
PIF_VALUE: 21
PIF_VALUE: 24
PIF_VALUE: 21

## 2021-09-10 ASSESSMENT — PAIN SCALES - GENERAL
PAINLEVEL_OUTOF10: 6
PAINLEVEL_OUTOF10: 0

## 2021-09-10 ASSESSMENT — PAIN - FUNCTIONAL ASSESSMENT: PAIN_FUNCTIONAL_ASSESSMENT: 0-10

## 2021-09-10 ASSESSMENT — PAIN DESCRIPTION - DESCRIPTORS
DESCRIPTORS: HEAVINESS;ACHING
DESCRIPTORS: ACHING;CONSTANT

## 2021-09-10 ASSESSMENT — PAIN DESCRIPTION - LOCATION: LOCATION: BACK;HEAD

## 2021-09-10 NOTE — TELEPHONE ENCOUNTER
Pt called on Friday afternoon (9/10/21) after her surgical procedure with Dr. Aubree Joseph today. She wants to schedule her surgical follow up and was unsure if doctor wanted to see her in 1 week or 2. I informed the patient I would have office call her to schedule her surgical f/u.

## 2021-09-10 NOTE — ANESTHESIA PRE PROCEDURE
Department of Anesthesiology  Preprocedure Note       Name:  Osmel Willoughby   Age:  45 y.o.  :  1970                                          MRN:  [id number]         Date:  9/10/2021      Surgeon: Val Garcia):  Alfredo Vidal MD    Procedure: Procedure(s):  KYPHOPLASTY lumbar L5    Medications prior to admission:   Prior to Admission medications    Medication Sig Start Date End Date Taking? Authorizing Provider   topiramate (TOPAMAX) 50 MG tablet Take 1 tablet by mouth 2 times daily 9/3/21  Yes Ludivina Justice MD   spironolactone (ALDACTONE) 50 MG tablet Take 1 tablet by mouth daily 9/3/21  Yes Ludivina Justice MD   hydrOXYzine (ATARAX) 25 MG tablet Take 1 tablet by mouth 3 times daily as needed for Anxiety 21  Yes LOI Montoya NP   metoclopramide (REGLAN) 5 MG tablet Take 1 tablet by mouth 3 times daily 21  Yes Raj Almanza MD   omeprazole (PRILOSEC) 40 MG delayed release capsule Take 1 capsule by mouth 2 times daily 21  Yes Raj Almanza MD   lipase-protease-amylase (CREON) 19351-51001 units delayed release capsule Take 1 capsule by mouth 3 times daily (with meals) 21  Yes Ludivina Justice MD   busPIRone (BUSPAR) 30 MG tablet Take 30 mg by mouth 2 times daily (with meals) 21  Yes OLI Montoya NP   traZODone (DESYREL) 50 MG tablet Take 1 tablet by mouth nightly as needed for Sleep 8/5/21 9/10/21 Yes LOI Montoya NP   escitalopram (LEXAPRO) 5 MG tablet Take 1 tablet by mouth daily 21  Yes LOI Montoya NP   acamprosate (CAMPRAL) 333 MG tablet Take 2 tablets by mouth 3 times daily 8/5/21 9/10/21 Yes LOI Montoya NP   pregabalin (LYRICA) 200 MG capsule Take 1 capsule by mouth 3 times daily for 90 days.  21 Yes Willy Adorno MD   KLOR-CON M20 20 MEQ extended release tablet TAKE ONE TABLET BY MOUTH DAILY 21  Yes Ludivina Justice MD   amLODIPine (NORVASC) 5 MG tablet TAKE ONE TABLET BY MOUTH DAILY 6/3/21  Yes Ludivina Morales MD   baclofen (LIORESAL) 10 MG tablet Take 1 tablet by mouth 3 times daily 5/25/21  Yes Ludivina Morales MD   ferrous sulfate (IRON 325) 325 (65 Fe) MG tablet Take 1 tablet by mouth 2 times daily 4/22/21  Yes Ludivina Morales MD   rOPINIRole (REQUIP) 1 MG tablet Take 1 tablet by mouth nightly 4/1/21  Yes Ludivina Morales MD   budesonide-formoterol [facility]) 160-4.5 MCG/ACT AERO Inhale 2 puffs into the lungs 2 times daily 3/31/21  Yes Ludivina Morales MD   tiotropium (SPIRIVA RESPIMAT) 2.5 MCG/ACT AERS inhaler Inhale 2 puffs into the lungs daily 2/26/21 9/10/21 Yes Jorge De MD   vitamin B-1 (THIAMINE) 100 MG tablet Take 1 tablet by mouth daily 7/12/19  Yes Ludivina Morales MD   folic acid (FOLVITE) 1 MG tablet Take 1 tablet by mouth daily 6/19/19  Yes Ludivina Morales MD   carBAMazepine (TEGRETOL) 200 MG tablet Take 1 tablet by mouth 3 times daily 9/3/21   Ludivina Morales MD   tiZANidine (ZANAFLEX) 4 MG tablet  7/19/21   Historical Provider, MD   simethicone (MYLICON) 80 MG chewable tablet Take 1 tablet by mouth 4 times daily as needed for Flatulence 8/27/21   Rubén Ha MD   nicotine (NICODERM CQ) 21 MG/24HR Place 1 patch onto the skin daily  Patient not taking: Reported on 9/7/2021 8/4/21   LOI Bender - NP   sodium chloride (ALTAMIST SPRAY) 0.65 % nasal spray 1 spray by Nasal route as needed for Congestion  Patient not taking: Reported on 9/7/2021 12/28/20   Ludivina Morales MD   albuterol sulfate HFA (VENTOLIN HFA) 108 (90 Base) MCG/ACT inhaler Inhale 2 puffs into the lungs 4 times daily as needed for Wheezing 6/11/20   Ludivina Morales MD   vitamin D (ERGOCALCIFEROL) 48613 units CAPS capsule Take 1 capsule by mouth once a week 6/19/19   Ludivina Morales MD       Current medications:    Current Facility-Administered Medications   Medication Dose Route Frequency Provider Last Rate Last Admin    [START ON 9/11/2021] lactated ringers infusion   IntraVENous Continuous Jacinto Vo MD 50 mL/hr at 09/10/21 0832 New Bag at 09/10/21 0832    [START ON 9/11/2021] lidocaine PF 1 % injection 1 mL  1 mL IntraDERmal Once PRN Jacinto Vo MD           Allergies:  No Known Allergies    Problem List:    Patient Active Problem List   Diagnosis Code    Acute bronchitis J20.9    Reflex sympathetic dystrophy of lower limb G90.529    Essential hypertension I10    Nicotine addiction F17.200    Psychophysiological insomnia F51.04    Anxiety G72.2    Alcoholic peripheral neuropathy (HCC) G62.1    Hypokalemia E87.6    Macrocytic anemia D53.9    Hypomagnesemia E83.42    Tobacco use Z72.0    Ambulatory dysfunction R26.2    Seizure disorder ([address] G40.909    COPD with acute exacerbation (HCC) J44.1    Paresthesia of lower extremity R20.2    Acute respiratory failure with hypoxia (HCC) J96.01    Pancreatic cyst K86.2    Recurrent major depressive disorder (HCC) F33.9    Elevated CA 19-9 level R97.8    Perineal mass, female N90.89    Esophagitis candidal  B37.81    Condyloma A63.0    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24, the patient underwent 2 surgical resections without known recurrence C71.9    Alcohol use disorder, severe, in early remission ([address] L59.69    Metabolic encephalopathy T83.06    AMAN (generalized anxiety disorder) F41.1    Major depressive disorder, recurrent severe without psychotic features ([address] F33.2    Esophageal dysphagia R13.10    Chronic peripheral neuropathic pain M79.2, G89.29    History of alcohol abuse F10.11    History of petit-mal seizures Z86.69    Intractable episodic tension-type headache G44. [address] history of astrocytoma Z85.841    Multilevel spine pain M54.9    Chronic pain syndrome G89.4    Marijuana abuse F12.10    Severe sepsis (HCC) A41.9, R65.20    Closed fracture of fifth thoracic vertebra (HCC) S22.059A    Diverticulitis of large intestine with abscess without bleeding K57.20    Elevated brain natriuretic peptide (BNP) level R79.89    Elevated alkaline phosphatase level R74.8    Chronic pancreatitis ([facility]) K86.1    Erythema ab igne L59.0    Compression fracture of thoracic vertebra ([facility]) S22.000A    Compression of lumbar vertebra ([facility]) I88.021Z    Metabolic acidosis with increased anion gap and accumulation of organic acids E87.2    Community acquired pneumonia of left lower lobe of lung J18.9    Septicemia ([facility]) A41.9    Alcohol-induced acute pancreatitis K85.20    Fluid collection of pancreas K86.89    Diverticulitis of large intestine without perforation or abscess with bleeding K57.33    Pseudocyst of pancreas K86.3    Bandemia D72.825    Sepsis ([facility]) A41.9    Acute recurrent pancreatitis K85.90    Atelectasis of left lung J98.11       Past Medical History:        Diagnosis Date    Acute respiratory failure with hypoxia ([facility]) 10/16/2020    Alcohol withdrawal syndrome, with delirium ([address] 12/14/2019    Alcoholism ([address]     Anemia 10/2020    GI bleed    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24, the patient underwent 2 surgical resections without known recurrence 10/23/2020    Closed fracture of lateral portion of left tibial plateau 14/19/7681    COPD (chronic obstructive pulmonary disease) ([facility])     CO2 retainer, on Bipap at night for this, Dr. Judith Reeves ( last visit 11/20/2020 and note on chart )    Depression     bipolar, major depressive disorder, ptsd, anxiety    Dysphagia     GI bleed 10/2020    Hypertension     Memory loss     Oxygen dependent     pt stated not needed as of 12/9/2020    Pain, joint, ankle and foot     Pancreatic lesion 10/2020    Dr. Yi Adams working up pt    Peripheral neuropathy     Seizures ([address]     also baseline tremors-last sz summer 2020    Tension headache     Under care of team 07/01/2021    neuro-Dr Wan-st munoz-last visit june 2021    Under care of team 07/01/2021    pain management-Hamzah jimenez-last visit june 2021    Under care of team 07/01/2021    pulmonology-Dr [facility]-last virtual visit feb 2021    Under care of team 07/01/2021    psych-bahnfeldt NP-telemed-last visit may 2021    Under care of team 07/01/2021    [address]-last visit june 2021    Wellness examination 07/01/2021    pcp-Ludivina grimes-last visit may 2021       Past Surgical History:        Procedure Laterality Date    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    COLONOSCOPY N/A 10/22/2020    COLONOSCOPY DIAGNOSTIC performed by Ila Lopez MD at [address]  7/28/2021    CT ABSCESS DRAIN SUBCUTANEOUS 7/28/2021 [facility] CT SCAN    ENDOSCOPIC ULTRASOUND (LOWER) N/A 12/9/2020    ENDOSCOPIC ULTRASOUND, UPPER WITH LINEAR SCOPE FOR BIOPSY OF MASS ON HEAD OF PANCREAS performed by Aleyda Enrique MD at [facility] ERCP  08/11/2021    ERCP N/A 8/11/2021    ERCP STENT INSERTION performed by Dasia Sarmiento MD at [facility]    ERCP  8/11/2021    ERCP SPHINCTER/PAPILLOTOMY performed by Dasia Sarmiento MD at [address] Left 7-3-13    ORIF tibial plateau    FRACTURE SURGERY Right     small finger metacarpal fracture    HAND SURGERY      pins    HYSTERECTOMY  2003    UPPER GASTROINTESTINAL ENDOSCOPY N/A 10/22/2020    EGD BIOPSY performed by Ila Lopez MD at [address] [place] [address] N/A 4/5/2021    EGD BIOPSY performed by Ila Lopez MD at [address] [place] [address] N/A 9/8/2021    ENDOSCOPIC ULTRASOUND, LINEAR SCOPE performed [facility]da En[facility] MD at [facility]       Social History:    Social History     Tobacco Use    Smoking status: Current Every Day Smoker     Packs/day: 0.50     Years: 30.00     Pack years: 15.00     Types: Cigarettes    Smokeless tobacco: Never Used    Tobacco comment: plans on quitting in the future    Substance Use Topics    Alcohol use: Not Currently     Alcohol/week: 0.0 standard drinks                                Ready to quit: Not Answered  Counseling given: Not Answered  Comment: plans on quitting in the future       Vital Signs (Current):   Vitals:    09/10/21 0754 09/10/21 0815   SpO2: (!) 87% 99%   Weight: 125 lb 6.4 oz (56.9 kg)    Height: 5' 5\" (1.651 m)                                               BP Readings from Last 3 Encounters:   09/08/21 119/74   09/08/21 135/85   09/07/21 110/80       NPO Status: Time of last liquid consumption: 2345                        Time of last solid consumption: 2100                        Date of last liquid consumption: 09/09/21                        Date of last solid food consumption: 09/09/21    BMI:   Wt Readings from Last 3 Encounters:   09/10/21 125 lb 6.4 oz (56.9 kg)   09/08/21 125 lb (56.7 kg)   09/07/21 123 lb (55.8 kg)     Body mass index is 20.87 kg/m². CBC:   Lab Results   Component Value Date    WBC 8.8 08/12/2021    RBC 2.88 08/12/2021    RBC 4.16 04/30/2012    HGB 8.5 08/12/2021    HCT 27.2 08/12/2021    MCV 94.4 08/12/2021    RDW 14.9 08/12/2021     08/12/2021     04/30/2012       CMP:   Lab Results   Component Value Date     09/10/2021    K 3.9 09/10/2021     09/10/2021    CO2 28 09/10/2021    BUN 4 09/10/2021    CREATININE 0.46 09/10/2021    GFRAA >60 09/10/2021    LABGLOM >60 09/10/2021    GLUCOSE 123 09/10/2021    GLUCOSE 92 04/30/2012    PROT 7.0 08/09/2021    CALCIUM 9.4 09/10/2021    BILITOT 0.22 08/09/2021    ALKPHOS 250 08/09/2021    AST 19 08/09/2021    ALT 10 08/09/2021       POC Tests: No results for input(s): POCGLU, POCNA, POCK, POCCL, POCBUN, POCHEMO, POCHCT in the last 72 hours.     Coags:   Lab Results   Component Value Date    PROTIME 10.9 08/09/2021    INR 1.0 08/09/2021    APTT 24.6 08/09/2021       HCG (If Applicable): No results found for: PREGTESTUR, PREGSERUM, HCG, HCGQUANT     ABGs: No results found for: PHART, PO2ART, LFB2GCA, WSN5FSJ, BEART, I7FDMDIB     Type & Screen (If Applicable):  No results found for: LABABO, LABRH    Drug/Infectious Status (If Applicable):  Lab Results   Component Value Date    HEPCAB NONREACTIVE 07/14/2019       COVID-19 Screening (If Applicable):   Lab Results   Component Value Date    COVID19 Not Detected 07/22/2021    COVID19 Not Detected 05/20/2021    COVID19 Not Detected 12/05/2020           Anesthesia Evaluation  Patient summary reviewed and Nursing notes reviewed no history of anesthetic complications:   Airway: Mallampati: II  TM distance: >3 FB   Neck ROM: full  Mouth opening: > = 3 FB Dental: normal exam         Pulmonary:   (+) COPD:  decreased breath sounds,  wheezes,  current smoker                          ROS comment: Home O2  PE comment: Chronic slight wheeze  Cardiovascular:  Exercise tolerance: no interval change,   (+) hypertension:,     (-) CAD and CABG/stent    ECG reviewed    Rate: normal                    Neuro/Psych:   (+) headaches:, psychiatric history:            GI/Hepatic/Renal:        (-) GERD       Endo/Other:                     Abdominal:             Vascular: Other Findings:             Anesthesia Plan      general     ASA 3       Induction: intravenous. MIPS: Postoperative opioids intended and Prophylactic antiemetics administered. Anesthetic plan and risks discussed with patient. Plan discussed with CRNA.     Attending anesthesiologist reviewed and agrees with Preprocedure content      O2 sats at baseline 88-89 BAYRON Saldana DO   9/10/2021

## 2021-09-10 NOTE — H&P
Interval H&P Note    Pt Name: Ernesto Brambila  MRN: [id number]  YOB: 1970  Date of evaluation: 9/10/2021      [x] I have reviewed in Epic the Pain Management Progress Note by Dr. Merry Royal dated 8/31/2021 attached below which meets the criteria for an Interval History and Physical note. [x] I have examined  Barbara White  There are no changes to the patient who is scheduled for a kyphoplasty L5 by Dr. Merry Royal for pathologic compression fracture L5 and intractable low back pain. The patient denies new health changes, fever, chills, wheezing, cough, increased SOB, chest pain, open sores or wounds. Denies hx diabetes. Last Vitamin D 9/9/2021. Patient recently hospitalized on 8/9/2021 and discharged on 8/12/2021 for acute pancreatitis and pancreatic fluid collection. Patient had drain placed 7/28/2021 prior to last hospitalization and it was \"unplugged\" to improve pancreatic pain. Patient had elevated WBC and was put on antibiotics during admission. Patient had ERCP and pancreatic stent placed on 8/11/2021. Patient was discharged on continuous home oxygen at 3L nasal cannula. Patient had follow up outpatient ERCP with stent removal on 9/8/2021. Patient arrived to pre-operative area with oxygen saturation 87% on room air. Patient placed on 3L nasal cannula and anesthesia Dr. Stacy Sue ordered Duoneb treatment. Recheck SpO2 99% on 3L NC. Vital signs: /80   Pulse 81   Resp 20   Ht 5' 5\" (1.651 m)   Wt 125 lb 6.4 oz (56.9 kg)   SpO2 99%   BMI 20.87 kg/m²     Allergies:  Patient has no known allergies. Medications:    Prior to Admission medications    Medication Sig Start Date End Date Taking?  Authorizing Provider   topiramate (TOPAMAX) 50 MG tablet Take 1 tablet by mouth 2 times daily 9/3/21  Yes Ludivina White MD   spironolactone (ALDACTONE) 50 MG tablet Take 1 tablet by mouth daily 9/3/21  Yes Ludivina White MD   hydrOXYzine (ATARAX) 25 MG tablet Take 1 tablet by mouth 3 times daily as needed for Anxiety 8/25/21  Yes Virgie Seth APRN - NP   metoclopramide (REGLAN) 5 MG tablet Take 1 tablet by mouth 3 times daily 8/19/21  Yes Soto Ríos MD   omeprazole (PRILOSEC) 40 MG delayed release capsule Take 1 capsule by mouth 2 times daily 8/19/21  Yes Soto Ríos MD   lipase-protease-amylase (CREON) 35203-04638 units delayed release capsule Take 1 capsule by mouth 3 times daily (with meals) 8/17/21  Yes Ludivina Mcginnis MD   busPIRone (BUSPAR) 30 MG tablet Take 30 mg by mouth 2 times daily (with meals) 8/5/21  Yes Virgie Seth APRN - NP   traZODone (DESYREL) 50 MG tablet Take 1 tablet by mouth nightly as needed for Sleep 8/5/21 9/10/21 Yes Virgie Halim APRN - NP   escitalopram (LEXAPRO) 5 MG tablet Take 1 tablet by mouth daily 8/5/21  Yes Virgie Seth APRN - JW   acamprosate (CAMPRAL) 333 MG tablet Take 2 tablets by mouth 3 times daily 8/5/21 9/10/21 Yes Virgie Seth APRN - NP   pregabalin (LYRICA) 200 MG capsule Take 1 capsule by mouth 3 times daily for 90 days.  7/2/21 9/30/21 Yes MD LAURA WetzelOR-CON M20 20 MEQ extended release tablet TAKE ONE TABLET BY MOUTH DAILY 6/24/21  Yes Ludivina Mcginnis MD   amLODIPine (NORVASC) 5 MG tablet TAKE ONE TABLET BY MOUTH DAILY 6/3/21  Yes Ludivina Mcginnis MD   baclofen (LIORESAL) 10 MG tablet Take 1 tablet by mouth 3 times daily 5/25/21  Yes Ludivina Mcginnis MD   ferrous sulfate (IRON 325) 325 (65 Fe) MG tablet Take 1 tablet by mouth 2 times daily 4/22/21  Yes Ludivina Mcginnis MD   rOPINIRole (REQUIP) 1 MG tablet Take 1 tablet by mouth nightly 4/1/21  Yes Ludivina Mcginnis MD   budesonide-formoterol [facility]) 160-4.5 MCG/ACT AERO Inhale 2 puffs into the lungs 2 times daily 3/31/21  Yes Ludivina Mcginnis MD   tiotropium (SPIRIVA RESPIMAT) 2.5 MCG/ACT AERS inhaler Inhale 2 puffs into the lungs daily 2/26/21 9/10/21 Yes Adelaida Pinedo MD   vitamin B-1 (THIAMINE) 100 MG tablet Take 1 tablet by mouth daily 7/12/19  Yes Charles Cabrera MD   folic acid (FOLVITE) 1 MG tablet Take 1 tablet by mouth daily 6/19/19  Yes Ludivina Bryan MD   carBAMazepine (TEGRETOL) 200 MG tablet Take 1 tablet by mouth 3 times daily 9/3/21   Ludivina Bryan MD   tiZANidine (ZANAFLEX) 4 MG tablet  7/19/21   Historical Provider, MD   simethicone (MYLICON) 80 MG chewable tablet Take 1 tablet by mouth 4 times daily as needed for Flatulence 8/27/21   Marek Cassidy MD   nicotine (NICODERM CQ) 21 MG/24HR Place 1 patch onto the skin daily  Patient not taking: Reported on 9/7/2021 8/4/21   Crow Paris APRN - NP   sodium chloride (ALTAMIST SPRAY) 0.65 % nasal spray 1 spray by Nasal route as needed for Congestion  Patient not taking: Reported on 9/7/2021 12/28/20   Ludivina Bryan MD   albuterol sulfate HFA (VENTOLIN HFA) 108 (90 Base) MCG/ACT inhaler Inhale 2 puffs into the lungs 4 times daily as needed for Wheezing 6/11/20   Ludivina Bryan MD   vitamin D (ERGOCALCIFEROL) 10107 units CAPS capsule Take 1 capsule by mouth once a week 6/19/19   Charles Cabrera MD         This is a 45 y.o. female who is pleasant, cooperative, alert and oriented x3, in no acute distress. Heart: Heart sounds are normal.  HR 81 regular rate and rhythm without murmur, gallop or rub. Lungs: Normal respiratory effort with equal expansion, good air exchange, unlabored and clear diminished to auscultation without wheezes or rales bilaterally   Abdomen: soft, nontender, nondistended with bowel sounds active. Labs:  Recent Labs     09/10/21  0821 08/12/21  0637 08/12/21  0637   HGB  --   --  8.5*   HCT  --   --  27.2*   WBC  --   --  8.8   MCV  --   --  94.4   PLT  --   --  672*      < > 145*   K 3.9   < > 3.3*      < > 106   CO2 28   < > 23   BUN 4*   < > 3*   CREATININE 0.46*   < > 0.51   GLUCOSE 123*   < > 78    < > = values in this interval not displayed. No results for input(s): COVID19 in the last 720 hours.     Samantha Brooks, APRN - chart )    Depression       bipolar, major depressive disorder, ptsd, anxiety    Dysphagia      GI bleed 10/2020    Hypertension      Memory loss      Oxygen dependent       pt stated not needed as of 12/9/2020    Pain, joint, ankle and foot      Pancreatic lesion 10/2020     Dr. Kayli Cheung working up pt    Peripheral neuropathy      Seizures ([facility] [address]       also baseline tremors-last sz summer 2020    Tension headache      Under care of team 07/01/2021     neuro-Dr Wan-[facility]-last visit june 2021    Under care of team 07/01/2021     pain management-Hamzah Martines-nery jimenez-last visit june 2021    Under care of team 07/01/2021     pulmonology-Dr Dueñas-[facility]-last virtual visit feb 2021    Under care of team 07/01/2021     psych-bahnfeldt NP-telemed-last visit may 2021    Under care of team 07/01/2021     [address]-last visit june 2021    Wellness examination 07/01/2021     pcp-Ludivina grimes-last visit may 2021         Past Surgical History         Past Surgical History:   Procedure Laterality Date    BRAIN TUMOR EXCISION   1989     astrocytoma times 2    COLONOSCOPY N/A 10/22/2020     COLONOSCOPY DIAGNOSTIC performed by Chela Beltran MD at [address]   7/28/2021     CT ABSCESS DRAIN SUBCUTANEOUS 7/28/2021 [facility] CT SCAN    ENDOSCOPIC ULTRASOUND (LOWER) N/A 12/9/2020     ENDOSCOPIC ULTRASOUND, UPPER WITH LINEAR SCOPE FOR BIOPSY OF MASS ON HEAD OF PANCREAS performed by Aimee Villela MD at [address] [facility] ERCP   08/11/2021    ERCP N/A 8/11/2021     ERCP STENT INSERTION performed by Summer [facility]in MD at [facility]    ERCP   8/11/2021     ERCP SPHINCTER/PAPILLOTOMY performed by Summer Valentin MD at [address] [facility] Left 7-3-13     ORIF tibial plateau    FRACTURE SURGERY Right       small finger metacarpal fracture    HAND SURGERY         pins    HYSTERECTOMY   2003    UPPER GASTROINTESTINAL ENDOSCOPY N/A 10/22/2020     EGD BIOPSY performed by Caridad Watson MD at [address] [place] [address] N/A 4/5/2021     EGD BIOPSY performed by Caridad Watson MD at [facility]         Social History   Social History            Socioeconomic History    Marital status: Single       Spouse name: None    Number of children: None    Years of education: None    Highest education level: None   Occupational History    None   Tobacco Use    Smoking status: Current Every Day Smoker       Packs/day: 0.50       Years: 30.00       Pack years: 15.00       Types: Cigarettes    Smokeless tobacco: Never Used    Tobacco comment: plans on quitting in the future    Vaping Use    Vaping Use: Never used   Substance and Sexual Activity    Alcohol use: Not Currently       Alcohol/week: 0.0 standard drinks    Drug use: Yes       Frequency: 2.0 times per week       Types: Marijuana    Sexual activity: Not Currently   Other Topics Concern    None   Social History Narrative    None      Social Determinants of Health          Financial Resource Strain: Medium Risk    Difficulty of Paying Living Expenses: Somewhat hard   Food Insecurity: No Food Insecurity    Worried About Running Out of Food in the Last Year: Never true    Tyler of Food in the Last Year: Never true   Transportation Needs:     Lack of Transportation (Medical):      Lack of Transportation (Non-Medical):    Physical Activity:     Days of Exercise per Week:     Minutes of Exercise per Session:    Stress:     Feeling of Stress :    Social Connections:     Frequency of Communication with Friends and Family:     Frequency of Social Gatherings with Friends and Family:     Attends Yazidism Services:     Active Member of Clubs or Organizations:     Attends Club or Organization Meetings:     Marital Status:    Intimate Partner Violence:     Fear of Current or Ex-Partner:     Emotionally Abused:     Physically Abused:     Sexually Abused:          Family History         Family History   Problem Relation Age of Onset    Other Father      Cancer Maternal Grandmother      Heart Disease Paternal Grandmother      Esophageal Cancer Maternal Aunt           No Known Allergies  Patient has no known allergies. Vitals:     08/31/21 1347   BP: 133/83   Pulse: 84   SpO2: 97%      Current Facility-Administered Medications          Current Outpatient Medications   Medication Sig Dispense Refill    tiZANidine (ZANAFLEX) 4 MG tablet          simethicone (MYLICON) 80 MG chewable tablet Take 1 tablet by mouth 4 times daily as needed for Flatulence 180 tablet 3    hydrOXYzine (ATARAX) 25 MG tablet Take 1 tablet by mouth 3 times daily as needed for Anxiety 90 tablet 3    metoclopramide (REGLAN) 5 MG tablet Take 1 tablet by mouth 3 times daily 120 tablet 2    omeprazole (PRILOSEC) 40 MG delayed release capsule Take 1 capsule by mouth 2 times daily 60 capsule 2    lipase-protease-amylase (CREON) 33831-32908 units delayed release capsule Take 1 capsule by mouth 3 times daily (with meals) 90 capsule 1    busPIRone (BUSPAR) 30 MG tablet Take 30 mg by mouth 2 times daily (with meals) 60 tablet 0    traZODone (DESYREL) 50 MG tablet Take 1 tablet by mouth nightly as needed for Sleep 30 tablet 0    escitalopram (LEXAPRO) 5 MG tablet Take 1 tablet by mouth daily 30 tablet 1    acamprosate (CAMPRAL) 333 MG tablet Take 2 tablets by mouth 3 times daily 180 tablet 0    carBAMazepine (TEGRETOL) 200 MG tablet Take 1 tablet by mouth 3 times daily 90 tablet 0    topiramate (TOPAMAX) 50 MG tablet Take 1 tablet by mouth 2 times daily 60 tablet 0    spironolactone (ALDACTONE) 50 MG tablet Take 1 tablet by mouth daily 30 tablet 0    nicotine (NICODERM CQ) 21 MG/24HR Place 1 patch onto the skin daily 30 patch 0    pregabalin (LYRICA) 200 MG capsule Take 1 capsule by mouth 3 times daily for 90 days.  90 capsule 2    KLOR-CON M20 20 MEQ extended release tablet TAKE ONE TABLET BY MOUTH DAILY 30 tablet 2    amLODIPine (NORVASC) 5 MG tablet TAKE ONE TABLET BY MOUTH DAILY 90 tablet 0    baclofen (LIORESAL) 10 MG tablet Take 1 tablet by mouth 3 times daily 60 tablet 1    ferrous sulfate (IRON 325) 325 (65 Fe) MG tablet Take 1 tablet by mouth 2 times daily 180 tablet 1    rOPINIRole (REQUIP) 1 MG tablet Take 1 tablet by mouth nightly 90 tablet 1    budesonide-formoterol (SYMBICORT) 160-4.5 MCG/ACT AERO Inhale 2 puffs into the lungs 2 times daily 3 Inhaler 1    sodium chloride (ALTAMIST SPRAY) 0.65 % nasal spray 1 spray by Nasal route as needed for Congestion 1 Bottle 3    albuterol sulfate HFA (VENTOLIN HFA) 108 (90 Base) MCG/ACT inhaler Inhale 2 puffs into the lungs 4 times daily as needed for Wheezing 1 Inhaler 5    vitamin B-1 (THIAMINE) 100 MG tablet Take 1 tablet by mouth daily 30 tablet 3    vitamin D (ERGOCALCIFEROL) 77386 units CAPS capsule Take 1 capsule by mouth once a week 12 capsule 1    folic acid (FOLVITE) 1 MG tablet Take 1 tablet by mouth daily 90 tablet 1    tiotropium (SPIRIVA RESPIMAT) 2.5 MCG/ACT AERS inhaler Inhale 2 puffs into the lungs daily 1 Inhaler 11      No current facility-administered medications for this visit.         Review of Systems   Constitutional: Positive for fatigue. Respiratory: Negative. Musculoskeletal: Positive for arthralgias and back pain. Neurological: Positive for weakness and numbness. Tingling        Objective:  General Appearance:  Uncomfortable and in pain. Vital signs: (most recent): Blood pressure 133/83, pulse 84, height 5' 5\" (1.651 m), weight 124 lb (56.2 kg), SpO2 97 %. Assessment & Plan         EXAMINATION: MRI OF THE LUMBAR SPINE WITHOUT CONTRAST, 7/23/2021 8:12 am    Impression 1. Acute compression deformity of the superior endplate of L5 with approximately 35% loss of height.  No significant bulging of the posterior cortex. 2. No significant spinal canal stenosis of the lumbar spine. 3. Neural foraminal narrowing at L3-L4 through L5-S1. 4. Minimal retrolisthesis at L5-S1. L3-L4: There is a disc bulge contacting the ventral thecal sac.  No significant spinal canal stenosis.  Minimal left neural foraminal narrowing. No significant right neural foraminal narrowing.  L4-L5: There is a disc bulge contacting the ventral thecal sac.  No significant spinal canal stenosis.  Facet arthrosis contributes to mild bilateral neural foraminal narrowing.  L5-S1: There is a disc bulge with bilateral facet arthrosis contributing to mild bilateral neural foraminal narrowing.  No significant spinal canal stenosis      Pain History  Chronic pain involving multiple side along the spine extending from neck all the way to the lumbar area  Reports intermittent radiation of lower back pain down both legs  Report intermittent numbness in both feet  Neck pain is nonradicular and located in the cervical spine area  Described the pain as constant aching throbbing sharp sensation  Pain aggravated with routine activity  Nothing seems to alleviate the pain  No changes in bladder or bowel control  No previous spine surgical history  No previous spine injection history  No previous physical therapy for spine  Diagnostic work-up include plain films and CT cervical spine that did not show any significant pathology          1. Chronic bilateral low back pain with bilateral sciatica    2. Closed compression fracture of L5 lumbar vertebra, sequela    3. Intractable low back pain    4.  Pathologic compression fracture of lumbar vertebra, sequela            Orders Placed This Encounter   Procedures    Case Request    DEXA VERTEBRAL FRACTURE ASSESSMENT    Ambulatory referral to Physical Therapy        Encounter Medications    No orders of the defined types were placed in this encounter.         MRI lumbar spine, MRI thoracic spine  Report reviewed  Images reviewed independently  Old healed multiple compression fracture in thoracic spine  New compression fracture with the significant height loss at L5 vertebrae   Continue to have pain 6 weeks after MRI at this time        Setting multiple previous fractures in the spine identified I suspect severe osteoporosis  We will obtain DEXA bone scan  Ongoing height loss with severe back pain refractory to conservative measure, I will recommend for kyphoplasty at L5     Follow-up in 2 weeks after kyphoplasty and bone scan     Electronically signed by Lalitha Shelby MD on 8/31/2021 at 2:25 PM               Revision History

## 2021-09-10 NOTE — ANESTHESIA POSTPROCEDURE EVALUATION
Department of Anesthesiology  Postprocedure Note    Patient: Maria Fernanda Avila  MRN: [id number]  YOB: 1970  Date of evaluation: 9/10/2021  Time:  12:52 PM     Procedure Summary     Date: 09/10/21 Room / Location: [address]    Anesthesia Start: 2588 Anesthesia Stop: 8660    Procedure: KYPHOPLASTY lumbar L5 (N/A Back) Diagnosis: (PATHOLOGIC COMPRESSION FRACTURE L5, INTRACTABLE LOW BACK PAIN ,)    Surgeons: Clarissa Villatoro MD Responsible Provider: Mikel Boudreaux DO    Anesthesia Type: general ASA Status: 3          Anesthesia Type: general    Marcell Phase I: Marcell Score: 10    Marcell Phase II: Marcell Score: 9    Last vitals: Reviewed and per EMR flowsheets.        Anesthesia Post Evaluation    Patient location during evaluation: PACU  Patient participation: complete - patient participated  Level of consciousness: awake and alert  Airway patency: patent  Nausea & Vomiting: no nausea and no vomiting  Complications: no  Cardiovascular status: hemodynamically stable  Respiratory status: acceptable  Hydration status: stable

## 2021-09-10 NOTE — OP NOTE
Operative Note      Patient: Paz Hurst  YOB: 1970  MRN: [id number]    Date of Procedure: 9/10/2021    Pre-Op Diagnosis: PATHOLOGIC COMPRESSION FRACTURE L5, INTRACTABLE LOW BACK PAIN ,    Post-Op Diagnosis: Same       Procedure(s):  KYPHOPLASTY lumbar L5    Surgeon(s):  Bonnie Perea MD    Assistant:   * No surgical staff found *    Anesthesia: General    Estimated Blood Loss (mL): Minimal    Complications: None    Specimens:   ID Type Source Tests Collected by Time Destination   A : L5 BONE BIOPSY Bone Spine SURGICAL PATHOLOGY Bonnie Perea MD 9/10/2021 1030        Implants:  Implant Name Type Inv. Item Serial No.  Lot No. LRB No. Used Action   KIT CEMENT BONE W/KYPHON MIXER TUBE FUNNL Cement KIT CEMENT BONE W/KYPHON MIXER TUBE [employer]PMM [id number] N/A 1 Implanted   CEMENT BNE HI VISC RADIOPAQUE KYPHON HV-R  CEMENT BNE HI VISC RADIOPAQUE [facility] HV-R  [employer] [place] [employer]-WD [id number] N/A 1 Implanted         Drains:   [REMOVED] Closed/Suction Drain Left;Lateral Abdomen Bulb 8 Greenlandic (Removed)   Site Description Healing 08/12/21 1445   Dressing Status Clean;Dry; Intact; Old drainage; Changed 08/12/21 1445   Drainage Appearance Brown 08/12/21 1445   Status To bulb suction 08/12/21 1445   Output (ml) 10 ml 08/12/21 0747       Findings: n/a    Detailed Description of Procedure:   PREOPERATIVE DIAGNOSES:  1 L5 pathological compression fracture. 2.  Intractable back pain.     POSTOPERATIVE DIAGNOSES:  1. L5 pathological compression fracture. 2.  Intractable back pain.     PROCEDURE PERFORMED:  Balloon kyphoplasty at L5 lumbar vertebra .     ANESTHESIA:  General, anesthesia provided by anesthesiologist.     BLOOD LOSS:  Minimal.     COMPLICATION:  None.     Antibiotics: 2 GM ANCEF    OPERATIVE NOTE:  Patient was seen in the operative area. Chart was reviewed and informed  consent was obtained.   Risks and benefits were discussed with the patient  and IV access was established filler, total injection volume was 8.5 mL The needles were then removed. There was  no leakage, no drainage in any direction of the vertebral body. Cement was  spread across the midline. Site of the needle placement was then closed  with suture and covered with a sterile dressing.     Patient was then turned supine and was extubated by the anesthesiologist.  Patient was assisted in the recovery area.         Electronically signed by Margorie Crigler, MD on 9/10/2021 at 11:12 AM

## 2021-09-14 LAB — SURGICAL PATHOLOGY REPORT: NORMAL

## 2021-09-15 ENCOUNTER — TELEPHONE (OUTPATIENT)
Dept: PAIN MANAGEMENT | Age: 51
End: 2021-09-15

## 2021-09-15 NOTE — TELEPHONE ENCOUNTER
Pt states she was doing fine after procedure but now she is experiencing stiffness and is more sore. Pt feels like her legs are weaker but she denies falling down or feeling like she will fall down.  Pt scheduled for appointment tomorrow 9/16/21

## 2021-09-16 ENCOUNTER — TELEPHONE (OUTPATIENT)
Dept: PAIN MANAGEMENT | Age: 51
End: 2021-09-16

## 2021-09-16 ENCOUNTER — OFFICE VISIT (OUTPATIENT)
Dept: PAIN MANAGEMENT | Age: 51
End: 2021-09-16
Payer: MEDICARE

## 2021-09-16 VITALS
BODY MASS INDEX: 20.83 KG/M2 | DIASTOLIC BLOOD PRESSURE: 94 MMHG | SYSTOLIC BLOOD PRESSURE: 135 MMHG | WEIGHT: 125 LBS | OXYGEN SATURATION: 97 % | HEIGHT: 65 IN | HEART RATE: 85 BPM

## 2021-09-16 DIAGNOSIS — M47.816 LUMBAR FACET ARTHROPATHY: ICD-10-CM

## 2021-09-16 DIAGNOSIS — M54.50 CHRONIC BILATERAL LOW BACK PAIN WITHOUT SCIATICA: Primary | ICD-10-CM

## 2021-09-16 DIAGNOSIS — G89.29 CHRONIC BILATERAL LOW BACK PAIN WITHOUT SCIATICA: Primary | ICD-10-CM

## 2021-09-16 PROCEDURE — G8420 CALC BMI NORM PARAMETERS: HCPCS | Performed by: ANESTHESIOLOGY

## 2021-09-16 PROCEDURE — 4004F PT TOBACCO SCREEN RCVD TLK: CPT | Performed by: ANESTHESIOLOGY

## 2021-09-16 PROCEDURE — G8427 DOCREV CUR MEDS BY ELIG CLIN: HCPCS | Performed by: ANESTHESIOLOGY

## 2021-09-16 PROCEDURE — 3017F COLORECTAL CA SCREEN DOC REV: CPT | Performed by: ANESTHESIOLOGY

## 2021-09-16 PROCEDURE — 99214 OFFICE O/P EST MOD 30 MIN: CPT | Performed by: ANESTHESIOLOGY

## 2021-09-16 RX ORDER — DULOXETIN HYDROCHLORIDE 60 MG/1
60 CAPSULE, DELAYED RELEASE ORAL DAILY
COMMUNITY
Start: 2021-09-07 | End: 2021-12-13

## 2021-09-16 ASSESSMENT — ENCOUNTER SYMPTOMS: BACK PAIN: 1

## 2021-09-16 NOTE — TELEPHONE ENCOUNTER
Pt declined scheduling lumbar MBNB at this time.  Pt states she would like to further discuss this with Dr. Trae Painting during her appointment on 9/28/21

## 2021-09-16 NOTE — PROGRESS NOTES
The patient is a 45 y. o. Non- / non  female.     Chief Complaint   Patient presents with    Post-Op Check     s/p Kyphoplasty    Back Pain        HPI    Back pain  Chronic onset many years ago located in the lumbar area across midline affect both side aggravates with routine activity interfere with quality of life  Symptoms of progressively been worsening  No radiation in leg  No associated numbness or paresthesia  Describes it as aching nagging stiffness in the lower back area  Rates intensity between 5-7 over 10  Tried conservative measures in past including therapy home disc exercise lifestyle modification NSAIDs  Had recent MRI lumbar spine that showed lumbar facet arthropathy  No previous lumbar spine injection history  No previous lumbar spine surgical history    Past Medical History:   Diagnosis Date    Acute respiratory failure with hypoxia ([address] 10/16/2020    Alcohol withdrawal syndrome, with delirium ([address] 12/14/2019    Alcoholism ([address]     Anemia 10/2020    GI bleed    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24, the patient underwent 2 surgical resections without known recurrence 10/23/2020    Closed fracture of lateral portion of left tibial plateau 22/21/0894    COPD (chronic obstructive pulmonary disease) ([address]     CO2 retainer, on Bipap at night for this, Dr. Estrella Dahl ( last visit 11/20/2020 and note on chart )    Depression     bipolar, major depressive disorder, ptsd, anxiety    Dysphagia     GI bleed 10/2020    Hypertension     Memory loss     Oxygen dependent     pt stated not needed as of 12/9/2020    Pain, joint, ankle and foot     Pancreatic lesion 10/2020    Dr. Steve Rider working up pt    Peripheral neuropathy     Seizures ([address]     also baseline tremors-last sz summer 2020    Tension headache     Under care of team 07/01/2021    neuro-Dr Xi munoz-last visit june 2021    Under care of team 07/01/2021    pain management-Hamzah jimenez-last visit june 2021    Under care of team 07/01/2021    pulmonology-Dr [facility]-last virtual visit feb 2021    Under care of team 07/01/2021    psych-bahnfeldt NP-telemed-last visit may 2021    Under care of team 07/01/2021    co-[address]-last visit june 2021    Wellness examination 07/01/2021    pcp-Ludiv[address]-last visit may 2021        Past Surgical History:   Procedure Laterality Date    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    COLONOSCOPY N/A 10/22/2020    COLONOSCOPY DIAGNOSTIC performed by Shelby Mahmood MD at [address]  7/28/2021    CT ABSCESS DRAIN SUBCUTANEOUS 7/28/2021 [facility] CT SCAN    ENDOSCOPIC ULTRASOUND (LOWER) N/A 12/9/2020    ENDOSCOPIC ULTRASOUND, UPPER WITH LINEAR SCOPE FOR BIOPSY OF MASS ON HEAD OF PANCREAS performed by Jaime More MD at [address] ERCP  08/11/2021    ERCP N/A 8/11/2021    ERCP STENT INSERTION performed by Watler Hightower MD at [facility]    ERCP  8/11/2021    ERCP SPHINCTER/PAPILLOTOMY performed by Walter Hightower MD at [address] [facility] Left 7-3-13    ORIF tibial plateau    FRACTURE SURGERY Right     small finger metacarpal fracture    HAND SURGERY      pins    HYSTERECTOMY  2003    SPINE SURGERY N/A 9/10/2021    KYPHOPLASTY lumbar L5 performed by Eran Arciniega MD at [address] [facility] [address] 10/22/2020    EGD BIOPSY performed by Shelby Mahmood MD at [address] [place] [address] 4/5/2021    EGD BIOPSY performed by Shelby Mahmood MD at [address] [place] [address] N/A 9/8/2021    ENDOSCOPIC ULTRASOUND, LINEAR SCOPE performed by Jaime More MD at [facility] [facility] Marital status: Single     Spouse name: None    Number of children: None    Years of education: None    Highest education level: None   Occupational History    None   Tobacco Use    Smoking status: Current Every Day Smoker     Packs/day: 0.50     Years: 30.00     Pack years: 15.00     Types: Cigarettes    Smokeless tobacco: Never Used    Tobacco comment: plans on quitting in the future    Vaping Use    Vaping Use: Never used   Substance and Sexual Activity    Alcohol use: Not Currently     Alcohol/week: 0.0 standard drinks    Drug use: Yes     Frequency: 2.0 times per week     Types: Marijuana     Comment: last time 09/01/21    Sexual activity: Not Currently   Other Topics Concern    None   Social History Narrative    None     Social Determinants of Health     Financial Resource Strain: Medium Risk    Difficulty of Paying Living Expenses: Somewhat hard   Food Insecurity: No Food Insecurity    Worried About Running Out of Food in the Last Year: Never true    [place] of Food in the Last Year: Never true   Transportation Needs:     Lack of Transportation (Medical):      Lack of Transportation (Non-Medical):    Physical Activity:     Days of Exercise per Week:     Minutes of Exercise per Session:    Stress:     Feeling of Stress :    Social Connections:     Frequency of Communication with Friends and Family:     Frequency of Social Gatherings with Friends and Family:     Attends [employer] Services:     Active Member of Clubs or Organizations:     Attends Club or Organization Meetings:     Marital Status:    Intimate Partner Violence:     Fear of Current or Ex-Partner:     Emotionally Abused:     Physically Abused:     Sexually Abused:        Family History   Problem Relation Age of Onset    Other Father     Cancer Maternal Grandmother     Heart Disease Paternal Grandmother     Esophageal Cancer Maternal Aunt        No Known Allergies    Vitals:    09/16/21 1117   BP: (!) 135/94   Pulse: 85   SpO2: 97%       Current Outpatient Medications   Medication Sig Dispense Refill    DULoxetine (CYMBALTA) 60 MG extended release capsule       topiramate (TOPAMAX) 50 MG tablet Take 1 tablet by mouth 2 times daily 60 tablet 0    spironolactone (ALDACTONE) 50 MG tablet Take 1 tablet by mouth daily 30 tablet 0    carBAMazepine (TEGRETOL) 200 MG tablet Take 1 tablet by mouth 3 times daily 90 tablet 0    simethicone (MYLICON) 80 MG chewable tablet Take 1 tablet by mouth 4 times daily as needed for Flatulence 180 tablet 3    hydrOXYzine (ATARAX) 25 MG tablet Take 1 tablet by mouth 3 times daily as needed for Anxiety 90 tablet 3    metoclopramide (REGLAN) 5 MG tablet Take 1 tablet by mouth 3 times daily 120 tablet 2    omeprazole (PRILOSEC) 40 MG delayed release capsule Take 1 capsule by mouth 2 times daily 60 capsule 2    lipase-protease-amylase (CREON) 50389-37943 units delayed release capsule Take 1 capsule by mouth 3 times daily (with meals) 90 capsule 1    busPIRone (BUSPAR) 30 MG tablet Take 30 mg by mouth 2 times daily (with meals) 60 tablet 0    traZODone (DESYREL) 50 MG tablet Take 1 tablet by mouth nightly as needed for Sleep 30 tablet 0    escitalopram (LEXAPRO) 5 MG tablet Take 1 tablet by mouth daily 30 tablet 1    acamprosate (CAMPRAL) 333 MG tablet Take 2 tablets by mouth 3 times daily 180 tablet 0    pregabalin (LYRICA) 200 MG capsule Take 1 capsule by mouth 3 times daily for 90 days.  90 capsule 2    KLOR-CON M20 20 MEQ extended release tablet TAKE ONE TABLET BY MOUTH DAILY 30 tablet 2    amLODIPine (NORVASC) 5 MG tablet TAKE ONE TABLET BY MOUTH DAILY 90 tablet 0    baclofen (LIORESAL) 10 MG tablet Take 1 tablet by mouth 3 times daily 60 tablet 1    ferrous sulfate (IRON 325) 325 (65 Fe) MG tablet Take 1 tablet by mouth 2 times daily 180 tablet 1    rOPINIRole (REQUIP) 1 MG tablet Take 1 tablet by mouth nightly 90 tablet 1    budesonide-formoterol (SYMBICORT) 160-4.5 MCG/ACT AERO Inhale 2 puffs into the lungs 2 times daily 3 Inhaler 1    tiotropium (SPIRIVA RESPIMAT) 2.5 MCG/ACT AERS inhaler Inhale 2 puffs into the lungs daily 1 Inhaler 11    albuterol tenderness to palpation over bilateral lower lumbar paraspinal muscle facet joint  Facet loading maneuver positive  Gait stable    Assessment & Plan     Sharp pain in back improved after kyphoplasty, no sign or symptom of any systemic or local infection    Today main issue is chronic axial lower back pain located in the lumbosacral area across midline affect both side onset many years ago progressively worsened over time refractory to conservative measures including therapy lifestyle modification NSAIDs and muscle relaxant  Pain interfere with quality of life  Clinical examination suggest facet mediated pain  Imaging it showed multilevel lumbar facet arthropathy  I would recommend for diagnostic medial branch nerve block  If that provide short-term relief then consider for radiofrequency ablation  1. Chronic bilateral low back pain without sciatica    2. Lumbar facet arthropathy        Orders Placed This Encounter   Procedures    FACET JOINT L/S SINGLE      No orders of the defined types were placed in this encounter.            Electronically signed by Clarissa Villatoro MD on 9/16/2021 at 1:06 PM

## 2021-09-22 ENCOUNTER — TELEPHONE (OUTPATIENT)
Dept: GASTROENTEROLOGY | Age: 51
End: 2021-09-22

## 2021-09-22 DIAGNOSIS — K86.1 CHRONIC PANCREATITIS, UNSPECIFIED PANCREATITIS TYPE (HCC): Primary | ICD-10-CM

## 2021-09-22 NOTE — TELEPHONE ENCOUNTER
Writer calling patient to discuss scheduling procedure with Dr Surekha Rosales for EUS/ERCP with sten removal. Left detail message to call us back.

## 2021-09-22 NOTE — TELEPHONE ENCOUNTER
Writer called patient to schedule procedure with Dr Nelly Garvey. No answer left message. Patient needs EUS/ERCP with Stent removal . Dr Nelly Garvey possible avail days 10/7-10/21-11/4-11/18 please verify with [facility] before scheduling.

## 2021-09-22 NOTE — TELEPHONE ENCOUNTER
Writer called patient to schedule procedure with Dr Alonzo Rosas. No answer left message. Patient needs EUS/ERCP with Stent removal . Dr Alonzo Rosas possible avail days 10/7-10/21-11/4-11/18 please verify with [facility] before scheduling.

## 2021-09-22 NOTE — TELEPHONE ENCOUNTER
----- Message from Rhina Marcos MD sent at 9/22/2021 12:35 PM EDT -----  Regarding: RE: Lets schedule an EUS/ERCP with Stent removal with Dr. Arlette Obrien in the next 1-2 weeks  Effie Funk, lets have this patient scheduled for EUS/ERCP with Dr. Arlette Obrien on his next available. Ill place the orders  ----- Message -----  From: Sidney James MD  Sent: 9/21/2021   2:56 PM EDT  To: Rhina Marcos MD  Subject: RE: Lets schedule an EUS/ERCP with Stent rem#    I agree. Can you guys arrange it?   ----- Message -----  From: Rhina Marcos MD  Sent: 9/21/2021   1:15 PM EDT  To: Sidney James MD, Yulissa Mckeon  Subject: Lets schedule an EUS/ERCP with Stent removal#    Dr. Arlette Obrien,    She may benefit from ESWL at [facility].

## 2021-09-23 NOTE — TELEPHONE ENCOUNTER
Pt called in and stated that she was advised to schedule an EFWL with Dr. Wing Llamas @ [facility]. Pt states she was adv that a referral needs to be sent over before they will schedule her.

## 2021-09-23 NOTE — TELEPHONE ENCOUNTER
Writer spoke with patient, patient scheduled with Dr Josephine Jara at [email or web address] for EUS/ERCP with Stent removal. Patient given verbal instructions over the phone. Patient give verbal understanding.

## 2021-09-27 RX ORDER — SODIUM CHLORIDE, SODIUM LACTATE, POTASSIUM CHLORIDE, CALCIUM CHLORIDE 600; 310; 30; 20 MG/100ML; MG/100ML; MG/100ML; MG/100ML
1000 INJECTION, SOLUTION INTRAVENOUS CONTINUOUS
Status: CANCELLED | OUTPATIENT
Start: 2021-09-27

## 2021-09-29 ENCOUNTER — TELEPHONE (OUTPATIENT)
Dept: GASTROENTEROLOGY | Age: 51
End: 2021-09-29

## 2021-09-29 ENCOUNTER — HOSPITAL ENCOUNTER (OUTPATIENT)
Dept: PREADMISSION TESTING | Age: 51
Discharge: HOME | End: 2021-10-03
Payer: MEDICARE

## 2021-09-29 ENCOUNTER — TELEPHONE (OUTPATIENT)
Dept: FAMILY MEDICINE CLINIC | Age: 51
End: 2021-09-29

## 2021-09-29 VITALS
DIASTOLIC BLOOD PRESSURE: 96 MMHG | SYSTOLIC BLOOD PRESSURE: 148 MMHG | HEIGHT: 65 IN | WEIGHT: 134 LBS | RESPIRATION RATE: 18 BRPM | OXYGEN SATURATION: 96 % | BODY MASS INDEX: 22.33 KG/M2 | HEART RATE: 80 BPM | TEMPERATURE: 97.2 F

## 2021-09-29 DIAGNOSIS — E87.1 HYPONATREMIA: Primary | ICD-10-CM

## 2021-09-29 LAB
ANION GAP SERPL CALCULATED.3IONS-SCNC: 11 MMOL/L (ref 9–17)
BUN BLDV-MCNC: 5 MG/DL (ref 6–20)
CHLORIDE BLD-SCNC: 89 MMOL/L (ref 98–107)
CO2: 24 MMOL/L (ref 20–31)
CREAT SERPL-MCNC: 0.45 MG/DL (ref 0.5–0.9)
GFR AFRICAN AMERICAN: >60 ML/MIN
GFR NON-AFRICAN AMERICAN: >60 ML/MIN
GFR SERPL CREATININE-BSD FRML MDRD: ABNORMAL ML/MIN/{1.73_M2}
GFR SERPL CREATININE-BSD FRML MDRD: ABNORMAL ML/MIN/{1.73_M2}
GLUCOSE BLD-MCNC: 98 MG/DL (ref 70–99)
HCT VFR BLD CALC: 39.7 % (ref 36.3–47.1)
HEMOGLOBIN: 12.6 G/DL (ref 11.9–15.1)
POTASSIUM SERPL-SCNC: 4.2 MMOL/L (ref 3.7–5.3)
SODIUM BLD-SCNC: 124 MMOL/L (ref 135–144)

## 2021-09-29 PROCEDURE — 36415 COLL VENOUS BLD VENIPUNCTURE: CPT

## 2021-09-29 PROCEDURE — 84520 ASSAY OF UREA NITROGEN: CPT

## 2021-09-29 PROCEDURE — 85018 HEMOGLOBIN: CPT

## 2021-09-29 PROCEDURE — 82947 ASSAY GLUCOSE BLOOD QUANT: CPT

## 2021-09-29 PROCEDURE — 93005 ELECTROCARDIOGRAM TRACING: CPT | Performed by: ANESTHESIOLOGY

## 2021-09-29 PROCEDURE — 82565 ASSAY OF CREATININE: CPT

## 2021-09-29 PROCEDURE — 85014 HEMATOCRIT: CPT

## 2021-09-29 PROCEDURE — 80051 ELECTROLYTE PANEL: CPT

## 2021-09-29 RX ORDER — SODIUM CHLORIDE 1000 MG
1 TABLET, SOLUBLE MISCELLANEOUS 3 TIMES DAILY
Qty: 90 TABLET | Refills: 3 | Status: SHIPPED | OUTPATIENT
Start: 2021-09-29 | End: 2021-10-04

## 2021-09-29 NOTE — TELEPHONE ENCOUNTER
Pt called wondering when stents will be removed and said there were questions about her CT scan I informed her I did not jodie any questions and results and further questions can be answered at her follow up appointment.

## 2021-09-29 NOTE — PROGRESS NOTES
Frances Li called back and stated that [facility] wants a medical clearance after reviewing abnormal labs and EKG.

## 2021-09-29 NOTE — TELEPHONE ENCOUNTER
Patient states she had testing done today for upcoming surgery on 10/6. Patient states her sodium is low and was told to call her pcp and see if she needs to be on a sodium pill.

## 2021-09-30 LAB
EKG ATRIAL RATE: 72 BPM
EKG P AXIS: 74 DEGREES
EKG P-R INTERVAL: 234 MS
EKG Q-T INTERVAL: 418 MS
EKG QRS DURATION: 90 MS
EKG QTC CALCULATION (BAZETT): 457 MS
EKG R AXIS: 88 DEGREES
EKG T AXIS: 74 DEGREES
EKG VENTRICULAR RATE: 72 BPM

## 2021-09-30 PROCEDURE — 93010 ELECTROCARDIOGRAM REPORT: CPT | Performed by: INTERNAL MEDICINE

## 2021-10-03 ENCOUNTER — HOSPITAL ENCOUNTER (OUTPATIENT)
Age: 51
Discharge: HOME | End: 2021-10-03
Payer: MEDICARE

## 2021-10-03 DIAGNOSIS — E87.1 HYPONATREMIA: ICD-10-CM

## 2021-10-03 LAB
ANION GAP SERPL CALCULATED.3IONS-SCNC: 12 MMOL/L (ref 9–17)
BUN BLDV-MCNC: 8 MG/DL (ref 6–20)
BUN/CREAT BLD: ABNORMAL (ref 9–20)
CALCIUM SERPL-MCNC: 9.2 MG/DL (ref 8.6–10.4)
CHLORIDE BLD-SCNC: 95 MMOL/L (ref 98–107)
CO2: 24 MMOL/L (ref 20–31)
CREAT SERPL-MCNC: 0.45 MG/DL (ref 0.5–0.9)
GFR AFRICAN AMERICAN: >60 ML/MIN
GFR NON-AFRICAN AMERICAN: >60 ML/MIN
GFR SERPL CREATININE-BSD FRML MDRD: ABNORMAL ML/MIN/{1.73_M2}
GFR SERPL CREATININE-BSD FRML MDRD: ABNORMAL ML/MIN/{1.73_M2}
GLUCOSE BLD-MCNC: 77 MG/DL (ref 70–99)
POTASSIUM SERPL-SCNC: 5.4 MMOL/L (ref 3.7–5.3)
SODIUM BLD-SCNC: 131 MMOL/L (ref 135–144)

## 2021-10-03 PROCEDURE — 80048 BASIC METABOLIC PNL TOTAL CA: CPT

## 2021-10-03 PROCEDURE — 36415 COLL VENOUS BLD VENIPUNCTURE: CPT

## 2021-10-04 ENCOUNTER — HOSPITAL ENCOUNTER (OUTPATIENT)
Dept: PSYCHIATRY | Age: 51
Setting detail: THERAPIES SERIES
Discharge: HOME | End: 2021-10-04
Payer: MEDICARE

## 2021-10-04 ENCOUNTER — OFFICE VISIT (OUTPATIENT)
Dept: FAMILY MEDICINE CLINIC | Age: 51
End: 2021-10-04
Payer: MEDICARE

## 2021-10-04 VITALS
HEIGHT: 64 IN | HEART RATE: 82 BPM | TEMPERATURE: 98 F | OXYGEN SATURATION: 99 % | BODY MASS INDEX: 22.94 KG/M2 | DIASTOLIC BLOOD PRESSURE: 84 MMHG | SYSTOLIC BLOOD PRESSURE: 136 MMHG | WEIGHT: 134.4 LBS

## 2021-10-04 DIAGNOSIS — Z01.818 PREOP EXAM FOR INTERNAL MEDICINE: Primary | ICD-10-CM

## 2021-10-04 DIAGNOSIS — Z23 FLU VACCINE NEED: ICD-10-CM

## 2021-10-04 DIAGNOSIS — A63.0 ANAL CONDYLOMA: ICD-10-CM

## 2021-10-04 DIAGNOSIS — E87.1 HYPONATREMIA: ICD-10-CM

## 2021-10-04 DIAGNOSIS — Z23 NEED FOR SHINGLES VACCINE: ICD-10-CM

## 2021-10-04 DIAGNOSIS — K86.0 ALCOHOL-INDUCED CHRONIC PANCREATITIS (HCC): ICD-10-CM

## 2021-10-04 PROCEDURE — 3017F COLORECTAL CA SCREEN DOC REV: CPT | Performed by: INTERNAL MEDICINE

## 2021-10-04 PROCEDURE — G8482 FLU IMMUNIZE ORDER/ADMIN: HCPCS | Performed by: INTERNAL MEDICINE

## 2021-10-04 PROCEDURE — 4004F PT TOBACCO SCREEN RCVD TLK: CPT | Performed by: INTERNAL MEDICINE

## 2021-10-04 PROCEDURE — G8427 DOCREV CUR MEDS BY ELIG CLIN: HCPCS | Performed by: INTERNAL MEDICINE

## 2021-10-04 PROCEDURE — 90837 PSYTX W PT 60 MINUTES: CPT | Performed by: SOCIAL WORKER

## 2021-10-04 PROCEDURE — G8420 CALC BMI NORM PARAMETERS: HCPCS | Performed by: INTERNAL MEDICINE

## 2021-10-04 PROCEDURE — 90471 IMMUNIZATION ADMIN: CPT | Performed by: INTERNAL MEDICINE

## 2021-10-04 PROCEDURE — 99214 OFFICE O/P EST MOD 30 MIN: CPT | Performed by: INTERNAL MEDICINE

## 2021-10-04 PROCEDURE — 90674 CCIIV4 VAC NO PRSV 0.5 ML IM: CPT | Performed by: INTERNAL MEDICINE

## 2021-10-04 RX ORDER — TRAMADOL HYDROCHLORIDE 50 MG/1
TABLET ORAL PRN
COMMUNITY
Start: 2021-09-30 | End: 2021-11-01

## 2021-10-04 RX ORDER — ZOSTER VACCINE RECOMBINANT, ADJUVANTED 50 MCG/0.5
0.5 KIT INTRAMUSCULAR SEE ADMIN INSTRUCTIONS
Qty: 0.5 ML | Refills: 0 | Status: SHIPPED | OUTPATIENT
Start: 2021-10-04 | End: 2021-11-01

## 2021-10-04 RX ORDER — CHLORHEXIDINE GLUCONATE 0.12 MG/ML
RINSE ORAL
COMMUNITY
Start: 2021-09-30 | End: 2021-11-01 | Stop reason: ALTCHOICE

## 2021-10-04 RX ORDER — AMOXICILLIN 500 MG/1
CAPSULE ORAL
COMMUNITY
Start: 2021-09-30 | End: 2021-10-19

## 2021-10-04 RX ORDER — SODIUM CHLORIDE 1000 MG
1 TABLET, SOLUBLE MISCELLANEOUS 4 TIMES DAILY
Qty: 120 TABLET | Refills: 3
Start: 2021-10-04 | End: 2021-10-25 | Stop reason: DRUGHIGH

## 2021-10-04 NOTE — PROGRESS NOTES
Preoperative Consultation      Gayle Laws  YOB: 1970    Date of Service:  10/4/2021    Vitals:    10/04/21 1207   BP: 136/84   Pulse: 82   Temp: 98 °F (36.7 °C)   TempSrc: Temporal   SpO2: 99%   Weight: 134 lb 6.4 oz (61 kg)   Height: 5' 3.6\" (1.615 m)      Wt Readings from Last 2 Encounters:   10/04/21 134 lb 6.4 oz (61 kg)   09/29/21 134 lb (60.8 kg)     BP Readings from Last 3 Encounters:   10/04/21 136/84   09/29/21 (!) 148/96   09/16/21 (!) 135/94        Chief Complaint   Patient presents with    Surgical Consult     No Known Allergies  Outpatient Medications Marked as Taking for the 10/4/21 encounter (Office Visit) with Jess Chakraborty MD   Medication Sig Dispense Refill    chlorhexidine (PERIDEX) 0.12 % solution       sodium chloride 1 g tablet Take 1 tablet by mouth 3 times daily 90 tablet 3    DULoxetine (CYMBALTA) 60 MG extended release capsule Take 60 mg by mouth daily       topiramate (TOPAMAX) 50 MG tablet Take 1 tablet by mouth 2 times daily 60 tablet 0    spironolactone (ALDACTONE) 50 MG tablet Take 1 tablet by mouth daily 30 tablet 0    carBAMazepine (TEGRETOL) 200 MG tablet Take 1 tablet by mouth 3 times daily 90 tablet 0    simethicone (MYLICON) 80 MG chewable tablet Take 1 tablet by mouth 4 times daily as needed for Flatulence 180 tablet 3    hydrOXYzine (ATARAX) 25 MG tablet Take 1 tablet by mouth 3 times daily as needed for Anxiety 90 tablet 3    metoclopramide (REGLAN) 5 MG tablet Take 1 tablet by mouth 3 times daily 120 tablet 2    omeprazole (PRILOSEC) 40 MG delayed release capsule Take 1 capsule by mouth 2 times daily 60 capsule 2    lipase-protease-amylase (CREON) 08067-49700 units delayed release capsule Take 1 capsule by mouth 3 times daily (with meals) 90 capsule 1    busPIRone (BUSPAR) 30 MG tablet Take 30 mg by mouth 2 times daily (with meals) 60 tablet 0    traZODone (DESYREL) 50 MG tablet Take 1 tablet by mouth nightly as needed for Sleep 30 tablet 0    escitalopram (LEXAPRO) 5 MG tablet Take 1 tablet by mouth daily 30 tablet 1    acamprosate (CAMPRAL) 333 MG tablet Take 2 tablets by mouth 3 times daily 180 tablet 0    KLOR-CON M20 20 MEQ extended release tablet TAKE ONE TABLET BY MOUTH DAILY 30 tablet 2    amLODIPine (NORVASC) 5 MG tablet TAKE ONE TABLET BY MOUTH DAILY 90 tablet 0    baclofen (LIORESAL) 10 MG tablet Take 1 tablet by mouth 3 times daily 60 tablet 1    ferrous sulfate (IRON 325) 325 (65 Fe) MG tablet Take 1 tablet by mouth 2 times daily 180 tablet 1    rOPINIRole (REQUIP) 1 MG tablet Take 1 tablet by mouth nightly 90 tablet 1    budesonide-formoterol (SYMBICORT) 160-4.5 MCG/ACT AERO Inhale 2 puffs into the lungs 2 times daily 3 Inhaler 1    tiotropium (SPIRIVA RESPIMAT) 2.5 MCG/ACT AERS inhaler Inhale 2 puffs into the lungs daily 1 Inhaler 11    albuterol sulfate HFA (VENTOLIN HFA) 108 (90 Base) MCG/ACT inhaler Inhale 2 puffs into the lungs 4 times daily as needed for Wheezing 1 Inhaler 5    vitamin B-1 (THIAMINE) 100 MG tablet Take 1 tablet by mouth daily 30 tablet 3    vitamin D (ERGOCALCIFEROL) 25670 units CAPS capsule Take 1 capsule by mouth once a week 12 capsule 1    folic acid (FOLVITE) 1 MG tablet Take 1 tablet by mouth daily 90 tablet 1       This patient presents to the office today for a preoperative consultation at the request of surgeon, Dr. Chidi James, who [place] [facility] [place] problem began several years ago, and symptoms have been worsening with time. Conservative therapy: N/A.     Planned anesthesia: General   Known anesthesia problems: None   Bleeding risk: No recent or remote history of abnormal bleeding  Personal or FH of DVT/PE: No    Patient objection to receiving blood products: No    Patient Active Problem List   Diagnosis    Acute bronchitis    Reflex sympathetic dystrophy of lower limb    Essential hypertension    Nicotine addiction left lung       Past Medical History:   Diagnosis Date    Acute respiratory failure with hypoxia ([facility] 10/16/2020    Alcohol withdrawal syndrome, with delirium ([facility] 12/14/2019    Alcoholism ([facility]     Anemia 10/2020    GI bleed    Anxiety     Astrocytoma ([facility] - 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24, the patient underwent 2 surgical resections without known recurrence 10/23/2020    Closed fracture of lateral portion of left tibial plateau 20/51/7819    COPD (chronic obstructive pulmonary disease) ([facility]     CO2 retainer, on Bipap at night for this, Dr. Rocio Dalton ( last visit 11/20/2020 and note on chart )    Depression     bipolar, major depressive disorder, ptsd, anxiety    Dysphagia     GI bleed 10/2020    Hypertension     Memory loss     Oxygen dependent     Pain, joint, ankle and foot     Pancreatic lesion 10/2020    Dr. Luis Eduardo Jacobo working up pt    Peripheral neuropathy     Seizures ([facility]     also baseline tremors-last sz summer 2020    Tension headache     Under care of team 09/29/2021    neuro-Dr Coyle-[facility]-last visit sep 2021    Under care of team 09/29/2021    pain management-Hamzah Martines[facility] [place]-last visit sep 2021    Under care of team 09/29/2021    pulmonology-Dr Due[facility]as-[facility]-due for visit oct 26/2021    Under care of team 09/29/2021    psych-bahnfeldt NP-telemed-last visit may 2021    Under care of team 09/29/2021    iv-[facility]-last visit aug 2021    Wellness examination 09/29/2021    pcp-Ludivina Grimm-[facility] cornelio-last visit june 2021     Past Surgical History:   Procedure Laterality Date    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    COLONOSCOPY N/A 10/22/2020    COLONOSCOPY DIAGNOSTIC performed by Ever Arriaga MD at [facility]    \[facility] [place] [facility]\"" 1827  7/28/2021    CT ABSCESS DRAIN SUBCUTANEOUS 7/28/2021 [facility] CT SCAN    ENDOSCOPIC ULTRASOUND (LOWER) N/A 12/9/2020    ENDOSCOPIC ULTRASOUND, UPPER WITH LINEAR SCOPE FOR BIOPSY OF MASS ON file     Social Determinants of Health     Financial Resource Strain: Medium Risk    Difficulty of Paying Living Expenses: Somewhat hard   Food Insecurity: No Food Insecurity    Worried About Running Out of Food in the Last Year: Never true    [place] of Food in the Last Year: Never true   Transportation Needs:     Lack of Transportation (Medical):  Lack of Transportation (Non-Medical):    Physical Activity:     Days of Exercise per Week:     Minutes of Exercise per Session:    Stress:     Feeling of Stress :    Social Connections:     Frequency of Communication with Friends and Family:     Frequency of Social Gatherings with Friends and Family:     Attends Yarsanism Services:     Active Member of Clubs or Organizations:     Attends Club or Organization Meetings:     Marital Status:    Intimate Partner Violence:     Fear of Current or Ex-Partner:     Emotionally Abused:     Physically Abused:     Sexually Abused:        Review of Systems  A comprehensive review of systems was negative except for what was noted in the HPI. Physical Exam   Constitutional: She is oriented to person, place, and time. She appears well-developed and well-nourished. No distress. HENT:   Head: Normocephalic and atraumatic. Mouth/Throat: Uvula is midline, oropharynx is clear and moist and mucous membranes are normal.   Eyes: Conjunctivae and EOM are normal. Pupils are equal, round, and reactive to light. Neck: Trachea normal and normal range of motion. Neck supple. No JVD present. Carotid bruit is not present. No mass and no thyromegaly present. Cardiovascular: Normal rate, regular rhythm, normal heart sounds and intact distal pulses. Exam reveals no gallop and no friction rub. No murmur heard. Pulmonary/Chest: Effort normal and breath sounds normal. No respiratory distress. She has no wheezes. She has no rales. Abdominal: Soft.  Normal aorta and bowel sounds are normal. She exhibits no distension and no mass. There is no hepatosplenomegaly. No tenderness. Musculoskeletal: She exhibits no edema and no tenderness. Neurological: She is alert and oriented to person, place, and time. She has normal strength. No cranial nerve deficit or sensory deficit. Coordination and gait normal.   Skin: Skin is warm and dry. No rash noted. No erythema. Psychiatric: She has a normal mood and affect. Her behavior is normal.     EKG Interpretation:  normal sinus rhythm, nonspecific ST and T waves changes, unchanged from previous tracings.     Lab Review   Hospital Outpatient Visit on 10/03/2021   Component Date Value    Glucose 10/03/2021 77     BUN 10/03/2021 8     CREATININE 10/03/2021 0.45*    Bun/Cre Ratio 10/03/2021 NOT REPORTED     Calcium 10/03/2021 9.2     Sodium 10/03/2021 131*    Potassium 10/03/2021 5.4*    Chloride 10/03/2021 95*    CO2 10/03/2021 24     Anion Gap 10/03/2021 12     GFR Non- 10/03/2021 >60     GFR  10/03/2021 >60     GFR Comment 10/03/2021          GFR Staging 10/03/2021  [facility] Outpatient Visit on 09/29/2021   Component Date Value    Ventricular Rate 09/29/2021 72     Atrial Rate 09/29/2021 72     P-R Interval 09/29/2021 234     QRS Duration 09/29/2021 90     Q-T Interval 09/29/2021 418     QTc Calculation (Bazett) 09/29/2021 457     P Axis 09/29/2021 74     R Axis 09/29/2021 88     T Axis 09/29/2021 74     BUN 09/29/2021 5*    CREATININE 09/29/2021 0.45*    GFR Non- 09/29/2021 >60     GFR  09/29/2021 >60     GFR Comment 09/29/2021          GFR Staging 09/29/2021 NOT REPORTED     Glucose 09/29/2021 98     Sodium 09/29/2021 124*    Potassium 09/29/2021 4.2     Chloride 09/29/2021 89*    CO2 09/29/2021 24     Anion Gap 09/29/2021 11     Hemoglobin 09/29/2021 12.6     Hematocrit 09/29/2021 39.7    Admission on 09/10/2021, Discharged on 09/10/2021   Component Date Value    Glucose 09/10/2021 123*    BUN 09/10/2021 4*    CREATININE 09/10/2021 0.46*    Bun/Cre Ratio 09/10/2021 9     Calcium 09/10/2021 9.4     Sodium 09/10/2021 139     Potassium 09/10/2021 3.9     Chloride 09/10/2021 103     CO2 09/10/2021 28     Anion Gap 09/10/2021 8*    GFR Non- 09/10/2021 >60     GFR  09/10/2021 >60     GFR Comment 09/10/2021          GFR Staging 09/10/2021 NOT REPORTED     Surgical Pathology Report 09/10/2021                      Value:-- Diagnosis --    Bone, L5, core biopsy:  -  Reactive bone with intramedullary reactive fibrosis. -  Negative for acute inflammation and malignancy. YANNICK Gore  **Electronically Signed Out**         rdd/9/14/2021       Clinical Information  Pre-op Diagnosis:  PATHOLOGIC COMPRESSION FRACTURE L5, INTRACTABLE LOW  BACK PAIN   Operative Findings:  L5 BONE BIOPSY   Operation Performed:  KYPHOPLASTY     Source of Specimen  1: L5 BONE BIOPSY    Gross Description  \"PAM ALIVIA, L5 BONE BIOPSY\" 1.3 cm long x 0.3 cm in diameter  disrupted tan bone core. Entirely 1cs after decalcification. tm      Microscopic Description  Core biopsies sample benign, reactive bone with changes compatible  with compression fracture. There are foci of maturing trilineage  hematopoiesis. The biopsy is evaluated utilizing immunostains for   and pancytokeratin and these stains are negative for evidence of  micro-metastatic disease and myelomatous infiltrate. There is no  acute inflammat                          ion or malignancy in the biopsy. SURGICAL PATHOLOGY CONSULTATION       Patient Name: Severa Magnus Med Rec: [id number]  Path Number: [id number]    [employer]  [facility]  ANATOMIC PATHOLOGY  [address] [place] [address],  [address] [place] [address]   [facility], [address]  [phone]  Fax: [phone]     Hospital Outpatient Visit on 08/23/2021   Component Date Value    Fecal Pancreatic Elastas* 08/23/2021 628 No results displayed because visit has over 200 results. Assessment:       45 y.o. patient with planned surgery as above. Known risk factors for perioperative complications: hyponatremia, seizure disorder, chronic pancreatitis  Current medications which may produce withdrawal symptoms if withheld perioperatively: none      Plan:     1. Preoperative workup as follows: electrolytes  2. Change in medication regimen before surgery: none  3. Prophylaxis for cardiac events with perioperative beta-blockers: Not indicated  ACC/AHA indications for pre-operative beta-blocker use:    · Vascular surgery with history of postitive stress test  · Intermediate or high risk surgery with history of CAD   · Intermediate or high risk surgery with multiple clinical predictors of CAD- 2 of the following: history of compensated or prior heart failure, history of cerebrovascular disease, DM, or renal insufficiency    Routine administration of higher-dose, long-acting metoprolol in beta-blocker-naïve patients on the day of surgery, and in the absence of dose titration is associated with an overall increase in mortality. Beta-blockers should be started days to weeks prior to surgery and titrated to pulse < 70.  4. Deep vein thrombosis prophylaxis: regimen to be chosen by surgical team  5. Pending repeat sodium level tomorrow - sodium improved to 131 from 124 with TID sodium chloride supplementation - increase to four times daily, will write clearance letter based on repeat sodium level.    6. Refer to nephrology for assessment of recurrent hyponatremia

## 2021-10-05 ENCOUNTER — HOSPITAL ENCOUNTER (OUTPATIENT)
Age: 51
Setting detail: SPECIMEN
Discharge: HOME | End: 2021-10-05
Payer: MEDICARE

## 2021-10-05 ENCOUNTER — TELEPHONE (OUTPATIENT)
Dept: GASTROENTEROLOGY | Age: 51
End: 2021-10-05

## 2021-10-05 DIAGNOSIS — E87.1 HYPONATREMIA: ICD-10-CM

## 2021-10-05 DIAGNOSIS — G62.9 PERIPHERAL POLYNEUROPATHY: ICD-10-CM

## 2021-10-05 DIAGNOSIS — E87.1 HYPONATREMIA: Primary | ICD-10-CM

## 2021-10-05 DIAGNOSIS — I10 ESSENTIAL HYPERTENSION: ICD-10-CM

## 2021-10-05 LAB — SODIUM BLD-SCNC: 125 MMOL/L (ref 135–144)

## 2021-10-05 NOTE — TELEPHONE ENCOUNTER
Rec'd notification from pre-op at [facility] that pt is scheduled for procedure with Dr Eric Waddell on 10/6/21 and procedure with Dr Shirley Sam on 10/7/21 and pt is not able to have procedures back to back. Called pt regarding this; she states she needs to cancel procedure with Dr Shirley Sam for now.  She will call back at a later time to r/s ERCP with EUS for stent removal
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
## 2021-10-06 DIAGNOSIS — F10.21 ALCOHOL USE DISORDER, SEVERE, IN EARLY REMISSION (HCC): ICD-10-CM

## 2021-10-06 RX ORDER — AMLODIPINE BESYLATE 5 MG/1
TABLET ORAL
Qty: 90 TABLET | Refills: 1 | Status: SHIPPED | OUTPATIENT
Start: 2021-10-06 | End: 2022-04-04 | Stop reason: SDUPTHER

## 2021-10-06 RX ORDER — SPIRONOLACTONE 50 MG/1
TABLET, FILM COATED ORAL
Qty: 90 TABLET | Refills: 1 | Status: SHIPPED | OUTPATIENT
Start: 2021-10-06 | End: 2022-01-05 | Stop reason: ALTCHOICE

## 2021-10-06 RX ORDER — TOPIRAMATE 50 MG/1
TABLET, FILM COATED ORAL
Qty: 180 TABLET | Refills: 1 | Status: SHIPPED
Start: 2021-10-06 | End: 2021-12-22 | Stop reason: DRUGHIGH

## 2021-10-06 RX ORDER — POTASSIUM CHLORIDE 1500 MG/1
TABLET, EXTENDED RELEASE ORAL
Qty: 90 TABLET | Refills: 1 | Status: SHIPPED | OUTPATIENT
Start: 2021-10-06 | End: 2021-11-18 | Stop reason: ALTCHOICE

## 2021-10-06 RX ORDER — ESCITALOPRAM OXALATE 5 MG/1
TABLET ORAL
Qty: 30 TABLET | Refills: 1 | Status: SHIPPED | OUTPATIENT
Start: 2021-10-06 | End: 2021-12-13

## 2021-10-06 RX ORDER — BUSPIRONE HYDROCHLORIDE 30 MG/1
TABLET ORAL
Qty: 60 TABLET | Refills: 0 | Status: SHIPPED | OUTPATIENT
Start: 2021-10-06 | End: 2021-11-11

## 2021-10-06 RX ORDER — CARBAMAZEPINE 200 MG/1
TABLET ORAL
Qty: 90 TABLET | Refills: 0 | Status: SHIPPED | OUTPATIENT
Start: 2021-10-06 | End: 2021-12-13

## 2021-10-07 ENCOUNTER — TELEPHONE (OUTPATIENT)
Dept: GASTROENTEROLOGY | Age: 51
End: 2021-10-07

## 2021-10-07 RX ORDER — ACAMPROSATE CALCIUM 333 MG/1
TABLET, DELAYED RELEASE ORAL
Qty: 180 TABLET | Refills: 0 | Status: SHIPPED | OUTPATIENT
Start: 2021-10-07 | End: 2021-11-01 | Stop reason: ALTCHOICE

## 2021-10-07 NOTE — TELEPHONE ENCOUNTER
Patient called on 10/7/21 and stated that she needs to reschedule her EUS and ERCP with Dr. Arlyn Blanchard will route this message to the procedure schedulers to get this done. Sydney Rashid

## 2021-10-08 ENCOUNTER — HOSPITAL ENCOUNTER (OUTPATIENT)
Dept: MAMMOGRAPHY | Age: 51
Discharge: HOME | End: 2021-10-10
Payer: MEDICARE

## 2021-10-08 ENCOUNTER — HOSPITAL ENCOUNTER (OUTPATIENT)
Age: 51
Discharge: HOME | End: 2021-10-08
Payer: MEDICARE

## 2021-10-08 DIAGNOSIS — E87.1 HYPONATREMIA: ICD-10-CM

## 2021-10-08 DIAGNOSIS — M80.00XS OSTEOPOROSIS WITH CURRENT PATHOLOGICAL FRACTURE, UNSPECIFIED OSTEOPOROSIS TYPE, SEQUELA: ICD-10-CM

## 2021-10-08 DIAGNOSIS — G62.9 PERIPHERAL POLYNEUROPATHY: ICD-10-CM

## 2021-10-08 DIAGNOSIS — E87.1 HYPONATREMIA: Primary | ICD-10-CM

## 2021-10-08 LAB
ANION GAP SERPL CALCULATED.3IONS-SCNC: 12 MMOL/L (ref 9–17)
BUN BLDV-MCNC: 7 MG/DL (ref 6–20)
BUN/CREAT BLD: ABNORMAL (ref 9–20)
CALCIUM SERPL-MCNC: 9.4 MG/DL (ref 8.6–10.4)
CHLORIDE BLD-SCNC: 92 MMOL/L (ref 98–107)
CO2: 26 MMOL/L (ref 20–31)
CREAT SERPL-MCNC: 0.42 MG/DL (ref 0.5–0.9)
GFR AFRICAN AMERICAN: >60 ML/MIN
GFR NON-AFRICAN AMERICAN: >60 ML/MIN
GFR SERPL CREATININE-BSD FRML MDRD: ABNORMAL ML/MIN/{1.73_M2}
GFR SERPL CREATININE-BSD FRML MDRD: ABNORMAL ML/MIN/{1.73_M2}
GLUCOSE BLD-MCNC: 101 MG/DL (ref 70–99)
OSMOLALITY URINE: 485 MOSM/KG (ref 80–1300)
POTASSIUM SERPL-SCNC: 4.5 MMOL/L (ref 3.7–5.3)
SODIUM BLD-SCNC: 130 MMOL/L (ref 135–144)
SODIUM,UR: 74 MMOL/L

## 2021-10-08 PROCEDURE — 84300 ASSAY OF URINE SODIUM: CPT

## 2021-10-08 PROCEDURE — 80048 BASIC METABOLIC PNL TOTAL CA: CPT

## 2021-10-08 PROCEDURE — 36415 COLL VENOUS BLD VENIPUNCTURE: CPT

## 2021-10-08 PROCEDURE — 83935 ASSAY OF URINE OSMOLALITY: CPT

## 2021-10-08 PROCEDURE — 77080 DXA BONE DENSITY AXIAL: CPT

## 2021-10-08 RX ORDER — PREGABALIN 200 MG/1
200 CAPSULE ORAL 3 TIMES DAILY
Qty: 90 CAPSULE | Refills: 2 | OUTPATIENT
Start: 2021-10-08 | End: 2022-01-06

## 2021-10-08 NOTE — TELEPHONE ENCOUNTER
Called pt back; she is now scheduled for STV Dr Mayda Blood pt), ERCP/EUS for stent removal, em, 10/21/21 at 1230pm in [address] [place] [address], [facility]. Prep given over phone and sent to pt via [employer].

## 2021-10-09 RX ORDER — PREGABALIN 200 MG/1
200 CAPSULE ORAL 3 TIMES DAILY
Qty: 90 CAPSULE | Refills: 5 | Status: SHIPPED | OUTPATIENT
Start: 2021-10-09 | End: 2022-04-25

## 2021-10-11 ENCOUNTER — HOSPITAL ENCOUNTER (OUTPATIENT)
Dept: PSYCHIATRY | Age: 51
Setting detail: THERAPIES SERIES
Discharge: HOME | End: 2021-10-11
Payer: MEDICARE

## 2021-10-11 ENCOUNTER — HOSPITAL ENCOUNTER (OUTPATIENT)
Age: 51
Discharge: HOME | DRG: 426 | End: 2021-10-11
Payer: MEDICARE

## 2021-10-11 DIAGNOSIS — E87.1 HYPONATREMIA: ICD-10-CM

## 2021-10-11 LAB
ANION GAP SERPL CALCULATED.3IONS-SCNC: 8 MMOL/L (ref 9–17)
BUN BLDV-MCNC: 5 MG/DL (ref 6–20)
BUN/CREAT BLD: ABNORMAL (ref 9–20)
CALCIUM SERPL-MCNC: 9 MG/DL (ref 8.6–10.4)
CHLORIDE BLD-SCNC: 92 MMOL/L (ref 98–107)
CO2: 23 MMOL/L (ref 20–31)
CREAT SERPL-MCNC: 0.48 MG/DL (ref 0.5–0.9)
GFR AFRICAN AMERICAN: >60 ML/MIN
GFR NON-AFRICAN AMERICAN: >60 ML/MIN
GFR SERPL CREATININE-BSD FRML MDRD: ABNORMAL ML/MIN/{1.73_M2}
GFR SERPL CREATININE-BSD FRML MDRD: ABNORMAL ML/MIN/{1.73_M2}
GLUCOSE BLD-MCNC: 105 MG/DL (ref 70–99)
MAGNESIUM: 1.6 MG/DL (ref 1.6–2.6)
POTASSIUM SERPL-SCNC: 4.4 MMOL/L (ref 3.7–5.3)
SODIUM BLD-SCNC: 123 MMOL/L (ref 135–144)

## 2021-10-11 PROCEDURE — 90837 PSYTX W PT 60 MINUTES: CPT | Performed by: SOCIAL WORKER

## 2021-10-11 PROCEDURE — 83735 ASSAY OF MAGNESIUM: CPT

## 2021-10-11 PROCEDURE — 80048 BASIC METABOLIC PNL TOTAL CA: CPT

## 2021-10-11 PROCEDURE — 36415 COLL VENOUS BLD VENIPUNCTURE: CPT

## 2021-10-12 ENCOUNTER — HOSPITAL ENCOUNTER (INPATIENT)
Age: 51
LOS: 1 days | Discharge: LEFT BEFORE DISCHARGE | DRG: 426 | End: 2021-10-12
Attending: EMERGENCY MEDICINE | Admitting: INTERNAL MEDICINE
Payer: MEDICARE

## 2021-10-12 VITALS
SYSTOLIC BLOOD PRESSURE: 131 MMHG | RESPIRATION RATE: 20 BRPM | TEMPERATURE: 98.1 F | HEART RATE: 95 BPM | DIASTOLIC BLOOD PRESSURE: 91 MMHG | OXYGEN SATURATION: 98 %

## 2021-10-12 DIAGNOSIS — E87.1 HYPONATREMIA: Primary | ICD-10-CM

## 2021-10-12 PROBLEM — D50.9 IRON DEFICIENCY ANEMIA: Status: ACTIVE | Noted: 2021-10-12

## 2021-10-12 LAB
-: NORMAL
ABSOLUTE EOS #: 0.08 K/UL (ref 0–0.44)
ABSOLUTE IMMATURE GRANULOCYTE: 0.06 K/UL (ref 0–0.3)
ABSOLUTE LYMPH #: 3.69 K/UL (ref 1.1–3.7)
ABSOLUTE MONO #: 0.93 K/UL (ref 0.1–1.2)
ALBUMIN SERPL-MCNC: 3.8 G/DL (ref 3.5–5.2)
ALBUMIN/GLOBULIN RATIO: 1.1 (ref 1–2.5)
ALP BLD-CCNC: 211 U/L (ref 35–104)
ALT SERPL-CCNC: 7 U/L (ref 5–33)
AMORPHOUS: NORMAL
ANION GAP SERPL CALCULATED.3IONS-SCNC: 13 MMOL/L (ref 9–17)
AST SERPL-CCNC: 14 U/L
BACTERIA: NORMAL
BASOPHILS # BLD: 0 % (ref 0–2)
BASOPHILS ABSOLUTE: 0.03 K/UL (ref 0–0.2)
BILIRUB SERPL-MCNC: 0.19 MG/DL (ref 0.3–1.2)
BILIRUBIN DIRECT: <0.08 MG/DL
BILIRUBIN URINE: NEGATIVE
BILIRUBIN, INDIRECT: ABNORMAL MG/DL (ref 0–1)
BUN BLDV-MCNC: 4 MG/DL (ref 6–20)
BUN/CREAT BLD: ABNORMAL (ref 9–20)
CALCIUM SERPL-MCNC: 9.4 MG/DL (ref 8.6–10.4)
CASTS UA: NORMAL /LPF (ref 0–8)
CHLORIDE BLD-SCNC: 92 MMOL/L (ref 98–107)
CO2: 21 MMOL/L (ref 20–31)
COLOR: YELLOW
CREAT SERPL-MCNC: 0.31 MG/DL (ref 0.5–0.9)
CRYSTALS, UA: NORMAL /HPF
DIFFERENTIAL TYPE: ABNORMAL
EOSINOPHILS RELATIVE PERCENT: 1 % (ref 1–4)
EPITHELIAL CELLS UA: NORMAL /HPF (ref 0–5)
ETHANOL PERCENT: <0.01 %
ETHANOL: <10 MG/DL
GFR AFRICAN AMERICAN: >60 ML/MIN
GFR NON-AFRICAN AMERICAN: >60 ML/MIN
GFR SERPL CREATININE-BSD FRML MDRD: ABNORMAL ML/MIN/{1.73_M2}
GFR SERPL CREATININE-BSD FRML MDRD: ABNORMAL ML/MIN/{1.73_M2}
GLOBULIN: ABNORMAL G/DL (ref 1.5–3.8)
GLUCOSE BLD-MCNC: 104 MG/DL (ref 70–99)
GLUCOSE URINE: NEGATIVE
HCT VFR BLD CALC: 39.9 % (ref 36.3–47.1)
HEMOGLOBIN: 13 G/DL (ref 11.9–15.1)
IMMATURE GRANULOCYTES: 0 %
KETONES, URINE: NEGATIVE
LEUKOCYTE ESTERASE, URINE: NEGATIVE
LYMPHOCYTES # BLD: 27 % (ref 24–43)
MAGNESIUM: 1.6 MG/DL (ref 1.6–2.6)
MCH RBC QN AUTO: 29 PG (ref 25.2–33.5)
MCHC RBC AUTO-ENTMCNC: 32.6 G/DL (ref 28.4–34.8)
MCV RBC AUTO: 88.9 FL (ref 82.6–102.9)
MONOCYTES # BLD: 7 % (ref 3–12)
MUCUS: NORMAL
NITRITE, URINE: NEGATIVE
NRBC AUTOMATED: 0 PER 100 WBC
OSMOLALITY URINE: 301 MOSM/KG (ref 80–1300)
OTHER OBSERVATIONS UA: NORMAL
PDW BLD-RTO: 13.2 % (ref 11.8–14.4)
PH UA: 8 (ref 5–8)
PLATELET # BLD: 462 K/UL (ref 138–453)
PLATELET ESTIMATE: ABNORMAL
PMV BLD AUTO: 9.9 FL (ref 8.1–13.5)
POTASSIUM SERPL-SCNC: 4.7 MMOL/L (ref 3.7–5.3)
PROTEIN UA: NEGATIVE
RBC # BLD: 4.49 M/UL (ref 3.95–5.11)
RBC # BLD: ABNORMAL 10*6/UL
RBC UA: NORMAL /HPF (ref 0–4)
REASON FOR REJECTION: NORMAL
REASON FOR REJECTION: NORMAL
RENAL EPITHELIAL, UA: NORMAL /HPF
SEG NEUTROPHILS: 65 % (ref 36–65)
SEGMENTED NEUTROPHILS ABSOLUTE COUNT: 8.66 K/UL (ref 1.5–8.1)
SERUM OSMOLALITY: 261 MOSM/KG (ref 275–295)
SODIUM BLD-SCNC: 126 MMOL/L (ref 135–144)
SODIUM,UR: 87 MMOL/L
SPECIFIC GRAVITY UA: 1.01 (ref 1–1.03)
TOTAL PROTEIN: 7.3 G/DL (ref 6.4–8.3)
TRICHOMONAS: NORMAL
TURBIDITY: CLEAR
URINE HGB: NEGATIVE
UROBILINOGEN, URINE: NORMAL
WBC # BLD: 13.5 K/UL (ref 3.5–11.3)
WBC # BLD: ABNORMAL 10*3/UL
WBC UA: NORMAL /HPF (ref 0–5)
YEAST: NORMAL
ZZ NTE CLEAN UP: ORDERED TEST: NORMAL
ZZ NTE CLEAN UP: ORDERED TEST: NORMAL
ZZ NTE WITH NAME CLEAN UP: SPECIMEN SOURCE: NORMAL
ZZ NTE WITH NAME CLEAN UP: SPECIMEN SOURCE: NORMAL

## 2021-10-12 PROCEDURE — 80076 HEPATIC FUNCTION PANEL: CPT

## 2021-10-12 PROCEDURE — G0378 HOSPITAL OBSERVATION PER HR: HCPCS

## 2021-10-12 PROCEDURE — 81001 URINALYSIS AUTO W/SCOPE: CPT

## 2021-10-12 PROCEDURE — 83935 ASSAY OF URINE OSMOLALITY: CPT

## 2021-10-12 PROCEDURE — 83930 ASSAY OF BLOOD OSMOLALITY: CPT

## 2021-10-12 PROCEDURE — 96374 THER/PROPH/DIAG INJ IV PUSH: CPT

## 2021-10-12 PROCEDURE — 6370000000 HC RX 637 (ALT 250 FOR IP): Performed by: STUDENT IN AN ORGANIZED HEALTH CARE EDUCATION/TRAINING PROGRAM

## 2021-10-12 PROCEDURE — 99283 EMERGENCY DEPT VISIT LOW MDM: CPT

## 2021-10-12 PROCEDURE — 85025 COMPLETE CBC W/AUTO DIFF WBC: CPT

## 2021-10-12 PROCEDURE — 6370000000 HC RX 637 (ALT 250 FOR IP): Performed by: NURSE PRACTITIONER

## 2021-10-12 PROCEDURE — 99222 1ST HOSP IP/OBS MODERATE 55: CPT | Performed by: NURSE PRACTITIONER

## 2021-10-12 PROCEDURE — 96375 TX/PRO/DX INJ NEW DRUG ADDON: CPT

## 2021-10-12 PROCEDURE — 83735 ASSAY OF MAGNESIUM: CPT

## 2021-10-12 PROCEDURE — 1200000000 HC SEMI PRIVATE

## 2021-10-12 PROCEDURE — 6360000002 HC RX W HCPCS: Performed by: STUDENT IN AN ORGANIZED HEALTH CARE EDUCATION/TRAINING PROGRAM

## 2021-10-12 PROCEDURE — G0480 DRUG TEST DEF 1-7 CLASSES: HCPCS

## 2021-10-12 PROCEDURE — 99222 1ST HOSP IP/OBS MODERATE 55: CPT | Performed by: INTERNAL MEDICINE

## 2021-10-12 PROCEDURE — 80048 BASIC METABOLIC PNL TOTAL CA: CPT

## 2021-10-12 PROCEDURE — 84300 ASSAY OF URINE SODIUM: CPT

## 2021-10-12 RX ORDER — ALBUTEROL SULFATE 90 UG/1
2 AEROSOL, METERED RESPIRATORY (INHALATION) EVERY 4 HOURS PRN
Status: CANCELLED | OUTPATIENT
Start: 2021-10-12

## 2021-10-12 RX ORDER — CARBAMAZEPINE 200 MG/1
200 TABLET ORAL 3 TIMES DAILY
Status: CANCELLED | OUTPATIENT
Start: 2021-10-12

## 2021-10-12 RX ORDER — ACETAMINOPHEN 325 MG/1
650 TABLET ORAL EVERY 6 HOURS PRN
Status: DISCONTINUED | OUTPATIENT
Start: 2021-10-12 | End: 2021-10-13 | Stop reason: HOSPADM

## 2021-10-12 RX ORDER — FERROUS SULFATE 325(65) MG
325 TABLET ORAL 2 TIMES DAILY
Status: CANCELLED | OUTPATIENT
Start: 2021-10-12

## 2021-10-12 RX ORDER — DULOXETIN HYDROCHLORIDE 30 MG/1
60 CAPSULE, DELAYED RELEASE ORAL DAILY
Status: CANCELLED | OUTPATIENT
Start: 2021-10-12

## 2021-10-12 RX ORDER — SODIUM CHLORIDE 0.9 % (FLUSH) 0.9 %
10 SYRINGE (ML) INJECTION PRN
Status: CANCELLED | OUTPATIENT
Start: 2021-10-12

## 2021-10-12 RX ORDER — PANTOPRAZOLE SODIUM 40 MG/1
40 TABLET, DELAYED RELEASE ORAL
Status: CANCELLED | OUTPATIENT
Start: 2021-10-13

## 2021-10-12 RX ORDER — MAGNESIUM SULFATE 1 G/100ML
1000 INJECTION INTRAVENOUS PRN
Status: CANCELLED | OUTPATIENT
Start: 2021-10-12

## 2021-10-12 RX ORDER — FOLIC ACID 1 MG/1
1 TABLET ORAL DAILY
Status: CANCELLED | OUTPATIENT
Start: 2021-10-12

## 2021-10-12 RX ORDER — ACETAMINOPHEN 500 MG
1000 TABLET ORAL ONCE
Status: COMPLETED | OUTPATIENT
Start: 2021-10-12 | End: 2021-10-12

## 2021-10-12 RX ORDER — METOCLOPRAMIDE 10 MG/1
5 TABLET ORAL 3 TIMES DAILY
Status: CANCELLED | OUTPATIENT
Start: 2021-10-12

## 2021-10-12 RX ORDER — PROCHLORPERAZINE EDISYLATE 5 MG/ML
5 INJECTION INTRAMUSCULAR; INTRAVENOUS ONCE
Status: COMPLETED | OUTPATIENT
Start: 2021-10-12 | End: 2021-10-12

## 2021-10-12 RX ORDER — SODIUM CHLORIDE 9 MG/ML
25 INJECTION, SOLUTION INTRAVENOUS PRN
Status: CANCELLED | OUTPATIENT
Start: 2021-10-12

## 2021-10-12 RX ORDER — DIPHENHYDRAMINE HYDROCHLORIDE 50 MG/ML
25 INJECTION INTRAMUSCULAR; INTRAVENOUS ONCE
Status: COMPLETED | OUTPATIENT
Start: 2021-10-12 | End: 2021-10-12

## 2021-10-12 RX ORDER — AMLODIPINE BESYLATE 5 MG/1
1 TABLET ORAL DAILY
Status: CANCELLED | OUTPATIENT
Start: 2021-10-12

## 2021-10-12 RX ORDER — POTASSIUM CHLORIDE 20 MEQ/1
40 TABLET, EXTENDED RELEASE ORAL PRN
Status: CANCELLED | OUTPATIENT
Start: 2021-10-12

## 2021-10-12 RX ORDER — ACETAMINOPHEN 650 MG/1
650 SUPPOSITORY RECTAL EVERY 6 HOURS PRN
Status: DISCONTINUED | OUTPATIENT
Start: 2021-10-12 | End: 2021-10-13 | Stop reason: HOSPADM

## 2021-10-12 RX ORDER — ONDANSETRON 4 MG/1
4 TABLET, ORALLY DISINTEGRATING ORAL EVERY 8 HOURS PRN
Status: CANCELLED | OUTPATIENT
Start: 2021-10-12

## 2021-10-12 RX ORDER — ROPINIROLE 1 MG/1
1 TABLET, FILM COATED ORAL NIGHTLY
Status: CANCELLED | OUTPATIENT
Start: 2021-10-12

## 2021-10-12 RX ORDER — BUDESONIDE AND FORMOTEROL FUMARATE DIHYDRATE 160; 4.5 UG/1; UG/1
2 AEROSOL RESPIRATORY (INHALATION) 2 TIMES DAILY
Status: CANCELLED | OUTPATIENT
Start: 2021-10-12

## 2021-10-12 RX ORDER — GAUZE BANDAGE 2" X 2"
100 BANDAGE TOPICAL DAILY
Status: CANCELLED | OUTPATIENT
Start: 2021-10-12

## 2021-10-12 RX ORDER — POLYETHYLENE GLYCOL 3350 17 G/17G
17 POWDER, FOR SOLUTION ORAL DAILY PRN
Status: CANCELLED | OUTPATIENT
Start: 2021-10-12

## 2021-10-12 RX ORDER — BUSPIRONE HYDROCHLORIDE 15 MG/1
30 TABLET ORAL 2 TIMES DAILY
Status: CANCELLED | OUTPATIENT
Start: 2021-10-12

## 2021-10-12 RX ORDER — SODIUM CHLORIDE 0.9 % (FLUSH) 0.9 %
5-40 SYRINGE (ML) INJECTION EVERY 12 HOURS SCHEDULED
Status: CANCELLED | OUTPATIENT
Start: 2021-10-12

## 2021-10-12 RX ORDER — TRAZODONE HYDROCHLORIDE 50 MG/1
50 TABLET ORAL NIGHTLY PRN
Status: CANCELLED | OUTPATIENT
Start: 2021-10-12

## 2021-10-12 RX ORDER — BACLOFEN 10 MG/1
10 TABLET ORAL 3 TIMES DAILY
Status: CANCELLED | OUTPATIENT
Start: 2021-10-12

## 2021-10-12 RX ORDER — SPIRONOLACTONE 50 MG/1
1 TABLET, FILM COATED ORAL DAILY
Status: CANCELLED | OUTPATIENT
Start: 2021-10-12

## 2021-10-12 RX ORDER — POTASSIUM CHLORIDE 7.45 MG/ML
10 INJECTION INTRAVENOUS PRN
Status: CANCELLED | OUTPATIENT
Start: 2021-10-12

## 2021-10-12 RX ORDER — ONDANSETRON 2 MG/ML
4 INJECTION INTRAMUSCULAR; INTRAVENOUS EVERY 6 HOURS PRN
Status: CANCELLED | OUTPATIENT
Start: 2021-10-12

## 2021-10-12 RX ADMIN — ACETAMINOPHEN 650 MG: 325 TABLET ORAL at 20:30

## 2021-10-12 RX ADMIN — PROCHLORPERAZINE EDISYLATE 5 MG: 5 INJECTION INTRAMUSCULAR; INTRAVENOUS at 16:00

## 2021-10-12 RX ADMIN — DIPHENHYDRAMINE HYDROCHLORIDE 25 MG: 50 INJECTION, SOLUTION INTRAMUSCULAR; INTRAVENOUS at 15:59

## 2021-10-12 RX ADMIN — ACETAMINOPHEN 1000 MG: 500 TABLET ORAL at 15:59

## 2021-10-12 ASSESSMENT — PAIN SCALES - GENERAL
PAINLEVEL_OUTOF10: 5
PAINLEVEL_OUTOF10: 6

## 2021-10-12 ASSESSMENT — ENCOUNTER SYMPTOMS
COUGH: 0
ABDOMINAL PAIN: 0
CHEST TIGHTNESS: 0
CONSTIPATION: 0
COLOR CHANGE: 0
BACK PAIN: 1
SHORTNESS OF BREATH: 0
DIARRHEA: 0
WHEEZING: 0
VOMITING: 0
NAUSEA: 0

## 2021-10-12 NOTE — ED NOTES
Pt denies any changes in health.      States that she was sent per nephro for eval for low na+     Lisa Rivera RN  10/12/21 2021

## 2021-10-12 NOTE — CONSULTS
Nephrology Consult Note    Reason for Consult: Hyponatremia  Requesting Physician:  Hawk Collado    Chief Complaint: Weakness, abnormal labs  History Obtained From:  patient, electronic medical record    History of Present Illness: This is a 45 y.o. female who presents with weakness and abnormal labs. She has a history of variable serum sodium levels with hyponatremia and lab work earlier today showed a serum sodium of 123 mmol/L. She had normal renal function. She was asked to come to the emergency room. She says she always has some degree of weakness, especially in her legs. She also has a history of seizure disorder and is on Tegretol. She also takes Lexapro and Cymbalta for depression, SSRI which is a risk factor for hyponatremia. The patient also is on Lyrica, acamprosate, Topamax, BuSpar, spironolactone, amlodipine, K-Geri con, amoxicillin, chlorhexidine solution orally, tramadol as needed, sodium chloride tablets 1 g 4 times a day. Other medications include simethicone, Atarax, Reglan, omeprazole, Creon pancreatic enzymes, baclofen, ferrous sulfate, Requip at night, Symbicort, Spiriva inhaler, albuterol inhaler, vitamin B1, ergocalciferol and folic acid. Past medical history includes alcoholism with withdrawal syndrome, seizure disorder, anemia of blood loss secondary to GI bleed, acute respiratory failure, anxiety disorder, astrocytoma, COPD, depression with bipolar and major depression disorder, dysphagia, hypertension, memory loss, closed fracture of lateral portion of left tibia, peripheral neuropathy, tension headache, chronic pain syndrome under the care of a pain management team.      Past surgical history includes brain tumor excision x2, colonoscopy, EGD hysterectomy, endoscopic ultrasound of the pancreas, ORIF of left tibial plateau fracture, ERCP with stent insertion and sphincterotomy, kyphoplasty. She is single. She is a former alcohol user but no longer uses alcohol.   She does smoke cigarettes. She uses marijuana. Family history of cancer, including esophageal cancer, arthritis and heart disease. The patient denies any chest pain or shortness of breath and is on room air. He has a history of COPD and is on multiple inhalers. No current headache nausea or vomiting. She does have a history of gastroparesis and is on Reglan. No recent nausea or vomiting or significant pain syndrome including no current chest pain. Prior history of seizure disorder but no recent seizures, at least for the last year. She does feel some generalized weakness but no significant issues with ambulation today.     Past Medical History:        Diagnosis Date    Acute respiratory failure with hypoxia ([address] 10/16/2020    Alcohol withdrawal syndrome, with delirium ([address] 12/14/2019    Alcoholism ([address]     Anemia 10/2020    GI bleed    Anxiety     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24, the patient underwent 2 surgical resections without known recurrence 10/23/2020    Closed fracture of lateral portion of left tibial plateau 95/31/0679    COPD (chronic obstructive pulmonary disease) ([address]     CO2 retainer, on Bipap at night for this, Dr. Arelis Brothers ( last visit 11/20/2020 and note on chart )    Depression     bipolar, major depressive disorder, ptsd, anxiety    Dysphagia     GI bleed 10/2020    Hypertension     Memory loss     Oxygen dependent     Pain, joint, ankle and foot     Pancreatic lesion 10/2020    Dr. Inna Guan working up pt    Peripheral neuropathy     Seiz[facility]     also baseline tremors-last sz summer 2020    Tension headache     Under care of team 09/29/2021    neuro-Dr Coyle-[facility] ct-last visit sep 2021    Under care of team 09/29/2021    pain management-Hamzah Martines-nery jimenez-last visit sep 2021    Under care of team 09/29/2021    pulmonology-Dr Dueñas-[facility]-due for visit oct 26/2021    Under care of team 09/29/2021    psych-bahnfeldt NP-telemed-last visit may 2021    Under care of team 09/29/2021    ml-[address]-last visit aug 2021    Wellness examination 09/29/2021    pcp-Ludivina grimes-last visit june 2021       Past Surgical History:        Procedure Laterality Date    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    COLONOSCOPY N/A 10/22/2020    COLONOSCOPY DIAGNOSTIC performed by Ayden Geronimo MD at [address]  7/28/2021    CT ABSCESS DRAIN SUBCUTANEOUS 7/28/2021 [facility] CT SCAN    ENDOSCOPIC ULTRASOUND (LOWER) N/A 12/9/2020    ENDOSCOPIC ULTRASOUND, UPPER WITH LINEAR SCOPE FOR BIOPSY OF MASS ON HEAD OF PANCREAS performed by Matheus Marino MD at [facility] ERCP  08/11/2021    ERCP N/A 8/11/2021    ERCP STENT INSERTION [facility]grid [facility]z [facility] at [facility]    ERCP  8/11/2021    ERCP SPHINCTER/PAPILLOTOMY performed by Ingrid Pleitez MD at [facility] 7-3-13    ORIF tibial plateau    FRACTURE SURGERY Right     small finger metacarpal fracture    HAND SURGERY      pins    HYSTERECTOMY  2003    SPINE SURGERY N/A 9/10/2021    KYPHOPLASTY lumbar L5 performed by Delma Eng MD at [address] 10/22/2020    EGD BIOPSY performed by Ayden Geronimo MD at [address] [place] [address] 4/5/2021    EGD BIOPSY performed by Ayden Geronimo MD at [address] [place] [address] N/A 9/8/2021    ENDOSCOPIC ULTRASOUND, LINEAR SCOPE performed by Matheus Marino MD at [facility]       Current Medications:    No current facility-administered medications for this encounter. Allergies:  Patient has no known allergies.     Social History:   Social History     Socioeconomic History    Marital status: Single     Spouse name: Not on file    Number of children: Not on file    Years of education: Not on file    Highest education level: Not on file   Occupational History    Not on file   Tobacco Use    Smoking status: Current Every Day Smoker     Packs/day: 0.50     Years: 30.00     Pack years: 15.00     Types: Cigarettes    Smokeless tobacco: Never Used    Tobacco comment: plans on quitting in the future    Vaping Use    Vaping Use: Never used   Substance and Sexual Activity    Alcohol use: Not Currently     Alcohol/week: 0.0 standard drinks    Drug use: Yes     Frequency: 2.0 times per week     Types: Marijuana     Comment: last time 09/01/21    Sexual activity: Not Currently   Other Topics Concern    Not on file   Social History Narrative    Not on file     Social Determinants of Health     Financial Resource Strain: Medium Risk    Difficulty of Paying Living Expenses: Somewhat hard   Food Insecurity: No Food Insecurity    Worried About Running Out of Food in the Last Year: Never true    Tyler of Food in the Last Year: Never true   Transportation Needs:     Lack of Transportation (Medical):  Lack of Transportation (Non-Medical):    Physical Activity:     Days of Exercise per Week:     Minutes of Exercise per Session:    Stress:     Feeling of Stress :    Social Connections:     Frequency of Communication with Friends and Family:     Frequency of Social Gatherings with Friends and Family:     Attends [employer] Services:     Active Member of Clubs or Organizations:     Attends Club or Organization Meetings:     Marital Status:    Intimate Partner Violence:     Fear of Current or Ex-Partner:     Emotionally Abused:     Physically Abused:     Sexually Abused:        Family History:   Family History   Problem Relation Age of Onset    Other Father     Cancer Maternal Grandmother     Heart Disease Paternal Grandmother     Esophageal Cancer Maternal Aunt     Arthritis Mother        Review of Systems:    Pertinent positives stated above in HPI. All other systems were reviewed and were negative.     Objective:  CURRENT TEMPERATURE:  Temp: 98.1 °F (36.7 °C)  MAXIMUM TEMPERATURE OVER 24HRS:  Temp (24hrs), Av.1 °F (36.7 °C), Min:98.1 °F (36.7 °C), Max:98.1 °F (36.7 °C)    CURRENT RESPIRATORY RATE:  Resp: 20  CURRENT PULSE:  Pulse: 95  CURRENT BLOOD PRESSURE:  BP: (!) 131/91  24HR BLOOD PRESSURE RANGE:  Systolic (87QWC), NXD:577 , Min:131 , NZQ:199   ; Diastolic (40ZNM), YXF:18, Min:91, Max:91    24HR INTAKE/OUTPUT:  No intake or output data in the 24 hours ending 10/12/21 1725    Physical Exam:  General appearance:  Awake, alert, in no acute distress  Skin: warm and dry, no rash or erythema  Eyes: conjunctivae normal and sclera anicteric  ENT: :no thrush, moist mucous membranes  Neck: No JVD, midline trachea, no accessory muscle use  Pulmonary: Bilateral air entry with some scattered wheezes but no rales or rhonchi noted   Cardiovascular: Regular rate and rhythm with positive S1 and S2 and no rubs Abdomen: Soft and not tender not distended with active bowel sounds Extremities: No pitting peripheral edema appreciated    Labs:   CBC:   Recent Labs     10/12/21  1419   WBC 13.5*   RBC 4.49   HGB 13.0   HCT 39.9   MCV 88.9   MCH 29.0   MCHC 32.6   RDW 13.2   *   MPV 9.9      BMP:   Recent Labs     10/11/21  1525   *   K 4.4   CL 92*   CO2 23   BUN 5*   CREATININE 0.48*   GLUCOSE 105*   CALCIUM 9.0        Phosphorus:  No results for input(s): PHOS in the last 72 hours. Magnesium:   Recent Labs     10/11/21  1525   MG 1.6     Albumin: No results for input(s): LABALBU in the last 72 hours. SPEP:   Lab Results   Component Value Date    PROT 7.0 2021    ALBCAL 4.0 2021    ALBPCT 57 2021    LABALPH 0.3 2021    LABALPH 0.9 2021    A1PCT 4 2021    A2PCT 13 2021    LABBETA 0.9 2021    BETAPCT 13 2021    GAMGLOB 0.9 2021    GGPCT 13 2021    PATH ELECTRONICALLY SIGNED. Abdon Carter M.D. 2021     UPEP:   Lab Results   Component Value Date    TPU 51 2021        C3:  No results found for: C3  C4: No results found for: C4  MPO ANCA:    Lab Results   Component Value Date    MPO <0.3 04/13/2021    . PR3 ANCA:    Lab Results   Component Value Date    PR3 <0.7 04/13/2021     Urine Sodium:    Lab Results   Component Value Date    UNA 87 10/12/2021      Urine Potassium:  No results found for: KUR  Urine Chloride:  No components found for: CLU  Urine Ph:  No components found for: PO4U  Urine Osmolarity:    Lab Results   Component Value Date    OSMOU 301 10/12/2021     Urine Creatinine:  No results found for: LABCREA  Urine Eosinophils: No components found for: EOSU  Urine Protein:    Lab Results   Component Value Date    TPU 51 02/03/2021     Urinalysis:  U/A:   Lab Results   Component Value Date    NITRU NEGATIVE 10/12/2021    COLORU Yellow 10/12/2021    PHUR 8.0 10/12/2021    WBCUA None 10/12/2021    RBCUA None 10/12/2021    MUCUS NOT REPORTED 10/12/2021    TRICHOMONAS NOT REPORTED 10/12/2021    YEAST NOT REPORTED 10/12/2021    BACTERIA NOT REPORTED 10/12/2021    CLARITYU cloudy 12/18/2018    SPECGRAV 1.009 10/12/2021    LEUKOCYTESUR NEGATIVE 10/12/2021    UROBILINOGEN Normal 10/12/2021    BILIRUBINUR NEGATIVE 10/12/2021    BILIRUBINUR moderate 12/18/2018    BLOODU neg 12/18/2018    GLUCOSEU NEGATIVE 10/12/2021    [address] NEGATIVE 10/12/2021    AMORPHOUS NOT REPORTED 10/12/2021         Radiology:  Reviewed as available. Assessment:  1. Hyponatremia with normal volume status, likely secondary to increased ADH release and not solved completely with oral sodium chloride tablets. The patient's SSRI agents likely are causing a significant issue with hyponatremia with the prompting of increased ADH release  2. Essential hypertension with reasonable control  3. COPD with history of chronic cigarette use  4. Prior history of significant alcohol use and withdrawal       Plan:  1. Check serum osmolality, serum sodium, urine osmolality and urine sodium     2.   Longer-term, it would be reasonable to decrease/eliminate SSRI medications if possible  3. Continue oral sodium chloride tablets  4. The patient likely will benefit from an oral fluid restriction in the neighborhood of 1.2 L to 1.5 L a day    Thank you for the consultation. Please do not hesitate to call with questions.     Electronically signed by Aaron Blanton MD on 10/12/2021 at 5:25 PM

## 2021-10-12 NOTE — H&P
our service and discharged on 8/3 due to acute on chronic back pain requiring neurosurgery consultation due to acute on chronic compression fractures. She was instructed to follow-up in 4 to 6 weeks with neurosurgery after DEXA scan was completed. Further complicating her stay was sepsis related to diverticulitis requiring drain placement, and exudative pleural effusion. Patient was scheduled for EUS FNA completed on 9/10 without malignancy identified, patient still a pancreatic stent which is supposed to be removed on 10/21. Unfortunately she was getting preoperative lab work from her primary care office found to have a sodium level of 123. Patient has a history of seizures. Not sure if the preoperative lab work was for pancreatic stent removal or for removal she was sent to the emergency department for evaluation and further work-up. Nephrology was consulted on presentation.      Past Medical History:     Past Medical History:   Diagnosis Date    Acute respiratory failure with hypoxia ([address] 10/16/2020    Alcohol withdrawal syndrome, with delirium ([address] 12/14/2019    Alcoholism ([address]     Anemia 10/2020    GI bleed    Anxiety     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24, the patient underwent 2 surgical resections without known recurrence 10/23/2020    Closed fracture of lateral portion of left tibial plateau 26/81/1040    COPD (chronic obstructive pulmonary disease) (HCC)     CO2 retainer, on Bipap at night for this, Dr. Sydney Reddy ( last visit 11/20/2020 and note on chart )    Depression     bipolar, major depressive disorder, ptsd, anxiety    Dysphagia     GI bleed 10/2020    Hypertension     Memory loss     Oxygen dependent     Pain, joint, ankle and foot     Pancreatic lesion 10/2020    Dr. Neema Paniagua working up pt    Peripheral neuropathy     Seizures ([address]     also baseline tremors-last sz summer 2020    Tension headache     Under care of team 09/29/2021    neuro-Dr Coyle-[facility] ct-last visit sep 2021    Under care of team 09/29/2021    pain management-Hamzah Martines-ner[facility] jim[facility]-last visit sep 2021    Under care of team 09/29/2021    pulmonology-Dr Dueñas-[facility]-due for visit oct 26/2021    Under care of team 09/29/2021    psych-bahnfeldt NP-telemed-last visit may 2021    Under care of team 09/29/2021    es-[address]-last visit aug 2021    Wellness examination 09/29/2021    pcp-Ludivina Grimm-[address]-last visit june 2021        Past Surgical History:     Past Surgical History:   Procedure Laterality Date    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    COLONOSCOPY N/A 10/22/2020    COLONOSCOPY DIAGNOSTIC performed by Herbert Armenta MD at [facility]  7/28/2021    CT ABSCESS DRAIN SUBCUTANEOUS 7/28/2021 [facility] CT SCAN    ENDOSCOPIC ULTRASOUND (LOWER) N/A 12/9/2020    ENDOSCOPIC ULTRASOUND, UPPER WITH LINEAR SCOPE FOR BIOPSY OF MASS ON HEAD OF PANCREAS performed by Marlene Erickson MD at [facility] ERCP  08/11/2021    ERCP N/A 8/11/2021    ERCP STENT INSERTION performed by Connor Lemon MD at [facility]    ERCP  8/11/2021    ERCP SPHINCTER/PAPILLOTOMY performed by Connor Lemon MD at [address] [place] [address] 7-3-13    ORIF tibial plateau    FRACTURE SURGERY Right     small finger metacarpal fracture    HAND SURGERY      pins    HYSTERECTOMY  2003    SPINE SURGERY N/A 9/10/2021    KYPHOPLASTY lumbar L5 performed by Chilton Prader, MD at [facility] 10/22/2020    EGD BIOPSY performed by Herbert Armenta MD at [address] [place] [address] 4/5/2021    EGD BIOPSY performed by Herbert Armenta MD at [address] [place] [address] N/A 9/8/2021    ENDOSCOPIC ULTRASOUND, LINEAR SCOPE performed by Marlene Erickson MD at [address] [place] [address]        Medications Prior to Admission:     Prior to Admission medications    Medication Sig Start Date End Date Taking? Authorizing Provider   pregabalin (LYRICA) 200 MG capsule Take 1 capsule by mouth 3 times daily for 180 days.  10/9/21 4/7/22  Tran Mccarty MD   acamprosate (CAMPRAL) 333 MG tablet TAKE TWO TABLETS BY MOUTH THREE TIMES A DAY 10/7/21   LOI Obrien NP   carBAMazepine (TEGRETOL) 200 MG tablet TAKE ONE TABLET BY MOUTH THREE TIMES A DAY 10/6/21   Ludivina Lange MD   topiramate (TOPAMAX) 50 MG tablet TAKE ONE TABLET BY MOUTH TWICE A DAY 10/6/21   Ludivina Lange MD   busPIRone (BUSPAR) 30 MG tablet TAKE ONE TABLET BY MOUTH TWICE A DAY WITH A MEAL 10/6/21   LOI Obrien NP   spironolactone (ALDACTONE) 50 MG tablet TAKE ONE TABLET BY MOUTH DAILY 10/6/21   Ludivina Lange MD   amLODIPine (NORVASC) 5 MG tablet TAKE ONE TABLET BY MOUTH DAILY 10/6/21   Ludivina Lange MD   escitalopram (LEXAPRO) 5 MG tablet TAKE ONE TABLET BY MOUTH DAILY 10/6/21   LOI Obrien NP   KLOR-CON M20 20 MEQ extended release tablet TAKE ONE TABLET BY MOUTH DAILY 10/6/21   Ludivina Lange MD   amoxicillin (AMOXIL) 500 MG capsule  9/30/21   Alfie Gutiérrez, SRIKANTHS   chlorhexidine (PERIDEX) 0.12 % solution  9/30/21   Alfie Gutiérrez DDS   traMADol Cleta Aliment) 50 MG tablet  9/30/21   Genevieve Azar DDS   sodium chloride 1 g tablet Take 1 tablet by mouth 4 times daily 10/4/21   Ludivina Lange MD   zoster recombinant adjuvanted vaccine [facility]) 50 MCG/0.5ML SUSR injection Inject 0.5 mLs into the muscle See Admin Instructions 1 dose now and repeat in 2-6 months 10/4/21 4/2/22  Ludivina Lange MD   DULoxetine (CYMBALTA) 60 MG extended release capsule Take 60 mg by mouth daily  9/7/21   Historical Provider, MD   simethicone (MYLICON) 80 MG chewable tablet Take 1 tablet by mouth 4 times daily as needed for Flatulence 8/27/21   Hailee Martinez MD   hydrOXYzine (ATARAX) 25 MG tablet Take 1 tablet by mouth 3 times daily as needed for Anxiety 8/25/21   LOI Obrien NP metoclopramide (REGLAN) 5 MG tablet Take 1 tablet by mouth 3 times daily 8/19/21   Florin Kilgore MD   omeprazole (PRILOSEC) 40 MG delayed release capsule Take 1 capsule by mouth 2 times daily 8/19/21   Florin Kilgore MD   lipase-protease-amylase (CREON) 14776-76091 units delayed release capsule Take 1 capsule by mouth 3 times daily (with meals) 8/17/21   Ludivina Clinton MD   traZODone (DESYREL) 50 MG tablet Take 1 tablet by mouth nightly as needed for Sleep 8/5/21 10/4/21  LOI Garcia - NP   baclofen (LIORESAL) 10 MG tablet Take 1 tablet by mouth 3 times daily 5/25/21   Ludivina Clinton MD   ferrous sulfate (IRON 325) 325 (65 Fe) MG tablet Take 1 tablet by mouth 2 times daily 4/22/21   Ludivina Clinton MD   rOPINIRole (REQUIP) 1 MG tablet Take 1 tablet by mouth nightly 4/1/21   Ludivina Clinton MD   budesonide-formoterol [facility]) 160-4.5 MCG/ACT AERO Inhale 2 puffs into the lungs 2 times daily 3/31/21   Ludivina Clinton MD   tiotropium (SPIRIVA RESPIMAT) 2.5 MCG/ACT AERS inhaler Inhale 2 puffs into the lungs daily 2/26/21 10/4/21  Colleen Vogel MD   albuterol sulfate HFA (VENTOLIN HFA) 108 (90 Base) MCG/ACT inhaler Inhale 2 puffs into the lungs 4 times daily as needed for Wheezing 6/11/20   Ludivina Clinton MD   vitamin B-1 (THIAMINE) 100 MG tablet Take 1 tablet by mouth daily 7/12/19   Ludivina Clinton MD   vitamin D (ERGOCALCIFEROL) 79935 units CAPS capsule Take 1 capsule by mouth once a week 6/19/19   Ludivina Clinton MD   folic acid (FOLVITE) 1 MG tablet Take 1 tablet by mouth daily 6/19/19   Aracelis Gil MD        Allergies:     Patient has no known allergies. Social History:     Tobacco:    reports that she has been smoking cigarettes. She has a 15.00 pack-year smoking history. She has never used smokeless tobacco.  Alcohol:      reports previous alcohol use. Drug Use:  reports current drug use. Frequency: 2.00 times per week. Drug: Marijuana.     Family History:     Family thyroid not palpable  Lungs: Bilateral equal air entry, clear to ausculation, no wheezing, rales or rhonchi, normal effort  Cardiovascular: normal rate, regular rhythm, no murmur, gallop, rub  Abdomen: Soft, tender to deep palpation, nondistended, normal bowel sounds, no hepatomegaly or splenomegaly  Neurologic: There are no new focal motor or sensory deficits, normal muscle tone and bulk, no abnormal sensation, normal speech, cranial nerves II through XII grossly intact  Skin: No gross lesions, rashes, bruising or bleeding on exposed skin area  Extremities: peripheral pulses palpable, no pedal edema or calf pain with palpation  Psych: normal affect    Investigations:      Laboratory Testing:  Recent Results (from the past 24 hour(s))   CBC WITH AUTO DIFFERENTIAL    Collection Time: 10/12/21  2:19 PM   Result Value Ref Range    WBC 13.5 (H) 3.5 - 11.3 k/uL    RBC 4.49 3.95 - 5.11 m/uL    Hemoglobin 13.0 11.9 - 15.1 g/dL    Hematocrit 39.9 36.3 - 47.1 %    MCV 88.9 82.6 - 102.9 fL    MCH 29.0 25.2 - 33.5 pg    MCHC 32.6 28.4 - 34.8 g/dL    RDW 13.2 11.8 - 14.4 %    Platelets 533 (H) 946 - 453 k/uL    MPV 9.9 8.1 - 13.5 fL    NRBC Automated 0.0 0.0 per 100 WBC    Differential Type NOT REPORTED     Seg Neutrophils 65 36 - 65 %    Lymphocytes 27 24 - 43 %    Monocytes 7 3 - 12 %    Eosinophils % 1 1 - 4 %    Basophils 0 0 - 2 %    Immature Granulocytes 0 0 %    Segs Absolute 8.66 (H) 1.50 - 8.10 k/uL    Absolute Lymph # 3.69 1.10 - 3.70 k/uL    Absolute Mono # 0.93 0.10 - 1.20 k/uL    Absolute Eos # 0.08 0.00 - 0.44 k/uL    Basophils Absolute 0.03 0.00 - 0.20 k/uL    Absolute Immature Granulocyte 0.06 0.00 - 0.30 k/uL    WBC Morphology NOT REPORTED     RBC Morphology NOT REPORTED     Platelet Estimate NOT REPORTED    SPECIMEN REJECTION    Collection Time: 10/12/21  2:19 PM   Result Value Ref Range    Specimen Source . BLOOD     Ordered Test BMP, MG     Reason for Rejection Unable to perform testing: Specimen hemolyzed. - NOT REPORTED    SPECIMEN REJECTION    Collection Time: 10/12/21  3:10 PM   Result Value Ref Range    Specimen Source . BLOOD     Ordered Test  BMP MG     Reason for Rejection Unable to perform testing: Specimen hemolyzed. - NOT REPORTED    Urinalysis with microscopic    Collection Time: 10/12/21  4:15 PM   Result Value Ref Range    Color, UA Yellow Yellow    Turbidity UA Clear Clear    Glucose, Ur NEGATIVE NEGATIVE    Bilirubin Urine NEGATIVE NEGATIVE    Ketones, Urine NEGATIVE NEGATIVE    Specific Gravity, UA 1.009 1.005 - 1.030    Urine Hgb NEGATIVE NEGATIVE    pH, UA 8.0 5.0 - 8.0    Protein, UA NEGATIVE NEGATIVE    Urobilinogen, Urine Normal Normal    Nitrite, Urine NEGATIVE NEGATIVE    Leukocyte Esterase, Urine NEGATIVE NEGATIVE    -          WBC, UA None 0 - 5 /HPF    RBC, UA None 0 - 4 /HPF    Casts UA NOT REPORTED 0 - 8 /LPF    Crystals, UA NOT REPORTED None /HPF    Epithelial Cells UA None 0 - 5 /HPF    Renal Epithelial, UA NOT REPORTED 0 /HPF    Bacteria, UA NOT REPORTED None    Mucus, UA NOT REPORTED None    Trichomonas, UA NOT REPORTED None    Amorphous, UA NOT REPORTED None    Other Observations UA NOT REPORTED NOT REQ. Yeast, UA NOT REPORTED None   Osmolality, Urine    Collection Time: 10/12/21  4:15 PM   Result Value Ref Range    Osmolality, Ur 301 80 - 1300 mOsm/kg   Sodium, urine, random    Collection Time: 10/12/21  4:15 PM   Result Value Ref Range    Sodium,Ur 87 mmol/L       Imaging/Diagnostics:  DEXA BONE DENSITY AXIAL SKELETON    Result Date: 10/8/2021  Osteoporosis by WHO criteria. Assessment :      Hospital Problems         Last Modified POA    * (Principal) Hyponatremia 10/12/2021 Yes    Macrocytic anemia 10/12/2021 Yes    Chronic pancreatitis ([facility] 10/12/2021 Yes          Plan:     Patient status inpatient in the Progressive Unit/Step down    Hyponatremia: Likely underlying SIADH. Previous urine sodium from 10/9 reviewed. Continue to trend sodium levels every 6 hours.

## 2021-10-12 NOTE — ED PROVIDER NOTES
[facility]     Emergency Department     Faculty Note/ Attestation      Pt Name: Saadia Encinas                                       MRN: [id number]  [place] 1970  Date of evaluation: 10/12/2021  Patients PCP:    Karyn Muhammad MD    Attestation  I performed a history and physical examination of the patient/ or directly observed  and discussed management with the resident. I reviewed the residents note and agree with the documented findings and plan of care. Any areas of disagreement are noted on the chart. I was personally present for the key portions of any procedures. I have documented in the chart those procedures where I was not present during the key portions. I have reviewed the emergency nurses triage note. I agree with the chief complaint, past medical history, past surgical history, allergies, medications, social and family history as documented unless otherwise noted below. For Physician Assistant/ Nurse Practitioner cases/documentation I have personally evaluated this patient and have completed at least one if not all key elements of the E/M (history, physical exam, and MDM). Additional findings are as noted. This patient was evaluated in the Emergency Department for symptoms described in the history of present illness. The patient was evaluated in the context of the global COVID-19 pandemic, which necessitated consideration that the patient might be at risk for infection with the SARS-CoV-2 virus that causes COVID-19. Institutional protocols and algorithms that pertain to the evaluation of patients at risk for COVID-19 are in a state of rapid change based on information released by regulatory bodies including the CDC and federal and state organizations. These policies and algorithms were followed during the patient's care in the ED.      Initial Screens:             Vitals:    Vitals:    10/12/21 1042   BP: (!) 131/91   Pulse: 95   Resp: 20   Temp: 98.1 °F (36.7 °C)   SpO2: 98%       Chief Complaint      Chief Complaint   Patient presents with    Other     na 123          temperature is 98.1 °F (36.7 °C). Her blood pressure is 131/91 (abnormal) and her pulse is 95. Her respiration is 20 and oxygen saturation is 98%.             DIAGNOSTIC RESULTS       RADIOLOGY:   No orders to display         LABS:  Labs Reviewed   CBC WITH AUTO DIFFERENTIAL - Abnormal; Notable for the following components:       Result Value    WBC 13.5 (*)     Platelets 375 (*)     Segs Absolute 8.66 (*)     All other components within normal limits   SPECIMEN REJECTION   SPECIMEN REJECTION   URINALYSIS WITH MICROSCOPIC   SODIUM, URINE, RANDOM   OSMOLALITY, URINE   SODIUM   OSMOLALITY   BASIC METABOLIC PANEL   MAGNESIUM   PREVIOUS SPECIMEN         EMERGENCY DEPARTMENT COURSE:     -------------------------      BP: (!) 131/91, Temp: 98.1 °F (36.7 °C), Pulse: 95, Resp: 20    System Problem List     Patient Active Problem List   Diagnosis    Acute bronchitis    Reflex sympathetic dystrophy of lower limb    Essential hypertension    Nicotine addiction    Psychophysiological insomnia    Anxiety    Alcoholic peripheral neuropathy (HCC)    Hypokalemia    Macrocytic anemia    Hypomagnesemia    Tobacco use    Ambulatory dysfunction    Seizure disorder (HCC)    COPD with acute exacerbation (HCC)    Paresthesia of lower extremity    Acute respiratory failure with hypoxia (HCC)    Pancreatic cyst    Recurrent major depressive disorder (HCC)    Elevated CA 19-9 level    Perineal mass, female    Esophagitis candidal     Condyloma    Astrocytoma (HCC) - 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24, the patient underwent 2 surgical resections without known recurrence    Alcohol use disorder, severe, in early remission (HCC)    Metabolic encephalopathy    AMAN (generalized anxiety disorder)    Major depressive disorder, recurrent severe without psychotic features ([facility] [address]    Esophageal dysphagia    Chronic peripheral neuropathic pain    History of alcohol abuse    History of petit-mal seizures    Intractable episodic tension-type headache    Personal history of astrocytoma    Multilevel spine pain    Chronic pain syndrome    Marijuana abuse    Severe sepsis (HCC)    Closed fracture of fifth thoracic vertebra (HCC)    Diverticulitis of large intestine with abscess without bleeding    Elevated brain natriuretic peptide (BNP) level    Elevated alkaline phosphatase level    Chronic pancreatitis (HCC)    Erythema ab igne    Compression fracture of thoracic vertebra (HCC)    Pathological compression fracture of lumbar vertebra with delayed healing    Metabolic acidosis with increased anion gap and accumulation of organic acids    Community acquired pneumonia of left lower lobe of lung    Septicemia ([address]    Alcohol-induced acute pancreatitis    Fluid collection of pancreas    Diverticulitis of large intestine without perforation or abscess with bleeding    Pseudocyst of pancreas    Bandemia    Sepsis ([address]    Acute recurrent pancreatitis    Atelectasis of left lung         Comments  Chronic Prob List noted          Jamie Figueroa MD,, MD, F.A.C.E.P.   Attending Emergency Physician         Jamie Figueroa MD  10/12/21 7554

## 2021-10-12 NOTE — ED PROVIDER NOTES
[facility] ED  Emergency Department Encounter  EmergencyMedicine Resident     Pt Eligio Rodgers  MRN: [id number]  [place] 1970  Date of evaluation: 10/12/21  PCP:  Tony Rivera MD    [address] [place] [address]       Chief Complaint   Patient presents with    Other     na 123       HISTORY OF PRESENT ILLNESS  (Location/Symptom, Timing/Onset, Context/Setting, Quality, Duration, Modifying Factors, Severity.)      Vinod Borja is a 45 y.o. female who presents with chief complaint of hyponatremia. 1 month ago, patient had a sodium of 139 and was recently getting preop labs for clearance and was found to be hyponatremic. Patient sodium was 123 yesterday, and the nephrologist who is going to follow with her recommended that she come to the emergency department for evaluation as to the etiology of her hyponatremia. Patient states she has back pain, which is chronic, no urinary frequency, urgency, or dysuria. She does not have any flank tenderness. She does state that she was a previous drinker but has not drank in several months. Prior to that, she was a daily drinker. Patient on multiple medications for generalized anxiety, major depressive disorder, COPD, chronic pancreatitis, among others as listed below. She does not know if she has previously had hyponatremia. Patient saw her PCP on 10/4 regarding preoperative clearance for performing anal condyloma excision with Dr. Corazon Lu. Patient also scheduled for ERCP in the near future.     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 [address] [facility] Tension headache, Under care of team, Under care of team, Under care of team, Under care of team, Under care of team, and Wellness examination. has a past surgical history that includes Hysterectomy (2003); Brain tumor excision (1989); fracture surgery (Left, 7-3-13); fracture surgery (Right); Upper gastrointestinal endoscopy (N/A, 10/22/2020); Colonoscopy (N/A, 10/22/2020); Endoscopic ultrasonography, GI (N/A, 12/9/2020); Upper gastrointestinal endoscopy (N/A, 4/5/2021); Hand surgery; CT ABSCESS DRAINAGE (7/28/2021); ERCP (08/11/2021); ERCP (N/A, 8/11/2021); ERCP (8/11/2021); Upper gastrointestinal endoscopy (N/A, 9/8/2021); and Spine surgery (N/A, 9/10/2021).       Social History     Socioeconomic History    Marital status: Single     Spouse name: Not on file    Number of children: Not on file    Years of education: Not on file    Highest education level: Not on file   Occupational History    Not on file   Tobacco Use    Smoking status: Current Every Day Smoker     Packs/day: 0.50     Years: 30.00     Pack years: 15.00     Types: Cigarettes    Smokeless tobacco: Never Used    Tobacco comment: plans on quitting in the future    Vaping Use    Vaping Use: Never used   Substance and Sexual Activity    Alcohol use: Not Currently     Alcohol/week: 0.0 standard drinks    Drug use: Yes     Frequency: 2.0 times per week     Types: Marijuana     Comment: last time 09/01/21    Sexual activity: Not Currently   Other Topics Concern    Not on file   Social History Narrative    Not on file     Social Determinants of Health     Financial Resource Strain: Medium Risk    Difficulty of Paying Living Expenses: Somewhat hard   Food Insecurity: No Food Insecurity    Worried About Running Out of Food in the Last Year: Never true    [place] of Food in the Last Year: Never true   Transportation Needs:     Lack of Transportation (Medical):  Lack of Transportation (Non-Medical):    Physical Activity:     Days of Exercise per Week:     Minutes of Exercise per Session:    Stress:     Feeling of Stress :    Social Connections:     Frequency of Communication with Friends and Family:     Frequency of Social Gatherings with Friends and Family:     Attends [employer] Services:     Active Member of Clubs or Organizations:     Attends Club or Organization Meetings:     Marital Status:    Intimate Partner Violence:     Fear of Current or Ex-Partner:     Emotionally Abused:     Physically Abused:     Sexually Abused:        Family History   Problem Relation Age of Onset    Other Father     Cancer Maternal Grandmother     Heart Disease Paternal Grandmother     Esophageal Cancer Maternal Aunt     Arthritis Mother        Allergies:  Patient has no known allergies. Home Medications:  Prior to Admission medications    Medication Sig Start Date End Date Taking? Authorizing Provider   pregabalin (LYRICA) 200 MG capsule Take 1 capsule by mouth 3 times daily for 180 days.  10/9/21 4/7/22  Glenroy Calvert MD   acamprosate (CAMPRAL) 333 MG tablet TAKE TWO TABLETS BY MOUTH THREE TIMES A DAY 10/7/21   LOI More NP   carBAMazepine (TEGRETOL) 200 MG tablet TAKE ONE TABLET BY MOUTH THREE TIMES A DAY 10/6/21   Ludivina Dhillon MD   topiramate (TOPAMAX) 50 MG tablet TAKE ONE TABLET BY MOUTH TWICE A DAY 10/6/21   Ludivina Dhillon MD   busPIRone (BUSPAR) 30 MG tablet TAKE ONE TABLET BY MOUTH TWICE A DAY WITH A MEAL 10/6/21   LOI More NP   spironolactone (ALDACTONE) 50 MG tablet TAKE ONE TABLET BY MOUTH DAILY 10/6/21   Ludivina Dhillon MD   amLODIPine (NORVASC) 5 MG tablet TAKE ONE TABLET BY MOUTH DAILY 10/6/21   Ludivina Dhillon MD   escitalopram (LEXAPRO) 5 MG tablet TAKE ONE TABLET BY MOUTH DAILY 10/6/21   LOI More NP-JENAE M20 20 MEQ extended release tablet TAKE ONE TABLET BY MOUTH DAILY 10/6/21   Ludivina Day MD   amoxicillin (AMOXIL) 500 MG capsule  9/30/21   Bryson Wilkerson, SRIKANTHS   chlorhexidine (PERIDEX) 0.12 % solution  9/30/21   Cozenick Miracle, DDS   traMADol [place]) 50 MG tablet  9/30/21   Genevieve Azar DDS   sodium chloride 1 g tablet Take 1 tablet by mouth 4 times daily 10/4/21   Ludivina Day MD   zoster recombinant adjuvanted vaccine [facility]) 50 MCG/0.5ML SUSR injection Inject 0.5 mLs into the muscle See Admin Instructions 1 dose now and repeat in 2-6 months 10/4/21 4/2/22  Ludivina Day MD   DULoxetine (CYMBALTA) 60 MG extended release capsule Take 60 mg by mouth daily  9/7/21   Historical Provider, MD   simethicone (MYLICON) 80 MG chewable tablet Take 1 tablet by mouth 4 times daily as needed for Flatulence 8/27/21   Etienne Danielle MD   hydrOXYzine (ATARAX) 25 MG tablet Take 1 tablet by mouth 3 times daily as needed for Anxiety 8/25/21   LOI Peter NP   metoclopramide (REGLAN) 5 MG tablet Take 1 tablet by mouth 3 times daily 8/19/21   Etienne Danielle MD   omeprazole (PRILOSEC) 40 MG delayed release capsule Take 1 capsule by mouth 2 times daily 8/19/21   Etienne Danielle MD   lipase-protease-amylase (CREON) 69380-60045 units delayed release capsule Take 1 capsule by mouth 3 times daily (with meals) 8/17/21   Ludivina Day MD   traZODone (DESYREL) 50 MG tablet Take 1 tablet by mouth nightly as needed for Sleep 8/5/21 10/4/21  LOI Peter NP   baclofen (LIORESAL) 10 MG tablet Take 1 tablet by mouth 3 times daily 5/25/21   Ludivina Day MD   ferrous sulfate (IRON 325) 325 (65 Fe) MG tablet Take 1 tablet by mouth 2 times daily 4/22/21   Ludivina Day MD   rOPINIRole (REQUIP) 1 MG tablet Take 1 tablet by mouth nightly 4/1/21   Ludivina Day MD   budesonide-formoterol [facility]) 160-4.5 MCG/ACT AERO Inhale 2 puffs into the lungs 2 times daily 3/31/21   Maude Lozano MD tiotropium (SPIRIVA RESPIMAT) 2.5 MCG/ACT AERS inhaler Inhale 2 puffs into the lungs daily 2/26/21 10/4/21  Truman Duncan MD   albuterol sulfate HFA (VENTOLIN HFA) 108 (90 Base) MCG/ACT inhaler Inhale 2 puffs into the lungs 4 times daily as needed for Wheezing 6/11/20   Ludivina Crowell MD   vitamin B-1 (THIAMINE) 100 MG tablet Take 1 tablet by mouth daily 7/12/19   Ludivina Crowell MD   vitamin D (ERGOCALCIFEROL) 19376 units CAPS capsule Take 1 capsule by mouth once a week 6/19/19   Ludivina Crowell MD   folic acid (FOLVITE) 1 MG tablet Take 1 tablet by mouth daily 6/19/19   Ludivina Crowell MD       REVIEW OF SYSTEMS    (2-9 systems for level 4, 10 or more for level 5)      Review of Systems   Constitutional: Negative for chills, diaphoresis, fatigue and fever. Respiratory: Negative for cough, chest tightness, shortness of breath and wheezing. Cardiovascular: Negative for chest pain, palpitations and leg swelling. Gastrointestinal: Negative for abdominal pain, constipation, diarrhea, nausea and vomiting. Endocrine: Negative for polydipsia, polyphagia and polyuria. Genitourinary: Negative for difficulty urinating, dysuria, frequency and urgency. Musculoskeletal: Positive for back pain (lower back, chronic). Negative for arthralgias, neck pain and neck stiffness. Skin: Negative for color change, pallor and rash. Neurological: Positive for headaches (feels like her migraines). Negative for dizziness, weakness and light-headedness. PHYSICAL EXAM   (up to 7 for level 4, 8 or more for level 5)      INITIAL VITALS:   BP (!) 131/91   Pulse 95   Temp 98.1 °F (36.7 °C)   Resp 20   SpO2 98%     Physical Exam  Vitals and nursing note reviewed. Constitutional:       General: She is not in acute distress. Appearance: She is well-developed. She is not diaphoretic. HENT:      Head: Normocephalic and atraumatic. Nose: Nose normal. No congestion or rhinorrhea.    Eyes:      General: No scleral icterus. Conjunctiva/sclera: Conjunctivae normal.      Pupils: Pupils are equal, round, and reactive to light. Neck:      Vascular: No JVD. Trachea: No tracheal deviation. Cardiovascular:      Rate and Rhythm: Normal rate and regular rhythm. Heart sounds: Normal heart sounds. No murmur heard. No friction rub. Pulmonary:      Effort: Pulmonary effort is normal. No respiratory distress. Breath sounds: Normal breath sounds. No wheezing. Chest:      Chest wall: No tenderness. Abdominal:      General: Bowel sounds are normal. There is no distension. Palpations: Abdomen is soft. Tenderness: There is no abdominal tenderness. There is no right CVA tenderness, left CVA tenderness or guarding. Musculoskeletal:         General: Tenderness (Bilateral lower back) present. Cervical back: Normal range of motion and neck supple. Skin:     General: Skin is warm and dry. Capillary Refill: Capillary refill takes less than 2 seconds. Coloration: Skin is not pale. Findings: No erythema. Neurological:      General: No focal deficit present. Mental Status: She is alert and oriented to person, place, and time. Cranial Nerves: No cranial nerve deficit. Sensory: No sensory deficit. Motor: No weakness.    Psychiatric:         Mood and Affect: Mood normal.         Behavior: Behavior normal.         DIFFERENTIAL  DIAGNOSIS     PLAN (LABS / IMAGING / EKG):  Orders Placed This Encounter   Procedures    CBC WITH AUTO DIFFERENTIAL    Urinalysis with microscopic    SPECIMEN REJECTION    SODIUM    OSMOLALITY    SPECIMEN REJECTION    Basic Metabolic Panel    Magnesium    PREVIOUS SPECIMEN    Osmolality, Urine    Sodium, urine, random    Inpatient consult to Nephrology    Inpatient consult to Hospitalist       MEDICATIONS ORDERED:  Orders Placed This Encounter   Medications    prochlorperazine (COMPAZINE) injection 5 mg    diphenhydrAMINE (BENADRYL) injection 25 mg    acetaminophen (TYLENOL) tablet 1,000 mg       DDX: Hyponatremia, medication side effect, migraine    MDM/IMPRESSION: This is a 58-year-old female presenting with chief complaint of low sodium and outpatient labs. Patient was found to have sodium of 123 yesterday, and is going to be referred to nephrology but the nephrologist recommend she come to the emergency department for further evaluation. Will obtain labs, discuss with nephrology. Admission. DIAGNOSTIC RESULTS / EMERGENCY DEPARTMENT COURSE / MDM   LAB RESULTS:  Results for orders placed or performed during the hospital encounter of 10/12/21   CBC WITH AUTO DIFFERENTIAL   Result Value Ref Range    WBC 13.5 (H) 3.5 - 11.3 k/uL    RBC 4.49 3.95 - 5.11 m/uL    Hemoglobin 13.0 11.9 - 15.1 g/dL    Hematocrit 39.9 36.3 - 47.1 %    MCV 88.9 82.6 - 102.9 fL    MCH 29.0 25.2 - 33.5 pg    MCHC 32.6 28.4 - 34.8 g/dL    RDW 13.2 11.8 - 14.4 %    Platelets 215 (H) 129 - 453 k/uL    MPV 9.9 8.1 - 13.5 fL    NRBC Automated 0.0 0.0 per 100 WBC    Differential Type NOT REPORTED     Seg Neutrophils 65 36 - 65 %    Lymphocytes 27 24 - 43 %    Monocytes 7 3 - 12 %    Eosinophils % 1 1 - 4 %    Basophils 0 0 - 2 %    Immature Granulocytes 0 0 %    Segs Absolute 8.66 (H) 1.50 - 8.10 k/uL    Absolute Lymph # 3.69 1.10 - 3.70 k/uL    Absolute Mono # 0.93 0.10 - 1.20 k/uL    Absolute Eos # 0.08 0.00 - 0.44 k/uL    Basophils Absolute 0.03 0.00 - 0.20 k/uL    Absolute Immature Granulocyte 0.06 0.00 - 0.30 k/uL    WBC Morphology NOT REPORTED     RBC Morphology NOT REPORTED     Platelet Estimate NOT REPORTED    SPECIMEN REJECTION   Result Value Ref Range    Specimen Source . BLOOD     Ordered Test BMP, MG     Reason for Rejection Unable to perform testing: Specimen hemolyzed. - NOT REPORTED    SPECIMEN REJECTION   Result Value Ref Range    Specimen Source . BLOOD     Ordered Test  BMP MG     Reason for Rejection Unable to perform testing: Specimen hemolyzed. - NOT REPORTED          RADIOLOGY:  No orders to display        EKG      All EKG's are interpreted by the Emergency Department Physician who either signs or Co-signs this chart in the absence of a cardiologist.    EMERGENCY DEPARTMENT COURSE:  ED Course as of Oct 12 1642   Tue Oct 12, 2021   1545 Discussed patient with [facility], accepted for admission. [JG]   1553 SODIUM [RK]      ED Course User Index  [JG] Trinh Cotton DO  [RK] Zach Escobar MD        PROCEDURES:      CONSULTS:  IP CONSULT TO NEPHROLOGY  IP CONSULT TO HOSPITALIST    CRITICAL CARE:      FINAL IMPRESSION      1. Hyponatremia          DISPOSITION / PLAN     DISPOSITION Decision To Admit 10/12/2021 03:32:47 PM      PATIENT REFERRED TO:  No follow-up provider specified.     DISCHARGE MEDICATIONS:  New Prescriptions    No medications on file       Trinh Cotton DO  Emergency Medicine Resident    (Please note that portions of thisnote were completed with a voice recognition program.  Efforts were made to edit the dictations but occasionally words are mis-transcribed.)     Trinh Cotton DO  10/12/21 [id number]

## 2021-10-12 NOTE — ED PROVIDER NOTES
[address] HANDOFF       Handoff taken on the following patient from prior Attending Physician:  Pt Name: Bebeto Land  PCP:  Zhane Blount MD    [address]  I was available and discussed any additional care issues that arose and coordinated the management plans with the resident(s) caring for the patient during my duty period. Any areas of disagreement with resident's documentation of care or procedures are noted on the chart. I was personally present for the key portions of any/all procedures during my duty period. I have documented in the chart those procedures where I was not present during the key portions.          CHIEF COMPLAINT       Chief Complaint   Patient presents with    Other     na 123         CURRENT MEDICATIONS     Previous Medications  Previous Medications    ACAMPROSATE (CAMPRAL) 333 MG TABLET    TAKE TWO TABLETS BY MOUTH THREE TIMES A DAY    ALBUTEROL SULFATE HFA (VENTOLIN HFA) 108 (90 BASE) MCG/ACT INHALER    Inhale 2 puffs into the lungs 4 times daily as needed for Wheezing    AMLODIPINE (NORVASC) 5 MG TABLET    TAKE ONE TABLET BY MOUTH DAILY    AMOXICILLIN (AMOXIL) 500 MG CAPSULE        BACLOFEN (LIORESAL) 10 MG TABLET    Take 1 tablet by mouth 3 times daily    BUDESONIDE-FORMOTEROL (SYMBICORT) 160-4.5 MCG/ACT AERO    Inhale 2 puffs into the lungs 2 times daily    BUSPIRONE (BUSPAR) 30 MG TABLET    TAKE ONE TABLET BY MOUTH TWICE A DAY WITH A MEAL    CARBAMAZEPINE (TEGRETOL) 200 MG TABLET    TAKE ONE TABLET BY MOUTH THREE TIMES A DAY    CHLORHEXIDINE (PERIDEX) 0.12 % SOLUTION        DULOXETINE (CYMBALTA) 60 MG EXTENDED RELEASE CAPSULE    Take 60 mg by mouth daily     ESCITALOPRAM (LEXAPRO) 5 MG TABLET    TAKE ONE TABLET BY MOUTH DAILY    FERROUS SULFATE (IRON 325) 325 (65 FE) MG TABLET    Take 1 tablet by mouth 2 times daily    FOLIC ACID (FOLVITE) 1 MG TABLET    Take 1 tablet by mouth daily    HYDROXYZINE (ATARAX) 25 MG TABLET    Take 1 tablet by mouth 3 times daily as needed for Anxiety    KLOR-CON M20 20 MEQ EXTENDED RELEASE TABLET    TAKE ONE TABLET BY MOUTH DAILY    LIPASE-PROTEASE-AMYLASE (CREON) 11084-59803 UNITS DELAYED RELEASE CAPSULE    Take 1 capsule by mouth 3 times daily (with meals)    METOCLOPRAMIDE (REGLAN) 5 MG TABLET    Take 1 tablet by mouth 3 times daily    OMEPRAZOLE (PRILOSEC) 40 MG DELAYED RELEASE CAPSULE    Take 1 capsule by mouth 2 times daily    PREGABALIN (LYRICA) 200 MG CAPSULE    Take 1 capsule by mouth 3 times daily for 180 days. ROPINIROLE (REQUIP) 1 MG TABLET    Take 1 tablet by mouth nightly    SIMETHICONE (MYLICON) 80 MG CHEWABLE TABLET    Take 1 tablet by mouth 4 times daily as needed for Flatulence    SODIUM CHLORIDE 1 G TABLET    Take 1 tablet by mouth 4 times daily    SPIRONOLACTONE (ALDACTONE) 50 MG TABLET    TAKE ONE TABLET BY MOUTH DAILY    TIOTROPIUM (SPIRIVA RESPIMAT) 2.5 MCG/ACT AERS INHALER    Inhale 2 puffs into the lungs daily    TOPIRAMATE (TOPAMAX) 50 MG TABLET    TAKE ONE TABLET BY MOUTH TWICE A DAY    TRAMADOL (ULTRAM) 50 MG TABLET        TRAZODONE (DESYREL) 50 MG TABLET    Take 1 tablet by mouth nightly as needed for Sleep    VITAMIN B-1 (THIAMINE) 100 MG TABLET    Take 1 tablet by mouth daily    VITAMIN D (ERGOCALCIFEROL) 06401 UNITS CAPS CAPSULE    Take 1 capsule by mouth once a week    ZOSTER RECOMBINANT ADJUVANTED VACCINE (SHINGRIX) 50 MCG/0.5ML SUSR INJECTION    Inject 0.5 mLs into the muscle See Admin Instructions 1 dose now and repeat in 2-6 months       Encounter Medications  Orders Placed This Encounter   Medications    prochlorperazine (COMPAZINE) injection 5 mg    diphenhydrAMINE (BENADRYL) injection 25 mg    acetaminophen (TYLENOL) tablet 1,000 mg       ALLERGIES     has No Known Allergies.       RECENT VITALS:   Temp: 98.1 °F (36.7 °C),  Pulse: 95, Resp: 20, BP: (!) 131/91    RADIOLOGY:   No orders to display       LABS:  Labs Reviewed   CBC WITH AUTO DIFFERENTIAL - Abnormal; Notable for the following components:       Result Value    WBC 13.5 (*)     Platelets 872 (*)     Segs Absolute 8.66 (*)     All other components within normal limits   OSMOLALITY - Abnormal; Notable for the following components:    Serum Osmolality 261 (*)     All other components within normal limits   BASIC METABOLIC PANEL - Abnormal; Notable for the following components:    Glucose 104 (*)     BUN 4 (*)     CREATININE 0.31 (*)     Sodium 126 (*)     Chloride 92 (*)     All other components within normal limits   HEPATIC FUNCTION PANEL - Abnormal; Notable for the following components:    Alkaline Phosphatase 211 (*)     Total Bilirubin 0.19 (*)     All other components within normal limits   URINALYSIS WITH MICROSCOPIC   SPECIMEN REJECTION   SPECIMEN REJECTION   MAGNESIUM   OSMOLALITY, URINE   SODIUM, URINE, RANDOM   ETHANOL   PREVIOUS SPECIMEN     Headache, abdominal pain, diarrhea, hyponatremia      PLAN/ TASKS OUTSTANDING     Symptom management, labs, reassess, admission    (Please note that portions of this note were completed with a voice recognition program.  Efforts were made to edit the dictations but occasionally words are mis-transcribed. )    Indy Aparicio MD,, MD, F.A.C.E.P.   Attending Emergency Physician       Indy Aparicio MD  10/12/21 [id number]

## 2021-10-14 NOTE — PSYCHOTHERAPY
[facility] Therapy Note in [facility], [address]   10/11/21  2:00 PM  Pradip Clarke  1970  [id number]    Time spent with Patient: 60 minutes      Pt was provided informed consent for the [facility]. Discussed with patient model of service to include the limits of confidentiality (i.e. abuse reporting, suicide intervention, etc.) and short-term intervention focused approach. Pt indicated understanding. S:  Pt met with therapist for session at the [facility]. Therapist invited pt to discuss and process her week. Pt reports she has been doing her best to stay calm, but that it has been a hard week. Therapist and pt reviewed pt's coping skills. Therapist offered psychoeducation on behavioral activation and reviewed it with pt. Pt and therapist discussed activities that pt enjoys and tasks that pt wants to get done. Pt identified that she wants to do her dishes right after she uses them and would like to shower more often. Therapist and pt engaged in [address] [address] to explore pt's thoughts about herself and the world. Pt reports that she doesn't care enough to shower or keep her house clean but wishes she did. Pt and therapist explored pt's views of herself including the core belief that her F taught her that she was [place] piece of shit who will never amount to anything. \"  Pt still struggles with those thoughts and therapist offered emotional support to pt to help her express her emotions. Therapist gave pt space to express her emotions and Pt and therapist created a behavioral activation plan and identified how easy and how much pleasure it would bring her. Therapist empowered pt to be able to complete her tasks and assigned homework of completing behavioral activation log.           O:  MSE:     Appearance    alert, cooperative, crying  Appetite normal  Sleep disturbance No  Fatigue No  Loss of pleasure Yes  Impulsive behavior No  Speech    normal rate, normal volume and well articulated  Mood    Depressed  Worthless  Low self-esteem  Affect    depressed affect  Thought Content    hopelessness and helplessness  Thought Process    linear, goal directed and coherent  Associations    logical connections  Insight    Good  Judgment    Intact  Orientation    oriented to person, place, time, and general circumstances  Memory    recent and remote memory intact  Attention/Concentration    intact  Morbid ideation No  Suicide Assessment    no suicidal ideation    A:  Pt presents to therapy session in a slightly depressed mood and with low self-worth. Pt was engaged in session and reports that she didn't want to come up with a topic for therapy and preferred if therapist led the discussion. Pt displayed an understanding of behavioral activation and was engaged in session and discussion of positive activities she wants to integrate into her routine and tasks she wants to complete. Pt has a negative core belief of worthlessness that she developed after hearing her F tell her negative things about herself. Pt is also struggling with the reality that she both loves and hates her F. Pt will benefit from continuing to explore negative thoughts and engage in CBT to reframe them.          Visit Diagnosis:   PTSD w/dissiciation      History from Medical Record:        Diagnosis Date    Acute respiratory failure with hypoxia ([facility] 10/16/2020    Alcohol withdrawal syndrome, with delirium ([address] 12/14/2019    Alcoholism ([address]     Anemia 10/2020    GI bleed    Anxiety     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24, the patient underwent 2 surgical resections without known recurrence 10/23/2020    Closed fracture of lateral portion of left tibial plateau 29/65/8915    COPD (chronic obstructive pulmonary disease) ([address]     CO2 retainer, on Bipap at night for this, Dr. Jose C Arrieta ( last visit 11/20/2020 and note on chart )    Depression     bipolar, major depressive disorder, ptsd, anxiety    Dysphagia  GI bleed 10/2020    Hypertension     Memory loss     Oxygen dependent     Pain, joint, ankle and foot     Pancreatic lesion 10/2020    Dr. Emmanuel Watkins working up pt    Peripheral neuropathy     Seizures ([address]     also baseline tremors-last sz summer 2020    Tension headache     Under care of team 09/29/2021    neuro-Dr Coyle-[facility]-last visit sep 2021    Under care of team 09/29/2021    pain management-Hamzah Martines-nery jimenez-last visit sep 2021    Under care of team 09/29/2021    pulmonology-Dr Due[facility]as[facility]-due for visit oct 26/2021    Under care of team 09/29/2021    psych-bahnfeldt NP-telemed-last visit may 2021    Under care of team 09/29/2021    cv-[address]-last visit aug 2021    Wellness examination 09/29/2021    pcp-Ludivina grimes-last visit june 2021     Medications:   Current Outpatient Medications   Medication Sig Dispense Refill    pregabalin (LYRICA) 200 MG capsule Take 1 capsule by mouth 3 times daily for 180 days.  90 capsule 5    acamprosate (CAMPRAL) 333 MG tablet TAKE TWO TABLETS BY MOUTH THREE TIMES A  tablet 0    carBAMazepine (TEGRETOL) 200 MG tablet TAKE ONE TABLET BY MOUTH THREE TIMES A DAY 90 tablet 0    topiramate (TOPAMAX) 50 MG tablet TAKE ONE TABLET BY MOUTH TWICE A  tablet 1    busPIRone (BUSPAR) 30 MG tablet TAKE ONE TABLET BY MOUTH TWICE A DAY WITH A MEAL 60 tablet 0    spironolactone (ALDACTONE) 50 MG tablet TAKE ONE TABLET BY MOUTH DAILY 90 tablet 1    amLODIPine (NORVASC) 5 MG tablet TAKE ONE TABLET BY MOUTH DAILY 90 tablet 1    escitalopram (LEXAPRO) 5 MG tablet TAKE ONE TABLET BY MOUTH DAILY 30 tablet 1    KLOR-CON M20 20 MEQ extended release tablet TAKE ONE TABLET BY MOUTH DAILY 90 tablet 1    amoxicillin (AMOXIL) 500 MG capsule  (Patient not taking: Reported on 10/4/2021)      chlorhexidine (PERIDEX) 0.12 % solution       traMADol (ULTRAM) 50 MG tablet  (Patient not taking: Reported on 10/4/2021)      sodium chloride 1 g tablet Take 1 tablet by mouth 4 times daily 120 tablet 3    zoster recombinant adjuvanted vaccine (SHINGRIX) 50 MCG/0.5ML SUSR injection Inject 0.5 mLs into the muscle See Admin Instructions 1 dose now and repeat in 2-6 months 0.5 mL 0    DULoxetine (CYMBALTA) 60 MG extended release capsule Take 60 mg by mouth daily       simethicone (MYLICON) 80 MG chewable tablet Take 1 tablet by mouth 4 times daily as needed for Flatulence 180 tablet 3    hydrOXYzine (ATARAX) 25 MG tablet Take 1 tablet by mouth 3 times daily as needed for Anxiety 90 tablet 3    metoclopramide (REGLAN) 5 MG tablet Take 1 tablet by mouth 3 times daily 120 tablet 2    omeprazole (PRILOSEC) 40 MG delayed release capsule Take 1 capsule by mouth 2 times daily 60 capsule 2    lipase-protease-amylase (CREON) 44425-99551 units delayed release capsule Take 1 capsule by mouth 3 times daily (with meals) 90 capsule 1    traZODone (DESYREL) 50 MG tablet Take 1 tablet by mouth nightly as needed for Sleep 30 tablet 0    baclofen (LIORESAL) 10 MG tablet Take 1 tablet by mouth 3 times daily 60 tablet 1    ferrous sulfate (IRON 325) 325 (65 Fe) MG tablet Take 1 tablet by mouth 2 times daily 180 tablet 1    rOPINIRole (REQUIP) 1 MG tablet Take 1 tablet by mouth nightly 90 tablet 1    budesonide-formoterol (SYMBICORT) 160-4.5 MCG/ACT AERO Inhale 2 puffs into the lungs 2 times daily 3 Inhaler 1    tiotropium (SPIRIVA RESPIMAT) 2.5 MCG/ACT AERS inhaler Inhale 2 puffs into the lungs daily 1 Inhaler 11    albuterol sulfate HFA (VENTOLIN HFA) 108 (90 Base) MCG/ACT inhaler Inhale 2 puffs into the lungs 4 times daily as needed for Wheezing 1 Inhaler 5    vitamin B-1 (THIAMINE) 100 MG tablet Take 1 tablet by mouth daily 30 tablet 3    vitamin D (ERGOCALCIFEROL) 54513 units CAPS capsule Take 1 capsule by mouth once a week 12 capsule 1    folic acid (FOLVITE) 1 MG tablet Take 1 tablet by mouth daily 90 tablet 1     No current facility-administered medications for this encounter. Social History:   Social History     Socioeconomic History    Marital status: Single     Spouse name: Not on file    Number of children: Not on file    Years of education: Not on file    Highest education level: Not on file   Occupational History    Not on file   Tobacco Use    Smoking status: Current Every Day Smoker     Packs/day: 0.50     Years: 30.00     Pack years: 15.00     Types: Cigarettes    Smokeless tobacco: Never Used    Tobacco comment: plans on quitting in the future    Vaping Use    Vaping Use: Never used   Substance and Sexual Activity    Alcohol use: Not Currently     Alcohol/week: 0.0 standard drinks    Drug use: Yes     Frequency: 2.0 times per week     Types: Marijuana     Comment: last time 09/01/21    Sexual activity: Not Currently   Other Topics Concern    Not on file   Social History Narrative    Not on file     Social Determinants of Health     Financial Resource Strain: Medium Risk    Difficulty of Paying Living Expenses: Somewhat hard   Food Insecurity: No Food Insecurity    Worried About Running Out of Food in the Last Year: Never true    [place] of Food in the Last Year: Never true   Transportation Needs:     Lack of Transportation (Medical):  Lack of Transportation (Non-Medical):    Physical Activity:     Days of Exercise per Week:     Minutes of Exercise per Session:    Stress:     Feeling of Stress :    Social Connections:     Frequency of Communication with Friends and Family:     Frequency of Social Gatherings with Friends and Family:     Attends Zoroastrian Services:     Active Member of Clubs or Organizations:     Attends Club or Organization Meetings:     Marital Status:    Intimate Partner Violence:     Fear of Current or Ex-Partner:     Emotionally Abused:     Physically Abused:     Sexually Abused:        TOBACCO:   reports that she has been smoking cigarettes.  She has a 15.00 pack-year smoking history. She has never used smokeless tobacco.  ETOH:   reports previous alcohol use. Family History:   Family History   Problem Relation Age of Onset    Other Father     Cancer Maternal Grandmother     Heart Disease Paternal Grandmother     Esophageal Cancer Maternal Aunt     Arthritis Mother            Pt interventions:  Provided handout on  Behavioral activation, Trained in relaxation strategies, Discussed and problem-solved barriers in adhering to behavioral change plan, Motivational Interviewing to increase patient confidence and compliance with adhering to behavioral change plan, Established rapport, Supportive techniques, CBT to target negative thoughts  and Collaborative treatment planning,Clarified role of [facility] in primary care,Recommended that pt establish with a mental health clinician with whom they can meet regularly for psychotherapy services        PLAN:   Review behavioral activation  Explore negative thoughts   Cognitive distortions       Patient scheduled to return on:       Were changes or additions made to the treatment plan today?   YES []   NO [x]  Noted changes:

## 2021-10-14 NOTE — DISCHARGE SUMMARY
[facility]  Office: [address], DO, Jagdeep Galvez, DO, Shaquille Lozanoelin, DO, Samuel Burns Blood, DO, Germán Boyce MD, Nahum Brennan MD, Jun Thomas MD, Taryn Hooper MD, Michael Camarena MD, Jose Vidal MD, Ilana Waters MD, Alis Marley MD, Meg Krishnamurthy, DO, Stephanie Aldridge, DO, Mario Su MD,  Ursula Gerardo, DO, Jo Mihcael MD, Patsy Davenport MD, Keila Ritter MD, Deep Hoyos MD, Josiane Lindsay MD, Nahun Meyers MD, Val Shelley, [facility], [facility], CNP, Lev Johnson, CNP, Mariah Echeverria, CNS, Kimberley Go, CNP, Robina Mayberry, CNP, Kash Duque, CNP, Judy Stephenson, CNP, Ej Xie, CNP, Mary Sousa, CNP, Cammy Gunderson PA-C, Charmel Osgood, [facility], Debbei Pearce, CNP, Susan Marino, CNP, Estefani Kee, CNP, Katelyn Gerardo, CNP, Tiney Schilder, CNP, Marlyne Osgood, CNP, Del Jarrett, [facility]    Discharge Summary     Patient ID: Vinod Borja  :  1970   MRN: [id number]     ACCOUNT:  [de-identified]   Patient's PCP: Tony Rivera MD  Admit Date: 10/12/2021   Discharge Date: 10/14/2021     Length of Stay: 1  Code Status:  Prior  Admitting Physician: Fahad Bonilla DO  Discharge Physician: Charmel Osgood, APRN - NP     Active Discharge Diagnoses:     Hospital Problem Lists:  Principal Problem:    Hyponatremia  Active Problems:    Anxiety    Alcoholic peripheral neuropathy (HCC)    Macrocytic anemia    Condyloma    Chronic peripheral neuropathic pain    Chronic pain syndrome    Chronic pancreatitis (HCC)    Compression fracture of thoracic vertebra (HCC)    Iron deficiency anemia  Resolved Problems:    * No resolved hospital problems.  *      Admission Condition:  fair     Discharged Condition: fair    Hospital Stay:     Hospital Course:  Vinod Borja is a 45 y.o. female who was admitted for the management of   Hyponatremia , presented to ER with Other (na 123)    Barbara [place]NN induced hepatitis with portal hypertension: Continue Aldactone     Chronic  pancreatitis with pseudocyst: With pancreatic stent placement from previous admission. Continue Creon. Completed EUS on 9/8/2021 with GI services with previous concern for pancreatic lesion, biopsies negative. Consult GI     Seizure disorder: Continue Topamax, seizure precautions with hyponatremia     COPD with personal history of tobacco use: Tolerating room air, without exacerbation. Continue home inhalers     Iron deficiency anemia: Continue ferrous sulfate twice daily. Hemoglobin stable this admission     Condyloma: Patient following with colorectal surgery as outpatient. Patient was scheduled for condyloma excision on 9/21 but this was canceled     Depression/anxiety: On BuSpar, Lexapro     EtOH abuse:  On Campral, continue folic acid, MVI, thiamine     Chronic pain with chronic peripheral neuropathy: Follows up at pain clinic as outpatient     History of astrocytoma status post surgical resection       Significant Diagnostic Studies:   Labs / Micro:  CBC:   Lab Results   Component Value Date    WBC 13.5 10/12/2021    RBC 4.49 10/12/2021    RBC 4.16 04/30/2012    HGB 13.0 10/12/2021    HCT 39.9 10/12/2021    MCV 88.9 10/12/2021    MCH 29.0 10/12/2021    MCHC 32.6 10/12/2021    RDW 13.2 10/12/2021     10/12/2021     04/30/2012     BMP:    Lab Results   Component Value Date    GLUCOSE 104 10/12/2021    GLUCOSE 92 04/30/2012     10/12/2021    K 4.7 10/12/2021    CL 92 10/12/2021    CO2 21 10/12/2021    ANIONGAP 13 10/12/2021    BUN 4 10/12/2021    CREATININE 0.31 10/12/2021    BUNCRER NOT REPORTED 10/12/2021    CALCIUM 9.4 10/12/2021    LABGLOM >60 10/12/2021    GFRAA >60 10/12/2021    GFR      10/12/2021    GFR NOT REPORTED 10/12/2021     HFP:    Lab Results   Component Value Date    PROT 7.3 10/12/2021     CMP:    Lab Results   Component Value Date    GLUCOSE 104 10/12/2021    GLUCOSE 92 04/30/2012     10/12/2021    K 4.7 10/12/2021    CL 92 10/12/2021    CO2 21 10/12/2021    BUN 4 10/12/2021    CREATININE 0.31 10/12/2021    ANIONGAP 13 10/12/2021    ALKPHOS 211 10/12/2021    ALT 7 10/12/2021    AST 14 10/12/2021    BILITOT 0.19 10/12/2021    LABALBU 3.8 10/12/2021    ALBUMIN 1.1 10/12/2021    LABGLOM >60 10/12/2021    GFRAA >60 10/12/2021    GFR      10/12/2021    GFR NOT REPORTED 10/12/2021    PROT 7.3 10/12/2021    CALCIUM 9.4 10/12/2021     PT/INR:    Lab Results   Component Value Date    PROTIME 10.9 08/09/2021    INR 1.0 08/09/2021     PTT:   Lab Results   Component Value Date    APTT 24.6 08/09/2021     FLP:    Lab Results   Component Value Date    HDL 42 12/23/2020     U/A:    Lab Results   Component Value Date    COLORU Yellow 10/12/2021    TURBIDITY Clear 10/12/2021    SPECGRAV 1.009 10/12/2021    HGBUR NEGATIVE 10/12/2021    PHUR 8.0 10/12/2021    PROTEINU NEGATIVE 10/12/2021    GLUCOSEU NEGATIVE 10/12/2021    KETUA NEGATIVE 10/12/2021    BILIRUBINUR NEGATIVE 10/12/2021    BILIRUBINUR moderate 12/18/2018    UROBILINOGEN Normal 10/12/2021    NITRU NEGATIVE 10/12/2021    LEUKOCYTESUR NEGATIVE 10/12/2021     TSH:    Lab Results   Component Value Date    TSH 0.68 12/23/2020        Radiology:  DEXA BONE DENSITY AXIAL SKELETON    Result Date: 10/8/2021  Osteoporosis by WHO criteria. Consultations:    Consults:     Final Specialist Recommendations/Findings:   IP CONSULT TO NEPHROLOGY  IP CONSULT TO HOSPITALIST  IP CONSULT TO GI      The patient was seen and examined on day of discharge and this discharge summary is in conjunction with any daily progress note from day of discharge. Discharge plan:     Disposition: AMA    Physician Follow Up:     No follow-up provider specified.      Requiring Further Evaluation/Follow Up POST HOSPITALIZATION/Incidental Findings:     Diet: regular diet and low fat, low cholesterol diet    Activity: As tolerated    Instructions to Patient:     Discharge Medications: LYRICA  Take 1 capsule by mouth 3 times daily for 180 days. rOPINIRole 1 MG tablet  Commonly known as: REQUIP  Take 1 tablet by mouth nightly     Shingrix 50 MCG/0.5ML Susr injection  Generic drug: zoster recombinant adjuvanted vaccine  Inject 0.5 mLs into the muscle See Admin Instructions 1 dose now and repeat in 2-6 months     simethicone 80 MG chewable tablet  Commonly known as: MYLICON  Take 1 tablet by mouth 4 times daily as needed for Flatulence     sodium chloride 1 g tablet  Take 1 tablet by mouth 4 times daily     spironolactone 50 MG tablet  Commonly known as: ALDACTONE  TAKE ONE TABLET BY MOUTH DAILY     tiotropium 2.5 MCG/ACT Aers inhaler  Commonly known as: Spiriva Respimat  Inhale 2 puffs into the lungs daily     topiramate 50 MG tablet  Commonly known as: TOPAMAX  TAKE ONE TABLET BY MOUTH TWICE A DAY     traMADol 50 MG tablet  Commonly known as: ULTRAM     traZODone 50 MG tablet  Commonly known as: DESYREL  Take 1 tablet by mouth nightly as needed for Sleep     vitamin B-1 100 MG tablet  Commonly known as: THIAMINE  Take 1 tablet by mouth daily     vitamin D 1.25 MG (61485 UT) Caps capsule  Commonly known as: ERGOCALCIFEROL  Take 1 capsule by mouth once a week            No discharge procedures on file. Time Spent on discharge is  20 mins in patient examination, evaluation, counseling as well as medication reconciliation, prescriptions for required medications, discharge plan and follow up. Discussed with attending  Electronically signed by   LOI Shirley NP  10/14/2021  2:19 PM      Thank you Dr. Alonzo Teixeira MD for the opportunity to be involved in this patient's care.

## 2021-10-15 ENCOUNTER — HOSPITAL ENCOUNTER (OUTPATIENT)
Age: 51
Discharge: HOME | End: 2021-10-15
Payer: MEDICARE

## 2021-10-15 DIAGNOSIS — E67.8 EXCESSIVE VITAMIN B6 INTAKE: ICD-10-CM

## 2021-10-15 PROCEDURE — 36415 COLL VENOUS BLD VENIPUNCTURE: CPT

## 2021-10-15 PROCEDURE — 84207 ASSAY OF VITAMIN B-6: CPT

## 2021-10-18 ENCOUNTER — TELEPHONE (OUTPATIENT)
Dept: FAMILY MEDICINE CLINIC | Age: 51
End: 2021-10-18

## 2021-10-18 DIAGNOSIS — K86.0 ALCOHOL-INDUCED CHRONIC PANCREATITIS (HCC): ICD-10-CM

## 2021-10-18 DIAGNOSIS — E87.1 HYPONATREMIA: Primary | ICD-10-CM

## 2021-10-18 LAB — VITAMIN B6: 8.3 NMOL/L (ref 20–125)

## 2021-10-18 NOTE — TELEPHONE ENCOUNTER
Rosa Wheeler is requesting an order for Sodium to be checked. She asked to be called when ordered so she can come get drawn.

## 2021-10-18 NOTE — TELEPHONE ENCOUNTER
Last visit: 10/4/21  Last Med refill: 8/17/21  Does patient have enough medication for 72 hours: No:     Next Visit Date:  Future Appointments   Date Time Provider Lexi Rodriguezi   10/19/2021  2:00 PM MARVIN Gregory [facility]   10/25/2021  2:30 PM Carole Marques MD AFL Neph [facility] None   10/26/2021  3:00 PM Gracia Arvizu MD Resp Spec [facility]   11/1/2021  2:15 PM Ludivina Leonard MD [facility]   11/5/2021 12:45 PM Thor North MD sv gr lks [facility]   12/7/2021  1:40 PM Erum Zuniga MD Neuro Spec [facility]   1/4/2022 11:15 AM Ludivina Leonard  [address] Maintenance   Topic Date Due    Shingles Vaccine (1 of 2) Never done    Breast cancer screen  11/06/2021    COVID-19 Vaccine (3 - Pfizer booster) 10/23/2021    Potassium monitoring  10/12/2022    Creatinine monitoring  10/12/2022    Lipid screen  12/23/2025    Colon cancer screen colonoscopy  10/22/2030    DTaP/Tdap/Td vaccine (2 - Td or Tdap) 12/28/2030    Pneumococcal 0-64 years Vaccine (2 of 2 - PPSV23) 07/05/2034    Flu vaccine  Completed    Hepatitis C screen  Completed    HIV screen  Completed    Hepatitis A vaccine  Aged Out    Hepatitis B vaccine  Aged Out    Hib vaccine  Aged Out    Meningococcal (ACWY) vaccine  Aged Out       Hemoglobin A1C (%)   Date Value   04/13/2021 5.5   07/14/2019 4.3             ( goal A1C is < 7)   No results found for: LABMICR  LDL Cholesterol (mg/dL)   Date Value   12/23/2020 134 (H)   06/18/2019 92       (goal LDL is <100)   AST (U/L)   Date Value   10/12/2021 14     ALT (U/L)   Date Value   10/12/2021 7     BUN (mg/dL)   Date Value   10/12/2021 4 (L)     BP Readings from Last 3 Encounters:   10/12/21 (!) 131/91   10/04/21 136/84   09/29/21 (!) 148/96          (goal 120/80)    All Future Testing planned in [facility]  Lab Frequency Next Occurrence   CBC With Auto Differential Once 11/02/2020   Hepatic Function Panel Once 11/02/2020   Cancer Antigen 19-9 Once 11/11/2020   CBC Auto Differential Once 84/14/6840   Basic Metabolic Panel Once 63/84/1677   Hepatic Function Panel Once 11/11/2020   Cancer Antigen 19-9 Once 02/12/2021   EGD Once 03/30/2021   SLP videofluoroscopic swallow study Once 07/31/2021   COVID-19 Once 05/19/2021   EUS Once 08/17/2021   CT ABDOMEN PELVIS W WO CONTRAST Additional Contrast? Radiologist Recommendation Once 09/10/2021   DEXA VERTEBRAL FRACTURE ASSESSMENT Once 08/31/2021   FACET JOINT L/S SINGLE Once 09/16/2021   EUS Once 09/29/2021   ERCP Once 09/29/2021   Sodium Once 10/18/2021               Patient Active Problem List:     Acute bronchitis     Reflex sympathetic dystrophy of lower limb     Essential hypertension     Nicotine addiction     Psychophysiological insomnia     Anxiety     Alcoholic peripheral neuropathy (HCC)     Hypokalemia     Macrocytic anemia     Hypomagnesemia     Tobacco use     Ambulatory dysfunction     Seizure disorder ([address]     COPD with acute exacerbation (HCC)     Paresthesia of lower extremity     Acute respiratory failure with hypoxia (HCC)     Pancreatic cyst     Recurrent major depressive disorder (HCC)     Elevated CA 19-9 level     Perineal mass, female     Esophagitis candidal      Condyloma     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24, the patient underwent 2 surgical resections without known recurrence     Alcohol use disorder, severe, in early remission ([address]     Metabolic encephalopathy     AMAN (generalized anxiety disorder)     Major depressive disorder, recurrent severe without psychotic features ([address]     Esophageal dysphagia     Chronic peripheral neuropathic pain     History of alcohol abuse     History of petit-mal seizures     Intractable episodic tension-type headache     Personal history of astrocytoma     Multilevel spine pain     Chronic pain syndrome     Marijuana abuse     Severe sepsis ([address]     Closed fracture of fifth thoracic vertebra (HCC)     Diverticulitis of large intestine with abscess without bleeding     Elevated brain natriuretic peptide (BNP) level     Elevated alkaline phosphatase level     Chronic pancreatitis (HCC)     Erythema ab igne     Compression fracture of thoracic vertebra (HCC)     Pathological compression fracture of lumbar vertebra with delayed healing     Metabolic acidosis with increased anion gap and accumulation of organic acids     Community acquired pneumonia of left lower lobe of lung     Septicemia ([address]     Alcohol-induced acute pancreatitis     Fluid collection of pancreas     Diverticulitis of large intestine without perforation or abscess with bleeding     Pseudocyst of pancreas     Bandemia     Sepsis ([address]     Acute recurrent pancreatitis     Atelectasis of left lung     Hyponatremia     Iron deficiency anemia

## 2021-10-19 ENCOUNTER — HOSPITAL ENCOUNTER (OUTPATIENT)
Dept: PSYCHIATRY | Age: 51
Setting detail: THERAPIES SERIES
Discharge: HOME | End: 2021-10-19
Payer: MEDICARE

## 2021-10-19 ENCOUNTER — HOSPITAL ENCOUNTER (OUTPATIENT)
Age: 51
Discharge: HOME | End: 2021-10-19
Payer: MEDICARE

## 2021-10-19 ENCOUNTER — TELEPHONE (OUTPATIENT)
Dept: GASTROENTEROLOGY | Age: 51
End: 2021-10-19

## 2021-10-19 DIAGNOSIS — E87.1 HYPONATREMIA: ICD-10-CM

## 2021-10-19 LAB — SODIUM BLD-SCNC: 128 MMOL/L (ref 135–144)

## 2021-10-19 PROCEDURE — 90837 PSYTX W PT 60 MINUTES: CPT | Performed by: SOCIAL WORKER

## 2021-10-19 PROCEDURE — 84295 ASSAY OF SERUM SODIUM: CPT

## 2021-10-19 PROCEDURE — 36415 COLL VENOUS BLD VENIPUNCTURE: CPT

## 2021-10-19 NOTE — TELEPHONE ENCOUNTER
Pt has an EUS, and an ERCP scheduled this Thursday, and wants to know if she is prepping right. Please call pt at: [phone].

## 2021-10-19 NOTE — TELEPHONE ENCOUNTER
Patient called back states she has her questions ansered and doesn't need to speak with any one know

## 2021-10-20 ENCOUNTER — TELEPHONE (OUTPATIENT)
Dept: FAMILY MEDICINE CLINIC | Age: 51
End: 2021-10-20

## 2021-10-20 DIAGNOSIS — D72.829 LEUKOCYTOSIS, UNSPECIFIED TYPE: Primary | ICD-10-CM

## 2021-10-20 DIAGNOSIS — E87.1 HYPONATREMIA: Primary | ICD-10-CM

## 2021-10-20 RX ORDER — PANCRELIPASE 24000; 76000; 120000 [USP'U]/1; [USP'U]/1; [USP'U]/1
CAPSULE, DELAYED RELEASE PELLETS ORAL
Qty: 90 CAPSULE | Refills: 1 | Status: SHIPPED | OUTPATIENT
Start: 2021-10-20 | End: 2021-12-20
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
## 2021-10-20 NOTE — TELEPHONE ENCOUNTER
No idea what she is talking about since I got her sodium result this morning and it has already been addressed?

## 2021-10-20 NOTE — TELEPHONE ENCOUNTER
Patient called requesting a BMP be ordered.  She stated that she had one last week but nobody has let her know any results

## 2021-10-21 ENCOUNTER — HOSPITAL ENCOUNTER (OUTPATIENT)
Age: 51
Setting detail: OUTPATIENT SURGERY
Discharge: HOME | End: 2021-10-21
Attending: INTERNAL MEDICINE | Admitting: INTERNAL MEDICINE
Payer: MEDICARE

## 2021-10-21 ENCOUNTER — ANESTHESIA (OUTPATIENT)
Dept: OPERATING ROOM | Age: 51
End: 2021-10-21
Payer: MEDICARE

## 2021-10-21 ENCOUNTER — ANESTHESIA EVENT (OUTPATIENT)
Dept: OPERATING ROOM | Age: 51
End: 2021-10-21
Payer: MEDICARE

## 2021-10-21 ENCOUNTER — APPOINTMENT (OUTPATIENT)
Dept: GENERAL RADIOLOGY | Age: 51
End: 2021-10-21
Attending: INTERNAL MEDICINE
Payer: MEDICARE

## 2021-10-21 VITALS
SYSTOLIC BLOOD PRESSURE: 113 MMHG | WEIGHT: 134 LBS | OXYGEN SATURATION: 92 % | RESPIRATION RATE: 20 BRPM | HEIGHT: 63 IN | TEMPERATURE: 97.3 F | DIASTOLIC BLOOD PRESSURE: 89 MMHG | HEART RATE: 85 BPM | BODY MASS INDEX: 23.74 KG/M2

## 2021-10-21 VITALS — TEMPERATURE: 84 F | SYSTOLIC BLOOD PRESSURE: 124 MMHG | OXYGEN SATURATION: 100 % | DIASTOLIC BLOOD PRESSURE: 86 MMHG

## 2021-10-21 LAB
GFR NON-AFRICAN AMERICAN: >60 ML/MIN
GFR SERPL CREATININE-BSD FRML MDRD: >60 ML/MIN
GFR SERPL CREATININE-BSD FRML MDRD: ABNORMAL ML/MIN/{1.73_M2}
GLUCOSE BLD-MCNC: 141 MG/DL (ref 74–100)
POC CREATININE: 0.45 MG/DL (ref 0.51–1.19)
POC POTASSIUM: 3.9 MMOL/L (ref 3.5–4.5)
POC SODIUM: 129 MMOL/L (ref 138–146)

## 2021-10-21 PROCEDURE — 6360000004 HC RX CONTRAST MEDICATION: Performed by: INTERNAL MEDICINE

## 2021-10-21 PROCEDURE — C2617 STENT, NON-COR, TEM W/O DEL: HCPCS | Performed by: INTERNAL MEDICINE

## 2021-10-21 PROCEDURE — 2580000003 HC RX 258: Performed by: ANESTHESIOLOGY

## 2021-10-21 PROCEDURE — 3609018900 HC ERCP: Performed by: INTERNAL MEDICINE

## 2021-10-21 PROCEDURE — 74328 X-RAY BILE DUCT ENDOSCOPY: CPT | Performed by: INTERNAL MEDICINE

## 2021-10-21 PROCEDURE — 6360000002 HC RX W HCPCS

## 2021-10-21 PROCEDURE — 7100000001 HC PACU RECOVERY - ADDTL 15 MIN: Performed by: INTERNAL MEDICINE

## 2021-10-21 PROCEDURE — 43276 ERCP STENT EXCHANGE W/DILATE: CPT | Performed by: INTERNAL MEDICINE

## 2021-10-21 PROCEDURE — 2500000003 HC RX 250 WO HCPCS: Performed by: NURSE ANESTHETIST, CERTIFIED REGISTERED

## 2021-10-21 PROCEDURE — 2500000003 HC RX 250 WO HCPCS

## 2021-10-21 PROCEDURE — 84295 ASSAY OF SERUM SODIUM: CPT

## 2021-10-21 PROCEDURE — 43264 ERCP REMOVE DUCT CALCULI: CPT | Performed by: INTERNAL MEDICINE

## 2021-10-21 PROCEDURE — 7100000000 HC PACU RECOVERY - FIRST 15 MIN: Performed by: INTERNAL MEDICINE

## 2021-10-21 PROCEDURE — 84132 ASSAY OF SERUM POTASSIUM: CPT

## 2021-10-21 PROCEDURE — 3209999900 FLUORO FOR SURGICAL PROCEDURES

## 2021-10-21 PROCEDURE — 3609014500 HC ERCP BILIARY/PANC DUCT STENT EXCHANGE W/DIL&WIRE: Performed by: INTERNAL MEDICINE

## 2021-10-21 PROCEDURE — 82565 ASSAY OF CREATININE: CPT

## 2021-10-21 PROCEDURE — 2709999900 HC NON-CHARGEABLE SUPPLY: Performed by: INTERNAL MEDICINE

## 2021-10-21 PROCEDURE — 7100000040 HC SPAR PHASE II RECOVERY - FIRST 15 MIN: Performed by: INTERNAL MEDICINE

## 2021-10-21 PROCEDURE — 3700000001 HC ADD 15 MINUTES (ANESTHESIA): Performed by: INTERNAL MEDICINE

## 2021-10-21 PROCEDURE — 3609015200 HC ERCP REMOVE CALCULI/DEBRIS BILIARY/PANCREAS DUCT: Performed by: INTERNAL MEDICINE

## 2021-10-21 PROCEDURE — 7100000041 HC SPAR PHASE II RECOVERY - ADDTL 15 MIN: Performed by: INTERNAL MEDICINE

## 2021-10-21 PROCEDURE — 43273 ENDOSCOPIC PANCREATOSCOPY: CPT | Performed by: INTERNAL MEDICINE

## 2021-10-21 PROCEDURE — 3700000000 HC ANESTHESIA ATTENDED CARE: Performed by: INTERNAL MEDICINE

## 2021-10-21 PROCEDURE — 2720000010 HC SURG SUPPLY STERILE: Performed by: INTERNAL MEDICINE

## 2021-10-21 PROCEDURE — C1769 GUIDE WIRE: HCPCS | Performed by: INTERNAL MEDICINE

## 2021-10-21 PROCEDURE — 82947 ASSAY GLUCOSE BLOOD QUANT: CPT

## 2021-10-21 DEVICE — PANCREATIC STENT
Type: IMPLANTABLE DEVICE | Status: FUNCTIONAL
Brand: ADVANIX™ PANCREATIC STENT

## 2021-10-21 RX ORDER — DEXAMETHASONE SODIUM PHOSPHATE 4 MG/ML
INJECTION, SOLUTION INTRA-ARTICULAR; INTRALESIONAL; INTRAMUSCULAR; INTRAVENOUS; SOFT TISSUE PRN
Status: DISCONTINUED | OUTPATIENT
Start: 2021-10-21 | End: 2021-10-21 | Stop reason: SDUPTHER

## 2021-10-21 RX ORDER — METOCLOPRAMIDE HYDROCHLORIDE 5 MG/ML
10 INJECTION INTRAMUSCULAR; INTRAVENOUS
Status: DISCONTINUED | OUTPATIENT
Start: 2021-10-21 | End: 2021-10-21 | Stop reason: HOSPADM

## 2021-10-21 RX ORDER — DIPHENHYDRAMINE HYDROCHLORIDE 50 MG/ML
12.5 INJECTION INTRAMUSCULAR; INTRAVENOUS
Status: DISCONTINUED | OUTPATIENT
Start: 2021-10-21 | End: 2021-10-21 | Stop reason: HOSPADM

## 2021-10-21 RX ORDER — MEPERIDINE HYDROCHLORIDE 50 MG/ML
12.5 INJECTION INTRAMUSCULAR; INTRAVENOUS; SUBCUTANEOUS EVERY 5 MIN PRN
Status: DISCONTINUED | OUTPATIENT
Start: 2021-10-21 | End: 2021-10-21 | Stop reason: HOSPADM

## 2021-10-21 RX ORDER — PROMETHAZINE HYDROCHLORIDE 25 MG/ML
6.25 INJECTION, SOLUTION INTRAMUSCULAR; INTRAVENOUS
Status: DISCONTINUED | OUTPATIENT
Start: 2021-10-21 | End: 2021-10-21 | Stop reason: HOSPADM

## 2021-10-21 RX ORDER — LIDOCAINE HYDROCHLORIDE 10 MG/ML
INJECTION, SOLUTION EPIDURAL; INFILTRATION; INTRACAUDAL; PERINEURAL PRN
Status: DISCONTINUED | OUTPATIENT
Start: 2021-10-21 | End: 2021-10-21 | Stop reason: SDUPTHER

## 2021-10-21 RX ORDER — FENTANYL CITRATE 50 UG/ML
INJECTION, SOLUTION INTRAMUSCULAR; INTRAVENOUS PRN
Status: DISCONTINUED | OUTPATIENT
Start: 2021-10-21 | End: 2021-10-21 | Stop reason: SDUPTHER

## 2021-10-21 RX ORDER — MIDAZOLAM HYDROCHLORIDE 1 MG/ML
INJECTION INTRAMUSCULAR; INTRAVENOUS PRN
Status: DISCONTINUED | OUTPATIENT
Start: 2021-10-21 | End: 2021-10-21 | Stop reason: SDUPTHER

## 2021-10-21 RX ORDER — HYDROCODONE BITARTRATE AND ACETAMINOPHEN 5; 325 MG/1; MG/1
1 TABLET ORAL
Status: DISCONTINUED | OUTPATIENT
Start: 2021-10-21 | End: 2021-10-21 | Stop reason: HOSPADM

## 2021-10-21 RX ORDER — PROPOFOL 10 MG/ML
INJECTION, EMULSION INTRAVENOUS PRN
Status: DISCONTINUED | OUTPATIENT
Start: 2021-10-21 | End: 2021-10-21 | Stop reason: SDUPTHER

## 2021-10-21 RX ORDER — ROCURONIUM BROMIDE 10 MG/ML
INJECTION, SOLUTION INTRAVENOUS PRN
Status: DISCONTINUED | OUTPATIENT
Start: 2021-10-21 | End: 2021-10-21 | Stop reason: SDUPTHER

## 2021-10-21 RX ORDER — HYDRALAZINE HYDROCHLORIDE 20 MG/ML
5 INJECTION INTRAMUSCULAR; INTRAVENOUS EVERY 10 MIN PRN
Status: DISCONTINUED | OUTPATIENT
Start: 2021-10-21 | End: 2021-10-21 | Stop reason: HOSPADM

## 2021-10-21 RX ORDER — SODIUM CHLORIDE, SODIUM LACTATE, POTASSIUM CHLORIDE, CALCIUM CHLORIDE 600; 310; 30; 20 MG/100ML; MG/100ML; MG/100ML; MG/100ML
INJECTION, SOLUTION INTRAVENOUS CONTINUOUS
Status: DISCONTINUED | OUTPATIENT
Start: 2021-10-21 | End: 2021-10-21 | Stop reason: HOSPADM

## 2021-10-21 RX ADMIN — DEXAMETHASONE SODIUM PHOSPHATE 4 MG: 4 INJECTION, SOLUTION INTRAMUSCULAR; INTRAVENOUS at 13:25

## 2021-10-21 RX ADMIN — SODIUM CHLORIDE, POTASSIUM CHLORIDE, SODIUM LACTATE AND CALCIUM CHLORIDE: 600; 310; 30; 20 INJECTION, SOLUTION INTRAVENOUS at 12:09

## 2021-10-21 RX ADMIN — PHENYLEPHRINE HYDROCHLORIDE 100 MCG: 10 INJECTION INTRAVENOUS at 13:40

## 2021-10-21 RX ADMIN — MIDAZOLAM HYDROCHLORIDE 2 MG: 1 INJECTION, SOLUTION INTRAMUSCULAR; INTRAVENOUS at 13:11

## 2021-10-21 RX ADMIN — GLUCAGON HYDROCHLORIDE 0.5 MG: KIT at 13:50

## 2021-10-21 RX ADMIN — SODIUM CHLORIDE, POTASSIUM CHLORIDE, SODIUM LACTATE AND CALCIUM CHLORIDE: 600; 310; 30; 20 INJECTION, SOLUTION INTRAVENOUS at 14:04

## 2021-10-21 RX ADMIN — FENTANYL CITRATE 100 MCG: 50 INJECTION, SOLUTION INTRAMUSCULAR; INTRAVENOUS at 13:18

## 2021-10-21 RX ADMIN — PROPOFOL 200 MG: 10 INJECTION, EMULSION INTRAVENOUS at 13:18

## 2021-10-21 RX ADMIN — PHENYLEPHRINE HYDROCHLORIDE 100 MCG: 10 INJECTION INTRAVENOUS at 13:45

## 2021-10-21 RX ADMIN — GLUCAGON HYDROCHLORIDE 0.5 MG: KIT at 13:40

## 2021-10-21 RX ADMIN — FENTANYL CITRATE 50 MCG: 50 INJECTION, SOLUTION INTRAMUSCULAR; INTRAVENOUS at 14:03

## 2021-10-21 RX ADMIN — LIDOCAINE HYDROCHLORIDE 50 MG: 10 INJECTION, SOLUTION EPIDURAL; INFILTRATION; INTRACAUDAL; PERINEURAL at 13:18

## 2021-10-21 RX ADMIN — SUGAMMADEX 200 MG: 100 INJECTION, SOLUTION INTRAVENOUS at 14:20

## 2021-10-21 RX ADMIN — PHENYLEPHRINE HYDROCHLORIDE 100 MCG: 10 INJECTION INTRAVENOUS at 13:51

## 2021-10-21 RX ADMIN — PHENYLEPHRINE HYDROCHLORIDE 100 MCG: 10 INJECTION INTRAVENOUS at 13:28

## 2021-10-21 RX ADMIN — ROCURONIUM BROMIDE 50 MG: 10 INJECTION INTRAVENOUS at 13:18

## 2021-10-21 ASSESSMENT — PULMONARY FUNCTION TESTS
PIF_VALUE: 19
PIF_VALUE: 9
PIF_VALUE: 17
PIF_VALUE: 4
PIF_VALUE: 16
PIF_VALUE: 21
PIF_VALUE: 15
PIF_VALUE: 0
PIF_VALUE: 20
PIF_VALUE: 19
PIF_VALUE: 21
PIF_VALUE: 22
PIF_VALUE: 21
PIF_VALUE: 19
PIF_VALUE: 21
PIF_VALUE: 17
PIF_VALUE: 15
PIF_VALUE: 19
PIF_VALUE: 20
PIF_VALUE: 20
PIF_VALUE: 2
PIF_VALUE: 19
PIF_VALUE: 20
PIF_VALUE: 2
PIF_VALUE: 18
PIF_VALUE: 21
PIF_VALUE: 16
PIF_VALUE: 22
PIF_VALUE: 21
PIF_VALUE: 0
PIF_VALUE: 16
PIF_VALUE: 15
PIF_VALUE: 18
PIF_VALUE: 0
PIF_VALUE: 18
PIF_VALUE: 15
PIF_VALUE: 21
PIF_VALUE: 16
PIF_VALUE: 20
PIF_VALUE: 19
PIF_VALUE: 16
PIF_VALUE: 19
PIF_VALUE: 16
PIF_VALUE: 15
PIF_VALUE: 18
PIF_VALUE: 8
PIF_VALUE: 17
PIF_VALUE: 20
PIF_VALUE: 17
PIF_VALUE: 20
PIF_VALUE: 15
PIF_VALUE: 19
PIF_VALUE: 20
PIF_VALUE: 20
PIF_VALUE: 2
PIF_VALUE: 21
PIF_VALUE: 15
PIF_VALUE: 1
PIF_VALUE: 2
PIF_VALUE: 16
PIF_VALUE: 17
PIF_VALUE: 19
PIF_VALUE: 19
PIF_VALUE: 20
PIF_VALUE: 5
PIF_VALUE: 19
PIF_VALUE: 18
PIF_VALUE: 20
PIF_VALUE: 20
PIF_VALUE: 21
PIF_VALUE: 2
PIF_VALUE: 3
PIF_VALUE: 5
PIF_VALUE: 19

## 2021-10-21 ASSESSMENT — PAIN - FUNCTIONAL ASSESSMENT: PAIN_FUNCTIONAL_ASSESSMENT: 0-10

## 2021-10-21 ASSESSMENT — PAIN SCALES - GENERAL
PAINLEVEL_OUTOF10: 0

## 2021-10-21 ASSESSMENT — LIFESTYLE VARIABLES: SMOKING_STATUS: 1

## 2021-10-21 NOTE — ANESTHESIA PRE PROCEDURE
Department of Anesthesiology  Preprocedure Note       Name:  Pradip Clarke   Age:  45 y.o.  :  1970                                          MRN:  [id number]         Date:  10/21/2021      Surgeon: Ernesto Hansen):  Alfredo Simmons MD    Procedure: Procedure(s):  ERCP ENDOSCOPIC RETROGRADE CHOLANGIOPANCREATOGRAPHY, STENT REMOVAL,  C-ARM, GI UNIT SCHEDULED  ENDOSCOPIC ULTRASOUND    Medications prior to admission:   Prior to Admission medications    Medication Sig Start Date End Date Taking? Authorizing Provider   pregabalin (LYRICA) 200 MG capsule Take 1 capsule by mouth 3 times daily for 180 days.  10/9/21 4/7/22 Yes Tran Mccarty MD   acamprosate (CAMPRAL) 333 MG tablet TAKE TWO TABLETS BY MOUTH THREE TIMES A DAY 10/7/21  Yes LOI Obrien NP   carBAMazepine (TEGRETOL) 200 MG tablet TAKE ONE TABLET BY MOUTH THREE TIMES A DAY 10/6/21  Yes Ludivina Lange MD   topiramate (TOPAMAX) 50 MG tablet TAKE ONE TABLET BY MOUTH TWICE A DAY 10/6/21  Yes Ludivina Lange MD   busPIRone (BUSPAR) 30 MG tablet TAKE ONE TABLET BY MOUTH TWICE A DAY WITH A MEAL 10/6/21  Yes LOI Obrien NP   spironolactone (ALDACTONE) 50 MG tablet TAKE ONE TABLET BY MOUTH DAILY 10/6/21  Yes Ludivina Lange MD   amLODIPine (NORVASC) 5 MG tablet TAKE ONE TABLET BY MOUTH DAILY 10/6/21  Yes Ludivina Lange MD   escitalopram (LEXAPRO) 5 MG tablet TAKE ONE TABLET BY MOUTH DAILY 10/6/21  Yes LOI Obrien NP   KLOR-CON M20 20 MEQ extended release tablet TAKE ONE TABLET BY MOUTH DAILY 10/6/21  Yes Ludivina Lange MD   traMADol Cleta Aliment) 50 MG tablet  21  Yes Genevieve Azar DDS   sodium chloride 1 g tablet Take 1 tablet by mouth 4 times daily 10/4/21  Yes Ludivina Lange MD   DULoxetine (CYMBALTA) 60 MG extended release capsule Take 60 mg by mouth daily  21  Yes Historical Provider, MD   simethicone (MYLICON) 80 MG chewable tablet Take 1 tablet by mouth 4 times daily as needed for Flatulence 21 Yes Rosie Araujo MD   metoclopramide (REGLAN) 5 MG tablet Take 1 tablet by mouth 3 times daily 8/19/21  Yes Rosie Araujo MD   omeprazole (PRILOSEC) 40 MG delayed release capsule Take 1 capsule by mouth 2 times daily 8/19/21  Yes Rosie Araujo MD   traZODone (DESYREL) 50 MG tablet Take 1 tablet by mouth nightly as needed for Sleep 8/5/21 10/19/21 Yes LOI Mcginnis - NP   baclofen (LIORESAL) 10 MG tablet Take 1 tablet by mouth 3 times daily  Patient taking differently: Take 10 mg by mouth as needed  5/25/21  Yes Ludivina Maria MD   ferrous sulfate (IRON 325) 325 (65 Fe) MG tablet Take 1 tablet by mouth 2 times daily 4/22/21  Yes Ludivina Maria MD   rOPINIRole (REQUIP) 1 MG tablet Take 1 tablet by mouth nightly 4/1/21  Yes Ludivina Maria MD   budesonide-formoterol [facility]) 160-4.5 MCG/ACT AERO Inhale 2 puffs into the lungs 2 times daily 3/31/21  Yes Ludivina Maria MD   tiotropium (SPIRIVA RESPIMAT) 2.5 MCG/ACT AERS inhaler Inhale 2 puffs into the lungs daily 2/26/21 10/19/21 Yes Huber Gerard MD   albuterol sulfate HFA (VENTOLIN HFA) 108 (90 Base) MCG/ACT inhaler Inhale 2 puffs into the lungs 4 times daily as needed for Wheezing 6/11/20  Yes Ludivina Maria MD   vitamin D (ERGOCALCIFEROL) 28434 units CAPS capsule Take 1 capsule by mouth once a week 6/19/19  Yes Ludivina Maria MD   folic acid (FOLVITE) 1 MG tablet Take 1 tablet by mouth daily 6/19/19  Yes Ludivina Maria MD   CREON 43045-79409 units delayed release capsule TAKE ONE CAPSULE BY MOUTH THREE TIMES A DAY WITH MEALS 10/20/21   Ludivina Maria MD   chlorhexidine (PERIDEX) 0.12 % solution  9/30/21   Jamison Chin DDS   zoster recombinant adjuvanted vaccine [facility]) 50 MCG/0.5ML SUSR injection Inject 0.5 mLs into the muscle See Admin Instructions 1 dose now and repeat in 2-6 months 10/4/21 4/2/22  Ludivina Maria MD   hydrOXYzine (ATARAX) 25 MG tablet Take 1 tablet by mouth 3 times daily as needed for Anxiety  Patient taking differently: Take 25 mg by mouth 3 times daily  8/25/21   Ron Chester APRN - NP   vitamin B-1 (THIAMINE) 100 MG tablet Take 1 tablet by mouth daily 7/12/19   Ludivina Modi MD       Current medications:    No current facility-administered medications for this encounter. Allergies:  No Known Allergies    Problem List:    Patient Active Problem List   Diagnosis Code    Acute bronchitis J20.9    Reflex sympathetic dystrophy of lower limb G90.529    Essential hypertension I10    Nicotine addiction F17.200    Psychophysiological insomnia F51.04    Anxiety S39.1    Alcoholic peripheral neuropathy (HCC) G62.1    Hypokalemia E87.6    Macrocytic anemia D53.9    Hypomagnesemia E83.42    Tobacco use Z72.0    Ambulatory dysfunction R26.2    Seizure disorder ([address] G40.909    COPD with acute exacerbation (HCC) J44.1    Paresthesia of lower extremity R20.2    Acute respiratory failure with hypoxia (HCC) J96.01    Pancreatic cyst K86.2    Recurrent major depressive disorder (HCC) F33.9    Elevated CA 19-9 level R97.8    Perineal mass, female N90.89    Esophagitis candidal  B37.81    Condyloma A63.0    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24, the patient underwent 2 surgical resections without known recurrence C71.9    Alcohol use disorder, severe, in early remission ([address] K03.84    Metabolic encephalopathy A35.42    AMAN (generalized anxiety disorder) F41.1    Major depressive disorder, recurrent severe without psychotic features ([address] F33.2    Esophageal dysphagia R13.19    Chronic peripheral neuropathic pain M79.2, G89.29    History of alcohol abuse F10.11    History of petit-mal seizures Z86.69    Intractable episodic tension-type headache G44. [address] history of astrocytoma Z85.841    Multilevel spine pain M54.9    Chronic pain syndrome G89.4    Marijuana abuse F12.10    Severe sepsis (HCC) A41.9, R65.20    Closed fracture of fifth thoracic vertebra ([facility] S22.059A    Diverticulitis of large intestine with abscess without bleeding K57.20    Elevated brain natriuretic peptide (BNP) level R79.89    Elevated alkaline phosphatase level R74.8    Chronic pancreatitis ([facility]) K86.1    Erythema ab igne L59.0    Compression fracture of thoracic vertebra ([facility]) S22.000A    Pathological compression fracture of lumbar vertebra with delayed healing K41.60YO    Metabolic acidosis with increased anion gap and accumulation of organic acids E87.2    Community acquired pneumonia of left lower lobe of lung J18.9    Septicemia ([facility]) A41.9    Alcohol-induced acute pancreatitis K85.20    Fluid collection of pancreas K86.89    Diverticulitis of large intestine without perforation or abscess with bleeding K57.33    Pseudocyst of pancreas K86.3    Bandemia D72.825    Sepsis ([facility]) A41.9    Acute recurrent pancreatitis K85.90    Atelectasis of left lung J98.11    Hyponatremia E87.1    Iron deficiency anemia D50.9       Past Medical History:        Diagnosis Date    Acute respiratory failure with hypoxia ([facility] 10/16/2020    Alcohol withdrawal syndrome, with delirium ([facility] 12/14/2019    Alcoholism ([facility]     Anemia 10/2020    GI bleed    Anxiety     Astrocytoma ([facility] - 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24, the patient underwent 2 surgical resections without known recurrence 10/23/2020    Closed fracture of lateral portion of left tibial plateau 95/53/9448    COPD (chronic obstructive pulmonary disease) ([facility])     CO2 retainer, on Bipap at night for this, Dr. Myrla Mcburney ( last visit 11/20/2020 and note on chart )    Depression     bipolar, major depressive disorder, ptsd, anxiety    Dysphagia     GI bleed 10/2020    Hypertension     Memory loss     Oxygen dependent     USES AT NIGHT - 3L/MIN    Pain, joint, ankle and foot     Pancreatic lesion 10/2020    Dr. Marilu Betts working up pt    Peripheral neuropathy     Seizures ([facility]     LAST SEIZURE 3/2020 - FEELS IT WAS FROM ALCOHOL WITHDRAWL    Tension headache     Under care of team 09/29/2021    neuro-Dr Coyle[facility]-last visit sep 2021    Under care of team 09/29/2021    pain management-Hamzah Martines-nery jimenez-last visit sep 2021    Under care of team 09/29/2021    pulmonology-Dr [facility]-due for visit oct 26/2021    Under care of team 09/29/2021    psych-bahnfeldt NP-telemed-last visit 10/ 2021    Under care of team 09/29/2021    iq-[address]-last visit aug 2021    Under care of team     NEPHROLOGY - DR. CHAPMAN     Wears glasses     Wears partial dentures     UPPER    Wellness examination 09/29/2021    pcp-Ludivina grimes-last visit june 2021       Past Surgical History:        Procedure Laterality Date    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    COLONOSCOPY N/A 10/22/2020    COLONOSCOPY DIAGNOSTIC performed by Ever Arriaga MD at [facility]  7/28/2021    CT ABSCESS DRAIN SUBCUTANEOUS 7/28/2021 [facility] CT SCAN    ENDOSCOPIC ULTRASOUND (LOWER) N/A 12/9/2020    ENDOSCOPIC ULTRASOUND, UPPER WITH LINEAR SCOPE FOR BIOPSY OF MASS ON HEAD OF PANCREAS performed by Elsie Snellen, MD at [address] [facility] ERCP  08/11/2021    ERCP N/A 8/11/2021    ERCP STENT INSERTION performed by Natalie Torres MD at \[facility]"" Endoscopy    ERCP  8/11/2021    ERCP SPHINCTER/PAPILLOTOMY performed by Natalie Torres MD at [address] [facility] 7-3-13    ORIF tibial plateau    FRACTURE SURGERY Right     small finger metacarpal fracture    HAND SURGERY      pins    HYSTERECTOMY  2003    SPINE SURGERY N/A 9/10/2021    KYPHOPLASTY lumbar L5 performed by Desmond Morales MD at [address] 10/22/2020    EGD BIOPSY performed by Ever Arriaga MD at [address] [place] [address] 4/5/2021    EGD BIOPSY performed by Ever Arriaga MD at [address] [place] [address] N/A 9/8/2021    ENDOSCOPIC ULTRASOUND, LINEAR SCOPE performed by Ingrid Peck MD at [facility]       Social History:    Social History     Tobacco Use    Smoking status: Current Every Day Smoker     Packs/day: 1.00     Years: 30.00     Pack years: 30.00     Types: Cigarettes    Smokeless tobacco: Never Used   Substance Use Topics    Alcohol use: Not Currently     Alcohol/week: 0.0 standard drinks                                Ready to quit: Not Answered  Counseling given: Not Answered      Vital Signs (Current):   Vitals:    10/19/21 1010   Weight: 134 lb 7.7 oz (61 kg)   Height: 5' 3.58\" (1.615 m)                                              BP Readings from Last 3 Encounters:   10/12/21 (!) 131/91   10/04/21 136/84   09/29/21 (!) 148/96       NPO Status:                                                                                 BMI:   Wt Readings from Last 3 Encounters:   10/19/21 134 lb 7.7 oz (61 kg)   10/04/21 134 lb 6.4 oz (61 kg)   09/29/21 134 lb (60.8 kg)     Body mass index is 23.39 kg/m². CBC:   Lab Results   Component Value Date    WBC 13.5 10/12/2021    RBC 4.49 10/12/2021    RBC 4.16 04/30/2012    HGB 13.0 10/12/2021    HCT 39.9 10/12/2021    MCV 88.9 10/12/2021    RDW 13.2 10/12/2021     10/12/2021     04/30/2012       CMP:   Lab Results   Component Value Date     10/19/2021    K 4.7 10/12/2021    CL 92 10/12/2021    CO2 21 10/12/2021    BUN 4 10/12/2021    CREATININE 0.31 10/12/2021    GFRAA >60 10/12/2021    LABGLOM >60 10/12/2021    GLUCOSE 104 10/12/2021    GLUCOSE 92 04/30/2012    PROT 7.3 10/12/2021    CALCIUM 9.4 10/12/2021    BILITOT 0.19 10/12/2021    ALKPHOS 211 10/12/2021    AST 14 10/12/2021    ALT 7 10/12/2021       POC Tests: No results for input(s): POCGLU, POCNA, POCK, POCCL, POCBUN, POCHEMO, POCHCT in the last 72 hours.     Coags:   Lab Results   Component Value Date    PROTIME 10.9 08/09/2021    INR 1.0 08/09/2021    APTT 24.6 08/09/2021       HCG (If Applicable): No results found for: PREGTESTUR, PREGSERUM, HCG, HCGQUANT     ABGs: No results found for: PHART, PO2ART, DBI2DPQ, OWN1LDC, BEART, O3RRDADA     Type & Screen (If Applicable):  No results found for: LABABO, LABRH    Drug/Infectious Status (If Applicable):  Lab Results   Component Value Date    HEPCAB NONREACTIVE 07/14/2019       COVID-19 Screening (If Applicable):   Lab Results   Component Value Date    COVID19 Not Detected 07/22/2021    COVID19 Not Detected 05/20/2021    COVID19 Not Detected 12/05/2020           Anesthesia Evaluation  Patient summary reviewed and Nursing notes reviewed no history of anesthetic complications:   Airway: Mallampati: I        Dental:    (+) upper dentures and partials      Pulmonary:normal exam    (+) COPD:  sleep apnea: on CPAP,  current smoker                          ROS comment: Oxygen 3 liters / min at night  bipap for CO2 retention   Cardiovascular:    (+) hypertension:, past MI: > 6 months,                   Neuro/Psych:   (+) seizures:, neuromuscular disease (alcoholic neuropathy):, depression/anxiety              ROS comment: RSD of lower limb GI/Hepatic/Renal:            ROS comment: Chronic pancreatitis. Endo/Other:    (+) electrolyte abnormalities (chronic hyponatremia), . Abdominal:             Vascular: Other Findings:           Anesthesia Plan      general     ASA 3       Induction: intravenous. MIPS: Postoperative opioids intended and Prophylactic antiemetics administered. Anesthetic plan and risks discussed with patient. Plan discussed with CRNA.                   Marisela Last MD   10/21/2021

## 2021-10-21 NOTE — H&P
History and Physical    Pt Name: Alison Graf  MRN: [id number]  YOB: 1970  Date of evaluation: 10/21/2021    SUBJECTIVE:   History of Chief Complaint:    Patient presents preprocedure for ERCP and stent removal.  She reports a history of pancreatitis and stricture. She says that she believes it was scar tissue causing the blockage. She has had ERCP with stent placement and then more recently EUS. Patient states that she hopes to have the stent removed today. She has been scheduled for ERCP and stent removal.  Past Medical History    has a past medical history of Acute respiratory failure with hypoxia ([address] Alcohol withdrawal syndrome, with delirium ([address] Alcoholism ([address] Anemia, Anxiety, 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24, the patient underwent 2 surgical resections without known recurrence, Closed fracture of lateral portion of left tibial plateau, COPD (chronic obstructive pulmonary disease) ([address] Depression, Dysphagia, GI bleed, Hypertension, Memory loss, Oxygen dependent, Pain, joint, ankle and foot, Pancreatic lesion, Peripheral neuropathy, Seizures ([address] Tension headache, Under care of team, Under care of team, Under care of team, Under care of team, Under care of team, Under care of team, Wears glasses, Wears partial dentures, and Wellness examination. Past Surgical History   has a past surgical history that includes Hysterectomy (2003); Brain tumor excision (1989); fracture surgery (Left, 7-3-13); fracture surgery (Right); Upper gastrointestinal endoscopy (N/A, 10/22/2020); Colonoscopy (N/A, 10/22/2020); Endoscopic ultrasonography, GI (N/A, 12/9/2020); Upper gastrointestinal endoscopy (N/A, 4/5/2021); Hand surgery; CT ABSCESS DRAINAGE (7/28/2021); ERCP (08/11/2021); ERCP (N/A, 8/11/2021); ERCP (8/11/2021); Upper gastrointestinal endoscopy (N/A, 9/8/2021); and Spine surgery (N/A, 9/10/2021). Medications  Prior to Admission medications    Medication Sig Start Date End Date Taking? Authorizing Provider   pregabalin (LYRICA) 200 MG capsule Take 1 capsule by mouth 3 times daily for 180 days.  10/9/21 4/7/22 Yes Steven Wood MD   acamprosate (CAMPRAL) 333 MG tablet TAKE TWO TABLETS BY MOUTH THREE TIMES A DAY 10/7/21  Yes LOI Collazo NP   carBAMazepine (TEGRETOL) 200 MG tablet TAKE ONE TABLET BY MOUTH THREE TIMES A DAY 10/6/21  Yes Ludivina Vail MD   topiramate (TOPAMAX) 50 MG tablet TAKE ONE TABLET BY MOUTH TWICE A DAY 10/6/21  Yes Ludivina Vail MD   busPIRone (BUSPAR) 30 MG tablet TAKE ONE TABLET BY MOUTH TWICE A DAY WITH A MEAL 10/6/21  Yes LOI Collazo NP   spironolactone (ALDACTONE) 50 MG tablet TAKE ONE TABLET BY MOUTH DAILY 10/6/21  Yes Ludivina Vail MD   amLODIPine (NORVASC) 5 MG tablet TAKE ONE TABLET BY MOUTH DAILY 10/6/21  Yes Ludivina Vail MD   escitalopram (LEXAPRO) 5 MG tablet TAKE ONE TABLET BY MOUTH DAILY 10/6/21  Yes LOI Collazo NP   KLOR-CON M20 20 MEQ extended release tablet TAKE ONE TABLET BY MOUTH DAILY 10/6/21  Yes Ludivina Vail MD   traMADol [facility]) 50 MG tablet  9/30/21  Yes Genevieve Azar DDS   sodium chloride 1 g tablet Take 1 tablet by mouth 4 times daily 10/4/21  Yes Ludivina Vail MD   DULoxetine (CYMBALTA) 60 MG extended release capsule Take 60 mg by mouth daily  9/7/21  Yes Historical Provider, MD   simethicone (MYLICON) 80 MG chewable tablet Take 1 tablet by mouth 4 times daily as needed for Flatulence 8/27/21  Yes Oswald Rivera MD   metoclopramide (REGLAN) 5 MG tablet Take 1 tablet by mouth 3 times daily 8/19/21  Yes Oswald Rivera MD   omeprazole (PRILOSEC) 40 MG delayed release capsule Take 1 capsule by mouth 2 times daily 8/19/21  Yes Oswald Rivera MD   traZODone (DESYREL) 50 MG tablet Take 1 tablet by mouth nightly as needed for Sleep 8/5/21 10/19/21 Yes LOI Collazo NP   baclofen (LIORESAL) 10 MG tablet Take 1 tablet by mouth 3 times daily  Patient taking differently: Take 10 mg by mouth as needed  5/25/21  Yes Ludivina Johnson MD   ferrous sulfate (IRON 325) 325 (65 Fe) MG tablet Take 1 tablet by mouth 2 times daily 4/22/21  Yes Ludivina Johnson MD   rOPINIRole (REQUIP) 1 MG tablet Take 1 tablet by mouth nightly 4/1/21  Yes Ludivina Johnson MD   budesonide-formoterol [facility]) 160-4.5 MCG/ACT AERO Inhale 2 puffs into the lungs 2 times daily 3/31/21  Yes Ludivina Johnson MD   tiotropium (SPIRIVA RESPIMAT) 2.5 MCG/ACT AERS inhaler Inhale 2 puffs into the lungs daily 2/26/21 10/19/21 Yes Nae Altamirano MD   albuterol sulfate HFA (VENTOLIN HFA) 108 (90 Base) MCG/ACT inhaler Inhale 2 puffs into the lungs 4 times daily as needed for Wheezing 6/11/20  Yes Ludivina Johnson MD   vitamin D (ERGOCALCIFEROL) 80640 units CAPS capsule Take 1 capsule by mouth once a week 6/19/19  Yes Ludivina Johnson MD   folic acid (FOLVITE) 1 MG tablet Take 1 tablet by mouth daily 6/19/19  Yes Ludivina Johnson MD   CREON 52472-89172 units delayed release capsule TAKE ONE CAPSULE BY MOUTH THREE TIMES A DAY WITH MEALS 10/20/21   Ludivina Johnson MD   chlorhexidine (PERIDEX) 0.12 % solution  9/30/21   Val Short DDS   zoster recombinant adjuvanted vaccine [facility]) 50 MCG/0.5ML SUSR injection Inject 0.5 mLs into the muscle See Admin Instructions 1 dose now and repeat in 2-6 months 10/4/21 4/2/22  Ludivina Johnson MD   hydrOXYzine (ATARAX) 25 MG tablet Take 1 tablet by mouth 3 times daily as needed for Anxiety  Patient taking differently: Take 25 mg by mouth 3 times daily  8/25/21   LOI Manley - NP   vitamin B-1 (THIAMINE) 100 MG tablet Take 1 tablet by mouth daily 7/12/19   Ludivina Johnson MD     Allergies  has No Known Allergies. Family History  family history includes Arthritis in her mother; Cancer in her maternal grandmother; Esophageal Cancer in her maternal aunt; Heart Disease in her paternal grandmother; Other in her father.   Social History   reports that she has been smoking cigarettes. She has a 30.00 pack-year smoking history. She has never used smokeless tobacco.   reports previous alcohol use. reports previous drug use. Frequency: 2.00 times per week. REVIEW OF SYSTEMS    Constitutional: [] fever  [] chills  [] weight loss  []weakness [] negative  Eyes:  [] photophobia  [] discharge [] acuity change   [] Diplopia   [] negative  HENT:  [] sore throat  [] ear pain [] Tinnitus   [] negative  Respiratory:  [] Cough  [] Shortness of breath   [] Sputum   [] negative  Cardiac: []Chest pain   []Palpitations []Edema  []PND  [] negative  GI:  [x]Abdominal pain   []Nausea  []Vomiting  []Diarrhea  [] negative  See HPI above  :  [] Dysuria   []Frequency  []Hematuria  []Discharge  [] negative  Musculoskeletal:  []Back pain  []Neck pain  []Recent Injury [] negative  Skin:  []Rash  [] Itching  [] negative  Neurologic:  [] Headache  [] Focal weakness  [] Sensory changes []negative  Endocrine:  [] Polyuria  [] Polydipsia  [] Hair Loss  [] negative  Lymphatic:   [] Swollen glands [] negative  Psychiatric:  [] Depression  [] Suicidal ideation  [] Homicidal ideation  [] Anxiety [] negative  All systems negative except as marked. OBJECTIVE:   VITALS:  height is 5' 3.58\" (1.615 m) and weight is 134 lb 7.7 oz (61 kg). CONSTITUTIONAL:alert & cooperative, no acute distress. Very pleasant. Raspy voice. SKIN:  Warm and dry, no rashes on exposed areas of skin. HEAD:  Normocephalic, atraumatic. EYES: EOMs intact. EARS:  Hearing grossly WNL. NOSE:  Nares patent. No rhinorrhea. MOUTH/THROAT:  benign  NECK:supple, no lymphadenopathy  LUNGS: Clear to auscultation bilaterally. Scattered rhonchi that clear with cough. CARDIOVASCULAR: Heart sounds are normal.  Regular rate and rhythm without murmur. ABDOMEN: soft, non tender, non distended. EXTREMITIES: trace edema bilateral lower extremities.     IMPRESSIONS:   Chronic pancreatitis   has a past medical history of Acute respiratory failure with hypoxia ([address] (10/16/2020), Alcohol withdrawal syndrome, with delirium ([address] (12/14/2019), Alcoholism ([address] Anemia (10/2020), Anxiety, 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24, the patient underwent 2 surgical resections without known recurrence (10/23/2020), Closed fracture of lateral portion of left tibial plateau (64/10/8071), COPD (chronic obstructive pulmonary disease) ([address] Depression, Dysphagia, GI bleed (10/2020), Hypertension, Memory loss, Oxygen dependent, Pain, joint, ankle and foot, Pancreatic lesion (10/2020), Peripheral neuropathy, Seizures ([address] Tension headache, Under care of team (09/29/2021), Under care of team (09/29/2021), Under care of team (09/29/2021), Under care of team (09/29/2021), Under care of team (09/29/2021), Under care of team, Wears glasses, Wears partial dentures, and Wellness examination (09/29/2021).    PLANS:   ERCP, stent removal    MELLO Bar PA-C  Electronically signed 10/21/2021 at 11:46 AM

## 2021-10-21 NOTE — H&P (VIEW-ONLY)
History and Physical    Pt Name: Sandra Nava  MRN: [id number]  YOB: 1970  Date of evaluation: 10/21/2021    SUBJECTIVE:   History of Chief Complaint:    Patient presents preprocedure for ERCP and stent removal.  She reports a history of pancreatitis and stricture. She says that she believes it was scar tissue causing the blockage. She has had ERCP with stent placement and then more recently EUS. Patient states that she hopes to have the stent removed today. She has been scheduled for ERCP and stent removal.  Past Medical History    has a past medical history of Acute respiratory failure with hypoxia ([address] Alcohol withdrawal syndrome, with delirium ([address] Alcoholism ([address] Anemia, Anxiety, 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24, the patient underwent 2 surgical resections without known recurrence, Closed fracture of lateral portion of left tibial plateau, COPD (chronic obstructive pulmonary disease) ([address] Depression, Dysphagia, GI bleed, Hypertension, Memory loss, Oxygen dependent, Pain, joint, ankle and foot, Pancreatic lesion, Peripheral neuropathy, Seizures ([address] Tension headache, Under care of team, Under care of team, Under care of team, Under care of team, Under care of team, Under care of team, Wears glasses, Wears partial dentures, and Wellness examination. Past Surgical History   has a past surgical history that includes Hysterectomy (2003); Brain tumor excision (1989); fracture surgery (Left, 7-3-13); fracture surgery (Right); Upper gastrointestinal endoscopy (N/A, 10/22/2020); Colonoscopy (N/A, 10/22/2020); Endoscopic ultrasonography, GI (N/A, 12/9/2020); Upper gastrointestinal endoscopy (N/A, 4/5/2021); Hand surgery; CT ABSCESS DRAINAGE (7/28/2021); ERCP (08/11/2021); ERCP (N/A, 8/11/2021); ERCP (8/11/2021); Upper gastrointestinal endoscopy (N/A, 9/8/2021); and Spine surgery (N/A, 9/10/2021). Medications  Prior to Admission medications    Medication Sig Start Date End Date Taking? Authorizing Provider   pregabalin (LYRICA) 200 MG capsule Take 1 capsule by mouth 3 times daily for 180 days.  10/9/21 4/7/22 Yes Frantz Maurice MD   acamprosate (CAMPRAL) 333 MG tablet TAKE TWO TABLETS BY MOUTH THREE TIMES A DAY 10/7/21  Yes LOI Rdz NP   carBAMazepine (TEGRETOL) 200 MG tablet TAKE ONE TABLET BY MOUTH THREE TIMES A DAY 10/6/21  Yes Ludivina Bach MD   topiramate (TOPAMAX) 50 MG tablet TAKE ONE TABLET BY MOUTH TWICE A DAY 10/6/21  Yes Ludivina Bach MD   busPIRone (BUSPAR) 30 MG tablet TAKE ONE TABLET BY MOUTH TWICE A DAY WITH A MEAL 10/6/21  Yes LOI Rdz NP   spironolactone (ALDACTONE) 50 MG tablet TAKE ONE TABLET BY MOUTH DAILY 10/6/21  Yes Ludivina Bach MD   amLODIPine (NORVASC) 5 MG tablet TAKE ONE TABLET BY MOUTH DAILY 10/6/21  Yes Ludivina Bach MD   escitalopram (LEXAPRO) 5 MG tablet TAKE ONE TABLET BY MOUTH DAILY 10/6/21  Yes LOI Rdz NP   KLOR-CON M20 20 MEQ extended release tablet TAKE ONE TABLET BY MOUTH DAILY 10/6/21  Yes Ludivina Bach MD   traMADol Angela Me[facility]) 50 MG tablet  9/30/21  Yes Genevieve Azar DDS   sodium chloride 1 g tablet Take 1 tablet by mouth 4 times daily 10/4/21  Yes Ludivina Bach MD   DULoxetine (CYMBALTA) 60 MG extended release capsule Take 60 mg by mouth daily  9/7/21  Yes Historical Provider, MD   simethicone (MYLICON) 80 MG chewable tablet Take 1 tablet by mouth 4 times daily as needed for Flatulence 8/27/21  Yes Kye Lam MD   metoclopramide (REGLAN) 5 MG tablet Take 1 tablet by mouth 3 times daily 8/19/21  Yes Kye Lam MD   omeprazole (PRILOSEC) 40 MG delayed release capsule Take 1 capsule by mouth 2 times daily 8/19/21  Yes Kye Lam MD   traZODone (DESYREL) 50 MG tablet Take 1 tablet by mouth nightly as needed for Sleep 8/5/21 10/19/21 Yes LOI Rdz - NP   baclofen (LIORESAL) 10 MG tablet Take 1 tablet by mouth 3 times daily  Patient taking differently: Take 10 mg by mouth as needed  5/25/21  Yes Ludivina Luu MD   ferrous sulfate (IRON 325) 325 (65 Fe) MG tablet Take 1 tablet by mouth 2 times daily 4/22/21  Yes Ludivina Luu MD   rOPINIRole (REQUIP) 1 MG tablet Take 1 tablet by mouth nightly 4/1/21  Yes Ludivina Luu MD   budesonide-formoterol [facility]) 160-4.5 MCG/ACT AERO Inhale 2 puffs into the lungs 2 times daily 3/31/21  Yes Ludivina Luu MD   tiotropium (SPIRIVA RESPIMAT) 2.5 MCG/ACT AERS inhaler Inhale 2 puffs into the lungs daily 2/26/21 10/19/21 Yes Hilary Plata MD   albuterol sulfate HFA (VENTOLIN HFA) 108 (90 Base) MCG/ACT inhaler Inhale 2 puffs into the lungs 4 times daily as needed for Wheezing 6/11/20  Yes Ludivina Luu MD   vitamin D (ERGOCALCIFEROL) 19862 units CAPS capsule Take 1 capsule by mouth once a week 6/19/19  Yes Ludivina Luu MD   folic acid (FOLVITE) 1 MG tablet Take 1 tablet by mouth daily 6/19/19  Yes Ludivina Luu MD   CREON 81149-47093 units delayed release capsule TAKE ONE CAPSULE BY MOUTH THREE TIMES A DAY WITH MEALS 10/20/21   Ludivina Luu MD   chlorhexidine (PERIDEX) 0.12 % solution  9/30/21   Genevieve Oquendo DDS   zoster recombinant adjuvanted vaccine [facility]) 50 MCG/0.5ML SUSR injection Inject 0.5 mLs into the muscle See Admin Instructions 1 dose now and repeat in 2-6 months 10/4/21 4/2/22  Ludivina Luu MD   hydrOXYzine (ATARAX) 25 MG tablet Take 1 tablet by mouth 3 times daily as needed for Anxiety  Patient taking differently: Take 25 mg by mouth 3 times daily  8/25/21   LOI Fragoso NP   vitamin B-1 (THIAMINE) 100 MG tablet Take 1 tablet by mouth daily 7/12/19   Ludivina Luu MD     Allergies  has No Known Allergies. Family History  family history includes Arthritis in her mother; Cancer in her maternal grandmother; Esophageal Cancer in her maternal aunt; Heart Disease in her paternal grandmother; Other in her father.   Social History   reports that she has been smoking cigarettes. She has a 30.00 pack-year smoking history. She has never used smokeless tobacco.   reports previous alcohol use. reports previous drug use. Frequency: 2.00 times per week. REVIEW OF SYSTEMS    Constitutional: [] fever  [] chills  [] weight loss  []weakness [] negative  Eyes:  [] photophobia  [] discharge [] acuity change   [] Diplopia   [] negative  HENT:  [] sore throat  [] ear pain [] Tinnitus   [] negative  Respiratory:  [] Cough  [] Shortness of breath   [] Sputum   [] negative  Cardiac: []Chest pain   []Palpitations []Edema  []PND  [] negative  GI:  [x]Abdominal pain   []Nausea  []Vomiting  []Diarrhea  [] negative  See HPI above  :  [] Dysuria   []Frequency  []Hematuria  []Discharge  [] negative  Musculoskeletal:  []Back pain  []Neck pain  []Recent Injury [] negative  Skin:  []Rash  [] Itching  [] negative  Neurologic:  [] Headache  [] Focal weakness  [] Sensory changes []negative  Endocrine:  [] Polyuria  [] Polydipsia  [] Hair Loss  [] negative  Lymphatic:   [] Swollen glands [] negative  Psychiatric:  [] Depression  [] Suicidal ideation  [] Homicidal ideation  [] Anxiety [] negative  All systems negative except as marked. OBJECTIVE:   VITALS:  height is 5' 3.58\" (1.615 m) and weight is 134 lb 7.7 oz (61 kg). CONSTITUTIONAL:alert & cooperative, no acute distress. Very pleasant. Raspy voice. SKIN:  Warm and dry, no rashes on exposed areas of skin. HEAD:  Normocephalic, atraumatic. EYES: EOMs intact. EARS:  Hearing grossly WNL. NOSE:  Nares patent. No rhinorrhea. MOUTH/THROAT:  benign  NECK:supple, no lymphadenopathy  LUNGS: Clear to auscultation bilaterally. Scattered rhonchi that clear with cough. CARDIOVASCULAR: Heart sounds are normal.  Regular rate and rhythm without murmur. ABDOMEN: soft, non tender, non distended. EXTREMITIES: trace edema bilateral lower extremities.     IMPRESSIONS:   Chronic pancreatitis   has a past medical history of Acute respiratory failure with hypoxia ([address] (10/16/2020), Alcohol withdrawal syndrome, with delirium ([address] (12/14/2019), Alcoholism ([address] Anemia (10/2020), Anxiety, 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24, the patient underwent 2 surgical resections without known recurrence (10/23/2020), Closed fracture of lateral portion of left tibial plateau (29/03/8441), COPD (chronic obstructive pulmonary disease) ([address] Depression, Dysphagia, GI bleed (10/2020), Hypertension, Memory loss, Oxygen dependent, Pain, joint, ankle and foot, Pancreatic lesion (10/2020), Peripheral neuropathy, Seizures ([address] Tension headache, Under care of team (09/29/2021), Under care of team (09/29/2021), Under care of team (09/29/2021), Under care of team (09/29/2021), Under care of team (09/29/2021), Under care of team, Wears glasses, Wears partial dentures, and Wellness examination (09/29/2021).    PLANS:   ERCP, stent removal    MELLO Duncan PA-C  Electronically signed 10/21/2021 at 11:46 AM

## 2021-10-21 NOTE — OP NOTE
tomorrow.   Repeat ERCP in 3 months for stent removal.    Electronically signed by Nakul Hinojosa MD, [facility] on 10/21/2021 at 2:52 PM

## 2021-10-21 NOTE — ANESTHESIA POSTPROCEDURE EVALUATION
Department of Anesthesiology  Postprocedure Note    Patient: Mignon Thomson  MRN: [id number]  YOB: 1970  Date of evaluation: 10/21/2021  Time:  3:06 PM     Procedure Summary     Date: 10/21/21 Room / Location: [address]    Anesthesia Start: 1309 Anesthesia Stop: 5563    Procedures:       ERCP STENT REMOVAL/EXCHANGE (N/A )      ERCP STONE REMOVAL      ERCP ENDOSCOPIC RETROGRADE CHOLANGIOPANCREATOGRAPHY DIRECT VISUALIZATION Diagnosis: (CHRONIC PANCREATITIS, STENT REMOVAL)    Surgeons: Daron Rosales MD Responsible Provider: Kt Stoner MD    Anesthesia Type: general ASA Status: 3          Anesthesia Type: general    Marcell Phase I: Marcell Score: 10    Marcell Phase II:      Last vitals: Reviewed and per EMR flowsheets.        Anesthesia Post Evaluation    Patient location during evaluation: PACU  Patient participation: complete - patient participated  Level of consciousness: awake  Pain score: 1  Airway patency: patent  Nausea & Vomiting: no nausea and no vomiting  Complications: no  Cardiovascular status: blood pressure returned to baseline and hemodynamically stable  Respiratory status: acceptable  Hydration status: euvolemic

## 2021-10-25 ENCOUNTER — TELEPHONE (OUTPATIENT)
Dept: PAIN MANAGEMENT | Age: 51
End: 2021-10-25

## 2021-10-25 NOTE — TELEPHONE ENCOUNTER
----- Message from Alicia Barclay MD sent at 10/13/2021  9:02 AM EDT -----  Please inform patient  Bone density scan showed osteoporosis  She need to be treated with prolia and other treatments to reduce the risk of future fractures  She need to follow up with pcp to discuss treatments

## 2021-10-25 NOTE — TELEPHONE ENCOUNTER
LVM requesting pt to call the office back to further discuss Bone density results and to discuss recommendations

## 2021-10-25 NOTE — TELEPHONE ENCOUNTER
Pt returned phone call and was notified of results and recommendations per Dr. Emily Naranjo.  Pt will call PCP to discuss options

## 2021-10-26 ENCOUNTER — TELEPHONE (OUTPATIENT)
Dept: GASTROENTEROLOGY | Age: 51
End: 2021-10-26

## 2021-10-26 NOTE — TELEPHONE ENCOUNTER
Pee Sinha from [place] [address] called in regards to referral for  for ESWL. Informed her that patient had Stent replacement and stone removal 4 days ago. Giulia Herrmann I would speak with Zena Gibson when he is back in office to see if this referral is still necessary.

## 2021-10-27 ENCOUNTER — TELEPHONE (OUTPATIENT)
Dept: FAMILY MEDICINE CLINIC | Age: 51
End: 2021-10-27

## 2021-10-27 ENCOUNTER — HOSPITAL ENCOUNTER (OUTPATIENT)
Age: 51
Setting detail: SPECIMEN
Discharge: HOME | End: 2021-10-27
Payer: MEDICARE

## 2021-10-27 DIAGNOSIS — D72.829 LEUKOCYTOSIS, UNSPECIFIED TYPE: ICD-10-CM

## 2021-10-27 DIAGNOSIS — E87.1 HYPONATREMIA: ICD-10-CM

## 2021-10-27 DIAGNOSIS — N39.41 URGE INCONTINENCE OF URINE: ICD-10-CM

## 2021-10-27 LAB
ABSOLUTE EOS #: <0.03 K/UL (ref 0–0.44)
ABSOLUTE IMMATURE GRANULOCYTE: 0.03 K/UL (ref 0–0.3)
ABSOLUTE LYMPH #: 2.49 K/UL (ref 1.1–3.7)
ABSOLUTE MONO #: 0.64 K/UL (ref 0.1–1.2)
ANION GAP SERPL CALCULATED.3IONS-SCNC: 15 MMOL/L (ref 9–17)
BASOPHILS # BLD: 0 % (ref 0–2)
BASOPHILS ABSOLUTE: <0.03 K/UL (ref 0–0.2)
BUN BLDV-MCNC: 8 MG/DL (ref 6–20)
BUN/CREAT BLD: ABNORMAL (ref 9–20)
CALCIUM SERPL-MCNC: 10 MG/DL (ref 8.6–10.4)
CHLORIDE BLD-SCNC: 96 MMOL/L (ref 98–107)
CO2: 24 MMOL/L (ref 20–31)
CREAT SERPL-MCNC: 0.46 MG/DL (ref 0.5–0.9)
DIFFERENTIAL TYPE: ABNORMAL
EOSINOPHILS RELATIVE PERCENT: 0 % (ref 1–4)
GFR AFRICAN AMERICAN: >60 ML/MIN
GFR NON-AFRICAN AMERICAN: >60 ML/MIN
GFR SERPL CREATININE-BSD FRML MDRD: ABNORMAL ML/MIN/{1.73_M2}
GFR SERPL CREATININE-BSD FRML MDRD: ABNORMAL ML/MIN/{1.73_M2}
GLUCOSE BLD-MCNC: 127 MG/DL (ref 70–99)
HCT VFR BLD CALC: 47.4 % (ref 36.3–47.1)
HEMOGLOBIN: 14.8 G/DL (ref 11.9–15.1)
IMMATURE GRANULOCYTES: 0 %
LYMPHOCYTES # BLD: 22 % (ref 24–43)
MCH RBC QN AUTO: 28.5 PG (ref 25.2–33.5)
MCHC RBC AUTO-ENTMCNC: 31.2 G/DL (ref 28.4–34.8)
MCV RBC AUTO: 91.2 FL (ref 82.6–102.9)
MONOCYTES # BLD: 6 % (ref 3–12)
NRBC AUTOMATED: 0 PER 100 WBC
PDW BLD-RTO: 12.9 % (ref 11.8–14.4)
PLATELET # BLD: 652 K/UL (ref 138–453)
PLATELET ESTIMATE: ABNORMAL
PMV BLD AUTO: 9.6 FL (ref 8.1–13.5)
POTASSIUM SERPL-SCNC: 4.6 MMOL/L (ref 3.7–5.3)
RBC # BLD: 5.2 M/UL (ref 3.95–5.11)
RBC # BLD: ABNORMAL 10*6/UL
SEG NEUTROPHILS: 72 % (ref 36–65)
SEGMENTED NEUTROPHILS ABSOLUTE COUNT: 7.94 K/UL (ref 1.5–8.1)
SODIUM BLD-SCNC: 135 MMOL/L (ref 135–144)
WBC # BLD: 11.1 K/UL (ref 3.5–11.3)
WBC # BLD: ABNORMAL 10*3/UL

## 2021-10-27 RX ORDER — ONDANSETRON 4 MG/1
4 TABLET, ORALLY DISINTEGRATING ORAL EVERY 8 HOURS PRN
Qty: 10 TABLET | Refills: 0 | Status: SHIPPED | OUTPATIENT
Start: 2021-10-27 | End: 2022-01-05 | Stop reason: ALTCHOICE

## 2021-10-27 NOTE — TELEPHONE ENCOUNTER
1. Patient complaining of nausea x3 days and would like something for it    2.  Asking for test results

## 2021-11-01 ENCOUNTER — OFFICE VISIT (OUTPATIENT)
Dept: FAMILY MEDICINE CLINIC | Age: 51
End: 2021-11-01
Payer: MEDICARE

## 2021-11-01 VITALS
WEIGHT: 130.8 LBS | OXYGEN SATURATION: 96 % | HEART RATE: 91 BPM | DIASTOLIC BLOOD PRESSURE: 80 MMHG | TEMPERATURE: 97.9 F | BODY MASS INDEX: 23.17 KG/M2 | SYSTOLIC BLOOD PRESSURE: 132 MMHG

## 2021-11-01 DIAGNOSIS — K86.0 ALCOHOL-INDUCED CHRONIC PANCREATITIS (HCC): ICD-10-CM

## 2021-11-01 DIAGNOSIS — J44.1 CHRONIC OBSTRUCTIVE PULMONARY DISEASE WITH ACUTE EXACERBATION (HCC): ICD-10-CM

## 2021-11-01 DIAGNOSIS — Z12.31 ENCOUNTER FOR SCREENING MAMMOGRAM FOR BREAST CANCER: Primary | ICD-10-CM

## 2021-11-01 DIAGNOSIS — M80.80XA OTHER OSTEOPOROSIS WITH CURRENT PATHOLOGICAL FRACTURE, INITIAL ENCOUNTER: ICD-10-CM

## 2021-11-01 DIAGNOSIS — F33.2 MAJOR DEPRESSIVE DISORDER, RECURRENT SEVERE WITHOUT PSYCHOTIC FEATURES (HCC): ICD-10-CM

## 2021-11-01 DIAGNOSIS — E87.1 HYPONATREMIA: ICD-10-CM

## 2021-11-01 DIAGNOSIS — S22.050D COMPRESSION FRACTURE OF T5 VERTEBRA WITH ROUTINE HEALING, SUBSEQUENT ENCOUNTER: ICD-10-CM

## 2021-11-01 PROCEDURE — G8420 CALC BMI NORM PARAMETERS: HCPCS | Performed by: INTERNAL MEDICINE

## 2021-11-01 PROCEDURE — 99214 OFFICE O/P EST MOD 30 MIN: CPT | Performed by: INTERNAL MEDICINE

## 2021-11-01 PROCEDURE — 4004F PT TOBACCO SCREEN RCVD TLK: CPT | Performed by: INTERNAL MEDICINE

## 2021-11-01 PROCEDURE — G8926 SPIRO NO PERF OR DOC: HCPCS | Performed by: INTERNAL MEDICINE

## 2021-11-01 PROCEDURE — 1111F DSCHRG MED/CURRENT MED MERGE: CPT | Performed by: INTERNAL MEDICINE

## 2021-11-01 PROCEDURE — G8482 FLU IMMUNIZE ORDER/ADMIN: HCPCS | Performed by: INTERNAL MEDICINE

## 2021-11-01 PROCEDURE — 3017F COLORECTAL CA SCREEN DOC REV: CPT | Performed by: INTERNAL MEDICINE

## 2021-11-01 PROCEDURE — G8427 DOCREV CUR MEDS BY ELIG CLIN: HCPCS | Performed by: INTERNAL MEDICINE

## 2021-11-01 PROCEDURE — 3023F SPIROM DOC REV: CPT | Performed by: INTERNAL MEDICINE

## 2021-11-01 RX ORDER — PREDNISONE 10 MG/1
TABLET ORAL
Qty: 30 TABLET | Refills: 0 | Status: SHIPPED | OUTPATIENT
Start: 2021-11-01 | End: 2021-12-18 | Stop reason: ALTCHOICE

## 2021-11-01 RX ORDER — DENOSUMAB 60 MG/ML
60 INJECTION SUBCUTANEOUS
Qty: 1 ML | Refills: 1 | Status: SHIPPED | OUTPATIENT
Start: 2021-11-01 | End: 2022-05-04 | Stop reason: SDUPTHER

## 2021-11-01 ASSESSMENT — ENCOUNTER SYMPTOMS
SHORTNESS OF BREATH: 0
COUGH: 0
NAUSEA: 0
BLOOD IN STOOL: 0
CHOKING: 0
CHEST TIGHTNESS: 0
ANAL BLEEDING: 0
DIARRHEA: 0
CONSTIPATION: 0
WHEEZING: 0
VOMITING: 0
ABDOMINAL PAIN: 0

## 2021-11-01 ASSESSMENT — VISUAL ACUITY: OU: 1

## 2021-11-01 NOTE — PROGRESS NOTES
Pt is here to discuss medications. She would like to stop some of these med's. She did not take for a week due to being sick. She states bowels have been normal and not black. She was told has osteoporosis and was suggested she have PCP prescribe Prolia.   She is still using oxygen at night and now and then during the day

## 2021-11-01 NOTE — PROGRESS NOTES
Subjective:       Patient ID:     Gayle Laws is a 45 y.o. female who presents for   Chief Complaint   Patient presents with    Medication Check    Results     wants to go over labs       HPI:  Nursing note reviewed and discussed with patient. Has been to see nephrology due to hyponatremia. States was told her psych meds might be causing it. She has been sick the past week, nauseous and vomiting. Still adjusting to the gastroparesis diet - learning what she can tolerate. She is getting new partials soon which will help with the chewing as well. She has been nauseous for one week today, has not taken any medications this past week  Breathing has been harder this past week - using oxygen more. Definitely getting symbicort once daily - not sure if she is using it twice daily. She has needed her oxygen a couple times a day this week because her breathing is so hard sometimes. Continues to be frustrated with the number of medications she takes. Would like to start cutting back on that   She had pancreatic stent replaced per GI, doing well. Her diarrhea has been better since stopping ferrous sulfate this week. Would like to see if she can stop it altogether         Patient's medications, allergies, past medical, surgical, social and family histories were reviewed and updated as appropriate.     Past Medical History:   Diagnosis Date    Acute respiratory failure with hypoxia ([address] 10/16/2020    Alcohol withdrawal syndrome, with delirium ([address] 12/14/2019    Alcoholism ([address]     Anemia 10/2020    GI bleed    Anxiety     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24, the patient underwent 2 surgical resections without known recurrence 10/23/2020    Closed fracture of lateral portion of left tibial plateau 61/75/1864    COPD (chronic obstructive pulmonary disease) ([address]     CO2 retainer, on Bipap at night for this, Dr. Alpa Mccartney ( last visit 11/20/2020 and note on chart )    Depression     bipolar, major depressive disorder, ptsd, anxiety    Dysphagia     GI bleed 10/2020    Hypertension     Memory loss     Oxygen dependent     USES AT NIGHT - 3L/MIN    Pain, joint, ankle and foot     Pancreatic lesion 10/2020    Dr. Krunal Diamond working up pt    Peripheral neuropathy     Seizures ([address]     LAST SEIZURE 3/2020 - FEELS IT WAS FROM ALCOHOL WITHDRAWL    Tension headache     Under care of team 09/29/2021    [facility]    Under care of team 09/29/2021    pain management-Hamzah Martines[facility] [place]-last visit sep 2021    Under care of team 09/29/2021    pulmonology-Dr [facility]-due for visit oct 26/2021    Under care of team 09/29/2021    psych-bahnfeldt NP-telemed-last visit 10/ 2021    Under care of team 09/29/2021    hd-[address]-last visit aug 2021    Under care of team     NEPHROLOGY - DR. CHAPMAN     Wears glasses     Wears partial dentures     UPPER    Wellness examination 09/29/2021    pcp-Ludivina grimes-last visit june 2021     Past Surgical History:   Procedure Laterality Date    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    COLONOSCOPY N/A 10/22/2020    COLONOSCOPY DIAGNOSTIC performed by Linda Kingston MD at [address] [place] [address]  07/28/2021    CT ABSCESS DRAIN SUBCUTANEOUS 7/28/2021 [facility] CT SCAN    ENDOSCOPIC ULTRASOUND (LOWER) N/A 12/09/2020    ENDOSCOPIC ULTRASOUND, UPPER WITH LINEAR SCOPE FOR BIOPSY OF MASS ON HEAD OF PANCREAS performed by Angelito Alcaraz MD at [facility] ERCP  08/11/2021    ERCP N/A 08/11/2021    ERCP STENT INSERTION performed by Delphine Johnson MD at [facility]"" Endoscopy    ERCP  08/11/2021    ERCP SPHINCTER/PAPILLOTOMY performed by Delphine Johnson MD at \[facility]"" Endoscopy    ERCP  10/21/2021    ERCP STONE REMOVAL    ERCP N/A 10/21/2021    ERCP STENT REMOVAL/EXCHANGE performed by Delphine Johnson MD at [address] [place] [address] ERCP  10/21/2021    ERCP STONE REMOVAL performed by Juany Skaggs MD at [facility] ERCP  10/21/2021    ERCP ENDOSCOPIC RETROGRADE CHOLANGIOPANCREATOGRAPHY DIRECT VISUALIZATION performed by Juany Skaggs MD at [facility] Left 07/03/2013    ORIF tibial plateau    FRACTURE SURGERY Right     small finger metacarpal fracture    HAND SURGERY      pins    HYSTERECTOMY  2003    SPINE SURGERY N/A 09/10/2021    KYPHOPLASTY lumbar L5 performed by Mai Hinojosa MD at [address] N/A 10/22/2020    EGD BIOPSY performed by Praneeth Maya MD at [facility] N/A 04/05/2021    EGD BIOPSY performed by Praneeth Maya MD at [address] [place] [address] N/A 09/08/2021    ENDOSCOPIC ULTRASOUND, LINEAR SCOPE performed by Yunior Boykin MD at [facility]       Social History     Tobacco Use    Smoking status: Current Every Day Smoker     Packs/day: 1.00     Years: 30.00     Pack years: 30.00     Types: Cigarettes    Smokeless tobacco: Never Used   Substance Use Topics    Alcohol use: Not Currently     Alcohol/week: 0.0 standard drinks      Patient Active Problem List   Diagnosis    Acute bronchitis    Reflex sympathetic dystrophy of lower limb    Essential hypertension    Nicotine addiction    Psychophysiological insomnia    Anxiety    Alcoholic peripheral neuropathy (HCC)    Hypokalemia    Macrocytic anemia    Hypomagnesemia    Tobacco use    Ambulatory dysfunction    Seizure disorder ([address]    COPD with acute exacerbation ([address]    Paresthesia of lower extremity    Acute respiratory failure with hypoxia (HCC)    Pancreatic cyst    Recurrent major depressive disorder (HCC)    Elevated CA 19-9 level    Perineal mass, female    Esophagitis candidal     Condyloma    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24, the patient underwent 2 surgical resections without known recurrence    Alcohol use disorder, severe, in early remission ([address]    Metabolic encephalopathy    AMAN (generalized anxiety disorder)    Major depressive disorder, recurrent severe without psychotic features [facility]    Esophageal dysphagia    Chronic peripheral neuropathic pain    History of alcohol abuse    History of petit-mal seizures    Intractable episodic tension-type headache    Personal history of astrocytoma    Multilevel spine pain    Chronic pain syndrome    Marijuana abuse    Severe sepsis (HCC)    Closed fracture of fifth thoracic vertebra (HCC)    Diverticulitis of large intestine with abscess without bleeding    Elevated brain natriuretic peptide (BNP) level    Elevated alkaline phosphatase level    Chronic pancreatitis (HCC)    Erythema ab igne    Compression fracture of thoracic vertebra (HCC)    Pathological compression fracture of lumbar vertebra with delayed healing    Metabolic acidosis with increased anion gap and accumulation of organic acids    Community acquired pneumonia of left lower lobe of lung    Septicemia ([facility]    Alcohol-induced acute pancreatitis    Fluid collection of pancreas    Diverticulitis of large intestine without perforation or abscess with bleeding    Pseudocyst of pancreas    Bandemia    Sepsis ([facility]    Acute recurrent pancreatitis    Atelectasis of left lung    Hyponatremia    Iron deficiency anemia         Prior to Visit Medications    Medication Sig Taking? Authorizing Provider   ondansetron (ZOFRAN ODT) 4 MG disintegrating tablet Take 1 tablet by mouth every 8 hours as needed for Nausea or Vomiting Yes Ludivina Daniel MD   sodium chloride 1 g tablet Take 1 tablet by mouth 4 times daily Yes Queta Patton MD   CREON 10521-52878 units delayed release capsule TAKE ONE CAPSULE BY MOUTH THREE TIMES A DAY WITH MEALS Yes Lazaro Morris MD   pregabalin (LYRICA) 200 MG capsule Take 1 capsule by mouth 3 times daily for 180 days.  Yes Zayda Bell MD   acamprosate (CAMPRAL) 333 MG tablet TAKE TWO TABLETS BY MOUTH THREE TIMES A DAY Yes LOI Gilliland NP   carBAMazepine (TEGRETOL) 200 MG tablet TAKE ONE TABLET BY MOUTH THREE TIMES A DAY Yes Ludivina Umana MD   topiramate (TOPAMAX) 50 MG tablet TAKE ONE TABLET BY MOUTH TWICE A DAY Yes Ludivina Umana MD   busPIRone (BUSPAR) 30 MG tablet TAKE ONE TABLET BY MOUTH TWICE A DAY WITH A MEAL Yes LOI Gilliland NP   spironolactone (ALDACTONE) 50 MG tablet TAKE ONE TABLET BY MOUTH DAILY Yes Ludivina Umana MD   amLODIPine (NORVASC) 5 MG tablet TAKE ONE TABLET BY MOUTH DAILY Yes Ludivina Umana MD   escitalopram (LEXAPRO) 5 MG tablet TAKE ONE TABLET BY MOUTH DAILY Yes LOI Gilliland NP   KLOR-CON M20 20 MEQ extended release tablet TAKE ONE TABLET BY MOUTH DAILY Yes Ludivina Umana MD   traMADol (ULTRAM) 50 MG tablet as needed.   Yes Genevieve Azar DDS   DULoxetine (CYMBALTA) 60 MG extended release capsule Take 60 mg by mouth daily  Yes Laisha Newby MD   simethicone (MYLICON) 80 MG chewable tablet Take 1 tablet by mouth 4 times daily as needed for Flatulence Yes Refugio Henning MD   hydrOXYzine (ATARAX) 25 MG tablet Take 1 tablet by mouth 3 times daily as needed for Anxiety  Patient taking differently: Take 25 mg by mouth 3 times daily  Yes LOI Gilliland NP   metoclopramide (REGLAN) 5 MG tablet Take 1 tablet by mouth 3 times daily Yes Refugio Henning MD   omeprazole (PRILOSEC) 40 MG delayed release capsule Take 1 capsule by mouth 2 times daily Yes Refugio Henning MD   traZODone (DESYREL) 50 MG tablet Take 1 tablet by mouth nightly as needed for Sleep Yes LOI Gilliland NP   baclofen (LIORESAL) 10 MG tablet Take 1 tablet by mouth 3 times daily  Patient taking differently: Take 10 mg by mouth as needed  Yes Ludivina Umana MD   ferrous sulfate (IRON 325) 325 (65 Fe) MG tablet Take 1 tablet by mouth 2 times daily Yes Ludivina Umana MD   rOPINIRole (REQUIP) 1 MG tablet Take 1 tablet by mouth nightly Yes Ludivina Umana MD   budesonide-formoterol (SYMBICORT) 160-4.5 MCG/ACT AERO Inhale 2 puffs into the lungs 2 times daily Yes Ludivina Jaimes MD   tiotropium (SPIRIVA RESPIMAT) 2.5 MCG/ACT AERS inhaler Inhale 2 puffs into the lungs daily Yes Evelio Poole MD   albuterol sulfate HFA (VENTOLIN HFA) 108 (90 Base) MCG/ACT inhaler Inhale 2 puffs into the lungs 4 times daily as needed for Wheezing Yes Jess Chakraborty MD   vitamin D (ERGOCALCIFEROL) 08963 units CAPS capsule Take 1 capsule by mouth once a week Yes Jess Chakraborty MD   folic acid (FOLVITE) 1 MG tablet Take 1 tablet by mouth daily Yes Jess Chakraborty MD   zoster recombinant adjuvanted vaccine [facility]) 50 MCG/0.5ML SUSR injection Inject 0.5 mLs into the muscle See Admin Instructions 1 dose now and repeat in 2-6 months  Patient not taking: Reported on 11/1/2021  Jess Chakraborty MD     Review of Systems  Review of Systems   Constitutional: Negative for fatigue, fever and unexpected weight change. Respiratory: Negative for cough, choking, chest tightness, shortness of breath and wheezing. Cardiovascular: Negative for chest pain, palpitations and leg swelling. Gastrointestinal: Negative for abdominal pain, anal bleeding, blood in stool, constipation, diarrhea, nausea and vomiting. Endocrine: Negative. Musculoskeletal: Negative for joint swelling and myalgias. Skin: Negative. Neurological: Negative for dizziness. Psychiatric/Behavioral: Negative for sleep disturbance. All other systems reviewed and are negative. Objective:       Physical Exam:  /80 (Site: Left Upper Arm, Position: Sitting, Cuff Size: Medium Adult)   Pulse 91   Temp 97.9 °F (36.6 °C) (Temporal)   Wt 130 lb 12.8 oz (59.3 kg)   SpO2 96%   BMI 23.17 kg/m²   Physical Exam  Vitals and nursing note reviewed. Constitutional:       General: She is not in acute distress. Appearance: She is well-developed. She is not ill-appearing.    Eyes:      General: Lids are normal. Vision grossly intact. Cardiovascular:      Rate and Rhythm: Normal rate and regular rhythm. Heart sounds: Normal heart sounds, S1 normal and S2 normal. No murmur heard. No friction rub. No gallop. Pulmonary:      Effort: Pulmonary effort is normal. No respiratory distress. Breath sounds: Normal breath sounds. No wheezing. Abdominal:      General: Bowel sounds are normal.      Palpations: Abdomen is soft. There is no mass. Tenderness: There is no abdominal tenderness. There is no guarding. Musculoskeletal:         General: Normal range of motion. Skin:     General: Skin is warm and dry. Capillary Refill: Capillary refill takes less than 2 seconds. Neurological:      General: No focal deficit present. Mental Status: She is alert and oriented to person, place, and time.          Data Review  Hospital Outpatient Visit on 10/27/2021   Component Date Value    WBC 10/27/2021 11.1     RBC 10/27/2021 5.20*    Hemoglobin 10/27/2021 14.8     Hematocrit 10/27/2021 47.4*    MCV 10/27/2021 91.2     MCH 10/27/2021 28.5     MCHC 10/27/2021 31.2     RDW 10/27/2021 12.9     Platelets 97/95/8214 652*    MPV 10/27/2021 9.6     NRBC Automated 10/27/2021 0.0     Differential Type 10/27/2021 NOT REPORTED     Seg Neutrophils 10/27/2021 72*    Lymphocytes 10/27/2021 22*    Monocytes 10/27/2021 6     Eosinophils % 10/27/2021 0*    Basophils 10/27/2021 0     Immature Granulocytes 10/27/2021 0     Segs Absolute 10/27/2021 7.94     Absolute Lymph # 10/27/2021 2.49     Absolute Mono # 10/27/2021 0.64     Absolute Eos # 10/27/2021 <0.03     Basophils Absolute 10/27/2021 <0.03     Absolute Immature Granul* 10/27/2021 0.03     WBC Morphology 10/27/2021 NOT REPORTED     RBC Morphology 10/27/2021 NOT REPORTED     Platelet Estimate 62/52/6634  [facility] Outpatient Visit on 10/27/2021   Component Date Value    Glucose 10/27/2021 127*    BUN 10/27/2021 8     CREATININE 10/27/2021 0.46*    Bun/Cre Ratio 10/27/2021 NOT REPORTED     Calcium 10/27/2021 10.0     Sodium 10/27/2021 135     Potassium 10/27/2021 4.6     Chloride 10/27/2021 96*    CO2 10/27/2021 24     Anion Gap 10/27/2021 15     GFR Non- 10/27/2021 >60     GFR  10/27/2021 >60     GFR Comment 10/27/2021          GFR Staging 10/27/2021 NOT REPORTED    Admission on 10/21/2021, Discharged on 10/21/2021   Component Date Value    POC Creatinine 10/21/2021 0.45*    GFR Comment 10/21/2021 >60     GFR Non- 10/21/2021 >60     GFR Comment 10/21/2021          POC Sodium 10/21/2021 129*    POC Potassium 10/21/2021 3.9     POC Glucose 10/21/2021 141*   Hospital Outpatient Visit on 10/19/2021   Component Date Value    Sodium 10/19/2021 128*   Hospital Outpatient Visit on 10/15/2021   Component Date Value    Vitamin B6 10/15/2021 8.3*   Admission on 10/12/2021, Discharged on 10/12/2021   Component Date Value    WBC 10/12/2021 13.5*    RBC 10/12/2021 4.49     Hemoglobin 10/12/2021 13.0     Hematocrit 10/12/2021 39.9     MCV 10/12/2021 88.9     MCH 10/12/2021 29.0     MCHC 10/12/2021 32.6     RDW 10/12/2021 13.2     Platelets 45/19/6098 462*    MPV 10/12/2021 9.9     NRBC Automated 10/12/2021 0.0     Differential Type 10/12/2021 NOT REPORTED     Seg Neutrophils 10/12/2021 65     Lymphocytes 10/12/2021 27     Monocytes 10/12/2021 7     Eosinophils % 10/12/2021 1     Basophils 10/12/2021 0     Immature Granulocytes 10/12/2021 0     Segs Absolute 10/12/2021 8.66*    Absolute Lymph # 10/12/2021 3.69     Absolute Mono # 10/12/2021 0.93     Absolute Eos # 10/12/2021 0.08     Basophils Absolute 10/12/2021 0.03     Absolute Immature Granul* 10/12/2021 0.06     WBC Morphology 10/12/2021 NOT REPORTED     RBC Morphology 10/12/2021 NOT REPORTED     Platelet Estimate 63/15/9146 NOT REPORTED     Color, UA 10/12/2021 Yellow     Turbidity UA 10/12/2021 Clear     Glucose, Ur 10/12/2021 NEGATIVE     Bilirubin Urine 10/12/2021 NEGATIVE     Ketones, Urine 10/12/2021 NEGATIVE     Specific [place], UA 10/12/2021 1.009     Urine Hgb 10/12/2021 NEGATIVE     pH, UA 10/12/2021 8.0     Protein, UA 10/12/2021 NEGATIVE     Urobilinogen, Urine 10/12/2021 Normal     Nitrite, Urine 10/12/2021 NEGATIVE     Leukocyte Esterase, Urine 10/12/2021 NEGATIVE     - 10/12/2021          WBC, UA 10/12/2021 None     RBC, UA 10/12/2021 None     Casts UA 10/12/2021 NOT REPORTED     Crystals, UA 10/12/2021 NOT REPORTED     Epithelial Cells UA 10/12/2021 None     Renal Epithelial, UA 10/12/2021 NOT REPORTED     Bacteria, UA 10/12/2021 NOT REPORTED     Mucus, UA 10/12/2021 NOT REPORTED     Trichomonas, UA 10/12/2021 NOT REPORTED     Amorphous, UA 10/12/2021 NOT REPORTED     Other Observations UA 10/12/2021 NOT REPORTED     Yeast, UA 10/12/2021 NOT REPORTED     Specimen Source 10/12/2021 . BLOOD     Ordered Test 10/12/2021 BMP, MG     Reason for Rejection 10/12/2021 Unable to perform testing: Specimen hemolyzed.  - 10/12/2021 NOT REPORTED     Serum Osmolality 10/12/2021 261*    Specimen Source 10/12/2021 . BLOOD     Ordered Test 10/12/2021  BMP MG     Reason for Rejection 10/12/2021 Unable to perform testing: Specimen hemolyzed.      - 10/12/2021 NOT REPORTED     Glucose 10/12/2021 104*    BUN 10/12/2021 4*    CREATININE 10/12/2021 0.31*    Bun/Cre Ratio 10/12/2021 NOT REPORTED     Calcium 10/12/2021 9.4     Sodium 10/12/2021 126*    Potassium 10/12/2021 4.7     Chloride 10/12/2021 92*    CO2 10/12/2021 21     Anion Gap 10/12/2021 13     GFR Non- 10/12/2021 >60     GFR  10/12/2021 >60     GFR Comment 10/12/2021          GFR Staging 10/12/2021 NOT REPORTED     Magnesium 10/12/2021 1.6     Osmolality, Ur 10/12/2021 301     Sodium,Ur 10/12/2021 87     Albumin 10/12/2021 3.8     Alkaline Phosphatase 10/12/2021 211*    ALT 10/12/2021 7     AST 10/12/2021 14     Total Bilirubin 10/12/2021 0.19*    Bilirubin, Direct 10/12/2021 <0.08     Bilirubin, Indirect 10/12/2021 CANNOT BE CALCULATED     Total Protein 10/12/2021 7.3     Globulin 10/12/2021 NOT REPORTED     Albumin/Globulin Ratio 10/12/2021 1.1     Ethanol 10/12/2021 <10     Ethanol percent 10/12/2021 <0.010    Hospital Outpatient Visit on 10/11/2021   Component Date Value    Magnesium 10/11/2021 1.6     Glucose 10/11/2021 105*    BUN 10/11/2021 5*    CREATININE 10/11/2021 0.48*    Bun/Cre Ratio 10/11/2021 NOT REPORTED     Calcium 10/11/2021 9.0     Sodium 10/11/2021 123*    Potassium 10/11/2021 4.4     Chloride 10/11/2021 92*    CO2 10/11/2021 23     Anion Gap 10/11/2021 8*    GFR Non- 10/11/2021 >60     GFR  10/11/2021 >60     GFR Comment 10/11/2021          GFR Staging 10/11/2021  [facility] Outpatient Visit on 10/08/2021   Component Date Value    Glucose 10/08/2021 101*    BUN 10/08/2021 7     CREATININE 10/08/2021 0.42*    Bun/Cre Ratio 10/08/2021 NOT REPORTED     Calcium 10/08/2021 9.4     Sodium 10/08/2021 130*    Potassium 10/08/2021 4.5     Chloride 10/08/2021 92*    CO2 10/08/2021 26     Anion Gap 10/08/2021 12     GFR Non- 10/08/2021 >60     GFR  10/08/2021 >60     GFR Comment 10/08/2021          GFR Staging 10/08/2021 NOT REPORTED     Sodium,Ur 10/08/2021 74     Osmolality, Ur 10/08/2021 485    Hospital Outpatient Visit on 10/05/2021   Component Date Value    Sodium 10/05/2021 125*   Hospital Outpatient Visit on 10/03/2021   Component Date Value    Glucose 10/03/2021 77     BUN 10/03/2021 8     CREATININE 10/03/2021 0.45*    Bun/Cre Ratio 10/03/2021 NOT REPORTED     Calcium 10/03/2021 9.2     Sodium 10/03/2021 131*    Potassium 10/03/2021 5.4*    Chloride 10/03/2021 95*    CO2 10/03/2021 24     Anion Gap 10/03/2021 12     GFR Non- 10/03/2021 >60     GFR  10/03/2021 >60     GFR Comment 10/03/2021          GFR Staging 10/03/2021 NOT REPORTED    There may be more visits with results that are not included. Assessment/Plan:      1. Chronic obstructive pulmonary disease with acute exacerbation ([facility]    - fluticasone-umeclidin-vilant (TRELEGY ELLIPTA) 100-62.5-25 MCG/INH AEPB; Inhale 1 puff into the lungs daily  Dispense: 1 each; Refill: 5  - predniSONE (DELTASONE) 10 MG tablet; 4 tabs by mouth daily for 3 days, then 3 tabs daily for 3 days, then 2 tabs daily for 3 days, then 1 tab daily till gone. Dispense: 30 tablet; Refill: 0    2. Hyponatremia  meds reviewed - SSRI most likely cause of hyponatremia   Pt to review with psych - has visit coming up   Continue sodium supplementation, f/u nephrology     3. Other osteoporosis with current pathological fracture, initial encounter  - denosumab (PROLIA) 60 MG/ML SOSY SC injection; Inject 1 mL into the skin every 6 months  Dispense: 1 mL; Refill: 1    4. Major depressive disorder, recurrent severe without psychotic features ([facility]  Continue current regimen, f/u psychiatry     5. Encounter for screening mammogram for breast cancer  - [facility] Digital Screen Bilateral [[id number]]; Future    6. Alcohol-induced chronic pancreatitis [facility])  F/u gastroenterology     7.  Compression fracture of T5 vertebra with routine healing, subsequent encounter  S/p kyphoplasty            Health Maintenance Due   Topic Date Due    Shingles Vaccine (1 of 2) Never done    COVID-19 Vaccine (3 - Pfizer booster) 10/23/2021    Breast cancer screen  11/06/2021       Electronically signed by Nadine Valdes MD on 11/1/2021 at 2:41 PM

## 2021-11-03 ENCOUNTER — HOSPITAL ENCOUNTER (OUTPATIENT)
Age: 51
Discharge: HOME | End: 2021-11-03
Payer: MEDICARE

## 2021-11-03 ENCOUNTER — HOSPITAL ENCOUNTER (OUTPATIENT)
Dept: PSYCHIATRY | Age: 51
Setting detail: THERAPIES SERIES
Discharge: HOME | End: 2021-11-03
Payer: MEDICARE

## 2021-11-03 LAB
ANION GAP SERPL CALCULATED.3IONS-SCNC: 12 MMOL/L (ref 9–17)
BUN BLDV-MCNC: 9 MG/DL (ref 6–20)
BUN/CREAT BLD: ABNORMAL (ref 9–20)
CALCIUM SERPL-MCNC: 9.3 MG/DL (ref 8.6–10.4)
CHLORIDE BLD-SCNC: 97 MMOL/L (ref 98–107)
CO2: 23 MMOL/L (ref 20–31)
CREAT SERPL-MCNC: 0.53 MG/DL (ref 0.5–0.9)
GFR AFRICAN AMERICAN: >60 ML/MIN
GFR NON-AFRICAN AMERICAN: >60 ML/MIN
GFR SERPL CREATININE-BSD FRML MDRD: ABNORMAL ML/MIN/{1.73_M2}
GFR SERPL CREATININE-BSD FRML MDRD: ABNORMAL ML/MIN/{1.73_M2}
GLUCOSE BLD-MCNC: 77 MG/DL (ref 70–99)
POTASSIUM SERPL-SCNC: 5.4 MMOL/L (ref 3.7–5.3)
SODIUM BLD-SCNC: 132 MMOL/L (ref 135–144)

## 2021-11-03 PROCEDURE — 80048 BASIC METABOLIC PNL TOTAL CA: CPT

## 2021-11-03 PROCEDURE — 90837 PSYTX W PT 60 MINUTES: CPT | Performed by: SOCIAL WORKER

## 2021-11-04 NOTE — PSYCHOTHERAPY
mild distress  Appetite normal  Sleep disturbance Yes  Fatigue Yes  Loss of pleasure No  Impulsive behavior No  Speech    normal rate, normal volume and well articulated  Mood    Worthless  Low self-esteem  Affect    depressed affect  Thought Content    hopelessness, helplessness and worthlessness  Thought Process    linear, goal directed and coherent  Associations    logical connections  Insight    Good  Judgment    Intact  Orientation    oriented to person, place, time, and general circumstances  Memory    recent and remote memory intact  Attention/Concentration    intact  Morbid ideation No  Suicide Assessment    no suicidal ideation    A:    Pt presented to session as depressed and exhibiting feelings of worthlessness. Pt was tearful throughout session and expressed anxiety when she was confused. Pt appears to have a lot of jumbled thoughts and struggles to stay on topic, and becomes anxious and worried when she is not sure what is going on with herself. Pt was able to explore her thoughts to identify some cognitive distortions she engages in. Pt will continue to benefit from ongoing therapy to identify and address cognitive distortions as well as trauma symptoms.      Visit Diagnosis:   Post Traumatic Stress Disorder       History from Medical Record:        Diagnosis Date    Acute respiratory failure with hypoxia ([facility] 10/16/2020    Alcohol withdrawal syndrome, with delirium ([facility] 12/14/2019    Alcoholism ([facility]     Anemia 10/2020    GI bleed    Anxiety     Astrocytoma ([facility] - 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24, the patient underwent 2 surgical resections without known recurrence 10/23/2020    Closed fracture of lateral portion of left tibial plateau 53/75/2844    COPD (chronic obstructive pulmonary disease) ([facility]     CO2 retainer, on Bipap at night for this, Dr. Jed Prieto ( last visit 11/20/2020 and note on chart )    Depression     bipolar, major depressive disorder, ptsd, anxiety    Dysphagia     GI bleed 10/2020    Hypertension     Memory loss     Oxygen dependent     USES AT NIGHT - 3L/MIN    Pain, joint, ankle and foot     Pancreatic lesion 10/2020    Dr. Tubbs Dear working up pt    Peripheral neuropathy     Seizures ([address]     LAST SEIZURE 3/2020 - FEELS IT WAS FROM ALCOHOL WITHDRAWL    Tension headache     Under care of team 09/29/2021    neuro-Dr Coyle-[facility]-last visit sep 2021    Under care of team 09/29/2021    pain management-Hamzah Martines-[facility]-last visit sep 2021    Under care of team 09/29/2021    pulmonology-Dr Dueñas-[facility]-due for visit oct 26/2021    Under care of team 09/29/2021    psych-bahnfeldt NP-telemed-last visit 10/ 2021    Under care of team 09/29/2021    [address]-last visit aug 2021    Under care of team     NEPHROLOGY - DR. CHAPMAN  Wears glasses     Wears partial dentures     UPPER    Wellness examination 09/29/2021    pcp-Ludivina Grimm[facility]-last visit june 2021     Medications:   Current Outpatient Medications   Medication Sig Dispense Refill    fluticasone-umeclidin-vilant (TRELEGY ELLIPTA) 100-62.5-25 MCG/INH AEPB Inhale 1 puff into the lungs daily 1 each 5    predniSONE (DELTASONE) 10 MG tablet 4 tabs by mouth daily for 3 days, then 3 tabs daily for 3 days, then 2 tabs daily for 3 days, then 1 tab daily till gone. 30 tablet 0    denosumab (PROLIA) 60 MG/ML SOSY SC injection Inject 1 mL into the skin every 6 months 1 mL 1    ondansetron (ZOFRAN ODT) 4 MG disintegrating tablet Take 1 tablet by mouth every 8 hours as needed for Nausea or Vomiting 10 tablet 0    sodium chloride 1 g tablet Take 1 tablet by mouth 4 times daily 120 tablet 3    CREON 29132-57989 units delayed release capsule TAKE ONE CAPSULE BY MOUTH THREE TIMES A DAY WITH MEALS 90 capsule 1    pregabalin (LYRICA) 200 MG capsule Take 1 capsule by mouth 3 times daily for 180 days.  90 capsule 5    carBAMazepine (TEGRETOL) 200 MG tablet TAKE ONE TABLET BY MOUTH THREE TIMES A DAY 90 tablet 0    topiramate (TOPAMAX) 50 MG tablet TAKE ONE TABLET BY MOUTH TWICE A  tablet 1    busPIRone (BUSPAR) 30 MG tablet TAKE ONE TABLET BY MOUTH TWICE A DAY WITH A MEAL 60 tablet 0    spironolactone (ALDACTONE) 50 MG tablet TAKE ONE TABLET BY MOUTH DAILY 90 tablet 1    amLODIPine (NORVASC) 5 MG tablet TAKE ONE TABLET BY MOUTH DAILY 90 tablet 1    escitalopram (LEXAPRO) 5 MG tablet TAKE ONE TABLET BY MOUTH DAILY 30 tablet 1    KLOR-CON M20 20 MEQ extended release tablet TAKE ONE TABLET BY MOUTH DAILY 90 tablet 1    DULoxetine (CYMBALTA) 60 MG extended release capsule Take 60 mg by mouth daily       simethicone (MYLICON) 80 MG chewable tablet Take 1 tablet by mouth 4 times daily as needed for Flatulence 180 tablet 3    hydrOXYzine (ATARAX) 25 MG tablet Take 1 tablet by mouth 3 times daily as needed for Anxiety (Patient taking differently: Take 25 mg by mouth 3 times daily ) 90 tablet 3    metoclopramide (REGLAN) 5 MG tablet Take 1 tablet by mouth 3 times daily 120 tablet 2    omeprazole (PRILOSEC) 40 MG delayed release capsule Take 1 capsule by mouth 2 times daily 60 capsule 2    traZODone (DESYREL) 50 MG tablet Take 1 tablet by mouth nightly as needed for Sleep 30 tablet 0    rOPINIRole (REQUIP) 1 MG tablet Take 1 tablet by mouth nightly 90 tablet 1    budesonide-formoterol (SYMBICORT) 160-4.5 MCG/ACT AERO Inhale 2 puffs into the lungs 2 times daily 3 Inhaler 1    tiotropium (SPIRIVA RESPIMAT) 2.5 MCG/ACT AERS inhaler Inhale 2 puffs into the lungs daily 1 Inhaler 11    albuterol sulfate HFA (VENTOLIN HFA) 108 (90 Base) MCG/ACT inhaler Inhale 2 puffs into the lungs 4 times daily as needed for Wheezing 1 Inhaler 5    vitamin D (ERGOCALCIFEROL) 39242 units CAPS capsule Take 1 capsule by mouth once a week 12 capsule 1    folic acid (FOLVITE) 1 MG tablet Take 1 tablet by mouth daily 90 tablet 1     No current facility-administered medications for this encounter. Social History:   Social History     Socioeconomic History    Marital status: Single     Spouse name: Not on file    Number of children: Not on file    Years of education: Not on file    Highest education level: Not on file   Occupational History    Not on file   Tobacco Use    Smoking status: Current Every Day Smoker     Packs/day: 1.00     Years: 30.00     Pack years: 30.00     Types: Cigarettes    Smokeless tobacco: Never Used   Vaping Use    Vaping Use: Never used   Substance and Sexual Activity    Alcohol use: Not Currently     Alcohol/week: 0.0 standard drinks    Drug use: Not Currently     Frequency: 2.0 times per week     Comment: last time 09/01/21    Sexual activity: Not Currently   Other Topics Concern    Not on file   Social History Narrative    Not on file     Social Determinants of Health     Financial Resource Strain: Medium Risk    Difficulty of Paying Living Expenses: Somewhat hard   Food Insecurity: No Food Insecurity    Worried About Running Out of Food in the Last Year: Never true    [place] of Food in the Last Year: Never true   Transportation Needs:     Lack of Transportation (Medical):  Lack of Transportation (Non-Medical):    Physical Activity:     Days of Exercise per Week:     Minutes of Exercise per Session:    Stress:     Feeling of Stress :    Social Connections:     Frequency of Communication with Friends and Family:     Frequency of Social Gatherings with Friends and Family:     Attends Rastafarian Services:     Active Member of Clubs or Organizations:     Attends Club or Organization Meetings:     Marital Status:    Intimate Partner Violence:     Fear of Current or Ex-Partner:     Emotionally Abused:     Physically Abused:     Sexually Abused:        TOBACCO:   reports that she has been smoking cigarettes. She has a 30.00 pack-year smoking history. She has never used smokeless tobacco.  ETOH:   reports previous alcohol use.     Family History: Family History   Problem Relation Age of Onset    Other Father     Cancer Maternal Grandmother     Heart Disease Paternal Grandmother     Esophageal Cancer Maternal Aunt     Arthritis Mother            Pt interventions:  Provided handout on  cognitive distortions, Provided education, Established rapport, Supportive techniques, CBT to target cognitive distortions  and Identified maladaptive thoughts        PLAN:   Cognitive Distortions  Art       Patient scheduled to return on:       Were changes or additions made to the treatment plan today?   YES []   NO [x]  Noted changes:

## 2021-11-05 ENCOUNTER — OFFICE VISIT (OUTPATIENT)
Dept: GASTROENTEROLOGY | Age: 51
End: 2021-11-05
Payer: MEDICARE

## 2021-11-05 VITALS — SYSTOLIC BLOOD PRESSURE: 118 MMHG | DIASTOLIC BLOOD PRESSURE: 86 MMHG

## 2021-11-05 DIAGNOSIS — M25.562 LEFT KNEE PAIN, UNSPECIFIED CHRONICITY: Primary | ICD-10-CM

## 2021-11-05 DIAGNOSIS — K86.1 CHRONIC PANCREATITIS, UNSPECIFIED PANCREATITIS TYPE (HCC): Primary | ICD-10-CM

## 2021-11-05 PROCEDURE — G8482 FLU IMMUNIZE ORDER/ADMIN: HCPCS | Performed by: INTERNAL MEDICINE

## 2021-11-05 PROCEDURE — G8427 DOCREV CUR MEDS BY ELIG CLIN: HCPCS | Performed by: INTERNAL MEDICINE

## 2021-11-05 PROCEDURE — G8420 CALC BMI NORM PARAMETERS: HCPCS | Performed by: INTERNAL MEDICINE

## 2021-11-05 PROCEDURE — 1111F DSCHRG MED/CURRENT MED MERGE: CPT | Performed by: INTERNAL MEDICINE

## 2021-11-05 PROCEDURE — 3017F COLORECTAL CA SCREEN DOC REV: CPT | Performed by: INTERNAL MEDICINE

## 2021-11-05 PROCEDURE — 4004F PT TOBACCO SCREEN RCVD TLK: CPT | Performed by: INTERNAL MEDICINE

## 2021-11-05 PROCEDURE — 99213 OFFICE O/P EST LOW 20 MIN: CPT | Performed by: INTERNAL MEDICINE

## 2021-11-05 RX ORDER — POLYETHYLENE GLYCOL 3350 17 G/17G
17 POWDER, FOR SOLUTION ORAL DAILY
Qty: 476 G | Refills: 0 | COMMUNITY
Start: 2021-11-05 | End: 2021-11-07

## 2021-11-05 ASSESSMENT — ENCOUNTER SYMPTOMS
VOMITING: 0
RESPIRATORY NEGATIVE: 1
RECTAL PAIN: 0
EYES NEGATIVE: 1
ABDOMINAL DISTENTION: 1
ALLERGIC/IMMUNOLOGIC NEGATIVE: 1
DIARRHEA: 1
CONSTIPATION: 1
BLOOD IN STOOL: 0

## 2021-11-05 NOTE — PROGRESS NOTES
GI FOLLOW UP    INTERVAL HISTORY:     Active smoking habits  Recent ERCP with pancreatic duct stone removal and stent exchange  Clinically doing well from GI standpoint aside from poor eating habits and poor appetite  Denies any abdominal pain  Reports of fatigue and tiredness  Bowel movements appear to be improving      Chief Complaint   Patient presents with    Follow-up     ERCP F/U     Gastroesophageal Reflux     pt takes prilosec BID. pt denies any symptom    Constipation     pt still having some constipation    Other     pt states she was taken off iron by pcp, pt requested this be done because she was tired of having black loose stool due to iron, since being taken off it her bm have been more normal            HISTORY OF PRESENT ILLNESS: Ms.Heather TIANNA White is a 45 y.o. female with a past history remarkable for hypertension, depression, COPD, alcoholism with dependency and recent mission for withdrawal symptoms, additionally admitted for intoxication COPD exacerbation identified to have anemia with fecal occult positive results, underwent EGD and colonoscopy (poor prep) that revealed no obvious upper GI sources of the patient's anemia however did reveal severe condyloma involving the anorectal region and general region.  Patient was referred to colorectal surgery and is undergoing evaluation to have her severe condyloma acuminata removed and treated for.     Patient will need a repeat colonoscopy with extended bowel prep when she is treated for condyloma soon as possible as she has a residual flat polyps identified on her index colonoscopy that need to be resected.     Is unlikely the patient is unable to tolerate bowel prep given that she has endoscopic evidence of severe gastroparesis. Fredie Rash most recent upper endoscopy revealed significant to moderate amount of residual gastric food disorder, ptsd, anxiety    Dysphagia     GI bleed 10/2020    Hypertension     Memory loss     Oxygen dependent     USES AT NIGHT - 3L/MIN    Pain, joint, ankle and foot     Pancreatic lesion 10/2020    Dr. Marilu Betts working up pt    Peripheral neuropathy     Seizures ([address]     LAST SEIZURE 3/2020 - FEELS IT WAS FROM ALCOHOL WITHDRAWL    Tension headache     Under care of team 09/29/2021    [facility]    Under care of team 09/29/2021    pain management-Hamzah Martines-[facility]-last visit sep 2021    Under care of team 09/29/2021    pulmonology-Dr Due[facility]as-[facility]-due for visit oct 26/2021    Under care of team 09/29/2021    psych-bahnfeldt NP-telemed-last visit 10/ 2021    Under care of team 09/29/2021    eg-[address]-last visit aug 2021    Under care of team     NEPHROLOGY - DR. ALMANZA.    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    COLONOSCOPY N/A 10/22/2020    COLONOSCOPY DIAGNOSTIC performed by Baljinder Hardy MD at [address] [place] [address]  07/28/2021    CT ABSCESS DRAIN SUBCUTANEOUS 7/28/2021 [facility] CT SCAN    ENDOSCOPIC ULTRASOUND (LOWER) N/A 12/09/2020    ENDOSCOPIC ULTRASOUND, UPPER WITH LINEAR SCOPE FOR BIOPSY OF MASS ON HEAD OF PANCREAS performed by Corey Zamora MD at [address] ERCP  08/11/2021    ERCP N/A 08/11/2021    ERCP STENT INSERTION performed by Marquez Presley MD at [facility]    ERCP  08/11/2021    ERCP SPHINCTER/PAPILLOTOMY performed by Marquez Presley MD at [facility]    ERCP  10/21/2021    ERCP STONE REMOVAL    ERCP N/A 10/21/2021    ERCP STENT REMOVAL/EXCHANGE performed by Marquez Presley MD at [address] [place] [address] ERCP  10/21/2021    ERCP STONE REMOVAL performed by Dulce Olson Hellen Nguyen MD at [address] ERCP  10/21/2021    ERCP ENDOSCOPIC RETROGRADE CHOLANGIOPANCREATOGRAPHY DIRECT VISUALIZATION performed by Mabel Bynum MD at [address] Left 07/03/2013    ORIF tibial plateau    FRACTURE SURGERY Right     small finger metacarpal fracture    HAND SURGERY      pins    HYSTERECTOMY  2003    SPINE SURGERY N/A 09/10/2021    KYPHOPLASTY lumbar L5 performed by Elle Mishra MD at [address] [place] [address] 10/22/2020    EGD BIOPSY performed by Kye Lam MD at [address] [place] [address] N/A 04/05/2021    EGD BIOPSY performed by Kye Lam MD at [address] [place] [address] N/A 09/08/2021    ENDOSCOPIC ULTRASOUND, LINEAR SCOPE performed by Devin Mancia MD at [address]:    Current Outpatient Medications:     fluticasone-umeclidin-vilant (TRELEGY ELLIPTA) 100-62.5-25 MCG/INH AEPB, Inhale 1 puff into the lungs daily, Disp: 1 each, Rfl: 5    predniSONE (DELTASONE) 10 MG tablet, 4 tabs by mouth daily for 3 days, then 3 tabs daily for 3 days, then 2 tabs daily for 3 days, then 1 tab daily till gone., Disp: 30 tablet, Rfl: 0    denosumab (PROLIA) 60 MG/ML SOSY SC injection, Inject 1 mL into the skin every 6 months, Disp: 1 mL, Rfl: 1    ondansetron (ZOFRAN ODT) 4 MG disintegrating tablet, Take 1 tablet by mouth every 8 hours as needed for Nausea or Vomiting, Disp: 10 tablet, Rfl: 0    sodium chloride 1 g tablet, Take 1 tablet by mouth 4 times daily, Disp: 120 tablet, Rfl: 3    CREON 87782-58670 units delayed release capsule, TAKE ONE CAPSULE BY MOUTH THREE TIMES A DAY WITH MEALS, Disp: 90 capsule, Rfl: 1    pregabalin (LYRICA) 200 MG capsule, Take 1 capsule by mouth 3 times daily for 180 days. , Disp: 90 capsule, Rfl: 5    carBAMazepine (TEGRETOL) 200 MG tablet, TAKE ONE TABLET BY MOUTH THREE TIMES A DAY, Disp: 90 tablet, Rfl: 0    topiramate (TOPAMAX) 50 MG tablet, TAKE ONE TABLET BY MOUTH TWICE A DAY, Disp: 180 tablet, Rfl: 1    busPIRone (BUSPAR) 30 MG tablet, TAKE ONE TABLET BY MOUTH TWICE A DAY WITH A MEAL, Disp: 60 tablet, Rfl: 0    spironolactone (ALDACTONE) 50 MG tablet, TAKE ONE TABLET BY MOUTH DAILY, Disp: 90 tablet, Rfl: 1    amLODIPine (NORVASC) 5 MG tablet, TAKE ONE TABLET BY MOUTH DAILY, Disp: 90 tablet, Rfl: 1    escitalopram (LEXAPRO) 5 MG tablet, TAKE ONE TABLET BY MOUTH DAILY, Disp: 30 tablet, Rfl: 1    KLOR-CON M20 20 MEQ extended release tablet, TAKE ONE TABLET BY MOUTH DAILY, Disp: 90 tablet, Rfl: 1    DULoxetine (CYMBALTA) 60 MG extended release capsule, Take 60 mg by mouth daily , Disp: , Rfl:     simethicone (MYLICON) 80 MG chewable tablet, Take 1 tablet by mouth 4 times daily as needed for Flatulence, Disp: 180 tablet, Rfl: 3    hydrOXYzine (ATARAX) 25 MG tablet, Take 1 tablet by mouth 3 times daily as needed for Anxiety (Patient taking differently: Take 25 mg by mouth 3 times daily ), Disp: 90 tablet, Rfl: 3    metoclopramide (REGLAN) 5 MG tablet, Take 1 tablet by mouth 3 times daily, Disp: 120 tablet, Rfl: 2    omeprazole (PRILOSEC) 40 MG delayed release capsule, Take 1 capsule by mouth 2 times daily, Disp: 60 capsule, Rfl: 2    rOPINIRole (REQUIP) 1 MG tablet, Take 1 tablet by mouth nightly, Disp: 90 tablet, Rfl: 1    budesonide-formoterol (SYMBICORT) 160-4.5 MCG/ACT AERO, Inhale 2 puffs into the lungs 2 times daily, Disp: 3 Inhaler, Rfl: 1    albuterol sulfate HFA (VENTOLIN HFA) 108 (90 Base) MCG/ACT inhaler, Inhale 2 puffs into the lungs 4 times daily as needed for Wheezing, Disp: 1 Inhaler, Rfl: 5    vitamin D (ERGOCALCIFEROL) 62096 units CAPS capsule, Take 1 capsule by mouth once a week, Disp: 12 capsule, Rfl: 1    folic acid (FOLVITE) 1 MG tablet, Take 1 tablet by mouth daily, Disp: 90 tablet, Rfl: 1    traZODone (DESYREL) 50 MG tablet, Take 1 tablet by mouth nightly as needed for Sleep, Disp: 30 tablet, Rfl: 0    tiotropium (Urszula Martinez) 2.5 MCG/ACT AERS inhaler, Inhale 2 puffs into the lungs daily, Disp: 1 Inhaler, Rfl: 11    ALLERGIES:   No Known Allergies    FAMILY HISTORY:       Problem Relation Age of Onset    Other Father     Cancer Maternal Grandmother     Heart Disease Paternal Grandmother     Esophageal Cancer Maternal Aunt     Arthritis Mother          SOCIAL HISTORY:   Social History     Socioeconomic History    Marital status: Single     Spouse name: Not on file    Number of children: Not on file    Years of education: Not on file    Highest education level: Not on file   Occupational History    Not on file   Tobacco Use    Smoking status: Current Every Day Smoker     Packs/day: 1.00     Years: 30.00     Pack years: 30.00     Types: Cigarettes    Smokeless tobacco: Never Used   Vaping Use    Vaping Use: Never used   Substance and Sexual Activity    Alcohol use: Not Currently     Alcohol/week: 0.0 standard drinks    Drug use: Not Currently     Frequency: 2.0 times per week     Comment: last time 09/01/21    Sexual activity: Not Currently   Other Topics Concern    Not on file   Social History Narrative    Not on file     Social Determinants of Health     Financial Resource Strain: Medium Risk    Difficulty of Paying Living Expenses: Somewhat hard   Food Insecurity: No Food Insecurity    Worried About Running Out of Food in the Last Year: Never true    Tyler of Food in the Last Year: Never true   Transportation Needs:     Lack of Transportation (Medical):      Lack of Transportation (Non-Medical):    Physical Activity:     Days of Exercise per Week:     Minutes of Exercise per Session:    Stress:     Feeling of Stress :    Social Connections:     Frequency of Communication with Friends and Family:     Frequency of Social Gatherings with Friends and Family:     Attends Yarsanism Services:     Active Member of Clubs or Organizations:     Attends Club or Organization Meetings:     Marital Status:    Intimate Partner Violence:     Fear of Current or Ex-Partner:     Emotionally Abused:     Physically Abused:     Sexually Abused:        REVIEW OF SYSTEMS: A 12-point review of systems was obtained and pertinent positives and negatives were listed below. REVIEW OF SYSTEMS:     Constitutional: No fever, no chills, no lethargy, no weakness. HEENT:  No headache, otalgia, itchy eyes, nasal discharge or sore throat. Cardiac:  No chest pain, dyspnea, orthopnea or PND. Chest:   No cough, phlegm or wheezing. Abdomen:      Detailed by MA   Neuro:  No focal weakness, abnormal movements or seizure like activity. Skin:   No rashes, no itching. :   No hematuria, no pyuria, no dysuria, no flank pain. Extremities:  No swelling or joint pains. ROS was otherwise negative    Review of Systems   Constitutional: Positive for appetite change and fatigue. Negative for unexpected weight change. HENT: Negative. Trouble swallowing: saliva. Eyes: Negative. Respiratory: Negative. Cardiovascular: Negative. Gastrointestinal: Positive for abdominal distention, constipation and diarrhea. Negative for blood in stool, rectal pain and vomiting. Anal bleeding: hemorrhoids. Endocrine: Negative. Genitourinary: Negative. Musculoskeletal: Positive for gait problem. Skin: Negative. Allergic/Immunologic: Negative. Neurological: Positive for dizziness, light-headedness and headaches. Hematological: Negative. Psychiatric/Behavioral: Positive for sleep disturbance. All other systems reviewed and are negative. PHYSICAL EXAMINATION: Vital signs reviewed per the nursing documentation. /86   There is no height or weight on file to calculate BMI. Physical Exam    Physical Exam   Constitutional: Patient is oriented to person, place, and time. Patient appears well-developed and well-nourished. HENT:   Head: Normocephalic and atraumatic.    Eyes: Pupils are equal, round, and reactive to light. EOM are normal.   Neck: Normal range of motion. Neck supple. No JVD present. No tracheal deviation present. No thyromegaly present. Cardiovascular: Normal rate, regular rhythm, normal heart sounds and intact distal pulses. Pulmonary/Chest: Effort normal and breath sounds normal. No stridor. No respiratory distress. He has no wheezes. He has no rales. He exhibits no tenderness. Abdominal: Soft. Bowel sounds are normal. He exhibits no distension and no mass. There is no tenderness. There is no rebound and no guarding. No hernia. Musculoskeletal: Normal range of motion. Lymphadenopathy:    Patient has no cervical adenopathy. Neurological: Patient is alert and oriented to person, place, and time. Psychiatric: Patient has a normal mood and affect. Patient behavior is normal.       LABORATORY DATA: Reviewed  Lab Results   Component Value Date    WBC 11.1 10/27/2021    HGB 14.8 10/27/2021    HCT 47.4 (H) 10/27/2021    MCV 91.2 10/27/2021     (H) 10/27/2021     (L) 11/03/2021    K 5.4 (H) 11/03/2021    CL 97 (L) 11/03/2021    CO2 23 11/03/2021    BUN 9 11/03/2021    CREATININE 0.53 11/03/2021    LABALBU 3.8 10/12/2021    BILITOT 0.19 (L) 10/12/2021    ALKPHOS 211 (H) 10/12/2021    AST 14 10/12/2021    ALT 7 10/12/2021    INR 1.0 08/09/2021         Lab Results   Component Value Date    RBC 5.20 (H) 10/27/2021    HGB 14.8 10/27/2021    MCV 91.2 10/27/2021    MCH 28.5 10/27/2021    MCHC 31.2 10/27/2021    RDW 12.9 10/27/2021    MPV 9.6 10/27/2021    BASOPCT 0 10/27/2021    LYMPHSABS 2.49 10/27/2021    MONOSABS 0.64 10/27/2021    NEUTROABS 7.94 10/27/2021    EOSABS <0.03 10/27/2021    BASOSABS <0.03 10/27/2021         DIAGNOSTIC TESTING:     DEXA BONE DENSITY AXIAL SKELETON    Result Date: 10/8/2021  EXAMINATION: BONE DENSITOMETRY 10/8/2021 1:13 pm TECHNIQUE: A bone density dual x-ray absorptiometry (DEXA) scan was performed of the lumbar spine and left hip  on a [employer] system. resected.     Is unlikely the patient is unable to tolerate bowel prep given that she has endoscopic evidence of severe gastroparesis. Fausto Mc most recent upper endoscopy revealed significant to moderate amount of residual gastric food undigested suggestive of gastroparesis     Repeat upper endoscopy was performed abnormal esophagram which revealed a possible intraluminal narrowing in the midesophagus possible related to aortic arch compression.  EGD revealed possible extraluminal narrowing in the midesophagus but no intraluminal lesions that were obvious.  Stomach again was filled with partially digested food suggestive of gastroparesis versus nonadherence to dietary instructions versus medication induced poor contractility     Currently the patient reports a difficulty initiating swallowing in the oropharyngeal region.  Does not report any globus sensation but does not report any esophageal symptoms     CT of the chest was negative for any extraluminal compression of the midesophagus  Swallowing study was unremarkable  Emptying test identified evidence of gastroparesis     Interval history:  Recent complicated hospital course identified by a possible peripancreatic fluid collection in the setting of either pancreatic duct leak versus acute on chronic pancreatitis related to chronic pancreatitis and PD stricturing  Status post ERCP with pancreatic duct stent-5 [place] 9 cm single pigtail  Underwent percutaneous drain peripancreatic fluid with recent removal  Clinically doing well from GI standpoint with mild unintentional weight loss  Here for evaluation after discharge        PLAN:    1) 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 Bowel movements improved which may account for adequate pancreatic drainage. Will send for pancreatic elastase.     2) severe condyloma acuminata on anal genital region-ongoing evaluation by colorectal surgery--- pending resection treatment. 3) Colonoscopy with extended bowel prep, 2 days bowel prep, CT abdomen negative 9/2021. Patient had a poor prep colonoscopy approximately 1 year ago. Needs colonoscopy for removal potential colon polyps. Strongly advised smoking cessation. Patient has active smoking habits. Thank you for allowing me to participate in the care of Ms. Steph Lindsey. For any further questions please do not hesitate to contact me. I have reviewed and agree with the ROS entered by the MA/LPN from today's encounter documented in a separate note. Lee Goodpasture MD, MPH   [facility]  Office #: [phone]    this note is created with the assistance of a speech recognition program.  While intending to generate a document that actually reflects the content of the visit, the document can still have some errors including those of syntax and sound a like substitutions which may escape proof reading. It such instances, actual meaning can be extrapolated by contextual diversion.

## 2021-11-08 ENCOUNTER — TELEPHONE (OUTPATIENT)
Dept: FAMILY MEDICINE CLINIC | Age: 51
End: 2021-11-08

## 2021-11-08 RX ORDER — POLYETHYLENE GLYCOL 3350 17 G/17G
POWDER, FOR SOLUTION ORAL
Qty: 51 G | Refills: 0 | Status: SHIPPED | OUTPATIENT
Start: 2021-11-08 | End: 2022-01-05 | Stop reason: ALTCHOICE

## 2021-11-08 RX ORDER — POLYETHYLENE GLYCOL 3350, SODIUM SULFATE ANHYDROUS, SODIUM BICARBONATE, SODIUM CHLORIDE, POTASSIUM CHLORIDE 236; 22.74; 6.74; 5.86; 2.97 G/4L; G/4L; G/4L; G/4L; G/4L
POWDER, FOR SOLUTION ORAL
Qty: 4000 ML | Refills: 0 | Status: SHIPPED | OUTPATIENT
Start: 2021-11-08 | End: 2022-01-05

## 2021-11-08 RX ORDER — BISACODYL 5 MG
TABLET, DELAYED RELEASE (ENTERIC COATED) ORAL
Qty: 4 TABLET | Refills: 0 | Status: SHIPPED | OUTPATIENT
Start: 2021-11-08 | End: 2022-01-05 | Stop reason: ALTCHOICE

## 2021-11-08 NOTE — TELEPHONE ENCOUNTER
Patient called stating Dr. Marilu Loredo wanted her to check with her psychiatrist about changing medications. She thought she said to discuss changing the lexpro and cymbalta to effexor but she wanted to make sure.

## 2021-11-09 ENCOUNTER — OFFICE VISIT (OUTPATIENT)
Dept: ORTHOPEDIC SURGERY | Age: 51
End: 2021-11-09

## 2021-11-09 ENCOUNTER — HOSPITAL ENCOUNTER (OUTPATIENT)
Dept: PSYCHIATRY | Age: 51
Setting detail: THERAPIES SERIES
Discharge: HOME | End: 2021-11-09
Payer: MEDICARE

## 2021-11-09 ENCOUNTER — HOSPITAL ENCOUNTER (OUTPATIENT)
Age: 51
Discharge: HOME | End: 2021-11-09
Payer: MEDICARE

## 2021-11-09 VITALS — WEIGHT: 130 LBS | BODY MASS INDEX: 23.04 KG/M2 | HEIGHT: 63 IN

## 2021-11-09 DIAGNOSIS — K86.1 CHRONIC PANCREATITIS, UNSPECIFIED PANCREATITIS TYPE (HCC): ICD-10-CM

## 2021-11-09 DIAGNOSIS — E87.1 HYPONATREMIA: ICD-10-CM

## 2021-11-09 DIAGNOSIS — N39.41 URGE INCONTINENCE OF URINE: ICD-10-CM

## 2021-11-09 DIAGNOSIS — M25.562 LEFT KNEE PAIN, UNSPECIFIED CHRONICITY: Primary | ICD-10-CM

## 2021-11-09 LAB
ABSOLUTE EOS #: 0.06 K/UL (ref 0–0.44)
ABSOLUTE IMMATURE GRANULOCYTE: 0.05 K/UL (ref 0–0.3)
ABSOLUTE LYMPH #: 2.76 K/UL (ref 1.1–3.7)
ABSOLUTE MONO #: 0.83 K/UL (ref 0.1–1.2)
ANION GAP SERPL CALCULATED.3IONS-SCNC: 14 MMOL/L (ref 9–17)
BASOPHILS # BLD: 0 % (ref 0–2)
BASOPHILS ABSOLUTE: 0.03 K/UL (ref 0–0.2)
BUN BLDV-MCNC: 8 MG/DL (ref 6–20)
BUN/CREAT BLD: ABNORMAL (ref 9–20)
CALCIUM SERPL-MCNC: 9.6 MG/DL (ref 8.6–10.4)
CHLORIDE BLD-SCNC: 101 MMOL/L (ref 98–107)
CO2: 23 MMOL/L (ref 20–31)
CREAT SERPL-MCNC: 0.5 MG/DL (ref 0.5–0.9)
DIFFERENTIAL TYPE: ABNORMAL
EOSINOPHILS RELATIVE PERCENT: 1 % (ref 1–4)
FOLATE: 16 NG/ML
GFR AFRICAN AMERICAN: >60 ML/MIN
GFR NON-AFRICAN AMERICAN: >60 ML/MIN
GFR SERPL CREATININE-BSD FRML MDRD: ABNORMAL ML/MIN/{1.73_M2}
GFR SERPL CREATININE-BSD FRML MDRD: ABNORMAL ML/MIN/{1.73_M2}
GLUCOSE BLD-MCNC: 113 MG/DL (ref 70–99)
HCT VFR BLD CALC: 40.2 % (ref 36.3–47.1)
HEMOGLOBIN: 12.7 G/DL (ref 11.9–15.1)
IMMATURE GRANULOCYTES: 1 %
IRON SATURATION: 24 % (ref 20–55)
IRON: 54 UG/DL (ref 37–145)
LYMPHOCYTES # BLD: 27 % (ref 24–43)
MCH RBC QN AUTO: 28.5 PG (ref 25.2–33.5)
MCHC RBC AUTO-ENTMCNC: 31.6 G/DL (ref 28.4–34.8)
MCV RBC AUTO: 90.3 FL (ref 82.6–102.9)
MONOCYTES # BLD: 8 % (ref 3–12)
NRBC AUTOMATED: 0 PER 100 WBC
PDW BLD-RTO: 13.8 % (ref 11.8–14.4)
PLATELET # BLD: 563 K/UL (ref 138–453)
PLATELET ESTIMATE: ABNORMAL
PMV BLD AUTO: 9.6 FL (ref 8.1–13.5)
POTASSIUM SERPL-SCNC: 5 MMOL/L (ref 3.7–5.3)
RBC # BLD: 4.45 M/UL (ref 3.95–5.11)
RBC # BLD: ABNORMAL 10*6/UL
SEG NEUTROPHILS: 63 % (ref 36–65)
SEGMENTED NEUTROPHILS ABSOLUTE COUNT: 6.68 K/UL (ref 1.5–8.1)
SODIUM BLD-SCNC: 138 MMOL/L (ref 135–144)
TOTAL IRON BINDING CAPACITY: 222 UG/DL (ref 250–450)
UNSATURATED IRON BINDING CAPACITY: 168 UG/DL (ref 112–347)
VITAMIN B-12: 518 PG/ML (ref 232–1245)
VITAMIN D 25-HYDROXY: 51.1 NG/ML (ref 30–100)
WBC # BLD: 10.4 K/UL (ref 3.5–11.3)
WBC # BLD: ABNORMAL 10*3/UL

## 2021-11-09 PROCEDURE — 82746 ASSAY OF FOLIC ACID SERUM: CPT

## 2021-11-09 PROCEDURE — 90837 PSYTX W PT 60 MINUTES: CPT | Performed by: SOCIAL WORKER

## 2021-11-09 PROCEDURE — 36415 COLL VENOUS BLD VENIPUNCTURE: CPT

## 2021-11-09 PROCEDURE — 82306 VITAMIN D 25 HYDROXY: CPT

## 2021-11-09 PROCEDURE — 83540 ASSAY OF IRON: CPT

## 2021-11-09 PROCEDURE — 80048 BASIC METABOLIC PNL TOTAL CA: CPT

## 2021-11-09 PROCEDURE — 85025 COMPLETE CBC W/AUTO DIFF WBC: CPT

## 2021-11-09 PROCEDURE — 82607 VITAMIN B-12: CPT

## 2021-11-09 PROCEDURE — 83550 IRON BINDING TEST: CPT

## 2021-11-09 PROCEDURE — 83520 IMMUNOASSAY QUANT NOS NONAB: CPT

## 2021-11-09 PROCEDURE — 99024 POSTOP FOLLOW-UP VISIT: CPT | Performed by: ORTHOPAEDIC SURGERY

## 2021-11-09 NOTE — PROGRESS NOTES
[facility]  [facility]  [address] [address]  Dept: [phone]  Dept Fax: [phone]        [facility] New Patient      Subjective:   Date of injury: 7/3/2013    Josiane Harris is a 45y.o. year old female who presents to our clinic for left knee pain. Of note, the patient sustained a tibial plateau fracture which was operatively repaired by Dr. eDanne Miller on 7/3/2013. The patient's postoperative course was complicated by CRPS of the left ankle, which the patient states spontaneously. Of note, the patient states that she does not recall very much regarding her injury and postoperative course that she was a heavy drinker. Today she presents with a chief complaint of feeling a screw on the inside of her leg. Patient states that this bothers her, she rates it approximately 4-5 out of 10. She was here to discuss possible options regarding her screw prominence and removal. She does state that she has occasional joint line knee pain, and occasional sensation of instability. She has no other acute new complaints. Review of Systems  Gen: no fever, chills, malaise  CV: no chest pain or palpitations  Resp: no cough or shortness of breath  GI: no nausea, vomiting, diarrhea, or constipation  Neuro: no seizures, vertigo, or headache  Msk: left leg prominent screw  10 remaining systems reviewed and negative    Objective : There were no vitals filed for this visit. Body mass index is 23.03 kg/m². General: No acute distress, resting comfortably in the clinic  Neuro: alert. oriented  Eyes: Extra-ocular muscles intact  Pulm: Respirations unlabored and regular. Skin: warm, well perfused  Psych:   Patient has good fund of knowledge and displays understanding of exam, diagnosis, and plan. LLE: Surgical incision well-healed with mature scar formation. Valgus laxity noted. Anterior/posterior drawer test ligamentously intact.   Varus stress ligamentously intact. Mild prominence noted on the medial aspect of the proximal tibia, consistent with a screw. Overlying skin does not demonstrate any tenting or irritation. Compartments soft. 2+ DP pulse. TA/EHL/FHL/GS motor intact. Deep and Superficial Peroneal/Saphenous/Sural SILT. Radiology:  History: Tibial plateau fracture    Comparison: 4-1-2014    Findings: Multiple views of the left knee with orthopedic hardware in the proximal tibia. There was a prior tibial plateau fracture which has since healed. There are no new acute fractures or dislocations. There are no signs of orthopedic hardware failure. Degenerative changes noted within the medial lateral joint compartments. Impression: Left knee status post ORIF without acute fractures or hardware complications     Assessment:   45y.o. year old female with left tibial plateau fracture, which underwent ORIF 8 years ago with Dr. Shaina Huber:     -Discussed with the patient that the screw prominence may be removed during the surgical procedure. At this time, the patient states it does not bother her significantly however she will return if it does bother her. We discussed all options with the patient. At this time, she does not want surgery however states that if it continues to bother her she will return for further discussions. Follow up: As needed    Electronically signed by Teja Delgado DO on 11/9/2021 at 1:33 PM    This note is created with the assistance of a speech recognition program.  While intending to generate a document that actually reflects the content of the visit, the document can still have some errors including those of syntax and sound a like substitutions which may escape proof reading.   In such instances, actual meaning can be extrapolated by contextual diversion

## 2021-11-09 NOTE — PSYCHOTHERAPY
[facility] Note in [facility], [address]   11/9/2021   1:00 PM   Nomi Fulton  1970  [id number]    Time spent with Patient: 60 minutes      Pt was provided informed consent for the [facility]. Discussed with patient model of service to include the limits of confidentiality (i.e. abuse reporting, suicide intervention, etc.) and short-term intervention focused approach. Pt indicated understanding. S:    Pt and therapist met for session in person at the [facility]. Pt and therapist processed pt's current struggles, including her unfulfilled need of physical touch. Therapist and pt explored this need and therapist offered psychoeducation on the love languages. Pt and therapist explored pt's thoughts and feelings when she does and does not receive physical touch and the origins of this need. Pt reports feeling pain, empty and achy when she doesn't get physical touch and safe and secure when she does. Pt and therapist discussed ways to accept other ways her M and sister shows love. Pt and therapist used CBT techniques to explore the difference between being \"needy\" and \"needing something. \"  Therapist and pt processed her childhood and how it affects this need and pt identified that her M is her symbol of safety and stability, and physical touch ensures that she is safe. Therapist and pt discussed expressing this need to M and sister in hopes they will be able to fulfill it better. Pt is also determined to accept the way they love too. Pt also expressed fear over possible cancer results. Therapist and pt used CBT techniques to stay grounded and reduce anxiety. Pt reports fears of death and a cancer diagnosis. Pt reports she has no current intent, plan, or means for suicide but reports that if she had a terminal cancer diagnosis the thought of suicide has crossed her mind so she wouldn't have to suffer.   Pt and therapist processed this and therapist explained limits of confidentiality and possible need for safety plan. Pt confirmed she knew crisis numbers and would use them or connect with family if she was suicidal.  Pt denied any current suicidal thoughts or desires to die. Pt denied need for safety plan or check in call. O:  MSE:     Appearance    alert, cooperative, crying, mild distress  Appetite normal  Sleep disturbance No   Fatigue No  Loss of pleasure No  Impulsive behavior No  Speech    normal rate, normal volume and well articulated  Mood    Depressed  Worthless  Low self-esteem  Affect    depressed affect  Thought Content    worthlessness and excessive preoccupations  Thought Process    linear, goal directed and coherent  Associations    logical connections  Insight    Good  Judgment    IImpaired  Orientation    oriented to person, place, time, and general circumstances  Memory    recent and remote memory intact  Attention/Concentration    intact  Morbid ideation No  Suicide Assessment    No current suicidal ideation, pt worried about having suicidal thoughts if she has cancer     A:    Pt presented to therapy session in a depressed mood but displayed some happiness throughout session. Pt and therapist processed her past that has led her to her current struggles with not receiving physical touch and her equation of touch to love. Pt struggles accepting that she is loved and safe without physical touch. Pt has also been struggling with her own mortality after having to undergo some tests to look for cancer markers. Pt denies any suicidal ideation, plan, intent, or means but has expressed that she doesn't know how she would feel if she had a terminal cancer diagnosis. Pt denied need for safety plan now, but is open to possible safety plan if needed and identified multiple supports and options should suicidal thoughts present.         Visit Diagnosis:   PTSD      History from Medical Record:        Diagnosis Date    Acute respiratory failure with hypoxia ([facility] 10/16/2020    Alcohol withdrawal syndrome, with delirium ([facility] 12/14/2019    Alcoholism ([facility]     Anemia 10/2020    GI bleed    Anxiety     [facility] - 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24, the patient underwent 2 surgical resections without known recurrence 10/23/2020    Closed fracture of lateral portion of left tibial plateau 17/26/3218    COPD (chronic obstructive pulmonary disease) ([facility]     CO2 retainer, on Bipap at night for this, Dr. Arelis Brothers ( last visit 11/20/2020 and note on chart )    Depression     bipolar, major depressive disorder, ptsd, anxiety    Dysphagia     GI bleed 10/2020    Hypertension     Memory loss     Oxygen dependent     USES AT NIGHT - 3L/MIN    Pain, joint, ankle and foot     Pancreatic lesion 10/2020    Dr. Inna Guan working up pt    Peripheral neuropathy     Seizures ([facility]     LAST SEIZURE 3/2020 - FEELS IT WAS FROM ALCOHOL WITHDRAWL    Tension headache     Under care of team 09/29/2021    neuro-Dr Coyle-[facility] ct-last visit sep 2021    Under care of team 09/29/2021    pain management-Hamzah Martines-nery jimenez-last visit sep 2021    Under care of team 09/29/2021    pulmonology-Dr Due[facility]as-[facility]-due for visit oct 26/2021    Under care of team 09/29/2021    psych-bahnfeldt NP-telemed-last visit 10/ 2021    Under care of team 09/29/2021    [address]-last visit aug 2021    Under care of team     NEPHROLOGY - DR. CHAPMAN     Wears glasses     Wears partial dentures     UPPER    Wellness examination 09/29/2021    pcp-Ludivina grimes-last visit june 2021     Medications:   Current Outpatient Medications   Medication Sig Dispense Refill    polyethylene glycol (GOLYTELY) 236 g solution Follow instructions provided by physicians office 4000 mL 0    bisacodyl (BISACODYL) 5 MG EC tablet Take 4 tabs at 10am the day prior to Colonoscopy 4 tablet 0    polyethylene glycol (GLYCOLAX) 17 GM/SCOOP powder Use as directed following your patient instructions given by office 51 g 0    fluticasone-umeclidin-vilant (TRELEGY ELLIPTA) 100-62.5-25 MCG/INH AEPB Inhale 1 puff into the lungs daily 1 each 5    predniSONE (DELTASONE) 10 MG tablet 4 tabs by mouth daily for 3 days, then 3 tabs daily for 3 days, then 2 tabs daily for 3 days, then 1 tab daily till gone. 30 tablet 0    denosumab (PROLIA) 60 MG/ML SOSY SC injection Inject 1 mL into the skin every 6 months 1 mL 1    ondansetron (ZOFRAN ODT) 4 MG disintegrating tablet Take 1 tablet by mouth every 8 hours as needed for Nausea or Vomiting 10 tablet 0    sodium chloride 1 g tablet Take 1 tablet by mouth 4 times daily 120 tablet 3    CREON 73897-85600 units delayed release capsule TAKE ONE CAPSULE BY MOUTH THREE TIMES A DAY WITH MEALS 90 capsule 1    pregabalin (LYRICA) 200 MG capsule Take 1 capsule by mouth 3 times daily for 180 days.  90 capsule 5    carBAMazepine (TEGRETOL) 200 MG tablet TAKE ONE TABLET BY MOUTH THREE TIMES A DAY 90 tablet 0    topiramate (TOPAMAX) 50 MG tablet TAKE ONE TABLET BY MOUTH TWICE A  tablet 1    busPIRone (BUSPAR) 30 MG tablet TAKE ONE TABLET BY MOUTH TWICE A DAY WITH A MEAL 60 tablet 0    spironolactone (ALDACTONE) 50 MG tablet TAKE ONE TABLET BY MOUTH DAILY 90 tablet 1    amLODIPine (NORVASC) 5 MG tablet TAKE ONE TABLET BY MOUTH DAILY 90 tablet 1    escitalopram (LEXAPRO) 5 MG tablet TAKE ONE TABLET BY MOUTH DAILY 30 tablet 1    KLOR-CON M20 20 MEQ extended release tablet TAKE ONE TABLET BY MOUTH DAILY 90 tablet 1    DULoxetine (CYMBALTA) 60 MG extended release capsule Take 60 mg by mouth daily       simethicone (MYLICON) 80 MG chewable tablet Take 1 tablet by mouth 4 times daily as needed for Flatulence 180 tablet 3    hydrOXYzine (ATARAX) 25 MG tablet Take 1 tablet by mouth 3 times daily as needed for Anxiety (Patient taking differently: Take 25 mg by mouth 3 times daily ) 90 tablet 3    metoclopramide (REGLAN) 5 MG tablet Take 1 tablet by mouth 3 times daily 120 tablet 2    omeprazole (PRILOSEC) 40 MG delayed release capsule Take 1 capsule by mouth 2 times daily 60 capsule 2    traZODone (DESYREL) 50 MG tablet Take 1 tablet by mouth nightly as needed for Sleep 30 tablet 0    rOPINIRole (REQUIP) 1 MG tablet Take 1 tablet by mouth nightly 90 tablet 1    budesonide-formoterol (SYMBICORT) 160-4.5 MCG/ACT AERO Inhale 2 puffs into the lungs 2 times daily 3 Inhaler 1    tiotropium (SPIRIVA RESPIMAT) 2.5 MCG/ACT AERS inhaler Inhale 2 puffs into the lungs daily 1 Inhaler 11    albuterol sulfate HFA (VENTOLIN HFA) 108 (90 Base) MCG/ACT inhaler Inhale 2 puffs into the lungs 4 times daily as needed for Wheezing 1 Inhaler 5    vitamin D (ERGOCALCIFEROL) 48817 units CAPS capsule Take 1 capsule by mouth once a week 12 capsule 1    folic acid (FOLVITE) 1 MG tablet Take 1 tablet by mouth daily 90 tablet 1     No current facility-administered medications for this encounter.        Social History:   Social History     Socioeconomic History    Marital status: Single     Spouse name: Not on file    Number of children: Not on file    Years of education: Not on file    Highest education level: Not on file   Occupational History    Not on file   Tobacco Use    Smoking status: Current Every Day Smoker     Packs/day: 1.00     Years: 30.00     Pack years: 30.00     Types: Cigarettes    Smokeless tobacco: Never Used   Vaping Use    Vaping Use: Never used   Substance and Sexual Activity    Alcohol use: Not Currently     Alcohol/week: 0.0 standard drinks    Drug use: Not Currently     Frequency: 2.0 times per week     Comment: last time 09/01/21    Sexual activity: Not Currently   Other Topics Concern    Not on file   Social History Narrative    Not on file     Social Determinants of Health     Financial Resource Strain: Medium Risk    Difficulty of Paying Living Expenses: Somewhat hard   Food Insecurity: No Food Insecurity    Worried About Running Out of Food in the Last Year: Never true    [address] in the Last Year: Never true   Transportation Needs:     Lack of Transportation (Medical): Not on file    Lack of Transportation (Non-Medical): Not on file   Physical Activity:     Days of Exercise per Week: Not on file    Minutes of Exercise per Session: Not on file   Stress:     Feeling of Stress : Not on file   Social Connections:     Frequency of Communication with Friends and Family: Not on file    Frequency of Social Gatherings with Friends and Family: Not on file    Attends [employer] Services: Not on file    Active Member of [address] [place] [address] [employer] or Organizations: Not on file    Attends Club or Organization Meetings: Not on file    Marital Status: Not on file   Intimate Partner Violence:     Fear of Current or Ex-Partner: Not on file    Emotionally Abused: Not on file    Physically Abused: Not on file    Sexually Abused: Not on file   Housing Stability:     Unable to Pay for Housing in the Last Year: Not on file    Number of [place] in the Last Year: Not on file    Unstable Housing in the Last Year: Not on file       TOBACCO:   reports that she has been smoking cigarettes. She has a 30.00 pack-year smoking history. She has never used smokeless tobacco.  ETOH:   reports previous alcohol use.     Family History:   Family History   Problem Relation Age of Onset    Other Father     Cancer Maternal Grandmother     Heart Disease Paternal Grandmother     Esophageal Cancer Maternal Aunt     Arthritis Mother            Pt interventions:  Practiced assertive communication, Trained in improving communication skills, Provided education, Discussed self-care (sleep, nutrition, rewarding activities, social support, exercise), Established rapport, Conducted functional assessment, Supportive techniques and CBT to target maladaptive thoughts         PLAN:   Discuss test results  Cognitive distortions  Possible safety plan       Patient scheduled to return on: Tuesday 11/16/21 at 2 PM   Were changes or additions made to the treatment plan today?   YES []   NO [x]  Noted changes:

## 2021-11-11 LAB — FECAL PANCREATIC ELASTASE-1: 342 UG/G

## 2021-11-11 RX ORDER — TRAZODONE HYDROCHLORIDE 50 MG/1
TABLET ORAL
Qty: 30 TABLET | Refills: 0 | Status: SHIPPED | OUTPATIENT
Start: 2021-11-11 | End: 2021-12-13

## 2021-11-11 RX ORDER — BUSPIRONE HYDROCHLORIDE 30 MG/1
TABLET ORAL
Qty: 60 TABLET | Refills: 0 | Status: SHIPPED | OUTPATIENT
Start: 2021-11-11 | End: 2021-12-13

## 2021-11-15 ENCOUNTER — PATIENT MESSAGE (OUTPATIENT)
Dept: GASTROENTEROLOGY | Age: 51
End: 2021-11-15

## 2021-11-15 ENCOUNTER — TELEPHONE (OUTPATIENT)
Dept: GASTROENTEROLOGY | Age: 51
End: 2021-11-15

## 2021-11-15 NOTE — TELEPHONE ENCOUNTER
From: Marisol Parker  To: Kateryna Bernal  Sent: 11/15/2021 1:15 PM EST  Subject: Colonoscopy    Barbara-    Please open attachment and follow Instructions.  If you have any question please call back at [phone] opt [phone]

## 2021-11-15 NOTE — TELEPHONE ENCOUNTER
Writer called and spoke with patient regarding rescheduling procedure. Patient reschedule for this Friday [email or web address] with arrival time at 7am. Patient given 2day extended golytely bowel prep which patient already has at her home from previous scheduled procedure. All procedure and bowel prep sent to patient via [employer]. Patient voice understanding regarding all instructions for bowel prep and preocedure.

## 2021-11-15 NOTE — TELEPHONE ENCOUNTER
Patient called and requested to reschedule her colonoscopy due to her mom having a procedure the next day. Patient is willing to do procedure sooner, or can be pushed out and states she is willing to go to any location.     Please call patient to reschedule    Thank you

## 2021-11-16 ENCOUNTER — HOSPITAL ENCOUNTER (OUTPATIENT)
Dept: PSYCHIATRY | Age: 51
Setting detail: THERAPIES SERIES
Discharge: HOME | End: 2021-11-16
Payer: MEDICARE

## 2021-11-16 PROCEDURE — 90837 PSYTX W PT 60 MINUTES: CPT | Performed by: SOCIAL WORKER
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
## 2021-11-16 NOTE — TELEPHONE ENCOUNTER
Bowel prep sent [address] again and Emailed to patient at [email or web address] Writer called patient to verify she received bowel prep via [employer],patient confirmed.

## 2021-11-16 NOTE — TELEPHONE ENCOUNTER
Pt LVM wanting to know if she is supposed to take dulcolax day prior to procedure because it is not listed on her instructions.

## 2021-11-16 NOTE — TELEPHONE ENCOUNTER
Writer called patient back, informed patient that the dulcolax was in her instructions and the Rx was sent. Patient voice understanding.

## 2021-11-17 ENCOUNTER — HOSPITAL ENCOUNTER (OUTPATIENT)
Age: 51
Setting detail: SPECIMEN
Discharge: HOME | End: 2021-11-17

## 2021-11-17 DIAGNOSIS — N39.41 URGE INCONTINENCE OF URINE: ICD-10-CM

## 2021-11-17 DIAGNOSIS — E87.1 HYPONATREMIA: ICD-10-CM

## 2021-11-17 LAB
ANION GAP SERPL CALCULATED.3IONS-SCNC: 14 MMOL/L (ref 9–17)
BUN BLDV-MCNC: 12 MG/DL (ref 6–20)
BUN/CREAT BLD: ABNORMAL (ref 9–20)
CALCIUM SERPL-MCNC: 9.5 MG/DL (ref 8.6–10.4)
CHLORIDE BLD-SCNC: 93 MMOL/L (ref 98–107)
CO2: 23 MMOL/L (ref 20–31)
CREAT SERPL-MCNC: 0.65 MG/DL (ref 0.5–0.9)
GFR AFRICAN AMERICAN: >60 ML/MIN
GFR NON-AFRICAN AMERICAN: >60 ML/MIN
GFR SERPL CREATININE-BSD FRML MDRD: ABNORMAL ML/MIN/{1.73_M2}
GFR SERPL CREATININE-BSD FRML MDRD: ABNORMAL ML/MIN/{1.73_M2}
GLUCOSE BLD-MCNC: 167 MG/DL (ref 70–99)
POTASSIUM SERPL-SCNC: 5.7 MMOL/L (ref 3.7–5.3)
SODIUM BLD-SCNC: 130 MMOL/L (ref 135–144)

## 2021-11-18 ENCOUNTER — ANESTHESIA EVENT (OUTPATIENT)
Dept: ENDOSCOPY | Age: 51
End: 2021-11-18
Payer: MEDICARE

## 2021-11-19 ENCOUNTER — ANESTHESIA (OUTPATIENT)
Dept: ENDOSCOPY | Age: 51
End: 2021-11-19
Payer: MEDICARE

## 2021-11-19 ENCOUNTER — HOSPITAL ENCOUNTER (OUTPATIENT)
Age: 51
Setting detail: OUTPATIENT SURGERY
Discharge: HOME | End: 2021-11-19
Attending: INTERNAL MEDICINE | Admitting: INTERNAL MEDICINE
Payer: MEDICARE

## 2021-11-19 VITALS
WEIGHT: 140 LBS | RESPIRATION RATE: 15 BRPM | DIASTOLIC BLOOD PRESSURE: 108 MMHG | BODY MASS INDEX: 23.32 KG/M2 | HEIGHT: 65 IN | HEART RATE: 80 BPM | OXYGEN SATURATION: 93 % | TEMPERATURE: 97.7 F | SYSTOLIC BLOOD PRESSURE: 153 MMHG

## 2021-11-19 VITALS
DIASTOLIC BLOOD PRESSURE: 89 MMHG | SYSTOLIC BLOOD PRESSURE: 133 MMHG | OXYGEN SATURATION: 99 % | RESPIRATION RATE: 14 BRPM

## 2021-11-19 PROCEDURE — 45390 COLONOSCOPY W/RESECTION: CPT | Performed by: INTERNAL MEDICINE

## 2021-11-19 PROCEDURE — 2709999900 HC NON-CHARGEABLE SUPPLY: Performed by: INTERNAL MEDICINE

## 2021-11-19 PROCEDURE — 3700000000 HC ANESTHESIA ATTENDED CARE: Performed by: INTERNAL MEDICINE

## 2021-11-19 PROCEDURE — 2580000003 HC RX 258: Performed by: SPECIALIST

## 2021-11-19 PROCEDURE — 3609010200 HC COLONOSCOPY ABLATION TUMOR POLYP/OTHER LES: Performed by: INTERNAL MEDICINE

## 2021-11-19 PROCEDURE — 7100000010 HC PHASE II RECOVERY - FIRST 15 MIN: Performed by: INTERNAL MEDICINE

## 2021-11-19 PROCEDURE — 7100000011 HC PHASE II RECOVERY - ADDTL 15 MIN: Performed by: INTERNAL MEDICINE

## 2021-11-19 PROCEDURE — 88305 TISSUE EXAM BY PATHOLOGIST: CPT

## 2021-11-19 PROCEDURE — 2580000003 HC RX 258: Performed by: INTERNAL MEDICINE

## 2021-11-19 PROCEDURE — 3700000001 HC ADD 15 MINUTES (ANESTHESIA): Performed by: INTERNAL MEDICINE

## 2021-11-19 PROCEDURE — 6360000002 HC RX W HCPCS: Performed by: SPECIALIST

## 2021-11-19 RX ORDER — SODIUM CHLORIDE 9 MG/ML
INJECTION, SOLUTION INTRAVENOUS CONTINUOUS
Status: DISCONTINUED | OUTPATIENT
Start: 2021-11-19 | End: 2021-11-19 | Stop reason: HOSPADM

## 2021-11-19 RX ORDER — ONDANSETRON 2 MG/ML
INJECTION INTRAMUSCULAR; INTRAVENOUS PRN
Status: DISCONTINUED | OUTPATIENT
Start: 2021-11-19 | End: 2021-11-19 | Stop reason: SDUPTHER

## 2021-11-19 RX ORDER — PROPOFOL 10 MG/ML
INJECTION, EMULSION INTRAVENOUS CONTINUOUS PRN
Status: DISCONTINUED | OUTPATIENT
Start: 2021-11-19 | End: 2021-11-19 | Stop reason: SDUPTHER

## 2021-11-19 RX ORDER — PROPOFOL 10 MG/ML
INJECTION, EMULSION INTRAVENOUS PRN
Status: DISCONTINUED | OUTPATIENT
Start: 2021-11-19 | End: 2021-11-19 | Stop reason: SDUPTHER

## 2021-11-19 RX ORDER — SODIUM CHLORIDE 9 MG/ML
INJECTION, SOLUTION INTRAVENOUS CONTINUOUS PRN
Status: DISCONTINUED | OUTPATIENT
Start: 2021-11-19 | End: 2021-11-19 | Stop reason: SDUPTHER

## 2021-11-19 RX ORDER — MIDAZOLAM HYDROCHLORIDE 1 MG/ML
INJECTION INTRAMUSCULAR; INTRAVENOUS PRN
Status: DISCONTINUED | OUTPATIENT
Start: 2021-11-19 | End: 2021-11-19 | Stop reason: SDUPTHER

## 2021-11-19 RX ADMIN — MIDAZOLAM HYDROCHLORIDE 2 MG: 1 INJECTION, SOLUTION INTRAMUSCULAR; INTRAVENOUS at 08:35

## 2021-11-19 RX ADMIN — ONDANSETRON 4 MG: 2 INJECTION, SOLUTION INTRAMUSCULAR; INTRAVENOUS at 08:53

## 2021-11-19 RX ADMIN — SODIUM CHLORIDE: 9 INJECTION, SOLUTION INTRAVENOUS at 07:37

## 2021-11-19 RX ADMIN — PROPOFOL 50 MG: 10 INJECTION, EMULSION INTRAVENOUS at 08:35

## 2021-11-19 RX ADMIN — PROPOFOL 120 MCG/KG/MIN: 10 INJECTION, EMULSION INTRAVENOUS at 08:35

## 2021-11-19 RX ADMIN — SODIUM CHLORIDE: 9 INJECTION, SOLUTION INTRAVENOUS at 08:32

## 2021-11-19 ASSESSMENT — PAIN SCALES - GENERAL
PAINLEVEL_OUTOF10: 0

## 2021-11-19 ASSESSMENT — LIFESTYLE VARIABLES: SMOKING_STATUS: 1

## 2021-11-19 ASSESSMENT — PAIN - FUNCTIONAL ASSESSMENT: PAIN_FUNCTIONAL_ASSESSMENT: 0-10

## 2021-11-19 NOTE — ANESTHESIA POSTPROCEDURE EVALUATION
Department of Anesthesiology  Postprocedure Note    Patient: Kleber Gentile  MRN: [id number]  YOB: 1970  Date of evaluation: 11/19/2021  Time:  1:35 PM     Procedure Summary     Date: 11/19/21 Room / Location: [facility] 03 / [facility]    Anesthesia Start: 0424 Anesthesia Stop: 0914    Procedure: COLONOSCOPY W/ ENDOSCOPIC MUCOSAL RESECTION (N/A ) Diagnosis: (CHRONIC PANCREATITIS)    Surgeons: Thor North MD Responsible Provider: Ashley Arias MD    Anesthesia Type: MAC ASA Status: 3          Anesthesia Type: MAC    Marcell Phase I: Marcell Score: 10    Marcell Phase II: Marcell Score: 10    Last vitals: Reviewed and per EMR flowsheets.        Anesthesia Post Evaluation    Patient location during evaluation: PACU  Patient participation: complete - patient participated  Level of consciousness: awake and alert  Pain score: 0  Nausea & Vomiting: no nausea  Cardiovascular status: hemodynamically stable  Respiratory status: room air

## 2021-11-19 NOTE — ANESTHESIA PRE PROCEDURE
Department of Anesthesiology  Preprocedure Note       Name:  Evelyne Lesch   Age:  45 y.o.  :  1970                                          MRN:  [id number]         Date:  2021      Surgeon: Georgette Felton):  Catherine Patel MD    Procedure: Procedure(s):  COLONOSCOPY DIAGNOSTIC    Department of Anesthesiology  Pre-Anesthesia Evaluation/Consultation         Name:  Evelyne Lesch                                         Age:  45 y.o.   MRN:  [id number]             Medications  Current Facility-Administered Medications   Medication Dose Route Frequency Provider Last Rate Last Admin    0.9 % sodium chloride infusion   IntraVENous Continuous Catherine Patel MD           No Known Allergies  Patient Active Problem List   Diagnosis    Acute bronchitis    Reflex sympathetic dystrophy of lower limb    Essential hypertension    Nicotine addiction    Psychophysiological insomnia    Anxiety    Alcoholic peripheral neuropathy (HCC)    Hypokalemia    Macrocytic anemia    Hypomagnesemia    Tobacco use    Ambulatory dysfunction    Seizure disorder ([address]    COPD with acute exacerbation ([address]    Paresthesia of lower extremity    Acute respiratory failure with hypoxia (HCC)    Pancreatic cyst    Recurrent major depressive disorder (HCC)    Elevated CA 19-9 level    Perineal mass, female    Esophagitis candidal     Condyloma    Astrocytoma (HCC) - 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24, the patient underwent 2 surgical resections without known recurrence    Alcohol use disorder, severe, in early remission ([address]    Metabolic encephalopathy    AMAN (generalized anxiety disorder)    Major depressive disorder, recurrent severe without psychotic features (HCC)    Esophageal dysphagia    Chronic peripheral neuropathic pain    History of alcohol abuse    History of petit-mal seizures    Intractable episodic tension-type headache    Personal history of astrocytoma    Multilevel spine pain    Chronic pain syndrome    Marijuana abuse    Severe sepsis (HCC)    Closed fracture of fifth thoracic vertebra (HCC)    Diverticulitis of large intestine with abscess without bleeding    Elevated brain natriuretic peptide (BNP) level    Elevated alkaline phosphatase level    Chronic pancreatitis (HCC)    Erythema ab igne    Compression fracture of thoracic vertebra (HCC)    Pathological compression fracture of lumbar vertebra with delayed healing    Metabolic acidosis with increased anion gap and accumulation of organic acids    Community acquired pneumonia of left lower lobe of lung    Septicemia ([address]    Alcohol-induced acute pancreatitis    Fluid collection of pancreas    Diverticulitis of large intestine without perforation or abscess with bleeding    Pseudocyst of pancreas    Bandemia    [facility]epsis [facility]    Acute recurrent pancreatitis    Atelectasis of left lung    Hyponatremia    Iron deficiency anemia     Past Medical History:   Diagnosis Date    Acute respiratory failure with hypoxia ([facility]) 10/16/2020    Alcohol withdrawal syndrome, with delirium ([address] 12/14/2019    Alcoholism ([address]     Anemia 10/2020    GI bleed    Anxiety     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24, the patient underwent 2 surgical resections without known recurrence 10/23/2020    Closed fracture of lateral portion of left tibial plateau 92/30/4725    COPD (chronic obstructive pulmonary disease) ([facility])     CO2 retainer, on Bipap at night for this, Dr. Cameron Faustin ( last visit 11/20/2020 and note on chart )    Depression     bipolar, major depressive disorder, ptsd, anxiety    Dysphagia     GI bleed 10/2020    Hypertension     Memory loss     Oxygen dependent     USES AT NIGHT - 3L/MIN    Pain, joint, ankle and foot     Pancreatic lesion 10/2020    Dr. River Valle working up pt    Peripheral neuropathy     Seizures ([address]     LAST SEIZURE 3/2020 - FEELS IT WAS FROM ALCOHOL WITHDRAWL    Tension headache     Under care of team 09/29/2021 [facility]    Under care of team 09/29/2021    pain management-Hamzah Martines-nery jimenez-last visit sep 2021    Under care of team 09/29/2021    pulmonology-Dr Dueñas-[facility]-due for visit oct 26/2021    Under care of team 09/29/2021    psych-bahnfeldt NP-telemed-last visit 10/ 2021    Under care of team 09/29/2021    fg-[address]-last visit aug 2021    Under care of team     NEPHROLOGY - DR. CHAPMAN     Wears glasses     Wears partial dentures     UPPER    Wellness examination 09/29/2021    pcp-Ludivina Grimm-[address]-last visit june 2021     Past Surgical History:   Procedure Laterality Date    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    COLONOSCOPY N/A 10/22/2020    COLONOSCOPY DIAGNOSTIC performed by Linda Kingston MD at [address]  07/28/2021    CT ABSCESS DRAIN SUBCUTANEOUS 7/28/2021 [facility] CT SCAN    ENDOSCOPIC ULTRASOUND (LOWER) N/A 12/09/2020    ENDOSCOPIC ULTRASOUND, UPPER WITH LINEAR SCOPE FOR BIOPSY OF MASS ON HEAD OF PANCREAS performed by Angelito Alcaraz MD at [address] [facility] ERCP  08/11/2021    ERCP N/A 08/11/2021    ERCP STENT INSERTION performed by Delphine Johnson MD at [facility]    ERCP  08/11/2021    ERCP SPHINCTER/PAPILLOTOMY performed by Delphine Johnson MD at [facility]    ERCP  10/21/2021    ERCP STONE REMOVAL    ERCP N/A 10/21/2021    ERCP STENT REMOVAL/EXCHANGE performed by Delphine Johnson MD at [address] [place] [address] ERCP  10/21/2021    ERCP STONE REMOVAL performed by Delphine Johnson MD at [address] [place] [address] ERCP  10/21/2021    ERCP ENDOSCOPIC RETROGRADE CHOLANGIOPANCREATOGRAPHY DIRECT VISUALIZATION performed by Delphine Johnson MD at [facility] Left 07/03/2013    ORIF tibial plateau    FRACTURE SURGERY Right     small finger metacarpal fracture    HAND SURGERY      pins    HYSTERECTOMY  2003    SPINE SURGERY N/A 09/10/2021    KYPHOPLASTY lumbar L5 performed by Maria Luisa Crystal MD at [place] [address] N/A 10/22/2020    EGD BIOPSY performed by Catehrine Patel MD at  [facility] N/A 2021    EGD BIOPSY performed by Catherine Patel MD at [address] [place] [address] N/A 2021    ENDOSCOPIC ULTRASOUND, LINEAR SCOPE performed by Amarilis Hall MD at [facility]     Social History     Tobacco Use    Smoking status: Current Every Day Smoker     Packs/day: 1.00     Years: 30.00     Pack years: 30.00     Types: Cigarettes    Smokeless tobacco: Never Used   Vaping Use    Vaping Use: Never used   Substance Use Topics    Alcohol use: Not Currently     Alcohol/week: 0.0 standard drinks    Drug use: Not Currently     Frequency: 2.0 times per week     Comment: last time 21         Vital Signs (Current)   Vitals:    21   BP: (!) 140/81   Pulse: (!) 36   Resp: 15   SpO2: 93%     Vital Signs Statistics (for past 48 hrs)     Pulse  Av  Min: 39   Min taken time: 21  Max: 39   Max taken time: 21  Resp  Avg: 15  Min: 15   Min taken time: 21  Max: 15   Max taken time: 21  BP  Min: 140/81   Min taken time: 21  Max: 140/81   Max taken time: 21  SpO2  Av %  Min: 93 %   Min taken time: 21  Max: 93 %   Max taken time: 21  BP Readings from Last 3 Encounters:   21 (!) 140/81   21 118/86   21 132/80       BMI  Body mass index is 23.3 kg/m².     CBC   Lab Results   Component Value Date    WBC 10.4 2021    RBC 4.45 2021    RBC 4.16 2012    HGB 12.7 2021    HCT 40.2 2021    MCV 90.3 2021    RDW 13.8 2021     2021     2012       CMP    Lab Results   Component Value Date     2021    K 5.7 2021    CL 93 2021    CO2 23 2021    BUN 12 2021    CREATININE 0.65 2021    GFRAA >60 11/17/2021    LABGLOM >60 11/17/2021    GLUCOSE 167 11/17/2021    GLUCOSE 92 04/30/2012    PROT 7.3 10/12/2021    CALCIUM 9.5 11/17/2021    BILITOT 0.19 10/12/2021    ALKPHOS 211 10/12/2021    AST 14 10/12/2021    ALT 7 10/12/2021       BMP    Lab Results   Component Value Date     11/17/2021    K 5.7 11/17/2021    CL 93 11/17/2021    CO2 23 11/17/2021    BUN 12 11/17/2021    CREATININE 0.65 11/17/2021    CALCIUM 9.5 11/17/2021    GFRAA >60 11/17/2021    LABGLOM >60 11/17/2021    GLUCOSE 167 11/17/2021    GLUCOSE 92 04/30/2012       POC Testing  No results for input(s): POCGLU, POCNA, POCK, POCCL, POCBUN, POCHEMO, POCHCT in the last 72 hours. Coags    Lab Results   Component Value Date    PROTIME 10.9 08/09/2021    INR 1.0 08/09/2021    APTT 24.6 08/09/2021       HCG (If Applicable) No results found for: PREGTESTUR, PREGSERUM, HCG, HCGQUANT     ABGs No results found for: PHART, PO2ART, PND5KEU, ZNV2ZQK, BEART, D2OKEVQN     Type & Screen (If Applicable)  No results found for: [facility]    Radiology (If Applicable)    Cardiac Testing (If Applicable) EF 55%    EKG (If Applicable) first degree,anteroseptal MI          Medications prior to admission:   Prior to Admission medications    Medication Sig Start Date End Date Taking?  Authorizing Provider   traZODone (DESYREL) 50 MG tablet TAKE ONE TABLET BY MOUTH ONCE NIGHTLY AS NEEDED FOR SLEEP 11/11/21   LOI Fragoso NP   busPIRone (BUSPAR) 30 MG tablet TAKE ONE TABLET BY MOUTH TWICE A DAY WITH MEALS 11/11/21   LOI Fragoso NP   polyethylene glycol (GOLYTELY) 236 g solution Follow instructions provided by physicians office 11/8/21   Will Rios MD   bisacodyl (BISACODYL) 5 MG EC tablet Take 4 tabs at 10am the day prior to Colonoscopy 11/8/21   Will Rios MD   polyethylene glycol Jimmie Shruthi) 17 GM/SCOOP powder Use as directed following your patient instructions given by office 11/8/21   Will Rios MD   fluticasone-umeclidin-vilant (TRELEGY ELLIPTA) 100-62.5-25 MCG/INH AEPB Inhale 1 puff into the lungs daily 11/1/21   Ludivina Jaimes MD   predniSONE (DELTASONE) 10 MG tablet 4 tabs by mouth daily for 3 days, then 3 tabs daily for 3 days, then 2 tabs daily for 3 days, then 1 tab daily till gone. 11/1/21   Ludivina Jaimes MD   denosumab (PROLIA) 60 MG/ML SOSY SC injection Inject 1 mL into the skin every 6 months 11/1/21   Ludivina Jaimes MD   ondansetron (ZOFRAN ODT) 4 MG disintegrating tablet Take 1 tablet by mouth every 8 hours as needed for Nausea or Vomiting 10/27/21   Ludivina Jaimes MD   sodium chloride 1 g tablet Take 1 tablet by mouth 4 times daily 10/25/21   Matt Roa MD   CREON 12156-15017 units delayed release capsule TAKE ONE CAPSULE BY MOUTH THREE TIMES A DAY WITH MEALS 10/20/21   Ludivina Jaimes MD   pregabalin (LYRICA) 200 MG capsule Take 1 capsule by mouth 3 times daily for 180 days.  10/9/21 4/7/22  Sangeeta Hoyos MD   carBAMazepine (TEGRETOL) 200 MG tablet TAKE ONE TABLET BY MOUTH THREE TIMES A DAY 10/6/21   Ludivina Jaimes MD   topiramate (TOPAMAX) 50 MG tablet TAKE ONE TABLET BY MOUTH TWICE A DAY 10/6/21   Ludivina Jaimes MD   spironolactone (ALDACTONE) 50 MG tablet TAKE ONE TABLET BY MOUTH DAILY 10/6/21   Ludivina Jaimes MD   amLODIPine (NORVASC) 5 MG tablet TAKE ONE TABLET BY MOUTH DAILY 10/6/21   Ludivina Jaimes MD   escitalopram (LEXAPRO) 5 MG tablet TAKE ONE TABLET BY MOUTH DAILY 10/6/21   LOI Roberts NP   DULoxetine (CYMBALTA) 60 MG extended release capsule Take 60 mg by mouth daily  9/7/21   Historical Provider, MD   simethicone (MYLICON) 80 MG chewable tablet Take 1 tablet by mouth 4 times daily as needed for Flatulence 8/27/21   Sunday Painting MD   hydrOXYzine (ATARAX) 25 MG tablet Take 1 tablet by mouth 3 times daily as needed for Anxiety  Patient taking differently: Take 25 mg by mouth 3 times daily  8/25/21   LOI Roberts NP   metoclopramide (REGLAN) 5 MG tablet Take 1 tablet by mouth 3 times daily 8/19/21   Ange Yun MD   omeprazole (PRILOSEC) 40 MG delayed release capsule Take 1 capsule by mouth 2 times daily 8/19/21   Ange Yun MD   rOPINIRole (REQUIP) 1 MG tablet Take 1 tablet by mouth nightly 4/1/21   Ludivina Pruitt MD   budesonide-formoterol [facility]) 160-4.5 MCG/ACT AERO Inhale 2 puffs into the lungs 2 times daily 3/31/21   Ludivina Pruitt MD   tiotropium (SPIRIVA RESPIMAT) 2.5 MCG/ACT AERS inhaler Inhale 2 puffs into the lungs daily 2/26/21 11/1/21  Arsenio Posey MD   albuterol sulfate HFA (VENTOLIN HFA) 108 (90 Base) MCG/ACT inhaler Inhale 2 puffs into the lungs 4 times daily as needed for Wheezing 6/11/20   Ludivina Pruitt MD   vitamin D (ERGOCALCIFEROL) 36859 units CAPS capsule Take 1 capsule by mouth once a week 6/19/19   Ludivina Pruitt MD   folic acid (FOLVITE) 1 MG tablet Take 1 tablet by mouth daily 6/19/19   Ludivina Pruitt MD       Current medications:    No current facility-administered medications for this visit. No current outpatient medications on file.      Facility-Administered Medications Ordered in Other Visits   Medication Dose Route Frequency Provider Last Rate Last Admin    0.9 % sodium chloride infusion   IntraVENous Continuous Ange Yun MD           Allergies:  No Known Allergies    Problem List:    Patient Active Problem List   Diagnosis Code    Acute bronchitis J20.9    Reflex sympathetic dystrophy of lower limb G90.529    Essential hypertension I10    Nicotine addiction F17.200    Psychophysiological insomnia F51.04    Anxiety I60.9    Alcoholic peripheral neuropathy (HCC) G62.1    Hypokalemia E87.6    Macrocytic anemia D53.9    Hypomagnesemia E83.42    Tobacco use Z72.0    Ambulatory dysfunction R26.2    Seizure disorder ([address] G40.909    COPD with acute exacerbation ([address] J44.1    Paresthesia of lower extremity R20.2    Acute respiratory failure with hypoxia ([address] J96.01    Pancreatic cyst K86.2    Recurrent major depressive disorder (HCC) F33.9    Elevated CA 19-9 level R97.8    Perineal mass, female N90.89    Esophagitis candidal  B37.81    Condyloma A63.0    Astrocytoma ([facility] - 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24, the patient underwent 2 surgical resections without known recurrence C71.9    Alcohol use disorder, severe, in early remission ([facility] P46.51    Metabolic encephalopathy E65.48    AMAN (generalized anxiety disorder) F41.1    Major depressive disorder, recurrent severe without psychotic features ([facility] F33.2    Esophageal dysphagia R13.19    Chronic peripheral neuropathic pain M79.2, G89.29    History of alcohol abuse F10.11    History of petit-mal seizures Z86.69    Intractable episodic tension-type headache [address]. [address] history of astrocytoma Z85.841    Multilevel spine pain M54.9    Chronic pain syndrome G89.4    Marijuana abuse F12.10    Severe sepsis ([facility]) A41.9, R65.20    Closed fracture of fifth thoracic vertebra ([facility]) S22.059A    Diverticulitis of large intestine with abscess without bleeding K57.20    Elevated brain natriuretic peptide (BNP) level R79.89    Elevated alkaline phosphatase level R74.8    Chronic pancreatitis ([facility]) K86.1    Erythema ab igne L59.0    Compression fracture of thoracic vertebra ([facility]) S22.000A    Pathological compression fracture of lumbar vertebra with delayed healing H24.07DE    Metabolic acidosis with increased anion gap and accumulation of organic acids E87.2    Community acquired pneumonia of left lower lobe of lung J18.9    Septicemia ([facility]) A41.9    Alcohol-induced acute pancreatitis K85.20    Fluid collection of pancreas K86.89    Diverticulitis of large intestine without perforation or abscess with bleeding K57.33    Pseudocyst of pancreas K86.3    Bandemia D72.825    Sepsis ([facility]) A41.9    Acute recurrent pancreatitis K85.90    Atelectasis of left lung J98.11    Hyponatremia E87.1    Iron deficiency anemia D50.9 Past Medical History:        Diagnosis Date    Acute respiratory failure with hypoxia ([facility] 10/16/2020    Alcohol withdrawal syndrome, with delirium ([facility] 12/14/2019    Alcoholism ([facility]     Anemia 10/2020    GI bleed    Anxiety     Astrocytoma ([facility] - 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24, the patient underwent 2 surgical resections without known recurrence 10/23/2020    Closed fracture of lateral portion of left tibial plateau 30/83/7825    COPD (chronic obstructive pulmonary disease) ([facility]     CO2 retainer, on Bipap at night for this, Dr. Saman Bailey ( last visit 11/20/2020 and note on chart )    Depression     bipolar, major depressive disorder, ptsd, anxiety    Dysphagia     GI bleed 10/2020    Hypertension     Memory loss     Oxygen dependent     USES AT NIGHT - 3L/MIN    Pain, joint, ankle and foot     Pancreatic lesion 10/2020    Dr. Cricket Butterfield working up pt    Peripheral neuropathy     Seizures ([facility]     LAST SEIZURE 3/2020 - FEELS IT WAS FROM ALCOHOL WITHDRAWL    Tension headache     Under care of team 09/29/2021    neuro-Dr Coyle-[facility] ct-last visit sep 2021    Under care of team 09/29/2021    pain management-Hamzah Martines-nery ramirezia-last visit sep 2021    Under care of team 09/29/2021    pulmonology-Dr Dueñas-[facility]-due for visit oct 26/2021    Under care of team 09/29/2021    psych-bahnfeldt NP-telemed-last visit 10/ 2021    Under care of team 09/29/2021    [address]-last visit aug 2021    Under care of team     NEPHROLOGY - DR. CHAPMAN     Wears glasses     Wears partial dentures     UPPER    Wellness examination 09/29/2021    [facility]-last visit june 2021       Past Surgical History:        Procedure Laterality Date    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    COLONOSCOPY N/A 10/22/2020    COLONOSCOPY DIAGNOSTIC performed by Lee Goodpasture, MD at [place]  07/28/2021    CT ABSCESS DRAIN SUBCUTANEOUS 7/28/2021 [facility] CT SCAN    ENDOSCOPIC ULTRASOUND (LOWER) N/A 12/09/2020    ENDOSCOPIC ULTRASOUND, UPPER WITH LINEAR SCOPE FOR BIOPSY OF MASS ON HEAD OF PANCREAS performed by Grover Ritchie MD at [facility] ERCP  08/11/2021    ERCP N/A 08/11/2021    ERCP STENT INSERTION performed by Henok Hicks MD at [facility]    ERCP  08/11/2021    ERCP SPHINCTER/PAPILLOTOMY performed [facility]ok Hicks MD at [facility]    ERCP  10/21/2021    ERCP STONE REMOVAL    ERCP N/A 10/21/2021    ERCP STENT REMOVAL/EXCHANGE performed by Henok Hicks MD at [address] [facility] ERCP  10/21/2021    ERCP STONE REMOVAL performed by Henok Hicks MD at [address] [facility] ERCP  10/21/2021    ERCP ENDOSCOPIC RETROGRADE CHOLANGIOPANCREATOGRAPHY DIRECT VISUALIZATION performed by Henok Hicks MD at [address] Left 07/03/2013    ORIF tibial plateau    FRACTURE SURGERY Right     small finger metacarpal fracture    HAND SURGERY      pins    HYSTERECTOMY  2003    SPINE SURGERY N/A 09/10/2021    KYPHOPLASTY lumbar L5 performed by Alicia Barclay MD at [facility] 10/22/2020    EGD BIOPSY performed by Oswald Rivera MD at [address] [place] [address] N/A 04/05/2021    EGD BIOPSY performed by Oswald Rivera MD at [address] [place] [address] N/A 09/08/2021    ENDOSCOPIC ULTRASOUND, LINEAR SCOPE performed by Grover Ritchie MD at [facility]       Social History:    Social History     Tobacco Use    Smoking status: Current Every Day Smoker     Packs/day: 1.00     Years: 30.00     Pack years: 30.00     Types: Cigarettes    Smokeless tobacco: Never Used   Substance Use Topics    Alcohol use: Not Currently     Alcohol/week: 0.0 standard drinks                                Ready to quit: Not Answered  Counseling given: Not Answered      Vital Signs (Current): There were no vitals filed for this visit. anesthetic complications:   Airway: Mallampati: I        Dental:    (+) upper dentures and partials      Pulmonary:normal exam    (+) COPD:  sleep apnea: on CPAP,  current smoker                          ROS comment: Oxygen 3 liters / min at night  bipap for CO2 retention  30 pk yrs   Cardiovascular:    (+) hypertension:, past MI: > 6 months, MORENO:,                   Neuro/Psych:   (+) seizures: well controlled, neuromuscular disease (alcoholic neuropathy):, headaches: tension headaches, psychiatric history:depression/anxiety              ROS comment: RSD of lower limb  etoh use disorder  Neuropathy  RSD GI/Hepatic/Renal:            ROS comment: Chronic pancreatitis. Endo/Other:        (-) diabetes mellitus, no electrolyte abnormalities (chronic hyponatremia)               Abdominal:             Vascular: Other Findings:             Anesthesia Plan      MAC     ASA 3       Induction: intravenous. MIPS: Postoperative opioids intended and Prophylactic antiemetics administered. Anesthetic plan and risks discussed with patient. Plan discussed with CRNA.                 Blaire Herrera MD   11/19/2021

## 2021-11-19 NOTE — INTERVAL H&P NOTE
Pt Name: Joseph Sandoval  MRN: [id number]  YOB: 1970  Date of evaluation: 11/19/2021    I have reviewed the patient's history and physical examination completed pre-procedure on 10/21/21 for ERCP, stent removal.     No changes to history or on examination today, unless noted below. Pt presents today for scheduled colonoscopy. She states this is for \"follow-up\", last colonoscopy that I could see was from last October. She denies any acute changes or complaints with BMs, states no longer taking iron. Pt denies abdominal pain, nausea, vomiting, reflux, constipation, diarrhea, melena, hematochezia. She reports history of pancreatis and that another stent was placed last month. On exam today, no BLE edema. Throat with mild erythema. Denies any health changes since last month.      GEORGES HAMILTON, LOI - CNP  11/19/21  7:01 AM

## 2021-11-19 NOTE — OP NOTE
Operative Note      Patient: Cleve Penny  YOB: 1970  MRN: [id number]    Date of Procedure: 11/19/2021    Pre-Op Diagnosis: CHRONIC PANCREATITIS    Post-Op Diagnosis: Screening colonoscopy, medium/large rectosigmoid polyp s/p EMR       Procedure(s):  COLONOSCOPY W/ ENDOSCOPIC MUCOSAL RESECTION    Surgeon(s):  Lazarus Sayers, MD    Assistant:   First Assistant: Karolyn Kee RN    Anesthesia: Monitor Anesthesia Care    Estimated Blood Loss (mL): Minimal    Complications: None    Specimens:   ID Type Source Tests Collected by Time Destination   A : recto-sigmoid polyp Tissue Recto sigmoid SURGICAL PATHOLOGY Lazarus Sayers, MD 11/19/2021 8380        Implants:  * No implants in log *      Drains: * No LDAs found *              [place] [facility]     [facility] ENDOSCOPY     COLONOSCOPY    PROCEDURE DATE: 11/19/21    REFERRING PHYSICIAN: No ref. provider found     PRIMARY CARE PROVIDER: LIZBETH Sam MD    ATTENDING PHYSICIAN: Lazarus Sayers, MD     HISTORY: Ms. Cleve Penny is a 45 y.o. female who presents to the [facility] endoscopy unit for colonoscopy. The patient's clinical history is remarkable for anxiety, prior history of alcoholism, chronic pancreatitis, smoker, COPD, referred for diagnostic colonoscopy. She is currently medically stable and appropriate for the planned procedure. PREOPERATIVE DIAGNOSIS: screening for colon cancer. PROCEDURES:   Transanal Colonoscopy with polypectomy (snare cautery) using EMR technique    POSTPROCEDURE DIAGNOSIS:    FAIR PREP     1) Sessile appearing ~20mm polyp in the rectosigmoid colon. Orise lift was used, ~5cc total to lift the flat polyp. Margins identified. This was followed by hot snare polypectomy using a stiff snare. Tissue retrieved using EMR technique. 2) Moderate internal/external hemorrhoids.   3) External anorectal severe condyloma disease    MEDICATIONS:     MAC per anesthesia     EBL <10cc    INSTRUMENT: Olympus CF-H180 AL Pediatric flexible Colonoscope. PREPARATION: The nature and character of the procedure as well as risks, benefits, and alternatives were discussed with the patient and informed consent was obtained. Complications were said to include, but were not limited to: medication allergy, medication reaction, cardiovascular and respiratory problems, bleeding, perforation, infection, and/or missed diagnosis. Following arrival in the endoscopy room, the patient was placed in the left lateral decubitus position and final time-out accomplished in the presence of the nursing staff. Baseline vital signs were obtained and reviewed, and IV sedation was subsequently initiated. FINDINGS: Rectal examination demonstrated no significant visible external abnormality and digital palpation was unremarkable. Following adequate conscious sedation the colonoscope was introduced and advanced under direct visualization to the cecum, which was identified by the ileocecal valve and appendiceal orifice. The bowel preparation was felt to be FAIR. This included large amounts of green stool that was mostly able to be adequately irrigated and aspirated. Cecal intubation time was 8 minutes. Once maximally inserted, the endoscope was withdrawn and the mucosa was carefully inspected. The mucosal exam was revealed rectosigmoid polyp, EMR used to remove polyp. Retroflexion was performed in the rectum and moderate hemorrhoids. Withdrawal time was 23 minutes. IMPRESSION:     FAIR PREP     1) Sessile appearing ~20mm polyp in the rectosigmoid colon. Orise lift was used, ~5cc total to lift the flat polyp. Margins identified. This was followed by hot snare polypectomy using a stiff snare. Tissue retrieved using EMR technique. 2) Moderate internal/external hemorrhoids. 3) External anorectal severe condyloma disease    RECOMMENDATIONS:   1) Follow up with referring provider, as previously scheduled.    2) Repeat Colonoscopy in 6 months for post-EMR surveillance. Follow up path in GI clinic. Avoid smoking. Avoid heavy lifting and NSAIDs for next 72 hrs        Catherine Patel MD  [facility]   11/19/21    This note is created with the assistance of a speech recognition program.  While intending to generate a document that actually reflects the content of the visit, the document can still have some errors including those of syntax and sound a like substitutions which may escape proof reading. It such instances, actual meaning can be extrapolated by contextual diversion. The patient was counseled at length about the risks of marie Covid-19 during their perioperative period and any recovery window from their procedure. The patient was made aware that marie Covid-19  may worsen their prognosis for recovering from their procedure  and lend to a higher morbidity and/or mortality risk. All material risks, benefits, and reasonable alternatives including postponing the procedure were discussed. The patient DOES wish to proceed with the procedure at this time.         Electronically signed by Catherine Patel MD on 11/19/2021 at 9:07 AM

## 2021-11-22 ENCOUNTER — HOSPITAL ENCOUNTER (OUTPATIENT)
Age: 51
Discharge: HOME | End: 2021-11-22
Payer: MEDICARE

## 2021-11-22 ENCOUNTER — HOSPITAL ENCOUNTER (OUTPATIENT)
Dept: PSYCHIATRY | Age: 51
Setting detail: THERAPIES SERIES
Discharge: HOME | End: 2021-11-22
Payer: MEDICARE

## 2021-11-22 DIAGNOSIS — E87.5 HYPERKALEMIA: ICD-10-CM

## 2021-11-22 LAB
ANION GAP SERPL CALCULATED.3IONS-SCNC: 14 MMOL/L (ref 9–17)
BUN BLDV-MCNC: 9 MG/DL (ref 6–20)
BUN/CREAT BLD: NORMAL (ref 9–20)
CALCIUM SERPL-MCNC: 9.3 MG/DL (ref 8.6–10.4)
CHLORIDE BLD-SCNC: 99 MMOL/L (ref 98–107)
CO2: 22 MMOL/L (ref 20–31)
CREAT SERPL-MCNC: 0.52 MG/DL (ref 0.5–0.9)
GFR AFRICAN AMERICAN: >60 ML/MIN
GFR NON-AFRICAN AMERICAN: >60 ML/MIN
GFR SERPL CREATININE-BSD FRML MDRD: NORMAL ML/MIN/{1.73_M2}
GFR SERPL CREATININE-BSD FRML MDRD: NORMAL ML/MIN/{1.73_M2}
GLUCOSE BLD-MCNC: 95 MG/DL (ref 70–99)
POTASSIUM SERPL-SCNC: 4.3 MMOL/L (ref 3.7–5.3)
SODIUM BLD-SCNC: 135 MMOL/L (ref 135–144)
SURGICAL PATHOLOGY REPORT: NORMAL

## 2021-11-22 PROCEDURE — 90837 PSYTX W PT 60 MINUTES: CPT | Performed by: SOCIAL WORKER

## 2021-11-22 PROCEDURE — 80048 BASIC METABOLIC PNL TOTAL CA: CPT

## 2021-11-22 PROCEDURE — 36415 COLL VENOUS BLD VENIPUNCTURE: CPT

## 2021-11-23 ENCOUNTER — TELEMEDICINE (OUTPATIENT)
Dept: PSYCHIATRY | Age: 51
End: 2021-11-23

## 2021-11-23 DIAGNOSIS — F33.1 MODERATE EPISODE OF RECURRENT MAJOR DEPRESSIVE DISORDER (HCC): ICD-10-CM

## 2021-11-23 DIAGNOSIS — F41.1 GAD (GENERALIZED ANXIETY DISORDER): Primary | ICD-10-CM

## 2021-11-23 DIAGNOSIS — F10.21 ALCOHOL USE DISORDER, SEVERE, IN EARLY REMISSION (HCC): ICD-10-CM

## 2021-11-23 NOTE — PSYCHOTHERAPY
[facility] Therapy Note in [facility], [address]   11/22/21  2:00 PM   Nancie Fish  1970  [id number]    Time spent with Patient: 60 minutes      [facility] Discussed with patient model of service to include the limits of confidentiality (i.e. abuse reporting, suicide intervention, etc.) and short-term intervention focused approach. Pt indicated understanding. S:    Pt presented to session in a slightly anxious mood and eager to participate in art projects. Therapist and pt utilized art techniques to help guide discussion, have pt express herself, and make her feel more comfortable engaging in discussion. Pt and therapist began to process some of pt's past and pt reports that she does have a \"wall up\" between herself and her emotions and she does not always fully accept her emotions. Pt and therapist discussed and addressed pt's belief that she is weak and \"allowed\" people to use or abuse her. Therapist used CBT techniques to lead pt in combatting these thoughts after pt identified that she \"didn't know how to say no. \"  Pt and therapist processed pt's missing and lack of desire for relationships (romantic and platonic) and pt's lack of trust in people. Therapist lead pt in grounding techniques when she became emotionally distressed and then taught pt how to use them herself. Therapist offered emotional support and used active listening to help pt process through difficult emotions and memories.           O:  MSE:     Appearance    alert, cooperative, crying, moderate distress  Appetite normal  Sleep disturbance No  Fatigue No  Loss of pleasure No  Impulsive behavior No  Speech    normal rate, normal volume and well articulated  Mood    Depressed, anxious  Worthless  Affect   anxious  Thought Content    intact  Thought Process    linear, goal directed and coherent  Associations    logical connections  Insight    Fair  Judgment Intact  Orientation    oriented to person, place, time, and general circumstances  Memory    recent and remote memory intact  Attention/Concentration    intact  Morbid ideation No  Suicide Assessment    no suicidal ideation    A:    Pt was engaged in therapy session and appeared more at ease when engaging in art. Pt had requested to do finger painting and often needs to be treated with a childlike gentleness. Pt appears to be reverting back to some childhood needs ie: physical touch/affection/cradling, rocking, finger painting. Pt would benefit from addressing trauma she experienced in childhood and working on healing her \"inner child\" as she still holds on to a lot of shame/guilt/hurt from her childhood.         Visit Diagnosis:   PTSD      History from Medical Record:        Diagnosis Date    Acute respiratory failure with hypoxia ([address] 10/16/2020    Alcohol withdrawal syndrome, with delirium ([address] 12/14/2019    Alcoholism ([address]     Anemia 10/2020    GI bleed    Anxiety     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24, the patient underwent 2 surgical resections without known recurrence 10/23/2020    at age 24    Closed fracture of lateral portion of left tibial plateau 36/72/1242    COPD (chronic obstructive pulmonary disease) ([address]     CO2 retainer, on Bipap at night for this, Dr. Jose Morales ( last visit 11/20/2020 and note on chart )    Depression     bipolar, major depressive disorder, ptsd, anxiety    Dysphagia     GI bleed 10/2020    Hypertension     Memory loss     Oxygen dependent     USES AT NIGHT - 3L/MIN    Pain, joint, ankle and foot     Pancreatic lesion 10/2020    Dr. Jd Daniel working up pt    Peripheral neuropathy     Seizures ([facility]     LAST SEIZURE 3/2020 - FEELS IT WAS FROM ALCOHOL WITHDRAWL    Tension headache     Under care of team 09/29/2021    neuro-Dr Coyle-[facility]-last visit sep 2021    Under care of team 09/29/2021    pain management-Hamzah Beasley tony-last visit sep 2021    Under care of team 09/29/2021    pulmonology-Dr Dueñas-[facility]-due for visit oct 26/2021    Under care of team 09/29/2021    psych-moniquet NP-telemed-last visit 10/ 2021    Under care of team 09/29/2021    ae-[address]-last visit aug 2021    Under care of team     NEPHROLOGY - DR. CHAPMAN  Wears glasses     Wears partial dentures     UPPER    Wellness examination 09/29/2021    pcp-Ludivina Grimm-[facility] cornelio-last visit june 2021     Medications:   Current Outpatient Medications   Medication Sig Dispense Refill    traZODone (DESYREL) 50 MG tablet TAKE ONE TABLET BY MOUTH ONCE NIGHTLY AS NEEDED FOR SLEEP 30 tablet 0    busPIRone (BUSPAR) 30 MG tablet TAKE ONE TABLET BY MOUTH TWICE A DAY WITH MEALS 60 tablet 0    polyethylene glycol (GOLYTELY) 236 g solution Follow instructions provided by physicians office 4000 mL 0    bisacodyl (BISACODYL) 5 MG EC tablet Take 4 tabs at 10am the day prior to Colonoscopy 4 tablet 0    polyethylene glycol (GLYCOLAX) 17 GM/SCOOP powder Use as directed following your patient instructions given by office 51 g 0    fluticasone-umeclidin-vilant (TRELEGY ELLIPTA) 100-62.5-25 MCG/INH AEPB Inhale 1 puff into the lungs daily 1 each 5    predniSONE (DELTASONE) 10 MG tablet 4 tabs by mouth daily for 3 days, then 3 tabs daily for 3 days, then 2 tabs daily for 3 days, then 1 tab daily till gone. 30 tablet 0    denosumab (PROLIA) 60 MG/ML SOSY SC injection Inject 1 mL into the skin every 6 months 1 mL 1    ondansetron (ZOFRAN ODT) 4 MG disintegrating tablet Take 1 tablet by mouth every 8 hours as needed for Nausea or Vomiting 10 tablet 0    sodium chloride 1 g tablet Take 1 tablet by mouth 4 times daily 120 tablet 3    CREON 83453-58788 units delayed release capsule TAKE ONE CAPSULE BY MOUTH THREE TIMES A DAY WITH MEALS 90 capsule 1    pregabalin (LYRICA) 200 MG capsule Take 1 capsule by mouth 3 times daily for 180 days.  90 capsule 5    carBAMazepine (TEGRETOL) 200 MG tablet TAKE ONE TABLET BY MOUTH THREE TIMES A DAY 90 tablet 0    topiramate (TOPAMAX) 50 MG tablet TAKE ONE TABLET BY MOUTH TWICE A  tablet 1    spironolactone (ALDACTONE) 50 MG tablet TAKE ONE TABLET BY MOUTH DAILY 90 tablet 1    amLODIPine (NORVASC) 5 MG tablet TAKE ONE TABLET BY MOUTH DAILY 90 tablet 1    escitalopram (LEXAPRO) 5 MG tablet TAKE ONE TABLET BY MOUTH DAILY 30 tablet 1    DULoxetine (CYMBALTA) 60 MG extended release capsule Take 60 mg by mouth daily       simethicone (MYLICON) 80 MG chewable tablet Take 1 tablet by mouth 4 times daily as needed for Flatulence 180 tablet 3    hydrOXYzine (ATARAX) 25 MG tablet Take 1 tablet by mouth 3 times daily as needed for Anxiety (Patient taking differently: Take 25 mg by mouth 3 times daily ) 90 tablet 3    metoclopramide (REGLAN) 5 MG tablet Take 1 tablet by mouth 3 times daily 120 tablet 2    omeprazole (PRILOSEC) 40 MG delayed release capsule Take 1 capsule by mouth 2 times daily 60 capsule 2    rOPINIRole (REQUIP) 1 MG tablet Take 1 tablet by mouth nightly 90 tablet 1    budesonide-formoterol (SYMBICORT) 160-4.5 MCG/ACT AERO Inhale 2 puffs into the lungs 2 times daily 3 Inhaler 1    tiotropium (SPIRIVA RESPIMAT) 2.5 MCG/ACT AERS inhaler Inhale 2 puffs into the lungs daily 1 Inhaler 11    albuterol sulfate HFA (VENTOLIN HFA) 108 (90 Base) MCG/ACT inhaler Inhale 2 puffs into the lungs 4 times daily as needed for Wheezing 1 Inhaler 5    vitamin D (ERGOCALCIFEROL) 99399 units CAPS capsule Take 1 capsule by mouth once a week 12 capsule 1    folic acid (FOLVITE) 1 MG tablet Take 1 tablet by mouth daily 90 tablet 1     No current facility-administered medications for this encounter.        Social History:   Social History     Socioeconomic History    Marital status: Single     Spouse name: Not on file    Number of children: Not on file    Years of education: Not on file    Highest education level: Not on file   Occupational History    Not on file   Tobacco Use    Smoking status: Current Every Day Smoker     Packs/day: 1.00     Years: 30.00     Pack years: 30.00     Types: Cigarettes    Smokeless tobacco: Never Used   Vaping Use    Vaping Use: Never used   Substance and Sexual Activity    Alcohol use: Not Currently     Alcohol/week: 0.0 standard drinks    Drug use: Not Currently     Frequency: 2.0 times per week     Comment: 6 months ago    Sexual activity: Not Currently   Other Topics Concern    Not on file   Social History Narrative    Not on file     Social Determinants of Health     Financial Resource Strain: Medium Risk    Difficulty of Paying Living Expenses: Somewhat hard   Food Insecurity: No Food Insecurity    Worried About Running Out of Food in the Last Year: Never true    [place] of Food in the Last Year: Never true   Transportation Needs:     Lack of Transportation (Medical): Not on file    Lack of Transportation (Non-Medical): Not on file   Physical Activity:     Days of Exercise per Week: Not on file    Minutes of Exercise per Session: Not on file   Stress:     Feeling of Stress : Not on file   Social Connections:     Frequency of Communication with Friends and Family: Not on file    Frequency of Social Gatherings with Friends and Family: Not on file    Attends Taoism Services: Not on file    Active Member of [address] [place] [address] [employer] or Organizations: Not on file    Attends Club or Organization Meetings: Not on file    Marital Status: Not on file   Intimate Partner Violence:     Fear of Current or Ex-Partner: Not on file    Emotionally Abused: Not on file    Physically Abused: Not on file    Sexually Abused: Not on file   Housing Stability:     Unable to Pay for Housing in the Last Year: Not on file    Number of [place] in the Last Year: Not on file    Unstable Housing in the Last Year: Not on file       TOBACCO:   reports that she has been smoking cigarettes.  She has a 30.00 pack-year smoking history. She has never used smokeless tobacco.  ETOH:   reports previous alcohol use. Family History:   Family History   Problem Relation Age of Onset    Other Father     Cancer Maternal Grandmother     Heart Disease Paternal Grandmother     Esophageal Cancer Maternal Aunt     Arthritis Mother            Pt interventions:  Trained in relaxation strategies, Provided education, Established rapport, Conducted functional assessment, Supportive techniques, CBT to target negative thoughts and core beliefs  and Identified maladaptive thoughts        PLAN:   Continue art techniques  CBT techniques    Patient scheduled to return on:   Tuesday 11/30/21    Were changes or additions made to the treatment plan today?   YES []   NO [x]  Noted changes:

## 2021-11-23 NOTE — PROGRESS NOTES
Behavioral Health Consultation  Trever Zepeda, MSN, APRN-CNP, PMHNP-BC  11/23/2021, 10:59 AM      [facility] arrived to appointment, [facility] Waited [facility] minutes, then attempted to contact patient via phone, but unable to leave voicemail as mailbox was full. Unsure as to why patient was rescheduled with writer, when she was referred out to more intensive services back in August 2021.

## 2021-11-30 ENCOUNTER — OFFICE VISIT (OUTPATIENT)
Dept: ORTHOPEDIC SURGERY | Age: 51
End: 2021-11-30
Payer: MEDICARE

## 2021-11-30 ENCOUNTER — HOSPITAL ENCOUNTER (OUTPATIENT)
Age: 51
Discharge: HOME | End: 2021-11-30

## 2021-11-30 ENCOUNTER — HOSPITAL ENCOUNTER (OUTPATIENT)
Dept: PSYCHIATRY | Age: 51
Setting detail: THERAPIES SERIES
Discharge: HOME | End: 2021-11-30
Payer: MEDICARE

## 2021-11-30 DIAGNOSIS — M25.50 ARTHRALGIA, UNSPECIFIED JOINT: Primary | ICD-10-CM

## 2021-11-30 PROCEDURE — G8482 FLU IMMUNIZE ORDER/ADMIN: HCPCS | Performed by: ORTHOPAEDIC SURGERY

## 2021-11-30 PROCEDURE — 3017F COLORECTAL CA SCREEN DOC REV: CPT | Performed by: ORTHOPAEDIC SURGERY

## 2021-11-30 PROCEDURE — G8427 DOCREV CUR MEDS BY ELIG CLIN: HCPCS | Performed by: ORTHOPAEDIC SURGERY

## 2021-11-30 PROCEDURE — 4004F PT TOBACCO SCREEN RCVD TLK: CPT | Performed by: ORTHOPAEDIC SURGERY

## 2021-11-30 PROCEDURE — G8420 CALC BMI NORM PARAMETERS: HCPCS | Performed by: ORTHOPAEDIC SURGERY

## 2021-11-30 PROCEDURE — 90837 PSYTX W PT 60 MINUTES: CPT | Performed by: SOCIAL WORKER

## 2021-11-30 PROCEDURE — 99213 OFFICE O/P EST LOW 20 MIN: CPT | Performed by: STUDENT IN AN ORGANIZED HEALTH CARE EDUCATION/TRAINING PROGRAM

## 2021-11-30 NOTE — PSYCHOTHERAPY
[facility] Therapy Note in [facility] [address], [address]   11/30/2021  1:00 PM  Hieu Gomez  1970  [id number]    Time spent with Patient: 60 minutes      Pt was provided informed consent for the [facility]. Discussed with patient model of service to include the limits of confidentiality (i.e. abuse reporting, suicide intervention, etc.) and short-term intervention focused approach. Pt indicated understanding. S:    Pt presented to therapy and was engaged in discussion. Pt reports that she has been \"going with the flow\" and has been able to work through any difficult situations that have arose. Pt reports she spoke on the phone and met with the woman who was her first girlfriend. Therapist and pt processed the experience and pt reports that it was good, and that there was no hard feelings between them. Pt and therapist continued to process pt's emotions and thoughts and began engaging in an art activity. Pt used art to express how she sees herself while processing different things with therapist.  Therapist and pt discussed friendships vs relationships and what pt is looking for. Therapist challenged pt who reports she is looking for people with certain attributes but doesn't want a lot of expectations in a friendship. Pt reports she understands that there can be expectations of her in friendships and processed what those are. Pt and therapist discussed how pt struggles with boundaries as she typically \"takes in strays. \"  Pt does not believe she has the capacity to handle someone who needs a lot from her and is looking for friendships who can also give rather than just take. Pt reports a big barrier to being social is transportation but she is working on it. Pt and therapist discussed the connection of low self-esteem to struggling with boundaries. Pt repeated what her F often said to her. Next week pt and therapist will explore how to set up and maintain boundaries. O:  MSE:     Appearance    alert, cooperative  Appetite normal  Sleep disturbance No  Fatigue No  Loss of pleasure Yes  Impulsive behavior No  Speech    normal rate, normal volume and well articulated  Mood    Depressed  Low self-esteem  Affect    depressed affect  Thought Content    worthlessness and intrusive thoughts  Thought Process    linear, goal directed and coherent  Associations    logical connections  Insight    Good  Judgment    Intact  Orientation    oriented to person, place, time, and general circumstances  Memory    recent and remote memory intact  Attention/Concentration    intact  Morbid ideation No  Suicide Assessment    no suicidal ideation    A:  Pt presented to therapy as low and depressed but was engaged in discussion and processing. Pt is exhibiting low self-esteem and self-worth which is impacting her ability to be social and maintain appropriate boundaries. Pt is worried about taking on unhealthy friendships because of her past tendencies to take on other people's needs and not pay attention to her own. Pt struggles with her childhood and past trauma and would benefit from processing that trauma. Pt would also benefit from some positive psychology and learning about healthy boundaries.           Visit Diagnosis:   PTSD      History from Medical Record:        Diagnosis Date    Acute respiratory failure with hypoxia ([facility] 10/16/2020    Alcohol withdrawal syndrome, with delirium ([address] 12/14/2019    Alcoholism ([address]     Anemia 10/2020    GI bleed    Anxiety     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24, the patient underwent 2 surgical resections without known recurrence 10/23/2020    at age 24    Closed fracture of lateral portion of left tibial plateau 27/09/9442    COPD (chronic obstructive pulmonary disease) ([address]     CO2 retainer, on Bipap at night for this, Dr. Marin Moreno ( last visit 11/20/2020 and note on chart )    Depression     bipolar, major depressive disorder, ptsd, anxiety    Dysphagia     GI bleed 10/2020    Hypertension     Memory loss     Oxygen dependent     USES AT NIGHT - 3L/MIN    Pain, joint, ankle and foot     Pancreatic lesion 10/2020    Dr. Estefani Espinal working up pt    Peripheral neuropathy     Seizures ([facility]     LAST SEIZURE 3/2020 - FEELS IT WAS FROM ALCOHOL WITHDRAWL    Tension headache     Under care of team 09/29/2021    neuro-Dr Coyle-[facility]-last visit sep 2021    Under care of team 09/29/2021    pain management-Hamzah Martines-[facility] jim[facility]-last visit sep 2021    Under care of team 09/29/2021    pulmonology-Dr Dueñas-[facility]-due for visit oct 26/2021    Under care of team 09/29/2021    psych-bahnfeldt NP-telemed-last visit 10/ 2021    Under care of team 09/29/2021    pv-[address]-last visit aug 2021    Under care of team     NEPHROLOGY - DR. CHAPMAN  Wears glasses     Wears partial dentures     UPPER    Wellness examination 09/29/2021    pcp-Ludivina Grimm-[facility] cornelio-last visit june 2021     Medications:   Current Outpatient Medications   Medication Sig Dispense Refill    traZODone (DESYREL) 50 MG tablet TAKE ONE TABLET BY MOUTH ONCE NIGHTLY AS NEEDED FOR SLEEP 30 tablet 0    busPIRone (BUSPAR) 30 MG tablet TAKE ONE TABLET BY MOUTH TWICE A DAY WITH MEALS 60 tablet 0    polyethylene glycol (GOLYTELY) 236 g solution Follow instructions provided by physicians office 4000 mL 0    bisacodyl (BISACODYL) 5 MG EC tablet Take 4 tabs at 10am the day prior to Colonoscopy 4 tablet 0    polyethylene glycol (GLYCOLAX) 17 GM/SCOOP powder Use as directed following your patient instructions given by office 51 g 0    fluticasone-umeclidin-vilant (TRELEGY ELLIPTA) 100-62.5-25 MCG/INH AEPB Inhale 1 puff into the lungs daily 1 each 5    predniSONE (DELTASONE) 10 MG tablet 4 tabs by mouth daily for 3 days, then 3 tabs daily for 3 days, then 2 tabs daily for 3 days, then 1 tab daily till gone.  30 tablet 0    denosumab (PROLIA) 60 MG/ML SOSY SC injection Inject 1 mL into the skin every 6 months 1 mL 1    ondansetron (ZOFRAN ODT) 4 MG disintegrating tablet Take 1 tablet by mouth every 8 hours as needed for Nausea or Vomiting 10 tablet 0    sodium chloride 1 g tablet Take 1 tablet by mouth 4 times daily 120 tablet 3    CREON 33546-88120 units delayed release capsule TAKE ONE CAPSULE BY MOUTH THREE TIMES A DAY WITH MEALS 90 capsule 1    pregabalin (LYRICA) 200 MG capsule Take 1 capsule by mouth 3 times daily for 180 days.  90 capsule 5    carBAMazepine (TEGRETOL) 200 MG tablet TAKE ONE TABLET BY MOUTH THREE TIMES A DAY 90 tablet 0    topiramate (TOPAMAX) 50 MG tablet TAKE ONE TABLET BY MOUTH TWICE A  tablet 1    spironolactone (ALDACTONE) 50 MG tablet TAKE ONE TABLET BY MOUTH DAILY 90 tablet 1    amLODIPine (NORVASC) 5 MG tablet TAKE ONE TABLET BY MOUTH DAILY 90 tablet 1    escitalopram (LEXAPRO) 5 MG tablet TAKE ONE TABLET BY MOUTH DAILY 30 tablet 1    DULoxetine (CYMBALTA) 60 MG extended release capsule Take 60 mg by mouth daily       simethicone (MYLICON) 80 MG chewable tablet Take 1 tablet by mouth 4 times daily as needed for Flatulence 180 tablet 3    hydrOXYzine (ATARAX) 25 MG tablet Take 1 tablet by mouth 3 times daily as needed for Anxiety (Patient taking differently: Take 25 mg by mouth 3 times daily ) 90 tablet 3    metoclopramide (REGLAN) 5 MG tablet Take 1 tablet by mouth 3 times daily 120 tablet 2    omeprazole (PRILOSEC) 40 MG delayed release capsule Take 1 capsule by mouth 2 times daily 60 capsule 2    rOPINIRole (REQUIP) 1 MG tablet Take 1 tablet by mouth nightly 90 tablet 1    budesonide-formoterol (SYMBICORT) 160-4.5 MCG/ACT AERO Inhale 2 puffs into the lungs 2 times daily 3 Inhaler 1    tiotropium (SPIRIVA RESPIMAT) 2.5 MCG/ACT AERS inhaler Inhale 2 puffs into the lungs daily 1 Inhaler 11    albuterol sulfate HFA (VENTOLIN HFA) 108 (90 Base) MCG/ACT inhaler Inhale 2 puffs into the lungs 4 times daily as needed for Wheezing 1 Inhaler 5    vitamin D (ERGOCALCIFEROL) 07769 units CAPS capsule Take 1 capsule by mouth once a week 12 capsule 1    folic acid (FOLVITE) 1 MG tablet Take 1 tablet by mouth daily 90 tablet 1     No current facility-administered medications for this encounter. Social History:   Social History     Socioeconomic History    Marital status: Single     Spouse name: Not on file    Number of children: Not on file    Years of education: Not on file    Highest education level: Not on file   Occupational History    Not on file   Tobacco Use    Smoking status: Current Every Day Smoker     Packs/day: 1.00     Years: 30.00     Pack years: 30.00     Types: Cigarettes    Smokeless tobacco: Never Used   Vaping Use    Vaping Use: Never used   Substance and Sexual Activity    Alcohol use: Not Currently     Alcohol/week: 0.0 standard drinks    Drug use: Not Currently     Frequency: 2.0 times per week     Comment: 6 months ago    Sexual activity: Not Currently   Other Topics Concern    Not on file   Social History Narrative    Not on file     Social Determinants of Health     Financial Resource Strain: Medium Risk    Difficulty of Paying Living Expenses: Somewhat hard   Food Insecurity: No Food Insecurity    Worried About Running Out of Food in the Last Year: Never true    Tyler of Food in the Last Year: Never true   Transportation Needs:     Lack of Transportation (Medical): Not on file    Lack of Transportation (Non-Medical):  Not on file   Physical Activity:     Days of Exercise per Week: Not on file    Minutes of Exercise per Session: Not on file   Stress:     Feeling of Stress : Not on file   Social Connections:     Frequency of Communication with Friends and Family: Not on file    Frequency of Social Gatherings with Friends and Family: Not on file    Attends Scientologist Services: Not on file    Active Member of Clubs or Organizations: Not on file    Attends [employer] or Organization Meetings: Not on file    Marital Status: Not on file   Intimate Partner Violence:     Fear of Current or Ex-Partner: Not on file    Emotionally Abused: Not on file    Physically Abused: Not on file    Sexually Abused: Not on file   Housing Stability:     Unable to Pay for Housing in the Last Year: Not on file    Number of Jarret in the Last Year: Not on file    Unstable Housing in the Last Year: Not on file       TOBACCO:   reports that she has been smoking cigarettes. She has a 30.00 pack-year smoking history. She has never used smokeless tobacco.  ETOH:   reports previous alcohol use. Family History:   Family History   Problem Relation Age of Onset    Other Father     Cancer Maternal Grandmother     Heart Disease Paternal Grandmother     Esophageal Cancer Maternal Aunt     Arthritis Mother            Pt interventions:  Trained in relaxation strategies, Provided education, Motivational Interviewing to increase patient confidence and compliance with adhering to behavioral change plan, Motivational Interviewing to determine importance and readiness for change, Established rapport, Supportive techniques, Provided Psychoeducation re: negative thoughts  and Identified maladaptive thoughts        PLAN:   Learning healthy boundaries  Maintaining healthy boundaries       Patient scheduled to return on:       Were changes or additions made to the treatment plan today?   YES []   NO []  Noted changes:

## 2021-11-30 NOTE — PROGRESS NOTES
[facility]  [facility]  [address] [place] [address]  Dept: [phone]  Dept Fax: [phone]        [facility] Follow Up      Subjective:   Date of Surgery:7/3/2013    Sloan Patterson is a 45y.o. year old female who presents to the clinic today for routine follow up of her left tibial plateau hardware irritation. Patient was last seen on 11/9/2021. At her previous appointment options were discussed for hardware removal however patient elected not to pursue hardware removal.  At today's appointment patient reports pain in all of her joints. She notes that the hardware is not irritating her as much anymore. She denies any new falls or injuries she denies any numbness or tingling. Review of Systems  Gen: no fever, chills, malaise  CV: no chest pain or palpitations  Resp: no cough or shortness of breath  GI: no nausea, vomiting, diarrhea, or constipation  Neuro: no seizures, vertigo, or headache  Msk: Left tibial hardware irritation. 10 remaining systems reviewed and negative    Objective : There were no vitals filed for this visit. There is no height or weight on file to calculate BMI. General: No acute distress, resting comfortably in the clinic  Neuro: alert. oriented  Eyes: Extra-ocular muscles intact  Pulm: Respirations unlabored and regular. Skin: warm, well perfused  Psych:   Patient has good fund of knowledge and displays understanding of exam, diagnosis, and plan. MSK: Left lower extremity: Palpable screw over the anterior medial aspect of the tibia. Incision healed well no erythema discharge or drainage. Tender to palpation over the Achilles tendon insertion. Compartments soft. TA/EHL/FHL/GS motor intact. Deep and Superficial Peroneal/Saphenous/Sural/Plantar SILT.   Mild dysesthesias over the dorsum of the foot      Radiology:  No new radiographs taken at today's visit     Assessment:   45y.o. year old female with left tibial hardware irritation status post tibial plateau ORIF  Plan:   -Discussed treatment options for hardware removal with single screw removal versus entire plate removal.  At this time patient states that the hardware is not bothering her enough to undergo surgical intervention.    -She is concerned with pain in all of her joints which has been happening for a long time. Patient was given a referral for rheumatology for further work-up    -Patient can follow-up as needed if symptoms worsen or fail to improve and she wishes to discuss surgical intervention. All questions were addressed and patient was in agreement with this plan       Orders Placed This Encounter   Procedures   Tello Malloy MD, Rheumatology, [facility]     Referral Priority:   Routine     Referral Type:   Eval and Treat     Referral Reason:   Specialty Services Required     Referred to Provider:   Juliane Alfonso MD     Requested Specialty:   Rheumatology     Number of Visits Requested:   1       Electronically signed by Judd Rebollar DO on 11/30/2021 at 2:50 PM    This note is created with the assistance of a speech recognition program.  While intending to generate a document that actually reflects the content of the visit, the document can still have some errors including those of syntax and sound a like substitutions which may escape proof reading.   In such instances, actual meaning can be extrapolated by contextual diversion

## 2021-12-02 ENCOUNTER — HOSPITAL ENCOUNTER (OUTPATIENT)
Age: 51
Discharge: HOME | End: 2021-12-02
Payer: MEDICARE

## 2021-12-02 LAB
ANION GAP SERPL CALCULATED.3IONS-SCNC: 12 MMOL/L (ref 9–17)
BUN BLDV-MCNC: 4 MG/DL (ref 6–20)
BUN/CREAT BLD: ABNORMAL (ref 9–20)
CALCIUM SERPL-MCNC: 9.1 MG/DL (ref 8.6–10.4)
CHLORIDE BLD-SCNC: 94 MMOL/L (ref 98–107)
CO2: 24 MMOL/L (ref 20–31)
CREAT SERPL-MCNC: 0.5 MG/DL (ref 0.5–0.9)
GFR AFRICAN AMERICAN: >60 ML/MIN
GFR NON-AFRICAN AMERICAN: >60 ML/MIN
GFR SERPL CREATININE-BSD FRML MDRD: ABNORMAL ML/MIN/{1.73_M2}
GFR SERPL CREATININE-BSD FRML MDRD: ABNORMAL ML/MIN/{1.73_M2}
GLUCOSE BLD-MCNC: 107 MG/DL (ref 70–99)
POTASSIUM SERPL-SCNC: 4.6 MMOL/L (ref 3.7–5.3)
SODIUM BLD-SCNC: 130 MMOL/L (ref 135–144)

## 2021-12-02 PROCEDURE — 36415 COLL VENOUS BLD VENIPUNCTURE: CPT

## 2021-12-02 PROCEDURE — 80048 BASIC METABOLIC PNL TOTAL CA: CPT

## 2021-12-08 ENCOUNTER — TELEPHONE (OUTPATIENT)
Dept: GASTROENTEROLOGY | Age: 51
End: 2021-12-08

## 2021-12-08 NOTE — TELEPHONE ENCOUNTER
Pt called in stating that she had a colonoscopy with Dr. Estefani Espinal on 11/19/2021. Pt states since she has had the procedure her body just seems all out of sync and she is now experiencing belching, vomiting, nausea, and is getting migraines. Pt states she has not been able to keep food down and has only been able to have chicken broth and saltines. Pt requests call back.

## 2021-12-10 ENCOUNTER — HOSPITAL ENCOUNTER (OUTPATIENT)
Age: 51
Discharge: HOME | End: 2021-12-10
Payer: MEDICARE

## 2021-12-10 DIAGNOSIS — N39.41 URGE INCONTINENCE OF URINE: ICD-10-CM

## 2021-12-10 DIAGNOSIS — E87.1 HYPONATREMIA: ICD-10-CM

## 2021-12-10 PROCEDURE — 80048 BASIC METABOLIC PNL TOTAL CA: CPT

## 2021-12-10 PROCEDURE — 36415 COLL VENOUS BLD VENIPUNCTURE: CPT

## 2021-12-10 NOTE — TELEPHONE ENCOUNTER
Returned call. No answer. LVM informing patient I was following up on her reported symptoms - also offered appointment today at [facility] if she is able to come. Advised patient to call back to discuss.

## 2021-12-11 LAB
ANION GAP SERPL CALCULATED.3IONS-SCNC: 14 MMOL/L (ref 9–17)
BUN BLDV-MCNC: 7 MG/DL (ref 6–20)
BUN/CREAT BLD: ABNORMAL (ref 9–20)
CALCIUM SERPL-MCNC: 9.2 MG/DL (ref 8.6–10.4)
CHLORIDE BLD-SCNC: 93 MMOL/L (ref 98–107)
CO2: 24 MMOL/L (ref 20–31)
CREAT SERPL-MCNC: 0.59 MG/DL (ref 0.5–0.9)
GFR AFRICAN AMERICAN: >60 ML/MIN
GFR NON-AFRICAN AMERICAN: >60 ML/MIN
GFR SERPL CREATININE-BSD FRML MDRD: ABNORMAL ML/MIN/{1.73_M2}
GFR SERPL CREATININE-BSD FRML MDRD: ABNORMAL ML/MIN/{1.73_M2}
GLUCOSE BLD-MCNC: 82 MG/DL (ref 70–99)
POTASSIUM SERPL-SCNC: 4.8 MMOL/L (ref 3.7–5.3)
SODIUM BLD-SCNC: 131 MMOL/L (ref 135–144)

## 2021-12-13 DIAGNOSIS — R26.2 AMBULATORY DYSFUNCTION: Primary | ICD-10-CM

## 2021-12-13 DIAGNOSIS — J44.9 CHRONIC OBSTRUCTIVE PULMONARY DISEASE, UNSPECIFIED COPD TYPE (HCC): ICD-10-CM

## 2021-12-13 DIAGNOSIS — G62.1 ALCOHOLIC PERIPHERAL NEUROPATHY (HCC): ICD-10-CM

## 2021-12-13 RX ORDER — CARBAMAZEPINE 200 MG/1
TABLET ORAL
Qty: 90 TABLET | Refills: 0 | Status: SHIPPED | OUTPATIENT
Start: 2021-12-13 | End: 2022-01-10

## 2021-12-13 RX ORDER — ESCITALOPRAM OXALATE 5 MG/1
TABLET ORAL
Qty: 30 TABLET | Refills: 1 | Status: SHIPPED | OUTPATIENT
Start: 2021-12-13 | End: 2021-12-14

## 2021-12-13 RX ORDER — BUSPIRONE HYDROCHLORIDE 30 MG/1
TABLET ORAL
Qty: 60 TABLET | Refills: 0 | Status: SHIPPED | OUTPATIENT
Start: 2021-12-13 | End: 2021-12-14

## 2021-12-13 RX ORDER — TRAZODONE HYDROCHLORIDE 50 MG/1
TABLET ORAL
Qty: 30 TABLET | Refills: 0 | Status: SHIPPED | OUTPATIENT
Start: 2021-12-13 | End: 2021-12-14 | Stop reason: SDUPTHER

## 2021-12-13 RX ORDER — DULOXETIN HYDROCHLORIDE 60 MG/1
CAPSULE, DELAYED RELEASE ORAL
Qty: 30 CAPSULE | Refills: 3 | Status: SHIPPED | OUTPATIENT
Start: 2021-12-13 | End: 2021-12-14

## 2021-12-13 NOTE — TELEPHONE ENCOUNTER
Last visit: 11/1/21  Last Med refill: 10/6/21  Does patient have enough medication for 72 hours: No:     Next Visit Date:  Future Appointments   Date Time Provider Lexi Anna   12/16/2021  3:00 PM Solis Salazar MD Neuro Spec [facility]   1/4/2022 11:15 AM Ludivina Bach MD [facility]   1/13/2022  3:50 PM Alexandra Inman MD AFL Neph [place] None   1/21/2022  1:30 PM Kye Lam MD [address] Maintenance   Topic Date Due    Breast cancer screen  11/06/2021    Shingles Vaccine (2 of 2) 12/30/2021    Low dose CT lung screening  05/24/2022    Potassium monitoring  12/10/2022    Creatinine monitoring  12/10/2022    Lipid screen  12/23/2025    DTaP/Tdap/Td vaccine (2 - Td or Tdap) 12/28/2030    Colon cancer screen colonoscopy  11/19/2031    Pneumococcal 0-64 years Vaccine (2 of 2 - PPSV23) 07/05/2034    Flu vaccine  Completed    COVID-19 Vaccine  Completed    Hepatitis C screen  Completed    HIV screen  Completed    Hepatitis A vaccine  Aged Out    Hepatitis B vaccine  Aged Out    Hib vaccine  Aged Out    Meningococcal (ACWY) vaccine  Aged Out       Hemoglobin A1C (%)   Date Value   04/13/2021 5.5   07/14/2019 4.3             ( goal A1C is < 7)   No results found for: LABMICR  LDL Cholesterol (mg/dL)   Date Value   12/23/2020 134 (H)   06/18/2019 92       (goal LDL is <100)   AST (U/L)   Date Value   10/12/2021 14     ALT (U/L)   Date Value   10/12/2021 7     BUN (mg/dL)   Date Value   12/10/2021 7     BP Readings from Last 3 Encounters:   11/19/21 133/89   11/19/21 (!) 153/108   11/05/21 118/86          (goal 120/80)    All Future Testing planned in [facility]  Lab Frequency Next Occurrence   Cancer Antigen 19-9 Once 02/12/2021   EGD Once 03/30/2021   SLP videofluoroscopic swallow study Once 07/31/2021   COVID-19 Once 05/19/2021   CT ABDOMEN PELVIS W WO CONTRAST Additional Contrast? Radiologist Recommendation Once 09/10/2021   DEXA VERTEBRAL FRACTURE ASSESSMENT Once 08/31/2021   FACET JOINT L/S SINGLE Once 09/16/2021   EUS Once 09/29/2021   ERCP Once 09/29/2021   Sodium Once 12/04/2021   TAMMIE Digital Screen Bilateral [[id number]] Once 12/01/2021   COLONOSCOPY (Diagnostic) Once 31/20/8224   Basic Metabolic Panel Once a week 12/17/2021, 12/24/2021               Patient Active Problem List:     Acute bronchitis     Reflex sympathetic dystrophy of lower limb     Essential hypertension     Nicotine addiction     Psychophysiological insomnia     Anxiety     Alcoholic peripheral neuropathy (HCC)     Hypokalemia     Macrocytic anemia     Hypomagnesemia     Tobacco use     Ambulatory dysfunction     Seizure disorder ([address]     COPD with acute exacerbation (HCC)     Paresthesia of lower extremity     Acute respiratory failure with hypoxia (HCC)     Pancreatic cyst     Recurrent major depressive disorder (HCC)     Elevated CA 19-9 level     Perineal mass, female     Esophagitis candidal      Condyloma     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24, the patient underwent 2 surgical resections without known recurrence     Alcohol use disorder, severe, in early remission ([address]     Metabolic encephalopathy     AMAN (generalized anxiety disorder)     Major depressive disorder, recurrent severe without psychotic features ([address]     Esophageal dysphagia     Chronic peripheral neuropathic pain     History of alcohol abuse     History of petit-mal seizures     Intractable episodic tension-type headache     Personal history of astrocytoma     Multilevel spine pain     Chronic pain syndrome     Marijuana abuse     Severe sepsis (HCC)     Closed fracture of fifth thoracic vertebra (HCC)     Diverticulitis of large intestine with abscess without bleeding     Elevated brain natriuretic peptide (BNP) level     Elevated alkaline phosphatase level     Chronic pancreatitis (HCC)     Erythema ab igne     Compression fracture of thoracic vertebra (HCC)     Pathological compression fracture of lumbar vertebra with delayed healing     Metabolic acidosis with increased anion gap and accumulation of organic acids     Community acquired pneumonia of left lower lobe of lung     Septicemia ([address]     Alcohol-induced acute pancreatitis     Fluid collection of pancreas     Diverticulitis of large intestine without perforation or abscess with bleeding     Pseudocyst of pancreas     Bandemia     Sepsis ([address]     Acute recurrent pancreatitis     Atelectasis of left lung     Hyponatremia     Iron deficiency anemia     Adenomatous polyp of sigmoid colon

## 2021-12-13 NOTE — TELEPHONE ENCOUNTER
Pharmacy requesting a refill of duloxetine 60mg  Date of last fill: 9/7/21  Surgery:11/19/21  Time elapsed since last surgery:3 weeks  Date of next follow up appointment:12/16/21  Pt notified response time for refills is 24-48 hours

## 2021-12-14 ENCOUNTER — TELEMEDICINE (OUTPATIENT)
Dept: PSYCHIATRY | Age: 51
End: 2021-12-14
Payer: MEDICARE

## 2021-12-14 DIAGNOSIS — F10.21 ALCOHOL USE DISORDER, SEVERE, IN EARLY REMISSION (HCC): ICD-10-CM

## 2021-12-14 DIAGNOSIS — F33.1 MODERATE EPISODE OF RECURRENT MAJOR DEPRESSIVE DISORDER (HCC): ICD-10-CM

## 2021-12-14 DIAGNOSIS — G60.9 IDIOPATHIC PERIPHERAL NEUROPATHY: Primary | ICD-10-CM

## 2021-12-14 DIAGNOSIS — F41.1 GAD (GENERALIZED ANXIETY DISORDER): Primary | ICD-10-CM

## 2021-12-14 PROCEDURE — 99214 OFFICE O/P EST MOD 30 MIN: CPT | Performed by: NURSE PRACTITIONER

## 2021-12-14 RX ORDER — HYDROXYZINE HYDROCHLORIDE 25 MG/1
25 TABLET, FILM COATED ORAL 3 TIMES DAILY PRN
Qty: 90 TABLET | Refills: 3 | Status: SHIPPED
Start: 2021-12-14 | End: 2022-01-11

## 2021-12-14 RX ORDER — TRAZODONE HYDROCHLORIDE 50 MG/1
TABLET ORAL
Qty: 30 TABLET | Refills: 0 | Status: SHIPPED | OUTPATIENT
Start: 2021-12-14 | End: 2022-01-21

## 2021-12-14 RX ORDER — ESCITALOPRAM OXALATE 10 MG/1
10 TABLET ORAL DAILY
Qty: 30 TABLET | Refills: 1 | Status: SHIPPED | OUTPATIENT
Start: 2021-12-14 | End: 2022-01-11

## 2021-12-14 RX ORDER — DULOXETINE 40 MG/1
40 CAPSULE, DELAYED RELEASE ORAL DAILY
Qty: 30 CAPSULE | Refills: 0 | Status: SHIPPED | OUTPATIENT
Start: 2021-12-14 | End: 2022-01-10

## 2021-12-14 RX ORDER — BUSPIRONE HYDROCHLORIDE 15 MG/1
15 TABLET ORAL 3 TIMES DAILY
Qty: 90 TABLET | Refills: 1 | Status: SHIPPED | OUTPATIENT
Start: 2021-12-14 | End: 2022-01-11 | Stop reason: SDUPTHER
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
## 2021-12-16 ENCOUNTER — OFFICE VISIT (OUTPATIENT)
Dept: NEUROLOGY | Age: 51
End: 2021-12-16
Payer: MEDICARE

## 2021-12-16 ENCOUNTER — HOSPITAL ENCOUNTER (OUTPATIENT)
Age: 51
Discharge: HOME | End: 2021-12-16
Payer: MEDICARE

## 2021-12-16 ENCOUNTER — TELEPHONE (OUTPATIENT)
Dept: NEUROSURGERY | Age: 51
End: 2021-12-16

## 2021-12-16 VITALS
SYSTOLIC BLOOD PRESSURE: 131 MMHG | HEART RATE: 83 BPM | DIASTOLIC BLOOD PRESSURE: 89 MMHG | WEIGHT: 139.7 LBS | BODY MASS INDEX: 23.28 KG/M2 | HEIGHT: 65 IN

## 2021-12-16 DIAGNOSIS — F10.11 HISTORY OF ALCOHOL ABUSE: ICD-10-CM

## 2021-12-16 DIAGNOSIS — G43.019 INTRACTABLE MIGRAINE WITHOUT AURA AND WITHOUT STATUS MIGRAINOSUS: Primary | ICD-10-CM

## 2021-12-16 DIAGNOSIS — H55.00 NYSTAGMUS: ICD-10-CM

## 2021-12-16 DIAGNOSIS — G62.9 PERIPHERAL POLYNEUROPATHY: ICD-10-CM

## 2021-12-16 DIAGNOSIS — G40.909 SEIZURE DISORDER (HCC): ICD-10-CM

## 2021-12-16 LAB
ANION GAP SERPL CALCULATED.3IONS-SCNC: 12 MMOL/L (ref 9–17)
BUN BLDV-MCNC: 6 MG/DL (ref 6–20)
BUN/CREAT BLD: ABNORMAL (ref 9–20)
CALCIUM SERPL-MCNC: 9.6 MG/DL (ref 8.6–10.4)
CHLORIDE BLD-SCNC: 91 MMOL/L (ref 98–107)
CO2: 24 MMOL/L (ref 20–31)
CREAT SERPL-MCNC: 0.52 MG/DL (ref 0.5–0.9)
GFR AFRICAN AMERICAN: >60 ML/MIN
GFR NON-AFRICAN AMERICAN: >60 ML/MIN
GFR SERPL CREATININE-BSD FRML MDRD: ABNORMAL ML/MIN/{1.73_M2}
GFR SERPL CREATININE-BSD FRML MDRD: ABNORMAL ML/MIN/{1.73_M2}
GLUCOSE BLD-MCNC: 72 MG/DL (ref 70–99)
POTASSIUM SERPL-SCNC: 4.8 MMOL/L (ref 3.7–5.3)
SODIUM BLD-SCNC: 127 MMOL/L (ref 135–144)

## 2021-12-16 PROCEDURE — 36415 COLL VENOUS BLD VENIPUNCTURE: CPT

## 2021-12-16 PROCEDURE — G8427 DOCREV CUR MEDS BY ELIG CLIN: HCPCS | Performed by: PSYCHIATRY & NEUROLOGY

## 2021-12-16 PROCEDURE — 80048 BASIC METABOLIC PNL TOTAL CA: CPT

## 2021-12-16 PROCEDURE — 99214 OFFICE O/P EST MOD 30 MIN: CPT | Performed by: PSYCHIATRY & NEUROLOGY

## 2021-12-16 PROCEDURE — 4004F PT TOBACCO SCREEN RCVD TLK: CPT | Performed by: PSYCHIATRY & NEUROLOGY

## 2021-12-16 PROCEDURE — G8482 FLU IMMUNIZE ORDER/ADMIN: HCPCS | Performed by: PSYCHIATRY & NEUROLOGY

## 2021-12-16 PROCEDURE — 3017F COLORECTAL CA SCREEN DOC REV: CPT | Performed by: PSYCHIATRY & NEUROLOGY

## 2021-12-16 PROCEDURE — G8420 CALC BMI NORM PARAMETERS: HCPCS | Performed by: PSYCHIATRY & NEUROLOGY

## 2021-12-16 RX ORDER — METHYLPREDNISOLONE 4 MG/1
TABLET ORAL
Qty: 21 TABLET | Refills: 0 | Status: SHIPPED | OUTPATIENT
Start: 2021-12-16 | End: 2021-12-22

## 2021-12-16 NOTE — PROGRESS NOTES
All items selected indicate a positive finding. Those items not selected are negative.   Constitutional [] Weight loss/gain   [x] Fatigue  [] Fever/Chills   HEENT [] Hearing Loss  [x] Visual Disturbance  [] Tinnitus  [] Eye pain   Respiratory [x] Shortness of Breath  [x] Cough  [] Snoring   Cardiovascular [] Chest Pain  [] Palpitations  [] Lightheaded   GI [x] Constipation  [x] Diarrhea  [] Swallowing change  [] Nausea/vomiting    [x] Urinary Frequency  [x] Urinary Urgency   Musculoskeletal [x] Neck pain  [x] Back pain  [x] Muscle pain  [] Restless legs   Dermatologic [] Skin changes   Neurologic [x] Memory loss/confusion  [] Seizures  [x] Trouble walking or imbalance  [x] Dizziness  [] Sleep disturbance  [x] Weakness  [x] Numbness  [] Tremors  [] Speech Difficulty  [x] Headaches  [] Light Sensitivity  [] Sound Sensitivity   Endocrinology []Excessive thirst  []Excessive hunger   Psychiatric [x] Anxiety/Depression  [] Hallucination   Allergy/immunology []Hives/environmental allergies   Hematologic/lymph [] Abnormal bleeding  [] Abnormal bruising

## 2021-12-16 NOTE — TELEPHONE ENCOUNTER
Left message to inform patient to have functional capacity test completed before paper work can be completed by [facility]

## 2021-12-16 NOTE — PROGRESS NOTES
NEUROLOGY FOLLOW-UP  Patient Name:  Denise Lynch  :   1970  Clinic Visit Date: 2021  LOV: 21      Dear Dr. Neeta Ernst MD     I saw Ms. Denise Lynch in follow-up in the office today in continuation of neurologic care. As you know, she  is a 45 y.o. female with hx of HTN, COPD, astrocytoma resection x2 (), seizure disorder, migraine headaches and recent hosp on  for back pain and lethargy found to have toxic metabolic encephalopathy in the setting of diverticulitis, abdominal abscess, sepsis, pleural effusion. She was treated with Zosyn. She was continued on Tegretol 200 mg tid for seizure prophylaxis. She also had hx of chronic L4-L5 radiculopathy with osteoporosis with MRI lumbar spine demonstrating compression deformity of L5 superior endplate. She has been on baclofen 10 mg 3 times daily. She also has been on Lyrica 200 mg tid for peripheral polyneuropathy. Also on Topamax 50 mg twice daily for migraine headaches. Clinic stating that she has been having continuous and persistent migrainous headache for the past few days. Also admits to having photophobia and phonophobia with it. She also stated that she is having nystagmus and was evaluated by ophthalmologist and it was attributed to alcohol abuse. She also has chronic alcoholism with chronic pancreatitis and has been sober since . History is also significant for astrocytoma resection x2 (); affective disorder with PTSD, depression/anxiety and has been on BuSpar. All items selected indicate a positive finding. Those items not selected are negative.   Constitutional [] Weight loss/gain   [] Fatigue  [] Fever/Chills   HEENT [] Hearing Loss  [] Visual Disturbance  [] Tinnitus  [] Eye pain   Respiratory [] Shortness of Breath  [] Cough  [] Snoring   Cardiovascular [] Chest Pain  [] Palpitations  [] Lightheaded   GI [] Constipation  [] Diarrhea  [] Swallowing change    [] Urinary Frequency  [] Urinary Urgency   Musculoskeletal [] Neck pain  [x] Back pain  [] Muscle pain  [] Restless legs   Dermatologic [] Skin changes   Neurologic [] Memory loss/confusion  [] Seizures  [] Trouble walking or imbalance  [] Dizziness  [] Weakness  [] Numbness  [] Tremors  [x] sleep disturbance   [x] Headaches on awakening   [] Light Sensitivity  [] Sound Sensitivity   Endocrinology []Excessive thirst  []Excessive hunger   Psychiatric [x] Anxiety/Depression; denied suicidal ideations  [] Hallucination   Allergy/immunology []Hives/environmental allergies   Hematologic/lymph [] Abnormal bleeding  [] Abnormal bruising       Current Outpatient Medications on File Prior to Visit   Medication Sig Dispense Refill    DULoxetine 40 MG CPEP Take 40 mg by mouth daily 30 capsule 0    hydrOXYzine (ATARAX) 25 MG tablet Take 1 tablet by mouth 3 times daily as needed for Anxiety 90 tablet 3    busPIRone (BUSPAR) 15 MG tablet Take 15 mg by mouth three times daily 90 tablet 1    escitalopram (LEXAPRO) 10 MG tablet Take 1 tablet by mouth daily 30 tablet 1    traZODone (DESYREL) 50 MG tablet TAKE ONE TABLET BY MOUTH ONCE NIGHTLY AS NEEDED FOR SLEEP 30 tablet 0    carBAMazepine (TEGRETOL) 200 MG tablet TAKE ONE TABLET BY MOUTH THREE TIMES A DAY 90 tablet 0    polyethylene glycol (GOLYTELY) 236 g solution Follow instructions provided by physicians office 4000 mL 0    bisacodyl (BISACODYL) 5 MG EC tablet Take 4 tabs at 10am the day prior to Colonoscopy 4 tablet 0    polyethylene glycol (GLYCOLAX) 17 GM/SCOOP powder Use as directed following your patient instructions given by office 51 g 0    fluticasone-umeclidin-vilant (TRELEGY ELLIPTA) 100-62.5-25 MCG/INH AEPB Inhale 1 puff into the lungs daily 1 each 5    predniSONE (DELTASONE) 10 MG tablet 4 tabs by mouth daily for 3 days, then 3 tabs daily for 3 days, then 2 tabs daily for 3 days, then 1 tab daily till gone.  30 tablet 0    denosumab (PROLIA) 60 MG/ML SOSY SC injection Inject 1 mL into the skin every 6 months 1 mL 1    ondansetron (ZOFRAN ODT) 4 MG disintegrating tablet Take 1 tablet by mouth every 8 hours as needed for Nausea or Vomiting 10 tablet 0    sodium chloride 1 g tablet Take 1 tablet by mouth 4 times daily 120 tablet 3    CREON 08469-43248 units delayed release capsule TAKE ONE CAPSULE BY MOUTH THREE TIMES A DAY WITH MEALS 90 capsule 1    pregabalin (LYRICA) 200 MG capsule Take 1 capsule by mouth 3 times daily for 180 days. 90 capsule 5    topiramate (TOPAMAX) 50 MG tablet TAKE ONE TABLET BY MOUTH TWICE A  tablet 1    spironolactone (ALDACTONE) 50 MG tablet TAKE ONE TABLET BY MOUTH DAILY 90 tablet 1    amLODIPine (NORVASC) 5 MG tablet TAKE ONE TABLET BY MOUTH DAILY 90 tablet 1    simethicone (MYLICON) 80 MG chewable tablet Take 1 tablet by mouth 4 times daily as needed for Flatulence 180 tablet 3    metoclopramide (REGLAN) 5 MG tablet Take 1 tablet by mouth 3 times daily 120 tablet 2    omeprazole (PRILOSEC) 40 MG delayed release capsule Take 1 capsule by mouth 2 times daily 60 capsule 2    rOPINIRole (REQUIP) 1 MG tablet Take 1 tablet by mouth nightly 90 tablet 1    budesonide-formoterol (SYMBICORT) 160-4.5 MCG/ACT AERO Inhale 2 puffs into the lungs 2 times daily 3 Inhaler 1    tiotropium (SPIRIVA RESPIMAT) 2.5 MCG/ACT AERS inhaler Inhale 2 puffs into the lungs daily 1 Inhaler 11    albuterol sulfate HFA (VENTOLIN HFA) 108 (90 Base) MCG/ACT inhaler Inhale 2 puffs into the lungs 4 times daily as needed for Wheezing 1 Inhaler 5    vitamin D (ERGOCALCIFEROL) 81988 units CAPS capsule Take 1 capsule by mouth once a week 12 capsule 1    folic acid (FOLVITE) 1 MG tablet Take 1 tablet by mouth daily 90 tablet 1     No current facility-administered medications on file prior to visit. Allergies: Nancie Fish has No Known Allergies.     Past Medical History:   Diagnosis Date    Acute respiratory failure with hypoxia ([facility] [address] 10/16/2020    Alcohol withdrawal syndrome, with 7/28/2021 [facility] CT SCAN    ENDOSCOPIC ULTRASOUND (LOWER) N/A 12/09/2020    ENDOSCOPIC ULTRASOUND, UPPER WITH LINEAR SCOPE FOR BIOPSY OF MASS ON HEAD OF PANCREAS performed by Laura Lagunas MD at [address] [facility] ERCP  08/11/2021    ERCP N/A 08/11/2021    ERCP STENT INSERTION performed by Alfredo Simmons MD at [facility]    ERCP  08/11/2021    ERCP SPHINCTER/PAPILLOTOMY performed by Alfredo Simmons MD at [facility]    ERCP  10/21/2021    ERCP STONE REMOVAL    ERCP N/A 10/21/2021    ERCP STENT REMOVAL/EXCHANGE performed by Alfredo Simmons MD at [address] ERCP  10/21/2021    ERCP STONE REMOVAL performed by Alfredo Simmons MD at [address] ERCP  10/21/2021    ERCP ENDOSCOPIC RETROGRADE CHOLANGIOPANCREATOGRAPHY DIRECT VISUALIZATION performed by Alfredo Simmons MD at [address] Left 07/03/2013    ORIF tibial plateau    FRACTURE SURGERY Right     small finger metacarpal fracture    HAND SURGERY      pins    HYSTERECTOMY  2003    SPINE SURGERY N/A 09/10/2021    KYPHOPLASTY lumbar L5 performed by Jose Estrada MD at [address] 10/22/2020    EGD BIOPSY performed by Hailee Martinez MD at [address] [place] [address] 04/05/2021    EGD BIOPSY performed by Hailee Martinez MD at [address] [place] [address] N/A 09/08/2021    ENDOSCOPIC ULTRASOUND, LINEAR SCOPE performed by Laura Lagunas MD at [facility] ENDO     Social History: Pradip Clarke  reports that she has been smoking cigarettes. She has a 30.00 pack-year smoking history. She has never used smokeless tobacco. She reports previous alcohol use. She reports previous drug use. Frequency: 2.00 times per week.     Family History   Problem Relation Age of Onset    Other Father     Cancer Maternal Grandmother     Heart Disease Paternal Grandmother     Esophageal Cancer Maternal Aunt     Arthritis Mother      On exam: Blood pressure 131/89, pulse 83, height 5' 5\" (1.651 m), weight 139 lb 11.2 oz (63.4 kg). NEUROLOGIC EXAMINATION  GENERAL  Appears comfortable and in no distress   HEENT  NC/ AT   NECK  Supple and no bruits heard   MENTAL STATUS:  Alert, oriented, intact memory, no confusion, normal speech, normal language, no hallucination or delusion; appropriate affect    CRANIAL NERVES: II     -      PERRLA, Fundi reveal intact venous pulsations; Visual fields intact to confrontation  III,IV,VI -  Horizontal nystagmus bilaterlly, no afferent defect, no CAMMIE, no ptosis  V     -     Normal facial sensation  VII    -     Normal facial symmetry  VIII   -     Intact hearing  IX,X -     Symmetrical palate  XI    -     Symmetrical shoulder shrug  XII   -     Midline tongue, no atrophy    MOTOR FUNCTION:  significant for good strength of grade 5/5 in bilateral proximal and distal muscle groups of both upper and lower extremities with normal bulk, normal tone and no involuntary movements, no tremor   SENSORY FUNCTION:  diminished PP, temp and vibration in distal lower extremities    CEREBELLAR FUNCTION:  Intact fine motor control over upper limbs   REFLEX FUNCTION:  Symmetric, no perverted reflex, no Babinski sign   STATION and GAIT  Normal station, wide based gait     Diagnostic data reviewed with the patient:   Lab Results   Component Value Date    SEDRATE 37 (H) 02/03/2021     Lab Results   Component Value Date    FDTCPDOU64 518 11/09/2021    VITAMINB6 8.3 (L) () 10/15/2021      Lab Results   Component Value Date    FOLATE 16.0 11/09/2021     Lab Results   Component Value Date    VITD25 51.1 11/09/2021     Lab Results   Component Value Date    BRADLEY NEGATIVE 04/13/2021     Lab Results   Component Value Date    TSH 0.68 12/23/2020       Lab Results   Component Value Date    LABA1C 5.5 04/13/2021       EEG 7/29/21:  abnormal EEG secondary to generalized slowing without focal or  paroxysmal abnormality. Carbamazepine (7/25/2021): Total level 7.7 (4-12). ,  Free level:

## 2021-12-17 ENCOUNTER — TELEPHONE (OUTPATIENT)
Dept: NEUROLOGY | Age: 51
End: 2021-12-17

## 2021-12-17 DIAGNOSIS — K86.0 ALCOHOL-INDUCED CHRONIC PANCREATITIS (HCC): ICD-10-CM

## 2021-12-17 DIAGNOSIS — R13.19 ESOPHAGEAL DYSPHAGIA: ICD-10-CM

## 2021-12-17 RX ORDER — OMEPRAZOLE 40 MG/1
CAPSULE, DELAYED RELEASE ORAL
Qty: 60 CAPSULE | Refills: 2 | Status: SHIPPED | OUTPATIENT
Start: 2021-12-17 | End: 2022-03-14

## 2021-12-17 NOTE — TELEPHONE ENCOUNTER
Jordin Dutta called in. We reveiewed what Dr. Consuelo Whalen had recommended for her migraines. I explained he was increasing the Topamax gradually and also sent through a medrol dose pack and she should pick that up at her pharmacy. She voiced understanding.

## 2021-12-20 RX ORDER — PANCRELIPASE 24000; 76000; 120000 [USP'U]/1; [USP'U]/1; [USP'U]/1
CAPSULE, DELAYED RELEASE PELLETS ORAL
Qty: 90 CAPSULE | Refills: 1 | Status: SHIPPED | OUTPATIENT
Start: 2021-12-20 | End: 2022-02-14

## 2021-12-20 NOTE — TELEPHONE ENCOUNTER
Last visit: 11/1/21  Last Med refill: 10/20/21  Does patient have enough medication for 72 hours: No:     Next Visit Date:  Future Appointments   Date Time Provider Lexi Anna   12/21/2021  1:00 PM Ambrocio Daily SheridanCarolyn Valderrama   12/28/2021  1:00 PM MARVIN Reddy [facility]   1/4/2022 11:15 AM Ludivina Huerta MD [facility] FP [facility]PP   1/11/2022  1:30 PM LOI Wall - JW PAZ TEL [address]   1/13/2022  3:50 PM Angela Light MD AFL [facility]ph [facility] None   1/18/2022  8:15 AM Rivera Shukla, PT [facility]Z SF PT [facility]   1/21/2022  1:30 PM Isabella Salguero MD sv gr lks TOLPP   3/17/2022  2:20 PM Cb Marcos MD Neuro Spec [address] Maintenance   Topic Date Due    Breast cancer screen  11/06/2021    Shingles Vaccine (2 of 2) 12/30/2021    Low dose CT lung screening  05/24/2022    Potassium monitoring  12/16/2022    Creatinine monitoring  12/16/2022    Lipid screen  12/23/2025    DTaP/Tdap/Td vaccine (2 - Td or Tdap) 12/28/2030    Colon cancer screen colonoscopy  11/19/2031    Pneumococcal 0-64 years Vaccine (2 of 2 - PPSV23) 07/05/2034    Flu vaccine  Completed    COVID-19 Vaccine  Completed    Hepatitis C screen  Completed    HIV screen  Completed    Hepatitis A vaccine  Aged Out    Hepatitis B vaccine  Aged Out    Hib vaccine  Aged Out    Meningococcal (ACWY) vaccine  Aged Out       Hemoglobin A1C (%)   Date Value   04/13/2021 5.5   07/14/2019 4.3             ( goal A1C is < 7)   No results found for: LABMICR  LDL Cholesterol (mg/dL)   Date Value   12/23/2020 134 (H)   06/18/2019 92       (goal LDL is <100)   AST (U/L)   Date Value   10/12/2021 14     ALT (U/L)   Date Value   10/12/2021 7     BUN (mg/dL)   Date Value   12/16/2021 6     BP Readings from Last 3 Encounters:   12/16/21 131/89   11/19/21 133/89   11/19/21 (!) 153/108          (goal 120/80)    All Future Testing planned in [facility]  Lab Frequency Next Occurrence   Cancer Antigen 19-9 Once 02/12/2021   EGD Once 03/30/2021   SLP videofluoroscopic swallow study Once 07/31/2021   COVID-19 Once 05/19/2021   CT ABDOMEN PELVIS W WO CONTRAST Additional Contrast? Radiologist Recommendation Once 09/10/2021   DEXA VERTEBRAL FRACTURE ASSESSMENT Once 08/31/2021   FACET JOINT L/S SINGLE Once 09/16/2021   EUS Once 09/29/2021   ERCP Once 09/29/2021   Sodium Once 12/04/2021   TAMMIE Digital Screen Bilateral [[id number]] Once 01/17/2022   COLONOSCOPY (Diagnostic) Once 12/05/2021   OT functional capacity evaluation (FCE) Once 41/07/5832   Basic Metabolic Panel Once a week 12/24/2021               Patient Active Problem List:     Acute bronchitis     Reflex sympathetic dystrophy of lower limb     Essential hypertension     Nicotine addiction     Psychophysiological insomnia     Anxiety     Alcoholic peripheral neuropathy (HCC)     Hypokalemia     Macrocytic anemia     Hypomagnesemia     Tobacco use     Ambulatory dysfunction     Seizure disorder (HCC)     COPD with acute exacerbation (HCC)     Paresthesia of lower extremity     Acute respiratory failure with hypoxia (HCC)     Pancreatic cyst     Recurrent major depressive disorder (HCC)     Elevated CA 19-9 level     Perineal mass, female     Esophagitis candidal      Condyloma     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24, the patient underwent 2 surgical resections without known recurrence     Alcohol use disorder, severe, in early remission ([address]     Metabolic encephalopathy     AMAN (generalized anxiety disorder)     Major depressive disorder, recurrent severe without psychotic features ([address]     Esophageal dysphagia     Chronic peripheral neuropathic pain     History of alcohol abuse     History of petit-mal seizures     Intractable episodic tension-type headache     Personal history of astrocytoma     Multilevel spine pain     Chronic pain syndrome     Marijuana abuse     Severe sepsis ([address]     Closed fracture of fifth thoracic vertebra ([address] Diverticulitis of large intestine with abscess without bleeding     Elevated brain natriuretic peptide (BNP) level     Elevated alkaline phosphatase level     Chronic pancreatitis (HCC)     Erythema ab igne     Compression fracture of thoracic vertebra (HCC)     Pathological compression fracture of lumbar vertebra with delayed healing     Metabolic acidosis with increased anion gap and accumulation of organic acids     Community acquired pneumonia of left lower lobe of lung     Septicemia ([address]     Alcohol-induced acute pancreatitis     Fluid collection of pancreas     Diverticulitis of large intestine without perforation or abscess with bleeding     Pseudocyst of pancreas     Bandemia     Sepsis ([address]     Acute recurrent pancreatitis     Atelectasis of left lung     Hyponatremia     Iron deficiency anemia     Adenomatous polyp of sigmoid colon     Moderate episode of recurrent major depressive disorder ([address]

## 2021-12-21 ENCOUNTER — HOSPITAL ENCOUNTER (OUTPATIENT)
Dept: PSYCHIATRY | Age: 51
Setting detail: THERAPIES SERIES
Discharge: HOME | End: 2021-12-21
Payer: MEDICARE

## 2021-12-21 ENCOUNTER — HOSPITAL ENCOUNTER (OUTPATIENT)
Age: 51
Discharge: HOME | End: 2021-12-21
Payer: MEDICARE

## 2021-12-21 DIAGNOSIS — N39.41 URGE INCONTINENCE OF URINE: ICD-10-CM

## 2021-12-21 DIAGNOSIS — E87.1 HYPONATREMIA: ICD-10-CM

## 2021-12-21 LAB
ANION GAP SERPL CALCULATED.3IONS-SCNC: 13 MMOL/L (ref 9–17)
BUN BLDV-MCNC: 17 MG/DL (ref 6–20)
BUN/CREAT BLD: ABNORMAL (ref 9–20)
CALCIUM SERPL-MCNC: 9.6 MG/DL (ref 8.6–10.4)
CHLORIDE BLD-SCNC: 99 MMOL/L (ref 98–107)
CO2: 21 MMOL/L (ref 20–31)
CREAT SERPL-MCNC: 0.61 MG/DL (ref 0.5–0.9)
GFR AFRICAN AMERICAN: >60 ML/MIN
GFR NON-AFRICAN AMERICAN: >60 ML/MIN
GFR SERPL CREATININE-BSD FRML MDRD: ABNORMAL ML/MIN/{1.73_M2}
GFR SERPL CREATININE-BSD FRML MDRD: ABNORMAL ML/MIN/{1.73_M2}
GLUCOSE BLD-MCNC: 124 MG/DL (ref 70–99)
POTASSIUM SERPL-SCNC: 4.3 MMOL/L (ref 3.7–5.3)
SODIUM BLD-SCNC: 133 MMOL/L (ref 135–144)

## 2021-12-21 PROCEDURE — 80048 BASIC METABOLIC PNL TOTAL CA: CPT

## 2021-12-21 PROCEDURE — 36415 COLL VENOUS BLD VENIPUNCTURE: CPT

## 2021-12-21 PROCEDURE — 90837 PSYTX W PT 60 MINUTES: CPT | Performed by: SOCIAL WORKER

## 2021-12-22 ENCOUNTER — TELEPHONE (OUTPATIENT)
Dept: NEUROLOGY | Age: 51
End: 2021-12-22

## 2021-12-22 DIAGNOSIS — H55.00 NYSTAGMUS: ICD-10-CM

## 2021-12-22 DIAGNOSIS — R53.83 OTHER FATIGUE: ICD-10-CM

## 2021-12-22 DIAGNOSIS — G43.019 INTRACTABLE MIGRAINE WITHOUT AURA AND WITHOUT STATUS MIGRAINOSUS: Primary | ICD-10-CM

## 2021-12-22 DIAGNOSIS — R90.82 WHITE MATTER ABNORMALITY ON MRI OF BRAIN: ICD-10-CM

## 2021-12-22 RX ORDER — TOPIRAMATE 50 MG/1
TABLET, FILM COATED ORAL
Qty: 120 TABLET | Refills: 3 | Status: SHIPPED | OUTPATIENT
Start: 2021-12-22 | End: 2022-07-11 | Stop reason: SDUPTHER

## 2021-12-22 NOTE — TELEPHONE ENCOUNTER
Called and spoke to patient regarding her nystagmus. She stated that she still has ongoing nystagmus. She also has extreme unprovoked fatigue. We will get MRI brain (with/without) in further evaluation of nystagmus and unprovoked fatigue and also to follow-up on prior white matter change. We will also send a new prescription for Topamax 50 mg two tab bid.      Akira Terry MD 12/22/2021 3:39 PM

## 2021-12-22 NOTE — PSYCHOTHERAPY
keep \"filling her cup\" in order to keep giving from it. Therapist used imagery and metaphors to lead pt further into discussion. Pt will think about what fills her cup. O:  MSE:     Appearance    alert, cooperative, mild distress  Appetite normal  Sleep disturbance No  Fatigue No  Loss of pleasure Yes  Impulsive behavior No  Speech    normal rate, normal volume and well articulated  Mood    Anxious  Depressed  Affect    depressed affect  Thought Content    intrusive thoughts, cognitive distortions and all or nothing thinking  Thought Process    linear, goal directed and coherent  Associations    logical connections  Insight    Good  Judgment    Intact  Orientation    oriented to person, place, time, and general circumstances  Memory    recent and remote memory intact  Attention/Concentration    intact  Morbid ideation No  Suicide Assessment    no suicidal ideation    A:    Pt presented to therapy in an anxious and depressed mood that gradually elevated as session progressed. Pt came with topics she wanted to discuss and reported feeling empowered when she advocated for herself with her doctor and reframed thoughts to promote balanced thinking. Pt struggles with people's treatment of her and self-esteem, still repeating her F's words in her head. Pt is still feeling on guard and has a lot of difficult feelings. Therapist will assess for nightmares/intrusive memories to continue with PTSD diagnosis. Pt will rule out Anxiety and Depression.         Visit Diagnosis:   PTSD  R/O Major Depression  R/O Generalized Anxiety Disorder       History from Medical Record:        Diagnosis Date    Acute respiratory failure with hypoxia ([facility] 10/16/2020    Alcohol withdrawal syndrome, with delirium ([facility] 12/14/2019    Alcoholism ([facility]     Anemia 10/2020    GI bleed    Anxiety     Astrocytoma ([facility] - 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24, the patient underwent 2 surgical resections without known recurrence 10/23/2020    at age 24  Closed fracture of lateral portion of left tibial plateau 94/07/9125    COPD (chronic obstructive pulmonary disease) [facility]     CO2 retainer, on Bipap at night for this, Dr. Fransisco Seo ( last visit 11/20/2020 and note on chart )    Depression     bipolar, major depressive disorder, ptsd, anxiety    Dysphagia     GI bleed 10/2020    Hypertension     Memory loss     Oxygen dependent     USES AT NIGHT - 3L/MIN    Pain, joint, ankle and foot     Pancreatic lesion 10/2020    Dr. Duy Navarro working up pt    Peripheral neuropathy     Seizures ([facility] [address] [facility]     LAST SEIZURE 3/2020 - FEELS IT WAS FROM ALCOHOL WITHDRAWL    Tension headache     Under care of team 09/29/2021    neuro-Dr Coyle-[facility] ct-last visit sep 2021    Under care of team 09/29/2021    pain management-Hamzah Martines-nery jimenez-last visit sep 2021    Under care of team 09/29/2021    pulmonology-Dr Dueñas-[facility]-due for visit oct 26/2021    Under care of team 09/29/2021    psych-bahnfeldt NP-telemed-last visit 10/ 2021    Under care of team 09/29/2021    [address]-last visit aug 2021    Under care of team     NEPHROLOGY - DR. ALMANZA.     Wears glasses     Wears partial dentures     UPPER    Wellness examination 09/29/2021    pcp-Ludivina Grimm-[address]-last visit june 2021     Medications:   Current Outpatient Medications   Medication Sig Dispense Refill    CREON 59768-48703 units delayed release capsule TAKE ONE CAPSULE BY MOUTH THREE TIMES A DAY WITH MEALS 90 capsule 1    omeprazole (PRILOSEC) 40 MG delayed release capsule TAKE ONE CAPSULE BY MOUTH TWICE A DAY 60 capsule 2    methylPREDNISolone (MEDROL, CHARISMA,) 4 MG tablet Take as directed 21 tablet 0    DULoxetine 40 MG CPEP Take 40 mg by mouth daily 30 capsule 0    hydrOXYzine (ATARAX) 25 MG tablet Take 1 tablet by mouth 3 times daily as needed for Anxiety 90 tablet 3    busPIRone (BUSPAR) 15 MG tablet Take 15 mg by mouth three times daily 90 tablet 1    escitalopram (LEXAPRO) 10 MG tablet Take 1 tablet by mouth daily 30 tablet 1    traZODone (DESYREL) 50 MG tablet TAKE ONE TABLET BY MOUTH ONCE NIGHTLY AS NEEDED FOR SLEEP 30 tablet 0    carBAMazepine (TEGRETOL) 200 MG tablet TAKE ONE TABLET BY MOUTH THREE TIMES A DAY 90 tablet 0    polyethylene glycol (GOLYTELY) 236 g solution Follow instructions provided by physicians office 4000 mL 0    bisacodyl (BISACODYL) 5 MG EC tablet Take 4 tabs at 10am the day prior to Colonoscopy 4 tablet 0    polyethylene glycol (GLYCOLAX) 17 GM/SCOOP powder Use as directed following your patient instructions given by office 51 g 0    fluticasone-umeclidin-vilant (TRELEGY ELLIPTA) 100-62.5-25 MCG/INH AEPB Inhale 1 puff into the lungs daily 1 each 5    denosumab (PROLIA) 60 MG/ML SOSY SC injection Inject 1 mL into the skin every 6 months (Patient not taking: Reported on 12/16/2021) 1 mL 1    ondansetron (ZOFRAN ODT) 4 MG disintegrating tablet Take 1 tablet by mouth every 8 hours as needed for Nausea or Vomiting 10 tablet 0    sodium chloride 1 g tablet Take 1 tablet by mouth 4 times daily 120 tablet 3    pregabalin (LYRICA) 200 MG capsule Take 1 capsule by mouth 3 times daily for 180 days.  90 capsule 5    topiramate (TOPAMAX) 50 MG tablet TAKE ONE TABLET BY MOUTH TWICE A  tablet 1    spironolactone (ALDACTONE) 50 MG tablet TAKE ONE TABLET BY MOUTH DAILY (Patient not taking: Reported on 12/16/2021) 90 tablet 1    amLODIPine (NORVASC) 5 MG tablet TAKE ONE TABLET BY MOUTH DAILY 90 tablet 1    simethicone (MYLICON) 80 MG chewable tablet Take 1 tablet by mouth 4 times daily as needed for Flatulence 180 tablet 3    metoclopramide (REGLAN) 5 MG tablet Take 1 tablet by mouth 3 times daily 120 tablet 2    rOPINIRole (REQUIP) 1 MG tablet Take 1 tablet by mouth nightly 90 tablet 1    budesonide-formoterol (SYMBICORT) 160-4.5 MCG/ACT AERO Inhale 2 puffs into the lungs 2 times daily (Patient not taking: Reported on 12/16/2021) 3 Inhaler 1    tiotropium (SPIRIVA RESPIMAT) 2.5 MCG/ACT AERS inhaler Inhale 2 puffs into the lungs daily (Patient not taking: Reported on 12/16/2021) 1 Inhaler 11    albuterol sulfate HFA (VENTOLIN HFA) 108 (90 Base) MCG/ACT inhaler Inhale 2 puffs into the lungs 4 times daily as needed for Wheezing (Patient not taking: Reported on 12/16/2021) 1 Inhaler 5    vitamin D (ERGOCALCIFEROL) 47836 units CAPS capsule Take 1 capsule by mouth once a week 12 capsule 1    folic acid (FOLVITE) 1 MG tablet Take 1 tablet by mouth daily 90 tablet 1     No current facility-administered medications for this encounter. Social History:   Social History     Socioeconomic History    Marital status: Single     Spouse name: Not on file    Number of children: Not on file    Years of education: Not on file    Highest education level: Not on file   Occupational History    Not on file   Tobacco Use    Smoking status: Current Every Day Smoker     Packs/day: 1.00     Years: 30.00     Pack years: 30.00     Types: Cigarettes    Smokeless tobacco: Never Used   Vaping Use    Vaping Use: Never used   Substance and Sexual Activity    Alcohol use: Not Currently     Alcohol/week: 0.0 standard drinks    Drug use: Not Currently     Frequency: 2.0 times per week     Comment: 6 months ago    Sexual activity: Not Currently   Other Topics Concern    Not on file   Social History Narrative    Not on file     Social Determinants of Health     Financial Resource Strain: Medium Risk    Difficulty of Paying Living Expenses: Somewhat hard   Food Insecurity: No Food Insecurity    Worried About Running Out of Food in the Last Year: Never true    [place] of Food in the Last Year: Never true   Transportation Needs:     Lack of Transportation (Medical): Not on file    Lack of Transportation (Non-Medical):  Not on file   Physical Activity:     Days of Exercise per Week: Not on file    Minutes of Exercise per Session: Not on file   Stress:    

## 2021-12-22 NOTE — TELEPHONE ENCOUNTER
[facility] called asking if Dr. Ramirez Ayala can write a new Rx for her Topamax 50 mg to reflect his recommended changes. The recommendation was for patient to take 50 mg 2 tabs BID.

## 2021-12-23 DIAGNOSIS — S22.050D COMPRESSION FRACTURE OF T5 VERTEBRA WITH ROUTINE HEALING, SUBSEQUENT ENCOUNTER: Primary | ICD-10-CM

## 2021-12-23 NOTE — TELEPHONE ENCOUNTER
[facility] states her RT shoulder and upper back is really hurting and is requesting a small supply of Tramadol to help with the pain.     Patient is doing PT    Last visit: 11/02/2021  Last Med refill: 09/2021  Does patient have enough medication for 72 hours: No:     Next Visit Date:  Future Appointments   Date Time Provider Lexi Castillo   12/28/2021  1:00 PM KEESHA SteinbergW [facility]   1/4/2022 11:15 AM Ludivina Chung MD [facility] FP MHTOLPP   1/11/2022  1:30 PM LOI Shetty - NP PAZ TEL [address]   1/13/2022  3:50 PM Mary Alves MD AFL Neph [facility] None   1/18/2022  8:15 AM [place] Skates, PT STVZ SF PT [facility]   1/21/2022  1:30 PM Ester Yu MD sv gr lks [facility]   3/17/2022  2:20 PM Delia Morgan MD Neuro Spec [address] Maintenance   Topic Date Due    Breast cancer screen  11/06/2021    Shingles Vaccine (2 of 2) 12/30/2021    Low dose CT lung screening  05/24/2022    Potassium monitoring  12/21/2022    Creatinine monitoring  12/21/2022    Lipid screen  12/23/2025    DTaP/Tdap/Td vaccine (2 - Td or Tdap) 12/28/2030    Colon cancer screen colonoscopy  11/19/2031    Pneumococcal 0-64 years Vaccine (2 of 2 - PPSV23) 07/05/2034    Flu vaccine  Completed    COVID-19 Vaccine  Completed    Hepatitis C screen  Completed    HIV screen  Completed    Hepatitis A vaccine  Aged Out    Hepatitis B vaccine  Aged Out    Hib vaccine  Aged Out    Meningococcal (ACWY) vaccine  Aged Out       Hemoglobin A1C (%)   Date Value   04/13/2021 5.5   07/14/2019 4.3             ( goal A1C is < 7)   No results found for: LABMICR  LDL Cholesterol (mg/dL)   Date Value   12/23/2020 134 (H)   06/18/2019 92       (goal LDL is <100)   AST (U/L)   Date Value   10/12/2021 14     ALT (U/L)   Date Value   10/12/2021 7     BUN (mg/dL)   Date Value   12/21/2021 17     BP Readings from Last 3 Encounters:   12/16/21 131/89   11/19/21 133/89   11/19/21 (!) 153/108 (goal 120/80)    All Future Testing planned in [facility]  Lab Frequency Next Occurrence   Cancer Antigen 19-9 Once 02/12/2021   EGD Once 03/30/2021   SLP videofluoroscopic swallow study Once 07/31/2021   COVID-19 Once 05/19/2021   CT ABDOMEN PELVIS W WO CONTRAST Additional Contrast? Radiologist Recommendation Once 09/10/2021   DEXA VERTEBRAL FRACTURE ASSESSMENT Once 08/31/2021   FACET JOINT L/S SINGLE Once 09/16/2021   EUS Once 09/29/2021   ERCP Once 09/29/2021   TAMMIE Digital Screen Bilateral [[id number]] Once 01/17/2022   COLONOSCOPY (Diagnostic) Once 12/05/2021   OT functional capacity evaluation (FCE) Once 12/14/2021   MRI BRAIN W WO CONTRAST Once 17/55/1744   Basic Metabolic Panel Once a week 12/24/2021               Patient Active Problem List:     Acute bronchitis     Reflex sympathetic dystrophy of lower limb     Essential hypertension     Nicotine addiction     Psychophysiological insomnia     Anxiety     Alcoholic peripheral neuropathy (HCC)     Hypokalemia     Macrocytic anemia     Hypomagnesemia     Tobacco use     Ambulatory dysfunction     Seizure disorder ([address]     COPD with acute exacerbation (HCC)     Paresthesia of lower extremity     Acute respiratory failure with hypoxia (HCC)     Pancreatic cyst     Recurrent major depressive disorder (HCC)     Elevated CA 19-9 level     Perineal mass, female     Esophagitis candidal      Condyloma     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24, the patient underwent 2 surgical resections without known recurrence     Alcohol use disorder, severe, in early remission ([address]     Metabolic encephalopathy     AMAN (generalized anxiety disorder)     Major depressive disorder, recurrent severe without psychotic features ([address]     Esophageal dysphagia     Chronic peripheral neuropathic pain     History of alcohol abuse     History of petit-mal seizures     Intractable episodic tension-type headache     Personal history of astrocytoma     Multilevel spine pain     Chronic pain syndrome     Marijuana abuse     Severe sepsis (HCC)     Closed fracture of fifth thoracic vertebra (HCC)     Diverticulitis of large intestine with abscess without bleeding     Elevated brain natriuretic peptide (BNP) level     Elevated alkaline phosphatase level     Chronic pancreatitis (HCC)     Erythema ab igne     Compression fracture of thoracic vertebra (HCC)     Pathological compression fracture of lumbar vertebra with delayed healing     Metabolic acidosis with increased anion gap and accumulation of organic acids     Community acquired pneumonia of left lower lobe of lung     Septicemia ([address]     Alcohol-induced acute pancreatitis     Fluid collection of pancreas     Diverticulitis of large intestine without perforation or abscess with bleeding     Pseudocyst of pancreas     Bandemia     Sepsis ([address]     Acute recurrent pancreatitis     Atelectasis of left lung     Hyponatremia     Iron deficiency anemia     Adenomatous polyp of sigmoid colon     Moderate episode of recurrent major depressive disorder ([address]

## 2021-12-27 RX ORDER — TRAMADOL HYDROCHLORIDE 50 MG/1
50 TABLET ORAL EVERY 8 HOURS PRN
Qty: 20 TABLET | Refills: 0 | Status: SHIPPED | OUTPATIENT
Start: 2021-12-27 | End: 2022-08-01

## 2021-12-27 NOTE — TELEPHONE ENCOUNTER
Patient notified of Topamax increase and voiced understanding. She said she just completed her steroid and her migraines have subsided but she is expeirencing some muscle spasms in her upper abdomen. I told her I pineda notify you and to keep an eye on them and call back here or her PCP if symptoms worsen. Patient also scheduled her MRI for 12/31/2021. Please advise.

## 2021-12-28 NOTE — TELEPHONE ENCOUNTER
Joanne  I did d/w patient during last office visit about abdomen spasms; she needs to talk to her pcp. I am not sure about those.    Please let the patient know that   Thank you.   -dr. Stacey Palomares

## 2021-12-29 NOTE — TELEPHONE ENCOUNTER
Patient notified and said she will be seeing her GI doctor soon and will let him know as well as her PCP.

## 2021-12-30 ENCOUNTER — HOSPITAL ENCOUNTER (OUTPATIENT)
Age: 51
Discharge: HOME | End: 2021-12-30
Payer: MEDICARE

## 2021-12-30 ENCOUNTER — HOSPITAL ENCOUNTER (OUTPATIENT)
Dept: PSYCHIATRY | Age: 51
Setting detail: THERAPIES SERIES
Discharge: HOME | End: 2021-12-30
Payer: MEDICARE

## 2021-12-30 LAB
ANION GAP SERPL CALCULATED.3IONS-SCNC: 13 MMOL/L (ref 9–17)
BUN BLDV-MCNC: 10 MG/DL (ref 6–20)
BUN/CREAT BLD: ABNORMAL (ref 9–20)
CALCIUM SERPL-MCNC: 9.3 MG/DL (ref 8.6–10.4)
CHLORIDE BLD-SCNC: 103 MMOL/L (ref 98–107)
CO2: 21 MMOL/L (ref 20–31)
CREAT SERPL-MCNC: 0.46 MG/DL (ref 0.5–0.9)
GFR AFRICAN AMERICAN: >60 ML/MIN
GFR NON-AFRICAN AMERICAN: >60 ML/MIN
GFR SERPL CREATININE-BSD FRML MDRD: ABNORMAL ML/MIN/{1.73_M2}
GFR SERPL CREATININE-BSD FRML MDRD: ABNORMAL ML/MIN/{1.73_M2}
GLUCOSE BLD-MCNC: 103 MG/DL (ref 70–99)
POTASSIUM SERPL-SCNC: 4.3 MMOL/L (ref 3.7–5.3)
SODIUM BLD-SCNC: 137 MMOL/L (ref 135–144)

## 2021-12-30 PROCEDURE — 90837 PSYTX W PT 60 MINUTES: CPT | Performed by: SOCIAL WORKER

## 2021-12-30 PROCEDURE — 36415 COLL VENOUS BLD VENIPUNCTURE: CPT

## 2021-12-30 PROCEDURE — 80048 BASIC METABOLIC PNL TOTAL CA: CPT

## 2021-12-30 NOTE — PSYCHOTHERAPY
[facility] Therapy Note in [facility], [address]   12/30/2021  1:00 PM  Bennett Valente  1970  [id number]    Time spent with Patient: 60 minutes      [facility] Discussed with patient model of service to include the limits of confidentiality (i.e. abuse reporting, suicide intervention, etc.) and short-term intervention focused approach. Pt indicated understanding. S:  Pt presented to therapy and was engaged in session. Pt and therapist engaged in a check in and processed pt's holiday. Pt reported she had a good Dago for the most part and enjoyed seeing her family. Pt reports her step-sister's  committed suicide and she is not feeling much of anything about it. Pt reports that she feels sad for her step-sister and her niece, but didn't know him well so isn't really sure how to feel. Therapist normalized feelings of shock and difficulty expressing/understanding emotions right away. Therapist and pt also processed pt's anxiety surrounding her upcoming functional assessment test for disability. Pt and therapist problem solved ways to resolve some anxiety and pt will speak with her  and brother in law about it as he is a  as well. Pt and therapist pt's fears with the unknown. Pt and therapist discussed her fear over being denied disability and what will happen if she is. Pt and therapist discussed pt's need to grieve the loss of her job and former life, pt's anger with herself, and the need to forgive herself. When asked what she'd like to say to her past self pt broke down in tears and said she couldn't do it. Pt and therapist processed regret. Pt and therapist also discussed \"what fills pt's cup\" and therapist encouraged pt to do those things as much as possible.          O:  MSE:     Appearance    alert, crying, moderate distress  Appetite normal  Sleep disturbance No  Fatigue Yes  Loss of pleasure Yes  Impulsive behavior No  Speech    normal rate, normal volume and well articulated  Mood    Guilty  Depressed  Worthless  Affect    depressed affect, anxious  Thought Content    hopelessness, worthlessness and excessive guilt  Thought Process    linear, goal directed and coherent  Associations    logical connections  Insight    Good  Judgment    Intact  Orientation    oriented to person, place, time, and general circumstances  Memory    recent and remote memory intact  Attention/Concentration    intact  Morbid ideation No  Suicide Assessment    no suicidal ideation    A:  Pt presented to therapy as depressed and anxious about her upcoming tests. Pt was able to process through and come to conclusion that she is angry at herself for her drinking and is grieving her old life and the time she won't get back from drinking. Pt has a lot of regrets and was not able to speak to herself in the past because she is so angry. Pt is holding on to her past choices and is struggling to let them go, but she does not think she has confirmed her F's words about her.           Visit Diagnosis:   PTSD      History from Medical Record:        Diagnosis Date    Acute respiratory failure with hypoxia ([address] 10/16/2020    Alcohol withdrawal syndrome, with delirium ([address] 12/14/2019    Alcoholism ([address]     Anemia 10/2020    GI bleed    Anxiety     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24, the patient underwent 2 surgical resections without known recurrence 10/23/2020    at age 24    Closed fracture of lateral portion of left tibial plateau 08/17/7959    COPD (chronic obstructive pulmonary disease) ([address]     CO2 retainer, on Bipap at night for this, Dr. Rocio Dalton ( last visit 11/20/2020 and note on chart )    Depression     bipolar, major depressive disorder, ptsd, anxiety    Dysphagia     GI bleed 10/2020    Hypertension     Memory loss     Oxygen dependent     USES AT NIGHT - 3L/MIN    Pain, joint, ankle and foot (GOLYTELY) 236 g solution Follow instructions provided by physicians office 4000 mL 0    bisacodyl (BISACODYL) 5 MG EC tablet Take 4 tabs at 10am the day prior to Colonoscopy 4 tablet 0    polyethylene glycol (GLYCOLAX) 17 GM/SCOOP powder Use as directed following your patient instructions given by office 51 g 0    fluticasone-umeclidin-vilant (TRELEGY ELLIPTA) 100-62.5-25 MCG/INH AEPB Inhale 1 puff into the lungs daily 1 each 5    denosumab (PROLIA) 60 MG/ML SOSY SC injection Inject 1 mL into the skin every 6 months (Patient not taking: Reported on 12/16/2021) 1 mL 1    ondansetron (ZOFRAN ODT) 4 MG disintegrating tablet Take 1 tablet by mouth every 8 hours as needed for Nausea or Vomiting 10 tablet 0    sodium chloride 1 g tablet Take 1 tablet by mouth 4 times daily 120 tablet 3    pregabalin (LYRICA) 200 MG capsule Take 1 capsule by mouth 3 times daily for 180 days.  90 capsule 5    spironolactone (ALDACTONE) 50 MG tablet TAKE ONE TABLET BY MOUTH DAILY (Patient not taking: Reported on 12/16/2021) 90 tablet 1    amLODIPine (NORVASC) 5 MG tablet TAKE ONE TABLET BY MOUTH DAILY 90 tablet 1    simethicone (MYLICON) 80 MG chewable tablet Take 1 tablet by mouth 4 times daily as needed for Flatulence 180 tablet 3    metoclopramide (REGLAN) 5 MG tablet Take 1 tablet by mouth 3 times daily 120 tablet 2    rOPINIRole (REQUIP) 1 MG tablet Take 1 tablet by mouth nightly 90 tablet 1    budesonide-formoterol (SYMBICORT) 160-4.5 MCG/ACT AERO Inhale 2 puffs into the lungs 2 times daily (Patient not taking: Reported on 12/16/2021) 3 Inhaler 1    tiotropium (SPIRIVA RESPIMAT) 2.5 MCG/ACT AERS inhaler Inhale 2 puffs into the lungs daily (Patient not taking: Reported on 12/16/2021) 1 Inhaler 11    albuterol sulfate HFA (VENTOLIN HFA) 108 (90 Base) MCG/ACT inhaler Inhale 2 puffs into the lungs 4 times daily as needed for Wheezing (Patient not taking: Reported on 12/16/2021) 1 Inhaler 5    vitamin D (ERGOCALCIFEROL) 52245 units CAPS capsule Take 1 capsule by mouth once a week 12 capsule 1    folic acid (FOLVITE) 1 MG tablet Take 1 tablet by mouth daily 90 tablet 1     No current facility-administered medications for this encounter. Social History:   Social History     Socioeconomic History    Marital status: Single     Spouse name: Not on file    Number of children: Not on file    Years of education: Not on file    Highest education level: Not on file   Occupational History    Not on file   Tobacco Use    Smoking status: Current Every Day Smoker     Packs/day: 1.00     Years: 30.00     Pack years: 30.00     Types: Cigarettes    Smokeless tobacco: Never Used   Vaping Use    Vaping Use: Never used   Substance and Sexual Activity    Alcohol use: Not Currently     Alcohol/week: 0.0 standard drinks    Drug use: Not Currently     Frequency: 2.0 times per week     Comment: 6 months ago    Sexual activity: Not Currently   Other Topics Concern    Not on file   Social History Narrative    Not on file     Social Determinants of Health     Financial Resource Strain: Medium Risk    Difficulty of Paying Living Expenses: Somewhat hard   Food Insecurity: No Food Insecurity    Worried About Running Out of Food in the Last Year: Never true    Tyler of Food in the Last Year: Never true   Transportation Needs:     Lack of Transportation (Medical): Not on file    Lack of Transportation (Non-Medical):  Not on file   Physical Activity:     Days of Exercise per Week: Not on file    Minutes of Exercise per Session: Not on file   Stress:     Feeling of Stress : Not on file   Social Connections:     Frequency of Communication with Friends and Family: Not on file    Frequency of Social Gatherings with Friends and Family: Not on file    Attends Islam Services: Not on file    Active Member of Clubs or Organizations: Not on file    Attends Club or Organization Meetings: Not on file    Marital Status: Not on file Intimate Partner Violence:     Fear of Current or Ex-Partner: Not on file    Emotionally Abused: Not on file    Physically Abused: Not on file    Sexually Abused: Not on file   Housing Stability:     Unable to Pay for Housing in the Last Year: Not on file    Number of [place] in the Last Year: Not on file    Unstable Housing in the Last Year: Not on file       TOBACCO:   reports that she has been smoking cigarettes. She has a 30.00 pack-year smoking history. She has never used smokeless tobacco.  ETOH:   reports previous alcohol use. Family History:   Family History   Problem Relation Age of Onset    Other Father     Cancer Maternal Grandmother     Heart Disease Paternal Grandmother     Esophageal Cancer Maternal Aunt     Arthritis Mother            Pt interventions:  Provided education, Discussed self-care (sleep, nutrition, rewarding activities, social support, exercise), Established rapport, Supportive techniques, CBT to target guilt, Cognitive strategies to target negative thoughts including regret and Identified maladaptive thoughts        PLAN:   Explore guilt  Process regret  Discuss medical tests      Patient scheduled to return on:   Monday 1/3/22 at 12 PM   Were changes or additions made to the treatment plan today?   YES []   NO [x]  Noted changes:

## 2021-12-31 ENCOUNTER — HOSPITAL ENCOUNTER (OUTPATIENT)
Dept: MRI IMAGING | Age: 51
Discharge: HOME | End: 2022-01-02
Payer: MEDICARE

## 2021-12-31 DIAGNOSIS — H55.00 NYSTAGMUS: ICD-10-CM

## 2021-12-31 DIAGNOSIS — R53.83 OTHER FATIGUE: ICD-10-CM

## 2021-12-31 DIAGNOSIS — R90.82 WHITE MATTER ABNORMALITY ON MRI OF BRAIN: ICD-10-CM

## 2021-12-31 PROCEDURE — A9576 INJ PROHANCE MULTIPACK: HCPCS | Performed by: PSYCHIATRY & NEUROLOGY

## 2021-12-31 PROCEDURE — 6360000004 HC RX CONTRAST MEDICATION: Performed by: PSYCHIATRY & NEUROLOGY

## 2021-12-31 PROCEDURE — 70553 MRI BRAIN STEM W/O & W/DYE: CPT

## 2021-12-31 RX ADMIN — GADOTERIDOL 12 ML: 279.3 INJECTION, SOLUTION INTRAVENOUS at 16:09

## 2022-01-03 ENCOUNTER — HOSPITAL ENCOUNTER (OUTPATIENT)
Dept: PSYCHIATRY | Age: 52
Setting detail: THERAPIES SERIES
Discharge: HOME | End: 2022-01-03
Payer: MEDICARE

## 2022-01-03 PROCEDURE — 90837 PSYTX W PT 60 MINUTES: CPT | Performed by: SOCIAL WORKER

## 2022-01-03 NOTE — PSYCHOTHERAPY
[facility] Therapy Note in [facility], [address]   1/3/2022  12:00 PM  Antoinette Godinez  1970  [id number]    Time spent with Patient: 60 minutes      Pt was provided informed consent for the [facility]. Discussed with patient model of service to include the limits of confidentiality (i.e. abuse reporting, suicide intervention, etc.) and short-term intervention focused approach. Pt indicated understanding. S:  Pt presented to therapy as tired and in pain, but was engaged in session. Pt reports she has at the end of one of her \"episodes\" where she is in near constant pain, has to stay in bed, and reports cognitive difficulties. Therapist processed difficulties and anxieties surrounding this and how pt is addressing it. Pt reports that she is overwhelmed by her medical visits. Therapist used CBT techniques to help pt problem solve and process and recommended pt share information about these \"episodes\" with her doctor so they can have a clear picture of what is going on. Therapist also recommended pt send a message to her doctor with symptoms if she is worried about having to remember too much. Pt and therapist discussed when these episodes began and pt's recent thoughts. Pt reports she is working on being honest with others and herself, even when it is hard, and that she often thinks about \"how she got here. \"   Pt reports feeling that her Day Rodriguez is messed up\" and reports cognitive difficulties. Therapist and pt discussed pt's feelings of guilt and shame at her own choices because of how her body is now after she stopped drinking. Pt and therapist processed pt's blame of herself and pt admitted that on some level she feels she deserves it and that this punishment is teaching her a lesson. Pt and therapist discussed intention and guilt vs. Responsibility. Therapist encouraged pt to think about giving herself some christina and not punishing herself more.   Therapist asked pt to think about what she would say to a loved one if they were in he position. O:  MSE:     Appearance    alert, cooperative, crying, mild distress  Appetite normal  Sleep disturbance No  Fatigue Yes  Loss of pleasure No  Impulsive behavior No  Speech    normal rate, normal volume and well articulated  Mood    Anxious  Depressed  Affect    anxiety  Thought Content    intact  Thought Process    linear, goal directed and coherent  Associations    logical connections  Insight    Good  Judgment    Intact  Orientation    oriented to person, place, time, and general circumstances  Memory    recent and remote memory intact  Attention/Concentration    intact  Morbid ideation No  Suicide Assessment    no suicidal ideation    A:  Pt presented to therapy in pain and tired. She expressed being overwhelmed by what is going on physically and her thoughts. Pt struggles with \"beating herself up\" over things and is holding onto a lot of guilt surrounding her drinking. Pt reports wanting answers about her health so she can deal with them. Pt also has negative thoughts surrounding what her life will look like now.           Visit Diagnosis:   PTSD      History from Medical Record:        Diagnosis Date    Acute respiratory failure with hypoxia ([facility] 10/16/2020    Alcohol withdrawal syndrome, with delirium ([address] 12/14/2019    Alcoholism ([address]     Anemia 10/2020    GI bleed    Anxiety     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24, the patient underwent 2 surgical resections without known recurrence 10/23/2020    at age 24    Closed fracture of lateral portion of left tibial plateau 03/32/7932    COPD (chronic obstructive pulmonary disease) ([address]     CO2 retainer, on Bipap at night for this, Dr. Lam Hinkle ( last visit 11/20/2020 and note on chart )    Depression     bipolar, major depressive disorder, ptsd, anxiety    Dysphagia     GI bleed 10/2020    Hypertension     Memory loss     Oxygen dependent     USES AT NIGHT - 3L/MIN    Pain, joint, ankle and foot     Pancreatic lesion 10/2020    Dr. Kai Goldmann working up pt    Peripheral neuropathy     Seizures ([address]     LAST SEIZURE 3/2020 - FEELS IT WAS FROM ALCOHOL WITHDRAWL    Tension headache     Under care of team 09/29/2021    neuro-Dr Coyle-[facility]-last visit sep 2021    Under care of team 09/29/2021    pain management-Hamzah Martines-[facility]-last visit sep 2021    Under care of team 09/29/2021    pulmonology-Dr Dueñas[facility]-due for visit oct 26/2021    Under care of team 09/29/2021    psych-bahnfeldt NP-telemed-last visit 10/ 2021    Under care of team 09/29/2021    pt-[address]-last visit aug 2021    Under care of team     NEPHROLOGY - DR. CHAPMAN  Wears glasses     Wears partial dentures     UPPER    Wellness examination 09/29/2021    pcp-Ludivina Grimm-[facility] cornelio-last visit june 2021     Medications:   Current Outpatient Medications   Medication Sig Dispense Refill    traMADol (ULTRAM) 50 MG tablet Take 1 tablet by mouth every 8 hours as needed (every 8 hrs ORN) for up to 7 days.  20 tablet 0    topiramate (TOPAMAX) 50 MG tablet Take 1.5 tab bid for 2 wk, then two tab bid 120 tablet 3    CREON 16188-12964 units delayed release capsule TAKE ONE CAPSULE BY MOUTH THREE TIMES A DAY WITH MEALS 90 capsule 1    omeprazole (PRILOSEC) 40 MG delayed release capsule TAKE ONE CAPSULE BY MOUTH TWICE A DAY 60 capsule 2    DULoxetine 40 MG CPEP Take 40 mg by mouth daily 30 capsule 0    hydrOXYzine (ATARAX) 25 MG tablet Take 1 tablet by mouth 3 times daily as needed for Anxiety 90 tablet 3    busPIRone (BUSPAR) 15 MG tablet Take 15 mg by mouth three times daily 90 tablet 1    escitalopram (LEXAPRO) 10 MG tablet Take 1 tablet by mouth daily 30 tablet 1    traZODone (DESYREL) 50 MG tablet TAKE ONE TABLET BY MOUTH ONCE NIGHTLY AS NEEDED FOR SLEEP 30 tablet 0    carBAMazepine (TEGRETOL) 200 MG tablet TAKE ONE TABLET BY MOUTH THREE TIMES A DAY 90 tablet 0    polyethylene glycol (GOLYTELY) 236 g solution Follow instructions provided by physicians office 4000 mL 0    bisacodyl (BISACODYL) 5 MG EC tablet Take 4 tabs at 10am the day prior to Colonoscopy 4 tablet 0    polyethylene glycol (GLYCOLAX) 17 GM/SCOOP powder Use as directed following your patient instructions given by office 51 g 0    fluticasone-umeclidin-vilant (TRELEGY ELLIPTA) 100-62.5-25 MCG/INH AEPB Inhale 1 puff into the lungs daily 1 each 5    denosumab (PROLIA) 60 MG/ML SOSY SC injection Inject 1 mL into the skin every 6 months (Patient not taking: Reported on 12/16/2021) 1 mL 1    ondansetron (ZOFRAN ODT) 4 MG disintegrating tablet Take 1 tablet by mouth every 8 hours as needed for Nausea or Vomiting 10 tablet 0    sodium chloride 1 g tablet Take 1 tablet by mouth 4 times daily 120 tablet 3    pregabalin (LYRICA) 200 MG capsule Take 1 capsule by mouth 3 times daily for 180 days.  90 capsule 5    spironolactone (ALDACTONE) 50 MG tablet TAKE ONE TABLET BY MOUTH DAILY (Patient not taking: Reported on 12/16/2021) 90 tablet 1    amLODIPine (NORVASC) 5 MG tablet TAKE ONE TABLET BY MOUTH DAILY 90 tablet 1    simethicone (MYLICON) 80 MG chewable tablet Take 1 tablet by mouth 4 times daily as needed for Flatulence 180 tablet 3    metoclopramide (REGLAN) 5 MG tablet Take 1 tablet by mouth 3 times daily 120 tablet 2    rOPINIRole (REQUIP) 1 MG tablet Take 1 tablet by mouth nightly 90 tablet 1    budesonide-formoterol (SYMBICORT) 160-4.5 MCG/ACT AERO Inhale 2 puffs into the lungs 2 times daily (Patient not taking: Reported on 12/16/2021) 3 Inhaler 1    tiotropium (SPIRIVA RESPIMAT) 2.5 MCG/ACT AERS inhaler Inhale 2 puffs into the lungs daily (Patient not taking: Reported on 12/16/2021) 1 Inhaler 11    albuterol sulfate HFA (VENTOLIN HFA) 108 (90 Base) MCG/ACT inhaler Inhale 2 puffs into the lungs 4 times daily as needed for Wheezing (Patient not taking: Reported on 12/16/2021) 1 Inhaler 5    vitamin D (ERGOCALCIFEROL) 93769 units CAPS capsule Take 1 capsule by mouth once a week 12 capsule 1    folic acid (FOLVITE) 1 MG tablet Take 1 tablet by mouth daily 90 tablet 1     No current facility-administered medications for this encounter. Social History:   Social History     Socioeconomic History    Marital status: Single     Spouse name: Not on file    Number of children: Not on file    Years of education: Not on file    Highest education level: Not on file   Occupational History    Not on file   Tobacco Use    Smoking status: Current Every Day Smoker     Packs/day: 1.00     Years: 30.00     Pack years: 30.00     Types: Cigarettes    Smokeless tobacco: Never Used   Vaping Use    Vaping Use: Never used   Substance and Sexual Activity    Alcohol use: Not Currently     Alcohol/week: 0.0 standard drinks    Drug use: Not Currently     Frequency: 2.0 times per week     Comment: 6 months ago    Sexual activity: Not Currently   Other Topics Concern    Not on file   Social History Narrative    Not on file     Social Determinants of Health     Financial Resource Strain: Medium Risk    Difficulty of Paying Living Expenses: Somewhat hard   Food Insecurity: No Food Insecurity    Worried About Running Out of Food in the Last Year: Never true    Tyler of Food in the Last Year: Never true   Transportation Needs:     Lack of Transportation (Medical): Not on file    Lack of Transportation (Non-Medical):  Not on file   Physical Activity:     Days of Exercise per Week: Not on file    Minutes of Exercise per Session: Not on file   Stress:     Feeling of Stress : Not on file   Social Connections:     Frequency of Communication with Friends and Family: Not on file    Frequency of Social Gatherings with Friends and Family: Not on file    Attends Jew Services: Not on file    Active Member of Clubs or Organizations: Not on file    Attends Club or Organization Meetings: Not on file    Marital Status: Not on file   Intimate Partner Violence:     Fear of Current or Ex-Partner: Not on file    Emotionally Abused: Not on file    Physically Abused: Not on file    Sexually Abused: Not on file   Housing Stability:     Unable to Pay for Housing in the Last Year: Not on file    Number of [place] in the Last Year: Not on file    Unstable Housing in the Last Year: Not on file       TOBACCO:   reports that she has been smoking cigarettes. She has a 30.00 pack-year smoking history. She has never used smokeless tobacco.  ETOH:   reports previous alcohol use. Family History:   Family History   Problem Relation Age of Onset    Other Father     Cancer Maternal Grandmother     Heart Disease Paternal Grandmother     Esophageal Cancer Maternal Aunt     Arthritis Mother            Pt interventions:  Discussed self-care (sleep, nutrition, rewarding activities, social support, exercise), Supportive techniques, CBT to target negative thoughts , Cognitive strategies to target guilt and shame  including blaming self for physical ailments  and Identified maladaptive thoughts        PLAN:   Discuss ways to reduce guilt  Cognitive strategies       Patient scheduled to return on:   Monday 1/10/22 at 1 PM     Were changes or additions made to the treatment plan today?   YES []   NO [x]  Noted changes:

## 2022-01-04 ENCOUNTER — TELEPHONE (OUTPATIENT)
Dept: PULMONOLOGY | Age: 52
End: 2022-01-04

## 2022-01-04 DIAGNOSIS — J44.0 CHRONIC OBSTRUCTIVE PULMONARY DISEASE WITH ACUTE LOWER RESPIRATORY INFECTION (HCC): Primary | ICD-10-CM

## 2022-01-04 NOTE — TELEPHONE ENCOUNTER
Last visit: 11/1/21  Last Med refill: 4/1/21  Does patient have enough medication for 72 hours: No:     Next Visit Date:  Future Appointments   Date Time Provider Lexi Anna   1/5/2022 10:15 AM Ludivina Arnold MD [facility]   1/10/2022  1:00 PM Jayesh Magallon, KEESHAW [facility]   1/11/2022  1:30 PM LOI Hernandez - JW PAZ TEL [address]   1/13/2022  3:50 PM Berta Chavez MD [facility] None   1/18/2022  8:15 AM Helen Pederson, PT [facility]   1/21/2022  1:30 PM Domonique Rasmussen MD sv gr lks [facility]   3/17/2022  2:20 PM Aamir Lemus MD Neuro Spec [address] Maintenance   Topic Date Due    Breast cancer screen  11/06/2021    Shingles Vaccine (2 of 2) 12/30/2021    Depression Monitoring  02/05/2022    Low dose CT lung screening  05/24/2022    Potassium monitoring  12/30/2022    Creatinine monitoring  12/30/2022    Lipid screen  12/23/2025    DTaP/Tdap/Td vaccine (2 - Td or Tdap) 12/28/2030    Colon cancer screen colonoscopy  11/19/2031    Pneumococcal 0-64 years Vaccine (2 of 2 - PPSV23) 07/05/2034    Flu vaccine  Completed    COVID-19 Vaccine  Completed    Hepatitis C screen  Completed    HIV screen  Completed    Hepatitis A vaccine  Aged Out    Hepatitis B vaccine  Aged Out    Hib vaccine  Aged Out    Meningococcal (ACWY) vaccine  Aged Out       Hemoglobin A1C (%)   Date Value   04/13/2021 5.5   07/14/2019 4.3             ( goal A1C is < 7)   No results found for: LABMICR  LDL Cholesterol (mg/dL)   Date Value   12/23/2020 134 (H)   06/18/2019 92       (goal LDL is <100)   AST (U/L)   Date Value   10/12/2021 14     ALT (U/L)   Date Value   10/12/2021 7     BUN (mg/dL)   Date Value   12/30/2021 10     BP Readings from Last 3 Encounters:   12/16/21 131/89   11/19/21 133/89   11/19/21 (!) 153/108          (goal 120/80)    All Future Testing planned in [facility]  Lab Frequency Next Occurrence   Cancer Antigen 19-9 Once 02/12/2021 EGD Once 03/30/2021   SLP videofluoroscopic swallow study Once 07/31/2021   COVID-19 Once 05/19/2021   CT ABDOMEN PELVIS W WO CONTRAST Additional Contrast? Radiologist Recommendation Once 09/10/2021   DEXA VERTEBRAL FRACTURE ASSESSMENT Once 08/31/2021   FACET JOINT L/S SINGLE Once 09/16/2021   EUS Once 09/29/2021   ERCP Once 09/29/2021   TAMMIE Digital Screen Bilateral [SPJ1590] Once 01/17/2022   COLONOSCOPY (Diagnostic) Once 12/05/2021   OT functional capacity evaluation (FCE) Once 12/14/2021               Patient Active Problem List:     Acute bronchitis     Reflex sympathetic dystrophy of lower limb     Essential hypertension     Nicotine addiction     Psychophysiological insomnia     Anxiety     Alcoholic peripheral neuropathy (HCC)     Hypokalemia     Macrocytic anemia     Hypomagnesemia     Tobacco use     Ambulatory dysfunction     Seizure disorder ([address]     COPD with acute exacerbation (HCC)     Paresthesia of lower extremity     Acute respiratory failure with hypoxia (HCC)     Pancreatic cyst     Recurrent major depressive disorder (HCC)     Elevated CA 19-9 level     Perineal mass, female     Esophagitis candidal      Condyloma     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24, the patient underwent 2 surgical resections without known recurrence     Alcohol use disorder, severe, in early remission ([address]     Metabolic encephalopathy     AMAN (generalized anxiety disorder)     Major depressive disorder, recurrent severe without psychotic features ([address]     Esophageal dysphagia     Chronic peripheral neuropathic pain     History of alcohol abuse     History of petit-mal seizures     Intractable episodic tension-type headache     Personal history of astrocytoma     Multilevel spine pain     Chronic pain syndrome     Marijuana abuse     Severe sepsis (HCC)     Closed fracture of fifth thoracic vertebra (HCC)     Diverticulitis of large intestine with abscess without bleeding     Elevated brain natriuretic peptide (BNP) level     Elevated alkaline phosphatase level     Chronic pancreatitis (HCC)     Erythema ab igne     Compression fracture of thoracic vertebra (HCC)     Pathological compression fracture of lumbar vertebra with delayed healing     Metabolic acidosis with increased anion gap and accumulation of organic acids     Community acquired pneumonia of left lower lobe of lung     Septicemia ([address]     Alcohol-induced acute pancreatitis     Fluid collection of pancreas     Diverticulitis of large intestine without perforation or abscess with bleeding     Pseudocyst of pancreas     Bandemia     Sepsis ([address]     Acute recurrent pancreatitis     Atelectasis of left lung     Hyponatremia     Iron deficiency anemia     Adenomatous polyp of sigmoid colon     Moderate episode of recurrent major depressive disorder ([address]

## 2022-01-04 NOTE — TELEPHONE ENCOUNTER
Pt is requesting an order for portable oxygen. She is currently on 3L. Last OV was 2/2021, next visit 2/2022. Pt was seen by resp. In 10/2021. [facility] or [phone]. Please advise.

## 2022-01-05 ENCOUNTER — HOSPITAL ENCOUNTER (OUTPATIENT)
Age: 52
Setting detail: SPECIMEN
Discharge: HOME | End: 2022-01-05

## 2022-01-05 ENCOUNTER — OFFICE VISIT (OUTPATIENT)
Dept: FAMILY MEDICINE CLINIC | Age: 52
End: 2022-01-05
Payer: MEDICARE

## 2022-01-05 VITALS
DIASTOLIC BLOOD PRESSURE: 84 MMHG | OXYGEN SATURATION: 95 % | TEMPERATURE: 98.2 F | RESPIRATION RATE: 20 BRPM | HEART RATE: 89 BPM | WEIGHT: 137.6 LBS | SYSTOLIC BLOOD PRESSURE: 128 MMHG | BODY MASS INDEX: 22.92 KG/M2 | HEIGHT: 65 IN

## 2022-01-05 DIAGNOSIS — I10 PRIMARY HYPERTENSION: ICD-10-CM

## 2022-01-05 DIAGNOSIS — Z01.818 PREOP EXAM FOR INTERNAL MEDICINE: Primary | ICD-10-CM

## 2022-01-05 DIAGNOSIS — J44.9 CHRONIC OBSTRUCTIVE PULMONARY DISEASE, UNSPECIFIED COPD TYPE (HCC): ICD-10-CM

## 2022-01-05 LAB
ANION GAP SERPL CALCULATED.3IONS-SCNC: 16 MMOL/L (ref 9–17)
BUN BLDV-MCNC: 5 MG/DL (ref 6–20)
BUN/CREAT BLD: ABNORMAL (ref 9–20)
CALCIUM SERPL-MCNC: 8.9 MG/DL (ref 8.6–10.4)
CHLORIDE BLD-SCNC: 97 MMOL/L (ref 98–107)
CO2: 18 MMOL/L (ref 20–31)
CREAT SERPL-MCNC: 0.53 MG/DL (ref 0.5–0.9)
GFR AFRICAN AMERICAN: >60 ML/MIN
GFR NON-AFRICAN AMERICAN: >60 ML/MIN
GFR SERPL CREATININE-BSD FRML MDRD: ABNORMAL ML/MIN/{1.73_M2}
GFR SERPL CREATININE-BSD FRML MDRD: ABNORMAL ML/MIN/{1.73_M2}
GLUCOSE BLD-MCNC: 104 MG/DL (ref 70–99)
POTASSIUM SERPL-SCNC: 5.7 MMOL/L (ref 3.7–5.3)
SODIUM BLD-SCNC: 131 MMOL/L (ref 135–144)

## 2022-01-05 PROCEDURE — G8482 FLU IMMUNIZE ORDER/ADMIN: HCPCS | Performed by: INTERNAL MEDICINE

## 2022-01-05 PROCEDURE — 4004F PT TOBACCO SCREEN RCVD TLK: CPT | Performed by: INTERNAL MEDICINE

## 2022-01-05 PROCEDURE — 3023F SPIROM DOC REV: CPT | Performed by: INTERNAL MEDICINE

## 2022-01-05 PROCEDURE — 3017F COLORECTAL CA SCREEN DOC REV: CPT | Performed by: INTERNAL MEDICINE

## 2022-01-05 PROCEDURE — G8420 CALC BMI NORM PARAMETERS: HCPCS | Performed by: INTERNAL MEDICINE

## 2022-01-05 PROCEDURE — G8427 DOCREV CUR MEDS BY ELIG CLIN: HCPCS | Performed by: INTERNAL MEDICINE

## 2022-01-05 PROCEDURE — 99214 OFFICE O/P EST MOD 30 MIN: CPT | Performed by: INTERNAL MEDICINE

## 2022-01-05 RX ORDER — ROPINIROLE 1 MG/1
TABLET, FILM COATED ORAL
Qty: 90 TABLET | Refills: 1 | Status: SHIPPED | OUTPATIENT
Start: 2022-01-05 | End: 2022-07-11

## 2022-01-05 NOTE — PROGRESS NOTES
Preoperative Consultation      Lizz Givens  YOB: 1970    Date of Service:  1/5/2022    Vitals:    01/05/22 0959   BP: 128/84   Pulse: 89   Resp: 20   Temp: 98.2 °F (36.8 °C)   SpO2: 95%   Weight: 137 lb 9.6 oz (62.4 kg)   Height: 5' 5\" (1.651 m)      Wt Readings from Last 2 Encounters:   01/05/22 137 lb 9.6 oz (62.4 kg)   12/16/21 139 lb 11.2 oz (63.4 kg)     BP Readings from Last 3 Encounters:   01/05/22 128/84   12/16/21 131/89   11/19/21 133/89        Chief Complaint   Patient presents with    3 Month Follow-Up     wheezing-steroid has not helpped.  Pre-op Exam     C[employer] ordered.  Generalized Body Aches     states in home self test was done right before new years-negative    Other     pain in her joints that are severe-states she can never get cmfortable.       No Known Allergies  Outpatient Medications Marked as Taking for the 1/5/22 encounter (Office Visit) with Julia Cohen MD   Medication Sig Dispense Refill    rOPINIRole (REQUIP) 1 MG tablet TAKE ONE TABLET BY MOUTH ONCE NIGHTLY 90 tablet 1    topiramate (TOPAMAX) 50 MG tablet Take 1.5 tab bid for 2 wk, then two tab bid 120 tablet 3    CREON 05596-41657 units delayed release capsule TAKE ONE CAPSULE BY MOUTH THREE TIMES A DAY WITH MEALS 90 capsule 1    omeprazole (PRILOSEC) 40 MG delayed release capsule TAKE ONE CAPSULE BY MOUTH TWICE A DAY 60 capsule 2    DULoxetine 40 MG CPEP Take 40 mg by mouth daily 30 capsule 0    hydrOXYzine (ATARAX) 25 MG tablet Take 1 tablet by mouth 3 times daily as needed for Anxiety 90 tablet 3    busPIRone (BUSPAR) 15 MG tablet Take 15 mg by mouth three times daily 90 tablet 1    escitalopram (LEXAPRO) 10 MG tablet Take 1 tablet by mouth daily 30 tablet 1    traZODone (DESYREL) 50 MG tablet TAKE ONE TABLET BY MOUTH ONCE NIGHTLY AS NEEDED FOR SLEEP 30 tablet 0    carBAMazepine (TEGRETOL) 200 MG tablet TAKE ONE TABLET BY MOUTH THREE TIMES A DAY 90 tablet 0    fluticasone-umeclidin-vilant (TRELEGY ELLIPTA) 100-62.5-25 MCG/INH AEPB Inhale 1 puff into the lungs daily 1 each 5    sodium chloride 1 g tablet Take 1 tablet by mouth 4 times daily 120 tablet 3    pregabalin (LYRICA) 200 MG capsule Take 1 capsule by mouth 3 times daily for 180 days. 90 capsule 5    amLODIPine (NORVASC) 5 MG tablet TAKE ONE TABLET BY MOUTH DAILY 90 tablet 1    simethicone (MYLICON) 80 MG chewable tablet Take 1 tablet by mouth 4 times daily as needed for Flatulence 180 tablet 3    metoclopramide (REGLAN) 5 MG tablet Take 1 tablet by mouth 3 times daily 120 tablet 2    albuterol sulfate HFA (VENTOLIN HFA) 108 (90 Base) MCG/ACT inhaler Inhale 2 puffs into the lungs 4 times daily as needed for Wheezing 1 Inhaler 5    vitamin D (ERGOCALCIFEROL) 30355 units CAPS capsule Take 1 capsule by mouth once a week 12 capsule 1    folic acid (FOLVITE) 1 MG tablet Take 1 tablet by mouth daily 90 tablet 1       This patient presents to the office today for a preoperative consultation at the request of surgeon, Dr. Saira Sanchez, who plans on performing Excision and fulguration of anal and perinal warts on January 25 at [place].  The current problem began several years ago, and symptoms have been worsening with time. Conservative therapy: N/A. Breathing feels okay, has been wheezing a little bit off and on. Not needing albuterol daily, using Trelegy daily. New body aches - joints hurt when she wakes up - started few months ago. Unable to get comfortable in bed or couch, walking around the house through the night. Episodes occur randomly - cant predict when they occur, usually once or twice a month lasting for 3-4 days. During episodes she cant eat or take meds, sleeping the whole day due to the pain.      Planned anesthesia: General   Known anesthesia problems: None   Bleeding risk: No recent or remote history of abnormal bleeding  Personal or FH of DVT/PE: No    Patient objection to receiving blood products: No    Patient Active Problem List   Diagnosis    Acute bronchitis    Reflex sympathetic dystrophy of lower limb    Essential hypertension    Nicotine addiction    Psychophysiological insomnia    Anxiety    Alcoholic peripheral neuropathy (HCC)    Hypokalemia    Macrocytic anemia    Hypomagnesemia    Tobacco use    Ambulatory dysfunction    Seizure disorder ([address]    COPD with acute exacerbation ([address]    Paresthesia of lower extremity    Acute respiratory failure with hypoxia (HCC)    Pancreatic cyst    Recurrent major depressive disorder (HCC)    Elevated CA 19-9 level    Perineal mass, female    Esophagitis candidal     Condyloma    Astrocytoma (HCC) - 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24, the patient underwent 2 surgical resections without known recurrence    Alcohol use disorder, severe, in early remission ([address]    Metabolic encephalopathy    AMAN (generalized anxiety disorder)    Major depressive disorder, recurrent severe without psychotic features ([address]    Esophageal dysphagia    Chronic peripheral neuropathic pain    History of alcohol abuse    History of petit-mal seizures    Intractable episodic tension-type headache    Personal history of astrocytoma    Multilevel spine pain    Chronic pain syndrome    Marijuana abuse    Severe sepsis (HCC)    Closed fracture of fifth thoracic vertebra (HCC)    Diverticulitis of large intestine with abscess without bleeding    Elevated brain natriuretic peptide (BNP) level    Elevated alkaline phosphatase level    Chronic pancreatitis (HCC)    Erythema ab igne    Compression fracture of thoracic vertebra (HCC)    Pathological compression fracture of lumbar vertebra with delayed healing    Metabolic acidosis with increased anion gap and accumulation of organic acids    Community acquired pneumonia of left lower lobe of lung    Septicemia ([address]    Alcohol-induced acute pancreatitis    Fluid collection of pancreas    Diverticulitis of large intestine without perforation or abscess with bleeding    Pseudocyst of pancreas    Bandemia    Sepsis (HCC)    Acute recurrent pancreatitis    Atelectasis of left lung    Hyponatremia    Iron deficiency anemia    Adenomatous polyp of sigmoid colon    Moderate episode of recurrent major depressive disorder (HCC)       Past Medical History:   Diagnosis Date    Acute respiratory failure with hypoxia ([address] 10/16/2020    Alcohol withdrawal syndrome, with delirium ([address] 12/14/2019    Alcoholism ([address]     Anemia 10/2020    GI bleed    Anxiety     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 Nusrat Jorgensen ( last visit 11/20/2020 and note on chart )    Depression     bipolar, major depressive disorder, ptsd, anxiety    Dysphagia     GI bleed 10/2020    Hypertension     Memory loss     Oxygen dependent     USES AT NIGHT - 3L/MIN    Pain, joint, ankle and foot     Pancreatic lesion 10/2020    Dr. Mc Calderon working up pt    Peripheral neuropathy     Seizures ([facility]     LAST SEIZURE 3/2020 - FEELS IT WAS FROM ALCOHOL WITHDRAWL    Tension headache     Under care of team 09/29/2021    neuro-Dr Coyle-[facility] ct-last visit sep 2021    Under care of team 09/29/2021    pain management-Hamzah Martines-nery jimenez-last visit sep 2021    Under care of team 09/29/2021    pulmonology-Dr Dueñas-[facility]-due for visit oct 26/2021    Under care of team 09/29/2021    psych-bahnfeldt NP-telemed-last visit 10/ 2021    Under care of team 09/29/2021    kn-[address]-last visit aug 2021    Under care of team     NEPHROLOGY - DR. CHAPMAN     Wears glasses     Wears partial dentures     UPPER    Wellness examination 09/29/2021    [facility] j[facility] 2021     Past Surgical History:   Procedure Laterality Date    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    COLONOSCOPY N/A 10/22/2020    COLONOSCOPY DIAGNOSTIC performed by Polo Teixeira MD at [facility]    COLONOSCOPY N/A 11/19/2021    COLONOSCOPY W/ ENDOSCOPIC MUCOSAL RESECTION performed by Polo Teixeira MD at [facility]  07/28/2021    CT ABSCESS DRAIN SUBCUTANEOUS 7/28/2021 [facility] CT SCAN    ENDOSCOPIC ULTRASOUND (LOWER) N/A 12/09/2020    ENDOSCOPIC ULTRASOUND, UPPER WITH LINEAR SCOPE FOR BIOPSY OF MASS ON HEAD OF PANCREAS performed by Ree Yoder MD at [facility] ERCP  08/11/2021    ERCP N/A 08/11/2021    ERCP STENT INSERTION performed by Eliecer Brady MD at [facility]    ERCP  08/11/2021    ERCP SPHINCTER/PAPILLOTOMY performed by Eliecer Brady MD at [facility]    ERCP  10/21/2021    ERCP STONE REMOVAL    ERCP N/A 10/21/2021    ERCP STENT REMOVAL/EXCHANGE performed by Eliecer Brady MD at [address] [place] [address] ERCP  10/21/2021    ERCP STONE REMOVAL performed by Eliecer Brady MD at [address] [place] [address] ERCP  10/21/2021    ERCP ENDOSCOPIC RETROGRADE CHOLANGIOPANCREATOGRAPHY DIRECT VISUALIZATION performed by Eliecer Brady MD at [facility] Left 07/03/2013    ORIF tibial plateau    FRACTURE SURGERY Right     small finger metacarpal fracture    HAND SURGERY      pins    HYSTERECTOMY  2003    SPINE SURGERY N/A 09/10/2021    KYPHOPLASTY lumbar L5 performed by Marisa Aguilera MD at [facility][note 3][facility] 10/22/2020    EGD BIOPSY performed by Polo Teixeira MD at [address] [place] [address] 04/05/2021    EGD BIOPSY performed by Polo Teixeira MD at [address] [place] [address] N/A 09/08/2021    ENDOSCOPIC ULTRASOUND, LINEAR SCOPE performed by Ree Yoder MD at [facility]     Family History   Problem Relation Age of Onset  Other Father     Cancer Maternal Grandmother     Heart Disease Paternal Grandmother     Esophageal Cancer Maternal Aunt     Arthritis Mother      Social History     Socioeconomic History    Marital status: Single     Spouse name: Not on file    Number of children: Not on file    Years of education: Not on file    Highest education level: Not on file   Occupational History    Not on file   Tobacco Use    Smoking status: Current Every Day Smoker     Packs/day: 1.00     Years: 30.00     Pack years: 30.00     Types: Cigarettes    Smokeless tobacco: Never Used   Vaping Use    Vaping Use: Never used   Substance and Sexual Activity    Alcohol use: Not Currently     Alcohol/week: 0.0 standard drinks    Drug use: Not Currently     Frequency: 2.0 times per week     Comment: 6 months ago    Sexual activity: Not Currently   Other Topics Concern    Not on file   Social History Narrative    Not on file     Social Determinants of Health     Financial Resource Strain: Medium Risk    Difficulty of Paying Living Expenses: Somewhat hard   Food Insecurity: No Food Insecurity    Worried About Running Out of Food in the Last Year: Never true    [place] of Food in the Last Year: Never true   Transportation Needs:     Lack of Transportation (Medical): Not on file    Lack of Transportation (Non-Medical):  Not on file   Physical Activity:     Days of Exercise per Week: Not on file    Minutes of Exercise per Session: Not on file   Stress:     Feeling of Stress : Not on file   Social Connections:     Frequency of Communication with Friends and Family: Not on file    Frequency of Social Gatherings with Friends and Family: Not on file    Attends [employer] Services: Not on file    Active Member of Clubs or Organizations: Not on file    Attends Club or Organization Meetings: Not on file    Marital Status: Not on file   Intimate Partner Violence:     Fear of Current or Ex-Partner: Not on file   [employer] Abused: Not on file    Physically Abused: Not on file    Sexually Abused: Not on file   Housing Stability:     Unable to Pay for Housing in the Last Year: Not on file    Number of Places Lived in the Last Year: Not on file    Unstable Housing in the Last Year: Not on file       Review of Systems  A comprehensive review of systems was negative except for what was noted in the HPI. Physical Exam   Constitutional: She is oriented to person, place, and time. She appears well-developed and well-nourished. No distress. HENT:   Head: Normocephalic and atraumatic. Mouth/Throat: Uvula is midline, oropharynx is clear and moist and mucous membranes are normal.   Eyes: Conjunctivae and EOM are normal. Pupils are equal, round, and reactive to light. Neck: Trachea normal and normal range of motion. Neck supple. No JVD present. Carotid bruit is not present. No mass and no thyromegaly present. Cardiovascular: Normal rate, regular rhythm, normal heart sounds and intact distal pulses. Exam reveals no gallop and no friction rub. No murmur heard. Pulmonary/Chest: Effort normal and breath sounds normal. No respiratory distress. She has no wheezes. She has no rales. Abdominal: Soft. Normal aorta and bowel sounds are normal. She exhibits no distension and no mass. There is no hepatosplenomegaly. No tenderness. Musculoskeletal: She exhibits no edema and no tenderness. Neurological: She is alert and oriented to person, place, and time. She has normal strength. No cranial nerve deficit or sensory deficit. Coordination and gait normal.   Skin: Skin is warm and dry. No rash noted. No erythema. Psychiatric: She has a normal mood and affect. Her behavior is normal.     EKG Interpretation:  normal EKG, normal sinus rhythm, unchanged from previous tracings.     Lab Review   Lab Results   Component Value Date     12/30/2021    K 4.3 12/30/2021     12/30/2021    CO2 21 12/30/2021    BUN 10 12/30/2021 CREATININE 0.46 12/30/2021    GLUCOSE 103 12/30/2021    GLUCOSE 92 04/30/2012    CALCIUM 9.3 12/30/2021              Assessment:       45 y.o. patient with planned surgery as above. Known risk factors for perioperative complications: COPD, Hypertension, seizure disorder   Current medications which may produce withdrawal symptoms if withheld perioperatively: none      Plan:     1. Preoperative workup as follows: none  2. Change in medication regimen before surgery: None  3. Prophylaxis for cardiac events with perioperative beta-blockers: Not indicated  ACC/AHA indications for pre-operative beta-blocker use:    · Vascular surgery with history of postitive stress test  · Intermediate or high risk surgery with history of CAD   · Intermediate or high risk surgery with multiple clinical predictors of CAD- 2 of the following: history of compensated or prior heart failure, history of cerebrovascular disease, DM, or renal insufficiency    Routine administration of higher-dose, long-acting metoprolol in beta-blocker-naïve patients on the day of surgery, and in the absence of dose titration is associated with an overall increase in mortality. Beta-blockers should be started days to weeks prior to surgery and titrated to pulse < 70.  4. Deep vein thrombosis prophylaxis: regimen to be chosen by surgical team  5.  No contraindications to planned surgery

## 2022-01-05 NOTE — Clinical Note
[address]  [address]  [place] [address]  Phone: [phone]  Fax: [phone]    Maria Fernanda Haddad MD        January 5, 2022     Patient: Raji Chatterjee   YOB: 1970   Date of Visit: 1/5/2022       To Whom It May Concern: It is my medical opinion that Calvinfrancisco Smart {Work release (duty restriction):94068}. If you have any questions or concerns, please don't hesitate to call.     Sincerely,        Maria Fernanda Haddad MD

## 2022-01-05 NOTE — LETTER
[address] TORRES  [phone]      2022      RE: Sterling CHEN 1970    Dear Dr. Abbie Davis was evaluated in my office for pre-operative evaluation. Based on review of information available to me at this time, the patient appears to be at no increased risk for the planned procedure. Please see the attached office note for further details regarding measures to minimize perioperative risk. If you have any questions, please feel free to contact me.       Sincerely,        Danielle Aragon MD

## 2022-01-06 NOTE — TELEPHONE ENCOUNTER
Spoke with [facility]. Will send order for conserver testing to test her for smaller tanks.

## 2022-01-09 DIAGNOSIS — F33.1 MODERATE EPISODE OF RECURRENT MAJOR DEPRESSIVE DISORDER (HCC): ICD-10-CM

## 2022-01-09 DIAGNOSIS — F41.1 GAD (GENERALIZED ANXIETY DISORDER): ICD-10-CM

## 2022-01-09 DIAGNOSIS — K31.84 GASTROPARESIS: ICD-10-CM

## 2022-01-10 ENCOUNTER — HOSPITAL ENCOUNTER (OUTPATIENT)
Age: 52
Discharge: HOME | End: 2022-01-10
Payer: MEDICARE

## 2022-01-10 ENCOUNTER — HOSPITAL ENCOUNTER (OUTPATIENT)
Dept: PSYCHIATRY | Age: 52
Setting detail: THERAPIES SERIES
Discharge: HOME | End: 2022-01-10
Payer: MEDICARE

## 2022-01-10 LAB
ANION GAP SERPL CALCULATED.3IONS-SCNC: 17 MMOL/L (ref 9–17)
BUN BLDV-MCNC: 7 MG/DL (ref 6–20)
BUN/CREAT BLD: NORMAL (ref 9–20)
CALCIUM SERPL-MCNC: 9.7 MG/DL (ref 8.6–10.4)
CHLORIDE BLD-SCNC: 101 MMOL/L (ref 98–107)
CO2: 21 MMOL/L (ref 20–31)
CREAT SERPL-MCNC: 0.53 MG/DL (ref 0.5–0.9)
GFR AFRICAN AMERICAN: >60 ML/MIN
GFR NON-AFRICAN AMERICAN: >60 ML/MIN
GFR SERPL CREATININE-BSD FRML MDRD: NORMAL ML/MIN/{1.73_M2}
GFR SERPL CREATININE-BSD FRML MDRD: NORMAL ML/MIN/{1.73_M2}
GLUCOSE BLD-MCNC: 74 MG/DL (ref 70–99)
POTASSIUM SERPL-SCNC: 4.4 MMOL/L (ref 3.7–5.3)
SODIUM BLD-SCNC: 139 MMOL/L (ref 135–144)

## 2022-01-10 PROCEDURE — 36415 COLL VENOUS BLD VENIPUNCTURE: CPT

## 2022-01-10 PROCEDURE — 90837 PSYTX W PT 60 MINUTES: CPT | Performed by: SOCIAL WORKER

## 2022-01-10 PROCEDURE — 80048 BASIC METABOLIC PNL TOTAL CA: CPT

## 2022-01-10 RX ORDER — CARBAMAZEPINE 200 MG/1
TABLET ORAL
Qty: 90 TABLET | Refills: 5 | Status: SHIPPED | OUTPATIENT
Start: 2022-01-10 | End: 2022-07-11

## 2022-01-10 RX ORDER — DULOXETINE 40 MG/1
CAPSULE, DELAYED RELEASE ORAL
Qty: 30 CAPSULE | Refills: 0 | Status: SHIPPED
Start: 2022-01-10 | End: 2022-01-11 | Stop reason: DRUGHIGH

## 2022-01-10 RX ORDER — METOCLOPRAMIDE 5 MG/1
TABLET ORAL
Qty: 90 TABLET | Refills: 3 | Status: SHIPPED | OUTPATIENT
Start: 2022-01-10 | End: 2022-08-08

## 2022-01-10 NOTE — TELEPHONE ENCOUNTER
Last visit: 01/05/2022  Last Med refill: 12/13/2021  Does patient have enough medication for 72 hours: Yes    Next Visit Date:  Future Appointments   Date Time Provider Lexi Castillo   1/10/2022  1:00 PM Darshan Natarajan, Juan Valderrama   1/11/2022  1:30 PM LOI Banks NP PAZ TEL [address]   1/13/2022  3:50 PM Charlie Connell MD AFL Neph [facility] None   1/18/2022  8:15 AM Jessica Meyers, PT [facility]   1/21/2022  1:30 PM Daniel Dawson MD sv gr lks [facility]   2/17/2022  9:45 AM Ismael Mccoy MD Resp Spec [address] [place] [address]   3/17/2022  2:20 PM Des Tyler MD Neuro Spec [facility]   4/5/2022  2:15 PM Ludivina Metzger  [address] Maintenance   Topic Date Due    Breast cancer screen  11/06/2021    Shingles Vaccine (2 of 2) 12/30/2021    Depression Monitoring  02/05/2022    Low dose CT lung screening  05/24/2022    Potassium monitoring  01/05/2023    Creatinine monitoring  01/05/2023    Lipid screen  12/23/2025    DTaP/Tdap/Td vaccine (2 - Td or Tdap) 12/28/2030    Colon cancer screen colonoscopy  11/19/2031    Pneumococcal 0-64 years Vaccine (2 of 2 - PPSV23) 07/05/2034    Flu vaccine  Completed    COVID-19 Vaccine  Completed    Hepatitis C screen  Completed    HIV screen  Completed    Hepatitis A vaccine  Aged Out    Hepatitis B vaccine  Aged Out    Hib vaccine  Aged Out    Meningococcal (ACWY) vaccine  Aged Out       Hemoglobin A1C (%)   Date Value   04/13/2021 5.5   07/14/2019 4.3             ( goal A1C is < 7)   No results found for: LABMICR  LDL Cholesterol (mg/dL)   Date Value   12/23/2020 134 (H)   06/18/2019 92       (goal LDL is <100)   AST (U/L)   Date Value   10/12/2021 14     ALT (U/L)   Date Value   10/12/2021 7     BUN (mg/dL)   Date Value   01/05/2022 5 (L)     BP Readings from Last 3 Encounters:   01/05/22 128/84   12/16/21 131/89   11/19/21 133/89          (goal 120/80)    All Future Testing planned in [facility] Frequency Next Occurrence   Cancer Antigen 19-9 Once 02/12/2021   EGD Once 03/30/2021   SLP videofluoroscopic swallow study Once 07/31/2021   COVID-19 Once 05/19/2021   CT ABDOMEN PELVIS W WO CONTRAST Additional Contrast? Radiologist Recommendation Once 09/10/2021   DEXA VERTEBRAL FRACTURE ASSESSMENT Once 08/31/2021   FACET JOINT L/S SINGLE Once 09/16/2021   EUS Once 09/29/2021   ERCP Once 09/29/2021   TAMMIE Digital Screen Bilateral [[id number]] Once 01/17/2022   COLONOSCOPY (Diagnostic) Once 12/05/2021   OT functional capacity evaluation (FCE) Once 12/14/2021               Patient Active Problem List:     Acute bronchitis     Reflex sympathetic dystrophy of lower limb     Essential hypertension     Nicotine addiction     Psychophysiological insomnia     Anxiety     Alcoholic peripheral neuropathy (HCC)     Hypokalemia     Macrocytic anemia     Hypomagnesemia     Tobacco use     Ambulatory dysfunction     Seizure disorder ([address]     COPD with acute exacerbation (HCC)     Paresthesia of lower extremity     Acute respiratory failure with hypoxia (HCC)     Pancreatic cyst     Recurrent major depressive disorder (HCC)     Elevated CA 19-9 level     Perineal mass, female     Esophagitis candidal      Condyloma     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24, the patient underwent 2 surgical resections without known recurrence     Alcohol use disorder, severe, in early remission ([address]     Metabolic encephalopathy     AMAN (generalized anxiety disorder)     Major depressive disorder, recurrent severe without psychotic features ([address]     Esophageal dysphagia     Chronic peripheral neuropathic pain     History of alcohol abuse     History of petit-mal seizures     Intractable episodic tension-type headache     Personal history of astrocytoma     Multilevel spine pain     Chronic pain syndrome     Marijuana abuse     Severe sepsis (HCC)     Closed fracture of fifth thoracic vertebra (HCC)     Diverticulitis of large intestine with abscess without bleeding     Elevated brain natriuretic peptide (BNP) level     Elevated alkaline phosphatase level     Chronic pancreatitis (HCC)     Erythema ab igne     Compression fracture of thoracic vertebra (HCC)     Pathological compression fracture of lumbar vertebra with delayed healing     Metabolic acidosis with increased anion gap and accumulation of organic acids     Community acquired pneumonia of left lower lobe of lung     Septicemia ([address]     Alcohol-induced acute pancreatitis     Fluid collection of pancreas     Diverticulitis of large intestine without perforation or abscess with bleeding     Pseudocyst of pancreas     Bandemia     Sepsis ([address]     Acute recurrent pancreatitis     Atelectasis of left lung     Hyponatremia     Iron deficiency anemia     Adenomatous polyp of sigmoid colon     Moderate episode of recurrent major depressive disorder ([address]

## 2022-01-10 NOTE — PSYCHOTHERAPY
[facility] Therapy Note in [facility] [address], [address]   1/10/2022  1:00 PM  Lizz Alegreyi  1970  [phone]    Time spent with Patient: 60 minutes      Pt was provided informed consent for the [facility]. Discussed with patient model of service to include the limits of confidentiality (i.e. abuse reporting, suicide intervention, etc.) and short-term intervention focused approach. Pt indicated understanding. S:  Pt presented to therapy as tired and in pain but was engaged in session. Pt and therapist engaged in check in and pt reports that she is getting surgery soon and is excited to get it taken care of but nervous. Pt and therapist processed further her brother-in-law's suicide and discussed her emotions and thoughts. Pt and therapist engaged in problem solving when discussing pt's issues with her pain doctor and processed her frustrations and dislikes. Pt reports that she will consider reaching out to a doctor her friend suggested in [facility] [employer]. Therapist offered pt education on self-care and discussed its importance with pt. Pt and therapist explored what pt already does for self-care and ways to adjust it to be able to be done when she has her \"episodes. \"  Pt identified multiple things she likes to do and other things she will try doing to help her take care of herself and engage in other self-care when she has an episode. 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 Therapist empowered pt and offered praise for taking a step towards something she wanted to do. Therapist encouraged pt to use positive thinking in regards to being accepted.             O:  MSE:     Appearance    alert, cooperative  Appetite normal  Sleep disturbance No  Fatigue Yes  Loss of pleasure No  Impulsive behavior No  Speech    normal rate, normal volume and well articulated  Mood    Normal  Affect    normal affect  Thought Content    intact  Thought Process linear, goal directed and coherent  Associations    logical connections  Insight    Good  Judgment    Intact  Orientation    oriented to person, place, time, and general circumstances  Memory    recent and remote memory intact  Attention/Concentration    intact  Morbid ideation No  Suicide Assessment    no suicidal ideation    A:  Pt presented to therapy with a normal mood and affect. Pt was engaged in session and accepting of suggestions to boost her self-care. Pt is open to exploring and processing her emotions and has taken steps to establish things in her life that make her happy ie. Volunteering a the dog shelter. Pt is also working on getting disability and preparing for that hearing.           Visit Diagnosis:   PTSD      History from Medical Record:        Diagnosis Date    Acute respiratory failure with hypoxia ([address] 10/16/2020    Alcohol withdrawal syndrome, with delirium ([facility] 12/14/2019    Alcoholism ([address]     Anemia 10/2020    GI bleed    Anxiety     Astrocytoma ([facility] - 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24, the patient underwent 2 surgical resections without known recurrence 10/23/2020    at age 24    Closed fracture of lateral portion of left tibial plateau 79/94/7212    COPD (chronic obstructive pulmonary disease) ([facility]     CO2 retainer, on Bipap at night for this, Dr. Estefani Hyde ( last visit 11/20/2020 and note on chart )    Depression     bipolar, major depressive disorder, ptsd, anxiety    Dysphagia     GI bleed 10/2020    Hypertension     Memory loss     Oxygen dependent     USES AT NIGHT - 3L/MIN    Pain, joint, ankle and foot     Pancreatic lesion 10/2020    Dr. Pa Weaver working up pt    Peripheral neuropathy     Seizures ([facility]     LAST SEIZURE 3/2020 - FEELS IT WAS FROM ALCOHOL WITHDRAWL    Tension headache     Under care of team 09/29/2021    neuro-Dr Coyle-[facility] ct-last visit sep 2021    Under care of team 09/29/2021    pain management-Hamzah Martines-nery jimenez-last visit sep 90 capsule 5    amLODIPine (NORVASC) 5 MG tablet TAKE ONE TABLET BY MOUTH DAILY 90 tablet 1    simethicone (MYLICON) 80 MG chewable tablet Take 1 tablet by mouth 4 times daily as needed for Flatulence 180 tablet 3    metoclopramide (REGLAN) 5 MG tablet Take 1 tablet by mouth 3 times daily 120 tablet 2    albuterol sulfate HFA (VENTOLIN HFA) 108 (90 Base) MCG/ACT inhaler Inhale 2 puffs into the lungs 4 times daily as needed for Wheezing 1 Inhaler 5    vitamin D (ERGOCALCIFEROL) 18032 units CAPS capsule Take 1 capsule by mouth once a week 12 capsule 1    folic acid (FOLVITE) 1 MG tablet Take 1 tablet by mouth daily 90 tablet 1     No current facility-administered medications for this encounter. Social History:   Social History     Socioeconomic History    Marital status: Single     Spouse name: Not on file    Number of children: Not on file    Years of education: Not on file    Highest education level: Not on file   Occupational History    Not on file   Tobacco Use    Smoking status: Current Every Day Smoker     Packs/day: 1.00     Years: 30.00     Pack years: 30.00     Types: Cigarettes    Smokeless tobacco: Never Used   Vaping Use    Vaping Use: Never used   Substance and Sexual Activity    Alcohol use: Not Currently     Alcohol/week: 0.0 standard drinks    Drug use: Not Currently     Frequency: 2.0 times per week     Comment: 6 months ago    Sexual activity: Not Currently   Other Topics Concern    Not on file   Social History Narrative    Not on file     Social Determinants of Health     Financial Resource Strain: Medium Risk    Difficulty of Paying Living Expenses: Somewhat hard   Food Insecurity: No Food Insecurity    Worried About Running Out of Food in the Last Year: Never true    Tyler of Food in the Last Year: Never true   Transportation Needs:     Lack of Transportation (Medical): Not on file    Lack of Transportation (Non-Medical):  Not on file   Physical Activity:     Days of Exercise per Week: Not on file    Minutes of Exercise per Session: Not on file   Stress:     Feeling of Stress : Not on file   Social Connections:     Frequency of Communication with Friends and Family: Not on file    Frequency of Social Gatherings with Friends and Family: Not on file    Attends Yarsanism Services: Not on file    Active Member of [address] [place] [address] [employer] or Organizations: Not on file    Attends Club or Organization Meetings: Not on file    Marital Status: Not on file   Intimate Partner Violence:     Fear of Current or Ex-Partner: Not on file    Emotionally Abused: Not on file    Physically Abused: Not on file    Sexually Abused: Not on file   Housing Stability:     Unable to Pay for Housing in the Last Year: Not on file    Number of [place] in the Last Year: Not on file    Unstable Housing in the Last Year: Not on file       TOBACCO:   reports that she has been smoking cigarettes. She has a 30.00 pack-year smoking history. She has never used smokeless tobacco.  ETOH:   reports previous alcohol use. Family History:   Family History   Problem Relation Age of Onset    Other Father     Cancer Maternal Grandmother     Heart Disease Paternal Grandmother     Esophageal Cancer Maternal Aunt     Arthritis Mother            Pt interventions:  Discussed and set plan for behavioral activation, Trained in relaxation strategies, Provided education, Discussed self-care (sleep, nutrition, rewarding activities, social support, exercise), Established rapport, Supportive techniques and Identified maladaptive thoughts        PLAN:   Review self-care  Thought log       Patient scheduled to return on:   1/20/22 @1 PM      Were changes or additions made to the treatment plan today?   YES []   NO []  Noted changes:

## 2022-01-11 ENCOUNTER — TELEMEDICINE (OUTPATIENT)
Dept: PSYCHIATRY | Age: 52
End: 2022-01-11
Payer: MEDICARE

## 2022-01-11 DIAGNOSIS — F10.21 ALCOHOL USE DISORDER, SEVERE, IN EARLY REMISSION (HCC): ICD-10-CM

## 2022-01-11 DIAGNOSIS — F33.1 MODERATE EPISODE OF RECURRENT MAJOR DEPRESSIVE DISORDER (HCC): ICD-10-CM

## 2022-01-11 DIAGNOSIS — F41.1 GAD (GENERALIZED ANXIETY DISORDER): Primary | ICD-10-CM

## 2022-01-11 PROCEDURE — 99214 OFFICE O/P EST MOD 30 MIN: CPT | Performed by: NURSE PRACTITIONER

## 2022-01-11 RX ORDER — MIRTAZAPINE 7.5 MG/1
7.5 TABLET, FILM COATED ORAL NIGHTLY PRN
Qty: 30 TABLET | Refills: 3 | Status: SHIPPED | OUTPATIENT
Start: 2022-01-11 | End: 2022-05-13 | Stop reason: SDUPTHER

## 2022-01-11 RX ORDER — BUSPIRONE HYDROCHLORIDE 15 MG/1
15 TABLET ORAL 3 TIMES DAILY
Qty: 90 TABLET | Refills: 1 | Status: SHIPPED | OUTPATIENT
Start: 2022-01-11 | End: 2022-04-20 | Stop reason: SDUPTHER

## 2022-01-11 RX ORDER — CHLORPROMAZINE HYDROCHLORIDE 10 MG/1
10 TABLET, FILM COATED ORAL 3 TIMES DAILY PRN
Qty: 90 TABLET | Refills: 1 | Status: SHIPPED | OUTPATIENT
Start: 2022-01-11 | End: 2022-02-11 | Stop reason: SDUPTHER

## 2022-01-11 RX ORDER — ESCITALOPRAM OXALATE 10 MG/1
15 TABLET ORAL DAILY
Qty: 45 TABLET | Refills: 1 | Status: SHIPPED | OUTPATIENT
Start: 2022-01-11 | End: 2022-02-11

## 2022-01-11 RX ORDER — PRAZOSIN HYDROCHLORIDE 1 MG/1
1 CAPSULE ORAL NIGHTLY
Qty: 30 CAPSULE | Refills: 3 | Status: SHIPPED | OUTPATIENT
Start: 2022-01-11 | End: 2022-05-04 | Stop reason: SDUPTHER
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
## 2022-01-12 ENCOUNTER — TELEPHONE (OUTPATIENT)
Dept: NEUROLOGY | Age: 52
End: 2022-01-12

## 2022-01-12 NOTE — TELEPHONE ENCOUNTER
Gay Kawasaki called the office and left a message on the clinical VM. She stated that the doctor had increased her Topamax, however there were problems and she had to stop. Gay Kawasaki stated that she increased her Topamax 50 mg to 1 1/2 tablet BID. However she developed redness and itching on both palms starting between her fingers and running all the way down to her wrist.  Patient stated that she could hardly stand it. This happened on Sunday so she called her pharmacist.  Pharmacist told her that this was a reaction and she should go back to her original dose and call the doctor. Gay Kawasaki states that since going back down to Topamax 50 mg BID the symptoms have gone away. Headaches are not as strong but she still wakes up with one every morning. Patient stated that she is willing to try the increase again if you really want her to, however she is not looking forward to that. Please advise.

## 2022-01-14 NOTE — TELEPHONE ENCOUNTER
Please let the patient know that I wouldn't recommend increasing the dose any further as she had possible intolerance at higher dose. I am happy to hear that headaches are not as strong.    Thank you.   -dr. Carlita Meredith

## 2022-01-17 NOTE — TELEPHONE ENCOUNTER
Call placed to the patient and this information was given. Patient verbally stated her understanding.

## 2022-01-18 ENCOUNTER — HOSPITAL ENCOUNTER (OUTPATIENT)
Dept: PHYSICAL THERAPY | Facility: CLINIC | Age: 52
Setting detail: THERAPIES SERIES
Discharge: HOME | End: 2022-01-18
Payer: MEDICARE

## 2022-01-18 DIAGNOSIS — E87.1 HYPONATREMIA: ICD-10-CM

## 2022-01-18 PROCEDURE — 97750 PHYSICAL PERFORMANCE TEST: CPT

## 2022-01-19 NOTE — TELEPHONE ENCOUNTER
Last visit: 01/05/2022  Last Med refill: 12/21/2021  Does patient have enough medication for 72 hours: Yes    THIS MED WAS ORDERED BY DR Tala Rae    Next Visit Date:  Future Appointments   Date Time Provider Lexi Rodriguezi   1/20/2022 12:30 PM MARVIN Lawler [facility]   1/21/2022  1:30 PM Bandar Day MD sv gr lks [email or web address]   2/8/2022 12:30 PM LOI Chan - NP [facility] [id number]   2/24/2022  2:50 PM Panchito Feng MD [facility] None   3/17/2022  2:20 PM Lucy Mcgrath MD Neuro Spec [email or web address]   3/22/2022  1:30 PM Alex Manning MD Resp Spec [facility]   4/5/2022  2:15 PM Ludivina Hayden  [address] Maintenance   Topic Date Due    Breast cancer screen  11/06/2021    Shingles Vaccine (2 of 2) 12/30/2021    Depression Monitoring  02/05/2022    Low dose CT lung screening  05/24/2022    Potassium monitoring  01/10/2023    Creatinine monitoring  01/10/2023    Lipid screen  12/23/2025    DTaP/Tdap/Td vaccine (2 - Td or Tdap) 12/28/2030    Colon cancer screen colonoscopy  11/19/2031    Pneumococcal 0-64 years Vaccine (2 of 2 - PPSV23) 07/05/2034    Flu vaccine  Completed    COVID-19 Vaccine  Completed    Hepatitis C screen  Completed    HIV screen  Completed    Hepatitis A vaccine  Aged Out    Hepatitis B vaccine  Aged Out    Hib vaccine  Aged Out    Meningococcal (ACWY) vaccine  Aged Out       Hemoglobin A1C (%)   Date Value   04/13/2021 5.5   07/14/2019 4.3             ( goal A1C is < 7)   No results found for: LABMICR  LDL Cholesterol (mg/dL)   Date Value   12/23/2020 134 (H)   06/18/2019 92       (goal LDL is <100)   AST (U/L)   Date Value   10/12/2021 14     ALT (U/L)   Date Value   10/12/2021 7     BUN (mg/dL)   Date Value   01/10/2022 7     BP Readings from Last 3 Encounters:   01/05/22 128/84   12/16/21 131/89   11/19/21 133/89          (goal 120/80)    All Future Testing planned in [facility]  Lab Frequency Next Occurrence Cancer Antigen 19-9 Once 02/12/2021   EGD Once 03/30/2021   SLP videofluoroscopic swallow study Once 07/31/2021   COVID-19 Once 05/19/2021   CT ABDOMEN PELVIS W WO CONTRAST Additional Contrast? Radiologist Recommendation Once 09/10/2021   DEXA VERTEBRAL FRACTURE ASSESSMENT Once 08/31/2021   FACET JOINT L/S SINGLE Once 09/16/2021   EUS Once 09/29/2021   ERCP Once 09/29/2021   TAMMIE Digital Screen Bilateral [[id number]] Once 01/17/2022   COLONOSCOPY (Diagnostic) Once 12/05/2021   OT functional capacity evaluation (FCE) Once 12/14/2021               Patient Active Problem List:     Acute bronchitis     Reflex sympathetic dystrophy of lower limb     Essential hypertension     Nicotine addiction     Psychophysiological insomnia     Anxiety     Alcoholic peripheral neuropathy (HCC)     Hypokalemia     Macrocytic anemia     Hypomagnesemia     Tobacco use     Ambulatory dysfunction     Seizure disorder ([address]     COPD with acute exacerbation (HCC)     Paresthesia of lower extremity     Acute respiratory failure with hypoxia (HCC)     Pancreatic cyst     Recurrent major depressive disorder (HCC)     Elevated CA 19-9 level     Perineal mass, female     Esophagitis candidal      Condyloma     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24, the patient underwent 2 surgical resections without known recurrence     Alcohol use disorder, severe, in early remission ([address]     Metabolic encephalopathy     AMAN (generalized anxiety disorder)     Major depressive disorder, recurrent severe without psychotic features ([address]     Esophageal dysphagia     Chronic peripheral neuropathic pain     History of alcohol abuse     History of petit-mal seizures     Intractable episodic tension-type headache     Personal history of astrocytoma     Multilevel spine pain     Chronic pain syndrome     Marijuana abuse     Severe sepsis (HCC)     Closed fracture of fifth thoracic vertebra (HCC)     Diverticulitis of large intestine with abscess without bleeding Elevated brain natriuretic peptide (BNP) level     Elevated alkaline phosphatase level     Chronic pancreatitis (HCC)     Erythema ab igne     Compression fracture of thoracic vertebra (HCC)     Pathological compression fracture of lumbar vertebra with delayed healing     Metabolic acidosis with increased anion gap and accumulation of organic acids     Community acquired pneumonia of left lower lobe of lung     Septicemia ([address]     Alcohol-induced acute pancreatitis     Fluid collection of pancreas     Diverticulitis of large intestine without perforation or abscess with bleeding     Pseudocyst of pancreas     Bandemia     Sepsis ([address]     Acute recurrent pancreatitis     Atelectasis of left lung     Hyponatremia     Iron deficiency anemia     Adenomatous polyp of sigmoid colon     Moderate episode of recurrent major depressive disorder ([address]

## 2022-01-20 ENCOUNTER — HOSPITAL ENCOUNTER (OUTPATIENT)
Dept: PSYCHIATRY | Age: 52
Setting detail: THERAPIES SERIES
Discharge: HOME | End: 2022-01-20
Payer: MEDICARE

## 2022-01-20 PROCEDURE — 90837 PSYTX W PT 60 MINUTES: CPT | Performed by: SOCIAL WORKER

## 2022-01-20 RX ORDER — SODIUM CHLORIDE 1000 MG
TABLET, SOLUBLE MISCELLANEOUS
Qty: 90 TABLET | Refills: 11 | Status: SHIPPED | OUTPATIENT
Start: 2022-01-20 | End: 2022-04-04 | Stop reason: SDUPTHER

## 2022-01-21 ENCOUNTER — OFFICE VISIT (OUTPATIENT)
Dept: GASTROENTEROLOGY | Age: 52
End: 2022-01-21
Payer: MEDICARE

## 2022-01-21 VITALS — WEIGHT: 140 LBS | BODY MASS INDEX: 23.3 KG/M2 | SYSTOLIC BLOOD PRESSURE: 113 MMHG | DIASTOLIC BLOOD PRESSURE: 77 MMHG

## 2022-01-21 DIAGNOSIS — K21.9 GASTROESOPHAGEAL REFLUX DISEASE WITHOUT ESOPHAGITIS: ICD-10-CM

## 2022-01-21 DIAGNOSIS — R13.10 DYSPHAGIA, UNSPECIFIED TYPE: ICD-10-CM

## 2022-01-21 DIAGNOSIS — K31.84 GASTROPARESIS: Primary | ICD-10-CM

## 2022-01-21 PROCEDURE — 3017F COLORECTAL CA SCREEN DOC REV: CPT | Performed by: INTERNAL MEDICINE

## 2022-01-21 PROCEDURE — 4004F PT TOBACCO SCREEN RCVD TLK: CPT | Performed by: INTERNAL MEDICINE

## 2022-01-21 PROCEDURE — G8482 FLU IMMUNIZE ORDER/ADMIN: HCPCS | Performed by: INTERNAL MEDICINE

## 2022-01-21 PROCEDURE — 99213 OFFICE O/P EST LOW 20 MIN: CPT | Performed by: INTERNAL MEDICINE

## 2022-01-21 PROCEDURE — G8427 DOCREV CUR MEDS BY ELIG CLIN: HCPCS | Performed by: INTERNAL MEDICINE

## 2022-01-21 PROCEDURE — G8420 CALC BMI NORM PARAMETERS: HCPCS | Performed by: INTERNAL MEDICINE

## 2022-01-21 RX ORDER — SUCRALFATE 1 G/1
1 TABLET ORAL 4 TIMES DAILY
Qty: 120 TABLET | Refills: 3 | Status: SHIPPED | OUTPATIENT
Start: 2022-01-21 | End: 2022-05-14

## 2022-01-21 ASSESSMENT — ENCOUNTER SYMPTOMS
VOMITING: 0
ALLERGIC/IMMUNOLOGIC NEGATIVE: 1
ABDOMINAL PAIN: 1
CONSTIPATION: 1
TROUBLE SWALLOWING: 1
EYES NEGATIVE: 1
CHOKING: 1
ABDOMINAL DISTENTION: 1
RECTAL PAIN: 0
BLOOD IN STOOL: 0

## 2022-01-21 NOTE — PROGRESS NOTES
GI FOLLOW UP    INTERVAL HISTORY:       Status post colonoscopy with EMR of sessile polyp in the left-sided colon  Denies any significant GI symptoms  Pending evaluation by colorectal surgery for condyloma resection    Chief Complaint   Patient presents with    Follow-up     EMR follow up    Gastroesophageal Reflux     Patient taking prilosec 40mg BID. States having heartburn almost daily. Patient does not fully avoid triggers.  Other     Patient states taking Reglan 3 times a day. Still feeling very full. Only eats about 2 times a day.  Constipation     Patient states having a bowel movement every 2-3 days. Denies bleeding.  Dysphagia     Patient states having troubles swallowing liquids several times a day. 1. Gastroparesis    2. Gastroesophageal reflux disease without esophagitis    3.  Dysphagia, unspecified type          HISTORY OF PRESENT ILLNESS: Ms.Heather TIANNA White is a 45 y.o. female with a past history remarkable for hypertension, depression, COPD, alcoholism with dependency and recent mission for withdrawal symptoms, additionally admitted for intoxication COPD exacerbation identified to have anemia with fecal occult positive results, underwent EGD and colonoscopy (poor prep) that revealed no obvious upper GI sources of the patient's anemia however did reveal severe condyloma involving the anorectal region and general region.  Patient was referred to colorectal surgery and is undergoing evaluation to have her severe condyloma acuminata removed and treated for.     Patient will need a repeat colonoscopy with extended bowel prep when she is treated for condyloma soon as possible as she has a residual flat polyps identified on her index colonoscopy that need to be resected.     Is unlikely the patient is unable to tolerate bowel prep given that she has endoscopic evidence of severe gastroparesis. Nilsa Eric most recent upper endoscopy revealed significant to moderate amount of residual gastric food undigested suggestive of gastroparesis     Repeat upper endoscopy was performed abnormal esophagram which revealed a possible intraluminal narrowing in the midesophagus possible related to aortic arch compression.  EGD revealed possible extraluminal narrowing in the midesophagus but no intraluminal lesions that were obvious.  Stomach again was filled with partially digested food suggestive of gastroparesis versus nonadherence to dietary instructions versus medication induced poor contractility     Currently the patient reports a difficulty initiating swallowing in the oropharyngeal region.  Does not report any globus sensation but does not report any esophageal symptoms     CT of the chest was negative for any extraluminal compression of the midesophagus  Swallowing study was unremarkable  Emptying test identified evidence of gastroparesis        PD stricture likely in the setting of chronic pancreatitis status post PD stent placement on August 11, 2021     Status post ERCP on October 21 with pancreatic duct cyst stone removal and stent placement    Past Medical,Family, and Social History reviewed and does contribute to the patient presenting condition. Patient's PMH/PSH,SH,PSYCH Hx, MEDs, ALLERGIES, and ROS were all reviewed and updated in the appropriate sections.     PAST MEDICAL HISTORY:  Past Medical History:   Diagnosis Date    Acute respiratory failure with hypoxia ([address] 10/16/2020    Alcohol withdrawal syndrome, with delirium ([address] 12/14/2019    Alcoholism ([address]     Anal warts     Anemia 10/2020    GI bleed    Anxiety     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24, the patient underwent 2 surgical resections without known recurrence 10/23/2020    at age 24    Closed fracture of lateral portion of left tibial plateau 26/52/8807    COPD (chronic obstructive pulmonary disease) ([address] CO2 retainer, on Bipap at night for this, Dr. Perez Feeling ( last visit 11/20/2020 and note on chart )    Depression      major depressive disorder, ptsd, anxiety    Dysphagia     GI bleed 10/2020    Hypertension     Memory loss     Oxygen dependent     USES AT NIGHT - 3L/MIN    Pain, joint, ankle and foot     Pancreatic lesion 10/2020    Dr. Austin Peraza working up pt    Perianal wart     Peripheral neuropathy     Seizures ([address]     LAST SEIZURE 3/2020 - FEELS IT WAS FROM ALCOHOL WITHDRAWL    Tension headache     Under care of team 09/29/2021    neuro-Dr Coyle-[facility]-last visit sep 2021    Under care of team 09/29/2021    pain management-Hamzah Martines-[facility]-last visit sep 2021    Under care of team 09/29/2021    pulmonology-Dr [facility]as[facility]-due for visit oct 26/2021    Under care of team 09/29/2021    psych-bahnfeldt NP-telemed-last visit 10/ 2021    Under care of team 09/29/2021    xo-[facility]-last visit aug 2021    Under care of team     NEPHROLOGY - DR. CHAPMAN     Wears glasses     Wears partial dentures     UPPER    Wellness examination 09/29/2021    pcp-Ludivina Grimm-[address]-last visit 2021       Past Surgical History:   Procedure Laterality Date    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    COLONOSCOPY N/A 10/22/2020    COLONOSCOPY DIAGNOSTIC performed by Hector Castro MD at  [place] [address] COLONOSCOPY N/A 11/19/2021    COLONOSCOPY W/ ENDOSCOPIC MUCOSAL RESECTION performed by Hector Castro MD at [address]  07/28/2021    CT ABSCESS DRAIN SUBCUTANEOUS 7/28/2021 [facility] CT SCAN    ENDOSCOPIC ULTRASOUND (LOWER) N/A 12/09/2020    ENDOSCOPIC ULTRASOUND, UPPER WITH LINEAR SCOPE FOR BIOPSY OF MASS ON HEAD OF PANCREAS performed by Be Hoyos MD at [address] [facility] ERCP  08/11/2021    ERCP N/A 08/11/2021    ERCP STENT INSERTION performed by Mamta Ernandez MD at [facility]    ERCP  08/11/2021    ERCP SPHINCTER/PAPILLOTOMY performed by Laura Campa MD at [facility]    ERCP  10/21/2021    ERCP STONE REMOVAL    ERCP N/A 10/21/2021    ERCP STENT REMOVAL/EXCHANGE performed by Laura Campa MD at [address] ERCP  10/21/2021    ERCP STONE REMOVAL performed by Laura Campa MD at [address] ERCP  10/21/2021    ERCP ENDOSCOPIC RETROGRADE CHOLANGIOPANCREATOGRAPHY DIRECT VISUALIZATION performed by aLura Campa MD at [address] 07/03/2013    ORIF tibial plateau    FRACTURE SURGERY Right     small finger metacarpal fracture    HAND SURGERY      HYSTERECTOMY  2003    KYPHOSIS SURGERY      SPINE SURGERY N/A 09/10/2021    KYPHOPLASTY lumbar L5 performed by Ankush Raza MD at [facility] 10/22/2020    EGD BIOPSY performed by Jeanmarie Mckenna MD at [address] [place] [address] 04/05/2021    EGD BIOPSY performed by Jeanmarie Mckenna MD at [address] [place] [address] N/A 09/08/2021    ENDOSCOPIC ULTRASOUND, LINEAR SCOPE performed by Suly Dallas MD at [address] [place] [address] Street:    Current Outpatient Medications:     sodium chloride 1 g tablet, TAKE ONE TABLET BY MOUTH THREE TIMES A DAY (Patient taking differently: Take 1 g by mouth 4 times daily ), Disp: 90 tablet, Rfl: 11    escitalopram (LEXAPRO) 10 MG tablet, Take 1.5 tablets by mouth daily, Disp: 45 tablet, Rfl: 1    chlorproMAZINE (THORAZINE) 10 MG tablet, Take 1 tablet by mouth 3 times daily as needed (anxiety), Disp: 90 tablet, Rfl: 1    busPIRone (BUSPAR) 15 MG tablet, Take 15 mg by mouth three times daily, Disp: 90 tablet, Rfl: 1    prazosin (MINIPRESS) 1 MG capsule, Take 1 capsule by mouth nightly, Disp: 30 capsule, Rfl: 3    mirtazapine (REMERON) 7.5 MG tablet, Take 1 tablet by mouth nightly as needed (sleep), Disp: 30 tablet, Rfl: 3    carBAMazepine (TEGRETOL) 200 MG tablet, TAKE ONE TABLET BY MOUTH THREE TIMES A DAY, Disp: 90 tablet, Rfl: 5    metoclopramide (REGLAN) 5 MG tablet, TAKE ONE TABLET BY MOUTH THREE TIMES A DAY, Disp: 90 tablet, Rfl: 3    rOPINIRole (REQUIP) 1 MG tablet, TAKE ONE TABLET BY MOUTH ONCE NIGHTLY, Disp: 90 tablet, Rfl: 1    topiramate (TOPAMAX) 50 MG tablet, Take 1.5 tab bid for 2 wk, then two tab bid (Patient taking differently: Take 50 mg by mouth 3 times daily Take 1.5 tab bid for 2 wk, then two tab bid), Disp: 120 tablet, Rfl: 3    CREON 99627-59767 units delayed release capsule, TAKE ONE CAPSULE BY MOUTH THREE TIMES A DAY WITH MEALS, Disp: 90 capsule, Rfl: 1    omeprazole (PRILOSEC) 40 MG delayed release capsule, TAKE ONE CAPSULE BY MOUTH TWICE A DAY, Disp: 60 capsule, Rfl: 2    fluticasone-umeclidin-vilant (TRELEGY ELLIPTA) 100-62.5-25 MCG/INH AEPB, Inhale 1 puff into the lungs daily, Disp: 1 each, Rfl: 5    denosumab (PROLIA) 60 MG/ML SOSY SC injection, Inject 1 mL into the skin every 6 months, Disp: 1 mL, Rfl: 1    pregabalin (LYRICA) 200 MG capsule, Take 1 capsule by mouth 3 times daily for 180 days. , Disp: 90 capsule, Rfl: 5    amLODIPine (NORVASC) 5 MG tablet, TAKE ONE TABLET BY MOUTH DAILY, Disp: 90 tablet, Rfl: 1    simethicone (MYLICON) 80 MG chewable tablet, Take 1 tablet by mouth 4 times daily as needed for Flatulence, Disp: 180 tablet, Rfl: 3    albuterol sulfate HFA (VENTOLIN HFA) 108 (90 Base) MCG/ACT inhaler, Inhale 2 puffs into the lungs 4 times daily as needed for Wheezing, Disp: 1 Inhaler, Rfl: 5    vitamin D (ERGOCALCIFEROL) 08149 units CAPS capsule, Take 1 capsule by mouth once a week, Disp: 12 capsule, Rfl: 1    folic acid (FOLVITE) 1 MG tablet, Take 1 tablet by mouth daily, Disp: 90 tablet, Rfl: 1    ALLERGIES:   No Known Allergies    FAMILY HISTORY:       Problem Relation Age of Onset    Other Father     Cancer Maternal Grandmother     Heart Disease Paternal Grandmother     Esophageal Cancer Maternal Aunt     Arthritis Mother          SOCIAL HISTORY:   Social History Number of Places Lived in the Last Year: Not on file    Unstable Housing in the Last Year: Not on file       REVIEW OF SYSTEMS: A 12-point review of systems was obtained and pertinent positives and negatives were listed below. REVIEW OF SYSTEMS:     Constitutional: No fever, no chills, no lethargy, no weakness. HEENT:  No headache, otalgia, itchy eyes, nasal discharge or sore throat. Cardiac:  No chest pain, dyspnea, orthopnea or PND. Chest:   No cough, phlegm or wheezing. Abdomen:      Detailed by MA   Neuro:  No focal weakness, abnormal movements or seizure like activity. Skin:   No rashes, no itching. :   No hematuria, no pyuria, no dysuria, no flank pain. Extremities:  No swelling or joint pains. ROS was otherwise negative    Review of Systems   Constitutional: Positive for fatigue. Negative for unexpected weight change. HENT: Positive for trouble swallowing (saliva). Eyes: Negative. Respiratory: Positive for choking. Cardiovascular: Negative. Gastrointestinal: Positive for abdominal distention, abdominal pain and constipation. Negative for blood in stool, rectal pain and vomiting. Anal bleeding: hemorrhoids. Endocrine: Negative. Genitourinary: Negative. Musculoskeletal: Positive for gait problem. Skin: Negative. Allergic/Immunologic: Negative. Neurological: Positive for dizziness, light-headedness and headaches. Hematological: Negative. Psychiatric/Behavioral: Positive for sleep disturbance. All other systems reviewed and are negative. PHYSICAL EXAMINATION: Vital signs reviewed per the nursing documentation. /77   Wt 140 lb (63.5 kg)   BMI 23.30 kg/m²   Body mass index is 23.3 kg/m². Physical Exam    Physical Exam   Constitutional: Patient is oriented to person, place, and time. Patient appears well-developed and well-nourished. HENT:   Head: Normocephalic and atraumatic. Eyes: Pupils are equal, round, and reactive to light.  EOM are normal.   Neck: Normal range of motion. Neck supple. No JVD present. No tracheal deviation present. No thyromegaly present. Cardiovascular: Normal rate, regular rhythm, normal heart sounds and intact distal pulses. Pulmonary/Chest: Effort normal and breath sounds normal. No stridor. No respiratory distress. He has no wheezes. He has no rales. He exhibits no tenderness. Abdominal: Soft. Bowel sounds are normal. He exhibits no distension and no mass. There is no tenderness. There is no rebound and no guarding. No hernia. Musculoskeletal: Normal range of motion. Lymphadenopathy:    Patient has no cervical adenopathy. Neurological: Patient is alert and oriented to person, place, and time. Psychiatric: Patient has a normal mood and affect.  Patient behavior is normal.       LABORATORY DATA: Reviewed  Lab Results   Component Value Date    WBC 10.4 11/09/2021    HGB 12.7 11/09/2021    HCT 40.2 11/09/2021    MCV 90.3 11/09/2021     (H) 11/09/2021     01/10/2022    K 4.4 01/10/2022     01/10/2022    CO2 21 01/10/2022    BUN 7 01/10/2022    CREATININE 0.53 01/10/2022    LABALBU 3.8 10/12/2021    BILITOT 0.19 (L) 10/12/2021    ALKPHOS 211 (H) 10/12/2021    AST 14 10/12/2021    ALT 7 10/12/2021    INR 1.0 08/09/2021         Lab Results   Component Value Date    RBC 4.45 11/09/2021    HGB 12.7 11/09/2021    MCV 90.3 11/09/2021    MCH 28.5 11/09/2021    MCHC 31.6 11/09/2021    RDW 13.8 11/09/2021    MPV 9.6 11/09/2021    BASOPCT 0 11/09/2021    LYMPHSABS 2.76 11/09/2021    MONOSABS 0.83 11/09/2021    NEUTROABS 6.68 11/09/2021    EOSABS 0.06 11/09/2021    BASOSABS 0.03 11/09/2021         DIAGNOSTIC TESTING:     MRI BRAIN W WO CONTRAST    Result Date: 12/31/2021  EXAMINATION: MRI OF THE BRAIN WITHOUT AND WITH CONTRAST  12/31/2021 3:36 pm TECHNIQUE: Multiplanar multisequence MRI of the head/brain was performed without and with the administration of intravenous contrast. COMPARISON: CT brain performed 07/25/2021. HISTORY: ORDERING SYSTEM PROVIDED HISTORY: Nystagmus TECHNOLOGIST PROVIDED HISTORY: New onset persistent horizontal nystagmus; unprovoked fatigue; white matter abnormality on prior brain MRI; ?  Demyelinating disease Is the patient pregnant?->No Reason for Exam: New onset persistent horizontal nystagmus; unprovoked fatigue; white matter abnormality on prior brain MRI; ?  Demyelinating disease, Nystagmus, Other fatigue, White matter abnormality on MRI of brain FINDINGS: INTRACRANIAL STRUCTURES/VENTRICLES:  The sellar and suprasellar structures, optic chiasm, corpus callosum, pineal gland, tectum, and midline brainstem structures are unremarkable. The craniocervical junction is unremarkable. There is no acute hemorrhage, mass effect, or midline shift. There is satisfactory overall gray-white matter differentiation. There is chronic microvascular disease. There is an area of remote parenchymal change in the right parietal lobe due to prior shunt tubing. The ventricular structures are symmetric and unremarkable. The infratentorial structures including the cerebellopontine angles and internal auditory canals are unremarkable. There is no abnormal restricted diffusion. There is no abnormal blooming artifact on susceptibility weighted imaging. There is no abnormal postcontrast enhancement. ORBITS: The visualized portion of the orbits demonstrate no acute abnormality. SINUSES: The visualized paranasal sinuses and mastoid air cells demonstrate no acute abnormality. BONES/SOFT TISSUES: The bone marrow signal intensity appears normal. The soft tissues demonstrate no acute abnormality. No acute intracranial abnormality. No abnormal postcontrast enhancement.  RECOMMENDATIONS: Unavailable              IMPRESSION: Ms.Heather TIANNA White is a 45 y.o. female with a past history remarkable for hypertension, depression, COPD, alcoholism with dependency and recent mission for withdrawal symptoms, additionally admitted for intoxication COPD exacerbation identified to have anemia with fecal occult positive results, underwent EGD and colonoscopy (poor prep) that revealed no obvious upper GI sources of the patient's anemia however did reveal severe condyloma involving the anorectal region and general region.  Patient was referred to colorectal surgery and is undergoing evaluation to have her severe condyloma acuminata removed and treated for.     Patient will need a repeat colonoscopy with extended bowel prep when she is treated for condyloma soon as possible as she has a residual flat polyps identified on her index colonoscopy that need to be resected.     Is unlikely the patient is unable to tolerate bowel prep given that she has endoscopic evidence of severe gastroparesis. [place] most recent upper endoscopy revealed significant to moderate amount of residual gastric food undigested suggestive of gastroparesis     Repeat upper endoscopy was performed abnormal esophagram which revealed a possible intraluminal narrowing in the midesophagus possible related to aortic arch compression.  EGD revealed possible extraluminal narrowing in the midesophagus but no intraluminal lesions that were obvious.  Stomach again was filled with partially digested food suggestive of gastroparesis versus nonadherence to dietary instructions versus medication induced poor contractility     Currently the patient reports a difficulty initiating swallowing in the oropharyngeal region.  Does not report any globus sensation but does not report any esophageal symptoms     CT of the chest was negative for any extraluminal compression of the midesophagus  Swallowing study was unremarkable  Emptying test identified evidence of gastroparesis     Interval history:  Recent complicated hospital course identified by a possible peripancreatic fluid collection in the setting of either pancreatic duct leak versus acute on chronic pancreatitis related to chronic pancreatitis and PD stricturing  Status post ERCP with pancreatic duct stent-5 [place] 9 cm single pigtail  Underwent percutaneous drain peripancreatic fluid with recent removal  Clinically doing well from GI standpoint with mild unintentional weight loss  Repeat ERCP with attempted spyglass and pancreatic duct stone removal with PD stent placement on 10/21/2021  Patient reports some dysphagia and reflux symptoms    Assessment  1. Gastroparesis    2. Gastroesophageal reflux disease without esophagitis    3. Dysphagia, unspecified type        PLAN:    1) anorectal genital condyloma-follow up with CR surgery. Likely plan for surgical resection    2) repeat colonoscopy for post EMR surveillance recommended in 4 months. Currently denies any lower GI symptoms    3) chronic pancreatitis with pancreatic duct calcified stone status post removal with ERCP placement of PD stent-plan on repeat ERCP plus minus EUS with stent removal.  Stent for diagnostic EGD given patient's intermittent slow progressive dysphagia symptoms. Possible esophagitis. We will add Carafate 1 g 4 times daily. Strongly by smoking cessation. Patient continue with Prilosec 40 mg twice daily. Thank you for allowing me to participate in the care of Ms. Anastasia Darden. For any further questions please do not hesitate to contact me. I have reviewed and agree with the ROS entered by the MA/LPN from today's encounter documented in a separate note. Ulysses Dumont MD, MPH   [facility]  Office #: [phone]    this note is created with the assistance of a speech recognition program.  While intending to generate a document that actually reflects the content of the visit, the document can still have some errors including those of syntax and sound a like substitutions which may escape proof reading. It such instances, actual meaning can be extrapolated by contextual diversion.

## 2022-01-21 NOTE — PSYCHOTHERAPY
[facility] Therapy Note in [facility] [address], [address]   1/20/22  12:30 PM  Steve Gramajo  1970  [id number]    Time spent with Patient: 60 minutes      Pt was provided informed consent for the [facility]. Discussed with patient model of service to include the limits of confidentiality (i.e. abuse reporting, suicide intervention, etc.) and short-term intervention focused approach. Pt indicated understanding. S:  Pt presented to therapy as anxious and engaged in session. Pt had identified topic that she wanted to process and discuss. Pt reports a difficult appointment with her psych in which she experienced a \"panic attack\" over her upcoming stressors and \"everything piling on. \"  Pt and therapist processed what her \"panic attacks\" looked like which included crying, tight throat, difficulty breathing, clenched toes/fingers, and a shaking fist.  Pt reports these \"attacks\" come from stress and triggers and are not out of nowhere. Pt and therapist also discussed which of pt's warning signs come first including crying, difficulty breathing, and clenched hands. Pt also recognizes when her thoughts start to race. Pt and therapist practiced breathing and mindfulness in session and therapist encouraged pt to utilize these when she notices her anxiety starts before it \"gets out of control. \"  Therapist also offered psychoeducation on the importance of practicing breathing and mindfulness when calm to make it a daily practice. Therapist and pt also processed pt's psych's response to her questions in which pt felt belittled. Pt requested info about a new psych. Pt and therapist also engaged in CBT techniques to explore thoughts and beliefs behind anxiety. Therapist acknowledged pt's ability to redirect herself when she feels she is going off track. Pt and therapist discussed what pt wanted out of \"feeling better\" and discussed them on at a time to help pt stay focused.   Pt and therapist problem solved pt's desire to [facility] better control from A to Z. \"  Pt and therapist processed pt's desire to keep up with dishes and be \"less confused. \"  Pt reports some of her medication can cause confusion. Therapist also offered psychoeducation on Bipolar Disorder, trauma, and ADHD. Therapist gave pt homework to practice breathing or mindfulness each day and pack away some of her dishes. O:  MSE:     Appearance    alert, cooperative, crying, mild distress  Appetite normal  Sleep disturbance No  Fatigue Yes  Loss of pleasure No  Impulsive behavior No  Speech    spontaneous and rapid  Mood    Anxious  Affect    anxiety  Thought Content    intrusive thoughts and cognitive distortions  Thought Process    rapid and overabundance of ideas  Associations    logical connections  Insight    Good  Judgment    Intact  Orientation    oriented to person, place, time, and general circumstances  Memory    Some memory impairment with thoughts   Attention/Concentration    intact  Morbid ideation No  Suicide Assessment    no suicidal ideation    A:  Pt presented to session as anxious and her anxiety caro and fell throughout session. Breathing techniques and mindfulness helped ground pt during session when therapist led pt in them. Pt struggled to make coherent statements when anxious and had a lot of thoughts running through her head. Pt was able to redirect herself when she noticed she was changing the subject or going off on a tangent. Pt was also able to come up with solutions to identified problems including memory and her dishes. Pt was able to process some of her thoughts to reduce anxiety. Therapist to assess pt for an additional dx of anxiety. Pt's \"panic attacks\" are not indicative of Panic Disorder as they are brought on by triggers and not out of nowhere.           Visit Diagnosis:   Post traumatic Stress disorder  R/O Generalized Anxiety Disorder       History from Medical Record: Diagnosis Date    [facility] 10/16/2020    Alcohol withdrawal syndrome, with delirium ([facility] 12/14/2019    Alcoholism ([facility]     Anemia 10/2020    GI bleed    Anxiety     Astrocytoma ([facility] - 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24, the patient underwent 2 surgical resections without known recurrence 10/23/2020    at age 24    Closed fracture of lateral portion of left tibial plateau 18/75/1502    COPD (chronic obstructive pulmonary disease) ([facility]     CO2 retainer, on Bipap at night for this, Dr. Evan Reyes ( last visit 11/20/2020 and note on chart )    Depression     bipolar, major depressive disorder, ptsd, anxiety    Dysphagia     GI bleed 10/2020    Hypertension     Memory loss     Oxygen dependent     USES AT NIGHT - 3L/MIN    Pain, joint, ankle and foot     Pancreatic lesion 10/2020    Dr. Mercy Saucedo working up pt    Peripheral neuropathy     Seizures ([facility]     LAST SEIZURE 3/2020 - FEELS IT WAS FROM ALCOHOL WITHDRAWL    Tension headache     Under care of team 09/29/2021    neuro-Dr Coyle-[facility]-last visit sep 2021    Under care of team 09/29/2021    pain management-Hamzah Martines-[facility]-last visit sep 2021    Under care of team 09/29/2021    pulmonology-Dr Dueñas-[facility]-due for visit oct 26/2021    Under care of team 09/29/2021    psych-moniquet NP-telemed-last visit 10/ 2021    Under care of team 09/29/2021    [facility]-last visit aug 2021    Under care of team     NEPHROLOGY - DR. CHAPMAN     Wears glasses     Wears partial dentures     UPPER    Wellness examination 09/29/2021    pcp-Ludiv[address]-last visit june 2021     Medications:   Current Outpatient Medications   Medication Sig Dispense Refill    sodium chloride 1 g tablet TAKE ONE TABLET BY MOUTH THREE TIMES A DAY 90 tablet 11    escitalopram (LEXAPRO) 10 MG tablet Take 1.5 tablets by mouth daily 45 tablet 1    chlorproMAZINE (THORAZINE) 10 MG tablet Take 1 tablet by mouth 3 times daily as needed (anxiety) 90 tablet 1    busPIRone (BUSPAR) 15 MG tablet Take 15 mg by mouth three times daily 90 tablet 1    prazosin (MINIPRESS) 1 MG capsule Take 1 capsule by mouth nightly 30 capsule 3    mirtazapine (REMERON) 7.5 MG tablet Take 1 tablet by mouth nightly as needed (sleep) 30 tablet 3    carBAMazepine (TEGRETOL) 200 MG tablet TAKE ONE TABLET BY MOUTH THREE TIMES A DAY 90 tablet 5    metoclopramide (REGLAN) 5 MG tablet TAKE ONE TABLET BY MOUTH THREE TIMES A DAY 90 tablet 3    rOPINIRole (REQUIP) 1 MG tablet TAKE ONE TABLET BY MOUTH ONCE NIGHTLY 90 tablet 1    topiramate (TOPAMAX) 50 MG tablet Take 1.5 tab bid for 2 wk, then two tab bid 120 tablet 3    CREON 57869-07309 units delayed release capsule TAKE ONE CAPSULE BY MOUTH THREE TIMES A DAY WITH MEALS 90 capsule 1    omeprazole (PRILOSEC) 40 MG delayed release capsule TAKE ONE CAPSULE BY MOUTH TWICE A DAY 60 capsule 2    traZODone (DESYREL) 50 MG tablet TAKE ONE TABLET BY MOUTH ONCE NIGHTLY AS NEEDED FOR SLEEP 30 tablet 0    fluticasone-umeclidin-vilant (TRELEGY ELLIPTA) 100-62.5-25 MCG/INH AEPB Inhale 1 puff into the lungs daily 1 each 5    denosumab (PROLIA) 60 MG/ML SOSY SC injection Inject 1 mL into the skin every 6 months (Patient not taking: Reported on 12/16/2021) 1 mL 1    pregabalin (LYRICA) 200 MG capsule Take 1 capsule by mouth 3 times daily for 180 days.  90 capsule 5    amLODIPine (NORVASC) 5 MG tablet TAKE ONE TABLET BY MOUTH DAILY 90 tablet 1    simethicone (MYLICON) 80 MG chewable tablet Take 1 tablet by mouth 4 times daily as needed for Flatulence 180 tablet 3    albuterol sulfate HFA (VENTOLIN HFA) 108 (90 Base) MCG/ACT inhaler Inhale 2 puffs into the lungs 4 times daily as needed for Wheezing 1 Inhaler 5    vitamin D (ERGOCALCIFEROL) 64054 units CAPS capsule Take 1 capsule by mouth once a week 12 capsule 1    folic acid (FOLVITE) 1 MG tablet Take 1 tablet by mouth daily 90 tablet 1     No current facility-administered medications for this encounter. Social History:   Social History     Socioeconomic History    Marital status: Single     Spouse name: Not on file    Number of children: Not on file    Years of education: Not on file    Highest education level: Not on file   Occupational History    Not on file   Tobacco Use    Smoking status: Current Every Day Smoker     Packs/day: 1.00     Years: 30.00     Pack years: 30.00     Types: Cigarettes    Smokeless tobacco: Never Used   Vaping Use    Vaping Use: Never used   Substance and Sexual Activity    Alcohol use: Not Currently     Alcohol/week: 0.0 standard drinks    Drug use: Not Currently     Frequency: 2.0 times per week     Comment: 6 months ago    Sexual activity: Not Currently   Other Topics Concern    Not on file   Social History Narrative    Not on file     Social Determinants of Health     Financial Resource Strain: Medium Risk    Difficulty of Paying Living Expenses: Somewhat hard   Food Insecurity: No Food Insecurity    Worried About Running Out of Food in the Last Year: Never true    [place] of Food in the Last Year: Never true   Transportation Needs:     Lack of Transportation (Medical): Not on file    Lack of Transportation (Non-Medical):  Not on file   Physical Activity:     Days of Exercise per Week: Not on file    Minutes of Exercise per Session: Not on file   Stress:     Feeling of Stress : Not on file   Social Connections:     Frequency of Communication with Friends and Family: Not on file    Frequency of Social Gatherings with Friends and Family: Not on file    Attends Sikhism Services: Not on file    Active Member of Clubs or Organizations: Not on file    Attends Club or Organization Meetings: Not on file    Marital Status: Not on file   Intimate Partner Violence:     Fear of Current or Ex-Partner: Not on file    Emotionally Abused: Not on file    Physically Abused: Not on file   [place]ared[place] Sexually Abused: Not on file   Housing Stability:     Unable to Pay for Housing in the Last Year: Not on file    Number of Jarret in the Last Year: Not on file    Unstable Housing in the Last Year: Not on file       TOBACCO:   reports that she has been smoking cigarettes. She has a 30.00 pack-year smoking history. She has never used smokeless tobacco.  ETOH:   reports previous alcohol use. Family History:   Family History   Problem Relation Age of Onset    Other Father     Cancer Maternal Grandmother     Heart Disease Paternal Grandmother     Esophageal Cancer Maternal Aunt     Arthritis Mother            Pt interventions:  Trained in relaxation strategies, Provided education, Discussed self-care (sleep, nutrition, rewarding activities, social support, exercise), Provided education on PTSD symptoms and treatment options for evidence-based treatment (Cognitive Processing Therapy and Prolonged Exposure), Discussed and problem-solved barriers in adhering to behavioral change plan, Discussed use of imagery, distractions, relaxation, mood management, communication training, questioning unhelpful thinking, problem-solving, and behavioral activation to manage pain, Established rapport, Supportive techniques, CBT to target thoughts and beliefs, Cognitive strategies to target negative thoughts  including negative self talk, Explained relaxed breathing technique in detail and practiced this with pt in visit, Emphasized importance of regular practice of relaxation strategies to target / promote emotional regulation and Problem-solving re: doing dishes        PLAN:   Review pt's solutions to problem solving  Cognitive techniques  Therapist will look into more psychs       Patient scheduled to return on:   Friday 1/28/22 @ 10 AM virtual   Pt will then hav eweekly appts on Wed at 2 PM   Were changes or additions made to the treatment plan today?   YES []   NO []  Noted changes:

## 2022-01-25 ENCOUNTER — ANESTHESIA EVENT (OUTPATIENT)
Dept: OPERATING ROOM | Age: 52
End: 2022-01-25
Payer: MEDICARE

## 2022-01-25 ENCOUNTER — HOSPITAL ENCOUNTER (OUTPATIENT)
Age: 52
Setting detail: OUTPATIENT SURGERY
Discharge: HOME | End: 2022-01-25
Attending: COLON & RECTAL SURGERY | Admitting: COLON & RECTAL SURGERY
Payer: MEDICARE

## 2022-01-25 ENCOUNTER — ANESTHESIA (OUTPATIENT)
Dept: OPERATING ROOM | Age: 52
End: 2022-01-25
Payer: MEDICARE

## 2022-01-25 VITALS
DIASTOLIC BLOOD PRESSURE: 107 MMHG | HEART RATE: 76 BPM | RESPIRATION RATE: 20 BRPM | BODY MASS INDEX: 23.32 KG/M2 | SYSTOLIC BLOOD PRESSURE: 149 MMHG | TEMPERATURE: 97 F | WEIGHT: 140 LBS | HEIGHT: 65 IN | OXYGEN SATURATION: 92 %

## 2022-01-25 VITALS — OXYGEN SATURATION: 100 % | DIASTOLIC BLOOD PRESSURE: 75 MMHG | SYSTOLIC BLOOD PRESSURE: 110 MMHG | TEMPERATURE: 90.2 F

## 2022-01-25 DIAGNOSIS — A63.0 CONDYLOMA: Primary | ICD-10-CM

## 2022-01-25 PROCEDURE — 7100000011 HC PHASE II RECOVERY - ADDTL 15 MIN: Performed by: COLON & RECTAL SURGERY

## 2022-01-25 PROCEDURE — 2709999900 HC NON-CHARGEABLE SUPPLY: Performed by: COLON & RECTAL SURGERY

## 2022-01-25 PROCEDURE — 3600000014 HC SURGERY LEVEL 4 ADDTL 15MIN: Performed by: COLON & RECTAL SURGERY

## 2022-01-25 PROCEDURE — C9290 INJ, BUPIVACAINE LIPOSOME: HCPCS | Performed by: COLON & RECTAL SURGERY

## 2022-01-25 PROCEDURE — 7100000010 HC PHASE II RECOVERY - FIRST 15 MIN: Performed by: COLON & RECTAL SURGERY

## 2022-01-25 PROCEDURE — 2580000003 HC RX 258: Performed by: ANESTHESIOLOGY

## 2022-01-25 PROCEDURE — A4217 STERILE WATER/SALINE, 500 ML: HCPCS | Performed by: COLON & RECTAL SURGERY

## 2022-01-25 PROCEDURE — 6370000000 HC RX 637 (ALT 250 FOR IP)

## 2022-01-25 PROCEDURE — 6360000002 HC RX W HCPCS: Performed by: NURSE ANESTHETIST, CERTIFIED REGISTERED

## 2022-01-25 PROCEDURE — 88305 TISSUE EXAM BY PATHOLOGIST: CPT

## 2022-01-25 PROCEDURE — 2580000003 HC RX 258: Performed by: COLON & RECTAL SURGERY

## 2022-01-25 PROCEDURE — 7100000001 HC PACU RECOVERY - ADDTL 15 MIN: Performed by: COLON & RECTAL SURGERY

## 2022-01-25 PROCEDURE — 2500000003 HC RX 250 WO HCPCS: Performed by: NURSE ANESTHETIST, CERTIFIED REGISTERED

## 2022-01-25 PROCEDURE — 6360000002 HC RX W HCPCS: Performed by: COLON & RECTAL SURGERY

## 2022-01-25 PROCEDURE — 2500000003 HC RX 250 WO HCPCS: Performed by: COLON & RECTAL SURGERY

## 2022-01-25 PROCEDURE — 3600000004 HC SURGERY LEVEL 4 BASE: Performed by: COLON & RECTAL SURGERY

## 2022-01-25 PROCEDURE — 6370000000 HC RX 637 (ALT 250 FOR IP): Performed by: COLON & RECTAL SURGERY

## 2022-01-25 PROCEDURE — 3700000000 HC ANESTHESIA ATTENDED CARE: Performed by: COLON & RECTAL SURGERY

## 2022-01-25 PROCEDURE — 7100000000 HC PACU RECOVERY - FIRST 15 MIN: Performed by: COLON & RECTAL SURGERY

## 2022-01-25 PROCEDURE — 3700000001 HC ADD 15 MINUTES (ANESTHESIA): Performed by: COLON & RECTAL SURGERY

## 2022-01-25 RX ORDER — SODIUM CHLORIDE, SODIUM LACTATE, POTASSIUM CHLORIDE, CALCIUM CHLORIDE 600; 310; 30; 20 MG/100ML; MG/100ML; MG/100ML; MG/100ML
INJECTION, SOLUTION INTRAVENOUS CONTINUOUS
Status: DISCONTINUED | OUTPATIENT
Start: 2022-01-25 | End: 2022-01-25 | Stop reason: HOSPADM

## 2022-01-25 RX ORDER — LIDOCAINE 50 MG/G
OINTMENT TOPICAL
Qty: 30 G | Refills: 1 | Status: SHIPPED | OUTPATIENT
Start: 2022-01-25 | End: 2022-01-25 | Stop reason: SDUPTHER

## 2022-01-25 RX ORDER — OXYCODONE HYDROCHLORIDE 5 MG/1
5 TABLET ORAL EVERY 6 HOURS PRN
Qty: 28 TABLET | Refills: 0 | Status: SHIPPED | OUTPATIENT
Start: 2022-01-25 | End: 2022-02-01

## 2022-01-25 RX ORDER — IPRATROPIUM BROMIDE AND ALBUTEROL SULFATE 2.5; .5 MG/3ML; MG/3ML
SOLUTION RESPIRATORY (INHALATION)
Status: COMPLETED
Start: 2022-01-25 | End: 2022-01-25

## 2022-01-25 RX ORDER — BUPIVACAINE HYDROCHLORIDE AND EPINEPHRINE 2.5; 5 MG/ML; UG/ML
INJECTION, SOLUTION EPIDURAL; INFILTRATION; INTRACAUDAL; PERINEURAL PRN
Status: DISCONTINUED | OUTPATIENT
Start: 2022-01-25 | End: 2022-01-25 | Stop reason: ALTCHOICE

## 2022-01-25 RX ORDER — ROCURONIUM BROMIDE 10 MG/ML
INJECTION, SOLUTION INTRAVENOUS PRN
Status: DISCONTINUED | OUTPATIENT
Start: 2022-01-25 | End: 2022-01-25 | Stop reason: SDUPTHER

## 2022-01-25 RX ORDER — IPRATROPIUM BROMIDE AND ALBUTEROL SULFATE 2.5; .5 MG/3ML; MG/3ML
1 SOLUTION RESPIRATORY (INHALATION) ONCE
Status: DISCONTINUED | OUTPATIENT
Start: 2022-01-25 | End: 2022-01-25 | Stop reason: HOSPADM

## 2022-01-25 RX ORDER — LIDOCAINE 50 MG/G
OINTMENT TOPICAL
Qty: 30 G | Refills: 1 | Status: SHIPPED | OUTPATIENT
Start: 2022-01-25

## 2022-01-25 RX ORDER — SENNA PLUS 8.6 MG/1
1 TABLET ORAL 2 TIMES DAILY
Qty: 60 TABLET | Refills: 11 | Status: SHIPPED | OUTPATIENT
Start: 2022-01-25 | End: 2023-01-25

## 2022-01-25 RX ORDER — ONDANSETRON 2 MG/ML
INJECTION INTRAMUSCULAR; INTRAVENOUS PRN
Status: DISCONTINUED | OUTPATIENT
Start: 2022-01-25 | End: 2022-01-25 | Stop reason: SDUPTHER

## 2022-01-25 RX ORDER — FENTANYL CITRATE 50 UG/ML
INJECTION, SOLUTION INTRAMUSCULAR; INTRAVENOUS PRN
Status: DISCONTINUED | OUTPATIENT
Start: 2022-01-25 | End: 2022-01-25 | Stop reason: SDUPTHER

## 2022-01-25 RX ORDER — GINSENG 100 MG
CAPSULE ORAL PRN
Status: DISCONTINUED | OUTPATIENT
Start: 2022-01-25 | End: 2022-01-25 | Stop reason: ALTCHOICE

## 2022-01-25 RX ORDER — MAGNESIUM HYDROXIDE 1200 MG/15ML
LIQUID ORAL PRN
Status: DISCONTINUED | OUTPATIENT
Start: 2022-01-25 | End: 2022-01-25 | Stop reason: ALTCHOICE

## 2022-01-25 RX ORDER — MIDAZOLAM HYDROCHLORIDE 1 MG/ML
INJECTION INTRAMUSCULAR; INTRAVENOUS PRN
Status: DISCONTINUED | OUTPATIENT
Start: 2022-01-25 | End: 2022-01-25 | Stop reason: SDUPTHER

## 2022-01-25 RX ORDER — LIDOCAINE HYDROCHLORIDE 10 MG/ML
INJECTION, SOLUTION EPIDURAL; INFILTRATION; INTRACAUDAL; PERINEURAL PRN
Status: DISCONTINUED | OUTPATIENT
Start: 2022-01-25 | End: 2022-01-25 | Stop reason: SDUPTHER

## 2022-01-25 RX ORDER — CYCLOBENZAPRINE HCL 10 MG
10 TABLET ORAL 3 TIMES DAILY PRN
Qty: 21 TABLET | Refills: 0 | Status: SHIPPED | OUTPATIENT
Start: 2022-01-25 | End: 2022-02-04

## 2022-01-25 RX ORDER — MAGNESIUM HYDROXIDE 1200 MG/15ML
LIQUID ORAL CONTINUOUS PRN
Status: COMPLETED | OUTPATIENT
Start: 2022-01-25 | End: 2022-01-25

## 2022-01-25 RX ORDER — FENTANYL CITRATE 50 UG/ML
50 INJECTION, SOLUTION INTRAMUSCULAR; INTRAVENOUS EVERY 5 MIN PRN
Status: DISCONTINUED | OUTPATIENT
Start: 2022-01-25 | End: 2022-01-25 | Stop reason: HOSPADM

## 2022-01-25 RX ORDER — PROPOFOL 10 MG/ML
INJECTION, EMULSION INTRAVENOUS PRN
Status: DISCONTINUED | OUTPATIENT
Start: 2022-01-25 | End: 2022-01-25 | Stop reason: SDUPTHER

## 2022-01-25 RX ORDER — FENTANYL CITRATE 50 UG/ML
25 INJECTION, SOLUTION INTRAMUSCULAR; INTRAVENOUS EVERY 5 MIN PRN
Status: DISCONTINUED | OUTPATIENT
Start: 2022-01-25 | End: 2022-01-25 | Stop reason: HOSPADM

## 2022-01-25 RX ADMIN — FENTANYL CITRATE 100 MCG: 50 INJECTION, SOLUTION INTRAMUSCULAR; INTRAVENOUS at 13:36

## 2022-01-25 RX ADMIN — SODIUM CHLORIDE, POTASSIUM CHLORIDE, SODIUM LACTATE AND CALCIUM CHLORIDE: 600; 310; 30; 20 INJECTION, SOLUTION INTRAVENOUS at 11:54

## 2022-01-25 RX ADMIN — ROCURONIUM BROMIDE 40 MG: 10 INJECTION INTRAVENOUS at 13:36

## 2022-01-25 RX ADMIN — SODIUM CHLORIDE, POTASSIUM CHLORIDE, SODIUM LACTATE AND CALCIUM CHLORIDE: 600; 310; 30; 20 INJECTION, SOLUTION INTRAVENOUS at 15:15

## 2022-01-25 RX ADMIN — LIDOCAINE HYDROCHLORIDE 50 MG: 10 INJECTION, SOLUTION EPIDURAL; INFILTRATION; INTRACAUDAL; PERINEURAL at 13:36

## 2022-01-25 RX ADMIN — SUGAMMADEX 200 MG: 100 INJECTION, SOLUTION INTRAVENOUS at 14:38

## 2022-01-25 RX ADMIN — PHENYLEPHRINE HYDROCHLORIDE 100 MCG: 10 INJECTION INTRAVENOUS at 14:20

## 2022-01-25 RX ADMIN — PHENYLEPHRINE HYDROCHLORIDE 100 MCG: 10 INJECTION INTRAVENOUS at 13:45

## 2022-01-25 RX ADMIN — PHENYLEPHRINE HYDROCHLORIDE 100 MCG: 10 INJECTION INTRAVENOUS at 13:42

## 2022-01-25 RX ADMIN — ONDANSETRON 4 MG: 2 INJECTION, SOLUTION INTRAMUSCULAR; INTRAVENOUS at 14:32

## 2022-01-25 RX ADMIN — ROCURONIUM BROMIDE 10 MG: 10 INJECTION INTRAVENOUS at 14:12

## 2022-01-25 RX ADMIN — PHENYLEPHRINE HYDROCHLORIDE 100 MCG: 10 INJECTION INTRAVENOUS at 14:28

## 2022-01-25 RX ADMIN — PROPOFOL 200 MG: 10 INJECTION, EMULSION INTRAVENOUS at 13:36

## 2022-01-25 RX ADMIN — MIDAZOLAM HYDROCHLORIDE 2 MG: 1 INJECTION, SOLUTION INTRAMUSCULAR; INTRAVENOUS at 13:36

## 2022-01-25 RX ADMIN — PROPOFOL 70 MG: 10 INJECTION, EMULSION INTRAVENOUS at 14:39

## 2022-01-25 RX ADMIN — IPRATROPIUM BROMIDE AND ALBUTEROL SULFATE 3 ML: .5; 3 SOLUTION RESPIRATORY (INHALATION) at 11:55

## 2022-01-25 ASSESSMENT — PULMONARY FUNCTION TESTS
PIF_VALUE: 3
PIF_VALUE: 25
PIF_VALUE: 3
PIF_VALUE: 3
PIF_VALUE: 4
PIF_VALUE: 16
PIF_VALUE: 24
PIF_VALUE: 23
PIF_VALUE: 22
PIF_VALUE: 12
PIF_VALUE: 19
PIF_VALUE: 13
PIF_VALUE: 24
PIF_VALUE: 4
PIF_VALUE: 26
PIF_VALUE: 24
PIF_VALUE: 27
PIF_VALUE: 27
PIF_VALUE: 12
PIF_VALUE: 13
PIF_VALUE: 17
PIF_VALUE: 24
PIF_VALUE: 10
PIF_VALUE: 25
PIF_VALUE: 13
PIF_VALUE: 4
PIF_VALUE: 25
PIF_VALUE: 24
PIF_VALUE: 23
PIF_VALUE: 24
PIF_VALUE: 24
PIF_VALUE: 17
PIF_VALUE: 27
PIF_VALUE: 12
PIF_VALUE: 16
PIF_VALUE: 5
PIF_VALUE: 13
PIF_VALUE: 36
PIF_VALUE: 0
PIF_VALUE: 27
PIF_VALUE: 24
PIF_VALUE: 18
PIF_VALUE: 26
PIF_VALUE: 3
PIF_VALUE: 24
PIF_VALUE: 24
PIF_VALUE: 12
PIF_VALUE: 17
PIF_VALUE: 15
PIF_VALUE: 17
PIF_VALUE: 24
PIF_VALUE: 23
PIF_VALUE: 17
PIF_VALUE: 23
PIF_VALUE: 17
PIF_VALUE: 13
PIF_VALUE: 23
PIF_VALUE: 3
PIF_VALUE: 12
PIF_VALUE: 23
PIF_VALUE: 0
PIF_VALUE: 24
PIF_VALUE: 12
PIF_VALUE: 12
PIF_VALUE: 15
PIF_VALUE: 23
PIF_VALUE: 13
PIF_VALUE: 26
PIF_VALUE: 22
PIF_VALUE: 0
PIF_VALUE: 13
PIF_VALUE: 24
PIF_VALUE: 22
PIF_VALUE: 12
PIF_VALUE: 26
PIF_VALUE: 24
PIF_VALUE: 26
PIF_VALUE: 2
PIF_VALUE: 17
PIF_VALUE: 19
PIF_VALUE: 14
PIF_VALUE: 26
PIF_VALUE: 23
PIF_VALUE: 24
PIF_VALUE: 26
PIF_VALUE: 24
PIF_VALUE: 8
PIF_VALUE: 23
PIF_VALUE: 17
PIF_VALUE: 22
PIF_VALUE: 24
PIF_VALUE: 25
PIF_VALUE: 0
PIF_VALUE: 13
PIF_VALUE: 26
PIF_VALUE: 5
PIF_VALUE: 24
PIF_VALUE: 15
PIF_VALUE: 24
PIF_VALUE: 24
PIF_VALUE: 25
PIF_VALUE: 18
PIF_VALUE: 1
PIF_VALUE: 16
PIF_VALUE: 27
PIF_VALUE: 1
PIF_VALUE: 26

## 2022-01-25 ASSESSMENT — PAIN SCALES - GENERAL
PAINLEVEL_OUTOF10: 0

## 2022-01-25 ASSESSMENT — LIFESTYLE VARIABLES: SMOKING_STATUS: 1

## 2022-01-25 ASSESSMENT — PAIN - FUNCTIONAL ASSESSMENT: PAIN_FUNCTIONAL_ASSESSMENT: 0-10

## 2022-01-25 NOTE — PROGRESS NOTES
COUNTS:  INITIAL: COUNTED BY [facility] C[facility] AND VERIFIED BY KEITH Irby RN.  CLOSING: COUNTED BY [facility] CST AND VERIFIED BY Maude Aldana RN. FINAL: COUNTED BY [facility] AND Maude Aldana RN. ALL COUNTS CORRECT. PREP: PREPPED WITH BETADINE PAINT ON ANUS AND VULVA BY Maude Aldana RN PER DR. Dash Reynolds. PRE OP SKIN: RAISED BLACKENED CONDYLOMA LESIONS TO ANUS AND VULVA. POST OP SKIN: ANUS AND VULVA ARE REDDENED. LASER PRECAUTIONS TAKEN. ALL STAFF WEARING MASKS AND GOGGLES. STERILE WATER ON THE FIELD.

## 2022-01-25 NOTE — ANESTHESIA PRE PROCEDURE
Department of Anesthesiology  Preprocedure Note       Name:  Crystal Linares   Age:  45 y.o.  :  1970                                          MRN:  [id number]         Date:  2022      Surgeon: Josue Harding):  Michel Price MD    Procedure: Procedure(s):  EXCISION AND FULGURATION, ANAL AND PERIANAL WARTS WITH CO2 LASER, Pending sale to [facility]#[id number] ([facility])    Medications prior to admission:   Prior to Admission medications    Medication Sig Start Date End Date Taking?  Authorizing Provider   sucralfate (CARAFATE) 1 GM tablet Take 1 tablet by mouth 4 times daily 22  Yes Idalia Brink MD   sodium chloride 1 g tablet TAKE ONE TABLET BY MOUTH THREE TIMES A DAY  Patient taking differently: Take 1 g by mouth 4 times daily  22  Yes Bart Simms MD   escitalopram (LEXAPRO) 10 MG tablet Take 1.5 tablets by mouth daily 22  Yes LOI Zuniga NP   chlorproMAZINE (THORAZINE) 10 MG tablet Take 1 tablet by mouth 3 times daily as needed (anxiety) 22  Yes LOI Zuniga NP   busPIRone (BUSPAR) 15 MG tablet Take 15 mg by mouth three times daily 22  Yes LOI Zuniga NP   prazosin (MINIPRESS) 1 MG capsule Take 1 capsule by mouth nightly 22  Yes LOI Zuniga NP   mirtazapine (REMERON) 7.5 MG tablet Take 1 tablet by mouth nightly as needed (sleep) 22  Yes LOI Zuniga NP   carBAMazepine (TEGRETOL) 200 MG tablet TAKE ONE TABLET BY MOUTH THREE TIMES A DAY 1/10/22  Yes Ludivina Obrien MD   metoclopramide (REGLAN) 5 MG tablet TAKE ONE TABLET BY MOUTH THREE TIMES A DAY 1/10/22  Yes Idalia Brink MD   rOPINIRole (REQUIP) 1 MG tablet TAKE ONE TABLET BY MOUTH ONCE NIGHTLY 22  Yes Ludivina Obrien MD   topiramate (TOPAMAX) 50 MG tablet Take 1.5 tab bid for 2 wk, then two tab bid  Patient taking differently: Take 50 mg by mouth 3 times daily Take 1.5 tab bid for 2 wk, then two tab bid 21  Yes Seymour Blandon MD   CREON 93686-66826 units delayed release capsule TAKE ONE CAPSULE BY MOUTH THREE TIMES A DAY WITH MEALS 12/20/21  Yes Ludivina Barrientos MD   omeprazole (PRILOSEC) 40 MG delayed release capsule TAKE ONE CAPSULE BY MOUTH TWICE A DAY 12/17/21  Yes Pauline Huynh MD   fluticasone-umeclidin-vilant (TRELEGY ELLIPTA) 432-00.1-39 MCG/INH AEPB Inhale 1 puff into the lungs daily 11/1/21  Yes Ludivina Barrientos MD   denosumab (PROLIA) 60 MG/ML SOSY SC injection Inject 1 mL into the skin every 6 months 11/1/21  Yes Ludivina Barrientos MD   pregabalin (LYRICA) 200 MG capsule Take 1 capsule by mouth 3 times daily for 180 days.  10/9/21 4/7/22 Yes Kenneth Perez MD   amLODIPine (NORVASC) 5 MG tablet TAKE ONE TABLET BY MOUTH DAILY 10/6/21  Yes Ludivina Barrientos MD   simethicone (MYLICON) 80 MG chewable tablet Take 1 tablet by mouth 4 times daily as needed for Flatulence 8/27/21  Yes Pauline Huynh MD   albuterol sulfate HFA (VENTOLIN HFA) 108 (90 Base) MCG/ACT inhaler Inhale 2 puffs into the lungs 4 times daily as needed for Wheezing 6/11/20  Yes Ludivina Barrientos MD   vitamin D (ERGOCALCIFEROL) 55194 units CAPS capsule Take 1 capsule by mouth once a week 6/19/19  Yes Ludivina Barrientos MD   folic acid (FOLVITE) 1 MG tablet Take 1 tablet by mouth daily 6/19/19  Yes Ludivina Barrientos MD       Current medications:    Current Facility-Administered Medications   Medication Dose Route Frequency Provider Last Rate Last Admin    lactated ringers infusion   IntraVENous Continuous Roxie Mari MD           Allergies:  No Known Allergies    Problem List:    Patient Active Problem List   Diagnosis Code    Acute bronchitis J20.9    Reflex sympathetic dystrophy of lower limb G90.529    Essential hypertension I10    Nicotine addiction F17.200    Psychophysiological insomnia F51.04    Anxiety R74.7    Alcoholic peripheral neuropathy (HCC) G62.1    Hypokalemia E87.6    Macrocytic anemia D53.9    Hypomagnesemia E83.42    Tobacco use Z72.0    Ambulatory dysfunction R26.2    Seizure disorder ([facility] G40.909    COPD with acute exacerbation ([facility]) J44.1    Paresthesia of lower extremity R20.2    Acute respiratory failure with hypoxia ([facility]) J96.01    Pancreatic cyst K86.2    Recurrent major depressive disorder (HCC) F33.9    Elevated CA 19-9 level R97.8    Perineal mass, female N90.89    Esophagitis candidal  B37.81    Condyloma A63.0    Astrocytoma ([facility] - 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24, the patient underwent 2 surgical resections without known recurrence C71.9    Alcohol use disorder, severe, in early remission ([facility] D95.63    Metabolic encephalopathy P50.62    AMAN (generalized anxiety disorder) F41.1    Major depressive disorder, recurrent severe without psychotic features ([facility] F33.2    Esophageal dysphagia R13.19    Chronic peripheral neuropathic pain M79.2, G89.29    History of alcohol abuse F10.11    History of petit-mal seizures Z86.69    Intractable episodic tension-type headache G44. [address] history of astrocytoma Z85.841    Multilevel spine pain M54.9    Chronic pain syndrome G89.4    Marijuana abuse F12.10    Severe sepsis ([facility]) A41.9, R65.20    Closed fracture of fifth thoracic vertebra ([facility]) S22.059A    Diverticulitis of large intestine with abscess without bleeding K57.20    Elevated brain natriuretic peptide (BNP) level R79.89    Elevated alkaline phosphatase level R74.8    Chronic pancreatitis ([facility]) K86.1    Erythema ab igne L59.0    Compression fracture of thoracic vertebra ([facility]) S22.000A    Pathological compression fracture of lumbar vertebra with delayed healing U65.24OS    Metabolic acidosis with increased anion gap and accumulation of organic acids E87.2    Community acquired pneumonia of left lower lobe of lung J18.9    Septicemia ([facility]) A41.9    Alcohol-induced acute pancreatitis K85.20    Fluid collection of pancreas K86.89    Diverticulitis of large intestine without perforation or abscess with bleeding K57.33  Pseudocyst of pancreas K86.3    Bandemia D72.825    Sepsis ([facility] A41.9    Acute recurrent pancreatitis K85.90    Atelectasis of left lung J98.11    Hyponatremia E87.1    Iron deficiency anemia D50.9    Adenomatous polyp of sigmoid colon D12.5    Moderate episode of recurrent major depressive disorder ([facility]) F33.1       Past Medical History:        Diagnosis Date    Acute respiratory failure with hypoxia ([facility] 10/16/2020    Alcohol withdrawal syndrome, with delirium ([facility] 12/14/2019    Alcoholism ([facility]     Anal warts     Anemia 10/2020    GI bleed    Anxiety     Astrocytoma ([facility] - 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24, the patient underwent 2 surgical resections without known recurrence 10/23/2020    at age 24    Closed fracture of lateral portion of left tibial plateau 65/74/5052    COPD (chronic obstructive pulmonary disease) ([facility])     CO2 retainer, on Bipap at night for this, Dr. Chalo Goyal ( last visit 11/20/2020 and note on chart )    Depression      major depressive disorder, ptsd, anxiety    Dysphagia     GI bleed 10/2020    Hypertension     Memory loss     Oxygen dependent     USES AT NIGHT - 3L/MIN    Pain, joint, ankle and foot     Pancreatic lesion 10/2020    Dr. Vibha Underwood working up pt    Perianal wart     Peripheral neuropathy     Seizures ([facility]     LAST SEIZURE 3/2020 - FEELS IT WAS FROM ALCOHOL WITHDRAWL    Tension headache     Under care of team 09/29/2021    neuro-Dr Coyle[facility]-last visit sep 2021    Under care of team 09/29/2021    pain management-Hamzah Martines-[facility] jimenez-last visit sep 2021    Under care of team 09/29/2021    pulmonology-Dr Dueñas-[facility]-due for visit oct 26/2021    Under care of team 09/29/2021    psych-bahnfeldt NP-telemed-last visit 10/ 2021    Under care of team 09/29/2021    [address]-last visit aug 2021    Under care of team     NEPHROLOGY - DR. CHAPMAN     Wears glasses     Wears partial dentures     UPPER    Wellness examination 09/29/2021    pcp-Ludivina Grimm-[facility]-last visit 2021       Past Surgical History:        Procedure Laterality Date    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    COLONOSCOPY N/A 10/22/2020    COLONOSCOPY DIAGNOSTIC performed by Tr Guevara MD at [facility]    COLONOSCOPY N/A 11/19/2021    COLONOSCOPY W/ ENDOSCOPIC MUCOSAL RESECTION performed by Tr Guevara MD at [address]  07/28/2021    CT ABSCESS DRAIN SUBCUTANEOUS 7/28/2021 [facility] CT SCAN    ENDOSCOPIC ULTRASOUND (LOWER) N/A 12/09/2020    ENDOSCOPIC ULTRASOUND, UPPER WITH LINEAR SCOPE FOR BIOPSY OF MASS ON HEAD OF PANCREAS performed by Ashlee Swan MD at [facility] ERCP  08/11/2021    ERCP N/A 08/11/2021    ERCP STENT INSERTION performed by Aliyah Campoverde MD at [facility]    ERCP  08/11/2021    ERCP SPHINCTER/PAPILLOTOMY performed by Aliyah Campoverde MD at [facility]    ERCP  10/21/2021    ERCP STONE REMOVAL    ERCP N/A 10/21/2021    ERCP STENT REMOVAL/EXCHANGE performed by Aliyah Campoverde MD at [address] ERCP  10/21/2021    ERCP STONE REMOVAL performed by Aliyah Campoverde MD at [facility] ER[facility]  10/21/2021    ERCP ENDOSCOPIC RETROGRADE CHOLANGIOPANCREATOGRAPHY DIRECT VISUALIZATION performed by Aliyah Campoverde MD at [address] Left 07/03/2013    ORIF tibial plateau    FRACTURE SURGERY Right     small finger metacarpal fracture    HAND SURGERY      HYSTERECTOMY  2003    KYPHOSIS SURGERY      SPINE SURGERY N/A 09/10/2021    KYPHOPLASTY lumbar L5 performed by Soumya Maria MD at [facility] 10/22/2020    EGD BIOPSY performed by Tr Guevara MD at [address] [place] [address] 04/05/2021    EGD BIOPSY performed by Tr Guevara MD at [address] [place] [address] 09/08/2021    ENDOSCOPIC ULTRASOUND, LINEAR SCOPE performed by Ashlee Swan MD at [facility] Social History:    Social History     Tobacco Use    Smoking status: Current Every Day Smoker     Packs/day: 1.00     Years: 30.00     Pack years: 30.00     Types: Cigarettes    Smokeless tobacco: Never Used   Substance Use Topics    Alcohol use: Not Currently     Alcohol/week: 0.0 standard drinks                                Ready to quit: Not Answered  Counseling given: Not Answered      Vital Signs (Current):   Vitals:    01/21/22 0923   Weight: 140 lb (63.5 kg)   Height: 5' 5\" (1.651 m)                                              BP Readings from Last 3 Encounters:   01/21/22 113/77   01/05/22 128/84   12/16/21 131/89       NPO Status:                                                                                 BMI:   Wt Readings from Last 3 Encounters:   01/21/22 140 lb (63.5 kg)   01/21/22 140 lb (63.5 kg)   01/05/22 137 lb 9.6 oz (62.4 kg)     Body mass index is 23.3 kg/m². CBC:   Lab Results   Component Value Date    WBC 10.4 11/09/2021    RBC 4.45 11/09/2021    RBC 4.16 04/30/2012    HGB 12.7 11/09/2021    HCT 40.2 11/09/2021    MCV 90.3 11/09/2021    RDW 13.8 11/09/2021     11/09/2021     04/30/2012       CMP:   Lab Results   Component Value Date     01/10/2022    K 4.4 01/10/2022     01/10/2022    CO2 21 01/10/2022    BUN 7 01/10/2022    CREATININE 0.53 01/10/2022    GFRAA >60 01/10/2022    LABGLOM >60 01/10/2022    GLUCOSE 74 01/10/2022    GLUCOSE 92 04/30/2012    PROT 7.3 10/12/2021    CALCIUM 9.7 01/10/2022    BILITOT 0.19 10/12/2021    ALKPHOS 211 10/12/2021    AST 14 10/12/2021    ALT 7 10/12/2021       POC Tests: No results for input(s): POCGLU, POCNA, POCK, POCCL, POCBUN, POCHEMO, POCHCT in the last 72 hours.     Coags:   Lab Results   Component Value Date    PROTIME 10.9 08/09/2021    INR 1.0 08/09/2021    APTT 24.6 08/09/2021       HCG (If Applicable): No results found for: PREGTESTUR, PREGSERUM, HCG, HCGQUANT     ABGs: No results found for: PHART, PO2ART, SIJ4ZCO, HVB9PAF, BEART, Q7GUEJAY     Type & Screen (If Applicable):  No results found for: LABABO, LABRH    Drug/Infectious Status (If Applicable):  Lab Results   Component Value Date    HEPCAB NONREACTIVE 07/14/2019       COVID-19 Screening (If Applicable):   Lab Results   Component Value Date    COVID19 Not Detected 07/22/2021    COVID19 Not Detected 05/20/2021    COVID19 Not Detected 12/05/2020           Anesthesia Evaluation    Airway: Mallampati: II     Neck ROM: full   Dental:    (+) partials      Pulmonary:   (+) pneumonia:  COPD:  rhonchi,  wheezes,  current smoker                          ROS comment: On 3 L oxygen at night   Cardiovascular:    (+) hypertension:,         Rhythm: regular  Rate: normal                    Neuro/Psych:   (+) neuromuscular disease:, headaches:, psychiatric history:depression/anxiety             GI/Hepatic/Renal: Neg GI/Hepatic/Renal ROS            Endo/Other: Negative Endo/Other ROS                    Abdominal:         (-) obese       Vascular: Other Findings:             Anesthesia Plan      general     ASA 4       Induction: intravenous. Anesthetic plan and risks discussed with patient. Plan discussed with CRNA.                   Magno Piña MD   1/25/2022

## 2022-01-25 NOTE — BRIEF OP NOTE
Brief Postoperative Note      Patient: Yuliet Beverly  YOB: 1970  MRN: [id number]    Date of Procedure: 1/25/2022    Pre-Op Diagnosis: EXTENSIVE ANAL AND PERIANAL WARTS    Post-Op Diagnosis: Same       Procedure(s):  EXCISION AND FULGURATION, ANAL, VAGINAL AND PERIANAL WARTS WITH CO2 LASER, FORTEC, EXAM UNDER ANESTHESIA    Surgeon(s):  Seb Cano MD    Assistant:  Resident: Reina Hinojosa DO    Anesthesia: General    Estimated Blood Loss (mL): less than 50     Complications: None    Specimens:   ID Type Source Tests Collected by Time Destination   A : CONDYLOMA WARTS Tissue Anus SURGICAL PATHOLOGY Lorene Mercado MD 1/25/2022 1357        Implants:  * No implants in log *      Drains: * No LDAs found *    Findings: extensive vulvar, anal, and perianal condyloma acuminata     Electronically signed by Reina Hinojosa DO on 1/25/2022 at 3:13 PM

## 2022-01-25 NOTE — H&P
History and Physical    Pt Name: Luis Gentile  MRN: [id number]  YOB: 1970  Date of evaluation: 1/25/2022  Primary Care Physician: Charlie Sawyer MD    SUBJECTIVE:   History of Chief Complaint:    Luis Gentile is a 45 y.o. female who is scheduled today for EXCISION AND FULGURATION, ANAL AND PERIANAL WARTS WITH CO2 LASER. Patient has a history of pancreatitis, constipation, and dysphagia. Recent colonoscopy revealed condyoma to anal and perianal region. Patient denies any symptoms at this time. Allergies  has No Known Allergies. Medications  Prior to Admission medications    Medication Sig Start Date End Date Taking?  Authorizing Provider   sucralfate (CARAFATE) 1 GM tablet Take 1 tablet by mouth 4 times daily 1/21/22  Yes Daniel Dawson MD   sodium chloride 1 g tablet TAKE ONE TABLET BY MOUTH THREE TIMES A DAY  Patient taking differently: Take 1 g by mouth 4 times daily  1/20/22  Yes Charlie Connell MD   escitalopram (LEXAPRO) 10 MG tablet Take 1.5 tablets by mouth daily 1/11/22  Yes LOI Banks NP   chlorproMAZINE (THORAZINE) 10 MG tablet Take 1 tablet by mouth 3 times daily as needed (anxiety) 1/11/22  Yes LOI Banks NP   busPIRone (BUSPAR) 15 MG tablet Take 15 mg by mouth three times daily 1/11/22  Yes LOI Banks NP   prazosin (MINIPRESS) 1 MG capsule Take 1 capsule by mouth nightly 1/11/22  Yes LOI Banks NP   mirtazapine (REMERON) 7.5 MG tablet Take 1 tablet by mouth nightly as needed (sleep) 1/11/22  Yes LOI Banks NP   carBAMazepine (TEGRETOL) 200 MG tablet TAKE ONE TABLET BY MOUTH THREE TIMES A DAY 1/10/22  Yes Ludivina Metzger MD   metoclopramide (REGLAN) 5 MG tablet TAKE ONE TABLET BY MOUTH THREE TIMES A DAY 1/10/22  Yes Daniel Dawson MD   rOPINIRole (REQUIP) 1 MG tablet TAKE ONE TABLET BY MOUTH ONCE NIGHTLY 1/5/22  Yes Anish Christian MD   topiramate (TOPAMAX) 50 MG tablet Take 1.5 tab bid for 2 wk, then two tab bid  Patient taking differently: Take 50 mg by mouth 3 times daily Take 1.5 tab bid for 2 wk, then two tab bid 12/22/21  Yes Corby Farooq MD   CREON 47215-94290 units delayed release capsule TAKE ONE CAPSULE BY MOUTH THREE TIMES A DAY WITH MEALS 12/20/21  Yes Ludivina Tidwell MD   omeprazole (PRILOSEC) 40 MG delayed release capsule TAKE ONE CAPSULE BY MOUTH TWICE A DAY 12/17/21  Yes Sallie Arriola MD   fluticasone-umeclidin-vilant (TRELEGY ELLIPTA) 932-41.0-93 MCG/INH AEPB Inhale 1 puff into the lungs daily 11/1/21  Yes Ludivina Tidwell MD   denosumab (PROLIA) 60 MG/ML SOSY SC injection Inject 1 mL into the skin every 6 months 11/1/21  Yes Ludivina Tidwell MD   pregabalin (LYRICA) 200 MG capsule Take 1 capsule by mouth 3 times daily for 180 days.  10/9/21 4/7/22 Yes Carlos [address], MD   amLODIPine (NORVASC) 5 MG tablet TAKE ONE TABLET BY MOUTH DAILY 10/6/21  Yes Ludivina Tidwell MD   simethicone (MYLICON) 80 MG chewable tablet Take 1 tablet by mouth 4 times daily as needed for Flatulence 8/27/21  Yes Sallie Arriola MD   albuterol sulfate HFA (VENTOLIN HFA) 108 (90 Base) MCG/ACT inhaler Inhale 2 puffs into the lungs 4 times daily as needed for Wheezing 6/11/20  Yes Ludivina Tidwell MD   vitamin D (ERGOCALCIFEROL) 50118 units CAPS capsule Take 1 capsule by mouth once a week 6/19/19  Yes Ludivina Tidwell MD   folic acid (Gearline Gory) 1 MG tablet Take 1 tablet by mouth daily 6/19/19  Yes Emil Calvert MD     Past Medical History    has a past medical history of Acute respiratory failure with hypoxia ([address] Alcohol withdrawal syndrome, with delirium ([address] Alcoholism ([address] Anal warts, Anemia, Anxiety, Astrocytoma ([address] - 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24, the patient underwent 2 surgical resections without known recurrence, Closed fracture of lateral portion of left tibial plateau, COPD (chronic obstructive pulmonary disease) ([facility] Depression, Dysphagia, GI bleed, Hypertension, Memory loss, Oxygen dependent, Pain, joint, ankle and foot, Pancreatic lesion, Perianal wart, Peripheral neuropathy, Seizures ([facility] Tension headache, Under care of team, Under care of team, Under care of team, Under care of team, Under care of team, Under care of team, Wears glasses, Wears partial dentures, and Wellness examination. Past Surgical History   has a past surgical history that includes Hysterectomy (); Brain tumor excision (); fracture surgery (Left, 2013); fracture surgery (Right); Upper gastrointestinal endoscopy (N/A, 10/22/2020); Colonoscopy (N/A, 10/22/2020); Endoscopic ultrasonography, GI (N/A, 2020); Upper gastrointestinal endoscopy (N/A, 2021); Hand surgery; CT ABSCESS DRAINAGE (2021); ERCP (2021); ERCP (N/A, 2021); ERCP (2021); Upper gastrointestinal endoscopy (N/A, 2021); Spine surgery (N/A, 09/10/2021); ERCP (10/21/2021); ERCP (N/A, 10/21/2021); ERCP (10/21/2021); ERCP (10/21/2021); Colonoscopy (N/A, 2021); and Kyphosis surgery. Social History   reports that she has been smoking cigarettes. She has a 30.00 pack-year smoking history. She has never used smokeless tobacco.   reports previous alcohol use. reports previous drug use. Frequency: 2.00 times per week. Marital Status single  Children none  Occupation does not currently work  Family History  Family Status   Relation Name Status    Father          peripheral artery disease    MGM  (Not Specified)    PGM  (Not Specified)    MAunt  (Not Specified)    Mother  Alive     family history includes Arthritis in her mother; Cancer in her maternal grandmother; Esophageal Cancer in her maternal aunt; Heart Disease in her paternal grandmother; Other in her father.     Review of Systems:  CONSTITUTIONAL:   negative for fevers, chills, fatigue and malaise    EYES:   negative for double vision, blurred vision and photophobia    HEENT:   negative for tinnitus, epistaxis and sore throat     RESPIRATORY:   negative for cough, shortness of breath, wheezing     CARDIOVASCULAR:   negative for chest pain, palpitations, syncope, edema     GASTROINTESTINAL:   negative for nausea, vomiting history of dysphagia, constipation   GENITOURINARY:   negative for incontinence     MUSCULOSKELETAL:   negative for neck or back pain     NEUROLOGICAL:   Negative for weakness and tingling  negative for headaches and dizziness     PSYCHIATRIC:   negative for anxiety       OBJECTIVE:   VITALS:  height is 5' 5\" (1.651 m) and weight is 140 lb (63.5 kg). Her temporal temperature is 97.9 °F (36.6 °C). Her blood pressure is 155/91 (abnormal) and her pulse is 86. Her respiration is 18 and oxygen saturation is 91%. CONSTITUTIONAL:alert & oriented x 3, no acute distress. Calm and pleasant. SKIN:  Warm and dry, no rashes to exposed areas of skin. HEAD:  Normocephalic, atraumatic. EYES: PERRL. EOMs intact. Wearing glasses. EARS:  Intact and equal bilaterally. No edema or thickening, without lumps, lesions, or discharge. Hearing grossly WNL. NOSE:  Nares patent. No rhinorrhea. MOUTH/THROAT:  Mucous membranes pink and moist, uvula midline, teeth appear to be intact, several missing, states she wears partial upper dentures. NECK: Supple, no lymphadenopathy. LUNGS: Respirations even and non-labored. Mild expiratory wheezes to bilateral lower lobes. CARDIOVASCULAR: Regular rate and rhythm, no murmurs/rubs/gallops. ABDOMEN: soft, non-tender and non-distended, bowel sounds active x 4. EXTREMITIES: No edema to bilateral lower extremities. No varicosities to bilateral lower extremities. NEUROLOGIC: CN II-XII are grossly intact. Gait not assessed. IMPRESSIONS:   Anal and perianal warts. PLANS:   EXCISION AND FULGURATION, ANAL AND PERIANAL WARTS WITH CO2 LASER.     LOI Schmidt CNP   Electronically signed 1/25/2022 at 12:07 PM

## 2022-01-25 NOTE — H&P
General Surgery:  H&P        PATIENT NAME: Lily Caballero OF BIRTH: 1970    ADMISSION DATE: No admission date for patient encounter. Admitting Provider: [unfilled]    TODAY'S DATE: 1/25/2022    Chief Complaint:  Genital/anorectal condyloma    HISTORY OF PRESENT ILLNESS:  The patient is a 45 y.o. female w/ PMHx of GERD/gastroparesis, chronic pancreatitis who presents today for condyloma resection. Seen by GI recently and referred to colorectal for resection. Patient states she has had them for years \"and has let them go for a long time\". Asking about pain medications and topical creams to use post operatively.      Past Medical History:        Diagnosis Date    Acute respiratory failure with hypoxia ([address] 10/16/2020    Alcohol withdrawal syndrome, with delirium ([address] 12/14/2019    Alcoholism ([address]     Anal warts     Anemia 10/2020    GI bleed    Anxiety     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24, the patient underwent 2 surgical resections without known recurrence 10/23/2020    at age 24    Closed fracture of lateral portion of left tibial plateau 03/62/0679    COPD (chronic obstructive pulmonary disease) ([address]     CO2 retainer, on Bipap at night for this, Dr. Cristiane Latham ( last visit 11/20/2020 and note on chart )    Depression      major depressive disorder, ptsd, anxiety    Dysphagia     GI bleed 10/2020    Hypertension     Memory loss     Oxygen dependent     USES AT NIGHT - 3L/MIN    Pain, joint, ankle and foot     Pancreatic lesion 10/2020    Dr. Glory Moctezuma working up pt    Perianal wart     Peripheral neuropathy     Seizures ([address]     LAST SEIZURE 3/2020 - FEELS IT WAS FROM ALCOHOL WITHDRAWL    Tension headache     Under care of team 09/29/2021    neuro-Dr Coyle[facility]-last visit sep 2021    Under care of team 09/29/2021    pain management-Hamzah jimenez-last visit sep 2021    Under care of team 09/29/2021    pulmonology-Dr [facility]-due for visit oct 26/2021    Under care of team 09/29/2021    psych-bahnfeldt NP-telemed-last visit 10/ 2021    Under care of team 09/29/2021    [address]-last visit aug 2021    Under care of team     NEPHROLOGY - DR. CHAPMAN     Wears glasses     Wears partial dentures     UPPER    Wellness examination 09/29/2021    pcp-Ludivina grimes-last visit 2021       Past Surgical History:        Procedure Laterality Date    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    COLONOSCOPY N/A 10/22/2020    COLONOSCOPY DIAGNOSTIC performed by Francesca Turcios MD at [facility]    COLONOSCOPY N/A 11/19/2021    COLONOSCOPY W/ ENDOSCOPIC MUCOSAL RESECTION performed by Francesca Turcios MD at [address] [place] [address]  07/28/2021    CT ABSCESS DRAIN SUBCUTANEOUS 7/28/2021 [facility] CT SCAN    ENDOSCOPIC ULTRASOUND (LOWER) N/A 12/09/2020    ENDOSCOPIC ULTRASOUND, UPPER WITH LINEAR SCOPE FOR BIOPSY OF MASS ON HEAD OF PANCREAS performed by Delfino Romero MD at [address] ERCP  08/11/2021    ERCP N/A 08/11/2021    ERCP STENT INSERTION performed by Loyda Singh MD at [facility]    ERCP  08/11/2021    ERCP SPHINCTER/PAPILLOTOMY performed by Loyda Singh MD at [facility]    ERCP  10/21/2021    ERCP STONE REMOVAL    ERCP N/A 10/21/2021    ERCP STENT REMOVAL/EXCHANGE performed by Loyda Singh MD at [address] [place] [address] ERCP  10/21/2021    ERCP STONE REMOVAL performed by Loyda Singh MD at [address] [place] [address] ERCP  10/21/2021    ERCP ENDOSCOPIC RETROGRADE CHOLANGIOPANCREATOGRAPHY DIRECT VISUALIZATION performed by Loyda Singh MD at [address] Left 07/03/2013    ORIF tibial plateau    FRACTURE SURGERY Right     small finger metacarpal fracture    HAND SURGERY      HYSTERECTOMY  2003    KYPHOSIS SURGERY      SPINE SURGERY N/A 09/10/2021    KYPHOPLASTY lumbar L5 performed by Kurtis Zarate MD at [address] [place] [address] 10/22/2020    EGD BIOPSY performed by Jeanmarie Mckenna MD at [address] [place] [address] N/A 04/05/2021    EGD BIOPSY performed by Jeanmarie Mckenna MD at [address] [place] [address] N/A 09/08/2021    ENDOSCOPIC ULTRASOUND, LINEAR SCOPE performed by Suly Dallas MD at [address] [place] [address]       Medications Prior to Admission:   No medications prior to admission. Allergies:  Patient has no known allergies. Social History:   Social History     Socioeconomic History    Marital status: Single     Spouse name: Not on file    Number of children: Not on file    Years of education: Not on file    Highest education level: Not on file   Occupational History    Not on file   Tobacco Use    Smoking status: Current Every Day Smoker     Packs/day: 1.00     Years: 30.00     Pack years: 30.00     Types: Cigarettes    Smokeless tobacco: Never Used   Vaping Use    Vaping Use: Never used   Substance and Sexual Activity    Alcohol use: Not Currently     Alcohol/week: 0.0 standard drinks    Drug use: Not Currently     Frequency: 2.0 times per week     Comment: 6 months ago    Sexual activity: Not Currently   Other Topics Concern    Not on file   Social History Narrative    Not on file     Social Determinants of Health     Financial Resource Strain: Medium Risk    Difficulty of Paying Living Expenses: Somewhat hard   Food Insecurity: No Food Insecurity    Worried About Running Out of Food in the Last Year: Never true    [place] of Food in the Last Year: Never true   Transportation Needs:     Lack of Transportation (Medical): Not on file    Lack of Transportation (Non-Medical):  Not on file   Physical Activity:     Days of Exercise per Week: Not on file    Minutes of Exercise per Session: Not on file   Stress:     Feeling of Stress : Not on file   Social Connections:     Frequency of Communication with Friends and Family: Not on file    Frequency of Social Gatherings with Friends and Family: Not on file    Attends Mu-ism Services: Not on file    Active Member of Clubs or Organizations: Not on file    Attends Club or Organization Meetings: Not on file    Marital Status: Not on file   Intimate Partner Violence:     Fear of Current or Ex-Partner: Not on file    Emotionally Abused: Not on file    Physically Abused: Not on file    Sexually Abused: Not on file   Housing Stability:     Unable to Pay for Housing in the Last Year: Not on file    Number of [place] in the Last Year: Not on file    Unstable Housing in the Last Year: Not on file       Family History:       Problem Relation Age of Onset    Other Father     Cancer Maternal Grandmother     Heart Disease Paternal Grandmother     Esophageal Cancer Maternal Aunt     Arthritis Mother        REVIEW OF SYSTEMS:    CONSTITUTIONAL:  No recent weight gain/loss. Energy level normal for pt. HEENT:  No nasal congestion or drainage. CARDIOVASCULAR:  No chest pain  GASTROINTESTINAL:  No abdominal pain, nausea, or vomiting. No constipation/diarrhea. No rectal bleeding  GENITOURINARY:  No dysuria  HEMATOLOGIC/LYMPHATIC:  No easy bruising. No history of cancer  ENDOCRINE: negative  Review of systems negative unless listed above.     PHYSICAL EXAM:    VITALS:  Ht 5' 5\" (1.651 m)   Wt 140 lb (63.5 kg)   BMI 23.30 kg/m²   INTAKE/OUTPUT:   No intake or output data in the 24 hours ending 01/25/22 0908    CONSTITUTIONAL:  awake, alert, not distressed and normal weight  ENT:  normocephalic/atraumatic, without obvious abnormality  NECK:  supple, symmetrical, trachea midline   LUNGS:  Equal chest rise bilaterally   CARDIOVASCULAR:  regular rate and rhythm  ABDOMEN:  soft, non-distended, non-tender to palpation,  MUSCULOSKELETAL:  negative, there is not obvious somatic dysfunction  NEUROLOGIC:  Mental Status Exam:  Level of Alertness:   alert  Orientation:   oriented to person, place, and time    Pertinent Radiology:   No results found.      ASSESSMENT:  There are no active hospital problems to display for this patient. 45 y.o. female with anorectal genital condyloma      Plan:  1. To OR today for perianal condyloma resection. 2. Discharge to home post op  3.  Follow up as outpatient     Electronically signed by Josef Orellana DO  on 1/25/2022 at 9:08 AM

## 2022-01-25 NOTE — ANESTHESIA POSTPROCEDURE EVALUATION
Department of Anesthesiology  Postprocedure Note    Patient: Vivian Jane  MRN: [id number]  YOB: 1970  Date of evaluation: 1/25/2022  Time:  6:31 PM     Procedure Summary     Date: 01/25/22 Room / Location: [address]    Anesthesia Start: 1331 Anesthesia Stop: 5628    Procedure: EXCISION AND FULGURATION, ANAL, VAGINAL AND PERIANAL WARTS WITH CO2 LASER, FORTEC (N/A Anus) Diagnosis: (EXTENSIVE ANAL AND PERIANAL WARTS)    Surgeons: Abner Muñoz MD Responsible Provider: Aurelia Pederson MD    Anesthesia Type: general ASA Status: 4          Anesthesia Type: general    Marcell Phase I: Marcell Score: 9    Marcell Phase II:      Last vitals: Reviewed and per EMR flowsheets.    POST-OP ANESTHESIA NOTE       BP (!) 149/107   Pulse 76   Temp 97 °F (36.1 °C)   Resp 20   Ht 5' 5\" (1.651 m)   Wt 140 lb (63.5 kg)   SpO2 92%   BMI 23.30 kg/m²    Pain Assessment: 0-10  Pain Level: 0       Anesthesia Post Evaluation    Patient location during evaluation: PACU  Patient participation: complete - patient participated  Level of consciousness: awake  Pain score: 0  Airway patency: patent  Nausea & Vomiting: no vomiting and no nausea  Complications: no  Cardiovascular status: hemodynamically stable  Respiratory status: acceptable  Hydration status: stable

## 2022-01-25 NOTE — H&P (VIEW-ONLY)
History and Physical    Pt Name: Kady Dutta  MRN: [id number]  YOB: 1970  Date of evaluation: 1/25/2022  Primary Care Physician: Jez Garcia MD    SUBJECTIVE:   History of Chief Complaint:    Kady Dutta is a 45 y.o. female who is scheduled today for EXCISION AND FULGURATION, ANAL AND PERIANAL WARTS WITH CO2 LASER. Patient has a history of pancreatitis, constipation, and dysphagia. Recent colonoscopy revealed condyoma to anal and perianal region. Patient denies any symptoms at this time. Allergies  has No Known Allergies. Medications  Prior to Admission medications    Medication Sig Start Date End Date Taking?  Authorizing Provider   sucralfate (CARAFATE) 1 GM tablet Take 1 tablet by mouth 4 times daily 1/21/22  Yes Francesca Turcios MD   sodium chloride 1 g tablet TAKE ONE TABLET BY MOUTH THREE TIMES A DAY  Patient taking differently: Take 1 g by mouth 4 times daily  1/20/22  Yes Maris Singletary MD   escitalopram (LEXAPRO) 10 MG tablet Take 1.5 tablets by mouth daily 1/11/22  Yes LOI Peck NP   chlorproMAZINE (THORAZINE) 10 MG tablet Take 1 tablet by mouth 3 times daily as needed (anxiety) 1/11/22  Yes LOI Peck NP   busPIRone (BUSPAR) 15 MG tablet Take 15 mg by mouth three times daily 1/11/22  Yes LOI Peck NP   prazosin (MINIPRESS) 1 MG capsule Take 1 capsule by mouth nightly 1/11/22  Yes LOI Peck NP   mirtazapine (REMERON) 7.5 MG tablet Take 1 tablet by mouth nightly as needed (sleep) 1/11/22  Yes LOI Peck NP   carBAMazepine (TEGRETOL) 200 MG tablet TAKE ONE TABLET BY MOUTH THREE TIMES A DAY 1/10/22  Yes Ludivina Hernandez MD   metoclopramide (REGLAN) 5 MG tablet TAKE ONE TABLET BY MOUTH THREE TIMES A DAY 1/10/22  Yes Francesca Turcios MD   rOPINIRole (REQUIP) 1 MG tablet TAKE ONE TABLET BY MOUTH ONCE NIGHTLY 1/5/22  Yes Idelia Kussmaul, MD   topiramate (TOPAMAX) 50 MG tablet Take 1.5 tab bid for 2 wk, then two tab bid  Patient taking differently: Take 50 mg by mouth 3 times daily Take 1.5 tab bid for 2 wk, then two tab bid 12/22/21  Yes Lucy Mcgrath MD   CREON 60017-05411 units delayed release capsule TAKE ONE CAPSULE BY MOUTH THREE TIMES A DAY WITH MEALS 12/20/21  Yes Ludivina Hayden MD   omeprazole (PRILOSEC) 40 MG delayed release capsule TAKE ONE CAPSULE BY MOUTH TWICE A DAY 12/17/21  Yes Bandar Day MD   fluticasone-umeclidin-vilant (TRELEGY ELLIPTA) 163-22.8-71 MCG/INH AEPB Inhale 1 puff into the lungs daily 11/1/21  Yes Ludivina Hayden MD   denosumab (PROLIA) 60 MG/ML SOSY SC injection Inject 1 mL into the skin every 6 months 11/1/21  Yes Ludivina Hayden MD   pregabalin (LYRICA) 200 MG capsule Take 1 capsule by mouth 3 times daily for 180 days.  10/9/21 4/7/22 Yes Carlos [address], MD   amLODIPine (NORVASC) 5 MG tablet TAKE ONE TABLET BY MOUTH DAILY 10/6/21  Yes Ludivina Hayden MD   simethicone (MYLICON) 80 MG chewable tablet Take 1 tablet by mouth 4 times daily as needed for Flatulence 8/27/21  Yes Bandar Day MD   albuterol sulfate HFA (VENTOLIN HFA) 108 (90 Base) MCG/ACT inhaler Inhale 2 puffs into the lungs 4 times daily as needed for Wheezing 6/11/20  Yes Ludviina Hayden MD   vitamin D (ERGOCALCIFEROL) 03305 units CAPS capsule Take 1 capsule by mouth once a week 6/19/19  Yes Ludivina Hayden MD   folic acid (L[place]) 1 MG tablet Take 1 tablet by mouth daily 6/19/19  Yes Mattie Lee MD     Past Medical History    has a past medical history of Acute respiratory failure with hypoxia ([address] Alcohol withdrawal syndrome, with delirium ([address] Alcoholism ([address] Anal warts, Anemia, Anxiety, Astrocytoma ([address] - 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24, the patient underwent 2 surgical resections without known recurrence, Closed fracture of lateral portion of left tibial plateau, COPD (chronic obstructive pulmonary disease) ([facility] Depression, Dysphagia, GI bleed, Hypertension, Memory loss, Oxygen dependent, Pain, for cough, shortness of breath, wheezing     CARDIOVASCULAR:   negative for chest pain, palpitations, syncope, edema     GASTROINTESTINAL:   negative for nausea, vomiting history of dysphagia, constipation   GENITOURINARY:   negative for incontinence     MUSCULOSKELETAL:   negative for neck or back pain     NEUROLOGICAL:   Negative for weakness and tingling  negative for headaches and dizziness     PSYCHIATRIC:   negative for anxiety       OBJECTIVE:   VITALS:  height is 5' 5\" (1.651 m) and weight is 140 lb (63.5 kg). Her temporal temperature is 97.9 °F (36.6 °C). Her blood pressure is 155/91 (abnormal) and her pulse is 86. Her respiration is 18 and oxygen saturation is 91%. CONSTITUTIONAL:alert & oriented x 3, no acute distress. Calm and pleasant. SKIN:  Warm and dry, no rashes to exposed areas of skin. HEAD:  Normocephalic, atraumatic. EYES: PERRL. EOMs intact. Wearing glasses. EARS:  Intact and equal bilaterally. No edema or thickening, without lumps, lesions, or discharge. Hearing grossly WNL. NOSE:  Nares patent. No rhinorrhea. MOUTH/THROAT:  Mucous membranes pink and moist, uvula midline, teeth appear to be intact, several missing, states she wears partial upper dentures. NECK: Supple, no lymphadenopathy. LUNGS: Respirations even and non-labored. Mild expiratory wheezes to bilateral lower lobes. CARDIOVASCULAR: Regular rate and rhythm, no murmurs/rubs/gallops. ABDOMEN: soft, non-tender and non-distended, bowel sounds active x 4. EXTREMITIES: No edema to bilateral lower extremities. No varicosities to bilateral lower extremities. NEUROLOGIC: CN II-XII are grossly intact. Gait not assessed. IMPRESSIONS:   Anal and perianal warts. PLANS:   EXCISION AND FULGURATION, ANAL AND PERIANAL WARTS WITH CO2 LASER.     LOI Middleton CNP   Electronically signed 1/25/2022 at 12:07 PM

## 2022-01-25 NOTE — OP NOTE
Operative Note      Patient: Yuliet Beverly  YOB: 1970  MRN: [id number]    Date of Procedure: 1/25/2022    Pre-Op Diagnosis: EXTENSIVE ANAL AND PERIANAL WARTS    Post-Op Diagnosis: Same       Procedure(s):  EXCISION AND FULGURATION, ANAL, VAGINAL AND PERIANAL WARTS WITH CO2 LASER, FORTEC    Surgeon(s):  Seb Cano MD    Assistant:   Resident: Reina Hinojosa DO    Anesthesia: General    Estimated Blood Loss (mL): Under 90BE    Complications: None    Specimens:   ID Type Source Tests Collected by Time Destination   A : CONDYLOMA WARTS Tissue Anus SURGICAL PATHOLOGY Lorene Mercado MD 1/25/2022 1357        Implants:  * No implants in log *      Drains: * No LDAs found *    Findings: extensive vulvar, anal, and perianal warts    Detailed Description of Procedure: The patient was brought to the operative suite. A time out was made to verify correct patient, site of procedure and procedure type. EPC cuffs were placed for DVT prophylaxis. General anesthesia was given and the patient was intubated with an ET tube. The patient was then transferred to the operative table and placed in a prone position. She was prepped and draped in a sterile fashion with betadine solution. Additional wet towels were placed around the area. The anus and perianal were examined and extensive condyloma warts were noted extending from inside of the anus to the perianal area and across the vulva. Local anesthesia and hemostasis were obtained using 0.25% marcaine with epinephrine. The perianal condylomata were removed using the CO2 laser via excision and fulguration. A rectal retractor was used to visualize the internal warts and then the CO2 laser was again used for fulguration. Hemostasis was achieved via CO2 laser fulguration and Bovie cautery. Exparel was injected as a local anesthetic to the area. Bacitracin and gauze were applied to the anus and perianal area.  The patient was then placed back onto her hospital bed in a

## 2022-01-27 LAB — SURGICAL PATHOLOGY REPORT: NORMAL

## 2022-01-28 ENCOUNTER — HOSPITAL ENCOUNTER (OUTPATIENT)
Dept: PSYCHIATRY | Age: 52
Setting detail: THERAPIES SERIES
Discharge: HOME | End: 2022-01-28
Payer: MEDICARE

## 2022-01-28 PROCEDURE — 90837 PSYTX W PT 60 MINUTES: CPT | Performed by: SOCIAL WORKER

## 2022-01-28 NOTE — PSYCHOTHERAPY
TELEHEALTH EVALUATION -- Audio/Visual (During ZAIAF-34 public health emergency)     [facility] Therapy Note  MARVIN Hussein   1/28/2022  10:00 AM  Travon Louise  1970  [id number]    Patient location is at their home address. Location of clinician is at [address] [place] [address] located at [address] [place] [address]. Time spent with Patient: 60 minutes      Pt was provided informed consent for the [facility]. Discussed with patient model of service to include the limits of confidentiality (i.e. abuse reporting, suicide intervention, etc.) and short-term intervention focused approach. Pt indicated understanding. S:  Pt and therapist met via telehealth as pt recently had surgery. Pt and therapist processed surgery and lab results, which came back as cancer free. Pt and therapist processed pt's use of coping skills. Pt reports that breathing does not really help, but using other mindfulness/grounding techniques have been helpful. Therapist offered psychoeducation on relaxation techniques and encouraged pt to continue daily practice of relaxation exercises. Pt and therapist explored pt's procrastination of some tasks, including putting away dishes, and problem solved ways to help pt achieve her task goals. Pt and therapist also discussed pt's \"inner voice\" and processed through what she says to herself. Pt and therapist used this analogy to explore pt's self-talk, motivation and pressure she feels from others. Pt and therapist discussed finding balance between having internal motivation and being kind to herself. Pt and therapist processed some of her past, especially her alcohol use, and engaged in Socratic Dialogue to address her thoughts and guilt about her past choices. Pt and therapist discussed pt being in survival mode and past experiences of being \"seen and not heard. \"  Pt and therapist processed how this affects her interactions with adult white males. Pt was able to identify a negative thought: \"Most adult males are full of shit and therefor I can't trust them. \"  Therapist offered pt psychoeducation on trauma and treatment approaches, and followed up with pt request by giving her new psych names. Therapist also gave pt a thought log to complete for next session. O:  MSE:     Appearance    alert, cooperative, crying, mild distress  Appetite normal  Sleep disturbance No  Fatigue Yes  Loss of pleasure No  Impulsive behavior No  Speech    normal rate, normal volume and well articulated  Mood    Anxious  Affect    Anxious  Thought Content    intact  Thought Process    linear  Associations    logical connections  Insight    Good  Judgment    Intact  Orientation    oriented to person, place, time, and general circumstances  Memory    recent and remote memory intact  Attention/Concentration    intact  Morbid ideation No  Suicide Assessment  No suicidal ideation    A:  Pt presented to therapy as anxious but engaged. Pt was able to process her thoughts and feelings and displayed good insight by identifying a negative thought she is \"stuck on. \"  Pt was able to express her feelings and identify how she was affected by things in her past and current stressors. Pt's anxiety about her surgery has lessened since getting results back that she was cancer free.   Pt is struggling with her inner voice and identifies an internal conflict and guilt about her past.          Visit Diagnosis:   PTSD      History from Medical Record:        Diagnosis Date    Acute respiratory failure with hypoxia ([facility] 10/16/2020    Alcohol withdrawal syndrome, with delirium ([address] 12/14/2019    Alcoholism ([facility]     Anal warts     Anemia 10/2020    GI bleed    Anxiety     Astrocytoma ([facility] - 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24, the patient underwent 2 surgical resections without known recurrence 10/23/2020    at age 24    Closed fracture of lateral portion of left tibial plateau 61/55/8713  Condyloma     COPD (chronic obstructive pulmonary disease) (formerly [facility])     CO2 retainer, on Bipap at night for this, Dr. Troy Velázquez ( last visit 11/20/2020 and note on chart )    Depression      major depressive disorder, ptsd, anxiety    Dysphagia     GI bleed 10/2020    Hypertension     Memory loss     Oxygen dependent     USES AT NIGHT - 3L/MIN    Pain, joint, ankle and foot     Pancreatic lesion 10/2020    Dr. Lux Peoples working up pt    Perianal wart     Peripheral neuropathy     Seizures ([facility]     LAST SEIZURE 3/2020 - FEELS IT WAS FROM ALCOHOL WITHDRAWL    Tension headache     Under care of team 09/29/2021    neuro-Dr [facility]-last visit sep 2021    Under care of team 09/29/2021    pain management-Hamzah Martines-[facility] jim[facility]-last visit sep 2021    Under care of team 09/29/2021    pulmonology-Dr Due[facility]as-[facility]-due for visit oct 26/2021    Under care of team 09/29/2021    psych-med NP-telemed-last visit 10/ 2021    Under care of team 09/29/2021    aw-[address]-last visit aug 2021    Under care of team     NEPHROLOGY - DR. CHAPMAN  Wears glasses     Wears partial dentures     UPPER    Wellness examination 09/29/2021    pcp-Ludivina grimes-last visit 2021     Medications:   Current Outpatient Medications   Medication Sig Dispense Refill    oxyCODONE (ROXICODONE) 5 MG immediate release tablet Take 1 tablet by mouth every 6 hours as needed for Pain for up to 7 days. Intended supply: 5 days. Take lowest dose possible to manage pain 28 tablet 0    cyclobenzaprine (FLEXERIL) 10 MG tablet Take 1 tablet by mouth 3 times daily as needed for Muscle spasms 21 tablet 0    senna (SENOKOT) 8.6 MG tablet Take 1 tablet by mouth 2 times daily 60 tablet 11    lidocaine (XYLOCAINE) 5 % ointment Apply topically as needed.  30 g 1    sucralfate (CARAFATE) 1 GM tablet Take 1 tablet by mouth 4 times daily 120 tablet 3    sodium chloride 1 g tablet TAKE ONE TABLET BY MOUTH THREE TIMES A DAY (Patient taking differently: Take 1 g by mouth 4 times daily ) 90 tablet 11    escitalopram (LEXAPRO) 10 MG tablet Take 1.5 tablets by mouth daily 45 tablet 1    chlorproMAZINE (THORAZINE) 10 MG tablet Take 1 tablet by mouth 3 times daily as needed (anxiety) 90 tablet 1    busPIRone (BUSPAR) 15 MG tablet Take 15 mg by mouth three times daily 90 tablet 1    prazosin (MINIPRESS) 1 MG capsule Take 1 capsule by mouth nightly 30 capsule 3    mirtazapine (REMERON) 7.5 MG tablet Take 1 tablet by mouth nightly as needed (sleep) 30 tablet 3    carBAMazepine (TEGRETOL) 200 MG tablet TAKE ONE TABLET BY MOUTH THREE TIMES A DAY 90 tablet 5    metoclopramide (REGLAN) 5 MG tablet TAKE ONE TABLET BY MOUTH THREE TIMES A DAY 90 tablet 3    rOPINIRole (REQUIP) 1 MG tablet TAKE ONE TABLET BY MOUTH ONCE NIGHTLY 90 tablet 1    topiramate (TOPAMAX) 50 MG tablet Take 1.5 tab bid for 2 wk, then two tab bid (Patient taking differently: Take 50 mg by mouth 3 times daily Take 1.5 tab bid for 2 wk, then two tab bid) 120 tablet 3    CREON 90012-73615 units delayed release capsule TAKE ONE CAPSULE BY MOUTH THREE TIMES A DAY WITH MEALS 90 capsule 1    omeprazole (PRILOSEC) 40 MG delayed release capsule TAKE ONE CAPSULE BY MOUTH TWICE A DAY 60 capsule 2    fluticasone-umeclidin-vilant (TRELEGY ELLIPTA) 100-62.5-25 MCG/INH AEPB Inhale 1 puff into the lungs daily 1 each 5    denosumab (PROLIA) 60 MG/ML SOSY SC injection Inject 1 mL into the skin every 6 months 1 mL 1    pregabalin (LYRICA) 200 MG capsule Take 1 capsule by mouth 3 times daily for 180 days.  90 capsule 5    amLODIPine (NORVASC) 5 MG tablet TAKE ONE TABLET BY MOUTH DAILY 90 tablet 1    simethicone (MYLICON) 80 MG chewable tablet Take 1 tablet by mouth 4 times daily as needed for Flatulence 180 tablet 3    albuterol sulfate HFA (VENTOLIN HFA) 108 (90 Base) MCG/ACT inhaler Inhale 2 puffs into the lungs 4 times daily as needed for Wheezing 1 Inhaler 5    vitamin D (ERGOCALCIFEROL) 44282 units CAPS capsule Take 1 capsule by mouth once a week 12 capsule 1    folic acid (FOLVITE) 1 MG tablet Take 1 tablet by mouth daily 90 tablet 1     No current facility-administered medications for this encounter. Social History:   Social History     Socioeconomic History    Marital status: Single     Spouse name: Not on file    Number of children: Not on file    Years of education: Not on file    Highest education level: Not on file   Occupational History    Not on file   Tobacco Use    Smoking status: Current Every Day Smoker     Packs/day: 1.00     Years: 30.00     Pack years: 30.00     Types: Cigarettes    Smokeless tobacco: Never Used   Vaping Use    Vaping Use: Never used   Substance and Sexual Activity    Alcohol use: Not Currently     Alcohol/week: 0.0 standard drinks    Drug use: Not Currently     Frequency: 2.0 times per week     Comment: 6 months ago    Sexual activity: Not Currently   Other Topics Concern    Not on file   Social History Narrative    Not on file     Social Determinants of Health     Financial Resource Strain: Medium Risk    Difficulty of Paying Living Expenses: Somewhat hard   Food Insecurity: No Food Insecurity    Worried About Running Out of Food in the Last Year: Never true    [place] of Food in the Last Year: Never true   Transportation Needs:     Lack of Transportation (Medical): Not on file    Lack of Transportation (Non-Medical):  Not on file   Physical Activity:     Days of Exercise per Week: Not on file    Minutes of Exercise per Session: Not on file   Stress:     Feeling of Stress : Not on file   Social Connections:     Frequency of Communication with Friends and Family: Not on file    Frequency of Social Gatherings with Friends and Family: Not on file    Attends Sikh Services: Not on file    Active Member of Clubs or Organizations: Not on file    Attends Club or Organization Meetings: Not on file   [facility] Marital Status: Not on file   Intimate Partner Violence:     Fear of Current or Ex-Partner: Not on file    Emotionally Abused: Not on file    Physically Abused: Not on file    Sexually Abused: Not on file   Housing Stability:     Unable to Pay for Housing in the Last Year: Not on file    Number of [place] in the Last Year: Not on file    Unstable Housing in the Last Year: Not on file       TOBACCO:   reports that she has been smoking cigarettes. She has a 30.00 pack-year smoking history. She has never used smokeless tobacco.  ETOH:   reports previous alcohol use. Family History:   Family History   Problem Relation Age of Onset    Other Father     Cancer Maternal Grandmother     Heart Disease Paternal Grandmother     Esophageal Cancer Maternal Aunt     Arthritis Mother            Pt interventions:  Discussed and set plan for behavioral activation, Trained in relaxation strategies, Provided education, Provided education on PTSD symptoms and treatment options for evidence-based treatment (Cognitive Processing Therapy and Prolonged Exposure), Discussed and problem-solved barriers in adhering to behavioral change plan, Established rapport, Supportive techniques, CBT to target negative thoughts , Identified maladaptive thoughts, Explained relaxed breathing technique in detail and practiced this with pt in visit and Problem-solving re: completing tasks         PLAN:   Address stuck points  Continue problem solving   Address anxiety   Review pt's thought log     Patient scheduled to return on: Wed 2/2/22 at 2:00 PM     Were changes or additions made to the treatment plan today?   YES []   NO [x]  Noted changes:                Pursuant to the emergency declaration under the [facility] waiver authority and the [employer] Act, this Virtual Visit was conducted, with patient's consent, to reduce the patient's risk of exposure to COVID-19 and provide continuity of care for an established patient. Services were provided through a video synchronous discussion virtually to substitute for in-person clinic visit.

## 2022-02-02 ENCOUNTER — HOSPITAL ENCOUNTER (OUTPATIENT)
Dept: PSYCHIATRY | Age: 52
Setting detail: THERAPIES SERIES
Discharge: HOME | End: 2022-02-02
Payer: MEDICARE

## 2022-02-02 PROCEDURE — 90837 PSYTX W PT 60 MINUTES: CPT | Performed by: SOCIAL WORKER

## 2022-02-02 RX ORDER — HYDROXYZINE HYDROCHLORIDE 25 MG/1
25 TABLET, FILM COATED ORAL 3 TIMES DAILY PRN
COMMUNITY
End: 2022-05-13 | Stop reason: SDUPTHER

## 2022-02-03 NOTE — PSYCHOTHERAPY
TELEHEALTH EVALUATION -- Audio/Visual (During UXQVL-97 public health emergency)     [facility] Therapy Note  Jen Wang KEESHAMONIKA   2/3/2022   2:00 PM     Kady Dutta  1970  [id number]    Patient location is at their home address. Location of clinician is at [address] [place] [address] located at [address] [place] [address]. Time spent with Patient: 60 minutes      Pt was provided informed consent for the [facility]. Discussed with patient model of service to include the limits of confidentiality (i.e. abuse reporting, suicide intervention, etc.) and short-term intervention focused approach. Pt indicated understanding. S:    Pt and therapist engaged in a check in to process pt's reported anxiety. Therapist processed pt's concerns about her sister. Therapist used active listening and offered emotional support. Pt and therapist explained what pt meant by feeling \"anxious\" and she reports she is feeling it both emotionally and physically. Pt reports that she forgot to do her thought log and will work on it this week. Pt and therapist processed her habit of \"taking on\" other people's anxiety on top of her own. Therapist and pt engaged in CBT techniques to address pt's M and pt's taking responsibility for other people's emotions/happiness. Pt and therapist discussed different cognitive distortions including pt's use of jumping to conclusions. Therapist praised pt for insight and empowerment. Pt and therapist problem solved and processed situation with neighbor and practiced assertive communication. Pt to complete thought log for next week.       O:  MSE:     Appearance    alert, cooperative, mild distress  Appetite normal  Sleep disturbance No  Fatigue Yes  Loss of pleasure Yes  Impulsive behavior No  Speech    normal rate, normal volume and well articulated  Mood    anxious  Affect    anxiety  Thought Content    excessive preoccupations and cognitive distortions  Thought Process    linear, goal directed and coherent  Associations    logical connections  Insight    Good  Judgment    Intact  Orientation    oriented to person, place, time, and general circumstances  Memory    recent and remote memory intact  Attention/Concentration    intact  Morbid ideation No  Suicide Assessment    no suicidal ideation    A:  Pt self-identified as anxious when coming to session but was not anxious about therapy. Pt identified that she has been anxious about things going on in her life that she has to complete. Pt is less anxious about upcoming case for disability and that she is noticing less of her anxiety warning signs. Pt continues to address thoughts in therapy and outside of sessions. Pt identified when she was using a thinking error on her own and reframed her thought. Pt is still affected by her past traumas and is experiencing anxiety because of it. Pt struggles with being on her own and self-worth. Pt also experiences hypervigilance. Pt would benefit from finding patterns in thinking by using her thought log to identify more stuck points to work on.           Visit Diagnosis:   Post Traumatic Stress Disorder       History from Medical Record:        Diagnosis Date    Acute respiratory failure with hypoxia ([address] 10/16/2020    Alcohol withdrawal syndrome, with delirium ([address] 12/14/2019    Alcoholism ([address]     Anal warts     Anemia 10/2020    GI bleed    Anxiety     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24, the patient underwent 2 surgical resections without known recurrence 10/23/2020    at age 24    Closed fracture of lateral portion of left tibial plateau 27/32/3918    Condyloma     COPD (chronic obstructive pulmonary disease) ([address]     CO2 retainer, on Bipap at night for this, Dr. Bertha Copeland ( last visit 11/20/2020 and note on chart )    Depression      major depressive disorder, ptsd, anxiety    Dysphagia     GI bleed 10/2020    Hypertension     Memory loss     Oxygen dependent     USES  3L/MIN DAILY PRN AND NIGHTLY CONTINUOUSLY    Pain, joint, ankle and foot     Pancreatic lesion 10/2020    Dr. Horton Both working up pt    Perianal wart     Peripheral neuropathy     Seizures ([address]     LAST SEIZURE 3/2020 - FEELS IT WAS FROM ALCOHOL WITHDRAWL    Tension headache     Under care of team 09/29/2021    neuro-Dr Coyle-[facility]-last visit sep 2021    Under care of team 09/29/2021    pain management-Hamzah Martines-[facility]-last visit sep 2021    Under care of team     pulmonology-Dr [facility]-due for visit March 2022    Under care of team 09/29/2021    psych-bahnfeldt NP-telemed-last visit 10/ 2021    Under care of team 09/29/2021    xf-[facility]-last visit aug 2021    Under care of team     NEPHROLOGY - DR. LEE    Wears glasses     Wears partial dentures     UPPER    Wellness examination     pcp-Ludivina Grimm-[facility]-last visit Jan 5, 2022     Medications:   Current Outpatient Medications   Medication Sig Dispense Refill    hydrOXYzine (ATARAX) 25 MG tablet Take 25 mg by mouth 3 times daily as needed for Itching      cyclobenzaprine (FLEXERIL) 10 MG tablet Take 1 tablet by mouth 3 times daily as needed for Muscle spasms 21 tablet 0    senna (SENOKOT) 8.6 MG tablet Take 1 tablet by mouth 2 times daily 60 tablet 11    lidocaine (XYLOCAINE) 5 % ointment Apply topically as needed.  30 g 1    sucralfate (CARAFATE) 1 GM tablet Take 1 tablet by mouth 4 times daily 120 tablet 3    sodium chloride 1 g tablet TAKE ONE TABLET BY MOUTH THREE TIMES A DAY (Patient taking differently: Take 1 g by mouth 4 times daily ) 90 tablet 11    escitalopram (LEXAPRO) 10 MG tablet Take 1.5 tablets by mouth daily 45 tablet 1    chlorproMAZINE (THORAZINE) 10 MG tablet Take 1 tablet by mouth 3 times daily as needed (anxiety) 90 tablet 1    busPIRone (BUSPAR) 15 MG tablet Take 15 mg by mouth three times daily 90 tablet 1    prazosin (MINIPRESS) 1 MG capsule Take 1 capsule by mouth nightly 30 capsule 3    mirtazapine (REMERON) 7.5 MG tablet Take 1 tablet by mouth nightly as needed (sleep) 30 tablet 3    carBAMazepine (TEGRETOL) 200 MG tablet TAKE ONE TABLET BY MOUTH THREE TIMES A DAY 90 tablet 5    metoclopramide (REGLAN) 5 MG tablet TAKE ONE TABLET BY MOUTH THREE TIMES A DAY 90 tablet 3    rOPINIRole (REQUIP) 1 MG tablet TAKE ONE TABLET BY MOUTH ONCE NIGHTLY 90 tablet 1    topiramate (TOPAMAX) 50 MG tablet Take 1.5 tab bid for 2 wk, then two tab bid (Patient taking differently: Take 50 mg by mouth 2 times daily ) 120 tablet 3    CREON 52682-53274 units delayed release capsule TAKE ONE CAPSULE BY MOUTH THREE TIMES A DAY WITH MEALS 90 capsule 1    omeprazole (PRILOSEC) 40 MG delayed release capsule TAKE ONE CAPSULE BY MOUTH TWICE A DAY 60 capsule 2    fluticasone-umeclidin-vilant (TRELEGY ELLIPTA) 100-62.5-25 MCG/INH AEPB Inhale 1 puff into the lungs daily 1 each 5    denosumab (PROLIA) 60 MG/ML SOSY SC injection Inject 1 mL into the skin every 6 months 1 mL 1    pregabalin (LYRICA) 200 MG capsule Take 1 capsule by mouth 3 times daily for 180 days. 90 capsule 5    amLODIPine (NORVASC) 5 MG tablet TAKE ONE TABLET BY MOUTH DAILY 90 tablet 1    simethicone (MYLICON) 80 MG chewable tablet Take 1 tablet by mouth 4 times daily as needed for Flatulence 180 tablet 3    albuterol sulfate HFA (VENTOLIN HFA) 108 (90 Base) MCG/ACT inhaler Inhale 2 puffs into the lungs 4 times daily as needed for Wheezing 1 Inhaler 5    vitamin D (ERGOCALCIFEROL) 34171 units CAPS capsule Take 1 capsule by mouth once a week 12 capsule 1    folic acid (FOLVITE) 1 MG tablet Take 1 tablet by mouth daily 90 tablet 1     No current facility-administered medications for this encounter.        Social History:   Social History     Socioeconomic History    Marital status: Single     Spouse name: Not on file    Number of children: Not on file    Years of Not on file       TOBACCO:   reports that she has been smoking cigarettes. She has a 15.00 pack-year smoking history. She has never used smokeless tobacco.  ETOH:   reports previous alcohol use. Family History:   Family History   Problem Relation Age of Onset    Other Father     Cancer Maternal Grandmother     Heart Disease Paternal Grandmother     Esophageal Cancer Maternal Aunt     Arthritis Mother            Pt interventions:  Practiced assertive communication, Trained in improving communication skills, Provided education, Established rapport, Supportive techniques, CBT to target anxiety  and Identified maladaptive thoughts        PLAN:   Review Thought Log   Identify thinking patterns  Process thoughts       Patient scheduled to return on: Wed 2/9/22 at 2 PM     Were changes or additions made to the treatment plan today? YES []   NO [x]  Noted changes:                Pursuant to the emergency declaration under the [facility] waiver authority and the [employer] Act, this Virtual Visit was conducted, with patient's consent, to reduce the patient's risk of exposure to COVID-19 and provide continuity of care for an established patient. Services were provided through a video synchronous discussion virtually to substitute for in-person clinic visit.

## 2022-02-09 ENCOUNTER — ANESTHESIA (OUTPATIENT)
Dept: OPERATING ROOM | Age: 52
End: 2022-02-09
Payer: MEDICARE

## 2022-02-09 ENCOUNTER — ANESTHESIA EVENT (OUTPATIENT)
Dept: OPERATING ROOM | Age: 52
End: 2022-02-09
Payer: MEDICARE

## 2022-02-09 ENCOUNTER — HOSPITAL ENCOUNTER (OUTPATIENT)
Age: 52
Setting detail: OUTPATIENT SURGERY
Discharge: HOME | End: 2022-02-09
Attending: INTERNAL MEDICINE | Admitting: INTERNAL MEDICINE
Payer: MEDICARE

## 2022-02-09 ENCOUNTER — TELEPHONE (OUTPATIENT)
Dept: FAMILY MEDICINE CLINIC | Age: 52
End: 2022-02-09

## 2022-02-09 VITALS
BODY MASS INDEX: 23.32 KG/M2 | WEIGHT: 140 LBS | RESPIRATION RATE: 14 BRPM | DIASTOLIC BLOOD PRESSURE: 90 MMHG | HEIGHT: 65 IN | TEMPERATURE: 97.2 F | SYSTOLIC BLOOD PRESSURE: 161 MMHG | HEART RATE: 89 BPM | OXYGEN SATURATION: 92 %

## 2022-02-09 VITALS — DIASTOLIC BLOOD PRESSURE: 79 MMHG | OXYGEN SATURATION: 96 % | SYSTOLIC BLOOD PRESSURE: 134 MMHG

## 2022-02-09 LAB
GFR NON-AFRICAN AMERICAN: >60 ML/MIN
GFR SERPL CREATININE-BSD FRML MDRD: >60 ML/MIN
GFR SERPL CREATININE-BSD FRML MDRD: ABNORMAL ML/MIN/{1.73_M2}
GLUCOSE BLD-MCNC: 124 MG/DL (ref 74–100)
POC BUN: 9 MG/DL (ref 8–26)
POC CREATININE: 0.45 MG/DL (ref 0.51–1.19)

## 2022-02-09 PROCEDURE — 84520 ASSAY OF UREA NITROGEN: CPT

## 2022-02-09 PROCEDURE — 6360000002 HC RX W HCPCS: Performed by: NURSE ANESTHETIST, CERTIFIED REGISTERED

## 2022-02-09 PROCEDURE — 3700000001 HC ADD 15 MINUTES (ANESTHESIA): Performed by: INTERNAL MEDICINE

## 2022-02-09 PROCEDURE — 7100000001 HC PACU RECOVERY - ADDTL 15 MIN: Performed by: INTERNAL MEDICINE

## 2022-02-09 PROCEDURE — 2709999900 HC NON-CHARGEABLE SUPPLY: Performed by: INTERNAL MEDICINE

## 2022-02-09 PROCEDURE — 3609018500 HC EGD US SCOPE W/ADJACENT STRUCTURES: Performed by: INTERNAL MEDICINE

## 2022-02-09 PROCEDURE — 2500000003 HC RX 250 WO HCPCS: Performed by: NURSE ANESTHETIST, CERTIFIED REGISTERED

## 2022-02-09 PROCEDURE — 7100000000 HC PACU RECOVERY - FIRST 15 MIN: Performed by: INTERNAL MEDICINE

## 2022-02-09 PROCEDURE — 2580000003 HC RX 258: Performed by: NURSE ANESTHETIST, CERTIFIED REGISTERED

## 2022-02-09 PROCEDURE — 7100000011 HC PHASE II RECOVERY - ADDTL 15 MIN: Performed by: INTERNAL MEDICINE

## 2022-02-09 PROCEDURE — 2580000003 HC RX 258: Performed by: ANESTHESIOLOGY

## 2022-02-09 PROCEDURE — 82565 ASSAY OF CREATININE: CPT

## 2022-02-09 PROCEDURE — 3700000000 HC ANESTHESIA ATTENDED CARE: Performed by: INTERNAL MEDICINE

## 2022-02-09 PROCEDURE — 3609155700 HC EGD STENT REMOVAL: Performed by: INTERNAL MEDICINE

## 2022-02-09 PROCEDURE — 82947 ASSAY GLUCOSE BLOOD QUANT: CPT

## 2022-02-09 PROCEDURE — 43247 EGD REMOVE FOREIGN BODY: CPT | Performed by: INTERNAL MEDICINE

## 2022-02-09 PROCEDURE — 7100000010 HC PHASE II RECOVERY - FIRST 15 MIN: Performed by: INTERNAL MEDICINE

## 2022-02-09 PROCEDURE — 43259 EGD US EXAM DUODENUM/JEJUNUM: CPT | Performed by: INTERNAL MEDICINE

## 2022-02-09 RX ORDER — ONDANSETRON 2 MG/ML
4 INJECTION INTRAMUSCULAR; INTRAVENOUS
Status: DISCONTINUED | OUTPATIENT
Start: 2022-02-09 | End: 2022-02-09 | Stop reason: HOSPADM

## 2022-02-09 RX ORDER — VARENICLINE TARTRATE 1 MG/1
1 TABLET, FILM COATED ORAL 2 TIMES DAILY
Qty: 60 TABLET | Refills: 3 | Status: SHIPPED
Start: 2022-02-09 | End: 2022-04-05 | Stop reason: SINTOL

## 2022-02-09 RX ORDER — SODIUM CHLORIDE, SODIUM LACTATE, POTASSIUM CHLORIDE, CALCIUM CHLORIDE 600; 310; 30; 20 MG/100ML; MG/100ML; MG/100ML; MG/100ML
INJECTION, SOLUTION INTRAVENOUS CONTINUOUS
Status: DISCONTINUED | OUTPATIENT
Start: 2022-02-09 | End: 2022-02-09 | Stop reason: HOSPADM

## 2022-02-09 RX ORDER — OXYCODONE HYDROCHLORIDE AND ACETAMINOPHEN 5; 325 MG/1; MG/1
2 TABLET ORAL PRN
Status: DISCONTINUED | OUTPATIENT
Start: 2022-02-09 | End: 2022-02-09 | Stop reason: HOSPADM

## 2022-02-09 RX ORDER — FENTANYL CITRATE 50 UG/ML
INJECTION, SOLUTION INTRAMUSCULAR; INTRAVENOUS PRN
Status: DISCONTINUED | OUTPATIENT
Start: 2022-02-09 | End: 2022-02-09 | Stop reason: SDUPTHER

## 2022-02-09 RX ORDER — VARENICLINE TARTRATE 0.5 MG/1
.5-1 TABLET, FILM COATED ORAL SEE ADMIN INSTRUCTIONS
Qty: 57 TABLET | Refills: 0 | Status: SHIPPED
Start: 2022-02-09 | End: 2022-04-05 | Stop reason: SINTOL

## 2022-02-09 RX ORDER — ONDANSETRON 2 MG/ML
INJECTION INTRAMUSCULAR; INTRAVENOUS PRN
Status: DISCONTINUED | OUTPATIENT
Start: 2022-02-09 | End: 2022-02-09 | Stop reason: SDUPTHER

## 2022-02-09 RX ORDER — SODIUM CHLORIDE, SODIUM LACTATE, POTASSIUM CHLORIDE, CALCIUM CHLORIDE 600; 310; 30; 20 MG/100ML; MG/100ML; MG/100ML; MG/100ML
INJECTION, SOLUTION INTRAVENOUS CONTINUOUS PRN
Status: DISCONTINUED | OUTPATIENT
Start: 2022-02-09 | End: 2022-02-09 | Stop reason: SDUPTHER

## 2022-02-09 RX ORDER — DIPHENHYDRAMINE HYDROCHLORIDE 50 MG/ML
12.5 INJECTION INTRAMUSCULAR; INTRAVENOUS
Status: DISCONTINUED | OUTPATIENT
Start: 2022-02-09 | End: 2022-02-09 | Stop reason: HOSPADM

## 2022-02-09 RX ORDER — MORPHINE SULFATE 2 MG/ML
2 INJECTION, SOLUTION INTRAMUSCULAR; INTRAVENOUS EVERY 5 MIN PRN
Status: DISCONTINUED | OUTPATIENT
Start: 2022-02-09 | End: 2022-02-09 | Stop reason: HOSPADM

## 2022-02-09 RX ORDER — LIDOCAINE HYDROCHLORIDE 10 MG/ML
INJECTION, SOLUTION EPIDURAL; INFILTRATION; INTRACAUDAL; PERINEURAL PRN
Status: DISCONTINUED | OUTPATIENT
Start: 2022-02-09 | End: 2022-02-09 | Stop reason: SDUPTHER

## 2022-02-09 RX ORDER — ROCURONIUM BROMIDE 10 MG/ML
INJECTION, SOLUTION INTRAVENOUS PRN
Status: DISCONTINUED | OUTPATIENT
Start: 2022-02-09 | End: 2022-02-09 | Stop reason: SDUPTHER

## 2022-02-09 RX ORDER — DEXAMETHASONE SODIUM PHOSPHATE 10 MG/ML
INJECTION INTRAMUSCULAR; INTRAVENOUS PRN
Status: DISCONTINUED | OUTPATIENT
Start: 2022-02-09 | End: 2022-02-09 | Stop reason: SDUPTHER

## 2022-02-09 RX ORDER — FENTANYL CITRATE 50 UG/ML
25 INJECTION, SOLUTION INTRAMUSCULAR; INTRAVENOUS EVERY 5 MIN PRN
Status: DISCONTINUED | OUTPATIENT
Start: 2022-02-09 | End: 2022-02-09 | Stop reason: HOSPADM

## 2022-02-09 RX ORDER — OXYCODONE HYDROCHLORIDE AND ACETAMINOPHEN 5; 325 MG/1; MG/1
1 TABLET ORAL PRN
Status: DISCONTINUED | OUTPATIENT
Start: 2022-02-09 | End: 2022-02-09 | Stop reason: HOSPADM

## 2022-02-09 RX ORDER — PROPOFOL 10 MG/ML
INJECTION, EMULSION INTRAVENOUS PRN
Status: DISCONTINUED | OUTPATIENT
Start: 2022-02-09 | End: 2022-02-09 | Stop reason: SDUPTHER

## 2022-02-09 RX ORDER — LABETALOL HYDROCHLORIDE 5 MG/ML
5 INJECTION, SOLUTION INTRAVENOUS EVERY 10 MIN PRN
Status: DISCONTINUED | OUTPATIENT
Start: 2022-02-09 | End: 2022-02-09 | Stop reason: HOSPADM

## 2022-02-09 RX ADMIN — SODIUM CHLORIDE, POTASSIUM CHLORIDE, SODIUM LACTATE AND CALCIUM CHLORIDE: 600; 310; 30; 20 INJECTION, SOLUTION INTRAVENOUS at 10:39

## 2022-02-09 RX ADMIN — ROCURONIUM BROMIDE 30 MG: 10 INJECTION INTRAVENOUS at 10:48

## 2022-02-09 RX ADMIN — SUGAMMADEX 200 MG: 100 INJECTION, SOLUTION INTRAVENOUS at 11:08

## 2022-02-09 RX ADMIN — PHENYLEPHRINE HYDROCHLORIDE 100 MCG: 10 INJECTION INTRAVENOUS at 11:02

## 2022-02-09 RX ADMIN — PHENYLEPHRINE HYDROCHLORIDE 100 MCG: 10 INJECTION INTRAVENOUS at 11:04

## 2022-02-09 RX ADMIN — PROPOFOL 200 MG: 10 INJECTION, EMULSION INTRAVENOUS at 10:48

## 2022-02-09 RX ADMIN — SODIUM CHLORIDE, POTASSIUM CHLORIDE, SODIUM LACTATE AND CALCIUM CHLORIDE: 600; 310; 30; 20 INJECTION, SOLUTION INTRAVENOUS at 09:55

## 2022-02-09 RX ADMIN — LIDOCAINE HYDROCHLORIDE 50 MG: 10 INJECTION, SOLUTION EPIDURAL; INFILTRATION; INTRACAUDAL; PERINEURAL at 10:48

## 2022-02-09 RX ADMIN — FENTANYL CITRATE 25 MCG: 50 INJECTION, SOLUTION INTRAMUSCULAR; INTRAVENOUS at 10:54

## 2022-02-09 RX ADMIN — ONDANSETRON 4 MG: 2 INJECTION INTRAMUSCULAR; INTRAVENOUS at 11:10

## 2022-02-09 RX ADMIN — DEXAMETHASONE SODIUM PHOSPHATE 10 MG: 10 INJECTION INTRAMUSCULAR; INTRAVENOUS at 10:58

## 2022-02-09 ASSESSMENT — PULMONARY FUNCTION TESTS
PIF_VALUE: 21
PIF_VALUE: 21
PIF_VALUE: 22
PIF_VALUE: 0
PIF_VALUE: 27
PIF_VALUE: 26
PIF_VALUE: 26
PIF_VALUE: 2
PIF_VALUE: 21
PIF_VALUE: 0
PIF_VALUE: 4
PIF_VALUE: 1
PIF_VALUE: 2
PIF_VALUE: 21
PIF_VALUE: 25
PIF_VALUE: 25
PIF_VALUE: 21
PIF_VALUE: 24
PIF_VALUE: 29
PIF_VALUE: 2
PIF_VALUE: 2
PIF_VALUE: 1
PIF_VALUE: 1
PIF_VALUE: 23
PIF_VALUE: 0
PIF_VALUE: 21
PIF_VALUE: 19
PIF_VALUE: 35
PIF_VALUE: 1
PIF_VALUE: 2
PIF_VALUE: 2
PIF_VALUE: 16
PIF_VALUE: 2
PIF_VALUE: 28
PIF_VALUE: 24
PIF_VALUE: 28
PIF_VALUE: 0

## 2022-02-09 ASSESSMENT — PAIN SCALES - GENERAL
PAINLEVEL_OUTOF10: 0

## 2022-02-09 ASSESSMENT — LIFESTYLE VARIABLES: SMOKING_STATUS: 1

## 2022-02-09 ASSESSMENT — PAIN - FUNCTIONAL ASSESSMENT: PAIN_FUNCTIONAL_ASSESSMENT: 0-10

## 2022-02-09 NOTE — ANESTHESIA PRE PROCEDURE
Department of Anesthesiology  Preprocedure Note       Name:  Orly Rader   Age:  45 y.o.  :  1970                                          MRN:  [id number]         Date:  2022      Surgeon: Kiley Pelletier):  Katherene Primrose, MD    Procedure: Procedure(s):  ERCP ENDOSCOPIC RETROGRADE CHOLANGIOPANCREATOGRAPHY  ENDOSCOPIC ULTRASOUND, EGD - GI SCHEDULED    Department of Anesthesiology  Pre-Anesthesia Evaluation/Consultation         Name:  Orly Rader                                         Age:  45 y.o. MRN:  [id number]             Medications  No current facility-administered medications for this encounter.        No Known Allergies  Patient Active Problem List   Diagnosis    Acute bronchitis    Reflex sympathetic dystrophy of lower limb    Essential hypertension    Nicotine addiction    Psychophysiological insomnia    Anxiety    Alcoholic peripheral neuropathy (HCC)    Hypokalemia    Macrocytic anemia    Hypomagnesemia    Tobacco use    Ambulatory dysfunction    Seizure disorder ([address]    COPD with acute exacerbation ([address]    Paresthesia of lower extremity    Acute respiratory failure with hypoxia (HCC)    Pancreatic cyst    Recurrent major depressive disorder (HCC)    Elevated CA 19-9 level    Perineal mass, female    Esophagitis candidal     Condyloma    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24, the patient underwent 2 surgical resections without known recurrence    Alcohol use disorder, severe, in early remission ([address]    Metabolic encephalopathy    AMAN (generalized anxiety disorder)    Major depressive disorder, recurrent severe without psychotic features ([address]    Esophageal dysphagia    Chronic peripheral neuropathic pain    History of alcohol abuse    History of petit-mal seizures    Intractable episodic tension-type headache    Personal history of astrocytoma    Multilevel spine pain    Chronic pain syndrome    Marijuana abuse    Severe sepsis (HCC)    Closed fracture of fifth thoracic vertebra (HCC)    Diverticulitis of large intestine with abscess without bleeding    Elevated brain natriuretic peptide (BNP) level    Elevated alkaline phosphatase level    Chronic pancreatitis (HCC)    Erythema ab igne    Compression fracture of thoracic vertebra (HCC)    Pathological compression fracture of lumbar vertebra with delayed healing    Metabolic acidosis with increased anion gap and accumulation of organic acids    Community acquired pneumonia of left lower lobe of lung    Septicemia ([address]    Alcohol-induced acute pancreatitis    Fluid collection of pancreas    Diverticulitis of large intestine without perforation or abscess with bleeding    Pseudocyst of pancreas    Bandemia    Sepsis (HCC)    Acute recurrent pancreatitis    Atelectasis of left lung    Hyponatremia    Iron deficiency anemia    Adenomatous polyp of sigmoid colon    Moderate episode of recurrent major depressive disorder (HCC)     Past Medical History:   Diagnosis Date    Acute respiratory failure with hypoxia ([address] 10/16/2020    Alcohol withdrawal syndrome, with delirium ([address] 12/14/2019    Alcoholism ([address]     Anal warts     Anemia 10/2020    GI bleed    Anxiety     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24, the patient underwent 2 surgical resections without known recurrence 10/23/2020    at age 24    Closed fracture of lateral portion of left tibial plateau 91/44/5040    Condyloma     COPD (chronic obstructive pulmonary disease) ([address]     CO2 retainer, on Bipap at night for this, Dr. Luna Davis ( last visit 11/20/2020 and note on chart )    Depression      major depressive disorder, ptsd, anxiety    Dysphagia     GI bleed 10/2020    Hypertension     Memory loss     Oxygen dependent     USES  3L/MIN DAILY PRN AND NIGHTLY CONTINUOUSLY    Pain, joint, ankle and foot     Pancreatic lesion 10/2020    Dr. Carmona Washington working up pt    Perianal wart     Peripheral neuropathy     Seizures ([address]     LAST SEIZURE 3/2020 - FEELS IT WAS FROM ALCOHOL WITHDRAWL    Tension headache     Under care of team 09/29/2021    neuro-Dr [facility]-last visit sep 2021    Under care of team 09/29/2021    pain management-Hamzah Martines-[facility]-last visit sep 2021    Under care of team     pulmonology-Dr [facility]ñas[facility]-due for visit March 2022    Under care of team 09/29/2021    psych-bahnfeldt NP-telemed-last visit 10/ 2021    Under care of team 09/29/2021    rk-[address]-last visit aug 2021    Under care of team     NEPHROLOGY - DR. LEE    Wears glasses     Wears partial dentures     UPPER    Wellness examination     pcp-Ludivina Grimm-[facility] cornelio-last visit Jan 5, 2022     Past Surgical History:   Procedure Laterality Date    ANUS SURGERY N/A 01/25/2022    EXCISION AND FULGURATION, ANAL, VAGINAL AND PERIANAL WARTS WITH CO2 LASER, FORTEC performed by Shakila Lopez MD at [facility]    COLONOSCOPY N/A 10/22/2020    COLONOSCOPY DIAGNOSTIC performed by Tiffanie Lisa MD at [address] [place] [address] COLONOSCOPY N/A 11/19/2021    COLONOSCOPY W/ ENDOSCOPIC MUCOSAL RESECTION performed by Tiffanie Lisa MD at [address] [place] [address]  07/28/2021    CT ABSCESS DRAIN SUBCUTANEOUS 7/28/2021 [facility] CT SCAN    ENDOSCOPIC ULTRASOUND (LOWER) N/A 12/09/2020    ENDOSCOPIC ULTRASOUND, UPPER WITH LINEAR SCOPE FOR BIOPSY OF MASS ON HEAD OF PANCREAS performed by Chivo Bender MD at [facility] ENDO    ERCP N/A 08/11/2021    ERCP STENT INSERTION performed by Kinsey Cramer MD at [facility]    ERCP  08/11/2021    ERCP SPHINCTER/PAPILLOTOMY performed by Kinsey Cramer MD at [facility]    ERCP N/A 10/21/2021    ERCP STENT REMOVAL/EXCHANGE performed by Kinsey Cramer MD at [facility] ERCP  10/21/2021    ERCP STONE REMOVAL performed by Kinsey Cramer MD at [address] [place] [address] ERCP  10/21/2021    ERCP ENDOSCOPIC RETROGRADE CHOLANGIOPANCREATOGRAPHY DIRECT VISUALIZATION [facility] [address] 07/03/2013    ORIF tibial plateau    FRACTURE SURGERY Right     small finger metacarpal fracture    HAND SURGERY      HYSTERECTOMY  2003    SPINE SURGERY N/A 09/10/2021    KYPHOPLASTY lumbar L5 performed by Kaylen Paniagua MD at [address] 10/22/2020    EGD BIOPSY performed by Anastasia Morris MD at [address] [place] [address] N/A 04/05/2021    EGD BIOPSY performed by Anastasia Morris MD at [address] [place] [address] N/A 09/08/2021    ENDOSCOPIC ULTRASOUND, LINEAR SCOPE performed by Ángel Patel MD at [facility]     Social History     Tobacco Use    Smoking status: Current Every Day Smoker     Packs/day: 0.50     Years: 30.00     Pack years: 15.00     Types: Cigarettes    Smokeless tobacco: Never Used    Tobacco comment: was smoking 1 ppd   Vaping Use    Vaping Use: Never used   Substance Use Topics    Alcohol use: Not Currently     Alcohol/week: 0.0 standard drinks    Drug use: Not Currently     Frequency: 2.0 times per week     Comment: 6 months ago         Vital Signs (Current) There were no vitals filed for this visit. Vital Signs Statistics (for past 48 hrs)     No data recorded  BP Readings from Last 3 Encounters:   01/25/22 (!) 149/107   01/25/22 110/75   01/21/22 113/77       BMI  Body mass index is 23.3 kg/m².     CBC   Lab Results   Component Value Date    WBC 10.4 11/09/2021    RBC 4.45 11/09/2021    RBC 4.16 04/30/2012    HGB 12.7 11/09/2021    HCT 40.2 11/09/2021    MCV 90.3 11/09/2021    RDW 13.8 11/09/2021     11/09/2021     04/30/2012       CMP    Lab Results   Component Value Date     01/10/2022    K 4.4 01/10/2022     01/10/2022    CO2 21 01/10/2022    BUN 7 01/10/2022    CREATININE 0.53 01/10/2022    GFRAA >60 01/10/2022    LABGLOM >60 01/10/2022    GLUCOSE 74 01/10/2022    GLUCOSE 92 04/30/2012    PROT 7.3 10/12/2021    CALCIUM 9.7 01/10/2022    BILITOT 0.19 10/12/2021    ALKPHOS 211 10/12/2021    AST 14 10/12/2021    ALT 7 10/12/2021       BMP    Lab Results   Component Value Date     01/10/2022    K 4.4 01/10/2022     01/10/2022    CO2 21 01/10/2022    BUN 7 01/10/2022    CREATININE 0.53 01/10/2022    CALCIUM 9.7 01/10/2022    GFRAA >60 01/10/2022    LABGLOM >60 01/10/2022    GLUCOSE 74 01/10/2022    GLUCOSE 92 04/30/2012       POC Testing  No results for input(s): POCGLU, POCNA, POCK, POCCL, POCBUN, POCHEMO, POCHCT in the last 72 hours. Coags    Lab Results   Component Value Date    PROTIME 10.9 08/09/2021    INR 1.0 08/09/2021    APTT 24.6 08/09/2021       HCG (If Applicable) No results found for: PREGTESTUR, PREGSERUM, HCG, HCGQUANT     ABGs No results found for: PHART, PO2ART, RCX9AYX, WKL1HQI, BEART, X7FLJBWG     Type & Screen (If Applicable)  No results found for: LABABO, [address]    Radiology (If Applicable)    Cardiac Testing (If Applicable)     EKG (If Applicable) first degree,anteroseptal MI          Medications prior to admission:   Prior to Admission medications    Medication Sig Start Date End Date Taking?  Authorizing Provider   hydrOXYzine (ATARAX) 25 MG tablet Take 25 mg by mouth 3 times daily as needed for Itching   Yes Historical Provider, MD   senna (SENOKOT) 8.6 MG tablet Take 1 tablet by mouth 2 times daily 1/25/22 1/25/23 Yes Fidelina Velarde,    sodium chloride 1 g tablet TAKE ONE TABLET BY MOUTH THREE TIMES A DAY  Patient taking differently: Take 1 g by mouth 4 times daily  1/20/22  Yes Lindsey Stafford MD   escitalopram (LEXAPRO) 10 MG tablet Take 1.5 tablets by mouth daily 1/11/22  Yes LOI Bishop NP   chlorproMAZINE (THORAZINE) 10 MG tablet Take 1 tablet by mouth 3 times daily as needed (anxiety) 1/11/22  Yes LOI Bishop NP   busPIRone (BUSPAR) 15 MG tablet Take 15 mg by mouth three times daily 1/11/22 Yes Bedoya Cost, APRN - NP   prazosin (MINIPRESS) 1 MG capsule Take 1 capsule by mouth nightly 1/11/22  Yes Bedoya Cost, APRN - NP   mirtazapine (REMERON) 7.5 MG tablet Take 1 tablet by mouth nightly as needed (sleep) 1/11/22  Yes Bedoya Cost, APRN - NP   carBAMazepine (TEGRETOL) 200 MG tablet TAKE ONE TABLET BY MOUTH THREE TIMES A DAY 1/10/22  Yes Arti Elysa Kussmaul, MD   metoclopramide (REGLAN) 5 MG tablet TAKE ONE TABLET BY MOUTH THREE TIMES A DAY 1/10/22  Yes Rama Blackburn MD   rOPINIRole (REQUIP) 1 MG tablet TAKE ONE TABLET BY MOUTH ONCE NIGHTLY 1/5/22  Yes Arti Elysa Kussmaul, MD   topiramate (TOPAMAX) 50 MG tablet Take 1.5 tab bid for 2 wk, then two tab bid  Patient taking differently: Take 50 mg by mouth 2 times daily  12/22/21  Yes Yoselyn Case MD   CREON 58407-31569 units delayed release capsule TAKE ONE CAPSULE BY MOUTH THREE TIMES A DAY WITH MEALS 12/20/21  Yes Arti Elysa Kussmaul, MD   omeprazole (PRILOSEC) 40 MG delayed release capsule TAKE ONE CAPSULE BY MOUTH TWICE A DAY 12/17/21  Yes Rama Blackburn MD   fluticasone-umeclidin-vilant (TRELEGY ELLIPTA) 511-53.6-69 MCG/INH AEPB Inhale 1 puff into the lungs daily 11/1/21  Yes Arti Elysa Kussmaul, MD   pregabalin (LYRICA) 200 MG capsule Take 1 capsule by mouth 3 times daily for 180 days.  10/9/21 4/7/22 Yes Piedad Burden MD   amLODIPine (NORVASC) 5 MG tablet TAKE ONE TABLET BY MOUTH DAILY 10/6/21  Yes Arti Elysa Kussmaul, MD   simethicone (MYLICON) 80 MG chewable tablet Take 1 tablet by mouth 4 times daily as needed for Flatulence 8/27/21  Yes Rama Blackburn MD   albuterol sulfate HFA (VENTOLIN HFA) 108 (90 Base) MCG/ACT inhaler Inhale 2 puffs into the lungs 4 times daily as needed for Wheezing 6/11/20  Yes Arti Elysa Kussmaul, MD   vitamin D (ERGOCALCIFEROL) 89231 units CAPS capsule Take 1 capsule by mouth once a week 6/19/19  Yes Arti Elysa Kussmaul, MD   folic acid (FOLVITE) 1 MG tablet Take 1 tablet by mouth daily 6/19/19  Yes Ludivina Sonia Barrientos MD   lidocaine (XYLOCAINE) 5 % ointment Apply topically as needed. 1/25/22   Gee Salinas DO   sucralfate (CARAFATE) 1 GM tablet Take 1 tablet by mouth 4 times daily 1/21/22   Pauline Huynh MD   denosumab (PROLIA) 60 MG/ML SOSY SC injection Inject 1 mL into the skin every 6 months 11/1/21   Ludivina Sonia Barrientos MD       Current medications:    No current facility-administered medications for this encounter.      Current Outpatient Medications   Medication Sig Dispense Refill    hydrOXYzine (ATARAX) 25 MG tablet Take 25 mg by mouth 3 times daily as needed for Itching      senna (SENOKOT) 8.6 MG tablet Take 1 tablet by mouth 2 times daily 60 tablet 11    sodium chloride 1 g tablet TAKE ONE TABLET BY MOUTH THREE TIMES A DAY (Patient taking differently: Take 1 g by mouth 4 times daily ) 90 tablet 11    escitalopram (LEXAPRO) 10 MG tablet Take 1.5 tablets by mouth daily 45 tablet 1    chlorproMAZINE (THORAZINE) 10 MG tablet Take 1 tablet by mouth 3 times daily as needed (anxiety) 90 tablet 1    busPIRone (BUSPAR) 15 MG tablet Take 15 mg by mouth three times daily 90 tablet 1    prazosin (MINIPRESS) 1 MG capsule Take 1 capsule by mouth nightly 30 capsule 3    mirtazapine (REMERON) 7.5 MG tablet Take 1 tablet by mouth nightly as needed (sleep) 30 tablet 3    carBAMazepine (TEGRETOL) 200 MG tablet TAKE ONE TABLET BY MOUTH THREE TIMES A DAY 90 tablet 5    metoclopramide (REGLAN) 5 MG tablet TAKE ONE TABLET BY MOUTH THREE TIMES A DAY 90 tablet 3    rOPINIRole (REQUIP) 1 MG tablet TAKE ONE TABLET BY MOUTH ONCE NIGHTLY 90 tablet 1    topiramate (TOPAMAX) 50 MG tablet Take 1.5 tab bid for 2 wk, then two tab bid (Patient taking differently: Take 50 mg by mouth 2 times daily ) 120 tablet 3    CREON 33468-92571 units delayed release capsule TAKE ONE CAPSULE BY MOUTH THREE TIMES A DAY WITH MEALS 90 capsule 1    omeprazole (PRILOSEC) 40 MG delayed release capsule TAKE ONE CAPSULE BY MOUTH TWICE A DAY 60 capsule 2    fluticasone-umeclidin-vilant (TRELEGY ELLIPTA) 100-62.5-25 MCG/INH AEPB Inhale 1 puff into the lungs daily 1 each 5    pregabalin (LYRICA) 200 MG capsule Take 1 capsule by mouth 3 times daily for 180 days. 90 capsule 5    amLODIPine (NORVASC) 5 MG tablet TAKE ONE TABLET BY MOUTH DAILY 90 tablet 1    simethicone (MYLICON) 80 MG chewable tablet Take 1 tablet by mouth 4 times daily as needed for Flatulence 180 tablet 3    albuterol sulfate HFA (VENTOLIN HFA) 108 (90 Base) MCG/ACT inhaler Inhale 2 puffs into the lungs 4 times daily as needed for Wheezing 1 Inhaler 5    vitamin D (ERGOCALCIFEROL) 43948 units CAPS capsule Take 1 capsule by mouth once a week 12 capsule 1    folic acid (FOLVITE) 1 MG tablet Take 1 tablet by mouth daily 90 tablet 1    lidocaine (XYLOCAINE) 5 % ointment Apply topically as needed.  30 g 1    sucralfate (CARAFATE) 1 GM tablet Take 1 tablet by mouth 4 times daily 120 tablet 3    denosumab (PROLIA) 60 MG/ML SOSY SC injection Inject 1 mL into the skin every 6 months 1 mL 1       Allergies:  No Known Allergies    Problem List:    Patient Active Problem List   Diagnosis Code    Acute bronchitis J20.9    Reflex sympathetic dystrophy of lower limb G90.529    Essential hypertension I10    Nicotine addiction F17.200    Psychophysiological insomnia F51.04    Anxiety O53.0    Alcoholic peripheral neuropathy (HCC) G62.1    Hypokalemia E87.6    Macrocytic anemia D53.9    Hypomagnesemia E83.42    Tobacco use Z72.0    Ambulatory dysfunction R26.2    Seizure disorder ([facility] G40.909    COPD with acute exacerbation (HCC) J44.1    Paresthesia of lower extremity R20.2    Acute respiratory failure with hypoxia (HCC) J96.01    Pancreatic cyst K86.2    Recurrent major depressive disorder (HCC) F33.9    Elevated CA 19-9 level R97.8    Perineal mass, female N90.89    Esophagitis candidal  B37.81    Condyloma A63.0    Astrocytoma ([facility] - diagnosed at age 24, the patient underwent 2 surgical resections without known recurrence C71.9    Alcohol use disorder, severe, in early remission ([facility] M08.41    Metabolic encephalopathy C84.54    AMAN (generalized anxiety disorder) F41.1    Major depressive disorder, recurrent severe without psychotic features ([facility] F33.2    Esophageal dysphagia R13.19    Chronic peripheral neuropathic pain M79.2, G89.29    History of alcohol abuse F10.11    History of petit-mal seizures Z86.69    Intractable episodic tension-type headache G[address]. [address] history of astrocytoma Z85.841    Multilevel spine pain M54.9    Chronic pain syndrome G89.4    Marijuana abuse F12.10    Severe sepsis (HCC) A41.9, R65.20    Closed fracture of fifth thoracic vertebra (HCC) S22.059A    Diverticulitis of large intestine with abscess without bleeding K57.20    Elevated brain natriuretic peptide (BNP) level R79.89    Elevated alkaline phosphatase level R74.8    Chronic pancreatitis (HCC) K86.1    Erythema ab igne L59.0    Compression fracture of thoracic vertebra (HCC) S22.000A    Pathological compression fracture of lumbar vertebra with delayed healing G78.59YO    Metabolic acidosis with increased anion gap and accumulation of organic acids E87.2    Community acquired pneumonia of left lower lobe of lung J18.9    Septicemia (HCC) A41.9    Alcohol-induced acute pancreatitis K85.20    Fluid collection of pancreas K86.89    Diverticulitis of large intestine without perforation or abscess with bleeding K57.33    Pseudocyst of pancreas K86.3    Bandemia D72.825    Sepsis (HCC) A41.9    Acute recurrent pancreatitis K85.90    Atelectasis of left lung J98.11    Hyponatremia E87.1    Iron deficiency anemia D50.9    Adenomatous polyp of sigmoid colon D12.5    Moderate episode of recurrent major depressive disorder (HCC) F33.1       Past Medical History:        Diagnosis Date    Acute respiratory failure with hypoxia ([facility] 10/16/2020    Alcohol withdrawal syndrome, with delirium ([facility] 12/14/2019    Alcoholism ([facility]     Anal warts     Anemia 10/2020    GI bleed    Anxiety     Astrocytoma ([facility] - diagnosed at age 24, the patient underwent 2 surgical resections without known recurrence 10/23/2020    at age 24    Closed fracture of lateral portion of left tibial plateau 02/50/3797    Condyloma     COPD (chronic obstructive pulmonary disease) ([facility]     CO2 retainer, on Bipap at night for this, Dr. Richar Torres ( last visit 11/20/2020 and note on chart )    Depression      major depressive disorder, ptsd, anxiety    Dysphagia     GI bleed 10/2020    Hypertension     Memory loss     Oxygen dependent     USES  3L/MIN DAILY PRN AND NIGHTLY CONTINUOUSLY    Pain, joint, ankle and foot     Pancreatic lesion 10/2020    Dr. Jak Akers working up pt    Perianal wart     Peripheral neuropathy     Seizures ([facility]     LAST SEIZURE 3/2020 - FEELS IT WAS FROM ALCOHOL WITHDRAWL    Tension headache     Under care of team 09/29/2021    neuro-Dr Coyle-[facility] ct-last visit sep 2021    Under care of team 09/29/2021    pain management-Hamzah Martines-[facility] jim[facility]-last visit sep 2021    Under care of team     [facility] March 2022    Under care of team 09/29/2021    psych-bahnfeldt NP-telemed-last visit 10/ 2021    Under care of team 09/29/2021    [address]-last visit aug 2021    Under care of team     NEPHROLOGY - DR. LEE    Wears glasses     Wears partial dentures     UPPER    Wellness examination     [facility] Jan 5, 2022       Past Surgical History:        Procedure Laterality Date    ANUS SURGERY N/A 01/25/2022    EXCISION AND FULGURATION, ANAL, VAGINAL AND PERIANAL WARTS WITH CO2 LASER, FORTEC performed by Walter Arriola MD at [address]    astrocytoma times 2    COLONOSCOPY N/A 10/22/2020    COLONOSCOPY DIAGNOSTIC performed by Sally Self Pa Weaver MD at [facility]    COLONOSCOPY N/A 11/19/2021    COLONOSCOPY W/ ENDOSCOPIC MUCOSAL RESECTION performed by Chava Rosado MD at [facility]  07/28/2021    CT ABSCESS DRAIN SUBCUTANEOUS 7/28/2021 [facility] CT SCAN    ENDOSCOPIC ULTRASOUND (LOWER) N/A 12/09/2020    ENDOSCOPIC ULTRASOUND, UPPER WITH LINEAR SCOPE FOR BIOPSY OF MASS ON HEAD OF PANCREAS performed by Mark Arias MD at [address] [place] [address] ERCP N/A 08/11/2021    ERCP STENT INSERTION performed by Martha Costa MD at [facility]    ERCP  08/11/2021    ERCP SPHINCTER/PAPILLOTOMY performed by Martha Costa MD at [facility]    ERCP N/A 10/21/2021    ERCP STENT REMOVAL/EXCHANGE performed by Martha Costa MD at [address] [place] [address] ERCP  10/21/2021    ERCP STONE REMOVAL performed by Martha Costa MD at [address] [place] [address] ERCP  10/21/2021    ERCP ENDOSCOPIC RETROGRADE CHOLANGIOPANCREATOGRAPHY DIRECT VISUALIZATION performed by Martha Costa MD at [address] [place] [address] 07/03/2013    ORIF tibial plateau    FRACTURE SURGERY Right     small finger metacarpal fracture    HAND SURGERY      HYSTERECTOMY  2003    SPINE SURGERY N/A 09/10/2021    KYPHOPLASTY lumbar L5 performed by Saloni Bright MD at [place] [address] 10/22/2020    EGD BIOPSY performed by Chava Rosado MD at [address] [place] [address] N/A 04/05/2021    EGD BIOPSY performed by Chava Rosado MD at [address] [place] [address] N/A 09/08/2021    ENDOSCOPIC ULTRASOUND, LINEAR SCOPE performed by Mark Arias MD at [address] [place] [address]       Social History:    Social History     Tobacco Use    Smoking status: Current Every Day Smoker     Packs/day: 0.50     Years: 30.00     Pack years: 15.00     Types: Cigarettes    Smokeless tobacco: Never Used    Tobacco comment: was smoking 1 ppd   Substance Use Topics    Alcohol use: Not Currently     Alcohol/week: 0.0 standard drinks Ready to quit: Not Answered  Counseling given: Not Answered  Comment: was smoking 1 ppd      Vital Signs (Current):   Vitals:    02/02/22 1518   Weight: 140 lb (63.5 kg)   Height: 5' 5\" (1.651 m)                                              BP Readings from Last 3 Encounters:   01/25/22 (!) 149/107   01/25/22 110/75   01/21/22 113/77       NPO Status:  MN                                                                               BMI:   Wt Readings from Last 3 Encounters:   01/25/22 140 lb (63.5 kg)   01/21/22 140 lb (63.5 kg)   01/05/22 137 lb 9.6 oz (62.4 kg)     Body mass index is 23.3 kg/m². CBC:   Lab Results   Component Value Date    WBC 10.4 11/09/2021    RBC 4.45 11/09/2021    RBC 4.16 04/30/2012    HGB 12.7 11/09/2021    HCT 40.2 11/09/2021    MCV 90.3 11/09/2021    RDW 13.8 11/09/2021     11/09/2021     04/30/2012       CMP:   Lab Results   Component Value Date     01/10/2022    K 4.4 01/10/2022     01/10/2022    CO2 21 01/10/2022    BUN 7 01/10/2022    CREATININE 0.53 01/10/2022    GFRAA >60 01/10/2022    LABGLOM >60 01/10/2022    GLUCOSE 74 01/10/2022    GLUCOSE 92 04/30/2012    PROT 7.3 10/12/2021    CALCIUM 9.7 01/10/2022    BILITOT 0.19 10/12/2021    ALKPHOS 211 10/12/2021    AST 14 10/12/2021    ALT 7 10/12/2021       POC Tests: No results for input(s): POCGLU, POCNA, POCK, POCCL, POCBUN, POCHEMO, POCHCT in the last 72 hours.     Coags:   Lab Results   Component Value Date    PROTIME 10.9 08/09/2021    INR 1.0 08/09/2021    APTT 24.6 08/09/2021       HCG (If Applicable): No results found for: PREGTESTUR, PREGSERUM, HCG, HCGQUANT     ABGs: No results found for: PHART, PO2ART, QXQ2CMH, CEN8KNY, BEART, S1AMCXDZ     Type & Screen (If Applicable):  No results found for: LABABO, LABRH    Drug/Infectious Status (If Applicable):  Lab Results   Component Value Date    HEPCAB NONREACTIVE 07/14/2019       COVID-19 Screening (If Applicable):   Lab Results Component Value Date    COVID19 Not Detected 07/22/2021    COVID19 Not Detected 05/20/2021    COVID19 Not Detected 12/05/2020           Anesthesia Evaluation   no history of anesthetic complications:   Airway: Mallampati: II     Neck ROM: full   Dental:    (+) upper dentures      Pulmonary:   (+) COPD:  current smoker    (-) recent URI                          ROS comment: 15 pk yrs  Home O2 3.5 l/min   Cardiovascular:  Exercise tolerance: good (>4 METS),   (+) hypertension:, MORENO:,                   Neuro/Psych:   (+) seizures: well controlled, neuromuscular disease:, headaches: tension headaches, depression/anxiety    (-) CVA            ROS comment: RSD  neuropathy GI/Hepatic/Renal:   (+) GERD: well controlled,          ROS comment: etoh use disorder. Endo/Other:        (-) diabetes mellitus               Abdominal:             Vascular: Other Findings:           Anesthesia Plan      general     ASA 4       Induction: intravenous.                         Deidra Darden MD   2/9/2022

## 2022-02-09 NOTE — TELEPHONE ENCOUNTER
Pt called stating she had a procedure done this morning, stated her GI strongly wants her to stop smoking and suggested she call us and get a prescription order for Chantix

## 2022-02-09 NOTE — INTERVAL H&P NOTE
Pt Name: Daphney Alcaraz  MRN: [id number]  YOB: 1970  Date of evaluation: 2/9/2022    I have reviewed the patient's history and physical examination completed in pre-procedure dated 1/25/22. Changes to history or on examination, if any, are as follows:  none. Patient had procedure 1/25 for anal and perianal warts. She has been scheduled now for ERCP, EUS/EGD, has gastroparesis, GERD.     Marisol Lubin PA-C  2/9/22  9:57 AM

## 2022-02-09 NOTE — TELEPHONE ENCOUNTER
Called in - there is an order for taper and then 1mg BID chantix - the taper is first, then the 1mg BID - please notify

## 2022-02-10 ENCOUNTER — TELEPHONE (OUTPATIENT)
Dept: PULMONOLOGY | Age: 52
End: 2022-02-10

## 2022-02-10 NOTE — TELEPHONE ENCOUNTER
Pt is scheduled for a f/u with you on 3/22/22. O2 sat drops to 66% w/o O2. Shortness of breath, wheezing, rattle sound. Was coughing up a greenish color phlegm. Now she has more of a dry cough. She has been feeling sick for almost 1 week. Pt has increased her O2 to 4 liters. Please advise. Thank you.

## 2022-02-10 NOTE — ANESTHESIA POSTPROCEDURE EVALUATION
Department of Anesthesiology  Postprocedure Note    Patient: Cheryl Guzmán  MRN: [id number]  YOB: 1970  Date of evaluation: 2/10/2022  Time:  2:00 PM     Procedure Summary     Date: 02/09/22 Room / Location: [address]    Anesthesia Start: 1039 Anesthesia Stop: 3330    Procedures:       ENDOSCOPIC ULTRASOUND, EGD - GI SCHEDULED (N/A )      EGD STENT REMOVAL Diagnosis: (GASTROPARESIS, GERD)    Surgeons: Tracie Ramirez MD Responsible Provider: Ai Romero MD    Anesthesia Type: general ASA Status: 4          Anesthesia Type: general    Marcell Phase I: Marcell Score: 10    Marcell Phase II: Marcell Score: 10    Last vitals: Reviewed and per EMR flowsheets.        Anesthesia Post Evaluation    Patient location during evaluation: PACU  Patient participation: complete - patient participated  Level of consciousness: awake and alert  Pain score: 0  Nausea & Vomiting: no nausea  Cardiovascular status: hemodynamically stable  Respiratory status: room air

## 2022-02-11 ENCOUNTER — TELEMEDICINE (OUTPATIENT)
Dept: PSYCHIATRY | Age: 52
End: 2022-02-11
Payer: MEDICARE

## 2022-02-11 ENCOUNTER — HOSPITAL ENCOUNTER (OUTPATIENT)
Dept: PSYCHIATRY | Age: 52
Setting detail: THERAPIES SERIES
Discharge: HOME | End: 2022-02-11
Payer: MEDICARE

## 2022-02-11 DIAGNOSIS — F41.1 GAD (GENERALIZED ANXIETY DISORDER): Primary | ICD-10-CM

## 2022-02-11 DIAGNOSIS — F33.1 MODERATE EPISODE OF RECURRENT MAJOR DEPRESSIVE DISORDER (HCC): ICD-10-CM

## 2022-02-11 DIAGNOSIS — F10.21 ALCOHOL USE DISORDER, SEVERE, IN EARLY REMISSION (HCC): ICD-10-CM

## 2022-02-11 PROCEDURE — 99214 OFFICE O/P EST MOD 30 MIN: CPT | Performed by: NURSE PRACTITIONER

## 2022-02-11 PROCEDURE — 90837 PSYTX W PT 60 MINUTES: CPT | Performed by: SOCIAL WORKER

## 2022-02-11 RX ORDER — ESCITALOPRAM OXALATE 20 MG/1
20 TABLET ORAL DAILY
Qty: 30 TABLET | Refills: 1 | Status: SHIPPED | OUTPATIENT
Start: 2022-02-11 | End: 2022-04-20 | Stop reason: SDUPTHER

## 2022-02-11 RX ORDER — CHLORPROMAZINE HYDROCHLORIDE 10 MG/1
10 TABLET, FILM COATED ORAL 3 TIMES DAILY PRN
Qty: 90 TABLET | Refills: 1 | Status: SHIPPED | OUTPATIENT
Start: 2022-02-11 | End: 2022-08-09 | Stop reason: ALTCHOICE
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
## 2022-02-13 DIAGNOSIS — K86.0 ALCOHOL-INDUCED CHRONIC PANCREATITIS (HCC): ICD-10-CM

## 2022-02-14 ENCOUNTER — HOSPITAL ENCOUNTER (OUTPATIENT)
Dept: PSYCHIATRY | Age: 52
Setting detail: THERAPIES SERIES
Discharge: HOME | End: 2022-02-14
Payer: MEDICARE

## 2022-02-14 PROCEDURE — 90837 PSYTX W PT 60 MINUTES: CPT | Performed by: SOCIAL WORKER

## 2022-02-14 RX ORDER — PANCRELIPASE 24000; 76000; 120000 [USP'U]/1; [USP'U]/1; [USP'U]/1
CAPSULE, DELAYED RELEASE PELLETS ORAL
Qty: 90 CAPSULE | Refills: 1 | Status: SHIPPED | OUTPATIENT
Start: 2022-02-14 | End: 2022-05-16

## 2022-02-14 NOTE — TELEPHONE ENCOUNTER
Last visit: 1/5/22  Last Med refill: 12/20/21  Does patient have enough medication for 72 hours: Yes    Next Visit Date:  Future Appointments   Date Time Provider Lexi Castillo   2/14/2022  2:00 PM Wang Spine, [id number]   2/23/2022  2:00 PM [facility], [facility] [address] [facility]   2/24/2022  2:50 PM Jona Joel MD AFL [facility] None   3/2/2022  2:00 PM [facility], [facility]   3/9/2022  2:00 PM [facility], [facility]   3/11/2022  1:00 PM LOI Tamez NP PAZ [facility] [address]   3/16/2022  2:00 PM [facility], [facility]   3/17/2022  2:20 PM Yoselyn Case MD Neuro Spec [email or web address]   3/22/2022  1:30 PM Shawn Mandujano MD Resp Spec [place]   3/23/2022  2:00 PM [facility], [facility]   3/30/2022  2:00 PM [facility], [facility]   4/5/2022  2:15 PM Arti Elysa Kussmaul, MD [facility]   4/6/2022  2:00 PM [facility], [facility]   4/13/2022  2:00 PM [facility], [facility]   4/15/2022  1:30 PM Rama Blackburn MD sv gr lks [facility]   4/20/2022  2:00 PM [facility], [address]       Health Maintenance   Topic Date Due    Depression Monitoring  Never done    Breast cancer screen  11/06/2021    Shingles Vaccine (2 of 2) 12/30/2021    Potassium monitoring  01/10/2023    Creatinine monitoring  02/09/2023    Lipid screen  12/23/2025    DTaP/Tdap/Td vaccine (2 - Td or Tdap) 12/28/2030    Colon cancer screen colonoscopy  11/19/2031    Pneumococcal 0-64 years Vaccine (2 of 2 - PPSV23) 07/05/2034    Flu vaccine  Completed    COVID-19 Vaccine  Completed    Hepatitis C screen  Completed    HIV screen  Completed    Hepatitis A vaccine  Aged Out    Hepatitis B vaccine  Aged Out    Hib vaccine  Aged Out    Meningococcal (ACWY) vaccine  Aged Out       Hemoglobin A1C (%)   Date Value   04/13/2021 5.5 07/14/2019 4.3             ( goal A1C is < 7)   No results found for: LABMICR  LDL Cholesterol (mg/dL)   Date Value   12/23/2020 134 (H)   06/18/2019 92       (goal LDL is <100)   AST (U/L)   Date Value   10/12/2021 14     ALT (U/L)   Date Value   10/12/2021 7     BUN (mg/dL)   Date Value   01/10/2022 7     BP Readings from Last 3 Encounters:   02/09/22 (!) 161/90   02/09/22 134/79   01/25/22 (!) 149/107          (goal 120/80)    All Future Testing planned in [facility]  Lab Frequency Next Occurrence   EGD Once 03/30/2021   SLP videofluoroscopic swallow study Once 07/31/2021   COVID-19 Once 05/19/2021   CT ABDOMEN PELVIS W WO CONTRAST Additional Contrast? Radiologist Recommendation Once 09/10/2021   DEXA VERTEBRAL FRACTURE ASSESSMENT Once 08/31/2021   FACET JOINT L/S SINGLE Once 09/16/2021   TAMMIE Digital Screen Bilateral [[id number]] Once 03/01/2022   COLONOSCOPY (Diagnostic) Once 12/05/2021   OT functional capacity evaluation (FCE) Once 12/14/2021   EGD Once 02/04/2022   ERCP Once 02/04/2022               Patient Active Problem List:     Acute bronchitis     Reflex sympathetic dystrophy of lower limb     Essential hypertension     Nicotine addiction     Psychophysiological insomnia     Anxiety     Alcoholic peripheral neuropathy (HCC)     Hypokalemia     Macrocytic anemia     Hypomagnesemia     Tobacco use     Ambulatory dysfunction     Seizure disorder ([address]     COPD with acute exacerbation (HCC)     Paresthesia of lower extremity     Acute respiratory failure with hypoxia (HCC)     Pancreatic cyst     Recurrent major depressive disorder (HCC)     Elevated CA 19-9 level     Perineal mass, female     Esophagitis candidal      Condyloma     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24, the patient underwent 2 surgical resections without known recurrence     Alcohol use disorder, severe, in early remission ([address]     Metabolic encephalopathy     AMAN (generalized anxiety disorder)     Major depressive disorder, recurrent severe without psychotic features ([address]     Esophageal dysphagia     Chronic peripheral neuropathic pain     History of alcohol abuse     History of petit-mal seizures     Intractable episodic tension-type headache     Personal history of astrocytoma     Multilevel spine pain     Chronic pain syndrome     Marijuana abuse     Severe sepsis (HCC)     Closed fracture of fifth thoracic vertebra (HCC)     Diverticulitis of large intestine with abscess without bleeding     Elevated brain natriuretic peptide (BNP) level     Elevated alkaline phosphatase level     Chronic pancreatitis (HCC)     Erythema ab igne     Compression fracture of thoracic vertebra (HCC)     Pathological compression fracture of lumbar vertebra with delayed healing     Metabolic acidosis with increased anion gap and accumulation of organic acids     Community acquired pneumonia of left lower lobe of lung     Septicemia ([address]     Alcohol-induced acute pancreatitis     Fluid collection of pancreas     Diverticulitis of large intestine without perforation or abscess with bleeding     Pseudocyst of pancreas     Bandemia     Sepsis ([address]     Acute recurrent pancreatitis     Atelectasis of left lung     Hyponatremia     Iron deficiency anemia     Adenomatous polyp of sigmoid colon     Moderate episode of recurrent major depressive disorder ([address]

## 2022-02-14 NOTE — PSYCHOTHERAPY
TELEHEALTH EVALUATION -- Audio/Visual (During WAUCD-95 public health emergency)     [facility]  MARVIN Mcclellan   2/11/2022  2:00 PM  Pipo Moraes  1970  [id number]    Patient location is at their home address. Location of clinician is at [facility] located at [address] [place] [address]. Time spent with Patient: 60 minutes      Pt was provided informed consent for the [facility]. Discussed with patient model of service to include the limits of confidentiality (i.e. abuse reporting, suicide intervention, etc.) and short-term intervention focused approach. Pt indicated understanding. S:  Pt and therapist engaged in a check in. Pt reports she has her disability hearing on Tuesday and had some difficulty setting up the platform she will use for the virtual meeting. Pt and therapist processed pt's frustration at her , stepF, and sister. Pt identified that it still was not working and was nervous about it. Pt also reframed her thoughts and was able to avoid engaging in the cognitive distortion of catastrophizing and reported that she will get help from her sister either this weekend or on Tuesday morning. Therapist praised pt for using good cognitive skills. Pt and therapist also discussed the [place] of control and pt made committment to focus on what she can control, including what she will say at the hearing. Pt reports she can't control what the  does. Therapist pointed out pt's ability to cognitively restructure her thoughts. Pt and therapist also discussed boundaries and processed other anxieties from the week.       O:  MSE:     Appearance    alert, cooperative  Appetite normal  Sleep disturbance No  Fatigue Yes  Loss of pleasure Yes  Impulsive behavior No  Speech    normal rate, normal volume and well articulated  Mood    Anxious  Affect    anxiety  Thought Content    intact  Thought Process    linear, goal directed and coherent  Associations    logical connections  Insight    Good  Judgment    Intact  Orientation    oriented to person, place, time, and general circumstances  Memory    recent and remote memory intact  Attention/Concentration    intact  Morbid ideation No  Suicide Assessment    no suicidal ideation    A:  Pt presented to therapy as engaged and slightly anxious. Through CBT techniques and emotional processing pt became less anxious as session progressed. Pt was able to reframe her thoughts and avoid cognitive distortions on her own displaying an ability to utilize skills in her daily life to reduce anxiety. Pt is displaying anxious symptoms and reporting intrusive memories, some dissociation, and hypervigilance. Pt's symptoms are still consistent with PTSD but therapist will assess in next session to identify most appropriate diagnosis.          Visit Diagnosis:   PTSD      History from Medical Record:        Diagnosis Date    Acute respiratory failure with hypoxia ([facility] 10/16/2020    Alcohol withdrawal syndrome, with delirium ([facility] 12/14/2019    Alcoholism ([facility]     Anal warts     Anemia 10/2020    GI bleed    Anxiety     Astrocytoma ([facility] - diagnosed at age 24, the patient underwent 2 surgical resections without known recurrence 10/23/2020    at age 24    Closed fracture of lateral portion of left tibial plateau 59/32/4455    Condyloma     COPD (chronic obstructive pulmonary disease) ([address]     CO2 retainer, on Bipap at night for this, Dr. Evan Reyes ( last visit 11/20/2020 and note on chart )    Depression      major depressive disorder, ptsd, anxiety    Dysphagia     GI bleed 10/2020    Hypertension     Memory loss     Oxygen dependent     USES  3L/MIN DAILY PRN AND NIGHTLY CONTINUOUSLY    Pain, joint, ankle and foot     Pancreatic lesion 10/2020    Dr. Mercy Saucedo working up pt    Perianal wart     Peripheral neuropathy     Seizures ([facility]     LAST SEIZURE 3/2020 - FEELS IT WAS FROM ALCOHOL WITHDRAWL    Tension headache     Under care of team 09/29/2021    [facility]    Under care of team 09/29/2021    pain management-Hamzah Martines-nery jimenez-last visit sep 2021    Under care of team     pulmonology-Dr Dueñas-[facility]-due for visit March 2022    Under care of team 09/29/2021    psych-bahnfeldt NP-telemed-last visit 10/ 2021    Under care of team 09/29/2021    [address]-last visit aug 2021    Under care of team     NEPHROLOGY - DR. LEE    Wears glasses     Wears partial dentures     UPPER    Wellness examination     pcp-Ludivina Grimm-Mario grimes-last visit Jan 5, 2022     Medications:   Current Outpatient Medications   Medication Sig Dispense Refill    escitalopram (LEXAPRO) 20 MG tablet Take 1 tablet by mouth daily 30 tablet 1    chlorproMAZINE (THORAZINE) 10 MG tablet Take 1 tablet by mouth 3 times daily as needed (anxiety) 90 tablet 1    varenicline (CHANTIX) 0.5 MG tablet Take 1-2 tablets by mouth See Admin Instructions 0.5mg DAILY for 3 days followed by 0.5mg TWICE DAILY for 4 days followed by 1mg TWICE DAILY 57 tablet 0    varenicline (CHANTIX CONTINUING MONTH PAK) 1 MG tablet Take 1 tablet by mouth 2 times daily Start this after finishing taper. 60 tablet 3    hydrOXYzine (ATARAX) 25 MG tablet Take 25 mg by mouth 3 times daily as needed for Itching      senna (SENOKOT) 8.6 MG tablet Take 1 tablet by mouth 2 times daily 60 tablet 11    lidocaine (XYLOCAINE) 5 % ointment Apply topically as needed.  30 g 1    sucralfate (CARAFATE) 1 GM tablet Take 1 tablet by mouth 4 times daily 120 tablet 3    sodium chloride 1 g tablet TAKE ONE TABLET BY MOUTH THREE TIMES A DAY (Patient taking differently: Take 1 g by mouth 4 times daily ) 90 tablet 11    busPIRone (BUSPAR) 15 MG tablet Take 15 mg by mouth three times daily 90 tablet 1    prazosin (MINIPRESS) 1 MG capsule Take 1 capsule by mouth nightly 30 capsule 3  mirtazapine (REMERON) 7.5 MG tablet Take 1 tablet by mouth nightly as needed (sleep) 30 tablet 3    carBAMazepine (TEGRETOL) 200 MG tablet TAKE ONE TABLET BY MOUTH THREE TIMES A DAY 90 tablet 5    metoclopramide (REGLAN) 5 MG tablet TAKE ONE TABLET BY MOUTH THREE TIMES A DAY 90 tablet 3    rOPINIRole (REQUIP) 1 MG tablet TAKE ONE TABLET BY MOUTH ONCE NIGHTLY 90 tablet 1    topiramate (TOPAMAX) 50 MG tablet Take 1.5 tab bid for 2 wk, then two tab bid (Patient taking differently: Take 50 mg by mouth 2 times daily ) 120 tablet 3    CREON 74758-74398 units delayed release capsule TAKE ONE CAPSULE BY MOUTH THREE TIMES A DAY WITH MEALS 90 capsule 1    omeprazole (PRILOSEC) 40 MG delayed release capsule TAKE ONE CAPSULE BY MOUTH TWICE A DAY 60 capsule 2    fluticasone-umeclidin-vilant (TRELEGY ELLIPTA) 100-62.5-25 MCG/INH AEPB Inhale 1 puff into the lungs daily 1 each 5    denosumab (PROLIA) 60 MG/ML SOSY SC injection Inject 1 mL into the skin every 6 months 1 mL 1    pregabalin (LYRICA) 200 MG capsule Take 1 capsule by mouth 3 times daily for 180 days. 90 capsule 5    amLODIPine (NORVASC) 5 MG tablet TAKE ONE TABLET BY MOUTH DAILY 90 tablet 1    simethicone (MYLICON) 80 MG chewable tablet Take 1 tablet by mouth 4 times daily as needed for Flatulence 180 tablet 3    albuterol sulfate HFA (VENTOLIN HFA) 108 (90 Base) MCG/ACT inhaler Inhale 2 puffs into the lungs 4 times daily as needed for Wheezing 1 Inhaler 5    vitamin D (ERGOCALCIFEROL) 49344 units CAPS capsule Take 1 capsule by mouth once a week 12 capsule 1    folic acid (FOLVITE) 1 MG tablet Take 1 tablet by mouth daily 90 tablet 1     No current facility-administered medications for this encounter.        Social History:   Social History     Socioeconomic History    Marital status: Single     Spouse name: Not on file    Number of children: Not on file    Years of education: Not on file    Highest education level: Not on file   Occupational History    Not on file   Tobacco Use    Smoking status: Current Every Day Smoker     Packs/day: 0.50     Years: 30.00     Pack years: 15.00     Types: Cigarettes    Smokeless tobacco: Never Used    Tobacco comment: was smoking 1 ppd   Vaping Use    Vaping Use: Never used   Substance and Sexual Activity    Alcohol use: Not Currently     Alcohol/week: 0.0 standard drinks    Drug use: Not Currently     Frequency: 2.0 times per week     Comment: 6 months ago    Sexual activity: Not Currently   Other Topics Concern    Not on file   Social History Narrative    Not on file     Social Determinants of Health     Financial Resource Strain: Medium Risk    Difficulty of Paying Living Expenses: Somewhat hard   Food Insecurity: No Food Insecurity    Worried About Running Out of Food in the Last Year: Never true    [place] of Food in the Last Year: Never true   Transportation Needs:     Lack of Transportation (Medical): Not on file    Lack of Transportation (Non-Medical): Not on file   Physical Activity:     Days of Exercise per Week: Not on file    Minutes of Exercise per Session: Not on file   Stress:     Feeling of Stress : Not on file   Social Connections:     Frequency of Communication with Friends and Family: Not on file    Frequency of Social Gatherings with Friends and Family: Not on file    Attends Jew Services: Not on file    Active Member of [address] [place] [address] or Organizations: Not on file    Attends Club or Organization Meetings: Not on file    Marital Status: Not on file   Intimate Partner Violence:     Fear of Current or Ex-Partner: Not on file    Emotionally Abused: Not on file    Physically Abused: Not on file    Sexually Abused: Not on file   Housing Stability:     Unable to Pay for Housing in the Last Year: Not on file    Number of [place] in the Last Year: Not on file    Unstable Housing in the Last Year: Not on file       TOBACCO:   reports that she has been smoking cigarettes.  She has a 15.00 pack-year smoking history. She has never used smokeless tobacco.  ETOH:   reports previous alcohol use. Family History:   Family History   Problem Relation Age of Onset    Other Father     Cancer Maternal Grandmother     Heart Disease Paternal Grandmother     Esophageal Cancer Maternal Aunt     Arthritis Mother            Pt interventions:  Provided education, Motivational Interviewing to increase patient confidence and compliance with adhering to behavioral change plan, Motivational Interviewing to determine importance and readiness for change, Established rapport, Supportive techniques, CBT to target negative thoughts , Cognitive strategies to target anxiety including reframing and Identified maladaptive thoughts        PLAN:   PTSD assessment (PCL-5)  Process hearing  Grounding/Mindfulness techniques  Review thought log       Patient scheduled to return on:   Monday 2/14/22 at 2 PM     Were changes or additions made to the treatment plan today? YES []   NO [x]  Noted changes:                Pursuant to the emergency declaration under the [facility] waiver authority and the [employer] General Act, this Virtual Visit was conducted, with patient's consent, to reduce the patient's risk of exposure to COVID-19 and provide continuity of care for an established patient. Services were provided through a video synchronous discussion virtually to substitute for in-person clinic visit.

## 2022-02-15 NOTE — PSYCHOTHERAPY
TELEHEALTH EVALUATION -- Audio/Visual (During ZTVER-27 public health emergency)     [facility]  Jen MARVIN Piña   2/14/2022  2:00 PM  Kady Dutta  1970  [id number]    Patient location is at their home address. Location of clinician is at [facility] located at [address] [place] [address]. Time spent with Patient: 60 minutes      Pt was provided informed consent for the [facility]. Discussed with patient model of service to include the limits of confidentiality (i.e. abuse reporting, suicide intervention, etc.) and short-term intervention focused approach. Pt indicated understanding. S:  Pt and therapist engaged in a check in and pt reports that her disability hearing is on Thursday as he  had gotten the dates wrong. Pt and therapist problem solved ways to help pt remember how to log on to a [employer] call in order to reduce pt's nerves. Therapist led pt in mindfulness activities during session when she started to get anxious and overwhelmed. Pt and therapist also discussed coping skills and self-care. Pt and therapist processed the fact that pt no longer has some of her favorite books/movies/music. Pt reports a friend helped her clean her house when she was still drinking and she does not know what happened to it. Therapist and pt discussed engaging in assertive communication to help discover if her things are stored somewhere or if they got thrown away. Pt and therapist processed feelings associated with this. Pt and therapist also reviewed pt's thought log and used cognitive techniques to address the thought that pt is lazy and \"not a real grownup. \"  Pt and therapist problem solved ways for pt to complete tasks and discussed the cycle of anxiety and negative thinking. Pt admitted \"sometimes I don't like myself. \"  Pt also reports that she struggles with memory but some things she does remember hurt.   Pt and therapist discussed avoidance and addressing the things that hurt. O:  MSE:     Appearance    alert, cooperative  Appetite normal  Sleep disturbance No  Fatigue Yes  Loss of pleasure Yes  Impulsive behavior No  Speech    normal rate, normal volume and well articulated  Mood    Anxious  Affect    anxiety  Thought Content    cognitive distortions and all or nothing thinking  Thought Process    linear and goal directed  Associations    logical connections  Insight    Good  Judgment    Intact  Orientation    oriented to person, place, time, and general circumstances  Memory    recent and remote memory intact  Attention/Concentration    intact  Morbid ideation No  Suicide Assessment    no suicidal ideation    A:  Pt presented to session as anxious and managed anxiety by engaging in grounding techniques during session. Pt displayed an ability to reframe some thoughts and avoid engaging in cognitive distortions of catastrophizing. Pt was able to focus on what she could control. Pt did engage in all or nothing thinking when assuming she was \"lazy and the worst\" when she does not keep her home the same way her M does. Pt also reports that \"some things she remembers hurt\" and expresses that she feels on edge and nervous. Therapist unable to conduct PCL-5 due to time but will do so next session.         Visit Diagnosis:   PTSD      History from Medical Record:        Diagnosis Date    Acute respiratory failure with hypoxia ([address] 10/16/2020    Alcohol withdrawal syndrome, with delirium ([address] 12/14/2019    Alcoholism ([address]     Anal warts     Anemia 10/2020    GI bleed    Anxiety     Astrocytoma ([facility] - diagnosed at age 24, the patient underwent 2 surgical resections without known recurrence 10/23/2020    at age 24    Closed fracture of lateral portion of left tibial plateau 12/96/0984    Condyloma     COPD (chronic obstructive pulmonary disease) ([facility]     CO2 retainer, on Bipap at night for this, Dr. Patrick Blue ( last visit 11/20/2020 and note on chart )    Depression      major depressive disorder, ptsd, anxiety    Dysphagia     GI bleed 10/2020    Hypertension     Memory loss     Oxygen dependent     USES  3L/MIN DAILY PRN AND NIGHTLY CONTINUOUSLY    Pain, joint, ankle and foot     Pancreatic lesion 10/2020    Dr. Kerwin Parnell working up pt    Perianal wart     Peripheral neuropathy     Seizures ([facility]     LAST SEIZURE 3/2020 - FEELS IT WAS FROM ALCOHOL WITHDRAWL    Tension headache     Under care of team 09/29/2021    [facility]    Under care of team 09/29/2021    pain management-Hamzah Martines-nery ramirezia-last visit sep 2021    Under care of team     pulmonology-Dr [facility]as-[facility]-due for visit March 2022    Under care of team 09/29/2021    psych-bahnfeldt NP-telemed-last visit 10/ 2021    Under care of team 09/29/2021    ed-[address]-last visit aug 2021    Under care of team     NEPHROLOGY - DR. LEE    Wears glasses     Wears partial dentures     UPPER    Wellness examination     pcp-Ludivina grimes-last visit Jan 5, 2022     Medications:   Current Outpatient Medications   Medication Sig Dispense Refill    CREON 61367-31360 units delayed release capsule TAKE ONE CAPSULE BY MOUTH THREE TIMES A DAY WITH MEALS 90 capsule 1    escitalopram (LEXAPRO) 20 MG tablet Take 1 tablet by mouth daily 30 tablet 1    chlorproMAZINE (THORAZINE) 10 MG tablet Take 1 tablet by mouth 3 times daily as needed (anxiety) 90 tablet 1    varenicline (CHANTIX) 0.5 MG tablet Take 1-2 tablets by mouth See Admin Instructions 0.5mg DAILY for 3 days followed by 0.5mg TWICE DAILY for 4 days followed by 1mg TWICE DAILY 57 tablet 0    varenicline (CHANTIX CONTINUING MONTH PAK) 1 MG tablet Take 1 tablet by mouth 2 times daily Start this after finishing taper.  60 tablet 3    hydrOXYzine (ATARAX) 25 MG tablet Take 25 mg by mouth 3 times daily as needed for Itching  senna (SENOKOT) 8.6 MG tablet Take 1 tablet by mouth 2 times daily 60 tablet 11    lidocaine (XYLOCAINE) 5 % ointment Apply topically as needed. 30 g 1    sucralfate (CARAFATE) 1 GM tablet Take 1 tablet by mouth 4 times daily 120 tablet 3    sodium chloride 1 g tablet TAKE ONE TABLET BY MOUTH THREE TIMES A DAY (Patient taking differently: Take 1 g by mouth 4 times daily ) 90 tablet 11    busPIRone (BUSPAR) 15 MG tablet Take 15 mg by mouth three times daily 90 tablet 1    prazosin (MINIPRESS) 1 MG capsule Take 1 capsule by mouth nightly 30 capsule 3    mirtazapine (REMERON) 7.5 MG tablet Take 1 tablet by mouth nightly as needed (sleep) 30 tablet 3    carBAMazepine (TEGRETOL) 200 MG tablet TAKE ONE TABLET BY MOUTH THREE TIMES A DAY 90 tablet 5    metoclopramide (REGLAN) 5 MG tablet TAKE ONE TABLET BY MOUTH THREE TIMES A DAY 90 tablet 3    rOPINIRole (REQUIP) 1 MG tablet TAKE ONE TABLET BY MOUTH ONCE NIGHTLY 90 tablet 1    topiramate (TOPAMAX) 50 MG tablet Take 1.5 tab bid for 2 wk, then two tab bid (Patient taking differently: Take 50 mg by mouth 2 times daily ) 120 tablet 3    omeprazole (PRILOSEC) 40 MG delayed release capsule TAKE ONE CAPSULE BY MOUTH TWICE A DAY 60 capsule 2    fluticasone-umeclidin-vilant (TRELEGY ELLIPTA) 100-62.5-25 MCG/INH AEPB Inhale 1 puff into the lungs daily 1 each 5    denosumab (PROLIA) 60 MG/ML SOSY SC injection Inject 1 mL into the skin every 6 months 1 mL 1    pregabalin (LYRICA) 200 MG capsule Take 1 capsule by mouth 3 times daily for 180 days.  90 capsule 5    amLODIPine (NORVASC) 5 MG tablet TAKE ONE TABLET BY MOUTH DAILY 90 tablet 1    simethicone (MYLICON) 80 MG chewable tablet Take 1 tablet by mouth 4 times daily as needed for Flatulence 180 tablet 3    albuterol sulfate HFA (VENTOLIN HFA) 108 (90 Base) MCG/ACT inhaler Inhale 2 puffs into the lungs 4 times daily as needed for Wheezing 1 Inhaler 5    vitamin D (ERGOCALCIFEROL) 53008 units CAPS capsule Take 1 capsule by mouth once a week 12 capsule 1    folic acid (FOLVITE) 1 MG tablet Take 1 tablet by mouth daily 90 tablet 1     No current facility-administered medications for this encounter. Social History:   Social History     Socioeconomic History    Marital status: Single     Spouse name: Not on file    Number of children: Not on file    Years of education: Not on file    Highest education level: Not on file   Occupational History    Not on file   Tobacco Use    Smoking status: Current Every Day Smoker     Packs/day: 0.50     Years: 30.00     Pack years: 15.00     Types: Cigarettes    Smokeless tobacco: Never Used    Tobacco comment: was smoking 1 ppd   Vaping Use    Vaping Use: Never used   Substance and Sexual Activity    Alcohol use: Not Currently     Alcohol/week: 0.0 standard drinks    Drug use: Not Currently     Frequency: 2.0 times per week     Comment: 6 months ago    Sexual activity: Not Currently   Other Topics Concern    Not on file   Social History Narrative    Not on file     Social Determinants of Health     Financial Resource Strain: Medium Risk    Difficulty of Paying Living Expenses: Somewhat hard   Food Insecurity: No Food Insecurity    Worried About Running Out of Food in the Last Year: Never true    [place] of Food in the Last Year: Never true   Transportation Needs:     Lack of Transportation (Medical): Not on file    Lack of Transportation (Non-Medical):  Not on file   Physical Activity:     Days of Exercise per Week: Not on file    Minutes of Exercise per Session: Not on file   Stress:     Feeling of Stress : Not on file   Social Connections:     Frequency of Communication with Friends and Family: Not on file    Frequency of Social Gatherings with Friends and Family: Not on file    Attends Zoroastrian Services: Not on file    Active Member of Clubs or Organizations: Not on file    Attends Club or Organization Meetings: Not on file    Marital Status: Not on file Intimate Partner Violence:     Fear of Current or Ex-Partner: Not on file    Emotionally Abused: Not on file    Physically Abused: Not on file    Sexually Abused: Not on file   Housing Stability:     Unable to Pay for Housing in the Last Year: Not on file    Number of [place] in the Last Year: Not on file    Unstable Housing in the Last Year: Not on file       TOBACCO:   reports that she has been smoking cigarettes. She has a 15.00 pack-year smoking history. She has never used smokeless tobacco.  ETOH:   reports previous alcohol use. Family History:   Family History   Problem Relation Age of Onset    Other Father     Cancer Maternal Grandmother     Heart Disease Paternal Grandmother     Esophageal Cancer Maternal Aunt     Arthritis Mother            Pt interventions:  Practiced assertive communication, Trained in relaxation strategies, Provided education, Discussed self-care (sleep, nutrition, rewarding activities, social support, exercise), Established rapport, CBT to target negative thoughts, Cognitive strategies to target negative thoughts  including cognitive restructuring  and Identified maladaptive thoughts        PLAN:   PCL-5  Process hearing   Cognitive techniques       Patient scheduled to return on: Wed 2/23/22 at 2 PM    Were changes or additions made to the treatment plan today? YES []   NO [x]  Noted changes:                Pursuant to the emergency declaration under the [facility] waiver authority and the [employer] General Act, this Virtual Visit was conducted, with patient's consent, to reduce the patient's risk of exposure to COVID-19 and provide continuity of care for an established patient. Services were provided through a video synchronous discussion virtually to substitute for in-person clinic visit.

## 2022-02-23 ENCOUNTER — HOSPITAL ENCOUNTER (OUTPATIENT)
Dept: PSYCHIATRY | Age: 52
Setting detail: THERAPIES SERIES
Discharge: HOME | End: 2022-02-23
Payer: MEDICARE

## 2022-02-23 PROCEDURE — 90837 PSYTX W PT 60 MINUTES: CPT | Performed by: SOCIAL WORKER

## 2022-02-23 RX ORDER — DULOXETINE 40 MG/1
CAPSULE, DELAYED RELEASE ORAL
Qty: 30 CAPSULE | OUTPATIENT
Start: 2022-02-23

## 2022-02-24 NOTE — PSYCHOTHERAPY
TELEHEALTH EVALUATION -- Audio/Visual (During WWRJS-11 public health emergency)     [facility]  Myrna Turner KEESHAMONIKA   2/23/2022  2:00 PM   Junior Sharp  1970  [id number]    Patient location is at their home address. Location of clinician is at [facility] located at [address] [place] [address]. Time spent with Patient: 60 minutes      Pt was provided informed consent for the [facility]. Discussed with patient model of service to include the limits of confidentiality (i.e. abuse reporting, suicide intervention, etc.) and short-term intervention focused approach. Pt indicated understanding. S:  Pt and therapist processed her disability hearing. Therapist used cognitive techniques to help pt let go of anxiety in waiting for the results and offered emotional support to pt as she processed the anxiousness she experienced in the hearing. Pt identifies that she is \"thrilled\" it's over but is still anxious. Pt and therapist discussed her family, including her family hx of anxiety. Pt and therapist unable to complete PCL-5 as therapist wants pt to complete it in person. Pt and therapist engaged in processing about pt's F Gamaliel Ospina). Therapist used cognitive techniques, active listening, and emotional support. Pt identified that she was \"addicted to her dad\" but did not want to explore that further. Pt wants therapist to bring it up at a later date. Pt and therapist discussed pt's feelings of weakness and other negative perceptions of self. Pt identified she doesn't want others to know her as she is \"pretty not right. \"  Therapist and pt began to identify negative thinking patterns and ways to reframe thoughts.       O:  MSE:     Appearance    alert, cooperative  Appetite normal  Sleep disturbance Yes  Fatigue Yes  Loss of pleasure Yes  Impulsive behavior No  Speech    normal rate, normal volume and well articulated  Mood Anxious  Depressed  Low self-esteem  Affect    anxiety  Thought Content    intact  Thought Process    linear, goal directed and coherent  Associations    logical connections  Insight    Good  Judgment    Intact  Orientation    oriented to person, place, time, and general circumstances  Memory    recent and remote memory intact  Attention/Concentration    intact  Morbid ideation No  Suicide Assessment    no suicidal ideation    A:  Pt presented to session as anxious and depressed and became tearful throughout session. Pt does well at exploring her thoughts but gets overwhelmed easily by the \"weight\" of them and shuts down. For example, pt was able to identify that she was \"addicted to her F\" but was not ready to explore that thought further. Pt becomes anxious and overwhelmed easily and needs to take things slowly and work through thoughts and feelings at a pace she is comfortable with.           Visit Diagnosis:   PTSD       History from Medical Record:        Diagnosis Date    Acute respiratory failure with hypoxia ([address] 10/16/2020    Alcohol withdrawal syndrome, with delirium ([address] 12/14/2019    Alcoholism ([address]     Anal warts     Anemia 10/2020    GI bleed    Anxiety     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24, the patient underwent 2 surgical resections without known recurrence 10/23/2020    at age 24    Closed fracture of lateral portion of left tibial plateau 80/75/8086    Condyloma     COPD (chronic obstructive pulmonary disease) ([address]     CO2 retainer, on Bipap at night for this, Dr. Chelsie Oconnor ( last visit 11/20/2020 and note on chart )    Depression      major depressive disorder, ptsd, anxiety    Dysphagia     GI bleed 10/2020    Hypertension     Memory loss     Oxygen dependent     USES  3L/MIN DAILY PRN AND NIGHTLY CONTINUOUSLY    Pain, joint, ankle and foot     Pancreatic lesion 10/2020    Dr. Genesis Green working up pt    Perianal wart     Peripheral neuropathy     Seizures ([address]     LAST SEIZURE 3/2020 - FEELS IT WAS FROM ALCOHOL WITHDRAWL    Tension headache     Under care of team 09/29/2021    neuro-Dr Coyle-[facility] ct-last visit sep 2021    Under care of team 09/29/2021    pain management-Hamzah Martines-nery jimenez-last visit sep 2021    Under care of team     pulmonology-Dr Dueñas-[facility]-due for visit March 2022    Under care of team 09/29/2021    psych-bahnfeldt NP-telemed-last visit 10/ 2021    Under care of team 09/29/2021    aw[address]-last visit aug 2021    Under care of team     NEPHROLOGY - DR. LEE    Wears glasses     Wears partial dentures     UPPER    Wellness examination     pcp-Ludivina grimes-last visit Jan 5, 2022     Medications:   Current Outpatient Medications   Medication Sig Dispense Refill    CREON 98047-08624 units delayed release capsule TAKE ONE CAPSULE BY MOUTH THREE TIMES A DAY WITH MEALS 90 capsule 1    escitalopram (LEXAPRO) 20 MG tablet Take 1 tablet by mouth daily 30 tablet 1    chlorproMAZINE (THORAZINE) 10 MG tablet Take 1 tablet by mouth 3 times daily as needed (anxiety) 90 tablet 1    varenicline (CHANTIX) 0.5 MG tablet Take 1-2 tablets by mouth See Admin Instructions 0.5mg DAILY for 3 days followed by 0.5mg TWICE DAILY for 4 days followed by 1mg TWICE DAILY 57 tablet 0    varenicline (CHANTIX CONTINUING MONTH PAK) 1 MG tablet Take 1 tablet by mouth 2 times daily Start this after finishing taper. 60 tablet 3    hydrOXYzine (ATARAX) 25 MG tablet Take 25 mg by mouth 3 times daily as needed for Itching      senna (SENOKOT) 8.6 MG tablet Take 1 tablet by mouth 2 times daily 60 tablet 11    lidocaine (XYLOCAINE) 5 % ointment Apply topically as needed.  30 g 1    sucralfate (CARAFATE) 1 GM tablet Take 1 tablet by mouth 4 times daily 120 tablet 3    sodium chloride 1 g tablet TAKE ONE TABLET BY MOUTH THREE TIMES A DAY (Patient taking differently: Take 1 g by mouth 4 times daily ) 90 tablet 11    busPIRone level: Not on file   Occupational History    Not on file   Tobacco Use    Smoking status: Current Every Day Smoker     Packs/day: 0.50     Years: 30.00     Pack years: 15.00     Types: Cigarettes    Smokeless tobacco: Never Used    Tobacco comment: was smoking 1 ppd   Vaping Use    Vaping Use: Never used   Substance and Sexual Activity    Alcohol use: Not Currently     Alcohol/week: 0.0 standard drinks    Drug use: Not Currently     Frequency: 2.0 times per week     Comment: 6 months ago    Sexual activity: Not Currently   Other Topics Concern    Not on file   Social History Narrative    Not on file     Social Determinants of Health     Financial Resource Strain: Medium Risk    Difficulty of Paying Living Expenses: Somewhat hard   Food Insecurity: No Food Insecurity    Worried About Running Out of Food in the Last Year: Never true    Tyler of Food in the Last Year: Never true   Transportation Needs:     Lack of Transportation (Medical): Not on file    Lack of Transportation (Non-Medical):  Not on file   Physical Activity:     Days of Exercise per Week: Not on file    Minutes of Exercise per Session: Not on file   Stress:     Feeling of Stress : Not on file   Social Connections:     Frequency of Communication with Friends and Family: Not on file    Frequency of Social Gatherings with Friends and Family: Not on file    Attends Yazidism Services: Not on file    Active Member of [address] [place] [address] or Organizations: Not on file    Attends Club or Organization Meetings: Not on file    Marital Status: Not on file   Intimate Partner Violence:     Fear of Current or Ex-Partner: Not on file    Emotionally Abused: Not on file    Physically Abused: Not on file    Sexually Abused: Not on file   Housing Stability:     Unable to Pay for Housing in the Last Year: Not on file    Number of [place] in the Last Year: Not on file    Unstable Housing in the Last Year: Not on file       TOBACCO:   reports that she has been smoking cigarettes. She has a 15.00 pack-year smoking history. She has never used smokeless tobacco.  ETOH:   reports previous alcohol use. Family History:   Family History   Problem Relation Age of Onset    Other Father     Cancer Maternal Grandmother     Heart Disease Paternal Grandmother     Esophageal Cancer Maternal Aunt     Arthritis Mother            Pt interventions:  Provided education, Discussed self-care (sleep, nutrition, rewarding activities, social support, exercise), Motivational Interviewing to determine importance and readiness for change, Established rapport, Supportive techniques, Emphasized self-care as important for managing overall health, Provided Psychoeducation re: anxiety, CBT to target negative thoughts and Cognitive strategies to target negative thoughts including \"I am weak\"        PLAN:   Explore thoughts associated with F further     Patient scheduled to return on: Wednesday 3/2/2022 at 2 PM    Were changes or additions made to the treatment plan today? YES []   NO []  Noted changes:                Pursuant to the emergency declaration under the [facility] waiver authority and the [employer] Act, this Virtual Visit was conducted, with patient's consent, to reduce the patient's risk of exposure to COVID-19 and provide continuity of care for an established patient. Services were provided through a video synchronous discussion virtually to substitute for in-person clinic visit.

## 2022-02-25 ENCOUNTER — TELEPHONE (OUTPATIENT)
Dept: FAMILY MEDICINE CLINIC | Age: 52
End: 2022-02-25

## 2022-02-25 NOTE — TELEPHONE ENCOUNTER
Patient called stating her psychiatrist is no longer practicing and is asking if you can refill two medications:  Prazosin 1mg at night  Cymbalta 40mg Capsule-daily    States he is having her f/u with someone named Varun Fleming. States she is seeing her for her PTSD but she is not a psychiatrist and cannot prescribe these.

## 2022-02-28 NOTE — TELEPHONE ENCOUNTER
Pt called back, states her mother giver her her medications and is not sure if mom has been giving her the Cymbalta,   Asked if there could be any counter action with the Cymbalta and any of the other medications she is taking    States her anxiety seems really high

## 2022-02-28 NOTE — TELEPHONE ENCOUNTER
She has refills on prazosin currently - ordered on 1/11/22 as 30 day supply with three refills.  Not currently on duloxetine (Cymbalta) - looks like has been on lexapro since December and dose was recently increased - please verify

## 2022-03-01 NOTE — TELEPHONE ENCOUNTER
Spoke with patient and she stated she is going through a lot of personal things and feels this is triggering her anxiety. States she will also check with her mom regarding medications.

## 2022-03-04 ENCOUNTER — HOSPITAL ENCOUNTER (OUTPATIENT)
Dept: PSYCHIATRY | Age: 52
Setting detail: THERAPIES SERIES
Discharge: HOME | End: 2022-03-04
Payer: MEDICARE

## 2022-03-04 PROCEDURE — 90837 PSYTX W PT 60 MINUTES: CPT | Performed by: SOCIAL WORKER

## 2022-03-04 NOTE — PSYCHOTHERAPY
TELEHEALTH EVALUATION -- Audio/Visual (During PJUNM-31 public health emergency)     [facility] Therapy Note  Tejal Gardiner, [address]   3/4/2022  11:00 AM  Tressia Diver  1970  [id number]    Patient location is at their home address. Location of clinician is at [address] [place] [address] [facility]. Time spent with Patient: 60 minutes      Pt was provided informed consent for the [facility]. Discussed with patient model of service to include the limits of confidentiality (i.e. abuse reporting, suicide intervention, etc.) and short-term intervention focused approach. Pt indicated understanding. S:  Pt and therapist engaged in a check in and pt reports that she has now self-identified as a \"willow. \"  Pt reports \"I am a willow, I can bend. \"  Pt reports that she has been able to work through difficult situations without getting too upset lately. Pt and therapist processed and used cognitive techniques to address her fears about going to see her step-sister who has been drinking frequently. Pt is not worried about her own drinking but about being annoyed. Therapist and pt worked to reframe thoughts and how to reframe thoughts in the moment. Therapist and pt also problem solved and worked to come up with [employer]. \"  Pt and therapist used poetry to build rapport and process past.  Pt and therapist began to explore and process thought of being \"addicted to dad. \"  Pt identified that she \"just wanted to be around him. \"  Pt and therapist continued to process pt's relationship with F.  Pt identified she sees herself as \"somewhat strong\" and reframed thoughts that she was just like him. Pt and therapist introduced though of \"I am not Mansi Leach, I don't have to play the part. \"  Therapist and pt also engaged in imagery and relaxation and practiced a [employer]. Therapist sent pt audio files after session.       O:  MSE:     Appearance alert, cooperative, mild distress  Appetite normal  Sleep disturbance No  Fatigue No  Loss of pleasure No  Impulsive behavior No  Speech    normal rate, normal volume and well articulated  Mood    Anxious but positive  Affect    anxiety  Thought Content    intact  Thought Process    linear, goal directed and coherent  Associations    logical connections  Insight    Good  Judgment    Intact  Orientation    oriented to person, place, time, and general circumstances  Memory    recent and remote memory intact  Attention/Concentration    intact  Morbid ideation No  Suicide Assessment    no suicidal ideation    A:  Pt presented to session as anxious but in a positive mood. Pt's anxiety improved as session progressed. Pt identified some growth and strength in herself, but is still affected by recurrent negative thoughts about self due to Jerel's (F) words. Pt developed skills in reframing thoughts and identified ways to manage anxious situations. Pt is dedicated to therapy and is putting effort into her care outside of sessions. Pt is still reporting disturbing memories and arousal symptoms.           Visit Diagnosis:   PTSD      History from Medical Record:        Diagnosis Date    Acute respiratory failure with hypoxia ([address] 10/16/2020    Alcohol withdrawal syndrome, with delirium ([facility] 12/14/2019    Alcoholism ([address]     Anal warts     Anemia 10/2020    GI bleed    Anxiety     Astrocytoma ([facility] - diagnosed at age 24, the patient underwent 2 surgical resections without known recurrence 10/23/2020    at age 24    Closed fracture of lateral portion of left tibial plateau 00/49/4168    Condyloma     COPD (chronic obstructive pulmonary disease) ([facility]     CO2 retainer, on Bipap at night for this, Dr. Cristiana Hunt ( last visit 11/20/2020 and note on chart )    Depression      major depressive disorder, ptsd, anxiety    Dysphagia     GI bleed 10/2020    Hypertension     Memory loss     Oxygen dependent     USES 3L/MIN DAILY PRN AND NIGHTLY CONTINUOUSLY    Pain, joint, ankle and foot     Pancreatic lesion 10/2020    Dr. Raciel Mazariegos working up pt    Perianal wart     Peripheral neuropathy     Seizures ([address]     LAST SEIZURE 3/2020 - FEELS IT WAS FROM ALCOHOL WITHDRAWL    Tension headache     Under care of team 09/29/2021    neuro-Dr Coyle-[facility]-last visit sep 2021    Under care of team 09/29/2021    pain management-aHmzah Martines-nery jimenez-last visit sep 2021    Under care of team     pulmonology-Dr Due[facility]as-[facility]-due for visit March 2022    Under care of team 09/29/2021    psych-bahnfeldt NP-telemed-last visit 10/ 2021    Under care of team 09/29/2021    [address]-last visit aug 2021    Under care of team     NEPHROLOGY - DR. LEE    Wears glasses     Wears partial dentures     UPPER    Wellness examination     pcp-Ludivina grimes-last visit Jan 5, 2022     Medications:   Current Outpatient Medications   Medication Sig Dispense Refill    CREON 12440-54077 units delayed release capsule TAKE ONE CAPSULE BY MOUTH THREE TIMES A DAY WITH MEALS 90 capsule 1    escitalopram (LEXAPRO) 20 MG tablet Take 1 tablet by mouth daily 30 tablet 1    chlorproMAZINE (THORAZINE) 10 MG tablet Take 1 tablet by mouth 3 times daily as needed (anxiety) 90 tablet 1    varenicline (CHANTIX) 0.5 MG tablet Take 1-2 tablets by mouth See Admin Instructions 0.5mg DAILY for 3 days followed by 0.5mg TWICE DAILY for 4 days followed by 1mg TWICE DAILY 57 tablet 0    varenicline (CHANTIX CONTINUING MONTH CHARISMA) 1 MG tablet Take 1 tablet by mouth 2 times daily Start this after finishing taper. 60 tablet 3    hydrOXYzine (ATARAX) 25 MG tablet Take 25 mg by mouth 3 times daily as needed for Itching      senna (SENOKOT) 8.6 MG tablet Take 1 tablet by mouth 2 times daily 60 tablet 11    lidocaine (XYLOCAINE) 5 % ointment Apply topically as needed.  30 g 1    sucralfate (CARAFATE) 1 GM tablet Take 1 tablet by mouth 4 times daily 120 tablet 3    sodium chloride 1 g tablet TAKE ONE TABLET BY MOUTH THREE TIMES A DAY (Patient taking differently: Take 1 g by mouth 4 times daily ) 90 tablet 11    busPIRone (BUSPAR) 15 MG tablet Take 15 mg by mouth three times daily 90 tablet 1    prazosin (MINIPRESS) 1 MG capsule Take 1 capsule by mouth nightly 30 capsule 3    mirtazapine (REMERON) 7.5 MG tablet Take 1 tablet by mouth nightly as needed (sleep) 30 tablet 3    carBAMazepine (TEGRETOL) 200 MG tablet TAKE ONE TABLET BY MOUTH THREE TIMES A DAY 90 tablet 5    metoclopramide (REGLAN) 5 MG tablet TAKE ONE TABLET BY MOUTH THREE TIMES A DAY 90 tablet 3    rOPINIRole (REQUIP) 1 MG tablet TAKE ONE TABLET BY MOUTH ONCE NIGHTLY 90 tablet 1    topiramate (TOPAMAX) 50 MG tablet Take 1.5 tab bid for 2 wk, then two tab bid (Patient taking differently: Take 50 mg by mouth 2 times daily ) 120 tablet 3    omeprazole (PRILOSEC) 40 MG delayed release capsule TAKE ONE CAPSULE BY MOUTH TWICE A DAY 60 capsule 2    fluticasone-umeclidin-vilant (TRELEGY ELLIPTA) 100-62.5-25 MCG/INH AEPB Inhale 1 puff into the lungs daily 1 each 5    denosumab (PROLIA) 60 MG/ML SOSY SC injection Inject 1 mL into the skin every 6 months 1 mL 1    pregabalin (LYRICA) 200 MG capsule Take 1 capsule by mouth 3 times daily for 180 days. 90 capsule 5    amLODIPine (NORVASC) 5 MG tablet TAKE ONE TABLET BY MOUTH DAILY 90 tablet 1    simethicone (MYLICON) 80 MG chewable tablet Take 1 tablet by mouth 4 times daily as needed for Flatulence 180 tablet 3    albuterol sulfate HFA (VENTOLIN HFA) 108 (90 Base) MCG/ACT inhaler Inhale 2 puffs into the lungs 4 times daily as needed for Wheezing 1 Inhaler 5    vitamin D (ERGOCALCIFEROL) 44103 units CAPS capsule Take 1 capsule by mouth once a week 12 capsule 1    folic acid (FOLVITE) 1 MG tablet Take 1 tablet by mouth daily 90 tablet 1     No current facility-administered medications for this encounter.        Social History:   Social History     Socioeconomic History    Marital status: Single     Spouse name: Not on file    Number of children: Not on file    Years of education: Not on file    Highest education level: Not on file   Occupational History    Not on file   Tobacco Use    Smoking status: Current Every Day Smoker     Packs/day: 0.50     Years: 30.00     Pack years: 15.00     Types: Cigarettes    Smokeless tobacco: Never Used    Tobacco comment: was smoking 1 ppd   Vaping Use    Vaping Use: Never used   Substance and Sexual Activity    Alcohol use: Not Currently     Alcohol/week: 0.0 standard drinks    Drug use: Not Currently     Frequency: 2.0 times per week     Comment: 6 months ago    Sexual activity: Not Currently   Other Topics Concern    Not on file   Social History Narrative    Not on file     Social Determinants of Health     Financial Resource Strain: Medium Risk    Difficulty of Paying Living Expenses: Somewhat hard   Food Insecurity: No Food Insecurity    Worried About Running Out of Food in the Last Year: Never true    Tyler of Food in the Last Year: Never true   Transportation Needs:     Lack of Transportation (Medical): Not on file    Lack of Transportation (Non-Medical):  Not on file   Physical Activity:     Days of Exercise per Week: Not on file    Minutes of Exercise per Session: Not on file   Stress:     Feeling of Stress : Not on file   Social Connections:     Frequency of Communication with Friends and Family: Not on file    Frequency of Social Gatherings with Friends and Family: Not on file    Attends Moravian Services: Not on file    Active Member of Clubs or Organizations: Not on file    Attends Club or Organization Meetings: Not on file    Marital Status: Not on file   Intimate Partner Violence:     Fear of Current or Ex-Partner: Not on file    Emotionally Abused: Not on file    Physically Abused: Not on file    Sexually Abused: Not on file   Housing Stability:  Unable to Pay for Housing in the Last Year: Not on file    Number of Places Lived in the Last Year: Not on file    Unstable Housing in the Last Year: Not on file       TOBACCO:   reports that she has been smoking cigarettes. She has a 15.00 pack-year smoking history. She has never used smokeless tobacco.  ETOH:   reports previous alcohol use. Family History:   Family History   Problem Relation Age of Onset    Other Father     Cancer Maternal Grandmother     Heart Disease Paternal Grandmother     Esophageal Cancer Maternal Aunt     Arthritis Mother            Pt interventions:  Trained in relaxation strategies, Provided education, Provided education on PTSD symptoms and treatment options for evidence-based treatment (Cognitive Processing Therapy and Prolonged Exposure), Discussed potential barriers to change, Trained in effective parenting skills (positive reinforcement, routine, limit setting with consequences, time out, praise, mood management, sleep hygiene, social activities, pleasurable activities, physicial activities, problem solving), Established rapport, Emphasized self-care as important for managing overall health, Identified maladaptive thoughts, Identified relevant behavioral strategies for targeting negative thoughts  including pt being like her father , Explained relaxed breathing technique in detail and practiced this with pt in visit, Provided pt list of websites and several smartphone keith resources for further practicing guided meditations and breathing exercises and Problem-solving re: exit strategies         PLAN:   Review visit with stepsister   Continue to process and reframe thoughts       Patient scheduled to return on: Wednesday 3/9/2022 at 2 PM     Were changes or additions made to the treatment plan today?   YES []   NO []  Noted changes:                Pursuant to the emergency declaration under the [facility] waiver authority and the Coronavirus Preparedness and Response Supplemental Appropriations Act, this Virtual Visit was conducted, with patient's consent, to reduce the patient's risk of exposure to COVID-19 and provide continuity of care for an established patient. Services were provided through a video synchronous discussion virtually to substitute for in-person clinic visit.

## 2022-03-10 RX ORDER — LEVOFLOXACIN 500 MG/1
500 TABLET, FILM COATED ORAL DAILY
Qty: 5 TABLET | Refills: 0 | OUTPATIENT
Start: 2022-03-10 | End: 2022-03-15

## 2022-03-10 RX ORDER — PREDNISONE 20 MG/1
40 TABLET ORAL DAILY
Qty: 10 TABLET | Refills: 0 | OUTPATIENT
Start: 2022-03-10 | End: 2022-03-15

## 2022-03-11 ENCOUNTER — NURSE TRIAGE (OUTPATIENT)
Dept: OTHER | Facility: CLINIC | Age: 52
End: 2022-03-11

## 2022-03-11 NOTE — TELEPHONE ENCOUNTER
[facility] [employer] Subjective: Caller states \"states fainted yesterday\"     Current Symptoms: fainted yesterday hx copd thinks had loc a few seconds never done this before, has hx of copd and htn fell in hallway and hit chin,     Onset:  yesterday    Associated Symptoms: NA    Pain Severity: none    Temperature: none    What has been tried: nothing    LMP: NA Pregnant: NA    Recommended disposition: Go to ED/UCC Now (Or to Office with PCP Approval)     Office is closed so highly encouraged pt to go to ucc/er told it could be serious and could die if not evaluated, pt at first refused to go but said she would look into it    Care advice provided, patient verbalizes understanding; denies any other questions or concerns; instructed to call back for any new or worsening symptoms. Attention Provider: Thank you for allowing me to participate in the care of your patient. The patient was connected to triage in response to information provided to the ECC/PSC. Please do not respond through this encounter as the response is not directed to a shared pool.           Reason for Disposition   [1] Age > 50 years  AND [2] now alert and feels fine    Protocols used: [facility]

## 2022-03-12 DIAGNOSIS — R13.19 ESOPHAGEAL DYSPHAGIA: ICD-10-CM

## 2022-03-14 ENCOUNTER — TELEPHONE (OUTPATIENT)
Dept: FAMILY MEDICINE CLINIC | Age: 52
End: 2022-03-14

## 2022-03-14 RX ORDER — OMEPRAZOLE 40 MG/1
CAPSULE, DELAYED RELEASE ORAL
Qty: 60 CAPSULE | Refills: 2 | Status: SHIPPED | OUTPATIENT
Start: 2022-03-14 | End: 2022-07-13

## 2022-03-14 NOTE — TELEPHONE ENCOUNTER
Daphney Smiht is calling to [facility] Dr Kami Curry that she fainted on 03/10/22. She hit her chin. Patient states has no idea why she fainted. I offered appointment but patient declined. Pt states not sure why she fainted and felt appointment would be wasted due to unknown reasons of fainting. She did states is feeling fine at this time. I did let her know if she felt need to be seen to please call the office.

## 2022-03-16 ENCOUNTER — HOSPITAL ENCOUNTER (OUTPATIENT)
Dept: PSYCHIATRY | Age: 52
Setting detail: THERAPIES SERIES
Discharge: HOME | End: 2022-03-16
Payer: MEDICARE

## 2022-03-16 PROCEDURE — 90837 PSYTX W PT 60 MINUTES: CPT | Performed by: SOCIAL WORKER

## 2022-03-16 NOTE — PSYCHOTHERAPY
TELEHEALTH EVALUATION -- Audio/Visual (During CFZIS-56 public health emergency)     [facility]  Jen MARVIN Piña   3/16/2022  2:00 PM  Kady Dutta  1970  [id number]    Patient location is at their home address. Location of clinician is at [facility] located at [address] [place] [address]. Time spent with Patient: 60 minutes      Pt was provided informed consent for the [address] [facility] [facility]. Discussed with patient model of service to include the limits of confidentiality (i.e. abuse reporting, suicide intervention, etc.) and short-term intervention focused approach. Pt indicated understanding. S:  Pt and therapist engaged in check in and processed pt's weekend with her sister. Therapist and pt addressed any negative thoughts and discussed ways to avoid \"taking on everyone else's anxiety. \"  Therapist and pt discussed self-care and healthy emotional expression. Therapist offered psychoeducation to pt about using guided imagery to help with emotional regulation by entering a \"safe space\" in her mind. Therapist led pt in practicing using a safe space by guiding her to use her 5 senses to imagine her bedroom. Therapist and pt reflected on experience and emotions. Therapist and pt addressed pt's fears of fainting again. Therapist offered psychoeducation on the cognitive model and using cognitive techniques to address negative thoughts. Therapist used an example to practice connecting thoughts, feelings and behaviors.       O:  MSE:     Appearance    alert, cooperative  Appetite normal  Sleep disturbance Yes  Fatigue Yes  Loss of pleasure No  Impulsive behavior No  Speech    normal rate, normal volume and well articulated  Mood    Normal  Affect    normal affect  Thought Content    intact  Thought Process    linear, goal directed and coherent  Associations    logical connections  Insight    Good  Judgment    Intact  Orientation    oriented to person, place, time, and general circumstances  Memory    recent and remote memory intact  Attention/Concentration    intact  Morbid ideation No  Suicide Assessment    no suicidal ideation    A:  Pt presented to session as relaxed and tired, but engaged. Pt was able to identify some negative thoughts she has experienced over the last two weeks. Pt was able to be a support to her stepsister and manage her emotions there, but shut down and it became \"too much\" once she returned home. Pt would like to be better at not carrying others' emotions for them. Pt expressed an understanding at cognitive techniques and identified that she has some positive thoughts in her brain, but he negative ones were \"louder\".           Visit Diagnosis:   Post Traumatic Stress Disorder       History from Medical Record:        Diagnosis Date    Acute respiratory failure with hypoxia ([address] 10/16/2020    Alcohol withdrawal syndrome, with delirium ([address] 12/14/2019    Alcoholism ([address]     Anal warts     Anemia 10/2020    GI bleed    Anxiety     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24, the patient underwent 2 surgical resections without known recurrence 10/23/2020    at age 24    Closed fracture of lateral portion of left tibial plateau 30/58/0928    Condyloma     COPD (chronic obstructive pulmonary disease) ([address]     CO2 retainer, on Bipap at night for this, Dr. Pierre Meyers ( last visit 11/20/2020 and note on chart )    Depression      major depressive disorder, ptsd, anxiety    Dysphagia     GI bleed 10/2020    Hypertension     Memory loss     Oxygen dependent     USES  3L/MIN DAILY PRN AND NIGHTLY CONTINUOUSLY    Pain, joint, ankle and foot     Pancreatic lesion 10/2020    Dr. Raciel Mazariegos working up pt    Perianal wart     Peripheral neuropathy     Seizures ([address]     LAST SEIZURE 3/2020 - FEELS IT WAS FROM ALCOHOL WITHDRAWL    Tension headache     Under care of team 09/29/2021    neuro-Dr [facility]-last visit sep 2021    Under care of team 09/29/2021    pain management-Hamzah Martines-nery jimenez-last visit sep 2021    Under care of team     pulmonology-Dr Due[facility]as-[facility]-due for visit March 2022    Under care of team 09/29/2021    psych-bahnfeldt NP-telemed-last visit 10/ 2021    Under care of team 09/29/2021    [facility]-last visit aug 2021    Under care of team     NEPHROLOGY - DR. LEE    Wears glasses     Wears partial dentures     UPPER    Wellness examination     [facility], 2022     Medications:   Current Outpatient Medications   Medication Sig Dispense Refill    omeprazole (PRILOSEC) 40 MG delayed release capsule TAKE ONE CAPSULE BY MOUTH TWICE A DAY 60 capsule 2    CREON 08674-48485 units delayed release capsule TAKE ONE CAPSULE BY MOUTH THREE TIMES A DAY WITH MEALS 90 capsule 1    escitalopram (LEXAPRO) 20 MG tablet Take 1 tablet by mouth daily 30 tablet 1    chlorproMAZINE (THORAZINE) 10 MG tablet Take 1 tablet by mouth 3 times daily as needed (anxiety) 90 tablet 1    varenicline (CHANTIX) 0.5 MG tablet Take 1-2 tablets by mouth See Admin Instructions 0.5mg DAILY for 3 days followed by 0.5mg TWICE DAILY for 4 days followed by 1mg TWICE DAILY 57 tablet 0    varenicline (CHANTIX CONTINUING MONTH CHARISMA) 1 MG tablet Take 1 tablet by mouth 2 times daily Start this after finishing taper. 60 tablet 3    hydrOXYzine (ATARAX) 25 MG tablet Take 25 mg by mouth 3 times daily as needed for Itching      senna (SENOKOT) 8.6 MG tablet Take 1 tablet by mouth 2 times daily 60 tablet 11    lidocaine (XYLOCAINE) 5 % ointment Apply topically as needed.  30 g 1    sucralfate (CARAFATE) 1 GM tablet Take 1 tablet by mouth 4 times daily 120 tablet 3    sodium chloride 1 g tablet TAKE ONE TABLET BY MOUTH THREE TIMES A DAY (Patient taking differently: Take 1 g by mouth 4 times daily ) 90 tablet 11    busPIRone (BUSPAR) 15 MG tablet Take 15 mg by mouth three times daily 90 tablet 1    prazosin (MINIPRESS) 1 MG capsule Take 1 capsule by mouth nightly 30 capsule 3    mirtazapine (REMERON) 7.5 MG tablet Take 1 tablet by mouth nightly as needed (sleep) 30 tablet 3    carBAMazepine (TEGRETOL) 200 MG tablet TAKE ONE TABLET BY MOUTH THREE TIMES A DAY 90 tablet 5    metoclopramide (REGLAN) 5 MG tablet TAKE ONE TABLET BY MOUTH THREE TIMES A DAY 90 tablet 3    rOPINIRole (REQUIP) 1 MG tablet TAKE ONE TABLET BY MOUTH ONCE NIGHTLY 90 tablet 1    topiramate (TOPAMAX) 50 MG tablet Take 1.5 tab bid for 2 wk, then two tab bid (Patient taking differently: Take 50 mg by mouth 2 times daily ) 120 tablet 3    fluticasone-umeclidin-vilant (TRELEGY ELLIPTA) 100-62.5-25 MCG/INH AEPB Inhale 1 puff into the lungs daily 1 each 5    denosumab (PROLIA) 60 MG/ML SOSY SC injection Inject 1 mL into the skin every 6 months 1 mL 1    pregabalin (LYRICA) 200 MG capsule Take 1 capsule by mouth 3 times daily for 180 days. 90 capsule 5    amLODIPine (NORVASC) 5 MG tablet TAKE ONE TABLET BY MOUTH DAILY 90 tablet 1    simethicone (MYLICON) 80 MG chewable tablet Take 1 tablet by mouth 4 times daily as needed for Flatulence 180 tablet 3    albuterol sulfate HFA (VENTOLIN HFA) 108 (90 Base) MCG/ACT inhaler Inhale 2 puffs into the lungs 4 times daily as needed for Wheezing 1 Inhaler 5    vitamin D (ERGOCALCIFEROL) 78718 units CAPS capsule Take 1 capsule by mouth once a week 12 capsule 1    folic acid (FOLVITE) 1 MG tablet Take 1 tablet by mouth daily 90 tablet 1     No current facility-administered medications for this encounter.        Social History:   Social History     Socioeconomic History    Marital status: Single     Spouse name: Not on file    Number of children: Not on file    Years of education: Not on file    Highest education level: Not on file   Occupational History    Not on file   Tobacco Use    Smoking status: Current Every Day Smoker     Packs/day: 0.50     Years: 30.00     Pack years: 15.00     Types: Cigarettes    Smokeless tobacco: Never Used    Tobacco comment: was smoking 1 ppd   Vaping Use    Vaping Use: Never used   Substance and Sexual Activity    Alcohol use: Not Currently     Alcohol/week: 0.0 standard drinks    Drug use: Not Currently     Frequency: 2.0 times per week     Comment: 6 months ago    Sexual activity: Not Currently   Other Topics Concern    Not on file   Social History Narrative    Not on file     Social Determinants of Health     Financial Resource Strain: Medium Risk    Difficulty of Paying Living Expenses: Somewhat hard   Food Insecurity: No Food Insecurity    Worried About Running Out of Food in the Last Year: Never true    [place] of Food in the Last Year: Never true   Transportation Needs:     Lack of Transportation (Medical): Not on file    Lack of Transportation (Non-Medical): Not on file   Physical Activity:     Days of Exercise per Week: Not on file    Minutes of Exercise per Session: Not on file   Stress:     Feeling of Stress : Not on file   Social Connections:     Frequency of Communication with Friends and Family: Not on file    Frequency of Social Gatherings with Friends and Family: Not on file    Attends Confucianist Services: Not on file    Active Member of [address] [place] [address] [employer] or Organizations: Not on file    Attends Club or Organization Meetings: Not on file    Marital Status: Not on file   Intimate Partner Violence:     Fear of Current or Ex-Partner: Not on file    Emotionally Abused: Not on file    Physically Abused: Not on file    Sexually Abused: Not on file   Housing Stability:     Unable to Pay for Housing in the Last Year: Not on file    Number of [place] in the Last Year: Not on file    Unstable Housing in the Last Year: Not on file       TOBACCO:   reports that she has been smoking cigarettes. She has a 15.00 pack-year smoking history. She has never used smokeless tobacco.  ETOH:   reports previous alcohol use.     Family History:   Family History   Problem Relation Age of Onset    Other Father     Cancer Maternal Grandmother     Heart Disease Paternal Grandmother     Esophageal Cancer Maternal Aunt     Arthritis Mother            Pt interventions:  Trained in relaxation strategies, Provided education on PTSD symptoms and treatment options for evidence-based treatment (Cognitive Processing Therapy and Prolonged Exposure), Discussed and problem-solved barriers in adhering to behavioral change plan, Discussed use of imagery, distractions, relaxation, mood management, communication training, questioning unhelpful thinking, problem-solving, and behavioral activation to manage pain, Established rapport, Conducted functional assessment, Supportive techniques and CBT to target negative thought s        PLAN:   Cognitive techniques (continue examples)  Cognitive distortions  Reframing negative thoughts     Patient scheduled to return on: Wednesday 3/22/2022 at 2 PM    Were changes or additions made to the treatment plan today? YES []   NO [x]  Noted changes:                Pursuant to the emergency declaration under the [facility] [place] waiver authority and the [employer] Act, this Virtual Visit was conducted, with patient's consent, to reduce the patient's risk of exposure to COVID-19 and provide continuity of care for an established patient. Services were provided through a video synchronous discussion virtually to substitute for in-person clinic visit.

## 2022-03-17 ENCOUNTER — TELEMEDICINE (OUTPATIENT)
Dept: NEUROLOGY | Age: 52
End: 2022-03-17
Payer: MEDICARE

## 2022-03-17 DIAGNOSIS — F10.11 HISTORY OF ALCOHOL ABUSE: ICD-10-CM

## 2022-03-17 DIAGNOSIS — G40.909 SEIZURE DISORDER (HCC): ICD-10-CM

## 2022-03-17 DIAGNOSIS — C71.9 ASTROCYTOMA (HCC): ICD-10-CM

## 2022-03-17 DIAGNOSIS — G44.211 INTRACTABLE EPISODIC TENSION-TYPE HEADACHE: Primary | ICD-10-CM

## 2022-03-17 DIAGNOSIS — G62.9 PERIPHERAL POLYNEUROPATHY: ICD-10-CM

## 2022-03-17 PROCEDURE — G8427 DOCREV CUR MEDS BY ELIG CLIN: HCPCS | Performed by: PSYCHIATRY & NEUROLOGY

## 2022-03-17 PROCEDURE — 99214 OFFICE O/P EST MOD 30 MIN: CPT | Performed by: PSYCHIATRY & NEUROLOGY

## 2022-03-17 PROCEDURE — 3017F COLORECTAL CA SCREEN DOC REV: CPT | Performed by: PSYCHIATRY & NEUROLOGY

## 2022-03-17 NOTE — PROGRESS NOTES
NEUROLOGY FOLLOW-UP  Patient Name:  Raul Grande  :   1970  Clinic Visit Date: 3/17/2022  LOV: 21      Dear Dr. Stephanie Blackman MD     I saw Ms. Raul Grande in virtual visit follow-up today in continuation of neurologic care. As you know, she  is a 45 y.o. female with hx of HTN, COPD, astrocytoma resection x2 (), seizure disorder, migraine headaches. She comes to clinic stating that she has not had any seizure activity since 2020 but she has been having headaches occurring frequently and these are described as constant pressure-like headaches with paroxysms of throbbing headaches with associated photophobia and nausea. Denied vomiting. Headaches usually last 4-6 hours but for the past few days; headaches are lasting for >1 day. She has been on Topamax 50 mg twice daily and she tried increasing the dose and she has itching of the hands and she stopped using Topamax. She also stated that she has had episode of syncope about 2 weeks ago and she did not have any jerking or stiffening activity. No report of tongue bite or bladder incontinence. She has not missed Tegretol dose; takes it 200 mg 3 times daily. She was treated with Zosyn. She was continued on Tegretol 200 mg tid for seizure prophylaxis. She also had hx of chronic L4-L5 radiculopathy with osteoporosis with MRI lumbar spine demonstrating compression deformity of L5 superior endplate. She has been on baclofen 10 mg 3 times daily. She also has been on Lyrica 200 mg tid for peripheral polyneuropathy. Also on Topamax 50 mg twice daily for migraine headaches. Clinic stating that she has been having continuous and persistent migrainous headache for the past few days. Also admits to having photophobia and phonophobia with it. She also stated that she is having nystagmus and was evaluated by ophthalmologist and it was attributed to alcohol abuse.   She also has chronic alcoholism with chronic pancreatitis and has been sober since 2020. History is also significant for astrocytoma resection x2 (1989); affective disorder with PTSD, depression/anxiety and has been on BuSpar. All items selected indicate a positive finding. Those items not selected are negative. Constitutional [] Weight loss/gain   [] Fatigue  [] Fever/Chills   HEENT [] Hearing Loss  [] Visual Disturbance  [] Tinnitus  [] Eye pain   Respiratory [] Shortness of Breath  [] Cough  [] Snoring   Cardiovascular [] Chest Pain  [] Palpitations  [] Lightheaded   GI [] Constipation  [] Diarrhea  [] Swallowing change    [] Urinary Frequency  [] Urinary Urgency   Musculoskeletal [] Neck pain  [x] Back pain  [] Muscle pain  [] Restless legs   Dermatologic [] Skin changes   Neurologic [] Memory loss/confusion  [] Seizures  [] Trouble walking or imbalance  [] Dizziness  [] Weakness  [] Numbness  [] Tremors  [x] sleep disturbance   [x] Headaches on awakening   [] Light Sensitivity  [] Sound Sensitivity   Endocrinology []Excessive thirst  []Excessive hunger   Psychiatric [x] Anxiety/Depression; denied suicidal ideations  [] Hallucination   Allergy/immunology []Hives/environmental allergies   Hematologic/lymph [] Abnormal bleeding  [] Abnormal bruising       Current Outpatient Medications on File Prior to Visit   Medication Sig Dispense Refill    omeprazole (PRILOSEC) 40 MG delayed release capsule TAKE ONE CAPSULE BY MOUTH TWICE A DAY 60 capsule 2    CREON 20806-18712 units delayed release capsule TAKE ONE CAPSULE BY MOUTH THREE TIMES A DAY WITH MEALS 90 capsule 1    escitalopram (LEXAPRO) 20 MG tablet Take 1 tablet by mouth daily 30 tablet 1    chlorproMAZINE (THORAZINE) 10 MG tablet Take 1 tablet by mouth 3 times daily as needed (anxiety) 90 tablet 1    varenicline (CHANTIX CONTINUING MONTH CHARISMA) 1 MG tablet Take 1 tablet by mouth 2 times daily Start this after finishing taper.  60 tablet 3    hydrOXYzine (ATARAX) 25 MG tablet Take 25 mg by mouth 3 times daily as needed for Itching      senna (SENOKOT) 8.6 MG tablet Take 1 tablet by mouth 2 times daily 60 tablet 11    lidocaine (XYLOCAINE) 5 % ointment Apply topically as needed. 30 g 1    sodium chloride 1 g tablet TAKE ONE TABLET BY MOUTH THREE TIMES A DAY (Patient taking differently: Take 1 g by mouth 4 times daily ) 90 tablet 11    busPIRone (BUSPAR) 15 MG tablet Take 15 mg by mouth three times daily 90 tablet 1    prazosin (MINIPRESS) 1 MG capsule Take 1 capsule by mouth nightly 30 capsule 3    mirtazapine (REMERON) 7.5 MG tablet Take 1 tablet by mouth nightly as needed (sleep) 30 tablet 3    carBAMazepine (TEGRETOL) 200 MG tablet TAKE ONE TABLET BY MOUTH THREE TIMES A DAY 90 tablet 5    metoclopramide (REGLAN) 5 MG tablet TAKE ONE TABLET BY MOUTH THREE TIMES A DAY 90 tablet 3    rOPINIRole (REQUIP) 1 MG tablet TAKE ONE TABLET BY MOUTH ONCE NIGHTLY 90 tablet 1    topiramate (TOPAMAX) 50 MG tablet Take 1.5 tab bid for 2 wk, then two tab bid (Patient taking differently: Take 50 mg by mouth 2 times daily ) 120 tablet 3    fluticasone-umeclidin-vilant (TRELEGY ELLIPTA) 100-62.5-25 MCG/INH AEPB Inhale 1 puff into the lungs daily 1 each 5    pregabalin (LYRICA) 200 MG capsule Take 1 capsule by mouth 3 times daily for 180 days.  90 capsule 5    amLODIPine (NORVASC) 5 MG tablet TAKE ONE TABLET BY MOUTH DAILY 90 tablet 1    simethicone (MYLICON) 80 MG chewable tablet Take 1 tablet by mouth 4 times daily as needed for Flatulence 180 tablet 3    albuterol sulfate HFA (VENTOLIN HFA) 108 (90 Base) MCG/ACT inhaler Inhale 2 puffs into the lungs 4 times daily as needed for Wheezing 1 Inhaler 5    vitamin D (ERGOCALCIFEROL) 15071 units CAPS capsule Take 1 capsule by mouth once a week 12 capsule 1    folic acid (FOLVITE) 1 MG tablet Take 1 tablet by mouth daily 90 tablet 1    varenicline (CHANTIX) 0.5 MG tablet Take 1-2 tablets by mouth See Admin Instructions 0.5mg DAILY for 3 days followed by 0.5mg TWICE DAILY for 4 days followed by 1mg TWICE DAILY 57 tablet 0    sucralfate (CARAFATE) 1 GM tablet Take 1 tablet by mouth 4 times daily (Patient not taking: Reported on 3/17/2022) 120 tablet 3    denosumab (PROLIA) 60 MG/ML SOSY SC injection Inject 1 mL into the skin every 6 months (Patient not taking: Reported on 3/17/2022) 1 mL 1     No current facility-administered medications on file prior to visit. Allergies: Paris Hahn has No Known Allergies.     Past Medical History:   Diagnosis Date    Acute respiratory failure with hypoxia ([address] 10/16/2020    Alcohol withdrawal syndrome, with delirium ([address] 12/14/2019    Alcoholism ([address]     Anal warts     Anemia 10/2020    GI bleed    Anxiety     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24, the patient underwent 2 surgical resections without known recurrence 10/23/2020    at age 24    Closed fracture of lateral portion of left tibial plateau 85/15/1384    Condyloma     COPD (chronic obstructive pulmonary disease) ([facility] [address]     CO2 retainer, on Bipap at night for this, Dr. Martha Ye ( last visit 11/20/2020 and note on chart )    Depression      major depressive disorder, ptsd, anxiety    Dysphagia     GI bleed 10/2020    Hypertension     Memory loss     Oxygen dependent     USES  3L/MIN DAILY PRN AND NIGHTLY CONTINUOUSLY    Pain, joint, ankle and foot     Pancreatic lesion 10/2020    Dr. Manuella Dubin working up pt    Perianal wart     Peripheral neuropathy     Seizures ([address]     LAST SEIZURE 3/2020 - FEELS IT WAS FROM ALCOHOL WITHDRAWL    Tension headache     Under care of team 09/29/2021    neuro-Dr Coyle-[facility] ct-last visit sep 2021    Under care of team 09/29/2021    pain management-Hamzah Martines[facility] [place]-last visit sep 2021    Under care of team     pulmonology-Dr Dueñas-[facility]-due for visit March 2022    Under care of team 09/29/2021    psych-bahnfeldt NP-telemed-last visit 10/ 2021    Under care of team 09/29/2021    Veronica Frank visit aug 2021    Under care of team     NEPHROLOGY - DR. LEE    Wears glasses     Wears partial dentures     UPPER    Wellness examination     pcp-Ludivina Grimm-[facility]-last visit Jan 5, 2022       Past Surgical History:   Procedure Laterality Date    ANUS SURGERY N/A 01/25/2022    EXCISION AND FULGURATION, ANAL, VAGINAL AND PERIANAL WARTS WITH CO2 LASER, FORTEC performed by Janak Levine MD at [address] [facility] 2    COLONOSCOPY N/A 10/22/2020    COLONOSCOPY DIAGNOSTIC performed by Francesca Turcios MD at [address] [facility] COLONOSCOPY N/A 11/19/2021    COLONOSCOPY W/ ENDOSCOPIC MUCOSAL RESECTION performed by Francesca Turcios MD at [address] [place] [address]  07/28/2021    CT ABSCESS DRAIN SUBCUTANEOUS 7/28/2021 [facility] CT SCAN    ENDOSCOPIC ULTRASOUND (LOWER) N/A 12/09/2020    ENDOSCOPIC ULTRASOUND, UPPER WITH LINEAR SCOPE FOR BIOPSY OF MASS ON HEAD OF PANCREAS performed by Delfino Romero MD at [address] [place] [address] (UPPER)  02/09/2022    ENDOSCOPIC ULTRASOUND, EGD - GI SCHEDULED,EGD STENT REMOVAL    ERCP N/A 08/11/2021    ERCP STENT INSERTION performed by Loyda Singh MD at [facility]    ERCP  08/11/2021    ERCP SPHINCTER/PAPILLOTOMY performed by Loyda Singh MD at [facility]    ERCP N/A 10/21/2021    ERCP STENT REMOVAL/EXCHANGE performed by Loyda Singh MD at [address] [place] [address] ERCP  10/21/2021    ERCP STONE REMOVAL performed by Loyda Singh MD at [address] [place] [address] ERCP  10/21/2021    ERCP ENDOSCOPIC RETROGRADE CHOLANGIOPANCREATOGRAPHY DIRECT VISUALIZATION performed by Loyda Singh MD at [address] [facility] Left 07/03/2013    ORIF tibial plateau    FRACTURE SURGERY Right     small finger metacarpal fracture    HAND SURGERY      HYSTERECTOMY  2003    SPINE SURGERY N/A 09/10/2021    KYPHOPLASTY lumbar L5 performed by Kurtis Zarate MD at [address] [facility] 10/22/2020    EGD BIOPSY performed by Paz Valerio MD at [address] [place] [address] N/A 04/05/2021    EGD BIOPSY performed by Paz Valerio MD at [address] [place] [address] N/A 09/08/2021    ENDOSCOPIC ULTRASOUND, LINEAR SCOPE performed by Harsha Ritter MD at [place] [address] N/A 2/9/2022    ENDOSCOPIC ULTRASOUND, EGD - GI SCHEDULED performed by Belén Campos MD at [address] [place] [address]  2/9/2022    EGD STENT REMOVAL performed by Belén Campos MD at [address] [place] [address] History: Abran Yeboah  reports that she has been smoking cigarettes. She has a 15.00 pack-year smoking history. She has never used smokeless tobacco. She reports previous alcohol use. She reports previous drug use. Frequency: 2.00 times per week. Family History   Problem Relation Age of Onset    Other Father     Cancer Maternal Grandmother     Heart Disease Paternal Grandmother     Esophageal Cancer Maternal Aunt     Arthritis Mother      On exam    NEUROLOGIC EXAMINATION  GENERAL  Appears comfortable and in no distress   HEENT  NC/ AT   NECK  Supple and no bruits heard   MENTAL STATUS:  Alert, oriented, intact memory, no confusion, normal speech, normal language, no hallucination or delusion; appropriate affect    CRANIAL NERVES: II     -      PERRLA, Fundi reveal intact venous pulsations;  Visual fields intact to confrontation  III,IV,VI -  Horizontal nystagmus bilaterlly, no afferent defect, no CAMMIE, no ptosis  V     -     Normal facial sensation  VII    -     Normal facial symmetry  VIII   -     Intact hearing  IX,X -     Symmetrical palate  XI    -     Symmetrical shoulder shrug  XII   -     Midline tongue, no atrophy    MOTOR FUNCTION:  significant for good strength of grade 5/5 in bilateral proximal and distal muscle groups of both upper and lower extremities with normal bulk, normal tone and no involuntary movements, no tremor   SENSORY

## 2022-03-17 NOTE — PROGRESS NOTES
Constitutional [] Weight loss/gain   [] Fatigue  [] Fever/Chills   HEENT [] Hearing Loss  [x] Visual Disturbance  [] Tinnitus  [] Eye pain   Respiratory [] Shortness of Breath  [] Cough  [] Snoring   Cardiovascular [] Chest Pain  [] Palpitations  [x] Lightheaded   GI [] Constipation  [] Diarrhea  [] Swallowing change  [] Nausea/vomiting    [] Urinary Frequency  [] Urinary Urgency   Musculoskeletal [x] Neck pain  [x] Back pain  [] Muscle pain  [] Restless legs   Dermatologic [] Skin changes   Neurologic [] Memory loss/confusion  [] Seizures  [] Trouble walking or imbalance  [x] Dizziness  [] Sleep disturbance  [x] Weakness  [x] Numbness  [] Tremors  [] Speech Difficulty  [x] Headaches  [x] Light Sensitivity  [x] Sound Sensitivity   Endocrinology []Excessive thirst  []Excessive hunger   Psychiatric [x] Anxiety/Depression  [] Hallucination   Allergy/immunology []Hives/environmental allergies   Hematologic/lymph [] Abnormal bleeding  [x] Abnormal bruising

## 2022-03-18 ENCOUNTER — TELEPHONE (OUTPATIENT)
Dept: NEUROLOGY | Age: 52
End: 2022-03-18

## 2022-03-22 ENCOUNTER — HOSPITAL ENCOUNTER (OUTPATIENT)
Age: 52
Discharge: HOME | End: 2022-03-22
Payer: MEDICARE

## 2022-03-22 ENCOUNTER — OFFICE VISIT (OUTPATIENT)
Dept: PULMONOLOGY | Age: 52
End: 2022-03-22
Payer: MEDICARE

## 2022-03-22 VITALS
BODY MASS INDEX: 24.72 KG/M2 | OXYGEN SATURATION: 95 % | HEART RATE: 84 BPM | DIASTOLIC BLOOD PRESSURE: 104 MMHG | WEIGHT: 148.4 LBS | SYSTOLIC BLOOD PRESSURE: 160 MMHG | TEMPERATURE: 98 F | HEIGHT: 65 IN

## 2022-03-22 DIAGNOSIS — J96.11 CHRONIC RESPIRATORY FAILURE WITH HYPOXIA AND HYPERCAPNIA (HCC): Primary | ICD-10-CM

## 2022-03-22 DIAGNOSIS — G40.909 SEIZURE DISORDER (HCC): ICD-10-CM

## 2022-03-22 DIAGNOSIS — G44.211 INTRACTABLE EPISODIC TENSION-TYPE HEADACHE: ICD-10-CM

## 2022-03-22 DIAGNOSIS — J96.12 CHRONIC RESPIRATORY FAILURE WITH HYPOXIA AND HYPERCAPNIA (HCC): Primary | ICD-10-CM

## 2022-03-22 LAB — CARBAMAZEPINE LEVEL: 6.2 UG/ML (ref 4–12)

## 2022-03-22 PROCEDURE — 3017F COLORECTAL CA SCREEN DOC REV: CPT | Performed by: INTERNAL MEDICINE

## 2022-03-22 PROCEDURE — 80156 ASSAY CARBAMAZEPINE TOTAL: CPT

## 2022-03-22 PROCEDURE — G8482 FLU IMMUNIZE ORDER/ADMIN: HCPCS | Performed by: INTERNAL MEDICINE

## 2022-03-22 PROCEDURE — 80157 ASSAY CARBAMAZEPINE FREE: CPT

## 2022-03-22 PROCEDURE — G8427 DOCREV CUR MEDS BY ELIG CLIN: HCPCS | Performed by: INTERNAL MEDICINE

## 2022-03-22 PROCEDURE — G8420 CALC BMI NORM PARAMETERS: HCPCS | Performed by: INTERNAL MEDICINE

## 2022-03-22 PROCEDURE — 36415 COLL VENOUS BLD VENIPUNCTURE: CPT

## 2022-03-22 PROCEDURE — 4004F PT TOBACCO SCREEN RCVD TLK: CPT | Performed by: INTERNAL MEDICINE

## 2022-03-22 PROCEDURE — 99214 OFFICE O/P EST MOD 30 MIN: CPT | Performed by: INTERNAL MEDICINE

## 2022-03-22 ASSESSMENT — SLEEP AND FATIGUE QUESTIONNAIRES
ESS TOTAL SCORE: 9
HOW LIKELY ARE YOU TO NOD OFF OR FALL ASLEEP WHILE LYING DOWN TO REST IN THE AFTERNOON WHEN CIRCUMSTANCES PERMIT: 1
HOW LIKELY ARE YOU TO NOD OFF OR FALL ASLEEP WHILE SITTING QUIETLY AFTER LUNCH WITHOUT ALCOHOL: 1
HOW LIKELY ARE YOU TO NOD OFF OR FALL ASLEEP WHILE SITTING AND TALKING TO SOMEONE: 1
HOW LIKELY ARE YOU TO NOD OFF OR FALL ASLEEP WHILE WATCHING TV: 1
HOW LIKELY ARE YOU TO NOD OFF OR FALL ASLEEP IN A CAR, WHILE STOPPED FOR A FEW MINUTES IN TRAFFIC: 2
HOW LIKELY ARE YOU TO NOD OFF OR FALL ASLEEP WHILE SITTING INACTIVE IN A PUBLIC PLACE: 2
HOW LIKELY ARE YOU TO NOD OFF OR FALL ASLEEP WHEN YOU ARE A PASSENGER IN A CAR FOR AN HOUR WITHOUT A BREAK: 1
HOW LIKELY ARE YOU TO NOD OFF OR FALL ASLEEP WHILE SITTING AND READING: 0

## 2022-03-22 NOTE — PROGRESS NOTES
Starr Hernandezjerald  3/22/2022    Chief Complaint   Patient presents with    COPD     1 yr f/u    Chronic respiratory failure  COPD  Smoking  Alcohol abuse  Dorie Hua has chronic obstructive lung disease due to chronic bronchitis and emphysema causing chronic respiratory failure. She unfortunately continues smoke in spite of repeated advice to the contrary she has she is still smoking 1/2 pack a day. He will try Chantix but has not been successful. He was also at 1 time advised to use CPAP but she denies using that even now. She is a CO2 retainer. She has no evidence of any morning headaches and headaches at night due to computer retention. She does have home oxygen that she uses at night. Patient does have home oxygen. Recommended that she should have a portable oxygen concentrator which will benefit him for her further. No evidence of an acute exacerbation no edema no DVT no chest pain sputum is clear    She has hypertension that is under good control no angina no PND          Review of Systems -unchanged  General ROS: negative for - chills, fatigue, fever or weight loss  ENT ROS: negative for - headaches, oral lesions or sore throat  Cardiovascular ROS: no chest pain , orthopnea or pnd   Gastrointestinal ROS: no abdominal pain, change in bowel habits, or black or bloody stools  Skin - no rash   Neuro - no blurry vision , no loc . No focal weakness   msk - no jt tenderness or swelling    Vascular - no claudication , rest completed and negative   Lymphatic - complete and negative   Hematology - oncology - complete and negative   Allergy immunology - complete and negative    no burning or hematuria       LUNG CANCER SCREENING     1. CRITERIA MET    []     CT ORDERED  []      2. CRITERIA NOT MET   [x]      3. REFUSED                    []        REASON CRITERIA NOT MET     1. SMOKING LESS THAN 30 PY  []      2. AGE LESS THAN 55 or GREATER 77 YEARS  []      3.  QUIT SMOKING 15 YEARS OR GREATER []      4. RECENT CT WITH IN 11 MONTHS    []      5. LIFE EXPECTANCY < 5 YEARS   []      6.  SIGNS  AND SYMPTOMS OF LUNG CANCER   []           Immunization   Immunization History   Administered Date(s) Administered    COVID-19, Pfizer Purple top, DILUTE for use, 12+ yrs, 30mcg/0.3mL dose 03/26/2021, 04/23/2021, 11/04/2021    Influenza Vaccine, unspecified formulation 10/18/2018    Influenza Virus Vaccine 11/01/2017, 10/18/2018, 09/10/2019, 10/01/2020    Influenza, MDCK Quadv, IM, PF (Flucelvax 2 yrs and older) 10/04/2021    Influenza, Buelah Conrad, IM, (6 mo and older Fluzone, Flulaval, Fluarix and 3 yrs and older Afluria) 09/10/2019    MMR 09/27/2006    Pneumococcal Polysaccharide ([id number]) 12/28/2020, 11/04/2021    Tdap (Boostrix, Adacel) 12/28/2020    Zoster Recombinant (Shingrix) 11/04/2021        Pneumococcal Vaccine     [] Up to date    [x] Indicated   [] Refused  [] Contraindicated       Influenza Vaccine   [] Up to date    [x] Indicated   [] Refused  [] Contraindicated       PAST MEDICAL HISTORY:         Diagnosis Date    Acute respiratory failure with hypoxia ([address] 10/16/2020    Alcohol withdrawal syndrome, with delirium ([address] 12/14/2019    Alcoholism ([address]     Anal warts     Anemia 10/2020    GI bleed    Anxiety     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24, the patient underwent 2 surgical resections without known recurrence 10/23/2020    at age 24    Closed fracture of lateral portion of left tibial plateau 42/15/1928    Condyloma     COPD (chronic obstructive pulmonary disease) ([address]     CO2 retainer, on Bipap at night for this, Dr. Martha Ye ( last visit 11/20/2020 and note on chart )    Depression      major depressive disorder, ptsd, anxiety    Dysphagia     GI bleed 10/2020    Hypertension     Memory loss     Oxygen dependent     USES  3L/MIN DAILY PRN AND NIGHTLY CONTINUOUSLY    Pain, joint, ankle and foot     Pancreatic lesion 10/2020    Dr. Manuella Dubin working up pt    Perianal wart     Peripheral neuropathy     Seizures ([facility]     LAST SEIZURE 3/2020 - FEELS IT WAS FROM ALCOHOL WITHDRAWL    Tension headache     Under care of team 09/29/2021    [facility]    Under care of team 09/29/2021    pain management-Hamzah Martines-[facility] ramirezia-last visit sep 2021    Under care of team     pulmonology-Dr Due[facility]as-[facility]-due for visit March 2022    Under care of team 09/29/2021    psych-bahnfeldt NP-telemed-last visit 10/ 2021    Under care of team 09/29/2021    [facility]-last visit aug 2021    Under care of team     NEPHROLOGY - DR. LEE    Wears glasses     Wears partial dentures     UPPER    Wellness examination     [facility] Jan 5, 2022       Family History:       Problem Relation Age of Onset    Other Father     Cancer Maternal Grandmother     Heart Disease Paternal Grandmother     Esophageal Cancer Maternal Aunt     Arthritis Mother        SURGICAL HISTORY:   Past Surgical History:   Procedure Laterality Date    ANUS SURGERY N/A 01/25/2022    EXCISION AND FULGURATION, ANAL, VAGINAL AND PERIANAL WARTS WITH CO2 LASER, FORTEC performed by Carly Roque MD at [facility] 2    COLONOSCOPY N/A 10/22/2020    COLONOSCOPY DIAGNOSTIC performed by Pako Murguia MD at  [place] [address] COLONOSCOPY N/A 11/19/2021    COLONOSCOPY W/ ENDOSCOPIC MUCOSAL RESECTION performed by Pako Murguia MD at [address]  07/28/2021    CT ABSCESS DRAIN SUBCUTANEOUS 7/28/2021 [facility] CT SCAN    ENDOSCOPIC ULTRASOUND (LOWER) N/A 12/09/2020    ENDOSCOPIC ULTRASOUND, UPPER WITH LINEAR SCOPE FOR BIOPSY OF MASS ON HEAD OF PANCREAS performed by Chivo Lewis MD at [address] [place] [address] (UPPER)  02/09/2022    ENDOSCOPIC ULTRASOUND, EGD - GI SCHEDULED,EGD STENT REMOVAL    ERCP N/A 08/11/2021    ERCP STENT INSERTION performed by [facility] Daugherty MD at [facility]    ERCP  08/11/2021    ERCP SPHINCTER/PAPILLOTOMY performed by Bryant Daugherty MD at [facility]    ERCP N/A 10/21/2021    ERCP STENT REMOVAL/EXCHANGE performed by Bryant Daugherty MD at [address] ERCP  10/21/2021    ERCP STONE REMOVAL performed by Bryant Daugherty MD at [facility] ERCP  10/21/2021    ERCP ENDOSCOPIC RETROGRADE CHOLANGIOPANCREATOGRAPHY DIRECT VISUALIZATION performed by Bryant Daugherty MD at [address] Left 07/03/2013    ORIF tibial plateau    FRACTURE SURGERY Right     small finger metacarpal fracture    HAND SURGERY      HYSTERECTOMY  2003    SPINE SURGERY N/A 09/10/2021    KYPHOPLASTY lumbar L5 performed by Rafiq Claros MD at [address] 10/22/2020    EGD BIOPSY performed by Ryan Ling MD at [address] [place] [address] 04/05/2021    EGD BIOPSY performed by Ryan Ling MD at [address] [place] [address] N/A 09/08/2021    ENDOSCOPIC ULTRASOUND, LINEAR SCOPE performed by Azul Mena MD at [place] [address] N/A 2/9/2022    ENDOSCOPIC ULTRASOUND, EGD - GI SCHEDULED performed by Bryant Daugherty MD at [address]  2/9/2022    EGD STENT REMOVAL performed by Bryant Daugherty MD at [place] [address]              Not in a hospital admission. No Known Allergies  Social History     Tobacco Use   Smoking Status Current Every Day Smoker    Packs/day: 0.50    Years: 30.00    Pack years: 15.00    Types: Cigarettes   Smokeless Tobacco Never Used   Tobacco Comment    was smoking 1 ppd     Prior to Admission medications    Medication Sig Start Date End Date Taking?  Authorizing Provider   verapamil (CALAN SR) 120 MG extended release tablet Take 1 tablet by mouth daily 3/17/22  Yes Woody Pavon MD   omeprazole (PRILOSEC) 40 MG delayed release capsule TAKE ONE CAPSULE BY MOUTH TWICE A DAY 3/14/22  Yes Daniel Dawson MD   CREON [address]387 units delayed release capsule TAKE ONE CAPSULE BY MOUTH THREE TIMES A DAY WITH MEALS 2/14/22  Yes Ludivina Metzger MD   escitalopram (LEXAPRO) 20 MG tablet Take 1 tablet by mouth daily 2/11/22  Yes LIO Banks NP   chlorproMAZINE (THORAZINE) 10 MG tablet Take 1 tablet by mouth 3 times daily as needed (anxiety) 2/11/22  Yes LOI Banks NP   varenicline (CHANTIX) 0.5 MG tablet Take 1-2 tablets by mouth See Admin Instructions 0.5mg DAILY for 3 days followed by 0.5mg TWICE DAILY for 4 days followed by 1mg TWICE DAILY 2/9/22  Yes Sherin Roland MD   varenicline (CHANTIX CONTINUING MONTH PAK) 1 MG tablet Take 1 tablet by mouth 2 times daily Start this after finishing taper. 2/9/22  Yes Ludivina Metzger MD   hydrOXYzine (ATARAX) 25 MG tablet Take 25 mg by mouth 3 times daily as needed for Itching   Yes Historical Provider, MD   senna (SENOKOT) 8.6 MG tablet Take 1 tablet by mouth 2 times daily 1/25/22 1/25/23 Yes Ebonie Keyes,    lidocaine (XYLOCAINE) 5 % ointment Apply topically as needed.  1/25/22  Yes Ebonie Keyes,    sucralfate (CARAFATE) 1 GM tablet Take 1 tablet by mouth 4 times daily 1/21/22  Yes Daniel Dawson MD   sodium chloride 1 g tablet TAKE ONE TABLET BY MOUTH THREE TIMES A DAY  Patient taking differently: Take 1 g by mouth 4 times daily  1/20/22  Yes Charlie Connell MD   busPIRone (BUSPAR) 15 MG tablet Take 15 mg by mouth three times daily 1/11/22  Yes LOI Banks NP   prazosin (MINIPRESS) 1 MG capsule Take 1 capsule by mouth nightly 1/11/22  Yes LOI Banks NP   mirtazapine (REMERON) 7.5 MG tablet Take 1 tablet by mouth nightly as needed (sleep) 1/11/22  Yes LOI Banks NP   carBAMazepine (TEGRETOL) 200 MG tablet TAKE ONE TABLET BY MOUTH THREE TIMES A DAY 1/10/22  Yes Ludivina Andrei Metzger MD   metoclopramide (REGLAN) 5 MG tablet TAKE ONE TABLET BY MOUTH THREE TIMES A DAY 1/10/22  Yes Nancy Reed MD   rOPINIRole (REQUIP) 1 MG tablet TAKE ONE TABLET BY MOUTH ONCE NIGHTLY 1/5/22  Yes Ludivina Hammond MD   topiramate (TOPAMAX) 50 MG tablet Take 1.5 tab bid for 2 wk, then two tab bid  Patient taking differently: Take 50 mg by mouth 2 times daily  12/22/21  Yes Jailene Leonard MD   fluticasone-umeclidin-vilant (TRELEGY ELLIPTA) 100-62.5-25 MCG/INH AEPB Inhale 1 puff into the lungs daily 11/1/21  Yes Ludivina Hammond MD   denosumab (PROLIA) 60 MG/ML SOSY SC injection Inject 1 mL into the skin every 6 months 11/1/21  Yes Ludivina Hammond MD   pregabalin (LYRICA) 200 MG capsule Take 1 capsule by mouth 3 times daily for 180 days.  10/9/21 4/7/22 Yes Carlos [address], MD   amLODIPine (NORVASC) 5 MG tablet TAKE ONE TABLET BY MOUTH DAILY 10/6/21  Yes Ludivina Hammond MD   simethicone (MYLICON) 80 MG chewable tablet Take 1 tablet by mouth 4 times daily as needed for Flatulence 8/27/21  Yes Nancy Reed MD   albuterol sulfate HFA (VENTOLIN HFA) 108 (90 Base) MCG/ACT inhaler Inhale 2 puffs into the lungs 4 times daily as needed for Wheezing 6/11/20  Yes Ludivina Hammond MD   vitamin D (ERGOCALCIFEROL) 26701 units CAPS capsule Take 1 capsule by mouth once a week 6/19/19  Yes Ludivina Hammond MD   folic acid (FOLVITE) 1 MG tablet Take 1 tablet by mouth daily 6/19/19  Yes Matthew Eason MD         Physical Exam  General Appearance:    Alert, cooperative, no distress, appears stated age] no distress pink puffer   Head:    Normocephalic, without obvious abnormality, atraumatic   Eye examination normal no Malik syndrome  Nose examination normal no polyps    Throat examination unremarkable    Ear examination normal               :    Neck:   Supple, symmetrical, trachea midline, no adenopathy;     thyroid:  no enlargement/tenderness/nodules; no carotid    bruit or JVD   Back:     Symmetric, no curvature, ROM normal, no CVA tenderness   Lungs:      AP diameter is increased percussion note is hyperresonant expiration prolonged no rales rhonchi are audible or friction rub is audible   Chest Wall:    No tenderness or deformity      Heart:    Regular rate and rhythm, S1 and S2 normal, no murmur, rub        or gallop no rvh                           Abdomen:                                                 Pulses:                                            Lymph nodes:                    Neurologic:                  Soft, non-tender, bowel sounds active all four quadrants,     no masses, no organomegaly         2+ and symmetric all extremities            Cervical, supraclavicular not enlarged or matted or tender      CNII-XII intact, normal strength 5/5 . Sensation grossly normal  and reflexes normal 2+  throughout     Clubbing No  Lower ext edema No1+   [] , 2 +  [] , 3+   []  Upper ext edema No       Musculoskeletal - no joint swelling or tenderness or synovitis               BP (!) 160/104 (Site: Right Upper Arm, Position: Sitting, Cuff Size: Large Adult)   Pulse 84   Temp 98 °F (36.7 °C)   Ht 5' 5\" (1.651 m)   Wt 148 lb 6.4 oz (67.3 kg)   SpO2 95%   BMI 24.70 kg/m²     CXR  Consistent with COPD      CT Scans  No recent CT    Echo  Echo was done in the hospital.        Assessment  COPD  No acute exacerbation  Chronic respiratory failure  Chronic hypercapnic failure  Chronic smoking  Systemic hypertension   on home oxygen    Plan:  She is responding well to bronchodilator therapy including albuterol and Trelegy she will continue the same as before. There is no evidence of any chest infection    She will continue to use her supplemental oxygen as before patient needs a portable oxygen. It is recommended that she should be given oxygen converter so that she can ambulate with supplemental oxygen this is very important for the treatment of her illness Dr. Bria Stoner will get a 6-minute walk test during the next visit.   Plan patient is planning to go to [facility] and would like to have oxygen arranged over before the flight and over there. Her blood pressure is under good control    Once again emphasized strongly to give up smoking altogether. There is no evidence of any heart failure she probably has cor pulmonale but there is no clinical heart failure at this time    We will see her in follow-up in few weeks.

## 2022-03-23 ENCOUNTER — HOSPITAL ENCOUNTER (OUTPATIENT)
Dept: PSYCHIATRY | Age: 52
Setting detail: THERAPIES SERIES
Discharge: HOME | End: 2022-03-23
Payer: MEDICARE

## 2022-03-23 PROCEDURE — 90837 PSYTX W PT 60 MINUTES: CPT | Performed by: SOCIAL WORKER

## 2022-03-24 LAB
% FREE CARBAMAZEPINE: 19.7 % (ref 8–35)
CARBAMAZEPINE, FREE: 1.5 UG/ML (ref 1–3)
CARBAMAZEPINE, TOTAL: 7.6 UG/ML (ref 4–12)
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
## 2022-03-30 ENCOUNTER — TELEPHONE (OUTPATIENT)
Dept: FAMILY MEDICINE CLINIC | Age: 52
End: 2022-03-30

## 2022-03-30 ENCOUNTER — HOSPITAL ENCOUNTER (OUTPATIENT)
Dept: PSYCHIATRY | Age: 52
Setting detail: THERAPIES SERIES
Discharge: HOME | End: 2022-03-30
Payer: MEDICARE

## 2022-03-30 PROCEDURE — 90837 PSYTX W PT 60 MINUTES: CPT | Performed by: SOCIAL WORKER

## 2022-03-30 NOTE — TELEPHONE ENCOUNTER
Pt called stated her BP was at 182/108  Pt states she woke up with a headache, asked pt what other symptoms she is having, no chest pain , but a little shortness of breath \"different from her COPD\" no blurred vision, no dizziness   \"nothing out of the ordinary other than the headache and high BP\" asked if she was having any numbing or tingling, pt stated she has neuropathy so she would not be bale to differentiate between the cause      Asked pt when has she taken her BP meds yet this morning, pt states she took her BP before taking her medication ,     Spoke with Dr. Tomas Gonzales advised pt to gog to the ER, spoke with pt let her know doctor advised for her to go to the ER pt stated she \"is not going to do that\"    Stated she retook her BP it is now down to 136/100     Advised pt to relax, drink water and recheck BP, if it does not go down Please go to the ER     Pt stated she will keep an eye on it

## 2022-03-30 NOTE — TELEPHONE ENCOUNTER
Noted   If possible, please call patient in AM and have her monitor BP over weekend - if elevated may benefit from visit early next week, VV is ok since she can check BP at home
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
## 2022-03-31 NOTE — TELEPHONE ENCOUNTER
LM for pt in regards to montioring BP.  Pt has an in patient appointment with Clark Hassan 04/04/2022

## 2022-03-31 NOTE — TELEPHONE ENCOUNTER
Spoke to patient. Her BP was 150/92. She has appt on Monday with Renetta. Advised patient to keep track of BP and bring log to her appt.      Advised to go to ER if Chest Pains, SOB, blurred vision, and headaches occur

## 2022-04-01 ENCOUNTER — TELEPHONE (OUTPATIENT)
Dept: FAMILY MEDICINE CLINIC | Age: 52
End: 2022-04-01

## 2022-04-01 NOTE — TELEPHONE ENCOUNTER
Pt is trying to get incontinence supplies thru [employer], pt states she just needs an okay from her provider     Gave pt our fax number so they could fax something over

## 2022-04-04 ENCOUNTER — OFFICE VISIT (OUTPATIENT)
Dept: FAMILY MEDICINE CLINIC | Age: 52
End: 2022-04-04
Payer: MEDICARE

## 2022-04-04 VITALS
RESPIRATION RATE: 20 BRPM | HEIGHT: 65 IN | SYSTOLIC BLOOD PRESSURE: 138 MMHG | DIASTOLIC BLOOD PRESSURE: 80 MMHG | BODY MASS INDEX: 24.99 KG/M2 | HEART RATE: 90 BPM | WEIGHT: 150 LBS | OXYGEN SATURATION: 97 %

## 2022-04-04 DIAGNOSIS — Z72.0 TOBACCO USE: ICD-10-CM

## 2022-04-04 DIAGNOSIS — I10 ESSENTIAL HYPERTENSION: Primary | ICD-10-CM

## 2022-04-04 DIAGNOSIS — R55 SYNCOPE, UNSPECIFIED SYNCOPE TYPE: ICD-10-CM

## 2022-04-04 PROCEDURE — 4004F PT TOBACCO SCREEN RCVD TLK: CPT | Performed by: NURSE PRACTITIONER

## 2022-04-04 PROCEDURE — 99213 OFFICE O/P EST LOW 20 MIN: CPT | Performed by: NURSE PRACTITIONER

## 2022-04-04 PROCEDURE — 3017F COLORECTAL CA SCREEN DOC REV: CPT | Performed by: NURSE PRACTITIONER

## 2022-04-04 PROCEDURE — G8427 DOCREV CUR MEDS BY ELIG CLIN: HCPCS | Performed by: NURSE PRACTITIONER

## 2022-04-04 PROCEDURE — G8420 CALC BMI NORM PARAMETERS: HCPCS | Performed by: NURSE PRACTITIONER

## 2022-04-04 RX ORDER — POLYETHYLENE GLYCOL 3350 17 G
4 POWDER IN PACKET (EA) ORAL PRN
Qty: 100 EACH | Refills: 3 | Status: SHIPPED | OUTPATIENT
Start: 2022-04-04 | End: 2022-04-07 | Stop reason: SDUPTHER

## 2022-04-04 RX ORDER — NEOMYCIN SULFATE, POLYMYXIN B SULFATE AND DEXAMETHASONE 3.5; 10000; 1 MG/ML; [USP'U]/ML; MG/ML
SUSPENSION/ DROPS OPHTHALMIC
COMMUNITY
Start: 2022-03-28 | End: 2022-09-01 | Stop reason: ALTCHOICE

## 2022-04-04 RX ORDER — AMLODIPINE BESYLATE 10 MG/1
TABLET ORAL
Qty: 30 TABLET | Refills: 1 | Status: SHIPPED | OUTPATIENT
Start: 2022-04-04 | End: 2022-05-04 | Stop reason: SDUPTHER

## 2022-04-04 ASSESSMENT — COLUMBIA-SUICIDE SEVERITY RATING SCALE - C-SSRS
7. DID THIS OCCUR IN THE LAST THREE MONTHS: NO
3. HAVE YOU BEEN THINKING ABOUT HOW YOU MIGHT KILL YOURSELF?: NO
5. HAVE YOU STARTED TO WORK OUT OR WORKED OUT THE DETAILS OF HOW TO KILL YOURSELF? DO YOU INTEND TO CARRY OUT THIS PLAN?: NO
6. HAVE YOU EVER DONE ANYTHING, STARTED TO DO ANYTHING, OR PREPARED TO DO ANYTHING TO END YOUR LIFE?: NO
4. HAVE YOU HAD THESE THOUGHTS AND HAD SOME INTENTION OF ACTING ON THEM?: NO
1. WITHIN THE PAST MONTH, HAVE YOU WISHED YOU WERE DEAD OR WISHED YOU COULD GO TO SLEEP AND NOT WAKE UP?: NO
2. HAVE YOU ACTUALLY HAD ANY THOUGHTS OF KILLING YOURSELF?: NO

## 2022-04-04 ASSESSMENT — PATIENT HEALTH QUESTIONNAIRE - PHQ9
3. TROUBLE FALLING OR STAYING ASLEEP: 3
8. MOVING OR SPEAKING SO SLOWLY THAT OTHER PEOPLE COULD HAVE NOTICED. OR THE OPPOSITE, BEING SO FIGETY OR RESTLESS THAT YOU HAVE BEEN MOVING AROUND A LOT MORE THAN USUAL: 3
10. IF YOU CHECKED OFF ANY PROBLEMS, HOW DIFFICULT HAVE THESE PROBLEMS MADE IT FOR YOU TO DO YOUR WORK, TAKE CARE OF THINGS AT HOME, OR GET ALONG WITH OTHER PEOPLE: 1
2. FEELING DOWN, DEPRESSED OR HOPELESS: 2
SUM OF ALL RESPONSES TO PHQ QUESTIONS 1-9: 22
6. FEELING BAD ABOUT YOURSELF - OR THAT YOU ARE A FAILURE OR HAVE LET YOURSELF OR YOUR FAMILY DOWN: 3
9. THOUGHTS THAT YOU WOULD BE BETTER OFF DEAD, OR OF HURTING YOURSELF: 0
SUM OF ALL RESPONSES TO PHQ QUESTIONS 1-9: 22
4. FEELING TIRED OR HAVING LITTLE ENERGY: 3
5. POOR APPETITE OR OVEREATING: 3
SUM OF ALL RESPONSES TO PHQ9 QUESTIONS 1 & 2: 4
SUM OF ALL RESPONSES TO PHQ QUESTIONS 1-9: 22
1. LITTLE INTEREST OR PLEASURE IN DOING THINGS: 2
SUM OF ALL RESPONSES TO PHQ QUESTIONS 1-9: 22
7. TROUBLE CONCENTRATING ON THINGS, SUCH AS READING THE NEWSPAPER OR WATCHING TELEVISION: 3

## 2022-04-04 NOTE — PATIENT INSTRUCTIONS
Increase norvasc to 10mg daily - keep checking blood pressures and call office in 1 week with numbers

## 2022-04-04 NOTE — PROGRESS NOTES
Elle Whittaker (:  1970) is a 45 y.o. female,Established patient, here for evaluation of the following chief complaint(s):  Hypertension (3 month follow up) and Loss of Consciousness (states in march. she fell-states she felt like she was going to faint and she fainted hit the table and then the floor)         ASSESSMENT/PLAN:  1. Essential hypertension  Assessment & Plan:   Borderline controlled, changes made today: increase norvasc to 10mg daily and medication adherence emphasized. Maintain a healthy weight and BMI, incorporate regular aerobic exercise and focus on stress management. Adhere to medication regimen, reduce dietary sodium and saturated fat intake - increase fruits, vegetables and whole grains to diet. When self-monitoring blood pressure use reliable instruments; measure blood pressure at least once a week and after any changes made to medication therapy. Bring in an average of home readings on two or more occasions for office visits. Orders:  -     amLODIPine (NORVASC) 10 MG tablet; TAKE ONE TABLET BY MOUTH DAILY, Disp-30 tablet, R-1Normal  2. Syncope, unspecified syncope type  -     AFL - Olivier Mercado, , Cardiology, [facility]  3. Tobacco use  -     nicotine polacrilex (NICOTINE MINI) 4 MG lozenge; Take 1 lozenge by mouth as needed for Smoking cessation, Disp-100 each, R-3Normal        Patient Instructions   Increase norvasc to 10mg daily - keep checking blood pressures and call office in 1 week with numbers        Return in about 1 month (around 2022) for HTN. Subjective   SUBJECTIVE/OBJECTIVE:  Presents for acute visit to discuss HTN    HTN: BP has been elevated   Checking numbers at home - was seeing 150/100s  Shortness of breath seems to be back to baseline for her  No chest pain, swelling of lower legs   Reports she did pass out last month and hit her chin on the table, hit the floor laying sideways. Not sure how long she was out.   This was her first time passing out - and it was pretty scary     COPD: Still smokes 0.5 pack per day  Taking chantix - did get weaned down from a full pack a day to just half a pack  Unable to chew nicotine gum, but would like to try lozenges to cut back smoking even more  Uses portable oxygen concentrator 3-4L when SOB with exertion - carries with her, not using today  Does wear concentrator 3-4L every night     Denies any other problems/concerns at this time       Review of Systems   Constitutional: Negative for activity change, chills, fatigue and unexpected weight change. HENT: Negative for hearing loss, postnasal drip, sinus pressure and sinus pain. Eyes: Negative for pain and visual disturbance. Respiratory: Positive for shortness of breath. Negative for chest tightness. Cardiovascular: Negative for chest pain and palpitations. Gastrointestinal: Negative for abdominal pain, constipation, diarrhea, nausea and vomiting. Endocrine: Negative for polydipsia, polyphagia and polyuria. Genitourinary: Negative for dysuria, flank pain, frequency and urgency. Musculoskeletal: Negative for arthralgias, back pain and myalgias. Skin: Negative for color change and rash. Neurological: Positive for syncope. Negative for dizziness, weakness, light-headedness, numbness and headaches. Psychiatric/Behavioral: Negative for confusion, hallucinations, self-injury, sleep disturbance and suicidal ideas. The patient is not nervous/anxious. Objective   Physical Exam  Vitals and nursing note reviewed. Constitutional:       Appearance: Normal appearance. HENT:      Head: Normocephalic. Right Ear: Tympanic membrane, ear canal and external ear normal.      Left Ear: Tympanic membrane, ear canal and external ear normal.      Nose: Nose normal.      Mouth/Throat:      Mouth: Mucous membranes are moist.      Pharynx: Oropharynx is clear. Eyes:      Extraocular Movements: Extraocular movements intact.       Conjunctiva/sclera: Conjunctivae normal.      Pupils: Pupils are equal, round, and reactive to light. Cardiovascular:      Rate and Rhythm: Normal rate and regular rhythm. Pulses: Normal pulses. Heart sounds: Normal heart sounds, S1 normal and S2 normal.   Pulmonary:      Effort: Pulmonary effort is normal.      Breath sounds: Examination of the left-upper field reveals wheezing. Examination of the left-middle field reveals rhonchi. Wheezing and rhonchi present. Abdominal:      General: Abdomen is flat. Bowel sounds are normal.      Palpations: Abdomen is soft. Musculoskeletal:         General: Normal range of motion. Cervical back: Normal range of motion. Skin:     General: Skin is warm and dry. Capillary Refill: Capillary refill takes less than 2 seconds. Neurological:      General: No focal deficit present. Mental Status: She is alert and oriented to person, place, and time. Mental status is at baseline. Psychiatric:         Mood and Affect: Mood normal.         Behavior: Behavior normal.         Thought Content: Thought content normal.         Judgment: Judgment normal.                Wt Readings from Last 3 Encounters:   04/04/22 150 lb (68 kg)   03/22/22 148 lb 6.4 oz (67.3 kg)   02/02/22 140 lb (63.5 kg)     Temp Readings from Last 3 Encounters:   03/22/22 98 °F (36.7 °C)   02/09/22 97.2 °F (36.2 °C)   01/25/22 97 °F (36.1 °C)     BP Readings from Last 3 Encounters:   04/04/22 138/80   03/22/22 (!) 160/104   02/09/22 (!) 161/90     Pulse Readings from Last 3 Encounters:   04/04/22 90   03/22/22 84   02/09/22 89         An electronic signature was used to authenticate this note.     --LOI Douglas - JW

## 2022-04-06 ASSESSMENT — ENCOUNTER SYMPTOMS
NAUSEA: 0
COLOR CHANGE: 0
EYE PAIN: 0
SHORTNESS OF BREATH: 1
BACK PAIN: 0
VOMITING: 0
SINUS PRESSURE: 0
CHEST TIGHTNESS: 0
SINUS PAIN: 0
ABDOMINAL PAIN: 0
DIARRHEA: 0
CONSTIPATION: 0

## 2022-04-06 NOTE — ASSESSMENT & PLAN NOTE
Borderline controlled, changes made today: increase norvasc to 10mg daily and medication adherence emphasized. Maintain a healthy weight and BMI, incorporate regular aerobic exercise and focus on stress management. Adhere to medication regimen, reduce dietary sodium and saturated fat intake - increase fruits, vegetables and whole grains to diet. When self-monitoring blood pressure use reliable instruments; measure blood pressure at least once a week and after any changes made to medication therapy. Bring in an average of home readings on two or more occasions for office visits.

## 2022-04-07 ENCOUNTER — TELEPHONE (OUTPATIENT)
Dept: FAMILY MEDICINE CLINIC | Age: 52
End: 2022-04-07

## 2022-04-07 DIAGNOSIS — Z72.0 TOBACCO USE: ICD-10-CM

## 2022-04-07 RX ORDER — POLYETHYLENE GLYCOL 3350 17 G
4 POWDER IN PACKET (EA) ORAL PRN
Qty: 100 EACH | Refills: 3 | Status: SHIPPED | OUTPATIENT
Start: 2022-04-07

## 2022-04-08 ENCOUNTER — HOSPITAL ENCOUNTER (OUTPATIENT)
Dept: MAMMOGRAPHY | Age: 52
Discharge: HOME | End: 2022-04-10
Payer: MEDICARE

## 2022-04-08 DIAGNOSIS — Z12.31 ENCOUNTER FOR SCREENING MAMMOGRAM FOR BREAST CANCER: ICD-10-CM

## 2022-04-08 PROCEDURE — 77063 BREAST TOMOSYNTHESIS BI: CPT

## 2022-04-13 ENCOUNTER — HOSPITAL ENCOUNTER (OUTPATIENT)
Dept: PSYCHIATRY | Age: 52
Setting detail: THERAPIES SERIES
Discharge: HOME | End: 2022-04-13
Payer: MEDICARE

## 2022-04-13 PROCEDURE — 90837 PSYTX W PT 60 MINUTES: CPT | Performed by: SOCIAL WORKER
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
## 2022-04-20 ENCOUNTER — HOSPITAL ENCOUNTER (OUTPATIENT)
Dept: PSYCHIATRY | Age: 52
Setting detail: THERAPIES SERIES
Discharge: HOME | End: 2022-04-20
Payer: MEDICARE

## 2022-04-20 DIAGNOSIS — F41.1 GAD (GENERALIZED ANXIETY DISORDER): ICD-10-CM

## 2022-04-20 DIAGNOSIS — F33.1 MODERATE EPISODE OF RECURRENT MAJOR DEPRESSIVE DISORDER (HCC): ICD-10-CM

## 2022-04-20 PROCEDURE — 90834 PSYTX W PT 45 MINUTES: CPT | Performed by: SOCIAL WORKER

## 2022-04-20 RX ORDER — ESCITALOPRAM OXALATE 20 MG/1
20 TABLET ORAL DAILY
Qty: 30 TABLET | Refills: 1 | Status: SHIPPED | OUTPATIENT
Start: 2022-04-20 | End: 2022-07-11 | Stop reason: SDUPTHER

## 2022-04-20 RX ORDER — BUSPIRONE HYDROCHLORIDE 15 MG/1
15 TABLET ORAL 3 TIMES DAILY
Qty: 90 TABLET | Refills: 1 | Status: SHIPPED
Start: 2022-04-20 | End: 2022-08-09 | Stop reason: DRUGHIGH

## 2022-04-20 NOTE — TELEPHONE ENCOUNTER
Patient called stating her psychiatrist is no longer practicing and is asking if tevin and Kat James can be sent in until she finds a new psychiatrist.
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
## 2022-04-23 DIAGNOSIS — G62.9 PERIPHERAL POLYNEUROPATHY: ICD-10-CM

## 2022-04-25 RX ORDER — PREGABALIN 200 MG/1
CAPSULE ORAL
Qty: 90 CAPSULE | Refills: 5 | Status: SHIPPED | OUTPATIENT
Start: 2022-04-25 | End: 2022-11-01 | Stop reason: SDUPTHER

## 2022-04-27 ENCOUNTER — HOSPITAL ENCOUNTER (OUTPATIENT)
Dept: PSYCHIATRY | Age: 52
Setting detail: THERAPIES SERIES
Discharge: HOME | End: 2022-04-27
Payer: MEDICARE

## 2022-04-27 PROCEDURE — 90837 PSYTX W PT 60 MINUTES: CPT | Performed by: SOCIAL WORKER
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
## 2022-05-04 ENCOUNTER — OFFICE VISIT (OUTPATIENT)
Dept: FAMILY MEDICINE CLINIC | Age: 52
End: 2022-05-04
Payer: MEDICARE

## 2022-05-04 VITALS
HEART RATE: 82 BPM | BODY MASS INDEX: 25.16 KG/M2 | DIASTOLIC BLOOD PRESSURE: 84 MMHG | SYSTOLIC BLOOD PRESSURE: 132 MMHG | RESPIRATION RATE: 20 BRPM | WEIGHT: 151 LBS | OXYGEN SATURATION: 93 % | HEIGHT: 65 IN

## 2022-05-04 DIAGNOSIS — I10 ESSENTIAL HYPERTENSION: Primary | ICD-10-CM

## 2022-05-04 DIAGNOSIS — M80.80XA OTHER OSTEOPOROSIS WITH CURRENT PATHOLOGICAL FRACTURE, INITIAL ENCOUNTER: ICD-10-CM

## 2022-05-04 DIAGNOSIS — Z72.0 TOBACCO USE: ICD-10-CM

## 2022-05-04 DIAGNOSIS — G44.211 INTRACTABLE EPISODIC TENSION-TYPE HEADACHE: ICD-10-CM

## 2022-05-04 DIAGNOSIS — G47.9 SLEEP DISTURBANCE: ICD-10-CM

## 2022-05-04 DIAGNOSIS — J44.9 CHRONIC OBSTRUCTIVE PULMONARY DISEASE, UNSPECIFIED COPD TYPE (HCC): ICD-10-CM

## 2022-05-04 PROCEDURE — 99213 OFFICE O/P EST LOW 20 MIN: CPT | Performed by: NURSE PRACTITIONER

## 2022-05-04 PROCEDURE — G8417 CALC BMI ABV UP PARAM F/U: HCPCS | Performed by: NURSE PRACTITIONER

## 2022-05-04 PROCEDURE — 4004F PT TOBACCO SCREEN RCVD TLK: CPT | Performed by: NURSE PRACTITIONER

## 2022-05-04 PROCEDURE — 3017F COLORECTAL CA SCREEN DOC REV: CPT | Performed by: NURSE PRACTITIONER

## 2022-05-04 PROCEDURE — G8427 DOCREV CUR MEDS BY ELIG CLIN: HCPCS | Performed by: NURSE PRACTITIONER

## 2022-05-04 PROCEDURE — 3023F SPIROM DOC REV: CPT | Performed by: NURSE PRACTITIONER

## 2022-05-04 RX ORDER — DENOSUMAB 60 MG/ML
60 INJECTION SUBCUTANEOUS
Qty: 1 ML | Refills: 1 | Status: SHIPPED | OUTPATIENT
Start: 2022-05-04 | End: 2022-08-09

## 2022-05-04 RX ORDER — AMLODIPINE BESYLATE 10 MG/1
TABLET ORAL
Qty: 90 TABLET | Refills: 1 | Status: SHIPPED | OUTPATIENT
Start: 2022-05-04 | End: 2022-10-17

## 2022-05-04 RX ORDER — PRAZOSIN HYDROCHLORIDE 1 MG/1
1 CAPSULE ORAL NIGHTLY
Qty: 30 CAPSULE | Refills: 3 | Status: SHIPPED | OUTPATIENT
Start: 2022-05-04

## 2022-05-04 ASSESSMENT — ENCOUNTER SYMPTOMS
SHORTNESS OF BREATH: 1
NAUSEA: 0
SINUS PRESSURE: 0
DIARRHEA: 0
COUGH: 0
CONSTIPATION: 0
WHEEZING: 1
COLOR CHANGE: 0
BACK PAIN: 0
SINUS PAIN: 0
EYE PAIN: 0
VOMITING: 0
ABDOMINAL PAIN: 0
CHEST TIGHTNESS: 0

## 2022-05-04 NOTE — PROGRESS NOTES
Juan Funk (:  1970) is a 45 y.o. female,Established patient, here for evaluation of the following chief complaint(s):  Hypertension         ASSESSMENT/PLAN:  1. Essential hypertension  Assessment & Plan:   Well-controlled, continue current medications  Orders:  -     amLODIPine (NORVASC) 10 MG tablet; TAKE ONE TABLET BY MOUTH DAILY, Disp-90 tablet, R-1Normal  2. Sleep disturbance  Assessment & Plan:   Well-controlled, continue current medications  Orders:  -     prazosin (MINIPRESS) 1 MG capsule; Take 1 capsule by mouth nightly, Disp-30 capsule, R-3Normal  3. Tobacco use  Comments:  Continue using lozenges and cutting back on cigarette use until you completely quit. 4. Chronic obstructive pulmonary disease, unspecified COPD type ([facility] [address]  Comments:  She will contact pulmonolgy regarding wheezing. Insturcted to use albuterol as needed even when using trelegy. Call this office if unable to reach pulm for assistance if needed. 5. Intractable episodic tension-type headache  Assessment & Plan:   Monitored by specialist- no acute findings meriting change in the plan   She will contact Neurology to discuss migraine symptoms. Return in about 4 months (around 2022).          Subjective   SUBJECTIVE/OBJECTIVE:  Presents for 1 month follow up of HTN    HTN: Has not been checking BP at home  Does feel like it has improved though  Denies chest pain, shortness of breath is baseline for her at this point  No more syncopal episodes since last visit  Has appointment with Dr. Heath Jerez on 22 for syncope     Having more frequent headaches  Reports all of April was a constant migraine  Needs to call Dr. Roly Joseph office to discuss    Started nicotine lozenges, feels like it is going well   Finds that she lights a cigarette, 'hits it 2 or 3 times and just lets it burn'   Has cut back to 0.25 pack per day - she is very happy with this progress   Having a hard time letting go of just the 'habit and oral fixation'     Sleep disturbance: needs refill of minipress  [facility] MONSIVA[facility] (psych) at [address] was giving this to her and he is no longer with the practice   Can not sleep without it     COPD: Started wheezing more recently; has not been using her albuterol. She was not sure if she could use albuterol after taking trelegy   Bringing up green sputum  Does not feel ill or bad overall, just wanted to bring it up during the visit   Feels like she has gunk in her throat and nose - does have allergies and this happens around this time of the year   Wears 4l of O2 nasal cannula at night   She will be calling pulmonology to discuss these symptoms     Denies any other problems/concerns at this time       Review of Systems   Constitutional: Negative for activity change, chills, fatigue and unexpected weight change. HENT: Negative for hearing loss, postnasal drip, sinus pressure and sinus pain. Eyes: Negative for pain and visual disturbance. Respiratory: Positive for shortness of breath and wheezing. Negative for cough and chest tightness. Cardiovascular: Negative for chest pain and palpitations. Gastrointestinal: Negative for abdominal pain, constipation, diarrhea, nausea and vomiting. Endocrine: Negative for polydipsia, polyphagia and polyuria. Genitourinary: Negative for dysuria, flank pain, frequency and urgency. Musculoskeletal: Negative for arthralgias, back pain and myalgias. Skin: Negative for color change and rash. Neurological: Positive for headaches. Negative for dizziness, weakness, light-headedness and numbness. Psychiatric/Behavioral: Positive for sleep disturbance. Negative for confusion, hallucinations, self-injury and suicidal ideas. The patient is not nervous/anxious. Objective   Physical Exam  Vitals and nursing note reviewed. Constitutional:       Appearance: Normal appearance. HENT:      Head: Normocephalic.       Right Ear: Tympanic membrane, ear canal and external ear normal.      Left Ear: Tympanic membrane, ear canal and external ear normal.      Nose: Nose normal.      Mouth/Throat:      Mouth: Mucous membranes are moist.      Pharynx: Oropharynx is clear. Eyes:      Extraocular Movements: Extraocular movements intact. Conjunctiva/sclera: Conjunctivae normal.      Pupils: Pupils are equal, round, and reactive to light. Cardiovascular:      Rate and Rhythm: Normal rate and regular rhythm. Pulses: Normal pulses. Heart sounds: Normal heart sounds. Pulmonary:      Effort: Pulmonary effort is normal.      Breath sounds: Normal air entry. Examination of the right-middle field reveals wheezing. Examination of the left-middle field reveals wheezing. Examination of the right-lower field reveals wheezing. Wheezing present. Abdominal:      General: Abdomen is flat. Bowel sounds are normal.      Palpations: Abdomen is soft. Musculoskeletal:         General: Normal range of motion. Cervical back: Normal range of motion. Skin:     General: Skin is warm and dry. Capillary Refill: Capillary refill takes less than 2 seconds. Neurological:      General: No focal deficit present. Mental Status: She is alert and oriented to person, place, and time. Mental status is at baseline. Psychiatric:         Mood and Affect: Mood normal.         Behavior: Behavior normal.         Thought Content:  Thought content normal.         Judgment: Judgment normal.                Wt Readings from Last 3 Encounters:   05/04/22 151 lb (68.5 kg)   04/04/22 150 lb (68 kg)   03/22/22 148 lb 6.4 oz (67.3 kg)     Temp Readings from Last 3 Encounters:   03/22/22 98 °F (36.7 °C)   02/09/22 97.2 °F (36.2 °C)   01/25/22 97 °F (36.1 °C)     BP Readings from Last 3 Encounters:   05/04/22 132/84   04/04/22 138/80   03/22/22 (!) 160/104     Pulse Readings from Last 3 Encounters:   05/04/22 82   04/04/22 90   03/22/22 84           An electronic signature was used to authenticate this note.    --LOI Lujan - NP

## 2022-05-05 ENCOUNTER — TELEPHONE (OUTPATIENT)
Dept: FAMILY MEDICINE CLINIC | Age: 52
End: 2022-05-05

## 2022-05-05 ENCOUNTER — TELEPHONE (OUTPATIENT)
Dept: PULMONOLOGY | Age: 52
End: 2022-05-05

## 2022-05-05 DIAGNOSIS — K86.0 ALCOHOL-INDUCED CHRONIC PANCREATITIS (HCC): ICD-10-CM

## 2022-05-05 DIAGNOSIS — J43.9 PULMONARY EMPHYSEMA, UNSPECIFIED EMPHYSEMA TYPE (HCC): Primary | ICD-10-CM

## 2022-05-05 DIAGNOSIS — J44.0 CHRONIC OBSTRUCTIVE PULMONARY DISEASE WITH ACUTE LOWER RESPIRATORY INFECTION (HCC): Primary | ICD-10-CM

## 2022-05-05 RX ORDER — PANCRELIPASE 24000; 76000; 120000 [USP'U]/1; [USP'U]/1; [USP'U]/1
CAPSULE, DELAYED RELEASE PELLETS ORAL
Qty: 90 CAPSULE | Refills: 1 | OUTPATIENT
Start: 2022-05-05

## 2022-05-05 NOTE — TELEPHONE ENCOUNTER
Pt called stated she was seen in the office yesterday and forgot to ask for a handicap placard due to her COPD and osteoporosis

## 2022-05-05 NOTE — TELEPHONE ENCOUNTER
Please addend chart notes to state patient is using portable oxygen and will benefit from a portable oxygen concentrator

## 2022-05-11 ENCOUNTER — HOSPITAL ENCOUNTER (OUTPATIENT)
Dept: PSYCHIATRY | Age: 52
Setting detail: THERAPIES SERIES
Discharge: HOME | End: 2022-05-11
Payer: MEDICARE

## 2022-05-11 PROCEDURE — 90837 PSYTX W PT 60 MINUTES: CPT | Performed by: SOCIAL WORKER
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
## 2022-05-13 DIAGNOSIS — K86.0 ALCOHOL-INDUCED CHRONIC PANCREATITIS (HCC): ICD-10-CM

## 2022-05-13 NOTE — TELEPHONE ENCOUNTER
Patient called stating her psychiatrist left and will not see a new one for 1-2 weeks.   She is asking for a refill on Hydroxyzine and Mirtazapine

## 2022-05-13 NOTE — TELEPHONE ENCOUNTER
Last visit: 5/4/22  Last Med refill: 2/14/22  Does patient have enough medication for 72 hours: No:     Next Visit Date:  Future Appointments   Date Time Provider Lexi Castillo   5/18/2022  2:00 PM Jonothan Jewels, LISW [facility]   5/24/2022  2:00 PM LOI Rosenberg - CNP Neuro Spec [facility]   5/25/2022  2:00 PM Jonothan Jewels, LISW [facility]   6/1/2022  2:00 PM Jonothan Jewels, LISW [facility]   6/8/2022  2:00 PM Jonothan Jewels, LISW [facility]   6/15/2022  2:00 PM Jonothan Jewels, LISW [facility]   6/22/2022  2:00 PM Jonothan Jewels, LISW [facility]   6/29/2022  2:00 PM Jonothan Jewels, [address] [place] [address] [facility]   7/6/2022  2:00 PM Jonothan Jewels, LISW [facility]   7/13/2022  2:00 PM Jonothan Jewels, LISW [facility]   7/22/2022 11:00 AM Berkley Buckley MD [facility]   8/11/2022 10:30 AM Jose Manuel Jeong MD Resp Spec [facility]   9/1/2022 11:15 AM Ludivina Ellis  [address] [facility]   Topic Date Due    Pneumococcal 0-64 years Vaccine (2 - PCV) 11/04/2022    Depression Monitoring  04/04/2023    Breast cancer screen  04/08/2024    Lipids  12/23/2025    DTaP/Tdap/Td vaccine (2 - Td or Tdap) 12/28/2030    Colorectal Cancer Screen  11/19/2031    Flu vaccine  Completed    Shingles vaccine  Completed    COVID-19 Vaccine  Completed    Hepatitis C screen  Completed    HIV screen  Completed    Hepatitis A vaccine  Aged Out    Hepatitis B vaccine  Aged Out    Hib vaccine  Aged Out    Meningococcal (ACWY) vaccine  Aged Out       Hemoglobin A1C (%)   Date Value   04/13/2021 5.5   07/14/2019 4.3             ( goal A1C is < 7)   No results found for: LABMICR  LDL Cholesterol (mg/dL)   Date Value   12/23/2020 134 (H)   06/18/2019 92       (goal LDL is <100)   AST (U/L)   Date Value   10/12/2021 14     ALT (U/L)   Date Value   10/12/2021 7     BUN (mg/dL)   Date Value 01/10/2022 7     BP Readings from Last 3 Encounters:   05/04/22 132/84   04/04/22 138/80   03/22/22 (!) 160/104          (goal 120/80)    All Future Testing planned in [facility]  Lab Frequency Next Occurrence   COVID-19 Once 05/19/2021   CT ABDOMEN PELVIS W WO CONTRAST Additional Contrast? Radiologist Recommendation Once 09/10/2021   DEXA VERTEBRAL FRACTURE ASSESSMENT Once 08/31/2021   FACET JOINT L/S SINGLE Once 09/16/2021   OT functional capacity evaluation (FCE) Once 12/14/2021   EGD Once 02/04/2022   ERCP Once 02/04/2022               Patient Active Problem List:     Acute bronchitis     Reflex sympathetic dystrophy of lower limb     Essential hypertension     Nicotine addiction     Psychophysiological insomnia     Anxiety     Alcoholic peripheral neuropathy (HCC)     Hypokalemia     Macrocytic anemia     Hypomagnesemia     Tobacco use     Ambulatory dysfunction     Seizure disorder ([address]     COPD with acute exacerbation (HCC)     Paresthesia of lower extremity     Acute respiratory failure with hypoxia (HCC)     Pancreatic cyst     Recurrent major depressive disorder (HCC)     Elevated CA 19-9 level     Perineal mass, female     Esophagitis candidal      Condyloma     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24, the patient underwent 2 surgical resections without known recurrence     Alcohol use disorder, severe, in early remission ([address]     Metabolic encephalopathy     AMAN (generalized anxiety disorder)     Major depressive disorder, recurrent severe without psychotic features ([address]     Esophageal dysphagia     Chronic peripheral neuropathic pain     History of alcohol abuse     History of petit-mal seizures     Intractable episodic tension-type headache     Personal history of astrocytoma     Multilevel spine pain     Chronic pain syndrome     Marijuana abuse     Severe sepsis (HCC)     Closed fracture of fifth thoracic vertebra (HCC)     Diverticulitis of large intestine with abscess without bleeding Elevated brain natriuretic peptide (BNP) level     Elevated alkaline phosphatase level     Chronic pancreatitis (HCC)     Erythema ab igne     Compression fracture of thoracic vertebra (HCC)     Pathological compression fracture of lumbar vertebra with delayed healing     Metabolic acidosis with increased anion gap and accumulation of organic acids     Community acquired pneumonia of left lower lobe of lung     Septicemia ([address]     Alcohol-induced acute pancreatitis     Fluid collection of pancreas     Diverticulitis of large intestine without perforation or abscess with bleeding     Pseudocyst of pancreas     Bandemia     Sepsis ([address]     Acute recurrent pancreatitis     Atelectasis of left lung     Hyponatremia     Iron deficiency anemia     Adenomatous polyp of sigmoid colon     Moderate episode of recurrent major depressive disorder ([address]     Sleep disturbance

## 2022-05-14 RX ORDER — SUCRALFATE 1 G/1
TABLET ORAL
Qty: 120 TABLET | Refills: 3 | Status: SHIPPED | OUTPATIENT
Start: 2022-05-14 | End: 2022-08-25

## 2022-05-16 RX ORDER — PANCRELIPASE 24000; 76000; 120000 [USP'U]/1; [USP'U]/1; [USP'U]/1
CAPSULE, DELAYED RELEASE PELLETS ORAL
Qty: 90 CAPSULE | Refills: 1 | Status: SHIPPED | OUTPATIENT
Start: 2022-05-16 | End: 2022-07-11

## 2022-05-16 RX ORDER — MIRTAZAPINE 7.5 MG/1
7.5 TABLET, FILM COATED ORAL NIGHTLY PRN
Qty: 30 TABLET | Refills: 0 | Status: SHIPPED
Start: 2022-05-16 | End: 2022-09-01 | Stop reason: DRUGHIGH

## 2022-05-16 RX ORDER — HYDROXYZINE HYDROCHLORIDE 25 MG/1
25 TABLET, FILM COATED ORAL 3 TIMES DAILY PRN
Qty: 90 TABLET | Refills: 0 | Status: SHIPPED | OUTPATIENT
Start: 2022-05-16 | End: 2022-09-19

## 2022-05-18 ENCOUNTER — HOSPITAL ENCOUNTER (OUTPATIENT)
Dept: PSYCHIATRY | Age: 52
Setting detail: THERAPIES SERIES
Discharge: HOME | End: 2022-05-18
Payer: MEDICARE

## 2022-05-18 PROCEDURE — 90837 PSYTX W PT 60 MINUTES: CPT | Performed by: SOCIAL WORKER
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
## 2022-05-24 ENCOUNTER — OFFICE VISIT (OUTPATIENT)
Dept: NEUROLOGY | Age: 52
End: 2022-05-24
Payer: MEDICARE

## 2022-05-24 VITALS
SYSTOLIC BLOOD PRESSURE: 110 MMHG | DIASTOLIC BLOOD PRESSURE: 74 MMHG | HEIGHT: 65 IN | HEART RATE: 84 BPM | BODY MASS INDEX: 24.99 KG/M2 | WEIGHT: 150 LBS

## 2022-05-24 DIAGNOSIS — C71.9 ASTROCYTOMA (HCC): ICD-10-CM

## 2022-05-24 DIAGNOSIS — G40.909 SEIZURE DISORDER (HCC): ICD-10-CM

## 2022-05-24 DIAGNOSIS — G62.1 ALCOHOLIC PERIPHERAL NEUROPATHY (HCC): Primary | ICD-10-CM

## 2022-05-24 DIAGNOSIS — G43.019 INTRACTABLE MIGRAINE WITHOUT AURA AND WITHOUT STATUS MIGRAINOSUS: ICD-10-CM

## 2022-05-24 PROCEDURE — 4004F PT TOBACCO SCREEN RCVD TLK: CPT | Performed by: NURSE PRACTITIONER

## 2022-05-24 PROCEDURE — G8420 CALC BMI NORM PARAMETERS: HCPCS | Performed by: NURSE PRACTITIONER

## 2022-05-24 PROCEDURE — 99214 OFFICE O/P EST MOD 30 MIN: CPT | Performed by: NURSE PRACTITIONER

## 2022-05-24 PROCEDURE — G8427 DOCREV CUR MEDS BY ELIG CLIN: HCPCS | Performed by: NURSE PRACTITIONER

## 2022-05-24 PROCEDURE — 3017F COLORECTAL CA SCREEN DOC REV: CPT | Performed by: NURSE PRACTITIONER

## 2022-05-24 RX ORDER — FREMANEZUMAB-VFRM 225 MG/1.5ML
225 INJECTION SUBCUTANEOUS
Qty: 1 PEN | Refills: 5 | Status: SHIPPED | OUTPATIENT
Start: 2022-05-24 | End: 2022-08-29 | Stop reason: ALTCHOICE

## 2022-05-24 RX ORDER — RIZATRIPTAN BENZOATE 10 MG/1
10 TABLET ORAL
Qty: 12 TABLET | Refills: 5 | Status: SHIPPED | OUTPATIENT
Start: 2022-05-24 | End: 2022-08-09 | Stop reason: ALTCHOICE

## 2022-05-24 NOTE — PROGRESS NOTES
[facility]            Ne[address]          [facility], [address]          Dept: [phone]          Dept Fax: [phone]    MD Bard Kim Meek MD Ahmed B. Terril Norton, MD Marlon Ronde, MD Cyndy Cools, CNP            5/24/2022      HISTORY OF PRESENT ILLNESS:       I had the pleasure of seeing Elijah Dotson, who returns for continuing neurologic care. The patient was seen last on March 17, 2022 for treatment of a seizure disorder, migraine headaches, a peripheral neuropathy. The patient was previously seen by Dr. Macy Martines and all of the records and diagnostic studies were carefully reviewed. For headache prophylaxis she was prescribed verapamil 60 mg daily and topamax 50 mg twice daily. She reports today that she has continued to have headaches and reports that she she typically has headaches for 10-15 headaches/month. These headaches are holoacranial in nature and are present upon awaking. They are described as a pressure sensation associated with photophobia, phonophobia, nausea and occasional vomiting. Her headaches are currently an 8/10 in intensity. She has been having headaches for approximately the past two years. She notes that she has a history of alcohol abuse which causes memory difficulties.  When she gets a headache she was prescribed      Headache location: holoacranial   Headache quality: pressure  Associated factors:  Nausea, vomiting, photophobia, phonophobia  Intensity: 8/10  Headache chronicity: 2 years  Headache frequency: 10-15 headaches/month   Aggravating factors : bright lights, loud sounds  Relieving factors: none currently   Prophylactic medications: topamax, verapamil, Lyrica,, carbamazepine  Abortive medications: none   Previously used medications: gabapentin, lexapro, magnesium, prozac    For seizure prophylaxis she was prescribed topamax 50 mg twice daily, lyrica 200 mg three times daily and tegretol 200 mg three times daily. The patient previously followed with Dr. Leonela Nair who reported that most of her seizures were related to alcohol. She was placed on topamax in order to begin to wean tegretol. The patient's was hospitalized in August 1292 with metabolic encephalopathy and tegretol was increased back to 200 mg three times daily. Her last seizure occurred in March 2020. She also has a history of a chronic L4-5 radiculopathy with a previous MRI of her lumbar spine demonstrating a compression deformity of the L5 superior endplate. For management she was prescribed lyrica 200 mg three times daily. For management of her peripheral neuropathy she was prescribed lyrica 200 mg three times daily. The patient also has a history of an astrocytoma status post 2 resections in 1989. Testing reviewed:    MRI Brain W WO Contrast 12/31/2021  Impression   No acute intracranial abnormality.  No abnormal postcontrast enhancement. MRI Lumbar Spine WO Contrast 7/23/2021  Impression   1. Acute compression deformity of the superior endplate of L5 with   approximately 35% loss of height.  No significant bulging of the posterior   cortex. 2. No significant spinal canal stenosis of the lumbar spine. 3. Neural foraminal narrowing at L3-L4 through L5-S1.   4. Minimal retrolisthesis at L5-S1. EEG 7/29/21:  abnormal EEG secondary to generalized slowing without focal or  paroxysmal abnormality.     Carbamazepine (7/25/2021): Total level 7.7 (4-12). ,  Free level: 2.3         PAST MEDICAL HISTORY:         Diagnosis Date    Acute respiratory failure with hypoxia ([address] 10/16/2020    Alcohol withdrawal syndrome, with delirium ([address] 12/14/2019    Alcoholism ([address]     Anal warts     Anemia 10/2020    GI bleed    Anxiety     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24, the patient underwent 2 surgical resections without known recurrence 10/23/2020    at age 24    Closed fracture of lateral portion of left tibial plateau 37/47/7604    Condyloma     COPD (chronic obstructive pulmonary disease) (HCC)     CO2 retainer, on Bipap at night for this, Dr. Alyce Stringer ( last visit 11/20/2020 and note on chart )    Depression      major depressive disorder, ptsd, anxiety    Dysphagia     GI bleed 10/2020    Hypertension     Memory loss     Oxygen dependent     USES  3L/MIN DAILY PRN AND NIGHTLY CONTINUOUSLY    Pain, joint, ankle and foot     Pancreatic lesion 10/2020    Dr. Butler Shoulders working up pt    Perianal wart     Peripheral neuropathy     Seizures ([address]     LAST SEIZURE 3/2020 - FEELS IT WAS FROM ALCOHOL WITHDRAWL    Tension headache     Under care of team 09/29/2021    [facility]    Under care of team 09/29/2021    pain management-Hamzah Martines-nery jimenez-last visit sep 2021    Under care of team     pulmonology-Dr [facility]as[facility]-due for visit March 2022    Under care of team 09/29/2021    psych-bahnfeldt NP-telemed-last visit 10/ 2021    Under care of team 09/29/2021    bp-[address]-last visit aug 2021    Under care of team     NEPHROLOGY - DR. LEE    Wears glasses     Wears partial dentures     UPPER    Wellness examination     pcp-Ludivina grimes-last visit Jan 5, 2022        PAST SURGICAL HISTORY:         Procedure Laterality Date    ANUS SURGERY N/A 01/25/2022    EXCISION AND FULGURATION, ANAL, VAGINAL AND PERIANAL WARTS WITH CO2 LASER, FORTEC performed by Tutu Arndt MD at [facility] times 2    COLONOSCOPY N/A 10/22/2020    COLONOSCOPY DIAGNOSTIC performed by Ila Lopez MD at  [facility] [address] COLONOSCOPY N/A 11/19/2021    COLONOSCOPY W/ ENDOSCOPIC MUCOSAL RESECTION performed by Ila Lopez MD at [facility]    [facility]  07/28/2021    CT ABSCESS DRAIN SUBCUTANEOUS 7/28/2021 [facility] CT SCAN    ENDOSCOPIC ULTRASOUND (LOWER) N/A 12/09/2020    ENDOSCOPIC ULTRASOUND, UPPER WITH LINEAR SCOPE FOR BIOPSY OF MASS ON HEAD OF PANCREAS performed by Christy Dunbar MD at [facility]  02/09/2022    ENDOSCOPIC ULTRASOUND, EGD - GI SCHEDULED,EGD STENT REMOVAL    ERCP N/A 08/11/2021    ERCP STENT INSERTION performed by Chantal Richardson MD at [facility]    ERCP  08/11/2021    ERCP SPHINCTER/PAPILLOTOMY performed by Chantal Richardson MD at [facility]    ERCP N/A 10/21/2021    ERCP STENT REMOVAL/EXCHANGE performed by Chantal Richardson MD at [address] ERCP  10/21/2021    ERCP STONE REMOVAL performed by Chantal Richardson MD at [address] ERCP  10/21/2021    ERCP ENDOSCOPIC RETROGRADE CHOLANGIOPANCREATOGRAPHY DIRECT VISUALIZATION performed by Chantal Richardson MD at [address] [facility] Left 07/03/2013    ORIF tibial plateau    FRACTURE SURGERY Right     small finger metacarpal fracture    HAND SURGERY      HYSTERECTOMY  2003    SPINE SURGERY N/A 09/10/2021    KYPHOPLASTY lumbar L5 performed by Jason Demarco MD at [facility] 10/22/2020    EGD BIOPSY performed by Ilene Dumont MD at [address] [place] [address] N/A 04/05/2021    EGD BIOPSY performed by Ilene Dumont MD at [address] [place] [address] N/A 09/08/2021    ENDOSCOPIC ULTRASOUND, LINEAR SCOPE performed by Christy Dunbar MD at [address] [place] [address] N/A 2/9/2022    ENDOSCOPIC ULTRASOUND, EGD - GI SCHEDULED performed by Chantal Richardson MD at [address] [place] [address]  2/9/2022    EGD STENT REMOVAL performed by Chantal Richardson MD at [address] [place], [place] [address]bet:     Social History     Socioeconomic History    Marital status: Single     Spouse name: Not on file    Number of children: Not on file    Years of education: Not on file    Highest education level: Not on file   Occupational History    Not on file   Tobacco Use    Smoking status: Current Every Day Smoker     Packs/day: 0.50     Years: 30.00     Pack years: 15.00     Types: Cigarettes    Smokeless tobacco: Never Used    Tobacco comment: was smoking 1 ppd   Vaping Use    Vaping Use: Never used   Substance and Sexual Activity    Alcohol use: Not Currently     Alcohol/week: 0.0 standard drinks    Drug use: Yes     Frequency: 2.0 times per week     Comment: recreation    Sexual activity: Not Currently   Other Topics Concern    Not on file   Social History Narrative    Not on file     Social Determinants of Health     Financial Resource Strain: Medium Risk    Difficulty of Paying Living Expenses: Somewhat hard   Food Insecurity: No Food Insecurity    Worried About Running Out of Food in the Last Year: Never true    [place] of Food in the Last Year: Never true   Transportation Needs:     Lack of Transportation (Medical): Not on file    Lack of Transportation (Non-Medical):  Not on file   Physical Activity:     Days of Exercise per Week: Not on file    Minutes of Exercise per Session: Not on file   Stress:     Feeling of Stress : Not on file   Social Connections:     Frequency of Communication with Friends and Family: Not on file    Frequency of Social Gatherings with Friends and Family: Not on file    Attends Alevism Services: Not on file    Active Member of [address] [place] [address] [employer] or Organizations: Not on file    Attends Club or Organization Meetings: Not on file    Marital Status: Not on file   Intimate Partner Violence:     Fear of Current or Ex-Partner: Not on file    Emotionally Abused: Not on file    Physically Abused: Not on file    Sexually Abused: Not on file   Housing Stability:     Unable to Pay for Housing in the Last Year: Not on file    Number of [place] in the Last Year: Not on file    Unstable Housing in the Last Year: Not on file       CURRENT MEDICATIONS:     Current Outpatient Medications   Medication Sig Dispense Refill    Fremanezumab-vfrm (AJOVY) 225 MG/1.5ML SOAJ Inject 225 mg into the skin every 30 days 1 pen 5    rizatriptan (MAXALT) 10 MG tablet Take 1 tablet by mouth once as needed for Migraine May repeat in 2 hours if needed 12 tablet 5    CREON 38719-02111 units delayed release capsule TAKE ONE CAPSULE BY MOUTH THREE TIMES A DAY WITH MEALS 90 capsule 1    hydrOXYzine (ATARAX) 25 MG tablet Take 1 tablet by mouth 3 times daily as needed for Itching 90 tablet 0    mirtazapine (REMERON) 7.5 MG tablet Take 1 tablet by mouth nightly as needed (sleep) 30 tablet 0    sucralfate (CARAFATE) 1 GM tablet TAKE ONE TABLET BY MOUTH FOUR TIMES A  tablet 3    Handicap Placard MISC by Does not apply route Expires 5/2027 1 each 0    prazosin (MINIPRESS) 1 MG capsule Take 1 capsule by mouth nightly 30 capsule 3    denosumab (PROLIA) 60 MG/ML SOSY SC injection Inject 1 mL into the skin every 6 months 1 mL 1    amLODIPine (NORVASC) 10 MG tablet TAKE ONE TABLET BY MOUTH DAILY 90 tablet 1    pregabalin (LYRICA) 200 MG capsule TAKE ONE CAPSULE BY MOUTH THREE TIMES A DAY 90 capsule 5    busPIRone (BUSPAR) 15 MG tablet Take 15 mg by mouth three times daily 90 tablet 1    escitalopram (LEXAPRO) 20 MG tablet Take 1 tablet by mouth daily 30 tablet 1    nicotine polacrilex (NICOTINE MINI) 4 MG lozenge Take 1 lozenge by mouth as needed for Smoking cessation Weeks 1 to 6: 1 lozenge every 1 to 2 hours. Weeks 7 to 9: 1 lozenge every 2 to 4 hours. Weeks 10 to 12: 1 lozenge every 4 to 8 hours. Maximum 20 lozenges per day.  100 each 3    sodium chloride 1 g tablet Take 1 tablet by mouth 4 times daily Note increase in dose per Dr Lomax Goes 360 tablet 3    neomycin-polymyxin-dexameth (MAXITROL) 3.5-34777-8.1 ophthalmic suspension       verapamil (CALAN SR) 120 MG extended release tablet Take 1 tablet by mouth daily 90 tablet 1    omeprazole (PRILOSEC) 40 MG delayed release capsule TAKE ONE CAPSULE BY MOUTH TWICE A DAY 60 capsule 2    chlorproMAZINE (THORAZINE) 10 MG tablet Take 1 tablet by mouth 3 times daily as needed (anxiety) (Patient taking differently: Take 10 mg by mouth 2 times daily ) 90 tablet 1    senna (SENOKOT) 8.6 MG tablet Take 1 tablet by mouth 2 times daily 60 tablet 11    lidocaine (XYLOCAINE) 5 % ointment Apply topically as needed. 30 g 1    carBAMazepine (TEGRETOL) 200 MG tablet TAKE ONE TABLET BY MOUTH THREE TIMES A DAY 90 tablet 5    metoclopramide (REGLAN) 5 MG tablet TAKE ONE TABLET BY MOUTH THREE TIMES A DAY 90 tablet 3    rOPINIRole (REQUIP) 1 MG tablet TAKE ONE TABLET BY MOUTH ONCE NIGHTLY 90 tablet 1    topiramate (TOPAMAX) 50 MG tablet Take 1.5 tab bid for 2 wk, then two tab bid (Patient taking differently: Take 50 mg by mouth 2 times daily ) 120 tablet 3    fluticasone-umeclidin-vilant (TRELEGY ELLIPTA) 100-62.5-25 MCG/INH AEPB Inhale 1 puff into the lungs daily 1 each 5    simethicone (MYLICON) 80 MG chewable tablet Take 1 tablet by mouth 4 times daily as needed for Flatulence 180 tablet 3    albuterol sulfate HFA (VENTOLIN HFA) 108 (90 Base) MCG/ACT inhaler Inhale 2 puffs into the lungs 4 times daily as needed for Wheezing 1 Inhaler 5    vitamin D (ERGOCALCIFEROL) 48642 units CAPS capsule Take 1 capsule by mouth once a week 12 capsule 1    folic acid (FOLVITE) 1 MG tablet Take 1 tablet by mouth daily 90 tablet 1     No current facility-administered medications for this visit. ALLERGIES:   No Known Allergies                              REVIEW OF SYSTEMS        All items selected indicate a positive finding. Those items not selected are negative.   Constitutional [] Weight loss/gain   [] Fatigue  [] Fever/Chills   HEENT [] Hearing Loss  [x] Visual Disturbance  [] Tinnitus  [] Eye pain   Respiratory [] Shortness of Breath  [] Cough  [] Snoring   Cardiovascular [] Chest Pain  [] Palpitations  [] Lightheaded   GI [] Constipation  [] Diarrhea  [] Swallowing change  [x] Nausea/vomiting    [] Urinary Frequency  [] Urinary Urgency   Musculoskeletal [] Neck pain  [x] Back pain  [] Muscle pain  [] Restless legs   Dermatologic [] Skin changes   Neurologic [x] Memory loss/confusion  [] Seizures  [x] Trouble walking or imbalance  [] Dizziness  [] Sleep disturbance  [] Weakness  [x] Numbness  [] Tremors  [] Speech Difficulty  [x] Headaches  [x] Light Sensitivity  [x] Sound Sensitivity   Endocrinology []Excessive thirst  []Excessive hunger   Psychiatric [] Anxiety/Depression  [] Hallucination   Allergy/immunology []Hives/environmental allergies   Hematologic/lymph [] Abnormal bleeding  [] Abnormal bruising         PHYSICAL EXAMINATION:       Vitals:    05/24/22 1355   BP: 110/74   Pulse: 84                                              .                                                                                                    General Appearance:  Alert, cooperative, no signs of distress, appears stated age   Head:  Normocephalic, no signs of trauma   Eyes:  Conjunctiva/corneas clear;  eyelids intact   Ears:  Normal external ear and canals   Nose: Nares normal, mucosa normal, no drainage    Throat: Lips and tongue normal; teeth normal;  gums normal   Neck: Supple, intact flexion, extension and rotation;   trachea midline;  no adenopathy;   thyroid: not enlarged;   no carotid pulse abnormality   Back:   Symmetric, no curvature, ROM adequate   Lungs:   Respirations unlabored   Heart:  Regular rate and rhythm           Extremities: Extremities normal, no cyanosis, no edema   Pulses: Symmetric over head and neck   Skin: Skin color, texture normal, no rashes, no lesions                                     NEUROLOGIC EXAMINATION    Neurologic Exam  Mental status    Alert and oriented x 3; intact memory with no confusion, speech or language problems; no hallucinations or delusions  Fund of information appropriate for level of education    Cranial nerves    II - visual fields intact to confrontation bilaterally  III, IV, VI - extra-ocular muscles full: no pupillary defect; no CAMMIE, no nystagmus, no ptosis   V - normal facial sensation                                                               VII - normal facial symmetry                                                             VIII - intact hearing                                                                             IX, X - symmetrical palate                                                                  XI - symmetrical shoulder shrug                                                       XII - tongue midline without atrophy or fasciculation      Motor function  Normal muscle bulk and tone; strength 5/5 on all 4 extremities, no pronator drift      Sensory function Intact to light touch, pinprick, vibration, proprioception on all 4 extremities      Cerebellar Intact fine motor movement. No involuntary movements or tremors. No ataxia or dysmetria on finger to nose or heel to shin testing      Reflex function DTR 2+ on bilateral UE and LE, symmetric. Down going toes bilaterally      Gait                   normal base and arm swing                  Medical Decision Making: In summary, your patient, Andre Delgado exhibits the following, with associated plan:    1. Chronic migraine headaches currently getting 10-15 migraine headaches/month. Previous medications include gabapentin, prozac, lexapro, magnesium and prozac, Lyrica, carbamazepine  1. Continue topamax 50 mg twice daily  2. Continue verapamil 60 mg daily  3. Start ajovy 225 mg injections every 30 days, since the patient has tried and failed several prophylactic medications  4. Start maxalt for abortive therapy  2. Seizure disorder with her last seizure occurring in March 2020. The patient was hospitalized after attempting to wean tegretol in August 2021 and was found to have metabolic encephalopathy. 1. Continue tegretol 200 mg three times daily  2. Continue lyrica 200 mg three times daily  3.  Continue topamax 50 mg twice daily  3. Peripheral neuropathy likely secondary to her history of alcohol abuse  1. Continue lyrica 200 mg three times daily  4. Cognitive impairment secondary to her history of alcohol abuse  1. The patient was informed that she is cleared to drive as she has remained seizure free for six months but was advised in using caution as she has a cognitive impairment and visual difficulties. 5. L4-5 radiculopathy  1. Continue lyrica 200 mg three times daily  6. History of an astrocytoma status post two resections in 1989  1. Return for follow up visit in 3 months            Signed: Khloe Klein CNP      *Please note that portions of this note were completed with a voice recognition program.  Although every effort was made to insure the accuracy of this automated transcription, some errors in transcription may have occurred, occasionally words and are mis-transcribed    Provider Attestation: The documentation recorded by the scribe accurately reflects the service I personally performed and the decisions made by myself. Portions of this note were transcribed by a scribe. I personally performed the history, physical exam, and medical decision-making and confirm the accuracy of the information in the transcribed note. Scribe Attestation:   By signing my name below, Cristy Montana, attest that this documentation has been prepared under the direction and in the presence of Khloe Klein CNP.

## 2022-05-25 ENCOUNTER — HOSPITAL ENCOUNTER (OUTPATIENT)
Dept: PSYCHIATRY | Age: 52
Setting detail: THERAPIES SERIES
Discharge: HOME | End: 2022-05-25
Payer: MEDICARE

## 2022-05-25 PROCEDURE — 90837 PSYTX W PT 60 MINUTES: CPT | Performed by: SOCIAL WORKER
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
## 2022-05-27 ENCOUNTER — TELEPHONE (OUTPATIENT)
Dept: NEUROLOGY | Age: 52
End: 2022-05-27

## 2022-06-08 ENCOUNTER — HOSPITAL ENCOUNTER (OUTPATIENT)
Dept: PSYCHIATRY | Age: 52
Setting detail: THERAPIES SERIES
Discharge: HOME | End: 2022-06-08

## 2022-06-08 ENCOUNTER — TELEPHONE (OUTPATIENT)
Dept: GASTROENTEROLOGY | Age: 52
End: 2022-06-08

## 2022-06-08 ENCOUNTER — TELEPHONE (OUTPATIENT)
Dept: FAMILY MEDICINE CLINIC | Age: 52
End: 2022-06-08

## 2022-06-08 RX ORDER — SIMETHICONE 80 MG
80 TABLET,CHEWABLE ORAL 4 TIMES DAILY PRN
Qty: 180 TABLET | Refills: 3 | Status: SHIPPED
Start: 2022-06-08 | End: 2022-08-09 | Stop reason: SDUPTHER

## 2022-06-08 NOTE — TELEPHONE ENCOUNTER
Spoke with Niya at [employer] and explained message below and she stated okay she will let the patient know.

## 2022-06-08 NOTE — TELEPHONE ENCOUNTER
She does not have those diagnoses so I cannot add them. She will need to talk to urology in that case.

## 2022-06-08 NOTE — TELEPHONE ENCOUNTER
Patient called and states she is having a lot of bloating and has had it for the past week. Patient would like to know if there is any medication she can take to give her some relief.

## 2022-06-08 NOTE — TELEPHONE ENCOUNTER
[employer] called in regards to incontinence supplies order that was faxed. DX on the form is urinary and stress incontinence. Per the lady I spoke with she states DM and order chronic kidney disease needs to be added for patient to qualify. I do not see anything in patients problem list. Pt does see urology, is this something you can update?

## 2022-06-09 NOTE — TELEPHONE ENCOUNTER
Spoke with patient and gave info verbally, also sent [facility] message. So patient can communicate back easily .

## 2022-06-14 RX ORDER — VARENICLINE TARTRATE 1 MG/1
TABLET, FILM COATED ORAL
Qty: 60 TABLET | Refills: 3 | OUTPATIENT
Start: 2022-06-14

## 2022-06-15 ENCOUNTER — HOSPITAL ENCOUNTER (OUTPATIENT)
Dept: PSYCHIATRY | Age: 52
Setting detail: THERAPIES SERIES
Discharge: HOME | End: 2022-06-15
Payer: MEDICARE

## 2022-06-15 PROCEDURE — 90837 PSYTX W PT 60 MINUTES: CPT | Performed by: SOCIAL WORKER
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
## 2022-06-20 NOTE — TELEPHONE ENCOUNTER
LAST VISIT:   5/4/2022     Future Appointments   Date Time Provider Lexi Castillo   6/22/2022  2:00 PM Oneal Sanford, LISW [facility]   6/29/2022  2:00 PM Oneal Sanford, LISW [facility]   7/6/2022  2:00 PM Oneal Sanford, LISW [facility]   7/13/2022  2:00 PM Oneal Sanford, LISW [facility]   7/22/2022 11:00 AM Edwin Leon MD [facility]   8/11/2022 10:30 AM Anupama Edgar MD Resp Spec [facility]   8/29/2022  3:40 PM LOI Parker - CNP Neuro Spec [facility]   9/1/2022 11:15 AM Ludivina Carpio MD [facility]

## 2022-06-21 ENCOUNTER — TELEPHONE (OUTPATIENT)
Dept: NEUROLOGY | Age: 52
End: 2022-06-21

## 2022-06-21 RX ORDER — VARENICLINE TARTRATE 1 MG/1
1 TABLET, FILM COATED ORAL 2 TIMES DAILY
Qty: 60 TABLET | Refills: 3 | Status: SHIPPED | OUTPATIENT
Start: 2022-06-21 | End: 2022-08-09
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
## 2022-06-22 ENCOUNTER — HOSPITAL ENCOUNTER (OUTPATIENT)
Dept: PSYCHIATRY | Age: 52
Setting detail: THERAPIES SERIES
Discharge: HOME | End: 2022-06-22
Payer: MEDICARE

## 2022-06-22 PROCEDURE — 90837 PSYTX W PT 60 MINUTES: CPT | Performed by: SOCIAL WORKER
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
## 2022-06-29 ENCOUNTER — HOSPITAL ENCOUNTER (OUTPATIENT)
Dept: PSYCHIATRY | Age: 52
Setting detail: THERAPIES SERIES
Discharge: HOME | End: 2022-06-29
Payer: MEDICARE

## 2022-06-29 PROCEDURE — 90837 PSYTX W PT 60 MINUTES: CPT | Performed by: SOCIAL WORKER
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
## 2022-07-06 ENCOUNTER — HOSPITAL ENCOUNTER (OUTPATIENT)
Dept: PSYCHIATRY | Age: 52
Setting detail: THERAPIES SERIES
Discharge: HOME | End: 2022-07-06
Payer: MEDICARE

## 2022-07-06 PROCEDURE — 90837 PSYTX W PT 60 MINUTES: CPT | Performed by: SOCIAL WORKER
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
## 2022-07-10 DIAGNOSIS — R13.19 ESOPHAGEAL DYSPHAGIA: ICD-10-CM

## 2022-07-10 DIAGNOSIS — K86.0 ALCOHOL-INDUCED CHRONIC PANCREATITIS (HCC): ICD-10-CM

## 2022-07-11 DIAGNOSIS — F41.1 GAD (GENERALIZED ANXIETY DISORDER): ICD-10-CM

## 2022-07-11 DIAGNOSIS — G43.019 INTRACTABLE MIGRAINE WITHOUT AURA AND WITHOUT STATUS MIGRAINOSUS: ICD-10-CM

## 2022-07-11 DIAGNOSIS — F33.1 MODERATE EPISODE OF RECURRENT MAJOR DEPRESSIVE DISORDER (HCC): ICD-10-CM

## 2022-07-11 RX ORDER — PANCRELIPASE 24000; 76000; 120000 [USP'U]/1; [USP'U]/1; [USP'U]/1
CAPSULE, DELAYED RELEASE PELLETS ORAL
Qty: 90 CAPSULE | Refills: 1 | Status: SHIPPED | OUTPATIENT
Start: 2022-07-11 | End: 2022-09-12 | Stop reason: SDUPTHER

## 2022-07-11 RX ORDER — CARBAMAZEPINE 200 MG/1
TABLET ORAL
Qty: 90 TABLET | Refills: 5 | Status: SHIPPED | OUTPATIENT
Start: 2022-07-11

## 2022-07-11 RX ORDER — ESCITALOPRAM OXALATE 20 MG/1
20 TABLET ORAL DAILY
Qty: 90 TABLET | Refills: 1 | Status: SHIPPED
Start: 2022-07-11 | End: 2022-08-09 | Stop reason: ALTCHOICE

## 2022-07-11 RX ORDER — TOPIRAMATE 50 MG/1
50 TABLET, FILM COATED ORAL 2 TIMES DAILY
Qty: 60 TABLET | Refills: 1 | Status: SHIPPED | OUTPATIENT
Start: 2022-07-11 | End: 2022-08-29 | Stop reason: SDUPTHER

## 2022-07-11 RX ORDER — ROPINIROLE 1 MG/1
TABLET, FILM COATED ORAL
Qty: 90 TABLET | Refills: 1 | Status: SHIPPED | OUTPATIENT
Start: 2022-07-11

## 2022-07-11 NOTE — TELEPHONE ENCOUNTER
Last visit: 05/04/2022  Last Med refill: 06/02/2022  Does patient have enough medication for 72 hours: No:     Next Visit Date:  Future Appointments   Date Time Provider Lexi Castillo   7/13/2022  2:00 PM Madeline Oas, [address]   7/22/2022 11:00 AM Victor Hugo Soto MD [facility]   7/27/2022  2:00 PM Madeline Oas, [facility]   8/3/2022  2:00 PM Madeline Oas, [facility]   8/10/2022  2:00 PM Madeline Oas, [facility]   8/11/2022 10:30 AM Courtney Wong MD Resp Spec [facility]   8/17/2022  2:00 PM Madeline Oas, [facility]   8/24/2022  2:00 PM Madeline Oas, [facility]   8/29/2022  3:40 PM [facility]   8/31/2022  2:00 PM Madeline Oas, [facility]   9/1/2022 11:15 AM Ludivina Loredo MD [facility]   9/7/2022  2:00 PM Madeline Oas, [facility]   9/14/2022  2:00 PM Madeline Oas, [facility]   9/21/2022  2:00 PM Madeline Oas, [facility]   9/28/2022  2:00 PM Madeline Oas, [facility]   10/5/2022  2:00 PM Madeline Oas, [facility]   10/12/2022  2:00 PM Madeline Oas, [facility]       Health Maintenance   Topic Date Due    Flu vaccine (1) 09/01/2022    Pneumococcal 0-64 years Vaccine (2 - PCV) 11/04/2022    Depression Monitoring  04/04/2023    Breast cancer screen  04/08/2024    Lipids  12/23/2025    DTaP/Tdap/Td vaccine (2 - Td or Tdap) 12/28/2030    Colorectal Cancer Screen  11/19/2031    Shingles vaccine  Completed    COVID-19 Vaccine  Completed    Hepatitis C screen  Completed    HIV screen  Completed    Hepatitis A vaccine  Aged Out    Hepatitis B vaccine  Aged Out    Hib vaccine  Aged Out    Meningococcal (ACWY) vaccine  Aged Out       Hemoglobin A1C (%)   Date Value   04/13/2021 5.5   07/14/2019 4.3 ( goal A1C is < 7)   No results found for: LABMICR  LDL Cholesterol (mg/dL)   Date Value   12/23/2020 134 (H)   06/18/2019 92       (goal LDL is <100)   AST (U/L)   Date Value   10/12/2021 14     ALT (U/L)   Date Value   10/12/2021 7     BUN (mg/dL)   Date Value   01/10/2022 7     BP Readings from Last 3 Encounters:   05/24/22 110/74   05/04/22 132/84   04/04/22 138/80          (goal 120/80)    All Future Testing planned in [facility]  Lab Frequency Next Occurrence   CT ABDOMEN PELVIS W WO CONTRAST Additional Contrast? Radiologist Recommendation Once 09/10/2021   DEXA VERTEBRAL FRACTURE ASSESSMENT Once 08/31/2021   FACET JOINT L/S SINGLE Once 09/16/2021   OT functional capacity evaluation (FCE) Once 12/14/2021   EGD Once 02/04/2022   ERCP Once 02/04/2022               Patient Active Problem List:     Acute bronchitis     Reflex sympathetic dystrophy of lower limb     Essential hypertension     Nicotine addiction     Psychophysiological insomnia     Anxiety     Alcoholic peripheral neuropathy (HCC)     Hypokalemia     Macrocytic anemia     Hypomagnesemia     Tobacco use     Ambulatory dysfunction     Seizure disorder ([address]     COPD with acute exacerbation (HCC)     Paresthesia of lower extremity     Acute respiratory failure with hypoxia (HCC)     Pancreatic cyst     Recurrent major depressive disorder (HCC)     Elevated CA 19-9 level     Perineal mass, female     Esophagitis candidal      Condyloma     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24, the patient underwent 2 surgical resections without known recurrence     Alcohol use disorder, severe, in early remission ([address]     Metabolic encephalopathy     AMAN (generalized anxiety disorder)     Major depressive disorder, recurrent severe without psychotic features ([address]     Esophageal dysphagia     Chronic peripheral neuropathic pain     History of alcohol abuse     History of petit-mal seizures     Intractable episodic tension-type headache     Personal history of astrocytoma     Multilevel spine pain     Chronic pain syndrome     Marijuana abuse     Severe sepsis (HCC)     Closed fracture of fifth thoracic vertebra (HCC)     Diverticulitis of large intestine with abscess without bleeding     Elevated brain natriuretic peptide (BNP) level     Elevated alkaline phosphatase level     Chronic pancreatitis (HCC)     Erythema ab igne     Compression fracture of thoracic vertebra (HCC)     Pathological compression fracture of lumbar vertebra with delayed healing     Metabolic acidosis with increased anion gap and accumulation of organic acids     Community acquired pneumonia of left lower lobe of lung     Septicemia ([address]     Alcohol-induced acute pancreatitis     Fluid collection of pancreas     Diverticulitis of large intestine without perforation or abscess with bleeding     Pseudocyst of pancreas     Bandemia     Sepsis ([address]     Acute recurrent pancreatitis     Atelectasis of left lung     Hyponatremia     Iron deficiency anemia     Adenomatous polyp of sigmoid colon     Moderate episode of recurrent major depressive disorder (HCC)     Sleep disturbance     Intractable migraine without aura and without status migrainosus

## 2022-07-11 NOTE — TELEPHONE ENCOUNTER
Pharmacy requesting refill of topiramate 50 mg.      Medication active on med list yes      Date of last Rx: 12/22/2021 with 3 refills          verified by JEMIMA JONES      Date of last appointment 5/24/2022    Next Visit Date:  8/29/2022

## 2022-07-11 NOTE — TELEPHONE ENCOUNTER
Patient called requesting refill on:  -Escitalopram 20mg    [facility]

## 2022-07-13 ENCOUNTER — CLINICAL DOCUMENTATION (OUTPATIENT)
Dept: PSYCHIATRY | Age: 52
End: 2022-07-13

## 2022-07-13 ENCOUNTER — HOSPITAL ENCOUNTER (OUTPATIENT)
Dept: PSYCHIATRY | Age: 52
Setting detail: THERAPIES SERIES
Discharge: HOME | End: 2022-07-13
Payer: MEDICARE

## 2022-07-13 PROCEDURE — 90837 PSYTX W PT 60 MINUTES: CPT | Performed by: SOCIAL WORKER

## 2022-07-13 RX ORDER — OMEPRAZOLE 40 MG/1
CAPSULE, DELAYED RELEASE ORAL
Qty: 60 CAPSULE | Refills: 2 | Status: SHIPPED | OUTPATIENT
Start: 2022-07-13 | End: 2022-09-19 | Stop reason: SDUPTHER

## 2022-07-27 ENCOUNTER — HOSPITAL ENCOUNTER (OUTPATIENT)
Dept: PSYCHIATRY | Age: 52
Setting detail: THERAPIES SERIES
Discharge: HOME | End: 2022-07-27

## 2022-07-27 NOTE — PROGRESS NOTES
Clinician called and notified client her appointment with Adrian Duncan. Is cancelled due to illness.

## 2022-07-29 DIAGNOSIS — S22.050D COMPRESSION FRACTURE OF T5 VERTEBRA WITH ROUTINE HEALING, SUBSEQUENT ENCOUNTER: ICD-10-CM

## 2022-07-29 DIAGNOSIS — M54.6 ACUTE RIGHT-SIDED THORACIC BACK PAIN: ICD-10-CM

## 2022-08-01 ENCOUNTER — TELEPHONE (OUTPATIENT)
Dept: FAMILY MEDICINE CLINIC | Age: 52
End: 2022-08-01

## 2022-08-01 DIAGNOSIS — E87.1 HYPONATREMIA: Primary | ICD-10-CM

## 2022-08-01 RX ORDER — TIZANIDINE 4 MG/1
TABLET ORAL
Qty: 30 TABLET | Refills: 3 | OUTPATIENT
Start: 2022-08-01

## 2022-08-01 RX ORDER — TRAMADOL HYDROCHLORIDE 50 MG/1
TABLET ORAL
Qty: 20 TABLET | Refills: 0 | Status: SHIPPED | OUTPATIENT
Start: 2022-08-01 | End: 2022-08-08

## 2022-08-01 NOTE — TELEPHONE ENCOUNTER
Patient called asking if she can get lab work for her sodium levels to be checked. She also stated she didn't know if she should be worried about other levels due to the amount of psych meds she is taking.  She stated she is going to [facility] now and will request a medication list to be faxed to our office

## 2022-08-01 NOTE — TELEPHONE ENCOUNTER
Patient called back and states she did not mean for the tizanidine refilled. Pt states she requested the Tramadol to be refilled again because her back has been bothering her, Pt said this is the only time she usually takes this.

## 2022-08-01 NOTE — TELEPHONE ENCOUNTER
I show Tramadol last filled in December and Tizanidine last filled in July 2021. Left the patient a VM to call back regarding this. Looks like she might be established with pain management.  Dr. Barb Tolentino

## 2022-08-03 ENCOUNTER — HOSPITAL ENCOUNTER (OUTPATIENT)
Dept: PSYCHIATRY | Age: 52
Setting detail: THERAPIES SERIES
Discharge: HOME | End: 2022-08-03
Payer: MEDICARE

## 2022-08-03 ENCOUNTER — CLINICAL DOCUMENTATION (OUTPATIENT)
Dept: PSYCHIATRY | Age: 52
End: 2022-08-03

## 2022-08-03 PROCEDURE — 90837 PSYTX W PT 60 MINUTES: CPT | Performed by: SOCIAL WORKER
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
## 2022-08-06 DIAGNOSIS — K31.84 GASTROPARESIS: ICD-10-CM

## 2022-08-08 RX ORDER — METOCLOPRAMIDE 5 MG/1
TABLET ORAL
Qty: 90 TABLET | Refills: 3 | Status: SHIPPED | OUTPATIENT
Start: 2022-08-08

## 2022-08-09 ENCOUNTER — HOSPITAL ENCOUNTER (OUTPATIENT)
Dept: GENERAL RADIOLOGY | Facility: CLINIC | Age: 52
Discharge: HOME | End: 2022-08-11
Payer: MEDICARE

## 2022-08-09 ENCOUNTER — OFFICE VISIT (OUTPATIENT)
Dept: FAMILY MEDICINE CLINIC | Age: 52
End: 2022-08-09
Payer: MEDICARE

## 2022-08-09 ENCOUNTER — HOSPITAL ENCOUNTER (OUTPATIENT)
Facility: CLINIC | Age: 52
Discharge: HOME | End: 2022-08-11
Payer: MEDICARE

## 2022-08-09 VITALS
DIASTOLIC BLOOD PRESSURE: 76 MMHG | BODY MASS INDEX: 25.29 KG/M2 | HEART RATE: 84 BPM | SYSTOLIC BLOOD PRESSURE: 116 MMHG | TEMPERATURE: 97.6 F | OXYGEN SATURATION: 100 % | WEIGHT: 152 LBS

## 2022-08-09 DIAGNOSIS — M54.50 ACUTE MIDLINE LOW BACK PAIN WITHOUT SCIATICA: Primary | ICD-10-CM

## 2022-08-09 DIAGNOSIS — M54.50 ACUTE MIDLINE LOW BACK PAIN WITHOUT SCIATICA: ICD-10-CM

## 2022-08-09 PROCEDURE — 3017F COLORECTAL CA SCREEN DOC REV: CPT | Performed by: INTERNAL MEDICINE

## 2022-08-09 PROCEDURE — 96372 THER/PROPH/DIAG INJ SC/IM: CPT | Performed by: INTERNAL MEDICINE

## 2022-08-09 PROCEDURE — 99213 OFFICE O/P EST LOW 20 MIN: CPT | Performed by: INTERNAL MEDICINE

## 2022-08-09 PROCEDURE — 72100 X-RAY EXAM L-S SPINE 2/3 VWS: CPT

## 2022-08-09 PROCEDURE — G8427 DOCREV CUR MEDS BY ELIG CLIN: HCPCS | Performed by: INTERNAL MEDICINE

## 2022-08-09 PROCEDURE — G8417 CALC BMI ABV UP PARAM F/U: HCPCS | Performed by: INTERNAL MEDICINE

## 2022-08-09 PROCEDURE — 4004F PT TOBACCO SCREEN RCVD TLK: CPT | Performed by: INTERNAL MEDICINE

## 2022-08-09 RX ORDER — KETOROLAC TROMETHAMINE 30 MG/ML
30 INJECTION, SOLUTION INTRAMUSCULAR; INTRAVENOUS ONCE
Status: COMPLETED | OUTPATIENT
Start: 2022-08-09 | End: 2022-08-09

## 2022-08-09 RX ORDER — METHYLPREDNISOLONE 4 MG/1
TABLET ORAL
Qty: 1 KIT | Refills: 0 | Status: SHIPPED | OUTPATIENT
Start: 2022-08-09

## 2022-08-09 RX ORDER — SOLIFENACIN SUCCINATE 10 MG/1
1 TABLET, FILM COATED ORAL DAILY
COMMUNITY
Start: 2022-07-20

## 2022-08-09 RX ORDER — BREXPIPRAZOLE 0.5 MG/1
1 TABLET ORAL DAILY
COMMUNITY
Start: 2022-07-21 | End: 2022-09-01

## 2022-08-09 RX ORDER — BUSPIRONE HYDROCHLORIDE 30 MG/1
1 TABLET ORAL 2 TIMES DAILY
COMMUNITY
Start: 2022-07-21

## 2022-08-09 RX ADMIN — KETOROLAC TROMETHAMINE 30 MG: 30 INJECTION, SOLUTION INTRAMUSCULAR; INTRAVENOUS at 12:03

## 2022-08-09 SDOH — ECONOMIC STABILITY: FOOD INSECURITY: WITHIN THE PAST 12 MONTHS, THE FOOD YOU BOUGHT JUST DIDN'T LAST AND YOU DIDN'T HAVE MONEY TO GET MORE.: NEVER TRUE

## 2022-08-09 SDOH — ECONOMIC STABILITY: FOOD INSECURITY: WITHIN THE PAST 12 MONTHS, YOU WORRIED THAT YOUR FOOD WOULD RUN OUT BEFORE YOU GOT MONEY TO BUY MORE.: NEVER TRUE

## 2022-08-09 ASSESSMENT — ENCOUNTER SYMPTOMS
BACK PAIN: 1
BOWEL INCONTINENCE: 0
ABDOMINAL PAIN: 0

## 2022-08-09 ASSESSMENT — SOCIAL DETERMINANTS OF HEALTH (SDOH): HOW HARD IS IT FOR YOU TO PAY FOR THE VERY BASICS LIKE FOOD, HOUSING, MEDICAL CARE, AND HEATING?: NOT VERY HARD

## 2022-08-09 NOTE — PROGRESS NOTES
[facility]  [facility] PHYS POINT [address]  [address] [facility] [address]  Dept: [phone]  Dept Fax: [phone]      Jefry Solis is a 47 y.o. female who presents today for hermedical conditions/complaints as noted below. Jefry Solis is c/o of Back Pain        Assessment/Plan:     1. Acute midline low back pain without sciatica  -     methylPREDNISolone (MEDROL DOSEPACK) 4 MG tablet; Take by mouth., Disp-1 kit, R-0Normal  -     XR LUMBAR SPINE (2-3 VIEWS); Future        No follow-ups on file. HPI     Back Pain  This is a new problem. The current episode started 1 to 4 weeks ago. The problem occurs constantly. The problem is unchanged. The pain is present in the lumbar spine. The quality of the pain is described as aching, cramping and shooting. The pain does not radiate. The pain is moderate. The pain is Worse during the day. The symptoms are aggravated by bending, standing, twisting and coughing. Pertinent negatives include no abdominal pain, bladder incontinence, bowel incontinence, chest pain, dysuria, fever, headaches, leg pain, numbness, paresis, paresthesias, pelvic pain, perianal numbness, tingling, weakness or weight loss. She has tried analgesics, muscle relaxant, ice, bed rest, heat and walking for the symptoms. The treatment provided mild relief.      BP Readings from Last 3 Encounters:   08/09/22 116/76   05/24/22 110/74   05/04/22 132/84              Past Medical History:   Diagnosis Date    Acute respiratory failure with hypoxia ([address] 10/16/2020    Alcohol withdrawal syndrome, with delirium ([address] 12/14/2019    Alcoholism ([address]     Anal warts     Anemia 10/2020    GI bleed    Anxiety     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24, the patient underwent 2 surgical resections without known recurrence 10/23/2020    at age 24    Closed fracture of lateral portion of left tibial plateau 34/29/6762    Condyloma     COPD (chronic obstructive pulmonary disease) ([address] CO2 retainer, on Bipap at night for this, Dr. Beverly Evans ( last visit 11/20/2020 and note on chart )    Depression      major depressive disorder, ptsd, anxiety    Dysphagia     GI bleed 10/2020    Hypertension     Memory loss     Oxygen dependent     USES  3L/MIN DAILY PRN AND NIGHTLY CONTINUOUSLY    Pain, joint, ankle and foot     Pancreatic lesion 10/2020    Dr. Barrett Lundborg working up pt    Perianal wart     Peripheral neuropathy     Seizures ([address]     LAST SEIZURE 3/2020 - FEELS IT WAS FROM ALCOHOL WITHDRAWL    Tension headache     Under care of team 09/29/2021    [facility]    Under care of team 09/29/2021    pain management-Hamzah Martines-[facility]-last visit sep 2021    Under care of team     pulmonology-Dr Dueñas-[facility]-due for visit March 2022    Under care of team 09/29/2021    psych-bahnfeldt NP-telemed-last visit 10/ 2021    Under care of team 09/29/2021    ip-[address]-last visit aug 2021    Under care of team     NEPHROLOGY - DR. LEE    Wears glasses     Wears partial dentures     UPPER    Wellness examination     pcp-Ludivina grimes-last visit Jan 5, 2022      Past Surgical History:   Procedure Laterality Date    ANUS SURGERY N/A 01/25/2022    EXCISION AND FULGURATION, ANAL, VAGINAL AND PERIANAL WARTS WITH CO2 LASER, FORTEC performed by Starlene Lefort, MD at [address] times 2    COLONOSCOPY N/A 10/22/2020    COLONOSCOPY DIAGNOSTIC performed by Ange Yun MD at [facility] Endoscopy    COLONOSCOPY N/A 11/19/2021    COLONOSCOPY W/ ENDOSCOPIC MUCOSAL RESECTION performed by Ange Yun MD at [facility] Endoscopy    [address]  07/28/2021    CT ABSCESS DRAIN SUBCUTANEOUS 7/28/2021 [facility] CT SCAN    ENDOSCOPIC ULTRASOUND (LOWER) N/A 12/09/2020    ENDOSCOPIC ULTRASOUND, UPPER WITH LINEAR SCOPE FOR BIOPSY OF MASS ON HEAD OF PANCREAS performed by Aimee Mo MD at [address] A ULTRASOUND (UPPER)  02/09/2022    ENDOSCOPIC ULTRASOUND, EGD - GI SCHEDULED,EGD STENT REMOVAL    ERCP N/A 08/11/2021    ERCP STENT INSERTION performed by Teddy Hercules MD at [facility]    ERCP  08/11/2021    ERCP SPHINCTER/PAPILLOTOMY performed by Teddy Hercules MD at [facility]    ERCP N/A 10/21/2021    ERCP STENT REMOVAL/EXCHANGE performed by Teddy Hercules MD at [facility]    ERCP  10/21/2021    ERCP STONE REMOVAL performed by Teddy Hercules MD at [facility]    ERCP  10/21/2021    ERCP ENDOSCOPIC RETROGRADE CHOLANGIOPANCREATOGRAPHY DIRECT VISUALIZATION performed by Teddy Hercules MD at [facility] [place] Left 07/03/2013    ORIF tibial plateau    FRACTURE SURGERY Right     small finger metacarpal fracture    HAND SURGERY      HYSTERECTOMY  2003    SPINE SURGERY N/A 09/10/2021    KYPHOPLASTY lumbar L5 performed by Jessie Montana MD at [address] [place] [address] N/A 10/22/2020    EGD BIOPSY performed by Laure Card MD at [address] [place] [address] N/A 04/05/2021    EGD BIOPSY performed by Laure Card MD at [address] [place] [address] N/A 09/08/2021    ENDOSCOPIC ULTRASOUND, LINEAR SCOPE performed by Dalia Varela MD at [address] [place] [address] N/A 2/9/2022    ENDOSCOPIC ULTRASOUND, EGD - GI SCHEDULED performed by Teddy Hercules MD at [address] [place] [address]  2/9/2022    EGD STENT REMOVAL performed by Teddy Hercules MD at [place] History   Problem Relation Age of Onset    Other Father     Cancer Maternal Grandmother     Heart Disease Paternal Grandmother     Esophageal Cancer Maternal Aunt     Arthritis Mother        Social History     Tobacco Use    Smoking status: Every Day     Packs/day: 0.50     Years: 30.00     Pack years: 15.00     Types: Cigarettes    Smokeless tobacco: Never    Tobacco comments:     was smoking 1 ppd   Substance Use Topics    Alcohol use: Not Currently Alcohol/week: 0.0 standard drinks        No Known Allergies  Prior to Visit Medications    Medication Sig Taking? Authorizing Provider   metoclopramide (REGLAN) 5 MG tablet TAKE ONE TABLET BY MOUTH THREE TIMES A DAY Yes Elkin Borges MD   omeprazole (PRILOSEC) 40 MG delayed release capsule TAKE ONE CAPSULE BY MOUTH TWICE A DAY Yes Elkin Borges MD   rOPINIRole (REQUIP) 1 MG tablet TAKE ONE TABLET BY MOUTH ONCE NIGHTLY Yes LOI Steven NP   carBAMazepine (TEGRETOL) 200 MG tablet TAKE ONE TABLET BY MOUTH THREE TIMES A DAY Yes LOI Steven NP   CREON 29146-32549 units delayed release capsule TAKE ONE CAPSULE BY MOUTH THREE TIMES A DAY WITH MEALS Yes LOI Steven NP   topiramate (TOPAMAX) 50 MG tablet Take 1 tablet by mouth 2 times daily Yes LOI Waller CNP   Fremanezumab-vfrm (AJOVY) 225 MG/1.5ML SOAJ Inject 225 mg into the skin every 30 days Yes LOI Waller CNP   hydrOXYzine (ATARAX) 25 MG tablet Take 1 tablet by mouth 3 times daily as needed for Itching Yes Ludivina Alanis MD   mirtazapine (REMERON) 7.5 MG tablet Take 1 tablet by mouth nightly as needed (sleep) Yes Ludivina Alanis MD   sucralfate (CARAFATE) 1 GM tablet TAKE ONE TABLET BY MOUTH FOUR TIMES A DAY Yes Elkin Borges MD   Handicap [facility] [address] by Does not apply route Expires 5/2027 Yes Ludivina Alanis MD   prazosin (MINIPRESS) 1 MG capsule Take 1 capsule by mouth nightly Yes LOI Steven NP   amLODIPine (NORVASC) 10 MG tablet TAKE ONE TABLET BY MOUTH DAILY Yes LOI Steven NP   pregabalin (LYRICA) 200 MG capsule TAKE ONE CAPSULE BY MOUTH THREE TIMES A DAY Yes LOI Waller CNP   nicotine polacrilex (NICOTINE MINI) 4 MG lozenge Take 1 lozenge by mouth as needed for Smoking cessation Weeks 1 to 6: 1 lozenge every 1 to 2 hours. Weeks 7 to 9: 1 lozenge every 2 to 4 hours. Weeks 10 to 12: 1 lozenge every 4 to 8 hours. Maximum 20 lozenges per day.  Yes LOI Steven - NP   sodium chloride 1 g tablet Take 1 tablet by mouth 4 times daily Note increase in dose per Dr Edna Lopez MD   verapamil (CALAN SR) 120 MG extended release tablet Take 1 tablet by mouth daily Yes Bhavin Chance MD   senna (SENOKOT) 8.6 MG tablet Take 1 tablet by mouth 2 times daily Yes Reyna Antoine DO   fluticasone-umeclidin-vilant (TRELEGY ELLIPTA) 100-62.5-25 MCG/INH AEPB Inhale 1 puff into the lungs daily Yes Ludivina Day MD   simethicone (MYLICON) 80 MG chewable tablet Take 1 tablet by mouth 4 times daily as needed for Flatulence Yes Etienne Danielle MD   albuterol sulfate HFA (VENTOLIN HFA) 108 (90 Base) MCG/ACT inhaler Inhale 2 puffs into the lungs 4 times daily as needed for Wheezing Yes Maude Lozano MD   vitamin D (ERGOCALCIFEROL) 81517 units CAPS capsule Take 1 capsule by mouth once a week Yes Ludivina Day MD   folic acid (FOLVITE) 1 MG tablet Take 1 tablet by mouth daily Yes Ludivina Day MD   busPIRone (BUSPAR) 30 MG tablet Take 1 tablet by mouth in the morning and at bedtime  Elizabeth Dey   solifenacin (VESICARE) 10 MG tablet Take 1 tablet by mouth in the morning. Xiomara MASTERS MD   REXULTI 0.5 MG TABS tablet Take 1 tablet by mouth daily  Elizabeth Dey   neomycin-polymyxin-dexameth (MAXITROL) 3.5-28566-6.1 ophthalmic suspension   Historical Provider, MD   lidocaine (XYLOCAINE) 5 % ointment Apply topically as needed. Patient not taking: Reported on 8/9/2022  Reyna Antoine DO       Review of Systems     Review of Systems   Constitutional:  Negative for fever and weight loss. Cardiovascular:  Negative for chest pain. Gastrointestinal:  Negative for abdominal pain and bowel incontinence. Genitourinary:  Negative for bladder incontinence, dysuria and pelvic pain. Musculoskeletal:  Positive for back pain. Neurological:  Negative for tingling, weakness, numbness, headaches and paresthesias.    All other systems reviewed and are negative. Objective     /76   Pulse 84   Temp 97.6 °F (36.4 °C) (Temporal)   Wt 152 lb (68.9 kg)   SpO2 100%   BMI 25.29 kg/m²   Physical Exam  Vitals and nursing note reviewed. Constitutional:       General: She is not in acute distress. Appearance: She is not ill-appearing. Musculoskeletal:      Lumbar back: Spasms, tenderness and bony tenderness present. No swelling or deformity. Decreased range of motion. Negative right straight leg raise test and negative left straight leg raise test.   Neurological:      Mental Status: She is alert. Data Review       There are no preventive care reminders to display for this patient. Patient given educational materials- see patient instructions. Discussed use, benefit, and side effects of prescribedmedications. All patient questions answered. Pt voiced understanding. Reviewedhealth maintenance. Instructed to continue current medications, diet and exercise. Patient agreed with treatment plan. Follow up as directed.      Electronically signedby Fabiola Jane MD on 8/9/2022

## 2022-08-09 NOTE — PROGRESS NOTES
Pt is here due to severe back pain since 07/31/2022.  She has tried Tramadol since 8/1/2022 but no relief  She states does get spasms now and then

## 2022-08-10 ENCOUNTER — HOSPITAL ENCOUNTER (OUTPATIENT)
Dept: PSYCHIATRY | Age: 52
Setting detail: THERAPIES SERIES
Discharge: HOME | End: 2022-08-10
Payer: MEDICARE

## 2022-08-10 ENCOUNTER — CLINICAL DOCUMENTATION (OUTPATIENT)
Dept: PSYCHIATRY | Age: 52
End: 2022-08-10

## 2022-08-10 DIAGNOSIS — M54.6 ACUTE RIGHT-SIDED THORACIC BACK PAIN: ICD-10-CM

## 2022-08-10 PROCEDURE — 90837 PSYTX W PT 60 MINUTES: CPT | Performed by: SOCIAL WORKER

## 2022-08-10 RX ORDER — TIZANIDINE 4 MG/1
TABLET ORAL
Qty: 30 TABLET | Refills: 3 | OUTPATIENT
Start: 2022-08-10
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
## 2022-08-12 ENCOUNTER — TELEPHONE (OUTPATIENT)
Dept: FAMILY MEDICINE CLINIC | Age: 52
End: 2022-08-12

## 2022-08-12 NOTE — TELEPHONE ENCOUNTER
Patient's mom called asking for results of xray. Mom states patient is still having back pain and states the Toradol she received in the office did not help. Mom states she is having trouble moving around and walking. Did advise if her pain is getting worse and having trouble moving it may be best to go to the ER.

## 2022-08-15 ENCOUNTER — TELEPHONE (OUTPATIENT)
Dept: FAMILY MEDICINE CLINIC | Age: 52
End: 2022-08-15

## 2022-08-15 NOTE — TELEPHONE ENCOUNTER
Patient called and was wondering what she needs to do with her prednisone. She stated she took two days worth and had to stop due to \"gut issues\".  She stated she still has four days left (10 pills)    She is asking if she needs a new script or if she picks up where she left off

## 2022-08-15 NOTE — TELEPHONE ENCOUNTER
No acute changes, stable vertebral body compressions compared to previous CT imaging from a year ago.   If her pain is persistent without improvement, can either go to the ER if having trouble moving or we can refer to pain management

## 2022-08-15 NOTE — TELEPHONE ENCOUNTER
Pt informed of results, pt states she is still in a lot of pain, pt states she will call back, when she figures out what she wants to do, did mention a referral to a spine doctor, not pain management, she may go to a walk-in or [facility] ER, not comfortable going to ER because las time she was there for 12 hrs and they never put her in a room, and is worried about COVID. Dilcia Ortiz  will call back when she decides what to do

## 2022-08-18 NOTE — TELEPHONE ENCOUNTER
Last visit: 05/04/2022  Last Med refill: 04/20/2022  Does patient have enough medication for 72 hours: Yes    Next Visit Date:  Future Appointments   Date Time Provider Lexi Castillo   7/13/2022  2:00 PM Murray Osorio, Juan Valderrama   7/22/2022 11:00 AM Jayme Frias MD [facility]   7/27/2022  2:00 PM Murray Osorio, L[facility]W [facility]   8/3/2022  2:00 PM Murray Osorio, LISW [facility]   8/10/2022  2:00 PM Murray Osorio, LISW [facility]   8/11/2022 10:30 AM Geoffrey Ahumada MD Resp Spec [facility]   8/17/2022  2:00 PM Murray Osorio, L[facility]W [facility]   8/24/2022  2:00 PM Murray Osorio, LISW [facility]   8/29/2022  3:40 PM LOI Gee - CNP Neuro Spec [facility]   8/31/2022  2:00 PM Murray Osorio, LISW [facility]   9/1/2022 11:15 [facility]   9/7/2022  2:00 PM Murray Osorio, [facility]W [facility]   9/14/2022  2:00 PM Murray Osorio, L[facility]W [facility]   9/21/2022  2:00 PM Murray Osorio, L[facility]W [facility]   9/28/2022  2:00 PM Murray Osorio, [facility]W [facility]   10/5/2022  2:00 PM Murray Osorio, L[facility]W [facility]   10/12/2022  2:00 PM Murray Osorio, [facility]W [facility]       Health Maintenance   Topic Date Due    Flu vaccine (1) 09/01/2022    Pneumococcal 0-64 years Vaccine (2 - PCV) 11/04/2022    Depression Monitoring  04/04/2023    Breast cancer screen  04/08/2024    Lipids  12/23/2025    DTaP/Tdap/Td vaccine (2 - Td or Tdap) 12/28/2030    Colorectal Cancer Screen  11/19/2031    Shingles vaccine  Completed    COVID-19 Vaccine  Completed    Hepatitis C screen  Completed    HIV screen  Completed    Hepatitis A vaccine  Aged Out    Hepatitis B vaccine  Aged Out    Hib vaccine  Aged Out    Meningococcal (ACWY) vaccine  Aged Out       Hemoglobin A1C (%)   Date Value   04/13/2021 5.5   07/14/2019 4.3 ( goal A1C is < 7)   No results found for: LABMICR  LDL Cholesterol (mg/dL)   Date Value   12/23/2020 134 (H)   06/18/2019 92       (goal LDL is <100)   AST (U/L)   Date Value   10/12/2021 14     ALT (U/L)   Date Value   10/12/2021 7     BUN (mg/dL)   Date Value   01/10/2022 7     BP Readings from Last 3 Encounters:   05/24/22 110/74   05/04/22 132/84   04/04/22 138/80          (goal 120/80)    All Future Testing planned in [facility]  Lab Frequency Next Occurrence   CT ABDOMEN PELVIS W WO CONTRAST Additional Contrast? Radiologist Recommendation Once 09/10/2021   DEXA VERTEBRAL FRACTURE ASSESSMENT Once 08/31/2021   FACET JOINT L/S SINGLE Once 09/16/2021   OT functional capacity evaluation (FCE) Once 12/14/2021   EGD Once 02/04/2022   ERCP Once 02/04/2022               Patient Active Problem List:     Acute bronchitis     Reflex sympathetic dystrophy of lower limb     Essential hypertension     Nicotine addiction     Psychophysiological insomnia     Anxiety     Alcoholic peripheral neuropathy (HCC)     Hypokalemia     Macrocytic anemia     Hypomagnesemia     Tobacco use     Ambulatory dysfunction     Seizure disorder ([address]     COPD with acute exacerbation (HCC)     Paresthesia of lower extremity     Acute respiratory failure with hypoxia (HCC)     Pancreatic cyst     Recurrent major depressive disorder (HCC)     Elevated CA 19-9 level     Perineal mass, female     Esophagitis candidal      Condyloma     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24, the patient underwent 2 surgical resections without known recurrence     Alcohol use disorder, severe, in early remission ([address]     Metabolic encephalopathy     AMAN (generalized anxiety disorder)     Major depressive disorder, recurrent severe without psychotic features ([address]     Esophageal dysphagia     Chronic peripheral neuropathic pain     History of alcohol abuse     History of petit-mal seizures     Intractable episodic tension-type headache     Personal history of astrocytoma     Multilevel spine pain     Chronic pain syndrome     Marijuana abuse     Severe sepsis (HCC)     Closed fracture of fifth thoracic vertebra (HCC)     Diverticulitis of large intestine with abscess without bleeding     Elevated brain natriuretic peptide (BNP) level     Elevated alkaline phosphatase level     Chronic pancreatitis (HCC)     Erythema ab igne     Compression fracture of thoracic vertebra (HCC)     Pathological compression fracture of lumbar vertebra with delayed healing     Metabolic acidosis with increased anion gap and accumulation of organic acids     Community acquired pneumonia of left lower lobe of lung     Septicemia ([address]     Alcohol-induced acute pancreatitis     Fluid collection of pancreas     Diverticulitis of large intestine without perforation or abscess with bleeding     Pseudocyst of pancreas     Bandemia     Sepsis ([address]     Acute recurrent pancreatitis     Atelectasis of left lung     Hyponatremia     Iron deficiency anemia     Adenomatous polyp of sigmoid colon     Moderate episode of recurrent major depressive disorder (HCC)     Sleep disturbance     Intractable migraine without aura and without status migrainosus Rituxan Counseling:  I discussed with the patient the risks of Rituxan infusions. Side effects can include infusion reactions, severe drug rashes including mucocutaneous reactions, reactivation of latent hepatitis and other infections and rarely progressive multifocal leukoencephalopathy.  All of the patient's questions and concerns were addressed.

## 2022-08-24 ENCOUNTER — HOSPITAL ENCOUNTER (OUTPATIENT)
Dept: PSYCHIATRY | Age: 52
Setting detail: THERAPIES SERIES
Discharge: HOME | End: 2022-08-24
Payer: MEDICARE

## 2022-08-24 PROCEDURE — 90837 PSYTX W PT 60 MINUTES: CPT | Performed by: SOCIAL WORKER

## 2022-08-25 RX ORDER — SUCRALFATE 1 G/1
TABLET ORAL
Qty: 120 TABLET | Refills: 3 | Status: SHIPPED | OUTPATIENT
Start: 2022-08-25 | End: 2022-09-19 | Stop reason: SDUPTHER

## 2022-08-25 NOTE — PROGRESS NOTES
[facility] Therapy Note in [facility], [address]   8/24/2022  2:00 PM  Nomi Fulton  1970  [id number]    Time spent with Patient: 60 minutes      Pt was provided informed consent for the [facility]. Discussed with patient model of service to include the limits of confidentiality (i.e. abuse reporting, suicide intervention, etc.) and short-term intervention focused approach. Pt indicated understanding. S:  Therapist and pt engaged in check in. Therapist offered pt emotional support and discussed pt's changes in medication and how it has been affecting her. Therapist led pt in cognitive techniques to explore and reframe feelings of \"uselessness\" now that she is injured. Pt completed new PCL and reviewed results   Therapist led pt in EMDR processing and continued to process an experience with F. Therapist and pt focused on negative thought of \"There's something wrong with me. \"  Through processing pt's SARAH remained high. Therapist led pt in grounding and resourcing to stabilize. O:  MSE:     Appearance    alert, cooperative, mild distress, cring  Appetite normal  Sleep disturbance Yes  Fatigue Yes  Loss of pleasure No  Impulsive behavior No  Speech    normal rate, normal volume, and well articulated  Mood    Anxious  Depressed  Worthless  Affect    anxiety  Thought Content    worthlessness and cognitive distortions  Thought Process    slow  Associations    logical connections  Insight    Good  Judgment    Intact  Orientation    oriented to person, place, time, and general circumstances  Memory    recent and remote memory intact  Attention/Concentration    intact  Morbid ideation No  Suicide Assessment    no suicidal ideation    A:  Pt presented to session as anxious and slightly dysregulated.   Pt identified an increase in \"weepiness\" since beginning her new medicine and is working on finding the right therapeutic dose with her psychiatrist.  Pt scored a 50 on her WITHDRAWL    Tension headache     Under care of team 09/29/2021    neuro-Dr Coyle-[facility]-last visit sep 2021    Under care of team 09/29/2021    pain management-Hamzah Martines-[facility]-last visit sep 2021    Under care of team     pulmonology-Dr Dueñas-[facility]-due for visit March 2022    Under care of team 09/29/2021    psych-bahnfeldt NP-telemed-last visit 10/ 2021    Under care of team 09/29/2021    tl[address]-last visit aug 2021    Under care of team     NEPHROLOGY - DR. LEE    Wears glasses     Wears partial dentures     UPPER    Wellness examination     pcp-Ludivina grimes-last visit Jan 5, 2022     Medications:   Current Outpatient Medications   Medication Sig Dispense Refill    busPIRone (BUSPAR) 30 MG tablet Take 1 tablet by mouth in the morning and at bedtime      solifenacin (VESICARE) 10 MG tablet Take 1 tablet by mouth in the morning. REXULTI 0.5 MG TABS tablet Take 1 tablet by mouth daily      methylPREDNISolone (MEDROL DOSEPACK) 4 MG tablet Take by mouth.  1 kit 0    metoclopramide (REGLAN) 5 MG tablet TAKE ONE TABLET BY MOUTH THREE TIMES A DAY 90 tablet 3    omeprazole (PRILOSEC) 40 MG delayed release capsule TAKE ONE CAPSULE BY MOUTH TWICE A DAY 60 capsule 2    rOPINIRole (REQUIP) 1 MG tablet TAKE ONE TABLET BY MOUTH ONCE NIGHTLY 90 tablet 1    carBAMazepine (TEGRETOL) 200 MG tablet TAKE ONE TABLET BY MOUTH THREE TIMES A DAY 90 tablet 5    CREON 90087-09020 units delayed release capsule TAKE ONE CAPSULE BY MOUTH THREE TIMES A DAY WITH MEALS 90 capsule 1    topiramate (TOPAMAX) 50 MG tablet Take 1 tablet by mouth 2 times daily 60 tablet 1    Fremanezumab-vfrm (AJOVY) 225 MG/1.5ML SOAJ Inject 225 mg into the skin every 30 days 1 pen 5    hydrOXYzine (ATARAX) 25 MG tablet Take 1 tablet by mouth 3 times daily as needed for Itching 90 tablet 0    mirtazapine (REMERON) 7.5 MG tablet Take 1 tablet by mouth nightly as needed (sleep) 30 tablet 0 sucralfate (CARAFATE) 1 GM tablet TAKE ONE TABLET BY MOUTH FOUR TIMES A  tablet 3    Handicap Placard MISC by Does not apply route Expires 5/2027 1 each 0    prazosin (MINIPRESS) 1 MG capsule Take 1 capsule by mouth nightly 30 capsule 3    amLODIPine (NORVASC) 10 MG tablet TAKE ONE TABLET BY MOUTH DAILY 90 tablet 1    pregabalin (LYRICA) 200 MG capsule TAKE ONE CAPSULE BY MOUTH THREE TIMES A DAY 90 capsule 5    nicotine polacrilex (NICOTINE MINI) 4 MG lozenge Take 1 lozenge by mouth as needed for Smoking cessation Weeks 1 to 6: 1 lozenge every 1 to 2 hours. Weeks 7 to 9: 1 lozenge every 2 to 4 hours. Weeks 10 to 12: 1 lozenge every 4 to 8 hours. Maximum 20 lozenges per day. 100 each 3    sodium chloride 1 g tablet Take 1 tablet by mouth 4 times daily Note increase in dose per Dr Grzegorz Kendall 360 tablet 3    neomycin-polymyxin-dexameth (MAXITROL) 3.5-55466-0.1 ophthalmic suspension       verapamil (CALAN SR) 120 MG extended release tablet Take 1 tablet by mouth daily 90 tablet 1    senna (SENOKOT) 8.6 MG tablet Take 1 tablet by mouth 2 times daily 60 tablet 11    lidocaine (XYLOCAINE) 5 % ointment Apply topically as needed. (Patient not taking: Reported on 8/9/2022) 30 g 1    fluticasone-umeclidin-vilant (TRELEGY ELLIPTA) 100-62.5-25 MCG/INH AEPB Inhale 1 puff into the lungs daily 1 each 5    simethicone (MYLICON) 80 MG chewable tablet Take 1 tablet by mouth 4 times daily as needed for Flatulence 180 tablet 3    albuterol sulfate HFA (VENTOLIN HFA) 108 (90 Base) MCG/ACT inhaler Inhale 2 puffs into the lungs 4 times daily as needed for Wheezing 1 Inhaler 5    vitamin D (ERGOCALCIFEROL) 29982 units CAPS capsule Take 1 capsule by mouth once a week 12 capsule 1    folic acid (FOLVITE) 1 MG tablet Take 1 tablet by mouth daily 90 tablet 1     No current facility-administered medications for this encounter.        Social History:   Social History     Socioeconomic History    Marital status: Single     Spouse name: Not on file    Number of children: Not on file    Years of education: Not on file    Highest education level: Not on file   Occupational History    Not on file   Tobacco Use    Smoking status: Every Day     Packs/day: 0.50     Years: 30.00     Pack years: 15.00     Types: Cigarettes    Smokeless tobacco: Never    Tobacco comments:     was smoking 1 ppd   Vaping Use    Vaping Use: Never used   Substance and Sexual Activity    Alcohol use: Not Currently     Alcohol/week: 0.0 standard drinks    Drug use: Yes     Frequency: 2.0 times per week     Comment: recreation    Sexual activity: Not Currently   Other Topics Concern    Not on file   Social History Narrative    Not on file     Social Determinants of Health     Financial Resource Strain: Low Risk     Difficulty of Paying Living Expenses: Not very hard   Food Insecurity: No Food Insecurity    Worried About Running Out of Food in the Last Year: Never true    Ran Out of Food in the Last Year: Never true   Transportation Needs: Not on file   Physical Activity: Not on file   Stress: Not on file   Social Connections: Not on file   Intimate Partner Violence: Not on file   Housing Stability: Not on file       TOBACCO:   reports that she has been smoking cigarettes. She has a 15.00 pack-year smoking history. She has never used smokeless tobacco.  ETOH:   reports that she does not currently use alcohol.     Family History:   Family History   Problem Relation Age of Onset    Other Father     Cancer Maternal Grandmother     Heart Disease Paternal Grandmother     Esophageal Cancer Maternal Aunt     Arthritis Mother            Pt interventions:  Trained in relaxation strategies, Provided education on PTSD symptoms and treatment options for evidence-based treatment (Cognitive Processing Therapy and Prolonged Exposure), Established rapport, Conducted functional assessment, Supportive techniques, CBT to target negative thoughts , and Identified maladaptive thoughts, EMDR        PLAN:

## 2022-08-29 ENCOUNTER — OFFICE VISIT (OUTPATIENT)
Dept: NEUROLOGY | Age: 52
End: 2022-08-29
Payer: MEDICARE

## 2022-08-29 VITALS
WEIGHT: 152 LBS | SYSTOLIC BLOOD PRESSURE: 121 MMHG | BODY MASS INDEX: 25.33 KG/M2 | DIASTOLIC BLOOD PRESSURE: 88 MMHG | HEIGHT: 65 IN | HEART RATE: 84 BPM

## 2022-08-29 DIAGNOSIS — C71.9 ASTROCYTOMA (HCC): Primary | ICD-10-CM

## 2022-08-29 DIAGNOSIS — G43.019 INTRACTABLE MIGRAINE WITHOUT AURA AND WITHOUT STATUS MIGRAINOSUS: ICD-10-CM

## 2022-08-29 DIAGNOSIS — G40.909 SEIZURE DISORDER (HCC): ICD-10-CM

## 2022-08-29 PROCEDURE — 99214 OFFICE O/P EST MOD 30 MIN: CPT | Performed by: NURSE PRACTITIONER

## 2022-08-29 PROCEDURE — 4004F PT TOBACCO SCREEN RCVD TLK: CPT | Performed by: NURSE PRACTITIONER

## 2022-08-29 PROCEDURE — 3017F COLORECTAL CA SCREEN DOC REV: CPT | Performed by: NURSE PRACTITIONER

## 2022-08-29 PROCEDURE — G8427 DOCREV CUR MEDS BY ELIG CLIN: HCPCS | Performed by: NURSE PRACTITIONER

## 2022-08-29 PROCEDURE — G8417 CALC BMI ABV UP PARAM F/U: HCPCS | Performed by: NURSE PRACTITIONER

## 2022-08-29 RX ORDER — TOPIRAMATE 50 MG/1
50 TABLET, FILM COATED ORAL 2 TIMES DAILY
Qty: 60 TABLET | Refills: 5 | Status: SHIPPED | OUTPATIENT
Start: 2022-08-29

## 2022-08-29 NOTE — PROGRESS NOTES
[facility]            [address]          [place], [address] [facility]          Dept: [phone]          Dept Fax: [phone]    MD Chidi Khoury MD Lynden Bough, MD Cliffton Potter, CNP            8/29/2022      HISTORY OF PRESENT ILLNESS:       I had the pleasure of seeing Hieu Gomez, who returns for continuing neurologic care. The patient was seen last on May 24, 2022 for treatment of migraine headaches, seizure disorder, peripheral neuropathy, cognitive impairment L4-5 radiculopathy and a history of an astrocytoma status post two resections in 1989. For prophylaxis of her migraine headaches she was prescribed topamax 50 mg twice daily, verapamil 60 mg daily and ajovy 225 mg injections every 30 days. For abortive therapy she was prescribed maxalt. She is here today reporting that she has not taken ajovy prior to today's visit as her headaches have improved. She has been following with psychiatry who has adjusted her psych medications which has improved her headache frequency. Headache location: holoacranial   Headache quality: pressure  Associated factors:  Nausea, vomiting, photophobia, phonophobia  Intensity: 8/10  Headache chronicity: 2 years  Headache frequency: 1-2 headaches/month   Aggravating factors : bright lights, loud sounds  Relieving factors: none currently   Prophylactic medications: topamax, verapamil, Lyrica,  Abortive medications: none   Previously used medications: gabapentin, lexapro, magnesium, prozac    She also has a seizure disorder with her last seizure occurring in March 2020. For seizure prophylaxis she was prescribed tegretol 200 mg three times daily, lyrica 200 mg three times daily and topamax 50 mg twice daily. She is here today reporting that she is interested in tapering off tegretol as she has remained seizure free for greater than two years.      She has a peripheral neuropathy likely secondary to a history of alcohol abuse and was prescribed lyrica 200 mg three times daily. She also has a cognitive impairment secondary to her history of alcohol abuse. For management of her L4-5 radiculopathy she was prescribed lyrica 200 mg three times daily. She also has a history of an astrocytoma status post two resection in 1989. Testing reviewed:    MRI Brain W WO Contrast 12/31/2021  Impression   No acute intracranial abnormality. No abnormal postcontrast enhancement. MRI Lumbar Spine WO Contrast 7/23/2021  Impression   1. Acute compression deformity of the superior endplate of L5 with   approximately 35% loss of height. No significant bulging of the posterior   cortex. 2. No significant spinal canal stenosis of the lumbar spine. 3. Neural foraminal narrowing at L3-L4 through L5-S1.   4. Minimal retrolisthesis at L5-S1. EEG 7/29/21:  abnormal EEG secondary to generalized slowing without focal or  paroxysmal abnormality. Carbamazepine (7/25/2021): Total level 7.7 (4-12). ,  Free level: 2.3           PAST MEDICAL HISTORY:         Diagnosis Date    Acute respiratory failure with hypoxia ([address] 10/16/2020    Alcohol withdrawal syndrome, with delirium ([address] 12/14/2019    Alcoholism ([address]     Anal warts     Anemia 10/2020    GI bleed    Anxiety     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24, the patient underwent 2 surgical resections without known recurrence 10/23/2020    at age 24    Closed fracture of lateral portion of left tibial plateau 07/60/4612    Condyloma     COPD (chronic obstructive pulmonary disease) ([address]     CO2 retainer, on Bipap at night for this, Dr. Guerita Oconnor ( last visit 11/20/2020 and note on chart )    Depression      major depressive disorder, ptsd, anxiety    Dysphagia     GI bleed 10/2020    Hypertension     Memory loss     Oxygen dependent     USES  3L/MIN DAILY PRN AND NIGHTLY CONTINUOUSLY    Pain, joint, ankle and foot     Pancreatic lesion 10/2020    Dr. Saad Espitia working up pt    Perianal wart     Peripheral neuropathy     Seizures ([address]     LAST SEIZURE 3/2020 - FEELS IT WAS FROM ALCOHOL WITHDRAWL    Tension headache     Under care of team 09/29/2021    [facility]    Under care of team 09/29/2021    pain management-Hamzah Martines[facility]-last visit sep 2021    Under care of team     pulmonology-Dr Dueñas-[facility]-due for visit March 2022    Under care of team 09/29/2021    psych-bahnfeldt NP-telemed-last visit 10/ 2021    Under care of team 09/29/2021    ab-[address]-last visit aug 2021    Under care of team     NEPHROLOGY - DR. LEE    Wears glasses     Wears partial dentures     UPPER    Wellness examination     pcp-Ludivina grimes-last visit Jan 5, 2022        PAST SURGICAL HISTORY:         Procedure Laterality Date    ANUS SURGERY N/A 01/25/2022    EXCISION AND FULGURATION, ANAL, VAGINAL AND PERIANAL WARTS WITH CO2 LASER, FORTEC performed by Patricia Bergman MD at [address] times 2    COLONOSCOPY N/A 10/22/2020    COLONOSCOPY DIAGNOSTIC performed by Kye Lam MD at [facility]    COLONOSCOPY N/A 11/19/2021    COLONOSCOPY W/ ENDOSCOPIC MUCOSAL RESECTION performed by Kye Lam MD at [facility]    [address]  07/28/2021    CT ABSCESS DRAIN SUBCUTANEOUS 7/28/2021 [facility] CT SCAN    ENDOSCOPIC ULTRASOUND (LOWER) N/A 12/09/2020    ENDOSCOPIC ULTRASOUND, UPPER WITH LINEAR SCOPE FOR BIOPSY OF MASS ON HEAD OF PANCREAS performed by Devin Mancia MD at [facility] (UPPER)  02/09/2022    ENDOSCOPIC ULTRASOUND, EGD - GI SCHEDULED,EGD STENT REMOVAL    ERCP N/A 08/11/2021    ERCP STENT INSERTION performed by Mabel Bynum MD at [facility]    ERCP  08/11/2021    ERCP SPHINCTER/PAPILLOTOMY performed by Mabel Bynum MD at [facility]    ERCP N/A 10/21/2021    ERCP STENT REMOVAL/EXCHANGE performed by Kelli John MD at [facility]    ERCP  10/21/2021    ERCP STONE REMOVAL performed by Kelli John MD at [facility]    ERCP  10/21/2021    ERCP ENDOSCOPIC RETROGRADE CHOLANGIOPANCREATOGRAPHY DIRECT VISUALIZATION performed by Kelli John MD at [address] Left 07/03/2013    ORIF tibial plateau    FRACTURE SURGERY Right     small finger metacarpal fracture    HAND SURGERY      HYSTERECTOMY (CERVIX STATUS UNKNOWN)  2003    SPINE SURGERY N/A 09/10/2021    KYPHOPLASTY lumbar L5 performed by Obi Rockwell MD at [facility] N/A 10/22/2020    EGD BIOPSY performed by Sunday Painting MD at [facility] N/A 04/05/2021    EGD BIOPSY performed by Sunday Painting MD at [facility] 1 N/A 09/08/2021    ENDOSCOPIC ULTRASOUND, LINEAR SCOPE performed by Lucia Mcknight MD at [address] [place] [address] N/A 2/9/2022    ENDOSCOPIC ULTRASOUND, EGD - GI SCHEDULED performed by Kelli John MD at [facility]  2/9/2022    EGD STENT REMOVAL [facility] [address] [facility]     Social History     Socioeconomic History    Marital status: Single     Spouse name: Not on file    Number of children: Not on file    Years of education: Not on file    Highest education level: Not on file   Occupational History    Not on file   Tobacco Use    Smoking status: Every Day     Packs/day: 0.50     Years: 30.00     Pack years: 15.00     Types: Cigarettes    Smokeless tobacco: Never    Tobacco comments:     was smoking 1 ppd   Vaping Use    Vaping Use: Never used   Substance and Sexual Activity    Alcohol use: Not Currently     Alcohol/week: 0.0 standard drinks    Drug use: Yes     Frequency: 2.0 times per week     Comment: recreation    Sexual activity: Not Currently   Other Topics Concern    Not on file   Social History Narrative    Not on file     Social Determinants of Health     Financial Resource Strain: Low Risk     Difficulty of Paying Living Expenses: Not very hard   Food Insecurity: No Food Insecurity    Worried About Running Out of Food in the Last Year: Never true    Ran Out of Food in the Last Year: Never true   Transportation Needs: Not on file   Physical Activity: Not on file   Stress: Not on file   Social Connections: Not on file   Intimate Partner Violence: Not on file   Housing Stability: Not on file       CURRENT MEDICATIONS:     Current Outpatient Medications   Medication Sig Dispense Refill    topiramate (TOPAMAX) 50 MG tablet Take 1 tablet by mouth 2 times daily 60 tablet 5    sucralfate (CARAFATE) 1 GM tablet TAKE ONE TABLET BY MOUTH FOUR TIMES A  tablet 3    busPIRone (BUSPAR) 30 MG tablet Take 1 tablet by mouth in the morning and at bedtime      solifenacin (VESICARE) 10 MG tablet Take 1 tablet by mouth in the morning.       REXULTI 0.5 MG TABS tablet Take 1 tablet by mouth daily      metoclopramide (REGLAN) 5 MG tablet TAKE ONE TABLET BY MOUTH THREE TIMES A DAY 90 tablet 3    omeprazole (PRILOSEC) 40 MG delayed release capsule TAKE ONE CAPSULE BY MOUTH TWICE A DAY 60 capsule 2    rOPINIRole (REQUIP) 1 MG tablet TAKE ONE TABLET BY MOUTH ONCE NIGHTLY 90 tablet 1    carBAMazepine (TEGRETOL) 200 MG tablet TAKE ONE TABLET BY MOUTH THREE TIMES A DAY 90 tablet 5    CREON 10751-61706 units delayed release capsule TAKE ONE CAPSULE BY MOUTH THREE TIMES A DAY WITH MEALS 90 capsule 1    hydrOXYzine (ATARAX) 25 MG tablet Take 1 tablet by mouth 3 times daily as needed for Itching 90 tablet 0    prazosin (MINIPRESS) 1 MG capsule Take 1 capsule by mouth nightly 30 capsule 3    amLODIPine (NORVASC) 10 MG tablet TAKE ONE TABLET BY MOUTH DAILY 90 tablet 1    pregabalin (LYRICA) 200 MG capsule TAKE ONE CAPSULE BY MOUTH THREE TIMES A DAY 90 capsule 5    sodium chloride 1 g tablet Take 1 tablet by mouth 4 times daily Note increase in dose per Dr Renny Fairchild 360 tablet 3    verapamil (CALAN SR) 120 MG extended release tablet Take 1 tablet by mouth daily 90 tablet 1    fluticasone-umeclidin-vilant (TRELEGY ELLIPTA) 100-62.5-25 MCG/INH AEPB Inhale 1 puff into the lungs daily 1 each 5    simethicone (MYLICON) 80 MG chewable tablet Take 1 tablet by mouth 4 times daily as needed for Flatulence 180 tablet 3    albuterol sulfate HFA (VENTOLIN HFA) 108 (90 Base) MCG/ACT inhaler Inhale 2 puffs into the lungs 4 times daily as needed for Wheezing 1 Inhaler 5    vitamin D (ERGOCALCIFEROL) 84141 units CAPS capsule Take 1 capsule by mouth once a week 12 capsule 1    folic acid (FOLVITE) 1 MG tablet Take 1 tablet by mouth daily 90 tablet 1    methylPREDNISolone (MEDROL DOSEPACK) 4 MG tablet Take by mouth. (Patient not taking: Reported on 8/29/2022) 1 kit 0    mirtazapine (REMERON) 7.5 MG tablet Take 1 tablet by mouth nightly as needed (sleep) (Patient not taking: Reported on 8/29/2022) 30 tablet 0    Handicap Placard MISC by Does not apply route Expires 5/2027 1 each 0    nicotine polacrilex (NICOTINE MINI) 4 MG lozenge Take 1 lozenge by mouth as needed for Smoking cessation Weeks 1 to 6: 1 lozenge every 1 to 2 hours. Weeks 7 to 9: 1 lozenge every 2 to 4 hours. Weeks 10 to 12: 1 lozenge every 4 to 8 hours. Maximum 20 lozenges per day. (Patient not taking: Reported on 8/29/2022) 100 each 3    neomycin-polymyxin-dexameth (MAXITROL) 3.5-30141-1.1 ophthalmic suspension  (Patient not taking: Reported on 8/29/2022)      senna (SENOKOT) 8.6 MG tablet Take 1 tablet by mouth 2 times daily (Patient not taking: Reported on 8/29/2022) 60 tablet 11    lidocaine (XYLOCAINE) 5 % ointment Apply topically as needed. (Patient not taking: No sig reported) 30 g 1     No current facility-administered medications for this visit. ALLERGIES:   No Known Allergies                              REVIEW OF SYSTEMS        All items selected indicate a positive finding.     Those items not selected are negative.   Constitutional [] Weight loss/gain   [] Fatigue  [] Fever/Chills   HEENT [] Hearing Loss  [] Visual Disturbance  [] Tinnitus  [] Eye pain   Respiratory [] Shortness of Breath  [] Cough  [] Snoring   Cardiovascular [] Chest Pain  [] Palpitations  [] Lightheaded   GI [] Constipation  [] Diarrhea  [] Swallowing change  [] Nausea/vomiting    [] Urinary Frequency  [] Urinary Urgency   Musculoskeletal [] Neck pain  [x] Back pain  [] Muscle pain  [] Restless legs   Dermatologic [] Skin changes   Neurologic [x] Memory loss/confusion  [] Seizures  [x] Trouble walking or imbalance  [] Dizziness  [] Sleep disturbance  [] Weakness  [x] Numbness  [] Tremors  [] Speech Difficulty  [x] Headaches  [] Light Sensitivity  [] Sound Sensitivity   Endocrinology []Excessive thirst  []Excessive hunger   Psychiatric [] Anxiety/Depression  [] Hallucination   Allergy/immunology []Hives/environmental allergies   Hematologic/lymph [] Abnormal bleeding  [] Abnormal bruising         PHYSICAL EXAMINATION:       Vitals:    08/29/22 1530   BP: 121/88   Pulse: 84                                              .                                                                                                    General Appearance:  Alert, cooperative, no signs of distress, appears stated age   Head:  Normocephalic, no signs of trauma   Eyes:  Conjunctiva/corneas clear;  eyelids intact   Ears:  Normal external ear and canals   Nose: Nares normal, mucosa normal, no drainage    Throat: Lips and tongue normal; teeth normal;  gums normal   Neck: Supple, intact flexion, extension and rotation;   trachea midline;  no adenopathy;   thyroid: not enlarged;   no carotid pulse abnormality   Back:   Symmetric, no curvature, ROM adequate   Lungs:   Respirations unlabored   Heart:  Regular rate and rhythm           Extremities: Extremities normal, no cyanosis, no edema   Pulses: Symmetric over head and neck   Skin: Skin color, texture normal, no rashes, no lesions                                     NEUROLOGIC EXAMINATION    Neurologic Exam  Mental status    Alert and oriented x 3; intact memory with no confusion, speech or language problems; no hallucinations or delusions  Fund of information appropriate for level of education    Cranial nerves    II - visual fields intact to confrontation bilaterally  III, IV, VI - extra-ocular muscles full: no pupillary defect; no CAMMIE, no nystagmus, no ptosis   V - normal facial sensation                                                               VII - normal facial symmetry                                                             VIII - intact hearing                                                                             IX, X - symmetrical palate                                                                  XI - symmetrical shoulder shrug                                                       XII - tongue midline without atrophy or fasciculation      Motor function  Normal muscle bulk and tone; strength 5/5 on all 4 extremities, no pronator drift      Sensory function Intact to light touch, pinprick, vibration, proprioception on all 4 extremities      Cerebellar Intact fine motor movement. No involuntary movements or tremors. No ataxia or dysmetria on finger to nose or heel to shin testing      Reflex function DTR 2+ on bilateral UE and LE, symmetric. Down going toes bilaterally      Gait                   normal base and arm swing                  Medical Decision Making: In summary, your patient, Vinod Borja exhibits the following, with associated plan:    Chronic migraine headaches which have recently improved following adjustment of her psychiatric medications.  Previous medications include gabapentin, prozac, lexapro, magnesium, prozac, Lyrica, carbamazepine  Continue topamax 50 mg twice daily  Continue verapamil 60 mg daily  Continue maxalt for abortive therapy  Seizure disorder with her last seizure occurring in March 2020. The patient was hospitalized after attempting to wean tegretol in August 2021 and was found to have metabolic encephalopathy. Continue tegretol 200 mg three times daily  Continue lyrica 200 mg three times daily  Continue topamax 50 mg twice daily  Obtain carbamazepine level and a CMP  Peripheral neuropathy likely secondary to her history of alcohol abuse  Continue lyrica 200 mg three times daily  Cognitive impairment secondary to her history of alcohol abuse,which recently worsened  Obtain urinalysis with reflex  L4-5 radiculopathy  Continue lyrica 200 mg three times daily  History of an astrocytoma status post two resections in 1989  Return for follow up visit in 3 months              Signed: Tristen Wade CNP      *Please note that portions of this note were completed with a voice recognition program.  Although every effort was made to insure the accuracy of this automated transcription, some errors in transcription may have occurred, occasionally words and are mis-transcribed    Provider Attestation: The documentation recorded by the scribe accurately reflects the service I personally performed and the decisions made by myself. Portions of this note were transcribed by a scribe. I personally performed the history, physical exam, and medical decision-making and confirm the accuracy of the information in the transcribed note. Scribe Attestation:   By signing my name below, Sree , attest that this documentation has been prepared under the direction and in the presence of Tristen Wade CNP.

## 2022-09-01 ENCOUNTER — HOSPITAL ENCOUNTER (OUTPATIENT)
Age: 52
Setting detail: SPECIMEN
Discharge: HOME | End: 2022-09-01

## 2022-09-01 ENCOUNTER — OFFICE VISIT (OUTPATIENT)
Dept: FAMILY MEDICINE CLINIC | Age: 52
End: 2022-09-01
Payer: MEDICARE

## 2022-09-01 VITALS
OXYGEN SATURATION: 95 % | BODY MASS INDEX: 24.4 KG/M2 | TEMPERATURE: 97.7 F | WEIGHT: 146.6 LBS | SYSTOLIC BLOOD PRESSURE: 110 MMHG | DIASTOLIC BLOOD PRESSURE: 78 MMHG | HEART RATE: 87 BPM

## 2022-09-01 DIAGNOSIS — D72.825 BANDEMIA: ICD-10-CM

## 2022-09-01 DIAGNOSIS — E78.00 HYPERCHOLESTEROLEMIA: ICD-10-CM

## 2022-09-01 DIAGNOSIS — F10.21 ALCOHOL USE DISORDER, SEVERE, IN EARLY REMISSION (HCC): ICD-10-CM

## 2022-09-01 DIAGNOSIS — G62.1 ALCOHOLIC PERIPHERAL NEUROPATHY (HCC): ICD-10-CM

## 2022-09-01 DIAGNOSIS — S22.050D COMPRESSION FRACTURE OF T5 VERTEBRA WITH ROUTINE HEALING, SUBSEQUENT ENCOUNTER: Primary | ICD-10-CM

## 2022-09-01 DIAGNOSIS — G43.019 INTRACTABLE MIGRAINE WITHOUT AURA AND WITHOUT STATUS MIGRAINOSUS: ICD-10-CM

## 2022-09-01 DIAGNOSIS — J96.01 ACUTE RESPIRATORY FAILURE WITH HYPOXIA (HCC): ICD-10-CM

## 2022-09-01 DIAGNOSIS — E87.1 HYPONATREMIA: ICD-10-CM

## 2022-09-01 DIAGNOSIS — K86.0 ALCOHOL-INDUCED CHRONIC PANCREATITIS (HCC): ICD-10-CM

## 2022-09-01 DIAGNOSIS — M80.80XD OTHER OSTEOPOROSIS WITH CURRENT PATHOLOGICAL FRACTURE WITH ROUTINE HEALING, SUBSEQUENT ENCOUNTER: ICD-10-CM

## 2022-09-01 PROBLEM — R79.89 ELEVATED BRAIN NATRIURETIC PEPTIDE (BNP) LEVEL: Status: RESOLVED | Noted: 2021-07-22 | Resolved: 2022-09-01

## 2022-09-01 LAB
ABSOLUTE EOS #: <0.03 K/UL (ref 0–0.44)
ABSOLUTE IMMATURE GRANULOCYTE: <0.03 K/UL (ref 0–0.3)
ABSOLUTE LYMPH #: 1.81 K/UL (ref 1.1–3.7)
ABSOLUTE MONO #: 0.9 K/UL (ref 0.1–1.2)
ALBUMIN SERPL-MCNC: 4.1 G/DL (ref 3.5–5.2)
ALBUMIN/GLOBULIN RATIO: 1.3 (ref 1–2.5)
ALP BLD-CCNC: 237 U/L (ref 35–104)
ALT SERPL-CCNC: 8 U/L (ref 5–33)
AMORPHOUS: ABNORMAL
ANION GAP SERPL CALCULATED.3IONS-SCNC: 9 MMOL/L (ref 9–17)
AST SERPL-CCNC: 17 U/L
BACTERIA: ABNORMAL
BASOPHILS # BLD: 0 % (ref 0–2)
BASOPHILS ABSOLUTE: <0.03 K/UL (ref 0–0.2)
BILIRUB SERPL-MCNC: 0.2 MG/DL (ref 0.3–1.2)
BILIRUBIN URINE: ABNORMAL
BUN BLDV-MCNC: 9 MG/DL (ref 6–20)
CALCIUM SERPL-MCNC: 9.2 MG/DL (ref 8.6–10.4)
CARBAMAZEPINE LEVEL: 4.1 UG/ML (ref 4–12)
CASTS UA: ABNORMAL /LPF (ref 0–2)
CASTS UA: ABNORMAL /LPF (ref 0–2)
CHLORIDE BLD-SCNC: 100 MMOL/L (ref 98–107)
CHOLESTEROL/HDL RATIO: 4.3
CHOLESTEROL: 264 MG/DL
CO2: 27 MMOL/L (ref 20–31)
COLOR: ABNORMAL
CREAT SERPL-MCNC: 0.44 MG/DL (ref 0.5–0.9)
CRYSTALS, UA: ABNORMAL /HPF
CRYSTALS, UA: ABNORMAL /HPF
EOSINOPHILS RELATIVE PERCENT: 0 % (ref 1–4)
EPITHELIAL CELLS UA: ABNORMAL /HPF (ref 0–5)
GFR AFRICAN AMERICAN: >60 ML/MIN
GFR NON-AFRICAN AMERICAN: >60 ML/MIN
GFR SERPL CREATININE-BSD FRML MDRD: ABNORMAL ML/MIN/{1.73_M2}
GLUCOSE BLD-MCNC: 107 MG/DL (ref 70–99)
GLUCOSE URINE: NEGATIVE
HCT VFR BLD CALC: 41 % (ref 36.3–47.1)
HDLC SERPL-MCNC: 62 MG/DL
HEMOGLOBIN: 13.4 G/DL (ref 11.9–15.1)
IMMATURE GRANULOCYTES: 0 %
KETONES, URINE: ABNORMAL
LDL CHOLESTEROL: 176 MG/DL (ref 0–130)
LEUKOCYTE ESTERASE, URINE: ABNORMAL
LYMPHOCYTES # BLD: 23 % (ref 24–43)
MCH RBC QN AUTO: 29.6 PG (ref 25.2–33.5)
MCHC RBC AUTO-ENTMCNC: 32.7 G/DL (ref 28.4–34.8)
MCV RBC AUTO: 90.7 FL (ref 82.6–102.9)
MONOCYTES # BLD: 11 % (ref 3–12)
MUCUS: ABNORMAL
NITRITE, URINE: NEGATIVE
NRBC AUTOMATED: 0 PER 100 WBC
PDW BLD-RTO: 18 % (ref 11.8–14.4)
PH UA: 6.5 (ref 5–8)
PLATELET # BLD: 484 K/UL (ref 138–453)
PMV BLD AUTO: 10.5 FL (ref 8.1–13.5)
POTASSIUM SERPL-SCNC: 4.2 MMOL/L (ref 3.7–5.3)
PROTEIN UA: ABNORMAL
RBC # BLD: 4.52 M/UL (ref 3.95–5.11)
RBC # BLD: ABNORMAL 10*6/UL
RBC UA: ABNORMAL /HPF (ref 0–2)
SEG NEUTROPHILS: 66 % (ref 36–65)
SEGMENTED NEUTROPHILS ABSOLUTE COUNT: 5.17 K/UL (ref 1.5–8.1)
SODIUM BLD-SCNC: 136 MMOL/L (ref 135–144)
SPECIFIC GRAVITY UA: 1.03 (ref 1–1.03)
TOTAL PROTEIN: 7.3 G/DL (ref 6.4–8.3)
TRIGL SERPL-MCNC: 130 MG/DL
TURBIDITY: ABNORMAL
URINE HGB: NEGATIVE
UROBILINOGEN, URINE: NORMAL
WBC # BLD: 7.9 K/UL (ref 3.5–11.3)
WBC UA: ABNORMAL /HPF (ref 0–5)

## 2022-09-01 PROCEDURE — 4004F PT TOBACCO SCREEN RCVD TLK: CPT | Performed by: INTERNAL MEDICINE

## 2022-09-01 PROCEDURE — G8427 DOCREV CUR MEDS BY ELIG CLIN: HCPCS | Performed by: INTERNAL MEDICINE

## 2022-09-01 PROCEDURE — 3017F COLORECTAL CA SCREEN DOC REV: CPT | Performed by: INTERNAL MEDICINE

## 2022-09-01 PROCEDURE — 99214 OFFICE O/P EST MOD 30 MIN: CPT | Performed by: INTERNAL MEDICINE

## 2022-09-01 PROCEDURE — G8420 CALC BMI NORM PARAMETERS: HCPCS | Performed by: INTERNAL MEDICINE

## 2022-09-01 RX ORDER — BREXPIPRAZOLE 1 MG/1
TABLET ORAL
COMMUNITY
Start: 2022-08-09

## 2022-09-01 RX ORDER — MIRTAZAPINE 15 MG/1
TABLET, FILM COATED ORAL
COMMUNITY
Start: 2022-07-21

## 2022-09-01 RX ORDER — DENOSUMAB 60 MG/ML
60 INJECTION SUBCUTANEOUS ONCE
Qty: 1 ML | Refills: 0 | Status: SHIPPED | OUTPATIENT
Start: 2022-09-01 | End: 2022-10-26 | Stop reason: SDUPTHER

## 2022-09-01 NOTE — PROGRESS NOTES
[facility] PHYSICIANS  [facility] [address]  [address] [facility] [address]  Dept: [phone]  Dept Fax: [phone]      Marnie Hines is a 47 y.o. female who presents today for hermedical conditions/complaints as noted below. Marnie Hines is c/o of Hypertension and Discuss Medications        Assessment/Plan:     1. Compression fracture of T5 vertebra with routine healing, subsequent encounter  -     denosumab (PROLIA) 60 MG/ML SOSY SC injection; Inject 1 mL into the skin once for 1 dose, Disp-1 mL, R-0Normal  2. Other osteoporosis with current pathological fracture with routine healing, subsequent encounter  -     denosumab (PROLIA) 60 MG/ML SOSY SC injection; Inject 1 mL into the skin once for 1 dose, Disp-1 mL, R-0Normal  3. Hyponatremia  4. Hypercholesterolemia  -     Lipid Panel; Future  5. Acute respiratory failure with hypoxia (HCC)  6. Alcohol use disorder, severe, in early remission ([address]  7. Alcoholic peripheral neuropathy ([address]  8. Alcohol-induced chronic pancreatitis (HCC)  9. Bandemia  -     CBC with Auto Differential; Future        No follow-ups on file. HPI     Here for follow-up today. Feeling good for most part, complaints. She would like to get back on Prolia-originally prescribed by pain management but was told insurance would not pay for it. She remains on daily calcium and vitamin D supplementation. Following with psychiatry and neurology, no changes to medications. Breathing is stable-using albuterol as needed, currently only when she gets sick. Remains on oxygen at night. Remains on Trelegy. Continues to smoke, currently at about half a pack a day. Declines help with smoking cessation.         BP Readings from Last 3 Encounters:   09/01/22 110/78   08/29/22 121/88   08/09/22 116/76              Past Medical History:   Diagnosis Date    Acute respiratory failure with hypoxia ([address] 10/16/2020    Alcohol withdrawal syndrome, with delirium ([address] 12/14/2019    Alcoholism ([facility]     Anal warts     Anemia 10/2020    GI bleed    Anxiety     Astrocytoma ([facility] - diagnosed at age 24, the patient underwent 2 surgical resections without known recurrence 10/23/2020    at age 24    Closed fracture of lateral portion of left tibial plateau 95/43/0367    Condyloma     COPD (chronic obstructive pulmonary disease) ([facility]     CO2 retainer, on Bipap at night for this, Dr. Fadia Morgan ( last visit 11/20/2020 and note on chart )    Depression      major depressive disorder, ptsd, anxiety    Dysphagia     GI bleed 10/2020    Hypertension     Memory loss     Oxygen dependent     USES  3L/MIN DAILY PRN AND NIGHTLY CONTINUOUSLY    Pain, joint, ankle and foot     Pancreatic lesion 10/2020    Dr. Gillian Cortes working up pt    Perianal wart     Peripheral neuropathy     Seizures ([facility]     LAST SEIZURE 3/2020 - FEELS IT WAS FROM ALCOHOL WITHDRAWL    Tension headache     Under care of team 09/29/2021    neuro-Dr Coyle-[facility]-last visit sep 2021    Under care of team 09/29/2021    pain management-Hamzah Martines-nery jimenez-last visit sep 2021    Under care of team     pulmonology-Dr Dueñas-[facility]-due for visit March 2022    Under care of team 09/29/2021    psych-bahnfeldt NP-telemed-last visit 10/ 2021    Under care of team 09/29/2021    wo-[address]-last visit aug 2021    Under care of team     NEPHROLOGY - DR. LEE    Wears glasses     Wears partial dentures     UPPER    Wellness examination     pcp-Ludivina grimes-last visit Jan 5, 2022      Past Surgical History:   Procedure Laterality Date    ANUS SURGERY N/A 01/25/2022    EXCISION AND FULGURATION, ANAL, VAGINAL AND PERIANAL WARTS WITH CO2 LASER, FORTEC performed by Mandi Yadav MD at [facility]    COLONOSCOPY N/A 10/22/2020    COLONOSCOPY DIAGNOSTIC performed by Oswald Rivera MD at [facility]    COLONOSCOPY N/A 11/19/2021    COLONOSCOPY W/ ENDOSCOPIC MUCOSAL RESECTION performed by Crystal Sethi MD at [facility]    [address]  07/28/2021    CT ABSCESS DRAIN SUBCUTANEOUS 7/28/2021 [facility] CT SCAN    ENDOSCOPIC ULTRASOUND (LOWER) N/A 12/09/2020    ENDOSCOPIC ULTRASOUND, UPPER WITH LINEAR SCOPE FOR BIOPSY OF MASS ON HEAD OF PANCREAS performed by Mer Juáerz MD at [facility] (UPPER)  02/09/2022    ENDOSCOPIC ULTRASOUND, EGD - GI SCHEDULED,EGD STENT REMOVAL    ERCP N/A 08/11/2021    ERCP STENT INSERTION performed by Yaw Crain MD at [facility]    ERCP  08/11/2021    ERCP SPHINCTER/PAPILLOTOMY performed by Yaw Crain MD at [facility]    ERCP N/A 10/21/2021    ERCP STENT REMOVAL/EXCHANGE performed by Yaw Crain MD at [place] [address]    ERCP  10/21/2021    ERCP STONE REMOVAL performed by Yaw Crain MD at [place] [address]    ERCP  10/21/2021    ERCP ENDOSCOPIC RETROGRADE CHOLANGIOPANCREATOGRAPHY DIRECT VISUALIZATION performed by Yaw Crain MD at [address] [place] [address] 07/03/2013    ORIF tibial plateau    FRACTURE SURGERY Right     small finger metacarpal fracture    HAND SURGERY      HYSTERECTOMY (CERVIX STATUS UNKNOWN)  2003    SPINE SURGERY N/A 09/10/2021    KYPHOPLASTY lumbar L5 performed by Jason Washington MD at [address] [place] [address] 10/22/2020    EGD BIOPSY performed by Crystal Sethi MD at [address] [place] [address] 04/05/2021    EGD BIOPSY performed by Crystal Sethi MD at [address] [place] [address] N/A 09/08/2021    ENDOSCOPIC ULTRASOUND, LINEAR SCOPE performed by Mer Juárez MD at [address] [place] [address] N/A 2/9/2022    ENDOSCOPIC ULTRASOUND, EGD - GI SCHEDULED performed by Yaw Crain MD at [facility].  2/9/2022    EGD STENT REMOVAL performed by Yaw Crain MD at [place] History   Problem Relation Age of Onset    Other Father Cancer Maternal Grandmother     Heart Disease Paternal Grandmother     Esophageal Cancer Maternal Aunt     Arthritis Mother        Social History     Tobacco Use    Smoking status: Every Day     Packs/day: 0.50     Years: 30.00     Pack years: 15.00     Types: Cigarettes    Smokeless tobacco: Never    Tobacco comments:     was smoking 1 ppd   Substance Use Topics    Alcohol use: Not Currently     Alcohol/week: 0.0 standard drinks        No Known Allergies  Prior to Visit Medications    Medication Sig Taking? Authorizing Provider   mirtazapine (REMERON) 15 MG tablet  Yes Elizabethyeimy Dey   REXULTI 1 MG TABS tablet  Yes Elizabeth Dey   topiramate (TOPAMAX) 50 MG tablet Take 1 tablet by mouth 2 times daily Yes LOI Hazel CNP   sucralfate (CARAFATE) 1 GM tablet TAKE ONE TABLET BY MOUTH FOUR TIMES A DAY Yes Florin Kilgore MD   busPIRone (BUSPAR) 30 MG tablet Take 1 tablet by mouth in the morning and at bedtime Yes Elizabeth Dey   solifenacin (VESICARE) 10 MG tablet Take 1 tablet by mouth in the morning. Yes Xiomara MASTERS MD   methylPREDNISolone (MEDROL DOSEPACK) 4 MG tablet Take by mouth.  Yes Aracelis Gil MD   metoclopramide (REGLAN) 5 MG tablet TAKE ONE TABLET BY MOUTH THREE TIMES A DAY Yes Florin Kilgore MD   omeprazole (PRILOSEC) 40 MG delayed release capsule TAKE ONE CAPSULE BY MOUTH TWICE A DAY Yes Florin Kilgore MD   rOPINIRole (REQUIP) 1 MG tablet TAKE ONE TABLET BY MOUTH ONCE NIGHTLY Yes LOI Nolan NP   carBAMazepine (TEGRETOL) 200 MG tablet TAKE ONE TABLET BY MOUTH THREE TIMES A DAY Yes LOI Nolan NP   CREON 38883-40512 units delayed release capsule TAKE ONE CAPSULE BY MOUTH THREE TIMES A DAY WITH MEALS Yes LOI Nolan NP   hydrOXYzine (ATARAX) 25 MG tablet Take 1 tablet by mouth 3 times daily as needed for Itching Yes Aracelis Gil MD   Handicap Placard MISC by Does not apply route Expires 5/2027 Yes Aracelis Gil MD   prazosin (MINIPRESS) 1 MG capsule Take 1 capsule by mouth nightly Yes LOI Booth NP   amLODIPine (NORVASC) 10 MG tablet TAKE ONE TABLET BY MOUTH DAILY Yes LOI Booth - NP   pregabalin (LYRICA) 200 MG capsule TAKE ONE CAPSULE BY MOUTH THREE TIMES A DAY Yes LOI Casanova CNP   nicotine polacrilex (NICOTINE MINI) 4 MG lozenge Take 1 lozenge by mouth as needed for Smoking cessation Weeks 1 to 6: 1 lozenge every 1 to 2 hours. Weeks 7 to 9: 1 lozenge every 2 to 4 hours. Weeks 10 to 12: 1 lozenge every 4 to 8 hours. Maximum 20 lozenges per day. Yes LOI Booth NP   sodium chloride 1 g tablet Take 1 tablet by mouth 4 times daily Note increase in dose per Dr Cinthya Sweeney MD   verapamil (CALAN SR) 120 MG extended release tablet Take 1 tablet by mouth daily Yes Trever Avendano MD   senna (SENOKOT) 8.6 MG tablet Take 1 tablet by mouth 2 times daily Yes Virginia Alcantara, DO   lidocaine (XYLOCAINE) 5 % ointment Apply topically as needed. Yes Virginia Alcantara, DO   fluticasone-umeclidin-vilant (TRELEGY ELLIPTA) 100-62.5-25 MCG/INH AEPB Inhale 1 puff into the lungs daily Yes Ludivina Loredo MD   simethicone (MYLICON) 80 MG chewable tablet Take 1 tablet by mouth 4 times daily as needed for Flatulence Yes Victor Hugo Soto MD   albuterol sulfate HFA (VENTOLIN HFA) 108 (90 Base) MCG/ACT inhaler Inhale 2 puffs into the lungs 4 times daily as needed for Wheezing Yes Ludivina Loredo MD   vitamin D (ERGOCALCIFEROL) 29560 units CAPS capsule Take 1 capsule by mouth once a week Yes Ludivina Loredo MD   folic acid (FOLVITE) 1 MG tablet Take 1 tablet by mouth daily Yes Cynthia Richmond MD       Review of Systems     Review of Systems   Constitutional:  Negative for fatigue, fever and unexpected weight change. Respiratory:  Negative for cough, choking, chest tightness, shortness of breath and wheezing. Cardiovascular:  Negative for chest pain, palpitations and leg swelling.    Gastrointestinal: Negative for abdominal pain, anal bleeding, blood in stool, constipation, diarrhea, nausea and vomiting. Endocrine: Negative. Musculoskeletal:  Negative for joint swelling and myalgias. Skin: Negative. Neurological:  Negative for dizziness. Psychiatric/Behavioral:  Negative for sleep disturbance. All other systems reviewed and are negative. Objective     /78   Pulse 87   Temp 97.7 °F (36.5 °C) (Temporal)   Wt 146 lb 9.6 oz (66.5 kg)   SpO2 95%   BMI 24.40 kg/m²   Physical Exam  Vitals and nursing note reviewed. Constitutional:       General: She is not in acute distress. Appearance: She is well-developed. She is not ill-appearing. Eyes:      General: Lids are normal. Vision grossly intact. Cardiovascular:      Rate and Rhythm: Normal rate and regular rhythm. Heart sounds: Normal heart sounds, S1 normal and S2 normal. No murmur heard. No friction rub. No gallop. Pulmonary:      Effort: Pulmonary effort is normal. No respiratory distress. Breath sounds: Normal breath sounds. No wheezing. Abdominal:      General: Bowel sounds are normal.      Palpations: Abdomen is soft. There is no mass. Tenderness: There is no abdominal tenderness. There is no guarding. Musculoskeletal:         General: Normal range of motion. Skin:     General: Skin is warm and dry. Capillary Refill: Capillary refill takes less than 2 seconds. Neurological:      General: No focal deficit present. Mental Status: She is alert and oriented to person, place, and time. Data Review       There are no preventive care reminders to display for this patient. Patient given educational materials- see patient instructions. Discussed use, benefit, and side effects of prescribedmedications. All patient questions answered. Pt voiced understanding. Reviewedhealth maintenance. Instructed to continue current medications, diet and exercise. Patient agreed with treatment plan. Follow up as directed.      Electronically signedby Beni Rogers MD on 9/1/2022

## 2022-09-01 NOTE — PROGRESS NOTES
Pt is here for a 4 month follow up. She would like to try for Prolia again or take something like it.   She would like to know if still has to take sodium Chlor  She would like some labs to check hormones and BMP

## 2022-09-02 ENCOUNTER — TELEPHONE (OUTPATIENT)
Dept: NEUROLOGY | Age: 52
End: 2022-09-02

## 2022-09-02 NOTE — TELEPHONE ENCOUNTER
Elena Rubio called in. She was just in to see Jefe ALEJO and reported she had not had any migraines. Well unfortunately she has had migriane for 2 days. She is going to see if she has any Maxalt at home. She wondered if Jefe would want her to start on Ajovy again and if so she will need a RX  Her number is [phone].

## 2022-09-03 LAB
CULTURE: ABNORMAL
CULTURE: ABNORMAL
SPECIMEN DESCRIPTION: ABNORMAL

## 2022-09-06 DIAGNOSIS — N39.0 UTI (URINARY TRACT INFECTION), UNCOMPLICATED: Primary | ICD-10-CM

## 2022-09-06 RX ORDER — AMOXICILLIN 875 MG/1
875 TABLET, COATED ORAL 2 TIMES DAILY
Qty: 20 TABLET | Refills: 0 | Status: SHIPPED | OUTPATIENT
Start: 2022-09-06 | End: 2022-09-16

## 2022-09-06 RX ORDER — ATORVASTATIN CALCIUM 20 MG/1
20 TABLET, FILM COATED ORAL DAILY
Qty: 30 TABLET | Refills: 3 | Status: SHIPPED | OUTPATIENT
Start: 2022-09-06

## 2022-09-06 ASSESSMENT — VISUAL ACUITY: OU: 1

## 2022-09-06 ASSESSMENT — ENCOUNTER SYMPTOMS
ABDOMINAL PAIN: 0
VOMITING: 0
NAUSEA: 0
CHEST TIGHTNESS: 0
BLOOD IN STOOL: 0
WHEEZING: 0
SHORTNESS OF BREATH: 0
COUGH: 0
ANAL BLEEDING: 0
CONSTIPATION: 0
DIARRHEA: 0
CHOKING: 0

## 2022-09-07 ENCOUNTER — HOSPITAL ENCOUNTER (OUTPATIENT)
Dept: PSYCHIATRY | Age: 52
Setting detail: THERAPIES SERIES
Discharge: HOME | End: 2022-09-07

## 2022-09-08 DIAGNOSIS — K86.0 ALCOHOL-INDUCED CHRONIC PANCREATITIS (HCC): ICD-10-CM

## 2022-09-08 NOTE — TELEPHONE ENCOUNTER
Once 09/20/2022               Patient Active Problem List:     Acute bronchitis     Reflex sympathetic dystrophy of lower limb     Essential hypertension     Nicotine addiction     Psychophysiological insomnia     Anxiety     Alcoholic peripheral neuropathy (HCC)     Hypokalemia     Macrocytic anemia     Hypomagnesemia     Tobacco use     Ambulatory dysfunction     Seizure disorder (HCC)     COPD with acute exacerbation (HCC)     Paresthesia of lower extremity     Acute respiratory failure with hypoxia (HCC)     Pancreatic cyst     Recurrent major depressive disorder (HCC)     Elevated CA 19-9 level     Perineal mass, female     Esophagitis candidal      Condyloma     Astrocytoma (HCC) - diagnosed at age 24, the patient underwent 2 surgical resections without known recurrence     Alcohol use disorder, severe, in early remission ([address]     Metabolic encephalopathy     AMAN (generalized anxiety disorder)     Major depressive disorder, recurrent severe without psychotic features ([address]     Esophageal dysphagia     Chronic peripheral neuropathic pain     History of alcohol abuse     History of petit-mal seizures     Intractable episodic tension-type headache     Personal history of astrocytoma     Multilevel spine pain     Chronic pain syndrome     Marijuana abuse     Severe sepsis (HCC)     Closed fracture of fifth thoracic vertebra (HCC)     Diverticulitis of large intestine with abscess without bleeding     Elevated alkaline phosphatase level     Chronic pancreatitis (HCC)     Erythema ab igne     Compression fracture of thoracic vertebra (HCC)     Pathological compression fracture of lumbar vertebra with delayed healing     Metabolic acidosis with increased anion gap and accumulation of organic acids     Community acquired pneumonia of left lower lobe of lung     Septicemia ([address]     Alcohol-induced acute pancreatitis     Fluid collection of pancreas     Diverticulitis of large intestine without perforation or abscess with bleeding     Pseudocyst of pancreas     Sepsis ([facility]     Acute recurrent pancreatitis     Atelectasis of left lung     Hyponatremia     Iron deficiency anemia     Adenomatous polyp of sigmoid colon     Moderate episode of recurrent major depressive disorder (HCC)     Sleep disturbance     Intractable migraine without aura and without status migrainosus

## 2022-09-12 RX ORDER — PANCRELIPASE 24000; 76000; 120000 [USP'U]/1; [USP'U]/1; [USP'U]/1
CAPSULE, DELAYED RELEASE PELLETS ORAL
Qty: 90 CAPSULE | Refills: 2 | Status: SHIPPED | OUTPATIENT
Start: 2022-09-12

## 2022-09-14 ENCOUNTER — HOSPITAL ENCOUNTER (OUTPATIENT)
Dept: PSYCHIATRY | Age: 52
Setting detail: THERAPIES SERIES
Discharge: HOME | End: 2022-09-14
Payer: MEDICARE

## 2022-09-14 PROCEDURE — 90837 PSYTX W PT 60 MINUTES: CPT | Performed by: SOCIAL WORKER
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
## 2022-09-16 ENCOUNTER — SCHEDULED TELEPHONE ENCOUNTER (OUTPATIENT)
Dept: GASTROENTEROLOGY | Age: 52
End: 2022-09-16
Payer: MEDICARE

## 2022-09-16 DIAGNOSIS — K63.5 POLYP OF COLON, UNSPECIFIED PART OF COLON, UNSPECIFIED TYPE: Primary | ICD-10-CM

## 2022-09-16 PROCEDURE — 99212 OFFICE O/P EST SF 10 MIN: CPT | Performed by: INTERNAL MEDICINE

## 2022-09-16 ASSESSMENT — ENCOUNTER SYMPTOMS
CONSTIPATION: 1
ALLERGIC/IMMUNOLOGIC NEGATIVE: 1
BLOOD IN STOOL: 0
ABDOMINAL DISTENTION: 1
EYES NEGATIVE: 1
TROUBLE SWALLOWING: 1
RECTAL PAIN: 0
VOMITING: 0
CHOKING: 1
ABDOMINAL PAIN: 1

## 2022-09-19 ENCOUNTER — SCHEDULED TELEPHONE ENCOUNTER (OUTPATIENT)
Dept: GASTROENTEROLOGY | Age: 52
End: 2022-09-19
Payer: MEDICARE

## 2022-09-19 DIAGNOSIS — Z86.010 HISTORY OF COLON POLYPS: Primary | ICD-10-CM

## 2022-09-19 DIAGNOSIS — R13.19 ESOPHAGEAL DYSPHAGIA: ICD-10-CM

## 2022-09-19 PROCEDURE — 99212 OFFICE O/P EST SF 10 MIN: CPT | Performed by: INTERNAL MEDICINE

## 2022-09-19 RX ORDER — OMEPRAZOLE 40 MG/1
40 CAPSULE, DELAYED RELEASE ORAL 2 TIMES DAILY
Qty: 60 CAPSULE | Refills: 2 | Status: SHIPPED | OUTPATIENT
Start: 2022-09-19

## 2022-09-19 RX ORDER — SUCRALFATE 1 G/1
1 TABLET ORAL 4 TIMES DAILY
Qty: 120 TABLET | Refills: 3 | Status: SHIPPED | OUTPATIENT
Start: 2022-09-19

## 2022-09-19 RX ORDER — HYDROXYZINE 50 MG/1
TABLET, FILM COATED ORAL
COMMUNITY
Start: 2022-09-13

## 2022-09-19 RX ORDER — VARENICLINE TARTRATE 1 MG/1
TABLET, FILM COATED ORAL
COMMUNITY
Start: 2022-09-17 | End: 2022-10-03

## 2022-09-19 NOTE — PROGRESS NOTES
VIRTUAL VISIT:      Jackie Resendez is a 47 y.o. female evaluated via telephone on 9/19/2022. Consent:  She and/or health care decision maker is aware that that she may receive a bill for this telephone service, depending on her insurance coverage, and has provided verbal consent to proceed: No - Not billable    Agree with ROS as documented and detailed by MA/LPN in separate note from today's encounter     Documentation:  I communicated with the patient and/or health care decision maker about her GI issues. Details of this discussion including any medical advice provided:     Refill medications for GERD  Smoking cessation recommended   Colonoscopy for post-EMR surveillance  Follow up with CR surgery for Condyloma      I affirm this is a Patient Initiated Episode with an Established Patient who has not had a related appointment within my department in the past 7 days or scheduled within the next 24 hours. Total Time: minutes: 11-20 minutes    Jackie Resendez is a 47 y.o. female being evaluated by a Virtual Visit (telephone visit) encounter to address concerns as mentioned above. A caregiver was present when appropriate. Due to this being a TeleHealth encounter (During FRQGW-64 public health emergency), evaluation of the following organ systems was limited: Vitals/Constitutional/EENT/Resp/CV/GI//MS/Neuro/Skin/Heme-Lymph-Imm. Pursuant to the emergency declaration under the [facility], [address] [place] [address] authority and the [employer] and [employer] Act, this Virtual Visit was conducted with patient's (and/or legal guardian's) consent, to reduce the patient's risk of exposure to COVID-19 and provide necessary medical care. The patient (and/or legal guardian) has also been advised to contact this office for worsening conditions or problems, and seek emergency medical treatment and/or call 911 if deemed necessary.      Services were provided through a telephone synchronous discussion virtually to substitute for in-person clinic visit. Patient and provider were located at their individual homes. --Nette Aguilar MA on 9/19/2022 at 12:37 PM    An electronic signature was used to authenticate this note.        Av Bazzi   Gastroenterology

## 2022-09-21 ENCOUNTER — HOSPITAL ENCOUNTER (OUTPATIENT)
Dept: PSYCHIATRY | Age: 52
Setting detail: THERAPIES SERIES
Discharge: HOME | End: 2022-09-21
Payer: MEDICARE

## 2022-09-21 PROCEDURE — 90837 PSYTX W PT 60 MINUTES: CPT | Performed by: SOCIAL WORKER

## 2022-09-22 NOTE — PROGRESS NOTES
TELEHEALTH EVALUATION -- Audio/Visual (During LDRMX-37 public health emergency)     [facility] Therapy Note  MARVIN Wright   9/21/22  2:00 PM  Joseph Sandoval  1970  [id number]    Patient location is at their home address. Location of clinician is at [address] [place] [address] located at [address] [place] [address]. Time spent with Patient: 60 minutes      Pt was provided informed consent for the [facility]. Discussed with patient model of service to include the limits of confidentiality (i.e. abuse reporting, suicide intervention, etc.) and short-term intervention focused approach. Pt indicated understanding. S:  Pt presented as angry and agitated and identified that she didn't want to attend therapy today. Therapist praised pt for coming even though she didn't want to and gave pt space to express her thoughts and emotions. Therapist and pt used cognitive techniques to explore the source of pt's anger. Therapist and pt discussed pt's money situation including recent purchases and decrease in food stamp amount. Therapist and pt discussed relaxation and regulation skills, self-care, problem solving, and effective communication.   Therapist and pt to explore pt's worries about money with EMDR.     O:  MSE:     Appearance    alert, cooperative, mild distress  Appetite normal  Sleep disturbance Yes  Fatigue Yes  Loss of pleasure Yes  Impulsive behavior No  Speech    normal rate, normal volume, and well articulated  Mood    Angry  Irritability  Affect    agitation  Thought Content    hopelessness, excessive preoccupations, cognitive distortions, and all or nothing thinking  Thought Process    linear, goal directed, coherent, and slow  Associations    logical connections  Insight    Good  Judgment    Intact  Orientation    oriented to person, place, time, and general circumstances  Memory    recent and remote memory intact  Attention/Concentration    intact  Morbid Peripheral neuropathy     Seizures ([facility]     LAST SEIZURE 3/2020 - FEELS IT WAS FROM ALCOHOL WITHDRAWL    Tension headache     Under care of team 09/29/2021    neuro-Dr Coyle-[facility]-last visit sep 2021    Under care of team 09/29/2021    pain management-Hamzah Martines-[facility]-last visit sep 2021    Under care of team     pulmonology-Dr Dueñas-[facility]-due for visit March 2022    Under care of team 09/29/2021    psych-bahnfeldt NP-telemed-last visit 10/ 2021    Under care of team 09/29/2021    qb-[address]-last visit aug 2021    Under care of team     NEPHROLOGY - DR. LEE    Wears glasses     Wears partial dentures     UPPER    Wellness examination     [facility] Jan 5, 2022     Medications:   Current Outpatient Medications   Medication Sig Dispense Refill    hydrOXYzine HCl (ATARAX) 50 MG tablet       varenicline (CHANTIX) 1 MG tablet       omeprazole (PRILOSEC) 40 MG delayed release capsule Take 1 capsule by mouth in the morning and at bedtime 60 capsule 2    sucralfate (CARAFATE) 1 GM tablet Take 1 tablet by mouth 4 times daily 120 tablet 3    lipase-protease-amylase (CREON) 53074-01196 units delayed release capsule TAKE ONE CAPSULE BY MOUTH THREE TIMES A DAY WITH MEALS 90 capsule 2    atorvastatin (LIPITOR) 20 MG tablet Take 1 tablet by mouth daily 30 tablet 3    mirtazapine (REMERON) 15 MG tablet       REXULTI 1 MG TABS tablet       denosumab (PROLIA) 60 MG/ML SOSY SC injection Inject 1 mL into the skin once for 1 dose 1 mL 0    topiramate (TOPAMAX) 50 MG tablet Take 1 tablet by mouth 2 times daily 60 tablet 5    busPIRone (BUSPAR) 30 MG tablet Take 1 tablet by mouth in the morning and at bedtime      solifenacin (VESICARE) 10 MG tablet Take 1 tablet by mouth in the morning. methylPREDNISolone (MEDROL DOSEPACK) 4 MG tablet Take by mouth.  1 kit 0    metoclopramide (REGLAN) 5 MG tablet TAKE ONE TABLET BY MOUTH THREE TIMES A DAY 90 tablet 3 rOPINIRole (REQUIP) 1 MG tablet TAKE ONE TABLET BY MOUTH ONCE NIGHTLY 90 tablet 1    carBAMazepine (TEGRETOL) 200 MG tablet TAKE ONE TABLET BY MOUTH THREE TIMES A DAY 90 tablet 5    Handicap Placard MISC by Does not apply route Expires 5/2027 1 each 0    prazosin (MINIPRESS) 1 MG capsule Take 1 capsule by mouth nightly 30 capsule 3    amLODIPine (NORVASC) 10 MG tablet TAKE ONE TABLET BY MOUTH DAILY 90 tablet 1    pregabalin (LYRICA) 200 MG capsule TAKE ONE CAPSULE BY MOUTH THREE TIMES A DAY 90 capsule 5    nicotine polacrilex (NICOTINE MINI) 4 MG lozenge Take 1 lozenge by mouth as needed for Smoking cessation Weeks 1 to 6: 1 lozenge every 1 to 2 hours. Weeks 7 to 9: 1 lozenge every 2 to 4 hours. Weeks 10 to 12: 1 lozenge every 4 to 8 hours. Maximum 20 lozenges per day. 100 each 3    sodium chloride 1 g tablet Take 1 tablet by mouth 4 times daily Note increase in dose per Dr Herminia Hoyos 360 tablet 3    verapamil (CALAN SR) 120 MG extended release tablet Take 1 tablet by mouth daily 90 tablet 1    senna (SENOKOT) 8.6 MG tablet Take 1 tablet by mouth 2 times daily 60 tablet 11    lidocaine (XYLOCAINE) 5 % ointment Apply topically as needed. (Patient not taking: Reported on 9/19/2022) 30 g 1    fluticasone-umeclidin-vilant (TRELEGY ELLIPTA) 100-62.5-25 MCG/INH AEPB Inhale 1 puff into the lungs daily 1 each 5    simethicone (MYLICON) 80 MG chewable tablet Take 1 tablet by mouth 4 times daily as needed for Flatulence 180 tablet 3    albuterol sulfate HFA (VENTOLIN HFA) 108 (90 Base) MCG/ACT inhaler Inhale 2 puffs into the lungs 4 times daily as needed for Wheezing 1 Inhaler 5    vitamin D (ERGOCALCIFEROL) 15355 units CAPS capsule Take 1 capsule by mouth once a week 12 capsule 1    folic acid (FOLVITE) 1 MG tablet Take 1 tablet by mouth daily 90 tablet 1     No current facility-administered medications for this encounter.        Social History:   Social History     Socioeconomic History    Marital status: Single     Spouse Therapy and Prolonged Exposure), Established rapport, Conducted functional assessment, Supportive techniques, Cognitive strategies to target distortions including catastrophizing, and Identified maladaptive thoughts        PLAN:   PCL-5  EMDR: Money-with headphones       Patient scheduled to return on: Wednesday 9/28/2022 at 2 PM    Were changes or additions made to the treatment plan today? YES []   NO [x]  Noted changes:                Pursuant to the emergency declaration under the [address] [place] [address], [facility] waiver authority and the [employer] and [employer] Act, this Virtual Visit was conducted, with patient's consent, to reduce the patient's risk of exposure to COVID-19 and provide continuity of care for an established patient. Services were provided through a video synchronous discussion virtually to substitute for in-person clinic visit.

## 2022-09-26 RX ORDER — VARENICLINE TARTRATE 1 MG/1
TABLET, FILM COATED ORAL
Qty: 60 TABLET | OUTPATIENT
Start: 2022-09-26

## 2022-09-28 ENCOUNTER — HOSPITAL ENCOUNTER (OUTPATIENT)
Dept: PSYCHIATRY | Age: 52
Setting detail: THERAPIES SERIES
Discharge: HOME | End: 2022-09-28
Payer: MEDICARE

## 2022-09-28 PROCEDURE — 90837 PSYTX W PT 60 MINUTES: CPT | Performed by: SOCIAL WORKER
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
## 2022-09-28 NOTE — PROGRESS NOTES
[facility] Therapy Note in [facility], [address]   9/28/2022  2:00 PM  Zena Bobbi  1970  [id number]    Time spent with Patient: 60 minutes      Pt was provided informed consent [facility] the [facility]. Discussed with patient model of service to include the limits of confidentiality (i.e. abuse reporting, suicide intervention, etc.) and short-term intervention focused approach. Pt indicated understanding. S:  Pt and therapist engaged in check in and therapist offered emotional support as they processed pt's recent mood. Pt completed an updated PCL-5 and pt and therapist processed the results. Therapist ant pt explored and problem solved recent event of pt forgetting about a drs appt for the third time. Therapist and pt then processed this event in EMDR. EMDR Treatment     Negative Belief: I'm Bad  Positive Belief:  It's Okay  Target: Forgetting dr's appt for the 3rd time   Present  Initial VOC: 4  Emotions: Cold  Initial SARAH: 5  Body Location: Shoulders, breathing    Outcome:  Unfinished  Ending SARAH: 2  Ending VOC: 5  Clear Body Scan? N/A  Closure: Container   Client Stability: Good    Other Notes:    Need to explore getting mom's approval.     O:  MSE:     Appearance    alert, cooperative, mild distress  Appetite normal  Sleep disturbance Yes  Fatigue Yes  Loss of pleasure Yes  Impulsive behavior No  Speech    normal rate, normal volume, and well articulated  Mood    Anxious  Demoralization  Affect    anxiety  Thought Content    excessive guilt, cognitive distortions, and all or nothing thinking  Thought Process    linear, goal directed, and coherent  Associations    logical connections  Insight    Good  Judgment    Intact  Orientation    oriented to person, place, time, and general circumstances  Memory    recent and remote memory intact  Attention/Concentration    intact  Morbid ideation No  Suicide Assessment    no suicidal ideation    A:  Pt's PCL5 score was 47. FROM ALCOHOL WITHDRAWL    Tension headache     Under care of team 09/29/2021    neuro-Dr Coyle-[facility] ct-last visit sep 2021    Under care of team 09/29/2021    pain management-Hamzah Martines-[facility] jim[facility]-last visit sep 2021    Under care of team     pulmonology-Dr Dueñas-[facility]-due for visit March 2022    Under care of team 09/29/2021    psych-bahnfeldt NP-telemed-last visit 10/ 2021    Under care of team 09/29/2021    fa-[facility]-last visit aug 2021    Under care of team     NEPHROLOGY - DR. LEE    Wears glasses     Wears partial dentures     UPPER    Wellness examination     pcp-Ludivina Grimm-[facility]-last visit Jan 5, 2022     Medications:   Current Outpatient Medications   Medication Sig Dispense Refill    hydrOXYzine HCl (ATARAX) 50 MG tablet       varenicline (CHANTIX) 1 MG tablet       omeprazole (PRILOSEC) 40 MG delayed release capsule Take 1 capsule by mouth in the morning and at bedtime 60 capsule 2    sucralfate (CARAFATE) 1 GM tablet Take 1 tablet by mouth 4 times daily 120 tablet 3    lipase-protease-amylase (CREON) 95012-40888 units delayed release capsule TAKE ONE CAPSULE BY MOUTH THREE TIMES A DAY WITH MEALS 90 capsule 2    atorvastatin (LIPITOR) 20 MG tablet Take 1 tablet by mouth daily 30 tablet 3    mirtazapine (REMERON) 15 MG tablet       REXULTI 1 MG TABS tablet       denosumab (PROLIA) 60 MG/ML SOSY SC injection Inject 1 mL into the skin once for 1 dose 1 mL 0    topiramate (TOPAMAX) 50 MG tablet Take 1 tablet by mouth 2 times daily 60 tablet 5    busPIRone (BUSPAR) 30 MG tablet Take 1 tablet by mouth in the morning and at bedtime      solifenacin (VESICARE) 10 MG tablet Take 1 tablet by mouth in the morning. methylPREDNISolone (MEDROL DOSEPACK) 4 MG tablet Take by mouth.  1 kit 0    metoclopramide (REGLAN) 5 MG tablet TAKE ONE TABLET BY MOUTH THREE TIMES A DAY 90 tablet 3    rOPINIRole (REQUIP) 1 MG tablet TAKE ONE TABLET BY MOUTH ONCE NIGHTLY 90 tablet 1    carBAMazepine (TEGRETOL) 200 MG tablet TAKE ONE TABLET BY MOUTH THREE TIMES A DAY 90 tablet 5    Handicap Placard MISC by Does not apply route Expires 5/2027 1 each 0    prazosin (MINIPRESS) 1 MG capsule Take 1 capsule by mouth nightly 30 capsule 3    amLODIPine (NORVASC) 10 MG tablet TAKE ONE TABLET BY MOUTH DAILY 90 tablet 1    pregabalin (LYRICA) 200 MG capsule TAKE ONE CAPSULE BY MOUTH THREE TIMES A DAY 90 capsule 5    nicotine polacrilex (NICOTINE MINI) 4 MG lozenge Take 1 lozenge by mouth as needed for Smoking cessation Weeks 1 to 6: 1 lozenge every 1 to 2 hours. Weeks 7 to 9: 1 lozenge every 2 to 4 hours. Weeks 10 to 12: 1 lozenge every 4 to 8 hours. Maximum 20 lozenges per day. 100 each 3    sodium chloride 1 g tablet Take 1 tablet by mouth 4 times daily Note increase in dose per Dr Eulalio Hooker 360 tablet 3    verapamil (CALAN SR) 120 MG extended release tablet Take 1 tablet by mouth daily 90 tablet 1    senna (SENOKOT) 8.6 MG tablet Take 1 tablet by mouth 2 times daily 60 tablet 11    lidocaine (XYLOCAINE) 5 % ointment Apply topically as needed. (Patient not taking: Reported on 9/19/2022) 30 g 1    fluticasone-umeclidin-vilant (TRELEGY ELLIPTA) 100-62.5-25 MCG/INH AEPB Inhale 1 puff into the lungs daily 1 each 5    simethicone (MYLICON) 80 MG chewable tablet Take 1 tablet by mouth 4 times daily as needed for Flatulence 180 tablet 3    albuterol sulfate HFA (VENTOLIN HFA) 108 (90 Base) MCG/ACT inhaler Inhale 2 puffs into the lungs 4 times daily as needed for Wheezing 1 Inhaler 5    vitamin D (ERGOCALCIFEROL) 99392 units CAPS capsule Take 1 capsule by mouth once a week 12 capsule 1    folic acid (FOLVITE) 1 MG tablet Take 1 tablet by mouth daily 90 tablet 1     No current facility-administered medications for this encounter.        Social History:   Social History     Socioeconomic History    Marital status: Single     Spouse name: Not on file    Number of children: Not on file    Years of education: Not on file    Highest education level: Not on file   Occupational History    Not on file   Tobacco Use    Smoking status: Every Day     Packs/day: 0.50     Years: 30.00     Pack years: 15.00     Types: Cigarettes    Smokeless tobacco: Never    Tobacco comments:     was smoking 1 ppd   Vaping Use    Vaping Use: Never used   Substance and Sexual Activity    Alcohol use: Not Currently     Alcohol/week: 0.0 standard drinks    Drug use: Yes     Frequency: 2.0 times per week     Comment: recreation    Sexual activity: Not Currently   Other Topics Concern    Not on file   Social History Narrative    Not on file     Social Determinants of Health     Financial Resource Strain: Low Risk     Difficulty of Paying Living Expenses: Not very hard   Food Insecurity: No Food Insecurity    Worried About Running Out of Food in the Last Year: Never true    Ran Out of Food in the Last Year: Never true   Transportation Needs: Not on file   Physical Activity: Not on file   Stress: Not on file   Social Connections: Not on file   Intimate Partner Violence: Not on file   Housing Stability: Not on file       TOBACCO:   reports that she has been smoking cigarettes. She has a 15.00 pack-year smoking history. She has never used smokeless tobacco.  ETOH:   reports that she does not currently use alcohol. Family History:   Family History   Problem Relation Age of Onset    Other Father     Cancer Maternal Grandmother     Heart Disease Paternal Grandmother     Esophageal Cancer Maternal Aunt     Arthritis Mother            Pt interventions:  Trained in relaxation strategies, Provided education, Provided education on PTSD symptoms and treatment options for evidence-based treatment (Cognitive Processing Therapy and Prolonged Exposure), Established rapport, Supportive techniques, and Identified maladaptive thoughts, EMDR        PLAN:   EMDR  Target Mom's approval      Patient scheduled to return on:    Wednesday 10/5/22 at 2 PM    Were changes or additions made to the treatment plan today?   YES []   NO [x]  Noted changes:

## 2022-09-29 ENCOUNTER — TELEPHONE (OUTPATIENT)
Dept: NEUROLOGY | Age: 52
End: 2022-09-29

## 2022-09-29 NOTE — TELEPHONE ENCOUNTER
Patient called the office this morning stating that she is on an extensive list of medications. Patient states that she has been having side effects since starting the Topamax and she would like to stop the medication. She states that her mother read over the list of side effects for the Topamax and she has most of them. She gave examples of \"constant rolling eyes, they don't stay straight, like nystagmus, eyes are blurry most of the time, wobbly gait, loss for words, bottom lip is cold and tingles and hands and feet tingle\". When Mikaela Kraft gave the list of symptoms she was having she started to cry. She went on to say that she is on so much that she has to eliminate some things to find out why she is having these problems. Patient informed that I would send this information to [facility]. Please advise.

## 2022-09-30 NOTE — TELEPHONE ENCOUNTER
Is it okay for the patient to just stop the Topamax or would you like her to wean down? Please advise.

## 2022-10-03 RX ORDER — VARENICLINE TARTRATE 1 MG/1
TABLET, FILM COATED ORAL
Qty: 60 TABLET | Refills: 5 | Status: SHIPPED | OUTPATIENT
Start: 2022-10-03

## 2022-10-03 NOTE — TELEPHONE ENCOUNTER
Last visit: 09/01/2022  Last Med refill: previous physician   Does patient have enough medication for 72 hours: No:     Next Visit Date:  Future Appointments   Date Time Provider Lexi Castillo   10/5/2022  2:00 PM MARVIN Good [facility]   10/10/2022 10:30 AM SCHEDULE, [facility] RESPIRATORY SPEC Resp Spec [facility]   10/12/2022  2:00 PM Brendan Sánchez, [address] Maintenance   Topic Date Due    Cervical cancer screen  Never done    Flu vaccine (1) 08/01/2022    Pneumococcal 0-64 years Vaccine (2 - PCV) 11/04/2022    Depression Monitoring  04/04/2023    Lipids  09/01/2023    Breast cancer screen  04/08/2024    DTaP/Tdap/Td vaccine (2 - Td or Tdap) 12/28/2030    Colorectal Cancer Screen  11/19/2031    Shingles vaccine  Completed    COVID-19 Vaccine  Completed    Hepatitis C screen  Completed    HIV screen  Completed    Hepatitis A vaccine  Aged Out    Hepatitis B vaccine  Aged Out    Hib vaccine  Aged Out    Meningococcal (ACWY) vaccine  Aged Out       Hemoglobin A1C (%)   Date Value   04/13/2021 5.5   07/14/2019 4.3             ( goal A1C is < 7)   No results found for: LABMICR  LDL Cholesterol (mg/dL)   Date Value   09/01/2022 176 (H)   12/23/2020 134 (H)       (goal LDL is <100)   AST (U/L)   Date Value   09/01/2022 17     ALT (U/L)   Date Value   09/01/2022 8     BUN (mg/dL)   Date Value   09/01/2022 9     BP Readings from Last 3 Encounters:   09/01/22 110/78   08/29/22 121/88   08/09/22 116/76          (goal 120/80)    All Future Testing planned in [facility]  Lab Frequency Next Occurrence   OT functional capacity evaluation (FCE) Once 12/14/2021   EGD Once 02/04/2022   Urinalysis with Reflex to Culture Once 09/20/2022   COLONOSCOPY (Diagnostic) Once 11/07/2022               Patient Active Problem List:     Acute bronchitis     Reflex sympathetic dystrophy of lower limb     Essential hypertension     Nicotine addiction     Psychophysiological insomnia     Anxiety Alcoholic peripheral neuropathy (HCC)     Hypokalemia     Macrocytic anemia     Hypomagnesemia     Tobacco use     Ambulatory dysfunction     Seizure disorder (HCC)     COPD with acute exacerbation (HCC)     Paresthesia of lower extremity     Acute respiratory failure with hypoxia (HCC)     Pancreatic cyst     Recurrent major depressive disorder (HCC)     Elevated CA 19-9 level     Perineal mass, female     Esophagitis candidal      Condyloma     Astrocytoma (HCC) - diagnosed 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24, the patient underwent 2 surgical resections without known recurrence     Alcohol use disorder, severe, in early remission ([address]     Metabolic encephalopathy     AMAN (generalized anxiety disorder)     Major depressive disorder, recurrent severe without psychotic features ([address]     Esophageal dysphagia     Chronic peripheral neuropathic pain     History of alcohol abuse     History of petit-mal seizures     Intractable episodic tension-type headache     Personal history of astrocytoma     Multilevel spine pain     Chronic pain syndrome     Marijuana abuse     Severe sepsis (HCC)     Closed fracture of fifth thoracic vertebra (HCC)     Diverticulitis of large intestine with abscess without bleeding     Elevated alkaline phosphatase level     Chronic pancreatitis (HCC)     Erythema ab igne     Compression fracture of thoracic vertebra (HCC)     Pathological compression fracture of lumbar vertebra with delayed healing     Metabolic acidosis with increased anion gap and accumulation of organic acids     Community acquired pneumonia of left lower lobe of lung     Septicemia ([address]     Alcohol-induced acute pancreatitis     Fluid collection of pancreas     Diverticulitis of large intestine without perforation or abscess with bleeding     Pseudocyst of pancreas     Sepsis ([address]     Acute recurrent pancreatitis     Atelectasis of left lung     Hyponatremia     Iron deficiency anemia     Adenomatous polyp of sigmoid colon     Moderate episode of recurrent major depressive disorder (HCC)     Sleep disturbance     Intractable migraine without aura and without status migrainosus

## 2022-10-05 ENCOUNTER — HOSPITAL ENCOUNTER (OUTPATIENT)
Dept: PSYCHIATRY | Age: 52
Setting detail: THERAPIES SERIES
Discharge: HOME | End: 2022-10-05
Payer: MEDICARE

## 2022-10-05 PROCEDURE — 90837 PSYTX W PT 60 MINUTES: CPT | Performed by: SOCIAL WORKER
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
## 2022-10-13 DIAGNOSIS — G44.211 INTRACTABLE EPISODIC TENSION-TYPE HEADACHE: ICD-10-CM

## 2022-10-13 NOTE — TELEPHONE ENCOUNTER
Previous note states 60mg otherwise noted 120mg. Pharmacy requesting refill of Verapamil ER 120mg. Medication active on med list yes      Date of last fill: 03/17/2022    verified on 10/13/2022     verified by [address] [place] [address]      Date of last appointment 08/29/2022    Next Visit Date:   To return in 3 months (ASAP 2023)

## 2022-10-16 DIAGNOSIS — I10 ESSENTIAL HYPERTENSION: ICD-10-CM

## 2022-10-17 RX ORDER — AMLODIPINE BESYLATE 10 MG/1
TABLET ORAL
Qty: 90 TABLET | Refills: 1 | Status: SHIPPED | OUTPATIENT
Start: 2022-10-17

## 2022-10-17 NOTE — TELEPHONE ENCOUNTER
LAST VISIT:   9/1/2022     Future Appointments   Date Time Provider Lexi Castillo   10/19/2022 11:00 [facility]   10/19/2022  2:00 PM Estephania Chan, [facility]   10/26/2022  2:00 PM Estephania Chan, [facility]   11/1/2022  9:00 AM SCHEDULE, [facility] RESPIRATORY SPEC Resp Spec [facility]   11/2/2022  2:00 PM Estephania Chan, [facility]   11/9/2022  2:00 PM Estephania Chan, [facility]   11/16/2022  2:00 PM Estephania Chan, [facility]   11/23/2022  2:00 PM Estephania Chan, [facility]   11/30/2022  2:00 PM Estephania Chan, [facility]   12/7/2022  2:00 PM Estephania Chan, [facility]   12/14/2022  2:00 PM Estephania Chan, [facility]   12/21/2022  2:00 PM Estephania Chan, [facility]   12/28/2022  2:00 PM Estephania Chan, [facility]   1/4/2023  2:00 PM Estephania Chan, [facility]   1/11/2023  2:00 PM Estephania Chan, [facility]   1/18/2023  2:00 PM Estephania Chan, [facility]   1/25/2023  2:00 PM Estephania Chan, [address] [place] [address]

## 2022-10-19 ENCOUNTER — HOSPITAL ENCOUNTER (OUTPATIENT)
Dept: PSYCHIATRY | Age: 52
Setting detail: THERAPIES SERIES
Discharge: HOME | End: 2022-10-19

## 2022-10-19 PROCEDURE — 90834 PSYTX W PT 45 MINUTES: CPT | Performed by: SOCIAL WORKER
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
## 2022-10-24 ENCOUNTER — TELEPHONE (OUTPATIENT)
Dept: FAMILY MEDICINE CLINIC | Age: 52
End: 2022-10-24

## 2022-10-24 NOTE — TELEPHONE ENCOUNTER
Pt called stating she would like to stop taking the Chantix if she can, states she is still smoking a half a pack a a day

## 2022-10-25 NOTE — PROGRESS NOTES
Subjective:      Patient ID: Fly Selby is a 47 y.o. female. HPI:  Patient here for follow-up for COPD, chronic respiratory failure, smoking. Patient was last seen in the office in March 2022 per Dr. Ronita Boxer    Patient states that she is doing well, work of 44 years, currently working her way down on cigarettes and is smoking 1/2 pack/day. She continues on Requip for restless leg at night and Trelegy Ellipta for her underlying COPD. She remains on oxygen at 3 L/min around-the-clock and was requalified today with a 6-minute walk test, she dropped down to 87% on room air and needed 3 L to maintain an SPO2 greater than 90%. Patient is receptive to receiving her flu vaccine today. Planning to obtain her COVID omicron booster. Up-to-date on pneumococcal and Tdap. Advised patient to quit and offered support. Educational material provided to patient. Discussed prior reason for relapse and strategies to overcome this in the future. Smoking cessation counseling provided. Individualized cessation plan includes finding alternative methods to addressing her anxiety. Patient states that she does smoke for anxiety relief. Suggested physical activity, going for a walk. Medications:   Requip 1 mg nightly  Trelegy Ellipta  Albuterol HFA: Using every 3 weeks  Oxygen 3 LPM     PRIOR WORKUP:  PFT:  6-minute walk test 11/1/2022: Patient was on room air and dropped down to 87% with ambulation. Required supplemental oxygen to maintain an SPO2 of greater than 90% with 3 L. PFT 11/1/2022: FVC 2.10 L, 65% of predicted. FEV1 1.52 L, 58% of predicted. FEV1/FVC ratio 72, 89% of predicted. FEF 25-75 is 1.08, 39% of predicted. RV is 2.20, 118% of predicted. TLC is 3.65, 71% of predicted. DSB is 5.26, 26% of predicted. Final impression: Pending physician interpretation. CT Imaging:  CT chest 7/21/2021: Negative for pulmonary embolism. GGO in the lingula.   Cysts stable small loculated pneumatocele and new cystic bronchiectasis on the right. CT chest 5/24/2021: No mediastinal adenopathy. Ill-defined areas of GGO in the upper lobes. Bibasilar scarring. No focal consolidation, pneumothorax or pleural effusion. Septated cyst superior segment of the right lower lobe and similar finding in the superior segment of the left lower lobe. No suspicious pulmonary nodule. CT chest 12/15/2018: Negative for pulmonary embolism. No mediastinal adenopathy. 8 x 14 mm groundglass nodule in the anterior right lower lobe. No other pulmonary nodule evident. No focal consolidation or pulmonary edema. No pleural effusion or pneumothorax. Few patchy thin-walled air cyst bilateral lungs     Sleep Study:  PSG 5/29/2021: Patient had 3 obstructive apneas and 2 hypopneas for an AHI for the entire night of 1.0. AHI was 1.2 during NREM sleep compared to an AHI of 0.0 during REM sleep. Minimum SPO2 was 75%. Patient spent a total of 285 minutes with an SPO2 of less than 89%. Patient had 45 leg movements during the night for a PLM index of 9.4 with 0 associated arousals.      Laboratory Evaluation:       Immunizations:   Immunization History   Administered Date(s) Administered    COVID-19, PFIZER GRAY top, DO NOT Dilute, (age 15 y+), IM, 30 mcg/0.3 mL 06/09/2022    COVID-19, PFIZER PURPLE top, DILUTE for use, (age 15 y+), 30mcg/0.3mL 03/26/2021, 04/23/2021, 11/04/2021    Influenza Vaccine, unspecified formulation 10/18/2018    Influenza Virus Vaccine 11/01/2017, 10/18/2018, 09/10/2019, 10/01/2020    Influenza, AFLURIA (age 1 yrs+), FLUZONE, (age 10 mo+), MDV, 0.5mL 09/10/2019    Influenza, FLUCELVAX, (age 10 mo+), MDCK, PF, 0.5mL 10/04/2021    MMR 09/27/2006    Pneumococcal Polysaccharide ([id number]) 12/28/2020, 11/04/2021    Tdap (Boostrix, Adacel) 12/28/2020    Zoster Recombinant (Shingrix) 11/04/2021, 02/24/2022, 06/29/2022        Sleep Medicine 3/22/2022 5/20/2021 2/26/2021 11/20/2020   Sitting and reading 0 3 0 1 Watching TV 1 3 0 1   Sitting, inactive in a public place (e.g. a theatre or a meeting) 2 0 0 0   As a passenger in a car for an hour without a break 1 2 0 1   Lying down to rest in the afternoon when circumstances permit 1 1 0 2   Sitting and talking to someone 1 0 0 0   Sitting quietly after a lunch without alcohol 1 1 0 1   In a car, while stopped for a few minutes in traffic 2 0 0 0   [place] Sleepiness Score 9 10 0 6       /84 (Site: Left Upper Arm)   Pulse 95   Resp 16   Ht 5' 6\" (1.676 m)   Wt 152 lb (68.9 kg)   SpO2 91% Comment: ra  BMI 24.53 kg/m²     Past Medical History:   Diagnosis Date    Acute respiratory failure with hypoxia ([address] 10/16/2020    Alcohol withdrawal syndrome, with delirium ([address] 12/14/2019    Alcoholism ([address]     Anal warts     Anemia 10/2020    GI bleed    Anxiety     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24, the patient underwent 2 surgical resections without known recurrence 10/23/2020    at age 24    Bandemia     Closed fracture of lateral portion of left tibial plateau 26/66/7951    Condyloma     COPD (chronic obstructive pulmonary disease) ([address]     CO2 retainer, on Bipap at night for this, Dr. Ras Orozco ( last visit 11/20/2020 and note on chart )    Depression      major depressive disorder, ptsd, anxiety    Dysphagia     GI bleed 10/2020    Hypertension     Memory loss     Oxygen dependent     USES  3L/MIN DAILY PRN AND NIGHTLY CONTINUOUSLY    Pain, joint, ankle and foot     Pancreatic lesion 10/2020    Dr. Beverly Aly working up pt    Perianal wart     Peripheral neuropathy     Seizures ([address]     LAST SEIZURE 3/2020 - FEELS IT WAS FROM ALCOHOL WITHDRAWL    Tension headache     Under care of team 09/29/2021    neuro-Dr Coyle-[facility] ct-last visit sep 2021    Under care of team 09/29/2021    pain management-Hamzah Martines-nery jimenez-last visit sep 2021    Under care of team     pulmonology-Dr Due[facility]as[facility]-due for visit March 2022    Under care of team 09/29/2021    psych-bahnfeldt NP-telemed-last visit 10/ 2021    Under care of team 09/29/2021    rp-[address]-last visit aug 2021    Under care of team     NEPHROLOGY - DR. LEE    Wears glasses     Wears partial dentures     UPPER    Wellness examination     pcp-Ludivina Grimm-Mario grimes-last visit Jan 5, 2022     Past Surgical History:   Procedure Laterality Date    ANUS SURGERY N/A 01/25/2022    EXCISION AND FULGURATION, ANAL, VAGINAL AND PERIANAL WARTS WITH CO2 LASER, FORTEC performed by Blanca Longoria MD at [address] times 2    COLONOSCOPY N/A 10/22/2020    COLONOSCOPY DIAGNOSTIC performed by Flora Riley MD at [facility]    COLONOSCOPY N/A 11/19/2021    COLONOSCOPY W/ ENDOSCOPIC MUCOSAL RESECTION performed by Flora Riley MD at [facility]    [address]  07/28/2021    CT ABSCESS DRAIN SUBCUTANEOUS 7/28/2021 [facility] CT SCAN    ENDOSCOPIC ULTRASOUND (LOWER) N/A 12/09/2020    ENDOSCOPIC ULTRASOUND, UPPER WITH LINEAR SCOPE FOR BIOPSY OF MASS ON HEAD OF PANCREAS performed by Javier Cordero MD at [facility] (UPPER)  02/09/2022    ENDOSCOPIC ULTRASOUND, EGD - GI SCHEDULED,EGD STENT REMOVAL    ERCP N/A 08/11/2021    ERCP STENT INSERTION performed by Maame Juarez MD at [facility]    ERCP  08/11/2021    ERCP SPHINCTER/PAPILLOTOMY performed by Maame Juarez MD at [facility]    ERCP N/A 10/21/2021    ERCP STENT REMOVAL/EXCHANGE [facility]    ERCP  10/21/2021    ERCP STONE REMOVAL [facility] [address]    ERCP  10/21/2021    ERCP ENDOSCOPIC RETROGRADE CHOLANGIOPANCREATOGRAPHY DIRECT VISUALIZATION performed by Maame Juarez MD at [address] Left 07/03/2013    ORIF tibial plateau    FRACTURE SURGERY Right     small finger metacarpal fracture    HAND SURGERY      HYSTERECTOMY (CERVIX STATUS UNKNOWN)  2003    SPINE SURGERY N/A 09/10/2021 KYPHOPLASTY lumbar L5 performed by Ketan Ferrari MD at [address] [place] [address] N/A 10/22/2020    EGD BIOPSY performed by Gordy Garcia MD at [facility] 1 N/A 04/05/2021    EGD BIOPSY performed by Gordy Garcia MD at [facility] 1 N/A 09/08/2021    ENDOSCOPIC ULTRASOUND, LINEAR SCOPE performed by Ramonita Ma MD at [address] [place] [address] N/A 2/9/2022    ENDOSCOPIC ULTRASOUND, EGD - GI SCHEDULED performed by Mark Godinez MD at [address] [place] [address]  2/9/2022    EGD STENT REMOVAL performed by Mark Godinez MD at [address] [place] [address] History   Problem Relation Age of Onset    Other Father     Cancer Maternal Grandmother     Heart Disease Paternal Grandmother     Esophageal Cancer Maternal Aunt     Arthritis Mother        Social History     Socioeconomic History    Marital status: Single     Spouse name: Not on file    Number of children: Not on file    Years of education: Not on file    Highest education level: Not on file   Occupational History    Not on file   Tobacco Use    Smoking status: Every Day     Packs/day: 0.50     Years: 30.00     Pack years: 15.00     Types: Cigarettes    Smokeless tobacco: Never    Tobacco comments:     was smoking 1 ppd   Vaping Use    Vaping Use: Never used   Substance and Sexual Activity    Alcohol use: Not Currently     Alcohol/week: 0.0 standard drinks    Drug use: Yes     Frequency: 2.0 times per week     Comment: recreation    Sexual activity: Not Currently   Other Topics Concern    Not on file   Social History Narrative    Not on file     Social Determinants of Health     Financial Resource Strain: Low Risk     Difficulty of Paying Living Expenses: Not very hard   Food Insecurity: No Food Insecurity    Worried About Running Out of Food in the Last Year: Never true    Ran Out of Food in the Last Year: Never true   Transportation Needs: Not on file   Physical Activity: Not on file   Stress: Not on file   Social Connections: Not on file   Intimate Partner Violence: Not on file   Housing Stability: Not on file       Review of Systems   Constitutional:         Decreased activity tolerance secondary to exertional dyspnea. HENT:          Denies sinus pain/pressure. Denies rhinorrhea. Occasional cough   Eyes: Negative. Respiratory:          Exertional dyspnea. No significant cough. Denies sputum production-specifically purulent or hemoptysis   Cardiovascular: Negative. Gastrointestinal: Negative. Endocrine: Negative. Genitourinary: Negative. Musculoskeletal: Negative. Skin: Negative. Allergic/Immunologic: Negative. Neurological: Negative. Hematological: Negative. Psychiatric/Behavioral: Negative. Objective:       Physical Exam  General appearance - alert, well appearing, and in no distress, oriented to person, place, and time, and overweight  Mental status - alert, oriented to person, place, and time, normal mood, behavior, speech, dress, motor activity, and thought processes  Eyes - pupils equal and reactive, extraocular eye movements intact  Ears -not examined  Nose - normal and patent, no erythema, discharge or polyps  Mouth - mucous membranes moist, pharynx normal without lesions  Neck - supple, no significant adenopathy  Chest - Increased AP diameter, decreased thoracic expansion and excursion, prolonged expiratory phase. Lung sounds are generally diminished throughout. No adventitious lung sounds appreciated. No witnessed cough.   Heart -normal rate, regular rhythm, normal S1, S2, no murmurs, rubs, clicks or gallops  Abdomen - soft, nontender, nondistended, no masses or organomegaly  Neuro- alert, oriented, normal speech, no focal findings or movement disorder noted}  Extremities - peripheral pulses normal, no pedal edema, no clubbing or cyanosis  Skin - normal coloration and turgor, no rashes, no suspicious skin lesions noted     Wt Readings from Last 3 Encounters:   11/01/22 152 lb (68.9 kg)   09/01/22 146 lb 9.6 oz (66.5 kg)   08/29/22 152 lb (68.9 kg)       Results for orders placed or performed during the hospital encounter of 09/01/22   Culture, Urine    Specimen: Urine   Result Value Ref Range    Specimen Description . URINE     Culture (A)      AEROCOCCUS VIRIDANS There are no CLSI interpretive guidelines for routine susceptibility testing. Aerococcus species are reported to be susceptible to penicillin. >975432 CFU/ML    Culture (A)      AEROCOCCUS URINAE There are no CLSI interpretive guidelines for routine susceptibility testing. Aerococcus species are reported to be susceptible to penicillin. A. urinae has also been described as susceptible to amoxicillin and nitrofurantoin (for treatment of urinary tract infections only).  >472709 CFU/ML     CBC with Auto Differential   Result Value Ref Range    WBC 7.9 3.5 - 11.3 k/uL    RBC 4.52 3.95 - 5.11 m/uL    Hemoglobin 13.4 11.9 - 15.1 g/dL    Hematocrit 41.0 36.3 - 47.1 %    MCV 90.7 82.6 - 102.9 fL    MCH 29.6 25.2 - 33.5 pg    MCHC 32.7 28.4 - 34.8 g/dL    RDW 18.0 (H) 11.8 - 14.4 %    Platelets 877 (H) 010 - 453 k/uL    MPV 10.5 8.1 - 13.5 fL    NRBC Automated 0.0 0.0 per 100 WBC    Seg Neutrophils 66 (H) 36 - 65 %    Lymphocytes 23 (L) 24 - 43 %    Monocytes 11 3 - 12 %    Eosinophils % 0 (L) 1 - 4 %    Basophils 0 0 - 2 %    Immature Granulocytes 0 0 %    Segs Absolute 5.17 1.50 - 8.10 k/uL    Absolute Lymph # 1.81 1.10 - 3.70 k/uL    Absolute Mono # 0.90 0.10 - 1.20 k/uL    Absolute Eos # <0.03 0.00 - 0.44 k/uL    Basophils Absolute <0.03 0.00 - 0.20 k/uL    Absolute Immature Granulocyte <0.03 0.00 - 0.30 k/uL    RBC Morphology ANISOCYTOSIS PRESENT    Lipid Panel   Result Value Ref Range    Cholesterol 264 (H) <200 mg/dL    HDL 62 >40 mg/dL    LDL Cholesterol 176 (H) 0 - 130 mg/dL    Chol/HDL Ratio 4.3 <5    Triglycerides 130 <150 mg/dL Carbamazepine Level, Total   Result Value Ref Range    Carbamazepine Lvl 4.1 4.0 - 12.0 ug/mL   Urinalysis with Reflex to Culture    Specimen: Urine, clean catch   Result Value Ref Range    Color, UA Dark Yellow (A) Yellow    Turbidity UA Turbid (A) Clear    Glucose, Ur NEGATIVE NEGATIVE    Bilirubin Urine NEGATIVE  Verified by ictotest. (A) NEGATIVE    Ketones, Urine TRACE (A) NEGATIVE    Specific Gravity, UA 1.027 1.005 - 1.030    Urine Hgb NEGATIVE NEGATIVE    pH, UA 6.5 5.0 - 8.0    Protein, UA 2+ (A) NEGATIVE    Urobilinogen, Urine Normal Normal    Nitrite, Urine NEGATIVE NEGATIVE    Leukocyte Esterase, Urine SMALL (A) NEGATIVE   Comprehensive Metabolic Panel   Result Value Ref Range    Glucose 107 (H) 70 - 99 mg/dL    BUN 9 6 - 20 mg/dL    Creatinine 0.44 (L) 0.50 - 0.90 mg/dL    Calcium 9.2 8.6 - 10.4 mg/dL    Sodium 136 135 - 144 mmol/L    Potassium 4.2 3.7 - 5.3 mmol/L    Chloride 100 98 - 107 mmol/L    CO2 27 20 - 31 mmol/L    Anion Gap 9 9 - 17 mmol/L    Alkaline Phosphatase 237 (H) 35 - 104 U/L    ALT 8 5 - 33 U/L    AST 17 <32 U/L    Total Bilirubin 0.2 (L) 0.3 - 1.2 mg/dL    Total Protein 7.3 6.4 - 8.3 g/dL    Albumin 4.1 3.5 - 5.2 g/dL    Albumin/Globulin Ratio 1.3 1.0 - 2.5    GFR Non-African American >60 >60 mL/min    GFR African American >60 >60 mL/min    GFR Comment         Microscopic Urinalysis   Result Value Ref Range    WBC, UA 10 TO 20 0 - 5 /HPF    RBC, UA 0 TO 2 0 - 2 /HPF    Casts UA 2 TO 5 0 - 2 /LPF    Casts UA HYALINE 0 - 2 /LPF    Crystals, UA CALCIUM OXALATE (A) None /HPF    Crystals, UA FEW (A) None /HPF    Epithelial Cells UA 2 TO 5 0 - 5 /HPF    Bacteria, UA MANY (A) None    Mucus, UA 2+ (A) None    Amorphous, UA 2+ (A) None       No results found. Assessment:      1. Peripheral polyneuropathy    2. Former heavy tobacco smoker    3. Need for influenza vaccination    4. Chronic respiratory failure, unspecified whether with hypoxia or hypercapnia ([address]    5. Restless leg    6. Chronic bronchitis, unspecified chronic bronchitis type ([facility]    7. Tobacco use          Plan:      Medications reviewed, patient remains on Trelegy and albuterol HFA. Educated and clarified the medication use. Refills sent to Rx if requested. Recommend flu vaccination in the fall annually. Received today  Patient is up-to-date with pneumococcal vaccinations from pulmonary perspective. Patient has received initial Covid vaccination recommend bivalent booster when eligible. Discussed strategies to protect against Covid 19. Maintain an active lifestyle. Patient's questions were answered to her satisfaction.   Home O2 evaluation to be done - Yes  Supplemental oxygen was continued at 3 LPM around-the-clock    Smoking cessation was recommended/continued  Pulmonary function tests were reviewed per physician  Chest x-ray was reviewed  CT scan of the chest was reviewed  We'll see the patient back in 6 months          Electronically signed by LOI Garcia CNP on 11/1/2022 at 9:59 AM

## 2022-10-26 ENCOUNTER — HOSPITAL ENCOUNTER (OUTPATIENT)
Dept: PSYCHIATRY | Age: 52
Setting detail: THERAPIES SERIES
Discharge: HOME | End: 2022-10-26

## 2022-10-26 ENCOUNTER — NURSE ONLY (OUTPATIENT)
Dept: FAMILY MEDICINE CLINIC | Age: 52
End: 2022-10-26
Payer: MEDICARE

## 2022-10-26 DIAGNOSIS — S22.050D COMPRESSION FRACTURE OF T5 VERTEBRA WITH ROUTINE HEALING, SUBSEQUENT ENCOUNTER: Primary | ICD-10-CM

## 2022-10-26 DIAGNOSIS — M80.80XD OTHER OSTEOPOROSIS WITH CURRENT PATHOLOGICAL FRACTURE WITH ROUTINE HEALING, SUBSEQUENT ENCOUNTER: ICD-10-CM

## 2022-10-26 PROCEDURE — 96372 THER/PROPH/DIAG INJ SC/IM: CPT | Performed by: INTERNAL MEDICINE

## 2022-10-26 NOTE — PROGRESS NOTES
After obtaining consent, and per orders of Dr. Jada Barth, injection of Prolia given in Left arm by Remberto Raza MA. Patient instructed to remain in clinic for 20 minutes afterwards, and to report any adverse reaction to me immediately.

## 2022-11-01 ENCOUNTER — OFFICE VISIT (OUTPATIENT)
Dept: PULMONOLOGY | Age: 52
End: 2022-11-01
Payer: MEDICARE

## 2022-11-01 VITALS
SYSTOLIC BLOOD PRESSURE: 118 MMHG | HEART RATE: 95 BPM | RESPIRATION RATE: 16 BRPM | OXYGEN SATURATION: 91 % | DIASTOLIC BLOOD PRESSURE: 84 MMHG | BODY MASS INDEX: 24.43 KG/M2 | HEIGHT: 66 IN | WEIGHT: 152 LBS

## 2022-11-01 DIAGNOSIS — J96.10 CHRONIC RESPIRATORY FAILURE, UNSPECIFIED WHETHER WITH HYPOXIA OR HYPERCAPNIA (HCC): ICD-10-CM

## 2022-11-01 DIAGNOSIS — G62.9 PERIPHERAL POLYNEUROPATHY: Primary | ICD-10-CM

## 2022-11-01 DIAGNOSIS — G25.81 RESTLESS LEG: ICD-10-CM

## 2022-11-01 DIAGNOSIS — J42 CHRONIC BRONCHITIS, UNSPECIFIED CHRONIC BRONCHITIS TYPE (HCC): ICD-10-CM

## 2022-11-01 DIAGNOSIS — Z23 NEED FOR INFLUENZA VACCINATION: ICD-10-CM

## 2022-11-01 DIAGNOSIS — Z87.891 FORMER HEAVY TOBACCO SMOKER: ICD-10-CM

## 2022-11-01 DIAGNOSIS — Z72.0 TOBACCO USE: ICD-10-CM

## 2022-11-01 PROCEDURE — 3074F SYST BP LT 130 MM HG: CPT | Performed by: NURSE PRACTITIONER

## 2022-11-01 PROCEDURE — 94010 BREATHING CAPACITY TEST: CPT | Performed by: NURSE PRACTITIONER

## 2022-11-01 PROCEDURE — 90471 IMMUNIZATION ADMIN: CPT | Performed by: NURSE PRACTITIONER

## 2022-11-01 PROCEDURE — 99213 OFFICE O/P EST LOW 20 MIN: CPT | Performed by: NURSE PRACTITIONER

## 2022-11-01 PROCEDURE — 3023F SPIROM DOC REV: CPT | Performed by: NURSE PRACTITIONER

## 2022-11-01 PROCEDURE — G8482 FLU IMMUNIZE ORDER/ADMIN: HCPCS | Performed by: NURSE PRACTITIONER

## 2022-11-01 PROCEDURE — 94618 PULMONARY STRESS TESTING: CPT | Performed by: NURSE PRACTITIONER

## 2022-11-01 PROCEDURE — G8428 CUR MEDS NOT DOCUMENT: HCPCS | Performed by: NURSE PRACTITIONER

## 2022-11-01 PROCEDURE — G8420 CALC BMI NORM PARAMETERS: HCPCS | Performed by: NURSE PRACTITIONER

## 2022-11-01 PROCEDURE — 3017F COLORECTAL CA SCREEN DOC REV: CPT | Performed by: NURSE PRACTITIONER

## 2022-11-01 PROCEDURE — 94729 DIFFUSING CAPACITY: CPT | Performed by: NURSE PRACTITIONER

## 2022-11-01 PROCEDURE — 3078F DIAST BP <80 MM HG: CPT | Performed by: NURSE PRACTITIONER

## 2022-11-01 PROCEDURE — 94726 PLETHYSMOGRAPHY LUNG VOLUMES: CPT | Performed by: NURSE PRACTITIONER

## 2022-11-01 PROCEDURE — 90674 CCIIV4 VAC NO PRSV 0.5 ML IM: CPT | Performed by: NURSE PRACTITIONER

## 2022-11-01 PROCEDURE — 4004F PT TOBACCO SCREEN RCVD TLK: CPT | Performed by: NURSE PRACTITIONER

## 2022-11-01 RX ORDER — PREGABALIN 200 MG/1
200 CAPSULE ORAL NIGHTLY
Qty: 90 CAPSULE | Refills: 3 | Status: SHIPPED | OUTPATIENT
Start: 2022-11-01 | End: 2022-12-01

## 2022-11-01 RX ORDER — ALBUTEROL SULFATE 90 UG/1
2 AEROSOL, METERED RESPIRATORY (INHALATION) 4 TIMES DAILY PRN
Qty: 3 EACH | Refills: 3 | Status: SHIPPED | OUTPATIENT
Start: 2022-11-01

## 2022-11-01 ASSESSMENT — ENCOUNTER SYMPTOMS
ALLERGIC/IMMUNOLOGIC NEGATIVE: 1
GASTROINTESTINAL NEGATIVE: 1
EYES NEGATIVE: 1

## 2022-11-01 NOTE — PROGRESS NOTES
[address], [place].  [facility] [address]    Oximetry Report  Testing Date: 11/01/22      Name: Abimael Marcial    Age: 47 y.o. Gender: female   Diagnosis:   1. Chronic obstructive pulmonary disease with acute lower respiratory infection (HCC)                                              Weight: 152                                                   Height: 66 in    Smoke HX:  reports that she has been smoking cigarettes. She has a 15.00 pack-year smoking history. She has never used smokeless tobacco.                                                            Max - Target  Heart Rate 183 / 146  Inspired O2 SP02% Max Heart Rate Assist Device Activity Pace Distance Walked (ft) Time (min.)   R/A 91 95  rest      R/A 87 110  Walk  Normal  200    N/C-2 89 108  Walk  Normal  500    N/C-3 95 112  Walk  Normal  250    N/C          R/A= Roomed Air, NC = Oxygen by Nasal Cannula    Distance Walk Predicted Distance LLN** Predicted % Duration (min) Duration of Stops Sec Winnie Dyspnea Winnie Fatigue             **LLN= Lower limits of normal **LLN= Lower limits of normal  (from [place] [facility] [place] [address] [place] [address] of Respiratory and Critical Care Medicine;[id number])       Comments: This test was to re-qualify patient for oxygen use. Patient is already set up with oxygen.    Patient was 87% with exertion and then placed on 3 pulse dose with a saturation of 97%

## 2022-11-03 ENCOUNTER — TELEPHONE (OUTPATIENT)
Dept: ONCOLOGY | Age: 52
End: 2022-11-03

## 2022-11-03 NOTE — LETTER
11/3/2022        Josse Guzmán  [facility] [address]  [facility] [address]    Dear Josse Guzmán: Your healthcare provider has ordered a low dose CT scan of the chest for lung cancer screening. Enclosed you will find information about CT lung screening and smoking cessation resources. If you are unable to keep you appointment for you CT lung screening, please call our scheduling department at [phone]    Keep in mind that CT lung screening does not take the place of smoking cessation. Please do not hesitate to contact me if you have any questions or concerns.     [address] [facility],      [facility]  [phone]

## 2022-11-09 ENCOUNTER — HOSPITAL ENCOUNTER (OUTPATIENT)
Dept: CT IMAGING | Age: 52
Discharge: HOME | End: 2022-11-11
Payer: MEDICARE

## 2022-11-09 DIAGNOSIS — Z87.891 FORMER HEAVY TOBACCO SMOKER: ICD-10-CM

## 2022-11-09 PROCEDURE — 71271 CT THORAX LUNG CANCER SCR C-: CPT

## 2022-11-10 NOTE — PROGRESS NOTES
Pulmonary function study dictated by Dr. Ulises Fontenot  Patient Asim De La Cruz  MR #[id number]  Date of study 11/1/2022    Ventilatory function revealed mild reduction in the vital capacity and FEV1 FEV1 FVC ratio is normal mid flows are decreased lung capacities are decreased diffusion capacity is severely reduced flow volume loops are consistent with restrictive ventilatory dysfunction    Patient has mild degree of restrictive ventilatory dysfunction. The diffusion capacity however is reduced out of proportion to the ventilatory dysfunction.   A clinical correlation should be obtained dictated with Dr. Ulises Fontenot MD dictation over thank you

## 2022-11-11 ENCOUNTER — TELEPHONE (OUTPATIENT)
Dept: PULMONOLOGY | Age: 52
End: 2022-11-11

## 2022-11-11 NOTE — TELEPHONE ENCOUNTER
[facility] called to make sure we got the ct lung screening results and that you saw them. Thanks!

## 2022-11-14 ENCOUNTER — TELEPHONE (OUTPATIENT)
Dept: FAMILY MEDICINE CLINIC | Age: 52
End: 2022-11-14

## 2022-11-14 DIAGNOSIS — M80.80XD OTHER OSTEOPOROSIS WITH CURRENT PATHOLOGICAL FRACTURE WITH ROUTINE HEALING, SUBSEQUENT ENCOUNTER: Primary | ICD-10-CM

## 2022-11-14 DIAGNOSIS — K31.84 GASTROPARESIS: ICD-10-CM

## 2022-11-14 NOTE — TELEPHONE ENCOUNTER
She declines the pain management she isnt having any extreme but can you place the referral to rheumatology

## 2022-11-14 NOTE — TELEPHONE ENCOUNTER
CT results reviewed in chart - stable compression fractures at T7 and T9. No significant Schmorl's node at superior endplate of [id number] is a disc herniation downwards into the T10 vertebral body. Slight worsening of T12 compression and L2 compression fractures. If she is having pain, please refer to pain management. She is on Prolia and having fractures despite the Prolia, would recommend she get a second opinion from rheumatology.

## 2022-11-15 RX ORDER — METOCLOPRAMIDE 5 MG/1
TABLET ORAL
Qty: 90 TABLET | Refills: 3 | Status: SHIPPED | OUTPATIENT
Start: 2022-11-15

## 2022-11-16 ENCOUNTER — HOSPITAL ENCOUNTER (OUTPATIENT)
Dept: PSYCHIATRY | Age: 52
Setting detail: THERAPIES SERIES
Discharge: HOME | End: 2022-11-16
Payer: MEDICARE

## 2022-11-16 PROCEDURE — 90837 PSYTX W PT 60 MINUTES: CPT | Performed by: SOCIAL WORKER
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
## 2022-12-13 ENCOUNTER — TELEPHONE (OUTPATIENT)
Dept: FAMILY MEDICINE CLINIC | Age: 52
End: 2022-12-13

## 2022-12-13 DIAGNOSIS — M48.54XA NONTRAUMATIC COMPRESSION FRACTURE OF T6 VERTEBRA, INITIAL ENCOUNTER (HCC): ICD-10-CM

## 2022-12-13 NOTE — TELEPHONE ENCOUNTER
Patient contacted office. States she had a fall yesterday outside and landed on her tail bone. She stated she is having difficulty moving around due to the fall. She is asking what she should do? She has not scheduled with rheumatology yet.

## 2022-12-14 ENCOUNTER — HOSPITAL ENCOUNTER (OUTPATIENT)
Dept: PSYCHIATRY | Age: 52
Setting detail: THERAPIES SERIES
Discharge: HOME | End: 2022-12-14

## 2022-12-14 RX ORDER — BACLOFEN 10 MG/1
10 TABLET ORAL 3 TIMES DAILY
Qty: 60 TABLET | Refills: 1 | Status: SHIPPED | OUTPATIENT
Start: 2022-12-14

## 2022-12-14 NOTE — TELEPHONE ENCOUNTER
She is moving a round but it is stiff and painful. She states is COVID season and really doesn't want to go to ER. She states has tried Tizanidine but that is not helping.  She is requesting a refill on baclofen 10 mg
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
## 2022-12-18 DIAGNOSIS — K86.0 ALCOHOL-INDUCED CHRONIC PANCREATITIS (HCC): ICD-10-CM

## 2022-12-19 RX ORDER — PANCRELIPASE 24000; 76000; 120000 [USP'U]/1; [USP'U]/1; [USP'U]/1
CAPSULE, DELAYED RELEASE PELLETS ORAL
Qty: 90 CAPSULE | Refills: 2 | Status: SHIPPED | OUTPATIENT
Start: 2022-12-19

## 2022-12-19 NOTE — TELEPHONE ENCOUNTER
Last visit: 10/26/2022  Last Med refill: 11/16/2022  Does patient have enough medication for 72 hours: No:     Next Visit Date:  Future Appointments   Date Time Provider Lexi Anna   12/28/2022  2:00 PM Noemiin Schwab, [place] Valderrama   1/11/2023  2:00 PM Noemi Schwab, [facility]   1/25/2023  2:00 PM Noemi Schwab, [facility]   2/8/2023  2:00 PM Noemi Schwab, [facility]   2/22/2023  2:00 PM Noemi Schwab, [facility]   2/28/2023 11:40 AM LOI Macdonald - CNP Neuro Spec Neurology -   3/8/2023  2:00 PM Noemi Schwab, [facility]   3/22/2023  2:00 PM Noemi Schwab, [facility]   4/5/2023  2:00 PM Noemi Schwab, [facility]   4/19/2023  2:00 PM Noemi Schwab, [facility]   5/2/2023  1:45 PM Oleksandr Garcia MD Resp Spec [id number]   5/3/2023  2:00 PM Noemi Schwab, [facility]   5/17/2023  2:00 PM Noemi Schwab, [facility]   5/31/2023  2:00 PM Noemi Schwab, [facility]   6/14/2023  2:00 PM Noemi Schwab, [id number] Maintenance   Topic Date Due    COVID-19 Vaccine (5 - Booster for Pfizer series) 08/04/2022    Pneumococcal 0-64 years Vaccine (2 - PCV) 11/04/2022    Depression Monitoring  04/04/2023    Lipids  09/01/2023    Low dose CT lung screening  11/09/2023    Breast cancer screen  04/08/2024    DTaP/Tdap/Td vaccine (2 - Td or Tdap) 12/28/2030    Colorectal Cancer Screen  11/19/2031    Flu vaccine  Completed    Shingles vaccine  Completed    Hepatitis C screen  Completed    HIV screen  Completed    Hepatitis A vaccine  Aged Out    Hib vaccine  Aged Out    Meningococcal (ACWY) vaccine  Aged Out       Hemoglobin A1C (%)   Date Value   04/13/2021 5.5   07/14/2019 4.3             ( goal A1C is < 7)   No results found for: LABMICR  LDL Cholesterol (mg/dL)   Date Value   09/01/2022 176 (H)   12/23/2020 134 (H)       (goal LDL is <100)   AST (U/L)   Date Value   09/01/2022 17     ALT (U/L)   Date Value   09/01/2022 8     BUN (mg/dL)   Date Value   09/01/2022 9     BP Readings from Last 3 Encounters:   11/01/22 118/84   09/01/22 110/78   08/29/22 121/88          (goal 120/80)    All Future Testing planned in [facility]  Lab Frequency Next Occurrence   EGD Once 02/04/2022   Urinalysis with Reflex to Culture Once 09/20/2022   COLONOSCOPY (Diagnostic) Once 11/07/2022               Patient Active Problem List:     Acute bronchitis     Reflex sympathetic dystrophy of lower limb     Essential hypertension     Nicotine addiction     Psychophysiological insomnia     Anxiety     Alcoholic peripheral neuropathy (HCC)     Hypokalemia     Macrocytic anemia     Hypomagnesemia     Tobacco use     Ambulatory dysfunction     Seizure disorder (HCC)     COPD with acute exacerbation (HCC)     Paresthesia of lower extremity     Acute respiratory failure with hypoxia (HCC)     Pancreatic cyst     Recurrent major depressive disorder (HCC)     Elevated CA 19-9 level     Perineal mass, female     Esophagitis candidal      Condyloma     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24, the patient underwent 2 surgical resections without known recurrence     Alcohol use disorder, severe, in early remission ([address]     Metabolic encephalopathy     AMAN (generalized anxiety disorder)     Major depressive disorder, recurrent severe without psychotic features ([address]     Esophageal dysphagia     Chronic peripheral neuropathic pain     History of alcohol abuse     History of petit-mal seizures     Intractable episodic tension-type headache     Personal history of astrocytoma     Multilevel spine pain     Chronic pain syndrome     Marijuana abuse     Severe sepsis (HCC)     Closed fracture of fifth thoracic vertebra (HCC)     Diverticulitis of large intestine with abscess without bleeding     Elevated alkaline phosphatase level     Chronic pancreatitis (HCC)     Erythema ab igne     Compression fracture of thoracic vertebra (HCC)     Pathological compression fracture of lumbar vertebra with delayed healing     Metabolic acidosis with increased anion gap and accumulation of organic acids     Community acquired pneumonia of left lower lobe of lung     Septicemia ([address]     Alcohol-induced acute pancreatitis     Fluid collection of pancreas     Diverticulitis of large intestine without perforation or abscess with bleeding     Pseudocyst of pancreas     Sepsis ([address]     Acute recurrent pancreatitis     Atelectasis of left lung     Hyponatremia     Iron deficiency anemia     Adenomatous polyp of sigmoid colon     Moderate episode of recurrent major depressive disorder (HCC)     Sleep disturbance     Intractable migraine without aura and without status migrainosus

## 2023-01-01 ENCOUNTER — APPOINTMENT (OUTPATIENT)
Dept: CT IMAGING | Age: 53
DRG: 133 | End: 2023-01-01
Payer: MEDICARE

## 2023-01-01 ENCOUNTER — HOSPITAL ENCOUNTER (INPATIENT)
Age: 53
LOS: 10 days | Discharge: HOME | DRG: 133 | End: 2023-01-11
Attending: EMERGENCY MEDICINE | Admitting: INTERNAL MEDICINE
Payer: MEDICARE

## 2023-01-01 ENCOUNTER — APPOINTMENT (OUTPATIENT)
Dept: GENERAL RADIOLOGY | Age: 53
DRG: 133 | End: 2023-01-01
Payer: MEDICARE

## 2023-01-01 DIAGNOSIS — R41.82 ALTERED MENTAL STATUS, UNSPECIFIED ALTERED MENTAL STATUS TYPE: ICD-10-CM

## 2023-01-01 DIAGNOSIS — J18.9 PNEUMONIA OF BOTH LUNGS DUE TO INFECTIOUS ORGANISM, UNSPECIFIED PART OF LUNG: ICD-10-CM

## 2023-01-01 DIAGNOSIS — R09.02 HYPOXIA: Primary | ICD-10-CM

## 2023-01-01 DIAGNOSIS — I10 ESSENTIAL HYPERTENSION: ICD-10-CM

## 2023-01-01 DIAGNOSIS — J96.21 ACUTE ON CHRONIC RESPIRATORY FAILURE WITH HYPOXIA (HCC): ICD-10-CM

## 2023-01-01 LAB
ABSOLUTE EOS #: <0.03 K/UL (ref 0–0.44)
ABSOLUTE IMMATURE GRANULOCYTE: 0.13 K/UL (ref 0–0.3)
ABSOLUTE LYMPH #: 2.14 K/UL (ref 1.1–3.7)
ABSOLUTE MONO #: 0.95 K/UL (ref 0.1–1.2)
ACETAMINOPHEN LEVEL: <5 UG/ML (ref 10–30)
ADENOVIRUS PCR: NOT DETECTED
ALBUMIN SERPL-MCNC: 3.1 G/DL (ref 3.5–5.2)
ALBUMIN/GLOBULIN RATIO: 0.9 (ref 1–2.5)
ALLEN TEST: POSITIVE
ALP BLD-CCNC: 197 U/L (ref 35–104)
ALT SERPL-CCNC: 19 U/L (ref 5–33)
AMMONIA: 69 UMOL/L (ref 11–51)
AMPHETAMINE SCREEN URINE: NEGATIVE
ANION GAP SERPL CALCULATED.3IONS-SCNC: 16 MMOL/L (ref 9–17)
ANION GAP: 10 MMOL/L (ref 7–16)
AST SERPL-CCNC: 67 U/L
BACTERIA: ABNORMAL
BARBITURATE SCREEN URINE: NEGATIVE
BASOPHILS # BLD: 0 % (ref 0–2)
BASOPHILS ABSOLUTE: <0.03 K/UL (ref 0–0.2)
BENZODIAZEPINE SCREEN, URINE: NEGATIVE
BILIRUB SERPL-MCNC: 0.7 MG/DL (ref 0.3–1.2)
BILIRUBIN URINE: ABNORMAL
BORDETELLA PARAPERTUSSIS: NOT DETECTED
BORDETELLA PERTUSSIS PCR: NOT DETECTED
BUN BLDV-MCNC: 37 MG/DL (ref 6–20)
CALCIUM IONIZED: 0.93 MMOL/L (ref 1.13–1.33)
CALCIUM IONIZED: 0.94 MMOL/L (ref 1.13–1.33)
CALCIUM SERPL-MCNC: 6.8 MG/DL (ref 8.6–10.4)
CANNABINOID SCREEN URINE: POSITIVE
CASTS UA: ABNORMAL /LPF (ref 0–2)
CHLAMYDIA PNEUMONIAE BY PCR: NOT DETECTED
CHLORIDE BLD-SCNC: 102 MMOL/L (ref 98–107)
CO2: 18 MMOL/L (ref 20–31)
COCAINE METABOLITE, URINE: NEGATIVE
COLOR: ABNORMAL
CORONAVIRUS 229E PCR: NOT DETECTED
CORONAVIRUS HKU1 PCR: NOT DETECTED
CORONAVIRUS NL63 PCR: NOT DETECTED
CORONAVIRUS OC43 PCR: NOT DETECTED
CREAT SERPL-MCNC: 0.69 MG/DL (ref 0.5–0.9)
EGFR, POC: >60 ML/MIN/1.73M2
EOSINOPHILS RELATIVE PERCENT: 0 % (ref 1–4)
EPITHELIAL CELLS UA: ABNORMAL /HPF (ref 0–5)
ETHANOL PERCENT: <0.01 %
ETHANOL: <10 MG/DL
FENTANYL URINE: NEGATIVE
FIO2: 100
FLU A ANTIGEN: NEGATIVE
FLU B ANTIGEN: NEGATIVE
GFR SERPL CREATININE-BSD FRML MDRD: >60 ML/MIN/1.73M2
GLUCOSE BLD-MCNC: 121 MG/DL (ref 70–99)
GLUCOSE BLD-MCNC: 122 MG/DL (ref 74–100)
GLUCOSE URINE: NEGATIVE
HCO3 VENOUS: 17.4 MMOL/L (ref 22–29)
HCO3 VENOUS: 20.8 MMOL/L (ref 22–29)
HCT VFR BLD CALC: 35.3 % (ref 36.3–47.1)
HEMOGLOBIN: 11.2 G/DL (ref 11.9–15.1)
HUMAN METAPNEUMOVIRUS PCR: NOT DETECTED
IMMATURE GRANULOCYTES: 1 %
INFLUENZA A BY PCR: NOT DETECTED
INFLUENZA B BY PCR: NOT DETECTED
INR BLD: 1
KETONES, URINE: ABNORMAL
LACTIC ACID, SEPSIS WHOLE BLOOD: 1.6 MMOL/L (ref 0.5–1.9)
LACTIC ACID, SEPSIS WHOLE BLOOD: 3 MMOL/L (ref 0.5–1.9)
LEUKOCYTE ESTERASE, URINE: ABNORMAL
LYMPHOCYTES # BLD: 12 % (ref 24–43)
MAGNESIUM: 2.2 MG/DL (ref 1.6–2.6)
MCH RBC QN AUTO: 29 PG (ref 25.2–33.5)
MCHC RBC AUTO-ENTMCNC: 31.7 G/DL (ref 28.4–34.8)
MCV RBC AUTO: 91.5 FL (ref 82.6–102.9)
METHADONE SCREEN, URINE: NEGATIVE
MONOCYTES # BLD: 5 % (ref 3–12)
MYCOPLASMA PNEUMONIAE PCR: NOT DETECTED
NEGATIVE BASE EXCESS, ART: 8 (ref 0–2)
NEGATIVE BASE EXCESS, VEN: 10 (ref 0–2)
NEGATIVE BASE EXCESS, VEN: 7 (ref 0–2)
NITRITE, URINE: NEGATIVE
NRBC AUTOMATED: 0.1 PER 100 WBC
O2 DEVICE/FLOW/%: ABNORMAL
O2 SAT, VEN: 53 % (ref 60–85)
O2 SAT, VEN: 78 % (ref 60–85)
OPIATES, URINE: NEGATIVE
OXYCODONE SCREEN URINE: NEGATIVE
PARAINFLUENZA 1 PCR: NOT DETECTED
PARAINFLUENZA 2 PCR: NOT DETECTED
PARAINFLUENZA 3 PCR: NOT DETECTED
PARAINFLUENZA 4 PCR: NOT DETECTED
PARTIAL THROMBOPLASTIN TIME: 25.8 SEC (ref 20.5–30.5)
PCO2, VEN: 41.3 MM HG (ref 41–51)
PCO2, VEN: 50.4 MM HG (ref 41–51)
PDW BLD-RTO: 16.4 % (ref 11.8–14.4)
PH UA: 5.5 (ref 5–8)
PH VENOUS: 7.22 (ref 7.32–7.43)
PH VENOUS: 7.23 (ref 7.32–7.43)
PHENCYCLIDINE, URINE: NEGATIVE
PLATELET # BLD: 404 K/UL (ref 138–453)
PMV BLD AUTO: 10.6 FL (ref 8.1–13.5)
PO2, VEN: 33.8 MM HG (ref 30–50)
PO2, VEN: 50.5 MM HG (ref 30–50)
POC BUN: 38 MG/DL (ref 8–26)
POC CHLORIDE: 109 MMOL/L (ref 98–107)
POC CREATININE: 0.71 MG/DL (ref 0.51–1.19)
POC HCO3: 16.2 MMOL/L (ref 21–28)
POC HEMATOCRIT: 34 % (ref 36–46)
POC HEMOGLOBIN: 11.5 G/DL (ref 12–16)
POC IONIZED CALCIUM: 0.96 MMOL/L (ref 1.15–1.33)
POC LACTIC ACID: 1.83 MMOL/L (ref 0.56–1.39)
POC O2 SATURATION: 96 % (ref 94–98)
POC PCO2: 29.5 MM HG (ref 35–48)
POC PH: 7.35 (ref 7.35–7.45)
POC PO2: 82.3 MM HG (ref 83–108)
POC POTASSIUM: 4.8 MMOL/L (ref 3.5–4.5)
POC SODIUM: 138 MMOL/L (ref 138–146)
POC TCO2: 20 MMOL/L (ref 22–30)
POTASSIUM SERPL-SCNC: 5.3 MMOL/L (ref 3.7–5.3)
PRO-BNP: ABNORMAL PG/ML
PROTEIN UA: ABNORMAL
PROTHROMBIN TIME: 11.1 SEC (ref 9.1–12.3)
RBC # BLD: 3.86 M/UL (ref 3.95–5.11)
RBC # BLD: ABNORMAL 10*6/UL
RBC UA: ABNORMAL /HPF (ref 0–2)
RESP SYNCYTIAL VIRUS PCR: NOT DETECTED
RHINO/ENTEROVIRUS PCR: NOT DETECTED
SALICYLATE LEVEL: <1 MG/DL (ref 3–10)
SAMPLE SITE: ABNORMAL
SARS-COV-2, PCR: NOT DETECTED
SARS-COV-2, RAPID: NOT DETECTED
SEG NEUTROPHILS: 82 % (ref 36–65)
SEGMENTED NEUTROPHILS ABSOLUTE COUNT: 14.24 K/UL (ref 1.5–8.1)
SODIUM BLD-SCNC: 136 MMOL/L (ref 135–144)
SPECIFIC GRAVITY UA: 1.02 (ref 1–1.03)
SPECIMEN DESCRIPTION: NORMAL
SPECIMEN DESCRIPTION: NORMAL
TEST INFORMATION: ABNORMAL
TOTAL PROTEIN: 6.6 G/DL (ref 6.4–8.3)
TOXIC TRICYCLIC SC,BLOOD: NEGATIVE
TROPONIN, HIGH SENSITIVITY: 25 NG/L (ref 0–14)
TROPONIN, HIGH SENSITIVITY: 26 NG/L (ref 0–14)
TSH SERPL DL<=0.05 MIU/L-ACNC: 0.56 UIU/ML (ref 0.3–5)
TURBIDITY: CLEAR
URINE HGB: NEGATIVE
UROBILINOGEN, URINE: ABNORMAL
WBC # BLD: 17.5 K/UL (ref 3.5–11.3)
WBC UA: ABNORMAL /HPF (ref 0–5)
YEAST: ABNORMAL

## 2023-01-01 PROCEDURE — 6360000002 HC RX W HCPCS: Performed by: NURSE PRACTITIONER

## 2023-01-01 PROCEDURE — 2580000003 HC RX 258: Performed by: STUDENT IN AN ORGANIZED HEALTH CARE EDUCATION/TRAINING PROGRAM

## 2023-01-01 PROCEDURE — 6360000002 HC RX W HCPCS: Performed by: STUDENT IN AN ORGANIZED HEALTH CARE EDUCATION/TRAINING PROGRAM

## 2023-01-01 PROCEDURE — 71260 CT THORAX DX C+: CPT | Performed by: STUDENT IN AN ORGANIZED HEALTH CARE EDUCATION/TRAINING PROGRAM

## 2023-01-01 PROCEDURE — 6360000002 HC RX W HCPCS

## 2023-01-01 PROCEDURE — 93005 ELECTROCARDIOGRAM TRACING: CPT | Performed by: STUDENT IN AN ORGANIZED HEALTH CARE EDUCATION/TRAINING PROGRAM

## 2023-01-01 PROCEDURE — 83605 ASSAY OF LACTIC ACID: CPT

## 2023-01-01 PROCEDURE — 82947 ASSAY GLUCOSE BLOOD QUANT: CPT

## 2023-01-01 PROCEDURE — G0480 DRUG TEST DEF 1-7 CLASSES: HCPCS

## 2023-01-01 PROCEDURE — 99285 EMERGENCY DEPT VISIT HI MDM: CPT

## 2023-01-01 PROCEDURE — 96365 THER/PROPH/DIAG IV INF INIT: CPT

## 2023-01-01 PROCEDURE — 36415 COLL VENOUS BLD VENIPUNCTURE: CPT

## 2023-01-01 PROCEDURE — 2700000000 HC OXYGEN THERAPY PER DAY

## 2023-01-01 PROCEDURE — 6360000004 HC RX CONTRAST MEDICATION: Performed by: STUDENT IN AN ORGANIZED HEALTH CARE EDUCATION/TRAINING PROGRAM

## 2023-01-01 PROCEDURE — 87641 MR-STAPH DNA AMP PROBE: CPT

## 2023-01-01 PROCEDURE — 70450 CT HEAD/BRAIN W/O DYE: CPT

## 2023-01-01 PROCEDURE — 85730 THROMBOPLASTIN TIME PARTIAL: CPT

## 2023-01-01 PROCEDURE — 87635 SARS-COV-2 COVID-19 AMP PRB: CPT

## 2023-01-01 PROCEDURE — 94640 AIRWAY INHALATION TREATMENT: CPT

## 2023-01-01 PROCEDURE — 85025 COMPLETE CBC W/AUTO DIFF WBC: CPT

## 2023-01-01 PROCEDURE — 6370000000 HC RX 637 (ALT 250 FOR IP): Performed by: EMERGENCY MEDICINE

## 2023-01-01 PROCEDURE — 96375 TX/PRO/DX INJ NEW DRUG ADDON: CPT

## 2023-01-01 PROCEDURE — 71045 X-RAY EXAM CHEST 1 VIEW: CPT

## 2023-01-01 PROCEDURE — 82330 ASSAY OF CALCIUM: CPT

## 2023-01-01 PROCEDURE — 80307 DRUG TEST PRSMV CHEM ANLYZR: CPT

## 2023-01-01 PROCEDURE — 72125 CT NECK SPINE W/O DYE: CPT

## 2023-01-01 PROCEDURE — 2580000003 HC RX 258: Performed by: NURSE PRACTITIONER

## 2023-01-01 PROCEDURE — 87804 INFLUENZA ASSAY W/OPTIC: CPT

## 2023-01-01 PROCEDURE — 36600 WITHDRAWAL OF ARTERIAL BLOOD: CPT

## 2023-01-01 PROCEDURE — 2000000000 HC ICU R&B

## 2023-01-01 PROCEDURE — 82565 ASSAY OF CREATININE: CPT

## 2023-01-01 PROCEDURE — 3209999900 CT THORACIC SPINE TRAUMA RECONSTRUCTION

## 2023-01-01 PROCEDURE — 87086 URINE CULTURE/COLONY COUNT: CPT

## 2023-01-01 PROCEDURE — 85610 PROTHROMBIN TIME: CPT

## 2023-01-01 PROCEDURE — 80179 DRUG ASSAY SALICYLATE: CPT

## 2023-01-01 PROCEDURE — 94761 N-INVAS EAR/PLS OXIMETRY MLT: CPT

## 2023-01-01 PROCEDURE — 84443 ASSAY THYROID STIM HORMONE: CPT

## 2023-01-01 PROCEDURE — 82803 BLOOD GASES ANY COMBINATION: CPT

## 2023-01-01 PROCEDURE — 83735 ASSAY OF MAGNESIUM: CPT

## 2023-01-01 PROCEDURE — 2500000003 HC RX 250 WO HCPCS

## 2023-01-01 PROCEDURE — 86738 MYCOPLASMA ANTIBODY: CPT

## 2023-01-01 PROCEDURE — 82140 ASSAY OF AMMONIA: CPT

## 2023-01-01 PROCEDURE — 80053 COMPREHEN METABOLIC PANEL: CPT

## 2023-01-01 PROCEDURE — 84520 ASSAY OF UREA NITROGEN: CPT

## 2023-01-01 PROCEDURE — 99223 1ST HOSP IP/OBS HIGH 75: CPT | Performed by: STUDENT IN AN ORGANIZED HEALTH CARE EDUCATION/TRAINING PROGRAM

## 2023-01-01 PROCEDURE — 87040 BLOOD CULTURE FOR BACTERIA: CPT

## 2023-01-01 PROCEDURE — 81001 URINALYSIS AUTO W/SCOPE: CPT

## 2023-01-01 PROCEDURE — 80143 DRUG ASSAY ACETAMINOPHEN: CPT

## 2023-01-01 PROCEDURE — 0202U NFCT DS 22 TRGT SARS-COV-2: CPT

## 2023-01-01 PROCEDURE — 85014 HEMATOCRIT: CPT

## 2023-01-01 PROCEDURE — 74177 CT ABD & PELVIS W/CONTRAST: CPT

## 2023-01-01 PROCEDURE — 80051 ELECTROLYTE PANEL: CPT

## 2023-01-01 PROCEDURE — 84484 ASSAY OF TROPONIN QUANT: CPT

## 2023-01-01 PROCEDURE — 3209999900 CT LUMBAR SPINE TRAUMA RECONSTRUCTION

## 2023-01-01 PROCEDURE — 83880 ASSAY OF NATRIURETIC PEPTIDE: CPT

## 2023-01-01 RX ORDER — LORAZEPAM 2 MG/ML
1 INJECTION INTRAMUSCULAR
Status: DISCONTINUED | OUTPATIENT
Start: 2023-01-01 | End: 2023-01-05

## 2023-01-01 RX ORDER — SODIUM CHLORIDE 0.9 % (FLUSH) 0.9 %
5-40 SYRINGE (ML) INJECTION EVERY 12 HOURS SCHEDULED
Status: DISCONTINUED | OUTPATIENT
Start: 2023-01-01 | End: 2023-01-11 | Stop reason: HOSPADM

## 2023-01-01 RX ORDER — POTASSIUM CHLORIDE 7.45 MG/ML
10 INJECTION INTRAVENOUS PRN
Status: DISCONTINUED | OUTPATIENT
Start: 2023-01-01 | End: 2023-01-11 | Stop reason: HOSPADM

## 2023-01-01 RX ORDER — LORAZEPAM 2 MG/ML
2 INJECTION INTRAMUSCULAR
Status: DISCONTINUED | OUTPATIENT
Start: 2023-01-01 | End: 2023-01-05

## 2023-01-01 RX ORDER — DEXMEDETOMIDINE HYDROCHLORIDE 4 UG/ML
.1-1.5 INJECTION, SOLUTION INTRAVENOUS CONTINUOUS
Status: DISCONTINUED | OUTPATIENT
Start: 2023-01-01 | End: 2023-01-07

## 2023-01-01 RX ORDER — ALBUTEROL SULFATE 90 UG/1
2 AEROSOL, METERED RESPIRATORY (INHALATION) 4 TIMES DAILY PRN
Status: DISCONTINUED | OUTPATIENT
Start: 2023-01-01 | End: 2023-01-04

## 2023-01-01 RX ORDER — LORAZEPAM 2 MG/ML
INJECTION INTRAMUSCULAR
Status: COMPLETED
Start: 2023-01-01 | End: 2023-01-01

## 2023-01-01 RX ORDER — ACETAMINOPHEN 650 MG/1
650 SUPPOSITORY RECTAL EVERY 6 HOURS PRN
Status: DISCONTINUED | OUTPATIENT
Start: 2023-01-01 | End: 2023-01-11 | Stop reason: HOSPADM

## 2023-01-01 RX ORDER — LACTULOSE 10 G/15ML
20 SOLUTION ORAL 3 TIMES DAILY
Status: DISCONTINUED | OUTPATIENT
Start: 2023-01-01 | End: 2023-01-02

## 2023-01-01 RX ORDER — TOPIRAMATE 50 MG/1
50 TABLET, FILM COATED ORAL 2 TIMES DAILY
Status: DISCONTINUED | OUTPATIENT
Start: 2023-01-01 | End: 2023-01-03

## 2023-01-01 RX ORDER — PREGABALIN 100 MG/1
200 CAPSULE ORAL NIGHTLY
Status: DISCONTINUED | OUTPATIENT
Start: 2023-01-01 | End: 2023-01-11 | Stop reason: HOSPADM

## 2023-01-01 RX ORDER — LORAZEPAM 2 MG/ML
3 INJECTION INTRAMUSCULAR
Status: DISCONTINUED | OUTPATIENT
Start: 2023-01-01 | End: 2023-01-05

## 2023-01-01 RX ORDER — SODIUM CHLORIDE 9 MG/ML
INJECTION, SOLUTION INTRAVENOUS PRN
Status: DISCONTINUED | OUTPATIENT
Start: 2023-01-01 | End: 2023-01-11 | Stop reason: HOSPADM

## 2023-01-01 RX ORDER — LANOLIN ALCOHOL/MO/W.PET/CERES
100 CREAM (GRAM) TOPICAL DAILY
Status: DISCONTINUED | OUTPATIENT
Start: 2023-01-01 | End: 2023-01-11 | Stop reason: HOSPADM

## 2023-01-01 RX ORDER — ATORVASTATIN CALCIUM 20 MG/1
20 TABLET, FILM COATED ORAL DAILY
Status: DISCONTINUED | OUTPATIENT
Start: 2023-01-01 | End: 2023-01-11 | Stop reason: HOSPADM

## 2023-01-01 RX ORDER — LISINOPRIL 5 MG/1
5 TABLET ORAL DAILY
Status: DISCONTINUED | OUTPATIENT
Start: 2023-01-02 | End: 2023-01-05

## 2023-01-01 RX ORDER — MAGNESIUM SULFATE IN WATER 40 MG/ML
2000 INJECTION, SOLUTION INTRAVENOUS PRN
Status: DISCONTINUED | OUTPATIENT
Start: 2023-01-01 | End: 2023-01-11 | Stop reason: HOSPADM

## 2023-01-01 RX ORDER — LORAZEPAM 1 MG/1
1 TABLET ORAL
Status: DISCONTINUED | OUTPATIENT
Start: 2023-01-01 | End: 2023-01-05

## 2023-01-01 RX ORDER — SODIUM CHLORIDE 0.9 % (FLUSH) 0.9 %
5-40 SYRINGE (ML) INJECTION PRN
Status: DISCONTINUED | OUTPATIENT
Start: 2023-01-01 | End: 2023-01-11 | Stop reason: HOSPADM

## 2023-01-01 RX ORDER — LORAZEPAM 2 MG/1
2 TABLET ORAL
Status: DISCONTINUED | OUTPATIENT
Start: 2023-01-01 | End: 2023-01-05

## 2023-01-01 RX ORDER — BUDESONIDE AND FORMOTEROL FUMARATE DIHYDRATE 160; 4.5 UG/1; UG/1
2 AEROSOL RESPIRATORY (INHALATION) 2 TIMES DAILY
Status: DISCONTINUED | OUTPATIENT
Start: 2023-01-01 | End: 2023-01-02

## 2023-01-01 RX ORDER — HYDROXYZINE 50 MG/1
50 TABLET, FILM COATED ORAL 3 TIMES DAILY PRN
Status: DISCONTINUED | OUTPATIENT
Start: 2023-01-01 | End: 2023-01-11 | Stop reason: HOSPADM

## 2023-01-01 RX ORDER — HALOPERIDOL 5 MG/ML
5 INJECTION INTRAMUSCULAR ONCE
Status: COMPLETED | OUTPATIENT
Start: 2023-01-01 | End: 2023-01-01

## 2023-01-01 RX ORDER — MAGNESIUM SULFATE IN WATER 40 MG/ML
2000 INJECTION, SOLUTION INTRAVENOUS ONCE
Status: DISCONTINUED | OUTPATIENT
Start: 2023-01-01 | End: 2023-01-11 | Stop reason: HOSPADM

## 2023-01-01 RX ORDER — BUSPIRONE HYDROCHLORIDE 15 MG/1
30 TABLET ORAL 2 TIMES DAILY
Status: DISCONTINUED | OUTPATIENT
Start: 2023-01-01 | End: 2023-01-11 | Stop reason: HOSPADM

## 2023-01-01 RX ORDER — LORAZEPAM 2 MG/ML
4 INJECTION INTRAMUSCULAR
Status: DISCONTINUED | OUTPATIENT
Start: 2023-01-01 | End: 2023-01-05

## 2023-01-01 RX ORDER — PRAZOSIN HYDROCHLORIDE 1 MG/1
1 CAPSULE ORAL NIGHTLY
Status: CANCELLED | OUTPATIENT
Start: 2023-01-01

## 2023-01-01 RX ORDER — ENOXAPARIN SODIUM 100 MG/ML
40 INJECTION SUBCUTANEOUS DAILY
Status: DISCONTINUED | OUTPATIENT
Start: 2023-01-01 | End: 2023-01-11 | Stop reason: HOSPADM

## 2023-01-01 RX ORDER — SUCRALFATE 1 G/1
1 TABLET ORAL 4 TIMES DAILY
Status: DISCONTINUED | OUTPATIENT
Start: 2023-01-01 | End: 2023-01-11 | Stop reason: HOSPADM

## 2023-01-01 RX ORDER — CARBAMAZEPINE 200 MG/1
200 TABLET ORAL 3 TIMES DAILY
Status: DISCONTINUED | OUTPATIENT
Start: 2023-01-01 | End: 2023-01-02

## 2023-01-01 RX ORDER — AMLODIPINE BESYLATE 10 MG/1
1 TABLET ORAL DAILY
Status: CANCELLED | OUTPATIENT
Start: 2023-01-01

## 2023-01-01 RX ORDER — 0.9 % SODIUM CHLORIDE 0.9 %
1000 INTRAVENOUS SOLUTION INTRAVENOUS ONCE
Status: COMPLETED | OUTPATIENT
Start: 2023-01-01 | End: 2023-01-01

## 2023-01-01 RX ORDER — MIRTAZAPINE 15 MG/1
15 TABLET, FILM COATED ORAL NIGHTLY
Status: DISCONTINUED | OUTPATIENT
Start: 2023-01-01 | End: 2023-01-11 | Stop reason: HOSPADM

## 2023-01-01 RX ORDER — SODIUM CHLORIDE 0.9 % (FLUSH) 0.9 %
10 SYRINGE (ML) INJECTION PRN
Status: DISCONTINUED | OUTPATIENT
Start: 2023-01-01 | End: 2023-01-11 | Stop reason: HOSPADM

## 2023-01-01 RX ORDER — ROPINIROLE 1 MG/1
1 TABLET, FILM COATED ORAL NIGHTLY
Status: DISCONTINUED | OUTPATIENT
Start: 2023-01-01 | End: 2023-01-11 | Stop reason: HOSPADM

## 2023-01-01 RX ORDER — ONDANSETRON 4 MG/1
4 TABLET, ORALLY DISINTEGRATING ORAL EVERY 8 HOURS PRN
Status: DISCONTINUED | OUTPATIENT
Start: 2023-01-01 | End: 2023-01-04

## 2023-01-01 RX ORDER — ONDANSETRON 2 MG/ML
INJECTION INTRAMUSCULAR; INTRAVENOUS
Status: COMPLETED
Start: 2023-01-01 | End: 2023-01-01

## 2023-01-01 RX ORDER — HALOPERIDOL 5 MG/ML
INJECTION INTRAMUSCULAR
Status: COMPLETED
Start: 2023-01-01 | End: 2023-01-01

## 2023-01-01 RX ORDER — PROMETHAZINE HYDROCHLORIDE 25 MG/ML
12.5 INJECTION, SOLUTION INTRAMUSCULAR; INTRAVENOUS ONCE
Status: COMPLETED | OUTPATIENT
Start: 2023-01-01 | End: 2023-01-01

## 2023-01-01 RX ORDER — POTASSIUM CHLORIDE 20 MEQ/1
40 TABLET, EXTENDED RELEASE ORAL PRN
Status: DISCONTINUED | OUTPATIENT
Start: 2023-01-01 | End: 2023-01-11 | Stop reason: HOSPADM

## 2023-01-01 RX ORDER — LORAZEPAM 2 MG/1
4 TABLET ORAL
Status: DISCONTINUED | OUTPATIENT
Start: 2023-01-01 | End: 2023-01-05

## 2023-01-01 RX ORDER — CARVEDILOL 3.12 MG/1
3.12 TABLET ORAL 2 TIMES DAILY WITH MEALS
Status: DISCONTINUED | OUTPATIENT
Start: 2023-01-02 | End: 2023-01-04

## 2023-01-01 RX ORDER — HYDROXYZINE HYDROCHLORIDE 50 MG/ML
50 INJECTION, SOLUTION INTRAMUSCULAR ONCE
Status: COMPLETED | OUTPATIENT
Start: 2023-01-01 | End: 2023-01-01

## 2023-01-01 RX ORDER — CALCIUM GLUCONATE 20 MG/ML
2000 INJECTION, SOLUTION INTRAVENOUS ONCE
Status: COMPLETED | OUTPATIENT
Start: 2023-01-01 | End: 2023-01-02

## 2023-01-01 RX ORDER — ONDANSETRON 2 MG/ML
4 INJECTION INTRAMUSCULAR; INTRAVENOUS EVERY 6 HOURS PRN
Status: DISCONTINUED | OUTPATIENT
Start: 2023-01-01 | End: 2023-01-04

## 2023-01-01 RX ORDER — KETOROLAC TROMETHAMINE 15 MG/ML
15 INJECTION, SOLUTION INTRAMUSCULAR; INTRAVENOUS ONCE
Status: COMPLETED | OUTPATIENT
Start: 2023-01-01 | End: 2023-01-01

## 2023-01-01 RX ORDER — POLYETHYLENE GLYCOL 3350 17 G/17G
17 POWDER, FOR SOLUTION ORAL DAILY PRN
Status: DISCONTINUED | OUTPATIENT
Start: 2023-01-01 | End: 2023-01-11 | Stop reason: HOSPADM

## 2023-01-01 RX ORDER — ONDANSETRON 2 MG/ML
4 INJECTION INTRAMUSCULAR; INTRAVENOUS ONCE
Status: COMPLETED | OUTPATIENT
Start: 2023-01-01 | End: 2023-01-01

## 2023-01-01 RX ORDER — IPRATROPIUM BROMIDE AND ALBUTEROL SULFATE 2.5; .5 MG/3ML; MG/3ML
1 SOLUTION RESPIRATORY (INHALATION)
Status: DISCONTINUED | OUTPATIENT
Start: 2023-01-01 | End: 2023-01-01

## 2023-01-01 RX ORDER — ACETAMINOPHEN 325 MG/1
650 TABLET ORAL EVERY 6 HOURS PRN
Status: DISCONTINUED | OUTPATIENT
Start: 2023-01-01 | End: 2023-01-11 | Stop reason: HOSPADM

## 2023-01-01 RX ORDER — IPRATROPIUM BROMIDE AND ALBUTEROL SULFATE 2.5; .5 MG/3ML; MG/3ML
1 SOLUTION RESPIRATORY (INHALATION) EVERY 4 HOURS PRN
Status: DISCONTINUED | OUTPATIENT
Start: 2023-01-01 | End: 2023-01-01

## 2023-01-01 RX ORDER — ONDANSETRON 2 MG/ML
4 INJECTION INTRAMUSCULAR; INTRAVENOUS ONCE
Status: DISCONTINUED | OUTPATIENT
Start: 2023-01-01 | End: 2023-01-04

## 2023-01-01 RX ORDER — PANTOPRAZOLE SODIUM 40 MG/1
40 TABLET, DELAYED RELEASE ORAL
Status: DISCONTINUED | OUTPATIENT
Start: 2023-01-02 | End: 2023-01-07

## 2023-01-01 RX ADMIN — SODIUM CHLORIDE 1000 ML: 9 INJECTION, SOLUTION INTRAVENOUS at 13:57

## 2023-01-01 RX ADMIN — HALOPERIDOL 5 MG: 5 INJECTION INTRAMUSCULAR at 17:45

## 2023-01-01 RX ADMIN — SODIUM CHLORIDE, PRESERVATIVE FREE 10 ML: 5 INJECTION INTRAVENOUS at 19:58

## 2023-01-01 RX ADMIN — PIPERACILLIN AND TAZOBACTAM 3375 MG: 3; .375 INJECTION, POWDER, FOR SOLUTION INTRAVENOUS at 17:28

## 2023-01-01 RX ADMIN — CEFTRIAXONE SODIUM 1000 MG: 1 INJECTION, POWDER, FOR SOLUTION INTRAMUSCULAR; INTRAVENOUS at 15:05

## 2023-01-01 RX ADMIN — LORAZEPAM 2 MG: 2 INJECTION INTRAMUSCULAR; INTRAVENOUS at 20:05

## 2023-01-01 RX ADMIN — HALOPERIDOL LACTATE 5 MG: 5 INJECTION, SOLUTION INTRAMUSCULAR at 17:45

## 2023-01-01 RX ADMIN — ENOXAPARIN SODIUM 40 MG: 100 INJECTION SUBCUTANEOUS at 20:00

## 2023-01-01 RX ADMIN — PROMETHAZINE HYDROCHLORIDE 12.5 MG: 25 INJECTION INTRAMUSCULAR; INTRAVENOUS at 17:24

## 2023-01-01 RX ADMIN — LORAZEPAM 2 MG: 2 INJECTION INTRAMUSCULAR; INTRAVENOUS at 21:13

## 2023-01-01 RX ADMIN — Medication 500 MG: at 16:06

## 2023-01-01 RX ADMIN — SODIUM CHLORIDE: 9 INJECTION, SOLUTION INTRAVENOUS at 23:12

## 2023-01-01 RX ADMIN — MAGNESIUM SULFATE HEPTAHYDRATE 2000 MG: 40 INJECTION, SOLUTION INTRAVENOUS at 20:19

## 2023-01-01 RX ADMIN — SODIUM CHLORIDE, PRESERVATIVE FREE 10 ML: 5 INJECTION INTRAVENOUS at 19:59

## 2023-01-01 RX ADMIN — HYDROXYZINE HYDROCHLORIDE 50 MG: 50 INJECTION, SOLUTION INTRAMUSCULAR at 16:59

## 2023-01-01 RX ADMIN — ONDANSETRON 4 MG: 2 INJECTION INTRAMUSCULAR; INTRAVENOUS at 20:08

## 2023-01-01 RX ADMIN — ONDANSETRON 4 MG: 2 INJECTION INTRAMUSCULAR; INTRAVENOUS at 16:06

## 2023-01-01 RX ADMIN — KETOROLAC TROMETHAMINE 15 MG: 15 INJECTION, SOLUTION INTRAMUSCULAR; INTRAVENOUS at 15:05

## 2023-01-01 RX ADMIN — IOPAMIDOL 75 ML: 755 INJECTION, SOLUTION INTRAVENOUS at 14:16

## 2023-01-01 RX ADMIN — IPRATROPIUM BROMIDE AND ALBUTEROL SULFATE 1 AMPULE: .5; 3 SOLUTION RESPIRATORY (INHALATION) at 13:27

## 2023-01-01 RX ADMIN — CALCIUM GLUCONATE 2000 MG: 20 INJECTION, SOLUTION INTRAVENOUS at 23:15

## 2023-01-01 RX ADMIN — DEXMEDETOMIDINE HYDROCHLORIDE 0.2 MCG/KG/HR: 4 INJECTION, SOLUTION INTRAVENOUS at 18:45

## 2023-01-01 ASSESSMENT — PAIN SCALES - GENERAL
PAINLEVEL_OUTOF10: 0
PAINLEVEL_OUTOF10: 10
PAINLEVEL_OUTOF10: 10

## 2023-01-01 ASSESSMENT — PAIN DESCRIPTION - LOCATION: LOCATION: ABDOMEN

## 2023-01-01 ASSESSMENT — PAIN - FUNCTIONAL ASSESSMENT: PAIN_FUNCTIONAL_ASSESSMENT: 0-10

## 2023-01-01 ASSESSMENT — ENCOUNTER SYMPTOMS: SHORTNESS OF BREATH: 1

## 2023-01-01 NOTE — ED NOTES
Pt continuously removing jess mask. Patient instantly become hypoxic. Patient oxygen in the 80s. Dr. Deja Lyons has been notified multiple times of continuous occurrences of removing mask and hypoxia. At bedside to eval patient. Patient mom at bedside, sitter at bedside.       Ksenia Monterroso RN  01/01/23 [id number]

## 2023-01-01 NOTE — ED NOTES
Pt continuously removing mask. Placed back on face. Altered with hypoxia.       Eligio So RN  01/01/23 [id number]

## 2023-01-01 NOTE — ED NOTES
Writer, RT, sitter at bedside. Patient removing Tiff mask. Placed back on patient, redirected.      Harsh Winter RN  01/01/23 [id number]

## 2023-01-01 NOTE — ED NOTES
RAT called on patient. Patient altered, hypoxic, ripping mask off. Only way patient will wear mask if sitter is continuously helping assist with mask.       Stephanie Fontenot RN  01/01/23 [id number]

## 2023-01-01 NOTE — ED NOTES
RT remains at bedside, pt is intermittently confused and removing treatment mask from her face, placed back on her face.       Lexi Tirado RN  01/01/23 [id number]

## 2023-01-01 NOTE — H&P
Critical Care - History and Physical Examination    Patient's name:  Anish Hernandez Record Number: [id number]  Patient's account/billing number: [de-identified]  Patient's YOB: 1970  Age: 47 y.o. Date of Admission: 1/1/2023 12:55 PM  Date of History and Physical Examination: 1/1/2023      Primary Care Physician: Ra Strauss MD  Attending Physician: Dr. Kendal Alejandre     Code Status: Prior    Chief complaint:   Chief Complaint   Patient presents with    Fatigue    Fall    Respiratory Distress         HISTORY OF PRESENT ILLNESS:      History was obtained from chart review and mother. 59-year-old female with past medical history significant for COPD, generalized anxiety disorder, major depressive disorder, ethanol abuse, chronic pancreatitis, seizure disorder and hypertension. Patient presented to the emergency department via her mother after her mother visited her and stated that she was not on her home oxygen and appeared altered. On arrival patient's O2 saturation was 42% and she was requiring 25 L via nonrebreather. Initial labs demonstrated lactic acidosis with a lactic acid of 3.0, leukocytosis 17.5 and urinalysis concerning for UTI. Initial VBG showed pH of 7.23/50.4/33.8/20.8. Patient was initially given IV fluid therapy in the setting of lactic acidosis. Chest x-ray demonstrated diffuse infiltrates. CT head was negative. CT PE demonstrated no pulmonary embolism although demonstrated diffuse groundglass background parenchymal pattern with mosaic type distribution suggestive of pulmonary venous congestion. No fractures on lumbar imaging. Patient did complain of right upper quadrant abdominal pain. Patient was very anxious in the emergency department received IM hydroxyzine. Patient was redirectable briefly however rapid response was called as patient would not keep her nonrebreather on and became hypoxic with an O2 saturation of 75 to 80% and was combative with staff. Upon my examination patient was pulling off nonrebreather and initial plan was for intubation. However patient was redirected and placed in restraints and calm down with nonrebreather on and oxygen saturation in the 90s. PAST MEDICAL HISTORY:         Diagnosis Date    Acute respiratory failure with hypoxia ([address] 10/16/2020    Alcohol withdrawal syndrome, with delirium ([address] 12/14/2019    Alcoholism ([address]     Anal warts     Anemia 10/2020    GI bleed    Anxiety     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24, the patient underwent 2 surgical resections without known recurrence 10/23/2020    at age 24    Bandemia     Closed fracture of lateral portion of left tibial plateau 66/12/7084    Condyloma     COPD (chronic obstructive pulmonary disease) ([address]     CO2 retainer, on Bipap at night for this, Dr. Judene Carrel ( last visit 11/20/2020 and note on chart )    Depression      major depressive disorder, ptsd, anxiety    Dysphagia     GI bleed 10/2020    Hypertension     Memory loss     Oxygen dependent     USES  3L/MIN DAILY PRN AND NIGHTLY CONTINUOUSLY    Pain, joint, ankle and foot     Pancreatic lesion 10/2020    Dr. Gayle Gutierrez working up pt    Perianal wart     Peripheral neuropathy     Seizures ([address]     LAST SEIZURE 3/2020 - FEELS IT WAS FROM ALCOHOL WITHDRAWL    Tension headache     Under care of team 09/29/2021    neuro-Dr Coyle-[facility] ct-last visit sep 2021    Under care of team 09/29/2021    pain management-Hamzah jimenez-last visit sep 2021    Under care of team     pulmonology-Dr Dueñas-[facility]-due for visit March 2022    Under care of team 09/29/2021    psych-bahnfeldt NP-telemed-last visit 10/ 2021    Under care of team 09/29/2021    il-[address]-last visit aug 2021    Under care of team     NEPHROLOGY - DR. LEE    Wears glasses     Wears partial dentures     UPPER    Wellness examination     pcp-Ludivina grimes-last visit Jan 5, 2022         PAST SURGICAL HISTORY:         Procedure Laterality Date    ANUS SURGERY N/A 01/25/2022    EXCISION AND FULGURATION, ANAL, VAGINAL AND PERIANAL WARTS WITH CO2 LASER, FORTEC performed by Lizeth Haddad MD at [place] [address]    COLONOSCOPY N/A 10/22/2020    COLONOSCOPY DIAGNOSTIC performed by Sonal Cobian MD at [facility]    COLONOSCOPY N/A 11/19/2021    COLONOSCOPY W/ ENDOSCOPIC MUCOSAL RESECTION performed by Sonal Cobian MD at [facility]    [address]  07/28/2021    CT ABSCESS DRAIN SUBCUTANEOUS 7/28/2021 [facility] CT SCAN    ENDOSCOPIC ULTRASOUND (LOWER) N/A 12/09/2020    ENDOSCOPIC ULTRASOUND, UPPER WITH LINEAR SCOPE FOR BIOPSY OF MASS ON HEAD OF PANCREAS performed by Cinda Hooker MD at [facility] (UPPER)  02/09/2022    ENDOSCOPIC ULTRASOUND, EGD - GI SCHEDULED,EGD STENT REMOVAL    ERCP N/A 08/11/2021    ERCP STENT INSERTION performed by Laureano Saldaña MD at [facility]    ERCP  08/11/2021    ERCP SPHINCTER/PAPILLOTOMY performed by Laureano Saldaña MD at [facility]    ERCP N/A 10/21/2021    ERCP STENT REMOVAL/EXCHANGE performed by Laureano Saldaña MD at [place] [address]    ERCP  10/21/2021    ERCP STONE REMOVAL performed by Laureano Saldaña MD at [place] [address]    ERCP  10/21/2021    ERCP ENDOSCOPIC RETROGRADE CHOLANGIOPANCREATOGRAPHY DIRECT VISUALIZATION performed by Laureano Saldaña MD at [address] 07/03/2013    ORIF tibial plateau    FRACTURE SURGERY Right     small finger metacarpal fracture    HAND SURGERY      HYSTERECTOMY (CERVIX STATUS UNKNOWN)  2003    SPINE SURGERY N/A 09/10/2021    KYPHOPLASTY lumbar L5 performed by Bárbara Jimenez MD at [facility] 10/22/2020    EGD BIOPSY performed by Sonal Cobian MD at [address] 04/05/2021    EGD BIOPSY performed by Sonal Cobian MD at [facility] [address] 09/08/2021    ENDOSCOPIC ULTRASOUND, LINEAR SCOPE performed by Aaron Angeles MD at [address] [facility] 2/9/2022    ENDOSCOPIC ULTRASOUND, EGD - GI SCHEDULED performed by Luís Wong MD at [address] [facility]  2/9/2022    EGD STENT REMOVAL performed by Luís Wong MD at [address]:      No Known Allergies      HOME MEDS: :      Prior to Admission medications    Medication Sig Start Date End Date Taking? Authorizing Provider   lipase-protease-amylase (CREON) 11400-51015 units delayed release capsule TAKE ONE CAPSULE BY MOUTH THREE TIMES A DAY WITH A MEAL 12/19/22   Ludivina Sifuentes MD   baclofen (LIORESAL) 10 MG tablet Take 1 tablet by mouth 3 times daily 12/14/22   Ludivina Sifuentes MD   metoclopramide (REGLAN) 5 MG tablet TAKE ONE TABLET BY MOUTH THREE TIMES A DAY 11/15/22   Jordyn Lara MD   fluticasone-umeclidin-vilant (TRELEGY ELLIPTA) 484-45.5-21 MCG/INH AEPB Inhale 1 puff into the lungs daily 11/1/22   LOI Espinal CNP   pregabalin (LYRICA) 200 MG capsule Take 1 capsule by mouth nightly for 30 days.  11/1/22 12/1/22  LOI Espinal CNP   albuterol sulfate HFA (VENTOLIN HFA) 108 (90 Base) MCG/ACT inhaler Inhale 2 puffs into the lungs 4 times daily as needed for Wheezing 11/1/22   LOI Espinal CNP   amLODIPine (NORVASC) 10 MG tablet TAKE ONE TABLET BY MOUTH DAILY 10/17/22   Ludivina Sifuentes MD   verapamil (CALAN SR) 120 MG extended release tablet Take 1 tablet by mouth daily 10/13/22   LOI Hogue CNP   varenicline (CHANTIX) 1 MG tablet TAKE ONE TABLET BY MOUTH TWICE A DAY (START AFTER FINISHING TAPER) 10/3/22   Ludivina Sifuentes MD   hydrOXYzine HCl (ATARAX) 50 MG tablet  9/13/22   Historical Provider, MD   omeprazole (PRILOSEC) 40 MG delayed release capsule Take 1 capsule by mouth in the morning and at bedtime 9/19/22   Jordyn Lara MD   sucralfate (CARAFATE) 1 GM tablet Take 1 tablet by mouth 4 times daily 9/19/22   Abdullahi Mixon MD   atorvastatin (LIPITOR) 20 MG tablet Take 1 tablet by mouth daily 9/6/22   LOI Gilbert CNP   mirtazapine (REMERON) 15 MG tablet  7/21/22   Elizabeth Dey   REXULTI 1 MG TABS tablet  8/9/22   Elizabeth Dey   topiramate (TOPAMAX) 50 MG tablet Take 1 tablet by mouth 2 times daily 8/29/22   LOI Gilbert CNP   busPIRone (BUSPAR) 30 MG tablet Take 1 tablet by mouth in the morning and at bedtime 7/21/22   Elizabeth Dey   solifenacin (VESICARE) 10 MG tablet Take 1 tablet by mouth in the morning. 7/20/22   Xiomara MASTERS MD   rOPINIRole (REQUIP) 1 MG tablet TAKE ONE TABLET BY MOUTH ONCE NIGHTLY 7/11/22   Ashley Bella APRN - NP   carBAMazepine (TEGRETOL) 200 MG tablet TAKE ONE TABLET BY MOUTH THREE TIMES A DAY 7/11/22   Ashley Bella APRN - JW   Handicap Placard MISC by Does not apply route Expires 5/2027 5/9/22   Ludivina Mccrary MD   prazosin (MINIPRESS) 1 MG capsule Take 1 capsule by mouth nightly 5/4/22   Ashley Bella APRN - JW   nicotine polacrilex (NICOTINE MINI) 4 MG lozenge Take 1 lozenge by mouth as needed for Smoking cessation Weeks 1 to 6: 1 lozenge every 1 to 2 hours. Weeks 7 to 9: 1 lozenge every 2 to 4 hours. Weeks 10 to 12: 1 lozenge every 4 to 8 hours. Maximum 20 lozenges per day. 4/7/22   Ashley Bella, APRN - NP   sodium chloride 1 g tablet Take 1 tablet by mouth 4 times daily Note increase in dose per Dr Daphney Forbes 4/4/22   Barby Martin MD   senna (SENOKOT) 8.6 MG tablet Take 1 tablet by mouth 2 times daily 1/25/22 1/25/23  Ge Mcdermott, DO   lidocaine (XYLOCAINE) 5 % ointment Apply topically as needed.  1/25/22   Ge Mcdermott, DO   simethicone (MYLICON) 80 MG chewable tablet Take 1 tablet by mouth 4 times daily as needed for Flatulence 8/27/21   Abdullahi Mixon MD   vitamin D (ERGOCALCIFEROL) 04472 units CAPS capsule Take 1 capsule by mouth once a week 6/19/19   Ludivina Mccrary MD   folic acid (FOLVITE) 1 MG tablet Take 1 tablet by mouth daily 19   Ludivina Cobb MD       SOCIAL HISTORY:       TOBACCO:   reports that she has been smoking cigarettes. She has a 30.00 pack-year smoking history. She has never used smokeless tobacco.  ETOH:   reports that she does not currently use alcohol. DRUGS:  reports current drug use. Frequency: 2.00 times per week.      FAMILY HISTORY:          Problem Relation Age of Onset    Other Father     Cancer Maternal Grandmother     Heart Disease Paternal Grandmother     Esophageal Cancer Maternal Aunt     Arthritis Mother        REVIEW OF SYSTEMS (ROS):     Unable to obtain due to mentation    OBJECTIVE:     VITAL SIGNS:  BP (!) 109/92   Pulse 81   Temp 97.7 °F (36.5 °C) (Oral)   Resp 25   SpO2 90%   Tmax over 24 hours:  Temp (24hrs), Av.1 °F (36.7 °C), Min:97.7 °F (36.5 °C), Max:98.4 °F (36.9 °C)      Patient Vitals for the past 8 hrs:   BP Temp Temp src Pulse Resp SpO2   23 1821 (!) 109/92 97.7 °F (36.5 °C) Oral 81 25 90 %   23 1801 114/88 -- -- 82 20 100 %   23 1746 109/79 -- -- 85 25 --   23 1731 89/67 -- -- 83 25 100 %   23 1716 94/73 -- -- 81 24 96 %   23 1701 85/68 -- -- 86 20 95 %   23 1645 98/78 -- -- 83 22 94 %   23 1642 93/75 -- -- 87 26 (!) 85 %   23 1627 100/82 -- -- 84 24 (!) 88 %   23 1612 91/70 -- -- 85 15 (!) 84 %   23 1550 -- -- -- -- -- 97 %   23 1357 -- 98.4 °F (36.9 °C) Oral -- -- --   23 1330 112/75 -- -- 90 26 96 %   23 1327 -- -- -- 90 30 99 %   23 1315 93/70 -- -- 88 (!) 33 97 %   23 1311 109/65 -- -- 89 21 --   23 1307 109/65 -- -- 100 15 99 %   23 1305 -- -- -- -- -- 100 %   23 1303 -- -- -- -- (!) 47 (!) 42 %         Intake/Output Summary (Last 24 hours) at 2023 1844  Last data filed at 2023 1604  Gross per 24 hour   Intake 1050 ml   Output --   Net 1050 ml     Date 23 0000 - 23 2359   Shift 0691-5002 [id number] 8799-2407 24 Hour Total   INTAKE   IV Piggyback   1050 1050   Shift Total   1050 1050   OUTPUT   Shift Total       Weight (kg)         Wt Readings from Last 3 Encounters:   11/01/22 152 lb (68.9 kg)   09/01/22 146 lb 9.6 oz (66.5 kg)   08/29/22 152 lb (68.9 kg)     There is no height or weight on file to calculate BMI. PHYSICAL EXAM:  Constitutional: Difficult to assess due to mentation. Patient agitated and combative. EENT: neck supple with midline trachea. Neck: Supple, symmetrical, trachea midline, no adenopathy,  no jvd, skin normal  Respiratory: clear to auscultation, no wheezes or rales and unlabored breathing.  No intercostal tenderness  Cardiovascular: regular rate and rhythm, normal S1, S2, no murmur noted  Abdomen: soft, right upper quadrant tenderness, nondistended, no masses or organomegaly  Extremities:   no pedal edema, no clubbing or cyanosis    MEDICATIONS:  Scheduled Meds:   piperacillin-tazobactam  3,375 mg IntraVENous Q8H     Continuous Infusions:   dexmedetomidine       PRN Meds:   ipratropium-albuterol, 1 ampule, Q4H PRN          ABGs:   Lab Results   Component Value Date/Time    [id number] NOT REPORTED 07/26/2021 03:38 PM    FIO2 100.0 01/01/2023 05:19 PM       DATA:  Complete Blood Count:   Recent Labs     01/01/23  1312   WBC 17.5*   RBC 3.86*   HGB 11.2*   HCT 35.3*   MCV 91.5   MCH 29.0   MCHC 31.7   RDW 16.4*      MPV 10.6        Last 3 Blood Glucose:   Recent Labs     01/01/23  1312   GLUCOSE 121*        PT/INR:    Lab Results   Component Value Date/Time    PROTIME 11.1 01/01/2023 01:12 PM    INR 1.0 01/01/2023 01:12 PM     PTT:    Lab Results   Component Value Date/Time    APTT 25.8 01/01/2023 01:12 PM       Comprehensive Metabolic Profile:   Recent Labs     01/01/23  1312 01/01/23  1316     --    K 5.3  --      --    CO2 18*  --    BUN 37*  --    CREATININE 0.69 0.71   GLUCOSE 121*  --    CALCIUM 6.8*  --    PROT 6.6  --    LABALBU 3.1*  --    BILITOT 0.7  --    ALKPHOS 197*  -- AST 67*  --    ALT 19  --       Magnesium:   Lab Results   Component Value Date/Time    MG 2.2 01/01/2023 01:12 PM    MG 1.6 10/12/2021 05:02 PM    MG 1.6 10/11/2021 03:25 PM     Phosphorus:   Lab Results   Component Value Date/Time    PHOS 3.7 07/23/2021 06:29 AM    PHOS 2.5 10/27/2019 08:33 PM    PHOS 4.0 07/14/2019 05:29 AM     Ionized Calcium:   Lab Results   Component Value Date/Time    CAION 0.93 01/01/2023 03:05 PM    CAION 1.30 07/22/2021 03:22 AM    CAION 1.42 10/25/2020 09:03 AM        Urinalysis:   Lab Results   Component Value Date/Time    NITRU NEGATIVE 01/01/2023 01:54 PM    COLORU DARK YELLOW 01/01/2023 01:54 PM    PHUR 5.5 01/01/2023 01:54 PM    WBCUA 2 TO 5 01/01/2023 01:54 PM    RBCUA 0 TO 2 01/01/2023 01:54 PM    MUCUS 2+ 09/01/2022 11:50 AM    TRICHOMONAS NOT REPORTED 10/12/2021 04:15 PM    YEAST MANY 01/01/2023 01:54 PM    BACTERIA MODERATE 01/01/2023 01:54 PM    CLARITYU cloudy 12/18/2018 03:46 PM    SPECGRAV 1.020 01/01/2023 01:54 PM    LEUKOCYTESUR SMALL 01/01/2023 01:54 PM    UROBILINOGEN ELEVATED 01/01/2023 01:54 PM    BILIRUBINUR NEGATIVE  Verified by ictotest. 01/01/2023 01:54 PM    BILIRUBINUR moderate 12/18/2018 03:46 PM    BLOODU neg 12/18/2018 03:46 PM    GLUCOSEU NEGATIVE 01/01/2023 01:54 PM    KETUA SMALL 01/01/2023 01:54 PM    AMORPHOUS 2+ 09/01/2022 11:50 AM       HgBA1c:    Lab Results   Component Value Date/Time    LABA1C 5.5 04/13/2021 11:34 AM     TSH:    Lab Results   Component Value Date/Time    TSH 0.56 01/01/2023 01:12 PM       Lactic Acid: 3.0 trended down to 1.6  Troponin: 26 > 25      Radiological imaging  CT HEAD WO CONTRAST    Result Date: 1/1/2023  EXAMINATION: CT OF THE HEAD WITHOUT CONTRAST  1/1/2023 1:33 pm TECHNIQUE: CT of the head was performed without the administration of intravenous contrast. Automated exposure control, iterative reconstruction, and/or weight based adjustment of the mA/kV was utilized to reduce the radiation dose to as low as reasonably achievable. COMPARISON: MRI brain 12/31/2021. CT brain 07/25/2021. HISTORY: ORDERING SYSTEM PROVIDED HISTORY: AMS, fall TECHNOLOGIST PROVIDED HISTORY: AMS, fall Decision Support Exception - unselect if not a suspected or confirmed emergency medical condition->Emergency Medical Condition (MA) Is the patient pregnant?->No FINDINGS: BRAIN/VENTRICLES: There is no acute intracranial hemorrhage, mass effect or midline shift. No abnormal extra-axial fluid collection. The gray-white differentiation is maintained without evidence of an acute infarct. There is no evidence of hydrocephalus. ORBITS: The visualized portion of the orbits demonstrate no acute abnormality. SINUSES: The visualized paranasal sinuses and mastoid air cells demonstrate no acute abnormality. SOFT TISSUES/SKULL:  Status post suboccipital craniectomy. No calvarial fracture. No acute intracranial abnormality. CT CERVICAL SPINE WO CONTRAST    Result Date: 1/1/2023  EXAMINATION: CT OF THE CERVICAL SPINE WITHOUT CONTRAST 1/1/2023 1:33 pm TECHNIQUE: CT of the cervical spine was performed without the administration of intravenous contrast. Multiplanar reformatted images are provided for review. Automated exposure control, iterative reconstruction, and/or weight based adjustment of the mA/kV was utilized to reduce the radiation dose to as low as reasonably achievable. COMPARISON: 12/13/2019 HISTORY: ORDERING SYSTEM PROVIDED HISTORY: Fall TECHNOLOGIST PROVIDED HISTORY: Fall Decision Support Exception - unselect if not a suspected or confirmed emergency medical condition->Emergency Medical Condition (MA) Is the patient pregnant?->No FINDINGS: BONES/ALIGNMENT: There is motion artifact which degrades evaluation for subtle nondisplaced fractures. However, the vertebral bodies demonstrate normal height and alignment. No evidence of an acute traumatic displaced fracture. Onto process intact. Lateral masses are symmetric.  DEGENERATIVE CHANGES: Mild multilevel degenerative changes most prominent at C5-6. SOFT TISSUES: No prevertebral soft tissue swelling. Lung apices demonstrate no acute abnormality. No acute traumatic injury of the cervical spine. CT ABDOMEN PELVIS W IV CONTRAST Additional Contrast? None    Result Date: 1/1/2023  EXAMINATION: CT OF THE ABDOMEN AND PELVIS WITH CONTRAST; CT OF THE LUMBAR SPINE WITHOUT CONTRAST 1/1/2023 1:33 pm TECHNIQUE: CT of the abdomen and pelvis was performed with the administration of intravenous contrast. Multiplanar reformatted images are provided for review. Automated exposure control, iterative reconstruction, and/or weight based adjustment of the mA/kV was utilized to reduce the radiation dose to as low as reasonably achievable.; CT of the lumbar spine was performed without the administration of intravenous contrast. Multiplanar reformatted images are provided for review. Adjustment of mA and/or kV according to patient size was utilized. Automated exposure control, iterative reconstruction, and/or weight based adjustment of the mA/kV was utilized to reduce the radiation dose to as low as reasonably achievable. COMPARISON: 09/09/2021 HISTORY: ORDERING SYSTEM PROVIDED HISTORY: Fall, AMS TECHNOLOGIST PROVIDED HISTORY: Fall, AMS Decision Support Exception - unselect if not a suspected or confirmed emergency medical condition->Emergency Medical Condition (MA) FINDINGS: Abdomen/pelvis: Lower Chest: See separately dictated chest CT report. Multifocal ground-glass airspace disease noted throughout the lung bases suggesting an atypical pneumonia. Organs: Liver enhances normally without evidence of intrahepatic biliary ductal dilatation. There is gallbladder wall thickening with pericholecystic fluid. Findings can be seen with acute cholecystitis in the proper clinical setting. There is mild periportal edema. No focal hepatic mass.   Extensive pancreatic calcifications with slight irregularity of the pancreatic duct suggesting sequela of chronic pancreatitis. No splenomegaly or focal splenic lesion. No focal adrenal nodule. The kidneys enhance symmetrically without evidence of hydronephrosis. There is a nonobstructing 4 mm stone in the mid pole of the left kidney. GI/Bowel: Stomach and duodenal sweep demonstrate no acute abnormality. The appendix is visualized and is unremarkable. No evidence of acute appendicitis. There is no evidence of bowel obstruction. No evidence of abnormal bowel wall thickening or distension. Pelvis: Bladder is unremarkable. Status post hysterectomy. Peritoneum/Retroperitoneum: No evidence of ascites or free air. No evidence of lymphadenopathy. Aorta is normal in caliber. Moderate atherosclerotic calcifications of the aorta and branch vessels. Bones/Soft Tissues: Pelvic ring is intact. No evidence of an acute traumatic displaced fracture. Lumbar spine: Since prior examination there has been kyphoplasty of the previously seen L5 compression fracture. There are chronic T11 and T12 compression fractures that are stable since prior examination. There is a T9 compression fracture unchanged since prior 11/09/2022 chest CT. The L1 compression fracture is new since 2021 but unchanged since 11/09/2022 chest CT. There is also subtle inferior endplate compression fracture of L2. There is no significant loss of vertebral body height of L2. There is approximately 20% loss of vertebral body height of L1. The spinous and transverse processes are intact. Normal alignment. No paraspinal mass or hematoma. Findings suggesting stable subacute to chronic superior endplate compression fracture of L1 with approximately 20% loss of vertebral body height. This is seen on prior chest CT. Mild acute inferior endplate compression fracture of L2 without significant loss of vertebral body height. This is new since prior 09/09/2021 CT but is age indeterminate.   Given history of recent trauma, these may be acute. Distended gallbladder with wall thickening and pericholecystic fluid. This is nonspecific but can be seen with acute cholecystitis in the proper clinical setting. Incidentally noted nonobstructing stone in the mid pole left kidney. Evidence of remote pancreatitis. Findings suggesting atypical pneumonia in the lung bases. Please see separate chest CT. XR CHEST PORTABLE    Result Date: 1/1/2023  EXAMINATION: ONE XRAY VIEW OF THE CHEST 1/1/2023 1:31 pm COMPARISON: 9 August 2021 HISTORY: ORDERING SYSTEM PROVIDED HISTORY: SOB, hypoxic TECHNOLOGIST PROVIDED HISTORY: SOB, hypoxic FINDINGS: AP portable view of the chest time stamped at 1326 hours demonstrates overlying cardiac monitoring electrodes. Heart size is normal.  Diffuse interstitial opacities are noted with more alveolar/consolidative appearance at the bases. No extrapleural air. Diffuse infiltrates with differential diagnosis to include multifocal pneumonitis versus interstitial edema. CT CHEST PULMONARY EMBOLISM W CONTRAST    Result Date: 1/1/2023  EXAMINATION: CTA OF THE CHEST, 1/1/2023 1:33 pm TECHNIQUE: CTA of the chest was performed after the administration of intravenous contrast.  Multiplanar reformatted images are provided for review. MIP images are provided for review. Automated exposure control, iterative reconstruction, and/or weight based adjustment of the mA/kV was utilized to reduce the radiation dose to as low as reasonably achievable. COMPARISON: 9 November 2022 HISTORY: ORDERING SYSTEM PROVIDED HISTORY: SOB, hypoxia, fall TECHNOLOGIST PROVIDED HISTORY: SOB, hypoxia, fall Decision Support Exception - unselect if not a suspected or confirmed emergency medical condition->Emergency Medical Condition (MA) FINDINGS: Pulmonary Arteries:  Respiratory motion creates artifact. Adequate opacification of the pulmonary arteries is noted. No filling defects are noted in the pulmonary arteries to suggest embolus.   Main pulmonary artery normal in size. Mediastinum: No acute aortic abnormality. Enlarged right hilar lymph node. Other lymph nodes are shotty. Mild to moderate cardiomegaly is noted without pericardial effusion or epicardial adenopathy. Lungs/Pleura:  Ground-glass background opacity is distributed diffusely but heterogeneously in a mosaic type pattern. Vascular markings are hazy. Appearance suggest pulmonary venous congestion. No effusion or extrapleural air is noted. Tracheobronchial tree is patent. Esophagus is patulous from the hilar area to the EG junction. Upper Abdomen:  Venous enhancement in the liver is noted suggesting congestive change. Calcifications are noted in the pancreas suggesting chronic pancreatitis. Liver appears fatty. Although incompletely included in the field of view, mild hepatomegaly is suspected. Soft Tissues/Bones: No acute subcutaneous soft tissue abnormality. Multilevel degenerative changes in the spine. Patient has osteopenia with nonacute significant central compression T5, superior endplate depression with Schmorl's node T7 and T8, superior endplate compression C06 and T11 and endplate depression Z17 with sclerosis suggesting subacute etiology. 1. No evidence of pulmonary embolism. 2. Hazy vascular appearance with diffuse ground-glass background parenchymal pattern with mosaic type distribution suggesting pulmonary venous congestion. Additionally, there is venous enhancement of the liver suggesting secondary congestive change. Overall appearance suggest pulmonary edema. 3. Fatty liver. Although incompletely included in the field of view suspect hepatomegaly. 4. Pancreatic calcifications suggesting chronic pancreatitis. 5. Enlarged right hilar lymph node.      CT LUMBAR SPINE TRAUMA RECONSTRUCTION    Result Date: 1/1/2023  EXAMINATION: CT OF THE ABDOMEN AND PELVIS WITH CONTRAST; CT OF THE LUMBAR SPINE WITHOUT CONTRAST 1/1/2023 1:33 pm TECHNIQUE: CT of the abdomen and pelvis was performed with the administration of intravenous contrast. Multiplanar reformatted images are provided for review. Automated exposure control, iterative reconstruction, and/or weight based adjustment of the mA/kV was utilized to reduce the radiation dose to as low as reasonably achievable.; CT of the lumbar spine was performed without the administration of intravenous contrast. Multiplanar reformatted images are provided for review. Adjustment of mA and/or kV according to patient size was utilized. Automated exposure control, iterative reconstruction, and/or weight based adjustment of the mA/kV was utilized to reduce the radiation dose to as low as reasonably achievable. COMPARISON: 09/09/2021 HISTORY: ORDERING SYSTEM PROVIDED HISTORY: Fall, AMS TECHNOLOGIST PROVIDED HISTORY: Fall, AMS Decision Support Exception - unselect if not a suspected or confirmed emergency medical condition->Emergency Medical Condition (MA) FINDINGS: Abdomen/pelvis: Lower Chest: See separately dictated chest CT report. Multifocal ground-glass airspace disease noted throughout the lung bases suggesting an atypical pneumonia. Organs: Liver enhances normally without evidence of intrahepatic biliary ductal dilatation. There is gallbladder wall thickening with pericholecystic fluid. Findings can be seen with acute cholecystitis in the proper clinical setting. There is mild periportal edema. No focal hepatic mass. Extensive pancreatic calcifications with slight irregularity of the pancreatic duct suggesting sequela of chronic pancreatitis. No splenomegaly or focal splenic lesion. No focal adrenal nodule. The kidneys enhance symmetrically without evidence of hydronephrosis. There is a nonobstructing 4 mm stone in the mid pole of the left kidney. GI/Bowel: Stomach and duodenal sweep demonstrate no acute abnormality. The appendix is visualized and is unremarkable. No evidence of acute appendicitis. There is no evidence of bowel obstruction.   No evidence of abnormal bowel wall thickening or distension. Pelvis: Bladder is unremarkable. Status post hysterectomy. Peritoneum/Retroperitoneum: No evidence of ascites or free air. No evidence of lymphadenopathy. Aorta is normal in caliber. Moderate atherosclerotic calcifications of the aorta and branch vessels. Bones/Soft Tissues: Pelvic ring is intact. No evidence of an acute traumatic displaced fracture. Lumbar spine: Since prior examination there has been kyphoplasty of the previously seen L5 compression fracture. There are chronic T11 and T12 compression fractures that are stable since prior examination. There is a T9 compression fracture unchanged since prior 11/09/2022 chest CT. The L1 compression fracture is new since 2021 but unchanged since 11/09/2022 chest CT. There is also subtle inferior endplate compression fracture of L2. There is no significant loss of vertebral body height of L2. There is approximately 20% loss of vertebral body height of L1. The spinous and transverse processes are intact. Normal alignment. No paraspinal mass or hematoma. Findings suggesting stable subacute to chronic superior endplate compression fracture of L1 with approximately 20% loss of vertebral body height. This is seen on prior chest CT. Mild acute inferior endplate compression fracture of L2 without significant loss of vertebral body height. This is new since prior 09/09/2021 CT but is age indeterminate. Given history of recent trauma, these may be acute. Distended gallbladder with wall thickening and pericholecystic fluid. This is nonspecific but can be seen with acute cholecystitis in the proper clinical setting. Incidentally noted nonobstructing stone in the mid pole left kidney. Evidence of remote pancreatitis. Findings suggesting atypical pneumonia in the lung bases. Please see separate chest CT.      CT THORACIC SPINE TRAUMA RECONSTRUCTION    Result Date: 1/1/2023  EXAMINATION: CT OF THE THORACIC SPINE WITHOUT CONTRAST  1/1/2023 1:33 pm: TECHNIQUE: CT of the thoracic spine was performed without the administration of intravenous contrast. Multiplanar reformatted images are provided for review. Automated exposure control, iterative reconstruction, and/or weight based adjustment of the mA/kV was utilized to reduce the radiation dose to as low as reasonably achievable. COMPARISON: 07/21/2021 CT thoracic spine. 11/09/2022 CT lung cancer screening. HISTORY: ORDERING SYSTEM PROVIDED HISTORY: Fall TECHNOLOGIST PROVIDED HISTORY: Fall Is the patient pregnant?->No FINDINGS: BONES/ALIGNMENT: There are compression fractures of T12, T11, T9, T8 unchanged since prior examination along the superior endplates. There is a burst compression fracture of T6 unchanged since prior examination. No new compression fractures or fractures of the thoracic spine compared to prior CT chest.  The spinous and transverse processes are intact. DEGENERATIVE CHANGES: Mild multilevel disc degenerative changes. SOFT TISSUES: No paraspinal mass or hematoma. Please see separately dictated chest CT for discussion of those findings. Chronic T8, T9, T11 and T12 compression fractures. Chronic burst compression fracture of T6. No significant change since prior chest CT. No new fractures of the thoracic spine. ASSESSMENT:     Principal Problem:    Acute on chronic respiratory failure with hypoxia (HCC)  Active Problems:    Metabolic encephalopathy  Resolved Problems:    * No resolved hospital problems. *      PLAN:   Acute on chronic respiratory failure with hypoxia  -Likely multifactorial  -Patient wears 4 L of home oxygen due to COPD and has been noncompliant  -Plan was for intubation in the emergency department patient improved after being restrained.   My suspicion is she will likely need to be intubated due to agitation if agitation continues  -We will continue bronchodilators, steroids, incentive spirometer and Acapella  -Received ceftriaxone and Zithromax in the emergency department  -Continue with Zosyn  -Started on Precedex drip    Metabolic encephalopathy  -Likely multifactorial  -Possible sepsis with lactic acidosis, UTI or cholecystitis are possible causes   -CT abdomen reviewed showing pericholecystic fluid  -Patient initially received ceftriaxone and azithromycin in the emergency department we will continue with Zosyn    Prolonged QTc  -Avoid Qtc prolonging medications  -Received Haldol x2 in the emergency department    History of anxiety per mother  -According to mother patient has not been taking her medications  -On Atarax at home   -Precedex drip    History of alcohol abuse with chronic pancreatitis  -Follow-up on ethanol level  -Ammonia 69  -Lactulose 20 g 3 times daily    Right upper quadrant abdominal pain > possible cholecystitis  -Right upper quadrant ultrasound    COPD  -Continue with albuterol  -Continue with Trelegy Ellipta  -Goal O2 saturation 88 to 92%    Hypertension  -Blood pressures been on the softer side  -Lisinopril 5 mg will hold if blood pressures remain on the lower side    Seizure disorder  -Continue carbamazepine  -Monitor for seizure-like activity    Jessie Choudhary MD  Critical Care Resident   [facility]  1/1/2023 6:44 PM    The critical care team assigned to the patient will be following up the patient in the intensive care unit. I have discussed the current plan with the critical care attending. The above mentioned assessment and plan will be reviewed again in detail by the critical care attending at bedside, and can be further changed or modified accordingly by the attending physician.

## 2023-01-01 NOTE — ED NOTES
Pt brought back to ed via triage at this time, writer and Monie RN entered room. Placed pt in gown and placed on monitor, patient inital pulse ox of 42% on room air with very clear signs of shortness of breath, tachypnea, broken speech, dusky lips, circumoral cyanosis, blue nail beds. ED resident/attending called to bedside with RT. Pt brought to ed with mom, reports she found her like this at home, lives alone. Patient reports being sick since Thursday. Patient has been sick all month, diarrhea ongoing, belly pain. Vomiting, chest pain, coughing, reports not taking medications all med. Patient is fatigued and answering some questions. Patient is able to answer orientation questions is alert and oriented to time, place, circumstance, but itermittently confused. Patient has dried stool to abdomen.         Sahara Atwood RN  01/01/23 1400

## 2023-01-01 NOTE — ED NOTES
Admitting team paged, requesting ethanol add on, patient has hx of alcohol abuse and altered. Unaware if it's due to etoh or mentation decrease due to hypoxia.       Tamanna Hyatt RN  01/01/23 2076

## 2023-01-01 NOTE — ED NOTES
Pt states to writer \"you're a smart ass\" writer entered room, patient was sitting upright and her mom was at the side of the bed, patient asked what she was doing. States she was uncomfortable. Patient notified she does not need to verbally abuse medical staff.       Jeffrey Larson RN  01/01/23 [id number]

## 2023-01-01 NOTE — ED NOTES
Patient states \"you're the worst nurse ever\" patient is being verbally aggressive with staff and redirected to keep the NRB on her face due to hypoxia.       Peter Malcolm RN  01/01/23 [id number]

## 2023-01-01 NOTE — ED NOTES
writer entered room, patient removed NRB. Oxygen level decreased to 80% on room air very rapidly with cyanosis. Patient redirected to put mask back on face. Sitter requested for bedside.            Damion Pierce RN  01/01/23 0173

## 2023-01-01 NOTE — ED NOTES
Patient removed NRB, removed self from monitor, threatening medical staff, stating she wants nicotine and cigarettes. Attempting to remove ivs, patient confused and asking who staff members in room are. Patient is redirected. Patient not compliant and refusing. Rat team at bedside for intubation of patient with house supervisor Nicky Hernandez. Patient is balling up her fists and yelling at medical staff.       Sahara Atwood RN  01/01/23 [id number]

## 2023-01-01 NOTE — ED PROVIDER NOTES
[facility] ED     Emergency Department     Faculty Attestation        I performed a history and physical examination of the patient and discussed management with the resident. I reviewed the residents note and agree with the documented findings and plan of care. Any areas of disagreement are noted on the chart. I was personally present for the key portions of any procedures. I have documented in the chart those procedures where I was not present during the key portions. I have reviewed the emergency nurses triage note. I agree with the chief complaint, past medical history, past surgical history, allergies, medications, social and family history as documented unless otherwise noted below. For Physician Assistant/ Nurse Practitioner cases/documentation I have personally evaluated this patient and have completed at least one if not all key elements of the E/M (history, physical exam, and MDM). Additional findings are as noted. PCP:  Florina Hall MD    Pertinent Comments:     Patient is a 58-year-old female with multiple medical problems including COPD and being a chronic CO2 retainer as well as oxygen dependent. Significant also history of alcoholism and pancreatitis. Approximately 2 to 3 weeks ago had viral-like illness and has been worsening since. Mother states she was somewhat confused 2 to 3 days ago when she saw her however believed her just to be \"drunk at that time\". Saw her today and things were clearly worsening with worsening breathing and brought her immediately in. Here patient initially having saturations of 70% on room air requiring nonrebreather mask. There is some subtle wheezing but also rhonchorous breath sounds bilaterally. Some mild confusion as well and apparently the patient has been falling several times at home. There is no midline back pain or neck pain however patient is mildly confused.    Moving all extremities with relatively good strength and no obvious deficit or facial asymmetry. Pupils are equal regular reactive to light as well. Assessment/plan: Patient with some confusion as well as hypoxia and relatively recent viral syndrome. Overall patient appears dry with no obvious swelling and dry mucous membranes that are very dry and tacky. We will begin with IV fluid hydration as well as continued oxygen therapy and a septic and cardiac work-up. Also CT of the head/C-spine/chest/abdomen/pelvis with recons of the spine. PE protocol given hypoxia. Admission after    Patient with improved oxygenation on nonrebreather and breathing treatment. Alcohol level is negative and does have JALEEL likely prerenal given BUN/creatinine of 37/0. 69. Patient clinically appears very dry/dehydrated with no peripheral edema. Initial VBG shows a mixed mild respiratory and metabolic acidosis. Repeat VBG shows essentially the same with no worsening of PCO2 retention. When patient is oxygenating well appears to be alert and oriented x3 and intubation discussion with resident and patient as well as family was done. Patient wishes to hold off on any sort of intubation unless absolutely needed as a last resort. Has had 2 episodes of vomiting making a slurry to start BiPAP but currently oxygenating well when wearing NRB and no PCO2 retention currently. CT head read as negative and CT chest negative for PE but does have edema versus atypical infiltrates on reading of CT chest.   CT abdomen/pelvis reads as likely atypical pneumonia and given clinical picture with no obvious volume overload on exam currently we will treat as pneumonia. Will continue to watch respiratory status closely.           EKG Interpretation    Interpreted by emergency department physician    Rhythm: normal sinus   Rate: normal at 87 bpm  Axis: normal  Conduction: Incomplete right bundle branch block  ST Segments: no acute change  T Waves: Anterior T wave inversions  Q Waves: no acute change    Clinical Impression:  nonspecific EKG. CRITICAL CARE: There was a high probability of clinically significant/life threatening deterioration in this patient's condition which required my urgent intervention. Total critical care time was 30 minutes. This excludes any time for separately reportable procedures. (Please note that portions of this note were completed with a voice recognition program. Efforts were made to edit the dictations but occasionally words are mis-transcribed.  Whenever words are used in this note in any gender, they shall be construed as though they were used in the gender appropriate to the circumstances; and whenever words are used in this note in the singular or plural form, they shall be construed as though they were used in the form appropriate to the circumstances.)    MD Arline Mistry  Attending Emergency Medicine Physician           Magaly Zuñiga MD  01/01/23 Camden Foreman MD  01/01/23 Camden Foreman MD  01/01/23 [facility] Song Abdullahi MD  01/01/23 [id number]

## 2023-01-01 NOTE — ED PROVIDER NOTES
[facility]. [facility]  Emergency Department Encounter  Emergency Medicine Resident     Pt Name: Germain Mehta  XN  [place] 1970  Date of evaluation: 23  PCP:  Sharon Meier MD    [address] [place] [address]       Chief Complaint   Patient presents with    Fatigue    Fall    Respiratory Distress     HISTORY OF PRESENT ILLNESS  (Location/Symptom, Timing/Onset, Context/Setting, Quality, Duration, ModifyingFactors, Severity.)      Germain Mehta is a 47 y.o. female with PMH of hypertension seizure disorder, COPD, chronic pancreatitis/pancreatic lesion, alcohol use disorder, astrocytoma s/p resection who presents for evaluation of altered mental status. Patient's mother reports that she saw her on Friday and thought that she was intoxicated, then saw her again today and patient was still confused so brought her to the ED. Mother does note that patient has had multiple falls. Patient has not taken any of her medications for the past few months. She complains of shortness of breath. Denies chest pain, abdominal pain, vomiting.   History somewhat limited due to confusion    PAST MEDICAL / SURGICAL / SOCIAL /FAMILY HISTORY      has a past medical history of Acute respiratory failure with hypoxia ([address] Alcohol withdrawal syndrome, with delirium ([address] Alcoholism ([address] Anal warts, Anemia, Anxiety, 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24, the patient underwent 2 surgical resections without known recurrence, Bandemia, Closed fracture of lateral portion of left tibial plateau, Condyloma, COPD (chronic obstructive pulmonary disease) ([address] Depression, Dysphagia, GI bleed, Hypertension, Memory loss, Oxygen dependent, Pain, joint, ankle and foot, Pancreatic lesion, Perianal wart, Peripheral neuropathy, Seizures ([address] Tension headache, Under care of team, Under care of team, Under care of team, Under care of team, Under care of team, Under care of team, Wears glasses, Wears partial dentures, and Wellness examination. No other pertinent PMH on review with patient/guardian. has a past surgical history that includes Hysterectomy (2003); Brain tumor excision (1989); fracture surgery (Left, 07/03/2013); fracture surgery (Right); Upper gastrointestinal endoscopy (N/A, 10/22/2020); Colonoscopy (N/A, 10/22/2020); Endoscopic ultrasonography, GI (N/A, 12/09/2020); Upper gastrointestinal endoscopy (N/A, 04/05/2021); Hand surgery; CT ABSCESS DRAINAGE (07/28/2021); ERCP (N/A, 08/11/2021); ERCP (08/11/2021); Upper gastrointestinal endoscopy (N/A, 09/08/2021); Spine surgery (N/A, 09/10/2021); ERCP (N/A, 10/21/2021); ERCP (10/21/2021); ERCP (10/21/2021); Colonoscopy (N/A, 11/19/2021); Anus surgery (N/A, 01/25/2022); endoscopic ultrasound (upper) (02/09/2022); Upper gastrointestinal endoscopy (N/A, 2/9/2022); and Upper gastrointestinal endoscopy (2/9/2022). No other pertinent PSH on review with patient/guardian.   Social History     Socioeconomic History    Marital status: Single     Spouse name: Not on file    Number of children: Not on file    Years of education: Not on file    Highest education level: Not on file   Occupational History    Not on file   Tobacco Use    Smoking status: Every Day     Packs/day: 1.00     Years: 30.00     Pack years: 30.00     Types: Cigarettes    Smokeless tobacco: Never    Tobacco comments:     CURRENTLY SMOKES 0.5 PPD    Vaping Use    Vaping Use: Never used   Substance and Sexual Activity    Alcohol use: Not Currently     Alcohol/week: 0.0 standard drinks    Drug use: Yes     Frequency: 2.0 times per week     Comment: recreation    Sexual activity: Not Currently   Other Topics Concern    Not on file   Social History Narrative    Not on file     Social Determinants of Health     Financial Resource Strain: Low Risk     Difficulty of Paying Living Expenses: Not very hard   Food Insecurity: No Food Insecurity    Worried About Running Out of Food in the Last Year: Never true    Ran Out of Food in the Last Year: Never true   Transportation Needs: Not on file   Physical Activity: Not on file   Stress: Not on file   Social Connections: Not on file   Intimate Partner Violence: Not on file   Housing Stability: Not on file       I counseled the patient against using tobacco products. Family History   Problem Relation Age of Onset    Other Father     Cancer Maternal Grandmother     Heart Disease Paternal Grandmother     Esophageal Cancer Maternal Aunt     Arthritis Mother      No other pertinent FamHx on review with patient/guardian. Allergies:  Patient has no known allergies. Home Medications:  Prior to Admission medications    Medication Sig Start Date End Date Taking? Authorizing Provider   lipase-protease-amylase (CREON) 97439-01065 units delayed release capsule TAKE ONE CAPSULE BY MOUTH THREE TIMES A DAY WITH A MEAL 12/19/22   Ludivina Barth MD   baclofen (LIORESAL) 10 MG tablet Take 1 tablet by mouth 3 times daily 12/14/22   Ludivina Barth MD   metoclopramide (REGLAN) 5 MG tablet TAKE ONE TABLET BY MOUTH THREE TIMES A DAY 11/15/22   Salas Rivera MD   fluticasone-umeclidin-vilant (TRELEGY ELLIPTA) 319-11.3-64 MCG/INH AEPB Inhale 1 puff into the lungs daily 11/1/22   LOI Roldan CNP   pregabalin (LYRICA) 200 MG capsule Take 1 capsule by mouth nightly for 30 days.  11/1/22 12/1/22  LOI Roldan CNP   albuterol sulfate HFA (VENTOLIN HFA) 108 (90 Base) MCG/ACT inhaler Inhale 2 puffs into the lungs 4 times daily as needed for Wheezing 11/1/22   LOI Roldan CNP   amLODIPine (NORVASC) 10 MG tablet TAKE ONE TABLET BY MOUTH DAILY 10/17/22   Ludivina Barth MD   verapamil (CALAN SR) 120 MG extended release tablet Take 1 tablet by mouth daily 10/13/22   LOI Macdonald CNP   varenicline (CHANTIX) 1 MG tablet TAKE ONE TABLET BY MOUTH TWICE A DAY (START AFTER FINISHING TAPER) 10/3/22   Ludivina Barth MD   hydrOXYzine HCl (ATARAX) 50 MG tablet  9/13/22   Historical MD Keyonna   omeprazole (PRILOSEC) 40 MG delayed release capsule Take 1 capsule by mouth in the morning and at bedtime 9/19/22   Harika Romeo MD   sucralfate (CARAFATE) 1 GM tablet Take 1 tablet by mouth 4 times daily 9/19/22   Harika Romeo MD   atorvastatin (LIPITOR) 20 MG tablet Take 1 tablet by mouth daily 9/6/22   LOI Almodovar CNP   mirtazapine (REMERON) 15 MG tablet  7/21/22   Elizabethyeimy Dey   REXULTI 1 MG TABS tablet  8/9/22   Elizabeth Dey   topiramate (TOPAMAX) 50 MG tablet Take 1 tablet by mouth 2 times daily 8/29/22   LOI Almodovar CNP   busPIRone (BUSPAR) 30 MG tablet Take 1 tablet by mouth in the morning and at bedtime 7/21/22   Elizabeth Dey   solifenacin (VESICARE) 10 MG tablet Take 1 tablet by mouth in the morning. 7/20/22   Xiomara MASTERS MD   rOPINIRole (REQUIP) 1 MG tablet TAKE ONE TABLET BY MOUTH ONCE NIGHTLY 7/11/22   LOI Traore - NP   carBAMazepine (TEGRETOL) 200 MG tablet TAKE ONE TABLET BY MOUTH THREE TIMES A DAY 7/11/22   LOI Traore - JW   Handicap Placard MISC by Does not apply route Expires 5/2027 5/9/22   Ludivina Lucero MD   prazosin (MINIPRESS) 1 MG capsule Take 1 capsule by mouth nightly 5/4/22   Wili Brito APRN - NP   nicotine polacrilex (NICOTINE MINI) 4 MG lozenge Take 1 lozenge by mouth as needed for Smoking cessation Weeks 1 to 6: 1 lozenge every 1 to 2 hours. Weeks 7 to 9: 1 lozenge every 2 to 4 hours. Weeks 10 to 12: 1 lozenge every 4 to 8 hours. Maximum 20 lozenges per day. 4/7/22   Wili Brito APRN - NP   sodium chloride 1 g tablet Take 1 tablet by mouth 4 times daily Note increase in dose per Dr Gualberto Booth 4/4/22   MD quan Naqvi (SENOKOT) 8.6 MG tablet Take 1 tablet by mouth 2 times daily 1/25/22 1/25/23  Carlita Mccartney,    lidocaine (XYLOCAINE) 5 % ointment Apply topically as needed.  1/25/22   Carlita Mccartney DO   simethicone (MYLICON) 80 MG chewable tablet Take 1 tablet by mouth 4 times daily as needed for Flatulence 8/27/21   Ish Leiva MD   vitamin D (ERGOCALCIFEROL) 30472 units CAPS capsule Take 1 capsule by mouth once a week 6/19/19   Ludivina Cobb MD   folic acid (FOLVITE) 1 MG tablet Take 1 tablet by mouth daily 6/19/19   Ludivina Cobb MD       REVIEW OF SYSTEMS    (2-9 systems for level 4, 10 ormore for level 5)      Review of Systems   Respiratory:  Positive for shortness of breath. Psychiatric/Behavioral:  Positive for confusion. PHYSICAL EXAM   (up to 7 for level 4, 8 or more for level 5)      INITIAL VITALS:   BP (!) 109/92   Pulse 81   Temp 97.7 °F (36.5 °C) (Oral)   Resp 25   SpO2 90%     Physical Exam  Constitutional:       General: She is not in acute distress. Appearance: Normal appearance. She is not ill-appearing, toxic-appearing or diaphoretic. HENT:      Head: Normocephalic and atraumatic. Right Ear: External ear normal.      Left Ear: External ear normal.      Mouth/Throat:      Mouth: Mucous membranes are dry. Eyes:      General:         Right eye: No discharge. Left eye: No discharge. Extraocular Movements: Extraocular movements intact. Pupils: Pupils are equal, round, and reactive to light. Cardiovascular:      Rate and Rhythm: Regular rhythm. Tachycardia present. Pulses: Normal pulses. Heart sounds: No murmur heard. Pulmonary:      Effort: Respiratory distress present. Breath sounds: Rhonchi present. Abdominal:      Palpations: Abdomen is soft. Tenderness: There is no abdominal tenderness. Musculoskeletal:      Right lower leg: No edema. Left lower leg: No edema. Comments: No midline spinal tenderness. No clavicular tenderness, chest wall tenderness, abdominal tenderness. Pelvis stable. Healing abrasions to bilateral knees. No tenderness of BUE/BLE. 5/5 strength BUE/BLE. Sensation intact in all extremities. Radial and pedal pulses 2+. Skin:     General: Skin is dry.       Capillary Refill: Capillary refill takes less than 2 seconds. Neurological:      General: No focal deficit present. Mental Status: She is alert. DIFFERENTIAL  DIAGNOSIS     PLAN (LABS / IMAGING / EKG):  Orders Placed This Encounter   Procedures    Culture, Blood 1    Culture, Blood 1    COVID-19, Rapid    Culture, Urine    RAPID INFLUENZA A/B ANTIGENS    Respiratory Panel, Molecular, with COVID-19 (Restricted: peds pts or suitable admitted adults)    MRSA DNA Probe, Nasal    XR CHEST PORTABLE    CT HEAD WO CONTRAST    CT CERVICAL SPINE WO CONTRAST    CT CHEST PULMONARY EMBOLISM W CONTRAST    CT ABDOMEN PELVIS W IV CONTRAST Additional Contrast? None    CT THORACIC SPINE TRAUMA RECONSTRUCTION    CT LUMBAR SPINE TRAUMA RECONSTRUCTION    US GALLBLADDER RUQ    Ammonia    Comprehensive Metabolic Panel    CBC with Auto Differential    Lactate, Sepsis    Magnesium    Urinalysis with Microscopic    Troponin    APTT    Protime-INR    TSH with Reflex    TOX SCR, BLD, ED    Urine Drug Screen    Brain Natriuretic Peptide    ELECTROLYTES PLUS    Hemoglobin and hematocrit, blood    CALCIUM, IONIC (POC)    BLOOD GAS, ARTERIAL    Basic Metabolic Panel    Magnesium    TSH without Reflex    Calcium, Ionized    Ethanol    Calcium, Ionized    Ethanol    ADULT DIET; Regular;  No Added Salt (3-4 gm)    Straight cath    Vital signs per unit routine    Notify physician    Up with assistance    Daily weights    Intake and output    Encourage deep breathing and coughing every two hours while awake    Telemetry monitoring - 72 hour duration    Full Code    Inpatient consult to Hospitalist    Inpatient consult to Heart Failure Nurse/Coordinator    Inpatient consult to Dietitian    Inpatient consult to Cardiology    Droplet Plus Isolation    OT eval and treat    PT evaluation and treat    Initiate Oxygen Therapy Protocol    Pulse Oximetry Spot Check    Non-Heated High Flow Nasal Cannula    Venous Blood Gas, POC    Creatinine W/GFR Point of Care    POCT urea (BUN)    Lactic Acid, POC    POCT Glucose    Venous Blood Gas, POC    Arterial Blood Gas, POC    EKG 12 Lead    EKG 12 Lead    ADMIT TO INPATIENT    Transfer patient    Restraints non-violent or non-self-destructive    Restraints non-violent or non-self-destructive    Restraints non-violent or non-self-destructive     MEDICATIONS ORDERED:  Orders Placed This Encounter   Medications    DISCONTD: ipratropium-albuterol (DUONEB) nebulizer solution 1 ampule     Order Specific Question:   Initiate RT Bronchodilator Protocol     Answer:   Yes - ED protocol    DISCONTD: ipratropium-albuterol (DUONEB) nebulizer solution 1 ampule     Order Specific Question:   Initiate RT Bronchodilator Protocol     Answer:   Yes - ED protocol    iopamidol (ISOVUE-370) 76 % injection 75 mL    0.9 % sodium chloride bolus    ketorolac (TORADOL) injection 15 mg    cefTRIAXone (ROCEPHIN) 1,000 mg in sodium chloride 0.9 % 50 mL IVPB mini-bag     Order Specific Question:   Antimicrobial Indications     Answer:   Pneumonia (CAP)    azithromycin (ZITHROMAX) 500 mg in dextrose 5% 250 mL IVPB     Order Specific Question:   Antimicrobial Indications     Answer:   Pneumonia (CAP)    ondansetron (ZOFRAN) 4 MG/2ML injection     TAMIKO SHAW: cabinet override    ondansetron (ZOFRAN) injection 4 mg    promethazine (PHENERGAN) injection 12.5 mg    piperacillin-tazobactam (ZOSYN) 3,375 mg in dextrose 5 % 50 mL IVPB extended infusion (mini-bag)     Order Specific Question:   Antimicrobial Indications     Answer:   Aspiration Pneumonia     Order Specific Question:   Antimicrobial Indications     Answer:   Intra-Abdominal Infection    hydrOXYzine (VISTARIL) injection 50 mg    albuterol sulfate HFA (PROVENTIL;VENTOLIN;PROAIR) 108 (90 Base) MCG/ACT inhaler 2 puff     Order Specific Question:   Initiate RT Bronchodilator Protocol     Answer:   Yes - Inpatient Protocol    atorvastatin (LIPITOR) tablet 20 mg    busPIRone (BUSPAR) tablet 30 mg    carBAMazepine (TEGRETOL) tablet 200 mg    fluticasone-umeclidin-vilant (TRELEGY ELLIPTA) 100-62.5-25 MCG/INH inhaler 1 puff    hydrOXYzine HCl (ATARAX) tablet 50 mg    lipase-protease-amylas (ZENPEP 15,000) delayed release capsule 30,000 Units    Pancrelipase (Lip-Prot-Amyl) (ZENPEP) 11452-99236 units delayed release capsule 40,000 Units    mirtazapine (REMERON) tablet 15 mg    pantoprazole (PROTONIX) tablet 40 mg    pregabalin (LYRICA) capsule 200 mg    rOPINIRole (REQUIP) tablet 1 mg    sucralfate (CARAFATE) tablet 1 g    topiramate (TOPAMAX) tablet 50 mg    sodium chloride flush 0.9 % injection 5-40 mL    sodium chloride flush 0.9 % injection 10 mL    0.9 % sodium chloride infusion    OR Linked Order Group     ondansetron (ZOFRAN-ODT) disintegrating tablet 4 mg     ondansetron (ZOFRAN) injection 4 mg    polyethylene glycol (GLYCOLAX) packet 17 g    OR Linked Order Group     acetaminophen (TYLENOL) tablet 650 mg     acetaminophen (TYLENOL) suppository 650 mg    enoxaparin (LOVENOX) injection 40 mg     Order Specific Question:   Indication of Use     Answer:   Prophylaxis-DVT/PE    OR Linked Order Group     potassium chloride (KLOR-CON M) extended release tablet 40 mEq     potassium bicarb-citric acid (EFFER-K) effervescent tablet 40 mEq     potassium chloride 10 mEq/100 mL IVPB (Peripheral Line)    magnesium sulfate 2000 mg in 50 mL IVPB premix    lisinopril (PRINIVIL;ZESTRIL) tablet 5 mg    carvedilol (COREG) tablet 3.125 mg    lactulose (CHRONULAC) 10 GM/15ML solution 20 g    haloperidol lactate (HALDOL) 5 MG/ML injection     Monie Simmons: cabinet override    haloperidol lactate (HALDOL) injection 5 mg    DISCONTD: fentaNYL 20 mcg/mL Infusion     Order Specific Question:   Titrate Infusion? Answer:   Yes     Order Specific Question:   Initial Infusion Dose: Answer:   48 mcg/hr     Order Specific Question:   Goal of Therapy is:      Answer:   RASS of -1 to 1     Order Specific Question:   Contact Provider if: Answer:   Patient is receiving the maximum dose and is not achieving the goal of therapy    dexmedetomidine (PRECEDEX) 400 mcg in sodium chloride 0.9 % 100 mL infusion     Order Specific Question:   Titrate Infusion? Answer:   Yes     Order Specific Question:   Initial Infusion Dose: Answer:   0.2 mcg/kg/hr     Order Specific Question:   Goal of Therapy:     Answer:   RASS of -1 to 0     Order Specific Question:   Contact Provider if:     Answer:   New onset HR less than 50 bpm     Order Specific Question:   Contact Provider if:     Answer:   New onset SBP less than 90 mmHg     Order Specific Question:   Contact Provider if:     Answer:   Patient is receiving maximum dose and is not achieving the goal of therapy     DIAGNOSTIC RESULTS / EMERGENCY DEPARTMENT COURSE / MDM     LABS:  Results for orders placed or performed during the hospital encounter of 01/01/23   Culture, Blood 1    Specimen: Blood   Result Value Ref Range    Specimen Description . BLOOD     Special Requests ac 10ml     Culture NO GROWTH <24 HRS    Culture, Blood 1    Specimen: Blood   Result Value Ref Range    Specimen Description . BLOOD     Special Requests LAC 10ML     Culture NO GROWTH <24 HRS    COVID-19, Rapid    Specimen: Nasopharyngeal Swab   Result Value Ref Range    Specimen Description . NASOPHARYNGEAL SWAB     SARS-CoV-2, Rapid Not Detected Not Detected   RAPID INFLUENZA A/B ANTIGENS    Specimen: Nasopharyngeal   Result Value Ref Range    Flu A Antigen NEGATIVE NEGATIVE    Flu B Antigen NEGATIVE NEGATIVE   Ammonia   Result Value Ref Range    Ammonia 69 (H) 11 - 51 umol/L   Comprehensive Metabolic Panel   Result Value Ref Range    Glucose 121 (H) 70 - 99 mg/dL    BUN 37 (H) 6 - 20 mg/dL    Creatinine 0.69 0.50 - 0.90 mg/dL    Est, Glom Filt Rate >60 >60 mL/min/1.73m2    Calcium 6.8 (L) 8.6 - 10.4 mg/dL    Sodium 136 135 - 144 mmol/L    Potassium 5.3 3.7 - 5.3 mmol/L    Chloride 102 98 - 107 mmol/L    CO2 18 (L) 20 - 31 mmol/L Anion Gap 16 9 - 17 mmol/L    Alkaline Phosphatase 197 (H) 35 - 104 U/L    ALT 19 5 - 33 U/L    AST 67 (H) <32 U/L    Total Bilirubin 0.7 0.3 - 1.2 mg/dL    Total Protein 6.6 6.4 - 8.3 g/dL    Albumin 3.1 (L) 3.5 - 5.2 g/dL    Albumin/Globulin Ratio 0.9 (L) 1.0 - 2.5   CBC with Auto Differential   Result Value Ref Range    WBC 17.5 (H) 3.5 - 11.3 k/uL    RBC 3.86 (L) 3.95 - 5.11 m/uL    Hemoglobin 11.2 (L) 11.9 - 15.1 g/dL    Hematocrit 35.3 (L) 36.3 - 47.1 %    MCV 91.5 82.6 - 102.9 fL    MCH 29.0 25.2 - 33.5 pg    MCHC 31.7 28.4 - 34.8 g/dL    RDW 16.4 (H) 11.8 - 14.4 %    Platelets 038 932 - 098 k/uL    MPV 10.6 8.1 - 13.5 fL    NRBC Automated 0.1 (H) 0.0 per 100 WBC    Seg Neutrophils 82 (H) 36 - 65 %    Lymphocytes 12 (L) 24 - 43 %    Monocytes 5 3 - 12 %    Eosinophils % 0 (L) 1 - 4 %    Basophils 0 0 - 2 %    Immature Granulocytes 1 (H) 0 %    Segs Absolute 14.24 (H) 1.50 - 8.10 k/uL    Absolute Lymph # 2.14 1.10 - 3.70 k/uL    Absolute Mono # 0.95 0.10 - 1.20 k/uL    Absolute Eos # <0.03 0.00 - 0.44 k/uL    Basophils Absolute <0.03 0.00 - 0.20 k/uL    Absolute Immature Granulocyte 0.13 0.00 - 0.30 k/uL    RBC Morphology ANISOCYTOSIS PRESENT    Lactate, Sepsis   Result Value Ref Range    Lactic Acid, Sepsis, Whole Blood 3.0 (H) 0.5 - 1.9 mmol/L   Lactate, Sepsis   Result Value Ref Range    Lactic Acid, Sepsis, Whole Blood 1.6 0.5 - 1.9 mmol/L   Magnesium   Result Value Ref Range    Magnesium 2.2 1.6 - 2.6 mg/dL   Urinalysis with Microscopic   Result Value Ref Range    Color, UA DARK YELLOW Yellow    Turbidity UA Clear Clear    Glucose, Ur NEGATIVE NEGATIVE    Bilirubin Urine NEGATIVE  Verified by ictotest. (A) NEGATIVE    Ketones, Urine SMALL (A) NEGATIVE    Specific Gravity, UA 1.020 1.005 - 1.030    Urine Hgb NEGATIVE NEGATIVE    pH, UA 5.5 5.0 - 8.0    Protein, UA 1+ (A) NEGATIVE    Urobilinogen, Urine ELEVATED (A) Normal    Nitrite, Urine NEGATIVE NEGATIVE    Leukocyte Esterase, Urine SMALL (A) NEGATIVE WBC, UA 2 TO 5 0 - 5 /HPF    RBC, UA 0 TO 2 0 - 2 /HPF    Casts UA None 0 - 2 /LPF    Epithelial Cells UA 0 TO 2 0 - 5 /HPF    Bacteria, UA MODERATE (A) None    Yeast, UA MANY (A) None   Troponin   Result Value Ref Range    Troponin, High Sensitivity 25 (H) 0 - 14 ng/L   Troponin   Result Value Ref Range    Troponin, High Sensitivity 26 (H) 0 - 14 ng/L   APTT   Result Value Ref Range    PTT 25.8 20.5 - 30.5 sec   Protime-INR   Result Value Ref Range    Protime 11.1 9.1 - 12.3 sec    INR 1.0    TSH with Reflex   Result Value Ref Range    TSH 0.56 0.30 - 5.00 uIU/mL   TOX SCR, BLD, ED   Result Value Ref Range    Acetaminophen Level <5 (L) 10 - 30 ug/mL    Ethanol <10 <10 mg/dL    Ethanol percent <6.866 <2.861 %    Salicylate Lvl <1 (L) 3 - 10 mg/dL    Toxic Tricyclic Sc,Blood NEGATIVE NEGATIVE   Urine Drug Screen   Result Value Ref Range    Amphetamine Screen, Ur NEGATIVE NEGATIVE    Barbiturate Screen, Ur NEGATIVE NEGATIVE    Benzodiazepine Screen, Urine NEGATIVE NEGATIVE    Cocaine Metabolite, Urine NEGATIVE NEGATIVE    Methadone Screen, Urine NEGATIVE NEGATIVE    Opiates, Urine NEGATIVE NEGATIVE    Phencyclidine, Urine NEGATIVE NEGATIVE    Cannabinoid Scrn, Ur POSITIVE (A) NEGATIVE    Oxycodone Screen, Ur NEGATIVE NEGATIVE    Fentanyl, Ur NEGATIVE NEGATIVE    Test Information       Assay provides medical screening only. The absence of expected drug(s) and/or metabolite(s) may indicate diluted or adulterated urine, limitations of testing or timing of collection.    Brain Natriuretic Peptide   Result Value Ref Range    Pro-BNP 16,515 (H) <300 pg/mL   ELECTROLYTES PLUS   Result Value Ref Range    POC Sodium 138 138 - 146 mmol/L    POC Potassium 4.8 (H) 3.5 - 4.5 mmol/L    POC Chloride 109 (H) 98 - 107 mmol/L    POC TCO2 20 (L) 22 - 30 mmol/L    Anion Gap 10 7 - 16 mmol/L   Hemoglobin and hematocrit, blood   Result Value Ref Range    POC Hemoglobin 11.5 (L) 12.0 - 16.0 g/dL    POC Hematocrit 34 (L) 36 - 46 % CALCIUM, IONIC (POC)   Result Value Ref Range    POC Ionized Calcium 0.96 (L) 1.15 - 1.33 mmol/L   Calcium, Ionized   Result Value Ref Range    Calcium, Ionized 0.93 (L) 1.13 - 1.33 mmol/L   Ethanol   Result Value Ref Range    Ethanol <10 <10 mg/dL    Ethanol percent <0.010 <0.010 %   Venous Blood Gas, POC   Result Value Ref Range    pH, Calvin 7.225 (L) 7.320 - 7.430    pCO2, Calvin 50.4 41.0 - 51.0 mm Hg    pO2, Calvin 33.8 30.0 - 50.0 mm Hg    HCO3, Venous 20.8 (L) 22.0 - 29.0 mmol/L    Negative Base Excess, Calvin 7 (H) 0.0 - 2.0    O2 Sat, Calvin 53 (L) 60.0 - 85.0 %   Creatinine W/GFR Point of Care   Result Value Ref Range    POC Creatinine 0.71 0.51 - 1.19 mg/dL    eGFR, POC >60 mL/min/1.73m2   POCT urea (BUN)   Result Value Ref Range    POC BUN 38 (H) 8 - 26 mg/dL   Lactic Acid, POC   Result Value Ref Range    POC Lactic Acid 1.83 (H) 0.56 - 1.39 mmol/L   POCT Glucose   Result Value Ref Range    POC Glucose 122 (H) 74 - 100 mg/dL   Venous Blood Gas, POC   Result Value Ref Range    pH, Calvin 7.232 (L) 7.320 - 7.430    pCO2, Calvin 41.3 41.0 - 51.0 mm Hg    pO2, Calvin 50.5 (H) 30.0 - 50.0 mm Hg    HCO3, Venous 17.4 (L) 22.0 - 29.0 mmol/L    Negative Base Excess, Calvin 10 (H) 0.0 - 2.0    O2 Sat, Calvin 78 60.0 - 85.0 %   Arterial Blood Gas, POC   Result Value Ref Range    POC pH 7.346 (L) 7.350 - 7.450    POC pCO2 29.5 (L) 35.0 - 48.0 mm Hg    POC PO2 82.3 (L) 83.0 - 108.0 mm Hg    POC HCO3 16.2 (L) 21.0 - 28.0 mmol/L    Negative Base Excess, Art 8 (H) 0.0 - 2.0    POC O2 SAT 96 94.0 - 98.0 %    O2 Device/Flow/% NRB     Manav Test POSITIVE     Sample Site Right Radial Artery     FIO2 100.0    EKG 12 Lead   Result Value Ref Range    Ventricular Rate 80 BPM    Atrial Rate 80 BPM    P-R Interval 216 ms    QRS Duration 104 ms    Q-T Interval 450 ms    QTc Calculation (Bazett) 519 ms    P Axis 58 degrees    R Axis 63 degrees    T Axis 88 degrees       IMPRESSION/MDM/ED COURSE:  47 y.o. female presented with altered mental status for at least 3 days. Patient hypoxic in 40s on arrival, placed nasal cannula but had continued hypoxia in 80s so placed on nonrebreather with improvement to high 90s. On exam patient is A&O x3 but does appear slightly confused when conversing. 5/5 strength all extremities, sensation intact. No midline spinal tenderness. Heart tachycardic but regular. Patient tachypneic with increased work of breathing, rhonchi diffusely. Abdomen soft and nontender. Broad DDx for AMS including hypoxia, hypercarbia, trauma, tox, metabolic, infectious, ischemia. Will obtain broad work-up including lab work, pan scan with CT PE, urine. I did not order a pregnancy test because patient has hysterectomy. Patient has hysterectomy. ED Course as of 01/01/23 1854   Nimisha Arrieta Jan 01, 2023   1313 Patient satting at 100 on nonrebreather, RT at bedside giving breathing treatment.   [AF]   1318 EKG shows normal sinus rhythm normal axis. No ST elevation or depression. T wave inversion inferiorly that was not present on previous EKG. Incomplete RBBB with poor R wave progression. QTc 474. [AF]   4038 Patient with mixed respiratory/metabolic acidosis with pH 7.225. [AF]   9249 Patient with slightly improved mentation now that she has better oxygenation. Had discussion with patient and mother regarding CODE STATUS. Patient would like to be full code.  [AF]   1339 CBC with Auto Differential(!):    WBC 17.5(!)   RBC 3.86(!)   Hemoglobin Quant 11.2(!)   Hematocrit 35.3(!)   MCV 91.5   MCH 29.0   MCHC 31.7   RDW 16.4(!)   Platelet Count 784   MPV 10.6   NRBC Automated 0.1(!)   Seg Neutrophils 82(!)   Lymphocytes 12(!)   Monocytes 5   Eosinophils % 0(!)   Basophils 0   Immature Granulocytes 1(!)   Segs Absolute 14.24(!)   Absolute Lymph # 2.14   Absolute Mono # 0.95   Absolute Eos # <0.03   Basophils Absolute <0.03   Absolute Immature Granulocyte 0.13   RBC Morphology ANISOCYTOSIS PRESENT [AF]   1347 IMPRESSION:  Diffuse infiltrates with differential diagnosis to include multifocal  pneumonitis versus interstitial edema. [AF]   1401 RAPID INFLUENZA A/B ANTIGENS:    Flu A Antigen NEGATIVE   Flu B Antigen NEGATIVE [AF]   1401 COVID-19, Rapid:    Specimen Description . NASOPHARYNGEAL SWAB   SARS-CoV-2, Rapid Not Detected [AF]   1439 Urine Drug Screen(!):    Amphetamine Screen, Ur NEGATIVE   Barbiturate Screen, Ur NEGATIVE   Benzodiazepine Screen, Urine NEGATIVE   Cocaine Metabolite, Urine NEGATIVE   METHADONE SCREEN, URINE, 36171 NEGATIVE   Opiates, Urine NEGATIVE   Phencyclidine, Urine NEGATIVE   Cannabinoid Scrn, Ur POSITIVE(!)   Oxycodone Screen, Ur NEGATIVE   Fentanyl, Ur NEGATIVE   TEST INFORMATION Assay provides medical screening only. The absence of expected drug(s) and/or metabolite(s) may indicate diluted or adulterated urine, limitations of testing or timing of collection. [AF]   5818 TOX SCR, BLD, ED(!):    Acetaminophen Level <5(!)   ETHANOL,ETHA <10   Ethanol percent <8.620   Salicylate Lvl <1(!)   Toxic Tricyclic Sc,Blood PENDING [AF]   9509 IMPRESSION:  No acute intracranial abnormality. [AF]   0278 IMPRESSION:  No acute traumatic injury of the cervical spine. [AF]   7864 IMPRESSION:  1. No evidence of pulmonary embolism. 2. Hazy vascular appearance with diffuse ground-glass background parenchymal  pattern with mosaic type distribution suggesting pulmonary venous congestion. Additionally, there is venous enhancement of the liver suggesting secondary  congestive change. Overall appearance suggest pulmonary edema. 3. Fatty liver. Although incompletely included in the field of view suspect  hepatomegaly. 4. Pancreatic calcifications suggesting chronic pancreatitis. 5. Enlarged right hilar lymph node. [AF]   1622 IMPRESSION:  Findings suggesting stable subacute to chronic superior endplate compression  fracture of L1 with approximately 20% loss of vertebral body height. This is  seen on prior chest CT.      Mild acute inferior endplate compression fracture of L2 without significant  loss of vertebral body height. This is new since prior 09/09/2021 CT but is  age indeterminate. Given history of recent trauma, these may be acute. Distended gallbladder with wall thickening and pericholecystic fluid. This  is nonspecific but can be seen with acute cholecystitis in the proper  clinical setting. Incidentally noted nonobstructing stone in the mid pole left kidney. Evidence of remote pancreatitis. [AF]   1623 Multiple age-indeterminate spinal fractures noted on CT however no midline spinal tenderness on exam.  Will admit to medicine.   [AF]   1627 Spoke to [facility] who accepted patient for admission. [AF]      ED Course User Index  [AF] Awilda Luo,      RADIOLOGY:  CT CHEST PULMONARY EMBOLISM W CONTRAST   Final Result   1. No evidence of pulmonary embolism. 2. Hazy vascular appearance with diffuse ground-glass background parenchymal   pattern with mosaic type distribution suggesting pulmonary venous congestion. Additionally, there is venous enhancement of the liver suggesting secondary   congestive change. Overall appearance suggest pulmonary edema. 3. Fatty liver. Although incompletely included in the field of view suspect   hepatomegaly. 4. Pancreatic calcifications suggesting chronic pancreatitis. 5. Enlarged right hilar lymph node. CT ABDOMEN PELVIS W IV CONTRAST Additional Contrast? None   Final Result   Findings suggesting stable subacute to chronic superior endplate compression   fracture of L1 with approximately 20% loss of vertebral body height. This is   seen on prior chest CT. Mild acute inferior endplate compression fracture of L2 without significant   loss of vertebral body height. This is new since prior 09/09/2021 CT but is   age indeterminate. Given history of recent trauma, these may be acute. Distended gallbladder with wall thickening and pericholecystic fluid.   This   is nonspecific but can be seen with acute cholecystitis in the proper   clinical setting. Incidentally noted nonobstructing stone in the mid pole left kidney. Evidence of remote pancreatitis. Findings suggesting atypical pneumonia in the lung bases. Please see   separate chest CT. CT THORACIC SPINE TRAUMA RECONSTRUCTION   Final Result   Chronic T8, T9, T11 and T12 compression fractures. Chronic burst compression   fracture of T6. No significant change since prior chest CT. No new   fractures of the thoracic spine. CT LUMBAR SPINE TRAUMA RECONSTRUCTION   Final Result   Findings suggesting stable subacute to chronic superior endplate compression   fracture of L1 with approximately 20% loss of vertebral body height. This is   seen on prior chest CT. Mild acute inferior endplate compression fracture of L2 without significant   loss of vertebral body height. This is new since prior 09/09/2021 CT but is   age indeterminate. Given history of recent trauma, these may be acute. Distended gallbladder with wall thickening and pericholecystic fluid. This   is nonspecific but can be seen with acute cholecystitis in the proper   clinical setting. Incidentally noted nonobstructing stone in the mid pole left kidney. Evidence of remote pancreatitis. Findings suggesting atypical pneumonia in the lung bases. Please see   separate chest CT. CT HEAD WO CONTRAST   Final Result   No acute intracranial abnormality. CT CERVICAL SPINE WO CONTRAST   Final Result   No acute traumatic injury of the cervical spine. XR CHEST PORTABLE   Final Result   Diffuse infiltrates with differential diagnosis to include multifocal   pneumonitis versus interstitial edema. US GALLBLADDER RUQ    (Results Pending)       EKG  EKG shows normal sinus rhythm normal axis. No ST elevation or depression. T wave inversion inferiorly that was not present on previous EKG. Incomplete RBBB with poor R wave progression. QTc 474. All EKG's are interpreted by the Emergency Department Physician who either signs or Co-signs this chart in the absence of a cardiologist.    PROCEDURES:  None    CONSULTS:  IP CONSULT TO HOSPITALIST  IP CONSULT TO HEART FAILURE NURSE/COORDINATOR  IP CONSULT TO DIETITIAN  IP CONSULT TO CARDIOLOGY    FINAL IMPRESSION      1. Hypoxia    2. Altered mental status, unspecified altered mental status type    3. Pneumonia of both lungs due to infectious organism, unspecified part of lung          DISPOSITION / PLAN     DISPOSITION Admitted 01/01/2023 04:59:55 PM    PATIENT REFERREDTO:  No follow-up provider specified.     DISCHARGE MEDICATIONS:  Current Discharge Medication List          Jeanine Sparrow DO  PGY 3  Resident Physician Emergency Medicine  01/01/23 6:54 PM    (Please note that portions of this note were completed with a voice recognition program.Efforts were made to edit the dictations but occasionally words are mis-transcribed.)        Huber Aguayo DO  Resident  01/01/23 1949

## 2023-01-01 NOTE — H&P
[facility]  Office: [address], DO, Pal Hemphill, DO, Dasia Kimball, DO, Ana Bill Huang, DO, Peng Reyes MD, Sabine Arias MD, Princess Demarcus MD, Maria D Krishnamurthy MD,  Angeles Reynolds MD, Brielle Gustafson MD, Ladell Scheuermann, DO, Bennie Edwards MD,  Ashley Heath MD, Malik Barber MD, Jaylan Rodriguez, DO, Georgette Jeffery MD, Uriel Mahoney MD, Ava Bell, DO, Teetee Edouard MD, Jackie Blair MD, Estephania Gomes MD, Danyelle Lundy MD, Shiraz Salazar, DO, Gabriele Brown MD, Christin Zapata MD, Aleah Lopez, CNP,  Farhat Kelly, CNP, Eden Purcell, CNP, Maria Alejandra Fernandez, CNP,  Wily Arrieta, [facility], Angel Alcaraz, CNP, Chhaya Bailey, CNP, Geoffrey Pavon, CNP, Arleen Alpers, CNP, Nano Rivera, CNP, Jerene Oppenheim, PA-C, Deidre Diaz, CNS, Evan Lara, CNP, Lani Colmenares, CNP         [facility] [employer]    HISTORY AND PHYSICAL EXAMINATION            Date:   1/1/2023  Patient name:  Jordon Sparrow  Date of admission:  1/1/2023 12:55 PM  MRN:   [id number]  Account:  [de-identified]  YOB: 1970  PCP:    Susanne Cope MD  Room:   08/08  Code Status:    Prior    Chief Complaint:     Chief Complaint   Patient presents with    Fatigue    Fall    Respiratory Distress     History Obtained From:     patient, mother, electronic medical record    History of Present Illness:     Mrs. Elizabeth Hernandez is a 50-year-old female with a significant past medical history of COPD, generalized anxiety disorder, major depressive disorder, EtOH abuse, chronic pancreatitis, seizure disorder, hypertension who initially presented to the emergency department via her mother after her mother visited her and stated she was not wearing her home oxygen was not making any sense. On arrival to the emergency department patient's O2 sat was 42% requiring 25 L nonrebreather.   Labs initially demonstrated lactic acidosis 3.0, leukocytosis 17.5.  Urinalysis concerning for UTI. Initial VBG demonstrated metabolic alkalosis 1.29/24.6/21.3/17.5. Patient was given fluids due to being clinically dry and lactic acidosis. Chest x-ray demonstrated diffuse infiltrates. CT head was negative, CT C-spine demonstrated no acute traumatic injury, CT lumbar spine demonstrated stable subacute to chronic superior endplate compression fracture of L1 and mild acute inferior endplate compression fracture of L2.  CT abdomen pelvis with IV contrast demonstrated distended gallbladder with wall thickening and pericholecystic fluid. Concern for cholecystitis. CT PE demonstrated no pulmonary embolism however did demonstrate hazy vascular appearance with diffuse groundglass background parenchymal pattern with mosaic type distribution suggesting pulmonary venous congestion. On evaluation patient was saturating well on 15 L nonrebreather. However, severely anxious and ED giving IM hydroxyzine. Redirected patient to calm down briefly. However almost immediately after leaving room rapid response was called as patient would not keep nonrebreather on and becoming combative with staff. ICU was contacted and patient was transferred to ICU for Precedex infusion.     Past Medical History:     Past Medical History:   Diagnosis Date    Acute respiratory failure with hypoxia ([address] 10/16/2020    Alcohol withdrawal syndrome, with delirium ([address] 12/14/2019    Alcoholism ([address]     Anal warts     Anemia 10/2020    GI bleed    Anxiety     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24, the patient underwent 2 surgical resections without known recurrence 10/23/2020    at age 24    Bandemia     Closed fracture of lateral portion of left tibial plateau 25/74/1403    Condyloma     COPD (chronic obstructive pulmonary disease) ([address]     CO2 retainer, on Bipap at night for this, Dr. Britton Mccarthy ( last visit 11/20/2020 and note on chart )    Depression      major depressive disorder, ptsd, anxiety Dysphagia     GI bleed 10/2020    Hypertension     Memory loss     Oxygen dependent     USES  3L/MIN DAILY PRN AND NIGHTLY CONTINUOUSLY    Pain, joint, ankle and foot     Pancreatic lesion 10/2020    Dr. Vicky Sidhu working up pt    Perianal wart     Peripheral neuropathy     Seizures [address]     LAST SEIZURE 3/2020 - FEELS IT WAS FROM ALCOHOL WITHDRAWL    Tension headache     Under care of team 09/29/2021    [facility] 2021    Under care of team 09/29/2021    pain management-Hamzah Martines-[facility] ramirezia-last visit sep 2021    Under care of team     pulmonology-Dr Dueñas-[facility]-due for visit March 2022    Under care of team 09/29/2021    psych-bahnfeldt NP-telemed-last visit 10/ 2021    Under care of team 09/29/2021    qw[address]-last visit aug 2021    Under care of team     NEPHROLOGY - DR. LEE    Wears glasses     Wears partial dentures     UPPER    Wellness examination     pcp-Ludivina grimes-last visit Jan 5, 2022        Past Surgical History:     Past Surgical History:   Procedure Laterality Date    ANUS SURGERY N/A 01/25/2022    EXCISION AND FULGURATION, ANAL, VAGINAL AND PERIANAL WARTS WITH CO2 LASER, FORTEC performed by Roland Ashby MD at [email or web address] times 2    COLONOSCOPY N/A 10/22/2020    COLONOSCOPY DIAGNOSTIC performed by Venkatesh Salter MD at [facility]    COLONOSCOPY N/A 11/19/2021    COLONOSCOPY W/ ENDOSCOPIC MUCOSAL RESECTION performed by Venkatesh Salter MD at [facility]    [address]  07/28/2021    CT ABSCESS DRAIN SUBCUTANEOUS 7/28/2021 [facility] CT SCAN    ENDOSCOPIC ULTRASOUND (LOWER) N/A 12/09/2020    ENDOSCOPIC ULTRASOUND, UPPER WITH LINEAR SCOPE FOR BIOPSY OF MASS ON HEAD OF PANCREAS performed by Tereso Ramirez MD at [facility] (UPPER)  02/09/2022    ENDOSCOPIC ULTRASOUND, EGD - GI SCHEDULED,EGD STENT REMOVAL    ERCP N/A 08/11/2021    ERCP STENT INSERTION performed by Barb Quintana MD at [facility]    ERCP  08/11/2021    ERCP SPHINCTER/PAPILLOTOMY performed by Barb Quintana MD at [facility]    ERCP N/A 10/21/2021    ERCP STENT REMOVAL/EXCHANGE performed by Barb Quintana MD at [place] [address]    ERCP  10/21/2021    ERCP STONE REMOVAL performed by Barb Quintana MD at [place] [address]    ERCP  10/21/2021    ERCP ENDOSCOPIC RETROGRADE CHOLANGIOPANCREATOGRAPHY DIRECT VISUALIZATION performed by Barb Quintana MD at [address] Left 07/03/2013    ORIF tibial plateau    FRACTURE SURGERY Right     small finger metacarpal fracture    HAND SURGERY      HYSTERECTOMY (CERVIX STATUS UNKNOWN)  2003    SPINE SURGERY N/A 09/10/2021    KYPHOPLASTY lumbar L5 performed by Milady Ibrahim MD at [facility]. [address] 10/22/2020    EGD BIOPSY performed by Len Mace MD at [facility] 1 04/05/2021    EGD BIOPSY performed by Len Mace MD at [facility] 1 N/A 09/08/2021    ENDOSCOPIC ULTRASOUND, LINEAR SCOPE performed by Franklin Penny MD at [address] [place] [address] N/A 2/9/2022    ENDOSCOPIC ULTRASOUND, EGD - GI SCHEDULED performed by Barb Quintana MD at [facility]. [address]  2/9/2022    EGD STENT REMOVAL performed by Barb Quintana MD at [place] [address]      Medications Prior to Admission:     Prior to Admission medications    Medication Sig Start Date End Date Taking?  Authorizing Provider   lipase-protease-amylase (CREON) 48191-37116 units delayed release capsule TAKE ONE CAPSULE BY MOUTH THREE TIMES A DAY WITH A MEAL 12/19/22   Ludivina Jurado MD   baclofen (LIORESAL) 10 MG tablet Take 1 tablet by mouth 3 times daily 12/14/22   Ludivina Jurado MD   metoclopramide (REGLAN) 5 MG tablet TAKE ONE TABLET BY MOUTH THREE TIMES A DAY 11/15/22   Len Mace MD   fluticasone-umeclidin-vilant (Mozelle Sous) 100-62.5-25 MCG/INH AEPB Inhale 1 puff into the lungs daily 11/1/22   LOI Malone CNP   pregabalin (LYRICA) 200 MG capsule Take 1 capsule by mouth nightly for 30 days.  11/1/22 12/1/22  LOI Malone CNP   albuterol sulfate HFA (VENTOLIN HFA) 108 (90 Base) MCG/ACT inhaler Inhale 2 puffs into the lungs 4 times daily as needed for Wheezing 11/1/22   LOI Malone CNP   amLODIPine (NORVASC) 10 MG tablet TAKE ONE TABLET BY MOUTH DAILY 10/17/22   Ludivina Domínguez MD   verapamil (CALAN SR) 120 MG extended release tablet Take 1 tablet by mouth daily 10/13/22   LOI Manzo CNP   varenicline (CHANTIX) 1 MG tablet TAKE ONE TABLET BY MOUTH TWICE A DAY (START AFTER FINISHING TAPER) 10/3/22   Ludivina Domínguez MD   hydrOXYzine HCl (ATARAX) 50 MG tablet  9/13/22   Historical Provider, MD   omeprazole (PRILOSEC) 40 MG delayed release capsule Take 1 capsule by mouth in the morning and at bedtime 9/19/22   Manohar Lucas MD   sucralfate (CARAFATE) 1 GM tablet Take 1 tablet by mouth 4 times daily 9/19/22   Manohar Lucas MD   atorvastatin (LIPITOR) 20 MG tablet Take 1 tablet by mouth daily 9/6/22   LOI Manzo CNP   mirtazapine (REMERON) 15 MG tablet  7/21/22   Elizabeth Dey   REXULTI 1 MG TABS tablet  8/9/22   Elizabeth Dey   topiramate (TOPAMAX) 50 MG tablet Take 1 tablet by mouth 2 times daily 8/29/22   LOI Manzo CNP   busPIRone (BUSPAR) 30 MG tablet Take 1 tablet by mouth in the morning and at bedtime 7/21/22   Elizabeth Dey   solifenacin (VESICARE) 10 MG tablet Take 1 tablet by mouth in the morning. 7/20/22   Xiomara MASTERS MD   rOPINIRole (REQUIP) 1 MG tablet TAKE ONE TABLET BY MOUTH ONCE NIGHTLY 7/11/22   LOI Salcido NP   carBAMazepine (TEGRETOL) 200 MG tablet TAKE ONE TABLET BY MOUTH THREE TIMES A DAY 7/11/22   LOI Salcido NP   Handicap Placard MISC by Does not apply route Expires 5/2027 5/9/22   Ludivina Domínguez MD   prazosin (MINIPRESS) 1 MG capsule Take 1 capsule by mouth nightly 5/4/22   Saulo Paredes APRN - NP   nicotine polacrilex (NICOTINE MINI) 4 MG lozenge Take 1 lozenge by mouth as needed for Smoking cessation Weeks 1 to 6: 1 lozenge every 1 to 2 hours. Weeks 7 to 9: 1 lozenge every 2 to 4 hours. Weeks 10 to 12: 1 lozenge every 4 to 8 hours. Maximum 20 lozenges per day. 4/7/22   Saulo Paredes APRN - NP   sodium chloride 1 g tablet Take 1 tablet by mouth 4 times daily Note increase in dose per Dr Idalmis Erickson 4/4/22   Maren Mckeon MD   senna (SENOKOT) 8.6 MG tablet Take 1 tablet by mouth 2 times daily 1/25/22 1/25/23  Cypricila Sahu DO   lidocaine (XYLOCAINE) 5 % ointment Apply topically as needed. 1/25/22   Hallie Sahu DO   simethicone (MYLICON) 80 MG chewable tablet Take 1 tablet by mouth 4 times daily as needed for Flatulence 8/27/21   Georgette Celis MD   vitamin D (ERGOCALCIFEROL) 54829 units CAPS capsule Take 1 capsule by mouth once a week 6/19/19   Ludivina Sánchez MD   folic acid (FOLVITE) 1 MG tablet Take 1 tablet by mouth daily 6/19/19   Ludivina Sánchez MD      Allergies:     Patient has no known allergies. Social History:     Tobacco:    reports that she has been smoking cigarettes. She has a 30.00 pack-year smoking history. She has never used smokeless tobacco.  Alcohol:      reports that she does not currently use alcohol. Drug Use:  reports current drug use. Frequency: 2.00 times per week. Family History:     Family History   Problem Relation Age of Onset    Other Father     Cancer Maternal Grandmother     Heart Disease Paternal Grandmother     Esophageal Cancer Maternal Aunt     Arthritis Mother      Review of Systems:     Positive and Negative as described in HPI.     CONSTITUTIONAL:  negative for fevers, chills, sweats, fatigue, weight loss  HEENT:  negative for vision, hearing changes, runny nose, throat pain  RESPIRATORY: Positive for productive cough with green mucus, shortness of breath, dyspnea on exertion. Negative for wheezing. CARDIOVASCULAR:  negative for chest pain, palpitations  GASTROINTESTINAL:  negative for nausea, vomiting, diarrhea, constipation, change in bowel habits, abdominal pain   GENITOURINARY:  negative for difficulty of urination, burning with urination, frequency   INTEGUMENT:  negative for rash, skin lesions, easy bruising   HEMATOLOGIC/LYMPHATIC:  negative for swelling/edema   ALLERGIC/IMMUNOLOGIC:  negative for urticaria , itching  ENDOCRINE:  negative increase in drinking, increase in urination, hot or cold intolerance  MUSCULOSKELETAL:  negative joint pains, muscle aches, swelling of joints  NEUROLOGICAL: Negative for headaches, dizziness, lightheadedness, numbness, pain, tingling extremities  BEHAVIOR/PSYCH:  Positive for anxiety. Negative for depression. Physical Exam:   BP (!) 109/92   Pulse 81   Temp 97.7 °F (36.5 °C) (Oral)   Resp 25   SpO2 90%   Temp (24hrs), Av.1 °F (36.7 °C), Min:97.7 °F (36.5 °C), Max:98.4 °F (36.9 °C)    Recent Labs     23  1316   POCGLU 122*       Intake/Output Summary (Last 24 hours) at 2023 1823  Last data filed at 2023 1604  Gross per 24 hour   Intake 1050 ml   Output --   Net 1050 ml     General Appearance: alert, ill appearing, and in moderate distress secondary to anxiety. Mental status: She is however oriented to person, place, and time but states she has no idea why she is here or what happened prior to being here  Head: normocephalic, atraumatic  Eye: no icterus, redness, extraocular eye movements intact, conjunctiva clear  Ear: normal external ear, no discharge, hearing intact  Nose: no drainage noted  Mouth: mucous membranes dry  Neck: supple, no carotid bruits, thyroid not palpable  Lungs: Bilateral breath sounds but there is rhonchi, no wheezing  Cardiovascular: normal rate, regular rhythm, no murmur, gallop, rub  Abdomen: Soft, tender in the right upper quadrant, nondistended, normal bowel sounds.   No guarding rebound or rigidity. Neurologic: Moves all extremities spontaneously during examination. Follows commands. Skin: Cuts and scrapes to bilateral lower extremity in various stages of healing. Extremities: peripheral pulses palpable, no pedal edema or calf pain with palpation  Psych: Extremely anxious. Will not keep nonrebreather on. Continues to desaturate into the 80s and 70s. Investigations:      Laboratory Testing:  Recent Results (from the past 24 hour(s))   Culture, Blood 1    Collection Time: 01/01/23  1:05 PM    Specimen: Blood   Result Value Ref Range    Specimen Description . BLOOD     Special Requests LAC 10ML     Culture NO GROWTH <24 HRS    Culture, Blood 1    Collection Time: 01/01/23  1:06 PM    Specimen: Blood   Result Value Ref Range    Specimen Description . BLOOD     Special Requests ac 10ml     Culture NO GROWTH <24 HRS    Ammonia    Collection Time: 01/01/23  1:12 PM   Result Value Ref Range    Ammonia 69 (H) 11 - 51 umol/L   Comprehensive Metabolic Panel    Collection Time: 01/01/23  1:12 PM   Result Value Ref Range    Glucose 121 (H) 70 - 99 mg/dL    BUN 37 (H) 6 - 20 mg/dL    Creatinine 0.69 0.50 - 0.90 mg/dL    Est, Glom Filt Rate >60 >60 mL/min/1.73m2    Calcium 6.8 (L) 8.6 - 10.4 mg/dL    Sodium 136 135 - 144 mmol/L    Potassium 5.3 3.7 - 5.3 mmol/L    Chloride 102 98 - 107 mmol/L    CO2 18 (L) 20 - 31 mmol/L    Anion Gap 16 9 - 17 mmol/L    Alkaline Phosphatase 197 (H) 35 - 104 U/L    ALT 19 5 - 33 U/L    AST 67 (H) <32 U/L    Total Bilirubin 0.7 0.3 - 1.2 mg/dL    Total Protein 6.6 6.4 - 8.3 g/dL    Albumin 3.1 (L) 3.5 - 5.2 g/dL    Albumin/Globulin Ratio 0.9 (L) 1.0 - 2.5   CBC with Auto Differential    Collection Time: 01/01/23  1:12 PM   Result Value Ref Range    WBC 17.5 (H) 3.5 - 11.3 k/uL    RBC 3.86 (L) 3.95 - 5.11 m/uL    Hemoglobin 11.2 (L) 11.9 - 15.1 g/dL    Hematocrit 35.3 (L) 36.3 - 47.1 %    MCV 91.5 82.6 - 102.9 fL    MCH 29.0 25.2 - 33.5 pg    MCHC 31.7 28.4 - 34.8 g/dL    RDW 16.4 (H) 11.8 - 14.4 %    Platelets 743 433 - 435 k/uL    MPV 10.6 8.1 - 13.5 fL    NRBC Automated 0.1 (H) 0.0 per 100 WBC    Seg Neutrophils 82 (H) 36 - 65 %    Lymphocytes 12 (L) 24 - 43 %    Monocytes 5 3 - 12 %    Eosinophils % 0 (L) 1 - 4 %    Basophils 0 0 - 2 %    Immature Granulocytes 1 (H) 0 %    Segs Absolute 14.24 (H) 1.50 - 8.10 k/uL    Absolute Lymph # 2.14 1.10 - 3.70 k/uL    Absolute Mono # 0.95 0.10 - 1.20 k/uL    Absolute Eos # <0.03 0.00 - 0.44 k/uL    Basophils Absolute <0.03 0.00 - 0.20 k/uL    Absolute Immature Granulocyte 0.13 0.00 - 0.30 k/uL    RBC Morphology ANISOCYTOSIS PRESENT    Lactate, Sepsis    Collection Time: 01/01/23  1:12 PM   Result Value Ref Range    Lactic Acid, Sepsis, Whole Blood 3.0 (H) 0.5 - 1.9 mmol/L   Magnesium    Collection Time: 01/01/23  1:12 PM   Result Value Ref Range    Magnesium 2.2 1.6 - 2.6 mg/dL   Troponin    Collection Time: 01/01/23  1:12 PM   Result Value Ref Range    Troponin, High Sensitivity 25 (H) 0 - 14 ng/L   APTT    Collection Time: 01/01/23  1:12 PM   Result Value Ref Range    PTT 25.8 20.5 - 30.5 sec   Protime-INR    Collection Time: 01/01/23  1:12 PM   Result Value Ref Range    Protime 11.1 9.1 - 12.3 sec    INR 1.0    TSH with Reflex    Collection Time: 01/01/23  1:12 PM   Result Value Ref Range    TSH 0.56 0.30 - 5.00 uIU/mL   TOX SCR, BLD, ED    Collection Time: 01/01/23  1:12 PM   Result Value Ref Range    Acetaminophen Level <5 (L) 10 - 30 ug/mL    Ethanol <10 <10 mg/dL    Ethanol percent <6.466 <0.694 %    Salicylate Lvl <1 (L) 3 - 10 mg/dL    Toxic Tricyclic Sc,Blood NEGATIVE NEGATIVE   Brain Natriuretic Peptide    Collection Time: 01/01/23  1:12 PM   Result Value Ref Range    Pro-BNP 16,515 (H) <300 pg/mL   Venous Blood Gas, POC    Collection Time: 01/01/23  1:16 PM   Result Value Ref Range    pH, Calvin 7.225 (L) 7.320 - 7.430    pCO2, Calvin 50.4 41.0 - 51.0 mm Hg    pO2, Calvin 33.8 30.0 - 50.0 mm Hg    HCO3, Venous 20.8 (L) 22.0 - 29.0 mmol/L    Negative Base Excess, Calvin 7 (H) 0.0 - 2.0    O2 Sat, Calvin 53 (L) 60.0 - 85.0 %   ELECTROLYTES PLUS    Collection Time: 01/01/23  1:16 PM   Result Value Ref Range    POC Sodium 138 138 - 146 mmol/L    POC Potassium 4.8 (H) 3.5 - 4.5 mmol/L    POC Chloride 109 (H) 98 - 107 mmol/L    POC TCO2 20 (L) 22 - 30 mmol/L    Anion Gap 10 7 - 16 mmol/L   Hemoglobin and hematocrit, blood    Collection Time: 01/01/23  1:16 PM   Result Value Ref Range    POC Hemoglobin 11.5 (L) 12.0 - 16.0 g/dL    POC Hematocrit 34 (L) 36 - 46 %   Creatinine W/GFR Point of Care    Collection Time: 01/01/23  1:16 PM   Result Value Ref Range    POC Creatinine 0.71 0.51 - 1.19 mg/dL    eGFR, POC >60 mL/min/1.73m2   CALCIUM, IONIC (POC)    Collection Time: 01/01/23  1:16 PM   Result Value Ref Range    POC Ionized Calcium 0.96 (L) 1.15 - 1.33 mmol/L   POCT urea (BUN)    Collection Time: 01/01/23  1:16 PM   Result Value Ref Range    POC BUN 38 (H) 8 - 26 mg/dL   Lactic Acid, POC    Collection Time: 01/01/23  1:16 PM   Result Value Ref Range    POC Lactic Acid 1.83 (H) 0.56 - 1.39 mmol/L   POCT Glucose    Collection Time: 01/01/23  1:16 PM   Result Value Ref Range    POC Glucose 122 (H) 74 - 100 mg/dL   COVID-19, Rapid    Collection Time: 01/01/23  1:22 PM    Specimen: Nasopharyngeal Swab   Result Value Ref Range    Specimen Description . NASOPHARYNGEAL SWAB     SARS-CoV-2, Rapid Not Detected Not Detected   RAPID INFLUENZA A/B ANTIGENS    Collection Time: 01/01/23  1:40 PM    Specimen: Nasopharyngeal   Result Value Ref Range    Flu A Antigen NEGATIVE NEGATIVE    Flu B Antigen NEGATIVE NEGATIVE   Urinalysis with Microscopic    Collection Time: 01/01/23  1:54 PM   Result Value Ref Range    Color, UA DARK YELLOW Yellow    Turbidity UA Clear Clear    Glucose, Ur NEGATIVE NEGATIVE    Bilirubin Urine NEGATIVE  Verified by ictotest. (A) NEGATIVE    Ketones, Urine SMALL (A) NEGATIVE    Specific Gravity, UA 1.020 1.005 - 1.030    Urine Hgb NEGATIVE NEGATIVE    pH, UA 5.5 5.0 - 8.0    Protein, UA 1+ (A) NEGATIVE    Urobilinogen, Urine ELEVATED (A) Normal    Nitrite, Urine NEGATIVE NEGATIVE    Leukocyte Esterase, Urine SMALL (A) NEGATIVE    WBC, UA 2 TO 5 0 - 5 /HPF    RBC, UA 0 TO 2 0 - 2 /HPF    Casts UA None 0 - 2 /LPF    Epithelial Cells UA 0 TO 2 0 - 5 /HPF    Bacteria, UA MODERATE (A) None    Yeast, UA MANY (A) None   Urine Drug Screen    Collection Time: 01/01/23  1:54 PM   Result Value Ref Range    Amphetamine Screen, Ur NEGATIVE NEGATIVE    Barbiturate Screen, Ur NEGATIVE NEGATIVE    Benzodiazepine Screen, Urine NEGATIVE NEGATIVE    Cocaine Metabolite, Urine NEGATIVE NEGATIVE    Methadone Screen, Urine NEGATIVE NEGATIVE    Opiates, Urine NEGATIVE NEGATIVE    Phencyclidine, Urine NEGATIVE NEGATIVE    Cannabinoid Scrn, Ur POSITIVE (A) NEGATIVE    Oxycodone Screen, Ur NEGATIVE NEGATIVE    Fentanyl, Ur NEGATIVE NEGATIVE    Test Information       Assay provides medical screening only. The absence of expected drug(s) and/or metabolite(s) may indicate diluted or adulterated urine, limitations of testing or timing of collection.    Lactate, Sepsis    Collection Time: 01/01/23  3:05 PM   Result Value Ref Range    Lactic Acid, Sepsis, Whole Blood 1.6 0.5 - 1.9 mmol/L   Troponin    Collection Time: 01/01/23  3:05 PM   Result Value Ref Range    Troponin, High Sensitivity 26 (H) 0 - 14 ng/L   Calcium, Ionized    Collection Time: 01/01/23  3:05 PM   Result Value Ref Range    Calcium, Ionized 0.93 (L) 1.13 - 1.33 mmol/L   Venous Blood Gas, POC    Collection Time: 01/01/23  4:43 PM   Result Value Ref Range    pH, Calvin 7.232 (L) 7.320 - 7.430    pCO2, Calvin 41.3 41.0 - 51.0 mm Hg    pO2, Calvin 50.5 (H) 30.0 - 50.0 mm Hg    HCO3, Venous 17.4 (L) 22.0 - 29.0 mmol/L    Negative Base Excess, Calvin 10 (H) 0.0 - 2.0    O2 Sat, Calvin 78 60.0 - 85.0 %   Arterial Blood Gas, POC    Collection Time: 01/01/23  5:19 PM   Result Value Ref Range    POC pH 7.346 (L) 7.350 - 7.450    POC pCO2 29.5 (L) 35.0 - 48.0 mm Hg    POC PO2 82.3 (L) 83.0 - 108.0 mm Hg    POC HCO3 16.2 (L) 21.0 - 28.0 mmol/L    Negative Base Excess, Art 8 (H) 0.0 - 2.0    POC O2 SAT 96 94.0 - 98.0 %    O2 Device/Flow/% NRB     Manav Test POSITIVE     Sample Site Right Radial Artery     FIO2 100.0        Imaging/Diagnostics:  CT HEAD WO CONTRAST    Result Date: 1/1/2023  No acute intracranial abnormality. CT CERVICAL SPINE WO CONTRAST    Result Date: 1/1/2023  No acute traumatic injury of the cervical spine. CT ABDOMEN PELVIS W IV CONTRAST Additional Contrast? None    Result Date: 1/1/2023  Findings suggesting stable subacute to chronic superior endplate compression fracture of L1 with approximately 20% loss of vertebral body height. This is seen on prior chest CT. Mild acute inferior endplate compression fracture of L2 without significant loss of vertebral body height. This is new since prior 09/09/2021 CT but is age indeterminate. Given history of recent trauma, these may be acute. Distended gallbladder with wall thickening and pericholecystic fluid. This is nonspecific but can be seen with acute cholecystitis in the proper clinical setting. Incidentally noted nonobstructing stone in the mid pole left kidney. Evidence of remote pancreatitis. Findings suggesting atypical pneumonia in the lung bases. Please see separate chest CT. XR CHEST PORTABLE    Result Date: 1/1/2023  Diffuse infiltrates with differential diagnosis to include multifocal pneumonitis versus interstitial edema. CT CHEST PULMONARY EMBOLISM W CONTRAST    Result Date: 1/1/2023  1. No evidence of pulmonary embolism. 2. Hazy vascular appearance with diffuse ground-glass background parenchymal pattern with mosaic type distribution suggesting pulmonary venous congestion. Additionally, there is venous enhancement of the liver suggesting secondary congestive change. Overall appearance suggest pulmonary edema. 3. Fatty liver.   Although incompletely included in the field of view suspect hepatomegaly. 4. Pancreatic calcifications suggesting chronic pancreatitis. 5. Enlarged right hilar lymph node. CT LUMBAR SPINE TRAUMA RECONSTRUCTION    Result Date: 1/1/2023  Findings suggesting stable subacute to chronic superior endplate compression fracture of L1 with approximately 20% loss of vertebral body height. This is seen on prior chest CT. Mild acute inferior endplate compression fracture of L2 without significant loss of vertebral body height. This is new since prior 09/09/2021 CT but is age indeterminate. Given history of recent trauma, these may be acute. Distended gallbladder with wall thickening and pericholecystic fluid. This is nonspecific but can be seen with acute cholecystitis in the proper clinical setting. Incidentally noted nonobstructing stone in the mid pole left kidney. Evidence of remote pancreatitis. Findings suggesting atypical pneumonia in the lung bases. Please see separate chest CT. CT THORACIC SPINE TRAUMA RECONSTRUCTION    Result Date: 1/1/2023  Chronic T8, T9, T11 and T12 compression fractures. Chronic burst compression fracture of T6. No significant change since prior chest CT. No new fractures of the thoracic spine. Assessment :      Hospital Problems             Last Modified POA    * (Principal) Acute on chronic respiratory failure with hypoxia ([facility] [address] 4/7/3126 Yes    Metabolic encephalopathy 1/6/6182 Yes     Plan:     Patient status inpatient in the Medical ICU    Acute on chronic respiratory failure with hypoxia. Likely multifactorial secondary to COPD noncompliant with 4 L home oxygen and further complicated by pneumonia versus pulmonary vascular congestion. Requiring 15 L nonrebreather. Plan to start bronchodilators, steroids, incentive spirometer and Acapella. Broad-spectrum antibiotics.   Discussed case with critical care Dr. Flash Nayak concerning patient's desaturations off 15 L and noncompliance with nonrebreather as well as patient not appropriate for BiPAP/CPAP given mentation. Rapid response called. ICU at bedside during rapid response patient combative and refusing to wear nonrebreather and oxygen saturations decreasing to 70%. Four-point restraints placed by ICU and nonrebreather replaced with plan for ICU admission for Precedex infusion. Low threshold for intubation given patient's respiratory decline and altered mentation. Acute metabolic encephalopathy. Again likely multifactorial secondary to sepsis with lactic acidosis, UTI, possible cholecystitis. Broad-spectrum antibiotics. Received ceftriaxone and azithromycin in the emergency department. Extensive history of anxiety per mother at bedside. Has not been taking her home medications due to recent influenza virus and feeling unwell. Patient is a recovering alcoholic however denies any EtOH. Denies any recreational drug use. Consultations:   IP CONSULT TO HOSPITALIST  IP CONSULT TO PULMONOLOGY  IP CONSULT TO HEART FAILURE NURSE/COORDINATOR  IP CONSULT TO DIETITIAN  IP CONSULT TO CARDIOLOGY    Patient is admitted as inpatient status because of co-morbidities listed above, severity of signs and symptoms as outlined, requirement for current medical therapies and most importantly because of direct risk to patient if care not provided in a hospital setting. Expected length of stay > 48 hours.     Ivania Talley DO  1/1/2023  6:23 PM    Copy sent to Dr. Neelam Ledezma MD

## 2023-01-01 NOTE — ED NOTES
RT at bedside. Place patient back on NRB, patient continuously removing oxygen, when it's on her sat is above 95%, when off, drops immediately to low 80s, altered.       Mayuri Ambrosio RN  01/01/23 9393

## 2023-01-01 NOTE — ED NOTES
Pt provided blanket, continuously removes NRB. Placed back on patient.       Lexi Tirado RN  01/01/23 0187

## 2023-01-02 ENCOUNTER — APPOINTMENT (OUTPATIENT)
Dept: ULTRASOUND IMAGING | Age: 53
DRG: 133 | End: 2023-01-02
Payer: MEDICARE

## 2023-01-02 LAB
ABSOLUTE EOS #: <0.03 K/UL (ref 0–0.44)
ABSOLUTE IMMATURE GRANULOCYTE: 0.12 K/UL (ref 0–0.3)
ABSOLUTE LYMPH #: 3.53 K/UL (ref 1.1–3.7)
ABSOLUTE MONO #: 0.65 K/UL (ref 0.1–1.2)
ALBUMIN SERPL-MCNC: 2.3 G/DL (ref 3.5–5.2)
ALBUMIN/GLOBULIN RATIO: 0.7 (ref 1–2.5)
ALP BLD-CCNC: 168 U/L (ref 35–104)
ALT SERPL-CCNC: 23 U/L (ref 5–33)
AMMONIA: 47 UMOL/L (ref 11–51)
ANION GAP SERPL CALCULATED.3IONS-SCNC: 9 MMOL/L (ref 9–17)
AST SERPL-CCNC: 64 U/L
BASOPHILS # BLD: 0 % (ref 0–2)
BASOPHILS ABSOLUTE: 0.03 K/UL (ref 0–0.2)
BILIRUB SERPL-MCNC: 0.6 MG/DL (ref 0.3–1.2)
BILIRUBIN DIRECT: 0.4 MG/DL
BILIRUBIN, INDIRECT: 0.2 MG/DL (ref 0–1)
BUN BLDV-MCNC: 38 MG/DL (ref 6–20)
CALCIUM SERPL-MCNC: 7.5 MG/DL (ref 8.6–10.4)
CHLORIDE BLD-SCNC: 106 MMOL/L (ref 98–107)
CO2: 19 MMOL/L (ref 20–31)
CREAT SERPL-MCNC: 0.54 MG/DL (ref 0.5–0.9)
EKG ATRIAL RATE: 60 BPM
EKG ATRIAL RATE: 80 BPM
EKG P AXIS: 53 DEGREES
EKG P AXIS: 58 DEGREES
EKG P-R INTERVAL: 216 MS
EKG P-R INTERVAL: 238 MS
EKG Q-T INTERVAL: 450 MS
EKG Q-T INTERVAL: 516 MS
EKG QRS DURATION: 100 MS
EKG QRS DURATION: 104 MS
EKG QTC CALCULATION (BAZETT): 516 MS
EKG QTC CALCULATION (BAZETT): 519 MS
EKG R AXIS: 53 DEGREES
EKG R AXIS: 63 DEGREES
EKG T AXIS: 88 DEGREES
EKG T AXIS: 88 DEGREES
EKG VENTRICULAR RATE: 60 BPM
EKG VENTRICULAR RATE: 80 BPM
EOSINOPHILS RELATIVE PERCENT: 0 % (ref 1–4)
GFR SERPL CREATININE-BSD FRML MDRD: >60 ML/MIN/1.73M2
GLUCOSE BLD-MCNC: 89 MG/DL (ref 70–99)
HCT VFR BLD CALC: 33.5 % (ref 36.3–47.1)
HEMOGLOBIN: 10.4 G/DL (ref 11.9–15.1)
IMMATURE GRANULOCYTES: 1 %
LYMPHOCYTES # BLD: 25 % (ref 24–43)
MAGNESIUM: 2.9 MG/DL (ref 1.6–2.6)
MCH RBC QN AUTO: 28.3 PG (ref 25.2–33.5)
MCHC RBC AUTO-ENTMCNC: 31 G/DL (ref 28.4–34.8)
MCV RBC AUTO: 91.3 FL (ref 82.6–102.9)
MONOCYTES # BLD: 5 % (ref 3–12)
MRSA, DNA, NASAL: NEGATIVE
MYCOPLASMA PNEUMONIAE IGM: 0.3
NRBC AUTOMATED: 0 PER 100 WBC
PDW BLD-RTO: 16.1 % (ref 11.8–14.4)
PLATELET # BLD: 332 K/UL (ref 138–453)
PMV BLD AUTO: 10.6 FL (ref 8.1–13.5)
POTASSIUM SERPL-SCNC: 4.5 MMOL/L (ref 3.7–5.3)
RBC # BLD: 3.67 M/UL (ref 3.95–5.11)
RBC # BLD: ABNORMAL 10*6/UL
SEG NEUTROPHILS: 69 % (ref 36–65)
SEGMENTED NEUTROPHILS ABSOLUTE COUNT: 9.7 K/UL (ref 1.5–8.1)
SODIUM BLD-SCNC: 134 MMOL/L (ref 135–144)
SPECIMEN DESCRIPTION: NORMAL
TOTAL PROTEIN: 5.5 G/DL (ref 6.4–8.3)
TROPONIN, HIGH SENSITIVITY: 15 NG/L (ref 0–14)
TROPONIN, HIGH SENSITIVITY: 16 NG/L (ref 0–14)
TSH SERPL DL<=0.05 MIU/L-ACNC: 1.4 UIU/ML (ref 0.3–5)
WBC # BLD: 14 K/UL (ref 3.5–11.3)

## 2023-01-02 PROCEDURE — 84443 ASSAY THYROID STIM HORMONE: CPT

## 2023-01-02 PROCEDURE — 6360000002 HC RX W HCPCS: Performed by: NURSE PRACTITIONER

## 2023-01-02 PROCEDURE — 36415 COLL VENOUS BLD VENIPUNCTURE: CPT

## 2023-01-02 PROCEDURE — 76705 ECHO EXAM OF ABDOMEN: CPT

## 2023-01-02 PROCEDURE — 94640 AIRWAY INHALATION TREATMENT: CPT

## 2023-01-02 PROCEDURE — 6360000002 HC RX W HCPCS: Performed by: STUDENT IN AN ORGANIZED HEALTH CARE EDUCATION/TRAINING PROGRAM

## 2023-01-02 PROCEDURE — 6370000000 HC RX 637 (ALT 250 FOR IP): Performed by: STUDENT IN AN ORGANIZED HEALTH CARE EDUCATION/TRAINING PROGRAM

## 2023-01-02 PROCEDURE — 94761 N-INVAS EAR/PLS OXIMETRY MLT: CPT

## 2023-01-02 PROCEDURE — 2580000003 HC RX 258: Performed by: STUDENT IN AN ORGANIZED HEALTH CARE EDUCATION/TRAINING PROGRAM

## 2023-01-02 PROCEDURE — 80076 HEPATIC FUNCTION PANEL: CPT

## 2023-01-02 PROCEDURE — 51701 INSERT BLADDER CATHETER: CPT

## 2023-01-02 PROCEDURE — 51702 INSERT TEMP BLADDER CATH: CPT

## 2023-01-02 PROCEDURE — 2700000000 HC OXYGEN THERAPY PER DAY

## 2023-01-02 PROCEDURE — 99291 CRITICAL CARE FIRST HOUR: CPT | Performed by: INTERNAL MEDICINE

## 2023-01-02 PROCEDURE — 85025 COMPLETE CBC W/AUTO DIFF WBC: CPT

## 2023-01-02 PROCEDURE — 84484 ASSAY OF TROPONIN QUANT: CPT

## 2023-01-02 PROCEDURE — 93010 ELECTROCARDIOGRAM REPORT: CPT | Performed by: INTERNAL MEDICINE

## 2023-01-02 PROCEDURE — 83735 ASSAY OF MAGNESIUM: CPT

## 2023-01-02 PROCEDURE — 93005 ELECTROCARDIOGRAM TRACING: CPT

## 2023-01-02 PROCEDURE — 80048 BASIC METABOLIC PNL TOTAL CA: CPT

## 2023-01-02 PROCEDURE — 2580000003 HC RX 258: Performed by: NURSE PRACTITIONER

## 2023-01-02 PROCEDURE — 2500000003 HC RX 250 WO HCPCS

## 2023-01-02 PROCEDURE — 2000000000 HC ICU R&B

## 2023-01-02 PROCEDURE — 6370000000 HC RX 637 (ALT 250 FOR IP): Performed by: NURSE PRACTITIONER

## 2023-01-02 PROCEDURE — 51798 US URINE CAPACITY MEASURE: CPT

## 2023-01-02 PROCEDURE — 6360000002 HC RX W HCPCS

## 2023-01-02 PROCEDURE — 87899 AGENT NOS ASSAY W/OPTIC: CPT

## 2023-01-02 PROCEDURE — 82140 ASSAY OF AMMONIA: CPT

## 2023-01-02 RX ORDER — HALOPERIDOL 5 MG/ML
5 INJECTION INTRAMUSCULAR ONCE
Status: COMPLETED | OUTPATIENT
Start: 2023-01-02 | End: 2023-01-02

## 2023-01-02 RX ORDER — IPRATROPIUM BROMIDE AND ALBUTEROL SULFATE 2.5; .5 MG/3ML; MG/3ML
1 SOLUTION RESPIRATORY (INHALATION)
Status: DISCONTINUED | OUTPATIENT
Start: 2023-01-02 | End: 2023-01-11 | Stop reason: HOSPADM

## 2023-01-02 RX ORDER — CARBAMAZEPINE 100 MG/5ML
200 SUSPENSION ORAL 3 TIMES DAILY
Status: DISCONTINUED | OUTPATIENT
Start: 2023-01-02 | End: 2023-01-03

## 2023-01-02 RX ORDER — METHYLPREDNISOLONE SODIUM SUCCINATE 40 MG/ML
20 INJECTION, POWDER, LYOPHILIZED, FOR SOLUTION INTRAMUSCULAR; INTRAVENOUS DAILY
Status: DISCONTINUED | OUTPATIENT
Start: 2023-01-02 | End: 2023-01-03

## 2023-01-02 RX ORDER — FUROSEMIDE 10 MG/ML
40 INJECTION INTRAMUSCULAR; INTRAVENOUS ONCE
Status: COMPLETED | OUTPATIENT
Start: 2023-01-02 | End: 2023-01-02

## 2023-01-02 RX ORDER — HALOPERIDOL 5 MG/ML
INJECTION INTRAMUSCULAR
Status: COMPLETED
Start: 2023-01-02 | End: 2023-01-02

## 2023-01-02 RX ORDER — SODIUM CHLORIDE, SODIUM LACTATE, POTASSIUM CHLORIDE, CALCIUM CHLORIDE 600; 310; 30; 20 MG/100ML; MG/100ML; MG/100ML; MG/100ML
INJECTION, SOLUTION INTRAVENOUS CONTINUOUS
Status: DISCONTINUED | OUTPATIENT
Start: 2023-01-02 | End: 2023-01-02

## 2023-01-02 RX ORDER — FUROSEMIDE 10 MG/ML
20 INJECTION INTRAMUSCULAR; INTRAVENOUS 2 TIMES DAILY
Status: DISCONTINUED | OUTPATIENT
Start: 2023-01-03 | End: 2023-01-05

## 2023-01-02 RX ADMIN — HALOPERIDOL LACTATE 5 MG: 5 INJECTION, SOLUTION INTRAMUSCULAR at 14:54

## 2023-01-02 RX ADMIN — BUSPIRONE HYDROCHLORIDE 30 MG: 15 TABLET ORAL at 21:58

## 2023-01-02 RX ADMIN — SODIUM CHLORIDE, PRESERVATIVE FREE 10 ML: 5 INJECTION INTRAVENOUS at 08:46

## 2023-01-02 RX ADMIN — PIPERACILLIN AND TAZOBACTAM 3375 MG: 3; .375 INJECTION, POWDER, FOR SOLUTION INTRAVENOUS at 16:42

## 2023-01-02 RX ADMIN — Medication 100 MG: at 08:48

## 2023-01-02 RX ADMIN — MIRTAZAPINE 15 MG: 15 TABLET, FILM COATED ORAL at 21:58

## 2023-01-02 RX ADMIN — IPRATROPIUM BROMIDE AND ALBUTEROL SULFATE 1 AMPULE: .5; 3 SOLUTION RESPIRATORY (INHALATION) at 19:51

## 2023-01-02 RX ADMIN — SUCRALFATE 1 G: 1 TABLET ORAL at 08:48

## 2023-01-02 RX ADMIN — ATORVASTATIN CALCIUM 20 MG: 20 TABLET, FILM COATED ORAL at 08:48

## 2023-01-02 RX ADMIN — SUCRALFATE 1 G: 1 TABLET ORAL at 11:41

## 2023-01-02 RX ADMIN — ROPINIROLE HYDROCHLORIDE 1 MG: 1 TABLET, FILM COATED ORAL at 21:57

## 2023-01-02 RX ADMIN — DEXMEDETOMIDINE HYDROCHLORIDE 0.8 MCG/KG/HR: 4 INJECTION, SOLUTION INTRAVENOUS at 00:30

## 2023-01-02 RX ADMIN — FUROSEMIDE 40 MG: 10 INJECTION, SOLUTION INTRAMUSCULAR; INTRAVENOUS at 11:53

## 2023-01-02 RX ADMIN — BUSPIRONE HYDROCHLORIDE 30 MG: 15 TABLET ORAL at 08:48

## 2023-01-02 RX ADMIN — PIPERACILLIN AND TAZOBACTAM 3375 MG: 3; .375 INJECTION, POWDER, FOR SOLUTION INTRAVENOUS at 08:48

## 2023-01-02 RX ADMIN — PANCRELIPASE LIPASE, PANCRELIPASE PROTEASE, PANCRELIPASE AMYLASE 40000 UNITS: 20000; 63000; 84000 CAPSULE, DELAYED RELEASE ORAL at 11:41

## 2023-01-02 RX ADMIN — LISINOPRIL 5 MG: 5 TABLET ORAL at 08:48

## 2023-01-02 RX ADMIN — TIOTROPIUM BROMIDE INHALATION SPRAY 2 PUFF: 3.12 SPRAY, METERED RESPIRATORY (INHALATION) at 08:38

## 2023-01-02 RX ADMIN — ENOXAPARIN SODIUM 40 MG: 100 INJECTION SUBCUTANEOUS at 08:48

## 2023-01-02 RX ADMIN — HALOPERIDOL 5 MG: 5 INJECTION INTRAMUSCULAR at 14:54

## 2023-01-02 RX ADMIN — SODIUM CHLORIDE: 9 INJECTION, SOLUTION INTRAVENOUS at 00:30

## 2023-01-02 RX ADMIN — LORAZEPAM 4 MG: 2 INJECTION INTRAMUSCULAR at 13:07

## 2023-01-02 RX ADMIN — PREGABALIN 200 MG: 100 CAPSULE ORAL at 21:57

## 2023-01-02 RX ADMIN — CARBAMAZEPINE 200 MG: 200 TABLET ORAL at 08:48

## 2023-01-02 RX ADMIN — TOPIRAMATE 50 MG: 50 TABLET, FILM COATED ORAL at 09:40

## 2023-01-02 RX ADMIN — CARVEDILOL 3.12 MG: 3.12 TABLET, FILM COATED ORAL at 08:48

## 2023-01-02 RX ADMIN — METHYLPREDNISOLONE SODIUM SUCCINATE 20 MG: 40 INJECTION, POWDER, FOR SOLUTION INTRAMUSCULAR; INTRAVENOUS at 16:39

## 2023-01-02 RX ADMIN — SUCRALFATE 1 G: 1 TABLET ORAL at 21:57

## 2023-01-02 RX ADMIN — PANCRELIPASE LIPASE, PANCRELIPASE PROTEASE, PANCRELIPASE AMYLASE 30000 UNITS: 15000; 47000; 63000 CAPSULE, DELAYED RELEASE ORAL at 11:41

## 2023-01-02 RX ADMIN — PIPERACILLIN AND TAZOBACTAM 3375 MG: 3; .375 INJECTION, POWDER, FOR SOLUTION INTRAVENOUS at 00:31

## 2023-01-02 RX ADMIN — TOPIRAMATE 50 MG: 50 TABLET, FILM COATED ORAL at 21:57

## 2023-01-02 RX ADMIN — LORAZEPAM 4 MG: 2 INJECTION INTRAMUSCULAR at 14:43

## 2023-01-02 RX ADMIN — DEXMEDETOMIDINE HYDROCHLORIDE 0.5 MCG/KG/HR: 4 INJECTION, SOLUTION INTRAVENOUS at 16:34

## 2023-01-02 RX ADMIN — IPRATROPIUM BROMIDE AND ALBUTEROL SULFATE 1 AMPULE: .5; 3 SOLUTION RESPIRATORY (INHALATION) at 15:34

## 2023-01-02 RX ADMIN — LORAZEPAM 2 MG: 2 TABLET ORAL at 11:41

## 2023-01-02 RX ADMIN — PANTOPRAZOLE SODIUM 40 MG: 40 TABLET, DELAYED RELEASE ORAL at 08:48

## 2023-01-02 RX ADMIN — BUDESONIDE AND FORMOTEROL FUMARATE DIHYDRATE 2 PUFF: 160; 4.5 AEROSOL RESPIRATORY (INHALATION) at 08:38

## 2023-01-02 ASSESSMENT — PAIN DESCRIPTION - LOCATION: LOCATION: HIP

## 2023-01-02 ASSESSMENT — PAIN SCALES - GENERAL: PAINLEVEL_OUTOF10: 4

## 2023-01-02 ASSESSMENT — PAIN DESCRIPTION - ORIENTATION: ORIENTATION: RIGHT;LEFT

## 2023-01-02 NOTE — PLAN OF CARE
Problem: Safety - Medical Restraint  Goal: Remains free of injury from restraints (Restraint for Interference with Medical Device)  Description: INTERVENTIONS:  1. Determine that other, less restrictive measures have been tried or would not be effective before applying the restraint  2. Evaluate the patient's condition at the time of restraint application  3. Inform patient/family regarding the reason for restraint  4. Q2H: Monitor safety, psychosocial status, comfort, nutrition and hydration  Outcome: Progressing  Flowsheets (Taken 1/2/2023 1602)  Remains free of injury from restraints (restraint for interference with medical device): Every 2 hours: Monitor safety, psychosocial status, comfort, nutrition and hydration     Problem: Discharge Planning  Goal: Discharge to home or other facility with appropriate resources  Outcome: Progressing     Problem: Pain  Goal: Verbalizes/displays adequate comfort level or baseline comfort level  Outcome: Progressing     Problem: Skin/Tissue Integrity  Goal: Absence of new skin breakdown  Description: 1. Monitor for areas of redness and/or skin breakdown  2. Assess vascular access sites hourly  3. Every 4-6 hours minimum:  Change oxygen saturation probe site  4. Every 4-6 hours:  If on nasal continuous positive airway pressure, respiratory therapy assess nares and determine need for appliance change or resting period.   Outcome: Progressing     Problem: Nutrition Deficit:  Goal: Optimize nutritional status  Outcome: Progressing

## 2023-01-02 NOTE — PROGRESS NOTES
Comprehensive Nutrition Assessment    Type and Reason for Visit:  Initial, Consult (diet education)    Nutrition Recommendations/Plan:   Continue Current Diet, Send ONS as needed. Will monitor tolerance to diet and adequacy of intake. Provide diet education as able/as appropriate. Malnutrition Assessment:  Malnutrition Status:  Insufficient data (01/02/23 1150)    Context:  Chronic Illness     Findings of the 6 clinical characteristics of malnutrition:  Energy Intake:  Unable to assess  Weight Loss:  No significant weight loss     Body Fat Loss:  No significant body fat loss     Muscle Mass Loss:  Unable to assess    Fluid Accumulation:  No significant fluid accumulation (per RN documentation)     Strength:  Not Performed    Nutrition Assessment:    Pt admitted with AMS, SOB, recent falls. PMH includes: COPD, generalized anxiety disorder, major depressive disorder, ethanol abuse, chronic pancreatitis, seizure disorder and hypertension. Pt is currently on a CAMACHO diet. Per RN, pt was NPO for breakfast, but seems to be tolerating/taking lunch well so far. Pt is not currently appropriate for diet education. Meds/labs reviewed    Nutrition Related Findings:    meds/labs reviewed Wound Type: None       Current Nutrition Intake & Therapies:    Average Meal Intake:  (NPO for breakfast; 25% of lunch consumed so far -still eating)  Average Supplements Intake: None Ordered  ADULT DIET; Regular; No Added Salt (3-4 gm)    Anthropometric Measures:  Height: 5' 6\" (167.6 cm)  Ideal Body Weight (IBW): 130 lbs (59 kg)    Admission Body Weight: 141 lb 8.6 oz (64.2 kg)  Current Body Weight: 141 lb 8.6 oz (64.2 kg),   IBW. Weight Source: Bed Scale  Current BMI (kg/m2): 22.9  Usual Body Weight: 152 lb (68.9 kg) (8/9/22 per EHR)  % Weight Change (Calculated): -6.9                    BMI Categories: Normal Weight (BMI 18.5-24. 9)    Estimated Daily Nutrient Needs:  Energy Requirements Based On: Kcal/kg  Weight Used for Energy Requirements: Current  Energy (kcal/day): 1600 kcal/day  Weight Used for Protein Requirements: Current  Protein (g/day): 80 g pro/day  Method Used for Fluid Requirements: ml/Kg (25-28 mL/kg)  Fluid (ml/day): 3504-4469 mL/day    Nutrition Diagnosis:   Predicted inadequate energy intake related to medical condition as evidenced by AMS    Nutrition Interventions:   Food and/or Nutrient Delivery: Continue Current Diet, Send ONS as needed  Nutrition Education/Counseling: Education not appropriate  Coordination of Nutrition Care: Continue to monitor while inpatient  Plan of Care discussed with: RN    Goals:     Goals: Meet at least 75% of estimated needs, within 7 days       Nutrition Monitoring and Evaluation:   Behavioral-Environmental Outcomes: None Identified  Food/Nutrient Intake Outcomes: Diet Advancement/Tolerance, Food and Nutrient Intake  Physical Signs/Symptoms Outcomes: Biochemical Data, Chewing or Swallowing, Fluid Status or Edema, Nutrition Focused Physical Findings, Skin, Weight    Discharge Planning:     Too soon to determine     [address], LD  Contact: [phone]

## 2023-01-02 NOTE — PROGRESS NOTES
SPIRITUAL CARE DEPARTMENT - [address] [address]  PROGRESS NOTE    Shift date: 1.1.2023  Shift day: Sunday   Shift # 2    Room # 1088/1471-61   Name: Gerda Hernandez                Yarsanism: unknown   Place of Mormonism: unknown    Referral: Rapid Response    Admit Date & Time: 1/1/2023 12:55 PM    Assessment:  Gerda Hernandez is a 47 y.o. female in the hospital because of breathing issues. Upon entering the room writer observes patient appearing agitated. Mother is bedside for support and appears to be relatively coping but frustrated. Patient is not complying with medical treatment and has low oxygen levels. A rapid response was called to assist in patient compliance. Patient repeatedly refuses oxygen due to not liking \"being told what to do. \"      Intervention:  Writer introduced self and title as  Writer offered space for the patient and mother  to express feelings, needs, and concerns and provided a ministry presence. Determined family support to be available. Outcome:  Patient refuses to comply and placed in restraints. Plan:  Chaplains will remain available to offer spiritual and emotional support as needed. Electronically signed by Nash Harmon on 1/1/2023 at [facility]  [phone]       01/01/23 1740   Encounter Summary   Service Provided For: Patient and family together   Referral/Consult From: Multi-disciplinary team   Support System Family members   Last Encounter  01/01/23   Complexity of Encounter High   Begin Time 1740   End Time  1810   Total Time Calculated 30 min   Encounter    Type Initial Screen/Assessment   Crisis   Type Rapid Response   Assessment/Intervention/Outcome   Assessment Angry; Anxious   Intervention Active listening;Discussed illness injury and its impact; Explored/Affirmed feelings, thoughts, concerns;Sustaining Presence/Ministry of presence   Outcome Expressed feelings, needs, and concerns;Venting emotion     Electronically signed by Kaila Dahl on 1/1/2023 at 9:22 PM

## 2023-01-02 NOTE — PROGRESS NOTES
INTENSIVE CARE UNIT  Resident Physician Progress Note    Patient - Savita Mueller  Date of Admission -  1/1/2023 12:55 PM  Date of Evaluation -  1/2/2023  Room and Bed Number -  9614/8020-33   Hospital Day - 1  Chief Complaint   Patient presents with    Fatigue    Fall    Respiratory Distress      HPI:     69-year-old female with past medical history significant for COPD, generalized anxiety disorder, major depressive disorder, ethanol abuse, chronic pancreatitis, seizure disorder and hypertension. Patient presented to the emergency department via her mother after her mother visited her and stated that she was not on her home oxygen and appeared altered. On arrival patient's O2 saturation was 42% and she was requiring 25 L via nonrebreather. Initial labs demonstrated lactic acidosis with a lactic acid of 3.0, leukocytosis 17.5 and urinalysis concerning for UTI. Initial VBG showed pH of 7.23/50.4/33.8/20.8. Patient was initially given IV fluid therapy in the setting of lactic acidosis. Chest x-ray demonstrated diffuse infiltrates. CT head was negative. CT PE demonstrated no pulmonary embolism although demonstrated diffuse groundglass background parenchymal pattern with mosaic type distribution suggestive of pulmonary venous congestion. No fractures on lumbar imaging. Patient did complain of right upper quadrant abdominal pain. Patient was very anxious in the emergency department received IM hydroxyzine. Patient was redirectable briefly however rapid response was called as patient would not keep her nonrebreather on and became hypoxic with an O2 saturation of 75 to 80% and was combative with staff. Patient initially was pulling off nonrebreather and initial plan was for intubation. However patient was redirected and placed in restraints and calm down with nonrebreather on and oxygen saturation in the 90s. Admitted to ICU for further management. 1/2: RUQ US pending for pericholecystic fluid. SUBJECTIVE:     OVERNIGHT EVENTS:    Off HFNC, switched to 6 L NC    AWAKE & FOLLOWING COMMANDS:  [] No   [x] Yes    SECRETIONS Amount:  [x] Small [] Moderate  [] Large  [] None  Color:     [x] White [] Colored  [] Bloody    SEDATION:  RAAS Score:  [] Propofol gtt  [] Versed gtt  [] Ativan gtt   [x] No Sedation    PARALYZED:  [x] No    [] Yes    VASOPRESSORS:  [x] No    [] Yes  [] Levophed [] Dopamine [] Vasopressin  [] Dobutamine [] Phenylephrine [] Epinephrine      OBJECTIVE:     VITAL SIGNS:  /89   Pulse 60   Temp 97.5 °F (36.4 °C) (Oral)   Resp 17   Ht 5' 6\" (1.676 m)   Wt 141 lb 8.6 oz (64.2 kg)   SpO2 100%   BMI 22.84 kg/m²   Tmax over 24 hours:  Temp (24hrs), Av.8 °F (36.6 °C), Min:97.5 °F (36.4 °C), Max:98.4 °F (36.9 °C)      Patient Vitals for the past 8 hrs:   BP Temp Temp src Pulse Resp SpO2   23 0600 128/89 -- Oral 60 17 100 %   23 0500 (!) 136/90 -- -- 59 17 100 %   23 0400 (!) 126/98 97.5 °F (36.4 °C) Oral 58 20 100 %   23 0335 -- -- -- 61 16 100 %   23 0300 (!) 145/98 -- -- 60 17 100 %   23 0200 (!) 143/93 97.7 °F (36.5 °C) Oral 62 16 100 %   23 0100 (!) 145/92 -- -- 63 15 100 %   23 0007 -- -- -- 63 16 100 %         Intake/Output Summary (Last 24 hours) at 2023 0804  Last data filed at 2023 0701  Gross per 24 hour   Intake 1506.18 ml   Output 300 ml   Net 1206.18 ml     Date 23 0000 - 23 2359   Shift 8975-1911 3988-0305 4760-9492 24 Hour Total   INTAKE   I.V.(mL/kg) 101.5(1.6)   101.5(1.6)   IV Piggyback(mL/kg) 50(0.8)   50(0.8)   Shift Total(mL/kg) 151.5(2.4)   151.5(2.4)   OUTPUT   Urine(mL/kg/hr) 300(0.6)   300   Shift Total(mL/kg) 300(4.7)   300(4.7)   Weight (kg) 64.2 64.2 64.2 64.2     Wt Readings from Last 3 Encounters:   23 141 lb 8.6 oz (64.2 kg)   22 152 lb (68.9 kg)   22 146 lb 9.6 oz (66.5 kg)     Body mass index is 22.84 kg/m².         PHYSICAL EXAM:  GEN:  awake  EYES:   pupils equal, round, and reactive to light  HEENT:  Normocepalic, without obvious abnormality  LUNGS:  Coarse rhonchi throughout  CV:    regular rate and rhythm  ABDOMEN:   soft and non-tender  MSK:    there is no redness, warmth, or swelling of the joints  NEURO[de-identified]   awake  SKIN:   Normal skin color, texture, turgor  EXTREMITIES:  distal pulses intact       MEDICATIONS:  Scheduled Meds:   piperacillin-tazobactam  3,375 mg IntraVENous Q8H    atorvastatin  20 mg Oral Daily    busPIRone  30 mg Oral BID    carBAMazepine  200 mg Oral TID    lipase-protease-amylas  30,000 Units Oral TID WC    Pancrelipase (Lip-Prot-Amyl)  40,000 Units Oral TID WC    mirtazapine  15 mg Oral Nightly    pantoprazole  40 mg Oral QAM AC    pregabalin  200 mg Oral Nightly    rOPINIRole  1 mg Oral Nightly    sucralfate  1 g Oral 4x Daily    topiramate  50 mg Oral BID    sodium chloride flush  5-40 mL IntraVENous 2 times per day    enoxaparin  40 mg SubCUTAneous Daily    lisinopril  5 mg Oral Daily    carvedilol  3.125 mg Oral BID WC    lactulose  20 g Oral TID    budesonide-formoterol  2 puff Inhalation BID    tiotropium  2 puff Inhalation Daily    sodium chloride flush  5-40 mL IntraVENous 2 times per day    thiamine  100 mg Oral Daily    ondansetron  4 mg IntraVENous Once    magnesium sulfate  2,000 mg IntraVENous Once     Continuous Infusions:   sodium chloride 10 mL/hr at 01/02/23 0701    dexmedetomidine 0.4 mcg/kg/hr (01/02/23 0701)    sodium chloride 10 mL/hr at 01/01/23 2312     PRN Meds:   albuterol sulfate HFA, 2 puff, 4x Daily PRN  hydrOXYzine HCl, 50 mg, TID PRN  sodium chloride flush, 10 mL, PRN  sodium chloride, , PRN  ondansetron, 4 mg, Q8H PRN   Or  ondansetron, 4 mg, Q6H PRN  polyethylene glycol, 17 g, Daily PRN  acetaminophen, 650 mg, Q6H PRN   Or  acetaminophen, 650 mg, Q6H PRN  potassium chloride, 40 mEq, PRN   Or  potassium alternative oral replacement, 40 mEq, PRN   Or  potassium chloride, 10 mEq, PRN  magnesium sulfate, 2,000 mg, PRN  sodium chloride flush, 5-40 mL, PRN  sodium chloride, , PRN  LORazepam, 1 mg, Q1H PRN   Or  LORazepam, 1 mg, Q1H PRN   Or  LORazepam, 2 mg, Q1H PRN   Or  LORazepam, 2 mg, Q1H PRN   Or  LORazepam, 3 mg, Q1H PRN   Or  LORazepam, 3 mg, Q1H PRN   Or  LORazepam, 4 mg, Q1H PRN   Or  LORazepam, 4 mg, Q1H PRN        SUPPORT DEVICES: [] Ventilator [] BIPAP  [x] Nasal Cannula [] Room Air    DATA:  Complete Blood Count:   Recent Labs     01/01/23  1312 01/02/23  0403   WBC 17.5* 14.0*   RBC 3.86* 3.67*   HGB 11.2* 10.4*   HCT 35.3* 33.5*   MCV 91.5 91.3   MCH 29.0 28.3   MCHC 31.7 31.0   RDW 16.4* 16.1*    332   MPV 10.6 10.6        Last 3 Blood Glucose:   Recent Labs     01/01/23  1312 01/02/23  0403   GLUCOSE 121* 89        PT/INR:    Lab Results   Component Value Date/Time    PROTIME 11.1 01/01/2023 01:12 PM    INR 1.0 01/01/2023 01:12 PM     PTT:    Lab Results   Component Value Date/Time    APTT 25.8 01/01/2023 01:12 PM       Comprehensive Metabolic Profile:   Recent Labs     01/01/23  1312 01/01/23  1316 01/02/23  0403     --  134*   K 5.3  --  4.5     --  106   CO2 18*  --  19*   BUN 37*  --  38*   CREATININE 0.69 0.71 0.54   GLUCOSE 121*  --  89   CALCIUM 6.8*  --  7.5*   PROT 6.6  --  5.5*   LABALBU 3.1*  --  2.3*   BILITOT 0.7  --  0.6   ALKPHOS 197*  --  168*   AST 67*  --  64*   ALT 19  --  23      Magnesium:   Lab Results   Component Value Date/Time    MG 2.9 01/02/2023 04:03 AM    MG 2.2 01/01/2023 01:12 PM    MG 1.6 10/12/2021 05:02 PM     Phosphorus:   Lab Results   Component Value Date/Time    PHOS 3.7 07/23/2021 06:29 AM    PHOS 2.5 10/27/2019 08:33 PM    PHOS 4.0 07/14/2019 05:29 AM     Ionized Calcium:   Lab Results   Component Value Date/Time    CAION 0.94 01/01/2023 08:21 PM    CAION 0.93 01/01/2023 03:05 PM    CAION 1.30 07/22/2021 03:22 AM        Urinalysis:   Lab Results   Component Value Date/Time    NITRU NEGATIVE 01/01/2023 01:54 PM    COLORU DARK YELLOW 01/01/2023 01:54 PM PHUR 5.5 01/01/2023 01:54 PM    WBCUA 2 TO 5 01/01/2023 01:54 PM    RBCUA 0 TO 2 01/01/2023 01:54 PM    MUCUS 2+ 09/01/2022 11:50 AM    TRICHOMONAS NOT REPORTED 10/12/2021 04:15 PM    YEAST MANY 01/01/2023 01:54 PM    BACTERIA MODERATE 01/01/2023 01:54 PM    CLARITYU cloudy 12/18/2018 03:46 PM    SPECGRAV 1.020 01/01/2023 01:54 PM    LEUKOCYTESUR SMALL 01/01/2023 01:54 PM    UROBILINOGEN ELEVATED 01/01/2023 01:54 PM    BILIRUBINUR NEGATIVE  Verified by ictotest. 01/01/2023 01:54 PM    BILIRUBINUR moderate 12/18/2018 03:46 PM    BLOODU neg 12/18/2018 03:46 PM    GLUCOSEU NEGATIVE 01/01/2023 01:54 PM    KETUA SMALL 01/01/2023 01:54 PM    AMORPHOUS 2+ 09/01/2022 11:50 AM       HgBA1c:    Lab Results   Component Value Date/Time    LABA1C 5.5 04/13/2021 11:34 AM     TSH:    Lab Results   Component Value Date/Time    TSH 1.40 01/02/2023 04:03 AM     Lactic Acid: No results found for: LACTA   Troponin: No results for input(s): TROPONINI in the last 72 hours.       ASSESSMENT:     Patient Active Problem List    Diagnosis Date Noted    Acute on chronic respiratory failure with hypoxia ([facility] [address] 01/01/2023    Intractable migraine without aura and without status migrainosus 05/24/2022    Sleep disturbance 05/04/2022    Moderate episode of recurrent major depressive disorder ([address] 12/14/2021    Adenomatous polyp of sigmoid colon     Hyponatremia 10/12/2021    Iron deficiency anemia 10/12/2021    Sepsis ([address] 08/09/2021    Acute recurrent pancreatitis 08/09/2021    Atelectasis of left lung 08/09/2021    Fluid collection of pancreas     Diverticulitis of large intestine without perforation or abscess with bleeding     Pseudocyst of pancreas     Alcohol-induced acute pancreatitis 07/31/2021    Community acquired pneumonia of left lower lobe of lung 07/29/2021    Septicemia ([facility] [address]     Metabolic acidosis with increased anion gap and accumulation of organic acids 07/28/2021    Compression fracture of thoracic vertebra ([facility] [address] Pathological compression fracture of lumbar vertebra with delayed healing     Closed fracture of fifth thoracic vertebra ([address] 07/22/2021    Diverticulitis of large intestine with abscess without bleeding 07/22/2021    Elevated alkaline phosphatase level 07/22/2021    Chronic pancreatitis ([address] 07/22/2021    Erythema ab igne 07/22/2021    Severe sepsis ([address] 07/21/2021    Multilevel spine pain 06/30/2021    Chronic pain syndrome 06/30/2021    Marijuana abuse 06/30/2021    Chronic peripheral neuropathic pain 05/04/2021    History of alcohol abuse 05/04/2021    History of petit-mal seizures 05/04/2021    Intractable episodic tension-type headache 05/04/2021    Personal history of astrocytoma 05/04/2021    Esophageal dysphagia     Major depressive disorder, recurrent severe without psychotic features ([address] 03/31/2021    AMAN (generalized anxiety disorder) 03/22/2021    Perineal mass, female 10/23/2020    Esophagitis candidal  10/23/2020    Condyloma 10/23/2020    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24, the patient underwent 2 surgical resections without known recurrence 10/23/2020    Alcohol use disorder, severe, in early remission ([address] 18/56/6461    Metabolic encephalopathy     Recurrent major depressive disorder ([address] 10/20/2020    Elevated CA 19-9 level 10/20/2020    Pancreatic cyst 10/19/2020    Acute respiratory failure with hypoxia ([address] 10/16/2020    Paresthesia of lower extremity 10/13/2020    COPD with acute exacerbation ([address] 10/12/2020    Seizure disorder ([address]     Ambulatory dysfunction 12/17/2019    Tobacco use     Hypomagnesemia 41/85/3593    Alcoholic peripheral neuropathy ([address] 12/14/2019    Hypokalemia 12/14/2019    Macrocytic anemia 12/14/2019    Psychophysiological insomnia 06/05/2019    Anxiety 06/05/2019    Essential hypertension 08/13/2015    Nicotine addiction 08/13/2015    Reflex sympathetic dystrophy of lower limb 03/27/2014    Acute bronchitis 10/02/2012          PLAN:     Neuro  Metabolic encephalopathy  Hx alcohol abuse, hx chronic pancreatitis  Precedex gtt  Hx anxiety, on atarax at home  Hx COPD, on 4 L NC at home, has not been compliant  Seizure precautions  CIWA protocol  Buspar 30 BID  Carbamazepine 200 TID, Topamax 50 BID  Remeron 15  Pregabalin 200  Requip 1 mg    Cardiology  Cardiology consulted  EKG 1/1 with T wave inversions laterally  Avoid Qtc prolonging medications  Hx seizure disorder on carbamazepine  Lipitor  Coreg 3.125 BID  Lisinopril 5 daily    Pulmonary  6 L NC  Spiriva  Symbicort  Monitor respiratory status  ABG PRN    Renal  U out 300 by straight cath  Cr 0.54    GI  Adult diet, no added salt  Hx alcohol abuse with chronic pancreatitis  Ammonia 69  Lactulose 20 g TID  Lipase-protease-amylase 30,000 TID  Pancrelipase 40,000 TID  CT abdomen showing possible pericholecystic fluid  F/U RUQ US today  Protonix 40   Carafate 1 g QID  Thiamine    ID  Zosyn  Given vanco and zosyn in ED  WBC 14    Endo  Glucose 89    WEAN PER PROTOCOL:  [] No   [] Yes  [x] N/A    ICU PROPHYLAXIS:  Stress ulcer:  [x] PPI Agent  [] [id number] [] Sucralfate  [] Other:  VTE:   [x] Enoxaparin  [] Unfract. Heparin Subcut  [] EPC Cuffs    NUTRITION:  [] NPO [] Tube Feeding (Specify: ) [] TPN  [x] PO    HOME MEDS RECONCILED: [] No  [x] Yes    CONSULTATION NEEDED:  [x] No  [] Yes    TRANSFER OUT OF ICU:   [x] No  [] Yes      Braeden Peacock M.D. Emergency Medicine Resident, PGY-2  1/2/2023 8:04 AM  Attending Physician Statement  I have discussed the care of Adithya Bustamante, including pertinent history and exam findings,  with the resident. I have seen and examined the patient and the key elements of all parts of the encounter have been performed by me. I agree with the assessment, plan and orders as documented by the resident with additions .     Continue high flow oxygen   Cont precedex gtt   High risk for intubation     Total critical care time caring for this patient with life threatening, unstable organ failure, including direct patient contact, management of life support systems, review of data including imaging and labs, discussions with other team members and physicians at least 28   Min so far today, excluding procedures. Electronically signed by Siva Pearson MD on   1/2/23 at 10:06 PM EST    Please note that this chart was generated using voice recognition Dragon dictation software. Although every effort was made to ensure the accuracy of this automated transcription, some errors in transcription may have occurred.

## 2023-01-02 NOTE — PLAN OF CARE
Problem: Safety - Medical Restraint  Goal: Remains free of injury from restraints (Restraint for Interference with Medical Device)  Description: INTERVENTIONS:  1. Determine that other, less restrictive measures have been tried or would not be effective before applying the restraint  2. Evaluate the patient's condition at the time of restraint application  3. Inform patient/family regarding the reason for restraint  4.  Q2H: Monitor safety, psychosocial status, comfort, nutrition and hydration  1/2/2023 0825 by January Vivas RN  Outcome: Completed  1/1/2023 2120 by Malick Holloway RN  Outcome: Progressing

## 2023-01-02 NOTE — PLAN OF CARE
Problem: Safety - Medical Restraint  Goal: Remains free of injury from restraints (Restraint for Interference with Medical Device)  Description: INTERVENTIONS:  1. Determine that other, less restrictive measures have been tried or would not be effective before applying the restraint  2. Evaluate the patient's condition at the time of restraint application  3. Inform patient/family regarding the reason for restraint  4.  Q2H: Monitor safety, psychosocial status, comfort, nutrition and hydration  Outcome: Progressing     Problem: Discharge Planning  Goal: Discharge to home or other facility with appropriate resources  Outcome: Progressing     Problem: Pain  Goal: Verbalizes/displays adequate comfort level or baseline comfort level  Outcome: Progressing

## 2023-01-02 NOTE — CONSULTS
[facility]   Consult Note                 Date:   1/2/2023  Patient name: Tray Covarrubias  Date of admission:  1/1/2023 12:55 PM  MRN:   [id number]  YOB: 1970    Reason for Consult: Elevated troponin    CHIEF COMPLAINT: AMS    History Obtained From:  Patient     HISTORY OF PRESENT ILLNESS:    The patient is a 47 y.o. female presenting on January 1 after being found altered mental status at home after home oxygen. In the ED, CT chest showed signs of groundglass opacities and pulmonary venous congestion and was placed on nonrebreather. Patient suspected to have sepsis on admission, and was found to have elevated troponins. Patient has a past medical history for COPD on home oxygen, MDD, ethanol abuse, chronic pancreatitis, seizure disorder, hypertension, active smoker, polysubstance abuse. Past Medical History:   has a past medical history of Acute respiratory failure with hypoxia ([address] Alcohol withdrawal syndrome, with delirium ([address] Alcoholism ([address] Anal warts, Anemia, Anxiety, 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24, the patient underwent 2 surgical resections without known recurrence, Bandemia, Closed fracture of lateral portion of left tibial plateau, Condyloma, COPD (chronic obstructive pulmonary disease) ([address] Depression, Dysphagia, GI bleed, Hypertension, Memory loss, Oxygen dependent, Pain, joint, ankle and foot, Pancreatic lesion, Perianal wart, Peripheral neuropathy, Seizures ([address] Tension headache, Under care of team, Under care of team, Under care of team, Under care of team, Under care of team, Under care of team, Wears glasses, Wears partial dentures, and Wellness examination. Past Surgical History:   has a past surgical history that includes Hysterectomy (2003); Brain tumor excision (1989); fracture surgery (Left, 07/03/2013); fracture surgery (Right); Upper gastrointestinal endoscopy (N/A, 10/22/2020); Colonoscopy (N/A, 10/22/2020);  Endoscopic ultrasonography, GI (N/A, 12/09/2020); Upper gastrointestinal endoscopy (N/A, 04/05/2021); Hand surgery; CT ABSCESS DRAINAGE (07/28/2021); ERCP (N/A, 08/11/2021); ERCP (08/11/2021); Upper gastrointestinal endoscopy (N/A, 09/08/2021); Spine surgery (N/A, 09/10/2021); ERCP (N/A, 10/21/2021); ERCP (10/21/2021); ERCP (10/21/2021); Colonoscopy (N/A, 11/19/2021); Anus surgery (N/A, 01/25/2022); endoscopic ultrasound (upper) (02/09/2022); Upper gastrointestinal endoscopy (N/A, 2/9/2022); and Upper gastrointestinal endoscopy (2/9/2022). Home Medications:    Prior to Admission medications    Medication Sig Start Date End Date Taking? Authorizing Provider   lipase-protease-amylase (CREON) 43438-38652 units delayed release capsule TAKE ONE CAPSULE BY MOUTH THREE TIMES A DAY WITH A MEAL 12/19/22   Ludivina Jurado MD   baclofen (LIORESAL) 10 MG tablet Take 1 tablet by mouth 3 times daily 12/14/22   Ludivina Jurado MD   metoclopramide (REGLAN) 5 MG tablet TAKE ONE TABLET BY MOUTH THREE TIMES A DAY 11/15/22   Jeremy Alfonso MD   fluticasone-umeclidin-vilant (TRELEGY ELLIPTA) 122-37.7-76 MCG/INH AEPB Inhale 1 puff into the lungs daily 11/1/22   Kaylee Foster, APRN - CNP   pregabalin (LYRICA) 200 MG capsule Take 1 capsule by mouth nightly for 30 days.  11/1/22 12/1/22  Kaylee Foster, APRN - CNP   albuterol sulfate HFA (VENTOLIN HFA) 108 (90 Base) MCG/ACT inhaler Inhale 2 puffs into the lungs 4 times daily as needed for Wheezing 11/1/22   Kaylee Foster, APRN - CNP   amLODIPine (NORVASC) 10 MG tablet TAKE ONE TABLET BY MOUTH DAILY 10/17/22   Ludivina Jurado MD   verapamil (CALAN SR) 120 MG extended release tablet Take 1 tablet by mouth daily 10/13/22   LOI Cohen - CNP   varenicline (CHANTIX) 1 MG tablet TAKE ONE TABLET BY MOUTH TWICE A DAY (START AFTER FINISHING TAPER) 10/3/22   Ludivina Bhavani Jurado MD   hydrOXYzine HCl (ATARAX) 50 MG tablet  9/13/22   Historical Provider, MD   omeprazole (PRILOSEC) 40 MG delayed release capsule Take 1 capsule by mouth in the morning and at bedtime 9/19/22   Malena Aragon MD   sucralfate (CARAFATE) 1 GM tablet Take 1 tablet by mouth 4 times daily 9/19/22   Malena Aragon MD   atorvastatin (LIPITOR) 20 MG tablet Take 1 tablet by mouth daily 9/6/22   LOI Marcum CNP   mirtazapine (REMERON) 15 MG tablet  7/21/22   Elizabeth Dey   REXULTI 1 MG TABS tablet  8/9/22   Elizabeth Dey   topiramate (TOPAMAX) 50 MG tablet Take 1 tablet by mouth 2 times daily 8/29/22   LOI Marcum CNP   busPIRone (BUSPAR) 30 MG tablet Take 1 tablet by mouth in the morning and at bedtime 7/21/22   Elizabeth Dey   solifenacin (VESICARE) 10 MG tablet Take 1 tablet by mouth in the morning. 7/20/22   Xiomara MASTERS MD   rOPINIRole (REQUIP) 1 MG tablet TAKE ONE TABLET BY MOUTH ONCE NIGHTLY 7/11/22   LOI Martinez NP   carBAMazepine (TEGRETOL) 200 MG tablet TAKE ONE TABLET BY MOUTH THREE TIMES A DAY 7/11/22   LOI Martinez NP   Handicap Placard MISC by Does not apply route Expires 5/2027 5/9/22   Ludivina Aiken MD   prazosin (MINIPRESS) 1 MG capsule Take 1 capsule by mouth nightly 5/4/22   LOI Martinez NP   nicotine polacrilex (NICOTINE MINI) 4 MG lozenge Take 1 lozenge by mouth as needed for Smoking cessation Weeks 1 to 6: 1 lozenge every 1 to 2 hours. Weeks 7 to 9: 1 lozenge every 2 to 4 hours. Weeks 10 to 12: 1 lozenge every 4 to 8 hours. Maximum 20 lozenges per day. 4/7/22   LOI Martinez NP   sodium chloride 1 g tablet Take 1 tablet by mouth 4 times daily Note increase in dose per Dr Vandana Martinez 4/4/22   Antonio Kimball MD   senna (SENOKOT) 8.6 MG tablet Take 1 tablet by mouth 2 times daily 1/25/22 1/25/23  Kelli Pipe, DO   lidocaine (XYLOCAINE) 5 % ointment Apply topically as needed.  1/25/22   Kelli Jennye, DO   simethicone (MYLICON) 80 MG chewable tablet Take 1 tablet by mouth 4 times daily as needed for Flatulence 8/27/21   Malena Aragon MD   vitamin D (ERGOCALCIFEROL) 36612 units CAPS capsule Take 1 capsule by mouth once a week 6/19/19   Ludivina Montgomery MD   folic acid (FOLVITE) 1 MG tablet Take 1 tablet by mouth daily 6/19/19   Ludivina Montgomery MD       Allergies:  Patient has no known allergies. Social History:   reports that she has been smoking cigarettes. She has a 30.00 pack-year smoking history. She has never used smokeless tobacco. She reports that she does not currently use alcohol. She reports current drug use. Frequency: 2.00 times per week. Family History: family history includes Arthritis in her mother; Cancer in her maternal grandmother; Esophageal Cancer in her maternal aunt; Heart Disease in her paternal grandmother; Other in her father. No for premature CAD. No for h/o sudden cardiac death    REVIEW OF SYSTEMS:    Unable to assess ROS, altered. PHYSICAL EXAM:    Physical Examination:    /89   Pulse 60   Temp 97.8 °F (36.6 °C) (Oral)   Resp 17   Ht 5' 6\" (1.676 m)   Wt 141 lb 8.6 oz (64.2 kg)   SpO2 100%   BMI 22.84 kg/m²    Constitutional and General Appearance:  altered   HEENT: PERRL, no cervical lymphadenopathy  Respiratory:  Good respiratory effort. No for increased work of breathing. On auscultation: clear to auscultation bilaterally, no basilar rales, no wheezing   Cardiovascular:  regular S1 and S2. No murmur audible   No Jugular venous distention, no hepatojugular reflex  Peripheral pulses are symmetrical and full   Abdomen:  No masses or tenderness  Bowel sounds present  Extremities:   No Cyanosis or Clubbing   Lower extremity edema: No   Skin: Warm and dry  Neurological:  Not done       DATA:    Diagnostics:      EKG  1/2/23 sinus rhythm, first-degree AV block, Normal axis, T wave inversion in anterior leads, prolonged QTc    ECHO  7/22/21  Left ventricle is normal in size.  Global left ventricular systolic function  is difficult to assess due to heart rate but appears normal. Calculated  ejection fraction 70% by Guadarrama's method. Visually estimated EF 60-65%. Left atrium is normal in size. Lipomatous atrial septum. No shunt seen by color Doppler. Normal right ventricular size and function. No significant valvular regurgitation or stenosis seen. Coronary angiography: Not done  Labs:     CBC:   Recent Labs     01/01/23  1312 01/02/23  0403   WBC 17.5* 14.0*   HGB 11.2* 10.4*   HCT 35.3* 33.5*    332     BMP:   Recent Labs     01/01/23  1312 01/01/23  1316 01/02/23  0403     --  134*   K 5.3  --  4.5   CO2 18*  --  19*   BUN 37*  --  38*   CREATININE 0.69 0.71 0.54   LABGLOM >60  --  >60   GLUCOSE 121*  --  89     BNP: No results for input(s): BNP in the last 72 hours. PT/INR:   Recent Labs     01/01/23  1312   PROTIME 11.1   INR 1.0     APTT:  Recent Labs     01/01/23 1312   APTT 25.8     CARDIAC ENZYMES:No results for input(s): CKTOTAL, CKMB, CKMBINDEX, TROPONINI in the last 72 hours. FASTING LIPID PANEL:  Lab Results   Component Value Date/Time    HDL 62 09/01/2022 11:49 AM    TRIG 130 09/01/2022 11:49 AM     LIVER PROFILE:  Recent Labs     01/01/23  1312 01/02/23  0403   AST 67* 64*   ALT 19 23   LABALBU 3.1* 2.3*         IMPRESSION:    Elevated troponin likely demand ischemia in the setting of sepsis  Hypertension  Anemia  COPD  MDD  Smoking history  Polysubstance abuse   chronic pancreatitis      RECOMMENDATIONS:  Continue to trend troponin 2 more times today. Avoid QTC prolonging medication  Educated on the risks of continuing smoking  Receiving Precedex for agitation  Continue on home blood pressure medication with holding parameters  Rest of care as per primary      Discussed with patient and nursing. Carmella Marie MD  Fellow, [address] [facility]   Pager - [phone]    Attending note. Patient was seen and examined agree with the evaluation and plan documented above. Shortness of breath and acute respiratory failure.   Pneumonia with acute CHF. Will consider gentle IV diuresis. Treatment of pneumonia per primary service. Mildly elevated high-sensitivity troponins. Will observe for now and trend. We will repeat echocardiogram.  We will follow.

## 2023-01-03 LAB
ABSOLUTE EOS #: <0.03 K/UL (ref 0–0.44)
ABSOLUTE IMMATURE GRANULOCYTE: 0.04 K/UL (ref 0–0.3)
ABSOLUTE LYMPH #: 1.64 K/UL (ref 1.1–3.7)
ABSOLUTE MONO #: 0.45 K/UL (ref 0.1–1.2)
ANION GAP SERPL CALCULATED.3IONS-SCNC: 15 MMOL/L (ref 9–17)
BASOPHILS # BLD: 0 % (ref 0–2)
BASOPHILS ABSOLUTE: <0.03 K/UL (ref 0–0.2)
BUN BLDV-MCNC: 27 MG/DL (ref 6–20)
CALCIUM SERPL-MCNC: 7 MG/DL (ref 8.6–10.4)
CHLORIDE BLD-SCNC: 106 MMOL/L (ref 98–107)
CO2: 23 MMOL/L (ref 20–31)
CREAT SERPL-MCNC: 0.42 MG/DL (ref 0.5–0.9)
CULTURE: NO GROWTH
EKG ATRIAL RATE: 87 BPM
EKG P AXIS: 57 DEGREES
EKG P-R INTERVAL: 214 MS
EKG Q-T INTERVAL: 394 MS
EKG QRS DURATION: 108 MS
EKG QTC CALCULATION (BAZETT): 474 MS
EKG R AXIS: 65 DEGREES
EKG T AXIS: 74 DEGREES
EKG VENTRICULAR RATE: 87 BPM
EOSINOPHILS RELATIVE PERCENT: 0 % (ref 1–4)
GFR SERPL CREATININE-BSD FRML MDRD: >60 ML/MIN/1.73M2
GLUCOSE BLD-MCNC: 110 MG/DL (ref 70–99)
HCT VFR BLD CALC: 35.8 % (ref 36.3–47.1)
HEMOGLOBIN: 11.2 G/DL (ref 11.9–15.1)
IMMATURE GRANULOCYTES: 1 %
LYMPHOCYTES # BLD: 21 % (ref 24–43)
MAGNESIUM: 2 MG/DL (ref 1.6–2.6)
MCH RBC QN AUTO: 28.1 PG (ref 25.2–33.5)
MCHC RBC AUTO-ENTMCNC: 31.3 G/DL (ref 28.4–34.8)
MCV RBC AUTO: 89.7 FL (ref 82.6–102.9)
MONOCYTES # BLD: 6 % (ref 3–12)
NRBC AUTOMATED: 0 PER 100 WBC
PDW BLD-RTO: 15.9 % (ref 11.8–14.4)
PLATELET # BLD: 352 K/UL (ref 138–453)
PMV BLD AUTO: 10.4 FL (ref 8.1–13.5)
POTASSIUM SERPL-SCNC: 4.2 MMOL/L (ref 3.7–5.3)
PRO-BNP: 3543 PG/ML
RBC # BLD: 3.99 M/UL (ref 3.95–5.11)
RBC # BLD: ABNORMAL 10*6/UL
SEG NEUTROPHILS: 72 % (ref 36–65)
SEGMENTED NEUTROPHILS ABSOLUTE COUNT: 5.53 K/UL (ref 1.5–8.1)
SODIUM BLD-SCNC: 144 MMOL/L (ref 135–144)
SPECIMEN DESCRIPTION: NORMAL
WBC # BLD: 7.7 K/UL (ref 3.5–11.3)

## 2023-01-03 PROCEDURE — 6360000002 HC RX W HCPCS: Performed by: NURSE PRACTITIONER

## 2023-01-03 PROCEDURE — 6360000002 HC RX W HCPCS: Performed by: STUDENT IN AN ORGANIZED HEALTH CARE EDUCATION/TRAINING PROGRAM

## 2023-01-03 PROCEDURE — 6370000000 HC RX 637 (ALT 250 FOR IP): Performed by: INTERNAL MEDICINE

## 2023-01-03 PROCEDURE — 2580000003 HC RX 258: Performed by: NURSE PRACTITIONER

## 2023-01-03 PROCEDURE — 6370000000 HC RX 637 (ALT 250 FOR IP): Performed by: STUDENT IN AN ORGANIZED HEALTH CARE EDUCATION/TRAINING PROGRAM

## 2023-01-03 PROCEDURE — 83880 ASSAY OF NATRIURETIC PEPTIDE: CPT

## 2023-01-03 PROCEDURE — 6360000002 HC RX W HCPCS: Performed by: INTERNAL MEDICINE

## 2023-01-03 PROCEDURE — 2580000003 HC RX 258: Performed by: STUDENT IN AN ORGANIZED HEALTH CARE EDUCATION/TRAINING PROGRAM

## 2023-01-03 PROCEDURE — 2000000000 HC ICU R&B

## 2023-01-03 PROCEDURE — 99291 CRITICAL CARE FIRST HOUR: CPT | Performed by: INTERNAL MEDICINE

## 2023-01-03 PROCEDURE — 83735 ASSAY OF MAGNESIUM: CPT

## 2023-01-03 PROCEDURE — 94640 AIRWAY INHALATION TREATMENT: CPT

## 2023-01-03 PROCEDURE — 80048 BASIC METABOLIC PNL TOTAL CA: CPT

## 2023-01-03 PROCEDURE — 36415 COLL VENOUS BLD VENIPUNCTURE: CPT

## 2023-01-03 PROCEDURE — 2500000003 HC RX 250 WO HCPCS

## 2023-01-03 PROCEDURE — 85025 COMPLETE CBC W/AUTO DIFF WBC: CPT

## 2023-01-03 PROCEDURE — 6370000000 HC RX 637 (ALT 250 FOR IP): Performed by: NURSE PRACTITIONER

## 2023-01-03 PROCEDURE — 2700000000 HC OXYGEN THERAPY PER DAY

## 2023-01-03 PROCEDURE — 93005 ELECTROCARDIOGRAM TRACING: CPT | Performed by: STUDENT IN AN ORGANIZED HEALTH CARE EDUCATION/TRAINING PROGRAM

## 2023-01-03 PROCEDURE — 94761 N-INVAS EAR/PLS OXIMETRY MLT: CPT

## 2023-01-03 RX ORDER — CARBAMAZEPINE 100 MG/5ML
100 SUSPENSION ORAL 2 TIMES DAILY
Status: DISCONTINUED | OUTPATIENT
Start: 2023-01-03 | End: 2023-01-11 | Stop reason: HOSPADM

## 2023-01-03 RX ORDER — METHYLPREDNISOLONE SODIUM SUCCINATE 40 MG/ML
20 INJECTION, POWDER, LYOPHILIZED, FOR SOLUTION INTRAMUSCULAR; INTRAVENOUS EVERY 12 HOURS
Status: DISCONTINUED | OUTPATIENT
Start: 2023-01-03 | End: 2023-01-07

## 2023-01-03 RX ORDER — TOPIRAMATE 50 MG/1
25 TABLET, FILM COATED ORAL 2 TIMES DAILY
Status: DISCONTINUED | OUTPATIENT
Start: 2023-01-03 | End: 2023-01-11 | Stop reason: HOSPADM

## 2023-01-03 RX ADMIN — CARBAMAZEPINE 200 MG: 200 SUSPENSION ORAL at 11:46

## 2023-01-03 RX ADMIN — PREGABALIN 200 MG: 100 CAPSULE ORAL at 20:40

## 2023-01-03 RX ADMIN — ROPINIROLE HYDROCHLORIDE 1 MG: 1 TABLET, FILM COATED ORAL at 20:40

## 2023-01-03 RX ADMIN — CARVEDILOL 3.12 MG: 3.12 TABLET, FILM COATED ORAL at 16:43

## 2023-01-03 RX ADMIN — PANCRELIPASE LIPASE, PANCRELIPASE PROTEASE, PANCRELIPASE AMYLASE 40000 UNITS: 20000; 63000; 84000 CAPSULE, DELAYED RELEASE ORAL at 16:43

## 2023-01-03 RX ADMIN — DEXMEDETOMIDINE HYDROCHLORIDE 0.2 MCG/KG/HR: 4 INJECTION, SOLUTION INTRAVENOUS at 17:08

## 2023-01-03 RX ADMIN — BUSPIRONE HYDROCHLORIDE 30 MG: 15 TABLET ORAL at 08:52

## 2023-01-03 RX ADMIN — ENOXAPARIN SODIUM 40 MG: 100 INJECTION SUBCUTANEOUS at 08:58

## 2023-01-03 RX ADMIN — SUCRALFATE 1 G: 1 TABLET ORAL at 08:52

## 2023-01-03 RX ADMIN — METHYLPREDNISOLONE SODIUM SUCCINATE 20 MG: 40 INJECTION, POWDER, FOR SOLUTION INTRAMUSCULAR; INTRAVENOUS at 08:56

## 2023-01-03 RX ADMIN — PIPERACILLIN AND TAZOBACTAM 3375 MG: 3; .375 INJECTION, POWDER, FOR SOLUTION INTRAVENOUS at 00:44

## 2023-01-03 RX ADMIN — PIPERACILLIN AND TAZOBACTAM 3375 MG: 3; .375 INJECTION, POWDER, FOR SOLUTION INTRAVENOUS at 18:22

## 2023-01-03 RX ADMIN — CARBAMAZEPINE 200 MG: 200 SUSPENSION ORAL at 00:44

## 2023-01-03 RX ADMIN — FUROSEMIDE 20 MG: 10 INJECTION, SOLUTION INTRAMUSCULAR; INTRAVENOUS at 08:52

## 2023-01-03 RX ADMIN — SODIUM CHLORIDE, PRESERVATIVE FREE 10 ML: 5 INJECTION INTRAVENOUS at 09:17

## 2023-01-03 RX ADMIN — METHYLPREDNISOLONE SODIUM SUCCINATE 20 MG: 40 INJECTION, POWDER, FOR SOLUTION INTRAMUSCULAR; INTRAVENOUS at 20:40

## 2023-01-03 RX ADMIN — BUSPIRONE HYDROCHLORIDE 30 MG: 15 TABLET ORAL at 20:40

## 2023-01-03 RX ADMIN — PIPERACILLIN AND TAZOBACTAM 3375 MG: 3; .375 INJECTION, POWDER, FOR SOLUTION INTRAVENOUS at 08:45

## 2023-01-03 RX ADMIN — IPRATROPIUM BROMIDE AND ALBUTEROL SULFATE 1 AMPULE: .5; 3 SOLUTION RESPIRATORY (INHALATION) at 11:38

## 2023-01-03 RX ADMIN — TOPIRAMATE 50 MG: 50 TABLET, FILM COATED ORAL at 08:56

## 2023-01-03 RX ADMIN — PANCRELIPASE LIPASE, PANCRELIPASE PROTEASE, PANCRELIPASE AMYLASE 40000 UNITS: 20000; 63000; 84000 CAPSULE, DELAYED RELEASE ORAL at 14:37

## 2023-01-03 RX ADMIN — PANCRELIPASE LIPASE, PANCRELIPASE PROTEASE, PANCRELIPASE AMYLASE 40000 UNITS: 20000; 63000; 84000 CAPSULE, DELAYED RELEASE ORAL at 08:55

## 2023-01-03 RX ADMIN — DEXMEDETOMIDINE HYDROCHLORIDE 0.6 MCG/KG/HR: 4 INJECTION, SOLUTION INTRAVENOUS at 02:12

## 2023-01-03 RX ADMIN — PANTOPRAZOLE SODIUM 40 MG: 40 TABLET, DELAYED RELEASE ORAL at 08:52

## 2023-01-03 RX ADMIN — Medication 100 MG: at 08:52

## 2023-01-03 RX ADMIN — LISINOPRIL 5 MG: 5 TABLET ORAL at 08:52

## 2023-01-03 RX ADMIN — IPRATROPIUM BROMIDE AND ALBUTEROL SULFATE 1 AMPULE: .5; 3 SOLUTION RESPIRATORY (INHALATION) at 20:47

## 2023-01-03 RX ADMIN — MIRTAZAPINE 15 MG: 15 TABLET, FILM COATED ORAL at 20:40

## 2023-01-03 RX ADMIN — TOPIRAMATE 25 MG: 25 TABLET, FILM COATED ORAL at 20:41

## 2023-01-03 RX ADMIN — CARBAMAZEPINE 100 MG: 100 SUSPENSION ORAL at 20:40

## 2023-01-03 RX ADMIN — FUROSEMIDE 20 MG: 10 INJECTION, SOLUTION INTRAMUSCULAR; INTRAVENOUS at 17:50

## 2023-01-03 RX ADMIN — SUCRALFATE 1 G: 1 TABLET ORAL at 16:43

## 2023-01-03 RX ADMIN — LORAZEPAM 4 MG: 2 INJECTION INTRAMUSCULAR at 21:25

## 2023-01-03 RX ADMIN — LORAZEPAM 2 MG: 2 INJECTION INTRAMUSCULAR; INTRAVENOUS at 16:38

## 2023-01-03 RX ADMIN — SODIUM CHLORIDE, PRESERVATIVE FREE 10 ML: 5 INJECTION INTRAVENOUS at 09:02

## 2023-01-03 RX ADMIN — IPRATROPIUM BROMIDE AND ALBUTEROL SULFATE 1 AMPULE: .5; 3 SOLUTION RESPIRATORY (INHALATION) at 15:58

## 2023-01-03 RX ADMIN — CARVEDILOL 3.12 MG: 3.12 TABLET, FILM COATED ORAL at 08:58

## 2023-01-03 RX ADMIN — ATORVASTATIN CALCIUM 20 MG: 20 TABLET, FILM COATED ORAL at 08:55

## 2023-01-03 NOTE — PROGRESS NOTES
Physical Therapy        Physical Therapy Cancel Note      DATE: 1/3/2023    NAME: Adithya Bustamante  MRN: [id number]   : 1970      Patient not seen this date for Physical Therapy due to:    Patient is not appropriate for PT evaluation/treatment at this time d/t Pt on high flow with drop in SPO2 to 86 with minimal talking, PT to continue to follow and further address as indicated.       Electronically signed by Leigh Yates PT on 1/3/2023 at 3:34 PM

## 2023-01-03 NOTE — PLAN OF CARE
Problem: Discharge Planning  Goal: Discharge to home or other facility with appropriate resources  1/2/2023 2301 by Zoie Grey RN  Outcome: Progressing  1/2/2023 1617 by Shun Nguyen RN  Outcome: Progressing     Problem: Pain  Goal: Verbalizes/displays adequate comfort level or baseline comfort level  1/2/2023 2301 by Zoie Grey RN  Outcome: Progressing  1/2/2023 1617 by Shun Nguyen RN  Outcome: Progressing     Problem: Skin/Tissue Integrity  Goal: Absence of new skin breakdown  Description: 1. Monitor for areas of redness and/or skin breakdown  2. Assess vascular access sites hourly  3. Every 4-6 hours minimum:  Change oxygen saturation probe site  4. Every 4-6 hours:  If on nasal continuous positive airway pressure, respiratory therapy assess nares and determine need for appliance change or resting period.   1/2/2023 1617 by Shun Nguyen RN  Outcome: Progressing     Problem: Nutrition Deficit:  Goal: Optimize nutritional status  1/2/2023 1617 by Shun Nguyen RN  Outcome: Progressing

## 2023-01-03 NOTE — PROGRESS NOTES
INTENSIVE CARE UNIT  Resident Physician Progress Note    Patient - Shaheen Mack  Date of Admission -  2023 12:55 PM  Date of Evaluation -  1/3/2023  Room and Bed Number -  9252/5163-63   Hospital Day - 2    Chief Complaint   Patient presents with    Fatigue    Fall    Respiratory Distress      SUBJECTIVE:     OVERNIGHT EVENTS: No acute event overnight. Patient was afebrile. T-max 98. 3. Continuing on  HIGH FLOWnasal cannula and did not require BiPAP. We are cutting down on the Precedex drip. Currently she is on 0.6 of Precedex. TODAY: Patient is ANO x3. She would respond to command. Was agitated. Lab examination shows normal ammonia level, troponin downtrending from 15-16. Ultrasound gallbladder yesterday showed parenchymal changes in the liver suggestive of underlying cirrhosis. Continuing on Zosyn. WBC count is normalized. EKG and echo has been suggested by cardiology. We will follow-up with the result. Hemodynamically stable with a BP of 121/79  Total intake+469.4. Total output 1890. Net -1.4 L    Interval history on 1/3/2023: Solumedrol was changed to 20 twice daily, we have cut down on the carbamazepine and Topamax. We will get a chest x-ray tomorrow morning and reevaluate.     AWAKE & FOLLOWING COMMANDS:  [x] No   [x] Yes    SECRETIONS Amount:  [x] Small [] Moderate  [] Large  [] None  Color:     [] White [] Colored  [] Bloody    SEDATION:  RAAS Score:  [] Propofol gtt  [] Versed gtt  [] Ativan gtt   [x] No Sedation on Precedex    PARALYZED:  [x] No    [] Yes    VASOPRESSORS:  [x] No    [] Yes  [] Levophed [] Dopamine [] Vasopressin  [] Dobutamine [] Phenylephrine [] Epinephrine      OBJECTIVE:     VITAL SIGNS:  /79   Pulse 58   Temp 98.3 °F (36.8 °C) (Oral)   Resp 16   Ht 5' 6\" (1.676 m)   Wt 141 lb 8.6 oz (64.2 kg)   SpO2 97%   BMI 22.84 kg/m²   Tmax over 24 hours:  Temp (24hrs), Av.2 °F (36.8 °C), Min:97.7 °F (36.5 °C), Max:99 °F (37.2 °C)      Patient Vitals for the past 8 hrs:   BP Temp Temp src Pulse Resp SpO2   01/03/23 0826 -- -- -- 58 16 97 %   01/03/23 0741 -- 98.3 °F (36.8 °C) Oral -- -- --   01/03/23 0600 129/79 97.9 °F (36.6 °C) Axillary 59 15 96 %   01/03/23 0545 -- -- -- 58 15 95 %   01/03/23 0530 -- -- -- 59 17 97 %   01/03/23 0515 -- -- -- 58 16 97 %   01/03/23 0500 122/77 -- -- 62 21 93 %   01/03/23 0445 -- -- -- 58 16 97 %   01/03/23 0430 -- -- -- 60 21 97 %   01/03/23 0415 -- -- -- 59 16 98 %   01/03/23 0400 (!) 144/84 97.9 °F (36.6 °C) Axillary 59 16 97 %   01/03/23 0345 -- -- -- 60 20 95 %   01/03/23 0330 -- -- -- 59 17 98 %   01/03/23 0315 -- -- -- 59 16 98 %   01/03/23 0300 (!) 148/87 -- -- 60 18 98 %   01/03/23 0258 -- -- -- 59 17 98 %   01/03/23 0245 -- -- -- 61 16 98 %   01/03/23 0230 -- -- -- 60 17 98 %   01/03/23 0215 -- -- -- 60 16 98 %   01/03/23 0200 (!) 147/83 -- -- 61 17 98 %   01/03/23 0145 -- -- -- 61 15 98 %   01/03/23 0130 -- -- -- 60 17 99 %   01/03/23 0115 -- -- -- 61 16 99 %   01/03/23 0100 (!) 150/89 -- -- 61 16 99 %         Intake/Output Summary (Last 24 hours) at 1/3/2023 0850  Last data filed at 1/3/2023 0612  Gross per 24 hour   Intake 416.38 ml   Output 1840 ml   Net -1423.62 ml     Date 01/03/23 0000 - 01/03/23 2359   Shift 8167-6637 5594-4856 [id number] 24 Hour Total   INTAKE   I.V.(mL/kg) 90.8(1.4)   90.8(1.4)   IV Piggyback(mL/kg) 44.8(0.7)   44.8(0.7)   Shift Total(mL/kg) 135.6(2.1)   135.6(2.1)   OUTPUT   Urine(mL/kg/hr) 340(0.7)   340   Shift Total(mL/kg) 340(5.3)   340(5.3)   Weight (kg) 64.2 64.2 64.2 64.2     Wt Readings from Last 3 Encounters:   01/01/23 141 lb 8.6 oz (64.2 kg)   11/01/22 152 lb (68.9 kg)   09/01/22 146 lb 9.6 oz (66.5 kg)     Body mass index is 22.84 kg/m².         PHYSICAL EXAM:  GEN:  Patient is alert and oriented but agitated  EYES:  Lids and lashes normal, pupils equal, round and reactive to light, extra ocular muscles intact, sclera clear, conjunctiva normal  HEENT:  Normocephalic, without obvious abnormality, atramatic, sinuses nontender on palpation, external ears without lesions, oral pharynx with moist mucus membranes, tonsils without erythema or exudates, gums normal and good dentition.   LUNGS:  No increased work of breathing, good air exchange, clear to auscultation bilaterally, no crackles or wheezing  CV:    Normal apical impulse, regular rate and rhythm, normal S1 and S2, no S3 or S4, and no murmur noted  ABDOMEN:   No scars, normal bowel sounds, soft, non-distended, non-tender, no masses palpated, no hepatosplenomegaly    EXTREMITIES:  No pedal or leg edema, no calf tenderness/swelling, no erythema, distal pulses intact       MEDICATIONS:  Scheduled Meds:   furosemide  20 mg IntraVENous BID    ipratropium-albuterol  1 ampule Inhalation Q4H WA    methylPREDNISolone  20 mg IntraVENous Daily    carBAMazepine  200 mg Oral TID    piperacillin-tazobactam  3,375 mg IntraVENous Q8H    atorvastatin  20 mg Oral Daily    busPIRone  30 mg Oral BID    Pancrelipase (Lip-Prot-Amyl)  40,000 Units Oral TID WC    mirtazapine  15 mg Oral Nightly    pantoprazole  40 mg Oral QAM AC    pregabalin  200 mg Oral Nightly    rOPINIRole  1 mg Oral Nightly    sucralfate  1 g Oral 4x Daily    topiramate  50 mg Oral BID    sodium chloride flush  5-40 mL IntraVENous 2 times per day    enoxaparin  40 mg SubCUTAneous Daily    lisinopril  5 mg Oral Daily    carvedilol  3.125 mg Oral BID WC    sodium chloride flush  5-40 mL IntraVENous 2 times per day    thiamine  100 mg Oral Daily    ondansetron  4 mg IntraVENous Once    magnesium sulfate  2,000 mg IntraVENous Once     Continuous Infusions:   sodium chloride 10 mL/hr at 01/03/23 0612    dexmedetomidine 0.5 mcg/kg/hr (01/03/23 0846)    sodium chloride 10 mL/hr at 01/01/23 2312     PRN Meds:   albuterol sulfate HFA, 2 puff, 4x Daily PRN  hydrOXYzine HCl, 50 mg, TID PRN  sodium chloride flush, 10 mL, PRN  sodium chloride, , PRN  ondansetron, 4 mg, Q8H PRN   Or  ondansetron, 4 mg, Q6H PRN  polyethylene glycol, 17 g, Daily PRN  acetaminophen, 650 mg, Q6H PRN   Or  acetaminophen, 650 mg, Q6H PRN  potassium chloride, 40 mEq, PRN   Or  potassium alternative oral replacement, 40 mEq, PRN   Or  potassium chloride, 10 mEq, PRN  magnesium sulfate, 2,000 mg, PRN  sodium chloride flush, 5-40 mL, PRN  sodium chloride, , PRN  LORazepam, 1 mg, Q1H PRN   Or  LORazepam, 1 mg, Q1H PRN   Or  LORazepam, 2 mg, Q1H PRN   Or  LORazepam, 2 mg, Q1H PRN   Or  LORazepam, 3 mg, Q1H PRN   Or  LORazepam, 3 mg, Q1H PRN   Or  LORazepam, 4 mg, Q1H PRN   Or  LORazepam, 4 mg, Q1H PRN        SUPPORT DEVICES: [] Ventilator [] BIPAP  [] Nasal Cannula [] Room Air         Additional Respiratory Assessments  Heart Rate: 58  Resp: 16  SpO2: 97 %  Humidification Source: Heated wire  Humidification Temp: 33    ABGs:     Lab Results   Component Value Date/Time    DGY4SYP NOT REPORTED 07/26/2021 03:38 PM    FIO2 100.0 01/01/2023 05:19 PM         DATA:  Complete Blood Count:   Recent Labs     01/01/23  1312 01/02/23 0403 01/03/23  0428   WBC 17.5* 14.0* 7.7   RBC 3.86* 3.67* 3.99   HGB 11.2* 10.4* 11.2*   HCT 35.3* 33.5* 35.8*   MCV 91.5 91.3 89.7   MCH 29.0 28.3 28.1   MCHC 31.7 31.0 31.3   RDW 16.4* 16.1* 15.9*    332 352   MPV 10.6 10.6 10.4        Last 3 Blood Glucose:   Recent Labs     01/01/23  1312 01/02/23 0403 01/03/23  0428   GLUCOSE 121* 89 110*        PT/INR:    Lab Results   Component Value Date/Time    PROTIME 11.1 01/01/2023 01:12 PM    INR 1.0 01/01/2023 01:12 PM     PTT:    Lab Results   Component Value Date/Time    APTT 25.8 01/01/2023 01:12 PM       Comprehensive Metabolic Profile:   Recent Labs     01/01/23  1312 01/01/23  1316 01/02/23  0403 01/03/23  0428     --  134* 144   K 5.3  --  4.5 4.2     --  106 106   CO2 18*  --  19* 23   BUN 37*  --  38* 27*   CREATININE 0.69 0.71 0.54 0.42*   GLUCOSE 121*  --  89 110*   CALCIUM 6.8*  --  7.5* 7.0*   PROT 6.6  --  5.5*  --    LABALBU 3.1*  --  2.3*  --   BILITOT 0.7  --  0.6  --    ALKPHOS 197*  --  168*  --    AST 67*  --  64*  --    ALT 19  --  23  --       Magnesium:   Lab Results   Component Value Date/Time    MG 2.0 01/03/2023 04:28 AM    MG 2.9 01/02/2023 04:03 AM    MG 2.2 01/01/2023 01:12 PM     Phosphorus:   Lab Results   Component Value Date/Time    PHOS 3.7 07/23/2021 06:29 AM    PHOS 2.5 10/27/2019 08:33 PM    PHOS 4.0 07/14/2019 05:29 AM     Ionized Calcium:   Lab Results   Component Value Date/Time    CAION 0.94 01/01/2023 08:21 PM    CAION 0.93 01/01/2023 03:05 PM    CAION 1.30 07/22/2021 03:22 AM        Urinalysis:   Lab Results   Component Value Date/Time    NITRU NEGATIVE 01/01/2023 01:54 PM    COLORU DARK YELLOW 01/01/2023 01:54 PM    PHUR 5.5 01/01/2023 01:54 PM    WBCUA 2 TO 5 01/01/2023 01:54 PM    RBCUA 0 TO 2 01/01/2023 01:54 PM    MUCUS 2+ 09/01/2022 11:50 AM    TRICHOMONAS NOT REPORTED 10/12/2021 04:15 PM    YEAST MANY 01/01/2023 01:54 PM    BACTERIA MODERATE 01/01/2023 01:54 PM    CLARITYU cloudy 12/18/2018 03:46 PM    SPECGRAV 1.020 01/01/2023 01:54 PM    LEUKOCYTESUR SMALL 01/01/2023 01:54 PM    UROBILINOGEN ELEVATED 01/01/2023 01:54 PM    BILIRUBINUR NEGATIVE  Verified by ictotest. 01/01/2023 01:54 PM    BILIRUBINUR moderate 12/18/2018 03:46 PM    BLOODU neg 12/18/2018 03:46 PM    GLUCOSEU NEGATIVE 01/01/2023 01:54 PM    KETUA SMALL 01/01/2023 01:54 PM    AMORPHOUS 2+ 09/01/2022 11:50 AM       HgBA1c:    Lab Results   Component Value Date/Time    LABA1C 5.5 04/13/2021 11:34 AM     TSH:    Lab Results   Component Value Date/Time    TSH 1.40 01/02/2023 04:03 AM     Lactic Acid: No results found for: LACTA   Troponin: No results for input(s): TROPONINI in the last 72 hours.         ASSESSMENT:     Patient Active Problem List    Diagnosis Date Noted    Acute on chronic respiratory failure with hypoxia ([facility] [place] [address] 01/01/2023    Intractable migraine without aura and without status migrainosus 05/24/2022    Sleep disturbance 05/04/2022    Moderate episode of recurrent major depressive disorder (HCC) 12/14/2021    Adenomatous polyp of sigmoid colon     Hyponatremia 10/12/2021    Iron deficiency anemia 10/12/2021    Sepsis (HCC) 08/09/2021    Acute recurrent pancreatitis 08/09/2021    Atelectasis of left lung 08/09/2021    Fluid collection of pancreas     Diverticulitis of large intestine without perforation or abscess with bleeding     Pseudocyst of pancreas     Alcohol-induced acute pancreatitis 07/31/2021    Community acquired pneumonia of left lower lobe of lung 07/29/2021    Septicemia (HCC)     Metabolic acidosis with increased anion gap and accumulation of organic acids 07/28/2021    Compression fracture of thoracic vertebra (HCC)     Pathological compression fracture of lumbar vertebra with delayed healing     Closed fracture of fifth thoracic vertebra (HCC) 07/22/2021    Diverticulitis of large intestine with abscess without bleeding 07/22/2021    Elevated alkaline phosphatase level 07/22/2021    Chronic pancreatitis (HCC) 07/22/2021    Erythema ab igne 07/22/2021    Severe sepsis (HCC) 07/21/2021    Multilevel spine pain 06/30/2021    Chronic pain syndrome 06/30/2021    Marijuana abuse 06/30/2021    Chronic peripheral neuropathic pain 05/04/2021    History of alcohol abuse 05/04/2021    History of petit-mal seizures 05/04/2021    Intractable episodic tension-type headache 05/04/2021    Personal history of astrocytoma 05/04/2021    Esophageal dysphagia     Major depressive disorder, recurrent severe without psychotic features (HCC) 03/31/2021    AMAN (generalized anxiety disorder) 03/22/2021    Perineal mass, female 10/23/2020    Esophagitis candidal  10/23/2020    Condyloma 10/23/2020    Astrocytoma (HCC) - diagnosed at age 17, the patient underwent 2 surgical resections without known recurrence 10/23/2020    Alcohol use disorder, severe, in early remission (HCC) 10/23/2020    Metabolic encephalopathy     Recurrent major depressive disorder (HCC)  10/20/2020    Elevated CA 19-9 level 10/20/2020    Pancreatic cyst 10/19/2020    Acute respiratory failure with hypoxia ([facility] [address] 10/16/2020    Paresthesia of lower extremity 10/13/2020    COPD with acute exacerbation ([facility] [address] 10/12/2020    Seizure disorder ([facility] [address]     Ambulatory dysfunction 12/17/2019    Tobacco use     Hypomagnesemia 05/57/3916    Alcoholic peripheral neuropathy ([facility] [address] 12/14/2019    Hypokalemia 12/14/2019    Macrocytic anemia 12/14/2019    Psychophysiological insomnia 06/05/2019    Anxiety 06/05/2019    Essential hypertension 08/13/2015    Nicotine addiction 08/13/2015    Reflex sympathetic dystrophy of lower limb 03/27/2014    Acute bronchitis 10/02/2012          PLAN:     WEAN PER PROTOCOL:  [x] No   [] Yes  [] N/A    ICU PROPHYLAXIS:  Stress ulcer:  [x] PPI Agent  [] N8Helmp [] Sucralfate  [] Other:  VTE:   [] Enoxaparin  [] Unfract.  Heparin Subcut  [x] EPC Cuffs    NUTRITION:  [] NPO [] Tube Feeding (Specify: ) [] TPN  [x] PO    HOME MEDS RECONCILED: [] No  [x] Yes    CONSULTATION NEEDED:  [] No  [x] Yes    FAMILY UPDATED:    [] No  [x] Yes    TRANSFER OUT OF ICU:   [x] No  [] Yes    Plan:    Neuro  Metabolic encephalopathy  Hx alcohol abuse, hx chronic pancreatitis  Precedex gtt  Hx anxiety, on atarax at home  Hx COPD, on 4 L NC at home, has not been compliant  Seizure precautions  CIWA protocol  Buspar 30 BID  Carbamazepine 200 TID, Topamax 50 BID  Remeron 15  Pregabalin 200  Requip 1 mg     Cardiology  Cardiology consulted  EKG 1/1 with T wave inversions laterally  Avoid Qtc prolonging medications  Hx seizure disorder on carbamazepine  Lipitor  Coreg 3.125 BID  Lisinopril 5 daily     Pulmonary  6 L NC  Spiriva  Symbicort  Monitor respiratory status  ABG PRN     Renal  Monitor intake output  Cr 0.54     GI  Adult diet, no added salt  Hx alcohol abuse with chronic pancreatitis  Ammonia 69  Lactulose 20 g TID  Lipase-protease-amylase 30,000 TID  Pancrelipase 40,000 TID  CT abdomen showing possible pericholecystic fluid, ultrasonogram right upper quadrant and gallbladder showed a cystlike etiology alcoholic  Protonix 40   Carafate 1 g QID  Thiamine  Alk phos elevated AST to ALT ratio >2 indicating alcoholic cirrhosis     ID  Zosyn  Given vanco and zosyn in ED  WBC 14     Endo  Glucose 89       Oni Soria MD  Internal Medicine Resident,PG[facility] [address]   Attending Physician Statement  I have discussed the care of Claudetta Speed, including pertinent history and exam findings,  with the resident. I have seen and examined the patient and the key elements of all parts of the encounter have been performed by me. I agree with the assessment, plan and orders as documented by the resident with additions . Meds adjusted   Cont high flow o2 - keep sats 88-92 %   Wean precedex gtt   High risk for intubation          Total critical care time caring for this patient with life threatening, unstable organ failure, including direct patient contact, management of life support systems, review of data including imaging and labs, discussions with other team members and physicians at least 27   Min so far today, excluding procedures. Electronically signed by Rhonda Rice MD on   1/3/23 at 5:34 PM EST    Please note that this chart was generated using voice recognition Dragon dictation software. Although every effort was made to ensure the accuracy of this automated transcription, some errors in transcription may have occurred.

## 2023-01-03 NOTE — CARE COORDINATION
Consult received for consideration of rehab. Pt with sitter at bedside. Advised pt on precedex. Will f/u with pt once she is alert/oriented.

## 2023-01-03 NOTE — PLAN OF CARE
Problem: Safety - Medical Restraint  Goal: Remains free of injury from restraints (Restraint for Interference with Medical Device)  Recent Flowsheet Documentation  Taken 1/3/2023 0400 by Racheal Dela Cruz RN  Remains free of injury from restraints (restraint for interference with medical device): Every 2 hours: Monitor safety, psychosocial status, comfort, nutrition and hydration     Problem: Safety - Medical Restraint  Goal: Remains free of injury from restraints (Restraint for Interference with Medical Device)  Recent Flowsheet Documentation  Taken 1/3/2023 0400 by Racheal Dela Cruz RN  Remains free of injury from restraints (restraint for interference with medical device): Every 2 hours: Monitor safety, psychosocial status, comfort, nutrition and hydration     Problem: Discharge Planning  Goal: Discharge to home or other facility with appropriate resources  Outcome: Progressing     Problem: Pain  Goal: Verbalizes/displays adequate comfort level or baseline comfort level  Outcome: Progressing     Problem: Skin/Tissue Integrity  Goal: Absence of new skin breakdown  Outcome: Progressing     Problem: Nutrition Deficit:  Goal: Optimize nutritional status  Outcome: Progressing     Problem: Respiratory - Adult  Goal: Achieves optimal ventilation and oxygenation  1/3/2023 1630 by Sol Winchester RN  Outcome: Progressing

## 2023-01-03 NOTE — CARE COORDINATION
Case Management Assessment  Initial Evaluation    Date/Time of Evaluation: 1/3/2023 2:07 PM  Assessment Completed by: Yaw Mercado RN    If patient is discharged prior to next notation, then this note serves as note for discharge by case management. Patient Name: Leticia Garcia                   YOB: 1970  Diagnosis: Acute on chronic respiratory failure with hypoxia ([facility] [address] [J96.21]                   Date / Time: 1/1/2023 12:55 PM    Patient Admission Status: Inpatient   Readmission Risk (Low < 19, Mod (19-27), High > 27): Readmission Risk Score: 16.3    Current PCP: LIZBETH CAIN MD  PCP verified by CM? (P) Yes    Chart Reviewed: Yes      History Provided by: (P) Child/Family  Patient Orientation: (P) Other (see comment) (confused)    Patient Cognition: (P) Alert    Hospitalization in the last 30 days (Readmission):  No    If yes, Readmission Assessment in CM Navigator will be completed.     Advance Directives:      Code Status: Full Code   Patient's Primary Decision Maker is: (P) Legal Next of Kin    Primary Decision Maker: Rebeca Morrow - Parent - [phone]    Secondary Decision Maker: Alf Hernandez - Brother/Sister - [phone]    Discharge Planning:    Patient lives with: (P) Alone Type of Home: (P) House  Primary Care Giver: (P) Self  Patient Support Systems include: (P) Parent   Current Financial resources:    Current community resources:    Current services prior to admission: (P) None            Current DME:              Type of Home Care services:  (P) None    ADLS  Prior functional level: (P) Independent in ADLs/IADLs  Current functional level: (P) Independent in ADLs/IADLs    PT AM-PAC:   /24  OT AM-PAC:   /24    Family can provide assistance at DC: (P) Yes  Would you like Case Management to discuss the discharge plan with any other family members/significant others, and if so, who? (P) Yes (mom)  Plans to Return to Present Housing: (P) Yes  Other Identified Issues/Barriers to RETURNING to current housing: TBD  Potential Assistance needed at discharge: (P) Other (Comment) (tbd)            Potential DME:    Patient expects to discharge to: (P) [facility] for transportation at discharge: (P) Family    Financial    Payor: [employer] / Plan: Adrian Michelle / Product Type: *No Product type* /     Does insurance require precert for SNF: Yes    Potential assistance Purchasing Medications: (P) No  Meds-to-Beds request: [facility] [id number] [address]  [address] [facility]  [address] [facility] [place]  Phone: [phone] Fax: [phone]    [facility], [address] [facility] [phone]  [address] [place] [address]  Phone: [phone] Fax: [phone]      Notes:    Factors facilitating achievement of predicted outcomes: Family support    Barriers to discharge: Confusion    Additional Case Management Notes: Plan for home, mom for ride, monitor for SNF/HC needs. Used [facility] in the past and liked it. The Plan for Transition of Care is related to the following treatment goals of Acute on chronic respiratory failure with hypoxia ([facility] [address] [E31.14]    IF APPLICABLE: The Patient and/or patient representative [facility] and her family were provided with a choice of provider and agrees with the discharge plan. Freedom of choice list with basic dialogue that supports the patient's individualized plan of care/goals and shares the quality data associated with the providers was provided to:     Patient Representative Name:       The Patient and/or Patient Representative Agree with the Discharge Plan?       Yana Le RN  Case Management Department  Ph: [phone] Fax:

## 2023-01-03 NOTE — PROGRESS NOTES
[facility]   Consult Note                 Date:   1/3/2023  Patient name: Estella Aguilar  Date of admission:  1/1/2023 12:55 PM  MRN:   [id number]  YOB: 1970    Reason for Consult: Elevated troponin    CHIEF COMPLAINT: AMS    History Obtained From:  Patient       SUBJECTIVE:  Sinus bradycardia this am  /95  Awaiting EKG and echo  Patient's mentation improving slowly, still on precedex gtt  Troponins 25>>26>>16>>15  1.9 L UOP on IV lasix    HISTORY OF PRESENT ILLNESS:    The patient is a 47 y.o. female presenting on January 1 after being found altered mental status at home after home oxygen. In the ED, CT chest showed signs of groundglass opacities and pulmonary venous congestion and was placed on nonrebreather. Patient suspected to have sepsis on admission, and was found to have elevated troponins. Patient has a past medical history for COPD on home oxygen, MDD, ethanol abuse, chronic pancreatitis, seizure disorder, hypertension, active smoker, polysubstance abuse. Past Medical History:   has a past medical history of Acute respiratory failure with hypoxia ([address] Alcohol withdrawal syndrome, with delirium ([address] Alcoholism ([address] Anal warts, Anemia, Anxiety, 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24, the patient underwent 2 surgical resections without known recurrence, Bandemia, Closed fracture of lateral portion of left tibial plateau, Condyloma, COPD (chronic obstructive pulmonary disease) ([address] Depression, Dysphagia, GI bleed, Hypertension, Memory loss, Oxygen dependent, Pain, joint, ankle and foot, Pancreatic lesion, Perianal wart, Peripheral neuropathy, Seizures ([address] Tension headache, Under care of team, Under care of team, Under care of team, Under care of team, Under care of team, Under care of team, Wears glasses, Wears partial dentures, and Wellness examination. Past Surgical History:   has a past surgical history that includes Hysterectomy (2003);  Brain tumor excision (1989); fracture surgery (Left, 07/03/2013); fracture surgery (Right); Upper gastrointestinal endoscopy (N/A, 10/22/2020); Colonoscopy (N/A, 10/22/2020); Endoscopic ultrasonography, GI (N/A, 12/09/2020); Upper gastrointestinal endoscopy (N/A, 04/05/2021); Hand surgery; CT ABSCESS DRAINAGE (07/28/2021); ERCP (N/A, 08/11/2021); ERCP (08/11/2021); Upper gastrointestinal endoscopy (N/A, 09/08/2021); Spine surgery (N/A, 09/10/2021); ERCP (N/A, 10/21/2021); ERCP (10/21/2021); ERCP (10/21/2021); Colonoscopy (N/A, 11/19/2021); Anus surgery (N/A, 01/25/2022); endoscopic ultrasound (upper) (02/09/2022); Upper gastrointestinal endoscopy (N/A, 2/9/2022); and Upper gastrointestinal endoscopy (2/9/2022). Home Medications:    Prior to Admission medications    Medication Sig Start Date End Date Taking? Authorizing Provider   lipase-protease-amylase (CREON) 19569-50395 units delayed release capsule TAKE ONE CAPSULE BY MOUTH THREE TIMES A DAY WITH A MEAL 12/19/22   Ludivina Magana MD   baclofen (LIORESAL) 10 MG tablet Take 1 tablet by mouth 3 times daily 12/14/22   Ludivina Magana MD   metoclopramide (REGLAN) 5 MG tablet TAKE ONE TABLET BY MOUTH THREE TIMES A DAY 11/15/22   Flora Riley MD   fluticasone-umeclidin-vilant (TRELEGY ELLIPTA) 532-04.5-76 MCG/INH AEPB Inhale 1 puff into the lungs daily 11/1/22   Ivette Boas, APRN - CNP   pregabalin (LYRICA) 200 MG capsule Take 1 capsule by mouth nightly for 30 days.  11/1/22 12/1/22  Ivette Boas, APRN - CNP   albuterol sulfate HFA (VENTOLIN HFA) 108 (90 Base) MCG/ACT inhaler Inhale 2 puffs into the lungs 4 times daily as needed for Wheezing 11/1/22   Darlyne Boas, APRN - CNP   amLODIPine (NORVASC) 10 MG tablet TAKE ONE TABLET BY MOUTH DAILY 10/17/22   Ludivina Magana MD   verapamil (CALAN SR) 120 MG extended release tablet Take 1 tablet by mouth daily 10/13/22   LOI Dupont CNP   varenicline (CHANTIX) 1 MG tablet TAKE ONE TABLET BY MOUTH TWICE A DAY (START AFTER FINISHING TAPER) 10/3/22   Ludivina Joseph MD   hydrOXYzine HCl (ATARAX) 50 MG tablet  9/13/22   Historical Provider, MD   omeprazole (PRILOSEC) 40 MG delayed release capsule Take 1 capsule by mouth in the morning and at bedtime 9/19/22   Gordy Garcia MD   sucralfate (CARAFATE) 1 GM tablet Take 1 tablet by mouth 4 times daily 9/19/22   Gordy Garcia MD   atorvastatin (LIPITOR) 20 MG tablet Take 1 tablet by mouth daily 9/6/22   LOI Ellison - CNP   mirtazapine (REMERON) 15 MG tablet  7/21/22   Elizabeth Dey   REXULTI 1 MG TABS tablet  8/9/22   Elizabeth Dey   topiramate (TOPAMAX) 50 MG tablet Take 1 tablet by mouth 2 times daily 8/29/22   LOI Ellison CNP   busPIRone (BUSPAR) 30 MG tablet Take 1 tablet by mouth in the morning and at bedtime 7/21/22   Elizabeth Dey   solifenacin (VESICARE) 10 MG tablet Take 1 tablet by mouth in the morning. 7/20/22   Xiomara MASTERS MD   rOPINIRole (REQUIP) 1 MG tablet TAKE ONE TABLET BY MOUTH ONCE NIGHTLY 7/11/22   LOI Roland - NP   carBAMazepine (TEGRETOL) 200 MG tablet TAKE ONE TABLET BY MOUTH THREE TIMES A DAY 7/11/22   LOI Roland NP   Handicap Placard MISC by Does not apply route Expires 5/2027 5/9/22   Ludivina Joseph MD   prazosin (MINIPRESS) 1 MG capsule Take 1 capsule by mouth nightly 5/4/22   Shilo Isbell APRN - JW   nicotine polacrilex (NICOTINE MINI) 4 MG lozenge Take 1 lozenge by mouth as needed for Smoking cessation Weeks 1 to 6: 1 lozenge every 1 to 2 hours. Weeks 7 to 9: 1 lozenge every 2 to 4 hours. Weeks 10 to 12: 1 lozenge every 4 to 8 hours. Maximum 20 lozenges per day.  4/7/22   LOI Roland NP   sodium chloride 1 g tablet Take 1 tablet by mouth 4 times daily Note increase in dose per Dr Michael Harrison 4/4/22   MD quan John (SENOKOT) 8.6 MG tablet Take 1 tablet by mouth 2 times daily 1/25/22 1/25/23  Angelica Vasquez DO   lidocaine (XYLOCAINE) 5 % ointment Apply topically as needed. 1/25/22   Kurtis Pastrana DO   simethicone (MYLICON) 80 MG chewable tablet Take 1 tablet by mouth 4 times daily as needed for Flatulence 8/27/21   Len Mace MD   vitamin D (ERGOCALCIFEROL) 84074 units CAPS capsule Take 1 capsule by mouth once a week 6/19/19   Ludivina Jurado MD   folic acid (FOLVITE) 1 MG tablet Take 1 tablet by mouth daily 6/19/19   Ludivina Jurado MD       Allergies:  Patient has no known allergies. Social History:   reports that she has been smoking cigarettes. She has a 30.00 pack-year smoking history. She has never used smokeless tobacco. She reports that she does not currently use alcohol. She reports current drug use. Frequency: 2.00 times per week. Family History: family history includes Arthritis in her mother; Cancer in her maternal grandmother; Esophageal Cancer in her maternal aunt; Heart Disease in her paternal grandmother; Other in her father. No for premature CAD. No for h/o sudden cardiac death    REVIEW OF SYSTEMS:    Unable to assess ROS, altered. PHYSICAL EXAM:    Physical Examination:    /79   Pulse 58   Temp 98.3 °F (36.8 °C) (Oral)   Resp 16   Ht 5' 6\" (1.676 m)   Wt 141 lb 8.6 oz (64.2 kg)   SpO2 97%   BMI 22.84 kg/m²    Constitutional and General Appearance:  altered   HEENT: PERRL, no cervical lymphadenopathy  Respiratory:  Good respiratory effort. No for increased work of breathing. On auscultation: diminished to auscultation bilaterally, no basilar rales, no wheezing   Cardiovascular:  regular S1 and S2.   No murmur audible   No Jugular venous distention, no hepatojugular reflex  Peripheral pulses are symmetrical and full   Abdomen:  No masses or tenderness  Bowel sounds present  Extremities:   No Cyanosis or Clubbing   Lower extremity edema: No   Skin: Warm and dry  Neurological:  Not done       DATA:    Diagnostics:      EKG  1/2/23 sinus rhythm, first-degree AV block, Normal axis, T wave inversion in anterior leads, prolonged QTc    ECHO  7/22/21  Left ventricle is normal in size. Global left ventricular systolic function  is difficult to assess due to heart rate but appears normal. Calculated  ejection fraction 70% by Guadarrama's method. Visually estimated EF 60-65%. Left atrium is normal in size. Lipomatous atrial septum. No shunt seen by color Doppler. Normal right ventricular size and function. No significant valvular regurgitation or stenosis seen. Coronary angiography: Not done  Labs:     CBC:   Recent Labs     01/02/23  0403 01/03/23  0428   WBC 14.0* 7.7   HGB 10.4* 11.2*   HCT 33.5* 35.8*    352       BMP:   Recent Labs     01/02/23  0403 01/03/23  0428   * 144   K 4.5 4.2   CO2 19* 23   BUN 38* 27*   CREATININE 0.54 0.42*   LABGLOM >60 >60   GLUCOSE 89 110*       BNP: No results for input(s): BNP in the last 72 hours. PT/INR:   Recent Labs     01/01/23  1312   PROTIME 11.1   INR 1.0       APTT:  Recent Labs     01/01/23  1312   APTT 25.8       CARDIAC ENZYMES:No results for input(s): CKTOTAL, CKMB, CKMBINDEX, TROPONINI in the last 72 hours.   FASTING LIPID PANEL:  Lab Results   Component Value Date/Time    HDL 62 09/01/2022 11:49 AM    TRIG 130 09/01/2022 11:49 AM     LIVER PROFILE:  Recent Labs     01/01/23  1312 01/02/23  0403   AST 67* 64*   ALT 19 23   LABALBU 3.1* 2.3*           IMPRESSION:    Elevated troponin likely demand ischemia in the setting of sepsis  Acute CHF exacerbation  Hypertension  Anemia  COPD  MDD  Smoking history  Polysubstance abuse   chronic pancreatitis      RECOMMENDATIONS:  Follow up on EKG and echo  Avoid QTC prolonging medication  Continue IV lasix 20 mg BID  Educated on the risks of continuing smoking  Receiving Precedex for agitation  Continue on home blood pressure medication with holding parameters  Rest of care as per efrain Alvarado  PGY-3  Internal Medicine  [facility], [address] [facility]   1/3/2023 8:53 AM    I performed a history and physical examination of the patient and discussed management with the resident. I reviewed the residents note and agree with the documented findings and plan of care. Any areas of disagreement are noted on the chart. I was personally present for the key portions of any procedures. I have documented in the chart those procedures where I was not present during the key portions. I have personally evaluated this patient and have completed at least one if not all key elements of the E/M (history, physical exam, and MDM). Additional findings are as noted.     Laverne Gay MD

## 2023-01-04 ENCOUNTER — APPOINTMENT (OUTPATIENT)
Dept: GENERAL RADIOLOGY | Age: 53
DRG: 133 | End: 2023-01-04
Payer: MEDICARE

## 2023-01-04 LAB
ABSOLUTE EOS #: <0.03 K/UL (ref 0–0.44)
ABSOLUTE IMMATURE GRANULOCYTE: <0.03 K/UL (ref 0–0.3)
ABSOLUTE LYMPH #: 2 K/UL (ref 1.1–3.7)
ABSOLUTE MONO #: 0.46 K/UL (ref 0.1–1.2)
ALLEN TEST: POSITIVE
ANION GAP SERPL CALCULATED.3IONS-SCNC: 10 MMOL/L (ref 9–17)
BASOPHILS # BLD: 0 % (ref 0–2)
BASOPHILS ABSOLUTE: <0.03 K/UL (ref 0–0.2)
BUN BLDV-MCNC: 23 MG/DL (ref 6–20)
CALCIUM SERPL-MCNC: 7.2 MG/DL (ref 8.6–10.4)
CHLORIDE BLD-SCNC: 101 MMOL/L (ref 98–107)
CO2: 28 MMOL/L (ref 20–31)
CREAT SERPL-MCNC: 0.46 MG/DL (ref 0.5–0.9)
EKG ATRIAL RATE: 61 BPM
EKG P AXIS: 47 DEGREES
EKG P-R INTERVAL: 228 MS
EKG Q-T INTERVAL: 504 MS
EKG QRS DURATION: 102 MS
EKG QTC CALCULATION (BAZETT): 507 MS
EKG R AXIS: 37 DEGREES
EKG T AXIS: 35 DEGREES
EKG VENTRICULAR RATE: 61 BPM
EOSINOPHILS RELATIVE PERCENT: 0 % (ref 1–4)
FIO2: 100
GFR SERPL CREATININE-BSD FRML MDRD: >60 ML/MIN/1.73M2
GLUCOSE BLD-MCNC: 114 MG/DL (ref 70–99)
GLUCOSE BLD-MCNC: 121 MG/DL (ref 74–100)
HCT VFR BLD CALC: 36.1 % (ref 36.3–47.1)
HEMOGLOBIN: 11.8 G/DL (ref 11.9–15.1)
IMMATURE GRANULOCYTES: 0 %
LYMPHOCYTES # BLD: 32 % (ref 24–43)
MAGNESIUM: 1.8 MG/DL (ref 1.6–2.6)
MCH RBC QN AUTO: 28.9 PG (ref 25.2–33.5)
MCHC RBC AUTO-ENTMCNC: 32.7 G/DL (ref 28.4–34.8)
MCV RBC AUTO: 88.5 FL (ref 82.6–102.9)
MONOCYTES # BLD: 7 % (ref 3–12)
NRBC AUTOMATED: 0 PER 100 WBC
PDW BLD-RTO: 15.5 % (ref 11.8–14.4)
PLATELET # BLD: 371 K/UL (ref 138–453)
PMV BLD AUTO: 10.3 FL (ref 8.1–13.5)
POC HCO3: 31.8 MMOL/L (ref 21–28)
POC LACTIC ACID: 0.86 MMOL/L (ref 0.56–1.39)
POC O2 SATURATION: 99 % (ref 94–98)
POC PCO2: 60.8 MM HG (ref 35–48)
POC PH: 7.33 (ref 7.35–7.45)
POC PO2: 157 MM HG (ref 83–108)
POSITIVE BASE EXCESS, ART: 4 (ref 0–3)
POTASSIUM SERPL-SCNC: 3.8 MMOL/L (ref 3.7–5.3)
RBC # BLD: 4.08 M/UL (ref 3.95–5.11)
RBC # BLD: ABNORMAL 10*6/UL
SAMPLE SITE: ABNORMAL
SEG NEUTROPHILS: 61 % (ref 36–65)
SEGMENTED NEUTROPHILS ABSOLUTE COUNT: 3.84 K/UL (ref 1.5–8.1)
SODIUM BLD-SCNC: 139 MMOL/L (ref 135–144)
SOURCE: NORMAL
STREP PNEUMONIAE ANTIGEN: NEGATIVE
WBC # BLD: 6.3 K/UL (ref 3.5–11.3)

## 2023-01-04 PROCEDURE — 2580000003 HC RX 258: Performed by: STUDENT IN AN ORGANIZED HEALTH CARE EDUCATION/TRAINING PROGRAM

## 2023-01-04 PROCEDURE — 71045 X-RAY EXAM CHEST 1 VIEW: CPT

## 2023-01-04 PROCEDURE — 6360000002 HC RX W HCPCS

## 2023-01-04 PROCEDURE — 2500000003 HC RX 250 WO HCPCS

## 2023-01-04 PROCEDURE — 0BH18EZ INSERTION OF ENDOTRACHEAL AIRWAY INTO TRACHEA, VIA NATURAL OR ARTIFICIAL OPENING ENDOSCOPIC: ICD-10-PCS | Performed by: INTERNAL MEDICINE

## 2023-01-04 PROCEDURE — 6360000002 HC RX W HCPCS: Performed by: INTERNAL MEDICINE

## 2023-01-04 PROCEDURE — 94002 VENT MGMT INPAT INIT DAY: CPT

## 2023-01-04 PROCEDURE — 6370000000 HC RX 637 (ALT 250 FOR IP): Performed by: INTERNAL MEDICINE

## 2023-01-04 PROCEDURE — 83735 ASSAY OF MAGNESIUM: CPT

## 2023-01-04 PROCEDURE — 82947 ASSAY GLUCOSE BLOOD QUANT: CPT

## 2023-01-04 PROCEDURE — 2000000000 HC ICU R&B

## 2023-01-04 PROCEDURE — 2700000000 HC OXYGEN THERAPY PER DAY

## 2023-01-04 PROCEDURE — 82803 BLOOD GASES ANY COMBINATION: CPT

## 2023-01-04 PROCEDURE — 87205 SMEAR GRAM STAIN: CPT

## 2023-01-04 PROCEDURE — 2580000003 HC RX 258: Performed by: NURSE PRACTITIONER

## 2023-01-04 PROCEDURE — 6360000002 HC RX W HCPCS: Performed by: NURSE PRACTITIONER

## 2023-01-04 PROCEDURE — 83605 ASSAY OF LACTIC ACID: CPT

## 2023-01-04 PROCEDURE — 85025 COMPLETE CBC W/AUTO DIFF WBC: CPT

## 2023-01-04 PROCEDURE — 31500 INSERT EMERGENCY AIRWAY: CPT | Performed by: INTERNAL MEDICINE

## 2023-01-04 PROCEDURE — 5A1945Z RESPIRATORY VENTILATION, 24-96 CONSECUTIVE HOURS: ICD-10-PCS | Performed by: INTERNAL MEDICINE

## 2023-01-04 PROCEDURE — 94660 CPAP INITIATION&MGMT: CPT

## 2023-01-04 PROCEDURE — 6360000002 HC RX W HCPCS: Performed by: STUDENT IN AN ORGANIZED HEALTH CARE EDUCATION/TRAINING PROGRAM

## 2023-01-04 PROCEDURE — 2500000003 HC RX 250 WO HCPCS: Performed by: STUDENT IN AN ORGANIZED HEALTH CARE EDUCATION/TRAINING PROGRAM

## 2023-01-04 PROCEDURE — 99291 CRITICAL CARE FIRST HOUR: CPT | Performed by: INTERNAL MEDICINE

## 2023-01-04 PROCEDURE — 94640 AIRWAY INHALATION TREATMENT: CPT

## 2023-01-04 PROCEDURE — 6370000000 HC RX 637 (ALT 250 FOR IP): Performed by: NURSE PRACTITIONER

## 2023-01-04 PROCEDURE — 93010 ELECTROCARDIOGRAM REPORT: CPT | Performed by: INTERNAL MEDICINE

## 2023-01-04 PROCEDURE — 94761 N-INVAS EAR/PLS OXIMETRY MLT: CPT

## 2023-01-04 PROCEDURE — 80048 BASIC METABOLIC PNL TOTAL CA: CPT

## 2023-01-04 PROCEDURE — 6370000000 HC RX 637 (ALT 250 FOR IP): Performed by: STUDENT IN AN ORGANIZED HEALTH CARE EDUCATION/TRAINING PROGRAM

## 2023-01-04 PROCEDURE — 36415 COLL VENOUS BLD VENIPUNCTURE: CPT

## 2023-01-04 PROCEDURE — 2500000003 HC RX 250 WO HCPCS: Performed by: INTERNAL MEDICINE

## 2023-01-04 PROCEDURE — 87070 CULTURE OTHR SPECIMN AEROBIC: CPT

## 2023-01-04 RX ORDER — FENTANYL CITRATE 50 UG/ML
50 INJECTION, SOLUTION INTRAMUSCULAR; INTRAVENOUS
Status: DISCONTINUED | OUTPATIENT
Start: 2023-01-04 | End: 2023-01-11 | Stop reason: HOSPADM

## 2023-01-04 RX ORDER — PROPOFOL 10 MG/ML
5-50 INJECTION, EMULSION INTRAVENOUS CONTINUOUS
Status: DISCONTINUED | OUTPATIENT
Start: 2023-01-04 | End: 2023-01-07

## 2023-01-04 RX ORDER — MIDAZOLAM HYDROCHLORIDE 2 MG/2ML
2 INJECTION, SOLUTION INTRAMUSCULAR; INTRAVENOUS ONCE
Status: COMPLETED | OUTPATIENT
Start: 2023-01-04 | End: 2023-01-04

## 2023-01-04 RX ORDER — 0.9 % SODIUM CHLORIDE 0.9 %
500 INTRAVENOUS SOLUTION INTRAVENOUS ONCE
Status: COMPLETED | OUTPATIENT
Start: 2023-01-04 | End: 2023-01-05

## 2023-01-04 RX ORDER — ETOMIDATE 2 MG/ML
20 INJECTION INTRAVENOUS ONCE
Status: COMPLETED | OUTPATIENT
Start: 2023-01-04 | End: 2023-01-04

## 2023-01-04 RX ORDER — ALBUTEROL SULFATE 2.5 MG/3ML
2.5 SOLUTION RESPIRATORY (INHALATION) EVERY 4 HOURS PRN
Status: DISCONTINUED | OUTPATIENT
Start: 2023-01-04 | End: 2023-01-07

## 2023-01-04 RX ORDER — MIDAZOLAM HYDROCHLORIDE 1 MG/ML
INJECTION INTRAMUSCULAR; INTRAVENOUS
Status: COMPLETED
Start: 2023-01-04 | End: 2023-01-04

## 2023-01-04 RX ORDER — SUCCINYLCHOLINE CHLORIDE 20 MG/ML
80 INJECTION INTRAMUSCULAR; INTRAVENOUS ONCE
Status: COMPLETED | OUTPATIENT
Start: 2023-01-04 | End: 2023-01-04

## 2023-01-04 RX ADMIN — MIRTAZAPINE 15 MG: 15 TABLET, FILM COATED ORAL at 21:19

## 2023-01-04 RX ADMIN — FENTANYL CITRATE 50 MCG: 50 INJECTION INTRAMUSCULAR; INTRAVENOUS at 22:13

## 2023-01-04 RX ADMIN — FENTANYL CITRATE 50 MCG: 50 INJECTION INTRAMUSCULAR; INTRAVENOUS at 16:12

## 2023-01-04 RX ADMIN — FENTANYL CITRATE 50 MCG: 50 INJECTION INTRAMUSCULAR; INTRAVENOUS at 19:55

## 2023-01-04 RX ADMIN — ENOXAPARIN SODIUM 40 MG: 100 INJECTION SUBCUTANEOUS at 08:33

## 2023-01-04 RX ADMIN — FENTANYL CITRATE 50 MCG: 50 INJECTION INTRAMUSCULAR; INTRAVENOUS at 11:41

## 2023-01-04 RX ADMIN — SODIUM CHLORIDE, PRESERVATIVE FREE 10 ML: 5 INJECTION INTRAVENOUS at 21:20

## 2023-01-04 RX ADMIN — SODIUM CHLORIDE 500 ML: 9 INJECTION, SOLUTION INTRAVENOUS at 23:32

## 2023-01-04 RX ADMIN — PROPOFOL 15 MCG/KG/MIN: 10 INJECTION, EMULSION INTRAVENOUS at 11:24

## 2023-01-04 RX ADMIN — FUROSEMIDE 20 MG: 10 INJECTION, SOLUTION INTRAMUSCULAR; INTRAVENOUS at 08:30

## 2023-01-04 RX ADMIN — Medication 4 MG/HR: at 22:37

## 2023-01-04 RX ADMIN — SUCRALFATE 1 G: 1 TABLET ORAL at 21:19

## 2023-01-04 RX ADMIN — PROPOFOL 35 MCG/KG/MIN: 10 INJECTION, EMULSION INTRAVENOUS at 15:57

## 2023-01-04 RX ADMIN — SUCRALFATE 1 G: 1 TABLET ORAL at 13:08

## 2023-01-04 RX ADMIN — MIDAZOLAM 2 MG: 1 INJECTION INTRAMUSCULAR; INTRAVENOUS at 11:27

## 2023-01-04 RX ADMIN — ETOMIDATE 20 MG: 2 INJECTION, SOLUTION INTRAVENOUS at 11:19

## 2023-01-04 RX ADMIN — CARBAMAZEPINE 100 MG: 100 SUSPENSION ORAL at 21:20

## 2023-01-04 RX ADMIN — PROPOFOL 35 MCG/KG/MIN: 10 INJECTION, EMULSION INTRAVENOUS at 23:33

## 2023-01-04 RX ADMIN — TOPIRAMATE 25 MG: 25 TABLET, FILM COATED ORAL at 21:20

## 2023-01-04 RX ADMIN — IPRATROPIUM BROMIDE AND ALBUTEROL SULFATE 1 AMPULE: .5; 3 SOLUTION RESPIRATORY (INHALATION) at 07:50

## 2023-01-04 RX ADMIN — MIDAZOLAM HYDROCHLORIDE 2 MG: 2 INJECTION, SOLUTION INTRAMUSCULAR; INTRAVENOUS at 11:27

## 2023-01-04 RX ADMIN — PREGABALIN 200 MG: 100 CAPSULE ORAL at 21:19

## 2023-01-04 RX ADMIN — PIPERACILLIN AND TAZOBACTAM 3375 MG: 3; .375 INJECTION, POWDER, FOR SOLUTION INTRAVENOUS at 01:00

## 2023-01-04 RX ADMIN — METHYLPREDNISOLONE SODIUM SUCCINATE 20 MG: 40 INJECTION, POWDER, FOR SOLUTION INTRAMUSCULAR; INTRAVENOUS at 08:30

## 2023-01-04 RX ADMIN — PIPERACILLIN AND TAZOBACTAM 3375 MG: 3; .375 INJECTION, POWDER, FOR SOLUTION INTRAVENOUS at 16:07

## 2023-01-04 RX ADMIN — ROPINIROLE HYDROCHLORIDE 1 MG: 1 TABLET, FILM COATED ORAL at 21:19

## 2023-01-04 RX ADMIN — PIPERACILLIN AND TAZOBACTAM 3375 MG: 3; .375 INJECTION, POWDER, FOR SOLUTION INTRAVENOUS at 08:51

## 2023-01-04 RX ADMIN — BUSPIRONE HYDROCHLORIDE 30 MG: 15 TABLET ORAL at 21:20

## 2023-01-04 RX ADMIN — Medication 50 MCG/HR: at 23:31

## 2023-01-04 RX ADMIN — SUCCINYLCHOLINE CHLORIDE 80 MG: 20 INJECTION, SOLUTION INTRAMUSCULAR; INTRAVENOUS at 11:20

## 2023-01-04 RX ADMIN — FUROSEMIDE 20 MG: 10 INJECTION, SOLUTION INTRAMUSCULAR; INTRAVENOUS at 16:07

## 2023-01-04 RX ADMIN — SUCRALFATE 1 G: 1 TABLET ORAL at 15:59

## 2023-01-04 RX ADMIN — DEXMEDETOMIDINE HYDROCHLORIDE 0.3 MCG/KG/HR: 4 INJECTION, SOLUTION INTRAVENOUS at 08:27

## 2023-01-04 RX ADMIN — METHYLPREDNISOLONE SODIUM SUCCINATE 20 MG: 40 INJECTION, POWDER, FOR SOLUTION INTRAMUSCULAR; INTRAVENOUS at 21:19

## 2023-01-04 ASSESSMENT — PULMONARY FUNCTION TESTS
PIF_VALUE: 13
PIF_VALUE: 20
PIF_VALUE: 19
PIF_VALUE: 23
PIF_VALUE: 29
PIF_VALUE: 21
PIF_VALUE: 21
PIF_VALUE: 17
PIF_VALUE: 21
PIF_VALUE: 22
PIF_VALUE: 16

## 2023-01-04 NOTE — PROGRESS NOTES
Physician Progress Note      Zelda Rodriguez  CSN #:                  [id number]  :                       1970  ADMIT DATE:       2023 12:55 PM  DISCH DATE:  RESPONDING  PROVIDER #:        Starr Davis          QUERY TEXT:    Patient admitted with acute respiratory failure. Documentation reflects sepsis   and UTI in H&P, sepsis and pneumonia per cardiology. If possible, please   document in the progress notes and discharge summary if sepsis was: The medical record reflects the following:  Risk Factors: UTI, pneumonia  Clinical Indicators: per H&P \"Possible sepsis with lactic acidosis, UTI or   cholecystitis are possible causes\", cholecystitis ruled out per GB US, UA   shows small leukocytes, urine cx no growth, WBC 17.5, lactic acid 3, HR   initially 90's, RR max 47, acute respiratory failure and metabolic   encephalopathy documented, per cardiology \"Pneumonia with acute CHF\", CXR   showed diffuse infiltrates  Treatment: IV Vanco and Zosyn, Solumedrol, O2 per NRB/HHF NC, CXR, CT's, labs,   cultures, monitoring    Thank you, Mireya Causey, CDI  email - [email or web address]  cell- [phone]  office hours M-[id number]  Options provided:  -- sepsis confirmed after study  -- pneumonia and UTI without Sepsis  -- pneumonia without Sepsis  -- UTI without Sepsis  -- Other - I will add my own diagnosis  -- Disagree - Not applicable / Not valid  -- Disagree - Clinically unable to determine / Unknown  -- Refer to Clinical Documentation Reviewer    PROVIDER RESPONSE TEXT:    Provider is clinically unable to determine a response to this query.     Query created by: Ellen Galeanor on 2023 12:48 PM      Electronically signed by:  Starr Davis 2023 2:38 PM

## 2023-01-04 NOTE — PROGRESS NOTES
[place] [facility]   Consult Note                 Date:   1/4/2023  Patient name: Estella Aguilar  Date of admission:  1/1/2023 12:55 PM  MRN:   [id number]  YOB: 1970    Reason for Consult: Elevated troponin    CHIEF COMPLAINT: AMS    History Obtained From:  Patient       SUBJECTIVE:  HR 59  /78  EKG showed Qtc 507  Awaiting BMP  Was hypoxic last night, switched from HFNC to BIPAP this am. CXR shows opacification of right lower lobe, likely mucus plugging causing RLL collapse  2.07 L UOP    HISTORY OF PRESENT ILLNESS:    The patient is a 47 y.o. female presenting on January 1 after being found altered mental status at home after home oxygen. In the ED, CT chest showed signs of groundglass opacities and pulmonary venous congestion and was placed on nonrebreather. Patient suspected to have sepsis on admission, and was found to have elevated troponins. Patient has a past medical history for COPD on home oxygen, MDD, ethanol abuse, chronic pancreatitis, seizure disorder, hypertension, active smoker, polysubstance abuse. Past Medical History:   has a past medical history of Acute respiratory failure with hypoxia ([address] Alcohol withdrawal syndrome, with delirium ([address] Alcoholism ([address] Anal warts, Anemia, Anxiety, 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24, the patient underwent 2 surgical resections without known recurrence, Bandemia, Closed fracture of lateral portion of left tibial plateau, Condyloma, COPD (chronic obstructive pulmonary disease) ([address] Depression, Dysphagia, GI bleed, Hypertension, Memory loss, Oxygen dependent, Pain, joint, ankle and foot, Pancreatic lesion, Perianal wart, Peripheral neuropathy, Seizures ([address] Tension headache, Under care of team, Under care of team, Under care of team, Under care of team, Under care of team, Under care of team, Wears glasses, Wears partial dentures, and Wellness examination.     Past Surgical History:   has a past surgical history that includes Hysterectomy (2003); Brain tumor excision (1989); fracture surgery (Left, 07/03/2013); fracture surgery (Right); Upper gastrointestinal endoscopy (N/A, 10/22/2020); Colonoscopy (N/A, 10/22/2020); Endoscopic ultrasonography, GI (N/A, 12/09/2020); Upper gastrointestinal endoscopy (N/A, 04/05/2021); Hand surgery; CT ABSCESS DRAINAGE (07/28/2021); ERCP (N/A, 08/11/2021); ERCP (08/11/2021); Upper gastrointestinal endoscopy (N/A, 09/08/2021); Spine surgery (N/A, 09/10/2021); ERCP (N/A, 10/21/2021); ERCP (10/21/2021); ERCP (10/21/2021); Colonoscopy (N/A, 11/19/2021); Anus surgery (N/A, 01/25/2022); endoscopic ultrasound (upper) (02/09/2022); Upper gastrointestinal endoscopy (N/A, 2/9/2022); and Upper gastrointestinal endoscopy (2/9/2022). Home Medications:    Prior to Admission medications    Medication Sig Start Date End Date Taking? Authorizing Provider   lipase-protease-amylase (CREON) 79264-13020 units delayed release capsule TAKE ONE CAPSULE BY MOUTH THREE TIMES A DAY WITH A MEAL 12/19/22   Ludivina Sadler MD   baclofen (LIORESAL) 10 MG tablet Take 1 tablet by mouth 3 times daily 12/14/22   Ludivina Sadler MD   metoclopramide (REGLAN) 5 MG tablet TAKE ONE TABLET BY MOUTH THREE TIMES A DAY 11/15/22   Dayo Quiles MD   fluticasone-umeclidin-vilant (TRELEGY ELLIPTA) 763-25.4-86 MCG/INH AEPB Inhale 1 puff into the lungs daily 11/1/22 [place] November, APRN - CNP   pregabalin (LYRICA) 200 MG capsule Take 1 capsule by mouth nightly for 30 days.  11/1/22 12/1/22 [place] November, APRN - CNP   albuterol sulfate HFA (VENTOLIN HFA) 108 (90 Base) MCG/ACT inhaler Inhale 2 puffs into the lungs 4 times daily as needed for Wheezing 11/1/22   [place] November, APRN - CNP   amLODIPine (NORVASC) 10 MG tablet TAKE ONE TABLET BY MOUTH DAILY 10/17/22   Ludivina Sadler MD   verapamil (CALAN SR) 120 MG extended release tablet Take 1 tablet by mouth daily 10/13/22   Greg Figueroa APRN - CNP   varenicline (CHANTIX) 1 MG tablet TAKE ONE TABLET BY MOUTH TWICE A DAY (START AFTER FINISHING TAPER) 10/3/22   Ludivina Sánchez MD   hydrOXYzine HCl (ATARAX) 50 MG tablet  9/13/22   Historical Provider, MD   omeprazole (PRILOSEC) 40 MG delayed release capsule Take 1 capsule by mouth in the morning and at bedtime 9/19/22   Georgette Celis MD   sucralfate (CARAFATE) 1 GM tablet Take 1 tablet by mouth 4 times daily 9/19/22   Georgette Celis MD   atorvastatin (LIPITOR) 20 MG tablet Take 1 tablet by mouth daily 9/6/22   Adine Every, LOI - CNP   mirtazapine (REMERON) 15 MG tablet  7/21/22   Elizabethneisha Dey   REXULTI 1 MG TABS tablet  8/9/22   Elizabeth Dey   topiramate (TOPAMAX) 50 MG tablet Take 1 tablet by mouth 2 times daily 8/29/22   Adine Every, LOI - CNP   busPIRone (BUSPAR) 30 MG tablet Take 1 tablet by mouth in the morning and at bedtime 7/21/22   Elizabeth Dey   solifenacin (VESICARE) 10 MG tablet Take 1 tablet by mouth in the morning. 7/20/22   Xiomara MASTERS MD   rOPINIRole (REQUIP) 1 MG tablet TAKE ONE TABLET BY MOUTH ONCE NIGHTLY 7/11/22   LOI Marcum NP   carBAMazepine (TEGRETOL) 200 MG tablet TAKE ONE TABLET BY MOUTH THREE TIMES A DAY 7/11/22   LOI Marcum NP   Handicap Placard MISC by Does not apply route Expires 5/2027 5/9/22   Ludivina Sánchez MD   prazosin (MINIPRESS) 1 MG capsule Take 1 capsule by mouth nightly 5/4/22   LOI Marcum NP   nicotine polacrilex (NICOTINE MINI) 4 MG lozenge Take 1 lozenge by mouth as needed for Smoking cessation Weeks 1 to 6: 1 lozenge every 1 to 2 hours. Weeks 7 to 9: 1 lozenge every 2 to 4 hours. Weeks 10 to 12: 1 lozenge every 4 to 8 hours. Maximum 20 lozenges per day.  4/7/22   LOI Marcum NP   sodium chloride 1 g tablet Take 1 tablet by mouth 4 times daily Note increase in dose per Dr Idalmis Erickson 4/4/22   MD quan Burton (SENOKOT) 8.6 MG tablet Take 1 tablet by mouth 2 times daily 1/25/22 1/25/23  Hallie Sahu DO   lidocaine (XYLOCAINE) 5 % ointment Apply topically as needed. 1/25/22   Ramona Dover DO   simethicone (MYLICON) 80 MG chewable tablet Take 1 tablet by mouth 4 times daily as needed for Flatulence 8/27/21   Manjeet Camacho MD   vitamin D (ERGOCALCIFEROL) 68744 units CAPS capsule Take 1 capsule by mouth once a week 6/19/19   Ludivina Grimm MD   folic acid (FOLVITE) 1 MG tablet Take 1 tablet by mouth daily 6/19/19   Ludivina Grimm MD       Allergies:  Patient has no known allergies.    Social History:   reports that she has been smoking cigarettes. She has a 30.00 pack-year smoking history. She has never used smokeless tobacco. She reports that she does not currently use alcohol. She reports current drug use. Frequency: 2.00 times per week.     Family History: family history includes Arthritis in her mother; Cancer in her maternal grandmother; Esophageal Cancer in her maternal aunt; Heart Disease in her paternal grandmother; Other in her father. No for premature CAD. No for h/o sudden cardiac death    REVIEW OF SYSTEMS:    Unable to assess ROS, altered.      PHYSICAL EXAM:    Physical Examination:    /78   Pulse 59   Temp 97.9 °F (36.6 °C) (Axillary)   Resp 22   Ht 5' 6\" (1.676 m)   Wt 141 lb 8.6 oz (64.2 kg)   SpO2 94%   BMI 22.84 kg/m²    Constitutional and General Appearance:  altered   HEENT: PERRL, no cervical lymphadenopathy  Respiratory:  Good respiratory effort. No for increased work of breathing.   On auscultation: diminished to auscultation bilaterally, no basilar rales, no wheezing   Cardiovascular:  regular S1 and S2.  No murmur audible   No Jugular venous distention, no hepatojugular reflex  Peripheral pulses are symmetrical and full   Abdomen:  No masses or tenderness  Bowel sounds present  Extremities:   No Cyanosis or Clubbing   Lower extremity edema: No   Skin: Warm and dry  Neurological:  Not done       DATA:    Diagnostics:      EKG  1/2/23 sinus rhythm, first-degree AV block, Normal axis,  T wave inversion in anterior leads, prolonged QTc    ECHO  7/22/21  Left ventricle is normal in size. Global left ventricular systolic function  is difficult to assess due to heart rate but appears normal. Calculated  ejection fraction 70% by Guadarrama's method. Visually estimated EF 60-65%. Left atrium is normal in size. Lipomatous atrial septum. No shunt seen by color Doppler. Normal right ventricular size and function. No significant valvular regurgitation or stenosis seen. Coronary angiography: Not done  Labs:     CBC:   Recent Labs     01/03/23  0428 01/04/23  0705   WBC 7.7 6.3   HGB 11.2* 11.8*   HCT 35.8* 36.1*    371       BMP:   Recent Labs     01/02/23  0403 01/03/23  0428   * 144   K 4.5 4.2   CO2 19* 23   BUN 38* 27*   CREATININE 0.54 0.42*   LABGLOM >60 >60   GLUCOSE 89 110*       BNP: No results for input(s): BNP in the last 72 hours. PT/INR:   Recent Labs     01/01/23  1312   PROTIME 11.1   INR 1.0       APTT:  Recent Labs     01/01/23  1312   APTT 25.8       CARDIAC ENZYMES:No results for input(s): CKTOTAL, CKMB, CKMBINDEX, TROPONINI in the last 72 hours.   FASTING LIPID PANEL:  Lab Results   Component Value Date/Time    HDL 62 09/01/2022 11:49 AM    TRIG 130 09/01/2022 11:49 AM     LIVER PROFILE:  Recent Labs     01/01/23  1312 01/02/23  0403   AST 67* 64*   ALT 19 23   LABALBU 3.1* 2.3*           IMPRESSION:    Elevated troponin likely demand ischemia in the setting of sepsis  Acute CHF exacerbation  Acute hypoxic respiratory failure 2/2 RLL collapse, possible mucus plug  Hypertension  Anemia  COPD  MDD  Smoking history  Polysubstance abuse   chronic pancreatitis      RECOMMENDATIONS:  Follow up on echocardiogram  Avoid QTC prolonging medication  Continue on home blood pressure medication with holding parameters  Rest of care as per efrain Nava  PGY-3  Internal Medicine  [address]  1/4/2023 8:09 AM    Attending Physician Statement  I have discussed the care of Claudetta Speed, including pertinent history and exam findings,  with the cardiology fellow/resident. I have seen and examined the patient and the key elements of all parts of the encounter have been performed by me. I have completed at least one if not all key elements of the E/M (history, physical exam, and MDM). I agree with the assessment, plan and orders as documented by the resident with additional recommendations as below:     Patient was intubated in am due to worsening hypoxic resp failure, RLL consolidation noted, plans for bronch today, echo awaited, continue gentle diuresis.     Elias Burnham MD, [facility], [facility]

## 2023-01-04 NOTE — PROGRESS NOTES
Called to patient room due to decreasing Oxygen saturation. Pt was placed on AVAPs of 480, RR-9. 12 of EPAP and 100% FIO2. Pt removed from Heated High Flow.

## 2023-01-04 NOTE — PROGRESS NOTES
Occupational [facility] [employer]  Occupational Therapy Not Seen Note    DATE: 2023    NAME: Trey Wyatt  MRN: [id number]   : 1970      Patient not seen this date for Occupational Therapy due to:    Patient is not appropriate for active participation in OT evaluation/treatment at this time d/t just intubated this AM d/t respiratory status.      Next Scheduled Treatment: Check 2022     Electronically signed by Lefty Almanza OT on 2023 at 5:45 PM

## 2023-01-04 NOTE — PROGRESS NOTES
Pt drop in 02 sats while on 100% fio2 bipap. Unable to increase oxygen sats. Dr. Shahrzad Tubbs at bedside preparing for intubation. 11:19 -2 versed given  11:19 -20 etomidate given  11:20 -80 succs given  11:22- 22 at the lip. + color change and bilateral breath sounds heard.     VSS

## 2023-01-04 NOTE — PROGRESS NOTES
Physical Therapy        Physical Therapy Cancel Note      DATE: 2023    NAME: Abimael Marcial  MRN: [id number]   : 1970      Patient not seen this date for Physical Therapy due to: Other: pt recently intubated due to poor respiratory status. PT will check back 23 for evaluation appropriateness.         Electronically signed by Albert Hayden PT on 2023 at 2:40 PM

## 2023-01-04 NOTE — PROGRESS NOTES
Comprehensive Nutrition Assessment    Type and Reason for Visit:  Reassess, Consult (TF ordering/management)    Nutrition Recommendations/Plan:   Start Tube Feeding- suggest Peptide Based Formula goal 50 mL/hr with propofol running at current rate. Will monitor TF tolerance/adequacy and pt progress/care plans. Malnutrition Assessment:  Malnutrition Status:  Insufficient data (01/02/23 1150)    Context:  Chronic Illness     Findings of the 6 clinical characteristics of malnutrition:  Energy Intake:  Unable to assess  Weight Loss:  No significant weight loss     Body Fat Loss:  No significant body fat loss     Muscle Mass Loss:  Unable to assess    Fluid Accumulation:  No significant fluid accumulation (per RN documentation)     Strength:  Not Performed    Nutrition Assessment:    Pt is now intubated. Plan to start TF today; RD consulted for TF ordering/management. Meds/labs reviewed. Nutrition Related Findings:    meds/labs reviewed Wound Type: None       Current Nutrition Intake & Therapies:    ADULT DIET; Regular; No Added Salt (3-4 gm)--Now NPO    Additional Calorie Sources:  Propofol at 8.3 mL/ss=951 kcal/day    Anthropometric Measures:  Height: 5' 6\" (167.6 cm)  Ideal Body Weight (IBW): 130 lbs (59 kg)    Admission Body Weight: 141 lb 8.6 oz (64.2 kg)  Current Body Weight: 141 lb 8.6 oz (64.2 kg),   IBW. Weight Source: Bed Scale  Current BMI (kg/m2): 22.9  Usual Body Weight: 152 lb (68.9 kg) (8/9/22 per EHR)  % Weight Change (Calculated): -6.9                    BMI Categories: Normal Weight (BMI 18.5-24. 9)    Estimated Daily Nutrient Needs:  Energy Requirements Based On: Kcal/kg  Weight Used for Energy Requirements: Current  Energy (kcal/day): 1600 kcal/day  Weight Used for Protein Requirements: Current  Protein (g/day): 80 g pro/day  Method Used for Fluid Requirements: ml/Kg (25-28 mL/kg)  Fluid (ml/day): 7001-9081 mL/day    Nutrition Diagnosis:   Inadequate oral intake related to impaired respiratory function as evidenced by NPO or clear liquid status due to medical condition ,need for enteral nutrition support    Nutrition Interventions:   Food and/or Nutrient Delivery: Start Tube Feeding  Nutrition Education/Counseling: No recommendation at this time  Coordination of Nutrition Care: Continue to monitor while inpatient    Goals:  Previous Goal Met: Progressing toward Goal(s)  Goals: Meet at least 75% of estimated needs, within 7 days       Nutrition Monitoring and Evaluation:   Behavioral-Environmental Outcomes: None Identified  Food/Nutrient Intake Outcomes: Enteral Nutrition Intake/Tolerance  Physical Signs/Symptoms Outcomes: Biochemical Data, Nutrition Focused Physical Findings, Skin, Weight, Fluid Status or Edema    Discharge Planning:     Too soon to determine     [address], [place]  Contact: [phone]

## 2023-01-04 NOTE — PLAN OF CARE
Problem: Discharge Planning  Goal: Discharge to home or other facility with appropriate resources  1/3/2023 2237 by Justice Robison RN  Outcome: Progressing  1/3/2023 1630 by Hira Pereyra RN  Outcome: Progressing     Problem: Pain  Goal: Verbalizes/displays adequate comfort level or baseline comfort level  1/3/2023 2237 by Justice Robison RN  Outcome: Progressing  1/3/2023 1630 by Hira Pereyra RN  Outcome: Progressing     Problem: Skin/Tissue Integrity  Goal: Absence of new skin breakdown  Description: 1. Monitor for areas of redness and/or skin breakdown  2. Assess vascular access sites hourly  3. Every 4-6 hours minimum:  Change oxygen saturation probe site  4. Every 4-6 hours:  If on nasal continuous positive airway pressure, respiratory therapy assess nares and determine need for appliance change or resting period.   1/3/2023 1630 by Hira Pereyra RN  Outcome: Progressing     Problem: Nutrition Deficit:  Goal: Optimize nutritional status  1/3/2023 1630 by Hira Pereyra RN  Outcome: Progressing     Problem: Respiratory - Adult  Goal: Achieves optimal ventilation and oxygenation  1/3/2023 2237 by Justice Robison RN  Outcome: Progressing  1/3/2023 1630 by Hira Pereyra RN  Outcome: Progressing

## 2023-01-04 NOTE — PLAN OF CARE
Problem: Respiratory - Adult  Goal: Achieves optimal ventilation and oxygenation  1/4/2023 0956 by Karina Goncalves RN  Outcome: Not Progressing  Flowsheets (Taken 1/4/2023 0800)  Achieves optimal ventilation and oxygenation:   Assess for changes in mentation and behavior   Position to facilitate oxygenation and minimize respiratory effort   Oxygen supplementation based on oxygen saturation or arterial blood gases   Initiate smoking cessation protocol as indicated   Assess the need for suctioning and aspirate as needed     Problem: Discharge Planning  Goal: Discharge to home or other facility with appropriate resources  1/4/2023 0956 by Karina Goncalves RN  Outcome: Progressing  Flowsheets (Taken 1/4/2023 0800)  Discharge to home or other facility with appropriate resources:   Identify barriers to discharge with patient and caregiver   Arrange for needed discharge resources and transportation as appropriate   Identify discharge learning needs (meds, wound care, etc)     Problem: Pain  Goal: Verbalizes/displays adequate comfort level or baseline comfort level  1/4/2023 0956 by Karina Goncalves RN  Outcome: Progressing  Flowsheets (Taken 1/4/2023 0800)  Verbalizes/displays adequate comfort level or baseline comfort level:   Encourage patient to monitor pain and request assistance   Assess pain using appropriate pain scale     Problem: Skin/Tissue Integrity  Goal: Absence of new skin breakdown  Description: 1. Monitor for areas of redness and/or skin breakdown  2. Assess vascular access sites hourly  3. Every 4-6 hours minimum:  Change oxygen saturation probe site  4. Every 4-6 hours:  If on nasal continuous positive airway pressure, respiratory therapy assess nares and determine need for appliance change or resting period.   Outcome: Progressing     Problem: Nutrition Deficit:  Goal: Optimize nutritional status  Outcome: Progressing     Problem: Confusion  Goal: Confusion, delirium, dementia, or psychosis is improved or at baseline  Description: INTERVENTIONS:  1. Assess for possible contributors to thought disturbance, including medications, impaired vision or hearing, underlying metabolic abnormalities, dehydration, psychiatric diagnoses, and notify attending LIP  2. [place] high risk fall precautions, as indicated  3. Provide frequent short contacts to provide reality reorientation, refocusing and direction  4. Decrease environmental stimuli, including noise as appropriate  5. Monitor and intervene to maintain adequate nutrition, hydration, elimination, sleep and activity  6. If unable to ensure safety without constant attention obtain sitter and review sitter guidelines with assigned personnel  7.  Initiate Psychosocial CNS and Spiritual Care consult, as indicated  Outcome: Progressing     Problem: Respiratory - Adult  Goal: Achieves optimal ventilation and oxygenation  1/4/2023 0956 by Bhavnai Herman RN  Outcome: Not Progressing  Flowsheets (Taken 1/4/2023 0800)  Achieves optimal ventilation and oxygenation:   Assess for changes in mentation and behavior   Position to facilitate oxygenation and minimize respiratory effort   Oxygen supplementation based on oxygen saturation or arterial blood gases   Initiate smoking cessation protocol as indicated   Assess the need for suctioning and aspirate as needed  1/3/2023 2237 by Sasha Wolf RN  Outcome: Progressing

## 2023-01-04 NOTE — PROGRESS NOTES
INTENSIVE CARE UNIT  Resident Physician Progress Note    Patient - Augusta Simeon  Date of Admission -  1/1/2023 12:55 PM  Date of Evaluation -  1/4/2023  Room and Bed Number -  7880/3208-85   Hospital Day - 3    Chief Complaint   Patient presents with    Fatigue    Fall    Respiratory Distress      SUBJECTIVE:     OVERNIGHT EVENTS: Overnight the patient became agitated. She was pulling down the high flow nasal cannula. She was off the high flow nasal cannula for couple of times. She desatted to low 70s. She was put back on BiPAP, with 100% FiO2, 500 mL of tidal volume . Currently maintaining saturation of 94%. .    TODAY: Patient is alert and oriented x2. .  She is requiring BiPAP. Hemodynamically stable without any pressor support. Continuing on the Precedex drip but the drip was cut down to 0.3 mcg. Yesterday we did cut back on midthoracic pain, carbamazepine. Yesterday EKG was showing prolonged QT. Morning labs shows proBNP of 3543, which is trending down. on Zosyn. Significant event 1/4/2023:  During the round, the patient was desatted to 87 on BiPAP with 100% FiO2. Decision was made to intubate the patient and put her on ventilator. Patient was sedated with 20 of succinylcholine and 20 of etomidate. 4 of Versed was also given. Intubation was done and patient is currently on ventilator with the following vent setting. PRVC mode/100% FiO2/RR 16/+5 of PEEP    History of present illness:  76-year-old female with past medical history significant for COPD, generalized anxiety disorder, major depressive disorder, ethanol abuse, chronic pancreatitis, seizure disorder and hypertension. Patient presented to the emergency department via her mother after her mother visited her and stated that she was not on her home oxygen and appeared altered. On arrival patient's O2 saturation was 42% and she was requiring 25 L via nonrebreather.   Initial labs demonstrated lactic acidosis with a lactic acid of 3.0, leukocytosis 17.5 and urinalysis concerning for UTI. Initial VBG showed pH of 7.23/50.4/33.8/20.8. Patient was initially given IV fluid therapy in the setting of lactic acidosis. Chest x-ray demonstrated diffuse infiltrates. CT head was negative. CT PE demonstrated no pulmonary embolism although demonstrated diffuse groundglass background parenchymal pattern with mosaic type distribution suggestive of pulmonary venous congestion. No fractures on lumbar imaging. Patient did complain of right upper quadrant abdominal pain. Patient was very anxious in the emergency department received IM hydroxyzine. Patient was redirectable briefly however rapid response was called as patient would not keep her nonrebreather on and became hypoxic with an O2 saturation of 75 to 80% and was combative with staff. Upon my examination patient was pulling off nonrebreather and initial plan was for intubation. However patient was redirected and placed in restraints and calm down with nonrebreather on and oxygen saturation in the 90s. On,2023, patient desatted on 100% FiO2 on BiPAP. Was intubated and sedated. Currently on ventilator with above-mentioned vent setting.     AWAKE & FOLLOWING COMMANDS:  [x] No   [] Yes    SECRETIONS Amount:  [x] Small [] Moderate  [] Large  [] None  Color:     [] White [] Colored  [] Bloody    SEDATION:  RAAS Score:  [x] Propofol gtt  [] Versed gtt  [] Ativan gtt   [x] No Sedation    PARALYZED:  [x] No    [] Yes    VASOPRESSORS:  [x] No    [] Yes  [] Levophed [] Dopamine [] Vasopressin  [] Dobutamine [] Phenylephrine [] Epinephrine      OBJECTIVE:     VITAL SIGNS:  /69   Pulse 59   Temp (!) 96.6 °F (35.9 °C) (Axillary)   Resp 14   Ht 5' 6\" (1.676 m)   Wt 141 lb 8.6 oz (64.2 kg)   SpO2 100%   BMI 22.84 kg/m²   Tmax over 24 hours:  Temp (24hrs), Av.9 °F (36.6 °C), Min:96.6 °F (35.9 °C), Max:98.4 °F (36.9 °C)      Patient Vitals for the past 8 hrs:   BP Temp Temp src Pulse Resp SpO2   01/04/23 0900 107/69 -- -- 59 14 100 %   01/04/23 0852 -- (!) 96.6 °F (35.9 °C) Axillary -- -- --   01/04/23 0800 109/81 97.1 °F (36.2 °C) Axillary 60 19 95 %   01/04/23 0757 -- -- -- 59 22 94 %   01/04/23 0700 95/67 -- -- 58 20 (!) 89 %   01/04/23 0649 -- -- -- 58 19 (!) 86 %   01/04/23 0400 121/78 97.9 °F (36.6 °C) Axillary 58 15 91 %   01/04/23 0349 -- -- -- 59 15 (!) 89 %         Intake/Output Summary (Last 24 hours) at 1/4/2023 0930  Last data filed at 1/4/2023 0900  Gross per 24 hour   Intake --   Output 2325 ml   Net -2325 ml     Date 01/04/23 0000 - 01/04/23 2359   Shift 6634-0820 9336-5981 1012-4431 24 Hour Total   INTAKE   Shift Total(mL/kg)       OUTPUT   Urine(mL/kg/hr) 400(0.8) 250  650   Shift Total(mL/kg) 400(6.2) 250(3.9)  650(10.1)   Weight (kg) 64.2 64.2 64.2 64.2     Wt Readings from Last 3 Encounters:   01/01/23 141 lb 8.6 oz (64.2 kg)   11/01/22 152 lb (68.9 kg)   09/01/22 146 lb 9.6 oz (66.5 kg)     Body mass index is 22.84 kg/m². PHYSICAL EXAM:  GEN:  Patient is intubated, sedated on ventilator   EYES:  Lids and lashes normal, pupils equal, round and reactive to light, extra ocular muscles intact, sclera clear, conjunctiva normal  HEENT:  Normocephalic, without obvious abnormality, atramatic, sinuses nontender on palpation, external ears without lesions, oral pharynx with moist mucus membranes, tonsils without erythema or exudates, gums normal and good dentition.   LUNGS:  Diminished breath sounds on the right side, consistent with right lower lobe atelectasis  CV:    Normal apical impulse, regular rate and rhythm, normal S1 and S2, no S3 or S4, and no murmur noted  ABDOMEN:   No scars, normal bowel sounds, soft, non-distended, non-tender, no masses palpated, no hepatosplenomegaly      MEDICATIONS:  Scheduled Meds:   carBAMazepine  100 mg Oral BID    methylPREDNISolone  20 mg IntraVENous Q12H    topiramate  25 mg Oral BID    furosemide  20 mg IntraVENous BID ipratropium-albuterol  1 ampule Inhalation Q4H WA    piperacillin-tazobactam  3,375 mg IntraVENous Q8H    atorvastatin  20 mg Oral Daily    busPIRone  30 mg Oral BID    Pancrelipase (Lip-Prot-Amyl)  40,000 Units Oral TID WC    mirtazapine  15 mg Oral Nightly    pantoprazole  40 mg Oral QAM AC    pregabalin  200 mg Oral Nightly    rOPINIRole  1 mg Oral Nightly    sucralfate  1 g Oral 4x Daily    sodium chloride flush  5-40 mL IntraVENous 2 times per day    enoxaparin  40 mg SubCUTAneous Daily    lisinopril  5 mg Oral Daily    carvedilol  3.125 mg Oral BID WC    sodium chloride flush  5-40 mL IntraVENous 2 times per day    thiamine  100 mg Oral Daily    magnesium sulfate  2,000 mg IntraVENous Once     Continuous Infusions:   sodium chloride 10 mL/hr at 01/03/23 0612    dexmedetomidine 0.3 mcg/kg/hr (01/04/23 0827)    sodium chloride 10 mL/hr at 01/01/23 2312     PRN Meds:   albuterol sulfate HFA, 2 puff, 4x Daily PRN  hydrOXYzine HCl, 50 mg, TID PRN  sodium chloride flush, 10 mL, PRN  sodium chloride, , PRN  polyethylene glycol, 17 g, Daily PRN  acetaminophen, 650 mg, Q6H PRN   Or  acetaminophen, 650 mg, Q6H PRN  potassium chloride, 40 mEq, PRN   Or  potassium alternative oral replacement, 40 mEq, PRN   Or  potassium chloride, 10 mEq, PRN  magnesium sulfate, 2,000 mg, PRN  sodium chloride flush, 5-40 mL, PRN  sodium chloride, , PRN  LORazepam, 1 mg, Q1H PRN   Or  LORazepam, 1 mg, Q1H PRN   Or  LORazepam, 2 mg, Q1H PRN   Or  LORazepam, 2 mg, Q1H PRN   Or  LORazepam, 3 mg, Q1H PRN   Or  LORazepam, 3 mg, Q1H PRN   Or  LORazepam, 4 mg, Q1H PRN   Or  LORazepam, 4 mg, Q1H PRN        SUPPORT DEVICES: [] Ventilator [] BIPAP  [] Nasal Cannula [] Room Air    VENT SETTINGS (Comprehensive) (if applicable):   PRVC mode, FiO2 100% %, PEEP 5, Respiratory Rate 16, Tidal Volume 600     Additional Respiratory Assessments  Heart Rate: 59  Resp: 14  SpO2: 100 %  Humidification Source: Heated wire  Humidification Temp: 33    ABGs: Arterial Blood Gas result:  pH 7.37; pCO2 60;  HCO3 31.8;  %O2 Sat 97  Lab Results   Component Value Date/Time    OPZ2EBC NOT REPORTED 07/26/2021 03:38 PM    FIO2 100.0 01/01/2023 05:19 PM         DATA:  Complete Blood Count:   Recent Labs     01/02/23  0403 01/03/23  0428 01/04/23  0705   WBC 14.0* 7.7 6.3   RBC 3.67* 3.99 4.08   HGB 10.4* 11.2* 11.8*   HCT 33.5* 35.8* 36.1*   MCV 91.3 89.7 88.5   MCH 28.3 28.1 28.9   MCHC 31.0 31.3 32.7   RDW 16.1* 15.9* 15.5*    352 371   MPV 10.6 10.4 10.3        Last 3 Blood Glucose:   Recent Labs     01/01/23  1312 01/02/23  0403 01/03/23  0428 01/04/23  0705   GLUCOSE 121* 89 110* 114*        PT/INR:    Lab Results   Component Value Date/Time    PROTIME 11.1 01/01/2023 01:12 PM    INR 1.0 01/01/2023 01:12 PM     PTT:    Lab Results   Component Value Date/Time    APTT 25.8 01/01/2023 01:12 PM       Comprehensive Metabolic Profile:   Recent Labs     01/01/23  1312 01/01/23  1316 01/02/23  0403 01/03/23  0428 01/04/23  0705     --  134* 144 139   K 5.3  --  4.5 4.2 3.8     --  106 106 101   CO2 18*  --  19* 23 28   BUN 37*  --  38* 27* 23*   CREATININE 0.69   < > 0.54 0.42* 0.46*   GLUCOSE 121*  --  89 110* 114*   CALCIUM 6.8*  --  7.5* 7.0* 7.2*   PROT 6.6  --  5.5*  --   --    LABALBU 3.1*  --  2.3*  --   --    BILITOT 0.7  --  0.6  --   --    ALKPHOS 197*  --  168*  --   --    AST 67*  --  64*  --   --    ALT 19  --  23  --   --     < > = values in this interval not displayed.       Magnesium:   Lab Results   Component Value Date/Time    MG 1.8 01/04/2023 07:05 AM    MG 2.0 01/03/2023 04:28 AM    MG 2.9 01/02/2023 04:03 AM     Phosphorus:   Lab Results   Component Value Date/Time    PHOS 3.7 07/23/2021 06:29 AM    PHOS 2.5 10/27/2019 08:33 PM    PHOS 4.0 07/14/2019 05:29 AM     Ionized Calcium:   Lab Results   Component Value Date/Time    CAION 0.94 01/01/2023 08:21 PM    CAION 0.93 01/01/2023 03:05 PM    CAION 1.30 07/22/2021 03:22 AM        Urinalysis: Lab Results   Component Value Date/Time    NITRU NEGATIVE 01/01/2023 01:54 PM    COLORU DARK YELLOW 01/01/2023 01:54 PM    PHUR 5.5 01/01/2023 01:54 PM    WBCUA 2 TO 5 01/01/2023 01:54 PM    RBCUA 0 TO 2 01/01/2023 01:54 PM    MUCUS 2+ 09/01/2022 11:50 AM    TRICHOMONAS NOT REPORTED 10/12/2021 04:15 PM    YEAST MANY 01/01/2023 01:54 PM    BACTERIA MODERATE 01/01/2023 01:54 PM    CLARITYU cloudy 12/18/2018 03:46 PM    SPECGRAV 1.020 01/01/2023 01:54 PM    LEUKOCYTESUR SMALL 01/01/2023 01:54 PM    UROBILINOGEN ELEVATED 01/01/2023 01:54 PM    BILIRUBINUR NEGATIVE  Verified by ictotest. 01/01/2023 01:54 PM    BILIRUBINUR moderate 12/18/2018 03:46 PM    BLOODU neg 12/18/2018 03:46 PM    GLUCOSEU NEGATIVE 01/01/2023 01:54 PM    KETUA SMALL 01/01/2023 01:54 PM    AMORPHOUS 2+ 09/01/2022 11:50 AM       HgBA1c:    Lab Results   Component Value Date/Time    LABA1C 5.5 04/13/2021 11:34 AM     TSH:    Lab Results   Component Value Date/Time    TSH 1.40 01/02/2023 04:03 AM     Lactic Acid: No results found for: LACTA   Troponin: No results for input(s): TROPONINI in the last 72 hours.         ASSESSMENT:     Patient Active Problem List    Diagnosis Date Noted    Acute on chronic respiratory failure with hypoxia ([facility] [address] 01/01/2023    Intractable migraine without aura and without status migrainosus 05/24/2022    Sleep disturbance 05/04/2022    Moderate episode of recurrent major depressive disorder ([address] 12/14/2021    Adenomatous polyp of sigmoid colon     Hyponatremia 10/12/2021    Iron deficiency anemia 10/12/2021    Sepsis ([facility] [address] 08/09/2021    Acute recurrent pancreatitis 08/09/2021    Atelectasis of left lung 08/09/2021    Fluid collection of pancreas     Diverticulitis of large intestine without perforation or abscess with bleeding     Pseudocyst of pancreas     Alcohol-induced acute pancreatitis 07/31/2021    Community acquired pneumonia of left lower lobe of lung 07/29/2021    Septicemia ([facility] [address]     Metabolic acidosis with increased anion gap and accumulation of organic acids 07/28/2021    Compression fracture of thoracic vertebra (HCC)     Pathological compression fracture of lumbar vertebra with delayed healing     Closed fracture of fifth thoracic vertebra ([address] 07/22/2021    Diverticulitis of large intestine with abscess without bleeding 07/22/2021    Elevated alkaline phosphatase level 07/22/2021    Chronic pancreatitis ([address] 07/22/2021    Erythema ab igne 07/22/2021    Severe sepsis ([address] 07/21/2021    Multilevel spine pain 06/30/2021    Chronic pain syndrome 06/30/2021    Marijuana abuse 06/30/2021    Chronic peripheral neuropathic pain 05/04/2021    History of alcohol abuse 05/04/2021    History of petit-mal seizures 05/04/2021    Intractable episodic tension-type headache 05/04/2021    Personal history of astrocytoma 05/04/2021    Esophageal dysphagia     Major depressive disorder, recurrent severe without psychotic features ([address] 03/31/2021    AMAN (generalized anxiety disorder) 03/22/2021    Perineal mass, female 10/23/2020    Esophagitis candidal  10/23/2020    Condyloma 10/23/2020    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24, the patient underwent 2 surgical resections without known recurrence 10/23/2020    Alcohol use disorder, severe, in early remission ([address] 12/85/6354    Metabolic encephalopathy     Recurrent major depressive disorder ([address] 10/20/2020    Elevated CA 19-9 level 10/20/2020    Pancreatic cyst 10/19/2020    Acute respiratory failure with hypoxia ([address] 10/16/2020    Paresthesia of lower extremity 10/13/2020    COPD with acute exacerbation ([address] 10/12/2020    Seizure disorder ([address]     Ambulatory dysfunction 12/17/2019    Tobacco use     Hypomagnesemia 70/89/7489    Alcoholic peripheral neuropathy ([address] 12/14/2019    Hypokalemia 12/14/2019    Macrocytic anemia 12/14/2019    Psychophysiological insomnia 06/05/2019    Anxiety 06/05/2019    Essential hypertension 08/13/2015    Nicotine addiction 08/13/2015 Reflex sympathetic dystrophy of lower limb 03/27/2014    Acute bronchitis 10/02/2012          PLAN:     WEAN PER PROTOCOL:  [] No   [] Yes  [] N/A    ICU PROPHYLAXIS:  Stress ulcer:  [x] PPI Agent  [] M9Ocyed [] Sucralfate  [x] Other:  VTE:   [] Enoxaparin  [] Unfract.  Heparin Subcut  [x] EPC Cuffs    NUTRITION:  [x] NPO [] Tube Feeding (Specify: ) [] TPN  [] PO    HOME MEDS RECONCILED: [] No  [x] Yes    CONSULTATION NEEDED:  [] No  [x] Yes    FAMILY UPDATED:    [] No  [x] Yes    TRANSFER OUT OF ICU:   [x] No  [] Yes      Plan:  Neuro  Metabolic encephalopathy  Hx alcohol abuse, hx chronic pancreatitis  Precedex gtt  Hx anxiety, on atarax at home  Hx COPD, on 4 L NC at home, has not been compliant  Seizure precautions  CIWA protocol  Buspar 30 BID  Carbamazepine 200 TID, Topamax 50 BID  Remeron 15  Pregabalin 200  Requip 1 mg  Patient is intubated, sedated, currently on ventilator  On Precedex and propofol drip  Neurochecks per protocol       Cardiology  Cardiology consulted  EKG 1/1 with T wave inversions laterally  Avoid Qtc prolonging medications  Hx seizure disorder on carbamazepine  Lipitor  Coreg 3.125 BID  Lisinopril 5 daily  Following up on echo     Pulmonary  Intubated, sedated currently on ventilator with above-mentioned vent setting  DuoNeb  Symbicort  Monitor respiratory status  ABG PRN     Renal  Monitor intake output  Cr 0.54     GI  Tube feeding, orogastric peptide-based  Hx alcohol abuse with chronic pancreatitis  Ammonia 69  Lactulose 20 g TID  Lipase-protease-amylase 30,000 TID  Pancrelipase 40,000 TID  CT abdomen showing possible pericholecystic fluid, ultrasonogram right upper quadrant and gallbladder showed a cystlike etiology alcoholic  Protonix 40   Carafate 1 g QID  Thiamine  Alk phos elevated AST to ALT ratio >2 indicating alcoholic cirrhosis     ID  Zosyn  Given vanco and zosyn in ED  WBC 6.3     Endo  Glucose [address] MD  Internal Medicine Resident,PGY [address] [facility] [address] [facility]   Attending Physician Statement  I have discussed the care of Fady Sandoval, including pertinent history and exam findings,  with the resident. I have seen and examined the patient and the key elements of all parts of the encounter have been performed by me. I agree with the assessment, plan and orders as documented by the resident with additions . Hypoxic on BiPAP - 100 % fio2   Discussed with pt 's mother agreeable to proceed with intubation . Intubated   Cont steroids   bronchodilator   Follow  echo     Total critical care time caring for this patient with life threatening, unstable organ failure, including direct patient contact, management of life support systems, review of data including imaging and labs, discussions with other team members and physicians at least 36 Min so far today, excluding procedures. Electronically signed by Ravinder Betancourt MD on   1/4/23 at 9:07 PM EST    Please note that this chart was generated using voice recognition Dragon dictation software. Although every effort was made to ensure the accuracy of this automated transcription, some errors in transcription may have occurred.

## 2023-01-05 ENCOUNTER — APPOINTMENT (OUTPATIENT)
Dept: GENERAL RADIOLOGY | Age: 53
DRG: 133 | End: 2023-01-05
Payer: MEDICARE

## 2023-01-05 LAB
ALBUMIN SERPL-MCNC: 3.1 G/DL (ref 3.5–5.2)
ALBUMIN/GLOBULIN RATIO: 0.9 (ref 1–2.5)
ALLEN TEST: POSITIVE
ALP BLD-CCNC: 157 U/L (ref 35–104)
ALT SERPL-CCNC: 30 U/L (ref 5–33)
ANION GAP SERPL CALCULATED.3IONS-SCNC: 11 MMOL/L (ref 9–17)
AST SERPL-CCNC: 33 U/L
BILIRUB SERPL-MCNC: 0.4 MG/DL (ref 0.3–1.2)
BILIRUBIN DIRECT: 0.2 MG/DL
BILIRUBIN, INDIRECT: 0.2 MG/DL (ref 0–1)
BUN BLDV-MCNC: 23 MG/DL (ref 6–20)
CALCIUM SERPL-MCNC: 7.4 MG/DL (ref 8.6–10.4)
CHLORIDE BLD-SCNC: 101 MMOL/L (ref 98–107)
CO2: 29 MMOL/L (ref 20–31)
CREAT SERPL-MCNC: 0.33 MG/DL (ref 0.5–0.9)
FIO2: 80
GFR SERPL CREATININE-BSD FRML MDRD: >60 ML/MIN/1.73M2
GLUCOSE BLD-MCNC: 116 MG/DL (ref 70–99)
GLUCOSE BLD-MCNC: 131 MG/DL (ref 65–105)
GLUCOSE BLD-MCNC: 138 MG/DL (ref 74–100)
LIPASE: 67 U/L (ref 13–60)
LV EF: 65 %
LVEF MODALITY: NORMAL
MAGNESIUM: 1.7 MG/DL (ref 1.6–2.6)
O2 DEVICE/FLOW/%: ABNORMAL
POC HCO3: 31 MMOL/L (ref 21–28)
POC O2 SATURATION: 97 % (ref 94–98)
POC PCO2: 53.8 MM HG (ref 35–48)
POC PH: 7.37 (ref 7.35–7.45)
POC PO2: 93.1 MM HG (ref 83–108)
POSITIVE BASE EXCESS, ART: 4 (ref 0–3)
POTASSIUM SERPL-SCNC: 3.5 MMOL/L (ref 3.7–5.3)
POTASSIUM SERPL-SCNC: 3.9 MMOL/L (ref 3.7–5.3)
REASON FOR REJECTION: NORMAL
SAMPLE SITE: ABNORMAL
SODIUM BLD-SCNC: 141 MMOL/L (ref 135–144)
TOTAL PROTEIN: 6.5 G/DL (ref 6.4–8.3)
ZZ NTE CLEAN UP: ORDERED TEST: NORMAL
ZZ NTE WITH NAME CLEAN UP: SPECIMEN SOURCE: NORMAL

## 2023-01-05 PROCEDURE — 6360000002 HC RX W HCPCS: Performed by: STUDENT IN AN ORGANIZED HEALTH CARE EDUCATION/TRAINING PROGRAM

## 2023-01-05 PROCEDURE — 2580000003 HC RX 258: Performed by: NURSE PRACTITIONER

## 2023-01-05 PROCEDURE — 80076 HEPATIC FUNCTION PANEL: CPT

## 2023-01-05 PROCEDURE — 2500000003 HC RX 250 WO HCPCS: Performed by: STUDENT IN AN ORGANIZED HEALTH CARE EDUCATION/TRAINING PROGRAM

## 2023-01-05 PROCEDURE — 99291 CRITICAL CARE FIRST HOUR: CPT | Performed by: INTERNAL MEDICINE

## 2023-01-05 PROCEDURE — 2700000000 HC OXYGEN THERAPY PER DAY

## 2023-01-05 PROCEDURE — 6360000002 HC RX W HCPCS: Performed by: INTERNAL MEDICINE

## 2023-01-05 PROCEDURE — 36415 COLL VENOUS BLD VENIPUNCTURE: CPT

## 2023-01-05 PROCEDURE — 6370000000 HC RX 637 (ALT 250 FOR IP): Performed by: INTERNAL MEDICINE

## 2023-01-05 PROCEDURE — 6370000000 HC RX 637 (ALT 250 FOR IP): Performed by: STUDENT IN AN ORGANIZED HEALTH CARE EDUCATION/TRAINING PROGRAM

## 2023-01-05 PROCEDURE — 6370000000 HC RX 637 (ALT 250 FOR IP)

## 2023-01-05 PROCEDURE — 6360000002 HC RX W HCPCS: Performed by: NURSE PRACTITIONER

## 2023-01-05 PROCEDURE — 36600 WITHDRAWAL OF ARTERIAL BLOOD: CPT

## 2023-01-05 PROCEDURE — 93306 TTE W/DOPPLER COMPLETE: CPT

## 2023-01-05 PROCEDURE — 6370000000 HC RX 637 (ALT 250 FOR IP): Performed by: NURSE PRACTITIONER

## 2023-01-05 PROCEDURE — 2000000000 HC ICU R&B

## 2023-01-05 PROCEDURE — 94003 VENT MGMT INPAT SUBQ DAY: CPT

## 2023-01-05 PROCEDURE — 84132 ASSAY OF SERUM POTASSIUM: CPT

## 2023-01-05 PROCEDURE — 83735 ASSAY OF MAGNESIUM: CPT

## 2023-01-05 PROCEDURE — 94640 AIRWAY INHALATION TREATMENT: CPT

## 2023-01-05 PROCEDURE — 71045 X-RAY EXAM CHEST 1 VIEW: CPT

## 2023-01-05 PROCEDURE — 82947 ASSAY GLUCOSE BLOOD QUANT: CPT

## 2023-01-05 PROCEDURE — 82803 BLOOD GASES ANY COMBINATION: CPT

## 2023-01-05 PROCEDURE — 2580000003 HC RX 258: Performed by: STUDENT IN AN ORGANIZED HEALTH CARE EDUCATION/TRAINING PROGRAM

## 2023-01-05 PROCEDURE — 94761 N-INVAS EAR/PLS OXIMETRY MLT: CPT

## 2023-01-05 PROCEDURE — 83690 ASSAY OF LIPASE: CPT

## 2023-01-05 PROCEDURE — 6360000002 HC RX W HCPCS

## 2023-01-05 PROCEDURE — 80048 BASIC METABOLIC PNL TOTAL CA: CPT

## 2023-01-05 RX ORDER — METOPROLOL SUCCINATE 25 MG/1
12.5 TABLET, EXTENDED RELEASE ORAL DAILY
Status: DISCONTINUED | OUTPATIENT
Start: 2023-01-06 | End: 2023-01-06

## 2023-01-05 RX ORDER — SODIUM CHLORIDE 9 MG/ML
INJECTION, SOLUTION INTRAVENOUS CONTINUOUS
Status: ACTIVE | OUTPATIENT
Start: 2023-01-05 | End: 2023-01-06

## 2023-01-05 RX ORDER — LIDOCAINE HYDROCHLORIDE 20 MG/ML
15 SOLUTION OROPHARYNGEAL
Status: DISCONTINUED | OUTPATIENT
Start: 2023-01-05 | End: 2023-01-11 | Stop reason: HOSPADM

## 2023-01-05 RX ADMIN — PANCRELIPASE LIPASE, PANCRELIPASE PROTEASE, PANCRELIPASE AMYLASE 40000 UNITS: 20000; 63000; 84000 CAPSULE, DELAYED RELEASE ORAL at 08:40

## 2023-01-05 RX ADMIN — HYDROXYZINE HYDROCHLORIDE 50 MG: 50 TABLET ORAL at 21:35

## 2023-01-05 RX ADMIN — CARBAMAZEPINE 100 MG: 100 SUSPENSION ORAL at 21:35

## 2023-01-05 RX ADMIN — SODIUM CHLORIDE, PRESERVATIVE FREE 10 ML: 5 INJECTION INTRAVENOUS at 08:40

## 2023-01-05 RX ADMIN — SODIUM CHLORIDE, PRESERVATIVE FREE 10 ML: 5 INJECTION INTRAVENOUS at 21:36

## 2023-01-05 RX ADMIN — SUCRALFATE 1 G: 1 TABLET ORAL at 21:34

## 2023-01-05 RX ADMIN — SODIUM CHLORIDE: 9 INJECTION, SOLUTION INTRAVENOUS at 06:50

## 2023-01-05 RX ADMIN — SODIUM CHLORIDE, PRESERVATIVE FREE 10 ML: 5 INJECTION INTRAVENOUS at 21:35

## 2023-01-05 RX ADMIN — TOPIRAMATE 25 MG: 25 TABLET, FILM COATED ORAL at 21:35

## 2023-01-05 RX ADMIN — ENOXAPARIN SODIUM 40 MG: 100 INJECTION SUBCUTANEOUS at 08:35

## 2023-01-05 RX ADMIN — PIPERACILLIN AND TAZOBACTAM 3375 MG: 3; .375 INJECTION, POWDER, FOR SOLUTION INTRAVENOUS at 01:58

## 2023-01-05 RX ADMIN — METHYLPREDNISOLONE SODIUM SUCCINATE 20 MG: 40 INJECTION, POWDER, FOR SOLUTION INTRAMUSCULAR; INTRAVENOUS at 21:35

## 2023-01-05 RX ADMIN — ALBUTEROL SULFATE 2.5 MG: 2.5 SOLUTION RESPIRATORY (INHALATION) at 08:36

## 2023-01-05 RX ADMIN — IPRATROPIUM BROMIDE AND ALBUTEROL SULFATE 1 AMPULE: .5; 3 SOLUTION RESPIRATORY (INHALATION) at 20:27

## 2023-01-05 RX ADMIN — LIDOCAINE HYDROCHLORIDE 15 ML: 20 SOLUTION ORAL at 15:14

## 2023-01-05 RX ADMIN — BUSPIRONE HYDROCHLORIDE 30 MG: 15 TABLET ORAL at 21:34

## 2023-01-05 RX ADMIN — POTASSIUM CHLORIDE 40 MEQ: 1500 TABLET, EXTENDED RELEASE ORAL at 10:14

## 2023-01-05 RX ADMIN — ROPINIROLE HYDROCHLORIDE 1 MG: 1 TABLET, FILM COATED ORAL at 21:34

## 2023-01-05 RX ADMIN — TOPIRAMATE 25 MG: 25 TABLET, FILM COATED ORAL at 08:33

## 2023-01-05 RX ADMIN — FENTANYL CITRATE 50 MCG: 50 INJECTION INTRAMUSCULAR; INTRAVENOUS at 19:44

## 2023-01-05 RX ADMIN — METHYLPREDNISOLONE SODIUM SUCCINATE 20 MG: 40 INJECTION, POWDER, FOR SOLUTION INTRAMUSCULAR; INTRAVENOUS at 08:34

## 2023-01-05 RX ADMIN — PIPERACILLIN AND TAZOBACTAM 3375 MG: 3; .375 INJECTION, POWDER, FOR SOLUTION INTRAVENOUS at 08:45

## 2023-01-05 RX ADMIN — ALBUTEROL SULFATE 2.5 MG: 2.5 SOLUTION RESPIRATORY (INHALATION) at 15:22

## 2023-01-05 RX ADMIN — Medication 50 MCG/HR: at 23:28

## 2023-01-05 RX ADMIN — Medication 4 MG/HR: at 23:27

## 2023-01-05 RX ADMIN — BUSPIRONE HYDROCHLORIDE 30 MG: 15 TABLET ORAL at 08:34

## 2023-01-05 RX ADMIN — CARBAMAZEPINE 100 MG: 100 SUSPENSION ORAL at 08:34

## 2023-01-05 RX ADMIN — FUROSEMIDE 20 MG: 10 INJECTION, SOLUTION INTRAMUSCULAR; INTRAVENOUS at 08:35

## 2023-01-05 RX ADMIN — PREGABALIN 200 MG: 100 CAPSULE ORAL at 21:34

## 2023-01-05 RX ADMIN — PIPERACILLIN AND TAZOBACTAM 3375 MG: 3; .375 INJECTION, POWDER, FOR SOLUTION INTRAVENOUS at 16:04

## 2023-01-05 RX ADMIN — SUCRALFATE 1 G: 1 TABLET ORAL at 08:34

## 2023-01-05 RX ADMIN — MIRTAZAPINE 15 MG: 15 TABLET, FILM COATED ORAL at 21:34

## 2023-01-05 RX ADMIN — ALBUTEROL SULFATE 2.5 MG: 2.5 SOLUTION RESPIRATORY (INHALATION) at 12:14

## 2023-01-05 RX ADMIN — Medication 100 MG: at 08:34

## 2023-01-05 RX ADMIN — ATORVASTATIN CALCIUM 20 MG: 20 TABLET, FILM COATED ORAL at 08:42

## 2023-01-05 RX ADMIN — LISINOPRIL 5 MG: 5 TABLET ORAL at 08:39

## 2023-01-05 RX ADMIN — HYDROXYZINE HYDROCHLORIDE 50 MG: 50 TABLET ORAL at 08:34

## 2023-01-05 ASSESSMENT — PULMONARY FUNCTION TESTS
PIF_VALUE: 19
PIF_VALUE: 19
PIF_VALUE: 21
PIF_VALUE: 21
PIF_VALUE: 22
PIF_VALUE: 18
PIF_VALUE: 20
PIF_VALUE: 21
PIF_VALUE: 23
PIF_VALUE: 20
PIF_VALUE: 21
PIF_VALUE: 19
PIF_VALUE: 21
PIF_VALUE: 20

## 2023-01-05 NOTE — PROGRESS NOTES
INTENSIVE CARE UNIT  Resident Physician Progress Note    Patient - Jordon Sparrow  Date of Admission -  1/1/2023 12:55 PM  Date of Evaluation -  1/5/2023  Room and Bed Number -  7413/4383-49   Hospital Day - 4    Chief Complaint   Patient presents with    Fatigue    Fall    Respiratory Distress      SUBJECTIVE:     OVERNIGHT EVENTS: Overnight patient desatted, the respiratory therapist suctioned a lot and then the oxygen saturation came back up. Initially the FiO2 has to increase to 100%. Currently on 70% FiO2. At night he had to maxed out on propofol. TODAY: Patient is continued to be intubated and sedated. She is currently on 50 of fentanyl, 3 of Versed, propofol and Precedex has been turned off. Patient follows command and responds to her name. .  Currently on ventilator with the following vent setting:  PRVC/70% FiO2/18 respiratory rate/417 tidal volume/+5 PEEP  ABG shows pH 7.369, PCO2 53.8, Bicarb 31    Feeding tube feeding at 30,ADVANcE AS tolerated  Fluids - not on any fluids  Family updated  Analgesic Fentanyl  Sedation Versed  Thrombo-prophylaxis Lovenox  Mobility minimal  Heads up 30 Degrees  Ulcer prophylaxis Protonix 40 mg  Glycemic control point-of-care glucose 138  Spontaneous breathing trial : We will begin spontaneous breathing trial  Bowel regimen/urine output Carafate 1 g 4 times daily  Indwelling catheter/lines Nicholas catheter, OG tube, 3 peripheral IV  de-escalation SBT    Interval history on 1/5/2023/round update:  Echo was done showing hyperdynamic LVEF. We discontinued the Lasix. [employer] for today to  maintain his PO2 between 88 to 92% AND COME down on FiO2. We started normal saline at 75 mill per hour for the next 12 hours.     AWAKE & FOLLOWING COMMANDS:  [] No   [x] Yes    SECRETIONS Amount:  [x] Small [] Moderate  [] Large  [] None  Color:     [] White [] Colored  [] Bloody    SEDATION:  RAAS Score:  [x] Propofol gtt  [] Versed gtt  [] Ativan gtt   [] No Sedation    PARALYZED: [x] No    [] Yes    VASOPRESSORS:  [x] No    [] Yes  [] Levophed [] Dopamine [] Vasopressin  [] Dobutamine [] Phenylephrine [] Epinephrine      OBJECTIVE:     VITAL SIGNS:  BP 93/64   Pulse 70   Temp 98.4 °F (36.9 °C) (Core)   Resp 18   Ht 5' 6\" (1.676 m)   Wt 141 lb 8.6 oz (64.2 kg)   SpO2 92%   BMI 22.84 kg/m²   Tmax over 24 hours:  Temp (24hrs), Av.5 °F (36.9 °C), Min:96.6 °F (35.9 °C), Max:99.7 °F (37.6 °C)      Patient Vitals for the past 8 hrs:   BP Temp Temp src Pulse Resp SpO2   23 0836 -- -- -- 70 18 92 %   23 0800 93/64 98.4 °F (36.9 °C) CORE 67 18 95 %   23 0700 105/73 -- -- 66 18 94 %   23 0630 115/78 -- -- 68 18 95 %   23 0600 103/64 98.1 °F (36.7 °C) Bladder 68 16 98 %   23 0530 107/75 -- -- 63 16 95 %   23 0500 115/77 -- -- 64 18 97 %   23 0430 113/72 -- -- 63 16 97 %   23 0400 97/64 97.3 °F (36.3 °C) Bladder 63 18 96 %   23 0330 (!) 77/50 -- -- 58 18 94 %   23 0312 -- -- -- 58 18 92 %   23 0300 (!) 89/59 -- -- 59 18 92 %   23 0230 94/64 -- -- 60 19 97 %   23 0200 95/63 97.9 °F (36.6 °C) Bladder 61 18 98 %   23 0130 113/82 -- -- 64 18 99 %   23 0100 103/64 -- -- 65 18 96 %         Intake/Output Summary (Last 24 hours) at 2023 0845  Last data filed at 2023 0800  Gross per 24 hour   Intake 1555.19 ml   Output 1555 ml   Net 0.19 ml     Date 23 - 23 2359   Shift 1367-2131 [id number] 0907-9606 24 Hour Total   INTAKE   I.V.(mL/kg) 87.9(1.4)   87.9(1.4)   NG/GT(mL/kg) 47(0.7) 40(0.6)  87(1.4)   IV Piggyback(mL/kg) 550(8.6)   550(8.6)   Shift Total(mL/kg) 684. 9(10.7) 40(0.6)  724. 9(11.3)   OUTPUT   Urine(mL/kg/hr) 190(0.4)   190   Shift Total(mL/kg) 190(3)   190(3)   Weight (kg) 64.2 64.2 64.2 64.2     Wt Readings from Last 3 Encounters:   23 141 lb 8.6 oz (64.2 kg)   22 152 lb (68.9 kg)   22 146 lb 9.6 oz (66.5 kg)     Body mass index is 22.84 kg/m². PHYSICAL EXAM:  GEN:  Intubated and on ventilator  EYES:  Lids and lashes normal, pupils equal, round and reactive to light, extra ocular muscles intact, sclera clear, conjunctiva normal  HEENT:  Normocephalic, without obvious abnormality, atramatic, sinuses nontender on palpation, external ears without lesions, oral pharynx with moist mucus membranes, tonsils without erythema or exudates, gums normal and good dentition.   LUNGS:  No increased work of breathing, good air exchange, clear to auscultation bilaterally, no crackles or wheezing  CV:    Normal apical impulse, regular rate and rhythm, normal S1 and S2, no S3 or S4, and no murmur noted  ABDOMEN:   No scars, normal bowel sounds, soft, non-distended, non-tender, no masses palpated, no hepatosplenomegaly    EXTREMITIES:  No pedal or leg edema, no calf tenderness/swelling, no erythema, distal pulses intact       MEDICATIONS:  Scheduled Meds:   [Held by provider] metoprolol tartrate  25 mg Oral BID    carBAMazepine  100 mg Oral BID    methylPREDNISolone  20 mg IntraVENous Q12H    topiramate  25 mg Oral BID    furosemide  20 mg IntraVENous BID    ipratropium-albuterol  1 ampule Inhalation Q4H WA    piperacillin-tazobactam  3,375 mg IntraVENous Q8H    atorvastatin  20 mg Oral Daily    busPIRone  30 mg Oral BID    Pancrelipase (Lip-Prot-Amyl)  40,000 Units Oral TID WC    mirtazapine  15 mg Oral Nightly    pantoprazole  40 mg Oral QAM AC    pregabalin  200 mg Oral Nightly    rOPINIRole  1 mg Oral Nightly    sucralfate  1 g Oral 4x Daily    sodium chloride flush  5-40 mL IntraVENous 2 times per day    enoxaparin  40 mg SubCUTAneous Daily    lisinopril  5 mg Oral Daily    sodium chloride flush  5-40 mL IntraVENous 2 times per day    thiamine  100 mg Oral Daily    magnesium sulfate  2,000 mg IntraVENous Once     Continuous Infusions:   propofol Stopped (01/05/23 0709)    fentaNYL 50 mcg/mL 50 mcg/hr (01/05/23 0701)    midazolam 3 mg/hr (01/05/23 0701)    sodium chloride 10 mL/hr at 01/05/23 0701    dexmedetomidine Stopped (01/04/23 1426)    sodium chloride 10 mL/hr at 01/01/23 2312     PRN Meds:   lidocaine viscous hcl, 15 mL, Q3H PRN  fentanNYL, 50 mcg, Q2H PRN  albuterol, 2.5 mg, Q4H PRN  hydrOXYzine HCl, 50 mg, TID PRN  sodium chloride flush, 10 mL, PRN  sodium chloride, , PRN  polyethylene glycol, 17 g, Daily PRN  acetaminophen, 650 mg, Q6H PRN   Or  acetaminophen, 650 mg, Q6H PRN  potassium chloride, 40 mEq, PRN   Or  potassium alternative oral replacement, 40 mEq, PRN   Or  potassium chloride, 10 mEq, PRN  magnesium sulfate, 2,000 mg, PRN  sodium chloride flush, 5-40 mL, PRN  sodium chloride, , PRN  LORazepam, 1 mg, Q1H PRN   Or  LORazepam, 1 mg, Q1H PRN   Or  LORazepam, 2 mg, Q1H PRN   Or  LORazepam, 2 mg, Q1H PRN   Or  LORazepam, 3 mg, Q1H PRN   Or  LORazepam, 3 mg, Q1H PRN   Or  LORazepam, 4 mg, Q1H PRN   Or  LORazepam, 4 mg, Q1H PRN        SUPPORT DEVICES: [x] Ventilator [] BIPAP  [] Nasal Cannula [] Room Air    VENT SETTINGS (Comprehensive) (if applicable):   PRVC mode, FiO2 70%, PEEP +5, Respiratory Rate 25, Tidal Volume 630  Vent Information  Ventilator ID: [id number]  Ventilator Initiate: Yes  Additional Respiratory Assessments  Heart Rate: 70  Resp: 18  SpO2: 92 %  End Tidal CO2: 35 (%)  Position: Semi-Enriquez's  Humidification Source: [facility]  Humidification Temp: 33    ABGs:   Arterial Blood Gas result:  pH 7.369; pCO2 53.8; pO2 93.1; HCO3 31; BE  Lab Results   Component Value Date/Time    GME2JMQ NOT REPORTED 07/26/2021 03:38 PM    FIO2 80.0 01/05/2023 04:26 AM         DATA:  Complete Blood Count:   Recent Labs     01/03/23  0428 01/04/23  0705   WBC 7.7 6.3   RBC 3.99 4.08   HGB 11.2* 11.8*   HCT 35.8* 36.1*   MCV 89.7 88.5   MCH 28.1 28.9   MCHC 31.3 32.7   RDW 15.9* 15.5*    371   MPV 10.4 10.3        Last 3 Blood Glucose:   Recent Labs     01/03/23  0428 01/04/23  0705 01/05/23  0625   GLUCOSE 110* 114* 116*        PT/INR:    Lab Results   Component Value Date/Time    PROTIME 11.1 01/01/2023 01:12 PM    INR 1.0 01/01/2023 01:12 PM     PTT:    Lab Results   Component Value Date/Time    APTT 25.8 01/01/2023 01:12 PM       Comprehensive Metabolic Profile:   Recent Labs     01/03/23  0428 01/04/23  0705 01/05/23  0625    139 141   K 4.2 3.8 3.5*    101 101   CO2 23 28 29   BUN 27* 23* 23*   CREATININE 0.42* 0.46* 0.33*   GLUCOSE 110* 114* 116*   CALCIUM 7.0* 7.2* 7.4*      Magnesium:   Lab Results   Component Value Date/Time    MG 1.7 01/05/2023 06:25 AM    MG 1.8 01/04/2023 07:05 AM    MG 2.0 01/03/2023 04:28 AM     Phosphorus:   Lab Results   Component Value Date/Time    PHOS 3.7 07/23/2021 06:29 AM    PHOS 2.5 10/27/2019 08:33 PM    PHOS 4.0 07/14/2019 05:29 AM     Ionized Calcium:   Lab Results   Component Value Date/Time    CAION 0.94 01/01/2023 08:21 PM    CAION 0.93 01/01/2023 03:05 PM    CAION 1.30 07/22/2021 03:22 AM        Urinalysis:   Lab Results   Component Value Date/Time    NITRU NEGATIVE 01/01/2023 01:54 PM    COLORU DARK YELLOW 01/01/2023 01:54 PM    PHUR 5.5 01/01/2023 01:54 PM    WBCUA 2 TO 5 01/01/2023 01:54 PM    RBCUA 0 TO 2 01/01/2023 01:54 PM    MUCUS 2+ 09/01/2022 11:50 AM    TRICHOMONAS NOT REPORTED 10/12/2021 04:15 PM    YEAST MANY 01/01/2023 01:54 PM    BACTERIA MODERATE 01/01/2023 01:54 PM    CLARITYU cloudy 12/18/2018 03:46 PM    SPECGRAV 1.020 01/01/2023 01:54 PM    LEUKOCYTESUR SMALL 01/01/2023 01:54 PM    UROBILINOGEN ELEVATED 01/01/2023 01:54 PM    BILIRUBINUR NEGATIVE  Verified by ictotest. 01/01/2023 01:54 PM    BILIRUBINUR moderate 12/18/2018 03:46 PM    BLOODU neg 12/18/2018 03:46 PM    GLUCOSEU NEGATIVE 01/01/2023 01:54 PM    KETUA SMALL 01/01/2023 01:54 PM    AMORPHOUS 2+ 09/01/2022 11:50 AM       HgBA1c:    Lab Results   Component Value Date/Time    LABA1C 5.5 04/13/2021 11:34 AM     TSH:    Lab Results   Component Value Date/Time    TSH 1.40 01/02/2023 04:03 AM     Lactic Acid: No results found for: Fallon Ross Troponin: No results for input(s): TROPONINI in the last 72 hours.         ASSESSMENT:     Patient Active Problem List    Diagnosis Date Noted    Acute on chronic respiratory failure with hypoxia ([address] 01/01/2023    Intractable migraine without aura and without status migrainosus 05/24/2022    Sleep disturbance 05/04/2022    Moderate episode of recurrent major depressive disorder ([address] 12/14/2021    Adenomatous polyp of sigmoid colon     Hyponatremia 10/12/2021    Iron deficiency anemia 10/12/2021    Sepsis ([address] 08/09/2021    Acute recurrent pancreatitis 08/09/2021    Atelectasis of left lung 08/09/2021    Fluid collection of pancreas     Diverticulitis of large intestine without perforation or abscess with bleeding     Pseudocyst of pancreas     Alcohol-induced acute pancreatitis 07/31/2021    Community acquired pneumonia of left lower lobe of lung 07/29/2021    Septicemia ([address]     Metabolic acidosis with increased anion gap and accumulation of organic acids 07/28/2021    Compression fracture of thoracic vertebra (HCC)     Pathological compression fracture of lumbar vertebra with delayed healing     Closed fracture of fifth thoracic vertebra ([address] 07/22/2021    Diverticulitis of large intestine with abscess without bleeding 07/22/2021    Elevated alkaline phosphatase level 07/22/2021    Chronic pancreatitis ([address] 07/22/2021    Erythema ab igne 07/22/2021    Severe sepsis ([address] 07/21/2021    Multilevel spine pain 06/30/2021    Chronic pain syndrome 06/30/2021    Marijuana abuse 06/30/2021    Chronic peripheral neuropathic pain 05/04/2021    History of alcohol abuse 05/04/2021    History of petit-mal seizures 05/04/2021    Intractable episodic tension-type headache 05/04/2021    Personal history of astrocytoma 05/04/2021    Esophageal dysphagia     Major depressive disorder, recurrent severe without psychotic features ([address] 03/31/2021    AMAN (generalized anxiety disorder) 03/22/2021    Perineal mass, female 10/23/2020    Esophagitis candidal  10/23/2020    Condyloma 10/23/2020    [address]rocytoma ([facility] - diagnosed at age 24, the patient underwent 2 surgical resections without known recurrence 10/23/2020    Alcohol use disorder, severe, in early remission ([address] 61/44/2213    Metabolic encephalopathy     Recurrent major depressive disorder ([address] 10/20/2020    Elevated CA 19-9 level 10/20/2020    Pancreatic cyst 10/19/2020    Acute respiratory failure with hypoxia ([address] 10/16/2020    Paresthesia of lower extremity 10/13/2020    COPD with acute exacerbation ([address] 10/12/2020    Seizure disorder ([address]     Ambulatory dysfunction 12/17/2019    Tobacco use     Hypomagnesemia 81/65/4559    Alcoholic peripheral neuropathy ([address] 12/14/2019    Hypokalemia 12/14/2019    Macrocytic anemia 12/14/2019    Psychophysiological insomnia 06/05/2019    Anxiety 06/05/2019    Essential hypertension 08/13/2015    Nicotine addiction 08/13/2015    Reflex sympathetic dystrophy of lower limb 03/27/2014    Acute bronchitis 10/02/2012          PLAN:     WEAN PER PROTOCOL:  [] No   [x] Yes  [] N/A      VTE:   [] Enoxaparin  [] Unfract.  Heparin Subcut  [x] EPC Cuffs    NUTRITION:  [] NPO [x] Tube Feeding (Specify: ) [] TPN  [] PO    HOME MEDS RECONCILED: [] No  [x] Yes    CONSULTATION NEEDED:  [] No  [x] Yes    FAMILY UPDATED:    [] No  [x] Yes    TRANSFER OUT OF ICU:   [x] No  [] Yes              Plan:  Neuro  Metabolic encephalopathy  Hx alcohol abuse, hx chronic pancreatitis  Precedex gtt  Hx anxiety, on atarax at home  Hx COPD, on 4 L NC at home, has not been compliant  Seizure precautions  CIWA protocol  Buspar 30 BID  Carbamazepine 200 TID, Topamax 50 BID  Remeron 15  Pregabalin 200  Requip 1 mg  Patient is intubated, sedated, currently on ventilator    Neurochecks per protocol        Cardiology  Cardiology consulted  EKG 1/1 with T wave inversions laterally  Avoid Qtc prolonging medications  Hx seizure disorder on carbamazepine  Lipitor  Coreg 3.125 BID  Lisinopril 5 daily  Echo showed hyperdynamic circulation, Lasix discontinued     Pulmonary  Intubated, sedated currently on ventilator with above-mentioned vent setting  DuoNeb  Symbicort  Monitor respiratory status  ABG PRN     Renal  Monitor intake output  Cr 0.54     GI  Tube feeding, orogastric peptide-based  Hx alcohol abuse with chronic pancreatitis  Ammonia 69  Lactulose 20 g TID  Lipase-protease-amylase 30,000 TID  Pancrelipase 40,000 TID  CT abdomen showing N/V mid. Patient. Pericholecystic fluid, ultrasonogram right upper quadrant and gallbladder showed a cystlike etiology alcoholic  Protonix 40   Carafate 1 g QID  Thiamine  Alk phos elevated AST to ALT ratio >2 indicating alcoholic cirrhosis     ID  Zosyn  Given vanco and zosyn in ED  WBC 6.3     Endo  Glucose 2000 [place] Nick Perez MD  Internal Medicine Resident,PGY [address]   Attending Physician Statement  I have discussed the care of Richi Quinn, including pertinent history and exam findings,  with the resident. I have seen and examined the patient and the key elements of all parts of the encounter have been performed by me. I agree with the assessment, plan and orders as documented by the resident with additions . Cont vent support   Wean fio2 keep sats 88-92 %   Iv fluids for 12 hrs   bronchodilator   . steroids        Total critical care time caring for this patient with life threatening, unstable organ failure, including direct patient contact, management of life support systems, review of data including imaging and labs, discussions with other team members and physicians at least 27   Min so far today, excluding procedures. Electronically signed by Mindi Mishra MD on   1/5/23 at 10:12 PM EST    Please note that this chart was generated using voice recognition Dragon dictation software.   Although every effort was made to ensure the accuracy of this automated transcription, some errors in transcription may have occurred.

## 2023-01-05 NOTE — PROGRESS NOTES
[facility]   Consult Note                 Date:   1/5/2023  Patient name: Leticia Garcia  Date of admission:  1/1/2023 12:55 PM  MRN:   [id number]  YOB: 1970    Reason for Consult: Elevated troponin    CHIEF COMPLAINT: AMS    History Obtained From:  Patient       SUBJECTIVE:  HR 60-70s  BP 93/64 on mech vent with FiO2 70%  K 3.5  1.45 L UOP, net -1.45 L  Lopressor 25 mg twice daily continues to be held    HISTORY OF PRESENT ILLNESS:    The patient is a 47 y.o. female presenting on January 1 after being found altered mental status at home after home oxygen. In the ED, CT chest showed signs of groundglass opacities and pulmonary venous congestion and was placed on nonrebreather. Patient suspected to have sepsis on admission, and was found to have elevated troponins. Patient has a past medical history for COPD on home oxygen, MDD, ethanol abuse, chronic pancreatitis, seizure disorder, hypertension, active smoker, polysubstance abuse. Past Medical History:   has a past medical history of Acute respiratory failure with hypoxia ([address] Alcohol withdrawal syndrome, with delirium ([address] Alcoholism ([address] Anal warts, Anemia, Anxiety, 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24, the patient underwent 2 surgical resections without known recurrence, Bandemia, Closed fracture of lateral portion of left tibial plateau, Condyloma, COPD (chronic obstructive pulmonary disease) ([address] Depression, Dysphagia, GI bleed, Hypertension, Memory loss, Oxygen dependent, Pain, joint, ankle and foot, Pancreatic lesion, Perianal wart, Peripheral neuropathy, Seizures ([address] Tension headache, Under care of team, Under care of team, Under care of team, Under care of team, Under care of team, Under care of team, Wears glasses, Wears partial dentures, and Wellness examination. Past Surgical History:   has a past surgical history that includes Hysterectomy (2003);  Brain tumor excision (1989); fracture surgery (Left, 07/03/2013); fracture surgery (Right); Upper gastrointestinal endoscopy (N/A, 10/22/2020); Colonoscopy (N/A, 10/22/2020); Endoscopic ultrasonography, GI (N/A, 12/09/2020); Upper gastrointestinal endoscopy (N/A, 04/05/2021); Hand surgery; CT ABSCESS DRAINAGE (07/28/2021); ERCP (N/A, 08/11/2021); ERCP (08/11/2021); Upper gastrointestinal endoscopy (N/A, 09/08/2021); Spine surgery (N/A, 09/10/2021); ERCP (N/A, 10/21/2021); ERCP (10/21/2021); ERCP (10/21/2021); Colonoscopy (N/A, 11/19/2021); Anus surgery (N/A, 01/25/2022); endoscopic ultrasound (upper) (02/09/2022); Upper gastrointestinal endoscopy (N/A, 2/9/2022); and Upper gastrointestinal endoscopy (2/9/2022).     Home Medications:    Prior to Admission medications    Medication Sig Start Date End Date Taking? Authorizing Provider   lipase-protease-amylase (CREON) 68663-14854 units delayed release capsule TAKE ONE CAPSULE BY MOUTH THREE TIMES A DAY WITH A MEAL 12/19/22   Ludivina Grimm MD   baclofen (LIORESAL) 10 MG tablet Take 1 tablet by mouth 3 times daily 12/14/22   Ludivina Grimm MD   metoclopramide (REGLAN) 5 MG tablet TAKE ONE TABLET BY MOUTH THREE TIMES A DAY 11/15/22   Manjeet Camacho MD   fluticasone-umeclidin-vilant (TRELEGY ELLIPTA) 100-62.5-25 MCG/INH AEPB Inhale 1 puff into the lungs daily 11/1/22   LOI Contreras CNP   pregabalin (LYRICA) 200 MG capsule Take 1 capsule by mouth nightly for 30 days. 11/1/22 12/1/22  LOI Contreras CNP   albuterol sulfate HFA (VENTOLIN HFA) 108 (90 Base) MCG/ACT inhaler Inhale 2 puffs into the lungs 4 times daily as needed for Wheezing 11/1/22   LOI Contreras CNP   amLODIPine (NORVASC) 10 MG tablet TAKE ONE TABLET BY MOUTH DAILY 10/17/22   Ludivina Grimm MD   verapamil (CALAN SR) 120 MG extended release tablet Take 1 tablet by mouth daily 10/13/22   LOI Young CNP   varenicline (CHANTIX) 1 MG tablet TAKE ONE TABLET BY MOUTH TWICE A DAY (START AFTER FINISHING TAPER) 10/3/22  Ludivina Paniagua MD   hydrOXYzine HCl (ATARAX) 50 MG tablet  9/13/22   Historical Provider, MD   omeprazole (PRILOSEC) 40 MG delayed release capsule Take 1 capsule by mouth in the morning and at bedtime 9/19/22   Buckley Duane, MD   sucralfate (CARAFATE) 1 GM tablet Take 1 tablet by mouth 4 times daily 9/19/22   Buckley Duane, MD   atorvastatin (LIPITOR) 20 MG tablet Take 1 tablet by mouth daily 9/6/22   LOI Cortez CNP   mirtazapine (REMERON) 15 MG tablet  7/21/22   Elizabethyeimy Dey   REXULTI 1 MG TABS tablet  8/9/22   Elizabethyeimy Dey   topiramate (TOPAMAX) 50 MG tablet Take 1 tablet by mouth 2 times daily 8/29/22   LOI Cortez CNP   busPIRone (BUSPAR) 30 MG tablet Take 1 tablet by mouth in the morning and at bedtime 7/21/22   Elizabeth Dey   solifenacin (VESICARE) 10 MG tablet Take 1 tablet by mouth in the morning. 7/20/22   Xiomara MASTERS MD   rOPINIRole (REQUIP) 1 MG tablet TAKE ONE TABLET BY MOUTH ONCE NIGHTLY 7/11/22   LOI Hughes NP   carBAMazepine (TEGRETOL) 200 MG tablet TAKE ONE TABLET BY MOUTH THREE TIMES A DAY 7/11/22   LOI Hughes NP   Handicap Placard MISC by Does not apply route Expires 5/2027 5/9/22   Ludivina Paniagua MD   prazosin (MINIPRESS) 1 MG capsule Take 1 capsule by mouth nightly 5/4/22   LOI Hughes NP   nicotine polacrilex (NICOTINE MINI) 4 MG lozenge Take 1 lozenge by mouth as needed for Smoking cessation Weeks 1 to 6: 1 lozenge every 1 to 2 hours. Weeks 7 to 9: 1 lozenge every 2 to 4 hours. Weeks 10 to 12: 1 lozenge every 4 to 8 hours. Maximum 20 lozenges per day. 4/7/22   LOI Hughes NP   sodium chloride 1 g tablet Take 1 tablet by mouth 4 times daily Note increase in dose per Dr Fannie Rod 4/4/22   MD quan Bowen (SENOKOT) 8.6 MG tablet Take 1 tablet by mouth 2 times daily 1/25/22 1/25/23  Hina Serna,    lidocaine (XYLOCAINE) 5 % ointment Apply topically as needed.  1/25/22   Hina Serna DO simethicone (MYLICON) 80 MG chewable tablet Take 1 tablet by mouth 4 times daily as needed for Flatulence 8/27/21   Jordyn Lara MD   vitamin D (ERGOCALCIFEROL) 80924 units CAPS capsule Take 1 capsule by mouth once a week 6/19/19   Ludivina Sifuentes MD   folic acid (FOLVITE) 1 MG tablet Take 1 tablet by mouth daily 6/19/19   Ludivina Sifuentes MD       Allergies:  Patient has no known allergies. Social History:   reports that she has been smoking cigarettes. She has a 30.00 pack-year smoking history. She has never used smokeless tobacco. She reports that she does not currently use alcohol. She reports current drug use. Frequency: 2.00 times per week. Family History: family history includes Arthritis in her mother; Cancer in her maternal grandmother; Esophageal Cancer in her maternal aunt; Heart Disease in her paternal grandmother; Other in her father. No for premature CAD. No for h/o sudden cardiac death    REVIEW OF SYSTEMS:    Unable to assess ROS, intubated and sedated      PHYSICAL EXAM:    Physical Examination:    BP 93/64   Pulse 70   Temp 98.4 °F (36.9 °C) (Core)   Resp 18   Ht 5' 6\" (1.676 m)   Wt 141 lb 8.6 oz (64.2 kg)   SpO2 92%   BMI 22.84 kg/m²    Constitutional and General Appearance:  altered   HEENT: PERRL, no cervical lymphadenopathy  Respiratory: On mechanical ventilation  Coarse breath sounds to auscultation bilaterally  Cardiovascular:  regular S1 and S2. No murmur audible   No Jugular venous distention, no hepatojugular reflex  Peripheral pulses are symmetrical and full   Abdomen:  No masses or tenderness  Bowel sounds present  Extremities:   No Cyanosis or Clubbing   Lower extremity edema: No   Skin: Warm and dry  Neurological:  Not done       DATA:    Diagnostics:      EKG  1/2/23 sinus rhythm, first-degree AV block, Normal axis, T wave inversion in anterior leads, prolonged QTc    ECHO  7/22/21  Left ventricle is normal in size.  Global left ventricular systolic function  is difficult to assess due to heart rate but appears normal. Calculated  ejection fraction 70% by Guadarrama's method. Visually estimated EF 60-65%. Left atrium is normal in size. Lipomatous atrial septum. No shunt seen by color Doppler. Normal right ventricular size and function. No significant valvular regurgitation or stenosis seen. Coronary angiography: Not done  Labs:     CBC:   Recent Labs     01/03/23  0428 01/04/23  0705   WBC 7.7 6.3   HGB 11.2* 11.8*   HCT 35.8* 36.1*    371       BMP:   Recent Labs     01/04/23  0705 01/05/23  0625    141   K 3.8 3.5*   CO2 28 29   BUN 23* 23*   CREATININE 0.46* 0.33*   LABGLOM >60 >60   GLUCOSE 114* 116*       BNP: No results for input(s): BNP in the last 72 hours. PT/INR:   No results for input(s): PROTIME, INR in the last 72 hours. APTT:  No results for input(s): APTT in the last 72 hours. CARDIAC ENZYMES:No results for input(s): CKTOTAL, CKMB, CKMBINDEX, TROPONINI in the last 72 hours. FASTING LIPID PANEL:  Lab Results   Component Value Date/Time    HDL 62 09/01/2022 11:49 AM    TRIG 130 09/01/2022 11:49 AM     LIVER PROFILE:  No results for input(s): AST, ALT, LABALBU in the last 72 hours. IMPRESSION:    Elevated troponin likely demand ischemia in the setting of sepsis  Acute CHF exacerbation  Acute hypoxic respiratory failure 2/2 RLL collapse, possible mucus plug  Hypertension  Anemia  COPD  MDD  Smoking history  Polysubstance abuse   chronic pancreatitis  Hypokalemia      RECOMMENDATIONS:  Follow up on echocardiogram  Avoid QTC prolonging medication  Replace K to keep K greater than 4, mag greater than 2  Rest of care as per efrain Joy  PGY-3  Internal Medicine  [address]  1/5/2023 8:45 AM    I performed a history and physical examination of the patient and discussed management with the resident. I reviewed the residents note and agree with the documented findings and plan of care. Any areas of disagreement are noted on the chart. I was personally present for the key portions of any procedures. I have documented in the chart those procedures where I was not present during the key portions. I have personally evaluated this patient and have completed at least one if not all key elements of the E/M (history, physical exam, and MDM). Additional findings are as noted. Reviewed echo. Hyperdynamic LV function with possible LV mid gradient elevation due to hyperdynamic function. DC lisinopril. Toprol XL 12.5mg po qday. Replace K. Recommend stopping IV lasix for now.      Laverne Gay MD

## 2023-01-05 NOTE — PLAN OF CARE
Problem: Discharge Planning  Goal: Discharge to home or other facility with appropriate resources  1/5/2023 0827 by Elizabeth Pierre RN  Outcome: Progressing  1/5/2023 0624 by Severa Argyle, RN  Outcome: Progressing  1/5/2023 0624 by Severa Argyle, RN  Outcome: Progressing  Flowsheets (Taken 1/4/2023 2000)  Discharge to home or other facility with appropriate resources: Identify barriers to discharge with patient and caregiver     Problem: Pain  Goal: Verbalizes/displays adequate comfort level or baseline comfort level  1/5/2023 0827 by Elizabeth Pierre RN  Outcome: Progressing  1/5/2023 0624 by Severa Argyle, RN  Outcome: Progressing  1/5/2023 0624 by Severa Argyle, RN  Outcome: Progressing  Flowsheets  Taken 1/5/2023 0000  Verbalizes/displays adequate comfort level or baseline comfort level: Assess pain using appropriate pain scale  Taken 1/4/2023 2000  Verbalizes/displays adequate comfort level or baseline comfort level: Assess pain using appropriate pain scale     Problem: Skin/Tissue Integrity  Goal: Absence of new skin breakdown  Description: 1. Monitor for areas of redness and/or skin breakdown  2. Assess vascular access sites hourly  3. Every 4-6 hours minimum:  Change oxygen saturation probe site  4. Every 4-6 hours:  If on nasal continuous positive airway pressure, respiratory therapy assess nares and determine need for appliance change or resting period.   1/5/2023 0827 by Elizabeth Pierre RN  Outcome: Progressing  1/5/2023 0624 by Severa Argyle, RN  Outcome: Progressing  1/5/2023 0624 by Severa Argyle, RN  Outcome: Progressing     Problem: Nutrition Deficit:  Goal: Optimize nutritional status  1/5/2023 0827 by Elizabeth Pierre RN  Outcome: Progressing  1/5/2023 0624 by Severa Argyle, RN  Outcome: Progressing  1/5/2023 0624 by Severa Argyle, RN  Outcome: Progressing     Problem: Respiratory - Adult  Goal: Achieves optimal ventilation and oxygenation  1/5/2023 0827 by Elizabeth Pierre RN  Outcome: Progressing  1/5/2023 0624 by Keshawn Sheehan RN  Outcome: Progressing  1/5/2023 0624 by Keshawn Sheehan RN  Outcome: Progressing  1/5/2023 0616 by Deon Burden RCP  Outcome: Progressing  Flowsheets  Taken 1/5/2023 0616 by Deon Burden RCP  Achieves optimal ventilation and oxygenation:   Assess for changes in respiratory status   Position to facilitate oxygenation and minimize respiratory effort   Respiratory therapy support as indicated   Assess for changes in mentation and behavior   Oxygen supplementation based on oxygen saturation or arterial blood gases   Encourage broncho-pulmonary hygiene including cough, deep breathe, incentive spirometry   Assess and instruct to report shortness of breath or any respiratory difficulty  Taken 1/4/2023 2000 by Keshawn Sheehan RN  Achieves optimal ventilation and oxygenation: Assess for changes in respiratory status     Problem: Confusion  Goal: Confusion, delirium, dementia, or psychosis is improved or at baseline  Description: INTERVENTIONS:  1. Assess for possible contributors to thought disturbance, including medications, impaired vision or hearing, underlying metabolic abnormalities, dehydration, psychiatric diagnoses, and notify attending LIP  2. [place] high risk fall precautions, as indicated  3. Provide frequent short contacts to provide reality reorientation, refocusing and direction  4. Decrease environmental stimuli, including noise as appropriate  5. Monitor and intervene to maintain adequate nutrition, hydration, elimination, sleep and activity  6. If unable to ensure safety without constant attention obtain sitter and review sitter guidelines with assigned personnel  7.  Initiate Psychosocial CNS and Spiritual Care consult, as indicated  1/5/2023 0827 by Karina Goncalves RN  Outcome: Progressing  1/5/2023 0624 by Keshawn Sheehan RN  Outcome: Progressing  1/5/2023 0624 by Keshawn Sheehan RN  Outcome: Progressing     Problem: Safety - Adult  Goal: Free from fall injury  1/5/2023 0827 by Karina Goncalves RN  Outcome: Progressing  1/5/2023 0624 by Frank Duvall RN  Outcome: Progressing  1/5/2023 0624 by Frank Duvall RN  Outcome: Progressing     Problem: ABCDS Injury Assessment  Goal: Absence of physical injury  1/5/2023 0827 by Julio Covarrubias RN  Outcome: Progressing  1/5/2023 0624 by Frank Duvall RN  Outcome: Progressing  1/5/2023 0624 by Frank Duvall RN  Outcome: Progressing     Problem: Safety - Medical Restraint  Goal: Remains free of injury from restraints (Restraint for Interference with Medical Device)  Description: INTERVENTIONS:  1. Determine that other, less restrictive measures have been tried or would not be effective before applying the restraint  2. Evaluate the patient's condition at the time of restraint application  3. Inform patient/family regarding the reason for restraint  4.  Q2H: Monitor safety, psychosocial status, comfort, nutrition and hydration  1/5/2023 0827 by Julio Covarrubias RN  Outcome: Progressing  Flowsheets (Taken 1/5/2023 0800)  Remains free of injury from restraints (restraint for interference with medical device): Determine that other, less restrictive measures have been tried or would not be effective before applying the restraint  1/5/2023 0624 by Frank Duvall RN  Outcome: Progressing  Flowsheets  Taken 1/5/2023 0400  Remains free of injury from restraints (restraint for interference with medical device): Every 2 hours: Monitor safety, psychosocial status, comfort, nutrition and hydration  Taken 1/5/2023 0002  Remains free of injury from restraints (restraint for interference with medical device): Every 2 hours: Monitor safety, psychosocial status, comfort, nutrition and hydration  Taken 1/4/2023 2200  Remains free of injury from restraints (restraint for interference with medical device): Every 2 hours: Monitor safety, psychosocial status, comfort, nutrition and hydration  Taken 1/4/2023 2000  Remains free of injury from restraints (restraint for interference with medical device): Every 2 hours: Monitor safety, psychosocial status, comfort, nutrition and hydration

## 2023-01-05 NOTE — PLAN OF CARE
Problem: Discharge Planning  Goal: Discharge to home or other facility with appropriate resources  1/5/2023 0624 by Lien Olson RN  Outcome: Progressing  1/5/2023 0624 by Lien Olson RN  Outcome: Progressing  Flowsheets (Taken 1/4/2023 2000)  Discharge to home or other facility with appropriate resources: Identify barriers to discharge with patient and caregiver     Problem: Pain  Goal: Verbalizes/displays adequate comfort level or baseline comfort level  1/5/2023 0624 by Lien Olson RN  Outcome: Progressing  1/5/2023 0624 by Lien Olson RN  Outcome: Progressing  Flowsheets  Taken 1/5/2023 0000  Verbalizes/displays adequate comfort level or baseline comfort level: Assess pain using appropriate pain scale  Taken 1/4/2023 2000  Verbalizes/displays adequate comfort level or baseline comfort level: Assess pain using appropriate pain scale     Problem: Skin/Tissue Integrity  Goal: Absence of new skin breakdown  Description: 1. Monitor for areas of redness and/or skin breakdown  2. Assess vascular access sites hourly  3. Every 4-6 hours minimum:  Change oxygen saturation probe site  4. Every 4-6 hours:  If on nasal continuous positive airway pressure, respiratory therapy assess nares and determine need for appliance change or resting period.   1/5/2023 0624 by Lien Olson RN  Outcome: Progressing  1/5/2023 0624 by Lien Olson RN  Outcome: Progressing     Problem: Nutrition Deficit:  Goal: Optimize nutritional status  1/5/2023 0624 by Lien Olson RN  Outcome: Progressing  1/5/2023 0624 by Lien Olson RN  Outcome: Progressing     Problem: Respiratory - Adult  Goal: Achieves optimal ventilation and oxygenation  1/5/2023 0624 by Lien Olson RN  Outcome: Progressing  1/5/2023 0624 by Lien Olson RN  Outcome: Progressing  1/5/2023 0616 by Bryan Baires RCP  Outcome: Progressing  Flowsheets  Taken 1/5/2023 0616 by Bryan Baires RCP  Achieves optimal ventilation and oxygenation:   Assess for changes in respiratory status   Position to facilitate oxygenation and minimize respiratory effort   Respiratory therapy support as indicated   Assess for changes in mentation and behavior   Oxygen supplementation based on oxygen saturation or arterial blood gases   Encourage broncho-pulmonary hygiene including cough, deep breathe, incentive spirometry   Assess and instruct to report shortness of breath or any respiratory difficulty  Taken 1/4/2023 2000 by Maxine Lieberman RN  Achieves optimal ventilation and oxygenation: Assess for changes in respiratory status     Problem: Confusion  Goal: Confusion, delirium, dementia, or psychosis is improved or at baseline  Description: INTERVENTIONS:  1. Assess for possible contributors to thought disturbance, including medications, impaired vision or hearing, underlying metabolic abnormalities, dehydration, psychiatric diagnoses, and notify attending LIP  2. [place] high risk fall precautions, as indicated  3. Provide frequent short contacts to provide reality reorientation, refocusing and direction  4. Decrease environmental stimuli, including noise as appropriate  5. Monitor and intervene to maintain adequate nutrition, hydration, elimination, sleep and activity  6. If unable to ensure safety without constant attention obtain sitter and review sitter guidelines with assigned personnel  7.  Initiate Psychosocial CNS and Spiritual Care consult, as indicated  1/5/2023 5823 by Maxine Lieberman RN  Outcome: Progressing  1/5/2023 0624 by Maxine Lieberman RN  Outcome: Progressing     Problem: Safety - Adult  Goal: Free from fall injury  1/5/2023 0624 by Maxine Lieberman RN  Outcome: Progressing  1/5/2023 0624 by Maxine Lieberman RN  Outcome: Progressing     Problem: ABCDS Injury Assessment  Goal: Absence of physical injury  1/5/2023 0624 by Maxine Lieberman RN  Outcome: Progressing  1/5/2023 0624 by Maxine Libeerman RN  Outcome: Progressing     Problem: Safety - Medical Restraint  Goal: Remains free of injury from restraints (Restraint for Interference with Medical Device)  Description: INTERVENTIONS:  1. Determine that other, less restrictive measures have been tried or would not be effective before applying the restraint  2. Evaluate the patient's condition at the time of restraint application  3. Inform patient/family regarding the reason for restraint  4.  Q2H: Monitor safety, psychosocial status, comfort, nutrition and hydration  Outcome: Progressing  Flowsheets  Taken 1/5/2023 0400  Remains free of injury from restraints (restraint for interference with medical device): Every 2 hours: Monitor safety, psychosocial status, comfort, nutrition and hydration  Taken 1/5/2023 0002  Remains free of injury from restraints (restraint for interference with medical device): Every 2 hours: Monitor safety, psychosocial status, comfort, nutrition and hydration  Taken 1/4/2023 2200  Remains free of injury from restraints (restraint for interference with medical device): Every 2 hours: Monitor safety, psychosocial status, comfort, nutrition and hydration  Taken 1/4/2023 2000  Remains free of injury from restraints (restraint for interference with medical device): Every 2 hours: Monitor safety, psychosocial status, comfort, nutrition and hydration

## 2023-01-05 NOTE — PLAN OF CARE
Problem: Respiratory - Adult  Goal: Achieves optimal ventilation and oxygenation  Outcome: Progressing  Flowsheets  Taken 1/5/2023 0616 by Jennie Meredith RCP  Achieves optimal ventilation and oxygenation:   Assess for changes in respiratory status   Position to facilitate oxygenation and minimize respiratory effort   Respiratory therapy support as indicated   Assess for changes in mentation and behavior   Oxygen supplementation based on oxygen saturation or arterial blood gases   Encourage broncho-pulmonary hygiene including cough, deep breathe, incentive spirometry   Assess and instruct to report shortness of breath or any respiratory difficulty  Taken 1/4/2023 2000 by Katherin Rubin RN  Achieves optimal ventilation and oxygenation: Assess for changes in respiratory status

## 2023-01-05 NOTE — PLAN OF CARE
Problem: Respiratory - Adult  Goal: Achieves optimal ventilation and oxygenation  1/5/2023 1217 by Lion Mosher RCP  Outcome: Progressing  Flowsheets (Taken 1/5/2023 1217)  Achieves optimal ventilation and oxygenation:   Assess for changes in respiratory status   Assess for changes in mentation and behavior   Position to facilitate oxygenation and minimize respiratory effort   Oxygen supplementation based on oxygen saturation or arterial blood gases  T

## 2023-01-05 NOTE — PROCEDURES
Estella Aguilar is a 47 y.o. female patient. 1. Hypoxia    2. Altered mental status, unspecified altered mental status type    3. Pneumonia of both lungs due to infectious organism, unspecified part of lung      Past Medical History:   Diagnosis Date    Acute respiratory failure with hypoxia ([address] 10/16/2020    Alcohol withdrawal syndrome, with delirium ([address] 12/14/2019    Alcoholism ([facility]     Anal warts     Anemia 10/2020    GI bleed    Anxiety     Astrocytoma ([facility] - diagnosed at age 24, the patient underwent 2 surgical resections without known recurrence 10/23/2020    at age 24   [de-identified] Bandemia     Closed fracture of lateral portion of left tibial plateau 86/15/7916    Condyloma     COPD (chronic obstructive pulmonary disease) ([facility]     CO2 retainer, on Bipap at night for this, Dr. Paige Doll ( last visit 11/20/2020 and note on chart )    Depression      major depressive disorder, ptsd, anxiety    Dysphagia     GI bleed 10/2020    Hypertension     Memory loss     Oxygen dependent     USES  3L/MIN DAILY PRN AND NIGHTLY CONTINUOUSLY    Pain, joint, ankle and foot     Pancreatic lesion 10/2020    Dr. Alyce Richardson working up pt    Perianal wart     Peripheral neuropathy     Seizures ([facility]     LAST SEIZURE 3/2020 - FEELS IT WAS FROM ALCOHOL WITHDRAWL    Tension headache     Under care of team 09/29/2021    neuro-Dr Coyle-[facility]-last visit sep 2021    Under care of team 09/29/2021    pain management-Hamzah jimenez-last visit sep 2021    Under care of team     pulmonology-Dr Dueñas[facility]-due for visit March 2022    Under care of team 09/29/2021    psych-bahnfeldt NP-telemed-last visit 10/ 2021    Under care of team 09/29/2021    pv[facility]-last visit aug 2021    Under care of team     NEPHROLOGY - DR. LEE    Wears glasses     Wears partial dentures     UPPER    Wellness examination     pcp-Ludivina grimes-last visit Jan 5, 2022     Blood pressure 105/65, pulse 70, temperature 99.7 °F (37.6 °C), temperature source Bladder, resp. rate 19, height 5' 6\" (1.676 m), weight 141 lb 8.6 oz (64.2 kg), SpO2 (!) 87 %. Intubation    Date/Time: 1/4/2023 9:08 PM  Performed by: Chloe Orozco MD  Authorized by: Chloe Orozco MD   Consent: The procedure was performed in an emergent situation. Verbal consent obtained. Risks and benefits: risks, benefits and alternatives were discussed  Consent given by: parent  Patient understanding: patient states understanding of the procedure being performed  Patient consent: the patient's understanding of the procedure matches consent given  Procedure consent: procedure consent matches procedure scheduled  Relevant documents: relevant documents present and verified  Test results: test results available and properly labeled  Site marked: the operative site was marked  Imaging studies: imaging studies available  Required items: required blood products, implants, devices, and special equipment available  Patient identity confirmed: arm band  Time out: Immediately prior to procedure a \"time out\" was called to verify the correct patient, procedure, equipment, support staff and site/side marked as required. Indications: hypoxemia and respiratory failure  Intubation method: video-assisted  Patient status: paralyzed (RSI)  Preoxygenation: BVM  Pretreatment medications: midazolam  Sedatives: etomidate  Paralytic: succinylcholine  Laryngoscope size: 4.   Tube size: 7.5 mm  Tube type: cuffed  Number of attempts: 1  Cricoid pressure: no  Cords visualized: yes  Post-procedure assessment: chest rise and CO2 detector  Breath sounds: equal  Cuff inflated: yes  ETT to lip: 22 cm  Tube secured with: ETT ponce  Chest x-ray interpreted by radiologist.  Chest x-ray findings: endotracheal tube in appropriate position  Patient tolerance: patient tolerated the procedure well with no immediate complications        Parris Rubio MD  1/4/2023

## 2023-01-06 LAB
ABSOLUTE EOS #: 0.04 K/UL (ref 0–0.44)
ABSOLUTE IMMATURE GRANULOCYTE: 0.05 K/UL (ref 0–0.3)
ABSOLUTE LYMPH #: 2.69 K/UL (ref 1.1–3.7)
ABSOLUTE MONO #: 0.83 K/UL (ref 0.1–1.2)
ALLEN TEST: POSITIVE
ANION GAP SERPL CALCULATED.3IONS-SCNC: 10 MMOL/L (ref 9–17)
BASOPHILS # BLD: 0 % (ref 0–2)
BASOPHILS ABSOLUTE: <0.03 K/UL (ref 0–0.2)
BUN BLDV-MCNC: 23 MG/DL (ref 6–20)
CALCIUM SERPL-MCNC: 8.9 MG/DL (ref 8.6–10.4)
CHLORIDE BLD-SCNC: 102 MMOL/L (ref 98–107)
CO2: 28 MMOL/L (ref 20–31)
CREAT SERPL-MCNC: 0.26 MG/DL (ref 0.5–0.9)
CULTURE: NORMAL
CULTURE: NORMAL
EOSINOPHILS RELATIVE PERCENT: 0 % (ref 1–4)
FIO2: 50
GFR SERPL CREATININE-BSD FRML MDRD: >60 ML/MIN/1.73M2
GLUCOSE BLD-MCNC: 152 MG/DL (ref 70–99)
GLUCOSE BLD-MCNC: 159 MG/DL (ref 74–100)
HCT VFR BLD CALC: 36 % (ref 36.3–47.1)
HEMOGLOBIN: 11.5 G/DL (ref 11.9–15.1)
IMMATURE GRANULOCYTES: 1 %
LYMPHOCYTES # BLD: 27 % (ref 24–43)
Lab: NORMAL
Lab: NORMAL
MAGNESIUM: 1.6 MG/DL (ref 1.6–2.6)
MCH RBC QN AUTO: 28.5 PG (ref 25.2–33.5)
MCHC RBC AUTO-ENTMCNC: 31.9 G/DL (ref 28.4–34.8)
MCV RBC AUTO: 89.1 FL (ref 82.6–102.9)
MONOCYTES # BLD: 8 % (ref 3–12)
NRBC AUTOMATED: 0 PER 100 WBC
O2 DEVICE/FLOW/%: ABNORMAL
PDW BLD-RTO: 15.4 % (ref 11.8–14.4)
PLATELET # BLD: 422 K/UL (ref 138–453)
PMV BLD AUTO: 10.5 FL (ref 8.1–13.5)
POC HCO3: 30.8 MMOL/L (ref 21–28)
POC O2 SATURATION: 98 % (ref 94–98)
POC PCO2: 48.6 MM HG (ref 35–48)
POC PH: 7.41 (ref 7.35–7.45)
POC PO2: 99.5 MM HG (ref 83–108)
POSITIVE BASE EXCESS, ART: 5 (ref 0–3)
POTASSIUM SERPL-SCNC: 3.8 MMOL/L (ref 3.7–5.3)
RBC # BLD: 4.04 M/UL (ref 3.95–5.11)
RBC # BLD: ABNORMAL 10*6/UL
SAMPLE SITE: ABNORMAL
SEG NEUTROPHILS: 63 % (ref 36–65)
SEGMENTED NEUTROPHILS ABSOLUTE COUNT: 6.2 K/UL (ref 1.5–8.1)
SODIUM BLD-SCNC: 140 MMOL/L (ref 135–144)
SPECIMEN DESCRIPTION: NORMAL
SPECIMEN DESCRIPTION: NORMAL
WBC # BLD: 9.8 K/UL (ref 3.5–11.3)

## 2023-01-06 PROCEDURE — 94761 N-INVAS EAR/PLS OXIMETRY MLT: CPT

## 2023-01-06 PROCEDURE — 80048 BASIC METABOLIC PNL TOTAL CA: CPT

## 2023-01-06 PROCEDURE — 36415 COLL VENOUS BLD VENIPUNCTURE: CPT

## 2023-01-06 PROCEDURE — 83735 ASSAY OF MAGNESIUM: CPT

## 2023-01-06 PROCEDURE — 2580000003 HC RX 258: Performed by: STUDENT IN AN ORGANIZED HEALTH CARE EDUCATION/TRAINING PROGRAM

## 2023-01-06 PROCEDURE — 94003 VENT MGMT INPAT SUBQ DAY: CPT

## 2023-01-06 PROCEDURE — 6360000002 HC RX W HCPCS: Performed by: NURSE PRACTITIONER

## 2023-01-06 PROCEDURE — 6370000000 HC RX 637 (ALT 250 FOR IP)

## 2023-01-06 PROCEDURE — 36600 WITHDRAWAL OF ARTERIAL BLOOD: CPT

## 2023-01-06 PROCEDURE — 82803 BLOOD GASES ANY COMBINATION: CPT

## 2023-01-06 PROCEDURE — 6370000000 HC RX 637 (ALT 250 FOR IP): Performed by: STUDENT IN AN ORGANIZED HEALTH CARE EDUCATION/TRAINING PROGRAM

## 2023-01-06 PROCEDURE — 2500000003 HC RX 250 WO HCPCS

## 2023-01-06 PROCEDURE — 6370000000 HC RX 637 (ALT 250 FOR IP): Performed by: NURSE PRACTITIONER

## 2023-01-06 PROCEDURE — 6360000002 HC RX W HCPCS: Performed by: STUDENT IN AN ORGANIZED HEALTH CARE EDUCATION/TRAINING PROGRAM

## 2023-01-06 PROCEDURE — 2000000000 HC ICU R&B

## 2023-01-06 PROCEDURE — 94640 AIRWAY INHALATION TREATMENT: CPT

## 2023-01-06 PROCEDURE — 85025 COMPLETE CBC W/AUTO DIFF WBC: CPT

## 2023-01-06 PROCEDURE — 82947 ASSAY GLUCOSE BLOOD QUANT: CPT

## 2023-01-06 PROCEDURE — 2700000000 HC OXYGEN THERAPY PER DAY

## 2023-01-06 PROCEDURE — 6370000000 HC RX 637 (ALT 250 FOR IP): Performed by: INTERNAL MEDICINE

## 2023-01-06 PROCEDURE — 2580000003 HC RX 258: Performed by: NURSE PRACTITIONER

## 2023-01-06 PROCEDURE — 99291 CRITICAL CARE FIRST HOUR: CPT | Performed by: INTERNAL MEDICINE

## 2023-01-06 PROCEDURE — 6360000002 HC RX W HCPCS: Performed by: INTERNAL MEDICINE

## 2023-01-06 RX ORDER — GLYCOPYRROLATE 0.2 MG/ML
0.1 INJECTION INTRAMUSCULAR; INTRAVENOUS 2 TIMES DAILY
Status: COMPLETED | OUTPATIENT
Start: 2023-01-06 | End: 2023-01-07

## 2023-01-06 RX ADMIN — SUCRALFATE 1 G: 1 TABLET ORAL at 12:01

## 2023-01-06 RX ADMIN — SODIUM CHLORIDE, PRESERVATIVE FREE 10 ML: 5 INJECTION INTRAVENOUS at 08:57

## 2023-01-06 RX ADMIN — SODIUM CHLORIDE, PRESERVATIVE FREE 10 ML: 5 INJECTION INTRAVENOUS at 21:35

## 2023-01-06 RX ADMIN — ALBUTEROL SULFATE 2.5 MG: 2.5 SOLUTION RESPIRATORY (INHALATION) at 11:43

## 2023-01-06 RX ADMIN — TOPIRAMATE 25 MG: 25 TABLET, FILM COATED ORAL at 21:35

## 2023-01-06 RX ADMIN — IPRATROPIUM BROMIDE AND ALBUTEROL SULFATE 1 AMPULE: .5; 3 SOLUTION RESPIRATORY (INHALATION) at 11:43

## 2023-01-06 RX ADMIN — METHYLPREDNISOLONE SODIUM SUCCINATE 20 MG: 40 INJECTION, POWDER, FOR SOLUTION INTRAMUSCULAR; INTRAVENOUS at 08:55

## 2023-01-06 RX ADMIN — PIPERACILLIN AND TAZOBACTAM 3375 MG: 3; .375 INJECTION, POWDER, FOR SOLUTION INTRAVENOUS at 16:20

## 2023-01-06 RX ADMIN — SODIUM CHLORIDE, PRESERVATIVE FREE 10 ML: 5 INJECTION INTRAVENOUS at 21:36

## 2023-01-06 RX ADMIN — GLYCOPYRROLATE 0.1 MG: 0.2 INJECTION INTRAMUSCULAR; INTRAVENOUS at 23:11

## 2023-01-06 RX ADMIN — METHYLPREDNISOLONE SODIUM SUCCINATE 20 MG: 40 INJECTION, POWDER, FOR SOLUTION INTRAMUSCULAR; INTRAVENOUS at 21:35

## 2023-01-06 RX ADMIN — ACETAMINOPHEN 650 MG: 325 TABLET ORAL at 23:10

## 2023-01-06 RX ADMIN — MIRTAZAPINE 15 MG: 15 TABLET, FILM COATED ORAL at 21:35

## 2023-01-06 RX ADMIN — BUSPIRONE HYDROCHLORIDE 30 MG: 15 TABLET ORAL at 08:55

## 2023-01-06 RX ADMIN — CARBAMAZEPINE 100 MG: 100 SUSPENSION ORAL at 21:35

## 2023-01-06 RX ADMIN — TOPIRAMATE 25 MG: 25 TABLET, FILM COATED ORAL at 10:55

## 2023-01-06 RX ADMIN — ENOXAPARIN SODIUM 40 MG: 100 INJECTION SUBCUTANEOUS at 08:55

## 2023-01-06 RX ADMIN — SUCRALFATE 1 G: 1 TABLET ORAL at 16:20

## 2023-01-06 RX ADMIN — IPRATROPIUM BROMIDE AND ALBUTEROL SULFATE 1 AMPULE: .5; 3 SOLUTION RESPIRATORY (INHALATION) at 20:54

## 2023-01-06 RX ADMIN — SUCRALFATE 1 G: 1 TABLET ORAL at 09:13

## 2023-01-06 RX ADMIN — IPRATROPIUM BROMIDE AND ALBUTEROL SULFATE 1 AMPULE: .5; 3 SOLUTION RESPIRATORY (INHALATION) at 15:52

## 2023-01-06 RX ADMIN — PANCRELIPASE LIPASE, PANCRELIPASE PROTEASE, PANCRELIPASE AMYLASE 40000 UNITS: 20000; 63000; 84000 CAPSULE, DELAYED RELEASE ORAL at 12:01

## 2023-01-06 RX ADMIN — METOPROLOL TARTRATE 12.5 MG: 25 TABLET ORAL at 09:20

## 2023-01-06 RX ADMIN — PIPERACILLIN AND TAZOBACTAM 3375 MG: 3; .375 INJECTION, POWDER, FOR SOLUTION INTRAVENOUS at 00:21

## 2023-01-06 RX ADMIN — ROPINIROLE HYDROCHLORIDE 1 MG: 1 TABLET, FILM COATED ORAL at 21:35

## 2023-01-06 RX ADMIN — POTASSIUM BICARBONATE 20 MEQ: 782 TABLET, EFFERVESCENT ORAL at 09:10

## 2023-01-06 RX ADMIN — METOPROLOL TARTRATE 12.5 MG: 25 TABLET ORAL at 21:36

## 2023-01-06 RX ADMIN — ATORVASTATIN CALCIUM 20 MG: 20 TABLET, FILM COATED ORAL at 08:55

## 2023-01-06 RX ADMIN — Medication 100 MG: at 08:56

## 2023-01-06 RX ADMIN — CARBAMAZEPINE 100 MG: 100 SUSPENSION ORAL at 08:55

## 2023-01-06 RX ADMIN — IPRATROPIUM BROMIDE AND ALBUTEROL SULFATE 1 AMPULE: .5; 3 SOLUTION RESPIRATORY (INHALATION) at 08:06

## 2023-01-06 RX ADMIN — PANCRELIPASE LIPASE, PANCRELIPASE PROTEASE, PANCRELIPASE AMYLASE 40000 UNITS: 20000; 63000; 84000 CAPSULE, DELAYED RELEASE ORAL at 16:20

## 2023-01-06 RX ADMIN — PANTOPRAZOLE SODIUM 40 MG: 40 TABLET, DELAYED RELEASE ORAL at 08:56

## 2023-01-06 RX ADMIN — SODIUM CHLORIDE: 9 INJECTION, SOLUTION INTRAVENOUS at 04:33

## 2023-01-06 RX ADMIN — PIPERACILLIN AND TAZOBACTAM 3375 MG: 3; .375 INJECTION, POWDER, FOR SOLUTION INTRAVENOUS at 10:22

## 2023-01-06 RX ADMIN — PREGABALIN 200 MG: 100 CAPSULE ORAL at 21:35

## 2023-01-06 RX ADMIN — PANCRELIPASE LIPASE, PANCRELIPASE PROTEASE, PANCRELIPASE AMYLASE 40000 UNITS: 20000; 63000; 84000 CAPSULE, DELAYED RELEASE ORAL at 08:55

## 2023-01-06 RX ADMIN — SUCRALFATE 1 G: 1 TABLET ORAL at 21:35

## 2023-01-06 RX ADMIN — BUSPIRONE HYDROCHLORIDE 30 MG: 15 TABLET ORAL at 21:35

## 2023-01-06 ASSESSMENT — PULMONARY FUNCTION TESTS
PIF_VALUE: 21
PIF_VALUE: 21
PIF_VALUE: 20
PIF_VALUE: 7
PIF_VALUE: 22
PIF_VALUE: 18
PIF_VALUE: 13

## 2023-01-06 NOTE — PLAN OF CARE
Problem: Respiratory - Adult  Goal: Achieves optimal ventilation and oxygenation  1/6/2023 1030 by Wandy Baez RCP  Outcome: Progressing  1/6/2023 0625 by Cary Sigala RN  Outcome: Progressing  Flowsheets (Taken 1/5/2023 2000)  Achieves optimal ventilation and oxygenation: Assess for changes in respiratory status

## 2023-01-06 NOTE — PROGRESS NOTES
Comprehensive Nutrition Assessment    Type and Reason for Visit:  Reassess    Nutrition Recommendations/Plan:   Continue Current Tube Feeding formula; increase rate slightly to 55 ml/hr. Consider stool softener? Will continue to monitor TF tolerance/adequacy. Malnutrition Assessment:  Malnutrition Status:  Insufficient data (01/02/23 1150)    Context:  Chronic Illness     Findings of the 6 clinical characteristics of malnutrition:  Energy Intake:  Unable to assess  Weight Loss:  No significant weight loss     Body Fat Loss:  No significant body fat loss     Muscle Mass Loss:  Unable to assess    Fluid Accumulation:  No significant fluid accumulation (per RN documentation)     Strength:  Not Performed    Nutrition Assessment:    Chart reviewed. Pt remains intubated. Peptide Based TF at 50 mL/hr and tolerating well per RN. No BM noted since admission. Meds include: Glycolax PRN. Nutrition Related Findings:    meds/labs reviewed Wound Type: None       Current Nutrition Intake & Therapies:    ADULT DIET; Regular; No Added Salt (3-4 gm)--NPO at present  ADULT TUBE FEEDING; Orogastric; Peptide Based; Continuous; 20; Yes; 10; Q 4 hours; 50; 30; Q 4 hours  Current Tube Feeding (TF) Orders:  Feeding Route: Orogastric  Formula: Peptide Based  Schedule: Continuous  Feeding Regimen: 50 mL/hr  Current TF & Flush Orders Provides: 50 mL/hr =1440 kcal and 90 g pro/day  Goal TF & Flush Orders Provides: 55 mL/hr =1584 kcal and 99 g pro/day  Additional Calorie Sources:  none    Anthropometric Measures:  Height: 5' 6\" (167.6 cm)  Ideal Body Weight (IBW): 130 lbs (59 kg)    Admission Body Weight: 141 lb 8.6 oz (64.2 kg)  Current Body Weight: 141 lb 8.6 oz (64.2 kg),   IBW. Weight Source: Bed Scale  Current BMI (kg/m2): 22.9  Usual Body Weight: 152 lb (68.9 kg) (8/9/22 per EHR)  % Weight Change (Calculated): -6.9                    BMI Categories: Normal Weight (BMI 18.5-24. 9)    Estimated Daily Nutrient Needs:  Energy Requirements Based On: Kcal/kg  Weight Used for Energy Requirements: Current  Energy (kcal/day): 1600 kcal/day  Weight Used for Protein Requirements: Current  Protein (g/day): 80 g pro/day  Method Used for Fluid Requirements: ml/Kg (25-28 mL/kg)  Fluid (ml/day): 5792-0859 mL/day    Nutrition Diagnosis:   Inadequate oral intake related to impaired respiratory function as evidenced by NPO or clear liquid status due to medical condition, need for enteral nutrition support    Nutrition Interventions:   Food and/or Nutrient Delivery: Continue Current Tube Feeding formula; increase rate slightly to 55 ml/hr. Consider stool softener? Nutrition Education/Counseling: No recommendation at this time  Coordination of Nutrition Care: Continue to monitor while inpatient  Plan of Care discussed with: RN    Goals:  Previous Goal Met: Goal(s) Achieved  Goals: Meet at least 75% of estimated needs, within 7 days       Nutrition Monitoring and Evaluation:   Behavioral-Environmental Outcomes: None Identified  Food/Nutrient Intake Outcomes: Enteral Nutrition Intake/Tolerance  Physical Signs/Symptoms Outcomes: Biochemical Data, Fluid Status or Edema, Skin, Weight, Nutrition Focused Physical Findings    Discharge Planning:     Too soon to determine     [address], LD  Contact: [phone]

## 2023-01-06 NOTE — PLAN OF CARE
Problem: Safety - Medical Restraint  Goal: Remains free of injury from restraints (Restraint for Interference with Medical Device)  Description: INTERVENTIONS:  1. Determine that other, less restrictive measures have been tried or would not be effective before applying the restraint  2. Evaluate the patient's condition at the time of restraint application  3. Inform patient/family regarding the reason for restraint  4.  Q2H: Monitor safety, psychosocial status, comfort, nutrition and hydration  1/6/2023 1449 by Mehul Garcia RN  Outcome: Completed  Flowsheets (Taken 1/6/2023 0800)  Remains free of injury from restraints (restraint for interference with medical device): Every 2 hours: Monitor safety, psychosocial status, comfort, nutrition and hydration

## 2023-01-06 NOTE — PROGRESS NOTES
Occupational 3200 appening  Occupational Therapy Not Seen Note    DATE: 2023    NAME: Syed Baker  MRN: [id number]   : 1970      Patient not seen this date for Occupational Therapy due to:    Pt attempted twice this date unsuccessfully for OT Eval.  AM Attempt, Pt declined (d/t fatigue and lethargy). PM Attempt, Pt sleeping soundly, RN requested not to awaken Pt. OT will re-attempt at later date, plan to recheck .       Electronically signed by FLORIN Prather OTR/L on 2023 at 1:40 PM

## 2023-01-06 NOTE — CARE COORDINATION
Transitional planning. Intubated/ Sedated FiO2 40%, PEEP of 5, OG for TF. PT/OT ordered. Monitor for HC/SNF needs. Previously at [address] [place] [address] and liked it.

## 2023-01-06 NOTE — PLAN OF CARE
Problem: Discharge Planning  Goal: Discharge to home or other facility with appropriate resources  Outcome: Progressing  Flowsheets (Taken 1/5/2023 2000)  Discharge to home or other facility with appropriate resources: Identify barriers to discharge with patient and caregiver     Problem: Pain  Goal: Verbalizes/displays adequate comfort level or baseline comfort level  Outcome: Progressing  Flowsheets  Taken 1/6/2023 0000  Verbalizes/displays adequate comfort level or baseline comfort level: Assess pain using appropriate pain scale  Taken 1/5/2023 2000  Verbalizes/displays adequate comfort level or baseline comfort level: Assess pain using appropriate pain scale     Problem: Skin/Tissue Integrity  Goal: Absence of new skin breakdown  Description: 1. Monitor for areas of redness and/or skin breakdown  2. Assess vascular access sites hourly  3. Every 4-6 hours minimum:  Change oxygen saturation probe site  4. Every 4-6 hours:  If on nasal continuous positive airway pressure, respiratory therapy assess nares and determine need for appliance change or resting period. Outcome: Progressing     Problem: Nutrition Deficit:  Goal: Optimize nutritional status  Outcome: Progressing     Problem: Respiratory - Adult  Goal: Achieves optimal ventilation and oxygenation  Outcome: Progressing  Flowsheets (Taken 1/5/2023 2000)  Achieves optimal ventilation and oxygenation: Assess for changes in respiratory status     Problem: Confusion  Goal: Confusion, delirium, dementia, or psychosis is improved or at baseline  Description: INTERVENTIONS:  1. Assess for possible contributors to thought disturbance, including medications, impaired vision or hearing, underlying metabolic abnormalities, dehydration, psychiatric diagnoses, and notify attending LIP  2. [place] high risk fall precautions, as indicated  3. Provide frequent short contacts to provide reality reorientation, refocusing and direction  4.  Decrease environmental stimuli, including noise as appropriate  5. Monitor and intervene to maintain adequate nutrition, hydration, elimination, sleep and activity  6. If unable to ensure safety without constant attention obtain sitter and review sitter guidelines with assigned personnel  7. Initiate Psychosocial CNS and Spiritual Care consult, as indicated  Outcome: Progressing     Problem: Safety - Adult  Goal: Free from fall injury  Outcome: Progressing  Flowsheets (Taken 1/6/2023 0343)  Free From Fall Injury: Instruct family/caregiver on patient safety     Problem: ABCDS Injury Assessment  Goal: Absence of physical injury  Outcome: Progressing  Flowsheets (Taken 1/6/2023 0343)  Absence of Physical Injury: Implement safety measures based on patient assessment     Problem: Safety - Medical Restraint  Goal: Remains free of injury from restraints (Restraint for Interference with Medical Device)  Description: INTERVENTIONS:  1. Determine that other, less restrictive measures have been tried or would not be effective before applying the restraint  2. Evaluate the patient's condition at the time of restraint application  3. Inform patient/family regarding the reason for restraint  4.  Q2H: Monitor safety, psychosocial status, comfort, nutrition and hydration  Outcome: Progressing  Flowsheets  Taken 1/6/2023 0600  Remains free of injury from restraints (restraint for interference with medical device): Every 2 hours: Monitor safety, psychosocial status, comfort, nutrition and hydration  Taken 1/6/2023 0400  Remains free of injury from restraints (restraint for interference with medical device): Every 2 hours: Monitor safety, psychosocial status, comfort, nutrition and hydration  Taken 1/6/2023 0019  Remains free of injury from restraints (restraint for interference with medical device): Every 2 hours: Monitor safety, psychosocial status, comfort, nutrition and hydration  Taken 1/6/2023 0000  Remains free of injury from restraints (restraint for interference with medical device): Every 2 hours: Monitor safety, psychosocial status, comfort, nutrition and hydration  Taken 1/5/2023 2000  Remains free of injury from restraints (restraint for interference with medical device): Every 2 hours: Monitor safety, psychosocial status, comfort, nutrition and hydration

## 2023-01-06 NOTE — PROGRESS NOTES
INTENSIVE CARE UNIT  Resident Physician Progress Note    Patient - Claudetta Speed  Date of Admission -  2023 12:55 PM  Date of Evaluation -  2023  Room and Bed Number -  4053/9450-76   Hospital Day - 5    Chief Complaint   Patient presents with    Fatigue    Fall    Respiratory Distress      SUBJECTIVE:     OVERNIGHT EVENTS: No acute event overnight. TODAY: Although patient is intubated and ventilated, she is following commands . We are giving her sedation holiday and currently she is CPAP . Tolerating CPAP and sedation holiday very well. Vitally she is stable. Maintaining a MAP without any pressor support. She has a Nicholas in. And the output was 1.7 L. Patient still complains of left upper quadrant pain. Significant event on 2023:  Patient have CPAP for 4 hours. Tolerated sedation holiday for the same duration of time. We will start Robinul one-time IV 0.1 mg tonight and tomorrow morning. We will hold the pleural fluid tomorrow morning and will start cefepime for possible extubation tomorrow.     AWAKE & FOLLOWING COMMANDS:  [] No   [x] Yes    SECRETIONS Amount:  [x] Small [] Moderate  [] Large  [] None  Color:     [] White [] Colored  [] Bloody    SEDATION:  RAAS Score:  [] Propofol gtt  [] Versed gtt  [] Ativan gtt   [x] No Sedation    PARALYZED:  [x] No    [] Yes    VASOPRESSORS:  [x] No    [] Yes  [] Levophed [] Dopamine [] Vasopressin  [] Dobutamine [] Phenylephrine [] Epinephrine      OBJECTIVE:     VITAL SIGNS:  BP 99/70   Pulse 61   Temp 98.2 °F (36.8 °C)   Resp 18   Ht 5' 6\" (1.676 m)   Wt 141 lb 8.6 oz (64.2 kg)   SpO2 94%   BMI 22.84 kg/m²   Tmax over 24 hours:  Temp (24hrs), Av.6 °F (37 °C), Min:98.1 °F (36.7 °C), Max:99.1 °F (37.3 °C)      Patient Vitals for the past 8 hrs:   BP Temp Temp src Pulse Resp SpO2   23 0700 -- -- -- -- -- 94 %   23 0600 99/70 98.2 °F (36.8 °C) -- 61 18 96 %   23 0500 91/65 -- -- 62 18 91 %   23 0430 124/85 -- -- 58 18 100 %   01/06/23 0400 102/76 98.2 °F (36.8 °C) Bladder 56 18 97 %   01/06/23 0345 -- -- -- 56 18 97 %   01/06/23 0330 101/72 -- -- 56 18 95 %   01/06/23 0300 108/70 -- -- 57 18 98 %   01/06/23 0230 88/61 -- -- 58 18 97 %   01/06/23 0200 (!) 77/53 98.1 °F (36.7 °C) Bladder 63 18 93 %   01/06/23 0130 94/65 -- -- 62 18 91 %   01/06/23 0100 91/61 -- -- 64 18 91 %   01/06/23 0043 -- -- -- 65 20 94 %   01/06/23 0030 97/64 -- -- 67 18 92 %         Intake/Output Summary (Last 24 hours) at 1/6/2023 0810  Last data filed at 1/6/2023 0600  Gross per 24 hour   Intake 2381.16 ml   Output 830 ml   Net 1551.16 ml     Date 01/06/23 0000 - 01/06/23 2359   Shift 8607-8912 1453-9253 5967-6458 24 Hour Total   INTAKE   I.V.(mL/kg) 1053. 4(16.4)   1053. 4(16.4)   NG/GT(mL/kg) 403(6.3)   403(6.3)   IV Piggyback(mL/kg) 49.7(0.8)   49.7(0.8)   Shift Total(mL/kg) 1506.1(23.5)   1506.1(23.5)   OUTPUT   Urine(mL/kg/hr) 205(0.4)   205   Shift Total(mL/kg) 205(3.2)   205(3.2)   Weight (kg) 64.2 64.2 64.2 64.2     Wt Readings from Last 3 Encounters:   01/01/23 141 lb 8.6 oz (64.2 kg)   11/01/22 152 lb (68.9 kg)   09/01/22 146 lb 9.6 oz (66.5 kg)     Body mass index is 22.84 kg/m². PHYSICAL EXAM:  GEN:  Intubated, but still alert and oriented and responsive to command  EYES:  Lids and lashes normal, pupils equal, round and reactive to light, extra ocular muscles intact, sclera clear, conjunctiva normal  HEENT:  Normocephalic, without obvious abnormality, atramatic, sinuses nontender on palpation, external ears without lesions, oral pharynx with moist mucus membranes, tonsils without erythema or exudates, gums normal and good dentition.   LUNGS:  No increased work of breathing, good air exchange, clear to auscultation bilaterally, no crackles or wheezing  CV:    Normal apical impulse, regular rate and rhythm, normal S1 and S2, no S3 or S4, and no murmur noted  ABDOMEN:   No scars, normal bowel sounds, soft, non-distended, non-tender, no masses palpated, no hepatosplenomegaly    EXTREMITIES:  No pedal or leg edema, no calf tenderness/swelling, no erythema, distal pulses intact       MEDICATIONS:  Scheduled Meds:   potassium bicarb-citric acid  20 mEq Oral Once    metoprolol tartrate  12.5 mg Oral BID    carBAMazepine  100 mg Oral BID    methylPREDNISolone  20 mg IntraVENous Q12H    topiramate  25 mg Oral BID    ipratropium-albuterol  1 ampule Inhalation Q4H WA    piperacillin-tazobactam  3,375 mg IntraVENous Q8H    atorvastatin  20 mg Oral Daily    busPIRone  30 mg Oral BID    Pancrelipase (Lip-Prot-Amyl)  40,000 Units Oral TID WC    mirtazapine  15 mg Oral Nightly    pantoprazole  40 mg Oral QAM AC    pregabalin  200 mg Oral Nightly    rOPINIRole  1 mg Oral Nightly    sucralfate  1 g Oral 4x Daily    sodium chloride flush  5-40 mL IntraVENous 2 times per day    enoxaparin  40 mg SubCUTAneous Daily    sodium chloride flush  5-40 mL IntraVENous 2 times per day    thiamine  100 mg Oral Daily    magnesium sulfate  2,000 mg IntraVENous Once     Continuous Infusions:   propofol Stopped (01/05/23 0709)    fentaNYL 50 mcg/mL 50 mcg/hr (01/06/23 0459)    midazolam 3 mg/hr (01/06/23 0459)    sodium chloride 10 mL/hr at 01/06/23 0422    dexmedetomidine Stopped (01/04/23 1426)    sodium chloride 10 mL/hr at 01/06/23 0459     PRN Meds:   lidocaine viscous hcl, 15 mL, Q3H PRN  fentanNYL, 50 mcg, Q2H PRN  albuterol, 2.5 mg, Q4H PRN  hydrOXYzine HCl, 50 mg, TID PRN  sodium chloride flush, 10 mL, PRN  sodium chloride, , PRN  polyethylene glycol, 17 g, Daily PRN  acetaminophen, 650 mg, Q6H PRN   Or  acetaminophen, 650 mg, Q6H PRN  potassium chloride, 40 mEq, PRN   Or  potassium alternative oral replacement, 40 mEq, PRN   Or  potassium chloride, 10 mEq, PRN  magnesium sulfate, 2,000 mg, PRN  sodium chloride flush, 5-40 mL, PRN  sodium chloride, , PRN        SUPPORT DEVICES: [x] Ventilator [] BIPAP  [] Nasal Cannula [] Room Air    VENT SETTINGS (Comprehensive) (if applicable):   PRVC mode, FiO2 40%, PEEP 5, Respiratory Rate 18, Tidal Volume 470  Vent Information  Ventilator ID: [id number]  Equipment Changed: HME, Expiratory Filter  Ventilator Initiate: Yes  Additional Respiratory Assessments  Heart Rate: 61  Resp: 18  SpO2: 94 %  End Tidal CO2: 33 (%)  Position: Semi-Enriquez's  Humidification Source: HME  Humidification Temp: 33    ABGs:   Arterial Blood Gas result:  pH 7.4; pCO2 48.6; pO2 99.5; HCO3 ; BE 5;.  Lab Results   Component Value Date/Time    PMH0GUX NOT REPORTED 07/26/2021 03:38 PM    FIO2 50.0 01/06/2023 04:29 AM         DATA:  Complete Blood Count:   Recent Labs     01/04/23 0705   WBC 6.3   RBC 4.08   HGB 11.8*   HCT 36.1*   MCV 88.5   MCH 28.9   MCHC 32.7   RDW 15.5*      MPV 10.3        Last 3 Blood Glucose:   Recent Labs     01/04/23  0705 01/05/23  0625 01/06/23  0443   GLUCOSE 114* 116* 152*        PT/INR:    Lab Results   Component Value Date/Time    PROTIME 11.1 01/01/2023 01:12 PM    INR 1.0 01/01/2023 01:12 PM     PTT:    Lab Results   Component Value Date/Time    APTT 25.8 01/01/2023 01:12 PM       Comprehensive Metabolic Profile:   Recent Labs     01/04/23  0705 01/05/23  0625 01/05/23  1607 01/06/23 0443    141  --  140   K 3.8 3.5* 3.9 3.8    101  --  102   CO2 28 29  --  28   BUN 23* 23*  --  23*   CREATININE 0.46* 0.33*  --  0.26*   GLUCOSE 114* 116*  --  152*   CALCIUM 7.2* 7.4*  --  8.9   PROT  --  6.5  --   --    LABALBU  --  3.1*  --   --    BILITOT  --  0.4  --   --    ALKPHOS  --  157*  --   --    AST  --  33*  --   --    ALT  --  30  --   --       Magnesium:   Lab Results   Component Value Date/Time    MG 1.6 01/06/2023 04:43 AM    MG 1.7 01/05/2023 06:25 AM    MG 1.8 01/04/2023 07:05 AM     Phosphorus:   Lab Results   Component Value Date/Time    PHOS 3.7 07/23/2021 06:29 AM    PHOS 2.5 10/27/2019 08:33 PM    PHOS 4.0 07/14/2019 05:29 AM     Ionized Calcium:   Lab Results   Component Value Date/Time    CAION 0.94 01/01/2023 08:21 PM    CAION 0.93 01/01/2023 03:05 PM    CAION 1.30 07/22/2021 03:22 AM        Urinalysis:   Lab Results   Component Value Date/Time    NITRU NEGATIVE 01/01/2023 01:54 PM    COLORU DARK YELLOW 01/01/2023 01:54 PM    PHUR 5.5 01/01/2023 01:54 PM    WBCUA 2 TO 5 01/01/2023 01:54 PM    RBCUA 0 TO 2 01/01/2023 01:54 PM    MUCUS 2+ 09/01/2022 11:50 AM    TRICHOMONAS NOT REPORTED 10/12/2021 04:15 PM    YEAST MANY 01/01/2023 01:54 PM    BACTERIA MODERATE 01/01/2023 01:54 PM    CLARITYU cloudy 12/18/2018 03:46 PM    SPECGRAV 1.020 01/01/2023 01:54 PM    LEUKOCYTESUR SMALL 01/01/2023 01:54 PM    UROBILINOGEN ELEVATED 01/01/2023 01:54 PM    BILIRUBINUR NEGATIVE  Verified by ictotest. 01/01/2023 01:54 PM    BILIRUBINUR moderate 12/18/2018 03:46 PM    BLOODU neg 12/18/2018 03:46 PM    GLUCOSEU NEGATIVE 01/01/2023 01:54 PM    KETUA SMALL 01/01/2023 01:54 PM    AMORPHOUS 2+ 09/01/2022 11:50 AM       HgBA1c:    Lab Results   Component Value Date/Time    LABA1C 5.5 04/13/2021 11:34 AM     TSH:    Lab Results   Component Value Date/Time    TSH 1.40 01/02/2023 04:03 AM     Lactic Acid: No results found for: LACTA   Troponin: No results for input(s): TROPONINI in the last 72 hours.         ASSESSMENT:     Patient Active Problem List    Diagnosis Date Noted    Acute on chronic respiratory failure with hypoxia ([facility] 01/01/2023    Intractable migraine without aura and without status migrainosus 05/24/2022    Sleep disturbance 05/04/2022    Moderate episode of recurrent major depressive disorder ([facility] 12/14/2021    Adenomatous polyp of sigmoid colon     Hyponatremia 10/12/2021    Iron deficiency anemia 10/12/2021    Sepsis ([facility] 08/09/2021    Acute recurrent pancreatitis 08/09/2021    Atelectasis of left lung 08/09/2021    Fluid collection of pancreas     Diverticulitis of large intestine without perforation or abscess with bleeding     Pseudocyst of pancreas     Alcohol-induced acute pancreatitis 07/31/2021    Community acquired pneumonia of left lower lobe of lung 07/29/2021    Septicemia ([address]     Metabolic acidosis with increased anion gap and accumulation of organic acids 07/28/2021    Compression fracture of thoracic vertebra [facility])     Pathological compression fracture of lumbar vertebra with delayed healing     Closed fracture of fifth thoracic vertebra ([address] 07/22/2021    Diverticulitis of large intestine with abscess without bleeding 07/22/2021    Elevated alkaline phosphatase level 07/22/2021    Chronic pancreatitis ([address] 07/22/2021    Erythema ab igne 07/22/2021    Severe sepsis ([address] 07/21/2021    Multilevel spine pain 06/30/2021    Chronic pain syndrome 06/30/2021    Marijuana abuse 06/30/2021    Chronic peripheral neuropathic pain 05/04/2021    History of alcohol abuse 05/04/2021    History of petit-mal seizures 05/04/2021    Intractable episodic tension-type headache 05/04/2021    Personal history of astrocytoma 05/04/2021    Esophageal dysphagia     Major depressive disorder, recurrent severe without psychotic features ([address] 03/31/2021    AMAN (generalized anxiety disorder) 03/22/2021    Perineal mass, female 10/23/2020    Esophagitis candidal  10/23/2020    Condyloma 10/23/2020    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24, the patient underwent 2 surgical resections without known recurrence 10/23/2020    Alcohol use disorder, severe, in early remission ([address] 60/82/0468    Metabolic encephalopathy     Recurrent major depressive disorder ([address] 10/20/2020    Elevated CA 19-9 level 10/20/2020    Pancreatic cyst 10/19/2020    Acute respiratory failure with hypoxia ([address] 10/16/2020    Paresthesia of lower extremity 10/13/2020    COPD with acute exacerbation ([address] 10/12/2020    Seizure disorder ([address]     Ambulatory dysfunction 12/17/2019    Tobacco use     Hypomagnesemia 43/29/8377    Alcoholic peripheral neuropathy ([address] 12/14/2019    Hypokalemia 12/14/2019    Macrocytic anemia 12/14/2019    Psychophysiological insomnia 06/05/2019 Anxiety 06/05/2019    Essential hypertension 08/13/2015    Nicotine addiction 08/13/2015    Reflex sympathetic dystrophy of lower limb 03/27/2014    Acute bronchitis 10/02/2012          PLAN:     WEAN PER PROTOCOL:  [] No   [x] Yes  [] N/A    ICU PROPHYLAXIS:  Stress ulcer:  [x] PPI Agent  [] [id number] [] Sucralfate  [] Other:  VTE:   [x] Enoxaparin  [] Unfract. Heparin Subcut  [] EPC Cuffs    NUTRITION:  [] NPO [x] Tube Feeding (Specify: ) [] TPN  [] PO    HOME MEDS RECONCILED: [] No  [x] Yes    CONSULTATION NEEDED:  [] No  [x] Yes    FAMILY UPDATED:    [] No  [x] Yes    TRANSFER OUT OF ICU:   [x] No  [] Yes    Plan:  Neuro  Metabolic encephalopathy  Hx alcohol abuse, hx chronic pancreatitis  Precedex gtt  Hx anxiety, on atarax at home  Hx COPD, on 4 L NC at home, has not been compliant  Seizure precautions  CIWA protocol  Buspar 30 BID  Carbamazepine 200 TID, Topamax 50 BID  Remeron 15  Pregabalin 200  Requip 1 mg  Patient is intubated, sedated, currently on ventilato     Neurochecks per protocol      Cardiology  Cardiology consulted  EKG 1/1 with T wave inversions laterally  Avoid Qtc prolonging medications  Hx seizure disorder on carbamazepine  Lipitor  Coreg 3.125 BID  Lisinopril 5 daily  Echo showed hyperdynamic circulation, Lasix discontinued     Pulmonary  Intubated, sedated currently on ventilator with above-mentioned vent setting  DuoNeb  Symbicort  Monitor respiratory status  ABG PRN     Renal  Monitor intake output  Cr 0.54     GI  Tube feeding, orogastric peptide-based  Hx alcohol abuse with chronic pancreatitis  Ammonia 69  Lactulose 20 g TID  Lipase-protease-amylase 30,000 TID  Pancrelipase 40,000 TID  CT abdomen showing N/V mid. Patient.   Pericholecystic fluid, ultrasonogram right upper quadrant and gallbladder showed a cystlike etiology alcoholic  Protonix 40   Carafate 1 g QID  Thiamine  Alk phos elevated AST to ALT ratio >2 indicating alcoholic cirrhosis    ID  Zosyn  Given vanco and zosyn in ED  WBC 6.3    Endo  Glucose 121          Lizzy Jimenez MD  Internal Medicine Resident,PGY [address]   Attending Physician Statement  I have discussed the care of Richi Quinn, including pertinent history and exam findings,  with the resident. I have seen and examined the patient and the key elements of all parts of the encounter have been performed by me. I agree with the assessment, plan and orders as documented by the resident with additions . Excessive oral sec and et sec . Cont zosyn   Glycopyrrolate 2 -3 doses and extubate in am        Total critical care time caring for this patient with life threatening, unstable organ failure, including direct patient contact, management of life support systems, review of data including imaging and labs, discussions with other team members and physicians at least 27   Min so far today, excluding procedures. Electronically signed by Mindi Mishra MD on   1/6/23 at 10:04 PM EST    Please note that this chart was generated using voice recognition Dragon dictation software. Although every effort was made to ensure the accuracy of this automated transcription, some errors in transcription may have occurred.

## 2023-01-06 NOTE — PROGRESS NOTES
[facility]   Progress Note                   Date:   1/6/2023  Patient name: Fly Selby  Date of admission:  1/1/2023 12:55 PM  MRN:   [id number]  YOB: 1970  PCP: Gibran Gramajo MD    Reason for Admission: Acute hypoxic respiratory failure    Subjective:        There were no acute events overnight, remained hemodynamically stable  Has remained afebrile  Few episodes of sinus tachycardia  Blood pressure stable on the softer side not requiring any pressors  Continues to remain intubated and sedated, follows simple commands  Labs this morning fairly normal  Telemetry showing sinus rhythm with heart rate in 60s  On antibiotics for pneumonia    Medications:   Scheduled Meds:   metoprolol succinate  12.5 mg Oral Daily    carBAMazepine  100 mg Oral BID    methylPREDNISolone  20 mg IntraVENous Q12H    topiramate  25 mg Oral BID    ipratropium-albuterol  1 ampule Inhalation Q4H WA    piperacillin-tazobactam  3,375 mg IntraVENous Q8H    atorvastatin  20 mg Oral Daily    busPIRone  30 mg Oral BID    Pancrelipase (Lip-Prot-Amyl)  40,000 Units Oral TID WC    mirtazapine  15 mg Oral Nightly    pantoprazole  40 mg Oral QAM AC    pregabalin  200 mg Oral Nightly    rOPINIRole  1 mg Oral Nightly    sucralfate  1 g Oral 4x Daily    sodium chloride flush  5-40 mL IntraVENous 2 times per day    enoxaparin  40 mg SubCUTAneous Daily    sodium chloride flush  5-40 mL IntraVENous 2 times per day    thiamine  100 mg Oral Daily    magnesium sulfate  2,000 mg IntraVENous Once       Continuous Infusions:   propofol Stopped (01/05/23 0709)    fentaNYL 50 mcg/mL 50 mcg/hr (01/06/23 0459)    midazolam 3 mg/hr (01/06/23 0459)    sodium chloride 10 mL/hr at 01/06/23 0422    dexmedetomidine Stopped (01/04/23 1426)    sodium chloride 10 mL/hr at 01/06/23 0459       CBC:   Recent Labs     01/04/23  0705   WBC 6.3   HGB 11.8*        BMP:    Recent Labs     01/04/23  0705 01/05/23  0625 01/05/23  1607 01/06/23  0443    141  --  140   K 3.8 3.5* 3.9 3.8    101  --  102   CO2 28 29  --  28   BUN 23* 23*  --  23*   CREATININE 0.46* 0.33*  --  0.26*   GLUCOSE 114* 116*  --  152*     Hepatic:   Recent Labs     01/05/23  0625   AST 33*   ALT 30   BILITOT 0.4   ALKPHOS 157*     Troponin: No results for input(s): TROPONINI in the last 72 hours. BNP: No results for input(s): BNP in the last 72 hours. Lipids: No results for input(s): CHOL, HDL in the last 72 hours. Invalid input(s): LDLCALCU  INR: No results for input(s): INR in the last 72 hours. Objective:   Vitals: BP 99/70   Pulse 61   Temp 98.2 °F (36.8 °C)   Resp 18   Ht 5' 6\" (1.676 m)   Wt 141 lb 8.6 oz (64.2 kg)   SpO2 96%   BMI 22.84 kg/m²     General appearance: Awake and follows simple commands  HEENT: Head: Normocephalic, no lesions, without obvious abnormality  Neck: no JVD  Lungs: clear to auscultation bilaterally,  Heart: Normal heart sounds  Abdomen: soft, non-tender; bowel sounds normal  Extremities: No LE edema  Neurologic: Alert and follows simple commands      EKG  1/2/23 sinus rhythm, first-degree AV block, Normal axis, T wave inversion in anterior leads, prolonged QTc     ECHO  01/01/23  Summary  Left ventricle is normal in size. Global left ventricular systolic function  is hyperdynamic. Calculated ejection fraction >65% by Guadarrama's method. Increased velocities in distal LV. Normal right ventricular size and function. No significant valvular regurgitation or stenosis seen.      Coronary angiography: Not done        Assessment:     Patient Active Problem List:     Acute bronchitis     Reflex sympathetic dystrophy of lower limb     Essential hypertension     Nicotine addiction     Psychophysiological insomnia     Anxiety     Alcoholic peripheral neuropathy (HCC)     Hypokalemia     Macrocytic anemia     Hypomagnesemia     Tobacco use     Ambulatory dysfunction     Seizure disorder (HCC)     COPD with acute exacerbation ([address]     Paresthesia of lower extremity     Acute respiratory failure with hypoxia (HCC)     Pancreatic cyst     Recurrent major depressive disorder (HCC)     Elevated CA 19-9 level     Perineal mass, female     Esophagitis candidal      Condyloma     Astrocytoma (HCC) - diagnosed at age 24, the patient underwent 2 surgical resections without known recurrence     Alcohol use disorder, severe, in early remission ([address]     Metabolic encephalopathy     AMAN (generalized anxiety disorder)     Major depressive disorder, recurrent severe without psychotic features ([address]     Esophageal dysphagia     Chronic peripheral neuropathic pain     History of alcohol abuse     History of petit-mal seizures     Intractable episodic tension-type headache     Personal history of astrocytoma     Multilevel spine pain     Chronic pain syndrome     Marijuana abuse     Severe sepsis (HCC)     Closed fracture of fifth thoracic vertebra (HCC)     Diverticulitis of large intestine with abscess without bleeding     Elevated alkaline phosphatase level     Chronic pancreatitis (HCC)     Erythema ab igne     Compression fracture of thoracic vertebra (HCC)     Pathological compression fracture of lumbar vertebra with delayed healing     Metabolic acidosis with increased anion gap and accumulation of organic acids     Community acquired pneumonia of left lower lobe of lung     Septicemia ([address]     Alcohol-induced acute pancreatitis     Fluid collection of pancreas     Diverticulitis of large intestine without perforation or abscess with bleeding     Pseudocyst of pancreas     Sepsis ([address]     Acute recurrent pancreatitis     Atelectasis of left lung     Hyponatremia     Iron deficiency anemia     Adenomatous polyp of sigmoid colon     Moderate episode of recurrent major depressive disorder (HCC)     Sleep disturbance     Intractable migraine without aura and without status migrainosus     Acute on chronic respiratory failure with hypoxia [facility])        Treatment Plan:     IMPRESSION:    Elevated troponin likely demand ischemia in the setting of sepsis  Acute CHF exacerbation  Acute hypoxic respiratory failure, intubated  Hypertension  Anemia  COPD  MDD  Smoking history  Polysubstance abuse   chronic pancreatitis  Hypokalemia       RECOMMENDATIONS:  Echo showing EF of more than 60%  Discontinue diuretics yesterday he received gentle fluids for about 12 hours overnight  Started on Toprol yesterday, switched to Lopressor 12.5 twice daily due to extended release cannot be crushed  Continue Lopressor 12.5 mg twice daily and Lipitor  Avoid QTC prolonging medication  Replace K to keep K greater than 4, mag greater than 2  Rest of care as per efrain Whitt  PGY-2  Internal Medicine  [address] [place] [address]   7:09 AM 1/6/2023      I performed a history and physical examination of the patient and discussed management with the resident. I reviewed the residents note and agree with the documented findings and plan of care. Any areas of disagreement are noted on the chart. I was personally present for the key portions of any procedures. I have documented in the chart those procedures where I was not present during the key portions. I have personally evaluated this patient and have completed at least one if not all key elements of the E/M (history, physical exam, and MDM). Additional findings are as noted.     Lonnie Matias MD

## 2023-01-07 LAB
ABSOLUTE EOS #: <0.03 K/UL (ref 0–0.44)
ABSOLUTE IMMATURE GRANULOCYTE: 0.1 K/UL (ref 0–0.3)
ABSOLUTE LYMPH #: 2.36 K/UL (ref 1.1–3.7)
ABSOLUTE MONO #: 0.94 K/UL (ref 0.1–1.2)
ALLEN TEST: POSITIVE
ANION GAP SERPL CALCULATED.3IONS-SCNC: 10 MMOL/L (ref 9–17)
BASOPHILS # BLD: 0 % (ref 0–2)
BASOPHILS ABSOLUTE: 0.03 K/UL (ref 0–0.2)
BUN BLDV-MCNC: 22 MG/DL (ref 6–20)
CALCIUM SERPL-MCNC: 9.1 MG/DL (ref 8.6–10.4)
CHLORIDE BLD-SCNC: 101 MMOL/L (ref 98–107)
CO2: 26 MMOL/L (ref 20–31)
CREAT SERPL-MCNC: 0.29 MG/DL (ref 0.5–0.9)
CULTURE: NORMAL
DIRECT EXAM: NORMAL
EOSINOPHILS RELATIVE PERCENT: 0 % (ref 1–4)
FIO2: 40
GFR SERPL CREATININE-BSD FRML MDRD: >60 ML/MIN/1.73M2
GLUCOSE BLD-MCNC: 173 MG/DL (ref 70–99)
GLUCOSE BLD-MCNC: 186 MG/DL (ref 74–100)
HCT VFR BLD CALC: 36.8 % (ref 36.3–47.1)
HEMOGLOBIN: 11.7 G/DL (ref 11.9–15.1)
IMMATURE GRANULOCYTES: 1 %
LYMPHOCYTES # BLD: 20 % (ref 24–43)
MCH RBC QN AUTO: 28.7 PG (ref 25.2–33.5)
MCHC RBC AUTO-ENTMCNC: 31.8 G/DL (ref 28.4–34.8)
MCV RBC AUTO: 90.4 FL (ref 82.6–102.9)
MONOCYTES # BLD: 8 % (ref 3–12)
NRBC AUTOMATED: 0 PER 100 WBC
PDW BLD-RTO: 15.5 % (ref 11.8–14.4)
PLATELET # BLD: 434 K/UL (ref 138–453)
PMV BLD AUTO: 10.6 FL (ref 8.1–13.5)
POC HCO3: 28.2 MMOL/L (ref 21–28)
POC O2 SATURATION: 98 % (ref 94–98)
POC PCO2: 45.4 MM HG (ref 35–48)
POC PH: 7.4 (ref 7.35–7.45)
POC PO2: 103.9 MM HG (ref 83–108)
POSITIVE BASE EXCESS, ART: 3 (ref 0–3)
POTASSIUM SERPL-SCNC: 3.7 MMOL/L (ref 3.7–5.3)
RBC # BLD: 4.07 M/UL (ref 3.95–5.11)
RBC # BLD: ABNORMAL 10*6/UL
SAMPLE SITE: ABNORMAL
SEG NEUTROPHILS: 71 % (ref 36–65)
SEGMENTED NEUTROPHILS ABSOLUTE COUNT: 8.64 K/UL (ref 1.5–8.1)
SODIUM BLD-SCNC: 137 MMOL/L (ref 135–144)
SPECIMEN DESCRIPTION: NORMAL
WBC # BLD: 12.1 K/UL (ref 3.5–11.3)

## 2023-01-07 PROCEDURE — 6370000000 HC RX 637 (ALT 250 FOR IP)

## 2023-01-07 PROCEDURE — 80048 BASIC METABOLIC PNL TOTAL CA: CPT

## 2023-01-07 PROCEDURE — 82947 ASSAY GLUCOSE BLOOD QUANT: CPT

## 2023-01-07 PROCEDURE — 6360000002 HC RX W HCPCS: Performed by: STUDENT IN AN ORGANIZED HEALTH CARE EDUCATION/TRAINING PROGRAM

## 2023-01-07 PROCEDURE — 2500000003 HC RX 250 WO HCPCS: Performed by: STUDENT IN AN ORGANIZED HEALTH CARE EDUCATION/TRAINING PROGRAM

## 2023-01-07 PROCEDURE — 6370000000 HC RX 637 (ALT 250 FOR IP): Performed by: NURSE PRACTITIONER

## 2023-01-07 PROCEDURE — 2060000000 HC ICU INTERMEDIATE R&B

## 2023-01-07 PROCEDURE — 36415 COLL VENOUS BLD VENIPUNCTURE: CPT

## 2023-01-07 PROCEDURE — 6360000002 HC RX W HCPCS: Performed by: NURSE PRACTITIONER

## 2023-01-07 PROCEDURE — 82803 BLOOD GASES ANY COMBINATION: CPT

## 2023-01-07 PROCEDURE — 2580000003 HC RX 258: Performed by: NURSE PRACTITIONER

## 2023-01-07 PROCEDURE — 94003 VENT MGMT INPAT SUBQ DAY: CPT

## 2023-01-07 PROCEDURE — 94761 N-INVAS EAR/PLS OXIMETRY MLT: CPT

## 2023-01-07 PROCEDURE — 6370000000 HC RX 637 (ALT 250 FOR IP): Performed by: INTERNAL MEDICINE

## 2023-01-07 PROCEDURE — 2580000003 HC RX 258: Performed by: STUDENT IN AN ORGANIZED HEALTH CARE EDUCATION/TRAINING PROGRAM

## 2023-01-07 PROCEDURE — 2700000000 HC OXYGEN THERAPY PER DAY

## 2023-01-07 PROCEDURE — 99291 CRITICAL CARE FIRST HOUR: CPT | Performed by: INTERNAL MEDICINE

## 2023-01-07 PROCEDURE — 36600 WITHDRAWAL OF ARTERIAL BLOOD: CPT

## 2023-01-07 PROCEDURE — 6370000000 HC RX 637 (ALT 250 FOR IP): Performed by: STUDENT IN AN ORGANIZED HEALTH CARE EDUCATION/TRAINING PROGRAM

## 2023-01-07 PROCEDURE — 2500000003 HC RX 250 WO HCPCS

## 2023-01-07 PROCEDURE — 94640 AIRWAY INHALATION TREATMENT: CPT

## 2023-01-07 PROCEDURE — 6360000002 HC RX W HCPCS: Performed by: INTERNAL MEDICINE

## 2023-01-07 PROCEDURE — 85025 COMPLETE CBC W/AUTO DIFF WBC: CPT

## 2023-01-07 RX ORDER — PRAZOSIN HYDROCHLORIDE 1 MG/1
1 CAPSULE ORAL NIGHTLY
Status: DISCONTINUED | OUTPATIENT
Start: 2023-01-07 | End: 2023-01-11 | Stop reason: HOSPADM

## 2023-01-07 RX ORDER — PREDNISONE 10 MG/1
10 TABLET ORAL DAILY
Status: DISCONTINUED | OUTPATIENT
Start: 2023-01-17 | End: 2023-01-11 | Stop reason: HOSPADM

## 2023-01-07 RX ORDER — PREDNISONE 20 MG/1
20 TABLET ORAL DAILY
Status: DISCONTINUED | OUTPATIENT
Start: 2023-01-14 | End: 2023-01-11 | Stop reason: HOSPADM

## 2023-01-07 RX ORDER — PREDNISONE 20 MG/1
40 TABLET ORAL DAILY
Status: COMPLETED | OUTPATIENT
Start: 2023-01-08 | End: 2023-01-10

## 2023-01-07 RX ORDER — ALBUTEROL SULFATE 2.5 MG/3ML
2.5 SOLUTION RESPIRATORY (INHALATION) EVERY 4 HOURS PRN
Status: DISCONTINUED | OUTPATIENT
Start: 2023-01-07 | End: 2023-01-08

## 2023-01-07 RX ORDER — PANTOPRAZOLE SODIUM 40 MG/1
40 TABLET, DELAYED RELEASE ORAL NIGHTLY
Status: DISCONTINUED | OUTPATIENT
Start: 2023-01-08 | End: 2023-01-11 | Stop reason: HOSPADM

## 2023-01-07 RX ADMIN — PANCRELIPASE LIPASE, PANCRELIPASE PROTEASE, PANCRELIPASE AMYLASE 40000 UNITS: 20000; 63000; 84000 CAPSULE, DELAYED RELEASE ORAL at 18:00

## 2023-01-07 RX ADMIN — BUSPIRONE HYDROCHLORIDE 30 MG: 15 TABLET ORAL at 21:36

## 2023-01-07 RX ADMIN — HYDROXYZINE HYDROCHLORIDE 50 MG: 50 TABLET ORAL at 13:16

## 2023-01-07 RX ADMIN — SUCRALFATE 1 G: 1 TABLET ORAL at 22:23

## 2023-01-07 RX ADMIN — LIDOCAINE HYDROCHLORIDE 15 ML: 20 SOLUTION ORAL at 11:12

## 2023-01-07 RX ADMIN — PANTOPRAZOLE SODIUM 40 MG: 40 TABLET, DELAYED RELEASE ORAL at 08:27

## 2023-01-07 RX ADMIN — MIRTAZAPINE 15 MG: 15 TABLET, FILM COATED ORAL at 21:35

## 2023-01-07 RX ADMIN — ROPINIROLE HYDROCHLORIDE 1 MG: 1 TABLET, FILM COATED ORAL at 21:35

## 2023-01-07 RX ADMIN — PIPERACILLIN AND TAZOBACTAM 3375 MG: 3; .375 INJECTION, POWDER, FOR SOLUTION INTRAVENOUS at 17:49

## 2023-01-07 RX ADMIN — PIPERACILLIN AND TAZOBACTAM 3375 MG: 3; .375 INJECTION, POWDER, FOR SOLUTION INTRAVENOUS at 01:11

## 2023-01-07 RX ADMIN — METHYLPREDNISOLONE SODIUM SUCCINATE 20 MG: 40 INJECTION, POWDER, FOR SOLUTION INTRAMUSCULAR; INTRAVENOUS at 08:28

## 2023-01-07 RX ADMIN — TOPIRAMATE 25 MG: 25 TABLET, FILM COATED ORAL at 21:37

## 2023-01-07 RX ADMIN — TOPIRAMATE 25 MG: 25 TABLET, FILM COATED ORAL at 08:26

## 2023-01-07 RX ADMIN — METOPROLOL TARTRATE 12.5 MG: 25 TABLET ORAL at 21:35

## 2023-01-07 RX ADMIN — POTASSIUM BICARBONATE 20 MEQ: 782 TABLET, EFFERVESCENT ORAL at 08:49

## 2023-01-07 RX ADMIN — PANCRELIPASE LIPASE, PANCRELIPASE PROTEASE, PANCRELIPASE AMYLASE 40000 UNITS: 20000; 63000; 84000 CAPSULE, DELAYED RELEASE ORAL at 08:27

## 2023-01-07 RX ADMIN — BUSPIRONE HYDROCHLORIDE 30 MG: 15 TABLET ORAL at 08:28

## 2023-01-07 RX ADMIN — PREGABALIN 200 MG: 100 CAPSULE ORAL at 21:35

## 2023-01-07 RX ADMIN — IPRATROPIUM BROMIDE AND ALBUTEROL SULFATE 1 AMPULE: .5; 3 SOLUTION RESPIRATORY (INHALATION) at 08:48

## 2023-01-07 RX ADMIN — PRAZOSIN HYDROCHLORIDE 1 MG: 1 CAPSULE ORAL at 22:23

## 2023-01-07 RX ADMIN — Medication 100 MG: at 08:27

## 2023-01-07 RX ADMIN — PANCRELIPASE LIPASE, PANCRELIPASE PROTEASE, PANCRELIPASE AMYLASE 40000 UNITS: 20000; 63000; 84000 CAPSULE, DELAYED RELEASE ORAL at 13:16

## 2023-01-07 RX ADMIN — ENOXAPARIN SODIUM 40 MG: 100 INJECTION SUBCUTANEOUS at 08:28

## 2023-01-07 RX ADMIN — IPRATROPIUM BROMIDE AND ALBUTEROL SULFATE 1 AMPULE: .5; 3 SOLUTION RESPIRATORY (INHALATION) at 22:20

## 2023-01-07 RX ADMIN — SODIUM CHLORIDE, PRESERVATIVE FREE 10 ML: 5 INJECTION INTRAVENOUS at 08:49

## 2023-01-07 RX ADMIN — METOPROLOL TARTRATE 12.5 MG: 25 TABLET ORAL at 08:25

## 2023-01-07 RX ADMIN — CARBAMAZEPINE 100 MG: 100 SUSPENSION ORAL at 21:36

## 2023-01-07 RX ADMIN — ATORVASTATIN CALCIUM 20 MG: 20 TABLET, FILM COATED ORAL at 08:28

## 2023-01-07 RX ADMIN — SODIUM CHLORIDE: 9 INJECTION, SOLUTION INTRAVENOUS at 01:10

## 2023-01-07 RX ADMIN — CARBAMAZEPINE 100 MG: 100 SUSPENSION ORAL at 08:29

## 2023-01-07 RX ADMIN — Medication 50 MCG/HR: at 01:08

## 2023-01-07 RX ADMIN — IPRATROPIUM BROMIDE AND ALBUTEROL SULFATE 1 AMPULE: .5; 3 SOLUTION RESPIRATORY (INHALATION) at 12:46

## 2023-01-07 RX ADMIN — SUCRALFATE 1 G: 1 TABLET ORAL at 08:27

## 2023-01-07 RX ADMIN — PIPERACILLIN AND TAZOBACTAM 3375 MG: 3; .375 INJECTION, POWDER, FOR SOLUTION INTRAVENOUS at 08:49

## 2023-01-07 RX ADMIN — IPRATROPIUM BROMIDE AND ALBUTEROL SULFATE 1 AMPULE: .5; 3 SOLUTION RESPIRATORY (INHALATION) at 17:34

## 2023-01-07 RX ADMIN — SUCRALFATE 1 G: 1 TABLET ORAL at 13:15

## 2023-01-07 RX ADMIN — GLYCOPYRROLATE 0.1 MG: 0.2 INJECTION INTRAMUSCULAR; INTRAVENOUS at 08:28

## 2023-01-07 RX ADMIN — SUCRALFATE 1 G: 1 TABLET ORAL at 18:00

## 2023-01-07 RX ADMIN — Medication 3 MG/HR: at 01:09

## 2023-01-07 ASSESSMENT — PULMONARY FUNCTION TESTS
PIF_VALUE: 14
PIF_VALUE: 16
PIF_VALUE: 13
PIF_VALUE: 12
PIF_VALUE: 18
PIF_VALUE: 16
PIF_VALUE: 19
PIF_VALUE: 12
PIF_VALUE: 16
PIF_VALUE: 21

## 2023-01-07 NOTE — PROGRESS NOTES
Critical care team - Resident sign-out to medicine service      Date and time: 1/7/2023 4:10 PM  Patient's name:  Anish Hernandez Record Number: [id number]  Patient's account/billing number: [de-identified]  Patient's YOB: 1970  Age: 47 y.o. Date of Admission: 1/1/2023 12:55 PM  Length of stay during current admission: 6    Primary Care Physician: Amarilis Bush MD    Code Status: Full Code    Mode of physician to physician communication:        [x] Via telephone   [] In person     Date and time of sign-out: 1/7/2023 4:10 PM    Accepting Internal Medicine resident: Dr. Connor Sigala    Accepting Medicine team: IM Team intermed    Accepting team's attending: Dr. Connor Sigala    Patient's current ICU Bed:  9586     Patient's assigned bed on floor:          [] Med-Surg Monitored [x] Step-down       [] Psychiatry ICU       [] Psych floor     Reason for ICU admission:     [x] Intubated (date 1/4/2023 ) and Extubated (date 1/7/2023)                                   []  Protect airway      [x] ARF              [] Other     [x] Monitoring more than 1 hour   [] Agressive IVF resuscitation       [] Titrating drips to maintain hemodynamic stability                   []  Levo                [] Vaso               [] Ashvin                [] other NOT APPLICABLE  [] Post arrest   [] CRRT/ CVVH        ICU course summary:   58-year-old female Mrs. Justina Samayoa with past medical history significant for chronic pancreatitis, polysubstance abuse, ethanol, major depressive disorder with multiple psych medication came into the ED with altered mental status and desatted to 42%. Lab in the ED was remarkable for leukocytosis 17.5 and UA concerning for UTI. Trauma work-up was negative CT pulmonary was also negative but did demonstrate diffuse groundglass opacities with mosaic type distribution. Patient was transferred to the ICU for further respiratory support. And was put on alternating BiPAP with high flow nasal cannula. Cardio was consulted in the ICU for uptrending troponin which eventually plateaued and echo was done which was unremarkable for any ischemic changes. But on 2023, patient became altered and was in the setting AVM in the BiPAP with 100% FiO2. A decision was made to intubate the patient and put him on ventilator. Patient was put on IV Zosyn for suspected pneumonia. Currently on Zosyn. Subsequently patient respiratory status progressively improved. On 2023, patient CPAP well and was ready for extubation. On 2023, patient was extubated and was ready to be transferred. Currently she is on 3 L of nasal cannula oxygen    Procedures during patient's ICU stay:     [] CVC (date )         [] HD ( access )           []  EGD/ Colonoscopy   []  EEG   []  Other     Current:     Temp (24hrs), Av.3 °F (36.8 °C), Min:97.5 °F (36.4 °C), Max:98.9 °F (06.2 °C)    Systolic (74XBE), CEH:572 , Min:96 , ATI:047     Diastolic (78SNT), PPH:41, Min:34, Max:98      Support device:   [] Ventilator [] BIPAP  [x] Nasal Cannula [] Room Air    Nutrition:    [] NPO [] Tube Feeding (Specify: ) [] TPN  [] PO    Cultures:     Blood cultures:                  [] None drawn        [x] Negative               []  Positive (Details:  )      Urine Culture:                     [] None drawn        [x] Negative               []  Positive (Details:  )       Endotracheal aspirate:       [] None drawn         [x] Negative               []  Positive (Details:  )       Consults:     Consultations:  [x] Cardiology   [] Nephrology      [] Hemo onco   [] GI   [] ID                            [] ENT      []Physiotherapy     Others:-**    Assessment:     Principal Problem:    Acute on chronic respiratory failure with hypoxia ([address]  Active Problems:    Metabolic encephalopathy  Resolved Problems:    * No resolved hospital problems.  *      Recommended Follow-up:     Continue Zosyn   Review all the psych med and continue as needed  Continue to monitor BMP and replace electrolyte as needed  Continue to monitor CBC and trend WBC count since it is uptrending  Continue to monitor respiratory status, currently on 3 L of NC, uses 3 L NC at baseline  COPD management and discharge planning required during discharge  Rest of the care as per primary    Discharge planning: Activity: as tolerated  Disposition: home/SNF   PO intake: ADULT DIET; Regular  [] NPO [] Tube Feeding (Specify: ) [] TPN  [] PO  Nicholas: Yes/No    Above mentioned assessment and plan was discussed by me with the admitting medicine resident. The medicine team assigned to the patient by medicine admitting resident will be following up the patient from now onwards on the floor.

## 2023-01-07 NOTE — PROGRESS NOTES
INTENSIVE CARE UNIT  Resident Physician Progress Note    Patient - Yaz Jennings  Date of Admission -  2023 12:55 PM  Date of Evaluation -  2023  Room and Bed Number -  0022/6375-95   Hospital Day - 6      SUBJECTIVE:     OVERNIGHT EVENTS: Patient received the robuinol yesterday. No acute event overnight. Hemodynamically stable without any pressor support. Plan today is to extubate. TODAY: Patient is alert and oreinted. Following commands. Currently on 50 of fentanyl and 3 of versed. Will hold the tube feed this AM session and give her sedation holiday,SBT. Plan is to extubate her today. Abnirmal lab evaluation this morning shows:WBC up trending to 12. 1. She has an external cath in and drained 1.04 L . Pottasium 3.7,replaced as needed.     AWAKE & FOLLOWING COMMANDS:  [] No   [x] Yes    SECRETIONS Amount:  [] Small [x] Moderate  [] Large  [] None  Color:     [] White [] Colored  [] Bloody    SEDATION:  RAAS Score:  [] Propofol gtt  [x] Versed gtt  [] Ativan gtt    No Sedation    PAR[]ALYZED:  [x] No    [] Yes    VASOPRESSORS:  [x] No    [] Yes  [] Levophed [] Dopamine [] Vasopressin  [] Dobutamine [] Phenylephrine [] Epinephrine      OBJECTIVE:     VITAL SIGNS:  /77   Pulse 67   Temp 98.1 °F (36.7 °C) (Oral)   Resp 18   Ht 5' 6\" (1.676 m)   Wt 141 lb 8.6 oz (64.2 kg)   SpO2 93%   BMI 22.84 kg/m²   Tmax over 24 hours:  Temp (24hrs), Av.5 °F (36.9 °C), Min:97.5 °F (36.4 °C), Max:99 °F (37.2 °C)      Patient Vitals for the past 8 hrs:   BP Temp Temp src Pulse Resp SpO2   23 0600 113/77 98.1 °F (36.7 °C) Oral 67 18 93 %   23 0511 -- -- -- 68 19 98 %   23 0500 124/79 -- -- 71 17 98 %   23 0400 114/78 98.9 °F (37.2 °C) Oral 64 18 100 %   23 0353 -- -- -- 63 18 97 %   23 0200 108/71 98.5 °F (36.9 °C) Oral 64 18 99 %   23 0100 96/65 -- -- 64 18 93 %         Intake/Output Summary (Last 24 hours) at 2023 0823  Last data filed at 2023 0817  Gross per 24 hour   Intake 1621.82 ml   Output 273 ml   Net 1348.82 ml     Date 01/07/23 0000 - 01/07/23 2359   Shift 9033-8441 9741-6134 2856-4190 24 Hour Total   INTAKE   NG/GT(mL/kg) 201(3.1) 304(4.7)  505(7.9)   Shift Total(mL/kg) 201(3.1) 304(4.7)  505(7.9)   OUTPUT   Shift Total(mL/kg)       Weight (kg) 64.2 64.2 64.2 64.2     Wt Readings from Last 3 Encounters:   01/01/23 141 lb 8.6 oz (64.2 kg)   11/01/22 152 lb (68.9 kg)   09/01/22 146 lb 9.6 oz (66.5 kg)     Body mass index is 22.84 kg/m².        PHYSICAL EXAM:  GEN:  awake, alert, cooperative, no apparent distress, and appears stated age  EYES:  Lids and lashes normal, pupils equal, round and reactive to light, extra ocular muscles intact, sclera clear, conjunctiva normal  HEENT:  Normal  LUNGS:  No increased work of breathing, good air exchange, clear to auscultation bilaterally, no crackles or wheezing  CV:    Normal apical impulse, regular rate and rhythm, normal S1 and S2, no S3 or S4, and no murmur noted  ABDOMEN:   No scars, normal bowel sounds, soft, non-distended, non-tender, no masses palpated, no hepatosplenomegaly    EXTREMITIES:  No pedal or leg edema, no calf tenderness/swelling, no erythema, distal pulses intact       MEDICATIONS:  Scheduled Meds:   potassium bicarb-citric acid  20 mEq Oral Once    metoprolol tartrate  12.5 mg Oral BID    glycopyrrolate  0.1 mg IntraVENous BID    carBAMazepine  100 mg Oral BID    methylPREDNISolone  20 mg IntraVENous Q12H    topiramate  25 mg Oral BID    ipratropium-albuterol  1 ampule Inhalation Q4H WA    piperacillin-tazobactam  3,375 mg IntraVENous Q8H    atorvastatin  20 mg Oral Daily    busPIRone  30 mg Oral BID    Pancrelipase (Lip-Prot-Amyl)  40,000 Units Oral TID WC    mirtazapine  15 mg Oral Nightly    pantoprazole  40 mg Oral QAM AC    pregabalin  200 mg Oral Nightly    rOPINIRole  1 mg Oral Nightly    sucralfate  1 g Oral 4x Daily    sodium chloride flush  5-40 mL IntraVENous 2 times per day    enoxaparin  40  mg SubCUTAneous Daily    sodium chloride flush  5-40 mL IntraVENous 2 times per day    thiamine  100 mg Oral Daily    magnesium sulfate  2,000 mg IntraVENous Once     Continuous Infusions:   propofol Stopped (01/05/23 0709)    fentaNYL 50 mcg/mL 50 mcg/hr (01/07/23 0108)    midazolam 3 mg/hr (01/07/23 0109)    sodium chloride 10 mL/hr at 01/06/23 0422    dexmedetomidine Stopped (01/04/23 1426)    sodium chloride 10 mL/hr at 01/07/23 0110     PRN Meds:   albuterol, 2.5 mg, Q4H PRN  ipratropium, 0.5 mg, 4x Daily PRN  lidocaine viscous hcl, 15 mL, Q3H PRN  fentanNYL, 50 mcg, Q2H PRN  hydrOXYzine HCl, 50 mg, TID PRN  sodium chloride flush, 10 mL, PRN  sodium chloride, , PRN  polyethylene glycol, 17 g, Daily PRN  acetaminophen, 650 mg, Q6H PRN   Or  acetaminophen, 650 mg, Q6H PRN  potassium chloride, 40 mEq, PRN   Or  potassium alternative oral replacement, 40 mEq, PRN   Or  potassium chloride, 10 mEq, PRN  magnesium sulfate, 2,000 mg, PRN  sodium chloride flush, 5-40 mL, PRN  sodium chloride, , PRN        SUPPORT DEVICES: [x] Ventilator [] BIPAP  [] Nasal Cannula [] Room Air    VENT SETTINGS (Comprehensive) (if applicable):   PRVC mode, 30 FiO2 %, PEEP 5, Respiratory Rate 18, Tidal Volume 470  Vent Information  Ventilator ID: [id number]  Equipment Changed: HME, Expiratory Filter  Ventilator Initiate: Yes  Vent Mode: AC/PRVC  Additional Respiratory Assessments  Heart Rate: 67  Resp: 18  SpO2: 93 %  End Tidal CO2: 35 (%)  Position: Semi-Enriquez's  Humidification Source: E  Humidification Temp: 33    ABGs:   Arterial Blood Gas result:  pH 7.4 pCO2 45.4; pO2 103.9; HCO3 26  Lab Results   Component Value Date/Time    VEH1PIJ NOT REPORTED 07/26/2021 03:38 PM    FIO2 40.0 01/07/2023 04:38 AM         DATA:  Complete Blood Count:   Recent Labs     01/06/23  0752 01/07/23  0456   WBC 9.8 12.1*   RBC 4.04 4.07   HGB 11.5* 11.7*   HCT 36.0* 36.8   MCV 89.1 90.4   MCH 28.5 28.7   MCHC 31.9 31.8   RDW 15.4* 15.5*    434   MPV 10.5 10.6        Last 3 Blood Glucose:   Recent Labs     01/05/23  0625 01/06/23  0443 01/07/23  0456   GLUCOSE 116* 152* 173*        PT/INR:    Lab Results   Component Value Date/Time    PROTIME 11.1 01/01/2023 01:12 PM    INR 1.0 01/01/2023 01:12 PM     PTT:    Lab Results   Component Value Date/Time    APTT 25.8 01/01/2023 01:12 PM       Comprehensive Metabolic Profile:   Recent Labs     01/05/23  0625 01/05/23  1607 01/06/23 0443 01/07/23  0456     --  140 137   K 3.5* 3.9 3.8 3.7     --  102 101   CO2 29  --  28 26   BUN 23*  --  23* 22*   CREATININE 0.33*  --  0.26* 0.29*   GLUCOSE 116*  --  152* 173*   CALCIUM 7.4*  --  8.9 9.1   PROT 6.5  --   --   --    LABALBU 3.1*  --   --   --    BILITOT 0.4  --   --   --    ALKPHOS 157*  --   --   --    AST 33*  --   --   --    ALT 30  --   --   --       Magnesium:   Lab Results   Component Value Date/Time    MG 1.6 01/06/2023 04:43 AM    MG 1.7 01/05/2023 06:25 AM    MG 1.8 01/04/2023 07:05 AM     Phosphorus:   Lab Results   Component Value Date/Time    PHOS 3.7 07/23/2021 06:29 AM    PHOS 2.5 10/27/2019 08:33 PM    PHOS 4.0 07/14/2019 05:29 AM     Ionized Calcium:   Lab Results   Component Value Date/Time    CAION 0.94 01/01/2023 08:21 PM    CAION 0.93 01/01/2023 03:05 PM    CAION 1.30 07/22/2021 03:22 AM        Urinalysis:   Lab Results   Component Value Date/Time    NITRU NEGATIVE 01/01/2023 01:54 PM    COLORU DARK YELLOW 01/01/2023 01:54 PM    PHUR 5.5 01/01/2023 01:54 PM    WBCUA 2 TO 5 01/01/2023 01:54 PM    RBCUA 0 TO 2 01/01/2023 01:54 PM    MUCUS 2+ 09/01/2022 11:50 AM    TRICHOMONAS NOT REPORTED 10/12/2021 04:15 PM    YEAST MANY 01/01/2023 01:54 PM    BACTERIA MODERATE 01/01/2023 01:54 PM    CLARITYU cloudy 12/18/2018 03:46 PM    SPECGRAV 1.020 01/01/2023 01:54 PM    LEUKOCYTESUR SMALL 01/01/2023 01:54 PM    UROBILINOGEN ELEVATED 01/01/2023 01:54 PM    BILIRUBINUR NEGATIVE  Verified by ictotest. 01/01/2023 01:54 PM    BILIRUBINUR moderate 12/18/2018 03:46 PM    BLOODU neg 12/18/2018 03:46 PM    GLUCOSEU NEGATIVE 01/01/2023 01:54 PM    KETUA SMALL 01/01/2023 01:54 PM    AMORPHOUS 2+ 09/01/2022 11:50 AM       HgBA1c:    Lab Results   Component Value Date/Time    LABA1C 5.5 04/13/2021 11:34 AM     TSH:    Lab Results   Component Value Date/Time    TSH 1.40 01/02/2023 04:03 AM     Lactic Acid: No results found for: LACTA   Troponin: No results for input(s): TROPONINI in the last 72 hours.         ASSESSMENT:     Patient Active Problem List    Diagnosis Date Noted    Acute on chronic respiratory failure with hypoxia ([address] 01/01/2023    Intractable migraine without aura and without status migrainosus 05/24/2022    Sleep disturbance 05/04/2022    Moderate episode of recurrent major depressive disorder ([address] 12/14/2021    Adenomatous polyp of sigmoid colon     Hyponatremia 10/12/2021    Iron deficiency anemia 10/12/2021    Sepsis ([address] 08/09/2021    Acute recurrent pancreatitis 08/09/2021    Atelectasis of left lung 08/09/2021    Fluid collection of pancreas     Diverticulitis of large intestine without perforation or abscess with bleeding     Pseudocyst of pancreas     Alcohol-induced acute pancreatitis 07/31/2021    Community acquired pneumonia of left lower lobe of lung 07/29/2021    Septicemia ([address]     Metabolic acidosis with increased anion gap and accumulation of organic acids 07/28/2021    Compression fracture of thoracic vertebra (HCC)     Pathological compression fracture of lumbar vertebra with delayed healing     Closed fracture of fifth thoracic vertebra ([address] 07/22/2021    Diverticulitis of large intestine with abscess without bleeding 07/22/2021    Elevated alkaline phosphatase level 07/22/2021    Chronic pancreatitis ([address] 07/22/2021    Erythema ab igne 07/22/2021    Severe sepsis ([address] 07/21/2021    Multilevel spine pain 06/30/2021    Chronic pain syndrome 06/30/2021    Marijuana abuse 06/30/2021    Chronic peripheral neuropathic pain 05/04/2021 History of alcohol abuse 05/04/2021    History of petit-mal seizures 05/04/2021    Intractable episodic tension-type headache 05/04/2021    Personal history of astrocytoma 05/04/2021    Esophageal dysphagia     Major depressive disorder, recurrent severe without psychotic features ([address] 03/31/2021    AMAN (generalized anxiety disorder) 03/22/2021    Perineal mass, female 10/23/2020    Esophagitis candidal  10/23/2020    Condyloma 10/23/2020    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24, the patient underwent 2 surgical resections without known recurrence 10/23/2020    Alcohol use disorder, severe, in early remission ([address] 75/37/1459    Metabolic encephalopathy     Recurrent major depressive disorder ([address] 10/20/2020    Elevated CA 19-9 level 10/20/2020    Pancreatic cyst 10/19/2020    Acute respiratory failure with hypoxia ([address] 10/16/2020    Paresthesia of lower extremity 10/13/2020    COPD with acute exacerbation ([address] 10/12/2020    Seizure disorder ([address]     Ambulatory dysfunction 12/17/2019    Tobacco use     Hypomagnesemia 08/51/8301    Alcoholic peripheral neuropathy ([address] 12/14/2019    Hypokalemia 12/14/2019    Macrocytic anemia 12/14/2019    Psychophysiological insomnia 06/05/2019    Anxiety 06/05/2019    Essential hypertension 08/13/2015    Nicotine addiction 08/13/2015    Reflex sympathetic dystrophy of lower limb 03/27/2014    Acute bronchitis 10/02/2012          PLAN:     WEAN PER PROTOCOL:  [] No   [x] Yes  [] N/A    ICU PROPHYLAXIS:  Stress ulcer:  [x] PPI Agent  [] [id number] [] Sucralfate  [] Other:  VTE:   [x] Enoxaparin  [] Unfract.  Heparin Subcut  [] EPC Cuffs    NUTRITION:  [] NPO [x] Tube Feeding (Specify: ) [] TPN  [] PO    HOME MEDS RECONCILED: [] No  [x] Yes    CONSULTATION NEEDED:  [] No  [x] Yes    FAMILY UPDATED:    [] No  [x] Yes    TRANSFER OUT OF ICU:   [x] No  [] Yes      Plan:    Neuro  Metabolic encephalopathy  Hx alcohol abuse, hx chronic pancreatitis  Precedex gtt  Hx anxiety, on atarax at home  Hx COPD, on 4 L NC at home, has not been compliant  Seizure precautions  CIWA protocol  Buspar 30 BID  Carbamazepine 200 TID, Topamax 50 BID  Remeron 15  Pregabalin 200  Requip 1 mg  Patient is intubated, sedated, currently on ventilato     Neurochecks per protocol  POSSIBLE EXTUBATION TODAY        Cardiology  Cardiology consulted  EKG 1/1 with T wave inversions laterally  Avoid Qtc prolonging medications  Hx seizure disorder on carbamazepine  Lipitor  Coreg 3.125 BID  Lisinopril 5 daily  Echo showed hyperdynamic circulation, Lasix discontinued     Pulmonary  Intubated, sedated currently on ventilator with above-mentioned vent setting  DuoNeb  Symbicort  Monitor respiratory status  ABG PRN     Renal  Monitor intake output  Cr 0.54     GI  Tube feeding, orogastric peptide-based  Hx alcohol abuse with chronic pancreatitis  Ammonia 69  Lactulose 20 g TID  Lipase-protease-amylase 30,000 TID  Pancrelipase 40,000 TID  CT abdomen showing N/V mid. Patient. Pericholecystic fluid, ultrasonogram right upper quadrant and gallbladder showed a cystlike etiology alcoholic  Protonix 40   Carafate 1 g QID  Thiamine  Alk phos elevated AST to ALT ratio >2 indicating alcoholic cirrhosis  Ultrasound of RUQ shows:hepatic parenchymal changes suggestive of cirrhosis.      ID  Zosyn  Given vanco and zosyn in ED  WBC 6.3     Endo  Glucose 121            Lucie Chawla MD  [address] [id number]

## 2023-01-07 NOTE — PLAN OF CARE
Problem: Discharge Planning  Goal: Discharge to home or other facility with appropriate resources  Outcome: Progressing  Flowsheets (Taken 1/6/2023 2000)  Discharge to home or other facility with appropriate resources: Identify barriers to discharge with patient and caregiver     Problem: Pain  Goal: Verbalizes/displays adequate comfort level or baseline comfort level  Outcome: Progressing  Flowsheets (Taken 1/6/2023 2000)  Verbalizes/displays adequate comfort level or baseline comfort level: Assess pain using appropriate pain scale     Problem: Skin/Tissue Integrity  Goal: Absence of new skin breakdown  Description: 1. Monitor for areas of redness and/or skin breakdown  2. Assess vascular access sites hourly  3. Every 4-6 hours minimum:  Change oxygen saturation probe site  4. Every 4-6 hours:  If on nasal continuous positive airway pressure, respiratory therapy assess nares and determine need for appliance change or resting period. Outcome: Progressing     Problem: Nutrition Deficit:  Goal: Optimize nutritional status  Outcome: Progressing     Problem: Respiratory - Adult  Goal: Achieves optimal ventilation and oxygenation  Outcome: Progressing  Flowsheets (Taken 1/6/2023 2000)  Achieves optimal ventilation and oxygenation: Assess for changes in respiratory status     Problem: Confusion  Goal: Confusion, delirium, dementia, or psychosis is improved or at baseline  Description: INTERVENTIONS:  1. Assess for possible contributors to thought disturbance, including medications, impaired vision or hearing, underlying metabolic abnormalities, dehydration, psychiatric diagnoses, and notify attending LIP  2. [place] high risk fall precautions, as indicated  3. Provide frequent short contacts to provide reality reorientation, refocusing and direction  4. Decrease environmental stimuli, including noise as appropriate  5.  Monitor and intervene to maintain adequate nutrition, hydration, elimination, sleep and activity  6. If unable to ensure safety without constant attention obtain sitter and review sitter guidelines with assigned personnel  7.  Initiate Psychosocial CNS and Spiritual Care consult, as indicated  Outcome: Progressing     Problem: Safety - Adult  Goal: Free from fall injury  Outcome: Progressing  Flowsheets (Taken 1/7/2023 0520)  Free From Fall Injury: Instruct family/caregiver on patient safety     Problem: ABCDS Injury Assessment  Goal: Absence of physical injury  Outcome: Progressing  Flowsheets (Taken 1/7/2023 0520)  Absence of Physical Injury: Implement safety measures based on patient assessment

## 2023-01-07 NOTE — PROGRESS NOTES
Physician Progress Note      PATIENTLylia Schwab  CSN #:                  [id number]  :                       1970  ADMIT DATE:       2023 12:55 PM  DISCH DATE:  RESPONDING  PROVIDER #:        Aureliano Ayala          QUERY TEXT:    Patient admitted with acute respiratory failure. Documentation reflects sepsis   and UTI in H&P, sepsis and pneumonia per cardiology. If possible, please   document in the progress notes and discharge summary if sepsis was: The medical record reflects the following:  Risk Factors: UTI, pneumonia  Clinical Indicators: per H&P \"Possible sepsis with lactic acidosis, UTI or   cholecystitis are possible causes\", cholecystitis ruled out per GB US, UA   shows small leukocytes, urine cx no growth, WBC 17.5, lactic acid 3, HR   initially 90's, RR max 47, acute respiratory failure and metabolic   encephalopathy documented, per cardiology \"Pneumonia with acute CHF\", CXR   showed diffuse infiltrates  Treatment: IV Vanco and Zosyn, Solumedrol, O2 per NRB/HHF NC--required   emergent intubation , CXR, CT's, labs, cultures, monitoring    Thank you, Dulce Licona, CDI  email - [email or web address]  cell- [phone]  office hours [id number]  Options provided:  -- sepsis confirmed after study  -- pneumonia and UTI without Sepsis  -- pneumonia without Sepsis  -- UTI without Sepsis  -- Other - I will add my own diagnosis  -- Disagree - Not applicable / Not valid  -- Disagree - Clinically unable to determine / Unknown  -- Refer to Clinical Documentation Reviewer    PROVIDER RESPONSE TEXT:    This patient has pneumonia without Sepsis.     Query created by: Jessica Alonso on 2023 11:19 AM      Electronically signed by:  Aureliano Ayala 2023 4:10 PM

## 2023-01-07 NOTE — PROGRESS NOTES
Attempted to call report to [facility], RN not available and will call back

## 2023-01-07 NOTE — PROGRESS NOTES
Received report from [facility]. Waiting for pt arrival to [facility].       Celestina MONZON

## 2023-01-07 NOTE — PROGRESS NOTES
Pt admitted to [facility] room 546. Vitals taken. Call light within reach.     Celestina MONZON

## 2023-01-08 PROBLEM — R41.82 ALTERED MENTAL STATUS: Status: ACTIVE | Noted: 2023-01-08

## 2023-01-08 LAB
ABSOLUTE EOS #: 0.07 K/UL (ref 0–0.44)
ABSOLUTE IMMATURE GRANULOCYTE: 0.13 K/UL (ref 0–0.3)
ABSOLUTE LYMPH #: 3.93 K/UL (ref 1.1–3.7)
ABSOLUTE MONO #: 1.18 K/UL (ref 0.1–1.2)
ANION GAP SERPL CALCULATED.3IONS-SCNC: 12 MMOL/L (ref 9–17)
BASOPHILS # BLD: 0 % (ref 0–2)
BASOPHILS ABSOLUTE: 0.03 K/UL (ref 0–0.2)
BUN BLDV-MCNC: 15 MG/DL (ref 6–20)
CALCIUM SERPL-MCNC: 8.4 MG/DL (ref 8.6–10.4)
CHLORIDE BLD-SCNC: 106 MMOL/L (ref 98–107)
CO2: 23 MMOL/L (ref 20–31)
CREAT SERPL-MCNC: 0.4 MG/DL (ref 0.5–0.9)
EOSINOPHILS RELATIVE PERCENT: 1 % (ref 1–4)
FERRITIN: 182 NG/ML (ref 13–150)
GFR SERPL CREATININE-BSD FRML MDRD: >60 ML/MIN/1.73M2
GLUCOSE BLD-MCNC: 149 MG/DL (ref 70–99)
GLUCOSE BLD-MCNC: 162 MG/DL (ref 65–105)
HCT VFR BLD CALC: 33.5 % (ref 36.3–47.1)
HEMOGLOBIN: 10.6 G/DL (ref 11.9–15.1)
IMMATURE GRANULOCYTES: 1 %
IRON SATURATION: 14 % (ref 20–55)
IRON: 35 UG/DL (ref 37–145)
LYMPHOCYTES # BLD: 32 % (ref 24–43)
MCH RBC QN AUTO: 28.5 PG (ref 25.2–33.5)
MCHC RBC AUTO-ENTMCNC: 31.6 G/DL (ref 28.4–34.8)
MCV RBC AUTO: 90.1 FL (ref 82.6–102.9)
MONOCYTES # BLD: 10 % (ref 3–12)
NRBC AUTOMATED: 0 PER 100 WBC
PDW BLD-RTO: 15.6 % (ref 11.8–14.4)
PLATELET # BLD: 464 K/UL (ref 138–453)
PMV BLD AUTO: 10.6 FL (ref 8.1–13.5)
POTASSIUM SERPL-SCNC: 3.7 MMOL/L (ref 3.7–5.3)
RBC # BLD: 3.72 M/UL (ref 3.95–5.11)
RBC # BLD: ABNORMAL 10*6/UL
SEG NEUTROPHILS: 56 % (ref 36–65)
SEGMENTED NEUTROPHILS ABSOLUTE COUNT: 6.99 K/UL (ref 1.5–8.1)
SODIUM BLD-SCNC: 141 MMOL/L (ref 135–144)
TOTAL IRON BINDING CAPACITY: 242 UG/DL (ref 250–450)
UNSATURATED IRON BINDING CAPACITY: 207 UG/DL (ref 112–347)
WBC # BLD: 12.3 K/UL (ref 3.5–11.3)

## 2023-01-08 PROCEDURE — 6360000002 HC RX W HCPCS: Performed by: INTERNAL MEDICINE

## 2023-01-08 PROCEDURE — 2580000003 HC RX 258: Performed by: STUDENT IN AN ORGANIZED HEALTH CARE EDUCATION/TRAINING PROGRAM

## 2023-01-08 PROCEDURE — 97166 OT EVAL MOD COMPLEX 45 MIN: CPT

## 2023-01-08 PROCEDURE — 6370000000 HC RX 637 (ALT 250 FOR IP): Performed by: NURSE PRACTITIONER

## 2023-01-08 PROCEDURE — 6360000002 HC RX W HCPCS: Performed by: STUDENT IN AN ORGANIZED HEALTH CARE EDUCATION/TRAINING PROGRAM

## 2023-01-08 PROCEDURE — 6370000000 HC RX 637 (ALT 250 FOR IP): Performed by: INTERNAL MEDICINE

## 2023-01-08 PROCEDURE — 99233 SBSQ HOSP IP/OBS HIGH 50: CPT | Performed by: INTERNAL MEDICINE

## 2023-01-08 PROCEDURE — 97535 SELF CARE MNGMENT TRAINING: CPT

## 2023-01-08 PROCEDURE — 6370000000 HC RX 637 (ALT 250 FOR IP)

## 2023-01-08 PROCEDURE — 83540 ASSAY OF IRON: CPT

## 2023-01-08 PROCEDURE — 80048 BASIC METABOLIC PNL TOTAL CA: CPT

## 2023-01-08 PROCEDURE — 85025 COMPLETE CBC W/AUTO DIFF WBC: CPT

## 2023-01-08 PROCEDURE — 6360000002 HC RX W HCPCS: Performed by: NURSE PRACTITIONER

## 2023-01-08 PROCEDURE — 82947 ASSAY GLUCOSE BLOOD QUANT: CPT

## 2023-01-08 PROCEDURE — 94640 AIRWAY INHALATION TREATMENT: CPT

## 2023-01-08 PROCEDURE — 94760 N-INVAS EAR/PLS OXIMETRY 1: CPT

## 2023-01-08 PROCEDURE — 83550 IRON BINDING TEST: CPT

## 2023-01-08 PROCEDURE — 99232 SBSQ HOSP IP/OBS MODERATE 35: CPT | Performed by: INTERNAL MEDICINE

## 2023-01-08 PROCEDURE — 6370000000 HC RX 637 (ALT 250 FOR IP): Performed by: STUDENT IN AN ORGANIZED HEALTH CARE EDUCATION/TRAINING PROGRAM

## 2023-01-08 PROCEDURE — 94664 DEMO&/EVAL PT USE INHALER: CPT

## 2023-01-08 PROCEDURE — 36415 COLL VENOUS BLD VENIPUNCTURE: CPT

## 2023-01-08 PROCEDURE — 2700000000 HC OXYGEN THERAPY PER DAY

## 2023-01-08 PROCEDURE — 2060000000 HC ICU INTERMEDIATE R&B

## 2023-01-08 PROCEDURE — 82728 ASSAY OF FERRITIN: CPT

## 2023-01-08 PROCEDURE — 2580000003 HC RX 258: Performed by: NURSE PRACTITIONER

## 2023-01-08 RX ORDER — FOLIC ACID 1 MG/1
1 TABLET ORAL DAILY
Status: DISCONTINUED | OUTPATIENT
Start: 2023-01-08 | End: 2023-01-11 | Stop reason: HOSPADM

## 2023-01-08 RX ORDER — BACLOFEN 10 MG/1
10 TABLET ORAL 3 TIMES DAILY PRN
Status: DISCONTINUED | OUTPATIENT
Start: 2023-01-08 | End: 2023-01-11 | Stop reason: HOSPADM

## 2023-01-08 RX ORDER — MAGNESIUM SULFATE IN WATER 40 MG/ML
2000 INJECTION, SOLUTION INTRAVENOUS ONCE
Status: COMPLETED | OUTPATIENT
Start: 2023-01-08 | End: 2023-01-08

## 2023-01-08 RX ORDER — ALBUTEROL SULFATE 2.5 MG/3ML
2.5 SOLUTION RESPIRATORY (INHALATION) 4 TIMES DAILY
Status: DISCONTINUED | OUTPATIENT
Start: 2023-01-08 | End: 2023-01-08

## 2023-01-08 RX ORDER — LOPERAMIDE HYDROCHLORIDE 2 MG/1
2 CAPSULE ORAL 4 TIMES DAILY PRN
Status: DISCONTINUED | OUTPATIENT
Start: 2023-01-08 | End: 2023-01-11 | Stop reason: HOSPADM

## 2023-01-08 RX ORDER — METOCLOPRAMIDE 5 MG/1
5 TABLET ORAL
Status: DISCONTINUED | OUTPATIENT
Start: 2023-01-08 | End: 2023-01-11 | Stop reason: HOSPADM

## 2023-01-08 RX ORDER — SODIUM CHLORIDE 1000 MG
1 TABLET, SOLUBLE MISCELLANEOUS
Status: DISCONTINUED | OUTPATIENT
Start: 2023-01-08 | End: 2023-01-11 | Stop reason: HOSPADM

## 2023-01-08 RX ORDER — AMLODIPINE BESYLATE 10 MG/1
10 TABLET ORAL DAILY
Status: DISCONTINUED | OUTPATIENT
Start: 2023-01-08 | End: 2023-01-11 | Stop reason: HOSPADM

## 2023-01-08 RX ORDER — IPRATROPIUM BROMIDE AND ALBUTEROL SULFATE 2.5; .5 MG/3ML; MG/3ML
SOLUTION RESPIRATORY (INHALATION)
Status: COMPLETED
Start: 2023-01-08 | End: 2023-01-08

## 2023-01-08 RX ORDER — NICOTINE 21 MG/24HR
1 PATCH, TRANSDERMAL 24 HOURS TRANSDERMAL DAILY
Status: DISCONTINUED | OUTPATIENT
Start: 2023-01-08 | End: 2023-01-11 | Stop reason: HOSPADM

## 2023-01-08 RX ADMIN — CARBAMAZEPINE 100 MG: 100 SUSPENSION ORAL at 21:02

## 2023-01-08 RX ADMIN — PANCRELIPASE LIPASE, PANCRELIPASE PROTEASE, PANCRELIPASE AMYLASE 40000 UNITS: 20000; 63000; 84000 CAPSULE, DELAYED RELEASE ORAL at 10:15

## 2023-01-08 RX ADMIN — PANCRELIPASE LIPASE, PANCRELIPASE PROTEASE, PANCRELIPASE AMYLASE 40000 UNITS: 20000; 63000; 84000 CAPSULE, DELAYED RELEASE ORAL at 17:48

## 2023-01-08 RX ADMIN — PRAZOSIN HYDROCHLORIDE 1 MG: 1 CAPSULE ORAL at 21:02

## 2023-01-08 RX ADMIN — Medication 100 MG: at 09:44

## 2023-01-08 RX ADMIN — METOCLOPRAMIDE 5 MG: 5 TABLET ORAL at 16:46

## 2023-01-08 RX ADMIN — CARBAMAZEPINE 100 MG: 100 SUSPENSION ORAL at 10:09

## 2023-01-08 RX ADMIN — PIPERACILLIN AND TAZOBACTAM 3375 MG: 3; .375 INJECTION, POWDER, FOR SOLUTION INTRAVENOUS at 00:36

## 2023-01-08 RX ADMIN — SUCRALFATE 1 G: 1 TABLET ORAL at 13:14

## 2023-01-08 RX ADMIN — BUSPIRONE HYDROCHLORIDE 30 MG: 15 TABLET ORAL at 09:45

## 2023-01-08 RX ADMIN — FOLIC ACID 1 MG: 1 TABLET ORAL at 16:46

## 2023-01-08 RX ADMIN — PANTOPRAZOLE SODIUM 40 MG: 40 TABLET, DELAYED RELEASE ORAL at 21:03

## 2023-01-08 RX ADMIN — LOPERAMIDE HYDROCHLORIDE 2 MG: 2 CAPSULE ORAL at 16:50

## 2023-01-08 RX ADMIN — SUCRALFATE 1 G: 1 TABLET ORAL at 21:03

## 2023-01-08 RX ADMIN — PREGABALIN 200 MG: 100 CAPSULE ORAL at 21:03

## 2023-01-08 RX ADMIN — PREDNISONE 40 MG: 20 TABLET ORAL at 09:44

## 2023-01-08 RX ADMIN — TOPIRAMATE 25 MG: 25 TABLET, FILM COATED ORAL at 10:15

## 2023-01-08 RX ADMIN — ENOXAPARIN SODIUM 40 MG: 100 INJECTION SUBCUTANEOUS at 09:44

## 2023-01-08 RX ADMIN — PIPERACILLIN AND TAZOBACTAM 3375 MG: 3; .375 INJECTION, POWDER, FOR SOLUTION INTRAVENOUS at 17:28

## 2023-01-08 RX ADMIN — AMLODIPINE BESYLATE 10 MG: 10 TABLET ORAL at 16:46

## 2023-01-08 RX ADMIN — MIRTAZAPINE 15 MG: 15 TABLET, FILM COATED ORAL at 21:03

## 2023-01-08 RX ADMIN — SODIUM CHLORIDE TAB 1 GM 1 G: 1 TAB at 17:48

## 2023-01-08 RX ADMIN — SODIUM CHLORIDE, PRESERVATIVE FREE 10 ML: 5 INJECTION INTRAVENOUS at 11:13

## 2023-01-08 RX ADMIN — HYDROXYZINE HYDROCHLORIDE 50 MG: 50 TABLET ORAL at 03:43

## 2023-01-08 RX ADMIN — SUCRALFATE 1 G: 1 TABLET ORAL at 09:44

## 2023-01-08 RX ADMIN — IPRATROPIUM BROMIDE AND ALBUTEROL SULFATE 1 AMPULE: .5; 3 SOLUTION RESPIRATORY (INHALATION) at 20:23

## 2023-01-08 RX ADMIN — ACETAMINOPHEN 650 MG: 325 TABLET ORAL at 10:13

## 2023-01-08 RX ADMIN — IPRATROPIUM BROMIDE AND ALBUTEROL SULFATE 1 AMPULE: .5; 3 SOLUTION RESPIRATORY (INHALATION) at 16:27

## 2023-01-08 RX ADMIN — TOPIRAMATE 25 MG: 25 TABLET, FILM COATED ORAL at 21:02

## 2023-01-08 RX ADMIN — IPRATROPIUM BROMIDE AND ALBUTEROL SULFATE 3 ML: .5; 3 SOLUTION RESPIRATORY (INHALATION) at 13:00

## 2023-01-08 RX ADMIN — SUCRALFATE 1 G: 1 TABLET ORAL at 17:48

## 2023-01-08 RX ADMIN — ROPINIROLE HYDROCHLORIDE 1 MG: 1 TABLET, FILM COATED ORAL at 21:03

## 2023-01-08 RX ADMIN — BUSPIRONE HYDROCHLORIDE 30 MG: 15 TABLET ORAL at 21:03

## 2023-01-08 RX ADMIN — SODIUM CHLORIDE: 9 INJECTION, SOLUTION INTRAVENOUS at 00:35

## 2023-01-08 RX ADMIN — MAGNESIUM SULFATE HEPTAHYDRATE 2000 MG: 40 INJECTION, SOLUTION INTRAVENOUS at 16:54

## 2023-01-08 RX ADMIN — PIPERACILLIN AND TAZOBACTAM 3375 MG: 3; .375 INJECTION, POWDER, FOR SOLUTION INTRAVENOUS at 09:48

## 2023-01-08 RX ADMIN — ATORVASTATIN CALCIUM 20 MG: 20 TABLET, FILM COATED ORAL at 09:45

## 2023-01-08 ASSESSMENT — ENCOUNTER SYMPTOMS
ALLERGIC/IMMUNOLOGIC NEGATIVE: 1
EYE REDNESS: 0
VOMITING: 0
TROUBLE SWALLOWING: 0
DIARRHEA: 0
VOICE CHANGE: 0
PHOTOPHOBIA: 0
WHEEZING: 0
ABDOMINAL PAIN: 0
COUGH: 1
SORE THROAT: 0
SHORTNESS OF BREATH: 1

## 2023-01-08 NOTE — PLAN OF CARE
Problem: Discharge Planning  Goal: Discharge to home or other facility with appropriate resources  Outcome: Progressing  Flowsheets (Taken 1/7/2023 2000)  Discharge to home or other facility with appropriate resources:   Identify barriers to discharge with patient and caregiver   Arrange for needed discharge resources and transportation as appropriate     Problem: Pain  Goal: Verbalizes/displays adequate comfort level or baseline comfort level  Outcome: Progressing     Problem: Skin/Tissue Integrity  Goal: Absence of new skin breakdown  Description: 1. Monitor for areas of redness and/or skin breakdown  2. Assess vascular access sites hourly  3. Every 4-6 hours minimum:  Change oxygen saturation probe site  4. Every 4-6 hours:  If on nasal continuous positive airway pressure, respiratory therapy assess nares and determine need for appliance change or resting period. Outcome: Progressing     Problem: Nutrition Deficit:  Goal: Optimize nutritional status  Outcome: Progressing     Problem: Respiratory - Adult  Goal: Achieves optimal ventilation and oxygenation  Outcome: Progressing  Flowsheets (Taken 1/7/2023 2000)  Achieves optimal ventilation and oxygenation: Assess for changes in respiratory status     Problem: Confusion  Goal: Confusion, delirium, dementia, or psychosis is improved or at baseline  Description: INTERVENTIONS:  1. Assess for possible contributors to thought disturbance, including medications, impaired vision or hearing, underlying metabolic abnormalities, dehydration, psychiatric diagnoses, and notify attending LIP  2. [place] high risk fall precautions, as indicated  3. Provide frequent short contacts to provide reality reorientation, refocusing and direction  4. Decrease environmental stimuli, including noise as appropriate  5. Monitor and intervene to maintain adequate nutrition, hydration, elimination, sleep and activity  6.  If unable to ensure safety without constant attention obtain sitter and review sitter guidelines with assigned personnel  7.  Initiate Psychosocial CNS and Spiritual Care consult, as indicated  Outcome: Progressing     Problem: Safety - Adult  Goal: Free from fall injury  Outcome: Progressing  Flowsheets (Taken 1/8/2023 0039)  Free From Fall Injury: Instruct family/caregiver on patient safety     Problem: ABCDS Injury Assessment  Goal: Absence of physical injury  Outcome: Progressing  Flowsheets (Taken 1/8/2023 0039)  Absence of Physical Injury: Implement safety measures based on patient assessment

## 2023-01-08 NOTE — PROGRESS NOTES
PULMONARY & CRITICAL CARE MEDICINE PROGRESS  NOTE     Patient:  Sidra Apley  MRN: [id number]  Admit date: 1/1/2023  Primary Care Physician: Renetta Younger MD  Consulting Physician: Jayson Schroeder DO  CODE Status: Full Code  LOS: 7    SUBJECTIVE     I personally interviewed/examined the patient, reviewed interval history and interpreted all available radiographic, laboratory data at the time of service. Chief Compliant/Reason for Initial Consult:   Acute hypoxic hypercapnic respiratory failure on chronic hypoxic respiratory failure/pneumonia    Brief Hospital Course: The patient is a 47 y.o. female she has history of COPD/chronic hypoxic respiratory failure on nocturnal O2/history of chronic pancreatitis/depression and generalized anxiety disorder (per chart)/history of alcohol use/hypertension. Brought to the emergency room with altered mental status severe hypoxia initially treated with nonrebreather was apparently agitated and combative in the emergency room slightly improved initial CT scan was negative for pulm embolism but shows bilateral areas of groundglass changes diffusely concern for aspiration. Patient was admitted to ICU initially was on BiPAP high flow but ultimately requiring intubation on 01/04/2023 requiring sedation/apparently was on CIWA protocol and treated with Zosyn for bilateral pneumonia chest x-ray shows right lower lobe atelectasis/consolidation/infiltrate and lower left lower lobe infiltrate. Patient ultimately was weaned down on sedation arousable and was extubated on 01/07/2023 and was transferred out of ICU on 01/07/2023. Interval History:  01/08/23  ICU events noted, critical care notes history and physical progress note imaging studies including chest x-ray CT scan and other labs and blood gases seen. Patient has been hemodynamically stable systolic blood pressure usually above 100.   She is afebrile and T-max of 98.8 in last 24 hours she is on 3 L nasal cannula and maintaining saturation 96%.  When I saw the patient she does have cough cough with associated with a sputum production she also complains of fluid in her throat.  The sputum is whitish to clear according to patient she does not complain of fever and chest pain or hemoptysis.  Denies sore throat or dysphagia.  Does not complain of wheezing.    She is currently on Zosyn.  Labs shows BUN 15 creatinine 0.40 bicarbonate 23 WBC 12.3 hemoglobin 10.6 and platelet count 464  Last ABG on 01/07/2023 was 7.4 0/45/103/28.    Chest x-ray on 01/07/2023 shows better aeration of lower lobe especially right lower lobe atelectasis was better as compared to chest x-ray on 01/04/2023 and right lower lobe was present and likely bilateral small pleural effusion present.    Review of Systems:  Review of Systems   Constitutional:  Positive for fatigue. Negative for appetite change and unexpected weight change.   HENT:  Negative for postnasal drip, sore throat, trouble swallowing and voice change.    Eyes:  Negative for photophobia, redness and visual disturbance.   Respiratory:  Positive for cough and shortness of breath. Negative for wheezing.    Cardiovascular:  Negative for chest pain and leg swelling.   Gastrointestinal:  Negative for abdominal pain, diarrhea and vomiting.   Endocrine: Negative for polydipsia, polyphagia and polyuria.   Genitourinary:  Negative for difficulty urinating, dysuria, frequency and hematuria.   Musculoskeletal: Negative.    Allergic/Immunologic: Negative.    Neurological:  Negative for dizziness, tremors, speech difficulty and headaches.   Hematological:  Negative for adenopathy. Does not bruise/bleed easily.   Psychiatric/Behavioral: Negative.       OBJECTIVE     VITAL SIGNS:   LAST-  /87   Pulse 82   Temp 97.9 °F (36.6 °C) (Oral)   Resp 16   Ht 5' 6\" (1.676 m)   Wt 134 lb 0.6 oz (60.8 kg)   SpO2 96%   BMI 21.63 kg/m²  8-24 HR RANGE-  TEMP Temp  Av.5 °F (36.9 °C)  Min: 97.9 °F (36.6 °C)  Max: 99 °F (42.8 °C)   BP Systolic (89AMH), EQY:057 , Min:87 , MQI:538      Diastolic (17HKK), RFO:10, Min:34, Max:98     PULSE Pulse  Av.6  Min: 76  Max: 90   RR No data recorded   O2 SAT SpO2  Av %  Min: 96 %  Max: 96 %   OXYGEN DELIVERY No data recorded     Systemic Examination:   Physical Exam  General appearance - looks comfortable and in no acute distress mildly tachypneic  Mental status - alert, oriented to person, place, and time  Eyes - pupils equal and reactive, extraocular eye movements intact  Mouth - mucous membranes moist, pharynx normal without lesions  Neck - supple, no significant adenopathy Short neck  Chest - Chest was symmetrical without dullness to percussion. Breath sounds bilaterally distant and decreased at bases crackles present at bases no expiratory wheezing and no rhonchi.   There is no intercostal recession or use of accessory muscles  Heart - normal rate, regular rhythm, normal S1, S2, no murmurs, rubs, clicks or gallops  Abdomen - soft, nontender, nondistended, no masses or organomegaly  Neurological - alert, oriented, normal speech, no focal findings or movement disorder noted  Extremities - peripheral pulses normal, trace pedal edema, no clubbing or cyanosis  Skin - normal coloration and turgor, no rashes, no suspicious skin lesions noted     DATA REVIEW     Medications:  Scheduled Meds:   predniSONE  40 mg Oral Daily    Followed by    Tonja Bronson ON 2023] predniSONE  30 mg Oral Daily    Followed by    Tonja Bronson ON 2023] predniSONE  20 mg Oral Daily    Followed by    Tonja Bronson ON 2023] predniSONE  10 mg Oral Daily    prazosin  1 mg Oral Nightly    pantoprazole  40 mg Oral Nightly    carBAMazepine  100 mg Oral BID    topiramate  25 mg Oral BID    ipratropium-albuterol  1 ampule Inhalation Q4H WA    piperacillin-tazobactam  3,375 mg IntraVENous Q8H    atorvastatin  20 mg Oral Daily    busPIRone 30 mg Oral BID    Pancrelipase (Lip-Prot-Amyl)  40,000 Units Oral TID     mirtazapine  15 mg Oral Nightly    pregabalin  200 mg Oral Nightly    rOPINIRole  1 mg Oral Nightly    sucralfate  1 g Oral 4x Daily    sodium chloride flush  5-40 mL IntraVENous 2 times per day    enoxaparin  40 mg SubCUTAneous Daily    sodium chloride flush  5-40 mL IntraVENous 2 times per day    thiamine  100 mg Oral Daily    magnesium sulfate  2,000 mg IntraVENous Once     Continuous Infusions:   sodium chloride 10 mL/hr at 01/06/23 0422    sodium chloride 20 mL/hr at 01/08/23 0035     LABS:-  ABG:   Recent Labs     01/06/23  0429 01/07/23  0438   POCPH 7.410 7.401   POCPCO2 48.6* 45.4   POCPO2 99.5 103.9   POCHCO3 30.8* 28.2*   BUHS5APH 98 98     CBC:   Recent Labs     01/06/23  0752 01/07/23  0456 01/08/23 0458   WBC 9.8 12.1* 12.3*   HGB 11.5* 11.7* 10.6*   HCT 36.0* 36.8 33.5*   MCV 89.1 90.4 90.1    434 464*   LYMPHOPCT 27 20* 32   RBC 4.04 4.07 3.72*   MCH 28.5 28.7 28.5   MCHC 31.9 31.8 31.6   RDW 15.4* 15.5* 15.6*     BMP:   Recent Labs     01/06/23  0443 01/07/23 0456 01/08/23 0458    137 141   K 3.8 3.7 3.7    101 106   CO2 28 26 23   BUN 23* 22* 15   CREATININE 0.26* 0.29* 0.40*   GLUCOSE 152* 173* 149*     Liver Function Test:   No results for input(s): PROT, LABALBU, ALT, AST, GGT, ALKPHOS, BILITOT in the last 72 hours. Amylase/Lipase:  No results for input(s): AMYLASE, LIPASE in the last 72 hours. Coagulation Profile:   No results for input(s): INR, PROTIME, APTT in the last 72 hours. Cardiac Enzymes:  No results for input(s): CKTOTAL, CKMB, CKMBINDEX, TROPONINI in the last 72 hours.   Lactic Acid:  No results found for: LACTA  BNP:   No results found for: BNP  D-Dimer:  Lab Results   Component Value Date    DDIMER 2.17 12/15/2018     Others:   Lab Results   Component Value Date    TSH 1.40 01/02/2023     Lab Results   Component Value Date    [place] NEGATIVE 04/13/2021    SEDRATE 37 (H) 02/03/2021 .7 (H) 07/27/2021     No results found for: [place] Natalee  Lab Results   Component Value Date    IRON 35 (L) 01/08/2023    TIBC 242 (L) 01/08/2023    FERRITIN 182 (H) 01/08/2023     No results found for: SPEP, UPEP  Lab Results   Component Value Date/Time     21 07/21/2021 04:00 PM       Input/Output:    Intake/Output Summary (Last 24 hours) at 1/8/2023 1222  Last data filed at 1/7/2023 1837  Gross per 24 hour   Intake 768.65 ml   Output --   Net 768.65 ml       Microbiology:  No results for input(s): SPECDESC, SPECDESC, SPECIAL, CULTURE, CULTURE, STATUS, ORG, CDIFFTOXPCR, CAMPYLOBPCR, SALMONELLAPC, SHIGAPCR, SHIGELLAPCR, MPNEUG, MPNEUM, LACTOQL in the last 72 hours. Pathology:    Radiology reports:  XR CHEST PORTABLE   Final Result   Improved aeration of the lung bases. XR CHEST PORTABLE   Final Result   Endotracheal tube tip is approximately 4 cm above the roma. OG tube tip is   in the stomach. XR CHEST (SINGLE VIEW FRONTAL)   Final Result   Small right pleural effusion with bibasilar airspace opacities. This could   represent atelectasis versus pneumonia         US GALLBLADDER RUQ   Final Result   Overall findings suggestive of diffuse hepatic parenchymal disease with   evidence to indicate underlying cirrhosis. Trace pericholecystic fluid is nonspecific and could be related to underlying   diffuse hepatic disease/cirrhosis. Otherwise, no other sonographic findings   to indicate the presence of acute cholecystitis. CT CHEST PULMONARY EMBOLISM W CONTRAST   Final Result   1. No evidence of pulmonary embolism. 2. Hazy vascular appearance with diffuse ground-glass background parenchymal   pattern with mosaic type distribution suggesting pulmonary venous congestion. Additionally, there is venous enhancement of the liver suggesting secondary   congestive change. Overall appearance suggest pulmonary edema. 3. Fatty liver.   Although incompletely included in the field of view suspect   hepatomegaly. 4. Pancreatic calcifications suggesting chronic pancreatitis. 5. Enlarged right hilar lymph node. CT ABDOMEN PELVIS W IV CONTRAST Additional Contrast? None   Final Result   Findings suggesting stable subacute to chronic superior endplate compression   fracture of L1 with approximately 20% loss of vertebral body height. This is   seen on prior chest CT. Mild acute inferior endplate compression fracture of L2 without significant   loss of vertebral body height. This is new since prior 09/09/2021 CT but is   age indeterminate. Given history of recent trauma, these may be acute. Distended gallbladder with wall thickening and pericholecystic fluid. This   is nonspecific but can be seen with acute cholecystitis in the proper   clinical setting. Incidentally noted nonobstructing stone in the mid pole left kidney. Evidence of remote pancreatitis. Findings suggesting atypical pneumonia in the lung bases. Please see   separate chest CT. CT THORACIC SPINE TRAUMA RECONSTRUCTION   Final Result   Chronic T8, T9, T11 and T12 compression fractures. Chronic burst compression   fracture of T6. No significant change since prior chest CT. No new   fractures of the thoracic spine. CT LUMBAR SPINE TRAUMA RECONSTRUCTION   Final Result   Findings suggesting stable subacute to chronic superior endplate compression   fracture of L1 with approximately 20% loss of vertebral body height. This is   seen on prior chest CT. Mild acute inferior endplate compression fracture of L2 without significant   loss of vertebral body height. This is new since prior 09/09/2021 CT but is   age indeterminate. Given history of recent trauma, these may be acute. Distended gallbladder with wall thickening and pericholecystic fluid. This   is nonspecific but can be seen with acute cholecystitis in the proper   clinical setting.       Incidentally noted nonobstructing stone in the mid pole left kidney. Evidence of remote pancreatitis. Findings suggesting atypical pneumonia in the lung bases. Please see   separate chest CT. CT HEAD WO CONTRAST   Final Result   No acute intracranial abnormality. CT CERVICAL SPINE WO CONTRAST   Final Result   No acute traumatic injury of the cervical spine. XR CHEST PORTABLE   Final Result   Diffuse infiltrates with differential diagnosis to include multifocal   pneumonitis versus interstitial edema. Echocardiogram:   No results found for this or any previous visit. Cardiac Catheterization:   No results found for this or any previous visit. ASSESSMENT AND PLAN     Assessment:    //Acute hypoxic respiratory failure  //Chronic hypoxic respiratory failure on home O2 usually use it at night  //COPD severity not known  //Tobacco dependence more than 30-pack-year history of smoking  //Bilateral pneumonia possible aspiration  //Right lower lobe atelectasis improve  //Bilateral small pleural effusion  //History of hypertension  //History of chronic pancreatitis  //History of seizure    Plan:    Patient remains hemodynamically stable and is currently saturating above 92% on nasal cannula 3 L. Patient is on home O2 apparently 3 L but use only at night  Continue supplemental oxygen to keep oxygen saturation greater than 92%  Will use BiPAP/NIV as needed  Avoid sedation and narcotics. Encourage incentive spirometry deep breathing and cough. Continue with bronchodilators with DuoNeb aerosol. Patient take Trelegy at home at the time of discharge DuoNeb can be changed to Trelegy  On Minipress, atorvastatin and amlodipine.   Continue to monitor I/O with a goal of even/negative fluid balance  Antimicrobials reviewed; continue Zosyn  On prednisone taper dose will continue  DVT prophylaxis on Lovenox  Physical/occupational/speech therapy; increase activity as tolerated    Discussed with nursing staff, treatment plan discussed. I updated the patient regarding the current clinical condition, provisional diagnosis and management plan. I addressed concerns and answered all questions to the best of my abilities. It was my pleasure to evaluate Claudetta Speed today. We will continue to follow. I would like to thank you for allowing me to participate in the care of this patient. Please feel free to call with any further questions or concerns. Octavio Mayorga MD, M.D. Pulmonary and Critical Care Medicine           1/8/2023, 12:22 PM    Please note that this chart was generated using voice recognition Dragon dictation software. Although every effort was made to ensure the accuracy of this automated transcription, some errors in transcription may have occurred.

## 2023-01-08 NOTE — PROGRESS NOTES
Occupational Therapy  Facility/Department: [facility]  Occupational Therapy Initial Assessment    Name: Trey Wyatt  : 1970  MRN: [id number]  Date of Service: 2023    Discharge Recommendations:   Continue to assess  No therapy recommended at discharge. Patient Diagnosis(es): The primary encounter diagnosis was Hypoxia. Diagnoses of Altered mental status, unspecified altered mental status type, Pneumonia of both lungs due to infectious organism, unspecified part of lung, and Acute on chronic respiratory failure with hypoxia ([address] were also pertinent to this visit. Past Medical History:  has a past medical history of Acute respiratory failure with hypoxia ([address] Alcohol withdrawal syndrome, with delirium ([address] Alcoholism ([address] Anal warts, Anemia, Anxiety, 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24, the patient underwent 2 surgical resections without known recurrence, Bandemia, Closed fracture of lateral portion of left tibial plateau, Condyloma, COPD (chronic obstructive pulmonary disease) ([address] Depression, Dysphagia, GI bleed, Hypertension, Memory loss, Oxygen dependent, Pain, joint, ankle and foot, Pancreatic lesion, Perianal wart, Peripheral neuropathy, Seizures ([address] Tension headache, Under care of team, Under care of team, Under care of team, Under care of team, Under care of team, Under care of team, Wears glasses, Wears partial dentures, and Wellness examination. Past Surgical History:  has a past surgical history that includes Hysterectomy (); Brain tumor excision (); fracture surgery (Left, 2013); fracture surgery (Right); Upper gastrointestinal endoscopy (N/A, 10/22/2020); Colonoscopy (N/A, 10/22/2020); Endoscopic ultrasonography, GI (N/A, 2020); Upper gastrointestinal endoscopy (N/A, 2021); Hand surgery; CT ABSCESS DRAINAGE (2021); ERCP (N/A, 2021); ERCP (2021); Upper gastrointestinal endoscopy (N/A, 2021);  Spine surgery (N/A, 09/10/2021); ERCP (N/A, 10/21/2021); ERCP (10/21/2021); ERCP (10/21/2021); Colonoscopy (N/A, 11/19/2021); Anus surgery (N/A, 01/25/2022); endoscopic ultrasound (upper) (02/09/2022); Upper gastrointestinal endoscopy (N/A, 2/9/2022); and Upper gastrointestinal endoscopy (2/9/2022). Assessment   Performance deficits / Impairments: Decreased functional mobility ; Decreased ADL status; Decreased high-level IADLs;Decreased endurance;Decreased safe awareness;Decreased balance  Assessment: pt would benefit from further acute OT in order to address decreased endurance and balance in order to safely and independently completes ADLs and functional transfers/functional mobility. Prognosis: Good  Decision Making: Medium Complexity  REQUIRES OT FOLLOW-UP: Yes  Activity Tolerance  Activity Tolerance: Patient Tolerated treatment well        Plan   Occupational Therapy Plan  Times Per Week: 2-3x/wk     Restrictions  Restrictions/Precautions  Restrictions/Precautions: Fall Risk  Required Braces or Orthoses?: No  Position Activity Restriction  Other position/activity restrictions: up with assist    Subjective   General  Patient assessed for rehabilitation services?: Yes  Family / Caregiver Present: No  General Comment  Comments: RN ok'd for therapy this morning. pt agreeable to participate in session and cooperative/pleasant throughout.  pt reported 6/10 back and B LE pain, pt able to continue with session     Social/Functional History  Social/Functional History  Lives With: Alone  Type of Home: House  Home Layout: One level  Home Access: Stairs to enter without rails  Entrance Stairs - Number of Steps: 1  Bathroom Shower/Tub: Tub/Shower unit  Bathroom Toilet: Standard  Bathroom Equipment: Shower chair  Home Equipment: Vaca Sames, rolling, Cane, Oxygen (pt reported no use of DME At baseline and being on 3L home O2)  Receives Help From: Family  ADL Assistance: [address]: [address] [place] [address] Responsibilities: Yes  Meal Prep Responsibility: Primary  Laundry Responsibility: Primary  Cleaning Responsibility: Primary  Ambulation Assistance: Independent  Transfer Assistance: Independent  Active : No  Patient's  Info: med cab  Mode of Transportation: Family  Occupation: On disability  Leisure & Hobbies: reading, stain glass  Additional Comments: pt reported supportive mom and friend able to assist PRN       Objective                Safety Devices  Type of Devices: Call light within reach; Left in bed;Gait belt  Restraints  Restraints Initially in Place: No    Bed Mobility Training  Bed Mobility Training: Yes  Overall Level of Assistance: Supervision  Supine to Sit: Supervision  Sit to Supine: Supervision  Scooting: Supervision  Balance  Sitting: Intact (~10 minutes on eOB)  Standing: Intact (~2 minutes. pt completed functional mobility to/from nurses station in hallway with CGA while holding onto IV pole, pt also held onto hand railings on wall for extra support. pt reported dizziness during functionanl mobility)  Transfer Training  Transfer Training: Yes  Overall Level of Assistance: Contact-guard assistance  Sit to Stand: Contact-guard assistance  Stand to Sit: Contact-guard assistance  Gait  Overall Level of Assistance: Contact-guard assistance       AROM: Within functional limits  Strength:  Within functional limits  Coordination: Within functional limits  Tone: Normal  Sensation: Impaired (pt reported N/T to B hands and feet)    ADL  Feeding: Independent  Grooming: Modified independent   UE Bathing: Stand by assistance  LE Bathing: Contact guard assistance  UE Dressing: Stand by assistance  LE Dressing: Contact guard assistance  Toileting: Contact guard assistance                Vision  Vision: Impaired  Vision Exceptions: Wears glasses at all times  Hearing  Hearing: Within functional limits    Cognition  Overall Cognitive Status: Exceptions  Safety Judgement: Decreased awareness of need for assistance;Decreased awareness of need for safety  Insights: Decreased awareness of deficits  Orientation  Overall Orientation Status: Within Functional Limits                    Education Given To: Patient  Education Provided: Role of Therapy;Plan of Care;Transfer Training  Education Method: Verbal  Barriers to Learning: None  Education Outcome: Verbalized understanding    LUE AROM (degrees)  LUE AROM : WFL  Left Hand AROM (degrees)  Left Hand AROM: WFL  RUE AROM (degrees)  RUE AROM : WFL  Right Hand AROM (degrees)  Right Hand AROM: [facility]                                                                     AM-PAC Score        AM-PAC Inpatient Daily Activity Raw Score: 20 (01/08/23 1433)  AM-PAC Inpatient ADL T-Scale Score : 42.03 (01/08/23 1433)  ADL Inpatient CMS 0-100% Score: 38.32 (01/08/23 1433)  ADL Inpatient CMS G-Code Modifier : CJ (01/08/23 1433)         Goals  Short Term Goals  Time Frame for Short Term Goals: pt will, by discharge  Short Term Goal 1: complete LB ADLs and toileting tasks with SBA and set up  Short Term Goal 2: complete UB ADLs with mod I  Short Term Goal 3: dem good safety awareness during functional transfers/functional mobility  Short Term Goal 4: dem supervision during functional transfers/functional mobility with LRd, as needed  Short Term Goal 5: dem ~6 minutes dynamic standing tolerance with supervision in order to complete functional tasks       Therapy Time   Individual Concurrent Group Co-treatment   Time In [address] [place] [address]         Time Out 0805         Minutes 14         Timed Code Treatment Minutes: Marquita Rodríguez 27, OTR/L

## 2023-01-08 NOTE — PROGRESS NOTES
[facility]  Office: [phone]  Juanito Jorgensen DO, Murray Salgado DO, Neno Waters DO, Stuart Huang DO, Karolina Prakash MD, Diane Cali MD, Antoine Camacho MD, Rosemary Fish MD,  Venkat De La Garza MD, Jeremias Toth MD, Zachary Lozano DO, Arpita Enciso MD,  Manuelito Roberson DO, Rowan Cancino MD, Westley Marc MD, Gilberto Jorgensen DO, Mary Aparicio MD, Jean Marie Carr MD, Jagdeep Chairez DO, Myriam Myers MD, Tika Sesay MD, Jaky Villa MD, Мария Germain MD, Valentin Monroy DO, Flori Woodson MD, Alycia Diamond MD, Shirley Waterhouse, CNP,  Gi Talley, CNP, Jessica Iyer, CNP, Duke Zavala, CNP,  Irene Boss, [facility], Suyapa Cohen, CNP, Ava Lerma, CNP, Jo Ann Gamboa, CNP, Valery Graves, CNP, Margy Fernandes, CNP, NICOLLE RenteriaC, Francesca Mcmullen, CNS, Carmen Swartz, CNP, Iliana Alvarez, CNP         [facility]   IN-PATIENT SERVICE   [facility]    Progress Note    1/8/2023    3:33 PM    Name:   Barbara White  MRN:     [id number]     Acct:      [id number]   Room:   [facility] Day:  7  Admit Date:  1/1/2023 12:55 PM    PCP:   LIZBETH CAIN MD  Code Status:  Full Code    Subjective:     C/C:   Chief Complaint   Patient presents with    Fatigue    Fall    Respiratory Distress     Interval History Status: significantly improved.     Pt feeling better today  Still having some anxiety but mentation is clear  Her family is present at bedside.     Her breathing is not back to baseline yet. She is still having some coughing. Otherwise scant wheezing. No other complaints    Brief History:     51-year-old female Mrs. Brice with past medical history significant for chronic pancreatitis, polysubstance abuse, ethanol, major depressive disorder with multiple psych medication came into the ED with altered mental status and desatted to 42%.  Lab in the ED was remarkable for leukocytosis 17.5 and UA concerning for UTI.  Trauma work-up was negative CT  pulmonary was also negative but did demonstrate diffuse groundglass opacities with mosaic type distribution. Patient was transferred to the ICU for further respiratory support. And was put on alternating BiPAP with high flow nasal cannula. Cardio was consulted in the ICU for uptrending troponin which eventually plateaued and echo was done which was unremarkable for any ischemic changes. But on 1/4/2023, patient became altered and was in the setting AVM in the BiPAP with 100% FiO2. A decision was made to intubate the patient and put him on ventilator. Patient was put on IV Zosyn for suspected pneumonia. Currently on Zosyn. Subsequently patient respiratory status progressively improved. On 1/6/2023, patient CPAP well and was ready for extubation. On 1/7/2023, patient was extubated and was ready to be transferred. Currently she is on 3 L of nasal cannula oxygen    Review of Systems:     Constitutional:  negative for chills, fevers, sweats  Respiratory:  +cough, dyspnea on exertion, shortness of breath, wheezing  Cardiovascular:  negative for chest pain, chest pressure/discomfort, lower extremity edema, palpitations  Gastrointestinal:  negative for abdominal pain, constipation, diarrhea, nausea, vomiting  Neurological:  negative for dizziness, headache +anxiety    Medications:      Allergies:  No Known Allergies    Current Meds:   Scheduled Meds:    amLODIPine  10 mg Oral Daily    folic acid  1 mg Oral Daily    metoclopramide  5 mg Oral TID AC    nicotine  1 patch TransDERmal Daily    sodium chloride  1 g Oral TID WC    magnesium sulfate  2,000 mg IntraVENous Once    predniSONE  40 mg Oral Daily    Followed by    Gissel Jo ON 1/11/2023] predniSONE  30 mg Oral Daily    Followed by    Gissel Jo ON 1/14/2023] predniSONE  20 mg Oral Daily    Followed by    Gissel Jo ON 1/17/2023] predniSONE  10 mg Oral Daily    prazosin  1 mg Oral Nightly    pantoprazole  40 mg Oral Nightly    carBAMazepine  100 mg Oral BID    topiramate  25 mg Oral BID    [Held by provider] ipratropium-albuterol  1 ampule Inhalation Q4H WA    piperacillin-tazobactam  3,375 mg IntraVENous Q8H    atorvastatin  20 mg Oral Daily    busPIRone  30 mg Oral BID    Pancrelipase (Lip-Prot-Amyl)  40,000 Units Oral TID     mirtazapine  15 mg Oral Nightly    pregabalin  200 mg Oral Nightly    rOPINIRole  1 mg Oral Nightly    sucralfate  1 g Oral 4x Daily    sodium chloride flush  5-40 mL IntraVENous 2 times per day    enoxaparin  40 mg SubCUTAneous Daily    sodium chloride flush  5-40 mL IntraVENous 2 times per day    thiamine  100 mg Oral Daily    magnesium sulfate  2,000 mg IntraVENous Once     Continuous Infusions:    sodium chloride 10 mL/hr at 01/06/23 0422    sodium chloride 20 mL/hr at 01/08/23 0035     PRN Meds: baclofen, nicotine polacrilex, lidocaine viscous hcl, fentanNYL, hydrOXYzine HCl, sodium chloride flush, sodium chloride, polyethylene glycol, acetaminophen **OR** acetaminophen, potassium chloride **OR** potassium alternative oral replacement **OR** potassium chloride, magnesium sulfate, sodium chloride flush, sodium chloride    Data:     Past Medical History:   has a past medical history of Acute respiratory failure with hypoxia ([facility]), Alcohol withdrawal syndrome, with delirium ([facility]), Alcoholism (HCC), Anal warts, Anemia, Anxiety, Astrocytoma ([facility]) - diagnosed at age 17, the patient underwent 2 surgical resections without known recurrence, Bandemia, Closed fracture of lateral portion of left tibial plateau, Condyloma, COPD (chronic obstructive pulmonary disease) (HCC), Depression, Dysphagia, GI bleed, Hypertension, Memory loss, Oxygen dependent, Pain, joint, ankle and foot, Pancreatic lesion, Perianal wart, Peripheral neuropathy, Seizures ([facility]), Tension headache, Under care of team, Under care of team, Under care of team, Under care of team, Under care of team, Under care of team, Wears glasses, Wears partial dentures, and Wellness examination.    Social  History:   reports that she has been smoking cigarettes. She has a 30.00 pack-year smoking history. She has never used smokeless tobacco. She reports that she does not currently use alcohol. She reports current drug use. Frequency: 2.00 times per week. Family History:   Family History   Problem Relation Age of Onset    Other Father     Cancer Maternal Grandmother     Heart Disease Paternal Grandmother     Esophageal Cancer Maternal Aunt     Arthritis Mother        Vitals:  /87   Pulse 87   Temp 97.9 °F (36.6 °C) (Oral)   Resp 20   Ht 5' 6\" (1.676 m)   Wt 134 lb 0.6 oz (60.8 kg)   SpO2 96%   BMI 21.63 kg/m²   Temp (24hrs), Av.5 °F (36.9 °C), Min:97.9 °F (36.6 °C), Max:99 °F (37.2 °C)    Recent Labs     23  04223  0438 23  1214   POCGLU 159* 186* 162*       I/O (24Hr):     Intake/Output Summary (Last 24 hours) at 2023 1533  Last data filed at 2023 1837  Gross per 24 hour   Intake 720 ml   Output --   Net 720 ml       Labs:  Hematology:  Recent Labs     23  07523  0456 23  045   WBC 9.8 12.1* 12.3*   RBC 4.04 4.07 3.72*   HGB 11.5* 11.7* 10.6*   HCT 36.0* 36.8 33.5*   MCV 89.1 90.4 90.1   MCH 28.5 28.7 28.5   MCHC 31.9 31.8 31.6   RDW 15.4* 15.5* 15.6*    434 464*   MPV 10.5 10.6 10.6     Chemistry:  Recent Labs     23  0443 23  0456 23  045    137 141   K 3.8 3.7 3.7    101 106   CO2 28 26 23   GLUCOSE 152* 173* 149*   BUN 23* 22* 15   CREATININE 0.26* 0.29* 0.40*   MG 1.6  --   --    ANIONGAP 10 10 12   LABGLOM >60 >60 >60   CALCIUM 8.9 9.1 8.4*     Recent Labs     23  0429 23  0438 23  1214   POCGLU 159* 186* 162*     ABG:  Lab Results   Component Value Date/Time    POCPH 7.401 2023 04:38 AM    POCPCO2 45.4 2023 04:38 AM    POCPO2 103.9 2023 04:38 AM    POCHCO3 28.2 2023 04:38 AM    NBEA 8 2023 05:19 PM    PBEA 3 2023 04:38 AM    DFW3DTF NOT REPORTED 07/26/2021 03:38 PM    CUZB1KYH 98 01/07/2023 04:38 AM    FIO2 40.0 01/07/2023 04:38 AM     Lab Results   Component Value Date/Time    SPECIAL ac 10ml 01/01/2023 01:06 PM     Lab Results   Component Value Date/Time    CULTURE NORMAL RESPIRATORY SUSIE MODERATE GROWTH 01/04/2023 05:18 PM       Radiology:  XR CHEST (SINGLE VIEW FRONTAL)    Result Date: 1/4/2023  Small right pleural effusion with bibasilar airspace opacities. This could represent atelectasis versus pneumonia     CT HEAD WO CONTRAST    Result Date: 1/1/2023  No acute intracranial abnormality. CT CERVICAL SPINE WO CONTRAST    Result Date: 1/1/2023  No acute traumatic injury of the cervical spine. CT ABDOMEN PELVIS W IV CONTRAST Additional Contrast? None    Result Date: 1/1/2023  Findings suggesting stable subacute to chronic superior endplate compression fracture of L1 with approximately 20% loss of vertebral body height. This is seen on prior chest CT. Mild acute inferior endplate compression fracture of L2 without significant loss of vertebral body height. This is new since prior 09/09/2021 CT but is age indeterminate. Given history of recent trauma, these may be acute. Distended gallbladder with wall thickening and pericholecystic fluid. This is nonspecific but can be seen with acute cholecystitis in the proper clinical setting. Incidentally noted nonobstructing stone in the mid pole left kidney. Evidence of remote pancreatitis. Findings suggesting atypical pneumonia in the lung bases. Please see separate chest CT.     US GALLBLADDER RUQ    Result Date: 1/2/2023  Overall findings suggestive of diffuse hepatic parenchymal disease with evidence to indicate underlying cirrhosis. Trace pericholecystic fluid is nonspecific and could be related to underlying diffuse hepatic disease/cirrhosis. Otherwise, no other sonographic findings to indicate the presence of acute cholecystitis.      XR CHEST PORTABLE    Result Date: 1/5/2023  Improved aeration of the lung bases. XR CHEST PORTABLE    Result Date: 1/4/2023  Endotracheal tube tip is approximately 4 cm above the roma. OG tube tip is in the stomach. XR CHEST PORTABLE    Result Date: 1/1/2023  Diffuse infiltrates with differential diagnosis to include multifocal pneumonitis versus interstitial edema. CT CHEST PULMONARY EMBOLISM W CONTRAST    Result Date: 1/1/2023  1. No evidence of pulmonary embolism. 2. Hazy vascular appearance with diffuse ground-glass background parenchymal pattern with mosaic type distribution suggesting pulmonary venous congestion. Additionally, there is venous enhancement of the liver suggesting secondary congestive change. Overall appearance suggest pulmonary edema. 3. Fatty liver. Although incompletely included in the field of view suspect hepatomegaly. 4. Pancreatic calcifications suggesting chronic pancreatitis. 5. Enlarged right hilar lymph node. CT LUMBAR SPINE TRAUMA RECONSTRUCTION    Result Date: 1/1/2023  Findings suggesting stable subacute to chronic superior endplate compression fracture of L1 with approximately 20% loss of vertebral body height. This is seen on prior chest CT. Mild acute inferior endplate compression fracture of L2 without significant loss of vertebral body height. This is new since prior 09/09/2021 CT but is age indeterminate. Given history of recent trauma, these may be acute. Distended gallbladder with wall thickening and pericholecystic fluid. This is nonspecific but can be seen with acute cholecystitis in the proper clinical setting. Incidentally noted nonobstructing stone in the mid pole left kidney. Evidence of remote pancreatitis. Findings suggesting atypical pneumonia in the lung bases. Please see separate chest CT. CT THORACIC SPINE TRAUMA RECONSTRUCTION    Result Date: 1/1/2023  Chronic T8, T9, T11 and T12 compression fractures. Chronic burst compression fracture of T6.   No significant change since prior chest CT.  No new fractures of the thoracic spine. Physical Examination:        General appearance:  alert, cooperative and no distress on 4 liters of oxygen nasal cannula   Mental Status:  oriented to person, place and time and normal affect +anxiety  Lungs:  wheezing and decreased breath sounds  to auscultation bilaterally, normal effort on 4 liters of oxygen   Heart:  regular rate and rhythm, no murmur  Abdomen:  soft, nontender, nondistended, normal bowel sounds, no masses, hepatomegaly, splenomegaly  Extremities:  no edema, redness, tenderness in the calves  Skin:  no gross lesions, rashes, induration    Assessment:        Hospital Problems             Last Modified POA    * (Principal) Acute on chronic respiratory failure with hypoxia (HCC) 9/5/4198 Yes    Metabolic encephalopathy 1/0/1277 Yes    Nicotine addiction 1/8/2023 Yes    COPD exacerbation ([facility] 1/8/2023 Yes    Chronic pancreatitis ([facility] 1/8/2023 Yes    Community acquired pneumonia of left lower lobe of lung 1/8/2023 Yes    Moderate episode of recurrent major depressive disorder ([facility] 1/8/2023 Yes       Plan:        Acute on chronic hypoxic and hypercapnic respiratory failure requiring Ventilator support due to pneumonia/COPD exacerbation: on 4 liters of NC at home. Continue steroid taper, Zosyn, Duo neb, mucin ex. Transition to PO in am if tolerates. Chronic pancreatitis: Zenpep TID with meals  Metabolic encephalopathy: returning to baseline  NCNC anemia: Hgb 10. 6. check iron studies. Anxiety and depression: Continue Buspar, Carbamazepine, remeron  Leukocytosis: likely due to steroid use. Stop metoprolol, EF is normal and concern that this can aggravate her COPD  Will give scheduled and as needed nicotine   At home she takes Reglan, Topamax, carbamazepine, BuSpar, Atarax and Remeron as needed. Long discussion with patient regarding psych medications.  Explained side effects including Qtc prolongation/arrhythmia with multiple QTC prolonging drugs and, extraparametal side effects with Reglan/antipsychotic use. At this time, patient says she has been on this for several years and would like to continue.   She has severe anxiety and her medications have been titrated by psychiatrist    Oswald Babin DO  1/8/2023  3:33 PM

## 2023-01-08 NOTE — PROGRESS NOTES
[facility]   Progress Note                   Date:   1/8/2023  Patient name: Cj Hernandez  Date of admission:  1/1/2023 12:55 PM  MRN:   [id number]  YOB: 1970  PCP: Uziel Paniagua MD    Reason for Admission: Acute hypoxic respiratory failure    Subjective:       Pt seen and examined in the room. Pt extubated. Pt laying flat in bed with no distress. Her only complaint is leg pain today.      Medications:   Scheduled Meds:   predniSONE  40 mg Oral Daily    Followed by    Douglas Reyes ON 1/11/2023] predniSONE  30 mg Oral Daily    Followed by    Douglsa Reyes ON 1/14/2023] predniSONE  20 mg Oral Daily    Followed by    Douglas Reyes ON 1/17/2023] predniSONE  10 mg Oral Daily    prazosin  1 mg Oral Nightly    pantoprazole  40 mg Oral Nightly    metoprolol tartrate  12.5 mg Oral BID    carBAMazepine  100 mg Oral BID    topiramate  25 mg Oral BID    ipratropium-albuterol  1 ampule Inhalation Q4H WA    piperacillin-tazobactam  3,375 mg IntraVENous Q8H    atorvastatin  20 mg Oral Daily    busPIRone  30 mg Oral BID    Pancrelipase (Lip-Prot-Amyl)  40,000 Units Oral TID WC    mirtazapine  15 mg Oral Nightly    pregabalin  200 mg Oral Nightly    rOPINIRole  1 mg Oral Nightly    sucralfate  1 g Oral 4x Daily    sodium chloride flush  5-40 mL IntraVENous 2 times per day    enoxaparin  40 mg SubCUTAneous Daily    sodium chloride flush  5-40 mL IntraVENous 2 times per day    thiamine  100 mg Oral Daily    magnesium sulfate  2,000 mg IntraVENous Once       Continuous Infusions:   sodium chloride 10 mL/hr at 01/06/23 0422    sodium chloride 20 mL/hr at 01/08/23 0035       CBC:   Recent Labs     01/06/23  0752 01/07/23  0456 01/08/23  0458   WBC 9.8 12.1* 12.3*   HGB 11.5* 11.7* 10.6*    434 464*       BMP:    Recent Labs     01/06/23  0443 01/07/23  0456 01/08/23  0458    137 141   K 3.8 3.7 3.7    101 106   CO2 28 26 23   BUN 23* 22* 15   CREATININE 0.26* 0.29* 0.40*   GLUCOSE 152* 173* 149* Hepatic:   No results for input(s): AST, ALT, ALB, BILITOT, ALKPHOS in the last 72 hours. Troponin: No results for input(s): TROPONINI in the last 72 hours. BNP: No results for input(s): BNP in the last 72 hours. Lipids: No results for input(s): CHOL, HDL in the last 72 hours. Invalid input(s): LDLCALCU  INR: No results for input(s): INR in the last 72 hours. Objective:   Vitals: BP 96/67   Pulse 82   Temp 99 °F (37.2 °C) (Oral)   Resp 16   Ht 5' 6\" (1.676 m)   Wt 134 lb 0.6 oz (60.8 kg)   SpO2 91%   BMI 21.63 kg/m²     General appearance: Awake and follows simple commands  HEENT: Head: Normocephalic, no lesions, without obvious abnormality  Neck: no JVD  Lungs: clear to auscultation bilaterally,  Heart: Normal heart sounds  Abdomen: soft, non-tender; bowel sounds normal  Extremities: No LE edema  Neurologic: Alert and follows simple commands      EKG  1/2/23 sinus rhythm, first-degree AV block, Normal axis, T wave inversion in anterior leads, prolonged QTc     ECHO  01/01/23  Summary  Left ventricle is normal in size. Global left ventricular systolic function  is hyperdynamic. Calculated ejection fraction >65% by Guadarrama's method. Increased velocities in distal LV. Normal right ventricular size and function. No significant valvular regurgitation or stenosis seen.      Coronary angiography: Not done        Assessment:     Patient Active Problem List:     Acute bronchitis     Reflex sympathetic dystrophy of lower limb     Essential hypertension     Nicotine addiction     Psychophysiological insomnia     Anxiety     Alcoholic peripheral neuropathy (HCC)     Hypokalemia     Macrocytic anemia     Hypomagnesemia     Tobacco use     Ambulatory dysfunction     Seizure disorder (HCC)     COPD with acute exacerbation (HCC)     Paresthesia of lower extremity     Acute respiratory failure with hypoxia (HCC)     Pancreatic cyst     Recurrent major depressive disorder (HCC)     Elevated CA 19-9 level Perineal mass, female     Esophagitis candidal      Condyloma     Astrocytoma (HCC) - diagnosed at age 24, the patient underwent 2 surgical resections without known recurrence     Alcohol use disorder, severe, in early remission ([address]     Metabolic encephalopathy     AMAN (generalized anxiety disorder)     Major depressive disorder, recurrent severe without psychotic features ([address]     Esophageal dysphagia     Chronic peripheral neuropathic pain     History of alcohol abuse     History of petit-mal seizures     Intractable episodic tension-type headache     Personal history of astrocytoma     Multilevel spine pain     Chronic pain syndrome     Marijuana abuse     Severe sepsis (HCC)     Closed fracture of fifth thoracic vertebra (HCC)     Diverticulitis of large intestine with abscess without bleeding     Elevated alkaline phosphatase level     Chronic pancreatitis (HCC)     Erythema ab igne     Compression fracture of thoracic vertebra (HCC)     Pathological compression fracture of lumbar vertebra with delayed healing     Metabolic acidosis with increased anion gap and accumulation of organic acids     Community acquired pneumonia of left lower lobe of lung     Septicemia ([address]     Alcohol-induced acute pancreatitis     Fluid collection of pancreas     Diverticulitis of large intestine without perforation or abscess with bleeding     Pseudocyst of pancreas     Sepsis ([address]     Acute recurrent pancreatitis     Atelectasis of left lung     Hyponatremia     Iron deficiency anemia     Adenomatous polyp of sigmoid colon     Moderate episode of recurrent major depressive disorder (HCC)     Sleep disturbance     Intractable migraine without aura and without status migrainosus     Acute on chronic respiratory failure with hypoxia (HCC)        Treatment Plan:     IMPRESSION:    Elevated troponin likely demand ischemia in the setting of sepsis  Acute CHF exacerbation  Acute hypoxic respiratory failure, intubated  Hypertension  Anemia  COPD  MDD  Smoking history  Polysubstance abuse   chronic pancreatitis  Hypokalemia       RECOMMENDATIONS:  Echo showing EF of more than 60%  Off diuretics. Continue Lopressor 12.5 mg twice daily and Lipitor  Avoid QTC prolonging medication  Replace K to keep K greater than 4, mag greater than 2  Rest of care as per primary. Will sign off.   Please call with further questions or concerns               Olinda Vidales, APRN - CNP

## 2023-01-08 NOTE — PLAN OF CARE
Problem: Discharge Planning  Goal: Discharge to home or other facility with appropriate resources  1/8/2023 1128 by Feliberto Sabillon RN  Outcome: Progressing  1/8/2023 0903 by Marina Saenz RN  Outcome: Progressing  1/8/2023 0042 by Johanna Hartman RN  Outcome: Progressing  Flowsheets (Taken 1/7/2023 2000)  Discharge to home or other facility with appropriate resources:   Identify barriers to discharge with patient and caregiver   Arrange for needed discharge resources and transportation as appropriate     Problem: Pain  Goal: Verbalizes/displays adequate comfort level or baseline comfort level  1/8/2023 1128 by Feliberto Sabillon RN  Outcome: Progressing  1/8/2023 0903 by Marina Saenz RN  Outcome: Progressing  1/8/2023 0042 by Johanna Hartman RN  Outcome: Progressing     Problem: Skin/Tissue Integrity  Goal: Absence of new skin breakdown  Description: 1. Monitor for areas of redness and/or skin breakdown  2. Assess vascular access sites hourly  3. Every 4-6 hours minimum:  Change oxygen saturation probe site  4. Every 4-6 hours:  If on nasal continuous positive airway pressure, respiratory therapy assess nares and determine need for appliance change or resting period.   1/8/2023 1128 by Feliberto Sabillon RN  Outcome: Progressing  1/8/2023 0903 by Marina Saenz RN  Outcome: Progressing  1/8/2023 0042 by Johanna Hartman RN  Outcome: Progressing     Problem: Nutrition Deficit:  Goal: Optimize nutritional status  1/8/2023 1128 by Feliberto Sabillon RN  Outcome: Progressing  1/8/2023 0903 by Marina Saenz RN  Outcome: Progressing  1/8/2023 0042 by Johanna Hartman RN  Outcome: Progressing     Problem: Respiratory - Adult  Goal: Achieves optimal ventilation and oxygenation  1/8/2023 1128 by Feliberto Sabillon RN  Outcome: Progressing  1/8/2023 0903 by Marina Saenz RN  Outcome: Progressing  1/8/2023 0829 by Mulu Galarza RCP  Flowsheets (Taken 1/8/2023 1847)  Achieves optimal ventilation and oxygenation:   Assess for changes in respiratory status   Assess for changes in mentation and behavior   Position to facilitate oxygenation and minimize respiratory effort   Oxygen supplementation based on oxygen saturation or arterial blood gases   Initiate smoking cessation protocol as indicated   Encourage broncho-pulmonary hygiene including cough, deep breathe, incentive spirometry   Assess and instruct to report shortness of breath or any respiratory difficulty   Respiratory therapy support as indicated  1/8/2023 0042 by Evelyn Guillen RN  Outcome: Progressing  Flowsheets (Taken 1/7/2023 2000)  Achieves optimal ventilation and oxygenation: Assess for changes in respiratory status     Problem: Confusion  Goal: Confusion, delirium, dementia, or psychosis is improved or at baseline  Description: INTERVENTIONS:  1. Assess for possible contributors to thought disturbance, including medications, impaired vision or hearing, underlying metabolic abnormalities, dehydration, psychiatric diagnoses, and notify attending LIP  2. [place] high risk fall precautions, as indicated  3. Provide frequent short contacts to provide reality reorientation, refocusing and direction  4. Decrease environmental stimuli, including noise as appropriate  5. Monitor and intervene to maintain adequate nutrition, hydration, elimination, sleep and activity  6. If unable to ensure safety without constant attention obtain sitter and review sitter guidelines with assigned personnel  7.  Initiate Psychosocial CNS and Spiritual Care consult, as indicated  1/8/2023 1128 by Neeru Brown RN  Outcome: Progressing  1/8/2023 0903 by Uri Goddard RN  Outcome: Progressing  1/8/2023 0042 by Evelyn Guillen RN  Outcome: Progressing     Problem: Safety - Adult  Goal: Free from fall injury  1/8/2023 1128 by Neeru Brown RN  Outcome: Progressing  1/8/2023 0903 by Uri Goddard RN  Outcome: Progressing  1/8/2023 0042 by Kitty Bonds RN  Outcome: Progressing  Flowsheets (Taken 1/8/2023 0039)  Free From Fall Injury: Instruct family/caregiver on patient safety     Problem: ABCDS Injury Assessment  Goal: Absence of physical injury  1/8/2023 1128 by Marleny Aguirre RN  Outcome: Progressing  1/8/2023 0903 by Clarisa Dempsey RN  Outcome: Progressing  1/8/2023 0042 by Kitty Bonds RN  Outcome: Progressing  Flowsheets (Taken 1/8/2023 0039)  Absence of Physical Injury: Implement safety measures based on patient assessment

## 2023-01-08 NOTE — PLAN OF CARE
Problem: Discharge Planning  Goal: Discharge to home or other facility with appropriate resources  1/8/2023 0903 by Isha Pearce RN  Outcome: Progressing  1/8/2023 0042 by Roger Valdez RN  Outcome: Progressing  Flowsheets (Taken 1/7/2023 2000)  Discharge to home or other facility with appropriate resources:   Identify barriers to discharge with patient and caregiver   Arrange for needed discharge resources and transportation as appropriate     Problem: Pain  Goal: Verbalizes/displays adequate comfort level or baseline comfort level  1/8/2023 0903 by Isha Pearce RN  Outcome: Progressing  1/8/2023 0042 by Roger Valdez RN  Outcome: Progressing     Problem: Skin/Tissue Integrity  Goal: Absence of new skin breakdown  1/8/2023 0903 by Isha Pearce RN  Outcome: Progressing  1/8/2023 0042 by Roger Valdez RN  Outcome: Progressing     Problem: Nutrition Deficit:  Goal: Optimize nutritional status  1/8/2023 0903 by Isha Pearce RN  Outcome: Progressing  1/8/2023 0042 by Roger Valdez RN  Outcome: Progressing     Problem: Respiratory - Adult  Goal: Achieves optimal ventilation and oxygenation  1/8/2023 0903 by Isha Pearce RN  Outcome: Progressing  1/8/2023 0829 by Marifer Gallagher RCP  Flowsheets (Taken 1/8/2023 0324)  Achieves optimal ventilation and oxygenation:   Assess for changes in respiratory status   Assess for changes in mentation and behavior   Position to facilitate oxygenation and minimize respiratory effort   Oxygen supplementation based on oxygen saturation or arterial blood gases   Initiate smoking cessation protocol as indicated   Encourage broncho-pulmonary hygiene including cough, deep breathe, incentive spirometry   Assess and instruct to report shortness of breath or any respiratory difficulty   Respiratory therapy support as indicated  1/8/2023 0042 by Roger Valdez RN  Outcome: Progressing  Flowsheets (Taken 1/7/2023 2000)  Achieves optimal ventilation and oxygenation: Assess for changes in respiratory status     Problem: Safety - Adult  Goal: Free from fall injury  1/8/2023 0903 by Yaw Rod, RN  Outcome: Progressing  1/8/2023 0042 by Kostas Wu, RN  Outcome: Progressing  Flowsheets (Taken 1/8/2023 0039)  Free From Fall Injury: Instruct family/caregiver on patient safety

## 2023-01-08 NOTE — PLAN OF CARE
Problem: Respiratory - Adult  Goal: Achieves optimal ventilation and oxygenation  1/8/2023 0829 by Bridget Melo RCP  Flowsheets (Taken 1/8/2023 3797)  Achieves optimal ventilation and oxygenation:   Assess for changes in respiratory status   Assess for changes in mentation and behavior   Position to facilitate oxygenation and minimize respiratory effort   Oxygen supplementation based on oxygen saturation or arterial blood gases   Initiate smoking cessation protocol as indicated   Encourage broncho-pulmonary hygiene including cough, deep breathe, incentive spirometry   Assess and instruct to report shortness of breath or any respiratory difficulty   Respiratory therapy support as indicated

## 2023-01-09 LAB
ABSOLUTE EOS #: <0.03 K/UL (ref 0–0.44)
ABSOLUTE IMMATURE GRANULOCYTE: 0.13 K/UL (ref 0–0.3)
ABSOLUTE LYMPH #: 2.8 K/UL (ref 1.1–3.7)
ABSOLUTE MONO #: 1.24 K/UL (ref 0.1–1.2)
ANION GAP SERPL CALCULATED.3IONS-SCNC: 10 MMOL/L (ref 9–17)
BASOPHILS # BLD: 0 % (ref 0–2)
BASOPHILS ABSOLUTE: <0.03 K/UL (ref 0–0.2)
BUN BLDV-MCNC: 10 MG/DL (ref 6–20)
CALCIUM SERPL-MCNC: 8.4 MG/DL (ref 8.6–10.4)
CHLORIDE BLD-SCNC: 106 MMOL/L (ref 98–107)
CO2: 21 MMOL/L (ref 20–31)
CREAT SERPL-MCNC: 0.39 MG/DL (ref 0.5–0.9)
EOSINOPHILS RELATIVE PERCENT: 0 % (ref 1–4)
GFR SERPL CREATININE-BSD FRML MDRD: >60 ML/MIN/1.73M2
GLUCOSE BLD-MCNC: 147 MG/DL (ref 70–99)
HCT VFR BLD CALC: 34.6 % (ref 36.3–47.1)
HEMOGLOBIN: 10.7 G/DL (ref 11.9–15.1)
IMMATURE GRANULOCYTES: 1 %
LYMPHOCYTES # BLD: 22 % (ref 24–43)
MCH RBC QN AUTO: 28.5 PG (ref 25.2–33.5)
MCHC RBC AUTO-ENTMCNC: 30.9 G/DL (ref 28.4–34.8)
MCV RBC AUTO: 92.3 FL (ref 82.6–102.9)
MONOCYTES # BLD: 10 % (ref 3–12)
NRBC AUTOMATED: 0 PER 100 WBC
PDW BLD-RTO: 15.9 % (ref 11.8–14.4)
PLATELET # BLD: 478 K/UL (ref 138–453)
PMV BLD AUTO: 10.6 FL (ref 8.1–13.5)
POTASSIUM SERPL-SCNC: 3.5 MMOL/L (ref 3.7–5.3)
RBC # BLD: 3.75 M/UL (ref 3.95–5.11)
RBC # BLD: ABNORMAL 10*6/UL
SEG NEUTROPHILS: 67 % (ref 36–65)
SEGMENTED NEUTROPHILS ABSOLUTE COUNT: 8.66 K/UL (ref 1.5–8.1)
SODIUM BLD-SCNC: 137 MMOL/L (ref 135–144)
WBC # BLD: 12.9 K/UL (ref 3.5–11.3)

## 2023-01-09 PROCEDURE — 6360000002 HC RX W HCPCS: Performed by: NURSE PRACTITIONER

## 2023-01-09 PROCEDURE — 2580000003 HC RX 258: Performed by: NURSE PRACTITIONER

## 2023-01-09 PROCEDURE — 6370000000 HC RX 637 (ALT 250 FOR IP): Performed by: STUDENT IN AN ORGANIZED HEALTH CARE EDUCATION/TRAINING PROGRAM

## 2023-01-09 PROCEDURE — 2580000003 HC RX 258: Performed by: STUDENT IN AN ORGANIZED HEALTH CARE EDUCATION/TRAINING PROGRAM

## 2023-01-09 PROCEDURE — 6360000002 HC RX W HCPCS

## 2023-01-09 PROCEDURE — 99232 SBSQ HOSP IP/OBS MODERATE 35: CPT | Performed by: INTERNAL MEDICINE

## 2023-01-09 PROCEDURE — 2060000000 HC ICU INTERMEDIATE R&B

## 2023-01-09 PROCEDURE — 97116 GAIT TRAINING THERAPY: CPT

## 2023-01-09 PROCEDURE — 6370000000 HC RX 637 (ALT 250 FOR IP): Performed by: NURSE PRACTITIONER

## 2023-01-09 PROCEDURE — 6360000002 HC RX W HCPCS: Performed by: STUDENT IN AN ORGANIZED HEALTH CARE EDUCATION/TRAINING PROGRAM

## 2023-01-09 PROCEDURE — 97162 PT EVAL MOD COMPLEX 30 MIN: CPT

## 2023-01-09 PROCEDURE — 36415 COLL VENOUS BLD VENIPUNCTURE: CPT

## 2023-01-09 PROCEDURE — 97530 THERAPEUTIC ACTIVITIES: CPT

## 2023-01-09 PROCEDURE — 80048 BASIC METABOLIC PNL TOTAL CA: CPT

## 2023-01-09 PROCEDURE — 6370000000 HC RX 637 (ALT 250 FOR IP): Performed by: INTERNAL MEDICINE

## 2023-01-09 PROCEDURE — 6360000002 HC RX W HCPCS: Performed by: INTERNAL MEDICINE

## 2023-01-09 PROCEDURE — 99233 SBSQ HOSP IP/OBS HIGH 50: CPT | Performed by: INTERNAL MEDICINE

## 2023-01-09 PROCEDURE — 85025 COMPLETE CBC W/AUTO DIFF WBC: CPT

## 2023-01-09 PROCEDURE — 97112 NEUROMUSCULAR REEDUCATION: CPT

## 2023-01-09 PROCEDURE — 2700000000 HC OXYGEN THERAPY PER DAY

## 2023-01-09 PROCEDURE — 94640 AIRWAY INHALATION TREATMENT: CPT

## 2023-01-09 PROCEDURE — 94760 N-INVAS EAR/PLS OXIMETRY 1: CPT

## 2023-01-09 RX ORDER — METOPROLOL TARTRATE 5 MG/5ML
2.5 INJECTION INTRAVENOUS EVERY 6 HOURS PRN
Status: DISCONTINUED | OUTPATIENT
Start: 2023-01-09 | End: 2023-01-11 | Stop reason: HOSPADM

## 2023-01-09 RX ORDER — LORAZEPAM 2 MG/ML
1 INJECTION INTRAMUSCULAR
Status: DISCONTINUED | OUTPATIENT
Start: 2023-01-09 | End: 2023-01-09

## 2023-01-09 RX ORDER — LORAZEPAM 2 MG/ML
2 INJECTION INTRAMUSCULAR EVERY 6 HOURS PRN
Status: DISCONTINUED | OUTPATIENT
Start: 2023-01-09 | End: 2023-01-09

## 2023-01-09 RX ORDER — LORAZEPAM 1 MG/1
1 TABLET ORAL EVERY 4 HOURS PRN
Status: DISCONTINUED | OUTPATIENT
Start: 2023-01-09 | End: 2023-01-11 | Stop reason: HOSPADM

## 2023-01-09 RX ADMIN — POTASSIUM CHLORIDE 40 MEQ: 1500 TABLET, EXTENDED RELEASE ORAL at 08:28

## 2023-01-09 RX ADMIN — SODIUM CHLORIDE TAB 1 GM 1 G: 1 TAB at 08:26

## 2023-01-09 RX ADMIN — ENOXAPARIN SODIUM 40 MG: 100 INJECTION SUBCUTANEOUS at 08:29

## 2023-01-09 RX ADMIN — METOCLOPRAMIDE 5 MG: 5 TABLET ORAL at 13:30

## 2023-01-09 RX ADMIN — SODIUM CHLORIDE, PRESERVATIVE FREE 10 ML: 5 INJECTION INTRAVENOUS at 08:50

## 2023-01-09 RX ADMIN — AMLODIPINE BESYLATE 10 MG: 10 TABLET ORAL at 08:25

## 2023-01-09 RX ADMIN — SUCRALFATE 1 G: 1 TABLET ORAL at 13:07

## 2023-01-09 RX ADMIN — CARBAMAZEPINE 100 MG: 100 SUSPENSION ORAL at 08:28

## 2023-01-09 RX ADMIN — PRAZOSIN HYDROCHLORIDE 1 MG: 1 CAPSULE ORAL at 21:07

## 2023-01-09 RX ADMIN — FOLIC ACID 1 MG: 1 TABLET ORAL at 08:26

## 2023-01-09 RX ADMIN — LORAZEPAM 1 MG: 1 TABLET ORAL at 21:06

## 2023-01-09 RX ADMIN — METOCLOPRAMIDE 5 MG: 5 TABLET ORAL at 18:55

## 2023-01-09 RX ADMIN — PREGABALIN 200 MG: 100 CAPSULE ORAL at 21:07

## 2023-01-09 RX ADMIN — METOCLOPRAMIDE 5 MG: 5 TABLET ORAL at 08:25

## 2023-01-09 RX ADMIN — SODIUM CHLORIDE TAB 1 GM 1 G: 1 TAB at 18:55

## 2023-01-09 RX ADMIN — PIPERACILLIN AND TAZOBACTAM 3375 MG: 3; .375 INJECTION, POWDER, FOR SOLUTION INTRAVENOUS at 10:17

## 2023-01-09 RX ADMIN — SODIUM CHLORIDE: 9 INJECTION, SOLUTION INTRAVENOUS at 18:16

## 2023-01-09 RX ADMIN — BACLOFEN 10 MG: 10 TABLET ORAL at 08:49

## 2023-01-09 RX ADMIN — PANTOPRAZOLE SODIUM 40 MG: 40 TABLET, DELAYED RELEASE ORAL at 21:08

## 2023-01-09 RX ADMIN — ACETAMINOPHEN 650 MG: 325 TABLET ORAL at 08:49

## 2023-01-09 RX ADMIN — SODIUM CHLORIDE: 9 INJECTION, SOLUTION INTRAVENOUS at 10:12

## 2023-01-09 RX ADMIN — PANCRELIPASE LIPASE, PANCRELIPASE PROTEASE, PANCRELIPASE AMYLASE 40000 UNITS: 20000; 63000; 84000 CAPSULE, DELAYED RELEASE ORAL at 18:55

## 2023-01-09 RX ADMIN — TOPIRAMATE 25 MG: 25 TABLET, FILM COATED ORAL at 21:08

## 2023-01-09 RX ADMIN — PANCRELIPASE LIPASE, PANCRELIPASE PROTEASE, PANCRELIPASE AMYLASE 40000 UNITS: 20000; 63000; 84000 CAPSULE, DELAYED RELEASE ORAL at 13:07

## 2023-01-09 RX ADMIN — ACETAMINOPHEN 650 MG: 325 TABLET ORAL at 15:36

## 2023-01-09 RX ADMIN — IPRATROPIUM BROMIDE AND ALBUTEROL SULFATE 1 AMPULE: .5; 3 SOLUTION RESPIRATORY (INHALATION) at 08:53

## 2023-01-09 RX ADMIN — ROPINIROLE HYDROCHLORIDE 1 MG: 1 TABLET, FILM COATED ORAL at 21:08

## 2023-01-09 RX ADMIN — PIPERACILLIN AND TAZOBACTAM 3375 MG: 3; .375 INJECTION, POWDER, FOR SOLUTION INTRAVENOUS at 18:17

## 2023-01-09 RX ADMIN — SUCRALFATE 1 G: 1 TABLET ORAL at 08:26

## 2023-01-09 RX ADMIN — BUSPIRONE HYDROCHLORIDE 30 MG: 15 TABLET ORAL at 08:25

## 2023-01-09 RX ADMIN — CARBAMAZEPINE 100 MG: 100 SUSPENSION ORAL at 21:07

## 2023-01-09 RX ADMIN — MIRTAZAPINE 15 MG: 15 TABLET, FILM COATED ORAL at 21:07

## 2023-01-09 RX ADMIN — SUCRALFATE 1 G: 1 TABLET ORAL at 18:55

## 2023-01-09 RX ADMIN — SODIUM CHLORIDE TAB 1 GM 1 G: 1 TAB at 13:07

## 2023-01-09 RX ADMIN — SUCRALFATE 1 G: 1 TABLET ORAL at 21:08

## 2023-01-09 RX ADMIN — ATORVASTATIN CALCIUM 20 MG: 20 TABLET, FILM COATED ORAL at 08:27

## 2023-01-09 RX ADMIN — IPRATROPIUM BROMIDE AND ALBUTEROL SULFATE 1 AMPULE: .5; 3 SOLUTION RESPIRATORY (INHALATION) at 11:59

## 2023-01-09 RX ADMIN — BACLOFEN 10 MG: 10 TABLET ORAL at 15:35

## 2023-01-09 RX ADMIN — PIPERACILLIN AND TAZOBACTAM 3375 MG: 3; .375 INJECTION, POWDER, FOR SOLUTION INTRAVENOUS at 00:45

## 2023-01-09 RX ADMIN — TOPIRAMATE 25 MG: 25 TABLET, FILM COATED ORAL at 08:28

## 2023-01-09 RX ADMIN — PANCRELIPASE LIPASE, PANCRELIPASE PROTEASE, PANCRELIPASE AMYLASE 40000 UNITS: 20000; 63000; 84000 CAPSULE, DELAYED RELEASE ORAL at 08:26

## 2023-01-09 RX ADMIN — BUSPIRONE HYDROCHLORIDE 30 MG: 15 TABLET ORAL at 21:07

## 2023-01-09 RX ADMIN — Medication 100 MG: at 08:26

## 2023-01-09 RX ADMIN — LORAZEPAM 2 MG: 2 INJECTION INTRAMUSCULAR; INTRAVENOUS at 02:16

## 2023-01-09 RX ADMIN — PREDNISONE 40 MG: 20 TABLET ORAL at 08:26

## 2023-01-09 RX ADMIN — FENTANYL CITRATE 50 MCG: 50 INJECTION INTRAMUSCULAR; INTRAVENOUS at 18:55

## 2023-01-09 RX ADMIN — IPRATROPIUM BROMIDE AND ALBUTEROL SULFATE 1 AMPULE: .5; 3 SOLUTION RESPIRATORY (INHALATION) at 19:56

## 2023-01-09 ASSESSMENT — ENCOUNTER SYMPTOMS
COUGH: 1
EYE REDNESS: 0
WHEEZING: 0
VOICE CHANGE: 0
TROUBLE SWALLOWING: 0
SHORTNESS OF BREATH: 1
VOMITING: 0
DIARRHEA: 0
PHOTOPHOBIA: 0
SORE THROAT: 0
ABDOMINAL PAIN: 0
ALLERGIC/IMMUNOLOGIC NEGATIVE: 1

## 2023-01-09 ASSESSMENT — PAIN DESCRIPTION - LOCATION
LOCATION: LEG;BACK
LOCATION: BACK;LEG

## 2023-01-09 ASSESSMENT — PAIN SCALES - GENERAL
PAINLEVEL_OUTOF10: 7
PAINLEVEL_OUTOF10: 6

## 2023-01-09 NOTE — FLOWSHEET NOTE
Patient called writer to her room. Patient states \"I had a seizure a couple minutes agon\",  When Benito Gasca asked patient to discribe what happened, patient states \"I felt tingling around my mouth and then my arms started to twitch\". Patient is awake. Writer did not witness any of the activity that patient reported. .Alert and oriented. [facility], NP notified of patient concerns.

## 2023-01-09 NOTE — FLOWSHEET NOTE
Patient monitor alarmed and patient heart rate in 130's. Upon entering patient room, patient was noted to be shaking and moaning. This lasted for approximately 2 minutes. When the shaking stopped, patient was not able to communicate with nurses. She would not follow commands or answer questions. She appeared dazed and confused.

## 2023-01-09 NOTE — FLOWSHEET NOTE
Patient again called writer to her room. Patient states \" I don't feel right. Something is going on\"   When writer questioned patient about what was happening, Patient states \"Something is happening. I don't fell right. My heart is pounding. My eyes and my head feel funny\"  Writekiesha Bolanos served Aleah Lopez again and requested for patient to be seen by in house doctor.

## 2023-01-09 NOTE — SIGNIFICANT EVENT
Multiple pages from nurse concerned that patient is having a seizure. I assessed her in person. She is aware of entire event. She says her anxiety is heightened and she doesn't know why. She reported tremors, autonomic hyperactivity nd a brief sensation of periorbital tingling. She has no post ictal state. No urinary or fecal incontinence. No tongue biting. AO3 without focal deficits. She says her last seizure was 2 years ago in the setting of withdrawal. I will provide prn ativan. Which will help with anxiety as well as the potential of it being a seizure but its not. Its not a seizure. Please page me if there is a post ictal state or witnessed seizure activity. PRN meds have been placed. It appears theres anxiety component from both patient and staff. #Panic Attack  -PRN iV anxiolytic  -already on multiple meds  -treat anxiety for elevated HR first and if still elevated in setting of resolved anxiety therers a prn IV metop 2.5 with parameters and feel free to page me.

## 2023-01-09 NOTE — FLOWSHEET NOTE
Patient noted to be having what appears to be seizure. Patient slumped to right side of the bed and upper body and arms thrashing. Ativan 2 mg given IV. The activiity lasted approx 1 1/2 minutes. She appeared very tired when shaking stopped. Bedside sitter remains with patient to monitor activity.

## 2023-01-09 NOTE — PROGRESS NOTES
Physical Therapy  Facility/Department: [facility]  Physical Therapy Initial Assessment    Name: Estella Aguilar  : 1970  MRN: [id number]  Date of Service: 2023  History per EMR and pt account     58-year-old female past medical history significant for chronic pancreatitis, polysubstance abuse, ethanol, major depressive disorder with multiple psych medication came into the ED with altered mental status and desatted to 42%. Lab in the ED was remarkable for leukocytosis 17.5 and UA concerning for UTI. Trauma work-up was negative CT pulmonary was also negative but did demonstrate diffuse groundglass opacities with mosaic type distribution. Patient was transferred to the ICU for further respiratory support. And was put on alternating BiPAP with high flow nasal cannula. Cardio was consulted in the ICU for uptrending troponin which eventually plateaued and echo was done which was unremarkable for any ischemic changes. But on 2023, patient became altered and was in the setting AVM in the BiPAP with 100% FiO2. A decision was made to intubate the patient and put him on ventilator. Patient was put on IV Zosyn for suspected pneumonia. Currently on Zosyn. Subsequently patient respiratory status progressively improved. On 2023, patient CPAP well and was ready for extubation. On 2023, patient was extubated and was ready to be transferred. Currently she is on 3 L of nasal cannula oxygen     Discharge Recommendations:  Patient would benefit from continued therapy after discharge   PT Equipment Recommendations  Equipment Needed: No      Patient Diagnosis(es): The primary encounter diagnosis was Hypoxia. Diagnoses of Altered mental status, unspecified altered mental status type, Pneumonia of both lungs due to infectious organism, unspecified part of lung, and Acute on chronic respiratory failure with hypoxia ([facility] [address] were also pertinent to this visit.   Past Medical History:  has a past medical history of Acute respiratory failure with hypoxia ([address] Alcohol withdrawal syndrome, with delirium ([address] Alcoholism ([address] Anal warts, Anemia, Anxiety, 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24, the patient underwent 2 surgical resections without known recurrence, Bandemia, Closed fracture of lateral portion of left tibial plateau, Condyloma, COPD (chronic obstructive pulmonary disease) ([address] Depression, Dysphagia, GI bleed, Hypertension, Memory loss, Oxygen dependent, Pain, joint, ankle and foot, Pancreatic lesion, Perianal wart, Peripheral neuropathy, Seizures ([address] Tension headache, Under care of team, Under care of team, Under care of team, Under care of team, Under care of team, Under care of team, Wears glasses, Wears partial dentures, and Wellness examination. Past Surgical History:  has a past surgical history that includes Hysterectomy (2003); Brain tumor excision (1989); fracture surgery (Left, 07/03/2013); fracture surgery (Right); Upper gastrointestinal endoscopy (N/A, 10/22/2020); Colonoscopy (N/A, 10/22/2020); Endoscopic ultrasonography, GI (N/A, 12/09/2020); Upper gastrointestinal endoscopy (N/A, 04/05/2021); Hand surgery; CT ABSCESS DRAINAGE (07/28/2021); ERCP (N/A, 08/11/2021); ERCP (08/11/2021); Upper gastrointestinal endoscopy (N/A, 09/08/2021); Spine surgery (N/A, 09/10/2021); ERCP (N/A, 10/21/2021); ERCP (10/21/2021); ERCP (10/21/2021); Colonoscopy (N/A, 11/19/2021); Anus surgery (N/A, 01/25/2022); endoscopic ultrasound (upper) (02/09/2022); Upper gastrointestinal endoscopy (N/A, 2/9/2022); and Upper gastrointestinal endoscopy (2/9/2022). Assessment   Body Structures, Functions, Activity Limitations Requiring Skilled Therapeutic Intervention: Decreased functional mobility ; Decreased body mechanics; Decreased balance;Decreased safe awareness;Decreased strength  Assessment: Pt presents with general weakness and deconditioing impacting gait and dynamic standing balance.  Pt will continue to benefit from PT intervention to progress activity and promote functional independence needed to regain prior level of independence.  Therapy Prognosis: Good  Decision Making: Medium Complexity  Requires PT Follow-Up: Yes  Activity Tolerance  Activity Tolerance: Patient tolerated evaluation without incident;Patient limited by endurance     Plan   Physcial Therapy Plan  General Plan:  (5-6x/wk)  Current Treatment Recommendations: Strengthening, Balance training, Functional mobility training, Transfer training, Neuromuscular re-education, Stair training, Gait training, Therapeutic activities, Safety education & training  Safety Devices  Type of Devices: Call light within reach, Left in bed, Gait belt, Nurse notified (1:1 sitter and mother at pt's bed side upon completion of PT evaluation, Pt left up to chair with seizure pads and call light in place.)  Restraints  Restraints Initially in Place: No     Restrictions  Restrictions/Precautions  Restrictions/Precautions: Fall Risk, Seizure, Up as Tolerated, Other (comment)  Required Braces or Orthoses?: No  Position Activity Restriction  Other position/activity restrictions: up with assist,  1:1 sitter in place, maintain SPo2 > 92 %     Subjective   General  Chart Reviewed: Yes  Patient assessed for rehabilitation services?: Yes  Additional Pertinent Hx: Fatigue    Fall   Respiratory Distress  Response To Previous Treatment: Not applicable  Family / Caregiver Present: Yes (mother and 1:1 sitter at bed side)  Follows Commands: Within Functional Limits  Other (Comment): Pt awake and alert in agreement with PT intervention  General Comment  Comments: Pt report independent at baseline  Subjective  Subjective: Pt report mild back pain and stiffness , not limitingg ability to participate         Social/Functional History  Social/Functional History  Lives With: Alone  Type of Home: House  Home Layout: One level  Home Access: Stairs to enter without rails  Entrance Stairs - Number  of Steps: 1 through garage + 1 step into home no raining  Bathroom Shower/Tub: Tub/Shower unit  Bathroom Toilet: Standard  Bathroom Equipment: Shower chair  Home Equipment: Dewaine Ilya, rolling, Cane, Oxygen (maintain Spo2  88-92 %)  Has the patient had two or more falls in the past year or any fall with injury in the past year?: Yes  Receives Help From: Family  ADL Assistance: Independent  Homemaking Assistance: Independent  Homemaking Responsibilities: Yes  Meal Prep Responsibility: Primary  Laundry Responsibility: Primary  Cleaning Responsibility: Primary  Ambulation Assistance: Independent  Transfer Assistance: Independent  Active : No  Patient's  Info: med cab  Mode of Transportation: Family  Occupation: On disability  Leisure & Hobbies: reading, stain glass, Movies  Additional Comments: pt reported supportive mom and friend able to assist PRN  Vision/Hearing  Vision  Vision: Impaired  Vision Exceptions: Wears glasses at all times  Tracking: Able to track stimulus in all quads w/o difficulty  Hearing  Hearing: Within functional limits    Cognition   Orientation  Overall Orientation Status: Within Functional Limits  Cognition  Overall Cognitive Status: WFL  Following Commands: Follows all commands without difficulty  Attention Span: Appears intact  Memory: Appears intact  Initiation: Requires cues for some  Sequencing: Requires cues for some  Cognition Comment: Pt able to make needs known, understanding deficits     Objective   Heart Rate: 89  Heart Rate Source: Monitor  BP: 99/68  BP Location: Left upper arm  BP Method: Automatic  Patient Position: Semi fowlers  MAP (Calculated): 78  Resp: 18  SpO2: 97 %  O2 Device: Nasal cannula     Observation/Palpation  Posture: Good  Observation: Pt demonstrate normal posture in sitting and standing  Gross Assessment  AROM: Within functional limits  PROM: Within functional limits  Strength:  Within functional limits  Coordination: Within functional limits  Tone: Normal  Sensation: Intact     AROM RLE (degrees)  RLE AROM: WNL  AROM LLE (degrees)  LLE AROM : WNL  AROM RUE (degrees)  RUE AROM : WNL  AROM LUE (degrees)  LUE AROM : WNL  Strength RLE  Strength RLE: WFL  Strength LLE  Strength LLE: WFL  Strength RUE  Strength RUE: WFL  Strength LUE  Strength LUE: WFL  Strength Other  Other: Pt presents with general weakness and decondition           Bed mobility  Bridging: Supervision  Rolling to Left: Supervision  Rolling to Right: Supervision  Supine to Sit: Supervision  Sit to Supine: Supervision  Scooting: Supervision  Bed Mobility Comments: Pt remain under seizure precautions with close supervision for safety,, Pt able to complete task without physical assist  Transfers  Sit to Stand: Stand by assistance  Stand to Sit: Stand by assistance  Bed to Chair: Stand by assistance  Ambulation  Surface: Level tile  Device: Rolling Walker  Assistance: Contact guard assistance  Quality of Gait: slow guarded gait, pt initiall seek constant external support, eductated on safety with use of AD  Gait Deviations: Decreased step length;Decreased step height; Increased YUNIER; Slow Leticia  Distance: 150 ft RW CGA  Comments: Pt report decline in endurance and strength with need for external support needed and new need for rolling walker with gait  Stairs/Curb  Stairs?: No     Balance  Posture: Good  Sitting - Static: Good  Sitting - Dynamic: Good  Standing - Static: Good;-  Standing - Dynamic: Fair;-  Comments: PT recommendation use of AD with all gait and transfers pending further PT intervention, Pt completed stand balance activity and training without AD indicating high fall risk educated on use and benefit from use of walker.   Exercise Treatment: Pt able to sit EOB x 12 min SBA, unsupported, STS x 4 education on safety with use of walker                                                        AM-PAC Score  AM-PAC Inpatient Mobility Raw Score : 22 (01/09/23 7937)  AM-PAC Inpatient T-Scale Score : 53.28 (01/09/23 1609)  Mobility Inpatient CMS 0-100% Score: 20.91 (01/09/23 1609)  Mobility Inpatient CMS G-Code Modifier : CJ (01/09/23 1609)               Goals  Short Term Goals  Time Frame for Short Term Goals: 14 visits  Short Term Goal 1: ambulate 200 ft mod I  Short Term Goal 2: ascend /descend 2 steps Mod I  Short Term Goal 3: tranfer alternate height surfaces mod I  Short Term Goal 4: Pt to tolerate 30 min ther ex/act to improve endurance  Patient Goals   Patient Goals : to return;home       Education  Patient Education  Education Given To: Patient; Family  Education Provided: Role of Therapy;Plan of Care;Transfer Training;Equipment; Fall Prevention Strategies;Precautions  Education Provided Comments: education on safety with use of walker  Education Method: Demonstration  Barriers to Learning: None  Education Outcome: Verbalized understanding      Therapy Time   Individual Concurrent Group Co-treatment   Time In 1320         Time Out 1415         Minutes 55         Timed Code Treatment Minutes: 65 [facility]       Princess Rashid, PT, DPT

## 2023-01-09 NOTE — FLOWSHEET NOTE
Patient has bedside sitter. Bedside sitter notified writer and that patient was having shaking and jerking movements again. Upon entering room, patient was noted to be thrashing in bed. This activity lasted for approximately 1 1/12 minutes. Patient was confused and would not answer questions when the shaking stopped. Khurram Mcadams contacted again through [address] [place] [address].

## 2023-01-09 NOTE — PROGRESS NOTES
PULMONARY & CRITICAL CARE MEDICINE PROGRESS  NOTE     Patient:  Syed Baker  MRN: [id number]  Admit date: 1/1/2023  Primary Care Physician: Maricel Galaviz MD  Consulting Physician: Golden Castillo DO  CODE Status: Full Code  LOS: 8    SUBJECTIVE     I personally interviewed/examined the patient, reviewed interval history and interpreted all available radiographic, laboratory data at the time of service. Chief Compliant/Reason for Initial Consult:   Acute hypoxic hypercapnic respiratory failure on chronic hypoxic respiratory failure/pneumonia    Brief Hospital Course: The patient is a 47 y.o. female she has history of COPD/chronic hypoxic respiratory failure on nocturnal O2/history of chronic pancreatitis/depression and generalized anxiety disorder (per chart)/history of alcohol use/hypertension. Brought to the emergency room with altered mental status severe hypoxia initially treated with nonrebreather was apparently agitated and combative in the emergency room slightly improved initial CT scan was negative for pulm embolism but shows bilateral areas of groundglass changes diffusely concern for aspiration. Patient was admitted to ICU initially was on BiPAP high flow but ultimately requiring intubation on 01/04/2023 requiring sedation/apparently was on CIWA protocol and treated with Zosyn for bilateral pneumonia chest x-ray shows right lower lobe atelectasis/consolidation/infiltrate and lower left lower lobe infiltrate. Patient ultimately was weaned down on sedation arousable and was extubated on 01/07/2023 and was transferred out of ICU on 01/07/2023. Interval History:  01/09/23  Overnight event noted  She has shortness of breath with minimal exertion  Saturating 97 to 99% on 3 L oxygen  Mild tachypnea  Review of Systems:  Review of Systems   Constitutional:  Positive for fatigue.  Negative for appetite change and unexpected weight change. HENT:  Negative for postnasal drip, sore throat, trouble swallowing and voice change. Eyes:  Negative for photophobia, redness and visual disturbance. Respiratory:  Positive for cough and shortness of breath. Negative for wheezing. Cardiovascular:  Negative for chest pain and leg swelling. Gastrointestinal:  Negative for abdominal pain, diarrhea and vomiting. Endocrine: Negative for polydipsia, polyphagia and polyuria. Genitourinary:  Negative for difficulty urinating, dysuria, frequency and hematuria. Musculoskeletal: Negative. Allergic/Immunologic: Negative. Neurological:  Negative for dizziness, tremors, speech difficulty and headaches. Hematological:  Negative for adenopathy. Does not bruise/bleed easily. Psychiatric/Behavioral: Negative. OBJECTIVE     VITAL SIGNS:   LAST-  /85   Pulse 88   Temp 98.6 °F (37 °C) (Oral)   Resp 18   Ht 5' 6\" (1.676 m)   Wt 134 lb 0.6 oz (60.8 kg)   SpO2 99%   BMI 21.63 kg/m²   8-24 HR RANGE-  TEMP Temp  Av.3 °F (36.8 °C)  Min: 97.3 °F (36.3 °C)  Max: 98.8 °F (43.5 °C)   BP Systolic (71ZSL), YZI:876 , Min:99 , KRO:342      Diastolic (42RRJ), BTX:97, Min:68, Max:85     PULSE Pulse  Av.6  Min: 86  Max: 137   RR Resp  Av  Min: 18  Max: 18   O2 SAT SpO2  Av %  Min: 97 %  Max: 99 %   OXYGEN DELIVERY No data recorded     Systemic Examination:   Physical Exam  Head and neck atraumatic, normocephalic    Lymph nodes-no cervical, supraclavicular lymphadenopathy    Neck-no JVP elevation    Lungs - AP diameter of chest increased. Thoracic expansion and diaphragmatic excursion diminished. BS diminished and expiratory phase prolonged. No dullness to percussion or tenderness to palpation. No bronchial breath sounds . CVS- S1, S2 regular. No S3 no S4, no murmurs    Abdomen-nontender, nondistended. Bowel sounds are present.   No organomegaly    Lower extremity-no edema    Upper extremity-no edema    Neurological-grossly normal cranial nerves.   No overt motor deficit      DATA REVIEW     Medications:  Scheduled Meds:   amLODIPine  10 mg Oral Daily    folic acid  1 mg Oral Daily    metoclopramide  5 mg Oral TID AC    nicotine  1 patch TransDERmal Daily    sodium chloride  1 g Oral TID WC    predniSONE  40 mg Oral Daily    Followed by    Paul Grumbling ON 1/11/2023] predniSONE  30 mg Oral Daily    Followed by    Paul Grumbling ON 1/14/2023] predniSONE  20 mg Oral Daily    Followed by    Paul Grumbling ON 1/17/2023] predniSONE  10 mg Oral Daily    prazosin  1 mg Oral Nightly    pantoprazole  40 mg Oral Nightly    carBAMazepine  100 mg Oral BID    topiramate  25 mg Oral BID    ipratropium-albuterol  1 ampule Inhalation Q4H WA    piperacillin-tazobactam  3,375 mg IntraVENous Q8H    atorvastatin  20 mg Oral Daily    busPIRone  30 mg Oral BID    Pancrelipase (Lip-Prot-Amyl)  40,000 Units Oral TID WC    mirtazapine  15 mg Oral Nightly    pregabalin  200 mg Oral Nightly    rOPINIRole  1 mg Oral Nightly    sucralfate  1 g Oral 4x Daily    sodium chloride flush  5-40 mL IntraVENous 2 times per day    enoxaparin  40 mg SubCUTAneous Daily    sodium chloride flush  5-40 mL IntraVENous 2 times per day    thiamine  100 mg Oral Daily    magnesium sulfate  2,000 mg IntraVENous Once     Continuous Infusions:   sodium chloride 100 mL/hr at 01/09/23 1012    sodium chloride 20 mL/hr at 01/08/23 0035     LABS:-  ABG:   Recent Labs     01/07/23  0438   POCPH 7.401   POCPCO2 45.4   POCPO2 103.9   POCHCO3 28.2*   CTYL9WCQ 98       CBC:   Recent Labs     01/07/23  0456 01/08/23  0458 01/09/23  0328   WBC 12.1* 12.3* 12.9*   HGB 11.7* 10.6* 10.7*   HCT 36.8 33.5* 34.6*   MCV 90.4 90.1 92.3    464* 478*   LYMPHOPCT 20* 32 22*   RBC 4.07 3.72* 3.75*   MCH 28.7 28.5 28.5   MCHC 31.8 31.6 30.9   RDW 15.5* 15.6* 15.9*       BMP:   Recent Labs     01/07/23  0456 01/08/23  0458 01/09/23  0328    141 137   K 3.7 3.7 3.5*    106 106   CO2 26 23 21   BUN 22* 15 10   CREATININE 0.29* 0.40* 0.39*   GLUCOSE 173* 149* 147*       Liver Function Test:   No results for input(s): PROT, LABALBU, ALT, AST, GGT, ALKPHOS, BILITOT in the last 72 hours. Amylase/Lipase:  No results for input(s): AMYLASE, LIPASE in the last 72 hours. Coagulation Profile:   No results for input(s): INR, PROTIME, APTT in the last 72 hours. Cardiac Enzymes:  No results for input(s): CKTOTAL, CKMB, CKMBINDEX, TROPONINI in the last 72 hours. Lactic Acid:  No results found for: LACTA  BNP:   No results found for: BNP  D-Dimer:  Lab Results   Component Value Date    DDIMER 2.17 12/15/2018     Others:   Lab Results   Component Value Date    TSH 1.40 01/02/2023     Lab Results   Component Value Date    BRADLEY NEGATIVE 04/13/2021    SEDRATE 37 (H) 02/03/2021    .7 (H) 07/27/2021     No results found for: Gloria Grant  Lab Results   Component Value Date    IRON 35 (L) 01/08/2023    TIBC 242 (L) 01/08/2023    FERRITIN 182 (H) 01/08/2023     No results found for: SPEP, UPEP  Lab Results   Component Value Date/Time     21 07/21/2021 04:00 PM       Input/Output:  No intake or output data in the 24 hours ending 01/09/23 1703      Microbiology:  No results for input(s): SPECDESC, SPECDESC, SPECIAL, CULTURE, CULTURE, STATUS, ORG, CDIFFTOXPCR, CAMPYLOBPCR, SALMONELLAPC, SHIGAPCR, SHIGELLAPCR, MPNEUG, MPNEUM, LACTOQL in the last 72 hours. Pathology:    Radiology reports:  XR CHEST PORTABLE   Final Result   Improved aeration of the lung bases. XR CHEST PORTABLE   Final Result   Endotracheal tube tip is approximately 4 cm above the roma. OG tube tip is   in the stomach. XR CHEST (SINGLE VIEW FRONTAL)   Final Result   Small right pleural effusion with bibasilar airspace opacities.   This could   represent atelectasis versus pneumonia         US GALLBLADDER RUQ   Final Result   Overall findings suggestive of diffuse hepatic parenchymal disease with   evidence to indicate underlying cirrhosis. Trace pericholecystic fluid is nonspecific and could be related to underlying   diffuse hepatic disease/cirrhosis. Otherwise, no other sonographic findings   to indicate the presence of acute cholecystitis. CT CHEST PULMONARY EMBOLISM W CONTRAST   Final Result   1. No evidence of pulmonary embolism. 2. Hazy vascular appearance with diffuse ground-glass background parenchymal   pattern with mosaic type distribution suggesting pulmonary venous congestion. Additionally, there is venous enhancement of the liver suggesting secondary   congestive change. Overall appearance suggest pulmonary edema. 3. Fatty liver. Although incompletely included in the field of view suspect   hepatomegaly. 4. Pancreatic calcifications suggesting chronic pancreatitis. 5. Enlarged right hilar lymph node. CT ABDOMEN PELVIS W IV CONTRAST Additional Contrast? None   Final Result   Findings suggesting stable subacute to chronic superior endplate compression   fracture of L1 with approximately 20% loss of vertebral body height. This is   seen on prior chest CT. Mild acute inferior endplate compression fracture of L2 without significant   loss of vertebral body height. This is new since prior 09/09/2021 CT but is   age indeterminate. Given history of recent trauma, these may be acute. Distended gallbladder with wall thickening and pericholecystic fluid. This   is nonspecific but can be seen with acute cholecystitis in the proper   clinical setting. Incidentally noted nonobstructing stone in the mid pole left kidney. Evidence of remote pancreatitis. Findings suggesting atypical pneumonia in the lung bases. Please see   separate chest CT. CT THORACIC SPINE TRAUMA RECONSTRUCTION   Final Result   Chronic T8, T9, T11 and T12 compression fractures. Chronic burst compression   fracture of T6. No significant change since prior chest CT.   No new   fractures of the thoracic spine. CT LUMBAR SPINE TRAUMA RECONSTRUCTION   Final Result   Findings suggesting stable subacute to chronic superior endplate compression   fracture of L1 with approximately 20% loss of vertebral body height. This is   seen on prior chest CT. Mild acute inferior endplate compression fracture of L2 without significant   loss of vertebral body height. This is new since prior 09/09/2021 CT but is   age indeterminate. Given history of recent trauma, these may be acute. Distended gallbladder with wall thickening and pericholecystic fluid. This   is nonspecific but can be seen with acute cholecystitis in the proper   clinical setting. Incidentally noted nonobstructing stone in the mid pole left kidney. Evidence of remote pancreatitis. Findings suggesting atypical pneumonia in the lung bases. Please see   separate chest CT. CT HEAD WO CONTRAST   Final Result   No acute intracranial abnormality. CT CERVICAL SPINE WO CONTRAST   Final Result   No acute traumatic injury of the cervical spine. XR CHEST PORTABLE   Final Result   Diffuse infiltrates with differential diagnosis to include multifocal   pneumonitis versus interstitial edema. Echocardiogram:   No results found for this or any previous visit. Cardiac Catheterization:   No results found for this or any previous visit.       ASSESSMENT AND PLAN     Assessment:    //Acute hypoxic respiratory failure  //Chronic hypoxic respiratory failure on home O2 usually use it at night  //COPD severity not known  //Tobacco dependence more than 30-pack-year history of smoking  //Bilateral pneumonia possible aspiration  //Right lower lobe atelectasis improve  //Bilateral small pleural effusion  //History of hypertension  //History of chronic pancreatitis  //History of seizure    Plan:      Keep saturations 92%   Continue DuoNeb  Prednisone taper  Complete 7 days of antibiotics  Restart Trelegy on discharge and continue albuterol. Karyn Smiht MD, M.D. Pulmonary and Critical Care Medicine           1/9/2023, 5:03 PM    Please note that this chart was generated using voice recognition Dragon dictation software. Although every effort was made to ensure the accuracy of this automated transcription, some errors in transcription may have occurred.

## 2023-01-09 NOTE — FLOWSHEET NOTE
Patient awake. Talking to writer. Patient appears more alert. Following commands and answering questions.

## 2023-01-09 NOTE — CARE COORDINATION
Transitional planning-talked with patient. Plan is to go home with her mother, has ride. Not wanting any home care at this time. Patient guarded. On O2 3L per N/C    1730 patient's mother requesting to talk with the CM- talked with the patient and her mother. Patient is now wanting nursing home. Wanting [facility]. She was there once before. 1735 referral faxed and left message with Samira Meredith.

## 2023-01-09 NOTE — FLOWSHEET NOTE
Ai Gonzales NP called nursing unit to check on patient condition. Writer reported that patient continues to have what appears to be seizures,  Writer reported that patient is not able to communicate with staff after she has what appears to be seizures.

## 2023-01-09 NOTE — PLAN OF CARE
Problem: Respiratory - Adult  Goal: Achieves optimal ventilation and oxygenation  1/8/2023 2025 by Bertram Corona RCP  Outcome: Progressing   BRONCHOSPASM/BRONCHOCONSTRICTION     [x]         IMPROVE AERATION/BREATH SOUNDS  [x]   ADMINISTER BRONCHODILATOR THERAPY AS APPROPRIATE  [x]   ASSESS BREATH SOUNDS  []   IMPLEMENT AEROSOL/MDI PROTOCOL  [x]   PATIENT EDUCATION AS NEEDED

## 2023-01-09 NOTE — FLOWSHEET NOTE
Dr Alen Nixon at bedside to see patient. Spoke with patient. No orders received at this time. Will continue to monitor patient condition and activity.

## 2023-01-09 NOTE — PROGRESS NOTES
[facility]  Office: [address], DO, Rachell Foreman, DO, Rigo Ambrosio, DO, Jose Estrella Blood, DO, Stephanie Naik MD, Christiano Lundy MD, Teo Ponce MD, Anabel Sinclair MD,  Virginia Marks MD, Mauricio Chan MD, Nguyễn Kingston, DO, Li Castillo MD,  Julio Cesar Perez MD, Vida Clark MD, Rhonda Wright, DO, Saman Simpson MD, Chandler Lombard, MD, Demarcus García, DO, Nora Goldstein MD, Joey Carey MD, Debbie Justin MD, Farzad Ventura MD, Mer Seo DO, Jessika Simmons MD, Aristeo Kennedy MD, Cristy Moody, CNP,  Terri García, CNP, Rosendo Leong, CNP, Jhoana Lopez, CNP,  Samara Yun, [facility], Cruz Badillo, CNP, Tamar Casas CNP, Michael Garcia, CNP, Chino Ortiz, CNP, [place], CNP, Champ Triplett PA-C, Nia Yanes, CNS, Maisha Arango, CNP, Marie Sanchez, [address] [place] [address]    Progress Note    1/9/2023    3:02 PM    Name:   Augusta Simeon  MRN:     [id number]     Acct:      [de-identified]   Room:   [address] [place] [address] Day:  8  Admit Date:  1/1/2023 12:55 PM    PCP:   Tierra Castro MD  Code Status:  Full Code    Subjective:     C/C:   Chief Complaint   Patient presents with    Fatigue    Fall    Respiratory Distress     Interval History Status: worse    Pt had an episode of anxiety last night and had a panic attack that improved with one dose PO ativan. She says she was in bed when she rolled over and suddenly started feeling anxious which is what caused this to happen. She remembers the entire event. Did not lose consciousness, did not have loss of bowel bladder function and no tongue biting. Not post ictal.     She lives alone, feels like shes weaker than she was before being admitted to the ICU    No fevers, chills, nausea, vomiting or diarrhea.      Brief History:     19-year-old female Mrs. Julio Larose with past medical history significant for chronic pancreatitis, polysubstance abuse, ethanol, major depressive disorder with multiple psych medication came into the ED with altered mental status and desatted to 42%. Lab in the ED was remarkable for leukocytosis 17.5 and UA concerning for UTI. Trauma work-up was negative CT pulmonary was also negative but did demonstrate diffuse groundglass opacities with mosaic type distribution. Patient was transferred to the ICU for further respiratory support. And was put on alternating BiPAP with high flow nasal cannula. Cardio was consulted in the ICU for uptrending troponin which eventually plateaued and echo was done which was unremarkable for any ischemic changes. But on 1/4/2023, patient became altered and was in the setting AVM in the BiPAP with 100% FiO2. A decision was made to intubate the patient and put him on ventilator. Patient was put on IV Zosyn for suspected pneumonia. Currently on Zosyn. Subsequently patient respiratory status progressively improved. On 1/6/2023, patient CPAP well and was ready for extubation. On 1/7/2023, patient was extubated and was ready to be transferred. Currently she is on 3 L of nasal cannula oxygen    Review of Systems:     Constitutional:  negative for chills, fevers, sweats  Respiratory:  improving cough, some dyspnea on exertion, shortness of breath, wheezing  Cardiovascular:  negative for chest pain, chest pressure/discomfort, lower extremity edema, palpitations  Gastrointestinal:  negative for abdominal pain, constipation, diarrhea, nausea, vomiting  Neurological:  negative for dizziness, headache +anxiety    Medications:      Allergies:  No Known Allergies    Current Meds:   Scheduled Meds:    amLODIPine  10 mg Oral Daily    folic acid  1 mg Oral Daily    metoclopramide  5 mg Oral TID AC    nicotine  1 patch TransDERmal Daily    sodium chloride  1 g Oral TID WC    predniSONE  40 mg Oral Daily    Followed by    Saintclair Muskrat ON 1/11/2023] predniSONE  30 mg Oral Daily    Followed by    Saintclair Muskrat ON 1/14/2023] predniSONE  20 mg Oral Daily    Followed by    Matteo Givens ON 1/17/2023] predniSONE  10 mg Oral Daily    prazosin  1 mg Oral Nightly    pantoprazole  40 mg Oral Nightly    carBAMazepine  100 mg Oral BID    topiramate  25 mg Oral BID    ipratropium-albuterol  1 ampule Inhalation Q4H WA    piperacillin-tazobactam  3,375 mg IntraVENous Q8H    atorvastatin  20 mg Oral Daily    busPIRone  30 mg Oral BID    Pancrelipase (Lip-Prot-Amyl)  40,000 Units Oral TID WC    mirtazapine  15 mg Oral Nightly    pregabalin  200 mg Oral Nightly    rOPINIRole  1 mg Oral Nightly    sucralfate  1 g Oral 4x Daily    sodium chloride flush  5-40 mL IntraVENous 2 times per day    enoxaparin  40 mg SubCUTAneous Daily    sodium chloride flush  5-40 mL IntraVENous 2 times per day    thiamine  100 mg Oral Daily    magnesium sulfate  2,000 mg IntraVENous Once     Continuous Infusions:    sodium chloride 100 mL/hr at 01/09/23 1012    sodium chloride 20 mL/hr at 01/08/23 0035     PRN Meds: LORazepam, LORazepam, metoprolol, baclofen, nicotine polacrilex, loperamide, lidocaine viscous hcl, fentanNYL, hydrOXYzine HCl, sodium chloride flush, sodium chloride, polyethylene glycol, acetaminophen **OR** acetaminophen, potassium chloride **OR** potassium alternative oral replacement **OR** potassium chloride, magnesium sulfate, sodium chloride flush, sodium chloride    Data:     Past Medical History:   has a past medical history of Acute respiratory failure with hypoxia ([facility] [address] Alcohol withdrawal syndrome, with delirium ([facility] [address] Alcoholism ([facility] [address] Anal warts, Anemia, Anxiety, Astrocytoma ([facility] [address] - diagnosed at age 24, the patient underwent 2 surgical resections without known recurrence, Bandemia, Closed fracture of lateral portion of left tibial plateau, Condyloma, COPD (chronic obstructive pulmonary disease) ([facility] [address] Depression, Dysphagia, GI bleed, Hypertension, Memory loss, Oxygen dependent, Pain, joint, ankle and foot, Pancreatic lesion, Perianal wart, Peripheral neuropathy, Seizures ([address] Tension headache, Under care of team, Under care of team, Under care of team, Under care of team, Under care of team, Under care of team, Wears glasses, Wears partial dentures, and Wellness examination. Social History:   reports that she has been smoking cigarettes. She has a 30.00 pack-year smoking history. She has never used smokeless tobacco. She reports that she does not currently use alcohol. She reports current drug use. Frequency: 2.00 times per week. Family History:   Family History   Problem Relation Age of Onset    Other Father     Cancer Maternal Grandmother     Heart Disease Paternal Grandmother     Esophageal Cancer Maternal Aunt     Arthritis Mother        Vitals:  BP 99/68   Pulse 89   Temp 98.8 °F (37.1 °C) (Oral)   Resp 18   Ht 5' 6\" (1.676 m)   Wt 134 lb 0.6 oz (60.8 kg)   SpO2 97%   BMI 21.63 kg/m²   Temp (24hrs), Av.2 °F (36.8 °C), Min:97.3 °F (36.3 °C), Max:98.8 °F (37.1 °C)    Recent Labs     23  0438 23  1214   POCGLU 186* 162*         I/O (24Hr):   No intake or output data in the 24 hours ending 23 1502      Labs:  Hematology:  Recent Labs     23  0456 238 23  0328   WBC 12.1* 12.3* 12.9*   RBC 4.07 3.72* 3.75*   HGB 11.7* 10.6* 10.7*   HCT 36.8 33.5* 34.6*   MCV 90.4 90.1 92.3   MCH 28.7 28.5 28.5   MCHC 31.8 31.6 30.9   RDW 15.5* 15.6* 15.9*    464* 478*   MPV 10.6 10.6 10.6       Chemistry:  Recent Labs     23  0456 23  0458 23  0328    141 137   K 3.7 3.7 3.5*    106 106   CO2 26 23 21   GLUCOSE 173* 149* 147*   BUN 22* 15 10   CREATININE 0.29* 0.40* 0.39*   ANIONGAP 10 12 10   LABGLOM >60 >60 >60   CALCIUM 9.1 8.4* 8.4*       Recent Labs     23  0438 23  1214   POCGLU 186* 162*       ABG:  Lab Results   Component Value Date/Time    POCPH 7.401 2023 04:38 AM    POCPCO2 45.4 2023 04:38 AM    POCPO2 103.9 2023 04:38 AM    POCHCO3 28.2 01/07/2023 04:38 AM    NBEA 8 01/01/2023 05:19 PM    PBEA 3 01/07/2023 04:38 AM    LHU2IPC NOT REPORTED 07/26/2021 03:38 PM    TQMC5RFC 98 01/07/2023 04:38 AM    FIO2 40.0 01/07/2023 04:38 AM     Lab Results   Component Value Date/Time    SPECIAL ac 10ml 01/01/2023 01:06 PM     Lab Results   Component Value Date/Time    CULTURE NORMAL RESPIRATORY SUSIE MODERATE GROWTH 01/04/2023 05:18 PM       Radiology:  XR CHEST (SINGLE VIEW FRONTAL)    Result Date: 1/4/2023  Small right pleural effusion with bibasilar airspace opacities. This could represent atelectasis versus pneumonia     CT HEAD WO CONTRAST    Result Date: 1/1/2023  No acute intracranial abnormality. CT CERVICAL SPINE WO CONTRAST    Result Date: 1/1/2023  No acute traumatic injury of the cervical spine. CT ABDOMEN PELVIS W IV CONTRAST Additional Contrast? None    Result Date: 1/1/2023  Findings suggesting stable subacute to chronic superior endplate compression fracture of L1 with approximately 20% loss of vertebral body height. This is seen on prior chest CT. Mild acute inferior endplate compression fracture of L2 without significant loss of vertebral body height. This is new since prior 09/09/2021 CT but is age indeterminate. Given history of recent trauma, these may be acute. Distended gallbladder with wall thickening and pericholecystic fluid. This is nonspecific but can be seen with acute cholecystitis in the proper clinical setting. Incidentally noted nonobstructing stone in the mid pole left kidney. Evidence of remote pancreatitis. Findings suggesting atypical pneumonia in the lung bases. Please see separate chest CT.     US GALLBLADDER RUQ    Result Date: 1/2/2023  Overall findings suggestive of diffuse hepatic parenchymal disease with evidence to indicate underlying cirrhosis. Trace pericholecystic fluid is nonspecific and could be related to underlying diffuse hepatic disease/cirrhosis.   Otherwise, no other sonographic findings to indicate the presence of acute cholecystitis. XR CHEST PORTABLE    Result Date: 1/5/2023  Improved aeration of the lung bases. XR CHEST PORTABLE    Result Date: 1/4/2023  Endotracheal tube tip is approximately 4 cm above the roma. OG tube tip is in the stomach. XR CHEST PORTABLE    Result Date: 1/1/2023  Diffuse infiltrates with differential diagnosis to include multifocal pneumonitis versus interstitial edema. CT CHEST PULMONARY EMBOLISM W CONTRAST    Result Date: 1/1/2023  1. No evidence of pulmonary embolism. 2. Hazy vascular appearance with diffuse ground-glass background parenchymal pattern with mosaic type distribution suggesting pulmonary venous congestion. Additionally, there is venous enhancement of the liver suggesting secondary congestive change. Overall appearance suggest pulmonary edema. 3. Fatty liver. Although incompletely included in the field of view suspect hepatomegaly. 4. Pancreatic calcifications suggesting chronic pancreatitis. 5. Enlarged right hilar lymph node. CT LUMBAR SPINE TRAUMA RECONSTRUCTION    Result Date: 1/1/2023  Findings suggesting stable subacute to chronic superior endplate compression fracture of L1 with approximately 20% loss of vertebral body height. This is seen on prior chest CT. Mild acute inferior endplate compression fracture of L2 without significant loss of vertebral body height. This is new since prior 09/09/2021 CT but is age indeterminate. Given history of recent trauma, these may be acute. Distended gallbladder with wall thickening and pericholecystic fluid. This is nonspecific but can be seen with acute cholecystitis in the proper clinical setting. Incidentally noted nonobstructing stone in the mid pole left kidney. Evidence of remote pancreatitis. Findings suggesting atypical pneumonia in the lung bases. Please see separate chest CT.      CT THORACIC SPINE TRAUMA RECONSTRUCTION    Result Date: 1/1/2023  Chronic T8, T9, T11 and T12 compression fractures. Chronic burst compression fracture of T6. No significant change since prior chest CT. No new fractures of the thoracic spine. Physical Examination:        General appearance:  alert, cooperative and no distress on 4 liters of oxygen nasal cannula   Mental Status:  oriented to person, place and time and normal affect +anxiety  Lungs:  wheezing and decreased breath sounds  to auscultation bilaterally, normal effort on 4 liters of oxygen   Heart:  regular rate and rhythm, no murmur  Abdomen:  soft, nontender, nondistended, normal bowel sounds, no masses, hepatomegaly, splenomegaly  Extremities:  no edema, redness, tenderness in the calves  Skin:  no gross lesions, rashes, induration    Assessment:        Hospital Problems             Last Modified POA    * (Principal) Acute on chronic respiratory failure with hypoxia (HCC) 7/6/8882 Yes    Metabolic encephalopathy 1/9/5084 Yes    Altered mental status 1/8/2023 Yes    Nicotine addiction 1/8/2023 Yes    COPD exacerbation ([address] 1/8/2023 Yes    Chronic pancreatitis ([facility] [address] 1/8/2023 Yes    Pneumonia of both lungs due to infectious organism 1/8/2023 Yes    Moderate episode of recurrent major depressive disorder ([facility] [address] 1/8/2023 Yes     Plan:        Acute on chronic hypoxic and hypercapnic respiratory failure requiring Ventilator support due to pneumonia/COPD exacerbation: on 4 liters of NC at home. Continue steroid taper, Zosyn, Duo neb, mucin ex. Chronic pancreatitis: Zenpep TID with meals  Metabolic encephalopathy:at baseline   NCNC anemia: Hgb 10.6. ferrous sulfate on discharge, charis  Anxiety and depression: Continue Buspar, Carbamazepine, remeron add ativan for panic attack  Leukocytosis: likely due to steroid use. Stop metoprolol, EF is normal and concern that this can aggravate her COPD  Will give scheduled and as needed nicotine   At home she takes Reglan, Topamax, carbamazepine, BuSpar, Atarax and Remeron as needed. Long discussion with patient regarding psych medications. Explained side effects including Qtc prolongation/arrhythmia with multiple QTC prolonging drugs and, extraparametal side effects with Reglan/antipsychotic use. At this time, patient says she has been on this for several years and would like to continue. She has severe anxiety and her medications have been titrated by psychiatrist      Dispo: still weak, will have PMR evaluate patient given recent ICU stay, intubation.  She lives alone no support  Emily Stephens,   1/9/2023  3:02 PM

## 2023-01-09 NOTE — CARE COORDINATION
Consult for consideration of rehab/SBIRT  Met with pt this date was alert and oriented  Pt states she does not drink alcohol anymore. Has been sober for the past 3 years. Smokes marijuana occasionally  Past rehab at [place] in 2020. Attended AA meetings at that time also. No recent intervention. Pt states \"I'm good\". Pt denies having any feelings of depression at this time. Alcohol Screening and Brief Intervention        No results for input(s): ALC in the last 72 hours. Alcohol Pre-screening     (WOMEN ONLY) How many times in the past year have you had 4 or more drinks in a day?: None    Alcohol Screening Audit       Drug Pre-Screening   How many times in the past year have you used a recreational drug or used a prescription medication for nonmedical reasons?: 1 or more    Drug Screening DAST       Mood Pre-Screening (PHQ-2)  During the past two weeks, have you been bothered by little interest or pleasure in doing things?: No  During the past two weeks, have you been bothered by feeling down, depressed, or hopeless?: No    Mood Pre-Screening (PHQ-9)         I have interviewed Mayi Seay, [id number] regarding  Her alcohol consumption/drug use and risk for excessive use. Screenings were positive. Patient  Declined intervention at this time.      Deferred []    Completed on: 1/9/2023   JOANNE RENDON

## 2023-01-10 PROCEDURE — 2700000000 HC OXYGEN THERAPY PER DAY

## 2023-01-10 PROCEDURE — 6370000000 HC RX 637 (ALT 250 FOR IP): Performed by: STUDENT IN AN ORGANIZED HEALTH CARE EDUCATION/TRAINING PROGRAM

## 2023-01-10 PROCEDURE — 6370000000 HC RX 637 (ALT 250 FOR IP): Performed by: INTERNAL MEDICINE

## 2023-01-10 PROCEDURE — 6360000002 HC RX W HCPCS

## 2023-01-10 PROCEDURE — 99232 SBSQ HOSP IP/OBS MODERATE 35: CPT | Performed by: NURSE PRACTITIONER

## 2023-01-10 PROCEDURE — 6370000000 HC RX 637 (ALT 250 FOR IP): Performed by: NURSE PRACTITIONER

## 2023-01-10 PROCEDURE — 6360000002 HC RX W HCPCS: Performed by: STUDENT IN AN ORGANIZED HEALTH CARE EDUCATION/TRAINING PROGRAM

## 2023-01-10 PROCEDURE — 97116 GAIT TRAINING THERAPY: CPT

## 2023-01-10 PROCEDURE — 2580000003 HC RX 258: Performed by: NURSE PRACTITIONER

## 2023-01-10 PROCEDURE — 97110 THERAPEUTIC EXERCISES: CPT

## 2023-01-10 PROCEDURE — 99232 SBSQ HOSP IP/OBS MODERATE 35: CPT | Performed by: INTERNAL MEDICINE

## 2023-01-10 PROCEDURE — 2580000003 HC RX 258: Performed by: STUDENT IN AN ORGANIZED HEALTH CARE EDUCATION/TRAINING PROGRAM

## 2023-01-10 PROCEDURE — 2060000000 HC ICU INTERMEDIATE R&B

## 2023-01-10 PROCEDURE — 94640 AIRWAY INHALATION TREATMENT: CPT

## 2023-01-10 PROCEDURE — 6360000002 HC RX W HCPCS: Performed by: NURSE PRACTITIONER

## 2023-01-10 RX ADMIN — PIPERACILLIN AND TAZOBACTAM 3375 MG: 3; .375 INJECTION, POWDER, FOR SOLUTION INTRAVENOUS at 00:25

## 2023-01-10 RX ADMIN — IPRATROPIUM BROMIDE AND ALBUTEROL SULFATE 1 AMPULE: .5; 3 SOLUTION RESPIRATORY (INHALATION) at 08:40

## 2023-01-10 RX ADMIN — SODIUM CHLORIDE 30 ML/HR: 9 INJECTION, SOLUTION INTRAVENOUS at 17:30

## 2023-01-10 RX ADMIN — LORAZEPAM 1 MG: 1 TABLET ORAL at 18:05

## 2023-01-10 RX ADMIN — SUCRALFATE 1 G: 1 TABLET ORAL at 22:22

## 2023-01-10 RX ADMIN — Medication 100 MG: at 08:27

## 2023-01-10 RX ADMIN — CARBAMAZEPINE 100 MG: 100 SUSPENSION ORAL at 22:26

## 2023-01-10 RX ADMIN — PREGABALIN 200 MG: 100 CAPSULE ORAL at 22:22

## 2023-01-10 RX ADMIN — PANCRELIPASE LIPASE, PANCRELIPASE PROTEASE, PANCRELIPASE AMYLASE 40000 UNITS: 20000; 63000; 84000 CAPSULE, DELAYED RELEASE ORAL at 17:26

## 2023-01-10 RX ADMIN — PANCRELIPASE LIPASE, PANCRELIPASE PROTEASE, PANCRELIPASE AMYLASE 40000 UNITS: 20000; 63000; 84000 CAPSULE, DELAYED RELEASE ORAL at 08:26

## 2023-01-10 RX ADMIN — PRAZOSIN HYDROCHLORIDE 1 MG: 1 CAPSULE ORAL at 22:22

## 2023-01-10 RX ADMIN — IPRATROPIUM BROMIDE AND ALBUTEROL SULFATE 1 AMPULE: .5; 3 SOLUTION RESPIRATORY (INHALATION) at 19:37

## 2023-01-10 RX ADMIN — FENTANYL CITRATE 50 MCG: 50 INJECTION INTRAMUSCULAR; INTRAVENOUS at 00:21

## 2023-01-10 RX ADMIN — LORAZEPAM 1 MG: 1 TABLET ORAL at 22:24

## 2023-01-10 RX ADMIN — SODIUM CHLORIDE, PRESERVATIVE FREE 10 ML: 5 INJECTION INTRAVENOUS at 22:15

## 2023-01-10 RX ADMIN — IPRATROPIUM BROMIDE AND ALBUTEROL SULFATE 1 AMPULE: .5; 3 SOLUTION RESPIRATORY (INHALATION) at 12:49

## 2023-01-10 RX ADMIN — IPRATROPIUM BROMIDE AND ALBUTEROL SULFATE 1 AMPULE: .5; 3 SOLUTION RESPIRATORY (INHALATION) at 16:29

## 2023-01-10 RX ADMIN — BUSPIRONE HYDROCHLORIDE 30 MG: 15 TABLET ORAL at 22:23

## 2023-01-10 RX ADMIN — TOPIRAMATE 25 MG: 25 TABLET, FILM COATED ORAL at 08:28

## 2023-01-10 RX ADMIN — TOPIRAMATE 25 MG: 25 TABLET, FILM COATED ORAL at 22:22

## 2023-01-10 RX ADMIN — BACLOFEN 10 MG: 10 TABLET ORAL at 17:26

## 2023-01-10 RX ADMIN — PIPERACILLIN AND TAZOBACTAM 3375 MG: 3; .375 INJECTION, POWDER, FOR SOLUTION INTRAVENOUS at 17:31

## 2023-01-10 RX ADMIN — PIPERACILLIN AND TAZOBACTAM 3375 MG: 3; .375 INJECTION, POWDER, FOR SOLUTION INTRAVENOUS at 08:34

## 2023-01-10 RX ADMIN — SODIUM CHLORIDE TAB 1 GM 1 G: 1 TAB at 17:26

## 2023-01-10 RX ADMIN — SUCRALFATE 1 G: 1 TABLET ORAL at 17:26

## 2023-01-10 RX ADMIN — MIRTAZAPINE 15 MG: 15 TABLET, FILM COATED ORAL at 22:23

## 2023-01-10 RX ADMIN — ATORVASTATIN CALCIUM 20 MG: 20 TABLET, FILM COATED ORAL at 08:25

## 2023-01-10 RX ADMIN — PANTOPRAZOLE SODIUM 40 MG: 40 TABLET, DELAYED RELEASE ORAL at 22:23

## 2023-01-10 RX ADMIN — SODIUM CHLORIDE, PRESERVATIVE FREE 10 ML: 5 INJECTION INTRAVENOUS at 22:28

## 2023-01-10 RX ADMIN — METOCLOPRAMIDE 5 MG: 5 TABLET ORAL at 08:25

## 2023-01-10 RX ADMIN — BACLOFEN 10 MG: 10 TABLET ORAL at 08:25

## 2023-01-10 RX ADMIN — METOCLOPRAMIDE 5 MG: 5 TABLET ORAL at 12:18

## 2023-01-10 RX ADMIN — SODIUM CHLORIDE 30 ML: 9 INJECTION, SOLUTION INTRAVENOUS at 08:34

## 2023-01-10 RX ADMIN — AMLODIPINE BESYLATE 10 MG: 10 TABLET ORAL at 08:25

## 2023-01-10 RX ADMIN — METOCLOPRAMIDE 5 MG: 5 TABLET ORAL at 17:26

## 2023-01-10 RX ADMIN — FENTANYL CITRATE 50 MCG: 50 INJECTION INTRAMUSCULAR; INTRAVENOUS at 10:52

## 2023-01-10 RX ADMIN — SUCRALFATE 1 G: 1 TABLET ORAL at 12:18

## 2023-01-10 RX ADMIN — SODIUM CHLORIDE TAB 1 GM 1 G: 1 TAB at 08:27

## 2023-01-10 RX ADMIN — ENOXAPARIN SODIUM 40 MG: 100 INJECTION SUBCUTANEOUS at 08:30

## 2023-01-10 RX ADMIN — ROPINIROLE HYDROCHLORIDE 1 MG: 1 TABLET, FILM COATED ORAL at 22:28

## 2023-01-10 RX ADMIN — BUSPIRONE HYDROCHLORIDE 30 MG: 15 TABLET ORAL at 08:25

## 2023-01-10 RX ADMIN — CARBAMAZEPINE 100 MG: 100 SUSPENSION ORAL at 08:29

## 2023-01-10 RX ADMIN — PREDNISONE 40 MG: 20 TABLET ORAL at 08:27

## 2023-01-10 RX ADMIN — SODIUM CHLORIDE TAB 1 GM 1 G: 1 TAB at 12:18

## 2023-01-10 RX ADMIN — PANCRELIPASE LIPASE, PANCRELIPASE PROTEASE, PANCRELIPASE AMYLASE 40000 UNITS: 20000; 63000; 84000 CAPSULE, DELAYED RELEASE ORAL at 12:18

## 2023-01-10 RX ADMIN — SUCRALFATE 1 G: 1 TABLET ORAL at 08:27

## 2023-01-10 RX ADMIN — FOLIC ACID 1 MG: 1 TABLET ORAL at 08:25

## 2023-01-10 ASSESSMENT — PAIN DESCRIPTION - ORIENTATION
ORIENTATION: LOWER
ORIENTATION: LOWER

## 2023-01-10 ASSESSMENT — ENCOUNTER SYMPTOMS
DIARRHEA: 0
PHOTOPHOBIA: 0
ALLERGIC/IMMUNOLOGIC NEGATIVE: 1
TROUBLE SWALLOWING: 0
WHEEZING: 0
VOICE CHANGE: 0
VOMITING: 0
SORE THROAT: 0
COUGH: 1
ABDOMINAL PAIN: 0
EYE REDNESS: 0
SHORTNESS OF BREATH: 1

## 2023-01-10 ASSESSMENT — PAIN SCALES - GENERAL
PAINLEVEL_OUTOF10: 7
PAINLEVEL_OUTOF10: 5
PAINLEVEL_OUTOF10: 7

## 2023-01-10 ASSESSMENT — PAIN DESCRIPTION - LOCATION
LOCATION: BACK
LOCATION: BACK;LEG

## 2023-01-10 ASSESSMENT — PAIN DESCRIPTION - DESCRIPTORS: DESCRIPTORS: ACHING

## 2023-01-10 NOTE — PLAN OF CARE
Problem: Respiratory - Adult  Goal: Achieves optimal ventilation and oxygenation  1/9/2023 1958 by Robbie Dumont RCP  Outcome: Progressing   BRONCHOSPASM/BRONCHOCONSTRICTION     [x]         IMPROVE AERATION/BREATH SOUNDS  [x]   ADMINISTER BRONCHODILATOR THERAPY AS APPROPRIATE  [x]   ASSESS BREATH SOUNDS  []   IMPLEMENT AEROSOL/MDI PROTOCOL  [x]   PATIENT EDUCATION AS NEEDED

## 2023-01-10 NOTE — PROGRESS NOTES
[facility]  Office: [address], DO, Marium Males, DO, Walter Loop, DO, Nuvia Copeland Blood, DO, Venkatesh Francis MD, Dillan Natarajan MD, Tanmay Murillo MD, Daphney Goodwin MD,  Sharda Acuna MD, Chetna Arce MD, Tfif Tinoco, DO, Hi Shannon MD,  Nahum Mejia MD, Johan Mason MD, Claudean Budds, DO, Frandy Marie MD, Ti Albrecht MD, Dilcia Teran, DO, Sophia Noe MD, Mc Lee MD, Padmini Farris MD, Cinthya Telles MD, Kristie Guadarrama, DO, Elan Hayden MD, Shantel Pritchett MD, Niya Mcneal, Juliane Viramontes, CNP, Heena Campos, CNP, Mallorie Yee, CNP,  Jorge L Diaz, [facility], Robina Contreras, CNP, Fernand Cockayne, CNP, Qing Coleman, CNP, Jay Austin, CNP, Joey Kwong, CNP, Linda Hoover PAZenobiaC, Stephanie De Guzman, CNS, Zac Garcia, CNP, Myriam Acevedo, [address] [place] [address]    Progress Note    1/10/2023    8:12 AM    Name:   Ashtyn Almanza  MRN:     [id number]     Acct:      [de-identified]   Room:   [address] [place] [address] Day:  9  Admit Date:  1/1/2023 12:55 PM    PCP:   Karen Sanchez MD  Code Status:  Full Code    Subjective:     C/C:   Chief Complaint   Patient presents with    Fatigue    Fall    Respiratory Distress     Interval History Status: improved. Seen and examined. She is up in chair. Has sitter at bedside for reported \"shaking while sleeping\"  Had presumed panic attack 2 nights ago. Denies any further instances. Denies chest pain, fever, chills. No SOB. Wearing home supplemental oxygen dose of 3L/m. Eating well. No pain. Requesting nicotine lozenge in addition to nicotine patch. Continue to request transfer to nursing facility to increase strength and mobility.      Brief History:     Bridgette Ledesma is a pleasant, cooperative 47year old female with past medical history significant for chronic pancreatitis, polysubstance abuse, ethanol, major depressive disorder with multiple psych medication came into the ED with altered mental status and desatted to 42%. Lab in the ED was remarkable for leukocytosis 17.5 and UA concerning for UTI. Trauma work-up was negative CT pulmonary was also negative but did demonstrate diffuse groundglass opacities with mosaic type distribution. Patient was transferred to the ICU for further respiratory support. And was put on alternating BiPAP with high flow nasal cannula. Cardio was consulted in the ICU for uptrending troponin which eventually plateaued and echo was done which was unremarkable for any ischemic changes. But on 1/4/2023, patient became altered and was in the setting AVM in the BiPAP with 100% FiO2. A decision was made to intubate the patient and put him on ventilator. Patient was put on IV Zosyn for suspected pneumonia. Currently on Zosyn. Subsequently patient respiratory status progressively improved. On 1/6/2023, patient CPAP well and was ready for extubation. On 1/7/2023, patient was extubated and was ready to be transferred. Currently she is on 3 L of nasal cannula oxygen which is her home dose. Review of Systems:     Constitutional:  negative for chills, fevers, sweats  Respiratory:  negative for cough, dyspnea on exertion, shortness of breath, wheezing  Cardiovascular:  negative for chest pain, chest pressure/discomfort, lower extremity edema, palpitations  Gastrointestinal:  negative for abdominal pain, constipation, diarrhea, nausea, vomiting  Neurological:  negative for dizziness, headache    Medications:      Allergies:  No Known Allergies    Current Meds:   Scheduled Meds:    amLODIPine  10 mg Oral Daily    folic acid  1 mg Oral Daily    metoclopramide  5 mg Oral TID AC    nicotine  1 patch TransDERmal Daily    sodium chloride  1 g Oral TID     predniSONE  40 mg Oral Daily    Followed by    Nabila Mello ON 1/11/2023] predniSONE  30 mg Oral Daily    Followed by    Nabila Mello ON 1/14/2023] predniSONE  20 mg Oral Daily Followed by    Sade Richardsonr ON 1/17/2023] predniSONE  10 mg Oral Daily    prazosin  1 mg Oral Nightly    pantoprazole  40 mg Oral Nightly    carBAMazepine  100 mg Oral BID    topiramate  25 mg Oral BID    ipratropium-albuterol  1 ampule Inhalation Q4H WA    piperacillin-tazobactam  3,375 mg IntraVENous Q8H    atorvastatin  20 mg Oral Daily    busPIRone  30 mg Oral BID    Pancrelipase (Lip-Prot-Amyl)  40,000 Units Oral TID WC    mirtazapine  15 mg Oral Nightly    pregabalin  200 mg Oral Nightly    rOPINIRole  1 mg Oral Nightly    sucralfate  1 g Oral 4x Daily    sodium chloride flush  5-40 mL IntraVENous 2 times per day    enoxaparin  40 mg SubCUTAneous Daily    sodium chloride flush  5-40 mL IntraVENous 2 times per day    thiamine  100 mg Oral Daily    magnesium sulfate  2,000 mg IntraVENous Once     Continuous Infusions:    sodium chloride 100 mL/hr at 01/09/23 1012    sodium chloride 100 mL/hr at 01/09/23 1816     PRN Meds: metoprolol, LORazepam, baclofen, nicotine polacrilex, loperamide, lidocaine viscous hcl, fentanNYL, hydrOXYzine HCl, sodium chloride flush, sodium chloride, polyethylene glycol, acetaminophen **OR** acetaminophen, potassium chloride **OR** potassium alternative oral replacement **OR** potassium chloride, magnesium sulfate, sodium chloride flush, sodium chloride    Data:     Past Medical History:   has a past medical history of Acute respiratory failure with hypoxia ([facility] Alcohol withdrawal syndrome, with delirium ([facility] Alcoholism ([address] Anal warts, Anemia, Anxiety, Astrocytoma ([facility] - diagnosed at age 24, the patient underwent 2 surgical resections without known recurrence, Bandemia, Closed fracture of lateral portion of left tibial plateau, Condyloma, COPD (chronic obstructive pulmonary disease) ([address] Depression, Dysphagia, GI bleed, Hypertension, Memory loss, Oxygen dependent, Pain, joint, ankle and foot, Pancreatic lesion, Perianal wart, Peripheral neuropathy, Seizures ([address] Tension headache, Under care of team, Under care of team, Under care of team, Under care of team, Under care of team, Under care of team, Wears glasses, Wears partial dentures, and Wellness examination. Social History:   reports that she has been smoking cigarettes. She has a 30.00 pack-year smoking history. She has never used smokeless tobacco. She reports that she does not currently use alcohol. She reports current drug use. Frequency: 2.00 times per week. Family History:   Family History   Problem Relation Age of Onset    Other Father     Cancer Maternal Grandmother     Heart Disease Paternal Grandmother     Esophageal Cancer Maternal Aunt     Arthritis Mother        Vitals:  /89   Pulse 77   Temp 97.9 °F (36.6 °C) (Oral)   Resp 20   Ht 5' 6\" (1.676 m)   Wt 134 lb 0.6 oz (60.8 kg)   SpO2 97%   BMI 21.63 kg/m²   Temp (24hrs), Av.5 °F (36.9 °C), Min:97.9 °F (36.6 °C), Max:98.8 °F (37.1 °C)    Recent Labs     23  1214   POCGLU 162*       I/O (24Hr):   No intake or output data in the 24 hours ending 01/10/23 0812    Labs:  Hematology:  Recent Labs     23   WBC 12.3* 12.9*   RBC 3.72* 3.75*   HGB 10.6* 10.7*   HCT 33.5* 34.6*   MCV 90.1 92.3   MCH 28.5 28.5   MCHC 31.6 30.9   RDW 15.6* 15.9*   * 478*   MPV 10.6 10.6     Chemistry:  Recent Labs     23  0328    137   K 3.7 3.5*    106   CO2 23 21   GLUCOSE 149* 147*   BUN 15 10   CREATININE 0.40* 0.39*   ANIONGAP 12 10   LABGLOM >60 >60   CALCIUM 8.4* 8.4*     Recent Labs     23  1214   POCGLU 162*     ABG:  Lab Results   Component Value Date/Time    POCPH 7.401 2023 04:38 AM    POCPCO2 45.4 2023 04:38 AM    POCPO2 103.9 2023 04:38 AM    POCHCO3 28.2 2023 04:38 AM    NBEA 8 2023 05:19 PM    PBEA 3 2023 04:38 AM    DEG9ZSI NOT REPORTED 2021 03:38 PM    QOWF7RUK 98 2023 04:38 AM    FIO2 40.0 2023 04:38 AM     Lab Results   Component Value Date/Time    SPECIAL ac 10ml 01/01/2023 01:06 PM     Lab Results   Component Value Date/Time    CULTURE NORMAL RESPIRATORY SUSIE MODERATE GROWTH 01/04/2023 05:18 PM       Radiology:  XR CHEST (SINGLE VIEW FRONTAL)    Result Date: 1/4/2023  Small right pleural effusion with bibasilar airspace opacities. This could represent atelectasis versus pneumonia     XR CHEST PORTABLE    Result Date: 1/5/2023  Improved aeration of the lung bases. XR CHEST PORTABLE    Result Date: 1/4/2023  Endotracheal tube tip is approximately 4 cm above the roma. OG tube tip is in the stomach. Physical Examination:        General appearance:  alert, cooperative and no distress  Mental Status:  oriented to person, place and time and normal affect  Lungs:  Diminished posteriorly. O2 on. Heart:  regular rate and rhythm, no murmur  Abdomen:  soft, nontender, nondistended, normal bowel sounds, no masses, hepatomegaly, splenomegaly  Extremities:  no edema, redness, tenderness in the calves  Skin:  no gross lesions, rashes, induration    Assessment:        Hospital Problems             Last Modified POA    * (Principal) Acute on chronic respiratory failure with hypoxia ([address] 1/8/2023 Yes    Altered mental status 1/8/2023 Yes    Nicotine addiction 1/8/2023 Yes    COPD exacerbation ([address] 9/4/5543 Yes    Metabolic encephalopathy 3/1/9416 Yes    Chronic pancreatitis ([address] 1/8/2023 Yes    Pneumonia of both lungs due to infectious organism 1/8/2023 Yes    Moderate episode of recurrent major depressive disorder ([address] 1/8/2023 Yes       Plan:        COPD exacerbation with PNA s/p intubation. Continue abx and prednisone taper. Supplemental oxygen. Nebulizers prn. Home medications and prn medications for mental health and anxiety. Minipress, Buspar, Atarax and Ativan prn. Chronic pancreatitis. On Zenpep. Smoking cessation education. Nicotine patch on.   PT/OT eval and treat.    Dispo: Requesting rehab     Kassi Gordillo, APRN - NP  1/10/2023  8:12 AM

## 2023-01-10 NOTE — PROGRESS NOTES
PULMONARY & CRITICAL CARE MEDICINE PROGRESS  NOTE     Patient:  Augusta Simeon  MRN: [id number]  Admit date: 1/1/2023  Primary Care Physician: Tierra Castro MD  Consulting Physician: Nguyễn Kingston DO  CODE Status: Full Code  LOS: 9    SUBJECTIVE     I personally interviewed/examined the patient, reviewed interval history and interpreted all available radiographic, laboratory data at the time of service. Chief Compliant/Reason for Initial Consult:   Acute hypoxic hypercapnic respiratory failure on chronic hypoxic respiratory failure/pneumonia    Brief Hospital Course: The patient is a 47 y.o. female she has history of COPD/chronic hypoxic respiratory failure on nocturnal O2/history of chronic pancreatitis/depression and generalized anxiety disorder (per chart)/history of alcohol use/hypertension. Brought to the emergency room with altered mental status severe hypoxia initially treated with nonrebreather was apparently agitated and combative in the emergency room slightly improved initial CT scan was negative for pulm embolism but shows bilateral areas of groundglass changes diffusely concern for aspiration. Patient was admitted to ICU initially was on BiPAP high flow but ultimately requiring intubation on 01/04/2023 requiring sedation/apparently was on CIWA protocol and treated with Zosyn for bilateral pneumonia chest x-ray shows right lower lobe atelectasis/consolidation/infiltrate and lower left lower lobe infiltrate. Patient ultimately was weaned down on sedation arousable and was extubated on 01/07/2023 and was transferred out of ICU on 01/07/2023. Interval History:  01/10/23  Has sitter   No sob   Cough , no sputum or hemoptysis   No active wheeze   Review of Systems:  Review of Systems   Constitutional:  Positive for fatigue. Negative for appetite change and unexpected weight change.    HENT:  Negative for postnasal drip, sore throat, trouble swallowing and voice change. Eyes:  Negative for photophobia, redness and visual disturbance. Respiratory:  Positive for cough and shortness of breath. Negative for wheezing. Cardiovascular:  Negative for chest pain and leg swelling. Gastrointestinal:  Negative for abdominal pain, diarrhea and vomiting. Endocrine: Negative for polydipsia, polyphagia and polyuria. Genitourinary:  Negative for difficulty urinating, dysuria, frequency and hematuria. Musculoskeletal: Negative. Allergic/Immunologic: Negative. Neurological:  Negative for dizziness, tremors, speech difficulty and headaches. Hematological:  Negative for adenopathy. Does not bruise/bleed easily. Psychiatric/Behavioral: Negative. OBJECTIVE     VITAL SIGNS:   LAST-  /71   Pulse (!) 106   Temp 98.1 °F (36.7 °C) (Oral)   Resp 18   Ht 5' 6\" (1.676 m)   Wt 134 lb 0.6 oz (60.8 kg)   SpO2 91%   BMI 21.63 kg/m²   8-24 HR RANGE-  TEMP Temp  Av.3 °F (36.8 °C)  Min: 97.9 °F (36.6 °C)  Max: 98.8 °F (42.1 °C)   BP Systolic (03QIK), ZBN:293 , Min:111 , DDB:900      Diastolic (86BLG), VHZ:86, Min:71, Max:89     PULSE Pulse  Av.7  Min: 77  Max: 106   RR Resp  Av.7  Min: 16  Max: 19   O2 SAT SpO2  Av.3 %  Min: 87 %  Max: 96 %   OXYGEN DELIVERY O2 Flow Rate (L/min)  Avg: 3 L/min  Min: 3 L/min  Max: 3 L/min     Systemic Examination:   Physical Exam  Head and neck atraumatic, normocephalic    Lymph nodes-no cervical, supraclavicular lymphadenopathy    Neck-no JVP elevation    Lungs - AP diameter of chest increased. Thoracic expansion and diaphragmatic excursion diminished. BS diminished and expiratory phase prolonged. No dullness to percussion or tenderness to palpation. No bronchial breath sounds . CVS- S1, S2 regular. No S3 no S4, no murmurs    Abdomen-nontender, nondistended. Bowel sounds are present.   No organomegaly    Lower extremity-no edema    Upper extremity-no edema    Neurological-grossly normal cranial nerves. No overt motor deficit      DATA REVIEW     Medications:  Scheduled Meds:   amLODIPine  10 mg Oral Daily    folic acid  1 mg Oral Daily    metoclopramide  5 mg Oral TID AC    nicotine  1 patch TransDERmal Daily    sodium chloride  1 g Oral TID WC    [START ON 1/11/2023] predniSONE  30 mg Oral Daily    Followed by    Jarrod Salazar ON 1/14/2023] predniSONE  20 mg Oral Daily    Followed by    Jarrod Salazar ON 1/17/2023] predniSONE  10 mg Oral Daily    prazosin  1 mg Oral Nightly    pantoprazole  40 mg Oral Nightly    carBAMazepine  100 mg Oral BID    topiramate  25 mg Oral BID    ipratropium-albuterol  1 ampule Inhalation Q4H WA    piperacillin-tazobactam  3,375 mg IntraVENous Q8H    atorvastatin  20 mg Oral Daily    busPIRone  30 mg Oral BID    Pancrelipase (Lip-Prot-Amyl)  40,000 Units Oral TID WC    mirtazapine  15 mg Oral Nightly    pregabalin  200 mg Oral Nightly    rOPINIRole  1 mg Oral Nightly    sucralfate  1 g Oral 4x Daily    sodium chloride flush  5-40 mL IntraVENous 2 times per day    enoxaparin  40 mg SubCUTAneous Daily    sodium chloride flush  5-40 mL IntraVENous 2 times per day    thiamine  100 mg Oral Daily    magnesium sulfate  2,000 mg IntraVENous Once     Continuous Infusions:   sodium chloride 30 mL (01/10/23 0834)    sodium chloride 100 mL/hr at 01/09/23 1816     LABS:-  ABG:   No results for input(s): POCPH, POCPCO2, POCPO2, POCHCO3, OLOO2JAX in the last 72 hours.     CBC:   Recent Labs     01/08/23 0458 01/09/23 0328   WBC 12.3* 12.9*   HGB 10.6* 10.7*   HCT 33.5* 34.6*   MCV 90.1 92.3   * 478*   LYMPHOPCT 32 22*   RBC 3.72* 3.75*   MCH 28.5 28.5   MCHC 31.6 30.9   RDW 15.6* 15.9*       BMP:   Recent Labs     01/08/23 0458 01/09/23  0328    137   K 3.7 3.5*    106   CO2 23 21   BUN 15 10   CREATININE 0.40* 0.39*   GLUCOSE 149* 147*       Liver Function Test:   No results for input(s): PROT, LABALBU, ALT, AST, GGT, ALKPHOS, BILITOT in the last 72 hours. Amylase/Lipase:  No results for input(s): AMYLASE, LIPASE in the last 72 hours. Coagulation Profile:   No results for input(s): INR, PROTIME, APTT in the last 72 hours. Cardiac Enzymes:  No results for input(s): CKTOTAL, CKMB, CKMBINDEX, TROPONINI in the last 72 hours. Lactic Acid:  No results found for: LACTA  BNP:   No results found for: BNP  D-Dimer:  Lab Results   Component Value Date    DDIMER 2.17 12/15/2018     Others:   Lab Results   Component Value Date    TSH 1.40 01/02/2023     Lab Results   Component Value Date    BRADLEY NEGATIVE 04/13/2021    SEDRATE 37 (H) 02/03/2021    .7 (H) 07/27/2021     No results found for: Aline Arrow  Lab Results   Component Value Date    IRON 35 (L) 01/08/2023    TIBC 242 (L) 01/08/2023    FERRITIN 182 (H) 01/08/2023     No results found for: SPEP, UPEP  Lab Results   Component Value Date/Time     21 07/21/2021 04:00 PM       Input/Output:  No intake or output data in the 24 hours ending 01/10/23 1704      Microbiology:  No results for input(s): SPECDESC, SPECDESC, SPECIAL, CULTURE, CULTURE, STATUS, ORG, CDIFFTOXPCR, CAMPYLOBPCR, SALMONELLAPC, SHIGAPCR, SHIGELLAPCR, MPNEUG, MPNEUM, LACTOQL in the last 72 hours. Pathology:    Radiology reports:  XR CHEST PORTABLE   Final Result   Improved aeration of the lung bases. XR CHEST PORTABLE   Final Result   Endotracheal tube tip is approximately 4 cm above the roma. OG tube tip is   in the stomach. XR CHEST (SINGLE VIEW FRONTAL)   Final Result   Small right pleural effusion with bibasilar airspace opacities. This could   represent atelectasis versus pneumonia         US GALLBLADDER RUQ   Final Result   Overall findings suggestive of diffuse hepatic parenchymal disease with   evidence to indicate underlying cirrhosis. Trace pericholecystic fluid is nonspecific and could be related to underlying   diffuse hepatic disease/cirrhosis.   Otherwise, no other sonographic findings   to indicate the presence of acute cholecystitis. CT CHEST PULMONARY EMBOLISM W CONTRAST   Final Result   1. No evidence of pulmonary embolism. 2. Hazy vascular appearance with diffuse ground-glass background parenchymal   pattern with mosaic type distribution suggesting pulmonary venous congestion. Additionally, there is venous enhancement of the liver suggesting secondary   congestive change. Overall appearance suggest pulmonary edema. 3. Fatty liver. Although incompletely included in the field of view suspect   hepatomegaly. 4. Pancreatic calcifications suggesting chronic pancreatitis. 5. Enlarged right hilar lymph node. CT ABDOMEN PELVIS W IV CONTRAST Additional Contrast? None   Final Result   Findings suggesting stable subacute to chronic superior endplate compression   fracture of L1 with approximately 20% loss of vertebral body height. This is   seen on prior chest CT. Mild acute inferior endplate compression fracture of L2 without significant   loss of vertebral body height. This is new since prior 09/09/2021 CT but is   age indeterminate. Given history of recent trauma, these may be acute. Distended gallbladder with wall thickening and pericholecystic fluid. This   is nonspecific but can be seen with acute cholecystitis in the proper   clinical setting. Incidentally noted nonobstructing stone in the mid pole left kidney. Evidence of remote pancreatitis. Findings suggesting atypical pneumonia in the lung bases. Please see   separate chest CT. CT THORACIC SPINE TRAUMA RECONSTRUCTION   Final Result   Chronic T8, T9, T11 and T12 compression fractures. Chronic burst compression   fracture of T6. No significant change since prior chest CT. No new   fractures of the thoracic spine.          CT LUMBAR SPINE TRAUMA RECONSTRUCTION   Final Result   Findings suggesting stable subacute to chronic superior endplate compression   fracture of L1 with approximately 20% loss of vertebral body height. This is   seen on prior chest CT. Mild acute inferior endplate compression fracture of L2 without significant   loss of vertebral body height. This is new since prior 09/09/2021 CT but is   age indeterminate. Given history of recent trauma, these may be acute. Distended gallbladder with wall thickening and pericholecystic fluid. This   is nonspecific but can be seen with acute cholecystitis in the proper   clinical setting. Incidentally noted nonobstructing stone in the mid pole left kidney. Evidence of remote pancreatitis. Findings suggesting atypical pneumonia in the lung bases. Please see   separate chest CT. CT HEAD WO CONTRAST   Final Result   No acute intracranial abnormality. CT CERVICAL SPINE WO CONTRAST   Final Result   No acute traumatic injury of the cervical spine. XR CHEST PORTABLE   Final Result   Diffuse infiltrates with differential diagnosis to include multifocal   pneumonitis versus interstitial edema. Echocardiogram:   No results found for this or any previous visit. Cardiac Catheterization:   No results found for this or any previous visit. ASSESSMENT AND PLAN     Assessment:    //Acute hypoxic respiratory failure better   //Chronic hypoxic respiratory failure on home O2 usually use it at night  //COPD severity not known  //Tobacco dependence more than 30-pack-year history of smoking  //Bilateral pneumonia possible aspiration  //Right lower lobe atelectasis improve  //Bilateral small pleural effusion  //History of hypertension  //History of chronic pancreatitis  //History of seizure    Plan:    Continue duo neb   Restart Trelegy and albuterol on dc   Dc zosyn completed 10 days    complete prednisone taper       Yifan Hanks MD, M.D.    Pulmonary and Critical Care Medicine           1/10/2023, 5:04 PM    Please note that this chart was generated using voice recognition Dragon dictation software. Although every effort was made to ensure the accuracy of this automated transcription, some errors in transcription may have occurred.

## 2023-01-10 NOTE — PLAN OF CARE
Problem: Discharge Planning  Goal: Discharge to home or other facility with appropriate resources  Outcome: Progressing     Problem: Pain  Goal: Verbalizes/displays adequate comfort level or baseline comfort level  Outcome: Progressing     Problem: Skin/Tissue Integrity  Goal: Absence of new skin breakdown  Description: 1. Monitor for areas of redness and/or skin breakdown  2. Assess vascular access sites hourly  3. Every 4-6 hours minimum:  Change oxygen saturation probe site  4. Every 4-6 hours:  If on nasal continuous positive airway pressure, respiratory therapy assess nares and determine need for appliance change or resting period. Outcome: Progressing     Problem: Nutrition Deficit:  Goal: Optimize nutritional status  Outcome: Progressing  Flowsheets (Taken 1/10/2023 1018 by Leela Trejo, MS, RD, LD)  Nutrient intake appropriate for improving, restoring, or maintaining nutritional needs:   Assess nutritional status and recommend course of action   Monitor oral intake, labs, and treatment plans   Recommend appropriate diets, oral nutritional supplements, and vitamin/mineral supplements     Problem: Respiratory - Adult  Goal: Achieves optimal ventilation and oxygenation  Outcome: Progressing     Problem: Confusion  Goal: Confusion, delirium, dementia, or psychosis is improved or at baseline  Description: INTERVENTIONS:  1. Assess for possible contributors to thought disturbance, including medications, impaired vision or hearing, underlying metabolic abnormalities, dehydration, psychiatric diagnoses, and notify attending LIP  2. [place] high risk fall precautions, as indicated  3. Provide frequent short contacts to provide reality reorientation, refocusing and direction  4. Decrease environmental stimuli, including noise as appropriate  5. Monitor and intervene to maintain adequate nutrition, hydration, elimination, sleep and activity  6.  If unable to ensure safety without constant attention obtain sitter and review sitter guidelines with assigned personnel  7.  Initiate Psychosocial CNS and Spiritual Care consult, as indicated  Outcome: Progressing     Problem: Safety - Adult  Goal: Free from fall injury  Outcome: Progressing     Problem: ABCDS Injury Assessment  Goal: Absence of physical injury  Outcome: Progressing

## 2023-01-10 NOTE — PROGRESS NOTES
Physical Therapy  Facility/Department: [address] STEPDOWN  Daily Treatment Note    Name: Lilia Smith  : 1970  MRN: [id number]  Date of Service: 1/10/2023    Discharge Recommendations:  Patient would benefit from continued therapy after discharge   PT Equipment Recommendations  Equipment Needed: No      Patient Diagnosis(es): The primary encounter diagnosis was Hypoxia. Diagnoses of Altered mental status, unspecified altered mental status type, Pneumonia of both lungs due to infectious organism, unspecified part of lung, and Acute on chronic respiratory failure with hypoxia ([address] were also pertinent to this visit. Past Medical History:  has a past medical history of Acute respiratory failure with hypoxia ([address] Alcohol withdrawal syndrome, with delirium ([address] Alcoholism ([address] Anal warts, Anemia, Anxiety, 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24, the patient underwent 2 surgical resections without known recurrence, Bandemia, Closed fracture of lateral portion of left tibial plateau, Condyloma, COPD (chronic obstructive pulmonary disease) ([address] Depression, Dysphagia, GI bleed, Hypertension, Memory loss, Oxygen dependent, Pain, joint, ankle and foot, Pancreatic lesion, Perianal wart, Peripheral neuropathy, Seizures ([address] Tension headache, Under care of team, Under care of team, Under care of team, Under care of team, Under care of team, Under care of team, Wears glasses, Wears partial dentures, and Wellness examination. Past Surgical History:  has a past surgical history that includes Hysterectomy (); Brain tumor excision (); fracture surgery (Left, 2013); fracture surgery (Right); Upper gastrointestinal endoscopy (N/A, 10/22/2020); Colonoscopy (N/A, 10/22/2020); Endoscopic ultrasonography, GI (N/A, 2020); Upper gastrointestinal endoscopy (N/A, 2021); Hand surgery; CT ABSCESS DRAINAGE (2021); ERCP (N/A, 2021); ERCP (2021);  Upper gastrointestinal endoscopy (N/A, 09/08/2021); Spine surgery (N/A, 09/10/2021); ERCP (N/A, 10/21/2021); ERCP (10/21/2021); ERCP (10/21/2021); Colonoscopy (N/A, 11/19/2021); Anus surgery (N/A, 01/25/2022); endoscopic ultrasound (upper) (02/09/2022); Upper gastrointestinal endoscopy (N/A, 2/9/2022); and Upper gastrointestinal endoscopy (2/9/2022). Assessment   Body Structures, Functions, Activity Limitations Requiring Skilled Therapeutic Intervention: Decreased functional mobility ; Decreased body mechanics; Decreased balance;Decreased safe awareness;Decreased strength  Assessment: Pt ambulated 150 ft holding onto IV pole and hand rail with Luis/CGA as pt had 1 occurrence of scissoring with Luis to recover. Pt slightly impulsive today. Pt presents with general weakness and deconditioing impacting gait and dynamic standing balance. Pt will continue to benefit from PT intervention to progress activity and promote functional independence needed to regain prior level of independence. Therapy Prognosis: Good  Activity Tolerance  Activity Tolerance: Patient tolerated treatment well     Plan   Physcial Therapy Plan  General Plan:  (5-6x/wk)  Current Treatment Recommendations: Strengthening, Balance training, Functional mobility training, Transfer training, Neuromuscular re-education, Stair training, Gait training, Therapeutic activities, Safety education & training  Safety Devices  Type of Devices: Call light within reach, Gait belt, Nurse notified, Left in chair, Sitter present  Restraints  Restraints Initially in Place: No     Restrictions  Restrictions/Precautions  Restrictions/Precautions: Fall Risk, Seizure, Up as Tolerated  Required Braces or Orthoses?: No  Position Activity Restriction  Other position/activity restrictions: up with assist,  1:1 sitter in place, maintain SpO2 > 92 %     Subjective   General  Chart Reviewed: Yes  Response To Previous Treatment: Patient with no complaints from previous session.   Family / Caregiver Present: No  General Comment  Comments: Pt retired to recliner with call light and sitter present  Subjective  Subjective: RN and pt agreeable to PT. Pt in bed upon arrival. Pt report mild back pain and stiffness , not limiting ability to participate. Pt pleasant and cooperative t/o      Vision/Hearing  Vision  Vision: Impaired  Vision Exceptions: Wears glasses at all times    Cognition   Orientation  Overall Orientation Status: Within Functional Limits  Cognition  Overall Cognitive Status: Exceptions  Arousal/Alertness: Appropriate responses to stimuli  Following Commands: Follows all commands without difficulty  Attention Span: Appears intact  Memory: Appears intact  Safety Judgement: Decreased awareness of need for assistance;Decreased awareness of need for safety  Insights: Decreased awareness of deficits  Initiation: Requires cues for some  Cognition Comment: Pt able to make needs known, understanding deficits; Pt slightly impulsive. Objective   Bed mobility  Supine to Sit: Supervision  Sit to Supine: Unable to assess (pt retired to recliner)  [employer] Mobility Comments: Pt remain under seizure precautions with close supervision for safety. Assessed with HOB slightly elevated. Pt impulsive  Transfers  Sit to Stand: Stand by assistance  Stand to Sit: Stand by assistance  Bed to Chair: Stand by assistance  Comment: assessed without AD. Pt stood up out of recliner to walk towards window in room impulsively without safety awareness of IV and O2. Ambulation  Surface: Level tile  Device: Dinh rail (IV pole)  Assistance: Contact guard assistance;Minimal assistance  Quality of Gait: slightly impulsive, scissoring x 1 with Luis to recover, slight deviating from path  Gait Deviations: Decreased step length;Decreased step height  Distance: 150 ft  Comments: Pt held onto hand rail and IV pole during ambulation with scissoring of B LEs x 1 occurence with Luis to recover; otherwise CGA.   Verbal cues t/o to decrease marilyn and focus on LE placement, 3L O2     Balance  Posture: Good  Sitting - Static: Good  Sitting - Dynamic: Good  Standing - Static: Good;-  Standing - Dynamic: Fair;-  Comments: assessed amb with IV pole and holding onto hallway rail  Exercise Treatment: Seated LE exercise program: Long Arc Quads, hip abduction/adduction, heel/toe raises, and marches. Reps:  x 15 reps. B quad sets x 15 reps with tactile, demo and verbal cues for proper technique        OutComes Score  AM-PAC Score  AM-PAC Inpatient Mobility Raw Score : 22 (01/10/23 0913)  AM-PAC Inpatient T-Scale Score : 53.28 (01/10/23 0913)  Mobility Inpatient CMS 0-100% Score: 20.91 (01/10/23 0913)  Mobility Inpatient CMS G-Code Modifier : CJ (01/10/23 0913)     Goals  Short Term Goals  Time Frame for Short Term Goals: 14 visits  Short Term Goal 1: ambulate 200 ft mod I  Short Term Goal 2: ascend /descend 2 steps Mod I  Short Term Goal 3: tranfer alternate height surfaces mod I  Short Term Goal 4: Pt to tolerate 30 min ther ex/act to improve endurance  Patient Goals   Patient Goals : to return;home       Education  Patient Education  Education Given To: Patient  Education Provided: Role of Therapy;Plan of Care;Transfer Training;Equipment; Fall Prevention Strategies;Precautions  Education Provided Comments: verbal cues during gait training to decrease scissoring and risk for falls  Education Method: Demonstration;Verbal  Barriers to Learning: None  Education Outcome: Verbalized understanding;Demonstrated understanding      Therapy Time   Individual Concurrent Group Co-treatment   Time In 0846         Time Out 0911         Minutes 25         Timed Code Treatment Minutes: 948 Lexi Cerrato PTA

## 2023-01-10 NOTE — PLAN OF CARE
Problem: Discharge Planning  Goal: Discharge to home or other facility with appropriate resources  Outcome: Progressing     Problem: Pain  Goal: Verbalizes/displays adequate comfort level or baseline comfort level  Outcome: Progressing     Problem: Skin/Tissue Integrity  Goal: Absence of new skin breakdown  Description: 1. Monitor for areas of redness and/or skin breakdown  2. Assess vascular access sites hourly  3. Every 4-6 hours minimum:  Change oxygen saturation probe site  4. Every 4-6 hours:  If on nasal continuous positive airway pressure, respiratory therapy assess nares and determine need for appliance change or resting period. Outcome: Progressing     Problem: Nutrition Deficit:  Goal: Optimize nutritional status  Outcome: Progressing     Problem: Respiratory - Adult  Goal: Achieves optimal ventilation and oxygenation  1/9/2023 1903 by Tammy Isabel RN  Outcome: Progressing  1/9/2023 1203 by Genesis Winkler RCP  Outcome: Progressing     Problem: Confusion  Goal: Confusion, delirium, dementia, or psychosis is improved or at baseline  Description: INTERVENTIONS:  1. Assess for possible contributors to thought disturbance, including medications, impaired vision or hearing, underlying metabolic abnormalities, dehydration, psychiatric diagnoses, and notify attending LIP  2. [place] high risk fall precautions, as indicated  3. Provide frequent short contacts to provide reality reorientation, refocusing and direction  4. Decrease environmental stimuli, including noise as appropriate  5. Monitor and intervene to maintain adequate nutrition, hydration, elimination, sleep and activity  6. If unable to ensure safety without constant attention obtain sitter and review sitter guidelines with assigned personnel  7.  Initiate Psychosocial CNS and Spiritual Care consult, as indicated  Outcome: Progressing     Problem: Safety - Adult  Goal: Free from fall injury  Outcome: Progressing     Problem: ABCDS Injury Assessment  Goal: Absence of physical injury  Outcome: Progressing

## 2023-01-10 NOTE — CARE COORDINATION
Transitional planning-called Melvin Moscoso and left message with Marti King. [phone] call from Marti King at [facility]. [phone] informed patient that Melvin Moscoso is OON and that she did well with PT.  She said she will just go home tomorrow

## 2023-01-10 NOTE — PROGRESS NOTES
Congestive Heart Failure Education completed and charted. CHF booklet given. Patient was receptive to education. Discussed the  importance of medication compliance. Discussed the importance of a heart healthy diet. Discussed 2000 mg sodium-restricted daily diet. Patient instructed to limit fluid intake to  1.5 to 2 liters per day. Patient instructed to weigh self at the same time of each day each morning, reinforced teaching to monitor for 3-5 lb weight increase over 1-2 days notify physician if change noted. Signs and symptoms of CHF discussed with patient, such as feeling more tired than normal, feeling short of breath, coughing that increases when lying down, sudden weight gain, swelling of the feet, legs or belly. Patient verbalized understanding to notify physician office if these symptoms occur.   EF 65%   BNP 3,543

## 2023-01-10 NOTE — PROGRESS NOTES
Comprehensive Nutrition Assessment    Type and Reason for Visit:  Reassess    Nutrition Recommendations/Plan:   Continue regular diet.   Encourage PO intake as tolerated.   Add ONS if/as appropriate/needed (consider diabetic ONS / Glucerna)     Malnutrition Assessment:  Malnutrition Status:  Moderate malnutrition (01/10/23 1024)    Context:  Social/Environmental Circumstances     Findings of the 6 clinical characteristics of malnutrition:  Energy Intake:  Less than 75% estimated energy requirements for 3 months or longer (PTA / baseline per pt)  Weight Loss:  7.5% over 3 months     Body Fat Loss:  Mild body fat loss Triceps   Muscle Mass Loss:  Unable to assess    Fluid Accumulation:  No significant fluid accumulation     Strength:  Not Performed    Nutrition Assessment:    Pt extubated on 1/7. Pt reports she is currently eating well. Was having some loose stools, which she says was improved by Immodium. Last BM yesterday (pt reported). Pt states she typically only eats 1 meal per day at home d/t living alone, not wanting to cook often, and dislike of leftovers. Reports hx of pancreatitis and gastroparesis. Noted weight loss of 3.4 kg since admission (? change in bed scale contributing to weight difference).    Nutrition Related Findings:    meds/labs reviewed Wound Type: None       Current Nutrition Intake & Therapies:    Average Meal Intake: 51-75%, % (breakfast this morning)  Average Supplements Intake: None Ordered  ADULT DIET; Regular    Anthropometric Measures:  Height: 5' 6\" (167.6 cm)  Ideal Body Weight (IBW): 130 lbs (59 kg)    Admission Body Weight: 141 lb 8.6 oz (64.2 kg)  Current Body Weight: 134 lb 0.6 oz (60.8 kg), 103.1 % IBW. Weight Source: Bed Scale  Current BMI (kg/m2): 21.6  Usual Body Weight: 146 lb 9.7 oz (66.5 kg) (9/1/22 per EHR)  % Weight Change (Calculated): -8.6                    BMI Categories: Normal Weight (BMI 18.5-24.9)    Estimated Daily Nutrient Needs:  Energy Requirements  Based On: Kcal/kg  Weight Used for Energy Requirements: Current  Energy (kcal/day): 6983-2790 kcals/day  Weight Used for Protein Requirements: Current  Protein (g/day): 70-80 gm/day  Method Used for Fluid Requirements: ml/Kg (25-28 mL/kg)  Fluid (ml/day): 0558-5550 mL/day or per MD    Nutrition Diagnosis:   Inadequate oral intake related to  (recent vent) as evidenced by  (previous diet advancement)    Nutrition Interventions:   Food and/or Nutrient Delivery: Continue Current Diet  Nutrition Education/Counseling: Counseling completed (Encouraged small, frequent meals at home as tolerated. Reviewed easy to grab snack ideas for home.)  Coordination of Nutrition Care: Continue to monitor while inpatient  Plan of Care discussed with: Pt    Goals:  Previous Goal Met: Progressing toward Goal(s)  Goals: Meet at least 75% of estimated needs, prior to discharge       Nutrition Monitoring and Evaluation:   Behavioral-Environmental Outcomes: None Identified  Food/Nutrient Intake Outcomes: Food and Nutrient Intake  Physical Signs/Symptoms Outcomes: Meal Time Behavior, Weight, Biochemical Data, Nutrition Focused Physical Findings    Discharge Planning:     Too soon to determine     Ana Dixon MS, RD, LD  Contact: [phone]

## 2023-01-11 ENCOUNTER — HOSPITAL ENCOUNTER (OUTPATIENT)
Dept: PSYCHIATRY | Age: 53
Setting detail: THERAPIES SERIES
Discharge: HOME | End: 2023-01-11

## 2023-01-11 VITALS
WEIGHT: 134.04 LBS | DIASTOLIC BLOOD PRESSURE: 70 MMHG | OXYGEN SATURATION: 97 % | HEART RATE: 93 BPM | HEIGHT: 66 IN | BODY MASS INDEX: 21.54 KG/M2 | SYSTOLIC BLOOD PRESSURE: 116 MMHG | RESPIRATION RATE: 18 BRPM | TEMPERATURE: 98.2 F

## 2023-01-11 PROCEDURE — 6360000002 HC RX W HCPCS: Performed by: STUDENT IN AN ORGANIZED HEALTH CARE EDUCATION/TRAINING PROGRAM

## 2023-01-11 PROCEDURE — 99232 SBSQ HOSP IP/OBS MODERATE 35: CPT | Performed by: NURSE PRACTITIONER

## 2023-01-11 PROCEDURE — 6370000000 HC RX 637 (ALT 250 FOR IP): Performed by: STUDENT IN AN ORGANIZED HEALTH CARE EDUCATION/TRAINING PROGRAM

## 2023-01-11 PROCEDURE — 94640 AIRWAY INHALATION TREATMENT: CPT

## 2023-01-11 PROCEDURE — 99232 SBSQ HOSP IP/OBS MODERATE 35: CPT | Performed by: INTERNAL MEDICINE

## 2023-01-11 PROCEDURE — 94761 N-INVAS EAR/PLS OXIMETRY MLT: CPT

## 2023-01-11 PROCEDURE — 2580000003 HC RX 258: Performed by: STUDENT IN AN ORGANIZED HEALTH CARE EDUCATION/TRAINING PROGRAM

## 2023-01-11 PROCEDURE — 6370000000 HC RX 637 (ALT 250 FOR IP): Performed by: NURSE PRACTITIONER

## 2023-01-11 PROCEDURE — 6370000000 HC RX 637 (ALT 250 FOR IP): Performed by: INTERNAL MEDICINE

## 2023-01-11 PROCEDURE — 2700000000 HC OXYGEN THERAPY PER DAY

## 2023-01-11 PROCEDURE — 6360000002 HC RX W HCPCS

## 2023-01-11 PROCEDURE — 6360000002 HC RX W HCPCS: Performed by: NURSE PRACTITIONER

## 2023-01-11 RX ORDER — PREDNISONE 20 MG/1
20 TABLET ORAL DAILY
Qty: 3 TABLET | Refills: 0 | Status: SHIPPED | OUTPATIENT
Start: 2023-01-14 | End: 2023-01-17

## 2023-01-11 RX ORDER — NICOTINE 21 MG/24HR
1 PATCH, TRANSDERMAL 24 HOURS TRANSDERMAL DAILY
Qty: 30 PATCH | Refills: 3 | Status: SHIPPED | OUTPATIENT
Start: 2023-01-12

## 2023-01-11 RX ORDER — PREDNISONE 10 MG/1
10 TABLET ORAL DAILY
Qty: 3 TABLET | Refills: 0 | Status: SHIPPED | OUTPATIENT
Start: 2023-01-17 | End: 2023-01-20

## 2023-01-11 RX ORDER — ROPINIROLE 1 MG/1
1 TABLET, FILM COATED ORAL NIGHTLY
Qty: 90 TABLET | Refills: 3 | Status: SHIPPED | OUTPATIENT
Start: 2023-01-11

## 2023-01-11 RX ORDER — SODIUM CHLORIDE 1000 MG
1 TABLET, SOLUBLE MISCELLANEOUS
Qty: 90 TABLET | Refills: 3 | Status: SHIPPED | OUTPATIENT
Start: 2023-01-11

## 2023-01-11 RX ORDER — TOPIRAMATE 50 MG/1
25 TABLET, FILM COATED ORAL 2 TIMES DAILY
Qty: 60 TABLET | Refills: 3 | Status: SHIPPED | OUTPATIENT
Start: 2023-01-11

## 2023-01-11 RX ORDER — PREDNISONE 10 MG/1
30 TABLET ORAL DAILY
Qty: 6 TABLET | Refills: 0 | Status: SHIPPED | OUTPATIENT
Start: 2023-01-12 | End: 2023-01-14

## 2023-01-11 RX ADMIN — CARBAMAZEPINE 100 MG: 100 SUSPENSION ORAL at 10:16

## 2023-01-11 RX ADMIN — SODIUM CHLORIDE TAB 1 GM 1 G: 1 TAB at 11:42

## 2023-01-11 RX ADMIN — SODIUM CHLORIDE, PRESERVATIVE FREE 10 ML: 5 INJECTION INTRAVENOUS at 09:08

## 2023-01-11 RX ADMIN — Medication 100 MG: at 09:03

## 2023-01-11 RX ADMIN — AMLODIPINE BESYLATE 10 MG: 10 TABLET ORAL at 09:04

## 2023-01-11 RX ADMIN — ATORVASTATIN CALCIUM 20 MG: 20 TABLET, FILM COATED ORAL at 09:04

## 2023-01-11 RX ADMIN — PANCRELIPASE LIPASE, PANCRELIPASE PROTEASE, PANCRELIPASE AMYLASE 40000 UNITS: 20000; 63000; 84000 CAPSULE, DELAYED RELEASE ORAL at 09:03

## 2023-01-11 RX ADMIN — FENTANYL CITRATE 50 MCG: 50 INJECTION INTRAMUSCULAR; INTRAVENOUS at 09:26

## 2023-01-11 RX ADMIN — PIPERACILLIN AND TAZOBACTAM 3375 MG: 3; .375 INJECTION, POWDER, FOR SOLUTION INTRAVENOUS at 00:57

## 2023-01-11 RX ADMIN — IPRATROPIUM BROMIDE AND ALBUTEROL SULFATE 1 AMPULE: .5; 3 SOLUTION RESPIRATORY (INHALATION) at 11:08

## 2023-01-11 RX ADMIN — BUSPIRONE HYDROCHLORIDE 30 MG: 15 TABLET ORAL at 09:03

## 2023-01-11 RX ADMIN — SUCRALFATE 1 G: 1 TABLET ORAL at 09:09

## 2023-01-11 RX ADMIN — METOCLOPRAMIDE 5 MG: 5 TABLET ORAL at 09:05

## 2023-01-11 RX ADMIN — ENOXAPARIN SODIUM 40 MG: 100 INJECTION SUBCUTANEOUS at 09:04

## 2023-01-11 RX ADMIN — SODIUM CHLORIDE TAB 1 GM 1 G: 1 TAB at 09:03

## 2023-01-11 RX ADMIN — METOCLOPRAMIDE 5 MG: 5 TABLET ORAL at 11:42

## 2023-01-11 RX ADMIN — PIPERACILLIN AND TAZOBACTAM 3375 MG: 3; .375 INJECTION, POWDER, FOR SOLUTION INTRAVENOUS at 09:13

## 2023-01-11 RX ADMIN — FOLIC ACID 1 MG: 1 TABLET ORAL at 09:10

## 2023-01-11 RX ADMIN — PREDNISONE 30 MG: 20 TABLET ORAL at 09:03

## 2023-01-11 RX ADMIN — PANCRELIPASE LIPASE, PANCRELIPASE PROTEASE, PANCRELIPASE AMYLASE 40000 UNITS: 20000; 63000; 84000 CAPSULE, DELAYED RELEASE ORAL at 11:43

## 2023-01-11 RX ADMIN — SUCRALFATE 1 G: 1 TABLET ORAL at 11:43

## 2023-01-11 RX ADMIN — LORAZEPAM 1 MG: 1 TABLET ORAL at 11:42

## 2023-01-11 RX ADMIN — TOPIRAMATE 25 MG: 25 TABLET, FILM COATED ORAL at 09:03

## 2023-01-11 RX ADMIN — IPRATROPIUM BROMIDE AND ALBUTEROL SULFATE 1 AMPULE: .5; 3 SOLUTION RESPIRATORY (INHALATION) at 07:16

## 2023-01-11 ASSESSMENT — ENCOUNTER SYMPTOMS
ALLERGIC/IMMUNOLOGIC NEGATIVE: 1
COUGH: 1
SHORTNESS OF BREATH: 1
TROUBLE SWALLOWING: 0
DIARRHEA: 0
ABDOMINAL PAIN: 0
PHOTOPHOBIA: 0
EYE REDNESS: 0
VOICE CHANGE: 0
VOMITING: 0
WHEEZING: 0
SORE THROAT: 0

## 2023-01-11 ASSESSMENT — PAIN DESCRIPTION - DESCRIPTORS
DESCRIPTORS: ACHING
DESCRIPTORS: ACHING

## 2023-01-11 ASSESSMENT — PAIN DESCRIPTION - LOCATION
LOCATION: LEG;BACK
LOCATION: LEG;BACK

## 2023-01-11 ASSESSMENT — PAIN SCALES - GENERAL
PAINLEVEL_OUTOF10: 7
PAINLEVEL_OUTOF10: 6

## 2023-01-11 ASSESSMENT — PAIN DESCRIPTION - ORIENTATION
ORIENTATION: LEFT;RIGHT;LOWER
ORIENTATION: RIGHT;LEFT;LOWER

## 2023-01-11 ASSESSMENT — PAIN DESCRIPTION - ONSET: ONSET: ON-GOING

## 2023-01-11 ASSESSMENT — PAIN DESCRIPTION - FREQUENCY: FREQUENCY: CONTINUOUS

## 2023-01-11 ASSESSMENT — PAIN DESCRIPTION - PAIN TYPE: TYPE: ACUTE PAIN

## 2023-01-11 NOTE — PLAN OF CARE
Problem: Respiratory - Adult  Goal: Achieves optimal ventilation and oxygenation  1/10/2023 1938 by Cayla Gracia RCP  Outcome: Progressing   BRONCHOSPASM/BRONCHOCONSTRICTION     [x]         IMPROVE AERATION/BREATH SOUNDS  [x]   ADMINISTER BRONCHODILATOR THERAPY AS APPROPRIATE  [x]   ASSESS BREATH SOUNDS  []   IMPLEMENT AEROSOL/MDI PROTOCOL  [x]   PATIENT EDUCATION AS NEEDED

## 2023-01-11 NOTE — PROGRESS NOTES
[address] Note  MARVIN Zamora   1/11/2023  2:00 PM  Marcy Warner  1970  [id number]    Pt is currently inpatient. Therapy appt cancelled for today.   Next appt is scheduled for Thursday 1/12/2023 at 11 AM.

## 2023-01-11 NOTE — CARE COORDINATION
Discharge [facility] Case Management Department  Written by: Amisha Granados RN    Patient Name: Isidro Montalvo  Attending Provider: No att. providers found  Admit Date: 2023 12:55 PM  MRN: [id number]  Account: [de-identified]                     : 1970  Discharge Date: 2023      Disposition: home    Amisha Granados RN

## 2023-01-11 NOTE — PROGRESS NOTES
[facility]  Office: [address], DO, Isidro Presley, DO, Mary Beltrán, DO, Ariane Lyons Sal, DO, Keaton Bennett MD, Adriel Carbajal MD, Anai Henry MD, Luvenia Duverney, MD,  Spencer Evans MD, Fransico Beltrán MD, Agueda Cheung, DO, Sharron Acevedo MD,  Devonte Child, DO, Anitra Richards MD, Lizbeth Curry MD, Danni Ayala, DO, Nory Felipe MD, Salazar Morgan MD, Ivania Talley, DO, Seymour Tse MD, Hossein Sahu MD, Joann Baker MD, Jarad Lockhart MD, Carissa Colon, DO, Rach Felton MD, Shaheen Ibanez MD, Amrik Abarca, Brooklyn Ontiveros, CNP, Ciro Vivas, CNP, Nighat Saucedo, CNP,  Alonzo Moscoso, [facility], Jimmie Goel, CNP, Mala Villatoro, CNP, Neeru Hernandes, CNP, Lalitha Beyer, CNP, Liane Jurado, CNP, Richard Vera PA-C, Ronny Simpson, [facility], Jason Case, CNP, Anahi Del Angel, [address] [place] [address]    Progress Note    1/11/2023    1:15 PM    Name:   Josse Guzmán  MRN:     [id number]     Acct:      [de-identified]   Room:   [address] [place] [address] Day:  8  Admit Date:  1/1/2023 12:55 PM    PCP:   Neelam Ledezma MD  Code Status:  Full Code    Subjective:     C/C:   Chief Complaint   Patient presents with    Fatigue    Fall    Respiratory Distress     Interval History Status: improved. Seen and examined at bedside. She awakens easily. No overnight events. Agreeable for discharge today. Brief History:     Malik Mayorga is a pleasant, cooperative 47year old female with past medical history significant for chronic pancreatitis, polysubstance abuse, ethanol, major depressive disorder with multiple psych medication came into the ED with altered mental status and desatted to 42%. Lab in the ED was remarkable for leukocytosis 17.5 and UA concerning for UTI. Trauma work-up was negative CT pulmonary was also negative but did demonstrate diffuse groundglass opacities with mosaic type distribution. Patient was transferred to the ICU for further respiratory support. And was put on alternating BiPAP with high flow nasal cannula. Cardio was consulted in the ICU for uptrending troponin which eventually plateaued and echo was done which was unremarkable for any ischemic changes. But on 1/4/2023, patient became altered and was in the setting AVM in the BiPAP with 100% FiO2. A decision was made to intubate the patient and put him on ventilator. Patient was put on IV Zosyn for suspected pneumonia. Currently on Zosyn. Subsequently patient respiratory status progressively improved. On 1/6/2023, patient CPAP well and was ready for extubation. On 1/7/2023, patient was extubated and was ready to be transferred. Currently she is on 3 L of nasal cannula oxygen which is her home dose. Review of Systems:     Constitutional:  negative for chills, fevers, sweats  Respiratory:  negative for cough, dyspnea on exertion, shortness of breath, wheezing. Chronic supplemental oxygen  Cardiovascular:  negative for chest pain, chest pressure/discomfort, lower extremity edema, palpitations  Gastrointestinal:  negative for abdominal pain, constipation, diarrhea, nausea, vomiting  Neurological:  negative for dizziness, headache    Medications:      Allergies:  No Known Allergies    Current Meds:   Scheduled Meds:    amLODIPine  10 mg Oral Daily    folic acid  1 mg Oral Daily    metoclopramide  5 mg Oral TID AC    nicotine  1 patch TransDERmal Daily    sodium chloride  1 g Oral TID WC    predniSONE  30 mg Oral Daily    Followed by    Alicia Swan ON 1/14/2023] predniSONE  20 mg Oral Daily    Followed by    Alicia Swan ON 1/17/2023] predniSONE  10 mg Oral Daily    prazosin  1 mg Oral Nightly    pantoprazole  40 mg Oral Nightly    carBAMazepine  100 mg Oral BID    topiramate  25 mg Oral BID    ipratropium-albuterol  1 ampule Inhalation Q4H WA    piperacillin-tazobactam  3,375 mg IntraVENous Q8H    atorvastatin  20 mg Oral Daily    busPIRone 30 mg Oral BID    Pancrelipase (Lip-Prot-Amyl)  40,000 Units Oral TID WC    mirtazapine  15 mg Oral Nightly    pregabalin  200 mg Oral Nightly    rOPINIRole  1 mg Oral Nightly    sucralfate  1 g Oral 4x Daily    sodium chloride flush  5-40 mL IntraVENous 2 times per day    enoxaparin  40 mg SubCUTAneous Daily    sodium chloride flush  5-40 mL IntraVENous 2 times per day    thiamine  100 mg Oral Daily    magnesium sulfate  2,000 mg IntraVENous Once     Continuous Infusions:    sodium chloride 30 mL (01/10/23 0834)    sodium chloride 30 mL/hr (01/10/23 1730)     PRN Meds: metoprolol, LORazepam, baclofen, nicotine polacrilex, loperamide, lidocaine viscous hcl, fentanNYL, hydrOXYzine HCl, sodium chloride flush, sodium chloride, polyethylene glycol, acetaminophen **OR** acetaminophen, potassium chloride **OR** potassium alternative oral replacement **OR** potassium chloride, magnesium sulfate, sodium chloride flush, sodium chloride    Data:     Past Medical History:   has a past medical history of Acute respiratory failure with hypoxia ([address] Alcohol withdrawal syndrome, with delirium ([address] Alcoholism ([address] Anal warts, Anemia, Anxiety, 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24, the patient underwent 2 surgical resections without known recurrence, Bandemia, Closed fracture of lateral portion of left tibial plateau, Condyloma, COPD (chronic obstructive pulmonary disease) ([address] Depression, Dysphagia, GI bleed, Hypertension, Memory loss, Oxygen dependent, Pain, joint, ankle and foot, Pancreatic lesion, Perianal wart, Peripheral neuropathy, Seizures ([address] Tension headache, Under care of team, Under care of team, Under care of team, Under care of team, Under care of team, Under care of team, Wears glasses, Wears partial dentures, and Wellness examination. Social History:   reports that she has been smoking cigarettes. She has a 30.00 pack-year smoking history.  She has never used smokeless tobacco. She reports that she does not currently use alcohol. She reports current drug use. Frequency: 2.00 times per week. Family History:   Family History   Problem Relation Age of Onset    Other Father     Cancer Maternal Grandmother     Heart Disease Paternal Grandmother     Esophageal Cancer Maternal Aunt     Arthritis Mother        Vitals:  /70   Pulse 93   Temp 98.2 °F (36.8 °C) (Oral)   Resp 18   Ht 5' 6\" (1.676 m)   Wt 134 lb 0.6 oz (60.8 kg)   SpO2 97%   BMI 21.63 kg/m²   Temp (24hrs), Av.2 °F (36.8 °C), Min:97.9 °F (36.6 °C), Max:98.6 °F (37 °C)    No results for input(s): POCGLU in the last 72 hours. I/O (24Hr): Intake/Output Summary (Last 24 hours) at 2023 1315  Last data filed at 2023 0908  Gross per 24 hour   Intake 1290.85 ml   Output --   Net 1290.85 ml       Labs:  Hematology:  Recent Labs     23  0328   WBC 12.9*   RBC 3.75*   HGB 10.7*   HCT 34.6*   MCV 92.3   MCH 28.5   MCHC 30.9   RDW 15.9*   *   MPV 10.6     Chemistry:  Recent Labs     23  0328      K 3.5*      CO2 21   GLUCOSE 147*   BUN 10   CREATININE 0.39*   ANIONGAP 10   LABGLOM >60   CALCIUM 8.4*   No results for input(s): PROT, LABALBU, LABA1C, K1VVHLP, B0QPQBU, FT4, TSH, AST, ALT, LDH, GGT, ALKPHOS, LABGGT, BILITOT, BILIDIR, AMMONIA, AMYLASE, LIPASE, LACTATE, CHOL, HDL, LDLCHOLESTEROL, CHOLHDLRATIO, TRIG, VLDL, VDA61WC, PHENYTOIN, PHENYF, URICACID, POCGLU in the last 72 hours.   ABG:  Lab Results   Component Value Date/Time    POCPH 7.401 2023 04:38 AM    POCPCO2 45.4 2023 04:38 AM    POCPO2 103.9 2023 04:38 AM    POCHCO3 28.2 2023 04:38 AM    NBEA 8 2023 05:19 PM    PBEA 3 2023 04:38 AM    TRA9JEN NOT REPORTED 2021 03:38 PM    JXYU9OQZ 98 2023 04:38 AM    FIO2 40.0 2023 04:38 AM     Lab Results   Component Value Date/Time    SPECIAL ac 10ml 2023 01:06 PM     Lab Results   Component Value Date/Time    CULTURE NORMAL RESPIRATORY SUSIE MODERATE GROWTH 01/04/2023 05:18 PM       Radiology:  XR CHEST PORTABLE    Result Date: 1/5/2023  Improved aeration of the lung bases. Physical Examination:        General appearance:  alert, cooperative and no distress  Mental Status:  oriented to person, place and time and normal affect  Lungs:  clear to auscultation bilaterally significantly diminished posteriorly. Normal effort. Chronic supplemental oxygen at 3 L nasal cannula  Heart:  regular rate and rhythm, no murmur  Abdomen:  soft, nontender, nondistended, normal bowel sounds, no masses, hepatomegaly, splenomegaly  Extremities:  no edema, redness, tenderness in the calves  Skin:  no gross lesions, rashes, induration    Assessment:        Hospital Problems             Last Modified POA    * (Principal) Acute on chronic respiratory failure with hypoxia ([facility] 1/8/2023 Yes    Altered mental status 1/8/2023 Yes    Nicotine addiction 1/8/2023 Yes    COPD exacerbation ([address] 8/0/8528 Yes    Metabolic encephalopathy 0/5/7515 Yes    Chronic pancreatitis ([facility] [address] 1/8/2023 Yes    Pneumonia of both lungs due to infectious organism 1/8/2023 Yes    Moderate episode of recurrent major depressive disorder ([address] 1/8/2023 Yes       Plan:        COPD exacerbation with PNA s/p intubation. Continue abx and prednisone taper. Supplemental oxygen. Nebulizers prn. Home medications and prn medications for mental health and anxiety. Minipress, Buspar, Atarax and Ativan prn. Chronic pancreatitis. On Zenpep. Smoking cessation education. Nicotine patch on.   PT/OT eval and treat.    Dispo: Discharge home today    LOI Archer - NP  1/11/2023  1:15 PM

## 2023-01-11 NOTE — PLAN OF CARE
Problem: Discharge Planning  Goal: Discharge to home or other facility with appropriate resources  1/10/2023 2333 by Chayo Weir RN  Outcome: Progressing     Problem: Pain  Goal: Verbalizes/displays adequate comfort level or baseline comfort level  1/10/2023 2333 by Chayo Weir RN  Outcome: Progressing     Problem: Skin/Tissue Integrity  Goal: Absence of new skin breakdown  Description: 1. Monitor for areas of redness and/or skin breakdown  2. Assess vascular access sites hourly  3. Every 4-6 hours minimum:  Change oxygen saturation probe site  4. Every 4-6 hours:  If on nasal continuous positive airway pressure, respiratory therapy assess nares and determine need for appliance change or resting period. 1/10/2023 2333 by Chayo Weir RN  Outcome: Progressing     Problem: Nutrition Deficit:  Goal: Optimize nutritional status  1/10/2023 2333 by Chayo Weir RN    Problem: Safety - Adult  Goal: Free from fall injury  1/10/2023 2333 by Chayo Weir RN  Outcome: Progressing     Problem: ABCDS Injury Assessment  Goal: Absence of physical injury  1/10/2023 2333 by Chayo Weir RN  Outcome: Progressing     Problem: Confusion  Goal: Confusion, delirium, dementia, or psychosis is improved or at baseline  Description: INTERVENTIONS:  1. Assess for possible contributors to thought disturbance, including medications, impaired vision or hearing, underlying metabolic abnormalities, dehydration, psychiatric diagnoses, and notify attending LIP  2. [place] high risk fall precautions, as indicated  3. Provide frequent short contacts to provide reality reorientation, refocusing and direction  4. Decrease environmental stimuli, including noise as appropriate  5. Monitor and intervene to maintain adequate nutrition, hydration, elimination, sleep and activity  6. If unable to ensure safety without constant attention obtain sitter and review sitter guidelines with assigned personnel  7.  Initiate Psychosocial CNS and Spiritual Care consult, as indicated  1/10/2023 2333 by John Patterson, RN  Outcome: Progressing

## 2023-01-11 NOTE — PLAN OF CARE
Problem: Discharge Planning  Goal: Discharge to home or other facility with appropriate resources  Outcome: Completed     Problem: Pain  Goal: Verbalizes/displays adequate comfort level or baseline comfort level  Outcome: Completed     Problem: Skin/Tissue Integrity  Goal: Absence of new skin breakdown  Description: 1. Monitor for areas of redness and/or skin breakdown  2. Assess vascular access sites hourly  3. Every 4-6 hours minimum:  Change oxygen saturation probe site  4. Every 4-6 hours:  If on nasal continuous positive airway pressure, respiratory therapy assess nares and determine need for appliance change or resting period. Outcome: Completed     Problem: Nutrition Deficit:  Goal: Optimize nutritional status  Outcome: Completed     Problem: Respiratory - Adult  Goal: Achieves optimal ventilation and oxygenation  1/11/2023 1639 by Elham Stout RN  Outcome: Completed  1/11/2023 1110 by Yanira James RCP  Outcome: Progressing     Problem: Confusion  Goal: Confusion, delirium, dementia, or psychosis is improved or at baseline  Description: INTERVENTIONS:  1. Assess for possible contributors to thought disturbance, including medications, impaired vision or hearing, underlying metabolic abnormalities, dehydration, psychiatric diagnoses, and notify attending LIP  2. [place] high risk fall precautions, as indicated  3. Provide frequent short contacts to provide reality reorientation, refocusing and direction  4. Decrease environmental stimuli, including noise as appropriate  5. Monitor and intervene to maintain adequate nutrition, hydration, elimination, sleep and activity  6. If unable to ensure safety without constant attention obtain sitter and review sitter guidelines with assigned personnel  7.  Initiate Psychosocial CNS and Spiritual Care consult, as indicated  Outcome: Completed     Problem: Safety - Adult  Goal: Free from fall injury  Outcome: Completed     Problem: ABCDS Injury Assessment  Goal: Absence of physical injury  Outcome: Completed

## 2023-01-11 NOTE — PROGRESS NOTES
Physician Progress Note      PATIENTCatalina Search  CSN #:                  [id number]  :                       1970  ADMIT DATE:       2023 12:55 PM  [address] [place] DATE:  RESPONDING  PROVIDER #:        Alinda Mcburney APRN - CNP          QUERY TEXT:    Pt admitted with sepsis and has acute CHF documented in cardiology progress   notes. If possible, please document in progress notes and discharge summary   further specificity regarding the type of acute CHF:    The medical record reflects the following:  Risk Factors: sepsis, pneumonia, demand ischemia  Clinical Indicators: per cardiology progress notes \"Pneumonia with acute CHF   exacerbation\", BNP 16,515, CXR showed multifocal pneumonitis versus   interstitial edema, last echo  showed EF 60-65%, current echo showing EF   65%  Treatment: IV Lasix, abx, O2 per NRB/HHF NC, cardiology consult, CXR, CT's,   EKG, troponins, repeat echo    Thank you, Philomena Schumacher, CDI  email - [email or web address]  cell- [phone]  office hours M--7A-3P  Options provided:  -- Acute on Chronic Diastolic CHF/HFpEF  -- Acute on Chronic Systolic CHF/HFrEF  -- Acute on Chronic Systolic and Diastolic CHF  -- Other - I will add my own diagnosis  -- Disagree - Not applicable / Not valid  -- Disagree - Clinically unable to determine / Unknown  -- Refer to Clinical Documentation Reviewer    PROVIDER RESPONSE TEXT:    This patient is in acute on chronic diastolic CHF/HFpEF.     Query created by: Alice Bravo on 1/3/2023 10:50 AM      Electronically signed by:  Alinda Mcburney APRN - CNP 2023 9:23 AM

## 2023-01-11 NOTE — PROGRESS NOTES
Physical Therapy        Physical Therapy Cancel Note      DATE: 2023    NAME: Richi Quinn  MRN: [id number]   : 1970      Patient not seen this date for Physical Therapy due to: Other: Pt ambulating the [address] independently.       Electronically signed by Dionicio Cortez PTA on 2023 at 2:36 PM

## 2023-01-11 NOTE — PROGRESS NOTES
PULMONARY & CRITICAL CARE MEDICINE PROGRESS  NOTE     Patient:  Nasreen Ellsworth  MRN: [id number]  Admit date: 1/1/2023  Primary Care Physician: John Trevizo MD  Consulting Physician: Donny Mcqueen DO  CODE Status: Full Code  LOS: 10    SUBJECTIVE     I personally interviewed/examined the patient, reviewed interval history and interpreted all available radiographic, laboratory data at the time of service. Chief Compliant/Reason for Initial Consult:   Acute hypoxic hypercapnic respiratory failure on chronic hypoxic respiratory failure/pneumonia    Brief Hospital Course: The patient is a 47 y.o. female she has history of COPD/chronic hypoxic respiratory failure on nocturnal O2/history of chronic pancreatitis/depression and generalized anxiety disorder (per chart)/history of alcohol use/hypertension. Brought to the emergency room with altered mental status severe hypoxia initially treated with nonrebreather was apparently agitated and combative in the emergency room slightly improved initial CT scan was negative for pulm embolism but shows bilateral areas of groundglass changes diffusely concern for aspiration. Patient was admitted to ICU initially was on BiPAP high flow but ultimately requiring intubation on 01/04/2023 requiring sedation/apparently was on CIWA protocol and treated with Zosyn for bilateral pneumonia chest x-ray shows right lower lobe atelectasis/consolidation/infiltrate and lower left lower lobe infiltrate. Patient ultimately was weaned down on sedation arousable and was extubated on 01/07/2023 and was transferred out of ICU on 01/07/2023. Interval History:  01/11/23  Patient seen and examined this morning. Patient remained afebrile, blood pressure 116/70 and heart rate 93 this morning. Saturating well on 3 L nasal cannula. Echo reviewed, EF more than 65% . No significant valvular regurgitation or stenosis.   Discharge planning in progress, patient probably would be going home today.    Review of Systems:  Review of Systems   Constitutional:  Positive for fatigue. Negative for appetite change and unexpected weight change.   HENT:  Negative for postnasal drip, sore throat, trouble swallowing and voice change.    Eyes:  Negative for photophobia, redness and visual disturbance.   Respiratory:  Positive for cough and shortness of breath. Negative for wheezing.    Cardiovascular:  Negative for chest pain and leg swelling.   Gastrointestinal:  Negative for abdominal pain, diarrhea and vomiting.   Endocrine: Negative for polydipsia, polyphagia and polyuria.   Genitourinary:  Negative for difficulty urinating, dysuria, frequency and hematuria.   Musculoskeletal: Negative.    Allergic/Immunologic: Negative.    Neurological:  Negative for dizziness, tremors, speech difficulty and headaches.   Hematological:  Negative for adenopathy. Does not bruise/bleed easily.   Psychiatric/Behavioral: Negative.       OBJECTIVE     VITAL SIGNS:   LAST-  /70   Pulse 93   Temp 98.2 °F (36.8 °C) (Oral)   Resp 18   Ht 5' 6\" (1.676 m)   Wt 134 lb 0.6 oz (60.8 kg)   SpO2 97%   BMI 21.63 kg/m²   8-24 HR RANGE-  TEMP Temp  Av.2 °F (36.8 °C)  Min: 97.9 °F (36.6 °C)  Max: 98.6 °F (37 °C)   BP Systolic (24hrs), Av , Min:103 , Max:137      Diastolic (24hrs), Av, Min:70, Max:79     PULSE Pulse  Av.4  Min: 79  Max: 106   RR Resp  Av  Min: 18  Max: 18   O2 SAT SpO2  Av.3 %  Min: 94 %  Max: 97 %   OXYGEN DELIVERY O2 Flow Rate (L/min)  Avg: 3 L/min  Min: 3 L/min  Max: 3 L/min     Systemic Examination:   Physical Exam  Head and neck atraumatic, normocephalic    Lymph nodes-no cervical, supraclavicular lymphadenopathy    Neck-no JVP elevation    Lungs - AP diameter of chest increased. Thoracic expansion and diaphragmatic excursion diminished. BS diminished and expiratory phase prolonged. No dullness to percussion or tenderness to  palpation. No bronchial breath sounds . CVS- S1, S2 regular. No S3 no S4, no murmurs    Abdomen-nontender, nondistended. Bowel sounds are present. No organomegaly    Lower extremity-no edema    Upper extremity-no edema    Neurological-grossly normal cranial nerves. No overt motor deficit      DATA REVIEW     Medications:  Scheduled Meds:   amLODIPine  10 mg Oral Daily    folic acid  1 mg Oral Daily    metoclopramide  5 mg Oral TID AC    nicotine  1 patch TransDERmal Daily    sodium chloride  1 g Oral TID WC    predniSONE  30 mg Oral Daily    Followed by    Sade Abler ON 1/14/2023] predniSONE  20 mg Oral Daily    Followed by    Sade Abler ON 1/17/2023] predniSONE  10 mg Oral Daily    prazosin  1 mg Oral Nightly    pantoprazole  40 mg Oral Nightly    carBAMazepine  100 mg Oral BID    topiramate  25 mg Oral BID    ipratropium-albuterol  1 ampule Inhalation Q4H WA    piperacillin-tazobactam  3,375 mg IntraVENous Q8H    atorvastatin  20 mg Oral Daily    busPIRone  30 mg Oral BID    Pancrelipase (Lip-Prot-Amyl)  40,000 Units Oral TID     mirtazapine  15 mg Oral Nightly    pregabalin  200 mg Oral Nightly    rOPINIRole  1 mg Oral Nightly    sucralfate  1 g Oral 4x Daily    sodium chloride flush  5-40 mL IntraVENous 2 times per day    enoxaparin  40 mg SubCUTAneous Daily    sodium chloride flush  5-40 mL IntraVENous 2 times per day    thiamine  100 mg Oral Daily    magnesium sulfate  2,000 mg IntraVENous Once     Continuous Infusions:   sodium chloride 30 mL (01/10/23 0834)    sodium chloride 30 mL/hr (01/10/23 1730)     LABS:-  ABG:   No results for input(s): POCPH, POCPCO2, POCPO2, POCHCO3, JAAR2URH in the last 72 hours.     CBC:   Recent Labs     01/09/23  0328   WBC 12.9*   HGB 10.7*   HCT 34.6*   MCV 92.3   *   LYMPHOPCT 22*   RBC 3.75*   MCH 28.5   MCHC 30.9   RDW 15.9*       BMP:   Recent Labs     01/09/23  0328      K 3.5*      CO2 21   BUN 10   CREATININE 0.39*   GLUCOSE 147*       Liver Function Test:   No results for input(s): PROT, LABALBU, ALT, AST, GGT, ALKPHOS, BILITOT in the last 72 hours. Amylase/Lipase:  No results for input(s): AMYLASE, LIPASE in the last 72 hours. Coagulation Profile:   No results for input(s): INR, PROTIME, APTT in the last 72 hours. Cardiac Enzymes:  No results for input(s): CKTOTAL, CKMB, CKMBINDEX, TROPONINI in the last 72 hours. Lactic Acid:  No results found for: LACTA  BNP:   No results found for: BNP  D-Dimer:  Lab Results   Component Value Date    DDIMER 2.17 12/15/2018     Others:   Lab Results   Component Value Date    TSH 1.40 01/02/2023     Lab Results   Component Value Date    BRADLEY NEGATIVE 04/13/2021    SEDRATE 37 (H) 02/03/2021    .7 (H) 07/27/2021     No results found for: Ursula Duff  Lab Results   Component Value Date    IRON 35 (L) 01/08/2023    TIBC 242 (L) 01/08/2023    FERRITIN 182 (H) 01/08/2023     No results found for: SPEP, UPEP  Lab Results   Component Value Date/Time     21 07/21/2021 04:00 PM       Input/Output:    Intake/Output Summary (Last 24 hours) at 1/11/2023 1429  Last data filed at 1/11/2023 0908  Gross per 24 hour   Intake 1290.85 ml   Output --   Net 1290.85 ml         Microbiology:  No results for input(s): SPECDESC, SPECDESC, SPECIAL, CULTURE, CULTURE, STATUS, ORG, CDIFFTOXPCR, CAMPYLOBPCR, SALMONELLAPC, SHIGAPCR, SHIGELLAPCR, MPNEUG, MPNEUM, LACTOQL in the last 72 hours. Pathology:    Radiology reports:  XR CHEST PORTABLE   Final Result   Improved aeration of the lung bases. XR CHEST PORTABLE   Final Result   Endotracheal tube tip is approximately 4 cm above the roma. OG tube tip is   in the stomach. XR CHEST (SINGLE VIEW FRONTAL)   Final Result   Small right pleural effusion with bibasilar airspace opacities.   This could   represent atelectasis versus pneumonia         US GALLBLADDER RUQ   Final Result   Overall findings suggestive of diffuse hepatic parenchymal disease with   evidence to indicate underlying cirrhosis. Trace pericholecystic fluid is nonspecific and could be related to underlying   diffuse hepatic disease/cirrhosis. Otherwise, no other sonographic findings   to indicate the presence of acute cholecystitis. CT CHEST PULMONARY EMBOLISM W CONTRAST   Final Result   1. No evidence of pulmonary embolism. 2. Hazy vascular appearance with diffuse ground-glass background parenchymal   pattern with mosaic type distribution suggesting pulmonary venous congestion. Additionally, there is venous enhancement of the liver suggesting secondary   congestive change. Overall appearance suggest pulmonary edema. 3. Fatty liver. Although incompletely included in the field of view suspect   hepatomegaly. 4. Pancreatic calcifications suggesting chronic pancreatitis. 5. Enlarged right hilar lymph node. CT ABDOMEN PELVIS W IV CONTRAST Additional Contrast? None   Final Result   Findings suggesting stable subacute to chronic superior endplate compression   fracture of L1 with approximately 20% loss of vertebral body height. This is   seen on prior chest CT. Mild acute inferior endplate compression fracture of L2 without significant   loss of vertebral body height. This is new since prior 09/09/2021 CT but is   age indeterminate. Given history of recent trauma, these may be acute. Distended gallbladder with wall thickening and pericholecystic fluid. This   is nonspecific but can be seen with acute cholecystitis in the proper   clinical setting. Incidentally noted nonobstructing stone in the mid pole left kidney. Evidence of remote pancreatitis. Findings suggesting atypical pneumonia in the lung bases. Please see   separate chest CT. CT THORACIC SPINE TRAUMA RECONSTRUCTION   Final Result   Chronic T8, T9, T11 and T12 compression fractures. Chronic burst compression   fracture of T6. No significant change since prior chest CT.   No new fractures of the thoracic spine. CT LUMBAR SPINE TRAUMA RECONSTRUCTION   Final Result   Findings suggesting stable subacute to chronic superior endplate compression   fracture of L1 with approximately 20% loss of vertebral body height. This is   seen on prior chest CT. Mild acute inferior endplate compression fracture of L2 without significant   loss of vertebral body height. This is new since prior 09/09/2021 CT but is   age indeterminate. Given history of recent trauma, these may be acute. Distended gallbladder with wall thickening and pericholecystic fluid. This   is nonspecific but can be seen with acute cholecystitis in the proper   clinical setting. Incidentally noted nonobstructing stone in the mid pole left kidney. Evidence of remote pancreatitis. Findings suggesting atypical pneumonia in the lung bases. Please see   separate chest CT. CT HEAD WO CONTRAST   Final Result   No acute intracranial abnormality. CT CERVICAL SPINE WO CONTRAST   Final Result   No acute traumatic injury of the cervical spine. XR CHEST PORTABLE   Final Result   Diffuse infiltrates with differential diagnosis to include multifocal   pneumonitis versus interstitial edema. Echocardiogram:   No results found for this or any previous visit. Cardiac Catheterization:   No results found for this or any previous visit.       ASSESSMENT AND PLAN     Assessment:    //Acute hypoxic respiratory failure better   //Chronic hypoxic respiratory failure on home O2 usually use it at night  //COPD severity not known  //Tobacco dependence more than 30-pack-year history of smoking  //Bilateral pneumonia possible aspiration  //Right lower lobe atelectasis improve  //Bilateral small pleural effusion  //History of hypertension  //History of chronic pancreatitis  //History of seizure    Plan:    Continue duo neb   Restart Trelegy and albuterol on dc   S/p zosyn completed 10 days complete prednisone taper       Kiara Flaherty MD,   Pulmonary and Critical Care Medicine           1/11/2023, 2:29 PM    Please note that this chart was generated using voice recognition Dragon dictation software. Although every effort was made to ensure the accuracy of this automated transcription, some errors in transcription may have occurred. Attending Physician Statement  I have discussed the care of Sidra Apley, including pertinent history and exam findings,  with the resident. I have seen and examined the patient and the key elements of all parts of the encounter have been performed by me. I agree with the assessment, plan and orders as documented by the resident with additions . Electronically signed by Chidi Coe MD on   1/11/23 at 4:33 PM EST    Please note that this chart was generated using voice recognition Dragon dictation software. Although every effort was made to ensure the accuracy of this automated transcription, some errors in transcription may have occurred.

## 2023-01-12 ENCOUNTER — HOSPITAL ENCOUNTER (OUTPATIENT)
Dept: PSYCHIATRY | Age: 53
Setting detail: THERAPIES SERIES
Discharge: HOME | End: 2023-01-12
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
## 2023-01-13 ENCOUNTER — TELEPHONE (OUTPATIENT)
Dept: FAMILY MEDICINE CLINIC | Age: 53
End: 2023-01-13

## 2023-01-13 DIAGNOSIS — R13.19 ESOPHAGEAL DYSPHAGIA: ICD-10-CM

## 2023-01-13 RX ORDER — CARBAMAZEPINE 200 MG/1
200 TABLET ORAL 3 TIMES DAILY
Qty: 90 TABLET | Refills: 5 | Status: SHIPPED | OUTPATIENT
Start: 2023-01-13

## 2023-01-13 NOTE — TELEPHONE ENCOUNTER
Last visit: 09/01/2022  Last Med refill:   Does patient have enough medication for 72 hours: No:     Next Visit Date:  Future Appointments   Date Time Provider Lexi Anna   1/17/2023  9:00 AM Dwain Gregory, LISW [facility]   1/19/2023 11:30 AM Ludivina Avila MD [facility]   1/24/2023 10:00 AM LOI Casillas CNP Resp Spec [facility]   1/27/2023 11:00 AM Dwain Gregory, LISW [facility]   2/3/2023 11:00 AM Dwain Gregory, LISW [facility]   2/10/2023 11:00 AM Dwain Gregory, LISW [facility]   2/17/2023 11:00 AM Dwain Gregory, LISW [facility]   2/24/2023 11:00 AM Dwain Gregory, LISW [facility]   2/28/2023 11:40 AM LOI Pantoja CNP Neuro Spec Neurology -   3/3/2023 11:00 AM Dwain Gregory, LISW [facility]   3/10/2023 11:00 AM Dwain Gregory, LISW [facility]   3/17/2023 11:00 AM Dwain Gregory, LISW [facility]   3/24/2023 11:00 AM Dwain Gregory, L[facility]W [facility]   3/31/2023 11:00 AM Dwain Gregory, L[facility]W [facility]   4/7/2023 11:00 AM Dwain Gregory, L[facility]W [facility]   4/14/2023 11:00 AM Dwain Gregory, LISW [facility]   5/2/2023  1:45 PM William Savage MD Resp Spec [address] Maintenance   Topic Date Due    COVID-19 Vaccine (5 - Booster for Pfizer series) 08/04/2022    Pneumococcal 0-64 years Vaccine (2 - PCV) 11/04/2022    Depression Monitoring  04/04/2023    Lipids  09/01/2023    Low dose CT lung screening  11/09/2023    Breast cancer screen  04/08/2024    DTaP/Tdap/Td vaccine (2 - Td or Tdap) 12/28/2030    Colorectal Cancer Screen  11/19/2031    Flu vaccine  Completed    Shingles vaccine  Completed    Hepatitis C screen  Completed    HIV screen  Completed    Hepatitis A vaccine  Aged Out    Hib vaccine  Aged Out    Meningococcal (ACWY) vaccine  Aged Out       Hemoglobin A1C (%)   Date Value   04/13/2021 5.5   07/14/2019 4.3             ( goal A1C is < 7)   No results found for: LABMICR  LDL Cholesterol (mg/dL)   Date Value   09/01/2022 176 (H)   12/23/2020 134 (H)       (goal LDL is <100)   AST (U/L)   Date Value   01/05/2023 33 (H)     ALT (U/L)   Date Value   01/05/2023 30     BUN (mg/dL)   Date Value   01/09/2023 10     BP Readings from Last 3 Encounters:   01/11/23 116/70   11/01/22 118/84   09/01/22 110/78          (goal 120/80)    All Future Testing planned in [facility]  Lab Frequency Next Occurrence   EGD Once 02/04/2022   Urinalysis with Reflex to Culture Once 09/20/2022   COLONOSCOPY (Diagnostic) Once 11/07/2022               Patient Active Problem List:     Acute bronchitis     Reflex sympathetic dystrophy of lower limb     Essential hypertension     Nicotine addiction     Psychophysiological insomnia     Anxiety     Alcoholic peripheral neuropathy (HCC)     Hypokalemia     Macrocytic anemia     Hypomagnesemia     Tobacco use     Ambulatory dysfunction     Seizure disorder (HCC)     COPD exacerbation (HCC)     Paresthesia of lower extremity     Acute respiratory failure with hypoxia (HCC)     Pancreatic cyst     Recurrent major depressive disorder (HCC)     Elevated CA 19-9 level     Perineal mass, female     Esophagitis candidal      Condyloma     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24, the patient underwent 2 surgical resections without known recurrence     Alcohol use disorder, severe, in early remission ([address]     Metabolic encephalopathy     AMAN (generalized anxiety disorder)     Major depressive disorder, recurrent severe without psychotic features ([address]     Esophageal dysphagia     Chronic peripheral neuropathic pain     History of alcohol abuse     History of petit-mal seizures     Intractable episodic tension-type headache     Personal history of astrocytoma     Multilevel spine pain     Chronic pain syndrome     Marijuana abuse     Severe sepsis (HCC)     Closed fracture of fifth thoracic vertebra ([facility]     Diverticulitis of large intestine with abscess without bleeding     Elevated alkaline phosphatase level     Chronic pancreatitis (HCC)     Erythema ab igne     Compression fracture of thoracic vertebra (HCC)     Pathological compression fracture of lumbar vertebra with delayed healing     Metabolic acidosis with increased anion gap and accumulation of organic acids     Pneumonia of both lungs due to infectious organism     Septicemia ([address]     Alcohol-induced acute pancreatitis     Fluid collection of pancreas     Diverticulitis of large intestine without perforation or abscess with bleeding     Pseudocyst of pancreas     Sepsis ([address]     Acute recurrent pancreatitis     Atelectasis of left lung     Hyponatremia     Iron deficiency anemia     Adenomatous polyp of sigmoid colon     Moderate episode of recurrent major depressive disorder (HCC)     Sleep disturbance     Intractable migraine without aura and without status migrainosus     Acute on chronic respiratory failure with hypoxia (HCC)     Altered mental status

## 2023-01-13 NOTE — TELEPHONE ENCOUNTER
Care Transitions Initial Follow Up Call    Outreach made within 2 business days of discharge: Yes    Patient: Ga Ernst Patient : 1970   MRN: [id number]  Reason for Admission: There are no discharge diagnoses documented for the most recent discharge. Discharge Date: 23       Spoke with: Shaunna Sharp    Discharge department/facility: [facility]    TCM Interactive Patient Contact:  Was patient able to fill all prescriptions: Yes  Was patient instructed to bring all medications to the follow-up visit: Yes  Is patient taking all medications as directed in the discharge summary?  Yes  Does patient understand their discharge instructions: Yes  Does patient have questions or concerns that need addressed prior to 7-14 day follow up office visit: no    Scheduled appointment with PCP within 7-14 days    Follow Up  Future Appointments   Date Time Provider Lexi Castillo   2023  9:00 AM Lauro Kartik, Juan Lavelle   2023 11:30 AM Ludivina Jurado MD [facility]   2023 11:00 AM Lauro Jjos, LISW [facility]   2/3/2023 11:00 AM Lauro Kartik, LISW [facility]   2/10/2023 11:00 AM Lauro Kartik, L[facility]W [facility]   2023 11:00 AM Lauro Kartik, L[facility]W [facility]   2023 11:00 AM Lauro Kartik, L[facility]W [facility]   2023 11:40 AM LOI Yadav - CNP Neuro Spec Neurology -   3/3/2023 11:00 AM Lauro Kartik, LISW [facility]   3/10/2023 11:00 AM Lauro Kartik, L[facility]W [facility]   3/17/2023 11:00 AM Lauro Kartik, L[facility]W [facility]   3/24/2023 11:00 AM Lauro Kartik, L[facility]W [facility]   3/31/2023 11:00 AM Laurovania Jjos, LISW [facility]   2023 11:00 AM [facility]   2023 11:00 AM [facility]   2023  1:45 PM Joel Mackay MD Resp Spec Mike Urbina Renny Saldaña MA

## 2023-01-13 NOTE — TELEPHONE ENCOUNTER
Pharmacy requesting refill of atorvastatin (LIPITOR) 20 MG tablet       Medication active on med list yes      Date of last Rx: 09/06/2022 with 3 refills          verified by JEMIMA LLAMAS      Date of last appointment 05/24/2022    Next Visit Date:  2/28/2023

## 2023-01-14 NOTE — DISCHARGE SUMMARY
[facility]  Office: [address], DO, Fany Ward DO, Ree Salcido, DO, Arnold Huang, DO, Franchesca Pineda MD, Rosina Dewey MD, Amparo Dumont MD, Quin Willis MD,  Marissa Lock MD, Henrietta Montelongo MD, Ludy Green DO, Aleksey Hicks MD,  Sita Singh MD, Giles Morgan MD, Shira Castellanos, DO, Alo Oswald MD, Korey Aburto MD, Joanne Cardenas DO, Aide Bobby MD, Javier Ham MD, Alma Martinez MD, Peña Zurita MD, Ovi Farias DO, Courtney Hernandez MD, Ana Shipley MD, Amado Benjamin CNP,  Moises Peterson, CNP, Christel Bunch, CNP, Estela Apple, CNP,  Perez Scherer, [facility], [place]), Betty May, CNP, Jennifer Sears, CNP, Yaw Gutiérrez, CNP, Adrianna Chung, CNP, Tristan Rice, CNP, Becki Lucero PAZenobiaC, Enrique Romo, CNS, Jayme Huang, CNP, Shawn Timmons, [facility]    Discharge Summary     Patient ID: Isidro Montalvo  :  1970   MRN: [id number]     ACCOUNT:  [de-identified]   Patient's PCP: Charlie Sorto MD  Admit Date: 2023   Discharge Date: 2023     Length of Stay: 10  Code Status:  Prior  Admitting Physician: Fe Ochoa MD  Discharge Physician: LOI Cole - JW     Active Discharge Diagnoses:     Hospital Problem Lists:  Principal Problem:    Acute on chronic respiratory failure with hypoxia ([facility] [address]  Active Problems:    Altered mental status    Nicotine addiction    COPD exacerbation ([facility] [address]    Metabolic encephalopathy    Chronic pancreatitis ([facility] [address]    Pneumonia of both lungs due to infectious organism    Moderate episode of recurrent major depressive disorder ([facility] [address] [facility]  Resolved Problems:    * No resolved hospital problems.  *      Admission Condition:  serious     Discharged Condition: good    Hospital Stay:     Hospital Course:  Isidro Montalvo is a 47 y.o. female who was admitted for the management of   Acute on chronic respiratory failure with hypoxia ([address] , presented to ER with Fatigue, Fall, and Respiratory Distress      Significant therapeutic interventions:     COPD exacerbation with PNA s/p intubation. Continue abx and prednisone taper. Supplemental oxygen. Nebulizers prn. Home medications and prn medications for mental health and anxiety. Minipress, Buspar, Atarax and Ativan prn. Chronic pancreatitis. On Zenpep. Smoking cessation education. Nicotine patch on. Significant Diagnostic Studies:   Labs / Micro:  CBC:   Lab Results   Component Value Date/Time    WBC 12.9 01/09/2023 03:28 AM    RBC 3.75 01/09/2023 03:28 AM    RBC 4.16 04/30/2012 07:48 PM    HGB 10.7 01/09/2023 03:28 AM    HCT 34.6 01/09/2023 03:28 AM    MCV 92.3 01/09/2023 03:28 AM    MCH 28.5 01/09/2023 03:28 AM    MCHC 30.9 01/09/2023 03:28 AM    RDW 15.9 01/09/2023 03:28 AM     01/09/2023 03:28 AM     04/30/2012 07:48 PM     BMP:    Lab Results   Component Value Date/Time    GLUCOSE 147 01/09/2023 03:28 AM    GLUCOSE 92 04/30/2012 07:48 PM     01/09/2023 03:28 AM    K 3.5 01/09/2023 03:28 AM     01/09/2023 03:28 AM    CO2 21 01/09/2023 03:28 AM    ANIONGAP 10 01/09/2023 03:28 AM    BUN 10 01/09/2023 03:28 AM    CREATININE 0.39 01/09/2023 03:28 AM    BUNCRER NOT REPORTED 01/10/2022 12:13 PM    CALCIUM 8.4 01/09/2023 03:28 AM    LABGLOM >60 01/09/2023 03:28 AM    GFRAA >60 09/01/2022 11:49 AM    GFR      09/01/2022 11:49 AM        Radiology:  No results found. Consultations:    Consults:     Final Specialist Recommendations/Findings:   IP CONSULT TO HOSPITALIST  IP CONSULT TO HEART FAILURE NURSE/COORDINATOR  IP CONSULT TO DIETITIAN  IP CONSULT TO CARDIOLOGY  IP CONSULT TO SOCIAL WORK  IP CONSULT TO IV TEAM  IP CONSULT TO DIETITIAN      The patient was seen and examined on day of discharge and this discharge summary is in conjunction with any daily progress note from day of discharge.     Discharge plan:     Disposition: Home    Physician Follow Up: PCP  No follow-up provider specified. Requiring Further Evaluation/Follow Up POST HOSPITALIZATION/Incidental Findings: Follow up with PCP    Diet: regular diet    Activity: As tolerated    Instructions to Patient: Return to hospital for any fever  or return of respiratory distress. Smoking cessation strongly encouraged. Discharge Medications:      Medication List        START taking these medications      nicotine 21 MG/24HR  Commonly known as: [address] [facility] 1 patch onto the skin daily     * predniSONE 10 MG tablet  Commonly known as: DELTASONE  Take 3 tablets by mouth daily for 2 doses     * predniSONE 20 MG tablet  Commonly known as: DELTASONE  Take 1 tablet by mouth daily for 3 doses     * predniSONE 10 MG tablet  Commonly known as: DELTASONE  Take 1 tablet by mouth daily for 3 doses  Start taking on: January 17, 2023           * This list has 3 medication(s) that are the same as other medications prescribed for you. Read the directions carefully, and ask your doctor or other care provider to review them with you. CHANGE how you take these medications      rOPINIRole 1 MG tablet  Commonly known as: REQUIP  Take 1 tablet by mouth nightly  What changed: See the new instructions. sodium chloride 1 g tablet  Take 1 tablet by mouth 3 times daily (with meals)  What changed:   when to take this  additional instructions     topiramate 50 MG tablet  Commonly known as:  Topamax  Take 0.5 tablets by mouth 2 times daily  What changed: how much to take            CONTINUE taking these medications      albuterol sulfate  (90 Base) MCG/ACT inhaler  Commonly known as: Ventolin HFA  Inhale 2 puffs into the lungs 4 times daily as needed for Wheezing     amLODIPine 10 MG tablet  Commonly known as: NORVASC  TAKE ONE TABLET BY MOUTH DAILY     atorvastatin 20 MG tablet  Commonly known as: Lipitor  Take 1 tablet by mouth daily     baclofen 10 MG tablet  Commonly known as: LIORESAL  Take 1 tablet by mouth 3 times daily     busPIRone 30 MG tablet  Commonly known as: BUSPAR     Creon 42850-43264 units delayed release capsule  Generic drug: lipase-protease-amylase  TAKE ONE CAPSULE BY MOUTH THREE TIMES A DAY WITH A MEAL     fluticasone-umeclidin-vilant 100-62.5-25 MCG/INH Aepb  Commonly known as: TRELEGY ELLIPTA  Inhale 1 puff into the lungs daily     folic acid 1 MG tablet  Commonly known as: FOLVITE  Take 1 tablet by mouth daily     Handicap Placard Misc  by Does not apply route Expires 5/2027     hydrOXYzine HCl 50 MG tablet  Commonly known as: ATARAX     metoclopramide 5 MG tablet  Commonly known as: REGLAN  TAKE ONE TABLET BY MOUTH THREE TIMES A DAY     mirtazapine 15 MG tablet  Commonly known as: REMERON     omeprazole 40 MG delayed release capsule  Commonly known as: PRILOSEC  Take 1 capsule by mouth in the morning and at bedtime     prazosin 1 MG capsule  Commonly known as: MINIPRESS  Take 1 capsule by mouth nightly     pregabalin 200 MG capsule  Commonly known as: LYRICA  Take 1 capsule by mouth nightly for 30 days.      simethicone 80 MG chewable tablet  Commonly known as: MYLICON  Take 1 tablet by mouth 4 times daily as needed for Flatulence     solifenacin 10 MG tablet  Commonly known as: VESICARE     sucralfate 1 GM tablet  Commonly known as: CARAFATE  Take 1 tablet by mouth 4 times daily     vitamin D 1.25 MG (69195 UT) Caps capsule  Commonly known as: ERGOCALCIFEROL  Take 1 capsule by mouth once a week            STOP taking these medications      lidocaine 5 % ointment  Commonly known as: XYLOCAINE     nicotine polacrilex 4 MG lozenge  Commonly known as: Nicotine Mini     Rexulti 1 MG Tabs tablet  Generic drug: brexpiprazole     senna 8.6 MG tablet  Commonly known as: Senokot     varenicline 1 MG tablet  Commonly known as: CHANTIX     verapamil 120 MG extended release tablet  Commonly known as: CALAN SR               Where to Get Your Medications        These medications were sent to [facility] [id number] [address]  [address] [facility], [address] [place] [address],[address]      Phone: [phone]   nicotine 21 MG/24HR  predniSONE 10 MG tablet  predniSONE 10 MG tablet  predniSONE 20 MG tablet  rOPINIRole 1 MG tablet  sodium chloride 1 g tablet  topiramate 50 MG tablet         No discharge procedures on file. Time Spent on discharge is  31 mins in patient examination, evaluation, counseling as well as medication reconciliation, prescriptions for required medications, discharge plan and follow up. Electronically signed by   LOI Escobar NP  1/14/2023  2:01 PM      Thank you Dr. Caterina Francois MD for the opportunity to be involved in this patient's care.

## 2023-01-15 RX ORDER — ATORVASTATIN CALCIUM 20 MG/1
TABLET, FILM COATED ORAL
Qty: 30 TABLET | Refills: 3 | Status: SHIPPED | OUTPATIENT
Start: 2023-01-15

## 2023-01-17 ENCOUNTER — HOSPITAL ENCOUNTER (OUTPATIENT)
Dept: PSYCHIATRY | Age: 53
Setting detail: THERAPIES SERIES
Discharge: HOME | End: 2023-01-17
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
## 2023-01-18 RX ORDER — OMEPRAZOLE 40 MG/1
CAPSULE, DELAYED RELEASE ORAL
Qty: 60 CAPSULE | Refills: 2 | Status: SHIPPED | OUTPATIENT
Start: 2023-01-18

## 2023-02-07 ENCOUNTER — TELEMEDICINE (OUTPATIENT)
Dept: PULMONOLOGY | Age: 53
End: 2023-02-07
Payer: MEDICAID

## 2023-02-07 DIAGNOSIS — J96.10 CHRONIC RESPIRATORY FAILURE, UNSPECIFIED WHETHER WITH HYPOXIA OR HYPERCAPNIA (HCC): ICD-10-CM

## 2023-02-07 DIAGNOSIS — G25.81 RESTLESS LEG: ICD-10-CM

## 2023-02-07 DIAGNOSIS — J96.11 CHRONIC RESPIRATORY FAILURE WITH HYPOXIA AND HYPERCAPNIA (HCC): Primary | ICD-10-CM

## 2023-02-07 DIAGNOSIS — J44.0 CHRONIC OBSTRUCTIVE PULMONARY DISEASE WITH ACUTE LOWER RESPIRATORY INFECTION (HCC): ICD-10-CM

## 2023-02-07 DIAGNOSIS — J96.12 CHRONIC RESPIRATORY FAILURE WITH HYPOXIA AND HYPERCAPNIA (HCC): Primary | ICD-10-CM

## 2023-02-07 PROCEDURE — 99213 OFFICE O/P EST LOW 20 MIN: CPT | Performed by: NURSE PRACTITIONER

## 2023-02-07 ASSESSMENT — ENCOUNTER SYMPTOMS
EYES NEGATIVE: 1
ALLERGIC/IMMUNOLOGIC NEGATIVE: 1
GASTROINTESTINAL NEGATIVE: 1

## 2023-02-07 NOTE — PROGRESS NOTES
Subjective:      Patient ID: Juan Maloney is a 47 y.o. female. HPI  Patient here for for follow-up for post hospital evaluation. Patient with a history of COPD, chronic hypoxemic respiratory failure and smoking. Patient was last seen in the office in November 2022 last seen in office per per me and in March 2022 per Dr. Anita Magana. Patient was hospitalized 1/1/2023 through 1/11/2023. Patient presented with shortness of breath with acute COPD exacerbation and pneumonia, required intubation. She was treated with a course of antibiotics and prednisone taper. Patient was intubated 1/4/2023 and extubated on 1/7/2023. She was on a CIWA protocol and treated with a course of Zosyn during her hospitalization. Medications:   Requip 1 mg nightly  Trelegy Ellipta  Albuterol HFA: Using every 3 weeks  Oxygen 3 LPM     PRIOR WORKUP:  PFT:  6-minute walk test 11/1/2022: Patient was on room air and dropped down to 87% with ambulation. Required supplemental oxygen to maintain an SPO2 of greater than 90% with 3 L. PFT 11/1/2022: FVC 2.10 L, 65% of predicted. FEV1 1.52 L, 58% of predicted. FEV1/FVC ratio 72, 89% of predicted. FEF 25-75 is 1.08, 39% of predicted. RV is 2.20, 118% of predicted. TLC is 3.65, 71% of predicted. DSB is 5.26, 26% of predicted. Final impression: Mild discrete ventilatory restrictive dysfunction. Diffusion capacity is reduced out of proportion to ventilatory dysfunction     CT Imaging:  CT chest 1/1/2023: Negative for pulmonary embolism. Enlarged right hilar lymph node with other lymph nodes that are shotty in appearance. Groundglass background opacities distributed diffusely but heterogenously in a mosaic type pattern. Suggesting pulmonary venous congestion. Negative for pleural effusion or pneumothorax. Esophagus is patulous from the hilar area to the EG junction. CT chest 11/11/2022. Emphysematous changes with small blebs in the right lower lobe.   Right lower lobe noncalcified solid pulmonary nodule measuring 4.3 mm. Pleural-parenchymal banding at bases. Scarring present in the lingula. Multiple thoracic compression fractions worsened since prior exam.  Patient will need to follow-up with primary for further management. Repeat CT imaging in 1 year and smoking cessation if not already done      CT chest 7/21/2021: Negative for pulmonary embolism. GGO in the lingula. Cysts stable small loculated pneumatocele and new cystic bronchiectasis on the right. CT chest 5/24/2021: No mediastinal adenopathy. Ill-defined areas of GGO in the upper lobes. Bibasilar scarring. No focal consolidation, pneumothorax or pleural effusion. Septated cyst superior segment of the right lower lobe and similar finding in the superior segment of the left lower lobe. No suspicious pulmonary nodule. CT chest 12/15/2018: Negative for pulmonary embolism. No mediastinal adenopathy. 8 x 14 mm groundglass nodule in the anterior right lower lobe. No other pulmonary nodule evident. No focal consolidation or pulmonary edema. No pleural effusion or pneumothorax. Few patchy thin-walled air cyst bilateral lungs     Sleep Study:  PSG 5/29/2021: Patient had 3 obstructive apneas and 2 hypopneas for an AHI for the entire night of 1.0. AHI was 1.2 during NREM sleep compared to an AHI of 0.0 during REM sleep. Minimum SPO2 was 75%. Patient spent a total of 285 minutes with an SPO2 of less than 89%. Patient had 45 leg movements during the night for a PLM index of 9.4 with 0 associated arousals.      Laboratory Evaluation:          Immunizations:   Immunization History   Administered Date(s) Administered    COVID-19, PFIZER GRAY top, DO NOT Dilute, (age 15 y+), IM, 30 mcg/0.3 mL 06/09/2022    COVID-19, PFIZER PURPLE top, DILUTE for use, (age 15 y+), 30mcg/0.3mL 03/26/2021, 04/23/2021, 11/04/2021    Influenza Vaccine, unspecified formulation 10/18/2018    Influenza Virus Vaccine 11/01/2017, 10/18/2018, 09/10/2019, 10/01/2020    Influenza, AFLURIA (age 3 yrs+), FLUZONE, (age 6 mo+), MDV, 0.5mL 09/10/2019    Influenza, FLUCELVAX, (age 6 mo+), MDCK, PF, 0.5mL 10/04/2021, 11/01/2022    MMR 09/27/2006    Pneumococcal Polysaccharide ([id number]) 12/28/2020, 11/04/2021    Tdap (Boostrix, Adacel) 12/28/2020    Zoster Recombinant (Shingrix) 11/04/2021, 02/24/2022, 06/29/2022        Sleep Medicine 3/22/2022 5/20/2021 2/26/2021 11/20/2020   Sitting and reading 0 3 0 1   Watching TV 1 3 0 1   Sitting, inactive in a public place (e.g. a theatre or a meeting) 2 0 0 0   As a passenger in a car for an hour without a break 1 2 0 1   Lying down to rest in the afternoon when circumstances permit 1 1 0 2   Sitting and talking to someone 1 0 0 0   Sitting quietly after a lunch without alcohol 1 1 0 1   In a car, while stopped for a few minutes in traffic 2 0 0 0   [place] Sleepiness Score 9 10 0 6       There were no vitals taken for this visit.    Past Medical History:   Diagnosis Date    Acute respiratory failure with hypoxia ([facility]) 10/16/2020    Alcohol withdrawal syndrome, with delirium ([facility]) 12/14/2019    Alcoholism ([facility])     Anal warts     Anemia 10/2020    GI bleed    Anxiety     Astrocytoma ([facility]) - diagnosed at age 17, the patient underwent 2 surgical resections without known recurrence 10/23/2020    at age 17    Bandemia     Closed fracture of lateral portion of left tibial plateau 07/04/2013    Condyloma     COPD (chronic obstructive pulmonary disease) ([facility])     CO2 retainer, on Bipap at night for this, Dr. Dueñas ( last visit 11/20/2020 and note on chart )    Depression      major depressive disorder, ptsd, anxiety    Dysphagia     GI bleed 10/2020    Hypertension     Memory loss     Oxygen dependent     USES  3L/MIN DAILY PRN AND NIGHTLY CONTINUOUSLY    Pain, joint, ankle and foot     Pancreatic lesion 10/2020    Dr. Camacho working up pt    Perianal wart     Peripheral neuropathy     Seizures ([facility])     LAST  SEIZURE 3/2020 - FEELS IT WAS FROM ALCOHOL WITHDRAWL    Tension headache     Under care of team 09/29/2021    neuro-Dr Coyle-[facility]-last visit sep 2021    Under care of team 09/29/2021    pain management-Hamzah Martines-[facility]-last visit sep 2021    Under care of team     pulmonology-Dr Dueñas-[facility]-due for visit March 2022    Under care of team 09/29/2021    psych-bahnfeldt NP-telemed-last visit 10/ 2021    Under care of team 09/29/2021    [address]-last visit aug 2021    Under care of team     NEPHROLOGY - DR. LEE    Wears glasses     Wears partial dentures     UPPER    Wellness examination     [facility] Jan 5, 2022     Past Surgical History:   Procedure Laterality Date    ANUS SURGERY N/A 01/25/2022    EXCISION AND FULGURATION, ANAL, VAGINAL AND PERIANAL WARTS WITH CO2 LASER, FORTEC performed by Lavern Deleon MD at [address] times 2    COLONOSCOPY N/A 10/22/2020    COLONOSCOPY DIAGNOSTIC performed by Oscar Morales MD at [facility]    COLONOSCOPY N/A 11/19/2021    COLONOSCOPY W/ ENDOSCOPIC MUCOSAL RESECTION performed by Oscar Morales MD at [facility] 1827  07/28/2021    CT ABSCESS DRAIN SUBCUTANEOUS 7/28/2021 [facility] CT SCAN    ENDOSCOPIC ULTRASOUND (LOWER) N/A 12/09/2020    ENDOSCOPIC ULTRASOUND, UPPER WITH LINEAR SCOPE FOR BIOPSY OF MASS ON HEAD OF PANCREAS performed by Amina Persaud MD at [facility] (UPPER)  02/09/2022    ENDOSCOPIC ULTRASOUND, EGD - GI SCHEDULED,EGD STENT REMOVAL    ERCP N/A 08/11/2021    ERCP STENT INSERTION performed by Jaci Fernando MD at [facility]    ERCP  08/11/2021    ERCP SPHINCTER/PAPILLOTOMY performed by Jaci Fernando MD at [facility]    ERCP N/A 10/21/2021    ERCP STENT REMOVAL/EXCHANGE performed by Jaci Fernando MD at [facility] 66    ERCP  10/21/2021    ERCP STONE REMOVAL performed by Jaci Fernando MD at [facility] 668    ERCP  10/21/2021    ERCP ENDOSCOPIC RETROGRADE CHOLANGIOPANCREATOGRAPHY DIRECT VISUALIZATION performed by Francisco Garcia MD at [address] Left 07/03/2013    ORIF tibial plateau    FRACTURE SURGERY Right     small finger metacarpal fracture    HAND SURGERY      HYSTERECTOMY (CERVIX STATUS UNKNOWN)  2003    SPINE SURGERY N/A 09/10/2021    KYPHOPLASTY lumbar L5 performed by Bennett Weinberg MD at [facility]. 106 N/A 10/22/2020    EGD BIOPSY performed by Sara Guzman MD at [facility] 110 N/A 04/05/2021    EGD BIOPSY performed by Sara Guzman MD at [facility] N/A 09/08/2021    ENDOSCOPIC ULTRASOUND, LINEAR SCOPE performed by Lakhwinder Farooq MD at [address] [place] [address] N/A 2/9/2022    ENDOSCOPIC ULTRASOUND, EGD - GI SCHEDULED performed by Francisco Garcia MD at [facility]. 106  2/9/2022    EGD STENT REMOVAL performed by Francisco Garcia MD at [address] [place] [address] History   Problem Relation Age of Onset    Other Father     Cancer Maternal Grandmother     Heart Disease Paternal Grandmother     Esophageal Cancer Maternal Aunt     Arthritis Mother        Social History     Socioeconomic History    Marital status: Single     Spouse name: Not on file    Number of children: Not on file    Years of education: Not on file    Highest education level: Not on file   Occupational History    Not on file   Tobacco Use    Smoking status: Every Day     Packs/day: 1.00     Years: 30.00     Pack years: 30.00     Types: Cigarettes    Smokeless tobacco: Never    Tobacco comments:     CURRENTLY SMOKES 0.5 PPD    Vaping Use    Vaping Use: Never used   Substance and Sexual Activity    Alcohol use: Not Currently     Alcohol/week: 0.0 standard drinks    Drug use: Yes     Frequency: 2.0 times per week     Comment: recreation    Sexual activity: Not Currently Other Topics Concern    Not on file   Social History Narrative    Not on file     Social Determinants of Health     Financial Resource Strain: Low Risk     Difficulty of Paying Living Expenses: Not very hard   Food Insecurity: No Food Insecurity    Worried About Running Out of Food in the Last Year: Never true    Ran Out of Food in the Last Year: Never true   Transportation Needs: Not on file   Physical Activity: Not on file   Stress: Not on file   Social Connections: Not on file   Intimate Partner Violence: Not on file   Housing Stability: Not on file       Review of Systems    Objective:       Physical Exam  General appearance - {:881798}  Mental status - {:462971}  Eyes - {:423463}  Ears -not examined  Nose - {:530735}  Mouth - {:145257}  Neck - {:072517}  Chest - {:521675}  Heart -{:718175}  Abdomen - {:006883}  Neuro- {:124442}}  Extremities - {:196397}  Skin - {:513264}     Wt Readings from Last 3 Encounters:   01/08/23 134 lb 0.6 oz (60.8 kg)   11/01/22 152 lb (68.9 kg)   09/01/22 146 lb 9.6 oz (66.5 kg)       Results for orders placed or performed during the hospital encounter of 01/01/23   Culture, Blood 1    Specimen: Blood   Result Value Ref Range    Specimen Description . BLOOD     Special Requests ac 10ml     Culture NO GROWTH 5 DAYS    Culture, Blood 1    Specimen: Blood   Result Value Ref Range    Specimen Description . BLOOD     Special Requests LAC 10ML     Culture NO GROWTH 5 DAYS    COVID-19, Rapid    Specimen: Nasopharyngeal Swab   Result Value Ref Range    Specimen Description . NASOPHARYNGEAL SWAB     SARS-CoV-2, Rapid Not Detected Not Detected   Culture, Urine    Specimen: Urine, straight catheter   Result Value Ref Range    Specimen Description . URINE,STRAIGHT CATHETER     Culture NO GROWTH    RAPID INFLUENZA A/B ANTIGENS    Specimen: Nasopharyngeal   Result Value Ref Range    Flu A Antigen NEGATIVE NEGATIVE    Flu B Antigen NEGATIVE NEGATIVE   Respiratory Panel, Molecular, with COVID-19 (Restricted: peds pts or suitable admitted adults)    Specimen: Nasopharyngeal Swab   Result Value Ref Range    Specimen Description . NASOPHARYNGEAL SWAB     Adenovirus PCR Not Detected Not Detected    Coronavirus 229E PCR Not Detected Not Detected    Coronavirus HKU1 PCR Not Detected Not Detected    Coronavirus NL63 PCR Not Detected Not Detected    Coronavirus OC43 PCR Not Detected Not Detected    SARS-CoV-2, PCR Not Detected Not Detected    Human Metapneumovirus PCR Not Detected Not Detected    Rhino/Enterovirus PCR Not Detected Not Detected    Influenza A by PCR Not Detected Not Detected    Influenza B by PCR Not Detected Not Detected    Parainfluenza 1 PCR Not Detected Not Detected    Parainfluenza 2 PCR Not Detected Not Detected    Parainfluenza 3 PCR Not Detected Not Detected    Parainfluenza 4 PCR Not Detected Not Detected    Resp Syncytial Virus PCR Not Detected Not Detected    Bordetella Parapertussis Not Detected Not Detected    B Pertussis by PCR Not Detected Not Detected    Chlamydia pneumoniae By PCR Not Detected Not Detected    Mycoplasma pneumo by PCR Not Detected Not Detected   MRSA DNA Probe, Nasal    Specimen: Nasal   Result Value Ref Range    Specimen Description . NASAL SWAB     MRSA, DNA, Nasal NEGATIVE NEGATIVE   Strep Pneumoniae Antigen    Specimen: Urine, clean catch   Result Value Ref Range    Source . URINE     Strep pneumo Ag NEGATIVE    Culture, Respiratory    Specimen: Endotracheal   Result Value Ref Range    Specimen Description . ENDOTRACHEAL     Direct Exam < 10 EPITHELIAL CELLS/LPF     Direct Exam >25 NEUTROPHILS/LPF     Direct Exam NO SIGNIFICANT PATHOGENS SEEN     Culture NORMAL RESPIRATORY SUSIE MODERATE GROWTH    Ammonia   Result Value Ref Range    Ammonia 69 (H) 11 - 51 umol/L   Comprehensive Metabolic Panel   Result Value Ref Range    Glucose 121 (H) 70 - 99 mg/dL    BUN 37 (H) 6 - 20 mg/dL    Creatinine 0.69 0.50 - 0.90 mg/dL    Est, Glom Filt Rate >60 >60 mL/min/1.73m2 Calcium 6.8 (L) 8.6 - 10.4 mg/dL    Sodium 136 135 - 144 mmol/L    Potassium 5.3 3.7 - 5.3 mmol/L    Chloride 102 98 - 107 mmol/L    CO2 18 (L) 20 - 31 mmol/L    Anion Gap 16 9 - 17 mmol/L    Alkaline Phosphatase 197 (H) 35 - 104 U/L    ALT 19 5 - 33 U/L    AST 67 (H) <32 U/L    Total Bilirubin 0.7 0.3 - 1.2 mg/dL    Total Protein 6.6 6.4 - 8.3 g/dL    Albumin 3.1 (L) 3.5 - 5.2 g/dL    Albumin/Globulin Ratio 0.9 (L) 1.0 - 2.5   CBC with Auto Differential   Result Value Ref Range    WBC 17.5 (H) 3.5 - 11.3 k/uL    RBC 3.86 (L) 3.95 - 5.11 m/uL    Hemoglobin 11.2 (L) 11.9 - 15.1 g/dL    Hematocrit 35.3 (L) 36.3 - 47.1 %    MCV 91.5 82.6 - 102.9 fL    MCH 29.0 25.2 - 33.5 pg    MCHC 31.7 28.4 - 34.8 g/dL    RDW 16.4 (H) 11.8 - 14.4 %    Platelets 953 154 - 859 k/uL    MPV 10.6 8.1 - 13.5 fL    NRBC Automated 0.1 (H) 0.0 per 100 WBC    Seg Neutrophils 82 (H) 36 - 65 %    Lymphocytes 12 (L) 24 - 43 %    Monocytes 5 3 - 12 %    Eosinophils % 0 (L) 1 - 4 %    Basophils 0 0 - 2 %    Immature Granulocytes 1 (H) 0 %    Segs Absolute 14.24 (H) 1.50 - 8.10 k/uL    Absolute Lymph # 2.14 1.10 - 3.70 k/uL    Absolute Mono # 0.95 0.10 - 1.20 k/uL    Absolute Eos # <0.03 0.00 - 0.44 k/uL    Basophils Absolute <0.03 0.00 - 0.20 k/uL    Absolute Immature Granulocyte 0.13 0.00 - 0.30 k/uL    RBC Morphology ANISOCYTOSIS PRESENT    Lactate, Sepsis   Result Value Ref Range    Lactic Acid, Sepsis, Whole Blood 3.0 (H) 0.5 - 1.9 mmol/L   Lactate, Sepsis   Result Value Ref Range    Lactic Acid, Sepsis, Whole Blood 1.6 0.5 - 1.9 mmol/L   Magnesium   Result Value Ref Range    Magnesium 2.2 1.6 - 2.6 mg/dL   Urinalysis with Microscopic   Result Value Ref Range    Color, UA DARK YELLOW Yellow    Turbidity UA Clear Clear    Glucose, Ur NEGATIVE NEGATIVE    Bilirubin Urine NEGATIVE  Verified by ictotest. (A) NEGATIVE    Ketones, Urine SMALL (A) NEGATIVE    Specific Gravity, UA 1.020 1.005 - 1.030    Urine Hgb NEGATIVE NEGATIVE    pH, UA 5.5 5.0 - 8.0    Protein, UA 1+ (A) NEGATIVE    Urobilinogen, Urine ELEVATED (A) Normal    Nitrite, Urine NEGATIVE NEGATIVE    Leukocyte Esterase, Urine SMALL (A) NEGATIVE    WBC, UA 2 TO 5 0 - 5 /HPF    RBC, UA 0 TO 2 0 - 2 /HPF    Casts UA None 0 - 2 /LPF    Epithelial Cells UA 0 TO 2 0 - 5 /HPF    Bacteria, UA MODERATE (A) None    Yeast, UA MANY (A) None   Troponin   Result Value Ref Range    Troponin, High Sensitivity 25 (H) 0 - 14 ng/L   Troponin   Result Value Ref Range    Troponin, High Sensitivity 26 (H) 0 - 14 ng/L   APTT   Result Value Ref Range    PTT 25.8 20.5 - 30.5 sec   Protime-INR   Result Value Ref Range    Protime 11.1 9.1 - 12.3 sec    INR 1.0    TSH with Reflex   Result Value Ref Range    TSH 0.56 0.30 - 5.00 uIU/mL   TOX SCR, BLD, ED   Result Value Ref Range    Acetaminophen Level <5 (L) 10 - 30 ug/mL    Ethanol <10 <10 mg/dL    Ethanol percent <3.394 <2.685 %    Salicylate Lvl <1 (L) 3 - 10 mg/dL    Toxic Tricyclic Sc,Blood NEGATIVE NEGATIVE   Urine Drug Screen   Result Value Ref Range    Amphetamine Screen, Ur NEGATIVE NEGATIVE    Barbiturate Screen, Ur NEGATIVE NEGATIVE    Benzodiazepine Screen, Urine NEGATIVE NEGATIVE    Cocaine Metabolite, Urine NEGATIVE NEGATIVE    Methadone Screen, Urine NEGATIVE NEGATIVE    Opiates, Urine NEGATIVE NEGATIVE    Phencyclidine, Urine NEGATIVE NEGATIVE    Cannabinoid Scrn, Ur POSITIVE (A) NEGATIVE    Oxycodone Screen, Ur NEGATIVE NEGATIVE    Fentanyl, Ur NEGATIVE NEGATIVE    Test Information       Assay provides medical screening only. The absence of expected drug(s) and/or metabolite(s) may indicate diluted or adulterated urine, limitations of testing or timing of collection.    Brain Natriuretic Peptide   Result Value Ref Range    Pro-BNP 16,515 (H) <300 pg/mL   ELECTROLYTES PLUS   Result Value Ref Range    POC Sodium 138 138 - 146 mmol/L    POC Potassium 4.8 (H) 3.5 - 4.5 mmol/L    POC Chloride 109 (H) 98 - 107 mmol/L    POC TCO2 20 (L) 22 - 30 mmol/L    Anion Gap 10 7 - 16 mmol/L   Hemoglobin and hematocrit, blood   Result Value Ref Range    POC Hemoglobin 11.5 (L) 12.0 - 16.0 g/dL    POC Hematocrit 34 (L) 36 - 46 %   CALCIUM, IONIC (POC)   Result Value Ref Range    POC Ionized Calcium 0.96 (L) 1.15 - 1.33 mmol/L   Calcium, Ionized   Result Value Ref Range    Calcium, Ionized 0.94 (L) 1.13 - 1.33 mmol/L   Calcium, Ionized   Result Value Ref Range    Calcium, Ionized 0.93 (L) 1.13 - 1.33 mmol/L   Ethanol   Result Value Ref Range    Ethanol <10 <10 mg/dL    Ethanol percent <0.010 <0.010 %   Basic Metabolic Panel   Result Value Ref Range    Glucose 89 70 - 99 mg/dL    BUN 38 (H) 6 - 20 mg/dL    Creatinine 0.54 0.50 - 0.90 mg/dL    Est, Glom Filt Rate >60 >60 mL/min/1.73m2    Calcium 7.5 (L) 8.6 - 10.4 mg/dL    Sodium 134 (L) 135 - 144 mmol/L    Potassium 4.5 3.7 - 5.3 mmol/L    Chloride 106 98 - 107 mmol/L    CO2 19 (L) 20 - 31 mmol/L    Anion Gap 9 9 - 17 mmol/L   Magnesium   Result Value Ref Range    Magnesium 2.9 (H) 1.6 - 2.6 mg/dL   TSH without Reflex   Result Value Ref Range    TSH 1.40 0.30 - 5.00 uIU/mL   Ethanol   Result Value Ref Range    Ethanol <10 <10 mg/dL    Ethanol percent <0.010 <0.010 %   MYCOPLASMA PNEUMONIAE ANTIBODY, IGM   Result Value Ref Range    Mycoplasma pneumo IgM 0.30 <0.91   CBC with Auto Differential   Result Value Ref Range    WBC 14.0 (H) 3.5 - 11.3 k/uL    RBC 3.67 (L) 3.95 - 5.11 m/uL    Hemoglobin 10.4 (L) 11.9 - 15.1 g/dL    Hematocrit 33.5 (L) 36.3 - 47.1 %    MCV 91.3 82.6 - 102.9 fL    MCH 28.3 25.2 - 33.5 pg    MCHC 31.0 28.4 - 34.8 g/dL    RDW 16.1 (H) 11.8 - 14.4 %    Platelets 130 720 - 450 k/uL    MPV 10.6 8.1 - 13.5 fL    NRBC Automated 0.0 0.0 per 100 WBC    Seg Neutrophils 69 (H) 36 - 65 %    Lymphocytes 25 24 - 43 %    Monocytes 5 3 - 12 %    Eosinophils % 0 (L) 1 - 4 %    Basophils 0 0 - 2 %    Immature Granulocytes 1 (H) 0 %    Segs Absolute 9.70 (H) 1.50 - 8.10 k/uL    Absolute Lymph # 3.53 1.10 - 3.70 k/uL    Absolute Mono # 0. 65 0.10 - 1.20 k/uL    Absolute Eos # <0.03 0.00 - 0.44 k/uL    Basophils Absolute 0.03 0.00 - 0.20 k/uL    Absolute Immature Granulocyte 0.12 0.00 - 0.30 k/uL    RBC Morphology ANISOCYTOSIS PRESENT    Hepatic Function Panel   Result Value Ref Range    Albumin 2.3 (L) 3.5 - 5.2 g/dL    Alkaline Phosphatase 168 (H) 35 - 104 U/L    ALT 23 5 - 33 U/L    AST 64 (H) <32 U/L    Total Bilirubin 0.6 0.3 - 1.2 mg/dL    Bilirubin, Direct 0.4 (H) <0.3 mg/dL    Bilirubin, Indirect 0.2 0.0 - 1.0 mg/dL    Total Protein 5.5 (L) 6.4 - 8.3 g/dL    Albumin/Globulin Ratio 0.7 (L) 1.0 - 2.5   Troponin   Result Value Ref Range    Troponin, High Sensitivity 16 (H) 0 - 14 ng/L   Troponin   Result Value Ref Range    Troponin, High Sensitivity 15 (H) 0 - 14 ng/L   Ammonia   Result Value Ref Range    Ammonia 47 11 - 51 umol/L   Basic Metabolic Panel   Result Value Ref Range    Glucose 110 (H) 70 - 99 mg/dL    BUN 27 (H) 6 - 20 mg/dL    Creatinine 0.42 (L) 0.50 - 0.90 mg/dL    Est, Glom Filt Rate >60 >60 mL/min/1.73m2    Calcium 7.0 (L) 8.6 - 10.4 mg/dL    Sodium 144 135 - 144 mmol/L    Potassium 4.2 3.7 - 5.3 mmol/L    Chloride 106 98 - 107 mmol/L    CO2 23 20 - 31 mmol/L    Anion Gap 15 9 - 17 mmol/L   Magnesium   Result Value Ref Range    Magnesium 2.0 1.6 - 2.6 mg/dL   CBC with Auto Differential   Result Value Ref Range    WBC 7.7 3.5 - 11.3 k/uL    RBC 3.99 3.95 - 5.11 m/uL    Hemoglobin 11.2 (L) 11.9 - 15.1 g/dL    Hematocrit 35.8 (L) 36.3 - 47.1 %    MCV 89.7 82.6 - 102.9 fL    MCH 28.1 25.2 - 33.5 pg    MCHC 31.3 28.4 - 34.8 g/dL    RDW 15.9 (H) 11.8 - 14.4 %    Platelets 223 635 - 885 k/uL    MPV 10.4 8.1 - 13.5 fL    NRBC Automated 0.0 0.0 per 100 WBC    RBC Morphology ANISOCYTOSIS PRESENT     Seg Neutrophils 72 (H) 36 - 65 %    Lymphocytes 21 (L) 24 - 43 %    Monocytes 6 3 - 12 %    Eosinophils % 0 (L) 1 - 4 %    Basophils 0 0 - 2 %    Immature Granulocytes 1 (H) 0 %    Segs Absolute 5.53 1.50 - 8.10 k/uL    Absolute Lymph # 1.64 1.10 - 3.70 k/uL    Absolute Mono # 0.45 0.10 - 1.20 k/uL    Absolute Eos # <0.03 0.00 - 0.44 k/uL    Basophils Absolute <0.03 0.00 - 0.20 k/uL    Absolute Immature Granulocyte 0.04 0.00 - 0.30 k/uL   Brain Natriuretic Peptide   Result Value Ref Range    Pro-BNP 3,543 (H) <300 pg/mL   Basic Metabolic Panel   Result Value Ref Range    Glucose 114 (H) 70 - 99 mg/dL    BUN 23 (H) 6 - 20 mg/dL    Creatinine 0.46 (L) 0.50 - 0.90 mg/dL    Est, Glom Filt Rate >60 >60 mL/min/1.73m2    Calcium 7.2 (L) 8.6 - 10.4 mg/dL    Sodium 139 135 - 144 mmol/L    Potassium 3.8 3.7 - 5.3 mmol/L    Chloride 101 98 - 107 mmol/L    CO2 28 20 - 31 mmol/L    Anion Gap 10 9 - 17 mmol/L   Magnesium   Result Value Ref Range    Magnesium 1.8 1.6 - 2.6 mg/dL   CBC with Auto Differential   Result Value Ref Range    WBC 6.3 3.5 - 11.3 k/uL    RBC 4.08 3.95 - 5.11 m/uL    Hemoglobin 11.8 (L) 11.9 - 15.1 g/dL    Hematocrit 36.1 (L) 36.3 - 47.1 %    MCV 88.5 82.6 - 102.9 fL    MCH 28.9 25.2 - 33.5 pg    MCHC 32.7 28.4 - 34.8 g/dL    RDW 15.5 (H) 11.8 - 14.4 %    Platelets 981 308 - 434 k/uL    MPV 10.3 8.1 - 13.5 fL    NRBC Automated 0.0 0.0 per 100 WBC    Seg Neutrophils 61 36 - 65 %    Lymphocytes 32 24 - 43 %    Monocytes 7 3 - 12 %    Eosinophils % 0 (L) 1 - 4 %    Basophils 0 0 - 2 %    Immature Granulocytes 0 0 %    Segs Absolute 3.84 1.50 - 8.10 k/uL    Absolute Lymph # 2.00 1. 10 - 3.70 k/uL    Absolute Mono # 0.46 0.10 - 1.20 k/uL    Absolute Eos # <0.03 0.00 - 0.44 k/uL    Basophils Absolute <0.03 0.00 - 0.20 k/uL    Absolute Immature Granulocyte <0.03 0.00 - 0.30 k/uL    RBC Morphology ANISOCYTOSIS PRESENT    SPECIMEN REJECTION   Result Value Ref Range    Specimen Source . BLOOD     Ordered Test BMP,MG     Reason for Rejection SPECIMEN GROSSLY HEMOLYZED    Basic Metabolic Panel   Result Value Ref Range    Glucose 116 (H) 70 - 99 mg/dL    BUN 23 (H) 6 - 20 mg/dL    Creatinine 0.33 (L) 0.50 - 0.90 mg/dL    Est, Glom Filt Rate >60 >60 mL/min/1.73m2    Calcium 7.4 (L) 8.6 - 10.4 mg/dL    Sodium 141 135 - 144 mmol/L    Potassium 3.5 (L) 3.7 - 5.3 mmol/L    Chloride 101 98 - 107 mmol/L    CO2 29 20 - 31 mmol/L    Anion Gap 11 9 - 17 mmol/L   Magnesium   Result Value Ref Range    Magnesium 1.7 1.6 - 2.6 mg/dL   Potassium   Result Value Ref Range    Potassium 3.9 3.7 - 5.3 mmol/L   Lipase   Result Value Ref Range    Lipase 67 (H) 13 - 60 U/L   Hepatic Function Panel   Result Value Ref Range    Albumin 3.1 (L) 3.5 - 5.2 g/dL    Alkaline Phosphatase 157 (H) 35 - 104 U/L    ALT 30 5 - 33 U/L    AST 33 (H) <32 U/L    Total Bilirubin 0.4 0.3 - 1.2 mg/dL    Bilirubin, Direct 0.2 <0.3 mg/dL    Bilirubin, Indirect 0.2 0.0 - 1.0 mg/dL    Total Protein 6.5 6.4 - 8.3 g/dL    Albumin/Globulin Ratio 0.9 (L) 1.0 - 2.5   Basic Metabolic Panel   Result Value Ref Range    Glucose 152 (H) 70 - 99 mg/dL    BUN 23 (H) 6 - 20 mg/dL    Creatinine 0.26 (L) 0.50 - 0.90 mg/dL    Est, Glom Filt Rate >60 >60 mL/min/1.73m2    Calcium 8.9 8.6 - 10.4 mg/dL    Sodium 140 135 - 144 mmol/L    Potassium 3.8 3.7 - 5.3 mmol/L    Chloride 102 98 - 107 mmol/L    CO2 28 20 - 31 mmol/L    Anion Gap 10 9 - 17 mmol/L   Magnesium   Result Value Ref Range    Magnesium 1.6 1.6 - 2.6 mg/dL   CBC with Auto Differential   Result Value Ref Range    WBC 9.8 3.5 - 11.3 k/uL    RBC 4.04 3.95 - 5.11 m/uL    Hemoglobin 11.5 (L) 11.9 - 15.1 g/dL    Hematocrit 36.0 (L) 36.3 - 47.1 %    MCV 89.1 82.6 - 102.9 fL    MCH 28.5 25.2 - 33.5 pg    MCHC 31.9 28.4 - 34.8 g/dL    RDW 15.4 (H) 11.8 - 14.4 %    Platelets 463 354 - 415 k/uL    MPV 10.5 8.1 - 13.5 fL    NRBC Automated 0.0 0.0 per 100 WBC    RBC Morphology ANISOCYTOSIS PRESENT     Seg Neutrophils 63 36 - 65 %    Lymphocytes 27 24 - 43 %    Monocytes 8 3 - 12 %    Eosinophils % 0 (L) 1 - 4 %    Basophils 0 0 - 2 %    Immature Granulocytes 1 (H) 0 %    Segs Absolute 6.20 1.50 - 8.10 k/uL    Absolute Lymph # 2.69 1.10 - 3.70 k/uL    Absolute Mono # 0.83 0.10 - 1.20 k/uL    Absolute Eos # 0.04 0.00 - 0.44 k/uL    Basophils Absolute <0.03 0.00 - 0.20 k/uL    Absolute Immature Granulocyte 0.05 0.00 - 0.30 k/uL   CBC with Auto Differential   Result Value Ref Range    WBC 12.1 (H) 3.5 - 11.3 k/uL    RBC 4.07 3.95 - 5.11 m/uL    Hemoglobin 11.7 (L) 11.9 - 15.1 g/dL    Hematocrit 36.8 36.3 - 47.1 %    MCV 90.4 82.6 - 102.9 fL    MCH 28.7 25.2 - 33.5 pg    MCHC 31.8 28.4 - 34.8 g/dL    RDW 15.5 (H) 11.8 - 14.4 %    Platelets 635 555 - 971 k/uL    MPV 10.6 8.1 - 13.5 fL    NRBC Automated 0.0 0.0 per 100 WBC    Seg Neutrophils 71 (H) 36 - 65 %    Lymphocytes 20 (L) 24 - 43 %    Monocytes 8 3 - 12 %    Eosinophils % 0 (L) 1 - 4 %    Basophils 0 0 - 2 %    Immature Granulocytes 1 (H) 0 %    Segs Absolute 8.64 (H) 1.50 - 8.10 k/uL    Absolute Lymph # 2.36 1.10 - 3.70 k/uL    Absolute Mono # 0.94 0.10 - 1.20 k/uL    Absolute Eos # <0.03 0.00 - 0.44 k/uL    Basophils Absolute 0.03 0.00 - 0.20 k/uL    Absolute Immature Granulocyte 0.10 0.00 - 0.30 k/uL    RBC Morphology ANISOCYTOSIS PRESENT    Basic Metabolic Panel   Result Value Ref Range    Glucose 173 (H) 70 - 99 mg/dL    BUN 22 (H) 6 - 20 mg/dL    Creatinine 0.29 (L) 0.50 - 0.90 mg/dL    Est, Glom Filt Rate >60 >60 mL/min/1.73m2    Calcium 9.1 8.6 - 10.4 mg/dL    Sodium 137 135 - 144 mmol/L    Potassium 3.7 3.7 - 5.3 mmol/L    Chloride 101 98 - 107 mmol/L    CO2 26 20 - 31 mmol/L    Anion Gap 10 9 - 17 mmol/L   CBC with Auto Differential   Result Value Ref Range    WBC 12.3 (H) 3.5 - 11.3 k/uL    RBC 3.72 (L) 3.95 - 5.11 m/uL    Hemoglobin 10.6 (L) 11.9 - 15.1 g/dL    Hematocrit 33.5 (L) 36.3 - 47.1 %    MCV 90.1 82.6 - 102.9 fL    MCH 28.5 25.2 - 33.5 pg    MCHC 31.6 28.4 - 34.8 g/dL    RDW 15.6 (H) 11.8 - 14.4 %    Platelets 429 (H) 489 - 453 k/uL    MPV 10.6 8.1 - 13.5 fL    NRBC Automated 0.0 0.0 per 100 WBC    Seg Neutrophils 56 36 - 65 %    Lymphocytes 32 24 - 43 %    Monocytes 10 3 - 12 %    Eosinophils % 1 1 - 4 % Basophils 0 0 - 2 %    Immature Granulocytes 1 (H) 0 %    Segs Absolute 6.99 1.50 - 8.10 k/uL    Absolute Lymph # 3.93 (H) 1.10 - 3.70 k/uL    Absolute Mono # 1.18 0.10 - 1.20 k/uL    Absolute Eos # 0.07 0.00 - 0.44 k/uL    Basophils Absolute 0.03 0.00 - 0.20 k/uL    Absolute Immature Granulocyte 0.13 0.00 - 0.30 k/uL    RBC Morphology ANISOCYTOSIS PRESENT    Basic Metabolic Panel   Result Value Ref Range    Glucose 149 (H) 70 - 99 mg/dL    BUN 15 6 - 20 mg/dL    Creatinine 0.40 (L) 0.50 - 0.90 mg/dL    Est, Glom Filt Rate >60 >60 mL/min/1.73m2    Calcium 8.4 (L) 8.6 - 10.4 mg/dL    Sodium 141 135 - 144 mmol/L    Potassium 3.7 3.7 - 5.3 mmol/L    Chloride 106 98 - 107 mmol/L    CO2 23 20 - 31 mmol/L    Anion Gap 12 9 - 17 mmol/L   Iron and TIBC   Result Value Ref Range    Iron 35 (L) 37 - 145 ug/dL    TIBC 242 (L) 250 - 450 ug/dL    Iron Saturation 14 (L) 20 - 55 %    UIBC 207 112 - 347 ug/dL   Ferritin   Result Value Ref Range    Ferritin 182 (H) 13 - 150 ng/mL   CBC with Auto Differential   Result Value Ref Range    WBC 12.9 (H) 3.5 - 11.3 k/uL    RBC 3.75 (L) 3.95 - 5.11 m/uL    Hemoglobin 10.7 (L) 11.9 - 15.1 g/dL    Hematocrit 34.6 (L) 36.3 - 47.1 %    MCV 92.3 82.6 - 102.9 fL    MCH 28.5 25.2 - 33.5 pg    MCHC 30.9 28.4 - 34.8 g/dL    RDW 15.9 (H) 11.8 - 14.4 %    Platelets 288 (H) 330 - 453 k/uL    MPV 10.6 8.1 - 13.5 fL    NRBC Automated 0.0 0.0 per 100 WBC    Seg Neutrophils 67 (H) 36 - 65 %    Lymphocytes 22 (L) 24 - 43 %    Monocytes 10 3 - 12 %    Eosinophils % 0 (L) 1 - 4 %    Basophils 0 0 - 2 %    Immature Granulocytes 1 (H) 0 %    Segs Absolute 8.66 (H) 1.50 - 8.10 k/uL    Absolute Lymph # 2.80 1.10 - 3.70 k/uL    Absolute Mono # 1.24 (H) 0.10 - 1.20 k/uL    Absolute Eos # <0.03 0.00 - 0.44 k/uL    Basophils Absolute <0.03 0.00 - 0.20 k/uL    Absolute Immature Granulocyte 0.13 0.00 - 0.30 k/uL    RBC Morphology ANISOCYTOSIS PRESENT    Basic Metabolic Panel   Result Value Ref Range    Glucose 147 (H) 70 - 99 mg/dL    BUN 10 6 - 20 mg/dL    Creatinine 0.39 (L) 0.50 - 0.90 mg/dL    Est, Glom Filt Rate >60 >60 mL/min/1.73m2    Calcium 8.4 (L) 8.6 - 10.4 mg/dL    Sodium 137 135 - 144 mmol/L    Potassium 3.5 (L) 3.7 - 5.3 mmol/L    Chloride 106 98 - 107 mmol/L    CO2 21 20 - 31 mmol/L    Anion Gap 10 9 - 17 mmol/L   Venous Blood Gas, POC   Result Value Ref Range    pH, Calvin 7.225 (L) 7.320 - 7.430    pCO2, Calvin 50.4 41.0 - 51.0 mm Hg    pO2, Calvin 33.8 30.0 - 50.0 mm Hg    HCO3, Venous 20.8 (L) 22.0 - 29.0 mmol/L    Negative Base Excess, Calvin 7 (H) 0.0 - 2.0    O2 Sat, Calvin 53 (L) 60.0 - 85.0 %   Creatinine W/GFR Point of Care   Result Value Ref Range    POC Creatinine 0.71 0.51 - 1.19 mg/dL    eGFR, POC >60 mL/min/1.73m2   POCT urea (BUN)   Result Value Ref Range    POC BUN 38 (H) 8 - 26 mg/dL   Lactic Acid, POC   Result Value Ref Range    POC Lactic Acid 1.83 (H) 0.56 - 1.39 mmol/L   POCT Glucose   Result Value Ref Range    POC Glucose 122 (H) 74 - 100 mg/dL   Venous Blood Gas, POC   Result Value Ref Range    pH, Calvin 7.232 (L) 7.320 - 7.430    pCO2, Calvin 41.3 41.0 - 51.0 mm Hg    pO2, Calvin 50.5 (H) 30.0 - 50.0 mm Hg    HCO3, Venous 17.4 (L) 22.0 - 29.0 mmol/L    Negative Base Excess, Calvin 10 (H) 0.0 - 2.0    O2 Sat, Calvin 78 60.0 - 85.0 %   Arterial Blood Gas, POC   Result Value Ref Range    POC pH 7.346 (L) 7.350 - 7.450    POC pCO2 29.5 (L) 35.0 - 48.0 mm Hg    POC PO2 82.3 (L) 83.0 - 108.0 mm Hg    POC HCO3 16.2 (L) 21.0 - 28.0 mmol/L    Negative Base Excess, Art 8 (H) 0.0 - 2.0    POC O2 SAT 96 94.0 - 98.0 %    O2 Device/Flow/% NRB     Manav Test POSITIVE     Sample Site Right Radial Artery     FIO2 100.0    Arterial Blood Gas, POC   Result Value Ref Range    POC pH 7.327 (L) 7.350 - 7.450    POC pCO2 60.8 (H) 35.0 - 48.0 mm Hg    POC PO2 157.0 (H) 83.0 - 108.0 mm Hg    POC HCO3 31.8 (H) 21.0 - 28.0 mmol/L    Positive Base Excess, Art 4 (H) 0.0 - 3.0    POC O2 SAT 99 (H) 94.0 - 98.0 %    Manav Test POSITIVE     Sample Site Left Radial Artery     FIO2 100.0    Lactic Acid, POC   Result Value Ref Range    POC Lactic Acid 0.86 0.56 - 1.39 mmol/L   POCT Glucose   Result Value Ref Range    POC Glucose 121 (H) 74 - 100 mg/dL   Arterial Blood Gas, POC   Result Value Ref Range    POC pH 7.369 7.350 - 7.450    POC pCO2 53.8 (H) 35.0 - 48.0 mm Hg    POC PO2 93.1 83.0 - 108.0 mm Hg    POC HCO3 31.0 (H) 21.0 - 28.0 mmol/L    Positive Base Excess, Art 4 (H) 0.0 - 3.0    POC O2 SAT 97 94.0 - 98.0 %    O2 Device/Flow/% Adult Ventilator     Manav Test POSITIVE     Sample Site Left Radial Artery     FIO2 80.0    POCT Glucose   Result Value Ref Range    POC Glucose 138 (H) 74 - 100 mg/dL   POC Glucose Fingerstick   Result Value Ref Range    POC Glucose 131 (H) 65 - 105 mg/dL   Arterial Blood Gas, POC   Result Value Ref Range    POC pH 7.410 7.350 - 7.450    POC pCO2 48.6 (H) 35.0 - 48.0 mm Hg    POC PO2 99.5 83.0 - 108.0 mm Hg    POC HCO3 30.8 (H) 21.0 - 28.0 mmol/L    Positive Base Excess, Art 5 (H) 0.0 - 3.0    POC O2 SAT 98 94.0 - 98.0 %    O2 Device/Flow/% Adult Ventilator     Manav Test POSITIVE     Sample Site Left Radial Artery     FIO2 50.0    POCT Glucose   Result Value Ref Range    POC Glucose 159 (H) 74 - 100 mg/dL   Arterial Blood Gas, POC   Result Value Ref Range    POC pH 7.401 7.350 - 7.450    POC pCO2 45.4 35.0 - 48.0 mm Hg    POC PO2 103.9 83.0 - 108.0 mm Hg    POC HCO3 28.2 (H) 21.0 - 28.0 mmol/L    Positive Base Excess, Art 3 0.0 - 3.0    POC O2 SAT 98 94.0 - 98.0 %    Manav Test POSITIVE     Sample Site Right Radial Artery     FIO2 40.0    POCT Glucose   Result Value Ref Range    POC Glucose 186 (H) 74 - 100 mg/dL   POC Glucose Fingerstick   Result Value Ref Range    POC Glucose 162 (H) 65 - 105 mg/dL   EKG 12 Lead   Result Value Ref Range    Ventricular Rate 80 BPM    Atrial Rate 80 BPM    P-R Interval 216 ms    QRS Duration 104 ms    Q-T Interval 450 ms    QTc Calculation (Bazett) 519 ms    P Axis 58 degrees    R Axis 63 degrees    T Axis 88 degrees   EKG 12 Lead   Result Value Ref Range    Ventricular Rate 60 BPM    Atrial Rate 60 BPM    P-R Interval 238 ms    QRS Duration 100 ms    Q-T Interval 516 ms    QTc Calculation (Bazett) 516 ms    P Axis 53 degrees    R Axis 53 degrees    T Axis 88 degrees   EKG 12 Lead   Result Value Ref Range    Ventricular Rate 61 BPM    Atrial Rate 61 BPM    P-R Interval 228 ms    QRS Duration 102 ms    Q-T Interval 504 ms    QTc Calculation (Bazett) 507 ms    P Axis 47 degrees    R Axis 37 degrees    T Axis 35 degrees   EKG 12 Lead   Result Value Ref Range    Ventricular Rate 87 BPM    Atrial Rate 87 BPM    P-R Interval 214 ms    QRS Duration 108 ms    Q-T Interval 394 ms    QTc Calculation (Bazett) 474 ms    P Axis 57 degrees    R Axis 65 degrees    T Axis 74 degrees   ECHO Complete 2D W Doppler W Color   Result Value Ref Range    Left Ventricular Ejection Fraction 65     LVEF MODALITY ECHO        No results found. Assessment:      No diagnosis found. Plan:      ***  Educated and clarified the medication use. Refills sent to Rx if requested. Recommend flu vaccination in the fall annually. ***  Patient is up-to-date with pneumococcal vaccinations from pulmonary perspective. Patient has *** received initial Covid vaccination *** Bivalent booster when eligible. Discussed strategies to protect against Covid 19. Maintain an active lifestyle. Patient's questions were answered to her satisfaction. Home O2 evaluation to be done - {YES/NO:19726}  Supplemental oxygen was {Blank single:19197::\"ordered at ***\",\"continued at ***\"}   {Blank single:19197::\"Patient is not a smoker\",\"Smoking cessation was recommended/continued\"}  Pulmonary function tests were reviewed per physician  Chest x-ray was reviewed  CT scan of the chest was reviewed  {Blanksingle:19197::\"Polysomnogram with CPAP/BiPAP titration if needed. Brochure provided on sleep apnea. Weight loss was recommended and discussed. Recommended following good sleep hygiene instructions. Sleep hygieneinstructions were given to the patient. We'll see the patient back after the sleep study. \",\"CPAP/BiPAP to be used for at least 4 hours every night. Use humidifier if needed. Weight loss was recommended and discussed. Recommended following good sleep hygiene instructions. Sleep hygiene instructions given to the patient. Explained importance of compliance with treatment of sleep apnea. Compliance data was reviewed and the patient *** compliant per insurance requirement. \",\" Compliance data not available for my review.  Per patient report they use the machine faithfully every night, and report refreshing and restorative sleep without residual daytime fatigue\"}  We'll see the patient back in*** months          Electronically signed by LOI Doll CNP on 2/7/2023 at 8:28 AM

## 2023-02-07 NOTE — PROGRESS NOTES
2023    TELEHEALTH EVALUATION -- Audio/Visual (During [id number] public health emergency)    Patient and physician are located in their individual locations. This is visit is completed via Scaffold application []/ Doxy. me[] / Telephone []     HPI:    Jared Briseno (:  1970) has requested an audio/video evaluation for the following concern(s):    HPI  Patient here for for follow-up for post hospital evaluation. Patient with a history of COPD, chronic hypoxemic respiratory failure and smoking. Patient was last seen in the office in 2022 last seen in office per per me and in 2022 per Dr. Cristofer Branch. Patient was hospitalized 2023 through 2023. Patient presented with shortness of breath with acute COPD exacerbation and pneumonia, required intubation. She was treated with a course of antibiotics and prednisone taper. Patient was intubated 2023 and extubated on 2023. She was on a CIWA protocol and treated with a course of Zosyn during her hospitalization. Patient reports that since she has been discharged home she is improving daily, endorses that she still has a cough that is at times productive of pale light green secretions. She is wearing her oxygen periodically throughout the day, not requiring it all the time as she is monitoring her SPO2. She states that she puts it on whenever her oxygen level is 90% or less. She is at 3 L of oxygen. She previously had been prescribed oxygen for nocturnal hypoxemia. Patient unfortunately is still smoking, strongly advised smoking cessation. She is also endorsing a complaint of circumoral numbness and tingling not associated with desaturation although patient admits that she is not checking her oxygen level at that time. She has had it occur while she was in the hospital with oxygen on. When this sensation comes on she then feels fasciculations within her face.   Patient is being followed by neurology, suggested that patient follow-up with neurology as this may be subclinical seizures. In the absence of documented desaturation and occurrence while on oxygen, I do not think this is related to her oxygenation. Medications:   Requip 1 mg nightly  Trelegy Ellipta  Albuterol HFA:   Oxygen 3 LPM     PRIOR WORKUP:  PFT:  6-minute walk test 11/1/2022: Patient was on room air and dropped down to 87% with ambulation. Required supplemental oxygen to maintain an SPO2 of greater than 90% with 3 L. PFT 11/1/2022: FVC 2.10 L, 65% of predicted. FEV1 1.52 L, 58% of predicted. FEV1/FVC ratio 72, 89% of predicted. FEF 25-75 is 1.08, 39% of predicted. RV is 2.20, 118% of predicted. TLC is 3.65, 71% of predicted. DSB is 5.26, 26% of predicted. Final impression: Mild discrete ventilatory restrictive dysfunction. Diffusion capacity is reduced out of proportion to ventilatory dysfunction     CT Imaging:  CT chest 1/1/2023: Negative for pulmonary embolism. Enlarged right hilar lymph node with other lymph nodes that are shotty in appearance. Groundglass background opacities distributed diffusely but heterogenously in a mosaic type pattern. Suggesting pulmonary venous congestion. Negative for pleural effusion or pneumothorax. Esophagus is patulous from the hilar area to the EG junction. CT chest 11/11/2022. Emphysematous changes with small blebs in the right lower lobe. Right lower lobe noncalcified solid pulmonary nodule measuring 4.3 mm. Pleural-parenchymal banding at bases. Scarring present in the lingula. Multiple thoracic compression fractions worsened since prior exam.  Patient will need to follow-up with primary for further management. Repeat CT imaging in 1 year and smoking cessation if not already done      CT chest 7/21/2021: Negative for pulmonary embolism. GGO in the lingula. Cysts stable small loculated pneumatocele and new cystic bronchiectasis on the right.      CT chest 5/24/2021: No mediastinal adenopathy. Ill-defined areas of GGO in the upper lobes. Bibasilar scarring. No focal consolidation, pneumothorax or pleural effusion. Septated cyst superior segment of the right lower lobe and similar finding in the superior segment of the left lower lobe. No suspicious pulmonary nodule. CT chest 12/15/2018: Negative for pulmonary embolism. No mediastinal adenopathy. 8 x 14 mm groundglass nodule in the anterior right lower lobe. No other pulmonary nodule evident. No focal consolidation or pulmonary edema. No pleural effusion or pneumothorax. Few patchy thin-walled air cyst bilateral lungs     Sleep Study:  PSG 5/29/2021: Patient had 3 obstructive apneas and 2 hypopneas for an AHI for the entire night of 1.0. AHI was 1.2 during NREM sleep compared to an AHI of 0.0 during REM sleep. Minimum SPO2 was 75%. Patient spent a total of 285 minutes with an SPO2 of less than 89%. Patient had 45 leg movements during the night for a PLM index of 9.4 with 0 associated arousals. Laboratory Evaluation:           Review of Systems   Constitutional:         Decreased activity tolerance secondary to exertional dyspnea. HENT:          Denies sinus pain/pressure. Denies rhinorrhea. Occasional cough   Eyes: Negative. Respiratory:          Exertional dyspnea. Occasional productivity of pale light green secretions   Cardiovascular: Negative. Gastrointestinal: Negative. Endocrine: Negative. Genitourinary: Negative. Musculoskeletal: Negative. Skin: Negative. Allergic/Immunologic: Negative. Neurological: Negative. Hematological: Negative. Psychiatric/Behavioral: Negative. Prior to Visit Medications    Medication Sig Taking?  Authorizing Provider   omeprazole (PRILOSEC) 40 MG delayed release capsule TAKE ONE CAPSULE BY MOUTH TWICE A DAY IN THE MORNING AND AT BEDTIME Yes Vidya Colon MD   atorvastatin (LIPITOR) 20 MG tablet TAKE ONE TABLET BY MOUTH DAILY Yes LOI Orlando - CNP   carBAMazepine (TEGRETOL) 200 MG tablet Take 1 tablet by mouth 3 times daily Yes Ludivina Smith MD   topiramate (TOPAMAX) 50 MG tablet Take 0.5 tablets by mouth 2 times daily Yes LOI Crane - NP   rOPINIRole (REQUIP) 1 MG tablet Take 1 tablet by mouth nightly Yes Lurdeshine Buttdm, LOI - NP   sodium chloride 1 g tablet Take 1 tablet by mouth 3 times daily (with meals) Yes Gerard Meyers APRN - NP   nicotine (NICODERM CQ) 21 MG/24HR Place 1 patch onto the skin daily Yes LurdeshiLOI Evans - NP   lipase-protease-amylase (CREON) 30417-39653 units delayed release capsule TAKE ONE CAPSULE BY MOUTH THREE TIMES A DAY WITH A MEAL Yes Ludivina Smith MD   baclofen (LIORESAL) 10 MG tablet Take 1 tablet by mouth 3 times daily Yes Ludivina Smith MD   metoclopramide (REGLAN) 5 MG tablet TAKE ONE TABLET BY MOUTH THREE TIMES A DAY Yes Sara Guzman MD   fluticasone-umeclidin-vilant (TRELEGY ELLIPTA) 100-62.5-25 MCG/INH AEPB Inhale 1 puff into the lungs daily Yes LOI Johnson CNP   albuterol sulfate HFA (VENTOLIN HFA) 108 (90 Base) MCG/ACT inhaler Inhale 2 puffs into the lungs 4 times daily as needed for Wheezing Yes LOI Johnson CNP   amLODIPine (NORVASC) 10 MG tablet TAKE ONE TABLET BY MOUTH DAILY Yes Ludivina Smith MD   hydrOXYzine HCl (ATARAX) 50 MG tablet  Yes Laisha Newby MD   sucralfate (CARAFATE) 1 GM tablet Take 1 tablet by mouth 4 times daily Yes Sara Guzman MD   mirtazapine (REMERON) 15 MG tablet  Yes Elizabeth Dey   busPIRone (BUSPAR) 30 MG tablet Take 1 tablet by mouth in the morning and at bedtime Yes Elizabeth Dey   solifenacin (VESICARE) 10 MG tablet Take 1 tablet by mouth in the morning.  Yes MD Wicho Al MISC by Does not apply route Expires 5/2027 Yes Ludivina Smith MD   prazosin (MINIPRESS) 1 MG capsule Take 1 capsule by mouth nightly Yes LOI Barron NP   simethicone (MYLICON) 80 MG chewable tablet Take 1 tablet by mouth 4 times daily as needed for Flatulence Yes Bonifacio Thomas MD   vitamin D (ERGOCALCIFEROL) 77798 units CAPS capsule Take 1 capsule by mouth once a week Yes Angela Hahn MD   folic acid (FOLVITE) 1 MG tablet Take 1 tablet by mouth daily Yes Angela Hahn MD   pregabalin (LYRICA) 200 MG capsule Take 1 capsule by mouth nightly for 30 days. Avis Haddad, APRN - CNP       Social History     Tobacco Use    Smoking status: Every Day     Packs/day: 1.00     Years: 30.00     Pack years: 30.00     Types: Cigarettes    Smokeless tobacco: Never    Tobacco comments:     CURRENTLY SMOKES 0.5 PPD    Vaping Use    Vaping Use: Never used   Substance Use Topics    Alcohol use: Not Currently     Alcohol/week: 0.0 standard drinks    Drug use: Yes     Frequency: 2.0 times per week     Comment: recreation              RECORD REVIEW: Previous medical records were reviewed at today's visit. Wt Readings from Last 3 Encounters:   01/08/23 134 lb 0.6 oz (60.8 kg)   11/01/22 152 lb (68.9 kg)   09/01/22 146 lb 9.6 oz (66.5 kg)       Results for orders placed or performed during the hospital encounter of 01/01/23   Culture, Blood 1    Specimen: Blood   Result Value Ref Range    Specimen Description . BLOOD     Special Requests ac 10ml     Culture NO GROWTH 5 DAYS    Culture, Blood 1    Specimen: Blood   Result Value Ref Range    Specimen Description . BLOOD     Special Requests LAC 10ML     Culture NO GROWTH 5 DAYS    COVID-19, Rapid    Specimen: Nasopharyngeal Swab   Result Value Ref Range    Specimen Description . NASOPHARYNGEAL SWAB     SARS-CoV-2, Rapid Not Detected Not Detected   Culture, Urine    Specimen: Urine, straight catheter   Result Value Ref Range    Specimen Description . URINE,STRAIGHT CATHETER     Culture NO GROWTH    RAPID INFLUENZA A/B ANTIGENS    Specimen: Nasopharyngeal   Result Value Ref Range    Flu A Antigen NEGATIVE NEGATIVE    Flu B Antigen NEGATIVE NEGATIVE   Respiratory Panel, Molecular, with COVID-19 (Restricted: peds pts or suitable admitted adults)    Specimen: Nasopharyngeal Swab   Result Value Ref Range    Specimen Description . NASOPHARYNGEAL SWAB     Adenovirus PCR Not Detected Not Detected    Coronavirus 229E PCR Not Detected Not Detected    Coronavirus HKU1 PCR Not Detected Not Detected    Coronavirus NL63 PCR Not Detected Not Detected    Coronavirus OC43 PCR Not Detected Not Detected    SARS-CoV-2, PCR Not Detected Not Detected    Human Metapneumovirus PCR Not Detected Not Detected    Rhino/Enterovirus PCR Not Detected Not Detected    Influenza A by PCR Not Detected Not Detected    Influenza B by PCR Not Detected Not Detected    Parainfluenza 1 PCR Not Detected Not Detected    Parainfluenza 2 PCR Not Detected Not Detected    Parainfluenza 3 PCR Not Detected Not Detected    Parainfluenza 4 PCR Not Detected Not Detected    Resp Syncytial Virus PCR Not Detected Not Detected    Bordetella Parapertussis Not Detected Not Detected    B Pertussis by PCR Not Detected Not Detected    Chlamydia pneumoniae By PCR Not Detected Not Detected    Mycoplasma pneumo by PCR Not Detected Not Detected   MRSA DNA Probe, Nasal    Specimen: Nasal   Result Value Ref Range    Specimen Description . NASAL SWAB     MRSA, DNA, Nasal NEGATIVE NEGATIVE   Strep Pneumoniae Antigen    Specimen: Urine, clean catch   Result Value Ref Range    Source . URINE     Strep pneumo Ag NEGATIVE    Culture, Respiratory    Specimen: Endotracheal   Result Value Ref Range    Specimen Description . ENDOTRACHEAL     Direct Exam < 10 EPITHELIAL CELLS/LPF     Direct Exam >25 NEUTROPHILS/LPF     Direct Exam NO SIGNIFICANT PATHOGENS SEEN     Culture NORMAL RESPIRATORY SUSIE MODERATE GROWTH    Ammonia   Result Value Ref Range    Ammonia 69 (H) 11 - 51 umol/L   Comprehensive Metabolic Panel   Result Value Ref Range    Glucose 121 (H) 70 - 99 mg/dL    BUN 37 (H) 6 - 20 mg/dL    Creatinine 0.69 0.50 - 0.90 mg/dL    Est, Glom Filt Rate >60 >60 mL/min/1.73m2 Calcium 6.8 (L) 8.6 - 10.4 mg/dL    Sodium 136 135 - 144 mmol/L    Potassium 5.3 3.7 - 5.3 mmol/L    Chloride 102 98 - 107 mmol/L    CO2 18 (L) 20 - 31 mmol/L    Anion Gap 16 9 - 17 mmol/L    Alkaline Phosphatase 197 (H) 35 - 104 U/L    ALT 19 5 - 33 U/L    AST 67 (H) <32 U/L    Total Bilirubin 0.7 0.3 - 1.2 mg/dL    Total Protein 6.6 6.4 - 8.3 g/dL    Albumin 3.1 (L) 3.5 - 5.2 g/dL    Albumin/Globulin Ratio 0.9 (L) 1.0 - 2.5   CBC with Auto Differential   Result Value Ref Range    WBC 17.5 (H) 3.5 - 11.3 k/uL    RBC 3.86 (L) 3.95 - 5.11 m/uL    Hemoglobin 11.2 (L) 11.9 - 15.1 g/dL    Hematocrit 35.3 (L) 36.3 - 47.1 %    MCV 91.5 82.6 - 102.9 fL    MCH 29.0 25.2 - 33.5 pg    MCHC 31.7 28.4 - 34.8 g/dL    RDW 16.4 (H) 11.8 - 14.4 %    Platelets 392 477 - 073 k/uL    MPV 10.6 8.1 - 13.5 fL    NRBC Automated 0.1 (H) 0.0 per 100 WBC    Seg Neutrophils 82 (H) 36 - 65 %    Lymphocytes 12 (L) 24 - 43 %    Monocytes 5 3 - 12 %    Eosinophils % 0 (L) 1 - 4 %    Basophils 0 0 - 2 %    Immature Granulocytes 1 (H) 0 %    Segs Absolute 14.24 (H) 1.50 - 8.10 k/uL    Absolute Lymph # 2.14 1.10 - 3.70 k/uL    Absolute Mono # 0.95 0.10 - 1.20 k/uL    Absolute Eos # <0.03 0.00 - 0.44 k/uL    Basophils Absolute <0.03 0.00 - 0.20 k/uL    Absolute Immature Granulocyte 0.13 0.00 - 0.30 k/uL    RBC Morphology ANISOCYTOSIS PRESENT    Lactate, Sepsis   Result Value Ref Range    Lactic Acid, Sepsis, Whole Blood 3.0 (H) 0.5 - 1.9 mmol/L   Lactate, Sepsis   Result Value Ref Range    Lactic Acid, Sepsis, Whole Blood 1.6 0.5 - 1.9 mmol/L   Magnesium   Result Value Ref Range    Magnesium 2.2 1.6 - 2.6 mg/dL   Urinalysis with Microscopic   Result Value Ref Range    Color, UA DARK YELLOW Yellow    Turbidity UA Clear Clear    Glucose, Ur NEGATIVE NEGATIVE    Bilirubin Urine NEGATIVE  Verified by ictotest. (A) NEGATIVE    Ketones, Urine SMALL (A) NEGATIVE    Specific Gravity, UA 1.020 1.005 - 1.030    Urine Hgb NEGATIVE NEGATIVE    pH, UA 5.5 5.0 - 8.0    Protein, UA 1+ (A) NEGATIVE    Urobilinogen, Urine ELEVATED (A) Normal    Nitrite, Urine NEGATIVE NEGATIVE    Leukocyte Esterase, Urine SMALL (A) NEGATIVE    WBC, UA 2 TO 5 0 - 5 /HPF    RBC, UA 0 TO 2 0 - 2 /HPF    Casts UA None 0 - 2 /LPF    Epithelial Cells UA 0 TO 2 0 - 5 /HPF    Bacteria, UA MODERATE (A) None    Yeast, UA MANY (A) None   Troponin   Result Value Ref Range    Troponin, High Sensitivity 25 (H) 0 - 14 ng/L   Troponin   Result Value Ref Range    Troponin, High Sensitivity 26 (H) 0 - 14 ng/L   APTT   Result Value Ref Range    PTT 25.8 20.5 - 30.5 sec   Protime-INR   Result Value Ref Range    Protime 11.1 9.1 - 12.3 sec    INR 1.0    TSH with Reflex   Result Value Ref Range    TSH 0.56 0.30 - 5.00 uIU/mL   TOX SCR, BLD, ED   Result Value Ref Range    Acetaminophen Level <5 (L) 10 - 30 ug/mL    Ethanol <10 <10 mg/dL    Ethanol percent <7.561 <9.169 %    Salicylate Lvl <1 (L) 3 - 10 mg/dL    Toxic Tricyclic Sc,Blood NEGATIVE NEGATIVE   Urine Drug Screen   Result Value Ref Range    Amphetamine Screen, Ur NEGATIVE NEGATIVE    Barbiturate Screen, Ur NEGATIVE NEGATIVE    Benzodiazepine Screen, Urine NEGATIVE NEGATIVE    Cocaine Metabolite, Urine NEGATIVE NEGATIVE    Methadone Screen, Urine NEGATIVE NEGATIVE    Opiates, Urine NEGATIVE NEGATIVE    Phencyclidine, Urine NEGATIVE NEGATIVE    Cannabinoid Scrn, Ur POSITIVE (A) NEGATIVE    Oxycodone Screen, Ur NEGATIVE NEGATIVE    Fentanyl, Ur NEGATIVE NEGATIVE    Test Information       Assay provides medical screening only. The absence of expected drug(s) and/or metabolite(s) may indicate diluted or adulterated urine, limitations of testing or timing of collection.    Brain Natriuretic Peptide   Result Value Ref Range    Pro-BNP 16,515 (H) <300 pg/mL   ELECTROLYTES PLUS   Result Value Ref Range    POC Sodium 138 138 - 146 mmol/L    POC Potassium 4.8 (H) 3.5 - 4.5 mmol/L    POC Chloride 109 (H) 98 - 107 mmol/L    POC TCO2 20 (L) 22 - 30 mmol/L    Anion Gap 10 7 - 16 mmol/L   Hemoglobin and hematocrit, blood   Result Value Ref Range    POC Hemoglobin 11.5 (L) 12.0 - 16.0 g/dL    POC Hematocrit 34 (L) 36 - 46 %   CALCIUM, IONIC (POC)   Result Value Ref Range    POC Ionized Calcium 0.96 (L) 1.15 - 1.33 mmol/L   Calcium, Ionized   Result Value Ref Range    Calcium, Ionized 0.94 (L) 1.13 - 1.33 mmol/L   Calcium, Ionized   Result Value Ref Range    Calcium, Ionized 0.93 (L) 1.13 - 1.33 mmol/L   Ethanol   Result Value Ref Range    Ethanol <10 <10 mg/dL    Ethanol percent <0.010 <0.010 %   Basic Metabolic Panel   Result Value Ref Range    Glucose 89 70 - 99 mg/dL    BUN 38 (H) 6 - 20 mg/dL    Creatinine 0.54 0.50 - 0.90 mg/dL    Est, Glom Filt Rate >60 >60 mL/min/1.73m2    Calcium 7.5 (L) 8.6 - 10.4 mg/dL    Sodium 134 (L) 135 - 144 mmol/L    Potassium 4.5 3.7 - 5.3 mmol/L    Chloride 106 98 - 107 mmol/L    CO2 19 (L) 20 - 31 mmol/L    Anion Gap 9 9 - 17 mmol/L   Magnesium   Result Value Ref Range    Magnesium 2.9 (H) 1.6 - 2.6 mg/dL   TSH without Reflex   Result Value Ref Range    TSH 1.40 0.30 - 5.00 uIU/mL   Ethanol   Result Value Ref Range    Ethanol <10 <10 mg/dL    Ethanol percent <0.010 <0.010 %   MYCOPLASMA PNEUMONIAE ANTIBODY, IGM   Result Value Ref Range    Mycoplasma pneumo IgM 0.30 <0.91   CBC with Auto Differential   Result Value Ref Range    WBC 14.0 (H) 3.5 - 11.3 k/uL    RBC 3.67 (L) 3.95 - 5.11 m/uL    Hemoglobin 10.4 (L) 11.9 - 15.1 g/dL    Hematocrit 33.5 (L) 36.3 - 47.1 %    MCV 91.3 82.6 - 102.9 fL    MCH 28.3 25.2 - 33.5 pg    MCHC 31.0 28.4 - 34.8 g/dL    RDW 16.1 (H) 11.8 - 14.4 %    Platelets 411 532 - 479 k/uL    MPV 10.6 8.1 - 13.5 fL    NRBC Automated 0.0 0.0 per 100 WBC    Seg Neutrophils 69 (H) 36 - 65 %    Lymphocytes 25 24 - 43 %    Monocytes 5 3 - 12 %    Eosinophils % 0 (L) 1 - 4 %    Basophils 0 0 - 2 %    Immature Granulocytes 1 (H) 0 %    Segs Absolute 9.70 (H) 1.50 - 8.10 k/uL    Absolute Lymph # 3.53 1.10 - 3.70 k/uL    Absolute Mono # 0. 65 0.10 - 1.20 k/uL    Absolute Eos # <0.03 0.00 - 0.44 k/uL    Basophils Absolute 0.03 0.00 - 0.20 k/uL    Absolute Immature Granulocyte 0.12 0.00 - 0.30 k/uL    RBC Morphology ANISOCYTOSIS PRESENT    Hepatic Function Panel   Result Value Ref Range    Albumin 2.3 (L) 3.5 - 5.2 g/dL    Alkaline Phosphatase 168 (H) 35 - 104 U/L    ALT 23 5 - 33 U/L    AST 64 (H) <32 U/L    Total Bilirubin 0.6 0.3 - 1.2 mg/dL    Bilirubin, Direct 0.4 (H) <0.3 mg/dL    Bilirubin, Indirect 0.2 0.0 - 1.0 mg/dL    Total Protein 5.5 (L) 6.4 - 8.3 g/dL    Albumin/Globulin Ratio 0.7 (L) 1.0 - 2.5   Troponin   Result Value Ref Range    Troponin, High Sensitivity 16 (H) 0 - 14 ng/L   Troponin   Result Value Ref Range    Troponin, High Sensitivity 15 (H) 0 - 14 ng/L   Ammonia   Result Value Ref Range    Ammonia 47 11 - 51 umol/L   Basic Metabolic Panel   Result Value Ref Range    Glucose 110 (H) 70 - 99 mg/dL    BUN 27 (H) 6 - 20 mg/dL    Creatinine 0.42 (L) 0.50 - 0.90 mg/dL    Est, Glom Filt Rate >60 >60 mL/min/1.73m2    Calcium 7.0 (L) 8.6 - 10.4 mg/dL    Sodium 144 135 - 144 mmol/L    Potassium 4.2 3.7 - 5.3 mmol/L    Chloride 106 98 - 107 mmol/L    CO2 23 20 - 31 mmol/L    Anion Gap 15 9 - 17 mmol/L   Magnesium   Result Value Ref Range    Magnesium 2.0 1.6 - 2.6 mg/dL   CBC with Auto Differential   Result Value Ref Range    WBC 7.7 3.5 - 11.3 k/uL    RBC 3.99 3.95 - 5.11 m/uL    Hemoglobin 11.2 (L) 11.9 - 15.1 g/dL    Hematocrit 35.8 (L) 36.3 - 47.1 %    MCV 89.7 82.6 - 102.9 fL    MCH 28.1 25.2 - 33.5 pg    MCHC 31.3 28.4 - 34.8 g/dL    RDW 15.9 (H) 11.8 - 14.4 %    Platelets 188 620 - 413 k/uL    MPV 10.4 8.1 - 13.5 fL    NRBC Automated 0.0 0.0 per 100 WBC    RBC Morphology ANISOCYTOSIS PRESENT     Seg Neutrophils 72 (H) 36 - 65 %    Lymphocytes 21 (L) 24 - 43 %    Monocytes 6 3 - 12 %    Eosinophils % 0 (L) 1 - 4 %    Basophils 0 0 - 2 %    Immature Granulocytes 1 (H) 0 %    Segs Absolute 5.53 1.50 - 8.10 k/uL    Absolute Lymph # 1.64 1.10 - 3.70 k/uL    Absolute Mono # 0.45 0.10 - 1.20 k/uL    Absolute Eos # <0.03 0.00 - 0.44 k/uL    Basophils Absolute <0.03 0.00 - 0.20 k/uL    Absolute Immature Granulocyte 0.04 0.00 - 0.30 k/uL   Brain Natriuretic Peptide   Result Value Ref Range    Pro-BNP 3,543 (H) <300 pg/mL   Basic Metabolic Panel   Result Value Ref Range    Glucose 114 (H) 70 - 99 mg/dL    BUN 23 (H) 6 - 20 mg/dL    Creatinine 0.46 (L) 0.50 - 0.90 mg/dL    Est, Glom Filt Rate >60 >60 mL/min/1.73m2    Calcium 7.2 (L) 8.6 - 10.4 mg/dL    Sodium 139 135 - 144 mmol/L    Potassium 3.8 3.7 - 5.3 mmol/L    Chloride 101 98 - 107 mmol/L    CO2 28 20 - 31 mmol/L    Anion Gap 10 9 - 17 mmol/L   Magnesium   Result Value Ref Range    Magnesium 1.8 1.6 - 2.6 mg/dL   CBC with Auto Differential   Result Value Ref Range    WBC 6.3 3.5 - 11.3 k/uL    RBC 4.08 3.95 - 5.11 m/uL    Hemoglobin 11.8 (L) 11.9 - 15.1 g/dL    Hematocrit 36.1 (L) 36.3 - 47.1 %    MCV 88.5 82.6 - 102.9 fL    MCH 28.9 25.2 - 33.5 pg    MCHC 32.7 28.4 - 34.8 g/dL    RDW 15.5 (H) 11.8 - 14.4 %    Platelets 495 373 - 070 k/uL    MPV 10.3 8.1 - 13.5 fL    NRBC Automated 0.0 0.0 per 100 WBC    Seg Neutrophils 61 36 - 65 %    Lymphocytes 32 24 - 43 %    Monocytes 7 3 - 12 %    Eosinophils % 0 (L) 1 - 4 %    Basophils 0 0 - 2 %    Immature Granulocytes 0 0 %    Segs Absolute 3.84 1.50 - 8.10 k/uL    Absolute Lymph # 2.00 1. 10 - 3.70 k/uL    Absolute Mono # 0.46 0.10 - 1.20 k/uL    Absolute Eos # <0.03 0.00 - 0.44 k/uL    Basophils Absolute <0.03 0.00 - 0.20 k/uL    Absolute Immature Granulocyte <0.03 0.00 - 0.30 k/uL    RBC Morphology ANISOCYTOSIS PRESENT    SPECIMEN REJECTION   Result Value Ref Range    Specimen Source . BLOOD     Ordered Test BMP,MG     Reason for Rejection SPECIMEN GROSSLY HEMOLYZED    Basic Metabolic Panel   Result Value Ref Range    Glucose 116 (H) 70 - 99 mg/dL    BUN 23 (H) 6 - 20 mg/dL    Creatinine 0.33 (L) 0.50 - 0.90 mg/dL    Est, Glom Filt Rate >60 >60 mL/min/1.73m2    Calcium 7.4 (L) 8.6 - 10.4 mg/dL    Sodium 141 135 - 144 mmol/L    Potassium 3.5 (L) 3.7 - 5.3 mmol/L    Chloride 101 98 - 107 mmol/L    CO2 29 20 - 31 mmol/L    Anion Gap 11 9 - 17 mmol/L   Magnesium   Result Value Ref Range    Magnesium 1.7 1.6 - 2.6 mg/dL   Potassium   Result Value Ref Range    Potassium 3.9 3.7 - 5.3 mmol/L   Lipase   Result Value Ref Range    Lipase 67 (H) 13 - 60 U/L   Hepatic Function Panel   Result Value Ref Range    Albumin 3.1 (L) 3.5 - 5.2 g/dL    Alkaline Phosphatase 157 (H) 35 - 104 U/L    ALT 30 5 - 33 U/L    AST 33 (H) <32 U/L    Total Bilirubin 0.4 0.3 - 1.2 mg/dL    Bilirubin, Direct 0.2 <0.3 mg/dL    Bilirubin, Indirect 0.2 0.0 - 1.0 mg/dL    Total Protein 6.5 6.4 - 8.3 g/dL    Albumin/Globulin Ratio 0.9 (L) 1.0 - 2.5   Basic Metabolic Panel   Result Value Ref Range    Glucose 152 (H) 70 - 99 mg/dL    BUN 23 (H) 6 - 20 mg/dL    Creatinine 0.26 (L) 0.50 - 0.90 mg/dL    Est, Glom Filt Rate >60 >60 mL/min/1.73m2    Calcium 8.9 8.6 - 10.4 mg/dL    Sodium 140 135 - 144 mmol/L    Potassium 3.8 3.7 - 5.3 mmol/L    Chloride 102 98 - 107 mmol/L    CO2 28 20 - 31 mmol/L    Anion Gap 10 9 - 17 mmol/L   Magnesium   Result Value Ref Range    Magnesium 1.6 1.6 - 2.6 mg/dL   CBC with Auto Differential   Result Value Ref Range    WBC 9.8 3.5 - 11.3 k/uL    RBC 4.04 3.95 - 5.11 m/uL    Hemoglobin 11.5 (L) 11.9 - 15.1 g/dL    Hematocrit 36.0 (L) 36.3 - 47.1 %    MCV 89.1 82.6 - 102.9 fL    MCH 28.5 25.2 - 33.5 pg    MCHC 31.9 28.4 - 34.8 g/dL    RDW 15.4 (H) 11.8 - 14.4 %    Platelets 652 346 - 187 k/uL    MPV 10.5 8.1 - 13.5 fL    NRBC Automated 0.0 0.0 per 100 WBC    RBC Morphology ANISOCYTOSIS PRESENT     Seg Neutrophils 63 36 - 65 %    Lymphocytes 27 24 - 43 %    Monocytes 8 3 - 12 %    Eosinophils % 0 (L) 1 - 4 %    Basophils 0 0 - 2 %    Immature Granulocytes 1 (H) 0 %    Segs Absolute 6.20 1.50 - 8.10 k/uL    Absolute Lymph # 2.69 1.10 - 3.70 k/uL    Absolute Mono # 0.83 0.10 - 1.20 k/uL    Absolute Eos # 0.04 0.00 - 0.44 k/uL    Basophils Absolute <0.03 0.00 - 0.20 k/uL    Absolute Immature Granulocyte 0.05 0.00 - 0.30 k/uL   CBC with Auto Differential   Result Value Ref Range    WBC 12.1 (H) 3.5 - 11.3 k/uL    RBC 4.07 3.95 - 5.11 m/uL    Hemoglobin 11.7 (L) 11.9 - 15.1 g/dL    Hematocrit 36.8 36.3 - 47.1 %    MCV 90.4 82.6 - 102.9 fL    MCH 28.7 25.2 - 33.5 pg    MCHC 31.8 28.4 - 34.8 g/dL    RDW 15.5 (H) 11.8 - 14.4 %    Platelets 296 920 - 771 k/uL    MPV 10.6 8.1 - 13.5 fL    NRBC Automated 0.0 0.0 per 100 WBC    Seg Neutrophils 71 (H) 36 - 65 %    Lymphocytes 20 (L) 24 - 43 %    Monocytes 8 3 - 12 %    Eosinophils % 0 (L) 1 - 4 %    Basophils 0 0 - 2 %    Immature Granulocytes 1 (H) 0 %    Segs Absolute 8.64 (H) 1.50 - 8.10 k/uL    Absolute Lymph # 2.36 1.10 - 3.70 k/uL    Absolute Mono # 0.94 0.10 - 1.20 k/uL    Absolute Eos # <0.03 0.00 - 0.44 k/uL    Basophils Absolute 0.03 0.00 - 0.20 k/uL    Absolute Immature Granulocyte 0.10 0.00 - 0.30 k/uL    RBC Morphology ANISOCYTOSIS PRESENT    Basic Metabolic Panel   Result Value Ref Range    Glucose 173 (H) 70 - 99 mg/dL    BUN 22 (H) 6 - 20 mg/dL    Creatinine 0.29 (L) 0.50 - 0.90 mg/dL    Est, Glom Filt Rate >60 >60 mL/min/1.73m2    Calcium 9.1 8.6 - 10.4 mg/dL    Sodium 137 135 - 144 mmol/L    Potassium 3.7 3.7 - 5.3 mmol/L    Chloride 101 98 - 107 mmol/L    CO2 26 20 - 31 mmol/L    Anion Gap 10 9 - 17 mmol/L   CBC with Auto Differential   Result Value Ref Range    WBC 12.3 (H) 3.5 - 11.3 k/uL    RBC 3.72 (L) 3.95 - 5.11 m/uL    Hemoglobin 10.6 (L) 11.9 - 15.1 g/dL    Hematocrit 33.5 (L) 36.3 - 47.1 %    MCV 90.1 82.6 - 102.9 fL    MCH 28.5 25.2 - 33.5 pg    MCHC 31.6 28.4 - 34.8 g/dL    RDW 15.6 (H) 11.8 - 14.4 %    Platelets 496 (H) 165 - 453 k/uL    MPV 10.6 8.1 - 13.5 fL    NRBC Automated 0.0 0.0 per 100 WBC    Seg Neutrophils 56 36 - 65 %    Lymphocytes 32 24 - 43 %    Monocytes 10 3 - 12 %    Eosinophils % 1 1 - 4 % Basophils 0 0 - 2 %    Immature Granulocytes 1 (H) 0 %    Segs Absolute 6.99 1.50 - 8.10 k/uL    Absolute Lymph # 3.93 (H) 1.10 - 3.70 k/uL    Absolute Mono # 1.18 0.10 - 1.20 k/uL    Absolute Eos # 0.07 0.00 - 0.44 k/uL    Basophils Absolute 0.03 0.00 - 0.20 k/uL    Absolute Immature Granulocyte 0.13 0.00 - 0.30 k/uL    RBC Morphology ANISOCYTOSIS PRESENT    Basic Metabolic Panel   Result Value Ref Range    Glucose 149 (H) 70 - 99 mg/dL    BUN 15 6 - 20 mg/dL    Creatinine 0.40 (L) 0.50 - 0.90 mg/dL    Est, Glom Filt Rate >60 >60 mL/min/1.73m2    Calcium 8.4 (L) 8.6 - 10.4 mg/dL    Sodium 141 135 - 144 mmol/L    Potassium 3.7 3.7 - 5.3 mmol/L    Chloride 106 98 - 107 mmol/L    CO2 23 20 - 31 mmol/L    Anion Gap 12 9 - 17 mmol/L   Iron and TIBC   Result Value Ref Range    Iron 35 (L) 37 - 145 ug/dL    TIBC 242 (L) 250 - 450 ug/dL    Iron Saturation 14 (L) 20 - 55 %    UIBC 207 112 - 347 ug/dL   Ferritin   Result Value Ref Range    Ferritin 182 (H) 13 - 150 ng/mL   CBC with Auto Differential   Result Value Ref Range    WBC 12.9 (H) 3.5 - 11.3 k/uL    RBC 3.75 (L) 3.95 - 5.11 m/uL    Hemoglobin 10.7 (L) 11.9 - 15.1 g/dL    Hematocrit 34.6 (L) 36.3 - 47.1 %    MCV 92.3 82.6 - 102.9 fL    MCH 28.5 25.2 - 33.5 pg    MCHC 30.9 28.4 - 34.8 g/dL    RDW 15.9 (H) 11.8 - 14.4 %    Platelets 540 (H) 245 - 453 k/uL    MPV 10.6 8.1 - 13.5 fL    NRBC Automated 0.0 0.0 per 100 WBC    Seg Neutrophils 67 (H) 36 - 65 %    Lymphocytes 22 (L) 24 - 43 %    Monocytes 10 3 - 12 %    Eosinophils % 0 (L) 1 - 4 %    Basophils 0 0 - 2 %    Immature Granulocytes 1 (H) 0 %    Segs Absolute 8.66 (H) 1.50 - 8.10 k/uL    Absolute Lymph # 2.80 1.10 - 3.70 k/uL    Absolute Mono # 1.24 (H) 0.10 - 1.20 k/uL    Absolute Eos # <0.03 0.00 - 0.44 k/uL    Basophils Absolute <0.03 0.00 - 0.20 k/uL    Absolute Immature Granulocyte 0.13 0.00 - 0.30 k/uL    RBC Morphology ANISOCYTOSIS PRESENT    Basic Metabolic Panel   Result Value Ref Range    Glucose 147 (H) 70 - 99 mg/dL    BUN 10 6 - 20 mg/dL    Creatinine 0.39 (L) 0.50 - 0.90 mg/dL    Est, Glom Filt Rate >60 >60 mL/min/1.73m2    Calcium 8.4 (L) 8.6 - 10.4 mg/dL    Sodium 137 135 - 144 mmol/L    Potassium 3.5 (L) 3.7 - 5.3 mmol/L    Chloride 106 98 - 107 mmol/L    CO2 21 20 - 31 mmol/L    Anion Gap 10 9 - 17 mmol/L   Venous Blood Gas, POC   Result Value Ref Range    pH, Calvin 7.225 (L) 7.320 - 7.430    pCO2, Calvin 50.4 41.0 - 51.0 mm Hg    pO2, Calvin 33.8 30.0 - 50.0 mm Hg    HCO3, Venous 20.8 (L) 22.0 - 29.0 mmol/L    Negative Base Excess, Calvin 7 (H) 0.0 - 2.0    O2 Sat, Calvin 53 (L) 60.0 - 85.0 %   Creatinine W/GFR Point of Care   Result Value Ref Range    POC Creatinine 0.71 0.51 - 1.19 mg/dL    eGFR, POC >60 mL/min/1.73m2   POCT urea (BUN)   Result Value Ref Range    POC BUN 38 (H) 8 - 26 mg/dL   Lactic Acid, POC   Result Value Ref Range    POC Lactic Acid 1.83 (H) 0.56 - 1.39 mmol/L   POCT Glucose   Result Value Ref Range    POC Glucose 122 (H) 74 - 100 mg/dL   Venous Blood Gas, POC   Result Value Ref Range    pH, Calvin 7.232 (L) 7.320 - 7.430    pCO2, Calvin 41.3 41.0 - 51.0 mm Hg    pO2, Calvin 50.5 (H) 30.0 - 50.0 mm Hg    HCO3, Venous 17.4 (L) 22.0 - 29.0 mmol/L    Negative Base Excess, Calvin 10 (H) 0.0 - 2.0    O2 Sat, Calvin 78 60.0 - 85.0 %   Arterial Blood Gas, POC   Result Value Ref Range    POC pH 7.346 (L) 7.350 - 7.450    POC pCO2 29.5 (L) 35.0 - 48.0 mm Hg    POC PO2 82.3 (L) 83.0 - 108.0 mm Hg    POC HCO3 16.2 (L) 21.0 - 28.0 mmol/L    Negative Base Excess, Art 8 (H) 0.0 - 2.0    POC O2 SAT 96 94.0 - 98.0 %    O2 Device/Flow/% NRB     Manav Test POSITIVE     Sample Site Right Radial Artery     FIO2 100.0    Arterial Blood Gas, POC   Result Value Ref Range    POC pH 7.327 (L) 7.350 - 7.450    POC pCO2 60.8 (H) 35.0 - 48.0 mm Hg    POC PO2 157.0 (H) 83.0 - 108.0 mm Hg    POC HCO3 31.8 (H) 21.0 - 28.0 mmol/L    Positive Base Excess, Art 4 (H) 0.0 - 3.0    POC O2 SAT 99 (H) 94.0 - 98.0 %    Manav Test POSITIVE     Sample Site Left Radial Artery     FIO2 100.0    Lactic Acid, POC   Result Value Ref Range    POC Lactic Acid 0.86 0.56 - 1.39 mmol/L   POCT Glucose   Result Value Ref Range    POC Glucose 121 (H) 74 - 100 mg/dL   Arterial Blood Gas, POC   Result Value Ref Range    POC pH 7.369 7.350 - 7.450    POC pCO2 53.8 (H) 35.0 - 48.0 mm Hg    POC PO2 93.1 83.0 - 108.0 mm Hg    POC HCO3 31.0 (H) 21.0 - 28.0 mmol/L    Positive Base Excess, Art 4 (H) 0.0 - 3.0    POC O2 SAT 97 94.0 - 98.0 %    O2 Device/Flow/% Adult Ventilator     Manav Test POSITIVE     Sample Site Left Radial Artery     FIO2 80.0    POCT Glucose   Result Value Ref Range    POC Glucose 138 (H) 74 - 100 mg/dL   POC Glucose Fingerstick   Result Value Ref Range    POC Glucose 131 (H) 65 - 105 mg/dL   Arterial Blood Gas, POC   Result Value Ref Range    POC pH 7.410 7.350 - 7.450    POC pCO2 48.6 (H) 35.0 - 48.0 mm Hg    POC PO2 99.5 83.0 - 108.0 mm Hg    POC HCO3 30.8 (H) 21.0 - 28.0 mmol/L    Positive Base Excess, Art 5 (H) 0.0 - 3.0    POC O2 SAT 98 94.0 - 98.0 %    O2 Device/Flow/% Adult Ventilator     Manav Test POSITIVE     Sample Site Left Radial Artery     FIO2 50.0    POCT Glucose   Result Value Ref Range    POC Glucose 159 (H) 74 - 100 mg/dL   Arterial Blood Gas, POC   Result Value Ref Range    POC pH 7.401 7.350 - 7.450    POC pCO2 45.4 35.0 - 48.0 mm Hg    POC PO2 103.9 83.0 - 108.0 mm Hg    POC HCO3 28.2 (H) 21.0 - 28.0 mmol/L    Positive Base Excess, Art 3 0.0 - 3.0    POC O2 SAT 98 94.0 - 98.0 %    Manav Test POSITIVE     Sample Site Right Radial Artery     FIO2 40.0    POCT Glucose   Result Value Ref Range    POC Glucose 186 (H) 74 - 100 mg/dL   POC Glucose Fingerstick   Result Value Ref Range    POC Glucose 162 (H) 65 - 105 mg/dL   EKG 12 Lead   Result Value Ref Range    Ventricular Rate 80 BPM    Atrial Rate 80 BPM    P-R Interval 216 ms    QRS Duration 104 ms    Q-T Interval 450 ms    QTc Calculation (Bazett) 519 ms    P Axis 58 degrees    R Axis 63 degrees    T Axis 88 degrees   EKG 12 Lead   Result Value Ref Range    Ventricular Rate 60 BPM    Atrial Rate 60 BPM    P-R Interval 238 ms    QRS Duration 100 ms    Q-T Interval 516 ms    QTc Calculation (Bazett) 516 ms    P Axis 53 degrees    R Axis 53 degrees    T Axis 88 degrees   EKG 12 Lead   Result Value Ref Range    Ventricular Rate 61 BPM    Atrial Rate 61 BPM    P-R Interval 228 ms    QRS Duration 102 ms    Q-T Interval 504 ms    QTc Calculation (Bazett) 507 ms    P Axis 47 degrees    R Axis 37 degrees    T Axis 35 degrees   EKG 12 Lead   Result Value Ref Range    Ventricular Rate 87 BPM    Atrial Rate 87 BPM    P-R Interval 214 ms    QRS Duration 108 ms    Q-T Interval 394 ms    QTc Calculation (Bazett) 474 ms    P Axis 57 degrees    R Axis 65 degrees    T Axis 74 degrees   ECHO Complete 2D W Doppler W Color   Result Value Ref Range    Left Ventricular Ejection Fraction 65     LVEF MODALITY ECHO        No results found. PHYSICAL EXAMINATION:  Due to this being a TeleHealth encounter, evaluation of the following organ systems is limited: Vitals/Constitutional/EENT/Resp/CV/GI//MS/Neuro/Skin/Heme-Lymph-Imm. Constitutional: [x] Appears well-developed and well-nourished. [] Abnormal  Mental status  [x] Alert and awake  [x] Oriented to person/place/time [x]Able to follow commands    [x] No apparent distress      Eyes:  EOM    [x]  Normal  [] Abnormal-  Sclera  [x]  Normal  [] Abnormal -         Discharge [x]  None visible  [] Abnormal -    HENT:   [x] Normocephalic, atraumatic. [] Abnormal shaped head   [x] Mouth/Throat: Mucous membranes are moist.     Ears [x] Normal  [] Abnormal-    Neck: [x] Normal range of motion [x] Supple [x] No visualized mass. Pulmonary/Chest: [x] Respiratory effort normal.  [x] No visualized signs of difficulty breathing or respiratory distress        [] Abnormal      Musculoskeletal:   [x] Normal range of motion. [x] Normal gait with no signs of ataxia.          [x]  No signs of cyanosis of the peripheral portions of extremities. [] Abnormal       Neurological:        [x] Normal cranial nerve (limited exam to video visit) [x] No focal weakness observed       [] Abnormal          Speech       [x] Normal   [] Abnormal     Skin:        [x] No rash on visible skin  [x] Normal  [] Abnormal     Psychiatric:       [x] Normal  [] Abnormal        [x] Normal Mood  [] Anxious appearing      Other pertinent exam findings:-        ASSESSMENT:  1. Chronic respiratory failure with hypoxia and hypercapnia (HCC)    2. Chronic obstructive pulmonary disease with acute lower respiratory infection ([facility] [address]    3. Chronic respiratory failure, unspecified whether with hypoxia or hypercapnia ([facility] [address]    4. Restless leg          Plan:   Medications reviewed, continue Trelegy Ellipta and albuterol HFA  Educated and clarified the medication use. Maintain an active lifestyle. Patient's questions were answered to her satisfaction. Supplemental oxygen continues at 3 L/min around-the-clock, periodically during the day not consistent. Smoking cessation was recommended/continued  Pulmonary function tests were reviewed   CT scan of the chest was reviewed  We'll see the patient back in 4 months         An  electronic signature was used to authenticate this note. --LOI Hayward - CNP on 2/7/2023 at 11:20 AM      Pursuant to the emergency declaration under the [facility], [facility] waiver authority and the [employer] Act, this Virtual  Visit was conducted, with patient's consent, to reduce the patient's risk of exposure to COVID-19 and provide continuity of care for an established patient.     Services were provided through a video synchronous discussion virtually to substitute for in-person clinic visit.     ________________________________________________________________

## 2023-02-10 ENCOUNTER — HOSPITAL ENCOUNTER (OUTPATIENT)
Dept: PSYCHIATRY | Age: 53
Setting detail: THERAPIES SERIES
Discharge: HOME | End: 2023-02-10

## 2023-02-10 NOTE — PROGRESS NOTES
[facility] Therapy Note in [facility], [address]   2/10/2023  11:00 AM  Nesha Dionisio  1970  [id number]    Time spent with Patient: *** minutes      Pt was provided informed consent for the [address]. Discussed with patient model of service to include the limits of confidentiality (i.e. abuse reporting, suicide intervention, etc.) and short-term intervention focused approach. Pt indicated understanding.       S:          O:  MSE:     Appearance    {GENERAL APPEARANCE:74410}  Appetite {Desc; normal/:25812}  Sleep disturbance {YES / NO:19727}  Fatigue {YES / NO:19727}  Loss of pleasure {YES / RO:86901}  Impulsive behavior {YES / PS:35667}  Speech    {RPLODE:165372970}  Mood    {[facility] AFFECT/MOOD:4400344159}  Affect    {EXAM; PSYCH ONEIMU:40015}  Thought Content    {EXAM; PSYCH THOUGHT CONTENT FA:12036}  Thought Process    {THOUGHT PROCESS:912173772}  Associations    {Findings; psych thought process exam:06738::\"logical connections\"}  Insight    {Assessment:2184629399}  Judgment    {FINDINGS; OT UE INTACT/IMPAIRED:9904426805}  Orientation    {Exam; orientation:82277}  Memory    {EXAM; NEURO PED YJQYZV:32201}  Attention/Concentration    {Desc; intact/impaired:00065}  Morbid ideation {YES / CZ:61682}  Suicide Assessment    {SUICIDE:631612780}    A:          Visit Diagnosis:         History from Medical Record:        Diagnosis Date    Acute respiratory failure with hypoxia (HCC) 10/16/2020    Alcohol withdrawal syndrome, with delirium ([facility] [address] 12/14/2019    Alcoholism ([facility] [address]     Anal warts     Anemia 10/2020    GI bleed    Anxiety     Astrocytoma ([facility] [address] - diagnosed at age 24, the patient underwent 2 surgical resections without known recurrence 10/23/2020    at age 24    Bandemia     Closed fracture of lateral portion of left tibial plateau 16/95/8779    Condyloma     COPD (chronic obstructive pulmonary disease) ([facility])     CO2 retainer, on Bipap at night for this, Dr. Estella Alonso ( last visit 11/20/2020 and note on chart )    Depression      major depressive disorder, ptsd, anxiety    Dysphagia     GI bleed 10/2020    Hypertension     Memory loss     Oxygen dependent     USES  3L/MIN DAILY PRN AND NIGHTLY CONTINUOUSLY    Pain, joint, ankle and foot     Pancreatic lesion 10/2020    Dr. [facility] [employer] working up pt    Perianal wart     Peripheral neuropathy     Seizures ([facility] [address]     LAST SEIZURE 3/2020 - FEELS IT WAS FROM ALCOHOL WITHDRAWL    Tension headache     Under care of team 09/29/2021    [facility] 2021    Under care of team 09/29/2021    pain management-Hamzah Martines-[facility] jim[facility]-last visit sep 2021    Under care of team     pulmonology-Dr Dueñas-[facility]-due for visit March 2022    Under care of team 09/29/2021    psych-bahnfeldt NP-telemed-last visit 10/ 2021    Under care of team 09/29/2021    vy-[address]-last visit aug 2021    Under care of team     NEPHROLOGY - DR. LEE    Wears glasses     Wears partial dentures     UPPER    Wellness examination     [facility] Jan 5, 2022     Medications:   Current Outpatient Medications   Medication Sig Dispense Refill    omeprazole (PRILOSEC) 40 MG delayed release capsule TAKE ONE CAPSULE BY MOUTH TWICE A DAY IN THE MORNING AND AT BEDTIME 60 capsule 2    atorvastatin (LIPITOR) 20 MG tablet TAKE ONE TABLET BY MOUTH DAILY 30 tablet 3    carBAMazepine (TEGRETOL) 200 MG tablet Take 1 tablet by mouth 3 times daily 90 tablet 5    topiramate (TOPAMAX) 50 MG tablet Take 0.5 tablets by mouth 2 times daily 60 tablet 3    rOPINIRole (REQUIP) 1 MG tablet Take 1 tablet by mouth nightly 90 tablet 3    sodium chloride 1 g tablet Take 1 tablet by mouth 3 times daily (with meals) 90 tablet 3    nicotine (NICODERM CQ) 21 MG/24HR Place 1 patch onto the skin daily 30 patch 3    lipase-protease-amylase (CREON) 26429-62987 units delayed release capsule TAKE ONE CAPSULE BY MOUTH THREE TIMES A DAY WITH A MEAL 90 capsule 2    baclofen (LIORESAL) 10 MG tablet Take 1 tablet by mouth 3 times daily 60 tablet 1    metoclopramide (REGLAN) 5 MG tablet TAKE ONE TABLET BY MOUTH THREE TIMES A DAY 90 tablet 3    fluticasone-umeclidin-vilant (TRELEGY ELLIPTA) 100-62.5-25 MCG/INH AEPB Inhale 1 puff into the lungs daily 3 each 3    pregabalin (LYRICA) 200 MG capsule Take 1 capsule by mouth nightly for 30 days. 90 capsule 3    albuterol sulfate HFA (VENTOLIN HFA) 108 (90 Base) MCG/ACT inhaler Inhale 2 puffs into the lungs 4 times daily as needed for Wheezing 3 each 3    amLODIPine (NORVASC) 10 MG tablet TAKE ONE TABLET BY MOUTH DAILY 90 tablet 1    hydrOXYzine HCl (ATARAX) 50 MG tablet       sucralfate (CARAFATE) 1 GM tablet Take 1 tablet by mouth 4 times daily 120 tablet 3    mirtazapine (REMERON) 15 MG tablet       busPIRone (BUSPAR) 30 MG tablet Take 1 tablet by mouth in the morning and at bedtime      solifenacin (VESICARE) 10 MG tablet Take 1 tablet by mouth in the morning. Handicap Placard MISC by Does not apply route Expires 5/2027 1 each 0    prazosin (MINIPRESS) 1 MG capsule Take 1 capsule by mouth nightly 30 capsule 3    simethicone (MYLICON) 80 MG chewable tablet Take 1 tablet by mouth 4 times daily as needed for Flatulence 180 tablet 3    vitamin D (ERGOCALCIFEROL) 88623 units CAPS capsule Take 1 capsule by mouth once a week 12 capsule 1    folic acid (FOLVITE) 1 MG tablet Take 1 tablet by mouth daily 90 tablet 1     No current facility-administered medications for this encounter.        Social History:   Social History     Socioeconomic History    Marital status: Single     Spouse name: Not on file    Number of children: Not on file    Years of education: Not on file    Highest education level: Not on file   Occupational History    Not on file   Tobacco Use    Smoking status: Every Day     Packs/day: 1.00     Years: 30.00     Pack years: 30.00     Types: Cigarettes    Smokeless tobacco: Never    Tobacco comments:     CURRENTLY SMOKES 0.5 PPD    Vaping Use    Vaping Use: Never used   Substance and Sexual Activity    Alcohol use: Not Currently     Alcohol/week: 0.0 standard drinks    Drug use: Yes     Frequency: 2.0 times per week     Comment: recreation    Sexual activity: Not Currently   Other Topics Concern    Not on file   Social History Narrative    Not on file     Social Determinants of Health     Financial Resource Strain: Low Risk     Difficulty of Paying Living Expenses: Not very hard   Food Insecurity: No Food Insecurity    Worried About Running Out of Food in the Last Year: Never true    Ran Out of Food in the Last Year: Never true   Transportation Needs: Not on file   Physical Activity: Not on file   Stress: Not on file   Social Connections: Not on file   Intimate Partner Violence: Not on file   Housing Stability: Not on file       TOBACCO:   reports that she has been smoking cigarettes. She has a 30.00 pack-year smoking history. She has never used smokeless tobacco.  ETOH:   reports that she does not currently use alcohol. Family History:   Family History   Problem Relation Age of Onset    Other Father     Cancer Maternal Grandmother     Heart Disease Paternal Grandmother     Esophageal Cancer Maternal Aunt     Arthritis Mother            Pt interventions:  {Mental health remedies:70178}        PLAN:         Patient scheduled to return on:       Were changes or additions made to the treatment plan today?   YES []   NO []  Noted changes:

## 2023-02-17 ENCOUNTER — CLINICAL DOCUMENTATION (OUTPATIENT)
Dept: PSYCHIATRY | Age: 53
End: 2023-02-17

## 2023-02-17 NOTE — PROGRESS NOTES
[address] Note   MARVIN Rojas Chi   2/17/2023  11:29 AM  Nomi Fulton  1970  [id number]    Pt was a no show to her 11 AM appt. Pt contacted therapist to report she had overslept.

## 2023-02-24 ENCOUNTER — HOSPITAL ENCOUNTER (OUTPATIENT)
Dept: PSYCHIATRY | Age: 53
Setting detail: THERAPIES SERIES
Discharge: HOME | End: 2023-02-24

## 2023-02-24 NOTE — PROGRESS NOTES
All items selected indicate a positive finding. Those items not selected are negative.   Constitutional [x] Weight loss/gain   [x] Fatigue  [] Fever/Chills   HEENT [] Hearing Loss  [] Visual Disturbance  [] Tinnitus  [] Eye pain   Respiratory [] Shortness of Breath  [] Cough  [] Snoring   Cardiovascular [] Chest Pain  [] Palpitations  [] Lightheaded   GI [] Constipation  [] Diarrhea  [] Swallowing change  [] Nausea/vomiting    [] Urinary Frequency  [] Urinary Urgency   Musculoskeletal [] Neck pain  [x] Back pain  [] Muscle pain  [] Restless legs   Dermatologic [] Skin changes   Neurologic [] Memory loss/confusion  [] Seizures  [] Trouble walking or imbalance  [] Dizziness  [] Sleep disturbance  [x] Weakness  [x] Numbness  [] Tremors  [] Speech Difficulty  [] Headaches  [] Light Sensitivity  [] Sound Sensitivity   Endocrinology []Excessive thirst  []Excessive hunger   Psychiatric [x] Anxiety/Depression  [] Hallucination   Allergy/immunology []Hives/environmental allergies   Hematologic/lymph [] Abnormal bleeding  [] Abnormal bruising Physical Therapy    Visit Type: treatment  Precautions:  Medical precautions:  fall risk; contact & special precautions.  2.15: Pt was admitted from home to [facility] with SOB, AMS and found to be +COVID.  PT/OT eval and treat.    PMH: breast CA, prostate CA, pacemaker, bipolar  Lines:     Basic: telemetry, external urinary catheter and capped IV      Lines in chart and on patient reviewed, precautions maintained throughout session.  Safety Measures: bed alarm and bed rails  SUBJECTIVE  Patient agreed to participate in therapy this date.  Patient verbally agrees to allow the following to be present during session: son  Patient / Family Goal: return home    Pain     At onset of session (out of 10): 0  RN informed on pain level     OBJECTIVE     Cognitive Status   Level of Consciousness   - drowsy  Orientation    - Oriented to: person and place   - Disoriented to: time and situation  Functional Communication   - Overall Status: impaired   - Forms of Communication: verbal  Attention Span    - Attention: - attends with cues to redirect  Following Direction   - follows one step commands with increased time and follows one step commands with repetition  Memory   - - decreased recall of recent events - decreased short term memory  Awareness of Deficits   - assistance required to compensate for deficits      Sitting Balance  (OBINNA = base of support)  Static      - Trial 1 (sec): 300     - Trial 1 details: stand by assist, with back unsupported, without UE support and with double LE support  Required CGA at times to maintain sitting balance.    Standing Balance  (OBINNA = base of support)  Firm Surface: Double Leg      - Static, Eyes Open       - Trial 1 (sec): 25       - Trial 1 details: moderate assist and with double UE support       Bed Mobility  - Supine to sit: minimal assist, with tactile cues, with verbal cues  - Sit to supine: with tactile cues, with verbal cues, moderate assist  Transfers  Assistive devices: gait belt  -  Sit to stand: total assist - non-dependent, with verbal cues, with tactile cues (mod x2)  - Stand to sit: total assist - non-dependent, with verbal cues, with tactile cues (mod x2)    Ambulation / Gait  Pt was unable to ambulate at this time due to poor standing balance and tolerance.     Interventions     Training provided: activity tolerance, balance retraining, bed mobility training, cognitive training, positioning, safety training and transfer training  Pt required VCs to stay on task throughout session.  Skilled input: Verbal instruction/cues and tactile instruction/cues  Verbal Consent: Writer verbally educated and received verbal consent for hand placement, positioning of patient, and techniques to be performed today from patient for clothing adjustments for techniques and therapist position for techniques as described above and how they are pertinent to the patient's plan of care.         ASSESSMENT   Impairments: activity tolerance, balance, endurance, safety awareness, strength, decreased insight into deficits, cognition and cardiovascular endurance  Functional Limitations: all functional mobility       Discharge Recommendations  Recommendation for Discharge: PT IL: Patient is appropriate for daily Physical Therapy  PT/OT Mobility Equipment for Discharge: TBD   Therapy Diagnosis:  Other Abnormalities of Gait and Mobility     Progress: progressing toward goals    • Skilled therapy is required to address these limitations in attempt to maximize the patient's independence.    Education:   - Present and not ready to learn: patient  Education provided during session:  - Results of above outlined education: Needs reinforcement    Patient at End of Session:   Location: in bed  Safety measures: alarm system in place/re-engaged, call light within reach, equipment intact and bed rails x4  Handoff to: nurse    PLAN   Suggestions for next session as indicated: PT Frequency: 3-5 x per week  Frequency Comments: 1/3-5 Th  ECF 2.24     A minimum of 8 minutes per session x 1 week in the acute setting.   Interventions: balance, bed mobility, body mechanics, compensatory technique education, endurance training, equipment eval/education, functional transfer training, gait training, strengthening and safety education Pt continues to be limited by decreased cognition, balance, and weakness.  He required a lot of encouragement to participate and cues to stay on task.  Agreement to plan and goals: patient agrees with goals and treatment plan        GOALS  Review Date: 2/24/2023  Long Term Goals: (to be met by time of discharge from hospital)  Sit to supine: Patient will complete sit to supine minimal assist and with moderate cues.  Status: progressing/ongoing  Supine to sit: Patient will complete supine to sit with moderate cues and minimal assist.  Status: met   Sit (edge of bed): Patient will sit at edge of bed for 10, supervision.   Status: progressing/ongoing  Sit to stand: Patient will complete sit to stand transfer with 2-wheeled walker, minimal assist.   Status: progressing/ongoing  Stand to sit: Patient will complete stand to sit transfer with 2-wheeled walker, minimal assist.   Status: progressing/ongoing  Ambulation (even): Patient will ambulate on even surface for 15 feet with 2-wheeled walker, minimal assist.   Status: progressing/ongoing    Documented in the chart in the following areas: Assessment.      Therapy procedure time and total treatment time can be found documented on the Time Entry flowsheet
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
## 2023-02-28 ENCOUNTER — OFFICE VISIT (OUTPATIENT)
Dept: NEUROLOGY | Age: 53
End: 2023-02-28
Payer: MEDICAID

## 2023-02-28 VITALS
DIASTOLIC BLOOD PRESSURE: 63 MMHG | SYSTOLIC BLOOD PRESSURE: 104 MMHG | HEIGHT: 65 IN | HEART RATE: 105 BPM | WEIGHT: 136 LBS | BODY MASS INDEX: 22.66 KG/M2

## 2023-02-28 DIAGNOSIS — G62.1 ALCOHOLIC PERIPHERAL NEUROPATHY (HCC): ICD-10-CM

## 2023-02-28 DIAGNOSIS — G43.019 INTRACTABLE MIGRAINE WITHOUT AURA AND WITHOUT STATUS MIGRAINOSUS: ICD-10-CM

## 2023-02-28 DIAGNOSIS — G40.909 SEIZURE DISORDER (HCC): ICD-10-CM

## 2023-02-28 DIAGNOSIS — C71.9 ASTROCYTOMA (HCC): Primary | ICD-10-CM

## 2023-02-28 PROCEDURE — 99214 OFFICE O/P EST MOD 30 MIN: CPT | Performed by: NURSE PRACTITIONER

## 2023-02-28 PROCEDURE — 3078F DIAST BP <80 MM HG: CPT | Performed by: NURSE PRACTITIONER

## 2023-02-28 PROCEDURE — 3074F SYST BP LT 130 MM HG: CPT | Performed by: NURSE PRACTITIONER

## 2023-02-28 RX ORDER — ARIPIPRAZOLE 5 MG/1
5 TABLET ORAL DAILY
COMMUNITY
Start: 2023-01-30

## 2023-02-28 NOTE — PROGRESS NOTES
[facility] [place]            [address], [address]          [place], [place] [address]          Dept: [phone]          Dept Fax: [phone]    MD Chidi Nolasco MD Sarah Mufti, MD Cheryl McGarry, CNP            2/28/2023      HISTORY OF PRESENT ILLNESS:       I had the pleasure of seeing Barbara White, who returns for continuing neurologic care.  The patient was seen last on August 29, 2022 for treatment of migraine headaches, seizure disorder, peripheral neuropathy, cognitive impairment L4-5 radiculopathy and a history of an astrocytoma status post two resections in 1989.    It is a 52-year-old woman with intractable chronic migraine headaches.  She has been doing much better, however was recently hospitalized and several of her medications have changed.  For management of her migraines, she is currently only taking 25 mg of Topamax twice daily.  She has also been taking Lyrica but that is used for her neuropathy.  Headache location: holoacranial   Headache quality: pressure  Associated factors:  Nausea, vomiting, photophobia, phonophobia  Intensity: 8/10  Headache chronicity: 2 years  Headache frequency: 1-2 headaches/month   Aggravating factors : bright lights, loud sounds, weather changes  Relieving factors: none currently   Prophylactic medications:  Lyrica, topamax   Abortive medications: maxalt  Previously used medications: gabapentin, lexapro, magnesium, prozac    She also has a seizure disorder with her last seizure occurring in March 2020. For seizure prophylaxis she was prescribed tegretol 200 mg three times daily, lyrica 200 mg three times daily and topamax 50 mg twice daily. Carbamazepine level and liver function studies were normal in September 2022. She was hospitalized in January 2023 with abnormal movements concerning for breakthrough seizures. She notes that her seizures can be associated with numbness of her face. She  describes an event where she was awake and developed severe tremor of her bilateral upper extremities which was reported to be secondary to a panic attack. She has continued having consistent numbness of her face which can be associated with paresthesias. She has a peripheral neuropathy likely secondary to a history of alcohol abuse and was prescribed lyrica 200 mg three times daily. She reports today that she has weaned her lyrica dosage to 200 mg nightly. She also has a cognitive impairment secondary to her history of alcohol abuse. For management of her L4-5 radiculopathy she was prescribed lyrica 200 mg three times daily. She also has a history of an astrocytoma status post two resection in 1989. Testing reviewed:    Labs Completed 9/1/2022  ALT 5 - 33 U/L 8          AST <32 U/L 17            Carbamazepine Lvl 4.0 - 12.0 ug/mL 4.1      MRI Brain W WO Contrast 12/31/2021  Impression   No acute intracranial abnormality. No abnormal postcontrast enhancement. MRI Lumbar Spine WO Contrast 7/23/2021  Impression   1. Acute compression deformity of the superior endplate of L5 with   approximately 35% loss of height. No significant bulging of the posterior   cortex. 2. No significant spinal canal stenosis of the lumbar spine. 3. Neural foraminal narrowing at L3-L4 through L5-S1.   4. Minimal retrolisthesis at L5-S1. EEG 7/29/21:  abnormal EEG secondary to generalized slowing without focal or  paroxysmal abnormality. Carbamazepine (7/25/2021): Total level 7.7 (4-12). ,  Free level: 2.3      PAST MEDICAL HISTORY:         Diagnosis Date    Acute respiratory failure with hypoxia ([address] 10/16/2020    Alcohol withdrawal syndrome, with delirium ([address] 12/14/2019    Alcoholism ([address]     Anal warts     Anemia 10/2020    GI bleed    Anxiety     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24, the patient underwent 2 surgical resections without known recurrence 10/23/2020    at age 24 Bandemia     Closed fracture of lateral portion of left tibial plateau 77/57/2025    Condyloma     COPD (chronic obstructive pulmonary disease) ([facility])     CO2 retainer, on Bipap at night for this, Dr. Shalonda Boyd ( last visit 11/20/2020 and note on chart )    Depression      major depressive disorder, ptsd, anxiety    Dysphagia     GI bleed 10/2020    Hypertension     Memory loss     Oxygen dependent     USES  3L/MIN DAILY PRN AND NIGHTLY CONTINUOUSLY    Pain, joint, ankle and foot     Pancreatic lesion 10/2020    Dr. Henok Jacob working up pt    Perianal wart     Peripheral neuropathy     Seizures ([facility]     LAST SEIZURE 3/2020 - FEELS IT WAS FROM ALCOHOL WITHDRAWL    Tension headache     Under care of team 09/29/2021    [facility]    Under care of team 09/29/2021    pain management-Hazmah Martines-nery jimenez-last visit sep 2021    Under care of team     pulmonology-Dr Dueñas-[facility]-due for visit March 2022    Under care of team 09/29/2021    psych-bahnfeldt NP-telemed-last visit 10/ 2021    Under care of team 09/29/2021    up-[address]-last visit aug 2021    Under care of team     NEPHROLOGY - DR. LEE    Wears glasses     Wears partial dentures     UPPER    Wellness examination     [facility], 2022        PAST SURGICAL HISTORY:         Procedure Laterality Date    ANUS SURGERY N/A 01/25/2022    EXCISION AND FULGURATION, ANAL, VAGINAL AND PERIANAL WARTS WITH CO2 LASER, FORTEC performed by [place], MD at [facility] 2    COLONOSCOPY N/A 10/22/2020    COLONOSCOPY DIAGNOSTIC performed by Marisa Torres MD at [facility]    COLONOSCOPY N/A 11/19/2021    COLONOSCOPY W/ ENDOSCOPIC MUCOSAL RESECTION performed by Marisa Torres MD at [address] [place] [address]  07/28/2021    CT ABSCESS DRAIN SUBCUTANEOUS 7/28/2021 [facility] CT SCAN    ENDOSCOPIC ULTRASOUND (LOWER) N/A 12/09/2020    ENDOSCOPIC ULTRASOUND, UPPER WITH LINEAR SCOPE FOR BIOPSY OF MASS ON HEAD OF PANCREAS performed by Laurita Berrios MD at [facility] ([facility])  02/09/2022    ENDOSCOPIC ULTRASOUND, EGD - GI SCHEDULED,EGD STENT REMOVAL    ERCP N/A 08/11/2021    ERCP STENT INSERTION performed by Vikash Sandoval MD at [facility]    ERCP  08/11/2021    ERCP SPHINCTER/PAPILLOTOMY performed by Vikash Sandoval MD at [facility]    ERCP N/A 10/21/2021    ERCP STENT REMOVAL/EXCHANGE performed by Vikash Sandoval MD at [place] [address]    ERCP  10/21/2021    ERCP STONE REMOVAL performed by Vikash Sandoval MD at [place] [address]    ERCP  10/21/2021    ERCP ENDOSCOPIC RETROGRADE CHOLANGIOPANCREATOGRAPHY DIRECT VISUALIZATION performed by Vikash Sandoval MD at [facility] [place] Left 07/03/2013    ORIF tibial plateau    FRACTURE SURGERY Right     small finger metacarpal fracture    HAND SURGERY      HYSTERECTOMY (CERVIX STATUS UNKNOWN)  2003    SPINE SURGERY N/A 09/10/2021    KYPHOPLASTY lumbar L5 performed by Froylan Evans MD at [address] [place] [address] 10/22/2020    EGD BIOPSY performed by Radha Mosher MD at [address] [place] [address] 04/05/2021    EGD BIOPSY performed by Radha Mosher MD at [address] [place] [address],[address] N/A 09/08/2021    ENDOSCOPIC ULTRASOUND, LINEAR SCOPE performed by Laurita Berrios MD at [address] [place] [address] N/A 2/9/2022    ENDOSCOPIC ULTRASOUND, EGD - GI SCHEDULED performed by Vikash Sandoval MD at [address] [place] [address]  2/9/2022    EGD STENT REMOVAL performed by Vikash Sandoval MD at [address] [place] [address]:     Social History     Socioeconomic History    Marital status: Single     Spouse name: Not on file    Number of children: Not on file    Years of education: Not on file    Highest education level: Not on file   Occupational History    Not on file   Tobacco Use    Smoking status: Every Day     Packs/day: 1.00     Years: 30.00     Pack years: 30.00     Types: Cigarettes     Passive exposure: Never    Smokeless tobacco: Never    Tobacco comments:     CURRENTLY SMOKES 0.5 PPD    Vaping Use    Vaping Use: Never used   Substance and Sexual Activity    Alcohol use: Not Currently     Alcohol/week: 0.0 standard drinks    Drug use: Yes     Frequency: 2.0 times per week     Types: Marijuana (Weed)     Comment: recreation    Sexual activity: Not Currently   Other Topics Concern    Not on file   Social History Narrative    Not on file     Social Determinants of Health     Financial Resource Strain: Low Risk     Difficulty of Paying Living Expenses: Not very hard   Food Insecurity: No Food Insecurity    Worried About Running Out of Food in the Last Year: Never true    Ran Out of Food in the Last Year: Never true   Transportation Needs: Not on file   Physical Activity: Not on file   Stress: Not on file   Social Connections: Not on file   Intimate Partner Violence: Not on file   Housing Stability: Not on file       CURRENT MEDICATIONS:     Current Outpatient Medications   Medication Sig Dispense Refill    ARIPiprazole (ABILIFY) 5 MG tablet Take 5 mg by mouth daily      omeprazole (PRILOSEC) 40 MG delayed release capsule TAKE ONE CAPSULE BY MOUTH TWICE A DAY IN THE MORNING AND AT BEDTIME 60 capsule 2    atorvastatin (LIPITOR) 20 MG tablet TAKE ONE TABLET BY MOUTH DAILY 30 tablet 3    carBAMazepine (TEGRETOL) 200 MG tablet Take 1 tablet by mouth 3 times daily 90 tablet 5    topiramate (TOPAMAX) 50 MG tablet Take 0.5 tablets by mouth 2 times daily 60 tablet 3    rOPINIRole (REQUIP) 1 MG tablet Take 1 tablet by mouth nightly 90 tablet 3    sodium chloride 1 g tablet Take 1 tablet by mouth 3 times daily (with meals) 90 tablet 3    nicotine (NICODERM CQ) 21 MG/24HR Place 1 patch onto the skin daily 30 patch 3    lipase-protease-amylase (CREON) 92829-05761 units delayed release capsule TAKE ONE CAPSULE BY MOUTH THREE TIMES A DAY WITH A MEAL 90 capsule 2    baclofen (LIORESAL) 10 MG tablet Take 1 tablet by mouth 3 times daily 60 tablet 1    metoclopramide (REGLAN) 5 MG tablet TAKE ONE TABLET BY MOUTH THREE TIMES A DAY 90 tablet 3    fluticasone-umeclidin-vilant (TRELEGY ELLIPTA) 100-62.5-25 MCG/INH AEPB Inhale 1 puff into the lungs daily 3 each 3    pregabalin (LYRICA) 200 MG capsule Take 1 capsule by mouth nightly for 30 days. 90 capsule 3    albuterol sulfate HFA (VENTOLIN HFA) 108 (90 Base) MCG/ACT inhaler Inhale 2 puffs into the lungs 4 times daily as needed for Wheezing 3 each 3    amLODIPine (NORVASC) 10 MG tablet TAKE ONE TABLET BY MOUTH DAILY 90 tablet 1    hydrOXYzine HCl (ATARAX) 50 MG tablet       sucralfate (CARAFATE) 1 GM tablet Take 1 tablet by mouth 4 times daily 120 tablet 3    mirtazapine (REMERON) 15 MG tablet       busPIRone (BUSPAR) 30 MG tablet Take 1 tablet by mouth in the morning and at bedtime      solifenacin (VESICARE) 10 MG tablet Take 1 tablet by mouth in the morning. Handicap Placard MISC by Does not apply route Expires 5/2027 1 each 0    prazosin (MINIPRESS) 1 MG capsule Take 1 capsule by mouth nightly 30 capsule 3    simethicone (MYLICON) 80 MG chewable tablet Take 1 tablet by mouth 4 times daily as needed for Flatulence 180 tablet 3    vitamin D (ERGOCALCIFEROL) 98621 units CAPS capsule Take 1 capsule by mouth once a week 12 capsule 1    folic acid (FOLVITE) 1 MG tablet Take 1 tablet by mouth daily 90 tablet 1     No current facility-administered medications for this visit. ALLERGIES:   No Known Allergies                              REVIEW OF SYSTEMS        All items selected indicate a positive finding. Those items not selected are negative.   Constitutional [] Weight loss/gain   [] Fatigue  [] Fever/Chills   HEENT [] Hearing Loss  [] Visual Disturbance  [] Tinnitus  [] Eye pain   Respiratory [] Shortness of Breath  [] Cough  [] Snoring   Cardiovascular [] Chest Pain  [] Palpitations  [] Lightheaded   GI [] Constipation  [] Diarrhea  [] Swallowing change  [x] Nausea/vomiting    [] Urinary Frequency  [] Urinary Urgency   Musculoskeletal [] Neck pain  [] Back pain  [] Muscle pain  [] Restless legs   Dermatologic [] Skin changes   Neurologic [] Memory loss/confusion  [] Seizures  [] Trouble walking or imbalance  [] Dizziness  [] Sleep disturbance  [] Weakness  [x] Numbness  [] Tremors  [] Speech Difficulty  [x] Headaches  [x] Light Sensitivity  [x] Sound Sensitivity   Endocrinology []Excessive thirst  []Excessive hunger   Psychiatric [] Anxiety/Depression  [] Hallucination   Allergy/immunology []Hives/environmental allergies   Hematologic/lymph [] Abnormal bleeding  [] Abnormal bruising         PHYSICAL EXAMINATION:       Vitals:    02/28/23 1131   BP: 104/63   Pulse: (!) 105                                              .                                                                                                     General Appearance:  Alert, cooperative, no signs of distress, appears stated age   Head:  Normocephalic, no signs of trauma   Eyes:  Conjunctiva/corneas clear;  eyelids intact   Ears:  Normal external ear and canals   Nose: Nares normal, mucosa normal, no drainage    Throat: Lips and tongue normal; teeth normal;  gums normal   Neck: Supple, intact flexion, extension and rotation;   trachea midline;  no adenopathy;   thyroid: not enlarged;   no carotid pulse abnormality   Back:   Symmetric, no curvature, ROM adequate   Lungs:   Respirations unlabored   Heart:  Regular rate and rhythm           Extremities: Extremities normal, no cyanosis, no edema   Pulses: Symmetric over head and neck   Skin: Skin color, texture normal, no rashes, no lesions                                     NEUROLOGIC EXAMINATION    Neurologic Exam  Mental status    Alert and oriented x 3; intact memory with no confusion, speech or language problems; no hallucinations or delusions  Fund of information appropriate for level of education    Cranial nerves    II - visual fields intact to confrontation bilaterally  III, IV, VI - extra-ocular muscles full: no pupillary defect; no CAMMIE, no nystagmus, no ptosis   V - normal facial sensation                                                               VII - normal facial symmetry                                                             VIII - intact hearing                                                                             IX, X - symmetrical palate                                                                  XI - symmetrical shoulder shrug                                                       XII - tongue midline without atrophy or fasciculation      Motor function  Normal muscle bulk and tone; strength 5/5 on all 4 extremities, no pronator drift      Sensory function Intact to light touch, pinprick, vibration, proprioception on all 4 extremities      Cerebellar Intact fine motor movement. No involuntary movements or tremors. No ataxia or dysmetria on finger to nose or heel to shin testing      Reflex function DTR 2+ on bilateral UE and LE, symmetric. Down going toes bilaterally      Gait                   normal base and arm swing                  Medical Decision Making: In summary, your patient, Refugio Martinez exhibits the following, with associated plan:    Chronic migraine headaches which are currently worsened however she attributes this to recent weather fluctuations. Previous medications include gabapentin, prozac, lexapro, magnesium, prozac, Lyrica, carbamazepine  Continue topamax 25 mg twice daily    Continue maxalt for abortive therapy  Seizure disorder with her last seizure occurring in March 2020. The patient was hospitalized after attempting to wean tegretol in August 2021 and was found to have metabolic encephalopathy.    Continue tegretol 200 mg three times daily  Stop lyrica   Continue topamax 25 mg twice daily  She has been having constant numbness of her face which is likely secondary to hypercapnia associated with her COPD   Carbamazepine level and CMP were normal in September 2022  Peripheral neuropathy likely secondary to her history of alcohol abuse  Stop lyrica   Cognitive impairment secondary to her history of alcohol abuse  L4-5 radiculopathy  Stop lyrica   History of an astrocytoma status post two resections in 1989  Return for follow up visit in 3 months with Dr. Shmuel Meek             Signed: Chava Singletary CNP      *Please note that portions of this note were completed with a voice recognition program.  Although every effort was made to insure the accuracy of this automated transcription, some errors in transcription may have occurred, occasionally words and are mis-transcribed    Provider Attestation: The documentation recorded by the scribe accurately reflects the service I personally performed and the decisions made by myself. Portions of this note were transcribed by a scribe. I personally performed the history, physical exam, and medical decision-making and confirm the accuracy of the information in the transcribed note. Scribe Attestation:   By signing my name below, Hamlet Curran, attest that this documentation has been prepared under the direction and in the presence of Chava Singletary CNP.

## 2023-03-07 ENCOUNTER — OFFICE VISIT (OUTPATIENT)
Dept: FAMILY MEDICINE CLINIC | Age: 53
End: 2023-03-07

## 2023-03-07 VITALS
TEMPERATURE: 97.2 F | WEIGHT: 132.6 LBS | BODY MASS INDEX: 22.07 KG/M2 | DIASTOLIC BLOOD PRESSURE: 70 MMHG | HEART RATE: 97 BPM | OXYGEN SATURATION: 94 % | SYSTOLIC BLOOD PRESSURE: 100 MMHG

## 2023-03-07 DIAGNOSIS — Z09 HOSPITAL DISCHARGE FOLLOW-UP: Primary | ICD-10-CM

## 2023-03-07 DIAGNOSIS — S32.000D COMPRESSION FRACTURE OF LUMBAR VERTEBRA WITH ROUTINE HEALING, UNSPECIFIED LUMBAR VERTEBRAL LEVEL, SUBSEQUENT ENCOUNTER: ICD-10-CM

## 2023-03-07 DIAGNOSIS — M48.56XD NON-TRAUMATIC COMPRESSION FRACTURE OF L1 LUMBAR VERTEBRA WITH ROUTINE HEALING, SUBSEQUENT ENCOUNTER: ICD-10-CM

## 2023-03-07 DIAGNOSIS — Z23 NEED FOR PNEUMOCOCCAL VACCINATION: ICD-10-CM

## 2023-03-07 DIAGNOSIS — M80.80XD OTHER OSTEOPOROSIS WITH CURRENT PATHOLOGICAL FRACTURE WITH ROUTINE HEALING, SUBSEQUENT ENCOUNTER: ICD-10-CM

## 2023-03-07 RX ORDER — DENOSUMAB 60 MG/ML
60 INJECTION SUBCUTANEOUS
Qty: 1 ML | Refills: 1 | Status: SHIPPED | OUTPATIENT
Start: 2023-04-26

## 2023-03-07 RX ORDER — OXYCODONE HYDROCHLORIDE 5 MG/1
5 TABLET ORAL EVERY 6 HOURS PRN
Qty: 20 TABLET | Refills: 0 | Status: CANCELLED | OUTPATIENT
Start: 2023-03-07 | End: 2023-03-12

## 2023-03-07 SDOH — ECONOMIC STABILITY: FOOD INSECURITY: WITHIN THE PAST 12 MONTHS, THE FOOD YOU BOUGHT JUST DIDN'T LAST AND YOU DIDN'T HAVE MONEY TO GET MORE.: NEVER TRUE

## 2023-03-07 SDOH — ECONOMIC STABILITY: HOUSING INSECURITY
IN THE LAST 12 MONTHS, WAS THERE A TIME WHEN YOU DID NOT HAVE A STEADY PLACE TO SLEEP OR SLEPT IN A SHELTER (INCLUDING NOW)?: NO

## 2023-03-07 SDOH — ECONOMIC STABILITY: FOOD INSECURITY: WITHIN THE PAST 12 MONTHS, YOU WORRIED THAT YOUR FOOD WOULD RUN OUT BEFORE YOU GOT MONEY TO BUY MORE.: NEVER TRUE

## 2023-03-07 SDOH — ECONOMIC STABILITY: INCOME INSECURITY: HOW HARD IS IT FOR YOU TO PAY FOR THE VERY BASICS LIKE FOOD, HOUSING, MEDICAL CARE, AND HEATING?: NOT VERY HARD

## 2023-03-07 ASSESSMENT — PATIENT HEALTH QUESTIONNAIRE - PHQ9
9. THOUGHTS THAT YOU WOULD BE BETTER OFF DEAD, OR OF HURTING YOURSELF: 0
7. TROUBLE CONCENTRATING ON THINGS, SUCH AS READING THE NEWSPAPER OR WATCHING TELEVISION: 0
10. IF YOU CHECKED OFF ANY PROBLEMS, HOW DIFFICULT HAVE THESE PROBLEMS MADE IT FOR YOU TO DO YOUR WORK, TAKE CARE OF THINGS AT HOME, OR GET ALONG WITH OTHER PEOPLE: 0
3. TROUBLE FALLING OR STAYING ASLEEP: 0
SUM OF ALL RESPONSES TO PHQ QUESTIONS 1-9: 0
5. POOR APPETITE OR OVEREATING: 0
4. FEELING TIRED OR HAVING LITTLE ENERGY: 0
1. LITTLE INTEREST OR PLEASURE IN DOING THINGS: 0
SUM OF ALL RESPONSES TO PHQ QUESTIONS 1-9: 0
SUM OF ALL RESPONSES TO PHQ QUESTIONS 1-9: 0
8. MOVING OR SPEAKING SO SLOWLY THAT OTHER PEOPLE COULD HAVE NOTICED. OR THE OPPOSITE, BEING SO FIGETY OR RESTLESS THAT YOU HAVE BEEN MOVING AROUND A LOT MORE THAN USUAL: 0
6. FEELING BAD ABOUT YOURSELF - OR THAT YOU ARE A FAILURE OR HAVE LET YOURSELF OR YOUR FAMILY DOWN: 0
SUM OF ALL RESPONSES TO PHQ QUESTIONS 1-9: 0
SUM OF ALL RESPONSES TO PHQ9 QUESTIONS 1 & 2: 0
2. FEELING DOWN, DEPRESSED OR HOPELESS: 0

## 2023-03-07 NOTE — PROGRESS NOTES
Pt is here today for a hospital f/u    Pt was at [facility]. V's on  01/01/2023 thru 01/11/2023 for COPD, pneumonia, pt states she was septic     Pt states she is still not 100%, still having low energy levels, having numbness and tingling in her legs, arms and face     Pt states she did fall about a week ago, stopped her psych meds, was having trouble with balance and words, has not had psych meds in about 6 weeks     Pt states that she thought towards the end of her visit at the hospital, she thought she had a seizure, was told she may have a had panic attack     BP low, feeling lightheaded and dizzy often

## 2023-03-07 NOTE — PROGRESS NOTES
Post-Discharge Transitional Care  Follow Up      Abimael Marcial   YOB: 1970    Date of Office Visit:  3/7/2023  Date of Hospital Admission: 1/1/23  Date of Hospital Discharge: 1/11/23  Risk of hospital readmission (high >=14%. Medium >=10%) :Readmission Risk Score: 14.1      Care management risk score Rising risk (score 2-5) and Complex Care (Scores >=6): No Risk Score On File     Non face to face  following discharge, date last encounter closed (first attempt may have been earlier): *No documented post hospital discharge outreach found in the last 14 days    Call initiated 2 business days of discharge: *No response recorded in the last 14 days    ASSESSMENT/PLAN:   Hospital discharge follow-up  -     ME DISCHARGE MEDS RECONCILED W/ CURRENT OUTPATIENT MED LIST  Need for pneumococcal vaccination  -     Pneumococcal, PCV20, PREVNAR 21, (age 25 yrs+), IM, PF  Non-traumatic compression fracture of L1 lumbar vertebra with routine healing, subsequent encounter  -     denosumab (PROLIA) 60 MG/ML SOSY SC injection; Inject 1 mL into the skin every 6 months, Disp-1 mL, R-1Print  -     External Referral To Pain Clinic  The following orders have not been finalized:  -     oxyCODONE (ROXICODONE) 5 MG immediate release tablet  Compression fracture of lumbar vertebra with routine healing, unspecified lumbar vertebral level, subsequent encounter  -     External Referral To Pain Clinic  The following orders have not been finalized:  -     oxyCODONE (ROXICODONE) 5 MG immediate release tablet  Other osteoporosis with current pathological fracture with routine healing, subsequent encounter  -     denosumab (PROLIA) 60 MG/ML SOSY SC injection; Inject 1 mL into the skin every 6 months, Disp-1 mL, R-1Print  -     External Referral To Pain Clinic      Medical Decision Making: high complexity  No follow-ups on file.     On this date 3/7/2023 I have spent 15 minutes reviewing previous notes, test results and face to face with the patient discussing the diagnosis and importance of compliance with the treatment plan as well as documenting on the day of the visit.       Subjective:   HPI:  Follow up of Hospital problems/diagnosis(es): was admitted for the management of   Acute on chronic respiratory failure with hypoxia (HCC) , presented to ER with Fatigue, Fall, and Respiratory Distress. Intubated 1/4-1/7/23 and then extubated, transferred to floor, discharged home on 1/11.         Significant therapeutic interventions:      COPD exacerbation with PNA s/p intubation. Continue abx and prednisone taper. Supplemental oxygen. Nebulizers prn.   Home medications and prn medications for mental health and anxiety. Minipress, Buspar, Atarax and Ativan prn.   Chronic pancreatitis. On Zenpep.  Smoking cessation education. Nicotine patch on.\"    Inpatient course: Discharge summary reviewed- see chart.    Interval history/Current status: fatigued, tired all the time   Using trelegy as ordered daily, using albuterol two puffs once a day if needed. Oxygen levels have been good.   Discharged home on baseline nocturnal oxygen  Has seen neurology - forgot about her episode of shaking and unable to speak for 15-20 minutes while admitted to hospital post transfer from ICU,the shaking lasted less than 30 s. Was told it is a panic attack, but felt more like a seizure - generalized shaking, fully aware, able to move slowly but not able to open eyes or speak. Got confused for about a half hour after the episode. She does not recall this happening prior with her seizures. Went back to normal thereafter. States she called her nurse, was able to answer questions nonverbally but was unable to speak. This has not reoccurred since discharge home.       Patient Active Problem List   Diagnosis    Acute bronchitis    Reflex sympathetic dystrophy of lower limb    Essential hypertension    Nicotine addiction    Psychophysiological insomnia    Anxiety    Alcoholic  peripheral neuropathy (HCC)    Hypokalemia    Macrocytic anemia    Hypomagnesemia    Tobacco use    Ambulatory dysfunction    Seizure disorder (HCC)    COPD exacerbation (HCC)    Paresthesia of lower extremity    Acute respiratory failure with hypoxia (HCC)    Pancreatic cyst    Recurrent major depressive disorder (HCC)    Elevated CA 19-9 level    Perineal mass, female    Esophagitis candidal     Condyloma    Astrocytoma (HCC) - diagnosed at age 24, the patient underwent 2 surgical resections without known recurrence    Alcohol use disorder, severe, in early remission ([address]    Metabolic encephalopathy    AMAN (generalized anxiety disorder)    Major depressive disorder, recurrent severe without psychotic features ([address]    Esophageal dysphagia    Chronic peripheral neuropathic pain    History of alcohol abuse    History of petit-mal seizures    Intractable episodic tension-type headache    Personal history of astrocytoma    Multilevel spine pain    Chronic pain syndrome    Marijuana abuse    Severe sepsis (HCC)    Closed fracture of fifth thoracic vertebra (HCC)    Diverticulitis of large intestine with abscess without bleeding    Elevated alkaline phosphatase level    Chronic pancreatitis (HCC)    Erythema ab igne    Compression fracture of thoracic vertebra (HCC)    Pathological compression fracture of lumbar vertebra with delayed healing    Metabolic acidosis with increased anion gap and accumulation of organic acids    Pneumonia of both lungs due to infectious organism    Septicemia ([address]    Alcohol-induced acute pancreatitis    Fluid collection of pancreas    Diverticulitis of large intestine without perforation or abscess with bleeding    Pseudocyst of pancreas    Sepsis ([address]    Acute recurrent pancreatitis    Atelectasis of left lung    Hyponatremia    Iron deficiency anemia    Adenomatous polyp of sigmoid colon    Moderate episode of recurrent major depressive disorder (HCC)    Sleep disturbance Intractable migraine without aura and without status migrainosus    Acute on chronic respiratory failure with hypoxia (HCC)    Altered mental status       Medications listed as ordered at the time of discharge from hospital     Medication List            Accurate as of March 7, 2023  2:57 PM. If you have any questions, ask your nurse or doctor.                 CONTINUE taking these medications      albuterol sulfate  (90 Base) MCG/ACT inhaler  Commonly known as: Ventolin HFA  Inhale 2 puffs into the lungs 4 times daily as needed for Wheezing     amLODIPine 10 MG tablet  Commonly known as: NORVASC  TAKE ONE TABLET BY MOUTH DAILY     ARIPiprazole 5 MG tablet  Commonly known as: ABILIFY     atorvastatin 20 MG tablet  Commonly known as: LIPITOR  TAKE ONE TABLET BY MOUTH DAILY     baclofen 10 MG tablet  Commonly known as: LIORESAL  Take 1 tablet by mouth 3 times daily     busPIRone 30 MG tablet  Commonly known as: BUSPAR     carBAMazepine 200 MG tablet  Commonly known as: TEGRETOL  Take 1 tablet by mouth 3 times daily     Creon 32148-40103 units delayed release capsule  Generic drug: lipase-protease-amylase  TAKE ONE CAPSULE BY MOUTH THREE TIMES A DAY WITH A MEAL     fluticasone-umeclidin-vilant 100-62.5-25 MCG/INH Aepb  Commonly known as: TRELEGY ELLIPTA  Inhale 1 puff into the lungs daily     folic acid 1 MG tablet  Commonly known as: FOLVITE  Take 1 tablet by mouth daily     Handicap Placard Misc  by Does not apply route Expires 5/2027     hydrOXYzine HCl 50 MG tablet  Commonly known as: ATARAX     metoclopramide 5 MG tablet  Commonly known as: REGLAN  TAKE ONE TABLET BY MOUTH THREE TIMES A DAY     mirtazapine 15 MG tablet  Commonly known as: REMERON     nicotine 21 MG/24HR  Commonly known as: NICODERM CQ  Place 1 patch onto the skin daily     omeprazole 40 MG delayed release capsule  Commonly known as: PRILOSEC  TAKE ONE CAPSULE BY MOUTH TWICE A DAY IN THE MORNING AND AT BEDTIME     prazosin 1 MG capsule  Commonly known as: MINIPRESS  Take 1 capsule by mouth nightly     rOPINIRole 1 MG tablet  Commonly known as: REQUIP  Take 1 tablet by mouth nightly     simethicone 80 MG chewable tablet  Commonly known as: MYLICON  Take 1 tablet by mouth 4 times daily as needed for Flatulence     sodium chloride 1 g tablet  Take 1 tablet by mouth 3 times daily (with meals)     solifenacin 10 MG tablet  Commonly known as: VESICARE     sucralfate 1 GM tablet  Commonly known as: CARAFATE  Take 1 tablet by mouth 4 times daily     topiramate 50 MG tablet  Commonly known as:  Topamax  Take 0.5 tablets by mouth 2 times daily     vitamin D 1.25 MG (87712 UT) Caps capsule  Commonly known as: ERGOCALCIFEROL  Take 1 capsule by mouth once a week                Medications marked \"taking\" at this time  Outpatient Medications Marked as Taking for the 3/7/23 encounter (Office Visit) with Hellen Weeks MD   Medication Sig Dispense Refill    ARIPiprazole (ABILIFY) 5 MG tablet Take 5 mg by mouth daily      omeprazole (PRILOSEC) 40 MG delayed release capsule TAKE ONE CAPSULE BY MOUTH TWICE A DAY IN THE MORNING AND AT BEDTIME 60 capsule 2    atorvastatin (LIPITOR) 20 MG tablet TAKE ONE TABLET BY MOUTH DAILY 30 tablet 3    carBAMazepine (TEGRETOL) 200 MG tablet Take 1 tablet by mouth 3 times daily 90 tablet 5    topiramate (TOPAMAX) 50 MG tablet Take 0.5 tablets by mouth 2 times daily 60 tablet 3    rOPINIRole (REQUIP) 1 MG tablet Take 1 tablet by mouth nightly 90 tablet 3    sodium chloride 1 g tablet Take 1 tablet by mouth 3 times daily (with meals) 90 tablet 3    lipase-protease-amylase (CREON) 27701-90801 units delayed release capsule TAKE ONE CAPSULE BY MOUTH THREE TIMES A DAY WITH A MEAL 90 capsule 2    metoclopramide (REGLAN) 5 MG tablet TAKE ONE TABLET BY MOUTH THREE TIMES A DAY 90 tablet 3    fluticasone-umeclidin-vilant (TRELEGY ELLIPTA) 100-62.5-25 MCG/INH AEPB Inhale 1 puff into the lungs daily 3 each 3    albuterol sulfate HFA (VENTOLIN HFA) 108 (90 Base) MCG/ACT inhaler Inhale 2 puffs into the lungs 4 times daily as needed for Wheezing 3 each 3    amLODIPine (NORVASC) 10 MG tablet TAKE ONE TABLET BY MOUTH DAILY 90 tablet 1    sucralfate (CARAFATE) 1 GM tablet Take 1 tablet by mouth 4 times daily 120 tablet 3    mirtazapine (REMERON) 15 MG tablet       solifenacin (VESICARE) 10 MG tablet Take 1 tablet by mouth in the morning. prazosin (MINIPRESS) 1 MG capsule Take 1 capsule by mouth nightly 30 capsule 3    vitamin D (ERGOCALCIFEROL) 22105 units CAPS capsule Take 1 capsule by mouth once a week 12 capsule 1        Medications patient taking as of now reconciled against medications ordered at time of hospital discharge: Yes    A comprehensive review of systems was negative except for what was noted in the HPI.     Objective:    /70 (Site: Left Upper Arm, Position: Sitting, Cuff Size: Medium Adult)   Pulse 97   Temp 97.2 °F (36.2 °C)   Wt 132 lb 9.6 oz (60.1 kg)   SpO2 94%   BMI 22.07 kg/m²   General Appearance: alert and oriented to person, place and time, well developed and well- nourished, in no acute distress  Skin: warm and dry, no rash or erythema  Head: normocephalic and atraumatic  Eyes: pupils equal, round, and reactive to light, extraocular eye movements intact, conjunctivae normal  ENT: tympanic membrane, external ear and ear canal normal bilaterally, nose without deformity, nasal mucosa and turbinates normal without polyps  Neck: supple and non-tender without mass, no thyromegaly or thyroid nodules, no cervical lymphadenopathy  Pulmonary/Chest: clear to auscultation bilaterally- no wheezes, rales or rhonchi, normal air movement, no respiratory distress  Cardiovascular: normal rate, regular rhythm, normal S1 and S2, no murmurs, rubs, clicks, or gallops, distal pulses intact, no carotid bruits  Abdomen: soft, non-tender, non-distended, normal bowel sounds, no masses or organomegaly  Extremities: no cyanosis, clubbing or edema  Musculoskeletal: normal range of motion, no joint swelling, deformity or tenderness  Neurologic: reflexes normal and symmetric, no cranial nerve deficit, gait, coordination and speech normal      An electronic signature was used to authenticate this note.   --Quincy Dinero MD

## 2023-03-10 ENCOUNTER — HOSPITAL ENCOUNTER (OUTPATIENT)
Dept: PSYCHIATRY | Age: 53
Setting detail: THERAPIES SERIES
Discharge: HOME | End: 2023-03-10

## 2023-03-15 ENCOUNTER — TELEPHONE (OUTPATIENT)
Dept: FAMILY MEDICINE CLINIC | Age: 53
End: 2023-03-15

## 2023-03-15 DIAGNOSIS — M48.56XD NON-TRAUMATIC COMPRESSION FRACTURE OF L1 LUMBAR VERTEBRA WITH ROUTINE HEALING, SUBSEQUENT ENCOUNTER: Primary | ICD-10-CM

## 2023-03-15 RX ORDER — OXYCODONE HYDROCHLORIDE 5 MG/1
5 TABLET ORAL EVERY 6 HOURS PRN
Qty: 20 TABLET | Refills: 0 | Status: SHIPPED | OUTPATIENT
Start: 2023-03-15 | End: 2023-03-20

## 2023-03-15 NOTE — TELEPHONE ENCOUNTER
Patient states when she was in on 03/07/2023 it was discussed that 20 tabs of Oxycodone would be called due to her back pain.   Gilford Henri called to see if that can be done please

## 2023-03-17 ENCOUNTER — TELEPHONE (OUTPATIENT)
Dept: FAMILY MEDICINE CLINIC | Age: 53
End: 2023-03-17

## 2023-03-17 NOTE — TELEPHONE ENCOUNTER
Received call from Evan Roberto with [facility]. She stated patient's Prolia needs a Prior Auth. She stated she did start a Key in [email or web address].    KEY: [id number] : [phone]

## 2023-03-24 ENCOUNTER — TELEPHONE (OUTPATIENT)
Dept: PULMONOLOGY | Age: 53
End: 2023-03-24

## 2023-03-24 ENCOUNTER — CLINICAL DOCUMENTATION (OUTPATIENT)
Dept: PSYCHIATRY | Age: 53
End: 2023-03-24

## 2023-03-24 NOTE — TELEPHONE ENCOUNTER
Pt stated she is having SOB, rattling in her chest, coughing up yellowish green phlegm, O2 is dropping to 86% and taking a while to get back up. Pt is asking for a steroid. Informed her to go to ED if it gets worse or her O2 continues to drop. Pt voiced understanding.  Pending rx for Prednisone if you agree

## 2023-03-29 DIAGNOSIS — R13.19 ESOPHAGEAL DYSPHAGIA: ICD-10-CM

## 2023-03-29 DIAGNOSIS — K86.0 ALCOHOL-INDUCED CHRONIC PANCREATITIS (HCC): ICD-10-CM

## 2023-03-29 DIAGNOSIS — I10 ESSENTIAL HYPERTENSION: ICD-10-CM

## 2023-03-29 DIAGNOSIS — E55.9 VITAMIN D DEFICIENCY: ICD-10-CM

## 2023-03-29 DIAGNOSIS — J42 CHRONIC BRONCHITIS, UNSPECIFIED CHRONIC BRONCHITIS TYPE (HCC): ICD-10-CM

## 2023-03-29 DIAGNOSIS — K31.84 GASTROPARESIS: ICD-10-CM

## 2023-03-29 RX ORDER — METOCLOPRAMIDE 5 MG/1
5 TABLET ORAL 3 TIMES DAILY
Qty: 90 TABLET | Refills: 3 | Status: SHIPPED | OUTPATIENT
Start: 2023-03-29

## 2023-03-29 RX ORDER — SUCRALFATE 1 G/1
1 TABLET ORAL 4 TIMES DAILY
Qty: 120 TABLET | Refills: 3 | Status: SHIPPED | OUTPATIENT
Start: 2023-03-29

## 2023-03-29 RX ORDER — PANCRELIPASE 24000; 76000; 120000 [USP'U]/1; [USP'U]/1; [USP'U]/1
CAPSULE, DELAYED RELEASE PELLETS ORAL
Qty: 90 CAPSULE | Refills: 2 | Status: SHIPPED | OUTPATIENT
Start: 2023-03-29

## 2023-03-29 RX ORDER — AMLODIPINE BESYLATE 10 MG/1
10 TABLET ORAL DAILY
Qty: 90 TABLET | Refills: 1 | Status: SHIPPED | OUTPATIENT
Start: 2023-03-29

## 2023-03-29 RX ORDER — OMEPRAZOLE 40 MG/1
CAPSULE, DELAYED RELEASE ORAL
Qty: 60 CAPSULE | Refills: 2 | Status: SHIPPED | OUTPATIENT
Start: 2023-03-29

## 2023-03-29 RX ORDER — ALBUTEROL SULFATE 90 UG/1
2 AEROSOL, METERED RESPIRATORY (INHALATION) 4 TIMES DAILY PRN
Qty: 3 EACH | Refills: 3 | Status: SHIPPED | OUTPATIENT
Start: 2023-03-29

## 2023-03-29 RX ORDER — ERGOCALCIFEROL 1.25 MG/1
50000 CAPSULE ORAL WEEKLY
Qty: 12 CAPSULE | Refills: 1 | Status: SHIPPED | OUTPATIENT
Start: 2023-03-29

## 2023-03-29 NOTE — TELEPHONE ENCOUNTER
Last visit: 03/07/2023  Last Med refill: UNSURE OF LAST FILL DATE  Does patient have enough medication for 72 hours:unknown    Using different pharmacy    Next Visit Date:  Future Appointments   Date Time Provider Lexi Rodriguezi   4/7/2023  2:00 PM Javier Pear, [facility]   4/21/2023  2:00 PM Javier Pear, [facility]   5/2/2023  1:45 PM Arsenio Posey MD Resp Spec [facility]   5/5/2023  2:00 PM Javier Pear, [facility]   5/19/2023  2:00 PM Javier Pear, [facility]   5/30/2023  1:00 PM Kyle Guardado MD Neuro Spec Neurology -   6/2/2023  2:00 PM Javier Pear, [facility]   6/7/2023  1:45 PM Ludivina Pruitt MD [facility]   6/16/2023  2:00 PM Javier Pear, [facility]   6/30/2023  2:00 PM Javier Pear, [facility]   7/14/2023  2:00 PM Javier Pear, [facility]   7/21/2023  2:00 PM Javier Pear, [facility]   8/4/2023  2:00 PM Javier Pear, [facility]   8/18/2023  2:00 PM Javier Pear, [facility]   9/15/2023  2:00 PM Javier Pear, [facility]   9/29/2023  2:00 PM Javier Pear, [facility]   10/13/2023  2:00 PM Javier Pear, [facility]   10/27/2023  2:00 PM Javier Pear, [id number] Maintenance   Topic Date Due    COVID-19 Vaccine (5 - Booster for Pfizer series) 08/04/2022    Lipids  09/01/2023    Low dose CT lung screening  11/09/2023    Depression Monitoring  03/07/2024    Breast cancer screen  04/08/2024    DTaP/Tdap/Td vaccine (2 - Td or Tdap) 12/28/2030    Colorectal Cancer Screen  11/19/2031    Flu vaccine  Completed    Shingles vaccine  Completed    Pneumococcal 0-64 years Vaccine  Completed    Hepatitis C screen  Completed    HIV screen  Completed    Hepatitis A vaccine  Aged Out    Hib

## 2023-03-30 RX ORDER — PREDNISONE 20 MG/1
40 TABLET ORAL DAILY
Qty: 10 TABLET | Refills: 0 | Status: SHIPPED | OUTPATIENT
Start: 2023-03-30 | End: 2023-04-04

## 2023-04-21 ENCOUNTER — HOSPITAL ENCOUNTER (OUTPATIENT)
Dept: PSYCHIATRY | Age: 53
Setting detail: THERAPIES SERIES
Discharge: HOME | End: 2023-04-21

## 2023-05-08 RX ORDER — ATORVASTATIN CALCIUM 20 MG/1
TABLET, FILM COATED ORAL
Qty: 28 TABLET | Refills: 0 | Status: SHIPPED | OUTPATIENT
Start: 2023-05-08

## 2023-05-08 NOTE — TELEPHONE ENCOUNTER
Pharmacy requesting refill of atorvastatin (LIPITOR) 20 MG tablet      Medication active on med list yes      Date of last Rx: 1/15/2023 with 3 refills          verified by JEMIMA LLAMAS      Date of last appointment 02/28/2023    Next Visit Date:  5/30/2023      Please fill for [facility] as she is currently out of the office.

## 2023-05-19 ENCOUNTER — HOSPITAL ENCOUNTER (OUTPATIENT)
Dept: PSYCHIATRY | Age: 53
Setting detail: THERAPIES SERIES
Discharge: HOME | End: 2023-05-19

## 2023-05-30 ENCOUNTER — OFFICE VISIT (OUTPATIENT)
Dept: NEUROLOGY | Age: 53
End: 2023-05-30
Payer: MEDICAID

## 2023-05-30 ENCOUNTER — TELEPHONE (OUTPATIENT)
Dept: GASTROENTEROLOGY | Age: 53
End: 2023-05-30

## 2023-05-30 VITALS
HEART RATE: 82 BPM | SYSTOLIC BLOOD PRESSURE: 114 MMHG | BODY MASS INDEX: 22.66 KG/M2 | HEIGHT: 65 IN | DIASTOLIC BLOOD PRESSURE: 83 MMHG | WEIGHT: 136 LBS

## 2023-05-30 DIAGNOSIS — G57.93 NEUROPATHIC PAIN OF BOTH LEGS: ICD-10-CM

## 2023-05-30 DIAGNOSIS — R56.9 SEIZURE-LIKE ACTIVITY (HCC): Primary | ICD-10-CM

## 2023-05-30 DIAGNOSIS — G43.009 MIGRAINE WITHOUT AURA AND WITHOUT STATUS MIGRAINOSUS, NOT INTRACTABLE: ICD-10-CM

## 2023-05-30 DIAGNOSIS — G40.909 SEIZURE DISORDER (HCC): ICD-10-CM

## 2023-05-30 PROCEDURE — 3079F DIAST BP 80-89 MM HG: CPT | Performed by: PSYCHIATRY & NEUROLOGY

## 2023-05-30 PROCEDURE — 3074F SYST BP LT 130 MM HG: CPT | Performed by: PSYCHIATRY & NEUROLOGY

## 2023-05-30 PROCEDURE — 99215 OFFICE O/P EST HI 40 MIN: CPT | Performed by: PSYCHIATRY & NEUROLOGY

## 2023-05-30 RX ORDER — CARBAMAZEPINE 200 MG/1
200 TABLET ORAL 3 TIMES DAILY
Qty: 90 TABLET | Refills: 5 | Status: SHIPPED | OUTPATIENT
Start: 2023-05-30

## 2023-05-30 RX ORDER — TOPIRAMATE 50 MG/1
25 TABLET, FILM COATED ORAL 2 TIMES DAILY
Qty: 60 TABLET | Refills: 3 | Status: SHIPPED | OUTPATIENT
Start: 2023-05-30

## 2023-05-30 RX ORDER — DULOXETIN HYDROCHLORIDE 30 MG/1
30 CAPSULE, DELAYED RELEASE ORAL DAILY
Qty: 30 CAPSULE | Refills: 1 | Status: SHIPPED | OUTPATIENT
Start: 2023-05-30

## 2023-05-30 NOTE — PROGRESS NOTES
never used smokeless tobacco. She reports that she does not currently use alcohol. She reports current drug use. Frequency: 2.00 times per week. Drug: Marijuana Vaishnavi Weller). Family History   Problem Relation Age of Onset    Other Father     Cancer Maternal Grandmother     Heart Disease Paternal Grandmother     Esophageal Cancer Maternal Aunt     Arthritis Mother        On exam: Blood pressure 114/83, pulse 82, height 5' 5\" (1.651 m), weight 136 lb (61.7 kg). NEUROLOGIC EXAMINATION  GENERAL  Appears comfortable and in no distress   HEENT  NC/ AT   NECK  Supple and no bruits heard   MENTAL STATUS:  Alert, oriented, intact memory, no confusion, normal speech, normal language, no hallucination or delusion   CRANIAL NERVES: II     -      Visual fields intact to confrontation  III,IV,VI -  EOMs full, no afferent defect, no                      CAMMIE, no ptosis  V     -     Normal facial sensation  VII    -     Normal facial symmetry  VIII   -     Intact hearing  IX,X -     Symmetrical palate  XI    -     Symmetrical shoulder shrug  XII   -     Midline tongue, no atrophy    MOTOR FUNCTION:  significant for good strength of grade 5/5 in bilateral proximal and distal muscle groups of both upper and lower extremities with normal bulk, normal tone and no involuntary movements, no tremor   SENSORY FUNCTION: Diminished pinprick and temperature in distal lower legs and feet    CEREBELLAR FUNCTION:  Intact fine motor control over upper limbs   REFLEX FUNCTION:  Symmetric, no perverted reflex, no Babinski sign   STATION and GAIT  Normal station, wide based gait; could not do tandem gait     MRI Brain (w/wo) 12/31/2021: No acute hemorrhage mass effect or midline shift. Area of remote parenchymal change in right parietal lobe due to prior shunt tubing. No abnormal postcontrast enhancement.         Impression and Plan: Ms. Murry Child is a 47 y.o. female with   Seizure disorder: ? Sz like activity earlier this year; presently on

## 2023-06-05 ENCOUNTER — TELEPHONE (OUTPATIENT)
Dept: FAMILY MEDICINE CLINIC | Age: 53
End: 2023-06-05

## 2023-06-05 DIAGNOSIS — D64.9 ANEMIA, UNSPECIFIED TYPE: ICD-10-CM

## 2023-06-05 DIAGNOSIS — E87.1 HYPONATREMIA: Primary | ICD-10-CM

## 2023-06-05 DIAGNOSIS — Z13.29 SCREENING FOR THYROID DISORDER: ICD-10-CM

## 2023-06-05 NOTE — TELEPHONE ENCOUNTER
Pharmacy requesting refill of atorvastatin 20 mg.      Medication active on med list yes      Date of last Rx: 5/8/2023 with 0 refills          verified by SB, RMA      Date of last appointment 5/30/2023    Next Visit Date:  8/28/2023

## 2023-06-05 NOTE — TELEPHONE ENCOUNTER
Patient called asking if labs can be ordered. States she is getting labs done by her neurologist and would like to do everything at the same time. Patient's next appt is 6/7.

## 2023-06-06 ENCOUNTER — HOSPITAL ENCOUNTER (OUTPATIENT)
Age: 53
Setting detail: SPECIMEN
Discharge: HOME | End: 2023-06-06

## 2023-06-06 DIAGNOSIS — E87.1 HYPONATREMIA: ICD-10-CM

## 2023-06-06 DIAGNOSIS — N39.0 UTI (URINARY TRACT INFECTION), UNCOMPLICATED: ICD-10-CM

## 2023-06-06 DIAGNOSIS — Z13.29 SCREENING FOR THYROID DISORDER: ICD-10-CM

## 2023-06-06 DIAGNOSIS — D64.9 ANEMIA, UNSPECIFIED TYPE: ICD-10-CM

## 2023-06-06 DIAGNOSIS — G40.909 SEIZURE DISORDER (HCC): ICD-10-CM

## 2023-06-06 LAB
ALBUMIN SERPL-MCNC: 4 G/DL (ref 3.5–5.2)
ALBUMIN/GLOB SERPL: 1.3 {RATIO} (ref 1–2.5)
ALP SERPL-CCNC: 164 U/L (ref 35–104)
ALT SERPL-CCNC: <5 U/L (ref 5–33)
ANION GAP SERPL CALCULATED.3IONS-SCNC: 16 MMOL/L (ref 9–17)
AST SERPL-CCNC: 14 U/L
BACTERIA URNS QL MICRO: ABNORMAL
BASOPHILS # BLD: <0.03 K/UL (ref 0–0.2)
BASOPHILS NFR BLD: 0 % (ref 0–2)
BILIRUB SERPL-MCNC: 0.2 MG/DL (ref 0.3–1.2)
BILIRUB UR QL STRIP: NEGATIVE
BUN SERPL-MCNC: 9 MG/DL (ref 6–20)
CALCIUM SERPL-MCNC: 9 MG/DL (ref 8.6–10.4)
CARBAMAZEPINE DOSE AMOUNT: NORMAL
CARBAMAZEPINE SERPL-MCNC: 6.8 UG/ML (ref 4–12)
CASTS #/AREA URNS LPF: ABNORMAL /LPF (ref 0–8)
CHLORIDE SERPL-SCNC: 90 MMOL/L (ref 98–107)
CLARITY UR: CLEAR
CO2 SERPL-SCNC: 22 MMOL/L (ref 20–31)
COLOR UR: YELLOW
CREAT SERPL-MCNC: 0.39 MG/DL (ref 0.5–0.9)
DATE LAST DOSE: NORMAL
EOSINOPHIL # BLD: <0.03 K/UL (ref 0–0.44)
EOSINOPHILS RELATIVE PERCENT: 0 % (ref 1–4)
EPI CELLS #/AREA URNS HPF: ABNORMAL /HPF (ref 0–5)
ERYTHROCYTE [DISTWIDTH] IN BLOOD BY AUTOMATED COUNT: 15.5 % (ref 11.8–14.4)
FERRITIN SERPL-MCNC: 34 NG/ML (ref 13–150)
FOLATE SERPL-MCNC: 6.6 NG/ML
GFR SERPL CREATININE-BSD FRML MDRD: >60 ML/MIN/1.73M2
GLUCOSE SERPL-MCNC: 118 MG/DL (ref 70–99)
GLUCOSE UR STRIP-MCNC: NEGATIVE MG/DL
HCT VFR BLD AUTO: 39.4 % (ref 36.3–47.1)
HGB BLD-MCNC: 13.1 G/DL (ref 11.9–15.1)
HGB UR QL STRIP.AUTO: NEGATIVE
IMM GRANULOCYTES # BLD AUTO: 0.06 K/UL (ref 0–0.3)
IMM GRANULOCYTES NFR BLD: 1 %
IRON SATN MFR SERPL: 9 % (ref 20–55)
IRON SERPL-MCNC: 30 UG/DL (ref 37–145)
KETONES UR STRIP-MCNC: ABNORMAL MG/DL
LEUKOCYTE ESTERASE UR QL STRIP: ABNORMAL
LYMPHOCYTES # BLD: 17 % (ref 24–43)
LYMPHOCYTES NFR BLD: 1.93 K/UL (ref 1.1–3.7)
MCH RBC QN AUTO: 29.9 PG (ref 25.2–33.5)
MCHC RBC AUTO-ENTMCNC: 33.2 G/DL (ref 28.4–34.8)
MCV RBC AUTO: 90 FL (ref 82.6–102.9)
MONOCYTES NFR BLD: 1 K/UL (ref 0.1–1.2)
MONOCYTES NFR BLD: 9 % (ref 3–12)
NEUTROPHILS NFR BLD: 73 % (ref 36–65)
NEUTS SEG NFR BLD: 8.35 K/UL (ref 1.5–8.1)
NITRITE UR QL STRIP: NEGATIVE
NRBC AUTOMATED: 0 PER 100 WBC
PH UR STRIP: 6.5 [PH] (ref 5–8)
PLATELET # BLD AUTO: 460 K/UL (ref 138–453)
PMV BLD AUTO: 9.7 FL (ref 8.1–13.5)
POTASSIUM SERPL-SCNC: 4.4 MMOL/L (ref 3.7–5.3)
PROT SERPL-MCNC: 7.2 G/DL (ref 6.4–8.3)
PROT UR STRIP-MCNC: ABNORMAL MG/DL
RBC # BLD AUTO: 4.38 M/UL (ref 3.95–5.11)
RBC # BLD: ABNORMAL 10*6/UL
RBC #/AREA URNS HPF: ABNORMAL /HPF (ref 0–4)
SODIUM SERPL-SCNC: 128 MMOL/L (ref 135–144)
SP GR UR STRIP: 1.02 (ref 1–1.03)
TIBC SERPL-MCNC: 331 UG/DL (ref 250–450)
TME LAST DOSE: NORMAL H
TSH SERPL-MCNC: 0.93 UIU/ML (ref 0.3–5)
UNSATURATED IRON BINDING CAPACITY: 301 UG/DL (ref 112–347)
UROBILINOGEN UR STRIP-ACNC: NORMAL
VIT B12 SERPL-MCNC: 467 PG/ML (ref 232–1245)
WBC #/AREA URNS HPF: ABNORMAL /HPF (ref 0–5)
WBC OTHER # BLD: 11.4 K/UL (ref 3.5–11.3)

## 2023-06-06 RX ORDER — ATORVASTATIN CALCIUM 20 MG/1
TABLET, FILM COATED ORAL
Qty: 30 TABLET | Refills: 2 | Status: SHIPPED | OUTPATIENT
Start: 2023-06-06

## 2023-06-07 ENCOUNTER — SCHEDULED TELEPHONE ENCOUNTER (OUTPATIENT)
Dept: GASTROENTEROLOGY | Age: 53
End: 2023-06-07
Payer: MEDICAID

## 2023-06-07 DIAGNOSIS — K59.00 CONSTIPATION, UNSPECIFIED CONSTIPATION TYPE: ICD-10-CM

## 2023-06-07 DIAGNOSIS — R13.10 DYSPHAGIA, UNSPECIFIED TYPE: Primary | ICD-10-CM

## 2023-06-07 PROCEDURE — 99442 PR PHYS/QHP TELEPHONE EVALUATION 11-20 MIN: CPT | Performed by: INTERNAL MEDICINE

## 2023-06-07 NOTE — PROGRESS NOTES
Preparedness and Response Supplemental Appropriations Act, this Virtual Visit was conducted with patient's (and/or legal guardian's) consent, to reduce the patient's risk of exposure to COVID-19 and provide necessary medical care. The patient (and/or legal guardian) has also been advised to contact this office for worsening conditions or problems, and seek emergency medical treatment and/or call 911 if deemed necessary. Services were provided through a telephone synchronous discussion virtually to substitute for in-person clinic visit. Patient and provider were located at their individual homes. --Mele Alfaro MA on 6/7/2023 at 12:58 PM    An electronic signature was used to authenticate this note.        Mele Alfaro, [address] [place] [address]   Gastroenterology

## 2023-06-11 LAB
% FREE CARBAMAZEPINE: 23.3 % (ref 8–35)
CARBAMAZEPINE, FREE: 1.4 UG/ML (ref 1–3)
CARBAMAZEPINE, TOTAL: 6 UG/ML (ref 4–12)

## 2023-06-20 ENCOUNTER — TELEPHONE (OUTPATIENT)
Dept: GASTROENTEROLOGY | Age: 53
End: 2023-06-20

## 2023-06-30 DIAGNOSIS — G57.93 NEUROPATHIC PAIN OF BOTH LEGS: ICD-10-CM

## 2023-06-30 DIAGNOSIS — K86.0 ALCOHOL-INDUCED CHRONIC PANCREATITIS (HCC): ICD-10-CM

## 2023-06-30 DIAGNOSIS — R13.19 ESOPHAGEAL DYSPHAGIA: ICD-10-CM

## 2023-06-30 RX ORDER — OMEPRAZOLE 40 MG/1
CAPSULE, DELAYED RELEASE ORAL
Qty: 56 CAPSULE | Refills: 0 | Status: SHIPPED | OUTPATIENT
Start: 2023-06-30

## 2023-06-30 RX ORDER — DULOXETIN HYDROCHLORIDE 30 MG/1
CAPSULE, DELAYED RELEASE ORAL
Qty: 28 CAPSULE | Refills: 0 | OUTPATIENT
Start: 2023-06-30

## 2023-06-30 RX ORDER — PANCRELIPASE 24000; 76000; 120000 [USP'U]/1; [USP'U]/1; [USP'U]/1
CAPSULE, DELAYED RELEASE PELLETS ORAL
Qty: 84 CAPSULE | Refills: 2 | Status: SHIPPED | OUTPATIENT
Start: 2023-06-30

## 2023-06-30 NOTE — TELEPHONE ENCOUNTER
anemia     Hypomagnesemia     Tobacco use     Ambulatory dysfunction     Seizure disorder (HCC)     COPD exacerbation (HCC)     Paresthesia of lower extremity     Acute respiratory failure with hypoxia (HCC)     Pancreatic cyst     Recurrent major depressive disorder (HCC)     Elevated CA 19-9 level     Perineal mass, female     Esophagitis candidal      Condyloma     Astrocytoma (HCC) - diagnosed at age 24, the patient underwent 2 surgical resections without known recurrence     Alcohol use disorder, severe, in early remission ([address])     Metabolic encephalopathy     AMAN (generalized anxiety disorder)     Major depressive disorder, recurrent severe without psychotic features ([address])     Esophageal dysphagia     Chronic peripheral neuropathic pain     History of alcohol abuse     History of petit-mal seizures     Intractable episodic tension-type headache     Personal history of astrocytoma     Multilevel spine pain     Chronic pain syndrome     Marijuana abuse     Severe sepsis (HCC)     Closed fracture of fifth thoracic vertebra (HCC)     Diverticulitis of large intestine with abscess without bleeding     Elevated alkaline phosphatase level     Chronic pancreatitis (HCC)     Erythema ab igne     Compression fracture of thoracic vertebra (HCC)     Pathological compression fracture of lumbar vertebra with delayed healing     Metabolic acidosis with increased anion gap and accumulation of organic acids     Pneumonia of both lungs due to infectious organism     Septicemia ([address])     Alcohol-induced acute pancreatitis     Fluid collection of pancreas     Diverticulitis of large intestine without perforation or abscess with bleeding     Pseudocyst of pancreas     Sepsis ([address])     Acute recurrent pancreatitis     Atelectasis of left lung     Hyponatremia     Iron deficiency anemia     Adenomatous polyp of sigmoid colon     Moderate episode of recurrent major depressive disorder (HCC)     Sleep disturbance     Intractable

## 2023-07-06 DIAGNOSIS — G57.93 NEUROPATHIC PAIN OF BOTH LEGS: ICD-10-CM

## 2023-07-06 RX ORDER — DULOXETIN HYDROCHLORIDE 30 MG/1
CAPSULE, DELAYED RELEASE ORAL
Qty: 28 CAPSULE | Refills: 0 | OUTPATIENT
Start: 2023-07-06

## 2023-07-10 ENCOUNTER — TELEPHONE (OUTPATIENT)
Dept: GASTROENTEROLOGY | Age: 53
End: 2023-07-10

## 2023-07-11 DIAGNOSIS — G57.93 NEUROPATHIC PAIN OF BOTH LEGS: ICD-10-CM

## 2023-07-11 RX ORDER — DULOXETIN HYDROCHLORIDE 30 MG/1
CAPSULE, DELAYED RELEASE ORAL
Qty: 28 CAPSULE | Refills: 0 | OUTPATIENT
Start: 2023-07-11

## 2023-07-11 NOTE — TELEPHONE ENCOUNTER
Pharmacy called in requesting a 28 day supply of the Duloxetine 30mg, as the patient wants to get a bubble pack and they are beginning to put that together right now. They state that the patient is more adherent to their medications when they are all put together in a bubble pack. The patient has 12 days of the medication left.

## 2023-07-12 RX ORDER — DULOXETIN HYDROCHLORIDE 30 MG/1
30 CAPSULE, DELAYED RELEASE ORAL DAILY
Qty: 28 CAPSULE | Refills: 0 | Status: SHIPPED | OUTPATIENT
Start: 2023-07-12

## 2023-08-02 DIAGNOSIS — R13.19 ESOPHAGEAL DYSPHAGIA: ICD-10-CM

## 2023-08-02 DIAGNOSIS — G57.93 NEUROPATHIC PAIN OF BOTH LEGS: ICD-10-CM

## 2023-08-02 RX ORDER — OMEPRAZOLE 40 MG/1
CAPSULE, DELAYED RELEASE ORAL
Qty: 56 CAPSULE | Refills: 0 | Status: SHIPPED | OUTPATIENT
Start: 2023-08-02

## 2023-08-02 RX ORDER — SUCRALFATE 1 G/1
TABLET ORAL
Qty: 112 TABLET | Refills: 0 | Status: SHIPPED | OUTPATIENT
Start: 2023-08-02

## 2023-08-03 NOTE — TELEPHONE ENCOUNTER
Pharmacy requesting refill of Cymbalta.       Medication active on med list yes      Date of last fill: 7/12/23  with 0 refills verified on 8/3/23  verified by NA JACOBSON      Date of last appointment 5/30/23    Next Visit Date:  8/21/2023

## 2023-08-04 RX ORDER — DULOXETIN HYDROCHLORIDE 30 MG/1
CAPSULE, DELAYED RELEASE ORAL
Qty: 28 CAPSULE | Refills: 0 | Status: SHIPPED | OUTPATIENT
Start: 2023-08-04

## 2023-08-08 ENCOUNTER — HOSPITAL ENCOUNTER (OUTPATIENT)
Age: 53
Setting detail: SPECIMEN
Discharge: HOME | End: 2023-08-08

## 2023-08-08 DIAGNOSIS — E87.6 HYPOKALEMIA: ICD-10-CM

## 2023-08-08 DIAGNOSIS — E87.1 HYPONATREMIA: ICD-10-CM

## 2023-08-08 DIAGNOSIS — N39.41 URGE INCONTINENCE OF URINE: ICD-10-CM

## 2023-08-08 DIAGNOSIS — I10 ESSENTIAL HYPERTENSION: ICD-10-CM

## 2023-08-08 LAB
ANION GAP SERPL CALCULATED.3IONS-SCNC: 13 MMOL/L (ref 9–17)
BUN SERPL-MCNC: 12 MG/DL (ref 6–20)
CALCIUM SERPL-MCNC: 9.3 MG/DL (ref 8.6–10.4)
CHLORIDE SERPL-SCNC: 102 MMOL/L (ref 98–107)
CO2 SERPL-SCNC: 23 MMOL/L (ref 20–31)
CREAT SERPL-MCNC: 0.6 MG/DL (ref 0.5–0.9)
GFR SERPL CREATININE-BSD FRML MDRD: >60 ML/MIN/1.73M2
GLUCOSE SERPL-MCNC: 73 MG/DL (ref 70–99)
OSMOLALITY SERPL: 284 MOSM/KG (ref 275–295)
POTASSIUM SERPL-SCNC: 4.3 MMOL/L (ref 3.7–5.3)
SODIUM SERPL-SCNC: 138 MMOL/L (ref 135–144)

## 2023-08-14 ENCOUNTER — HOSPITAL ENCOUNTER (OUTPATIENT)
Dept: MAMMOGRAPHY | Age: 53
Discharge: HOME | End: 2023-08-16
Payer: MEDICAID

## 2023-08-14 DIAGNOSIS — Z12.31 ENCOUNTER FOR SCREENING MAMMOGRAM FOR BREAST CANCER: ICD-10-CM

## 2023-08-14 PROCEDURE — 77063 BREAST TOMOSYNTHESIS BI: CPT

## 2023-08-21 ENCOUNTER — TELEMEDICINE (OUTPATIENT)
Dept: NEUROLOGY | Age: 53
End: 2023-08-21
Payer: MEDICAID

## 2023-08-21 DIAGNOSIS — R56.9 SEIZURE-LIKE ACTIVITY (HCC): ICD-10-CM

## 2023-08-21 DIAGNOSIS — G43.009 MIGRAINE WITHOUT AURA AND WITHOUT STATUS MIGRAINOSUS, NOT INTRACTABLE: ICD-10-CM

## 2023-08-21 DIAGNOSIS — C71.9 ASTROCYTOMA (HCC): ICD-10-CM

## 2023-08-21 DIAGNOSIS — G57.93 NEUROPATHIC PAIN OF BOTH LEGS: Primary | ICD-10-CM

## 2023-08-21 PROCEDURE — G8427 DOCREV CUR MEDS BY ELIG CLIN: HCPCS | Performed by: PSYCHIATRY & NEUROLOGY

## 2023-08-21 PROCEDURE — 99214 OFFICE O/P EST MOD 30 MIN: CPT | Performed by: PSYCHIATRY & NEUROLOGY

## 2023-08-21 PROCEDURE — 3017F COLORECTAL CA SCREEN DOC REV: CPT | Performed by: PSYCHIATRY & NEUROLOGY

## 2023-08-21 RX ORDER — DULOXETIN HYDROCHLORIDE 60 MG/1
60 CAPSULE, DELAYED RELEASE ORAL DAILY
Qty: 30 CAPSULE | Refills: 5 | Status: SHIPPED | OUTPATIENT
Start: 2023-08-21

## 2023-08-21 NOTE — PROGRESS NOTES
NEUROLOGY FOLLOW-UP VISIT   Patient Name:       Ke Pritchett  :        1970  Clinic Visit Date:    2023  LOV: 2023      Dear Dr. Jeneal Landau, MD       I saw Ms. Ke Pritchett in video visit follow-up today in continuation of neurologic care. As you know she  is a 46 y.o.  female presented for follow-up on seizure disorder and Chronic migraine headaches and astrocytoma in remote past with contrasted MRI brain on 2021 demonstrating no evidence of astrocytoma. Today she comes to clinic for follow-up on repeat EEG. Earlier this year she has had \"pseudoseizure\". Then she was advised to get EEG and follow-up afterwards. She forgot about EEG and it is scheduled for next . She was in hospital for panic attack in January and she is not sure about seizure. But still taking Topamax and Tegretol and wants to stop one of them. She's on Tegretol 200 tid x 3 yr and Topamax 25 bid. She has not had any migraine attack for > 1.5 years. But she also stated that she has pins-and-needles sensations in lower legs and feet. She could not tolerate gabapentin and pregabalin. She was started on Cymbalta 30 mg daily and it has helped in the beginning. She wants to increase the dose. Last visit on 2023:  She comes to the clinic stating that she hasn't had any migraine attacks in March and April and also had concerns regarding antisz meds reporting \" I'm still taking Topamax and Tegretol every day\"   She also brought a personal video regarding an event which occurred in  while \" I was still drinking\" and it was suggestive of shaking activity with no impaired consciousness and no tongue bite and no bladder incontinence. Patient had another episode in beginning of this year and patient was at [facility] and it was attributed to likely panic attack vs hypoxia. She has not stopped topamax and tegretol. Her question is \"can I stop antisz meds\".   Also has concerns regarding

## 2023-08-30 DIAGNOSIS — R13.19 ESOPHAGEAL DYSPHAGIA: ICD-10-CM

## 2023-08-30 RX ORDER — OMEPRAZOLE 40 MG/1
CAPSULE, DELAYED RELEASE ORAL
Qty: 56 CAPSULE | Refills: 0 | Status: SHIPPED | OUTPATIENT
Start: 2023-08-30

## 2023-08-30 RX ORDER — SUCRALFATE 1 G/1
TABLET ORAL
Qty: 112 TABLET | Refills: 0 | Status: SHIPPED | OUTPATIENT
Start: 2023-08-30

## 2023-08-30 NOTE — TELEPHONE ENCOUNTER
Pharmacy requesting refill of Atorvastatin.       Medication active on med list yes      Date of last fill: 6/6/23  verified on 8/30/2023   verified by [facility] LPN      Date of last appointment 8/21/23    Next Visit Date:  Visit date not found

## 2023-09-01 RX ORDER — ATORVASTATIN CALCIUM 20 MG/1
TABLET, FILM COATED ORAL
Qty: 28 TABLET | Refills: 3 | Status: SHIPPED | OUTPATIENT
Start: 2023-09-01

## 2023-09-06 NOTE — PROGRESS NOTES
Alcohol withdrawal syndrome, with delirium ([address]) 12/14/2019    Alcoholism ([address])     Anal warts     Anemia 10/2020    GI bleed    Anxiety     Astrocytoma ([address]) - diagnosed at age 24, the patient underwent 2 surgical resections without known recurrence 10/23/2020    at age 24    Bandemia     Closed fracture of lateral portion of left tibial plateau 46/25/9354    Condyloma     COPD (chronic obstructive pulmonary disease) ([address])     CO2 retainer, on Bipap at night for this, Dr. Fabiola Cancino ( last visit 11/20/2020 and note on chart )    Depression      major depressive disorder, ptsd, anxiety    Dysphagia     GI bleed 10/2020    Hypertension     Memory loss     Oxygen dependent     USES  3L/MIN DAILY PRN AND NIGHTLY CONTINUOUSLY    Pain, joint, ankle and foot     Pancreatic lesion 10/2020    Dr. Misty Boone working up pt    Perianal wart     Peripheral neuropathy     Seizures ([address])     LAST SEIZURE 3/2020 - FEELS IT WAS FROM ALCOHOL WITHDRAWL    Tension headache     Under care of team 09/29/2021    neuro-Dr Coyle-[facility] ct-last visit sep 2021    Under care of team 09/29/2021    pain management-Hamzah Martines-[facility]-last visit sep 2021    Under care of team     pulmonology-Dr Dueñas-[facility]-due for visit March 2022    Under care of team 09/29/2021    psych-bahnfeldt NP-telemed-last visit 10/ 2021    Under care of team 09/29/2021    fs-[facility]-last visit aug 2021    Under care of team     NEPHROLOGY - DR. LEE    Wears glasses     Wears partial dentures     UPPER    Wellness examination     pcp-Ludivina Grimm-[facility] cornelio-last visit Jan 5, 2022     Past Surgical History:   Procedure Laterality Date    ANUS SURGERY N/A 01/25/2022    EXCISION AND FULGURATION, ANAL, VAGINAL AND PERIANAL WARTS WITH CO2 LASER, FORTEC performed by Refugio Spain MD at [address] [place] [address]    astrocytoma times 2    COLONOSCOPY N/A 10/22/2020    COLONOSCOPY DIAGNOSTIC performed by Mat Min MD at [facility]

## 2023-09-11 ENCOUNTER — HOSPITAL ENCOUNTER (OUTPATIENT)
Dept: NEUROLOGY | Age: 53
Discharge: HOME | End: 2023-09-11
Attending: PSYCHIATRY & NEUROLOGY
Payer: MEDICAID

## 2023-09-11 ENCOUNTER — OFFICE VISIT (OUTPATIENT)
Dept: PULMONOLOGY | Age: 53
End: 2023-09-11
Payer: MEDICAID

## 2023-09-11 VITALS
SYSTOLIC BLOOD PRESSURE: 111 MMHG | RESPIRATION RATE: 14 BRPM | DIASTOLIC BLOOD PRESSURE: 74 MMHG | HEIGHT: 65 IN | WEIGHT: 130 LBS | BODY MASS INDEX: 21.66 KG/M2 | HEART RATE: 74 BPM | OXYGEN SATURATION: 97 %

## 2023-09-11 DIAGNOSIS — R56.9 SEIZURE-LIKE ACTIVITY (HCC): ICD-10-CM

## 2023-09-11 DIAGNOSIS — J96.11 CHRONIC RESPIRATORY FAILURE WITH HYPOXIA AND HYPERCAPNIA (HCC): ICD-10-CM

## 2023-09-11 DIAGNOSIS — J96.12 CHRONIC RESPIRATORY FAILURE WITH HYPOXIA AND HYPERCAPNIA (HCC): ICD-10-CM

## 2023-09-11 DIAGNOSIS — Z87.891 PERSONAL HISTORY OF TOBACCO USE: Primary | ICD-10-CM

## 2023-09-11 DIAGNOSIS — J44.0 CHRONIC OBSTRUCTIVE PULMONARY DISEASE WITH ACUTE LOWER RESPIRATORY INFECTION (HCC): ICD-10-CM

## 2023-09-11 DIAGNOSIS — G25.81 RESTLESS LEG: ICD-10-CM

## 2023-09-11 PROCEDURE — G0296 VISIT TO DETERM LDCT ELIG: HCPCS | Performed by: NURSE PRACTITIONER

## 2023-09-11 PROCEDURE — 3023F SPIROM DOC REV: CPT | Performed by: NURSE PRACTITIONER

## 2023-09-11 PROCEDURE — 95813 EEG EXTND MNTR 61-119 MIN: CPT

## 2023-09-11 PROCEDURE — 99214 OFFICE O/P EST MOD 30 MIN: CPT | Performed by: NURSE PRACTITIONER

## 2023-09-11 PROCEDURE — 3017F COLORECTAL CA SCREEN DOC REV: CPT | Performed by: NURSE PRACTITIONER

## 2023-09-11 PROCEDURE — 4004F PT TOBACCO SCREEN RCVD TLK: CPT | Performed by: NURSE PRACTITIONER

## 2023-09-11 PROCEDURE — 95813 EEG EXTND MNTR 61-119 MIN: CPT | Performed by: PSYCHIATRY & NEUROLOGY

## 2023-09-11 PROCEDURE — 3074F SYST BP LT 130 MM HG: CPT | Performed by: NURSE PRACTITIONER

## 2023-09-11 PROCEDURE — G8420 CALC BMI NORM PARAMETERS: HCPCS | Performed by: NURSE PRACTITIONER

## 2023-09-11 PROCEDURE — G8427 DOCREV CUR MEDS BY ELIG CLIN: HCPCS | Performed by: NURSE PRACTITIONER

## 2023-09-11 PROCEDURE — 3078F DIAST BP <80 MM HG: CPT | Performed by: NURSE PRACTITIONER

## 2023-09-11 RX ORDER — NICOTINE 21 MG/24HR
1 PATCH, TRANSDERMAL 24 HOURS TRANSDERMAL DAILY
Qty: 42 PATCH | Refills: 0 | Status: SHIPPED | OUTPATIENT
Start: 2023-09-11 | End: 2023-10-23

## 2023-09-11 RX ORDER — FLUTICASONE PROPIONATE 50 MCG
2 SPRAY, SUSPENSION (ML) NASAL DAILY
Qty: 16 G | Refills: 2 | Status: SHIPPED | OUTPATIENT
Start: 2023-09-11

## 2023-09-11 ASSESSMENT — ENCOUNTER SYMPTOMS
EYES NEGATIVE: 1
GASTROINTESTINAL NEGATIVE: 1
ALLERGIC/IMMUNOLOGIC NEGATIVE: 1

## 2023-09-12 NOTE — PROCEDURES
[address]                          ELECTROENCEPHALOGRAM REPORT    PATIENT NAME: Yane Calderón                  :        1970  MED REC NO:   [id number]                             ROOM:  ACCOUNT NO:   [de-identified]                           ADMIT DATE: 2023  PROVIDER:     Lanie Coyle    DATE OF EE2023    HISTORY:  This is a 69-year-old female with history of seizure disorder. He is being evaluated for breakthrough spells. MEDICATIONS:  Include Tegretol, Topamax, duloxetine and Abilify. DESCRIPTION OF PROCEDURE:  Electrodes were applied using paste in  positions dictated by the International 10-20 System of placement. Reviewing montages included both referential and bipolar derivations. In addition to EEG data, EKG, eye movements, hyperventilation and photic  stimulation were performed. This is a prolonged EEG for greater than  one hour from 1329 to 1459. DESCRIPTION OF ACTIVITIES:  At the onset of the study, the patient is  awake and drowsy with background demonstrating 8-9 Hz, 40-60 microvolts  alpha activity which attenuated symmetrically with eye opening. Low-voltage, high-frequency beta activity noted occurring in bilateral  anterior head regions. As the study progresses, the patient is drowsy  and asleep with vertex sharp waves and K-complexes occurring  symmetrically. This study is also significant for infrequent left mid  temporal phase reversals with epileptiform potential.  These did not  evolve into rhythmic electrographic seizures. Electrocardiogram montage  revealed normal sinus rhythm.     ELECTRODIAGNOSTIC INTERPRETATION:  This EEG performed greater than one  hour duration; performed during wakefulness and sleep is abnormal with  infrequent left mid temporal phase reversals with epileptiform  potential.  These do not increase risk for focal onset

## 2023-09-28 ENCOUNTER — PATIENT MESSAGE (OUTPATIENT)
Dept: FAMILY MEDICINE CLINIC | Age: 53
End: 2023-09-28

## 2023-09-28 DIAGNOSIS — E55.9 VITAMIN D DEFICIENCY: ICD-10-CM

## 2023-09-28 DIAGNOSIS — K86.0 ALCOHOL-INDUCED CHRONIC PANCREATITIS (HCC): ICD-10-CM

## 2023-09-28 DIAGNOSIS — R13.19 ESOPHAGEAL DYSPHAGIA: ICD-10-CM

## 2023-09-28 DIAGNOSIS — I10 ESSENTIAL HYPERTENSION: ICD-10-CM

## 2023-09-28 DIAGNOSIS — K31.84 GASTROPARESIS: ICD-10-CM

## 2023-09-28 RX ORDER — SUCRALFATE 1 G/1
TABLET ORAL
Qty: 112 TABLET | Refills: 0 | Status: SHIPPED | OUTPATIENT
Start: 2023-09-28

## 2023-09-28 RX ORDER — PANCRELIPASE 24000; 76000; 120000 [USP'U]/1; [USP'U]/1; [USP'U]/1
CAPSULE, DELAYED RELEASE PELLETS ORAL
Qty: 90 CAPSULE | Refills: 0 | Status: SHIPPED | OUTPATIENT
Start: 2023-09-28

## 2023-09-28 RX ORDER — PANCRELIPASE 24000; 76000; 120000 [USP'U]/1; [USP'U]/1; [USP'U]/1
CAPSULE, DELAYED RELEASE PELLETS ORAL
Qty: 84 CAPSULE | Refills: 0 | OUTPATIENT
Start: 2023-09-28

## 2023-09-28 RX ORDER — ERGOCALCIFEROL 1.25 MG/1
CAPSULE ORAL
Qty: 4 CAPSULE | Refills: 0 | Status: SHIPPED | OUTPATIENT
Start: 2023-09-28

## 2023-09-28 RX ORDER — AMLODIPINE BESYLATE 10 MG/1
TABLET ORAL
Qty: 30 TABLET | Refills: 0 | Status: SHIPPED | OUTPATIENT
Start: 2023-09-28

## 2023-09-28 RX ORDER — OMEPRAZOLE 40 MG/1
CAPSULE, DELAYED RELEASE ORAL
Qty: 56 CAPSULE | Refills: 0 | Status: SHIPPED | OUTPATIENT
Start: 2023-09-28

## 2023-09-28 RX ORDER — METOCLOPRAMIDE 5 MG/1
TABLET ORAL
Qty: 112 TABLET | Refills: 0 | Status: SHIPPED | OUTPATIENT
Start: 2023-09-28

## 2023-09-28 NOTE — TELEPHONE ENCOUNTER
Last visit: 3/7/23  Last Med refill: 6/30/23, 3/29/23  Does patient have enough medication for 72 hours: No:     Patient needs appt-will send [employer] message    Next Visit Date:  Future Appointments   Date Time Provider [address]   12/12/2023  1:30 PM LOI Johnson - CNP [facility] Neph [facility] None   1/2/2024 11:30 AM STV CT ROOM 1 [facility] CT SCAN [facility] Radiolog   2/6/2024  1:45 PM Calli Traore MD Resp Spec [address] Maintenance   Topic Date Due    Hepatitis B vaccine (1 of 3 - 3-dose series) Never done    COVID-19 Vaccine (5 - Pfizer series) 08/04/2022    Flu vaccine (1) 08/01/2023    Lipids  09/01/2023    Low dose CT lung screening &/or counseling  11/09/2023    Depression Monitoring  03/07/2024    Breast cancer screen  08/14/2025    DTaP/Tdap/Td vaccine (2 - Td or Tdap) 12/28/2030    Colorectal Cancer Screen  11/19/2031    Shingles vaccine  Completed    Pneumococcal 0-64 years Vaccine  Completed    Hepatitis C screen  Completed    HIV screen  Completed    Hepatitis A vaccine  Aged Out    Hib vaccine  Aged Out    Meningococcal (ACWY) vaccine  Aged Out       Hemoglobin A1C (%)   Date Value   04/13/2021 5.5   07/14/2019 4.3             ( goal A1C is < 7)   No components found for: \"LABMICR\"  LDL Cholesterol (mg/dL)   Date Value   09/01/2022 176 (H)   12/23/2020 134 (H)       (goal LDL is <100)   AST (U/L)   Date Value   06/06/2023 14     ALT (U/L)   Date Value   06/06/2023 <5 (L)     BUN (mg/dL)   Date Value   08/08/2023 12     BP Readings from Last 3 Encounters:   09/11/23 111/74   08/02/23 (!) 140/85   06/13/23 107/71          (goal 120/80)    All Future Testing planned in [facility]  Lab Frequency Next Occurrence   EGD Once 07/07/2023   COLONOSCOPY (Diagnostic) Once 06/21/2023   Osmolality, Urine Once 08/03/2023   Sodium, Urine, Random Once 08/03/2023   CT Lung Screen (Initial/Annual/Baseline) Once 01/02/2024               Patient Active Problem List:     Acute bronchitis     Reflex sympathetic

## 2023-10-04 ENCOUNTER — HOSPITAL ENCOUNTER (OUTPATIENT)
Age: 53
Setting detail: SPECIMEN
Discharge: HOME | End: 2023-10-04

## 2023-10-04 ENCOUNTER — OFFICE VISIT (OUTPATIENT)
Dept: FAMILY MEDICINE CLINIC | Age: 53
End: 2023-10-04
Payer: MEDICAID

## 2023-10-04 VITALS
DIASTOLIC BLOOD PRESSURE: 74 MMHG | BODY MASS INDEX: 21.63 KG/M2 | OXYGEN SATURATION: 98 % | WEIGHT: 130 LBS | HEART RATE: 76 BPM | TEMPERATURE: 98.6 F | SYSTOLIC BLOOD PRESSURE: 112 MMHG

## 2023-10-04 DIAGNOSIS — Z13.220 SCREENING, LIPID: ICD-10-CM

## 2023-10-04 DIAGNOSIS — I10 ESSENTIAL HYPERTENSION: ICD-10-CM

## 2023-10-04 DIAGNOSIS — F33.1 MODERATE EPISODE OF RECURRENT MAJOR DEPRESSIVE DISORDER (HCC): Primary | ICD-10-CM

## 2023-10-04 DIAGNOSIS — G62.1 ALCOHOLIC PERIPHERAL NEUROPATHY (HCC): ICD-10-CM

## 2023-10-04 DIAGNOSIS — C71.9 ASTROCYTOMA (HCC): ICD-10-CM

## 2023-10-04 DIAGNOSIS — R79.89 LFT ELEVATION: ICD-10-CM

## 2023-10-04 DIAGNOSIS — G40.909 SEIZURE DISORDER (HCC): ICD-10-CM

## 2023-10-04 DIAGNOSIS — K86.0 ALCOHOL-INDUCED CHRONIC PANCREATITIS (HCC): ICD-10-CM

## 2023-10-04 DIAGNOSIS — N39.41 URGE INCONTINENCE OF URINE: ICD-10-CM

## 2023-10-04 DIAGNOSIS — E87.6 HYPOKALEMIA: ICD-10-CM

## 2023-10-04 DIAGNOSIS — E87.1 HYPONATREMIA: ICD-10-CM

## 2023-10-04 DIAGNOSIS — F33.2 MAJOR DEPRESSIVE DISORDER, RECURRENT SEVERE WITHOUT PSYCHOTIC FEATURES (HCC): ICD-10-CM

## 2023-10-04 LAB
ANION GAP SERPL CALCULATED.3IONS-SCNC: 14 MMOL/L (ref 9–17)
BUN SERPL-MCNC: 10 MG/DL (ref 6–20)
CALCIUM SERPL-MCNC: 9.2 MG/DL (ref 8.6–10.4)
CHLORIDE SERPL-SCNC: 100 MMOL/L (ref 98–107)
CHOLEST SERPL-MCNC: 171 MG/DL
CHOLESTEROL/HDL RATIO: 4.6
CO2 SERPL-SCNC: 23 MMOL/L (ref 20–31)
CREAT SERPL-MCNC: 0.5 MG/DL (ref 0.5–0.9)
GFR SERPL CREATININE-BSD FRML MDRD: >60 ML/MIN/1.73M2
GLUCOSE SERPL-MCNC: 71 MG/DL (ref 70–99)
HDLC SERPL-MCNC: 37 MG/DL
LDLC SERPL CALC-MCNC: 119 MG/DL (ref 0–130)
OSMOLALITY UR: 459 MOSM/KG (ref 80–1300)
POTASSIUM SERPL-SCNC: 4.9 MMOL/L (ref 3.7–5.3)
SODIUM SERPL-SCNC: 137 MMOL/L (ref 135–144)
SODIUM UR-SCNC: 102 MMOL/L
TRIGL SERPL-MCNC: 75 MG/DL

## 2023-10-04 PROCEDURE — 99214 OFFICE O/P EST MOD 30 MIN: CPT | Performed by: INTERNAL MEDICINE

## 2023-10-04 PROCEDURE — 3078F DIAST BP <80 MM HG: CPT | Performed by: INTERNAL MEDICINE

## 2023-10-04 PROCEDURE — 3074F SYST BP LT 130 MM HG: CPT | Performed by: INTERNAL MEDICINE

## 2023-10-04 PROCEDURE — 4004F PT TOBACCO SCREEN RCVD TLK: CPT | Performed by: INTERNAL MEDICINE

## 2023-10-04 PROCEDURE — 3017F COLORECTAL CA SCREEN DOC REV: CPT | Performed by: INTERNAL MEDICINE

## 2023-10-04 PROCEDURE — G8427 DOCREV CUR MEDS BY ELIG CLIN: HCPCS | Performed by: INTERNAL MEDICINE

## 2023-10-04 PROCEDURE — G8420 CALC BMI NORM PARAMETERS: HCPCS | Performed by: INTERNAL MEDICINE

## 2023-10-04 PROCEDURE — G8484 FLU IMMUNIZE NO ADMIN: HCPCS | Performed by: INTERNAL MEDICINE

## 2023-10-04 RX ORDER — TRAZODONE HYDROCHLORIDE 50 MG/1
50 TABLET ORAL NIGHTLY PRN
COMMUNITY
Start: 2023-09-21

## 2023-10-04 RX ORDER — HYDROXYZINE 50 MG/1
50 TABLET, FILM COATED ORAL NIGHTLY PRN
COMMUNITY
Start: 2023-09-21

## 2023-10-04 ASSESSMENT — ENCOUNTER SYMPTOMS
ANAL BLEEDING: 0
CHEST TIGHTNESS: 0
CONSTIPATION: 0
CHOKING: 0
NAUSEA: 0
ABDOMINAL PAIN: 0
BLOOD IN STOOL: 0
DIARRHEA: 0
VOMITING: 0
WHEEZING: 0
COUGH: 0
SHORTNESS OF BREATH: 0

## 2023-10-04 ASSESSMENT — VISUAL ACUITY: OU: 1

## 2023-10-04 NOTE — PROGRESS NOTES
[facility]  [facility] [address] [facility] [address]  Dept: [phone]  Dept Fax: [phone]      Seth Zamora is a 46 y.o. female who presents today for hermedical conditions/complaints as noted below. Seth Zamora is c/o of Medication Refill        Assessment/Plan:     1. Moderate episode of recurrent major depressive disorder ([address])  2. Major depressive disorder, recurrent severe without psychotic features ([address])  3. Alcohol-induced chronic pancreatitis ([address])  4. Astrocytoma ([address]) - diagnosed at age 24, the patient underwent 2 surgical resections without known recurrence  5. Seizure disorder ([address])  6. Alcoholic peripheral neuropathy ([address])  7. Screening, lipid  -     Lipid, Fasting; Future  8. LFT elevation  -     Lipid, Fasting; Future          No follow-ups on file. HPI     Has been feeling, feels her meds are finally right   Has been working on her house   Breathing has been good, following with pulm. Remains on oxygen at night, supposed to use it when she is at the grocery store or leaving the house, forgot to use it today   No alcohol, remains sober now for at least 2 years, might be more but she is not sure. Has been around alcohol and people drinking and she has not even been tempted to drink   Getting nicotine patches with next med delivery, has been ordered   Colonoscopy coming up - will be seeing Dr David Mathias   She is also following with neurology   Following with psych at [facility].          BP Readings from Last 3 Encounters:   10/04/23 112/74   09/11/23 111/74   08/02/23 (!) 140/85              Past Medical History:   Diagnosis Date    Acute respiratory failure with hypoxia ([address]) 10/16/2020    Alcohol withdrawal syndrome, with delirium ([address]) 12/14/2019    Alcoholism ([address])     Anal warts     Anemia 10/2020    GI bleed    Anxiety     Astrocytoma ([address]) - diagnosed at age 24, the patient underwent 2 surgical resections without known

## 2023-10-05 NOTE — RESULT ENCOUNTER NOTE
Cholesterol slightly worsened from prior due to HDL drop - should improve as she continues to get physically active.  Continue current medicaiton, please notify     The 10-year ASCVD risk score (Art EVANGELISTA, et al., 2019) is: 5.8%    Values used to calculate the score:      Age: 46 years      Sex: Female      Is Non- : No      Diabetic: No      Tobacco smoker: Yes      Systolic Blood Pressure: 986 mmHg      Is BP treated: Yes      HDL Cholesterol: 37 mg/dL      Total Cholesterol: 171 mg/dL

## 2023-10-10 ENCOUNTER — TELEPHONE (OUTPATIENT)
Dept: FAMILY MEDICINE CLINIC | Age: 53
End: 2023-10-10

## 2023-10-10 ENCOUNTER — TELEPHONE (OUTPATIENT)
Dept: GASTROENTEROLOGY | Age: 53
End: 2023-10-10

## 2023-10-10 DIAGNOSIS — M54.50 ACUTE LOW BACK PAIN WITHOUT SCIATICA, UNSPECIFIED BACK PAIN LATERALITY: Primary | ICD-10-CM

## 2023-10-10 RX ORDER — TRAMADOL HYDROCHLORIDE 50 MG/1
50 TABLET ORAL EVERY 8 HOURS PRN
Qty: 20 TABLET | Refills: 0 | Status: SHIPPED | OUTPATIENT
Start: 2023-10-10 | End: 2023-10-17

## 2023-10-10 RX ORDER — POLYETHYLENE GLYCOL 3350 17 G/17G
POWDER, FOR SOLUTION ORAL
Qty: 2 EACH | Refills: 0 | Status: SHIPPED | OUTPATIENT
Start: 2023-10-10

## 2023-10-10 RX ORDER — BISACODYL 5 MG/1
TABLET, DELAYED RELEASE ORAL
Qty: 4 TABLET | Refills: 0 | Status: SHIPPED | OUTPATIENT
Start: 2023-10-10

## 2023-10-10 RX ORDER — POLYETHYLENE GLYCOL 3350 17 G/17G
POWDER, FOR SOLUTION ORAL
Qty: 238 G | Refills: 0 | Status: SHIPPED | OUTPATIENT
Start: 2023-10-10

## 2023-10-10 NOTE — TELEPHONE ENCOUNTER
Colonoscopy/Manjeet Corral 12/4/23 @ 10:30am    Verbal instructions given via phone  Written mailed    Miralax/dulcolax  Extended 2 day prep

## 2023-10-10 NOTE — TELEPHONE ENCOUNTER
Patient is calling to request something for back pain. She states this past week-end she put in a kitchen back splash and some how tweeted/hurt her back.     PLEASE ADVISE

## 2023-10-26 DIAGNOSIS — K86.0 ALCOHOL-INDUCED CHRONIC PANCREATITIS (HCC): ICD-10-CM

## 2023-10-26 DIAGNOSIS — R13.19 ESOPHAGEAL DYSPHAGIA: ICD-10-CM

## 2023-10-26 DIAGNOSIS — E55.9 VITAMIN D DEFICIENCY: ICD-10-CM

## 2023-10-26 DIAGNOSIS — K31.84 GASTROPARESIS: ICD-10-CM

## 2023-10-26 DIAGNOSIS — I10 ESSENTIAL HYPERTENSION: ICD-10-CM

## 2023-10-26 RX ORDER — PANCRELIPASE 24000; 76000; 120000 [USP'U]/1; [USP'U]/1; [USP'U]/1
CAPSULE, DELAYED RELEASE PELLETS ORAL
Qty: 84 CAPSULE | Refills: 1 | Status: SHIPPED | OUTPATIENT
Start: 2023-10-26 | End: 2023-12-20

## 2023-10-26 RX ORDER — ERGOCALCIFEROL 1.25 MG/1
CAPSULE ORAL
Qty: 4 CAPSULE | Refills: 1 | Status: SHIPPED | OUTPATIENT
Start: 2023-10-26 | End: 2023-12-20

## 2023-10-26 RX ORDER — SUCRALFATE 1 G/1
TABLET ORAL
Qty: 112 TABLET | Refills: 0 | Status: SHIPPED | OUTPATIENT
Start: 2023-10-26

## 2023-10-26 RX ORDER — OMEPRAZOLE 40 MG/1
CAPSULE, DELAYED RELEASE ORAL
Qty: 56 CAPSULE | Refills: 0 | Status: SHIPPED | OUTPATIENT
Start: 2023-10-26

## 2023-10-26 RX ORDER — AMLODIPINE BESYLATE 10 MG/1
TABLET ORAL
Qty: 28 TABLET | Refills: 1 | Status: SHIPPED | OUTPATIENT
Start: 2023-10-26 | End: 2023-12-20

## 2023-10-26 RX ORDER — METOCLOPRAMIDE 5 MG/1
TABLET ORAL
Qty: 112 TABLET | Refills: 0 | Status: SHIPPED | OUTPATIENT
Start: 2023-10-26

## 2023-10-26 NOTE — TELEPHONE ENCOUNTER
Ke Pritchett is calling to request a refill on the following medication(s):    Medication Request:  Requested Prescriptions     Pending Prescriptions Disp Refills    lipase-protease-amylase (CREON) 59174-90078 units delayed release capsule [Pharmacy Med Name: CREON DR 24,000 UNITS CAPSULE] 84 capsule 0     Sig: TAKE (1) CAPSULETHREE TIMES DAILY WITH A MEAL    amLODIPine (NORVASC) 10 MG tablet [Pharmacy Med Name: AMLODIPINE BESYLATE 10 MG TAB] 28 tablet 0     Sig: TAKE (1) TABLET EVERY MORNING    vitamin D (ERGOCALCIFEROL) 1.25 MG (17183 UT) CAPS capsule [Pharmacy Med Name: VITAMIN D2 1.25MG(50,000 UNIT)] 4 capsule 0     Sig: TAKE (1) CAPSULE ONCE A WEEK       Last Visit Date (If Applicable):  82/1/6115    Next Visit Date:    4/4/2024

## 2023-11-02 DIAGNOSIS — J42 CHRONIC BRONCHITIS, UNSPECIFIED CHRONIC BRONCHITIS TYPE (HCC): ICD-10-CM

## 2023-11-02 RX ORDER — FLUTICASONE FUROATE, UMECLIDINIUM BROMIDE AND VILANTEROL TRIFENATATE 100; 62.5; 25 UG/1; UG/1; UG/1
POWDER RESPIRATORY (INHALATION)
Qty: 30 EACH | Refills: 11 | Status: SHIPPED | OUTPATIENT
Start: 2023-11-02

## 2023-11-02 NOTE — TELEPHONE ENCOUNTER
Pt is current - due for refills. F/U scheduled with Dr Divina Montana Feb 2024   Please sign pending Rx received from pharmacy.

## 2023-11-22 DIAGNOSIS — R13.19 ESOPHAGEAL DYSPHAGIA: ICD-10-CM

## 2023-11-22 DIAGNOSIS — K31.84 GASTROPARESIS: ICD-10-CM

## 2023-11-22 RX ORDER — OMEPRAZOLE 40 MG/1
CAPSULE, DELAYED RELEASE ORAL
Qty: 56 CAPSULE | Refills: 0 | Status: SHIPPED | OUTPATIENT
Start: 2023-11-22

## 2023-11-22 RX ORDER — METOCLOPRAMIDE 5 MG/1
TABLET ORAL
Qty: 112 TABLET | Refills: 0 | Status: SHIPPED | OUTPATIENT
Start: 2023-11-22

## 2023-11-22 RX ORDER — SUCRALFATE 1 G/1
TABLET ORAL
Qty: 112 TABLET | Refills: 0 | Status: SHIPPED | OUTPATIENT
Start: 2023-11-22

## 2023-11-22 NOTE — TELEPHONE ENCOUNTER
LAST VISIT: 9/11/23  NEXT VISIT: 2/6/24 with Dr Analilia Gupta    RX's for nicotine patch 21mg, 14mg and 7mg prescribed on 9/11/23 all with no refills. Unsure if you are willing to re-prescribe. Thank you.

## 2023-12-04 ENCOUNTER — ANESTHESIA (OUTPATIENT)
Dept: OPERATING ROOM | Age: 53
End: 2023-12-04
Payer: MEDICAID

## 2023-12-04 ENCOUNTER — ANESTHESIA EVENT (OUTPATIENT)
Dept: OPERATING ROOM | Age: 53
End: 2023-12-04
Payer: MEDICAID

## 2023-12-04 ENCOUNTER — HOSPITAL ENCOUNTER (OUTPATIENT)
Age: 53
Setting detail: OUTPATIENT SURGERY
Discharge: HOME | End: 2023-12-04
Attending: INTERNAL MEDICINE | Admitting: INTERNAL MEDICINE
Payer: MEDICAID

## 2023-12-04 VITALS
HEART RATE: 72 BPM | DIASTOLIC BLOOD PRESSURE: 87 MMHG | TEMPERATURE: 98.2 F | BODY MASS INDEX: 21.66 KG/M2 | RESPIRATION RATE: 16 BRPM | OXYGEN SATURATION: 99 % | WEIGHT: 130 LBS | SYSTOLIC BLOOD PRESSURE: 126 MMHG | HEIGHT: 65 IN

## 2023-12-04 DIAGNOSIS — K59.00 CONSTIPATION, UNSPECIFIED CONSTIPATION TYPE: ICD-10-CM

## 2023-12-04 LAB
BUN BLD-MCNC: 5 MG/DL (ref 8–26)
EGFR, POC: >60 ML/MIN/1.73M2
GLUCOSE BLD-MCNC: 108 MG/DL (ref 74–100)
POC CREATININE: 0.6 MG/DL (ref 0.51–1.19)

## 2023-12-04 PROCEDURE — 3609010400 HC COLONOSCOPY POLYPECTOMY HOT BIOPSY: Performed by: INTERNAL MEDICINE

## 2023-12-04 PROCEDURE — 6360000002 HC RX W HCPCS: Performed by: NURSE ANESTHETIST, CERTIFIED REGISTERED

## 2023-12-04 PROCEDURE — 82947 ASSAY GLUCOSE BLOOD QUANT: CPT

## 2023-12-04 PROCEDURE — 2709999900 HC NON-CHARGEABLE SUPPLY: Performed by: INTERNAL MEDICINE

## 2023-12-04 PROCEDURE — 84520 ASSAY OF UREA NITROGEN: CPT

## 2023-12-04 PROCEDURE — 93005 ELECTROCARDIOGRAM TRACING: CPT | Performed by: ANESTHESIOLOGY

## 2023-12-04 PROCEDURE — 7100000011 HC PHASE II RECOVERY - ADDTL 15 MIN: Performed by: INTERNAL MEDICINE

## 2023-12-04 PROCEDURE — 2580000003 HC RX 258: Performed by: NURSE ANESTHETIST, CERTIFIED REGISTERED

## 2023-12-04 PROCEDURE — 7100000010 HC PHASE II RECOVERY - FIRST 15 MIN: Performed by: INTERNAL MEDICINE

## 2023-12-04 PROCEDURE — 88305 TISSUE EXAM BY PATHOLOGIST: CPT

## 2023-12-04 PROCEDURE — 3700000000 HC ANESTHESIA ATTENDED CARE: Performed by: INTERNAL MEDICINE

## 2023-12-04 PROCEDURE — 82565 ASSAY OF CREATININE: CPT

## 2023-12-04 PROCEDURE — 3700000001 HC ADD 15 MINUTES (ANESTHESIA): Performed by: INTERNAL MEDICINE

## 2023-12-04 PROCEDURE — 45385 COLONOSCOPY W/LESION REMOVAL: CPT | Performed by: INTERNAL MEDICINE

## 2023-12-04 RX ORDER — PROPOFOL 10 MG/ML
INJECTION, EMULSION INTRAVENOUS PRN
Status: DISCONTINUED | OUTPATIENT
Start: 2023-12-04 | End: 2023-12-04 | Stop reason: SDUPTHER

## 2023-12-04 RX ORDER — SODIUM CHLORIDE, SODIUM LACTATE, POTASSIUM CHLORIDE, CALCIUM CHLORIDE 600; 310; 30; 20 MG/100ML; MG/100ML; MG/100ML; MG/100ML
INJECTION, SOLUTION INTRAVENOUS CONTINUOUS PRN
Status: DISCONTINUED | OUTPATIENT
Start: 2023-12-04 | End: 2023-12-04 | Stop reason: SDUPTHER

## 2023-12-04 RX ORDER — PROPOFOL 10 MG/ML
INJECTION, EMULSION INTRAVENOUS CONTINUOUS PRN
Status: DISCONTINUED | OUTPATIENT
Start: 2023-12-04 | End: 2023-12-04 | Stop reason: SDUPTHER

## 2023-12-04 RX ADMIN — PROPOFOL 60 MG: 10 INJECTION, EMULSION INTRAVENOUS at 10:08

## 2023-12-04 RX ADMIN — PROPOFOL 30 MG: 10 INJECTION, EMULSION INTRAVENOUS at 10:12

## 2023-12-04 RX ADMIN — PROPOFOL 200 MCG/KG/MIN: 10 INJECTION, EMULSION INTRAVENOUS at 10:08

## 2023-12-04 RX ADMIN — SODIUM CHLORIDE, POTASSIUM CHLORIDE, SODIUM LACTATE AND CALCIUM CHLORIDE: 600; 310; 30; 20 INJECTION, SOLUTION INTRAVENOUS at 10:05

## 2023-12-04 ASSESSMENT — LIFESTYLE VARIABLES: SMOKING_STATUS: 1

## 2023-12-04 ASSESSMENT — PAIN - FUNCTIONAL ASSESSMENT: PAIN_FUNCTIONAL_ASSESSMENT: NONE - DENIES PAIN

## 2023-12-04 NOTE — OP NOTE
Operative Note      Patient: Bennie Lomeli  YOB: 1970  MRN: [id number]    Date of Procedure: 12/4/2023    Pre-Op Diagnosis Codes:     * Constipation, unspecified constipation type [K59.00]    Post-Op Diagnosis: SUBOPTIMAL PREP, ascending colon polyp, mild proctitis       Procedure(s):  COLONOSCOPY POLYPECTOMY HOT BIOPSY    Surgeon(s):  Jonathan Spears MD    Assistant:   First Assistant: Claudia Virgen RN    Anesthesia: Monitor Anesthesia Care    Estimated Blood Loss (mL): Minimal    Complications: None    Specimens:   ID Type Source Tests Collected by Time Destination   A : Ascending colon polyp Tissue Colon-Ascending SURGICAL PATHOLOGY Jonathan Spears MD 12/4/2023 1019        Implants:  * No implants in log *      Drains: * No LDAs found *                  [facility] ENDOSCOPY     COLONOSCOPY    PROCEDURE DATE: 12/04/23    REFERRING PHYSICIAN: No ref. provider found     PRIMARY CARE PROVIDER: Greta Pineda MD    ATTENDING PHYSICIAN: Jonathan Spears MD     HISTORY: Ms. Bennie Lomeli is a 46 y.o. female who presents to the [facility] endoscopy unit for colonoscopy. The patient's clinical history is remarkable for anxiety, prior history of advanced colon polyps, referred for surveillance colonoscopy. She is currently medically stable and appropriate for the planned procedure. PREOPERATIVE DIAGNOSIS: previous adenomatous polyp. PROCEDURES:   Transanal Colonoscopy with polypectomy (snare cautery). POSTPROCEDURE DIAGNOSIS:    Suboptimal bowel prep-segments of transverse colon, hepatic flexure and descending colon there were occupied by semiformed stool that made examination limited    1-8 mm ascending colon polyp status post hot snare polypectomy removal  2-moderate nonspecific distal rectal erythema, moderate external hemorrhoids, small internal hemorrhoids. MEDICATIONS:     MAC per anesthesia     EBL <10cc    INSTRUMENT: Olympus CF-H190 AL Pediatric flexible Colonoscope.      PREPARATION: The nature and character of the procedure as well as risks, benefits, and alternatives were discussed with the patient and informed consent was obtained. Complications were said to include, but were not limited to: medication allergy, medication reaction, cardiovascular and respiratory problems, bleeding, perforation, infection, and/or missed diagnosis. Following arrival in the endoscopy room, the patient was placed in the left lateral decubitus position and final time-out accomplished in the presence of the nursing staff. Baseline vital signs were obtained and reviewed, and IV sedation was subsequently initiated. FINDINGS: Rectal examination demonstrated no significant visible external abnormality and digital palpation was unremarkable. Following adequate conscious sedation the colonoscope was introduced and advanced under direct visualization to the cecum, which was identified by the ileocecal valve and appendiceal orifice. The bowel preparation was felt to be SUBOPTIMAL. This included large amounts of yellow stool that was partially able to be adequately irrigated and aspirated. Cecal intubation time was 8 minutes. Once maximally inserted, the endoscope was withdrawn and the mucosa was carefully inspected. The mucosal exam revealed colon polyp. Retroflexion was performed in the rectum and hemorrhoids. Withdrawal time was 14 minutes. IMPRESSION:     Suboptimal bowel prep-segments of transverse colon, hepatic flexure and descending colon there were occupied by semiformed stool that made examination limited    1-8 mm ascending colon polyp status post hot snare polypectomy removal  2-moderate nonspecific distal rectal erythema, moderate external hemorrhoids, small internal hemorrhoids. RECOMMENDATIONS:   1) Follow up with referring provider, as previously scheduled. 2) Repeat Colonoscopy in 6- 12 months with extended bowel prep  3) Follow up path in GI clinic.         Yee Schuster MD  [facility]

## 2023-12-04 NOTE — DISCHARGE INSTRUCTIONS
[facility]    POST-ENDOSCOPY INSTRUCTIONS:    1. ACTIVITY   No driving, operating machinery, or making important decisions for 24 hours. Resume normal activity after 24 hours. You may return to work after 24 hours. 2. DIET     ____ (EGD/ERCP):  Do not eat or drink for one hour after your exam.  You may then   try a sip of water, and if you are able to swallow as usual you may advance to a   regular diet. ____ (Colonscopy/Flex Sig):  Resume your usual diet unless specified below. ____ Diet Modification:***    3. MEDICATIONS (Do not consume alcohol, tranquilizers, or sleeping medications for 24           hours unless advised by your physician)       _____ Resume your usual medications    4. PHYSICIAN FOLLOW-UP        ____ Please call the office for an appointment/further instructions. ***        ____ See your family physician. 5. ADDITIONAL INSTRUCTIONS      ***    6. NORMAL CHANGES YOU MAY EXPERIENCE AFTER ENDOSCOPY:          EGD/ERCP     COLONOSCOPY      Sore throat after EGD/ERCP   Passing of gas for several hours after      A bloated feeling and belching from  Some mild abdominal cramping   air in stomach    If a biopsy/polypectomy was done, you      If a biopsy was done, you may spit   may see some spotting of blood   up some blood tinged mucous  You may feel fatigued for the next 24-48         hours due to the prep and sedation    7. CALL YOUR PHYSICIAN IF YOU EXPERIENCE ANY OF THE FOLLOWING:      A.  Passing blood rectally or vomiting blood (color may be red or black)      B. Severe abdominal pain or tenderness (that is not relieved by passing air)      C.   Fever, chills, or excessive sweating      D.  Persistent nausea or vomiting      E.  Redness or swelling at the IV site    If you have additional questions, PLEASE call your doctor @ _______________________ or the [address] [place] [address] office at [phone]

## 2023-12-04 NOTE — H&P
History and Physical    Pt Name: Diamond Herrera  MRN: [id number]  YOB: 1970  Date of evaluation: 12/4/2023  Primary Care Physician: Héctor Underwood MD    SUBJECTIVE:   History of Chief Complaint:    Diamond Herrera is a 46 y.o. female who is scheduled today for colonoscopy, diagnostic. Patient reports she has a history of dysphagia for several months which is unchanged. She states she has also has GERD, denies any nausea, vomiting or abdominal pain. She states she used to have constipation but feels this has resolved. She denies any diarrhea or rectal bleeding. She had a previous colonoscopy in 2021 and states she was found to have polyps. Allergies  is allergic to seasonal.  Medications  Prior to Admission medications    Medication Sig Start Date End Date Taking?  Authorizing Provider   NICOTINE STEP 1 21 MG/24HR PLACE 1 PATCH ONTO SKIN DAILY 11/22/23   Sachin Parkinson MD   metoclopramide (REGLAN) 5 MG tablet TAKE (1) TABLET FOUR TIMES DAILY 11/22/23   Mimi Grimaldo MD   sucralfate (CARAFATE) 1 GM tablet TAKE (1) TABLET FOUR TIMES DAILY 11/22/23   Mimi Grimaldo MD   omeprazole (PRILOSEC) 40 MG delayed release capsule TAKE ONE CAPSULE BY MOUTH TWICE A DAY IN THE MORNING AND AT BEDTIME 11/22/23   Mimi Grimaldo MD   Rexine Nipper 842-58.4-57 MCG/ACT AEPB inhaler INHALE 1 PUFF ONCE DAILY 11/2/23   LOI Sanz CNP   lipase-protease-amylase (CREON) 98726-12198 units delayed release capsule TAKE (1) CAPSULETHREE TIMES DAILY WITH A MEAL 10/26/23   LOI Gunderson CNP   amLODIPine (NORVASC) 10 MG tablet TAKE (1) TABLET EVERY MORNING 10/26/23   LOI Gunderson CNP   vitamin D (ERGOCALCIFEROL) 1.25 MG (26384 UT) CAPS capsule TAKE (1) CAPSULE ONCE A WEEK  Patient taking differently: On Nadir 10/26/23   LOI Gunderson CNP   polyethylene glycol Arrie Domínguez) 17 GM/SCOOP powder Take as directed for extended 2 day bowel prep 10/10/23   Mimi Grimaldo MD   polyethylene glycol (GLYCOLAX) 17 GM/SCOOP powder Take as directed for extended 2 day bowel prep 10/10/23   Charlie Velasquez MD   bisacodyl 5 MG EC tablet Take as directed for bowel prep 10/10/23   Charlie Velasquez MD   sodium chloride 1 g tablet Take 1 tablet by mouth 3 times daily (with meals) 10/5/23 1/3/24  EmmanuelleLOI Chung - CNP   hydrOXYzine HCl (ATARAX) 50 MG tablet Take 1 tablet by mouth nightly as needed 9/21/23   Laisha Newby MD   traZODone (DESYREL) 50 MG tablet Take 1 tablet by mouth nightly as needed At times take 1/2 tablet (25 mg) 9/21/23   Laisha Newby MD   fluticasone (FLONASE) 50 MCG/ACT nasal spray 2 sprays by Each Nostril route daily 9/11/23   Tsai LOI Mathis CNP   atorvastatin (LIPITOR) 20 MG tablet TAKE 1 TABLET AT BEDTIME 9/1/23   Kole Holland MD   DULoxetine (CYMBALTA) 60 MG extended release capsule Take 1 capsule by mouth daily 8/21/23   Kole Holland MD   topiramate (TOPAMAX) 50 MG tablet Take 0.5 tablets by mouth 2 times daily 5/30/23   Kole Holland MD   carBAMazepine (TEGRETOL) 200 MG tablet Take 1 tablet by mouth 3 times daily 5/30/23   Kole Holland MD   albuterol sulfate HFA (VENTOLIN HFA) 108 (90 Base) MCG/ACT inhaler Inhale 2 puffs into the lungs 4 times daily as needed for Wheezing 3/29/23   Ludivina Stewart MD   denosumab (PROLIA) 60 MG/ML SOSY SC injection Inject 1 mL into the skin every 6 months 4/26/23   Ludivina Stewart MD   ARIPiprazole (ABILIFY) 5 MG tablet Take 1 tablet by mouth daily 1/30/23   Laisha Newby MD   rOPINIRole (REQUIP) 1 MG tablet Take 1 tablet by mouth nightly 1/11/23   Lien Aviles APRN - NP   solifenacin (VESICARE) 10 MG tablet Take 1 tablet by mouth daily 7/20/22   Letty Garcia MD   prazosin (MINIPRESS) 1 MG capsule Take 1 capsule by mouth nightly 5/4/22   LOI Richards NP     Past Medical History    has a past medical history of Acute respiratory

## 2023-12-06 LAB
EKG ATRIAL RATE: 75 BPM
EKG P AXIS: 41 DEGREES
EKG P-R INTERVAL: 234 MS
EKG Q-T INTERVAL: 414 MS
EKG QRS DURATION: 84 MS
EKG QTC CALCULATION (BAZETT): 462 MS
EKG R AXIS: 80 DEGREES
EKG T AXIS: 72 DEGREES
EKG VENTRICULAR RATE: 75 BPM
SURGICAL PATHOLOGY REPORT: NORMAL

## 2023-12-06 PROCEDURE — 93010 ELECTROCARDIOGRAM REPORT: CPT | Performed by: INTERNAL MEDICINE

## 2023-12-26 ENCOUNTER — TELEPHONE (OUTPATIENT)
Age: 53
End: 2023-12-26

## 2023-12-26 ENCOUNTER — TELEPHONE (OUTPATIENT)
Dept: ONCOLOGY | Age: 53
End: 2023-12-26

## 2023-12-26 NOTE — TELEPHONE ENCOUNTER
A call came in from Chely Nieto who is requesting refills of omeprazole, Creon and Carafate. Pharmacy had called her with notification that she needs an appointment in order to refill them. These medications were prescribed by Dr. Brett Paredes originally and he is no longer with [facility]. Chely Nieto also is requesting to schedule an appointment with Dr. Magnolia Mendez as he was recommended by Dr. Susanne Mares to be her new GI provider. Writer told Chely Nieto the message would be routed to Dr. Samia Chang clinical staff.

## 2023-12-27 ENCOUNTER — TELEPHONE (OUTPATIENT)
Age: 53
End: 2023-12-27

## 2023-12-27 ENCOUNTER — TELEPHONE (OUTPATIENT)
Dept: GASTROENTEROLOGY | Age: 53
End: 2023-12-27

## 2023-12-27 NOTE — TELEPHONE ENCOUNTER
Patient requesting to speak to Dr. Kristian Tyson clinical staff, pt states she needs refills and wants to speak to the staff please call [phone] [facility]/sc

## 2023-12-27 NOTE — TELEPHONE ENCOUNTER
Alana Snyder is going out of town on the morning.  She is requesting this med be filled in order to take with her, Please
hypertension     Nicotine addiction     Psychophysiological insomnia     Anxiety     Alcoholic peripheral neuropathy (HCC)     Hypokalemia     Macrocytic anemia     Hypomagnesemia     Tobacco use     Ambulatory dysfunction     Seizure disorder (HCC)     COPD exacerbation (HCC)     Paresthesia of lower extremity     Acute respiratory failure with hypoxia (HCC)     Pancreatic cyst     Recurrent major depressive disorder (HCC)     Elevated CA 19-9 level     Perineal mass, female     Esophagitis candidal      Condyloma     Astrocytoma (HCC) - diagnosed at age 24, the patient underwent 2 surgical resections without known recurrence     Alcohol use disorder, severe, in early remission ([address])     Metabolic encephalopathy     AMAN (generalized anxiety disorder)     Major depressive disorder, recurrent severe without psychotic features ([address])     Esophageal dysphagia     Chronic peripheral neuropathic pain     History of alcohol abuse     History of petit-mal seizures     Intractable episodic tension-type headache     Personal history of astrocytoma     Multilevel spine pain     Chronic pain syndrome     Marijuana abuse     Severe sepsis (HCC)     Closed fracture of fifth thoracic vertebra (HCC)     Diverticulitis of large intestine with abscess without bleeding     Elevated alkaline phosphatase level     Chronic pancreatitis (HCC)     Erythema ab igne     Compression fracture of thoracic vertebra (HCC)     Pathological compression fracture of lumbar vertebra with delayed healing     Metabolic acidosis with increased anion gap and accumulation of organic acids     Pneumonia of both lungs due to infectious organism     Septicemia ([address])     Alcohol-induced acute pancreatitis     Fluid collection of pancreas     Diverticulitis of large intestine without perforation or abscess with bleeding     Pseudocyst of pancreas     Sepsis ([address])     Acute recurrent pancreatitis     Atelectasis of left lung     Hyponatremia     Iron deficiency

## 2023-12-27 NOTE — TELEPHONE ENCOUNTER
Patient's pharmacy called , patient needs refills of REGLAN  5 mg tablets, CARAFATE 1 GM tablet, and  PRILOSEC 40 mg . She also needs an appointment with Cris Morales , seen by Dr. Saman Alvarez .

## 2023-12-28 ENCOUNTER — TELEMEDICINE (OUTPATIENT)
Dept: NEUROLOGY | Age: 53
End: 2023-12-28
Payer: MEDICAID

## 2023-12-28 DIAGNOSIS — C71.9 ASTROCYTOMA (HCC): ICD-10-CM

## 2023-12-28 DIAGNOSIS — G43.009 MIGRAINE WITHOUT AURA AND WITHOUT STATUS MIGRAINOSUS, NOT INTRACTABLE: ICD-10-CM

## 2023-12-28 DIAGNOSIS — R94.01 ABNORMAL EEG: ICD-10-CM

## 2023-12-28 DIAGNOSIS — K31.84 GASTROPARESIS: ICD-10-CM

## 2023-12-28 DIAGNOSIS — G40.909 SEIZURE DISORDER (HCC): Primary | ICD-10-CM

## 2023-12-28 DIAGNOSIS — K86.0 ALCOHOL-INDUCED CHRONIC PANCREATITIS (HCC): ICD-10-CM

## 2023-12-28 DIAGNOSIS — R13.19 ESOPHAGEAL DYSPHAGIA: ICD-10-CM

## 2023-12-28 PROCEDURE — 3017F COLORECTAL CA SCREEN DOC REV: CPT | Performed by: PSYCHIATRY & NEUROLOGY

## 2023-12-28 PROCEDURE — G8427 DOCREV CUR MEDS BY ELIG CLIN: HCPCS | Performed by: PSYCHIATRY & NEUROLOGY

## 2023-12-28 PROCEDURE — 99214 OFFICE O/P EST MOD 30 MIN: CPT | Performed by: PSYCHIATRY & NEUROLOGY

## 2023-12-28 RX ORDER — SUCRALFATE 1 G/1
TABLET ORAL
Qty: 112 TABLET | Refills: 0 | Status: SHIPPED | OUTPATIENT
Start: 2023-12-28

## 2023-12-28 RX ORDER — CARBAMAZEPINE 200 MG/1
200 TABLET ORAL 3 TIMES DAILY
Qty: 90 TABLET | Refills: 6 | Status: SHIPPED | OUTPATIENT
Start: 2023-12-28

## 2023-12-28 RX ORDER — OMEPRAZOLE 40 MG/1
CAPSULE, DELAYED RELEASE ORAL
Qty: 56 CAPSULE | Refills: 0 | Status: SHIPPED | OUTPATIENT
Start: 2023-12-28

## 2023-12-28 RX ORDER — PANCRELIPASE 24000; 76000; 120000 [USP'U]/1; [USP'U]/1; [USP'U]/1
CAPSULE, DELAYED RELEASE PELLETS ORAL
Qty: 84 CAPSULE | Refills: 0 | Status: SHIPPED | OUTPATIENT
Start: 2023-12-28

## 2023-12-28 RX ORDER — METOCLOPRAMIDE 5 MG/1
TABLET ORAL
Qty: 112 TABLET | Refills: 0 | Status: SHIPPED | OUTPATIENT
Start: 2023-12-28

## 2023-12-28 RX ORDER — ROPINIROLE 1 MG/1
1 TABLET, FILM COATED ORAL NIGHTLY
Qty: 90 TABLET | Refills: 1 | Status: SHIPPED | OUTPATIENT
Start: 2023-12-28

## 2023-12-28 RX ORDER — TOPIRAMATE 50 MG/1
25 TABLET, FILM COATED ORAL 2 TIMES DAILY
Qty: 60 TABLET | Refills: 6 | Status: SHIPPED | OUTPATIENT
Start: 2023-12-28

## 2023-12-28 NOTE — PROGRESS NOTES
Marcela Jordan MD at [address] [facility] N/A 04/05/2021    EGD BIOPSY performed by Marcela Jordan MD at [address] [facility] 09/08/2021    ENDOSCOPIC ULTRASOUND, LINEAR SCOPE performed by Ramonita Valentin MD at [address] N/A 02/09/2022    ENDOSCOPIC ULTRASOUND, EGD - GI SCHEDULED performed by Daniela López MD at [address] [facility]  02/09/2022    EGD STENT REMOVAL performed by Daniela López MD at [address] [facility] History: Tha Cope  reports that she has been smoking cigarettes. She started smoking about 30 years ago. She has a 30.3 pack-year smoking history. She has never been exposed to tobacco smoke. She has never used smokeless tobacco. She reports that she does not currently use alcohol. She reports current drug use. Frequency: 2.00 times per week. Drug: Marijuana Kip Kuster). Family History   Problem Relation Age of Onset    Other Father     Cancer Maternal Grandmother     Heart Disease Paternal Grandmother     Esophageal Cancer Maternal Aunt     Arthritis Mother      On exam: she checked bp at home and it was 140/ 90 \"little high\" and has been on \"bp meds\" and doing ok on them.      NEUROLOGIC EXAMINATION  GENERAL  Appears comfortable and in no distress   HEENT  NC/ AT   NECK  Supple    MENTAL STATUS:  Alert, oriented, intact memory, no confusion, normal speech, normal language, no hallucination or delusion   CRANIAL NERVES: II     -      PERRLA  III,IV,VI -  EOMs full, no ptosis  V     -     unable to perform  VII    -     Normal facial symmetry  VIII   -     Intact hearing  IX,X -     unable to perform  XI    -     Symmetrical shoulder shrug  XII   -     Midline tongue, no atrophy    MOTOR FUNCTION:  significant for no visible weakness in both upper and lower extremities with normal bulk and no involuntary movements, no tremor   SENSORY FUNCTION:  Unable to perform   CEREBELLAR

## 2024-01-02 DIAGNOSIS — K31.84 GASTROPARESIS: ICD-10-CM

## 2024-01-02 RX ORDER — METOCLOPRAMIDE 5 MG/1
TABLET ORAL
Qty: 112 TABLET | Refills: 0 | Status: SHIPPED | OUTPATIENT
Start: 2024-01-02

## 2024-01-02 RX ORDER — SUCRALFATE 1 G/1
TABLET ORAL
Qty: 112 TABLET | Refills: 0 | Status: SHIPPED | OUTPATIENT
Start: 2024-01-02

## 2024-01-10 ENCOUNTER — TELEPHONE (OUTPATIENT)
Dept: PULMONOLOGY | Age: 54
End: 2024-01-10

## 2024-01-10 ENCOUNTER — HOSPITAL ENCOUNTER (OUTPATIENT)
Dept: CT IMAGING | Age: 54
Discharge: HOME | End: 2024-01-12
Payer: MEDICAID

## 2024-01-10 DIAGNOSIS — R91.1 LUNG NODULE: Primary | ICD-10-CM

## 2024-01-10 DIAGNOSIS — Z87.891 PERSONAL HISTORY OF TOBACCO USE: ICD-10-CM

## 2024-01-10 PROCEDURE — 71271 CT THORAX LUNG CANCER SCR C-: CPT

## 2024-01-10 NOTE — TELEPHONE ENCOUNTER
Hope from [facility] called to verify we received the result on CT lung screen patient had done today. Results received. Please review.

## 2024-01-15 ENCOUNTER — TELEPHONE (OUTPATIENT)
Dept: ONCOLOGY | Age: 54
End: 2024-01-15

## 2024-01-18 ENCOUNTER — HOSPITAL ENCOUNTER (OUTPATIENT)
Dept: NUCLEAR MEDICINE | Age: 54
Discharge: HOME | End: 2024-01-20
Payer: MEDICAID

## 2024-01-18 DIAGNOSIS — R91.1 LUNG NODULE: ICD-10-CM

## 2024-01-18 LAB — GLUCOSE BLD-MCNC: 88 MG/DL (ref 65–105)

## 2024-01-18 PROCEDURE — 82947 ASSAY GLUCOSE BLOOD QUANT: CPT

## 2024-01-18 PROCEDURE — A9609 HC RX DIAGNOSTIC RADIOPHARMACEUTICAL: HCPCS | Performed by: NURSE PRACTITIONER

## 2024-01-18 PROCEDURE — 2580000003 HC RX 258: Performed by: NURSE PRACTITIONER

## 2024-01-18 PROCEDURE — 78815 PET IMAGE W/CT SKULL-THIGH: CPT

## 2024-01-18 PROCEDURE — 3430000000 HC RX DIAGNOSTIC RADIOPHARMACEUTICAL: Performed by: NURSE PRACTITIONER

## 2024-01-18 RX ORDER — FLUDEOXYGLUCOSE F 18 200 MCI/ML
10 INJECTION, SOLUTION INTRAVENOUS
Status: COMPLETED | OUTPATIENT
Start: 2024-01-18 | End: 2024-01-18

## 2024-01-18 RX ORDER — SODIUM CHLORIDE 0.9 % (FLUSH) 0.9 %
10 SYRINGE (ML) INJECTION ONCE
Status: COMPLETED | OUTPATIENT
Start: 2024-01-18 | End: 2024-01-18

## 2024-01-18 RX ADMIN — FLUDEOXYGLUCOSE F 18 10.88 MILLICURIE: 200 INJECTION, SOLUTION INTRAVENOUS at 08:12

## 2024-01-18 RX ADMIN — SODIUM CHLORIDE, PRESERVATIVE FREE 10 ML: 5 INJECTION INTRAVENOUS at 08:12

## 2024-01-23 ENCOUNTER — TELEPHONE (OUTPATIENT)
Dept: FAMILY MEDICINE CLINIC | Age: 54
End: 2024-01-23

## 2024-01-23 NOTE — TELEPHONE ENCOUNTER
Ramona from Dr Mills's office called in regards to PET results, done through [employer]  They notified Barbara of results.  A Gastro referral was placed today.    Ramona called to recommend that Barbara see PCP for follow up  I left a message for Barbara to call to schedule a earlier appt.

## 2024-01-23 NOTE — TELEPHONE ENCOUNTER
Will follow, though I think she had a gallbladder issue she was being managed by Dr. BRIAN Camacho for a year or so ago? Please verify with patient

## 2024-01-24 ENCOUNTER — OFFICE VISIT (OUTPATIENT)
Dept: FAMILY MEDICINE CLINIC | Age: 54
End: 2024-01-24
Payer: MEDICAID

## 2024-01-24 VITALS
DIASTOLIC BLOOD PRESSURE: 70 MMHG | SYSTOLIC BLOOD PRESSURE: 110 MMHG | BODY MASS INDEX: 21.63 KG/M2 | OXYGEN SATURATION: 93 % | WEIGHT: 130 LBS | HEART RATE: 88 BPM

## 2024-01-24 DIAGNOSIS — K86.0 ALCOHOL-INDUCED CHRONIC PANCREATITIS (HCC): ICD-10-CM

## 2024-01-24 DIAGNOSIS — R93.2 ABNORMAL FINDINGS ON DIAGNOSTIC IMAGING OF GALLBLADDER: Primary | ICD-10-CM

## 2024-01-24 DIAGNOSIS — I10 ESSENTIAL HYPERTENSION: ICD-10-CM

## 2024-01-24 DIAGNOSIS — E55.9 VITAMIN D DEFICIENCY: ICD-10-CM

## 2024-01-24 PROCEDURE — 4004F PT TOBACCO SCREEN RCVD TLK: CPT | Performed by: INTERNAL MEDICINE

## 2024-01-24 PROCEDURE — G8482 FLU IMMUNIZE ORDER/ADMIN: HCPCS | Performed by: INTERNAL MEDICINE

## 2024-01-24 PROCEDURE — G8427 DOCREV CUR MEDS BY ELIG CLIN: HCPCS | Performed by: INTERNAL MEDICINE

## 2024-01-24 PROCEDURE — 99214 OFFICE O/P EST MOD 30 MIN: CPT | Performed by: INTERNAL MEDICINE

## 2024-01-24 PROCEDURE — 3078F DIAST BP <80 MM HG: CPT | Performed by: INTERNAL MEDICINE

## 2024-01-24 PROCEDURE — 3017F COLORECTAL CA SCREEN DOC REV: CPT | Performed by: INTERNAL MEDICINE

## 2024-01-24 PROCEDURE — 3074F SYST BP LT 130 MM HG: CPT | Performed by: INTERNAL MEDICINE

## 2024-01-24 PROCEDURE — G8420 CALC BMI NORM PARAMETERS: HCPCS | Performed by: INTERNAL MEDICINE

## 2024-01-24 RX ORDER — ARIPIPRAZOLE 10 MG/1
10 TABLET ORAL DAILY
COMMUNITY
Start: 2023-12-27

## 2024-01-24 RX ORDER — PANCRELIPASE 24000; 76000; 120000 [USP'U]/1; [USP'U]/1; [USP'U]/1
CAPSULE, DELAYED RELEASE PELLETS ORAL
Qty: 84 CAPSULE | Refills: 0 | Status: SHIPPED | OUTPATIENT
Start: 2024-01-24

## 2024-01-24 RX ORDER — ERGOCALCIFEROL 1.25 MG/1
CAPSULE ORAL
Qty: 4 CAPSULE | Refills: 0 | Status: SHIPPED | OUTPATIENT
Start: 2024-01-24

## 2024-01-24 RX ORDER — AMLODIPINE BESYLATE 10 MG/1
TABLET ORAL
Qty: 28 TABLET | Refills: 0 | Status: SHIPPED | OUTPATIENT
Start: 2024-01-24

## 2024-01-24 ASSESSMENT — PATIENT HEALTH QUESTIONNAIRE - PHQ9
SUM OF ALL RESPONSES TO PHQ QUESTIONS 1-9: 16
8. MOVING OR SPEAKING SO SLOWLY THAT OTHER PEOPLE COULD HAVE NOTICED. OR THE OPPOSITE, BEING SO FIGETY OR RESTLESS THAT YOU HAVE BEEN MOVING AROUND A LOT MORE THAN USUAL: 0
2. FEELING DOWN, DEPRESSED OR HOPELESS: 2
SUM OF ALL RESPONSES TO PHQ QUESTIONS 1-9: 16
1. LITTLE INTEREST OR PLEASURE IN DOING THINGS: NEARLY EVERY DAY
6. FEELING BAD ABOUT YOURSELF - OR THAT YOU ARE A FAILURE OR HAVE LET YOURSELF OR YOUR FAMILY DOWN: 1
2. FEELING DOWN, DEPRESSED OR HOPELESS: MORE THAN HALF THE DAYS
8. MOVING OR SPEAKING SO SLOWLY THAT OTHER PEOPLE COULD HAVE NOTICED. OR THE OPPOSITE - BEING SO FIDGETY OR RESTLESS THAT YOU HAVE BEEN MOVING AROUND A LOT MORE THAN USUAL: NOT AT ALL
6. FEELING BAD ABOUT YOURSELF - OR THAT YOU ARE A FAILURE OR HAVE LET YOURSELF OR YOUR FAMILY DOWN: SEVERAL DAYS
7. TROUBLE CONCENTRATING ON THINGS, SUCH AS READING THE NEWSPAPER OR WATCHING TELEVISION: 3
5. POOR APPETITE OR OVEREATING: NEARLY EVERY DAY
3. TROUBLE FALLING OR STAYING ASLEEP: 1
SUM OF ALL RESPONSES TO PHQ QUESTIONS 1-9: 16
4. FEELING TIRED OR HAVING LITTLE ENERGY: NEARLY EVERY DAY
SUM OF ALL RESPONSES TO PHQ9 QUESTIONS 1 & 2: 5
10. IF YOU CHECKED OFF ANY PROBLEMS, HOW DIFFICULT HAVE THESE PROBLEMS MADE IT FOR YOU TO DO YOUR WORK, TAKE CARE OF THINGS AT HOME, OR GET ALONG WITH OTHER PEOPLE: 2
4. FEELING TIRED OR HAVING LITTLE ENERGY: 3
3. TROUBLE FALLING OR STAYING ASLEEP: SEVERAL DAYS
5. POOR APPETITE OR OVEREATING: 3
SUM OF ALL RESPONSES TO PHQ QUESTIONS 1-9: 16
1. LITTLE INTEREST OR PLEASURE IN DOING THINGS: 3
9. THOUGHTS THAT YOU WOULD BE BETTER OFF DEAD, OR OF HURTING YOURSELF: NOT AT ALL
10. IF YOU CHECKED OFF ANY PROBLEMS, HOW DIFFICULT HAVE THESE PROBLEMS MADE IT FOR YOU TO DO YOUR WORK, TAKE CARE OF THINGS AT HOME, OR GET ALONG WITH OTHER PEOPLE: VERY DIFFICULT
7. TROUBLE CONCENTRATING ON THINGS, SUCH AS READING THE NEWSPAPER OR WATCHING TELEVISION: NEARLY EVERY DAY
SUM OF ALL RESPONSES TO PHQ QUESTIONS 1-9: 16
9. THOUGHTS THAT YOU WOULD BE BETTER OFF DEAD, OR OF HURTING YOURSELF: 0

## 2024-01-24 NOTE — PROGRESS NOTES
Patient is here for a follow up  on results.  It is recommended she have further testing.  She has an appt with Gastro on 04/22/24  
S1 normal and S2 normal. No murmur heard.     No friction rub. No gallop.   Pulmonary:      Effort: Pulmonary effort is normal. No respiratory distress.      Breath sounds: Normal breath sounds. No wheezing.   Abdominal:      General: Bowel sounds are normal.      Palpations: Abdomen is soft. There is no mass.      Tenderness: There is no abdominal tenderness. There is no guarding.   Musculoskeletal:         General: Normal range of motion.   Skin:     General: Skin is warm and dry.      Capillary Refill: Capillary refill takes less than 2 seconds.   Neurological:      General: No focal deficit present.      Mental Status: She is alert and oriented to person, place, and time.           Data Review    Imaging studies in chart reviewed     Health Maintenance Due   Topic Date Due    Hepatitis B vaccine (1 of 3 - 3-dose series) Never done           Patient given educational materials- see patient instructions.  Discussed use, benefit, and side effects of prescribedmedications.  All patient questions answered.  Pt voiced understanding. Reviewedhealth maintenance.  Instructed to continue current medications, diet and exercise.Patient agreed with treatment plan. Follow up as directed.     Electronically signedby LIZBETH CAIN MD on 1/24/2024

## 2024-01-24 NOTE — TELEPHONE ENCOUNTER
dystrophy of lower limb     Essential hypertension     Nicotine addiction     Psychophysiological insomnia     Anxiety     Alcoholic peripheral neuropathy (HCC)     Hypokalemia     Macrocytic anemia     Hypomagnesemia     Tobacco use     Ambulatory dysfunction     Seizure disorder (HCC)     COPD exacerbation (HCC)     Paresthesia of lower extremity     Acute respiratory failure with hypoxia (HCC)     Pancreatic cyst     Recurrent major depressive disorder (HCC)     Elevated CA 19-9 level     Perineal mass, female     Esophagitis candidal      Condyloma     Astrocytoma (HCC) - diagnosed at age 17, the patient underwent 2 surgical resections without known recurrence     Alcohol use disorder, severe, in early remission (HCC)     Metabolic encephalopathy     AMAN (generalized anxiety disorder)     Major depressive disorder, recurrent severe without psychotic features (HCC)     Esophageal dysphagia     Chronic peripheral neuropathic pain     History of alcohol abuse     History of petit-mal seizures     Intractable episodic tension-type headache     Personal history of astrocytoma     Multilevel spine pain     Chronic pain syndrome     Marijuana abuse     Severe sepsis (HCC)     Closed fracture of fifth thoracic vertebra (HCC)     Diverticulitis of large intestine with abscess without bleeding     Elevated alkaline phosphatase level     Chronic pancreatitis (HCC)     Erythema ab igne     Compression fracture of thoracic vertebra (HCC)     Pathological compression fracture of lumbar vertebra with delayed healing     Metabolic acidosis with increased anion gap and accumulation of organic acids     Pneumonia of both lungs due to infectious organism     Septicemia (HCC)     Alcohol-induced acute pancreatitis     Fluid collection of pancreas     Diverticulitis of large intestine without perforation or abscess with bleeding     Pseudocyst of pancreas     Sepsis (HCC)     Acute recurrent pancreatitis     Atelectasis of left

## 2024-01-24 NOTE — TELEPHONE ENCOUNTER
LAST VISIT: 9/11/23  NEXT VISIT: 2/6/24 with Dr Dueñas  LAST PRESCRIBED: 12/28/23    Unsure if you are willing to prescribe. Thank you.

## 2024-01-25 ENCOUNTER — TELEPHONE (OUTPATIENT)
Age: 54
End: 2024-01-25

## 2024-01-25 NOTE — TELEPHONE ENCOUNTER
Pt pcp office called requesting the pt be seen sooner due to recent pet scan results that are in Mychart. Please contact pt to schedule sooner if available.

## 2024-01-30 ENCOUNTER — TELEPHONE (OUTPATIENT)
Age: 54
End: 2024-01-30

## 2024-01-30 DIAGNOSIS — R13.19 ESOPHAGEAL DYSPHAGIA: ICD-10-CM

## 2024-01-30 RX ORDER — OMEPRAZOLE 40 MG/1
CAPSULE, DELAYED RELEASE ORAL
Qty: 56 CAPSULE | Refills: 0 | Status: SHIPPED | OUTPATIENT
Start: 2024-01-30

## 2024-01-30 ASSESSMENT — ENCOUNTER SYMPTOMS
BLOOD IN STOOL: 0
WHEEZING: 0
CONSTIPATION: 0
COUGH: 0
CHOKING: 0
DIARRHEA: 0
VOMITING: 0
CHEST TIGHTNESS: 0
ABDOMINAL PAIN: 0
SHORTNESS OF BREATH: 0
NAUSEA: 0
ANAL BLEEDING: 0

## 2024-01-30 ASSESSMENT — VISUAL ACUITY: OU: 1

## 2024-01-30 NOTE — TELEPHONE ENCOUNTER
Pt called that has new pt appt with Aziz on 2/5/24 but will be out of her medication on 2/1/24, she is requesting a refill. Omeprazole 40mg

## 2024-02-05 ENCOUNTER — TELEPHONE (OUTPATIENT)
Age: 54
End: 2024-02-05

## 2024-02-05 ENCOUNTER — OFFICE VISIT (OUTPATIENT)
Age: 54
End: 2024-02-05
Payer: MEDICAID

## 2024-02-05 VITALS
BODY MASS INDEX: 22.36 KG/M2 | SYSTOLIC BLOOD PRESSURE: 123 MMHG | DIASTOLIC BLOOD PRESSURE: 86 MMHG | OXYGEN SATURATION: 99 % | WEIGHT: 134.2 LBS | TEMPERATURE: 97.2 F | HEIGHT: 65 IN | HEART RATE: 109 BPM

## 2024-02-05 DIAGNOSIS — K86.1 CHRONIC PANCREATITIS, UNSPECIFIED PANCREATITIS TYPE (HCC): ICD-10-CM

## 2024-02-05 DIAGNOSIS — R13.19 ESOPHAGEAL DYSPHAGIA: ICD-10-CM

## 2024-02-05 DIAGNOSIS — K31.84 GASTROPARESIS: ICD-10-CM

## 2024-02-05 DIAGNOSIS — R94.8 ABNORMAL POSITRON EMISSION TOMOGRAPHY (PET) OF COLON: ICD-10-CM

## 2024-02-05 DIAGNOSIS — Z86.010 HISTORY OF COLON POLYPS: Primary | ICD-10-CM

## 2024-02-05 PROCEDURE — 4004F PT TOBACCO SCREEN RCVD TLK: CPT | Performed by: INTERNAL MEDICINE

## 2024-02-05 PROCEDURE — 99214 OFFICE O/P EST MOD 30 MIN: CPT | Performed by: INTERNAL MEDICINE

## 2024-02-05 PROCEDURE — G8482 FLU IMMUNIZE ORDER/ADMIN: HCPCS | Performed by: INTERNAL MEDICINE

## 2024-02-05 PROCEDURE — 3017F COLORECTAL CA SCREEN DOC REV: CPT | Performed by: INTERNAL MEDICINE

## 2024-02-05 PROCEDURE — G8420 CALC BMI NORM PARAMETERS: HCPCS | Performed by: INTERNAL MEDICINE

## 2024-02-05 PROCEDURE — 3079F DIAST BP 80-89 MM HG: CPT | Performed by: INTERNAL MEDICINE

## 2024-02-05 PROCEDURE — 3074F SYST BP LT 130 MM HG: CPT | Performed by: INTERNAL MEDICINE

## 2024-02-05 PROCEDURE — G8427 DOCREV CUR MEDS BY ELIG CLIN: HCPCS | Performed by: INTERNAL MEDICINE

## 2024-02-05 RX ORDER — DULOXETIN HYDROCHLORIDE 30 MG/1
CAPSULE, DELAYED RELEASE ORAL
COMMUNITY
Start: 2024-02-01

## 2024-02-05 RX ORDER — POLYETHYLENE GLYCOL 3350, SODIUM CHLORIDE, SODIUM BICARBONATE, POTASSIUM CHLORIDE 420; 11.2; 5.72; 1.48 G/4L; G/4L; G/4L; G/4L
POWDER, FOR SOLUTION ORAL
Qty: 1 EACH | Refills: 0 | Status: SHIPPED | OUTPATIENT
Start: 2024-02-05

## 2024-02-05 RX ORDER — PRUCALOPRIDE 2 MG/1
2 TABLET, FILM COATED ORAL DAILY
Qty: 90 TABLET | Refills: 1 | Status: SHIPPED | OUTPATIENT
Start: 2024-02-05 | End: 2024-05-05

## 2024-02-05 RX ORDER — POLYETHYLENE GLYCOL 3350 17 G/17G
17 POWDER, FOR SOLUTION ORAL DAILY
Qty: 1530 G | Refills: 1 | Status: SHIPPED | OUTPATIENT
Start: 2024-02-05 | End: 2024-03-06

## 2024-02-05 RX ORDER — BISACODYL 5 MG/1
TABLET, DELAYED RELEASE ORAL
Qty: 4 TABLET | Refills: 0 | Status: SHIPPED | OUTPATIENT
Start: 2024-02-05

## 2024-02-05 ASSESSMENT — ENCOUNTER SYMPTOMS
TROUBLE SWALLOWING: 1
DIARRHEA: 0
NAUSEA: 0
COUGH: 0
ABDOMINAL PAIN: 0
ANAL BLEEDING: 0
CONSTIPATION: 0
VOMITING: 0

## 2024-02-05 NOTE — TELEPHONE ENCOUNTER
Procedure scheduled    EGD/Colonoscopy (Diagnostic)/Aziz  Dx: Esophageal dysphagia; Hx of colon polyps  Thursday 03/14/24 at 9:00 am/Arrive at 7:00 am  [facility]; Surgery Entrance, back of hospital    PAT Phone Call: pt does not have a preference on time of day    2 day bowel prep per Aziz    EGD prep/Golytely 2 day bowel prep given to pt in office. Pt instructed in office. Pt will need rx for 4 Dulcolax tablets.    [facility] per pt (they deliver)

## 2024-02-05 NOTE — TELEPHONE ENCOUNTER
PAT Phone Call: Thursday 02/29/24 at 9:30 am    Writer spoke with Diane at [facility], pt's procedure on 03/14/24 will be at 9:15 am, not 9:00 am.    Writer spoke with pt and informed her procedure start time will be at 9:15 am instead of 9:00 am. Pt said that is OK she will already be there. Writer informed pt PAT Phone Call time/date.

## 2024-02-05 NOTE — PROGRESS NOTES
reviewed per the nursing documentation.     /86 (Site: Right Upper Arm, Position: Sitting, Cuff Size: Small Adult)   Pulse (!) 109   Temp 97.2 °F (36.2 °C) (Temporal)   Ht 1.651 m (5' 5\")   Wt 60.9 kg (134 lb 3.2 oz)   SpO2 99%   BMI 22.33 kg/m²   Body mass index is 22.33 kg/m².   Physical Exam    Physical Exam   Constitutional: Patient is oriented to person, place, and time. Patient appears well-developed and well-nourished.   HENT:   Head: Normocephalic and atraumatic.   Eyes: Pupils are equal, round, and reactive to light. EOM are normal.   Neck: Normal range of motion. Neck supple. No JVD present. No tracheal deviation present. No thyromegaly present.   Cardiovascular: Normal rate, regular rhythm, normal heart sounds and intact distal pulses.   Pulmonary/Chest: Effort normal and breath sounds normal. No stridor. No respiratory distress. He has no wheezes. He has no rales. He exhibits no tenderness.   Abdominal: Soft. Bowel sounds are normal. He exhibits no distension and no mass. There is no tenderness. There is no rebound and no guarding. No hernia.   Musculoskeletal: Normal range of motion.   Lymphadenopathy:    Patient has no cervical adenopathy.   Neurological: Patient is alert and oriented to person, place, and time.   Psychiatric: Patient has a normal mood and affect. Patient behavior is normal.       LABORATORY DATA: Reviewed  Lab Results   Component Value Date    WBC 11.4 (H) 06/06/2023    HGB 13.1 06/06/2023    HCT 39.4 06/06/2023    MCV 90.0 06/06/2023     (H) 06/06/2023     10/04/2023    K 4.9 10/04/2023     10/04/2023    CO2 23 10/04/2023    BUN 10 10/04/2023    CREATININE 0.6 12/04/2023    LABALBU 4.0 06/06/2023    BILITOT 0.2 (L) 06/06/2023    ALKPHOS 164 (H) 06/06/2023    AST 14 06/06/2023    ALT <5 (L) 06/06/2023    INR 1.0 01/01/2023         Lab Results   Component Value Date    RBC 4.38 06/06/2023    HGB 13.1 06/06/2023    MCV 90.0 06/06/2023    MCH 29.9

## 2024-02-06 ENCOUNTER — OFFICE VISIT (OUTPATIENT)
Dept: PULMONOLOGY | Age: 54
End: 2024-02-06
Payer: MEDICAID

## 2024-02-06 VITALS
RESPIRATION RATE: 18 BRPM | HEART RATE: 81 BPM | HEIGHT: 65 IN | OXYGEN SATURATION: 98 % | BODY MASS INDEX: 22.33 KG/M2 | SYSTOLIC BLOOD PRESSURE: 106 MMHG | WEIGHT: 134 LBS | TEMPERATURE: 97 F | DIASTOLIC BLOOD PRESSURE: 76 MMHG

## 2024-02-06 DIAGNOSIS — R91.1 LUNG NODULE: Primary | ICD-10-CM

## 2024-02-06 PROCEDURE — G8427 DOCREV CUR MEDS BY ELIG CLIN: HCPCS | Performed by: INTERNAL MEDICINE

## 2024-02-06 PROCEDURE — G8482 FLU IMMUNIZE ORDER/ADMIN: HCPCS | Performed by: INTERNAL MEDICINE

## 2024-02-06 PROCEDURE — 3074F SYST BP LT 130 MM HG: CPT | Performed by: INTERNAL MEDICINE

## 2024-02-06 PROCEDURE — G8420 CALC BMI NORM PARAMETERS: HCPCS | Performed by: INTERNAL MEDICINE

## 2024-02-06 PROCEDURE — 4004F PT TOBACCO SCREEN RCVD TLK: CPT | Performed by: INTERNAL MEDICINE

## 2024-02-06 PROCEDURE — 3017F COLORECTAL CA SCREEN DOC REV: CPT | Performed by: INTERNAL MEDICINE

## 2024-02-06 PROCEDURE — 99214 OFFICE O/P EST MOD 30 MIN: CPT | Performed by: INTERNAL MEDICINE

## 2024-02-06 PROCEDURE — 3078F DIAST BP <80 MM HG: CPT | Performed by: INTERNAL MEDICINE

## 2024-02-06 ASSESSMENT — ENCOUNTER SYMPTOMS
ALLERGIC/IMMUNOLOGIC NEGATIVE: 1
EYES NEGATIVE: 1
GASTROINTESTINAL NEGATIVE: 1

## 2024-02-06 NOTE — PROGRESS NOTES
Subjective:      Patient ID: Barbara White is a 53 y.o. female.     HPI  Medications:   Requip 1 mg nightly  Trelegy Ellipta  Albuterol HFA:   Oxygen 3 LPM    This patient has chronic obstructive lung disease due to chronic bronchitis and emphysema.  She has chronic respiratory failure hypoxic in nature.  She is being treated with Trelegy and albuterol symptomatically she is doing well.  She was also advised to take supplemental oxygen 24 hours a day.  However she is using the oxygen only at night.  Her oxygen saturation in the office today was 98%.  This was on supplemental oxygen.  No evidence of acute exacerbation of COPD no yellow sputum no hemoptysis no chest pain.    Unfortunately she continues to smoke about half a pack a day in spite of repeated advised to stop smoking.  She does not use oxygen during the daytime even while ambulating.  She been advised to do so.  Over and above she been advised she must not smoke and use oxygen at the same time.    She does use her bronchodilators regularly.    She is also known to have restless leg syndrome and periodic leg movements.  She is being treated with Requip which has been helpful in controlling the symptoms.  Her pulmonary function study has revealed moderately severe degree of obstructive ventilatory dysfunction.  She already have her move COVID-vaccine, flu vaccine and Pneumovax.           PRIOR WORKUP:  PFT:  6-minute walk test 11/1/2022: Patient was on room air and dropped down to 87% with ambulation.  Required supplemental oxygen to maintain an SPO2 of greater than 90% with 3 L.     PFT 11/1/2022: FVC 2.10 L, 65% of predicted.  FEV1 1.52 L, 58% of predicted.  FEV1/FVC ratio 72, 89% of predicted.  FEF 25-75 is 1.08, 39% of predicted.  RV is 2.20, 118% of predicted.  TLC is 3.65, 71% of predicted.  DSB is 5.26, 26% of predicted.  Final impression: Mild discrete ventilatory restrictive dysfunction.  Diffusion capacity is reduced out of proportion to

## 2024-02-22 ENCOUNTER — TELEMEDICINE (OUTPATIENT)
Dept: NEUROLOGY | Age: 54
End: 2024-02-22
Payer: MEDICAID

## 2024-02-22 DIAGNOSIS — G57.93 NEUROPATHIC PAIN OF BOTH LEGS: ICD-10-CM

## 2024-02-22 DIAGNOSIS — G40.909 SEIZURE DISORDER (HCC): Primary | ICD-10-CM

## 2024-02-22 DIAGNOSIS — I10 ESSENTIAL HYPERTENSION: ICD-10-CM

## 2024-02-22 DIAGNOSIS — K86.0 ALCOHOL-INDUCED CHRONIC PANCREATITIS (HCC): ICD-10-CM

## 2024-02-22 DIAGNOSIS — E55.9 VITAMIN D DEFICIENCY: ICD-10-CM

## 2024-02-22 DIAGNOSIS — G43.009 MIGRAINE WITHOUT AURA AND WITHOUT STATUS MIGRAINOSUS, NOT INTRACTABLE: ICD-10-CM

## 2024-02-22 DIAGNOSIS — R94.01 ABNORMAL EEG: ICD-10-CM

## 2024-02-22 PROCEDURE — G8427 DOCREV CUR MEDS BY ELIG CLIN: HCPCS | Performed by: PSYCHIATRY & NEUROLOGY

## 2024-02-22 PROCEDURE — 3017F COLORECTAL CA SCREEN DOC REV: CPT | Performed by: PSYCHIATRY & NEUROLOGY

## 2024-02-22 PROCEDURE — 99214 OFFICE O/P EST MOD 30 MIN: CPT | Performed by: PSYCHIATRY & NEUROLOGY

## 2024-02-22 RX ORDER — ERGOCALCIFEROL 1.25 MG/1
CAPSULE ORAL
Qty: 4 CAPSULE | Refills: 0 | Status: SHIPPED | OUTPATIENT
Start: 2024-02-22

## 2024-02-22 RX ORDER — CARBAMAZEPINE 200 MG/1
200 TABLET ORAL 3 TIMES DAILY
Qty: 90 TABLET | Refills: 6 | Status: SHIPPED | OUTPATIENT
Start: 2024-02-22

## 2024-02-22 RX ORDER — PANCRELIPASE 24000; 76000; 120000 [USP'U]/1; [USP'U]/1; [USP'U]/1
CAPSULE, DELAYED RELEASE PELLETS ORAL
Qty: 84 CAPSULE | Refills: 0 | Status: SHIPPED | OUTPATIENT
Start: 2024-02-22

## 2024-02-22 RX ORDER — RIZATRIPTAN BENZOATE 10 MG/1
TABLET ORAL
Qty: 9 TABLET | Refills: 5 | Status: SHIPPED | OUTPATIENT
Start: 2024-02-22

## 2024-02-22 RX ORDER — AMLODIPINE BESYLATE 10 MG/1
TABLET ORAL
Qty: 28 TABLET | Refills: 0 | Status: SHIPPED | OUTPATIENT
Start: 2024-02-22

## 2024-02-22 NOTE — TELEPHONE ENCOUNTER
bleeding     Pseudocyst of pancreas     Sepsis (HCC)     Acute recurrent pancreatitis     Atelectasis of left lung     Hyponatremia     Iron deficiency anemia     Adenomatous polyp of ascending colon     Moderate episode of recurrent major depressive disorder (HCC)     Sleep disturbance     Intractable migraine without aura and without status migrainosus     Acute on chronic respiratory failure with hypoxia (HCC)     Altered mental status     Constipation

## 2024-02-22 NOTE — TELEPHONE ENCOUNTER
LAST VISIT: 2/6/24 with Dr Dueñas  NEXT VISIT: 6/13/24  LAST PRESCRIBED: 1/24/24    Unsure if you are willing to prescribe. Thank you.

## 2024-02-22 NOTE — PROGRESS NOTES
3 each 3    denosumab (PROLIA) 60 MG/ML SOSY SC injection Inject 1 mL into the skin every 6 months 1 mL 1    solifenacin (VESICARE) 10 MG tablet Take 1 tablet by mouth daily      prazosin (MINIPRESS) 1 MG capsule Take 1 capsule by mouth nightly 30 capsule 3     No current facility-administered medications on file prior to visit.     Allergies: Barbara White is allergic to seasonal.    Past Medical History:   Diagnosis Date    Acute respiratory failure with hypoxia ([facility]) 10/16/2020    Alcohol withdrawal syndrome, with delirium ([facility]) 12/14/2019    Alcoholism ([facility])     Anal warts     Anemia 10/2020    GI bleed    Anxiety     Astrocytoma ([facility]) - diagnosed at age 17, the patient underwent 2 surgical resections without known recurrence 10/23/2020    at age 17    Bandemia     Closed fracture of lateral portion of left tibial plateau 07/04/2013    Condyloma     COPD (chronic obstructive pulmonary disease) ([facility])     CO2 retainer, on Bipap at night for this, Dr. Dueñas ( last visit 11/20/2020 and note on chart )    COVID-19 vaccine administered     Depression      major depressive disorder, ptsd, anxiety    Dysphagia     GI bleed 10/2020    Hypertension     Memory loss     Oxygen dependent     USES  3L/MIN DAILY PRN AND NIGHTLY CONTINUOUSLY    Pain, joint, ankle and foot     Pancreatic lesion 10/2020    Dr. Camacho working up pt    Perianal wart     Peripheral neuropathy     Seizures ([facility])     LAST SEIZURE 3/2020 - FEELS IT WAS FROM ALCOHOL WITHDRAWL    Tension headache     Under care of team 09/29/2021    neuro-Dr Coyle-[facility] ct-last visit sep 2021    Under care of team 09/29/2021    pain management-Hamzah Martines-[facility]-last visit sep 2021    Under care of team     pulmonology-Dr Dueñas-[facility]-due for visit March 2022    Under care of team 09/29/2021    psych-bahnfeldt NP-telemed-last visit 10/ 2021    Under care of team 09/29/2021    de-[facility]-last visit aug 2021    Under care of team

## 2024-02-28 ENCOUNTER — TELEPHONE (OUTPATIENT)
Dept: GASTROENTEROLOGY | Age: 54
End: 2024-02-28

## 2024-02-28 DIAGNOSIS — R13.19 ESOPHAGEAL DYSPHAGIA: ICD-10-CM

## 2024-02-28 DIAGNOSIS — G57.93 NEUROPATHIC PAIN OF BOTH LEGS: ICD-10-CM

## 2024-02-28 NOTE — TELEPHONE ENCOUNTER
Pharmacy requesting refill of Duloxetine 60 mg.      Medication active on med list yes      Date of last Rx: 8/21/2023 with 5 refills          verified by ROBERT, MESHAA      Date of last appointment 2/22/2024    Next Visit Date:  Visit date not found

## 2024-02-29 ENCOUNTER — HOSPITAL ENCOUNTER (OUTPATIENT)
Dept: PREADMISSION TESTING | Age: 54
Discharge: HOME | End: 2024-03-04

## 2024-02-29 ENCOUNTER — TELEPHONE (OUTPATIENT)
Dept: GASTROENTEROLOGY | Age: 54
End: 2024-02-29

## 2024-02-29 VITALS — HEIGHT: 65 IN | BODY MASS INDEX: 22.33 KG/M2 | WEIGHT: 134 LBS

## 2024-02-29 DIAGNOSIS — K31.84 GASTROPARESIS: ICD-10-CM

## 2024-02-29 RX ORDER — DULOXETIN HYDROCHLORIDE 60 MG/1
CAPSULE, DELAYED RELEASE ORAL
Qty: 30 CAPSULE | Refills: 5 | Status: SHIPPED | OUTPATIENT
Start: 2024-02-29

## 2024-02-29 RX ORDER — SUCRALFATE 1 G/1
TABLET ORAL
Qty: 112 TABLET | Refills: 0 | Status: SHIPPED | OUTPATIENT
Start: 2024-02-29

## 2024-02-29 RX ORDER — METOCLOPRAMIDE 5 MG/1
TABLET ORAL
Qty: 112 TABLET | Refills: 0 | Status: SHIPPED | OUTPATIENT
Start: 2024-02-29

## 2024-02-29 RX ORDER — OMEPRAZOLE 40 MG/1
CAPSULE, DELAYED RELEASE ORAL
Qty: 56 CAPSULE | Refills: 0 | Status: SHIPPED | OUTPATIENT
Start: 2024-02-29

## 2024-02-29 NOTE — TELEPHONE ENCOUNTER
Writer called insurance preferred medications are   Biscodyl  Lactulose  Casanthranol/docusate sodium   Loperamide   Dicyclomine   diphenoxylate atropine

## 2024-02-29 NOTE — TELEPHONE ENCOUNTER
Aurelia from [facility] called to request a refill for both:  Metoclopramide and Sucralfate   Phone#: [phone]  Fax#: [phone]

## 2024-02-29 NOTE — PROGRESS NOTES
Pre-op Instructions For Out-Patient Endoscopy Surgery    Medication Instructions:  Please stop herbs and any supplements now (includes vitamins and minerals).    Please contact your surgeon and prescribing physician for pre-op instructions for any blood thinners.    If you have inhalers/aerosol treatments at home, please use them the morning of your surgery and bring the inhalers with you to the hospital.    Please take the following medications the morning of your surgery with a sip of water:    NORVASC, inhaler, tegretol, Topamax    Surgery Instructions:  After midnight before surgery:  Do not eat or drink anything, including water, mints, gum, and hard candy.  You may brush your teeth without swallowing.  No smoking, chewing tobacco, or street drugs.    Please shower or bathe before surgery.       Please do not wear any cologne, lotion, powder, jewelry, piercings, perfume, makeup, nail polish, hair accessories, or hair spray on the day of surgery.  Wear loose comfortable clothing.    Leave your valuables at home but bring a payment source for any after-surgery prescriptions you plan to fill at [facility].  Bring a storage case for any glasses/contacts.    An adult who is responsible for you MUST drive you home and should be with you for the first 24 hours after surgery.     The Day of Surgery:  Arrive at [facility] Surgery Entrance at the time directed by your surgeon and check in at the desk.     If you have a living will or healthcare power of , please bring a copy.    You will be taken to the pre-op holding area where you will be prepared for surgery.  A physical assessment will be performed by a nurse practitioner or house officer.  Your IV will be started and you will meet your anesthesiologist.    When you go to surgery, your family will be directed to the surgical waiting room, where the doctor should speak with them after your surgery.    After surgery, you will be taken

## 2024-03-01 ENCOUNTER — HOSPITAL ENCOUNTER (OUTPATIENT)
Dept: ULTRASOUND IMAGING | Age: 54
End: 2024-03-01
Payer: MEDICAID

## 2024-03-01 DIAGNOSIS — R94.8 ABNORMAL PET SCAN OF COLON: ICD-10-CM

## 2024-03-01 PROCEDURE — 76705 ECHO EXAM OF ABDOMEN: CPT

## 2024-03-12 NOTE — PRE-PROCEDURE INSTRUCTIONS
Have you received your Prep?YES Any questions with prep instructions?NO  Only Clear Liquid Diet day before.PATIENT STARTED PREP PER INSTRUCTIONS TODAY  Nothing to eat after midnight day before procedure.PT FOLLOWING DR HERR INSTRUCTIONS  Are you taking any blood thinners?NO If so, you need to Stop.  Remove any jewelry and body piercings.INSTRUCTED  Are you having any Covid symptoms?NO  Do you have any new rashes, infections, etc. that we should be aware of?NO  Do you have a ride home the day of surgery? YES It cannot be a cab or medical transportation.  Verify surgery time/date and what time to arrive at hospital. 0715

## 2024-03-13 ENCOUNTER — ANESTHESIA EVENT (OUTPATIENT)
Dept: ENDOSCOPY | Age: 54
End: 2024-03-13
Payer: MEDICAID

## 2024-03-14 ENCOUNTER — ANESTHESIA (OUTPATIENT)
Dept: ENDOSCOPY | Age: 54
End: 2024-03-14
Payer: MEDICAID

## 2024-03-14 ENCOUNTER — HOSPITAL ENCOUNTER (OUTPATIENT)
Age: 54
Setting detail: OUTPATIENT SURGERY
Discharge: HOME | End: 2024-03-14
Attending: INTERNAL MEDICINE | Admitting: INTERNAL MEDICINE
Payer: MEDICAID

## 2024-03-14 VITALS
DIASTOLIC BLOOD PRESSURE: 95 MMHG | RESPIRATION RATE: 16 BRPM | WEIGHT: 134 LBS | OXYGEN SATURATION: 93 % | SYSTOLIC BLOOD PRESSURE: 133 MMHG | HEART RATE: 63 BPM | HEIGHT: 65 IN | BODY MASS INDEX: 22.33 KG/M2 | TEMPERATURE: 97.5 F

## 2024-03-14 DIAGNOSIS — R13.19 ESOPHAGEAL DYSPHAGIA: Primary | ICD-10-CM

## 2024-03-14 DIAGNOSIS — Z86.010 HISTORY OF COLON POLYPS: ICD-10-CM

## 2024-03-14 PROBLEM — Z86.0100 HISTORY OF COLON POLYPS: Status: ACTIVE | Noted: 2024-03-14

## 2024-03-14 PROCEDURE — 45385 COLONOSCOPY W/LESION REMOVAL: CPT | Performed by: INTERNAL MEDICINE

## 2024-03-14 PROCEDURE — 7100000010 HC PHASE II RECOVERY - FIRST 15 MIN: Performed by: INTERNAL MEDICINE

## 2024-03-14 PROCEDURE — 7100000011 HC PHASE II RECOVERY - ADDTL 15 MIN: Performed by: INTERNAL MEDICINE

## 2024-03-14 PROCEDURE — 2500000003 HC RX 250 WO HCPCS: Performed by: NURSE ANESTHETIST, CERTIFIED REGISTERED

## 2024-03-14 PROCEDURE — 3609012400 HC EGD TRANSORAL BIOPSY SINGLE/MULTIPLE: Performed by: INTERNAL MEDICINE

## 2024-03-14 PROCEDURE — 2500000003 HC RX 250 WO HCPCS: Performed by: ANESTHESIOLOGY

## 2024-03-14 PROCEDURE — C1769 GUIDE WIRE: HCPCS | Performed by: INTERNAL MEDICINE

## 2024-03-14 PROCEDURE — 3700000000 HC ANESTHESIA ATTENDED CARE: Performed by: INTERNAL MEDICINE

## 2024-03-14 PROCEDURE — 45380 COLONOSCOPY AND BIOPSY: CPT | Performed by: INTERNAL MEDICINE

## 2024-03-14 PROCEDURE — 43248 EGD GUIDE WIRE INSERTION: CPT | Performed by: INTERNAL MEDICINE

## 2024-03-14 PROCEDURE — 3609010600 HC COLONOSCOPY POLYPECTOMY SNARE/COLD BIOPSY: Performed by: INTERNAL MEDICINE

## 2024-03-14 PROCEDURE — 43239 EGD BIOPSY SINGLE/MULTIPLE: CPT | Performed by: INTERNAL MEDICINE

## 2024-03-14 PROCEDURE — 6360000002 HC RX W HCPCS: Performed by: NURSE ANESTHETIST, CERTIFIED REGISTERED

## 2024-03-14 PROCEDURE — 88305 TISSUE EXAM BY PATHOLOGIST: CPT

## 2024-03-14 PROCEDURE — 3700000001 HC ADD 15 MINUTES (ANESTHESIA): Performed by: INTERNAL MEDICINE

## 2024-03-14 PROCEDURE — 2709999900 HC NON-CHARGEABLE SUPPLY: Performed by: INTERNAL MEDICINE

## 2024-03-14 PROCEDURE — 2580000003 HC RX 258: Performed by: ANESTHESIOLOGY

## 2024-03-14 RX ORDER — SODIUM CHLORIDE 9 MG/ML
INJECTION, SOLUTION INTRAVENOUS PRN
Status: DISCONTINUED | OUTPATIENT
Start: 2024-03-14 | End: 2024-03-14 | Stop reason: HOSPADM

## 2024-03-14 RX ORDER — LIDOCAINE HYDROCHLORIDE 10 MG/ML
1 INJECTION, SOLUTION EPIDURAL; INFILTRATION; INTRACAUDAL; PERINEURAL
Status: COMPLETED | OUTPATIENT
Start: 2024-03-14 | End: 2024-03-14

## 2024-03-14 RX ORDER — SODIUM CHLORIDE 0.9 % (FLUSH) 0.9 %
5-40 SYRINGE (ML) INJECTION PRN
Status: DISCONTINUED | OUTPATIENT
Start: 2024-03-14 | End: 2024-03-14 | Stop reason: HOSPADM

## 2024-03-14 RX ORDER — SODIUM CHLORIDE 0.9 % (FLUSH) 0.9 %
5-40 SYRINGE (ML) INJECTION EVERY 12 HOURS SCHEDULED
Status: DISCONTINUED | OUTPATIENT
Start: 2024-03-14 | End: 2024-03-14 | Stop reason: HOSPADM

## 2024-03-14 RX ORDER — SODIUM CHLORIDE, SODIUM LACTATE, POTASSIUM CHLORIDE, CALCIUM CHLORIDE 600; 310; 30; 20 MG/100ML; MG/100ML; MG/100ML; MG/100ML
INJECTION, SOLUTION INTRAVENOUS CONTINUOUS
Status: DISCONTINUED | OUTPATIENT
Start: 2024-03-14 | End: 2024-03-14 | Stop reason: HOSPADM

## 2024-03-14 RX ORDER — PROPOFOL 10 MG/ML
INJECTION, EMULSION INTRAVENOUS CONTINUOUS PRN
Status: DISCONTINUED | OUTPATIENT
Start: 2024-03-14 | End: 2024-03-14 | Stop reason: SDUPTHER

## 2024-03-14 RX ORDER — LIDOCAINE HYDROCHLORIDE 20 MG/ML
INJECTION, SOLUTION EPIDURAL; INFILTRATION; INTRACAUDAL; PERINEURAL PRN
Status: DISCONTINUED | OUTPATIENT
Start: 2024-03-14 | End: 2024-03-14 | Stop reason: SDUPTHER

## 2024-03-14 RX ADMIN — PROPOFOL 100 MCG/KG/MIN: 10 INJECTION, EMULSION INTRAVENOUS at 09:17

## 2024-03-14 RX ADMIN — LIDOCAINE HYDROCHLORIDE 100 MG: 20 INJECTION, SOLUTION EPIDURAL; INFILTRATION; INTRACAUDAL; PERINEURAL at 09:17

## 2024-03-14 RX ADMIN — LIDOCAINE HYDROCHLORIDE 1 ML: 10 INJECTION, SOLUTION EPIDURAL; INFILTRATION; INTRACAUDAL; PERINEURAL at 07:54

## 2024-03-14 RX ADMIN — SODIUM CHLORIDE, POTASSIUM CHLORIDE, SODIUM LACTATE AND CALCIUM CHLORIDE: 600; 310; 30; 20 INJECTION, SOLUTION INTRAVENOUS at 07:55

## 2024-03-14 ASSESSMENT — ENCOUNTER SYMPTOMS
BACK PAIN: 1
SHORTNESS OF BREATH: 0
TROUBLE SWALLOWING: 1
SORE THROAT: 0
COUGH: 0

## 2024-03-14 ASSESSMENT — PAIN - FUNCTIONAL ASSESSMENT
PAIN_FUNCTIONAL_ASSESSMENT: 0-10
PAIN_FUNCTIONAL_ASSESSMENT: 0-10

## 2024-03-14 ASSESSMENT — LIFESTYLE VARIABLES: SMOKING_STATUS: 1

## 2024-03-14 ASSESSMENT — COPD QUESTIONNAIRES: CAT_SEVERITY: SEVERE

## 2024-03-14 NOTE — ANESTHESIA PRE PROCEDURE
neuropathy    • Seizures (HCC)     LAST SEIZURE 3/2020 - FEELS IT WAS FROM ALCOHOL WITHDRAWL   • Tension headache    • Under care of team 09/29/2021    neuro-Dr Coyle-[facility] ct-last visit sep 2021   • Under care of team 09/29/2021    pain management-Hamzah Martines-[facility]-last visit sep 2021   • Under care of team     pulmonology-Dr Dueñas-[facility]-due for visit March 2022   • Under care of team 09/29/2021    psych-bahnfeldt NP-telemed-last visit 10/ 2021   • Under care of team 09/29/2021    kx-[address]-last visit aug 2021   • Under care of team     NEPHROLOGY - DR. LEE   • Wears glasses    • Wears partial dentures     UPPER   • Wellness examination     pcp-Ludivina Grimm-[facility] cornelio-last visit Jan 5, 2022       Past Surgical History:        Procedure Laterality Date   • ANUS SURGERY N/A 01/25/2022    EXCISION AND FULGURATION, ANAL, VAGINAL AND PERIANAL WARTS WITH CO2 LASER, FORTEC performed by Lorene Mercado MD at [facility] OR   • 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   • COLONOSCOPY N/A 10/22/2020    COLONOSCOPY DIAGNOSTIC performed by Manjeet Camacho MD at [facility] Endoscopy   • COLONOSCOPY N/A 11/19/2021    COLONOSCOPY W/ ENDOSCOPIC MUCOSAL RESECTION performed by Manjeet Camacho MD at [facility] Endoscopy   • COLONOSCOPY N/A 12/4/2023    COLONOSCOPY POLYPECTOMY HOT BIOPSY performed by Manjeet Camacho MD at [facility] OR   • COLONOSCOPY W/ BIOPSIES  12/04/2023    POLYPECTOMY HOT BIOPSY   • CT ABSCESS DRAIN SUBCUTANEOUS  07/28/2021    CT ABSCESS DRAIN SUBCUTANEOUS 7/28/2021 [facility] CT SCAN   • ENDOSCOPIC ULTRASOUND (LOWER) N/A 12/09/2020    ENDOSCOPIC ULTRASOUND, UPPER WITH LINEAR SCOPE FOR BIOPSY OF MASS ON HEAD OF PANCREAS performed by Agnieszka Escobedo MD at [facility] ENDO   • ENDOSCOPIC ULTRASOUND (UPPER)  02/09/2022    ENDOSCOPIC ULTRASOUND, EGD - GI SCHEDULED,EGD STENT REMOVAL   • ERCP N/A 08/11/2021    ERCP STENT INSERTION performed by Kevin King MD at [facility] Endoscopy   • ERCP  08/11/2021    
ROS.         Other Findings:         Anesthesia Plan      general     ASA 3     (TIVA)  Induction: intravenous.    MIPS: Prophylactic antiemetics administered.  Anesthetic plan and risks discussed with patient.      Plan discussed with CRNA.                  Nabila Parada MD   3/14/2024

## 2024-03-14 NOTE — OP NOTE
Colonoscopy report    Colonoscopy Procedure Note    Procedure:  Colonoscopy with polypectomy with biopsy forceps and cold snare    Procedure Date: 3/14/2024    Indications: Abnormal PET/CT, history of colon polyps    Sedation:  MAC    Attending Physician:  Dr. Basilio Lees MD.     Assistant Physician: None    Consent:   Informed consent was obtained for the procedure after explaining the risks including bleeding, perforation, aspiration, arrhythmia, risks related to sedation, reaction to medications and rarely death, benefits and alternatives to the patient. The patient verbalized understanding and agreed to proceed with the procedure.       Procedure Details:  The patient was placed in the left lateral decubitus position.  Oxygen and cardiac monitoring equipment was attached. The patient's vital signs were monitored continuously  throughout the procedure. After appropriate sedation was achieved, a rectal examination was performed.  The pediatric colonoscope was inserted into the rectum and advanced under direct vision to the terminal ileum.  The quality of the colonic preparation was good.  A careful inspection was made as the colonoscope was withdrawn, including a retroflexed view of the rectum; findings and interventions are described below.  Appropriate photodocumentation was obtained.           Complications:  None    Estimated blood loss:  Minimal           Disposition:  Home           Condition: stable    Withdrawal Time: 17 minutes    Findings:   The bowel prep was good.  The terminal ileum was unremarkable  A total of 2 polyps noted in the cecum, ranging between 3 to 5 mm, removed with cold snare.  A 2 mm polyp noted in the transverse colon that was removed with large biopsy forceps.  Retroflexion examination in the rectum with large internal hemorrhoids along with skin tags.    Specimen Removed: Multiple colon polyps    Endoscopic Impression:    Good bowel prep.  Total 3 polyps (2X cecum, 1X transverse  colon), ranging between 2 to 5 mm, resected and retrieved.  Large internal hemorrhoids  Skin tags    Recommendations:  Follow pathology results.  Recommend repeating colonoscopy in 5 years.    Attending Attestation: I performed the procedure.    Basilio Lees MD

## 2024-03-14 NOTE — DISCHARGE INSTRUCTIONS
over-the-counter spray to numb your throat. Sucking on throat lozenges and gargling with warm salt water may also help relieve your symptoms.   Follow-up care is a key part of your treatment and safety. Be sure to make and go to all appointments, and call your doctor if you are having problems. It's also a good idea to know your test results and keep a list of the medicines you take.  When should you call for help?   Call 911 anytime you think you may need emergency care. For example, call if:    You passed out (lost consciousness).     You have trouble breathing.     Your stools are maroon or very bloody   Call your doctor now or seek immediate medical care if:    You have new or worse belly pain.     You have a fever.     You have new or more blood in your stools.     You are sick to your stomach and cannot drink fluids.     You cannot pass stools or gas.     You have pain that does not get better after you take pain medicine.   Watch closely for changes in your health, and be sure to contact your doctor if:    Your throat still hurts after a day or two.     You do not get better as expected.   Where can you learn more?  Go to https://www.[employer].net/patientEd and enter J014 to learn more about \"Esophageal Dilation: What to Expect at Home.\"  Current as of: October 19, 2023               Content Version: 14.0  © 1885-0793 [employer].   Care instructions adapted under license by [employer]. If you have questions about a medical condition or this instruction, always ask your healthcare professional. [employer] disclaims any warranty or liability for your use of this information.

## 2024-03-14 NOTE — OP NOTE
EGD report    Esophagogastroduodenoscopy (EGD) Procedure Note    Procedure:  EGD with biopsies and dilation with savory guidewire assisted    Procedure Date: 3/14/2024    Indications: Dysphagia, gastroparesis    Sedation: MAC    Attending Physician:  Dr. Basilio Lees MD    Assistant: None    Procedure Details:    Informed consent was obtained for the procedure, including sedation. Risks of infection, perforation, hemorrhage, adverse drug reaction, and aspiration were discussed. The patient was placed in the left lateral decubitus position. The patient was monitored continuously with ECG tracing, pulse oximetry, blood pressure monitoring, and direct observation.      The gastroscope was inserted into the mouth and advanced under direct vision to second portion of the duodenum.  A careful inspection was made as the gastroscope was withdrawn, including a retroflexed view of the proximal stomach; findings and interventions are described below. Appropriate photodocumentation was obtained.    Findings:  Retropharyngeal area was grossly normal appearing     Esophagus: No significant stenosis/stricture noted, empiric dilation with savory guidewire assisted performed with 18 mm.  No mucosal disruption noted at the end of the procedure.    Biopsies from esophagus obtained to rule out GERD and EOE.                            Esophagogastric markings: Diaphragmatic hiatus-38 cm; GE junction-38 cm     Stomach:    Fundus: normal    Body: Patchy erythema    Antrum: Patchy erythema  Biopsies obtained to rule out H. pylori     Duodenum:     Bulb: normal    First part: Normal    Second Part: Normal      Complications:  None           Estimated blood loss: Minimal    Disposition: Home           Condition: Stable    Specimen Removed: Esophagus and stomach    Impression:    Normal esophagus, no stricture/stenosis, empiric dilation with savory guidewire assisted 18 mm performed.  No mucosal disruption at the end.  Esophageal biopsies  obtained to rule out GERD and EOE.  Patchy erythematous gastropathy, biopsies obtained rule out H. pylori.    Recommendations:  Follow pathology results.  Obtain esophagram and modified barium swallow study    Attending Attestation: I performed the procedure    Basilio Lees MD

## 2024-03-14 NOTE — ANESTHESIA POSTPROCEDURE EVALUATION
Department of Anesthesiology  Postprocedure Note    Patient: Barbara White  MRN: [id number]  YOB: 1970  Date of evaluation: 3/14/2024    Procedure Summary       Date: 03/14/24 Room / Location: [place] [address] / [facility]    Anesthesia Start: 0908 Anesthesia Stop: 1005    Procedures:       ESOPHAGOGASTRODUODENOSCOPY BIOPSY (Esophagus)      COLONOSCOPY POLYPECTOMY SNARE/COLD BIOPSY (Rectum) Diagnosis:       Esophageal dysphagia      History of colon polyps      (Esophageal dysphagia [R13.19])      (History of colon polyps [Z86.010])    Surgeons: Basilio Lees MD Responsible Provider: Mey Santiago MD    Anesthesia Type: MAC ASA Status: 3            Anesthesia Type: MAC    Marcell Phase I:      Marcell Phase II: Marcell Score: 10    Anesthesia Post Evaluation    Comments: POST- ANESTHESIA EVALUATION       Pt Name: Barbara White  MRN: [id number]  YOB: 1970  Date of evaluation: 3/14/2024  Time:  3:03 PM      BP (!) 133/95   Pulse 63   Temp 97.5 °F (36.4 °C) (Infrared)   Resp 16   Ht 1.651 m (5' 5\")   Wt 60.8 kg (134 lb)   SpO2 93%   BMI 22.30 kg/m²      Consciousness Level  Awake  Cardiopulmonary Status  Stable  Pain Adequately Treated YES  Nausea / Vomiting  NO  Adequate Hydration  YES  Anesthesia Related Complications NONE      Electronically signed by Mey Santiago MD on 3/14/2024 at 3:03 PM      No notable events documented.

## 2024-03-14 NOTE — H&P
HISTORY and PHYSICAL  [facility]       NAME:  Barbara White  MRN: [id number]   YOB: 1970   Date: 3/14/2024   Age: 53 y.o.  Gender: female       COMPLAINT AND PRESENT HISTORY:       Barbara White is 53 y.o.  female, here for     Procedure(s):  ESOPHAGOGASTRODUODENOSCOPY BIOPSY  COLONOSCOPY DIAGNOSTIC    Pre-Op Diagnosis Codes:     * Esophageal dysphagia [R13.19]     * History of colon polyps [Z86.010]    Below italics a portion of GI office visit note by Dr. Lees dated 02/05/24: Reviewed   HISTORY OF PRESENT ILLNESS: Ms.Heather TIANNA White is a 53 y.o. female with a past history remarkable for hypertension, COPD, prior history of alcoholism, chronic pancreatitis on Creon's, identified to have a possible gastroparesis and previous history of dysphagia status post dilation. Prior history of advanced lesion in the colon status post EMR.  The patient is here for a follow-up.     The patient had a colonoscopy in December 2023 that was notable for suboptimal prep.  She reports that she can sometimes have difficulty going to the bathroom.  She is not taking any laxatives currently.  She does have some senna at home.  She denies any blood in stool.  She had a recent PET scan that showed uptake in the proximal transverse colon.     She also has trouble with swallowing food along with GERD.  She takes omeprazole for this daily.  Previous plan for EGD however this was not completed yet.  She also takes Carafate for this.     She also takes Reglan for her gastroparesis and is only able to do 1 meal per day.     She previously had multiple ERCPs as well with the PD stone removal.    Previous Endoscopies     Colonoscopy 12/2023:  Suboptimal bowel prep-segments of transverse colon, hepatic flexure and descending colon there were occupied by semiformed stool that made examination limited     1-8 mm ascending colon polyp status post hot snare polypectomy removal  2-moderate nonspecific distal  without known recurrence 10/23/2020    Alcohol use disorder, severe, in early remission (HCC) 10/23/2020    Metabolic encephalopathy     Recurrent major depressive disorder (HCC) 10/20/2020    Elevated CA 19-9 level 10/20/2020    Pancreatic cyst 10/19/2020    Acute respiratory failure with hypoxia (HCC) 10/16/2020    Paresthesia of lower extremity 10/13/2020    COPD exacerbation (HCC) 10/12/2020    Seizure disorder ([facility])     Ambulatory dysfunction 12/17/2019    Tobacco use     Hypomagnesemia 12/15/2019    Alcoholic peripheral neuropathy (HCC) 12/14/2019    Hypokalemia 12/14/2019    Macrocytic anemia 12/14/2019    Psychophysiological insomnia 06/05/2019    Anxiety 06/05/2019    Essential hypertension 08/13/2015    Nicotine addiction 08/13/2015    Reflex sympathetic dystrophy of lower limb 03/27/2014    Acute bronchitis 10/02/2012           LOI Ferreira - CNP on 3/14/2024 at 7:17 AM

## 2024-03-18 LAB — SURGICAL PATHOLOGY REPORT: NORMAL

## 2024-03-21 ENCOUNTER — TELEPHONE (OUTPATIENT)
Dept: FAMILY MEDICINE CLINIC | Age: 54
End: 2024-03-21

## 2024-03-21 DIAGNOSIS — I10 ESSENTIAL HYPERTENSION: ICD-10-CM

## 2024-03-21 DIAGNOSIS — J42 CHRONIC BRONCHITIS, UNSPECIFIED CHRONIC BRONCHITIS TYPE (HCC): ICD-10-CM

## 2024-03-21 DIAGNOSIS — E55.9 VITAMIN D DEFICIENCY: ICD-10-CM

## 2024-03-21 RX ORDER — ALBUTEROL SULFATE 90 UG/1
2 AEROSOL, METERED RESPIRATORY (INHALATION) 4 TIMES DAILY PRN
Qty: 1 EACH | Refills: 3 | Status: SHIPPED | OUTPATIENT
Start: 2024-03-21

## 2024-03-21 RX ORDER — AMLODIPINE BESYLATE 10 MG/1
TABLET ORAL
Qty: 28 TABLET | Refills: 0 | Status: SHIPPED | OUTPATIENT
Start: 2024-03-21

## 2024-03-21 RX ORDER — ATORVASTATIN CALCIUM 20 MG/1
TABLET, FILM COATED ORAL
Qty: 28 TABLET | Refills: 5 | Status: SHIPPED | OUTPATIENT
Start: 2024-03-21 | End: 2024-06-19

## 2024-03-21 RX ORDER — ERGOCALCIFEROL 1.25 MG/1
CAPSULE ORAL
Qty: 4 CAPSULE | Refills: 0 | Status: SHIPPED | OUTPATIENT
Start: 2024-03-21

## 2024-03-21 NOTE — TELEPHONE ENCOUNTER
Pharmacy requesting refill of atorvastatin (LIPITOR) 20 MG tablet      Medication active on med list yes      Date of last Rx: 12/22/2023 with 2 refills          verified by JEMIMA LLAMAS      Date of last appointment 2/22/2024    Next Visit Date:  Visit date not found

## 2024-03-21 NOTE — TELEPHONE ENCOUNTER
Last visit: 1/24/24  Last Med refill: 2/2024  Does patient have enough medication for 72 hours:     Next Visit Date:  Future Appointments   Date Time Provider Department Center   4/4/2024 10:45 AM [facility]   5/24/2024 11:00 AM Basilio Lees MD [facility]   6/4/2024 10:00 AM STV CT [facility] STVZ RHVC CT STV Radiolog   6/13/2024  9:00 AM Donato Dueñas MD Resp Spec [facility]       Health Maintenance   Topic Date Due    Hepatitis B vaccine (1 of 3 - 3-dose series) Never done    COVID-19 Vaccine (5 - 2023-24 season) 01/24/2025 (Originally 9/1/2023)    Lipids  10/04/2024    Low dose CT lung screening &/or counseling  01/10/2025    Depression Monitoring  01/24/2025    Breast cancer screen  08/14/2025    DTaP/Tdap/Td vaccine (2 - Td or Tdap) 12/28/2030    Colorectal Cancer Screen  03/14/2034    Flu vaccine  Completed    Shingles vaccine  Completed    Pneumococcal 0-64 years Vaccine  Completed    Hepatitis C screen  Completed    HIV screen  Completed    Hepatitis A vaccine  Aged Out    Hib vaccine  Aged Out    Polio vaccine  Aged Out    Meningococcal (ACWY) vaccine  Aged Out       Hemoglobin A1C (%)   Date Value   04/13/2021 5.5   07/14/2019 4.3             ( goal A1C is < 7)   No components found for: \"LABMICR\"  LDL Cholesterol (mg/dL)   Date Value   10/04/2023 119   09/01/2022 176 (H)       (goal LDL is <100)   AST (U/L)   Date Value   06/06/2023 14     ALT (U/L)   Date Value   06/06/2023 <5 (L)     BUN (mg/dL)   Date Value   10/04/2023 10     BP Readings from Last 3 Encounters:   03/14/24 (!) 133/95   02/06/24 106/76   02/05/24 123/86          (goal 120/80)    All Future Testing planned in [facility]  Lab Frequency Next Occurrence   EGD Once 07/07/2023   CT CHEST LOW DOSE (LDCT) Once 07/23/2024   EGD Routine Once 03/05/2024   COLONOSCOPY (Diagnostic) Once 03/05/2024   CT CHEST WO CONTRAST Once 02/06/2024   FL ESOPHAGRAM Once 03/14/2024   FL MODIFIED BARIUM SWALLOW W VIDEO Once

## 2024-03-23 NOTE — PLAN OF CARE
Problem: Falls - Risk of:  Goal: Will remain free from falls  Description  Will remain free from falls  12/16/2019 1818 by Salud Salomon RN  Outcome: Ongoing  12/16/2019 0609 by Thelma Hooker RN  Outcome: Met This Shift  Goal: Absence of physical injury  Description  Absence of physical injury  12/16/2019 1818 by Salud Salomon RN  Outcome: Ongoing  12/16/2019 0609 by Thelma Hooker RN  Outcome: Met This Shift     Problem: Pain:  Goal: Pain level will decrease  Description  Pain level will decrease  12/16/2019 1818 by Salud Salomon RN  Outcome: Ongoing  12/16/2019 0609 by Thelma Hooker RN  Outcome: Met This Shift  Goal: Control of acute pain  Description  Control of acute pain  12/16/2019 1818 by Salud Salomon RN  Outcome: Ongoing  12/16/2019 0609 by Thelma Hooker RN  Outcome: Met This Shift  Goal: Control of chronic pain  Description  Control of chronic pain  Outcome: 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 CD10+ DLBCL, positive for BCL-2 rearrangement, negative for MYC rearrangement.  Plan for curative intent treatment with R-CHOP along with IV MTX (3.5g/m2) on D14 given extensive spine involvement  ECHO reviewed EF 71%, hepatitis/EBV/HIV panel negative   Pain control/PT/OT  Continue allopurinol 300 mg daily  Monitor TLS labs and CBC daily, Rasburicase 3mg if uric acid >8 and 6mg for uric acid>12  Transfuse if Hb <8, PLT <10 or <15 and febrile or <50 and bleeding.  Rituxan today, developed infusion reactions when increased rate to 150 cc/hr, rigors and chills, Benadryl 25 mg IV, Hydrocortisone 50 mg and Pepcid 20 mg x1 dose given.

## 2024-03-26 DIAGNOSIS — K86.0 ALCOHOL-INDUCED CHRONIC PANCREATITIS (HCC): ICD-10-CM

## 2024-03-26 RX ORDER — PANCRELIPASE 24000; 76000; 120000 [USP'U]/1; [USP'U]/1; [USP'U]/1
CAPSULE, DELAYED RELEASE PELLETS ORAL
Qty: 90 CAPSULE | Refills: 0 | Status: SHIPPED | OUTPATIENT
Start: 2024-03-26

## 2024-03-26 NOTE — TELEPHONE ENCOUNTER
Last visit: 1/24/24  Last Med refill: 2/22/24  Does patient have enough medication for 72 hours: No:     Next Visit Date:  Future Appointments   Date Time Provider Department Center   4/4/2024 10:45 AM [facility]   5/24/2024 11:00 AM [facility]   6/4/2024 10:00 AM STV CT RHVC STVZ RHVC CT STV Radiolog   6/13/2024  9:00 AM Donato Dueñas MD Resp Spec [facility]       Health Maintenance   Topic Date Due    Hepatitis B vaccine (1 of 3 - 3-dose series) Never done    COVID-19 Vaccine (5 - 2023-24 season) 01/24/2025 (Originally 9/1/2023)    Lipids  10/04/2024    Low dose CT lung screening &/or counseling  01/10/2025    Depression Monitoring  01/24/2025    Breast cancer screen  08/14/2025    DTaP/Tdap/Td vaccine (2 - Td or Tdap) 12/28/2030    Colorectal Cancer Screen  03/14/2034    Flu vaccine  Completed    Shingles vaccine  Completed    Pneumococcal 0-64 years Vaccine  Completed    Hepatitis C screen  Completed    HIV screen  Completed    Hepatitis A vaccine  Aged Out    Hib vaccine  Aged Out    Polio vaccine  Aged Out    Meningococcal (ACWY) vaccine  Aged Out       Hemoglobin A1C (%)   Date Value   04/13/2021 5.5   07/14/2019 4.3             ( goal A1C is < 7)   No components found for: \"LABMICR\"  LDL Cholesterol (mg/dL)   Date Value   10/04/2023 119   09/01/2022 176 (H)       (goal LDL is <100)   AST (U/L)   Date Value   06/06/2023 14     ALT (U/L)   Date Value   06/06/2023 <5 (L)     BUN (mg/dL)   Date Value   10/04/2023 10     BP Readings from Last 3 Encounters:   03/14/24 (!) 133/95   02/06/24 106/76   02/05/24 123/86          (goal 120/80)    All Future Testing planned in [facility]  Lab Frequency Next Occurrence   EGD Once 07/07/2023   CT CHEST LOW DOSE (LDCT) Once 07/23/2024   EGD Routine Once 03/05/2024   COLONOSCOPY (Diagnostic) Once 03/05/2024   CT CHEST WO CONTRAST Once 02/06/2024   FL ESOPHAGRAM Once 03/14/2024   FL MODIFIED BARIUM SWALLOW W VIDEO Once

## 2024-03-27 RX ORDER — NICOTINE 21 MG/24HR
1 PATCH, TRANSDERMAL 24 HOURS TRANSDERMAL DAILY
Qty: 14 PATCH | Refills: 1 | Status: SHIPPED | OUTPATIENT
Start: 2024-03-27 | End: 2024-05-17

## 2024-03-27 NOTE — TELEPHONE ENCOUNTER
LAST VISIT: 2/6/24 with Dr Dueñas  NEXT VISIT: 6/13/24  LAST PRESCRIBED: 2/22/24    Unsure if you are willing to prescribe or if you wanted to prescribe step-down dose. Thank you.

## 2024-03-28 ENCOUNTER — TELEPHONE (OUTPATIENT)
Dept: GASTROENTEROLOGY | Age: 54
End: 2024-03-28

## 2024-03-28 NOTE — TELEPHONE ENCOUNTER
[facility] pharmacy called and noted that they were requesting refills on patients Reglan, sucralfate and omeprazole

## 2024-03-29 DIAGNOSIS — R13.19 ESOPHAGEAL DYSPHAGIA: ICD-10-CM

## 2024-03-29 DIAGNOSIS — K31.84 GASTROPARESIS: ICD-10-CM

## 2024-03-29 RX ORDER — METOCLOPRAMIDE 5 MG/1
TABLET ORAL
Qty: 120 TABLET | Refills: 2 | Status: SHIPPED | OUTPATIENT
Start: 2024-03-29

## 2024-03-29 RX ORDER — SUCRALFATE 1 G/1
TABLET ORAL
Qty: 120 TABLET | Refills: 2 | Status: SHIPPED | OUTPATIENT
Start: 2024-03-29

## 2024-03-29 RX ORDER — OMEPRAZOLE 40 MG/1
CAPSULE, DELAYED RELEASE ORAL
Qty: 60 CAPSULE | Refills: 2 | Status: SHIPPED | OUTPATIENT
Start: 2024-03-29

## 2024-04-03 ENCOUNTER — HOSPITAL ENCOUNTER (OUTPATIENT)
Dept: GENERAL RADIOLOGY | Age: 54
Discharge: HOME | End: 2024-04-05
Attending: INTERNAL MEDICINE
Payer: MEDICAID

## 2024-04-03 DIAGNOSIS — R13.19 ESOPHAGEAL DYSPHAGIA: ICD-10-CM

## 2024-04-03 PROCEDURE — 74230 X-RAY XM SWLNG FUNCJ C+: CPT

## 2024-04-03 PROCEDURE — 74220 X-RAY XM ESOPHAGUS 1CNTRST: CPT

## 2024-04-03 PROCEDURE — 2500000003 HC RX 250 WO HCPCS: Performed by: INTERNAL MEDICINE

## 2024-04-03 PROCEDURE — 92611 MOTION FLUOROSCOPY/SWALLOW: CPT

## 2024-04-03 RX ADMIN — BARIUM SULFATE 340 G: 980 POWDER, FOR SUSPENSION ORAL at 09:20

## 2024-04-03 RX ADMIN — BARIUM SULFATE 90 ML: 960 POWDER, FOR SUSPENSION ORAL at 09:19

## 2024-04-03 NOTE — PROCEDURES
INSTRUMENTAL SWALLOW REPORT  MODIFIED BARIUM SWALLOW    NAME: Barbara White   : 1970  MRN: [id number]       Date of Eval: 4/3/2024              Referring Diagnosis(es):      Past Medical History:  has a past medical history of Acute respiratory failure with hypoxia (HCC), Alcohol withdrawal syndrome, with delirium (HCC), Alcoholism (HCC), Anal warts, Anemia, Anxiety, Astrocytoma (HCC) - diagnosed at age 17, the patient underwent 2 surgical resections without known recurrence, Bandemia, Closed fracture of lateral portion of left tibial plateau, Condyloma, COPD (chronic obstructive pulmonary disease) (HCC), COVID-19 vaccine administered, Depression, Dysphagia, GI bleed, Hypertension, Memory loss, Oxygen dependent, Pain, joint, ankle and foot, Pancreatic lesion, Perianal wart, Peripheral neuropathy, Seizures (HCC), Tension headache, Under care of team, Under care of team, Under care of team, Under care of team, Under care of team, Under care of team, Wears glasses, Wears partial dentures, and Wellness examination.  Past Surgical History:  has a past surgical history that includes Hysterectomy (); Brain tumor excision (); fracture surgery (Left, 2013); fracture surgery (Right); Upper gastrointestinal endoscopy (N/A, 10/22/2020); Colonoscopy (N/A, 10/22/2020); Endoscopic ultrasonography, GI (N/A, 2020); Upper gastrointestinal endoscopy (N/A, 2021); Hand surgery; CT ABSCESS DRAINAGE (2021); ERCP (N/A, 2021); ERCP (2021); Upper gastrointestinal endoscopy (N/A, 2021); Spine surgery (N/A, 09/10/2021); ERCP (N/A, 10/21/2021); ERCP (10/21/2021); ERCP (10/21/2021); Colonoscopy (N/A, 2021); Anus surgery (N/A, 2022); endoscopic ultrasound (upper) (2022); Upper gastrointestinal endoscopy (N/A, 2022); Upper gastrointestinal endoscopy (2022); Colonoscopy w/ biopsies (2023); Colonoscopy (N/A, 2023); Upper gastrointestinal

## 2024-04-04 ENCOUNTER — OFFICE VISIT (OUTPATIENT)
Dept: FAMILY MEDICINE CLINIC | Age: 54
End: 2024-04-04

## 2024-04-04 VITALS
BODY MASS INDEX: 22.3 KG/M2 | TEMPERATURE: 99.7 F | DIASTOLIC BLOOD PRESSURE: 88 MMHG | WEIGHT: 134 LBS | SYSTOLIC BLOOD PRESSURE: 120 MMHG | OXYGEN SATURATION: 97 % | HEART RATE: 94 BPM

## 2024-04-04 DIAGNOSIS — M48.56XD NON-TRAUMATIC COMPRESSION FRACTURE OF L1 LUMBAR VERTEBRA WITH ROUTINE HEALING, SUBSEQUENT ENCOUNTER: ICD-10-CM

## 2024-04-04 DIAGNOSIS — R25.2 CRAMPS OF LEFT LOWER EXTREMITY: Primary | ICD-10-CM

## 2024-04-04 DIAGNOSIS — M80.80XD OTHER OSTEOPOROSIS WITH CURRENT PATHOLOGICAL FRACTURE WITH ROUTINE HEALING, SUBSEQUENT ENCOUNTER: ICD-10-CM

## 2024-04-04 RX ORDER — DENOSUMAB 60 MG/ML
60 INJECTION SUBCUTANEOUS
Qty: 1 ML | Refills: 1 | Status: SHIPPED | OUTPATIENT
Start: 2024-04-04

## 2024-04-04 RX ORDER — DENOSUMAB 60 MG/ML
60 INJECTION SUBCUTANEOUS
Qty: 1 ML | Refills: 1 | Status: SHIPPED | OUTPATIENT
Start: 2024-04-04 | End: 2024-04-04 | Stop reason: SDUPTHER

## 2024-04-04 SDOH — ECONOMIC STABILITY: TRANSPORTATION INSECURITY
IN THE PAST 12 MONTHS, HAS LACK OF TRANSPORTATION KEPT YOU FROM MEETINGS, WORK, OR FROM GETTING THINGS NEEDED FOR DAILY LIVING?: NO

## 2024-04-04 SDOH — ECONOMIC STABILITY: FOOD INSECURITY: WITHIN THE PAST 12 MONTHS, THE FOOD YOU BOUGHT JUST DIDN'T LAST AND YOU DIDN'T HAVE MONEY TO GET MORE.: NEVER TRUE

## 2024-04-04 SDOH — ECONOMIC STABILITY: FOOD INSECURITY: WITHIN THE PAST 12 MONTHS, YOU WORRIED THAT YOUR FOOD WOULD RUN OUT BEFORE YOU GOT MONEY TO BUY MORE.: SOMETIMES TRUE

## 2024-04-04 SDOH — ECONOMIC STABILITY: INCOME INSECURITY: HOW HARD IS IT FOR YOU TO PAY FOR THE VERY BASICS LIKE FOOD, HOUSING, MEDICAL CARE, AND HEATING?: SOMEWHAT HARD

## 2024-04-04 ASSESSMENT — ENCOUNTER SYMPTOMS
ANAL BLEEDING: 0
CONSTIPATION: 0
BLOOD IN STOOL: 0
SHORTNESS OF BREATH: 0
NAUSEA: 0
VOMITING: 0
WHEEZING: 0
CHEST TIGHTNESS: 0
COUGH: 0
DIARRHEA: 0
CHOKING: 0

## 2024-04-04 ASSESSMENT — VISUAL ACUITY: OU: 1

## 2024-04-04 NOTE — PROGRESS NOTES
[facility]  [facility]  [address] [place] [address]  Dept: [phone]  Dept Fax: [phone]      Barbara White is a 53 y.o. female who presents today for hermedical conditions/complaints as noted below.  Barbara White is c/o of Check-Up, Leg Pain (Pt states that if she walks along time the pain gets so bad, it brings her to tears ), Hypertension (F/u), and Sinus Problem (Some sinus pressure, temp 99.7, feeling woozie )        Assessment/Plan:     1. Cramps of left lower extremity  -     Vascular lower arterial complete physiologic Doppler w exercise; Future  2. Non-traumatic compression fracture of L1 lumbar vertebra with routine healing, subsequent encounter  -     denosumab (PROLIA) 60 MG/ML SOSY SC injection; Inject 1 mL into the skin every 6 months, Disp-1 mL, R-1Print  3. Other osteoporosis with current pathological fracture with routine healing, subsequent encounter  -     denosumab (PROLIA) 60 MG/ML SOSY SC injection; Inject 1 mL into the skin every 6 months, Disp-1 mL, R-1Print      Will get ESE - if PA druled out then will refer to podiatry to get eval for plantar fasciitis v/s ortho     No follow-ups on file.      HPI     Leg Pain   Incident onset: 3/6/24. There was no injury mechanism. The pain is present in the left leg (left calf). The quality of the pain is described as burning and cramping. The pain is moderate. The pain has been Intermittent since onset. Pertinent negatives include no inability to bear weight, loss of motion, loss of sensation, muscle weakness, numbness or tingling. The symptoms are aggravated by movement. She has tried acetaminophen for the symptoms. The treatment provided moderate relief.   Had traveled to [place] when the pain started, she did have breaks during this drive.  COPD:  Current treatment includes short-acting beta agonist inhaler, Trelegy, which has been effective.  Residual symptoms: none.  Denies any other symptoms.

## 2024-04-12 ENCOUNTER — HOSPITAL ENCOUNTER (OUTPATIENT)
Dept: VASCULAR LAB | Age: 54
End: 2024-04-12
Payer: MEDICAID

## 2024-04-12 ENCOUNTER — TELEPHONE (OUTPATIENT)
Dept: FAMILY MEDICINE CLINIC | Age: 54
End: 2024-04-12

## 2024-04-12 DIAGNOSIS — R25.2 CRAMPS OF LEFT LOWER EXTREMITY: ICD-10-CM

## 2024-04-12 PROCEDURE — 93924 LWR XTR VASC STDY BILAT: CPT

## 2024-04-12 NOTE — TELEPHONE ENCOUNTER
[facility] they are requesting refill on Prolia.   Advise her that patient would like to go through infusion center due to injection being delivered and not being put in fridge in time.

## 2024-04-13 LAB
VAS IMMEDIATE LEFT ABI: 0.27
VAS IMMEDIATE LEFT POST TIBIAL BP: 36 MMHG
VAS IMMEDIATE RIGHT ABI: 1.01
VAS IMMEDIATE RIGHT BRACHIAL BP: 135 MMHG
VAS IMMEDIATE RIGHT POST TIBIAL BP: 137 MMHG
VAS LEFT ABI: 0.56
VAS LEFT ANKLE BP: 70 MMHG
VAS LEFT ARM BP: 124 MMHG
VAS LEFT DORSALIS PEDIS BP: 61 MMHG
VAS LEFT PTA BP: 70 MMHG
VAS LEFT TBI: 0.31
VAS LEFT TOE PRESSURE: 39 MMHG
VAS POST1 LEFT ABI: 0.37
VAS POST1 LEFT POST TIBIAL BP: 49 MMHG
VAS POST1 RIGHT ABI: 1.05
VAS POST1 RIGHT BRACHIAL BP: 131 MMHG
VAS POST1 RIGHT POST TIBIAL BP: 137 MMHG
VAS POST2 LEFT ABI: 0.43
VAS POST2 LEFT POST TIBIAL BP: 57 MMHG
VAS POST2 RIGHT ABI: 1.05
VAS POST2 RIGHT BRACHIAL BP: 132 MMHG
VAS POST2 RIGHT POST TIBIAL BP: 139 MMHG
VAS RIGHT ABI: 1.06
VAS RIGHT ANKLE BP: 132 MMHG
VAS RIGHT ARM BP: 125 MMHG
VAS RIGHT DORSALIS PEDIS BP: 129 MMHG
VAS RIGHT PTA BP: 132 MMHG
VAS RIGHT TBI: 0.77
VAS RIGHT TOE PRESSURE: 96 MMHG

## 2024-04-13 PROCEDURE — 93924 LWR XTR VASC STDY BILAT: CPT | Performed by: SURGERY

## 2024-04-18 DIAGNOSIS — E55.9 VITAMIN D DEFICIENCY: ICD-10-CM

## 2024-04-18 DIAGNOSIS — I10 ESSENTIAL HYPERTENSION: ICD-10-CM

## 2024-04-18 DIAGNOSIS — J42 CHRONIC BRONCHITIS, UNSPECIFIED CHRONIC BRONCHITIS TYPE (HCC): ICD-10-CM

## 2024-04-18 DIAGNOSIS — K86.0 ALCOHOL-INDUCED CHRONIC PANCREATITIS (HCC): ICD-10-CM

## 2024-04-18 RX ORDER — PANCRELIPASE 24000; 76000; 120000 [USP'U]/1; [USP'U]/1; [USP'U]/1
CAPSULE, DELAYED RELEASE PELLETS ORAL
Qty: 90 CAPSULE | Refills: 0 | Status: SHIPPED | OUTPATIENT
Start: 2024-04-18

## 2024-04-18 RX ORDER — AMLODIPINE BESYLATE 10 MG/1
TABLET ORAL
Qty: 90 TABLET | Refills: 1 | Status: SHIPPED | OUTPATIENT
Start: 2024-04-18

## 2024-04-18 RX ORDER — ALBUTEROL SULFATE 90 UG/1
2 AEROSOL, METERED RESPIRATORY (INHALATION) 4 TIMES DAILY PRN
Refills: 0 | OUTPATIENT
Start: 2024-04-18

## 2024-04-18 RX ORDER — ERGOCALCIFEROL 1.25 MG/1
CAPSULE ORAL
Qty: 12 CAPSULE | Refills: 1 | Status: SHIPPED | OUTPATIENT
Start: 2024-04-18

## 2024-04-18 NOTE — TELEPHONE ENCOUNTER
Last visit: 4/4/24  Last Med refill: 3/21/24, 3/26/24  Does patient have enough medication for 72 hours: No:     Next Visit Date:  Future Appointments   Date Time Provider Department Center   5/13/2024 10:45 AM [facility]   6/4/2024 10:00 AM STV CT RHVC STVZ RHVC CT STV Radiolog   6/13/2024  9:00 AM Donato Dueñas MD Resp Spec [facility]   8/5/2024 10:30 AM [facility]       Health Maintenance   Topic Date Due    Hepatitis B vaccine (1 of 3 - 3-dose series) Never done    COVID-19 Vaccine (5 - 2023-24 season) 01/24/2025 (Originally 9/1/2023)    Lipids  10/04/2024    Low dose CT lung screening &/or counseling  01/10/2025    Depression Monitoring  01/24/2025    Breast cancer screen  08/14/2025    DTaP/Tdap/Td vaccine (2 - Td or Tdap) 12/28/2030    Colorectal Cancer Screen  03/14/2034    Flu vaccine  Completed    Shingles vaccine  Completed    Pneumococcal 0-64 years Vaccine  Completed    Hepatitis C screen  Completed    HIV screen  Completed    Hepatitis A vaccine  Aged Out    Hib vaccine  Aged Out    Polio vaccine  Aged Out    Meningococcal (ACWY) vaccine  Aged Out       Hemoglobin A1C (%)   Date Value   04/13/2021 5.5   07/14/2019 4.3             ( goal A1C is < 7)   No components found for: \"LABMICR\"  LDL Cholesterol (mg/dL)   Date Value   10/04/2023 119   09/01/2022 176 (H)       (goal LDL is <100)   AST (U/L)   Date Value   06/06/2023 14     ALT (U/L)   Date Value   06/06/2023 <5 (L)     BUN (mg/dL)   Date Value   10/04/2023 10     BP Readings from Last 3 Encounters:   04/04/24 120/88   03/14/24 (!) 133/95   02/06/24 106/76          (goal 120/80)    All Future Testing planned in [facility]  Lab Frequency Next Occurrence   EGD Once 07/07/2023   CT CHEST LOW DOSE (LDCT) Once 07/23/2024   EGD Routine Once 03/05/2024   COLONOSCOPY (Diagnostic) Once 03/05/2024   CT CHEST WO CONTRAST Once 02/06/2024               Patient Active Problem List:     Acute bronchitis

## 2024-04-19 ENCOUNTER — TELEPHONE (OUTPATIENT)
Dept: FAMILY MEDICINE CLINIC | Age: 54
End: 2024-04-19

## 2024-04-19 DIAGNOSIS — R07.9 MODERATE CORONARY ARTERY RISK CHEST PAIN: Primary | ICD-10-CM

## 2024-04-19 NOTE — TELEPHONE ENCOUNTER
Normal on the right, abnormal on the left - moderate arterial disease on the left. Please notify and refer to vascular surgery if agreeable

## 2024-04-19 NOTE — TELEPHONE ENCOUNTER
Patient called requesting results of vascular scan of legs. She stated she is still having issues.  Patient was informed provider is not in office on fridays

## 2024-04-22 DIAGNOSIS — J42 CHRONIC BRONCHITIS, UNSPECIFIED CHRONIC BRONCHITIS TYPE (HCC): ICD-10-CM

## 2024-04-22 RX ORDER — ALBUTEROL SULFATE 90 UG/1
2 AEROSOL, METERED RESPIRATORY (INHALATION) 4 TIMES DAILY PRN
Qty: 1 EACH | Refills: 5 | Status: SHIPPED | OUTPATIENT
Start: 2024-04-22

## 2024-04-22 NOTE — TELEPHONE ENCOUNTER
Atelectasis of left lung     Hyponatremia     Iron deficiency anemia     Adenomatous polyp of ascending colon     Moderate episode of recurrent major depressive disorder (HCC)     Sleep disturbance     Intractable migraine without aura and without status migrainosus     Acute on chronic respiratory failure with hypoxia (HCC)     Altered mental status     Constipation     History of colon polyps

## 2024-05-03 ENCOUNTER — INITIAL CONSULT (OUTPATIENT)
Dept: VASCULAR SURGERY | Age: 54
End: 2024-05-03
Payer: MEDICAID

## 2024-05-03 ENCOUNTER — PATIENT MESSAGE (OUTPATIENT)
Dept: FAMILY MEDICINE CLINIC | Age: 54
End: 2024-05-03

## 2024-05-03 VITALS
HEART RATE: 79 BPM | RESPIRATION RATE: 16 BRPM | SYSTOLIC BLOOD PRESSURE: 107 MMHG | HEIGHT: 65 IN | OXYGEN SATURATION: 92 % | DIASTOLIC BLOOD PRESSURE: 75 MMHG | BODY MASS INDEX: 22.82 KG/M2 | WEIGHT: 137 LBS

## 2024-05-03 DIAGNOSIS — I70.212 ATHEROSCLER OF NATIVE ARTERY OF LEFT LEG WITH INTERMIT CLAUDICATION (HCC): Primary | ICD-10-CM

## 2024-05-03 PROCEDURE — 4004F PT TOBACCO SCREEN RCVD TLK: CPT | Performed by: SURGERY

## 2024-05-03 PROCEDURE — 3078F DIAST BP <80 MM HG: CPT | Performed by: SURGERY

## 2024-05-03 PROCEDURE — G8427 DOCREV CUR MEDS BY ELIG CLIN: HCPCS | Performed by: SURGERY

## 2024-05-03 PROCEDURE — 3017F COLORECTAL CA SCREEN DOC REV: CPT | Performed by: SURGERY

## 2024-05-03 PROCEDURE — 99203 OFFICE O/P NEW LOW 30 MIN: CPT | Performed by: SURGERY

## 2024-05-03 PROCEDURE — 3074F SYST BP LT 130 MM HG: CPT | Performed by: SURGERY

## 2024-05-03 PROCEDURE — G8420 CALC BMI NORM PARAMETERS: HCPCS | Performed by: SURGERY

## 2024-05-03 ASSESSMENT — ENCOUNTER SYMPTOMS
VOICE CHANGE: 0
CHEST TIGHTNESS: 0
EYE PAIN: 0
VOMITING: 0
TROUBLE SWALLOWING: 0
COUGH: 0
ABDOMINAL PAIN: 0
SHORTNESS OF BREATH: 1
COLOR CHANGE: 0
ABDOMINAL DISTENTION: 0

## 2024-05-03 NOTE — PROGRESS NOTES
[facility]  [address]  [place] [address]  Dept: [phone]     Patient: Barbara White  : 1970  MRN: [id number]  DOS: 5/3/2024    Referring provider:  Ludivina Grimm MD         HPI:  Barbara White is a 53 y.o. female who comes to the office for the first time regarding left lower extremity claudication mostly located in the calf and thigh.  The calf is the worst.  She has some buttock claudication as well.  She can only walk approximately 20 to 25 yards before having to stop.  She denies rest pain.  She does smoke.  She is a recovered alcoholic.  She smokes approximately half a pack per day.  She does have COPD but not diabetes.  She knows of no significant coronary arterial problems.    SEE and PVR is consistent with inflow disease on the left.  She has an ESE on the left of 0.5 and on the right it is normal.  The femoral-popliteal system seems to be patent on the left.  Past Medical History:   Diagnosis Date    Acute respiratory failure with hypoxia ([facility]) 10/16/2020    Alcohol withdrawal syndrome, with delirium ([facility]) 2019    Alcoholism ([facility])     Anal warts     Anemia 10/2020    GI bleed    Anxiety     Astrocytoma ([facility]) - diagnosed at age 17, the patient underwent 2 surgical resections without known recurrence 10/23/2020    at age 17    Bandemia     Closed fracture of lateral portion of left tibial plateau 2013    Condyloma     COPD (chronic obstructive pulmonary disease) ([facility])     CO2 retainer, on Bipap at night for this, Dr. Dueñas ( last visit 2020 and note on chart )    COVID-19 vaccine administered     Depression      major depressive disorder, ptsd, anxiety    Dysphagia     GI bleed 10/2020    Hypertension     Memory loss     Oxygen dependent     USES  3L/MIN DAILY PRN AND NIGHTLY CONTINUOUSLY    Pain, joint, ankle and foot     Pancreatic lesion 10/2020

## 2024-05-03 NOTE — TELEPHONE ENCOUNTER
From: Barbara White  To: Dr. Ludivina Grimm  Sent: 5/3/2024 10:44 AM EDT  Subject: Smoking cessation     I had lozenges at one point to help with cravings. Could i get a script for them sent to [facility] on [address]? Today if possible?

## 2024-05-06 ENCOUNTER — HOSPITAL ENCOUNTER (OUTPATIENT)
Dept: VASCULAR LAB | Age: 54
Discharge: HOME | End: 2024-05-08
Attending: SURGERY
Payer: MEDICAID

## 2024-05-06 DIAGNOSIS — I70.212 ATHEROSCLER OF NATIVE ARTERY OF LEFT LEG WITH INTERMIT CLAUDICATION (HCC): ICD-10-CM

## 2024-05-06 LAB
VAS LEFT ATA MID PSV: 19.7 CM/S
VAS LEFT CFA PROX PSV: 86.2 CM/S
VAS LEFT CFA VEL RATIO: 0.79
VAS LEFT EXT ILIAC DIST PSV: 109 CM/S
VAS LEFT PERONEAL MID PSV: 13.2 CM/S
VAS LEFT PFA PROX PSV: 256 CM/S
VAS LEFT POP A DIST PSV: 29 CM/S
VAS LEFT POP A PROX PSV: 32.9 CM/S
VAS LEFT POP A PROX VEL RATIO: 1.38
VAS LEFT PTA MID PSV: 19.9 CM/S
VAS LEFT SFA DIST PSV: 23.8 CM/S
VAS LEFT SFA DIST VEL RATIO: 0.64
VAS LEFT SFA MID PSV: 36.9 CM/S
VAS LEFT SFA MID VEL RATIO: 0.28
VAS LEFT SFA PROX PSV: 132 CM/S
VAS LEFT SFA PROX VEL RATIO: 1.53
VAS RIGHT ATA MID PSV: 52.4 CM/S
VAS RIGHT CFA PROX PSV: 159 CM/S
VAS RIGHT CFA VEL RATIO: 0.9
VAS RIGHT EXT ILIAC DIST PSV: 178 CM/S
VAS RIGHT PERONEAL MID PSV: 27.9 CM/S
VAS RIGHT PFA PROX PSV: 105 CM/S
VAS RIGHT POP A DIST PSV: 57.4 CM/S
VAS RIGHT POP A PROX PSV: 79.1 CM/S
VAS RIGHT POP A PROX VEL RATIO: 1.18
VAS RIGHT PTA MID PSV: 51.7 CM/S
VAS RIGHT SFA DIST PSV: 67.3 CM/S
VAS RIGHT SFA DIST VEL RATIO: 0.82
VAS RIGHT SFA MID PSV: 81.6 CM/S
VAS RIGHT SFA MID VEL RATIO: 0.7
VAS RIGHT SFA PROX PSV: 109 CM/S
VAS RIGHT SFA PROX VEL RATIO: 0.7

## 2024-05-06 PROCEDURE — 93925 LOWER EXTREMITY STUDY: CPT | Performed by: SURGERY

## 2024-05-06 PROCEDURE — 93925 LOWER EXTREMITY STUDY: CPT

## 2024-05-17 ENCOUNTER — OFFICE VISIT (OUTPATIENT)
Dept: VASCULAR SURGERY | Age: 54
End: 2024-05-17
Payer: MEDICAID

## 2024-05-17 VITALS
HEART RATE: 86 BPM | DIASTOLIC BLOOD PRESSURE: 88 MMHG | RESPIRATION RATE: 16 BRPM | HEIGHT: 65 IN | WEIGHT: 135 LBS | OXYGEN SATURATION: 94 % | BODY MASS INDEX: 22.49 KG/M2 | SYSTOLIC BLOOD PRESSURE: 126 MMHG

## 2024-05-17 DIAGNOSIS — I70.212 ATHEROSCLER OF NATIVE ARTERY OF LEFT LEG WITH INTERMIT CLAUDICATION (HCC): Primary | ICD-10-CM

## 2024-05-17 DIAGNOSIS — K86.0 ALCOHOL-INDUCED CHRONIC PANCREATITIS (HCC): ICD-10-CM

## 2024-05-17 PROCEDURE — 4004F PT TOBACCO SCREEN RCVD TLK: CPT | Performed by: SURGERY

## 2024-05-17 PROCEDURE — 99214 OFFICE O/P EST MOD 30 MIN: CPT | Performed by: SURGERY

## 2024-05-17 PROCEDURE — 3079F DIAST BP 80-89 MM HG: CPT | Performed by: SURGERY

## 2024-05-17 PROCEDURE — 3074F SYST BP LT 130 MM HG: CPT | Performed by: SURGERY

## 2024-05-17 PROCEDURE — G8427 DOCREV CUR MEDS BY ELIG CLIN: HCPCS | Performed by: SURGERY

## 2024-05-17 PROCEDURE — G8420 CALC BMI NORM PARAMETERS: HCPCS | Performed by: SURGERY

## 2024-05-17 PROCEDURE — 3017F COLORECTAL CA SCREEN DOC REV: CPT | Performed by: SURGERY

## 2024-05-17 RX ORDER — CLOPIDOGREL BISULFATE 75 MG/1
75 TABLET ORAL DAILY
Qty: 90 TABLET | Refills: 5 | Status: SHIPPED | OUTPATIENT
Start: 2024-05-17

## 2024-05-17 RX ORDER — NICOTINE TRANSDERMAL SYSTEM 14 MG/24H
1 PATCH, EXTENDED RELEASE TRANSDERMAL DAILY
Qty: 1 PATCH | Refills: 3 | Status: SHIPPED | OUTPATIENT
Start: 2024-05-17

## 2024-05-17 RX ORDER — ASPIRIN 81 MG/1
81 TABLET ORAL DAILY
Qty: 90 TABLET | Refills: 5 | Status: SHIPPED | OUTPATIENT
Start: 2024-05-17

## 2024-05-17 NOTE — TELEPHONE ENCOUNTER
Last visit: 4/4/24  Last Med refill: 4/18/24  Does patient have enough medication for 72 hours: No:     Next Visit Date:  Future Appointments   Date Time Provider Department Center   6/4/2024 10:00 AM STV CT [facility] CT [facility]V Radiolog   6/13/2024  9:00 AM Donato Dueñas MD Resp Spec [facility]   8/5/2024 10:30 AM Ludivina Grimm MD [facility]       Health Maintenance   Topic Date Due    Hepatitis B vaccine (1 of 3 - 3-dose series) Never done    COVID-19 Vaccine (5 - 2023-24 season) 01/24/2025 (Originally 9/1/2023)    Lipids  10/04/2024    Low dose CT lung screening &/or counseling  01/10/2025    Depression Monitoring  01/24/2025    Breast cancer screen  08/14/2025    DTaP/Tdap/Td vaccine (2 - Td or Tdap) 12/28/2030    Colorectal Cancer Screen  03/14/2034    Flu vaccine  Completed    Shingles vaccine  Completed    Pneumococcal 0-64 years Vaccine  Completed    Hepatitis C screen  Completed    HIV screen  Completed    Hepatitis A vaccine  Aged Out    Hib vaccine  Aged Out    Polio vaccine  Aged Out    Meningococcal (ACWY) vaccine  Aged Out       Hemoglobin A1C (%)   Date Value   04/13/2021 5.5   07/14/2019 4.3             ( goal A1C is < 7)   No components found for: \"LABMICR\"  No components found for: \"LDLCHOLESTEROL\", \"LDLCALC\"    (goal LDL is <100)   AST (U/L)   Date Value   06/06/2023 14     ALT (U/L)   Date Value   06/06/2023 <5 (L)     BUN (mg/dL)   Date Value   10/04/2023 10     BP Readings from Last 3 Encounters:   05/17/24 126/88   05/03/24 107/75   04/04/24 120/88          (goal 120/80)    All Future Testing planned in [facility]  Lab Frequency Next Occurrence   EGD Once 07/07/2023   CT CHEST LOW DOSE (LDCT) Once 07/23/2024   EGD Routine Once 03/05/2024   COLONOSCOPY (Diagnostic) Once 03/05/2024   CT CHEST WO CONTRAST Once 02/06/2024               Patient Active Problem List:     Acute bronchitis     Reflex sympathetic dystrophy of lower limb     Essential hypertension     Nicotine addiction

## 2024-05-17 NOTE — TELEPHONE ENCOUNTER
Last visit: 2/6/24  Next Visit: 6/13/24    Patient requesting refills for Nicotine Patch. Please see pended script and advise.

## 2024-05-17 NOTE — PROGRESS NOTES
[facility]  [address]  [place] [address]  Dept: [phone]     Patient: Barbara White  : 1970  MRN: [id number]  DOS: 2024    Referring provider:  Ludivina Grimm MD         HPI:  Barbara White is a 53 y.o. female who returns to the office for left lower extremity claudication at very short distances.  She can walk approximately 50 feet before having to stop and she can walk no further.  She does deny rest pain in the left lower extremity.  She had an ESE and PVR revealing an ESE on the left of 0.5.  It is roughly normal on the right.  She has evidence of SFA occlusion on the left on the ESE and PVR as well as some inflow disease.  Duplex examination reveals an occluded SFA on the left as well as a stenosed profundofemoral artery.  She is continuing to smoke about 6 cigarettes/day which is a significant reduction from her previous level when I saw her several weeks ago.  Past Medical History:   Diagnosis Date    Acute respiratory failure with hypoxia ([facility]) 10/16/2020    Alcohol withdrawal syndrome, with delirium ([facility]) 2019    Alcoholism ([facility])     Anal warts     Anemia 10/2020    GI bleed    Anxiety     Astrocytoma ([facility]) - diagnosed at age 17, the patient underwent 2 surgical resections without known recurrence 10/23/2020    at age 17    Bandemia     Closed fracture of lateral portion of left tibial plateau 2013    Condyloma     COPD (chronic obstructive pulmonary disease) ([facility])     CO2 retainer, on Bipap at night for this, Dr. Dueñas ( last visit 2020 and note on chart )    COVID-19 vaccine administered     Depression      major depressive disorder, ptsd, anxiety    Dysphagia     GI bleed 10/2020    Hypertension     Memory loss     Oxygen dependent     USES  3L/MIN DAILY PRN AND NIGHTLY CONTINUOUSLY    Pain, joint, ankle and foot     Pancreatic lesion 10/2020

## 2024-05-20 ENCOUNTER — PATIENT MESSAGE (OUTPATIENT)
Dept: FAMILY MEDICINE CLINIC | Age: 54
End: 2024-05-20

## 2024-05-20 DIAGNOSIS — I10 ESSENTIAL HYPERTENSION: ICD-10-CM

## 2024-05-20 DIAGNOSIS — E87.6 HYPOKALEMIA: ICD-10-CM

## 2024-05-20 DIAGNOSIS — N39.41 URGE INCONTINENCE OF URINE: ICD-10-CM

## 2024-05-20 DIAGNOSIS — E87.1 HYPONATREMIA: ICD-10-CM

## 2024-05-20 RX ORDER — PANCRELIPASE 24000; 76000; 120000 [USP'U]/1; [USP'U]/1; [USP'U]/1
CAPSULE, DELAYED RELEASE PELLETS ORAL
Qty: 90 CAPSULE | Refills: 0 | Status: SHIPPED | OUTPATIENT
Start: 2024-05-20

## 2024-05-21 RX ORDER — SODIUM CHLORIDE 1 G/1
TABLET ORAL
Qty: 84 TABLET | Refills: 5 | Status: SHIPPED | OUTPATIENT
Start: 2024-05-21

## 2024-05-21 NOTE — TELEPHONE ENCOUNTER
From: Barbara White  To: Dr. Ludivina Grimm  Sent: 5/20/2024 9:51 AM EDT  Subject: Sodium chloride 1g 3 times daily.     I need a refill on this medication. Could you call it in so i don't have to get a ride to his office in [place]. I don't have a car. Pretty please.  
Is she still seeing nephrology for the low sodium? If not, should schedule follow-up so we can try to wean her down   
hypertension     Nicotine addiction     Psychophysiological insomnia     Anxiety     Alcoholic peripheral neuropathy (HCC)     Hypokalemia     Macrocytic anemia     Hypomagnesemia     Tobacco use     Ambulatory dysfunction     Seizure disorder (HCC)     COPD exacerbation (HCC)     Paresthesia of lower extremity     Acute respiratory failure with hypoxia (HCC)     Pancreatic cyst     Recurrent major depressive disorder (HCC)     Elevated CA 19-9 level     Perineal mass, female     Esophagitis candidal      Condyloma     Astrocytoma (HCC) - diagnosed at age 17, the patient underwent 2 surgical resections without known recurrence     Alcohol use disorder, severe, in early remission (HCC)     Metabolic encephalopathy     AMAN (generalized anxiety disorder)     Major depressive disorder, recurrent severe without psychotic features (HCC)     Esophageal dysphagia     Chronic peripheral neuropathic pain     History of alcohol abuse     History of petit-mal seizures     Intractable episodic tension-type headache     Personal history of astrocytoma     Multilevel spine pain     Chronic pain syndrome     Marijuana abuse     Severe sepsis (HCC)     Closed fracture of fifth thoracic vertebra (HCC)     Diverticulitis of large intestine with abscess without bleeding     Elevated alkaline phosphatase level     Chronic pancreatitis (HCC)     Erythema ab igne     Compression fracture of thoracic vertebra (HCC)     Pathological compression fracture of lumbar vertebra with delayed healing     Metabolic acidosis with increased anion gap and accumulation of organic acids     Pneumonia of both lungs due to infectious organism     Septicemia (HCC)     Alcohol-induced acute pancreatitis     Fluid collection of pancreas     Diverticulitis of large intestine without perforation or abscess with bleeding     Pseudocyst of pancreas     Sepsis (HCC)     Acute recurrent pancreatitis     Atelectasis of left lung     Hyponatremia     Iron deficiency

## 2024-05-23 ENCOUNTER — PATIENT MESSAGE (OUTPATIENT)
Dept: FAMILY MEDICINE CLINIC | Age: 54
End: 2024-05-23

## 2024-05-23 NOTE — TELEPHONE ENCOUNTER
Pt just wanting to know if she needs labs done before having her angiogram done and before stopping the sodium tabs?

## 2024-05-23 NOTE — TELEPHONE ENCOUNTER
From: Barbara White  To: Dr. Ludivina Cain  Sent: 5/23/2024 10:51 AM EDT  Subject: Appointment Request    Appointment Request From: Barbara White    With Provider: LUDIVINA CAIN MD [[facility]]    Preferred Date Range: Any    Preferred Times: Any Time    Reason for visit: Request an Appointment    Comments:  To wean off sodium tabs    Should i have lab work first?

## 2024-05-30 DIAGNOSIS — E87.1 HYPONATREMIA: Primary | ICD-10-CM

## 2024-06-04 ENCOUNTER — HOSPITAL ENCOUNTER (OUTPATIENT)
Age: 54
Discharge: HOME | End: 2024-06-04
Payer: MEDICAID

## 2024-06-04 ENCOUNTER — OFFICE VISIT (OUTPATIENT)
Dept: FAMILY MEDICINE CLINIC | Age: 54
End: 2024-06-04
Payer: MEDICAID

## 2024-06-04 ENCOUNTER — HOSPITAL ENCOUNTER (OUTPATIENT)
Age: 54
Discharge: HOME | End: 2024-06-06
Attending: INTERNAL MEDICINE
Payer: MEDICAID

## 2024-06-04 VITALS
HEART RATE: 81 BPM | WEIGHT: 133 LBS | SYSTOLIC BLOOD PRESSURE: 108 MMHG | DIASTOLIC BLOOD PRESSURE: 68 MMHG | BODY MASS INDEX: 22.13 KG/M2 | OXYGEN SATURATION: 96 %

## 2024-06-04 DIAGNOSIS — R91.1 LUNG NODULE: ICD-10-CM

## 2024-06-04 DIAGNOSIS — K86.0 ALCOHOL-INDUCED CHRONIC PANCREATITIS (HCC): ICD-10-CM

## 2024-06-04 DIAGNOSIS — E87.5 HYPERKALEMIA: ICD-10-CM

## 2024-06-04 DIAGNOSIS — M48.56XD NON-TRAUMATIC COMPRESSION FRACTURE OF L1 LUMBAR VERTEBRA WITH ROUTINE HEALING, SUBSEQUENT ENCOUNTER: ICD-10-CM

## 2024-06-04 DIAGNOSIS — G62.1 ALCOHOLIC PERIPHERAL NEUROPATHY (HCC): ICD-10-CM

## 2024-06-04 DIAGNOSIS — I10 ESSENTIAL HYPERTENSION: Primary | ICD-10-CM

## 2024-06-04 DIAGNOSIS — M80.80XD OTHER OSTEOPOROSIS WITH CURRENT PATHOLOGICAL FRACTURE WITH ROUTINE HEALING, SUBSEQUENT ENCOUNTER: ICD-10-CM

## 2024-06-04 DIAGNOSIS — F33.2 MAJOR DEPRESSIVE DISORDER, RECURRENT SEVERE WITHOUT PSYCHOTIC FEATURES (HCC): ICD-10-CM

## 2024-06-04 DIAGNOSIS — G89.4 CHRONIC PAIN SYNDROME: ICD-10-CM

## 2024-06-04 DIAGNOSIS — E87.1 HYPONATREMIA: ICD-10-CM

## 2024-06-04 DIAGNOSIS — E83.42 HYPOMAGNESEMIA: ICD-10-CM

## 2024-06-04 DIAGNOSIS — Z72.0 TOBACCO USE: ICD-10-CM

## 2024-06-04 PROBLEM — R65.20 SEVERE SEPSIS (HCC): Status: RESOLVED | Noted: 2021-07-21 | Resolved: 2024-06-04

## 2024-06-04 PROBLEM — K59.00 CONSTIPATION: Status: RESOLVED | Noted: 2023-12-04 | Resolved: 2024-06-04

## 2024-06-04 PROBLEM — K57.20 DIVERTICULITIS OF LARGE INTESTINE WITH ABSCESS WITHOUT BLEEDING: Status: RESOLVED | Noted: 2021-07-22 | Resolved: 2024-06-04

## 2024-06-04 PROBLEM — K85.90 ACUTE RECURRENT PANCREATITIS: Status: RESOLVED | Noted: 2021-08-09 | Resolved: 2024-06-04

## 2024-06-04 PROBLEM — A41.9 SEVERE SEPSIS (HCC): Status: RESOLVED | Noted: 2021-07-21 | Resolved: 2024-06-04

## 2024-06-04 PROBLEM — A41.9 SEPSIS (HCC): Status: RESOLVED | Noted: 2021-08-09 | Resolved: 2024-06-04

## 2024-06-04 PROBLEM — K85.20 ALCOHOL-INDUCED ACUTE PANCREATITIS: Status: RESOLVED | Noted: 2021-07-31 | Resolved: 2024-06-04

## 2024-06-04 PROBLEM — E87.6 HYPOKALEMIA: Status: RESOLVED | Noted: 2019-12-14 | Resolved: 2024-06-04

## 2024-06-04 PROBLEM — J96.21 ACUTE ON CHRONIC RESPIRATORY FAILURE WITH HYPOXIA (HCC): Status: RESOLVED | Noted: 2023-01-01 | Resolved: 2024-06-04

## 2024-06-04 PROBLEM — J18.9 PNEUMONIA OF BOTH LUNGS DUE TO INFECTIOUS ORGANISM: Status: RESOLVED | Noted: 2021-07-29 | Resolved: 2024-06-04

## 2024-06-04 PROBLEM — F10.21 ALCOHOL USE DISORDER, SEVERE, IN EARLY REMISSION (HCC): Status: RESOLVED | Noted: 2020-10-23 | Resolved: 2024-06-04

## 2024-06-04 PROBLEM — J98.11 ATELECTASIS OF LEFT LUNG: Status: RESOLVED | Noted: 2021-08-09 | Resolved: 2024-06-04

## 2024-06-04 PROBLEM — R41.82 ALTERED MENTAL STATUS: Status: RESOLVED | Noted: 2023-01-08 | Resolved: 2024-06-04

## 2024-06-04 LAB
ANION GAP SERPL CALCULATED.3IONS-SCNC: 10 MMOL/L (ref 9–16)
BUN SERPL-MCNC: 9 MG/DL (ref 6–20)
CALCIUM SERPL-MCNC: 8.6 MG/DL (ref 8.6–10.4)
CHLORIDE SERPL-SCNC: 105 MMOL/L (ref 98–107)
CO2 SERPL-SCNC: 22 MMOL/L (ref 20–31)
CREAT SERPL-MCNC: 0.6 MG/DL (ref 0.5–0.9)
GFR, ESTIMATED: >90 ML/MIN/1.73M2
GLUCOSE SERPL-MCNC: 93 MG/DL (ref 74–99)
POTASSIUM SERPL-SCNC: 5.7 MMOL/L (ref 3.7–5.3)
SODIUM SERPL-SCNC: 137 MMOL/L (ref 136–145)

## 2024-06-04 PROCEDURE — 3078F DIAST BP <80 MM HG: CPT | Performed by: INTERNAL MEDICINE

## 2024-06-04 PROCEDURE — 3074F SYST BP LT 130 MM HG: CPT | Performed by: INTERNAL MEDICINE

## 2024-06-04 PROCEDURE — 71250 CT THORAX DX C-: CPT

## 2024-06-04 PROCEDURE — G8427 DOCREV CUR MEDS BY ELIG CLIN: HCPCS | Performed by: INTERNAL MEDICINE

## 2024-06-04 PROCEDURE — 99214 OFFICE O/P EST MOD 30 MIN: CPT | Performed by: INTERNAL MEDICINE

## 2024-06-04 PROCEDURE — G8420 CALC BMI NORM PARAMETERS: HCPCS | Performed by: INTERNAL MEDICINE

## 2024-06-04 PROCEDURE — 4004F PT TOBACCO SCREEN RCVD TLK: CPT | Performed by: INTERNAL MEDICINE

## 2024-06-04 PROCEDURE — 36415 COLL VENOUS BLD VENIPUNCTURE: CPT

## 2024-06-04 PROCEDURE — 3017F COLORECTAL CA SCREEN DOC REV: CPT | Performed by: INTERNAL MEDICINE

## 2024-06-04 PROCEDURE — 80048 BASIC METABOLIC PNL TOTAL CA: CPT

## 2024-06-04 RX ORDER — PANCRELIPASE 24000; 76000; 120000 [USP'U]/1; [USP'U]/1; [USP'U]/1
1 CAPSULE, DELAYED RELEASE PELLETS ORAL
Qty: 90 CAPSULE | Refills: 3 | Status: SHIPPED | OUTPATIENT
Start: 2024-06-04

## 2024-06-04 RX ORDER — DENOSUMAB 60 MG/ML
60 INJECTION SUBCUTANEOUS
Qty: 1 ML | Refills: 1 | Status: SHIPPED | OUTPATIENT
Start: 2024-06-04

## 2024-06-06 ENCOUNTER — TELEPHONE (OUTPATIENT)
Dept: INFUSION THERAPY | Facility: MEDICAL CENTER | Age: 54
End: 2024-06-06

## 2024-06-06 ENCOUNTER — CLINICAL DOCUMENTATION (OUTPATIENT)
Facility: HOSPITAL | Age: 54
End: 2024-06-06

## 2024-06-06 NOTE — TELEPHONE ENCOUNTER
New order 6/5/24  Dr. Ludivina Grimm  Prolia 60 mg SQ every 6 months  LABS: BMP  Order noted. Chart to front office for processing.

## 2024-06-07 ENCOUNTER — HOSPITAL ENCOUNTER (OUTPATIENT)
Age: 54
Setting detail: SPECIMEN
Discharge: HOME | End: 2024-06-07

## 2024-06-07 DIAGNOSIS — E87.5 HYPERKALEMIA: ICD-10-CM

## 2024-06-07 DIAGNOSIS — E83.42 HYPOMAGNESEMIA: ICD-10-CM

## 2024-06-07 LAB
ANION GAP SERPL CALCULATED.3IONS-SCNC: 9 MMOL/L (ref 9–16)
BUN SERPL-MCNC: 11 MG/DL (ref 6–20)
CALCIUM SERPL-MCNC: 9 MG/DL (ref 8.6–10.4)
CHLORIDE SERPL-SCNC: 98 MMOL/L (ref 98–107)
CO2 SERPL-SCNC: 26 MMOL/L (ref 20–31)
CREAT SERPL-MCNC: 0.7 MG/DL (ref 0.5–0.9)
GFR, ESTIMATED: >90 ML/MIN/1.73M2
GLUCOSE SERPL-MCNC: 73 MG/DL (ref 74–99)
MAGNESIUM SERPL-MCNC: 1.6 MG/DL (ref 1.6–2.6)
POTASSIUM SERPL-SCNC: 5.2 MMOL/L (ref 3.7–5.3)
SODIUM SERPL-SCNC: 133 MMOL/L (ref 136–145)

## 2024-06-11 DIAGNOSIS — R91.1 LUNG NODULE: Primary | ICD-10-CM

## 2024-06-11 ASSESSMENT — ENCOUNTER SYMPTOMS
BLOOD IN STOOL: 0
CHEST TIGHTNESS: 0
NAUSEA: 0
ABDOMINAL PAIN: 0
COUGH: 0
ANAL BLEEDING: 0
WHEEZING: 0
VOMITING: 0
CHOKING: 0
CONSTIPATION: 0
DIARRHEA: 0
SHORTNESS OF BREATH: 0

## 2024-06-11 ASSESSMENT — VISUAL ACUITY: OU: 1

## 2024-06-12 ENCOUNTER — OFFICE VISIT (OUTPATIENT)
Dept: OPERATING ROOM | Age: 54
End: 2024-06-12
Attending: SURGERY

## 2024-06-12 DIAGNOSIS — K86.0 ALCOHOL-INDUCED CHRONIC PANCREATITIS (HCC): ICD-10-CM

## 2024-06-12 RX ORDER — ROPINIROLE 1 MG/1
1 TABLET, FILM COATED ORAL NIGHTLY
Qty: 28 TABLET | Refills: 0 | Status: SHIPPED | OUTPATIENT
Start: 2024-06-12

## 2024-06-12 RX ORDER — PANCRELIPASE 24000; 76000; 120000 [USP'U]/1; [USP'U]/1; [USP'U]/1
CAPSULE, DELAYED RELEASE PELLETS ORAL
Qty: 84 CAPSULE | Refills: 0 | OUTPATIENT
Start: 2024-06-12

## 2024-06-12 NOTE — TELEPHONE ENCOUNTER
Last visit: 06/04/2024  Last Med refill: 05/23/2024  Does patient have enough medication for 72 hours: Yes    Next Visit Date:  Future Appointments   Date Time Provider Department Center   7/15/2024  4:00 PM STA DEXA RM [facility]AZ M[facility]MO [facility] Radiolog   9/26/2024 11:20 AM Donato Dueñas MD Resp Spec [id number]   10/16/2024  3:15 PM Ludivina Grimm MD [facility]       Health Maintenance   Topic Date Due    Hepatitis B vaccine (1 of 3 - 3-dose series) Never done    COVID-19 Vaccine (5 - 2023-24 season) 01/24/2025 (Originally 9/1/2023)    Lipids  10/04/2024    Low dose CT lung screening &/or counseling  01/10/2025    Depression Monitoring  01/24/2025    Breast cancer screen  08/14/2025    DTaP/Tdap/Td vaccine (2 - Td or Tdap) 12/28/2030    Colorectal Cancer Screen  03/14/2034    Flu vaccine  Completed    Shingles vaccine  Completed    Pneumococcal 0-64 years Vaccine  Completed    Hepatitis C screen  Completed    HIV screen  Completed    Hepatitis A vaccine  Aged Out    Hib vaccine  Aged Out    Polio vaccine  Aged Out    Meningococcal (ACWY) vaccine  Aged Out       Hemoglobin A1C (%)   Date Value   04/13/2021 5.5   07/14/2019 4.3             ( goal A1C is < 7)   No components found for: \"LABMICR\"  No components found for: \"LDLCHOLESTEROL\", \"LDLCALC\"    (goal LDL is <100)   AST (U/L)   Date Value   06/06/2023 14     ALT (U/L)   Date Value   06/06/2023 <5 (L)     BUN (mg/dL)   Date Value   06/07/2024 11     BP Readings from Last 3 Encounters:   06/04/24 108/68   05/17/24 126/88   05/03/24 107/75          (goal 120/80)    All Future Testing planned in [facility]  Lab Frequency Next Occurrence   CT CHEST LOW DOSE (LDCT) Once 07/23/2024   EGD Routine Once 03/05/2024   COLONOSCOPY (Diagnostic) Once 03/05/2024   DEXA BONE DENSITY 2 SITES Once 06/04/2024   CT CHEST WO CONTRAST Once 06/11/2024               Patient Active Problem List:     Acute bronchitis     Reflex sympathetic dystrophy of lower limb     Essential

## 2024-06-24 ENCOUNTER — TELEPHONE (OUTPATIENT)
Dept: GASTROENTEROLOGY | Age: 54
End: 2024-06-24

## 2024-06-24 DIAGNOSIS — R13.19 ESOPHAGEAL DYSPHAGIA: ICD-10-CM

## 2024-06-24 DIAGNOSIS — K31.84 GASTROPARESIS: ICD-10-CM

## 2024-06-24 RX ORDER — OMEPRAZOLE 40 MG/1
CAPSULE, DELAYED RELEASE ORAL
Qty: 60 CAPSULE | Refills: 2 | Status: SHIPPED | OUTPATIENT
Start: 2024-06-24

## 2024-06-24 RX ORDER — METOCLOPRAMIDE 5 MG/1
TABLET ORAL
Qty: 120 TABLET | Refills: 2 | Status: SHIPPED | OUTPATIENT
Start: 2024-06-24

## 2024-06-24 RX ORDER — SUCRALFATE 1 G/1
TABLET ORAL
Qty: 120 TABLET | Refills: 2 | Status: SHIPPED | OUTPATIENT
Start: 2024-06-24

## 2024-06-24 NOTE — TELEPHONE ENCOUNTER
Pharmacy called requesting refills of  Reglan 5 mg tablets , Omeprazole 40 mg, and Sucralfate  1 GM

## 2024-07-15 RX ORDER — ROPINIROLE 1 MG/1
1 TABLET, FILM COATED ORAL NIGHTLY
Qty: 90 TABLET | Refills: 1 | Status: SHIPPED | OUTPATIENT
Start: 2024-07-15

## 2024-07-15 NOTE — TELEPHONE ENCOUNTER
Nicotine addiction     Psychophysiological insomnia     Anxiety     Alcoholic peripheral neuropathy (HCC)     Macrocytic anemia     Tobacco use     Ambulatory dysfunction     Seizure disorder (HCC)     COPD exacerbation (HCC)     Paresthesia of lower extremity     Acute respiratory failure with hypoxia (HCC)     Pancreatic cyst     Recurrent major depressive disorder (HCC)     Elevated CA 19-9 level     Perineal mass, female     Esophagitis candidal      Condyloma     Astrocytoma (HCC) - diagnosed at age 17, the patient underwent 2 surgical resections without known recurrence     AMAN (generalized anxiety disorder)     Major depressive disorder, recurrent severe without psychotic features (HCC)     Esophageal dysphagia     Chronic peripheral neuropathic pain     History of alcohol abuse     History of petit-mal seizures     Intractable episodic tension-type headache     Personal history of astrocytoma     Multilevel spine pain     Chronic pain syndrome     Marijuana abuse     Closed fracture of fifth thoracic vertebra (HCC)     Elevated alkaline phosphatase level     Chronic pancreatitis (HCC)     Erythema ab igne     Compression fracture of thoracic vertebra (HCC)     Pathological compression fracture of lumbar vertebra with delayed healing     Metabolic acidosis with increased anion gap and accumulation of organic acids     Fluid collection of pancreas     Pseudocyst of pancreas     Hyponatremia     Iron deficiency anemia     Adenomatous polyp of ascending colon     Moderate episode of recurrent major depressive disorder (HCC)     Sleep disturbance     Intractable migraine without aura and without status migrainosus     History of colon polyps

## 2024-08-02 ENCOUNTER — TELEPHONE (OUTPATIENT)
Dept: ONCOLOGY | Age: 54
End: 2024-08-02

## 2024-08-02 ENCOUNTER — PATIENT MESSAGE (OUTPATIENT)
Dept: GASTROENTEROLOGY | Age: 54
End: 2024-08-02

## 2024-08-02 ENCOUNTER — TELEPHONE (OUTPATIENT)
Dept: GASTROENTEROLOGY | Age: 54
End: 2024-08-02

## 2024-08-02 DIAGNOSIS — R13.19 ESOPHAGEAL DYSPHAGIA: ICD-10-CM

## 2024-08-02 DIAGNOSIS — K31.84 GASTROPARESIS: ICD-10-CM

## 2024-08-02 DIAGNOSIS — K86.0 ALCOHOL-INDUCED CHRONIC PANCREATITIS (HCC): ICD-10-CM

## 2024-08-02 RX ORDER — OMEPRAZOLE 40 MG/1
CAPSULE, DELAYED RELEASE ORAL
Qty: 180 CAPSULE | Refills: 2 | Status: SHIPPED | OUTPATIENT
Start: 2024-08-02

## 2024-08-02 RX ORDER — PANCRELIPASE 24000; 76000; 120000 [USP'U]/1; [USP'U]/1; [USP'U]/1
1 CAPSULE, DELAYED RELEASE PELLETS ORAL
Qty: 270 CAPSULE | Refills: 3 | Status: SHIPPED | OUTPATIENT
Start: 2024-08-02 | End: 2024-10-31

## 2024-08-02 RX ORDER — SUCRALFATE 1 G/1
TABLET ORAL
Qty: 360 TABLET | Refills: 3 | Status: SHIPPED | OUTPATIENT
Start: 2024-08-02

## 2024-08-02 RX ORDER — METOCLOPRAMIDE 5 MG/1
TABLET ORAL
Qty: 360 TABLET | Refills: 2 | Status: SHIPPED | OUTPATIENT
Start: 2024-08-02

## 2024-08-02 NOTE — TELEPHONE ENCOUNTER
Pt called office requesting medication refills. Writer told pt she can take message and send to Dr. Lees, however if she has any medication questions, writer will be unable to read response from Dr. Lees, as writer is not a LPN or MA. Writer told pt there may be a delay in a response, if that is the case. Pt said she is just calling for a refill. Pt appreciated writer being honest and letting her know.    Pt said Dr. Lees prescribes her Creon. Pt said she feels she is having to call every month to get her refills. Pt is requesting 3 month refills for all of her medication from Dr. Lees.     Writer reviewed medication sent by Dr. Lees. Scripts were sent on 06/24/24 to [facility] with refills attached. Pt confirmed pharmacy is [facility] on [address].    Pt said she needs Prilosec 40 mg capsule, Creon, Reglan 5 mg tablet and Carafate 1 gm tablet. Pt said even though refills were sent with scripts on 06/24, pt said she wants refills sent over now, so she doesn't need to call back in another month.     Writer reviewed last med refill tel encounter. Dr. Lees said pt needs a f/u once a year. Pt's last appt with Yoana was on 02/05/24. Schedule for next year is not open yet for February.

## 2024-08-02 NOTE — TELEPHONE ENCOUNTER
Writer tried calling pt, no answer. Call rang and rang then went to a dial tone. No answer. Unable to leave message.     Writer will send pt [facility] message letting her know her scripts were sent.

## 2024-08-02 NOTE — TELEPHONE ENCOUNTER
PER PRECERT, PT NEEDS TO HAVE A DEXA SCAN DONE TO SUBMIT FOR AUTH TO INSURANCE FOR PROLIA INJECTION. PT CANCELED 1 DEXA SCAN APPT AND NO SHOWED FOR THE OTHER. WHEN PT GETS SCAN AND I RECEIVE AN AUTH I WILL CALL PT TO SCHEDULE HER TX.

## 2024-08-06 ENCOUNTER — OFFICE VISIT (OUTPATIENT)
Dept: FAMILY MEDICINE CLINIC | Age: 54
End: 2024-08-06
Payer: MEDICARE

## 2024-08-06 VITALS
HEART RATE: 89 BPM | BODY MASS INDEX: 22.8 KG/M2 | DIASTOLIC BLOOD PRESSURE: 70 MMHG | OXYGEN SATURATION: 98 % | SYSTOLIC BLOOD PRESSURE: 122 MMHG | WEIGHT: 137 LBS

## 2024-08-06 DIAGNOSIS — M54.6 ACUTE LEFT-SIDED THORACIC BACK PAIN: Primary | ICD-10-CM

## 2024-08-06 PROCEDURE — G8427 DOCREV CUR MEDS BY ELIG CLIN: HCPCS | Performed by: NURSE PRACTITIONER

## 2024-08-06 PROCEDURE — 3078F DIAST BP <80 MM HG: CPT | Performed by: NURSE PRACTITIONER

## 2024-08-06 PROCEDURE — 3017F COLORECTAL CA SCREEN DOC REV: CPT | Performed by: NURSE PRACTITIONER

## 2024-08-06 PROCEDURE — 4004F PT TOBACCO SCREEN RCVD TLK: CPT | Performed by: NURSE PRACTITIONER

## 2024-08-06 PROCEDURE — 99213 OFFICE O/P EST LOW 20 MIN: CPT | Performed by: NURSE PRACTITIONER

## 2024-08-06 PROCEDURE — 3074F SYST BP LT 130 MM HG: CPT | Performed by: NURSE PRACTITIONER

## 2024-08-06 PROCEDURE — G8420 CALC BMI NORM PARAMETERS: HCPCS | Performed by: NURSE PRACTITIONER

## 2024-08-06 ASSESSMENT — ENCOUNTER SYMPTOMS
BACK PAIN: 1
COUGH: 0
SHORTNESS OF BREATH: 0

## 2024-08-06 NOTE — PROGRESS NOTES
Patient is present complaining of middle back pain x1 month  States the pain is on the left side  States walking makes the pain worse  Patient states she does not want medication, would like a referral to a specialist

## 2024-08-06 NOTE — PROGRESS NOTES
Barbara White (:  1970) is a 54 y.o. female,Established patient, here for evaluation of the following chief complaint(s):  No chief complaint on file.      Assessment & Plan   ASSESSMENT/PLAN:  1. Acute left-sided thoracic back pain  -     External Referral To Physical Therapy      No follow-ups on file.         Subjective   SUBJECTIVE/OBJECTIVE:  HPI  Pt states she has been asked by her vascular doctor to stop smoking and walk more.  The more she walks the more she walks the more one spot in her back hurts.  This has been worsening for at least a month. Tylenol and ibuprofen don't help at all. She denies injury or over use of this area.     Review of Systems   Constitutional:  Negative for chills and fever.   Respiratory:  Negative for cough and shortness of breath.    Cardiovascular:  Negative for chest pain, palpitations and leg swelling.   Musculoskeletal:  Positive for back pain.     Objective   Vitals:    24 0854   BP: 122/70   Pulse: 89   SpO2: 98%     Physical Exam  Constitutional:       Appearance: She is well-developed.   HENT:      Right Ear: External ear normal.      Left Ear: External ear normal.      Nose: Nose normal.   Cardiovascular:      Rate and Rhythm: Normal rate and regular rhythm.      Heart sounds: Normal heart sounds, S1 normal and S2 normal.   Pulmonary:      Effort: Pulmonary effort is normal. No respiratory distress.      Breath sounds: Normal breath sounds.   Musculoskeletal:         General: No deformity. Normal range of motion.      Cervical back: Full passive range of motion without pain and normal range of motion.      Thoracic back: Normal.   Skin:     General: Skin is warm and dry.   Neurological:      Mental Status: She is alert and oriented to person, place, and time.            An electronic signature was used to authenticate this note.    --DRE PLAZA, APRN - CNP

## 2024-08-22 DIAGNOSIS — G43.009 MIGRAINE WITHOUT AURA AND WITHOUT STATUS MIGRAINOSUS, NOT INTRACTABLE: ICD-10-CM

## 2024-08-22 RX ORDER — RIZATRIPTAN BENZOATE 10 MG/1
TABLET ORAL
Qty: 9 TABLET | Refills: 0 | Status: SHIPPED | OUTPATIENT
Start: 2024-08-22

## 2024-08-22 NOTE — TELEPHONE ENCOUNTER
Pharmacy requesting refill of Rizatriptan Maxalt 10 MG Tablet.      Medication active on med list yes      Date of last Rx: 02/22/2024 with 5 refills          verified by HUY DE LA PAZ      Date of last appointment 02/22/2024    Next Visit Date:  Visit date not found

## 2024-08-28 DIAGNOSIS — G43.009 MIGRAINE WITHOUT AURA AND WITHOUT STATUS MIGRAINOSUS, NOT INTRACTABLE: ICD-10-CM

## 2024-08-28 RX ORDER — RIZATRIPTAN BENZOATE 10 MG/1
TABLET ORAL
Qty: 9 TABLET | Refills: 0 | OUTPATIENT
Start: 2024-08-28

## 2024-09-04 ENCOUNTER — TELEPHONE (OUTPATIENT)
Dept: PULMONOLOGY | Age: 54
End: 2024-09-04

## 2024-09-04 ENCOUNTER — PATIENT MESSAGE (OUTPATIENT)
Dept: FAMILY MEDICINE CLINIC | Age: 54
End: 2024-09-04

## 2024-09-04 NOTE — TELEPHONE ENCOUNTER
Pt's repeat CT scheduled to be done on 9/6/24 was cancelled since the last CT was on 6/4/24.   In result note from 6/11/24 Dr. Dueñas wanted pt to repeat CT and f/u in 4 months.   Pt is scheduled to f/u with Dr. Dueñas on 11/12/24. The expiration date on the active CT order is 9/11/24. Please let me know when pt should have repeat CT? Pt is currently scheduled to f/u on 11/12/24 but appt can be changed if needed.

## 2024-09-18 DIAGNOSIS — G43.009 MIGRAINE WITHOUT AURA AND WITHOUT STATUS MIGRAINOSUS, NOT INTRACTABLE: ICD-10-CM

## 2024-09-18 DIAGNOSIS — G40.909 SEIZURE DISORDER (HCC): ICD-10-CM

## 2024-09-18 RX ORDER — FLUTICASONE PROPIONATE 50 MCG
2 SPRAY, SUSPENSION (ML) NASAL DAILY
Qty: 1 EACH | Refills: 4 | Status: SHIPPED | OUTPATIENT
Start: 2024-09-18

## 2024-09-18 RX ORDER — NICOTINE TRANSDERMAL SYSTEM 14 MG/24H
1 PATCH, EXTENDED RELEASE TRANSDERMAL DAILY
Qty: 30 PATCH | Refills: 3 | OUTPATIENT
Start: 2024-09-18

## 2024-09-20 RX ORDER — TOPIRAMATE 50 MG/1
TABLET, FILM COATED ORAL
Qty: 30 TABLET | Refills: 5 | Status: SHIPPED | OUTPATIENT
Start: 2024-09-20

## 2024-09-20 RX ORDER — CARBAMAZEPINE 200 MG/1
TABLET ORAL
Qty: 90 TABLET | Refills: 5 | Status: SHIPPED | OUTPATIENT
Start: 2024-09-20

## 2024-09-20 RX ORDER — ATORVASTATIN CALCIUM 20 MG/1
TABLET, FILM COATED ORAL
Qty: 30 TABLET | Refills: 5 | Status: SHIPPED | OUTPATIENT
Start: 2024-09-20 | End: 2025-03-20

## 2024-09-25 ENCOUNTER — HOSPITAL ENCOUNTER (OUTPATIENT)
Facility: MEDICAL CENTER | Age: 54
Discharge: HOME | End: 2024-09-25
Payer: MEDICARE

## 2024-09-25 ENCOUNTER — HOSPITAL ENCOUNTER (OUTPATIENT)
Dept: INFUSION THERAPY | Facility: MEDICAL CENTER | Age: 54
Discharge: HOME | End: 2024-09-25
Payer: MEDICARE

## 2024-09-25 ENCOUNTER — TELEPHONE (OUTPATIENT)
Dept: FAMILY MEDICINE CLINIC | Age: 54
End: 2024-09-25

## 2024-09-25 VITALS
HEART RATE: 75 BPM | TEMPERATURE: 99.3 F | RESPIRATION RATE: 16 BRPM | SYSTOLIC BLOOD PRESSURE: 112 MMHG | DIASTOLIC BLOOD PRESSURE: 78 MMHG

## 2024-09-25 DIAGNOSIS — M48.56XG: Primary | ICD-10-CM

## 2024-09-25 LAB
ANION GAP SERPL CALCULATED.3IONS-SCNC: 9 MMOL/L (ref 9–17)
BUN SERPL-MCNC: 10 MG/DL (ref 6–20)
BUN/CREAT SERPL: 17 (ref 9–20)
CALCIUM SERPL-MCNC: 9.1 MG/DL (ref 8.6–10.4)
CHLORIDE SERPL-SCNC: 101 MMOL/L (ref 98–107)
CO2 SERPL-SCNC: 26 MMOL/L (ref 20–31)
CREAT SERPL-MCNC: 0.6 MG/DL (ref 0.5–0.9)
GFR, ESTIMATED: >90 ML/MIN/1.73M2
GLUCOSE SERPL-MCNC: 111 MG/DL (ref 70–99)
POTASSIUM SERPL-SCNC: 5.2 MMOL/L (ref 3.7–5.3)
SODIUM SERPL-SCNC: 136 MMOL/L (ref 135–144)

## 2024-09-25 PROCEDURE — 6360000002 HC RX W HCPCS: Performed by: INTERNAL MEDICINE

## 2024-09-25 PROCEDURE — 96372 THER/PROPH/DIAG INJ SC/IM: CPT

## 2024-09-25 PROCEDURE — 36415 COLL VENOUS BLD VENIPUNCTURE: CPT

## 2024-09-25 PROCEDURE — 80048 BASIC METABOLIC PNL TOTAL CA: CPT

## 2024-09-25 RX ADMIN — DENOSUMAB 60 MG: 60 INJECTION SUBCUTANEOUS at 10:11

## 2024-09-25 ASSESSMENT — PAIN SCALES - GENERAL: PAINLEVEL_OUTOF10: 2

## 2024-09-25 NOTE — PROGRESS NOTES
Pt here for Prolia  Arrives ambulatory  Labs reviewed (within treatment parameters)  Denies complaints/concerns  Pt has a broken tooth, no sign of infection, no plans to pull tooth soon. Hasn't been to the dentist in a few years.  RN notified the office of Ordering provider, per Dr Alfa rinaldi to give Prolia.  Pt aware of Side effects of mediation.  Tx complete without incident  Pt discharged in stable condition  Returns 3/25 for Prolia.

## 2024-10-14 ENCOUNTER — TELEPHONE (OUTPATIENT)
Dept: PULMONOLOGY | Age: 54
End: 2024-10-14

## 2024-10-14 DIAGNOSIS — R91.1 LUNG NODULE: Primary | ICD-10-CM

## 2024-10-14 NOTE — TELEPHONE ENCOUNTER
Patient has a lung nodule, last CT done in June. Patient of Dr Dueñas.  She is wishing to follow up with you, appt was made for December. I placed an order, based on radiology recommendation patient needs a CT in December. Please sign the order, I'll inform pt to get done prior to your appt.

## 2024-10-16 ENCOUNTER — OFFICE VISIT (OUTPATIENT)
Dept: FAMILY MEDICINE CLINIC | Age: 54
End: 2024-10-16
Payer: MEDICARE

## 2024-10-16 VITALS
OXYGEN SATURATION: 98 % | WEIGHT: 135 LBS | SYSTOLIC BLOOD PRESSURE: 100 MMHG | DIASTOLIC BLOOD PRESSURE: 60 MMHG | BODY MASS INDEX: 22.47 KG/M2 | HEART RATE: 94 BPM

## 2024-10-16 DIAGNOSIS — C71.9 ASTROCYTOMA (HCC): ICD-10-CM

## 2024-10-16 DIAGNOSIS — F33.2 MAJOR DEPRESSIVE DISORDER, RECURRENT SEVERE WITHOUT PSYCHOTIC FEATURES (HCC): ICD-10-CM

## 2024-10-16 DIAGNOSIS — E55.9 VITAMIN D DEFICIENCY: ICD-10-CM

## 2024-10-16 DIAGNOSIS — J96.12 CHRONIC RESPIRATORY FAILURE WITH HYPOXIA AND HYPERCAPNIA: ICD-10-CM

## 2024-10-16 DIAGNOSIS — Z23 NEED FOR INFLUENZA VACCINATION: ICD-10-CM

## 2024-10-16 DIAGNOSIS — Z13.220 SCREENING, LIPID: ICD-10-CM

## 2024-10-16 DIAGNOSIS — I10 ESSENTIAL HYPERTENSION: Primary | ICD-10-CM

## 2024-10-16 DIAGNOSIS — J44.1 COPD EXACERBATION (HCC): ICD-10-CM

## 2024-10-16 DIAGNOSIS — E87.1 HYPONATREMIA: ICD-10-CM

## 2024-10-16 DIAGNOSIS — B96.89 ACUTE BACTERIAL SINUSITIS: ICD-10-CM

## 2024-10-16 DIAGNOSIS — Z13.29 SCREENING FOR THYROID DISORDER: ICD-10-CM

## 2024-10-16 DIAGNOSIS — J01.90 ACUTE BACTERIAL SINUSITIS: ICD-10-CM

## 2024-10-16 DIAGNOSIS — D53.9 MACROCYTIC ANEMIA: ICD-10-CM

## 2024-10-16 DIAGNOSIS — J96.11 CHRONIC RESPIRATORY FAILURE WITH HYPOXIA AND HYPERCAPNIA: ICD-10-CM

## 2024-10-16 PROCEDURE — 3017F COLORECTAL CA SCREEN DOC REV: CPT | Performed by: INTERNAL MEDICINE

## 2024-10-16 PROCEDURE — 90661 CCIIV3 VAC ABX FR 0.5 ML IM: CPT | Performed by: INTERNAL MEDICINE

## 2024-10-16 PROCEDURE — G8427 DOCREV CUR MEDS BY ELIG CLIN: HCPCS | Performed by: INTERNAL MEDICINE

## 2024-10-16 PROCEDURE — G8420 CALC BMI NORM PARAMETERS: HCPCS | Performed by: INTERNAL MEDICINE

## 2024-10-16 PROCEDURE — G8484 FLU IMMUNIZE NO ADMIN: HCPCS | Performed by: INTERNAL MEDICINE

## 2024-10-16 PROCEDURE — 3078F DIAST BP <80 MM HG: CPT | Performed by: INTERNAL MEDICINE

## 2024-10-16 PROCEDURE — 3023F SPIROM DOC REV: CPT | Performed by: INTERNAL MEDICINE

## 2024-10-16 PROCEDURE — 4004F PT TOBACCO SCREEN RCVD TLK: CPT | Performed by: INTERNAL MEDICINE

## 2024-10-16 PROCEDURE — 3074F SYST BP LT 130 MM HG: CPT | Performed by: INTERNAL MEDICINE

## 2024-10-16 PROCEDURE — G0008 ADMIN INFLUENZA VIRUS VAC: HCPCS | Performed by: INTERNAL MEDICINE

## 2024-10-16 PROCEDURE — 99214 OFFICE O/P EST MOD 30 MIN: CPT | Performed by: INTERNAL MEDICINE

## 2024-10-16 RX ORDER — PREDNISONE 10 MG/1
TABLET ORAL
Qty: 30 TABLET | Refills: 0 | Status: SHIPPED | OUTPATIENT
Start: 2024-10-16

## 2024-10-16 RX ORDER — AMLODIPINE BESYLATE 10 MG/1
10 TABLET ORAL DAILY
Qty: 90 TABLET | Refills: 1 | Status: SHIPPED | OUTPATIENT
Start: 2024-10-16

## 2024-10-16 RX ORDER — ERGOCALCIFEROL 1.25 MG/1
50000 CAPSULE, LIQUID FILLED ORAL WEEKLY
Qty: 12 CAPSULE | Refills: 1 | Status: SHIPPED | OUTPATIENT
Start: 2024-10-16

## 2024-10-16 NOTE — PROGRESS NOTES
[facility]  [facility]  [address] [address]  Dept: [phone]  Dept Fax: [phone]      Barbara White is a 54 y.o. female who presents today for hermedical conditions/complaints as noted below.  Barbara White is c/o of Hypertension (4 month follow up) and Cough (States she has had a cough and shortness of breath for the past couple weeks/States she did have a sore throat/Denied fevers, chills, body aches)        Assessment/Plan:     1. Essential hypertension  -     amLODIPine (NORVASC) 10 MG tablet; Take 1 tablet by mouth daily, Disp-90 tablet, R-1Normal  -     Comprehensive Metabolic Panel; Future  2. Vitamin D deficiency  -     vitamin D (ERGOCALCIFEROL) 1.25 MG (91353 UT) CAPS capsule; Take 1 capsule by mouth once a week, Disp-12 capsule, R-1Normal  3. Need for influenza vaccination  -     Influenza, FLUCELVAX Trivalent, (age 6 mo+) IM, Preservative Free, 0.5mL  4. COPD exacerbation (HCC)  -     amoxicillin-clavulanate (AUGMENTIN) 875-125 MG per tablet; Take 1 tablet by mouth 2 times daily for 7 days, Disp-14 tablet, R-0Normal  -     predniSONE (DELTASONE) 10 MG tablet; 4 tabs by mouth daily for 3 days, then 3 tabs daily for 3 days, then 2 tabs daily for 3 days, then 1 tab daily till gone., Disp-30 tablet, R-0Normal  5. Astrocytoma (HCC)  6. Chronic respiratory failure with hypoxia and hypercapnia  7. Acute bacterial sinusitis  -     amoxicillin-clavulanate (AUGMENTIN) 875-125 MG per tablet; Take 1 tablet by mouth 2 times daily for 7 days, Disp-14 tablet, R-0Normal  8. Macrocytic anemia  -     CBC with Auto Differential; Future  9. Major depressive disorder, recurrent severe without psychotic features (HCC)  10. Hyponatremia  -     Comprehensive Metabolic Panel; Future  11. Screening for thyroid disorder  -     TSH With Reflex Ft4; Future  12. Screening, lipid  -     Comprehensive Metabolic Panel; Future  -     Lipid, Fasting; Future          No

## 2024-10-17 DIAGNOSIS — J42 CHRONIC BRONCHITIS, UNSPECIFIED CHRONIC BRONCHITIS TYPE (HCC): ICD-10-CM

## 2024-10-17 RX ORDER — FLUTICASONE FUROATE, UMECLIDINIUM BROMIDE AND VILANTEROL TRIFENATATE 100; 62.5; 25 UG/1; UG/1; UG/1
POWDER RESPIRATORY (INHALATION)
Qty: 1 EACH | Refills: 5 | Status: SHIPPED | OUTPATIENT
Start: 2024-10-17

## 2024-10-17 RX ORDER — ALBUTEROL SULFATE 90 UG/1
2 AEROSOL, METERED RESPIRATORY (INHALATION) 4 TIMES DAILY PRN
Qty: 3 EACH | Refills: 1 | Status: SHIPPED | OUTPATIENT
Start: 2024-10-17

## 2024-10-17 NOTE — TELEPHONE ENCOUNTER
Last office visit: 10/16/24    Next office visit: 4/16/25        Barbara White is calling to request a refill on the following medication(s):    Last Visit Date (If Applicable):  10/16/2024    Next Visit Date:    4/16/2025    Medication Request:  Requested Prescriptions     Pending Prescriptions Disp Refills    VENTOLIN  (90 Base) MCG/ACT inhaler [Pharmacy Med Name: VENTOLIN HFA INHALER 18GM]  0     Sig: Inhale 2 puffs into the lungs 4 times daily as needed for wheezing

## 2024-10-17 NOTE — TELEPHONE ENCOUNTER
Last visit: 2/6/24-Dr. Dueñas  Next visit: 12/9/24- Dr. Sesay    Refill request for Trelegy. Per last dictation, patient continues taking Trelegy. Please see pended script and advise.

## 2024-10-22 ASSESSMENT — ENCOUNTER SYMPTOMS
NAUSEA: 0
CONSTIPATION: 0
BLOOD IN STOOL: 0
VOMITING: 0
CHEST TIGHTNESS: 0
ABDOMINAL PAIN: 0
CHOKING: 0
ANAL BLEEDING: 0
DIARRHEA: 0

## 2024-10-22 ASSESSMENT — VISUAL ACUITY: OU: 1

## 2024-10-25 DIAGNOSIS — I73.9 CLAUDICATION (HCC): Primary | ICD-10-CM

## 2024-10-28 ENCOUNTER — HOSPITAL ENCOUNTER (OUTPATIENT)
Dept: VASCULAR LAB | Age: 54
Discharge: HOME | End: 2024-10-30
Attending: SURGERY
Payer: MEDICARE

## 2024-10-28 DIAGNOSIS — I73.9 CLAUDICATION (HCC): ICD-10-CM

## 2024-10-28 LAB
VAS LEFT ABI: 0.68
VAS LEFT ARM BP: 119 MMHG
VAS LEFT DORSALIS PEDIS BP: 78 MMHG
VAS LEFT PTA BP: 81 MMHG
VAS LEFT TBI: 0.45
VAS LEFT TOE PRESSURE: 54 MMHG
VAS RIGHT ABI: 1.03
VAS RIGHT ARM BP: 114 MMHG
VAS RIGHT DORSALIS PEDIS BP: 117 MMHG
VAS RIGHT PTA BP: 123 MMHG
VAS RIGHT TBI: 0.77
VAS RIGHT TOE PRESSURE: 92 MMHG

## 2024-10-28 PROCEDURE — 93923 UPR/LXTR ART STDY 3+ LVLS: CPT | Performed by: STUDENT IN AN ORGANIZED HEALTH CARE EDUCATION/TRAINING PROGRAM

## 2024-10-28 PROCEDURE — 93923 UPR/LXTR ART STDY 3+ LVLS: CPT

## 2024-11-01 ENCOUNTER — OFFICE VISIT (OUTPATIENT)
Dept: VASCULAR SURGERY | Age: 54
End: 2024-11-01
Payer: MEDICARE

## 2024-11-01 ENCOUNTER — HOSPITAL ENCOUNTER (OUTPATIENT)
Age: 54
Discharge: HOME | End: 2024-11-01
Payer: MEDICARE

## 2024-11-01 VITALS
RESPIRATION RATE: 16 BRPM | HEART RATE: 80 BPM | HEIGHT: 65 IN | DIASTOLIC BLOOD PRESSURE: 93 MMHG | BODY MASS INDEX: 22.82 KG/M2 | WEIGHT: 137 LBS | SYSTOLIC BLOOD PRESSURE: 143 MMHG | OXYGEN SATURATION: 97 %

## 2024-11-01 DIAGNOSIS — Z13.29 SCREENING FOR THYROID DISORDER: ICD-10-CM

## 2024-11-01 DIAGNOSIS — E87.1 HYPONATREMIA: ICD-10-CM

## 2024-11-01 DIAGNOSIS — D53.9 MACROCYTIC ANEMIA: ICD-10-CM

## 2024-11-01 DIAGNOSIS — Z13.220 SCREENING, LIPID: ICD-10-CM

## 2024-11-01 DIAGNOSIS — I10 ESSENTIAL HYPERTENSION: ICD-10-CM

## 2024-11-01 DIAGNOSIS — I70.212 ATHEROSCLER OF NATIVE ARTERY OF LEFT LEG WITH INTERMIT CLAUDICATION (HCC): Primary | ICD-10-CM

## 2024-11-01 LAB
ALBUMIN SERPL-MCNC: 4 G/DL (ref 3.5–5.2)
ALBUMIN/GLOB SERPL: 1.3 {RATIO} (ref 1–2.5)
ALP SERPL-CCNC: 145 U/L (ref 35–104)
ALT SERPL-CCNC: 6 U/L (ref 10–35)
ANION GAP SERPL CALCULATED.3IONS-SCNC: 9 MMOL/L (ref 9–16)
AST SERPL-CCNC: 17 U/L (ref 10–35)
BASOPHILS # BLD: 0.03 K/UL (ref 0–0.2)
BASOPHILS NFR BLD: 0 % (ref 0–2)
BILIRUB SERPL-MCNC: <0.2 MG/DL (ref 0–1.2)
BUN SERPL-MCNC: 6 MG/DL (ref 6–20)
CALCIUM SERPL-MCNC: 8.6 MG/DL (ref 8.6–10.4)
CHLORIDE SERPL-SCNC: 103 MMOL/L (ref 98–107)
CHOLEST SERPL-MCNC: 218 MG/DL (ref 0–199)
CHOLESTEROL/HDL RATIO: 4.7
CO2 SERPL-SCNC: 21 MMOL/L (ref 20–31)
CREAT SERPL-MCNC: 0.5 MG/DL (ref 0.6–0.9)
EOSINOPHIL # BLD: <0.03 K/UL (ref 0–0.44)
EOSINOPHILS RELATIVE PERCENT: 0 % (ref 1–4)
ERYTHROCYTE [DISTWIDTH] IN BLOOD BY AUTOMATED COUNT: 14.7 % (ref 11.8–14.4)
GFR, ESTIMATED: >90 ML/MIN/1.73M2
GLUCOSE SERPL-MCNC: 104 MG/DL (ref 74–99)
HCT VFR BLD AUTO: 39.3 % (ref 36.3–47.1)
HDLC SERPL-MCNC: 46 MG/DL
HGB BLD-MCNC: 12.9 G/DL (ref 11.9–15.1)
IMM GRANULOCYTES # BLD AUTO: 0.05 K/UL (ref 0–0.3)
IMM GRANULOCYTES NFR BLD: 0 %
LDLC SERPL CALC-MCNC: 156 MG/DL (ref 0–100)
LYMPHOCYTES NFR BLD: 3.45 K/UL (ref 1.1–3.7)
LYMPHOCYTES RELATIVE PERCENT: 31 % (ref 24–43)
MCH RBC QN AUTO: 28.9 PG (ref 25.2–33.5)
MCHC RBC AUTO-ENTMCNC: 32.8 G/DL (ref 28.4–34.8)
MCV RBC AUTO: 88.1 FL (ref 82.6–102.9)
MONOCYTES NFR BLD: 0.65 K/UL (ref 0.1–1.2)
MONOCYTES NFR BLD: 6 % (ref 3–12)
NEUTROPHILS NFR BLD: 63 % (ref 36–65)
NEUTS SEG NFR BLD: 7.03 K/UL (ref 1.5–8.1)
NRBC BLD-RTO: 0 PER 100 WBC
PLATELET # BLD AUTO: 442 K/UL (ref 138–453)
PMV BLD AUTO: 9.4 FL (ref 8.1–13.5)
POTASSIUM SERPL-SCNC: 4.2 MMOL/L (ref 3.7–5.3)
PROT SERPL-MCNC: 7.2 G/DL (ref 6.6–8.7)
RBC # BLD AUTO: 4.46 M/UL (ref 3.95–5.11)
RBC # BLD: ABNORMAL 10*6/UL
SODIUM SERPL-SCNC: 133 MMOL/L (ref 136–145)
TRIGL SERPL-MCNC: 81 MG/DL (ref 0–149)
TSH SERPL DL<=0.05 MIU/L-ACNC: 1.23 UIU/ML (ref 0.27–4.2)
VLDLC SERPL CALC-MCNC: 16 MG/DL (ref 1–30)
WBC OTHER # BLD: 11.2 K/UL (ref 3.5–11.3)

## 2024-11-01 PROCEDURE — 85025 COMPLETE CBC W/AUTO DIFF WBC: CPT

## 2024-11-01 PROCEDURE — 99213 OFFICE O/P EST LOW 20 MIN: CPT | Performed by: SURGERY

## 2024-11-01 PROCEDURE — G8427 DOCREV CUR MEDS BY ELIG CLIN: HCPCS | Performed by: SURGERY

## 2024-11-01 PROCEDURE — 80053 COMPREHEN METABOLIC PANEL: CPT

## 2024-11-01 PROCEDURE — 84443 ASSAY THYROID STIM HORMONE: CPT

## 2024-11-01 PROCEDURE — 4004F PT TOBACCO SCREEN RCVD TLK: CPT | Performed by: SURGERY

## 2024-11-01 PROCEDURE — 3080F DIAST BP >= 90 MM HG: CPT | Performed by: SURGERY

## 2024-11-01 PROCEDURE — 3077F SYST BP >= 140 MM HG: CPT | Performed by: SURGERY

## 2024-11-01 PROCEDURE — 3017F COLORECTAL CA SCREEN DOC REV: CPT | Performed by: SURGERY

## 2024-11-01 PROCEDURE — 36415 COLL VENOUS BLD VENIPUNCTURE: CPT

## 2024-11-01 PROCEDURE — G8420 CALC BMI NORM PARAMETERS: HCPCS | Performed by: SURGERY

## 2024-11-01 PROCEDURE — 80061 LIPID PANEL: CPT

## 2024-11-01 PROCEDURE — G8484 FLU IMMUNIZE NO ADMIN: HCPCS | Performed by: SURGERY

## 2024-11-01 NOTE — PROGRESS NOTES
[facility]  [address]  [place] [address]  Dept: [phone]     Patient: Barbara White  : 1970  MRN: [id number]  DOS: 2024            HPI:  Barbara White is a 54 y.o. female who returns to the office regarding left lower extremity claudication.  She had repeat ESE and PVR examination revealing SFA disease with an ESE that has improved to approximately 0.68.  The last ESE roughly 6 months ago was 0.5.  She continues to smoke about 6 to 8 cigarettes/day.  She is walking a much longer distance at approximately 1 mile without having to stop.  She has developed her collaterals well.  She denies rest pain.    Review of Systems    Vitals:    24 1027   BP: (!) 143/93   Site: Left Upper Arm   Position: Sitting   Cuff Size: Medium Adult   Pulse: 80   Resp: 16   SpO2: 97%   Weight: 62.1 kg (137 lb)   Height: 1.651 m (5' 5\")          Physical Exam  On examination she has a palpable dorsalis pedis pulse on the right and posterior tibial pulse on the right.  The left has no dorsalis pedis or posterior tibial pulses.  She has palpable femoral pulses bilaterally which are strong and equal.  Her feet are warm and well-perfused without ulceration or gangrene.  She has no significant dependent rubor.  She has no edema.    Assessment:  1. 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  I reiterated the benefits of smoking cessation and she understands the limb salvage issues that are revolving around continued smoking.  We will see her in 6 months with the appropriate studies.    Electronically signed by:  Germán Christine MD

## 2024-11-13 DIAGNOSIS — N39.41 URGE INCONTINENCE OF URINE: ICD-10-CM

## 2024-11-13 DIAGNOSIS — E87.1 HYPONATREMIA: ICD-10-CM

## 2024-11-13 DIAGNOSIS — I10 ESSENTIAL HYPERTENSION: ICD-10-CM

## 2024-11-13 DIAGNOSIS — E87.6 HYPOKALEMIA: ICD-10-CM

## 2024-11-13 RX ORDER — SODIUM CHLORIDE 1 G/1
TABLET ORAL
Qty: 84 TABLET | Refills: 0 | Status: SHIPPED | OUTPATIENT
Start: 2024-11-13

## 2024-11-13 NOTE — TELEPHONE ENCOUNTER
Last visit: 10/16/2024  Last Med refill: 05/21/224  Does patient have enough medication for 72 hours: No    Next Visit Date:  Future Appointments   Date Time Provider Department Center   12/9/2024  9:00 AM Chapincito Sesay MD Resp Spec [facility]   3/25/2025  9:30 AM STV STA CHAIR 09 STVZ STA MED [place]   4/16/2025 11:00 AM Ludivina Grimm MD [facility] ECC DEP   5/2/2025  8:30 AM [facility]       Health Maintenance   Topic Date Due    Hepatitis B vaccine (1 of 3 - 19+ 3-dose series) Never done    COVID-19 Vaccine (5 - 2023-24 season) 09/01/2024    Depression Monitoring  01/24/2025    Lung Cancer Screening &/or Counseling  06/04/2025    Breast cancer screen  08/14/2025    Lipids  11/01/2025    DTaP/Tdap/Td vaccine (2 - Td or Tdap) 12/28/2030    Colorectal Cancer Screen  03/14/2034    Flu vaccine  Completed    Shingles vaccine  Completed    Pneumococcal 0-64 years Vaccine  Completed    Hepatitis C screen  Completed    HIV screen  Completed    Hepatitis A vaccine  Aged Out    Hib vaccine  Aged Out    Polio vaccine  Aged Out    Meningococcal (ACWY) vaccine  Aged Out       Hemoglobin A1C (%)   Date Value   04/13/2021 5.5   07/14/2019 4.3             ( goal A1C is < 7)   No components found for: \"LABMICR\"  No components found for: \"LDLCHOLESTEROL\", \"LDLCALC\"    (goal LDL is <100)   AST (U/L)   Date Value   11/01/2024 17     ALT (U/L)   Date Value   11/01/2024 6 (L)     BUN (mg/dL)   Date Value   11/01/2024 6     BP Readings from Last 3 Encounters:   11/01/24 (!) 143/93   10/16/24 100/60   09/25/24 112/78          (goal 120/80)    All Future Testing planned in [facility]  Lab Frequency Next Occurrence   CT CHEST LOW DOSE (LDCT) Once 07/23/2024   EGD Routine Once 03/05/2024   COLONOSCOPY (Diagnostic) Once 03/05/2024   DEXA BONE DENSITY 2 SITES Once 06/04/2024   Low Dose Chest CT--pulmonologist/radiologist use only Once 12/02/2024   Vascular lower arterial complete physiologic Doppler

## 2024-12-09 ENCOUNTER — OFFICE VISIT (OUTPATIENT)
Dept: PULMONOLOGY | Age: 54
End: 2024-12-09
Payer: MEDICARE

## 2024-12-09 VITALS
HEART RATE: 100 BPM | SYSTOLIC BLOOD PRESSURE: 101 MMHG | BODY MASS INDEX: 23.16 KG/M2 | RESPIRATION RATE: 18 BRPM | WEIGHT: 139 LBS | HEIGHT: 65 IN | OXYGEN SATURATION: 98 % | DIASTOLIC BLOOD PRESSURE: 74 MMHG

## 2024-12-09 DIAGNOSIS — J44.9 COPD, MODERATE (HCC): ICD-10-CM

## 2024-12-09 DIAGNOSIS — J96.11 CHRONIC RESPIRATORY FAILURE WITH HYPOXIA: ICD-10-CM

## 2024-12-09 DIAGNOSIS — Z87.891 PERSONAL HISTORY OF TOBACCO USE, PRESENTING HAZARDS TO HEALTH: ICD-10-CM

## 2024-12-09 DIAGNOSIS — R91.1 NODULE OF UPPER LOBE OF RIGHT LUNG: Primary | ICD-10-CM

## 2024-12-09 DIAGNOSIS — J43.9 MILD EMPHYSEMA (HCC): ICD-10-CM

## 2024-12-09 PROCEDURE — 99406 BEHAV CHNG SMOKING 3-10 MIN: CPT | Performed by: INTERNAL MEDICINE

## 2024-12-09 PROCEDURE — 3074F SYST BP LT 130 MM HG: CPT | Performed by: INTERNAL MEDICINE

## 2024-12-09 PROCEDURE — 4004F PT TOBACCO SCREEN RCVD TLK: CPT | Performed by: INTERNAL MEDICINE

## 2024-12-09 PROCEDURE — 3023F SPIROM DOC REV: CPT | Performed by: INTERNAL MEDICINE

## 2024-12-09 PROCEDURE — 3017F COLORECTAL CA SCREEN DOC REV: CPT | Performed by: INTERNAL MEDICINE

## 2024-12-09 PROCEDURE — G8427 DOCREV CUR MEDS BY ELIG CLIN: HCPCS | Performed by: INTERNAL MEDICINE

## 2024-12-09 PROCEDURE — 3078F DIAST BP <80 MM HG: CPT | Performed by: INTERNAL MEDICINE

## 2024-12-09 PROCEDURE — G8420 CALC BMI NORM PARAMETERS: HCPCS | Performed by: INTERNAL MEDICINE

## 2024-12-09 PROCEDURE — G8484 FLU IMMUNIZE NO ADMIN: HCPCS | Performed by: INTERNAL MEDICINE

## 2024-12-09 PROCEDURE — 99214 OFFICE O/P EST MOD 30 MIN: CPT | Performed by: INTERNAL MEDICINE

## 2024-12-09 RX ORDER — AZITHROMYCIN 250 MG/1
TABLET, FILM COATED ORAL
Qty: 6 TABLET | Refills: 0 | Status: SHIPPED | OUTPATIENT
Start: 2024-12-09

## 2024-12-09 RX ORDER — MIRABEGRON 50 MG/1
TABLET, FILM COATED, EXTENDED RELEASE ORAL
COMMUNITY
Start: 2024-11-20

## 2024-12-09 RX ORDER — PREDNISONE 10 MG/1
40 TABLET ORAL DAILY
Qty: 20 TABLET | Refills: 0 | Status: SHIPPED | OUTPATIENT
Start: 2024-12-09 | End: 2024-12-14

## 2024-12-09 NOTE — PROGRESS NOTES
Barbara White  12/9/2024      Barbara White  presented for follow up for COPD.  She is using the following inhalers trelegy and albuterol .  Barbara has been doing well with no significant change in pulmonary status.  Dyspnea is stable.  Since the last visit she denies any new medical issues             Review of Systems -  General ROS: negative for - chills, fatigue, fever or weight loss  ENT ROS: negative for - headaches, oral lesions or sore throat  Cardiovascular ROS: no chest pain , orthopnea or pnd   Gastrointestinal ROS: no abdominal pain, change in bowel habits, or black or bloody stools  Skin - no rash   Neuro - no blurry vision , no loc . No focal weakness   msk - no jt tenderness or swelling    Vascular - no claudication , rest completed and negative   Lymphatic - complete and negative   Hematology - oncology - complete and negative   Allergy immunology - complete and negative    no burning or hematuria       LUNG CANCER SCREENING     CRITERIA MET    []     CT ORDERED  []      CRITERIA NOT MET   []      REFUSED                    []        REASON CRITERIA NOT MET     SMOKING LESS THAN 30 PY  []      AGE LESS THAN 50 or GREATER 80 YEARS  []      QUIT SMOKING 15 YEARS OR GREATER   []      RECENT CT WITH IN 11 MONTHS    []      LIFE EXPECTANCY < 5 YEARS   []      SIGNS  AND SYMPTOMS OF LUNG CANCER   []           Immunization   Immunization History   Administered Date(s) Administered    COVID-19, PFIZER GRAY top, DO NOT Dilute, (age 12 y+), IM, 30 mcg/0.3 mL 06/09/2022    COVID-19, PFIZER PURPLE top, DILUTE for use, (age 12 y+), 30mcg/0.3mL 03/26/2021, 04/23/2021, 11/04/2021    Influenza Vaccine, unspecified formulation 10/18/2018    Influenza Virus Vaccine 11/01/2017, 10/18/2018, 09/10/2019, 10/01/2020    Influenza, AFLURIA (age 3 y+), FLUZONE, (age 6 mo+), Quadv MDV, 0.5mL 09/10/2019    Influenza, FLUBLOK, (age 18 y+), Quadv PF, 0.5mL 09/22/2023    Influenza, FLUCELVAX, (age 6 mo+) IM, Trivalent

## 2024-12-11 DIAGNOSIS — E87.1 HYPONATREMIA: ICD-10-CM

## 2024-12-11 DIAGNOSIS — I10 ESSENTIAL HYPERTENSION: ICD-10-CM

## 2024-12-11 DIAGNOSIS — N39.41 URGE INCONTINENCE OF URINE: ICD-10-CM

## 2024-12-11 DIAGNOSIS — E87.6 HYPOKALEMIA: ICD-10-CM

## 2024-12-11 NOTE — TELEPHONE ENCOUNTER
Last visit: 10/16/24  Last Med refill: 11/13/24  Does patient have enough medication for 72 hours: No:     Next Visit Date:  Future Appointments   Date Time Provider Department Center   12/12/2024  8:30 AM STV CT ROOM 1 [facility] CT SCAN [facility] Radiolog   3/25/2025  9:30 AM STV STA CHAIR 09 STVZ STA MED [place]   4/16/2025 11:00 AM Ludivina Grimm MD [facility] ECC DEP   5/2/2025  8:30 AM Germán Christine MD heartvasc [facility]   6/12/2025  9:20 AM Donato Dueñas MD Resp Spec [facility]       Health Maintenance   Topic Date Due    Hepatitis B vaccine (1 of 3 - 19+ 3-dose series) Never done    Annual Wellness Visit (Medicare Advantage)  Never done    COVID-19 Vaccine (5 - 2023-24 season) 09/01/2024    Depression Monitoring  01/24/2025    Lung Cancer Screening &/or Counseling  06/04/2025    Breast cancer screen  08/14/2025    Lipids  11/01/2025    DTaP/Tdap/Td vaccine (2 - Td or Tdap) 12/28/2030    Colorectal Cancer Screen  03/14/2034    Flu vaccine  Completed    Shingles vaccine  Completed    Pneumococcal 0-64 years Vaccine  Completed    Hepatitis C screen  Completed    HIV screen  Completed    Hepatitis A vaccine  Aged Out    Hib vaccine  Aged Out    Polio vaccine  Aged Out    Meningococcal (ACWY) vaccine  Aged Out       Hemoglobin A1C (%)   Date Value   04/13/2021 5.5   07/14/2019 4.3             ( goal A1C is < 7)   No components found for: \"LABMICR\"  No components found for: \"LDLCHOLESTEROL\", \"LDLCALC\"    (goal LDL is <100)   AST (U/L)   Date Value   11/01/2024 17     ALT (U/L)   Date Value   11/01/2024 6 (L)     BUN (mg/dL)   Date Value   11/01/2024 6     BP Readings from Last 3 Encounters:   12/09/24 101/74   11/01/24 (!) 143/93   10/16/24 100/60          (goal 120/80)    All Future Testing planned in [facility]  Lab Frequency Next Occurrence   EGD Routine Once 03/05/2024   COLONOSCOPY (Diagnostic) Once 03/05/2024   DEXA BONE DENSITY 2 SITES Once 06/04/2024   Vascular lower arterial complete physiologic

## 2024-12-12 ENCOUNTER — HOSPITAL ENCOUNTER (OUTPATIENT)
Dept: CT IMAGING | Age: 54
Discharge: HOME | End: 2024-12-14
Attending: INTERNAL MEDICINE
Payer: MEDICARE

## 2024-12-12 DIAGNOSIS — R91.1 NODULE OF UPPER LOBE OF RIGHT LUNG: ICD-10-CM

## 2024-12-12 PROCEDURE — 71250 CT THORAX DX C-: CPT

## 2024-12-12 RX ORDER — SODIUM CHLORIDE 1 G/1
TABLET ORAL
Qty: 84 TABLET | Refills: 0 | Status: SHIPPED | OUTPATIENT
Start: 2024-12-12

## 2025-01-07 DIAGNOSIS — N39.41 URGE INCONTINENCE OF URINE: ICD-10-CM

## 2025-01-07 DIAGNOSIS — E87.6 HYPOKALEMIA: ICD-10-CM

## 2025-01-07 DIAGNOSIS — E87.1 HYPONATREMIA: ICD-10-CM

## 2025-01-07 DIAGNOSIS — I10 ESSENTIAL HYPERTENSION: ICD-10-CM

## 2025-01-07 RX ORDER — ROPINIROLE 1 MG/1
1 TABLET, FILM COATED ORAL NIGHTLY
Qty: 90 TABLET | Refills: 1 | Status: SHIPPED | OUTPATIENT
Start: 2025-01-07

## 2025-01-07 RX ORDER — SODIUM CHLORIDE 1 G/1
TABLET ORAL
Qty: 90 TABLET | Refills: 1 | Status: SHIPPED | OUTPATIENT
Start: 2025-01-07

## 2025-01-07 NOTE — TELEPHONE ENCOUNTER
Last visit: 10/16/24  Last Med refill: 12/17/24  Does patient have enough medication for 72 hours: Yes    Next Visit Date:  Future Appointments   Date Time Provider Department Center   3/25/2025  9:30 AM STV STA CHAIR 09 STVZ STA MED [place]   4/16/2025 11:00 AM Ludivina Grimm MD [facility] ECC DEP   5/2/2025  8:30 AM Germán Christine MD heartvasc [facility]   6/12/2025  9:20 AM Donato Dueñas MD Resp Spec [facility]       Health Maintenance   Topic Date Due    Hepatitis B vaccine (1 of 3 - 19+ 3-dose series) Never done    COVID-19 Vaccine (5 - 2023-24 season) 09/01/2024    Annual Wellness Visit (Medicare Advantage)  Never done    Depression Monitoring  01/24/2025    Breast cancer screen  08/14/2025    Lipids  11/01/2025    Lung Cancer Screening &/or Counseling  12/12/2025    DTaP/Tdap/Td vaccine (2 - Td or Tdap) 12/28/2030    Colorectal Cancer Screen  03/14/2034    Flu vaccine  Completed    Shingles vaccine  Completed    Pneumococcal 0-64 years Vaccine  Completed    Hepatitis C screen  Completed    HIV screen  Completed    Hepatitis A vaccine  Aged Out    Hib vaccine  Aged Out    Polio vaccine  Aged Out    Meningococcal (ACWY) vaccine  Aged Out       Hemoglobin A1C (%)   Date Value   04/13/2021 5.5   07/14/2019 4.3             ( goal A1C is < 7)   No components found for: \"LABMICR\"  No components found for: \"LDLCHOLESTEROL\", \"LDLCALC\"    (goal LDL is <100)   AST (U/L)   Date Value   11/01/2024 17     ALT (U/L)   Date Value   11/01/2024 6 (L)     BUN (mg/dL)   Date Value   11/01/2024 6     BP Readings from Last 3 Encounters:   12/09/24 101/74   11/01/24 (!) 143/93   10/16/24 100/60          (goal 120/80)    All Future Testing planned in [facility]  Lab Frequency Next Occurrence   EGD Routine Once 03/05/2024   COLONOSCOPY (Diagnostic) Once 03/05/2024   DEXA BONE DENSITY 2 SITES Once 06/04/2024   Vascular lower arterial complete physiologic Doppler at rest Once 05/01/2025               Patient Active

## 2025-01-07 NOTE — TELEPHONE ENCOUNTER
Last visit: 10/16/24  Last Med refill: 12/17/24  Does patient have enough medication for 72 hours: Yes    Next Visit Date:  Future Appointments   Date Time Provider Department Center   3/25/2025  9:30 AM STV STA CHAIR 09 STVZ STA MED [place]   4/16/2025 11:00 AM Ludivina Grimm MD [facility] ECC DEP   5/2/2025  8:30 AM Germán Christine MD heartvasc [facility]   6/12/2025  9:20 AM Donato Dueñas MD Resp Spec [facility]       Health Maintenance   Topic Date Due    Hepatitis B vaccine (1 of 3 - 19+ 3-dose series) Never done    COVID-19 Vaccine (5 - 2023-24 season) 09/01/2024    Annual Wellness Visit (Medicare Advantage)  Never done    Depression Monitoring  01/24/2025    Breast cancer screen  08/14/2025    Lipids  11/01/2025    Lung Cancer Screening &/or Counseling  12/12/2025    DTaP/Tdap/Td vaccine (2 - Td or Tdap) 12/28/2030    Colorectal Cancer Screen  03/14/2034    Flu vaccine  Completed    Shingles vaccine  Completed    Pneumococcal 0-64 years Vaccine  Completed    Hepatitis C screen  Completed    HIV screen  Completed    Hepatitis A vaccine  Aged Out    Hib vaccine  Aged Out    Polio vaccine  Aged Out    Meningococcal (ACWY) vaccine  Aged Out       Hemoglobin A1C (%)   Date Value   04/13/2021 5.5   07/14/2019 4.3             ( goal A1C is < 7)   No components found for: \"LABMICR\"  No components found for: \"LDLCHOLESTEROL\", \"LDLCALC\"    (goal LDL is <100)   AST (U/L)   Date Value   11/01/2024 17     ALT (U/L)   Date Value   11/01/2024 6 (L)     BUN (mg/dL)   Date Value   11/01/2024 6     BP Readings from Last 3 Encounters:   12/09/24 101/74   11/01/24 (!) 143/93   10/16/24 100/60          (goal 120/80)    All Future Testing planned in [facility]  Lab Frequency Next Occurrence   EGD Routine Once 03/05/2024   COLONOSCOPY (Diagnostic) Once 03/05/2024   DEXA BONE DENSITY 2 SITES Once 06/04/2024   Vascular lower arterial complete physiologic Doppler at rest Once 05/01/2025               Patient Active

## 2025-02-04 RX ORDER — FLUTICASONE PROPIONATE 50 MCG
2 SPRAY, SUSPENSION (ML) NASAL DAILY
Qty: 1 EACH | Refills: 4 | Status: SHIPPED | OUTPATIENT
Start: 2025-02-04

## 2025-02-04 NOTE — TELEPHONE ENCOUNTER
LAST VISIT: 12/9/24 with Dr Sesay  NEXT VISIT: 6/12/25 with Dr Dueñas    Please sign for refill if ok. Thank you.

## 2025-03-03 DIAGNOSIS — G43.009 MIGRAINE WITHOUT AURA AND WITHOUT STATUS MIGRAINOSUS, NOT INTRACTABLE: ICD-10-CM

## 2025-03-03 DIAGNOSIS — G40.909 SEIZURE DISORDER (HCC): ICD-10-CM

## 2025-03-03 NOTE — TELEPHONE ENCOUNTER
Pharmacy requesting refill of atorvastatin 20 mg, carbamazepine 200 mg, topiramate 50 mg.      Medication active on med list yes      Date of last Rx: 9/20/2024 with 5 refills          verified by JEMIMA JONES      Date of last appointment 2/22/2024    Next Visit Date:  5/12/2025

## 2025-03-06 RX ORDER — TOPIRAMATE 50 MG/1
TABLET, FILM COATED ORAL
Qty: 30 TABLET | Refills: 5 | Status: SHIPPED | OUTPATIENT
Start: 2025-03-06

## 2025-03-06 RX ORDER — CARBAMAZEPINE 200 MG/1
TABLET ORAL
Qty: 90 TABLET | Refills: 5 | Status: SHIPPED | OUTPATIENT
Start: 2025-03-06

## 2025-03-06 RX ORDER — ATORVASTATIN CALCIUM 20 MG/1
TABLET, FILM COATED ORAL
Qty: 30 TABLET | Refills: 5 | Status: SHIPPED | OUTPATIENT
Start: 2025-03-06 | End: 2025-09-03

## 2025-03-22 ENCOUNTER — HOSPITAL ENCOUNTER (OUTPATIENT)
Facility: MEDICAL CENTER | Age: 55
End: 2025-03-22

## 2025-03-26 NOTE — TELEPHONE ENCOUNTER
Patient called stating the Magnesium oxide you prescribed caused diarrhea. She got some Magnesium glycinate 450mg she stated this is working for her and wanted to know if the mgs are ok and if this ok to take? ADHD

## 2025-04-02 DIAGNOSIS — I10 ESSENTIAL HYPERTENSION: ICD-10-CM

## 2025-04-02 DIAGNOSIS — J42 CHRONIC BRONCHITIS, UNSPECIFIED CHRONIC BRONCHITIS TYPE (HCC): ICD-10-CM

## 2025-04-02 DIAGNOSIS — E87.1 HYPONATREMIA: ICD-10-CM

## 2025-04-02 DIAGNOSIS — E87.6 HYPOKALEMIA: ICD-10-CM

## 2025-04-02 DIAGNOSIS — N39.41 URGE INCONTINENCE OF URINE: ICD-10-CM

## 2025-04-02 RX ORDER — ALBUTEROL SULFATE 90 UG/1
2 AEROSOL, METERED RESPIRATORY (INHALATION) 4 TIMES DAILY PRN
Qty: 3 EACH | Refills: 1 | Status: SHIPPED | OUTPATIENT
Start: 2025-04-02

## 2025-04-02 RX ORDER — SODIUM CHLORIDE 1 G/1
TABLET ORAL
Qty: 84 TABLET | Refills: 1 | Status: SHIPPED | OUTPATIENT
Start: 2025-04-02

## 2025-04-02 RX ORDER — AMLODIPINE BESYLATE 10 MG/1
TABLET ORAL
Qty: 90 TABLET | Refills: 1 | Status: SHIPPED | OUTPATIENT
Start: 2025-04-02

## 2025-04-02 NOTE — TELEPHONE ENCOUNTER
Last visit: 10/16/24  Last Med refill: 03/12/25  Does patient have enough medication for 72 hours: Yes    Next Visit Date:  Future Appointments   Date Time Provider Department Center   4/16/2025 11:00 AM Ludivina Grimm MD [facility] ECC DEP   5/2/2025  8:30 [facility]   5/12/2025  9:40 AM Vanda Coyle MD Neuro Spec Neurology -   6/12/2025  9:20 AM Donato Dueñas MD Resp Spec [facility]       Health Maintenance   Topic Date Due    Hepatitis B vaccine (1 of 3 - 19+ 3-dose series) Never done    COVID-19 Vaccine (5 - 2024-25 season) 09/01/2024    Annual Wellness Visit (Medicare Advantage)  Never done    Depression Monitoring  01/24/2025    Breast cancer screen  08/14/2025    Lipids  11/01/2025    Lung Cancer Screening &/or Counseling  12/12/2025    DTaP/Tdap/Td vaccine (2 - Td or Tdap) 12/28/2030    Colorectal Cancer Screen  03/14/2034    Flu vaccine  Completed    Shingles vaccine  Completed    Pneumococcal 50+ years Vaccine  Completed    Hepatitis C screen  Completed    HIV screen  Completed    Hepatitis A vaccine  Aged Out    Hib vaccine  Aged Out    Polio vaccine  Aged Out    Meningococcal (ACWY) vaccine  Aged Out    Meningococcal B vaccine  Aged Out    Pneumococcal 0-49 years Vaccine  Discontinued       Hemoglobin A1C (%)   Date Value   04/13/2021 5.5   07/14/2019 4.3             ( goal A1C is < 7)   No components found for: \"LABMICR\"  No components found for: \"LDLCHOLESTEROL\", \"LDLCALC\"    (goal LDL is <100)   AST (U/L)   Date Value   11/01/2024 17     ALT (U/L)   Date Value   11/01/2024 6 (L)     BUN (mg/dL)   Date Value   11/01/2024 6     BP Readings from Last 3 Encounters:   12/09/24 101/74   11/01/24 (!) 143/93   10/16/24 100/60          (goal 120/80)    All Future Testing planned in [facility]  Lab Frequency Next Occurrence   DEXA BONE DENSITY 2 SITES Once 06/04/2024   Vascular lower arterial complete physiologic Doppler at rest Once 05/01/2025

## 2025-04-02 NOTE — TELEPHONE ENCOUNTER
LAST VISIT: 12/9/24 with Dr Sesay  NEXT VISIT: 6/12/25 with Dr Dueñas    Per last dictation patient to continue this medication. Please sign for refill if ok. Thank you.

## 2025-04-03 DIAGNOSIS — I70.212 ATHEROSCLER OF NATIVE ARTERY OF LEFT LEG WITH INTERMIT CLAUDICATION: ICD-10-CM

## 2025-04-03 RX ORDER — FLUTICASONE FUROATE, UMECLIDINIUM BROMIDE AND VILANTEROL TRIFENATATE 100; 62.5; 25 UG/1; UG/1; UG/1
POWDER RESPIRATORY (INHALATION)
Qty: 3 EACH | Refills: 1 | Status: SHIPPED | OUTPATIENT
Start: 2025-04-03

## 2025-04-16 ENCOUNTER — OFFICE VISIT (OUTPATIENT)
Dept: FAMILY MEDICINE CLINIC | Age: 55
End: 2025-04-16
Payer: MEDICARE

## 2025-04-16 VITALS
DIASTOLIC BLOOD PRESSURE: 78 MMHG | WEIGHT: 135 LBS | SYSTOLIC BLOOD PRESSURE: 118 MMHG | HEART RATE: 76 BPM | OXYGEN SATURATION: 98 % | BODY MASS INDEX: 22.49 KG/M2 | HEIGHT: 65 IN

## 2025-04-16 DIAGNOSIS — J96.11 CHRONIC RESPIRATORY FAILURE WITH HYPOXIA: ICD-10-CM

## 2025-04-16 DIAGNOSIS — Z00.00 WELCOME TO MEDICARE PREVENTIVE VISIT: Primary | ICD-10-CM

## 2025-04-16 DIAGNOSIS — K86.0 ALCOHOL-INDUCED CHRONIC PANCREATITIS (HCC): ICD-10-CM

## 2025-04-16 DIAGNOSIS — G40.909 SEIZURE DISORDER (HCC): ICD-10-CM

## 2025-04-16 DIAGNOSIS — Z87.891 PERSONAL HISTORY OF TOBACCO USE, PRESENTING HAZARDS TO HEALTH: ICD-10-CM

## 2025-04-16 DIAGNOSIS — Z71.89 ACP (ADVANCE CARE PLANNING): ICD-10-CM

## 2025-04-16 DIAGNOSIS — J44.9 COPD, MODERATE (HCC): ICD-10-CM

## 2025-04-16 DIAGNOSIS — C71.9 ASTROCYTOMA (HCC): ICD-10-CM

## 2025-04-16 DIAGNOSIS — F33.2 MAJOR DEPRESSIVE DISORDER, RECURRENT SEVERE WITHOUT PSYCHOTIC FEATURES (HCC): ICD-10-CM

## 2025-04-16 DIAGNOSIS — Z13.6 SCREENING FOR CARDIOVASCULAR CONDITION: ICD-10-CM

## 2025-04-16 PROCEDURE — G0402 INITIAL PREVENTIVE EXAM: HCPCS | Performed by: INTERNAL MEDICINE

## 2025-04-16 PROCEDURE — 3078F DIAST BP <80 MM HG: CPT | Performed by: INTERNAL MEDICINE

## 2025-04-16 PROCEDURE — 3017F COLORECTAL CA SCREEN DOC REV: CPT | Performed by: INTERNAL MEDICINE

## 2025-04-16 PROCEDURE — 99406 BEHAV CHNG SMOKING 3-10 MIN: CPT | Performed by: INTERNAL MEDICINE

## 2025-04-16 PROCEDURE — 3074F SYST BP LT 130 MM HG: CPT | Performed by: INTERNAL MEDICINE

## 2025-04-16 PROCEDURE — G0446 INTENS BEHAVE THER CARDIO DX: HCPCS | Performed by: INTERNAL MEDICINE

## 2025-04-16 PROCEDURE — 99497 ADVNCD CARE PLAN 30 MIN: CPT | Performed by: INTERNAL MEDICINE

## 2025-04-16 SDOH — ECONOMIC STABILITY: INCOME INSECURITY: IN THE LAST 12 MONTHS, WAS THERE A TIME WHEN YOU WERE NOT ABLE TO PAY THE MORTGAGE OR RENT ON TIME?: NO

## 2025-04-16 SDOH — HEALTH STABILITY: PHYSICAL HEALTH: ON AVERAGE, HOW MANY DAYS PER WEEK DO YOU ENGAGE IN MODERATE TO STRENUOUS EXERCISE (LIKE A BRISK WALK)?: 1 DAY

## 2025-04-16 SDOH — ECONOMIC STABILITY: FOOD INSECURITY: WITHIN THE PAST 12 MONTHS, THE FOOD YOU BOUGHT JUST DIDN'T LAST AND YOU DIDN'T HAVE MONEY TO GET MORE.: NEVER TRUE

## 2025-04-16 SDOH — ECONOMIC STABILITY: FOOD INSECURITY: WITHIN THE PAST 12 MONTHS, YOU WORRIED THAT YOUR FOOD WOULD RUN OUT BEFORE YOU GOT MONEY TO BUY MORE.: SOMETIMES TRUE

## 2025-04-16 SDOH — ECONOMIC STABILITY: TRANSPORTATION INSECURITY
IN THE PAST 12 MONTHS, HAS THE LACK OF TRANSPORTATION KEPT YOU FROM MEDICAL APPOINTMENTS OR FROM GETTING MEDICATIONS?: NO

## 2025-04-16 ASSESSMENT — PATIENT HEALTH QUESTIONNAIRE - PHQ9
SUM OF ALL RESPONSES TO PHQ QUESTIONS 1-9: 6
SUM OF ALL RESPONSES TO PHQ QUESTIONS 1-9: 6
3. TROUBLE FALLING OR STAYING ASLEEP: SEVERAL DAYS
SUM OF ALL RESPONSES TO PHQ QUESTIONS 1-9: 6
7. TROUBLE CONCENTRATING ON THINGS, SUCH AS READING THE NEWSPAPER OR WATCHING TELEVISION: SEVERAL DAYS
10. IF YOU CHECKED OFF ANY PROBLEMS, HOW DIFFICULT HAVE THESE PROBLEMS MADE IT FOR YOU TO DO YOUR WORK, TAKE CARE OF THINGS AT HOME, OR GET ALONG WITH OTHER PEOPLE: SOMEWHAT DIFFICULT
1. LITTLE INTEREST OR PLEASURE IN DOING THINGS: SEVERAL DAYS
4. FEELING TIRED OR HAVING LITTLE ENERGY: SEVERAL DAYS
2. FEELING DOWN, DEPRESSED OR HOPELESS: SEVERAL DAYS
SUM OF ALL RESPONSES TO PHQ QUESTIONS 1-9: 6
9. THOUGHTS THAT YOU WOULD BE BETTER OFF DEAD, OR OF HURTING YOURSELF: NOT AT ALL
6. FEELING BAD ABOUT YOURSELF - OR THAT YOU ARE A FAILURE OR HAVE LET YOURSELF OR YOUR FAMILY DOWN: NOT AT ALL
5. POOR APPETITE OR OVEREATING: SEVERAL DAYS

## 2025-04-16 ASSESSMENT — LIFESTYLE VARIABLES
HOW MANY STANDARD DRINKS CONTAINING ALCOHOL DO YOU HAVE ON A TYPICAL DAY: 0
HOW MANY STANDARD DRINKS CONTAINING ALCOHOL DO YOU HAVE ON A TYPICAL DAY: PATIENT DOES NOT DRINK
HOW OFTEN DO YOU HAVE SIX OR MORE DRINKS ON ONE OCCASION: 1
HOW OFTEN DO YOU HAVE A DRINK CONTAINING ALCOHOL: 1
HOW OFTEN DO YOU HAVE A DRINK CONTAINING ALCOHOL: NEVER

## 2025-04-16 NOTE — PROGRESS NOTES
Medicare Annual Wellness Visit    Barbara White is here for Medicare AWV    Assessment & Plan   Welcome to Medicare preventive visit  ACP (advance care planning)  -     MS Advanced Care Planning (16-30 minutes) [96057]  Personal history of tobacco use, presenting hazards to health  -     MS Smoking and Tobacco Use Cessation (Intermediate): 3-10 MINUTES [43763]  Screening for cardiovascular condition  -     MS Intensive Behavior Counseling for Cardiovascular Diseases, 8-15 minutes []  Major depressive disorder, recurrent severe without psychotic features (HCC)  Astrocytoma (HCC)  Chronic respiratory failure with hypoxia  Alcohol-induced chronic pancreatitis (HCC)  COPD, moderate (HCC)  Seizure disorder (HCC)       No follow-ups on file.     Subjective   The following acute and/or chronic problems were also addressed today:  Her mother has been diagnosed with metastatic cancer, \"currently in a holding pattern\". Tearful a lot, seeing psychiatrist and therapist, she does not recall when the last time her meds were adjusted but believes it would help. Has an appointment coming up on 4/28 with both therapist and psychiatrist and plans to discuss then - going to [facility]   Smoking more as she has been more stressed - 1 ppd now     Patient's complete Health Risk Assessment and screening values have been reviewed and are found in Flowsheets. The following problems were reviewed today and where indicated follow up appointments were made and/or referrals ordered.    Positive Risk Factor Screenings with Interventions:      Depression:  PHQ-2 Score: (Patient-Rptd) 2  PHQ-9 Total Score: 6  Total Score Interpretation: 5-9 = mild depression    Interventions:  Monitored by specialist. No acute findings meriting change in the plan     Drug Use:   Substance and Sexual Activity   Drug Use Yes    Frequency: 2.0 times per week    Types: Marijuana (Weed)    Comment: recreation, maybe beginning of November 2023

## 2025-04-16 NOTE — PATIENT INSTRUCTIONS
Talk to your psychiatrist about increasing your meds to help with coping with your current situation regarding your mothers health.        Learning About Mindfulness for Stress  What are mindfulness and stress?     Stress is your body's response to a hard situation. Your body can have a physical, emotional, or mental response. A lot of things can cause stress. You may feel stress when you go on a job interview, take a test, or run a race. This kind of short-term stress is normal and even useful. It can help you if you need to work hard or react quickly.  Stress also can last a long time. Long-term stress is caused by stressful situations or events. Examples of long-term stress include long-term health problems, ongoing problems at work, and conflicts in your family. Long-term stress can harm your health.  Mindfulness is a focus only on things happening in the present moment. It's a process of purposefully paying attention to and being aware of your surroundings, your emotions, your thoughts, and how your body feels. You are aware of these things, but you aren't judging these experiences as \"good\" or \"bad.\" Mindfulness can help you learn to calm your mind and body to help you cope with illness, pain, and stress.  How does mindfulness help to relieve stress?  Mindfulness can help quiet your mind and relax your body. Studies show that it can help some people sleep better, feel less anxious, and bring their blood pressure down. And it's been shown to help some people live and cope better with certain health problems like heart disease, depression, chronic pain, and cancer.  How do you practice mindfulness?  To be mindful is to pay attention, to be present, and to be accepting. Like any new skill or habit, being mindful can take practice.  When you're mindful, you do just one thing and you pay close attention to that one thing. For example, you may sit quietly and notice your emotions or how your food tastes and

## 2025-04-17 ENCOUNTER — TELEPHONE (OUTPATIENT)
Dept: GASTROENTEROLOGY | Age: 55
End: 2025-04-17

## 2025-04-17 NOTE — TELEPHONE ENCOUNTER
[facility] called stating that patient is due to run out of medications today. Requesting refill for Prilosec, Reglan, and Carafate.

## 2025-05-01 ENCOUNTER — HOSPITAL ENCOUNTER (OUTPATIENT)
Dept: VASCULAR LAB | Age: 55
Discharge: HOME | End: 2025-05-03
Attending: SURGERY
Payer: MEDICARE

## 2025-05-01 LAB
VAS LEFT ABI: 0.78
VAS LEFT ANKLE BP: 74 MMHG
VAS LEFT ARM BP: 95 MMHG
VAS LEFT CALF PRESSURE: 83 MMHG
VAS LEFT DORSALIS PEDIS BP: 73 MMHG
VAS LEFT PTA BP: 74 MMHG
VAS LEFT TBI: 0.64
VAS LEFT THIGH PRESSURE: 84 MMHG
VAS LEFT TOE PRESSURE: 61 MMHG
VAS RIGHT ABI: 1.19
VAS RIGHT ANKLE BP: 113 MMHG
VAS RIGHT ARM BP: 90 MMHG
VAS RIGHT CALF PRESSURE: 98 MMHG
VAS RIGHT DORSALIS PEDIS BP: 95 MMHG
VAS RIGHT PTA BP: 113 MMHG
VAS RIGHT TBI: 0.93
VAS RIGHT THIGH PRESSURE: 119 MMHG
VAS RIGHT TOE PRESSURE: 88 MMHG

## 2025-05-01 PROCEDURE — 93923 UPR/LXTR ART STDY 3+ LVLS: CPT

## 2025-05-15 ENCOUNTER — OFFICE VISIT (OUTPATIENT)
Dept: GASTROENTEROLOGY | Age: 55
End: 2025-05-15
Payer: MEDICARE

## 2025-05-15 VITALS
SYSTOLIC BLOOD PRESSURE: 109 MMHG | RESPIRATION RATE: 18 BRPM | BODY MASS INDEX: 21.83 KG/M2 | WEIGHT: 131 LBS | DIASTOLIC BLOOD PRESSURE: 71 MMHG | TEMPERATURE: 97.7 F | HEIGHT: 65 IN | HEART RATE: 77 BPM

## 2025-05-15 DIAGNOSIS — K86.0 ALCOHOL-INDUCED CHRONIC PANCREATITIS (HCC): ICD-10-CM

## 2025-05-15 DIAGNOSIS — K74.60 CIRRHOSIS OF LIVER WITHOUT ASCITES, UNSPECIFIED HEPATIC CIRRHOSIS TYPE (HCC): Primary | ICD-10-CM

## 2025-05-15 DIAGNOSIS — R13.19 ESOPHAGEAL DYSPHAGIA: ICD-10-CM

## 2025-05-15 DIAGNOSIS — K31.84 GASTROPARESIS: ICD-10-CM

## 2025-05-15 PROCEDURE — 99214 OFFICE O/P EST MOD 30 MIN: CPT | Performed by: INTERNAL MEDICINE

## 2025-05-15 PROCEDURE — 3017F COLORECTAL CA SCREEN DOC REV: CPT | Performed by: INTERNAL MEDICINE

## 2025-05-15 PROCEDURE — 3074F SYST BP LT 130 MM HG: CPT | Performed by: INTERNAL MEDICINE

## 2025-05-15 PROCEDURE — 4004F PT TOBACCO SCREEN RCVD TLK: CPT | Performed by: INTERNAL MEDICINE

## 2025-05-15 PROCEDURE — 3078F DIAST BP <80 MM HG: CPT | Performed by: INTERNAL MEDICINE

## 2025-05-15 PROCEDURE — G2211 COMPLEX E/M VISIT ADD ON: HCPCS | Performed by: INTERNAL MEDICINE

## 2025-05-15 PROCEDURE — G8427 DOCREV CUR MEDS BY ELIG CLIN: HCPCS | Performed by: INTERNAL MEDICINE

## 2025-05-15 PROCEDURE — G8420 CALC BMI NORM PARAMETERS: HCPCS | Performed by: INTERNAL MEDICINE

## 2025-05-15 RX ORDER — PANCRELIPASE 24000; 76000; 120000 [USP'U]/1; [USP'U]/1; [USP'U]/1
1 CAPSULE, DELAYED RELEASE PELLETS ORAL
Qty: 270 CAPSULE | Refills: 3 | Status: SHIPPED | OUTPATIENT
Start: 2025-05-15 | End: 2025-08-13

## 2025-05-15 RX ORDER — PRUCALOPRIDE 2 MG/1
2 TABLET ORAL DAILY
Qty: 90 TABLET | Refills: 3 | Status: SHIPPED | OUTPATIENT
Start: 2025-05-15 | End: 2025-05-16

## 2025-05-15 RX ORDER — OMEPRAZOLE 40 MG/1
CAPSULE, DELAYED RELEASE ORAL
Qty: 180 CAPSULE | Refills: 2 | Status: SHIPPED | OUTPATIENT
Start: 2025-05-15

## 2025-05-15 RX ORDER — METOCLOPRAMIDE 5 MG/1
TABLET ORAL
Qty: 360 TABLET | Refills: 2 | Status: SHIPPED | OUTPATIENT
Start: 2025-05-15

## 2025-05-15 ASSESSMENT — ENCOUNTER SYMPTOMS
ABDOMINAL DISTENTION: 0
RECTAL PAIN: 0
CONSTIPATION: 0
TROUBLE SWALLOWING: 1
CHOKING: 0
BLOOD IN STOOL: 0
VOICE CHANGE: 0
ABDOMINAL PAIN: 0
COUGH: 0
ANAL BLEEDING: 0
DIARRHEA: 0
VOMITING: 0
SORE THROAT: 0
NAUSEA: 0
WHEEZING: 0

## 2025-05-15 NOTE — PROGRESS NOTES
including those of syntax and sound a like substitutions which may escape proof reading.  It such instances, actual meaning can be extrapolated by contextual diversion.

## 2025-05-16 ENCOUNTER — OFFICE VISIT (OUTPATIENT)
Dept: VASCULAR SURGERY | Age: 55
End: 2025-05-16
Payer: MEDICARE

## 2025-05-16 VITALS
SYSTOLIC BLOOD PRESSURE: 122 MMHG | BODY MASS INDEX: 21.96 KG/M2 | HEART RATE: 73 BPM | OXYGEN SATURATION: 98 % | WEIGHT: 131.8 LBS | HEIGHT: 65 IN | TEMPERATURE: 99 F | DIASTOLIC BLOOD PRESSURE: 83 MMHG

## 2025-05-16 DIAGNOSIS — I70.213 ATHEROSCLER OF NATIVE ARTERY OF BOTH LEGS WITH INTERMIT CLAUDICATION: Primary | ICD-10-CM

## 2025-05-16 PROCEDURE — 3017F COLORECTAL CA SCREEN DOC REV: CPT | Performed by: SURGERY

## 2025-05-16 PROCEDURE — 3074F SYST BP LT 130 MM HG: CPT | Performed by: SURGERY

## 2025-05-16 PROCEDURE — G8427 DOCREV CUR MEDS BY ELIG CLIN: HCPCS | Performed by: SURGERY

## 2025-05-16 PROCEDURE — 99213 OFFICE O/P EST LOW 20 MIN: CPT | Performed by: SURGERY

## 2025-05-16 PROCEDURE — 4004F PT TOBACCO SCREEN RCVD TLK: CPT | Performed by: SURGERY

## 2025-05-16 PROCEDURE — 3079F DIAST BP 80-89 MM HG: CPT | Performed by: SURGERY

## 2025-05-16 PROCEDURE — G8420 CALC BMI NORM PARAMETERS: HCPCS | Performed by: SURGERY

## 2025-05-16 RX ORDER — PRUCALOPRIDE 2 MG/1
2 TABLET ORAL DAILY
Qty: 90 TABLET | Refills: 3 | Status: SHIPPED | OUTPATIENT
Start: 2025-05-16

## 2025-05-16 NOTE — PROGRESS NOTES
[facility], [facility]  [address] [address] [address]  [place] [address]  Dept: [phone]     Patient: Barbara White  : 1970  MRN: [id number]  DOS: 2025            HPI:  Barbara Whiet is a 54 y.o. female who returns to the office regarding her lower extremity arterial disease.  Recall that she has left lower extremity femoral-popliteal disease.  She had ESE and PVR revealing normal ESE on the right with a normal PVR waveform analysis on the right and an ESE on the left of 0.8 with a PVR waveform that is nearly normal.  Interestingly it indicates more inflow disease and SFA disease on the PVR waveform analysis.  This is not consistent with her physical examination.  She continues to smoke cigarettes albeit minimally despite warnings to the contrary.    Review of Systems    Vitals:    25 1017   BP: 122/83   BP Site: Right Upper Arm   Patient Position: Sitting   BP Cuff Size: Medium Adult   Pulse: 73   Temp: 99 °F (37.2 °C)   TempSrc: Temporal   SpO2: 98%   Weight: 59.8 kg (131 lb 12.8 oz)   Height: 1.651 m (5' 5\")          Physical Exam  On examination she has palpable femoral popliteal dorsalis pedis and posterior tibial pulses on the right.  The right is warm and well-perfused without ulceration or gangrene.  She has no dependent rubor.  The left has a palpable femoral pulse but no popliteal dorsalis pedis or posterior tibial pulse.  She has no dependent rubor.  There is no ulcer or gangrene.    Assessment:  1. Atheroscler of native artery of both legs with intermit claudication          Plan:  At this time we will continue to follow her at yearly intervals with ESE and PVR.  She understands the importance of smoking cessation and I have reiterated how important it is for her lower extremity arterial health.  She agrees to stop smoking once again and we will see her in 1 year unless she has problems.  She

## 2025-05-30 RX ORDER — CLOPIDOGREL BISULFATE 75 MG/1
75 TABLET ORAL DAILY
Qty: 90 TABLET | Refills: 5 | Status: SHIPPED | OUTPATIENT
Start: 2025-05-30

## 2025-06-03 ENCOUNTER — TELEPHONE (OUTPATIENT)
Dept: GASTROENTEROLOGY | Age: 55
End: 2025-06-03

## 2025-06-03 NOTE — TELEPHONE ENCOUNTER
6/3/25- 1st attempt- Called pt and LVM to call the office to brice appt on 9/2/25 due to provider being on call.    **Verify which location pt would like

## 2025-06-10 ENCOUNTER — OFFICE VISIT (OUTPATIENT)
Dept: NEUROLOGY | Age: 55
End: 2025-06-10
Payer: MEDICARE

## 2025-06-10 VITALS
DIASTOLIC BLOOD PRESSURE: 70 MMHG | WEIGHT: 129.4 LBS | HEIGHT: 65 IN | HEART RATE: 76 BPM | SYSTOLIC BLOOD PRESSURE: 108 MMHG | BODY MASS INDEX: 21.56 KG/M2

## 2025-06-10 DIAGNOSIS — G62.1 ALCOHOLIC PERIPHERAL NEUROPATHY: ICD-10-CM

## 2025-06-10 DIAGNOSIS — G43.009 MIGRAINE WITHOUT AURA AND WITHOUT STATUS MIGRAINOSUS, NOT INTRACTABLE: ICD-10-CM

## 2025-06-10 DIAGNOSIS — G57.93 NEUROPATHIC PAIN OF BOTH LEGS: Primary | ICD-10-CM

## 2025-06-10 DIAGNOSIS — F10.11 ALCOHOL ABUSE, IN REMISSION: ICD-10-CM

## 2025-06-10 DIAGNOSIS — G40.909 SEIZURE DISORDER (HCC): ICD-10-CM

## 2025-06-10 DIAGNOSIS — F10.10 ALCOHOL ABUSE, UNCOMPLICATED: ICD-10-CM

## 2025-06-10 PROCEDURE — G8427 DOCREV CUR MEDS BY ELIG CLIN: HCPCS | Performed by: PSYCHIATRY & NEUROLOGY

## 2025-06-10 PROCEDURE — 3074F SYST BP LT 130 MM HG: CPT | Performed by: PSYCHIATRY & NEUROLOGY

## 2025-06-10 PROCEDURE — 99214 OFFICE O/P EST MOD 30 MIN: CPT | Performed by: PSYCHIATRY & NEUROLOGY

## 2025-06-10 PROCEDURE — G8420 CALC BMI NORM PARAMETERS: HCPCS | Performed by: PSYCHIATRY & NEUROLOGY

## 2025-06-10 PROCEDURE — 3017F COLORECTAL CA SCREEN DOC REV: CPT | Performed by: PSYCHIATRY & NEUROLOGY

## 2025-06-10 PROCEDURE — 4004F PT TOBACCO SCREEN RCVD TLK: CPT | Performed by: PSYCHIATRY & NEUROLOGY

## 2025-06-10 PROCEDURE — 3078F DIAST BP <80 MM HG: CPT | Performed by: PSYCHIATRY & NEUROLOGY

## 2025-06-10 RX ORDER — GABAPENTIN 100 MG/1
100 CAPSULE ORAL 3 TIMES DAILY
Qty: 90 CAPSULE | Refills: 5 | Status: SHIPPED | OUTPATIENT
Start: 2025-06-10 | End: 2025-12-07

## 2025-06-10 RX ORDER — TRAZODONE HYDROCHLORIDE 100 MG/1
TABLET ORAL
COMMUNITY
Start: 2025-05-30

## 2025-06-10 NOTE — PROGRESS NOTES
recent attack and Maxalt took care of that migraine attack.  But she also stated that she had a much lesser severe migraine attacks.  Therefore, to continue Topamax for prophylaxis and Maxalt prn for rescue therapy.   Chronic ETOH: sober since 2023; hx of etoh since teenage years; Heavy use of \"etoh for >5 yrs in early 40s\"  Peripheral neuropathy with \" pins and needles in lower legs/ feet & both hands\"  likely secondary to her alcohol abuse: \"terrible painful sensations in both legs and hands worse since early this year\".  Able to tolerate Gabapentin 100 mg tid, so to continue same dose with Cymbalta 60 qd well; therefore to continue the same dose for neuropathic pain relief.  Follow up in 6 months.         Total time spent for this encounter: not billed by time  This note was partially created using voice recognition software and is inherently subject to errors including those of syntax and \"sound alike\" substitutions which may escape proofreading.  In such instances, original meaning may be extrapolated by contextual derivation.

## 2025-06-12 ENCOUNTER — OFFICE VISIT (OUTPATIENT)
Dept: PULMONOLOGY | Age: 55
End: 2025-06-12
Payer: MEDICARE

## 2025-06-12 VITALS
SYSTOLIC BLOOD PRESSURE: 106 MMHG | RESPIRATION RATE: 14 BRPM | DIASTOLIC BLOOD PRESSURE: 68 MMHG | HEART RATE: 79 BPM | WEIGHT: 131 LBS | HEIGHT: 65 IN | BODY MASS INDEX: 21.83 KG/M2 | OXYGEN SATURATION: 100 %

## 2025-06-12 DIAGNOSIS — J96.11 CHRONIC RESPIRATORY FAILURE WITH HYPOXIA (HCC): ICD-10-CM

## 2025-06-12 DIAGNOSIS — Z72.0 TOBACCO USE: ICD-10-CM

## 2025-06-12 DIAGNOSIS — J44.0 CHRONIC OBSTRUCTIVE PULMONARY DISEASE WITH ACUTE LOWER RESPIRATORY INFECTION (HCC): ICD-10-CM

## 2025-06-12 DIAGNOSIS — J44.1 COPD EXACERBATION (HCC): Primary | ICD-10-CM

## 2025-06-12 PROCEDURE — 99214 OFFICE O/P EST MOD 30 MIN: CPT | Performed by: INTERNAL MEDICINE

## 2025-06-12 PROCEDURE — 3017F COLORECTAL CA SCREEN DOC REV: CPT | Performed by: INTERNAL MEDICINE

## 2025-06-12 PROCEDURE — G8420 CALC BMI NORM PARAMETERS: HCPCS | Performed by: INTERNAL MEDICINE

## 2025-06-12 PROCEDURE — G8427 DOCREV CUR MEDS BY ELIG CLIN: HCPCS | Performed by: INTERNAL MEDICINE

## 2025-06-12 PROCEDURE — 3078F DIAST BP <80 MM HG: CPT | Performed by: INTERNAL MEDICINE

## 2025-06-12 PROCEDURE — 3074F SYST BP LT 130 MM HG: CPT | Performed by: INTERNAL MEDICINE

## 2025-06-12 PROCEDURE — 3023F SPIROM DOC REV: CPT | Performed by: INTERNAL MEDICINE

## 2025-06-12 PROCEDURE — 4004F PT TOBACCO SCREEN RCVD TLK: CPT | Performed by: INTERNAL MEDICINE

## 2025-06-12 NOTE — PROGRESS NOTES
Subjective:      Patient ID: Barbara White is a 54 y.o. adult.     HPI  Medications:   Requip 1 mg nightly  Trelegy Ellipta  Albuterol HFA:   Oxygen 3 LPM    Patient is doing well.  She has COPD.  She is being treated with Trelegy and albuterol.  Unfortunately she continued to smoke in spite of repeated advised to give up smoking.  She promised that she will try to smoke quit smoking.  She denies any symptoms of upper respiratory infection.  Sputum is not purulent no blood in it no chest pain no pedal edema no thromboembolic process no increase in the dyspnea.    He is known to have right apical lung nodule which has been stable    Last 6 months will get a repeat CT and CT scan in 6 months next visit.    She does use supplemental oxygen at night at 3 L/min.    She has not noted any evidence of pedal edema or thromboembolic process.    She has history of hypertension which is well-controlled no heart failure no pedal edema no thromboembolic process.  Already taken her  Vaccination.  Her oxygen saturation room air in the office was 100%.  Blood pressure normal     PRIOR WORKUP:  PFT:  6-minute walk test 11/1/2022: Patient was on room air and dropped down to 87% with ambulation.  Required supplemental oxygen to maintain an SPO2 of greater than 90% with 3 L.     PFT 11/1/2022: FVC 2.10 L, 65% of predicted.  FEV1 1.52 L, 58% of predicted.  FEV1/FVC ratio 72, 89% of predicted.  FEF 25-75 is 1.08, 39% of predicted.  RV is 2.20, 118% of predicted.  TLC is 3.65, 71% of predicted.  DSB is 5.26, 26% of predicted.  Final impression: Mild discrete ventilatory restrictive dysfunction.  Diffusion capacity is reduced out of proportion to ventilatory dysfunction     CT Imaging:  CT chest 1/1/2023: Negative for pulmonary embolism.  Enlarged right hilar lymph node with other lymph nodes that are shotty in appearance.  Groundglass background opacities distributed diffusely but heterogenously in a mosaic type pattern.  Suggesting

## 2025-06-20 DIAGNOSIS — E87.1 HYPONATREMIA: ICD-10-CM

## 2025-06-20 DIAGNOSIS — I10 ESSENTIAL HYPERTENSION: ICD-10-CM

## 2025-06-20 DIAGNOSIS — E87.6 HYPOKALEMIA: ICD-10-CM

## 2025-06-20 DIAGNOSIS — N39.41 URGE INCONTINENCE OF URINE: ICD-10-CM

## 2025-06-20 NOTE — TELEPHONE ENCOUNTER
Bilirubin Once 05/15/2025   Protime-INR Once 05/15/2025   AFP Tumor Marker Once 05/15/2025   Vascular lower arterial complete physiologic Doppler at rest Once 05/15/2026               Patient Active Problem List:     Acute bronchitis     Reflex sympathetic dystrophy of lower limb     Essential hypertension     Nicotine addiction     Psychophysiological insomnia     Anxiety     Alcoholic peripheral neuropathy     Macrocytic anemia     Tobacco use     Ambulatory dysfunction     Seizure disorder (HCC)     COPD exacerbation (HCC)     Paresthesia of lower extremity     Acute respiratory failure with hypoxia (HCC)     Pancreatic cyst     Recurrent major depressive disorder     Elevated CA 19-9 level     Perineal mass, female     Esophagitis candidal      Condyloma     Astrocytoma (HCC) - 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17, the patient underwent 2 surgical resections without known recurrence     AMAN (generalized anxiety disorder)     Major depressive disorder, recurrent severe without psychotic features (HCC)     Esophageal dysphagia     Chronic peripheral neuropathic pain     History of alcohol abuse     History of petit-mal seizures     Intractable episodic tension-type headache     Personal history of astrocytoma     Multilevel spine pain     Chronic pain syndrome     Marijuana abuse     Closed fracture of fifth thoracic vertebra (HCC)     Elevated alkaline phosphatase level     Chronic pancreatitis (HCC)     Erythema ab igne     Compression fracture of thoracic vertebra (HCC)     Pathological compression fracture of lumbar vertebra with delayed healing     Metabolic acidosis with increased anion gap and accumulation of organic acids     Fluid collection of pancreas     Pseudocyst of pancreas     Hyponatremia     Iron deficiency anemia     Adenomatous polyp of ascending colon     Moderate episode of recurrent major depressive disorder (HCC)     Sleep disturbance     Intractable migraine without aura and without status migrainosus

## 2025-06-23 RX ORDER — SODIUM CHLORIDE 1 G/1
TABLET ORAL
Qty: 84 TABLET | Refills: 1 | Status: SHIPPED | OUTPATIENT
Start: 2025-06-23

## 2025-06-25 NOTE — PATIENT INSTRUCTIONS
6/25/25-Faxed new order for nocturnal oxygen, hospital home oxygen evaluation, last office note to [facility] #[phone]. Rubi

## 2025-07-07 ENCOUNTER — TELEPHONE (OUTPATIENT)
Dept: PULMONOLOGY | Age: 55
End: 2025-07-07

## 2025-07-07 DIAGNOSIS — J44.1 COPD EXACERBATION (HCC): Primary | ICD-10-CM

## 2025-07-07 NOTE — TELEPHONE ENCOUNTER
Patient called and stated that being she just switched to [facility] that they never gave her a POC.  I signed the order and will fax to [facility]

## 2025-07-17 RX ORDER — FLUTICASONE PROPIONATE 50 MCG
2 SPRAY, SUSPENSION (ML) NASAL DAILY
Qty: 3 EACH | Refills: 2 | Status: SHIPPED | OUTPATIENT
Start: 2025-07-17

## 2025-07-17 NOTE — TELEPHONE ENCOUNTER
Last visit: 04/16/25  Last Med refill: 06/26/25  Does patient have enough medication for 72 hours: Yes    Next Visit Date:  Future Appointments   Date Time Provider Department Center   8/12/2025  9:15 AM Basilio Lees MD [facility]   10/16/2025 11:00 AM Ludivina Grimm MD [facility]   12/18/2025  9:20 AM Donato Dueñas MD [facility]   5/22/2026 10:30 AM [facility]       Health Maintenance   Topic Date Due    Hepatitis A vaccine (1 of 2 - Risk 2-dose series) Never done    Hepatitis B vaccine (1 of 3 - 19+ 3-dose series) Never done    COVID-19 Vaccine (5 - 2024-25 season) 09/01/2024    Flu vaccine (1) 08/01/2025    Lipids  11/01/2025    Lung Cancer Screening &/or Counseling  12/12/2025    Depression Monitoring  04/16/2026    Annual Wellness Visit (Medicare)  04/17/2026    DTaP/Tdap/Td vaccine (2 - Td or Tdap) 12/28/2030    Colorectal Cancer Screen  03/14/2034    Shingles vaccine  Completed    Pneumococcal 50+ years Vaccine  Completed    Hepatitis C screen  Completed    HIV screen  Completed    Hib vaccine  Aged Out    Polio vaccine  Aged Out    Meningococcal (ACWY) vaccine  Aged Out    Meningococcal B vaccine  Aged Out    Breast cancer screen  Discontinued    Pneumococcal 0-49 years Vaccine  Discontinued       Hemoglobin A1C (%)   Date Value   04/13/2021 5.5   07/14/2019 4.3             ( goal A1C is < 7)   No components found for: \"LABMICR\"  No components found for: \"LDLCHOLESTEROL\", \"LDLCALC\"    (goal LDL is <100)   AST (U/L)   Date Value   11/01/2024 17     ALT (U/L)   Date Value   11/01/2024 6 (L)     BUN (mg/dL)   Date Value   11/01/2024 6     BP Readings from Last 3 Encounters:   06/12/25 106/68   06/10/25 108/70   05/16/25 122/83          (goal 120/80)    All Future Testing planned in [facility]  Lab Frequency Next Occurrence   US LIVER Once 05/15/2025   US ORGAN ELASTOGRAPHY Once 05/15/2025   CBC Once 05/15/2025   Comprehensive Metabolic Panel

## 2025-07-21 RX ORDER — ROPINIROLE 1 MG/1
1 TABLET, FILM COATED ORAL NIGHTLY
Qty: 90 TABLET | Refills: 1 | Status: SHIPPED | OUTPATIENT
Start: 2025-07-21

## 2025-07-31 ENCOUNTER — TELEPHONE (OUTPATIENT)
Dept: FAMILY MEDICINE CLINIC | Age: 55
End: 2025-07-31

## (undated) DEVICE — NEEDLE SPINAL 22GA L3.5IN SPINOCAN

## (undated) DEVICE — GAUZE,SPONGE,FLUFF,6"X6.75",STRL,5/TRAY: Brand: MEDLINE

## (undated) DEVICE — GLOVE ORANGE PI 7   MSG9070

## (undated) DEVICE — INSERT CUSHION HEAD PRONEVIEW

## (undated) DEVICE — PROTECTOR EYE PT SELF ADH NS OPT GRD LF

## (undated) DEVICE — NEEDLE HYPO 25GA L1.5IN BLU POLYPR HUB S STL REG BVL STR

## (undated) DEVICE — BITEBLOCK 54FR W/ DENT RIM BLOX

## (undated) DEVICE — DEFENDO AIR WATER SUCTION AND BIOPSY VALVE KIT FOR  OLYMPUS: Brand: DEFENDO AIR/WATER/SUCTION AND BIOPSY VALVE

## (undated) DEVICE — CONTAINER,SPECIMEN,4OZ,OR STRL: Brand: MEDLINE

## (undated) DEVICE — TRAP SPEC RETRV CLR PLAS POLYP IN LN SUCT QUIK CTCH

## (undated) DEVICE — Device

## (undated) DEVICE — MITT SURG PREP L ADH DISPOSABLE

## (undated) DEVICE — SINGLE-USE POLYPECTOMY SNARE: Brand: CAPTIFLEX

## (undated) DEVICE — MINOR BSIN PK

## (undated) DEVICE — SOLUTION PREP POVIDONE IOD FOR SKIN MUCOUS MEM PRIOR TO

## (undated) DEVICE — JCKSON TBL POSTNER NO HD REST: Brand: MEDLINE INDUSTRIES, INC.

## (undated) DEVICE — SNARE ENDOSCP L240CM W15MM SHTH DIA2.4MM CHN 2.8MM STIFF

## (undated) DEVICE — PROTECTOR ULN NRV PUR FOAM HK LOOP STRP ANATOMICALLY

## (undated) DEVICE — PREP-RESISTANT MARKER W/ RULER: Brand: MEDLINE INDUSTRIES, INC.

## (undated) DEVICE — GOWN,AURORA,NONREINFORCED,LARGE: Brand: MEDLINE

## (undated) DEVICE — GOWN,SIRUS,NONRNF,SETINSLV,XL,20/CS: Brand: MEDLINE

## (undated) DEVICE — LEGGINGS, PAIR, 31X48, STERILE: Brand: MEDLINE

## (undated) DEVICE — INTENDED FOR TISSUE SEPARATION, AND OTHER PROCEDURES THAT REQUIRE A SHARP SURGICAL BLADE TO PUNCTURE OR CUT.: Brand: BARD-PARKER ® CARBON RIB-BACK BLADES

## (undated) DEVICE — GLOVE ORTHO 8   MSG9480

## (undated) DEVICE — CONTAINER,SPEC,PNEUM TUBE,3OZ,STRL PATH: Brand: MEDLINE

## (undated) DEVICE — Z DISCONTINUED BY MEDLINE USE 2711682 TRAY SKIN PREP DRY W/ PREM GLV

## (undated) DEVICE — ELECTRODE PT RET AD L9FT HI MOIST COND ADH HYDRGEL CORDED

## (undated) DEVICE — GLOVE SURG SZ 65 THK91MIL LTX FREE SYN POLYISOPRENE

## (undated) DEVICE — YANKAUER,FLEXIBLE HANDLE,REGLR CAPACITY: Brand: MEDLINE INDUSTRIES, INC.

## (undated) DEVICE — TOWEL,OR,DSP,ST,BLUE,DLX,XR,4/PK,20PK/CS: Brand: MEDLINE

## (undated) DEVICE — Device: Brand: DEFENDO VALVE AND CONNECTOR KIT

## (undated) DEVICE — APPLICATOR MEDICATED 26 CC SOLUTION HI LT ORNG CHLORAPREP

## (undated) DEVICE — PACK PROCEDURE SURG GEN MIN SVMMC

## (undated) DEVICE — FORCEPS BX L240CM WRK CHN 2.8MM STD CAP W/ NDL MIC MESH

## (undated) DEVICE — GAUZE,SPONGE,4"X4",16PLY,XRAY,STRL,LF: Brand: MEDLINE

## (undated) DEVICE — SYRINGE 20ML LL S/C 50

## (undated) DEVICE — DRAPE,UNDRBUT,WHT GRAD PCH,CAPPORT,20/CS: Brand: MEDLINE

## (undated) DEVICE — SAVARY GILLIARD WIRE GUIDE: Brand: SAVARY GILLIARD

## (undated) DEVICE — DRESSING PETRO W3XL8IN OIL EMUL N ADH GZ KNIT IMPREG CELOS

## (undated) DEVICE — DRESSING TRNSPAR W5XL4.5IN FLM SHT SEMIPERMEABLE WIND

## (undated) DEVICE — RENTAL EVACUATOR SMOKE

## (undated) DEVICE — SUBMUCOSAL LIFTING AGENT WITH NEEDLE.: Brand: ORISE™ GEL

## (undated) DEVICE — SMOKE EVACUATION TUBING WITH 8 IN INTEGRAL WAND AND SPONGE GUARD: Brand: BUFFALO FILTER

## (undated) DEVICE — MARKER,SKIN,WI/RULER AND LABELS: Brand: MEDLINE

## (undated) DEVICE — SOLUTION SCRB 4OZ 10% POVIDONE IOD ANTIMIC BTL

## (undated) DEVICE — HIGH PERFORMANCE GUIDEWIRE: Brand: DREAMWIRE

## (undated) DEVICE — ENDO KIT W/SYRINGE: Brand: MEDLINE INDUSTRIES, INC.

## (undated) DEVICE — SNARE ENDOSCP AD L240CM LOOP W10MM SHTH DIA2.4MM RND INSUL

## (undated) DEVICE — BONE BIOPSY DEVICE F05A BBD SIZE 3: Brand: MEDTRONIC REUSABLE INSTRUMENTS

## (undated) DEVICE — TOWEL,OR,DSP,ST,NATURAL,DLX,4/PK,20PK/CS: Brand: MEDLINE

## (undated) DEVICE — SYRINGE MED 10ML TRNSLUC BRL PLUNG BLK MRK POLYPR CTRL

## (undated) DEVICE — POLYP TRAP: Brand: TRAPEASE®

## (undated) DEVICE — 1010 S-DRAPE TOWEL DRAPE 10/BX: Brand: STERI-DRAPE™

## (undated) DEVICE — BONE TAMP KIT KPX153PB FF X2 15/3 1 STP: Brand: KYPHOPAK™ TRAY

## (undated) DEVICE — INTRODUCER T15S KYPHON DIRECT SIZE 3: Brand: KYPHON ASSIST™

## (undated) DEVICE — PAD N ADH W3XL4IN POLY COT SFT PERF FLM EASILY CUT ABSRB

## (undated) DEVICE — ENDOSCOPIC ULTRASOUND FINE NEEDLE BIOPSY (FNB) DEVICE: Brand: ACQUIRE

## (undated) DEVICE — GARMENT,MEDLINE,DVT,INT,CALF,MED, GEN2: Brand: MEDLINE

## (undated) DEVICE — SUTURE ETHLN SZ 3-0 L18IN NONABSORBABLE BLK L24MM PS-1 3/8 1663G

## (undated) DEVICE — COUNTER NDL 10 COUNT HLD 20 FOAM BLK SGL MAG

## (undated) DEVICE — RX PUSHER: Brand: NAVIFLEX™ RX PUSHER

## (undated) DEVICE — DRAPE IRRIG FLD WRM W44XL44IN W/ AORN STD PRTBL INTRATEMP

## (undated) DEVICE — 3M™ STERI-STRIP™ COMPOUND BENZOIN TINCTURE 40 BAGS/CARTON 4 CARTONS/CASE C1544: Brand: 3M™ STERI-STRIP™

## (undated) DEVICE — GLOVE SURG SZ 6 THK91MIL LTX FREE SYN POLYISOPRENE ANTI

## (undated) DEVICE — SPHINCTEROTOME: Brand: DREAMTOME™ RX 44

## (undated) DEVICE — GLOVE SURG SZ 8 THK118MIL BLK LTX FREE POLYISOPRENE BEAD CUF

## (undated) DEVICE — SUTURE CHROMIC GUT SZ 2-0 L27IN ABSRB BRN L26MM SH 1/2 CIR G123H

## (undated) DEVICE — C-ARM: Brand: UNBRANDED

## (undated) DEVICE — CONTAINER,SPECIMEN,OR STERILE,4OZ: Brand: MEDLINE

## (undated) DEVICE — ACCESS AND DELIVERY CATHETER: Brand: SPYSCOPE™ DS II

## (undated) DEVICE — RENTAL LASER CO2 CASE

## (undated) DEVICE — SHEET, T, LAPAROTOMY, STERILE: Brand: MEDLINE